# Patient Record
Sex: MALE | Race: WHITE | Employment: FULL TIME | URBAN - METROPOLITAN AREA
[De-identification: names, ages, dates, MRNs, and addresses within clinical notes are randomized per-mention and may not be internally consistent; named-entity substitution may affect disease eponyms.]

---

## 2017-05-26 PROCEDURE — 87205 SMEAR GRAM STAIN: CPT

## 2017-05-26 PROCEDURE — 87070 CULTURE OTHR SPECIMN AEROBIC: CPT

## 2017-05-27 ENCOUNTER — HOSPITAL ENCOUNTER (OUTPATIENT)
Age: 57
Setting detail: SPECIMEN
Discharge: HOME | End: 2017-05-27
Payer: COMMERCIAL

## 2017-05-27 LAB
DIRECT EXAM: NORMAL
DIRECT EXAM: NORMAL
Lab: NORMAL
SPECIMEN DESCRIPTION: NORMAL
SPECIMEN DESCRIPTION: NORMAL
STATUS: NORMAL

## 2017-05-27 PROCEDURE — 87070 CULTURE OTHR SPECIMN AEROBIC: CPT

## 2017-05-27 PROCEDURE — 87205 SMEAR GRAM STAIN: CPT

## 2017-05-29 LAB
CULTURE: ABNORMAL
CULTURE: ABNORMAL
DIRECT EXAM: ABNORMAL
DIRECT EXAM: ABNORMAL
Lab: ABNORMAL
SPECIMEN DESCRIPTION: ABNORMAL
STATUS: ABNORMAL

## 2017-10-03 ENCOUNTER — HOSPITAL ENCOUNTER (INPATIENT)
Age: 57
LOS: 3 days | Discharge: HOME | DRG: 603 | End: 2017-10-06
Attending: EMERGENCY MEDICINE | Admitting: INTERNAL MEDICINE
Payer: COMMERCIAL

## 2017-10-03 DIAGNOSIS — L03.115 CELLULITIS OF RIGHT LOWER EXTREMITY: Primary | ICD-10-CM

## 2017-10-03 LAB
ABSOLUTE EOS #: 0.1 K/UL (ref 0–0.4)
ABSOLUTE LYMPH #: 1.4 K/UL (ref 1–4.8)
ABSOLUTE MONO #: 1.4 K/UL (ref 0.1–1.2)
ANION GAP SERPL CALCULATED.3IONS-SCNC: 16 MMOL/L (ref 9–17)
BASOPHILS # BLD: 0 %
BASOPHILS ABSOLUTE: 0 K/UL (ref 0–0.2)
BUN BLDV-MCNC: 38 MG/DL (ref 6–20)
BUN/CREAT BLD: ABNORMAL (ref 9–20)
CALCIUM SERPL-MCNC: 9.4 MG/DL (ref 8.6–10.4)
CHLORIDE BLD-SCNC: 96 MMOL/L (ref 98–107)
CO2: 21 MMOL/L (ref 20–31)
CREAT SERPL-MCNC: 2.06 MG/DL (ref 0.7–1.2)
DIFFERENTIAL TYPE: ABNORMAL
EOSINOPHILS RELATIVE PERCENT: 1 %
GFR AFRICAN AMERICAN: 40 ML/MIN
GFR NON-AFRICAN AMERICAN: 33 ML/MIN
GFR SERPL CREATININE-BSD FRML MDRD: ABNORMAL ML/MIN/{1.73_M2}
GFR SERPL CREATININE-BSD FRML MDRD: ABNORMAL ML/MIN/{1.73_M2}
GLUCOSE BLD-MCNC: 194 MG/DL (ref 70–99)
HCT VFR BLD CALC: 41.4 % (ref 41–53)
HEMOGLOBIN: 13.8 G/DL (ref 13.5–17.5)
LACTIC ACID, WHOLE BLOOD: 1.7 MMOL/L (ref 0.7–2.1)
LACTIC ACID: NORMAL MMOL/L
LYMPHOCYTES # BLD: 9 %
MCH RBC QN AUTO: 29.4 PG (ref 26–34)
MCHC RBC AUTO-ENTMCNC: 33.2 G/DL (ref 31–37)
MCV RBC AUTO: 88.6 FL (ref 80–100)
MONOCYTES # BLD: 9 %
PDW BLD-RTO: 14.1 % (ref 12.5–15.4)
PLATELET # BLD: 198 K/UL (ref 140–450)
PLATELET ESTIMATE: ABNORMAL
PMV BLD AUTO: 8.9 FL (ref 6–12)
POTASSIUM SERPL-SCNC: 4.6 MMOL/L (ref 3.7–5.3)
RBC # BLD: 4.68 M/UL (ref 4.5–5.9)
RBC # BLD: ABNORMAL 10*6/UL
SEG NEUTROPHILS: 81 %
SEGMENTED NEUTROPHILS ABSOLUTE COUNT: 12 K/UL (ref 1.8–7.7)
SODIUM BLD-SCNC: 133 MMOL/L (ref 135–144)
WBC # BLD: 15 K/UL (ref 3.5–11)
WBC # BLD: ABNORMAL 10*3/UL

## 2017-10-03 PROCEDURE — 6360000002 HC RX W HCPCS: Performed by: EMERGENCY MEDICINE

## 2017-10-03 PROCEDURE — 96375 TX/PRO/DX INJ NEW DRUG ADDON: CPT

## 2017-10-03 PROCEDURE — 2580000003 HC RX 258: Performed by: PHYSICIAN ASSISTANT

## 2017-10-03 PROCEDURE — 85025 COMPLETE CBC W/AUTO DIFF WBC: CPT

## 2017-10-03 PROCEDURE — 6370000000 HC RX 637 (ALT 250 FOR IP): Performed by: NURSE PRACTITIONER

## 2017-10-03 PROCEDURE — 99284 EMERGENCY DEPT VISIT MOD MDM: CPT

## 2017-10-03 PROCEDURE — 1200000000 HC SEMI PRIVATE

## 2017-10-03 PROCEDURE — 96374 THER/PROPH/DIAG INJ IV PUSH: CPT

## 2017-10-03 PROCEDURE — 96376 TX/PRO/DX INJ SAME DRUG ADON: CPT

## 2017-10-03 PROCEDURE — 80048 BASIC METABOLIC PNL TOTAL CA: CPT

## 2017-10-03 PROCEDURE — 6360000002 HC RX W HCPCS: Performed by: PHYSICIAN ASSISTANT

## 2017-10-03 PROCEDURE — G0378 HOSPITAL OBSERVATION PER HR: HCPCS

## 2017-10-03 PROCEDURE — 93970 EXTREMITY STUDY: CPT

## 2017-10-03 PROCEDURE — 83605 ASSAY OF LACTIC ACID: CPT

## 2017-10-03 RX ORDER — FENTANYL CITRATE 50 UG/ML
50 INJECTION, SOLUTION INTRAMUSCULAR; INTRAVENOUS ONCE
Status: COMPLETED | OUTPATIENT
Start: 2017-10-03 | End: 2017-10-03

## 2017-10-03 RX ORDER — SODIUM CHLORIDE 0.9 % (FLUSH) 0.9 %
10 SYRINGE (ML) INJECTION PRN
Status: DISCONTINUED | OUTPATIENT
Start: 2017-10-03 | End: 2017-10-06 | Stop reason: HOSPADM

## 2017-10-03 RX ORDER — ONDANSETRON 2 MG/ML
4 INJECTION INTRAMUSCULAR; INTRAVENOUS EVERY 8 HOURS PRN
Status: DISCONTINUED | OUTPATIENT
Start: 2017-10-03 | End: 2017-10-06 | Stop reason: HOSPADM

## 2017-10-03 RX ORDER — FENTANYL CITRATE 50 UG/ML
50 INJECTION, SOLUTION INTRAMUSCULAR; INTRAVENOUS
Status: DISCONTINUED | OUTPATIENT
Start: 2017-10-03 | End: 2017-10-06 | Stop reason: HOSPADM

## 2017-10-03 RX ORDER — METOPROLOL TARTRATE 50 MG/1
50 TABLET, FILM COATED ORAL 2 TIMES DAILY
Status: DISCONTINUED | OUTPATIENT
Start: 2017-10-03 | End: 2017-10-06 | Stop reason: HOSPADM

## 2017-10-03 RX ORDER — ACETAMINOPHEN 325 MG/1
650 TABLET ORAL EVERY 4 HOURS PRN
Status: DISCONTINUED | OUTPATIENT
Start: 2017-10-03 | End: 2017-10-04

## 2017-10-03 RX ORDER — SODIUM CHLORIDE 0.9 % (FLUSH) 0.9 %
10 SYRINGE (ML) INJECTION EVERY 12 HOURS SCHEDULED
Status: DISCONTINUED | OUTPATIENT
Start: 2017-10-03 | End: 2017-10-06 | Stop reason: HOSPADM

## 2017-10-03 RX ORDER — ZOLPIDEM TARTRATE 5 MG/1
10 TABLET ORAL NIGHTLY PRN
Status: DISCONTINUED | OUTPATIENT
Start: 2017-10-03 | End: 2017-10-06 | Stop reason: HOSPADM

## 2017-10-03 RX ORDER — 0.9 % SODIUM CHLORIDE 0.9 %
1000 INTRAVENOUS SOLUTION INTRAVENOUS ONCE
Status: COMPLETED | OUTPATIENT
Start: 2017-10-03 | End: 2017-10-03

## 2017-10-03 RX ADMIN — METOPROLOL TARTRATE 50 MG: 50 TABLET, FILM COATED ORAL at 23:50

## 2017-10-03 RX ADMIN — FENTANYL CITRATE 50 MCG: 50 INJECTION INTRAMUSCULAR; INTRAVENOUS at 17:56

## 2017-10-03 RX ADMIN — VANCOMYCIN HYDROCHLORIDE 2000 MG: 1 INJECTION, POWDER, LYOPHILIZED, FOR SOLUTION INTRAVENOUS at 21:20

## 2017-10-03 RX ADMIN — FENTANYL CITRATE 50 MCG: 50 INJECTION INTRAMUSCULAR; INTRAVENOUS at 21:19

## 2017-10-03 RX ADMIN — ZOLPIDEM TARTRATE 10 MG: 5 TABLET ORAL at 23:50

## 2017-10-03 RX ADMIN — SODIUM CHLORIDE 1000 ML: 9 INJECTION, SOLUTION INTRAVENOUS at 19:20

## 2017-10-03 ASSESSMENT — PAIN DESCRIPTION - ORIENTATION
ORIENTATION: RIGHT;LOWER
ORIENTATION: RIGHT;LOWER

## 2017-10-03 ASSESSMENT — ENCOUNTER SYMPTOMS
COUGH: 0
COLOR CHANGE: 0
VOMITING: 0
NAUSEA: 0
DIARRHEA: 0
BACK PAIN: 0
SHORTNESS OF BREATH: 0
ABDOMINAL PAIN: 0

## 2017-10-03 ASSESSMENT — PAIN SCALES - GENERAL
PAINLEVEL_OUTOF10: 8
PAINLEVEL_OUTOF10: 8
PAINLEVEL_OUTOF10: 7

## 2017-10-03 ASSESSMENT — PAIN DESCRIPTION - PAIN TYPE
TYPE: ACUTE PAIN
TYPE: ACUTE PAIN

## 2017-10-03 ASSESSMENT — PAIN DESCRIPTION - PROGRESSION: CLINICAL_PROGRESSION: GRADUALLY WORSENING

## 2017-10-03 ASSESSMENT — PAIN DESCRIPTION - DESCRIPTORS
DESCRIPTORS: ACHING
DESCRIPTORS: ACHING

## 2017-10-03 ASSESSMENT — PAIN DESCRIPTION - LOCATION
LOCATION: LEG
LOCATION: LEG

## 2017-10-03 ASSESSMENT — PAIN DESCRIPTION - ONSET: ONSET: PROGRESSIVE

## 2017-10-03 NOTE — IP AVS SNAPSHOT
After Visit Summary  (Discharge Instructions)    Medication List for Home    Based on the information you provided to us as well as any changes during this visit, the following is your updated medication list.  Compare this with your prescription bottles at home. If you have any questions or concerns, contact your primary care physician's office. Daily Medication List (This medication list can be shared with any healthcare provider who is helping you manage your medications)      There are NEW medications for you. START taking them after you leave the hospital        Last Dose    Next Dose Due AM NOON PM NIGHT    cephALEXin 500 MG capsule   Commonly known as:  KEFLEX   Take 1 capsule by mouth 4 times daily for 7 days                500 mg on 10/6/2017 12:20 PM     tonight                                terbinafine 1 % cream   Commonly known as:  ATHLETES FOOT   Apply topically 2 times daily.  Ankles to toes, thin layer and let dry, both feet                  tonight                               These are medications you told us you were taking at home, CONTINUE taking them after you leave the hospital        Last Dose    Next Dose Due AM NOON PM NIGHT    ALLEGRA ALLERGY 180 MG tablet   Generic drug:  fexofenadine   Take 180 mg by mouth daily as needed                                         allopurinol 100 MG tablet   Commonly known as:  ZYLOPRIM   Take 100 mg by mouth daily                100 mg on 10/6/2017  8:44 AM     tomorrow                          aspirin 325 MG EC tablet   Take 325 mg by mouth daily                325 mg on 10/6/2017  8:44 AM     tomorrow                          cyclobenzaprine 5 MG tablet   Commonly known as:  FLEXERIL   Take 10 mg by mouth 2 times daily as needed for Muscle spasms                                               furosemide 40 MG tablet   Commonly known as:  LASIX   Take 40 mg by mouth daily                40 mg on 10/5/2017  9:10 AM     tomorrow hydrALAZINE 25 MG tablet   Commonly known as:  APRESOLINE   Take 25 mg by mouth 2 times daily                25 mg on 10/6/2017  8:44 AM     tonight                             ibuprofen 800 MG tablet   Commonly known as:  ADVIL;MOTRIN   Take 800 mg by mouth every 6 hours as needed for Pain                                         lisinopril 20 MG tablet   Commonly known as:  PRINIVIL;ZESTRIL   Take 20 mg by mouth daily                20 mg on 10/5/2017  9:11 AM     tomorrow                          metoprolol tartrate 50 MG tablet   Commonly known as:  LOPRESSOR   Take 50 mg by mouth 2 times daily                50 mg on 10/6/2017  8:44 AM     tonight                             pravastatin 20 MG tablet   Commonly known as:  PRAVACHOL   Take 20 mg by mouth daily                20 mg on 10/5/2017  5:33 PM     tomorrow                          SITagliptin 50 MG tablet   Commonly known as:  JANUVIA   Take 50 mg by mouth daily                  tomorrow                          traMADol 50 MG tablet   Commonly known as:  ULTRAM   Take 100 mg by mouth 4 times daily as needed for Pain Indications: Takes 2 tabs (=100mg) QID prn                100 mg on 10/6/2017 12:20 PM     620 pm                       zolpidem 10 MG tablet   Commonly known as:  AMBIEN   Take by mouth nightly as needed for Sleep                10 mg on 10/6/2017 12:04 AM                              These are the medications you have told us you were taking at home, STOP taking them after you leave the hospital     metFORMIN 500 MG tablet   Commonly known as:  GLUCOPHAGE            Where to Get Your Medications      You can get these medications from any pharmacy     Bring a paper prescription for each of these medications     cephALEXin 500 MG capsule    terbinafine 1 % cream               Allergies as of 10/6/2017        Reactions    Ampicillin Swelling    Swelling of throat. Pcn [Penicillins] Swelling    Throat swelling. Immunizations as of 10/6/2017     Name Date Dose VIS Date Route    Pneumococcal Polysaccharide (Ecupkddbo92) 2016 0.5 mL 2015 Intramuscular      Last Vitals          Most Recent Value    Temp  97.6 °F (36.4 °C)    Pulse  78    Resp  16    BP  110/76         After Visit Summary    This summary was created for you. Thank you for entrusting your care to us. The following information includes details about your hospital/visit stay along with steps you should take to help with your recovery once you leave the hospital.  In this packet, you will find information about the topics listed below:    · Instructions about your medications including a list of your home medications  · A summary of your hospital visit  · Follow-up appointments once you have left the hospital  · Your care plan at home      You may receive a survey regarding the care you received during your stay. Your input is valuable to us. We encourage you to complete and return your survey in the envelope provided. We hope you will choose us in the future for your healthcare needs. Patient Information     Patient Name BRAD Mckeon 1960      Care Provided at:     Name Address Phone       [address] [address] [phone]            Your Visit    Here you will find information about your visit, including the reason for your visit. Please take this sheet with you when you visit your doctor or other health care provider in the future. It will help determine the best possible medical care for you at that time. If you have any questions once you leave the hospital, please call the department phone number listed below.         Why you were here     Your primary diagnosis was:  Not on File    Your diagnoses also included:  Cellulitis, Type 2 Diabetes Mellitus Without Complication (Hcc), Essential Hypertension, Chronic Kidney Disease (Ckd), Laz (Acute Kidney Injury) ([facility]) Visit Information     Date & Time Provider Department Dept. Phone    10/3/2017 Christoper Scheuermann, MD [facility] Ortho/Med Surg [phone]       Follow-up Appointments    Below is a list of your follow-up and future appointments. This may not be a complete list as you may have made appointments directly with providers that we are not aware of or your providers may have made some for you. Please call your providers to confirm appointments. It is important to keep your appointments. Please bring your current insurance card, photo ID, co-pay, and all medication bottles to your appointment. If self-pay, payment is expected at the time of service. Follow-up Information     Follow up with Viki Simon MD In 1 week. Specialty:  Family Medicine    Contact information:    [address] [facility]        Preventive Care        Date Due    Hepatitis C screening is recommended for all adults regardless of risk factors born between [facility] at least once (lifetime) who have never been tested. 1960    Diabetic Foot Exam 1/13/1968    Eye Exam By An Eye Doctor 1/13/1968    Cholesterol Screening 1/13/1968    HIV screening is recommended for all people regardless of risk factors  aged 15-65 years at least once (lifetime) who have never been HIV tested. 1/13/1973    Tetanus Combination Vaccine (1 - Tdap) 1/13/1977    Colonoscopy 1/13/2008    Yearly Flu Vaccine (1) 9/1/2017    Urine Check For Kidney Problems 12/11/2017    Hemoglobin A1C (Test For Long-Term Glucose Control) 1/5/2018                 Care Plan Once You Return Home    This section includes instructions you will need to follow once you leave the hospital.  Your care team will discuss these with you, so you and those caring for you know how to best care for your health needs at home. This section may also include educational information about certain health topics that may be of help to you. results, renew your prescriptions, schedule appointments, view visit notes, and more. How Do I Sign Up? 1. In your Internet browser, go to [email or web address]  2. Click on the Sign Up Now link in the Sign In box. You will see the New Member Sign Up page. 3. Enter your [employer] Access Code exactly as it appears below. You will not need to use this code after youve completed the sign-up process. If you do not sign up before the expiration date, you must request a new code. [employer] Access Code: [email or web address]  Expires: 12/3/2017  9:09 AM    4. Enter your Social Security Number ([id number]) and Date of Birth (mm/dd/yyyy) as indicated and click Submit. You will be taken to the next sign-up page. 5. Create a [employer] ID. This will be your [employer] login ID and cannot be changed, so think of one that is secure and easy to remember. 6. Create a [employer] password. You can change your password at any time. 7. Enter your Password Reset Question and Answer. This can be used at a later time if you forget your password. 8. Enter your e-mail address. You will receive e-mail notification when new information is available in [address] 9. Click Sign Up. You can now view your medical record. Additional Information  If you have questions, please contact the physician practice where you receive care. Remember, [employer] is NOT to be used for urgent needs. For medical emergencies, dial 911. For questions regarding your [employer] account call [phone]. If you have a clinical question, please call your doctor's office. View your information online  ? Review your current list of  medications, immunization, and allergies. ? Review your future test results online . ?  Review your discharge instructions provided by your caregivers at discharge    Certain functionality such as prescription refills, scheduling appointments or sending messages to your provider are not activated if your doctor for medical advice about side effects. You may report side effects to FDA at 1-451-FDA-5090. What other drugs will affect cephalexin? Other drugs may interact with cephalexin, including prescription and over-the-counter medicines, vitamins, and herbal products. Tell each of your health care providers about all medicines you use now and any medicine you start or stop using. Where can I get more information? Your pharmacist can provide more information about cephalexin. Remember, keep this and all other medicines out of the reach of children, never share your medicines with others, and use this medication only for the indication prescribed. Every effort has been made to ensure that the information provided by [address] [place], [place] [address] is accurate, up-to-date, and complete, but no guarantee is made to that effect. Drug information contained herein may be time sensitive. [employer] information has been compiled for use by healthcare practitioners and consumers in the [place] and therefore [employer] does not warrant that uses outside of the [place] are appropriate, unless specifically indicated otherwise. [facility]'s drug information does not endorse drugs, diagnose patients or recommend therapy. [employer] drug information is an informational resource designed to assist licensed healthcare practitioners in caring for their patients and/or to serve consumers viewing this service as a supplement to, and not a substitute for, the expertise, skill, knowledge and judgment of healthcare practitioners. The absence of a warning for a given drug or drug combination in no way should be construed to indicate that the drug or drug combination is safe, effective or appropriate for any given patient. [facility] does not assume any responsibility for any aspect of healthcare administered with the aid of information [employer] [facility] provides.  The information contained herein is not intended to cover all possible uses, directions, precautions, warnings, drug interactions, allergic reactions, or adverse effects. If you have questions about the drugs you are taking, check with your doctor, nurse or pharmacist.  Copyright 0413-1638 [address] Version: 8.01. Revision date: 2/4/2016. Care instructions adapted under license by [facility] ([facility]). If you have questions about a medical condition or this instruction, always ask your healthcare professional. [place] [address] any warranty or liability for your use of this information. terbinafine topical  Pronunciation:  ter BIN a feen TOP i leti  Brand:  Athlete's Foot Cream, LamISIL AT  What is the most important information I should know about terbinafine topical?  Follow all directions on your medicine label and package. Tell each of your healthcare providers about all your medical conditions, allergies, and all medicines you use. What is terbinafine topical?  Terbinafine is an antifungal medication that fights infections caused by fungus. Terbinafine topical (for the skin) is used to treat skin infections such as athlete's foot, jock itch, and ringworm infections. Terbinafine topical may also be used for purposes not listed in this medication guide. What should I discuss with my healthcare provider before using terbinafine topical?  You should not use terbinafine topical if you are allergic to it. This medicine is not expected to harm an unborn baby. Tell your doctor if you are pregnant or plan to become pregnant. It is not known whether terbinafine passes into breast milk or if it could harm a nursing baby. Tell your doctor if you are breast-feeding a baby. Terbinafine is not approved for use by anyone younger than 15years old. How should I use terbinafine topical?  Use exactly as directed on the label, or as prescribed by your doctor. Do not use in larger or smaller amounts or for longer than recommended. Do not take by mouth. Terbinafine topical is for use only on the skin. Do not use this medicine to treat fungal infections of your fingernails, toenails, or scalp. Read all patient information, medication guides, and instruction sheets provided to you. Ask your doctor or pharmacist if you have any questions. Wash your hands before and after using this medicine. Clean and dry the affected area. Apply the medicine as directed. Use this medicine for the full prescribed length of time. Your symptoms may improve before the infection is completely cleared. Call your doctor if your symptoms do not improve, or if they get worse. Do not cover the treated skin area unless your doctor tells you to. Store at room temperature away from moisture and heat. Keep the medicine container tightly closed when not in use. What happens if I miss a dose? Apply the missed dose as soon as you remember. Skip the missed dose if it is almost time for your next dose. Do not use extra medicine to make up the missed dose. What happens if I overdose? An overdose of terbinafine is not expected to be dangerous. Seek emergency medical attention or call the Poison Help line at 1-897.560.8770 if anyone has accidentally swallowed the medication. What should I avoid while using terbinafine topical?  If this medicine gets in your eyes, nose, mouth, rectum, or vagina, rinse with water. Avoid using other medications on the areas you treat with terbinafine unless your doctor tells you to. Avoid wearing tight-fitting, synthetic clothing that doesn't allow air circulation. Wear loose-fitting clothing made of cotton and other natural fibers until your infection is healed. What are the possible side effects of terbinafine topical?  Get emergency medical help if you have signs of an allergic reaction:  hives; difficult breathing; swelling of your face, lips, tongue, or throat.   Stop using terbinafine and call your doctor at once if you have: drug combination in no way should be construed to indicate that the drug or drug combination is safe, effective or appropriate for any given patient. [facility] does not assume any responsibility for any aspect of healthcare administered with the aid of information [facility] provides. The information contained herein is not intended to cover all possible uses, directions, precautions, warnings, drug interactions, allergic reactions, or adverse effects. If you have questions about the drugs you are taking, check with your doctor, nurse or pharmacist.  Copyright 4049-3220 [address]: 7.01. Revision date: 2/20/2015. Care instructions adapted under license by [facility] ([facility]). If you have questions about a medical condition or this instruction, always ask your healthcare professional. [place] [address] any warranty or liability for your use of this information.

## 2017-10-03 NOTE — PROGRESS NOTES
Pharmacy Note  Vancomycin Consult    Guerrero Cortez is a 59 y.o. male started on Vancomycin for cellulitis; consult received from [facility], [place] to manage therapy. Patient Active Problem List   Diagnosis    Cellulitis    Type 2 diabetes mellitus without complication (HCC)       Allergies:  Ampicillin and Pcn [penicillins]     Temp max: 99.1    Recent Labs      10/03/17   1759   BUN  38*       Recent Labs      10/03/17   1759   CREATININE  2.06*       Recent Labs      10/03/17   1759   WBC  15.0*       No intake or output data in the 24 hours ending 10/03/17 1937    Culture Date      Source                       Results  None    Ht Readings from Last 1 Encounters:   10/03/17 5' 10.87\" (1.8 m)        Wt Readings from Last 1 Encounters:   10/03/17 292 lb (132.5 kg)         Body mass index is 40.88 kg/(m^2). Estimated Creatinine Clearance: 54 mL/min (based on Cr of 2.06). Assessment/Plan:  Will initiate vancomycin 2000 mg IV once. Will obtain random vancomycin level with AM labs tomorrow. Further dosing to be determined based on level, renal function, and clinical response. Thank you for the consult. Will continue to follow.   Maria M Valenzuela, PharmD, BCPS  10/3/2017  7:42 PM

## 2017-10-03 NOTE — IP AVS SNAPSHOT
Patient Information     Patient Name BRAD Villeda 1960         This is your updated medication list to keep with you all times      TAKE these medications     ALLEGRA ALLERGY 180 MG tablet   Generic drug:  fexofenadine       allopurinol 100 MG tablet   Commonly known as:  ZYLOPRIM       aspirin 325 MG EC tablet       cephALEXin 500 MG capsule   Commonly known as:  KEFLEX   Take 1 capsule by mouth 4 times daily for 7 days       cyclobenzaprine 5 MG tablet   Commonly known as:  FLEXERIL       furosemide 40 MG tablet   Commonly known as:  LASIX       hydrALAZINE 25 MG tablet   Commonly known as:  APRESOLINE       ibuprofen 800 MG tablet   Commonly known as:  ADVIL;MOTRIN       lisinopril 20 MG tablet   Commonly known as:  PRINIVIL;ZESTRIL       metoprolol tartrate 50 MG tablet   Commonly known as:  LOPRESSOR       pravastatin 20 MG tablet   Commonly known as:  PRAVACHOL       SITagliptin 50 MG tablet   Commonly known as:  JANUVIA       terbinafine 1 % cream   Commonly known as:  ATHLETES FOOT   Apply topically 2 times daily.  Ankles to toes, thin layer and let dry, both feet       traMADol 50 MG tablet   Commonly known as:  ULTRAM       zolpidem 10 MG tablet   Commonly known as:  AMBIEN

## 2017-10-03 NOTE — ED PROVIDER NOTES
[facility]  ED  eMERGENCY dEPARTMENT eNCOUnter      Pt Name: Girma Duron  MRN: [id number]  [place] 1960  Date of evaluation: 10/3/2017  Provider: Tavo Farley Dr       Chief Complaint   Patient presents with    Leg Pain     pt with cellulitis to lower right extremity. redness noted from below the knee to his foot. c/o pain. ongoing this week. worsening. pt is a diabetic. HISTORY OF PRESENT ILLNESS  (Location/Symptom, Timing/Onset, Context/Setting, Quality, Duration, Modifying Factors, Severity.)   Girma Duron is a 59 y.o. male who presents to the emergency department Complaining of right lower leg swelling and pain and redness. Patient states he has had cellulitis in this extremity before and states that it feels like that. Patient's right lower leg is swollen and his foot is red and swollen as well. Patient has no history of blood clotting and states that he has 0 calf pain. Patient states he has had this before and was admitted to the hospital for intravenous antibiotics. Patient denies chest pain, shortness of breath, abdominal pain, headache, fever, chills, nausea, vomiting, diarrhea, numbness and tingling anywhere in his body. Patient is diabetic. Patient states this started Sunday morning. He also states he is taking oral antibiotics for his nose cut her heel. Patient is resting comfortably on the gurney and appears in no acute distress. Patient appears nontoxic with no meningeal signs. Patient is neurovascular intact. Patient's light sensation is intact. Cap refills less than. Proprioception is within normal limits. Distal pulses and deep tendon reflexes are within normal limits. Motor functions 5 out of 5 bilaterally in the lower extremity. All compartments are soft and compressible. absolutely no other symptoms at this time.           REVIEW OF SYSTEMS    (2-9 systems for level 4, 10 or more for level 5)     Review of Systems Constitutional: Negative for chills, fatigue and fever. Respiratory: Negative for cough and shortness of breath. Cardiovascular: Negative for chest pain, palpitations and leg swelling. Gastrointestinal: Negative for abdominal pain, diarrhea, nausea and vomiting. Musculoskeletal: Negative for back pain, neck pain and neck stiffness. Skin: Negative for color change. Right lower extremity cellulitis. Neurological: Negative for dizziness, weakness, numbness and headaches.          PAST MEDICAL HISTORY         Diagnosis Date    Back pain     Cellulitis     Diabetes mellitus ([facility]     Gout     Hypertension        SURGICAL HISTORY           Procedure Laterality Date    ANKLE SURGERY      CARPAL TUNNEL RELEASE Bilateral     KNEE SURGERY Bilateral        CURRENT MEDICATIONS       Previous Medications    ALLOPURINOL (ZYLOPRIM) 100 MG TABLET    Take 100 mg by mouth daily    ASPIRIN 325 MG EC TABLET    Take 325 mg by mouth daily    CYCLOBENZAPRINE (FLEXERIL) 5 MG TABLET    Take 10 mg by mouth 2 times daily as needed for Muscle spasms     FEXOFENADINE (ALLEGRA ALLERGY) 180 MG TABLET    Take 180 mg by mouth daily as needed    FUROSEMIDE (LASIX) 40 MG TABLET    Take 40 mg by mouth daily     HYDRALAZINE (APRESOLINE) 25 MG TABLET    Take 25 mg by mouth 2 times daily     IBUPROFEN (ADVIL;MOTRIN) 800 MG TABLET    Take 800 mg by mouth every 6 hours as needed for Pain    LISINOPRIL (PRINIVIL;ZESTRIL) 20 MG TABLET    Take 20 mg by mouth daily    METOPROLOL (LOPRESSOR) 50 MG TABLET    Take 50 mg by mouth 2 times daily    PRAVASTATIN (PRAVACHOL) 20 MG TABLET    Take 20 mg by mouth daily    SITAGLIPTIN (JANUVIA) 50 MG TABLET    Take 50 mg by mouth daily    TRAMADOL (ULTRAM) 50 MG TABLET    Take 100 mg by mouth 4 times daily as needed for Pain Indications: Takes 2 tabs (=100mg) QID prn    ZOLPIDEM (AMBIEN) 10 MG TABLET    Take by mouth nightly as needed for Sleep       ALLERGIES     Ampicillin and Pcn [penicillins]  Reviewed  FAMILY HISTORY           Problem Relation Age of Onset    Stroke Maternal Grandmother     Stroke Maternal Grandfather      Family Status   Relation Status    Maternal Grandmother     Maternal Grandfather         SOCIAL HISTORY      reports that he has never smoked. He does not have any smokeless tobacco history on file. He reports that he drinks alcohol. He reports that he does not use illicit drugs. PHYSICAL EXAM    (up to 7 for level 4, 8 or more for level 5)     Vitals:    10/03/17 1723 10/03/17 1929   BP: 122/86    Pulse: 89    Resp: 16    Temp: 99.1 °F (37.3 °C)    TempSrc: Oral    SpO2: 99%    Weight: 292 lb (132.5 kg)    Height:  5' 10.87\" (1.8 m)       Physical Exam   Constitutional: He is oriented to person, place, and time. He appears well-developed and well-nourished. No distress. HENT:   Head: Normocephalic and atraumatic. Eyes: Conjunctivae are normal. Pupils are equal, round, and reactive to light. Neck: Normal range of motion. Neck supple. Cardiovascular: Normal rate, regular rhythm, normal heart sounds and intact distal pulses. Exam reveals no gallop and no friction rub. No murmur heard. Pulmonary/Chest: Effort normal and breath sounds normal. No respiratory distress. He has no wheezes. He has no rales. Abdominal: Soft. Bowel sounds are normal. He exhibits no distension and no mass. There is no tenderness. There is no rebound and no guarding. Musculoskeletal: Normal range of motion. Legs:  Neurological: He is alert and oriented to person, place, and time. He has normal reflexes. Skin: Skin is dry. He is not diaphoretic. Psychiatric: He has a normal mood and affect.  His behavior is normal. Judgment and thought content normal.         DIAGNOSTIC RESULTS       RADIOLOGY:   Non-plain film images such as CT, Ultrasound and MRI are read by the radiologist. Plain radiographic images are visualized and preliminarily interpreted by Juliane Jeter PA-C with the below findings:    See below. Negative for DVT. Interpretation per the Radiologist below, if available at the time of this note:    VL DUP LOWER EXTREMITY VENOUS BILATERAL    (Results Pending)         LABS:  Labs Reviewed   CBC WITH AUTO DIFFERENTIAL - Abnormal; Notable for the following:        Result Value    WBC 15.0 (*)     Segs Absolute 12.00 (*)     Absolute Mono # 1.40 (*)     All other components within normal limits   BASIC METABOLIC PANEL - Abnormal; Notable for the following:     Glucose 194 (*)     BUN 38 (*)     CREATININE 2.06 (*)     Sodium 133 (*)     Chloride 96 (*)     GFR Non- 33 (*)     GFR  40 (*)     All other components within normal limits   LACTIC ACID, PLASMA   BUN & CREATININE   VANCOMYCIN, RANDOM           EMERGENCY DEPARTMENT COURSE and DIFFERENTIAL DIAGNOSIS/MDM:   Vitals:    Vitals:    10/03/17 1723 10/03/17 1929   BP: 122/86    Pulse: 89    Resp: 16    Temp: 99.1 °F (37.3 °C)    TempSrc: Oral    SpO2: 99%    Weight: 292 lb (132.5 kg)    Height:  5' 10.87\" (1.8 m)       Cellulitis. CBC, BMP, venous Doppler studies, vancomycin, fentanyl. Patient will be admitted to the hospital at this time. 8:02 PM-Macey from Dr. Lilyan Cockayne office was spoken with and she stated that they will accept the admission and she will put in bridging orders and get the patient admitted. This patient was seen by the attending physician and they agreed with the assessment and plan. CONSULTS:  PHARMACY TO DOSE VANCOMYCIN  IP CONSULT TO PRIMARY CARE PROVIDER    PROCEDURES:  Procedures    FINAL IMPRESSION      1. Cellulitis of right lower extremity          DISPOSITION/PLAN   DISPOSITION     PATIENT REFERRED TO:  No follow-up provider specified.     DISCHARGE MEDICATIONS:  New Prescriptions    No medications on file       (Please note that portions of this note were completed with a voice recognition program.  Efforts were made to edit the dictations but occasionally words are mis-transcribed.)    Steve Bermeo [id number], ANANT Oden PA-C  10/03/17 2003

## 2017-10-03 NOTE — ED PROVIDER NOTES
[facility] ED     Emergency Department     Faculty Attestation    I performed a history and physical examination of the patient and discussed management with the resident. I reviewed the residents note and agree with the documented findings and plan of care. Any areas of disagreement are noted on the chart. I was personally present for the key portions of any procedures. I have documented in the chart those procedures where I was not present during the key portions. I have reviewed the emergency nurses triage note. I agree with the chief complaint, past medical history, past surgical history, allergies, medications, social and family history as documented unless otherwise noted below. For Physician Assistant/ Nurse Practitioner cases/documentation I have personally evaluated this patient and have completed at least one if not all key elements of the E/M (history, physical exam, and MDM). Additional findings are as noted. Right lower extremity edema, prior cellulitis, requiring admission with IV antibiotics. Has never had swelling this bad. No history of DVT, no risk factors. Is a diabetic, has not been checking her sugars. No fevers just does not feel well. On exam marked asymmetry of the rightlower extremity versus the left. edema 4+ pitting with posterior calf pain. Distally pink and warm well perfused.   Will order DVT study, labs, lactate, probable admission      Critical Care     None    Stephen Paniagua MD, Mireille Ott  Attending Emergency  Physician             Stephen Paniagua MD  10/03/17 1219

## 2017-10-04 PROBLEM — E66.01 MORBID OBESITY DUE TO EXCESS CALORIES (HCC): Status: ACTIVE | Noted: 2017-10-04

## 2017-10-04 PROBLEM — N18.9 CHRONIC KIDNEY DISEASE (CKD): Status: ACTIVE | Noted: 2017-10-04

## 2017-10-04 PROBLEM — I10 ESSENTIAL HYPERTENSION: Status: ACTIVE | Noted: 2017-10-04

## 2017-10-04 LAB
GLUCOSE BLD-MCNC: 164 MG/DL (ref 75–110)
GLUCOSE BLD-MCNC: 171 MG/DL (ref 75–110)
GLUCOSE BLD-MCNC: 229 MG/DL (ref 75–110)
GLUCOSE BLD-MCNC: 240 MG/DL (ref 75–110)

## 2017-10-04 PROCEDURE — 1200000000 HC SEMI PRIVATE

## 2017-10-04 PROCEDURE — 82947 ASSAY GLUCOSE BLOOD QUANT: CPT

## 2017-10-04 PROCEDURE — 6370000000 HC RX 637 (ALT 250 FOR IP): Performed by: NURSE PRACTITIONER

## 2017-10-04 PROCEDURE — G0378 HOSPITAL OBSERVATION PER HR: HCPCS

## 2017-10-04 PROCEDURE — 96366 THER/PROPH/DIAG IV INF ADDON: CPT

## 2017-10-04 PROCEDURE — 2580000003 HC RX 258: Performed by: PHYSICIAN ASSISTANT

## 2017-10-04 PROCEDURE — 96365 THER/PROPH/DIAG IV INF INIT: CPT

## 2017-10-04 PROCEDURE — 6360000002 HC RX W HCPCS: Performed by: PHYSICIAN ASSISTANT

## 2017-10-04 RX ORDER — ALLOPURINOL 100 MG/1
100 TABLET ORAL DAILY
Status: DISCONTINUED | OUTPATIENT
Start: 2017-10-04 | End: 2017-10-06 | Stop reason: HOSPADM

## 2017-10-04 RX ORDER — DEXTROSE MONOHYDRATE 25 G/50ML
12.5 INJECTION, SOLUTION INTRAVENOUS PRN
Status: DISCONTINUED | OUTPATIENT
Start: 2017-10-04 | End: 2017-10-06 | Stop reason: HOSPADM

## 2017-10-04 RX ORDER — CETIRIZINE HYDROCHLORIDE 10 MG/1
10 TABLET ORAL DAILY
Status: DISCONTINUED | OUTPATIENT
Start: 2017-10-04 | End: 2017-10-06 | Stop reason: HOSPADM

## 2017-10-04 RX ORDER — TRAMADOL HYDROCHLORIDE 50 MG/1
100 TABLET ORAL 4 TIMES DAILY PRN
Status: DISCONTINUED | OUTPATIENT
Start: 2017-10-04 | End: 2017-10-06 | Stop reason: HOSPADM

## 2017-10-04 RX ORDER — DEXTROSE MONOHYDRATE 50 MG/ML
100 INJECTION, SOLUTION INTRAVENOUS PRN
Status: DISCONTINUED | OUTPATIENT
Start: 2017-10-04 | End: 2017-10-06 | Stop reason: HOSPADM

## 2017-10-04 RX ORDER — METOPROLOL TARTRATE 50 MG/1
50 TABLET, FILM COATED ORAL 2 TIMES DAILY
Status: DISCONTINUED | OUTPATIENT
Start: 2017-10-04 | End: 2017-10-04 | Stop reason: SDUPTHER

## 2017-10-04 RX ORDER — NICOTINE POLACRILEX 4 MG
15 LOZENGE BUCCAL PRN
Status: DISCONTINUED | OUTPATIENT
Start: 2017-10-04 | End: 2017-10-06 | Stop reason: HOSPADM

## 2017-10-04 RX ORDER — PRAVASTATIN SODIUM 20 MG
20 TABLET ORAL
Status: DISCONTINUED | OUTPATIENT
Start: 2017-10-04 | End: 2017-10-06 | Stop reason: HOSPADM

## 2017-10-04 RX ORDER — ACETAMINOPHEN 325 MG/1
650 TABLET ORAL EVERY 6 HOURS SCHEDULED
Status: DISCONTINUED | OUTPATIENT
Start: 2017-10-04 | End: 2017-10-06 | Stop reason: HOSPADM

## 2017-10-04 RX ORDER — CYCLOBENZAPRINE HCL 10 MG
10 TABLET ORAL 2 TIMES DAILY PRN
Status: DISCONTINUED | OUTPATIENT
Start: 2017-10-04 | End: 2017-10-06 | Stop reason: HOSPADM

## 2017-10-04 RX ORDER — HYDRALAZINE HYDROCHLORIDE 25 MG/1
25 TABLET, FILM COATED ORAL 2 TIMES DAILY
Status: DISCONTINUED | OUTPATIENT
Start: 2017-10-04 | End: 2017-10-06 | Stop reason: HOSPADM

## 2017-10-04 RX ORDER — LISINOPRIL 20 MG/1
20 TABLET ORAL DAILY
Status: DISCONTINUED | OUTPATIENT
Start: 2017-10-04 | End: 2017-10-05

## 2017-10-04 RX ORDER — ZOLPIDEM TARTRATE 5 MG/1
5 TABLET ORAL NIGHTLY PRN
Status: DISCONTINUED | OUTPATIENT
Start: 2017-10-04 | End: 2017-10-04 | Stop reason: SDUPTHER

## 2017-10-04 RX ORDER — FUROSEMIDE 40 MG/1
40 TABLET ORAL DAILY
Status: DISCONTINUED | OUTPATIENT
Start: 2017-10-04 | End: 2017-10-05

## 2017-10-04 RX ADMIN — TRAMADOL HYDROCHLORIDE 100 MG: 50 TABLET, FILM COATED ORAL at 11:54

## 2017-10-04 RX ADMIN — HYDRALAZINE HYDROCHLORIDE 25 MG: 25 TABLET, FILM COATED ORAL at 22:03

## 2017-10-04 RX ADMIN — METOPROLOL TARTRATE 50 MG: 50 TABLET, FILM COATED ORAL at 12:48

## 2017-10-04 RX ADMIN — METOPROLOL TARTRATE 50 MG: 50 TABLET, FILM COATED ORAL at 22:03

## 2017-10-04 RX ADMIN — VANCOMYCIN HYDROCHLORIDE 1250 MG: 10 INJECTION, POWDER, LYOPHILIZED, FOR SOLUTION INTRAVENOUS at 11:04

## 2017-10-04 RX ADMIN — ALLOPURINOL 100 MG: 100 TABLET ORAL at 12:48

## 2017-10-04 RX ADMIN — HYDRALAZINE HYDROCHLORIDE 25 MG: 25 TABLET, FILM COATED ORAL at 12:48

## 2017-10-04 RX ADMIN — ASPIRIN 325 MG: 325 TABLET, COATED ORAL at 12:48

## 2017-10-04 RX ADMIN — CETIRIZINE HYDROCHLORIDE 10 MG: 10 TABLET ORAL at 12:48

## 2017-10-04 RX ADMIN — TRAMADOL HYDROCHLORIDE 100 MG: 50 TABLET, FILM COATED ORAL at 18:04

## 2017-10-04 RX ADMIN — LISINOPRIL 20 MG: 20 TABLET ORAL at 12:48

## 2017-10-04 RX ADMIN — LINAGLIPTIN 5 MG: 5 TABLET, FILM COATED ORAL at 18:04

## 2017-10-04 RX ADMIN — PRAVASTATIN SODIUM 20 MG: 20 TABLET ORAL at 18:04

## 2017-10-04 RX ADMIN — VANCOMYCIN HYDROCHLORIDE 1250 MG: 10 INJECTION, POWDER, LYOPHILIZED, FOR SOLUTION INTRAVENOUS at 22:11

## 2017-10-04 RX ADMIN — FUROSEMIDE 40 MG: 40 TABLET ORAL at 12:48

## 2017-10-04 RX ADMIN — INSULIN LISPRO 2 UNITS: 100 INJECTION, SOLUTION INTRAVENOUS; SUBCUTANEOUS at 11:54

## 2017-10-04 RX ADMIN — ACETAMINOPHEN 650 MG: 325 TABLET ORAL at 18:04

## 2017-10-04 RX ADMIN — INSULIN LISPRO 1 UNITS: 100 INJECTION, SOLUTION INTRAVENOUS; SUBCUTANEOUS at 22:10

## 2017-10-04 ASSESSMENT — PAIN DESCRIPTION - PAIN TYPE
TYPE: ACUTE PAIN
TYPE: ACUTE PAIN

## 2017-10-04 ASSESSMENT — PAIN DESCRIPTION - ONSET: ONSET: ON-GOING

## 2017-10-04 ASSESSMENT — PAIN DESCRIPTION - PROGRESSION: CLINICAL_PROGRESSION: NOT CHANGED

## 2017-10-04 ASSESSMENT — PAIN DESCRIPTION - DESCRIPTORS: DESCRIPTORS: ACHING

## 2017-10-04 ASSESSMENT — PAIN SCALES - GENERAL
PAINLEVEL_OUTOF10: 6
PAINLEVEL_OUTOF10: 6
PAINLEVEL_OUTOF10: 7
PAINLEVEL_OUTOF10: 7
PAINLEVEL_OUTOF10: 5
PAINLEVEL_OUTOF10: 7

## 2017-10-04 ASSESSMENT — PAIN DESCRIPTION - FREQUENCY: FREQUENCY: CONTINUOUS

## 2017-10-04 ASSESSMENT — PAIN DESCRIPTION - LOCATION
LOCATION: LEG
LOCATION: LEG

## 2017-10-04 ASSESSMENT — PAIN DESCRIPTION - ORIENTATION
ORIENTATION: RIGHT
ORIENTATION: RIGHT

## 2017-10-04 NOTE — PROGRESS NOTES
Spoke with Stephanie Dias NP with Dr. Nannette Boyd, re: patient's request for pain medication. Writer advised that she Delbert Coronado) will review the patient's medications and make a decision @ that time.

## 2017-10-04 NOTE — H&P
(PRAVACHOL) 20 MG tablet, Take 20 mg by mouth daily  metoprolol (LOPRESSOR) 50 MG tablet, Take 50 mg by mouth 2 times daily  lisinopril (PRINIVIL;ZESTRIL) 20 MG tablet, Take 20 mg by mouth daily  allopurinol (ZYLOPRIM) 100 MG tablet, Take 100 mg by mouth daily  zolpidem (AMBIEN) 10 MG tablet, Take by mouth nightly as needed for Sleep  cyclobenzaprine (FLEXERIL) 5 MG tablet, Take 10 mg by mouth 2 times daily as needed for Muscle spasms   furosemide (LASIX) 40 MG tablet, Take 40 mg by mouth daily   aspirin 325 MG EC tablet, Take 325 mg by mouth daily  ibuprofen (ADVIL;MOTRIN) 800 MG tablet, Take 800 mg by mouth every 6 hours as needed for Pain  hydrALAZINE (APRESOLINE) 25 MG tablet, Take 25 mg by mouth 2 times daily     Allergies:  Ampicillin and Pcn [penicillins]    Social History:   TOBACCO:  Never used tobacco  ETOH:   reports that he drinks alcohol. DRUGS:   reports that he does not use illicit drugs.   ACTIVITIES OF DAILY LIVING:    MARITAL STATUS:    Travel History:  Pt travels occasionally and was on his boat this last weekend    Family History:       Problem Relation Age of Onset    Stroke Maternal Grandmother     Stroke Maternal Grandfather      REVIEW OF SYSTEMS:  CONSTITUTIONAL:  positive for  fevers, chills, fatigue, malaise and anorexia  EYES:  negative  HEENT:  negative  RESPIRATORY:  negative for  dry cough, cough with sputum, dyspnea, wheezing and chest pain  CARDIOVASCULAR:  negative for  chest pain, dyspnea, palpitations, edema  GASTROINTESTINAL:  negative for nausea, vomiting, diarrhea, constipation and abdominal pain  GENITOURINARY:  negative  INTEGUMENT/BREAST:  positive for redness, pain, swelling in right lower leg  HEMATOLOGIC/LYMPHATIC:  negative for easy bruising and bleeding  MUSCULOSKELETAL:  negative for  joint swelling, stiff joints, decreased range of motion and muscle weakness  NEUROLOGICAL:  negative for headaches, dizziness and weakness  BEHAVIOR/PSYCH:  negative for possibly related to venous stasis, no pain in left leg.      DATA:  CBC with Differential:    Lab Results   Component Value Date    WBC 15.0 10/03/2017    RBC 4.68 10/03/2017    HGB 13.8 10/03/2017    HCT 41.4 10/03/2017     10/03/2017    MCV 88.6 10/03/2017    MCH 29.4 10/03/2017    MCHC 33.2 10/03/2017    RDW 14.1 10/03/2017    LYMPHOPCT 9 10/03/2017    MONOPCT 9 10/03/2017    BASOPCT 0 10/03/2017    MONOSABS 1.40 10/03/2017    LYMPHSABS 1.40 10/03/2017    EOSABS 0.10 10/03/2017    BASOSABS 0.00 10/03/2017    DIFFTYPE NOT REPORTED 10/03/2017     CMP:    Lab Results   Component Value Date     10/03/2017    K 4.6 10/03/2017    CL 96 10/03/2017    CO2 21 10/03/2017    BUN 38 10/03/2017    CREATININE 2.06 10/03/2017    GFRAA 40 10/03/2017    LABGLOM 33 10/03/2017    GLUCOSE 194 10/03/2017    LABALBU 3.4 12/10/2016    CALCIUM 9.4 10/03/2017     BMP:    Lab Results   Component Value Date     10/03/2017    K 4.6 10/03/2017    CL 96 10/03/2017    CO2 21 10/03/2017    BUN 38 10/03/2017    LABALBU 3.4 12/10/2016    CREATININE 2.06 10/03/2017    CALCIUM 9.4 10/03/2017    GFRAA 40 10/03/2017    LABGLOM 33 10/03/2017    GLUCOSE 194 10/03/2017     Hepatic Function Panel:    Lab Results   Component Value Date    LABALBU 3.4 12/10/2016     Magnesium:    Lab Results   Component Value Date    MG 1.9 12/14/2016     Phosphorus:    Lab Results   Component Value Date    PHOS 3.5 12/14/2016     HgBA1c:  No components found for: HGBA1C  Urine Culture:  No components found for: CURINE  Blood Culture:  No components found for: CBLOOD, CFUNGUSBL  ASSESSMENT AND PLAN:      Patient Active Hospital Problem List:   Cellulitis (7/14/2015)    Assessment: Right leg redness, pain, and swelling    Plan: [address] to dose related to CKD, Infectious Disease consult, elevate leg, acetaminophen, Tramadol, and Fentanyl as needed for pain control   Type 2 diabetes mellitus without complication ([facility] [address] (51/7/6813)    Assessment:

## 2017-10-04 NOTE — CARE COORDINATION
Case Management Initial Discharge Plan  Brianna Jaramillo,         Readmission Risk              Readmission Risk:        9.75       Age 72 or Greater:  0    Admitted from SNF or Requires Paid or Family Care:  0    Currently has CHF,COPD,ARF,CRI,or is on dialysis:  0    Takes more than 5 Prescription Medications:  4    Takes Digoxin,Insulin,Anticoagulants,Narcotics or ASA/Plavix:  [address] in Past 12 Months:  0    On Disability:  0    Patient Considers own Health:  3.75            Met with:patient to discuss discharge plans. Information verified: address, contacts, phone number, , insurance Yes  PCP: Meghan Elias MD  Date of last visit:     Insurance Provider: [facility] [place]    Discharge Planning  Current Residence:  Private Residence  Living Arrangements:  Spouse/Significant Other, Children, Family Members   Home has 1 stories/2 stairs to climb  Support Systems:  Spouse/Significant Other, Children  Current Services PTA:  None Supplier:   Patient able to perform ADL's:Independent  DME used to aid ambulation prior to admission: N/A/during admission    Potential Assistance Needed:  N/A    Pharmacy: [facility] on [address]   Potential Assistance Purchasing Medications:  No  Does patient want to participate in local refill/ meds to beds program?  N/A    Patient agreeable to home care: Yes  Freedom of choice provided:  n/a      Type of Home Care Services:  None  Patient expects to be discharged to:  home    Prior SNF/Rehab Placement and Facility: N/A  Agreeable to SNF/Rehab: No  [place] of choice provided: n/a   Evaluation: yes    Expected Discharge date:  10/06/17  Follow Up Appointment: Best Day/ Time: Thursday AM    Transportation provider: family  Transportation arrangements needed for discharge: No    Discharge Plan: Pt will discharge home independently but is agreeable to home care if he would need IV antibiotic therapy.         Electronically signed by REY Morales on 10/4/17 at 4:26 PM

## 2017-10-05 PROBLEM — N17.9 AKI (ACUTE KIDNEY INJURY) (HCC): Status: ACTIVE | Noted: 2017-10-05

## 2017-10-05 LAB
ABSOLUTE EOS #: 0.3 K/UL (ref 0–0.4)
ABSOLUTE LYMPH #: 2.1 K/UL (ref 1–4.8)
ABSOLUTE MONO #: 1.1 K/UL (ref 0.1–1.2)
ANION GAP SERPL CALCULATED.3IONS-SCNC: 14 MMOL/L (ref 9–17)
BASOPHILS # BLD: 0 %
BASOPHILS ABSOLUTE: 0 K/UL (ref 0–0.2)
BUN BLDV-MCNC: 31 MG/DL (ref 6–20)
BUN/CREAT BLD: ABNORMAL (ref 9–20)
CALCIUM SERPL-MCNC: 8.8 MG/DL (ref 8.6–10.4)
CHLORIDE BLD-SCNC: 102 MMOL/L (ref 98–107)
CO2: 21 MMOL/L (ref 20–31)
CREAT SERPL-MCNC: 1.32 MG/DL (ref 0.7–1.2)
DIFFERENTIAL TYPE: ABNORMAL
EOSINOPHILS RELATIVE PERCENT: 3 %
ESTIMATED AVERAGE GLUCOSE: 309 MG/DL
GFR AFRICAN AMERICAN: >60 ML/MIN
GFR NON-AFRICAN AMERICAN: 56 ML/MIN
GFR SERPL CREATININE-BSD FRML MDRD: ABNORMAL ML/MIN/{1.73_M2}
GFR SERPL CREATININE-BSD FRML MDRD: ABNORMAL ML/MIN/{1.73_M2}
GLUCOSE BLD-MCNC: 161 MG/DL (ref 75–110)
GLUCOSE BLD-MCNC: 165 MG/DL (ref 75–110)
GLUCOSE BLD-MCNC: 170 MG/DL (ref 70–99)
GLUCOSE BLD-MCNC: 184 MG/DL (ref 75–110)
GLUCOSE BLD-MCNC: 210 MG/DL (ref 75–110)
HBA1C MFR BLD: 12.4 % (ref 4–6)
HCT VFR BLD CALC: 40.5 % (ref 41–53)
HEMOGLOBIN: 13.4 G/DL (ref 13.5–17.5)
LYMPHOCYTES # BLD: 21 %
MCH RBC QN AUTO: 29.2 PG (ref 26–34)
MCHC RBC AUTO-ENTMCNC: 33 G/DL (ref 31–37)
MCV RBC AUTO: 88.5 FL (ref 80–100)
MONOCYTES # BLD: 11 %
PDW BLD-RTO: 13.8 % (ref 12.5–15.4)
PLATELET # BLD: 205 K/UL (ref 140–450)
PLATELET ESTIMATE: ABNORMAL
PMV BLD AUTO: 8.6 FL (ref 6–12)
POTASSIUM SERPL-SCNC: 4 MMOL/L (ref 3.7–5.3)
RBC # BLD: 4.57 M/UL (ref 4.5–5.9)
RBC # BLD: ABNORMAL 10*6/UL
SEG NEUTROPHILS: 65 %
SEGMENTED NEUTROPHILS ABSOLUTE COUNT: 6.4 K/UL (ref 1.8–7.7)
SODIUM BLD-SCNC: 137 MMOL/L (ref 135–144)
VANCOMYCIN TROUGH DATE LAST DOSE: NORMAL
VANCOMYCIN TROUGH DOSE AMOUNT: NORMAL
VANCOMYCIN TROUGH TIME LAST DOSE: NORMAL
VANCOMYCIN TROUGH: 15.3 UG/ML (ref 10–20)
WBC # BLD: 9.9 K/UL (ref 3.5–11)
WBC # BLD: ABNORMAL 10*3/UL

## 2017-10-05 PROCEDURE — 36415 COLL VENOUS BLD VENIPUNCTURE: CPT

## 2017-10-05 PROCEDURE — 76937 US GUIDE VASCULAR ACCESS: CPT

## 2017-10-05 PROCEDURE — 6370000000 HC RX 637 (ALT 250 FOR IP): Performed by: NURSE PRACTITIONER

## 2017-10-05 PROCEDURE — 83036 HEMOGLOBIN GLYCOSYLATED A1C: CPT

## 2017-10-05 PROCEDURE — 1200000000 HC SEMI PRIVATE

## 2017-10-05 PROCEDURE — 80202 ASSAY OF VANCOMYCIN: CPT

## 2017-10-05 PROCEDURE — 2580000003 HC RX 258: Performed by: PHYSICIAN ASSISTANT

## 2017-10-05 PROCEDURE — 6360000002 HC RX W HCPCS: Performed by: PHYSICIAN ASSISTANT

## 2017-10-05 PROCEDURE — G0378 HOSPITAL OBSERVATION PER HR: HCPCS

## 2017-10-05 PROCEDURE — 85025 COMPLETE CBC W/AUTO DIFF WBC: CPT

## 2017-10-05 PROCEDURE — 82947 ASSAY GLUCOSE BLOOD QUANT: CPT

## 2017-10-05 PROCEDURE — 80048 BASIC METABOLIC PNL TOTAL CA: CPT

## 2017-10-05 RX ORDER — SODIUM CHLORIDE 9 MG/ML
INJECTION, SOLUTION INTRAVENOUS CONTINUOUS
Status: DISCONTINUED | OUTPATIENT
Start: 2017-10-05 | End: 2017-10-06 | Stop reason: HOSPADM

## 2017-10-05 RX ADMIN — VANCOMYCIN HYDROCHLORIDE 1250 MG: 10 INJECTION, POWDER, LYOPHILIZED, FOR SOLUTION INTRAVENOUS at 09:39

## 2017-10-05 RX ADMIN — METOPROLOL TARTRATE 50 MG: 50 TABLET, FILM COATED ORAL at 21:47

## 2017-10-05 RX ADMIN — Medication 10 ML: at 21:43

## 2017-10-05 RX ADMIN — INSULIN LISPRO 1 UNITS: 100 INJECTION, SOLUTION INTRAVENOUS; SUBCUTANEOUS at 08:23

## 2017-10-05 RX ADMIN — INSULIN LISPRO 1 UNITS: 100 INJECTION, SOLUTION INTRAVENOUS; SUBCUTANEOUS at 17:35

## 2017-10-05 RX ADMIN — ASPIRIN 325 MG: 325 TABLET, COATED ORAL at 09:09

## 2017-10-05 RX ADMIN — INSULIN LISPRO 2 UNITS: 100 INJECTION, SOLUTION INTRAVENOUS; SUBCUTANEOUS at 12:08

## 2017-10-05 RX ADMIN — INSULIN LISPRO 1 UNITS: 100 INJECTION, SOLUTION INTRAVENOUS; SUBCUTANEOUS at 21:46

## 2017-10-05 RX ADMIN — ZOLPIDEM TARTRATE 10 MG: 5 TABLET ORAL at 00:12

## 2017-10-05 RX ADMIN — METOPROLOL TARTRATE 50 MG: 50 TABLET, FILM COATED ORAL at 09:11

## 2017-10-05 RX ADMIN — FUROSEMIDE 40 MG: 40 TABLET ORAL at 09:10

## 2017-10-05 RX ADMIN — ACETAMINOPHEN 650 MG: 325 TABLET ORAL at 12:08

## 2017-10-05 RX ADMIN — TRAMADOL HYDROCHLORIDE 100 MG: 50 TABLET, FILM COATED ORAL at 05:50

## 2017-10-05 RX ADMIN — TRAMADOL HYDROCHLORIDE 100 MG: 50 TABLET, FILM COATED ORAL at 12:09

## 2017-10-05 RX ADMIN — TRAMADOL HYDROCHLORIDE 100 MG: 50 TABLET, FILM COATED ORAL at 00:11

## 2017-10-05 RX ADMIN — TRAMADOL HYDROCHLORIDE 100 MG: 50 TABLET, FILM COATED ORAL at 17:33

## 2017-10-05 RX ADMIN — ACETAMINOPHEN 650 MG: 325 TABLET ORAL at 17:33

## 2017-10-05 RX ADMIN — PRAVASTATIN SODIUM 20 MG: 20 TABLET ORAL at 17:33

## 2017-10-05 RX ADMIN — CETIRIZINE HYDROCHLORIDE 10 MG: 10 TABLET ORAL at 09:09

## 2017-10-05 RX ADMIN — ACETAMINOPHEN 650 MG: 325 TABLET ORAL at 05:51

## 2017-10-05 RX ADMIN — LISINOPRIL 20 MG: 20 TABLET ORAL at 09:11

## 2017-10-05 RX ADMIN — HYDRALAZINE HYDROCHLORIDE 25 MG: 25 TABLET, FILM COATED ORAL at 09:10

## 2017-10-05 RX ADMIN — ACETAMINOPHEN 650 MG: 325 TABLET ORAL at 00:12

## 2017-10-05 RX ADMIN — Medication 10 ML: at 08:23

## 2017-10-05 RX ADMIN — LINAGLIPTIN 5 MG: 5 TABLET, FILM COATED ORAL at 09:10

## 2017-10-05 RX ADMIN — ALLOPURINOL 100 MG: 100 TABLET ORAL at 09:09

## 2017-10-05 RX ADMIN — VANCOMYCIN HYDROCHLORIDE 1250 MG: 10 INJECTION, POWDER, LYOPHILIZED, FOR SOLUTION INTRAVENOUS at 21:42

## 2017-10-05 ASSESSMENT — PAIN DESCRIPTION - ONSET
ONSET: PROGRESSIVE
ONSET: ON-GOING

## 2017-10-05 ASSESSMENT — ENCOUNTER SYMPTOMS
DIARRHEA: 0
DOUBLE VISION: 0
EYE PAIN: 0
ABDOMINAL PAIN: 0
NAUSEA: 0
ORTHOPNEA: 0
HEARTBURN: 0
CONSTIPATION: 0
BLURRED VISION: 0
BACK PAIN: 0
HEMOPTYSIS: 0
SPUTUM PRODUCTION: 0
COUGH: 0
VOMITING: 0
SHORTNESS OF BREATH: 0
PHOTOPHOBIA: 0
WHEEZING: 0
BLOOD IN STOOL: 0

## 2017-10-05 ASSESSMENT — PAIN DESCRIPTION - PROGRESSION

## 2017-10-05 ASSESSMENT — PAIN DESCRIPTION - PAIN TYPE
TYPE: ACUTE PAIN

## 2017-10-05 ASSESSMENT — PAIN DESCRIPTION - FREQUENCY
FREQUENCY: CONTINUOUS
FREQUENCY: CONTINUOUS

## 2017-10-05 ASSESSMENT — PAIN SCALES - GENERAL
PAINLEVEL_OUTOF10: 5
PAINLEVEL_OUTOF10: 7
PAINLEVEL_OUTOF10: 7
PAINLEVEL_OUTOF10: 5
PAINLEVEL_OUTOF10: 7
PAINLEVEL_OUTOF10: 5
PAINLEVEL_OUTOF10: 7
PAINLEVEL_OUTOF10: 6

## 2017-10-05 ASSESSMENT — PAIN DESCRIPTION - LOCATION
LOCATION: LEG

## 2017-10-05 ASSESSMENT — PAIN DESCRIPTION - ORIENTATION
ORIENTATION: RIGHT;LOWER
ORIENTATION: RIGHT
ORIENTATION: RIGHT

## 2017-10-05 ASSESSMENT — PAIN DESCRIPTION - DESCRIPTORS
DESCRIPTORS: ACHING
DESCRIPTORS: DULL

## 2017-10-05 NOTE — PROGRESS NOTES
Maria Del Rosario served Dr Annmarie Mccloud since consult for ID was put in yesterday and no one seen pt

## 2017-10-05 NOTE — CONSULTS
[facility] - Initial Consult Note  Today's Date and Time: 10/5/2017, 6:37 PM    Impression :   · DM  · Right lower leg cellulitis - better  · bilat feet fungal infection, athlete's feet, likely the cayuse of the above   · Leukocytosis - improved   · ARF - better    Recommendations   · Better on vanco - no organism though  · ACE wrap the right foot and if better in am anticipate disch on po doxy  · lamisil creams to feet to treat the port of entry; athlete's feet  · Long term prevention of such a relapse disc in length  With the patient     Chief complaint/reason for consultation:    right lower leg infection    History of Present Illness:   Dorys Ayala is a 59y.o.-year-old  male who was initially admitted on 10/3/2017. Patient seen at the request of  for right leg infection. noticed a swelling and reddness right lower leg x 3 days PTA and a progressive nausea, and fever - BS was elevated and he was admitted with right lower leg cellulitis and lack of appetite that improved inn the hospital.bry dopplers neg right lower leg and fever resolved, redness better, has been maintained on vanco  Iv. Has had few similar events in past with right leg cellulitis and some increased welling right leg following the cellultiis. Wears a compressive stocking at all time on the right leg. BS at this time is improved. On my exam I noticed fungal toes infection both feet. He was on his boat before this occurred and has had no injury or open wound from the trip. I have personally reviewed the past medical history, past surgical history, medications, social history, and family history, and I have updated the database accordingly.   Past Medical History:     Past Medical History:   Diagnosis Date    Back pain     Cellulitis     Diabetes mellitus ([address]     Gout     Hypertension        Past Surgical  History:     Past Surgical History:   Procedure Laterality Date  ANKLE SURGERY      CARPAL TUNNEL RELEASE Bilateral     KNEE SURGERY Bilateral        Medications:      vancomycin  1,250 mg Intravenous Q12H    allopurinol  100 mg Oral Daily    aspirin  325 mg Oral Daily    cetirizine  10 mg Oral Daily    furosemide  40 mg Oral Daily    hydrALAZINE  25 mg Oral BID    lisinopril  20 mg Oral Daily    pravastatin  20 mg Oral Dinner    insulin lispro  0-6 Units Subcutaneous TID WC    insulin lispro  0-3 Units Subcutaneous Nightly    acetaminophen  650 mg Oral 4 times per day    linagliptin  5 mg Oral Daily    vancomycin (VANCOCIN) intermittent dosing (placeholder)   Other RX Placeholder    sodium chloride flush  10 mL Intravenous 2 times per day    metoprolol tartrate  50 mg Oral BID       Social History:     Social History     Social History    Marital status:      Spouse name: N/A    Number of children: N/A    Years of education: N/A     Occupational History    Not on file. Social History Main Topics    Smoking status: Never Smoker    Smokeless tobacco: Not on file    Alcohol use 0.0 oz/week     0 Standard drinks or equivalent per week      Comment: 3 drinks / week.  Drug use: No    Sexual activity: Not on file     Other Topics Concern    Not on file     Social History Narrative       Family History:     Family History   Problem Relation Age of Onset    Stroke Maternal Grandmother     Stroke Maternal Grandfather         Allergies:   Ampicillin and Pcn [penicillins]     Review of Systems:   Constitutional: No fevers or chills. No systemic complaints  Head: No headaches  Eyes: No double vision or blurry vision. ENT: No sore throat or runny nose. .   Cardiovascular: No chest pain or palpitations. Lung: No shortness of breath or cough. Abdomen: nauseated and no diarrhea  Genitourinary: No increased urinary frequency,   Musculoskeletal:Right leg redness and pain  Neurologic: No headache, weakness, numbness, or tingling.     Physical

## 2017-10-06 VITALS
WEIGHT: 292.8 LBS | HEIGHT: 71 IN | SYSTOLIC BLOOD PRESSURE: 110 MMHG | TEMPERATURE: 97.6 F | DIASTOLIC BLOOD PRESSURE: 76 MMHG | RESPIRATION RATE: 16 BRPM | HEART RATE: 78 BPM | OXYGEN SATURATION: 98 % | BODY MASS INDEX: 40.99 KG/M2

## 2017-10-06 LAB
ANION GAP SERPL CALCULATED.3IONS-SCNC: 13 MMOL/L (ref 9–17)
BUN BLDV-MCNC: 24 MG/DL (ref 6–20)
BUN/CREAT BLD: ABNORMAL (ref 9–20)
CALCIUM SERPL-MCNC: 8.7 MG/DL (ref 8.6–10.4)
CHLORIDE BLD-SCNC: 102 MMOL/L (ref 98–107)
CO2: 20 MMOL/L (ref 20–31)
CREAT SERPL-MCNC: 1.17 MG/DL (ref 0.7–1.2)
GFR AFRICAN AMERICAN: >60 ML/MIN
GFR NON-AFRICAN AMERICAN: >60 ML/MIN
GFR SERPL CREATININE-BSD FRML MDRD: ABNORMAL ML/MIN/{1.73_M2}
GFR SERPL CREATININE-BSD FRML MDRD: ABNORMAL ML/MIN/{1.73_M2}
GLUCOSE BLD-MCNC: 148 MG/DL (ref 75–110)
GLUCOSE BLD-MCNC: 162 MG/DL (ref 70–99)
GLUCOSE BLD-MCNC: 259 MG/DL (ref 75–110)
POTASSIUM SERPL-SCNC: 4.2 MMOL/L (ref 3.7–5.3)
SODIUM BLD-SCNC: 135 MMOL/L (ref 135–144)

## 2017-10-06 PROCEDURE — 82947 ASSAY GLUCOSE BLOOD QUANT: CPT

## 2017-10-06 PROCEDURE — 6370000000 HC RX 637 (ALT 250 FOR IP): Performed by: INTERNAL MEDICINE

## 2017-10-06 PROCEDURE — 36415 COLL VENOUS BLD VENIPUNCTURE: CPT

## 2017-10-06 PROCEDURE — 2580000003 HC RX 258: Performed by: PHYSICIAN ASSISTANT

## 2017-10-06 PROCEDURE — 6360000002 HC RX W HCPCS: Performed by: PHYSICIAN ASSISTANT

## 2017-10-06 PROCEDURE — 80048 BASIC METABOLIC PNL TOTAL CA: CPT

## 2017-10-06 PROCEDURE — G0378 HOSPITAL OBSERVATION PER HR: HCPCS

## 2017-10-06 PROCEDURE — 6370000000 HC RX 637 (ALT 250 FOR IP): Performed by: NURSE PRACTITIONER

## 2017-10-06 PROCEDURE — 2580000003 HC RX 258: Performed by: INTERNAL MEDICINE

## 2017-10-06 PROCEDURE — 96366 THER/PROPH/DIAG IV INF ADDON: CPT

## 2017-10-06 RX ORDER — PRENATAL VIT 91/IRON/FOLIC/DHA 28-975-200
COMBINATION PACKAGE (EA) ORAL
Qty: 1 TUBE | Refills: 1 | Status: SHIPPED | OUTPATIENT
Start: 2017-10-06 | End: 2017-10-13

## 2017-10-06 RX ORDER — CEPHALEXIN 500 MG/1
500 CAPSULE ORAL EVERY 6 HOURS SCHEDULED
Status: DISCONTINUED | OUTPATIENT
Start: 2017-10-06 | End: 2017-10-06 | Stop reason: HOSPADM

## 2017-10-06 RX ORDER — CEPHALEXIN 500 MG/1
500 CAPSULE ORAL 4 TIMES DAILY
Qty: 28 CAPSULE | Refills: 0 | Status: SHIPPED | OUTPATIENT
Start: 2017-10-06 | End: 2017-10-13

## 2017-10-06 RX ADMIN — INSULIN LISPRO 3 UNITS: 100 INJECTION, SOLUTION INTRAVENOUS; SUBCUTANEOUS at 12:20

## 2017-10-06 RX ADMIN — TRAMADOL HYDROCHLORIDE 100 MG: 50 TABLET, FILM COATED ORAL at 12:20

## 2017-10-06 RX ADMIN — METOPROLOL TARTRATE 50 MG: 50 TABLET, FILM COATED ORAL at 08:44

## 2017-10-06 RX ADMIN — ACETAMINOPHEN 650 MG: 325 TABLET ORAL at 12:20

## 2017-10-06 RX ADMIN — HYDRALAZINE HYDROCHLORIDE 25 MG: 25 TABLET, FILM COATED ORAL at 08:44

## 2017-10-06 RX ADMIN — TRAMADOL HYDROCHLORIDE 100 MG: 50 TABLET, FILM COATED ORAL at 00:04

## 2017-10-06 RX ADMIN — ACETAMINOPHEN 650 MG: 325 TABLET ORAL at 06:11

## 2017-10-06 RX ADMIN — LINAGLIPTIN 5 MG: 5 TABLET, FILM COATED ORAL at 08:44

## 2017-10-06 RX ADMIN — CETIRIZINE HYDROCHLORIDE 10 MG: 10 TABLET ORAL at 08:44

## 2017-10-06 RX ADMIN — ALLOPURINOL 100 MG: 100 TABLET ORAL at 08:44

## 2017-10-06 RX ADMIN — ASPIRIN 325 MG: 325 TABLET, COATED ORAL at 08:44

## 2017-10-06 RX ADMIN — CEPHALEXIN 500 MG: 500 CAPSULE ORAL at 12:20

## 2017-10-06 RX ADMIN — SODIUM CHLORIDE: 9 INJECTION, SOLUTION INTRAVENOUS at 00:06

## 2017-10-06 RX ADMIN — ZOLPIDEM TARTRATE 10 MG: 5 TABLET ORAL at 00:04

## 2017-10-06 RX ADMIN — CEPHALEXIN 500 MG: 500 CAPSULE ORAL at 16:58

## 2017-10-06 RX ADMIN — INSULIN LISPRO 1 UNITS: 100 INJECTION, SOLUTION INTRAVENOUS; SUBCUTANEOUS at 08:44

## 2017-10-06 RX ADMIN — ACETAMINOPHEN 650 MG: 325 TABLET ORAL at 00:05

## 2017-10-06 RX ADMIN — TRAMADOL HYDROCHLORIDE 100 MG: 50 TABLET, FILM COATED ORAL at 06:12

## 2017-10-06 RX ADMIN — VANCOMYCIN HYDROCHLORIDE 1250 MG: 10 INJECTION, POWDER, LYOPHILIZED, FOR SOLUTION INTRAVENOUS at 08:44

## 2017-10-06 ASSESSMENT — PAIN DESCRIPTION - LOCATION: LOCATION: LEG

## 2017-10-06 ASSESSMENT — PAIN DESCRIPTION - DESCRIPTORS: DESCRIPTORS: DULL

## 2017-10-06 ASSESSMENT — PAIN SCALES - GENERAL
PAINLEVEL_OUTOF10: 7
PAINLEVEL_OUTOF10: 4
PAINLEVEL_OUTOF10: 5
PAINLEVEL_OUTOF10: 4
PAINLEVEL_OUTOF10: 6

## 2017-10-06 ASSESSMENT — PAIN DESCRIPTION - ONSET: ONSET: ON-GOING

## 2017-10-06 ASSESSMENT — PAIN DESCRIPTION - FREQUENCY: FREQUENCY: CONTINUOUS

## 2017-10-06 ASSESSMENT — PAIN DESCRIPTION - PROGRESSION: CLINICAL_PROGRESSION: GRADUALLY IMPROVING

## 2017-10-06 ASSESSMENT — PAIN DESCRIPTION - PAIN TYPE: TYPE: ACUTE PAIN

## 2017-10-06 ASSESSMENT — PAIN DESCRIPTION - ORIENTATION: ORIENTATION: RIGHT

## 2017-10-06 NOTE — PROGRESS NOTES
ear pain and tinnitus. Eyes: Negative for blurred vision, double vision, photophobia and pain. Respiratory: Negative for cough, hemoptysis, sputum production, shortness of breath and wheezing. Cardiovascular: Positive for leg swelling. Negative for chest pain, palpitations, orthopnea, claudication and PND. Gastrointestinal: Negative for abdominal pain, blood in stool, constipation, diarrhea, heartburn, nausea and vomiting. Genitourinary: Negative. Musculoskeletal: Positive for myalgias. Negative for back pain, joint pain and neck pain. Skin: Negative for itching and rash. Neurological: Negative for dizziness, tingling, tremors, seizures, weakness and headaches. Endo/Heme/Allergies: Negative for environmental allergies and polydipsia. Does not bruise/bleed easily. Psychiatric/Behavioral: Negative for depression, hallucinations, memory loss, substance abuse and suicidal ideas. The patient does not have insomnia.         Physical Examination:  /79  Pulse 81  Temp 98.7 °F (37.1 °C) (Oral)   Resp 16  Ht 5' 11\" (1.803 m)  Wt 292 lb 12.8 oz (132.8 kg)  SpO2 98%  BMI 40.84 kg/m2    General appearance - alert, well appearing, and in no distress  Mental status - alert, oriented to person, place, and time  Eyes - pupils equal and reactive, extraocular eye movements intact  Nose - normal and patent, no erythema, discharge or polyps  Mouth - mucous membranes moist, pharynx normal without lesions  Neck - supple, no significant adenopathy  Lymphatics - no palpable lymphadenopathy, no hepatosplenomegaly  Chest - clear to auscultation, no wheezes, rales or rhonchi, symmetric air entry  Heart - normal rate, regular rhythm, normal S1, S2, no murmurs, rubs, clicks or gallops  Abdomen - soft, nontender, nondistended, no masses or organomegaly  Back exam - full range of motion, no tenderness, palpable spasm or pain on motion  Neurological - alert, oriented, normal speech, no focal findings or movement disorder noted  Musculoskeletal - RLE with 2+ edema distally, currently bandaged. Extremities - intact peripheral pulses  Skin - erythema in distal RLE     Labs:  Hematology:  Recent Labs      10/03/17   1759  10/05/17   0533   WBC  15.0*  9.9   HGB  13.8  13.4*   HCT  41.4  40.5*   PLT  198  205     Chemistry:  Recent Labs      10/03/17   1759  10/05/17   0533   NA  133*  137   K  4.6  4.0   CL  96*  102   CO2  21  21   GLUCOSE  194*  170*   BUN  38*  31*   CREATININE  2.06*  1.32*   CALCIUM  9.4  8.8     Glucose:  Recent Labs      10/03/17   1759  10/05/17   0533   GLUCOSE  194*  170*     HgBA1c:  Lab Results   Component Value Date    LABA1C 12.4 10/05/2017     Patient Active Problem List   Diagnosis    Cellulitis    Type 2 diabetes mellitus without complication ([facility] [address]    Essential hypertension    Morbid obesity due to excess calories (HCC)    Chronic kidney disease (CKD)    DEBBI (acute kidney injury) ([facility]         Plan:No acute changes. ID input noted. Creatinine is above the baseline of 1.3. ACEI, lasix, stopped and IVF started. Medications and order reviewed. Continue POC. Agree with ID input.     Electronically signed by Noelle Begum MD on 10/5/2017 at 10:52 PM

## 2017-10-06 NOTE — PROGRESS NOTES
[facility] - Initial Consult Note  Today's Date and Time: 10/6/2017, 11:39 AM    Impression :   · DM  · Right lower leg cellulitis - better  · bilat feet fungal infection, athlete's feet, likely the cayuse of the above   · Leukocytosis - improved   · ARF - better    Recommendations   · Keep ACE wrap and stop vanco  · Start keflex x 7days  · lamisil bid both feet x 7 days     Chief complaint/reason for consultation:    right lower leg infection    History of Present Illness:   Aster Ellsworth is a 59y.o.-year-old  male who was initially admitted on 10/3/2017. Patient seen at the request of  for right leg infection. noticed a swelling and reddness right lower leg x 3 days PTA and a progressive nausea, and fever - BS was elevated and he was admitted with right lower leg cellulitis and lack of appetite that improved inn the hospital.bry dopplers neg right lower leg and fever resolved, redness better, has been maintained on vanco  Iv. Has had few similar events in past with right leg cellulitis and some increased welling right leg following the cellultiis. Wears a compressive stocking at all time on the right leg. BS at this time is improved. On my exam I noticed fungal toes infection both feet. He was on his boat before this occurred and has had no injury or open wound from the trip.     Interval changes 10/06/17   No fever and no nausea, vomiting, diarrhea and right leg pain and swelling down  Right leg looks like erysipelas today  Severe allergy to [facility]  Seems to have tolerated keflex in past    Physical Examination :     Patient Vitals for the past 24 hrs:   BP Temp Temp src Pulse Resp SpO2   10/06/17 0744 110/76 97.6 °F (36.4 °C) Oral 78 16 98 %   10/05/17 2015 120/79 98.7 °F (37.1 °C) Oral 81 16 98 %       General Appearance: Awake, alert, and in no apparent distress, obese  Head:  Normocephalic, no trauma  Eyes: Pupils equal, round, reactive,   ENT: Oropharynx clear,    Neck:Supple, without lymphadenopathy. Pulmonary/Chest: Clear to auscultation, . Cardiovascular: Regular rate and normal S1S2  Abdomen: Soft, non tender. Bowel sounds normal. No organomegaly  All four Extremities: No cyanosis, right lower leg redness and swelling are both better - today some signs of erysipelas and redness is much better  Neurologic: No gross sensory or motor deficits. Skin: Warm and dry with good turgor. No signs of peripheral arterial or venous insufficiency. Medical Decision Making:   I have independently reviewed/ordered the following labs:    CBC with Differential:   Recent Labs      10/03/17   1759  10/05/17   0533   WBC  15.0*  9.9   HGB  13.8  13.4*   HCT  41.4  40.5*   PLT  198  205   LYMPHOPCT  9  21   MONOPCT  9  11     BMP:   Recent Labs      10/05/17   0533  10/06/17   0508   NA  137  135   K  4.0  4.2   CL  102  102   CO2  21  20   BUN  31*  24*   CREATININE  1.32*  1.17     Hepatic Function Panel: No results for input(s): PROT, LABALBU, BILIDIR, IBILI, BILITOT, ALKPHOS, ALT, AST in the last 72 hours. No results for input(s): RPR in the last 72 hours. No results for input(s): HIV in the last 72 hours. No results for input(s): BC in the last 72 hours. Lab Results   Component Value Date    MUCUS NOT REPORTED 12/11/2016    RBC 4.57 10/05/2017    TRICHOMONAS NOT REPORTED 12/11/2016    WBC 9.9 10/05/2017    YEAST NOT REPORTED 12/11/2016    TURBIDITY CLEAR 12/11/2016     Lab Results   Component Value Date    CREATININE 1.17 10/06/2017    GLUCOSE 162 10/06/2017     Thank you for allowing us to participate in the care of this patient. Please call with questions.     Roger Mcconnell MD  - Office: [phone]

## 2017-11-09 NOTE — DISCHARGE SUMMARY
Internal Medicine Discharge Summary    Patient ID: Jay Lozano  MRN: [id number]     Acct:  [de-identified]       Patient's PCP: Marsha Wang MD    Admit Date: 10/3/2017     Discharge Date: 10/6/2017      Admitting Physician: Blane Lovell MD    Discharge Physician: Dilan Claros CNP     Active Discharge Diagnoses:    Primary Problem  <principal problem not specified>  [place] Problems    Diagnosis Date Noted    [facility] [N17.9] 10/05/2017    Essential hypertension [I10] 10/04/2017    Chronic kidney disease (CKD) [N18.9] 10/04/2017    Type 2 diabetes mellitus without complication ([facility] [B94.6] 12/09/2016    Cellulitis [L03.90] 07/14/2015       The patient was seen and examined on day of discharge and this discharge summary is in conjunction with any daily progress note from day of discharge. Code Status:  Prior    Hospital Course:    Jay Lozano is a 59y.o.-year-old  male who was initially admitted on 10/3/2017. Patient seen at the request of  for right leg infection. noticed a swelling and reddness right lower leg x 3 days PTA and a progressive nausea, and fever - BS was elevated and he was admitted with right lower leg cellulitis and lack of appetite that improved inn the hospital.bry dopplers neg right lower leg and fever resolved, redness better, has been maintained on vanco  Iv. Has had few similar events in past with right leg cellulitis and some increased welling right leg following the cellultiis. Wears a compressive stocking at all time on the right leg. BS at this time is improved. On my exam I noticed fungal toes infection both feet. He was on his boat before this occurred and has had no injury or open wound from the trip.             Consults:  PHARMACY TO DOSE VANCOMYCIN  IP CONSULT TO PRIMARY CARE PROVIDER  IP CONSULT TO INFECTIOUS DISEASES  IP CONSULT TO IV TEAM    Disposition: stable    Discharged

## 2020-03-16 ENCOUNTER — OFFICE VISIT (OUTPATIENT)
Dept: ORTHOPEDIC SURGERY | Age: 60
End: 2020-03-16
Payer: COMMERCIAL

## 2020-03-16 VITALS
SYSTOLIC BLOOD PRESSURE: 112 MMHG | DIASTOLIC BLOOD PRESSURE: 79 MMHG | WEIGHT: 294 LBS | HEIGHT: 71 IN | HEART RATE: 106 BPM | BODY MASS INDEX: 41.16 KG/M2

## 2020-03-16 PROCEDURE — 3017F COLORECTAL CA SCREEN DOC REV: CPT | Performed by: ORTHOPAEDIC SURGERY

## 2020-03-16 PROCEDURE — 1036F TOBACCO NON-USER: CPT | Performed by: ORTHOPAEDIC SURGERY

## 2020-03-16 PROCEDURE — G8427 DOCREV CUR MEDS BY ELIG CLIN: HCPCS | Performed by: ORTHOPAEDIC SURGERY

## 2020-03-16 PROCEDURE — G8484 FLU IMMUNIZE NO ADMIN: HCPCS | Performed by: ORTHOPAEDIC SURGERY

## 2020-03-16 PROCEDURE — 99203 OFFICE O/P NEW LOW 30 MIN: CPT | Performed by: ORTHOPAEDIC SURGERY

## 2020-03-16 PROCEDURE — G8419 CALC BMI OUT NRM PARAM NOF/U: HCPCS | Performed by: ORTHOPAEDIC SURGERY

## 2020-03-16 ASSESSMENT — ENCOUNTER SYMPTOMS
COUGH: 0
COLOR CHANGE: 0
SHORTNESS OF BREATH: 0
CHEST TIGHTNESS: 0
NAUSEA: 0
DIARRHEA: 0
ABDOMINAL PAIN: 0
APNEA: 0
VOMITING: 0
ABDOMINAL DISTENTION: 0
CONSTIPATION: 0

## 2020-03-16 NOTE — PROGRESS NOTES
[address] [facility]  [address] [place] [id number]  Dept: [phone]  Dept Fax: [phone]                                                             Right Knee - New Patient    Subjective:     Chief Complaint   Patient presents with    Knee Pain     Right Feb 19 Fell on the knee. Progressive pain      HPI:     Padmini Delgadillo presents today for Right knee pain. The pain has been present since February of 2019. The patient recalls a specific injury where he slid on ice and fell directly on his right knee. The patient cannot take NSAIDs because of his kidney function. The patient has tried Tylenol and home exercises with no improvement. The pain is now described as Achy. There is no pain on weight bearing. The knee has not swelled. There is not painful popping and clicking. The knee has not caught or locked up. The knee has not given out. It is stiff upon arising from sitting. It is painful to go up and down stairs and sit for a prolonged time. The patient has not had a cortisone injection. The patient has not tried a lubrication injection. The patient has not tried physical therapy. The patient has not had surgery. The opposite knee is okay. The patient reports that he is not ready for a total knee replacement. He has lost 100 pounds. ROS:   Review of Systems   Constitutional: Positive for activity change. Negative for appetite change, fatigue and fever. Respiratory: Negative for apnea, cough, chest tightness and shortness of breath. Cardiovascular: Negative for chest pain, palpitations and leg swelling. Gastrointestinal: Negative for abdominal distention, abdominal pain, constipation, diarrhea, nausea and vomiting. Genitourinary: Negative for difficulty urinating, dysuria and hematuria. Musculoskeletal: Positive for arthralgias. Negative for gait problem, joint swelling and myalgias.    Skin: Negative for color insecurity     Worry: None     Inability: None    Transportation needs     Medical: None     Non-medical: None   Tobacco Use    Smoking status: Never Smoker    Smokeless tobacco: Never Used   Substance and Sexual Activity    Alcohol use: Yes     Alcohol/week: 0.0 standard drinks     Comment: 3 drinks / week.  Drug use: No    Sexual activity: None   Lifestyle    Physical activity     Days per week: None     Minutes per session: None    Stress: None   Relationships    Social connections     Talks on phone: None     Gets together: None     Attends Buddhist service: None     Active member of club or organization: None     Attends meetings of clubs or organizations: None     Relationship status: None    Intimate partner violence     Fear of current or ex partner: None     Emotionally abused: None     Physically abused: None     Forced sexual activity: None   Other Topics Concern    None   Social History Narrative    None       Family History:  Family History   Problem Relation Age of Onset    Stroke Maternal Grandmother     Stroke Maternal Grandfather        Vitals:   /79   Pulse 106   Ht 5' 11\" (1.803 m)   Wt 294 lb (133.4 kg)   BMI 41.00 kg/m²  Body mass index is 41 kg/m². Physical Examination:     Orthopedics:    GENERAL: Alert and oriented X3 in no acute distress. SKIN: Intact without lesions or ulcerations. NEURO: Musculoskeletal and axillary nerves intact to sensory and motor testing. VASC: Capillary refill is less than 3 seconds. KNEE EXAM    LOCATION: Right Knee  GEN: Alert and oriented X 3, in no acute distress. GAIT: The patient's gait was observed while entering the exam room and was noted to be antalgic. The extremity is in valgus alignment. SKIN: Intact without rashes, lesions, or ulcerations. No obvious deformity or swelling. NEURO: The patient responds to light touch throughout right LE. Patellar and Achilles reflexes are 2/4.   VASC: The right LE is neurovascularly intact with 2/4 DP and 2/4 PT pulses. Brisk capillary refill. ROM: 10/116 degrees. There is no effusion. Valgus deformity is not passively correctable. MUSC: Decreased quad tone. LIGAMENT: Lachman's test is Negative with Good endpoint. Anterior drawer Negative. Posterior drawer Negative. There is No varus instability at 0 degrees and No varus instability at 30 degrees. There is No valgus instability at 0 degrees and No valgus instability at 30 degrees. SPECIAL: Pamela test is negative with no clunks, (Laterally) crepitation, and (Laterally) pain. There is a negative pivot shift. PALP: There is medial and lateral joint line pain. Assessment:     1. Primary osteoarthritis of right knee      Procedures:    Procedure: no    Radiology:   X-rays taken today were reviewed with the patient. KNEE X-RAY    4 views of the right knee including AP, bilateral tunnel, and lateral in the upright position, and skyline views reveal valgus alignment with no fracture or dislocation. Kellgren grade IV changes of osteoarthritis (joint space narrowing, osteophyte, subchondral sclerosis, bony deformity/cyst) of the medial and lateral compartment(s). No osseous loose bodies. No bony erosion or periosteal reaction. No soft tissue masses. Impression: Severe Osteoarthritis of the right knee. Plan:   Treatment : I reviewed the X-ray with the patient. We discussed the etiologies and natural histories of primary osteoarthritis of the right knee. We discussed the various treatment alternatives including anti-inflammatory medications, physical therapy, injections, further imaging studies and as a last result surgery. During today's visit, I explained to the patient that he has bone on bone arthritis with his right knee. I explained to him that he should go to physical therapy for an osteoarthritis protocol.  We can also give him a script for a medial  brace to help offset some of the load while he is up and walking around. If he wants a quick fix then we can do a cortisone injection to help decrease the inflammation. However, the cortisone injection may raise blood sugars and he is a borderline diabetic. I told the patient that he should routinely check his blood sugars because that is the best thing he can do for himself at this moment. Eventually, the only thing that is going to fix his pre-existing arthritis is with a total knee replacement, but I understand that he is not ready for that right now. The patient has opted for going to physical therapy for an OA protocol, getting a medial  brace, and living with how his right knee is. A physical therapy prescription was given. Patient should return to the clinic PRN to follow up with Dhruv Marks D.O. . The patient will call the office immediately with any problems. No orders of the defined types were placed in this encounter. Orders Placed This Encounter   Procedures   [facility]     Referral Priority:   Routine     Referral Type:   Eval and Treat     Referral Reason:   Specialty Services Required     Requested Specialty:   Physical Therapy     Number of Visits Requested:   1    DME Order for (Specify) as OP     - DME device ordered - lateral  brace, right knee  - Diagnosis:osteoarthritis right knee  - Length of Need:3 years       IPurvi MS, AT, ATC, am scribing for and in the presence of Dhruv Marks D.O.. 3/17/2020  12:32 PM          Electronically signed by Mindy Rosado DO, on 3/17/2020 at 12:32 PM           IDhruv DO, have personally seen this patient and I have reviewed the CC, PMH, FHX and Social History as provided by other clinical staff. I reassessed the HPI and ROS as scribed by Purvi Balderrama ATC in my presence and it is both accurate and complete. Thereafter, I personally performed the PE, reviewed the imaging and established the DX and POC.  I agree with the documentation provided by the . I have reviewed all documentation in its entirety prior to providing my signature indicating agreement. Any areas of disagreement are noted on the chart.     Electronically signed by Wilma Savage DO on 3/17/2020 at 12:33 PM

## 2020-06-09 ENCOUNTER — OFFICE VISIT (OUTPATIENT)
Dept: FAMILY MEDICINE CLINIC | Age: 60
End: 2020-06-09
Payer: COMMERCIAL

## 2020-06-09 ENCOUNTER — HOSPITAL ENCOUNTER (OUTPATIENT)
Age: 60
Setting detail: SPECIMEN
Discharge: HOME | End: 2020-06-09
Payer: COMMERCIAL

## 2020-06-09 PROCEDURE — G8417 CALC BMI ABV UP PARAM F/U: HCPCS | Performed by: NURSE PRACTITIONER

## 2020-06-09 PROCEDURE — G8427 DOCREV CUR MEDS BY ELIG CLIN: HCPCS | Performed by: NURSE PRACTITIONER

## 2020-06-09 PROCEDURE — 99202 OFFICE O/P NEW SF 15 MIN: CPT | Performed by: NURSE PRACTITIONER

## 2020-06-09 PROCEDURE — 1036F TOBACCO NON-USER: CPT | Performed by: NURSE PRACTITIONER

## 2020-06-09 PROCEDURE — 3017F COLORECTAL CA SCREEN DOC REV: CPT | Performed by: NURSE PRACTITIONER

## 2020-06-09 RX ORDER — AZITHROMYCIN 250 MG/1
250 TABLET, FILM COATED ORAL DAILY
COMMUNITY
End: 2020-08-31 | Stop reason: ALTCHOICE

## 2020-06-09 ASSESSMENT — PATIENT HEALTH QUESTIONNAIRE - PHQ9
2. FEELING DOWN, DEPRESSED OR HOPELESS: 0
1. LITTLE INTEREST OR PLEASURE IN DOING THINGS: 0
SUM OF ALL RESPONSES TO PHQ QUESTIONS 1-9: 0
SUM OF ALL RESPONSES TO PHQ QUESTIONS 1-9: 0
SUM OF ALL RESPONSES TO PHQ9 QUESTIONS 1 & 2: 0

## 2020-06-09 NOTE — PATIENT INSTRUCTIONS
Patient Education        Coronavirus ([id number]): Care Instructions  Overview  The coronavirus disease (COVID-19) is caused by a virus. Symptoms may include a fever, a cough, and shortness of breath. It mainly spreads person-to-person through droplets from coughing and sneezing. The virus also can spread when people are in close contact with someone who is infected. Most people have mild symptoms and can take care of themselves at home. If their symptoms get worse, they may need care in a hospital. There is no medicine to fight the virus. It's important to not spread the virus to others. If you have COVID-19, wear a face cover anytime you are around other people. You need to isolate yourself while you are sick. Your doctor or local public health official will tell you when you no longer need to be isolated. Leave your home only if you need to get medical care. Follow-up care is a key part of your treatment and safety. Be sure to make and go to all appointments, and call your doctor if you are having problems. It's also a good idea to know your test results and keep a list of the medicines you take. How can you care for yourself at home? · Get extra rest. It can help you feel better. · Drink plenty of fluids. This helps replace fluids lost from fever. Fluids also help ease a scratchy throat. Water, soup, fruit juice, and hot tea with lemon are good choices. · Take acetaminophen (such as Tylenol) to reduce a fever. It may also help with muscle aches. Read and follow all instructions on the label. · Sponge your body with lukewarm water to help with fever. Don't use cold water or ice. · Use petroleum jelly on sore skin. This can help if the skin around your nose and lips becomes sore from rubbing a lot with tissues. Tips for isolation  · Wear a cloth face cover when you are around other people. It can help stop the spread of the virus when you cough or sneeze. · Limit contact with people in your home.  If contact your doctor if:  · Your symptoms get worse. · You are not getting better as expected. Call before you go to the doctor's office. Follow their instructions. And wear a cloth face cover. Current as of: May 8, 2020               Content Version: 12.5  © 2006-2020 [employer]. Care instructions adapted under license by [facility] ([facility]). If you have questions about a medical condition or this instruction, always ask your healthcare professional. [address] any warranty or liability for your use of this information.

## 2020-06-10 VITALS
OXYGEN SATURATION: 99 % | TEMPERATURE: 99.7 F | HEIGHT: 71 IN | RESPIRATION RATE: 16 BRPM | BODY MASS INDEX: 37.24 KG/M2 | HEART RATE: 100 BPM | WEIGHT: 266 LBS

## 2020-06-10 ASSESSMENT — ENCOUNTER SYMPTOMS
VOMITING: 0
NAUSEA: 0
DIARRHEA: 0
BACK PAIN: 0
SORE THROAT: 0
SINUS PAIN: 0
RHINORRHEA: 1
SHORTNESS OF BREATH: 0
EYE PAIN: 0
COUGH: 1
ABDOMINAL PAIN: 0

## 2020-06-10 NOTE — PROGRESS NOTES
(LASIX) 40 MG tablet Take 40 mg by mouth daily        No current facility-administered medications for this visit. Allergies   Allergen Reactions    Ampicillin Swelling     Swelling of throat.  Pcn [Penicillins] Swelling     Throat swelling. Subjective:      Review of Systems   Constitutional: Positive for fatigue and fever. Negative for chills. HENT: Positive for congestion and rhinorrhea. Negative for ear pain, sinus pain and sore throat. Eyes: Negative for pain and visual disturbance. Respiratory: Positive for cough. Negative for shortness of breath. Cardiovascular: Negative for chest pain and palpitations. Gastrointestinal: Negative for abdominal pain, diarrhea, nausea and vomiting. Genitourinary: Negative for penile pain and testicular pain. Musculoskeletal: Negative for back pain, joint swelling and neck pain. Skin: Negative for rash. Neurological: Negative for dizziness and light-headedness. Hematological: Does not bruise/bleed easily. All other systems reviewed and are negative. Objective:     Physical Exam  Vitals signs and nursing note reviewed. Constitutional:       Appearance: Normal appearance. HENT:      Right Ear: Tympanic membrane normal.      Left Ear: Tympanic membrane normal.      Nose: Nose normal.      Mouth/Throat:      Mouth: Mucous membranes are moist.      Pharynx: Oropharynx is clear. Cardiovascular:      Rate and Rhythm: Normal rate. Pulmonary:      Effort: Pulmonary effort is normal.      Breath sounds: Wheezing present. Skin:     General: Skin is warm and dry. Neurological:      General: No focal deficit present. Mental Status: He is alert and oriented to person, place, and time. Pulse 100   Temp 99.7 °F (37.6 °C)   Resp 16   Ht 5' 11\" (1.803 m)   Wt 266 lb (120.7 kg)   SpO2 99%   BMI 37.10 kg/m²   Lab Review   No visits with results within 2 Month(s) from this visit.    Latest known visit with results is:  Seg Neutrophils 10/05/2017 65     Lymphocytes 10/05/2017 21     Monocytes 10/05/2017 11     Eosinophils % 10/05/2017 3     Basophils 10/05/2017 0     Segs Absolute 10/05/2017 6.40     Absolute Lymph # 10/05/2017 2.10     Absolute Mono # 10/05/2017 1.10     Absolute Eos # 10/05/2017 0.30     Basophils Absolute 10/05/2017 0.00     Glucose 10/05/2017 170*    BUN 10/05/2017 31*    CREATININE 10/05/2017 1.32*    Bun/Cre Ratio 10/05/2017 NOT REPORTED     Calcium 10/05/2017 8.8     Sodium 10/05/2017 137     Potassium 10/05/2017 4.0     Chloride 10/05/2017 102     CO2 10/05/2017 21     Anion Gap 10/05/2017 14     GFR Non- 10/05/2017 56*    GFR  10/05/2017 >60     GFR Comment 10/05/2017          GFR Staging 10/05/2017 NOT REPORTED     Hemoglobin A1C 10/05/2017 12.4*    Estimated Avg Glucose 10/05/2017 309     Vancomycin Tr 10/05/2017 15.3     Vancomycin Trough Dose a* 10/05/2017 NOT REPORTED     Vancomycin Trough Date l* 10/05/2017 NOT REPORTED     Vancomycin Trough Time l* 10/05/2017 NOT REPORTED     POC Glucose 10/04/2017 229*    POC Glucose 10/05/2017 165*    POC Glucose 10/05/2017 210*    POC Glucose 10/05/2017 161*    Glucose 10/06/2017 162*    BUN 10/06/2017 24*    CREATININE 10/06/2017 1.17     Bun/Cre Ratio 10/06/2017 NOT REPORTED     Calcium 10/06/2017 8.7     Sodium 10/06/2017 135     Potassium 10/06/2017 4.2     Chloride 10/06/2017 102     CO2 10/06/2017 20     Anion Gap 10/06/2017 13     GFR Non- 10/06/2017 >60     GFR  10/06/2017 >60     GFR Comment 10/06/2017          GFR Staging 10/06/2017 NOT REPORTED     POC Glucose 10/05/2017 184*    POC Glucose 10/06/2017 148*    POC Glucose 10/06/2017 259*       Assessment:       Diagnosis Orders   1. Suspected COVID-19 virus infection  COVID-19 Ambulatory       Plan:      Return if symptoms worsen or fail to improve.     No orders of the defined types were

## 2020-06-12 LAB — SARS-COV-2, NAA: NOT DETECTED

## 2020-06-22 ENCOUNTER — HOSPITAL ENCOUNTER (OUTPATIENT)
Age: 60
Setting detail: SPECIMEN
Discharge: HOME | End: 2020-06-22
Payer: COMMERCIAL

## 2020-06-22 LAB
-: ABNORMAL
ABSOLUTE EOS #: 0.21 K/UL (ref 0–0.44)
ABSOLUTE IMMATURE GRANULOCYTE: 0.11 K/UL (ref 0–0.3)
ABSOLUTE LYMPH #: 2.3 K/UL (ref 1.1–3.7)
ABSOLUTE MONO #: 0.92 K/UL (ref 0.1–1.2)
ALT SERPL-CCNC: 30 U/L (ref 5–41)
AMORPHOUS: ABNORMAL
ANION GAP SERPL CALCULATED.3IONS-SCNC: 16 MMOL/L (ref 9–17)
AST SERPL-CCNC: 23 U/L
BACTERIA: ABNORMAL
BASOPHILS # BLD: 0 % (ref 0–2)
BASOPHILS ABSOLUTE: 0.05 K/UL (ref 0–0.2)
BILIRUBIN URINE: NEGATIVE
BUN BLDV-MCNC: 21 MG/DL (ref 8–23)
BUN/CREAT BLD: ABNORMAL (ref 9–20)
CALCIUM SERPL-MCNC: 9.3 MG/DL (ref 8.6–10.4)
CASTS UA: ABNORMAL /LPF (ref 0–8)
CHLORIDE BLD-SCNC: 97 MMOL/L (ref 98–107)
CHOLESTEROL/HDL RATIO: 4.2
CHOLESTEROL: 131 MG/DL
CO2: 21 MMOL/L (ref 20–31)
COLOR: YELLOW
COMMENT UA: ABNORMAL
CREAT SERPL-MCNC: 1.28 MG/DL (ref 0.7–1.2)
CREATININE URINE: 121.6 MG/DL (ref 39–259)
CRYSTALS, UA: ABNORMAL /HPF
DIFFERENTIAL TYPE: ABNORMAL
EOSINOPHILS RELATIVE PERCENT: 2 % (ref 1–4)
EPITHELIAL CELLS UA: ABNORMAL /HPF
GFR AFRICAN AMERICAN: >60 ML/MIN
GFR NON-AFRICAN AMERICAN: 57 ML/MIN
GFR SERPL CREATININE-BSD FRML MDRD: ABNORMAL ML/MIN/{1.73_M2}
GFR SERPL CREATININE-BSD FRML MDRD: ABNORMAL ML/MIN/{1.73_M2}
GGT: 64 U/L (ref 8–61)
GLUCOSE BLD-MCNC: 289 MG/DL (ref 70–99)
GLUCOSE URINE: ABNORMAL
HCT VFR BLD CALC: 44 % (ref 40.7–50.3)
HDLC SERPL-MCNC: 31 MG/DL
HEMOGLOBIN: 13.4 G/DL (ref 13–17)
IMMATURE GRANULOCYTES: 1 %
KETONES, URINE: NEGATIVE
LDL CHOLESTEROL: 63 MG/DL (ref 0–130)
LEUKOCYTE ESTERASE, URINE: ABNORMAL
LYMPHOCYTES # BLD: 17 % (ref 24–43)
MCH RBC QN AUTO: 29.1 PG (ref 25.2–33.5)
MCHC RBC AUTO-ENTMCNC: 30.5 G/DL (ref 28.4–34.8)
MCV RBC AUTO: 95.4 FL (ref 82.6–102.9)
MICROALBUMIN/CREAT 24H UR: 52 MG/L
MICROALBUMIN/CREAT UR-RTO: 43 MCG/MG CREAT
MONOCYTES # BLD: 7 % (ref 3–12)
MUCUS: ABNORMAL
NITRITE, URINE: NEGATIVE
NRBC AUTOMATED: 0 PER 100 WBC
OTHER OBSERVATIONS UA: ABNORMAL
PDW BLD-RTO: 12.5 % (ref 11.8–14.4)
PH UA: 5 (ref 5–8)
PLATELET # BLD: 247 K/UL (ref 138–453)
PLATELET ESTIMATE: ABNORMAL
PMV BLD AUTO: 11.6 FL (ref 8.1–13.5)
POTASSIUM SERPL-SCNC: 5.4 MMOL/L (ref 3.7–5.3)
PROTEIN UA: ABNORMAL
RBC # BLD: 4.61 M/UL (ref 4.21–5.77)
RBC # BLD: ABNORMAL 10*6/UL
RBC UA: ABNORMAL /HPF (ref 0–4)
RENAL EPITHELIAL, UA: ABNORMAL /HPF
SEG NEUTROPHILS: 73 % (ref 36–65)
SEGMENTED NEUTROPHILS ABSOLUTE COUNT: 9.89 K/UL (ref 1.5–8.1)
SODIUM BLD-SCNC: 134 MMOL/L (ref 135–144)
SPECIFIC GRAVITY UA: 1.03 (ref 1–1.03)
TRICHOMONAS: ABNORMAL
TRIGL SERPL-MCNC: 186 MG/DL
TURBIDITY: ABNORMAL
URIC ACID: 6.9 MG/DL (ref 3.4–7)
URINE HGB: NEGATIVE
UROBILINOGEN, URINE: NORMAL
VLDLC SERPL CALC-MCNC: ABNORMAL MG/DL (ref 1–30)
WBC # BLD: 13.5 K/UL (ref 3.5–11.3)
WBC # BLD: ABNORMAL 10*3/UL
WBC UA: ABNORMAL /HPF (ref 0–5)
YEAST: ABNORMAL

## 2020-06-23 LAB
ESTIMATED AVERAGE GLUCOSE: 424 MG/DL
HBA1C MFR BLD: 16.4 % (ref 4–6)

## 2020-08-10 ENCOUNTER — HOSPITAL ENCOUNTER (EMERGENCY)
Age: 60
Discharge: HOME | End: 2020-08-11
Attending: EMERGENCY MEDICINE
Payer: COMMERCIAL

## 2020-08-10 ENCOUNTER — APPOINTMENT (OUTPATIENT)
Dept: GENERAL RADIOLOGY | Age: 60
End: 2020-08-10
Payer: COMMERCIAL

## 2020-08-10 VITALS
RESPIRATION RATE: 18 BRPM | HEART RATE: 107 BPM | TEMPERATURE: 97.2 F | BODY MASS INDEX: 36.82 KG/M2 | DIASTOLIC BLOOD PRESSURE: 83 MMHG | WEIGHT: 263 LBS | HEIGHT: 71 IN | OXYGEN SATURATION: 98 % | SYSTOLIC BLOOD PRESSURE: 129 MMHG

## 2020-08-10 LAB
-: NORMAL
AMORPHOUS: NORMAL
BACTERIA: NORMAL
BILIRUBIN URINE: NEGATIVE
CASTS UA: NORMAL /LPF (ref 0–8)
COLOR: YELLOW
COMMENT UA: ABNORMAL
CRYSTALS, UA: NORMAL /HPF
EPITHELIAL CELLS UA: NORMAL /HPF (ref 0–5)
GLUCOSE URINE: ABNORMAL
KETONES, URINE: NEGATIVE
LEUKOCYTE ESTERASE, URINE: ABNORMAL
MUCUS: NORMAL
NITRITE, URINE: NEGATIVE
OTHER OBSERVATIONS UA: NORMAL
PH UA: 5 (ref 5–8)
PROTEIN UA: NEGATIVE
RBC UA: NORMAL /HPF (ref 0–4)
RENAL EPITHELIAL, UA: NORMAL /HPF
SPECIFIC GRAVITY UA: 1.01 (ref 1–1.03)
TRICHOMONAS: NORMAL
TURBIDITY: ABNORMAL
URINE HGB: NEGATIVE
UROBILINOGEN, URINE: NORMAL
WBC UA: NORMAL /HPF (ref 0–5)
YEAST: NORMAL

## 2020-08-10 PROCEDURE — 81001 URINALYSIS AUTO W/SCOPE: CPT

## 2020-08-10 PROCEDURE — 73502 X-RAY EXAM HIP UNI 2-3 VIEWS: CPT

## 2020-08-10 PROCEDURE — 99283 EMERGENCY DEPT VISIT LOW MDM: CPT

## 2020-08-10 PROCEDURE — 6370000000 HC RX 637 (ALT 250 FOR IP): Performed by: STUDENT IN AN ORGANIZED HEALTH CARE EDUCATION/TRAINING PROGRAM

## 2020-08-10 PROCEDURE — 87086 URINE CULTURE/COLONY COUNT: CPT

## 2020-08-10 RX ORDER — ACETAMINOPHEN 500 MG
1000 TABLET ORAL ONCE
Status: COMPLETED | OUTPATIENT
Start: 2020-08-10 | End: 2020-08-10

## 2020-08-10 RX ORDER — OXYCODONE HYDROCHLORIDE 5 MG/1
5 TABLET ORAL ONCE
Status: COMPLETED | OUTPATIENT
Start: 2020-08-10 | End: 2020-08-10

## 2020-08-10 RX ADMIN — ACETAMINOPHEN 1000 MG: 500 TABLET ORAL at 21:24

## 2020-08-10 RX ADMIN — OXYCODONE HYDROCHLORIDE 5 MG: 5 TABLET ORAL at 21:24

## 2020-08-10 ASSESSMENT — PAIN DESCRIPTION - FREQUENCY: FREQUENCY: CONTINUOUS

## 2020-08-10 ASSESSMENT — PAIN SCALES - GENERAL
PAINLEVEL_OUTOF10: 9
PAINLEVEL_OUTOF10: 8

## 2020-08-10 ASSESSMENT — PAIN DESCRIPTION - LOCATION: LOCATION: HIP

## 2020-08-10 ASSESSMENT — PAIN DESCRIPTION - DESCRIPTORS: DESCRIPTORS: SHARP

## 2020-08-10 ASSESSMENT — PAIN DESCRIPTION - ORIENTATION: ORIENTATION: RIGHT

## 2020-08-11 LAB
CULTURE: NORMAL
Lab: NORMAL
SPECIMEN DESCRIPTION: NORMAL

## 2020-08-11 PROCEDURE — 6370000000 HC RX 637 (ALT 250 FOR IP): Performed by: EMERGENCY MEDICINE

## 2020-08-11 RX ORDER — ACETAMINOPHEN 500 MG
1000 TABLET ORAL EVERY 6 HOURS PRN
Qty: 40 TABLET | Refills: 0 | Status: SHIPPED | OUTPATIENT
Start: 2020-08-11 | End: 2021-12-21

## 2020-08-11 RX ORDER — LIDOCAINE 4 G/G
1 PATCH TOPICAL DAILY
Qty: 30 PATCH | Refills: 0 | Status: ON HOLD | OUTPATIENT
Start: 2020-08-11 | End: 2020-09-11 | Stop reason: HOSPADM

## 2020-08-11 RX ORDER — LIDOCAINE 4 G/G
1 PATCH TOPICAL DAILY
Status: DISCONTINUED | OUTPATIENT
Start: 2020-08-11 | End: 2020-08-11

## 2020-08-11 RX ORDER — OXYCODONE HYDROCHLORIDE 5 MG/1
5 TABLET ORAL EVERY 6 HOURS PRN
Qty: 10 TABLET | Refills: 0 | Status: SHIPPED | OUTPATIENT
Start: 2020-08-11 | End: 2020-08-14

## 2020-08-11 RX ORDER — CYCLOBENZAPRINE HCL 10 MG
10 TABLET ORAL 3 TIMES DAILY PRN
Qty: 30 TABLET | Refills: 0 | Status: SHIPPED | OUTPATIENT
Start: 2020-08-11 | End: 2020-12-28 | Stop reason: SDUPTHER

## 2020-08-11 RX ORDER — LIDOCAINE 4 G/G
1 PATCH TOPICAL ONCE
Status: DISCONTINUED | OUTPATIENT
Start: 2020-08-11 | End: 2020-08-11 | Stop reason: HOSPADM

## 2020-08-11 NOTE — ED NOTES
Pt ambulatory with slow gait, c/o right hip pain x 1 week, denies injury. Pt states he is prescribed tramadol, takes that and tylenol but has been progressively getting worse. Pt denies injury, states it just started out of nowhere. Pt c/o sharp pain to area.      Shikha Loving RN  08/10/20 2022

## 2020-08-11 NOTE — ED PROVIDER NOTES
[facility] [facility]     Emergency Department     Faculty Attestation    I performed a history and physical examination of the patient and discussed management with the resident. I reviewed the residents note and agree with the documented findings including all diagnostic interpretations and plan of care. Any areas of disagreement are noted on the chart. I was personally present for the key portions of any procedures. I have documented in the chart those procedures where I was not present during the key portions. I have reviewed the emergency nurses triage note. I agree with the chief complaint, past medical history, past surgical history, allergies, medications, social and family history as documented unless otherwise noted below. Documentation of the HPI, Physical Exam and Medical Decision Making performed by scribes is based on my personal performance of the HPI, PE and MDM. For Physician Assistant/ Nurse Practitioner cases/documentation I have personally evaluated this patient and have completed at least one if not all key elements of the E/M (history, physical exam, and MDM). Additional findings are as noted. This patient was evaluated in the Emergency Department for symptoms described in the history of present illness. He/she was evaluated in the context of the global COVID-19 pandemic, which necessitated consideration that the patient might be at risk for infection with the SARS-CoV-2 virus that causes COVID-19. Institutional protocols and algorithms that pertain to the evaluation of patients at risk for COVID-19 are in a state of rapid change based on information released by regulatory bodies including the CDC and federal and state organizations. These policies and algorithms were followed during the patient's care in the ED. Primary Care Physician: Genia Willis MD    History:  This is a 56 y.o. male who presents to the Emergency Department with complaint of hip pain. Right hip. Of note he was recently diagnosed with left renal mass. Denies any hematuria no dysuria no frequency. No numbness or weakness. He reports the pain started in the right buttock and radiates down to the mid thigh. Physical:     height is 5' 11\" (1.803 m) and weight is 263 lb (119.3 kg). His oral temperature is 97.2 °F (36.2 °C). His blood pressure is 129/83 and his pulse is 107. His respiration is 18 and oxygen saturation is 98%.    56 y.o. male uncomfortable but no acute distress, the right hip over the iliac crest shows some mild tenderness to palpation. He does have full range of motion passively but some pain with passive range of motion. Strength is 5 out of 5 in all 4 extremities. Sensation intact compared left to right. Impression: hip pain    Plan: analgesia, XR hip.  Low risk for AAA given recent negative CT w/ contrast.      Clementina Morrow MD, [facility]  Attending Emergency Physician        Odell Brooks MD  08/10/20 [id number]

## 2020-08-11 NOTE — ED NOTES
Pt provided urine cup, ambulated to restroom to try and provide sample.       Shikha Loving RN  08/10/20 0098

## 2020-08-11 NOTE — ED PROVIDER NOTES
Faculty Sign-Out Attestation  Handoff taken on the following patient from prior Attending Physician: Janise Romberg    I was available and discussed any additional care issues that arose and coordinated the management plans with the resident(s) caring for the patient during my duty period. Any areas of disagreement with residents documentation of care or procedures are noted on the chart. I was personally present for the key portions of any/all procedures during my duty period. I have documented in the chart those procedures where I was not present during the keller portions.     Dr Janise Romberg sign out, hip pain, >>> dc    Haylee Reed,   Attending Physician     Haylee Reed,   08/11/20 0107

## 2020-08-13 ASSESSMENT — ENCOUNTER SYMPTOMS
COLOR CHANGE: 0
EYE REDNESS: 0
ABDOMINAL PAIN: 0
SHORTNESS OF BREATH: 0
BACK PAIN: 1
EYE DISCHARGE: 0

## 2020-08-13 NOTE — ED PROVIDER NOTES
on file     Minutes per session: Not on file    Stress: Not on file   Relationships    Social connections     Talks on phone: Not on file     Gets together: Not on file     Attends Jain service: Not on file     Active member of club or organization: Not on file     Attends meetings of clubs or organizations: Not on file     Relationship status: Not on file    Intimate partner violence     Fear of current or ex partner: Not on file     Emotionally abused: Not on file     Physically abused: Not on file     Forced sexual activity: Not on file   Other Topics Concern    Not on file   Social History Narrative    Not on file       Family History   Problem Relation Age of Onset    Stroke Maternal Grandmother     Stroke Maternal Grandfather        Allergies:  Ampicillin and Pcn [penicillins]    Home Medications:  Prior to Admission medications    Medication Sig Start Date End Date Taking? Authorizing Provider   lidocaine 4 % external patch Place 1 patch onto the skin daily 8/11/20 9/10/20 Yes Opal Villagran MD   oxyCODONE (ROXICODONE) 5 MG immediate release tablet Take 1 tablet by mouth every 6 hours as needed for Pain for up to 3 days. Intended supply: 3 days.  Take lowest dose possible to manage pain 8/11/20 8/14/20 Yes Opal Villagran MD   acetaminophen (TYLENOL) 500 MG tablet Take 2 tablets by mouth every 6 hours as needed for Pain 8/11/20 8/21/20 Yes Opal Villagran MD   cyclobenzaprine (FLEXERIL) 10 MG tablet Take 1 tablet by mouth 3 times daily as needed for Muscle spasms 8/11/20  Yes Opal Villagran MD   azithromycin (ZITHROMAX) 250 MG tablet Take 250 mg by mouth daily    Historical Provider, MD   aspirin 81 MG tablet Take 81 mg by mouth daily    Historical Provider, MD   SITagliptin (JANUVIA) 50 MG tablet Take 25 mg by mouth daily     Historical Provider, MD   pravastatin (PRAVACHOL) 20 MG tablet Take 20 mg by mouth daily    Historical Provider, MD   metoprolol (LOPRESSOR) 50 MG tablet Take 50 mg by mouth 2 times daily    Historical Provider, MD   lisinopril (PRINIVIL;ZESTRIL) 20 MG tablet Take 20 mg by mouth daily    Historical Provider, MD   allopurinol (ZYLOPRIM) 100 MG tablet Take 100 mg by mouth daily    Historical Provider, MD   zolpidem (AMBIEN) 10 MG tablet Take by mouth nightly as needed for Sleep    Historical Provider, MD   hydrALAZINE (APRESOLINE) 25 MG tablet Take 25 mg by mouth 2 times daily     Historical Provider, MD   furosemide (LASIX) 40 MG tablet Take 40 mg by mouth daily     Historical Provider, MD       REVIEW OF SYSTEMS    (2-9 systems for level 4, 10 or more for level 5)      Review of Systems   Constitutional: Negative for chills and fever. Eyes: Negative for discharge and redness. Respiratory: Negative for shortness of breath. Cardiovascular: Negative for chest pain. Gastrointestinal: Negative for abdominal pain. Genitourinary: Negative for dysuria. Musculoskeletal: Positive for back pain. Negative for arthralgias. Skin: Negative for color change and rash. Neurological: Negative for headaches. Psychiatric/Behavioral: Negative for agitation and confusion. PHYSICAL EXAM   (up to 7 for level 4, 8 or more for level 5)     INITIAL VITALS:    height is 5' 11\" (1.803 m) and weight is 263 lb (119.3 kg). His oral temperature is 97.2 °F (36.2 °C). His blood pressure is 129/83 and his pulse is 107. His respiration is 18 and oxygen saturation is 98%. Physical Exam  Vitals signs and nursing note reviewed. Constitutional:       Appearance: He is well-developed. HENT:      Head: Normocephalic and atraumatic. Nose: Nose normal.      Mouth/Throat:      Mouth: Mucous membranes are moist.   Eyes:      General: No scleral icterus. Conjunctiva/sclera: Conjunctivae normal.      Pupils: Pupils are equal, round, and reactive to light. Cardiovascular:      Rate and Rhythm: Normal rate and regular rhythm. Heart sounds: Normal heart sounds.  No murmur. No friction rub. No gallop. Pulmonary:      Effort: Pulmonary effort is normal. No respiratory distress. Breath sounds: Normal breath sounds. No wheezing or rales. Abdominal:      Palpations: Abdomen is soft. Tenderness: There is no abdominal tenderness. Musculoskeletal: Normal range of motion. Comments: Tender to paraspinal palpation. No rashes visualized. Pulses 2/4 distally. PMS intact distally. Skin:     General: Skin is warm and dry. Findings: No erythema or rash. Neurological:      Mental Status: He is alert and oriented to person, place, and time. Comments: no aphasia, no dysarthria, EOMI, PERRLA; 5/5 strength in upper and lower extremities b/l   Psychiatric:         Behavior: Behavior normal.         DIFFERENTIAL  DIAGNOSIS     PLAN (LABS / IMAGING / EKG):  Orders Placed This Encounter   Procedures    Culture, Urine    XR HIP 2-3 VW W PELVIS RIGHT    Urinalysis Reflex to Culture    Microscopic Urinalysis       MEDICATIONS ORDERED:  Orders Placed This Encounter   Medications    oxyCODONE (ROXICODONE) immediate release tablet 5 mg    acetaminophen (TYLENOL) tablet 1,000 mg    DISCONTD: lidocaine 4 % external patch 1 patch    DISCONTD: lidocaine 4 % external patch 1 patch    lidocaine 4 % external patch     Sig: Place 1 patch onto the skin daily     Dispense:  30 patch     Refill:  0    oxyCODONE (ROXICODONE) 5 MG immediate release tablet     Sig: Take 1 tablet by mouth every 6 hours as needed for Pain for up to 3 days. Intended supply: 3 days.  Take lowest dose possible to manage pain     Dispense:  10 tablet     Refill:  0    acetaminophen (TYLENOL) 500 MG tablet     Sig: Take 2 tablets by mouth every 6 hours as needed for Pain     Dispense:  40 tablet     Refill:  0    cyclobenzaprine (FLEXERIL) 10 MG tablet     Sig: Take 1 tablet by mouth 3 times daily as needed for Muscle spasms     Dispense:  30 tablet     Refill:  0       DDX: Musculoskeletal back addressed. · Suspicion for any acute life-threatening processes is low. Patient voices understanding of plan. PROCEDURES:  None    CONSULTS:  None    CRITICAL CARE:  Please see attending note    FINAL IMPRESSION      1. Right hip pain          DISPOSITION / PLAN     DISPOSITION Decision To Discharge 08/11/2020 01:07:55 AM        PATIENTREFERRED TO:  Alice Louis MD  [address] [place] [address]  [phone]    Schedule an appointment as soon as possible for a visit in 3 days      [facility]  [address]    If symptoms worsen      DISCHARGE MEDICATIONS:  Discharge Medication List as of 8/11/2020  1:11 AM      START taking these medications    Details   lidocaine 4 % external patch Place 1 patch onto the skin daily, Transdermal, DAILY Starting Tue 8/11/2020, Until Thu 9/10/2020, For 30 days, Disp-30 patch,R-0, Print      oxyCODONE (ROXICODONE) 5 MG immediate release tablet Take 1 tablet by mouth every 6 hours as needed for Pain for up to 3 days. Intended supply: 3 days.  Take lowest dose possible to manage pain, Disp-10 tablet,R-0Print      acetaminophen (TYLENOL) 500 MG tablet Take 2 tablets by mouth every 6 hours as needed for Pain, Disp-40 tablet,R-0Print             Agustin Chicas DO  EmergencyMedicine Resident    (Please note that portions of this note were completed with a voice recognition program.  Efforts were made to edit the dictations but occasionally words are mis-transcribed.)       Agustin Chicas DO  Resident  08/13/20 0063

## 2020-08-21 ENCOUNTER — HOSPITAL ENCOUNTER (OUTPATIENT)
Dept: CT IMAGING | Age: 60
Discharge: HOME | End: 2020-08-23
Payer: COMMERCIAL

## 2020-08-21 PROCEDURE — 73700 CT LOWER EXTREMITY W/O DYE: CPT

## 2020-08-31 ENCOUNTER — HOSPITAL ENCOUNTER (INPATIENT)
Age: 60
LOS: 1 days | Discharge: ANOTHER HOSPITAL | DRG: 657 | End: 2020-09-01
Attending: EMERGENCY MEDICINE | Admitting: FAMILY MEDICINE
Payer: COMMERCIAL

## 2020-08-31 ENCOUNTER — APPOINTMENT (OUTPATIENT)
Dept: GENERAL RADIOLOGY | Age: 60
DRG: 657 | End: 2020-08-31
Payer: COMMERCIAL

## 2020-08-31 ENCOUNTER — APPOINTMENT (OUTPATIENT)
Dept: CT IMAGING | Age: 60
DRG: 657 | End: 2020-08-31
Payer: COMMERCIAL

## 2020-08-31 PROBLEM — K56.609 BOWEL OBSTRUCTION (HCC): Status: ACTIVE | Noted: 2020-08-31

## 2020-08-31 LAB
ABSOLUTE EOS #: <0.03 K/UL (ref 0–0.44)
ABSOLUTE EOS #: <0.03 K/UL (ref 0–0.44)
ABSOLUTE IMMATURE GRANULOCYTE: 0.17 K/UL (ref 0–0.3)
ABSOLUTE IMMATURE GRANULOCYTE: 0.2 K/UL (ref 0–0.3)
ABSOLUTE LYMPH #: 1.57 K/UL (ref 1.1–3.7)
ABSOLUTE LYMPH #: 1.71 K/UL (ref 1.1–3.7)
ABSOLUTE MONO #: 0.95 K/UL (ref 0.1–1.2)
ABSOLUTE MONO #: 1.11 K/UL (ref 0.1–1.2)
ALBUMIN SERPL-MCNC: 3.6 G/DL (ref 3.5–5.2)
ALBUMIN/GLOBULIN RATIO: ABNORMAL (ref 1–2.5)
ALP BLD-CCNC: 104 U/L (ref 40–129)
ALT SERPL-CCNC: 23 U/L (ref 5–41)
AMYLASE: 64 U/L (ref 28–100)
ANION GAP SERPL CALCULATED.3IONS-SCNC: 18 MMOL/L (ref 9–17)
ANION GAP SERPL CALCULATED.3IONS-SCNC: 25 MMOL/L (ref 9–17)
AST SERPL-CCNC: 21 U/L
BASOPHILS # BLD: 0 % (ref 0–2)
BASOPHILS # BLD: 0 % (ref 0–2)
BASOPHILS ABSOLUTE: 0.05 K/UL (ref 0–0.2)
BASOPHILS ABSOLUTE: 0.06 K/UL (ref 0–0.2)
BETA-HYDROXYBUTYRATE: 2.18 MMOL/L (ref 0.02–0.27)
BILIRUB SERPL-MCNC: 0.5 MG/DL (ref 0.3–1.2)
BILIRUBIN DIRECT: 0.14 MG/DL
BILIRUBIN, INDIRECT: 0.36 MG/DL (ref 0–1)
BUN BLDV-MCNC: 34 MG/DL (ref 8–23)
BUN BLDV-MCNC: 35 MG/DL (ref 8–23)
BUN/CREAT BLD: 19 (ref 9–20)
BUN/CREAT BLD: 22 (ref 9–20)
CALCIUM SERPL-MCNC: 10 MG/DL (ref 8.6–10.4)
CALCIUM SERPL-MCNC: 10.6 MG/DL (ref 8.6–10.4)
CHLORIDE BLD-SCNC: 85 MMOL/L (ref 98–107)
CHLORIDE BLD-SCNC: 94 MMOL/L (ref 98–107)
CO2: 21 MMOL/L (ref 20–31)
CO2: 24 MMOL/L (ref 20–31)
CREAT SERPL-MCNC: 1.59 MG/DL (ref 0.7–1.2)
CREAT SERPL-MCNC: 1.76 MG/DL (ref 0.7–1.2)
DIFFERENTIAL TYPE: ABNORMAL
DIFFERENTIAL TYPE: ABNORMAL
EOSINOPHILS RELATIVE PERCENT: 0 % (ref 1–4)
EOSINOPHILS RELATIVE PERCENT: 0 % (ref 1–4)
FIO2: ABNORMAL
GFR AFRICAN AMERICAN: 48 ML/MIN
GFR AFRICAN AMERICAN: 54 ML/MIN
GFR NON-AFRICAN AMERICAN: 39 ML/MIN
GFR NON-AFRICAN AMERICAN: 44 ML/MIN
GFR SERPL CREATININE-BSD FRML MDRD: ABNORMAL ML/MIN/{1.73_M2}
GLOBULIN: ABNORMAL G/DL (ref 1.5–3.8)
GLUCOSE BLD-MCNC: 304 MG/DL (ref 70–99)
GLUCOSE BLD-MCNC: 355 MG/DL (ref 75–110)
GLUCOSE BLD-MCNC: 416 MG/DL (ref 70–99)
HCO3 VENOUS: 23.4 MMOL/L (ref 24–30)
HCT VFR BLD CALC: 35.2 % (ref 40.7–50.3)
HCT VFR BLD CALC: 39.3 % (ref 40.7–50.3)
HEMOGLOBIN: 10.9 G/DL (ref 13–17)
HEMOGLOBIN: 12 G/DL (ref 13–17)
IMMATURE GRANULOCYTES: 1 %
IMMATURE GRANULOCYTES: 1 %
LACTIC ACID, SEPSIS WHOLE BLOOD: ABNORMAL MMOL/L (ref 0.5–1.9)
LACTIC ACID, SEPSIS WHOLE BLOOD: NORMAL MMOL/L (ref 0.5–1.9)
LACTIC ACID, SEPSIS: 1.9 MMOL/L (ref 0.5–1.9)
LACTIC ACID, SEPSIS: 3.2 MMOL/L (ref 0.5–1.9)
LIPASE: 96 U/L (ref 13–60)
LYMPHOCYTES # BLD: 10 % (ref 24–43)
LYMPHOCYTES # BLD: 8 % (ref 24–43)
MAGNESIUM: 1.8 MG/DL (ref 1.6–2.6)
MCH RBC QN AUTO: 26.7 PG (ref 25.2–33.5)
MCH RBC QN AUTO: 26.8 PG (ref 25.2–33.5)
MCHC RBC AUTO-ENTMCNC: 30.5 G/DL (ref 28.4–34.8)
MCHC RBC AUTO-ENTMCNC: 31 G/DL (ref 28.4–34.8)
MCV RBC AUTO: 86.1 FL (ref 82.6–102.9)
MCV RBC AUTO: 87.9 FL (ref 82.6–102.9)
MONOCYTES # BLD: 5 % (ref 3–12)
MONOCYTES # BLD: 7 % (ref 3–12)
NEGATIVE BASE EXCESS, VEN: ABNORMAL (ref 0–2)
NRBC AUTOMATED: 0 PER 100 WBC
NRBC AUTOMATED: 0 PER 100 WBC
O2 DEVICE/FLOW/%: ABNORMAL
O2 SAT, VEN: 53 %
PATIENT TEMP: ABNORMAL
PCO2, VEN: 32 MM HG (ref 39–55)
PDW BLD-RTO: 12.6 % (ref 11.8–14.4)
PDW BLD-RTO: 12.6 % (ref 11.8–14.4)
PH VENOUS: 7.47 (ref 7.32–7.42)
PHOSPHORUS: 1.5 MG/DL (ref 2.5–4.5)
PLATELET # BLD: 427 K/UL (ref 138–453)
PLATELET # BLD: 470 K/UL (ref 138–453)
PLATELET ESTIMATE: ABNORMAL
PLATELET ESTIMATE: ABNORMAL
PMV BLD AUTO: 10.3 FL (ref 8.1–13.5)
PMV BLD AUTO: 11.5 FL (ref 8.1–13.5)
PO2, VEN: 26 MM HG (ref 30–50)
POC PCO2 TEMP: ABNORMAL MM HG
POC PH TEMP: ABNORMAL
POC PO2 TEMP: ABNORMAL MM HG
POSITIVE BASE EXCESS, VEN: 0 (ref 0–2)
POTASSIUM SERPL-SCNC: 4.6 MMOL/L (ref 3.7–5.3)
POTASSIUM SERPL-SCNC: 5.2 MMOL/L (ref 3.7–5.3)
RBC # BLD: 4.09 M/UL (ref 4.21–5.77)
RBC # BLD: 4.47 M/UL (ref 4.21–5.77)
RBC # BLD: ABNORMAL 10*6/UL
RBC # BLD: ABNORMAL 10*6/UL
SEG NEUTROPHILS: 82 % (ref 36–65)
SEG NEUTROPHILS: 86 % (ref 36–65)
SEGMENTED NEUTROPHILS ABSOLUTE COUNT: 13.56 K/UL (ref 1.5–8.1)
SEGMENTED NEUTROPHILS ABSOLUTE COUNT: 17.16 K/UL (ref 1.5–8.1)
SODIUM BLD-SCNC: 131 MMOL/L (ref 135–144)
SODIUM BLD-SCNC: 136 MMOL/L (ref 135–144)
TOTAL CO2, VENOUS: 24 MMOL/L (ref 25–31)
TOTAL PROTEIN: 9.3 G/DL (ref 6.4–8.3)
WBC # BLD: 16.6 K/UL (ref 3.5–11.3)
WBC # BLD: 20 K/UL (ref 3.5–11.3)
WBC # BLD: ABNORMAL 10*3/UL
WBC # BLD: ABNORMAL 10*3/UL

## 2020-08-31 PROCEDURE — 2500000003 HC RX 250 WO HCPCS: Performed by: NURSE PRACTITIONER

## 2020-08-31 PROCEDURE — 74176 CT ABD & PELVIS W/O CONTRAST: CPT

## 2020-08-31 PROCEDURE — 2580000003 HC RX 258: Performed by: EMERGENCY MEDICINE

## 2020-08-31 PROCEDURE — 82947 ASSAY GLUCOSE BLOOD QUANT: CPT

## 2020-08-31 PROCEDURE — 96365 THER/PROPH/DIAG IV INF INIT: CPT

## 2020-08-31 PROCEDURE — 82010 KETONE BODYS QUAN: CPT

## 2020-08-31 PROCEDURE — 83735 ASSAY OF MAGNESIUM: CPT

## 2020-08-31 PROCEDURE — 83605 ASSAY OF LACTIC ACID: CPT

## 2020-08-31 PROCEDURE — 82150 ASSAY OF AMYLASE: CPT

## 2020-08-31 PROCEDURE — 2500000003 HC RX 250 WO HCPCS: Performed by: FAMILY MEDICINE

## 2020-08-31 PROCEDURE — 82805 BLOOD GASES W/O2 SATURATION: CPT

## 2020-08-31 PROCEDURE — 6360000002 HC RX W HCPCS: Performed by: NURSE PRACTITIONER

## 2020-08-31 PROCEDURE — 36415 COLL VENOUS BLD VENIPUNCTURE: CPT

## 2020-08-31 PROCEDURE — 87086 URINE CULTURE/COLONY COUNT: CPT

## 2020-08-31 PROCEDURE — 84100 ASSAY OF PHOSPHORUS: CPT

## 2020-08-31 PROCEDURE — 6370000000 HC RX 637 (ALT 250 FOR IP): Performed by: NURSE PRACTITIONER

## 2020-08-31 PROCEDURE — 82803 BLOOD GASES ANY COMBINATION: CPT

## 2020-08-31 PROCEDURE — 99291 CRITICAL CARE FIRST HOUR: CPT

## 2020-08-31 PROCEDURE — 2580000003 HC RX 258: Performed by: NURSE PRACTITIONER

## 2020-08-31 PROCEDURE — 87040 BLOOD CULTURE FOR BACTERIA: CPT

## 2020-08-31 PROCEDURE — 6370000000 HC RX 637 (ALT 250 FOR IP): Performed by: EMERGENCY MEDICINE

## 2020-08-31 PROCEDURE — 80048 BASIC METABOLIC PNL TOTAL CA: CPT

## 2020-08-31 PROCEDURE — 2580000003 HC RX 258: Performed by: FAMILY MEDICINE

## 2020-08-31 PROCEDURE — 96361 HYDRATE IV INFUSION ADD-ON: CPT

## 2020-08-31 PROCEDURE — 96375 TX/PRO/DX INJ NEW DRUG ADDON: CPT

## 2020-08-31 PROCEDURE — 85025 COMPLETE CBC W/AUTO DIFF WBC: CPT

## 2020-08-31 PROCEDURE — 83690 ASSAY OF LIPASE: CPT

## 2020-08-31 PROCEDURE — 81001 URINALYSIS AUTO W/SCOPE: CPT

## 2020-08-31 PROCEDURE — 80076 HEPATIC FUNCTION PANEL: CPT

## 2020-08-31 PROCEDURE — 6360000002 HC RX W HCPCS: Performed by: EMERGENCY MEDICINE

## 2020-08-31 PROCEDURE — 2000000000 HC ICU R&B

## 2020-08-31 PROCEDURE — 93005 ELECTROCARDIOGRAM TRACING: CPT | Performed by: NURSE PRACTITIONER

## 2020-08-31 PROCEDURE — 74022 RADEX COMPL AQT ABD SERIES: CPT

## 2020-08-31 PROCEDURE — 6360000002 HC RX W HCPCS: Performed by: FAMILY MEDICINE

## 2020-08-31 RX ORDER — MAGNESIUM SULFATE 1 G/100ML
1 INJECTION INTRAVENOUS PRN
Status: DISCONTINUED | OUTPATIENT
Start: 2020-08-31 | End: 2020-09-01 | Stop reason: HOSPADM

## 2020-08-31 RX ORDER — 0.9 % SODIUM CHLORIDE 0.9 %
1000 INTRAVENOUS SOLUTION INTRAVENOUS ONCE
Status: COMPLETED | OUTPATIENT
Start: 2020-08-31 | End: 2020-08-31

## 2020-08-31 RX ORDER — ACETAMINOPHEN 325 MG/1
650 TABLET ORAL EVERY 6 HOURS PRN
Status: DISCONTINUED | OUTPATIENT
Start: 2020-08-31 | End: 2020-09-01 | Stop reason: HOSPADM

## 2020-08-31 RX ORDER — LIDOCAINE 4 G/G
1 PATCH TOPICAL DAILY PRN
Status: DISCONTINUED | OUTPATIENT
Start: 2020-08-31 | End: 2020-09-01 | Stop reason: HOSPADM

## 2020-08-31 RX ORDER — ONDANSETRON 2 MG/ML
4 INJECTION INTRAMUSCULAR; INTRAVENOUS EVERY 6 HOURS PRN
Status: DISCONTINUED | OUTPATIENT
Start: 2020-08-31 | End: 2020-09-01 | Stop reason: HOSPADM

## 2020-08-31 RX ORDER — ACETAMINOPHEN 650 MG/1
650 SUPPOSITORY RECTAL EVERY 6 HOURS PRN
Status: DISCONTINUED | OUTPATIENT
Start: 2020-08-31 | End: 2020-09-01 | Stop reason: HOSPADM

## 2020-08-31 RX ORDER — SODIUM CHLORIDE 0.9 % (FLUSH) 0.9 %
10 SYRINGE (ML) INJECTION PRN
Status: DISCONTINUED | OUTPATIENT
Start: 2020-08-31 | End: 2020-09-01 | Stop reason: HOSPADM

## 2020-08-31 RX ORDER — ASPIRIN 81 MG/1
81 TABLET ORAL NIGHTLY
Status: DISCONTINUED | OUTPATIENT
Start: 2020-08-31 | End: 2020-09-01 | Stop reason: HOSPADM

## 2020-08-31 RX ORDER — SODIUM CHLORIDE 9 MG/ML
INJECTION, SOLUTION INTRAVENOUS CONTINUOUS
Status: DISCONTINUED | OUTPATIENT
Start: 2020-08-31 | End: 2020-08-31

## 2020-08-31 RX ORDER — SODIUM CHLORIDE 0.9 % (FLUSH) 0.9 %
10 SYRINGE (ML) INJECTION EVERY 12 HOURS SCHEDULED
Status: DISCONTINUED | OUTPATIENT
Start: 2020-08-31 | End: 2020-09-01 | Stop reason: HOSPADM

## 2020-08-31 RX ORDER — DEXTROSE AND SODIUM CHLORIDE 5; .45 G/100ML; G/100ML
INJECTION, SOLUTION INTRAVENOUS CONTINUOUS PRN
Status: DISCONTINUED | OUTPATIENT
Start: 2020-08-31 | End: 2020-09-01

## 2020-08-31 RX ORDER — ASCORBIC ACID 500 MG
500 TABLET ORAL DAILY
Status: DISCONTINUED | OUTPATIENT
Start: 2020-09-01 | End: 2020-09-01 | Stop reason: HOSPADM

## 2020-08-31 RX ORDER — PROMETHAZINE HYDROCHLORIDE 25 MG/ML
12.5 INJECTION, SOLUTION INTRAMUSCULAR; INTRAVENOUS ONCE
Status: COMPLETED | OUTPATIENT
Start: 2020-08-31 | End: 2020-08-31

## 2020-08-31 RX ORDER — DEXTROSE MONOHYDRATE 25 G/50ML
12.5 INJECTION, SOLUTION INTRAVENOUS PRN
Status: DISCONTINUED | OUTPATIENT
Start: 2020-08-31 | End: 2020-09-01 | Stop reason: HOSPADM

## 2020-08-31 RX ORDER — ONDANSETRON 2 MG/ML
4 INJECTION INTRAMUSCULAR; INTRAVENOUS EVERY 8 HOURS PRN
Status: DISCONTINUED | OUTPATIENT
Start: 2020-08-31 | End: 2020-08-31

## 2020-08-31 RX ORDER — POTASSIUM CHLORIDE 7.45 MG/ML
10 INJECTION INTRAVENOUS PRN
Status: DISCONTINUED | OUTPATIENT
Start: 2020-08-31 | End: 2020-09-01 | Stop reason: HOSPADM

## 2020-08-31 RX ORDER — TRAMADOL HYDROCHLORIDE 50 MG/1
100 TABLET ORAL EVERY 6 HOURS PRN
COMMUNITY
End: 2020-11-02 | Stop reason: SDUPTHER

## 2020-08-31 RX ORDER — ONDANSETRON 2 MG/ML
4 INJECTION INTRAMUSCULAR; INTRAVENOUS ONCE
Status: COMPLETED | OUTPATIENT
Start: 2020-08-31 | End: 2020-08-31

## 2020-08-31 RX ORDER — PHENOL 1.4 %
10 AEROSOL, SPRAY (ML) MUCOUS MEMBRANE NIGHTLY PRN
Status: ON HOLD | COMMUNITY
End: 2020-10-02 | Stop reason: HOSPADM

## 2020-08-31 RX ORDER — ACETAMINOPHEN 325 MG/1
650 TABLET ORAL EVERY 4 HOURS PRN
Status: DISCONTINUED | OUTPATIENT
Start: 2020-08-31 | End: 2020-08-31

## 2020-08-31 RX ORDER — FUROSEMIDE 40 MG/1
40 TABLET ORAL DAILY PRN
Status: DISCONTINUED | OUTPATIENT
Start: 2020-08-31 | End: 2020-09-01 | Stop reason: HOSPADM

## 2020-08-31 RX ORDER — LANOLIN ALCOHOL/MO/W.PET/CERES
9 CREAM (GRAM) TOPICAL NIGHTLY PRN
Status: DISCONTINUED | OUTPATIENT
Start: 2020-08-31 | End: 2020-09-01 | Stop reason: HOSPADM

## 2020-08-31 RX ORDER — PRAVASTATIN SODIUM 20 MG
20 TABLET ORAL NIGHTLY
Status: DISCONTINUED | OUTPATIENT
Start: 2020-08-31 | End: 2020-09-01 | Stop reason: HOSPADM

## 2020-08-31 RX ORDER — POLYETHYLENE GLYCOL 3350 17 G/17G
17 POWDER, FOR SOLUTION ORAL DAILY PRN
Status: DISCONTINUED | OUTPATIENT
Start: 2020-08-31 | End: 2020-09-01 | Stop reason: HOSPADM

## 2020-08-31 RX ORDER — POTASSIUM CHLORIDE 7.45 MG/ML
10 INJECTION INTRAVENOUS PRN
Status: DISCONTINUED | OUTPATIENT
Start: 2020-08-31 | End: 2020-08-31

## 2020-08-31 RX ORDER — POTASSIUM CHLORIDE 7.45 MG/ML
10 INJECTION INTRAVENOUS
Status: DISPENSED | OUTPATIENT
Start: 2020-08-31 | End: 2020-08-31

## 2020-08-31 RX ORDER — ASCORBIC ACID 500 MG
500 TABLET ORAL DAILY
COMMUNITY

## 2020-08-31 RX ORDER — METOPROLOL TARTRATE 5 MG/5ML
5 INJECTION INTRAVENOUS EVERY 6 HOURS PRN
Status: DISCONTINUED | OUTPATIENT
Start: 2020-08-31 | End: 2020-09-01

## 2020-08-31 RX ORDER — ALLOPURINOL 100 MG/1
100 TABLET ORAL DAILY
Status: DISCONTINUED | OUTPATIENT
Start: 2020-09-01 | End: 2020-09-01 | Stop reason: HOSPADM

## 2020-08-31 RX ORDER — METOPROLOL TARTRATE 50 MG/1
50 TABLET, FILM COATED ORAL 2 TIMES DAILY
Status: DISCONTINUED | OUTPATIENT
Start: 2020-08-31 | End: 2020-09-01 | Stop reason: HOSPADM

## 2020-08-31 RX ORDER — HYDROMORPHONE HYDROCHLORIDE 1 MG/ML
1 INJECTION, SOLUTION INTRAMUSCULAR; INTRAVENOUS; SUBCUTANEOUS ONCE
Status: COMPLETED | OUTPATIENT
Start: 2020-08-31 | End: 2020-08-31

## 2020-08-31 RX ORDER — 0.9 % SODIUM CHLORIDE 0.9 %
1260 INTRAVENOUS SOLUTION INTRAVENOUS ONCE
Status: COMPLETED | OUTPATIENT
Start: 2020-08-31 | End: 2020-08-31

## 2020-08-31 RX ORDER — LIDOCAINE HYDROCHLORIDE 20 MG/ML
5 JELLY TOPICAL PRN
Status: DISCONTINUED | OUTPATIENT
Start: 2020-08-31 | End: 2020-09-01 | Stop reason: HOSPADM

## 2020-08-31 RX ORDER — LISINOPRIL 20 MG/1
20 TABLET ORAL DAILY
Status: DISCONTINUED | OUTPATIENT
Start: 2020-09-01 | End: 2020-09-01 | Stop reason: HOSPADM

## 2020-08-31 RX ORDER — PROMETHAZINE HYDROCHLORIDE 12.5 MG/1
12.5 TABLET ORAL EVERY 6 HOURS PRN
Status: DISCONTINUED | OUTPATIENT
Start: 2020-08-31 | End: 2020-09-01 | Stop reason: HOSPADM

## 2020-08-31 RX ORDER — CYCLOBENZAPRINE HCL 10 MG
10 TABLET ORAL 2 TIMES DAILY PRN
Status: DISCONTINUED | OUTPATIENT
Start: 2020-08-31 | End: 2020-09-01 | Stop reason: HOSPADM

## 2020-08-31 RX ORDER — ALOGLIPTIN 6.25 MG/1
6.25 TABLET, FILM COATED ORAL DAILY
Status: DISCONTINUED | OUTPATIENT
Start: 2020-09-01 | End: 2020-09-01 | Stop reason: HOSPADM

## 2020-08-31 RX ORDER — ZOLPIDEM TARTRATE 5 MG/1
5 TABLET ORAL NIGHTLY PRN
Status: DISCONTINUED | OUTPATIENT
Start: 2020-08-31 | End: 2020-09-01 | Stop reason: HOSPADM

## 2020-08-31 RX ORDER — HYDRALAZINE HYDROCHLORIDE 25 MG/1
25 TABLET, FILM COATED ORAL 3 TIMES DAILY
Status: DISCONTINUED | OUTPATIENT
Start: 2020-08-31 | End: 2020-09-01 | Stop reason: HOSPADM

## 2020-08-31 RX ADMIN — SODIUM CHLORIDE 1260 ML: 9 INJECTION, SOLUTION INTRAVENOUS at 14:56

## 2020-08-31 RX ADMIN — SODIUM CHLORIDE 1 UNITS/HR: 9 INJECTION, SOLUTION INTRAVENOUS at 16:06

## 2020-08-31 RX ADMIN — POTASSIUM CHLORIDE 10 MEQ: 7.46 INJECTION, SOLUTION INTRAVENOUS at 16:09

## 2020-08-31 RX ADMIN — METRONIDAZOLE 500 MG: 500 INJECTION, SOLUTION INTRAVENOUS at 17:12

## 2020-08-31 RX ADMIN — POTASSIUM CHLORIDE 10 MEQ: 7.46 INJECTION, SOLUTION INTRAVENOUS at 23:29

## 2020-08-31 RX ADMIN — ONDANSETRON 4 MG: 2 INJECTION INTRAMUSCULAR; INTRAVENOUS at 13:59

## 2020-08-31 RX ADMIN — POTASSIUM CHLORIDE 10 MEQ: 7.46 INJECTION, SOLUTION INTRAVENOUS at 18:34

## 2020-08-31 RX ADMIN — HYDROMORPHONE HYDROCHLORIDE 1 MG: 1 INJECTION, SOLUTION INTRAMUSCULAR; INTRAVENOUS; SUBCUTANEOUS at 15:00

## 2020-08-31 RX ADMIN — SODIUM PHOSPHATE, MONOBASIC, MONOHYDRATE 15 MMOL: 276; 142 INJECTION, SOLUTION INTRAVENOUS at 22:19

## 2020-08-31 RX ADMIN — DEXTROSE AND SODIUM CHLORIDE: 5; 450 INJECTION, SOLUTION INTRAVENOUS at 22:20

## 2020-08-31 RX ADMIN — CEFEPIME HYDROCHLORIDE 2 G: 2 INJECTION, POWDER, FOR SOLUTION INTRAVENOUS at 15:48

## 2020-08-31 RX ADMIN — SODIUM CHLORIDE 1000 ML: 9 INJECTION, SOLUTION INTRAVENOUS at 13:59

## 2020-08-31 RX ADMIN — PROMETHAZINE HYDROCHLORIDE 12.5 MG: 25 INJECTION INTRAMUSCULAR; INTRAVENOUS at 14:59

## 2020-08-31 RX ADMIN — POTASSIUM CHLORIDE 10 MEQ: 7.46 INJECTION, SOLUTION INTRAVENOUS at 22:12

## 2020-08-31 RX ADMIN — METOPROLOL TARTRATE 5 MG: 5 INJECTION INTRAVENOUS at 22:19

## 2020-08-31 RX ADMIN — SODIUM CHLORIDE: 9 INJECTION, SOLUTION INTRAVENOUS at 20:56

## 2020-08-31 RX ADMIN — LIDOCAINE HYDROCHLORIDE 5 ML: 20 JELLY TOPICAL at 17:20

## 2020-08-31 ASSESSMENT — PAIN DESCRIPTION - LOCATION: LOCATION: ABDOMEN

## 2020-08-31 ASSESSMENT — ENCOUNTER SYMPTOMS
VOMITING: 1
ABDOMINAL PAIN: 1
SINUS PRESSURE: 0
COLOR CHANGE: 0
SHORTNESS OF BREATH: 0
DIARRHEA: 0
RHINORRHEA: 0
CONSTIPATION: 0
SORE THROAT: 0
NAUSEA: 1
WHEEZING: 0
COUGH: 0

## 2020-08-31 ASSESSMENT — PAIN SCALES - GENERAL
PAINLEVEL_OUTOF10: 7
PAINLEVEL_OUTOF10: 6
PAINLEVEL_OUTOF10: 0

## 2020-08-31 ASSESSMENT — PAIN DESCRIPTION - PAIN TYPE: TYPE: ACUTE PAIN

## 2020-08-31 NOTE — PROGRESS NOTES
Patient weight is between 101-149kg. For prophylaxis with Enoxaparin, Pharmacy adjusted the dose to account for the patient's increased body weight in accordance with hospital approved protocol. The dose has been changed to 30mg BID. Please contact pharmacy with any concerns @ [phone]. Thank you.    Julia Records  8/31/2020 7:27 PM

## 2020-08-31 NOTE — H&P
Family Medicine History and Physical      Name: Tilton Libman  MRN: [id number]     Acct: [de-identified]  Room: [facility]/    Admit Date: 8/31/2020  PCP: Jocelyn Kapoor MD    Chief Complaint:     Chief Complaint   Patient presents with    Emesis    Nausea    Abdominal Pain       History Obtained From:     patient    History of Present Illness:      Tilton Libman is a  56 y.o.  male who presents with Emesis; Nausea; and Abdominal Pain    This is a pleasant 5th decade gentleman admitted via er with intractable vomiting  He has had this for one day  And presented to the er for treatment   he was found to have bowel obstruction on ct abdomen with  The previously known renal mass obstructing the small bowel   Post ng tube the vomiting has stopped and the abdominal pain relieved   he had previously been scheduled for surgery to remove the renal tumor suspected to be malignant on sept 18    Past Medical History:     Past Medical History:   Diagnosis Date    Back pain     Cellulitis     Diabetes mellitus ([facility] [address]     Gout     History of kidney cancer 08/10/2020    Recent diagnosis    Hypertension         Past Surgical History:     Past Surgical History:   Procedure Laterality Date    ANKLE SURGERY      CARPAL TUNNEL RELEASE Bilateral     KNEE SURGERY Bilateral         Medications Prior to Admission:       Prior to Admission medications    Medication Sig Start Date End Date Taking? Authorizing Provider   traMADol (ULTRAM) 50 MG tablet Take 100 mg by mouth every 6 hours as needed for Pain.    Yes Historical Provider, MD   Melatonin 10 MG TABS Take 10 mg by mouth nightly as needed (SLEEP)   Yes Historical Provider, MD   vitamin C (ASCORBIC ACID) 500 MG tablet Take 500 mg by mouth daily   Yes Historical Provider, MD   lidocaine 4 % external patch Place 1 patch onto the skin daily  Patient taking differently: Place 1 patch onto the skin daily as needed (TO HIP)  8/11/20 9/10/20  Norm Wolf MD acetaminophen (TYLENOL) 500 MG tablet Take 2 tablets by mouth every 6 hours as needed for Pain 8/11/20 8/21/20  Justin Randle MD   cyclobenzaprine (FLEXERIL) 10 MG tablet Take 1 tablet by mouth 3 times daily as needed for Muscle spasms  Patient taking differently: Take 10 mg by mouth 2 times daily as needed for Muscle spasms  8/11/20   Justin Randle MD   aspirin 81 MG tablet Take 81 mg by mouth nightly     Historical Provider, MD   SITagliptin (JANUVIA) 50 MG tablet Take 25 mg by mouth daily     Historical Provider, MD   pravastatin (PRAVACHOL) 20 MG tablet Take 20 mg by mouth daily    Historical Provider, MD   metoprolol (LOPRESSOR) 50 MG tablet Take 50 mg by mouth 2 times daily    Historical Provider, MD   lisinopril (PRINIVIL;ZESTRIL) 20 MG tablet Take 20 mg by mouth daily    Historical Provider, MD   allopurinol (ZYLOPRIM) 100 MG tablet Take 100 mg by mouth daily    Historical Provider, MD   zolpidem (AMBIEN) 10 MG tablet Take by mouth nightly as needed for Sleep    Historical Provider, MD   hydrALAZINE (APRESOLINE) 25 MG tablet Take 25 mg by mouth 3 times daily     Historical Provider, MD   furosemide (LASIX) 40 MG tablet Take 40 mg by mouth daily as needed (LEG SWELLING)     Historical Provider, MD        Allergies:       Ampicillin; Pcn [penicillins]; and Tape [adhesive tape]    Social History:     Tobacco:    reports that he has never smoked. He has never used smokeless tobacco.  Alcohol:      reports current alcohol use. Drug Use:  reports no history of drug use.     Family History:     Family History   Problem Relation Age of Onset    Stroke Maternal Grandmother     Stroke Maternal Grandfather        Review of Systems:     Positive and Negative as described in HPI    Constitutional:  negative for  fevers, chills, sweats, fatigue, and weight loss  HEENT:  negative for vision or hearing changes,   Respiratory:  negative for shortness of breath, cough, or congestion  Cardiovascular:  negative for  chest pain, palpitations  Gastrointestinal: see [place]  Genitourinary: see [place]  Integument/Breast:  negative for rash, skin lesions  Musculoskeletal:  negative for muscle aches or joint pain  Neurological:  negative for headaches, dizziness, lightheadedness, numbness, pain and tingling extrimities  Behavior/Psych:  negative for depression and anxiety    Code Status:  Prior    Physical Exam:     Vitals:  /83   Pulse 120   Temp 98.2 °F (36.8 °C) (Oral)   Resp 16   Ht 5' 11\" (1.803 m)   Wt 255 lb (115.7 kg)   SpO2 100%   BMI 35.57 kg/m²   Temp (24hrs), Av.2 °F (36.8 °C), Min:98.2 °F (36.8 °C), Max:98.2 °F (36.8 °C)      General appearance - alert, well appearing, and in no acute distress  Mental status - oriented to person, place, and time with normal affect  Head - normocephalic and atraumatic  Eyes - pupils equal and reactive, extraocular eye movements intact, conjunctiva clear  Ears - hearing appears to be intact  Nose - no drainage noted  Mouth - mucous membranes moist  Neck - supple, no carotid bruits, thyroid not palpable  Chest - clear to auscultation and percussion  normal effort  Heart - normal rate, regular rhythm, no murmurs  Abdomen - soft,tender, distended, bowel sounds present all four quadrants, no masses, hepatomegaly or splenomegaly  Neurological - normal speech, no focal findings or movement disorder noted, cranial nerves II through XII grossly intact  Extremities - peripheral pulses palpable, no pedal edema or calf pain with palpation  Skin - no gross lesions, rashes, or induration noted        Data:     Daily Labs for 3 Days:    Hematology:  Recent Labs     20  1354   WBC 20.0*   HGB 12.0*   HCT 39.3*        Chemistry:  Recent Labs     20  1354   *   K 5.2   CL 85*   CO2 21   GLUCOSE 416*   BUN 34*   CREATININE 1.76*   CALCIUM 10.6*     Recent Labs     20  1354   PROT 9.3*   LABALBU 3.6   AST 21   ALT 23   ALKPHOS 104   BILITOT 0.50   BILIDIR 0.14 AMYLASE 64   LIPASE 96*         Assesment:     Primary Problem  <principal problem not specified>    Active Hospital Problems    Diagnosis Date Noted    Bowel obstruction ([facility] [address] [S76.727] 08/31/2020   dehydration   juliana on cki  dm2  Renal mass      Plan:     1. Admit  2. iv fluids  3. Ng tube  4.  consult surgery   5. Consult nephrology  6. Npo  7. He will be transferred to [facility] when bed available  8.        Electronically signed by Preeti Hayes MD on 8/31/2020 at 6:28 PM     Copy sent to Dr. Preeti Hayes MD

## 2020-08-31 NOTE — PROGRESS NOTES
Transitions of Care Pharmacy Service   Medication Review    The patient's list of current home medications has been reviewed and updated. Source(s) of information: Patient/ [facility] ([phone])    Please feel free to call with any questions about this encounter. Thank you. Jacqui Israel, [facility]  Transitions of Care Pharmacy Service  Phone:  [phone]  Fax: [phone]             Prior to Admission medications    Medication Sig Start Date End Date Taking? Authorizing Provider   traMADol (ULTRAM) 50 MG tablet Take 100 mg by mouth every 6 hours as needed for Pain.    Yes Historical Provider, MD   Melatonin 10 MG TABS Take 10 mg by mouth nightly as needed (SLEEP)   Yes Historical Provider, MD   vitamin C (ASCORBIC ACID) 500 MG tablet Take 500 mg by mouth daily   Yes Historical Provider, MD   lidocaine 4 % external patch Place 1 patch onto the skin daily  Patient taking differently: Place 1 patch onto the skin daily as needed (TO HIP)  8/11/20 9/10/20  Garett Che MD   acetaminophen (TYLENOL) 500 MG tablet Take 2 tablets by mouth every 6 hours as needed for Pain 8/11/20 8/21/20  Garett Che MD   cyclobenzaprine (FLEXERIL) 10 MG tablet Take 1 tablet by mouth 3 times daily as needed for Muscle spasms  Patient taking differently: Take 10 mg by mouth 2 times daily as needed for Muscle spasms  8/11/20   Garett Che MD   azithromycin (ZITHROMAX) 250 MG tablet Take 250 mg by mouth daily  8/31/20  Historical Provider, MD   aspirin 81 MG tablet Take 81 mg by mouth nightly     Historical Provider, MD   SITagliptin (JANUVIA) 50 MG tablet Take 25 mg by mouth daily     Historical Provider, MD   pravastatin (PRAVACHOL) 20 MG tablet Take 20 mg by mouth daily    Historical Provider, MD   metoprolol (LOPRESSOR) 50 MG tablet Take 50 mg by mouth 2 times daily    Historical Provider, MD   lisinopril (PRINIVIL;ZESTRIL) 20 MG tablet Take 20 mg by mouth daily    Historical Provider, MD   allopurinol (ZYLOPRIM) 100 MG tablet Take 100 mg by mouth daily    Historical Provider, MD   zolpidem (AMBIEN) 10 MG tablet Take by mouth nightly as needed for Sleep    Historical Provider, MD   hydrALAZINE (APRESOLINE) 25 MG tablet Take 25 mg by mouth 3 times daily     Historical Provider, MD   furosemide (LASIX) 40 MG tablet Take 40 mg by mouth daily as needed (LEG SWELLING)     Historical Provider, MD

## 2020-08-31 NOTE — ED PROVIDER NOTES
[address] [place] [address] ED  eMERGENCY dEPARTMENT eNCOUnter      Pt Name: Efrain Hobbs  MRN: [id number]  [place] 1960  Date of evaluation: 8/31/2020  Provider: Catracho Hurtado NP, TORREY - [address]       Chief Complaint   Patient presents with    Emesis    Nausea    Abdominal Pain         HISTORY OF PRESENT ILLNESS  (Location/Symptom, Timing/Onset, Context/Setting, Quality, Duration, Modifying Factors, Severity.)   Efrain Hobbs is a 56 y.o. male who presents to the emergency department by private vehicle for evaluation of abdominal pain with nausea and vomiting. Patient states that last night she developed he developed a generalized abdominal pain with nausea and vomiting. He states that he is vomited multiple times. He states he cannot keep anything down. He has a generalized abdominal pain. He had some looser stool today. He does report that he has a history of a mass on his left kidney that he is going to have removed by Dr. Angel Langston at [facility] on 18 September. He denies any other history of any GI or  problems in the past.      Nursing Notes were reviewed.     ALLERGIES     Ampicillin; Pcn [penicillins]; and Tape [adhesive tape]    CURRENT MEDICATIONS       Previous Medications    ACETAMINOPHEN (TYLENOL) 500 MG TABLET    Take 2 tablets by mouth every 6 hours as needed for Pain    ALLOPURINOL (ZYLOPRIM) 100 MG TABLET    Take 100 mg by mouth daily    ASPIRIN 81 MG TABLET    Take 81 mg by mouth nightly     CYCLOBENZAPRINE (FLEXERIL) 10 MG TABLET    Take 1 tablet by mouth 3 times daily as needed for Muscle spasms    FUROSEMIDE (LASIX) 40 MG TABLET    Take 40 mg by mouth daily as needed (LEG SWELLING)     HYDRALAZINE (APRESOLINE) 25 MG TABLET    Take 25 mg by mouth 3 times daily     LIDOCAINE 4 % EXTERNAL PATCH    Place 1 patch onto the skin daily    LISINOPRIL (PRINIVIL;ZESTRIL) 20 MG TABLET    Take 20 mg by mouth daily    MELATONIN 10 MG TABS    Take 10 mg by mouth nightly as needed (SLEEP)    METOPROLOL (LOPRESSOR) 50 MG TABLET    Take 50 mg by mouth 2 times daily    PRAVASTATIN (PRAVACHOL) 20 MG TABLET    Take 20 mg by mouth daily    SITAGLIPTIN (JANUVIA) 50 MG TABLET    Take 25 mg by mouth daily     TRAMADOL (ULTRAM) 50 MG TABLET    Take 100 mg by mouth every 6 hours as needed for Pain. VITAMIN C (ASCORBIC ACID) 500 MG TABLET    Take 500 mg by mouth daily    ZOLPIDEM (AMBIEN) 10 MG TABLET    Take by mouth nightly as needed for Sleep       PAST MEDICAL HISTORY         Diagnosis Date    Back pain     Cellulitis     Diabetes mellitus ([address]     Gout     History of kidney cancer 08/10/2020    Recent diagnosis    Hypertension        SURGICAL HISTORY           Procedure Laterality Date    ANKLE SURGERY      CARPAL TUNNEL RELEASE Bilateral     KNEE SURGERY Bilateral          FAMILY HISTORY           Problem Relation Age of Onset    Stroke Maternal Grandmother     Stroke Maternal Grandfather      Family Status   Relation Name Status    MGM  (Not Specified)    MGF  (Not Specified)        SOCIAL HISTORY      reports that he has never smoked. He has never used smokeless tobacco. He reports current alcohol use. He reports that he does not use drugs. REVIEW OF SYSTEMS    (2-9 systems for level 4, 10 or more for level 5)     Review of Systems   Constitutional: Negative for chills, fever and unexpected weight change. HENT: Negative for congestion, rhinorrhea, sinus pressure and sore throat. Respiratory: Negative for cough, shortness of breath and wheezing. Cardiovascular: Negative for chest pain and palpitations. Gastrointestinal: Positive for abdominal pain, nausea and vomiting. Negative for constipation and diarrhea. Genitourinary: Negative for dysuria and hematuria. Musculoskeletal: Negative for arthralgias and myalgias. Skin: Negative for color change and rash. Neurological: Negative for dizziness, weakness and headaches.    Hematological: Negative for adenopathy. Except as noted above the remainder of the review of systems was reviewed and negative. PHYSICAL EXAM    (up to 7 for level 4, 8 or more for level 5)     ED Triage Vitals [08/31/20 1349]   BP Temp Temp Source Pulse Resp SpO2 Height Weight   132/83 98.2 °F (36.8 °C) Oral 144 24 100 % 5' 11\" (1.803 m) 255 lb (115.7 kg)       Physical Exam  Vitals signs reviewed. Constitutional:       Appearance: He is well-developed. HENT:      Head: Normocephalic and atraumatic. Eyes:      Conjunctiva/sclera: Conjunctivae normal.      Pupils: Pupils are equal, round, and reactive to light. Neck:      Musculoskeletal: Normal range of motion and neck supple. Cardiovascular:      Rate and Rhythm: Normal rate and regular rhythm. Pulmonary:      Effort: Pulmonary effort is normal. No respiratory distress. Breath sounds: Normal breath sounds. No stridor. Abdominal:      General: Bowel sounds are normal.      Palpations: Abdomen is soft. Tenderness: There is generalized abdominal tenderness. Musculoskeletal: Normal range of motion. Lymphadenopathy:      Cervical: No cervical adenopathy. Skin:     General: Skin is warm and dry. Findings: No rash. Neurological:      Mental Status: He is alert and oriented to person, place, and time. DIAGNOSTIC RESULTS     RADIOLOGY:   Non-plain film images such as CT, Ultrasound and MRI are read by the radiologist. Matt Mckeon radiographic images are visualized and preliminarily interpreted by the emergency physician with the below findings:    Ct Abdomen Pelvis Wo Contrast    Result Date: 8/31/2020  EXAMINATION: CT OF THE ABDOMEN AND PELVIS WITHOUT CONTRAST 8/31/2020 3:20 pm TECHNIQUE: CT of the abdomen and pelvis was performed without the administration of intravenous contrast. Multiplanar reformatted images are provided for review.  Dose modulation, iterative reconstruction, and/or weight based adjustment of the mA/kV was utilized to reduce the radiation dose to as low as reasonably achievable. COMPARISON: 8/21/2020 HISTORY: ORDERING SYSTEM PROVIDED HISTORY: Abdominal Pain TECHNOLOGIST PROVIDED HISTORY: Abdominal Pain Reason for Exam: Pt states vomiting since last night. Pt states known left renal mass to be removed in 2 days. Acuity: Acute Type of Exam: Initial FINDINGS: Lower Chest: No acute infiltrate at the lung bases. Organs: The unenhanced liver, spleen, pancreas and adrenal glands are unremarkable. No acute biliary findings. The right kidney is unremarkable. There is an infiltrative mass in the lower pole of the left kidney, previously partially visualized on CT of the right hip. This measures approximately 4.8 x 6.9 x 9.9 cm. The mass infiltrates the mesentery and results in proximal small bowel obstruction. There is a small middle pole left renal cyst.  Nonobstructive lower pole calculus measuring 6 x 12 mm. GI/Bowel: Marked gastric and duodenal distention. There is proximal small-bowel obstruction due to the infiltrative lower pole left renal mass. The distal small bowel is nondistended. No pericolonic inflammatory changes. Pelvis: Trace free pelvic fluid. No pelvic mass is identified. The prostate is not enlarged. Partial distention of the urinary bladder. Peritoneum/Retroperitoneum: The abdominal aorta is normal in caliber. 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 No pathologic retroperitoneal adenopathy. Bones/Soft Tissues: No acute osseous or soft tissue abnormality. No lytic or blastic osseous lesions. Multilevel degenerative changes in the lower thoracic and lumbar spine. 1. Proximal small-bowel obstruction due to extension of an infiltrative lower pole left renal mass to the mesentery and adjacent small bowel. There is marked gastric distension. 2. Infiltrative lower pole left renal mass, presumed malignant measuring 4.8 x 6.9 x 9.9 cm. Nonobstructive left nephrolithiasis.  3. Trace free pelvic fluid. Findings were discussed with Modesta Fontana at 3:37 pm on 8/31/2020. Xr Acute Abd Series Chest 1 Vw    Result Date: 8/31/2020  EXAMINATION: TWO XRAY VIEWS OF THE ABDOMEN AND SINGLE  XRAY VIEW OF THE CHEST 8/31/2020 2:25 pm COMPARISON: None. HISTORY: ORDERING SYSTEM PROVIDED HISTORY: Pain TECHNOLOGIST PROVIDED HISTORY: Pain Reason for Exam: pt states vomiting and abdomen pain x 2 days Acuity: Acute Type of Exam: Initial FINDINGS: The cardiomediastinal silhouette is unremarkable. The lungs are clear. No infiltrate, pleural fluid or focal process is seen. Moderate aortic tortuosity, particularly involving the ascending aorta. 5 x 11 mm calcification on the left at the L3 vertebral level, possibly within the left renal pelvis or proximal left ureter. No other plain film evidence of renal or ureteral calculi. Bowel gas pattern is nonspecific. No focally distended loops, air-fluid levels or free air. Mild gastric distension. Multilevel osteoarthritic spurring in the lumbar spine. No acute cardiopulmonary disease. 5 x 11 mm calcification, possibly a renal or proximal ureteral calculus. Nonspecific bowel-gas pattern. Ct Hip Right Wo Contrast    Result Date: 8/21/2020  EXAMINATION: CT OF THE RIGHT HIP WITHOUT CONTRAST 8/21/2020 2:03 pm TECHNIQUE: CT of the right hip was performed without the administration of intravenous contrast.  Multiplanar reformatted images are provided for review. Dose modulation, iterative reconstruction, and/or weight based adjustment of the mA/kV was utilized to reduce the radiation dose to as low as reasonably achievable. COMPARISON: Radiographs dated 08/10/2020 HISTORY ORDERING SYSTEM PROVIDED HISTORY: Pain of right hip joint FINDINGS: Bones: There is no acute fracture or dislocation. No suspicious osteolytic lesions. Soft Tissue:  Muscle attenuation is within normal limits. No evidence of soft tissue fluid collection or gas.  On the superior most images acquired through the pelvis, there is a soft tissue density in the mid left abdomen, which is indeterminate. This could simply be volume averaging at the inferior tip of the left kidney, but other mass lesions are not excluded. There is a 0.3 x 0.5 cm calculus in the distal right ureter. Joint:  No evidence of joint effusion at the hips. There is mild joint space narrowing and marginal spurring at the right hip. Scattered mild subchondral cystic changes noted in the femoral head. No articular surface collapse. Small ossification is seen adjacent to the superior acetabulum, likely a labral ossification. There are moderate to severe degenerative changes at the sacroiliac joints, worse on the right. Severe facet arthropathy is seen in the visualized lower lumbar spine. 1.  No acute osseous abnormality in the right hip. 2.  Mild degenerative changes at the right hip. Prominent degenerative changes also noted in the sacroiliac joints and visualized lower lumbar spine. 3.  A 0.3 x 0.5 cm calculus is seen at the distal right ureter. Right kidney is not included in the field of view to assess for hydronephrosis. CT imaging of the abdomen and pelvis is recommended. 4.  Indeterminate soft tissue density in the mid left abdomen, which is incompletely visualized. This could simply be volume averaging at the inferior aspect of the left kidney, but other mass lesions are not excluded. This can also be evaluated with CT imaging of the abdomen and pelvis. Xr Hip 2-3 Vw W Pelvis Right    Result Date: 8/10/2020  EXAMINATION: ONE XRAY VIEW OF THE PELVIS AND TWO XRAY VIEWS RIGHT HIP 8/10/2020 9:30 pm COMPARISON: None.  HISTORY: ORDERING SYSTEM PROVIDED HISTORY: Continued right hip pain, knee osteoarthritis, kidney cancer history, rule out bony mets TECHNOLOGIST PROVIDED HISTORY: Continued right hip pain, knee osteoarthritis, kidney cancer history, rule out bony mets Reason for Exam: rt hip pain for 1 week progressively getting worse no known injury FINDINGS: There is no fracture, effusion or dislocation. The femoral head is well seated within the acetabulum. No bony lesions identified. Soft tissues are normal and the visualized bony ring of the pelvis is normal.     Normal x-ray of the hip     Interpretation per the Radiologist below, if available at the time of this note:    CT ABDOMEN PELVIS WO CONTRAST   Preliminary Result   1. Proximal small-bowel obstruction due to extension of an infiltrative lower   pole left renal mass to the mesentery and adjacent small bowel. There is   marked gastric distension. 2. Infiltrative lower pole left renal mass, presumed malignant measuring 4.8   x 6.9 x 9.9 cm. Nonobstructive left nephrolithiasis. 3. Trace free pelvic fluid. Findings were discussed with Shaylee Adam at 3:37 pm on 8/31/2020. XR ACUTE ABD SERIES CHEST 1 VW   Final Result   No acute cardiopulmonary disease. 5 x 11 mm calcification, possibly a renal or proximal ureteral calculus. Nonspecific bowel-gas pattern. LABS:  Labs Reviewed   CBC WITH AUTO DIFFERENTIAL - Abnormal; Notable for the following components:       Result Value    WBC 20.0 (*)     Hemoglobin 12.0 (*)     Hematocrit 39.3 (*)     Seg Neutrophils 86 (*)     Lymphocytes 8 (*)     Eosinophils % 0 (*)     Immature Granulocytes 1 (*)     Segs Absolute 17.16 (*)     All other components within normal limits   BASIC METABOLIC PANEL - Abnormal; Notable for the following components:    Glucose 416 (*)     BUN 34 (*)     CREATININE 1.76 (*)     Calcium 10.6 (*)     Sodium 131 (*)     Chloride 85 (*)     Anion Gap 25 (*)     GFR Non- 39 (*)     GFR  48 (*)     All other components within normal limits   LIPASE - Abnormal; Notable for the following components:    Lipase 96 (*)     All other components within normal limits   HEPATIC FUNCTION PANEL - Abnormal; Notable for the following components:     Total Protein 9.3 (*)     All other components within normal limits   LACTATE, SEPSIS - Abnormal; Notable for the following components:    Lactic Acid, Sepsis 3.2 (*)     All other components within normal limits   BETA-HYDROXYBUTYRATE - Abnormal; Notable for the following components:    Beta-Hydroxybutyrate 2.18 (*)     All other components within normal limits   POC PANEL (G3)-TORRIE - Abnormal; Notable for the following components:    pH, Torrie 7.47 (*)     pCO2, Torrie 32 (*)     pO2, Torrie 26 (*)     Total CO2, Venous 24 (*)     HCO3, Venous 23.4 (*)     All other components within normal limits   POC GLUCOSE FINGERSTICK - Abnormal; Notable for the following components:    POC Glucose 355 (*)     All other components within normal limits   CULTURE, BLOOD 1   CULTURE, BLOOD 1   AMYLASE   LACTATE, SEPSIS   URINE RT REFLEX TO CULTURE   BLOOD GAS, VENOUS       All other labs were within normal range or not returned as of this dictation. EMERGENCY DEPARTMENT COURSE and DIFFERENTIAL DIAGNOSIS/MDM:   Vitals:    Vitals:    08/31/20 1349 08/31/20 1411 08/31/20 1503 08/31/20 1824   BP: 132/83   109/83   Pulse: 144 123 126 120   Resp: 24 11 19 16   Temp: 98.2 °F (36.8 °C)      TempSrc: Oral      SpO2: 100%      Weight: 255 lb (115.7 kg)      Height: 5' 11\" (1.803 m)          Medical Decision Making: A code sepsis was called at 1538pm. The patient had blood cultures drawn and placed on IV cefepime and flagyl. I spoke with Dr Salvatore Barreto regarding this patient. I also spoke with Dr Gagandeep Ballard who is on call for Dr Kellie Ramirez and He would like the patient transferred to [facility] but there are no beds available for transfers so the patient will stay here at [facility] overnight. We did make sure the wife and patient were updated on plan of care. NG tube placed and the patient had over 2.4Liters out. We did discuss this case  With Dr Jacinta Vela per the request of Dr Gagandeep Ballard. Dr Salvatore Barreto was at bedside to see the patient.        A repeat sepsis focused exam has been completed 6:28 PM EDT. NG    CONSULTS:  IP CONSULT TO UROLOGY  IP CONSULT TO NEPHROLOGY    CRITICAL CARE TIME     Due to the high probability of sudden and clinically significant deterioration in the patient's condition he required highest level of my preparedness to intervene urgently. I provided critical care time including documentation time, medication orders and management, reevaluation, vital sign assessment, ordering and reviewing of of lab tests ordering and reviewing of x-ray studies, and admission orders. Aggregate critical care time is 42 minutes including only time during which I was engaged in work directly related to his care and did not include time spent treating other patients simultaneously.     FINAL IMPRESSION    Bowel obstruction    DISPOSITION/PLAN   DISPOSITION Admitted 08/31/2020 04:20:17 PM      PATIENT REFERRED TO:   Shola Hudson, [address] [address] [phone]            DISCHARGE MEDICATIONS:     New Prescriptions    No medications on file           (Please note that portions of this note were completed with a voice recognition program.  Efforts were made to edit the dictations but occasionally words are mis-transcribed.)    [id number] NP, APRN - CNP  Certified Nurse Practitioner          TORREY Hernandez - CNP  08/31/20 Jessica Garcia 59, TORREY - CNP  08/31/20 Jessica Garcia 59, TORREY - CNP  08/31/20 [id number]

## 2020-08-31 NOTE — ED PROVIDER NOTES
EMERGENCY DEPARTMENT ENCOUNTER   ATTENDING ATTESTATION     Pt Name: Soledad Alejandra  MRN: [id number]  [place] 1960  Date of evaluation: 8/31/20   Mady Pantoja II is a 56 y.o. male with CC: Emesis; Nausea; and Abdominal Pain    MDM:     56-year-old male coming into the emergency room for nausea vomiting and abdominal pain. This case was seen by the advanced nurse practitioner and myself. Please see her notes for further detail and history and physical exam.  Patient noted to have a small bowel obstruction due to extension of a renal mass to the mesentery. Dr. Hortencia Sharma with urology and Dr. Bunny Libman with general surgery were consulted for further management on this case. Plan is for patient to go to the OR to address obstruction related to renal mass. Patient suspected to have possible sepsis in addition to the above due to elevated white count and lactic acid although some of this may be reactive in nature. Patient started on broad-spectrum antibiotics here in the emergency room and blood cultures were drawn. CRITICAL CARE:       EKG: All EKG's are interpreted by the Emergency Department Physician who either signs or Co-signs this chart in the absence of a cardiologist.   sinus tachycardia     RADIOLOGY:All plain film, CT, MRI, and formal ultrasound images (except ED bedside ultrasound) are read by the radiologist, see reports below, unless otherwise noted in MDM or here. CT ABDOMEN PELVIS WO CONTRAST   Preliminary Result   1. Proximal small-bowel obstruction due to extension of an infiltrative lower   pole left renal mass to the mesentery and adjacent small bowel. There is   marked gastric distension. 2. Infiltrative lower pole left renal mass, presumed malignant measuring 4.8   x 6.9 x 9.9 cm. Nonobstructive left nephrolithiasis. 3. Trace free pelvic fluid. Findings were discussed with Danuta Robins at 3:37 pm on 8/31/2020.          XR ACUTE ABD SERIES CHEST 1 VW   Final Result   No acute cardiopulmonary disease. 5 x 11 mm calcification, possibly a renal or proximal ureteral calculus. Nonspecific bowel-gas pattern. LABS: All lab results were reviewed by myself, and all abnormals are listed below. Labs Reviewed   CBC WITH AUTO DIFFERENTIAL - Abnormal; Notable for the following components:       Result Value    WBC 20.0 (*)     Hemoglobin 12.0 (*)     Hematocrit 39.3 (*)     Seg Neutrophils 86 (*)     Lymphocytes 8 (*)     Eosinophils % 0 (*)     Immature Granulocytes 1 (*)     Segs Absolute 17.16 (*)     All other components within normal limits   BASIC METABOLIC PANEL - Abnormal; Notable for the following components:    Glucose 416 (*)     BUN 34 (*)     CREATININE 1.76 (*)     Calcium 10.6 (*)     Sodium 131 (*)     Chloride 85 (*)     Anion Gap 25 (*)     GFR Non- 39 (*)     GFR  48 (*)     All other components within normal limits   LIPASE - Abnormal; Notable for the following components:    Lipase 96 (*)     All other components within normal limits   HEPATIC FUNCTION PANEL - Abnormal; Notable for the following components:     Total Protein 9.3 (*)     All other components within normal limits   LACTATE, SEPSIS - Abnormal; Notable for the following components:    Lactic Acid, Sepsis 3.2 (*)     All other components within normal limits   BETA-HYDROXYBUTYRATE - Abnormal; Notable for the following components:    Beta-Hydroxybutyrate 2.18 (*)     All other components within normal limits   POC PANEL (G3)-TORRIE - Abnormal; Notable for the following components:    pH, Torrie 7.47 (*)     pCO2, Torrie 32 (*)     pO2, Torrie 26 (*)     Total CO2, Venous 24 (*)     HCO3, Venous 23.4 (*)     All other components within normal limits   POC GLUCOSE FINGERSTICK - Abnormal; Notable for the following components:    POC Glucose 355 (*)     All other components within normal limits   CULTURE, BLOOD 1   CULTURE, BLOOD 1   AMYLASE   LACTATE, SEPSIS   URINE RT REFLEX TO CULTURE BLOOD GAS, VENOUS     CONSULTS:  IP CONSULT TO UROLOGY  IP CONSULT TO NEPHROLOGY  FINAL IMPRESSION      1. Intestinal obstruction, unspecified cause, unspecified whether partial or complete ([facility]    2. Renal mass    3. Hyperglycemia    4. DEBBI (acute kidney injury) ([facility] [address]            PASTMEDICAL HISTORY     Past Medical History:   Diagnosis Date    Back pain     Cellulitis     Diabetes mellitus ([facility] [facility]     Gout     History of kidney cancer 08/10/2020    Recent diagnosis    Hypertension      SURGICAL HISTORY       Past Surgical History:   Procedure Laterality Date    ANKLE SURGERY      CARPAL TUNNEL RELEASE Bilateral     KNEE SURGERY Bilateral      CURRENT MEDICATIONS       Previous Medications    ACETAMINOPHEN (TYLENOL) 500 MG TABLET    Take 2 tablets by mouth every 6 hours as needed for Pain    ALLOPURINOL (ZYLOPRIM) 100 MG TABLET    Take 100 mg by mouth daily    ASPIRIN 81 MG TABLET    Take 81 mg by mouth nightly     CYCLOBENZAPRINE (FLEXERIL) 10 MG TABLET    Take 1 tablet by mouth 3 times daily as needed for Muscle spasms    FUROSEMIDE (LASIX) 40 MG TABLET    Take 40 mg by mouth daily as needed (LEG SWELLING)     HYDRALAZINE (APRESOLINE) 25 MG TABLET    Take 25 mg by mouth 3 times daily     LIDOCAINE 4 % EXTERNAL PATCH    Place 1 patch onto the skin daily    LISINOPRIL (PRINIVIL;ZESTRIL) 20 MG TABLET    Take 20 mg by mouth daily    MELATONIN 10 MG TABS    Take 10 mg by mouth nightly as needed (SLEEP)    METOPROLOL (LOPRESSOR) 50 MG TABLET    Take 50 mg by mouth 2 times daily    PRAVASTATIN (PRAVACHOL) 20 MG TABLET    Take 20 mg by mouth daily    SITAGLIPTIN (JANUVIA) 50 MG TABLET    Take 25 mg by mouth daily     TRAMADOL (ULTRAM) 50 MG TABLET    Take 100 mg by mouth every 6 hours as needed for Pain.     VITAMIN C (ASCORBIC ACID) 500 MG TABLET    Take 500 mg by mouth daily    ZOLPIDEM (AMBIEN) 10 MG TABLET    Take by mouth nightly as needed for Sleep     ALLERGIES     is allergic to ampicillin; pcn [penicillins]; and tape [adhesive tape]. FAMILY HISTORY     He indicated that the status of his maternal grandmother is unknown. He indicated that the status of his maternal grandfather is unknown. SOCIAL HISTORY       Social History     Tobacco Use    Smoking status: Never Smoker    Smokeless tobacco: Never Used   Substance Use Topics    Alcohol use: Yes     Alcohol/week: 0.0 standard drinks     Comment: once a week    Drug use: No       I personally evaluated and examined the patient in conjunction with the APC and agree with the assessment, treatment plan, and disposition of the patient as recorded by the APC.    Yolanda Huddleston MD  Attending Emergency Physician       Sunitha Strong MD  09/10/20 2480

## 2020-09-01 ENCOUNTER — APPOINTMENT (OUTPATIENT)
Dept: GENERAL RADIOLOGY | Age: 60
DRG: 657 | End: 2020-09-01
Payer: COMMERCIAL

## 2020-09-01 ENCOUNTER — ANESTHESIA EVENT (OUTPATIENT)
Dept: OPERATING ROOM | Age: 60
DRG: 657 | End: 2020-09-01
Payer: COMMERCIAL

## 2020-09-01 ENCOUNTER — HOSPITAL ENCOUNTER (INPATIENT)
Age: 60
LOS: 10 days | Discharge: HOME HEALTH | DRG: 853 | End: 2020-09-11
Attending: INTERNAL MEDICINE
Payer: COMMERCIAL

## 2020-09-01 ENCOUNTER — ANESTHESIA (OUTPATIENT)
Dept: OPERATING ROOM | Age: 60
DRG: 657 | End: 2020-09-01
Payer: COMMERCIAL

## 2020-09-01 VITALS
BODY MASS INDEX: 35.7 KG/M2 | HEART RATE: 123 BPM | HEIGHT: 71 IN | TEMPERATURE: 98.1 F | DIASTOLIC BLOOD PRESSURE: 87 MMHG | RESPIRATION RATE: 17 BRPM | SYSTOLIC BLOOD PRESSURE: 112 MMHG | OXYGEN SATURATION: 95 % | WEIGHT: 255 LBS

## 2020-09-01 VITALS — OXYGEN SATURATION: 95 % | DIASTOLIC BLOOD PRESSURE: 81 MMHG | SYSTOLIC BLOOD PRESSURE: 144 MMHG

## 2020-09-01 PROBLEM — K56.609 SMALL BOWEL OBSTRUCTION (HCC): Status: ACTIVE | Noted: 2020-09-01

## 2020-09-01 LAB
-: ABNORMAL
ABSOLUTE EOS #: <0.03 K/UL (ref 0–0.44)
ABSOLUTE IMMATURE GRANULOCYTE: 0.24 K/UL (ref 0–0.3)
ABSOLUTE LYMPH #: 1.83 K/UL (ref 1.1–3.7)
ABSOLUTE MONO #: 1.54 K/UL (ref 0.1–1.2)
AMORPHOUS: ABNORMAL
ANION GAP SERPL CALCULATED.3IONS-SCNC: 14 MMOL/L (ref 9–17)
ANION GAP SERPL CALCULATED.3IONS-SCNC: 16 MMOL/L (ref 9–17)
ANION GAP SERPL CALCULATED.3IONS-SCNC: 17 MMOL/L (ref 9–17)
ANION GAP SERPL CALCULATED.3IONS-SCNC: 20 MMOL/L (ref 9–17)
BACTERIA: ABNORMAL
BASOPHILS # BLD: 0 % (ref 0–2)
BASOPHILS ABSOLUTE: 0.07 K/UL (ref 0–0.2)
BILIRUBIN URINE: NEGATIVE
BUN BLDV-MCNC: 34 MG/DL (ref 8–23)
BUN BLDV-MCNC: 34 MG/DL (ref 8–23)
BUN BLDV-MCNC: 36 MG/DL (ref 8–23)
BUN BLDV-MCNC: 37 MG/DL (ref 8–23)
BUN/CREAT BLD: 20 (ref 9–20)
BUN/CREAT BLD: 21 (ref 9–20)
CALCIUM SERPL-MCNC: 10.2 MG/DL (ref 8.6–10.4)
CALCIUM SERPL-MCNC: 9.2 MG/DL (ref 8.6–10.4)
CALCIUM SERPL-MCNC: 9.5 MG/DL (ref 8.6–10.4)
CALCIUM SERPL-MCNC: 9.7 MG/DL (ref 8.6–10.4)
CALCIUM SERPL-MCNC: 9.9 MG/DL (ref 8.6–10.4)
CALCIUM SERPL-MCNC: 9.9 MG/DL (ref 8.6–10.4)
CASTS UA: ABNORMAL /LPF
CHLORIDE BLD-SCNC: 93 MMOL/L (ref 98–107)
CHLORIDE BLD-SCNC: 94 MMOL/L (ref 98–107)
CHLORIDE BLD-SCNC: 94 MMOL/L (ref 98–107)
CHLORIDE BLD-SCNC: 95 MMOL/L (ref 98–107)
CHLORIDE BLD-SCNC: 95 MMOL/L (ref 98–107)
CHLORIDE BLD-SCNC: 96 MMOL/L (ref 98–107)
CO2: 26 MMOL/L (ref 20–31)
CO2: 27 MMOL/L (ref 20–31)
CO2: 27 MMOL/L (ref 20–31)
CO2: 28 MMOL/L (ref 20–31)
CO2: 30 MMOL/L (ref 20–31)
CO2: 31 MMOL/L (ref 20–31)
COLOR: YELLOW
COMMENT UA: ABNORMAL
CREAT SERPL-MCNC: 1.66 MG/DL (ref 0.7–1.2)
CREAT SERPL-MCNC: 1.72 MG/DL (ref 0.7–1.2)
CREAT SERPL-MCNC: 1.74 MG/DL (ref 0.7–1.2)
CREAT SERPL-MCNC: 1.76 MG/DL (ref 0.7–1.2)
CREAT SERPL-MCNC: 1.82 MG/DL (ref 0.7–1.2)
CREAT SERPL-MCNC: 1.83 MG/DL (ref 0.7–1.2)
CRYSTALS, UA: ABNORMAL /HPF
DIFFERENTIAL TYPE: ABNORMAL
EKG ATRIAL RATE: 126 BPM
EKG ATRIAL RATE: 132 BPM
EKG P AXIS: 16 DEGREES
EKG P AXIS: 50 DEGREES
EKG P-R INTERVAL: 112 MS
EKG P-R INTERVAL: 154 MS
EKG Q-T INTERVAL: 300 MS
EKG Q-T INTERVAL: 332 MS
EKG QRS DURATION: 78 MS
EKG QRS DURATION: 82 MS
EKG QTC CALCULATION (BAZETT): 444 MS
EKG QTC CALCULATION (BAZETT): 480 MS
EKG R AXIS: -5 DEGREES
EKG R AXIS: 26 DEGREES
EKG T AXIS: -78 DEGREES
EKG T AXIS: 63 DEGREES
EKG VENTRICULAR RATE: 126 BPM
EKG VENTRICULAR RATE: 132 BPM
EOSINOPHILS RELATIVE PERCENT: 0 % (ref 1–4)
EPITHELIAL CELLS UA: ABNORMAL /HPF (ref 0–5)
GFR AFRICAN AMERICAN: 46 ML/MIN
GFR AFRICAN AMERICAN: 46 ML/MIN
GFR AFRICAN AMERICAN: 48 ML/MIN
GFR AFRICAN AMERICAN: 48 ML/MIN
GFR AFRICAN AMERICAN: 49 ML/MIN
GFR AFRICAN AMERICAN: 51 ML/MIN
GFR NON-AFRICAN AMERICAN: 38 ML/MIN
GFR NON-AFRICAN AMERICAN: 38 ML/MIN
GFR NON-AFRICAN AMERICAN: 39 ML/MIN
GFR NON-AFRICAN AMERICAN: 40 ML/MIN
GFR NON-AFRICAN AMERICAN: 40 ML/MIN
GFR NON-AFRICAN AMERICAN: 42 ML/MIN
GFR SERPL CREATININE-BSD FRML MDRD: ABNORMAL ML/MIN/{1.73_M2}
GLUCOSE BLD-MCNC: 106 MG/DL (ref 75–110)
GLUCOSE BLD-MCNC: 120 MG/DL (ref 75–110)
GLUCOSE BLD-MCNC: 122 MG/DL (ref 75–110)
GLUCOSE BLD-MCNC: 131 MG/DL (ref 75–110)
GLUCOSE BLD-MCNC: 136 MG/DL (ref 70–99)
GLUCOSE BLD-MCNC: 163 MG/DL (ref 70–99)
GLUCOSE BLD-MCNC: 163 MG/DL (ref 75–110)
GLUCOSE BLD-MCNC: 164 MG/DL (ref 75–110)
GLUCOSE BLD-MCNC: 165 MG/DL (ref 70–99)
GLUCOSE BLD-MCNC: 165 MG/DL (ref 75–110)
GLUCOSE BLD-MCNC: 165 MG/DL (ref 75–110)
GLUCOSE BLD-MCNC: 170 MG/DL (ref 75–110)
GLUCOSE BLD-MCNC: 170 MG/DL (ref 75–110)
GLUCOSE BLD-MCNC: 174 MG/DL (ref 75–110)
GLUCOSE BLD-MCNC: 174 MG/DL (ref 75–110)
GLUCOSE BLD-MCNC: 175 MG/DL (ref 75–110)
GLUCOSE BLD-MCNC: 179 MG/DL (ref 75–110)
GLUCOSE BLD-MCNC: 186 MG/DL (ref 70–99)
GLUCOSE BLD-MCNC: 191 MG/DL (ref 75–110)
GLUCOSE BLD-MCNC: 196 MG/DL (ref 75–110)
GLUCOSE BLD-MCNC: 198 MG/DL (ref 70–99)
GLUCOSE BLD-MCNC: 198 MG/DL (ref 75–110)
GLUCOSE BLD-MCNC: 201 MG/DL (ref 70–99)
GLUCOSE BLD-MCNC: 210 MG/DL (ref 75–110)
GLUCOSE BLD-MCNC: 217 MG/DL (ref 75–110)
GLUCOSE BLD-MCNC: 242 MG/DL (ref 75–110)
GLUCOSE BLD-MCNC: 265 MG/DL (ref 75–110)
GLUCOSE URINE: ABNORMAL
HCT VFR BLD CALC: 35.7 % (ref 40.7–50.3)
HEMOGLOBIN: 11.3 G/DL (ref 13–17)
IMMATURE GRANULOCYTES: 1 %
KETONES, URINE: ABNORMAL
LEUKOCYTE ESTERASE, URINE: ABNORMAL
LV EF: 60 %
LVEF MODALITY: NORMAL
LYMPHOCYTES # BLD: 8 % (ref 24–43)
MAGNESIUM: 1.6 MG/DL (ref 1.6–2.6)
MAGNESIUM: 1.8 MG/DL (ref 1.6–2.6)
MAGNESIUM: 1.9 MG/DL (ref 1.6–2.6)
MCH RBC QN AUTO: 27.2 PG (ref 25.2–33.5)
MCHC RBC AUTO-ENTMCNC: 31.7 G/DL (ref 28.4–34.8)
MCV RBC AUTO: 86 FL (ref 82.6–102.9)
MONOCYTES # BLD: 7 % (ref 3–12)
MUCUS: ABNORMAL
MYOGLOBIN: 291 NG/ML (ref 28–72)
MYOGLOBIN: 439 NG/ML (ref 28–72)
MYOGLOBIN: 694 NG/ML (ref 28–72)
NITRITE, URINE: NEGATIVE
NRBC AUTOMATED: 0 PER 100 WBC
OTHER OBSERVATIONS UA: ABNORMAL
PDW BLD-RTO: 12.7 % (ref 11.8–14.4)
PH UA: 8.5 (ref 5–8)
PHOSPHORUS: 3.1 MG/DL (ref 2.5–4.5)
PHOSPHORUS: 3.1 MG/DL (ref 2.5–4.5)
PHOSPHORUS: 3.6 MG/DL (ref 2.5–4.5)
PHOSPHORUS: 3.7 MG/DL (ref 2.5–4.5)
PHOSPHORUS: 4.1 MG/DL (ref 2.5–4.5)
PHOSPHORUS: 4.2 MG/DL (ref 2.5–4.5)
PLATELET # BLD: 514 K/UL (ref 138–453)
PLATELET ESTIMATE: ABNORMAL
PMV BLD AUTO: 10.1 FL (ref 8.1–13.5)
POTASSIUM SERPL-SCNC: 3.7 MMOL/L (ref 3.7–5.3)
POTASSIUM SERPL-SCNC: 3.9 MMOL/L (ref 3.7–5.3)
POTASSIUM SERPL-SCNC: 4 MMOL/L (ref 3.7–5.3)
POTASSIUM SERPL-SCNC: 4 MMOL/L (ref 3.7–5.3)
POTASSIUM SERPL-SCNC: 4.2 MMOL/L (ref 3.7–5.3)
POTASSIUM SERPL-SCNC: 4.4 MMOL/L (ref 3.7–5.3)
PROCALCITONIN: 0.2 NG/ML
PROCALCITONIN: 0.21 NG/ML
PROTEIN UA: ABNORMAL
RBC # BLD: 4.15 M/UL (ref 4.21–5.77)
RBC # BLD: ABNORMAL 10*6/UL
RBC UA: ABNORMAL /HPF (ref 0–2)
RENAL EPITHELIAL, UA: ABNORMAL /HPF
SARS-COV-2, PCR: NORMAL
SARS-COV-2, RAPID: NOT DETECTED
SARS-COV-2: NORMAL
SEG NEUTROPHILS: 83 % (ref 36–65)
SEGMENTED NEUTROPHILS ABSOLUTE COUNT: 18.26 K/UL (ref 1.5–8.1)
SODIUM BLD-SCNC: 137 MMOL/L (ref 135–144)
SODIUM BLD-SCNC: 137 MMOL/L (ref 135–144)
SODIUM BLD-SCNC: 138 MMOL/L (ref 135–144)
SODIUM BLD-SCNC: 139 MMOL/L (ref 135–144)
SODIUM BLD-SCNC: 140 MMOL/L (ref 135–144)
SODIUM BLD-SCNC: 140 MMOL/L (ref 135–144)
SOURCE: NORMAL
SPECIFIC GRAVITY UA: 1.01 (ref 1–1.03)
TRICHOMONAS: ABNORMAL
TROPONIN INTERP: ABNORMAL
TROPONIN T: ABNORMAL NG/ML
TROPONIN, HIGH SENSITIVITY: 14 NG/L (ref 0–22)
TROPONIN, HIGH SENSITIVITY: 15 NG/L (ref 0–22)
TROPONIN, HIGH SENSITIVITY: 17 NG/L (ref 0–22)
TURBIDITY: ABNORMAL
URINE HGB: NEGATIVE
UROBILINOGEN, URINE: NORMAL
WBC # BLD: 22 K/UL (ref 3.5–11.3)
WBC # BLD: ABNORMAL 10*3/UL
WBC UA: ABNORMAL /HPF (ref 0–5)
YEAST: ABNORMAL

## 2020-09-01 PROCEDURE — 2500000003 HC RX 250 WO HCPCS: Performed by: INTERNAL MEDICINE

## 2020-09-01 PROCEDURE — 3700000001 HC ADD 15 MINUTES (ANESTHESIA): Performed by: UROLOGY

## 2020-09-01 PROCEDURE — 3700000000 HC ANESTHESIA ATTENDED CARE: Performed by: UROLOGY

## 2020-09-01 PROCEDURE — 2580000003 HC RX 258: Performed by: EMERGENCY MEDICINE

## 2020-09-01 PROCEDURE — 3600000012 HC SURGERY LEVEL 2 ADDTL 15MIN: Performed by: UROLOGY

## 2020-09-01 PROCEDURE — C1769 GUIDE WIRE: HCPCS | Performed by: UROLOGY

## 2020-09-01 PROCEDURE — 6360000002 HC RX W HCPCS: Performed by: INTERNAL MEDICINE

## 2020-09-01 PROCEDURE — 80048 BASIC METABOLIC PNL TOTAL CA: CPT

## 2020-09-01 PROCEDURE — 74018 RADEX ABDOMEN 1 VIEW: CPT

## 2020-09-01 PROCEDURE — 2580000003 HC RX 258: Performed by: INTERNAL MEDICINE

## 2020-09-01 PROCEDURE — 83735 ASSAY OF MAGNESIUM: CPT

## 2020-09-01 PROCEDURE — 36415 COLL VENOUS BLD VENIPUNCTURE: CPT

## 2020-09-01 PROCEDURE — 87040 BLOOD CULTURE FOR BACTERIA: CPT

## 2020-09-01 PROCEDURE — 85025 COMPLETE CBC W/AUTO DIFF WBC: CPT

## 2020-09-01 PROCEDURE — 2580000003 HC RX 258: Performed by: SURGERY

## 2020-09-01 PROCEDURE — C2617 STENT, NON-COR, TEM W/O DEL: HCPCS | Performed by: UROLOGY

## 2020-09-01 PROCEDURE — 2500000003 HC RX 250 WO HCPCS: Performed by: FAMILY MEDICINE

## 2020-09-01 PROCEDURE — 2580000003 HC RX 258: Performed by: FAMILY MEDICINE

## 2020-09-01 PROCEDURE — 84145 PROCALCITONIN (PCT): CPT

## 2020-09-01 PROCEDURE — 83605 ASSAY OF LACTIC ACID: CPT

## 2020-09-01 PROCEDURE — 3600000002 HC SURGERY LEVEL 2 BASE: Performed by: UROLOGY

## 2020-09-01 PROCEDURE — 87641 MR-STAPH DNA AMP PROBE: CPT

## 2020-09-01 PROCEDURE — 2500000003 HC RX 250 WO HCPCS: Performed by: ANESTHESIOLOGY

## 2020-09-01 PROCEDURE — 80076 HEPATIC FUNCTION PANEL: CPT

## 2020-09-01 PROCEDURE — 2000000000 HC ICU R&B

## 2020-09-01 PROCEDURE — 6370000000 HC RX 637 (ALT 250 FOR IP): Performed by: EMERGENCY MEDICINE

## 2020-09-01 PROCEDURE — 0T768DZ DILATION OF RIGHT URETER WITH INTRALUMINAL DEVICE, VIA NATURAL OR ARTIFICIAL OPENING ENDOSCOPIC: ICD-10-PCS | Performed by: UROLOGY

## 2020-09-01 PROCEDURE — 93306 TTE W/DOPPLER COMPLETE: CPT

## 2020-09-01 PROCEDURE — U0002 COVID-19 LAB TEST NON-CDC: HCPCS

## 2020-09-01 PROCEDURE — 84484 ASSAY OF TROPONIN QUANT: CPT

## 2020-09-01 PROCEDURE — 71045 X-RAY EXAM CHEST 1 VIEW: CPT

## 2020-09-01 PROCEDURE — 83874 ASSAY OF MYOGLOBIN: CPT

## 2020-09-01 PROCEDURE — 2580000003 HC RX 258: Performed by: NURSE PRACTITIONER

## 2020-09-01 PROCEDURE — 6360000002 HC RX W HCPCS: Performed by: NURSE PRACTITIONER

## 2020-09-01 PROCEDURE — 82330 ASSAY OF CALCIUM: CPT

## 2020-09-01 PROCEDURE — 84100 ASSAY OF PHOSPHORUS: CPT

## 2020-09-01 PROCEDURE — 7100000001 HC PACU RECOVERY - ADDTL 15 MIN: Performed by: UROLOGY

## 2020-09-01 PROCEDURE — 3209999900 FLUORO FOR SURGICAL PROCEDURES

## 2020-09-01 PROCEDURE — 7100000000 HC PACU RECOVERY - FIRST 15 MIN: Performed by: UROLOGY

## 2020-09-01 PROCEDURE — 6360000002 HC RX W HCPCS: Performed by: ANESTHESIOLOGY

## 2020-09-01 PROCEDURE — 2580000003 HC RX 258: Performed by: UROLOGY

## 2020-09-01 PROCEDURE — 6360000002 HC RX W HCPCS: Performed by: FAMILY MEDICINE

## 2020-09-01 PROCEDURE — 2709999900 HC NON-CHARGEABLE SUPPLY: Performed by: UROLOGY

## 2020-09-01 DEVICE — STENT URET 6FR L28CM PERCFLX HYDR+ DBL PGTL THRD 2: Type: IMPLANTABLE DEVICE | Site: URETER | Status: FUNCTIONAL

## 2020-09-01 RX ORDER — SODIUM CHLORIDE 0.9 % (FLUSH) 0.9 %
10 SYRINGE (ML) INJECTION EVERY 12 HOURS SCHEDULED
Status: CANCELLED | OUTPATIENT
Start: 2020-09-01

## 2020-09-01 RX ORDER — METOPROLOL TARTRATE 5 MG/5ML
5 INJECTION INTRAVENOUS EVERY 6 HOURS
Status: DISCONTINUED | OUTPATIENT
Start: 2020-09-01 | End: 2020-09-01 | Stop reason: HOSPADM

## 2020-09-01 RX ORDER — PROMETHAZINE HYDROCHLORIDE 25 MG/1
12.5 TABLET ORAL EVERY 6 HOURS PRN
Status: DISCONTINUED | OUTPATIENT
Start: 2020-09-01 | End: 2020-09-08

## 2020-09-01 RX ORDER — 0.9 % SODIUM CHLORIDE 0.9 %
500 INTRAVENOUS SOLUTION INTRAVENOUS ONCE
Status: COMPLETED | OUTPATIENT
Start: 2020-09-01 | End: 2020-09-01

## 2020-09-01 RX ORDER — SODIUM CHLORIDE 0.9 % (FLUSH) 0.9 %
10 SYRINGE (ML) INJECTION PRN
Status: CANCELLED | OUTPATIENT
Start: 2020-09-01

## 2020-09-01 RX ORDER — SUCCINYLCHOLINE/SOD CL,ISO/PF 100 MG/5ML
SYRINGE (ML) INTRAVENOUS PRN
Status: DISCONTINUED | OUTPATIENT
Start: 2020-09-01 | End: 2020-09-01 | Stop reason: SDUPTHER

## 2020-09-01 RX ORDER — MORPHINE SULFATE 2 MG/ML
1 INJECTION, SOLUTION INTRAMUSCULAR; INTRAVENOUS EVERY 4 HOURS PRN
Status: DISCONTINUED | OUTPATIENT
Start: 2020-09-01 | End: 2020-09-01 | Stop reason: HOSPADM

## 2020-09-01 RX ORDER — SODIUM CHLORIDE 0.9 % (FLUSH) 0.9 %
10 SYRINGE (ML) INJECTION PRN
Status: DISCONTINUED | OUTPATIENT
Start: 2020-09-01 | End: 2020-09-11 | Stop reason: HOSPADM

## 2020-09-01 RX ORDER — SODIUM CHLORIDE 9 MG/ML
INJECTION, SOLUTION INTRAVENOUS CONTINUOUS
Status: DISCONTINUED | OUTPATIENT
Start: 2020-09-01 | End: 2020-09-09

## 2020-09-01 RX ORDER — ONDANSETRON 2 MG/ML
4 INJECTION INTRAMUSCULAR; INTRAVENOUS EVERY 6 HOURS PRN
Status: DISCONTINUED | OUTPATIENT
Start: 2020-09-01 | End: 2020-09-11 | Stop reason: HOSPADM

## 2020-09-01 RX ORDER — SODIUM CHLORIDE, SODIUM LACTATE, POTASSIUM CHLORIDE, CALCIUM CHLORIDE 600; 310; 30; 20 MG/100ML; MG/100ML; MG/100ML; MG/100ML
INJECTION, SOLUTION INTRAVENOUS ONCE
Status: COMPLETED | OUTPATIENT
Start: 2020-09-01 | End: 2020-09-01

## 2020-09-01 RX ORDER — MORPHINE SULFATE 2 MG/ML
1 INJECTION, SOLUTION INTRAMUSCULAR; INTRAVENOUS ONCE
Status: COMPLETED | OUTPATIENT
Start: 2020-09-01 | End: 2020-09-01

## 2020-09-01 RX ORDER — FENTANYL CITRATE 50 UG/ML
25 INJECTION, SOLUTION INTRAMUSCULAR; INTRAVENOUS EVERY 5 MIN PRN
Status: DISCONTINUED | OUTPATIENT
Start: 2020-09-01 | End: 2020-09-01 | Stop reason: HOSPADM

## 2020-09-01 RX ORDER — ONDANSETRON 2 MG/ML
INJECTION INTRAMUSCULAR; INTRAVENOUS PRN
Status: DISCONTINUED | OUTPATIENT
Start: 2020-09-01 | End: 2020-09-01 | Stop reason: SDUPTHER

## 2020-09-01 RX ORDER — FENTANYL CITRATE 50 UG/ML
INJECTION, SOLUTION INTRAMUSCULAR; INTRAVENOUS PRN
Status: DISCONTINUED | OUTPATIENT
Start: 2020-09-01 | End: 2020-09-01 | Stop reason: SDUPTHER

## 2020-09-01 RX ORDER — ONDANSETRON 2 MG/ML
4 INJECTION INTRAMUSCULAR; INTRAVENOUS
Status: DISCONTINUED | OUTPATIENT
Start: 2020-09-01 | End: 2020-09-01 | Stop reason: HOSPADM

## 2020-09-01 RX ORDER — SODIUM CHLORIDE 0.9 % (FLUSH) 0.9 %
10 SYRINGE (ML) INJECTION EVERY 12 HOURS SCHEDULED
Status: DISCONTINUED | OUTPATIENT
Start: 2020-09-01 | End: 2020-09-11 | Stop reason: HOSPADM

## 2020-09-01 RX ORDER — HYDROMORPHONE HCL 110MG/55ML
0.25 PATIENT CONTROLLED ANALGESIA SYRINGE INTRAVENOUS EVERY 5 MIN PRN
Status: DISCONTINUED | OUTPATIENT
Start: 2020-09-01 | End: 2020-09-01 | Stop reason: HOSPADM

## 2020-09-01 RX ORDER — LIDOCAINE HYDROCHLORIDE 20 MG/ML
INJECTION, SOLUTION EPIDURAL; INFILTRATION; INTRACAUDAL; PERINEURAL PRN
Status: DISCONTINUED | OUTPATIENT
Start: 2020-09-01 | End: 2020-09-01 | Stop reason: SDUPTHER

## 2020-09-01 RX ORDER — SODIUM CHLORIDE 9 MG/ML
INJECTION, SOLUTION INTRAVENOUS CONTINUOUS
Status: DISCONTINUED | OUTPATIENT
Start: 2020-09-01 | End: 2020-09-01 | Stop reason: HOSPADM

## 2020-09-01 RX ORDER — PROPOFOL 10 MG/ML
INJECTION, EMULSION INTRAVENOUS PRN
Status: DISCONTINUED | OUTPATIENT
Start: 2020-09-01 | End: 2020-09-01 | Stop reason: SDUPTHER

## 2020-09-01 RX ADMIN — ONDANSETRON 4 MG: 2 INJECTION INTRAMUSCULAR; INTRAVENOUS at 00:35

## 2020-09-01 RX ADMIN — CEFEPIME HYDROCHLORIDE 2 G: 2 INJECTION, POWDER, FOR SOLUTION INTRAVENOUS at 03:40

## 2020-09-01 RX ADMIN — ONDANSETRON 4 MG: 2 INJECTION, SOLUTION INTRAMUSCULAR; INTRAVENOUS at 21:27

## 2020-09-01 RX ADMIN — MORPHINE SULFATE 1 MG: 2 INJECTION, SOLUTION INTRAMUSCULAR; INTRAVENOUS at 10:09

## 2020-09-01 RX ADMIN — SODIUM CHLORIDE 7.98 UNITS/HR: 9 INJECTION, SOLUTION INTRAVENOUS at 05:22

## 2020-09-01 RX ADMIN — VANCOMYCIN HYDROCHLORIDE 1 G: 1 INJECTION, POWDER, LYOPHILIZED, FOR SOLUTION INTRAVENOUS at 20:31

## 2020-09-01 RX ADMIN — METOPROLOL TARTRATE 5 MG: 5 INJECTION INTRAVENOUS at 12:09

## 2020-09-01 RX ADMIN — SODIUM CHLORIDE, POTASSIUM CHLORIDE, SODIUM LACTATE AND CALCIUM CHLORIDE: 600; 310; 30; 20 INJECTION, SOLUTION INTRAVENOUS at 01:15

## 2020-09-01 RX ADMIN — METOPROLOL TARTRATE 5 MG: 5 INJECTION INTRAVENOUS at 03:45

## 2020-09-01 RX ADMIN — METRONIDAZOLE 500 MG: 500 INJECTION, SOLUTION INTRAVENOUS at 19:11

## 2020-09-01 RX ADMIN — SODIUM CHLORIDE 500 ML: 9 INJECTION, SOLUTION INTRAVENOUS at 17:33

## 2020-09-01 RX ADMIN — Medication 50 MCG: at 21:18

## 2020-09-01 RX ADMIN — DEXTROSE AND SODIUM CHLORIDE: 5; 450 INJECTION, SOLUTION INTRAVENOUS at 11:37

## 2020-09-01 RX ADMIN — POTASSIUM CHLORIDE 10 MEQ: 7.46 INJECTION, SOLUTION INTRAVENOUS at 06:13

## 2020-09-01 RX ADMIN — SODIUM CHLORIDE 500 ML: 9 INJECTION, SOLUTION INTRAVENOUS at 15:05

## 2020-09-01 RX ADMIN — POTASSIUM CHLORIDE 10 MEQ: 7.46 INJECTION, SOLUTION INTRAVENOUS at 07:03

## 2020-09-01 RX ADMIN — POTASSIUM CHLORIDE 10 MEQ: 7.46 INJECTION, SOLUTION INTRAVENOUS at 03:42

## 2020-09-01 RX ADMIN — CEFEPIME HYDROCHLORIDE 2 G: 2 INJECTION, POWDER, FOR SOLUTION INTRAVENOUS at 14:59

## 2020-09-01 RX ADMIN — POTASSIUM CHLORIDE 10 MEQ: 7.46 INJECTION, SOLUTION INTRAVENOUS at 08:08

## 2020-09-01 RX ADMIN — METOPROLOL TARTRATE 5 MG: 5 INJECTION INTRAVENOUS at 17:46

## 2020-09-01 RX ADMIN — POTASSIUM CHLORIDE 10 MEQ: 7.46 INJECTION, SOLUTION INTRAVENOUS at 04:49

## 2020-09-01 RX ADMIN — POTASSIUM CHLORIDE 10 MEQ: 7.46 INJECTION, SOLUTION INTRAVENOUS at 02:36

## 2020-09-01 RX ADMIN — SODIUM CHLORIDE: 9 INJECTION, SOLUTION INTRAVENOUS at 20:33

## 2020-09-01 RX ADMIN — Medication 100 MG: at 21:19

## 2020-09-01 RX ADMIN — PROPOFOL 120 MG: 10 INJECTION, EMULSION INTRAVENOUS at 21:19

## 2020-09-01 RX ADMIN — SODIUM CHLORIDE 500 ML: 9 INJECTION, SOLUTION INTRAVENOUS at 19:28

## 2020-09-01 RX ADMIN — LIDOCAINE HYDROCHLORIDE 5 ML: 20 INJECTION, SOLUTION EPIDURAL; INFILTRATION; INTRACAUDAL; PERINEURAL at 21:19

## 2020-09-01 RX ADMIN — SODIUM CHLORIDE: 9 INJECTION, SOLUTION INTRAVENOUS at 14:59

## 2020-09-01 RX ADMIN — MORPHINE SULFATE 1 MG: 2 INJECTION, SOLUTION INTRAMUSCULAR; INTRAVENOUS at 02:36

## 2020-09-01 ASSESSMENT — PAIN SCALES - GENERAL
PAINLEVEL_OUTOF10: 0
PAINLEVEL_OUTOF10: 6
PAINLEVEL_OUTOF10: 0
PAINLEVEL_OUTOF10: 6
PAINLEVEL_OUTOF10: 5

## 2020-09-01 ASSESSMENT — PULMONARY FUNCTION TESTS
PIF_VALUE: 16
PIF_VALUE: 0
PIF_VALUE: 1
PIF_VALUE: 1
PIF_VALUE: 2
PIF_VALUE: 1
PIF_VALUE: 17
PIF_VALUE: 0
PIF_VALUE: 3
PIF_VALUE: 1
PIF_VALUE: 16
PIF_VALUE: 14
PIF_VALUE: 18
PIF_VALUE: 1
PIF_VALUE: 1
PIF_VALUE: 21
PIF_VALUE: 1
PIF_VALUE: 15
PIF_VALUE: 1
PIF_VALUE: 2
PIF_VALUE: 2
PIF_VALUE: 1
PIF_VALUE: 16
PIF_VALUE: 2
PIF_VALUE: 3
PIF_VALUE: 28
PIF_VALUE: 17
PIF_VALUE: 16

## 2020-09-01 ASSESSMENT — PAIN DESCRIPTION - PAIN TYPE
TYPE: ACUTE PAIN
TYPE: ACUTE PAIN

## 2020-09-01 ASSESSMENT — PAIN DESCRIPTION - LOCATION
LOCATION: ABDOMEN
LOCATION: ABDOMEN

## 2020-09-01 ASSESSMENT — PAIN DESCRIPTION - DESCRIPTORS
DESCRIPTORS: PRESSURE
DESCRIPTORS: PRESSURE

## 2020-09-01 NOTE — FLOWSHEET NOTE
Patient states well. No major needs, worries, fears. Patient shared about his medical condition. States good family support.  shared in presence, support. Follow up as needed. 09/01/20 1008   Encounter Summary   Services provided to: Patient   Referral/Consult From: [facility]   Support System Spouse   Continue Visiting   (9-1-20)   Complexity of Encounter Low   Length of Encounter 15 minutes   Spiritual Assessment Completed Yes   Routine   Type Initial   Assessment Passive   Intervention Explored feelings, thoughts, concerns; Active listening;Discussed illness/injury and it's impact; Discussed belief system/Mu-ism practices/jose armando   Outcome Expressed gratitude;Receptive;Engaged in conversation;Expressed feelings/needs/concerns

## 2020-09-01 NOTE — PROGRESS NOTES
Diagnosis Date Noted    Bowel obstruction [facility]) [E13.648] 08/31/2020       56 y.o. male with bowel obstruction secondary to renal mass      Plan:  1. Diet: N.p.o.  2. NG tube to low intermittent wall suction  3. Pain KUB for adequate placement  4. Continue IV fluid for resuscitation  5. Medical management per primary team.  Will follow along.     Electronically signed by Alison Frank DO  on 9/1/2020 at 4:52 AM

## 2020-09-01 NOTE — CONSULTS
Cardiovascular Consult Note     TODAY'S DATE: 9/1/2020    Patient name: Coretta Medina II   YOB: 1960  Date of admission:  8/31/2020       Patient seen, examined. Previous clinical entries reviewed. All available laboratory, imaging and ancillary data reviewed. Reason for Consult: Tachycardia  Referring Physician: Dr. Ayesha Novak MD     History of present Illness:     Coretta Medina II is a 56 y.o. male with past medical history significant for obstructive sleep apnea, hypertension, diabetes mellitus, renal cancer diagnosed a month ago who presented to [facility] with complaints of abdominal pain. On routine work-up the renal mass was found to cause some small bowel obstruction. He had an NG tube placed in his small bowel obstruction has improved. He has been started on multiple antibiotics for potential sepsis. He has been evaluated and being transferred to [address] [place] [address] for further care. Here in the ICU at [facility], he was noted to be tachycardic and therefore cardiology was consulted. He is running sinus tachycardia. He has been given multiple doses of IV fluids. He has been started on IV metoprolol for better heart rate control. Past Medical History:    has a past medical history of Back pain, Cellulitis, Diabetes mellitus ([address] Gout, History of kidney cancer, and Hypertension.     Surgical History:     Past Surgical History:   Procedure Laterality Date    ANKLE SURGERY      CARPAL TUNNEL RELEASE Bilateral     KNEE SURGERY Bilateral        Medications:   Scheduled Meds:   metoprolol  5 mg Intravenous Q6H    insulin lispro  0-12 Units Subcutaneous TID WC    insulin lispro  0-6 Units Subcutaneous Nightly    sodium chloride  500 mL Intravenous Once    vancomycin  1 g Intravenous Once    metroNIDAZOLE  500 mg Intravenous Q8H    sodium chloride  500 mL Intravenous Once    cefepime  2 g Intravenous Q12H    sodium chloride flush  10 mL

## 2020-09-01 NOTE — CONSULTS
Pulmonary Medicine and Critical Care Consult    Patient - Paramjit Gaston   MRN -  [id number]   Acct # - [de-identified]   - 1960      Date of Admission -  2020  1:49 PM  Date of evaluation -  2020  Room - [address] [place] [address]   Lesvia Rivera MD Primary Care Physician - Pascual Barroso MD     Reason for Consult      ICU management   Assessment     · Small bowel obstruction likely due to renal involvement renal cell carcinoma/left renal mass  · dehydration / acute renal insufficiency   · Sepsis? Leukocytosis  · Obstructive sleep apnea/Obesity    Recommendations   · O2 NC  · Keep off home CPAP for now  · Incentive spirometry every hour WA  · NG to suction / NPO  · Blood culture / urine culture   · Cefepime   · Sepsis protocol; Check lactate and procalcitonin  · 500 ml NS bolus  · 30 ml/ kg fluid ;  3 liters given so far   ·  /hr continue D5 half-normal saline  · Surgery on consult   · Wean off insulin  Drip  · Monitor blood sugars  · Insert Bryant/monitor urine output and creatinine  · DVT prophylaxis    Problem List      Patient Active Problem List   Diagnosis    Cellulitis    Type 2 diabetes mellitus without complication ([address]    Essential hypertension    Morbid obesity due to excess calories ([address]    Chronic kidney disease (CKD)    DEBBI (acute kidney injury) ([address]    Bowel obstruction (HCC)       HPI     Ancel Riff II is 56 y.o.,  male, PMH of obstructive sleep apnea on nocturnal CPAP HTN, DM, back pain and renal cancer diagnosed 1 month ago. He presents emergency room for nausea vomiting abdominal pain that worsened over the last 2 days. He had no fever or chills. He had no shortness of breath. No chest pain. He had CT abdomen done emergency room that showed small bowel obstruction. He had NG  in place with around 6 L out. He was tachycardic on presentation. Received IV fluids and heart rate improved. Manfred Parker   He was started on cefepime given leukocytosis for possible sepsis. He has improved abdominal pain at this point. Blood pressure remained stable. He had hyperglycemia and has been on insulin drip at 10 units an hour. He has been on D5 half-normal saline at home and 50 an hour. PMHx   Past Medical History      Diagnosis Date    Back pain     Cellulitis     [facility]     Gout     History of kidney cancer 08/10/2020    Recent diagnosis    Hypertension       Past Surgical History        Procedure Laterality Date    ANKLE SURGERY      CARPAL TUNNEL RELEASE Bilateral     KNEE SURGERY Bilateral        Meds    Current Medications    metoprolol  5 mg Intravenous Q6H    cefepime  2 g Intravenous Q12H    sodium chloride flush  10 mL Intravenous 2 times per day    allopurinol  100 mg Oral Daily    aspirin  81 mg Oral Nightly    hydrALAZINE  25 mg Oral TID    lisinopril  20 mg Oral Daily    metoprolol tartrate  50 mg Oral BID    pravastatin  20 mg Oral Nightly    alogliptin  6.25 mg Oral Daily    vitamin C  500 mg Oral Daily    sodium chloride flush  10 mL Intravenous 2 times per day    enoxaparin  30 mg Subcutaneous BID     morphine, sodium chloride flush, lidocaine, cyclobenzaprine, furosemide, lidocaine, melatonin, zolpidem, sodium chloride flush, acetaminophen **OR** acetaminophen, polyethylene glycol, promethazine **OR** ondansetron, dextrose, potassium chloride, magnesium sulfate, sodium phosphate IVPB **OR** sodium phosphate IVPB **OR** sodium phosphate IVPB  IV Drips/Infusions   sodium chloride      insulin 10.48 Units/hr (09/01/20 1220)     Home Medications  Medications Prior to Admission: traMADol (ULTRAM) 50 MG tablet, Take 100 mg by mouth every 6 hours as needed for Pain.   Melatonin 10 MG TABS, Take 10 mg by mouth nightly as needed (SLEEP)  vitamin C (ASCORBIC ACID) 500 MG tablet, Take 500 mg by mouth daily  lidocaine 4 % external patch, Place 1 patch onto the skin daily (Patient taking differently: Place 1 patch onto the skin daily as needed (TO HIP) )  acetaminophen (TYLENOL) 500 MG tablet, Take 2 tablets by mouth every 6 hours as needed for Pain  cyclobenzaprine (FLEXERIL) 10 MG tablet, Take 1 tablet by mouth 3 times daily as needed for Muscle spasms (Patient taking differently: Take 10 mg by mouth 2 times daily as needed for Muscle spasms )  aspirin 81 MG tablet, Take 81 mg by mouth nightly   SITagliptin (JANUVIA) 50 MG tablet, Take 25 mg by mouth daily   pravastatin (PRAVACHOL) 20 MG tablet, Take 20 mg by mouth daily  metoprolol (LOPRESSOR) 50 MG tablet, Take 50 mg by mouth 2 times daily  lisinopril (PRINIVIL;ZESTRIL) 20 MG tablet, Take 20 mg by mouth daily  allopurinol (ZYLOPRIM) 100 MG tablet, Take 100 mg by mouth daily  zolpidem (AMBIEN) 10 MG tablet, Take by mouth nightly as needed for Sleep  hydrALAZINE (APRESOLINE) 25 MG tablet, Take 25 mg by mouth 3 times daily   furosemide (LASIX) 40 MG tablet, Take 40 mg by mouth daily as needed (LEG SWELLING)     Allergies    Ampicillin; Pcn [penicillins]; and Tape [place] tape]  Social History     Social History     Socioeconomic History    Marital status:      Spouse name: Not on file    Number of children: Not on file    Years of education: Not on file    Highest education level: Not on file   Occupational History    Not on file   Social Needs    Financial resource strain: Not on file    Food insecurity     Worry: Not on file     Inability: Not on file    Transportation needs     Medical: Not on file     Non-medical: Not on file   Tobacco Use    Smoking status: Never Smoker    Smokeless tobacco: Never Used   Substance and Sexual Activity    Alcohol use:  Yes     Alcohol/week: 0.0 standard drinks     Comment: once a week    Drug use: No    Sexual activity: Not on file   Lifestyle    Physical activity     Days per week: Not on file     Minutes per session: Not on file    Stress: Not on file   Relationships    Social connections     Talks on phone: Not on file     Gets together: Not on file     Attends Episcopal service: Not on file     Active member of club or organization: Not on file     Attends meetings of clubs or organizations: Not on file     Relationship status: Not on file    Intimate partner violence     Fear of current or ex partner: Not on file     Emotionally abused: Not on file     Physically abused: Not on file     Forced sexual activity: Not on file   Other Topics Concern    Not on file   Social History Narrative    Not on file     Family History          Problem Relation Age of Onset    Stroke Maternal Grandmother     Stroke Maternal Grandfather      ROS - 11 systems   General Denies any fever or chills  HEENT Denies any diplopia, tinnitus or vertigo  Resp    As above  Cardiac Denies any chest pain, palpitations, claudication or edema  GI Denies any melena, hematochezia, hematemesis or pyrosis   Denies any frequency, urgency, hesitancy or incontinence  Heme Denies bruising or bleeding easily  Endocrine Denies any history of diabetes or thyroid disease  Neuro Denies any focal motor  deficits  Psychiatric Denies anxiety, depression, suicidal ideation  Skin Denies rashes, itching, open sores    Vitals     height is 5' 11\" (1.803 m) and weight is 255 lb (115.7 kg). His oral temperature is 99 °F (37.2 °C). His blood pressure is 158/98 (abnormal) and his pulse is 108. His respiration is 13 and oxygen saturation is 99%. Body mass index is 35.57 kg/m². I/O        Intake/Output Summary (Last 24 hours) at 9/1/2020 1356  Last data filed at 9/1/2020 0800  Gross per 24 hour   Intake 5176 ml   Output 1251 ml   Net 3925 ml     I/O last 3 completed shifts: In: 5176 [I.V.:5176]  Out: 1250 [Urine:500; Emesis/NG output:750]   Patient Vitals for the past 96 hrs (Last 3 readings):   Weight   08/31/20 1349 255 lb (115.7 kg)     Exam   General Appearance  Awake, alert, oriented, in no acute distress  HEENT - Head is normocephalic, atraumatic.  Pupil renal mass, presumed malignant measuring 4.8    x 6.9 x 9.9 cm.  Nonobstructive left nephrolithiasis. 3. Trace free pelvic fluid. Findings were discussed with Danuta Robins at 3:37 pm on 8/31/2020. (See actual reports for details)    \"Thank you for asking us to see this patient\"    Case discussed with nurse and patient/family. Questions and concerns addressed.     Electronically signed by     Renetta Brunson MD on 9/1/2020 at 1:56 PM   cc35 min

## 2020-09-01 NOTE — PROGRESS NOTES
Progress Note    9/1/2020   11:46 AM    Name:  Marie Flower  MRN:    [id number]     Acct:     [de-identified]   Room:  [facility]  IP Day:   Progress Note    1     Admit Date: 8/31/2020  1:49 PM  PCP: Shola Hudson MD    Subjective:     C/C:   Chief Complaint   Patient presents with   Limon Emesis    Nausea    Abdominal Pain       Interval History: Status: not changed overall but more comfortable with ng ,iv, and insulin drip   Pt examined chart reviewed as above  Admitted yesterday with bowel obstruction that seems to be secondary to renal tumor presumed malignant. Also had insulin drip to Cover his elevated blood sugar. Overall improved however still the basic issues to give the bowel obstruction removed. The plan is to send him to [facility] which is his preference for hospital to have the surgery done; unfortunately,  [facility] on bypass due to capacity issues  Also involved his critical care surgery and cardiology for preop clearance  Ros  General no weight loss or fever  ent no trouble hearing tasting talking or swallowing  Cardiac [place]  Respiratory no cough or dyspnea  [place]  Ortho no complaints of synovitis or decreased range of motion  Neuro no complaints of gait station or speech  Psych no complaints or nerves or memory  Vascular no claudication or stroke  Endo [place]  Heme no complaints of anemia or blood dyscrasias      Medications: Allergies: Allergies   Allergen Reactions    Ampicillin Swelling     Swelling of throat.  Pcn [Penicillins] Swelling     Throat swelling.     Tape [place]acy [place] Tape]        Current Meds: morphine (PF) injection 1 mg, Q4H PRN  insulin regular (HUMULIN R;NOVOLIN R) 100 Units in sodium chloride 0.9 % 100 mL infusion, Continuous  cefepime (MAXIPIME) 2 g IVPB minibag, Q12H  sodium chloride flush 0.9 % injection 10 mL, 2 times per day  sodium chloride flush 0.9 % injection 10 mL, PRN  lidocaine (XYLOCAINE) 2 % uro-jet 5 mL, PRN  allopurinol (ZYLOPRIM) tablet 100 mg, Daily  aspirin EC tablet 81 mg, Nightly  cyclobenzaprine (FLEXERIL) tablet 10 mg, BID PRN  furosemide (LASIX) tablet 40 mg, Daily PRN  hydrALAZINE (APRESOLINE) tablet 25 mg, TID  lidocaine 4 % external patch 1 patch, Daily PRN  lisinopril (PRINIVIL;ZESTRIL) tablet 20 mg, Daily  melatonin tablet 9 mg, Nightly PRN  metoprolol tartrate (LOPRESSOR) tablet 50 mg, BID  pravastatin (PRAVACHOL) tablet 20 mg, Nightly  alogliptin (NESINA) tablet 6.25 mg, Daily  vitamin C (ASCORBIC ACID) tablet 500 mg, Daily  zolpidem (AMBIEN) tablet 5 mg, Nightly PRN  sodium chloride flush 0.9 % injection 10 mL, 2 times per day  sodium chloride flush 0.9 % injection 10 mL, PRN  acetaminophen (TYLENOL) tablet 650 mg, Q6H PRN    Or  acetaminophen (TYLENOL) suppository 650 mg, Q6H PRN  polyethylene glycol (GLYCOLAX) packet 17 g, Daily PRN  promethazine (PHENERGAN) tablet 12.5 mg, Q6H PRN    Or  ondansetron (ZOFRAN) injection 4 mg, Q6H PRN  enoxaparin (LOVENOX) injection 30 mg, BID  metoprolol (LOPRESSOR) injection 5 mg, Q6H PRN  dextrose 50 % IV solution, PRN  potassium chloride 10 mEq/100 mL IVPB (Peripheral Line), PRN  magnesium sulfate 1 g in dextrose 5% 100 mL IVPB, PRN  sodium phosphate 10 mmol in dextrose 5 % 250 mL IVPB, PRN    Or  sodium phosphate 15 mmol in dextrose 5 % 250 mL IVPB, PRN    Or  sodium phosphate 20 mmol in dextrose 5 % 500 mL IVPB, PRN  dextrose 5 % and 0.45 % sodium chloride infusion, Continuous PRN        Data:     Code Status:  Full Code    Family History   Problem Relation Age of Onset    Stroke Maternal Grandmother     Stroke Maternal Grandfather        Social History     Socioeconomic History    Marital status:      Spouse name: Not on file    Number of children: Not on file    Years of education: Not on file    Highest education level: Not on file   Occupational History    Not on file   Social Needs    Financial resource strain: Not on file    Food insecurity     Worry: Not on file     Inability: Not on file    Transportation needs     Medical: Not on file     Non-medical: Not on file   Tobacco Use    Smoking status: Never Smoker    Smokeless tobacco: Never Used   Substance and Sexual Activity    Alcohol use: Yes     Alcohol/week: 0.0 standard drinks     Comment: once a week    Drug use: No    Sexual activity: Not on file   Lifestyle    Physical activity     Days per week: Not on file     Minutes per session: Not on file    Stress: Not on file   Relationships    Social connections     Talks on phone: Not on file     Gets together: Not on file     Attends Sabianist service: Not on file     Active member of club or organization: Not on file     Attends meetings of clubs or organizations: Not on file     Relationship status: Not on file    Intimate partner violence     Fear of current or ex partner: Not on file     Emotionally abused: Not on file     Physically abused: Not on file     Forced sexual activity: Not on file   Other Topics Concern    Not on file   Social History Narrative    Not on file       Vitals:  BP (!) 148/87   Pulse 103   Temp 98 °F (36.7 °C) (Oral)   Resp 12   Ht 5' 11\" (1.803 m)   Wt 255 lb (115.7 kg)   SpO2 100%   BMI 35.57 kg/m²   Temp (24hrs), Av.3 °F (36.8 °C), Min:98 °F (36.7 °C), Max:98.4 °F (36.9 °C)    Recent Labs     20  0806 20  0906 20  1003 20  1102   POCGLU 165* 170* 165* 175*       I/O (24Hr):     Intake/Output Summary (Last 24 hours) at 2020 1146  Last data filed at 2020 0800  Gross per 24 hour   Intake 5176 ml   Output 1251 ml   Net 3925 ml       Labs:  Daily Labs for 3 Days:    Hematology:  Recent Labs     20  1354 20  0830   WBC 20.0* 16.6* 22.0*   HGB 12.0* 10.9* 11.3*   HCT 39.3* 35.2* 35.7*    470* 514*     Chemistry:  Recent Labs     20  0109 20  0455 20  0830    137 137   K 3.7 4.0 4.2   CL 93* 95* 94*   CO2 26 28 27   GLUCOSE 198* 186* 165*   BUN 36* 36* 37*   CREATININE 1.74* 1.76* 1.82*   MG 1.8 1.8 1.8   CALCIUM 10.2 9.9 9.7   PHOS 3.1 3.7 3.1     Recent Labs     08/31/20  1354   PROT 9.3*   LABALBU 3.6   AST 21   ALT 23   ALKPHOS 104   BILITOT 0.50   BILIDIR 0.14   AMYLASE 64   LIPASE 96*       paraclinics reviewed as posted  Physical Examination:      General appearance: alert, cooperative and mild distress  Mental Status: oriented to person, place and time and normal affect  Lungs: clear to auscultation bilaterally, normal effort  Heart: regular rate and rhythm, no murmur,  Abdomen: soft, minimally tender, nondistended, bowel sounds present all four quadrants, no masses, hepatomegaly or splenomegaly  Extremities: no edema, redness or tenderness in the calves  Skin: no gross lesions, rashes, or induration  ent perrla with eomi conjunctivae pink sclerae clear  Neuro-oriented and alert cn2-12 intact cerebral and cerebellar intact reflexes +2/4 bilaterally  Vascular  Good dp tp carotids  Ortho- no masses or synovitis  Good rom  Lymphatics none palpated in cervical supraclavicular axillary  Or inguinal area    Assessment:        Primary Problem  <principal problem not specified>     Active Hospital Problems    Diagnosis Date Noted    Bowel obstruction ([facility] [K56.609] 08/31/2020   renal mass   hyperglycemia  juliana on cki  Past Medical History:   Diagnosis Date    Back pain     Cellulitis     Diabetes mellitus ([facility]     Gout     History of kidney cancer 08/10/2020    Recent diagnosis    Hypertension         Plan:        Cont same plan of care   iv and ng  Cardiology consult re clearance  Crit care consult  For transfer to [facility] when this can be arranged      Electronically signed by Heidy Tidwell MD on 9/1/2020 at 11:46 AM

## 2020-09-01 NOTE — CONSULTS
General Surgery   Consultation        PATIENT NAME: Lois Mejia   YOB: 1960  MRN: [id number]    ADMISSION DATE: 8/31/2020  1:49 PM     Consulting Physician: Dr. Marci Blanchard Physician: Dr. Man Macedo: 8/31/2020    Consult regarding: SBO, locally advanced renal cancer with invasion into the small bowel    Cc: abdominal distention / nausea      HPI:    The patient is a 56 y.o. male with a known left renal mass who presents to [facility] with complaints of abdominal distention, abdominal pain, nausea and emesis that began 24 hours ago. The patient reports that he felt fine yesterday morning, ate breakfast and mowed the lawn without any issues. He went about his day without any significant abdominal pain but he noted that at around 9:00 PM he began to get increasingly distended and nauseated and then began having episodes of emesis approximately every 30 minutes throughout the night. Ultimately, this brought him to the emergency department for evaluation today at which time he was found to be significantly dehydrated, with acute kidney injury, hyperglycemic with blood glucose in the 400s, and a leukocytosis of 20k. The patient had a CT of the abdomen pelvis which demonstrates the large left renal mass now invading and causing mass-effect on the proximal jejunum which is in turn caused a significant proximal bowel obstruction with distended stomach, duodenum and proximal jejunum. All the bowel distal to this is significantly decompressed. The patient had an NG tube placed with apparently several liters of output initially, and since that time the patient's abdominal pain, distention and nausea has resolved. He states that he has not passed any gas in 24 hours and his last bowel movement was yesterday. Up until yesterday he had been having bowel movements fairly regularly although he reports that they had changed in volume.   At the time of our evaluation he has improved significantly since NG decompression, he is currently awaiting transfer to [facility] and is admitted in the ICU at [facility].  He is on IV fluids, insulin drip, and critical care managing him medically. The patient denies any previous abdominal surgical history. He denies any personal known history of cancer prior to this left renal mass. Urology has been aware of this mass and was planning to do a resection/nephrectomy in several weeks but obviously he has developed acute symptoms in the interim. Consultation requested for small bowel obstruction due to locally advanced renal cell carcinoma with erosion into and obstruction of the small bowel. Past Medical History:        Diagnosis Date    Back pain     Cellulitis     Diabetes mellitus ([facility] [address]     Gout     History of kidney cancer 08/10/2020    Recent diagnosis    Hypertension        Past Surgical History:        Procedure Laterality Date    ANKLE SURGERY      CARPAL TUNNEL RELEASE Bilateral     KNEE SURGERY Bilateral        Medications Prior to Admission:   Medications Prior to Admission: traMADol (ULTRAM) 50 MG tablet, Take 100 mg by mouth every 6 hours as needed for Pain.   Melatonin 10 MG TABS, Take 10 mg by mouth nightly as needed (SLEEP)  vitamin C (ASCORBIC ACID) 500 MG tablet, Take 500 mg by mouth daily  lidocaine 4 % external patch, Place 1 patch onto the skin daily (Patient taking differently: Place 1 patch onto the skin daily as needed (TO HIP) )  acetaminophen (TYLENOL) 500 MG tablet, Take 2 tablets by mouth every 6 hours as needed for Pain  cyclobenzaprine (FLEXERIL) 10 MG tablet, Take 1 tablet by mouth 3 times daily as needed for Muscle spasms (Patient taking differently: Take 10 mg by mouth 2 times daily as needed for Muscle spasms )  aspirin 81 MG tablet, Take 81 mg by mouth nightly   SITagliptin (JANUVIA) 50 MG tablet, Take 25 mg by mouth daily   pravastatin (PRAVACHOL) 20 MG tablet, Take 20 mg by mouth Family History:       Problem Relation Age of Onset    Stroke Maternal Grandmother     Stroke Maternal Grandfather        Review of Systems:    Gen: No fever or chills  Eyes: No blurry vision or conjunctivitis   ENT: No sinus congestion or sore throat  CV: No chest pain or dyspnea on exertion  Pulm: No cough or shortness of breath  GI: Positive for abdominal pain, nausea, emesis  Renal: Positive for known renal mass, no dysuria or hematuria  Endo: No excessive sweating or cold intolerance  Heme/Onc: No excessive bruising or swollen lymph nodes  Neuro: No difficulty with speech or acute extremity weakness  Skin: No rashes or ulcers  Musculoskeletal: Denies muscle, joint, back pain      PHYSICAL EXAM:    VITALS:  /89   Pulse 129   Temp 98.4 °F (36.9 °C) (Oral)   Resp 20   Ht 5' 11\" (1.803 m)   Wt 255 lb (115.7 kg)   SpO2 99%   BMI 35.57 kg/m²      CONSTITUTIONAL: awake, alert, cooperative, no apparent distress  HEAD: atraumatic, normocephalic  EYES: sclera clear, pupils equal and reactive to light  ENT: ears are symmetric, nares patent, dry mucous membranes, NG tube in place  NECK: Supple, symmetrical, trachea midline  LUNGS: no respiratory distress, no audible wheezing  CARDIOVASCULAR: +S1/S2  ABDOMEN: Soft, nondistended during my exam, very mild focal tenderness to the left upper quadrant, non-peritoneal on exam, no abdominal scars noted, no guarding  MUSCULOSKELETAL: full range of motion noted, no gross abnormalities  NEUROLOGIC: No facial droop, no gross motor deficits noted  EXTREMITIES: no significant edema, cyanosis or clubbing  SKIN: normal coloration and turgor      CBC with Differential:    Lab Results   Component Value Date    WBC 16.6 08/31/2020    RBC 4.09 08/31/2020    HGB 10.9 08/31/2020    HCT 35.2 08/31/2020     08/31/2020    MCV 86.1 08/31/2020    MCH 26.7 08/31/2020    MCHC 31.0 08/31/2020    RDW 12.6 08/31/2020    LYMPHOPCT 10 08/31/2020    MONOPCT 7 08/31/2020 BASOPCT 0 08/31/2020    MONOSABS 1.11 08/31/2020    LYMPHSABS 1.71 08/31/2020    EOSABS <0.03 08/31/2020    BASOSABS 0.05 08/31/2020    DIFFTYPE NOT REPORTED 08/31/2020     BMP:    Lab Results   Component Value Date     08/31/2020    K 4.6 08/31/2020    CL 94 08/31/2020    CO2 24 08/31/2020    BUN 35 08/31/2020    LABALBU 3.6 08/31/2020    CREATININE 1.59 08/31/2020    CALCIUM 10.0 08/31/2020    GFRAA 54 08/31/2020    LABGLOM 44 08/31/2020    GLUCOSE 304 08/31/2020       Pertinent Radiology:     Ct Abdomen Pelvis Wo Contrast  Result Date: 8/31/2020  EXAMINATION: CT OF THE ABDOMEN AND PELVIS WITHOUT CONTRAST 8/31/2020 3:20 pm   1. Proximal small-bowel obstruction due to extension of an infiltrative lower pole left renal mass to the mesentery and adjacent small bowel. There is marked gastric distension. 2. Infiltrative lower pole left renal mass, presumed malignant measuring 4.8 x 6.9 x 9.9 cm. Nonobstructive left nephrolithiasis. 3. Trace free pelvic fluid. Findings were discussed with Imani Andrew at 3:37 pm on 8/31/2020. Xr Acute Abd Series Chest 1 Vw  Result Date: 8/31/2020  No acute cardiopulmonary disease. 5 x 11 mm calcification, possibly a renal or proximal ureteral calculus. Nonspecific bowel-gas pattern. ASSESSMENT     55 y/o male presents with a high-grade, proximal jejunal obstruction secondary to a large left renal mass invading/compressing. Patient known to Dr. Pedro Robertson and surgical resection was planned for mid September. CT of the abdomen pelvis was reviewed as a part of his surgical evaluation and there is no evidence of free fluid or pneumoperitoneum. Significant improvement in exam after gastric decompression. PLAN    · Keep n.p.o.  · IV fluid resuscitation -significant dehydration upon admission  · NG to low intermittent wall suction.   Please do not clamp or take off of suction as patient has fairly quickly reaccumulated gastric content after being taken off suction when leaving the ER and being admitted to the ICU. · Monitor output from NG  · Strict intake and output  · Medical management per medicine. Will defer insulin infusion/glucose management to their service. · Urology following. Planning for transfer to [facility] tomorrow when bed available.   We will continue to follow along closely and will coordinate timing for any operative exploration with Dr. Moe Haji at [facility].     -----------------------------------------------  Cooper Shock, DO  General Surgery Resident, PGY-5  8/31/2020 at 10:00 PM

## 2020-09-02 ENCOUNTER — ANESTHESIA EVENT (OUTPATIENT)
Dept: OPERATING ROOM | Age: 60
DRG: 853 | End: 2020-09-02
Payer: COMMERCIAL

## 2020-09-02 ENCOUNTER — ANESTHESIA (OUTPATIENT)
Dept: OPERATING ROOM | Age: 60
DRG: 853 | End: 2020-09-02
Payer: COMMERCIAL

## 2020-09-02 ENCOUNTER — APPOINTMENT (OUTPATIENT)
Dept: GENERAL RADIOLOGY | Age: 60
DRG: 853 | End: 2020-09-02
Attending: INTERNAL MEDICINE
Payer: COMMERCIAL

## 2020-09-02 VITALS
RESPIRATION RATE: 16 BRPM | DIASTOLIC BLOOD PRESSURE: 90 MMHG | OXYGEN SATURATION: 100 % | TEMPERATURE: 98.7 F | SYSTOLIC BLOOD PRESSURE: 134 MMHG

## 2020-09-02 LAB
-: ABNORMAL
ABSOLUTE EOS #: 0.03 K/UL (ref 0–0.44)
ABSOLUTE IMMATURE GRANULOCYTE: 0.1 K/UL (ref 0–0.3)
ABSOLUTE LYMPH #: 2.07 K/UL (ref 1.1–3.7)
ABSOLUTE MONO #: 1.4 K/UL (ref 0.1–1.2)
ALBUMIN SERPL-MCNC: 3.1 G/DL (ref 3.5–5.2)
ALBUMIN/GLOBULIN RATIO: 0.6 (ref 1–2.5)
ALLEN TEST: ABNORMAL
ALP BLD-CCNC: 92 U/L (ref 40–129)
ALT SERPL-CCNC: 24 U/L (ref 5–41)
AMORPHOUS: ABNORMAL
ANION GAP SERPL CALCULATED.3IONS-SCNC: 16 MMOL/L (ref 9–17)
AST SERPL-CCNC: 47 U/L
BACTERIA: ABNORMAL
BASOPHILS # BLD: 0 % (ref 0–2)
BASOPHILS ABSOLUTE: 0.05 K/UL (ref 0–0.2)
BILIRUB SERPL-MCNC: 0.43 MG/DL (ref 0.3–1.2)
BILIRUBIN DIRECT: 0.24 MG/DL
BILIRUBIN URINE: NEGATIVE
BILIRUBIN, INDIRECT: 0.19 MG/DL (ref 0–1)
BLOOD BANK SPECIMEN: NORMAL
BUN BLDV-MCNC: 33 MG/DL (ref 8–23)
BUN/CREAT BLD: ABNORMAL (ref 9–20)
CALCIUM IONIZED: 1.11 MMOL/L (ref 1.13–1.33)
CALCIUM SERPL-MCNC: 8.7 MG/DL (ref 8.6–10.4)
CASTS UA: ABNORMAL /LPF (ref 0–8)
CHLORIDE BLD-SCNC: 100 MMOL/L (ref 98–107)
CO2: 24 MMOL/L (ref 20–31)
COLOR: ABNORMAL
CREAT SERPL-MCNC: 1.58 MG/DL (ref 0.7–1.2)
CRYSTALS, UA: ABNORMAL /HPF
CULTURE: NO GROWTH
CULTURE: NORMAL
CULTURE: NORMAL
DIFFERENTIAL TYPE: ABNORMAL
EOSINOPHILS RELATIVE PERCENT: 0 % (ref 1–4)
EPITHELIAL CELLS UA: ABNORMAL /HPF (ref 0–5)
FIO2: 50
GFR AFRICAN AMERICAN: 54 ML/MIN
GFR NON-AFRICAN AMERICAN: 45 ML/MIN
GFR SERPL CREATININE-BSD FRML MDRD: ABNORMAL ML/MIN/{1.73_M2}
GFR SERPL CREATININE-BSD FRML MDRD: ABNORMAL ML/MIN/{1.73_M2}
GLOBULIN: ABNORMAL G/DL (ref 1.5–3.8)
GLUCOSE BLD-MCNC: 205 MG/DL (ref 75–110)
GLUCOSE BLD-MCNC: 209 MG/DL (ref 75–110)
GLUCOSE BLD-MCNC: 230 MG/DL (ref 75–110)
GLUCOSE BLD-MCNC: 256 MG/DL (ref 70–99)
GLUCOSE BLD-MCNC: 271 MG/DL (ref 75–110)
GLUCOSE URINE: NEGATIVE
HCT VFR BLD CALC: 33.6 % (ref 40.7–50.3)
HEMOGLOBIN: 10.4 G/DL (ref 13–17)
IMMATURE GRANULOCYTES: 1 %
KETONES, URINE: NEGATIVE
LACTIC ACID, SEPSIS WHOLE BLOOD: 3.2 MMOL/L (ref 0.5–1.9)
LACTIC ACID, SEPSIS: ABNORMAL MMOL/L (ref 0.5–1.9)
LEUKOCYTE ESTERASE, URINE: ABNORMAL
LYMPHOCYTES # BLD: 12 % (ref 24–43)
Lab: NORMAL
MAGNESIUM: 1.8 MG/DL (ref 1.6–2.6)
MCH RBC QN AUTO: 27 PG (ref 25.2–33.5)
MCHC RBC AUTO-ENTMCNC: 31 G/DL (ref 28.4–34.8)
MCV RBC AUTO: 87.3 FL (ref 82.6–102.9)
MODE: ABNORMAL
MONOCYTES # BLD: 8 % (ref 3–12)
MRSA, DNA, NASAL: NORMAL
MUCUS: ABNORMAL
NEGATIVE BASE EXCESS, ART: ABNORMAL (ref 0–2)
NITRITE, URINE: NEGATIVE
NRBC AUTOMATED: 0 PER 100 WBC
O2 DEVICE/FLOW/%: ABNORMAL
OTHER OBSERVATIONS UA: ABNORMAL
PATIENT TEMP: ABNORMAL
PDW BLD-RTO: 13 % (ref 11.8–14.4)
PH UA: 8.5 (ref 5–8)
PLATELET # BLD: 440 K/UL (ref 138–453)
PLATELET ESTIMATE: ABNORMAL
PMV BLD AUTO: 10.3 FL (ref 8.1–13.5)
POC HCO3: 23.7 MMOL/L (ref 21–28)
POC O2 SATURATION: 100 % (ref 94–98)
POC PCO2 TEMP: ABNORMAL MM HG
POC PCO2: 33.9 MM HG (ref 35–48)
POC PH TEMP: ABNORMAL
POC PH: 7.45 (ref 7.35–7.45)
POC PO2 TEMP: ABNORMAL MM HG
POC PO2: 224.7 MM HG (ref 83–108)
POSITIVE BASE EXCESS, ART: 0 (ref 0–3)
POTASSIUM SERPL-SCNC: 4.2 MMOL/L (ref 3.7–5.3)
PROTEIN UA: ABNORMAL
RBC # BLD: 3.85 M/UL (ref 4.21–5.77)
RBC # BLD: ABNORMAL 10*6/UL
RBC UA: ABNORMAL /HPF (ref 0–4)
RENAL EPITHELIAL, UA: ABNORMAL /HPF
SAMPLE SITE: ABNORMAL
SEG NEUTROPHILS: 79 % (ref 36–65)
SEGMENTED NEUTROPHILS ABSOLUTE COUNT: 13.16 K/UL (ref 1.5–8.1)
SODIUM BLD-SCNC: 140 MMOL/L (ref 135–144)
SPECIFIC GRAVITY UA: 1.02 (ref 1–1.03)
SPECIMEN DESCRIPTION: NORMAL
TCO2 (CALC), ART: 25 MMOL/L (ref 22–29)
TOTAL PROTEIN: 7.9 G/DL (ref 6.4–8.3)
TRICHOMONAS: ABNORMAL
TURBIDITY: ABNORMAL
URINE HGB: ABNORMAL
UROBILINOGEN, URINE: NORMAL
VANCOMYCIN TROUGH DATE LAST DOSE: NORMAL
VANCOMYCIN TROUGH DOSE AMOUNT: NORMAL
VANCOMYCIN TROUGH TIME LAST DOSE: NORMAL
VANCOMYCIN TROUGH: 14.3 UG/ML (ref 10–20)
WBC # BLD: 16.8 K/UL (ref 3.5–11.3)
WBC # BLD: ABNORMAL 10*3/UL
WBC UA: ABNORMAL /HPF (ref 0–5)
YEAST: ABNORMAL

## 2020-09-02 PROCEDURE — 2500000003 HC RX 250 WO HCPCS: Performed by: NURSE ANESTHETIST, CERTIFIED REGISTERED

## 2020-09-02 PROCEDURE — 86850 RBC ANTIBODY SCREEN: CPT

## 2020-09-02 PROCEDURE — 3600000009 HC SURGERY ROBOT BASE: Performed by: UROLOGY

## 2020-09-02 PROCEDURE — 8E0W4CZ ROBOTIC ASSISTED PROCEDURE OF TRUNK REGION, PERCUTANEOUS ENDOSCOPIC APPROACH: ICD-10-PCS | Performed by: UROLOGY

## 2020-09-02 PROCEDURE — 82803 BLOOD GASES ANY COMBINATION: CPT

## 2020-09-02 PROCEDURE — 2720000010 HC SURG SUPPLY STERILE: Performed by: UROLOGY

## 2020-09-02 PROCEDURE — 3700000000 HC ANESTHESIA ATTENDED CARE: Performed by: UROLOGY

## 2020-09-02 PROCEDURE — 2580000003 HC RX 258: Performed by: NURSE ANESTHETIST, CERTIFIED REGISTERED

## 2020-09-02 PROCEDURE — 81001 URINALYSIS AUTO W/SCOPE: CPT

## 2020-09-02 PROCEDURE — 0DH64UZ INSERTION OF FEEDING DEVICE INTO STOMACH, PERCUTANEOUS ENDOSCOPIC APPROACH: ICD-10-PCS | Performed by: UROLOGY

## 2020-09-02 PROCEDURE — 87040 BLOOD CULTURE FOR BACTERIA: CPT

## 2020-09-02 PROCEDURE — 6370000000 HC RX 637 (ALT 250 FOR IP): Performed by: STUDENT IN AN ORGANIZED HEALTH CARE EDUCATION/TRAINING PROGRAM

## 2020-09-02 PROCEDURE — 2709999900 HC NON-CHARGEABLE SUPPLY: Performed by: UROLOGY

## 2020-09-02 PROCEDURE — 2580000003 HC RX 258: Performed by: STUDENT IN AN ORGANIZED HEALTH CARE EDUCATION/TRAINING PROGRAM

## 2020-09-02 PROCEDURE — 2500000003 HC RX 250 WO HCPCS: Performed by: STUDENT IN AN ORGANIZED HEALTH CARE EDUCATION/TRAINING PROGRAM

## 2020-09-02 PROCEDURE — 74018 RADEX ABDOMEN 1 VIEW: CPT

## 2020-09-02 PROCEDURE — 37799 UNLISTED PX VASCULAR SURGERY: CPT

## 2020-09-02 PROCEDURE — 6360000002 HC RX W HCPCS: Performed by: NURSE ANESTHETIST, CERTIFIED REGISTERED

## 2020-09-02 PROCEDURE — 83605 ASSAY OF LACTIC ACID: CPT

## 2020-09-02 PROCEDURE — 5A1935Z RESPIRATORY VENTILATION, LESS THAN 24 CONSECUTIVE HOURS: ICD-10-PCS | Performed by: INTERNAL MEDICINE

## 2020-09-02 PROCEDURE — 85025 COMPLETE CBC W/AUTO DIFF WBC: CPT

## 2020-09-02 PROCEDURE — 0TT10ZZ RESECTION OF LEFT KIDNEY, OPEN APPROACH: ICD-10-PCS | Performed by: UROLOGY

## 2020-09-02 PROCEDURE — 80202 ASSAY OF VANCOMYCIN: CPT

## 2020-09-02 PROCEDURE — 2700000000 HC OXYGEN THERAPY PER DAY

## 2020-09-02 PROCEDURE — 0DBA0ZZ EXCISION OF JEJUNUM, OPEN APPROACH: ICD-10-PCS | Performed by: UROLOGY

## 2020-09-02 PROCEDURE — 6360000002 HC RX W HCPCS: Performed by: STUDENT IN AN ORGANIZED HEALTH CARE EDUCATION/TRAINING PROGRAM

## 2020-09-02 PROCEDURE — 2000000000 HC ICU R&B

## 2020-09-02 PROCEDURE — 94770 HC ETCO2 MONITOR DAILY: CPT

## 2020-09-02 PROCEDURE — 82947 ASSAY GLUCOSE BLOOD QUANT: CPT

## 2020-09-02 PROCEDURE — 3700000001 HC ADD 15 MINUTES (ANESTHESIA): Performed by: UROLOGY

## 2020-09-02 PROCEDURE — 86920 COMPATIBILITY TEST SPIN: CPT

## 2020-09-02 PROCEDURE — 80048 BASIC METABOLIC PNL TOTAL CA: CPT

## 2020-09-02 PROCEDURE — 94002 VENT MGMT INPAT INIT DAY: CPT

## 2020-09-02 PROCEDURE — 36415 COLL VENOUS BLD VENIPUNCTURE: CPT

## 2020-09-02 PROCEDURE — 86901 BLOOD TYPING SEROLOGIC RH(D): CPT

## 2020-09-02 PROCEDURE — 83735 ASSAY OF MAGNESIUM: CPT

## 2020-09-02 PROCEDURE — 86900 BLOOD TYPING SEROLOGIC ABO: CPT

## 2020-09-02 PROCEDURE — 99291 CRITICAL CARE FIRST HOUR: CPT | Performed by: INTERNAL MEDICINE

## 2020-09-02 PROCEDURE — 88307 TISSUE EXAM BY PATHOLOGIST: CPT

## 2020-09-02 PROCEDURE — 94761 N-INVAS EAR/PLS OXIMETRY MLT: CPT

## 2020-09-02 PROCEDURE — 2580000003 HC RX 258: Performed by: SURGERY

## 2020-09-02 PROCEDURE — S2900 ROBOTIC SURGICAL SYSTEM: HCPCS | Performed by: UROLOGY

## 2020-09-02 PROCEDURE — 6360000002 HC RX W HCPCS

## 2020-09-02 PROCEDURE — 2580000003 HC RX 258: Performed by: UROLOGY

## 2020-09-02 PROCEDURE — 3600000019 HC SURGERY ROBOT ADDTL 15MIN: Performed by: UROLOGY

## 2020-09-02 PROCEDURE — 87086 URINE CULTURE/COLONY COUNT: CPT

## 2020-09-02 RX ORDER — ROCURONIUM BROMIDE 10 MG/ML
INJECTION, SOLUTION INTRAVENOUS PRN
Status: DISCONTINUED | OUTPATIENT
Start: 2020-09-02 | End: 2020-09-02 | Stop reason: SDUPTHER

## 2020-09-02 RX ORDER — PROPOFOL 10 MG/ML
10 INJECTION, EMULSION INTRAVENOUS
Status: DISCONTINUED | OUTPATIENT
Start: 2020-09-02 | End: 2020-09-04

## 2020-09-02 RX ORDER — SODIUM CHLORIDE 0.9 % (FLUSH) 0.9 %
10 SYRINGE (ML) INJECTION EVERY 12 HOURS SCHEDULED
Status: DISCONTINUED | OUTPATIENT
Start: 2020-09-02 | End: 2020-09-11 | Stop reason: HOSPADM

## 2020-09-02 RX ORDER — METOPROLOL TARTRATE 5 MG/5ML
INJECTION INTRAVENOUS PRN
Status: DISCONTINUED | OUTPATIENT
Start: 2020-09-02 | End: 2020-09-02 | Stop reason: SDUPTHER

## 2020-09-02 RX ORDER — METOPROLOL TARTRATE 5 MG/5ML
5 INJECTION INTRAVENOUS EVERY 6 HOURS PRN
Status: DISCONTINUED | OUTPATIENT
Start: 2020-09-02 | End: 2020-09-02

## 2020-09-02 RX ORDER — DEXTROSE MONOHYDRATE 25 G/50ML
12.5 INJECTION, SOLUTION INTRAVENOUS PRN
Status: DISCONTINUED | OUTPATIENT
Start: 2020-09-02 | End: 2020-09-08 | Stop reason: SDUPTHER

## 2020-09-02 RX ORDER — FENTANYL CITRATE 50 UG/ML
75 INJECTION, SOLUTION INTRAMUSCULAR; INTRAVENOUS
Status: DISCONTINUED | OUTPATIENT
Start: 2020-09-02 | End: 2020-09-02

## 2020-09-02 RX ORDER — 0.9 % SODIUM CHLORIDE 0.9 %
500 INTRAVENOUS SOLUTION INTRAVENOUS ONCE
Status: COMPLETED | OUTPATIENT
Start: 2020-09-02 | End: 2020-09-02

## 2020-09-02 RX ORDER — HEPARIN SODIUM 5000 [USP'U]/ML
5000 INJECTION, SOLUTION INTRAVENOUS; SUBCUTANEOUS EVERY 8 HOURS SCHEDULED
Status: DISCONTINUED | OUTPATIENT
Start: 2020-09-03 | End: 2020-09-11 | Stop reason: HOSPADM

## 2020-09-02 RX ORDER — LIDOCAINE HYDROCHLORIDE 10 MG/ML
5 INJECTION, SOLUTION INFILTRATION; PERINEURAL ONCE
Status: DISCONTINUED | OUTPATIENT
Start: 2020-09-02 | End: 2020-09-03

## 2020-09-02 RX ORDER — PROPOFOL 10 MG/ML
INJECTION, EMULSION INTRAVENOUS PRN
Status: DISCONTINUED | OUTPATIENT
Start: 2020-09-02 | End: 2020-09-02 | Stop reason: SDUPTHER

## 2020-09-02 RX ORDER — INSULIN GLARGINE 100 [IU]/ML
20 INJECTION, SOLUTION SUBCUTANEOUS ONCE
Status: COMPLETED | OUTPATIENT
Start: 2020-09-02 | End: 2020-09-02

## 2020-09-02 RX ORDER — SODIUM CHLORIDE, SODIUM LACTATE, POTASSIUM CHLORIDE, CALCIUM CHLORIDE 600; 310; 30; 20 MG/100ML; MG/100ML; MG/100ML; MG/100ML
INJECTION, SOLUTION INTRAVENOUS CONTINUOUS PRN
Status: DISCONTINUED | OUTPATIENT
Start: 2020-09-02 | End: 2020-09-02 | Stop reason: SDUPTHER

## 2020-09-02 RX ORDER — SUCCINYLCHOLINE/SOD CL,ISO/PF 100 MG/5ML
SYRINGE (ML) INTRAVENOUS PRN
Status: DISCONTINUED | OUTPATIENT
Start: 2020-09-02 | End: 2020-09-02 | Stop reason: SDUPTHER

## 2020-09-02 RX ORDER — MORPHINE SULFATE 2 MG/ML
2 INJECTION, SOLUTION INTRAMUSCULAR; INTRAVENOUS EVERY 4 HOURS PRN
Status: DISCONTINUED | OUTPATIENT
Start: 2020-09-02 | End: 2020-09-04

## 2020-09-02 RX ORDER — ONDANSETRON 2 MG/ML
INJECTION INTRAMUSCULAR; INTRAVENOUS PRN
Status: DISCONTINUED | OUTPATIENT
Start: 2020-09-02 | End: 2020-09-02 | Stop reason: SDUPTHER

## 2020-09-02 RX ORDER — DIPHENHYDRAMINE HYDROCHLORIDE 50 MG/ML
INJECTION INTRAMUSCULAR; INTRAVENOUS PRN
Status: DISCONTINUED | OUTPATIENT
Start: 2020-09-02 | End: 2020-09-02 | Stop reason: SDUPTHER

## 2020-09-02 RX ORDER — 0.9 % SODIUM CHLORIDE 0.9 %
1000 INTRAVENOUS SOLUTION INTRAVENOUS ONCE
Status: DISCONTINUED | OUTPATIENT
Start: 2020-09-02 | End: 2020-09-02

## 2020-09-02 RX ORDER — HEPARIN SODIUM 5000 [USP'U]/ML
5000 INJECTION, SOLUTION INTRAVENOUS; SUBCUTANEOUS EVERY 8 HOURS SCHEDULED
Status: DISCONTINUED | OUTPATIENT
Start: 2020-09-02 | End: 2020-09-02

## 2020-09-02 RX ORDER — MAGNESIUM HYDROXIDE 1200 MG/15ML
LIQUID ORAL CONTINUOUS PRN
Status: COMPLETED | OUTPATIENT
Start: 2020-09-02 | End: 2020-09-02

## 2020-09-02 RX ORDER — DEXAMETHASONE SODIUM PHOSPHATE 4 MG/ML
INJECTION, SOLUTION INTRA-ARTICULAR; INTRALESIONAL; INTRAMUSCULAR; INTRAVENOUS; SOFT TISSUE PRN
Status: DISCONTINUED | OUTPATIENT
Start: 2020-09-02 | End: 2020-09-02 | Stop reason: SDUPTHER

## 2020-09-02 RX ORDER — VANCOMYCIN HYDROCHLORIDE 1 G/200ML
1000 INJECTION, SOLUTION INTRAVENOUS ONCE
Status: COMPLETED | OUTPATIENT
Start: 2020-09-02 | End: 2020-09-02

## 2020-09-02 RX ORDER — MAGNESIUM HYDROXIDE 1200 MG/15ML
LIQUID ORAL PRN
Status: DISCONTINUED | OUTPATIENT
Start: 2020-09-02 | End: 2020-09-02 | Stop reason: HOSPADM

## 2020-09-02 RX ORDER — NICOTINE POLACRILEX 4 MG
15 LOZENGE BUCCAL PRN
Status: DISCONTINUED | OUTPATIENT
Start: 2020-09-02 | End: 2020-09-08 | Stop reason: SDUPTHER

## 2020-09-02 RX ORDER — ESMOLOL HYDROCHLORIDE 10 MG/ML
INJECTION INTRAVENOUS PRN
Status: DISCONTINUED | OUTPATIENT
Start: 2020-09-02 | End: 2020-09-02 | Stop reason: SDUPTHER

## 2020-09-02 RX ORDER — 0.9 % SODIUM CHLORIDE 0.9 %
500 INTRAVENOUS SOLUTION INTRAVENOUS ONCE
Status: DISCONTINUED | OUTPATIENT
Start: 2020-09-02 | End: 2020-09-02

## 2020-09-02 RX ORDER — MORPHINE SULFATE 4 MG/ML
4 INJECTION, SOLUTION INTRAMUSCULAR; INTRAVENOUS EVERY 4 HOURS PRN
Status: DISCONTINUED | OUTPATIENT
Start: 2020-09-02 | End: 2020-09-04

## 2020-09-02 RX ORDER — DEXTROSE MONOHYDRATE 50 MG/ML
100 INJECTION, SOLUTION INTRAVENOUS PRN
Status: DISCONTINUED | OUTPATIENT
Start: 2020-09-02 | End: 2020-09-08 | Stop reason: SDUPTHER

## 2020-09-02 RX ORDER — SODIUM CHLORIDE 0.9 % (FLUSH) 0.9 %
10 SYRINGE (ML) INJECTION PRN
Status: DISCONTINUED | OUTPATIENT
Start: 2020-09-02 | End: 2020-09-11 | Stop reason: HOSPADM

## 2020-09-02 RX ORDER — FENTANYL CITRATE 50 UG/ML
INJECTION, SOLUTION INTRAMUSCULAR; INTRAVENOUS
Status: COMPLETED
Start: 2020-09-02 | End: 2020-09-02

## 2020-09-02 RX ORDER — LIDOCAINE HYDROCHLORIDE 10 MG/ML
INJECTION, SOLUTION EPIDURAL; INFILTRATION; INTRACAUDAL; PERINEURAL PRN
Status: DISCONTINUED | OUTPATIENT
Start: 2020-09-02 | End: 2020-09-02 | Stop reason: SDUPTHER

## 2020-09-02 RX ORDER — PROPOFOL 10 MG/ML
INJECTION, EMULSION INTRAVENOUS
Status: COMPLETED
Start: 2020-09-02 | End: 2020-09-02

## 2020-09-02 RX ORDER — METOPROLOL TARTRATE 5 MG/5ML
2.5 INJECTION INTRAVENOUS EVERY 8 HOURS
Status: DISCONTINUED | OUTPATIENT
Start: 2020-09-02 | End: 2020-09-03

## 2020-09-02 RX ORDER — FENTANYL CITRATE 50 UG/ML
100 INJECTION, SOLUTION INTRAMUSCULAR; INTRAVENOUS
Status: DISCONTINUED | OUTPATIENT
Start: 2020-09-02 | End: 2020-09-05

## 2020-09-02 RX ORDER — 0.9 % SODIUM CHLORIDE 0.9 %
1000 INTRAVENOUS SOLUTION INTRAVENOUS ONCE
Status: COMPLETED | OUTPATIENT
Start: 2020-09-02 | End: 2020-09-02

## 2020-09-02 RX ORDER — FENTANYL CITRATE 50 UG/ML
INJECTION, SOLUTION INTRAMUSCULAR; INTRAVENOUS PRN
Status: DISCONTINUED | OUTPATIENT
Start: 2020-09-02 | End: 2020-09-02 | Stop reason: SDUPTHER

## 2020-09-02 RX ADMIN — METRONIDAZOLE 500 MG: 500 INJECTION, SOLUTION INTRAVENOUS at 14:49

## 2020-09-02 RX ADMIN — FENTANYL CITRATE 50 MCG: 50 INJECTION, SOLUTION INTRAMUSCULAR; INTRAVENOUS at 15:18

## 2020-09-02 RX ADMIN — PHENYLEPHRINE HYDROCHLORIDE 50 MCG: 10 INJECTION INTRAVENOUS at 16:16

## 2020-09-02 RX ADMIN — PROPOFOL 10 MCG/KG/MIN: 10 INJECTION, EMULSION INTRAVENOUS at 18:25

## 2020-09-02 RX ADMIN — VANCOMYCIN HYDROCHLORIDE 1000 MG: 1 INJECTION, SOLUTION INTRAVENOUS at 02:22

## 2020-09-02 RX ADMIN — CEFEPIME 2 G: 2 INJECTION, POWDER, FOR SOLUTION INTRAVENOUS at 02:25

## 2020-09-02 RX ADMIN — ROCURONIUM BROMIDE 10 MG: 10 INJECTION INTRAVENOUS at 13:28

## 2020-09-02 RX ADMIN — ESMOLOL HYDROCHLORIDE 5 MG: 10 INJECTION, SOLUTION INTRAVENOUS at 14:25

## 2020-09-02 RX ADMIN — ROCURONIUM BROMIDE 20 MG: 10 INJECTION INTRAVENOUS at 16:41

## 2020-09-02 RX ADMIN — INSULIN LISPRO 5 UNITS: 100 INJECTION, SOLUTION INTRAVENOUS; SUBCUTANEOUS at 20:19

## 2020-09-02 RX ADMIN — SODIUM CHLORIDE 500 ML: 9 INJECTION, SOLUTION INTRAVENOUS at 05:40

## 2020-09-02 RX ADMIN — ONDANSETRON 4 MG: 2 INJECTION, SOLUTION INTRAMUSCULAR; INTRAVENOUS at 17:20

## 2020-09-02 RX ADMIN — FAMOTIDINE 20 MG: 10 INJECTION INTRAVENOUS at 08:33

## 2020-09-02 RX ADMIN — INSULIN LISPRO 2 UNITS: 100 INJECTION, SOLUTION INTRAVENOUS; SUBCUTANEOUS at 00:38

## 2020-09-02 RX ADMIN — FAMOTIDINE 20 MG: 10 INJECTION INTRAVENOUS at 20:19

## 2020-09-02 RX ADMIN — PROPOFOL 50 MG: 10 INJECTION, EMULSION INTRAVENOUS at 15:18

## 2020-09-02 RX ADMIN — CEFEPIME 2 G: 2 INJECTION, POWDER, FOR SOLUTION INTRAVENOUS at 13:42

## 2020-09-02 RX ADMIN — LIDOCAINE HYDROCHLORIDE 50 MG: 10 INJECTION, SOLUTION EPIDURAL; INFILTRATION; INTRACAUDAL; PERINEURAL at 13:28

## 2020-09-02 RX ADMIN — ROCURONIUM BROMIDE 30 MG: 10 INJECTION INTRAVENOUS at 17:14

## 2020-09-02 RX ADMIN — SODIUM CHLORIDE, PRESERVATIVE FREE 10 ML: 5 INJECTION INTRAVENOUS at 00:05

## 2020-09-02 RX ADMIN — SODIUM CHLORIDE, POTASSIUM CHLORIDE, SODIUM LACTATE AND CALCIUM CHLORIDE: 600; 310; 30; 20 INJECTION, SOLUTION INTRAVENOUS at 15:55

## 2020-09-02 RX ADMIN — METOPROLOL TARTRATE 1 MG: 1 INJECTION, SOLUTION INTRAVENOUS at 14:57

## 2020-09-02 RX ADMIN — FENTANYL CITRATE 50 MCG: 50 INJECTION, SOLUTION INTRAMUSCULAR; INTRAVENOUS at 13:54

## 2020-09-02 RX ADMIN — FENTANYL CITRATE 75 MCG: 50 INJECTION, SOLUTION INTRAMUSCULAR; INTRAVENOUS at 19:09

## 2020-09-02 RX ADMIN — METOPROLOL TARTRATE 2.5 MG: 1 INJECTION, SOLUTION INTRAVENOUS at 22:32

## 2020-09-02 RX ADMIN — METOPROLOL TARTRATE 1 MG: 1 INJECTION, SOLUTION INTRAVENOUS at 16:29

## 2020-09-02 RX ADMIN — ESMOLOL HYDROCHLORIDE 10 MG: 10 INJECTION, SOLUTION INTRAVENOUS at 14:36

## 2020-09-02 RX ADMIN — PROPOFOL 200 MG: 10 INJECTION, EMULSION INTRAVENOUS at 13:28

## 2020-09-02 RX ADMIN — Medication 100 MG: at 13:28

## 2020-09-02 RX ADMIN — PROPOFOL 40 MCG/KG/MIN: 10 INJECTION, EMULSION INTRAVENOUS at 20:55

## 2020-09-02 RX ADMIN — FAMOTIDINE 20 MG: 10 INJECTION INTRAVENOUS at 00:05

## 2020-09-02 RX ADMIN — HYDROMORPHONE HYDROCHLORIDE 0.5 MG: 1 INJECTION, SOLUTION INTRAMUSCULAR; INTRAVENOUS; SUBCUTANEOUS at 16:04

## 2020-09-02 RX ADMIN — METRONIDAZOLE 500 MG: 500 INJECTION, SOLUTION INTRAVENOUS at 19:57

## 2020-09-02 RX ADMIN — ROCURONIUM BROMIDE 40 MG: 10 INJECTION INTRAVENOUS at 13:38

## 2020-09-02 RX ADMIN — ROCURONIUM BROMIDE 10 MG: 10 INJECTION INTRAVENOUS at 15:22

## 2020-09-02 RX ADMIN — SODIUM CHLORIDE, POTASSIUM CHLORIDE, SODIUM LACTATE AND CALCIUM CHLORIDE: 600; 310; 30; 20 INJECTION, SOLUTION INTRAVENOUS at 14:23

## 2020-09-02 RX ADMIN — FENTANYL CITRATE 100 MCG: 50 INJECTION, SOLUTION INTRAMUSCULAR; INTRAVENOUS at 21:38

## 2020-09-02 RX ADMIN — ESMOLOL HYDROCHLORIDE 5 MG: 10 INJECTION, SOLUTION INTRAVENOUS at 14:31

## 2020-09-02 RX ADMIN — PHENYLEPHRINE HYDROCHLORIDE 50 MCG: 10 INJECTION INTRAVENOUS at 16:17

## 2020-09-02 RX ADMIN — FENTANYL CITRATE 100 MCG: 50 INJECTION, SOLUTION INTRAMUSCULAR; INTRAVENOUS at 13:39

## 2020-09-02 RX ADMIN — ROCURONIUM BROMIDE 10 MG: 10 INJECTION INTRAVENOUS at 14:02

## 2020-09-02 RX ADMIN — HYDROMORPHONE HYDROCHLORIDE 0.5 MG: 1 INJECTION, SOLUTION INTRAMUSCULAR; INTRAVENOUS; SUBCUTANEOUS at 16:00

## 2020-09-02 RX ADMIN — SODIUM CHLORIDE: 9 INJECTION, SOLUTION INTRAVENOUS at 10:50

## 2020-09-02 RX ADMIN — FENTANYL CITRATE 50 MCG: 50 INJECTION, SOLUTION INTRAMUSCULAR; INTRAVENOUS at 14:35

## 2020-09-02 RX ADMIN — ROCURONIUM BROMIDE 10 MG: 10 INJECTION INTRAVENOUS at 15:57

## 2020-09-02 RX ADMIN — INSULIN LISPRO 3 UNITS: 100 INJECTION, SOLUTION INTRAVENOUS; SUBCUTANEOUS at 08:42

## 2020-09-02 RX ADMIN — SODIUM CHLORIDE, POTASSIUM CHLORIDE, SODIUM LACTATE AND CALCIUM CHLORIDE: 600; 310; 30; 20 INJECTION, SOLUTION INTRAVENOUS at 13:16

## 2020-09-02 RX ADMIN — SODIUM CHLORIDE 500 ML: 9 INJECTION, SOLUTION INTRAVENOUS at 22:41

## 2020-09-02 RX ADMIN — INSULIN GLARGINE 20 UNITS: 100 INJECTION, SOLUTION SUBCUTANEOUS at 08:39

## 2020-09-02 RX ADMIN — Medication 12.5 MG: at 14:08

## 2020-09-02 RX ADMIN — MORPHINE SULFATE 4 MG: 4 INJECTION, SOLUTION INTRAMUSCULAR; INTRAVENOUS at 18:08

## 2020-09-02 RX ADMIN — Medication 1750 MG: at 22:33

## 2020-09-02 RX ADMIN — FENTANYL CITRATE 50 MCG: 50 INJECTION, SOLUTION INTRAMUSCULAR; INTRAVENOUS at 13:57

## 2020-09-02 RX ADMIN — PROPOFOL 40 MG: 10 INJECTION, EMULSION INTRAVENOUS at 13:56

## 2020-09-02 RX ADMIN — SODIUM CHLORIDE 1000 ML: 9 INJECTION, SOLUTION INTRAVENOUS at 00:27

## 2020-09-02 RX ADMIN — SODIUM CHLORIDE: 9 INJECTION, SOLUTION INTRAVENOUS at 00:04

## 2020-09-02 RX ADMIN — METOPROLOL TARTRATE 1 MG: 1 INJECTION, SOLUTION INTRAVENOUS at 14:41

## 2020-09-02 RX ADMIN — HEPARIN SODIUM 5000 UNITS: 5000 INJECTION, SOLUTION INTRAVENOUS; SUBCUTANEOUS at 05:46

## 2020-09-02 RX ADMIN — ROCURONIUM BROMIDE 20 MG: 10 INJECTION INTRAVENOUS at 14:41

## 2020-09-02 RX ADMIN — FENTANYL CITRATE 100 MCG: 50 INJECTION, SOLUTION INTRAMUSCULAR; INTRAVENOUS at 23:20

## 2020-09-02 RX ADMIN — FENTANYL CITRATE 100 MCG: 50 INJECTION, SOLUTION INTRAMUSCULAR; INTRAVENOUS at 20:17

## 2020-09-02 RX ADMIN — DEXAMETHASONE SODIUM PHOSPHATE 4 MG: 4 INJECTION, SOLUTION INTRAMUSCULAR; INTRAVENOUS at 14:03

## 2020-09-02 RX ADMIN — FENTANYL CITRATE 50 MCG: 50 INJECTION, SOLUTION INTRAMUSCULAR; INTRAVENOUS at 14:01

## 2020-09-02 RX ADMIN — PROPOFOL 60 MG: 10 INJECTION, EMULSION INTRAVENOUS at 13:57

## 2020-09-02 ASSESSMENT — PULMONARY FUNCTION TESTS
PIF_VALUE: 19
PIF_VALUE: 17
PIF_VALUE: 20
PIF_VALUE: 0
PIF_VALUE: 19
PIF_VALUE: 21
PIF_VALUE: 19
PIF_VALUE: 18
PIF_VALUE: 24
PIF_VALUE: 25
PIF_VALUE: 26
PIF_VALUE: 19
PIF_VALUE: 27
PIF_VALUE: 18
PIF_VALUE: 24
PIF_VALUE: 18
PIF_VALUE: 19
PIF_VALUE: 26
PIF_VALUE: 19
PIF_VALUE: 19
PIF_VALUE: 27
PIF_VALUE: 18
PIF_VALUE: 24
PIF_VALUE: 25
PIF_VALUE: 20
PIF_VALUE: 17
PIF_VALUE: 25
PIF_VALUE: 0
PIF_VALUE: 20
PIF_VALUE: 20
PIF_VALUE: 26
PIF_VALUE: 1
PIF_VALUE: 20
PIF_VALUE: 24
PIF_VALUE: 17
PIF_VALUE: 17
PIF_VALUE: 20
PIF_VALUE: 20
PIF_VALUE: 27
PIF_VALUE: 26
PIF_VALUE: 26
PIF_VALUE: 19
PIF_VALUE: 20
PIF_VALUE: 19
PIF_VALUE: 19
PIF_VALUE: 25
PIF_VALUE: 18
PIF_VALUE: 20
PIF_VALUE: 19
PIF_VALUE: 25
PIF_VALUE: 27
PIF_VALUE: 20
PIF_VALUE: 25
PIF_VALUE: 20
PIF_VALUE: 18
PIF_VALUE: 25
PIF_VALUE: 19
PIF_VALUE: 15
PIF_VALUE: 26
PIF_VALUE: 26
PIF_VALUE: 19
PIF_VALUE: 18
PIF_VALUE: 0
PIF_VALUE: 26
PIF_VALUE: 25
PIF_VALUE: 18
PIF_VALUE: 25
PIF_VALUE: 19
PIF_VALUE: 26
PIF_VALUE: 18
PIF_VALUE: 19
PIF_VALUE: 26
PIF_VALUE: 20
PIF_VALUE: 19
PIF_VALUE: 19
PIF_VALUE: 25
PIF_VALUE: 18
PIF_VALUE: 25
PIF_VALUE: 25
PIF_VALUE: 20
PIF_VALUE: 20
PIF_VALUE: 18
PIF_VALUE: 18
PIF_VALUE: 19
PIF_VALUE: 26
PIF_VALUE: 17
PIF_VALUE: 18
PIF_VALUE: 20
PIF_VALUE: 25
PIF_VALUE: 19
PIF_VALUE: 26
PIF_VALUE: 25
PIF_VALUE: 17
PIF_VALUE: 19
PIF_VALUE: 19
PIF_VALUE: 18
PIF_VALUE: 26
PIF_VALUE: 25
PIF_VALUE: 1
PIF_VALUE: 26
PIF_VALUE: 26
PIF_VALUE: 17
PIF_VALUE: 19
PIF_VALUE: 25
PIF_VALUE: 20
PIF_VALUE: 26
PIF_VALUE: 25
PIF_VALUE: 20
PIF_VALUE: 19
PIF_VALUE: 19
PIF_VALUE: 25
PIF_VALUE: 18
PIF_VALUE: 25
PIF_VALUE: 27
PIF_VALUE: 20
PIF_VALUE: 25
PIF_VALUE: 26
PIF_VALUE: 20
PIF_VALUE: 25
PIF_VALUE: 20
PIF_VALUE: 19
PIF_VALUE: 20
PIF_VALUE: 20
PIF_VALUE: 25
PIF_VALUE: 19
PIF_VALUE: 19
PIF_VALUE: 0
PIF_VALUE: 21
PIF_VALUE: 19
PIF_VALUE: 25
PIF_VALUE: 1
PIF_VALUE: 20
PIF_VALUE: 19
PIF_VALUE: 16
PIF_VALUE: 17
PIF_VALUE: 28
PIF_VALUE: 26
PIF_VALUE: 19
PIF_VALUE: 17
PIF_VALUE: 0
PIF_VALUE: 25
PIF_VALUE: 26
PIF_VALUE: 19
PIF_VALUE: 24
PIF_VALUE: 19
PIF_VALUE: 26
PIF_VALUE: 17
PIF_VALUE: 24
PIF_VALUE: 19
PIF_VALUE: 19
PIF_VALUE: 22
PIF_VALUE: 19
PIF_VALUE: 18
PIF_VALUE: 19
PIF_VALUE: 16
PIF_VALUE: 25
PIF_VALUE: 18
PIF_VALUE: 20
PIF_VALUE: 25
PIF_VALUE: 20
PIF_VALUE: 26
PIF_VALUE: 19
PIF_VALUE: 26
PIF_VALUE: 26
PIF_VALUE: 19
PIF_VALUE: 19
PIF_VALUE: 20
PIF_VALUE: 23
PIF_VALUE: 25
PIF_VALUE: 17
PIF_VALUE: 20
PIF_VALUE: 25
PIF_VALUE: 19
PIF_VALUE: 1
PIF_VALUE: 19
PIF_VALUE: 18
PIF_VALUE: 25
PIF_VALUE: 20
PIF_VALUE: 19
PIF_VALUE: 25
PIF_VALUE: 25
PIF_VALUE: 19
PIF_VALUE: 25
PIF_VALUE: 16
PIF_VALUE: 0
PIF_VALUE: 26
PIF_VALUE: 25
PIF_VALUE: 25
PIF_VALUE: 20
PIF_VALUE: 25
PIF_VALUE: 19
PIF_VALUE: 25
PIF_VALUE: 19
PIF_VALUE: 20
PIF_VALUE: 25
PIF_VALUE: 25
PIF_VALUE: 18
PIF_VALUE: 20
PIF_VALUE: 26
PIF_VALUE: 17
PIF_VALUE: 26
PIF_VALUE: 25
PIF_VALUE: 20
PIF_VALUE: 20
PIF_VALUE: 19
PIF_VALUE: 25
PIF_VALUE: 1
PIF_VALUE: 25
PIF_VALUE: 19
PIF_VALUE: 19
PIF_VALUE: 26
PIF_VALUE: 25
PIF_VALUE: 19
PIF_VALUE: 25
PIF_VALUE: 24
PIF_VALUE: 19
PIF_VALUE: 19
PIF_VALUE: 25
PIF_VALUE: 26
PIF_VALUE: 19
PIF_VALUE: 19
PIF_VALUE: 25
PIF_VALUE: 19
PIF_VALUE: 18
PIF_VALUE: 26
PIF_VALUE: 17
PIF_VALUE: 19
PIF_VALUE: 24
PIF_VALUE: 27
PIF_VALUE: 18
PIF_VALUE: 20
PIF_VALUE: 25
PIF_VALUE: 19
PIF_VALUE: 25
PIF_VALUE: 19
PIF_VALUE: 17
PIF_VALUE: 19
PIF_VALUE: 1
PIF_VALUE: 26
PIF_VALUE: 19
PIF_VALUE: 26
PIF_VALUE: 25
PIF_VALUE: 19
PIF_VALUE: 27
PIF_VALUE: 20

## 2020-09-02 ASSESSMENT — PAIN SCALES - GENERAL
PAINLEVEL_OUTOF10: 0
PAINLEVEL_OUTOF10: 0
PAINLEVEL_OUTOF10: 3
PAINLEVEL_OUTOF10: 10

## 2020-09-02 ASSESSMENT — ENCOUNTER SYMPTOMS
COUGH: 0
SINUS PAIN: 0
ABDOMINAL PAIN: 1
SHORTNESS OF BREATH: 0
CONSTIPATION: 0
WHEEZING: 0
RECTAL PAIN: 0
SHORTNESS OF BREATH: 0
VOICE CHANGE: 0
VOMITING: 0
NAUSEA: 0
SINUS PRESSURE: 0
BLOOD IN STOOL: 0
ABDOMINAL DISTENTION: 0
COLOR CHANGE: 0

## 2020-09-02 ASSESSMENT — PAIN - FUNCTIONAL ASSESSMENT: PAIN_FUNCTIONAL_ASSESSMENT: 0-10

## 2020-09-02 NOTE — PROGRESS NOTES
[facility]  Occupational Therapy Not Seen Note    DATE: 2020  Name: Mark Ballard II  : 1960  MRN: [id number]    Patient not available for Occupational Therapy due to:    [] Testing:    [] Hemodialysis    [] Blood Transfusion in Progress    []Refusal by Patient:    [x] Surgery/Procedure: Pt going to OR today for nephrectomy and exploratory laparoscopy, per RN.     [] Strict Bedrest    [] Sedation    [] Spine Precautions     [] Pt being transferred to palliative care at this time. Spoke with pt/family and OT services to be defered. [] Pt independent with functional mobility and functional tasks. Pt with no OT acute care needs at this time, will defer OT eval.    [] Other    Next Scheduled Treatment: Ck 9/3, as appropriate.      Fahad Fraser, OT/S

## 2020-09-02 NOTE — OP NOTE
Operative Note      Patient: Jan Casiano II  YOB: 1960  MRN: [id number]    Date of Procedure: 9/2/2020    Pre-Op Diagnosis: LEFT RENAL MASS    Post-Op Diagnosis: Same with left perirenal mass extending into sigmoid colon and proximal jejunal mesentery causing obstruction       Procedure(s):  LAPAROSCOPIC XI ROBOTIC NEPHRECTOMY  EXPLORATORY LAPAROTOMY BOWEL RESECTION of proximal jejunum and descending colon with mobilization of splenic flexure and primary anastomosis of small bowel and colon, placement of gastrostomy tube    Surgeon: Jacinta Vela  Co-surgeon: Radha    Assistant:    Assistant: Tara Duty  Resident: Corrine Boeck, MD    Anesthesia: General    Estimated Blood Loss (mL): 482OO    Complications: None    Specimens:   ID Type Source Tests Collected by Time Destination   A : LEFT KIDNEY AND MASS Tissue Kidney SURGICAL PATHOLOGY Lisa Laureano MD 9/2/2020 1620    B : SMALL BOWEL, COLON AND NECROTIC ABCESS CAVITY Tissue Small Intestine SURGICAL PATHOLOGY Jillian Chávez DO 9/2/2020 1700        Implants:  none      Drains:   Gastrostomy/Enterostomy/Jejunostomy Gastrostomy LUQ 20 fr (Active)       Urethral Catheter Double-lumen 16 fr (Active)   Catheter Indications Urology/Urologist seeing this patient or inserted indwelling catheter 09/02/20 0800   Site Assessment No urethral drainage 09/02/20 0800   Urine Color Pink 09/02/20 0800   Urine Appearance Sediment 09/02/20 0800   Output (mL) 200 mL 09/02/20 1000   Provider Notified to Remove No 09/01/20 2208       [REMOVED] NG/OG/NJ/NE Tube Nasogastric 18 fr Left nostril (Removed)   Surrounding Skin Dry; Intact 09/01/20 2000   Securement device Yes 09/02/20 0800   Status Suction-low intermittent 09/02/20 0800   Placement Verified by Respiratory Status;by External Catheter Length;by Gastric Contents 09/02/20 0800   NG/OG/NJ/NE External Measurement (cm) 67 cm 09/02/20 0800   Drainage Appearance Brown;Green 09/02/20 0800   Output (mL) 50 ml 09/02/20 0800       [REMOVED] Urethral Catheter 16 fr (Removed)   Catheter Indications Need for fluid management in critically ill patients in a critical care setting not able to be managed by other means such as BSC with hat, bedpan, urinal, condom catheter, or short term intermittent urethral catherization 09/01/20 2000   Site Assessment No urethral drainage 09/01/20 2000   Urine Color Yellow 09/01/20 2000   Urine Appearance Clear 09/01/20 2000   Output (mL) 400 mL 09/01/20 1755   Provider Notified to Remove No 09/01/20 2000       Findings: Left renal mass with left perirenal mass adherent to and causing obstruction and inflammation in mesentery of descending colon and proximal jejunum 8 cm from the ligament of Treitz    Detailed Description of Procedure:   After Dr. Rajwinder Nelson mobilized the left kidney and secured the vascular pedicle, the [employer] robot was undocked and patient was placed supine on the operating table. Abdomen was prepped and draped in usual sterile fashion and a midline incision extending from 6 cm below the xiphoid to 3 centered below the umbilicus was made in the skin is subtenons tissue were sharply divided down to the level of the fascia. Hemostasis was accomplished using electrocautery and the abdominal cavity was entered and the abdomen was thoroughly explored. The left kidney was then delivered into the operative field and detached from its remaining retroperitoneal attachments using LigaSure for hemostasis. This was handed off for pathologic examination and the mesentery of the descending colon was found to contain a inflammatory and/or potentially neoplastic mass causing high-grade obstruction of the descending colon and proximal jejunum on the serosal side just distal to the ligament of Treitz.   It was felt in light of the potential neoplastic diagnosis that this required an en bloc resection and the white line of Toldt and splenic flexure were widely mobilized to the mid transverse intensive care unit having tolerated procedure well and all sponge needle instrument counts were reported correct at the end of the case.     Electronically signed by Rodrick James DO on 9/2/2020 at 5:37 PM

## 2020-09-02 NOTE — ANESTHESIA PRE PROCEDURE
Department of Anesthesiology  Preprocedure Note       Name:  Maia Min II   Age:  56 y.o.  :  1960                                          MRN:  [id number]         Date:  2020      Surgeon: Dior Degree):  Lula Suarez MD    Procedure: Procedure(s):  CYSTOSCOPY URETERAL STENT INSERTION    Medications prior to admission:   Prior to Admission medications    Medication Sig Start Date End Date Taking? Authorizing Provider   traMADol (ULTRAM) 50 MG tablet Take 100 mg by mouth every 6 hours as needed for Pain.    Yes Historical Provider, MD   Melatonin 10 MG TABS Take 10 mg by mouth nightly as needed (SLEEP)   Yes Historical Provider, MD   vitamin C (ASCORBIC ACID) 500 MG tablet Take 500 mg by mouth daily   Yes Historical Provider, MD   lidocaine 4 % external patch Place 1 patch onto the skin daily  Patient taking differently: Place 1 patch onto the skin daily as needed (TO HIP)  8/11/20 9/10/20  Malissa Crabtree MD   acetaminophen (TYLENOL) 500 MG tablet Take 2 tablets by mouth every 6 hours as needed for Pain 20  Malissa Crabtree MD   cyclobenzaprine (FLEXERIL) 10 MG tablet Take 1 tablet by mouth 3 times daily as needed for Muscle spasms  Patient taking differently: Take 10 mg by mouth 2 times daily as needed for Muscle spasms  20   Malissa Crabtree MD   aspirin 81 MG tablet Take 81 mg by mouth nightly     Historical Provider, MD   SITagliptin (JANUVIA) 50 MG tablet Take 25 mg by mouth daily     Historical Provider, MD   pravastatin (PRAVACHOL) 20 MG tablet Take 20 mg by mouth daily    Historical Provider, MD   metoprolol (LOPRESSOR) 50 MG tablet Take 50 mg by mouth 2 times daily    Historical Provider, MD   lisinopril (PRINIVIL;ZESTRIL) 20 MG tablet Take 20 mg by mouth daily    Historical Provider, MD   allopurinol (ZYLOPRIM) 100 MG tablet Take 100 mg by mouth daily    Historical Provider, MD   zolpidem (AMBIEN) 10 MG tablet Take by mouth nightly as needed for Sleep Historical Provider, MD   hydrALAZINE (APRESOLINE) 25 MG tablet Take 25 mg by mouth 3 times daily     Historical Provider, MD   furosemide (LASIX) 40 MG tablet Take 40 mg by mouth daily as needed (LEG SWELLING)     Historical Provider, MD       Current medications:    Current Facility-Administered Medications   Medication Dose Route Frequency Provider Last Rate Last Dose    morphine (PF) injection 1 mg  1 mg Intravenous Q4H PRN Sayra Hilton MD   1 mg at 09/01/20 1009    metoprolol (LOPRESSOR) injection 5 mg  5 mg Intravenous Q6H Debby Severance, MD   5 mg at 09/01/20 1746    0.9 % sodium chloride infusion   Intravenous Continuous Kishor Flores  mL/hr at 09/01/20 1459      insulin lispro (HUMALOG) injection vial 0-12 Units  0-12 Units Subcutaneous TID WC Kishor Flores MD        insulin lispro (HUMALOG) injection vial 0-6 Units  0-6 Units Subcutaneous Nightly Kishor Flores MD        vancomycin 1000 mg IVPB in 250 mL D5W addavial  1 g Intravenous Once Kishor Flores MD        metronidazole (FLAGYL) 500 mg in NaCl 100 mL IVPB premix  500 mg Intravenous Kriss Carmichael MD   Stopped at 09/01/20 2011    0.9 % sodium chloride bolus  500 mL Intravenous Once Lashaun Sanchez  mL/hr at 09/01/20 1928 500 mL at 09/01/20 1928    cefepime (MAXIPIME) 2 g IVPB minibag  2 g Intravenous Q12H TORREY Jacob CNP   Stopped at 09/01/20 1529    sodium chloride flush 0.9 % injection 10 mL  10 mL Intravenous 2 times per day TORREY Jacob CNP        sodium chloride flush 0.9 % injection 10 mL  10 mL Intravenous PRN TORREY Jacob CNP        lidocaine (XYLOCAINE) 2 % uro-jet 5 mL  5 mL Topical PRN TORREY Jacob CNP   5 mL at 08/31/20 1720    allopurinol (ZYLOPRIM) tablet 100 mg  100 mg Oral Daily Sayra Hilton MD        aspirin EC tablet 81 mg  81 mg Oral Nightly Sayra Hilton MD        cyclobenzaprine (FLEXERIL) tablet 10 mg  10 mg Oral BID PRN Sayra Hilton MD  furosemide (LASIX) tablet 40 mg  40 mg Oral Daily PRN Alice Louis MD        hydrALAZINE (APRESOLINE) tablet 25 mg  25 mg Oral TID Alice Louis MD        lidocaine 4 % external patch 1 patch  1 patch Transdermal Daily PRN Alice Louis MD        lisinopril (PRINIVIL;ZESTRIL) tablet 20 mg  20 mg Oral Daily Alice Louis MD        melatonin tablet 9 mg  9 mg Oral Nightly PRN Alice Louis MD        metoprolol tartrate (LOPRESSOR) tablet 50 mg  50 mg Oral BID Alice Louis MD        pravastatin (PRAVACHOL) tablet 20 mg  20 mg Oral Nightly Ailce Louis MD        alogliptin (NESINA) tablet 6.25 mg  6.25 mg Oral Daily Alice Louis MD        vitamin C (ASCORBIC ACID) tablet 500 mg  500 mg Oral Daily Alice Louis MD        zolpidem (AMBIEN) tablet 5 mg  5 mg Oral Nightly PRN Alice Louis MD        sodium chloride flush 0.9 % injection 10 mL  10 mL Intravenous 2 times per day Alice Louis MD        sodium chloride flush 0.9 % injection 10 mL  10 mL Intravenous PRN Alice Louis MD        acetaminophen (TYLENOL) tablet 650 mg  650 mg Oral Q6H PRN Alice Louis MD        Or    acetaminophen (TYLENOL) suppository 650 mg  650 mg Rectal Q6H PRN Alice Louis MD        polyethylene glycol (GLYCOLAX) packet 17 g  17 g Oral Daily PRN Alice Louis MD        promethazine (PHENERGAN) tablet 12.5 mg  12.5 mg Oral Q6H PRN Alice Louis MD        Or    ondansetron [facility]) injection 4 mg  4 mg Intravenous Q6H PRN Alice Louis MD   4 mg at 09/01/20 0035    enoxaparin (LOVENOX) injection 30 mg  30 mg Subcutaneous BID Alice Louis MD   Stopped at 08/31/20 2305    dextrose 50 % IV solution  12.5 g Intravenous PRN Alice Louis MD        potassium chloride 10 mEq/100 mL IVPB (Peripheral Line)  10 mEq Intravenous PRN Alice Louis  mL/hr at 09/01/20 0808 10 mEq at 09/01/20 0808    magnesium sulfate 1 g in dextrose 5% 100 mL IVPB  1 g Intravenous PRN Alice Louis MD        sodium phosphate 10 mmol in dextrose 5 % 250 mL IVPB  10 mmol Intravenous PRN Summer Bernal MD        Or    sodium phosphate 15 mmol in dextrose 5 % 250 mL IVPB  15 mmol Intravenous PRN Summer Bernal MD   Stopped at 09/01/20 0219    Or    sodium phosphate 20 mmol in dextrose 5 % 500 mL IVPB  20 mmol Intravenous PRN Summer Bernal MD           Allergies: Allergies   Allergen Reactions    Ampicillin Swelling     Swelling of throat.  Pcn [Penicillins] Swelling     Throat swelling. Tolerated cefepime during 8/31/20 admission.  Tape Anthonette David Tape]        Problem List:    Patient Active Problem List   Diagnosis Code    Cellulitis L03.90    Type 2 diabetes mellitus without complication ([facility] [address] K74.1    Essential hypertension I10    Morbid obesity due to excess calories (HCC) E66.01    Chronic kidney disease (CKD) N18.9    DEBBI (acute kidney injury) ([facility] [address] N17.9    Bowel obstruction ([facility] [address] K56.609    Small bowel obstruction ([facility] [address] K56.609       Past Medical History:        Diagnosis Date    Back pain     Cellulitis     Diabetes mellitus ([address]     Gout     History of kidney cancer 08/10/2020    Recent diagnosis    Hypertension        Past Surgical History:        Procedure Laterality Date    ANKLE SURGERY      CARPAL TUNNEL RELEASE Bilateral     KNEE SURGERY Bilateral        Social History:    Social History     Tobacco Use    Smoking status: Never Smoker    Smokeless tobacco: Never Used   Substance Use Topics    Alcohol use:  Yes     Alcohol/week: 0.0 standard drinks     Comment: once a week                                Counseling given: Not Answered      Vital Signs (Current):   Vitals:    09/01/20 1700 09/01/20 1800 09/01/20 1900 09/01/20 2000   BP:  117/88 115/87 (!) 128/101   Pulse: 124 106 115 118   Resp: 24 15 11 19   Temp:       TempSrc:       SpO2: (!) 87% 99% 98% 92%   Weight:       Height:                                                  BP Readings from Last 3 Encounters:   09/01/20 (!) 128/101   08/10/20 129/83   03/16/20 112/79       NPO Status:                                                                                 BMI:   Wt Readings from Last 3 Encounters:   08/31/20 255 lb (115.7 kg)   08/10/20 263 lb (119.3 kg)   06/09/20 266 lb (120.7 kg)     Body mass index is 35.57 kg/m².     CBC:   Lab Results   Component Value Date    WBC 22.0 09/01/2020    RBC 4.15 09/01/2020    HGB 11.3 09/01/2020    HCT 35.7 09/01/2020    MCV 86.0 09/01/2020    RDW 12.7 09/01/2020     09/01/2020       CMP:   Lab Results   Component Value Date     09/01/2020    K 3.9 09/01/2020    CL 95 09/01/2020    CO2 31 09/01/2020    BUN 34 09/01/2020    CREATININE 1.66 09/01/2020    GFRAA 51 09/01/2020    LABGLOM 42 09/01/2020    GLUCOSE 136 09/01/2020    PROT 9.3 08/31/2020    CALCIUM 9.5 09/01/2020    BILITOT 0.50 08/31/2020    ALKPHOS 104 08/31/2020    AST 21 08/31/2020    ALT 23 08/31/2020       POC Tests:   Recent Labs     09/01/20  1658   POCGLU 131*       Coags: No results found for: PROTIME, INR, APTT    HCG (If Applicable): No results found for: PREGTESTUR, PREGSERUM, HCG, HCGQUANT     ABGs: No results found for: PHART, PO2ART, WVN4AKH, CKJ0OZC, BEART, O4ZSBNQF     Type & Screen (If Applicable):  No results found for: LABABO, LABRH    Drug/Infectious Status (If Applicable):  No results found for: HIV, HEPCAB    COVID-19 Screening (If Applicable):   Lab Results   Component Value Date    COVID19 Not Detected 06/09/2020         Anesthesia Evaluation   no history of anesthetic complications:   Airway: Mallampati: III  TM distance: >3 FB   Neck ROM: full  Mouth opening: > = 3 FB Dental:          Pulmonary:normal exam                               Cardiovascular:  Exercise tolerance: good (>4 METS),   (+) hypertension:,     (-)  angina        Rate: abnormal           Beta Blocker:  Not on Beta Blocker         Neuro/Psych:   Negative Neuro/Psych ROS              GI/Hepatic/Renal:        (-) GERD Endo/Other:    (+) Diabetes, . Abdominal:           Vascular:                                        Anesthesia Plan      general     ASA 5 - emergent       Induction: intravenous and rapid sequence. MIPS: Postoperative opioids intended and Prophylactic antiemetics administered. Anesthetic plan and risks discussed with patient. Plan discussed with CRNA.     Attending anesthesiologist reviewed and agrees with Heidy Freeman MD   9/1/2020

## 2020-09-02 NOTE — OP NOTE
[address] [address]                                OPERATIVE REPORT    PATIENT NAME: Ephraim Baltazar                   :        1960  MED REC NO:   [id number]                             ROOM:       1110  ACCOUNT NO:   [de-identified]                           ADMIT DATE: 2020  PROVIDER:     Jack Garcia    DATE OF PROCEDURE:  2020    SURGEON:  Dr. Jack Garcia. PREOPERATIVE DIAGNOSES:  Distal right ureteral stone and systemic  inflammatory response syndrome. POSTOPERATIVE DIAGNOSES:  Distal right ureteral stone and systemic  inflammatory response syndrome. PROCEDURE PERFORMED:  Cystoscopy with right ureteral stent placement. ANESTHESIA:  General.    COMPLICATIONS:  None. BLEEDING:  None. DRAINS:  6 x 28 stent on right and 16-Tamazight Bryant catheter. SPECIMENS:  None. HISTORY OF PRESENT ILLNESS:  The patient is a 25-year-old male who was  admitted with small bowel obstruction secondary to a left-sided renal  mass, possible involvement of the mesentery of the small bowel. Clinically, his picture worsened including elevated white blood cell  count, worsening creatinine, and hemodynamic instability with atrial  fibrillation and hypotension. He was to be transferred to [facility]. _____ but  on review of the CAT scan, it was revealed that he had distal right  ureteral stone given the clinical picture and the obstructing stone. He  is here today for stent placement. PROCEDURE IN DETAIL:  The patient was brought back to the operating room  and laid on the operating table in supine position. Once general  anesthesia was obtained, he was placed in the dorsal lithotomy position,  prepped and draped in usual sterile fashion. Time-out was performed. He was properly identified and antibiotics were administered. The  cystoscope was inserted through the urethra into the bladder.   The  bladder was unremarkable other than some _____ catheter, cystitis. The  right ureteral orifice was identified. A Glidewire was advanced up to  the right ureteral orifice with no difficulty into the collecting  system. The position of the wire was confirmed with fluoroscopy. A 6 x  28 stent was advanced over the wire. Good proximal curl was seen at the  renal pelvis and good distal curl was seen in the bladder. Some  hydronephrotic urine drained from the kidney but no negra pus was seen. Bryant catheter was placed. After the scope was removed, the procedure  was completed. He awoke from anesthesia without any complications. He  was taken back to postoperative anesthesia care in stable condition. He  will then be transferred to ICU and ultimately will be transferred to  [facility] for possible left nephrectomy and management of his bowel  obstruction.         Fidelina Hall    D: 09/01/2020 21:33:50       T: 09/02/2020 0:22:06     [email or web address]  Job#: [id number]     Doc#: [id number]    CC:

## 2020-09-02 NOTE — CONSULTS
Urology Consultation    Patient:  Екатерина Begum  MRN: [id number]  YOB: 1960    CHIEF COMPLAINT:  Left renal mass, right ureteral stone. HISTORY OF PRESENT ILLNESS:   The patient is a 56 y.o. male who presents with SBO. He was admitted with abdominal pain. He had a CT, which showed a left renal mass with invasion into the mesentary. He had an NG tube placed and had a large output. He denies any right flank pain. I was called this evening to arrange transfer to [facility].  On evaluation of the CT, he was found to have a distal right ureteral stone. Clinically, he is doing worse today. His HR has increased and his BP has dropped. There is concern for sepsis. He does report a h/o stones. Patient's old records, notes and chart reviewed and summarized above.     Past Medical History:    Past Medical History:   Diagnosis Date    Back pain     Cellulitis     Diabetes mellitus ([facility] [address]     Gout     History of kidney cancer 08/10/2020    Recent diagnosis    Hypertension        Past Surgical History:    Past Surgical History:   Procedure Laterality Date    ANKLE SURGERY      CARPAL TUNNEL RELEASE Bilateral     KNEE SURGERY Bilateral      Previous  surgery: lithotripsy/stent    Medications:    Scheduled Meds:   metoprolol  5 mg Intravenous Q6H    insulin lispro  0-12 Units Subcutaneous TID WC    insulin lispro  0-6 Units Subcutaneous Nightly    vancomycin  1 g Intravenous Once    metroNIDAZOLE  500 mg Intravenous Q8H    sodium chloride  500 mL Intravenous Once    cefepime  2 g Intravenous Q12H    sodium chloride flush  10 mL Intravenous 2 times per day    allopurinol  100 mg Oral Daily    aspirin  81 mg Oral Nightly    hydrALAZINE  25 mg Oral TID    lisinopril  20 mg Oral Daily    metoprolol tartrate  50 mg Oral BID    pravastatin  20 mg Oral Nightly    alogliptin  6.25 mg Oral Daily    vitamin C  500 mg Oral Daily    sodium chloride flush  10 mL Intravenous 2 times per day    enoxaparin  30 mg Subcutaneous BID     Continuous Infusions:   sodium chloride 150 mL/hr at 09/01/20 1459     PRN Meds:.morphine, sodium chloride flush, lidocaine, cyclobenzaprine, furosemide, lidocaine, melatonin, zolpidem, sodium chloride flush, acetaminophen **OR** acetaminophen, polyethylene glycol, promethazine **OR** ondansetron, dextrose, potassium chloride, magnesium sulfate, sodium phosphate IVPB **OR** sodium phosphate IVPB **OR** sodium phosphate IVPB    Allergies:  Ampicillin; Pcn [penicillins]; and Tape Valerie Aliment tape]    Social History:    Social History     Socioeconomic History    Marital status:      Spouse name: Not on file    Number of children: Not on file    Years of education: Not on file    Highest education level: Not on file   Occupational History    Not on file   Social Needs    Financial resource strain: Not on file    Food insecurity     Worry: Not on file     Inability: Not on file    Transportation needs     Medical: Not on file     Non-medical: Not on file   Tobacco Use    Smoking status: Never Smoker    Smokeless tobacco: Never Used   Substance and Sexual Activity    Alcohol use:  Yes     Alcohol/week: 0.0 standard drinks     Comment: once a week    Drug use: No    Sexual activity: Not on file   Lifestyle    Physical activity     Days per week: Not on file     Minutes per session: Not on file    Stress: Not on file   Relationships    Social connections     Talks on phone: Not on file     Gets together: Not on file     Attends Sikh service: Not on file     Active member of club or organization: Not on file     Attends meetings of clubs or organizations: Not on file     Relationship status: Not on file    Intimate partner violence     Fear of current or ex partner: Not on file     Emotionally abused: Not on file     Physically abused: Not on file     Forced sexual activity: Not on file   Other Topics Concern    Not on file   Social History Narrative    Not on file       Family History:    Family History   Problem Relation Age of Onset    Stroke Maternal Grandmother     Stroke Maternal Grandfather      Previous Urologic Family history: none    REVIEW OF SYSTEMS:  Constitutional: negative  Eyes: negative  Respiratory: negative  Cardiovascular: negative  Gastrointestinal: abdominal pain  Genitourinary: see HPI  Musculoskeletal: negative  Skin: negative   Neurological: negative  Hematological/Lymphatic: negative  Psychological: negative    Physical Exam:    This a 56 y.o. male   Patient Vitals for the past 24 hrs:   BP Temp Temp src Pulse Resp SpO2   09/01/20 2000 (!) 128/101 -- -- 118 19 92 %   09/01/20 1900 115/87 -- -- 115 11 98 %   09/01/20 1800 117/88 -- -- 106 15 99 %   09/01/20 1700 -- -- -- 124 24 (!) 87 %   09/01/20 1600 129/86 98.5 °F (36.9 °C) Oral 108 17 100 %   09/01/20 1500 125/80 -- -- 113 14 100 %   09/01/20 1400 (!) 127/96 -- -- 116 21 93 %   09/01/20 1300 (!) 141/93 -- -- 110 15 100 %   09/01/20 1200 (!) 158/98 99 °F (37.2 °C) Oral 108 13 99 %   09/01/20 1100 (!) 148/87 -- -- 103 12 100 %   09/01/20 1000 (!) 140/101 -- -- 111 19 98 %   09/01/20 0900 (!) 139/105 -- -- 109 16 98 %   09/01/20 0800 (!) 132/101 98 °F (36.7 °C) Oral 120 16 98 %   09/01/20 0700 (!) 133/100 -- -- 108 9 97 %   09/01/20 0600 (!) 131/106 -- -- 100 12 99 %   09/01/20 0500 (!) 126/97 -- -- 106 13 99 %   09/01/20 0400 (!) 132/102 -- -- 101 15 98 %   09/01/20 0300 (!) 132/93 -- -- 129 -- 98 %   09/01/20 0200 (!) 125/93 -- -- 121 17 (!) 86 %   09/01/20 0100 (!) 139/93 -- -- 128 16 97 %   09/01/20 0000 (!) 142/93 -- -- 118 16 97 %   08/31/20 2300 113/85 -- -- 118 16 98 %   08/31/20 2200 (!) 127/92 -- -- 124 19 100 %   08/31/20 2100 121/89 -- -- 129 20 99 %     Constitutional: Patient in no acute distress; Neuro: alert and oriented to person place and time.     Psych: Mood and affect normal.  Skin: Normal  Lungs: Respiratory effort normal  Cardiovascular:  Normal peripheral pulses  Abdomen: Soft, non-tender, non-distended with no CVA, flank pain, hepatosplenomegaly or hernia. Kidneys normal.  Bladder non-tender and not distended. Lymphatics: no palpable lymphadenopathy  Penis normal and circumcised  Urethral meatus normal  Scrotal exam normal  Testicles normal bilaterally  Epididymis normal bilaterally  No evidence of inguinal hernia  Anus and perineum normal  Normal rectal tone with no masses  Prostate soft, non-tender to palpation. No palpable nodules. Estimated 40 grams. LABS:  Recent Labs     08/31/20  1354 08/31/20 2008 09/01/20  0830   WBC 20.0* 16.6* 22.0*   HGB 12.0* 10.9* 11.3*   HCT 39.3* 35.2* 35.7*   MCV 87.9 86.1 86.0    470* 514*     Recent Labs     09/01/20  0830 09/01/20  1223 09/01/20  1659    138 140   K 4.2 4.4 3.9   CL 94* 94* 95*   CO2 27 30 31   PHOS 3.1 4.1 4.2   BUN 37* 36* 34*   CREATININE 1.82* 1.83* 1.66*     No results found for: PSA    Additional Lab/culture results:    Urinalysis:   Recent Labs     08/31/20  2330   COLORU YELLOW   PHUR 8.5*   WBCUA 50    RBCUA 0 TO 2   MUCUS NOT REPORTED   TRICHOMONAS NOT REPORTED   YEAST NOT REPORTED   BACTERIA RARE*   SPECGRAV 1.015   LEUKOCYTESUR SMALL*   UROBILINOGEN Normal   BILIRUBINUR NEGATIVE        -----------------------------------------------------------------  Imaging Results:  CT images reviewed. Distal right ureteral stone noted. Left renal mass   Dilated loops of small bowel noted as well. Assessment and Plan   Impression:    Patient Active Problem List   Diagnosis    Cellulitis    Type 2 diabetes mellitus without complication ([facility] [address]    Essential hypertension    Morbid obesity due to excess calories (HCC)    Chronic kidney disease (CKD)    DEBBI (acute kidney injury) ([facility] [address]    Bowel obstruction (HCC)    Small bowel obstruction ([facility] [address]       Plan:     Has left renal mas with possible invasion into Small bowel mesentery with SBO.    Clinically has worsened today  CT shows distal right ureteral stone. Will move forward with cysto and right ureteral stent placement. Will transfer to [facility] this evening for mgmt of renal mass and SBO.      Jack Garcia  8:18 PM 9/1/2020

## 2020-09-02 NOTE — PROGRESS NOTES
Physical Therapy  DATE: 2020    NAME: Sharyn Vega II  MRN: [id number]   : 1960    Patient not seen this date for Physical Therapy due to:  [] Blood transfusion in progress  [] Hemodialysis  []  Patient Declined  [] Spine Precautions   [] Strict Bedrest  [x] Surgery/ Procedure- OR today per RN. PT will check back 9/3/20. [] Testing      [] Other        [] PT being discontinued at this time. Patient independent. No further needs. [] PT being discontinued at this time as the patient has been transferred to palliative care. No further needs.     Yvonne Henry, PT

## 2020-09-02 NOTE — H&P
Critical Care - History and Physical Examination    Patient's name:  Julia Zacarias II  Medical Record Number: [id number]  Patient's account/billing number: [de-identified]  Patient's YOB: 1960  Age: 56 y.o. Date of Admission: 9/1/2020 11:04 PM  Date of History and Physical Examination: 9/2/2020      Primary Care Physician: Ari Nicholson MD  Attending Physician: Dr. Potter Moment    Code Status: Full Code    Chief complaint: SBO 2/2 mass effect from malignant left renal mass. HISTORY OF PRESENT ILLNESS:      History was obtained from the patient, wife and EMR. Julia Zacarias II is a 56 y.o. with PMH of T2DM, hypertension, morbid obesity, chronic kidney disease (baseline 1.3-1.7). Patient originally presented to [facility] on 08/31 with symptoms of abdominal distention, abdominal pain, nausea emesis and inability to tolerate p.o. diet. At [facility] emergency department patient was found to be severely dehydrated with acute kidney injury on CKD, elevated blood glucose in 400, leukocytosis 20,000 and tachycardic. CT abdomen pelvis was completed which demonstrated left renal mass 4.8 x 6.9 x 9.9 cm eroding into and causing small bowel obstruction. On the CT abdomen patient was also noted to have a right ureteral stone 5 x 11 mm. patient was admitted to the ICU and general surgery was consulted along with urology at [facility]. Patient was decompressed with NG tube, placed on IV fluids and insulin drip. Pt was also started on cefepime and flagyl for empiric sepsis coverage. Prior to transfer to [facility] this morning patient received cystoscopy and right ureteral stent    Pt came to the ICU with his wife. History was obtained from wife and patient. Pt states that his nausea has improved significantly since he got the NG tube. He was also complaining of right lower quadrant and epigastric pain rated 6/10.  Upon admission to [facility] ICU, pt was A&O/ AMS, normotensive, tachycardic at 120 and a/ febrile. White count today elevated at 22,000. Patient appears to be stable with NG tube with current output of. Patient is passing gas however has had no bowel movements. Nausea and vomiting controlled, patient denies any new episodes of vomiting. Labs from 8/31 - Procal  Elevated at 0.21, WBC elevated at 22k, creatinine elevated at 1.83. RCRI risk class II, 6% risk of death from MI or cardiac arrest. He is cleared for surgery from critical care stand point. PAST MEDICAL HISTORY:         Diagnosis Date    Back pain     Cellulitis     Diabetes mellitus ([facility] [address]     Gout     History of kidney cancer 08/10/2020    Recent diagnosis    Hypertension          PAST SURGICAL HISTORY:         Procedure Laterality Date    ANKLE SURGERY      CARPAL TUNNEL RELEASE Bilateral     KNEE SURGERY Bilateral        ALLERGIES:      Allergies   Allergen Reactions    Ampicillin Swelling     Swelling of throat.  Pcn [Penicillins] Swelling     Throat swelling. Tolerated cefepime during 8/31/20 admission.  Tape Natasha Gallus Tape]          HOME MEDS: :      Prior to Admission medications    Medication Sig Start Date End Date Taking? Authorizing Provider   traMADol (ULTRAM) 50 MG tablet Take 100 mg by mouth every 6 hours as needed for Pain.     Historical Provider, MD   Melatonin 10 MG TABS Take 10 mg by mouth nightly as needed (SLEEP)    Historical Provider, MD   vitamin C (ASCORBIC ACID) 500 MG tablet Take 500 mg by mouth daily    Historical Provider, MD   lidocaine 4 % external patch Place 1 patch onto the skin daily  Patient taking differently: Place 1 patch onto the skin daily as needed (TO HIP)  8/11/20 9/10/20  Curtis Gauthier MD   acetaminophen (TYLENOL) 500 MG tablet Take 2 tablets by mouth every 6 hours as needed for Pain 8/11/20 8/21/20  Curtis Gauthier MD   cyclobenzaprine (FLEXERIL) 10 MG tablet Take 1 tablet by mouth 3 times daily as needed for Muscle spasms  Patient taking differently: Take 10 mg by mouth 2 times daily as needed for Muscle spasms  8/11/20   Jeremy Mortensen MD   aspirin 81 MG tablet Take 81 mg by mouth nightly     Historical Provider, MD   SITagliptin (JANUVIA) 50 MG tablet Take 25 mg by mouth daily     Historical Provider, MD   pravastatin (PRAVACHOL) 20 MG tablet Take 20 mg by mouth daily    Historical Provider, MD   metoprolol (LOPRESSOR) 50 MG tablet Take 50 mg by mouth 2 times daily    Historical Provider, MD   lisinopril (PRINIVIL;ZESTRIL) 20 MG tablet Take 20 mg by mouth daily    Historical Provider, MD   allopurinol (ZYLOPRIM) 100 MG tablet Take 100 mg by mouth daily    Historical Provider, MD   zolpidem (AMBIEN) 10 MG tablet Take by mouth nightly as needed for Sleep    Historical Provider, MD   hydrALAZINE (APRESOLINE) 25 MG tablet Take 25 mg by mouth 3 times daily     Historical Provider, MD   furosemide (LASIX) 40 MG tablet Take 40 mg by mouth daily as needed (LEG SWELLING)     Historical Provider, MD       SOCIAL HISTORY:       TOBACCO:   reports that he has never smoked. He has never used smokeless tobacco.  ETOH:   reports current alcohol use. DRUGS:  reports no history of drug use. FAMILY HISTORY:          Problem Relation Age of Onset    Stroke Maternal Grandmother     Stroke Maternal Grandfather        REVIEW OF SYSTEMS (ROS):       Review of Systems   Constitutional: Positive for appetite change. Negative for fatigue, fever and unexpected weight change. HENT: Negative for sinus pressure, sinus pain and voice change. Eyes: Negative for visual disturbance. Respiratory: Negative for cough, shortness of breath and wheezing. Cardiovascular: Negative for chest pain, palpitations and leg swelling. Gastrointestinal: Positive for abdominal pain (Right lower quadrant, epigastric). Negative for abdominal distention, blood in stool, constipation, nausea, rectal pain and vomiting.    Endocrine: Negative for polydipsia, polyphagia and polyuria. Genitourinary: Negative for difficulty urinating, flank pain and frequency. Skin: Negative for color change, rash and wound. Neurological: Negative for dizziness, light-headedness, numbness and headaches. Psychiatric/Behavioral: Negative for confusion and decreased concentration. The patient is not nervous/anxious. OBJECTIVE:     VITAL SIGNS:  /82   Pulse 126   Temp 98.2 °F (36.8 °C) (Oral)   Resp 16   Ht 5' 11\" (1.803 m)   Wt 257 lb 0.9 oz (116.6 kg)   SpO2 97%   BMI 35.85 kg/m²   Tmax over 24 hours:  Temp (24hrs), Av.4 °F (36.9 °C), Min:98 °F (36.7 °C), Max:99 °F (37.2 °C)      Patient Vitals for the past 8 hrs:   BP Temp Temp src Pulse Resp SpO2 Height Weight   20 0500 127/82 -- -- 126 -- 97 % -- --   20 0400 117/74 98.2 °F (36.8 °C) Oral 124 16 96 % -- --   20 0300 122/75 -- -- 119 15 97 % -- --   20 0200 120/82 -- -- 109 11 98 % -- --   20 0151 -- -- -- 109 -- -- -- --   20 0100 111/87 -- -- 101 13 98 % -- --   20 0000 (!) 125/95 -- -- 126 14 97 % -- --   20 2305 124/79 98.4 °F (36.9 °C) Oral 120 14 98 % 5' 11\" (1.803 m) 257 lb 0.9 oz (116.6 kg)         Intake/Output Summary (Last 24 hours) at 2020 0619  Last data filed at 2020 0400  Gross per 24 hour   Intake 1369.39 ml   Output 815 ml   Net 554.39 ml     Date 20 0000 - 20 2359   Shift 4099-7145 1530-9427 9389-3567 24 Hour Total   INTAKE   I.V.(mL/kg) 1369.4(11.7)   1369.4(11.7)   Shift Total(mL/kg) 1369.4(11.7)   1369.4(11.7)   OUTPUT   Urine(mL/kg/hr) 220   220   Emesis/NG output(mL/kg) 250(2.1)   250(2.1)   Shift Total(mL/kg) 470(4)   470(4)   Weight (kg) 116.6 116.6 116.6 116.6     Wt Readings from Last 3 Encounters:   20 257 lb 0.9 oz (116.6 kg)   20 255 lb (115.7 kg)   08/10/20 263 lb (119.3 kg)     Body mass index is 35.85 kg/m².         PHYSICAL EXAM:  Constitutional: Appears well, no distress, AAOx3  EENT: neck supple with 09/01/20  1223 09/01/20  1659 09/01/20 2039 09/02/20  0447   GLUCOSE 304* 198* 186* 165* 201* 136* 163* 256*        PT/INR:  No results found for: PROTIME, INR  PTT:  No results found for: APTT, PTT    Comprehensive Metabolic Profile:   Recent Labs     08/31/20  1354  09/01/20  1659 09/01/20 2039 09/02/20  0020 09/02/20  0447   *   < > 140 140  --  140   K 5.2   < > 3.9 4.0  --  4.2   CL 85*   < > 95* 96*  --  100   CO2 21   < > 31 27  --  24   BUN 34*   < > 34* 34*  --  33*   CREATININE 1.76*   < > 1.66* 1.72*  --  1.58*   GLUCOSE 416*   < > 136* 163*  --  256*   CALCIUM 10.6*   < > 9.5 9.2  --  8.7   PROT 9.3*  --   --   --  7.9  --    LABALBU 3.6  --   --   --  3.1*  --    BILITOT 0.50  --   --   --  0.43  --    ALKPHOS 104  --   --   --  92  --    AST 21  --   --   --  47*  --    ALT 23  --   --   --  24  --     < > = values in this interval not displayed.       Magnesium:   Lab Results   Component Value Date    MG 1.8 09/02/2020    MG 1.6 09/01/2020    MG 1.8 09/01/2020     Phosphorus:   Lab Results   Component Value Date    PHOS 3.6 09/01/2020    PHOS 4.2 09/01/2020    PHOS 4.1 09/01/2020     Ionized Calcium:   Lab Results   Component Value Date    CAION 1.11 09/02/2020        Urinalysis:   Lab Results   Component Value Date    NITRU NEGATIVE 09/02/2020    COLORU RED 09/02/2020    PHUR 8.5 09/02/2020    WBCUA TOO NUMEROUS TO COUNT 09/02/2020    RBCUA TOO NUMEROUS TO COUNT 09/02/2020    MUCUS NOT REPORTED 09/02/2020    TRICHOMONAS NOT REPORTED 09/02/2020    YEAST NOT REPORTED 09/02/2020    BACTERIA NOT REPORTED 09/02/2020    SPECGRAV 1.019 09/02/2020    LEUKOCYTESUR LARGE 09/02/2020    UROBILINOGEN Normal 09/02/2020    BILIRUBINUR NEGATIVE 09/02/2020    GLUCOSEU NEGATIVE 09/02/2020    KETUA NEGATIVE 09/02/2020    AMORPHOUS NOT REPORTED 09/02/2020       HgBA1c:    Lab Results   Component Value Date    LABA1C 16.4 06/22/2020     TSH:    Lab Results   Component Value Date    TSH 0.68 12/12/2016       Lactic Acid:   Lab Results   Component Value Date    LACTA NOT REPORTED 10/03/2017      Troponin: No results for input(s): TROPONINI in the last 72 hours. Electrocardiogram:   Pending    Last Echocardiogram findings:   (See actual reports for details)  Echo (08/31/2020): Mild left ventricular hypertrophy, EF of 60%    Radiological imaging  Ct Abdomen Pelvis Wo Contrast    Result Date: 9/1/2020  EXAMINATION: CT OF THE ABDOMEN AND PELVIS WITHOUT CONTRAST 8/31/2020 3:20 pm TECHNIQUE: CT of the abdomen and pelvis was performed without the administration of intravenous contrast. Multiplanar reformatted images are provided for review. Dose modulation, iterative reconstruction, and/or weight based adjustment of the mA/kV was utilized to reduce the radiation dose to as low as reasonably achievable. COMPARISON: 8/21/2020 HISTORY: ORDERING SYSTEM PROVIDED HISTORY: Abdominal Pain TECHNOLOGIST PROVIDED HISTORY: Abdominal Pain Reason for Exam: Pt states vomiting since last night. Pt states known left renal mass to be removed in 2 days. Acuity: Acute Type of Exam: Initial FINDINGS: Lower Chest: No acute infiltrate at the lung bases. Organs: The unenhanced liver, spleen, pancreas and adrenal glands are unremarkable. No acute biliary findings. The right kidney is unremarkable. There is an infiltrative mass in the lower pole of the left kidney, previously partially visualized on CT of the right hip. This measures approximately 4.8 x 6.9 x 9.9 cm. The mass infiltrates the mesentery and results in proximal small bowel obstruction. There is a small middle pole left renal cyst.  Nonobstructive lower pole calculus measuring 6 x 12 mm. GI/Bowel: Marked gastric and duodenal distention. There is proximal small-bowel obstruction due to the infiltrative lower pole left renal mass. The distal small bowel is nondistended. No pericolonic inflammatory changes. Pelvis: Trace free pelvic fluid. No pelvic mass is identified.   The prostate is not enlarged. Partial distention of the urinary bladder. Peritoneum/Retroperitoneum: The abdominal aorta is normal in caliber. 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 No pathologic retroperitoneal adenopathy. Bones/Soft Tissues: No acute osseous or soft tissue abnormality. No lytic or blastic osseous lesions. Multilevel degenerative changes in the lower thoracic and lumbar spine. 1. Proximal small-bowel obstruction due to extension of an infiltrative lower pole left renal mass to the mesentery and adjacent small bowel. There is marked gastric distension. 2. Infiltrative lower pole left renal mass, presumed malignant measuring 4.8 x 6.9 x 9.9 cm. Nonobstructive left nephrolithiasis. 3. Trace free pelvic fluid. Findings were discussed with Ridge Cameron at 3:37 pm on 8/31/2020. Xr Abdomen (kub) (single Ap View)    Addendum Date: 9/1/2020    ADDENDUM: Dose area product 1037.1 mGy per cm squared and fluoro time 5.6 seconds     Result Date: 9/1/2020  EXAMINATION: ONE SUPINE XRAY VIEW(S) OF THE ABDOMEN 9/1/2020 9:32 pm COMPARISON: 16 hours ago HISTORY: ORDERING SYSTEM PROVIDED HISTORY: cysto with right stent TECHNOLOGIST PROVIDED HISTORY: cysto with right stent Reason for Exam: cysto Acuity: Acute Type of Exam: Initial FINDINGS: Double-J ureteral stent present. On the contralateral side there appears to be a nephrolith. Please correlate with clinical service     Xr Acute Abd Series Chest 1 Vw    Result Date: 8/31/2020  EXAMINATION: TWO XRAY VIEWS OF THE ABDOMEN AND SINGLE  XRAY VIEW OF THE CHEST 8/31/2020 2:25 pm COMPARISON: None. HISTORY: ORDERING SYSTEM PROVIDED HISTORY: Pain TECHNOLOGIST PROVIDED HISTORY: Pain Reason for Exam: pt states vomiting and abdomen pain x 2 days Acuity: Acute Type of Exam: Initial FINDINGS: The cardiomediastinal silhouette is unremarkable. The lungs are clear. No infiltrate, pleural fluid or focal process is seen.   Moderate aortic tortuosity, particularly involving the ascending aorta. 5 x 11 mm calcification on the left at the L3 vertebral level, possibly within the left renal pelvis or proximal left ureter. No other plain film evidence of renal or ureteral calculi. Bowel gas pattern is nonspecific. No focally distended loops, air-fluid levels or free air. Mild gastric distension. Multilevel osteoarthritic spurring in the lumbar spine. No acute cardiopulmonary disease. 5 x 11 mm calcification, possibly a renal or proximal ureteral calculus. Nonspecific bowel-gas pattern. Xr Chest Portable    Result Date: 9/1/2020  EXAMINATION: ONE XRAY VIEW OF THE CHEST 9/1/2020 8:31 pm COMPARISON: 05/14/2010 HISTORY: ORDERING SYSTEM PROVIDED HISTORY: evaluation TECHNOLOGIST PROVIDED HISTORY: evaluation Reason for Exam: evaluation Acuity: Acute Type of Exam: Initial FINDINGS: Cardiomediastinal silhouette is unchanged in size. No pulmonary consolidation, pleural effusion, or pneumothorax. No acute osseous abnormality. Enteric tube is below the diaphragm with tip outside the field of view. No acute cardiopulmonary abnormality. Ct Hip Right Wo Contrast    Result Date: 8/21/2020  EXAMINATION: CT OF THE RIGHT HIP WITHOUT CONTRAST 8/21/2020 2:03 pm TECHNIQUE: CT of the right hip was performed without the administration of intravenous contrast.  Multiplanar reformatted images are provided for review. Dose modulation, iterative reconstruction, and/or weight based adjustment of the mA/kV was utilized to reduce the radiation dose to as low as reasonably achievable. COMPARISON: Radiographs dated 08/10/2020 HISTORY ORDERING SYSTEM PROVIDED HISTORY: Pain of right hip joint FINDINGS: Bones: There is no acute fracture or dislocation. No suspicious osteolytic lesions. Soft Tissue:  Muscle attenuation is within normal limits. No evidence of soft tissue fluid collection or gas.  On the superior most images acquired through the pelvis, there is a soft tissue density in the mid left abdomen, which is indeterminate. This could simply be volume averaging at the inferior tip of the left kidney, but other mass lesions are not excluded. There is a 0.3 x 0.5 cm calculus in the distal right ureter. Joint:  No evidence of joint effusion at the hips. There is mild joint space narrowing and marginal spurring at the right hip. Scattered mild subchondral cystic changes noted in the femoral head. No articular surface collapse. Small ossification is seen adjacent to the superior acetabulum, likely a labral ossification. There are moderate to severe degenerative changes at the sacroiliac joints, worse on the right. Severe facet arthropathy is seen in the visualized lower lumbar spine. 1.  No acute osseous abnormality in the right hip. 2.  Mild degenerative changes at the right hip. Prominent degenerative changes also noted in the sacroiliac joints and visualized lower lumbar spine. 3.  A 0.3 x 0.5 cm calculus is seen at the distal right ureter. Right kidney is not included in the field of view to assess for hydronephrosis. CT imaging of the abdomen and pelvis is recommended. 4.  Indeterminate soft tissue density in the mid left abdomen, which is incompletely visualized. This could simply be volume averaging at the inferior aspect of the left kidney, but other mass lesions are not excluded. This can also be evaluated with CT imaging of the abdomen and pelvis. Xr Abdomen For Ng/og/ne Tube Placement    Result Date: 9/1/2020  EXAMINATION: ONE SUPINE XRAY VIEW(S) OF THE ABDOMEN 9/1/2020 6:26 am COMPARISON: 08/31/2020 HISTORY: ORDERING SYSTEM PROVIDED HISTORY: Confirmation of course of NG/OG/NE tube and location of tip of tube TECHNOLOGIST PROVIDED HISTORY: Confirmation of course of NG/OG/NE tube and location of tip of tube Portable? ->Yes Reason for Exam: ng placement Acuity: Unknown Type of Exam: Unknown FINDINGS: The nasogastric tube proximal side port is in the proximal Flagyl and cefepime at [facility]  - afebrile, tachycardic WBC trending up to 22  - We will start on cefepime and vancomycin  - s/p one fluid bolus  - IV Lopressor q6 PRN for tachycardia  - procal elevated at 0.20  - UA large LE, and large hemoglobin and TNTC WBC. Will await cultures. - f/u urine culture and blood culture  -Follow-up lactic acid, on 8/31 3.2->1.9  - CXR 9/2 am     3. Right renal calculi s/p right ureteral stent  - Ct hip right (08/21) - 0.3 x 0.5 cm calculus seen at the distal right ureter. - 5 x 11 cm right ureteral stone noted on CT  -Status post cystoscopy and right ureteral stent placement 09/01 at [facility]  - urology on board  - strict Is & Os    4. type 2 diabetes mellitus, CKD stage II (baseline Cr 1.3->1.7)  -Patient is on Januvia at home, held  -POCT glucose checks  -Hypoglycemia protocol  -high dose sliding scale  - one time lantus 20 units    5. Essential hypertension  -Patient is on Lopressor 50 mg twice daily, lisinopril 20 mg daily, hydralazine 25 mg 3 times daily at home  -All p.o. meds held currently      Feeding Diet: Diet NPO Effective Now  Fluids normal saline @ 150ml.hr  Family communicated with wife regarding treatment plan. Analgesic none   Sedation none  Thrombo-prophylaxis heparin   Mobility fair   Heads up 30 Degrees  Ulcer prophylaxis [x] PPI Agent  [] [id number] [] Sucralfate  [] Other:  Glycemic control medium dose sliding scale  Spontaneous breathing trial - none  Bowel regimen/urine output last BM 08/30 passing gas  Indwelling catheter/lines hunter catheter, right forearm peripheral line. De-escalation none            Cierra Hutchison MD  ICU resident   [facility]  9/2/2020 6:19 AM    The critical care team assigned to the patient will be following up the patient in the intensive care unit. I have discussed the current plan with the critical care attending. The above mentioned assessment and plan will be reviewed again in detail by the

## 2020-09-02 NOTE — H&P
Matilde Pringle, Fabiola Olvera, Frank Martinez  Urology H&P      Patient:  Jc Shahid  MRN: [id number]  YOB: 1960    CHIEF COMPLAINT:  Left renal mass. HISTORY OF PRESENT ILLNESS:   The patient is a 56 y.o. male who presents with left renal mass seen previously by Dr Gwendolyn King and scheduled for surgery for removal of tumor on 9/18/20. However he developed nausea and emesis several days ago reports since 8/30. CT showed enlargement of the mass in the left kidney as well as right distal ureteral stone. He was managed for SBO with an NG tube and Dr Shima Vega was following at [facility]. He was not improving with tachycardia up to 120s. He underwent right ureteral stent placement with Dr Martinez on 9/1/20 and was transferred to [facility] for further management. He reports nausea had around 600 cc of bilious output from NG overnight. Rare flatus and no bowel movements. Had dull left flank pain that radiates to mid back 6/10 on pain scale. He denies hx of gross hematuria prior to having cysto and stent. Has a Bryant in place and denies dysuria. Patient's old records, notes and chart reviewed and summarized above.     Past Medical History:    Past Medical History:   Diagnosis Date    Back pain     Cellulitis     Diabetes mellitus ([address]     Gout     History of kidney cancer 08/10/2020    Recent diagnosis    Hypertension        Past Surgical History:    Past Surgical History:   Procedure Laterality Date    ANKLE SURGERY      CARPAL TUNNEL RELEASE Bilateral     KNEE SURGERY Bilateral        Medications:      Current Facility-Administered Medications:     vancomycin (VANCOCIN) intermittent dosing (placeholder), , Other, RX Placeholder, Delmer Collazo MD    metoprolol (LOPRESSOR) injection 5 mg, 5 mg, Intravenous, Q6H PRN, Delmer Collazo MD    vancomycin (VANCOCIN) 1750 mg in dextrose 5% 500 mL IVPB, 1,750 mg, Intravenous, Q24H, Delmer Collazo MD    cefepime (MAXIPIME) children: Not on file    Years of education: Not on file    Highest education level: Not on file   Occupational History    Not on file   Social Needs    Financial resource strain: Not on file    Food insecurity     Worry: Not on file     Inability: Not on file    Transportation needs     Medical: Not on file     Non-medical: Not on file   Tobacco Use    Smoking status: Never Smoker    Smokeless tobacco: Never Used   Substance and Sexual Activity    Alcohol use: Yes     Alcohol/week: 0.0 standard drinks     Comment: once a week    Drug use: No    Sexual activity: Not on file   Lifestyle    Physical activity     Days per week: Not on file     Minutes per session: Not on file    Stress: Not on file   Relationships    Social connections     Talks on phone: Not on file     Gets together: Not on file     Attends Nondenominational service: Not on file     Active member of club or organization: Not on file     Attends meetings of clubs or organizations: Not on file     Relationship status: Not on file    Intimate partner violence     Fear of current or ex partner: Not on file     Emotionally abused: Not on file     Physically abused: Not on file     Forced sexual activity: Not on file   Other Topics Concern    Not on file   Social History Narrative    Not on file       Family History:    Family History   Problem Relation Age of Onset    Stroke Maternal Grandmother     Stroke Maternal Grandfather        REVIEW OF SYSTEMS:  A comprehensive 14 point review of systems was obtained. Constitutional: + fatigue    Eyes: No blurry vision  Ears, nose, mouth, throat, face: No ringing in the ears; no facial droop. Respiratory: No cough or cold.   Cardiovascular: No palpitations  Gastrointestinal: + nausea and emesis  Genitourinary: No burning with urination, currently with hematuria  Integument/Skin: No rashes  Hematologic/Lymphatic: No easy bruising  Musculoskeletal: No muscle pains  Neurologic: No weakness in the extremities. Psychiatric: No depression or suicidal thoughts. Endocrine: No heat or cold intolerances. Allergic/Immunologic: No current seasonal allergies; no skin hives. Physical Exam:    This a 56 y.o. male   Vitals:    09/02/20 0500   BP: 127/82   Pulse: 126   Resp:    Temp:    SpO2: 97%     Constitutional: Patient in no acute distress. Neuro: alert and oriented to person place and time. Head: Atraumatic and normocephalic. Neck: Trachea midline   Ext: 2+ radial pulses bilaterally. Psych: Mood and affect normal.  Skin: No rashes or bruising present. Lungs: Respiratory effort normal.  Cardiovascular:  Regular rhythm. Abdomen: Soft, non-tender, minimally distended. Left side has CVA tenderness on exam.  Bladder non-tender and not distended. Lymphatics: no palpable lymphadenopathy.   Penis normal and circumcised  Urethral meatus normal  Scrotal exam normal  Testicles normal bilaterally  Epididymis normal bilaterally  No evidence of inguinal hernia   Bryant with light pink urine    Labs:  Recent Labs     08/31/20 2008 09/01/20  0830 09/02/20  0447   WBC 16.6* 22.0* 16.8*   HGB 10.9* 11.3* 10.4*   HCT 35.2* 35.7* 33.6*   MCV 86.1 86.0 87.3   * 514* 440     Recent Labs     09/01/20  1223 09/01/20  1659 09/01/20  2039 09/02/20  0447    140 140 140   K 4.4 3.9 4.0 4.2   CL 94* 95* 96* 100   CO2 30 31 27 24   PHOS 4.1 4.2 3.6  --    BUN 36* 34* 34* 33*   CREATININE 1.83* 1.66* 1.72* 1.58*       Recent Labs     09/02/20  0207   COLORU RED*   PHUR 8.5*   WBCUA TOO NUMEROUS TO COUNT   RBCUA TOO NUMEROUS TO COUNT   MUCUS NOT REPORTED   TRICHOMONAS NOT REPORTED   YEAST NOT REPORTED   BACTERIA NOT REPORTED   SPECGRAV 1.019   LEUKOCYTESUR LARGE*   UROBILINOGEN Normal   BILIRUBINUR NEGATIVE         No results found for: PSA       Culture Results:  UCx and blood culture x2 from 9/2/20 pending  UCx 8/10/20 neg growth      -----------------------------------------------------------------  Imaging Results:  Ct Abdomen Pelvis Wo Contrast    Result Date: 9/1/2020  EXAMINATION: CT OF THE ABDOMEN AND PELVIS WITHOUT CONTRAST 8/31/2020 3:20 pm TECHNIQUE: CT of the abdomen and pelvis was performed without the administration of intravenous contrast. Multiplanar reformatted images are provided for review. Dose modulation, iterative reconstruction, and/or weight based adjustment of the mA/kV was utilized to reduce the radiation dose to as low as reasonably achievable. COMPARISON: 8/21/2020 HISTORY: ORDERING SYSTEM PROVIDED HISTORY: Abdominal Pain TECHNOLOGIST PROVIDED HISTORY: Abdominal Pain Reason for Exam: Pt states vomiting since last night. Pt states known left renal mass to be removed in 2 days. Acuity: Acute Type of Exam: Initial FINDINGS: Lower Chest: No acute infiltrate at the lung bases. Organs: The unenhanced liver, spleen, pancreas and adrenal glands are unremarkable. No acute biliary findings. The right kidney is unremarkable. There is an infiltrative mass in the lower pole of the left kidney, previously partially visualized on CT of the right hip. This measures approximately 4.8 x 6.9 x 9.9 cm. The mass infiltrates the mesentery and results in proximal small bowel obstruction. There is a small middle pole left renal cyst.  Nonobstructive lower pole calculus measuring 6 x 12 mm. GI/Bowel: Marked gastric and duodenal distention. There is proximal small-bowel obstruction due to the infiltrative lower pole left renal mass. The distal small bowel is nondistended. No pericolonic inflammatory changes. Pelvis: Trace free pelvic fluid. No pelvic mass is identified. The prostate is not enlarged. Partial distention of the urinary bladder. Peritoneum/Retroperitoneum: The abdominal aorta is normal in caliber. 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 No pathologic retroperitoneal adenopathy. Bones/Soft Tissues: No acute osseous or soft tissue abnormality.   No lytic or blastic osseous lesions. Multilevel degenerative changes in the lower thoracic and lumbar spine. 1. Proximal small-bowel obstruction due to extension of an infiltrative lower pole left renal mass to the mesentery and adjacent small bowel. There is marked gastric distension. 2. Infiltrative lower pole left renal mass, presumed malignant measuring 4.8 x 6.9 x 9.9 cm. Nonobstructive left nephrolithiasis. 3. Trace free pelvic fluid. Findings were discussed with Traci Cabrera at 3:37 pm on 8/31/2020. Xr Acute Abd Series Chest 1 Vw    Result Date: 8/31/2020  EXAMINATION: TWO XRAY VIEWS OF THE ABDOMEN AND SINGLE  XRAY VIEW OF THE CHEST 8/31/2020 2:25 pm COMPARISON: None. HISTORY: ORDERING SYSTEM PROVIDED HISTORY: Pain TECHNOLOGIST PROVIDED HISTORY: Pain Reason for Exam: pt states vomiting and abdomen pain x 2 days Acuity: Acute Type of Exam: Initial FINDINGS: The cardiomediastinal silhouette is unremarkable. The lungs are clear. No infiltrate, pleural fluid or focal process is seen. Moderate aortic tortuosity, particularly involving the ascending aorta. 5 x 11 mm calcification on the left at the L3 vertebral level, possibly within the left renal pelvis or proximal left ureter. No other plain film evidence of renal or ureteral calculi. Bowel gas pattern is nonspecific. No focally distended loops, air-fluid levels or free air. Mild gastric distension. Multilevel osteoarthritic spurring in the lumbar spine. No acute cardiopulmonary disease. 5 x 11 mm calcification, possibly a renal or proximal ureteral calculus. Nonspecific bowel-gas pattern.      Xr Abdomen For Ng/og/ne Tube Placement    Result Date: 9/1/2020  EXAMINATION: ONE SUPINE XRAY VIEW(S) OF THE ABDOMEN 9/1/2020 6:26 am COMPARISON: 08/31/2020 HISTORY: ORDERING SYSTEM PROVIDED HISTORY: Confirmation of course of NG/OG/NE tube and location of tip of tube TECHNOLOGIST PROVIDED HISTORY: Confirmation of course of NG/OG/NE tube and location of tip of tube Portable? ->Yes Reason for Exam: ng placement Acuity: Unknown Type of Exam: Unknown FINDINGS: The nasogastric tube proximal side port is in the proximal stomach. The distal tip is in the mid gastric body. There is no evidence of bowel obstruction. There is a stable left renal calculus. No acute osseous abnormality is seen. The distal end of the nasogastric tube is in the mid gastric body. CT abd/pel 8/31/20   - dilated small bowel  - 6.6 x 9.9 cm left lower pole renal mass, radiologist raises concern for infiltration of jejunal mesentery  - right distal ureteral stone      The images were reviewed personally by me as well as the radiology report. Assessment and Plan   Impression:    Ignacio Mojica II is a 56 y.o. male with   Problem(s):  - 9.9 cm left lower pole renal mass  - small bowel obstruction likely secondary to mesenteric invasion of tumor  - acute kidney injury on CKD stage IIIA (baseline Cr 1.4)  - right distal ureteral stone s/p cystoscopy and right ureteral stent placement by Dr Lashaun Almanzar  - platelet dysfunction on aspirin 81 mg last dose 8/28/20  - comorbid conditions: diabetes mellitus type 2, HTN, obesity and chronic kidney disease. Plan:   - COVID 19 test neg 9/1.  -NPO and NG tube in place, appreciate general surgery recommendations  -Follow up  UCx and blood culture x2 from 9/2/20 pending,   blood culture x2 from 8/31/20 neg growth at 22 hr  -Antibiotics Empiric Cefepime and Vancomycin  -Appreciate ICU/medicine management and clearance for general anesthesia. Plan for Left partial vs radical nephrectomy tentatively on 9/4/20, possibly more urgently if he becomes unstable or peritonitic. Will consider robotic approproach if ammenable otherwise will need open surgery   - maintain Bryant  - Liver panel 8/31 with normal albumin 3.6 and normal alk phos 104.       Chapin Jones  5:53 AM 9/2/2020

## 2020-09-02 NOTE — PROGRESS NOTES
Pharmacy Note  Vancomycin Consult    Salome Monroe II is a 56 y.o. male started on Vancomycin for potential sepsis, empiric coverage; consult received from Dr. Melinda Quinn to manage therapy. Also receiving cefepime. Patient Active Problem List   Diagnosis    Cellulitis    Type 2 diabetes mellitus without complication ([facility] [address]    Essential hypertension    Morbid obesity due to excess calories ([facility] [address]    Chronic kidney disease (CKD)    DEB[facility] (acute [facility] injury) ([facility]    Bowel obstruction (HCC)    Small bowel obstruction (HCC)       Allergies:  Ampicillin; Pcn [penicillins]; and Tape [adhesive tape]     Temp max: afebrile    Recent Labs     09/01/20 1659 09/01/20 2039   BUN 34* 34*       Recent Labs     09/01/20 1659 09/01/20 2039   CREATININE 1.66* 1.72*       Recent Labs     08/31/20 2008 09/01/20  0830   WBC 16.6* 22.0*       No intake or output data in the 24 hours ending 09/02/20 0013    Culture Date      Source                       Results   Pending    Ht Readings from Last 1 Encounters:   09/01/20 5' 11\" (1.803 m)        Wt Readings from Last 1 Encounters:   09/01/20 257 lb 0.9 oz (116.6 kg)         Body mass index is 35.85 kg/m². Estimated Creatinine Clearance: 58 mL/min (A) (based on SCr of 1.72 mg/dL (H)). Goal Trough Level: 15-20 mcg/mL    Assessment/Plan:  Will initiate Vancomycin with a one time loading dose of 1000 mg x1 in addition to 1000 mg dose he received prior to consult for a total of 2000 mg tonight, followed by 1750 mg IV every 24 hours going forward. Timing of trough level will be determined based on culture results, renal function, and clinical response. Thank you for the consult. Will continue to follow. TADEO Camarillo, PharmD  9/2/2020 12:14 AM

## 2020-09-02 NOTE — ANESTHESIA PROCEDURE NOTES
Arterial Line:    An arterial line was placed using surface landmarks, in the OR for the following indication(s): continuous blood pressure monitoring and blood sampling needed. A 20 gauge (size), 1 and 3/4 inch (length), Arrow (type) catheter was placed, Seldinger technique used, into the left radial artery, secured by tape and Tegaderm. Anesthesia type: General    Events:  patient tolerated procedure well with no complications.   9/2/2020 1:30 PM9/2/2020 1:33 PM  Anesthesiologist: Darcie Yanez MD  Resident/CRNA: TORREY Gomez CRNA  Performed: Resident/CRNA   Preanesthetic Checklist  Completed: patient identified, site marked, surgical consent, pre-op evaluation, timeout performed, IV checked, risks and benefits discussed, monitors and equipment checked, anesthesia consent given, oxygen available and patient being monitored

## 2020-09-02 NOTE — ANESTHESIA POSTPROCEDURE EVALUATION
Department of Anesthesiology  Postprocedure Note    Patient: Paramjit Gaston  MRN: [id number]  YOB: 1960  Date of evaluation: 9/1/2020  Time:  9:43 PM     Procedure Summary     Date:  09/01/20 Room / Location:  [facility] - INPATIENT    Anesthesia Start:  2104 Anesthesia Stop:  2142    Procedure:  CYSTOSCOPY RIGHT URETERAL STENT INSERTION (Right Ureter) Diagnosis:  (RIGHT KIDNEY STONE)    Surgeon:  Coleman Martell MD Responsible Provider:      Anesthesia Type:  general ASA Status:  5 - Emergent          Anesthesia Type: general    Destiny Phase I:      Destiny Phase II:      Last vitals: Reviewed and per EMR flowsheets.        Anesthesia Post Evaluation    Patient location during evaluation: PACU  Patient participation: complete - patient participated  Level of consciousness: awake  Airway patency: patent  Nausea & Vomiting: no nausea  Complications: no  Cardiovascular status: blood pressure returned to baseline and tachycardic  Respiratory status: acceptable  Hydration status: euvolemic

## 2020-09-02 NOTE — PLAN OF CARE
Problem: OXYGENATION/RESPIRATORY FUNCTION  Goal: Patient will achieve/maintain normal respiratory rate/effort  Description: Respiratory rate and effort will be within normal limits for the patient  Outcome: Ongoing     Problem: MECHANICAL VENTILATION  Goal: Patient will maintain patent airway  Outcome: Ongoing  Goal: Oral health is maintained or improved  Outcome: Ongoing  Goal: ET tube will be managed safely  Outcome: Ongoing  Goal: Ability to express needs and understand communication  Outcome: Ongoing  Goal: Mobility/activity is maintained at optimum level for patient  Outcome: Ongoing

## 2020-09-02 NOTE — PROGRESS NOTES
General Surgery:  Daily Progress Note          PATIENT NAME: Krunal Nj II     TODAY'S DATE: 9/2/2020, 12:17 AM    SUBJECTIVE:     Pt seen and examined at bedside. Was transferred to [facility]. [facility] from [phone]. He states that he is not having much abdominal pain that this time. He is not having any nausea or emesis. He states that he passed some flatus during the night. Has not had any BMs. Patient is complaining of some tingling and burning in his arms. OBJECTIVE:   VITALS:  /79   Pulse 120   Temp 98.4 °F (36.9 °C) (Oral)   Resp 14   Ht 5' 11\" (1.803 m)   Wt 257 lb 0.9 oz (116.6 kg)   SpO2 98%   BMI 35.85 kg/m²      INTAKE/OUTPUT:    No intake or output data in the 24 hours ending 09/02/20 0017    PHYSICAL EXAM:  General Appearance: Awake, alert, oriented, mild distress  HEENT:  Normocephalic, atraumatic, mucus membranes moist. NGT in place  Heart: Tachycardic, regular rhythm  Lungs: No respiratory distress  Abdomen: Soft, distended, minimal tenderness to palpation generally  Extremities: No cyanosis, pitting edema, rashes noted. Skin: Skin color, texture, turgor normal. No rashes or lesions.       Data:  CBC:   Recent Labs     08/31/20  1354 08/31/20 2008 09/01/20  0830   WBC 20.0* 16.6* 22.0*   HGB 12.0* 10.9* 11.3*    470* 514*     Chemistry:   Recent Labs     09/01/20  0455  09/01/20  1223 09/01/20  1440 09/01/20  1659 09/01/20  2039      < > 138  --  140 140   K 4.0   < > 4.4  --  3.9 4.0   CL 95*   < > 94*  --  95* 96*   CO2 28   < > 30  --  31 27   GLUCOSE 186*   < > 201*  --  136* 163*   BUN 36*   < > 36*  --  34* 34*   CREATININE 1.76*   < > 1.83*  --  1.66* 1.72*   MG 1.8   < > 1.9  --  1.8 1.6   ANIONGAP 14   < > 14  --  14 17   LABGLOM 39*   < > 38*  --  42* 40*   GFRAA 48*   < > 46*  --  51* 49*   CALCIUM 9.9   < > 9.9  --  9.5 9.2   PHOS 3.7   < > 4.1  --  4.2 3.6   TROPHS 17  --   --  14  --  15   MYOGLOBIN 694*  --   --  439*  --  291*    < > = values in this interval not displayed. Hepatic:   Recent Labs     08/31/20  1354   AST 21   ALT 23   ALKPHOS 104   BILITOT 0.50   BILIDIR 0.14     Coagulation:   Recent Labs     08/31/20  1354   PROT 9.3*         Radiology Review:    Xr Abdomen (kub) (single Ap View)    Addendum Date: 9/1/2020    ADDENDUM: Dose area product 1037.1 mGy per cm squared and fluoro time 5.6 seconds     Result Date: 9/1/2020  EXAMINATION: ONE SUPINE XRAY VIEW(S) OF THE ABDOMEN 9/1/2020 9:32 pm COMPARISON: 16 hours ago HISTORY: ORDERING SYSTEM PROVIDED HISTORY: cysto with right stent TECHNOLOGIST PROVIDED HISTORY: cysto with right stent Reason for Exam: cysto Acuity: Acute Type of Exam: Initial FINDINGS: Double-J ureteral stent present. On the contralateral side there appears to be a nephrolith. Please correlate with clinical service     Xr Chest Portable    Result Date: 9/1/2020  EXAMINATION: ONE XRAY VIEW OF THE CHEST 9/1/2020 8:31 pm COMPARISON: 05/14/2010 HISTORY: ORDERING SYSTEM PROVIDED HISTORY: evaluation TECHNOLOGIST PROVIDED HISTORY: evaluation Reason for Exam: evaluation Acuity: Acute Type of Exam: Initial FINDINGS: Cardiomediastinal silhouette is unchanged in size. No pulmonary consolidation, pleural effusion, or pneumothorax. No acute osseous abnormality. Enteric tube is below the diaphragm with tip outside the field of view. No acute cardiopulmonary abnormality. Xr Abdomen For Ng/og/ne Tube Placement    Result Date: 9/1/2020  EXAMINATION: ONE SUPINE XRAY VIEW(S) OF THE ABDOMEN 9/1/2020 6:26 am COMPARISON: 08/31/2020 HISTORY: ORDERING SYSTEM PROVIDED HISTORY: Confirmation of course of NG/OG/NE tube and location of tip of tube TECHNOLOGIST PROVIDED HISTORY: Confirmation of course of NG/OG/NE tube and location of tip of tube Portable? ->Yes Reason for Exam: ng placement Acuity: Unknown Type of Exam: Unknown FINDINGS: The nasogastric tube proximal side port is in the proximal stomach.   The distal tip is in the mid gastric body.  There is no evidence of bowel obstruction. There is a stable left renal calculus. No acute osseous abnormality is seen. The distal end of the nasogastric tube is in the mid gastric body. Fluoro For Surgical Procedures    Result Date: 9/1/2020  Radiology exam is complete. No Radiologist dictation. Please follow up with ordering provider. ASSESSMENT:  Active Hospital Problems    Diagnosis Date Noted    Small bowel obstruction ([facility] [address] [X26.989] 09/01/2020       1. 56 y.o. male with bowel obstruction secondary to renal mass      Plan:  1. Continue medical management and supportive care per primary team  2. Continue NG to LIWS  3. Would suggest PICC and TPN  4. ABx per primary  5.  F/u Urology recs    Electronically signed by Vernelle Gaucher, DO  on 9/2/2020 at 12:17 AM

## 2020-09-02 NOTE — PLAN OF CARE
Problem: Skin Integrity:  Goal: Will show no infection signs and symptoms  Description: Will show no infection signs and symptoms  Outcome: Ongoing     Problem: Skin Integrity:  Goal: Absence of new skin breakdown  Description: Absence of new skin breakdown  Outcome: Ongoing     Problem: Falls - Risk of:  Goal: Will remain free from falls  Description: Will remain free from falls  Outcome: Ongoing  Goal: Absence of physical injury  Description: Absence of physical injury  Outcome: Ongoing     Problem: Discharge Planning:  Goal: Discharged to appropriate level of care  Description: Discharged to appropriate level of care  Outcome: Not Met This Shift     Problem: Infection, Septic Shock:  Goal: Will show no infection signs and symptoms  Description: Will show no infection signs and symptoms  Outcome: Ongoing     Problem: Serum Glucose Level - Abnormal:  Goal: Ability to maintain appropriate glucose levels will improve  Description: Ability to maintain appropriate glucose levels will improve  Outcome: Ongoing

## 2020-09-02 NOTE — ANESTHESIA PRE PROCEDURE
Department of Anesthesiology  Preprocedure Note       Name:  Mallory Menendez II   Age:  56 y.o.  :  1960                                          MRN:  [id number]         Date:  2020      Surgeon: Juli Hassan):  MD Rodrick Pillai DO    Procedure: Procedure(s):  LAPAROSCOPIC XI ROBOTIC NEPHRECTOMY, POSSIBLE OPEN  BOWEL RESECTION    Medications prior to admission:   Prior to Admission medications    Medication Sig Start Date End Date Taking? Authorizing Provider   traMADol (ULTRAM) 50 MG tablet Take 100 mg by mouth every 6 hours as needed for Pain.     Historical Provider, MD   Melatonin 10 MG TABS Take 10 mg by mouth nightly as needed (SLEEP)    Historical Provider, MD   vitamin C (ASCORBIC ACID) 500 MG tablet Take 500 mg by mouth daily    Historical Provider, MD   lidocaine 4 % external patch Place 1 patch onto the skin daily  Patient taking differently: Place 1 patch onto the skin daily as needed (TO HIP)  8/11/20 9/10/20  Dorothy Hightower MD   acetaminophen (TYLENOL) 500 MG tablet Take 2 tablets by mouth every 6 hours as needed for Pain 20  Dorothy Hightower MD   cyclobenzaprine (FLEXERIL) 10 MG tablet Take 1 tablet by mouth 3 times daily as needed for Muscle spasms  Patient taking differently: Take 10 mg by mouth 2 times daily as needed for Muscle spasms  20   Dorothy Hightower MD   aspirin 81 MG tablet Take 81 mg by mouth nightly     Historical Provider, MD   SITagliptin (JANUVIA) 50 MG tablet Take 25 mg by mouth daily     Historical Provider, MD   pravastatin (PRAVACHOL) 20 MG tablet Take 20 mg by mouth daily    Historical Provider, MD   metoprolol (LOPRESSOR) 50 MG tablet Take 50 mg by mouth 2 times daily    Historical Provider, MD   lisinopril (PRINIVIL;ZESTRIL) 20 MG tablet Take 20 mg by mouth daily    Historical Provider, MD   allopurinol (ZYLOPRIM) 100 MG tablet Take 100 mg by mouth daily    Historical Provider, MD   zolpidem (AMBIEN) 10 MG tablet Take by 10 mL  10 mL Intravenous 2 times per day Rose Puri MD        sodium chloride flush 0.9 % injection 10 mL  10 mL Intravenous PRN Rose Puri MD        0.9 % sodium chloride infusion   Intravenous Continuous Rose Puri  mL/hr at 09/02/20 0017      sodium chloride flush 0.9 % injection 10 mL  10 mL Intravenous 2 times per day Guillermo Begum MD   10 mL at 09/02/20 0005    sodium chloride flush 0.9 % injection 10 mL  10 mL Intravenous PRN Matthew Prescott MD        promethazine (PHENERGAN) tablet 12.5 mg  12.5 mg Oral Q6H PRN Matthew Hyatt MD        Or    ondansetron (ZOFRAN) injection 4 mg  4 mg Intravenous Q6H PRN Matthew Hyatt MD        famotidine (PEPCID) injection 20 mg  20 mg Intravenous BID Matthew Hyatt MD   20 mg at 09/02/20 6710       Allergies: Allergies   Allergen Reactions    Ampicillin Swelling     Swelling of throat.  Pcn [Penicillins] Swelling     Throat swelling. Tolerated cefepime during 8/31/20 admission.  Tape Alvena Copier Tape]        Problem List:    Patient Active Problem List   Diagnosis Code    Cellulitis L03.90    Type 2 diabetes mellitus without complication ([address] W04.7    Essential hypertension I10    Morbid obesity due to excess calories (HCC) E66.01    Chronic kidney disease (CKD) N18.9    DEBBI (acute kidney injury) ([address] N17.9    Bowel obstruction ([address] K56.609    Small bowel obstruction ([address] K56.609       Past Medical History:        Diagnosis Date    Back pain     Cellulitis     Diabetes mellitus ([address]     Gout     History of kidney cancer 08/10/2020    Recent diagnosis    Hypertension        Past Surgical History:        Procedure Laterality Date    ANKLE SURGERY      CARPAL TUNNEL RELEASE Bilateral     KNEE SURGERY Bilateral        Social History:    Social History     Tobacco Use    Smoking status: Never Smoker    Smokeless tobacco: Never Used   Substance Use Topics    Alcohol use:  Yes     Alcohol/week: 0.0 standard drinks Comment: once a week                                Counseling given: Not Answered      Vital Signs (Current):   Vitals:    09/02/20 0700 09/02/20 0730 09/02/20 0800 09/02/20 0900   BP:   124/77 135/72   Pulse: 107  107 107   Resp: 13 16 18   Temp:  97.6 °F (36.4 °C)     TempSrc:  Oral     SpO2: 96%      Weight:       Height:                                                  BP Readings from Last 3 Encounters:   09/02/20 135/72   09/01/20 112/87   09/01/20 (!) 144/81       NPO Status:                                                                                 BMI:   Wt Readings from Last 3 Encounters:   09/01/20 257 lb 0.9 oz (116.6 kg)   08/31/20 255 lb (115.7 kg)   08/10/20 263 lb (119.3 kg)     Body mass index is 35.85 kg/m².     CBC:   Lab Results   Component Value Date    WBC 16.8 09/02/2020    RBC 3.85 09/02/2020    HGB 10.4 09/02/2020    HCT 33.6 09/02/2020    MCV 87.3 09/02/2020    RDW 13.0 09/02/2020     09/02/2020       CMP:   Lab Results   Component Value Date     09/02/2020    K 4.2 09/02/2020     09/02/2020    CO2 24 09/02/2020    BUN 33 09/02/2020    CREATININE 1.58 09/02/2020    GFRAA 54 09/02/2020    LABGLOM 45 09/02/2020    GLUCOSE 256 09/02/2020    PROT 7.9 09/02/2020    CALCIUM 8.7 09/02/2020    BILITOT 0.43 09/02/2020    ALKPHOS 92 09/02/2020    AST 47 09/02/2020    ALT 24 09/02/2020       POC Tests:   Recent Labs     09/02/20  0837   POCGLU 230*       Coags: No results found for: PROTIME, INR, APTT    HCG (If Applicable): No results found for: PREGTESTUR, PREGSERUM, HCG, HCGQUANT     ABGs: No results found for: PHART, PO2ART, BUW0WBW, QPY4BDI, BEART, E3BIXEDE     Type & Screen (If Applicable):  No results found for: LABABO, LABRH    Drug/Infectious Status (If Applicable):  No results found for: HIV, HEPCAB    COVID-19 Screening (If Applicable):   Lab Results   Component Value Date    COVID19 Not Detected 09/01/2020    COVID19 Not Detected 06/09/2020         Anesthesia Evaluation  Patient summary reviewed and Nursing notes reviewed no history of anesthetic complications:   Airway: Mallampati: II  TM distance: >3 FB   Neck ROM: full  Mouth opening: > = 3 FB Dental: normal exam         Pulmonary:normal exam  breath sounds clear to auscultation      (-) COPD, asthma, shortness of breath, recent URI and sleep apnea                           Cardiovascular:  Exercise tolerance: good (>4 METS),   (+) hypertension:, hyperlipidemia    (-) past MI, CAD, CABG/stent,  angina,  CHF, orthopnea and  HUGHES      Rhythm: regular  Rate: normal  Echocardiogram reviewed                  Neuro/Psych:   Negative Neuro/Psych ROS     (-) seizures, TIA and CVA           GI/Hepatic/Renal:   (+) renal disease: CRI and ARF, morbid obesity     (-) GERD      ROS comment: L renal mass, SBO  . Endo/Other:    (+) DiabetesType II DM, , : arthritis:., malignancy/cancer (L renal mass). Abdominal:           Vascular: negative vascular ROS. TTE 8/31/2020  Left ventricle is normal in size. Mild left ventricular hypertrophy. Global left ventricular systolic function is normal with an estimated ejection fraction of 60 % . Due to the technical limitations of this study not all wall segments were visualized. No significant valvular regurgitation or stenosis seen. Large pleural effusion. Anesthesia Plan      general     ASA 3     (GA ET  PIV x 2  )  Induction: intravenous and rapid sequence. arterial line  MIPS: Postoperative opioids intended and Prophylactic antiemetics administered. Anesthetic plan and risks discussed with patient.       Plan discussed with CRNA and surgical team.                  Chadd Allred MD   9/2/2020

## 2020-09-02 NOTE — CONSULTS
2 g IVPB minibag, 2 g, Intravenous, Q12H, Juan Yousif MD, Last Rate: 12.5 mL/hr at 09/02/20 0225, 2 g at 09/02/20 0225    glucose (GLUTOSE) 40 % oral gel 15 g, 15 g, Oral, PRN, Juan Yousif MD    dextrose 50 % IV solution, 12.5 g, Intravenous, PRN, Juan Yousif MD    glucagon (rDNA) injection 1 mg, 1 mg, Intramuscular, PRN, Juan Yousif MD    dextrose 5 % solution, 100 mL/hr, Intravenous, PRN, Juan Yousif MD    heparin (porcine) injection 5,000 Units, 5,000 Units, Subcutaneous, 3 times per day, Juan Yousif MD, 5,000 Units at 09/02/20 0546    0.9 % sodium chloride bolus, 500 mL, Intravenous, Once, Juan Yousif MD, Last Rate: 250 mL/hr at 09/02/20 0540, 500 mL at 09/02/20 0540    insulin glargine (LANTUS) injection vial 20 Units, 20 Units, Subcutaneous, Once, Juna Yousif MD    insulin lispro (HUMALOG) injection vial 0-18 Units, 0-18 Units, Subcutaneous, TID WC, Juan Yousif MD    insulin lispro (HUMALOG) injection vial 0-9 Units, 0-9 Units, Subcutaneous, Nightly, Juan Yousif MD    0.9 % sodium chloride infusion, , Intravenous, Continuous, Juan Yousif MD, Last Rate: 150 mL/hr at 09/02/20 0017    sodium chloride flush 0.9 % injection 10 mL, 10 mL, Intravenous, 2 times per day, Loren Thompson MD, 10 mL at 09/02/20 0005    sodium chloride flush 0.9 % injection 10 mL, 10 mL, Intravenous, PRN, Matthew Falk MD    promethazine (PHENERGAN) tablet 12.5 mg, 12.5 mg, Oral, Q6H PRN **OR** ondansetron (ZOFRAN) injection 4 mg, 4 mg, Intravenous, Q6H PRN, Matthew Hyatt MD    famotidine (PEPCID) injection 20 mg, 20 mg, Intravenous, BID, Matthew Hyatt MD, 20 mg at 09/02/20 0005    Allergies: Allergies   Allergen Reactions    Ampicillin Swelling     Swelling of throat.  Pcn [Penicillins] Swelling     Throat swelling. Tolerated cefepime during 8/31/20 admission.     Tape Leahre Carolyn Tape]        Social History:   Social History     Socioeconomic History  Marital status:      Spouse name: Not on file    Number of children: Not on file    Years of education: Not on file    Highest education level: Not on file   Occupational History    Not on file   Social Needs    Financial resource strain: Not on file    Food insecurity     Worry: Not on file     Inability: Not on file    Transportation needs     Medical: Not on file     Non-medical: Not on file   Tobacco Use    Smoking status: Never Smoker    Smokeless tobacco: Never Used   Substance and Sexual Activity    Alcohol use: Yes     Alcohol/week: 0.0 standard drinks     Comment: once a week    Drug use: No    Sexual activity: Not on file   Lifestyle    Physical activity     Days per week: Not on file     Minutes per session: Not on file    Stress: Not on file   Relationships    Social connections     Talks on phone: Not on file     Gets together: Not on file     Attends Oriental orthodox service: Not on file     Active member of club or organization: Not on file     Attends meetings of clubs or organizations: Not on file     Relationship status: Not on file    Intimate partner violence     Fear of current or ex partner: Not on file     Emotionally abused: Not on file     Physically abused: Not on file     Forced sexual activity: Not on file   Other Topics Concern    Not on file   Social History Narrative    Not on file       Family History:    Family History   Problem Relation Age of Onset    Stroke Maternal Grandmother     Stroke Maternal Grandfather        REVIEW OF SYSTEMS:  A comprehensive 14 point review of systems was obtained. Constitutional: + fatigue    Eyes: No blurry vision  Ears, nose, mouth, throat, face: No ringing in the ears; no facial droop. Respiratory: No cough or cold.   Cardiovascular: No palpitations  Gastrointestinal: + nausea and emesis  Genitourinary: No burning with urination, currently with hematuria  Integument/Skin: No rashes  Hematologic/Lymphatic: No easy bruising  Musculoskeletal: No muscle pains  Neurologic: No weakness in the extremities. Psychiatric: No depression or suicidal thoughts. Endocrine: No heat or cold intolerances. Allergic/Immunologic: No current seasonal allergies; no skin hives. Physical Exam:    This a 56 y.o. male   Vitals:    09/02/20 0500   BP: 127/82   Pulse: 126   Resp:    Temp:    SpO2: 97%     Constitutional: Patient in no acute distress. Neuro: alert and oriented to person place and time. Head: Atraumatic and normocephalic. NG tube in place with bilious output  Neck: Trachea midline   Ext: 2+ radial pulses bilaterally. Psych: Mood and affect normal.  Skin: No rashes or bruising present. Lungs: Respiratory effort normal.  Cardiovascular:  Tachycardic, palpable pulse  Abdomen: Soft, non-tender, minimally distended. Left side has CVA tenderness on exam.  Bladder non-tender and not distended. Lymphatics: no palpable lymphadenopathy.   Penis normal and circumcised  Urethral meatus normal  Scrotal exam normal  Testicles normal bilaterally  Epididymis normal bilaterally  No evidence of inguinal hernia   Bryant with light pink urine    Labs:  Recent Labs     08/31/20 2008 09/01/20  0830 09/02/20  0447   WBC 16.6* 22.0* 16.8*   HGB 10.9* 11.3* 10.4*   HCT 35.2* 35.7* 33.6*   MCV 86.1 86.0 87.3   * 514* 440     Recent Labs     09/01/20  1223 09/01/20  1659 09/01/20  2039 09/02/20  0447    140 140 140   K 4.4 3.9 4.0 4.2   CL 94* 95* 96* 100   CO2 30 31 27 24   PHOS 4.1 4.2 3.6  --    BUN 36* 34* 34* 33*   CREATININE 1.83* 1.66* 1.72* 1.58*       Recent Labs     09/02/20  0207   COLORU RED*   PHUR 8.5*   WBCUA TOO NUMEROUS TO COUNT   RBCUA TOO NUMEROUS TO COUNT   MUCUS NOT REPORTED   TRICHOMONAS NOT REPORTED   YEAST NOT REPORTED   BACTERIA NOT REPORTED   SPECGRAV 1.019   LEUKOCYTESUR LARGE*   UROBILINOGEN Normal   BILIRUBINUR NEGATIVE         No results found for: PSA       Culture Results:  UCx and blood culture x2 from 9/2/20 pending  UCx 8/10/20 neg growth      -----------------------------------------------------------------  Imaging Results:  Ct Abdomen Pelvis Wo Contrast    Result Date: 9/1/2020  EXAMINATION: CT OF THE ABDOMEN AND PELVIS WITHOUT CONTRAST 8/31/2020 3:20 pm TECHNIQUE: CT of the abdomen and pelvis was performed without the administration of intravenous contrast. Multiplanar reformatted images are provided for review. Dose modulation, iterative reconstruction, and/or weight based adjustment of the mA/kV was utilized to reduce the radiation dose to as low as reasonably achievable. COMPARISON: 8/21/2020 HISTORY: ORDERING SYSTEM PROVIDED HISTORY: Abdominal Pain TECHNOLOGIST PROVIDED HISTORY: Abdominal Pain Reason for Exam: Pt states vomiting since last night. Pt states known left renal mass to be removed in 2 days. Acuity: Acute Type of Exam: Initial FINDINGS: Lower Chest: No acute infiltrate at the lung bases. Organs: The unenhanced liver, spleen, pancreas and adrenal glands are unremarkable. No acute biliary findings. The right kidney is unremarkable. There is an infiltrative mass in the lower pole of the left kidney, previously partially visualized on CT of the right hip. This measures approximately 4.8 x 6.9 x 9.9 cm. The mass infiltrates the mesentery and results in proximal small bowel obstruction. There is a small middle pole left renal cyst.  Nonobstructive lower pole calculus measuring 6 x 12 mm. GI/Bowel: Marked gastric and duodenal distention. There is proximal small-bowel obstruction due to the infiltrative lower pole left renal mass. The distal small bowel is nondistended. No pericolonic inflammatory changes. Pelvis: Trace free pelvic fluid. No pelvic mass is identified. The prostate is not enlarged. Partial distention of the urinary bladder. Peritoneum/Retroperitoneum: The abdominal aorta is normal in caliber. 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   No pathologic tip of tube TECHNOLOGIST PROVIDED HISTORY: Confirmation of course of NG/OG/NE tube and location of tip of tube Portable? ->Yes Reason for Exam: ng placement Acuity: Unknown Type of Exam: Unknown FINDINGS: The nasogastric tube proximal side port is in the proximal stomach. The distal tip is in the mid gastric body. There is no evidence of bowel obstruction. There is a stable left renal calculus. No acute osseous abnormality is seen. The distal end of the nasogastric tube is in the mid gastric body. CT abd/pel 8/31/20   - dilated small bowel  - 6.6 x 9.9 cm left lower pole renal mass, radiologist raises concern for infiltration of jejunal mesentery  - right distal ureteral stone      The images were reviewed personally by me as well as the radiology report. Assessment and Plan   Impression:    Ciara Louie II is a 56 y.o. male with   Problem(s):  - 9.9 cm left lower pole renal mass  - small bowel obstruction likely secondary to mesenteric invasion of tumor  - acute kidney injury on CKD stage IIIA (baseline Cr 1.4)  - right distal ureteral stone s/p cystoscopy and right ureteral stent placement by Dr Petar Hill  - platelet dysfunction on aspirin 81 mg last dose 8/28/20  - comorbid conditions: diabetes mellitus type 2, HTN, obesity and chronic kidney disease. Plan:   - COVID 19 test neg 9/1.  -NPO and NG tube in place, appreciate general surgery recommendations and agree with TPN given their recommendation.  -Follow up  UCx and blood culture x2 from 9/2/20 pending,   blood culture x2 from 8/31/20 neg growth at 22 hr  -Antibiotics Empiric Cefepime and Vancomycin  -Appreciate ICU/medicine management and clearance for general anesthesia. Plan for Left partial vs radical nephrectomy tentatively on 9/4/20, possibly more urgently if he becomes unstable or peritonitic.  Will consider robotic approproach if ammenable otherwise will need open surgery   - maintain Bryant  - Liver panel 8/31 with normal albumin 3.6 and normal alk phos 104.       Cuate Exon  6:22 AM 9/2/2020

## 2020-09-02 NOTE — PLAN OF CARE
Nutrition Problem #1: Inadequate oral intake  Intervention: Food and/or Nutrient Delivery: Continue NPO(Start nutrition as able.  Monitor plans re:PICC placement/ initiation of parenteral nutrition support.)  Nutritional Goals: meet % of estimated nutrition needs

## 2020-09-02 NOTE — PROGRESS NOTES
Dr. Girma Neely notified that patient's blood sugar is 205, and that patient received Lantus 20 units this AM at [phone], no new orders received

## 2020-09-02 NOTE — ANESTHESIA POSTPROCEDURE EVALUATION
Department of Anesthesiology  Postprocedure Note    Patient: Merline Card  MRN: [id number]  YOB: 1960  Date of evaluation: 9/2/2020  Time:  6:00 PM     Procedure Summary     Date:  09/02/20 Room / Location:  [address]    Anesthesia Start:  1316 Anesthesia Stop:  1758    Procedures:       LAPAROSCOPIC XI ROBOTIC NEPHRECTOMY (Left )      EXPLORATORY LAPAROTOMY BOWEL RESECTION (N/A ) Diagnosis:  (LEFT RENAL MASS)    Surgeon:  Lashaun Sanchez MD; Barrera Holcomb DO Responsible Provider:  Alan Bear MD    Anesthesia Type:  general ASA Status:  3          Anesthesia Type: general    Destiny Phase I:      Destiny Phase II:      Last vitals: Reviewed and per EMR flowsheets.        Anesthesia Post Evaluation    Patient location during evaluation: ICU  Patient participation: complete - patient cannot participate  Level of consciousness: sedated and ventilated  Pain score: 0  Airway patency: patent  Nausea & Vomiting: no nausea and no vomiting  Complications: no  Cardiovascular status: blood pressure returned to baseline  Respiratory status: intubated  Hydration status: euvolemic  Comments:   Mechanical Ventilation Data  SETTINGS (Comprehensive)  Vent Information  SpO2: 96 %  Additional Respiratory  Assessments  Pulse: 108  Resp: 13  SpO2: 96 %    ABG   No results found for: PH, PCO2, PO2, HCO3, O2SAT  No results found for: IFIO2, MODE, SETTIDVOL, SETPEEP

## 2020-09-02 NOTE — H&P
Attending Physician Statement  I have discussed the case of Sunita Ricci II, including pertinent history and exam findings with the resident/fellow/NP/PA. I have seen and examined the patient and the key elements of the encounter have been performed by me. I agree with the assessment, plan and orders as documented by the resident/fellow/NP/PA  With changes made to the note as needed. Pt was seen during rounds. Review of Systems:   In addition to the pertinent positives and negatives as stated within HPI and the review of systems as documented in their notes, all other systems were reviewed when able to and are reported negative. Patient admitted with a chief complaint cough  Small bowel obstruction related to a malignant left renal mass. -General surgery and urology consultations to be obtained and followed. Patient had an NG tube in place    Dehydration secondary to upper GI losses-hydrate with normal saline and monitor urine output and kidney function    Leukocytosis. Could be sepsis due to UTI or due to bowel obstruction-monitor the situation as the patient's right ureteral obstruction is relieved. Continue the antibiotics and follow-up on the cultures and adjust them accordingly    Sinus tachycardia secondary to dehydration-continue to monitor the heart rate as patient is rehydrated    Right ureteral calculus s/p stenting at [facility].  Patient also with left renal calculi-continue to monitor the situation. Patient to go to operating room today    Type 2 diabetes mellitus-monitor blood sugars and cover with insulin    Essential hypertension-monitor blood pressure and cover with medications as appropriate.   For now we will hold off on antihypertensives    Obesity-weight loss would be recommended    Acute kidney injury on chronic kidney disease stage III secondary to upper gastrointestinal fluid losses with dehydration-hydrate patient and monitor urine output and kidney function    History

## 2020-09-02 NOTE — H&P
I have discussed the care of this patient including pertinent history and exam findings, with the resident. I have seen and examined the patient and the key elements of all parts of the encounter have been performed by me. I agree with the assessment, plan and orders as documented by the resident. Lisa Gallegos, [address] [place] [address], Trip Wolff, Toby Cano  Urology H&P      Patient:  Boris Zavala  MRN: [id number]  YOB: 1960    CHIEF COMPLAINT:  Left renal mass. HISTORY OF PRESENT ILLNESS:   The patient is a 56 y.o. male who presents with left renal mass seen previously by Dr Kellie Ramirez and scheduled for surgery for removal of tumor on 9/18/20. However he developed nausea and emesis several days ago reports since 8/30. CT showed enlargement of the mass in the left kidney as well as right distal ureteral stone. He was managed for SBO with an NG tube and Dr Jacinta Vela was following at [facility]. He was not improving with tachycardia up to 120s. He underwent right ureteral stent placement with Dr Trip Wolff on 9/1/20 and was transferred to [facility] for further management. He reports nausea had around 600 cc of bilious output from NG overnight. Rare flatus and no bowel movements. Had dull left flank pain that radiates to mid back 6/10 on pain scale. He denies hx of gross hematuria prior to having cysto and stent. Has a Bryant in place and denies dysuria. Patient's old records, notes and chart reviewed and summarized above.     Past Medical History:    Past Medical History:   Diagnosis Date    Back pain     Cellulitis     Diabetes mellitus ([address]     Gout     History of kidney cancer 08/10/2020    Recent diagnosis    Hypertension        Past Surgical History:    Past Surgical History:   Procedure Laterality Date    ANKLE SURGERY      CARPAL TUNNEL RELEASE Bilateral     CYSTOSCOPY Right 9/1/2020    CYSTOSCOPY RIGHT URETERAL STENT INSERTION performed by Flash Reyna MD at [facility]    KNEE SURGERY Bilateral        Medications:      Current Facility-Administered Medications:     [MAR Hold] vancomycin (VANCOCIN) intermittent dosing (placeholder), , Other, RX Placeholder, Ari Garrett MD    [MAR Hold] metoprolol (LOPRESSOR) injection 5 mg, 5 mg, Intravenous, Q6H PRN, Ari Garrett MD    [MAR Hold] vancomycin (VANCOCIN) 1750 mg in dextrose 5% 500 mL IVPB, 1,750 mg, Intravenous, Q24H, Ari Garrett MD    [facility] Hold] cefepime (MAXIPIME) 2 g IVPB minibag, 2 g, Intravenous, Q12H, Ari Garrett MD, Stopped at 09/02/20 0636    [MAR Hold] glucose (GLUTOSE) 40 % oral gel 15 g, 15 g, Oral, PRN, Ari Garrett MD    [facility] Hold] dextrose 50 % IV solution, 12.5 g, Intravenous, PRN, Ari Garrett MD    [MAR Hold] glucagon (rDNA) injection 1 mg, 1 mg, Intramuscular, PRN, Ari Garrett MD    [MAR Hold] dextrose 5 % solution, 100 mL/hr, Intravenous, PRN, Ari Garrett MD    [facility] Hold] heparin (porcine) injection 5,000 Units, 5,000 Units, Subcutaneous, 3 times per day, Ari Garrett MD, 5,000 Units at 09/02/20 0546    [MAR Hold] insulin lispro (HUMALOG) injection vial 0-18 Units, 0-18 Units, Subcutaneous, TID WC, Ari Garrett MD, 3 Units at 09/02/20 0842    [MAR Hold] insulin lispro (HUMALOG) injection vial 0-9 Units, 0-9 Units, Subcutaneous, Nightly, Ari Garrett MD    [facility] Hold] lidocaine 1 % injection 5 mL, 5 mL, Intradermal, Once, Ari Garrett MD    [facility] Hold] sodium chloride flush 0.9 % injection 10 mL, 10 mL, Intravenous, 2 times per day, Ari Garrett MD    [facility] Hold] sodium chloride flush 0.9 % injection 10 mL, 10 mL, Intravenous, PRN, Ari Garrett MD    [facility] Hold] lidocaine 1 % injection 5 mL, 5 mL, Intradermal, Once, Ari Garrett MD    [facility] Hold] sodium chloride flush 0.9 % injection 10 mL, 10 mL, Intravenous, 2 times per day, Ari Garrett MD    [facility] Hold] sodium chloride flush 0.9 % injection 10 mL, 10 mL, Intravenous, PRN, Jose Carlos Bradford MD    [facility] Hold] 0.9 % sodium chloride infusion, , Intravenous, Continuous, Jose Carlos Bradford MD, Last Rate: 150 mL/hr at 09/02/20 1050    [MAR Hold] sodium chloride flush 0.9 % injection 10 mL, 10 mL, Intravenous, 2 times per day, Lois Jacinto MD, 10 mL at 09/02/20 0005    [MAR Hold] sodium chloride flush 0.9 % injection 10 mL, 10 mL, Intravenous, PRN, Lois Jacinto MD    [MAR Hold] promethazine (PHENERGAN) tablet 12.5 mg, 12.5 mg, Oral, Q6H PRN **OR** [MAR Hold] ondansetron (ZOFRAN) injection 4 mg, 4 mg, Intravenous, Q6H PRN, Lois Jacinto MD    [MAR Hold] famotidine (PEPCID) injection 20 mg, 20 mg, Intravenous, BID, Matthew Hyatt MD, 20 mg at 09/02/20 6875    Allergies: Allergies   Allergen Reactions    Ampicillin Swelling     Swelling of throat.  Pcn [Penicillins] Swelling     Throat swelling. Tolerated cefepime during 8/31/20 admission.  Tape Joycie Flax Tape]        Social History:   Social History     Socioeconomic History    Marital status:      Spouse name: Not on file    Number of children: Not on file    Years of education: Not on file    Highest education level: Not on file   Occupational History    Not on file   Social Needs    Financial resource strain: Not on file    Food insecurity     Worry: Not on file     Inability: Not on file    Transportation needs     Medical: Not on file     Non-medical: Not on file   Tobacco Use    Smoking status: Never Smoker    Smokeless tobacco: Never Used   Substance and Sexual Activity    Alcohol use:  Yes     Alcohol/week: 0.0 standard drinks     Comment: once a week    Drug use: No    Sexual activity: Not on file   Lifestyle    Physical activity     Days per week: Not on file     Minutes per session: Not on file    Stress: Not on file   Relationships    Social connections     Talks on phone: Not on file     Gets together: Not on file     Attends [employer] service: Not on file     Active member of club or organization: Not on file     Attends meetings of clubs or organizations: Not on file     Relationship status: Not on file    Intimate partner violence     Fear of current or ex partner: Not on file     Emotionally abused: Not on file     Physically abused: Not on file     Forced sexual activity: Not on file   Other Topics Concern    Not on file   Social History Narrative    Not on file       Family History:    Family History   Problem Relation Age of Onset    Stroke Maternal Grandmother     Stroke Maternal Grandfather        REVIEW OF SYSTEMS:  A comprehensive 14 point review of systems was obtained. Constitutional: + fatigue    Eyes: No blurry vision  Ears, nose, mouth, throat, face: No ringing in the ears; no facial droop. Respiratory: No cough or cold. Cardiovascular: No palpitations  Gastrointestinal: + nausea and emesis  Genitourinary: No burning with urination, currently with hematuria  Integument/Skin: No rashes  Hematologic/Lymphatic: No easy bruising  Musculoskeletal: No muscle pains  Neurologic: No weakness in the extremities. Psychiatric: No depression or suicidal thoughts. Endocrine: No heat or cold intolerances. Allergic/Immunologic: No current seasonal allergies; no skin hives. Physical Exam:    This a 56 y.o. male   Vitals:    09/02/20 1120   BP: (!) 119/100   Pulse: 108   Resp: 13   Temp: 98.4 °F (36.9 °C)   SpO2: 96%     Constitutional: Patient in no acute distress. Neuro: alert and oriented to person place and time. Head: Atraumatic and normocephalic. Neck: Trachea midline   Ext: 2+ radial pulses bilaterally. Psych: Mood and affect normal.  Skin: No rashes or bruising present. Lungs: Respiratory effort normal.  Cardiovascular:  Regular rhythm. Abdomen: Soft, non-tender, minimally distended. Left side has CVA tenderness on exam.  Bladder non-tender and not distended. Lymphatics: no palpable lymphadenopathy.   Penis normal and circumcised  Urethral meatus normal  Scrotal exam normal  Testicles normal bilaterally  Epididymis normal bilaterally  No evidence of inguinal hernia   Bryant with light pink urine    Labs:  Recent Labs     08/31/20 2008 09/01/20  0830 09/02/20  0447   WBC 16.6* 22.0* 16.8*   HGB 10.9* 11.3* 10.4*   HCT 35.2* 35.7* 33.6*   MCV 86.1 86.0 87.3   * 514* 440     Recent Labs     09/01/20  1223 09/01/20  1659 09/01/20 2039 09/02/20  0447    140 140 140   K 4.4 3.9 4.0 4.2   CL 94* 95* 96* 100   CO2 30 31 27 24   PHOS 4.1 4.2 3.6  --    BUN 36* 34* 34* 33*   CREATININE 1.83* 1.66* 1.72* 1.58*       Recent Labs     09/02/20  0207   COLORU RED*   PHUR 8.5*   WBCUA TOO NUMEROUS TO COUNT   RBCUA TOO NUMEROUS TO COUNT   MUCUS NOT REPORTED   TRICHOMONAS NOT REPORTED   YEAST NOT REPORTED   BACTERIA NOT REPORTED   SPECGRAV 1.019   LEUKOCYTESUR LARGE*   UROBILINOGEN Normal   BILIRUBINUR NEGATIVE         No results found for: PSA       Culture Results:  UCx and blood culture x2 from 9/2/20 pending  UCx 8/10/20 neg growth      -----------------------------------------------------------------  Imaging Results:  Ct Abdomen Pelvis Wo Contrast    Result Date: 9/1/2020  EXAMINATION: CT OF THE ABDOMEN AND PELVIS WITHOUT CONTRAST 8/31/2020 3:20 pm TECHNIQUE: CT of the abdomen and pelvis was performed without the administration of intravenous contrast. Multiplanar reformatted images are provided for review. Dose modulation, iterative reconstruction, and/or weight based adjustment of the mA/kV was utilized to reduce the radiation dose to as low as reasonably achievable. COMPARISON: 8/21/2020 HISTORY: ORDERING SYSTEM PROVIDED HISTORY: Abdominal Pain TECHNOLOGIST PROVIDED HISTORY: Abdominal Pain Reason for Exam: Pt states vomiting since last night. Pt states known left renal mass to be removed in 2 days. Acuity: Acute Type of Exam: Initial FINDINGS: Lower Chest: No acute infiltrate at the lung bases. Organs:  The unenhanced liver, spleen, pancreas and adrenal glands are unremarkable. No acute biliary findings. The right kidney is unremarkable. There is an infiltrative mass in the lower pole of the left kidney, previously partially visualized on CT of the right hip. This measures approximately 4.8 x 6.9 x 9.9 cm. The mass infiltrates the mesentery and results in proximal small bowel obstruction. There is a small middle pole left renal cyst.  Nonobstructive lower pole calculus measuring 6 x 12 mm. GI/Bowel: Marked gastric and duodenal distention. There is proximal small-bowel obstruction due to the infiltrative lower pole left renal mass. The distal small bowel is nondistended. No pericolonic inflammatory changes. Pelvis: Trace free pelvic fluid. No pelvic mass is identified. The prostate is not enlarged. Partial distention of the urinary bladder. Peritoneum/Retroperitoneum: The abdominal aorta is normal in caliber. 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 No pathologic retroperitoneal adenopathy. Bones/Soft Tissues: No acute osseous or soft tissue abnormality. No lytic or blastic osseous lesions. Multilevel degenerative changes in the lower thoracic and lumbar spine. 1. Proximal small-bowel obstruction due to extension of an infiltrative lower pole left renal mass to the mesentery and adjacent small bowel. There is marked gastric distension. 2. Infiltrative lower pole left renal mass, presumed malignant measuring 4.8 x 6.9 x 9.9 cm. Nonobstructive left nephrolithiasis. 3. Trace free pelvic fluid. Findings were discussed with Trip Posada at 3:37 pm on 8/31/2020. Xr Acute Abd Series Chest 1 Vw    Result Date: 8/31/2020  EXAMINATION: TWO XRAY VIEWS OF THE ABDOMEN AND SINGLE  XRAY VIEW OF THE CHEST 8/31/2020 2:25 pm COMPARISON: None.  HISTORY: ORDERING SYSTEM PROVIDED HISTORY: Pain TECHNOLOGIST PROVIDED HISTORY: Pain Reason for Exam: pt states vomiting and abdomen pain x 2 days Acuity: Acute Type of Exam: Initial FINDINGS: The cardiomediastinal silhouette is unremarkable. The lungs are clear. No infiltrate, pleural fluid or focal process is seen. Moderate aortic tortuosity, particularly involving the ascending aorta. 5 x 11 mm calcification on the left at the L3 vertebral level, possibly within the left renal pelvis or proximal left ureter. No other plain film evidence of renal or ureteral calculi. Bowel gas pattern is nonspecific. No focally distended loops, air-fluid levels or free air. Mild gastric distension. Multilevel osteoarthritic spurring in the lumbar spine. No acute cardiopulmonary disease. 5 x 11 mm calcification, possibly a renal or proximal ureteral calculus. Nonspecific bowel-gas pattern. Xr Abdomen For Ng/og/ne Tube Placement    Result Date: 9/1/2020  EXAMINATION: ONE SUPINE XRAY VIEW(S) OF THE ABDOMEN 9/1/2020 6:26 am COMPARISON: 08/31/2020 HISTORY: ORDERING SYSTEM PROVIDED HISTORY: Confirmation of course of NG/OG/NE tube and location of tip of tube TECHNOLOGIST PROVIDED HISTORY: Confirmation of course of NG/OG/NE tube and location of tip of tube Portable? ->Yes Reason for Exam: ng placement Acuity: Unknown Type of Exam: Unknown FINDINGS: The nasogastric tube proximal side port is in the proximal stomach. The distal tip is in the mid gastric body. There is no evidence of bowel obstruction. There is a stable left renal calculus. No acute osseous abnormality is seen. The distal end of the nasogastric tube is in the mid gastric body. CT abd/pel 8/31/20   - dilated small bowel  - 6.6 x 9.9 cm left lower pole renal mass, radiologist raises concern for infiltration of jejunal mesentery  - right distal ureteral stone      The images were reviewed personally by me as well as the radiology report.      Assessment and Plan   Impression:    Jackelyn Barnes II is a 56 y.o. male with   Problem(s):  - 9.9 cm left lower pole renal mass  - small bowel obstruction likely secondary to mesenteric invasion of tumor  - acute kidney injury on CKD stage IIIA (baseline Cr 1.4)  - right distal ureteral stone s/p cystoscopy and right ureteral stent placement by Dr Vivienne Pardo  - platelet dysfunction on aspirin 81 mg last dose 8/28/20  - comorbid conditions: diabetes mellitus type 2, HTN, obesity and chronic kidney disease. Plan:   - COVID 19 test neg 9/1.  -NPO and NG tube in place, appreciate general surgery recommendations  -Follow up  UCx and blood culture x2 from 9/2/20 pending,   blood culture x2 from 8/31/20 neg growth at 22 hr  -Antibiotics Empiric Cefepime and Vancomycin  -Appreciate ICU/medicine management and clearance for general anesthesia. Plan for Left partial vs radical nephrectomy tentatively on 9/4/20, possibly more urgently if he becomes unstable or peritonitic. Will consider robotic approproach if ammenable otherwise will need open surgery   - maintain Bryant  - Liver panel 8/31 with normal albumin 3.6 and normal alk phos 104.       MehrdadModustri Solders  1:11 PM 9/2/2020

## 2020-09-02 NOTE — PROGRESS NOTES
Pt left with [facility] at [address]. Wife has pt's belongings and will be following transport over to [facility]. Keyanna Writer called and gave report to Crystal Isbell RN in ICU.

## 2020-09-03 ENCOUNTER — APPOINTMENT (OUTPATIENT)
Dept: GENERAL RADIOLOGY | Age: 60
DRG: 853 | End: 2020-09-03
Attending: INTERNAL MEDICINE
Payer: COMMERCIAL

## 2020-09-03 LAB
ABSOLUTE EOS #: <0.03 K/UL (ref 0–0.44)
ABSOLUTE IMMATURE GRANULOCYTE: 0.14 K/UL (ref 0–0.3)
ABSOLUTE LYMPH #: 1.75 K/UL (ref 1.1–3.7)
ABSOLUTE MONO #: 1.4 K/UL (ref 0.1–1.2)
ALBUMIN SERPL-MCNC: 2.3 G/DL (ref 3.5–5.2)
ALBUMIN/GLOBULIN RATIO: 0.6 (ref 1–2.5)
ALLEN TEST: NORMAL
ALP BLD-CCNC: 58 U/L (ref 40–129)
ALT SERPL-CCNC: 21 U/L (ref 5–41)
ANION GAP SERPL CALCULATED.3IONS-SCNC: 10 MMOL/L (ref 9–17)
ANION GAP SERPL CALCULATED.3IONS-SCNC: 11 MMOL/L (ref 9–17)
AST SERPL-CCNC: 20 U/L
BASOPHILS # BLD: 0 % (ref 0–2)
BASOPHILS ABSOLUTE: 0.06 K/UL (ref 0–0.2)
BILIRUB SERPL-MCNC: 0.89 MG/DL (ref 0.3–1.2)
BUN BLDV-MCNC: 30 MG/DL (ref 8–23)
BUN BLDV-MCNC: 32 MG/DL (ref 8–23)
BUN/CREAT BLD: ABNORMAL (ref 9–20)
BUN/CREAT BLD: ABNORMAL (ref 9–20)
CALCIUM SERPL-MCNC: 7.8 MG/DL (ref 8.6–10.4)
CALCIUM SERPL-MCNC: 8 MG/DL (ref 8.6–10.4)
CHLORIDE BLD-SCNC: 104 MMOL/L (ref 98–107)
CHLORIDE BLD-SCNC: 107 MMOL/L (ref 98–107)
CO2: 22 MMOL/L (ref 20–31)
CO2: 23 MMOL/L (ref 20–31)
CREAT SERPL-MCNC: 1.83 MG/DL (ref 0.7–1.2)
CREAT SERPL-MCNC: 1.84 MG/DL (ref 0.7–1.2)
DIFFERENTIAL TYPE: ABNORMAL
EOSINOPHILS RELATIVE PERCENT: 0 % (ref 1–4)
FIO2: 30
GFR AFRICAN AMERICAN: 45 ML/MIN
GFR AFRICAN AMERICAN: 46 ML/MIN
GFR NON-AFRICAN AMERICAN: 37 ML/MIN
GFR NON-AFRICAN AMERICAN: 38 ML/MIN
GFR SERPL CREATININE-BSD FRML MDRD: ABNORMAL ML/MIN/{1.73_M2}
GLUCOSE BLD-MCNC: 121 MG/DL (ref 75–110)
GLUCOSE BLD-MCNC: 124 MG/DL (ref 75–110)
GLUCOSE BLD-MCNC: 131 MG/DL (ref 75–110)
GLUCOSE BLD-MCNC: 137 MG/DL (ref 75–110)
GLUCOSE BLD-MCNC: 148 MG/DL (ref 75–110)
GLUCOSE BLD-MCNC: 168 MG/DL (ref 75–110)
GLUCOSE BLD-MCNC: 187 MG/DL (ref 75–110)
GLUCOSE BLD-MCNC: 217 MG/DL (ref 70–99)
GLUCOSE BLD-MCNC: 220 MG/DL (ref 75–110)
GLUCOSE BLD-MCNC: 251 MG/DL (ref 75–110)
GLUCOSE BLD-MCNC: 265 MG/DL (ref 75–110)
GLUCOSE BLD-MCNC: 313 MG/DL (ref 70–99)
GLUCOSE BLD-MCNC: 345 MG/DL (ref 74–100)
HCT VFR BLD CALC: 26.7 % (ref 40.7–50.3)
HCT VFR BLD CALC: 28.1 % (ref 40.7–50.3)
HEMOGLOBIN: 8.1 G/DL (ref 13–17)
HEMOGLOBIN: 8.5 G/DL (ref 13–17)
IMMATURE GRANULOCYTES: 1 %
LYMPHOCYTES # BLD: 10 % (ref 24–43)
MCH RBC QN AUTO: 27.2 PG (ref 25.2–33.5)
MCH RBC QN AUTO: 27.4 PG (ref 25.2–33.5)
MCHC RBC AUTO-ENTMCNC: 30.2 G/DL (ref 28.4–34.8)
MCHC RBC AUTO-ENTMCNC: 30.3 G/DL (ref 28.4–34.8)
MCV RBC AUTO: 90.1 FL (ref 82.6–102.9)
MCV RBC AUTO: 90.2 FL (ref 82.6–102.9)
MODE: NORMAL
MONOCYTES # BLD: 8 % (ref 3–12)
NEGATIVE BASE EXCESS, ART: NORMAL (ref 0–2)
NRBC AUTOMATED: 0 PER 100 WBC
NRBC AUTOMATED: 0 PER 100 WBC
O2 DEVICE/FLOW/%: NORMAL
PATIENT TEMP: NORMAL
PDW BLD-RTO: 13 % (ref 11.8–14.4)
PDW BLD-RTO: 13 % (ref 11.8–14.4)
PLATELET # BLD: 329 K/UL (ref 138–453)
PLATELET # BLD: 376 K/UL (ref 138–453)
PLATELET ESTIMATE: ABNORMAL
PMV BLD AUTO: 10.1 FL (ref 8.1–13.5)
PMV BLD AUTO: 10.4 FL (ref 8.1–13.5)
POC HCO3: 24.4 MMOL/L (ref 21–28)
POC O2 SATURATION: 98 % (ref 94–98)
POC PCO2 TEMP: NORMAL MM HG
POC PCO2: 37.1 MM HG (ref 35–48)
POC PH TEMP: NORMAL
POC PH: 7.43 (ref 7.35–7.45)
POC PO2 TEMP: NORMAL MM HG
POC PO2: 103.4 MM HG (ref 83–108)
POSITIVE BASE EXCESS, ART: 0 (ref 0–3)
POTASSIUM SERPL-SCNC: 4.1 MMOL/L (ref 3.7–5.3)
POTASSIUM SERPL-SCNC: 4.5 MMOL/L (ref 3.7–5.3)
RBC # BLD: 2.96 M/UL (ref 4.21–5.77)
RBC # BLD: 3.12 M/UL (ref 4.21–5.77)
RBC # BLD: ABNORMAL 10*6/UL
SAMPLE SITE: NORMAL
SEG NEUTROPHILS: 81 % (ref 36–65)
SEGMENTED NEUTROPHILS ABSOLUTE COUNT: 14.56 K/UL (ref 1.5–8.1)
SODIUM BLD-SCNC: 138 MMOL/L (ref 135–144)
SODIUM BLD-SCNC: 139 MMOL/L (ref 135–144)
TCO2 (CALC), ART: 26 MMOL/L (ref 22–29)
TOTAL PROTEIN: 6 G/DL (ref 6.4–8.3)
WBC # BLD: 15.7 K/UL (ref 3.5–11.3)
WBC # BLD: 17.9 K/UL (ref 3.5–11.3)
WBC # BLD: ABNORMAL 10*3/UL

## 2020-09-03 PROCEDURE — 71045 X-RAY EXAM CHEST 1 VIEW: CPT

## 2020-09-03 PROCEDURE — 6360000002 HC RX W HCPCS: Performed by: SURGERY

## 2020-09-03 PROCEDURE — 6360000002 HC RX W HCPCS: Performed by: STUDENT IN AN ORGANIZED HEALTH CARE EDUCATION/TRAINING PROGRAM

## 2020-09-03 PROCEDURE — 6370000000 HC RX 637 (ALT 250 FOR IP): Performed by: STUDENT IN AN ORGANIZED HEALTH CARE EDUCATION/TRAINING PROGRAM

## 2020-09-03 PROCEDURE — 2000000000 HC ICU R&B

## 2020-09-03 PROCEDURE — 85025 COMPLETE CBC W/AUTO DIFF WBC: CPT

## 2020-09-03 PROCEDURE — 82803 BLOOD GASES ANY COMBINATION: CPT

## 2020-09-03 PROCEDURE — 2580000003 HC RX 258: Performed by: STUDENT IN AN ORGANIZED HEALTH CARE EDUCATION/TRAINING PROGRAM

## 2020-09-03 PROCEDURE — 2500000003 HC RX 250 WO HCPCS: Performed by: STUDENT IN AN ORGANIZED HEALTH CARE EDUCATION/TRAINING PROGRAM

## 2020-09-03 PROCEDURE — 94761 N-INVAS EAR/PLS OXIMETRY MLT: CPT

## 2020-09-03 PROCEDURE — 2580000003 HC RX 258: Performed by: SURGERY

## 2020-09-03 PROCEDURE — 99291 CRITICAL CARE FIRST HOUR: CPT | Performed by: INTERNAL MEDICINE

## 2020-09-03 PROCEDURE — 82947 ASSAY GLUCOSE BLOOD QUANT: CPT

## 2020-09-03 PROCEDURE — 6370000000 HC RX 637 (ALT 250 FOR IP): Performed by: SURGERY

## 2020-09-03 PROCEDURE — 36569 INSJ PICC 5 YR+ W/O IMAGING: CPT

## 2020-09-03 PROCEDURE — 76937 US GUIDE VASCULAR ACCESS: CPT

## 2020-09-03 PROCEDURE — 94770 HC ETCO2 MONITOR DAILY: CPT

## 2020-09-03 PROCEDURE — C1751 CATH, INF, PER/CENT/MIDLINE: HCPCS

## 2020-09-03 PROCEDURE — 02HV33Z INSERTION OF INFUSION DEVICE INTO SUPERIOR VENA CAVA, PERCUTANEOUS APPROACH: ICD-10-PCS | Performed by: STUDENT IN AN ORGANIZED HEALTH CARE EDUCATION/TRAINING PROGRAM

## 2020-09-03 PROCEDURE — 80053 COMPREHEN METABOLIC PANEL: CPT

## 2020-09-03 PROCEDURE — 94003 VENT MGMT INPAT SUBQ DAY: CPT

## 2020-09-03 PROCEDURE — 37799 UNLISTED PX VASCULAR SURGERY: CPT

## 2020-09-03 PROCEDURE — 85027 COMPLETE CBC AUTOMATED: CPT

## 2020-09-03 PROCEDURE — 2700000000 HC OXYGEN THERAPY PER DAY

## 2020-09-03 PROCEDURE — 80048 BASIC METABOLIC PNL TOTAL CA: CPT

## 2020-09-03 RX ORDER — METOPROLOL TARTRATE 5 MG/5ML
5 INJECTION INTRAVENOUS EVERY 6 HOURS
Status: DISCONTINUED | OUTPATIENT
Start: 2020-09-03 | End: 2020-09-04

## 2020-09-03 RX ORDER — METOPROLOL TARTRATE 5 MG/5ML
5 INJECTION INTRAVENOUS EVERY 6 HOURS PRN
Status: DISCONTINUED | OUTPATIENT
Start: 2020-09-03 | End: 2020-09-11 | Stop reason: HOSPADM

## 2020-09-03 RX ORDER — METOCLOPRAMIDE HYDROCHLORIDE 5 MG/ML
10 INJECTION INTRAMUSCULAR; INTRAVENOUS EVERY 6 HOURS
Status: COMPLETED | OUTPATIENT
Start: 2020-09-03 | End: 2020-09-06

## 2020-09-03 RX ORDER — BISACODYL 10 MG
10 SUPPOSITORY, RECTAL RECTAL DAILY
Status: COMPLETED | OUTPATIENT
Start: 2020-09-03 | End: 2020-09-06

## 2020-09-03 RX ORDER — METOPROLOL TARTRATE 5 MG/5ML
5 INJECTION INTRAVENOUS EVERY 6 HOURS
Status: DISCONTINUED | OUTPATIENT
Start: 2020-09-03 | End: 2020-09-03

## 2020-09-03 RX ADMIN — FENTANYL CITRATE 100 MCG: 50 INJECTION, SOLUTION INTRAMUSCULAR; INTRAVENOUS at 02:30

## 2020-09-03 RX ADMIN — MORPHINE SULFATE 4 MG: 4 INJECTION, SOLUTION INTRAMUSCULAR; INTRAVENOUS at 23:28

## 2020-09-03 RX ADMIN — METRONIDAZOLE 500 MG: 500 INJECTION, SOLUTION INTRAVENOUS at 18:05

## 2020-09-03 RX ADMIN — HEPARIN SODIUM 5000 UNITS: 5000 INJECTION INTRAVENOUS; SUBCUTANEOUS at 21:49

## 2020-09-03 RX ADMIN — METOPROLOL TARTRATE 5 MG: 1 INJECTION, SOLUTION INTRAVENOUS at 13:30

## 2020-09-03 RX ADMIN — METRONIDAZOLE 500 MG: 500 INJECTION, SOLUTION INTRAVENOUS at 10:08

## 2020-09-03 RX ADMIN — METOPROLOL TARTRATE 5 MG: 1 INJECTION, SOLUTION INTRAVENOUS at 18:05

## 2020-09-03 RX ADMIN — METOPROLOL TARTRATE 5 MG: 1 INJECTION, SOLUTION INTRAVENOUS at 21:54

## 2020-09-03 RX ADMIN — FENTANYL CITRATE 100 MCG: 50 INJECTION, SOLUTION INTRAMUSCULAR; INTRAVENOUS at 04:38

## 2020-09-03 RX ADMIN — FENTANYL CITRATE 100 MCG: 50 INJECTION, SOLUTION INTRAMUSCULAR; INTRAVENOUS at 06:49

## 2020-09-03 RX ADMIN — FAMOTIDINE 20 MG: 10 INJECTION INTRAVENOUS at 08:35

## 2020-09-03 RX ADMIN — METRONIDAZOLE 500 MG: 500 INJECTION, SOLUTION INTRAVENOUS at 02:21

## 2020-09-03 RX ADMIN — PROPOFOL 40 MCG/KG/MIN: 10 INJECTION, EMULSION INTRAVENOUS at 01:24

## 2020-09-03 RX ADMIN — METOCLOPRAMIDE 10 MG: 5 INJECTION, SOLUTION INTRAMUSCULAR; INTRAVENOUS at 13:30

## 2020-09-03 RX ADMIN — BISACODYL 10 MG: 10 SUPPOSITORY RECTAL at 13:35

## 2020-09-03 RX ADMIN — SODIUM CHLORIDE 200 MG: 9 INJECTION, SOLUTION INTRAVENOUS at 07:33

## 2020-09-03 RX ADMIN — Medication 10 ML: at 20:32

## 2020-09-03 RX ADMIN — SODIUM CHLORIDE: 9 INJECTION, SOLUTION INTRAVENOUS at 03:05

## 2020-09-03 RX ADMIN — METOCLOPRAMIDE 10 MG: 5 INJECTION, SOLUTION INTRAMUSCULAR; INTRAVENOUS at 08:35

## 2020-09-03 RX ADMIN — SODIUM CHLORIDE: 9 INJECTION, SOLUTION INTRAVENOUS at 20:36

## 2020-09-03 RX ADMIN — CEFEPIME HYDROCHLORIDE 1 G: 1 INJECTION, POWDER, FOR SOLUTION INTRAMUSCULAR; INTRAVENOUS at 15:46

## 2020-09-03 RX ADMIN — HEPARIN SODIUM 5000 UNITS: 5000 INJECTION INTRAVENOUS; SUBCUTANEOUS at 08:36

## 2020-09-03 RX ADMIN — CEFEPIME 2 G: 2 INJECTION, POWDER, FOR SOLUTION INTRAVENOUS at 03:27

## 2020-09-03 RX ADMIN — FENTANYL CITRATE 100 MCG: 50 INJECTION, SOLUTION INTRAMUSCULAR; INTRAVENOUS at 00:22

## 2020-09-03 RX ADMIN — FENTANYL CITRATE 100 MCG: 50 INJECTION, SOLUTION INTRAMUSCULAR; INTRAVENOUS at 12:38

## 2020-09-03 RX ADMIN — METOPROLOL TARTRATE 2.5 MG: 1 INJECTION, SOLUTION INTRAVENOUS at 05:33

## 2020-09-03 RX ADMIN — FENTANYL CITRATE 100 MCG: 50 INJECTION, SOLUTION INTRAMUSCULAR; INTRAVENOUS at 10:08

## 2020-09-03 RX ADMIN — FENTANYL CITRATE 100 MCG: 50 INJECTION, SOLUTION INTRAMUSCULAR; INTRAVENOUS at 20:28

## 2020-09-03 RX ADMIN — FENTANYL CITRATE 100 MCG: 50 INJECTION, SOLUTION INTRAMUSCULAR; INTRAVENOUS at 13:30

## 2020-09-03 RX ADMIN — SODIUM CHLORIDE 5.73 UNITS/HR: 9 INJECTION, SOLUTION INTRAVENOUS at 10:59

## 2020-09-03 RX ADMIN — FENTANYL CITRATE 100 MCG: 50 INJECTION, SOLUTION INTRAMUSCULAR; INTRAVENOUS at 08:01

## 2020-09-03 RX ADMIN — FENTANYL CITRATE 100 MCG: 50 INJECTION, SOLUTION INTRAMUSCULAR; INTRAVENOUS at 14:38

## 2020-09-03 RX ADMIN — MORPHINE SULFATE 4 MG: 4 INJECTION, SOLUTION INTRAMUSCULAR; INTRAVENOUS at 17:58

## 2020-09-03 RX ADMIN — PROPOFOL 40 MCG/KG/MIN: 10 INJECTION, EMULSION INTRAVENOUS at 04:17

## 2020-09-03 RX ADMIN — FENTANYL CITRATE 100 MCG: 50 INJECTION, SOLUTION INTRAMUSCULAR; INTRAVENOUS at 15:38

## 2020-09-03 RX ADMIN — METOCLOPRAMIDE 10 MG: 5 INJECTION, SOLUTION INTRAMUSCULAR; INTRAVENOUS at 18:05

## 2020-09-03 RX ADMIN — FENTANYL CITRATE 100 MCG: 50 INJECTION, SOLUTION INTRAMUSCULAR; INTRAVENOUS at 11:33

## 2020-09-03 RX ADMIN — FENTANYL CITRATE 100 MCG: 50 INJECTION, SOLUTION INTRAMUSCULAR; INTRAVENOUS at 09:03

## 2020-09-03 RX ADMIN — FENTANYL CITRATE 100 MCG: 50 INJECTION, SOLUTION INTRAMUSCULAR; INTRAVENOUS at 16:55

## 2020-09-03 RX ADMIN — SODIUM CHLORIDE, PRESERVATIVE FREE 10 ML: 5 INJECTION INTRAVENOUS at 20:32

## 2020-09-03 RX ADMIN — FENTANYL CITRATE 100 MCG: 50 INJECTION, SOLUTION INTRAMUSCULAR; INTRAVENOUS at 18:05

## 2020-09-03 RX ADMIN — FENTANYL CITRATE 100 MCG: 50 INJECTION, SOLUTION INTRAMUSCULAR; INTRAVENOUS at 01:35

## 2020-09-03 RX ADMIN — FENTANYL CITRATE 100 MCG: 50 INJECTION, SOLUTION INTRAMUSCULAR; INTRAVENOUS at 23:48

## 2020-09-03 RX ADMIN — FENTANYL CITRATE 100 MCG: 50 INJECTION, SOLUTION INTRAMUSCULAR; INTRAVENOUS at 21:47

## 2020-09-03 RX ADMIN — INSULIN LISPRO 9 UNITS: 100 INJECTION, SOLUTION INTRAVENOUS; SUBCUTANEOUS at 08:30

## 2020-09-03 RX ADMIN — FENTANYL CITRATE 100 MCG: 50 INJECTION, SOLUTION INTRAMUSCULAR; INTRAVENOUS at 03:30

## 2020-09-03 RX ADMIN — FENTANYL CITRATE 100 MCG: 50 INJECTION, SOLUTION INTRAMUSCULAR; INTRAVENOUS at 19:16

## 2020-09-03 ASSESSMENT — PAIN SCALES - GENERAL
PAINLEVEL_OUTOF10: 8
PAINLEVEL_OUTOF10: 7
PAINLEVEL_OUTOF10: 10
PAINLEVEL_OUTOF10: 9
PAINLEVEL_OUTOF10: 9
PAINLEVEL_OUTOF10: 8
PAINLEVEL_OUTOF10: 8
PAINLEVEL_OUTOF10: 9
PAINLEVEL_OUTOF10: 10
PAINLEVEL_OUTOF10: 9
PAINLEVEL_OUTOF10: 7
PAINLEVEL_OUTOF10: 10
PAINLEVEL_OUTOF10: 8
PAINLEVEL_OUTOF10: 9
PAINLEVEL_OUTOF10: 4
PAINLEVEL_OUTOF10: 5

## 2020-09-03 ASSESSMENT — PULMONARY FUNCTION TESTS
PIF_VALUE: 20
PIF_VALUE: 20
PIF_VALUE: 18
PIF_VALUE: 15
PIF_VALUE: 21
PIF_VALUE: 13

## 2020-09-03 ASSESSMENT — PAIN DESCRIPTION - LOCATION
LOCATION: ABDOMEN

## 2020-09-03 ASSESSMENT — PAIN DESCRIPTION - PAIN TYPE
TYPE: ACUTE PAIN;SURGICAL PAIN

## 2020-09-03 NOTE — CARE COORDINATION
Case Management Initial Discharge Plan  Poonam Denton II,             Met with:patient and SO to discuss discharge plans. Information verified: address, contacts, phone number, , insurance Yes    Emergency Contact/Next of Kin name & number: Madi Zamarripa ([facility]) [phone]    PCP: Katerine Castañeda MD  Date of last visit: past year    Insurance Provider: [employer]    Discharge Planning    Living Arrangements:      Support Systems:       Home has 1 stories  2 stairs to climb to get into front door,   Location of bedroom/bathroom in home main    Patient able to perform ADL's:Independent    Current Services (outpatient & in home)   DME equipment: CPAP  DME provider: [facility]    Receiving oral anticoagulation therapy? No    If indicated:   Physician managing anticoagulation treatment: n/a   Where does patient obtain lab work for ATC treatment? N/a       Potential Assistance Needed:       Patient agreeable to home care: Yes  Freedom of choice provided:  yes  Agreeable to [facility] if needed. List provided to wife. Prior SNF/Rehab Placement and Facility: no  Agreeable to SNF/Rehab: Yes  [place] of choice provided: yes  Agreeable to rehab not to SNF   Evaluation: n/a    Expected Discharge date:       Patient expects to be discharged to: Follow Up Appointment: Best Day/ Time:      Transportation provider: Wife   Transportation arrangements needed for discharge: Yes    Readmission Risk              Risk of Unplanned Readmission:        25             Does patient have a readmission risk score greater than 14?: Yes  If yes, follow-up appointment must be made within 7 days of discharge. Goals of Care:       [facility]. Lives with wife. Follow for needs.            Electronically signed by Husam Davison RN on 9/3/20 at 6:25 PM EDT

## 2020-09-03 NOTE — PROGRESS NOTES
Comprehensive Nutrition Assessment    Type and Reason for Visit:  Reassess    Nutrition Recommendations/Plan: Start nutrition as able. If TPN needed, suggest starting with 200 g Dex, 50 g AA at 41.7 mL/hr with 100 mL 20% lipids. Will continue to follow/monitor plans. Nutrition Assessment:  S/p right nephrectomy and SBR with primary anastomosis, left partial colectomy with primary anastomosis, open gastrectomy tube 9/3/20. Pt extubated today. Plan to hold off on intiation of oral nutrition/nutrition support at this time per CC resident/note. Labs reviewed: hyperglycemia noted. Meds reviewed/include: insulin drip. Malnutrition Assessment:  Malnutrition Status:  Insufficient data      Estimated Daily Nutrient Needs:  Energy (kcal):  7614-2539 kcal/day; Weight Used for Energy Requirements:  Ideal     Protein (g):  95 g pro/day; Weight Used for Protein Requirements:  Ideal(1.2)          Nutrition Related Findings:  PICC placed today      Wounds:  Surgical Wound       Current Nutrition Therapies:    Diet NPO Effective Now    Anthropometric Measures:  · Height: 5' 11\" (180.3 cm)  · Current Body Weight: 257 lb 0.9 oz (116.6 kg)   · Usual Body Weight: 294 lb (133.4 kg)(3/16/20 per EHR)     · Ideal Body Weight: 172 lbs; % Ideal Body Weight  149%   · BMI: 35.9  · BMI Categories: Obese Class 2 (BMI 35.0 -39.9)       Nutrition Diagnosis:   · Inadequate oral intake related to altered GI function as evidenced by NPO or clear liquid status due to medical condition    Nutrition Interventions:   Food and/or Nutrient Delivery:  Start nutrition as able. If TPN needed, suggest starting with 200 g Dex, 50 g AA at 41.7 mL/hr with 100 mL 20% lipids.   Nutrition Education/Counseling:  Education not indicated   Coordination of Nutrition Care:  Continued Inpatient Monitoring    Goals:  meet % of estimated nutrition needs       Nutrition Monitoring and Evaluation:   Food/Nutrient Intake Outcomes:  Diet Advancement/Tolerance  Physical Signs/Symptoms Outcomes:  Biochemical Data, GI Status, Nutrition Focused Physical Findings, Skin, Weight     Electronically signed by Christiano Barrera RD, LD on 9/3/20 at 2:25 PM EDT    Contact: [phone]

## 2020-09-03 NOTE — PROGRESS NOTES
Critical Care Team - Daily Progress Note      Date and time: 9/3/2020 11:28 AM  Patient's name:  Kevin Curtis II  Medical Record Number: [id number]  Patient's account/billing number: [de-identified]  Patient's YOB: 1960  Age: 56 y.o. Date of Admission: 9/1/2020 11:04 PM  Length of stay during current admission: 2      Primary Care Physician: Jerad Castro MD  ICU Attending Physician: Dr. Parvin Amador Status: Full Code    Reason for ICU admission: Small bowel obstruction secondary to infiltrating left renal mass      SUBJECTIVE:      Overnight no significant events. Yesterday patient underwent laparoscopic robotic nephrectomy and   Exploratory laparotomy bowel resection of proximal jejunum and descending colon with mobilization of splenic flexure and primary anastomosis of small bowel and colon along with placement of gastrostomy tube. Postoperatively patient was stable overnight. Current evaluation:   Patient continues to be intubated, sedated on 40 of propofol. Continued on mechanical ventilation PRVC mode FiO2 30%. Will wean this morning. ABG unremarkable for acid-base disorder. Moving extremities, following commands. VS stable except tachycardia with -130. Sinus tachycardia    Labs- Hb-8.5,   WBC-17.9 creatinine increased to 1.83, blood glucose-345  U/O- 1.6 L/24hrs, NG output 145(as charted). Perioperative blood loss 250ml    NG to LIWS  G-tube in place. N.p.o. for now. Cast in place.     FOLLOWING COMMANDS:  [] No   [x] Yes  CURRENT VENTILATION STATUS:   [x] Ventilator  [] BIPAP  [] Nasal Cannula [] Room Air      SECRETIONS Amount:  [] Small [x] Moderate    SEDATION: Fentanyl 175 mcg  PARALYZED:  [x] No    [] Yes  DIARRHEA:                [x] No                                                                                                               CAST'S CATHETER:   [] No   [x] Yes  (Date of Insertion:   )   URINE OUTPUT:            [x] Good   [] Low [] Anuric      OBJECTIVE:     VITAL SIGNS:  /79   Pulse 134   Temp 99.3 °F (37.4 °C) (Oral)   Resp 15   Ht 5' 11\" (1.803 m)   Wt 257 lb 8 oz (116.8 kg)   SpO2 99%   BMI 35.91 kg/m²   Tmax over 24 hours:  Temp (24hrs), Av.1 °F (37.3 °C), Min:97.9 °F (36.6 °C), Max:99.7 °F (37.6 °C)      Patient Vitals for the past 8 hrs:   BP Temp Temp src Pulse Resp SpO2 Weight   20 1114 -- -- -- -- 15 99 % --   20 0817 -- -- -- 134 20 100 % --   20 0804 -- -- -- 127 (!) 7 100 % --   20 0800 121/79 99.3 °F (37.4 °C) Oral 127 (!) 6 100 % --   20 0700 112/75 -- -- 124 16 100 % --   20 0600 107/78 -- -- 118 16 100 % --   20 0500 108/69 -- -- 127 16 100 % --   20 0400 110/70 97.9 °F (36.6 °C) Oral 125 16 100 % 257 lb 8 oz (116.8 kg)         Intake/Output Summary (Last 24 hours) at 9/3/2020 1128  Last data filed at 9/3/2020 0804  Gross per 24 hour   Intake 5705.8 ml   Output 1750 ml   Net 3955.8 ml     Date 20 0000 - 20 2359   Shift 5378-6657 3722-1784 2258-2612 24 Hour Total   INTAKE   I.V.(mL/kg) 1420(12.2) 272.8(2.3)  1692. 8(14.5)   IV Piggyback(mL/kg) 500(4.3)   500(4.3)   Shift Total(mL/kg) 5498(19.1) 272.8(2.3)  2192. 8(18.8)   OUTPUT   Urine(mL/kg/hr) 590(0.6) 400  990   Emesis/NG output(mL/kg) 75(0.6) 15(0.1)  90(0.8)   Shift Total(mL/kg) 665(5.7) 415(3.6)  1080(9.2)   Weight (kg) 116.8 116.8 116.8 116.8     Wt Readings from Last 3 Encounters:   20 257 lb 8 oz (116.8 kg)   20 255 lb (115.7 kg)   08/10/20 263 lb (119.3 kg)     Body mass index is 35.91 kg/m². PHYSICAL EXAM:  Constitutional: Intubated sedated continued on supportive care. Neck: Supple, symmetrical, trachea midline  Respiratory: clear to auscultation, no wheezes or rales  Cardiovascular: Tachycardia, sinus rhythm  Abdomen: soft,  dressing in place covering G-tube site.   NEUROLOGIC-following commands intermittently, moving extremities  Extremities:peripheral pulses normal,1+ pedal edema  SKIN: normal coloration and turgor    Any additional physical findings:      MEDICATIONS:  Scheduled Meds:   bisacodyl  10 mg Rectal Daily    metoclopramide  10 mg Intravenous Q6H    [START ON 9/4/2020] famotidine (PEPCID) injection  20 mg Intravenous Daily    cefepime  1 g Intravenous Q12H    sodium chloride flush  10 mL Intravenous 2 times per day    sodium chloride flush  10 mL Intravenous 2 times per day    heparin (porcine)  5,000 Units Subcutaneous 3 times per day    metroNIDAZOLE  500 mg Intravenous Q8H    sodium chloride flush  10 mL Intravenous 2 times per day     Continuous Infusions:   insulin (HUMAN R) non-weight based infusion 5.73 Units/hr (09/03/20 1059)    dextrose      propofol Stopped (09/03/20 0804)    sodium chloride 150 mL/hr at 09/03/20 0305     PRN Meds:   metoprolol, 5 mg, Q6H PRN  glucose, 15 g, PRN  dextrose, 12.5 g, PRN  glucagon (rDNA), 1 mg, PRN  dextrose, 100 mL/hr, PRN  sodium chloride flush, 10 mL, PRN  sodium chloride flush, 10 mL, PRN  morphine, 2 mg, Q4H PRN    Or  morphine, 4 mg, Q4H PRN  fentanNYL, 100 mcg, Q1H PRN  sodium chloride flush, 10 mL, PRN  promethazine, 12.5 mg, Q6H PRN    Or  ondansetron, 4 mg, Q6H PRN        SUPPORT DEVICES: [x] Ventilator [] BIPAP  [] Nasal Cannula  VENT SETTINGS (Comprehensive) (if applicable):  Vent Information  $Ventilation: Off Vent  Skin Assessment: Clean, dry, & intact  Suction Catheter Diameter: 14  Equipment ID: [id number]  Equipment Changed: Expiratory Filter  Vent Type: Servo i  Vent Mode: (S) CPAP  Vt Ordered: 600 mL  Pressure Ordered: 8  Rate Set: 16 bmp  Pressure Support: 8 cmH20  FiO2 : 30 %  SpO2: 99 %  SpO2/FiO2 ratio: 333.33  Sensitivity: 3  PEEP/CPAP: 5  I Time/ I Time %: 0.85 s  Humidification Source: E  Nitric Oxide/Epoprostenol In Use?: No  Additional Respiratory  Assessments  Pulse: 134  Resp: 15  SpO2: 99 %  $End Tidal CO2: 38  Position: Semi-Weiss's  Humidification Source: HME  Oral Care Completed?: Yes  Oral Care: Mouth swabbed, Mouth moisturizer, Mouth suctioned  Subglottic Suction Done?: Yes  Skin barrier applied: No    Lactic Acid:   Lab Results   Component Value Date    LACTA NOT REPORTED 10/03/2017     DATA:  Complete Blood Count:   Recent Labs     09/01/20 0830 09/02/20 0447 09/03/20  0552   WBC 22.0* 16.8* 17.9*   HGB 11.3* 10.4* 8.5*   MCV 86.0 87.3 90.1   * 440 376   RBC 4.15* 3.85* 3.12*   HCT 35.7* 33.6* 28.1*   MCH 27.2 27.0 27.2   MCHC 31.7 31.0 30.2   RDW 12.7 13.0 13.0   MPV 10.1 10.3 10.4        PT/INR:  No results found for: PROTIME, INR  PTT:  No results found for: APTT, PTT    Basal Metabolic Profile:   Recent Labs     09/01/20 2039 09/02/20  0447 09/03/20  0552    140 138   K 4.0 4.2 4.5   BUN 34* 33* 32*   CREATININE 1.72* 1.58* 1.83*   CL 96* 100 104   CO2 27 24 23      Magnesium:   Lab Results   Component Value Date    MG 1.8 09/02/2020    MG 1.6 09/01/2020    MG 1.8 09/01/2020     Phosphorus:   Lab Results   Component Value Date    PHOS 3.6 09/01/2020    PHOS 4.2 09/01/2020    PHOS 4.1 09/01/2020     S. Calcium:  Recent Labs     09/03/20  0552   CALCIUM 7.8*     S. Ionized Calcium:No results for input(s): IONCA in the last 72 hours. Urinalysis:   Lab Results   Component Value Date    NITRU NEGATIVE 09/02/2020    COLORU RED 09/02/2020    PHUR 8.5 09/02/2020    WBCUA TOO NUMEROUS TO COUNT 09/02/2020    RBCUA TOO NUMEROUS TO COUNT 09/02/2020    MUCUS NOT REPORTED 09/02/2020    TRICHOMONAS NOT REPORTED 09/02/2020    YEAST NOT REPORTED 09/02/2020    BACTERIA NOT REPORTED 09/02/2020    SPECGRAV 1.019 09/02/2020    LEUKOCYTESUR LARGE 09/02/2020    UROBILINOGEN Normal 09/02/2020    BILIRUBINUR NEGATIVE 09/02/2020    GLUCOSEU NEGATIVE 09/02/2020    KETUA NEGATIVE 09/02/2020    AMORPHOUS NOT REPORTED 09/02/2020       CARDIAC ENZYMES: No results for input(s): CKMB, CKMBINDEX, TROPONINI in the last 72 hours.     Invalid input(s): CKTOTAL;3  BNP: No results for input(s): BNP in the last 72 hours. LFTS  Recent Labs     08/31/20  1354 09/02/20  0020   ALKPHOS 104 92   ALT 23 24   AST 21 47*   BILITOT 0.50 0.43   BILIDIR 0.14 0.24   LABALBU 3.6 3.1*       AMYLASE/LIPASE/AMMONIA  Recent Labs     08/31/20  1354   AMYLASE 64   LIPASE 96*       Last 3 Blood Glucose:   Recent Labs     09/01/20  0109 09/01/20  0455 09/01/20  0830 09/01/20  1223 09/01/20  1659 09/01/20  2039 09/02/20  0447 09/03/20  0552   GLUCOSE 198* 186* 165* 201* 136* 163* 256* 313*      HgBA1c:    Lab Results   Component Value Date    LABA1C 16.4 06/22/2020         TSH:    Lab Results   Component Value Date    TSH 0.68 12/12/2016     ANEMIA STUDIES  No results for input(s): LABIRON, TIBC, FERRITIN, [id number], FOLATE, OCCULTBLD in the last 72 hours. Cultures during this admission:     Blood cultures:                 [] None drawn      [x] Negative             []  Positive (Details:  )  Urine Culture:                   [] None drawn      [x] Negative             []  Positive (      Chest Xray (9/3/2020): Impression    Line placement as above         Expiratory exam with interval increase in now mild streaky bibasilar    atelectasis         ASSESSMENT:   Active Problems:    Small bowel obstruction (HCC)  Resolved Problems:    * No resolved hospital problems. *      PLAN:       -Proximal small bowel obstruction secondary infiltrative lower pole left renal mass s/p laparoscopic robotic nephrectomy and Exploratory laparotomy bowel resection of proximal jejunum and descending colon with mobilization of splenic flexure and primary anastomosis of small bowel and colon along with placement of gastrostomy tube. POD-1- intubated, sedated on 40 of propofol. Continued on mechanical ventilation PRVC mode FiO2 30%. Weaning this morning. Likely extubation as patient tolerates weaning. Postoperatively hemodynamically stable except sinus tachycardia. Hb stable at 8.5 . Receiving IV Venofer.   U/O- 1.6 L/24hrs, NG output 145(as charted). Perioperative blood loss 250ml  Pain control-I.V fentanyl 100 q. 1, morphine 2-4 mg every 4  NG to LIWS  G-tube in place. N.p.o. for now. Bryant in place. General surgery and urology recommendations noted. -Sinus tachycardia likely related to dehydration and blood loss. IV Lopressor as PRN. Continued on IV hydration at 150 ml/hr. Receiving IV Venofer.    -Hyperglycemia-blood glucose 345 this morning. Started on insulin drip per hyperglycemia protocol for goal blood glucose between 140-1 80. POCT glucose checks every 4 hours. Titrate insulin drip per blood glucose reading. De-escalate as needed. -Diabetes mellitus type 2 HbA1c 16.4(6/22)-follow-up repeat HbA1c. Recommend Lantus at time of discharge. Follow-up with PCP for antidiabetic medication management. -Sepsis secondary to UTI   Blood cultures and urine culture showed no growth till date. Continued on IV cefepime, vancomycin and Flagyl postoperatively. Discontinue vancomycin for now. De-escalate antibiotics as needed.       -Right renal calculi s/p  cystoscopy and right ureteral stent placement 09/01 at [facility]. [facility]. Bryant is in place. Urology recommendations noted.     -DEBBI on CKD stage II (baseline Cr 1.3-1.7)  DEBBI likely prerenal nonoliguric type secondary to hypovolemia from perioperative blood loss from dehydration. Postoperatively creatinine 1.83. Continued on IV hydration. Daily BMP. Monitor intake output. - Essential hypertension  -Patient is on Lopressor 50 mg twice daily, lisinopril 20 mg daily, hydralazine 25 mg 3 times daily at home.  Resume as needed.          Feeding Diet: Diet NPO Effective Now  Fluids-iv fluids 150  Family updated   Analgesic- fentanyl 100 q. 1, morphine 2-4 milligrams every 4  Sedation 40 mics of propofol   Thrombo-prophylaxis s/c heparin   Mobility fair   Heads up 30 Degrees  Ulcer prophylaxis I.v pepcid 20 bid  Glycemic control insulin gtt  Spontaneous breathing trial Pressure Support 8 cm water and PEEP 5 cm water  Bowel regimen/urine output WNL   Indwelling catheter/lines Bryant is in place, gastrostomy tube. Vj-egsafetgec-lbwtic extubation per weaning trial.          Rocco Cantrell M.D.              Department of Internal Medicine/ Critical care  [address] [facility] ([place])             9/3/2020, 11:28 AM

## 2020-09-03 NOTE — PLAN OF CARE
Problem: Skin Integrity:  Goal: Will show no infection signs and symptoms  Description: Will show no infection signs and symptoms  9/2/2020 2158 by Mono Watson RN  Outcome: Ongoing  9/2/2020 1146 by John Hayes RN  Outcome: Ongoing  Goal: Absence of new skin breakdown  Description: Absence of new skin breakdown  9/2/2020 2158 by Mono Watson RN  Outcome: Ongoing  9/2/2020 1146 by John Hayes RN  Outcome: Ongoing     Problem: Falls - Risk of:  Goal: Will remain free from falls  Description: Will remain free from falls  9/2/2020 2158 by Mono Watson RN  Outcome: Ongoing  9/2/2020 1146 by John Hayes RN  Outcome: Ongoing  Goal: Absence of physical injury  Description: Absence of physical injury  9/2/2020 2158 by Mono Watson RN  Outcome: Ongoing  9/2/2020 1146 by John Hayes RN  Outcome: Ongoing     Problem: Discharge Planning:  Goal: Discharged to appropriate level of care  Description: Discharged to appropriate level of care  9/2/2020 2158 by Mono Watson RN  Outcome: Ongoing  9/2/2020 1146 by John Hayes RN  Outcome: Not Met This Shift     Problem: Infection, Septic Shock:  Goal: Will show no infection signs and symptoms  Description: Will show no infection signs and symptoms  9/2/2020 2158 by Mono Watson RN  Outcome: Ongoing  9/2/2020 1146 by John Hayes RN  Outcome: Ongoing     Problem: Serum Glucose Level - Abnormal:  Goal: Ability to maintain appropriate glucose levels will improve  Description: Ability to maintain appropriate glucose levels will improve  9/2/2020 2158 by Mono Watson RN  Outcome: Ongoing  9/2/2020 1146 by John Hayes RN  Outcome: Ongoing     Problem: Nutrition  Goal: Optimal nutrition therapy  Description: Nutrition Problem #1: Inadequate oral intake  Intervention: Food and/or Nutrient Delivery: Continue NPO. Start nutrition as able; Monitor plans re:PICC placement/ initiation of parenteral nutrition support.   Nutritional Goals: meet % of estimated nutrition needs     Outcome: Ongoing     Problem: OXYGENATION/RESPIRATORY FUNCTION  Goal: Patient will maintain patent airway  9/2/2020 2158 by Misael Gardner RN  Outcome: Ongoing  9/2/2020 2024 by Nataliia August RCP  Outcome: Ongoing  9/2/2020 1801 by Carleen Simpson RCP  Outcome: Ongoing  Goal: Patient will achieve/maintain normal respiratory rate/effort  Description: Respiratory rate and effort will be within normal limits for the patient  9/2/2020 2158 by Misael Gardner RN  Outcome: Ongoing  9/2/2020 2024 by Nataliia August RCP  Outcome: Ongoing  9/2/2020 1801 by Carleen Simpson RCP  Outcome: Ongoing     Problem: MECHANICAL VENTILATION  Goal: Patient will maintain patent airway  9/2/2020 2158 by Misael Gardner RN  Outcome: Ongoing  9/2/2020 2024 by Nataliia August RCP  Outcome: Ongoing  9/2/2020 1801 by Carleen Simpson RCP  Outcome: Ongoing  Goal: Oral health is maintained or improved  9/2/2020 2158 by Misael Gardner RN  Outcome: Ongoing  9/2/2020 2024 by Nataliia August RCP  Outcome: Ongoing  9/2/2020 1801 by Carleen Simpson RCP  Outcome: Ongoing  Goal: ET tube will be managed safely  9/2/2020 2158 by Misael Gardner RN  Outcome: Ongoing  9/2/2020 2024 by Nataliia August RCP  Outcome: Ongoing  9/2/2020 1801 by Carleen Simpson RCP  Outcome: Ongoing  Goal: Ability to express needs and understand communication  9/2/2020 2158 by Misael Gardner RN  Outcome: Ongoing  9/2/2020 2024 by Nataliia August RCP  Outcome: Ongoing  9/2/2020 1801 by Carleen Simpson RCP  Outcome: Ongoing  Goal: Mobility/activity is maintained at optimum level for patient  9/2/2020 2158 by Misael Gardner RN  Outcome: Ongoing  9/2/2020 2024 by Nataliia August RCP  Outcome: Ongoing  9/2/2020 1801 by Carleen Simpson RCP  Outcome: Ongoing     Problem: Restraint Use - Nonviolent/Non-Self-Destructive Behavior:  Goal: Absence of restraint indications  Description: Absence of restraint indications  Outcome: Ongoing  Goal: Absence of restraint-related injury  Description: Absence of restraint-related injury  Outcome: Ongoing

## 2020-09-03 NOTE — PLAN OF CARE
Problem: OXYGENATION/RESPIRATORY FUNCTION  Goal: Patient will maintain patent airway  9/2/2020 2024 by Paulett Collet, RCP  Outcome: Ongoing     Problem: OXYGENATION/RESPIRATORY FUNCTION  Goal: Patient will achieve/maintain normal respiratory rate/effort  Description: Respiratory rate and effort will be within normal limits for the patient  9/2/2020 2024 by Paulett Collet, RCP  Outcome: Ongoing     Problem: MECHANICAL VENTILATION  Goal: Patient will maintain patent airway  9/2/2020 2024 by Paulett Collet, RCP  Outcome: Ongoing     Problem: MECHANICAL VENTILATION  Goal: Oral health is maintained or improved  9/2/2020 2024 by Paulett Collet, RCP  Outcome: Ongoing     Problem: MECHANICAL VENTILATION  Goal: ET tube will be managed safely  9/2/2020 2024 by Paulett Collet, RCP  Outcome: Ongoing     Problem: MECHANICAL VENTILATION  Goal: Ability to express needs and understand communication  9/2/2020 2024 by Paulett Collet, RCP  Outcome: Ongoing     Problem: MECHANICAL VENTILATION  Goal: Mobility/activity is maintained at optimum level for patient  9/2/2020 2024 by Paulett Collet, RCP  Outcome: Ongoing

## 2020-09-03 NOTE — OP NOTE
[address] [facility]                                OPERATIVE REPORT    PATIENT NAME: Esha Flores                   :        1960  MED REC NO:   [id number]                             ROOM:       0107  ACCOUNT NO:   [de-identified]                           ADMIT DATE: 2020  PROVIDER:     Mike Arredondo    DATE OF PROCEDURE:  2020    SURGEON:  Mike Arredondo MD    CO-SURGEON:  Monda Olszewski. Bernadette Morris MD    ASSISTANT:  Verner Fuse, MD    PREOPERATIVE DIAGNOSES:  Left renal mass with local advancement with  small-bowel obstruction. POSTOPERATIVE DIAGNOSES:  Left renal mass with local advancement with  small-bowel obstruction. PROCEDURES:  Robotic left radical nephrectomy (difficult), lysis of  adhesions, bowel resection. ANESTHESIA:  General.    COMPLICATIONS:  None. ESTIMATED BLOOD LOSS:  250 mL. NARRATIVE PROCEDURE:  This is a 68-year-old white male who has history  of a large 8-cm left lower pole renal mass. He was scheduled to have  nephrectomy; although, he presented earlier than his surgery to the  hospital with signs of small-bowel obstruction. CT scan showed local  advancement of his tumor causing involvement of the small-bowel, causing  small-bowel obstruction. Given this, he was admitted. NG tube was  placed; although, he was not improving, and given the signs from the CT  scan, the above procedure was scheduled. All the risks and benefits  were explained to the patient. Informed consent was obtained. This 68-year-old white male was brought back to the operating room and  positioned in the supine position. A Bryant catheter was placed in the  sterile technique, and after general anesthesia was started, he was  positioned in the left flank position. All the pressure points were  padded. An axillary roll was placed.   He was secured to the bed, and he  was sterilely prepped and draped in standard fashion. A Veress needle  was placed to the left side of the abdomen into the peritoneum. Pneumoperitoneum was obtained. I then placed an 8-mm port through the  midline through a small incision and into the peritoneum under direct  vision. No injuries were noted. The rest of the ports were placed in  the usual fashion. Two 8-mm robotic ports were placed in a triangular  fashion, and a 12-mm assistant port was placed in the midline along with  a 5-mm assistant port in the midline. I examined the peritoneum. The  large-bowel and the small-bowel were both adhesed onto this tumor. There was a phlegmon of the lower pole of the kidney, plus large-bowel  and small-bowel, with a lot of surrounding inflammation; clear signs of  local advancement of disease. There were also some surrounding  adhesions. I performed robotic lysis of adhesions for 10 minutes and  then I began mobilizing the superior and inferior portions of the  large-bowel in this area, which were not as involved. This was done by  incising on the white line of Toldt. I identified the plane between  posterior peritoneum and Gerota's fascia. I carefully dissected this  plane where I could; although, as I approached the phlegmon, there was a  pus pocket and pus draining, which was sucked out and irrigated, again  confirming local advancement of this disease process. I left this  phlegmon in place and I worked around this to perform nephrectomy. I  carefully continued dissecting Gerota's fascia. I mobilized the spleen  to gain extra exposure. I dissected the superior part of the kidney and  the medial portion. I identified the renal vein. I identified the  gonadal.  I ligated both of these with Hem-o-taylor clips and I divided  both of these structures. I then continued to dissect the renal vein. I lifted the kidney carefully off the psoas muscle as much as I could  given that the lower pole was tethered.   I then identified the renal  artery. I dissected the hilum en bloc and I used an Endo LARISSA stapler to  staple the hilum. Once this was done, I continued using the Endo LARISSA  stapler and I dissected the superomedial attachment to the kidney. Then, I sharply dissected the lateral attachments. As I approached the  lower pole, the tumor was clearly advancing into the sidewall, past the  posterior peritoneum, into the body wall subcutaneous tissue and muscle. I excised this portion entirely. There was again pus noted in this  area. I then continued mobilizing the lateral lower pole away from the  phlegmon. I ligated and divided the gonadal in this region with Endo  LARISSA stapler. I then identified the ureter. I used Hem-o-taylor clips to  ligate the ureter and divide the ureter and then I continued  circumferentially mobilizing the kidney laterally and posteriorly around  the phlegmon. Once the kidney was mostly detached, except for the  phlegmon, I then irrigated this area. I made sure there was adequate  hemostasis. I made sure the spleen was intact and uninjured and again I  made sure there was adequate hemostasis. This was definitely a  difficult dissection given the phlegmon and the surrounding inflammation  and that the kidney could not be lifted off the psoas muscle  appropriately. It made it a very difficult dissection. Once this  portion was complete, the whole phlegmon with the small-bowel,  large-bowel, and the kidney were able to be flipped medially. I, again,  made sure there was adequate hemostasis. Then, we undocked the robot,  we removed the instruments, we closed the skin incisions with staples;  and then the patient was re-positioned in the supine position. He was  then again sterilely prepped and draped in standard fashion and the  general surgery team started their portion of the procedure for the  bowel resection. This ends my portion of the procedure.   I was present  through the general surgery portion of the procedure also. The patient  will be admitted after this for routine postoperative care. I was  present and scrubbed throughout the entire case.         Delores Valencia    D: 09/02/2020 16:31:50       T: 09/03/2020 0:10:07     [id number]  Job#: [id number]     Doc#: [id number]    CC:  Jeremy Hernandez

## 2020-09-03 NOTE — PROGRESS NOTES
Michelle Clement Fransisco Lipps, Eva Pierson  Urology Progress Note     Subjective:   Diet: N.p.o.   Continues to be intubated, sedated and mechanically ventilated   no acute events overnight  Blood pressure -mild hypotension  Received IV Lopressor as needed for tachycardia/heart rate in the 120s this a.m.   No documented fevers    Ambulation : None  Flatus/ BM : None    Patient Vitals for the past 24 hrs:   BP Temp Temp src Pulse Resp SpO2 Weight   09/03/20 0500 108/69 -- -- 127 16 100 % --   09/03/20 0400 110/70 97.9 °F (36.6 °C) Oral 125 16 100 % 257 lb 8 oz (116.8 kg)   09/03/20 0327 -- -- -- 131 17 100 % --   09/03/20 0300 106/71 -- -- 130 16 100 % --   09/03/20 0200 135/72 -- -- 129 16 100 % --   09/03/20 0100 120/69 -- -- 122 16 100 % --   09/03/20 0002 -- -- -- 120 16 100 % --   09/03/20 0000 112/72 98.4 °F (36.9 °C) Oral 120 16 100 % --   09/02/20 2300 113/73 -- -- 118 16 100 % --   09/02/20 2200 119/83 -- -- 133 16 100 % --   09/02/20 2100 121/84 97.9 °F (36.6 °C) Oral 133 17 100 % --   09/02/20 2000 121/84 -- -- 133 17 100 % --   09/02/20 1938 -- -- -- 127 16 100 % --   09/02/20 1900 123/78 -- -- 133 18 100 % --   09/02/20 1819 -- -- -- -- 23 100 % --   09/02/20 1800 127/88 -- -- 131 18 100 % --   09/02/20 1749 -- -- -- -- 18 -- --   09/02/20 1745 -- 97.9 °F (36.6 °C) Oral -- -- -- --   09/02/20 1120 (!) 119/100 98.4 °F (36.9 °C) Temporal 108 13 96 % --   09/02/20 1100 114/77 -- -- 112 16 99 % --   09/02/20 1045 -- -- -- 104 13 -- --   09/02/20 1030 -- -- -- 109 15 -- --   09/02/20 1015 -- -- -- 111 16 -- --   09/02/20 1000 124/79 -- -- 112 15 98 % --   09/02/20 0900 135/72 -- -- 107 18 98 % --   09/02/20 0800 124/77 -- -- 107 16 99 % --   09/02/20 0730 -- 97.6 °F (36.4 °C) Oral -- -- -- --   09/02/20 0700 -- -- -- 107 13 96 % --       Intake/Output Summary (Last 24 hours) at 9/3/2020 0643  Last data filed at 9/3/2020 0430  Gross per 24 hour   Intake 6303 ml   Output 1860 ml   Net 4443 ml

## 2020-09-03 NOTE — PROCEDURES
History/labs/allergies reviewed  Placed by Ebony Krause RN  Assisted by MERNA Alcazar RN  Consent signed and obtained by physician  Time out performed using two identifiers  Catheter type riple  lumen picc  Product type solo2 w 3cg  Lot # [id number]  Expiration date 12/31/2020  Catheter size 5  [place]  Trimmed at 45 cm  Total length 46 cm  External catheter length 1 cm  Location R BV  Number of attempts 1  Estimated blood loss 1 ml  Pre procedure cardiac Rhythm ST per bedside telemetry and/or 3CG Tracing. Placement verified by- CXR and/or 3cg Max P wave noted by amplitude changes of the P wave, positive blood return, flushes easily  Special equipment used- ultrasound, micro-introducer technique and 3cg technology if indicated  Catheter secured with statlock  Dressing applied- Tegaderm CHG  Lidocaine administered intradermally conc. 1% 1 mL  PICC line education:   [ X ] Discussed with patient/Family or POA prior to signing Informed Procedural Consent. Risks and Benefits along with reason for procedure were discussed and teaching was reinforced with an education handout on PICC insertion. CDC FAQ Catheter Associated Blood Stream Infections and [email or web address] REV. 7/13 Nursing and Bard Booklet left at bedside or in chart. Patient (Family or POA) acknowledged understanding of information taught and agreed to procedure. [  ] Was not discussed with patient/family or POA due to pts medical status at time of procedure. pts family or POA not available to discuss PICC education.  CDC FAQ Catheter Associated Blood Stream Infections and [email or web address] REV. 7/13 Nursing and Bard Booklet left at bedside or in chart

## 2020-09-03 NOTE — PROGRESS NOTES
Pharmacy Note     Renal Dose Adjustment    Ignacio Mojica II is a 56 y.o. male. Pharmacist assessment of renally cleared medications. Recent Labs     09/02/20  0447 09/03/20  0552   BUN 33* 32*       Recent Labs     09/02/20 0447 09/03/20  0552   CREATININE 1.58* 1.83*       Estimated Creatinine Clearance: 54 mL/min (A) (based on SCr of 1.83 mg/dL (H)).   Estimated CrCl using Ideal Body Weight: 44 mL/min (based on IBW 75.3 kg)    Height:   Ht Readings from Last 1 Encounters:   09/01/20 5' 11\" (1.803 m)     Weight:  Wt Readings from Last 1 Encounters:   09/03/20 257 lb 8 oz (116.8 kg)       The following medication dose has been adjusted based upon renal function per P&T Guidelines:             Pepcid BID to Daily  Cefepime 2gm Q12H to 1gm Q12H    Delfin Chacon PharmD BCPS [facility]  9/3/2020 9:50 AM

## 2020-09-03 NOTE — PROGRESS NOTES
.pro  General Surgery:  Daily Progress Note  POD#1 s/p  R nephrectomy, SB resection  PATIENT NAME: Mady Pantoja II     TODAY'S DATE: 9/3/2020, 5:08 AM    SUBJECTIVE:     Pt seen and examined at bedside. No acute events overnight. On exam patient is intubated and sedated, opens eyes with stimulus. Postop day 1 right nephrectomy with small bowel resection. Gastrostomy tube with 50 mL output green bile. Good urine output with clear juancarlos fluid in Bryant this morning. Afebrile, T-max 99.1. OBJECTIVE:   VITALS:  /69   Pulse 131   Temp 98.4 °F (36.9 °C) (Oral)   Resp 17   Ht 5' 11\" (1.803 m)   Wt 257 lb 0.9 oz (116.6 kg)   SpO2 100%   BMI 35.85 kg/m²      INTAKE/OUTPUT:      Intake/Output Summary (Last 24 hours) at 9/3/2020 5736  Last data filed at 9/3/2020 0030  Gross per 24 hour   Intake 5883 ml   Output 1600 ml   Net 4283 ml       PHYSICAL EXAM:  General Appearance: Debated and sedated  HEENT:  Normocephalic, atraumatic, mucus membranes moist. NGT in place  Heart: Tachycardic, regular rhythm  Lungs: intubated  Abdomen: Soft, distended, winces with palpation of abdomen. Gastrostomy tube in place with green liquid in collection. Dressings pain, dry, intact  Extremities: No cyanosis, pitting edema, rashes noted. Skin: Skin color, texture, turgor normal. No rashes or lesions.       Data:  CBC:   Recent Labs     08/31/20 2008 09/01/20  0830 09/02/20  0447   WBC 16.6* 22.0* 16.8*   HGB 10.9* 11.3* 10.4*   * 514* 440     Chemistry:   Recent Labs     09/01/20  0455  09/01/20  1223 09/01/20  1440 09/01/20  1659 09/01/20  2039 09/02/20  0020 09/02/20  0447      < > 138  --  140 140  --  140   K 4.0   < > 4.4  --  3.9 4.0  --  4.2   CL 95*   < > 94*  --  95* 96*  --  100   CO2 28   < > 30  --  31 27  --  24   GLUCOSE 186*   < > 201*  --  136* 163*  --  256*   BUN 36*   < > 36*  --  34* 34*  --  33*   CREATININE 1.76*   < > 1.83*  --  1.66* 1.72*  --  1.58*   MG 1.8   < > 1.9  --  1.8 1.6 1.8  --    ANIONGAP 14   < > 14  --  14 17  --  16   LABGLOM 39*   < > 38*  --  42* 40*  --  45*   GFRAA 48*   < > 46*  --  51* 49*  --  54*   CALCIUM 9.9   < > 9.9  --  9.5 9.2  --  8.7   CAION  --   --   --   --   --   --  1.11*  --    PHOS 3.7   < > 4.1  --  4.2 3.6  --   --    TROPHS 17  --   --  14  --  15  --   --    MYOGLOBIN 694*  --   --  439*  --  291*  --   --     < > = values in this interval not displayed. Hepatic:   Recent Labs     08/31/20  1354 09/02/20  0020   AST 21 47*   ALT 23 24   ALKPHOS 104 92   BILITOT 0.50 0.43   BILIDIR 0.14 0.24     Coagulation:   Recent Labs     08/31/20  1354 09/02/20  0020   PROT 9.3* 7.9         Radiology Review:    Xr Abdomen (kub) (single Ap View)    Addendum Date: 9/1/2020    ADDENDUM: Dose area product 1037.1 mGy per cm squared and fluoro time 5.6 seconds     Result Date: 9/1/2020  EXAMINATION: ONE SUPINE XRAY VIEW(S) OF THE ABDOMEN 9/1/2020 9:32 pm COMPARISON: 16 hours ago HISTORY: ORDERING SYSTEM PROVIDED HISTORY: cysto with right stent TECHNOLOGIST PROVIDED HISTORY: cysto with right stent Reason for Exam: cysto Acuity: Acute Type of Exam: Initial FINDINGS: Double-J ureteral stent present. On the contralateral side there appears to be a nephrolith. Please correlate with clinical service     Xr Chest Portable    Result Date: 9/1/2020  EXAMINATION: ONE XRAY VIEW OF THE CHEST 9/1/2020 8:31 pm COMPARISON: 05/14/2010 HISTORY: ORDERING SYSTEM PROVIDED HISTORY: evaluation TECHNOLOGIST PROVIDED HISTORY: evaluation Reason for Exam: evaluation Acuity: Acute Type of Exam: Initial FINDINGS: Cardiomediastinal silhouette is unchanged in size. No pulmonary consolidation, pleural effusion, or pneumothorax. No acute osseous abnormality. Enteric tube is below the diaphragm with tip outside the field of view. No acute cardiopulmonary abnormality.      Xr Abdomen For Ng/og/ne Tube Placement    Result Date: 9/1/2020  EXAMINATION: ONE SUPINE XRAY VIEW(S) OF THE ABDOMEN

## 2020-09-03 NOTE — PROGRESS NOTES
Physical Therapy  DATE: 9/3/2020    NAME: Mark Ballard II  MRN: [id number]   : 1960    Patient not seen this date for Physical Therapy due to:  [] Blood transfusion in progress  [] Hemodialysis  []  Patient Declined  [] Spine Precautions   [x] Strict Bedrest  [] Surgery/ Procedure  [] Testing      [x] Other: Wean trial per chart. PT will check back as time allows or 20. [] PT being discontinued at this time. Patient independent. No further needs. [] PT being discontinued at this time as the patient has been transferred to palliative care. No further needs.     Jane Bryant, PT

## 2020-09-03 NOTE — PROGRESS NOTES
Discussed with general surgery resident regarding feeding. Patient to stay n.p.o for now. No tube feeds or TPN to be initiated per general surgery recommendations.       Maggie Wright MD      Department of Internal Medicine  [facility]         9/3/2020, 12:03 PM

## 2020-09-03 NOTE — PROGRESS NOTES
POST-OPERATIVE PROGRESS NOTE    Patient: Kimberly Plunkett II    DATE: 9/3/2020    Surgery: laparoscopic robotic right nephrectomy and small bowel resection with primary anastomosis, left partial colectomy with anastomosis, open gastrostomy tube     Subjective:   Pt remains intubated and sedated. Appears comfortable in bed. Patient is tachycardic, but otherwise hemodynamically stable and tolerating vent settings. Abdominal dressing is clean, dry and intact. Objective:  Vital signs and Nurse's note reviewed  Post-op vital signs: stable    /69   Pulse 131   Temp 98.4 °F (36.9 °C) (Oral)   Resp 17   Ht 5' 11\" (1.803 m)   Wt 257 lb 0.9 oz (116.6 kg)   SpO2 100%   BMI 35.85 kg/m²    Gen:   Intubated and sedated   CV: ST on monitor   Resp: symmetrical chest expansion  Abd:  Soft, dressing clean, dry and intact   Ext:  Warm, no cyanosis or edema    Assessment:   POD # 0 S/P  laparoscopic robotic right nephrectomy and small bowel resection with primary anastomosis, left partial colectomy with anastomosis, open gastrostomy tube     Recovering well post-op     Plan:    Continue current care  Continue current pain regimen   Diet: NPO   Gastrostomy tube to gravity     Electronically signed by Kari Prasad DO  on 9/3/2020 at 5:31 AM

## 2020-09-04 LAB
ABSOLUTE EOS #: 0.35 K/UL (ref 0–0.44)
ABSOLUTE IMMATURE GRANULOCYTE: 0.16 K/UL (ref 0–0.3)
ABSOLUTE LYMPH #: 1.38 K/UL (ref 1.1–3.7)
ABSOLUTE MONO #: 1.28 K/UL (ref 0.1–1.2)
ANION GAP SERPL CALCULATED.3IONS-SCNC: 11 MMOL/L (ref 9–17)
BASOPHILS # BLD: 0 % (ref 0–2)
BASOPHILS ABSOLUTE: 0.06 K/UL (ref 0–0.2)
BUN BLDV-MCNC: 28 MG/DL (ref 8–23)
BUN/CREAT BLD: ABNORMAL (ref 9–20)
CALCIUM SERPL-MCNC: 8.3 MG/DL (ref 8.6–10.4)
CHLORIDE BLD-SCNC: 110 MMOL/L (ref 98–107)
CO2: 21 MMOL/L (ref 20–31)
CREAT SERPL-MCNC: 1.8 MG/DL (ref 0.7–1.2)
DIFFERENTIAL TYPE: ABNORMAL
EOSINOPHILS RELATIVE PERCENT: 2 % (ref 1–4)
GFR AFRICAN AMERICAN: 47 ML/MIN
GFR NON-AFRICAN AMERICAN: 38 ML/MIN
GFR SERPL CREATININE-BSD FRML MDRD: ABNORMAL ML/MIN/{1.73_M2}
GFR SERPL CREATININE-BSD FRML MDRD: ABNORMAL ML/MIN/{1.73_M2}
GLUCOSE BLD-MCNC: 125 MG/DL (ref 75–110)
GLUCOSE BLD-MCNC: 133 MG/DL (ref 75–110)
GLUCOSE BLD-MCNC: 150 MG/DL (ref 75–110)
GLUCOSE BLD-MCNC: 167 MG/DL (ref 70–99)
GLUCOSE BLD-MCNC: 169 MG/DL (ref 75–110)
GLUCOSE BLD-MCNC: 194 MG/DL (ref 75–110)
GLUCOSE BLD-MCNC: 232 MG/DL (ref 75–110)
HCT VFR BLD CALC: 25.7 % (ref 40.7–50.3)
HCT VFR BLD CALC: 32.2 % (ref 40.7–50.3)
HEMOGLOBIN: 7.6 G/DL (ref 13–17)
HEMOGLOBIN: 9.4 G/DL (ref 13–17)
IMMATURE GRANULOCYTES: 1 %
LYMPHOCYTES # BLD: 9 % (ref 24–43)
MAGNESIUM: 1.9 MG/DL (ref 1.6–2.6)
MCH RBC QN AUTO: 26.9 PG (ref 25.2–33.5)
MCHC RBC AUTO-ENTMCNC: 29.6 G/DL (ref 28.4–34.8)
MCV RBC AUTO: 90.8 FL (ref 82.6–102.9)
MONOCYTES # BLD: 8 % (ref 3–12)
NRBC AUTOMATED: 0 PER 100 WBC
PDW BLD-RTO: 12.8 % (ref 11.8–14.4)
PHOSPHORUS: 2.3 MG/DL (ref 2.5–4.5)
PLATELET # BLD: 316 K/UL (ref 138–453)
PLATELET ESTIMATE: ABNORMAL
PMV BLD AUTO: 10.5 FL (ref 8.1–13.5)
POTASSIUM SERPL-SCNC: 4.2 MMOL/L (ref 3.7–5.3)
RBC # BLD: 2.83 M/UL (ref 4.21–5.77)
RBC # BLD: ABNORMAL 10*6/UL
SEG NEUTROPHILS: 80 % (ref 36–65)
SEGMENTED NEUTROPHILS ABSOLUTE COUNT: 12.91 K/UL (ref 1.5–8.1)
SODIUM BLD-SCNC: 142 MMOL/L (ref 135–144)
WBC # BLD: 16.1 K/UL (ref 3.5–11.3)
WBC # BLD: ABNORMAL 10*3/UL

## 2020-09-04 PROCEDURE — 80048 BASIC METABOLIC PNL TOTAL CA: CPT

## 2020-09-04 PROCEDURE — 6360000002 HC RX W HCPCS: Performed by: STUDENT IN AN ORGANIZED HEALTH CARE EDUCATION/TRAINING PROGRAM

## 2020-09-04 PROCEDURE — 2580000003 HC RX 258: Performed by: STUDENT IN AN ORGANIZED HEALTH CARE EDUCATION/TRAINING PROGRAM

## 2020-09-04 PROCEDURE — 97535 SELF CARE MNGMENT TRAINING: CPT

## 2020-09-04 PROCEDURE — P9016 RBC LEUKOCYTES REDUCED: HCPCS

## 2020-09-04 PROCEDURE — 36430 TRANSFUSION BLD/BLD COMPNT: CPT

## 2020-09-04 PROCEDURE — 6360000002 HC RX W HCPCS: Performed by: SURGERY

## 2020-09-04 PROCEDURE — 85018 HEMOGLOBIN: CPT

## 2020-09-04 PROCEDURE — 2500000003 HC RX 250 WO HCPCS: Performed by: STUDENT IN AN ORGANIZED HEALTH CARE EDUCATION/TRAINING PROGRAM

## 2020-09-04 PROCEDURE — 6370000000 HC RX 637 (ALT 250 FOR IP): Performed by: STUDENT IN AN ORGANIZED HEALTH CARE EDUCATION/TRAINING PROGRAM

## 2020-09-04 PROCEDURE — 82947 ASSAY GLUCOSE BLOOD QUANT: CPT

## 2020-09-04 PROCEDURE — 2060000000 HC ICU INTERMEDIATE R&B

## 2020-09-04 PROCEDURE — 84100 ASSAY OF PHOSPHORUS: CPT

## 2020-09-04 PROCEDURE — 99223 1ST HOSP IP/OBS HIGH 75: CPT | Performed by: INTERNAL MEDICINE

## 2020-09-04 PROCEDURE — 97530 THERAPEUTIC ACTIVITIES: CPT

## 2020-09-04 PROCEDURE — 6370000000 HC RX 637 (ALT 250 FOR IP): Performed by: SURGERY

## 2020-09-04 PROCEDURE — 83735 ASSAY OF MAGNESIUM: CPT

## 2020-09-04 PROCEDURE — 99291 CRITICAL CARE FIRST HOUR: CPT | Performed by: INTERNAL MEDICINE

## 2020-09-04 PROCEDURE — 85025 COMPLETE CBC W/AUTO DIFF WBC: CPT

## 2020-09-04 PROCEDURE — 85014 HEMATOCRIT: CPT

## 2020-09-04 PROCEDURE — 86900 BLOOD TYPING SEROLOGIC ABO: CPT

## 2020-09-04 PROCEDURE — 97166 OT EVAL MOD COMPLEX 45 MIN: CPT

## 2020-09-04 PROCEDURE — 97162 PT EVAL MOD COMPLEX 30 MIN: CPT

## 2020-09-04 PROCEDURE — 36415 COLL VENOUS BLD VENIPUNCTURE: CPT

## 2020-09-04 RX ORDER — GABAPENTIN 300 MG/1
300 CAPSULE ORAL EVERY 8 HOURS
Status: DISCONTINUED | OUTPATIENT
Start: 2020-09-04 | End: 2020-09-11 | Stop reason: HOSPADM

## 2020-09-04 RX ORDER — INSULIN GLARGINE 100 [IU]/ML
10 INJECTION, SOLUTION SUBCUTANEOUS NIGHTLY
Status: DISCONTINUED | OUTPATIENT
Start: 2020-09-04 | End: 2020-09-05

## 2020-09-04 RX ORDER — OXYCODONE HYDROCHLORIDE 5 MG/1
5 TABLET ORAL EVERY 4 HOURS PRN
Status: DISCONTINUED | OUTPATIENT
Start: 2020-09-04 | End: 2020-09-11

## 2020-09-04 RX ORDER — METHOCARBAMOL 750 MG/1
750 TABLET, FILM COATED ORAL EVERY 6 HOURS
Status: DISCONTINUED | OUTPATIENT
Start: 2020-09-04 | End: 2020-09-11 | Stop reason: HOSPADM

## 2020-09-04 RX ORDER — LIDOCAINE 4 G/G
2 PATCH TOPICAL DAILY
Status: DISCONTINUED | OUTPATIENT
Start: 2020-09-04 | End: 2020-09-06 | Stop reason: CLARIF

## 2020-09-04 RX ORDER — METOPROLOL TARTRATE 5 MG/5ML
5 INJECTION INTRAVENOUS EVERY 4 HOURS
Status: DISCONTINUED | OUTPATIENT
Start: 2020-09-04 | End: 2020-09-11

## 2020-09-04 RX ORDER — ACETAMINOPHEN 500 MG
1000 TABLET ORAL EVERY 8 HOURS
Status: DISCONTINUED | OUTPATIENT
Start: 2020-09-04 | End: 2020-09-11

## 2020-09-04 RX ORDER — FUROSEMIDE 10 MG/ML
20 INJECTION INTRAMUSCULAR; INTRAVENOUS ONCE
Status: COMPLETED | OUTPATIENT
Start: 2020-09-04 | End: 2020-09-04

## 2020-09-04 RX ORDER — 0.9 % SODIUM CHLORIDE 0.9 %
20 INTRAVENOUS SOLUTION INTRAVENOUS ONCE
Status: DISCONTINUED | OUTPATIENT
Start: 2020-09-04 | End: 2020-09-11 | Stop reason: HOSPADM

## 2020-09-04 RX ADMIN — METOCLOPRAMIDE 10 MG: 5 INJECTION, SOLUTION INTRAMUSCULAR; INTRAVENOUS at 00:32

## 2020-09-04 RX ADMIN — I.V. FAT EMULSION 250 ML: 20 EMULSION INTRAVENOUS at 17:48

## 2020-09-04 RX ADMIN — METOPROLOL TARTRATE 5 MG: 1 INJECTION, SOLUTION INTRAVENOUS at 18:11

## 2020-09-04 RX ADMIN — FENTANYL CITRATE 100 MCG: 50 INJECTION, SOLUTION INTRAMUSCULAR; INTRAVENOUS at 08:17

## 2020-09-04 RX ADMIN — GABAPENTIN 300 MG: 300 CAPSULE ORAL at 22:45

## 2020-09-04 RX ADMIN — Medication 10 ML: at 08:13

## 2020-09-04 RX ADMIN — FENTANYL CITRATE 100 MCG: 50 INJECTION, SOLUTION INTRAMUSCULAR; INTRAVENOUS at 03:22

## 2020-09-04 RX ADMIN — ACETAMINOPHEN 1000 MG: 500 TABLET ORAL at 08:13

## 2020-09-04 RX ADMIN — INSULIN LISPRO 2 UNITS: 100 INJECTION, SOLUTION INTRAVENOUS; SUBCUTANEOUS at 18:17

## 2020-09-04 RX ADMIN — METHOCARBAMOL TABLETS 750 MG: 750 TABLET, COATED ORAL at 18:11

## 2020-09-04 RX ADMIN — METRONIDAZOLE 500 MG: 500 INJECTION, SOLUTION INTRAVENOUS at 02:20

## 2020-09-04 RX ADMIN — ACETAMINOPHEN 1000 MG: 500 TABLET ORAL at 13:56

## 2020-09-04 RX ADMIN — Medication 10 ML: at 22:46

## 2020-09-04 RX ADMIN — HYDROMORPHONE HYDROCHLORIDE 0.5 MG: 1 INJECTION, SOLUTION INTRAMUSCULAR; INTRAVENOUS; SUBCUTANEOUS at 22:46

## 2020-09-04 RX ADMIN — METHOCARBAMOL TABLETS 750 MG: 750 TABLET, COATED ORAL at 09:47

## 2020-09-04 RX ADMIN — METRONIDAZOLE 500 MG: 500 INJECTION, SOLUTION INTRAVENOUS at 18:11

## 2020-09-04 RX ADMIN — METOPROLOL TARTRATE 5 MG: 1 INJECTION, SOLUTION INTRAVENOUS at 15:26

## 2020-09-04 RX ADMIN — METHOCARBAMOL TABLETS 750 MG: 750 TABLET, COATED ORAL at 13:56

## 2020-09-04 RX ADMIN — METRONIDAZOLE 500 MG: 500 INJECTION, SOLUTION INTRAVENOUS at 09:47

## 2020-09-04 RX ADMIN — FENTANYL CITRATE 100 MCG: 50 INJECTION, SOLUTION INTRAMUSCULAR; INTRAVENOUS at 01:51

## 2020-09-04 RX ADMIN — GABAPENTIN 300 MG: 300 CAPSULE ORAL at 09:46

## 2020-09-04 RX ADMIN — CEFEPIME HYDROCHLORIDE 1 G: 1 INJECTION, POWDER, FOR SOLUTION INTRAMUSCULAR; INTRAVENOUS at 15:23

## 2020-09-04 RX ADMIN — INSULIN GLARGINE 10 UNITS: 100 INJECTION, SOLUTION SUBCUTANEOUS at 21:50

## 2020-09-04 RX ADMIN — CEFEPIME HYDROCHLORIDE 1 G: 1 INJECTION, POWDER, FOR SOLUTION INTRAMUSCULAR; INTRAVENOUS at 03:25

## 2020-09-04 RX ADMIN — METOPROLOL TARTRATE 5 MG: 1 INJECTION, SOLUTION INTRAVENOUS at 11:56

## 2020-09-04 RX ADMIN — GABAPENTIN 300 MG: 300 CAPSULE ORAL at 15:23

## 2020-09-04 RX ADMIN — INSULIN LISPRO 2 UNITS: 100 INJECTION, SOLUTION INTRAVENOUS; SUBCUTANEOUS at 11:55

## 2020-09-04 RX ADMIN — METOPROLOL TARTRATE 5 MG: 1 INJECTION, SOLUTION INTRAVENOUS at 09:47

## 2020-09-04 RX ADMIN — FUROSEMIDE 20 MG: 10 INJECTION, SOLUTION INTRAMUSCULAR; INTRAVENOUS at 09:46

## 2020-09-04 RX ADMIN — METOPROLOL TARTRATE 5 MG: 1 INJECTION, SOLUTION INTRAVENOUS at 06:25

## 2020-09-04 RX ADMIN — METOPROLOL TARTRATE 5 MG: 1 INJECTION, SOLUTION INTRAVENOUS at 00:32

## 2020-09-04 RX ADMIN — HYDROMORPHONE HYDROCHLORIDE 0.5 MG: 1 INJECTION, SOLUTION INTRAMUSCULAR; INTRAVENOUS; SUBCUTANEOUS at 13:33

## 2020-09-04 RX ADMIN — INSULIN LISPRO 2 UNITS: 100 INJECTION, SOLUTION INTRAVENOUS; SUBCUTANEOUS at 21:50

## 2020-09-04 RX ADMIN — OXYCODONE HYDROCHLORIDE 5 MG: 5 TABLET ORAL at 21:50

## 2020-09-04 RX ADMIN — SODIUM CHLORIDE: 9 INJECTION, SOLUTION INTRAVENOUS at 06:33

## 2020-09-04 RX ADMIN — FAMOTIDINE 20 MG: 10 INJECTION INTRAVENOUS at 08:13

## 2020-09-04 RX ADMIN — HYDROMORPHONE HYDROCHLORIDE 0.5 MG: 1 INJECTION, SOLUTION INTRAMUSCULAR; INTRAVENOUS; SUBCUTANEOUS at 19:43

## 2020-09-04 RX ADMIN — FENTANYL CITRATE 100 MCG: 50 INJECTION, SOLUTION INTRAMUSCULAR; INTRAVENOUS at 05:20

## 2020-09-04 RX ADMIN — METOCLOPRAMIDE 10 MG: 5 INJECTION, SOLUTION INTRAMUSCULAR; INTRAVENOUS at 18:10

## 2020-09-04 RX ADMIN — HEPARIN SODIUM 5000 UNITS: 5000 INJECTION INTRAVENOUS; SUBCUTANEOUS at 15:23

## 2020-09-04 RX ADMIN — HEPARIN SODIUM 5000 UNITS: 5000 INJECTION INTRAVENOUS; SUBCUTANEOUS at 06:32

## 2020-09-04 RX ADMIN — METOPROLOL TARTRATE 5 MG: 1 INJECTION, SOLUTION INTRAVENOUS at 22:46

## 2020-09-04 RX ADMIN — METOCLOPRAMIDE 10 MG: 5 INJECTION, SOLUTION INTRAMUSCULAR; INTRAVENOUS at 06:25

## 2020-09-04 RX ADMIN — METOCLOPRAMIDE 10 MG: 5 INJECTION, SOLUTION INTRAMUSCULAR; INTRAVENOUS at 11:56

## 2020-09-04 RX ADMIN — HEPARIN SODIUM 5000 UNITS: 5000 INJECTION INTRAVENOUS; SUBCUTANEOUS at 22:46

## 2020-09-04 RX ADMIN — BISACODYL 10 MG: 10 SUPPOSITORY RECTAL at 08:17

## 2020-09-04 RX ADMIN — ACETAMINOPHEN 1000 MG: 500 TABLET ORAL at 22:45

## 2020-09-04 RX ADMIN — HYDROMORPHONE HYDROCHLORIDE 0.5 MG: 1 INJECTION, SOLUTION INTRAMUSCULAR; INTRAVENOUS; SUBCUTANEOUS at 06:23

## 2020-09-04 RX ADMIN — CALCIUM GLUCONATE: 98 INJECTION, SOLUTION INTRAVENOUS at 17:48

## 2020-09-04 RX ADMIN — HYDROMORPHONE HYDROCHLORIDE 0.5 MG: 1 INJECTION, SOLUTION INTRAMUSCULAR; INTRAVENOUS; SUBCUTANEOUS at 08:55

## 2020-09-04 ASSESSMENT — PAIN DESCRIPTION - LOCATION
LOCATION: ABDOMEN

## 2020-09-04 ASSESSMENT — PAIN SCALES - GENERAL
PAINLEVEL_OUTOF10: 8
PAINLEVEL_OUTOF10: 9
PAINLEVEL_OUTOF10: 8
PAINLEVEL_OUTOF10: 10
PAINLEVEL_OUTOF10: 8
PAINLEVEL_OUTOF10: 9
PAINLEVEL_OUTOF10: 8
PAINLEVEL_OUTOF10: 7
PAINLEVEL_OUTOF10: 6
PAINLEVEL_OUTOF10: 9
PAINLEVEL_OUTOF10: 8

## 2020-09-04 ASSESSMENT — PAIN DESCRIPTION - FREQUENCY
FREQUENCY: CONTINUOUS
FREQUENCY: CONTINUOUS

## 2020-09-04 ASSESSMENT — PAIN DESCRIPTION - PAIN TYPE
TYPE: ACUTE PAIN;SURGICAL PAIN
TYPE: ACUTE PAIN;SURGICAL PAIN
TYPE: ACUTE PAIN
TYPE: ACUTE PAIN;SURGICAL PAIN

## 2020-09-04 ASSESSMENT — PAIN DESCRIPTION - ORIENTATION
ORIENTATION: RIGHT;LEFT;MID
ORIENTATION: RIGHT;LEFT
ORIENTATION: RIGHT;LEFT

## 2020-09-04 ASSESSMENT — PAIN DESCRIPTION - DESCRIPTORS
DESCRIPTORS: DISCOMFORT
DESCRIPTORS: DISCOMFORT

## 2020-09-04 NOTE — PROGRESS NOTES
sitting up EOB, progressed to CGA. ~5 minutes.)  Standing Balance: Moderate assistance(Mod A upon standing up, intermittently requiring Min A.)  Standing Balance  Time: ~3 minutes  Activity: Standing EOB, stand pivot transfer to recliner  Comment: Slightly unsteady, no LOB. Functional Mobility  Functional - Mobility Device: No device  Activity: (stand pivot to chair)  Assist Level: Moderate assistance  Functional Mobility Comments: Mod A for small steps during stand pivot transfer from bed to chair. Pt required increased time/effort d/t increased pain. ADL  Feeding: Independent  Grooming: Minimal assistance; Increased time to complete;Setup  UE Bathing: Moderate assistance; Increased time to complete;Setup  LE Bathing: Maximum assistance; Increased time to complete;Setup  UE Dressing: Minimal assistance; Increased time to complete  LE Dressing: Maximum assistance; Increased time to complete  Toileting: Moderate assistance; Increased time to complete  Additional Comments: Pt did not complete ADL tasks this date d/t significant significant pain and decreased endurance post sx. Pt is currently expected to require significant assistance for ADL tasks. Tone RUE  RUE Tone: Normotonic  Tone LUE  LUE Tone: Normotonic  Coordination  Movements Are Fluid And Coordinated: Yes     Bed mobility  Rolling to Left: Moderate assistance  Supine to Sit: Moderate assistance(Pt required Mod A for BLE progression and UE support.)  Sit to Supine: (Pt retired to chair upon exit.)  Scooting: Moderate assistance(Pt attempted to scoot EOB, requiring Mod A.)  Comment: HOB elevated and use of bed rail. Pt required increased time/effort for all aspects of bed mobility this date. Transfers  Stand Pivot Transfers: Moderate assistance  Sit to stand: Maximum assistance  Transfer Comments: Increased time/effort for all functional transfers this date d/t pt experiencing significant pain. No assistive device used.      Cognition  Overall Cognitive Status: WFL        Sensation  Overall Sensation Status: WNL        LUE AROM (degrees)  LUE AROM : WFL  Left Hand AROM (degrees)  Left Hand AROM: WFL  RUE AROM (degrees)  RUE AROM : WFL  Right Hand AROM (degrees)  Right Hand AROM: WFL  LUE Strength  L Hand General: 5/5  LUE Strength Comment: Grossly 5/5  RUE Strength  R Hand General: 5/5  RUE Strength Comment: Grossly 5/5        Plan   Plan  Times per week: 4-5x/week  Current Treatment Recommendations: Safety Education & Training, Patient/Caregiver Education & Training, Self-Care / ADL, Functional Mobility Training, Equipment Evaluation, Education, & procurement, Endurance Training      AM-PAC Score        AM-PAC Inpatient Daily Activity Raw Score: 14 (09/04/20 1038)  AM-PAC Inpatient ADL T-Scale Score : 33.39 (09/04/20 1038)  ADL Inpatient CMS 0-100% Score: 59.67 (09/04/20 1038)  ADL Inpatient CMS G-Code Modifier : CK (09/04/20 1038)    Goals  Short term goals  Time Frame for Short term goals: By discharge:  Short term goal 1: Pt will complete bed mobility Independently. Short term goal 2: Pt will complete functional trasnfers/functional mobility Independently. Short term goal 3: Pt will demo UB ADLs with Mod I, use of AD/AE PRN. Short term goal 4: Pt will demo LB ADLs with Min A, use of AD/AE PRN.   Short term goal 5: Pt will demo ~15 minutes of standing tolerance during functional tasks to increase participation and independence in ADL/IADL task s       Therapy Time   Individual Concurrent Group Co-treatment   Time In 0824         Time Out 0848         Minutes 24         Timed Code Treatment Minutes: 9 Minutes       Juan Schaefer, OTR/L

## 2020-09-04 NOTE — PROGRESS NOTES
Spoke with Dr Jennyfer Cramer on the phone and gave him an update. Inquired about giving another unit of blood after the first and he stated that he wanted it given unless the pt's Hgb came back at 9.0.

## 2020-09-04 NOTE — FLOWSHEET NOTE
09/04/20 1422   Provider Notification   Reason for Communication Review case   Provider Name Reina Franco   Provider Notification Resident   Method of Communication Secure Message  (Perfect serve)   Response Other (Comment)  (satified with explaination)   Notification Time 1430     Perfect served urology to discuss phone call with Andres Leal RN about Hgb. Conveyed the discussion with critical care (/Primary). Rubia Sampson didn't see a need for another transfusion if the Hgb came back below 9. Protocol is to transfuse if Hgb comes back below 7.     Electronically signed by Brittany Contreras RN on 9/4/2020 at 2:35 PM

## 2020-09-04 NOTE — PROGRESS NOTES
Overnight patient remained tachycardic in 120-130. Complained of uncontrolled pain postoperatively. Surgery evaluation this morning-heart rate still in 120s. Change IV Lopressor 5 to every 4. Pain control-Tylenol every 8, Neurontin 300 every 8, Robaxin 750 every 6. Dilaudid 0.5 every 3 as needed Androxy 5 every 4 as needed  NG to LIS WS  N.p.o. for now. General surgery okay to start TPN. Ordered dietary consult. PICC line in place. DC insulin drip.  med-dose correction scale and Lantus 10 nightly. Adjust as needed. 2 unit PRBC ordered by urology resident  Minimal bowel sounds audible. Moving air per patient. No bowel movement yet. Maintain Bryant's catheter.   Urine output good      Janette Rebolledo MD      Department of Internal Medicine  [facility]         9/4/2020, 7:40 AM

## 2020-09-04 NOTE — PROGRESS NOTES
Physical Therapy    Facility/Department: [address] SICU  Initial Assessment    NAME: Kalman Osgood II  : 1960  MRN: [id number]    Date of Service: 2020    Discharge Recommendations:    Further therapy recommended at discharge. PT Equipment Recommendations  Equipment Needed: (Continue to assess pending progression of mobility- pt would be unsafe to perform functional mobility unassisted at this time.)    Assessment   Body structures, Functions, Activity limitations: Decreased functional mobility ; Decreased posture;Decreased endurance;Decreased strength;Decreased balance; Increased pain  Assessment: Pt required mod-maxA to perform all mobility, modA to perform stand pivot to bedside chair. Pt is highly limited due to pain and is a high fall risk at this time. Recommending continued skilled physical therapy to address balance and functional mobility deficits and return pt to prior level of independence. Prognosis: Fair  Decision Making: Medium Complexity  PT Education: Goals;PT Role;Plan of Care  REQUIRES PT FOLLOW UP: Yes  Activity Tolerance  Activity Tolerance: Patient limited by pain       Patient Diagnosis(es): There were no encounter diagnoses. has a past medical history of Back pain, Cellulitis, Diabetes mellitus ([address] Gout, History of kidney cancer, and Hypertension. has a past surgical history that includes Ankle surgery; knee surgery (Bilateral); Carpal tunnel release (Bilateral); Cystoscopy (Right, 2020); total nephrectomy (Left, 2020); Abdominal exploration surgery (2020); hc cath power picc triple (9/3/2020); Kidney surgery (Left, 2020); and Small intestine surgery (N/A, 2020).     Restrictions  Restrictions/Precautions  Restrictions/Precautions: General Precautions(Up with assist)  Required Braces or Orthoses?: No  Position Activity Restriction  Other position/activity restrictions: up with assistance; G tube to gravity  Vision/Hearing  Vision: Impaired  Vision WFL  ROM RUE: WFL  ROM LUE: WFL  Strength RLE  Strength RLE: Exception  Comment: Grossly 4/5 at hip, knee, and ankle  Strength LLE  Strength LLE: Exception  Comment: Grossly 4/5 at hip, knee, and ankle  Strength RUE  Strength RUE: WFL  Strength LUE  Strength LUE: WFL     Sensation  Overall Sensation Status: WNL(pt denies numbness/tingling)  Bed mobility  Rolling to Left: Moderate assistance  Supine to Sit: Moderate assistance(Pt required Mod A for BLE progression and UE support.)  Sit to Supine: (Pt retired to chair upon exit.)  Scooting: Moderate assistance(Pt attempted to scoot EOB, requiring Mod A.)  Comment: HOB elevated with use of bed rails  Transfers  Sit to Stand: Maximum Assistance  Stand to sit: 2 Person Assistance  Stand Pivot Transfers: Moderate Assistance  Comment: Pt demonstrates significant difficulty obtaining full standing position due to abdominal pain. Stand pivot performed to chair with upper extremity support on writer due to pain. Pt tachycardia at 138bpm following mobility. RN present and aware.   Ambulation  Ambulation?: No  Stairs/Curb  Stairs?: No     Balance  Posture: Fair  Sitting - Static: Fair;+  Sitting - Dynamic: Fair;+  Standing - Static: Fair;-  Standing - Dynamic: Poor;+  Comments: Standing balance assessed w/ bilateral HHA; Pt able to sit EOB Terrance        Plan   Plan  Times per week: 5-6x/week  Current Treatment Recommendations: Strengthening, Transfer Training, Endurance Training, Patient/Caregiver Education & Training, ROM, Balance Training, Gait Training, Home Exercise Program, Functional Mobility Training, Stair training, Safety Education & Training  Safety Devices  Type of devices: Call light within reach, Gait belt, Nurse notified, Chair alarm in place, Patient at risk for falls, Left in chair  Restraints  Initially in place: No    AM-PAC Score     AM-PAC Inpatient Mobility without Stair Climbing Raw Score : 10 (09/04/20 8949)  AM-PAC Inpatient without Stair Climbing T-Scale

## 2020-09-04 NOTE — PROGRESS NOTES
Physician Progress Note      PATIENT:               Michele Wagner  CSN #:                  [id number]  :                       1960  ADMIT DATE:       2020 11:04 PM  [address] [place] DATE:  RESPONDING  PROVIDER #:        Ember Joshua MD          QUERY TEXT:    Pt admitted with SBO secondary to renal mass & sepsis secondary to UTI, and   has anemia documented. If possible, please document in progress notes and   discharge summary further specificity regarding the acuity and type of anemia:    The medical record reflects the following:  Risk Factors: CKD, s/p  nephrectomy and exploratory laparotomy with bowel   resection of proximal jejunum and descending colon  Clinical Indicators: Tachycardia, Hgb 11.3 on admission down to 7.6  Treatment: Labs/monitoring, ICU, transfuse PRBCs for Hgb <7.0    Thank-you,  Hu Maza RN, CDS  Please Epic Inbox/Perfect Serve for questions. Options provided:  -- Anemia due to acute blood loss and CKD  -- Anemia due to acute blood loss  -- Anemia due to postoperative blood loss  -- Anemia due to chronic disease  -- Dilutional anemia  -- Other - I will add my own diagnosis  -- Disagree - Not applicable / Not valid  -- Disagree - Clinically unable to determine / Unknown  -- Refer to Clinical Documentation Reviewer    PROVIDER RESPONSE TEXT:    This patient has anemia due to postoperative blood loss.     Query created by: Mireille Rodriguez on 2020 7:14 AM      Electronically signed by:  Ember Joshua MD 2020 8:17 AM

## 2020-09-04 NOTE — PROGRESS NOTES
Comprehensive Nutrition Assessment    Type and Reason for Visit:  Reassess    Nutrition Recommendations/Plan: Recommend 200g Dextrose, 50 g AA at 42 ml per hour + 250 ml 20% IL to provide 1380 kcal 50 g protein for day 1 of PN. Nutrition Assessment:  TPN to start today via PICC. Pt is presently up in chair.     Malnutrition Assessment:  Malnutrition Status:  Severe malnutrition    Context:  Chronic Illness     Findings of the 6 clinical characteristics of malnutrition:  Energy Intake:  7 - 75% or less estimated energy requirements for 1 month or longer  Weight Loss:  7 - Greater than 10% over 6 months     Body Fat Loss:  No significant body fat loss     Muscle Mass Loss:  No significant muscle mass loss    Fluid Accumulation:  No significant fluid accumulation     Strength:  Not Performed    Estimated Daily Nutrient Needs:  Energy (kcal):  8306-1166 kcal/day; Weight Used for Energy Requirements:  Ideal     Protein (g):  95 g pro/day; Weight Used for Protein Requirements:  Ideal(1.2)          Nutrition Related Findings:  Soft round abd, hypo BS, Glu 121-169- medium dose sliding scale, lantus, no BM yet      Wounds:  Surgical Wound       Current Nutrition Therapies:    Diet NPO Effective Now Exceptions are: Ice Chips, Sips with Meds  PN-Adult 2-in-1 Central Line (Standard)    Anthropometric Measures:  · Height: 5' 11\" (180.3 cm)  · Current Body Weight: 257 lb 0.9 oz (116.6 kg)   · Usual Body Weight: 294 lb (133.4 kg)(3/16/20 per EHR)     · Ideal Body Weight: 172 lbs; BMI: 35.9  · BMI Categories: Obese Class 2 (BMI 35.0 -39.9)       Nutrition Diagnosis:   · Inadequate oral intake related to altered GI function as evidenced by nutrition support - parenteral nutrition    Nutrition Interventions:   Food and/or Nutrient Delivery:  Start Parenteral Nutrititon  Nutrition Education/Counseling:  Education completed(discussed PN with pt's wife)   Coordination of Nutrition Care:  Continued Inpatient Monitoring    Goals:  meet % of estimated nutrition needs       Nutrition Monitoring and Evaluation:   Behavioral-Environmental Outcomes:      Food/Nutrient Intake Outcomes:  Parenteral Nutrition Intake/Tolerance  Physical Signs/Symptoms Outcomes:  Biochemical Data, Skin, GI Status     Discharge Planning:     Too soon to determine     Electronically signed by Marii Gonzalez RD, LD on 9/4/20 at 1:07 PM EDT    Contact: [phone]

## 2020-09-04 NOTE — PLAN OF CARE
Problem: Skin Integrity:  Goal: Will show no infection signs and symptoms  Description: Will show no infection signs and symptoms  Outcome: Ongoing  Goal: Absence of new skin breakdown  Description: Absence of new skin breakdown  Outcome: Ongoing     Problem: Falls - Risk of:  Goal: Will remain free from falls  Description: Will remain free from falls  Outcome: Ongoing  Goal: Absence of physical injury  Description: Absence of physical injury  Outcome: Ongoing     Problem: Discharge Planning:  Goal: Discharged to appropriate level of care  Description: Discharged to appropriate level of care  Outcome: Ongoing     Problem: Infection, Septic Shock:  Goal: Will show no infection signs and symptoms  Description: Will show no infection signs and symptoms  Outcome: Ongoing     Problem: Serum Glucose Level - Abnormal:  Goal: Ability to maintain appropriate glucose levels will improve  Description: Ability to maintain appropriate glucose levels will improve  Outcome: Ongoing     Problem: Nutrition  Goal: Optimal nutrition therapy  Description: Nutrition Problem #1: Inadequate oral intake  Intervention: Food and/or Nutrient Delivery: Continue NPO. Start nutrition as able; Monitor plans re:PICC placement/ initiation of parenteral nutrition support.   Nutritional Goals: meet % of estimated nutrition needs     Outcome: Ongoing     Problem: Pain:  Goal: Pain level will decrease  Description: Pain level will decrease  Outcome: Ongoing  Goal: Control of acute pain  Description: Control of acute pain  Outcome: Ongoing  Goal: Control of chronic pain  Description: Control of chronic pain  Outcome: Ongoing

## 2020-09-04 NOTE — PROGRESS NOTES
Urology Progress Note  CC:  Left renal mass  Subjective:   Miladys Soria is a 56 y.o. male. His/Her current Diet is: Diet NPO Effective Now Exceptions are: Ice Chips, Sips with Meds. The patient is 2 Days Post-Op from Procedure(s):  LAPAROSCOPIC XI ROBOTIC NEPHRECTOMY  EXPLORATORY LAPAROTOMY, RESECTION OF PROXIMAL JEJUNUM AND DESCENDING COLON WITH MOBILIZATION OF SPLENIC FLEXURE AND PRIMARY ANASTAMOSISOF SMALL BOWEL AND COLON, PLACEMENT OF GASTROSTOMY TUBE  No acute urologic events overnight.  Extubated yesterday, off pressors  No chest pain, shortness of breath, nausea, vomiting, fevers, chills  is not ambulating  is not passing flatus  is not having bowel movements  Pain intermittently controlled with fentanyl  On insulin drip  UOP 1850 cc in 24 hr  Gastrostomy tube with 235 cc brown fluid in 24 hr    Patient Vitals for the past 24 hrs:   BP Temp Temp src Pulse Resp SpO2   09/04/20 0500 -- -- -- 125 16 99 %   09/04/20 0400 -- 98.4 °F (36.9 °C) Oral 115 17 100 %   09/04/20 0300 -- -- -- 118 19 100 %   09/04/20 0200 -- -- -- 114 13 100 %   09/04/20 0100 -- -- -- 116 18 100 %   09/04/20 0000 -- 98.6 °F (37 °C) Oral 119 15 100 %   09/03/20 2300 -- -- -- 116 16 100 %   09/03/20 2200 -- -- -- 107 14 100 %   09/03/20 2100 -- -- -- 120 16 100 %   09/03/20 2006 -- -- -- -- 16 100 %   09/03/20 2000 -- 99 °F (37.2 °C) Oral 118 16 100 %   09/03/20 1900 -- -- -- 114 15 100 %   09/03/20 1815 -- -- -- 104 13 100 %   09/03/20 1800 -- -- -- 123 20 100 %   09/03/20 1745 -- -- -- 121 21 100 %   09/03/20 1730 -- -- -- 121 17 100 %   09/03/20 1715 -- -- -- 123 16 100 %   09/03/20 1700 -- -- -- 121 12 100 %   09/03/20 1645 -- -- -- 124 17 100 %   09/03/20 1630 -- -- -- 121 10 100 %   09/03/20 1615 -- -- -- 121 13 99 %   09/03/20 1600 -- -- -- 123 (!) 5 100 %   09/03/20 1545 -- -- -- 120 12 100 %   09/03/20 1530 -- -- -- 121 16 100 %   09/03/20 1515 -- -- -- 123 13 100 %   09/03/20 1500 -- -- -- 122 17 99 %   09/03/20 1445 -- -- -- 121 10 99 %   09/03/20 1430 -- -- -- 120 20 100 %   09/03/20 1415 -- -- -- 119 (!) 5 99 %   09/03/20 1345 -- -- -- 111 15 100 %   09/03/20 1315 -- -- -- 134 12 99 %   09/03/20 1245 -- -- -- 129 8 99 %   09/03/20 1230 -- -- -- 128 18 100 %   09/03/20 1216 131/69 98.1 °F (36.7 °C) Oral 128 15 100 %   09/03/20 1200 -- 98.1 °F (36.7 °C) -- 130 (!) 0 99 %   09/03/20 1145 -- -- -- 126 (!) 0 99 %   09/03/20 1130 -- -- -- 124 11 100 %   09/03/20 1115 -- -- -- 122 16 98 %   09/03/20 1114 -- -- -- -- 15 99 %   09/03/20 1100 -- -- -- 120 11 100 %   09/03/20 1045 -- -- -- 123 (!) 0 99 %   09/03/20 1030 -- -- -- 124 (!) 0 99 %   09/03/20 1015 -- -- -- 120 (!) 0 99 %   09/03/20 1000 -- -- -- 118 12 100 %   09/03/20 0945 -- -- -- 115 8 100 %   09/03/20 0930 -- -- -- 115 (!) 6 100 %   09/03/20 0915 -- -- -- 110 11 100 %   09/03/20 0900 (!) 117/100 -- -- 129 (!) 7 100 %   09/03/20 0845 -- -- -- 129 10 100 %   09/03/20 0830 -- -- -- 133 9 99 %   09/03/20 0817 -- -- -- 134 20 100 %   09/03/20 0815 -- -- -- 134 21 100 %   09/03/20 0804 -- -- -- 127 (!) 7 100 %   09/03/20 0800 121/79 99.3 °F (37.4 °C) Oral 127 (!) 6 100 %   09/03/20 0700 112/75 -- -- 124 16 100 %       Intake/Output Summary (Last 24 hours) at 9/4/2020 0628  Last data filed at 9/4/2020 0400  Gross per 24 hour   Intake 1900.47 ml   Output 2085 ml   Net -184.53 ml       Recent Labs     09/03/20  0552 09/03/20  1523 09/04/20  0527   WBC 17.9* 15.7* 16.1*   HGB 8.5* 8.1* 7.6*   HCT 28.1* 26.7* 25.7*   MCV 90.1 90.2 90.8    329 316     Recent Labs     09/01/20  1659 09/01/20  2039  09/03/20  0552 09/03/20  1324 09/04/20  0527    140   < > 138 139 142   K 3.9 4.0   < > 4.5 4.1 4.2   CL 95* 96*   < > 104 107 110*   CO2 31 27   < > 23 22 21   PHOS 4.2 3.6  --   --   --  2.3*   BUN 34* 34*   < > 32* 30* 28*   CREATININE 1.66* 1.72*   < > 1.83* 1.84* 1.80*    < > = values in this interval not displayed.        Recent Labs     09/02/20  0207   COLORU RED*   PHUR 8.5* [facility] TO COUNT   RBCUA TOO NUMEROUS TO COUNT   MUCUS NOT REPORTED   TRICHOMONAS NOT REPORTED   YEAST NOT REPORTED   BACTERIA NOT REPORTED   SPECGRAV 1.019   LEUKOCYTESUR LARGE*   UROBILINOGEN Normal   BILIRUBINUR NEGATIVE       Current Facility-Administered Medications   Medication Dose Route Frequency Provider Last Rate Last Dose    HYDROmorphone (DILAUDID) injection 0.5 mg  0.5 mg Intravenous Q3H PRN Ivory Area, DO   0.5 mg at 09/04/20 5841    acetaminophen (TYLENOL) tablet 1,000 mg  1,000 mg Oral Q8H Ivory Area, DO        methocarbamol (ROBAXIN) tablet 750 mg  750 mg Oral Q6H Ivory Area, DO        lidocaine 4 % external patch 2 patch  2 patch Transdermal Daily Ivory Area, DO        gabapentin (NEURONTIN) capsule 300 mg  300 mg Oral Q8H Ivory Area, DO        oxyCODONE (ROXICODONE) immediate release tablet 5 mg  5 mg Oral Q4H PRN Ivory Area, DO        bisacodyl (DULCOLAX) suppository 10 mg  10 mg Rectal Daily [place] Mila Doty, DO   10 mg at 09/03/20 1335    metoclopramide (REGLAN) injection 10 mg  10 mg Intravenous Q6H Jagjit Doty, DO   10 mg at 09/04/20 3451    insulin regular (HUMULIN R;NOVOLIN R) 100 Units in sodium chloride 0.9 % 100 mL infusion  0.5 Units/hr Intravenous Continuous Matthew Hyatt MD 0.9 mL/hr at 09/04/20 0514 0.9 Units/hr at 09/04/20 0514    metoprolol (LOPRESSOR) injection 5 mg  5 mg Intravenous Q6H PRN Matthew Hyatt MD   5 mg at 09/03/20 2154    famotidine (PEPCID) injection 20 mg  20 mg Intravenous Daily Matthew Hyatt MD        cefepime (MAXIPIME) 1 g IVPB minibag  1 g Intravenous Q12H Bernadette Gavin MD   Stopped at 09/04/20 0355    metoprolol (LOPRESSOR) injection 5 mg  5 mg Intravenous Q6H Matthew Hyatt MD   5 mg at 09/04/20 0625    glucose (GLUTOSE) 40 % oral gel 15 g  15 g Oral PRN Evelyne Crystal MD        dextrose 50 % IV solution  12.5 g Intravenous PRN Evelyne Crystal MD        glucagon (rDNA) injection 1 mg  1 mg Intramuscular PRN Ria Hinton MD        dextrose 5 % solution  100 mL/hr Intravenous PRN Ria Hinton MD        sodium chloride flush 0.9 % injection 10 mL  10 mL Intravenous 2 times per day Ria Hinton MD   10 mL at 09/03/20 2032    sodium chloride flush 0.9 % injection 10 mL  10 mL Intravenous PRN Ria Hinton MD        sodium chloride flush 0.9 % injection 10 mL  10 mL Intravenous 2 times per day Ria Hinton MD   10 mL at 09/03/20 2032    sodium chloride flush 0.9 % injection 10 mL  10 mL Intravenous PRN Ria Hitnon MD        heparin (porcine) injection 5,000 Units  5,000 Units Subcutaneous 3 times per day Ria Hinton MD   5,000 Units at 09/03/20 2149    metronidazole (FLAGYL) 500 mg in NaCl 100 mL IVPB premix  500 mg Intravenous Marshall Rojas MD   Stopped at 09/04/20 0321    fentaNYL (SUBLIMAZE) injection 100 mcg  100 mcg Intravenous Q1H PRN Delgado Johnston MD   100 mcg at 09/04/20 0520    0.9 % sodium chloride infusion   Intravenous Continuous Dilestradaor MD Olena 125 mL/hr at 09/03/20 2036      sodium chloride flush 0.9 % injection 10 mL  10 mL Intravenous 2 times per day Ria Hinton MD   10 mL at 09/03/20 2032    sodium chloride flush 0.9 % injection 10 mL  10 mL Intravenous PRN Ria Hinton MD        promethazine (PHENERGAN) tablet 12.5 mg  12.5 mg Oral Q6H PRN Ria Hinton MD        Or    ondansetron (ZOFRAN) injection 4 mg  4 mg Intravenous Q6H PRN iRa Hinton MD                Additional Lab/culture results:  BCx ng at 2 days,x2  UCx no growth from 9/2  UCx no growth from 9/1    Physical Exam:   NAD  AOx3  Peripheral pulses palpable  Tachycardic  Respirations nonlabored, symmetric chest rise bilaterally  Soft, appropriately tender, midline incisions with gaps between staples, no expressible fluid.  Other incisions c/d/i  G tube with bilious fluid from stomach  No calf ttp bilaterally  EPC cuffs on and functioning billaterally      Interval Imaging Findings:    Impression:    POD 2 from  Laparoscopic robotic nephrectomy and exploratory laparotomy with bowel resection of proximal jejunum and descending colon  - Acute blood loss anemia   - small bowel obstruction likely secondary to mesenteric invasion of tumor  - acute kidney injury on CKD stage IIIA (baseline Cr 1.4)  - right distal ureteral stone s/p cystoscopy and right ureteral stent placement by  [facility] [address]  - platelet dysfunction on aspirin 81 mg last dose 8/28/20  - comorbid conditions: diabetes mellitus type 2, HTN, obesity and chronic kidney disease.     Plan:   Diet: N.p.o. Madyson Sanabria for starting G-tube feeding per general surgery, possible clamp and ice chips today  IV fluids : at 150 ml/  Hour/per primary team  Antibiotics: On cefepime  and Flagyl  Hep 5000 U q8 hr per primary  Pain control: IV morphine for pain control  Ambulation / IS 10 times per hour   AM labs: Hemoglobin 7.6 from 8.1, will discuss with Dr Kenzie Alberto, transfuse 2 U pRBC with lasix 20 mg in between.   Ambulate and incentive spirometer  Maintain indwelling Bryant catheter for now, possible removal when ambulating and when out of ICU      Diego Mcpherson MD  Urology Resident, PGY-4  6:28 AM 9/4/2020

## 2020-09-04 NOTE — PROGRESS NOTES
Critical care team - Resident sign-out to medicine service      Date and time: 9/4/2020 3:50 PM  Patient's name:  Omid Aldridge Record Number: [id number]  Patient's account/billing number: [de-identified]  Patient's YOB: 1960  Age: 56 y.o. Date of Admission: 9/1/2020 11:04 PM  Length of stay during current admission: 3    Primary Care Physician: Jerad Castro MD    Code Status: Full Code    Mode of physician to physician communication:        [x] Via telephone   [] In person     Date and time of sign-out: 9/4/2020 3:50 PM    Accepting Internal Medicine resident: Dr. Kayce Hendrickson     Accepting Medicine team: IM Team 3    Accepting team's attending: Dr. Delilah Alicea    Patient's current ICU Bed: 107    Patient's assigned bed on floor:  401        [] Med-Surg Monitored [x] Step-down       [] Psychiatry ICU       [] Psych floor     Reason for ICU admission:      SBO 2/2 mass effect from malignant left renal mass    ICU course summary:   Kevin Curtis II is a 56 y.o. with PMH of T2DM, hypertension, morbid obesity, chronic kidney disease (baseline 1.3-1.7). Patient originally [facility] on 08/[facility] with symptoms of abdominal distention, abdominal pain, nausea emesis and inability to tolerate p.o. diet found to be severely dehydrated with acute kidney injury on CKD, elevated blood glucose in 400, leukocytosis 20,000 and tachycardic. CT abdomen pelvis was completed which demonstrated left renal mass 4.8 x 6.9 x 9.9 cm eroding into and causing small bowel obstruction. On the CT abdomen patient was also noted to have a right ureteral stone 5 x 11 mm. Patient was admitted to the ICU and general surgery was consulted along with [facility] at [facility]. Patient was decompressed with NG tube, placed on IV fluids and insulin drip. Pt was also started on cefepime and flagyl for empiric sepsis coverage.  [facility] patient received cystoscopy and right ureteral stent     During ICU stay patient underwent laparoscopic robotic nephrectomy and   Exploratory laparotomy bowel resection of proximal jejunum and descending colon with mobilization of splenic flexure and primary anastomosis of small bowel and colon along with placement of gastrostomy tube. Postoperatively patient remained stable except sinus tachycardia and uncontrolled pain.       Current evaluation:  VS-stable except tachycardia heart rate in 110s, normal sinus rhythm. Afebrile. Minimal bowel sounds heard on evaluation. Per patient he is moving air. No bowel movement yet. Urine output good 2l L in 24 hours. NG tube output-310 brown fluid. general surgery and urology following the patient.    NG to LIWS  G-tube in place. General surgery okay to start TPN today. Bryant in place.   Encouraged incentive spirometry and ambulation    Procedures during patient's ICU stay:     Current Vitals:     BP (!) 111/97   Pulse 159   Temp 98.4 °F (36.9 °C) (Oral)   Resp 19   Ht 5' 11\" (1.803 m)   Wt 257 lb 8 oz (116.8 kg)   SpO2 100%   BMI 35.91 kg/m²       Cultures:     Blood cultures:                 [] None drawn      [x] Negative             []  Positive (Details:  )  Urine Culture:                   [] None drawn      [x] Negative             []  Positive (    Consults:     gen surg , urology    Assessment:     Patient Active Problem List    Diagnosis Date Noted    Small bowel obstruction ([address] 09/01/2020    Bowel obstruction ([address] 08/31/2020    DEBBI (acute kidney injury) ([address] 10/05/2017    Essential hypertension 10/04/2017    Morbid obesity due to excess calories ([address] 10/04/2017    Chronic kidney disease (CKD) 10/04/2017    Type 2 diabetes mellitus without complication ([address] 49/67/1152    Cellulitis 07/14/2015       Additional assessment:    Recommended Follow-up:     Proximal small bowel obstruction secondary infiltrative lower pole left renal mass s/p laparoscopic robotic nephrectomy and Exploratory laparotomy bowel resection of proximal jejunum and descending colon with mobilization of splenic flexure and primary anastomosis of small bowel and colon along with placement of gastrostomy tube. POD-2- extubated yest(9/3)  Postoperatively hemodynamically stable except sinus tachycardia. Inadequate pain control-pain regimen enhanced per general surgery. General surgery and urology recommendations noted. Pain control-Tylenol every 8, Neurontin 300 every 8, Robaxin 750 every 6.  Dilaudid 0.5 every 3 as needed And norm 5 every 4 as needed.     NG to JOAN  N.p.o. for now. General surgery okay to start TPN.  Ordered dietary consult. PICC line in place. Minimal bowel sounds audible.  Moving air per patient.    No bowel movement yet. Maintain Bryant's catheter.  Urine output 2l/24hrs     -Sinus tachycardia likely related to dehydration and blood loss. IV Lopressor 5 every 4. F/u EKG. IV hydration. Maintain electrolytes potassium more than 4 and mag more than 2. [id number] for 1 unit PRBC ordered per urology.       -Acute anemia blood loss due to perioperative blood loss. 250 ml documented blood loss. Daily CBC. Follow-up iron studies. Peripheral blood smear. 2 unit PRBC ordered per urology. Okay to administer 1 unit PRBC.     -Hyperglycemia-blood glucose in 130s. DC insulin drip. Med-dose correction scale and Lantus 10 nightly. Hypoglycemia protocol. Monitor blood glucose after starting of TPN and adjust as needed. POCT glucose checks every 4 hours.     -Diabetes mellitus type 2 HbA1c 16.4(6/22)-follow-up repeat HbA1c. Recommend Lantus at time of discharge. Follow-up with PCP for antidiabetic medication management.       -Sepsis secondary to UTI  Blood cultures and urine culture showed no growth till date. Descalate IV cefepime and Flagyl. Temperature check and follow-up CBC daily.     -Right renal calculi s/p  cystoscopy and right ureteral stent placement 09/01 at [facility]. Bryant is in place. Urology recommendations noted.     -DEBBI on CKD stage II (baseline Cr 1.3-1.7)  DEBBI likely prerenal nonoliguric type secondary to hypovolemia from perioperative blood loss from dehydration. Postoperatively creatinine at 1.8. Continued on IV hydration. Daily BMP. Monitor intake output.     - Essential hypertension  -Patient is on Lopressor 50 mg twice daily, lisinopril 20 mg daily, hydralazine 25 mg 3 times daily at home. Resume as needed.               Above mentioned assessment and plan was discussed by me with the admitting medicine resident. The medicine team assigned to the patient by medicine admitting resident will be following up the patient from now onwards on the floor. Thi Lugo M.D.   Internal Medicine PGY3  9/4/2020, 3:50 PM

## 2020-09-04 NOTE — PROGRESS NOTES
Critical Care Team - Daily Progress Note      Date and time: 2020 8:59 AM  Patient's name:  Letty Brewer II  Medical Record Number: [id number]  Patient's account/billing number: [de-identified]  Patient's YOB: 1960  Age: 56 y.o. Date of Admission: 2020 11:04 PM  Length of stay during current admission: 3      Primary Care Physician: Maddy Her MD  ICU Attending Physician: Dr. Ayo Goodson Status: Full Code    Reason for ICU admission: Small bowel obstruction secondary to infiltrating left renal mass      SUBJECTIVE:      Patient got extubated yesterday afternoon. Post extubation no significant events. Overnight patient remained tachycardic in 120s. Complained of uncontrolled pain postoperatively. No other significant events overnight. Current evaluation:  VS-stable except tachycardia heart rate in 110s, normal sinus rhythm. Afebrile. Patient comfortably lying in bed and denies any nausea vomiting. Complains of abdominal pain 4/10 and intensity. Minimal bowel sounds heard on evaluation. Per patient he is moving air. No bowel movement yet. Urine output good 2l L in 24 hours. NG tube output-310 brown fluid    Other pertinent labs showed creatinine stable at 1.80, blood sugar in 150s  WBC 16.1, hemoglobin dropped to 7.6     Evaluated by general surgery and urology this morning. Enhanced pain control per general surgery. 2 unit PRBC ordered by urology for hemoglobin drop. NG to LIWS  G-tube in place. General surgery okay to start TPN. General surgery placed orders. Bryant in place. Encouraged incentive spirometry and ambulation.     OBJECTIVE:     VITAL SIGNS:  /85   Pulse 141   Temp 100.4 °F (38 °C) (Oral)   Resp 18   Ht 5' 11\" (1.803 m)   Wt 257 lb 8 oz (116.8 kg)   SpO2 100%   BMI 35.91 kg/m²   Tmax over 24 hours:  Temp (24hrs), Av.7 °F (37.1 °C), Min:98.1 °F (36.7 °C), Max:100.4 °F (38 °C)      Patient Vitals for the past 8 hrs:   BP Temp Temp src Pulse Resp SpO2   09/04/20 0855 130/85 -- -- 141 18 100 %   09/04/20 0851 (!) 125/91 100.4 °F (38 °C) Oral 140 18 100 %   09/04/20 0847 119/84 98.4 °F (36.9 °C) Oral 138 18 100 %   09/04/20 0842 (!) 139/94 98.4 °F (36.9 °C) Oral 138 18 100 %   09/04/20 0700 122/84 -- -- 114 14 100 %   09/04/20 0600 -- -- -- 117 15 100 %   09/04/20 0500 -- -- -- 125 16 99 %   09/04/20 0400 -- 98.4 °F (36.9 °C) Oral 115 17 100 %   09/04/20 0300 -- -- -- 118 19 100 %   09/04/20 0200 -- -- -- 114 13 100 %   09/04/20 0100 -- -- -- 116 18 100 %         Intake/Output Summary (Last 24 hours) at 9/4/2020 0859  Last data filed at 9/4/2020 0600  Gross per 24 hour   Intake 1627.67 ml   Output 1920 ml   Net -292.33 ml     Date 09/04/20 0000 - 09/04/20 2359   Shift 8050-2750 2980-1907 3285-6191 24 Hour Total   INTAKE   I.V.(mL/kg) 937. 3(8)   937. 3(8)   Shift Total(mL/kg) 937. 3(8)   937. 3(8)   OUTPUT   Urine(mL/kg/hr) 500(0.5)   500   Emesis/NG output(mL/kg) 295(2.5)   295(2.5)   Shift Total(mL/kg) 795(6.8)   795(6.8)   Weight (kg) 116.8 116.8 116.8 116.8     Wt Readings from Last 3 Encounters:   09/03/20 257 lb 8 oz (116.8 kg)   08/31/20 255 lb (115.7 kg)   08/10/20 263 lb (119.3 kg)     Body mass index is 35.91 kg/m². PHYSICAL EXAM:  Constitutional: Looks tired otherwise comfortably lying in bed. Neck: Supple, trachea midline  Respiratory: Minimal bilateral rales(atelectasis)  Cardiovascular: Tachycardia, sinus rhythm  Abdomen: soft, minimal audible bowel sounds,   dressing in place covering G-tube site. NEUROLOGIC-AAOx3, strength and sensations grossly normal.  Encourage ambulation.   Extremities:peripheral pulses normal,1+ pedal edema  SKIN: normal coloration and turgor    Any additional physical findings:      MEDICATIONS:  Scheduled Meds:   acetaminophen  1,000 mg Oral Q8H    methocarbamol  750 mg Oral Q6H    lidocaine  2 patch Transdermal Daily    gabapentin  300 mg Oral Q8H    sodium chloride  20 mL Intravenous Once    furosemide [] None drawn      [x] Negative             []  Positive (      Chest Xray (9/4/2020): Impression    Line placement as above         Expiratory exam with interval increase in now mild streaky bibasilar    atelectasis         ASSESSMENT:   Active Problems:    Small bowel obstruction (HCC)  Resolved Problems:    * No resolved hospital problems. *      PLAN:     -Proximal small bowel obstruction secondary infiltrative lower pole left renal mass s/p laparoscopic robotic nephrectomy and Exploratory laparotomy bowel resection of proximal jejunum and descending colon with mobilization of splenic flexure and primary anastomosis of small bowel and colon along with placement of gastrostomy tube. POD-2- extubated yest(9/3)  Postoperatively hemodynamically stable except sinus tachycardia. Inadequate pain control-pain regimen enhanced per general surgery. General surgery and urology recommendations noted. Pain control-Tylenol every 8, Neurontin 300 every 8, Robaxin 750 every 6. Dilaudid 0.5 every 3 as needed And norm 5 every 4 as needed. NG to LIWS  N.p.o. for now. General surgery okay to start TPN. Ordered dietary consult. PICC line in place. Minimal bowel sounds audible. Moving air per patient. No bowel movement yet. Maintain Bryant's catheter. Urine output 2l/24hrs    -Sinus tachycardia likely related to dehydration and blood loss. IV Lopressor 5 every 4. F/u EKG. IV hydration. Maintain electrolytes potassium more than 4 and mag more than 2. Ok for 1 unit PRBC ordered per urology.      -Acute anemia blood loss due to perioperative blood loss. 250 ml documented blood loss. Daily CBC. Follow-up iron studies. Peripheral blood smear. 2 unit PRBC ordered per urology. Okay to administer 1 unit PRBC. -Hyperglycemia-blood glucose in 130s. DC insulin drip. Med-dose correction scale and Lantus 10 nightly. Hypoglycemia protocol. Monitor blood glucose after starting of TPN and adjust as needed.  POCT glucose checks every 4 hours. -Diabetes mellitus type 2 HbA1c 16.4(6/22)-follow-up repeat HbA1c. Recommend Lantus at time of discharge. Follow-up with PCP for antidiabetic medication management. -Sepsis secondary to UTI  Blood cultures and urine culture showed no growth till date. Descalate IV cefepime and Flagyl. Temperature check and follow-up CBC daily.    -Right renal calculi s/p  cystoscopy and right ureteral stent placement 09/01 at [facility]. [facility]. Bryant is in place. Urology recommendations noted.     -DEBBI on CKD stage II (baseline Cr 1.3-1.7)  DEBBI likely prerenal nonoliguric type secondary to hypovolemia from perioperative blood loss from dehydration. Postoperatively creatinine at 1.8. Continued on IV hydration. Daily BMP. Monitor intake output. - Essential hypertension  -Patient is on Lopressor 50 mg twice daily, lisinopril 20 mg daily, hydralazine 25 mg 3 times daily at home. Resume as needed.          Feeding Diet: Diet NPO Effective Now Exceptions are: Ice Chips, Sips with Meds  Fluids-iv fluids 100  Family updated   Analgesic- Tylenol every 8, Neurontin 300 every 8, Robaxin 750 every 6. Dilaudid 0.5 every 3 as needed And norm 5 every 4 as needed. Sedation-none  Thrombo-prophylaxis s/c heparin   Mobility fair   Heads up 30 Degrees  Ulcer prophylaxis I.v pepcid 20 daily  Glycemic control- med dose correction scale and lantus 10 HS  Spontaneous breathing trial-N/A extubated yesterday. Bowel regimen/urine output-no bowel movement yet/U/o-2l/24hrs. Indwelling catheter/lines Bryant is in place, gastrostomy tube. Dp-ojtmlvsusw-cp-escalation of antibiotics. Quiana Espinoza M.D.              Department of Internal Medicine/ Critical care  [facility])             9/4/2020, 8:59 AM

## 2020-09-04 NOTE — PROGRESS NOTES
General Surgery:  Daily Progress Note  POD#2 s/p  R nephrectomy, SB resection    PATIENT NAME: Olga Lidia Bautista II     TODAY'S DATE: 9/4/2020, 4:52 AM    SUBJECTIVE:     Pt seen and examined at bedside. Patient experiencing increased pain 9/10 overnight managed with every hour fentanyl and other MMT. Tachycardia improved from yesterday but still elevated. Pt extubated yesterday, doing well since. Good urine output. NPO, on IVF NS 125mL/hr. Afebrile, T-max 99.3. OBJECTIVE:   VITALS:  /69   Pulse 115   Temp 98.4 °F (36.9 °C) (Oral)   Resp 17   Ht 5' 11\" (1.803 m)   Wt 257 lb 8 oz (116.8 kg)   SpO2 100%   BMI 35.91 kg/m²      INTAKE/OUTPUT:      Intake/Output Summary (Last 24 hours) at 9/4/2020 0452  Last data filed at 9/4/2020 0400  Gross per 24 hour   Intake 1900.47 ml   Output 1830 ml   Net 70.47 ml       PHYSICAL EXAM:  General Appearance: Alert, oriented, fatigued  HEENT:  Normocephalic, atraumatic, mucus membranes moist. NGT in place  Heart: Tachycardic, regular rhythm  Lungs: intubated  Abdomen: Soft, distended, winces with palpation of abdomen. Gastrostomy tube in place with green liquid in collection. Dressings pain, dry, intact  Extremities: No cyanosis, pitting edema, rashes noted. Skin: Skin color, texture, turgor normal. No rashes or lesions.       Data:  CBC:   Recent Labs     09/02/20 0447 09/03/20  0552 09/03/20  1523   WBC 16.8* 17.9* 15.7*   HGB 10.4* 8.5* 8.1*    376 329     Chemistry:   Recent Labs     09/01/20  0455  09/01/20  1223 09/01/20  1440 09/01/20  1659 09/01/20  2039 09/02/20  0020 09/02/20  0447 09/03/20  0552 09/03/20  1324      < > 138  --  140 140  --  140 138 139   K 4.0   < > 4.4  --  3.9 4.0  --  4.2 4.5 4.1   CL 95*   < > 94*  --  95* 96*  --  100 104 107   CO2 28   < > 30  --  31 27  --  24 23 22   GLUCOSE 186*   < > 201*  --  136* 163*  --  256* 313* 217*   BUN 36*   < > 36*  --  34* 34*  --  33* 32* 30*   CREATININE 1.76*   < > 1.83*  -- 1.66* 1.72*  --  1.58* 1.83* 1.84*   MG 1.8   < > 1.9  --  1.8 1.6 1.8  --   --   --    ANIONGAP 14   < > 14  --  14 17  --  16 11 10   LABGLOM 39*   < > 38*  --  42* 40*  --  45* 38* 37*   GFRAA 48*   < > 46*  --  51* 49*  --  54* 46* 45*   CALCIUM 9.9   < > 9.9  --  9.5 9.2  --  8.7 7.8* 8.0*   CAION  --   --   --   --   --   --  1.11*  --   --   --    PHOS 3.7   < > 4.1  --  4.2 3.6  --   --   --   --    TROPHS 17  --   --  14  --  15  --   --   --   --    MYOGLOBIN 694*  --   --  439*  --  291*  --   --   --   --     < > = values in this interval not displayed. Hepatic:   Recent Labs     09/02/20  0020 09/03/20  1324   AST 47* 20   ALT 24 21   ALKPHOS 92 58   BILITOT 0.43 0.89   BILIDIR 0.24  --      Coagulation:   Recent Labs     09/02/20  0020 09/03/20  1324   PROT 7.9 6.0*         Radiology Review:    Xr Abdomen (kub) (single Ap View)    Addendum Date: 9/1/2020    ADDENDUM: Dose area product 1037.1 mGy per cm squared and fluoro time 5.6 seconds     Result Date: 9/1/2020  EXAMINATION: ONE SUPINE XRAY VIEW(S) OF THE ABDOMEN 9/1/2020 9:32 pm COMPARISON: 16 hours ago HISTORY: ORDERING SYSTEM PROVIDED HISTORY: cysto with right stent TECHNOLOGIST PROVIDED HISTORY: cysto with right stent Reason for Exam: cysto Acuity: Acute Type of Exam: Initial FINDINGS: Double-J ureteral stent present. On the contralateral side there appears to be a nephrolith. Please correlate with clinical service     Xr Chest Portable    Result Date: 9/1/2020  EXAMINATION: ONE XRAY VIEW OF THE CHEST 9/1/2020 8:31 pm COMPARISON: 05/14/2010 HISTORY: ORDERING SYSTEM PROVIDED HISTORY: evaluation TECHNOLOGIST PROVIDED HISTORY: evaluation Reason for Exam: evaluation Acuity: Acute Type of Exam: Initial FINDINGS: Cardiomediastinal silhouette is unchanged in size. No pulmonary consolidation, pleural effusion, or pneumothorax. No acute osseous abnormality. Enteric tube is below the diaphragm with tip outside the field of view.      No acute cardiopulmonary abnormality. Xr Abdomen For Ng/og/ne Tube Placement    Result Date: 9/1/2020  EXAMINATION: ONE SUPINE XRAY VIEW(S) OF THE ABDOMEN 9/1/2020 6:26 am COMPARISON: 08/31/2020 HISTORY: ORDERING SYSTEM PROVIDED HISTORY: Confirmation of course of NG/OG/NE tube and location of tip of tube TECHNOLOGIST PROVIDED HISTORY: Confirmation of course of NG/OG/NE tube and location of tip of tube Portable? ->Yes Reason for Exam: ng placement Acuity: Unknown Type of Exam: Unknown FINDINGS: The nasogastric tube proximal side port is in the proximal stomach. The distal tip is in the mid gastric body. There is no evidence of bowel obstruction. There is a stable left renal calculus. No acute osseous abnormality is seen. The distal end of the nasogastric tube is in the mid gastric body. Fluoro For Surgical Procedures    Result Date: 9/1/2020  Radiology exam is complete. No Radiologist dictation. Please follow up with ordering provider. ASSESSMENT:  Active Hospital Problems    Diagnosis Date Noted    Small bowel obstruction ([facility] [address] [M10.404] 09/01/2020       1. 56 y.o. male with bowel obstruction secondary to renal mass  2. POD#2 R nephrectomy  3. POD#2 SB resection with primary anastomosis, left partial colectomy with primary anastomosis, open gastrectomy tube    Plan:  1. Continue medical management and supportive care per primary team  2. Monitor for return of bowel function  3. Continue NG to LIWS  4. ABx per primary: Cefepime, Vanc  5. F/u Urology recs   -UTI, Cx pending  7. Extubated yesterday, doing well  8.   Continue NPO until return of bowel function        Electronically signed by Lilia Arredondo DO  on 9/4/2020 at 4:52 AM

## 2020-09-05 LAB
ABSOLUTE EOS #: 0.5 K/UL (ref 0–0.44)
ABSOLUTE IMMATURE GRANULOCYTE: 0.24 K/UL (ref 0–0.3)
ABSOLUTE LYMPH #: 1.15 K/UL (ref 1.1–3.7)
ABSOLUTE MONO #: 1.22 K/UL (ref 0.1–1.2)
ALBUMIN SERPL-MCNC: 2 G/DL (ref 3.5–5.2)
ALBUMIN/GLOBULIN RATIO: 0.5 (ref 1–2.5)
ALP BLD-CCNC: 62 U/L (ref 40–129)
ALT SERPL-CCNC: 12 U/L (ref 5–41)
ANION GAP SERPL CALCULATED.3IONS-SCNC: 8 MMOL/L (ref 9–17)
AST SERPL-CCNC: 14 U/L
BASOPHILS # BLD: 0 % (ref 0–2)
BASOPHILS ABSOLUTE: 0.05 K/UL (ref 0–0.2)
BILIRUB SERPL-MCNC: 0.55 MG/DL (ref 0.3–1.2)
BUN BLDV-MCNC: 30 MG/DL (ref 8–23)
BUN/CREAT BLD: ABNORMAL (ref 9–20)
CALCIUM SERPL-MCNC: 8.1 MG/DL (ref 8.6–10.4)
CHLORIDE BLD-SCNC: 107 MMOL/L (ref 98–107)
CO2: 22 MMOL/L (ref 20–31)
CREAT SERPL-MCNC: 1.81 MG/DL (ref 0.7–1.2)
DIFFERENTIAL TYPE: ABNORMAL
EOSINOPHILS RELATIVE PERCENT: 3 % (ref 1–4)
FERRITIN: 1431 UG/L (ref 30–400)
GFR AFRICAN AMERICAN: 46 ML/MIN
GFR NON-AFRICAN AMERICAN: 38 ML/MIN
GFR SERPL CREATININE-BSD FRML MDRD: ABNORMAL ML/MIN/{1.73_M2}
GFR SERPL CREATININE-BSD FRML MDRD: ABNORMAL ML/MIN/{1.73_M2}
GLUCOSE BLD-MCNC: 236 MG/DL (ref 75–110)
GLUCOSE BLD-MCNC: 283 MG/DL (ref 75–110)
GLUCOSE BLD-MCNC: 284 MG/DL (ref 70–99)
GLUCOSE BLD-MCNC: 292 MG/DL (ref 75–110)
HCT VFR BLD CALC: 26.2 % (ref 40.7–50.3)
HCT VFR BLD CALC: 26.9 % (ref 40.7–50.3)
HEMOGLOBIN: 7.9 G/DL (ref 13–17)
HEMOGLOBIN: 8.2 G/DL (ref 13–17)
IMMATURE GRANULOCYTES: 2 %
IRON SATURATION: 14 % (ref 20–55)
IRON: 20 UG/DL (ref 59–158)
LYMPHOCYTES # BLD: 7 % (ref 24–43)
MAGNESIUM: 2.1 MG/DL (ref 1.6–2.6)
MCH RBC QN AUTO: 27.4 PG (ref 25.2–33.5)
MCH RBC QN AUTO: 27.7 PG (ref 25.2–33.5)
MCHC RBC AUTO-ENTMCNC: 30.2 G/DL (ref 28.4–34.8)
MCHC RBC AUTO-ENTMCNC: 30.5 G/DL (ref 28.4–34.8)
MCV RBC AUTO: 90 FL (ref 82.6–102.9)
MCV RBC AUTO: 91.9 FL (ref 82.6–102.9)
MONOCYTES # BLD: 8 % (ref 3–12)
NRBC AUTOMATED: 0 PER 100 WBC
NRBC AUTOMATED: 0 PER 100 WBC
PDW BLD-RTO: 13.2 % (ref 11.8–14.4)
PDW BLD-RTO: 13.8 % (ref 11.8–14.4)
PHOSPHORUS: 1.5 MG/DL (ref 2.5–4.5)
PLATELET # BLD: 301 K/UL (ref 138–453)
PLATELET # BLD: 318 K/UL (ref 138–453)
PLATELET ESTIMATE: ABNORMAL
PMV BLD AUTO: 10.7 FL (ref 8.1–13.5)
PMV BLD AUTO: 10.7 FL (ref 8.1–13.5)
POTASSIUM SERPL-SCNC: 4 MMOL/L (ref 3.7–5.3)
RBC # BLD: 2.85 M/UL (ref 4.21–5.77)
RBC # BLD: 2.99 M/UL (ref 4.21–5.77)
RBC # BLD: ABNORMAL 10*6/UL
SEG NEUTROPHILS: 80 % (ref 36–65)
SEGMENTED NEUTROPHILS ABSOLUTE COUNT: 12.65 K/UL (ref 1.5–8.1)
SODIUM BLD-SCNC: 137 MMOL/L (ref 135–144)
TOTAL IRON BINDING CAPACITY: 139 UG/DL (ref 250–450)
TOTAL PROTEIN: 5.9 G/DL (ref 6.4–8.3)
TRIGL SERPL-MCNC: 115 MG/DL
UNSATURATED IRON BINDING CAPACITY: 119 UG/DL (ref 112–347)
WBC # BLD: 15.1 K/UL (ref 3.5–11.3)
WBC # BLD: 15.8 K/UL (ref 3.5–11.3)
WBC # BLD: ABNORMAL 10*3/UL

## 2020-09-05 PROCEDURE — 2500000003 HC RX 250 WO HCPCS: Performed by: STUDENT IN AN ORGANIZED HEALTH CARE EDUCATION/TRAINING PROGRAM

## 2020-09-05 PROCEDURE — 84478 ASSAY OF TRIGLYCERIDES: CPT

## 2020-09-05 PROCEDURE — 6360000002 HC RX W HCPCS: Performed by: STUDENT IN AN ORGANIZED HEALTH CARE EDUCATION/TRAINING PROGRAM

## 2020-09-05 PROCEDURE — 6370000000 HC RX 637 (ALT 250 FOR IP): Performed by: STUDENT IN AN ORGANIZED HEALTH CARE EDUCATION/TRAINING PROGRAM

## 2020-09-05 PROCEDURE — 36415 COLL VENOUS BLD VENIPUNCTURE: CPT

## 2020-09-05 PROCEDURE — P9016 RBC LEUKOCYTES REDUCED: HCPCS

## 2020-09-05 PROCEDURE — 2580000003 HC RX 258: Performed by: STUDENT IN AN ORGANIZED HEALTH CARE EDUCATION/TRAINING PROGRAM

## 2020-09-05 PROCEDURE — 83540 ASSAY OF IRON: CPT

## 2020-09-05 PROCEDURE — 85027 COMPLETE CBC AUTOMATED: CPT

## 2020-09-05 PROCEDURE — 82947 ASSAY GLUCOSE BLOOD QUANT: CPT

## 2020-09-05 PROCEDURE — 85025 COMPLETE CBC W/AUTO DIFF WBC: CPT

## 2020-09-05 PROCEDURE — 6370000000 HC RX 637 (ALT 250 FOR IP): Performed by: SURGERY

## 2020-09-05 PROCEDURE — 2060000000 HC ICU INTERMEDIATE R&B

## 2020-09-05 PROCEDURE — 83550 IRON BINDING TEST: CPT

## 2020-09-05 PROCEDURE — 36430 TRANSFUSION BLD/BLD COMPNT: CPT

## 2020-09-05 PROCEDURE — 84100 ASSAY OF PHOSPHORUS: CPT

## 2020-09-05 PROCEDURE — 82728 ASSAY OF FERRITIN: CPT

## 2020-09-05 PROCEDURE — 80053 COMPREHEN METABOLIC PANEL: CPT

## 2020-09-05 PROCEDURE — 6360000002 HC RX W HCPCS: Performed by: SURGERY

## 2020-09-05 PROCEDURE — 83735 ASSAY OF MAGNESIUM: CPT

## 2020-09-05 PROCEDURE — 86900 BLOOD TYPING SEROLOGIC ABO: CPT

## 2020-09-05 RX ORDER — 0.9 % SODIUM CHLORIDE 0.9 %
20 INTRAVENOUS SOLUTION INTRAVENOUS ONCE
Status: COMPLETED | OUTPATIENT
Start: 2020-09-05 | End: 2020-09-05

## 2020-09-05 RX ORDER — FUROSEMIDE 10 MG/ML
20 INJECTION INTRAMUSCULAR; INTRAVENOUS ONCE
Status: COMPLETED | OUTPATIENT
Start: 2020-09-05 | End: 2020-09-05

## 2020-09-05 RX ORDER — INSULIN GLARGINE 100 [IU]/ML
15 INJECTION, SOLUTION SUBCUTANEOUS NIGHTLY
Status: DISCONTINUED | OUTPATIENT
Start: 2020-09-05 | End: 2020-09-06

## 2020-09-05 RX ORDER — FENTANYL CITRATE 50 UG/ML
25 INJECTION, SOLUTION INTRAMUSCULAR; INTRAVENOUS
Status: DISCONTINUED | OUTPATIENT
Start: 2020-09-05 | End: 2020-09-08

## 2020-09-05 RX ORDER — FENTANYL CITRATE 50 UG/ML
100 INJECTION, SOLUTION INTRAMUSCULAR; INTRAVENOUS
Status: DISCONTINUED | OUTPATIENT
Start: 2020-09-05 | End: 2020-09-05

## 2020-09-05 RX ADMIN — SODIUM CHLORIDE, PRESERVATIVE FREE 10 ML: 5 INJECTION INTRAVENOUS at 08:44

## 2020-09-05 RX ADMIN — BISACODYL 10 MG: 10 SUPPOSITORY RECTAL at 08:41

## 2020-09-05 RX ADMIN — METOCLOPRAMIDE 10 MG: 5 INJECTION, SOLUTION INTRAMUSCULAR; INTRAVENOUS at 00:33

## 2020-09-05 RX ADMIN — FUROSEMIDE 20 MG: 10 INJECTION, SOLUTION INTRAMUSCULAR; INTRAVENOUS at 20:32

## 2020-09-05 RX ADMIN — CEFEPIME HYDROCHLORIDE 1 G: 1 INJECTION, POWDER, FOR SOLUTION INTRAMUSCULAR; INTRAVENOUS at 15:15

## 2020-09-05 RX ADMIN — SODIUM CHLORIDE 20 ML: 9 INJECTION, SOLUTION INTRAVENOUS at 20:33

## 2020-09-05 RX ADMIN — ACETAMINOPHEN 1000 MG: 500 TABLET ORAL at 06:22

## 2020-09-05 RX ADMIN — INSULIN LISPRO 6 UNITS: 100 INJECTION, SOLUTION INTRAVENOUS; SUBCUTANEOUS at 12:47

## 2020-09-05 RX ADMIN — FENTANYL CITRATE 25 MCG: 50 INJECTION, SOLUTION INTRAMUSCULAR; INTRAVENOUS at 04:08

## 2020-09-05 RX ADMIN — METOPROLOL TARTRATE 5 MG: 1 INJECTION, SOLUTION INTRAVENOUS at 03:14

## 2020-09-05 RX ADMIN — HYDROMORPHONE HYDROCHLORIDE 0.5 MG: 1 INJECTION, SOLUTION INTRAMUSCULAR; INTRAVENOUS; SUBCUTANEOUS at 20:40

## 2020-09-05 RX ADMIN — CEFEPIME HYDROCHLORIDE 1 G: 1 INJECTION, POWDER, FOR SOLUTION INTRAMUSCULAR; INTRAVENOUS at 04:08

## 2020-09-05 RX ADMIN — OXYCODONE HYDROCHLORIDE 5 MG: 5 TABLET ORAL at 16:20

## 2020-09-05 RX ADMIN — METRONIDAZOLE 500 MG: 500 INJECTION, SOLUTION INTRAVENOUS at 18:02

## 2020-09-05 RX ADMIN — OXYCODONE HYDROCHLORIDE 5 MG: 5 TABLET ORAL at 08:47

## 2020-09-05 RX ADMIN — GABAPENTIN 300 MG: 300 CAPSULE ORAL at 22:32

## 2020-09-05 RX ADMIN — GABAPENTIN 300 MG: 300 CAPSULE ORAL at 13:04

## 2020-09-05 RX ADMIN — OXYCODONE HYDROCHLORIDE 5 MG: 5 TABLET ORAL at 03:10

## 2020-09-05 RX ADMIN — INSULIN GLARGINE 15 UNITS: 100 INJECTION, SOLUTION SUBCUTANEOUS at 20:40

## 2020-09-05 RX ADMIN — POTASSIUM CHLORIDE: 2 INJECTION, SOLUTION, CONCENTRATE INTRAVENOUS at 18:49

## 2020-09-05 RX ADMIN — INSULIN LISPRO 2 UNITS: 100 INJECTION, SOLUTION INTRAVENOUS; SUBCUTANEOUS at 20:40

## 2020-09-05 RX ADMIN — SODIUM CHLORIDE, PRESERVATIVE FREE 10 ML: 5 INJECTION INTRAVENOUS at 20:34

## 2020-09-05 RX ADMIN — SODIUM PHOSPHATE, MONOBASIC, MONOHYDRATE 30 MMOL: 276; 142 INJECTION, SOLUTION INTRAVENOUS at 10:54

## 2020-09-05 RX ADMIN — GABAPENTIN 300 MG: 300 CAPSULE ORAL at 06:22

## 2020-09-05 RX ADMIN — METOCLOPRAMIDE 10 MG: 5 INJECTION, SOLUTION INTRAMUSCULAR; INTRAVENOUS at 17:54

## 2020-09-05 RX ADMIN — METHOCARBAMOL TABLETS 750 MG: 750 TABLET, COATED ORAL at 13:04

## 2020-09-05 RX ADMIN — METOPROLOL TARTRATE 5 MG: 1 INJECTION, SOLUTION INTRAVENOUS at 22:32

## 2020-09-05 RX ADMIN — HEPARIN SODIUM 5000 UNITS: 5000 INJECTION INTRAVENOUS; SUBCUTANEOUS at 15:15

## 2020-09-05 RX ADMIN — Medication 10 ML: at 08:42

## 2020-09-05 RX ADMIN — SODIUM CHLORIDE, PRESERVATIVE FREE 10 ML: 5 INJECTION INTRAVENOUS at 20:35

## 2020-09-05 RX ADMIN — METHOCARBAMOL TABLETS 750 MG: 750 TABLET, COATED ORAL at 00:33

## 2020-09-05 RX ADMIN — METOCLOPRAMIDE 10 MG: 5 INJECTION, SOLUTION INTRAMUSCULAR; INTRAVENOUS at 13:04

## 2020-09-05 RX ADMIN — SODIUM CHLORIDE 200 MG: 9 INJECTION, SOLUTION INTRAVENOUS at 10:58

## 2020-09-05 RX ADMIN — HEPARIN SODIUM 5000 UNITS: 5000 INJECTION INTRAVENOUS; SUBCUTANEOUS at 22:30

## 2020-09-05 RX ADMIN — METOPROLOL TARTRATE 5 MG: 1 INJECTION, SOLUTION INTRAVENOUS at 11:20

## 2020-09-05 RX ADMIN — FAMOTIDINE 20 MG: 10 INJECTION INTRAVENOUS at 11:27

## 2020-09-05 RX ADMIN — ACETAMINOPHEN 1000 MG: 500 TABLET ORAL at 13:06

## 2020-09-05 RX ADMIN — INSULIN LISPRO 6 UNITS: 100 INJECTION, SOLUTION INTRAVENOUS; SUBCUTANEOUS at 18:14

## 2020-09-05 RX ADMIN — METRONIDAZOLE 500 MG: 500 INJECTION, SOLUTION INTRAVENOUS at 03:24

## 2020-09-05 RX ADMIN — METHOCARBAMOL TABLETS 750 MG: 750 TABLET, COATED ORAL at 06:47

## 2020-09-05 RX ADMIN — HEPARIN SODIUM 5000 UNITS: 5000 INJECTION INTRAVENOUS; SUBCUTANEOUS at 06:22

## 2020-09-05 RX ADMIN — FENTANYL CITRATE 25 MCG: 50 INJECTION, SOLUTION INTRAMUSCULAR; INTRAVENOUS at 19:03

## 2020-09-05 RX ADMIN — METHOCARBAMOL TABLETS 750 MG: 750 TABLET, COATED ORAL at 17:54

## 2020-09-05 RX ADMIN — ACETAMINOPHEN 1000 MG: 500 TABLET ORAL at 22:31

## 2020-09-05 RX ADMIN — METOPROLOL TARTRATE 5 MG: 1 INJECTION, SOLUTION INTRAVENOUS at 06:23

## 2020-09-05 RX ADMIN — Medication 10 ML: at 20:32

## 2020-09-05 RX ADMIN — METOPROLOL TARTRATE 5 MG: 1 INJECTION, SOLUTION INTRAVENOUS at 15:15

## 2020-09-05 RX ADMIN — METOPROLOL TARTRATE 5 MG: 1 INJECTION, SOLUTION INTRAVENOUS at 19:04

## 2020-09-05 RX ADMIN — METOCLOPRAMIDE 10 MG: 5 INJECTION, SOLUTION INTRAMUSCULAR; INTRAVENOUS at 06:22

## 2020-09-05 RX ADMIN — FENTANYL CITRATE 25 MCG: 50 INJECTION, SOLUTION INTRAMUSCULAR; INTRAVENOUS at 01:12

## 2020-09-05 RX ADMIN — FENTANYL CITRATE 25 MCG: 50 INJECTION, SOLUTION INTRAMUSCULAR; INTRAVENOUS at 06:28

## 2020-09-05 RX ADMIN — I.V. FAT EMULSION 250 ML: 20 EMULSION INTRAVENOUS at 18:49

## 2020-09-05 RX ADMIN — METRONIDAZOLE 500 MG: 500 INJECTION, SOLUTION INTRAVENOUS at 10:59

## 2020-09-05 ASSESSMENT — ENCOUNTER SYMPTOMS
EYE REDNESS: 0
SHORTNESS OF BREATH: 0
APNEA: 0
CONSTIPATION: 1
SINUS PAIN: 0
TROUBLE SWALLOWING: 0
CHEST TIGHTNESS: 0
WHEEZING: 0
ABDOMINAL PAIN: 1
EYE DISCHARGE: 0

## 2020-09-05 ASSESSMENT — PAIN SCALES - GENERAL
PAINLEVEL_OUTOF10: 7
PAINLEVEL_OUTOF10: 3
PAINLEVEL_OUTOF10: 8
PAINLEVEL_OUTOF10: 7
PAINLEVEL_OUTOF10: 8
PAINLEVEL_OUTOF10: 5
PAINLEVEL_OUTOF10: 6
PAINLEVEL_OUTOF10: 6
PAINLEVEL_OUTOF10: 7
PAINLEVEL_OUTOF10: 7

## 2020-09-05 NOTE — PROGRESS NOTES
[facility]  Internal Medicine Teaching Residency Program  Inpatient Daily Progress Note  ______________________________________________________________________________    Patient: Ashley Miramontes II  YOB: 1960   YTJ:[id number]    Acct: [de-identified]     Room: [address] [place] [address]  Admit date: 9/1/2020  Today's date: 09/05/20  Number of days in the hospital: 4    SUBJECTIVE   Admitting Diagnosis: <principal problem not specified>  CC: Abdominal Pain  Pt examined at bedside. Chart & results reviewed. Patient had severe abdominal pain overnight. Passing flatus and urine. No pain or cramps in the legs. Vitally stable. Taking sips of water and ice chips. Afebrile    ROS:  Constitutional:  negative for chills, fevers, sweats  Respiratory:  negative for cough, dyspnea on exertion, hemoptysis, shortness of breath, wheezing  Cardiovascular:  negative for chest pain, chest pressure/discomfort, lower extremity edema, palpitations  Gastrointestinal:  positive for abdominal pain, constipation. Neurological:  negative for dizziness, headache  BRIEF HISTORY   Patient originally presented to [facility] on 08/31 with symptoms of abdominal distention, abdominal pain, nausea emesis and inability to tolerate p.o. diet found to be severely dehydrated with acute kidney injury on CKD, elevated blood glucose in 400, leukocytosis 20,000 and tachycardic. CT abdomen pelvis was completed which demonstrated left renal mass 4.8 x 6.9 x 9.9 cm eroding into and causing small bowel obstruction.    On the CT abdomen patient was also noted to have a right ureteral stone 5 x 11 mm.   Patient was admitted to the ICU and general surgery was consulted along with urology at [address] was decompressed with NG tube, placed on IV fluids and insulin drip.  Pt was also started on cefepime and flagyl for empiric sepsis coverage. Prior to transfer to [facility] this morning patient received cystoscopy and right ureteral stent     During ICU stay patient underwent laparoscopic robotic nephrectomy and   Exploratory laparotomy bowel resection of proximal jejunum and descending colon with mobilization of splenic flexure and primary anastomosis of small bowel and colon along with placement of gastrostomy tube. OBJECTIVE     Vital Signs:  /88   Pulse 115   Temp 97.9 °F (36.6 °C) (Oral)   Resp 21   Ht 5' 11\" (1.803 m)   Wt 257 lb 8 oz (116.8 kg)   SpO2 95%   BMI 35.91 kg/m²     Temp (24hrs), Av.7 °F (37.1 °C), Min:97.9 °F (36.6 °C), Max:100.4 °F (38 °C)    In: 1474.7   Out: 2195 [Urine:1200]    Physical Exam:  Constitutional: This is a well developed, well nourished, 18.5-24.9 - Normal 56y.o. year old male who is alert, oriented, cooperative and in mild distress due to abdominal pain. Head:normocephalic and atraumatic. EENT:  PERRLA. No conjunctival injections. Septum was midline, mucosa was without erythema, exudates or cobblestoning. No thrush was noted. Neck: Supple without thyromegaly. No elevated JVP. Trachea was midline. Respiratory: Chest was symmetrical without dullness to percussion. Breath sounds bilaterally were clear to auscultation. There were no wheezes, rhonchi or rales. There is no intercostal retraction or use of accessory muscles. No egophony noted. Cardiovascular: Regular without murmur, clicks, gallops or rubs. Abdomen: Abdomen is soft tender around the surgical site and right side with rebound tenderness but no guarding or rigidity bowel sounds are positive but sluggish wound is clear  Lymphatic: No lymphadenopathy. Musculoskeletal: Normal curvature of the spine. No gross muscle weakness. Extremities:  No lower extremity edema, ulcerations, tenderness, varicosities or erythema. Muscle size, tone and strength are normal.  No involuntary movements are noted. Skin:  Warm and dry. Good color, turgor and pigmentation.  No lesions or scars.   No cyanosis or clubbing  Neurological/Psychiatric: The patient's general behavior, level of consciousness, thought content and emotional status is normal.        Medications:  Scheduled Medications:    insulin glargine  15 Units Subcutaneous Nightly    acetaminophen  1,000 mg Oral Q8H    methocarbamol  750 mg Oral Q6H    lidocaine  2 patch Transdermal Daily    gabapentin  300 mg Oral Q8H    sodium chloride  20 mL Intravenous Once    metoprolol  5 mg Intravenous Q4H    insulin lispro  0-12 Units Subcutaneous TID WC    insulin lispro  0-6 Units Subcutaneous Nightly    fat emulsion  250 mL Intravenous Daily    bisacodyl  10 mg Rectal Daily    metoclopramide  10 mg Intravenous Q6H    famotidine (PEPCID) injection  20 mg Intravenous Daily    cefepime  1 g Intravenous Q12H    sodium chloride flush  10 mL Intravenous 2 times per day    sodium chloride flush  10 mL Intravenous 2 times per day    heparin (porcine)  5,000 Units Subcutaneous 3 times per day    metroNIDAZOLE  500 mg Intravenous Q8H    sodium chloride flush  10 mL Intravenous 2 times per day     Continuous Infusions:    PN-Adult 2-in-1 Central Line (Standard) 41.7 mL/hr at 09/04/20 1748    dextrose      sodium chloride 100 mL/hr at 09/04/20 0955     PRN MedicationsfentanNYL, 25 mcg, Q2H PRN  HYDROmorphone, 0.5 mg, Q3H PRN  oxyCODONE, 5 mg, Q4H PRN  metoprolol, 5 mg, Q6H PRN  glucose, 15 g, PRN  dextrose, 12.5 g, PRN  glucagon (rDNA), 1 mg, PRN  dextrose, 100 mL/hr, PRN  sodium chloride flush, 10 mL, PRN  sodium chloride flush, 10 mL, PRN  sodium chloride flush, 10 mL, PRN  promethazine, 12.5 mg, Q6H PRN    Or  ondansetron, 4 mg, Q6H PRN        Diagnostic Labs:  CBC:   Recent Labs     09/03/20  0552 09/03/20  1523 09/04/20  0527 09/04/20  1418   WBC 17.9* 15.7* 16.1*  --    RBC 3.12* 2.96* 2.83*  --    HGB 8.5* 8.1* 7.6* 9.4*   HCT 28.1* 26.7* 25.7* 32.2*   MCV 90.1 90.2 90.8  --    RDW 13.0 13.0 12.8  --     329 316  --      BMP:   Recent Labs     09/03/20  0552 09/03/20  1324 09/04/20  0527    139 142   K 4.5 4.1 4.2    107 110*   CO2 23 22 21   PHOS  --   --  2.3*   BUN 32* 30* 28*   CREATININE 1.83* 1.84* 1.80*     BNP: No results for input(s): BNP in the last 72 hours. PT/INR: No results for input(s): PROTIME, INR in the last 72 hours. APTT: No results for input(s): APTT in the last 72 hours. CARDIAC ENZYMES: No results for input(s): CKMB, CKMBINDEX, TROPONINI in the last 72 hours. Invalid input(s): CKTOTAL;3  FASTING LIPID PANEL:  Lab Results   Component Value Date    CHOL 131 06/22/2020    HDL 31 (L) 06/22/2020    TRIG 186 (H) 06/22/2020     LIVER PROFILE:   Recent Labs     09/03/20  1324   AST 20   ALT 21   BILITOT 0.89   ALKPHOS 58      MICROBIOLOGY:   Lab Results   Component Value Date/Time    CULTURE NO GROWTH 2 DAYS 09/02/2020 04:49 AM       Imaging:    Ct Abdomen Pelvis Wo Contrast    Result Date: 9/1/2020  1. Proximal small-bowel obstruction due to extension of an infiltrative lower pole left renal mass to the mesentery and adjacent small bowel. There is marked gastric distension. 2. Infiltrative lower pole left renal mass, presumed malignant measuring 4.8 x 6.9 x 9.9 cm. Nonobstructive left nephrolithiasis. 3. Trace free pelvic fluid. Findings were discussed with Kay Chan at 3:37 pm on 8/31/2020. Xr Abdomen (kub) (single Ap View)    Addendum Date: 9/1/2020    ADDENDUM: Dose area product 1037.1 mGy per cm squared and fluoro time 5.6 seconds     Result Date: 9/1/2020  Please correlate with clinical service     Xr Acute Abd Series Chest 1 Vw    Result Date: 8/31/2020  No acute cardiopulmonary disease. 5 x 11 mm calcification, possibly a renal or proximal ureteral calculus. Nonspecific bowel-gas pattern.      Xr Chest Portable    Result Date: 9/3/2020  Line placement as above Expiratory exam with interval increase in now mild streaky bibasilar atelectasis     Xr Chest Portable    Result Date: 9/1/2020  No acute cardiopulmonary abnormality. Xr Abdomen For Ng/og/ne Tube Placement    Result Date: 9/2/2020  Enteric tube terminates at the GE junction. Recommend advancement prior to use. Xr Abdomen For Ng/og/ne Tube Placement    Result Date: 9/1/2020  The distal end of the nasogastric tube is in the mid gastric body. ASSESSMENT & PLAN     ASSESSMENT / PLAN:   IMPRESSION  This is a 56 y.o. male who presented with abdominal pain, vomiting and found to have small intestine obstruction along with left renal mass and right ureteric stone. Patient admitted to ICU where he underwent left nephrectomy through robotic surgery, exploratory laparatomy with bowel resection and ureteric stent placement.     Active Problems:    Small bowel obstruction ([address]  Plan: Patient had exploratory laparotomy bowel resection of proximal jejunum and descending colon with mobilization of splenic flexure and primary anastomosis of small bowel and colon along with placement of gastrostomy tube. Patient is currently allowed ice chips along with sips of water with medications. General surgery are okay to start TPN. Dietary consulted. Have pain at the surgery site. Tachycardia related to the pain.     Ureteric stone:  Patient had small right ureteric stone. Urology has seen the patient. Ureteric stent was with along with cystoscopy.     Renal cell carcinoma:  Patient had left renal cell carcinoma causing obstruction of the small intestine. Robotic left nephrectomy done. Type 2 diabetes mellitus with uncontrolled blood sugars:  Patient had high blood sugars of 400s on the day of admission. He was on Lantus 10 units at nighttime blood sugars around 200s. We will increase the Lantus to 15 units at nighttime. He is also on medium dose sliding scale.     Acute on chronic CKD:  Patient had acute kidney injury due to dehydration. Patient got IV fluids.   The creatinine is now around the baseline.     Sepsis secondary to probably UTI:  Blood cultures and urine cultures showed no growth to date. Still have leukocytosis. Patient is currently on cefepime.     Essential hypertension:  Patient is on Lopressor 50 mg twice daily, lisinopril 20 mg daily, hydralazine 25 mg 3 times at home. Resume as needed.     Acute blood loss anemia  Patient had low hemoglobin after surgery. 3 units of PRBC ordered per urology. Hemoglobin now is 9.4      DVT ppx : Heparin  GI ppx: Pepsid    PT/OT:   Discharge Planning / SW:     Yayo Vasques MD  Internal Medicine Resident, PGY-1  [facility];  [place], [place]  9/5/2020, 5:21 AM

## 2020-09-05 NOTE — PROGRESS NOTES
Comprehensive Nutrition Assessment    Type and Reason for Visit:  Reassess    Nutrition Recommendations/Plan:   - Continue NPO. Monitor for bowel function and plan of care. - Continue TPN - Suggest in next bag increase rate to 60 mL/hr, increase AA to 80 gm, and increase Phos and Ca. Continue current amount of dextrose and monitor blood sugars. TPN will provide 200 gm Dextrose, 80 gm AA, and 250 mL 20% IV lipids = 1500 kcals, 80 gm pro/day. - Monitor labs and modify TPN as needed. Nutrition Assessment:  TPN to continue. Pt remains NPO. Having flatus but no BM. RN reports pt with active/hypoactive bowel sounds and given suppository today. Labs reviewed: Phos 1.5 mmol/L, Glucose 284 m/dL noted - 30 mmol NaPhos replacement to be given per RN. Noted 1,295 mL G-tube output. Estimated Daily Nutrient Needs:  Energy (kcal):  9178-3464 kcal/day; Weight Used for Energy Requirements:  Ideal     Protein (g):  95 g pro/day; Weight Used for Protein Requirements:  Ideal(1.2)          Nutrition Related Findings:  Active/hypoactive BS. Labs reviewed: Phos 1.5 mmol/L, Glucose 284 mg/dL - 30 mmol NaPhos replacement. Meds reviewed: Dulcolax, Lantus, Humalog. No BM. G-tube output 1,295 mL.       Wounds:  Surgical Wound       Current Nutrition Therapies:    Diet NPO Effective Now Exceptions are: Ice Chips, Sips with Meds  PN-Adult 2-in-1 Central Line (Standard)  Current Parenteral Nutrition Orders:  · Type and Formula: 2-in-1 Standard(200 gm Dextrose, 50 gm AA)   · Lipids: 250ml, Daily  · Duration: Continuous  · Rate/Volume: 41.7 mL/hr (1000 mL/day)  · Current PN Order Provides: 200 gm dextrose, 50 gm AA at 41.7 mL/hr with 250 mL 20% IV lipids = 1380 kcals, 50 gm pro/day    Anthropometric Measures:  · Height: 5' 11\" (180.3 cm)  · Current Body Weight: 270 lb 4.5 oz (122.6 kg) - wt fluctuations since admission noted  · Usual Body Weight: 294 lb (133.4 kg)(3/16/20 per EHR)     · Ideal Body Weight: 172 lbs; % Ideal Body Weight 157%  · BMI: 37.7  · BMI Categories: Obese Class 2 (BMI 35.0 -39.9)       Nutrition Diagnosis:   · Inadequate oral intake related to altered GI function as evidenced by NPO or clear liquid status due to medical condition, nutrition support - parenteral nutrition    Nutrition Interventions:   Food and/or Nutrient Delivery:  Modify Parenteral Nutrition(Suggest in next bag increase rate to 60 mL/hr, increase AA to 80 gm, and increase Phos and Ca. Continue current amount of dextrose and monitor blood sugars.)  Nutrition Education/Counseling:  Education completed(discussed PN with pt's wife)   Coordination of Nutrition Care:  Continued Inpatient Monitoring    Goals: Progressing  meet % of estimated nutrition needs       Nutrition Monitoring and Evaluation:   Behavioral-Environmental Outcomes:  (N/A)   Food/Nutrient Intake Outcomes:  Parenteral Nutrition Intake/Tolerance, Diet Advancement/Tolerance  Physical Signs/Symptoms Outcomes:  Biochemical Data, Skin, Weight, GI Status, Hemodynamic Status     Discharge Planning:     Too soon to determine     Electronically signed by Salma Jackson RD, LD on 9/5/20 at 10:37 AM EDT    Contact: [phone]

## 2020-09-05 NOTE — H&P
ROBOTIC NEPHRECTOMY        ALLERGIES     Ampicillin; Pcn [penicillins]; and Tape [adhesive tape]    MEDICATIONS PRIOR TO ADMISSION     Prior to Admission medications    Medication Sig Start Date End Date Taking? Authorizing Provider   traMADol (ULTRAM) 50 MG tablet Take 100 mg by mouth every 6 hours as needed for Pain.     Historical Provider, MD   Melatonin 10 MG TABS Take 10 mg by mouth nightly as needed (SLEEP)    Historical Provider, MD   vitamin C (ASCORBIC ACID) 500 MG tablet Take 500 mg by mouth daily    Historical Provider, MD   lidocaine 4 % external patch Place 1 patch onto the skin daily  Patient taking differently: Place 1 patch onto the skin daily as needed (TO HIP)  8/11/20 9/10/20  Buster Dai MD   acetaminophen (TYLENOL) 500 MG tablet Take 2 tablets by mouth every 6 hours as needed for Pain 8/11/20 8/21/20  Buster Dai MD   cyclobenzaprine (FLEXERIL) 10 MG tablet Take 1 tablet by mouth 3 times daily as needed for Muscle spasms  Patient taking differently: Take 10 mg by mouth 2 times daily as needed for Muscle spasms  8/11/20   Buster Dai MD   aspirin 81 MG tablet Take 81 mg by mouth nightly     Historical Provider, MD   SITagliptin (JANUVIA) 50 MG tablet Take 25 mg by mouth daily     Historical Provider, MD   pravastatin (PRAVACHOL) 20 MG tablet Take 20 mg by mouth daily    Historical Provider, MD   metoprolol (LOPRESSOR) 50 MG tablet Take 50 mg by mouth 2 times daily    Historical Provider, MD   lisinopril (PRINIVIL;ZESTRIL) 20 MG tablet Take 20 mg by mouth daily    Historical Provider, MD   allopurinol (ZYLOPRIM) 100 MG tablet Take 100 mg by mouth daily    Historical Provider, MD   zolpidem (AMBIEN) 10 MG tablet Take by mouth nightly as needed for Sleep    Historical Provider, MD   hydrALAZINE (APRESOLINE) 25 MG tablet Take 25 mg by mouth 3 times daily     Historical Provider, MD   furosemide (LASIX) 40 MG tablet Take 40 mg by mouth daily as needed (LEG SWELLING) Historical Provider, MD       SOCIAL HISTORY     Tobacco:  Never smoked  Alcohol: 1-2 drinks over the weekend  Illicits: No  Occupation:     FAMILY HISTORY     Family History   Problem Relation Age of Onset    Stroke Maternal Grandmother     Stroke Maternal Grandfather        PHYSICAL EXAM     Vitals: /83   Pulse 112   Temp 98.2 °F (36.8 °C) (Oral)   Resp 17   Ht 5' 11\" (1.803 m)   Wt 257 lb 8 oz (116.8 kg)   SpO2 100%   BMI 35.91 kg/m²   Tmax: Temp (24hrs), Av.8 °F (37.1 °C), Min:98.2 °F (36.8 °C), Max:100.4 °F (38 °C)    Last Body weight:   Wt Readings from Last 3 Encounters:   20 257 lb 8 oz (116.8 kg)   20 255 lb (115.7 kg)   08/10/20 263 lb (119.3 kg)     Body Mass Index : Body mass index is 35.91 kg/m². PHYSICAL EXAMINATION:  Constitutional: This is a well developed, well nourished, 18.5-24.9 - Normal 56y.o. year old male who is alert, oriented, cooperative and in mild distress due to the pain. Head:normocephalic and atraumatic. EENT:  PERRLA. No conjunctival injections. Septum was midline, mucosa was without erythema, exudates or cobblestoning. No thrush was noted. Neck: Supple without thyromegaly. No elevated JVP. Trachea was midline. Respiratory: Chest was symmetrical without dullness to percussion. Breath sounds bilaterally were clear to auscultation. There were no wheezes, rhonchi or rales. There is no intercostal retraction or use of accessory muscles. No egophony noted. Cardiovascular: Regular without murmur, clicks, gallops or rubs. Abdomen: Abdomen is soft tender to palpation around the surgical site and right side. With rebound tenderness but no guarding or rigidity bowel sounds are positive but sluggish. Lymphatic: No lymphadenopathy. Musculoskeletal: Normal curvature of the spine. No gross muscle weakness. Extremities:  No lower extremity edema, ulcerations, tenderness, varicosities or erythema.   Muscle size, tone and strength are Segs Absolute 12.91 (H) 1.50 - 8.10 k/uL    Absolute Lymph # 1.38 1.10 - 3.70 k/uL    Absolute Mono # 1.28 (H) 0.10 - 1.20 k/uL    Absolute Eos # 0.35 0.00 - 0.44 k/uL    Basophils Absolute 0.06 0.00 - 0.20 k/uL    Absolute Immature Granulocyte 0.16 0.00 - 0.30 k/uL   PHOSPHORUS    Collection Time: 09/04/20  5:27 AM   Result Value Ref Range    Phosphorus 2.3 (L) 2.5 - 4.5 mg/dL   MAGNESIUM    Collection Time: 09/04/20  5:27 AM   Result Value Ref Range    Magnesium 1.9 1.6 - 2.6 mg/dL   POC Glucose Fingerstick    Collection Time: 09/04/20  8:22 AM   Result Value Ref Range    POC Glucose 133 (H) 75 - 110 mg/dL   POC Glucose Fingerstick    Collection Time: 09/04/20 11:45 AM   Result Value Ref Range    POC Glucose 169 (H) 75 - 110 mg/dL   Hemoglobin and Hematocrit, Blood, Post Transfusion    Collection Time: 09/04/20  2:18 PM   Result Value Ref Range    Hemoglobin 9.4 (L) 13.0 - 17.0 g/dL    Hematocrit 32.2 (L) 40.7 - 50.3 %   POC Glucose Fingerstick    Collection Time: 09/04/20  6:15 PM   Result Value Ref Range    POC Glucose 194 (H) 75 - 110 mg/dL   POC Glucose Fingerstick    Collection Time: 09/04/20  9:45 PM   Result Value Ref Range    POC Glucose 232 (H) 75 - 110 mg/dL       Imaging:   Ct Abdomen Pelvis Wo Contrast    Result Date: 9/1/2020  1. Proximal small-bowel obstruction due to extension of an infiltrative lower pole left renal mass to the mesentery and adjacent small bowel. There is marked gastric distension. 2. Infiltrative lower pole left renal mass, presumed malignant measuring 4.8 x 6.9 x 9.9 cm. Nonobstructive left nephrolithiasis. 3. Trace free pelvic fluid. Findings were discussed with Luis Rojas at 3:37 pm on 8/31/2020.      Xr Abdomen (kub) (single Ap View)    Addendum Date: 9/1/2020    ADDENDUM: Dose area product 1037.1 mGy per cm squared and fluoro time 5.6 seconds     Result Date: 9/1/2020  Please correlate with clinical service     Xr Acute Abd Series Chest 1 Vw    Result Date: 8/31/2020  No acute cardiopulmonary disease. 5 x 11 mm calcification, possibly a renal or proximal ureteral calculus. Nonspecific bowel-gas pattern. Xr Chest Portable    Result Date: 9/3/2020  Line placement as above Expiratory exam with interval increase in now mild streaky bibasilar atelectasis     Xr Chest Portable    Result Date: 9/1/2020  No acute cardiopulmonary abnormality. Xr Abdomen For Ng/og/ne Tube Placement    Result Date: 9/2/2020  Enteric tube terminates at the GE junction. Recommend advancement prior to use. Xr Abdomen For Ng/og/ne Tube Placement    Result Date: 9/1/2020  The distal end of the nasogastric tube is in the mid gastric body. ASSESSMENT & PLAN     ASSESSMENT / PLAN:     IMPRESSION  This is a 56 y.o. male who presented with abdominal pain, vomiting and found to have small intestine obstruction along with left renal mass and right ureteric stone. Patient admitted to ICU where he underwent left nephrectomy through robotic surgery, exploratory laparatomy with bowel resection and ureteric stent placement. Active Problems:    Small bowel obstruction (HCC)  Plan: Patient had exploratory laparotomy bowel resection of proximal jejunum and descending colon with mobilization of splenic flexure and primary anastomosis of small bowel and colon along with placement of gastrostomy tube. Patient is currently allowed ice chips along with sips of water with medications. General surgery are okay to start TPN. Dietary consulted. Have pain at the surgery site. Tachycardia related to the pain. Ureteric stone:  Patient had small right ureteric stone. Urology has seen the patient. Ureteric stent was with along with cystoscopy. Renal cell carcinoma:  Patient had left renal cell carcinoma causing obstruction of the small intestine. Robotic left nephrectomy done.      Type 2 diabetes mellitus with uncontrolled blood sugars:  Patient had high blood sugars of 400s on the day of admission. He was on Lantus 10 units at nighttime blood sugars around 200s. We will increase the Lantus to 15 units at nighttime. He is also on medium dose sliding scale. Acute on chronic CKD:  Patient had acute kidney injury due to dehydration. Patient got IV fluids. The creatinine is now around the baseline. Sepsis secondary to probably UTI:  Blood cultures and urine cultures showed no growth to date. Still have leukocytosis. Patient is currently on cefepime. Essential hypertension:  Patient is on Lopressor 50 mg twice daily, lisinopril 20 mg daily, hydralazine 25 mg 3 times at home. Resume as needed. Acute blood loss anemia  Patient had low hemoglobin after surgery. 3 units of PRBC ordered per urology. Globin now is 9.4          DVT ppx: Heparin  GI ppx: Pepsid    PT/OT/SW  Discharge Planning:     Zoë Shine MD  Internal Medicine Resident, PGY-1  [facility]; [place], [place]  9/4/2020, 10:40 PM    Attestation and add on       I have discussed the care of Mark Ballard II , including pertinent history and exam findings,      9/5/20    with the resident. I have seen and examined the patient and the key elements of all parts of the encounter have been performed by me . I agree with the assessment, plan and orders as documented by the resident. Active Problems:    Small bowel obstruction (HCC)  Resolved Problems:    * No resolved hospital problems. *            ---- ;        Medications: Allergies: Allergies   Allergen Reactions    Ampicillin Swelling     Swelling of throat.  Pcn [Penicillins] Swelling     Throat swelling. Tolerated cefepime during 8/31/20 admission.     Tape Chalmer Bookbinder Tape]        Current Meds:   Scheduled Meds:    insulin glargine  15 Units Subcutaneous Nightly    sodium phosphate IVPB  30 mmol Intravenous Once    iron sucrose  200 mg Intravenous Q24H    acetaminophen  1,000 mg Oral Q8H    methocarbamol  750 mg Oral Q6H    lidocaine  2 patch Transdermal Daily    gabapentin  300 mg Oral Q8H    sodium chloride  20 mL Intravenous Once    metoprolol  5 mg Intravenous Q4H    insulin lispro  0-12 Units Subcutaneous TID WC    insulin lispro  0-6 Units Subcutaneous Nightly    fat emulsion  250 mL Intravenous Daily    bisacodyl  10 mg Rectal Daily    metoclopramide  10 mg Intravenous Q6H    famotidine (PEPCID) injection  20 mg Intravenous Daily    cefepime  1 g Intravenous Q12H    sodium chloride flush  10 mL Intravenous 2 times per day    sodium chloride flush  10 mL Intravenous 2 times per day    heparin (porcine)  5,000 Units Subcutaneous 3 times per day    metroNIDAZOLE  500 mg Intravenous Q8H    sodium chloride flush  10 mL Intravenous 2 times per day     Continuous Infusions:    PN-Adult 2-in-1 Central Line (Standard) 41.7 mL/hr at 09/04/20 1748    dextrose      sodium chloride 100 mL/hr at 09/04/20 0955     PRN Meds: fentanNYL, HYDROmorphone, oxyCODONE, metoprolol, glucose, dextrose, glucagon (rDNA), dextrose, sodium chloride flush, sodium chloride flush, sodium chloride flush, promethazine **OR** ondansetron        [facility]  [address] [place] [address].    Phone [phone]   Fax: [phone]  Answering Service: [phone]

## 2020-09-05 NOTE — PROGRESS NOTES
General Surgery:  Daily Progress Note  POD#3 s/p  R nephrectomy, SB resection    PATIENT NAME: Michelle Stern II     TODAY'S DATE: 9/5/2020, 5:43 AM    SUBJECTIVE:     Pt seen and examined at bedside. No overnight events. No BM or flatus, Good urine output. On TPN, NPO. Moderate NGT output  Afebrile, T-max 99.3. OBJECTIVE:   VITALS:  /88   Pulse 115   Temp 97.9 °F (36.6 °C) (Oral)   Resp 21   Ht 5' 11\" (1.803 m)   Wt 270 lb 4.5 oz (122.6 kg)   SpO2 95%   BMI 37.70 kg/m²      INTAKE/OUTPUT:      Intake/Output Summary (Last 24 hours) at 9/5/2020 0543  Last data filed at 9/5/2020 0540  Gross per 24 hour   Intake 2740.95 ml   Output 3845 ml   Net -1104.05 ml       PHYSICAL EXAM:  General Appearance: Alert, oriented, fatigued  HEENT:  Normocephalic, atraumatic, mucus membranes moist. NGT in place  Heart: Tachycardic, regular rhythm  Lungs: intubated  Abdomen: Soft, distended, winces with palpation of abdomen. Gastrostomy tube in place with green liquid in collection. Dressings pain, dry, intact  Extremities: No cyanosis, pitting edema, rashes noted. Skin: Skin color, texture, turgor normal. No rashes or lesions.       Data:  CBC:   Recent Labs     09/03/20  0552 09/03/20  1523 09/04/20  0527 09/04/20  1418   WBC 17.9* 15.7* 16.1*  --    HGB 8.5* 8.1* 7.6* 9.4*    329 316  --      Chemistry:   Recent Labs     09/03/20  0552 09/03/20  1324 09/04/20  0527    139 142   K 4.5 4.1 4.2    107 110*   CO2 23 22 21   GLUCOSE 313* 217* 167*   BUN 32* 30* 28*   CREATININE 1.83* 1.84* 1.80*   MG  --   --  1.9   ANIONGAP 11 10 11   LABGLOM 38* 37* 38*   GFRAA 46* 45* 47*   CALCIUM 7.8* 8.0* 8.3*   PHOS  --   --  2.3*     Hepatic:   Recent Labs     09/03/20  1324   AST 20   ALT 21   ALKPHOS 58   BILITOT 0.89     Coagulation:   Recent Labs     09/03/20  1324   PROT 6.0*         Radiology Review:    Xr Abdomen (kub) (single Ap View)    Addendum Date: 9/1/2020    ADDENDUM: Dose area product 1037.1 mGy per cm squared and fluoro time 5.6 seconds     Result Date: 9/1/2020  EXAMINATION: ONE SUPINE XRAY VIEW(S) OF THE ABDOMEN 9/1/2020 9:32 pm COMPARISON: 16 hours ago HISTORY: ORDERING SYSTEM PROVIDED HISTORY: cysto with right stent TECHNOLOGIST PROVIDED HISTORY: cysto with right stent Reason for Exam: cysto Acuity: Acute Type of Exam: Initial FINDINGS: Double-J ureteral stent present. On the contralateral side there appears to be a nephrolith. Please correlate with clinical service     Xr Chest Portable    Result Date: 9/1/2020  EXAMINATION: ONE XRAY VIEW OF THE CHEST 9/1/2020 8:31 pm COMPARISON: 05/14/2010 HISTORY: ORDERING SYSTEM PROVIDED HISTORY: evaluation TECHNOLOGIST PROVIDED HISTORY: evaluation Reason for Exam: evaluation Acuity: Acute Type of Exam: Initial FINDINGS: Cardiomediastinal silhouette is unchanged in size. No pulmonary consolidation, pleural effusion, or pneumothorax. No acute osseous abnormality. Enteric tube is below the diaphragm with tip outside the field of view. No acute cardiopulmonary abnormality. Xr Abdomen For Ng/og/ne Tube Placement    Result Date: 9/1/2020  EXAMINATION: ONE SUPINE XRAY VIEW(S) OF THE ABDOMEN 9/1/2020 6:26 am COMPARISON: 08/31/2020 HISTORY: ORDERING SYSTEM PROVIDED HISTORY: Confirmation of course of NG/OG/NE tube and location of tip of tube TECHNOLOGIST PROVIDED HISTORY: Confirmation of course of NG/OG/NE tube and location of tip of tube Portable? ->Yes Reason for Exam: ng placement Acuity: Unknown Type of Exam: Unknown FINDINGS: The nasogastric tube proximal side port is in the proximal stomach. The distal tip is in the mid gastric body. There is no evidence of bowel obstruction. There is a stable left renal calculus. No acute osseous abnormality is seen. The distal end of the nasogastric tube is in the mid gastric body. Fluoro For Surgical Procedures    Result Date: 9/1/2020  Radiology exam is complete. No Radiologist dictation. Please follow up with ordering provider. ASSESSMENT:  Active Hospital Problems    Diagnosis Date Noted    Small bowel obstruction ([facility] [address] [P18.177] 09/01/2020       1. 56 y.o. male with bowel obstruction secondary to renal mass  2. POD#3 R nephrectomy  3. POD#3 SB resection with primary anastomosis, left partial colectomy with primary anastomosis, open gastrectomy tube    Plan:  1. Continue medical management and supportive care per primary team  2. Monitor for return of bowel function  3. Continue NG to LIWS  4. ABx per primary: Cefepime, Flagyl  5. F/u Urology recs  6. TPN, NPO.   PO pending return of bowel function        Electronically signed by Karin Collins DO  on 9/5/2020 at 5:43 AM

## 2020-09-05 NOTE — PROGRESS NOTES
Pt ambulated 30 feet with assistance and walker. Tolerated well. Pt has not passed gas yet. Bowel sounds active. No nausea.

## 2020-09-05 NOTE — PROGRESS NOTES
I have discussed the care of this patient including pertinent history and exam findings, with the resident. I have seen and examined the patient and the key elements of all parts of the encounter have been performed by me. I agree with the assessment, plan and orders as documented by the resident. Yvon Fowler    Urology Progress Note  CC:  Left renal mass  Subjective:   Lois Mejia is a 56 y.o. male. His/Her current Diet is: Diet NPO Effective Now Exceptions are: Ice Chips, Sips with Meds  PN-Adult 2-in-1 [facility] (Standard). The patient is 2 Days Post-Op from Procedure(s):  LAPAROSCOPIC XI ROBOTIC NEPHRECTOMY  EXPLORATORY LAPAROTOMY, RESECTION OF PROXIMAL JEJUNUM AND DESCENDING COLON WITH MOBILIZATION OF SPLENIC FLEXURE AND PRIMARY ANASTAMOSISOF SMALL BOWEL AND COLON, PLACEMENT OF GASTROSTOMY TUBE  No acute urologic events overnight.    No chest pain, shortness of breath, vomiting, fevers, chills  Does complain of some nausea  Ambulating up to chair  On nasal cannula oxygen at 2 L/min  is not passing flatus  Pain intermittently controlled with Roxicodone and Dilaudid  On insulin drip  UOP 2300 cc in 24 hours  Gastrostomy tube with 1200 cc    Patient Vitals for the past 24 hrs:   BP Temp Temp src Pulse Resp SpO2 Weight   09/05/20 0823 (!) 137/94 98.4 °F (36.9 °C) Oral 117 17 97 % --   09/05/20 0426 139/88 97.9 °F (36.6 °C) Oral 115 21 97 % 270 lb 4.5 oz (122.6 kg)   09/05/20 0026 (!) 132/95 98.6 °F (37 °C) Oral 119 20 95 % --   09/04/20 1945 131/83 98.2 °F (36.8 °C) Oral 112 17 97 % --   09/04/20 1730 125/87 -- -- 117 19 -- --   09/04/20 1715 -- -- -- 128 18 100 % --   09/04/20 1700 (!) 129/91 -- -- 125 18 100 % --   09/04/20 1645 -- -- -- 120 19 100 % --   09/04/20 1630 (!) 128/90 -- -- 123 19 100 % --   09/04/20 1615 -- -- -- 124 17 100 % --   09/04/20 1600 123/84 98.4 °F (36.9 °C) Oral 120 18 99 % --   09/04/20 1545 -- -- -- 112 16 100 % --   09/04/20 1530 119/83 -- -- 126 18 100 % --   09/04/20 1515 -- -- -- 123 17 100 % --   09/04/20 1500 133/82 -- -- 121 18 100 % --   09/04/20 1445 -- -- -- 126 24 100 % --   09/04/20 1430 119/81 -- -- 124 18 100 % --   09/04/20 1415 -- -- -- 120 18 -- --   09/04/20 1400 124/89 -- -- 125 17 -- --   09/04/20 1345 -- -- -- 117 14 100 % --   09/04/20 1330 (!) 111/97 -- -- 159 19 -- --   09/04/20 1315 -- -- -- 116 16 100 % --   09/04/20 1300 117/77 -- -- 114 15 100 % --   09/04/20 1245 -- -- -- 118 18 100 % --   09/04/20 1230 117/79 -- -- 119 17 100 % --   09/04/20 1215 -- -- -- 112 17 100 % --   09/04/20 1200 130/76 98.4 °F (36.9 °C) Oral 121 16 100 % --   09/04/20 1145 -- -- -- 120 14 100 % --   09/04/20 1130 127/66 -- -- 116 14 100 % --   09/04/20 1115 -- -- -- 115 16 100 % --   09/04/20 1100 112/76 -- -- 115 14 100 % --   09/04/20 1045 -- -- -- 122 18 99 % --   09/04/20 1030 122/73 -- -- 116 15 99 % --   09/04/20 1015 -- -- -- 115 17 99 % --   09/04/20 1000 120/81 -- -- 110 18 99 % --   09/04/20 0949 129/80 99.3 °F (37.4 °C) Oral 137 17 99 % --   09/04/20 0945 -- -- -- 139 18 99 % --   09/04/20 0930 129/80 -- -- 137 15 99 % --   09/04/20 0915 107/84 99.3 °F (37.4 °C) Oral 143 18 99 % --   09/04/20 0914 -- -- -- 140 16 99 % --       Intake/Output Summary (Last 24 hours) at 9/5/2020 0903  Last data filed at 9/5/2020 0540  Gross per 24 hour   Intake 2240.98 ml   Output 3245 ml   Net -1004.02 ml       Recent Labs     09/03/20  1523 09/04/20  0527 09/04/20  1418 09/05/20  0553   WBC 15.7* 16.1*  --  15.8*   HGB 8.1* 7.6* 9.4* 7.9*   HCT 26.7* 25.7* 32.2* 26.2*   MCV 90.2 90.8  --  91.9    316  --  318     Recent Labs     09/03/20  1324 09/04/20  0527 09/05/20  0553    142 137   K 4.1 4.2 4.0    110* 107   CO2 22 21 22   PHOS  --  2.3* 1.5*   BUN 30* 28* 30*   CREATININE 1.84* 1.80* 1.81*       No results for input(s): COLORU, PHUR, LABCAST, WBCUA, RBCUA, MUCUS, TRICHOMONAS, YEAST, BACTERIA, CLARITYU, SPECGRAV, LEUKOCYTESUR, UROBILINOGEN, BILIRUBINUR, BLOODU in the last 72 hours.     Invalid input(s): NITRATE, GLUCOSEUKETONESUAMORPHOUS    Current Facility-Administered Medications   Medication Dose Route Frequency Provider Last Rate Last Dose    fentaNYL (SUBLIMAZE) injection 25 mcg  25 mcg Intravenous Q2H PRN Raphael Brunson MD   25 mcg at 09/05/20 0628    insulin glargine (LANTUS) injection vial 15 Units  15 Units Subcutaneous Nightly Belkys Vang MD        sodium phosphate 30 mmol in dextrose 5 % 250 mL IVPB  30 mmol Intravenous Once Belkys Vang MD        iron sucrose (VENOFER) 200 mg in sodium chloride 0.9 % 100 mL IVPB  200 mg Intravenous Q24H Belkys Vang MD        HYDROmorphone (DILAUDID) injection 0.5 mg  0.5 mg Intravenous Q3H PRN Raphael Brunson MD   0.5 mg at 09/04/20 2246    acetaminophen (TYLENOL) tablet 1,000 mg  1,000 mg Oral Q8H Mandy Steve, DO   1,000 mg at 09/05/20 2220    methocarbamol (ROBAXIN) tablet 750 mg  750 mg Oral Q6H Mandy Steve, DO   750 mg at 09/05/20 1163    lidocaine 4 % external patch 2 patch  2 patch Transdermal Daily Mandy Steve, DO   2 patch at 09/05/20 4424    gabapentin (NEURONTIN) capsule 300 mg  300 mg Oral Q8H Mandy Steve, DO   300 mg at 09/05/20 7661    oxyCODONE (ROXICODONE) immediate release tablet 5 mg  5 mg Oral Q4H PRN Mandy Steve, DO   5 mg at 09/05/20 0847    0.9 % sodium chloride bolus  20 mL Intravenous Once Donte Fine MD   Stopped at 09/04/20 0836    metoprolol (LOPRESSOR) injection 5 mg  5 mg Intravenous Q4H Matthew Hyatt MD   5 mg at 09/05/20 6924    insulin lispro (HUMALOG) injection vial 0-12 Units  0-12 Units Subcutaneous TID WC Matthew Hyatt MD   2 Units at 09/04/20 1817    insulin lispro (HUMALOG) injection vial 0-6 Units  0-6 Units Subcutaneous Nightly Matthew Hytat MD   2 Units at 09/04/20 2150    PN-Adult 2-in-1 Central [facility] (Standard)   Intravenous Continuous TPN Mandy Wilson DO 41.7 mL/hr at 09/04/20 1747      fat emulsion 20 % infusion 250 mL  250 mL Intravenous Daily Orbie Lanes,    Stopped at 09/05/20 0548    bisacodyl (DULCOLAX) suppository 10 mg  10 mg Rectal Daily Reenatine Oneida Doty, DO   10 mg at 09/05/20 0841    metoclopramide (REGLAN) injection 10 mg  10 mg Intravenous Q6H Chrystine Oneida Doty, DO   10 mg at 09/05/20 2609    metoprolol (LOPRESSOR) injection 5 mg  5 mg Intravenous Q6H PRN Matthew Hyatt MD   5 mg at 09/04/20 0947    famotidine (PEPCID) injection 20 mg  20 mg Intravenous Daily Matthew Hyatt MD   20 mg at 09/04/20 0813    cefepime (MAXIPIME) 1 g IVPB minibag  1 g Intravenous Q12H Pernell Snyder MD   Stopped at 09/05/20 0438    glucose (GLUTOSE) 40 % oral gel 15 g  15 g Oral PRN Sheri Armendariz MD        dextrose 50 % IV solution  12.5 g Intravenous PRN Sheri Armendariz MD        glucagon (rDNA) injection 1 mg  1 mg Intramuscular PRN Sheri Armendariz MD        dextrose 5 % solution  100 mL/hr Intravenous PRN Sheri Armendariz MD        sodium chloride flush 0.9 % injection 10 mL  10 mL Intravenous 2 times per day Sheri Armendariz MD   10 mL at 09/05/20 0844    sodium chloride flush 0.9 % injection 10 mL  10 mL Intravenous PRN Sheri Armendariz MD        sodium chloride flush 0.9 % injection 10 mL  10 mL Intravenous 2 times per day Sheri Armendariz MD   10 mL at 09/05/20 0842    sodium chloride flush 0.9 % injection 10 mL  10 mL Intravenous PRN Sheri Armendariz MD        heparin (porcine) injection 5,000 Units  5,000 Units Subcutaneous 3 times per day Sheri Armendariz MD   5,000 Units at 09/05/20 0622    metronidazole (FLAGYL) 500 mg in NaCl 100 mL IVPB premix  500 mg Intravenous Vandana Guerrero MD   Stopped at 09/05/20 0424    0.9 % sodium chloride infusion   Intravenous Continuous Thi Lugo  mL/hr at 09/04/20 0955      sodium chloride flush 0.9 % injection 10 mL  10 mL Intravenous 2 times per day Sheri Armendariz MD   10 mL at 09/05/20 0891    sodium chloride flush 0.9 % injection 10 mL  10 mL Intravenous

## 2020-09-06 LAB
ABO/RH: NORMAL
ABSOLUTE EOS #: 0.67 K/UL (ref 0–0.44)
ABSOLUTE IMMATURE GRANULOCYTE: 0.38 K/UL (ref 0–0.3)
ABSOLUTE LYMPH #: 1.31 K/UL (ref 1.1–3.7)
ABSOLUTE MONO #: 1.46 K/UL (ref 0.1–1.2)
ANION GAP SERPL CALCULATED.3IONS-SCNC: 12 MMOL/L (ref 9–17)
ANTIBODY SCREEN: NEGATIVE
ARM BAND NUMBER: NORMAL
BASOPHILS # BLD: 1 % (ref 0–2)
BASOPHILS ABSOLUTE: 0.07 K/UL (ref 0–0.2)
BLD PROD TYP BPU: NORMAL
BUN BLDV-MCNC: 28 MG/DL (ref 8–23)
BUN/CREAT BLD: ABNORMAL (ref 9–20)
CALCIUM SERPL-MCNC: 8.4 MG/DL (ref 8.6–10.4)
CHLORIDE BLD-SCNC: 104 MMOL/L (ref 98–107)
CO2: 23 MMOL/L (ref 20–31)
CREAT SERPL-MCNC: 1.74 MG/DL (ref 0.7–1.2)
CROSSMATCH RESULT: NORMAL
CULTURE: NORMAL
CULTURE: NORMAL
DIFFERENTIAL TYPE: ABNORMAL
DISPENSE STATUS BLOOD BANK: NORMAL
EOSINOPHILS RELATIVE PERCENT: 5 % (ref 1–4)
EXPIRATION DATE: NORMAL
GFR AFRICAN AMERICAN: 48 ML/MIN
GFR NON-AFRICAN AMERICAN: 40 ML/MIN
GFR SERPL CREATININE-BSD FRML MDRD: ABNORMAL ML/MIN/{1.73_M2}
GFR SERPL CREATININE-BSD FRML MDRD: ABNORMAL ML/MIN/{1.73_M2}
GLUCOSE BLD-MCNC: 260 MG/DL (ref 75–110)
GLUCOSE BLD-MCNC: 274 MG/DL (ref 75–110)
GLUCOSE BLD-MCNC: 293 MG/DL (ref 70–99)
GLUCOSE BLD-MCNC: 309 MG/DL (ref 75–110)
GLUCOSE BLD-MCNC: 321 MG/DL (ref 75–110)
HCT VFR BLD CALC: 29.2 % (ref 40.7–50.3)
HEMOGLOBIN: 9.3 G/DL (ref 13–17)
IMMATURE GRANULOCYTES: 3 %
LYMPHOCYTES # BLD: 9 % (ref 24–43)
Lab: NORMAL
Lab: NORMAL
MAGNESIUM: 2 MG/DL (ref 1.6–2.6)
MCH RBC QN AUTO: 28.1 PG (ref 25.2–33.5)
MCHC RBC AUTO-ENTMCNC: 31.8 G/DL (ref 28.4–34.8)
MCV RBC AUTO: 88.2 FL (ref 82.6–102.9)
MONOCYTES # BLD: 10 % (ref 3–12)
NRBC AUTOMATED: 0 PER 100 WBC
PDW BLD-RTO: 14.2 % (ref 11.8–14.4)
PHOSPHORUS: 2.1 MG/DL (ref 2.5–4.5)
PLATELET # BLD: 297 K/UL (ref 138–453)
PLATELET ESTIMATE: ABNORMAL
PMV BLD AUTO: 11.1 FL (ref 8.1–13.5)
POTASSIUM SERPL-SCNC: 3.8 MMOL/L (ref 3.7–5.3)
RBC # BLD: 3.31 M/UL (ref 4.21–5.77)
RBC # BLD: ABNORMAL 10*6/UL
SEG NEUTROPHILS: 72 % (ref 36–65)
SEGMENTED NEUTROPHILS ABSOLUTE COUNT: 10.83 K/UL (ref 1.5–8.1)
SODIUM BLD-SCNC: 139 MMOL/L (ref 135–144)
SPECIMEN DESCRIPTION: NORMAL
SPECIMEN DESCRIPTION: NORMAL
TRANSFUSION STATUS: NORMAL
UNIT DIVISION: 0
UNIT NUMBER: NORMAL
WBC # BLD: 14.7 K/UL (ref 3.5–11.3)
WBC # BLD: ABNORMAL 10*3/UL

## 2020-09-06 PROCEDURE — 6360000002 HC RX W HCPCS: Performed by: STUDENT IN AN ORGANIZED HEALTH CARE EDUCATION/TRAINING PROGRAM

## 2020-09-06 PROCEDURE — 97116 GAIT TRAINING THERAPY: CPT

## 2020-09-06 PROCEDURE — 99232 SBSQ HOSP IP/OBS MODERATE 35: CPT | Performed by: INTERNAL MEDICINE

## 2020-09-06 PROCEDURE — 6370000000 HC RX 637 (ALT 250 FOR IP): Performed by: STUDENT IN AN ORGANIZED HEALTH CARE EDUCATION/TRAINING PROGRAM

## 2020-09-06 PROCEDURE — 97110 THERAPEUTIC EXERCISES: CPT

## 2020-09-06 PROCEDURE — 2500000003 HC RX 250 WO HCPCS: Performed by: STUDENT IN AN ORGANIZED HEALTH CARE EDUCATION/TRAINING PROGRAM

## 2020-09-06 PROCEDURE — 84100 ASSAY OF PHOSPHORUS: CPT

## 2020-09-06 PROCEDURE — 2580000003 HC RX 258: Performed by: STUDENT IN AN ORGANIZED HEALTH CARE EDUCATION/TRAINING PROGRAM

## 2020-09-06 PROCEDURE — 6370000000 HC RX 637 (ALT 250 FOR IP): Performed by: SURGERY

## 2020-09-06 PROCEDURE — 97530 THERAPEUTIC ACTIVITIES: CPT

## 2020-09-06 PROCEDURE — 97535 SELF CARE MNGMENT TRAINING: CPT

## 2020-09-06 PROCEDURE — 85025 COMPLETE CBC W/AUTO DIFF WBC: CPT

## 2020-09-06 PROCEDURE — 83735 ASSAY OF MAGNESIUM: CPT

## 2020-09-06 PROCEDURE — 82947 ASSAY GLUCOSE BLOOD QUANT: CPT

## 2020-09-06 PROCEDURE — 83036 HEMOGLOBIN GLYCOSYLATED A1C: CPT

## 2020-09-06 PROCEDURE — 6360000002 HC RX W HCPCS: Performed by: SURGERY

## 2020-09-06 PROCEDURE — 36415 COLL VENOUS BLD VENIPUNCTURE: CPT

## 2020-09-06 PROCEDURE — 94761 N-INVAS EAR/PLS OXIMETRY MLT: CPT

## 2020-09-06 PROCEDURE — 2060000000 HC ICU INTERMEDIATE R&B

## 2020-09-06 PROCEDURE — 80048 BASIC METABOLIC PNL TOTAL CA: CPT

## 2020-09-06 RX ORDER — DEXTROSE MONOHYDRATE 25 G/50ML
12.5 INJECTION, SOLUTION INTRAVENOUS PRN
Status: DISCONTINUED | OUTPATIENT
Start: 2020-09-06 | End: 2020-09-08

## 2020-09-06 RX ORDER — NICOTINE POLACRILEX 4 MG
15 LOZENGE BUCCAL PRN
Status: DISCONTINUED | OUTPATIENT
Start: 2020-09-06 | End: 2020-09-08

## 2020-09-06 RX ORDER — DEXTROSE MONOHYDRATE 50 MG/ML
100 INJECTION, SOLUTION INTRAVENOUS PRN
Status: DISCONTINUED | OUTPATIENT
Start: 2020-09-06 | End: 2020-09-08

## 2020-09-06 RX ORDER — INSULIN GLARGINE 100 [IU]/ML
22 INJECTION, SOLUTION SUBCUTANEOUS NIGHTLY
Status: DISCONTINUED | OUTPATIENT
Start: 2020-09-06 | End: 2020-09-07

## 2020-09-06 RX ORDER — LIDOCAINE 4 G/G
1 PATCH TOPICAL DAILY
Status: COMPLETED | OUTPATIENT
Start: 2020-09-06 | End: 2020-09-07

## 2020-09-06 RX ADMIN — METOPROLOL TARTRATE 5 MG: 1 INJECTION, SOLUTION INTRAVENOUS at 22:02

## 2020-09-06 RX ADMIN — METOPROLOL TARTRATE 5 MG: 1 INJECTION, SOLUTION INTRAVENOUS at 01:09

## 2020-09-06 RX ADMIN — METRONIDAZOLE 500 MG: 500 INJECTION, SOLUTION INTRAVENOUS at 09:41

## 2020-09-06 RX ADMIN — ACETAMINOPHEN 1000 MG: 500 TABLET ORAL at 14:48

## 2020-09-06 RX ADMIN — METRONIDAZOLE 500 MG: 500 INJECTION, SOLUTION INTRAVENOUS at 02:00

## 2020-09-06 RX ADMIN — HYDROMORPHONE HYDROCHLORIDE 0.5 MG: 1 INJECTION, SOLUTION INTRAMUSCULAR; INTRAVENOUS; SUBCUTANEOUS at 03:13

## 2020-09-06 RX ADMIN — OXYCODONE HYDROCHLORIDE 5 MG: 5 TABLET ORAL at 07:40

## 2020-09-06 RX ADMIN — FAMOTIDINE 20 MG: 10 INJECTION INTRAVENOUS at 09:52

## 2020-09-06 RX ADMIN — ACETAMINOPHEN 1000 MG: 500 TABLET ORAL at 22:02

## 2020-09-06 RX ADMIN — BISACODYL 10 MG: 10 SUPPOSITORY RECTAL at 10:38

## 2020-09-06 RX ADMIN — METHOCARBAMOL TABLETS 750 MG: 750 TABLET, COATED ORAL at 00:44

## 2020-09-06 RX ADMIN — METOPROLOL TARTRATE 5 MG: 1 INJECTION, SOLUTION INTRAVENOUS at 06:16

## 2020-09-06 RX ADMIN — METOCLOPRAMIDE 10 MG: 5 INJECTION, SOLUTION INTRAMUSCULAR; INTRAVENOUS at 00:44

## 2020-09-06 RX ADMIN — METOPROLOL TARTRATE 5 MG: 1 INJECTION, SOLUTION INTRAVENOUS at 14:44

## 2020-09-06 RX ADMIN — I.V. FAT EMULSION 250 ML: 20 EMULSION INTRAVENOUS at 17:40

## 2020-09-06 RX ADMIN — SODIUM PHOSPHATE, MONOBASIC, MONOHYDRATE 30 MMOL: 276; 142 INJECTION, SOLUTION INTRAVENOUS at 11:10

## 2020-09-06 RX ADMIN — SODIUM CHLORIDE, PRESERVATIVE FREE 10 ML: 5 INJECTION INTRAVENOUS at 20:49

## 2020-09-06 RX ADMIN — METOPROLOL TARTRATE 5 MG: 1 INJECTION, SOLUTION INTRAVENOUS at 17:30

## 2020-09-06 RX ADMIN — INSULIN LISPRO 8 UNITS: 100 INJECTION, SOLUTION INTRAVENOUS; SUBCUTANEOUS at 23:34

## 2020-09-06 RX ADMIN — GABAPENTIN 300 MG: 300 CAPSULE ORAL at 22:02

## 2020-09-06 RX ADMIN — Medication 10 ML: at 09:51

## 2020-09-06 RX ADMIN — INSULIN LISPRO 6 UNITS: 100 INJECTION, SOLUTION INTRAVENOUS; SUBCUTANEOUS at 14:39

## 2020-09-06 RX ADMIN — METOPROLOL TARTRATE 5 MG: 1 INJECTION, SOLUTION INTRAVENOUS at 02:54

## 2020-09-06 RX ADMIN — FENTANYL CITRATE 25 MCG: 50 INJECTION, SOLUTION INTRAMUSCULAR; INTRAVENOUS at 10:36

## 2020-09-06 RX ADMIN — CEFEPIME HYDROCHLORIDE 1 G: 1 INJECTION, POWDER, FOR SOLUTION INTRAMUSCULAR; INTRAVENOUS at 02:54

## 2020-09-06 RX ADMIN — METHOCARBAMOL TABLETS 750 MG: 750 TABLET, COATED ORAL at 06:15

## 2020-09-06 RX ADMIN — METHOCARBAMOL TABLETS 750 MG: 750 TABLET, COATED ORAL at 11:47

## 2020-09-06 RX ADMIN — CEFEPIME HYDROCHLORIDE 1 G: 1 INJECTION, POWDER, FOR SOLUTION INTRAMUSCULAR; INTRAVENOUS at 15:52

## 2020-09-06 RX ADMIN — HYDROMORPHONE HYDROCHLORIDE 0.5 MG: 1 INJECTION, SOLUTION INTRAMUSCULAR; INTRAVENOUS; SUBCUTANEOUS at 23:32

## 2020-09-06 RX ADMIN — Medication 10 ML: at 20:47

## 2020-09-06 RX ADMIN — HEPARIN SODIUM 5000 UNITS: 5000 INJECTION INTRAVENOUS; SUBCUTANEOUS at 06:15

## 2020-09-06 RX ADMIN — GABAPENTIN 300 MG: 300 CAPSULE ORAL at 06:15

## 2020-09-06 RX ADMIN — METHOCARBAMOL TABLETS 750 MG: 750 TABLET, COATED ORAL at 17:31

## 2020-09-06 RX ADMIN — METOPROLOL TARTRATE 5 MG: 1 INJECTION, SOLUTION INTRAVENOUS at 09:51

## 2020-09-06 RX ADMIN — INSULIN LISPRO 8 UNITS: 100 INJECTION, SOLUTION INTRAVENOUS; SUBCUTANEOUS at 17:30

## 2020-09-06 RX ADMIN — HEPARIN SODIUM 5000 UNITS: 5000 INJECTION INTRAVENOUS; SUBCUTANEOUS at 14:39

## 2020-09-06 RX ADMIN — GABAPENTIN 300 MG: 300 CAPSULE ORAL at 14:39

## 2020-09-06 RX ADMIN — POTASSIUM CHLORIDE: 2 INJECTION, SOLUTION, CONCENTRATE INTRAVENOUS at 17:40

## 2020-09-06 RX ADMIN — METHOCARBAMOL TABLETS 750 MG: 750 TABLET, COATED ORAL at 23:32

## 2020-09-06 RX ADMIN — SODIUM CHLORIDE 200 MG: 9 INJECTION, SOLUTION INTRAVENOUS at 10:18

## 2020-09-06 RX ADMIN — INSULIN GLARGINE 22 UNITS: 100 INJECTION, SOLUTION SUBCUTANEOUS at 20:56

## 2020-09-06 RX ADMIN — HEPARIN SODIUM 5000 UNITS: 5000 INJECTION INTRAVENOUS; SUBCUTANEOUS at 22:05

## 2020-09-06 RX ADMIN — SODIUM CHLORIDE, PRESERVATIVE FREE 10 ML: 5 INJECTION INTRAVENOUS at 09:55

## 2020-09-06 RX ADMIN — METRONIDAZOLE 500 MG: 500 INJECTION, SOLUTION INTRAVENOUS at 17:28

## 2020-09-06 RX ADMIN — ACETAMINOPHEN 1000 MG: 500 TABLET ORAL at 06:15

## 2020-09-06 ASSESSMENT — PAIN DESCRIPTION - PAIN TYPE
TYPE: SURGICAL PAIN
TYPE: ACUTE PAIN;SURGICAL PAIN

## 2020-09-06 ASSESSMENT — PAIN DESCRIPTION - LOCATION
LOCATION: ABDOMEN

## 2020-09-06 ASSESSMENT — PAIN SCALES - GENERAL
PAINLEVEL_OUTOF10: 4
PAINLEVEL_OUTOF10: 7
PAINLEVEL_OUTOF10: 7
PAINLEVEL_OUTOF10: 4
PAINLEVEL_OUTOF10: 6
PAINLEVEL_OUTOF10: 7
PAINLEVEL_OUTOF10: 8
PAINLEVEL_OUTOF10: 7
PAINLEVEL_OUTOF10: 7

## 2020-09-06 ASSESSMENT — PAIN DESCRIPTION - FREQUENCY
FREQUENCY: CONTINUOUS
FREQUENCY: CONTINUOUS

## 2020-09-06 ASSESSMENT — PAIN DESCRIPTION - ORIENTATION
ORIENTATION: MID
ORIENTATION: RIGHT;LEFT

## 2020-09-06 ASSESSMENT — PAIN DESCRIPTION - DESCRIPTORS
DESCRIPTORS: DISCOMFORT
DESCRIPTORS: DISCOMFORT

## 2020-09-06 ASSESSMENT — PAIN - FUNCTIONAL ASSESSMENT: PAIN_FUNCTIONAL_ASSESSMENT: PREVENTS OR INTERFERES SOME ACTIVE ACTIVITIES AND ADLS

## 2020-09-06 NOTE — PROGRESS NOTES
General Surgery:  Daily Progress Note  POD #5 s/p  R nephrectomy, SB resection    PATIENT NAME: Michelle Stern II     TODAY'S DATE: 9/6/2020, 4:24 AM    SUBJECTIVE:     Pt seen and examined at bedside. No overnight events. Patient reports small amount of flatus yesterday after walking with PT, still no bowel movement. Good urine output. On TPN, NPO. Moderate NGT output  Afebrile, T-max 98.6. OBJECTIVE:   VITALS:  BP (!) 142/86   Pulse 93   Temp 97.9 °F (36.6 °C) (Axillary)   Resp 19   Ht 5' 11\" (1.803 m)   Wt 270 lb 4.5 oz (122.6 kg)   SpO2 94%   BMI 37.70 kg/m²      INTAKE/OUTPUT:      Intake/Output Summary (Last 24 hours) at 9/6/2020 0424  Last data filed at 9/5/2020 1920  Gross per 24 hour   Intake 1505.88 ml   Output 2075 ml   Net -569.12 ml       PHYSICAL EXAM:  General Appearance: Alert, oriented. HEENT:  Normocephalic, atraumatic, mucus membranes moist.   Heart:  Mild tachycardia, regular rhythm  Lungs: Unlabored breathing  Abdomen: Soft, distended, winces with palpation of abdomen. Gastrostomy tube in place with green liquid in collection. Dressings pain, dry, intact  Extremities: No cyanosis, pitting edema, rashes noted. Skin: Skin color, texture, turgor normal. No rashes or lesions.       Data:  CBC:   Recent Labs     09/04/20  0527 09/04/20  1418 09/05/20  0553 09/05/20  1843   WBC 16.1*  --  15.8* 15.1*   HGB 7.6* 9.4* 7.9* 8.2*     --  318 301     Chemistry:   Recent Labs     09/03/20  1324 09/04/20  0527 09/05/20  0553    142 137   K 4.1 4.2 4.0    110* 107   CO2 22 21 22   GLUCOSE 217* 167* 284*   BUN 30* 28* 30*   CREATININE 1.84* 1.80* 1.81*   MG  --  1.9 2.1   ANIONGAP 10 11 8*   LABGLOM 37* 38* 38*   GFRAA 45* 47* 46*   CALCIUM 8.0* 8.3* 8.1*   PHOS  --  2.3* 1.5*     Hepatic:   Recent Labs     09/03/20  1324 09/05/20  0553   AST 20 14   ALT 21 12   ALKPHOS 58 62   BILITOT 0.89 0.55     Coagulation:   Recent Labs     09/03/20  1324 09/05/20  0553   PROT 6. 0* 5.9*         Radiology Review:    Xr Abdomen (kub) (single Ap View)    Addendum Date: 9/1/2020    ADDENDUM: Dose area product 1037.1 mGy per cm squared and fluoro time 5.6 seconds     Result Date: 9/1/2020  EXAMINATION: ONE SUPINE XRAY VIEW(S) OF THE ABDOMEN 9/1/2020 9:32 pm COMPARISON: 16 hours ago HISTORY: ORDERING SYSTEM PROVIDED HISTORY: cysto with right stent TECHNOLOGIST PROVIDED HISTORY: cysto with right stent Reason for Exam: cysto Acuity: Acute Type of Exam: Initial FINDINGS: Double-J ureteral stent present. On the contralateral side there appears to be a nephrolith. Please correlate with clinical service     Xr Chest Portable    Result Date: 9/1/2020  EXAMINATION: ONE XRAY VIEW OF THE CHEST 9/1/2020 8:31 pm COMPARISON: 05/14/2010 HISTORY: ORDERING SYSTEM PROVIDED HISTORY: evaluation TECHNOLOGIST PROVIDED HISTORY: evaluation Reason for Exam: evaluation Acuity: Acute Type of Exam: Initial FINDINGS: Cardiomediastinal silhouette is unchanged in size. No pulmonary consolidation, pleural effusion, or pneumothorax. No acute osseous abnormality. Enteric tube is below the diaphragm with tip outside the field of view. No acute cardiopulmonary abnormality. Xr Abdomen For Ng/og/ne Tube Placement    Result Date: 9/1/2020  EXAMINATION: ONE SUPINE XRAY VIEW(S) OF THE ABDOMEN 9/1/2020 6:26 am COMPARISON: 08/31/2020 HISTORY: ORDERING SYSTEM PROVIDED HISTORY: Confirmation of course of NG/OG/NE tube and location of tip of tube TECHNOLOGIST PROVIDED HISTORY: Confirmation of course of NG/OG/NE tube and location of tip of tube Portable? ->Yes Reason for Exam: ng placement Acuity: Unknown Type of Exam: Unknown FINDINGS: The nasogastric tube proximal side port is in the proximal stomach. The distal tip is in the mid gastric body. There is no evidence of bowel obstruction. There is a stable left renal calculus. No acute osseous abnormality is seen.      The distal end of the nasogastric tube is in the mid

## 2020-09-06 NOTE — PROGRESS NOTES
Urology Progress Note  CC:  Left renal mass  Subjective:   Nu Kathleen is a 56 y.o. male. His/Her current Diet is: Diet NPO Effective Now Exceptions are: Ice Chips, Sips with Meds  PN-Adult 2-in-1 [facility] (Standard). The patient is 4 Days Post-Op from Procedure(s):  LAPAROSCOPIC XI ROBOTIC NEPHRECTOMY  EXPLORATORY LAPAROTOMY, RESECTION OF PROXIMAL JEJUNUM AND DESCENDING COLON WITH MOBILIZATION OF SPLENIC FLEXURE AND PRIMARY ANASTAMOSISOF SMALL BOWEL AND COLON, PLACEMENT OF GASTROSTOMY TUBE  No acute urologic events overnight.    No chest pain, shortness of breath, vomiting, fevers, chills  Nausea improving  Ambulating up to chair  On nasal cannula oxygen at 2 L/min  Pain intermittently controlled with Roxicodone and Dilaudid  UOP 2075 cc in 24 hours  Gastrostomy tube with 700 cc    Patient Vitals for the past 24 hrs:   BP Temp Temp src Pulse Resp SpO2 Weight   09/06/20 0717 (!) 144/88 99.7 °F (37.6 °C) Axillary 105 15 96 % --   09/06/20 0515 -- -- -- -- -- -- 270 lb 4.5 oz (122.6 kg)   09/06/20 0315 (!) 142/86 -- -- 93 19 94 % --   09/06/20 0245 138/86 97.9 °F (36.6 °C) Axillary 103 18 95 % --   09/06/20 0230 128/83 98.6 °F (37 °C) Axillary 100 19 94 % --   09/06/20 0202 132/87 99.1 °F (37.3 °C) Axillary 104 17 95 % --   09/06/20 0130 137/87 99 °F (37.2 °C) Axillary 104 22 94 % --   09/06/20 0100 (!) 172/99 97.9 °F (36.6 °C) Oral 116 22 93 % --   09/06/20 0030 (!) 159/91 97.8 °F (36.6 °C) Oral 113 21 93 % --   09/06/20 0015 (!) 142/90 97.9 °F (36.6 °C) Oral 108 22 94 % --   09/05/20 2359 (!) 143/86 99.4 °F (37.4 °C) Oral 112 20 94 % --   09/05/20 2355 (!) 141/87 99.4 °F (37.4 °C) Oral 110 20 94 % --   09/05/20 2349 (!) 154/87 99.8 °F (37.7 °C) Oral 111 20 93 % --   09/05/20 2340 (!) 149/83 99.9 °F (37.7 °C) -- 108 21 -- --   09/05/20 2254 (!) 169/98 98.6 °F (37 °C) Oral 104 22 93 % --   09/05/20 1926 (!) 148/91 98.6 °F (37 °C) Oral 96 20 95 % --   09/05/20 1915 (!) 141/88 -- -- 91 19 95 % -- 09/05/20 1750 (!) 149/94 -- Oral 107 18 95 % --   09/05/20 1745 (!) 145/90 98.6 °F (37 °C) Oral 106 16 94 % --   09/05/20 1740 (!) 142/85 98.6 °F (37 °C) Oral 101 18 94 % --   09/05/20 1725 (!) 140/91 98.2 °F (36.8 °C) -- 105 20 -- --   09/05/20 1554 (!) 152/93 98.2 °F (36.8 °C) -- 101 19 -- --   09/05/20 1201 (!) 150/92 97.5 °F (36.4 °C) Oral 97 17 95 % --       Intake/Output Summary (Last 24 hours) at 9/6/2020 0944  Last data filed at 9/6/2020 0500  Gross per 24 hour   Intake 3359.04 ml   Output 2775 ml   Net 584.04 ml       Recent Labs     09/05/20  0553 09/05/20  1843 09/06/20  0603   WBC 15.8* 15.1* 14.7*   HGB 7.9* 8.2* 9.3*   HCT 26.2* 26.9* 29.2*   MCV 91.9 90.0 88.2    301 297     Recent Labs     09/04/20  0527 09/05/20  0553 09/06/20  0603    137 139   K 4.2 4.0 3.8   * 107 104   CO2 21 22 23   PHOS 2.3* 1.5* 2.1*   BUN 28* 30* 28*   CREATININE 1.80* 1.81* 1.74*       No results for input(s): COLORU, PHUR, LABCAST, WBCUA, RBCUA, MUCUS, TRICHOMONAS, YEAST, BACTERIA, CLARITYU, SPECGRAV, LEUKOCYTESUR, UROBILINOGEN, BILIRUBINUR, BLOODU in the last 72 hours.     Invalid input(s): NITRATE, GLUCOSEUKETONESUAMORPHOUS    Current Facility-Administered Medications   Medication Dose Route Frequency Provider Last Rate Last Dose    sodium phosphate 30 mmol in dextrose 5 % 250 mL IVPB  30 mmol Intravenous Once Javed Hagen MD        insulin glargine (LANTUS) injection vial 22 Units  22 Units Subcutaneous Nightly Javed Hagen MD        fentaNYL (SUBLIMAZE) injection 25 mcg  25 mcg Intravenous Q2H PRN Gabby Peñaloza MD   25 mcg at 09/05/20 1903    iron sucrose (VENOFER) 200 mg in sodium chloride 0.9 % 100 mL IVPB  200 mg Intravenous Q24H Javed Hagen MD   Stopped at 09/05/20 1158    PN-Adult 2-in-1 Central Line (Standard)   Intravenous Continuous TPN Holger Mcmahan DO 60 mL/hr at 09/05/20 1849      HYDROmorphone (DILAUDID) injection 0.5 mg  0.5 mg Intravenous Q3H PRN Gabby Peñaloza MD   0.5 mg injection 10 mL  10 mL Intravenous 2 times per day Patricia Alejandre MD   10 mL at 09/05/20 2034    sodium chloride flush 0.9 % injection 10 mL  10 mL Intravenous PRN Patricia Alejandre MD        sodium chloride flush 0.9 % injection 10 mL  10 mL Intravenous 2 times per day Patricia Alejandre MD   10 mL at 09/05/20 2032    sodium chloride flush 0.9 % injection 10 mL  10 mL Intravenous PRN Patricia Alejandre MD        heparin (porcine) injection 5,000 Units  5,000 Units Subcutaneous 3 times per day Patricia Alejandre MD   5,000 Units at 09/06/20 0615    metronidazole (FLAGYL) 500 mg in NaCl 100 mL IVPB premix  500 mg Intravenous Belkis Paniagua MD   Stopped at 09/06/20 0345    0.9 % sodium chloride infusion   Intravenous Continuous Matthew Hyatt  mL/hr at 09/04/20 0955      sodium chloride flush 0.9 % injection 10 mL  10 mL Intravenous 2 times per day Patricia Alejandre MD   10 mL at 09/05/20 2035    sodium chloride flush 0.9 % injection 10 mL  10 mL Intravenous PRN Patricia Alejandre MD        promethazine (PHENERGAN) tablet 12.5 mg  12.5 mg Oral Q6H PRN Patricia Alejandre MD        Or    ondansetron (ZOFRAN) injection 4 mg  4 mg Intravenous Q6H PRN Patircia Alejandre MD                Additional Lab/culture results:  BCx ng x 4 days   UCx no growth from 9/2  UCx no growth from 9/1    Physical Exam:   NAD  AOx3  Peripheral pulses palpable  Respirations nonlabored, symmetric chest rise bilaterally, on nasal cannula oxygen  Soft, appropriately tender, midline incisions with gaps between staples, no expressible fluid.  Other incisions c/d/i  G tube with bilious fluid from stomach/1200 cc for 24 hours  No calf ttp bilaterally  EPC cuffs on and functioning billaterally      Interval Imaging Findings:    Impression:    POD 4 from  Laparoscopic robotic nephrectomy and exploratory laparotomy with bowel resection of proximal jejunum and descending colon  - Acute blood loss anemia   - small bowel obstruction likely

## 2020-09-06 NOTE — PROGRESS NOTES
Activity Restriction  Other position/activity restrictions: up with assistance; G tube to gravity  Subjective   General  Chart Reviewed: Orders, Progress Notes, History and Physical, Imaging, Labs, Operative Notes  Patient assessed for rehabilitation services?: Yes  Family / Caregiver Present: Yes (wife)  General Comment  Comments: RN ok'd for therapy visit this date. Pt agreeable to session, pleasent/cooperative throughout. Pain Assessment  Pain Assessment: 0-10  Pain Level: 7  Patient's Stated Pain Goal: No pain  Pain Type: Surgical pain  Pain Location: Abdomen  Pain Orientation: Mid  Pain Descriptors: Discomfort  Pain Frequency: Continuous  Response to Pain Intervention: Patient Satisfied    Objective    ADL  Feeding: Independent  Grooming: Stand by assistance;Setup; Increased time to complete(washed face/head, brushed teeth seated in recliner)  UE Bathing: Minimal assistance;Setup; Increased time to complete(assist w/back)  LE Bathing: Maximum assistance;Setup; Increased time to complete(assist w/lower legs, feet and bottom)  UE Dressing: Minimal assistance;Setup; Increased time to complete(w/gown)  LE Dressing: Maximum assistance;Setup; Increased time to complete(w/footies)  Toileting: Moderate assistance;Setup; Increased time to complete(able to use urinal, needs assist w/james care after BM)      Pt in bed upon arrival. Pt and wife educ on AE/DME, EC/WS tech, Fall Prev, Safety with func mob and adls. Pt and wife verbalized understanding. Pt wanting to use bathroom. Bed mob and func mob w/SW to bathroom for toileting with increased time to complete. Pt transferred to recliner and setup at tray table for self care using sx soap(see above for LOF). Pt needed increased time to complete all tasks d/t pain, fatigue and needed rest breaks. Pt remained up in recliner, call light and phone in reach. RN notified. Telesitter in room.      Balance  Sitting Balance: Stand by assistance(sitting at eob, on toilet and in recliner)  Standing Balance: Contact guard assistance(w/SW, no LOB)  Standing Balance  Time: pt stood for approx 10 min total for transfers from eob to bathroom, james care and then to recliner  Comment: w/SW and no LOB  Functional Mobility  Functional - Mobility Device: Standard Walker  Assist Level: Minimal assistance  Functional Mobility Comments: assist w/SW and IV pole  Toilet Transfers  Toilet - Technique: Ambulating  Equipment Used: Standard toilet  Toilet Transfer: Moderate assistance  Toilet Transfers Comments: low toilet in room w/toilet rails to assist  Bed mobility  Rolling to Right: Modified independent  Supine to Sit: Moderate assistance(w/HOB elevated and using bed rail)  Sit to Supine: Unable to assess(left up in chair)  Scooting: Stand by assistance  Transfers  Stand Step Transfers: Minimal assistance  Sit to stand: Minimal-Moderate assistance (depends on surface level)  Stand to sit: Contact guard assistance  Transfer Comments: w/SW and vc's for hand placement      Plan   Plan  Times per week: 4-5x/week  Current Treatment Recommendations: Safety Education & Training, Patient/Caregiver Education & Training, Self-Care / ADL, Functional Mobility Training, Equipment Evaluation, Education, & procurement, Endurance Training    Goals  Short term goals  Time Frame for Short term goals: By discharge:  Short term goal 1: Pt will complete bed mobility Independently. Short term goal 2: Pt will complete functional trasnfers/functional mobility Independently. Short term goal 3: Pt will demo UB ADLs with Mod I, use of AD/AE PRN. Short term goal 4: Pt will demo LB ADLs with Min A, use of AD/AE PRN.   Short term goal 5: Pt will demo ~15 minutes of standing tolerance during functional tasks to increase participation and independence in ADL/IADL task s     Therapy Time   Individual Concurrent Group Co-treatment   Time In  1315         Time Out  1445         Minutes   90 total tx time                 [address] [facility]/L

## 2020-09-06 NOTE — PROGRESS NOTES
Physical Therapy  Facility/Department: [address] STEPDOWN  Daily Treatment Note  NAME: Sunita Ricci II  : 1960  MRN: [id number]    Date of Service: 2020    Discharge Recommendations:  Patient would benefit from continued therapy after discharge   PT Equipment Recommendations  Equipment Needed: No    Assessment   Body structures, Functions, Activity limitations: Decreased functional mobility ; Decreased posture;Decreased endurance;Decreased strength;Decreased balance; Increased pain  Assessment: Pt amb 80 ft with RW and CGA. Pleasant and cooperative to work with, motivated to improve. WOUld benefit from continued PT after d/c. Prognosis: Good  REQUIRES PT FOLLOW UP: Yes  Activity Tolerance  Activity Tolerance: Patient limited by pain; Patient Tolerated treatment well     Patient Diagnosis(es): There were no encounter diagnoses. has a past medical history of Back pain, Cellulitis, Diabetes mellitus ([address] Gout, History of kidney cancer, and Hypertension. has a past surgical history that includes Ankle surgery; knee surgery (Bilateral); Carpal tunnel release (Bilateral); Cystoscopy (Right, 2020); total nephrectomy (Left, 2020); Abdominal exploration surgery (2020); hc cath power picc triple (9/3/2020); Kidney surgery (Left, 2020); and Small intestine surgery (N/A, 2020). Restrictions  Restrictions/Precautions  Restrictions/Precautions: General Precautions, Surgical Protocols, Fall Risk, Up as Tolerated  Required Braces or Orthoses?: No  Position Activity Restriction  Other position/activity restrictions: up with assistance; G tube to gravity  Subjective   General  Response To Previous Treatment: Patient with no complaints from previous session.   Family / Caregiver Present: No  Subjective  Subjective: Pt resting in bed upon arrival, agreeable to PT  Pain Screening  Patient Currently in Pain: Yes  Pain Assessment  Pain Assessment: 0-10  Pain Level: 7  Pain Type: Surgical pain  Pain Location: Abdomen  Functional Pain Assessment: Prevents or interferes some active activities and ADLs  Response to Pain Intervention: Patient Satisfied  Vital Signs  Patient Currently in Pain: Yes       Orientation  Orientation  Overall Orientation Status: Within Normal Limits  Cognition      Objective   Bed mobility  Rolling to Right: Minimal assistance  Supine to Sit: Minimal assistance  Scooting: Minimal assistance  Comment: Cues for log rolling, MIN trunk support wt HOB raised  Transfers  Sit to Stand: Minimal Assistance  Stand to sit: Minimal Assistance  Ambulation  Ambulation?: Yes  Ambulation 1  Surface: level tile  Device: Rolling Walker  Assistance: Contact guard assistance  Quality of Gait: flexed posture, extremely slow zafar, wide NEEL, steady, no LOB  Distance: 80 ft  Stairs/Curb  Stairs?: No     Balance  Posture: Good  Sitting - Static: Good  Sitting - Dynamic: Fair;+  Standing - Static: Fair  Standing - Dynamic: Fair;-  Comments: wt RW for support    Exercise  Reclined sitting ankle pumps, quad set, glut set, heel slides x 10  Goals  Short term goals  Time Frame for Short term goals: 14  Short term goal 1: Pt to perform bed mobility CGA  Short term goal 2: Pt to demonstrate functional transfers Terrance  Short term goal 3: Ambulate 100ft w/ RW CGA  Short term goal 4: Tolerate 30 minutes of therapy to demo increased endurance  Patient Goals   Patient goals : Did not state    Plan    Plan  Times per week: 5-6x/week  Current Treatment Recommendations: Strengthening, Transfer Training, Endurance Training, Patient/Caregiver Education & Training, ROM, Balance Training, Gait Training, Home Exercise Program, Functional Mobility Training, Stair training, Safety Education & Training  Safety Devices  Type of devices: Call light within reach, Gait belt, Nurse notified, Patient at risk for falls, Left in chair  Restraints  Initially in place: No     Therapy Time   Individual Concurrent Group Co-treatment   Time In 8219         Time Out 1112         Minutes 33         Timed Code Treatment Minutes: [address] [place], [place]

## 2020-09-06 NOTE — PLAN OF CARE
Problem: Pain:  Goal: Pain level will decrease  Description: Pain level will decrease  Outcome: Ongoing     Problem: Pain:  Goal: Control of acute pain  Description: Control of acute pain  Outcome: Ongoing     Problem: Pain:  Goal: Control of chronic pain  Description: Control of chronic pain  Outcome: Ongoing   Pt able to communicate pain as needed, uses call light appropriately. Pt satisfied with pain interventions.    Electronically signed by Cliff Charles RN on 9/6/2020 at 5:32 AM

## 2020-09-06 NOTE — PROGRESS NOTES
Comprehensive Nutrition Assessment    Type and Reason for Visit:  Reassess    Nutrition Recommendations/Plan:   - Continue NPO. Monitor for bowel function, start of oral diet, and plan of care. - Continue TPN - Suggest in next bag increase rate to 65 mL/hr, increase AA to 95 gm, and increase Phos, Mg, K, and Ca. Add 15 units of insulin to next bag. TPN will provide 200 gm Dextrose, 95 gm AA, and 250 mL 20% IV lipids = 1560 kcals, 95 gm pro/day. - Monitor labs and modify TPN as needed. Nutrition Assessment:  TPN to continue. Pt remains NPO but may be allowed some clears later today per RN. Having flatus but no BM. Tolerating G-tube clamping - noted output of 700 mL yesterday. Labs reviewed: Phos 2.1 mg/dL, Glucose 260-293 mg/dL - recieved 30 mmol NaPhos replacement today. Estimated Daily Nutrient Needs:  Energy (kcal):  6346-8309 kcal/day; Weight Used for Energy Requirements:  Ideal     Protein (g):  95 g pro/day; Weight Used for Protein Requirements:  Ideal(1.2)          Nutrition Related Findings:  Active/hypoactive BS. Flatus but no BM. Labs reviewed: Phos 2.1 mg/dL, Glucose 260-293 mg/dL - 30 mmol NaPhos replacement given. Meds reviewed: Lantus, Humalog. G-tube clamped.       Wounds:  Surgical Wound       Current Nutrition Therapies:    Diet NPO Effective Now Exceptions are: Ice Chips, Sips with Meds  PN-Adult 2-in-1 Central Line (Standard)  Current Parenteral Nutrition Orders:  · Type and Formula: 2-in-1 Custom(200 gm Dextrose, 80 gm AA)   · Lipids: 250ml, Daily  · Duration: Continuous  · Rate/Volume: 60 mL/hr (1440 mL/day)  · Current PN Order Provides: 200 gm dextrose, 80 gm AA at 60 mL/hr with 250 mL 20% IL = 1500 kcals, 80 gm pro/day    Anthropometric Measures:  · Height: 5' 11\" (180.3 cm)  · Current Body Weight: 270 lb 4.5 oz (122.6 kg)   · Usual Body Weight: 294 lb (133.4 kg)(3/16/20 per EHR)     · Ideal Body Weight: 172 lbs; % Ideal Body Weight 157%  · BMI: 37.7  · BMI Categories: Obese Class 2 (BMI 35.0 -39.9)       Nutrition Diagnosis:   · Inadequate oral intake related to altered GI function as evidenced by NPO or clear liquid status due to medical condition, nutrition support - parenteral nutrition    Nutrition Interventions:   Food and/or Nutrient Delivery:  Modify Parenteral Nutrition, Continue NPO  Nutrition Education/Counseling:  Education completed(discussed PN with pt's wife)   Coordination of Nutrition Care:  Continued Inpatient Monitoring    Goals: Progressing Towards  meet % of estimated nutrition needs       Nutrition Monitoring and Evaluation:   Behavioral-Environmental Outcomes:  (N/A)   Food/Nutrient Intake Outcomes:  Parenteral Nutrition Intake/Tolerance  Physical Signs/Symptoms Outcomes:  Biochemical Data, Skin, Weight, GI Status, Hemodynamic Status     Discharge Planning:     Too soon to determine     Electronically signed by Kenyon Cotter RD, LD on 9/6/20 at 2:21 PM EDT    Contact: [phone]

## 2020-09-06 NOTE — PROGRESS NOTES
[facility]  Internal Medicine Teaching Residency Program  Inpatient Daily Progress Note  ______________________________________________________________________________    Patient: Olga Lidia Bautista II  YOB: 1960   WXR:[id number]    Acct: [de-identified]     Room: [address] [place] [address]  Admit date: 9/1/2020  Today's date: 09/06/20  Number of days in the hospital: 5    SUBJECTIVE   Admitting Diagnosis: <principal problem not specified>  CC: abdominal pain  Pt examined at bedside. Chart & results reviewed. Patient had no acute events overnight. Abdominal pain has improved. Afebrile. Surgical team want to start the patient on clear fluid diet. ROS:  Constitutional:  negative for chills, fevers, sweats  Respiratory:  negative for cough, dyspnea on exertion, hemoptysis, shortness of breath, wheezing  Cardiovascular:  negative for chest pain, chest pressure/discomfort, lower extremity edema, palpitations  Gastrointestinal: Positive  for abdominal pain and negative for constipation, diarrhea, nausea, vomiting  Neurological:  negative for dizziness, headache  BRIEF HISTORY     Patient originally presented to [facility] on 08/31 with symptoms of abdominal distention, abdominal pain, nausea emesis and inability to tolerate p.o. diet found to be severely dehydrated with acute kidney injury on CKD, elevated blood glucose in 400, leukocytosis 20,000 and tachycardic. CT abdomen pelvis was completed which demonstrated left renal mass 4.8 x 6.9 x 9.9 cm eroding into and causing small bowel obstruction.    On the CT abdomen patient was also noted to have a right ureteral stone 5 x 11 mm.   Patient was admitted to the ICU and general surgery was consulted along with urology at [address] was decompressed with NG tube, placed on IV fluids and insulin drip.  Pt was also started on cefepime and flagyl for empiric sepsis coverage. [facility] patient received cystoscopy and right ureteral stent     During ICU stay patient underwent laparoscopic robotic nephrectomy and   Exploratory laparotomy bowel resection of proximal jejunum and descending colon with mobilization of splenic flexure and primary anastomosis of small bowel and colon along with placement of gastrostomy tube. OBJECTIVE     Vital Signs:  BP (!) 144/96   Pulse 106   Temp 98.2 °F (36.8 °C) (Oral)   Resp 21   Ht 5' 11\" (1.803 m)   Wt 270 lb 4.5 oz (122.6 kg)   SpO2 97%   BMI 37.70 kg/m²     Temp (24hrs), Av.7 °F (37.1 °C), Min:97.8 °F (36.6 °C), Max:99.9 °F (37.7 °C)    In: 3359   Out: 2775 [Urine:]    Physical Exam:  Constitutional: This is a well developed, well nourished, 18.5-24.9 - Normal 56y.o. year old male who is alert, oriented, cooperative and in no apparent distress. Head:normocephalic and atraumatic. EENT:  PERRLA. No conjunctival injections. Septum was midline, mucosa was without erythema, exudates or cobblestoning. No thrush was noted. Neck: Supple without thyromegaly. No elevated JVP. Trachea was midline. Respiratory: Chest was symmetrical without dullness to percussion. Breath sounds bilaterally were clear to auscultation. There were no wheezes, rhonchi or rales. There is no intercostal retraction or use of accessory muscles. No egophony noted. Cardiovascular: Regular without murmur, clicks, gallops or rubs. Abdomen: Slightly rounded and soft without organomegaly. No rebound, rigidity or guarding was appreciated. Lymphatic: No lymphadenopathy. Musculoskeletal: Normal curvature of the spine. No gross muscle weakness. Extremities:  No lower extremity edema, ulcerations, tenderness, varicosities or erythema. Muscle size, tone and strength are normal.  No involuntary movements are noted. Skin:  Warm and dry. Good color, turgor and pigmentation. No lesions or scars.   No cyanosis or clubbing  Neurological/Psychiatric: The patient's general behavior, level of consciousness, thought content and emotional status is normal.        Medications:  Scheduled Medications:    sodium phosphate IVPB  30 mmol Intravenous Once    insulin glargine  22 Units Subcutaneous Nightly    lidocaine  1 patch Transdermal Daily    iron sucrose  200 mg Intravenous Q24H    acetaminophen  1,000 mg Oral Q8H    methocarbamol  750 mg Oral Q6H    lidocaine  2 patch Transdermal Daily    gabapentin  300 mg Oral Q8H    sodium chloride  20 mL Intravenous Once    metoprolol  5 mg Intravenous Q4H    insulin lispro  0-12 Units Subcutaneous TID WC    insulin lispro  0-6 Units Subcutaneous Nightly    fat emulsion  250 mL Intravenous Daily    famotidine (PEPCID) injection  20 mg Intravenous Daily    cefepime  1 g Intravenous Q12H    sodium chloride flush  10 mL Intravenous 2 times per day    sodium chloride flush  10 mL Intravenous 2 times per day    heparin (porcine)  5,000 Units Subcutaneous 3 times per day    metroNIDAZOLE  500 mg Intravenous Q8H    sodium chloride flush  10 mL Intravenous 2 times per day     Continuous Infusions:    PN-Adult 2-in-1 Central Line (Standard) 60 mL/hr at 09/05/20 1849    dextrose      sodium chloride 100 mL/hr at 09/04/20 0955     PRN MedicationsfentanNYL, 25 mcg, Q2H PRN  HYDROmorphone, 0.5 mg, Q3H PRN  oxyCODONE, 5 mg, Q4H PRN  metoprolol, 5 mg, Q6H PRN  glucose, 15 g, PRN  dextrose, 12.5 g, PRN  glucagon (rDNA), 1 mg, PRN  dextrose, 100 mL/hr, PRN  sodium chloride flush, 10 mL, PRN  sodium chloride flush, 10 mL, PRN  sodium chloride flush, 10 mL, PRN  promethazine, 12.5 mg, Q6H PRN    Or  ondansetron, 4 mg, Q6H PRN        Diagnostic Labs:  CBC:   Recent Labs     09/05/20  0553 09/05/20  1843 09/06/20  0603   WBC 15.8* 15.1* 14.7*   RBC 2.85* 2.99* 3.31*   HGB 7.9* 8.2* 9.3*   HCT 26.2* 26.9* 29.2*   MCV 91.9 90.0 88.2   RDW 13.2 13.8 14.2    301 297     BMP:   Recent Labs     09/04/20  0527 09/05/20  0553 09/06/20  0603    137 139   K 4.2 4.0 3.8   * 107 104   CO2 21 22 23   PHOS 2.3* 1.5* 2.1*   BUN 28* 30* 28*   CREATININE 1.80* 1.81* 1.74*     BNP: No results for input(s): BNP in the last 72 hours. PT/INR: No results for input(s): PROTIME, INR in the last 72 hours. APTT: No results for input(s): APTT in the last 72 hours. CARDIAC ENZYMES: No results for input(s): CKMB, CKMBINDEX, TROPONINI in the last 72 hours. Invalid input(s): CKTOTAL;3  FASTING LIPID PANEL:  Lab Results   Component Value Date    CHOL 131 06/22/2020    HDL 31 (L) 06/22/2020    TRIG 115 09/05/2020     LIVER PROFILE:   Recent Labs     09/03/20  1324 09/05/20  0553   AST 20 14   ALT 21 12   BILITOT 0.89 0.55   ALKPHOS 58 62      MICROBIOLOGY:   Lab Results   Component Value Date/Time    CULTURE NO GROWTH 4 DAYS 09/02/2020 04:49 AM       Imaging:    Ct Abdomen Pelvis Wo Contrast    Result Date: 9/1/2020  1. Proximal small-bowel obstruction due to extension of an infiltrative lower pole left renal mass to the mesentery and adjacent small bowel. There is marked gastric distension. 2. Infiltrative lower pole left renal mass, presumed malignant measuring 4.8 x 6.9 x 9.9 cm. Nonobstructive left nephrolithiasis. 3. Trace free pelvic fluid. Findings were discussed with Rock Dey at 3:37 pm on 8/31/2020. Xr Abdomen (kub) (single Ap View)    Addendum Date: 9/1/2020    ADDENDUM: Dose area product 1037.1 mGy per cm squared and fluoro time 5.6 seconds     Result Date: 9/1/2020  Please correlate with clinical service     Xr Acute Abd Series Chest 1 Vw    Result Date: 8/31/2020  No acute cardiopulmonary disease. 5 x 11 mm calcification, possibly a renal or proximal ureteral calculus. Nonspecific bowel-gas pattern.      Xr Chest Portable    Result Date: 9/3/2020  Line placement as above Expiratory exam with interval increase in now mild streaky bibasilar atelectasis     Xr Chest Portable    Result Date: 9/1/2020  No acute cardiopulmonary abnormality. Xr Abdomen For Ng/og/ne Tube Placement    Result Date: 9/2/2020  Enteric tube terminates at the GE junction. Recommend advancement prior to use. Xr Abdomen For Ng/og/ne Tube Placement    Result Date: 9/1/2020  The distal end of the nasogastric tube is in the mid gastric body. ASSESSMENT & PLAN     ASSESSMENT / PLAN:   IMPRESSION  This is a 57 y. o. male who presented with abdominal pain, vomiting and found to have small intestine obstruction along with left renal mass and right ureteric stone. Patient admitted to ICU where he underwent left nephrectomy through robotic surgery, exploratory laparatomy with bowel resection and ureteric stent placement.     Active Problems:    Small bowel obstruction ([address]  Plan: Patient had exploratory laparotomy bowel resection of proximal jejunum and descending colon with mobilization of splenic flexure and primary anastomosis of small bowel and colon along with placement of gastrostomy tube. Patient is currently allowed ice chips along with sips of water with medications.  General surgery are okay to start TPN.   Dietary consulted. Have pain at the surgery site. Tachycardia related to the pain.     Ureteric stone:  Patient had small right ureteric stone. Urology has seen the patient. Ureteric stent was with along with cystoscopy.     Renal cell carcinoma:  Patient had left renal cell carcinoma causing obstruction of the small intestine. Robotic left nephrectomy done.     Type 2 diabetes mellitus with uncontrolled blood sugars:  Patient had high blood sugars of 400s on the day of admission. He was on Lantus 10 units at nighttime blood sugars around 200s. We will increase the Lantus to 15 units at nighttime. He is also on medium dose sliding scale.     Acute on chronic CKD:  Patient had acute kidney injury due to dehydration. Patient got IV fluids.   The creatinine is now around the baseline.     Sepsis secondary to probably UTI:  Blood cultures and urine cultures showed no growth to date. Still have leukocytosis. Patient is currently on cefepime.     Essential hypertension:  Patient is on Lopressor 50 mg twice daily, lisinopril 20 mg daily, hydralazine 25 mg 3 times at home.  Resume as needed.     Acute blood loss anemia  Patient had low hemoglobin after surgery. 3 units of PRBC ordered per urology. Hemoglobin now is 9.4        DVT ppx : Heparin  GI ppx: Pepsid      PT/OT:   Discharge Planning / SW:     Gabby Sanchez MD  Internal Medicine Resident, PGY-1  [facility]; [address], [place]  9/6/2020, 12:53 PM    Attestation and add on       I have discussed the care of Kalman Osgood II , including pertinent history and exam findings,      9/6/20    with the resident. I have seen and examined the patient and the key elements of all parts of the encounter have been performed by me . I agree with the assessment, plan and orders as documented by the resident. Active Problems:    Small bowel obstruction (HCC)  Resolved Problems:    * No resolved hospital problems. *            ---- ;        Medications: Allergies: Allergies   Allergen Reactions    Ampicillin Swelling     Swelling of throat.  Pcn [Penicillins] Swelling     Throat swelling. Tolerated cefepime during 8/31/20 admission.     Tape Gae Chalet Tape]        Current Meds:   Scheduled Meds:    sodium phosphate IVPB  30 mmol Intravenous Once    insulin glargine  22 Units Subcutaneous Nightly    lidocaine  1 patch Transdermal Daily    iron sucrose  200 mg Intravenous Q24H    acetaminophen  1,000 mg Oral Q8H    methocarbamol  750 mg Oral Q6H    lidocaine  2 patch Transdermal Daily    gabapentin  300 mg Oral Q8H    sodium chloride  20 mL Intravenous Once    metoprolol  5 mg Intravenous Q4H    insulin lispro  0-12 Units Subcutaneous TID WC    insulin lispro  0-6 Units Subcutaneous Nightly    fat emulsion  250 mL Intravenous Daily    famotidine (PEPCID) injection  20 mg Intravenous Daily    cefepime  1 g Intravenous Q12H    sodium chloride flush  10 mL Intravenous 2 times per day    sodium chloride flush  10 mL Intravenous 2 times per day    heparin (porcine)  5,000 Units Subcutaneous 3 times per day    metroNIDAZOLE  500 mg Intravenous Q8H    sodium chloride flush  10 mL Intravenous 2 times per day     Continuous Infusions:    PN-Adult 2-in-1 Central Line (Standard) 60 mL/hr at 09/05/20 1849    dextrose      sodium chloride 100 mL/hr at 09/04/20 0955     PRN Meds: fentanNYL, HYDROmorphone, oxyCODONE, metoprolol, glucose, dextrose, glucagon (rDNA), dextrose, sodium chloride flush, sodium chloride flush, sodium chloride flush, promethazine **OR** ondansetron        [facility] [address], [address] [place] [address].    Phone [phone]   Fax: [phone]  Answering Service: [phone]

## 2020-09-07 ENCOUNTER — APPOINTMENT (OUTPATIENT)
Dept: GENERAL RADIOLOGY | Age: 60
DRG: 853 | End: 2020-09-07
Attending: INTERNAL MEDICINE
Payer: COMMERCIAL

## 2020-09-07 ENCOUNTER — APPOINTMENT (OUTPATIENT)
Dept: CT IMAGING | Age: 60
DRG: 853 | End: 2020-09-07
Attending: INTERNAL MEDICINE
Payer: COMMERCIAL

## 2020-09-07 PROBLEM — E43 SEVERE MALNUTRITION (HCC): Status: ACTIVE | Noted: 2020-09-07

## 2020-09-07 LAB
ABSOLUTE EOS #: 0.59 K/UL (ref 0–0.44)
ABSOLUTE IMMATURE GRANULOCYTE: 0.79 K/UL (ref 0–0.3)
ABSOLUTE LYMPH #: 1.19 K/UL (ref 1.1–3.7)
ABSOLUTE MONO #: 1.39 K/UL (ref 0.1–1.2)
ANION GAP SERPL CALCULATED.3IONS-SCNC: 11 MMOL/L (ref 9–17)
BASOPHILS # BLD: 1 % (ref 0–2)
BASOPHILS ABSOLUTE: 0.2 K/UL (ref 0–0.2)
BUN BLDV-MCNC: 31 MG/DL (ref 8–23)
BUN/CREAT BLD: ABNORMAL (ref 9–20)
CALCIUM SERPL-MCNC: 8.3 MG/DL (ref 8.6–10.4)
CHLORIDE BLD-SCNC: 105 MMOL/L (ref 98–107)
CO2: 19 MMOL/L (ref 20–31)
CREAT SERPL-MCNC: 1.55 MG/DL (ref 0.7–1.2)
DIFFERENTIAL TYPE: ABNORMAL
EOSINOPHILS RELATIVE PERCENT: 3 % (ref 1–4)
ESTIMATED AVERAGE GLUCOSE: 280 MG/DL
GFR AFRICAN AMERICAN: 55 ML/MIN
GFR NON-AFRICAN AMERICAN: 46 ML/MIN
GFR SERPL CREATININE-BSD FRML MDRD: ABNORMAL ML/MIN/{1.73_M2}
GFR SERPL CREATININE-BSD FRML MDRD: ABNORMAL ML/MIN/{1.73_M2}
GLUCOSE BLD-MCNC: 232 MG/DL (ref 75–110)
GLUCOSE BLD-MCNC: 270 MG/DL (ref 75–110)
GLUCOSE BLD-MCNC: 292 MG/DL (ref 75–110)
GLUCOSE BLD-MCNC: 297 MG/DL (ref 70–99)
HBA1C MFR BLD: 11.4 % (ref 4–6)
HCT VFR BLD CALC: 34.9 % (ref 40.7–50.3)
HEMOGLOBIN: 10.4 G/DL (ref 13–17)
IMMATURE GRANULOCYTES: 4 %
LYMPHOCYTES # BLD: 6 % (ref 24–43)
MAGNESIUM: 2 MG/DL (ref 1.6–2.6)
MCH RBC QN AUTO: 26.9 PG (ref 25.2–33.5)
MCHC RBC AUTO-ENTMCNC: 29.8 G/DL (ref 28.4–34.8)
MCV RBC AUTO: 90.2 FL (ref 82.6–102.9)
MONOCYTES # BLD: 7 % (ref 3–12)
MORPHOLOGY: NORMAL
NRBC AUTOMATED: 0.1 PER 100 WBC
PDW BLD-RTO: 14.2 % (ref 11.8–14.4)
PHOSPHORUS: 2.1 MG/DL (ref 2.5–4.5)
PLATELET # BLD: 337 K/UL (ref 138–453)
PLATELET ESTIMATE: ABNORMAL
PMV BLD AUTO: 10.6 FL (ref 8.1–13.5)
POTASSIUM SERPL-SCNC: 4.2 MMOL/L (ref 3.7–5.3)
RBC # BLD: 3.87 M/UL (ref 4.21–5.77)
RBC # BLD: ABNORMAL 10*6/UL
SEG NEUTROPHILS: 79 % (ref 36–65)
SEGMENTED NEUTROPHILS ABSOLUTE COUNT: 15.64 K/UL (ref 1.5–8.1)
SODIUM BLD-SCNC: 135 MMOL/L (ref 135–144)
SURGICAL PATHOLOGY REPORT: NORMAL
WBC # BLD: 19.8 K/UL (ref 3.5–11.3)
WBC # BLD: ABNORMAL 10*3/UL

## 2020-09-07 PROCEDURE — 74176 CT ABD & PELVIS W/O CONTRAST: CPT

## 2020-09-07 PROCEDURE — 6360000004 HC RX CONTRAST MEDICATION: Performed by: SURGERY

## 2020-09-07 PROCEDURE — 85025 COMPLETE CBC W/AUTO DIFF WBC: CPT

## 2020-09-07 PROCEDURE — 2580000003 HC RX 258: Performed by: STUDENT IN AN ORGANIZED HEALTH CARE EDUCATION/TRAINING PROGRAM

## 2020-09-07 PROCEDURE — 2500000003 HC RX 250 WO HCPCS: Performed by: STUDENT IN AN ORGANIZED HEALTH CARE EDUCATION/TRAINING PROGRAM

## 2020-09-07 PROCEDURE — 6360000002 HC RX W HCPCS: Performed by: STUDENT IN AN ORGANIZED HEALTH CARE EDUCATION/TRAINING PROGRAM

## 2020-09-07 PROCEDURE — 84100 ASSAY OF PHOSPHORUS: CPT

## 2020-09-07 PROCEDURE — 83735 ASSAY OF MAGNESIUM: CPT

## 2020-09-07 PROCEDURE — 36415 COLL VENOUS BLD VENIPUNCTURE: CPT

## 2020-09-07 PROCEDURE — 6370000000 HC RX 637 (ALT 250 FOR IP): Performed by: SURGERY

## 2020-09-07 PROCEDURE — 6370000000 HC RX 637 (ALT 250 FOR IP): Performed by: STUDENT IN AN ORGANIZED HEALTH CARE EDUCATION/TRAINING PROGRAM

## 2020-09-07 PROCEDURE — 82947 ASSAY GLUCOSE BLOOD QUANT: CPT

## 2020-09-07 PROCEDURE — 2060000000 HC ICU INTERMEDIATE R&B

## 2020-09-07 PROCEDURE — 74019 RADEX ABDOMEN 2 VIEWS: CPT

## 2020-09-07 PROCEDURE — 99232 SBSQ HOSP IP/OBS MODERATE 35: CPT | Performed by: INTERNAL MEDICINE

## 2020-09-07 PROCEDURE — 80048 BASIC METABOLIC PNL TOTAL CA: CPT

## 2020-09-07 RX ORDER — INSULIN GLARGINE 100 [IU]/ML
35 INJECTION, SOLUTION SUBCUTANEOUS NIGHTLY
Status: DISCONTINUED | OUTPATIENT
Start: 2020-09-07 | End: 2020-09-07

## 2020-09-07 RX ORDER — INSULIN GLARGINE 100 [IU]/ML
30 INJECTION, SOLUTION SUBCUTANEOUS NIGHTLY
Status: DISCONTINUED | OUTPATIENT
Start: 2020-09-07 | End: 2020-09-08

## 2020-09-07 RX ORDER — INSULIN GLARGINE 100 [IU]/ML
30 INJECTION, SOLUTION SUBCUTANEOUS NIGHTLY
Status: DISCONTINUED | OUTPATIENT
Start: 2020-09-07 | End: 2020-09-07

## 2020-09-07 RX ADMIN — METOPROLOL TARTRATE 5 MG: 1 INJECTION, SOLUTION INTRAVENOUS at 06:03

## 2020-09-07 RX ADMIN — SODIUM CHLORIDE 200 MG: 9 INJECTION, SOLUTION INTRAVENOUS at 08:44

## 2020-09-07 RX ADMIN — HEPARIN SODIUM 5000 UNITS: 5000 INJECTION INTRAVENOUS; SUBCUTANEOUS at 06:06

## 2020-09-07 RX ADMIN — SODIUM CHLORIDE: 9 INJECTION, SOLUTION INTRAVENOUS at 12:51

## 2020-09-07 RX ADMIN — METRONIDAZOLE 500 MG: 500 INJECTION, SOLUTION INTRAVENOUS at 10:15

## 2020-09-07 RX ADMIN — HEPARIN SODIUM 5000 UNITS: 5000 INJECTION INTRAVENOUS; SUBCUTANEOUS at 22:08

## 2020-09-07 RX ADMIN — METOPROLOL TARTRATE 5 MG: 1 INJECTION, SOLUTION INTRAVENOUS at 22:08

## 2020-09-07 RX ADMIN — INSULIN LISPRO 6 UNITS: 100 INJECTION, SOLUTION INTRAVENOUS; SUBCUTANEOUS at 13:34

## 2020-09-07 RX ADMIN — FAMOTIDINE 20 MG: 10 INJECTION INTRAVENOUS at 08:55

## 2020-09-07 RX ADMIN — METOPROLOL TARTRATE 5 MG: 1 INJECTION, SOLUTION INTRAVENOUS at 00:18

## 2020-09-07 RX ADMIN — Medication 10 ML: at 08:44

## 2020-09-07 RX ADMIN — METOPROLOL TARTRATE 5 MG: 1 INJECTION, SOLUTION INTRAVENOUS at 02:30

## 2020-09-07 RX ADMIN — ACETAMINOPHEN 1000 MG: 500 TABLET ORAL at 06:02

## 2020-09-07 RX ADMIN — FENTANYL CITRATE 25 MCG: 50 INJECTION, SOLUTION INTRAMUSCULAR; INTRAVENOUS at 12:51

## 2020-09-07 RX ADMIN — I.V. FAT EMULSION 250 ML: 20 EMULSION INTRAVENOUS at 18:13

## 2020-09-07 RX ADMIN — HYDROMORPHONE HYDROCHLORIDE 0.5 MG: 1 INJECTION, SOLUTION INTRAMUSCULAR; INTRAVENOUS; SUBCUTANEOUS at 03:22

## 2020-09-07 RX ADMIN — POTASSIUM CHLORIDE: 2 INJECTION, SOLUTION, CONCENTRATE INTRAVENOUS at 18:11

## 2020-09-07 RX ADMIN — SODIUM PHOSPHATE, MONOBASIC, MONOHYDRATE 30 MMOL: 276; 142 INJECTION, SOLUTION INTRAVENOUS at 15:55

## 2020-09-07 RX ADMIN — METOPROLOL TARTRATE 5 MG: 1 INJECTION, SOLUTION INTRAVENOUS at 13:32

## 2020-09-07 RX ADMIN — METOPROLOL TARTRATE 5 MG: 1 INJECTION, SOLUTION INTRAVENOUS at 18:13

## 2020-09-07 RX ADMIN — SODIUM CHLORIDE, PRESERVATIVE FREE 10 ML: 5 INJECTION INTRAVENOUS at 08:45

## 2020-09-07 RX ADMIN — SODIUM CHLORIDE: 9 INJECTION, SOLUTION INTRAVENOUS at 22:09

## 2020-09-07 RX ADMIN — FENTANYL CITRATE 25 MCG: 50 INJECTION, SOLUTION INTRAMUSCULAR; INTRAVENOUS at 20:55

## 2020-09-07 RX ADMIN — HYDROMORPHONE HYDROCHLORIDE 0.5 MG: 1 INJECTION, SOLUTION INTRAMUSCULAR; INTRAVENOUS; SUBCUTANEOUS at 22:08

## 2020-09-07 RX ADMIN — HYDROMORPHONE HYDROCHLORIDE 0.5 MG: 1 INJECTION, SOLUTION INTRAMUSCULAR; INTRAVENOUS; SUBCUTANEOUS at 15:11

## 2020-09-07 RX ADMIN — METOPROLOL TARTRATE 5 MG: 1 INJECTION, SOLUTION INTRAVENOUS at 10:15

## 2020-09-07 RX ADMIN — METHOCARBAMOL TABLETS 750 MG: 750 TABLET, COATED ORAL at 06:02

## 2020-09-07 RX ADMIN — METRONIDAZOLE 500 MG: 500 INJECTION, SOLUTION INTRAVENOUS at 02:30

## 2020-09-07 RX ADMIN — METRONIDAZOLE 500 MG: 500 INJECTION, SOLUTION INTRAVENOUS at 18:13

## 2020-09-07 RX ADMIN — HYDROMORPHONE HYDROCHLORIDE 0.5 MG: 1 INJECTION, SOLUTION INTRAMUSCULAR; INTRAVENOUS; SUBCUTANEOUS at 08:43

## 2020-09-07 RX ADMIN — INSULIN LISPRO 9 UNITS: 100 INJECTION, SOLUTION INTRAVENOUS; SUBCUTANEOUS at 20:57

## 2020-09-07 RX ADMIN — SODIUM CHLORIDE, PRESERVATIVE FREE 10 ML: 5 INJECTION INTRAVENOUS at 08:44

## 2020-09-07 RX ADMIN — FENTANYL CITRATE 25 MCG: 50 INJECTION, SOLUTION INTRAMUSCULAR; INTRAVENOUS at 18:31

## 2020-09-07 RX ADMIN — CEFEPIME HYDROCHLORIDE 1 G: 1 INJECTION, POWDER, FOR SOLUTION INTRAMUSCULAR; INTRAVENOUS at 03:22

## 2020-09-07 RX ADMIN — CEFEPIME HYDROCHLORIDE 1 G: 1 INJECTION, POWDER, FOR SOLUTION INTRAMUSCULAR; INTRAVENOUS at 15:11

## 2020-09-07 RX ADMIN — IOHEXOL 50 ML: 240 INJECTION, SOLUTION INTRATHECAL; INTRAVASCULAR; INTRAVENOUS; ORAL at 08:44

## 2020-09-07 RX ADMIN — GABAPENTIN 300 MG: 300 CAPSULE ORAL at 06:02

## 2020-09-07 RX ADMIN — IOHEXOL 50 ML: 350 INJECTION, SOLUTION INTRAVENOUS at 12:00

## 2020-09-07 RX ADMIN — INSULIN GLARGINE 30 UNITS: 100 INJECTION, SOLUTION SUBCUTANEOUS at 20:57

## 2020-09-07 RX ADMIN — INSULIN LISPRO 6 UNITS: 100 INJECTION, SOLUTION INTRAVENOUS; SUBCUTANEOUS at 06:06

## 2020-09-07 RX ADMIN — INSULIN LISPRO 9 UNITS: 100 INJECTION, SOLUTION INTRAVENOUS; SUBCUTANEOUS at 08:50

## 2020-09-07 RX ADMIN — HEPARIN SODIUM 5000 UNITS: 5000 INJECTION INTRAVENOUS; SUBCUTANEOUS at 13:34

## 2020-09-07 ASSESSMENT — PAIN SCALES - GENERAL
PAINLEVEL_OUTOF10: 7
PAINLEVEL_OUTOF10: 3
PAINLEVEL_OUTOF10: 4
PAINLEVEL_OUTOF10: 8
PAINLEVEL_OUTOF10: 4
PAINLEVEL_OUTOF10: 7
PAINLEVEL_OUTOF10: 4
PAINLEVEL_OUTOF10: 8
PAINLEVEL_OUTOF10: 3
PAINLEVEL_OUTOF10: 8

## 2020-09-07 ASSESSMENT — PAIN DESCRIPTION - ORIENTATION
ORIENTATION: MID
ORIENTATION: MID

## 2020-09-07 ASSESSMENT — PAIN DESCRIPTION - PAIN TYPE
TYPE: SURGICAL PAIN
TYPE: SURGICAL PAIN

## 2020-09-07 ASSESSMENT — PAIN DESCRIPTION - LOCATION
LOCATION: ABDOMEN
LOCATION: ABDOMEN

## 2020-09-07 NOTE — PLAN OF CARE
Problem: Skin Integrity:  Goal: Will show no infection signs and symptoms  Description: Will show no infection signs and symptoms  Outcome: Ongoing  Goal: Absence of new skin breakdown  Description: Absence of new skin breakdown  Outcome: Ongoing     Problem: Falls - Risk of:  Goal: Will remain free from falls  Description: Will remain free from falls  Outcome: Met This Shift  Goal: Absence of physical injury  Description: Absence of physical injury  Outcome: Met This Shift     Problem: Discharge Planning:  Goal: Discharged to appropriate level of care  Description: Discharged to appropriate level of care  Outcome: Ongoing     Problem: Infection, Septic Shock:  Goal: Will show no infection signs and symptoms  Description: Will show no infection signs and symptoms  Outcome: Ongoing     Problem: Serum Glucose Level - Abnormal:  Goal: Ability to maintain appropriate glucose levels will improve  Description: Ability to maintain appropriate glucose levels will improve  9/7/2020 1400 by Cali Rao RN  Outcome: Ongoing     Problem: Pain:  Goal: Pain level will decrease  Description: Pain level will decrease  9/7/2020 1400 by Cali Rao RN  Outcome: Ongoing    Goal: Control of acute pain  Description: Control of acute pain  9/7/2020 1400 by Cali Rao RN  Outcome: Ongoing    Goal: Control of chronic pain  Description: Control of chronic pain  9/7/2020 1400 by Cali Rao RN  Outcome: Ongoing

## 2020-09-07 NOTE — CARE COORDINATION
Met with patient and wife to discuss transitional planning. Plan is home with HC vs. SNF.   Given SNF and [facility]. lists for review and freedom of choice

## 2020-09-07 NOTE — PLAN OF CARE
Problem: Serum Glucose Level - Abnormal:  Goal: Ability to maintain appropriate glucose levels will improve  Description: Ability to maintain appropriate glucose levels will improve  Outcome: Ongoing     Problem: Nutrition  Goal: Optimal nutrition therapy  Description: Nutrition Problem #1: Inadequate oral intake  Intervention: Food and/or Nutrient Delivery: Continue NPO. Start nutrition as able; Monitor plans re:PICC placement/ initiation of parenteral nutrition support.   Nutritional Goals: meet % of estimated nutrition needs     Outcome: Ongoing     Problem: Pain:  Goal: Pain level will decrease  Description: Pain level will decrease  Outcome: Ongoing     Problem: Pain:  Goal: Control of acute pain  Description: Control of acute pain  Outcome: Ongoing     Problem: Pain:  Goal: Control of chronic pain  Description: Control of chronic pain  Outcome: Ongoing   Electronically signed by Wendy Galindo RN on 9/7/2020 at 2:04 AM

## 2020-09-07 NOTE — PROGRESS NOTES
Medications   Medication Dose Route Frequency Provider Last Rate Last Dose    HYDROmorphone (DILAUDID) injection 0.5 mg  0.5 mg Intravenous Q6H PRN Alphua Benitezing, DO        insulin glargine (LANTUS) injection vial 30 Units  30 Units Subcutaneous Nightly Corona Santana, DO        insulin lispro (HUMALOG) injection vial 0-18 Units  0-18 Units Subcutaneous Q6H Corona Santana, DO        insulin lispro (HUMALOG) injection vial 0-9 Units  0-9 Units Subcutaneous Nightly Corona Santana, DO        PN-Adult 2-in-1 Central Line (Standard)   Intravenous Continuous TPN Alphua Vicky, DO 65 mL/hr at 09/06/20 1740      glucose (GLUTOSE) 40 % oral gel 15 g  15 g Oral PRN Emmanuel Curet Soren, DO        dextrose 50 % IV solution  12.5 g Intravenous PRN Emmanuel Curet Soren, DO        glucagon (rDNA) injection 1 mg  1 mg Intramuscular PRN Jagjit SAHRA Soren, DO        dextrose 5 % solution  100 mL/hr Intravenous PRN Emmanuel Curet Soren, DO        fentaNYL (SUBLIMAZE) injection 25 mcg  25 mcg Intravenous Q2H PRN Ismael Carvajal MD   25 mcg at 09/06/20 1036    iron sucrose (VENOFER) 200 mg in sodium chloride 0.9 % 100 mL IVPB  200 mg Intravenous Q24H Sara Holley MD   Stopped at 09/06/20 1110    acetaminophen (TYLENOL) tablet 1,000 mg  1,000 mg Oral Q8H Alphua Benitezing, DO   1,000 mg at 09/07/20 0602    methocarbamol (ROBAXIN) tablet 750 mg  750 mg Oral Q6H Alphua Benitezing, DO   750 mg at 09/07/20 0602    gabapentin (NEURONTIN) capsule 300 mg  300 mg Oral Q8H Alonna Messing, DO   300 mg at 09/07/20 0602    oxyCODONE (ROXICODONE) immediate release tablet 5 mg  5 mg Oral Q4H PRN Celi Perry, DO   5 mg at 09/06/20 0740    0.9 % sodium chloride bolus  20 mL Intravenous Once Deangelo Sharif MD   Stopped at 09/04/20 0836    metoprolol (LOPRESSOR) injection 5 mg  5 mg Intravenous Q4H Matthew Hyatt MD   5 mg at 09/07/20 0603    fat emulsion 20 % infusion 250 mL  250 mL Intravenous Daily Celi Perry DO   Stopped at 09/07/20 3309 growth from 9/2  UCx no growth from 9/1    Physical Exam:   NAD  AOx3  Peripheral pulses palpable  Respirations nonlabored, symmetric chest rise bilaterally, on nasal cannula oxygen  Soft, appropriately tender, tympanic to percussion /midline incisions with gaps between staples, no expressible fluid.  Other incisions c/d/i  No calf ttp bilaterally  EPC cuffs on and functioning billaterally      Interval Imaging Findings:    Impression:    POD 6 from  Laparoscopic robotic nephrectomy and exploratory laparotomy with bowel resection of proximal jejunum and descending colon  - Acute blood loss anemia   - small bowel obstruction likely secondary to mesenteric invasion of tumor  - acute kidney injury on CKD stage IIIA (baseline Cr 1.4)  - right distal ureteral stone s/p cystoscopy and right ureteral stent placement by Dr Concepcion Jamison  - platelet dysfunction on aspirin 81 mg last dose 8/28/20  - comorbid conditions: diabetes mellitus type 2, HTN, obesity and chronic kidney disease.     Plan:   Diet:  Patient did not tolerate G-tube clamping and it was connected to gravity today a.m. / await return of bowel function   IV fluids : at 100 ml/  Hour  Antibiotics: On cefepime  and Flagyl  Leukocytosis worsening, 19.8 from 14.7 yesterday -will discuss with attending about CT abdomen and pelvis  Hemoglobin from today morning 10.4  DEBBI resolving, creatinine 1.5 from 1.7 yesterday  Hep 5000 U q8 hrs   Pain control:  As needed Roxicodone and Dilaudid  Ambulation / IS 10 times per hour       Ronal Duckworth MD  Urology Resident, PGY-3  8:08 AM 9/7/2020

## 2020-09-07 NOTE — PROGRESS NOTES
XRAY VIEW(S) OF THE ABDOMEN 9/1/2020 9:32 pm COMPARISON: 16 hours ago HISTORY: ORDERING SYSTEM PROVIDED HISTORY: cysto with right stent TECHNOLOGIST PROVIDED HISTORY: cysto with right stent Reason for Exam: cysto Acuity: Acute Type of Exam: Initial FINDINGS: Double-J ureteral stent present. On the contralateral side there appears to be a nephrolith. Please correlate with clinical service     Xr Chest Portable    Result Date: 9/1/2020  EXAMINATION: ONE XRAY VIEW OF THE CHEST 9/1/2020 8:31 pm COMPARISON: 05/14/2010 HISTORY: ORDERING SYSTEM PROVIDED HISTORY: evaluation TECHNOLOGIST PROVIDED HISTORY: evaluation Reason for Exam: evaluation Acuity: Acute Type of Exam: Initial FINDINGS: Cardiomediastinal silhouette is unchanged in size. No pulmonary consolidation, pleural effusion, or pneumothorax. No acute osseous abnormality. Enteric tube is below the diaphragm with tip outside the field of view. No acute cardiopulmonary abnormality. Xr Abdomen For Ng/og/ne Tube Placement    Result Date: 9/1/2020  EXAMINATION: ONE SUPINE XRAY VIEW(S) OF THE ABDOMEN 9/1/2020 6:26 am COMPARISON: 08/31/2020 HISTORY: ORDERING SYSTEM PROVIDED HISTORY: Confirmation of course of NG/OG/NE tube and location of tip of tube TECHNOLOGIST PROVIDED HISTORY: Confirmation of course of NG/OG/NE tube and location of tip of tube Portable? ->Yes Reason for Exam: ng placement Acuity: Unknown Type of Exam: Unknown FINDINGS: The nasogastric tube proximal side port is in the proximal stomach. The distal tip is in the mid gastric body. There is no evidence of bowel obstruction. There is a stable left renal calculus. No acute osseous abnormality is seen. The distal end of the nasogastric tube is in the mid gastric body. Fluoro For Surgical Procedures    Result Date: 9/1/2020  Radiology exam is complete. No Radiologist dictation. Please follow up with ordering provider.        ASSESSMENT:  Active Hospital Problems    Diagnosis Date Noted  Small bowel obstruction ([facility] [D77.221] 09/01/2020       1. 56 y.o. male with bowel obstruction secondary to renal mass  2. POD#6 R nephrectomy, SB resection with primary anastomosis, left partial colectomy with primary anastomosis, open gastrectomy tube    Plan:  1. Continue medical management and supportive care per primary team  2. \Unclamp G-tube and leave to gravity  3. Continue tpn  4. NPO, until return of bowel function. 5. ABx per primary: Cefepime, Flagyl  6. Repeat cbc   7. [id number] Silva Kessler for meds via g-tube and clamp for 30 min after. Pt abdomen appears slightly more distended today, he is belching more and does not have return of bowel function. At this time pt most likely with ileus. Will continue conservative management with g tube to gravity, NPO, get KUB, and continue TPN for nutrition. Patients blood glucose > 200 for last 48 hours, recommend insulin gtt at this time to maintain BG < 180. WBC returned increased now 19K. Will discuss possible imaging CT abd/pelv with PO contrast to examine for leak.        Electronically signed by Geovani Leavitt DO  on 9/7/2020 at 6:29 AM

## 2020-09-07 NOTE — PROGRESS NOTES
Attestation and add on       I have discussed the care of Florentino Cheng II , including pertinent history and exam findings,    9/7/20   with the resident. I have seen and examined the patient and the key elements of all parts of the encounter have been performed by me . I agree with the assessment, plan and orders as documented by the resident. Active Problems:    Small bowel obstruction (HCC)  Resolved Problems:    * No resolved hospital problems. *        -   CLINICAL COURSE ---          clinical course has improved,    -     uroloogy input; Impression:    POD 6 from  Laparoscopic robotic nephrectomy and exploratory laparotomy with bowel resection of proximal jejunum and descending colon  - Acute blood loss anemia   - small bowel obstruction likely secondary to mesenteric invasion of tumor  - acute kidney injury on CKD stage IIIA (baseline Cr 1.4)  - right distal ureteral stone s/p cystoscopy and right ureteral stent placement by Dr Felton Stubbs  - platelet dysfunction on aspirin 81 mg last dose 8/28/20  - comorbid conditions: diabetes mellitus type 2, HTN, obesity and chronic kidney disease.     Plan:   Diet:  Patient did not tolerate G-tube clamping and it was connected to gravity today a.m. / await return of bowel function   IV fluids : at 100 ml/  Hour  Antibiotics: On cefepime  and Flagyl                  Medications: Allergies: Allergies   Allergen Reactions    Ampicillin Swelling     Swelling of throat.  Pcn [Penicillins] Swelling     Throat swelling. Tolerated cefepime during 8/31/20 admission.     Tape Angelita Ends Tape]        Current Meds:   Scheduled Meds:    insulin lispro  0-18 Units Subcutaneous Q6H    insulin lispro  0-9 Units Subcutaneous Nightly    insulin glargine  30 Units Subcutaneous Nightly    acetaminophen  1,000 mg Oral Q8H    methocarbamol  750 mg Oral Q6H    gabapentin  300 mg Oral Q8H    sodium chloride  20 mL Intravenous Once    metoprolol  5 mg Intravenous Q4H    fat emulsion  250 mL Intravenous Daily    famotidine (PEPCID) injection  20 mg Intravenous Daily    cefepime  1 g Intravenous Q12H    sodium chloride flush  10 mL Intravenous 2 times per day    sodium chloride flush  10 mL Intravenous 2 times per day    heparin (porcine)  5,000 Units Subcutaneous 3 times per day    metroNIDAZOLE  500 mg Intravenous Q8H    sodium chloride flush  10 mL Intravenous 2 times per day     Continuous Infusions:    PN-Adult 2-in-1 Central Line (Standard) 65 mL/hr at 09/06/20 1740    dextrose      dextrose      sodium chloride 100 mL/hr at 09/04/20 0955     PRN Meds: HYDROmorphone, glucose, dextrose, glucagon (rDNA), dextrose, fentanNYL, oxyCODONE, metoprolol, glucose, dextrose, glucagon (rDNA), dextrose, sodium chloride flush, sodium chloride flush, sodium chloride flush, promethazine **OR** ondansetron      ---- ;     [address], [address] [place] [address].    Phone [phone]   Fax: [phone]  Answering Service: [phone]

## 2020-09-07 NOTE — PROGRESS NOTES
Comprehensive Nutrition Assessment    Type and Reason for Visit:  Reassess    Nutrition Recommendations/Plan:   -Continue NPO status   -Recommend increasing PN 2-in-1 central custom @ 65 mL/hr + 220 gms dextrose + 95 gms AA + 20% of 250 mL IV lipids ~> 1628 kcals, 95 gms protein   -Recommend increasing NaAcetate and KPhos in next bag   -Continue 15 units of insulin in TPN bag   -Will continue to monitor TPN, labs and weights     Nutrition Assessment:  TPN to continue. Pt remains NPO. G-tube to gravity/awiting return of bowel fx. Glucose remains elevated 292-321 mg/dL. Will continue to monitor.      Malnutrition Assessment:  Malnutrition Status:  Severe malnutrition    Context:  Chronic Illness     Findings of the 6 clinical characteristics of malnutrition:  Energy Intake:  7 - 75% or less estimated energy requirements for 1 month or longer  Weight Loss:  7 - Greater than 10% over 6 months     Body Fat Loss:  No significant body fat loss     Muscle Mass Loss:  No significant muscle mass loss    Fluid Accumulation:  No significant fluid accumulation     Strength:  Not Performed    Estimated Daily Nutrient Needs:  Energy (kcal):  3634-2095 kcal/day; Weight Used for Energy Requirements:  Ideal     Protein (g):  95 g pro/day; Weight Used for Protein Requirements:  Ideal(1.2)          Nutrition Related Findings:  BM 9/1; glucose 297, Phos 2.1      Wounds:  Surgical Wound       Current Nutrition Therapies:    PN-Adult 2-in-1 Central Line (Standard)  Diet NPO Effective Now  Current Parenteral Nutrition Orders:  · Type and Formula: 2-in-1 Custom   · Lipids: 250ml, Daily   · Rate: 65 mL/hr   · Duration: Continuous  · Rate/Volume: 65 mL/hr; 1560 mLs/d  · Current PN Order Provides: 200 gms dextrose + 95 gms AA ~> 1560 kcals, 95 gms protein  · Goal PN Orders Provides: 220 gms dextrose + 95 gms AA ~> 1628 kcals, 95 gms protein      Anthropometric Measures:  · Height: 5' 11\" (180.3 cm)  · Current Body Weight: 275 lb (124.7 kg)   · Admission Body Weight: 257 lb (116.6 kg)    · Usual Body Weight: 294 lb (133.4 kg)(3/16/20 per EHR)     · Ideal Body Weight: 172 lbs; % Ideal Body Weight 159.9 %   · BMI: 38.4  · BMI Categories: Obese Class 2 (BMI 35.0 -39.9)       Nutrition Diagnosis:   · Inadequate oral intake related to altered GI function as evidenced by NPO or clear liquid status due to medical condition, nutrition support - parenteral nutrition      Nutrition Interventions:   Food and/or Nutrient Delivery:  Modify Parenteral Nutrition, Continue NPO  Nutrition Education/Counseling:  Education completed(discussed PN with pt's wife)   Coordination of Nutrition Care:  Continued Inpatient Monitoring    Goals:  meet % of estimated nutrition needs     Progressing towards goals     Nutrition Monitoring and Evaluation:   Food/Nutrient Intake Outcomes:  Parenteral Nutrition Intake/Tolerance  Physical Signs/Symptoms Outcomes:  Biochemical Data, Skin, Weight, GI Status, Hemodynamic Status     Discharge Planning:     Too soon to determine     Electronically signed by Yany Cerda RD, LD on 9/7/20 at 11:13 AM EDT    Contact: [phone]

## 2020-09-07 NOTE — PROGRESS NOTES
[facility]  Internal Medicine Teaching Residency Program  Inpatient Daily Progress Note  ______________________________________________________________________________    Patient: Sharyn Vega II  YOB: 1960   TSU:[id number]    Acct: [de-identified]     Room: [address] [place] [address]  Admit date: 9/1/2020  Today's date: 09/07/20  Number of days in the hospital: 6    SUBJECTIVE   Admitting Diagnosis: Renal cell carcinoma, small intestine obstruction  CC: abdominal pain  Nausea and vomiting   Pt examined at bedside. Chart & results reviewed. Patient had no acute events overnight. Blood pressure is 159/103. Patient abdomen appears slightly more distended today and he had more belching and he still does not have return of the bowel function. Patient seen by the surgery resident and advised NPO, get KUB and continue TPN for nutrition. ROS:  Constitutional:  negative for chills, fevers, sweats  Respiratory:  negative for cough, dyspnea on exertion, hemoptysis, shortness of breath, wheezing  Cardiovascular:  negative for chest pain, chest pressure/discomfort, lower extremity edema, palpitations  Gastrointestinal: Positive for abdominal pain at the surgical site and abdominal distention. No nausea vomiting or diarrhea. Neurological:  negative for dizziness, headache  BRIEF HISTORY   Patient originally presented to [facility] on 08/31 with symptoms of abdominal distention, abdominal pain, nausea emesis and inability to tolerate p.o. diet found to be severely dehydrated with acute kidney injury on CKD, elevated blood glucose in 400, leukocytosis 20,000 and tachycardic.  CT abdomen pelvis was completed which demonstrated left renal mass 4.8 x 6.9 x 9.9 cm eroding into and causing small bowel obstruction.    On the CT abdomen patient was also noted to have a right ureteral stone 5 x 11 mm.   Patient was admitted to the ICU and general surgery was consulted along with urology at [address] was decompressed with NG tube, placed on IV fluids and insulin drip. Pt was also started on cefepime and flagyl for empiric sepsis coverage. Prior to transfer to [facility] this morning patient received cystoscopy and right ureteral stent.   During ICU stay patient underwent laparoscopic robotic nephrectomy and   Exploratory laparotomy bowel resection of proximal jejunum and descending colon with mobilization of splenic flexure and primary anastomosis of small bowel and colon along with placement of gastrostomy tub    OBJECTIVE     Vital Signs:  BP (!) 159/103   Pulse 111   Temp 98.6 °F (37 °C) (Oral)   Resp 22   Ht 5' 11\" (1.803 m)   Wt 275 lb 2.2 oz (124.8 kg)   SpO2 94%   BMI 38.37 kg/m²     Temp (24hrs), Av.4 °F (36.9 °C), Min:98.2 °F (36.8 °C), Max:98.6 °F (37 °C)    In: 3102   Out: 825 [Urine:800]    Physical Exam:  Constitutional: This is a well developed, well nourished, 35-39.9 - Obesity Grade II 56y.o. year old male who is alert, oriented, cooperative and in no apparent distress. Head:normocephalic and atraumatic. EENT:  PERRLA. No conjunctival injections. Septum was midline, mucosa was without erythema, exudates or cobblestoning. No thrush was noted. Neck: Supple without thyromegaly. No elevated JVP. Trachea was midline. Respiratory: Chest was symmetrical without dullness to percussion. Breath sounds bilaterally were clear to auscultation. There were no wheezes, rhonchi or rales. There is no intercostal retraction or use of accessory muscles. No egophony noted. Cardiovascular: Regular without murmur, clicks, gallops or rubs. Abdomen: soft, distended, attp. Gastrostomy tube intact, unclamped. Incision c/d/i w/ staples. Lymphatic: No lymphadenopathy. Musculoskeletal: Normal curvature of the spine. No gross muscle weakness. Extremities:  No lower extremity edema, ulcerations, tenderness, varicosities or erythema.   Muscle size, tone and strength are normal.  No involuntary movements are noted. Skin:  Warm and dry. Good color, turgor and pigmentation. No lesions or scars.   No cyanosis or clubbing  Neurological/Psychiatric: The patient's general behavior, level of consciousness, thought content and emotional status is normal.        Medications:  Scheduled Medications:    insulin glargine  30 Units Subcutaneous Nightly    insulin lispro  0-18 Units Subcutaneous Q6H    insulin lispro  0-9 Units Subcutaneous Nightly    iron sucrose  200 mg Intravenous Q24H    acetaminophen  1,000 mg Oral Q8H    methocarbamol  750 mg Oral Q6H    gabapentin  300 mg Oral Q8H    sodium chloride  20 mL Intravenous Once    metoprolol  5 mg Intravenous Q4H    fat emulsion  250 mL Intravenous Daily    famotidine (PEPCID) injection  20 mg Intravenous Daily    cefepime  1 g Intravenous Q12H    sodium chloride flush  10 mL Intravenous 2 times per day    sodium chloride flush  10 mL Intravenous 2 times per day    heparin (porcine)  5,000 Units Subcutaneous 3 times per day    metroNIDAZOLE  500 mg Intravenous Q8H    sodium chloride flush  10 mL Intravenous 2 times per day     Continuous Infusions:    PN-Adult 2-in-1 Central Line (Standard) 65 mL/hr at 09/06/20 1740    dextrose      dextrose      sodium chloride 100 mL/hr at 09/04/20 0955     PRN MedicationsHYDROmorphone, 0.5 mg, Q6H PRN  glucose, 15 g, PRN  dextrose, 12.5 g, PRN  glucagon (rDNA), 1 mg, PRN  dextrose, 100 mL/hr, PRN  fentanNYL, 25 mcg, Q2H PRN  oxyCODONE, 5 mg, Q4H PRN  metoprolol, 5 mg, Q6H PRN  glucose, 15 g, PRN  dextrose, 12.5 g, PRN  glucagon (rDNA), 1 mg, PRN  dextrose, 100 mL/hr, PRN  sodium chloride flush, 10 mL, PRN  sodium chloride flush, 10 mL, PRN  sodium chloride flush, 10 mL, PRN  promethazine, 12.5 mg, Q6H PRN    Or  ondansetron, 4 mg, Q6H PRN        Diagnostic Labs:  CBC:   Recent Labs     09/05/20  1843 09/06/20  0603 09/07/20  0650   WBC 15.1* 14.7* 19.8*   RBC 2.99* 3.31* 3.87*   HGB 8.2* 9.3* 10.4*   HCT 26.9* 29.2* 34.9*   MCV 90.0 88.2 90.2   RDW 13.8 14.2 14.2    297 337     BMP:   Recent Labs     09/05/20  0553 09/06/20  0603 09/07/20  0650    139 135   K 4.0 3.8 4.2    104 105   CO2 22 23 19*   PHOS 1.5* 2.1* 2.1*   BUN 30* 28* 31*   CREATININE 1.81* 1.74* 1.55*     BNP: No results for input(s): BNP in the last 72 hours. PT/INR: No results for input(s): PROTIME, INR in the last 72 hours. APTT: No results for input(s): APTT in the last 72 hours. CARDIAC ENZYMES: No results for input(s): CKMB, CKMBINDEX, TROPONINI in the last 72 hours. Invalid input(s): CKTOTAL;3  FASTING LIPID PANEL:  Lab Results   Component Value Date    CHOL 131 06/22/2020    HDL 31 (L) 06/22/2020    TRIG 115 09/05/2020     LIVER PROFILE:   Recent Labs     09/05/20  0553   AST 14   ALT 12   BILITOT 0.55   ALKPHOS 62      MICROBIOLOGY:   Lab Results   Component Value Date/Time    CULTURE NO GROWTH 4 DAYS 09/02/2020 04:49 AM       Imaging:    Ct Abdomen Pelvis Wo Contrast    Result Date: 9/1/2020  1. Proximal small-bowel obstruction due to extension of an infiltrative lower pole left renal mass to the mesentery and adjacent small bowel. There is marked gastric distension. 2. Infiltrative lower pole left renal mass, presumed malignant measuring 4.8 x 6.9 x 9.9 cm. Nonobstructive left nephrolithiasis. 3. Trace free pelvic fluid. Findings were discussed with Vale Petersen at 3:37 pm on 8/31/2020. Xr Abdomen (kub) (single Ap View)    Addendum Date: 9/1/2020    ADDENDUM: Dose area product 1037.1 mGy per cm squared and fluoro time 5.6 seconds     Result Date: 9/1/2020  Please correlate with clinical service     Xr Acute Abd Series Chest 1 Vw    Result Date: 8/31/2020  No acute cardiopulmonary disease. 5 x 11 mm calcification, possibly a renal or proximal ureteral calculus. Nonspecific bowel-gas pattern.      Xr Chest Portable    Result Date: 9/3/2020  Line placement as above and urine cultures showed no growth to date. Still have leukocytosis. Patient is currently on cefepime and metronidazole.     Essential hypertension:  Patient is on Lopressor 5 mg intravenously every 4 hours.       Acute blood loss anemia  Patient had low hemoglobin after surgery. 3 units of PRBC ordered per urology. Hemoglobin now is 9.4        DVT ppx : Heparin  GI ppx: Pepsid     PT/OT: ongoing  Discharge Planning / SW: ongoing  rGegory Gross MD  Internal Medicine Resident, PGY-1  [id number];  [place], [place]  9/7/2020, 8:27 AM

## 2020-09-08 LAB
ABSOLUTE EOS #: 0 K/UL (ref 0–0.4)
ABSOLUTE IMMATURE GRANULOCYTE: 1.45 K/UL (ref 0–0.3)
ABSOLUTE LYMPH #: 1.81 K/UL (ref 1–4.8)
ABSOLUTE MONO #: 0.91 K/UL (ref 0.1–0.8)
ANION GAP SERPL CALCULATED.3IONS-SCNC: 11 MMOL/L (ref 9–17)
ANION GAP SERPL CALCULATED.3IONS-SCNC: 8 MMOL/L (ref 9–17)
BASOPHILS # BLD: 0 % (ref 0–2)
BASOPHILS ABSOLUTE: 0 K/UL (ref 0–0.2)
BUN BLDV-MCNC: 29 MG/DL (ref 8–23)
BUN BLDV-MCNC: 30 MG/DL (ref 8–23)
BUN/CREAT BLD: ABNORMAL (ref 9–20)
BUN/CREAT BLD: ABNORMAL (ref 9–20)
CALCIUM SERPL-MCNC: 8.2 MG/DL (ref 8.6–10.4)
CALCIUM SERPL-MCNC: 8.2 MG/DL (ref 8.6–10.4)
CHLORIDE BLD-SCNC: 106 MMOL/L (ref 98–107)
CHLORIDE BLD-SCNC: 107 MMOL/L (ref 98–107)
CO2: 20 MMOL/L (ref 20–31)
CO2: 21 MMOL/L (ref 20–31)
CREAT SERPL-MCNC: 1.39 MG/DL (ref 0.7–1.2)
CREAT SERPL-MCNC: 1.41 MG/DL (ref 0.7–1.2)
CULTURE: NORMAL
CULTURE: NORMAL
DIFFERENTIAL TYPE: ABNORMAL
EOSINOPHILS RELATIVE PERCENT: 0 % (ref 1–4)
ESTIMATED AVERAGE GLUCOSE: 272 MG/DL
ESTIMATED AVERAGE GLUCOSE: 275 MG/DL
GFR AFRICAN AMERICAN: >60 ML/MIN
GFR AFRICAN AMERICAN: >60 ML/MIN
GFR NON-AFRICAN AMERICAN: 51 ML/MIN
GFR NON-AFRICAN AMERICAN: 52 ML/MIN
GFR SERPL CREATININE-BSD FRML MDRD: ABNORMAL ML/MIN/{1.73_M2}
GLUCOSE BLD-MCNC: 131 MG/DL (ref 75–110)
GLUCOSE BLD-MCNC: 131 MG/DL (ref 75–110)
GLUCOSE BLD-MCNC: 133 MG/DL (ref 70–99)
GLUCOSE BLD-MCNC: 135 MG/DL (ref 75–110)
GLUCOSE BLD-MCNC: 142 MG/DL (ref 75–110)
GLUCOSE BLD-MCNC: 147 MG/DL (ref 75–110)
GLUCOSE BLD-MCNC: 191 MG/DL (ref 75–110)
GLUCOSE BLD-MCNC: 227 MG/DL (ref 75–110)
GLUCOSE BLD-MCNC: 228 MG/DL (ref 75–110)
GLUCOSE BLD-MCNC: 232 MG/DL (ref 75–110)
GLUCOSE BLD-MCNC: 236 MG/DL (ref 75–110)
GLUCOSE BLD-MCNC: 243 MG/DL (ref 75–110)
GLUCOSE BLD-MCNC: 244 MG/DL (ref 70–99)
GLUCOSE BLD-MCNC: 245 MG/DL (ref 75–110)
GLUCOSE BLD-MCNC: 270 MG/DL (ref 75–110)
HBA1C MFR BLD: 11.1 % (ref 4–6)
HBA1C MFR BLD: 11.2 % (ref 4–6)
HCT VFR BLD CALC: 32 % (ref 40.7–50.3)
HEMOGLOBIN: 9.6 G/DL (ref 13–17)
IMMATURE GRANULOCYTES: 8 %
LYMPHOCYTES # BLD: 10 % (ref 24–44)
Lab: NORMAL
Lab: NORMAL
MAGNESIUM: 2 MG/DL (ref 1.6–2.6)
MAGNESIUM: 2.1 MG/DL (ref 1.6–2.6)
MCH RBC QN AUTO: 27.1 PG (ref 25.2–33.5)
MCHC RBC AUTO-ENTMCNC: 30 G/DL (ref 28.4–34.8)
MCV RBC AUTO: 90.4 FL (ref 82.6–102.9)
MONOCYTES # BLD: 5 % (ref 1–7)
MORPHOLOGY: NORMAL
NRBC AUTOMATED: 0 PER 100 WBC
PDW BLD-RTO: 13.8 % (ref 11.8–14.4)
PHOSPHORUS: 2.4 MG/DL (ref 2.5–4.5)
PHOSPHORUS: 2.8 MG/DL (ref 2.5–4.5)
PLATELET # BLD: 322 K/UL (ref 138–453)
PLATELET ESTIMATE: ABNORMAL
PMV BLD AUTO: 11.1 FL (ref 8.1–13.5)
POTASSIUM SERPL-SCNC: 4 MMOL/L (ref 3.7–5.3)
POTASSIUM SERPL-SCNC: 4.1 MMOL/L (ref 3.7–5.3)
RBC # BLD: 3.54 M/UL (ref 4.21–5.77)
RBC # BLD: ABNORMAL 10*6/UL
SEG NEUTROPHILS: 77 % (ref 36–66)
SEGMENTED NEUTROPHILS ABSOLUTE COUNT: 13.93 K/UL (ref 1.8–7.7)
SODIUM BLD-SCNC: 135 MMOL/L (ref 135–144)
SODIUM BLD-SCNC: 138 MMOL/L (ref 135–144)
SPECIMEN DESCRIPTION: NORMAL
SPECIMEN DESCRIPTION: NORMAL
WBC # BLD: 18.1 K/UL (ref 3.5–11.3)
WBC # BLD: ABNORMAL 10*3/UL

## 2020-09-08 PROCEDURE — 6360000002 HC RX W HCPCS: Performed by: STUDENT IN AN ORGANIZED HEALTH CARE EDUCATION/TRAINING PROGRAM

## 2020-09-08 PROCEDURE — 85025 COMPLETE CBC W/AUTO DIFF WBC: CPT

## 2020-09-08 PROCEDURE — 6360000002 HC RX W HCPCS

## 2020-09-08 PROCEDURE — 2580000003 HC RX 258: Performed by: STUDENT IN AN ORGANIZED HEALTH CARE EDUCATION/TRAINING PROGRAM

## 2020-09-08 PROCEDURE — 36415 COLL VENOUS BLD VENIPUNCTURE: CPT

## 2020-09-08 PROCEDURE — 2500000003 HC RX 250 WO HCPCS: Performed by: STUDENT IN AN ORGANIZED HEALTH CARE EDUCATION/TRAINING PROGRAM

## 2020-09-08 PROCEDURE — 83735 ASSAY OF MAGNESIUM: CPT

## 2020-09-08 PROCEDURE — 83036 HEMOGLOBIN GLYCOSYLATED A1C: CPT

## 2020-09-08 PROCEDURE — 97110 THERAPEUTIC EXERCISES: CPT

## 2020-09-08 PROCEDURE — 6370000000 HC RX 637 (ALT 250 FOR IP): Performed by: STUDENT IN AN ORGANIZED HEALTH CARE EDUCATION/TRAINING PROGRAM

## 2020-09-08 PROCEDURE — 84100 ASSAY OF PHOSPHORUS: CPT

## 2020-09-08 PROCEDURE — 97535 SELF CARE MNGMENT TRAINING: CPT

## 2020-09-08 PROCEDURE — 99233 SBSQ HOSP IP/OBS HIGH 50: CPT | Performed by: INTERNAL MEDICINE

## 2020-09-08 PROCEDURE — 82947 ASSAY GLUCOSE BLOOD QUANT: CPT

## 2020-09-08 PROCEDURE — 97530 THERAPEUTIC ACTIVITIES: CPT

## 2020-09-08 PROCEDURE — 2060000000 HC ICU INTERMEDIATE R&B

## 2020-09-08 PROCEDURE — 80048 BASIC METABOLIC PNL TOTAL CA: CPT

## 2020-09-08 PROCEDURE — 97116 GAIT TRAINING THERAPY: CPT

## 2020-09-08 RX ORDER — DEXTROSE MONOHYDRATE 25 G/50ML
12.5 INJECTION, SOLUTION INTRAVENOUS PRN
Status: DISCONTINUED | OUTPATIENT
Start: 2020-09-08 | End: 2020-09-11 | Stop reason: HOSPADM

## 2020-09-08 RX ORDER — DEXTROSE MONOHYDRATE 25 G/50ML
12.5 INJECTION, SOLUTION INTRAVENOUS PRN
Status: DISCONTINUED | OUTPATIENT
Start: 2020-09-08 | End: 2020-09-08 | Stop reason: SDUPTHER

## 2020-09-08 RX ORDER — ALBUTEROL SULFATE 2.5 MG/3ML
2.5 SOLUTION RESPIRATORY (INHALATION) EVERY 6 HOURS PRN
Status: DISCONTINUED | OUTPATIENT
Start: 2020-09-08 | End: 2020-09-11 | Stop reason: HOSPADM

## 2020-09-08 RX ORDER — FENTANYL CITRATE 50 UG/ML
25 INJECTION, SOLUTION INTRAMUSCULAR; INTRAVENOUS ONCE
Status: COMPLETED | OUTPATIENT
Start: 2020-09-08 | End: 2020-09-08

## 2020-09-08 RX ORDER — NICOTINE POLACRILEX 4 MG
15 LOZENGE BUCCAL PRN
Status: DISCONTINUED | OUTPATIENT
Start: 2020-09-08 | End: 2020-09-08 | Stop reason: SDUPTHER

## 2020-09-08 RX ORDER — DEXTROSE MONOHYDRATE 50 MG/ML
100 INJECTION, SOLUTION INTRAVENOUS PRN
Status: DISCONTINUED | OUTPATIENT
Start: 2020-09-08 | End: 2020-09-11 | Stop reason: HOSPADM

## 2020-09-08 RX ORDER — METOCLOPRAMIDE HYDROCHLORIDE 5 MG/ML
10 INJECTION INTRAMUSCULAR; INTRAVENOUS EVERY 6 HOURS
Status: DISCONTINUED | OUTPATIENT
Start: 2020-09-08 | End: 2020-09-11

## 2020-09-08 RX ORDER — NICOTINE POLACRILEX 4 MG
15 LOZENGE BUCCAL PRN
Status: DISCONTINUED | OUTPATIENT
Start: 2020-09-08 | End: 2020-09-11 | Stop reason: HOSPADM

## 2020-09-08 RX ORDER — FENTANYL CITRATE 50 UG/ML
INJECTION, SOLUTION INTRAMUSCULAR; INTRAVENOUS
Status: COMPLETED
Start: 2020-09-08 | End: 2020-09-08

## 2020-09-08 RX ORDER — DEXTROSE MONOHYDRATE 50 MG/ML
100 INJECTION, SOLUTION INTRAVENOUS PRN
Status: DISCONTINUED | OUTPATIENT
Start: 2020-09-08 | End: 2020-09-08 | Stop reason: SDUPTHER

## 2020-09-08 RX ADMIN — HEPARIN SODIUM 5000 UNITS: 5000 INJECTION INTRAVENOUS; SUBCUTANEOUS at 06:03

## 2020-09-08 RX ADMIN — METOPROLOL TARTRATE 5 MG: 1 INJECTION, SOLUTION INTRAVENOUS at 18:32

## 2020-09-08 RX ADMIN — CEFEPIME HYDROCHLORIDE 1 G: 1 INJECTION, POWDER, FOR SOLUTION INTRAMUSCULAR; INTRAVENOUS at 03:04

## 2020-09-08 RX ADMIN — METOPROLOL TARTRATE 5 MG: 1 INJECTION, SOLUTION INTRAVENOUS at 10:13

## 2020-09-08 RX ADMIN — FENTANYL CITRATE 25 MCG: 50 INJECTION, SOLUTION INTRAMUSCULAR; INTRAVENOUS at 08:50

## 2020-09-08 RX ADMIN — METOPROLOL TARTRATE 5 MG: 1 INJECTION, SOLUTION INTRAVENOUS at 02:23

## 2020-09-08 RX ADMIN — CEFEPIME HYDROCHLORIDE 1 G: 1 INJECTION, POWDER, FOR SOLUTION INTRAMUSCULAR; INTRAVENOUS at 15:51

## 2020-09-08 RX ADMIN — HYDROMORPHONE HYDROCHLORIDE 0.5 MG: 1 INJECTION, SOLUTION INTRAMUSCULAR; INTRAVENOUS; SUBCUTANEOUS at 18:23

## 2020-09-08 RX ADMIN — POTASSIUM CHLORIDE: 2 INJECTION, SOLUTION, CONCENTRATE INTRAVENOUS at 18:17

## 2020-09-08 RX ADMIN — ACETAMINOPHEN 1000 MG: 500 TABLET ORAL at 14:43

## 2020-09-08 RX ADMIN — METOCLOPRAMIDE 10 MG: 5 INJECTION, SOLUTION INTRAMUSCULAR; INTRAVENOUS at 12:58

## 2020-09-08 RX ADMIN — ACETAMINOPHEN 1000 MG: 500 TABLET ORAL at 22:34

## 2020-09-08 RX ADMIN — METRONIDAZOLE 500 MG: 500 INJECTION, SOLUTION INTRAVENOUS at 09:44

## 2020-09-08 RX ADMIN — GABAPENTIN 300 MG: 300 CAPSULE ORAL at 14:42

## 2020-09-08 RX ADMIN — OXYCODONE HYDROCHLORIDE 5 MG: 5 TABLET ORAL at 08:53

## 2020-09-08 RX ADMIN — METHOCARBAMOL TABLETS 750 MG: 750 TABLET, COATED ORAL at 18:25

## 2020-09-08 RX ADMIN — FAMOTIDINE 20 MG: 10 INJECTION INTRAVENOUS at 08:53

## 2020-09-08 RX ADMIN — GABAPENTIN 300 MG: 300 CAPSULE ORAL at 22:34

## 2020-09-08 RX ADMIN — OXYCODONE HYDROCHLORIDE 5 MG: 5 TABLET ORAL at 18:25

## 2020-09-08 RX ADMIN — SODIUM CHLORIDE 5.55 UNITS/HR: 9 INJECTION, SOLUTION INTRAVENOUS at 13:42

## 2020-09-08 RX ADMIN — SODIUM CHLORIDE: 9 INJECTION, SOLUTION INTRAVENOUS at 21:36

## 2020-09-08 RX ADMIN — HEPARIN SODIUM 5000 UNITS: 5000 INJECTION INTRAVENOUS; SUBCUTANEOUS at 14:42

## 2020-09-08 RX ADMIN — Medication 10 ML: at 09:05

## 2020-09-08 RX ADMIN — SODIUM CHLORIDE, PRESERVATIVE FREE 10 ML: 5 INJECTION INTRAVENOUS at 09:05

## 2020-09-08 RX ADMIN — METRONIDAZOLE 500 MG: 500 INJECTION, SOLUTION INTRAVENOUS at 02:23

## 2020-09-08 RX ADMIN — METOPROLOL TARTRATE 5 MG: 1 INJECTION, SOLUTION INTRAVENOUS at 22:34

## 2020-09-08 RX ADMIN — METOCLOPRAMIDE 10 MG: 5 INJECTION, SOLUTION INTRAMUSCULAR; INTRAVENOUS at 08:53

## 2020-09-08 RX ADMIN — ACETAMINOPHEN 1000 MG: 500 TABLET ORAL at 06:23

## 2020-09-08 RX ADMIN — METOPROLOL TARTRATE 5 MG: 1 INJECTION, SOLUTION INTRAVENOUS at 06:03

## 2020-09-08 RX ADMIN — FENTANYL CITRATE 25 MCG: 50 INJECTION, SOLUTION INTRAMUSCULAR; INTRAVENOUS at 00:49

## 2020-09-08 RX ADMIN — METHOCARBAMOL TABLETS 750 MG: 750 TABLET, COATED ORAL at 06:23

## 2020-09-08 RX ADMIN — METOCLOPRAMIDE 10 MG: 5 INJECTION, SOLUTION INTRAMUSCULAR; INTRAVENOUS at 18:23

## 2020-09-08 RX ADMIN — GABAPENTIN 300 MG: 300 CAPSULE ORAL at 06:23

## 2020-09-08 RX ADMIN — FENTANYL CITRATE 25 MCG: 50 INJECTION, SOLUTION INTRAMUSCULAR; INTRAVENOUS at 05:38

## 2020-09-08 RX ADMIN — HYDROMORPHONE HYDROCHLORIDE 0.5 MG: 1 INJECTION, SOLUTION INTRAMUSCULAR; INTRAVENOUS; SUBCUTANEOUS at 04:05

## 2020-09-08 RX ADMIN — HEPARIN SODIUM 5000 UNITS: 5000 INJECTION INTRAVENOUS; SUBCUTANEOUS at 22:34

## 2020-09-08 RX ADMIN — METRONIDAZOLE 500 MG: 500 INJECTION, SOLUTION INTRAVENOUS at 19:00

## 2020-09-08 RX ADMIN — INSULIN LISPRO 6 UNITS: 100 INJECTION, SOLUTION INTRAVENOUS; SUBCUTANEOUS at 02:30

## 2020-09-08 RX ADMIN — SODIUM PHOSPHATE, MONOBASIC, MONOHYDRATE 20 MMOL: 276; 142 INJECTION, SOLUTION INTRAVENOUS at 11:26

## 2020-09-08 RX ADMIN — SODIUM CHLORIDE 300 MG: 9 INJECTION, SOLUTION INTRAVENOUS at 11:28

## 2020-09-08 RX ADMIN — METOPROLOL TARTRATE 5 MG: 1 INJECTION, SOLUTION INTRAVENOUS at 14:42

## 2020-09-08 RX ADMIN — I.V. FAT EMULSION 250 ML: 20 EMULSION INTRAVENOUS at 18:22

## 2020-09-08 RX ADMIN — HYDROMORPHONE HYDROCHLORIDE 0.5 MG: 1 INJECTION, SOLUTION INTRAMUSCULAR; INTRAVENOUS; SUBCUTANEOUS at 12:02

## 2020-09-08 RX ADMIN — OXYCODONE HYDROCHLORIDE 5 MG: 5 TABLET ORAL at 14:42

## 2020-09-08 RX ADMIN — METHOCARBAMOL TABLETS 750 MG: 750 TABLET, COATED ORAL at 12:58

## 2020-09-08 RX ADMIN — SODIUM CHLORIDE: 9 INJECTION, SOLUTION INTRAVENOUS at 09:03

## 2020-09-08 RX ADMIN — OXYCODONE HYDROCHLORIDE 5 MG: 5 TABLET ORAL at 22:34

## 2020-09-08 ASSESSMENT — PAIN DESCRIPTION - PAIN TYPE
TYPE: SURGICAL PAIN
TYPE: ACUTE PAIN;SURGICAL PAIN

## 2020-09-08 ASSESSMENT — PAIN SCALES - GENERAL
PAINLEVEL_OUTOF10: 7
PAINLEVEL_OUTOF10: 8
PAINLEVEL_OUTOF10: 4
PAINLEVEL_OUTOF10: 7
PAINLEVEL_OUTOF10: 7
PAINLEVEL_OUTOF10: 8
PAINLEVEL_OUTOF10: 7
PAINLEVEL_OUTOF10: 8
PAINLEVEL_OUTOF10: 7
PAINLEVEL_OUTOF10: 7
PAINLEVEL_OUTOF10: 8
PAINLEVEL_OUTOF10: 7
PAINLEVEL_OUTOF10: 5
PAINLEVEL_OUTOF10: 7
PAINLEVEL_OUTOF10: 7
PAINLEVEL_OUTOF10: 4

## 2020-09-08 ASSESSMENT — PAIN DESCRIPTION - FREQUENCY
FREQUENCY: CONTINUOUS
FREQUENCY: CONTINUOUS

## 2020-09-08 ASSESSMENT — PAIN DESCRIPTION - ONSET: ONSET: ON-GOING

## 2020-09-08 ASSESSMENT — PAIN DESCRIPTION - DESCRIPTORS
DESCRIPTORS: ACHING;CRAMPING;DISCOMFORT
DESCRIPTORS: ACHING;DISCOMFORT;SORE;SHARP
DESCRIPTORS: ACHING;CRAMPING;DISCOMFORT
DESCRIPTORS: ACHING;BURNING;CRAMPING
DESCRIPTORS: ACHING;CRAMPING;DISCOMFORT

## 2020-09-08 ASSESSMENT — PAIN DESCRIPTION - LOCATION
LOCATION: ABDOMEN;BACK
LOCATION: ABDOMEN
LOCATION: ABDOMEN
LOCATION: ABDOMEN;BACK;BUTTOCKS
LOCATION: ABDOMEN;BACK
LOCATION: ABDOMEN;BACK

## 2020-09-08 ASSESSMENT — PAIN DESCRIPTION - ORIENTATION: ORIENTATION: MID

## 2020-09-08 NOTE — PROGRESS NOTES
SBA  Supine to Sit: SBA  Scooting: SBA  Comment: Cues for log rolling, MIN trunk support with HOB raised  Transfers  Sit to Stand: CGA  Stand to sit: CGA  Ambulation  Ambulation?: Yes  Ambulation 1  Surface: level tile  Device: Rolling Walker  Assistance: Contact guard assistance  Quality of Gait: flexed posture, extremely slow zafar, wide NEEL, steady, no LOB  Distance: 25' x 2 ft  Stairs/Curb  Stairs?: No   Balance  Posture: Good  Sitting - Static: Good  Sitting - Dynamic: Fair;+  Standing - Static: Fair  Standing - Dynamic: Fair;-  Comments: wtih RW for support  Pt declined ex's and further ambulation.      Goals  Short term goals  Time Frame for Short term goals: 15  Short term goal 1: Pt to perform bed mobility CGA  Short term goal 2: Pt to demonstrate functional transfers Terrance  Short term goal 3: Ambulate 100ft w/ RW CGA  Short term goal 4: Tolerate 30 minutes of therapy to demo increased endurance  Patient Goals   Patient goals : Did not state    Plan    Plan  Times per week: 5-6x/week  Current Treatment Recommendations: Strengthening, Transfer Training, Endurance Training, Patient/Caregiver Education & Training, ROM, Balance Training, Gait Training, Home Exercise Program, Functional Mobility Training, Stair training, Safety Education & Training  Safety Devices  Type of devices: Call light within reach, Gait belt, Nurse notified, Patient at risk for falls, Left in chair  Restraints  Initially in place: No     Therapy Time   Individual Concurrent Group Co-treatment   Time In  100         Time Out  130         Minutes  [id number] [facility] [address], PTA

## 2020-09-08 NOTE — PROGRESS NOTES
Comprehensive Nutrition Assessment    Type and Reason for Visit:  Reassess    Nutrition Recommendations/Plan: Continue TPN. Add sodium phos to next TPN. Increase insulin to 25 units. Monitor blood glucose with TPN + insulin drip and adjust as appropriate. Nutrition Assessment:  Pt to be started on insulin drip today d/t hyperglycemia. 15 units of insulin currently in TPN and noted 30 additional units of insulin given outside of TPN x 24 hours. Noted sodium phos replaced. Pt still c/o abd pain, but asking when he can eat. RN reports pt still has absent/hypoactive bowel sounds, but is passing flatus and had a BM. Noted PEG tube output of 2575 mL x 24 hours.       Estimated Daily Nutrient Needs:  Energy (kcal):  4644-7942 kcal/day; Weight Used for Energy Requirements:  Ideal     Protein (g):  95 g pro/day; Weight Used for Protein Requirements:  Ideal(1.2)          Nutrition Related Findings:  Glucose 227-244 mg/dL      Wounds:  Surgical Wound       Current Nutrition Therapies:    Current Parenteral Nutrition Orders:  · Type and Formula: 2-in-1 Custom(220 gm dextrose, 95 gm AA)   · Lipids: 250ml, Daily  · Duration: Continuous  · Rate/Volume: 65 mL/hr; 1560 mLs/d  · Current PN Order Provides: 220 gm dextrose, 95 gm AA + 250 mL 20% IL = 1560 kcals, 95 gm protein    Anthropometric Measures:  · Height: 5' 11\" (180.3 cm)  · Current Body Weight: 275 lb (124.7 kg)   · Admission Body Weight: 257 lb (116.6 kg)    · Usual Body Weight: 294 lb (133.4 kg)(3/16/20 per EHR)     · Ideal Body Weight: 172 lbs; % Ideal Body Weight 159.9 %   · BMI: 38.4  · BMI Categories: Obese Class 2 (BMI 35.0 -39.9)       Nutrition Diagnosis:   · Inadequate oral intake related to altered GI function as evidenced by NPO or clear liquid status due to medical condition, nutrition support - parenteral nutrition      Nutrition Interventions:   Food and/or Nutrient Delivery:  Continue NPO, Modify Parenteral Nutrition  Nutrition Education/Counseling: No recommendation at this time   Coordination of Nutrition Care:  Continued Inpatient Monitoring    Goals:  meet % of estimated nutrition needs       Nutrition Monitoring and Evaluation:   Behavioral-Environmental Outcomes:  (N/A)   Food/Nutrient Intake Outcomes:  Diet Advancement/Tolerance, Parenteral Nutrition Intake/Tolerance  Physical Signs/Symptoms Outcomes:  GI Status, Biochemical Data, Nutrition Focused Physical Findings, Weight     Discharge Planning:     Too soon to determine     Electronically signed by Tavares Valdivia MS, RD, LD on 9/8/20 at 2:41 PM EDT    Contact: [phone]

## 2020-09-08 NOTE — PROGRESS NOTES
[facility], [facility]. Small bowel obstruction ([address] [K56.609]  Small bowel obstruction ([address] [K56.609] . Vitals:    09/08/20 1300   BP: (!) 151/98   Pulse: 101   Resp: 18   Temp: 99.6 °F (37.6 °C)   SpO2: 93%   . Patients home meds are   Prior to Admission medications    Medication Sig Start Date End Date Taking? Authorizing Provider   traMADol (ULTRAM) 50 MG tablet Take 100 mg by mouth every 6 hours as needed for Pain.     Historical Provider, MD   Melatonin 10 MG TABS Take 10 mg by mouth nightly as needed (SLEEP)    Historical Provider, MD   vitamin C (ASCORBIC ACID) 500 MG tablet Take 500 mg by mouth daily    Historical Provider, MD   lidocaine 4 % external patch Place 1 patch onto the skin daily  Patient taking differently: Place 1 patch onto the skin daily as needed (TO HIP)  8/11/20 9/10/20  Margo Messer MD   acetaminophen (TYLENOL) 500 MG tablet Take 2 tablets by mouth every 6 hours as needed for Pain 8/11/20 8/21/20  Margo Messer MD   cyclobenzaprine (FLEXERIL) 10 MG tablet Take 1 tablet by mouth 3 times daily as needed for Muscle spasms  Patient taking differently: Take 10 mg by mouth 2 times daily as needed for Muscle spasms  8/11/20   Margo Messer MD   aspirin 81 MG tablet Take 81 mg by mouth nightly     Historical Provider, MD   SITagliptin (JANUVIA) 50 MG tablet Take 25 mg by mouth daily     Historical Provider, MD   pravastatin (PRAVACHOL) 20 MG tablet Take 20 mg by mouth daily    Historical Provider, MD   metoprolol (LOPRESSOR) 50 MG tablet Take 50 mg by mouth 2 times daily    Historical Provider, MD   lisinopril (PRINIVIL;ZESTRIL) 20 MG tablet Take 20 mg by mouth daily    Historical Provider, MD   allopurinol (ZYLOPRIM) 100 MG tablet Take 100 mg by mouth daily    Historical Provider, MD   zolpidem (AMBIEN) 10 MG tablet Take by mouth nightly as needed for Sleep    Historical Provider, MD   hydrALAZINE (APRESOLINE) 25 MG tablet Take 25 mg by mouth 3 times daily     Historical Provider, MD   furosemide (LASIX) 40 MG tablet Take 40 mg by mouth daily as needed (LEG SWELLING)     Historical Provider, MD   .  Recent Surgical History: None = 0     Assessment       IS volume 1500  IBW na    RR 18  Breath Sounds: clear but diminished throughout      · Bronchodilator assessment at level  1  · Hyperinflation assessment at level   · Secretion Management assessment at level    ·   · [x]    Bronchodilator Assessment  BRONCHODILATOR ASSESSMENT SCORE  Score 0 1 2 3 4 5   Breath Sounds   []  Patient Baseline [x]  No Wheeze good aeration []  Faint, scattered wheezing, good aeration []  Expiratory Wheezing and or moderately diminished []  Insp/Exp wheeze and/or very diminished []  Insp/Exp and/ or marked distress   Respiratory Rate   []  Patient Baseline [x]  Less than 20 []  Less than 20 []  20-25 []  Greater than 25 []  Greater than 25   Peak flow % of Pred or PB [x]  NA   []  Greater than 90%  []  81-90% []  71-80% []  Less than or equal to 70%  or unable to perform []  Unable due to Respiratory Distress   Dyspnea re [x]  Patient Baseline []  No SOB []  No SOB []  SOB on exertion []  SOB min activity []  At rest/acute   e FEV% Predicted       [x]  NA []  Above 69%  []  Unable []  Above 60-69%  []  Unable []  Above 50-59%  []  Unable []  Above 35-49%  []  Unable []  Less than 35%  []  Unable                 []  Hyperinflation Assessment  Score 1 2 3   CXR and Breath Sounds   []  Clear []  No atelectasis  Basilar aeration []  Atelectasis or absent basilar breath sounds   Incentive Spirometry Volume  (Per IBW)   []  Greater than or equal to 15ml/Kg []  less than 15ml/Kg []  less than 15ml/Kg   Surgery within last 2 weeks []  None or general   []  Abdominal or thoracic surgery  []  Abdominal or thoracic   Chronic Pulmonary Historyre []  No []  Yes []  Yes     []  Secretion Management Assessment  Score 1 2 3   Bilateral Breath Sounds   []  Occasional Rhonchi []  Scattered Rhonchi []  Course Rhonchi and/or poor aeration   Sputum    []  Small amount of thin secretions []  Moderate amount of viscous secretions []  Copius, Viscious Yellow/ Secretions   CXR as reported by physician []  clear  []  Unavailable []  Infiltrates and/or consolidation  []  Unavailable []  Mucus Plugging and or lobar consolidation  []  Unavailable   Cough []  Strong, productive cough []  Weak productive cough []  No cough or weak non-productive cough   Chelsea Gardner Black  2:40 PM                            FEMALE                                  MALE                            FEV1 Predicted Normal Values                        FEV1 Predicted Normal Values          Age                                     Height in Feet and Inches       Age                                     Height in Feet and Inches       4' 11\" 5' 1\" 5' 3\" 5' 5\" 5' 7\" 5' 9\" 5' 11\" 6' 1\"  4' 11\" 5' 1\" 5' 3\" 5' 5\" 5' 7\" 5' 9\" 5' 11\" 6' 1\"   42 - 45 2.49 2.66 2.84 3.03 3.22 3.42 3.62 3.83 42 - 45 2.82 3.03 3.26 3.49 3.72 3.96 4.22 4.47   46 - 49 2.40 2.57 2.76 2.94 3.14 3.33 3.54 3.75 46 - 49 2.70 2.92 3.14 3.37 3.61 3.85 4.10 4.36   50 - 53 2.31 2.48 2.66 2.85 3.04 3.24 3.45 3.66 50 - 53 2.58 2.80 3.02 3.25 3.49 3.73 3.98 4.24   54 - 57 2.21 2.38 2.57 2.75 2.95 3.14 3.35 3.56 54 - 57 2.46 2.67 2.89 3.12 3.36 3.60 3.85 4.11   58 - 61 2.10 2.28 2.46 2.65 2.84 3.04 3.24 3.45 58 - 61 2.32 2.54 2.76 2.99 3.23 3.47 3.72 3.98   62 - 65 1.99 2.17 2.35 2.54 2.73 2.93 3.13 3.34 62 - 65 2.19 2.40 2.62 2.85 3.09 3.33 3.58 3.84   66 - 69 1.88 2.05 2.23 2.42 2.61 2.81 3.02 3.23 66 - 69 2.04 2.26 2.48 2.71 2.95 3.19 3.44 3.70   70+ 1.82 1.99 2.17 2.36 2.55 2.75 2.95 3.16 70+ 1.97 2.19 2.41 2.64 2.87 3.12 3.37 3.62             Predicted Peak Expiratory Flow Rate                                       Height (in)  Female       Height (in) Male           Age 64 62 64 63 57 71 78 74 Age            21 344 357 372 387 402 417 432 446  60 62 64 66 68 70 72 74 76   25 337 352 032 091 673 13 99   70 269 284 299 314 329 344 359 374 70 353 382 410 439 468 [phone] 583   75 261 274 289 305 319 334 348 364 75 344 372 400 429 458 487 [phone]   80 253 266 282 296 312 327 342 356 80 335 362 390 419 448 [phone]

## 2020-09-08 NOTE — PROGRESS NOTES
[facility]  Internal Medicine Teaching Residency Program  Inpatient Daily Progress Note  ______________________________________________________________________________    Patient: Salome Monroe II  YOB: 1960   [id number]    Acct: [de-identified]     Room: [address] [place] [address]  Admit date: 9/1/2020  Today's date: 09/08/20  Number of days in the hospital: 7    SUBJECTIVE   Admitting Diagnosis: Small Intestine Obstruction and Left Renal Cell Carcinoma  CC: abdominal pain          Nausea and vomiting  Pt examined at bedside. Chart & results reviewed. Patient is feeling better today. His abdominal  Pain has improved. Passing flatus and had a bowel movement today and passed a small amount stool. ROS:  Constitutional:  negative for chills, fevers, sweats  Respiratory:  negative for cough, dyspnea on exertion, hemoptysis, shortness of breath, wheezing  Cardiovascular:  negative for chest pain, chest pressure/discomfort, lower extremity edema, palpitations  Gastrointestinal:  Positive  for abdominal pain, constipation and negative for diarrhea, nausea, vomiting  Neurological:  negative for dizziness, headache  BRIEF HISTORY   Patient originally [facility] to [facility] with symptoms of abdominal distention, abdominal pain, nausea emesis and inability to tolerate p.o. diet found to be severely dehydrated with acute kidney injury on CKD, elevated blood glucose in 400, leukocytosis 20,000 and tachycardic. CT abdomen pelvis was completed which demonstrated left renal mass 4.8 x 6.9 x 9.9 cm eroding into and causing small bowel obstruction.    On the CT abdomen patient was also noted to have a right ureteral stone 5 x 11 mm.   Patient was admitted to the ICU and general surgery was consulted along with urology at [address] was decompressed with NG tube, placed on IV fluids and insulin drip.  Pt was also started on cefepime and flagyl for empiric sepsis coverage. [facility] received cystoscopy and right ureteral stent.   During ICU stay patient underwent laparoscopic robotic nephrectomy and   Exploratory laparotomy bowel resection of proximal jejunum and descending colon with mobilization of splenic flexure and primary anastomosis of small bowel and colon along with placement of gastrostomy tub    OBJECTIVE     Vital Signs:  BP (!) 151/91   Pulse 106   Temp 98.3 °F (36.8 °C) (Oral)   Resp 20   Ht 5' 11\" (1.803 m)   Wt 275 lb 2.2 oz (124.8 kg)   SpO2 94%   BMI 38.37 kg/m²     Temp (24hrs), Av.2 °F (36.8 °C), Min:97.5 °F (36.4 °C), Max:99 °F (37.2 °C)    In: 4323.8   Out: 3375 [Urine:850]    Physical Exam:  Constitutional: This is a well developed, well nourished, 18.5-24.9 - Normal 56y.o. year old male who is alert, oriented, cooperative and in no apparent distress. Head:normocephalic and atraumatic. EENT:  PERRLA. No conjunctival injections. Septum was midline, mucosa was without erythema, exudates or cobblestoning. No thrush was noted. Neck: Supple without thyromegaly. No elevated JVP. Trachea was midline. Respiratory: Chest was symmetrical without dullness to percussion. Breath sounds bilaterally were clear to auscultation. There were no wheezes, rhonchi or rales. There is no intercostal retraction or use of accessory muscles. No egophony noted. Cardiovascular: Regular without murmur, clicks, gallops or rubs. Abdomen: Patient abdomen is soft, tender to palpation in the right quadrant, no rebound , no gaurding no rigidity. I could not appreciate the bowel sounds. Lymphatic: No lymphadenopathy. Musculoskeletal: Normal curvature of the spine. No gross muscle weakness. Extremities:  No lower extremity edema, ulcerations, tenderness, varicosities or erythema. Muscle size, tone and strength are normal.  No involuntary movements are noted. Skin:  Warm and dry. 09/07/20  0650 09/08/20  0512    135 135   K 3.8 4.2 4.1    105 106   CO2 23 19* 21   PHOS 2.1* 2.1* 2.4*   BUN 28* 31* 30*   CREATININE 1.74* 1.55* 1.41*     BNP: No results for input(s): BNP in the last 72 hours. PT/INR: No results for input(s): PROTIME, INR in the last 72 hours. APTT: No results for input(s): APTT in the last 72 hours. CARDIAC ENZYMES: No results for input(s): CKMB, CKMBINDEX, TROPONINI in the last 72 hours. Invalid input(s): CKTOTAL;3  FASTING LIPID PANEL:  Lab Results   Component Value Date    CHOL 131 06/22/2020    HDL 31 (L) 06/22/2020    TRIG 115 09/05/2020     LIVER PROFILE: No results for input(s): AST, ALT, ALB, BILIDIR, BILITOT, ALKPHOS in the last 72 hours. MICROBIOLOGY:   Lab Results   Component Value Date/Time    CULTURE NO GROWTH 6 DAYS 09/02/2020 04:49 AM       Imaging:    Ct Abdomen Pelvis Wo Contrast Additional Contrast? Oral And Rectal    Result Date: 9/7/2020  1. Interval left nephrectomy and small bowel and descending colon resection with primary anastomosis. No extravasation of administered oral contrast from the bowel to indicate bowel anastomotic leakage. 2. Small volume free intraperitoneal fluid, extensive left peritoneal reticulations and stranding and scattered pockets of free intraperitoneal air consistent with recent laparotomy. 3. Large filling defects in the sigmoid colon downstream from the colonic anastomosis probably combination of stool and blood products 4. Percutaneous gastrostomy tube in place. 5. Small left pleural effusion and subsegmental atelectasis. Trace right pleural effusion and subsegmental atelectasis.      Xr Abdomen (kub) (single Ap View)    Addendum Date: 9/1/2020    ADDENDUM: Dose area product 1037.1 mGy per cm squared and fluoro time 5.6 seconds     Result Date: 9/1/2020  Please correlate with clinical service     Xr Chest Portable    Result Date: 9/3/2020  Line placement as above Expiratory exam with interval increase in now mild streaky bibasilar atelectasis     Xr Chest Portable    Result Date: 9/1/2020  No acute cardiopulmonary abnormality. Xr Abdomen For Ng/og/ne Tube Placement    Result Date: 9/2/2020  Enteric tube terminates at the GE junction. Recommend advancement prior to use. Xr Abdomen (2 Views)    Result Date: 9/7/2020  Normal bowel gas pattern. ASSESSMENT & PLAN     ASSESSMENT / PLAN:     Active Problems:    Small bowel obstruction (HCC)  Plan: SB resection with primary anastomosis, left partial colectomy with primary anastomosis, open gastrectomy tube. Drain is 250 ml in the morning and greenish in color. It was 500 ml at night. Patient is still NPO and allowed to have ice chips. Continue antibiotics cefepime and flagyl. Renal Cell Carcinoma:  Had robotic nephrectomy. Urology on board. Septicemia:  Continue cefepime and metronidazole. Negative blood cultures and urine cultures but urine have leukocytes. Diabetes Mellitus On Insulin Infusion Now:  Patient blood sugars are running high now. Target blood sugar is <180. Keep monitoring blood glucose. Hypertension:  Patient currently on metoprolol injections. Was on Metoprolol 50 mg  and lisinopril 20 mg. DVT ppx : heparin   GI ppx:pepsid     PT/OT: ongoing  Discharge Planning / SW: ongoing    Nancy Hillman MD  Internal Medicine Resident, PGY-1  [facility];  [place], [place]  9/8/2020, 9:33 AM

## 2020-09-08 NOTE — PROGRESS NOTES
General Surgery:  Daily Progress Note    PATIENT NAME: Cali Srivastava II     TODAY'S DATE: 9/8/2020, 5:49 AM    SUBJECTIVE:     Pt seen and examined at bedside. No acute events overnight. Had ct abd/pelv for increasing leukocytosis. No abscess or evidence of leak. Pain controlled. Up oob. Had small bm, minimal flatus. Feels slightly bloated. Continues with belching. Afebrile. Blood glucose continue to be > 200    OBJECTIVE:   VITALS:  BP (!) 151/91   Pulse 106   Temp 98.3 °F (36.8 °C) (Oral)   Resp 20   Ht 5' 11\" (1.803 m)   Wt 275 lb 2.2 oz (124.8 kg)   SpO2 94%   BMI 38.37 kg/m²      INTAKE/OUTPUT:      Intake/Output Summary (Last 24 hours) at 9/8/2020 0549  Last data filed at 9/8/2020 0500  Gross per 24 hour   Intake 4323.77 ml   Output 3375 ml   Net 948.77 ml       PHYSICAL EXAM:  General Appearance: Alert, oriented. HEENT:  Normocephalic, atraumatic, mucus membranes moist.   Heart: s1+s2  Lungs: equal and symmetric chest rise/fall non-labored   Abdomen:soft, mild distended, attp. Gastrostomy tube intact, unclamped. Incision c/d/i w/ staples. Extremities: No cyanosis, pitting edema, rashes noted. Skin: Skin color, texture, turgor normal. No rashes or lesions.       Data:  CBC:   Recent Labs     09/05/20  1843 09/06/20  0603 09/07/20  0650   WBC 15.1* 14.7* 19.8*   HGB 8.2* 9.3* 10.4*    297 337     Chemistry:   Recent Labs     09/05/20  0553 09/06/20  0603 09/07/20  0650    139 135   K 4.0 3.8 4.2    104 105   CO2 22 23 19*   GLUCOSE 284* 293* 297*   BUN 30* 28* 31*   CREATININE 1.81* 1.74* 1.55*   MG 2.1 2.0 2.0   ANIONGAP 8* 12 11   LABGLOM 38* 40* 46*   GFRAA 46* 48* 55*   CALCIUM 8.1* 8.4* 8.3*   PHOS 1.5* 2.1* 2.1*     Hepatic:   Recent Labs     09/05/20  0553   AST 14   ALT 12   ALKPHOS 62   BILITOT 0.55     Coagulation:   Recent Labs     09/05/20  0553   PROT 5.9*         Radiology Review:    Xr Abdomen (kub) (single Ap View)    Addendum Date: 9/1/2020    ADDENDUM:

## 2020-09-08 NOTE — PROGRESS NOTES
Occupational Therapy  Facility/Department: [facility] 4A STEPDOWN  Daily Treatment Note  NAME: Joenathan Schilder II  : 1960  MRN: [id number]    Date of Service: 2020    Discharge Recommendations:  Patient would benefit from continued therapy after discharge       Assessment   Performance deficits / Impairments: Decreased functional mobility ; Decreased safe awareness;Decreased ADL status; Decreased endurance;Decreased high-level IADLs;Decreased strength  Treatment Diagnosis: SBO and Nephrectomy  Prognosis: Good  Patient Education: OT POC, importance of participation in therapy, pursed lip breathing for pain mgnt, use of incentive spirometer - pt verbalized/demo understanding. Activity Tolerance  Activity Tolerance: Patient limited by pain  Safety Devices  Safety Devices in place: Yes  Type of devices: Call light within reach; Patient at risk for falls; Left in chair;Nurse notified         Patient Diagnosis(es): There were no encounter diagnoses. has a past medical history of Back pain, Cellulitis, Diabetes mellitus ([address] Gout, History of kidney cancer, and Hypertension. has a past surgical history that includes Ankle surgery; knee surgery (Bilateral); Carpal tunnel release (Bilateral); Cystoscopy (Right, 2020); total nephrectomy (Left, 2020); Abdominal exploration surgery (2020); hc cath power picc triple (9/3/2020); Kidney surgery (Left, 2020); and Small intestine surgery (N/A, 2020).     Restrictions  Restrictions/Precautions  Restrictions/Precautions: General Precautions, Surgical Protocols, Fall Risk, Up as Tolerated  Required Braces or Orthoses?: No  Position Activity Restriction  Other position/activity restrictions: up with assistance; G tube to gravity  Subjective   General  Chart Reviewed: Orders, Progress Notes, History and Physical, Imaging, Labs, Operative Notes  Patient assessed for rehabilitation services?: Yes  Family / Caregiver Present: Yes(wife)  General Comment  Pain Assessment  Pain Assessment: 0-10  Pain Level: 7  Pain Type: Acute pain;Surgical pain  Pain Location: Abdomen  Pain Descriptors: Aching;Discomfort;Sore;Sharp  Pain Frequency: Continuous  Non-Pharmaceutical Pain Intervention(s): Repositioned;Rest;Relaxation techniques; Therapeutic presence  Vital Signs  Patient Currently in Pain: Yes   Orientation  Orientation  Overall Orientation Status: Within Functional Limits  Objective    ADL  Grooming: Setup;Supervision(Oral care seated in chair.)  UE Bathing: Setup;Stand by assistance(wash face/hands seated in chair.)  Additional Comments: Pt seated in chair upon therapist arrival. Pt experiencing high pain level declining to stand from chair. Pt completed simple grooming seated in chair. Pt motivated to work with therapy but would like to get pain more under control. Balance  Sitting Balance: Supervision(Seated in chair.)  Cognition  Overall Cognitive Status: [facility]     Plan   Plan  Times per week: 4-5x/week  Current Treatment Recommendations: Safety Education & Training, Patient/Caregiver Education & Training, Self-Care / ADL, Functional Mobility Training, Equipment Evaluation, Education, & procurement, Endurance Training  Goals  Short term goals  Time Frame for Short term goals: By discharge:  Short term goal 1: Pt will complete bed mobility Independently. Short term goal 2: Pt will complete functional trasnfers/functional mobility Independently. Short term goal 3: Pt will demo UB ADLs with Mod I, use of AD/AE PRN. Short term goal 4: Pt will demo LB ADLs with Min A, use of AD/AE PRN.   Short term goal 5: Pt will demo ~15 minutes of standing tolerance during functional tasks to increase participation and independence in ADL/IADL task s       Therapy Time   Individual Concurrent Group Co-treatment   Time In 1448         Time Out 1516         Minutes 28         Timed Code Treatment Minutes: 24 Minutes     Pt seated in chair upon therapist arrival. Pleasant and agreeable to therapy. See above for LOF for all tasks. Pt retired to seated in chair at end of session with call light within reach.     JAYDEN Johnson/CEDRIC

## 2020-09-08 NOTE — CARE COORDINATION
Transitional planning. Spoke with pt and wife. [facility] chose [facility] for now. Referral made and spoke to Nieves in admissions    [address] calls from [facility]. They cannot take with TPN. Watch for needs. Will pass on to CM.

## 2020-09-08 NOTE — PROGRESS NOTES
Urology Progress Note  CC:  Left renal mass  Subjective:   Matt Pretty is a 56 y.o. male. His/Her current Diet is: PN-Adult 2-in-1 Central Line (Standard)  Diet NPO Effective Now Exceptions are: Sips with Meds, Ice Chips. The patient is 7 Days Post-Op from Procedure(s):  LAPAROSCOPIC XI ROBOTIC NEPHRECTOMY  EXPLORATORY LAPAROTOMY, RESECTION OF PROXIMAL JEJUNUM AND DESCENDING COLON WITH MOBILIZATION OF SPLENIC FLEXURE AND PRIMARY ANASTAMOSISOF SMALL BOWEL AND COLON, PLACEMENT OF GASTROSTOMY TUBE  No acute urologic events overnight.    No chest pain, shortness of breath, vomiting, fevers, chills  Had a small bowel movement  Ambulating up to chair   cc in 24 hours  Gastrostomy tube with 2500 cc/ connected to gravity    Patient Vitals for the past 24 hrs:   BP Temp Temp src Pulse Resp SpO2 Height Weight   09/08/20 0543 -- -- -- -- -- -- -- 275 lb 2.2 oz (124.8 kg)   09/08/20 0356 (!) 151/91 98.3 °F (36.8 °C) Oral 106 20 94 % -- --   09/07/20 2317 (!) 132/93 98.6 °F (37 °C) Oral 103 19 94 % -- --   09/07/20 2013 (!) 128/98 99 °F (37.2 °C) Oral 105 20 93 % -- --   09/07/20 1500 (!) 124/98 97.5 °F (36.4 °C) Oral 101 17 95 % -- --   09/07/20 1230 (!) 144/108 97.7 °F (36.5 °C) Oral 102 20 95 % -- --   09/07/20 1104 -- -- -- -- -- -- 5' 11\" (1.803 m) --   09/07/20 0830 -- -- -- 113 -- -- -- --   09/07/20 0800 -- 97.9 °F (36.6 °C) Oral 110 16 94 % -- --       Intake/Output Summary (Last 24 hours) at 9/8/2020 0647  Last data filed at 9/8/2020 0500  Gross per 24 hour   Intake 4323.77 ml   Output 3375 ml   Net 948.77 ml       Recent Labs     09/06/20  0603 09/07/20  0650 09/08/20  0512   WBC 14.7* 19.8* 18.1*   HGB 9.3* 10.4* 9.6*   HCT 29.2* 34.9* 32.0*   MCV 88.2 90.2 90.4    337 322     Recent Labs     09/06/20  0603 09/07/20  0650 09/08/20  0512    135 135   K 3.8 4.2 4.1    105 106   CO2 23 19* 21   PHOS 2.1* 2.1* 2.4*   BUN 28* 31* 30*   CREATININE 1.74* 1.55* 1.41*       No results for at 09/06/20 0740    0.9 % sodium chloride bolus  20 mL Intravenous Once Lisa Esqueda MD   Stopped at 09/04/20 0836    metoprolol (LOPRESSOR) injection 5 mg  5 mg Intravenous Q4H Matthew Hyatt MD   5 mg at 09/08/20 0603    fat emulsion 20 % infusion 250 mL  250 mL Intravenous Daily Faviola HidalgoDO leslye   Stopped at 09/08/20 6313    metoprolol (LOPRESSOR) injection 5 mg  5 mg Intravenous Q6H PRN Matthew Hyatt MD   5 mg at 09/07/20 1332    famotidine (PEPCID) injection 20 mg  20 mg Intravenous Daily Alpesh Montaño MD   20 mg at 09/07/20 0855    cefepime (MAXIPIME) 1 g IVPB minibag  1 g Intravenous Q12H Avni Velasco MD   Stopped at 09/08/20 0349    sodium chloride flush 0.9 % injection 10 mL  10 mL Intravenous 2 times per day Peggy Contreras MD   10 mL at 09/07/20 0844    sodium chloride flush 0.9 % injection 10 mL  10 mL Intravenous PRN Peggy Contreras MD        sodium chloride flush 0.9 % injection 10 mL  10 mL Intravenous 2 times per day Peggy Contreras MD   10 mL at 09/07/20 0844    sodium chloride flush 0.9 % injection 10 mL  10 mL Intravenous PRN Peggy Contreras MD        heparin (porcine) injection 5,000 Units  5,000 Units Subcutaneous 3 times per day Peggy Contreras MD   5,000 Units at 09/08/20 0603    metronidazole (FLAGYL) 500 mg in NaCl 100 mL IVPB premix  500 mg Intravenous Luisa Clark MD   Stopped at 09/08/20 0349    0.9 % sodium chloride infusion   Intravenous Continuous Matthew Hyatt  mL/hr at 09/07/20 2209      sodium chloride flush 0.9 % injection 10 mL  10 mL Intravenous 2 times per day Peggy Contreras MD   10 mL at 09/07/20 0845    sodium chloride flush 0.9 % injection 10 mL  10 mL Intravenous PRN Peggy Contreras MD        ondansetron (ZOFRAN) injection 4 mg  4 mg Intravenous Q6H PRN Peggy Contreras MD                Additional Lab/culture results:  BCx ng x 4 days   UCx no growth from 9/2  UCx no growth from 9/1    Physical Exam:

## 2020-09-08 NOTE — PLAN OF CARE
Problem: Falls - Risk of:  Goal: Will remain free from falls  Description: Will remain free from falls  9/8/2020 0234 by Carol Sultana RN  Outcome: Ongoing     Problem: Falls - Risk of:  Goal: Absence of physical injury  Description: Absence of physical injury  9/8/2020 0234 by Carol Sultana RN  Outcome: Ongoing     Problem: Serum Glucose Level - Abnormal:  Goal: Ability to maintain appropriate glucose levels will improve  Description: Ability to maintain appropriate glucose levels will improve  9/8/2020 0234 by Carol Sultana RN  Outcome: Ongoing     Problem: Bowel/Gastric:  Goal: Control of bowel function will improve  Description: Control of bowel function will improve  Outcome: Ongoing     Problem:  Bowel/Gastric:  Goal: Ability to achieve a regular elimination pattern will improve  Description: Ability to achieve a regular elimination pattern will improve  Outcome: Ongoing   Pt was stated passing lots of gas and had a small BM that was formed this am.  Electronically signed by Carol Sultana RN on 9/8/2020 at 2:36 AM

## 2020-09-09 LAB
ABSOLUTE EOS #: 0.34 K/UL (ref 0–0.4)
ABSOLUTE IMMATURE GRANULOCYTE: 1.03 K/UL (ref 0–0.3)
ABSOLUTE LYMPH #: 2.05 K/UL (ref 1–4.8)
ABSOLUTE MONO #: 1.71 K/UL (ref 0.1–0.8)
ANION GAP SERPL CALCULATED.3IONS-SCNC: 10 MMOL/L (ref 9–17)
BASOPHILS # BLD: 0 % (ref 0–2)
BASOPHILS ABSOLUTE: 0 K/UL (ref 0–0.2)
BUN BLDV-MCNC: 26 MG/DL (ref 8–23)
BUN/CREAT BLD: ABNORMAL (ref 9–20)
CALCIUM SERPL-MCNC: 8.3 MG/DL (ref 8.6–10.4)
CHLORIDE BLD-SCNC: 106 MMOL/L (ref 98–107)
CO2: 24 MMOL/L (ref 20–31)
CREAT SERPL-MCNC: 1.44 MG/DL (ref 0.7–1.2)
DIFFERENTIAL TYPE: ABNORMAL
EOSINOPHILS RELATIVE PERCENT: 2 % (ref 1–4)
GFR AFRICAN AMERICAN: >60 ML/MIN
GFR NON-AFRICAN AMERICAN: 50 ML/MIN
GFR SERPL CREATININE-BSD FRML MDRD: ABNORMAL ML/MIN/{1.73_M2}
GFR SERPL CREATININE-BSD FRML MDRD: ABNORMAL ML/MIN/{1.73_M2}
GLUCOSE BLD-MCNC: 145 MG/DL (ref 75–110)
GLUCOSE BLD-MCNC: 148 MG/DL (ref 75–110)
GLUCOSE BLD-MCNC: 150 MG/DL (ref 75–110)
GLUCOSE BLD-MCNC: 160 MG/DL (ref 75–110)
GLUCOSE BLD-MCNC: 162 MG/DL (ref 75–110)
GLUCOSE BLD-MCNC: 163 MG/DL (ref 75–110)
GLUCOSE BLD-MCNC: 163 MG/DL (ref 75–110)
GLUCOSE BLD-MCNC: 171 MG/DL (ref 70–99)
GLUCOSE BLD-MCNC: 238 MG/DL (ref 75–110)
HCT VFR BLD CALC: 30.9 % (ref 40.7–50.3)
HEMOGLOBIN: 9.6 G/DL (ref 13–17)
IMMATURE GRANULOCYTES: 6 %
LYMPHOCYTES # BLD: 12 % (ref 24–44)
MAGNESIUM: 2 MG/DL (ref 1.6–2.6)
MCH RBC QN AUTO: 27.1 PG (ref 25.2–33.5)
MCHC RBC AUTO-ENTMCNC: 31.1 G/DL (ref 28.4–34.8)
MCV RBC AUTO: 87.3 FL (ref 82.6–102.9)
MONOCYTES # BLD: 10 % (ref 1–7)
MORPHOLOGY: NORMAL
NRBC AUTOMATED: 0 PER 100 WBC
PDW BLD-RTO: 14.1 % (ref 11.8–14.4)
PHOSPHORUS: 2.9 MG/DL (ref 2.5–4.5)
PLATELET # BLD: 386 K/UL (ref 138–453)
PLATELET ESTIMATE: ABNORMAL
PMV BLD AUTO: 11.6 FL (ref 8.1–13.5)
POTASSIUM SERPL-SCNC: 4.3 MMOL/L (ref 3.7–5.3)
RBC # BLD: 3.54 M/UL (ref 4.21–5.77)
RBC # BLD: ABNORMAL 10*6/UL
SEG NEUTROPHILS: 70 % (ref 36–66)
SEGMENTED NEUTROPHILS ABSOLUTE COUNT: 11.97 K/UL (ref 1.8–7.7)
SODIUM BLD-SCNC: 140 MMOL/L (ref 135–144)
WBC # BLD: 17.1 K/UL (ref 3.5–11.3)
WBC # BLD: ABNORMAL 10*3/UL

## 2020-09-09 PROCEDURE — G0108 DIAB MANAGE TRN  PER INDIV: HCPCS

## 2020-09-09 PROCEDURE — 84100 ASSAY OF PHOSPHORUS: CPT

## 2020-09-09 PROCEDURE — 85025 COMPLETE CBC W/AUTO DIFF WBC: CPT

## 2020-09-09 PROCEDURE — 6370000000 HC RX 637 (ALT 250 FOR IP): Performed by: STUDENT IN AN ORGANIZED HEALTH CARE EDUCATION/TRAINING PROGRAM

## 2020-09-09 PROCEDURE — 2580000003 HC RX 258: Performed by: STUDENT IN AN ORGANIZED HEALTH CARE EDUCATION/TRAINING PROGRAM

## 2020-09-09 PROCEDURE — 2500000003 HC RX 250 WO HCPCS: Performed by: STUDENT IN AN ORGANIZED HEALTH CARE EDUCATION/TRAINING PROGRAM

## 2020-09-09 PROCEDURE — 6360000002 HC RX W HCPCS: Performed by: STUDENT IN AN ORGANIZED HEALTH CARE EDUCATION/TRAINING PROGRAM

## 2020-09-09 PROCEDURE — 97116 GAIT TRAINING THERAPY: CPT

## 2020-09-09 PROCEDURE — 36415 COLL VENOUS BLD VENIPUNCTURE: CPT

## 2020-09-09 PROCEDURE — 97110 THERAPEUTIC EXERCISES: CPT

## 2020-09-09 PROCEDURE — 83735 ASSAY OF MAGNESIUM: CPT

## 2020-09-09 PROCEDURE — 80048 BASIC METABOLIC PNL TOTAL CA: CPT

## 2020-09-09 PROCEDURE — 2060000000 HC ICU INTERMEDIATE R&B

## 2020-09-09 PROCEDURE — 99233 SBSQ HOSP IP/OBS HIGH 50: CPT | Performed by: INTERNAL MEDICINE

## 2020-09-09 RX ORDER — INSULIN GLARGINE 100 [IU]/ML
75 INJECTION, SOLUTION SUBCUTANEOUS 2 TIMES DAILY
Status: DISCONTINUED | OUTPATIENT
Start: 2020-09-09 | End: 2020-09-09

## 2020-09-09 RX ORDER — LABETALOL HYDROCHLORIDE 5 MG/ML
5 INJECTION, SOLUTION INTRAVENOUS EVERY 6 HOURS PRN
Status: DISCONTINUED | OUTPATIENT
Start: 2020-09-09 | End: 2020-09-09

## 2020-09-09 RX ORDER — INSULIN GLARGINE 100 [IU]/ML
50 INJECTION, SOLUTION SUBCUTANEOUS NIGHTLY
Status: DISCONTINUED | OUTPATIENT
Start: 2020-09-09 | End: 2020-09-10

## 2020-09-09 RX ADMIN — INSULIN GLARGINE 50 UNITS: 100 INJECTION, SOLUTION SUBCUTANEOUS at 20:25

## 2020-09-09 RX ADMIN — METRONIDAZOLE 500 MG: 500 INJECTION, SOLUTION INTRAVENOUS at 03:34

## 2020-09-09 RX ADMIN — SODIUM CHLORIDE, PRESERVATIVE FREE 10 ML: 5 INJECTION INTRAVENOUS at 09:26

## 2020-09-09 RX ADMIN — METOPROLOL TARTRATE 5 MG: 1 INJECTION, SOLUTION INTRAVENOUS at 15:58

## 2020-09-09 RX ADMIN — GABAPENTIN 300 MG: 300 CAPSULE ORAL at 22:52

## 2020-09-09 RX ADMIN — ACETAMINOPHEN 1000 MG: 500 TABLET ORAL at 06:37

## 2020-09-09 RX ADMIN — HYDROMORPHONE HYDROCHLORIDE 0.5 MG: 1 INJECTION, SOLUTION INTRAMUSCULAR; INTRAVENOUS; SUBCUTANEOUS at 20:35

## 2020-09-09 RX ADMIN — INSULIN LISPRO 3 UNITS: 100 INJECTION, SOLUTION INTRAVENOUS; SUBCUTANEOUS at 14:16

## 2020-09-09 RX ADMIN — METOCLOPRAMIDE 10 MG: 5 INJECTION, SOLUTION INTRAMUSCULAR; INTRAVENOUS at 18:25

## 2020-09-09 RX ADMIN — METRONIDAZOLE 500 MG: 500 INJECTION, SOLUTION INTRAVENOUS at 12:31

## 2020-09-09 RX ADMIN — HYDROMORPHONE HYDROCHLORIDE 0.5 MG: 1 INJECTION, SOLUTION INTRAMUSCULAR; INTRAVENOUS; SUBCUTANEOUS at 00:30

## 2020-09-09 RX ADMIN — SODIUM CHLORIDE 300 MG: 9 INJECTION, SOLUTION INTRAVENOUS at 09:25

## 2020-09-09 RX ADMIN — METOPROLOL TARTRATE 5 MG: 1 INJECTION, SOLUTION INTRAVENOUS at 09:25

## 2020-09-09 RX ADMIN — ACETAMINOPHEN 1000 MG: 500 TABLET ORAL at 23:02

## 2020-09-09 RX ADMIN — SODIUM CHLORIDE: 9 INJECTION, SOLUTION INTRAVENOUS at 06:35

## 2020-09-09 RX ADMIN — SODIUM CHLORIDE 3.09 UNITS/HR: 9 INJECTION, SOLUTION INTRAVENOUS at 09:48

## 2020-09-09 RX ADMIN — GABAPENTIN 300 MG: 300 CAPSULE ORAL at 14:14

## 2020-09-09 RX ADMIN — METOCLOPRAMIDE 10 MG: 5 INJECTION, SOLUTION INTRAMUSCULAR; INTRAVENOUS at 01:34

## 2020-09-09 RX ADMIN — METOCLOPRAMIDE 10 MG: 5 INJECTION, SOLUTION INTRAMUSCULAR; INTRAVENOUS at 06:37

## 2020-09-09 RX ADMIN — HYDROMORPHONE HYDROCHLORIDE 0.5 MG: 1 INJECTION, SOLUTION INTRAMUSCULAR; INTRAVENOUS; SUBCUTANEOUS at 06:37

## 2020-09-09 RX ADMIN — METHOCARBAMOL TABLETS 750 MG: 750 TABLET, COATED ORAL at 22:52

## 2020-09-09 RX ADMIN — METHOCARBAMOL TABLETS 750 MG: 750 TABLET, COATED ORAL at 12:32

## 2020-09-09 RX ADMIN — HEPARIN SODIUM 5000 UNITS: 5000 INJECTION INTRAVENOUS; SUBCUTANEOUS at 14:14

## 2020-09-09 RX ADMIN — CALCIUM GLUCONATE: 98 INJECTION, SOLUTION INTRAVENOUS at 17:45

## 2020-09-09 RX ADMIN — I.V. FAT EMULSION 250 ML: 20 EMULSION INTRAVENOUS at 17:44

## 2020-09-09 RX ADMIN — Medication 10 ML: at 09:26

## 2020-09-09 RX ADMIN — Medication 10 ML: at 20:29

## 2020-09-09 RX ADMIN — ACETAMINOPHEN 1000 MG: 500 TABLET ORAL at 14:14

## 2020-09-09 RX ADMIN — METOPROLOL TARTRATE 5 MG: 1 INJECTION, SOLUTION INTRAVENOUS at 22:52

## 2020-09-09 RX ADMIN — METOCLOPRAMIDE 10 MG: 5 INJECTION, SOLUTION INTRAMUSCULAR; INTRAVENOUS at 12:37

## 2020-09-09 RX ADMIN — OXYCODONE HYDROCHLORIDE 5 MG: 5 TABLET ORAL at 03:34

## 2020-09-09 RX ADMIN — METHOCARBAMOL TABLETS 750 MG: 750 TABLET, COATED ORAL at 06:35

## 2020-09-09 RX ADMIN — FAMOTIDINE 20 MG: 10 INJECTION INTRAVENOUS at 09:25

## 2020-09-09 RX ADMIN — METHOCARBAMOL TABLETS 750 MG: 750 TABLET, COATED ORAL at 01:34

## 2020-09-09 RX ADMIN — CEFEPIME HYDROCHLORIDE 1 G: 1 INJECTION, POWDER, FOR SOLUTION INTRAMUSCULAR; INTRAVENOUS at 02:34

## 2020-09-09 RX ADMIN — OXYCODONE HYDROCHLORIDE 5 MG: 5 TABLET ORAL at 18:24

## 2020-09-09 RX ADMIN — METRONIDAZOLE 500 MG: 500 INJECTION, SOLUTION INTRAVENOUS at 18:25

## 2020-09-09 RX ADMIN — CEFEPIME HYDROCHLORIDE 1 G: 1 INJECTION, POWDER, FOR SOLUTION INTRAMUSCULAR; INTRAVENOUS at 15:56

## 2020-09-09 RX ADMIN — METOPROLOL TARTRATE 5 MG: 1 INJECTION, SOLUTION INTRAVENOUS at 02:34

## 2020-09-09 RX ADMIN — OXYCODONE HYDROCHLORIDE 5 MG: 5 TABLET ORAL at 10:53

## 2020-09-09 RX ADMIN — METOPROLOL TARTRATE 5 MG: 1 INJECTION, SOLUTION INTRAVENOUS at 06:37

## 2020-09-09 RX ADMIN — HYDROMORPHONE HYDROCHLORIDE 0.5 MG: 1 INJECTION, SOLUTION INTRAMUSCULAR; INTRAVENOUS; SUBCUTANEOUS at 12:32

## 2020-09-09 RX ADMIN — HEPARIN SODIUM 5000 UNITS: 5000 INJECTION INTRAVENOUS; SUBCUTANEOUS at 06:36

## 2020-09-09 RX ADMIN — HEPARIN SODIUM 5000 UNITS: 5000 INJECTION INTRAVENOUS; SUBCUTANEOUS at 22:52

## 2020-09-09 RX ADMIN — INSULIN LISPRO 6 UNITS: 100 INJECTION, SOLUTION INTRAVENOUS; SUBCUTANEOUS at 20:26

## 2020-09-09 RX ADMIN — GABAPENTIN 300 MG: 300 CAPSULE ORAL at 06:35

## 2020-09-09 RX ADMIN — SODIUM CHLORIDE: 9 INJECTION, SOLUTION INTRAVENOUS at 15:56

## 2020-09-09 RX ADMIN — METHOCARBAMOL TABLETS 750 MG: 750 TABLET, COATED ORAL at 18:24

## 2020-09-09 ASSESSMENT — PAIN DESCRIPTION - FREQUENCY
FREQUENCY: CONTINUOUS
FREQUENCY: CONTINUOUS

## 2020-09-09 ASSESSMENT — PAIN SCALES - GENERAL
PAINLEVEL_OUTOF10: 4
PAINLEVEL_OUTOF10: 2
PAINLEVEL_OUTOF10: 7
PAINLEVEL_OUTOF10: 0
PAINLEVEL_OUTOF10: 6
PAINLEVEL_OUTOF10: 7
PAINLEVEL_OUTOF10: 6
PAINLEVEL_OUTOF10: 5
PAINLEVEL_OUTOF10: 7
PAINLEVEL_OUTOF10: 6
PAINLEVEL_OUTOF10: 5
PAINLEVEL_OUTOF10: 7
PAINLEVEL_OUTOF10: 7
PAINLEVEL_OUTOF10: 5

## 2020-09-09 ASSESSMENT — PAIN DESCRIPTION - ONSET
ONSET: ON-GOING
ONSET: ON-GOING

## 2020-09-09 ASSESSMENT — PAIN DESCRIPTION - LOCATION
LOCATION: ABDOMEN

## 2020-09-09 ASSESSMENT — PAIN DESCRIPTION - PAIN TYPE
TYPE: ACUTE PAIN;SURGICAL PAIN
TYPE: ACUTE PAIN
TYPE: ACUTE PAIN

## 2020-09-09 ASSESSMENT — PAIN DESCRIPTION - ORIENTATION
ORIENTATION: MID
ORIENTATION: MID

## 2020-09-09 ASSESSMENT — PAIN DESCRIPTION - DESCRIPTORS
DESCRIPTORS: CRAMPING
DESCRIPTORS: CRAMPING

## 2020-09-09 NOTE — PROGRESS NOTES
Writer did not see orders for q4h BMP, Mg, and Phos. The last BMP that was drawn was with AM labs. Writer contacted On Call Internal Med Intern via perfect serve to ask for orders per DKA protocol, though the patient is not in DKA, he is on an insulin gtt. : One time orders were put in for a BMP, Mg, and Phos. Writer contacted on call internal med intern to see if they should be changed to q4h, writer was told not at this time. : K: 4.0 M.1  Phos: 2.8. Writer contacted on call internal intern because there are no PRN orders for replacements. The on call intern med intern told the writer there will be no replacements at this time. Writer verified that even per DKA protocol there will be no replacements at this time and that the next BMP, Mg, and Phos will be drawn with AM labs.

## 2020-09-09 NOTE — PROGRESS NOTES
Per. Dr. Anabelle Davila d/c insulin drip do not give. Ordered 10 a.m. dose of Lantus. Wait and give the 50 units to night. Do high dose sliding scale for now.

## 2020-09-09 NOTE — PROGRESS NOTES
General Surgery:  Daily Progress Note    PATIENT NAME: Charo Bustos II     TODAY'S DATE: 9/9/2020, 6:04 AM    SUBJECTIVE:     Pt seen and examined at bedside. No acute events overnight. Pt is improving overall. States feeling better this am. Passing flatus and having multiple bm. Started on insulin gtt yesterday, bg now < 180. He is afebrile. Up oob, ambulating. Pain controlled. OBJECTIVE:   VITALS:  BP (!) 139/95   Pulse 98   Temp 99.7 °F (37.6 °C) (Oral)   Resp 17   Ht 5' 11\" (1.803 m)   Wt 279 lb 5.2 oz (126.7 kg)   SpO2 94%   BMI 38.96 kg/m²      INTAKE/OUTPUT:      Intake/Output Summary (Last 24 hours) at 9/9/2020 0604  Last data filed at 9/9/2020 8164  Gross per 24 hour   Intake 8296 ml   Output 4310 ml   Net 3986 ml       PHYSICAL EXAM:  General Appearance: Alert, oriented. HEENT:  Normocephalic, atraumatic, mucus membranes moist.   Heart: s1+s2  Lungs: equal and symmetric chest rise/fall non-labored   Abdomen:soft, nd, attp. Gastrostomy tube clamped. Incision c/d/i w/ staples. Extremities: No cyanosis, pitting edema, rashes noted. Skin: Skin color, texture, turgor normal. No rashes or lesions. Data:  CBC:   Recent Labs     09/07/20  0650 09/08/20  0512 09/09/20  0510   WBC 19.8* 18.1* 17.1*   HGB 10.4* 9.6* 9.6*    322 386     Chemistry:   Recent Labs     09/08/20  0512 09/08/20 2013 09/09/20  0510    138 140   K 4.1 4.0 4.3    107 106   CO2 21 20 24   GLUCOSE 244* 133* 171*   BUN 30* 29* 26*   CREATININE 1.41* 1.39* 1.44*   MG 2.0 2.1 2.0   ANIONGAP 8* 11 10   LABGLOM 51* 52* 50*   GFRAA >60 >60 >60   CALCIUM 8.2* 8.2* 8.3*   PHOS 2.4* 2.8 2.9     Hepatic:   No results for input(s): AST, ALT, ALB, ALKPHOS, BILITOT, BILIDIR in the last 72 hours. Coagulation:   No results for input(s): APTT, PROT, INR in the last 72 hours.       Radiology Review:    Xr Abdomen (kub) (single Ap View)    Addendum Date: 9/1/2020    ADDENDUM: Dose area product 1037.1 mGy per cm squared and fluoro time 5.6 seconds     Result Date: 9/1/2020  EXAMINATION: ONE SUPINE XRAY VIEW(S) OF THE ABDOMEN 9/1/2020 9:32 pm COMPARISON: 16 hours ago HISTORY: ORDERING SYSTEM PROVIDED HISTORY: cysto with right stent TECHNOLOGIST PROVIDED HISTORY: cysto with right stent Reason for Exam: cysto Acuity: Acute Type of Exam: Initial FINDINGS: Double-J ureteral stent present. On the contralateral side there appears to be a nephrolith. Please correlate with clinical service     Xr Chest Portable    Result Date: 9/1/2020  EXAMINATION: ONE XRAY VIEW OF THE CHEST 9/1/2020 8:31 pm COMPARISON: 05/14/2010 HISTORY: ORDERING SYSTEM PROVIDED HISTORY: evaluation TECHNOLOGIST PROVIDED HISTORY: evaluation Reason for Exam: evaluation Acuity: Acute Type of Exam: Initial FINDINGS: Cardiomediastinal silhouette is unchanged in size. No pulmonary consolidation, pleural effusion, or pneumothorax. No acute osseous abnormality. Enteric tube is below the diaphragm with tip outside the field of view. No acute cardiopulmonary abnormality. Xr Abdomen For Ng/og/ne Tube Placement    Result Date: 9/1/2020  EXAMINATION: ONE SUPINE XRAY VIEW(S) OF THE ABDOMEN 9/1/2020 6:26 am COMPARISON: 08/31/2020 HISTORY: ORDERING SYSTEM PROVIDED HISTORY: Confirmation of course of NG/OG/NE tube and location of tip of tube TECHNOLOGIST PROVIDED HISTORY: Confirmation of course of NG/OG/NE tube and location of tip of tube Portable? ->Yes Reason for Exam: ng placement Acuity: Unknown Type of Exam: Unknown FINDINGS: The nasogastric tube proximal side port is in the proximal stomach. The distal tip is in the mid gastric body. There is no evidence of bowel obstruction. There is a stable left renal calculus. No acute osseous abnormality is seen. The distal end of the nasogastric tube is in the mid gastric body. Fluoro For Surgical Procedures    Result Date: 9/1/2020  Radiology exam is complete. No Radiologist dictation.  Please follow up with ordering provider. ASSESSMENT:  Active Hospital Problems    Diagnosis Date Noted    Severe malnutrition ([facility] [address] [E43] 09/07/2020    Small bowel obstruction ([facility] [K56.609] 09/01/2020       1. 56 y.o. male with bowel obstruction secondary to renal mass  2. POD#8 R nephrectomy, SB resection with primary anastomosis, left partial colectomy with primary anastomosis, open gastrectomy tube  - ileus     Plan:  1. Pt is starting to improve overall. He now has return of bowel function. Today will clamp his G tube if tolerates will start on cld w/ supplements and advance as tolerated. Continue the regalan for now. Minimize narcotics.  Keep blood glucose < 180.   2. Medical and supportive care per primary           Electronically signed by Lui Ramires DO  on 9/9/2020 at 6:04 AM

## 2020-09-09 NOTE — PROGRESS NOTES
1.44*       No results for input(s): COLORU, PHUR, LABCAST, WBCUA, RBCUA, MUCUS, TRICHOMONAS, YEAST, BACTERIA, CLARITYU, SPECGRAV, LEUKOCYTESUR, UROBILINOGEN, [place] in the last 72 hours.     Invalid input(s): NITRATE, GLUCOSEUKETONESUAMORPHOUS    Current Facility-Administered Medications   Medication Dose Route Frequency Provider Last Rate Last Dose    metoclopramide (REGLAN) injection 10 mg  10 mg Intravenous Q6H Annalisa Marte, DO   10 mg at 09/09/20 2569    insulin regular (HUMULIN R;NOVOLIN R) 100 Units in sodium chloride 0.9 % 100 mL infusion  0.5 Units/hr Intravenous Continuous Annalisa Marte DO 3.09 mL/hr at 09/09/20 0627 3.09 Units/hr at 09/09/20 6181    iron sucrose (VENOFER) 300 mg in sodium chloride 0.9 % 250 mL IVPB  300 mg Intravenous Q24H Davida Caraballo MD   Stopped at 09/08/20 1305    PN-Adult 2-in-1 Central Line (Standard)   Intravenous Continuous TPN Annalisa Marte DO 65 mL/hr at 09/08/20 1817      albuterol (PROVENTIL) nebulizer solution 2.5 mg  2.5 mg Nebulization Q6H PRN Stephan Cline MD        glucose (GLUTOSE) 40 % oral gel 15 g  15 g Oral PRN Orlando Dobson MD        dextrose 50 % IV solution  12.5 g Intravenous PRN Orlando Dobson MD        glucagon (rDNA) injection 1 mg  1 mg Intramuscular PRN Orlando Dobson MD        dextrose 5 % solution  100 mL/hr Intravenous PRN Orlando Dobson MD        HYDROmorphone (DILAUDID) injection 0.5 mg  0.5 mg Intravenous Q6H PRN Annalisa Marte DO   0.5 mg at 09/09/20 7127    [Held by provider] insulin lispro (HUMALOG) injection vial 0-18 Units  0-18 Units Subcutaneous Q6H Jerad Robins DO   6 Units at 09/08/20 0230    [Held by provider] insulin lispro (HUMALOG) injection vial 0-9 Units  0-9 Units Subcutaneous Nightly Jerad Robins DO        acetaminophen (TYLENOL) tablet 1,000 mg  1,000 mg Oral Q8H Annalisa Marte, DO   1,000 mg at 09/09/20 0951    methocarbamol (ROBAXIN) tablet 750 mg  750 mg Oral Q6H Annalisa Marte, DO   750 mg at 09/09/20 0635    gabapentin (NEURONTIN) capsule 300 mg  300 mg Oral Q8H Da Silva Dull, DO   300 mg at 09/09/20 0712    oxyCODONE (ROXICODONE) immediate release tablet 5 mg  5 mg Oral Q4H PRN Da Silva Dull, DO   5 mg at 09/09/20 0334    0.9 % sodium chloride bolus  20 mL Intravenous Once Leidy Newmna MD   Stopped at 09/04/20 0836    metoprolol (LOPRESSOR) injection 5 mg  5 mg Intravenous Q4H Matthew Hyatt MD   5 mg at 09/09/20 2468    fat emulsion 20 % infusion 250 mL  250 mL Intravenous Daily Da Silva Dull, DO 21 mL/hr at 09/08/20 1822 250 mL at 09/08/20 1822    metoprolol (LOPRESSOR) injection 5 mg  5 mg Intravenous Q6H PRN Matthew Hyatt MD   5 mg at 09/07/20 1332    famotidine (PEPCID) injection 20 mg  20 mg Intravenous Daily Matthew Hyatt MD   20 mg at 09/08/20 0853    cefepime (MAXIPIME) 1 g IVPB minibag  1 g Intravenous Q12H Rhett Nayak MD   Stopped at 09/09/20 0304    sodium chloride flush 0.9 % injection 10 mL  10 mL Intravenous 2 times per day Teo Hudson MD   10 mL at 09/08/20 0905    sodium chloride flush 0.9 % injection 10 mL  10 mL Intravenous PRN Teo Hudson MD        sodium chloride flush 0.9 % injection 10 mL  10 mL Intravenous 2 times per day Teo Hudson MD   10 mL at 09/08/20 0905    sodium chloride flush 0.9 % injection 10 mL  10 mL Intravenous PRN Teo Hudson MD        heparin (porcine) injection 5,000 Units  5,000 Units Subcutaneous 3 times per day Teo Hudson MD   5,000 Units at 09/09/20 0636    metronidazole (FLAGYL) 500 mg in NaCl 100 mL IVPB premix  500 mg Intravenous Temo George MD   Stopped at 09/09/20 0434    0.9 % sodium chloride infusion   Intravenous Continuous Viktoriya Molina  mL/hr at 09/09/20 0635      sodium chloride flush 0.9 % injection 10 mL  10 mL Intravenous 2 times per day Teo Hudson MD   10 mL at 09/08/20 0905    sodium chloride flush 0.9 % injection 10 mL  10 mL Intravenous PRN Teo Hudson, MD        ondansetron (ZOFRAN) injection 4 mg  4 mg Intravenous Q6H PRN Corrine Boeck, MD                Additional Lab/culture results:  BCx 8/31 ng x 6 days   BCx 9/2 ng x 6 days   UCx no growth from 9/2  UCx no growth from 9/1  UCx no growth from 8/31    Physical Exam:   NAD  AOx3  Peripheral pulses palpable  Respirations nonlabored, symmetric chest rise bilaterally, on nasal cannula oxygen  Soft, appropriately tender, tympanic to percussion /midline incisions with gaps between staples, mild erythema, no expressible fluid. Other incisions c/d/i  G tube with bilious drainage and currently clamped. No calf ttp bilaterally  EPC cuffs on and functioning billaterally      Interval Imaging Findings:    Impression:   Jan Casiano II is a 56 y.o. male with   Problem(s):  POD 7 from  Laparoscopic robotic nephrectomy and exploratory laparotomy with bowel resection of proximal jejunum and descending colon  - Acute blood loss anemia   - small bowel obstruction likely secondary to mesenteric invasion of tumor  - acute kidney injury on CKD stage IIIA (baseline Cr 1.4)  - right distal ureteral stone s/p cystoscopy and right ureteral stent placement by Dr Martinez  - platelet dysfunction on aspirin 81 mg last dose 8/28/20  - comorbid conditions: diabetes mellitus type 2, HTN, obesity and chronic kidney disease.  - Pathology: Negative for malignancy, Left Kidney with xanthogranulomatous pyelonephritis     Plan:   Diet:  NPO w/ sips, having G tube clamped this AM. Diet per general surgery. Treatment of right ureteral calculi in a few weeks once recovered on an outpatient basis, maintain stent. I spoke with patient and wife and discussed pathology as well as answered all questions. They did ask appropriate questions. IV fluids : at 100 ml/  Hour and TPN for nutrition for healing. Antibiotics: On cefepime  and Flagyl  Leukocytosis slightly improving 17 from 18. CT abd/pel form 9/7 without abscess.   Hemoglobin trend is stable at 9.6 from 9.6  DEBBI resolving, creatinine 1.4 which may be his new baseline given loss of nephrons and apparent CKD at baseline  Hep 5000 U q8 hrs   Pain control:  As needed Roxicodone and Dilaudid  Ambulation / IS 10 times per hour , physical therapy      Deangelo Sharif MD  Urology Resident, PGY-4  6:55 AM 9/9/2020

## 2020-09-09 NOTE — CARE COORDINATION
TRANSITIONAL CARE PLANNING/ 2 [facility] [address] Day: 8    Reason for Admission: Small bowel obstruction ([facility] [address] [K56.609]  Small bowel obstruction ([facility] [address] [T73.323]     Treatment Plan of Care: clamp G tube, start CLD w/ supplements, wean insulin gtt    Tests/Procedures still needed: n/a    Barriers to Discharge: TPN    Readmission Risk              Risk of Unplanned Readmission:        29            Patient goals/Treatment Preferences/Transitional Plan: Referral to [address] [place] [address] - unable to accept TPN. Patient ambulated 120' today. Potential candidate for home with HC and TPN. Referral sent to [facility] for potential discharge home. Will discuss with patient and wife.     Referrals Made: [facility], [place]     Follow Up needed: [place]

## 2020-09-09 NOTE — PROGRESS NOTES
Comprehensive Nutrition Assessment    Type and Reason for Visit:  Reassess    Nutrition Recommendations/Plan:   - Continue TPN. Increase AA to 110 g in next bag. Will continue 25 units insulin in TPN. Will provide 1688 kcals and 110 g protein/day. - Will continue to monitor labs and modify TPN as needed. - Monitor for start of liquid diet. Nutrition Assessment:  Pt's insulin drip d/c this afternoon, RN to give the 50 units tonight. TPN running at 65 mL/hr, RN states plan to progress to clear liquids today. Pt tolerating g-tube clamping. Labs reviewed. Last BM: 9/9. Will continue to follow. Malnutrition Assessment:  Malnutrition Status:  Severe malnutrition    Context:  Chronic Illness     Findings of the 6 clinical characteristics of malnutrition:  Energy Intake:  7 - 75% or less estimated energy requirements for 1 month or longer  Weight Loss:  7 - Greater than 10% over 6 months     Body Fat Loss:  No significant body fat loss     Muscle Mass Loss:  No significant muscle mass loss    Fluid Accumulation:  No significant fluid accumulation     Strength:  Not Performed    Estimated Daily Nutrient Needs:  Energy (kcal):  8496-3032 kcal/day; Weight Used for Energy Requirements:  Ideal     Protein (g):  1.3 - 1.5 = 100-120 g/day; Weight Used for Protein Requirements:  Ideal(1.2)      Nutrition Related Findings:  Glucose 133-171, BUN 26, & creatinine 1.44. Meds reviewed. Last BM: 9/9.       Wounds:  Surgical Wound       Current Nutrition Therapies:    Diet NPO Effective Now Exceptions are: Sips with Meds, Ice Chips  PN-Adult 2-in-1 Central Line (Standard)  Current Parenteral Nutrition Orders:  · Type and Formula: 2-in-1 Custom(220 gm dextrose, 95 gm AA)   · Lipids: 250ml, Daily  · Duration: Continuous  · Rate/Volume: 65 mL/hr; 1560 mLs/d  · Current PN Order Provides: 220 gm dextrose, 95 gm AA + 250 mL 20% IL = 1560 kcals, 95 gm protein   ·   Anthropometric Measures:  · Height: 5' 11\" (180.3 cm)  · Current Body Weight: 279 lb 5.2 oz (126.7 kg)   · Admission Body Weight: 257 lb (116.6 kg)    · Usual Body Weight: 294 lb (133.4 kg)(3/16/20 per EHR)     · Ideal Body Weight: 172 lbs; % Ideal Body Weight 162.4 %   · BMI: 39  · BMI Categories: Obese Class 2 (BMI 35.0 -39.9)       Nutrition Diagnosis:   · Inadequate oral intake related to altered GI function as evidenced by NPO or clear liquid status due to medical condition, nutrition support - parenteral nutrition    Nutrition Interventions:   Food and/or Nutrient Delivery:  Modify Parenteral Nutrition, Start Oral Diet  Nutrition Education/Counseling:  No recommendation at this time   Coordination of Nutrition Care:  Continued Inpatient Monitoring    Goals: Goal Met  meet % of estimated nutrition needs       Nutrition Monitoring and Evaluation:   Behavioral-Environmental Outcomes:  (N/A)   Food/Nutrient Intake Outcomes:  Diet Advancement/Tolerance, Parenteral Nutrition Intake/Tolerance  Physical Signs/Symptoms Outcomes:  GI Status, Biochemical Data, Nutrition Focused Physical Findings, Weight     Discharge Planning:     Too soon to determine     Electronically signed by BLANK Agee on 9/9/20 at 2:18 PM EDT    Contact: [phone]

## 2020-09-09 NOTE — PROGRESS NOTES
Paged  Attending regarding insulin drip oreder to wean from Dr. Khris Hughes. I have page 2 times and o response or order clarification. [phone] From: Priscila Maguire 81 4A RE: Shari Duval I have an order to wean Insulin drip but no parameter for doing so. I have paged Dr. Khris Hughes 2 times regarding her order with no response.  Drip is still going

## 2020-09-09 NOTE — CARE COORDINATION
Transitional Planning  Spoke to patient and wife Carol Hennessy ([phone]) about discharge. Notified that [facility] would be unable to accept with TPN. They are open to discharge home with home health. Discussed home health options. Given choice, she would like agency in-network that has flexibility as she works evenings. Discussed ambulation with rolling walker. They will obtain walker from family. Received call from [facility]/ Jodee Marks  ([phone]) offering assistance with discharge planning. Covered home care agencies are Pending sale to [facility] and [address] [place] [address]. Referral sent to [facility]. Referral sent to [facility] for TPN coverage review. Return call from [address] [facility] ([phone]), patient is covered 100% for home TPN. Will need to confirm following physician for post-discharge.

## 2020-09-09 NOTE — PLAN OF CARE
Problem: Skin Integrity:  Goal: Will show no infection signs and symptoms  Description: Will show no infection signs and symptoms  Outcome: Met This Shift     Problem: Falls - Risk of:  Goal: Will remain free from falls  Description: Will remain free from falls  Outcome: Met This Shift  Note: Patient's bed remained locked and in lowest position throughout shift. Patient belonings and call light remain within reach. 2/4 side rails are up, and non-skid footwear is on. Problem: Serum Glucose Level - Abnormal:  Goal: Ability to maintain appropriate glucose levels will improve  Description: Ability to maintain appropriate glucose levels will improve  Outcome: Ongoing     Problem: Nutrition  Goal: Optimal nutrition therapy  Description: Nutrition Problem #1: Inadequate oral intake  Intervention: Food and/or Nutrient Delivery: Continue NPO. Start nutrition as able; Monitor plans re:PICC placement/ initiation of parenteral nutrition support. Nutritional Goals: meet % of estimated nutrition needs     Outcome: Ongoing     Problem: Pain:  Description: Pain management should include both nonpharmacologic and pharmacologic interventions. Goal: Pain level will decrease  Description: Pain level will decrease  Outcome: Ongoing  Note: Patient rated pain a  5 on a scale from 0-10. Writer administered scheduled PRN pain medications to patient. Problem:  Bowel/Gastric:  Goal: Control of bowel function will improve  Description: Control of bowel function will improve  Outcome: Ongoing   Electronically signed by Preeti Chapman RN on 9/9/2020 at 3:24 AM

## 2020-09-09 NOTE — PROGRESS NOTES
Domiinck Mady  Internal Medicine Teaching Residency Program  Inpatient Daily Progress Note  ______________________________________________________________________________    Patient: Mady Pantoja II  YOB: 1960   SPS:[id number]    Acct: [de-identified]     Room: [address] [place] [address]  Admit date: 9/1/2020  Today's date: 09/09/20  Number of days in the hospital: 8    SUBJECTIVE   Admitting Diagnosis: <principal problem not specified>  CC: abdominal pain  Nausea   vomiting  Pt examined at bedside. Chart & results reviewed. Patient had no acute events overnight. Patient is on insulin infusion for tight blood sugar control. Patient had potassium of 4.3 phosphorus of 2.9 and magnesium of 2. Repeat BMP  and phosphorus today again. Patient had a bowel movement today in the morning. ROS:  Constitutional:  negative for chills, fevers, sweats  Respiratory:  negative for cough, dyspnea on exertion, hemoptysis, shortness of breath, wheezing  Cardiovascular:  negative for chest pain, chest pressure/discomfort, lower extremity edema, palpitations  Gastrointestinal: Positive for abdominal pain, constipation and negative for diarrhea, nausea, vomiting  Neurological:  negative for dizziness, headache  BRIEF HISTORY   Patient originally presented to [facility] on 08/31 with symptoms of abdominal distention, abdominal pain, nausea emesis and inability to tolerate p.o. diet found to be severely dehydrated with acute kidney injury on CKD, elevated blood glucose in 400, leukocytosis 20,000 and tachycardic.  CT abdomen pelvis was completed which demonstrated left renal mass 4.8 x 6.9 x 9.9 cm eroding into and causing small bowel obstruction.    On the CT abdomen patient was also noted to have a right ureteral stone 5 x 11 mm.   Patient was admitted to the ICU and general surgery was consulted along with urology at [address] was decompressed with NG tube, placed on IV fluids and insulin drip. Pt was also started on cefepime and flagyl for empiric sepsis coverage. Prior to transfer to [facility] this morning patient received cystoscopy and right ureteral stent.   During ICU stay patient underwent laparoscopic robotic nephrectomy and   Exploratory laparotomy bowel resection of proximal jejunum and descending colon with mobilization of splenic flexure and primary anastomosis of small bowel and colon along with placement of gastrostomy tube. OBJECTIVE     Vital Signs:  BP (!) 139/95   Pulse 98   Temp 99.7 °F (37.6 °C) (Oral)   Resp 17   Ht 5' 11\" (1.803 m)   Wt 279 lb 5.2 oz (126.7 kg)   SpO2 94%   BMI 38.96 kg/m²     Temp (24hrs), Av.2 °F (37.3 °C), Min:98.7 °F (37.1 °C), Max:99.7 °F (37.6 °C)    In: 8296   Out: 4310 [Urine:1850]    Physical Exam:  Constitutional: This is a well developed, well nourished, 18.5-24.9 - Normal 56y.o. year old male who is alert, oriented, cooperative and in no apparent distress. Head:normocephalic and atraumatic. EENT:  PERRLA. No conjunctival injections. Septum was midline, mucosa was without erythema, exudates or cobblestoning. No thrush was noted.      Neck: Supple without thyromegaly. No elevated JVP. Trachea was midline.     Respiratory: Chest was symmetrical without dullness to percussion. Breath sounds bilaterally were clear to auscultation. There were no wheezes, rhonchi or rales. There is no intercostal retraction or use of accessory muscles. No egophony noted.      Cardiovascular: Regular without murmur, clicks, gallops or rubs.      Abdomen: Patient abdomen is soft, tender to palpation in the right quadrant, no rebound , no gaurding no rigidity. I could not appreciate the bowel sounds. Lymphatic: No lymphadenopathy. Musculoskeletal: Normal curvature of the spine. No gross muscle weakness. Extremities:  No lower extremity edema, ulcerations, tenderness, varicosities or erythema.   Muscle size, tone and strength are normal.  No involuntary movements are noted. Skin:  Warm and dry. Good color, turgor and pigmentation. No lesions or scars.   No cyanosis or clubbing  Neurological/Psychiatric: The patient's general behavior, level of consciousness, thought content and emotional status is normal.             Medications:  Scheduled Medications:    metoclopramide  10 mg Intravenous Q6H    iron sucrose  300 mg Intravenous Q24H    [Held by provider] insulin lispro  0-18 Units Subcutaneous Q6H    [Held by provider] insulin lispro  0-9 Units Subcutaneous Nightly    acetaminophen  1,000 mg Oral Q8H    methocarbamol  750 mg Oral Q6H    gabapentin  300 mg Oral Q8H    sodium chloride  20 mL Intravenous Once    metoprolol  5 mg Intravenous Q4H    fat emulsion  250 mL Intravenous Daily    famotidine (PEPCID) injection  20 mg Intravenous Daily    cefepime  1 g Intravenous Q12H    sodium chloride flush  10 mL Intravenous 2 times per day    sodium chloride flush  10 mL Intravenous 2 times per day    heparin (porcine)  5,000 Units Subcutaneous 3 times per day    metroNIDAZOLE  500 mg Intravenous Q8H    sodium chloride flush  10 mL Intravenous 2 times per day     Continuous Infusions:    insulin 2.64 Units/hr (09/09/20 0527)    PN-Adult 2-in-1 Central Line (Standard) 65 mL/hr at 09/08/20 1817    dextrose      sodium chloride 100 mL/hr at 09/08/20 2136     PRN Medicationsalbuterol, 2.5 mg, Q6H PRN  glucose, 15 g, PRN  dextrose, 12.5 g, PRN  glucagon (rDNA), 1 mg, PRN  dextrose, 100 mL/hr, PRN  HYDROmorphone, 0.5 mg, Q6H PRN  oxyCODONE, 5 mg, Q4H PRN  metoprolol, 5 mg, Q6H PRN  sodium chloride flush, 10 mL, PRN  sodium chloride flush, 10 mL, PRN  sodium chloride flush, 10 mL, PRN  ondansetron, 4 mg, Q6H PRN        Diagnostic Labs:  CBC:   Recent Labs     09/07/20  0650 09/08/20  0512 09/09/20  0510   WBC 19.8* 18.1* 17.1*   RBC 3.87* 3.54* 3.54*   HGB 10.4* 9.6* 9.6*   HCT 34.9* 32.0* 30.9*   MCV 90.2 90.4 87.3 RDW 14.2 13.8 14.1    322 386     BMP:   Recent Labs     09/08/20  0512 09/08/20 2013 09/09/20  0510    138 140   K 4.1 4.0 4.3    107 106   CO2 21 20 24   PHOS 2.4* 2.8 2.9   BUN 30* 29* 26*   CREATININE 1.41* 1.39* 1.44*     BNP: No results for input(s): BNP in the last 72 hours. PT/INR: No results for input(s): PROTIME, INR in the last 72 hours. APTT: No results for input(s): APTT in the last 72 hours. CARDIAC ENZYMES: No results for input(s): CKMB, CKMBINDEX, TROPONINI in the last 72 hours. Invalid input(s): CKTOTAL;3  FASTING LIPID PANEL:  Lab Results   Component Value Date    CHOL 131 06/22/2020    HDL 31 (L) 06/22/2020    TRIG 115 09/05/2020     LIVER PROFILE: No results for input(s): AST, ALT, ALB, BILIDIR, BILITOT, ALKPHOS in the last 72 hours. MICROBIOLOGY:   Lab Results   Component Value Date/Time    CULTURE NO GROWTH 6 DAYS 09/02/2020 04:49 AM       Imaging:    Ct Abdomen Pelvis Wo Contrast Additional Contrast? Oral And Rectal    Result Date: 9/7/2020  1. Interval left nephrectomy and small bowel and descending colon resection with primary anastomosis. No extravasation of administered oral contrast from the bowel to indicate bowel anastomotic leakage. 2. Small volume free intraperitoneal fluid, extensive left peritoneal reticulations and stranding and scattered pockets of free intraperitoneal air consistent with recent laparotomy. 3. Large filling defects in the sigmoid colon downstream from the colonic anastomosis probably combination of stool and blood products 4. Percutaneous gastrostomy tube in place. 5. Small left pleural effusion and subsegmental atelectasis. Trace right pleural effusion and subsegmental atelectasis.      Xr Chest Portable    Result Date: 9/3/2020  Line placement as above Expiratory exam with interval increase in now mild streaky bibasilar atelectasis     Xr Abdomen For Ng/og/ne Tube Placement    Result Date: 9/2/2020  Enteric tube terminates at the

## 2020-09-09 NOTE — PROGRESS NOTES
Paged Dr. Anna Tsai to ask for specific instructions for weaning Insulin drip. It was put in a nursing communication to wean with no instructions.

## 2020-09-09 NOTE — PLAN OF CARE
Pain level will be obtained to a 4/10 as stated by pt. As suitable level by end of hospital stay. . Both pharmacological and no-pharmacological way to control pain have been taught and used by pt.

## 2020-09-09 NOTE — PROGRESS NOTES
Jackelyn Molly BRISENO,  Seen for evaluation and education on Type 2 uncontrolled    Lab Results   Component Value Date    LABA1C 11.2 (H) 09/08/2020     Lab Results   Component Value Date     09/08/2020       Discussed with Jackelyn Barnes II,his current diabetes self-care routines prior to this admission. Patient has Hx of DM and was on Janumet at home but did not test BG, however did have meter at one time. He has not followed up with a PCP in awhile. He and wife work full time and have been doing a lot of fast food. He does have family Hx of DM with Dad who \"took insulin using pen but only took it once a week\". Diiscussed with patient that his dad was probably on a GLP-1 , not insulin. Patient states he had surgery last week and ended up having 1 kidney removed as they thought he had cancer. Following Survival Skills education topics were covered as appropriate to patient plan of for care:  _x__ Type 2 Diabetes diagnosis as chronic and progressive       _x__ Healthy eating - CHO food groups and need for CHO controlled diet. Elimination of sugary beverages, avoid skipping meal  Patient states he and wife have been doing a lot of fast food due to work schedules but both share in cooking, although he does most of grocery shopping. _x__ Role of physical activity - bouts of walking, swimming or low impact aerobics as recommended by care providers- aim for 30 minutes per day      _x__ Blood Glucose Self-Monitoring ( BGSM)  /how to use a BG meter - patient did have meter but not sure where it is as he has not tested in a long time. Patient will need new meter and testing supplies to test 4x/d at discharge.      _x__ Blood sugar targets Pre meal 80 - 130 and 2 hours post meal < 180    _x__ Hyperglycemia  ( BG over 250) signs and symptoms and treatment    __ Sick Day Care    ___ Hypoglycemia( BG < than 70) signs and symptoms and treatment \"Rule of 15\"  Did not address at this time as patient has not started any oral feedings or subq insulin. _x__ Keep BG log and take log and meter to follow up HCP appointments    _x__ Medications -  Insulin Lantus - Basal ( long acting) / Humalog-  Bolus (pre meal)  regime - insulin action times. _x__ Importance of dosing  pre meal rapid insulin from BG result at time of start of  meal & administering  within 15 minutes of eating meals   _x__ Importance of taking long acting insulin at same time each day and not to skip doses   ___ Demonstration of self-administering insulin via vial and syringe   _x__ Demonstration of self-administering insulin via Pen and pen needle tips   _x__ Reinforce safe needle / sharps disposal    _x__ Insulin injection site rotation patient was aware of site to use for injections. __x_ Medications - Orals patient was taking Janumet at home    Following Support materials were provided for patient to take home:  _x__ [employer] as available \"Diabetes and you\"  _x__ Be safe with Needles teaching sheet /  [employer]  _x__\"Type 2 Diabetes and Adding Insulin\" fold out with survival skills   _x__ [facility] Diabetes Education brochure/ contact card      Patient needs reinforcement. understanding  of survival skills education. Patient was tired and would fall asleep easily. Patient was receptive but will need reinforcement and practice.     RECOMMENDATIONS INPATIENT PLAN:  _x__ Dietician Consult this admission for education on CHO diet and diet plan for home    RN Bedside Support Plan:  _x__ Bedside RN to support patient with administration of insulin at bedside  _x__ Bedside RN to support patient with SMBG - - Reinforce target BG ranges, Hyper and Hypo signs and symptoms and treatment and how to use a home BG meter  _x_ Bedside RN to coordinate BG Check with mealtime and insulin  _x_ Bedside RN to ensure snack at HS for patient on HS correction scale insulin  _x_ Bedside RN to reinforce survival skills education and diabetes self care    _x__ set up patient to watch Education TV Channels [facility]'s Channels 75 and 76 at 9am, 3pm,  7pm, 9pm)   patient was aware of education channels     RECOMMENDATIONS FOR OUTPATIENT PLAN:  Diabetes Self-Monitoring Supplies:  _x__ Preferred / formulary blood glucose meter for BGSM at home use    x___ Strips and lancets for 4  frequency of home BGSM    Diabetes Medications:  _x__ Insulin Pen  Basal / long acting - dose per MD   _x__ Insulin Pen   Bolus pre meal set dose  or correction scale - dose per MD  _x__ Insulin Delivery method - Pens -   order Insulin Pen Needle 31G X 4 mm MISC  ___ Insulin Delivery method - Syringe - order  Insulin syringe Needle U 100 31G X 15/64\" 0.5ml    Diabetes Education / HCP follow -up :   _x_ Would recommend follow -up education at outpatient diabetes education at [place] [address]. An ordered is needed for this service and can be placed via EHR. Discharge Navigator --- Med Reconciliation -- New orders for discharge tab -- search diabetic ed - REF20 -  [facility]  - review and sign     _x__ Follow -up with HCP / PCP within one week.     Justen Giron RN

## 2020-09-09 NOTE — PROGRESS NOTES
Physical Therapy  Facility/Department: [address] STE[address]  Daily Treatment Note  NAME: Ciara Louie II  : 1960  MRN: [id number]    Date of Service: 2020    Discharge Recommendations:  Patient would benefit from continued therapy after discharge   PT Equipment Recommendations  Equipment Needed: Rolling walker    Assessment   Body structures, Functions, Activity limitations: Decreased functional mobility ; Decreased posture;Decreased endurance;Decreased strength;Decreased balance; Increased pain  Assessment: Pt ambulated 120 ft wit RW requiring CGA. Pt demos increased fatigue and lack of endurance during ambulation. would benefit from continued therapy after d/c  Prognosis: Good  REQUIRES PT FOLLOW UP: Yes  Activity Tolerance  Activity Tolerance: Patient limited by fatigue;Patient Tolerated treatment well     Patient Diagnosis(es): There were no encounter diagnoses. has a past medical history of Back pain, Cellulitis, Diabetes mellitus ([address] Gout, History of kidney cancer, and Hypertension. has a past surgical history that includes Ankle surgery; knee surgery (Bilateral); Carpal tunnel release (Bilateral); Cystoscopy (Right, 2020); total nephrectomy (Left, 2020); Abdominal exploration surgery (2020); hc cath power picc triple (9/3/2020); Kidney surgery (Left, 2020); and Small intestine surgery (N/A, 2020). Restrictions  Restrictions/Precautions  Restrictions/Precautions: General Precautions, Surgical Protocols, Fall Risk, Up as Tolerated  Required Braces or Orthoses?: No  Position Activity Restriction  Other position/activity restrictions: up with assistance; G tube to gravity  Subjective   General  Response To Previous Treatment: Patient with no complaints from previous session. Family / Caregiver Present: No  Subjective  Subjective: Pt resting in bed upon arrival, agreeable to PT  General Comment  Comments: Pt retired in recliner after session with call light in reach.  RN present at this time. Pain Screening  Patient Currently in Pain: Denies  Vital Signs  Patient Currently in Pain: Denies       Orientation  Orientation  Overall Orientation Status: Within Normal Limits  Cognition      Objective   Bed mobility  Supine to Sit: Contact guard assistance  Comment: HOB elevated, use of rails  Transfers  Sit to Stand: Stand by assistance  Stand to sit: Stand by assistance  Ambulation  Ambulation?: Yes  Ambulation 1  Surface: level tile  Device: Rolling Walker  Assistance: Contact guard assistance  Quality of Gait: flexed posture, slow zafar, steady, no LOB  Distance: 120 ft  Comments: c/o of fatigue  Stairs/Curb  Stairs?: No     Balance  Posture: Good  Sitting - Static: Good  Sitting - Dynamic: Good;-  Standing - Static: Fair;+  Standing - Dynamic: Fair  Comments: standing balance assessed with RW  Exercises  Seated LE exercise program: Long Arc Quads, hip abduction/adduction, heel/toe raises, and marches.  Reps: x10  Goals  Short term goals  Time Frame for Short term goals: 15  Short term goal 1: Pt to perform bed mobility CGA  Short term goal 2: Pt to demonstrate functional transfers Terrance  Short term goal 3: Ambulate 100ft w/ RW CGA  Short term goal 4: Tolerate 30 minutes of therapy to demo increased endurance  Patient Goals   Patient goals : Did not state    Plan    Plan  Times per week: 5-6x/week  Current Treatment Recommendations: Strengthening, Transfer Training, Endurance Training, Patient/Caregiver Education & Training, ROM, Balance Training, Gait Training, Home Exercise Program, Functional Mobility Training, Stair training, Safety Education & Training  Safety Devices  Type of devices: Call light within reach, Gait belt, Nurse notified, Patient at risk for falls, Left in chair  Restraints  Initially in place: No     Therapy Time   Individual Concurrent Group Co-treatment   Time In 0859         Time Out 0928         Minutes 29         Timed Code Treatment Minutes: 29 Minutes

## 2020-09-10 PROBLEM — R73.9 HYPERGLYCEMIA: Status: ACTIVE | Noted: 2020-09-10

## 2020-09-10 PROBLEM — C64.9 RENAL CELL CANCER (HCC): Status: ACTIVE | Noted: 2020-09-10

## 2020-09-10 LAB
ABSOLUTE EOS #: 0.53 K/UL (ref 0–0.44)
ABSOLUTE IMMATURE GRANULOCYTE: 0.89 K/UL (ref 0–0.3)
ABSOLUTE LYMPH #: 2.31 K/UL (ref 1.1–3.7)
ABSOLUTE MONO #: 1.6 K/UL (ref 0.1–1.2)
ANION GAP SERPL CALCULATED.3IONS-SCNC: 10 MMOL/L (ref 9–17)
BASOPHILS # BLD: 0 % (ref 0–2)
BASOPHILS ABSOLUTE: 0 K/UL (ref 0–0.2)
BUN BLDV-MCNC: 24 MG/DL (ref 8–23)
BUN/CREAT BLD: ABNORMAL (ref 9–20)
CALCIUM SERPL-MCNC: 8.4 MG/DL (ref 8.6–10.4)
CHLORIDE BLD-SCNC: 104 MMOL/L (ref 98–107)
CO2: 22 MMOL/L (ref 20–31)
CREAT SERPL-MCNC: 1.41 MG/DL (ref 0.7–1.2)
DIFFERENTIAL TYPE: ABNORMAL
EOSINOPHILS RELATIVE PERCENT: 3 % (ref 1–4)
GFR AFRICAN AMERICAN: >60 ML/MIN
GFR NON-AFRICAN AMERICAN: 51 ML/MIN
GFR SERPL CREATININE-BSD FRML MDRD: ABNORMAL ML/MIN/{1.73_M2}
GFR SERPL CREATININE-BSD FRML MDRD: ABNORMAL ML/MIN/{1.73_M2}
GLUCOSE BLD-MCNC: 136 MG/DL (ref 75–110)
GLUCOSE BLD-MCNC: 170 MG/DL (ref 75–110)
GLUCOSE BLD-MCNC: 170 MG/DL (ref 75–110)
GLUCOSE BLD-MCNC: 194 MG/DL (ref 70–99)
GLUCOSE BLD-MCNC: 225 MG/DL (ref 75–110)
GLUCOSE BLD-MCNC: 252 MG/DL (ref 75–110)
HCT VFR BLD CALC: 34.7 % (ref 40.7–50.3)
HEMOGLOBIN: 10.4 G/DL (ref 13–17)
IMMATURE GRANULOCYTES: 5 %
LYMPHOCYTES # BLD: 13 % (ref 24–43)
MAGNESIUM: 2 MG/DL (ref 1.6–2.6)
MCH RBC QN AUTO: 27.3 PG (ref 25.2–33.5)
MCHC RBC AUTO-ENTMCNC: 30 G/DL (ref 28.4–34.8)
MCV RBC AUTO: 91.1 FL (ref 82.6–102.9)
MONOCYTES # BLD: 9 % (ref 3–12)
MORPHOLOGY: NORMAL
NRBC AUTOMATED: 0 PER 100 WBC
PDW BLD-RTO: 14.3 % (ref 11.8–14.4)
PHOSPHORUS: 3.2 MG/DL (ref 2.5–4.5)
PLATELET # BLD: 443 K/UL (ref 138–453)
PLATELET ESTIMATE: ABNORMAL
PMV BLD AUTO: 11.1 FL (ref 8.1–13.5)
POTASSIUM SERPL-SCNC: 4.8 MMOL/L (ref 3.7–5.3)
RBC # BLD: 3.81 M/UL (ref 4.21–5.77)
RBC # BLD: ABNORMAL 10*6/UL
SEG NEUTROPHILS: 70 % (ref 36–65)
SEGMENTED NEUTROPHILS ABSOLUTE COUNT: 12.47 K/UL (ref 1.5–8.1)
SODIUM BLD-SCNC: 136 MMOL/L (ref 135–144)
WBC # BLD: 17.8 K/UL (ref 3.5–11.3)
WBC # BLD: ABNORMAL 10*3/UL

## 2020-09-10 PROCEDURE — 6360000002 HC RX W HCPCS: Performed by: STUDENT IN AN ORGANIZED HEALTH CARE EDUCATION/TRAINING PROGRAM

## 2020-09-10 PROCEDURE — 2580000003 HC RX 258: Performed by: STUDENT IN AN ORGANIZED HEALTH CARE EDUCATION/TRAINING PROGRAM

## 2020-09-10 PROCEDURE — 2500000003 HC RX 250 WO HCPCS: Performed by: STUDENT IN AN ORGANIZED HEALTH CARE EDUCATION/TRAINING PROGRAM

## 2020-09-10 PROCEDURE — 97535 SELF CARE MNGMENT TRAINING: CPT

## 2020-09-10 PROCEDURE — 6370000000 HC RX 637 (ALT 250 FOR IP): Performed by: STUDENT IN AN ORGANIZED HEALTH CARE EDUCATION/TRAINING PROGRAM

## 2020-09-10 PROCEDURE — 97110 THERAPEUTIC EXERCISES: CPT

## 2020-09-10 PROCEDURE — 82947 ASSAY GLUCOSE BLOOD QUANT: CPT

## 2020-09-10 PROCEDURE — 36415 COLL VENOUS BLD VENIPUNCTURE: CPT

## 2020-09-10 PROCEDURE — 2060000000 HC ICU INTERMEDIATE R&B

## 2020-09-10 PROCEDURE — 94761 N-INVAS EAR/PLS OXIMETRY MLT: CPT

## 2020-09-10 PROCEDURE — 97116 GAIT TRAINING THERAPY: CPT

## 2020-09-10 PROCEDURE — 80048 BASIC METABOLIC PNL TOTAL CA: CPT

## 2020-09-10 PROCEDURE — 83735 ASSAY OF MAGNESIUM: CPT

## 2020-09-10 PROCEDURE — 84100 ASSAY OF PHOSPHORUS: CPT

## 2020-09-10 PROCEDURE — 85025 COMPLETE CBC W/AUTO DIFF WBC: CPT

## 2020-09-10 PROCEDURE — G0108 DIAB MANAGE TRN  PER INDIV: HCPCS

## 2020-09-10 PROCEDURE — 99233 SBSQ HOSP IP/OBS HIGH 50: CPT | Performed by: INTERNAL MEDICINE

## 2020-09-10 RX ORDER — INSULIN GLARGINE 100 [IU]/ML
10 INJECTION, SOLUTION SUBCUTANEOUS ONCE
Status: DISCONTINUED | OUTPATIENT
Start: 2020-09-10 | End: 2020-09-10

## 2020-09-10 RX ORDER — METOPROLOL TARTRATE 50 MG/1
50 TABLET, FILM COATED ORAL 2 TIMES DAILY
Status: DISCONTINUED | OUTPATIENT
Start: 2020-09-10 | End: 2020-09-11 | Stop reason: HOSPADM

## 2020-09-10 RX ORDER — INSULIN GLARGINE 100 [IU]/ML
60 INJECTION, SOLUTION SUBCUTANEOUS NIGHTLY
Status: DISCONTINUED | OUTPATIENT
Start: 2020-09-10 | End: 2020-09-11 | Stop reason: HOSPADM

## 2020-09-10 RX ORDER — INSULIN GLARGINE 100 [IU]/ML
55 INJECTION, SOLUTION SUBCUTANEOUS NIGHTLY
Status: DISCONTINUED | OUTPATIENT
Start: 2020-09-10 | End: 2020-09-10

## 2020-09-10 RX ORDER — LISINOPRIL 20 MG/1
20 TABLET ORAL DAILY
Status: DISCONTINUED | OUTPATIENT
Start: 2020-09-10 | End: 2020-09-10

## 2020-09-10 RX ADMIN — HYDROMORPHONE HYDROCHLORIDE 0.5 MG: 1 INJECTION, SOLUTION INTRAMUSCULAR; INTRAVENOUS; SUBCUTANEOUS at 18:00

## 2020-09-10 RX ADMIN — METOPROLOL TARTRATE 5 MG: 1 INJECTION, SOLUTION INTRAVENOUS at 03:05

## 2020-09-10 RX ADMIN — HYDROMORPHONE HYDROCHLORIDE 0.5 MG: 1 INJECTION, SOLUTION INTRAMUSCULAR; INTRAVENOUS; SUBCUTANEOUS at 03:05

## 2020-09-10 RX ADMIN — HYDROMORPHONE HYDROCHLORIDE 0.5 MG: 1 INJECTION, SOLUTION INTRAMUSCULAR; INTRAVENOUS; SUBCUTANEOUS at 23:38

## 2020-09-10 RX ADMIN — HYDROMORPHONE HYDROCHLORIDE 0.5 MG: 1 INJECTION, SOLUTION INTRAMUSCULAR; INTRAVENOUS; SUBCUTANEOUS at 12:07

## 2020-09-10 RX ADMIN — CEFEPIME HYDROCHLORIDE 1 G: 1 INJECTION, POWDER, FOR SOLUTION INTRAMUSCULAR; INTRAVENOUS at 16:47

## 2020-09-10 RX ADMIN — METHOCARBAMOL TABLETS 750 MG: 750 TABLET, COATED ORAL at 23:11

## 2020-09-10 RX ADMIN — FAMOTIDINE 20 MG: 10 INJECTION INTRAVENOUS at 08:20

## 2020-09-10 RX ADMIN — METHOCARBAMOL TABLETS 750 MG: 750 TABLET, COATED ORAL at 05:47

## 2020-09-10 RX ADMIN — METOCLOPRAMIDE 10 MG: 5 INJECTION, SOLUTION INTRAMUSCULAR; INTRAVENOUS at 20:39

## 2020-09-10 RX ADMIN — METOPROLOL TARTRATE 5 MG: 1 INJECTION, SOLUTION INTRAVENOUS at 20:49

## 2020-09-10 RX ADMIN — GABAPENTIN 300 MG: 300 CAPSULE ORAL at 23:12

## 2020-09-10 RX ADMIN — INSULIN LISPRO 3 UNITS: 100 INJECTION, SOLUTION INTRAVENOUS; SUBCUTANEOUS at 08:28

## 2020-09-10 RX ADMIN — GABAPENTIN 300 MG: 300 CAPSULE ORAL at 05:47

## 2020-09-10 RX ADMIN — SODIUM CHLORIDE 300 MG: 9 INJECTION, SOLUTION INTRAVENOUS at 08:21

## 2020-09-10 RX ADMIN — HEPARIN SODIUM 5000 UNITS: 5000 INJECTION INTRAVENOUS; SUBCUTANEOUS at 13:49

## 2020-09-10 RX ADMIN — ACETAMINOPHEN 1000 MG: 500 TABLET ORAL at 13:54

## 2020-09-10 RX ADMIN — OXYCODONE HYDROCHLORIDE 5 MG: 5 TABLET ORAL at 09:13

## 2020-09-10 RX ADMIN — Medication 10 ML: at 08:22

## 2020-09-10 RX ADMIN — METOPROLOL TARTRATE 5 MG: 1 INJECTION, SOLUTION INTRAVENOUS at 11:16

## 2020-09-10 RX ADMIN — ACETAMINOPHEN 1000 MG: 500 TABLET ORAL at 05:47

## 2020-09-10 RX ADMIN — METOPROLOL TARTRATE 5 MG: 1 INJECTION, SOLUTION INTRAVENOUS at 06:30

## 2020-09-10 RX ADMIN — INSULIN GLARGINE 60 UNITS: 100 INJECTION, SOLUTION SUBCUTANEOUS at 20:38

## 2020-09-10 RX ADMIN — INSULIN LISPRO 9 UNITS: 100 INJECTION, SOLUTION INTRAVENOUS; SUBCUTANEOUS at 13:53

## 2020-09-10 RX ADMIN — SODIUM CHLORIDE, PRESERVATIVE FREE 10 ML: 5 INJECTION INTRAVENOUS at 20:48

## 2020-09-10 RX ADMIN — HEPARIN SODIUM 5000 UNITS: 5000 INJECTION INTRAVENOUS; SUBCUTANEOUS at 23:12

## 2020-09-10 RX ADMIN — INSULIN LISPRO 6 UNITS: 100 INJECTION, SOLUTION INTRAVENOUS; SUBCUTANEOUS at 03:06

## 2020-09-10 RX ADMIN — METHOCARBAMOL TABLETS 750 MG: 750 TABLET, COATED ORAL at 17:56

## 2020-09-10 RX ADMIN — HEPARIN SODIUM 5000 UNITS: 5000 INJECTION INTRAVENOUS; SUBCUTANEOUS at 05:47

## 2020-09-10 RX ADMIN — METRONIDAZOLE 500 MG: 500 INJECTION, SOLUTION INTRAVENOUS at 12:38

## 2020-09-10 RX ADMIN — OXYCODONE HYDROCHLORIDE 5 MG: 5 TABLET ORAL at 16:46

## 2020-09-10 RX ADMIN — OXYCODONE HYDROCHLORIDE 5 MG: 5 TABLET ORAL at 00:34

## 2020-09-10 RX ADMIN — METOCLOPRAMIDE 10 MG: 5 INJECTION, SOLUTION INTRAMUSCULAR; INTRAVENOUS at 00:34

## 2020-09-10 RX ADMIN — METOPROLOL TARTRATE 50 MG: 50 TABLET, FILM COATED ORAL at 23:11

## 2020-09-10 RX ADMIN — METOCLOPRAMIDE 10 MG: 5 INJECTION, SOLUTION INTRAMUSCULAR; INTRAVENOUS at 12:37

## 2020-09-10 RX ADMIN — METOCLOPRAMIDE 10 MG: 5 INJECTION, SOLUTION INTRAMUSCULAR; INTRAVENOUS at 05:46

## 2020-09-10 RX ADMIN — METRONIDAZOLE 500 MG: 500 INJECTION, SOLUTION INTRAVENOUS at 17:55

## 2020-09-10 RX ADMIN — METHOCARBAMOL TABLETS 750 MG: 750 TABLET, COATED ORAL at 12:07

## 2020-09-10 RX ADMIN — ACETAMINOPHEN 1000 MG: 500 TABLET ORAL at 23:11

## 2020-09-10 RX ADMIN — METRONIDAZOLE 500 MG: 500 INJECTION, SOLUTION INTRAVENOUS at 03:43

## 2020-09-10 RX ADMIN — SODIUM CHLORIDE, PRESERVATIVE FREE 10 ML: 5 INJECTION INTRAVENOUS at 20:49

## 2020-09-10 RX ADMIN — GABAPENTIN 300 MG: 300 CAPSULE ORAL at 13:49

## 2020-09-10 RX ADMIN — Medication 10 ML: at 20:39

## 2020-09-10 RX ADMIN — CEFEPIME HYDROCHLORIDE 1 G: 1 INJECTION, POWDER, FOR SOLUTION INTRAMUSCULAR; INTRAVENOUS at 03:05

## 2020-09-10 ASSESSMENT — PAIN DESCRIPTION - FREQUENCY
FREQUENCY: CONTINUOUS

## 2020-09-10 ASSESSMENT — PAIN DESCRIPTION - ORIENTATION
ORIENTATION: MID
ORIENTATION: MID
ORIENTATION: LOWER;MID
ORIENTATION: MID

## 2020-09-10 ASSESSMENT — PAIN SCALES - GENERAL
PAINLEVEL_OUTOF10: 4
PAINLEVEL_OUTOF10: 7
PAINLEVEL_OUTOF10: 7
PAINLEVEL_OUTOF10: 6
PAINLEVEL_OUTOF10: 5
PAINLEVEL_OUTOF10: 9
PAINLEVEL_OUTOF10: 7
PAINLEVEL_OUTOF10: 6
PAINLEVEL_OUTOF10: 7
PAINLEVEL_OUTOF10: 5
PAINLEVEL_OUTOF10: 8

## 2020-09-10 ASSESSMENT — PAIN DESCRIPTION - LOCATION
LOCATION: ABDOMEN

## 2020-09-10 ASSESSMENT — PAIN DESCRIPTION - PROGRESSION
CLINICAL_PROGRESSION: NOT CHANGED
CLINICAL_PROGRESSION: NOT CHANGED

## 2020-09-10 ASSESSMENT — PAIN DESCRIPTION - ONSET
ONSET: ON-GOING

## 2020-09-10 ASSESSMENT — PAIN DESCRIPTION - DESCRIPTORS
DESCRIPTORS: ACHING
DESCRIPTORS: CRAMPING
DESCRIPTORS: ACHING

## 2020-09-10 ASSESSMENT — PAIN DESCRIPTION - PAIN TYPE
TYPE: ACUTE PAIN

## 2020-09-10 ASSESSMENT — PAIN - FUNCTIONAL ASSESSMENT
PAIN_FUNCTIONAL_ASSESSMENT: ACTIVITIES ARE NOT PREVENTED
PAIN_FUNCTIONAL_ASSESSMENT: ACTIVITIES ARE NOT PREVENTED

## 2020-09-10 NOTE — DISCHARGE INSTR - COC
Date(s) Administered    Pneumococcal Polysaccharide ([id number]) 12/12/2016       Active Problems:  Patient Active Problem List   Diagnosis Code    Cellulitis L03.90    Type 2 diabetes mellitus without complication (HCC) M47.9    Essential hypertension I10    Morbid obesity due to excess calories ([facility]) E66.01    Chronic kidney disease (CKD) N18.9    DEBBI (acute kidney injury) ([facility] N17.9    Bowel obstruction ([facility] K56.609    Small bowel obstruction ([facility] K56.609    Severe malnutrition ([facility] E43    Hyperglycemia R73.9    Renal cell cancer ([facility] C64.9       Isolation/Infection:   Isolation            No Isolation          Patient Infection Status       Infection Onset Added Last Indicated Last Indicated By Review Planned Expiration Resolved Resolved By    None active    Resolved    COVID-19 Rule Out 09/01/20 09/01/20 09/01/20 COVID-19 (Ordered)   09/01/20 Rule-Out Test Resulted    COVID-19 Rule Out 06/10/20 06/10/20 06/10/20 COVID-19 Ambulatory (Ordered)   06/12/20 Rule-Out Test Resulted            Nurse Assessment:  Last Vital Signs: BP (!) 151/94   Pulse 115   Temp 98.5 °F (36.9 °C) (Oral)   Resp 18   Ht 5' 11\" (1.803 m)   Wt 275 lb 9.2 oz (125 kg)   SpO2 97%   BMI 38.43 kg/m²     Last documented pain score (0-10 scale): Pain Level: 5  Last Weight:   Wt Readings from Last 1 Encounters:   09/10/20 275 lb 9.2 oz (125 kg)     Mental Status:  oriented, alert, coherent, logical, thought processes intact and able to concentrate and follow conversation    IV Access:  - None    Nursing Mobility/ADLs:  Walking   Assisted  Transfer  Assisted  Bathing  Assisted  Dressing  Assisted  Toileting  Independent  Feeding  Independent  Med Admin  Independent  Med Delivery   whole    Wound Care Documentation and Therapy:  Pressure Ulcer 10/04/17 Nose Mid scabbed (Active)   Number of days: 1072        Elimination:  Continence:   · Bowel:  Yes  · Bladder: Yes  Urinary Catheter: None   Colostomy/Ileostomy/Ileal Conduit: No       Date of Last BM: 9/11/20    Intake/Output Summary (Last 24 hours) at 9/10/2020 1629  Last data filed at 9/10/2020 1227  Gross per 24 hour   Intake 2045 ml   Output 1750 ml   Net 295 ml     I/O last 3 completed shifts: In: 2045 [P.O.:850; IV Piggyback:150]  Out: 6849 [Urine:1750]    Safety Concerns: At Risk for Falls    Impairments/Disabilities:      None    Nutrition Therapy:  Current Nutrition Therapy:   - Oral Diet:  Carb Control 4 carbs/meal (1800kcals/day) and Low Fiber    Routes of Feeding: Oral  Liquids: Thin Liquids  Daily Fluid Restriction: no  Last Modified Barium Swallow with Video (Video Swallowing Test): not done    Treatments at the Time of Hospital Discharge:   Respiratory Treatments: n/a  Oxygen Therapy:  is not on home oxygen therapy. Ventilator:    - No ventilator support    Rehab Therapies: nursing  Weight Bearing Status/Restrictions: No weight bearing restirctions  Other Medical Equipment (for information only, NOT a DME order):  walker  Other Treatments: none    Patient's personal belongings (please select all that are sent with patient):  None    RN SIGNATURE:  Electronically signed by Alexandra Simmons RN on 9/11/20 at 4:59 PM EDT    CASE MANAGEMENT/SOCIAL WORK SECTION    Inpatient Status Date: 9/1/2020    Readmission Risk Assessment Score:  Readmission Risk              Risk of Unplanned Readmission:        34           Discharging to Facility/ Agency   · Name:    [facility]  · Address:  · Phone:   [phone]  · Fax:    [phone]    Dialysis Facility (if applicable)   · Name:  · Address:  · Dialysis Schedule:  · Phone:  · Fax:    / signature: Electronically signed by Olya Marquis RN on 9/11/20 at 12:08 PM EDT    PHYSICIAN SECTION    Prognosis: Fair    Condition at Discharge: Stable    Rehab Potential (if transferring to Rehab):  Fair    Recommended Labs or Other Treatments After Discharge: Please start taking lantus 60 units nightly along with sliding scale insulin, follow up with PCP for adjustment of the same as per your blood sugar levels. Stop taking oral hypoglycemic agents. Please take levaquin and flagyl for 4 more days, then stop. Get a repeat BMP and CBC in 2-3 days. Please take metoprolol and hydralazine for BP, follow up with PCP for hypertension and resume taking lisinopril as tolerated by BP and review of BMP. Please follow up with General Surgery and Urology for post op follow up and removal of G tube. Return to ER in case of worsening of symptoms or new symptoms. Start taking oral iron supplementation along with Vitamin C, follow up with PCP for repeat CBC and follow up with GI for OP EGD and Colonoscopy. Physician Certification: I certify the above information and transfer of Claudell Parr  is necessary for the continuing treatment of the diagnosis listed and that he requires [address] for greater 30 days.      Update Admission H&P: No change in H&P    PHYSICIAN SIGNATURE:  Electronically signed by Lita Sprague MD on 9/11/20 at 9:59 AM EDT

## 2020-09-10 NOTE — PROGRESS NOTES
Physical Therapy  Facility/Department: [facility] 4A STEPDOWN  Daily Treatment Note  NAME: Jan Casiano II  : 1960  MRN: [id number]    Date of Service: 9/10/2020    Discharge Recommendations:  Patient would benefit from continued therapy after discharge   PT Equipment Recommendations  Equipment Needed: Yes  Mobility Devices: Clementina Simmer: Rolling    Assessment   Body structures, Functions, Activity limitations: Decreased functional mobility ; Decreased posture;Decreased endurance;Decreased strength;Decreased balance; Increased pain  Assessment: Pt ambulated 140 ft with RW requiring CGA. Pt demos increased fatigue and lack of endurance during ambulation. would benfit from continued therapy after d/c  Prognosis: Good  PT Education: Goals;PT Role;Plan of Care;Transfer Training;Functional Mobility Training;General Safety;Gait Training  REQUIRES PT FOLLOW UP: Yes  Activity Tolerance  Activity Tolerance: Patient Tolerated treatment well;Patient limited by endurance     Patient Diagnosis(es): There were no encounter diagnoses. has a past medical history of Back pain, Cellulitis, Diabetes mellitus ([address] Gout, History of kidney cancer, and Hypertension. has a past surgical history that includes Ankle surgery; knee surgery (Bilateral); Carpal tunnel release (Bilateral); Cystoscopy (Right, 2020); total nephrectomy (Left, 2020); Abdominal exploration surgery (2020); hc cath power picc triple (9/3/2020); Kidney surgery (Left, 2020); and Small intestine surgery (N/A, 2020). Restrictions  Restrictions/Precautions  Restrictions/Precautions: General Precautions, Surgical Protocols, Fall Risk, Up as Tolerated  Required Braces or Orthoses?: No  Position Activity Restriction  Other position/activity restrictions: up with assistance; G tube to gravity  Subjective   General  Response To Previous Treatment: Patient with no complaints from previous session.   Family / Caregiver Present: No  Subjective  Subjective: Pt and RN agreeable  to PT. Pt seated in recliner upon arrival, very pleasant and cooperative with treatment. C/o abdominal pain from incision  General Comment  Comments: Pt retired in recliner after session with call light in reach.   Pain Screening  Patient Currently in Pain: Yes  Pain Assessment  Pain Assessment: 0-10  Pain Level: 9  Pain Type: Acute pain  Pain Location: Abdomen  Pain Orientation: Lower;Mid  Pain Descriptors: Aching  Pain Frequency: Continuous  Pain Onset: On-going  Clinical Progression: Not changed  Functional Pain Assessment: Activities are not prevented  Non-Pharmaceutical Pain Intervention(s): Ambulation/Increased Activity;Repositioned  Vital Signs  Patient Currently in Pain: Yes       Orientation  Orientation  Overall Orientation Status: Within Normal Limits  Cognition      Objective   Bed mobility  Scooting: Stand by assistance  Comment: pt seated in recliner upon arrival and returned to recliner after amb  Transfers  Sit to Stand: Stand by assistance  Stand to sit: Stand by assistance  Comment: vc's for UE placement with good return demo  Ambulation  Ambulation?: Yes  Ambulation 1  Surface: level tile  Device: Rolling Walker  Assistance: Contact guard assistance  Quality of Gait: flexed posture, slow zafar, steady, no LOB, high reliance on RW  Gait Deviations: Slow Zafar;Decreased step length  Distance: 140ft  Comments: slight SOB noted after amb, SpO2 96% on RA  Stairs/Curb  Stairs?: No     Balance  Posture: Good  Sitting - Static: Good  Sitting - Dynamic: Good  Standing - Static: Fair;+  Standing - Dynamic: Fair  Comments: standing balance assessed with RW  Exercises  Quad Sets: 10x  Gluteal Sets: 10x  Knee Long Arc Quad: 10x  Ankle Pumps: 20x     Goals  Short term goals  Time Frame for Short term goals: 14  Short term goal 1: Pt to perform bed mobility CGA  Short term goal 2: Pt to demonstrate functional transfers Terrance  Short term goal 3: Ambulate 100ft w/ RW CGA  Short term goal 4: Tolerate 30 minutes of therapy to demo increased endurance  Patient Goals   Patient goals : Did not state    Plan    Plan  Times per week: 5-6x/week  Current Treatment Recommendations: Strengthening, Transfer Training, Endurance Training, Patient/Caregiver Education & Training, ROM, Balance Training, Gait Training, Home Exercise Program, Functional Mobility Training, Stair training, Safety Education & Training  Safety Devices  Type of devices: Call light within reach, Gait belt, Nurse notified, Patient at risk for falls, Left in chair, All fall risk precautions in place  Restraints  Initially in place: No     Therapy Time   Individual Concurrent Group Co-treatment   Time In 1120         Time Out 1146         Minutes 26         Timed Code Treatment Minutes: [place] [address], [place]

## 2020-09-10 NOTE — PROGRESS NOTES
Occupational Therapy  Facility/Department: [facility] 4A STEPDOWN  Daily Treatment Note  NAME: Olga Lidia Bautista II  : 1960  MRN: [id number]    Date of Service: 9/10/2020    Discharge Recommendations:  Patient would benefit from continued therapy after discharge     Assessment   Performance deficits / Impairments: Decreased functional mobility ; Decreased safe awareness;Decreased ADL status; Decreased endurance;Decreased high-level IADLs;Decreased strength  Comments: Pt currently requires SBA-MOD A to complete functional mobility/transfers and ADL tasks. Pt is currently limited by deficits noted as well as moderate pain and decreased endurance. Pt and family given lots of education this date about adaptive strategies to complete ADL tasks at home, energy conservation, and breathing techniques with good carry-over. Pt would benefit from continued inpatient acute occupational therapy during hospitalization as well as continued therapy services to increase safety and endurance in daily activities. Prognosis: Good  Decision Making: Medium Complexity  OT Education: OT Role;Equipment; Family Education; ADL Adaptive Strategies;Transfer Training;Energy Conservation;IADL Safety  Patient Education: Pt and family educated on use of surgical soap, energy conservation, breathing techniques, adaptive strategies, and IADL safety while at home. Pt and family with lots of questions and verbalized understanding and appreciative of information  REQUIRES OT FOLLOW UP: Yes  Activity Tolerance  Activity Tolerance: Patient limited by pain; Patient limited by fatigue  Safety Devices  Safety Devices in place: Yes  Type of devices: Call light within reach; Patient at risk for falls; Left in chair;Left in bed  Restraints  Initially in place: No       Patient Diagnosis(es): There were no encounter diagnoses. has a past medical history of Back pain, Cellulitis, Diabetes mellitus ([address] Gout, History of kidney cancer, and Hypertension.    has a past surgical history that includes Ankle surgery; knee surgery (Bilateral); Carpal tunnel release (Bilateral); Cystoscopy (Right, 9/1/2020); total nephrectomy (Left, 09/02/2020); Abdominal exploration surgery (09/02/2020); hc cath power picc triple (9/3/2020); Kidney surgery (Left, 9/2/2020); and Small intestine surgery (N/A, 9/2/2020). Restrictions  Restrictions/Precautions  Restrictions/Precautions: General Precautions, Surgical Protocols, Fall Risk  Required Braces or Orthoses?: No  Position Activity Restriction  Other position/activity restrictions: up with assistance     Subjective   General  Chart Reviewed: Orders, Progress Notes, History and Physical, Imaging, Labs, Operative Notes  Patient assessed for rehabilitation services?: Yes  Response to previous treatment: Patient with no complaints from previous session  Family / Caregiver Present: Yes(Wife)  General Comment  Comments: Rn ok'd for therapy visit this date. Pt agreeable to session, pleasent/cooperative throughout. Pain Assessment  Pain Assessment: 0-10  Pain Level: 5  Patient's Stated Pain Goal: No pain  Pain Type: Acute pain  Pain Location: Abdomen  Pain Descriptors: Aching  Pain Frequency: Continuous  Pain Onset: On-going  Clinical Progression: Not changed  Functional Pain Assessment: Activities are not prevented  Non-Pharmaceutical Pain Intervention(s): Repositioned;Distraction; Ambulation/Increased Activity  Response to Pain Intervention: Patient Satisfied  Vital Signs  Patient Currently in Pain: Yes     Orientation  Orientation  Overall Orientation Status: Within Functional Limits     Objective    ADL  UE Bathing: Stand by assistance(completed hand hyegine standing at sink)  LE Bathing: Contact guard assistance; Increased time to complete(to complete bottom hygeine)  LE Dressing: Moderate assistance; Increased time to complete(Attempted to don hospital socks seated EOB.  Pt was able to thread first sock however unable to complete d/t pain/fatigue. Pt also able to don undergarments standing from EOB CGA)  Toileting: Contact guard assistance; Increased time to complete(standard toilet with B hand rails)     Balance  Sitting Balance: Supervision(Seated EOB ~20 mins)  Standing Balance: Stand by assistance  Standing Balance  Time: ~10 mins  Activity: Standing from EOB, completed mobility to bathroom, stood to complete LB management, returned to bed  Comment: Pt mildly unsteady throughout with no LOB    Functional Mobility  Functional - Mobility Device: Rolling Walker  Activity: To/from bathroom  Assist Level: Stand by assistance  Functional Mobility Comments: Pt mildly unsteady throughout with no LOB    Toilet Transfers  Toilet - Technique: Ambulating  Equipment Used: Standard toilet  Toilet Transfer: Contact guard assistance  Toilet Transfers Comments: With use of B grab bars    Bed mobility  Rolling to Right: Minimal assistance(For trunk progression)  Supine to Sit: Contact guard assistance  Sit to Supine: Contact guard assistance  Scooting: Stand by assistance  Comment: Pt reports having adjustable bed at home- attempted to mimic home set up with [facility] elevated 70*    Transfers  Sit to stand: Stand by assistance  Stand to sit: Stand by assistance  Transfer Comments: Increased time/effort to complete     Cognition  Overall Cognitive Status: [employer]     Plan   Plan  Times per week: 4-5x/week  Current Treatment Recommendations: Safety Education & Training, Patient/Caregiver Education & Training, Self-Care / ADL, Functional Mobility Training, Equipment Evaluation, Education, & procurement, Endurance Training    Goals  Short term goals  Time Frame for Short term goals: By discharge:  Short term goal 1: Pt will complete bed mobility Independently. Short term goal 2: Pt will complete functional trasnfers/functional mobility Independently. Short term goal 3: Pt will demo UB ADLs with Mod I, use of AD/AE PRN.   Short term goal 4: Pt will demo LB ADLs with Min A,

## 2020-09-10 NOTE — PROGRESS NOTES
General Surgery:  Daily Progress Note            PATIENT NAME: Olga Lidia Bautista II     TODAY'S DATE: 9/10/2020, 5:59 AM      SUBJECTIVE:     Pt seen and examined at bedside. No acute events overnight. Pt continues to improve overall. States feeling better. Passing flatus and having multiple bm. Pt tolerated full liquid diet. Pt is off insulin gtt, blood glucose 238,225. He is afebrile. Up oob, ambulating. Pain controlled. OBJECTIVE:   VITALS:  BP (!) 139/94   Pulse 88   Temp 98.2 °F (36.8 °C) (Oral)   Resp 12   Ht 5' 11\" (1.803 m)   Wt 279 lb 5.2 oz (126.7 kg)   SpO2 98%   BMI 38.96 kg/m²      INTAKE/OUTPUT:      Intake/Output Summary (Last 24 hours) at 9/10/2020 0559  Last data filed at 9/9/2020 1340  Gross per 24 hour   Intake --   Output 800 ml   Net -800 ml       PHYSICAL EXAM:  General Appearance: awake, alert, oriented, in no acute distress  HEENT:  Normocephalic, atraumatic, mucus membranes moist   Heart: Heart sounds are normal.  Regular rate and rhythm without murmur, gallop or rub. Lungs: Normal expansion. Clear to auscultation. No rales, rhonchi, or wheezing. Abdomen: soft, nd, attp. Incision c/d/i w/ staples. Extremities: No cyanosis, pitting edema, rashes noted. Skin: Skin color, texture, turgor normal. No rashes or lesions.     Data:  CBC with Differential:    Lab Results   Component Value Date    WBC 17.1 09/09/2020    RBC 3.54 09/09/2020    HGB 9.6 09/09/2020    HCT 30.9 09/09/2020     09/09/2020    MCV 87.3 09/09/2020    MCH 27.1 09/09/2020    MCHC 31.1 09/09/2020    RDW 14.1 09/09/2020    LYMPHOPCT 12 09/09/2020    MONOPCT 10 09/09/2020    BASOPCT 0 09/09/2020    MONOSABS 1.71 09/09/2020    LYMPHSABS 2.05 09/09/2020    EOSABS 0.34 09/09/2020    BASOSABS 0.00 09/09/2020    DIFFTYPE NOT REPORTED 09/09/2020     CMP:    Lab Results   Component Value Date     09/09/2020    K 4.3 09/09/2020     09/09/2020    CO2 24 09/09/2020    BUN 26 09/09/2020 CREATININE 1.44 09/09/2020    GFRAA >60 09/09/2020    LABGLOM 50 09/09/2020    GLUCOSE 171 09/09/2020    PROT 5.9 09/05/2020    LABALBU 2.0 09/05/2020    CALCIUM 8.3 09/09/2020    BILITOT 0.55 09/05/2020    ALKPHOS 62 09/05/2020    AST 14 09/05/2020    ALT 12 09/05/2020     Hepatic Function Panel:    Lab Results   Component Value Date    ALKPHOS 62 09/05/2020    ALT 12 09/05/2020    AST 14 09/05/2020    PROT 5.9 09/05/2020    BILITOT 0.55 09/05/2020    BILIDIR 0.24 09/02/2020    IBILI 0.19 09/02/2020    LABALBU 2.0 09/05/2020     PT/INR:  No results found for: PROTIME, INR  PTT:  No results found for: APTT, PTT[APTT}    Radiology Review:      Xr Abdomen (kub) (single Ap View)     Addendum Date: 9/1/2020    ADDENDUM: Dose area product 1037.1 mGy per cm squared and fluoro time 5.6 seconds      Result Date: 9/1/2020  EXAMINATION: ONE SUPINE XRAY VIEW(S) OF THE ABDOMEN 9/1/2020 9:32 pm COMPARISON: 16 hours ago HISTORY: ORDERING SYSTEM PROVIDED HISTORY: cysto with right stent TECHNOLOGIST PROVIDED HISTORY: cysto with right stent Reason for Exam: cysto Acuity: Acute Type of Exam: Initial FINDINGS: Double-J ureteral stent present. On the contralateral side there appears to be a nephrolith.      Please correlate with clinical service      Xr Chest Portable     Result Date: 9/1/2020  EXAMINATION: ONE XRAY VIEW OF THE CHEST 9/1/2020 8:31 pm COMPARISON: 05/14/2010 HISTORY: ORDERING SYSTEM PROVIDED HISTORY: evaluation TECHNOLOGIST PROVIDED HISTORY: evaluation Reason for Exam: evaluation Acuity: Acute Type of Exam: Initial FINDINGS: Cardiomediastinal silhouette is unchanged in size. No pulmonary consolidation, pleural effusion, or pneumothorax. No acute osseous abnormality.   Enteric tube is below the diaphragm with tip outside the field of view.      No acute cardiopulmonary abnormality.      Xr Abdomen For Ng/og/ne Tube Placement     Result Date: 9/1/2020  EXAMINATION: ONE SUPINE XRAY VIEW(S) OF THE ABDOMEN 9/1/2020 6:26 am

## 2020-09-10 NOTE — PROGRESS NOTES
[facility] TAO[facility]. Small bowel obstruction ([address] [K56.609]  Small bowel obstruction ([address] [K56.609] . Vitals:    09/10/20 0314   BP: (!) 139/94   Pulse: 88   Resp: 12   Temp: 98.2 °F (36.8 °C)   SpO2: 98%   . Patients home meds are   Prior to Admission medications    Medication Sig Start Date End Date Taking? Authorizing Provider   traMADol (ULTRAM) 50 MG tablet Take 100 mg by mouth every 6 hours as needed for Pain.     Historical Provider, MD   Melatonin 10 MG TABS Take 10 mg by mouth nightly as needed (SLEEP)    Historical Provider, MD   vitamin C (ASCORBIC ACID) 500 MG tablet Take 500 mg by mouth daily    Historical Provider, MD   lidocaine 4 % external patch Place 1 patch onto the skin daily  Patient taking differently: Place 1 patch onto the skin daily as needed (TO HIP)  8/11/20 9/10/20  Daisha Dee MD   acetaminophen (TYLENOL) 500 MG tablet Take 2 tablets by mouth every 6 hours as needed for Pain 8/11/20 8/21/20  Daisha Dee MD   cyclobenzaprine (FLEXERIL) 10 MG tablet Take 1 tablet by mouth 3 times daily as needed for Muscle spasms  Patient taking differently: Take 10 mg by mouth 2 times daily as needed for Muscle spasms  8/11/20   Daisha Dee MD   aspirin 81 MG tablet Take 81 mg by mouth nightly     Historical Provider, MD   SITagliptin (JANUVIA) 50 MG tablet Take 25 mg by mouth daily     Historical Provider, MD   pravastatin (PRAVACHOL) 20 MG tablet Take 20 mg by mouth daily    Historical Provider, MD   metoprolol (LOPRESSOR) 50 MG tablet Take 50 mg by mouth 2 times daily    Historical Provider, MD   lisinopril (PRINIVIL;ZESTRIL) 20 MG tablet Take 20 mg by mouth daily    Historical Provider, MD   allopurinol (ZYLOPRIM) 100 MG tablet Take 100 mg by mouth daily    Historical Provider, MD   zolpidem (AMBIEN) 10 MG tablet Take by mouth nightly as needed for Sleep    Historical Provider, MD   hydrALAZINE (APRESOLINE) 25 MG tablet Take 25 mg by mouth 3 times daily     Historical Provider, MD   furosemide (LASIX) 40 MG tablet Take 40 mg by mouth daily as needed (LEG SWELLING)     Historical Provider, MD   .  Recent Surgical History: None = 0     Assessment     Peak Flow (asthma only)    Predicted: na  Personal Best: na  PEF na  % Predicted na  Peak Flow : not applicable = 0    YHV2/OMN    FEV1 Predicted na      FEV1 na    FEV1 % Predicted na  FVC na  IS volume na  IBW na    RR 16  Breath Sounds: clear      · Bronchodilator assessment at level  1  · Hyperinflation assessment at level   · Secretion Management assessment at level    ·   · []    Bronchodilator Assessment  BRONCHODILATOR ASSESSMENT SCORE  Score 0 1 2 3 4 5   Breath Sounds   []  Patient Baseline [x]  No Wheeze good aeration []  Faint, scattered wheezing, good aeration []  Expiratory Wheezing and or moderately diminished []  Insp/Exp wheeze and/or very diminished []  Insp/Exp and/ or marked distress   Respiratory Rate   []  Patient Baseline [x]  Less than 20 []  Less than 20 []  20-25 []  Greater than 25 []  Greater than 25   Peak flow % of Pred or PB []  NA   []  Greater than 90%  []  81-90% []  71-80% []  Less than or equal to 70%  or unable to perform []  Unable due to Respiratory Distress   Dyspnea re []  Patient Baseline [x]  No SOB []  No SOB []  SOB on exertion []  SOB min activity []  At rest/acute   e FEV% Predicted       []  NA []  Above 69%  []  Unable []  Above 60-69%  []  Unable []  Above 50-59%  []  Unable []  Above 35-49%  []  Unable []  Less than 35%  []  Unable                 []  Hyperinflation Assessment  Score 1 2 3   CXR and Breath Sounds   []  Clear []  No atelectasis  Basilar aeration []  Atelectasis or absent basilar breath sounds   Incentive Spirometry Volume  (Per IBW)   []  Greater than or equal to 15ml/Kg []  less than 15ml/Kg []  less than 15ml/Kg   Surgery within last 2 weeks []  None or general   []  Abdominal or thoracic surgery  []  Abdominal or thoracic   Chronic Pulmonary

## 2020-09-10 NOTE — PLAN OF CARE
Patient and wife present and will continue to provided adequate education on discharge to home safely. Pt. Will require in home PT/OT, This will all assure d/c to home is the proper plan of care for him .

## 2020-09-10 NOTE — PROGRESS NOTES
Pagejulissa Merchant with surgery. Okay to stop TPN and switch IV antibiotics to PO. Alert Dr. Tabitha Graves with internal med.

## 2020-09-10 NOTE — PLAN OF CARE
Problem: Skin Integrity:  Goal: Will show no infection signs and symptoms  Description: Will show no infection signs and symptoms  Outcome: Met This Shift     Problem: Falls - Risk of:  Goal: Will remain free from falls  Description: Will remain free from falls  Outcome: Met This Shift  Note: Patient's bed remained locked and in lowest position throughout shift. Patient belonings and call light remain within reach. 2/4 side rails are up, and non-skid footwear is on. Problem: Serum Glucose Level - Abnormal:  Goal: Ability to maintain appropriate glucose levels will improve  Description: Ability to maintain appropriate glucose levels will improve  9/10/2020 0349 by Tae Nichols RN  Outcome: Ongoing    Problem: Nutrition  Goal: Optimal nutrition therapy  Description: Nutrition Problem #1: Inadequate oral intake  Intervention: Food and/or Nutrient Delivery: Continue NPO. Start nutrition as able; Monitor plans re:PICC placement/ initiation of parenteral nutrition support. Nutritional Goals: meet % of estimated nutrition needs     Outcome: Ongoing     Problem: Pain:  Description: Pain management should include both nonpharmacologic and pharmacologic interventions. Goal: Pain level will decrease  Description: Pain level will decrease  9/10/2020 0349 by Tae Nichols RN  Outcome: Ongoing  Note: Patient rated pain a 5  on a scale from 0-10. Writer administered scheduled and PRN pain medications to patient.     Electronically signed by Tae Nichols RN on 9/10/2020 at 3:50 AM

## 2020-09-10 NOTE — PROGRESS NOTES
Diabetes Education rounded on patient who is more alert today and sitting up in chair. Patient is now eating soft foods and tolerating and states he is on \"Low CHO\"  plan. He is also getting insulin per ISS at meals and Lantus which the patient understands it to be his long lasting insulin. Pt given sample copy of high ISS that is being used for correction at meals and reviewed. Discussed with patient once he is tolerating solid foods better to decrease sugary beverages such as juice, Reg pop. Patient is aware those contain a lot of sugar which elelvate BG. Patient is willing to give self injection during hospital stay. Patient states his wife is working on trying to get him in an Endocrinologist. Patient was encouraged to get a PCP first as an Endocrinologist requires a referral. Patient thinks he will be going home and will be getting visiting nurse. Discussed with patient that the visiting nurse can also help with making sure he understands his insulin regimen for home and doing injections correctly. Patient state he has handouts and did review.

## 2020-09-10 NOTE — PROGRESS NOTES
Urology Progress Note  CC:  Left renal mass  Subjective:   Patricia Davison is a 56 y.o. male. His/Her current Diet is: PN-Adult 2-in-1 Central Line (Standard)  Dietary Nutrition Supplements:  DIET LOW FIBER;. The patient is 8 Days Post-Op from Procedure(s):  LAPAROSCOPIC XI ROBOTIC NEPHRECTOMY  EXPLORATORY LAPAROTOMY, RESECTION OF PROXIMAL JEJUNUM AND DESCENDING COLON WITH MOBILIZATION OF SPLENIC FLEXURE AND PRIMARY ANASTAMOSISOF SMALL BOWEL AND COLON, PLACEMENT OF GASTROSTOMY TUBE  No acute urologic events overnight. No chest pain, shortness of breath, vomiting, or  Chills. No fevers His nausea improved/resolved. Tolerated clears. G tube clamped yesterday and general surgery following/advancing diet today. Ambulating in hallway. Had BM and passing flatus.      Gastrostomy tube with  was clamped yesterday  UOP 2550 cc in 24 hr    Patient Vitals for the past 24 hrs:   BP Temp Temp src Pulse Resp SpO2 Height Weight   09/10/20 0608 -- -- -- -- -- -- -- 275 lb 9.2 oz (125 kg)   09/10/20 0314 (!) 139/94 98.2 °F (36.8 °C) Oral 88 12 98 % -- --   09/09/20 2028 (!) 158/92 98.9 °F (37.2 °C) Oral 101 19 95 % -- --   09/09/20 1550 (!) 150/97 98.5 °F (36.9 °C) Oral 118 18 97 % -- --   09/09/20 1353 -- -- -- -- -- -- 5' 11\" (1.803 m) --   09/09/20 1147 (!) 140/102 98.3 °F (36.8 °C) Oral 92 16 96 % -- --   09/09/20 0745 (!) 130/90 98.4 °F (36.9 °C) Oral 93 15 95 % -- --       Intake/Output Summary (Last 24 hours) at 9/10/2020 0643  Last data filed at 9/10/2020 0606  Gross per 24 hour   Intake 1345 ml   Output 2550 ml   Net -1205 ml       Recent Labs     09/07/20  0650 09/08/20  0512 09/09/20  0510   WBC 19.8* 18.1* 17.1*   HGB 10.4* 9.6* 9.6*   HCT 34.9* 32.0* 30.9*   MCV 90.2 90.4 87.3    322 386     Recent Labs     09/08/20  0512 09/08/20 2013 09/09/20  0510    138 140   K 4.1 4.0 4.3    107 106   CO2 21 20 24   PHOS 2.4* 2.8 2.9   BUN 30* 29* 26*   CREATININE 1.41* 1.39* 1.44*       No results for input(s): COLORU, PHUR, LABCAST, WBCUA, RBCUA, MUCUS, TRICHOMONAS, YEAST, BACTERIA, CLARITYU, SPECGRAV, LEUKOCYTESUR, UROBILINOGEN, Gopi Gauze in the last 72 hours.     Invalid input(s): NITRATE, GLUCOSEUKETONESUAMORPHOUS    Current Facility-Administered Medications   Medication Dose Route Frequency Provider Last Rate Last Dose    insulin glargine (LANTUS) injection vial 50 Units  50 Units Subcutaneous Nightly Cynthia Zepeda MD   50 Units at 09/09/20 2025    PN-Adult 2-in-1 Central Line (Standard)   Intravenous Continuous TPN Jason Enriquez, DO 65 mL/hr at 09/09/20 1745      metoclopramide (REGLAN) injection 10 mg  10 mg Intravenous Q6H Jason Enriquez, DO   10 mg at 09/10/20 0034    iron sucrose (VENOFER) 300 mg in sodium chloride 0.9 % 250 mL IVPB  300 mg Intravenous Q24H Cynthia Zepeda MD   Stopped at 09/09/20 1232    albuterol (PROVENTIL) nebulizer solution 2.5 mg  2.5 mg Nebulization Q6H PRN Paloma Serrano MD        glucose (GLUTOSE) 40 % oral gel 15 g  15 g Oral PRN Fatimah Hernandez MD        dextrose 50 % IV solution  12.5 g Intravenous PRN Fatimah Hernandez MD        glucagon (rDNA) injection 1 mg  1 mg Intramuscular PRN Fatimah Hernandez MD        dextrose 5 % solution  100 mL/hr Intravenous PRN Fatimah Hernandez MD        HYDROmorphone (DILAUDID) injection 0.5 mg  0.5 mg Intravenous Q6H PRN Marion Enriquez, DO   0.5 mg at 09/10/20 0305    insulin lispro (HUMALOG) injection vial 0-18 Units  0-18 Units Subcutaneous Q6H Marinus Berliner, DO   6 Units at 09/10/20 0306    [Held by provider] insulin lispro (HUMALOG) injection vial 0-9 Units  0-9 Units Subcutaneous Nightly Marinus Berliner, DO        acetaminophen (TYLENOL) tablet 1,000 mg  1,000 mg Oral Q8H Jason Enriquez, DO   1,000 mg at 09/09/20 2302    methocarbamol (ROBAXIN) tablet 750 mg  750 mg Oral Q6H Jason Enriquez, DO   750 mg at 09/09/20 2252    gabapentin (NEURONTIN) capsule 300 mg  300 mg Oral Q8H Marion Enriquez, DO   300 mg at 09/09/20 2252    oxyCODONE (ROXICODONE) immediate release tablet 5 mg  5 mg Oral Q4H PRN Melinda Adamson DO   5 mg at 09/10/20 0034    0.9 % sodium chloride bolus  20 mL Intravenous Once Chapin Jones MD   Stopped at 09/04/20 0836    metoprolol (LOPRESSOR) injection 5 mg  5 mg Intravenous Q4H Matthew Hyatt MD   5 mg at 09/10/20 0305    fat emulsion 20 % infusion 250 mL  250 mL Intravenous Daily Melinda Adamson DO   Stopped at 09/10/20 0544    metoprolol (LOPRESSOR) injection 5 mg  5 mg Intravenous Q6H PRN Matthew Hyatt MD   5 mg at 09/09/20 1558    famotidine (PEPCID) injection 20 mg  20 mg Intravenous Daily Sanchez Shea MD   20 mg at 09/09/20 0925    cefepime (MAXIPIME) 1 g IVPB minibag  1 g Intravenous Q12H Hermilo Hanna MD   Stopped at 09/10/20 0335    sodium chloride flush 0.9 % injection 10 mL  10 mL Intravenous 2 times per day Reba Ruiz MD   10 mL at 09/08/20 0905    sodium chloride flush 0.9 % injection 10 mL  10 mL Intravenous PRN Reba Ruiz MD        sodium chloride flush 0.9 % injection 10 mL  10 mL Intravenous 2 times per day Reba Ruiz MD   10 mL at 09/09/20 2029    sodium chloride flush 0.9 % injection 10 mL  10 mL Intravenous PRN Reba Ruiz MD        heparin (porcine) injection 5,000 Units  5,000 Units Subcutaneous 3 times per day Reba Ruiz MD   5,000 Units at 09/09/20 2252    metronidazole (FLAGYL) 500 mg in NaCl 100 mL IVPB premix  500 mg Intravenous Liseth Durant MD   Stopped at 09/10/20 0443    sodium chloride flush 0.9 % injection 10 mL  10 mL Intravenous 2 times per day Reba Ruiz MD   10 mL at 09/09/20 0926    sodium chloride flush 0.9 % injection 10 mL  10 mL Intravenous PRN Reba Ruiz MD        ondansetron (ZOFRAN) injection 4 mg  4 mg Intravenous Q6H PRN Reba Ruiz MD                Additional Lab/culture results:  BCx 8/31 ng x 6 days   BCx 9/2 ng x 6 days   UCx no growth from 9/2  UCx no growth from 9/1  UCx no growth from 8/31    Physical Exam:   NAD  AOx3  Peripheral pulses palpable  Respirations nonlabored, symmetric chest rise bilaterally, on nasal cannula oxygen  Soft, appropriately tender, tympanic to percussion /midline incisions with gaps between staples, mild erythema, no expressible fluid. Other incisions c/d/i  G tube with bilious drainage and currently clamped. No calf ttp bilaterally  EPC cuffs on and functioning billaterally      Interval Imaging Findings:    Impression:   Gabrielle Che II is a 56 y.o. male with   Problem(s):  POD 8 from  Laparoscopic robotic nephrectomy and exploratory laparotomy with bowel resection of proximal jejunum and descending colon  - Acute blood loss anemia   - small bowel obstruction likely secondary to mesenteric invasion of tumor  - acute kidney injury on CKD stage IIIA (baseline Cr 1.4)  - right distal ureteral stone s/p cystoscopy and right ureteral stent placement by Dr Manuelito Mcqueen  - platelet dysfunction on aspirin 81 mg last dose 8/28/20  - comorbid conditions: diabetes mellitus type 2, HTN, obesity and chronic kidney disease.  - Pathology: Negative for malignancy, Left Kidney with xanthogranulomatous pyelonephritis     Plan:   Diet:  Full liquid, having G tube clamped since yeterday. Diet per general surgery. Treatment of right ureteral calculi in a few weeks once recovered on an outpatient basis, maintain stent. I spoke with patient and wife and discussed pathology as well as answered all questions. They did ask appropriate questions. IV fluids : at 100 ml/  Hour and TPN for nutrition for healing. Antibiotics: On cefepime  and Flagyl  Leukocytosis slightly improving 17 yesterday from 18. CT abd/pel form 9/7 without abscess.   Hemoglobin trend is stable at 9.6 from 9.6  DEBBI resolving, creatinine 1.4 yesterday, will recheck this AM. Has CKD at baseline  Hep 5000 U q8 hrs   Pain control:  As needed Roxicodone and Dilaudid  Ambulation / IS 10 times per hour , physical therapy      Jennifer Tsang MD  Urology Resident, PGY-4  6:43 AM 9/10/2020

## 2020-09-10 NOTE — PROGRESS NOTES
Extremities:  No lower extremity edema, ulcerations, tenderness, varicosities or erythema. Muscle size, tone and strength are normal.  No involuntary movements are noted. Skin:  Warm and dry. Good color, turgor and pigmentation. No lesions or scars.   No cyanosis or clubbing  Neurological/Psychiatric: The patient's general behavior, level of consciousness, thought content and emotional status is normal.        Medications:  Scheduled Medications:    insulin glargine  60 Units Subcutaneous Nightly    metoclopramide  10 mg Intravenous Q6H    iron sucrose  300 mg Intravenous Q24H    insulin lispro  0-18 Units Subcutaneous Q6H    insulin lispro  0-9 Units Subcutaneous Nightly    acetaminophen  1,000 mg Oral Q8H    methocarbamol  750 mg Oral Q6H    gabapentin  300 mg Oral Q8H    sodium chloride  20 mL Intravenous Once    metoprolol  5 mg Intravenous Q4H    fat emulsion  250 mL Intravenous Daily    famotidine (PEPCID) injection  20 mg Intravenous Daily    cefepime  1 g Intravenous Q12H    sodium chloride flush  10 mL Intravenous 2 times per day    sodium chloride flush  10 mL Intravenous 2 times per day    heparin (porcine)  5,000 Units Subcutaneous 3 times per day    metroNIDAZOLE  500 mg Intravenous Q8H    sodium chloride flush  10 mL Intravenous 2 times per day     Continuous Infusions:    PN-Adult 2-in-1 Central Line (Standard) 65 mL/hr at 09/09/20 1745    dextrose       PRN Medicationsalbuterol, 2.5 mg, Q6H PRN  glucose, 15 g, PRN  dextrose, 12.5 g, PRN  glucagon (rDNA), 1 mg, PRN  dextrose, 100 mL/hr, PRN  HYDROmorphone, 0.5 mg, Q6H PRN  oxyCODONE, 5 mg, Q4H PRN  metoprolol, 5 mg, Q6H PRN  sodium chloride flush, 10 mL, PRN  sodium chloride flush, 10 mL, PRN  sodium chloride flush, 10 mL, PRN  ondansetron, 4 mg, Q6H PRN      Diagnostic Labs:  CBC:   Recent Labs     09/08/20  0512 09/09/20  0510   WBC 18.1* 17.1*   RBC 3.54* 3.54*   HGB 9.6* 9.6*   HCT 32.0* 30.9*   MCV 90.4 87.3   RDW 13.8 14.1    386     BMP:   Recent Labs     09/08/20  0512 09/08/20 2013 09/09/20  0510    138 140   K 4.1 4.0 4.3    107 106   CO2 21 20 24   PHOS 2.4* 2.8 2.9   BUN 30* 29* 26*   CREATININE 1.41* 1.39* 1.44*     BNP: No results for input(s): BNP in the last 72 hours. PT/INR: No results for input(s): PROTIME, INR in the last 72 hours. APTT: No results for input(s): APTT in the last 72 hours. CARDIAC ENZYMES: No results for input(s): CKMB, CKMBINDEX, TROPONINI in the last 72 hours. Invalid input(s): CKTOTAL;3  FASTING LIPID PANEL:  Lab Results   Component Value Date    CHOL 131 06/22/2020    HDL 31 (L) 06/22/2020    TRIG 115 09/05/2020     LIVER PROFILE: No results for input(s): AST, ALT, ALB, BILIDIR, BILITOT, ALKPHOS in the last 72 hours. MICROBIOLOGY:   Lab Results   Component Value Date/Time    CULTURE NO GROWTH 6 DAYS 09/02/2020 04:49 AM       Imaging:    Ct Abdomen Pelvis Wo Contrast Additional Contrast? Oral And Rectal    Result Date: 9/7/2020  1. Interval left nephrectomy and small bowel and descending colon resection with primary anastomosis. No extravasation of administered oral contrast from the bowel to indicate bowel anastomotic leakage. 2. Small volume free intraperitoneal fluid, extensive left peritoneal reticulations and stranding and scattered pockets of free intraperitoneal air consistent with recent laparotomy. 3. Large filling defects in the sigmoid colon downstream from the colonic anastomosis probably combination of stool and blood products 4. Percutaneous gastrostomy tube in place. 5. Small left pleural effusion and subsegmental atelectasis. Trace right pleural effusion and subsegmental atelectasis. Xr Chest Portable    Result Date: 9/3/2020  Line placement as above Expiratory exam with interval increase in now mild streaky bibasilar atelectasis     Xr Abdomen For Ng/og/ne Tube Placement    Result Date: 9/2/2020  Enteric tube terminates at the GE junction. Recommend advancement prior to use. Xr Abdomen (2 Views)    Result Date: 9/7/2020  Normal bowel gas pattern. ASSESSMENT & PLAN     ASSESSMENT / PLAN:      Active Problems:    1. Small bowel obstruction (HCC)  - S/P SB resection with primary anastomosis, left partial colectomy with primary anastomosis, open gastrectomy tube 09/02  - Diet advance to low fiber, will continue to monitor if the patient is able to tolerate.   - Continue antibiotics cefepime and flagyl, day 9 today, will d/c after completing 10 days if okay per General Surgery.     2. Renal Cell Carcinoma:  - S/P robotic nephrectomy 09/03  - Urology on board - Follow up OP for right renal nephrolithiasis.      3. Sepsis secondary to UTI   - Continue cefepime and metronidazole, day 9 today, will d/c tomorrow. - Negative blood cultures and urine cultures, urine positive for leukocytes. - Leukocytosis improving.      4. Type II Diabetes Mellitus, HbA1c 11  - Continue Lantus 60 U nightly, low dose SSI for now.  this am.   - Diabetes education completed, plan to discharge on the same. 5. Benign essential Hypertension:  - Start metoprolol due to tachycardia, home dose hydralazine, will add lisinopril as tolerated. 6. Blood loss anemia  - She had acute blood loss anemia after the surgery. - Hemoglobin stable, on I.v. venofer for now, will discharge on oral iron and follow up OP. 7. Chronic kidney disease:  - Currently Cr at baseline. 8. Superficial Thrombophlebitis:  Cephalic vein thrombophlebitis. Warm compresses. Elevate the arm    DVT ppx : heparin   GI ppx: pepcid      PT/OT: ongoing  Discharge Planning / SW: Plan is currently home, will follow up case management plans. Fabricio Akhtar MD  Internal Medicine Resident, PGY-2  [facility];  [place], [place]  9/10/2020, 8:49 AM

## 2020-09-10 NOTE — PROGRESS NOTES
Physician Progress Note      PATIENT:               Reji Romero  CSN #:                  [id number]  :                       1960  ADMIT DATE:       2020 11:04 PM  [address] [place] DATE:  RESPONDING  PROVIDER #:        Kevin Romero MD          QUERY TEXT:    Patient admitted with SBO/renal mass. Documentation reflects Sepsis in   progress notes If possible, please document in the progress notes and   discharge summary if Sepsis was: The medical record reflects the following:  Risk Factors: Mesenteric invasion of renal mass  Clinical Indicators: H&P \"Sepsis secondary to SBO\",  procalcitonin 0. .21 WBC   22 , SBO, lactic acid  of 3.2 and 1.9  Treatment: IV Maxipime, labs, monitoring    Thank-you,  Raya Machado RN, CDS  Please Epic inbox/Perfect serve for questions. Options provided:  -- Sepsis treated and resolved  -- Sepsis ruled out after study  -- Sepsis confirmed after study  -- Other - I will add my own diagnosis  -- Disagree - Not applicable / Not valid  -- Disagree - Clinically unable to determine / Unknown  -- Refer to Clinical Documentation Reviewer    PROVIDER RESPONSE TEXT:    Sepsis treated and resolved.     Query created by: Tania Keller on 9/10/2020 7:20 AM      Electronically signed by:  Kevin Romero MD 9/10/2020 9:24 AM

## 2020-09-11 ENCOUNTER — APPOINTMENT (OUTPATIENT)
Dept: GENERAL RADIOLOGY | Age: 60
DRG: 853 | End: 2020-09-11
Attending: INTERNAL MEDICINE
Payer: COMMERCIAL

## 2020-09-11 VITALS
DIASTOLIC BLOOD PRESSURE: 100 MMHG | WEIGHT: 275.57 LBS | TEMPERATURE: 98.6 F | OXYGEN SATURATION: 96 % | HEIGHT: 71 IN | RESPIRATION RATE: 19 BRPM | BODY MASS INDEX: 38.58 KG/M2 | SYSTOLIC BLOOD PRESSURE: 141 MMHG | HEART RATE: 108 BPM

## 2020-09-11 LAB
ABSOLUTE EOS #: 1.12 K/UL (ref 0–0.4)
ABSOLUTE IMMATURE GRANULOCYTE: 0.75 K/UL (ref 0–0.3)
ABSOLUTE LYMPH #: 1.87 K/UL (ref 1–4.8)
ABSOLUTE MONO #: 1.87 K/UL (ref 0.1–0.8)
ANION GAP SERPL CALCULATED.3IONS-SCNC: 10 MMOL/L (ref 9–17)
ANION GAP SERPL CALCULATED.3IONS-SCNC: 15 MMOL/L (ref 9–17)
BASOPHILS # BLD: 0 % (ref 0–2)
BASOPHILS ABSOLUTE: 0 K/UL (ref 0–0.2)
BUN BLDV-MCNC: 23 MG/DL (ref 8–23)
BUN BLDV-MCNC: 25 MG/DL (ref 8–23)
BUN/CREAT BLD: ABNORMAL (ref 9–20)
BUN/CREAT BLD: ABNORMAL (ref 9–20)
CALCIUM SERPL-MCNC: 8.5 MG/DL (ref 8.6–10.4)
CALCIUM SERPL-MCNC: 8.6 MG/DL (ref 8.6–10.4)
CHLORIDE BLD-SCNC: 102 MMOL/L (ref 98–107)
CHLORIDE BLD-SCNC: 103 MMOL/L (ref 98–107)
CO2: 17 MMOL/L (ref 20–31)
CO2: 22 MMOL/L (ref 20–31)
CREAT SERPL-MCNC: 1.48 MG/DL (ref 0.7–1.2)
CREAT SERPL-MCNC: 1.64 MG/DL (ref 0.7–1.2)
DIFFERENTIAL TYPE: ABNORMAL
EOSINOPHILS RELATIVE PERCENT: 6 % (ref 1–4)
GFR AFRICAN AMERICAN: 52 ML/MIN
GFR AFRICAN AMERICAN: 58 ML/MIN
GFR NON-AFRICAN AMERICAN: 43 ML/MIN
GFR NON-AFRICAN AMERICAN: 48 ML/MIN
GFR SERPL CREATININE-BSD FRML MDRD: ABNORMAL ML/MIN/{1.73_M2}
GLUCOSE BLD-MCNC: 129 MG/DL (ref 75–110)
GLUCOSE BLD-MCNC: 133 MG/DL (ref 70–99)
GLUCOSE BLD-MCNC: 170 MG/DL (ref 70–99)
GLUCOSE BLD-MCNC: 192 MG/DL (ref 75–110)
HCT VFR BLD CALC: 32.3 % (ref 40.7–50.3)
HEMOGLOBIN: 10 G/DL (ref 13–17)
IMMATURE GRANULOCYTES: 4 %
LYMPHOCYTES # BLD: 10 % (ref 24–44)
MAGNESIUM: 1.9 MG/DL (ref 1.6–2.6)
MAGNESIUM: 2.2 MG/DL (ref 1.6–2.6)
MCH RBC QN AUTO: 27.4 PG (ref 25.2–33.5)
MCHC RBC AUTO-ENTMCNC: 31 G/DL (ref 28.4–34.8)
MCV RBC AUTO: 88.5 FL (ref 82.6–102.9)
MONOCYTES # BLD: 10 % (ref 1–7)
MORPHOLOGY: NORMAL
NRBC AUTOMATED: 0 PER 100 WBC
PDW BLD-RTO: 14.3 % (ref 11.8–14.4)
PHOSPHORUS: 3.1 MG/DL (ref 2.5–4.5)
PLATELET # BLD: 447 K/UL (ref 138–453)
PLATELET ESTIMATE: ABNORMAL
PMV BLD AUTO: 12.6 FL (ref 8.1–13.5)
POTASSIUM SERPL-SCNC: 4.6 MMOL/L (ref 3.7–5.3)
POTASSIUM SERPL-SCNC: 5.5 MMOL/L (ref 3.7–5.3)
RBC # BLD: 3.65 M/UL (ref 4.21–5.77)
RBC # BLD: ABNORMAL 10*6/UL
SEG NEUTROPHILS: 70 % (ref 36–66)
SEGMENTED NEUTROPHILS ABSOLUTE COUNT: 13.09 K/UL (ref 1.8–7.7)
SODIUM BLD-SCNC: 134 MMOL/L (ref 135–144)
SODIUM BLD-SCNC: 135 MMOL/L (ref 135–144)
WBC # BLD: 18.7 K/UL (ref 3.5–11.3)
WBC # BLD: ABNORMAL 10*3/UL

## 2020-09-11 PROCEDURE — 85025 COMPLETE CBC W/AUTO DIFF WBC: CPT

## 2020-09-11 PROCEDURE — 6360000002 HC RX W HCPCS: Performed by: STUDENT IN AN ORGANIZED HEALTH CARE EDUCATION/TRAINING PROGRAM

## 2020-09-11 PROCEDURE — 2500000003 HC RX 250 WO HCPCS: Performed by: STUDENT IN AN ORGANIZED HEALTH CARE EDUCATION/TRAINING PROGRAM

## 2020-09-11 PROCEDURE — 71045 X-RAY EXAM CHEST 1 VIEW: CPT

## 2020-09-11 PROCEDURE — 94640 AIRWAY INHALATION TREATMENT: CPT

## 2020-09-11 PROCEDURE — 6370000000 HC RX 637 (ALT 250 FOR IP): Performed by: STUDENT IN AN ORGANIZED HEALTH CARE EDUCATION/TRAINING PROGRAM

## 2020-09-11 PROCEDURE — 2580000003 HC RX 258: Performed by: STUDENT IN AN ORGANIZED HEALTH CARE EDUCATION/TRAINING PROGRAM

## 2020-09-11 PROCEDURE — 82947 ASSAY GLUCOSE BLOOD QUANT: CPT

## 2020-09-11 PROCEDURE — 94761 N-INVAS EAR/PLS OXIMETRY MLT: CPT

## 2020-09-11 PROCEDURE — 80048 BASIC METABOLIC PNL TOTAL CA: CPT

## 2020-09-11 PROCEDURE — 84100 ASSAY OF PHOSPHORUS: CPT

## 2020-09-11 PROCEDURE — 36415 COLL VENOUS BLD VENIPUNCTURE: CPT

## 2020-09-11 PROCEDURE — 99239 HOSP IP/OBS DSCHRG MGMT >30: CPT | Performed by: INTERNAL MEDICINE

## 2020-09-11 PROCEDURE — 93005 ELECTROCARDIOGRAM TRACING: CPT | Performed by: STUDENT IN AN ORGANIZED HEALTH CARE EDUCATION/TRAINING PROGRAM

## 2020-09-11 PROCEDURE — 51798 US URINE CAPACITY MEASURE: CPT

## 2020-09-11 PROCEDURE — G0108 DIAB MANAGE TRN  PER INDIV: HCPCS

## 2020-09-11 PROCEDURE — 83735 ASSAY OF MAGNESIUM: CPT

## 2020-09-11 RX ORDER — BLOOD PRESSURE TEST KIT
1 KIT MISCELLANEOUS 4 TIMES DAILY
Qty: 120 EACH | Refills: 0 | Status: ON HOLD | OUTPATIENT
Start: 2020-09-11 | End: 2020-10-02 | Stop reason: HOSPADM

## 2020-09-11 RX ORDER — OXYCODONE HYDROCHLORIDE 5 MG/1
5 TABLET ORAL EVERY 4 HOURS PRN
Status: DISCONTINUED | OUTPATIENT
Start: 2020-09-11 | End: 2020-09-11

## 2020-09-11 RX ORDER — FERROUS SULFATE 325(65) MG
325 TABLET ORAL
Qty: 60 TABLET | Refills: 0 | Status: SHIPPED | OUTPATIENT
Start: 2020-09-11 | End: 2020-09-11 | Stop reason: SDUPTHER

## 2020-09-11 RX ORDER — LEVOFLOXACIN 750 MG/1
750 TABLET ORAL DAILY
Qty: 4 TABLET | Refills: 0 | Status: SHIPPED | OUTPATIENT
Start: 2020-09-11 | End: 2020-09-15

## 2020-09-11 RX ORDER — LANCETS 30 GAUGE
1 EACH MISCELLANEOUS 3 TIMES DAILY
Qty: 200 EACH | Refills: 0 | Status: ON HOLD | OUTPATIENT
Start: 2020-09-11 | End: 2020-10-02 | Stop reason: HOSPADM

## 2020-09-11 RX ORDER — PANTOPRAZOLE SODIUM 40 MG/1
40 TABLET, DELAYED RELEASE ORAL DAILY
Qty: 60 TABLET | Refills: 0 | Status: SHIPPED | OUTPATIENT
Start: 2020-09-11 | End: 2020-12-16

## 2020-09-11 RX ORDER — INSULIN LISPRO 100 [IU]/ML
0-6 INJECTION, SOLUTION INTRAVENOUS; SUBCUTANEOUS
Qty: 5 PEN | Refills: 0 | Status: SHIPPED | OUTPATIENT
Start: 2020-09-11 | End: 2020-10-28

## 2020-09-11 RX ORDER — FERROUS SULFATE 325(65) MG
325 TABLET ORAL
Qty: 60 TABLET | Refills: 0 | Status: SHIPPED | OUTPATIENT
Start: 2020-09-11 | End: 2020-12-28 | Stop reason: SDUPTHER

## 2020-09-11 RX ORDER — PEN NEEDLE, DIABETIC 31 G X1/4"
1 NEEDLE, DISPOSABLE MISCELLANEOUS DAILY
Qty: 100 EACH | Refills: 3 | Status: ON HOLD | OUTPATIENT
Start: 2020-09-11 | End: 2020-10-02 | Stop reason: HOSPADM

## 2020-09-11 RX ORDER — METRONIDAZOLE 500 MG/1
500 TABLET ORAL 3 TIMES DAILY
Qty: 12 TABLET | Refills: 0 | Status: SHIPPED | OUTPATIENT
Start: 2020-09-11 | End: 2020-09-11 | Stop reason: SDUPTHER

## 2020-09-11 RX ORDER — LEVOFLOXACIN 750 MG/1
750 TABLET ORAL DAILY
Qty: 4 TABLET | Refills: 0 | Status: SHIPPED | OUTPATIENT
Start: 2020-09-11 | End: 2020-09-11 | Stop reason: SDUPTHER

## 2020-09-11 RX ORDER — LANCETS 30 GAUGE
1 EACH MISCELLANEOUS 3 TIMES DAILY
Qty: 200 EACH | Refills: 0 | Status: SHIPPED | OUTPATIENT
Start: 2020-09-11 | End: 2020-09-11 | Stop reason: SDUPTHER

## 2020-09-11 RX ORDER — METRONIDAZOLE 500 MG/1
500 TABLET ORAL 3 TIMES DAILY
Qty: 12 TABLET | Refills: 0 | Status: SHIPPED | OUTPATIENT
Start: 2020-09-11 | End: 2020-09-15

## 2020-09-11 RX ORDER — OXYCODONE HYDROCHLORIDE 5 MG/1
10 TABLET ORAL EVERY 4 HOURS PRN
Status: DISCONTINUED | OUTPATIENT
Start: 2020-09-11 | End: 2020-09-11

## 2020-09-11 RX ORDER — GLUCOSAMINE HCL/CHONDROITIN SU 500-400 MG
CAPSULE ORAL
Qty: 120 STRIP | Refills: 0 | Status: SHIPPED | OUTPATIENT
Start: 2020-09-11 | End: 2020-09-11 | Stop reason: SDUPTHER

## 2020-09-11 RX ORDER — GLUCOSAMINE HCL/CHONDROITIN SU 500-400 MG
CAPSULE ORAL
Qty: 120 STRIP | Refills: 0 | Status: ON HOLD | OUTPATIENT
Start: 2020-09-11 | End: 2020-12-21 | Stop reason: SDUPTHER

## 2020-09-11 RX ORDER — BLOOD PRESSURE TEST KIT
1 KIT MISCELLANEOUS 4 TIMES DAILY
Qty: 120 EACH | Refills: 0 | Status: SHIPPED | OUTPATIENT
Start: 2020-09-11 | End: 2020-09-11 | Stop reason: SDUPTHER

## 2020-09-11 RX ORDER — MAGNESIUM SULFATE 1 G/100ML
1 INJECTION INTRAVENOUS ONCE
Status: COMPLETED | OUTPATIENT
Start: 2020-09-11 | End: 2020-09-11

## 2020-09-11 RX ORDER — PANTOPRAZOLE SODIUM 40 MG/1
40 TABLET, DELAYED RELEASE ORAL DAILY
Qty: 60 TABLET | Refills: 0 | Status: SHIPPED | OUTPATIENT
Start: 2020-09-11 | End: 2020-09-11 | Stop reason: SDUPTHER

## 2020-09-11 RX ORDER — INSULIN GLARGINE 100 [IU]/ML
60 INJECTION, SOLUTION SUBCUTANEOUS NIGHTLY
Qty: 5 PEN | Refills: 1 | Status: ON HOLD | OUTPATIENT
Start: 2020-09-11 | End: 2020-10-02 | Stop reason: HOSPADM

## 2020-09-11 RX ORDER — BLOOD-GLUCOSE METER
1 KIT MISCELLANEOUS DAILY
Qty: 1 KIT | Refills: 0 | Status: ON HOLD | OUTPATIENT
Start: 2020-09-11 | End: 2020-10-02 | Stop reason: HOSPADM

## 2020-09-11 RX ORDER — ALBUTEROL SULFATE 2.5 MG/3ML
2.5 SOLUTION RESPIRATORY (INHALATION)
Status: COMPLETED | OUTPATIENT
Start: 2020-09-11 | End: 2020-09-11

## 2020-09-11 RX ORDER — GABAPENTIN 300 MG/1
300 CAPSULE ORAL 3 TIMES DAILY
Qty: 21 CAPSULE | Refills: 0 | Status: SHIPPED | OUTPATIENT
Start: 2020-09-11 | End: 2020-10-28

## 2020-09-11 RX ORDER — PEN NEEDLE, DIABETIC 31 G X1/4"
1 NEEDLE, DISPOSABLE MISCELLANEOUS DAILY
Qty: 100 EACH | Refills: 3 | Status: SHIPPED | OUTPATIENT
Start: 2020-09-11 | End: 2020-09-11 | Stop reason: SDUPTHER

## 2020-09-11 RX ADMIN — METOPROLOL TARTRATE 5 MG: 1 INJECTION, SOLUTION INTRAVENOUS at 06:48

## 2020-09-11 RX ADMIN — OXYCODONE HYDROCHLORIDE 5 MG: 5 TABLET ORAL at 02:33

## 2020-09-11 RX ADMIN — INSULIN LISPRO 3 UNITS: 100 INJECTION, SOLUTION INTRAVENOUS; SUBCUTANEOUS at 15:10

## 2020-09-11 RX ADMIN — CEFEPIME HYDROCHLORIDE 1 G: 1 INJECTION, POWDER, FOR SOLUTION INTRAMUSCULAR; INTRAVENOUS at 03:14

## 2020-09-11 RX ADMIN — SODIUM CHLORIDE, PRESERVATIVE FREE 10 ML: 5 INJECTION INTRAVENOUS at 09:39

## 2020-09-11 RX ADMIN — HEPARIN SODIUM 5000 UNITS: 5000 INJECTION INTRAVENOUS; SUBCUTANEOUS at 06:48

## 2020-09-11 RX ADMIN — METHOCARBAMOL TABLETS 750 MG: 750 TABLET, COATED ORAL at 06:56

## 2020-09-11 RX ADMIN — METRONIDAZOLE 500 MG: 500 INJECTION, SOLUTION INTRAVENOUS at 03:14

## 2020-09-11 RX ADMIN — ALBUTEROL SULFATE 2.5 MG: 2.5 SOLUTION RESPIRATORY (INHALATION) at 10:33

## 2020-09-11 RX ADMIN — MAGNESIUM SULFATE HEPTAHYDRATE 1 G: 1 INJECTION, SOLUTION INTRAVENOUS at 13:39

## 2020-09-11 RX ADMIN — METRONIDAZOLE 500 MG: 500 INJECTION, SOLUTION INTRAVENOUS at 14:09

## 2020-09-11 RX ADMIN — METOPROLOL TARTRATE 50 MG: 50 TABLET, FILM COATED ORAL at 09:37

## 2020-09-11 RX ADMIN — GABAPENTIN 300 MG: 300 CAPSULE ORAL at 13:36

## 2020-09-11 RX ADMIN — HEPARIN SODIUM 5000 UNITS: 5000 INJECTION INTRAVENOUS; SUBCUTANEOUS at 13:37

## 2020-09-11 RX ADMIN — ACETAMINOPHEN 1000 MG: 500 TABLET ORAL at 06:51

## 2020-09-11 RX ADMIN — METHOCARBAMOL TABLETS 750 MG: 750 TABLET, COATED ORAL at 13:36

## 2020-09-11 RX ADMIN — METHOCARBAMOL TABLETS 750 MG: 750 TABLET, COATED ORAL at 18:44

## 2020-09-11 RX ADMIN — GABAPENTIN 300 MG: 300 CAPSULE ORAL at 06:48

## 2020-09-11 RX ADMIN — METOPROLOL TARTRATE 5 MG: 1 INJECTION, SOLUTION INTRAVENOUS at 03:26

## 2020-09-11 RX ADMIN — FAMOTIDINE 20 MG: 10 INJECTION INTRAVENOUS at 09:36

## 2020-09-11 RX ADMIN — SODIUM CHLORIDE 300 MG: 9 INJECTION, SOLUTION INTRAVENOUS at 09:36

## 2020-09-11 RX ADMIN — CEFEPIME HYDROCHLORIDE 1 G: 1 INJECTION, POWDER, FOR SOLUTION INTRAMUSCULAR; INTRAVENOUS at 15:30

## 2020-09-11 RX ADMIN — METOCLOPRAMIDE 10 MG: 5 INJECTION, SOLUTION INTRAMUSCULAR; INTRAVENOUS at 03:14

## 2020-09-11 RX ADMIN — Medication 10 ML: at 09:36

## 2020-09-11 ASSESSMENT — PAIN SCALES - GENERAL
PAINLEVEL_OUTOF10: 5
PAINLEVEL_OUTOF10: 6

## 2020-09-11 NOTE — PROGRESS NOTES
F/u with pt and wife. Wife overwhelmed. Reviewed humalog ss, smbg, Lantus and use of pens. Meds to beds ordered vs using pt's [facility] pharmacy. Pt gave premeal insulin shot while writer in room. Reminder tips and meal planning info given with writer # to call for further questions.

## 2020-09-11 NOTE — PLAN OF CARE
Problem: Skin Integrity:  Goal: Will show no infection signs and symptoms  Description: Will show no infection signs and symptoms  Outcome: Met This Shift     Problem: Falls - Risk of:  Goal: Will remain free from falls  Description: Will remain free from falls  Outcome: Met This Shift  Note: Patient's bed remained locked and in lowest position throughout shift. Patient belonings and call light remain within reach. 2/4 side rails are up, and non-skid footwear is on. Problem: Serum Glucose Level - Abnormal:  Goal: Ability to maintain appropriate glucose levels will improve  Description: Ability to maintain appropriate glucose levels will improve  Outcome: Met This Shift     Problem: Nutrition  Goal: Optimal nutrition therapy  Description: Nutrition Problem #1: Inadequate oral intake  Intervention: Food and/or Nutrient Delivery: Continue NPO. Start nutrition as able; Monitor plans re:PICC placement/ initiation of parenteral nutrition support. Nutritional Goals: meet % of estimated nutrition needs     Outcome: Met This Shift     Problem: Pain:  Description: Pain management should include both nonpharmacologic and pharmacologic interventions. Goal: Pain level will decrease  Description: Pain level will decrease  Outcome: Ongoing  Note: Patient rated pain a  5 on a scale from 0-10. Writer administered scheduled and PRN pain medications to patient.        Electronically signed by Davis Florez RN on 9/11/2020 at 4:56 AM

## 2020-09-11 NOTE — PROGRESS NOTES
Comprehensive Nutrition Assessment    Type and Reason for Visit:  Reassess    Nutrition Recommendations/Plan:   -Recommend continuation of current diet with Glucerna ONS 3x/day  -Will continue to monitor labs, meds, weights, and I/O's. Nutrition Assessment:  Pt reports eating 75% of meal this morning and 100% of Glucerna ONS yesterday & this morning. Will continue to monitor. Estimated Daily Nutrient Needs:  Energy (kcal):  4176-8922 kcal/day; Weight Used for Energy Requirements:  Ideal     Protein (g):  1.3 - 1.5 = 100-120 g/day; Weight Used for Protein Requirements:  Ideal(1.2)          Nutrition Related Findings:  K 5.5, creatinine 1.48, glucose 133. Meds reviewed. Last BM:9/10. Meds reviewed. Wounds:  Surgical Wound       Current Nutrition Therapies:    Dietary Nutrition Supplements:  DIET LOW FIBER; Carb Control: 4 carb choices (60 gms)/meal    Anthropometric Measures:  · Height: 5' 11\" (180.3 cm)  · Current Body Weight: 275 lb 9.2 oz (125 kg)   · Admission Body Weight: 257 lb (116.6 kg)    · Usual Body Weight: 294 lb (133.4 kg)(3/16/20 per EHR)     · Ideal Body Weight: 172 lbs; % Ideal Body Weight 160.2 %   · BMI: 38.5  · BMI Categories: Obese Class 2 (BMI 35.0 -39.9)       Nutrition Diagnosis:   · Inadequate oral intake related to altered GI function as evidenced by intake 51-75%    Nutrition Interventions:   Food and/or Nutrient Delivery:  Continue Oral Nutrition Supplement, Continue Current Diet  Nutrition Education/Counseling:  No recommendation at this time   Coordination of Nutrition Care:  Continued Inpatient Monitoring    Goals: Goal Achieved  meet % of estimated nutrition needs       Nutrition Monitoring and Evaluation:   Behavioral-Environmental Outcomes:  (N/A)   Food/Nutrient Intake Outcomes:  Supplement Intake, Food and Nutrient Intake  Physical Signs/Symptoms Outcomes:  GI Status, Nutrition Focused Physical Findings, Biochemical Data     Discharge Planning:     Too soon to determine     Electronically signed by BLANK Agee on 9/11/20 at 11:11 AM EDT    Contact: [phone]

## 2020-09-11 NOTE — PROGRESS NOTES
CLINICAL PHARMACY NOTE: MEDS TO [address] Select Patient?: No  Total # of Prescriptions Filled: 9   The following medications were delivered to the patient:  · Glucometer  · Lancets  · Test stripes  · Pen needles  · novolog  · basaglar  · Pantoprazole  · Gabapentin  · Levofloxacin   Total # of Interventions Completed: 0  Time Spent (min): 5    Additional Documentation: meds delivered to patient 9/11 at 6:34pm

## 2020-09-11 NOTE — CARE COORDINATION
Discharge order noted. Call to [facility], spoke to Katharine Hays, notified of discharge home today. 1710 - Patient needing glucometer, test strips, lancets for discharge home. PS to on-call resident requesting orders be faxed to home pharmacy.      Discharge 1 [facility] Case Management Department  Written by: Cindy Hughes RN    Patient Name: Natalie German II  Attending Provider: Pasquale Cleary MD  Admit Date: 2020 11:04 PM  MRN: [id number]  Account: [de-identified]                     : 1960  Discharge Date:  20       Disposition: home with Lana Fontanez RN

## 2020-09-11 NOTE — FLOWSHEET NOTE
DATE: 2020    NAME: Maia Min II  MRN: [id number]   : 1960    Patient not seen this date for Physical Therapy due to:  [] Blood transfusion in progress  [] Hemodialysis  []  Patient Declined  [] Spine Precautions   [] Strict Bedrest  [] Surgery/ Procedure  [] Testing      [x] Other: per RN pt discharging home soon and is Independent        [] PT being discontinued at this time. Patient independent. No further needs. [] PT being discontinued at this time as the patient has been transferred to palliative care. No further needs.     Vidya Louie, PTA

## 2020-09-11 NOTE — PROGRESS NOTES
Urology Progress Note  CC:  Left renal mass  Subjective:   Cierra Santos is a 56 y.o. male. His/Her current Diet is: Dietary Nutrition Supplements:  DIET LOW FIBER; Carb Control: 4 carb choices (60 gms)/meal.  The patient is 9 Days Post-Op from Procedure(s):  LAPAROSCOPIC XI ROBOTIC NEPHRECTOMY  EXPLORATORY LAPAROTOMY, RESECTION OF PROXIMAL JEJUNUM AND DESCENDING COLON WITH MOBILIZATION OF SPLENIC FLEXURE AND PRIMARY ANASTAMOSISOF SMALL BOWEL AND COLON, PLACEMENT OF GASTROSTOMY TUBE  No acute urologic events overnight. No chest pain, shortness of breath, nausea or vomiting, or Chills. No fevers. Tolerating diet. G tube clamped 9/9 and general surgery advance his diet to low fiber yesterday. Ambulating in hallway. Had BM and passing flatus.   Pain well controlled  Gastrostomy tube is clamped   cc in 24 hr plus unrecorded    Patient Vitals for the past 24 hrs:   BP Temp Temp src Pulse Resp SpO2 Height   09/11/20 0311 (!) 141/100 98.6 °F (37 °C) Oral 108 19 95 % --   09/10/20 2311 (!) 152/97 98.5 °F (36.9 °C) Oral 110 20 -- --   09/10/20 2045 (!) 158/98 98.6 °F (37 °C) Oral 111 12 97 % --   09/10/20 1645 (!) 138/98 98.6 °F (37 °C) Oral 109 17 95 % --   09/10/20 1600 (!) 139/98 98.6 °F (37 °C) Oral 109 17 96 % --   09/10/20 1100 (!) 151/94 98.5 °F (36.9 °C) Oral 115 18 97 % --   09/10/20 1050 -- -- -- -- -- -- 5' 11\" (1.803 m)   09/10/20 0814 (!) 137/92 98.4 °F (36.9 °C) Oral 85 14 -- --       Intake/Output Summary (Last 24 hours) at 9/11/2020 0620  Last data filed at 9/10/2020 1810  Gross per 24 hour   Intake 950 ml   Output 650 ml   Net 300 ml       Recent Labs     09/09/20  0510 09/10/20  0838 09/11/20  0414   WBC 17.1* 17.8* 18.7*   HGB 9.6* 10.4* 10.0*   HCT 30.9* 34.7* 32.3*   MCV 87.3 91.1 88.5    443 447     Recent Labs     09/09/20  0510 09/10/20  0838 09/11/20  0414    136 135   K 4.3 4.8 5.5*    104 103   CO2 24 22 22   PHOS 2.9 3.2 3.1   BUN 26* 24* 23   CREATININE 1.44* 1.41* 1.48*       No results for input(s): COLORU, PHUR, LABCAST, WBCUA, RBCUA, MUCUS, TRICHOMONAS, YEAST, BACTERIA, CLARITYU, SPECGRAV, LEUKOCYTESUR, UROBILINOGEN, Manuelita Griggs in the last 72 hours.     Invalid input(s): NITRATE, GLUCOSEUKETONESUAMORPHOUS    Current Facility-Administered Medications   Medication Dose Route Frequency Provider Last Rate Last Dose    insulin glargine (LANTUS) injection vial 60 Units  60 Units Subcutaneous Nightly Fabiola Chu MD   60 Units at 09/10/20 2038    metoprolol tartrate (LOPRESSOR) tablet 50 mg  50 mg Oral BID Fabiola Chu MD   50 mg at 09/10/20 2311    metoclopramide (REGLAN) injection 10 mg  10 mg Intravenous Q6H Tino Roche, DO   10 mg at 09/11/20 0314    iron sucrose (VENOFER) 300 mg in sodium chloride 0.9 % 250 mL IVPB  300 mg Intravenous Q24H Fabiola Chu MD   Stopped at 09/10/20 1117    albuterol (PROVENTIL) nebulizer solution 2.5 mg  2.5 mg Nebulization Q6H PRN Juan Pablo Chino MD        glucose (GLUTOSE) 40 % oral gel 15 g  15 g Oral PRN Fátima Berman MD        dextrose 50 % IV solution  12.5 g Intravenous PRN Fátima Berman MD        glucagon (rDNA) injection 1 mg  1 mg Intramuscular PRN Fátima Berman MD        dextrose 5 % solution  100 mL/hr Intravenous PRN Fátima Berman MD        HYDROmorphone (DILAUDID) injection 0.5 mg  0.5 mg Intravenous Q6H PRN Tino Roche, DO   0.5 mg at 09/10/20 2338    insulin lispro (HUMALOG) injection vial 0-18 Units  0-18 Units Subcutaneous Q6H Tomie Booze, DO   9 Units at 09/10/20 1353    insulin lispro (HUMALOG) injection vial 0-9 Units  0-9 Units Subcutaneous Nightly Tomie Booze, DO        acetaminophen (TYLENOL) tablet 1,000 mg  1,000 mg Oral Q8H Tino Roche, DO   1,000 mg at 09/10/20 2311    methocarbamol (ROBAXIN) tablet 750 mg  750 mg Oral Q6H Tino Roche, DO   750 mg at 09/10/20 2311    gabapentin (NEURONTIN) capsule 300 mg  300 mg Oral Q8H Tino Roche, DO   300 mg at 09/10/20 2312  oxyCODONE (ROXICODONE) immediate release tablet 5 mg  5 mg Oral Q4H PRN Holger Mcmahan DO   5 mg at 09/11/20 0233    0.9 % sodium chloride bolus  20 mL Intravenous Once Derik Santos MD   Stopped at 09/04/20 0836    metoprolol (LOPRESSOR) injection 5 mg  5 mg Intravenous Q4H Matthew Hyatt MD   5 mg at 09/11/20 0326    metoprolol (LOPRESSOR) injection 5 mg  5 mg Intravenous Q6H PRN Matthew Hyatt MD   5 mg at 09/09/20 1558    famotidine (PEPCID) injection 20 mg  20 mg Intravenous Daily Matthew Hyatt MD   20 mg at 09/10/20 0820    cefepime (MAXIPIME) 1 g IVPB minibag  1 g Intravenous Q12H Lynn Enriquez MD   Stopped at 09/11/20 0344    sodium chloride flush 0.9 % injection 10 mL  10 mL Intravenous 2 times per day Carmela Webber MD   10 mL at 09/10/20 2048    sodium chloride flush 0.9 % injection 10 mL  10 mL Intravenous PRN Carmela Webber MD        sodium chloride flush 0.9 % injection 10 mL  10 mL Intravenous 2 times per day Carmela Webber MD   10 mL at 09/10/20 2039    sodium chloride flush 0.9 % injection 10 mL  10 mL Intravenous PRN Carmela Webber MD        heparin (porcine) injection 5,000 Units  5,000 Units Subcutaneous 3 times per day Carmela Webber MD   5,000 Units at 09/10/20 2312    metronidazole (FLAGYL) 500 mg in NaCl 100 mL IVPB premix  500 mg Intravenous Rogelio Pugh MD   Stopped at 09/11/20 0414    sodium chloride flush 0.9 % injection 10 mL  10 mL Intravenous 2 times per day Carmela Webber MD   10 mL at 09/10/20 2049    sodium chloride flush 0.9 % injection 10 mL  10 mL Intravenous PRN Carmela Webber MD        ondansetron (ZOFRAN) injection 4 mg  4 mg Intravenous Q6H PRN Carmela Webber MD                Additional Lab/culture results:  BCx 8/31 ng x 6 days   BCx 9/2 ng x 6 days   UCx no growth from 9/2  UCx no growth from 9/1  UCx no growth from 8/31    Physical Exam:   NAD  AOx3  Peripheral pulses palpable  Respirations nonlabored, symmetric chest

## 2020-09-11 NOTE — PROGRESS NOTES
General Surgery:  Daily Progress Note          POD 10  s/p SB resection with primary anastomosis, left partial colectomy with primary anastomosis          PATIENT NAME: Jackelyn Barnes II     TODAY'S DATE: 9/11/2020, 6:03 AM  CC:  SBO     SUBJECTIVE:     Pt seen and examined at bedside. Nol events over night. Pt slept well with CPAP. Pt tolerated diet of low fiber and carb control. Pt is off insulin gtt BG has is in range <180. Pt has BM and passing gas. He is afebrile, Up oob, ambulating. Pain controlled. OBJECTIVE:   VITALS:  BP (!) 141/100   Pulse 108   Temp 98.6 °F (37 °C) (Oral)   Resp 19   Ht 5' 11\" (1.803 m)   Wt 275 lb 9.2 oz (125 kg)   SpO2 95%   BMI 38.43 kg/m²      INTAKE/OUTPUT:      Intake/Output Summary (Last 24 hours) at 9/11/2020 0603  Last data filed at 9/10/2020 1810  Gross per 24 hour   Intake 2295 ml   Output 2400 ml   Net -105 ml       PHYSICAL EXAM:  General Appearance: awake, alert, oriented, in no acute distress  HEENT:  Normocephalic, atraumatic, mucus membranes moist   Heart: Heart sounds are normal.  Regular rate and rhythm without murmur, gallop or rub. Lungs: Normal expansion. Clear to auscultation. No rales, rhonchi, or wheezing. Abdomen: soft, nd, attp  Incision: Staples intact and no sign of infection   Extremities: No cyanosis, pitting edema, rashes noted. Skin: Skin color, texture, turgor normal. No rashes or lesions.       Data:  CBC with Differential:    Lab Results   Component Value Date    WBC 18.7 09/11/2020    RBC 3.65 09/11/2020    HGB 10.0 09/11/2020    HCT 32.3 09/11/2020     09/11/2020    MCV 88.5 09/11/2020    MCH 27.4 09/11/2020    MCHC 31.0 09/11/2020    RDW 14.3 09/11/2020    LYMPHOPCT 10 09/11/2020    MONOPCT 10 09/11/2020    BASOPCT 0 09/11/2020    MONOSABS 1.87 09/11/2020    LYMPHSABS 1.87 09/11/2020    EOSABS 1.12 09/11/2020    BASOSABS 0.00 09/11/2020    DIFFTYPE NOT REPORTED 09/11/2020     CMP:    Lab Results   Component Value Date     09/11/2020    K 5.5 09/11/2020     09/11/2020    CO2 22 09/11/2020    BUN 23 09/11/2020    CREATININE 1.48 09/11/2020    GFRAA 58 09/11/2020    LABGLOM 48 09/11/2020    GLUCOSE 133 09/11/2020    PROT 5.9 09/05/2020    LABALBU 2.0 09/05/2020    CALCIUM 8.5 09/11/2020    BILITOT 0.55 09/05/2020    ALKPHOS 62 09/05/2020    AST 14 09/05/2020    ALT 12 09/05/2020       Radiology Review:     Xr Abdomen (kub) (single Ap View)     Addendum Date: 9/1/2020    ADDENDUM: Dose area product 1037.1 mGy per cm squared and fluoro time 5.6 seconds      Result Date: 9/1/2020  EXAMINATION: ONE SUPINE XRAY VIEW(S) OF THE ABDOMEN 9/1/2020 9:32 pm COMPARISON: 16 hours ago HISTORY: ORDERING SYSTEM PROVIDED HISTORY: cysto with right stent TECHNOLOGIST PROVIDED HISTORY: cysto with right stent Reason for Exam: cysto Acuity: Acute Type of Exam: Initial FINDINGS: Double-J ureteral stent present.  On the contralateral side there appears to be a nephrolith.      Please correlate with clinical service      Xr Chest Portable     Result Date: 9/1/2020  EXAMINATION: ONE XRAY VIEW OF THE CHEST 9/1/2020 8:31 pm COMPARISON: 05/14/2010 HISTORY: ORDERING SYSTEM PROVIDED HISTORY: evaluation TECHNOLOGIST PROVIDED HISTORY: evaluation Reason for Exam: evaluation Acuity: Acute Type of Exam: Initial FINDINGS: Cardiomediastinal silhouette is unchanged in size.  No pulmonary consolidation, pleural effusion, or pneumothorax.  No acute osseous abnormality.  Enteric tube is below the diaphragm with tip outside the field of view.      No acute cardiopulmonary abnormality.      Xr Abdomen For Ng/og/ne Tube Placement     Result Date: 9/1/2020  EXAMINATION: ONE SUPINE XRAY VIEW(S) OF THE ABDOMEN 9/1/2020 6:26 am COMPARISON: 08/31/2020 HISTORY: ORDERING SYSTEM PROVIDED HISTORY: Confirmation of course of NG/OG/NE tube and location of tip of tube TECHNOLOGIST PROVIDED HISTORY: Confirmation of course of NG/OG/NE tube and location of tip of tube

## 2020-09-11 NOTE — PROGRESS NOTES
lesions or scars.   No cyanosis or clubbing  Neurological/Psychiatric: The patient's general behavior, level of consciousness, thought content and emotional status is normal.        Medications:  Scheduled Medications:    insulin glargine  60 Units Subcutaneous Nightly    metoprolol tartrate  50 mg Oral BID    metoclopramide  10 mg Intravenous Q6H    iron sucrose  300 mg Intravenous Q24H    insulin lispro  0-18 Units Subcutaneous Q6H    insulin lispro  0-9 Units Subcutaneous Nightly    acetaminophen  1,000 mg Oral Q8H    methocarbamol  750 mg Oral Q6H    gabapentin  300 mg Oral Q8H    sodium chloride  20 mL Intravenous Once    metoprolol  5 mg Intravenous Q4H    famotidine (PEPCID) injection  20 mg Intravenous Daily    cefepime  1 g Intravenous Q12H    sodium chloride flush  10 mL Intravenous 2 times per day    sodium chloride flush  10 mL Intravenous 2 times per day    heparin (porcine)  5,000 Units Subcutaneous 3 times per day    metroNIDAZOLE  500 mg Intravenous Q8H    sodium chloride flush  10 mL Intravenous 2 times per day     Continuous Infusions:    dextrose       PRN MedicationsoxyCODONE, 5 mg, Q4H PRN    Or  oxyCODONE, 10 mg, Q4H PRN  albuterol, 2.5 mg, Q6H PRN  glucose, 15 g, PRN  dextrose, 12.5 g, PRN  glucagon (rDNA), 1 mg, PRN  dextrose, 100 mL/hr, PRN  metoprolol, 5 mg, Q6H PRN  sodium chloride flush, 10 mL, PRN  sodium chloride flush, 10 mL, PRN  sodium chloride flush, 10 mL, PRN  ondansetron, 4 mg, Q6H PRN        Diagnostic Labs:  CBC:   Recent Labs     09/09/20  0510 09/10/20  0838 09/11/20 0414   WBC 17.1* 17.8* 18.7*   RBC 3.54* 3.81* 3.65*   HGB 9.6* 10.4* 10.0*   HCT 30.9* 34.7* 32.3*   MCV 87.3 91.1 88.5   RDW 14.1 14.3 14.3    443 447     BMP:   Recent Labs     09/09/20  0510 09/10/20  0838 09/11/20 0414    136 135   K 4.3 4.8 5.5*    104 103   CO2 24 22 22   PHOS 2.9 3.2 3.1   BUN 26* 24* 23   CREATININE 1.44* 1.41* 1.48*     BNP: No results for input(s): BNP in the last 72 hours. PT/INR: No results for input(s): PROTIME, INR in the last 72 hours. APTT: No results for input(s): APTT in the last 72 hours. CARDIAC ENZYMES: No results for input(s): CKMB, CKMBINDEX, TROPONINI in the last 72 hours. Invalid input(s): CKTOTAL;3  FASTING LIPID PANEL:  Lab Results   Component Value Date    CHOL 131 06/22/2020    HDL 31 (L) 06/22/2020    TRIG 115 09/05/2020     LIVER PROFILE: No results for input(s): AST, ALT, ALB, BILIDIR, BILITOT, ALKPHOS in the last 72 hours. MICROBIOLOGY:   Lab Results   Component Value Date/Time    CULTURE NO GROWTH 6 DAYS 09/02/2020 04:49 AM       Imaging:    Ct Abdomen Pelvis Wo Contrast Additional Contrast? Oral And Rectal    Result Date: 9/7/2020  1. Interval left nephrectomy and small bowel and descending colon resection with primary anastomosis. No extravasation of administered oral contrast from the bowel to indicate bowel anastomotic leakage. 2. Small volume free intraperitoneal fluid, extensive left peritoneal reticulations and stranding and scattered pockets of free intraperitoneal air consistent with recent laparotomy. 3. Large filling defects in the sigmoid colon downstream from the colonic anastomosis probably combination of stool and blood products 4. Percutaneous gastrostomy tube in place. 5. Small left pleural effusion and subsegmental atelectasis. Trace right pleural effusion and subsegmental atelectasis. Xr Abdomen (2 Views)    Result Date: 9/7/2020  Normal bowel gas pattern. ASSESSMENT & PLAN     ASSESSMENT / PLAN:     Principal Problem:   Small bowel obstruction (HCC)  Plan: SB resection with primary anastomosis, left partial colectomy with primary anastomosis, open gastrectomy tube. Patient is eating orally and have normal bowel movements.   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 Urology on board.      Septicemia:  Continue cefepime and metronidazole.   Negative blood cultures and urine cultures but urine have leukocytes. WBCs today are 18.7. Discharge patient home on oral antibiotics.      Diabetes Mellitus on Insulin:  On lantus 60 units and high dose sliding scale. Blood glucose is 133.     Hypertension:  Patient currently on metoprolol injections. Was on Metoprolol 50 mg and lisinopril 20 mg(on held). 151/99     Blood loss anemia  She had acute blood loss anemia after the surgery. Hemoglobin stable around 10.0 and today hemoglobin is 10.0     Acute kidney injury:  Patient had acute kidney injury with creatinine was 1.41 and today it is 1.48. Monitor BUN and creatinine daily.     Superficial Thrombophlebitis:  Cephalic vein thrombophlebitis. Warm compresses. Elevate the arm. Hyperkalemia:  K is 5.5 today. Seems to be hemolysis. Needs repeat BMP and nebs done.     DVT ppx : heparin   GI ppx:pepsid      PT/OT: ongoing  Discharge Planning / Kandace Alford MD  Internal Medicine Resident, PGY-1  [facility];  [place], [place]  9/11/2020, 6:45 AM

## 2020-09-13 LAB
EKG ATRIAL RATE: 101 BPM
EKG P AXIS: 33 DEGREES
EKG P-R INTERVAL: 148 MS
EKG Q-T INTERVAL: 358 MS
EKG QRS DURATION: 78 MS
EKG QTC CALCULATION (BAZETT): 464 MS
EKG R AXIS: 2 DEGREES
EKG T AXIS: 10 DEGREES
EKG VENTRICULAR RATE: 101 BPM

## 2020-09-13 PROCEDURE — 93010 ELECTROCARDIOGRAM REPORT: CPT | Performed by: INTERNAL MEDICINE

## 2020-09-13 NOTE — DISCHARGE SUMMARY
[address]     Department of Internal Medicine - Staff Internal Medicine Teaching Service    INPATIENT DISCHARGE SUMMARY      Patient Identification:  Marlena Shi II is a 56 y.o. male. :  1960  MRN: [id number]     Acct: [de-identified]   PCP: Meche Cnonors MD  Admit Date:  2020  Discharge date and time: 2020  7:01 PM   Attending Provider: No att. providers found                                     [address] Problem Lists:  Principal Problem:    Small bowel obstruction ([address]  Active Problems:    Type 2 diabetes mellitus without complication ([address]    Severe malnutrition ([facility]    Hyperglycemia    Renal cell cancer ([facility]  Resolved Problems:    * No resolved hospital problems. *      HOSPITAL STAY     Brief Inpatient course:   Marlena Shi II is a 56 y.o. male who was admitted for the management of Small bowel obstruction [facility]), Patient originally presented to [facility] on  with symptoms of abdominal distention, abdominal pain, nausea emesis and inability to tolerate p.o. diet found to be severely dehydrated with acute kidney injury on CKD, elevated blood glucose in 400, leukocytosis 20,000 and tachycardic. CT abdomen pelvis was completed which demonstrated left renal mass 4.8 x 6.9 x 9.9 cm eroding into and causing small bowel obstruction.    On the CT abdomen patient was also noted to have a right ureteral stone 5 x 11 mm.   Patient was admitted to the ICU and general surgery was consulted along with urology at [address] was decompressed with NG tube, placed on IV fluids and insulin drip.  Pt was also started on cefepime and flagyl for empiric sepsis coverage. Prior to transfer to [facility] this morning patient received cystoscopy and right ureteral stent.   During ICU stay patient underwent laparoscopic robotic nephrectomy and   Exploratory laparotomy bowel resection of proximal jejunum and descending colon with mobilization of splenic flexure and primary anastomosis of small bowel and colon along with placement of gastrostomy tube. Patient started eating orally and his blood sugars were controlled. Patient given further 4 days of levofloxacin. Procedures/ Significant Interventions:    Robotic left nephrectomy  laparotomy with bowel resection    Consults:     Consults:     Final Specialist Recommendations/Findings:   IP CONSULT TO CASE MANAGEMENT  PHARMACY TO DOSE VANCOMYCIN  IP CONSULT TO UROLOGY  IP CONSULT TO DIETITIAN  IP CONSULT TO DIETITIAN  IP CONSULT TO DIETITIAN  IP CONSULT TO DIABETES EDUCATOR  IP CONSULT TO HOME CARE NEEDS      Any Hospital Acquired Infections: none    Discharge Functional Status:  stable    DISCHARGE PLAN     Disposition: home    Patient Instructions:   Discharge Medication List as of 9/11/2020  5:00 PM      START taking these medications    Details   gabapentin (NEURONTIN) 300 MG capsule Take 1 capsule by mouth 3 times daily for 7 days. , Disp-21 capsule,R-0Print      insulin glargine (LANTUS SOLOSTAR) 100 UNIT/ML injection pen Inject 60 Units into the skin nightly, Disp-5 pen,R-1Print      insulin lispro, 1 Unit Dial, (HUMALOG KWIKPEN) 100 UNIT/ML SOPN Inject 0-6 Units into the skin 3 times daily (before meals) **Corrective Low Dose Algorithm**  Glucose: Dose:               No Insulin  140-199 1 Unit  200-249 2 Units  250-299 3 Units  300-349 4 Units  350-399 5 Units  Over 399 6 Units, Disp-5 pen ,R-0Print      Insulin Pen Needle ([employer] PEN NEEDLES) 31G X 6 MM MISC DAILY Starting Fri 9/11/2020, Disp-100 each,R-3, Normal      Lancets MISC 3 TIMES DAILY Starting Fri 9/11/2020, Disp-200 each,R-0, Normal      blood glucose monitor strips Test 4 times a day & as needed for symptoms of irregular blood glucose. Dispense sufficient amount for indicated testing frequency plus additional to accommodate PRN testing needs. , Disp-120 strip,R-0, Print      Alcohol Swabs PADS 4 TIMES DAILY Starting Fri 9/11/2020, Disp-120 each,R-0, Normal         CONTINUE these medications which have CHANGED    Details   levoFLOXacin (LEVAQUIN) 750 MG tablet Take 1 tablet by mouth daily for 4 days, Disp-4 tablet,R-0Normal      ferrous sulfate (IRON 325) 325 (65 Fe) MG tablet Take 1 tablet by mouth daily (with breakfast), Disp-60 tablet,R-0Normal      metroNIDAZOLE (FLAGYL) 500 MG tablet Take 1 tablet by mouth 3 times daily for 4 days, Disp-12 tablet,R-0Normal      pantoprazole (PROTONIX) 40 MG tablet Take 1 tablet by mouth daily, Disp-60 tablet,R-0Normal         CONTINUE these medications which have NOT CHANGED    Details   traMADol (ULTRAM) 50 MG tablet Take 100 mg by mouth every 6 hours as needed for Pain. Historical Med      Melatonin 10 MG TABS Take 10 mg by mouth nightly as needed (SLEEP)Historical Med      vitamin C (ASCORBIC ACID) 500 MG tablet Take 500 mg by mouth dailyHistorical Med      acetaminophen (TYLENOL) 500 MG tablet Take 2 tablets by mouth every 6 hours as needed for Pain, Disp-40 tablet,R-0Print      cyclobenzaprine (FLEXERIL) 10 MG tablet Take 1 tablet by mouth 3 times daily as needed for Muscle spasms, Disp-30 tablet,R-0Print      aspirin 81 MG tablet Take 81 mg by mouth nightly Historical Med      pravastatin (PRAVACHOL) 20 MG tablet Take 20 mg by mouth daily      metoprolol (LOPRESSOR) 50 MG tablet Take 50 mg by mouth 2 times daily      allopurinol (ZYLOPRIM) 100 MG tablet Take 100 mg by mouth daily      zolpidem (AMBIEN) 10 MG tablet Take by mouth nightly as needed for Sleep      hydrALAZINE (APRESOLINE) 25 MG tablet Take 25 mg by mouth 3 times daily Historical Med         STOP taking these medications       lidocaine 4 % external patch Comments:   Reason for Stopping:         SITagliptin (JANUVIA) 50 MG tablet Comments:   Reason for Stopping:         lisinopril (PRINIVIL;ZESTRIL) 20 MG tablet Comments:   Reason for Stopping:         furosemide (LASIX) 40 MG tablet Comments:   Reason for Stopping:               Activity: activity as tolerated    Diet: cardiac diet and diabetic diet    Follow-up:    Steve Barnes [address]  [address] [id number] [phone]    Schedule an appointment as soon as possible for a visit in 2 weeks  post hospitalization follow up for robotic nephrectomy and bowel resection, uncontrolled type 2 DM, Hypertension. Iliana Dawson MD  [address], [place] [address]  [address]  [phone]    In 2 weeks  Acute blood anemia. ? EGD/Colonoscopy    Logan Alvarado MD  [address]  [address]  [phone]    Schedule an appointment as soon as possible for a visit in 2 weeks  post hospitalization follow up for robotic nephrectomy and bowel resection. Ang Longoria DO  [address] [facility] [address] [phone]    Schedule an appointment as soon as possible for a visit in 2 weeks  post hospitalization follow up for robotic nephrectomy and bowel resection, uncontrolled type 2 DM, Hypertension    [address]  [phone]          Patient Instructions: lease start taking lantus 60 units nightly along with sliding scale insulin, follow up with PCP for adjustment of the same as per your blood sugar levels. Stop taking oral hypoglycemic agents. Please take levaquin and flagyl for 4 more days, then stop. Get a repeat BMP and CBC in 2-3 days. Please take metoprolol and hydralazine for BP, follow up with PCP for hypertension and resume taking lisinopril as tolerated by BP and review of BMP. Please follow up with General Surgery and Urology for post op follow up and removal of G tube. Return to ER in case of worsening of symptoms or new symptoms. Start taking oral iron supplementation along with Vitamin C, follow up with PCP for repeat CBC and follow up with GI for OP EGD and Colonoscopy. Follow up labs:  Get a repeat BMP and CBC in 2-3 days.   Follow up imaging:     Note that over 30 minutes was spent in preparing discharge papers, discussing discharge with patient, medication review, etc.      Johanny Wilks MD, MD  Internal Medicine Resident, PGY-1  [address] [place], [place]  9/13/2020, 4:22 PM

## 2020-09-16 ENCOUNTER — TELEPHONE (OUTPATIENT)
Dept: UROLOGY | Age: 60
End: 2020-09-16

## 2020-09-16 ENCOUNTER — HOSPITAL ENCOUNTER (OUTPATIENT)
Age: 60
Setting detail: OBSERVATION
Discharge: HOME | End: 2020-09-17
Attending: EMERGENCY MEDICINE | Admitting: FAMILY MEDICINE
Payer: COMMERCIAL

## 2020-09-16 ENCOUNTER — APPOINTMENT (OUTPATIENT)
Dept: ULTRASOUND IMAGING | Age: 60
End: 2020-09-16
Payer: COMMERCIAL

## 2020-09-16 LAB
ABSOLUTE EOS #: 0.55 K/UL (ref 0–0.44)
ABSOLUTE IMMATURE GRANULOCYTE: 0.5 K/UL (ref 0–0.3)
ABSOLUTE LYMPH #: 1.76 K/UL (ref 1.1–3.7)
ABSOLUTE MONO #: 1.3 K/UL (ref 0.1–1.2)
ANION GAP SERPL CALCULATED.3IONS-SCNC: 12 MMOL/L (ref 9–17)
BASOPHILS # BLD: 1 % (ref 0–2)
BASOPHILS ABSOLUTE: 0.12 K/UL (ref 0–0.2)
BUN BLDV-MCNC: 39 MG/DL (ref 8–23)
BUN/CREAT BLD: ABNORMAL (ref 9–20)
CALCIUM SERPL-MCNC: 9.2 MG/DL (ref 8.6–10.4)
CHLORIDE BLD-SCNC: 103 MMOL/L (ref 98–107)
CO2: 18 MMOL/L (ref 20–31)
CREAT SERPL-MCNC: 2.2 MG/DL (ref 0.7–1.2)
DIFFERENTIAL TYPE: ABNORMAL
EOSINOPHILS RELATIVE PERCENT: 4 % (ref 1–4)
GFR AFRICAN AMERICAN: 37 ML/MIN
GFR NON-AFRICAN AMERICAN: 30 ML/MIN
GFR SERPL CREATININE-BSD FRML MDRD: ABNORMAL ML/MIN/{1.73_M2}
GFR SERPL CREATININE-BSD FRML MDRD: ABNORMAL ML/MIN/{1.73_M2}
GLUCOSE BLD-MCNC: 70 MG/DL (ref 75–110)
GLUCOSE BLD-MCNC: 82 MG/DL (ref 70–99)
HCT VFR BLD CALC: 37.8 % (ref 40.7–50.3)
HEMOGLOBIN: 11.5 G/DL (ref 13–17)
IMMATURE GRANULOCYTES: 3 %
LYMPHOCYTES # BLD: 11 % (ref 24–43)
MAGNESIUM: 2 MG/DL (ref 1.6–2.6)
MCH RBC QN AUTO: 27.3 PG (ref 25.2–33.5)
MCHC RBC AUTO-ENTMCNC: 30.4 G/DL (ref 28.4–34.8)
MCV RBC AUTO: 89.8 FL (ref 82.6–102.9)
MONOCYTES # BLD: 8 % (ref 3–12)
NRBC AUTOMATED: 0 PER 100 WBC
PDW BLD-RTO: 14.9 % (ref 11.8–14.4)
PLATELET # BLD: 622 K/UL (ref 138–453)
PLATELET ESTIMATE: ABNORMAL
PMV BLD AUTO: 10.7 FL (ref 8.1–13.5)
POTASSIUM SERPL-SCNC: 5.3 MMOL/L (ref 3.7–5.3)
RBC # BLD: 4.21 M/UL (ref 4.21–5.77)
RBC # BLD: ABNORMAL 10*6/UL
SEG NEUTROPHILS: 73 % (ref 36–65)
SEGMENTED NEUTROPHILS ABSOLUTE COUNT: 11.68 K/UL (ref 1.5–8.1)
SODIUM BLD-SCNC: 133 MMOL/L (ref 135–144)
WBC # BLD: 15.9 K/UL (ref 3.5–11.3)
WBC # BLD: ABNORMAL 10*3/UL

## 2020-09-16 PROCEDURE — 80048 BASIC METABOLIC PNL TOTAL CA: CPT

## 2020-09-16 PROCEDURE — 6370000000 HC RX 637 (ALT 250 FOR IP): Performed by: STUDENT IN AN ORGANIZED HEALTH CARE EDUCATION/TRAINING PROGRAM

## 2020-09-16 PROCEDURE — 2580000003 HC RX 258: Performed by: STUDENT IN AN ORGANIZED HEALTH CARE EDUCATION/TRAINING PROGRAM

## 2020-09-16 PROCEDURE — 76770 US EXAM ABDO BACK WALL COMP: CPT

## 2020-09-16 PROCEDURE — 83735 ASSAY OF MAGNESIUM: CPT

## 2020-09-16 PROCEDURE — G0378 HOSPITAL OBSERVATION PER HR: HCPCS

## 2020-09-16 PROCEDURE — 6360000002 HC RX W HCPCS: Performed by: STUDENT IN AN ORGANIZED HEALTH CARE EDUCATION/TRAINING PROGRAM

## 2020-09-16 PROCEDURE — 96372 THER/PROPH/DIAG INJ SC/IM: CPT

## 2020-09-16 PROCEDURE — 85025 COMPLETE CBC W/AUTO DIFF WBC: CPT

## 2020-09-16 PROCEDURE — 82947 ASSAY GLUCOSE BLOOD QUANT: CPT

## 2020-09-16 PROCEDURE — 99284 EMERGENCY DEPT VISIT MOD MDM: CPT

## 2020-09-16 RX ORDER — ACETAMINOPHEN 325 MG/1
650 TABLET ORAL EVERY 6 HOURS PRN
Status: DISCONTINUED | OUTPATIENT
Start: 2020-09-16 | End: 2020-09-18 | Stop reason: HOSPADM

## 2020-09-16 RX ORDER — DEXTROSE MONOHYDRATE 50 MG/ML
100 INJECTION, SOLUTION INTRAVENOUS PRN
Status: DISCONTINUED | OUTPATIENT
Start: 2020-09-16 | End: 2020-09-18 | Stop reason: HOSPADM

## 2020-09-16 RX ORDER — ONDANSETRON 2 MG/ML
4 INJECTION INTRAMUSCULAR; INTRAVENOUS EVERY 8 HOURS PRN
Status: DISCONTINUED | OUTPATIENT
Start: 2020-09-16 | End: 2020-09-18 | Stop reason: HOSPADM

## 2020-09-16 RX ORDER — PROMETHAZINE HYDROCHLORIDE 12.5 MG/1
12.5 TABLET ORAL EVERY 6 HOURS PRN
Status: DISCONTINUED | OUTPATIENT
Start: 2020-09-16 | End: 2020-09-18 | Stop reason: HOSPADM

## 2020-09-16 RX ORDER — POLYETHYLENE GLYCOL 3350 17 G/17G
17 POWDER, FOR SOLUTION ORAL DAILY PRN
Status: DISCONTINUED | OUTPATIENT
Start: 2020-09-16 | End: 2020-09-18 | Stop reason: HOSPADM

## 2020-09-16 RX ORDER — ONDANSETRON 2 MG/ML
4 INJECTION INTRAMUSCULAR; INTRAVENOUS EVERY 6 HOURS PRN
Status: DISCONTINUED | OUTPATIENT
Start: 2020-09-16 | End: 2020-09-18 | Stop reason: HOSPADM

## 2020-09-16 RX ORDER — HYDRALAZINE HYDROCHLORIDE 50 MG/1
25 TABLET, FILM COATED ORAL 3 TIMES DAILY
Status: DISCONTINUED | OUTPATIENT
Start: 2020-09-16 | End: 2020-09-18 | Stop reason: HOSPADM

## 2020-09-16 RX ORDER — NICOTINE POLACRILEX 4 MG
15 LOZENGE BUCCAL PRN
Status: DISCONTINUED | OUTPATIENT
Start: 2020-09-16 | End: 2020-09-18 | Stop reason: HOSPADM

## 2020-09-16 RX ORDER — 0.9 % SODIUM CHLORIDE 0.9 %
1000 INTRAVENOUS SOLUTION INTRAVENOUS ONCE
Status: DISCONTINUED | OUTPATIENT
Start: 2020-09-16 | End: 2020-09-16

## 2020-09-16 RX ORDER — PRAVASTATIN SODIUM 20 MG
20 TABLET ORAL DAILY
Status: DISCONTINUED | OUTPATIENT
Start: 2020-09-16 | End: 2020-09-18 | Stop reason: HOSPADM

## 2020-09-16 RX ORDER — UREA 10 %
10 LOTION (ML) TOPICAL NIGHTLY PRN
Status: DISCONTINUED | OUTPATIENT
Start: 2020-09-16 | End: 2020-09-18 | Stop reason: HOSPADM

## 2020-09-16 RX ORDER — SODIUM CHLORIDE 0.9 % (FLUSH) 0.9 %
10 SYRINGE (ML) INJECTION EVERY 12 HOURS SCHEDULED
Status: CANCELLED | OUTPATIENT
Start: 2020-09-16

## 2020-09-16 RX ORDER — PANTOPRAZOLE SODIUM 40 MG/1
40 TABLET, DELAYED RELEASE ORAL DAILY
Status: DISCONTINUED | OUTPATIENT
Start: 2020-09-16 | End: 2020-09-18 | Stop reason: HOSPADM

## 2020-09-16 RX ORDER — SODIUM CHLORIDE 0.9 % (FLUSH) 0.9 %
10 SYRINGE (ML) INJECTION PRN
Status: CANCELLED | OUTPATIENT
Start: 2020-09-16

## 2020-09-16 RX ORDER — ACETAMINOPHEN 500 MG
1000 TABLET ORAL EVERY 6 HOURS PRN
Status: DISCONTINUED | OUTPATIENT
Start: 2020-09-16 | End: 2020-09-18 | Stop reason: HOSPADM

## 2020-09-16 RX ORDER — LANOLIN ALCOHOL/MO/W.PET/CERES
325 CREAM (GRAM) TOPICAL
Status: DISCONTINUED | OUTPATIENT
Start: 2020-09-17 | End: 2020-09-18 | Stop reason: HOSPADM

## 2020-09-16 RX ORDER — ASPIRIN 81 MG/1
81 TABLET, CHEWABLE ORAL NIGHTLY
Status: DISCONTINUED | OUTPATIENT
Start: 2020-09-16 | End: 2020-09-18 | Stop reason: HOSPADM

## 2020-09-16 RX ORDER — METOPROLOL TARTRATE 50 MG/1
50 TABLET, FILM COATED ORAL 2 TIMES DAILY
Status: DISCONTINUED | OUTPATIENT
Start: 2020-09-16 | End: 2020-09-18 | Stop reason: HOSPADM

## 2020-09-16 RX ORDER — DEXTROSE MONOHYDRATE 25 G/50ML
12.5 INJECTION, SOLUTION INTRAVENOUS PRN
Status: DISCONTINUED | OUTPATIENT
Start: 2020-09-16 | End: 2020-09-18 | Stop reason: HOSPADM

## 2020-09-16 RX ORDER — HEPARIN SODIUM 5000 [USP'U]/ML
5000 INJECTION, SOLUTION INTRAVENOUS; SUBCUTANEOUS EVERY 8 HOURS SCHEDULED
Status: DISCONTINUED | OUTPATIENT
Start: 2020-09-16 | End: 2020-09-18 | Stop reason: HOSPADM

## 2020-09-16 RX ORDER — GABAPENTIN 300 MG/1
300 CAPSULE ORAL 3 TIMES DAILY
Status: DISCONTINUED | OUTPATIENT
Start: 2020-09-16 | End: 2020-09-18 | Stop reason: HOSPADM

## 2020-09-16 RX ORDER — 0.9 % SODIUM CHLORIDE 0.9 %
1000 INTRAVENOUS SOLUTION INTRAVENOUS ONCE
Status: COMPLETED | OUTPATIENT
Start: 2020-09-16 | End: 2020-09-16

## 2020-09-16 RX ORDER — ZOLPIDEM TARTRATE 5 MG/1
5 TABLET ORAL NIGHTLY PRN
Status: DISCONTINUED | OUTPATIENT
Start: 2020-09-16 | End: 2020-09-18 | Stop reason: HOSPADM

## 2020-09-16 RX ORDER — ACETAMINOPHEN 650 MG/1
650 SUPPOSITORY RECTAL EVERY 6 HOURS PRN
Status: DISCONTINUED | OUTPATIENT
Start: 2020-09-16 | End: 2020-09-18 | Stop reason: HOSPADM

## 2020-09-16 RX ORDER — CYCLOBENZAPRINE HCL 10 MG
10 TABLET ORAL 2 TIMES DAILY PRN
Status: DISCONTINUED | OUTPATIENT
Start: 2020-09-16 | End: 2020-09-18 | Stop reason: HOSPADM

## 2020-09-16 RX ADMIN — HEPARIN SODIUM 5000 UNITS: 5000 INJECTION INTRAVENOUS; SUBCUTANEOUS at 21:46

## 2020-09-16 RX ADMIN — GABAPENTIN 300 MG: 300 CAPSULE ORAL at 17:38

## 2020-09-16 RX ADMIN — GABAPENTIN 300 MG: 300 CAPSULE ORAL at 21:46

## 2020-09-16 RX ADMIN — SODIUM CHLORIDE: 9 INJECTION, SOLUTION INTRAVENOUS at 17:38

## 2020-09-16 RX ADMIN — ASPIRIN 81 MG: 81 TABLET, CHEWABLE ORAL at 21:47

## 2020-09-16 RX ADMIN — HEPARIN SODIUM 5000 UNITS: 5000 INJECTION INTRAVENOUS; SUBCUTANEOUS at 17:38

## 2020-09-16 RX ADMIN — SODIUM CHLORIDE 1000 ML: 9 INJECTION, SOLUTION INTRAVENOUS at 13:17

## 2020-09-16 RX ADMIN — ACETAMINOPHEN 1000 MG: 500 TABLET ORAL at 20:26

## 2020-09-16 RX ADMIN — PRAVASTATIN SODIUM 20 MG: 20 TABLET ORAL at 20:27

## 2020-09-16 ASSESSMENT — PAIN DESCRIPTION - ORIENTATION: ORIENTATION: RIGHT;LEFT

## 2020-09-16 ASSESSMENT — PAIN DESCRIPTION - PAIN TYPE: TYPE: ACUTE PAIN

## 2020-09-16 ASSESSMENT — ENCOUNTER SYMPTOMS
BACK PAIN: 0
VOMITING: 0
ABDOMINAL PAIN: 0
NAUSEA: 0
SORE THROAT: 0
SHORTNESS OF BREATH: 0

## 2020-09-16 ASSESSMENT — PAIN DESCRIPTION - DESCRIPTORS: DESCRIPTORS: CONSTANT

## 2020-09-16 ASSESSMENT — PAIN DESCRIPTION - LOCATION: LOCATION: ABDOMEN

## 2020-09-16 ASSESSMENT — PAIN SCALES - GENERAL
PAINLEVEL_OUTOF10: 7
PAINLEVEL_OUTOF10: 7

## 2020-09-16 NOTE — ED NOTES
Pt to ultrasound via stretcher. Pt's wife in room provided with boxed lunch.      Anna Marie Esteves RN  09/16/20 [id number]

## 2020-09-16 NOTE — CARE COORDINATION
Case Management Initial Discharge Plan  Jackelyn Barnes II,             Met with:patient or spouse/SO to discuss discharge plans. Information verified: address, contacts, phone number, , insurance Yes    Emergency Contact/Next of Kin name & number: Fanta Brink [phone]    PCP: Pedro Kolb MD  Date of last visit: about 10 days ago     Insurance Provider: [facility] [place]     Discharge Planning    Living Arrangements:  Spouse/Significant Other   Support Systems:  Spouse/Significant Other    Home has 1 stories  2 stairs to climb to get into front door, na stairs to climb to reach second floor  Location of bedroom/bathroom in home first floor     Patient able to perform ADL's:Independent    Current Services (outpatient & in home) [facility]   DME equipment: ORDISSIMO   DME provider: na     Receiving oral anticoagulation therapy? No    If indicated:   Physician managing anticoagulation treatment: na  Where does patient obtain lab work for ATC treatment? na      Potential Assistance Needed:       Patient agreeable to home care: Yes  Freedom of choice provided:  yes, current with [facility] and wants to resume at discharge     Prior SNF/Rehab Placement and Facility: no  Agreeable to SNF/Rehab: No  [place] of choice provided: n/a     Evaluation: no    Expected Discharge date:       Patient expects to be discharged to: Follow Up Appointment: Best Day/ Time:      Transportation provider: family   Transportation arrangements needed for discharge: No    Readmission Risk              Risk of Unplanned Readmission:        0             Does patient have a readmission risk score greater than 14?: No  If yes, follow-up appointment must be made within 7 days of discharge.      Goals of Care: Pain control and increased kidney function     Discharge Plan: Resume [facility]           Electronically signed by Dorie Mercado RN on 20 at 2:56 PM EDT

## 2020-09-16 NOTE — H&P
Family Medicine History and Physical      Name: Merline Card  MRN: [id number]     Acct: [de-identified]  Room: 13/16    Admit Date: 9/16/2020  PCP: Sayra Hilton MD    Chief Complaint:     Chief Complaint   Patient presents with    Abnormal Lab     sent to ED for abnormal renal labs       History Obtained From:     Patient and spouse    History of Present Illness:      Merline Card is a  56 y.o.  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 juliana on cki  He is a long standing diabetic and hypertense  He recently underwent a nephrectomy and partial colectomy for renal infection with subsequent bowel obstruction  He has had some chronic kidney injury subsequently   He presents with elevated k and bun and creatinine and is admitted for same with consult to nephrology   He had a fluid bolus in er and will be on iv fluids    Past Medical History:     Past Medical History:   Diagnosis Date    Back pain     Cellulitis     Diabetes mellitus ([address]     Gout     History of kidney cancer 08/10/2020    Recent diagnosis    Hypertension         Past Surgical History:     Past Surgical History:   Procedure Laterality Date    ABDOMINAL EXPLORATION SURGERY  09/02/2020    EXPLORATORY LAPAROTOMY BOWEL RESECTION     ANKLE SURGERY      CARPAL TUNNEL RELEASE Bilateral     CYSTOSCOPY Right 9/1/2020    CYSTOSCOPY RIGHT URETERAL STENT INSERTION performed by Joshua Flores MD at [address] [place] [address] CATH POWER PICC TRIPLE  9/3/2020         KIDNEY SURGERY Left 9/2/2020    LAPAROSCOPIC XI ROBOTIC NEPHRECTOMY performed [facility] Lashaun Sanchez MD at [facility] Bilateral     SMALL INTESTINE SURGERY N/A 9/2/2020    EXPLORATORY LAPAROTOMY, RESECTION OF PROXIMAL JEJUNUM AND DESCENDING COLON WITH MOBILIZATION OF SPLENIC FLEXURE AND PRIMARY ANASTAMOSISOF SMALL BOWEL AND COLON, PLACEMENT OF GASTROSTOMY TUBE performed by Barrera Holcomb DO at [address] Left 09/02/2020    LAPAROSCOPIC XI ROBOTIC NEPHRECTOMY         Medications Prior to Admission:       Prior to Admission medications    Medication Sig Start Date End Date Taking? Authorizing Provider   gabapentin (NEURONTIN) 300 MG capsule Take 1 capsule by mouth 3 times daily for 7 days. 9/11/20 9/18/20  Fátima Berman MD   insulin glargine (LANTUS SOLOSTAR) 100 UNIT/ML injection pen Inject 60 Units into the skin nightly 9/11/20   Fátima Berman MD   insulin lispro, 1 Unit Dial, (HUMALOG KWIKPEN) 100 UNIT/ML SOPN Inject 0-6 Units into the skin 3 times daily (before meals) **Corrective Low Dose Algorithm**  Glucose: Dose:               No Insulin  140-199 1 Unit  200-249 2 Units  250-299 3 Units  300-349 4 Units  350-399 5 Units  Over 399 6 Units 9/11/20   Fátima Berman MD   ferrous sulfate (IRON 325) 325 (65 Fe) MG tablet Take 1 tablet by mouth daily (with breakfast) 9/11/20   Fátima Berman MD   pantoprazole (PROTONIX) 40 MG tablet Take 1 tablet by mouth daily 9/11/20   Fátima Berman MD   blood glucose monitor strips Test 4 times a day & as needed for symptoms of irregular blood glucose. Dispense sufficient amount for indicated testing frequency plus additional to accommodate PRN testing needs. 9/11/20   Fátima Berman MD   Alcohol Swabs PADS 1 each by Does not apply route 4 times daily 9/11/20   Fátima Berman MD   Insulin Pen Needle ([employer] PEN NEEDLES) 31G X 6 MM MISC 1 each by Does not apply route daily 9/11/20   Fátima Berman MD   Lancets MISC 1 each by Does not apply route 3 times daily 9/11/20   Fátima Berman MD   glucose monitoring kit (FREESTYLE) monitoring kit 1 kit by Does not apply route daily 9/11/20   Fátima Berman MD   traMADol (ULTRAM) 50 MG tablet Take 100 mg by mouth every 6 hours as needed for Pain.     Historical Provider, MD   Melatonin 10 MG TABS Take 10 mg by mouth nightly as needed (SLEEP)    Historical Provider, MD   vitamin C (ASCORBIC ACID) 500 MG tablet Take 500 mg by mouth daily    Historical Provider, MD   acetaminophen (TYLENOL) 500 MG tablet Take 2 tablets by mouth every 6 hours as needed for Pain 8/11/20 8/21/20  Koffi Torrez MD   cyclobenzaprine (FLEXERIL) 10 MG tablet Take 1 tablet by mouth 3 times daily as needed for Muscle spasms  Patient taking differently: Take 10 mg by mouth 2 times daily as needed for Muscle spasms  8/11/20   Koffi Torrez MD   aspirin 81 MG tablet Take 81 mg by mouth nightly     Historical Provider, MD   pravastatin (PRAVACHOL) 20 MG tablet Take 20 mg by mouth daily    Historical Provider, MD   metoprolol (LOPRESSOR) 50 MG tablet Take 50 mg by mouth 2 times daily    Historical Provider, MD   allopurinol (ZYLOPRIM) 100 MG tablet Take 100 mg by mouth daily    Historical Provider, MD   zolpidem (AMBIEN) 10 MG tablet Take by mouth nightly as needed for Sleep    Historical Provider, MD   hydrALAZINE (APRESOLINE) 25 MG tablet Take 25 mg by mouth 3 times daily     Historical Provider, MD        Allergies:       Ampicillin; Pcn [penicillins]; and Tape [adhesive tape]    Social History:     Tobacco:    reports that he has never smoked. He has never used smokeless tobacco.  Alcohol:      reports current alcohol use. Drug Use:  reports no history of drug use.     Family History:     Family History   Problem Relation Age of Onset    Stroke Maternal Grandmother     Stroke Maternal Grandfather        Review of Systems:     Positive and Negative as described in HPI    Constitutional:  negative for  fevers, chills, sweats, fatigue, and weight loss  HEENT:  negative for vision or hearing changes,   Respiratory:  negative for shortness of breath, cough, or congestion  Cardiovascular:  negative for  chest pain, palpitations history of htn  Gastrointestinal:  [place]  Genitourinary:  [place]  Integument/Breast:  negative for rash, skin lesions  Musculoskeletal:  negative for muscle aches or joint pain  Neurological:  negative for headaches, dizziness, lightheadedness, numbness, pain and tingling extrimities  Behavior/Psych:  negative for depression and anxiety    Code Status:  Full Code    Physical Exam:     Vitals:  /80   Pulse 99   Temp 97.9 °F (36.6 °C) (Oral)   Resp 13   Ht 5' 11\" (1.803 m)   Wt 255 lb (115.7 kg)   SpO2 98%   BMI 35.57 kg/m²   Temp (24hrs), Av.9 °F (36.6 °C), Min:97.9 °F (36.6 °C), Max:97.9 °F (36.6 °C)      General appearance - alert, ill appearing, and in no acute distress  Mental status - oriented to person, place, and time with normal affect  Head - normocephalic and atraumatic  Eyes - pupils equal and reactive, extraocular eye movements intact, conjunctiva clear  Ears - hearing appears to be intact  Nose - no drainage noted  Mouth - mucous membranes moist  Neck - supple, no carotid bruits, thyroid not palpable  Chest - clear to auscultation and percussion  normal effort  Heart - normal rate, regular rhythm, no murmurs  Abdomen - soft, nontender, nondistended, bowel sounds present all four quadrants, no masses, hepatomegaly or splenomegaly drainage tube present  Neurological - normal speech, no focal findings or movement disorder noted, cranial nerves II through XII grossly intact  Extremities - peripheral pulses palpable, no pedal edema or calf pain with palpation  Skin - no gross lesions, rashes, or induration noted        Data:     Daily Labs for 3 Days:    Hematology:  Recent Labs     20  1258   WBC 15.9*   HGB 11.5*   HCT 37.8*   *     Chemistry:  Recent Labs     20  1258   *   K 5.3      CO2 18*   GLUCOSE 82   BUN 39*   CREATININE 2.20*   MG 2.0   CALCIUM 9.2     No results for input(s): PROT, LABALBU, LABA1C, W7HOXYX, B9FWQPR, FT4, TSH, AST, ALT, LDH, GGT, ALKPHOS, BILITOT, BILIDIR, AMMONIA, AMYLASE, LIPASE, LACTATE, CHOL, HDL, LDLCHOLESTEROL, CHOLHDLRATIO, TRIG, VLDL, BNP, TROPONINI, CKTOTAL, CKMB, CKMBINDEX in the last 72 hours.       Assesment:     Primary Problem  <principal problem not specified>  debbi on cki  dm2  htn  Status post nephrectomy  Active Hospital Problems    Diagnosis Date Noted    DEBBI (acute kidney injury) ([facility] [N17.9] 10/05/2017       Plan:     1. Admit  2. Iv fluids  3. Nephrology  4. Monitor bmp  5.  Agree with consultants      Electronically signed by Jerad Castro MD on 9/16/2020 at 5:35 PM     Copy sent to Dr. Jerad Castro MD

## 2020-09-16 NOTE — DISCHARGE INSTR - COC
Impairments/Disabilities:178215858}    Nutrition Therapy:  Current Nutrition Therapy:   [address] CRISTOFER Diet List:805514762}    Routes of Feeding: {CHP DME Other Feedings:570083009}  Liquids: {Slp liquid thickness:86414}  Daily Fluid Restriction: {CHP DME Yes amt example:323870461}  Last Modified Barium Swallow with Video (Video Swallowing Test): {Done Not Done AJLK:583456932}    Treatments at the Time of Hospital Discharge:   Respiratory Treatments: ***  Oxygen Therapy:  {Therapy; copd oxygen:71482}  Ventilator:    {MH CC Vent ZCJL:302176905}    Rehab Therapies: {THERAPEUTIC INTERVENTION:6093261637}  Weight Bearing Status/Restrictions: [address]  Weight Bearin}  Other Medical Equipment (for information only, NOT a DME order):  {EQUIPMENT:964605515}  Other Treatments: ***    Patient's personal belongings (please select all that are sent with patient):  {P DME Belongings:949802475}    RN SIGNATURE:  {Esignature:421274234}    CASE MANAGEMENT/SOCIAL WORK SECTION    Inpatient Status Date: ***    Readmission Risk Assessment Score:  Readmission Risk              Risk of Unplanned Readmission:        0           Discharging to Facility/ Agency   · Name: [place] [address]         Phone: [phone]       Fax: [phone]            Dialysis Facility (if applicable)   · Name:  · Address:  · Dialysis Schedule:  · Phone:  · Fax:    / signature: {Esignature:053627877}    PHYSICIAN SECTION    Prognosis: {Prognosis:1891267354}    Condition at Discharge: [address] Patient Condition:708218375}    Rehab Potential (if transferring to Rehab): {Prognosis:0485800031}    Recommended Labs or Other Treatments After Discharge: ***    Physician Certification: I certify the above information and transfer of Ignacio Mojica FINN  is necessary for the continuing treatment of the diagnosis listed and that he requires {Admit to Appropriate Level of Care:34447} for {GREATER/LESS:117734167} 30 days.     Update Admission H&P: {SYED DME Changes in [facility]:246912966}    PHYSICIAN SIGNATURE:  {Esignature:592684248}

## 2020-09-16 NOTE — ED PROVIDER NOTES
[facility]  Emergency Department Encounter  Emergency Medicine Resident     Pt Name: Tavares Valdivia  [facility]:[id number]  Manasgfelizabeth 1960  Date of evaluation: 9/16/20  PCP:  Alice Lousi MD    CHIEF COMPLAINT       Chief Complaint   Patient presents with    Abnormal Lab     sent to ED for abnormal renal labs       HISTORY OF PRESENT ILLNESS  (Location/Symptom, Timing/Onset, Context/Setting, Quality, Duration, ModifyingFactors, Severity.)      Joenathan Schilder II is a 56 y.o. male history of diabetes presents with concern for abnormal labs drawn yesterday, patient was sent in by his urologist Dr Roosevelt Pollard. Patient had a recent right-sided nephrectomy for infection and growth that eroded into the bowel requiring a 2 inch bowel resection. Since then patient has had decreased p.o. intake due to decreased appetite and fear of overloading his kidney. He has had dark-colored urine but denies any dysuria or actual hematuria. Patient has been improving overall, pain is been decreasing, he denies any fevers or chills, chest pain or shortness of breath, dizziness or lightheadedness, muscle spasms or myalgias, fatigue, leg swelling, diarrhea or hematochezia or constipation. PAST MEDICAL / SURGICAL / SOCIAL /FAMILY HISTORY      has a past medical history of Back pain, Cellulitis, Diabetes mellitus ([address] Gout, History of kidney cancer, and Hypertension. No other pertinent PMH on review with patient/guardian. has a past surgical history that includes Ankle surgery; knee surgery (Bilateral); Carpal tunnel release (Bilateral); Cystoscopy (Right, 9/1/2020); total nephrectomy (Left, 09/02/2020); Abdominal exploration surgery (09/02/2020); hc cath power picc triple (9/3/2020); Kidney surgery (Left, 9/2/2020); and Small intestine surgery (N/A, 9/2/2020). No other pertinent PSH on review with patient/guardian.   Social History     Socioeconomic History    Marital status:      Spouse name: Not on file    Number of children: Not on file    Years of education: Not on file    Highest education level: Not on file   Occupational History    Not on file   Social Needs    Financial resource strain: Not on file    Food insecurity     Worry: Not on file     Inability: Not on file    Transportation needs     Medical: Not on file     Non-medical: Not on file   Tobacco Use    Smoking status: Never Smoker    Smokeless tobacco: Never Used   Substance and Sexual Activity    Alcohol use: Yes     Alcohol/week: 0.0 standard drinks     Comment: once a week    Drug use: No    Sexual activity: Not on file   Lifestyle    Physical activity     Days per week: Not on file     Minutes per session: Not on file    Stress: Not on file   Relationships    Social connections     Talks on phone: Not on file     Gets together: Not on file     Attends Confucianism service: Not on file     Active member of club or organization: Not on file     Attends meetings of clubs or organizations: Not on file     Relationship status: Not on file    Intimate partner violence     Fear of current or ex partner: Not on file     Emotionally abused: Not on file     Physically abused: Not on file     Forced sexual activity: Not on file   Other Topics Concern    Not on file   Social History Narrative    Not on file       I counseled the patient against using tobacco products. Family History   Problem Relation Age of Onset    Stroke Maternal Grandmother     Stroke Maternal Grandfather      No other pertinent FamHx on review with patient/guardian. Allergies:  Ampicillin; Pcn [penicillins]; and Tape [adhesive tape]    Home Medications:  Prior to Admission medications    Medication Sig Start Date End Date Taking? Authorizing Provider   gabapentin (NEURONTIN) 300 MG capsule Take 1 capsule by mouth 3 times daily for 7 days.  9/11/20 9/18/20  Era Mike MD   insulin glargine (LANTUS SOLOSTAR) 100 UNIT/ML injection pen Inject 60 Units into the skin nightly 9/11/20   Claudette Sutherland MD   insulin lispro, 1 Unit Dial, (HUMALOG KWIKPEN) 100 UNIT/ML SOPN Inject 0-6 Units into the skin 3 times daily (before meals) **Corrective Low Dose Algorithm**  Glucose: Dose:               No Insulin  140-199 1 Unit  200-249 2 Units  250-299 3 Units  300-349 4 Units  350-399 5 Units  Over 399 6 Units 9/11/20   Claudette Sutherland MD   ferrous sulfate (IRON 325) 325 (65 Fe) MG tablet Take 1 tablet by mouth daily (with breakfast) 9/11/20   Claudette Sutherland MD   pantoprazole (PROTONIX) 40 MG tablet Take 1 tablet by mouth daily 9/11/20   Claudette Sutherland MD   blood glucose monitor strips Test 4 times a day & as needed for symptoms of irregular blood glucose. Dispense sufficient amount for indicated testing frequency plus additional to accommodate PRN testing needs. 9/11/20   Claudette Sutherland MD   Alcohol Swabs PADS 1 each by Does not apply route 4 times daily 9/11/20   Claudette Sutherland MD   Insulin Pen Needle ([employer] PEN NEEDLES) 31G X 6 MM MISC 1 each by Does not apply route daily 9/11/20   Claudette Sutherland MD   Lancets MISC 1 each by Does not apply route 3 times daily 9/11/20   Claudette Sutherland MD   glucose monitoring kit (FREESTYLE) monitoring kit 1 kit by Does not apply route daily 9/11/20   Claudette Sutherland MD   traMADol (ULTRAM) 50 MG tablet Take 100 mg by mouth every 6 hours as needed for Pain.     Historical Provider, MD   Melatonin 10 MG TABS Take 10 mg by mouth nightly as needed (SLEEP)    Historical Provider, MD   vitamin C (ASCORBIC ACID) 500 MG tablet Take 500 mg by mouth daily    Historical Provider, MD   acetaminophen (TYLENOL) 500 MG tablet Take 2 tablets by mouth every 6 hours as needed for Pain 8/11/20 8/21/20  Terrance Yates MD   cyclobenzaprine (FLEXERIL) 10 MG tablet Take 1 tablet by mouth 3 times daily as needed for Muscle spasms  Patient taking differently: Take 10 mg by mouth 2 times daily as needed for Muscle spasms  8/11/20   Terrance Yates MD   aspirin 81 MG tablet Take 81 mg by mouth nightly     Historical Provider, MD   pravastatin (PRAVACHOL) 20 MG tablet Take 20 mg by mouth daily    Historical Provider, MD   metoprolol (LOPRESSOR) 50 MG tablet Take 50 mg by mouth 2 times daily    Historical Provider, MD   allopurinol (ZYLOPRIM) 100 MG tablet Take 100 mg by mouth daily    Historical Provider, MD   zolpidem (AMBIEN) 10 MG tablet Take by mouth nightly as needed for Sleep    Historical Provider, MD   hydrALAZINE (APRESOLINE) 25 MG tablet Take 25 mg by mouth 3 times daily     Historical Provider, MD       REVIEW OF SYSTEMS    (2-9 systems for level 4, 10 ormore for level 5)      Review of Systems   Constitutional: Negative for chills, fatigue and fever. HENT: Negative for sore throat. Eyes: Negative for visual disturbance. Respiratory: Negative for shortness of breath. Cardiovascular: Negative for chest pain. Gastrointestinal: Negative for abdominal pain, nausea and vomiting. Genitourinary: Negative for dysuria and hematuria. Musculoskeletal: Negative for back pain and neck pain. Skin: Negative for rash. Neurological: Negative for syncope, light-headedness and headaches. PHYSICAL EXAM   (up to 7 for level 4, 8 or more for level 5)      INITIAL VITALS:   /80   Pulse 99   Temp 97.9 °F (36.6 °C) (Oral)   Resp 13   Ht 5' 11\" (1.803 m)   Wt 255 lb (115.7 kg)   SpO2 98%   BMI 35.57 kg/m²     Physical Exam  Constitutional:       General: He is not in acute distress. Appearance: He is well-developed. He is not ill-appearing, toxic-appearing or diaphoretic. HENT:      Head: Normocephalic and atraumatic. Right Ear: External ear normal.      Left Ear: External ear normal.      Nose: Nose normal. No rhinorrhea. Eyes:      General:         Right eye: No discharge. Left eye: No discharge. Conjunctiva/sclera: Conjunctivae normal.   Neck:      Musculoskeletal: Normal range of motion and neck supple.    Cardiovascular:      Rate and Rhythm: Normal rate and regular rhythm. Heart sounds: Normal heart sounds. No murmur. No friction rub. No gallop. Pulmonary:      Effort: Pulmonary effort is normal.      Breath sounds: Normal breath sounds. No wheezing or rales. Abdominal:      General: Bowel sounds are normal. There is no distension. Palpations: Abdomen is soft. Tenderness: There is no abdominal tenderness. There is no guarding. Comments: Large abdominal incision with staples in place, healing well, no overlying erythema, fluctuance, discharge. Musculoskeletal: Normal range of motion. Skin:     General: Skin is warm and dry. Neurological:      Mental Status: He is alert and oriented to person, place, and time.          DIFFERENTIAL  DIAGNOSIS     PLAN (LABS / IMAGING / EKG):  Orders Placed This Encounter   Procedures    US RENAL COMPLETE    CBC WITH AUTO DIFFERENTIAL    BASIC METABOLIC PANEL    MAGNESIUM    Urinalysis with microscopic    Basic Metabolic Panel    Telemetry Monitoring    Telemetry Monitoring    Full Code    Inpatient consult to Urology    Inpatient consult to Nephrology    Inpatient consult to [facility]    PATIENT STATUS (FROM ED OR OR/PROCEDURAL) Observation    PATIENT STATUS (FROM ED OR OR/PROCEDURAL) Observation       MEDICATIONS ORDERED:  Orders Placed This Encounter   Medications    0.9 % sodium chloride bolus    DISCONTD: 0.9 % sodium chloride bolus    acetaminophen (TYLENOL) tablet 1,000 mg    aspirin chewable tablet 81 mg    cyclobenzaprine (FLEXERIL) tablet 10 mg    gabapentin (NEURONTIN) capsule 300 mg    hydrALAZINE (APRESOLINE) tablet 25 mg    melatonin tablet 10 mg    metoprolol tartrate (LOPRESSOR) tablet 50 mg    pantoprazole (PROTONIX) tablet 40 mg    pravastatin (PRAVACHOL) tablet 20 mg    zolpidem (AMBIEN) tablet 5 mg    sodium chloride 0.9 % 1,000 mL infusion    ondansetron (ZOFRAN) injection 4 mg    heparin (porcine) injection 5,000 Units           DIAGNOSTIC RESULTS / EMERGENCY DEPARTMENT COURSE / MDM     LABS:  Results for orders placed or performed during the hospital encounter of 09/16/20   CBC WITH AUTO DIFFERENTIAL   Result Value Ref Range    WBC 15.9 (H) 3.5 - 11.3 k/uL    RBC 4.21 4.21 - 5.77 m/uL    Hemoglobin 11.5 (L) 13.0 - 17.0 g/dL    Hematocrit 37.8 (L) 40.7 - 50.3 %    MCV 89.8 82.6 - 102.9 fL    MCH 27.3 25.2 - 33.5 pg    MCHC 30.4 28.4 - 34.8 g/dL    RDW 14.9 (H) 11.8 - 14.4 %    Platelets 390 (H) 757 - 453 k/uL    MPV 10.7 8.1 - 13.5 fL    NRBC Automated 0.0 0.0 per 100 WBC    Differential Type NOT REPORTED     Seg Neutrophils 73 (H) 36 - 65 %    Lymphocytes 11 (L) 24 - 43 %    Monocytes 8 3 - 12 %    Eosinophils % 4 1 - 4 %    Basophils 1 0 - 2 %    Immature Granulocytes 3 (H) 0 %    Segs Absolute 11.68 (H) 1.50 - 8.10 k/uL    Absolute Lymph # 1.76 1.10 - 3.70 k/uL    Absolute Mono # 1.30 (H) 0.10 - 1.20 k/uL    Absolute Eos # 0.55 (H) 0.00 - 0.44 k/uL    Basophils Absolute 0.12 0.00 - 0.20 k/uL    Absolute Immature Granulocyte 0.50 (H) 0.00 - 0.30 k/uL    WBC Morphology NOT REPORTED     RBC Morphology ANISOCYTOSIS PRESENT     Platelet Estimate NOT REPORTED    BASIC METABOLIC PANEL   Result Value Ref Range    Glucose 82 70 - 99 mg/dL    BUN 39 (H) 8 - 23 mg/dL    CREATININE 2.20 (H) 0.70 - 1.20 mg/dL    Bun/Cre Ratio NOT REPORTED 9 - 20    Calcium 9.2 8.6 - 10.4 mg/dL    Sodium 133 (L) 135 - 144 mmol/L    Potassium 5.3 3.7 - 5.3 mmol/L    Chloride 103 98 - 107 mmol/L    CO2 18 (L) 20 - 31 mmol/L    Anion Gap 12 9 - 17 mmol/L    GFR Non-African American 30 (L) >60 mL/min    GFR  37 (L) >60 mL/min    GFR Comment          GFR Staging NOT REPORTED    MAGNESIUM   Result Value Ref Range    Magnesium 2.0 1.6 - 2.6 mg/dL         IMPRESSION/MDM/ED COURSE:  56 y.o. male with recent nephrectomy and partial bowel resection due to infection and renal growth presents with abnormal labs drawn yesterday.   Patient had elevated creatinine and potassium at that time. He has had decreased p.o. intake for fear of overloading his single kidney. He denies any fevers or chills, chest pain or shortness of breath, abdominal pain, nausea or vomiting, dysuria hematuria. On exam patient appears well, slightly tachycardic in the 110s initially, appeared slightly dehydrated, vital signs otherwise stable. Abdominal wounds healing well with staples in place. Will repeat labs, give fluids, and speak with urology. Likely admission for serial labs and fluid rehydration. No signs to suggest infection, nephrolithiasis, pyelonephritis, UTI, intra-abdominal infection. ED Course as of Sep 16 1758   Wed Sep 16, 2020   1508 Previous WBC was 18 when patient had infection, has been improving, afebrile, no other signs to suggest infection at this time. Will run UA. Spoke with nephrology who advised normal saline and will see tomorrow, urology agrees with plan and will see tomorrow. WBC(!): 15.9 [SF]      ED Course User Index  [SF] Fonnie Najjar, DO         RADIOLOGY:  US RENAL COMPLETE   Final Result   Left kidney not visualized. Stent noted in the bladder. EKG  None    All EKG's are interpreted by the Emergency Department Physician who either signs or Co-signs this chart in the absence of a cardiologist.      PROCEDURES:  None    CONSULTS:  IP CONSULT TO UROLOGY  IP CONSULT TO NEPHROLOGY  IP CONSULT TO FAMILY MEDICINE        FINAL IMPRESSION      1.  DEBBI (acute kidney injury) [facility])          DISPOSITION / PLAN     DISPOSITION Admitted 09/16/2020 04:55:14 PM      PATIENT REFERREDTO:  Sallytoni El, [facility] [address] [place] [address]  [phone]            DISCHARGE MEDICATIONS:  New Prescriptions    No medications on file       Fonnie Najjar, DO  PGY 3  Resident Physician Emergency Medicine  09/16/20 5:58 PM        (Please note that portions of this note were completed with a voice recognition program.Efforts were made to edit the dictations but occasionally words are mis-transcribed.)        Monae Flores DO  Resident  09/16/20 [id number]

## 2020-09-16 NOTE — CONSULTS
Matilde Douglass, Yisel Ip, [address] [facility], [facility], & [place]  Urology Consultation      Patient:  Ricardo Robles II  MRN: [id number]  YOB: 1960    CHIEF COMPLAINT:  DEBBI, recent left nephrectomy     HISTORY OF PRESENT ILLNESS:   The patient is a 56 y.o. male who was sent to ED due to elevated creatinine and potassium found on labs drawn by home health. He is known to Dr Pedro Robertson. Was discharged from the hospital on 9/11/20 after undergoing a left robotic radical nephrectomy for renal mass invading small bowel causing SBO, lysis of adhesions, and bowel resection with primary anastomosis by Dr Ovi Haynes. He also had a stent placed on the right 9/1/20 by Dr Wei [facility], [address] for distal ureteral stone. 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 He was discharged home after return of bowel function and tolerating diet and all involved teams ok with discharge. Went home with G tube clamped, staples to abdominal incisions. At time of discharge his creatinine was 1.6. In June creatinine was 1.28, 2017 was 1.17 BUN 24 GFR > 60. No prior history of CKD per patient but notes mention CKD stage 3. Nephrology not consulted during hospital admission. Since returning home pt has had decreased oral intake and appetite. He feels tired and drained but states past couple days has started to feel better, more energy. He doesn't feel like eating too much at this point, trying to take in fluids. Only requiring his basal insulin at night and not sliding scale. Denies nausea or vomiting. Having flatus and BMs. Only came to ED due to lab work. He is voiding on is own. Has not yet been seen for follow up by surgery or urology outpatient. Does still have right ureteral stent for stone that will need treated at some point that was placed 9/1/20 by Dr Wei [facility], [address] at [facility]. Patient's old records, notes and chart reviewed and summarized above.     Past Medical History:    Past Medical History:   Diagnosis Date    Back pain     Cellulitis     Diabetes mellitus ([facility] [address]     Gout     History of kidney cancer 08/10/2020    Recent diagnosis    Hypertension        Past Surgical History:    Past Surgical History:   Procedure Laterality Date    ABDOMINAL EXPLORATION SURGERY  09/02/2020    EXPLORATORY LAPAROTOMY BOWEL RESECTION     ANKLE SURGERY      CARPAL TUNNEL RELEASE Bilateral     CYSTOSCOPY Right 9/1/2020    CYSTOSCOPY RIGHT URETERAL STENT INSERTION performed by Sara Hdz MD at [address] [facility] CATH POWER PICC TRIPLE  9/3/2020         KIDNEY SURGERY Left 9/2/2020    LAPAROSCOPIC XI ROBOTIC NEPHRECTOMY performed by Sandra Nance MD at [address] [facility] Bilateral    [address] N/A 9/2/2020    EXPLORATORY LAPAROTOMY, RESECTION OF PROXIMAL JEJUNUM AND DESCENDING COLON WITH MOBILIZATION OF SPLENIC FLEXURE AND PRIMARY ANASTAMOSISOF SMALL BOWEL AND COLON, PLACEMENT OF GASTROSTOMY TUBE performed by Vazquez Gifford DO at [address] [facility] [address] Left 09/02/2020    LAPAROSCOPIC XI ROBOTIC NEPHRECTOMY        Medications:      Current Facility-Administered Medications:     acetaminophen (TYLENOL) tablet 1,000 mg, 1,000 mg, Oral, Q6H PRN, Ankita Stair, DO    aspirin chewable tablet 81 mg, 81 mg, Oral, Nightly, Jeramy Cat, DO    cyclobenzaprine (FLEXERIL) tablet 10 mg, 10 mg, Oral, BID PRN, Ankita Stair, DO    gabapentin (NEURONTIN) capsule 300 mg, 300 mg, Oral, TID, Jeramy Cat, DO    hydrALAZINE (APRESOLINE) tablet 25 mg, 25 mg, Oral, TID, Jeramy Cat, DO    melatonin tablet 10 mg, 10 mg, Oral, Nightly PRN, Ankita Stair, DO    metoprolol tartrate (LOPRESSOR) tablet 50 mg, 50 mg, Oral, BID, Jeramy Cat, DO    pantoprazole (PROTONIX) tablet 40 mg, 40 mg, Oral, Daily, Jeramy Cat, DO    pravastatin (PRAVACHOL) tablet 20 mg, 20 mg, Oral, Daily, Jeramy Cat, DO    zolpidem (AMBIEN) tablet 5 mg, 5 mg, Oral, Nightly PRN, Jeramy Cat,     sodium chloride 0.9 % 1,000 mL infusion, , Intravenous, Continuous, Jeramy Cat,     ondansetron [facility]) injection 4 mg, 4 mg, Intravenous, Q8H PRN, Jeramy Cat DO    heparin (porcine) injection 5,000 Units, 5,000 Units, Subcutaneous, 3 times per day, Rickey Buck DO    Current Outpatient Medications:     gabapentin (NEURONTIN) 300 MG capsule, Take 1 capsule by mouth 3 times daily for 7 days. , Disp: 21 capsule, Rfl: 0    insulin glargine (LANTUS SOLOSTAR) 100 UNIT/ML injection pen, Inject 60 Units into the skin nightly, Disp: 5 pen, Rfl: 1    insulin lispro, 1 Unit Dial, (HUMALOG KWIKPEN) 100 UNIT/ML SOPN, Inject 0-6 Units into the skin 3 times daily (before meals) **Corrective Low Dose Algorithm** Glucose: Dose:              No Insulin 140-199 1 Unit 200-249 2 Units 250-299 3 Units 300-349 4 Units 350-399 5 Units Over 399 6 Units, Disp: 5 pen, Rfl: 0    ferrous sulfate (IRON 325) 325 (65 Fe) MG tablet, Take 1 tablet by mouth daily (with breakfast), Disp: 60 tablet, Rfl: 0    pantoprazole (PROTONIX) 40 MG tablet, Take 1 tablet by mouth daily, Disp: 60 tablet, Rfl: 0    blood glucose monitor strips, Test 4 times a day & as needed for symptoms of irregular blood glucose. Dispense sufficient amount for indicated testing frequency plus additional to accommodate PRN testing needs. , Disp: 120 strip, Rfl: 0    Alcohol Swabs PADS, 1 each by Does not apply route 4 times daily, Disp: 120 each, Rfl: 0    Insulin Pen Needle ([employer] PEN NEEDLES) 31G X 6 MM MISC, 1 each by Does not apply route daily, Disp: 100 each, Rfl: 3    Lancets MISC, 1 each by Does not apply route 3 times daily, Disp: 200 each, Rfl: 0    glucose monitoring kit (FREESTYLE) monitoring kit, 1 kit by Does not apply route daily, Disp: 1 kit, Rfl: 0    traMADol (ULTRAM) 50 MG tablet, Take 100 mg by mouth every 6 hours as needed for Pain., Disp: , Rfl:     Melatonin 10 MG TABS, Take 10 mg by mouth nightly as needed (SLEEP), Disp: , Rfl:     vitamin C (ASCORBIC ACID) 500 MG tablet, Take 500 mg by mouth daily, Disp: , Rfl:     acetaminophen (TYLENOL) 500 MG tablet, Take 2 tablets by mouth every 6 hours as needed for Pain, Disp: 40 tablet, Rfl: 0    cyclobenzaprine (FLEXERIL) 10 MG tablet, Take 1 tablet by mouth 3 times daily as needed for Muscle spasms (Patient taking differently: Take 10 mg by mouth 2 times daily as needed for Muscle spasms ), Disp: 30 tablet, Rfl: 0    aspirin 81 MG tablet, Take 81 mg by mouth nightly , Disp: , Rfl:     pravastatin (PRAVACHOL) 20 MG tablet, Take 20 mg by mouth daily, Disp: , Rfl:     metoprolol (LOPRESSOR) 50 MG tablet, Take 50 mg by mouth 2 times daily, Disp: , Rfl:     allopurinol (ZYLOPRIM) 100 MG tablet, Take 100 mg by mouth daily, Disp: , Rfl:     zolpidem (AMBIEN) 10 MG tablet, Take by mouth nightly as needed for Sleep, Disp: , Rfl:     hydrALAZINE (APRESOLINE) 25 MG tablet, Take 25 mg by mouth 3 times daily , Disp: , Rfl:     Allergies: Allergies   Allergen Reactions    Ampicillin Swelling     Swelling of throat.  Pcn [Penicillins] Swelling     Throat swelling. Tolerated cefepime during 8/31/20 admission.  Tape Hazle Blayne Tape]        Social History:   Social History     Socioeconomic History    Marital status:      Spouse name: Not on file    Number of children: Not on file    Years of education: Not on file    Highest education level: Not on file   Occupational History    Not on file   Social Needs    Financial resource strain: Not on file    Food insecurity     Worry: Not on file     Inability: Not on file    Transportation needs     Medical: Not on file     Non-medical: Not on file   Tobacco Use    Smoking status: Never Smoker    Smokeless tobacco: Never Used   Substance and Sexual Activity    Alcohol use:  Yes     Alcohol/week: 0.0 standard drinks     Comment: once a week    Drug use: No    Sexual activity: Not on file   Lifestyle    Physical activity     Days per week: Not on file     Minutes per session: Not on file    Stress: Not on file   Relationships    Social connections     Talks on phone: Not on file     Gets together: Not on file     Attends Congregation service: Not on file     Active member of club or organization: Not on file     Attends meetings of clubs or organizations: Not on file     Relationship status: Not on file    Intimate partner violence     Fear of current or ex partner: Not on file     Emotionally abused: Not on file     Physically abused: Not on file     Forced sexual activity: Not on file   Other Topics Concern    Not on file   Social History Narrative    Not on file       Family History:    Family History   Problem Relation Age of Onset    Stroke Maternal Grandmother     Stroke Maternal Grandfather        REVIEW OF SYSTEMS:  A comprehensive 14 point review of systems was obtained. Constitutional: + for fatigue, tired  Eyes: No blurry vision  Ears, nose, mouth, throat, face: No ringing in the ears; no facial droop  Respiratory: No cough or cold  Cardiovascular: No palpitations  Gastrointestinal: No diarrhea or constipation, + BM  Genitourinary: See HPI  Integument/Skin: No rashes, no edema in legs   Hematologic/Lymphatic: No easy bruising  Musculoskeletal: No muscle pains  Neurologic: No weakness in the extremities  Psychiatric: No depression or suicidal thoughts  Endocrine: No heat or cold intolerances  Allergic/Immunologic: No current seasonal allergies; no skin hives      Physical Exam:    This a 56 y.o. male   Vitals:    09/16/20 1249   BP: 102/72   Pulse: 110   Resp: 17   Temp: 97.9 °F (36.6 °C)   SpO2: 100%       Constitutional: Patient in no acute distress  Neuro: Alert and oriented to person place and time  Psych: Mood and affect normal  Head: Atraumatic and normocephalic  Neck: Trachea midline  Lungs: Respiratory effort normal  Cardiovascular:  Regular rhythm  Abdomen: Soft, non-tender, non-distended. CVA tenderness none. Incisions both midline and robotic ports c/d/i with staples.  Look healed and staples could potentially come out. G tube in place to left abdomen  Ext: 2+ DP pulses bilaterally, no edema   Skin: No rashes or bruising present  Bladder: Non-tender and not distended  Lymphatics: No palpable lymphadenopathy    :  Penis normal and circumcised  Urethral meatus normal  Scrotal exam normal  Testicles normal bilaterally  Epididymis normal bilaterally  No evidence of inguinal hernia  Rectal exam deferred     Labs:  Recent Labs     09/16/20  1258   WBC 15.9*   HGB 11.5*   HCT 37.8*   MCV 89.8   *     Recent Labs     09/16/20  1258   *   K 5.3      CO2 18*   BUN 39*   CREATININE 2.20*       No results for input(s): COLORU, PHUR, LABCAST, WBCUA, RBCUA, MUCUS, TRICHOMONAS, YEAST, BACTERIA, CLARITYU, SPECGRAV, LEUKOCYTESUR, UROBILINOGEN, BILIRUBINUR, BLOODU in the last 72 hours. Invalid input(s): Doylene Sergeant    Culture Results:  8/31/20 and 9/2/20 urine and blood cultures no growth    -----------------------------------------------------------------  Imaging Results: Will get renal US  No results found.     Assessment and Plan   Impression:  57 yo male with DEBBI, hyperkalemia, dehydration      problem list -   - S/P Laparoscopic robotic left nephrectomy and exploratory laparotomy with bowel resection of proximal jejunum and descending colon 9/2/20  - Acute blood loss anemia - hgb 11.5 today  - acute kidney injury on CKD stage IIIA (baseline Cr 1.4)  - right distal ureteral stone s/p cystoscopy and right ureteral stent placement by  [address] 9/1/20  - platelet dysfunction on aspirin 81 mg  - comorbid conditions: diabetes mellitus type 2, HTN, obesity and chronic kidney disease.  - Pathology: Negative for malignancy, Left Kidney with xanthogranulomatous pyelonephritis    Plan:   Pt being admitted to observation  Appreciate nephrology recs for DEBBI and now solitary kidney  Will order renal US  Will message gen surg about potential G tube removal while inpatient and staples  No acute urology intervention  Agree with IV fluids for hydration  Diet as tolerated and PO fluid intake  Needs right ureteral stent and stone management on outpatient basis with Dr Alize Collins in next 1-2 months      Thank you for involving us in the care of [place] II. Should you have any questions, please do not hesitate to contact us at any time.       Ty Green PA-C  Urology Service   2:58 PM 9/16/2020

## 2020-09-16 NOTE — ED PROVIDER NOTES
[address] HANDOFF       Handoff taken on the following patient from prior Attending Physician:  Pt Name: Jan Casiano II  PCP:  Genia Willis MD    Attestation  I was available and discussed any additional care issues that arose and coordinated the management plans with the resident(s) caring for the patient during my duty period. Any areas of disagreement with resident's documentation of care or procedures are noted on the chart. I was personally present for the key portions of any/all procedures during my duty period. I have documented in the chart those procedures where I was not present during the key portions. CHIEF COMPLAINT       Chief Complaint   Patient presents with    Abnormal Lab     sent to ED for abnormal renal labs         CURRENT MEDICATIONS     Previous Medications  Previous Medications    ACETAMINOPHEN (TYLENOL) 500 MG TABLET    Take 2 tablets by mouth every 6 hours as needed for Pain    ALCOHOL SWABS PADS    1 each by Does not apply route 4 times daily    ALLOPURINOL (ZYLOPRIM) 100 MG TABLET    Take 100 mg by mouth daily    ASPIRIN 81 MG TABLET    Take 81 mg by mouth nightly     BLOOD GLUCOSE MONITOR STRIPS    Test 4 times a day & as needed for symptoms of irregular blood glucose. Dispense sufficient amount for indicated testing frequency plus additional to accommodate PRN testing needs. CYCLOBENZAPRINE (FLEXERIL) 10 MG TABLET    Take 1 tablet by mouth 3 times daily as needed for Muscle spasms    FERROUS SULFATE (IRON 325) 325 (65 FE) MG TABLET    Take 1 tablet by mouth daily (with breakfast)    GABAPENTIN (NEURONTIN) 300 MG CAPSULE    Take 1 capsule by mouth 3 times daily for 7 days.     GLUCOSE MONITORING KIT (FREESTYLE) MONITORING KIT    1 kit by Does not apply route daily    HYDRALAZINE (APRESOLINE) 25 MG TABLET    Take 25 mg by mouth 3 times daily     INSULIN GLARGINE (LANTUS SOLOSTAR) 100 UNIT/ML INJECTION PEN    Inject 60 Units into the CBC WITH AUTO DIFFERENTIAL - Abnormal; Notable for the following components:       Result Value    WBC 15.9 (*)     Hemoglobin 11.5 (*)     Hematocrit 37.8 (*)     RDW 14.9 (*)     Platelets 556 (*)     Seg Neutrophils 73 (*)     Lymphocytes 11 (*)     Immature Granulocytes 3 (*)     Segs Absolute 11.68 (*)     Absolute Mono # 1.30 (*)     Absolute Eos # 0.55 (*)     Absolute Immature Granulocyte 0.50 (*)     All other components within normal limits   BASIC METABOLIC PANEL - Abnormal; Notable for the following components:    BUN 39 (*)     CREATININE 2.20 (*)     Sodium 133 (*)     CO2 18 (*)     GFR Non- 30 (*)     GFR  37 (*)     All other components within normal limits   MAGNESIUM   URINALYSIS WITH MICROSCOPIC           PLAN/ TASKS OUTSTANDING     This patient is a 56 y.o. Male. 56year-old status post radical nephrectomy, elevated creatinine potassium 5 6, decreased p.o. intake with dark urine. Plan for [facility] for urology and nephrology.     Patient is under the care of the admitting service and I am available for any acute decompensation    (Please note that portions of this note were completed with a voice recognition program.  Efforts were made to edit the dictations but occasionally words are mis-transcribed.)    Bey MD  Attending Emergency Physician       Yanira Mcneill MD  09/16/20 4406

## 2020-09-16 NOTE — ED NOTES
Dr. Trae Lee at bedside re-evaluating patient and updating on test results and poc.      Julieta Shell RN  09/16/20 2723

## 2020-09-16 NOTE — ED TRIAGE NOTES
Pt presents to ED accompanied by wife for abnormal labs. Pt states received call from Dr. Jillian Hanna office to come to ER for nephrology consult d/t elevated creatinine. Pt had left nephrectomy w/ small bowel resection on 9/2 w/ gastrostomy tube to LUQ. Pt does c/o mild abdominal pain. Pt denies any chest pain, sob, fevers, chills or cough. Pt placed on full cardiac monitor, IV established. Call light within reach. Awaiting physician evaluation and further orders. Will continue to monitor.

## 2020-09-16 NOTE — ED PROVIDER NOTES
[facility]     Emergency Department     Faculty Attestation    I performed a history and physical examination of the patient and discussed management with the resident. I have reviewed and agree with the residents findings including all diagnostic interpretations, and treatment plans as written. Any areas of disagreement are noted on the chart. I was personally present for the key portions of any procedures. I have documented in the chart those procedures where I was not present during the key portions. I have reviewed the emergency nurses triage note. I agree with the chief complaint, past medical history, past surgical history, allergies, medications, social and family history as documented unless otherwise noted below. Documentation of the HPI, Physical Exam and Medical Decision Making performed by pam is based on my personal performance of the HPI, PE and MDM. For Physician Assistant/ Nurse Practitioner cases/documentation I have personally evaluated this patient and have completed at least one if not all key elements of the E/M (history, physical exam, and MDM). Additional findings are as noted. Sent to ER due to elevated creatinine. Patient with recent nephrectomy, by urology. Had outpatient lab test that showed an elevated creatinine and a slightly elevated potassium. Patient reports some pain from his incision. Decreased oral intake. And dark-colored urine which is been consistent since his surgery. He is passing gas and having loose stools. No nausea or vomiting. No fevers or chills    On exam patient well-appearing nontoxic speaking in full sentences in no distress. He does have a surgical scar from recent nephrectomy with staples in place. Incision is well approximated no drainage or discharge noted no erythema or edema noted. Will plan on labs, UA.  Nephrology consult  IVF likely admission    Leonid Padgett D.O, TADEOP.H  Attending Emergency Medicine Physician         Britney Shah,   09/16/20 5745

## 2020-09-16 NOTE — ED NOTES
Pt resting on stretcher, NAD noted, respirations even and non labored. Family member at bedside. Call light within reach, will continue to monitor. Renal meal order faxed for patient.       Caro Cole RN  09/16/20 8510

## 2020-09-17 VITALS
SYSTOLIC BLOOD PRESSURE: 137 MMHG | DIASTOLIC BLOOD PRESSURE: 88 MMHG | BODY MASS INDEX: 35.7 KG/M2 | WEIGHT: 255 LBS | HEART RATE: 111 BPM | TEMPERATURE: 97.9 F | HEIGHT: 71 IN | OXYGEN SATURATION: 98 % | RESPIRATION RATE: 12 BRPM

## 2020-09-17 LAB
-: ABNORMAL
-: NORMAL
ABSOLUTE EOS #: 0.61 K/UL (ref 0–0.44)
ABSOLUTE IMMATURE GRANULOCYTE: 0.24 K/UL (ref 0–0.3)
ABSOLUTE LYMPH #: 1.73 K/UL (ref 1.1–3.7)
ABSOLUTE MONO #: 0.98 K/UL (ref 0.1–1.2)
AMORPHOUS: ABNORMAL
AMORPHOUS: NORMAL
ANION GAP SERPL CALCULATED.3IONS-SCNC: 10 MMOL/L (ref 9–17)
ANION GAP SERPL CALCULATED.3IONS-SCNC: 11 MMOL/L (ref 9–17)
ANION GAP SERPL CALCULATED.3IONS-SCNC: 8 MMOL/L (ref 9–17)
BACTERIA: ABNORMAL
BACTERIA: NORMAL
BASOPHILS # BLD: 1 % (ref 0–2)
BASOPHILS ABSOLUTE: 0.11 K/UL (ref 0–0.2)
BILIRUBIN URINE: NEGATIVE
BILIRUBIN URINE: NEGATIVE
BUN BLDV-MCNC: 25 MG/DL (ref 8–23)
BUN BLDV-MCNC: 26 MG/DL (ref 8–23)
BUN BLDV-MCNC: 33 MG/DL (ref 8–23)
BUN/CREAT BLD: ABNORMAL (ref 9–20)
CALCIUM SERPL-MCNC: 7.7 MG/DL (ref 8.6–10.4)
CALCIUM SERPL-MCNC: 8.7 MG/DL (ref 8.6–10.4)
CALCIUM SERPL-MCNC: 9.1 MG/DL (ref 8.6–10.4)
CASTS UA: ABNORMAL /LPF (ref 0–8)
CASTS UA: NORMAL /LPF (ref 0–8)
CHLORIDE BLD-SCNC: 108 MMOL/L (ref 98–107)
CHLORIDE BLD-SCNC: 109 MMOL/L (ref 98–107)
CHLORIDE BLD-SCNC: 111 MMOL/L (ref 98–107)
CO2: 12 MMOL/L (ref 20–31)
CO2: 16 MMOL/L (ref 20–31)
CO2: 18 MMOL/L (ref 20–31)
COLOR: YELLOW
COLOR: YELLOW
COMMENT UA: ABNORMAL
CREAT SERPL-MCNC: 1.49 MG/DL (ref 0.7–1.2)
CREAT SERPL-MCNC: 1.81 MG/DL (ref 0.7–1.2)
CREAT SERPL-MCNC: 2.01 MG/DL (ref 0.7–1.2)
CRYSTALS, UA: ABNORMAL /HPF
CRYSTALS, UA: NORMAL /HPF
DIFFERENTIAL TYPE: ABNORMAL
EOSINOPHILS RELATIVE PERCENT: 6 % (ref 1–4)
EPITHELIAL CELLS UA: ABNORMAL /HPF (ref 0–5)
EPITHELIAL CELLS UA: NORMAL /HPF (ref 0–5)
GFR AFRICAN AMERICAN: 41 ML/MIN
GFR AFRICAN AMERICAN: 46 ML/MIN
GFR AFRICAN AMERICAN: 58 ML/MIN
GFR NON-AFRICAN AMERICAN: 34 ML/MIN
GFR NON-AFRICAN AMERICAN: 38 ML/MIN
GFR NON-AFRICAN AMERICAN: 48 ML/MIN
GFR SERPL CREATININE-BSD FRML MDRD: ABNORMAL ML/MIN/{1.73_M2}
GLUCOSE BLD-MCNC: 63 MG/DL (ref 75–110)
GLUCOSE BLD-MCNC: 69 MG/DL (ref 70–99)
GLUCOSE BLD-MCNC: 85 MG/DL (ref 70–99)
GLUCOSE BLD-MCNC: 89 MG/DL (ref 70–99)
GLUCOSE BLD-MCNC: 92 MG/DL (ref 75–110)
GLUCOSE BLD-MCNC: 93 MG/DL (ref 75–110)
GLUCOSE BLD-MCNC: 94 MG/DL (ref 75–110)
GLUCOSE URINE: NEGATIVE
GLUCOSE URINE: NEGATIVE
HCT VFR BLD CALC: 37.9 % (ref 40.7–50.3)
HEMOGLOBIN: 10.8 G/DL (ref 13–17)
IMMATURE GRANULOCYTES: 3 %
KETONES, URINE: NEGATIVE
KETONES, URINE: NEGATIVE
LEUKOCYTE ESTERASE, URINE: ABNORMAL
LEUKOCYTE ESTERASE, URINE: ABNORMAL
LYMPHOCYTES # BLD: 18 % (ref 24–43)
MCH RBC QN AUTO: 26.5 PG (ref 25.2–33.5)
MCHC RBC AUTO-ENTMCNC: 28.5 G/DL (ref 28.4–34.8)
MCV RBC AUTO: 93.1 FL (ref 82.6–102.9)
MONOCYTES # BLD: 10 % (ref 3–12)
MUCUS: ABNORMAL
MUCUS: NORMAL
NITRITE, URINE: NEGATIVE
NITRITE, URINE: NEGATIVE
NRBC AUTOMATED: 0 PER 100 WBC
OTHER OBSERVATIONS UA: ABNORMAL
OTHER OBSERVATIONS UA: NORMAL
PDW BLD-RTO: 14.8 % (ref 11.8–14.4)
PH UA: 5 (ref 5–8)
PH UA: 5 (ref 5–8)
PLATELET # BLD: 443 K/UL (ref 138–453)
PLATELET ESTIMATE: ABNORMAL
PMV BLD AUTO: 11.5 FL (ref 8.1–13.5)
POTASSIUM SERPL-SCNC: 4.6 MMOL/L (ref 3.7–5.3)
POTASSIUM SERPL-SCNC: 5.1 MMOL/L (ref 3.7–5.3)
POTASSIUM SERPL-SCNC: 5.6 MMOL/L (ref 3.7–5.3)
PROTEIN UA: ABNORMAL
PROTEIN UA: ABNORMAL
RBC # BLD: 4.07 M/UL (ref 4.21–5.77)
RBC # BLD: ABNORMAL 10*6/UL
RBC UA: ABNORMAL /HPF (ref 0–4)
RBC UA: NORMAL /HPF (ref 0–4)
RENAL EPITHELIAL, UA: ABNORMAL /HPF
RENAL EPITHELIAL, UA: NORMAL /HPF
SEG NEUTROPHILS: 62 % (ref 36–65)
SEGMENTED NEUTROPHILS ABSOLUTE COUNT: 5.87 K/UL (ref 1.5–8.1)
SODIUM BLD-SCNC: 131 MMOL/L (ref 135–144)
SODIUM BLD-SCNC: 136 MMOL/L (ref 135–144)
SODIUM BLD-SCNC: 136 MMOL/L (ref 135–144)
SPECIFIC GRAVITY UA: 1.01 (ref 1–1.03)
SPECIFIC GRAVITY UA: 1.01 (ref 1–1.03)
TRICHOMONAS: ABNORMAL
TRICHOMONAS: NORMAL
TURBIDITY: CLEAR
TURBIDITY: CLEAR
URINE HGB: ABNORMAL
URINE HGB: NEGATIVE
UROBILINOGEN, URINE: NORMAL
UROBILINOGEN, URINE: NORMAL
WBC # BLD: 9.5 K/UL (ref 3.5–11.3)
WBC # BLD: ABNORMAL 10*3/UL
WBC UA: ABNORMAL /HPF (ref 0–5)
WBC UA: NORMAL /HPF (ref 0–5)
YEAST: ABNORMAL
YEAST: NORMAL

## 2020-09-17 PROCEDURE — 85025 COMPLETE CBC W/AUTO DIFF WBC: CPT

## 2020-09-17 PROCEDURE — 2580000003 HC RX 258: Performed by: STUDENT IN AN ORGANIZED HEALTH CARE EDUCATION/TRAINING PROGRAM

## 2020-09-17 PROCEDURE — 96372 THER/PROPH/DIAG INJ SC/IM: CPT

## 2020-09-17 PROCEDURE — G0378 HOSPITAL OBSERVATION PER HR: HCPCS

## 2020-09-17 PROCEDURE — 6360000002 HC RX W HCPCS: Performed by: STUDENT IN AN ORGANIZED HEALTH CARE EDUCATION/TRAINING PROGRAM

## 2020-09-17 PROCEDURE — 82947 ASSAY GLUCOSE BLOOD QUANT: CPT

## 2020-09-17 PROCEDURE — 6370000000 HC RX 637 (ALT 250 FOR IP): Performed by: STUDENT IN AN ORGANIZED HEALTH CARE EDUCATION/TRAINING PROGRAM

## 2020-09-17 PROCEDURE — 80048 BASIC METABOLIC PNL TOTAL CA: CPT

## 2020-09-17 PROCEDURE — 81001 URINALYSIS AUTO W/SCOPE: CPT

## 2020-09-17 PROCEDURE — 6370000000 HC RX 637 (ALT 250 FOR IP): Performed by: FAMILY MEDICINE

## 2020-09-17 RX ORDER — SODIUM BICARBONATE 650 MG/1
650 TABLET ORAL 2 TIMES DAILY
Qty: 100 TABLET | Refills: 0 | Status: SHIPPED | OUTPATIENT
Start: 2020-09-17 | End: 2020-10-28

## 2020-09-17 RX ORDER — TRAMADOL HYDROCHLORIDE 50 MG/1
50 TABLET ORAL ONCE
Status: COMPLETED | OUTPATIENT
Start: 2020-09-17 | End: 2020-09-17

## 2020-09-17 RX ADMIN — HEPARIN SODIUM 5000 UNITS: 5000 INJECTION INTRAVENOUS; SUBCUTANEOUS at 05:56

## 2020-09-17 RX ADMIN — GABAPENTIN 300 MG: 300 CAPSULE ORAL at 08:02

## 2020-09-17 RX ADMIN — METOPROLOL TARTRATE 50 MG: 50 TABLET, FILM COATED ORAL at 21:59

## 2020-09-17 RX ADMIN — TRAMADOL HYDROCHLORIDE 50 MG: 50 TABLET, FILM COATED ORAL at 16:40

## 2020-09-17 RX ADMIN — METOPROLOL TARTRATE 50 MG: 50 TABLET, FILM COATED ORAL at 08:02

## 2020-09-17 RX ADMIN — PANTOPRAZOLE SODIUM 40 MG: 40 TABLET, DELAYED RELEASE ORAL at 08:02

## 2020-09-17 RX ADMIN — HYDRALAZINE HYDROCHLORIDE 25 MG: 50 TABLET, FILM COATED ORAL at 08:02

## 2020-09-17 RX ADMIN — GABAPENTIN 300 MG: 300 CAPSULE ORAL at 14:42

## 2020-09-17 RX ADMIN — SODIUM CHLORIDE: 9 INJECTION, SOLUTION INTRAVENOUS at 03:15

## 2020-09-17 RX ADMIN — SODIUM CHLORIDE: 9 INJECTION, SOLUTION INTRAVENOUS at 12:58

## 2020-09-17 RX ADMIN — HYDRALAZINE HYDROCHLORIDE 25 MG: 50 TABLET, FILM COATED ORAL at 14:42

## 2020-09-17 RX ADMIN — HYDRALAZINE HYDROCHLORIDE 25 MG: 50 TABLET, FILM COATED ORAL at 22:01

## 2020-09-17 RX ADMIN — FERROUS SULFATE TAB EC 325 MG (65 MG FE EQUIVALENT) 325 MG: 325 (65 FE) TABLET DELAYED RESPONSE at 08:02

## 2020-09-17 RX ADMIN — GABAPENTIN 300 MG: 300 CAPSULE ORAL at 22:02

## 2020-09-17 RX ADMIN — HEPARIN SODIUM 5000 UNITS: 5000 INJECTION INTRAVENOUS; SUBCUTANEOUS at 14:41

## 2020-09-17 RX ADMIN — ASPIRIN 81 MG: 81 TABLET, CHEWABLE ORAL at 22:02

## 2020-09-17 ASSESSMENT — PAIN SCALES - GENERAL: PAINLEVEL_OUTOF10: 7

## 2020-09-17 NOTE — ED NOTES
Writer spoke w/ Dr. Roge Law about tramadol order for back pain      Murali Escobar RN  09/17/20 9380

## 2020-09-17 NOTE — ED NOTES
Pt resting in bed comfortably, CPAP machine in place from home, NAD, no needs at this time, will continue to monitor     Juli Stanley RN  09/17/20 1445

## 2020-09-17 NOTE — PROGRESS NOTES
Progress Note    9/17/2020   11:07 AM    Name:  Debra Palacios  MRN:    [id number]     Acct:     [de-identified]   Room:  15/15  IP Day:   Progress Note    0     Admit Date: 9/16/2020 12:39 PM  PCP: Ari Nicholson MD    Subjective:     C/C:   Chief Complaint   Patient presents with    Abnormal Lab     sent to ED for abnormal renal labs       Interval History: Status: improved. Pt examined chart reviewed as above    admitted with juliana on cki post nephrectomy and bowel resection  Cr improved overnight but is not back to baseline  He denied new issues pending nephrology note  Agree with consultants  glucose is acceptable    Ros  General no weight loss or fever  ent no trouble hearing tasting talking or swallowing  Cardiac no chest pain or dyspnea  Respiratory no cough or dyspnea  Gi [place]  gu [place]  Ortho no complaints of synovitis or decreased range of motion  Neuro no complaints of gait station or speech  Psych no complaints or nerves or memory  Vascular no claudication or stroke  Endo no  Complaints of dm or thyroid  Heme no complaints of anemia or blood dyscrasias      Medications: Allergies: Allergies   Allergen Reactions    Ampicillin Swelling     Swelling of throat.  Pcn [Penicillins] Swelling     Throat swelling. Tolerated cefepime during 8/31/20 admission.     Tape Mardel Gutting Tape]        Current Meds: acetaminophen (TYLENOL) tablet 1,000 mg, Q6H PRN  aspirin chewable tablet 81 mg, Nightly  cyclobenzaprine (FLEXERIL) tablet 10 mg, BID PRN  ferrous sulfate (FE TABS 325) EC tablet 325 mg, Daily with breakfast  gabapentin (NEURONTIN) capsule 300 mg, TID  hydrALAZINE (APRESOLINE) tablet 25 mg, TID  melatonin tablet 10 mg, Nightly PRN  metoprolol tartrate (LOPRESSOR) tablet 50 mg, BID  pantoprazole (PROTONIX) tablet 40 mg, Daily  pravastatin (PRAVACHOL) tablet 20 mg, Daily  zolpidem (AMBIEN) tablet 5 mg, Nightly PRN  sodium chloride 0.9 % 1,000 mL infusion, Continuous  ondansetron (ZOFRAN) injection 4 mg, Q8H PRN  heparin (porcine) injection 5,000 Units, 3 times per day  acetaminophen (TYLENOL) tablet 650 mg, Q6H PRN    Or  acetaminophen (TYLENOL) suppository 650 mg, Q6H PRN  polyethylene glycol (GLYCOLAX) packet 17 g, Daily PRN  promethazine (PHENERGAN) tablet 12.5 mg, Q6H PRN    Or  ondansetron (ZOFRAN) injection 4 mg, Q6H PRN  glucose (GLUTOSE) 40 % oral gel 15 g, PRN  dextrose 50 % IV solution, PRN  glucagon (rDNA) injection 1 mg, PRN  dextrose 5 % solution, PRN  insulin lispro (HUMALOG) injection vial 0-12 Units, TID WC  insulin lispro (HUMALOG) injection vial 0-6 Units, Nightly        Data:     Code Status:  Full Code    Family History   Problem Relation Age of Onset    Stroke Maternal Grandmother     Stroke Maternal Grandfather        Social History     Socioeconomic History    Marital status:      Spouse name: Not on file    Number of children: Not on file    Years of education: Not on file    Highest education level: Not on file   Occupational History    Not on file   Social Needs    Financial resource strain: Not on file    Food insecurity     Worry: Not on file     Inability: Not on file    Transportation needs     Medical: Not on file     Non-medical: Not on file   Tobacco Use    Smoking status: Never Smoker    Smokeless tobacco: Never Used   Substance and Sexual Activity    Alcohol use:  Yes     Alcohol/week: 0.0 standard drinks     Comment: once a week    Drug use: No    Sexual activity: Not on file   Lifestyle    Physical activity     Days per week: Not on file     Minutes per session: Not on file    Stress: Not on file   Relationships    Social connections     Talks on phone: Not on file     Gets together: Not on file     Attends [employer] service: Not on file     Active member of club or organization: Not on file     Attends meetings of clubs or organizations: Not on file     Relationship status: Not on file    Intimate partner violence     Fear of current or ex partner: Not carotids  Ortho- no masses or synovitis  Good rom  Lymphatics none palpated in cervical supraclavicular axillary  Or inguinal area    Assessment:        Primary Problem  <principal problem not specified>     Active Hospital Problems    Diagnosis Date Noted    DEBBI (acute kidney injury) ([facility] [address] [N17.9] 10/05/2017     Past Medical History:   Diagnosis Date    Back pain     Cellulitis     Diabetes mellitus ([facility] [address]     Gout     History of kidney cancer 08/10/2020    Recent diagnosis    Hypertension         Plan:        Continue same plan of care   iv and oral hydration  Monitor renal function   nephrology note pending   Agree with consultants    Electronically signed by Becki Delacruz MD on 9/17/2020 at 11:07 AM

## 2020-09-17 NOTE — ED NOTES
Pt medicated with all evening meds  No other needs at this time  NAD  Resting comfortably in [facility]     Price Jaimes, CARMELO  09/16/20 4936

## 2020-09-17 NOTE — ED PROVIDER NOTES
[address] HANDOFF       Handoff taken on the following patient from prior Attending Physician:  Pt Name: Maia Min FINN  PCP:  Lizette Galindo MD    Attestation  I was available and discussed any additional care issues that arose and coordinated the management plans with the resident(s) caring for the patient during my duty period. Any areas of disagreement with resident's documentation of care or procedures are noted on the chart. I was personally present for the key portions of any/all procedures during my duty period. I have documented in the chart those procedures where I was not present during the key portions. CHIEF COMPLAINT       Chief Complaint   Patient presents with    Abnormal Lab     sent to ED for abnormal renal labs         CURRENT MEDICATIONS     Previous Medications  Previous Medications    ACETAMINOPHEN (TYLENOL) 500 MG TABLET    Take 2 tablets by mouth every 6 hours as needed for Pain    ALCOHOL SWABS PADS    1 each by Does not apply route 4 times daily    ALLOPURINOL (ZYLOPRIM) 100 MG TABLET    Take 100 mg by mouth daily    ASPIRIN 81 MG TABLET    Take 81 mg by mouth nightly     BLOOD GLUCOSE MONITOR STRIPS    Test 4 times a day & as needed for symptoms of irregular blood glucose. Dispense sufficient amount for indicated testing frequency plus additional to accommodate PRN testing needs. CYCLOBENZAPRINE (FLEXERIL) 10 MG TABLET    Take 1 tablet by mouth 3 times daily as needed for Muscle spasms    FERROUS SULFATE (IRON 325) 325 (65 FE) MG TABLET    Take 1 tablet by mouth daily (with breakfast)    GABAPENTIN (NEURONTIN) 300 MG CAPSULE    Take 1 capsule by mouth 3 times daily for 7 days.     GLUCOSE MONITORING KIT (FREESTYLE) MONITORING KIT    1 kit by Does not apply route daily    HYDRALAZINE (APRESOLINE) 25 MG TABLET    Take 25 mg by mouth 3 times daily     INSULIN GLARGINE (LANTUS SOLOSTAR) 100 UNIT/ML INJECTION PEN    Inject 60 Units into the skin nightly    INSULIN LISPRO, 1 UNIT DIAL, (HUMALOG KWIKPEN) 100 UNIT/ML SOPN    Inject 0-6 Units into the skin 3 times daily (before meals) **Corrective Low Dose Algorithm**  Glucose: Dose:               No Insulin  140-199 1 Unit  200-249 2 Units  250-299 3 Units  300-349 4 Units  350-399 5 Units  Over 399 6 Units    INSULIN PEN NEEDLE ([employer] PEN NEEDLES) 31G X 6 MM MISC    1 each by Does not apply route daily    LANCETS MISC    1 each by Does not apply route 3 times daily    MELATONIN 10 MG TABS    Take 10 mg by mouth nightly as needed (SLEEP)    METOPROLOL (LOPRESSOR) 50 MG TABLET    Take 50 mg by mouth 2 times daily    PANTOPRAZOLE (PROTONIX) 40 MG TABLET    Take 1 tablet by mouth daily    PRAVASTATIN (PRAVACHOL) 20 MG TABLET    Take 20 mg by mouth daily    TRAMADOL (ULTRAM) 50 MG TABLET    Take 100 mg by mouth every 6 hours as needed for Pain.     VITAMIN C (ASCORBIC ACID) 500 MG TABLET    Take 500 mg by mouth daily    ZOLPIDEM (AMBIEN) 10 MG TABLET    Take by mouth nightly as needed for Sleep       Encounter Medications  Orders Placed This Encounter   Medications    0.9 % sodium chloride bolus    DISCONTD: 0.9 % sodium chloride bolus    acetaminophen (TYLENOL) tablet 1,000 mg    aspirin chewable tablet 81 mg    cyclobenzaprine (FLEXERIL) tablet 10 mg    ferrous sulfate (FE TABS 325) EC tablet 325 mg    gabapentin (NEURONTIN) capsule 300 mg    hydrALAZINE (APRESOLINE) tablet 25 mg    melatonin tablet 10 mg    metoprolol tartrate (LOPRESSOR) tablet 50 mg    pantoprazole (PROTONIX) tablet 40 mg    pravastatin (PRAVACHOL) tablet 20 mg    zolpidem (AMBIEN) tablet 5 mg    sodium chloride 0.9 % 1,000 mL infusion    ondansetron (ZOFRAN) injection 4 mg    heparin (porcine) injection 5,000 Units    OR Linked Order Group     acetaminophen (TYLENOL) tablet 650 mg     acetaminophen (TYLENOL) suppository 650 mg    polyethylene glycol (GLYCOLAX) packet 17 g    OR Linked Order Group     promethazine 10.8 (*)     Hematocrit 37.9 (*)     RDW 14.8 (*)     Lymphocytes 18 (*)     Eosinophils % 6 (*)     Immature Granulocytes 3 (*)     Absolute Eos # 0.61 (*)     All other components within normal limits   POC GLUCOSE FINGERSTICK - Abnormal; Notable for the following components:    POC Glucose 70 (*)     All other components within normal limits   POC GLUCOSE FINGERSTICK - Abnormal; Notable for the following components:    POC Glucose 63 (*)     All other components within normal limits   MAGNESIUM   POC GLUCOSE FINGERSTICK   POC GLUCOSE FINGERSTICK           PLAN/ TASKS OUTSTANDING       Boarding, awaiting bed for admission    (Please note that portions of this note were completed with a voice recognition program.  Efforts were made to edit the dictations but occasionally words are mis-transcribed. )    Castillo MD, F.A.C.E.P. Attending Emergency Physician        Rochelle Harding MD  09/17/20 9064    , from nephrology initially recommended IV bicarbonate for bicarbonate of 12. However on repeat his level increased to 18. He feels comfortable with this patient being discharged on oral bicarbonate, repeat labs with BMP in 1 week, follow-up in his office in 2 weeks, follow-up to PCP .            Rochelle Harding MD  09/17/20 0062

## 2020-09-17 NOTE — ED PROVIDER NOTES
[address] [facility]  Emergency Department  Emergency Medicine Resident Sign-out     Care of Florentino Cheng II was assumed from Dr. Lott and is being seen for Abnormal Lab (sent to ED for abnormal renal labs)  . The patient's initial evaluation and plan have been discussed with the prior provider who initially evaluated the patient.      EMERGENCY DEPARTMENT COURSE / MEDICAL DECISION MAKING:       MEDICATIONS GIVEN:  Orders Placed This Encounter   Medications    0.9 % sodium chloride bolus    DISCONTD: 0.9 % sodium chloride bolus    acetaminophen (TYLENOL) tablet 1,000 mg    aspirin chewable tablet 81 mg    cyclobenzaprine (FLEXERIL) tablet 10 mg    ferrous sulfate (FE TABS 325) EC tablet 325 mg    gabapentin (NEURONTIN) capsule 300 mg    hydrALAZINE (APRESOLINE) tablet 25 mg    melatonin tablet 10 mg    metoprolol tartrate (LOPRESSOR) tablet 50 mg    pantoprazole (PROTONIX) tablet 40 mg    pravastatin (PRAVACHOL) tablet 20 mg    zolpidem (AMBIEN) tablet 5 mg    sodium chloride 0.9 % 1,000 mL infusion    ondansetron (ZOFRAN) injection 4 mg    heparin (porcine) injection 5,000 Units    OR Linked Order Group     acetaminophen (TYLENOL) tablet 650 mg     acetaminophen (TYLENOL) suppository 650 mg    polyethylene glycol (GLYCOLAX) packet 17 g    OR Linked Order Group     promethazine (PHENERGAN) tablet 12.5 mg     ondansetron (ZOFRAN) injection 4 mg    glucose (GLUTOSE) 40 % oral gel 15 g    dextrose 50 % IV solution    glucagon (rDNA) injection 1 mg    dextrose 5 % solution    insulin lispro (HUMALOG) injection vial 0-12 Units    insulin lispro (HUMALOG) injection vial 0-6 Units       LABS / RADIOLOGY:     Labs Reviewed   CBC WITH AUTO DIFFERENTIAL - Abnormal; Notable for the following components:       Result Value    WBC 15.9 (*)     Hemoglobin 11.5 (*)     Hematocrit 37.8 (*)     RDW 14.9 (*)     Platelets 134 (*)     Seg Neutrophils 73 (*)     Lymphocytes 11 (*) Immature Granulocytes 3 (*)     Segs Absolute 11.68 (*)     Absolute Mono # 1.30 (*)     Absolute Eos # 0.55 (*)     Absolute Immature Granulocyte 0.50 (*)     All other components within normal limits   BASIC METABOLIC PANEL - Abnormal; Notable for the following components:    BUN 39 (*)     CREATININE 2.20 (*)     Sodium 133 (*)     CO2 18 (*)     GFR Non- 30 (*)     GFR  37 (*)     All other components within normal limits   URINALYSIS WITH MICROSCOPIC - Abnormal; Notable for the following components:    Urine Hgb TRACE (*)     Protein, UA TRACE (*)     Leukocyte Esterase, Urine TRACE (*)     All other components within normal limits   BASIC METABOLIC PANEL - Abnormal; Notable for the following components:    Glucose 69 (*)     BUN 33 (*)     CREATININE 2.01 (*)     Chloride 109 (*)     CO2 16 (*)     GFR Non- 34 (*)     GFR  41 (*)     All other components within normal limits   CBC WITH AUTO DIFFERENTIAL - Abnormal; Notable for the following components:    RBC 4.07 (*)     Hemoglobin 10.8 (*)     Hematocrit 37.9 (*)     RDW 14.8 (*)     Lymphocytes 18 (*)     Eosinophils % 6 (*)     Immature Granulocytes 3 (*)     Absolute Eos # 0.61 (*)     All other components within normal limits   POC GLUCOSE FINGERSTICK - Abnormal; Notable for the following components:    POC Glucose 70 (*)     All other components within normal limits   MAGNESIUM       Ct Abdomen Pelvis Wo Contrast Additional Contrast? Oral And Rectal    Result Date: 9/7/2020  EXAMINATION: CT OF THE ABDOMEN AND PELVIS WITHOUT CONTRAST 9/7/2020 11:39 am TECHNIQUE: CT of the abdomen and pelvis was performed without the administration of intravenous contrast. Multiplanar reformatted images are provided for review. Dose modulation, iterative reconstruction, and/or weight based adjustment of the mA/kV was utilized to reduce the radiation dose to as low as reasonably achievable.  COMPARISON: 08/31/2020 HISTORY: ORDERING SYSTEM PROVIDED HISTORY: s/p sbr and partial colectomy. Increased wbc, and tachycardia. R/o Leak vs abscess. TECHNOLOGIST PROVIDED HISTORY: s/p sbr and partial colectomy. Increased wbc, and tachycardia. R/o Leak vs abscess. FINDINGS: LIMITED EVALUATION IN THE ABSENCE OF IV CONTRAST. Lower Chest: Small left pleural effusion and subsegmental atelectasis. Trace right pleural effusion and a smaller subsegmental atelectasis. Inferior lingular subsegmental atelectasis. Organs: The liver, spleen, pancreas and gallbladder show no significant abnormalities. There is some fullness of the left adrenal gland. Double pigtail right ureteric stent has been placed. No right hydronephrosis. No right nephrolithiasis or focal lesion although evaluation for soft tissue masses is limited in the absence of IV contrast. Interval left nephrectomy. GI/Bowel: Interval postsurgical changes of resection of small-bowel including small-bowel mass along with resection of portion of the descending colon. Primary anastomotic suture lines noted in the descending colon in the left lower quadrant and the small bowel in the left mid abdomen. There is contrast coursing through the areas of anastomosis. There is no subluxation of contrast to indicate anastomotic leakage. There some large amorphous filling defects in the sigmoid colon distal to the anastomotic site which could be due to combination of stool and blood products given recent surgery. Pelvis: Air in urinary bladder from recent instrumentation. No suspicious pelvic mass. Peritoneum/Retroperitoneum: Scattered areas of free intraperitoneal air consistent with the recent laparotomy. There is small volume free fluid and extensive peritoneal fat stranding left side of the abdomen attributed to the recent surgery. Some of this could be blood products. There is some fluid with air pockets at the left renal bed.   No discrete localized rim enhancing collection to approximately 4.8 x 6.9 x 9.9 cm. The mass infiltrates the mesentery and results in proximal small bowel obstruction. There is a small middle pole left renal cyst.  Nonobstructive lower pole calculus measuring 6 x 12 mm. GI/Bowel: Marked gastric and duodenal distention. There is proximal small-bowel obstruction due to the infiltrative lower pole left renal mass. The distal small bowel is nondistended. No pericolonic inflammatory changes. Pelvis: Trace free pelvic fluid. No pelvic mass is identified. The prostate is not enlarged. Partial distention of the urinary bladder. Peritoneum/Retroperitoneum: The abdominal aorta is normal in caliber. 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 No pathologic retroperitoneal adenopathy. Bones/Soft Tissues: No acute osseous or soft tissue abnormality. No lytic or blastic osseous lesions. Multilevel degenerative changes in the lower thoracic and lumbar spine. 1. Proximal small-bowel obstruction due to extension of an infiltrative lower pole left renal mass to the mesentery and adjacent small bowel. There is marked gastric distension. 2. Infiltrative lower pole left renal mass, presumed malignant measuring 4.8 x 6.9 x 9.9 cm. Nonobstructive left nephrolithiasis. 3. Trace free pelvic fluid. Findings were discussed with Tiffany Esqueda at 3:37 pm on 8/31/2020. Xr Abdomen (kub) (single Ap View)    Addendum Date: 9/1/2020    ADDENDUM: Dose area product 1037.1 mGy per cm squared and fluoro time 5.6 seconds     Result Date: 9/1/2020  EXAMINATION: ONE SUPINE XRAY VIEW(S) OF THE ABDOMEN 9/1/2020 9:32 pm COMPARISON: 16 hours ago HISTORY: ORDERING SYSTEM PROVIDED HISTORY: cysto with right stent TECHNOLOGIST PROVIDED HISTORY: cysto with right stent Reason for Exam: cysto Acuity: Acute Type of Exam: Initial FINDINGS: Double-J ureteral stent present. On the contralateral side there appears to be a nephrolith.      Please correlate with clinical service Xr Acute Abd Series Chest 1 Vw    Result Date: 8/31/2020  EXAMINATION: TWO XRAY VIEWS OF THE ABDOMEN AND SINGLE  XRAY VIEW OF THE CHEST 8/31/2020 2:25 pm COMPARISON: None. HISTORY: ORDERING SYSTEM PROVIDED HISTORY: Pain TECHNOLOGIST PROVIDED HISTORY: Pain Reason for Exam: pt states vomiting and abdomen pain x 2 days Acuity: Acute Type of Exam: Initial FINDINGS: The cardiomediastinal silhouette is unremarkable. The lungs are clear. No infiltrate, pleural fluid or focal process is seen. Moderate aortic tortuosity, particularly involving the ascending aorta. 5 x 11 mm calcification on the left at the L3 vertebral level, possibly within the left renal pelvis or proximal left ureter. No other plain film evidence of renal or ureteral calculi. Bowel gas pattern is nonspecific. No focally distended loops, air-fluid levels or free air. Mild gastric distension. Multilevel osteoarthritic spurring in the lumbar spine. No acute cardiopulmonary disease. 5 x 11 mm calcification, possibly a renal or proximal ureteral calculus. Nonspecific bowel-gas pattern. Us Renal Complete    Result Date: 9/16/2020  EXAMINATION: RETROPERITONEAL ULTRASOUND OF THE KIDNEYS AND URINARY BLADDER 9/16/2020 COMPARISON: CT abdomen and pelvis September 7, 2020 HISTORY: ORDERING SYSTEM PROVIDED HISTORY: DEBBI, solitary right kidney, known right ureteral stent TECHNOLOGIST PROVIDED HISTORY: DEBBI, solitary right kidney, known right ureteral stent FINDINGS: Kidneys: The right kidney measures 11.7 cm in length and the left kidney is not visualized. Kidney demonstrate normal cortical echogenicity. No evidence of hydronephrosis or intrarenal stones. Right upper quadrant ascites. Bladder: Unremarkable appearance of the bladder. No significant post void residual. Stent noted in the bladder. Left kidney not visualized. Stent noted in the bladder.      Xr Chest Portable    Result Date: 9/11/2020  EXAMINATION: ONE XRAY VIEW OF THE CHEST 9/11/2020 9:54 am COMPARISON: Chest radiograph performed 09/03/2020. HISTORY: ORDERING SYSTEM PROVIDED HISTORY: Elevated WBCs. ? aspiration TECHNOLOGIST PROVIDED HISTORY: Elevated WBCs. ? aspiration FINDINGS: There is a left basilar effusion with adjacent infiltrate. There is no pneumothorax. The mediastinal structures are unremarkable. The upper abdomen unremarkable. The extrathoracic soft tissues are unremarkable. There is a right-sided PICC line with the tip in the mid SVC. Left basilar effusion with adjacent infiltrate representing atelectasis versus pneumonia. Right-sided PICC line with the tip in the mid SVC. Xr Chest Portable    Result Date: 9/3/2020  EXAMINATION: ONE XRAY VIEW OF THE CHEST 9/3/2020 6:17 am COMPARISON: September 1, 2020, chest exam HISTORY: ORDERING SYSTEM PROVIDED HISTORY: sepsis TECHNOLOGIST PROVIDED HISTORY: sepsis Reason for Exam: supine port Acuity: Unknown Type of Exam: Unknown FINDINGS: Interval insertion of ETT, tip is projected 3.6 cm cranial to the cecily Stable normal cardiopericardial silhouette Expiratory exam with interval increase in now mild streaky bibasilar atelectasis Degenerative changes of the thoracic spine/left shoulder     Line placement as above Expiratory exam with interval increase in now mild streaky bibasilar atelectasis     Xr Chest Portable    Result Date: 9/1/2020  EXAMINATION: ONE XRAY VIEW OF THE CHEST 9/1/2020 8:31 pm COMPARISON: 05/14/2010 HISTORY: ORDERING SYSTEM PROVIDED HISTORY: evaluation TECHNOLOGIST PROVIDED HISTORY: evaluation Reason for Exam: evaluation Acuity: Acute Type of Exam: Initial FINDINGS: Cardiomediastinal silhouette is unchanged in size. No pulmonary consolidation, pleural effusion, or pneumothorax. No acute osseous abnormality. Enteric tube is below the diaphragm with tip outside the field of view. No acute cardiopulmonary abnormality.      Ct Hip Right Wo Contrast    Result Date: 8/21/2020  EXAMINATION: CT OF THE RIGHT HIP which is incompletely visualized. This could simply be volume averaging at the inferior aspect of the left kidney, but other mass lesions are not excluded. This can also be evaluated with CT imaging of the abdomen and pelvis. Xr Abdomen For Ng/og/ne Tube Placement    Result Date: 9/2/2020  EXAMINATION: ONE SUPINE XRAY VIEW(S) OF THE ABDOMEN 9/2/2020 9:16 pm COMPARISON: 09/01/2020 HISTORY: ORDERING SYSTEM PROVIDED HISTORY: Confirmation of course of NG/OG/NE tube and location of tip of tube TECHNOLOGIST PROVIDED HISTORY: Confirmation of course of NG/OG/NE tube and location of tip of tube Portable? ->Yes Reason for Exam: ng placement FINDINGS: Enteric tube terminates at the GE junction. Side port is in the distal thoracic esophagus. Postoperative changes in the left upper quadrant. Nonobstructive bowel gas pattern. Right ureteral stent is partially visualized. Enteric tube terminates at the GE junction. Recommend advancement prior to use. Xr Abdomen For Ng/og/ne Tube Placement    Result Date: 9/1/2020  EXAMINATION: ONE SUPINE XRAY VIEW(S) OF THE ABDOMEN 9/1/2020 6:26 am COMPARISON: 08/31/2020 HISTORY: ORDERING SYSTEM PROVIDED HISTORY: Confirmation of course of NG/OG/NE tube and location of tip of tube TECHNOLOGIST PROVIDED HISTORY: Confirmation of course of NG/OG/NE tube and location of tip of tube Portable? ->Yes Reason for Exam: ng placement Acuity: Unknown Type of Exam: Unknown FINDINGS: The nasogastric tube proximal side port is in the proximal stomach. The distal tip is in the mid gastric body. There is no evidence of bowel obstruction. There is a stable left renal calculus. No acute osseous abnormality is seen. The distal end of the nasogastric tube is in the mid gastric body. Fluoro For Surgical Procedures    Result Date: 9/1/2020  Radiology exam is complete. No Radiologist dictation. Please follow up with ordering provider.      Xr Abdomen (2 Views)    Result Date: 9/7/2020  EXAMINATION: TWO XRAY VIEWS OF THE ABDOMEN 9/7/2020 9:02 am COMPARISON: 09/02/2020 HISTORY: ORDERING SYSTEM PROVIDED HISTORY: abd distention TECHNOLOGIST PROVIDED HISTORY: abd distention Reason for Exam: port supine and uprt abd Type of Exam: Subsequent/Follow-up FINDINGS: Paucity of small bowel gas. No small bowel distension. Gas is noted throughout the colon and also in the rectum. No suspicious masses. Right ureteric stent on prior has been removed and skin staples also no longer present. Bones grossly intact. Normal bowel gas pattern. RECENT VITALS:     Temp: 97.9 °F (36.6 °C),  Pulse: 120, Resp: 14, BP: 120/82, SpO2: 100 %      This patient is a 56 y.o. Male with DEBBI on CKD. ED Course as of Sep 17 0736   Wed Sep 16, 2020   1508 Previous WBC was 18 when patient had infection, has been improving, afebrile, no other signs to suggest infection at this time. Will run UA. Spoke with nephrology who advised normal saline and will see tomorrow, urology agrees with plan and will see tomorrow. WBC(!): 15.9 [SF]   Thu Sep 17, 2020   0728 FM admit, multiple signout patient, admitted for DEBBI on his CKD. Nephro following.     [RB]      ED Course User Index  [RB] Chayito Valentine DO  [SF] Jeramy West DO       OUTSTANDING TASKS / RECOMMENDATIONS:    1. boaridng      FINAL IMPRESSION:     1.  DEBBI (acute kidney injury) ([facility] [address]        DISPOSITION:         DISPOSITION:  []  Discharge   []  Transfer -    []  Admission -     []  Against Medical Advice   []  Eloped   FOLLOW-UP: Steve Pabon [address] [address] [phone]           DISCHARGE MEDICATIONS: New Prescriptions    No medications on file          DO Dr. Renard Hoff, [facility] Resident PGY - [id number] [facility] [address]        Chayito Valentine, [address] [place] [address]  Resident  09/17/20 [id number]

## 2020-09-17 NOTE — ED NOTES
Report received, pt ambulating to RR with steady gait, room cleaned and urinal emptied.       [address] [facility]  09/17/20 [id number]

## 2020-09-17 NOTE — ED NOTES
Pt. Sleeping in cot, resp. Even and non labored, NAD noted. Will continue to monitor.       Lennox Rosser, RN  09/17/20 4001

## 2020-09-17 NOTE — ED NOTES
Writer spoke w/ nephrology wants repeat labs and to be called w/ results. Writer to perfect serve Dr. Ezra Higuera for orders.       Joseph Shipley RN  09/17/20 6855

## 2020-09-17 NOTE — ED NOTES
Pt. Resting in cot, NAD noted, resp. Even and non labored. Wife at bedside. Pt. Asking for update.  From nephrology      Darryl Greer RN  09/17/20 0812

## 2020-09-17 NOTE — ED PROVIDER NOTES
Faculty Sign-Out Attestation  Handoff taken on the following patient from prior Attending Physician: Lucero Villalta was available and discussed any additional care issues that arose and coordinated the management plans with the resident(s) caring for the patient during my duty period. Any areas of disagreement with residents documentation of care or procedures are noted on the chart. I was personally present for the key portions of any/all procedures during my duty period. I have documented in the chart those procedures where I was not present during the keller portions.     Laz, radical nephrectomy, dark urine, incision stable, admitted    Gallo Chance DO  Attending Physician     Gallo Chance,   09/16/20 628 Lalito Cormier DO  09/16/20 0963

## 2020-09-17 NOTE — ED NOTES
Pt updated on plan of care, pt requesting to go home and rest in his own bed if he is to continue to be boarding in the ER.       [address] [facility], [address] [place] [address]  09/17/20 9286

## 2020-09-17 NOTE — PROGRESS NOTES
Eliane Burks, Christal [place], Mitchell Denise, Tino Usama  Urology Progress Note     Subjective:   No acute events overnight  Denies fevers, chills, chest pain, shortness of breath  Voiding well   Staples removed by general surgery yesterday    Patient Vitals for the past 24 hrs:   BP Temp Temp src Pulse Resp SpO2 Height Weight   09/17/20 0601 120/82 -- -- 120 14 100 % -- --   09/17/20 0530 125/81 -- -- 131 15 -- -- --   09/17/20 0330 113/80 -- -- 116 16 97 % -- --   09/17/20 0301 116/75 -- -- 116 13 97 % -- --   09/17/20 0154 -- -- -- 109 10 99 % -- --   09/17/20 0008 -- -- -- 114 10 100 % -- --   09/16/20 2201 103/74 -- -- 117 14 99 % -- --   09/16/20 2150 110/80 -- -- 117 14 99 % -- --   09/16/20 2149 (!) 86/58 -- -- 117 16 98 % -- --   09/16/20 2146 -- -- -- 117 -- -- -- --   09/16/20 1807 110/77 -- -- 105 18 100 % -- --   09/16/20 1745 106/77 -- -- 103 13 100 % -- --   09/16/20 1601 115/80 -- -- 99 13 98 % -- --   09/16/20 1501 110/77 -- -- 101 11 100 % -- --   09/16/20 1401 107/73 -- -- 106 16 100 % -- --   09/16/20 1300 -- -- -- 106 -- -- -- --   09/16/20 1249 102/72 97.9 °F (36.6 °C) Oral 110 17 100 % 5' 11\" (1.803 m) 255 lb (115.7 kg)     No intake or output data in the 24 hours ending 09/17/20 0631    Recent Labs     09/16/20  1258 09/17/20  0600   WBC 15.9* 9.5   HGB 11.5* 10.8*   HCT 37.8* 37.9*   MCV 89.8 93.1   * 443     Recent Labs     09/16/20  1258 09/17/20  0600   * 136   K 5.3 5.1    109*   CO2 18* 16*   BUN 39* 33*   CREATININE 2.20* 2.01*       Recent Labs     09/17/20  0608   COLORU YELLOW   PHUR 5.0   WBCUA 5 TO 10   RBCUA 2 TO 5   MUCUS NOT REPORTED   TRICHOMONAS NOT REPORTED   YEAST NOT REPORTED   BACTERIA NOT REPORTED   SPECGRAV 1.009   LEUKOCYTESUR TRACE*   UROBILINOGEN Normal   BILIRUBINUR NEGATIVE       Additional Lab/culture results:    Physical Exam:   AAOx3  NAD  Unlabored breathing  Tachycardia  Abdomen soft, nontender, nondistended  Midline incision healing with gaps between staples granulated in  G tube in place - clamped, clean drain sponge  No calf tenderness to palpation      Interval Imaging Findings:  Us Renal Complete    Result Date: 9/16/2020  EXAMINATION: RETROPERITONEAL ULTRASOUND OF THE KIDNEYS AND URINARY BLADDER 9/16/2020 COMPARISON: CT abdomen and pelvis September 7, 2020 HISTORY: ORDERING SYSTEM PROVIDED HISTORY: DEBBI, solitary right kidney, known right ureteral stent TECHNOLOGIST PROVIDED HISTORY: DEBBI, solitary right kidney, known right ureteral stent FINDINGS: Kidneys: The right kidney measures 11.7 cm in length and the left kidney is not visualized. Kidney demonstrate normal cortical echogenicity. No evidence of hydronephrosis or intrarenal stones. Right upper quadrant ascites. Bladder: Unremarkable appearance of the bladder. No significant post void residual. Stent noted in the bladder. Left kidney not visualized. Stent noted in the bladder.      Impression:    - S/P Laparoscopic robotic left nephrectomy and exploratory laparotomy with bowel resection of proximal jejunum and descending colon 9/2/20  - Acute blood loss anemia - hgb 11.5 today  - acute kidney injury on CKD stage IIIA (baseline Cr 1.4)  - right distal ureteral stone s/p cystoscopy and right ureteral stent placement by Dr Anival Cedillo 9/1/20  - platelet dysfunction on aspirin 81 mg  - comorbid conditions: diabetes mellitus type 2, HTN, obesity and chronic kidney disease.  - Pathology: Negative for malignancy, Left Kidney with xanthogranulomatous pyelonephritis  Leukocytosis - resolving    Plan:   Appreciate nephrology recs for DEBBI and now solitary kidney  Cr 2.0 from 2.2  Renal US - right kidney WNL  Appreciate surgery input  Stone management on outpatient basis with Dr Moe Haji in next 4-6 weeks  Please call with any questions    Tayo Palma  6:31 AM 9/17/2020

## 2020-09-17 NOTE — ED NOTES
Nephrology paged to discuss lab results. Awaiting call back.       Elizabeth Johnson RN  09/17/20 7439

## 2020-09-17 NOTE — ED NOTES
Pt. Still awaiting tramadol order and to be seen by nephrology.       Del Newman, CARMELO  09/17/20 2424

## 2020-09-17 NOTE — PROGRESS NOTES
My name was accidentally assigned to this chart. I did not provide any care for this patient or see them during their hospitalization.     Ruy Carter, DO  PGY 1

## 2020-09-17 NOTE — ED NOTES
Pt given hospital bed and pillow for comfort, NAD, no other needs at this time  Awaiting medication from pharmacy to finish evening meds     Rosy Mistry RN  09/16/20 2057

## 2020-09-17 NOTE — ED NOTES
Received report from Julio Cesar [facility] [address] , [facility]  09/17/20 9826

## 2020-09-17 NOTE — ED NOTES
Morning labs sent  6am meds given  Pt breakfast tray ordered  No other needs at this time  Pt resting comfortably, ALENA Nelson, CARMELO  09/17/20 6146

## 2020-09-18 NOTE — ED NOTES
Writer spoke with [employer] to complete pt discharge per Dr. Dina Aguilera. [address], states she has never discharged a pt per verbal orders from Physician and had was not sure how to go about getting the pt discharge. Writer informed House Supervisor that she will speak to ED attending and figure out how to get the pt home.       [address] [place] [address]  09/18/20 8114

## 2020-09-18 NOTE — ED NOTES
Pt stable upon discharge and taken to lobby, assisted to vehicle with family member to take pt home.       [address] [place] [address]  09/18/20 0076

## 2020-09-18 NOTE — ED NOTES
Writer spoke with Dr. Esteban Lin on updated BMP, Dr. Esteban Lin gave writer verbal orders to discharge pt from a nephrology standpoint with orders to follow up in two weeks at his office, repeat BMP in one week and pt to be discharge with sodium bicarb P.O, B.I.D. Dr. Esteban Lin unable to change pt status to discharge, writer to contact admitting team for discharge paperwork with follow up orders. Pt update on plan of care.       [address] [place] [address], [address] [place] [address]  09/18/20 9194

## 2020-09-18 NOTE — ED PROVIDER NOTES
[facility] [address] [facility]  Emergency Department  Emergency Medicine Resident Sign-out     Care of Isreal Fofana II was assumed from Dr. Gisselle Casper and is being seen for Abnormal Lab (sent to ED for abnormal renal labs)  . The patient's initial evaluation and plan have been discussed with the prior provider who initially evaluated the patient.      EMERGENCY DEPARTMENT COURSE / MEDICAL DECISION MAKING:       MEDICATIONS GIVEN:  Orders Placed This Encounter   Medications    0.9 % sodium chloride bolus    DISCONTD: 0.9 % sodium chloride bolus    DISCONTD: acetaminophen (TYLENOL) tablet 1,000 mg    DISCONTD: aspirin chewable tablet 81 mg    DISCONTD: cyclobenzaprine (FLEXERIL) tablet 10 mg    DISCONTD: ferrous sulfate (FE TABS 325) EC tablet 325 mg    DISCONTD: gabapentin (NEURONTIN) capsule 300 mg    DISCONTD: hydrALAZINE (APRESOLINE) tablet 25 mg    DISCONTD: melatonin tablet 10 mg    DISCONTD: metoprolol tartrate (LOPRESSOR) tablet 50 mg    DISCONTD: pantoprazole (PROTONIX) tablet 40 mg    DISCONTD: pravastatin (PRAVACHOL) tablet 20 mg    DISCONTD: zolpidem (AMBIEN) tablet 5 mg    DISCONTD: sodium chloride 0.9 % 1,000 mL infusion    DISCONTD: ondansetron (ZOFRAN) injection 4 mg    DISCONTD: heparin (porcine) injection 5,000 Units    DISCONTD: acetaminophen (TYLENOL) tablet 650 mg    DISCONTD: acetaminophen (TYLENOL) suppository 650 mg    DISCONTD: polyethylene glycol (GLYCOLAX) packet 17 g    DISCONTD: promethazine (PHENERGAN) tablet 12.5 mg    DISCONTD: ondansetron (ZOFRAN) injection 4 mg    DISCONTD: glucose (GLUTOSE) 40 % oral gel 15 g    DISCONTD: dextrose 50 % IV solution    DISCONTD: glucagon (rDNA) injection 1 mg    DISCONTD: dextrose 5 % solution    DISCONTD: insulin lispro (HUMALOG) injection vial 0-12 Units    DISCONTD: insulin lispro (HUMALOG) injection vial 0-6 Units    traMADol (ULTRAM) tablet 50 mg    DISCONTD: sodium bicarbonate 150 mEq in dextrose 5 % 1,000 mL infusion    DISCONTD: sodium bicarbonate 150 mEq in dextrose 5 % 1,000 mL infusion    sodium bicarbonate 650 MG tablet     Sig: Take 1 tablet by mouth 2 times daily     Dispense:  100 tablet     Refill:  0       LABS / RADIOLOGY:     Labs Reviewed   CBC WITH AUTO DIFFERENTIAL - Abnormal; Notable for the following components:       Result Value    WBC 15.9 (*)     Hemoglobin 11.5 (*)     Hematocrit 37.8 (*)     RDW 14.9 (*)     Platelets 935 (*)     Seg Neutrophils 73 (*)     Lymphocytes 11 (*)     Immature Granulocytes 3 (*)     Segs Absolute 11.68 (*)     Absolute Mono # 1.30 (*)     Absolute Eos # 0.55 (*)     Absolute Immature Granulocyte 0.50 (*)     All other components within normal limits   BASIC METABOLIC PANEL - Abnormal; Notable for the following components:    BUN 39 (*)     CREATININE 2.20 (*)     Sodium 133 (*)     CO2 18 (*)     GFR Non- 30 (*)     GFR  37 (*)     All other components within normal limits   URINALYSIS WITH MICROSCOPIC - Abnormal; Notable for the following components:    Urine Hgb TRACE (*)     Protein, UA TRACE (*)     Leukocyte Esterase, Urine TRACE (*)     All other components within normal limits   BASIC METABOLIC PANEL - Abnormal; Notable for the following components:    Glucose 69 (*)     BUN 33 (*)     CREATININE 2.01 (*)     Chloride 109 (*)     CO2 16 (*)     GFR Non- 34 (*)     GFR  41 (*)     All other components within normal limits   CBC WITH AUTO DIFFERENTIAL - Abnormal; Notable for the following components:    RBC 4.07 (*)     Hemoglobin 10.8 (*)     Hematocrit 37.9 (*)     RDW 14.8 (*)     Lymphocytes 18 (*)     Eosinophils % 6 (*)     Immature Granulocytes 3 (*)     Absolute Eos # 0.61 (*)     All other components within normal limits   BASIC METABOLIC PANEL - Abnormal; Notable for the following components:    BUN 26 (*)     CREATININE 1.49 (*)     Calcium 7.7 (*)     Sodium 131 (*)     Potassium 5.6 (*) Chloride 111 (*)     CO2 12 (*)     Anion Gap 8 (*)     GFR Non- 48 (*)     GFR  58 (*)     All other components within normal limits   URINALYSIS - Abnormal; Notable for the following components:    Protein, UA TRACE (*)     Leukocyte Esterase, Urine TRACE (*)     All other components within normal limits   BASIC METABOLIC PANEL W/ REFLEX TO MG FOR LOW K - Abnormal; Notable for the following components:    BUN 25 (*)     CREATININE 1.81 (*)     Chloride 108 (*)     CO2 18 (*)     GFR Non- 38 (*)     GFR  46 (*)     All other components within normal limits   POC GLUCOSE FINGERSTICK - Abnormal; Notable for the following components:    POC Glucose 70 (*)     All other components within normal limits   POC GLUCOSE FINGERSTICK - Abnormal; Notable for the following components:    POC Glucose 63 (*)     All other components within normal limits   MAGNESIUM   MICROSCOPIC URINALYSIS   POC GLUCOSE FINGERSTICK   POC GLUCOSE FINGERSTICK   POC GLUCOSE FINGERSTICK       Ct Abdomen Pelvis Wo Contrast Additional Contrast? Oral And Rectal    Result Date: 9/7/2020  EXAMINATION: CT OF THE ABDOMEN AND PELVIS WITHOUT CONTRAST 9/7/2020 11:39 am TECHNIQUE: CT of the abdomen and pelvis was performed without the administration of intravenous contrast. Multiplanar reformatted images are provided for review. Dose modulation, iterative reconstruction, and/or weight based adjustment of the mA/kV was utilized to reduce the radiation dose to as low as reasonably achievable. COMPARISON: 08/31/2020 HISTORY: ORDERING SYSTEM PROVIDED HISTORY: s/p sbr and partial colectomy. Increased wbc, and tachycardia. R/o Leak vs abscess. TECHNOLOGIST PROVIDED HISTORY: s/p sbr and partial colectomy. Increased wbc, and tachycardia. R/o Leak vs abscess. FINDINGS: LIMITED EVALUATION IN THE ABSENCE OF IV CONTRAST. Lower Chest: Small left pleural effusion and subsegmental atelectasis.   Trace right pleural effusion and a smaller subsegmental atelectasis. Inferior lingular subsegmental atelectasis. Organs: The liver, spleen, pancreas and gallbladder show no significant abnormalities. There is some fullness of the left adrenal gland. Double pigtail right ureteric stent has been placed. No right hydronephrosis. No right nephrolithiasis or focal lesion although evaluation for soft tissue masses is limited in the absence of IV contrast. Interval left nephrectomy. GI/Bowel: Interval postsurgical changes of resection of small-bowel including small-bowel mass along with resection of portion of the descending colon. Primary anastomotic suture lines noted in the descending colon in the left lower quadrant and the small bowel in the left mid abdomen. There is contrast coursing through the areas of anastomosis. There is no subluxation of contrast to indicate anastomotic leakage. There some large amorphous filling defects in the sigmoid colon distal to the anastomotic site which could be due to combination of stool and blood products given recent surgery. Pelvis: Air in urinary bladder from recent instrumentation. No suspicious pelvic mass. Peritoneum/Retroperitoneum: Scattered areas of free intraperitoneal air consistent with the recent laparotomy. There is small volume free fluid and extensive peritoneal fat stranding left side of the abdomen attributed to the recent surgery. Some of this could be blood products. There is some fluid with air pockets at the left renal bed. No discrete localized rim enhancing collection to suggest abscess formation. Bones/Soft Tissues: Percutaneous gastrostomy tube in place. Laparotomy incision with overlying skin staples noted. Bones grossly intact. 1. Interval left nephrectomy and small bowel and descending colon resection with primary anastomosis. No extravasation of administered oral contrast from the bowel to indicate bowel anastomotic leakage.  2. Small volume free intraperitoneal fluid, extensive left peritoneal reticulations and stranding and scattered pockets of free intraperitoneal air consistent with recent laparotomy. 3. Large filling defects in the sigmoid colon downstream from the colonic anastomosis probably combination of stool and blood products 4. Percutaneous gastrostomy tube in place. 5. Small left pleural effusion and subsegmental atelectasis. Trace right pleural effusion and subsegmental atelectasis. Ct Abdomen Pelvis Wo Contrast    Result Date: 9/1/2020  EXAMINATION: CT OF THE ABDOMEN AND PELVIS WITHOUT CONTRAST 8/31/2020 3:20 pm TECHNIQUE: CT of the abdomen and pelvis was performed without the administration of intravenous contrast. Multiplanar reformatted images are provided for review. Dose modulation, iterative reconstruction, and/or weight based adjustment of the mA/kV was utilized to reduce the radiation dose to as low as reasonably achievable. COMPARISON: 8/21/2020 HISTORY: ORDERING SYSTEM PROVIDED HISTORY: Abdominal Pain TECHNOLOGIST PROVIDED HISTORY: Abdominal Pain Reason for Exam: Pt states vomiting since last night. Pt states known left renal mass to be removed in 2 days. Acuity: Acute Type of Exam: Initial FINDINGS: Lower Chest: No acute infiltrate at the lung bases. Organs: The unenhanced liver, spleen, pancreas and adrenal glands are unremarkable. No acute biliary findings. The right kidney is unremarkable. There is an infiltrative mass in the lower pole of the left kidney, previously partially visualized on CT of the right hip. This measures approximately 4.8 x 6.9 x 9.9 cm. The mass infiltrates the mesentery and results in proximal small bowel obstruction. There is a small middle pole left renal cyst.  Nonobstructive lower pole calculus measuring 6 x 12 mm. GI/Bowel: Marked gastric and duodenal distention. There is proximal small-bowel obstruction due to the infiltrative lower pole left renal mass.  The distal small bowel is nondistended. No pericolonic inflammatory changes. Pelvis: Trace free pelvic fluid. No pelvic mass is identified. The prostate is not enlarged. Partial distention of the urinary bladder. Peritoneum/Retroperitoneum: The abdominal aorta is normal in caliber. 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 No pathologic retroperitoneal adenopathy. Bones/Soft Tissues: No acute osseous or soft tissue abnormality. No lytic or blastic osseous lesions. Multilevel degenerative changes in the lower thoracic and lumbar spine. 1. Proximal small-bowel obstruction due to extension of an infiltrative lower pole left renal mass to the mesentery and adjacent small bowel. There is marked gastric distension. 2. Infiltrative lower pole left renal mass, presumed malignant measuring 4.8 x 6.9 x 9.9 cm. Nonobstructive left nephrolithiasis. 3. Trace free pelvic fluid. Findings were discussed with Emelia Paige at 3:37 pm on 8/31/2020. Xr Abdomen (kub) (single Ap View)    Addendum Date: 9/1/2020    ADDENDUM: Dose area product 1037.1 mGy per cm squared and fluoro time 5.6 seconds     Result Date: 9/1/2020  EXAMINATION: ONE SUPINE XRAY VIEW(S) OF THE ABDOMEN 9/1/2020 9:32 pm COMPARISON: 16 hours ago HISTORY: ORDERING SYSTEM PROVIDED HISTORY: cysto with right stent TECHNOLOGIST PROVIDED HISTORY: cysto with right stent Reason for Exam: cysto Acuity: Acute Type of Exam: Initial FINDINGS: Double-J ureteral stent present. On the contralateral side there appears to be a nephrolith. Please correlate with clinical service     Xr Acute Abd Series Chest 1 Vw    Result Date: 8/31/2020  EXAMINATION: TWO XRAY VIEWS OF THE ABDOMEN AND SINGLE  XRAY VIEW OF THE CHEST 8/31/2020 2:25 pm COMPARISON: None.  HISTORY: ORDERING SYSTEM PROVIDED HISTORY: Pain TECHNOLOGIST PROVIDED HISTORY: Pain Reason for Exam: pt states vomiting and abdomen pain x 2 days Acuity: Acute Type of Exam: Initial FINDINGS: The cardiomediastinal tissues are unremarkable. There is a right-sided PICC line with the tip in the mid SVC. Left basilar effusion with adjacent infiltrate representing atelectasis versus pneumonia. Right-sided PICC line with the tip in the mid SVC. Xr Chest Portable    Result Date: 9/3/2020  EXAMINATION: ONE XRAY VIEW OF THE CHEST 9/3/2020 6:17 am COMPARISON: September 1, 2020, chest exam HISTORY: ORDERING SYSTEM PROVIDED HISTORY: sepsis TECHNOLOGIST PROVIDED HISTORY: sepsis Reason for Exam: supine port Acuity: Unknown Type of Exam: Unknown FINDINGS: Interval insertion of ETT, tip is projected 3.6 cm cranial to the cecily Stable normal cardiopericardial silhouette Expiratory exam with interval increase in now mild streaky bibasilar atelectasis Degenerative changes of the thoracic spine/left shoulder     Line placement as above Expiratory exam with interval increase in now mild streaky bibasilar atelectasis     Xr Chest Portable    Result Date: 9/1/2020  EXAMINATION: ONE XRAY VIEW OF THE CHEST 9/1/2020 8:31 pm COMPARISON: 05/14/2010 HISTORY: ORDERING SYSTEM PROVIDED HISTORY: evaluation TECHNOLOGIST PROVIDED HISTORY: evaluation Reason for Exam: evaluation Acuity: Acute Type of Exam: Initial FINDINGS: Cardiomediastinal silhouette is unchanged in size. No pulmonary consolidation, pleural effusion, or pneumothorax. No acute osseous abnormality. Enteric tube is below the diaphragm with tip outside the field of view. No acute cardiopulmonary abnormality. Ct Hip Right Wo Contrast    Result Date: 8/21/2020  EXAMINATION: CT OF THE RIGHT HIP WITHOUT CONTRAST 8/21/2020 2:03 pm TECHNIQUE: CT of the right hip was performed without the administration of intravenous contrast.  Multiplanar reformatted images are provided for review. Dose modulation, iterative reconstruction, and/or weight based adjustment of the mA/kV was utilized to reduce the radiation dose to as low as reasonably achievable.  COMPARISON: Radiographs dated 08/10/2020 HISTORY ORDERING SYSTEM PROVIDED HISTORY: Pain of right hip joint FINDINGS: Bones: There is no acute fracture or dislocation. No suspicious osteolytic lesions. Soft Tissue:  Muscle attenuation is within normal limits. No evidence of soft tissue fluid collection or gas. On the superior most images acquired through the pelvis, there is a soft tissue density in the mid left abdomen, which is indeterminate. This could simply be volume averaging at the inferior tip of the left kidney, but other mass lesions are not excluded. There is a 0.3 x 0.5 cm calculus in the distal right ureter. Joint:  No evidence of joint effusion at the hips. There is mild joint space narrowing and marginal spurring at the right hip. Scattered mild subchondral cystic changes noted in the femoral head. No articular surface collapse. Small ossification is seen adjacent to the superior acetabulum, likely a labral ossification. There are moderate to severe degenerative changes at the sacroiliac joints, worse on the right. Severe facet arthropathy is seen in the visualized lower lumbar spine. 1.  No acute osseous abnormality in the right hip. 2.  Mild degenerative changes at the right hip. Prominent degenerative changes also noted in the sacroiliac joints and visualized lower lumbar spine. 3.  A 0.3 x 0.5 cm calculus is seen at the distal right ureter. Right kidney is not included in the field of view to assess for hydronephrosis. CT imaging of the abdomen and pelvis is recommended. 4.  Indeterminate soft tissue density in the mid left abdomen, which is incompletely visualized. This could simply be volume averaging at the inferior aspect of the left kidney, but other mass lesions are not excluded. This can also be evaluated with CT imaging of the abdomen and pelvis.      Xr Abdomen For Ng/og/ne Tube Placement    Result Date: 9/2/2020  EXAMINATION: ONE SUPINE XRAY VIEW(S) OF THE ABDOMEN 9/2/2020 9:16 pm COMPARISON: 09/01/2020 HISTORY: ORDERING SYSTEM PROVIDED HISTORY: Confirmation of course of NG/OG/NE tube and location of tip of tube TECHNOLOGIST PROVIDED HISTORY: Confirmation of course of NG/OG/NE tube and location of tip of tube Portable? ->Yes Reason for Exam: ng placement FINDINGS: Enteric tube terminates at the GE junction. Side port is in the distal thoracic esophagus. Postoperative changes in the left upper quadrant. Nonobstructive bowel gas pattern. Right ureteral stent is partially visualized. Enteric tube terminates at the GE junction. Recommend advancement prior to use. Xr Abdomen For Ng/og/ne Tube Placement    Result Date: 9/1/2020  EXAMINATION: ONE SUPINE XRAY VIEW(S) OF THE ABDOMEN 9/1/2020 6:26 am COMPARISON: 08/31/2020 HISTORY: ORDERING SYSTEM PROVIDED HISTORY: Confirmation of course of NG/OG/NE tube and location of tip of tube TECHNOLOGIST PROVIDED HISTORY: Confirmation of course of NG/OG/NE tube and location of tip of tube Portable? ->Yes Reason for Exam: ng placement Acuity: Unknown Type of Exam: Unknown FINDINGS: The nasogastric tube proximal side port is in the proximal stomach. The distal tip is in the mid gastric body. There is no evidence of bowel obstruction. There is a stable left renal calculus. No acute osseous abnormality is seen. The distal end of the nasogastric tube is in the mid gastric body. Fluoro For Surgical Procedures    Result Date: 9/1/2020  Radiology exam is complete. No Radiologist dictation. Please follow up with ordering provider. Xr Abdomen (2 Views)    Result Date: 9/7/2020  EXAMINATION: TWO XRAY VIEWS OF THE ABDOMEN 9/7/2020 9:02 am COMPARISON: 09/02/2020 HISTORY: ORDERING SYSTEM PROVIDED HISTORY: abd distention TECHNOLOGIST PROVIDED HISTORY: abd distention Reason for Exam: port supine and uprt abd Type of Exam: Subsequent/Follow-up FINDINGS: Paucity of small bowel gas. No small bowel distension. Gas is noted throughout the colon and also in the rectum.   No suspicious masses. Right ureteric stent on prior has been removed and skin staples also no longer present. Bones grossly intact. Normal bowel gas pattern. RECENT VITALS:     Temp: 97.9 °F (36.6 °C),  Pulse: 111, Resp: 12, BP: 137/88, SpO2: 98 %    ED Course as of Sep 18 1450   Wed Sep 16, 2020   1508 Previous WBC was 18 when patient had infection, has been improving, afebrile, no other signs to suggest infection at this time. Will run UA. Spoke with nephrology who advised normal saline and will see tomorrow, urology agrees with plan and will see tomorrow. WBC(!): 15.9 [SF]   Thu Sep 17, 2020   0728 FM admit, multiple signout patient, admitted for DEBBI on his CKD. Nephro following.     [RB]      ED Course User Index  [RB] Michelle Griffith DO  [SF] Joel Orozco DO       This patient is a 56 y.o. Male with DEBBI. Patient was admitted to Dr. Marcella Echeverria service. Nephrology is comfortable with oral bicarb and repeat labs in 1 week. Follow-up with Dr. Marcella Echeverria in office. Attending physician did discuss this with Dr. Marcella Echeverria who is agreeable to patient being discharged however Dr. Marcella Echeverria is unable to place discharge orders. Attending did place discharge orders and follow-up information. OUTSTANDING TASKS / RECOMMENDATIONS:    1. Discharged     FINAL IMPRESSION:     1. DEBBI (acute kidney injury) ([facility] [address]    2.  Metabolic acidosis        DISPOSITION:         DISPOSITION:  [x]  Discharge   []  Transfer -    []  Admission -     []  Against Medical Advice   []  Eloped   FOLLOW-UP: Moe Mccarthy [address] [address] [phone]    Schedule an appointment as soon as possible for a visit in 1 week      Moe Mccarthy [address] [address] [phone]          Evelyn Pulliam MD  [address] [place] [address] [phone]    Schedule an appointment as soon as possible for a visit in 1 week       DISCHARGE MEDICATIONS: Discharge Medication List as of 9/17/2020 10:53 PM START taking these medications    Details   sodium bicarbonate 650 MG tablet Take 1 tablet by mouth 2 times daily, Disp-100 tablet,R-0Print                Mili Griffin DO  Emergency Medicine Resident  [facility]       Mili Griffin [place]  Resident  09/18/20 [id number]

## 2020-09-18 NOTE — ED NOTES
Writer spoke with Dr. Loraine Peacock about orders for bicarb drip, writer given orders to repeated BMP and start bicarb drip with Dr. Loraine Peacock to put in orders. Writer was also given instructions to page Dr. Loraine Peacock with BMP results. Pt updated on plan of care. Liv Mckeon, [address] [place] [address]  09/18/20 [id number]

## 2020-09-22 ENCOUNTER — APPOINTMENT (OUTPATIENT)
Dept: CT IMAGING | Age: 60
DRG: 856 | End: 2020-09-22
Payer: COMMERCIAL

## 2020-09-22 ENCOUNTER — HOSPITAL ENCOUNTER (INPATIENT)
Age: 60
LOS: 10 days | Discharge: HOME HEALTH | DRG: 856 | End: 2020-10-02
Attending: EMERGENCY MEDICINE | Admitting: FAMILY MEDICINE
Payer: COMMERCIAL

## 2020-09-22 LAB
-: NORMAL
ABSOLUTE EOS #: 0.06 K/UL (ref 0–0.44)
ABSOLUTE IMMATURE GRANULOCYTE: 0.1 K/UL (ref 0–0.3)
ABSOLUTE LYMPH #: 0.94 K/UL (ref 1.1–3.7)
ABSOLUTE MONO #: 0.89 K/UL (ref 0.1–1.2)
ALBUMIN SERPL-MCNC: 3.1 G/DL (ref 3.5–5.2)
ALBUMIN/GLOBULIN RATIO: 0.6 (ref 1–2.5)
ALLEN TEST: ABNORMAL
ALP BLD-CCNC: 112 U/L (ref 40–129)
ALT SERPL-CCNC: 21 U/L (ref 5–41)
AMORPHOUS: NORMAL
ANION GAP SERPL CALCULATED.3IONS-SCNC: 14 MMOL/L (ref 9–17)
ANION GAP: 13 MMOL/L (ref 7–16)
AST SERPL-CCNC: 32 U/L
BACTERIA: NORMAL
BASOPHILS # BLD: 1 % (ref 0–2)
BASOPHILS ABSOLUTE: 0.09 K/UL (ref 0–0.2)
BILIRUB SERPL-MCNC: 0.47 MG/DL (ref 0.3–1.2)
BILIRUBIN URINE: NEGATIVE
BUN BLDV-MCNC: 16 MG/DL (ref 8–23)
BUN/CREAT BLD: ABNORMAL (ref 9–20)
CALCIUM SERPL-MCNC: 9 MG/DL (ref 8.6–10.4)
CASTS UA: NORMAL /LPF (ref 0–8)
CHLORIDE BLD-SCNC: 100 MMOL/L (ref 98–107)
CO2: 17 MMOL/L (ref 20–31)
COLOR: YELLOW
COMMENT UA: ABNORMAL
CREAT SERPL-MCNC: 1.63 MG/DL (ref 0.7–1.2)
CRYSTALS, UA: NORMAL /HPF
DIFFERENTIAL TYPE: ABNORMAL
EOSINOPHILS RELATIVE PERCENT: 0 % (ref 1–4)
EPITHELIAL CELLS UA: NORMAL /HPF (ref 0–5)
FIO2: ABNORMAL
GFR AFRICAN AMERICAN: 52 ML/MIN
GFR NON-AFRICAN AMERICAN: 43 ML/MIN
GFR NON-AFRICAN AMERICAN: 45 ML/MIN
GFR SERPL CREATININE-BSD FRML MDRD: 55 ML/MIN
GFR SERPL CREATININE-BSD FRML MDRD: ABNORMAL ML/MIN/{1.73_M2}
GLUCOSE BLD-MCNC: 201 MG/DL (ref 70–99)
GLUCOSE BLD-MCNC: 222 MG/DL (ref 74–100)
GLUCOSE URINE: NEGATIVE
HCO3 VENOUS: 20.2 MMOL/L (ref 22–29)
HCT VFR BLD CALC: 39.7 % (ref 40.7–50.3)
HEMOGLOBIN: 12.2 G/DL (ref 13–17)
IMMATURE GRANULOCYTES: 1 %
KETONES, URINE: NEGATIVE
LACTIC ACID, WHOLE BLOOD: 1.9 MMOL/L (ref 0.7–2.1)
LEUKOCYTE ESTERASE, URINE: ABNORMAL
LIPASE: 253 U/L (ref 13–60)
LYMPHOCYTES # BLD: 7 % (ref 24–43)
MCH RBC QN AUTO: 27.1 PG (ref 25.2–33.5)
MCHC RBC AUTO-ENTMCNC: 30.7 G/DL (ref 28.4–34.8)
MCV RBC AUTO: 88 FL (ref 82.6–102.9)
MODE: ABNORMAL
MONOCYTES # BLD: 7 % (ref 3–12)
MUCUS: NORMAL
NEGATIVE BASE EXCESS, VEN: 3 (ref 0–2)
NITRITE, URINE: NEGATIVE
NRBC AUTOMATED: 0 PER 100 WBC
O2 DEVICE/FLOW/%: ABNORMAL
O2 SAT, VEN: 77 % (ref 60–85)
OTHER OBSERVATIONS UA: NORMAL
PATIENT TEMP: ABNORMAL
PCO2, VEN: 29.4 MM HG (ref 41–51)
PDW BLD-RTO: 15 % (ref 11.8–14.4)
PH UA: 5 (ref 5–8)
PH VENOUS: 7.45 (ref 7.32–7.43)
PLATELET # BLD: 491 K/UL (ref 138–453)
PLATELET ESTIMATE: ABNORMAL
PMV BLD AUTO: 10.7 FL (ref 8.1–13.5)
PO2, VEN: 39.2 MM HG (ref 30–50)
POC CHLORIDE: 105 MMOL/L (ref 98–107)
POC CREATININE: 1.56 MG/DL (ref 0.51–1.19)
POC HEMATOCRIT: 41 % (ref 41–53)
POC HEMOGLOBIN: 14 G/DL (ref 13.5–17.5)
POC IONIZED CALCIUM: 1.25 MMOL/L (ref 1.15–1.33)
POC LACTIC ACID: 1.24 MMOL/L (ref 0.56–1.39)
POC PCO2 TEMP: ABNORMAL MM HG
POC PH TEMP: ABNORMAL
POC PO2 TEMP: ABNORMAL MM HG
POC POTASSIUM: 5.2 MMOL/L (ref 3.5–4.5)
POC SODIUM: 138 MMOL/L (ref 138–146)
POSITIVE BASE EXCESS, VEN: ABNORMAL (ref 0–3)
POTASSIUM SERPL-SCNC: 5.1 MMOL/L (ref 3.7–5.3)
PROTEIN UA: ABNORMAL
RBC # BLD: 4.51 M/UL (ref 4.21–5.77)
RBC # BLD: ABNORMAL 10*6/UL
RBC UA: NORMAL /HPF (ref 0–4)
RENAL EPITHELIAL, UA: NORMAL /HPF
SAMPLE SITE: ABNORMAL
SARS-COV-2, RAPID: NOT DETECTED
SARS-COV-2: NORMAL
SARS-COV-2: NORMAL
SEG NEUTROPHILS: 85 % (ref 36–65)
SEGMENTED NEUTROPHILS ABSOLUTE COUNT: 11.48 K/UL (ref 1.5–8.1)
SODIUM BLD-SCNC: 131 MMOL/L (ref 135–144)
SOURCE: NORMAL
SPECIFIC GRAVITY UA: 1.01 (ref 1–1.03)
TOTAL CO2, VENOUS: 21 MMOL/L (ref 23–30)
TOTAL PROTEIN: 8.4 G/DL (ref 6.4–8.3)
TRICHOMONAS: NORMAL
TURBIDITY: CLEAR
URINE HGB: NEGATIVE
UROBILINOGEN, URINE: NORMAL
WBC # BLD: 13.6 K/UL (ref 3.5–11.3)
WBC # BLD: ABNORMAL 10*3/UL
WBC UA: NORMAL /HPF (ref 0–5)
YEAST: NORMAL

## 2020-09-22 PROCEDURE — 83690 ASSAY OF LIPASE: CPT

## 2020-09-22 PROCEDURE — 82565 ASSAY OF CREATININE: CPT

## 2020-09-22 PROCEDURE — 99285 EMERGENCY DEPT VISIT HI MDM: CPT

## 2020-09-22 PROCEDURE — 84145 PROCALCITONIN (PCT): CPT

## 2020-09-22 PROCEDURE — 84295 ASSAY OF SERUM SODIUM: CPT

## 2020-09-22 PROCEDURE — 83605 ASSAY OF LACTIC ACID: CPT

## 2020-09-22 PROCEDURE — 6360000002 HC RX W HCPCS: Performed by: EMERGENCY MEDICINE

## 2020-09-22 PROCEDURE — 85014 HEMATOCRIT: CPT

## 2020-09-22 PROCEDURE — 2500000003 HC RX 250 WO HCPCS: Performed by: STUDENT IN AN ORGANIZED HEALTH CARE EDUCATION/TRAINING PROGRAM

## 2020-09-22 PROCEDURE — 82330 ASSAY OF CALCIUM: CPT

## 2020-09-22 PROCEDURE — 82947 ASSAY GLUCOSE BLOOD QUANT: CPT

## 2020-09-22 PROCEDURE — 82435 ASSAY OF BLOOD CHLORIDE: CPT

## 2020-09-22 PROCEDURE — 84132 ASSAY OF SERUM POTASSIUM: CPT

## 2020-09-22 PROCEDURE — 85025 COMPLETE CBC W/AUTO DIFF WBC: CPT

## 2020-09-22 PROCEDURE — U0002 COVID-19 LAB TEST NON-CDC: HCPCS

## 2020-09-22 PROCEDURE — 2580000003 HC RX 258: Performed by: EMERGENCY MEDICINE

## 2020-09-22 PROCEDURE — 2580000003 HC RX 258: Performed by: STUDENT IN AN ORGANIZED HEALTH CARE EDUCATION/TRAINING PROGRAM

## 2020-09-22 PROCEDURE — 81001 URINALYSIS AUTO W/SCOPE: CPT

## 2020-09-22 PROCEDURE — 96365 THER/PROPH/DIAG IV INF INIT: CPT

## 2020-09-22 PROCEDURE — 2000000000 HC ICU R&B

## 2020-09-22 PROCEDURE — 87086 URINE CULTURE/COLONY COUNT: CPT

## 2020-09-22 PROCEDURE — 96376 TX/PRO/DX INJ SAME DRUG ADON: CPT

## 2020-09-22 PROCEDURE — 82803 BLOOD GASES ANY COMBINATION: CPT

## 2020-09-22 PROCEDURE — 96375 TX/PRO/DX INJ NEW DRUG ADDON: CPT

## 2020-09-22 PROCEDURE — 93005 ELECTROCARDIOGRAM TRACING: CPT | Performed by: STUDENT IN AN ORGANIZED HEALTH CARE EDUCATION/TRAINING PROGRAM

## 2020-09-22 PROCEDURE — 6360000002 HC RX W HCPCS: Performed by: STUDENT IN AN ORGANIZED HEALTH CARE EDUCATION/TRAINING PROGRAM

## 2020-09-22 PROCEDURE — 74176 CT ABD & PELVIS W/O CONTRAST: CPT

## 2020-09-22 PROCEDURE — 80053 COMPREHEN METABOLIC PANEL: CPT

## 2020-09-22 RX ORDER — MORPHINE SULFATE 4 MG/ML
4 INJECTION, SOLUTION INTRAMUSCULAR; INTRAVENOUS ONCE
Status: COMPLETED | OUTPATIENT
Start: 2020-09-22 | End: 2020-09-22

## 2020-09-22 RX ORDER — ONDANSETRON 2 MG/ML
4 INJECTION INTRAMUSCULAR; INTRAVENOUS ONCE
Status: COMPLETED | OUTPATIENT
Start: 2020-09-22 | End: 2020-09-22

## 2020-09-22 RX ORDER — 0.9 % SODIUM CHLORIDE 0.9 %
1000 INTRAVENOUS SOLUTION INTRAVENOUS ONCE
Status: COMPLETED | OUTPATIENT
Start: 2020-09-22 | End: 2020-09-22

## 2020-09-22 RX ORDER — SODIUM CHLORIDE, SODIUM LACTATE, POTASSIUM CHLORIDE, AND CALCIUM CHLORIDE .6; .31; .03; .02 G/100ML; G/100ML; G/100ML; G/100ML
1000 INJECTION, SOLUTION INTRAVENOUS ONCE
Status: COMPLETED | OUTPATIENT
Start: 2020-09-22 | End: 2020-09-23

## 2020-09-22 RX ORDER — SODIUM CHLORIDE, SODIUM LACTATE, POTASSIUM CHLORIDE, AND CALCIUM CHLORIDE .6; .31; .03; .02 G/100ML; G/100ML; G/100ML; G/100ML
1000 INJECTION, SOLUTION INTRAVENOUS ONCE
Status: COMPLETED | OUTPATIENT
Start: 2020-09-22 | End: 2020-09-22

## 2020-09-22 RX ADMIN — METRONIDAZOLE 500 MG: 500 INJECTION, SOLUTION INTRAVENOUS at 22:04

## 2020-09-22 RX ADMIN — SODIUM CHLORIDE 1000 ML: 9 INJECTION, SOLUTION INTRAVENOUS at 16:54

## 2020-09-22 RX ADMIN — SODIUM CHLORIDE, POTASSIUM CHLORIDE, SODIUM LACTATE AND CALCIUM CHLORIDE 1000 ML: 600; 310; 30; 20 INJECTION, SOLUTION INTRAVENOUS at 17:51

## 2020-09-22 RX ADMIN — HYDROMORPHONE HYDROCHLORIDE 1 MG: 1 INJECTION, SOLUTION INTRAMUSCULAR; INTRAVENOUS; SUBCUTANEOUS at 22:37

## 2020-09-22 RX ADMIN — SODIUM CHLORIDE, POTASSIUM CHLORIDE, SODIUM LACTATE AND CALCIUM CHLORIDE 1000 ML: 600; 310; 30; 20 INJECTION, SOLUTION INTRAVENOUS at 23:12

## 2020-09-22 RX ADMIN — MORPHINE SULFATE 4 MG: 4 INJECTION INTRAVENOUS at 17:51

## 2020-09-22 RX ADMIN — CEFEPIME HYDROCHLORIDE 1 G: 1 INJECTION, POWDER, FOR SOLUTION INTRAMUSCULAR; INTRAVENOUS at 21:15

## 2020-09-22 RX ADMIN — HYDROMORPHONE HYDROCHLORIDE 0.5 MG: 1 INJECTION, SOLUTION INTRAMUSCULAR; INTRAVENOUS; SUBCUTANEOUS at 21:12

## 2020-09-22 RX ADMIN — MORPHINE SULFATE 4 MG: 4 INJECTION INTRAVENOUS at 17:03

## 2020-09-22 RX ADMIN — ONDANSETRON 4 MG: 2 INJECTION INTRAMUSCULAR; INTRAVENOUS at 16:59

## 2020-09-22 ASSESSMENT — ENCOUNTER SYMPTOMS
NAUSEA: 1
EYE DISCHARGE: 0
COLOR CHANGE: 0
SHORTNESS OF BREATH: 0
VOMITING: 1
EYE REDNESS: 0
ABDOMINAL PAIN: 1

## 2020-09-22 ASSESSMENT — PAIN SCALES - GENERAL
PAINLEVEL_OUTOF10: 7
PAINLEVEL_OUTOF10: 10
PAINLEVEL_OUTOF10: 10
PAINLEVEL_OUTOF10: 7
PAINLEVEL_OUTOF10: 9

## 2020-09-22 NOTE — ED PROVIDER NOTES
[facility] ED  Emergency Department Encounter  Emergency Medicine Resident     Pt Name: Kareem Mane  MRN: [id number]  [place] 1960  Date of evaluation: 9/22/20  PCP:  Blanca Duggan MD    CHIEF COMPLAINT       Chief Complaint   Patient presents with    Abdominal Pain    Nausea       HISTORY OFPRESENT ILLNESS  (Location/Symptom, Timing/Onset, Context/Setting, Quality, Duration, Modifying Factors,Severity.)      Farida Grandchild II is a 56 y.o. male who presents with 1 day history of nausea, vomiting, and left flank/abdominal pain. Patient has a history of nephrectomy 20 days ago. Today, he began feeling nauseas and had two episodes of emesis. He has left flank pain that radiates to his anterior abdomen, rated 8/10 aching, sharp pain. Patient states he is able to make urine and had a bowel movement this morning. He is not taking any pain medication besides tylenol which he took prior to throwing up. Patient denies headache, changes in vision, cough, SOB, chest pain, numbness/tingling. PAST MEDICAL / SURGICAL / SOCIAL / FAMILY HISTORY      has a past medical history of Back pain, Cellulitis, Diabetes mellitus ([facility] [address] Gout, History of kidney cancer, and Hypertension. has a past surgical history that includes Ankle surgery; knee surgery (Bilateral); Carpal tunnel release (Bilateral); Cystoscopy (Right, 9/1/2020); total nephrectomy (Left, 09/02/2020); Abdominal exploration surgery (09/02/2020); hc cath power picc triple (9/3/2020); Kidney surgery (Left, 9/2/2020); and Small intestine surgery (N/A, 9/2/2020).     Social History     Socioeconomic History    Marital status:      Spouse name: Not on file    Number of children: Not on file    Years of education: Not on file    Highest education level: Not on file   Occupational History    Not on file   Social Needs    Financial resource strain: Not on file    Food insecurity     Worry: Not on file     Inability: Not on Units  300-349 4 Units  350-399 5 Units  Over 399 6 Units 9/11/20   Jessica Cruz MD   ferrous sulfate (IRON 325) 325 (65 Fe) MG tablet Take 1 tablet by mouth daily (with breakfast) 9/11/20   Jessica Cruz MD   pantoprazole (PROTONIX) 40 MG tablet Take 1 tablet by mouth daily 9/11/20   Jessica Cruz MD   blood glucose monitor strips Test 4 times a day & as needed for symptoms of irregular blood glucose. Dispense sufficient amount for indicated testing frequency plus additional to accommodate PRN testing needs. 9/11/20   Jessica Cruz MD   Alcohol Swabs PADS 1 each by Does not apply route 4 times daily 9/11/20   Jessica Cruz MD   Insulin Pen Needle ([employer] PEN NEEDLES) 31G X 6 MM MISC 1 each by Does not apply route daily 9/11/20   Jessica Cruz MD   Lancets MISC 1 each by Does not apply route 3 times daily 9/11/20   Jessica Cruz MD   glucose monitoring kit (FREESTYLE) monitoring kit 1 kit by Does not apply route daily 9/11/20   Jessica Cruz MD   traMADol (ULTRAM) 50 MG tablet Take 100 mg by mouth every 6 hours as needed for Pain.     Historical Provider, MD   Melatonin 10 MG TABS Take 10 mg by mouth nightly as needed (SLEEP)    Historical Provider, MD   vitamin C (ASCORBIC ACID) 500 MG tablet Take 500 mg by mouth daily    Historical Provider, MD   acetaminophen (TYLENOL) 500 MG tablet Take 2 tablets by mouth every 6 hours as needed for Pain 8/11/20 8/21/20  Elfego Lott MD   cyclobenzaprine (FLEXERIL) 10 MG tablet Take 1 tablet by mouth 3 times daily as needed for Muscle spasms  Patient taking differently: Take 10 mg by mouth 2 times daily as needed for Muscle spasms  8/11/20   Elfego Lott MD   aspirin 81 MG tablet Take 81 mg by mouth nightly     Historical Provider, MD   pravastatin (PRAVACHOL) 20 MG tablet Take 20 mg by mouth daily    Historical Provider, MD   metoprolol (LOPRESSOR) 50 MG tablet Take 50 mg by mouth 2 times daily    Historical Provider, MD   allopurinol (ZYLOPRIM) 100 MG tablet Take 100 mg by mouth No wheezing or rales. Abdominal:      Tenderness: There is abdominal tenderness. There is guarding. Comments: Tender to palpation to left lower abdomen and left flank. Voluntary guarding. Rebound tenderness. Midline incision well healing. Musculoskeletal: Normal range of motion. Skin:     General: Skin is warm and dry. Findings: No erythema or rash. Neurological:      Mental Status: He is alert and oriented to person, place, and time.    Psychiatric:         Behavior: Behavior normal.         DIFFERENTIAL  DIAGNOSIS     PLAN (LABS / IMAGING / EKG):  Orders Placed This Encounter   Procedures    Culture, Urine    Culture, Urine    CT ABDOMEN PELVIS WO CONTRAST Additional Contrast? None    CT ABDOMEN PELVIS WO CONTRAST Additional Contrast? Oral and Rectal (Water soluble contrast through G tube and rectal)    CBC Auto Differential    Comprehensive Metabolic Panel    Lactic Acid, Whole Blood    Lipase    Urinalysis Reflex to Culture    Hemoglobin and hematocrit, blood    SODIUM (POC)    POTASSIUM (POC)    CHLORIDE (POC)    CALCIUM, IONIC (POC)    COVID-19    Microscopic Urinalysis    Procalcitonin    Calcium, Ionized    OPEN HEART PANEL    Diet NPO Effective Now    Vital signs per unit routine    Notify physician     Phase I - bedrest    Phase II - up with assistance    Phase I - warming device    Phase I - cardiac monitor    Phase I & II - check level of sensory block    Phase I & II - femoral nerve block    Phase I & II - neurological checks    Phase I & II - IV infusion rate    Nursing communication - Discharge from PACU    Encourage deep breathing and coughing    Continuous Pulse Oximetry    Inpatient consult to General Surgery    Inpatient consult to Primary Care Provider    Initiate Oxygen Therapy Protocol    Venous Blood Gas, POC    Creatinine W/GFR Point of Care    Lactic Acid, POC    POCT Glucose    Anion Gap (Calc) POC    Phase I & II - metered glucose    EKG 12 Lead    Discontinue IV    PATIENT STATUS (FROM ED OR OR/PROCEDURAL)       MEDICATIONS ORDERED:  Orders Placed This Encounter   Medications    ondansetron (ZOFRAN) injection 4 mg    0.9 % sodium chloride bolus    morphine injection 4 mg    morphine injection 4 mg    lactated ringers bolus    HYDROmorphone (DILAUDID) injection 0.5 mg    cefepime (MAXIPIME) 1 g IVPB minibag    metronidazole (FLAGYL) 500 mg in NaCl 100 mL IVPB premix    HYDROmorphone (DILAUDID) injection 1 mg    lactated ringers bolus    iohexol (OMNIPAQUE 240) injection 50 mL    HYDROmorphone (DILAUDID) injection 1 mg    HYDROmorphone (DILAUDID) 1 MG/ML injection     Cassi Riddle: cabinet override    meperidine (DEMEROL) injection SOLN 12.5 mg    HYDROmorphone (DILAUDID) injection 0.25 mg    HYDROmorphone (DILAUDID) injection 0.5 mg    sodium chloride 0.9 % irrigation       DDX: Anastomotic leak versus pancreatitis versus bowel obstruction    Initial MDM/Plan: 56 y.o. male who presents with abdominal pain, nausea, vomiting after approximately 3 weeks postop from radical nephrectomy with partial colon resection for renal cell carcinoma. Will discuss with surgery. Patient is tachycardic with mildly elevated temperature. Will get septic work-up. Imaging.     DIAGNOSTIC RESULTS / EMERGENCY DEPARTMENT COURSE / MDM     LABS:  Labs Reviewed   CBC WITH AUTO DIFFERENTIAL - Abnormal; Notable for the following components:       Result Value    WBC 13.6 (*)     Hemoglobin 12.2 (*)     Hematocrit 39.7 (*)     RDW 15.0 (*)     Platelets 851 (*)     Seg Neutrophils 85 (*)     Lymphocytes 7 (*)     Eosinophils % 0 (*)     Immature Granulocytes 1 (*)     Segs Absolute 11.48 (*)     Absolute Lymph # 0.94 (*)     All other components within normal limits   COMPREHENSIVE METABOLIC PANEL - Abnormal; Notable for the following components:    Glucose 201 (*)     CREATININE 1.63 (*)     Sodium 131 (*)     CO2 17 (*)     Total Protein 8.4 (*)     Alb 3.1 (*)     Albumin/Globulin Ratio 0.6 (*)     GFR Non- 43 (*)     GFR  52 (*)     All other components within normal limits   LIPASE - Abnormal; Notable for the following components:    Lipase 253 (*)     All other components within normal limits   URINE RT REFLEX TO CULTURE - Abnormal; Notable for the following components:    Protein, UA 1+ (*)     Leukocyte Esterase, Urine MODERATE (*)     All other components within normal limits   POTASSIUM (POC) - Abnormal; Notable for the following components:    POC Potassium 5.2 (*)     All other components within normal limits   PROCALCITONIN - Abnormal; Notable for the following components:    Procalcitonin 0.18 (*)     All other components within normal limits   OPEN HEART PANEL - Abnormal; Notable for the following components:    pO2, Arterial 187.0 (*)     HCO3, Arterial 19.0 (*)     Negative Base Excess, Art 5.2 (*)     POC Glucose 152 (*)     All other components within normal limits   VENOUS BLOOD GAS, POINT OF CARE - Abnormal; Notable for the following components:    pH, Yamil 7.445 (*)     pCO2, Yamil 29.4 (*)     HCO3, Venous 20.2 (*)     Total CO2, Venous 21 (*)     Negative Base Excess, Yamil 3 (*)     All other components within normal limits   CREATININE W/GFR POINT OF CARE - Abnormal; Notable for the following components:    POC Creatinine 1.56 (*)     GFR Comment 55 (*)     GFR Non- 45 (*)     All other components within normal limits   POCT GLUCOSE - Abnormal; Notable for the following components:    POC Glucose 222 (*)     All other components within normal limits   CULTURE, URINE   CULTURE, URINE   LACTIC ACID, WHOLE BLOOD   HGB/HCT   SODIUM (POC)   CHLORIDE (POC)   CALCIUM, IONIC (POC)   COVID-19   MICROSCOPIC URINALYSIS   CALCIUM, IONIZED   LACTIC ACID,POINT OF CARE   ANION GAP (CALC) POC         RADIOLOGY:  Ct Abdomen Pelvis Wo Contrast Additional Contrast? Oral And Rectal (water Soluble Contrast Through G Tube And Rectal)    Result Date: 9/23/2020  1. Leakage of contrast at the colocolonic anastomosis with an a large adjacent collection of fluid and gas. 2. Reactive infiltration of the omentum and left retroperitoneum. Ct Abdomen Pelvis Wo Contrast Additional Contrast? None    Result Date: 9/22/2020  EXAMINATION: CT OF THE ABDOMEN AND PELVIS WITHOUT CONTRAST 9/22/2020 7:39 pm TECHNIQUE: CT of the abdomen and pelvis was performed without the administration of intravenous contrast. Multiplanar reformatted images are provided for review. Dose modulation, iterative reconstruction, and/or weight based adjustment of the mA/kV was utilized to reduce the radiation dose to as low as reasonably achievable. COMPARISON: 09/07/2020 HISTORY: ORDERING SYSTEM PROVIDED HISTORY: Recent nephrectomy and bowel anastomoses, left flank and abdominal pain, nausea vomiting TECHNOLOGIST PROVIDED HISTORY: Recent nephrectomy and bowel anastomoses, left flank and abdominal pain, nausea vomiting Reason for Exam: recent nephrectomy and bowel anastomoses, left flank and abdominal pain, nausea/vomiting Acuity: Unknown Type of Exam: Unknown FINDINGS: Lower Chest: Small left pleural effusion adjacent left lower lobe airspace disease, likely atelectasis. Subtle right basilar atelectasis/scarring. Base the heart is normal in size without pericardial fluid collection. Organs: Perihepatic fluid noted. No focal hepatic lesions. The gallbladder, pancreas, and spleen are grossly stable. There are new foci of intraperitoneal air surrounding liver and spleen. GI/Bowel: Status post partial colectomy and left lower quadrant and anastomosis. No obvious bowel obstruction identified, however there is increasing free intraperitoneal air as compared to prior study of 09/07/2020. Correlation for interval surgical instrumentation recommended. Small amount of pelvic free fluid and fluid within the right pericolic gutter.   Persistent ill-defined patchy mesenteric edema identified primarily in the left lower quadrant. Pelvis: Small amount of pelvic free fluid. Peritoneum/Retroperitoneum: Status post left nephrectomy. Adrenal glands are normal in size and configuration. Right-sided ureteral stent noted in place. Urinary bladder is otherwise grossly unremarkable. Bones/Soft Tissues: Advanced multilevel degenerative disc disease essentially throughout the visualized thoracolumbar spine. Subcortical endplate sclerosis and irregularity within multiple lower lumbar spine vertebral bodies. Advanced facet arthropathy within the mid to lower lumbar spine. Bilateral severe neural foraminal stenosis at L4-L5 and probably also at L3 through S1. Increasing free intraperitoneal air and fluid as compared to previous study of 09/07/2020. No obvious bowel obstruction or inflammation identified. Correlate for interval surgical instrumentation to the abdomen. Multiple additional chronic findings as described above. EMERGENCY DEPARTMENT COURSE:  ED Course as of Sep 23 0354   Tue Sep 22, 2020   1632 Nephrectomy by Sandra Fritz with ex lap and colon resection with G-tube placement [facility] 9/2    [MS]   1753 Lipase(!): 253 [MS]      ED Course User Index  [MS] Diana Render, DO     Patient does have elevated lipase of 253. Multiple IV pain medications given. CT scan concerning for increasing free intraperitoneal air as compared to previous study of 9/7. No recent surgery since then. Case discussed at length with general surgery resident. Will get COVID swab. Plan for OR either tonight or tomorrow. Concern for anastomotic leak however will get CT scan with oral and rectal contrast to better evaluate this. Case discussed with Dr. Kelly Donald who is patient's PCP. Will admit to PCP. Started on cefepime, Flagyl as patient has a penicillin allergy. Repeat CT scan with rectal and oral contrast coming concerning for anastomotic leak. Patient to go to the OR immediately. Discussed with surgery residents. PROCEDURES:  None    CONSULTS:  IP CONSULT TO GENERAL SURGERY  IP CONSULT TO PRIMARY CARE PROVIDER    CRITICAL CARE:  Please see attending note    FINAL IMPRESSION      1. Free intraperitoneal air          DISPOSITION / PLAN     DISPOSITION Admitted 09/22/2020 09:49:41 PM        PATIENTREFERRED TO:  No follow-up provider specified.     DISCHARGE MEDICATIONS:  New Prescriptions    No medications on file       Faraz Delgado DO  EmergencyMedicine Resident    (Please note that portions of this note were completed with a voice recognition program.  Efforts were made to edit the dictations but occasionally words are mis-transcribed.)       Faraz Delgado DO  Resident  09/23/20 2065

## 2020-09-22 NOTE — ED TRIAGE NOTES
Pt. To room 44 via triage. Pt. Ill appearing, hot, moist, pale, with dry MM. S/P left nephrectomy 9/1/2020 due to a mass. Midline abd incision noted. Well approximated and scabbed. LUQ drainage tube/G-tube also noted. Pt. States he was doing well until today when he began to have abdominal pain, left side pain and nausea. Pt. denies dysuria and hematuria.

## 2020-09-22 NOTE — ED PROVIDER NOTES
[facility]  Emergency Department  Faculty Attestation     I performed a history and physical examination of the patient and discussed management with the resident. I reviewed the residents note and agree with the documented findings and plan of care. Any areas of disagreement are noted on the chart. I was personally present for the key portions of any procedures. I have documented in the chart those procedures where I was not present during the key portions. I have reviewed the emergency nurses triage note. I agree with the chief complaint, past medical history, past surgical history, allergies, medications, social and family history as documented unless otherwise noted below. For Physician Assistant/ Nurse Practitioner cases/documentation I have personally evaluated this patient and have completed at least one if not all key elements of the E/M (history, physical exam, and MDM). Additional findings are as noted. Primary Care Physician:  Amanda Monsivais MD    Screenings:  [unfilled]    CHIEF COMPLAINT     No chief complaint on file. RECENT VITALS:   Temp: 98.6 °F (37 °C),   ,  ,      LABS:  Labs Reviewed   CBC WITH AUTO DIFFERENTIAL   COMPREHENSIVE METABOLIC PANEL   LACTIC ACID, WHOLE BLOOD   LIPASE   URINE RT REFLEX TO CULTURE       Radiology  No orders to display         EKG:   EKG Interpretation    Interpreted by me    Rhythm: normal sinus   Rate: Tachycardia  Axis: Left  Ectopy: none  Conduction: normal  ST Segments: no acute change  T Waves: Diffuse flattening  Q Waves: none    Clinical Impression: Tachycardia, nonspecific    Attending Physician Additional  Notes    Patient has 1 day of illness. He is 20 days postop for left nephrectomy and bowel surgery. He has a G-tube. He has been up ambulating and taking p.o. and urinating. Today he had sudden onset of severe left flank pain into his abdomen, both are similar intensity, nausea, vomiting, chills.   No sweats no documented fevers. No shortness of breath cough sputum or hemoptysis. No leg pain calf swelling mobility. No blood in his emesis. No drainage from his G-tube site. On exam he is ill-appearing, tachycardic, normotensive, afebrile. Abdomen is distended with diffuse tenderness and guarding. No evidence of cellulitis at the wound site. No drainage. Lungs are clear. No edema cords Homans or calf tenderness. Concern is for bowel obstruction, consider infection, volume depletion, other cause. Plan is IV access, fluids, analgesics, antiemetics, chest x-ray, CT abdomen, urine testing, consultation with his surgeons, admission. Stu Pedroza.  Van Hernandez MD, [address] [facility]  Attending Emergency  Physician                Radha Tatum MD  09/22/20 [id number]

## 2020-09-23 ENCOUNTER — ANESTHESIA (OUTPATIENT)
Dept: OPERATING ROOM | Age: 60
DRG: 856 | End: 2020-09-23
Payer: COMMERCIAL

## 2020-09-23 ENCOUNTER — APPOINTMENT (OUTPATIENT)
Dept: GENERAL RADIOLOGY | Age: 60
DRG: 856 | End: 2020-09-23
Payer: COMMERCIAL

## 2020-09-23 ENCOUNTER — ANESTHESIA EVENT (OUTPATIENT)
Dept: OPERATING ROOM | Age: 60
DRG: 856 | End: 2020-09-23
Payer: COMMERCIAL

## 2020-09-23 ENCOUNTER — APPOINTMENT (OUTPATIENT)
Dept: CT IMAGING | Age: 60
DRG: 856 | End: 2020-09-23
Payer: COMMERCIAL

## 2020-09-23 VITALS — OXYGEN SATURATION: 100 % | SYSTOLIC BLOOD PRESSURE: 105 MMHG | TEMPERATURE: 98.8 F | DIASTOLIC BLOOD PRESSURE: 85 MMHG

## 2020-09-23 LAB
ABSOLUTE EOS #: 0 K/UL (ref 0–0.4)
ABSOLUTE EOS #: 0 K/UL (ref 0–0.44)
ABSOLUTE IMMATURE GRANULOCYTE: 0 K/UL (ref 0–0.3)
ABSOLUTE IMMATURE GRANULOCYTE: 0.2 K/UL (ref 0–0.3)
ABSOLUTE LYMPH #: 0.63 K/UL (ref 1–4.8)
ABSOLUTE LYMPH #: 1.4 K/UL (ref 1.1–3.7)
ABSOLUTE MONO #: 1.47 K/UL (ref 0.1–0.8)
ABSOLUTE MONO #: 1.8 K/UL (ref 0.1–1.2)
ALLEN TEST: ABNORMAL
ANION GAP SERPL CALCULATED.3IONS-SCNC: 11 MMOL/L (ref 9–17)
ANION GAP SERPL CALCULATED.3IONS-SCNC: 11 MMOL/L (ref 9–17)
ANION GAP SERPL CALCULATED.3IONS-SCNC: 7 MMOL/L (ref 9–17)
BASOPHILS # BLD: 0 % (ref 0–2)
BASOPHILS # BLD: 1 % (ref 0–2)
BASOPHILS ABSOLUTE: 0 K/UL (ref 0–0.2)
BASOPHILS ABSOLUTE: 0.2 K/UL (ref 0–0.2)
BUN BLDV-MCNC: 14 MG/DL (ref 8–23)
BUN BLDV-MCNC: 16 MG/DL (ref 8–23)
BUN BLDV-MCNC: 16 MG/DL (ref 8–23)
BUN/CREAT BLD: ABNORMAL (ref 9–20)
CALCIUM IONIZED: 1.22 MMOL/L (ref 1.13–1.33)
CALCIUM SERPL-MCNC: 7.7 MG/DL (ref 8.6–10.4)
CALCIUM SERPL-MCNC: 8 MG/DL (ref 8.6–10.4)
CALCIUM SERPL-MCNC: 8 MG/DL (ref 8.6–10.4)
CARBOXYHEMOGLOBIN: 1.3 % (ref 0–5)
CHLORIDE BLD-SCNC: 106 MMOL/L (ref 98–107)
CHLORIDE BLD-SCNC: 106 MMOL/L (ref 98–107)
CHLORIDE BLD-SCNC: 107 MMOL/L (ref 98–107)
CHLORIDE, WHOLE BLOOD: 107 MMOL/L (ref 98–110)
CO2: 17 MMOL/L (ref 20–31)
CO2: 19 MMOL/L (ref 20–31)
CO2: 20 MMOL/L (ref 20–31)
CREAT SERPL-MCNC: 1.23 MG/DL (ref 0.7–1.2)
CREAT SERPL-MCNC: 1.57 MG/DL (ref 0.7–1.2)
CREAT SERPL-MCNC: 1.6 MG/DL (ref 0.7–1.2)
CULTURE: NO GROWTH
DIFFERENTIAL TYPE: ABNORMAL
DIFFERENTIAL TYPE: ABNORMAL
EKG ATRIAL RATE: 139 BPM
EKG P AXIS: 14 DEGREES
EKG P-R INTERVAL: 114 MS
EKG Q-T INTERVAL: 290 MS
EKG QRS DURATION: 74 MS
EKG QTC CALCULATION (BAZETT): 441 MS
EKG R AXIS: 0 DEGREES
EKG T AXIS: 43 DEGREES
EKG VENTRICULAR RATE: 139 BPM
EOSINOPHILS RELATIVE PERCENT: 0 % (ref 1–4)
EOSINOPHILS RELATIVE PERCENT: 0 % (ref 1–4)
FIO2: ABNORMAL
GFR AFRICAN AMERICAN: 53 ML/MIN
GFR AFRICAN AMERICAN: 55 ML/MIN
GFR AFRICAN AMERICAN: >60 ML/MIN
GFR NON-AFRICAN AMERICAN: 44 ML/MIN
GFR NON-AFRICAN AMERICAN: 45 ML/MIN
GFR NON-AFRICAN AMERICAN: 60 ML/MIN
GFR SERPL CREATININE-BSD FRML MDRD: ABNORMAL ML/MIN/{1.73_M2}
GLUCOSE BLD-MCNC: 139 MG/DL (ref 75–110)
GLUCOSE BLD-MCNC: 152 MG/DL (ref 75–110)
GLUCOSE BLD-MCNC: 156 MG/DL (ref 75–110)
GLUCOSE BLD-MCNC: 162 MG/DL (ref 75–110)
GLUCOSE BLD-MCNC: 174 MG/DL (ref 70–99)
GLUCOSE BLD-MCNC: 179 MG/DL (ref 70–99)
GLUCOSE BLD-MCNC: 189 MG/DL (ref 70–99)
HCO3 ARTERIAL: 19 MMOL/L (ref 22–27)
HCT VFR BLD CALC: 31.1 %
HCT VFR BLD CALC: 36.7 % (ref 40.7–50.3)
HCT VFR BLD CALC: 39.2 % (ref 40.7–50.3)
HEMOGLOBIN: 10.1 GM/DL
HEMOGLOBIN: 11.3 G/DL (ref 13–17)
HEMOGLOBIN: 12 G/DL (ref 13–17)
IMMATURE GRANULOCYTES: 0 %
IMMATURE GRANULOCYTES: 1 %
LACTIC ACID, WHOLE BLOOD: 2 MMOL/L (ref 0.7–2.1)
LIPASE: 198 U/L (ref 13–60)
LYMPHOCYTES # BLD: 3 % (ref 24–44)
LYMPHOCYTES # BLD: 7 % (ref 24–43)
Lab: NORMAL
MCH RBC QN AUTO: 27 PG (ref 25.2–33.5)
MCH RBC QN AUTO: 27.5 PG (ref 25.2–33.5)
MCHC RBC AUTO-ENTMCNC: 30.6 G/DL (ref 28.4–34.8)
MCHC RBC AUTO-ENTMCNC: 30.8 G/DL (ref 28.4–34.8)
MCV RBC AUTO: 87.8 FL (ref 82.6–102.9)
MCV RBC AUTO: 89.7 FL (ref 82.6–102.9)
METHEMOGLOBIN: ABNORMAL % (ref 0–1.5)
MODE: ABNORMAL
MONOCYTES # BLD: 7 % (ref 1–7)
MONOCYTES # BLD: 9 % (ref 3–12)
MORPHOLOGY: ABNORMAL
MORPHOLOGY: ABNORMAL
MRSA, DNA, NASAL: NORMAL
NEGATIVE BASE EXCESS, ART: 5.2 MMOL/L (ref 0–2)
NOTIFICATION TIME: ABNORMAL
NOTIFICATION: ABNORMAL
NRBC AUTOMATED: 0 PER 100 WBC
NRBC AUTOMATED: 0 PER 100 WBC
O2 DEVICE/FLOW/%: ABNORMAL
O2 SAT, ARTERIAL: 99.3 % (ref 94–100)
OXYHEMOGLOBIN: ABNORMAL % (ref 95–98)
PATIENT TEMP: 37
PCO2 ARTERIAL: 34.3 MMHG (ref 32–45)
PCO2, ART, TEMP ADJ: ABNORMAL (ref 32–45)
PDW BLD-RTO: 15 % (ref 11.8–14.4)
PDW BLD-RTO: 15.3 % (ref 11.8–14.4)
PEEP/CPAP: ABNORMAL
PH ARTERIAL: 7.36 (ref 7.35–7.45)
PH, ART, TEMP ADJ: ABNORMAL (ref 7.35–7.45)
PLATELET # BLD: 385 K/UL (ref 138–453)
PLATELET # BLD: 428 K/UL (ref 138–453)
PLATELET ESTIMATE: ABNORMAL
PLATELET ESTIMATE: ABNORMAL
PMV BLD AUTO: 10.1 FL (ref 8.1–13.5)
PMV BLD AUTO: 10.2 FL (ref 8.1–13.5)
PO2 ARTERIAL: 187 MMHG (ref 75–95)
PO2, ART, TEMP ADJ: ABNORMAL MMHG (ref 75–95)
POSITIVE BASE EXCESS, ART: ABNORMAL MMOL/L (ref 0–2)
POTASSIUM SERPL-SCNC: 4.7 MMOL/L (ref 3.7–5.3)
POTASSIUM SERPL-SCNC: 5.2 MMOL/L (ref 3.7–5.3)
POTASSIUM SERPL-SCNC: 5.7 MMOL/L (ref 3.7–5.3)
POTASSIUM, WHOLE BLOOD: 4.2 MMOL/L (ref 3.6–5)
PROCALCITONIN: 0.18 NG/ML
PSV: ABNORMAL
PT. POSITION: ABNORMAL
RBC # BLD: 4.18 M/UL (ref 4.21–5.77)
RBC # BLD: 4.37 M/UL (ref 4.21–5.77)
RBC # BLD: ABNORMAL 10*6/UL
RBC # BLD: ABNORMAL 10*6/UL
RESPIRATORY RATE: ABNORMAL
SAMPLE SITE: ABNORMAL
SEG NEUTROPHILS: 82 % (ref 36–65)
SEG NEUTROPHILS: 90 % (ref 36–66)
SEGMENTED NEUTROPHILS ABSOLUTE COUNT: 16.4 K/UL (ref 1.5–8.1)
SEGMENTED NEUTROPHILS ABSOLUTE COUNT: 18.9 K/UL (ref 1.8–7.7)
SET RATE: ABNORMAL
SODIUM BLD-SCNC: 133 MMOL/L (ref 135–144)
SODIUM BLD-SCNC: 134 MMOL/L (ref 135–144)
SODIUM BLD-SCNC: 137 MMOL/L (ref 135–144)
SODIUM, WHOLE BLOOD: 136 MMOL/L (ref 136–145)
SPECIMEN DESCRIPTION: NORMAL
SPECIMEN DESCRIPTION: NORMAL
TEXT FOR RESPIRATORY: ABNORMAL
TOTAL HB: ABNORMAL G/DL (ref 12–16)
TOTAL RATE: ABNORMAL
VT: ABNORMAL
WBC # BLD: 20 K/UL (ref 3.5–11.3)
WBC # BLD: 21 K/UL (ref 3.5–11.3)
WBC # BLD: ABNORMAL 10*3/UL
WBC # BLD: ABNORMAL 10*3/UL

## 2020-09-23 PROCEDURE — 6370000000 HC RX 637 (ALT 250 FOR IP): Performed by: STUDENT IN AN ORGANIZED HEALTH CARE EDUCATION/TRAINING PROGRAM

## 2020-09-23 PROCEDURE — 83690 ASSAY OF LIPASE: CPT

## 2020-09-23 PROCEDURE — 0D1L0Z4 BYPASS TRANSVERSE COLON TO CUTANEOUS, OPEN APPROACH: ICD-10-PCS | Performed by: SURGERY

## 2020-09-23 PROCEDURE — 94770 HC ETCO2 MONITOR DAILY: CPT

## 2020-09-23 PROCEDURE — 2500000003 HC RX 250 WO HCPCS

## 2020-09-23 PROCEDURE — 2580000003 HC RX 258: Performed by: STUDENT IN AN ORGANIZED HEALTH CARE EDUCATION/TRAINING PROGRAM

## 2020-09-23 PROCEDURE — P9041 ALBUMIN (HUMAN),5%, 50ML: HCPCS | Performed by: NURSE ANESTHETIST, CERTIFIED REGISTERED

## 2020-09-23 PROCEDURE — 3700000001 HC ADD 15 MINUTES (ANESTHESIA): Performed by: SURGERY

## 2020-09-23 PROCEDURE — 7100000000 HC PACU RECOVERY - FIRST 15 MIN: Performed by: SURGERY

## 2020-09-23 PROCEDURE — 3700000000 HC ANESTHESIA ATTENDED CARE: Performed by: SURGERY

## 2020-09-23 PROCEDURE — 2500000003 HC RX 250 WO HCPCS: Performed by: NURSE ANESTHETIST, CERTIFIED REGISTERED

## 2020-09-23 PROCEDURE — 83605 ASSAY OF LACTIC ACID: CPT

## 2020-09-23 PROCEDURE — 2580000003 HC RX 258: Performed by: SURGERY

## 2020-09-23 PROCEDURE — 2500000003 HC RX 250 WO HCPCS: Performed by: SURGERY

## 2020-09-23 PROCEDURE — 2500000003 HC RX 250 WO HCPCS: Performed by: ANESTHESIOLOGY

## 2020-09-23 PROCEDURE — 2700000000 HC OXYGEN THERAPY PER DAY

## 2020-09-23 PROCEDURE — 2580000003 HC RX 258: Performed by: NURSE ANESTHETIST, CERTIFIED REGISTERED

## 2020-09-23 PROCEDURE — 6360000002 HC RX W HCPCS: Performed by: STUDENT IN AN ORGANIZED HEALTH CARE EDUCATION/TRAINING PROGRAM

## 2020-09-23 PROCEDURE — 3600000004 HC SURGERY LEVEL 4 BASE: Performed by: SURGERY

## 2020-09-23 PROCEDURE — 6360000002 HC RX W HCPCS: Performed by: NURSE ANESTHETIST, CERTIFIED REGISTERED

## 2020-09-23 PROCEDURE — 7100000001 HC PACU RECOVERY - ADDTL 15 MIN: Performed by: SURGERY

## 2020-09-23 PROCEDURE — 2500000003 HC RX 250 WO HCPCS: Performed by: STUDENT IN AN ORGANIZED HEALTH CARE EDUCATION/TRAINING PROGRAM

## 2020-09-23 PROCEDURE — 82805 BLOOD GASES W/O2 SATURATION: CPT

## 2020-09-23 PROCEDURE — 74176 CT ABD & PELVIS W/O CONTRAST: CPT

## 2020-09-23 PROCEDURE — 93005 ELECTROCARDIOGRAM TRACING: CPT | Performed by: STUDENT IN AN ORGANIZED HEALTH CARE EDUCATION/TRAINING PROGRAM

## 2020-09-23 PROCEDURE — 51798 US URINE CAPACITY MEASURE: CPT

## 2020-09-23 PROCEDURE — 2720000010 HC SURG SUPPLY STERILE: Performed by: SURGERY

## 2020-09-23 PROCEDURE — 3E1M38Z IRRIGATION OF PERITONEAL CAVITY USING IRRIGATING SUBSTANCE, PERCUTANEOUS APPROACH: ICD-10-PCS | Performed by: SURGERY

## 2020-09-23 PROCEDURE — 2000000000 HC ICU R&B

## 2020-09-23 PROCEDURE — 71045 X-RAY EXAM CHEST 1 VIEW: CPT

## 2020-09-23 PROCEDURE — 6360000004 HC RX CONTRAST MEDICATION: Performed by: STUDENT IN AN ORGANIZED HEALTH CARE EDUCATION/TRAINING PROGRAM

## 2020-09-23 PROCEDURE — 82330 ASSAY OF CALCIUM: CPT

## 2020-09-23 PROCEDURE — P9047 ALBUMIN (HUMAN), 25%, 50ML: HCPCS | Performed by: STUDENT IN AN ORGANIZED HEALTH CARE EDUCATION/TRAINING PROGRAM

## 2020-09-23 PROCEDURE — 82947 ASSAY GLUCOSE BLOOD QUANT: CPT

## 2020-09-23 PROCEDURE — 3600000014 HC SURGERY LEVEL 4 ADDTL 15MIN: Performed by: SURGERY

## 2020-09-23 PROCEDURE — 80048 BASIC METABOLIC PNL TOTAL CA: CPT

## 2020-09-23 PROCEDURE — 94761 N-INVAS EAR/PLS OXIMETRY MLT: CPT

## 2020-09-23 PROCEDURE — 85014 HEMATOCRIT: CPT

## 2020-09-23 PROCEDURE — 85018 HEMOGLOBIN: CPT

## 2020-09-23 PROCEDURE — 2709999900 HC NON-CHARGEABLE SUPPLY: Performed by: SURGERY

## 2020-09-23 PROCEDURE — 6360000002 HC RX W HCPCS: Performed by: ANESTHESIOLOGY

## 2020-09-23 PROCEDURE — 85025 COMPLETE CBC W/AUTO DIFF WBC: CPT

## 2020-09-23 PROCEDURE — 36415 COLL VENOUS BLD VENIPUNCTURE: CPT

## 2020-09-23 PROCEDURE — 87641 MR-STAPH DNA AMP PROBE: CPT

## 2020-09-23 PROCEDURE — 87086 URINE CULTURE/COLONY COUNT: CPT

## 2020-09-23 PROCEDURE — 84132 ASSAY OF SERUM POTASSIUM: CPT

## 2020-09-23 RX ORDER — LIDOCAINE HYDROCHLORIDE 10 MG/ML
INJECTION, SOLUTION EPIDURAL; INFILTRATION; INTRACAUDAL; PERINEURAL PRN
Status: DISCONTINUED | OUTPATIENT
Start: 2020-09-23 | End: 2020-09-23 | Stop reason: SDUPTHER

## 2020-09-23 RX ORDER — ALBUMIN, HUMAN INJ 5% 5 %
SOLUTION INTRAVENOUS PRN
Status: DISCONTINUED | OUTPATIENT
Start: 2020-09-23 | End: 2020-09-23 | Stop reason: SDUPTHER

## 2020-09-23 RX ORDER — 0.9 % SODIUM CHLORIDE 0.9 %
1000 INTRAVENOUS SOLUTION INTRAVENOUS ONCE
Status: COMPLETED | OUTPATIENT
Start: 2020-09-23 | End: 2020-09-23

## 2020-09-23 RX ORDER — PRAVASTATIN SODIUM 20 MG
20 TABLET ORAL DAILY
Status: DISCONTINUED | OUTPATIENT
Start: 2020-09-23 | End: 2020-09-26

## 2020-09-23 RX ORDER — CIPROFLOXACIN 2 MG/ML
400 INJECTION, SOLUTION INTRAVENOUS EVERY 12 HOURS
Status: DISCONTINUED | OUTPATIENT
Start: 2020-09-23 | End: 2020-09-24

## 2020-09-23 RX ORDER — MAGNESIUM HYDROXIDE 1200 MG/15ML
LIQUID ORAL CONTINUOUS PRN
Status: COMPLETED | OUTPATIENT
Start: 2020-09-23 | End: 2020-09-23

## 2020-09-23 RX ORDER — FENTANYL CITRATE 50 UG/ML
25 INJECTION, SOLUTION INTRAMUSCULAR; INTRAVENOUS
Status: DISCONTINUED | OUTPATIENT
Start: 2020-09-23 | End: 2020-09-23 | Stop reason: ALTCHOICE

## 2020-09-23 RX ORDER — NALOXONE HYDROCHLORIDE 0.4 MG/ML
0.4 INJECTION, SOLUTION INTRAMUSCULAR; INTRAVENOUS; SUBCUTANEOUS PRN
Status: DISCONTINUED | OUTPATIENT
Start: 2020-09-23 | End: 2020-09-25

## 2020-09-23 RX ORDER — SODIUM CHLORIDE, SODIUM LACTATE, POTASSIUM CHLORIDE, AND CALCIUM CHLORIDE .6; .31; .03; .02 G/100ML; G/100ML; G/100ML; G/100ML
1000 INJECTION, SOLUTION INTRAVENOUS ONCE
Status: COMPLETED | OUTPATIENT
Start: 2020-09-23 | End: 2020-09-23

## 2020-09-23 RX ORDER — METOPROLOL TARTRATE 5 MG/5ML
INJECTION INTRAVENOUS
Status: COMPLETED
Start: 2020-09-23 | End: 2020-09-23

## 2020-09-23 RX ORDER — ACETAMINOPHEN 500 MG
1000 TABLET ORAL EVERY 8 HOURS SCHEDULED
Status: DISCONTINUED | OUTPATIENT
Start: 2020-09-23 | End: 2020-10-02 | Stop reason: HOSPADM

## 2020-09-23 RX ORDER — NEOSTIGMINE METHYLSULFATE 5 MG/5 ML
SYRINGE (ML) INTRAVENOUS PRN
Status: DISCONTINUED | OUTPATIENT
Start: 2020-09-23 | End: 2020-09-23 | Stop reason: SDUPTHER

## 2020-09-23 RX ORDER — SODIUM CHLORIDE, SODIUM LACTATE, POTASSIUM CHLORIDE, CALCIUM CHLORIDE 600; 310; 30; 20 MG/100ML; MG/100ML; MG/100ML; MG/100ML
INJECTION, SOLUTION INTRAVENOUS CONTINUOUS PRN
Status: DISCONTINUED | OUTPATIENT
Start: 2020-09-23 | End: 2020-09-23 | Stop reason: SDUPTHER

## 2020-09-23 RX ORDER — SODIUM CHLORIDE 9 MG/ML
INJECTION, SOLUTION INTRAVENOUS CONTINUOUS PRN
Status: DISCONTINUED | OUTPATIENT
Start: 2020-09-23 | End: 2020-09-23 | Stop reason: SDUPTHER

## 2020-09-23 RX ORDER — METOPROLOL TARTRATE 50 MG/1
50 TABLET, FILM COATED ORAL 2 TIMES DAILY
Status: DISCONTINUED | OUTPATIENT
Start: 2020-09-23 | End: 2020-09-23

## 2020-09-23 RX ORDER — ACETAMINOPHEN 325 MG/1
650 TABLET ORAL EVERY 4 HOURS PRN
Status: DISCONTINUED | OUTPATIENT
Start: 2020-09-23 | End: 2020-09-23

## 2020-09-23 RX ORDER — DEXTROSE MONOHYDRATE 50 MG/ML
100 INJECTION, SOLUTION INTRAVENOUS PRN
Status: DISCONTINUED | OUTPATIENT
Start: 2020-09-23 | End: 2020-10-02 | Stop reason: HOSPADM

## 2020-09-23 RX ORDER — ROCURONIUM BROMIDE 10 MG/ML
INJECTION, SOLUTION INTRAVENOUS PRN
Status: DISCONTINUED | OUTPATIENT
Start: 2020-09-23 | End: 2020-09-23 | Stop reason: SDUPTHER

## 2020-09-23 RX ORDER — METOPROLOL TARTRATE 5 MG/5ML
5 INJECTION INTRAVENOUS ONCE
Status: DISCONTINUED | OUTPATIENT
Start: 2020-09-23 | End: 2020-09-23

## 2020-09-23 RX ORDER — MORPHINE SULFATE 2 MG/ML
2 INJECTION, SOLUTION INTRAMUSCULAR; INTRAVENOUS
Status: DISCONTINUED | OUTPATIENT
Start: 2020-09-23 | End: 2020-09-23

## 2020-09-23 RX ORDER — OXYCODONE HYDROCHLORIDE 5 MG/1
5 TABLET ORAL EVERY 4 HOURS PRN
Status: DISCONTINUED | OUTPATIENT
Start: 2020-09-23 | End: 2020-09-23

## 2020-09-23 RX ORDER — SODIUM CHLORIDE, SODIUM LACTATE, POTASSIUM CHLORIDE, CALCIUM CHLORIDE 600; 310; 30; 20 MG/100ML; MG/100ML; MG/100ML; MG/100ML
INJECTION, SOLUTION INTRAVENOUS CONTINUOUS
Status: DISCONTINUED | OUTPATIENT
Start: 2020-09-23 | End: 2020-09-23

## 2020-09-23 RX ORDER — ONDANSETRON 2 MG/ML
INJECTION INTRAMUSCULAR; INTRAVENOUS PRN
Status: DISCONTINUED | OUTPATIENT
Start: 2020-09-23 | End: 2020-09-23 | Stop reason: SDUPTHER

## 2020-09-23 RX ORDER — LABETALOL HYDROCHLORIDE 5 MG/ML
10 INJECTION, SOLUTION INTRAVENOUS ONCE
Status: COMPLETED | OUTPATIENT
Start: 2020-09-23 | End: 2020-09-23

## 2020-09-23 RX ORDER — FENTANYL CITRATE 50 UG/ML
100 INJECTION, SOLUTION INTRAMUSCULAR; INTRAVENOUS ONCE
Status: COMPLETED | OUTPATIENT
Start: 2020-09-23 | End: 2020-09-23

## 2020-09-23 RX ORDER — LABETALOL HYDROCHLORIDE 5 MG/ML
INJECTION, SOLUTION INTRAVENOUS
Status: DISPENSED
Start: 2020-09-23 | End: 2020-09-23

## 2020-09-23 RX ORDER — ALBUMIN, HUMAN INJ 5% 5 %
25 SOLUTION INTRAVENOUS ONCE
Status: CANCELLED | OUTPATIENT
Start: 2020-09-23 | End: 2020-09-23

## 2020-09-23 RX ORDER — DIAZEPAM 5 MG/1
5 TABLET ORAL ONCE
Status: COMPLETED | OUTPATIENT
Start: 2020-09-23 | End: 2020-09-23

## 2020-09-23 RX ORDER — METOPROLOL TARTRATE 5 MG/5ML
5 INJECTION INTRAVENOUS ONCE
Status: COMPLETED | OUTPATIENT
Start: 2020-09-23 | End: 2020-09-23

## 2020-09-23 RX ORDER — SODIUM CHLORIDE 0.9 % (FLUSH) 0.9 %
10 SYRINGE (ML) INJECTION PRN
Status: DISCONTINUED | OUTPATIENT
Start: 2020-09-23 | End: 2020-10-02 | Stop reason: HOSPADM

## 2020-09-23 RX ORDER — GABAPENTIN 300 MG/1
300 CAPSULE ORAL 3 TIMES DAILY
Status: DISCONTINUED | OUTPATIENT
Start: 2020-09-23 | End: 2020-09-24

## 2020-09-23 RX ORDER — SODIUM CHLORIDE 0.9 % (FLUSH) 0.9 %
10 SYRINGE (ML) INJECTION EVERY 12 HOURS SCHEDULED
Status: DISCONTINUED | OUTPATIENT
Start: 2020-09-23 | End: 2020-10-02 | Stop reason: HOSPADM

## 2020-09-23 RX ORDER — FENTANYL CITRATE 50 UG/ML
INJECTION, SOLUTION INTRAMUSCULAR; INTRAVENOUS PRN
Status: DISCONTINUED | OUTPATIENT
Start: 2020-09-23 | End: 2020-09-23 | Stop reason: SDUPTHER

## 2020-09-23 RX ORDER — METOPROLOL TARTRATE 5 MG/5ML
10 INJECTION INTRAVENOUS 4 TIMES DAILY
Status: DISCONTINUED | OUTPATIENT
Start: 2020-09-23 | End: 2020-09-23

## 2020-09-23 RX ORDER — METHOCARBAMOL 750 MG/1
750 TABLET, FILM COATED ORAL 3 TIMES DAILY
Status: DISCONTINUED | OUTPATIENT
Start: 2020-09-23 | End: 2020-09-24

## 2020-09-23 RX ORDER — NICOTINE POLACRILEX 4 MG
15 LOZENGE BUCCAL PRN
Status: DISCONTINUED | OUTPATIENT
Start: 2020-09-23 | End: 2020-10-02 | Stop reason: HOSPADM

## 2020-09-23 RX ORDER — GLYCOPYRROLATE 1 MG/5 ML
SYRINGE (ML) INTRAVENOUS PRN
Status: DISCONTINUED | OUTPATIENT
Start: 2020-09-23 | End: 2020-09-23 | Stop reason: SDUPTHER

## 2020-09-23 RX ORDER — METOPROLOL TARTRATE 5 MG/5ML
INJECTION INTRAVENOUS
Status: COMPLETED
Start: 2020-09-23 | End: 2020-09-24

## 2020-09-23 RX ORDER — LIDOCAINE 4 G/G
2 PATCH TOPICAL DAILY
Status: DISCONTINUED | OUTPATIENT
Start: 2020-09-23 | End: 2020-10-02 | Stop reason: HOSPADM

## 2020-09-23 RX ORDER — METOPROLOL TARTRATE 5 MG/5ML
5 INJECTION INTRAVENOUS ONCE
Status: CANCELLED | OUTPATIENT
Start: 2020-09-23

## 2020-09-23 RX ORDER — PROPOFOL 10 MG/ML
INJECTION, EMULSION INTRAVENOUS PRN
Status: DISCONTINUED | OUTPATIENT
Start: 2020-09-23 | End: 2020-09-23 | Stop reason: SDUPTHER

## 2020-09-23 RX ORDER — DEXTROSE MONOHYDRATE 25 G/50ML
12.5 INJECTION, SOLUTION INTRAVENOUS PRN
Status: DISCONTINUED | OUTPATIENT
Start: 2020-09-23 | End: 2020-10-02 | Stop reason: HOSPADM

## 2020-09-23 RX ORDER — METOPROLOL TARTRATE 5 MG/5ML
5 INJECTION INTRAVENOUS 4 TIMES DAILY
Status: DISCONTINUED | OUTPATIENT
Start: 2020-09-23 | End: 2020-09-23

## 2020-09-23 RX ORDER — SODIUM CHLORIDE 9 MG/ML
INJECTION, SOLUTION INTRAVENOUS CONTINUOUS
Status: DISCONTINUED | OUTPATIENT
Start: 2020-09-23 | End: 2020-09-24

## 2020-09-23 RX ORDER — HEPARIN SODIUM 5000 [USP'U]/ML
5000 INJECTION, SOLUTION INTRAVENOUS; SUBCUTANEOUS EVERY 8 HOURS SCHEDULED
Status: DISCONTINUED | OUTPATIENT
Start: 2020-09-23 | End: 2020-09-25

## 2020-09-23 RX ORDER — METOPROLOL TARTRATE 5 MG/5ML
5 INJECTION INTRAVENOUS EVERY 4 HOURS
Status: DISCONTINUED | OUTPATIENT
Start: 2020-09-23 | End: 2020-09-24

## 2020-09-23 RX ORDER — METOPROLOL TARTRATE 5 MG/5ML
5 INJECTION INTRAVENOUS EVERY 6 HOURS
Status: DISCONTINUED | OUTPATIENT
Start: 2020-09-23 | End: 2020-09-23

## 2020-09-23 RX ORDER — ALBUMIN (HUMAN) 12.5 G/50ML
50 SOLUTION INTRAVENOUS EVERY 6 HOURS
Status: DISCONTINUED | OUTPATIENT
Start: 2020-09-23 | End: 2020-09-24

## 2020-09-23 RX ADMIN — HYDROMORPHONE HYDROCHLORIDE 1 MG: 1 INJECTION, SOLUTION INTRAMUSCULAR; INTRAVENOUS; SUBCUTANEOUS at 08:56

## 2020-09-23 RX ADMIN — METOPROLOL TARTRATE 5 MG: 1 INJECTION, SOLUTION INTRAVENOUS at 21:26

## 2020-09-23 RX ADMIN — ROCURONIUM BROMIDE 20 MG: 10 INJECTION INTRAVENOUS at 03:20

## 2020-09-23 RX ADMIN — FENTANYL CITRATE 50 MCG: 50 INJECTION INTRAMUSCULAR; INTRAVENOUS at 04:34

## 2020-09-23 RX ADMIN — SODIUM CHLORIDE, PRESERVATIVE FREE 10 ML: 5 INJECTION INTRAVENOUS at 20:13

## 2020-09-23 RX ADMIN — METRONIDAZOLE 500 MG: 500 INJECTION, SOLUTION INTRAVENOUS at 20:12

## 2020-09-23 RX ADMIN — HYDROMORPHONE HYDROCHLORIDE 0.25 MG: 1 INJECTION, SOLUTION INTRAMUSCULAR; INTRAVENOUS; SUBCUTANEOUS at 05:30

## 2020-09-23 RX ADMIN — MORPHINE SULFATE 2 MG: 2 INJECTION, SOLUTION INTRAMUSCULAR; INTRAVENOUS at 13:00

## 2020-09-23 RX ADMIN — HYDROMORPHONE HYDROCHLORIDE 1 MG: 1 INJECTION, SOLUTION INTRAMUSCULAR; INTRAVENOUS; SUBCUTANEOUS at 01:48

## 2020-09-23 RX ADMIN — HYDROMORPHONE HYDROCHLORIDE 0.5 MG: 1 INJECTION, SOLUTION INTRAMUSCULAR; INTRAVENOUS; SUBCUTANEOUS at 11:01

## 2020-09-23 RX ADMIN — PHENYLEPHRINE HYDROCHLORIDE 100 MCG: 10 INJECTION INTRAVENOUS at 03:38

## 2020-09-23 RX ADMIN — SODIUM CHLORIDE: 9 INJECTION, SOLUTION INTRAVENOUS at 02:28

## 2020-09-23 RX ADMIN — ALBUMIN (HUMAN) 50 G: 0.25 INJECTION, SOLUTION INTRAVENOUS at 20:30

## 2020-09-23 RX ADMIN — Medication 10 MG: at 04:42

## 2020-09-23 RX ADMIN — SODIUM CHLORIDE, POTASSIUM CHLORIDE, SODIUM LACTATE AND CALCIUM CHLORIDE: 600; 310; 30; 20 INJECTION, SOLUTION INTRAVENOUS at 02:57

## 2020-09-23 RX ADMIN — Medication 25 MCG: at 15:08

## 2020-09-23 RX ADMIN — Medication 3 MG: at 04:06

## 2020-09-23 RX ADMIN — FENTANYL CITRATE 50 MCG: 50 INJECTION INTRAMUSCULAR; INTRAVENOUS at 03:10

## 2020-09-23 RX ADMIN — Medication 1 MG: at 23:35

## 2020-09-23 RX ADMIN — HYDROMORPHONE HYDROCHLORIDE 0.25 MG: 1 INJECTION, SOLUTION INTRAMUSCULAR; INTRAVENOUS; SUBCUTANEOUS at 05:35

## 2020-09-23 RX ADMIN — SODIUM BICARBONATE 50 MEQ: 84 INJECTION, SOLUTION INTRAVENOUS at 03:32

## 2020-09-23 RX ADMIN — SODIUM CHLORIDE: 9 INJECTION, SOLUTION INTRAVENOUS at 22:50

## 2020-09-23 RX ADMIN — ROCURONIUM BROMIDE 50 MG: 10 INJECTION INTRAVENOUS at 02:42

## 2020-09-23 RX ADMIN — FENTANYL CITRATE 25 MCG: 50 INJECTION, SOLUTION INTRAMUSCULAR; INTRAVENOUS at 09:53

## 2020-09-23 RX ADMIN — SODIUM CHLORIDE 150 ML/HR: 9 INJECTION, SOLUTION INTRAVENOUS at 15:41

## 2020-09-23 RX ADMIN — SODIUM CHLORIDE 1000 ML: 9 INJECTION, SOLUTION INTRAVENOUS at 15:41

## 2020-09-23 RX ADMIN — ALBUMIN (HUMAN) 50 G: 0.25 INJECTION, SOLUTION INTRAVENOUS at 16:45

## 2020-09-23 RX ADMIN — FENTANYL CITRATE 50 MCG: 50 INJECTION INTRAMUSCULAR; INTRAVENOUS at 04:32

## 2020-09-23 RX ADMIN — PROPOFOL 180 MG: 10 INJECTION, EMULSION INTRAVENOUS at 02:42

## 2020-09-23 RX ADMIN — LIDOCAINE HYDROCHLORIDE 50 MG: 10 INJECTION, SOLUTION EPIDURAL; INFILTRATION; INTRACAUDAL; PERINEURAL at 02:42

## 2020-09-23 RX ADMIN — INSULIN LISPRO 3 UNITS: 100 INJECTION, SOLUTION INTRAVENOUS; SUBCUTANEOUS at 18:11

## 2020-09-23 RX ADMIN — HYDROMORPHONE HYDROCHLORIDE 0.5 MG: 1 INJECTION, SOLUTION INTRAMUSCULAR; INTRAVENOUS; SUBCUTANEOUS at 05:10

## 2020-09-23 RX ADMIN — METOPROLOL TARTRATE 5 MG: 1 INJECTION, SOLUTION INTRAVENOUS at 06:59

## 2020-09-23 RX ADMIN — PHENYLEPHRINE HYDROCHLORIDE 100 MCG: 10 INJECTION INTRAVENOUS at 03:29

## 2020-09-23 RX ADMIN — HYDROMORPHONE HYDROCHLORIDE 0.25 MG: 1 INJECTION, SOLUTION INTRAMUSCULAR; INTRAVENOUS; SUBCUTANEOUS at 05:45

## 2020-09-23 RX ADMIN — HYDROMORPHONE HYDROCHLORIDE 0.5 MG: 1 INJECTION, SOLUTION INTRAMUSCULAR; INTRAVENOUS; SUBCUTANEOUS at 04:55

## 2020-09-23 RX ADMIN — FENTANYL CITRATE 100 MCG: 50 INJECTION INTRAMUSCULAR; INTRAVENOUS at 17:27

## 2020-09-23 RX ADMIN — CIPROFLOXACIN 400 MG: 2 INJECTION, SOLUTION INTRAVENOUS at 20:12

## 2020-09-23 RX ADMIN — HYDROMORPHONE HYDROCHLORIDE 0.5 MG: 1 INJECTION, SOLUTION INTRAMUSCULAR; INTRAVENOUS; SUBCUTANEOUS at 05:15

## 2020-09-23 RX ADMIN — ALBUMIN (HUMAN) 500 ML: 12.5 INJECTION, SOLUTION INTRAVENOUS at 02:55

## 2020-09-23 RX ADMIN — PHENYLEPHRINE HYDROCHLORIDE 100 MCG: 10 INJECTION INTRAVENOUS at 02:49

## 2020-09-23 RX ADMIN — SODIUM CHLORIDE, POTASSIUM CHLORIDE, SODIUM LACTATE AND CALCIUM CHLORIDE: 600; 310; 30; 20 INJECTION, SOLUTION INTRAVENOUS at 06:56

## 2020-09-23 RX ADMIN — FENTANYL CITRATE 100 MCG: 50 INJECTION INTRAMUSCULAR; INTRAVENOUS at 03:02

## 2020-09-23 RX ADMIN — DIAZEPAM 5 MG: 5 TABLET ORAL at 13:47

## 2020-09-23 RX ADMIN — METOPROLOL TARTRATE 5 MG: 1 INJECTION, SOLUTION INTRAVENOUS at 13:10

## 2020-09-23 RX ADMIN — SODIUM CHLORIDE 1000 ML: 9 INJECTION, SOLUTION INTRAVENOUS at 05:55

## 2020-09-23 RX ADMIN — SODIUM CHLORIDE, PRESERVATIVE FREE 10 ML: 5 INJECTION INTRAVENOUS at 09:54

## 2020-09-23 RX ADMIN — FAMOTIDINE 20 MG: 10 INJECTION INTRAVENOUS at 20:12

## 2020-09-23 RX ADMIN — HEPARIN SODIUM 5000 UNITS: 5000 INJECTION INTRAVENOUS; SUBCUTANEOUS at 21:30

## 2020-09-23 RX ADMIN — HYDROMORPHONE HYDROCHLORIDE 0.5 MG: 1 INJECTION, SOLUTION INTRAMUSCULAR; INTRAVENOUS; SUBCUTANEOUS at 05:00

## 2020-09-23 RX ADMIN — METOPROLOL TARTRATE 5 MG: 1 INJECTION, SOLUTION INTRAVENOUS at 05:45

## 2020-09-23 RX ADMIN — HYDROMORPHONE HYDROCHLORIDE 0.25 MG: 1 INJECTION, SOLUTION INTRAMUSCULAR; INTRAVENOUS; SUBCUTANEOUS at 05:40

## 2020-09-23 RX ADMIN — FENTANYL CITRATE 50 MCG: 50 INJECTION INTRAMUSCULAR; INTRAVENOUS at 04:11

## 2020-09-23 RX ADMIN — HYDROMORPHONE HYDROCHLORIDE 0.5 MG: 1 INJECTION, SOLUTION INTRAMUSCULAR; INTRAVENOUS; SUBCUTANEOUS at 22:08

## 2020-09-23 RX ADMIN — SODIUM CHLORIDE, POTASSIUM CHLORIDE, SODIUM LACTATE AND CALCIUM CHLORIDE 1000 ML: 600; 310; 30; 20 INJECTION, SOLUTION INTRAVENOUS at 10:55

## 2020-09-23 RX ADMIN — FENTANYL CITRATE 50 MCG: 50 INJECTION INTRAMUSCULAR; INTRAVENOUS at 04:10

## 2020-09-23 RX ADMIN — SODIUM CHLORIDE, POTASSIUM CHLORIDE, SODIUM LACTATE AND CALCIUM CHLORIDE: 600; 310; 30; 20 INJECTION, SOLUTION INTRAVENOUS at 12:00

## 2020-09-23 RX ADMIN — FENTANYL CITRATE 100 MCG: 50 INJECTION INTRAMUSCULAR; INTRAVENOUS at 02:42

## 2020-09-23 RX ADMIN — Medication 0.2 MG: at 04:06

## 2020-09-23 RX ADMIN — FENTANYL CITRATE 50 MCG: 50 INJECTION INTRAMUSCULAR; INTRAVENOUS at 04:26

## 2020-09-23 RX ADMIN — SODIUM CHLORIDE: 9 INJECTION, SOLUTION INTRAVENOUS at 03:57

## 2020-09-23 RX ADMIN — IOHEXOL 50 ML: 240 INJECTION, SOLUTION INTRATHECAL; INTRAVASCULAR; INTRAVENOUS; ORAL at 00:59

## 2020-09-23 RX ADMIN — ONDANSETRON 4 MG: 2 INJECTION INTRAMUSCULAR; INTRAVENOUS at 04:05

## 2020-09-23 RX ADMIN — METOPROLOL TARTRATE 10 MG: 1 INJECTION, SOLUTION INTRAVENOUS at 16:43

## 2020-09-23 RX ADMIN — OXYCODONE HYDROCHLORIDE 5 MG: 5 TABLET ORAL at 13:47

## 2020-09-23 ASSESSMENT — PAIN DESCRIPTION - FREQUENCY
FREQUENCY: CONTINUOUS

## 2020-09-23 ASSESSMENT — PULMONARY FUNCTION TESTS
PIF_VALUE: 19
PIF_VALUE: 20
PIF_VALUE: 12
PIF_VALUE: 19
PIF_VALUE: 19
PIF_VALUE: 1
PIF_VALUE: 18
PIF_VALUE: 20
PIF_VALUE: 18
PIF_VALUE: 19
PIF_VALUE: 19
PIF_VALUE: 24
PIF_VALUE: 12
PIF_VALUE: 19
PIF_VALUE: 20
PIF_VALUE: 1
PIF_VALUE: 18
PIF_VALUE: 0
PIF_VALUE: 18
PIF_VALUE: 19
PIF_VALUE: 12
PIF_VALUE: 20
PIF_VALUE: 18
PIF_VALUE: 19
PIF_VALUE: 2
PIF_VALUE: 19
PIF_VALUE: 18
PIF_VALUE: 18
PIF_VALUE: 19
PIF_VALUE: 9
PIF_VALUE: 19
PIF_VALUE: 13
PIF_VALUE: 18
PIF_VALUE: 19
PIF_VALUE: 13
PIF_VALUE: 2
PIF_VALUE: 19
PIF_VALUE: 20
PIF_VALUE: 19
PIF_VALUE: 19
PIF_VALUE: 17
PIF_VALUE: 1
PIF_VALUE: 18
PIF_VALUE: 19
PIF_VALUE: 9
PIF_VALUE: 19
PIF_VALUE: 1
PIF_VALUE: 19
PIF_VALUE: 18
PIF_VALUE: 19
PIF_VALUE: 20
PIF_VALUE: 15
PIF_VALUE: 20
PIF_VALUE: 16
PIF_VALUE: 1
PIF_VALUE: 19
PIF_VALUE: 18
PIF_VALUE: 2
PIF_VALUE: 17
PIF_VALUE: 2
PIF_VALUE: 12
PIF_VALUE: 19
PIF_VALUE: 0
PIF_VALUE: 27
PIF_VALUE: 19
PIF_VALUE: 20
PIF_VALUE: 19
PIF_VALUE: 18
PIF_VALUE: 24
PIF_VALUE: 18
PIF_VALUE: 20
PIF_VALUE: 19
PIF_VALUE: 18
PIF_VALUE: 19
PIF_VALUE: 0
PIF_VALUE: 12
PIF_VALUE: 20
PIF_VALUE: 13
PIF_VALUE: 6
PIF_VALUE: 19
PIF_VALUE: 16
PIF_VALUE: 12
PIF_VALUE: 18
PIF_VALUE: 19
PIF_VALUE: 19
PIF_VALUE: 12
PIF_VALUE: 18
PIF_VALUE: 18
PIF_VALUE: 19
PIF_VALUE: 18
PIF_VALUE: 2
PIF_VALUE: 1

## 2020-09-23 ASSESSMENT — PAIN DESCRIPTION - PROGRESSION
CLINICAL_PROGRESSION: NOT CHANGED

## 2020-09-23 ASSESSMENT — PAIN SCALES - GENERAL
PAINLEVEL_OUTOF10: 7
PAINLEVEL_OUTOF10: 8
PAINLEVEL_OUTOF10: 9
PAINLEVEL_OUTOF10: 8
PAINLEVEL_OUTOF10: 6
PAINLEVEL_OUTOF10: 7
PAINLEVEL_OUTOF10: 9
PAINLEVEL_OUTOF10: 9
PAINLEVEL_OUTOF10: 8
PAINLEVEL_OUTOF10: 9
PAINLEVEL_OUTOF10: 8
PAINLEVEL_OUTOF10: 6
PAINLEVEL_OUTOF10: 8
PAINLEVEL_OUTOF10: 8
PAINLEVEL_OUTOF10: 6
PAINLEVEL_OUTOF10: 6
PAINLEVEL_OUTOF10: 8
PAINLEVEL_OUTOF10: 8
PAINLEVEL_OUTOF10: 9
PAINLEVEL_OUTOF10: 8
PAINLEVEL_OUTOF10: 9
PAINLEVEL_OUTOF10: 8

## 2020-09-23 ASSESSMENT — PAIN DESCRIPTION - LOCATION
LOCATION: ABDOMEN

## 2020-09-23 ASSESSMENT — PAIN DESCRIPTION - ONSET
ONSET: ON-GOING

## 2020-09-23 ASSESSMENT — PAIN DESCRIPTION - PAIN TYPE
TYPE: SURGICAL PAIN
TYPE: ACUTE PAIN
TYPE: SURGICAL PAIN

## 2020-09-23 ASSESSMENT — PAIN DESCRIPTION - ORIENTATION
ORIENTATION: MID

## 2020-09-23 ASSESSMENT — PAIN DESCRIPTION - DESCRIPTORS
DESCRIPTORS: CONSTANT;ACHING
DESCRIPTORS: CONSTANT;ACHING
DESCRIPTORS: CONSTANT
DESCRIPTORS: CONSTANT;ACHING
DESCRIPTORS: CONSTANT;ACHING
DESCRIPTORS: ACHING;CONSTANT
DESCRIPTORS: CONSTANT;ACHING
DESCRIPTORS: CONSTANT

## 2020-09-23 ASSESSMENT — PAIN - FUNCTIONAL ASSESSMENT: PAIN_FUNCTIONAL_ASSESSMENT: PREVENTS OR INTERFERES SOME ACTIVE ACTIVITIES AND ADLS

## 2020-09-23 NOTE — PROGRESS NOTES
DR Johnathon Tidwell AT BEDSIDE NOTIFIED OF B/P AND HEART RATE AND URINE OUTPUT-ORDERS TO GIVE LABETALOL

## 2020-09-23 NOTE — ANESTHESIA PROCEDURE NOTES
Arterial Line:    An arterial line was placed using ultrasound guidance and surface landmarks, in the OR for the following indication(s): continuous blood pressure monitoring and blood sampling needed. A 20 gauge (size), (length), Arrow (type) catheter was placed, Seldinger technique used, into the left radial artery, secured by Tegaderm and tape. Anesthesia type: General    Events:  patient tolerated procedure well with no complications.   Anesthesiologist: Tu Richards MD  Preanesthetic Checklist  Completed: patient identified, site marked, surgical consent, pre-op evaluation, timeout performed, IV checked, risks and benefits discussed, monitors and equipment checked, anesthesia consent given, oxygen available and patient being monitored

## 2020-09-23 NOTE — ED NOTES
Report to [facility], [facility]. No further questions at this time.      Luz Neal RN  09/23/20 [id number]

## 2020-09-23 NOTE — CONSULTS
Consult - General Surgery    PATIENT NAME: Delfin Denton II  AGE: 56 y.o. MEDICAL RECORD NO. [id number]  DATE: 9/23/2020  SURGEON: Dr. Lucian Simms: Unique Lee MD    Patient evaluated at the request of  Dr. Beulah Watson  Reason for evaluation: critical care management    Patient information was obtained from patient, past medical records and surgery resident. History/Exam limitations: none    IMPRESSION:   1. 56 y.o. male who is s/p exploratory laparotomy, transverse loop colostomy and LIOR drain placement    MEDICAL DECISION MAKING AND PLAN:   1. Neuro:  1. Pain control - fentanyl PCA until tolerating PO  2. CV:  1. Monitor HR - tachycardic 130s  1. EKG - sinus tachycardia  2. Lopressor 5q6hr (home dose 25 BID)  2. Monitor BP - hx HTN; continue to monitor  3. Pulm:  1. Maintain O2 sat >88%  2. Aggressive IS use - at least 10 times per hour  3. Acapella  4. GI:  1. Strict NPO per GS  2. Keep gastrostomy tube to gravity  3. Monitor ostomy output  4. Pepcid BID (on home protonix)  5. Renal:  1. Strict I/Os  2. Monitor UO - may need fluids if UO not adequate  3. Maintain hunter   4. IVF - Ns @ 150cc/hr; will decrease if UO increases   5. Replace electrolytes PRN  6. Heme/ID:  1. Afebrile  2. WBC 21  3. Cipro/flagyl (penicillin allergy)  4. Hgb 12  7. Endocrine:  1. DMII  2. Monitor BS q4hr - high dose sliding scale insulin  8. MSK:  1. PT/OT tomorrow  2. OOB and ambulate  9. Lines:  1. Art line  2. Hunter  3. PIV  10. Dispo:  1. Continue ICU care      HISTORY:   History of Chief Complaint:    Yael Ritter II is a 56 y.o. male who is POD0 s/p exploratory laparotomy, transverse loop colostomy and LIOR drain placement. Patient has a history of HTN, DM, hepatocellular CA. Patient on 9/2/20 underwent L nephrectomy with exploratory laparotomy and resection of the descending colon with primary anastomosis and additionally a resection of the proximal jejunum with primary anastomosis.  Patient was discharged on 9/11/20. He returned to the ED last night due to left flank pain that radiated towards his abdomen. Patient had minimal p.o. intake as well as nausea and vomiting. CT abdomen pelvis showed contrast extravasation the colonic anastomosis and therefore the patient was taken to the operating room. Patient was extubated and was brought to the ICU postoperatively due to persistent tachycardic. Given a liter bolus of NS this AM due to tachycardia - up to 150s. Additionally given lopressor with minimal improvement with HR 130s. Surgical critical care was therefore consulted for management while in the ICU. Patient at bedside states that he does have some abdominal pain. Ostomy in the right abdomen is pink with serosanguineous fluid in the ostomy bag.  LIOR drain with serosanguineous fluid. Past Medical History   has a past medical history of Back pain, Cellulitis, Diabetes mellitus ([address] Gout, History of kidney cancer, and Hypertension. Past Surgical History   has a past surgical history that includes Ankle surgery; knee surgery (Bilateral); Carpal tunnel release (Bilateral); Cystoscopy (Right, 9/1/2020); total nephrectomy (Left, 09/02/2020); Abdominal exploration surgery (09/02/2020); hc cath power picc triple (9/3/2020); Kidney surgery (Left, 9/2/2020); Small intestine surgery (N/A, 9/2/2020); and Abdomen surgery (09/23/2020). Medications  Prior to Admission medications    Medication Sig Start Date End Date Taking? Authorizing Provider   sodium bicarbonate 650 MG tablet Take 1 tablet by mouth 2 times daily 9/17/20   Jos Meredith MD   gabapentin (NEURONTIN) 300 MG capsule Take 1 capsule by mouth 3 times daily for 7 days.  9/11/20 9/18/20  Manuel Smith MD   insulin glargine (LANTUS SOLOSTAR) 100 UNIT/ML injection pen Inject 60 Units into the skin nightly 9/11/20   Manuel Smith MD   insulin lispro, 1 Unit Dial, (HUMALOG KWIKPEN) 100 UNIT/ML SOPN Inject 0-6 Units into the skin 3 times daily (before meals) **Corrective Low Dose Algorithm**  Glucose: Dose:               No Insulin  140-199 1 Unit  200-249 2 Units  250-299 3 Units  300-349 4 Units  350-399 5 Units  Over 399 6 Units 9/11/20   Lizzeth Jack MD   ferrous sulfate (IRON 325) 325 (65 Fe) MG tablet Take 1 tablet by mouth daily (with breakfast) 9/11/20   Lizzeth Jack MD   pantoprazole (PROTONIX) 40 MG tablet Take 1 tablet by mouth daily 9/11/20   Lizzeth Jack MD   blood glucose monitor strips Test 4 times a day & as needed for symptoms of irregular blood glucose. Dispense sufficient amount for indicated testing frequency plus additional to accommodate PRN testing needs. 9/11/20   Lizzeth Jack MD   Alcohol Swabs PADS 1 each by Does not apply route 4 times daily 9/11/20   Lizzeth Jack MD   Insulin Pen Needle ([employer] PEN NEEDLES) 31G X 6 MM MISC 1 each by Does not apply route daily 9/11/20   Lizzeth Jack MD   Lancets MISC 1 each by Does not apply route 3 times daily 9/11/20   Lizzeth Jack MD   glucose monitoring kit (FREESTYLE) monitoring kit 1 kit by Does not apply route daily 9/11/20   Lizzeth Jack MD   traMADol (ULTRAM) 50 MG tablet Take 100 mg by mouth every 6 hours as needed for Pain.     Historical Provider, MD   Melatonin 10 MG TABS Take 10 mg by mouth nightly as needed (SLEEP)    Historical Provider, MD   vitamin C (ASCORBIC ACID) 500 MG tablet Take 500 mg by mouth daily    Historical Provider, MD   acetaminophen (TYLENOL) 500 MG tablet Take 2 tablets by mouth every 6 hours as needed for Pain 8/11/20 8/21/20  Cem Orellana MD   cyclobenzaprine (FLEXERIL) 10 MG tablet Take 1 tablet by mouth 3 times daily as needed for Muscle spasms  Patient taking differently: Take 10 mg by mouth 2 times daily as needed for Muscle spasms  8/11/20   Cem Orellana MD   aspirin 81 MG tablet Take 81 mg by mouth nightly     Historical Provider, MD   pravastatin (PRAVACHOL) 20 MG tablet Take 20 mg by mouth daily    Historical Provider, MD   metoprolol (LOPRESSOR) (119.7 kg). His oral temperature is 98.3 °F (36.8 °C). His blood pressure is 125/89 and his pulse is 135. His respiration is 9 and oxygen saturation is 95%. CONSTITUTIONAL: Alert and oriented times 3, no acute distress and cooperative to examination. HEENT: Head is normocephalic, atraumatic. EOMI, PERRLA  NECK: Soft, trachea midline and straight  LUNGS: Chest expands equally bilaterally upon respiration, no accessory muscle used. CARDIOVASCULAR: Tachycardic with regular rhythm  ABDOMEN: soft, appropriately tender to palpation, distended. Midline incisional dressing is intact and dry. LIOR drain with serosanguineous fluid. Ostomy appears pink, healthy  NEUROLOGIC: CN II-XII are grossly intact.  There are no focalizing motor or sensory deficits  EXTREMITIES: no cyanosis, clubbing or edema    LABS:     Recent Labs     09/22/20 1720 09/22/20  2140 09/23/20  0306 09/23/20  0503 09/23/20  1432   WBC 13.6*  --   --  20.0* 21.0*   HGB 12.2*  --  10.1 11.3* 12.0*   HCT 39.7*  --  31.1 36.7* 39.2*   *  --   --  428 385   *  --  136 134* 137   K 5.1  --  4.2 4.7 5.7*     --   --  106 106   CO2 17*  --   --  17* 20   BUN 16  --   --  14 16   CREATININE 1.63*  --   --  1.23* 1.57*   CALCIUM 9.0  --   --  8.0* 8.0*   AST 32  --   --   --   --    ALT 21  --   --   --   --    BILITOT 0.47  --   --   --   --    NITRU  --  NEGATIVE  --   --   --    COLORU  --  YELLOW  --   --   --    BACTERIA  --  NOT REPORTED  --   --   --      Recent Labs     09/22/20 1720 09/23/20  1432   ALKPHOS 112  --    ALT 21  --    AST 32  --    BILITOT 0.47  --    LABALBU 3.1*  --    LIPASE 253* 198*     CBC with Differential:    Lab Results   Component Value Date    WBC 21.0 09/23/2020    RBC 4.37 09/23/2020    HGB 12.0 09/23/2020    HCT 39.2 09/23/2020     09/23/2020    MCV 89.7 09/23/2020    MCH 27.5 09/23/2020    MCHC 30.6 09/23/2020    RDW 15.3 09/23/2020    LYMPHOPCT 3 09/23/2020    MONOPCT 7 09/23/2020    BASOPCT 0 09/23/2020    MONOSABS 1.47 09/23/2020    LYMPHSABS 0.63 09/23/2020    EOSABS 0.00 09/23/2020    BASOSABS 0.00 09/23/2020    DIFFTYPE NOT REPORTED 09/23/2020     BMP:    Lab Results   Component Value Date     09/23/2020    K 5.7 09/23/2020     09/23/2020    CO2 20 09/23/2020    BUN 16 09/23/2020    LABALBU 3.1 09/22/2020    CREATININE 1.57 09/23/2020    CALCIUM 8.0 09/23/2020    GFRAA 55 09/23/2020    LABGLOM 45 09/23/2020    GLUCOSE 189 09/23/2020     Hepatic Function Panel:    Lab Results   Component Value Date    ALKPHOS 112 09/22/2020    ALT 21 09/22/2020    AST 32 09/22/2020    PROT 8.4 09/22/2020    BILITOT 0.47 09/22/2020    BILIDIR 0.24 09/02/2020    IBILI 0.19 09/02/2020    LABALBU 3.1 09/22/2020     Ionized Calcium:  No results found for: IONCA  Magnesium:    Lab Results   Component Value Date    MG 2.0 09/16/2020     Phosphorus:    Lab Results   Component Value Date    PHOS 3.1 09/11/2020         RADIOLOGY:   CT scan abd/pelvis:     Ct Abdomen Pelvis Wo Contrast Additional Contrast? Oral And Rectal (water Soluble Contrast Through G Tube And Rectal)    Result Date: 9/23/2020  EXAMINATION: CT OF THE ABDOMEN AND PELVIS WITHOUT CONTRAST 9/23/2020 12:29 am TECHNIQUE: CT of the abdomen and pelvis was performed without the administration of intravenous contrast. Multiplanar reformatted images are provided for review. Dose modulation, iterative reconstruction, and/or weight based adjustment of the mA/kV was utilized to reduce the radiation dose to as low as reasonably achievable. COMPARISON: 09/22/2020 from 4 hours prior and 09/07/2020 HISTORY: ORDERING SYSTEM PROVIDED HISTORY: rule out leak from small bowel anastomosis vs colocolonic anastomosis. TECHNOLOGIST PROVIDED HISTORY: Gastrografin through G-tube and rectally. rule out leak from small bowel anastomosis vs colocolonic anastomosis.  Reason for Exam: recent colon surgery Patient is 3 weeks postop status post left nephrectomy and partial colon resection for renal cell carcinoma FINDINGS: Lower Chest: Again seen is a small left effusion and left basilar atelectasis. Organs: The solid organs are incompletely evaluated without intravenous contrast.  Status post left nephrectomy. A right nephroureteral stent is present in appropriate position. The liver, spleen, pancreas, and adrenal glands are without acute findings. GI/Bowel: Status post administration of oral and rectal contrast.  A percutaneous gastrostomy tube is seen within the stomach. There appears to be leakage of contrast from the colonic anastomosis (series 2, image 144). An adjacent collection of fluid and gas appears to be extraluminal and intraperitoneal, measuring approximately 11 x 11 x 4.1 cm, but likely at least in part free-flowing. Pelvis: The urinary bladder is partially distended without contour abnormality. No acute abnormality of the prostate or seminal vesicles. Peritoneum/Retroperitoneum: As before, there is extensive intraperitoneal free fluid and moderate pneumoperitoneum. Infiltration of the omentum and left retroperitoneum is likely reactive. Bones/Soft Tissues: Heterogeneous appearance of the osseous structures slightly limits evaluation for aggressive osseous lesions. No obvious osseous metastases are identified. No acute bony abnormality. There is a healing midline surgical wound without evidence of complication. 1. Leakage of contrast at the colocolonic anastomosis with a large adjacent collection of fluid and gas. 2. Reactive infiltration of the omentum and left retroperitoneum. Ct Abdomen Pelvis Wo Contrast Additional Contrast? None    Result Date: 9/22/2020  EXAMINATION: CT OF THE ABDOMEN AND PELVIS WITHOUT CONTRAST 9/22/2020 7:39 pm TECHNIQUE: CT of the abdomen and pelvis was performed without the administration of intravenous contrast. Multiplanar reformatted images are provided for review.  Dose modulation, iterative reconstruction, and/or weight based adjustment of the mA/kV was utilized to reduce the radiation dose to as low as reasonably achievable. COMPARISON: 09/07/2020 HISTORY: ORDERING SYSTEM PROVIDED HISTORY: Recent nephrectomy and bowel anastomoses, left flank and abdominal pain, nausea vomiting TECHNOLOGIST PROVIDED HISTORY: Recent nephrectomy and bowel anastomoses, left flank and abdominal pain, nausea vomiting Reason for Exam: recent nephrectomy and bowel anastomoses, left flank and abdominal pain, nausea/vomiting Acuity: Unknown Type of Exam: Unknown FINDINGS: Lower Chest: Small left pleural effusion adjacent left lower lobe airspace disease, likely atelectasis. Subtle right basilar atelectasis/scarring. Base the heart is normal in size without pericardial fluid collection. Organs: Perihepatic fluid noted. No focal hepatic lesions. The gallbladder, pancreas, and spleen are grossly stable. There are new foci of intraperitoneal air surrounding liver and spleen. GI/Bowel: Status post partial colectomy and left lower quadrant and anastomosis. No obvious bowel obstruction identified, however there is increasing free intraperitoneal air as compared to prior study of 09/07/2020. Correlation for interval surgical instrumentation recommended. Small amount of pelvic free fluid and fluid within the right pericolic gutter. Persistent ill-defined patchy mesenteric edema identified primarily in the left lower quadrant. Pelvis: Small amount of pelvic free fluid. Peritoneum/Retroperitoneum: Status post left nephrectomy. Adrenal glands are normal in size and configuration. Right-sided ureteral stent noted in place. Urinary bladder is otherwise grossly unremarkable. Bones/Soft Tissues: Advanced multilevel degenerative disc disease essentially throughout the visualized thoracolumbar spine. Subcortical endplate sclerosis and irregularity within multiple lower lumbar spine vertebral bodies. Advanced facet arthropathy within the mid to lower lumbar spine.

## 2020-09-23 NOTE — H&P
LAPAROSCOPIC XI ROBOTIC NEPHRECTOMY performed by Satya Funes MD at [facility] Bilateral    [address] N/A 9/2/2020    EXPLORATORY LAPAROTOMY, RESECTION OF PROXIMAL JEJUNUM AND DESCENDING COLON WITH MOBILIZATION OF SPLENIC FLEXURE AND PRIMARY ANASTAMOSISOF SMALL BOWEL AND COLON, PLACEMENT OF GASTROSTOMY TUBE performed by Santos Jacob DO at [address] Left 09/02/2020    LAPAROSCOPIC XI ROBOTIC NEPHRECTOMY         Medications Prior to Admission:       Prior to Admission medications    Medication Sig Start Date End Date Taking? Authorizing Provider   sodium bicarbonate 650 MG tablet Take 1 tablet by mouth 2 times daily 9/17/20   Efe Guerrero MD   gabapentin (NEURONTIN) 300 MG capsule Take 1 capsule by mouth 3 times daily for 7 days. 9/11/20 9/18/20  Bijan Otero MD   insulin glargine (LANTUS SOLOSTAR) 100 UNIT/ML injection pen Inject 60 Units into the skin nightly 9/11/20   Bijan Otero MD   insulin lispro, 1 Unit Dial, (HUMALOG KWIKPEN) 100 UNIT/ML SOPN Inject 0-6 Units into the skin 3 times daily (before meals) **Corrective Low Dose Algorithm**  Glucose: Dose:               No Insulin  140-199 1 Unit  200-249 2 Units  250-299 3 Units  300-349 4 Units  350-399 5 Units  Over 399 6 Units 9/11/20   Bijan Otero MD   ferrous sulfate (IRON 325) 325 (65 Fe) MG tablet Take 1 tablet by mouth daily (with breakfast) 9/11/20   Bijan Otero MD   pantoprazole (PROTONIX) 40 MG tablet Take 1 tablet by mouth daily 9/11/20   Bijan Otero MD   blood glucose monitor strips Test 4 times a day & as needed for symptoms of irregular blood glucose. Dispense sufficient amount for indicated testing frequency plus additional to accommodate PRN testing needs.  9/11/20   Bijan Otero MD   Alcohol Swabs PADS 1 each by Does not apply route 4 times daily 9/11/20   Bijan Otero MD   Insulin Pen Needle ([employer] PEN NEEDLES) 31G X 6 MM MISC 1 each by Does not apply route daily 9/11/20   Bijan Otero MD   Lancets MISC 1 each by Does not apply route 3 times daily 9/11/20   Esther Downs MD   glucose monitoring kit (FREESTYLE) monitoring kit 1 kit by Does not apply route daily 9/11/20   Esther Downs MD   traMADol (ULTRAM) 50 MG tablet Take 100 mg by mouth every 6 hours as needed for Pain. Historical Provider, MD   Melatonin 10 MG TABS Take 10 mg by mouth nightly as needed (SLEEP)    Historical Provider, MD   vitamin C (ASCORBIC ACID) 500 MG tablet Take 500 mg by mouth daily    Historical Provider, MD   acetaminophen (TYLENOL) 500 MG tablet Take 2 tablets by mouth every 6 hours as needed for Pain 8/11/20 8/21/20  Eze Slaughter MD   cyclobenzaprine (FLEXERIL) 10 MG tablet Take 1 tablet by mouth 3 times daily as needed for Muscle spasms  Patient taking differently: Take 10 mg by mouth 2 times daily as needed for Muscle spasms  8/11/20   Eze Slaughter MD   aspirin 81 MG tablet Take 81 mg by mouth nightly     Historical Provider, MD   pravastatin (PRAVACHOL) 20 MG tablet Take 20 mg by mouth daily    Historical Provider, MD   metoprolol (LOPRESSOR) 50 MG tablet Take 50 mg by mouth 2 times daily    Historical Provider, MD   allopurinol (ZYLOPRIM) 100 MG tablet Take 100 mg by mouth daily    Historical Provider, MD   zolpidem (AMBIEN) 10 MG tablet Take by mouth nightly as needed for Sleep    Historical Provider, MD   hydrALAZINE (APRESOLINE) 25 MG tablet Take 25 mg by mouth 3 times daily     Historical Provider, MD        Allergies:       Ampicillin; Pcn [penicillins]; and Tape [adhesive tape]    Social History:     Tobacco:    reports that he has never smoked. He has never used smokeless tobacco.  Alcohol:      reports current alcohol use. Drug Use:  reports no history of drug use.     Family History:     Family History   Problem Relation Age of Onset    Stroke Maternal Grandmother     Stroke Maternal Grandfather        Review of Systems:     Positive and Negative as described in HPI    Constitutional: Positive for  fevers, chills, sweats, fatigue, and weight loss  HEENT:  negative for vision or hearing changes,   Respiratory:  negative for shortness of breath, cough, or congestion  Cardiovascular:  negative for  chest pain, palpitations  Gastrointestinal: See history of present illness  Genitourinary: See history of present illness  Integument/Breast:  negative for rash, skin lesions  Musculoskeletal:  negative for muscle aches or joint pain  Neurological:  negative for headaches, dizziness, lightheadedness, numbness, pain and tingling extrimities  Behavior/Psych:  negative for depression and anxiety    Code Status:  Full Code    Physical Exam:     Vitals:  /89   Pulse 133   Temp 98.3 °F (36.8 °C) (Oral)   Resp 9   Ht 5' 11\" (1.803 m)   Wt 263 lb 14.3 oz (119.7 kg)   SpO2 94%   BMI 36.81 kg/m²   Temp (24hrs), Av.8 °F (37.1 °C), Min:97.1 °F (36.2 °C), Max:99.9 °F (37.7 °C)      General appearance - alert, ill appearing, and in some acute distress acute distress chiefly with postop pain  Mental status - oriented to person, place, and time with normal affect  Head - normocephalic and atraumatic  Eyes - pupils equal and reactive, extraocular eye movements intact, conjunctiva clear  Ears - hearing appears to be intact  Nose - no drainage noted  Mouth - mucous membranes moist  Neck - supple, no carotid bruits, thyroid not palpable  Chest - clear to auscultation and percussion  normal effort  Heart - normal rate, regular rhythm, no murmurs  Abdomen - soft, tender, nondistended, bowel sounds present all four quadrants, no masses, hepatomegaly or splenomegaly  Neurological - normal speech, no focal findings or movement disorder noted, cranial nerves II through XII grossly intact  Extremities - peripheral pulses palpable, no pedal edema or calf pain with palpation  Skin - no gross lesions, rashes, or induration noted        Data:     Daily Labs for 3 Days:    Hematology:  Recent Labs     20  1720 09/23/20  0306 09/23/20  0503 09/23/20  1432   WBC 13.6*  --  20.0* 21.0*   HGB 12.2* 10.1 11.3* 12.0*   HCT 39.7* 31.1 36.7* 39.2*   *  --  428 385     Chemistry:  Recent Labs     09/22/20  1720 09/23/20  0306 09/23/20  0503 09/23/20  1432   * 136 134* 137   K 5.1 4.2 4.7 5.7*     --  106 106   CO2 17*  --  17* 20   GLUCOSE 201*  --  174* 189*   BUN 16  --  14 16   CREATININE 1.63*  --  1.23* 1.57*   CALCIUM 9.0  --  8.0* 8.0*   CAION  --  1.22  --   --      Recent Labs     09/22/20  1720 09/23/20  1432   PROT 8.4*  --    LABALBU 3.1*  --    AST 32  --    ALT 21  --    ALKPHOS 112  --    BILITOT 0.47  --    LIPASE 253* 198*         Assesment:     Primary Problem  <principal problem not specified>  Abdominal pain-free air in the peritoneum  Type 2 diabetes mellitus  DEBBI on CKI  Hyperkalemia     There are no active hospital problems to display for this patient. Plan:     1. Admitted in the ICU with critical care and surgery consults  2. See orders  3. Repeat electrolytes  4.  Nephrology consult      Electronically signed by Meaghan Burger MD on 9/23/2020 at 6:22 PM     Copy sent to Dr. Meaghan Burger MD

## 2020-09-23 NOTE — ED PROVIDER NOTES
[address] [facility]  Emergency Department  Emergency Medicine Resident Sign-out     Care of Chase Alvarado II was assumed from Dr. Mikael Gomez and is being seen for Abdominal Pain and Nausea  . The patient's initial evaluation and plan have been discussed with the prior provider who initially evaluated the patient.      EMERGENCY DEPARTMENT COURSE / MEDICAL DECISION MAKING:       MEDICATIONS GIVEN:  Orders Placed This Encounter   Medications    ondansetron (ZOFRAN) injection 4 mg    0.9 % sodium chloride bolus    morphine injection 4 mg    morphine injection 4 mg    lactated ringers bolus    HYDROmorphone (DILAUDID) injection 0.5 mg    cefepime (MAXIPIME) 1 g IVPB minibag    metronidazole (FLAGYL) 500 mg in NaCl 100 mL IVPB premix    HYDROmorphone (DILAUDID) injection 1 mg    lactated ringers bolus    iohexol (OMNIPAQUE 240) injection 50 mL    HYDROmorphone (DILAUDID) injection 1 mg    HYDROmorphone (DILAUDID) 1 MG/ML injection     Cassi Riddle: cabinet override       LABS / RADIOLOGY:     Results for orders placed or performed during the hospital encounter of 09/22/20   CBC Auto Differential   Result Value Ref Range    WBC 13.6 (H) 3.5 - 11.3 k/uL    RBC 4.51 4.21 - 5.77 m/uL    Hemoglobin 12.2 (L) 13.0 - 17.0 g/dL    Hematocrit 39.7 (L) 40.7 - 50.3 %    MCV 88.0 82.6 - 102.9 fL    MCH 27.1 25.2 - 33.5 pg    MCHC 30.7 28.4 - 34.8 g/dL    RDW 15.0 (H) 11.8 - 14.4 %    Platelets 361 (H) 769 - 453 k/uL    MPV 10.7 8.1 - 13.5 fL    NRBC Automated 0.0 0.0 per 100 WBC    Differential Type NOT REPORTED     WBC Morphology NOT REPORTED     RBC Morphology ANISOCYTOSIS PRESENT     Platelet Estimate NOT REPORTED     Seg Neutrophils 85 (H) 36 - 65 %    Lymphocytes 7 (L) 24 - 43 %    Monocytes 7 3 - 12 %    Eosinophils % 0 (L) 1 - 4 %    Basophils 1 0 - 2 %    Immature Granulocytes 1 (H) 0 %    Segs Absolute 11.48 (H) 1.50 - 8.10 k/uL    Absolute Lymph # 0.94 (L) 1.10 - 3.70 k/uL    Absolute Mono # Range    POC Chloride 105 98 - 107 mmol/L   CALCIUM, IONIC (POC)   Result Value Ref Range    POC Ionized Calcium 1.25 1.15 - 1.33 mmol/L   COVID-19    Specimen: Other   Result Value Ref Range    SARS-CoV-2          SARS-CoV-2, Rapid Not Detected Not Detected    Source . NASOPHARYNGEAL SWAB     SARS-CoV-2         Microscopic Urinalysis   Result Value Ref Range    -          WBC, UA 10 TO 20 0 - 5 /HPF    RBC, UA 2 TO 5 0 - 4 /HPF    Casts UA  0 - 8 /LPF     0 TO 2 HYALINE Reference range defined for non-centrifuged specimen. Crystals, UA NOT REPORTED None /HPF    Epithelial Cells UA 0 TO 2 0 - 5 /HPF    Renal Epithelial, UA NOT REPORTED 0 /HPF    Bacteria, UA NOT REPORTED None    Mucus, UA NOT REPORTED None    Trichomonas, UA NOT REPORTED None    Amorphous, UA NOT REPORTED None    Other Observations UA NOT REPORTED NOT REQ.     Yeast, UA NOT REPORTED None   Procalcitonin   Result Value Ref Range    Procalcitonin 0.18 (H) <0.09 ng/mL   Venous Blood Gas, POC   Result Value Ref Range    pH, Yamil 7.445 (H) 7.320 - 7.430    pCO2, Yamil 29.4 (L) 41.0 - 51.0 mm Hg    pO2, Yamil 39.2 30.0 - 50.0 mm Hg    HCO3, Venous 20.2 (L) 22.0 - 29.0 mmol/L    Total CO2, Venous 21 (L) 23.0 - 30.0 mmol/L    Negative Base Excess, Yamil 3 (H) 0.0 - 2.0    Positive Base Excess, Yamil NOT REPORTED 0.0 - 3.0    O2 Sat, Yamil 77 60.0 - 85.0 %    O2 Device/Flow/% NOT REPORTED     Terrell Test NOT REPORTED     Sample Site NOT REPORTED     Mode NOT REPORTED     FIO2 NOT REPORTED     Pt Temp NOT REPORTED     POC pH Temp NOT REPORTED     POC pCO2 Temp NOT REPORTED mm Hg    POC pO2 Temp NOT REPORTED mm Hg   Creatinine W/GFR Point of Care   Result Value Ref Range    POC Creatinine 1.56 (H) 0.51 - 1.19 mg/dL    GFR Comment 55 (L) >60 mL/min    GFR Non- 45 (L) >60 mL/min    GFR Comment         Lactic Acid, POC   Result Value Ref Range    POC Lactic Acid 1.24 0.56 - 1.39 mmol/L   POCT Glucose   Result Value Ref Range    POC Glucose 222 (H) 74 - 100 mg/dL   Anion Gap (Calc) POC   Result Value Ref Range    Anion Gap 13 7 - 16 mmol/L       Ct Abdomen Pelvis Wo Contrast Additional Contrast? None    Result Date: 9/22/2020  EXAMINATION: CT OF THE ABDOMEN AND PELVIS WITHOUT CONTRAST 9/22/2020 7:39 pm TECHNIQUE: CT of the abdomen and pelvis was performed without the administration of intravenous contrast. Multiplanar reformatted images are provided for review. Dose modulation, iterative reconstruction, and/or weight based adjustment of the mA/kV was utilized to reduce the radiation dose to as low as reasonably achievable. COMPARISON: 09/07/2020 HISTORY: ORDERING SYSTEM PROVIDED HISTORY: Recent nephrectomy and bowel anastomoses, left flank and abdominal pain, nausea vomiting TECHNOLOGIST PROVIDED HISTORY: Recent nephrectomy and bowel anastomoses, left flank and abdominal pain, nausea vomiting Reason for Exam: recent nephrectomy and bowel anastomoses, left flank and abdominal pain, nausea/vomiting Acuity: Unknown Type of Exam: Unknown FINDINGS: Lower Chest: Small left pleural effusion adjacent left lower lobe airspace disease, likely atelectasis. Subtle right basilar atelectasis/scarring. Base the heart is normal in size without pericardial fluid collection. Organs: Perihepatic fluid noted. No focal hepatic lesions. The gallbladder, pancreas, and spleen are grossly stable. There are new foci of intraperitoneal air surrounding liver and spleen. GI/Bowel: Status post partial colectomy and left lower quadrant and anastomosis. No obvious bowel obstruction identified, however there is increasing free intraperitoneal air as compared to prior study of 09/07/2020. Correlation for interval surgical instrumentation recommended. Small amount of pelvic free fluid and fluid within the right pericolic gutter. Persistent ill-defined patchy mesenteric edema identified primarily in the left lower quadrant. Pelvis: Small amount of pelvic free fluid.  Peritoneum/Retroperitoneum: Status post left nephrectomy. Adrenal glands are normal in size and configuration. Right-sided ureteral stent noted in place. Urinary bladder is otherwise grossly unremarkable. Bones/Soft Tissues: Advanced multilevel degenerative disc disease essentially throughout the visualized thoracolumbar spine. Subcortical endplate sclerosis and irregularity within multiple lower lumbar spine vertebral bodies. Advanced facet arthropathy within the mid to lower lumbar spine. Bilateral severe neural foraminal stenosis at L4-L5 and probably also at L3 through S1. Increasing free intraperitoneal air and fluid as compared to previous study of 09/07/2020. No obvious bowel obstruction or inflammation identified. Correlate for interval surgical instrumentation to the abdomen. Multiple additional chronic findings as described above. Ct Abdomen Pelvis Wo Contrast Additional Contrast? Oral And Rectal    Result Date: 9/7/2020  EXAMINATION: CT OF THE ABDOMEN AND PELVIS WITHOUT CONTRAST 9/7/2020 11:39 am TECHNIQUE: CT of the abdomen and pelvis was performed without the administration of intravenous contrast. Multiplanar reformatted images are provided for review. Dose modulation, iterative reconstruction, and/or weight based adjustment of the mA/kV was utilized to reduce the radiation dose to as low as reasonably achievable. COMPARISON: 08/31/2020 HISTORY: ORDERING SYSTEM PROVIDED HISTORY: s/p sbr and partial colectomy. Increased wbc, and tachycardia. R/o Leak vs abscess. TECHNOLOGIST PROVIDED HISTORY: s/p sbr and partial colectomy. Increased wbc, and tachycardia. R/o Leak vs abscess. FINDINGS: LIMITED EVALUATION IN THE ABSENCE OF IV CONTRAST. Lower Chest: Small left pleural effusion and subsegmental atelectasis. Trace right pleural effusion and a smaller subsegmental atelectasis. Inferior lingular subsegmental atelectasis. Organs: The liver, spleen, pancreas and gallbladder show no significant abnormalities.   There is some colon downstream from the colonic anastomosis probably combination of stool and blood products 4. Percutaneous gastrostomy tube in place. 5. Small left pleural effusion and subsegmental atelectasis. Trace right pleural effusion and subsegmental atelectasis. Ct Abdomen Pelvis Wo Contrast    Result Date: 9/1/2020  EXAMINATION: CT OF THE ABDOMEN AND PELVIS WITHOUT CONTRAST 8/31/2020 3:20 pm TECHNIQUE: CT of the abdomen and pelvis was performed without the administration of intravenous contrast. Multiplanar reformatted images are provided for review. Dose modulation, iterative reconstruction, and/or weight based adjustment of the mA/kV was utilized to reduce the radiation dose to as low as reasonably achievable. COMPARISON: 8/21/2020 HISTORY: ORDERING SYSTEM PROVIDED HISTORY: Abdominal Pain TECHNOLOGIST PROVIDED HISTORY: Abdominal Pain Reason for Exam: Pt states vomiting since last night. Pt states known left renal mass to be removed in 2 days. Acuity: Acute Type of Exam: Initial FINDINGS: Lower Chest: No acute infiltrate at the lung bases. Organs: The unenhanced liver, spleen, pancreas and adrenal glands are unremarkable. No acute biliary findings. The right kidney is unremarkable. There is an infiltrative mass in the lower pole of the left kidney, previously partially visualized on CT of the right hip. This measures approximately 4.8 x 6.9 x 9.9 cm. The mass infiltrates the mesentery and results in proximal small bowel obstruction. There is a small middle pole left renal cyst.  Nonobstructive lower pole calculus measuring 6 x 12 mm. GI/Bowel: Marked gastric and duodenal distention. There is proximal small-bowel obstruction due to the infiltrative lower pole left renal mass. The distal small bowel is nondistended. No pericolonic inflammatory changes. Pelvis: Trace free pelvic fluid. No pelvic mass is identified. The prostate is not enlarged. Partial distention of the urinary bladder. Peritoneum/Retroperitoneum: The abdominal aorta is normal in caliber. 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 No pathologic retroperitoneal adenopathy. Bones/Soft Tissues: No acute osseous or soft tissue abnormality. No lytic or blastic osseous lesions. Multilevel degenerative changes in the lower thoracic and lumbar spine. 1. Proximal small-bowel obstruction due to extension of an infiltrative lower pole left renal mass to the mesentery and adjacent small bowel. There is marked gastric distension. 2. Infiltrative lower pole left renal mass, presumed malignant measuring 4.8 x 6.9 x 9.9 cm. Nonobstructive left nephrolithiasis. 3. Trace free pelvic fluid. Findings were discussed with Valentin Will at 3:37 pm on 8/31/2020. Xr Abdomen (kub) (single Ap View)    Addendum Date: 9/1/2020    ADDENDUM: Dose area product 1037.1 mGy per cm squared and fluoro time 5.6 seconds     Result Date: 9/1/2020  EXAMINATION: ONE SUPINE XRAY VIEW(S) OF THE ABDOMEN 9/1/2020 9:32 pm COMPARISON: 16 hours ago HISTORY: ORDERING SYSTEM PROVIDED HISTORY: cysto with right stent TECHNOLOGIST PROVIDED HISTORY: cysto with right stent Reason for Exam: cysto Acuity: Acute Type of Exam: Initial FINDINGS: Double-J ureteral stent present. On the contralateral side there appears to be a nephrolith. Please correlate with clinical service     Xr Acute Abd Series Chest 1 Vw    Result Date: 8/31/2020  EXAMINATION: TWO XRAY VIEWS OF THE ABDOMEN AND SINGLE  XRAY VIEW OF THE CHEST 8/31/2020 2:25 pm COMPARISON: None. HISTORY: ORDERING SYSTEM PROVIDED HISTORY: Pain TECHNOLOGIST PROVIDED HISTORY: Pain Reason for Exam: pt states vomiting and abdomen pain x 2 days Acuity: Acute Type of Exam: Initial FINDINGS: The cardiomediastinal silhouette is unremarkable. The lungs are clear. No infiltrate, pleural fluid or focal process is seen. Moderate aortic tortuosity, particularly involving the ascending aorta.  5 x 11 mm calcification on the left at the L3 vertebral level, possibly within the left renal pelvis or proximal left ureter. No other plain film evidence of renal or ureteral calculi. Bowel gas pattern is nonspecific. No focally distended loops, air-fluid levels or free air. Mild gastric distension. Multilevel osteoarthritic spurring in the lumbar spine. No acute cardiopulmonary disease. 5 x 11 mm calcification, possibly a renal or proximal ureteral calculus. Nonspecific bowel-gas pattern. Us Renal Complete    Result Date: 9/16/2020  EXAMINATION: RETROPERITONEAL ULTRASOUND OF THE KIDNEYS AND URINARY BLADDER 9/16/2020 COMPARISON: CT abdomen and pelvis September 7, 2020 HISTORY: ORDERING SYSTEM PROVIDED HISTORY: DEBBI, solitary right kidney, known right ureteral stent TECHNOLOGIST PROVIDED HISTORY: DEBBI, solitary right kidney, known right ureteral stent FINDINGS: Kidneys: The right kidney measures 11.7 cm in length and the left kidney is not visualized. Kidney demonstrate normal cortical echogenicity. No evidence of hydronephrosis or intrarenal stones. Right upper quadrant ascites. Bladder: Unremarkable appearance of the bladder. No significant post void residual. Stent noted in the bladder. Left kidney not visualized. Stent noted in the bladder. Xr Chest Portable    Result Date: 9/11/2020  EXAMINATION: ONE XRAY VIEW OF THE CHEST 9/11/2020 9:54 am COMPARISON: Chest radiograph performed 09/03/2020. HISTORY: ORDERING SYSTEM PROVIDED HISTORY: Elevated WBCs. ? aspiration TECHNOLOGIST PROVIDED HISTORY: Elevated WBCs. ? aspiration FINDINGS: There is a left basilar effusion with adjacent infiltrate. There is no pneumothorax. The mediastinal structures are unremarkable. The upper abdomen unremarkable. The extrathoracic soft tissues are unremarkable. There is a right-sided PICC line with the tip in the mid SVC. Left basilar effusion with adjacent infiltrate representing atelectasis versus pneumonia.  Right-sided PICC line with the tip in the mid SVC. Xr Chest Portable    Result Date: 9/3/2020  EXAMINATION: ONE XRAY VIEW OF THE CHEST 9/3/2020 6:17 am COMPARISON: September 1, 2020, chest exam HISTORY: ORDERING SYSTEM PROVIDED HISTORY: sepsis TECHNOLOGIST PROVIDED HISTORY: sepsis Reason for Exam: supine port Acuity: Unknown Type of Exam: Unknown FINDINGS: Interval insertion of ETT, tip is projected 3.6 cm cranial to the cecily Stable normal cardiopericardial silhouette Expiratory exam with interval increase in now mild streaky bibasilar atelectasis Degenerative changes of the thoracic spine/left shoulder     Line placement as above Expiratory exam with interval increase in now mild streaky bibasilar atelectasis     Xr Chest Portable    Result Date: 9/1/2020  EXAMINATION: ONE XRAY VIEW OF THE CHEST 9/1/2020 8:31 pm COMPARISON: 05/14/2010 HISTORY: ORDERING SYSTEM PROVIDED HISTORY: evaluation TECHNOLOGIST PROVIDED HISTORY: evaluation Reason for Exam: evaluation Acuity: Acute Type of Exam: Initial FINDINGS: Cardiomediastinal silhouette is unchanged in size. No pulmonary consolidation, pleural effusion, or pneumothorax. No acute osseous abnormality. Enteric tube is below the diaphragm with tip outside the field of view. No acute cardiopulmonary abnormality. Xr Abdomen For Ng/og/ne Tube Placement    Result Date: 9/2/2020  EXAMINATION: ONE SUPINE XRAY VIEW(S) OF THE ABDOMEN 9/2/2020 9:16 pm COMPARISON: 09/01/2020 HISTORY: ORDERING SYSTEM PROVIDED HISTORY: Confirmation of course of NG/OG/NE tube and location of tip of tube TECHNOLOGIST PROVIDED HISTORY: Confirmation of course of NG/OG/NE tube and location of tip of tube Portable? ->Yes Reason for Exam: ng placement FINDINGS: Enteric tube terminates at the GE junction. Side port is in the distal thoracic esophagus. Postoperative changes in the left upper quadrant. Nonobstructive bowel gas pattern. Right ureteral stent is partially visualized.      Enteric tube terminates at the GE junction. Recommend advancement prior to use. Xr Abdomen For Ng/og/ne Tube Placement    Result Date: 9/1/2020  EXAMINATION: ONE SUPINE XRAY VIEW(S) OF THE ABDOMEN 9/1/2020 6:26 am COMPARISON: 08/31/2020 HISTORY: ORDERING SYSTEM PROVIDED HISTORY: Confirmation of course of NG/OG/NE tube and location of tip of tube TECHNOLOGIST PROVIDED HISTORY: Confirmation of course of NG/OG/NE tube and location of tip of tube Portable? ->Yes Reason for Exam: ng placement Acuity: Unknown Type of Exam: Unknown FINDINGS: The nasogastric tube proximal side port is in the proximal stomach. The distal tip is in the mid gastric body. There is no evidence of bowel obstruction. There is a stable left renal calculus. No acute osseous abnormality is seen. The distal end of the nasogastric tube is in the mid gastric body. Fluoro For Surgical Procedures    Result Date: 9/1/2020  Radiology exam is complete. No Radiologist dictation. Please follow up with ordering provider. Xr Abdomen (2 Views)    Result Date: 9/7/2020  EXAMINATION: TWO XRAY VIEWS OF THE ABDOMEN 9/7/2020 9:02 am COMPARISON: 09/02/2020 HISTORY: ORDERING SYSTEM PROVIDED HISTORY: abd distention TECHNOLOGIST PROVIDED HISTORY: abd distention Reason for Exam: port supine and uprt abd Type of Exam: Subsequent/Follow-up FINDINGS: Paucity of small bowel gas. No small bowel distension. Gas is noted throughout the colon and also in the rectum. No suspicious masses. Right ureteric stent on prior has been removed and skin staples also no longer present. Bones grossly intact. Normal bowel gas pattern. RECENT VITALS:     Temp: 99.9 °F (37.7 °C),  Pulse: 132, Resp: 30, BP: (!) 131/102, SpO2: 94 %    This patient is a 56 y.o. Male postop day 21 from a radical left nephrectomy as well as a partial large bowel resection. Today with abdominal pain and to be tachycardic with leukocytosis and normal lactic.   Imaging showing increased peritoneal

## 2020-09-23 NOTE — PROGRESS NOTES
Arterial Line Placement Procedure Note          Performed by: Tim Foster MD               Indication: cardiovascular instability    Consent: The patient provided verbal consent for this procedure. Terrell's Test: Normal    Time out performed: Immediately prior to the procedure a \"time out\" was called to verify the correct patient, the correct procedure, equipment, support staff and site/side marked as required. All elements of maximal sterile barrier techniques were followed. Procedure: The skin over the right radial artery was prepped with betadine and draped in a sterile fashion. Local anesthesia was not performed. A 20 gauge angiocath was then inserted, over a needle, into the vessel. The transducer set was then attached and securely fastened to the skin with an adhesive dressing. Waveforms on the monitor were observed and found to be adequate. The patient had good distal perfusion after the procedure. The site was then dressed in a sterile fashion. The patient tolerated the procedure well.      Complications: bleeding

## 2020-09-23 NOTE — ANESTHESIA POSTPROCEDURE EVALUATION
Department of Anesthesiology  Postprocedure Note    Patient: Claudell Parr  MRN: [id number]  YOB: 1960  Date of evaluation: 9/23/2020  Time:  4:48 AM     Procedure Summary     Date:  09/23/20 Room / Location:  [address]    Anesthesia Start:  0283 Anesthesia Stop:  9452    Procedure:  LAPAROTOMY EXPLORATORY (N/A ) Diagnosis:  (PNEUMOPERITONEUM)    Surgeon:  Syeda Wilkins DO Responsible Provider:  Elijah Potter MD    Anesthesia Type:  general ASA Status:  3 - Emergent          Anesthesia Type: general    Destiny Phase I:      Destiny Phase II:      Last vitals: Reviewed and per EMR flowsheets.        Anesthesia Post Evaluation    Patient location during evaluation: PACU  Patient participation: complete - patient participated  Level of consciousness: awake and alert  Pain score: 3  Airway patency: patent  Nausea & Vomiting: no vomiting and no nausea  Complications: no  Cardiovascular status: hemodynamically stable  Respiratory status: acceptable  Hydration status: stable

## 2020-09-23 NOTE — PROGRESS NOTES
Dr Carson Bautista notified of heart rate/b/p/u/o and no stepdown beds available within the hour  -orders given

## 2020-09-23 NOTE — ED PROVIDER NOTES
[facility]  Emergency Department  Faculty Sign-Out Addendum     Care of Talon Mccauley II was assumed from previous attending and is being seen for Abdominal Pain and Nausea  . The patient's initial evaluation and plan have been discussed with the prior provider who initially evaluated the patient. Handoff taken on the following patient from prior Attending Physician:    Bernardo Thayer    I was available and discussed any additional care issues that arose and coordinated the management plans with the resident(s) caring for the patient during my duty period. Any areas of disagreement with residents documentation of care or procedures are noted on the chart. I was personally present for the key portions of any/all procedures during my duty period. I have documented in the chart those procedures where I was not present during the key portions.       EMERGENCY DEPARTMENT COURSE / MEDICAL DECISION MAKING:       MEDICATIONS GIVEN:  Orders Placed This Encounter   Medications    ondansetron (ZOFRAN) injection 4 mg    0.9 % sodium chloride bolus    morphine injection 4 mg    morphine injection 4 mg    lactated ringers bolus    HYDROmorphone (DILAUDID) injection 0.5 mg    cefepime (MAXIPIME) 1 g IVPB minibag    metronidazole (FLAGYL) 500 mg in NaCl 100 mL IVPB premix       LABS / RADIOLOGY:     Labs Reviewed   CBC WITH AUTO DIFFERENTIAL - Abnormal; Notable for the following components:       Result Value    WBC 13.6 (*)     Hemoglobin 12.2 (*)     Hematocrit 39.7 (*)     RDW 15.0 (*)     Platelets 610 (*)     Seg Neutrophils 85 (*)     Lymphocytes 7 (*)     Eosinophils % 0 (*)     Immature Granulocytes 1 (*)     Segs Absolute 11.48 (*)     Absolute Lymph # 0.94 (*)     All other components within normal limits   COMPREHENSIVE METABOLIC PANEL - Abnormal; Notable for the following components:    Glucose 201 (*)     CREATININE 1.63 (*)     Sodium 131 (*)     CO2 17 (*)     Total Protein 8.4

## 2020-09-23 NOTE — CONSULTS
General Surgery:  Consult Note        PATIENT NAME: Eva Woodward II   YOB: 1960    ADMISSION DATE: 9/22/2020  4:21 PM     Admitting Provider: Christine Ornelas Physician: Madelin Meraz DATE: 9/22/2020    Chief Complaint:  Abdominal pain  Consult Regarding:  Abdominal pain, tachycardia, emesis    HISTORY OF PRESENT ILLNESS:  The patient is a 56 y.o. male with history of hypertension, diabetes, gout, hepatocellular carcinoma s/p left nephrectomy, exploratory laparotomy, resection of descending colon with primary anastomosis, resection of portion of jejunum with primary anastomosis, G-tube placement on 9/2/20; presents to the ED with increasing abdominal pain starting yesterday. Notes that the pain started yesterday evening, pain seemed to start on his left flank and then slowly came across the center of his abdomen. Says that the pain was unbearable could not find a comfortable position in bed. This morning he was only able to tolerate minimal amount of p.o. intake today and had 2 episodes of emesis. Prior to this a.m. he has not had any problems with p.o. intake and has not had any nausea. States that he has had diarrhea yesterday and this morning and he continues to pass flatus. States he has been urinating adequately without any dysuria. Denies fever or chills. General surgery was consulted due to concern for increasing abdominal pain.        Past Medical History:        Diagnosis Date    Back pain     Cellulitis     Diabetes mellitus ([address]     Gout     History of kidney cancer 08/10/2020    Recent diagnosis    Hypertension        Past Surgical History:        Procedure Laterality Date    ABDOMINAL EXPLORATION SURGERY  09/02/2020    EXPLORATORY LAPAROTOMY BOWEL RESECTION     ANKLE SURGERY      CARPAL TUNNEL RELEASE Bilateral     CYSTOSCOPY Right 9/1/2020    CYSTOSCOPY RIGHT URETERAL STENT INSERTION performed by July Orozco MD at [address] TRIPLE  9/3/2020         KIDNEY SURGERY Left 9/2/2020    LAPAROSCOPIC XI ROBOTIC NEPHRECTOMY performed by Seth Encarnacion MD at [facility] Bilateral    [address] N/A 9/2/2020    EXPLORATORY LAPAROTOMY, RESECTION OF PROXIMAL JEJUNUM AND DESCENDING COLON WITH MOBILIZATION OF SPLENIC FLEXURE AND PRIMARY ANASTAMOSISOF SMALL BOWEL AND COLON, PLACEMENT OF GASTROSTOMY TUBE performed by Rodrigue Wing DO at [address] [facility] Left 09/02/2020    LAPAROSCOPIC XI ROBOTIC NEPHRECTOMY        Medications Prior to Admission:   Not in a hospital admission. Allergies:  Ampicillin; Pcn [penicillins]; and Tape [place] [facility] tape]    Social History:   Social History     Socioeconomic History    Marital status:      Spouse name: Not on file    Number of children: Not on file    Years of education: Not on file    Highest education level: Not on file   Occupational History    Not on file   Social Needs    Financial resource strain: Not on file    Food insecurity     Worry: Not on file     Inability: Not on file   [employer] needs     Medical: Not on file     Non-medical: Not on file   Tobacco Use    Smoking status: Never Smoker    Smokeless tobacco: Never Used   Substance and Sexual Activity    Alcohol use:  Yes     Alcohol/week: 0.0 standard drinks     Comment: once a week    Drug use: No    Sexual activity: Not on file   Lifestyle    Physical activity     Days per week: Not on file     Minutes per session: Not on file    Stress: Not on file   Relationships    Social connections     Talks on phone: Not on file     Gets together: Not on file     Attends [employer] service: Not on file     Active member of club or organization: Not on file     Attends meetings of clubs or organizations: Not on file     Relationship status: Not on file    Intimate partner violence     Fear of current or ex partner: Not on file     Emotionally abused: Not on file     Physically abused: Not on file     Forced sexual activity: Not on file   Other Topics Concern    Not on file   Social History Narrative    Not on file       Family History:       Problem Relation Age of Onset    Stroke Maternal Grandmother     Stroke Maternal Grandfather        REVIEW OF SYSTEMS:    CONSTITUTIONAL: Denies recent weight loss, fatigue, fevers, chills. HEENT: Denies rhinorrhea, dysphagia, odynphagia. CARDIOVASCULAR: Denies history of MI, recent chest pain. RESPIRATORY: Denies recent history of shortness of breath or history of PE. GASTROINTESTINAL: Having abdominal pain, had 2 episodes of emesis, decrease ability to tolerate p.o. intake  GENITOURINARY: Denies increased frequency or dysuria. HEMATOLOGIC/LYMPHATIC: Denies history of anemia or DVTs. ENDOCRINE: Has history of diabetes  NEURO: Denies history of CVA, TIA. Review of systems negative unless listed above. PHYSICAL EXAM:    VITALS:  BP (!) 131/102   Pulse 132   Temp 99.9 °F (37.7 °C) (Oral)   Resp 30   Ht 5' 11\" (1.803 m)   Wt 243 lb (110.2 kg)   SpO2 94%   BMI 33.89 kg/m²   INTAKE/OUTPUT:   No intake or output data in the 24 hours ending 09/22/20 2316    CONSTITUTIONAL:  awake, alert, mild distress, and moderately obese  HEENT: Normocephalic/atraumatic, without obvious abnormality. NECK:  Supple, symmetrical, trachea midline   CARDIOVASCULAR: Tachycardic with a regular rhythm  LUNGS: Equal chest rise bilaterally, good air movement, not in respiratory distress  ABDOMEN: Tender to palpation greatest in left lower quadrant although is also tender in the right lower quadrant, abdomen is mildly distended and tympanic, G-tube in place, securing G-tube in place seem to be slipping, midline incision healing well  MUSCULOSKELETAL: Muscle strength intact in all extremities bilaterally. NEUROLOGIC: CN II- XII intact. Gross motor intact without focal weakness. SKIN: No cyanosis, rashes, or edema noted.   Orientation:   oriented to person, place, and time      CBC with Differential:    Lab Results   Component Value Date    WBC 13.6 09/22/2020    RBC 4.51 09/22/2020    HGB 12.2 09/22/2020    HCT 39.7 09/22/2020     09/22/2020    MCV 88.0 09/22/2020    MCH 27.1 09/22/2020    MCHC 30.7 09/22/2020    RDW 15.0 09/22/2020    LYMPHOPCT 7 09/22/2020    MONOPCT 7 09/22/2020    BASOPCT 1 09/22/2020    MONOSABS 0.89 09/22/2020    LYMPHSABS 0.94 09/22/2020    EOSABS 0.06 09/22/2020    BASOSABS 0.09 09/22/2020    DIFFTYPE NOT REPORTED 09/22/2020     BMP:    Lab Results   Component Value Date     09/22/2020    K 5.1 09/22/2020     09/22/2020    CO2 17 09/22/2020    BUN 16 09/22/2020    LABALBU 3.1 09/22/2020    CREATININE 1.63 09/22/2020    CALCIUM 9.0 09/22/2020    GFRAA 52 09/22/2020    LABGLOM 43 09/22/2020    GLUCOSE 201 09/22/2020     Hepatic Function Panel:    Lab Results   Component Value Date    ALKPHOS 112 09/22/2020    ALT 21 09/22/2020    AST 32 09/22/2020    PROT 8.4 09/22/2020    BILITOT 0.47 09/22/2020    BILIDIR 0.24 09/02/2020    IBILI 0.19 09/02/2020    LABALBU 3.1 09/22/2020       Pertinent Radiology:   Ct Abdomen Pelvis Wo Contrast Additional Contrast? None    Result Date: 9/22/2020  EXAMINATION: CT OF THE ABDOMEN AND PELVIS WITHOUT CONTRAST 9/22/2020 7:39 pm TECHNIQUE: CT of the abdomen and pelvis was performed without the administration of intravenous contrast. Multiplanar reformatted images are provided for review. Dose modulation, iterative reconstruction, and/or weight based adjustment of the mA/kV was utilized to reduce the radiation dose to as low as reasonably achievable.  COMPARISON: 09/07/2020 HISTORY: ORDERING SYSTEM PROVIDED HISTORY: Recent nephrectomy and bowel anastomoses, left flank and abdominal pain, nausea vomiting TECHNOLOGIST PROVIDED HISTORY: Recent nephrectomy and bowel anastomoses, left flank and abdominal pain, nausea vomiting Reason for Exam: recent nephrectomy and bowel anastomoses, left flank and abdominal pain, intraperitoneal air seen on CT scan. CT scan was done without IV contrast due to concern for kidney injury this making it difficult to delineate any bowel pathology. Plan:  1. Continue medical mgmt and supportive care per primary  2. Diet: NPO  3. Plan to repeat CT abdomen pelvis with oral and rectal contrast, this will determine further recommendations  4. Discussed with patient possible need for surgical intervention given results of CT scans, patient understands and is willing to proceed with surgical intervention if needed  5. Patient will need IV antibiotics, Zosyn  6. Patient continue asked who have persistent tachycardia despite 2 L of bolus fluid  7.  Patient would likely benefit from a nephrology consult    Discussed with Dr. Beni Jaramillo      Electronically signed by Jennifer Lassiter DO  on 9/22/2020 at 11:16 PM

## 2020-09-23 NOTE — ANESTHESIA PRE PROCEDURE
Department of Anesthesiology  Preprocedure Note       Name:  Isreal Fofana II   Age:  56 y.o.  :  1960                                          MRN:  [id number]         Date:  2020      Surgeon: Eleno Pierce):  Purnima Carrillo DO    Procedure: Procedure(s):  LAPAROTOMY EXPLORATORY    Medications prior to admission:   Prior to Admission medications    Medication Sig Start Date End Date Taking? Authorizing Provider   sodium bicarbonate 650 MG tablet Take 1 tablet by mouth 2 times daily 20   Luis Goncalves MD   gabapentin (NEURONTIN) 300 MG capsule Take 1 capsule by mouth 3 times daily for 7 days. 20  Suleiman Lopez MD   insulin glargine (LANTUS SOLOSTAR) 100 UNIT/ML injection pen Inject 60 Units into the skin nightly 20   Suleiman Lopez MD   insulin lispro, 1 Unit Dial, (HUMALOG KWIKPEN) 100 UNIT/ML SOPN Inject 0-6 Units into the skin 3 times daily (before meals) **Corrective Low Dose Algorithm**  Glucose: Dose:               No Insulin  140-199 1 Unit  200-249 2 Units  250-299 3 Units  300-349 4 Units  350-399 5 Units  Over 399 6 Units 20   Suleiman Lopez MD   ferrous sulfate (IRON 325) 325 (65 Fe) MG tablet Take 1 tablet by mouth daily (with breakfast) 20   Suleiman Lopez MD   pantoprazole (PROTONIX) 40 MG tablet Take 1 tablet by mouth daily 20   Suleiman Lopez MD   blood glucose monitor strips Test 4 times a day & as needed for symptoms of irregular blood glucose. Dispense sufficient amount for indicated testing frequency plus additional to accommodate PRN testing needs.  20   Suleiman Lopez MD   Alcohol Swabs PADS 1 each by Does not apply route 4 times daily 20   Suleiman Lopez MD   Insulin Pen Needle ([employer] PEN NEEDLES) 31G X 6 MM MISC 1 each by Does not apply route daily 20   Suleiman Lopez MD   Lancets MISC 1 each by Does not apply route 3 times daily 20   Suleiman Lopez MD   glucose monitoring kit (FREESTYLE) monitoring kit 1 kit by Does not apply route daily Insulin  140-199 1 Unit  200-249 2 Units  250-299 3 Units  300-349 4 Units  350-399 5 Units  Over 399 6 Units 5 pen 0    ferrous sulfate (IRON 325) 325 (65 Fe) MG tablet Take 1 tablet by mouth daily (with breakfast) 60 tablet 0    pantoprazole (PROTONIX) 40 MG tablet Take 1 tablet by mouth daily 60 tablet 0    blood glucose monitor strips Test 4 times a day & as needed for symptoms of irregular blood glucose. Dispense sufficient amount for indicated testing frequency plus additional to accommodate PRN testing needs. 120 strip 0    Alcohol Swabs PADS 1 each by Does not apply route 4 times daily 120 each 0    Insulin Pen Needle ([employer] PEN NEEDLES) 31G X 6 MM MISC 1 each by Does not apply route daily 100 each 3    Lancets MISC 1 each by Does not apply route 3 times daily 200 each 0    glucose monitoring kit (FREESTYLE) monitoring kit 1 kit by Does not apply route daily 1 kit 0    traMADol (ULTRAM) 50 MG tablet Take 100 mg by mouth every 6 hours as needed for Pain.  Melatonin 10 MG TABS Take 10 mg by mouth nightly as needed (SLEEP)      vitamin C (ASCORBIC ACID) 500 MG tablet Take 500 mg by mouth daily      acetaminophen (TYLENOL) 500 MG tablet Take 2 tablets by mouth every 6 hours as needed for Pain 40 tablet 0    cyclobenzaprine (FLEXERIL) 10 MG tablet Take 1 tablet by mouth 3 times daily as needed for Muscle spasms (Patient taking differently: Take 10 mg by mouth 2 times daily as needed for Muscle spasms ) 30 tablet 0    aspirin 81 MG tablet Take 81 mg by mouth nightly       pravastatin (PRAVACHOL) 20 MG tablet Take 20 mg by mouth daily      metoprolol (LOPRESSOR) 50 MG tablet Take 50 mg by mouth 2 times daily      allopurinol (ZYLOPRIM) 100 MG tablet Take 100 mg by mouth daily      zolpidem (AMBIEN) 10 MG tablet Take by mouth nightly as needed for Sleep      hydrALAZINE (APRESOLINE) 25 MG tablet Take 25 mg by mouth 3 times daily          Allergies:     Allergies   Allergen Reactions    Ampicillin Swelling     Swelling of throat.  Pcn [Penicillins] Swelling     Throat swelling. Tolerated cefepime during 8/31/20 admission.  Tape [facility] Tape]        Problem List:    Patient Active Problem List   Diagnosis Code    Cellulitis L03.90    Type 2 diabetes mellitus without complication (HCC) K31.1    Essential hypertension I10    Morbid obesity due to excess calories ([facility]) E66.01    Chronic kidney disease (CKD) N18.9    DEBBI (acute kidney injury) ([address] N17.9    Bowel obstruction ([address] K56.609    Small bowel obstruction ([address] K56.609    Severe malnutrition ([address] E43    Hyperglycemia R73.9    Renal cell cancer ([address] C64.9       Past Medical History:        Diagnosis Date    Back pain     Cellulitis     Diabetes mellitus ([address]     Gout     History of kidney cancer 08/10/2020    Recent diagnosis    Hypertension        Past Surgical History:        Procedure Laterality Date    ABDOMINAL EXPLORATION SURGERY  09/02/2020    EXPLORATORY LAPAROTOMY BOWEL RESECTION     ANKLE SURGERY      CARPAL TUNNEL RELEASE Bilateral     CYSTOSCOPY Right 9/1/2020    CYSTOSCOPY RIGHT URETERAL STENT INSERTION performed by Diamond Banerjee MD at [address] CATH POWER PICC TRIPLE  9/3/2020         KIDNEY SURGERY Left 9/2/2020    LAPAROSCOPIC XI ROBOTIC NEPHRECTOMY performed by Charmaine Koch MD at [facility] Bilateral    [address] N/A 9/2/2020    EXPLORATORY LAPAROTOMY, RESECTION OF PROXIMAL JEJUNUM AND DESCENDING COLON WITH MOBILIZATION OF SPLENIC FLEXURE AND PRIMARY ANASTAMOSISOF SMALL BOWEL AND COLON, PLACEMENT OF GASTROSTOMY TUBE performed by Bhupendra Rodrigues DO at [address] Left 09/02/2020    LAPAROSCOPIC XI ROBOTIC NEPHRECTOMY        Social History:    Social History     Tobacco Use    Smoking status: Never Smoker    Smokeless tobacco: Never Used   Substance Use Topics    Alcohol use:  Yes     Alcohol/week: 0.0 standard drinks     Comment: once Detected 09/22/2020    COVID19 Not Detected 06/09/2020         Anesthesia Evaluation  Patient summary reviewed no history of anesthetic complications:   Airway: Mallampati: II  TM distance: >3 FB   Neck ROM: full  Mouth opening: > = 3 FB Dental:          Pulmonary:Negative Pulmonary ROS and normal exam                               Cardiovascular:    (+) hypertension: no interval change,       ECG reviewed  Rhythm: regular  Rate: normal                    Neuro/Psych:   Negative Neuro/Psych ROS              GI/Hepatic/Renal: Neg GI/Hepatic/Renal ROS            Endo/Other:    (+) DiabetesType II DM, no interval change, using insulin, . Abdominal:   (+) obese,         Vascular: negative vascular ROS. Anesthesia Plan      general     ASA 3 - emergent       Induction: intravenous and rapid sequence. Anesthetic plan and risks discussed with patient. Plan discussed with CRNA.                   Tonya Morillo MD   9/23/2020

## 2020-09-23 NOTE — ED NOTES
Oral/NG contrast into NGT per resident and student. OK to scan at 0013.      Filomena Palumbo RN  09/22/20 5718

## 2020-09-23 NOTE — PROGRESS NOTES
Dr Faiza morris served with u/o/heart rate in 120's /bp and pain improved -orders given may send to stepdown

## 2020-09-23 NOTE — PROGRESS NOTES
Dr Chadwick Krueger notified of H&H and heart rate and 12 lead ekg reading and pain level -orders given

## 2020-09-24 LAB
ABSOLUTE EOS #: 0.07 K/UL (ref 0–0.44)
ABSOLUTE IMMATURE GRANULOCYTE: 0.07 K/UL (ref 0–0.3)
ABSOLUTE LYMPH #: 0.83 K/UL (ref 1.1–3.7)
ABSOLUTE MONO #: 1.19 K/UL (ref 0.1–1.2)
ANION GAP SERPL CALCULATED.3IONS-SCNC: 10 MMOL/L (ref 9–17)
BASOPHILS # BLD: 0 % (ref 0–2)
BASOPHILS ABSOLUTE: 0.04 K/UL (ref 0–0.2)
BUN BLDV-MCNC: 16 MG/DL (ref 8–23)
BUN/CREAT BLD: ABNORMAL (ref 9–20)
CALCIUM IONIZED: 1.12 MMOL/L (ref 1.13–1.33)
CALCIUM SERPL-MCNC: 8 MG/DL (ref 8.6–10.4)
CHLORIDE BLD-SCNC: 108 MMOL/L (ref 98–107)
CO2: 19 MMOL/L (ref 20–31)
CREAT SERPL-MCNC: 1.37 MG/DL (ref 0.7–1.2)
DIFFERENTIAL TYPE: ABNORMAL
EKG ATRIAL RATE: 131 BPM
EKG ATRIAL RATE: 134 BPM
EKG P AXIS: 28 DEGREES
EKG P AXIS: 35 DEGREES
EKG P-R INTERVAL: 152 MS
EKG P-R INTERVAL: 152 MS
EKG Q-T INTERVAL: 286 MS
EKG Q-T INTERVAL: 292 MS
EKG QRS DURATION: 80 MS
EKG QRS DURATION: 80 MS
EKG QTC CALCULATION (BAZETT): 422 MS
EKG QTC CALCULATION (BAZETT): 436 MS
EKG R AXIS: -2 DEGREES
EKG R AXIS: -5 DEGREES
EKG T AXIS: 15 DEGREES
EKG T AXIS: 9 DEGREES
EKG VENTRICULAR RATE: 131 BPM
EKG VENTRICULAR RATE: 134 BPM
EOSINOPHILS RELATIVE PERCENT: 1 % (ref 1–4)
GFR AFRICAN AMERICAN: >60 ML/MIN
GFR NON-AFRICAN AMERICAN: 53 ML/MIN
GFR SERPL CREATININE-BSD FRML MDRD: ABNORMAL ML/MIN/{1.73_M2}
GFR SERPL CREATININE-BSD FRML MDRD: ABNORMAL ML/MIN/{1.73_M2}
GLUCOSE BLD-MCNC: 125 MG/DL (ref 75–110)
GLUCOSE BLD-MCNC: 151 MG/DL (ref 75–110)
GLUCOSE BLD-MCNC: 154 MG/DL (ref 70–99)
GLUCOSE BLD-MCNC: 158 MG/DL (ref 75–110)
GLUCOSE BLD-MCNC: 174 MG/DL (ref 75–110)
HCT VFR BLD CALC: 25.5 % (ref 40.7–50.3)
HEMOGLOBIN: 7.9 G/DL (ref 13–17)
IMMATURE GRANULOCYTES: 1 %
LYMPHOCYTES # BLD: 7 % (ref 24–43)
MAGNESIUM: 1.3 MG/DL (ref 1.6–2.6)
MCH RBC QN AUTO: 27.3 PG (ref 25.2–33.5)
MCHC RBC AUTO-ENTMCNC: 31 G/DL (ref 28.4–34.8)
MCV RBC AUTO: 88.2 FL (ref 82.6–102.9)
MONOCYTES # BLD: 10 % (ref 3–12)
NRBC AUTOMATED: 0 PER 100 WBC
PDW BLD-RTO: 15.4 % (ref 11.8–14.4)
PHOSPHORUS: 2.7 MG/DL (ref 2.5–4.5)
PLATELET # BLD: 268 K/UL (ref 138–453)
PLATELET ESTIMATE: ABNORMAL
PMV BLD AUTO: 10.1 FL (ref 8.1–13.5)
POTASSIUM SERPL-SCNC: 4.9 MMOL/L (ref 3.7–5.3)
RBC # BLD: 2.89 M/UL (ref 4.21–5.77)
RBC # BLD: ABNORMAL 10*6/UL
SEG NEUTROPHILS: 82 % (ref 36–65)
SEGMENTED NEUTROPHILS ABSOLUTE COUNT: 10.26 K/UL (ref 1.5–8.1)
SODIUM BLD-SCNC: 137 MMOL/L (ref 135–144)
TROPONIN INTERP: ABNORMAL
TROPONIN T: ABNORMAL NG/ML
TROPONIN, HIGH SENSITIVITY: 24 NG/L (ref 0–22)
TROPONIN, HIGH SENSITIVITY: 28 NG/L (ref 0–22)
TROPONIN, HIGH SENSITIVITY: 30 NG/L (ref 0–22)
WBC # BLD: 12.5 K/UL (ref 3.5–11.3)
WBC # BLD: ABNORMAL 10*3/UL

## 2020-09-24 PROCEDURE — 84484 ASSAY OF TROPONIN QUANT: CPT

## 2020-09-24 PROCEDURE — 93005 ELECTROCARDIOGRAM TRACING: CPT | Performed by: STUDENT IN AN ORGANIZED HEALTH CARE EDUCATION/TRAINING PROGRAM

## 2020-09-24 PROCEDURE — 2580000003 HC RX 258: Performed by: STUDENT IN AN ORGANIZED HEALTH CARE EDUCATION/TRAINING PROGRAM

## 2020-09-24 PROCEDURE — 2580000003 HC RX 258: Performed by: NURSE PRACTITIONER

## 2020-09-24 PROCEDURE — P9047 ALBUMIN (HUMAN), 25%, 50ML: HCPCS | Performed by: STUDENT IN AN ORGANIZED HEALTH CARE EDUCATION/TRAINING PROGRAM

## 2020-09-24 PROCEDURE — 2500000003 HC RX 250 WO HCPCS: Performed by: NURSE PRACTITIONER

## 2020-09-24 PROCEDURE — 2500000003 HC RX 250 WO HCPCS: Performed by: STUDENT IN AN ORGANIZED HEALTH CARE EDUCATION/TRAINING PROGRAM

## 2020-09-24 PROCEDURE — 80048 BASIC METABOLIC PNL TOTAL CA: CPT

## 2020-09-24 PROCEDURE — 93010 ELECTROCARDIOGRAM REPORT: CPT | Performed by: INTERNAL MEDICINE

## 2020-09-24 PROCEDURE — 84100 ASSAY OF PHOSPHORUS: CPT

## 2020-09-24 PROCEDURE — 6360000002 HC RX W HCPCS: Performed by: STUDENT IN AN ORGANIZED HEALTH CARE EDUCATION/TRAINING PROGRAM

## 2020-09-24 PROCEDURE — 99213 OFFICE O/P EST LOW 20 MIN: CPT

## 2020-09-24 PROCEDURE — 82330 ASSAY OF CALCIUM: CPT

## 2020-09-24 PROCEDURE — 6370000000 HC RX 637 (ALT 250 FOR IP): Performed by: STUDENT IN AN ORGANIZED HEALTH CARE EDUCATION/TRAINING PROGRAM

## 2020-09-24 PROCEDURE — 2500000003 HC RX 250 WO HCPCS: Performed by: SURGERY

## 2020-09-24 PROCEDURE — 83735 ASSAY OF MAGNESIUM: CPT

## 2020-09-24 PROCEDURE — 85025 COMPLETE CBC W/AUTO DIFF WBC: CPT

## 2020-09-24 PROCEDURE — 6370000000 HC RX 637 (ALT 250 FOR IP): Performed by: NURSE PRACTITIONER

## 2020-09-24 PROCEDURE — 94770 HC ETCO2 MONITOR DAILY: CPT

## 2020-09-24 PROCEDURE — 6360000002 HC RX W HCPCS: Performed by: NURSE PRACTITIONER

## 2020-09-24 PROCEDURE — 82947 ASSAY GLUCOSE BLOOD QUANT: CPT

## 2020-09-24 PROCEDURE — 2500000003 HC RX 250 WO HCPCS

## 2020-09-24 PROCEDURE — 2000000000 HC ICU R&B

## 2020-09-24 RX ORDER — DEXTROSE AND SODIUM CHLORIDE 5; .45 G/100ML; G/100ML
INJECTION, SOLUTION INTRAVENOUS CONTINUOUS
Status: DISCONTINUED | OUTPATIENT
Start: 2020-09-24 | End: 2020-10-01

## 2020-09-24 RX ORDER — PANTOPRAZOLE SODIUM 40 MG/1
40 TABLET, DELAYED RELEASE ORAL
Status: DISCONTINUED | OUTPATIENT
Start: 2020-09-25 | End: 2020-10-02 | Stop reason: HOSPADM

## 2020-09-24 RX ORDER — BISACODYL 10 MG
10 SUPPOSITORY, RECTAL RECTAL DAILY
Status: DISPENSED | OUTPATIENT
Start: 2020-09-24 | End: 2020-09-28

## 2020-09-24 RX ORDER — CIPROFLOXACIN 2 MG/ML
400 INJECTION, SOLUTION INTRAVENOUS EVERY 12 HOURS
Status: COMPLETED | OUTPATIENT
Start: 2020-09-24 | End: 2020-09-27

## 2020-09-24 RX ORDER — OXYCODONE HYDROCHLORIDE 5 MG/1
10 TABLET ORAL EVERY 4 HOURS PRN
Status: DISCONTINUED | OUTPATIENT
Start: 2020-09-24 | End: 2020-10-02 | Stop reason: HOSPADM

## 2020-09-24 RX ORDER — METOPROLOL TARTRATE 50 MG/1
50 TABLET, FILM COATED ORAL 2 TIMES DAILY
Status: DISCONTINUED | OUTPATIENT
Start: 2020-09-24 | End: 2020-09-25

## 2020-09-24 RX ORDER — MAGNESIUM SULFATE 1 G/100ML
1 INJECTION INTRAVENOUS
Status: COMPLETED | OUTPATIENT
Start: 2020-09-24 | End: 2020-09-24

## 2020-09-24 RX ORDER — OXYCODONE HYDROCHLORIDE 5 MG/1
5 TABLET ORAL EVERY 4 HOURS PRN
Status: DISCONTINUED | OUTPATIENT
Start: 2020-09-24 | End: 2020-09-24

## 2020-09-24 RX ORDER — METHOCARBAMOL 750 MG/1
750 TABLET, FILM COATED ORAL EVERY 6 HOURS
Status: DISCONTINUED | OUTPATIENT
Start: 2020-09-24 | End: 2020-10-02 | Stop reason: HOSPADM

## 2020-09-24 RX ADMIN — ACETAMINOPHEN 1000 MG: 500 TABLET ORAL at 14:11

## 2020-09-24 RX ADMIN — HYDROMORPHONE HYDROCHLORIDE 0.5 MG: 1 INJECTION, SOLUTION INTRAMUSCULAR; INTRAVENOUS; SUBCUTANEOUS at 04:49

## 2020-09-24 RX ADMIN — SODIUM CHLORIDE: 9 INJECTION, SOLUTION INTRAVENOUS at 08:54

## 2020-09-24 RX ADMIN — OXYCODONE HYDROCHLORIDE 5 MG: 5 TABLET ORAL at 08:57

## 2020-09-24 RX ADMIN — METHOCARBAMOL TABLETS 750 MG: 750 TABLET, COATED ORAL at 19:37

## 2020-09-24 RX ADMIN — METRONIDAZOLE 500 MG: 500 INJECTION, SOLUTION INTRAVENOUS at 02:51

## 2020-09-24 RX ADMIN — INSULIN LISPRO 3 UNITS: 100 INJECTION, SOLUTION INTRAVENOUS; SUBCUTANEOUS at 14:09

## 2020-09-24 RX ADMIN — METHOCARBAMOL TABLETS 750 MG: 750 TABLET, COATED ORAL at 12:13

## 2020-09-24 RX ADMIN — MAGNESIUM SULFATE HEPTAHYDRATE 1 G: 1 INJECTION, SOLUTION INTRAVENOUS at 12:24

## 2020-09-24 RX ADMIN — METHOCARBAMOL TABLETS 750 MG: 750 TABLET, COATED ORAL at 23:56

## 2020-09-24 RX ADMIN — METRONIDAZOLE 500 MG: 500 INJECTION, SOLUTION INTRAVENOUS at 19:37

## 2020-09-24 RX ADMIN — METOPROLOL TARTRATE 5 MG: 1 INJECTION, SOLUTION INTRAVENOUS at 01:22

## 2020-09-24 RX ADMIN — CIPROFLOXACIN 400 MG: 2 INJECTION, SOLUTION INTRAVENOUS at 19:37

## 2020-09-24 RX ADMIN — OXYCODONE HYDROCHLORIDE 10 MG: 5 TABLET ORAL at 23:58

## 2020-09-24 RX ADMIN — Medication 35 MG: at 10:19

## 2020-09-24 RX ADMIN — OXYCODONE HYDROCHLORIDE 10 MG: 5 TABLET ORAL at 12:21

## 2020-09-24 RX ADMIN — MAGNESIUM SULFATE HEPTAHYDRATE 1 G: 1 INJECTION, SOLUTION INTRAVENOUS at 13:36

## 2020-09-24 RX ADMIN — OXYCODONE HYDROCHLORIDE 10 MG: 5 TABLET ORAL at 19:37

## 2020-09-24 RX ADMIN — METOPROLOL TARTRATE 5 MG: 1 INJECTION, SOLUTION INTRAVENOUS at 04:49

## 2020-09-24 RX ADMIN — HEPARIN SODIUM 5000 UNITS: 5000 INJECTION INTRAVENOUS; SUBCUTANEOUS at 14:11

## 2020-09-24 RX ADMIN — HEPARIN SODIUM 5000 UNITS: 5000 INJECTION INTRAVENOUS; SUBCUTANEOUS at 06:44

## 2020-09-24 RX ADMIN — ALBUMIN (HUMAN) 50 G: 0.25 INJECTION, SOLUTION INTRAVENOUS at 08:55

## 2020-09-24 RX ADMIN — HEPARIN SODIUM 5000 UNITS: 5000 INJECTION INTRAVENOUS; SUBCUTANEOUS at 20:05

## 2020-09-24 RX ADMIN — MAGNESIUM SULFATE HEPTAHYDRATE 1 G: 1 INJECTION, SOLUTION INTRAVENOUS at 10:18

## 2020-09-24 RX ADMIN — METRONIDAZOLE 500 MG: 500 INJECTION, SOLUTION INTRAVENOUS at 11:33

## 2020-09-24 RX ADMIN — DEXTROSE AND SODIUM CHLORIDE: 5; 450 INJECTION, SOLUTION INTRAVENOUS at 10:29

## 2020-09-24 RX ADMIN — CIPROFLOXACIN 400 MG: 2 INJECTION, SOLUTION INTRAVENOUS at 08:45

## 2020-09-24 RX ADMIN — FAMOTIDINE 20 MG: 10 INJECTION INTRAVENOUS at 08:56

## 2020-09-24 RX ADMIN — METOPROLOL TARTRATE 5 MG: 1 INJECTION, SOLUTION INTRAVENOUS at 08:55

## 2020-09-24 RX ADMIN — SODIUM CHLORIDE, PRESERVATIVE FREE 10 ML: 5 INJECTION INTRAVENOUS at 08:56

## 2020-09-24 RX ADMIN — METOPROLOL TARTRATE 50 MG: 50 TABLET, FILM COATED ORAL at 12:11

## 2020-09-24 RX ADMIN — MAGNESIUM SULFATE HEPTAHYDRATE 1 G: 1 INJECTION, SOLUTION INTRAVENOUS at 11:33

## 2020-09-24 RX ADMIN — INSULIN LISPRO 3 UNITS: 100 INJECTION, SOLUTION INTRAVENOUS; SUBCUTANEOUS at 20:05

## 2020-09-24 RX ADMIN — ACETAMINOPHEN 1000 MG: 500 TABLET ORAL at 20:05

## 2020-09-24 RX ADMIN — METHOCARBAMOL TABLETS 750 MG: 750 TABLET, COATED ORAL at 08:55

## 2020-09-24 RX ADMIN — ALBUMIN (HUMAN) 50 G: 0.25 INJECTION, SOLUTION INTRAVENOUS at 02:45

## 2020-09-24 RX ADMIN — METOPROLOL TARTRATE 50 MG: 50 TABLET, FILM COATED ORAL at 19:37

## 2020-09-24 ASSESSMENT — PAIN DESCRIPTION - DESCRIPTORS
DESCRIPTORS: ACHING;CONSTANT
DESCRIPTORS: ACHING;CONSTANT;DISCOMFORT

## 2020-09-24 ASSESSMENT — PAIN DESCRIPTION - ORIENTATION
ORIENTATION: MID

## 2020-09-24 ASSESSMENT — PAIN SCALES - GENERAL
PAINLEVEL_OUTOF10: 7
PAINLEVEL_OUTOF10: 8
PAINLEVEL_OUTOF10: 5
PAINLEVEL_OUTOF10: 7
PAINLEVEL_OUTOF10: 6
PAINLEVEL_OUTOF10: 8
PAINLEVEL_OUTOF10: 9
PAINLEVEL_OUTOF10: 7
PAINLEVEL_OUTOF10: 8
PAINLEVEL_OUTOF10: 7
PAINLEVEL_OUTOF10: 6

## 2020-09-24 ASSESSMENT — PAIN DESCRIPTION - ONSET
ONSET: ON-GOING

## 2020-09-24 ASSESSMENT — PAIN DESCRIPTION - LOCATION
LOCATION: ABDOMEN

## 2020-09-24 ASSESSMENT — PAIN DESCRIPTION - FREQUENCY
FREQUENCY: CONTINUOUS

## 2020-09-24 ASSESSMENT — PAIN DESCRIPTION - PAIN TYPE
TYPE: SURGICAL PAIN

## 2020-09-24 ASSESSMENT — PAIN - FUNCTIONAL ASSESSMENT

## 2020-09-24 ASSESSMENT — PAIN DESCRIPTION - PROGRESSION
CLINICAL_PROGRESSION: NOT CHANGED

## 2020-09-24 NOTE — CARE COORDINATION
Case Management Initial Discharge Plan  Dutch Cadena II,             Met with:patient to discuss discharge plans. Information verified: address, contacts, phone number, , insurance Yes    Emergency Contact/Next of Kin name & number:   Tremaine Witt (Wife) [phone]    PCP: Amanda Monsivais MD  Date of last visit: past year    Insurance Provider: [facility] [place]    Discharge Planning    Living Arrangements:      Support Systems:       Home has 1 stories  2 stairs to climb to get into front door,   Location of bedroom/bathroom in home main     Patient able to perform ADL's:Independent    Current Services (outpatient & in home)   DME equipment: Glucometer   DME provider: [facility]    Receiving oral anticoagulation therapy? No    If indicated:   Physician managing anticoagulation treatment: N/A  Where does patient obtain lab work for ATC treatment? n/a      Potential Assistance Needed:       Patient agreeable to home care: Yes  Freedom of choice provided:  yes    Prior SNF/Rehab Placement and Facility: n/a  Agreeable to SNF/Rehab: No  [place] of choice provided: n/a     Evaluation: n/a    Expected Discharge date:       Patient expects to be discharged to: Follow Up Appointment: Best Day/ Time:      Transportation provider: wife  Transportation arrangements needed for discharge: Yes      Readmission Risk              Risk of Unplanned Readmission:        32             Does patient have a readmission risk score greater than 14?: Yes  If yes, follow-up appointment must be made within 7 days of discharge. Goals of Care:       Discharge Plan: Home with wife; patient current and wants to resume [facility].  Per previous notes, patient was on TPN through [facility]. Referral sent to [address] [place] [address]          Electronically signed by Mary Carmen Garcia RN on 20 at 8:37 AM EDT

## 2020-09-24 NOTE — PROGRESS NOTES
Physician Progress Note      PATIENT:               Cara Fallon  CSN #:                  [id number]  :                       1960  ADMIT DATE:       2020 4:21 PM  [facility] DATE:  RESPONDING  PROVIDER #:        Francis COELHO DO          QUERY TEXT:    Pt admitted with hemoperitoneum. Pt noted to have drop in Hgb. If possible,   please document in the progress notes and discharge summary if you are   evaluating and/or treating any of the following: The medical record reflects the following:  Risk Factors: s/p exploratory laparotomy, transverse loop colostomy  Clinical Indicators: Hgb  12.2, post-op Hgb  7.9, pending op note for   EBL during surgery  Treatment: IV fluids at 100/hr, labs, monitoring    Call if any questions. Thank you, Taylor Goodman, [facility] [phone]  Options provided:  -- Postoperative acute blood loss anemia  -- Acute blood loss anemia  -- Dilutional anemia  -- Other - I will add my own diagnosis  -- Disagree - Not applicable / Not valid  -- Disagree - Clinically unable to determine / Unknown  -- Refer to Clinical Documentation Reviewer    PROVIDER RESPONSE TEXT:    This patient?s anemia is due to dilution.     Query created by: Carlyn Rowley on 2020 7:48 AM      Electronically signed by:  Briseyda Langston DO 2020 7:51 AM

## 2020-09-24 NOTE — PROGRESS NOTES
General Surgery:  Daily Progress Note          POD # 1 s/p exploratory laparotomy, transverse loop colostomy and LIOR drain placement          PATIENT NAME: Meaghan Ames II     TODAY'S DATE: 9/24/2020, 5:29 AM  CC: Abdominal pain    SUBJECTIVE:     Pt seen and examined at bedside. No overnight events, patient continued to be tachycardia throughout the night and pain management continued to be challenging. Tmax 99.6. Pt states overall pain is still significant, on MMT through IV since n.p.o., got PCA yesterday but reports little relief with that. Denies fever, chills, nausea, vomiting, chest pain, and SOB. Good UO P at 0.5 mL/kg/hr. Bowel sweat out through his ostomy. 10 mL in LIOR. Activity: As tolerated      OBJECTIVE:   VITALS:  BP (!) 162/75   Pulse 141   Temp 99.5 °F (37.5 °C) (Oral)   Resp 20   Ht 5' 11\" (1.803 m)   Wt 263 lb 14.3 oz (119.7 kg)   SpO2 96%   BMI 36.81 kg/m²      INTAKE/OUTPUT:      Intake/Output Summary (Last 24 hours) at 9/24/2020 0529  Last data filed at 9/24/2020 0258  Gross per 24 hour   Intake 5321.7 ml   Output 1862 ml   Net 3459.7 ml       PHYSICAL EXAM:  General Appearance: Awake, alert, mild distress due to pain  HEENT:  Normocephalic, atraumatic, mucus membranes moist   Heart: Tachycardia, well-perfused  Lungs: Unlabored breathing  Abdomen: Obese, soft, moderate distention, diffuse TTP throughout. LIOR drain right lower quadrant with minimal serosanguineous fluid. Gastrostomy tube in place infra umbilicus with bilious drainage. Incision:   Midline surgical incision covered by c/d/i dressings without neuro. covered with abdominal binder  Extremities: No cyanosis, pitting edema, rashes noted. Skin: Skin color, texture, turgor normal. No rashes or lesions.     IV Access: PIV   [place]: Yes    Data:  CBC with Differential:    Lab Results   Component Value Date    WBC 12.5 09/24/2020    RBC 2.89 09/24/2020    HGB 7.9 09/24/2020    HCT 25.5 09/24/2020     09/24/2020    MCV 88.2 09/24/2020    MCH 27.3 09/24/2020    MCHC 31.0 09/24/2020    RDW 15.4 09/24/2020    LYMPHOPCT 7 09/24/2020    MONOPCT 10 09/24/2020    BASOPCT 0 09/24/2020    MONOSABS 1.19 09/24/2020    LYMPHSABS 0.83 09/24/2020    EOSABS 0.07 09/24/2020    BASOSABS 0.04 09/24/2020    DIFFTYPE NOT REPORTED 09/24/2020     BMP:    Lab Results   Component Value Date     09/23/2020    K 5.2 09/23/2020     09/23/2020    CO2 19 09/23/2020    BUN 16 09/23/2020    LABALBU 3.1 09/22/2020    CREATININE 1.60 09/23/2020    CALCIUM 7.7 09/23/2020    GFRAA 53 09/23/2020    LABGLOM 44 09/23/2020    GLUCOSE 179 09/23/2020       Radiology Review:    Ct Abdomen Pelvis Wo Contrast Additional Contrast? Oral And Rectal (water Soluble Contrast Through G Tube And Rectal)    Result Date: 9/23/2020  1. Leakage of contrast at the colocolonic anastomosis with a large adjacent collection of fluid and gas. 2. Reactive infiltration of the omentum and left retroperitoneum. Ct Abdomen Pelvis Wo Contrast Additional Contrast? None    Result Date: 9/22/2020  Increasing free intraperitoneal air and fluid as compared to previous study of 09/07/2020. No obvious bowel obstruction or inflammation identified. Correlate for interval surgical instrumentation to the abdomen. Multiple additional chronic findings as described above. Xr Chest Portable    Result Date: 9/23/2020  Hypoinflated lungs. Left basilar atelectasis or infiltrate. Pulmonary vascular congestion versus vascular crowding. ASSESSMENT:      56 y.o. male is POD# 1 s/p exploratory laparotomy, transverse loop colostomy and LIOR drain placement      Plan:  1. Diet: NPO  2. Analgesia: MMT, further optimization needed  3. GI: Gastrostomy tube to gravity, monitor ostomy output  4. Tachycardia: Sinus tachycardia, on Lopressor 5 mg Q4H with HR in 120s to 130s. Recommend increase to 10 mg  5. DEBBI: Creatinine downtrending 1.37 from 1.60. UOP proved to 0.5 mL/kg/hr. Continue to monitor  6. Activity:  Continue to encourage ambulation/activity w/ PT/OT  7. Continue to encourage incentive spirometry  8. Wound care: will change dressing POD #2 (9/25)  9. Continue medical management per primary  10.      Electronically signed by Jacky Lott DO  on 9/24/2020 at 5:29 AM

## 2020-09-24 NOTE — PROGRESS NOTES
and continues to be tachycardic - give additional lopressor overnight and dilaudid for pain control.  UO adequate      OBJECTIVE  VITALS: Temp: Temp: 99.8 °F (37.7 °C)Temp  Av.9 °F (37.2 °C)  Min: 97.9 °F (36.6 °C)  Max: 99.8 °F (93.2 °C) BP Systolic (03NVT), LFL:549 , Min:102 , HQI:266   Diastolic (64QCP), RADHA:97, Min:73, Max:89   Pulse Pulse  Av.3  Min: 124  Max: 154 Resp Resp  Av.3  Min: 9  Max: 29 Pulse ox SpO2  Av.2 %  Min: 93 %  Max: 97 %    GENERAL: alert, no distress  NEURO: CNII-XII grossly intact; moving extremities equally  HEENT: EOMI  LUNGS: symmetric rise and fall of chest wall; normal effort  HEART: regular rhythm and tachycardic  ABDOMEN: softly distended, tender to palpation diffusely; LIOR drain with SS fluid, ostomy pink and healthy  EXTREMITY: no cyanosis, clubbing or edema    Drain/tube output:  20cc SS from LIOR drain    LAB:  CBC:   Recent Labs     20  0503 20  14320  0503   WBC 20.0* 21.0* 12.5*   HGB 11.3* 12.0* 7.9*   HCT 36.7* 39.2* 25.5*   MCV 87.8 89.7 88.2    385 268     BMP:   Recent Labs     20  14320  0503    133* 137   K 5.7* 5.2 4.9    107 108*   CO2 20 19* 19*   BUN 16 16 16   CREATININE 1.57* 1.60* 1.37*   GLUCOSE 189* 179* 154*         RADIOLOGY:  No new imaging to review    Spencer Lisa DO  Trauma Resident  2020 10:12 AM

## 2020-09-24 NOTE — PROGRESS NOTES
Progress Note    9/24/2020   11:58 AM    Name:  Silvano Javier  MRN:    [id number]     Acct:     [de-identified]   Room:  [id number]  IP Day:   Progress Note    2     Admit Date: 9/22/2020  4:21 PM  PCP: Hina Pineda MD    Subjective:     C/C:   Chief Complaint   Patient presents with    Abdominal Pain    Nausea       Interval History: Status: improved. Pt examined chart reviewed as above  This unfortunate gentleman was admitted with a free air in the abdomen and taken to surgery and has had a colostomy placed. He is now the second day postop improving still has some pain but overall improving. Urine output is good  He had an elevated potassium and nephrology consult. His creatinine is dropping at this point   Ros  General no weight loss or fever  ent no trouble hearing tasting talking or swallowing  Cardiac no chest pain or dyspnea  Respiratory no cough or dyspnea  Gi see history of present illness  gu history of present illness  Ortho no complaints of synovitis or decreased range of motion  Neuro no complaints of gait station or speech  Psych no complaints or nerves or memory  Vascular no claudication or stroke  Endo type 2 diabetes in reasonable control  Heme no complaints of anemia or blood dyscrasias      Medications: Allergies: Allergies   Allergen Reactions    Ampicillin Swelling     Swelling of throat.  Pcn [Penicillins] Swelling     Throat swelling. Tolerated cefepime during 8/31/20 admission.     Tape Beth Schilder Tape]        Current Meds: methocarbamol (ROBAXIN) tablet 750 mg, Q6H  bisacodyl (DULCOLAX) suppository 10 mg, Daily  magnesium sulfate 1 g in dextrose 5% 100 mL IVPB, Q1H  insulin lispro (HUMALOG) injection vial 0-18 Units, Q6H  oxyCODONE (ROXICODONE) immediate release tablet 10 mg, Q4H PRN  metoprolol tartrate (LOPRESSOR) tablet 50 mg, BID  dextrose 5 % and 0.45 % sodium chloride infusion, Continuous  ciprofloxacin (CIPRO) IVPB 400 mg, Q12H  metronidazole (FLAGYL) 500 mg in NaCl 100 mL IVPB premix, Q8H  [START ON 9/25/2020] pantoprazole (PROTONIX) tablet 40 mg, QAM AC  sodium chloride flush 0.9 % injection 10 mL, 2 times per day  sodium chloride flush 0.9 % injection 10 mL, PRN  acetaminophen (TYLENOL) tablet 1,000 mg, 3 times per day  [Held by provider] pravastatin (PRAVACHOL) tablet 20 mg, Daily  naloxone (NARCAN) injection 0.4 mg, PRN  fentaNYL 20 mcg/mL PCA, Continuous  lidocaine 4 % external patch 2 patch, Daily  glucose (GLUTOSE) 40 % oral gel 15 g, PRN  dextrose 50 % IV solution, PRN  glucagon (rDNA) injection 1 mg, PRN  dextrose 5 % solution, PRN  heparin (porcine) injection 5,000 Units, 3 times per day        Data:     Code Status:  Full Code    Family History   Problem Relation Age of Onset    Stroke Maternal Grandmother     Stroke Maternal Grandfather        Social History     Socioeconomic History    Marital status:      Spouse name: Not on file    Number of children: Not on file    Years of education: Not on file    Highest education level: Not on file   Occupational History    Not on file   Social Needs    Financial resource strain: Not on file    Food insecurity     Worry: Not on file     Inability: Not on file    Transportation needs     Medical: Not on file     Non-medical: Not on file   Tobacco Use    Smoking status: Never Smoker    Smokeless tobacco: Never Used   Substance and Sexual Activity    Alcohol use:  Yes     Alcohol/week: 0.0 standard drinks     Comment: once a week    Drug use: No    Sexual activity: Not on file   Lifestyle    Physical activity     Days per week: Not on file     Minutes per session: Not on file    Stress: Not on file   Relationships    Social connections     Talks on phone: Not on file     Gets together: Not on file     Attends Protestant service: Not on file     Active member of club or organization: Not on file     Attends meetings of clubs or organizations: Not on file     Relationship status: Not on file   Lj Archule[place] Intimate partner violence     Fear of current or ex partner: Not on file     Emotionally abused: Not on file     Physically abused: Not on file     Forced sexual activity: Not on file   Other Topics Concern    Not on file   Social History Narrative    Not on file       Vitals:  BP (!) 162/75   Pulse 133   Temp 99.8 °F (37.7 °C) (Oral)   Resp 20   Ht 5' 11\" (1.803 m)   Wt 269 lb 10 oz (122.3 kg)   SpO2 95%   BMI 37.60 kg/m²   Temp (24hrs), Av.9 °F (37.2 °C), Min:97.9 °F (36.6 °C), Max:99.8 °F (37.7 °C)    Recent Labs     20  1600 20  1803 20  2131 20  0803   POCGLU 156* 162* 139* 125*       I/O (24Hr): Intake/Output Summary (Last 24 hours) at 2020 1158  Last data filed at 2020 1100  Gross per 24 hour   Intake 5185.26 ml   Output 3175 ml   Net 2010.26 ml       Labs:  Daily Labs for 3 Days:    Hematology:  Recent Labs     20  0503 20  14320  0503   WBC 20.0* 21.0* 12.5*   HGB 11.3* 12.0* 7.9*   HCT 36.7* 39.2* 25.5*    385 268     Chemistry:  Recent Labs     20  0306  20  1432 20  0503 20  1122      < > 137 133* 137  --    K 4.2   < > 5.7* 5.2 4.9  --    CL  --    < > 106 107 108*  --    CO2  --    < > 20 19* 19*  --    GLUCOSE  --    < > 189* 179* 154*  --    BUN  --    < > 16 16 16  --    CREATININE  --    < > 1.57* 1.60* 1.37*  --    MG  --   --   --   --  1.3*  --    CALCIUM  --    < > 8.0* 7.7* 8.0*  --    CAION 1.22  --   --   --   --  1.12*   PHOS  --   --   --   --  2.7  --     < > = values in this interval not displayed.      Recent Labs     20  1720 20  1432   PROT 8.4*  --    LABALBU 3.1*  --    AST 32  --    ALT 21  --    ALKPHOS 112  --    BILITOT 0.47  --    LIPASE 253* 198*       paraclinics reviewed as posted  Physical Examination:      General appearance: alert, cooperative and no distress  Mental Status: oriented to person, place and time and normal affect  Lungs: clear to auscultation bilaterally, normal effort  Heart: regular rate and rhythm, no murmur,  Abdomen: soft, tender, nondistended, bowel sounds present all four quadrants, no masses, hepatomegaly or splenomegaly  Extremities: no edema, redness or tenderness in the calves  Skin: no gross lesions, rashes, or induration  ent perrla with eomi conjunctivae pink sclerae clear  Neuro-oriented and alert cn2-12 intact cerebral and cerebellar intact reflexes +2/4 bilaterally  Vascular  Good dp tp carotids  Ortho- no masses or synovitis  Good rom  Lymphatics none palpated in cervical supraclavicular axillary  Or inguinal area    Assessment:        Primary Problem  <principal problem not specified>  Abdominal pain status post colostomy  Dehydration  Acute kidney injury  Type 2 diabetes  Status post nephrectomy  There are no active hospital problems to display for this patient. Past Medical History:   Diagnosis Date    Back pain     Cellulitis     Diabetes mellitus ([facility] [address]     Gout     History of kidney cancer 08/10/2020    Recent diagnosis    Hypertension         Plan:        1. Continue same plan of care  2. IV fluids  3. Pain control  4. Sliding scale insulin  5.  Remains in ICU      Electronically signed by Theo Toribio MD on 9/24/2020 at 11:58 AM

## 2020-09-24 NOTE — PROGRESS NOTES
Consulted for Loop Colostomy Care and Teaching          History: exploratory laparotomy, bowel resection, transverse loop colostomy, and LIOR drain placement on 9/23/2020. Also, s/p exploratory laparotomy bowel resection of proximal jejunum and descending colon with mobilization of splenic flexure and primary anastomosis of small bowel and colon along with placement of gastrostomy tube on 9/2/2020. OSTOMY ASSESSMENT:       09/24/20 1150   Colostomy RUQ Transverse   Placement Date/Time: 09/23/20 2548   Pre-existing: No  Timeout: Patient;Procedure;Site/Side;Appropriate Equipment; Consent Confirmed;Sterile Technique using full body drape;Site prepped with chlorhexidine;Correct Position  Inserted by: DR. Waldo Schlatter Loc. .. Stomal Appliance 2 piece;Clean;Dry; Intact; Changed   Flange Size (inches) 2.75 Inches   Stoma  Assessment Moist;Pink;Protrudes  (1 3/4 circular. bridge ends at edge of stoma, soft, sutured.)   Mucocutaneous Junction Intact   Peristomal Assessment Clean; Intact   Treatment Bag change;Site care   Stool Color   (serosanguinous effluent)       Equipment used  Coloplast two piece 2 3/4\" flange drainable system. Discussed with patient components of stoma care including:  Frequency of emptying appliance, frequency of changing appliance, change in size of stoma over 6-8 weeks, frequency of measuring stoma with sizing guide, assessment and care of peristomal skin, consistency of stomal tissue, how to obtain supplies, diet,  and consistency of stool. Patient observed technique of emptying appliance and observed an appliance change. Printed material given and reviewed with patient. Questions/concerns discussed with patient. Contact information given to patient to arrange a lesson with his wife present.          Plan of care:     Ostomy care:  Cut barrier to fit stoma with no more than 1/8\" of exposed skin  Apply an Adapt Barrier Ring to the cut edge of the pouching system prn  Empty the pouch when 1/3 to 1/2 full. Change the pouching system twice weekly and prn  Our goal is to keep the skin around the stoma intact and to have a dependable pouching system without leaks. The skin around the stoma should be intact and appear just like the skin on the opposite side of the abdomen. Call the [address] [place] [address] at [phone] with questions or concerns. Staff may message via [employer] by searching \"wound\".

## 2020-09-24 NOTE — OP NOTE
Operative Note      Patient: Tawanna Cheung  YOB: 1960  MRN: [id number]    Date of Procedure: 9/23/2020    Pre-Op Diagnosis:   1. Pneumoperitoneum  2. [place]-colonic anastomotic leak    Post-Op Diagnosis: Same       Procedure:  1. Exploratory laparotomy   2. Lysis of adhesions  3. Abdominal lavage  4. Diverting proximal transverse loop colostomy     Surgeon(s):  Shonda Bowie DO    Assistant:   Rikki Sotelo DO, PGY-5  Merlinda Lover, DO, PGY-2    Anesthesia: General    Estimated Blood Loss (mL): 70MS    Complications: None    Specimens: none      Drains: 19Fr mckay drain to pelvis    Findings: Wound class IV      Indications: This is a 68-year-old male well-known to Dr. Juan Gonzalez, as he recently underwent exploratory laparotomy in combination with urology due to a large left renal mass obstructing the small bowel and invading the colon. The patient was being followed by urology in preparation for a left nephrectomy due to this large quickly growing renal mass but prior to his planned surgery ultimately showed up to the emergency department with a significant proximal small bowel obstruction due to the mass compressing his proximal jejunum. After resuscitation the patient was ultimately taken to the operating room by urology with Dr. Juan Gonzalez and underwent a left radical nephrectomy, jejunal small bowel resection with jejunojejunal anastomosis, segmental colectomy of splenic flexure with colocolonic anastomosis and gastrostomy tube placement. The patient did ultimately recover well and was at home tolerating a diet and having regular bowel movements until 24 hours prior to this admission he began having acute onset left sided abdominal pain and flank pain.   The patient came to the emergency department for an evaluation and was tachycardic in the 130s with a CT scan of the abdomen pelvis with oral contrast demonstrating what appeared to be a small leak at the colocolonic anastomosis as well as a significant amount of pneumoperitoneum. At this point we recommended proceeding to the operating room for exploration and possible resection of the anastomosis with diverting colostomy depending upon operative findings. The risks, benefits, alternative discussed with the patient and all questions were answered. Informed consent was obtained and witnessed. Detailed Description of Procedure: The patient was taken to the operating room placed in the supine position. General endotracheal anesthesia was induced. A timeout was performed confirming all personnel present, the patient ID, and the procedure to be performed. Preoperative antibiotics were given in the form of cefepime and Flagyl and EPC cuffs were placed for perioperative DVT prophylaxis. A Jill hugger was placed to keep the patient euthermic during the procedure and the patient's abdomen was then prepped and draped in the usual sterile fashion for an exploratory laparotomy. Using a #10 blade a midline laparotomy incision was made through the previous healing laparotomy incision. We then extended this incision through the subdermal tissues to the fascia using electrocautery. The fascia was exposed and the previous 0-looped PDS sutures were cut and removed. The fascia was divided with electrocautery and a finger was placed in the abdomen and swept around to ensure that there was no small bowel adhered to the anterior abdominal wall. At this point we were able to open the fascia along the length of the incision with electrocautery with ease. We did go ahead and extend the length of the incision for better exposure both proximally and distally. Upon entering into the abdomen we did not encounter any stool or enteric output floating throughout the abdomen.   Upon examination of the left side of the abdomen it was noted that the omentum was adhered to the small bowel and colon in this region and appeared to have walled off the colocolonic anastomosis itself. The omentum was mobilized and retracted cephalad and the underlying proximal small bowel was significantly adhered to itself in the left upper quadrant and left mid abdomen overlying the region of concern on the CT scan. The interloop small bowel adhesions were carefully lysed and we were able to run the small bowel from the ligament of Treitz to the terminal ileum. The jejuno-jejunal anastomosis appeared to be intact without evidence of leaking. We then turned our attention to evaluating the colon which is a little more difficult due to phlegmon and adhesions overlying the colocolonic anastomosis. Once the small bowel was mobilized and retracted laterally towards the midline, we were able to locate the transverse colon and traced this towards the anastomosis. The anastomosis was found to have a significant amount of phlegmon; however, there was no grossly perforated anastomosis, no obvious leaking stool, no evidence of ischemia or bowel necrosis, so we ultimately decided to leave the anastomosis intact and divert the patient's proximal colon to allow it to heal.    Multiple warm liters of saline were used to wash the peritoneal cavity and at this point we then turned our attention towards the creation of a proximal diverting loop colostomy. The patient's right colon was mobilized along the white line of Toldt. The hepatocolic ligament was divided using the LigaSure, effectively mobilizing the entire hepatic flexure. Throughout the process of mobilization of the right colon the duodenum was preserved as was the right ureter. At this point we had plenty of mobilization to bring out a loop colostomy in the right upper quadrant. A circular skin incision was made in the right upper quadrant with a #15 blade and then this was extended through the subcutaneous tissue down to the fascia with electrocautery.   The circular skin disc and the underlying core of subcutaneous tissue was fully excised and passed off the field. We then made an incision in the anterior fascia with electrocautery and the underlying muscle was split and retracted to allow division of the posterior fascia and peritoneum. Once this was performed 3 fingers were able to be placed through the stoma site and a Gara Do was placed on the loop of proximal transverse colon and this loop was carefully brought out through the stoma site without twisting the colonic mesentery or putting tension on the colon/proposed colostomy. Content with the positioning of our colostomy we washed out the abdomen with warm saline and then elected to place a 19 [place] round Maximiliano drain tunneled to the pelvis. The drain was secured to the skin of the abdominal wall using 3-0 nylon suture and we then proceeded with closure. The fascia was approximated using two, 0-looped PDS sutures in a running continuous fashion. Once close the skin was then approximated loosely with staples and half-inch iodoform gauze was packed between the staples. The incision was then protected and we turned our attention to maturing the ostomy. A mesenteric window was made beneath the loop colostomy and 1/2 inch Penrose drain was tunneled through the mesenteric window and secured to the skin on each side of the ostomy with interrupted 3-0 nylon sutures. The Penrose bridge was trimmed to size and we then proceeded to make a colotomy using electrocautery on the antimesenteric aspect of the loop colostomy. The colotomy was inverted, and then matured with interrupted 3-0 Vicryl sutures in a Yane fashion. The colonic mucosa appeared to be well perfused and it should be noted that the proximal end of the colostomy is on the patient's right and the distal end of the loop colostomy is on the patient's left. After this was performed the abdomen was then washed, dried and a temporary sterile dressing was placed over the midline incision.   We then fitted the loop colostomy with a stoma appliance and the patient was then awoken from anesthesia, extubated, and taken to the PACU in stable condition. All sponge and instrument counts were correct at the end the procedure. Dr. Sully Uriostegui was present for all portions of the procedure.        Electronically signed by Delmer North DO on 9/24/2020 at 6:16 PM

## 2020-09-24 NOTE — PLAN OF CARE
Problem: Pain:  Goal: Pain level will decrease  Description: Pain level will decrease  Outcome: Met This Shift     Problem: Falls - Risk of:  Goal: Will remain free from falls  Description: Will remain free from falls  Outcome: Met This Shift  Goal: Absence of physical injury  Description: Absence of physical injury  Outcome: Met This Shift     Problem: Skin Integrity:  Goal: Will show no infection signs and symptoms  Description: Will show no infection signs and symptoms  Outcome: Met This Shift  Goal: Absence of new skin breakdown  Description: Absence of new skin breakdown  Outcome: Met This Shift     Problem: Discharge Planning:  Goal: Participates in care planning  Description: Participates in care planning  Outcome: Met This Shift  Goal: Discharged to appropriate level of care  Description: Discharged to appropriate level of care  Outcome: Met This Shift     Problem: Anxiety/Stress:  Goal: Level of anxiety will decrease  Description: Level of anxiety will decrease  Outcome: Met This Shift     Problem: Aspiration:  Goal: Absence of aspiration  Description: Absence of aspiration  Outcome: Met This Shift     Problem: Pain:  Goal: Control of acute pain  Description: Control of acute pain  Outcome: Ongoing  Goal: Control of chronic pain  Description: Control of chronic pain  Outcome: Ongoing     Problem: Fluid Volume - Imbalance:  Goal: Absence of imbalanced fluid volume signs and symptoms  Description: Absence of imbalanced fluid volume signs and symptoms  Outcome: Ongoing

## 2020-09-25 LAB
ABSOLUTE EOS #: 0.86 K/UL (ref 0–0.44)
ABSOLUTE IMMATURE GRANULOCYTE: 0.07 K/UL (ref 0–0.3)
ABSOLUTE LYMPH #: 0.95 K/UL (ref 1.1–3.7)
ABSOLUTE MONO #: 1.03 K/UL (ref 0.1–1.2)
ANION GAP SERPL CALCULATED.3IONS-SCNC: 9 MMOL/L (ref 9–17)
BASOPHILS # BLD: 0 % (ref 0–2)
BASOPHILS ABSOLUTE: 0.03 K/UL (ref 0–0.2)
BUN BLDV-MCNC: 14 MG/DL (ref 8–23)
BUN/CREAT BLD: ABNORMAL (ref 9–20)
CALCIUM SERPL-MCNC: 8.3 MG/DL (ref 8.6–10.4)
CHLORIDE BLD-SCNC: 106 MMOL/L (ref 98–107)
CO2: 21 MMOL/L (ref 20–31)
CREAT SERPL-MCNC: 1.46 MG/DL (ref 0.7–1.2)
CULTURE: NORMAL
DIFFERENTIAL TYPE: ABNORMAL
EKG ATRIAL RATE: 138 BPM
EKG P AXIS: 32 DEGREES
EKG P-R INTERVAL: 144 MS
EKG Q-T INTERVAL: 354 MS
EKG QRS DURATION: 76 MS
EKG QTC CALCULATION (BAZETT): 536 MS
EKG R AXIS: 0 DEGREES
EKG T AXIS: 48 DEGREES
EKG VENTRICULAR RATE: 138 BPM
EOSINOPHILS RELATIVE PERCENT: 7 % (ref 1–4)
GFR AFRICAN AMERICAN: 59 ML/MIN
GFR NON-AFRICAN AMERICAN: 49 ML/MIN
GFR SERPL CREATININE-BSD FRML MDRD: ABNORMAL ML/MIN/{1.73_M2}
GFR SERPL CREATININE-BSD FRML MDRD: ABNORMAL ML/MIN/{1.73_M2}
GLUCOSE BLD-MCNC: 127 MG/DL (ref 75–110)
GLUCOSE BLD-MCNC: 150 MG/DL (ref 75–110)
GLUCOSE BLD-MCNC: 155 MG/DL (ref 75–110)
GLUCOSE BLD-MCNC: 161 MG/DL (ref 75–110)
GLUCOSE BLD-MCNC: 165 MG/DL (ref 70–99)
HCT VFR BLD CALC: 26.3 % (ref 40.7–50.3)
HCT VFR BLD CALC: 31.1 % (ref 40.7–50.3)
HEMOGLOBIN: 8.1 G/DL (ref 13–17)
HEMOGLOBIN: 9.1 G/DL (ref 13–17)
IMMATURE GRANULOCYTES: 1 %
LYMPHOCYTES # BLD: 8 % (ref 24–43)
Lab: NORMAL
MAGNESIUM: 1.9 MG/DL (ref 1.6–2.6)
MCH RBC QN AUTO: 27.2 PG (ref 25.2–33.5)
MCH RBC QN AUTO: 27.4 PG (ref 25.2–33.5)
MCHC RBC AUTO-ENTMCNC: 29.3 G/DL (ref 28.4–34.8)
MCHC RBC AUTO-ENTMCNC: 30.8 G/DL (ref 28.4–34.8)
MCV RBC AUTO: 88.3 FL (ref 82.6–102.9)
MCV RBC AUTO: 93.7 FL (ref 82.6–102.9)
MONOCYTES # BLD: 8 % (ref 3–12)
NRBC AUTOMATED: 0 PER 100 WBC
NRBC AUTOMATED: 0 PER 100 WBC
PARTIAL THROMBOPLASTIN TIME: 31.6 SEC (ref 20.5–30.5)
PARTIAL THROMBOPLASTIN TIME: 35.7 SEC (ref 20.5–30.5)
PDW BLD-RTO: 15.5 % (ref 11.8–14.4)
PDW BLD-RTO: 15.6 % (ref 11.8–14.4)
PLATELET # BLD: 278 K/UL (ref 138–453)
PLATELET # BLD: 317 K/UL (ref 138–453)
PLATELET ESTIMATE: ABNORMAL
PMV BLD AUTO: 10.4 FL (ref 8.1–13.5)
PMV BLD AUTO: 10.5 FL (ref 8.1–13.5)
POTASSIUM SERPL-SCNC: 4.1 MMOL/L (ref 3.7–5.3)
RBC # BLD: 2.98 M/UL (ref 4.21–5.77)
RBC # BLD: 3.32 M/UL (ref 4.21–5.77)
RBC # BLD: ABNORMAL 10*6/UL
SEG NEUTROPHILS: 76 % (ref 36–65)
SEGMENTED NEUTROPHILS ABSOLUTE COUNT: 9.39 K/UL (ref 1.5–8.1)
SODIUM BLD-SCNC: 136 MMOL/L (ref 135–144)
SPECIMEN DESCRIPTION: NORMAL
TSH SERPL DL<=0.05 MIU/L-ACNC: 1.6 MIU/L (ref 0.3–5)
WBC # BLD: 12.3 K/UL (ref 3.5–11.3)
WBC # BLD: 12.8 K/UL (ref 3.5–11.3)
WBC # BLD: ABNORMAL 10*3/UL

## 2020-09-25 PROCEDURE — 85730 THROMBOPLASTIN TIME PARTIAL: CPT

## 2020-09-25 PROCEDURE — 6370000000 HC RX 637 (ALT 250 FOR IP): Performed by: NURSE PRACTITIONER

## 2020-09-25 PROCEDURE — 2500000003 HC RX 250 WO HCPCS: Performed by: NURSE PRACTITIONER

## 2020-09-25 PROCEDURE — 6370000000 HC RX 637 (ALT 250 FOR IP): Performed by: STUDENT IN AN ORGANIZED HEALTH CARE EDUCATION/TRAINING PROGRAM

## 2020-09-25 PROCEDURE — 6360000002 HC RX W HCPCS: Performed by: INTERNAL MEDICINE

## 2020-09-25 PROCEDURE — 2580000003 HC RX 258: Performed by: STUDENT IN AN ORGANIZED HEALTH CARE EDUCATION/TRAINING PROGRAM

## 2020-09-25 PROCEDURE — 2000000000 HC ICU R&B

## 2020-09-25 PROCEDURE — 6360000002 HC RX W HCPCS: Performed by: NURSE PRACTITIONER

## 2020-09-25 PROCEDURE — 2500000003 HC RX 250 WO HCPCS: Performed by: STUDENT IN AN ORGANIZED HEALTH CARE EDUCATION/TRAINING PROGRAM

## 2020-09-25 PROCEDURE — 99211 OFF/OP EST MAY X REQ PHY/QHP: CPT

## 2020-09-25 PROCEDURE — 6370000000 HC RX 637 (ALT 250 FOR IP): Performed by: INTERNAL MEDICINE

## 2020-09-25 PROCEDURE — 36415 COLL VENOUS BLD VENIPUNCTURE: CPT

## 2020-09-25 PROCEDURE — 82947 ASSAY GLUCOSE BLOOD QUANT: CPT

## 2020-09-25 PROCEDURE — 97162 PT EVAL MOD COMPLEX 30 MIN: CPT

## 2020-09-25 PROCEDURE — 83735 ASSAY OF MAGNESIUM: CPT

## 2020-09-25 PROCEDURE — 97530 THERAPEUTIC ACTIVITIES: CPT

## 2020-09-25 PROCEDURE — 6360000002 HC RX W HCPCS: Performed by: STUDENT IN AN ORGANIZED HEALTH CARE EDUCATION/TRAINING PROGRAM

## 2020-09-25 PROCEDURE — 85027 COMPLETE CBC AUTOMATED: CPT

## 2020-09-25 PROCEDURE — 93005 ELECTROCARDIOGRAM TRACING: CPT | Performed by: NURSE PRACTITIONER

## 2020-09-25 PROCEDURE — 80048 BASIC METABOLIC PNL TOTAL CA: CPT

## 2020-09-25 PROCEDURE — 94770 HC ETCO2 MONITOR DAILY: CPT

## 2020-09-25 PROCEDURE — 85025 COMPLETE CBC W/AUTO DIFF WBC: CPT

## 2020-09-25 PROCEDURE — 2580000003 HC RX 258: Performed by: NURSE PRACTITIONER

## 2020-09-25 PROCEDURE — 93005 ELECTROCARDIOGRAM TRACING: CPT | Performed by: STUDENT IN AN ORGANIZED HEALTH CARE EDUCATION/TRAINING PROGRAM

## 2020-09-25 PROCEDURE — 84443 ASSAY THYROID STIM HORMONE: CPT

## 2020-09-25 RX ORDER — METOPROLOL TARTRATE 5 MG/5ML
5 INJECTION INTRAVENOUS EVERY 6 HOURS PRN
Status: DISCONTINUED | OUTPATIENT
Start: 2020-09-25 | End: 2020-10-02 | Stop reason: HOSPADM

## 2020-09-25 RX ORDER — METOPROLOL TARTRATE 5 MG/5ML
5 INJECTION INTRAVENOUS ONCE
Status: COMPLETED | OUTPATIENT
Start: 2020-09-25 | End: 2020-09-25

## 2020-09-25 RX ORDER — METOPROLOL TARTRATE 50 MG/1
50 TABLET, FILM COATED ORAL EVERY 6 HOURS
Status: DISCONTINUED | OUTPATIENT
Start: 2020-09-25 | End: 2020-09-27

## 2020-09-25 RX ORDER — HEPARIN SODIUM 1000 [USP'U]/ML
4000 INJECTION, SOLUTION INTRAVENOUS; SUBCUTANEOUS PRN
Status: DISCONTINUED | OUTPATIENT
Start: 2020-09-25 | End: 2020-10-01

## 2020-09-25 RX ORDER — BISACODYL 10 MG
10 SUPPOSITORY, RECTAL RECTAL DAILY PRN
Status: DISCONTINUED | OUTPATIENT
Start: 2020-09-25 | End: 2020-09-27

## 2020-09-25 RX ORDER — MAGNESIUM SULFATE 1 G/100ML
1 INJECTION INTRAVENOUS ONCE
Status: COMPLETED | OUTPATIENT
Start: 2020-09-25 | End: 2020-09-25

## 2020-09-25 RX ORDER — METOPROLOL TARTRATE 5 MG/5ML
5 INJECTION INTRAVENOUS EVERY 6 HOURS
Status: DISCONTINUED | OUTPATIENT
Start: 2020-09-25 | End: 2020-09-25

## 2020-09-25 RX ORDER — HEPARIN SODIUM 1000 [USP'U]/ML
4000 INJECTION, SOLUTION INTRAVENOUS; SUBCUTANEOUS ONCE
Status: COMPLETED | OUTPATIENT
Start: 2020-09-25 | End: 2020-09-25

## 2020-09-25 RX ORDER — HEPARIN SODIUM 10000 [USP'U]/100ML
8.88 INJECTION, SOLUTION INTRAVENOUS CONTINUOUS
Status: DISCONTINUED | OUTPATIENT
Start: 2020-09-25 | End: 2020-10-01

## 2020-09-25 RX ORDER — HEPARIN SODIUM 1000 [USP'U]/ML
2000 INJECTION, SOLUTION INTRAVENOUS; SUBCUTANEOUS PRN
Status: DISCONTINUED | OUTPATIENT
Start: 2020-09-25 | End: 2020-10-01

## 2020-09-25 RX ADMIN — CIPROFLOXACIN 400 MG: 2 INJECTION, SOLUTION INTRAVENOUS at 09:45

## 2020-09-25 RX ADMIN — HEPARIN SODIUM 4000 UNITS: 1000 INJECTION INTRAVENOUS; SUBCUTANEOUS at 14:09

## 2020-09-25 RX ADMIN — OXYCODONE HYDROCHLORIDE 10 MG: 5 TABLET ORAL at 11:10

## 2020-09-25 RX ADMIN — METHOCARBAMOL TABLETS 750 MG: 750 TABLET, COATED ORAL at 05:43

## 2020-09-25 RX ADMIN — SODIUM CHLORIDE, PRESERVATIVE FREE 10 ML: 5 INJECTION INTRAVENOUS at 20:07

## 2020-09-25 RX ADMIN — HEPARIN SODIUM 2000 UNITS: 1000 INJECTION INTRAVENOUS; SUBCUTANEOUS at 20:01

## 2020-09-25 RX ADMIN — METHOCARBAMOL TABLETS 750 MG: 750 TABLET, COATED ORAL at 19:54

## 2020-09-25 RX ADMIN — PANTOPRAZOLE SODIUM 40 MG: 40 TABLET, DELAYED RELEASE ORAL at 05:43

## 2020-09-25 RX ADMIN — DEXTROSE AND SODIUM CHLORIDE: 5; 450 INJECTION, SOLUTION INTRAVENOUS at 12:22

## 2020-09-25 RX ADMIN — OXYCODONE HYDROCHLORIDE 10 MG: 5 TABLET ORAL at 08:05

## 2020-09-25 RX ADMIN — OXYCODONE HYDROCHLORIDE 10 MG: 5 TABLET ORAL at 19:54

## 2020-09-25 RX ADMIN — METRONIDAZOLE 500 MG: 500 INJECTION, SOLUTION INTRAVENOUS at 10:53

## 2020-09-25 RX ADMIN — METRONIDAZOLE 500 MG: 500 INJECTION, SOLUTION INTRAVENOUS at 04:08

## 2020-09-25 RX ADMIN — INSULIN LISPRO 3 UNITS: 100 INJECTION, SOLUTION INTRAVENOUS; SUBCUTANEOUS at 08:06

## 2020-09-25 RX ADMIN — HEPARIN SODIUM 5000 UNITS: 5000 INJECTION INTRAVENOUS; SUBCUTANEOUS at 04:08

## 2020-09-25 RX ADMIN — METOPROLOL TARTRATE 50 MG: 50 TABLET, FILM COATED ORAL at 14:23

## 2020-09-25 RX ADMIN — ACETAMINOPHEN 1000 MG: 500 TABLET ORAL at 22:10

## 2020-09-25 RX ADMIN — CIPROFLOXACIN 400 MG: 2 INJECTION, SOLUTION INTRAVENOUS at 21:22

## 2020-09-25 RX ADMIN — MAGNESIUM SULFATE HEPTAHYDRATE 1 G: 1 INJECTION, SOLUTION INTRAVENOUS at 08:43

## 2020-09-25 RX ADMIN — OXYCODONE HYDROCHLORIDE 10 MG: 5 TABLET ORAL at 15:11

## 2020-09-25 RX ADMIN — HEPARIN SODIUM AND DEXTROSE 8.88 UNITS/KG/HR: 10000; 5 INJECTION INTRAVENOUS at 14:10

## 2020-09-25 RX ADMIN — METOPROLOL TARTRATE 5 MG: 1 INJECTION, SOLUTION INTRAVENOUS at 08:52

## 2020-09-25 RX ADMIN — METOPROLOL TARTRATE 50 MG: 50 TABLET, FILM COATED ORAL at 08:01

## 2020-09-25 RX ADMIN — METHOCARBAMOL TABLETS 750 MG: 750 TABLET, COATED ORAL at 12:48

## 2020-09-25 RX ADMIN — ACETAMINOPHEN 1000 MG: 500 TABLET ORAL at 12:51

## 2020-09-25 RX ADMIN — HYDROMORPHONE HYDROCHLORIDE 0.5 MG: 1 INJECTION, SOLUTION INTRAMUSCULAR; INTRAVENOUS; SUBCUTANEOUS at 12:49

## 2020-09-25 RX ADMIN — ACETAMINOPHEN 1000 MG: 500 TABLET ORAL at 05:43

## 2020-09-25 RX ADMIN — METOPROLOL TARTRATE 5 MG: 5 INJECTION, SOLUTION INTRAVENOUS at 08:36

## 2020-09-25 RX ADMIN — HYDROMORPHONE HYDROCHLORIDE 0.5 MG: 1 INJECTION, SOLUTION INTRAMUSCULAR; INTRAVENOUS; SUBCUTANEOUS at 17:42

## 2020-09-25 RX ADMIN — INSULIN LISPRO 3 UNITS: 100 INJECTION, SOLUTION INTRAVENOUS; SUBCUTANEOUS at 04:08

## 2020-09-25 RX ADMIN — METOPROLOL TARTRATE 50 MG: 50 TABLET, FILM COATED ORAL at 19:54

## 2020-09-25 RX ADMIN — HYDROMORPHONE HYDROCHLORIDE 0.5 MG: 1 INJECTION, SOLUTION INTRAMUSCULAR; INTRAVENOUS; SUBCUTANEOUS at 22:10

## 2020-09-25 RX ADMIN — DEXTROSE AND SODIUM CHLORIDE: 5; 450 INJECTION, SOLUTION INTRAVENOUS at 00:02

## 2020-09-25 RX ADMIN — METRONIDAZOLE 500 MG: 500 INJECTION, SOLUTION INTRAVENOUS at 19:25

## 2020-09-25 ASSESSMENT — PAIN DESCRIPTION - PAIN TYPE
TYPE: SURGICAL PAIN
TYPE: SURGICAL PAIN

## 2020-09-25 ASSESSMENT — PAIN SCALES - GENERAL
PAINLEVEL_OUTOF10: 6
PAINLEVEL_OUTOF10: 10
PAINLEVEL_OUTOF10: 7
PAINLEVEL_OUTOF10: 8
PAINLEVEL_OUTOF10: 4
PAINLEVEL_OUTOF10: 7
PAINLEVEL_OUTOF10: 8
PAINLEVEL_OUTOF10: 9
PAINLEVEL_OUTOF10: 8
PAINLEVEL_OUTOF10: 8

## 2020-09-25 ASSESSMENT — PAIN DESCRIPTION - DESCRIPTORS
DESCRIPTORS: SHARP
DESCRIPTORS: PRESSURE

## 2020-09-25 ASSESSMENT — PAIN DESCRIPTION - FREQUENCY
FREQUENCY: CONTINUOUS
FREQUENCY: CONTINUOUS

## 2020-09-25 ASSESSMENT — PAIN DESCRIPTION - ONSET
ONSET: ON-GOING
ONSET: ON-GOING

## 2020-09-25 ASSESSMENT — PAIN DESCRIPTION - PROGRESSION
CLINICAL_PROGRESSION: NOT CHANGED
CLINICAL_PROGRESSION: NOT CHANGED

## 2020-09-25 ASSESSMENT — PAIN DESCRIPTION - LOCATION
LOCATION: ABDOMEN
LOCATION: ABDOMEN

## 2020-09-25 ASSESSMENT — PAIN DESCRIPTION - ORIENTATION
ORIENTATION: LOWER
ORIENTATION: LOWER

## 2020-09-25 NOTE — PROGRESS NOTES
Physical Therapy    [address]  Initial Assessment    NAME: Daniel Painting II  : 1960  MRN: [id number]    Date of Service: 2020  Date of Procedure: 2020   Pre-Op Diagnosis:   1. Pneumoperitoneum  2. Pemberton-colonic anastomotic leak   Post-Op Diagnosis: Same      Procedure:  1. Exploratory laparotomy   2. Lysis of adhesions  3. Abdominal lavage  4. Diverting proximal transverse loop colostomy      Discharge Recommendations:  Patient would benefit from continued therapy after discharge   PT Equipment Recommendations  Equipment Needed: No    Assessment   Body structures, Functions, Activity limitations: Decreased functional mobility ; Decreased endurance;Decreased balance  Assessment: Pt required Mod A for mobility. Pain greatly limiting mobility. Prognosis: Good  Decision Making: Medium Complexity  PT Education: Plan of Care;General Safety  REQUIRES PT FOLLOW UP: Yes  Activity Tolerance  Activity Tolerance: Patient limited by fatigue;Patient limited by endurance; Patient limited by pain       Patient Diagnosis(es): The encounter diagnosis was Free intraperitoneal air. has a past medical history of Back pain, Cellulitis, Colostomy RUQ, Gout, H/O left nephrectomy, HTN, and T2DM. has a past surgical history that includes Ankle surgery; knee surgery (Bilateral); Carpal tunnel release (Bilateral); Cystoscopy (Right, 2020); total nephrectomy (Left, 2020); Abdominal exploration surgery (2020); hc cath power picc triple (9/3/2020); Kidney surgery (Left, 2020); Small intestine surgery (N/A, 2020); Abdomen surgery (2020); and laparotomy (N/A, 2020).     Restrictions  Restrictions/Precautions  Restrictions/Precautions: Up as Tolerated, Surgical Protocols, Fall Risk  Required Braces or Orthoses?: Yes  Required Braces or Orthoses  Other: Abdominal Binder  Vision/Hearing        Subjective  General  Patient assessed for rehabilitation services?: Yes  Family / Caregiver Present: No  Follows Commands: Within Functional Limits  General Comment  Comments: Pt pleasant and cooperative. Subjective  Subjective: Pt wanting to get up to chair. Pain Screening  Patient Currently in Pain: Yes          Orientation  Orientation  Overall Orientation Status: Within Normal Limits  Social/Functional History  Social/Functional History  Lives With: Spouse  Type of Home: House  Home Layout: One level  Home Access: Stairs to enter without rails  Entrance Stairs - Number of Steps: 2  Bathroom Shower/Tub: Tub/Shower unit  Home Equipment: 4 wheeled walker  ADL Assistance: Independent  Homemaking Assistance: Independent  Ambulation Assistance: Independent  Transfer Assistance: Independent  Active : Yes  Occupation: Full time employment  Type of occupation: [employer]  Cognition   Cognition  Overall Cognitive Status: WFL    Objective    AROM RLE (degrees)  RLE AROM: WFL  AROM LLE (degrees)  LLE AROM : WFL  AROM RUE (degrees)  RUE AROM : WFL  AROM LUE (degrees)  LUE AROM : WFL  Strength RLE  Strength RLE: WFL  Strength LLE  Strength LLE: WFL  Strength RUE  Strength RUE: WFL  Strength LUE  Strength LUE: WFL  Motor Control  Gross Motor?: WFL  Sensation  Overall Sensation Status: WFL  Bed mobility  Rolling to Left: Moderate assistance  Supine to Sit: Moderate assistance  Sit to Supine: Moderate assistance  Scooting: Moderate assistance  Pt sat EOB requiring CGA. Transfers  Sit to Stand: Moderate Assistance  Stand to sit: Moderate Assistance  Bed to Chair: Moderate assistance  Ambulation  Ambulation?: Yes  Ambulation 1  Surface: level tile  Device: Rolling Walker  Assistance: Minimal assistance  Gait Deviations: Slow Susu;Decreased step height;Decreased step length  Distance: Pt ambulated 5 ft to chair. Pt with Art line.      Balance  Sitting - Static: Good;-  Sitting - Dynamic: Fair;+  Standing - Static: Good;-  Standing - Dynamic: Fair;+  Comments: Standing balance with rolling walker Plan   Plan  Times per week: 5-6x/week  Current Treatment Recommendations: Balance Training, Endurance Training, Safety Education & Training, Functional Mobility Training, Transfer Training, Gait Training, Stair training  Safety Devices  Type of devices: Left in chair, Call light within reach, Nurse notified    AM-PAC Score  AM-PAC Inpatient Mobility Raw Score : 11 (09/25/20 1456)  AM-PAC Inpatient T-Scale Score : 33.86 (09/25/20 1456)  Mobility Inpatient CMS 0-100% Score: 72.57 (09/25/20 1456)  Mobility Inpatient CMS G-Code Modifier : CL (09/25/20 1456)    Goals  Short term goals  Time Frame for Short term goals: 14 visits  Short term goal 1: Indpendent bed mobility  Short term goal 2: Independent transfers  Short term goal 3: Independent ambulation with least restrictive device 150 ft  Short term goal 4: Pt to navigate 2 stairs with SBA  Short term goal 5: Pt to tolerate 30 minutes of activity to improve endurance. Patient Goals   Patient goals : Get stronger. Move without pain.        Therapy Time   Individual Concurrent Group Co-treatment   Time In 1110         Time Out 1133         Minutes 23         Timed Code Treatment Minutes: [address] [facility],[address]  [place],

## 2020-09-25 NOTE — PROGRESS NOTES
ICU PROGRESS NOTE      PATIENT NAME: Farida Hays II  MEDICAL RECORD NO. [id number]  DATE: 9/25/2020    PRIMARY CARE PHYSICIAN: Blanca Duggan MD    HD: # 3    ASSESSMENT    Patient Active Problem List   Diagnosis    Cellulitis    Type [facility] diabetes mellitus without complication [facility]    Essential hypertension    Morbid obesity due to excess calories ([facility]    Chronic kidney disease (CKD)    DEBBI (acute kidney injury) ([facility]    Bowel obstruction ([facility]    Small bowel obstruction ([facility]    Severe malnutrition ([facility]    Hyperglycemia    Renal cell cancer ([facility]       Chief Complaint: Lam forbes\"    MEDICAL DECISION MAKING AND PLAN    1. Neurological  1. Pain/Sedation/Agitation  1. Fentanyl PCA - d/c per GS and start dilaudid  2. Tylenol, motrin, robaxin, neurontin; starla 10 q4hr  2. Cardiovascular  1. HR - monitor tachycardia  1. Home med lopressor 50 BID  2. BP  1. Hyertension - may restart home hydralazine  3. Respiratory  1. Maintain O2 sat >90  2. Incentive spirometer use - at least 10 times per hour while awake  4. Gastrointestinal  1. S/p exploratory laparotomy, transverse loop colostomy and LIOR drain placement  2. Diet - NPO, OK for sips with meds and ice chips  3. Clamp gastrostomy tube per GS  4. GI Prophylaxis - hoem protonix 40 daily  5. FEN/Renal  1. Urine Output 2685cc in last 24 - 1 cc/kg/hr  1. Strict I/Os  2. BUN 14 Cr 1.46 (1.37) - increasing  3. D51/2NS at total 125cc/hr  6. Heme/ID  1. Afebrile  2. WBC 12.3 - continue cipro/flagyl x4 days from source control; will discuss with GS  3. Hgb 8.1 and stable - continue to monitor - hgb in AM  4. DVT ppx -heparin TID  7. Endocrine  1. DMII - high dose sliding scale insulin  8. MSK  1. PT/OT - OOB and ambulate today  9.  Dispo - continue ICU care until HR better under control      CHECKLIST    RASS: 0  RESTRAINTS: no  IVF: D51/2NS at 125cc/hr total fluids  NUTRITION: NPO - OK for ice chips  ANTIBIOTICS: cipro/flagyl  GI: pepcid  DVT: heparin TID  GLYCEMIC CONTROL: good - HDSSI  HOB >45: yes    SUBJECTIVE    Álvaro Denton II was seen and examined this AM. Continues to be tachycardic - down to 100s overnight while sleeping but 120 this AM while awake. States he still has abdominal pain diffusely. No output from ostomy. States he is belching but no nausea or vomiting.  UO adequate      OBJECTIVE  VITALS: Temp: Temp: 98.5 °F (36.9 °C)Temp  Av.8 °F (37.1 °C)  Min: 98.2 °F (36.8 °C)  Max: 99.8 °F (07.4 °C) BP Systolic (96ZMY), WCC:657 , Min:138 , GDI:815   Diastolic (35GEN), LINDA:70, Min:71, Max:76   Pulse Pulse  Av.9  Min: 98  Max: 135 Resp Resp  Avg: 15  Min: 10  Max: 26 Pulse ox SpO2  Av.1 %  Min: 92 %  Max: 100 %    GENERAL: alert, no distress  NEURO: CNII-XII grossly intact; moving extremities equally  HEENT: EOMI  LUNGS: symmetric rise and fall of chest wall; normal effort  HEART: regular rhythm and tachycardic  ABDOMEN: softly distended, tender to palpation diffusely; LIOR drain with SS fluid, ostomy pink and healthy - without stool  EXTREMITY: no cyanosis, clubbing or edema    Drain/tube output:  20cc SS from LIOR drain    LAB:  CBC:   Recent Labs     20  1432 20  0503 20  0424   WBC 21.0* 12.5* 12.3*   HGB 12.0* 7.9* 8.1*   HCT 39.2* 25.5* 26.3*   MCV 89.7 88.2 88.3    268 278     BMP:   Recent Labs     20  2005 20  0503 20  0424   * 137 136   K 5.2 4.9 4.1    108* 106   CO2 19* 19* 21   BUN 16 16 14   CREATININE 1.60* 1.37* 1.46*   GLUCOSE 179* 154* 165*         RADIOLOGY:  No new imaging to review    Subhash Cabrera DO  Trauma Resident  2020 7:02 AM

## 2020-09-25 NOTE — CONSULTS
[facility]  In Patient Cardiology Consult             Date:   9/25/2020  Patient name: Lang Hopkins II  Date of admission:  9/22/2020  4:21 PM  MRN:   [id number]  YOB: 1960    Reason for Admission: Intraperitoneal free air    CHIEF COMPLAINT:    Intraperitoneal free air    History Obtained From:  Pt and chart    HISTORY OF PRESENT ILLNESS:    This is a 56year old male who presents with abdominal pain. Found on CT to have intraperitoneal free air. Was taken to OR for exp lap. Now POD #2 exploratory laparotomy, transverse loop colostomy and LIOR drain placement. Has recent L Nephrectomy and Exp lap / resection of descending colon on 9/2/20. We were consulted today for tachycardia. HR in 150-180s. One ECG concerning for AF. Was given IV lopressors x 2- then returned to Sinus Tachy. Past Medical History:   has a past medical history of Back pain, Cellulitis, Colostomy RUQ, Gout, H/O left nephrectomy, HTN, and T2DM. Past Surgical History:   has a past surgical history that includes Ankle surgery; knee surgery (Bilateral); Carpal tunnel release (Bilateral); Cystoscopy (Right, 9/1/2020); total nephrectomy (Left, 09/02/2020); Abdominal exploration surgery (09/02/2020); hc cath power picc triple (9/3/2020); Kidney surgery (Left, 9/2/2020); Small intestine surgery (N/A, 9/2/2020); Abdomen surgery (09/23/2020); and laparotomy (N/A, 9/23/2020). Home Medications:    Prior to Admission medications    Medication Sig Start Date End Date Taking? Authorizing Provider   sodium bicarbonate 650 MG tablet Take 1 tablet by mouth 2 times daily 9/17/20   Lucy Almeida MD   gabapentin (NEURONTIN) 300 MG capsule Take 1 capsule by mouth 3 times daily for 7 days.  9/11/20 9/18/20  Adwoa Goldsmith MD   insulin glargine (LANTUS SOLOSTAR) 100 UNIT/ML injection pen Inject 60 Units into the skin nightly 9/11/20   Adwoa Goldsmith MD   insulin lispro, 1 Unit Dial, (HUMALOG KWIKPEN) 100 UNIT/ML SOPN Inject 0-6 Units into the skin 3 times daily (before meals) **Corrective Low Dose Algorithm**  Glucose: Dose:               No Insulin  140-199 1 Unit  200-249 2 Units  250-299 3 Units  300-349 4 Units  350-399 5 Units  Over 399 6 Units 9/11/20   Steve Bautista MD   ferrous sulfate (IRON 325) 325 (65 Fe) MG tablet Take 1 tablet by mouth daily (with breakfast) 9/11/20   Steve Bautista MD   pantoprazole (PROTONIX) 40 MG tablet Take 1 tablet by mouth daily 9/11/20   Steve Bautista MD   blood glucose monitor strips Test 4 times a day & as needed for symptoms of irregular blood glucose. Dispense sufficient amount for indicated testing frequency plus additional to accommodate PRN testing needs. 9/11/20   Steve Bautista MD   Alcohol Swabs PADS 1 each by Does not apply route 4 times daily 9/11/20   Steve Bautista MD   Insulin Pen Needle ([employer] PEN NEEDLES) 31G X 6 MM MISC 1 each by Does not apply route daily 9/11/20   Steve Bautista MD   Lancets MISC 1 each by Does not apply route 3 times daily 9/11/20   Steve Bautista MD   glucose monitoring kit (FREESTYLE) monitoring kit 1 kit by Does not apply route daily 9/11/20   Steve Bautista MD   traMADol (ULTRAM) 50 MG tablet Take 100 mg by mouth every 6 hours as needed for Pain.     Historical Provider, MD   Melatonin 10 MG TABS Take 10 mg by mouth nightly as needed (SLEEP)    Historical Provider, MD   vitamin C (ASCORBIC ACID) 500 MG tablet Take 500 mg by mouth daily    Historical Provider, MD   acetaminophen (TYLENOL) 500 MG tablet Take 2 tablets by mouth every 6 hours as needed for Pain 8/11/20 8/21/20  Ha Monsivais MD   cyclobenzaprine (FLEXERIL) 10 MG tablet Take 1 tablet by mouth 3 times daily as needed for Muscle spasms  Patient taking differently: Take 10 mg by mouth 2 times daily as needed for Muscle spasms  8/11/20   Ha Monsivais MD   aspirin 81 MG tablet Take 81 mg by mouth nightly     Historical Provider, MD   pravastatin (PRAVACHOL) 20 MG tablet Take 20 mg by mouth daily Historical Provider, MD   metoprolol (LOPRESSOR) 50 MG tablet Take 50 mg by mouth 2 times daily    Historical Provider, MD   allopurinol (ZYLOPRIM) 100 MG tablet Take 100 mg by mouth daily    Historical Provider, MD   zolpidem (AMBIEN) 10 MG tablet Take by mouth nightly as needed for Sleep    Historical Provider, MD   hydrALAZINE (APRESOLINE) 25 MG tablet Take 25 mg by mouth 3 times daily     Historical Provider, MD       Allergies:  Ampicillin; Pcn [penicillins]; and Tape [adhesive tape]    Social History:   reports that he has never smoked. He has never used smokeless tobacco. He reports current alcohol use. He reports that he does not use drugs. Family History:   Neg for early CAD    REVIEW OF SYSTEMS:    · Constitutional: there has been no unanticipated weight loss. There's been No change in energy level, No change in activity level. · Eyes: No visual changes or diplopia. No scleral icterus. · ENT: No Headaches, hearing loss or vertigo. No mouth sores or sore throat. · Cardiovascular: As HPI  · Respiratory: As HPI  · Gastrointestinal: No abdominal pain, appetite loss, blood in stools. No change in bowel or bladder habits. · Genitourinary: No dysuria, trouble voiding, or hematuria. · Musculoskeletal:  No gait disturbance, No weakness or joint complaints. · Integumentary: No rash or pruritis. · Neurological: No headache, diplopia, change in muscle strength, numbness or tingling. No change in gait, balance, coordination, mood, affect, memory, mentation, behavior. · Psychiatric: No anxiety, or depression. · Endocrine: No temperature intolerance. No excessive thirst, fluid intake, or urination. No tremor. · Hematologic/Lymphatic: No abnormal bruising or bleeding, blood clots or swollen lymph nodes. · Allergic/Immunologic: No nasal congestion or hives.     PHYSICAL EXAM:    Physical Examination:    /74   Pulse 108   Temp 98.1 °F (36.7 °C) (Oral)   Resp 13   Ht 5' 11\" (1.803 m)   Wt 248 lb 3.8 oz (112.6 kg)   SpO2 95%   BMI 34.62 kg/m²    Constitutional and General Appearance: alert, cooperative, no distress and appears stated age  [de-identified]: PERRL, no cervical lymphadenopathy. No masses palpable. Normal oral mucosa  Respiratory:  · Normal excursion and expansion without use of accessory muscles  · Resp Auscultation: Good respiratory effort. No for increased work of breathing. On auscultation: Clear  Cardiovascular:  · The apical impulse is not displaced  · Heart tones are crisp and normal. tachy regular S1 and S2. Murmurs: none  · Jugular venous pulsation Normal  · The carotid upstroke is normal in amplitude and contour without delay or bruit  · Peripheral pulses are symmetrical and full   Abdomen:  · No masses or tenderness  · Bowel sounds present  Extremities:  ·  No Cyanosis or Clubbing  ·  Lower extremity edema: none  ·  Skin: Warm and dry  Neurological:  · Alert and oriented.   · Moves all extremities well  · No abnormalities of mood, affect, memory, mentation, or behavior are noted    DATA:    Diagnostics:      EKG:   Results for orders placed or performed during the hospital encounter of 09/22/20   EKG 12 Lead   Result Value Ref Range    Ventricular Rate 105 BPM    Atrial Rate 105 BPM    P-R Interval 154 ms    QRS Duration 84 ms    Q-T Interval 348 ms    QTc Calculation (Bazett) 459 ms    P Axis 32 degrees    R Axis -13 degrees    T Axis 10 degrees    Narrative    Sinus tachycardia with Premature supraventricular complexes  Minimal voltage criteria for LVH, may be normal variant  Borderline ECG       Echo:  Results for orders placed or performed during the hospital encounter of 08/31/20   Echocardiogram 2D W M-Mode   Result Value Ref Range    Left Ventricular Ejection Fraction 60     LVEF MODALITY ECHO     Narrative    [facility]    Transthoracic Echocardiography Report (TTE)     Patient Name Marco Antonio Luke     Date of Study               09/01/2020                [facility]      Date of PM  ----------------------------------------------------------------------------    ----------------------------------------------------------------------------   Electronically signed by [facility] [place]) on   2020 02:42 PM  ----------------------------------------------------------------------------  FINDINGS  Left Atrium  Left atrium is normal in size. Left Ventricle  Left ventricle is normal in size. Mild left ventricular hypertrophy. Global left ventricular systolic function is normal with an estimated  ejection fraction of 60 % . Due to the technical limitations of this study not all wall segments were  visualized. Right Atrium  Right atrium is normal in size. Right Ventricle  Normal right ventricular size and function. Mitral Valve  Normal mitral valve structure. Trivial mitral regurgitation. Aortic Valve  Normal aortic valve structure and function without stenosis or  regurgitation. Tricuspid Valve  Normal tricuspid valve leaflets. Trivial tricuspid regurgitation. Pulmonic Valve  The pulmonic valve is normal in structure. Pericardial Effusion  No significant pericardial effusion is seen. Pleural Effusion  Large pleural effusion. Miscellaneous  Normal aortic root dimension.     M-mode / 2D Measurements & Calculations:      LVIDd:4.27 cm(3.7 - 5.6 cm)      Diastolic XCNJDM:69.4 ml   LVIDs:2.59 cm(2.2 - 4.0 cm)      Systolic DSLPDH:01.0 ml   IVSd:1.16 cm(0.6 - 1.1 cm)       Aortic Root:3.7 cm(2.0 - 3.7 cm)   LVPWd:1.14 cm(0.6 - 1.1 cm)      LA Dimension: 3.7 cm(1.9 - 4.0 cm)   Fractional Shortenin.34 %   Calculated LVEF (%): 77.66 %      Mitral:                                Aortic      Peak E-Wave: 0.70 m/s                  Peak Velocity: 1.15 m/s   Peak A-Wave: 0.87 m/s                  Mean Velocity: 0.79 m/s   E/A Ratio: 0.81                        Peak Gradient: 5.29 mmHg   Peak Gradient: 1.98 mmHg               Mean Gradient: 3 mmHg   Deceleration Time: 204 msec                                             AV VTI: 20.4 cm      Labs:     CBC:   Recent Labs     09/24/20  0503 09/25/20  0424   WBC 12.5* 12.3*   HGB 7.9* 8.1*   HCT 25.5* 26.3*    278     BMP:   Recent Labs     09/24/20  0503 09/25/20  0424    136   K 4.9 4.1   CO2 19* 21   BUN 16 14   CREATININE 1.37* 1.46*   LABGLOM 53* 49*   GLUCOSE 154* 165*     BNP: No results for input(s): BNP in the last 72 hours. PT/INR: No results for input(s): PROTIME, INR in the last 72 hours. APTT:No results for input(s): APTT in the last 72 hours. CARDIAC ENZYMES:  Recent Labs     09/24/20  0503 09/24/20  1400 09/24/20  2009   TROPHS 30* 28* 24*     FASTING LIPID PANEL:  Lab Results   Component Value Date    HDL 31 06/22/2020    TRIG 115 09/05/2020     LIVER PROFILE:  Recent Labs     09/22/20  1720   AST 32   ALT 21   LABALBU 3.1*     IMPRESSION:    Patient Active Problem List   Diagnosis    Cellulitis    Type [facility] diabetes mellitus [facility] complication [facility]    Essential hypertension    Morbid obesity due to excess calories (HCC)    Chronic kidney disease (CKD)    DEBBI (acute kidney injury) ([facility]    Bowel obstruction (HCC)    Small bowel obstruction (HCC)    Severe malnutrition (HCC)    Hyperglycemia    Renal cell cancer ([facility]     - New onset Parox AFib- with RVR- exacerbated by abdominal issues- now Sinus Tachycardia  - Sinus Tachy- improving  - Abdominal Free Air- s/p POD #2 exploratory laparotomy, transverse loop colostomy and LIOR drain placement.  - Recent L Nephrectomy and Exp lap / resection of descending colon on 9/2/20.  - Preserved LVEF Recent Echo  - LISA    RECOMMENDATIONS:  - increase lopressor 50 mg po q6  - IV lopressor PRN  - check TSH  - had recent Echo  - if again goes in to AF- plan for amio bolus/dip    Discussed with patient and nursing. Thank you for allowing me to participate in the care of this patient, please do not hesitate to call if you have any questions.     Jarad Moreno, , [facility], [address] [facility]  [email or web address]  [phone]

## 2020-09-25 NOTE — PROGRESS NOTES
Consulted for Loop Colostomy Care and Teaching              History: exploratory laparotomy, bowel resection, transverse loop colostomy, and LIOR drain placement on 9/23/2020. Also, s/p exploratory laparotomy bowel resection of proximal jejunum and descending colon with mobilization of splenic flexure and primary anastomosis of small bowel and colon along with placement of gastrostomy tube on 9/2/2020. Patient able to accurately recall steps of ostomy care, dietary considerations, and changes in stoma over next 6-8 weeks. His wife visited last evening and took the written material home for review. OSTOMY ASSESSMENT:     09/25/20 1032   Colostomy RUQ Transverse   Placement Date/Time: 09/23/20 6300   Pre-existing: No  Timeout: Patient;Procedure;Site/Side;Appropriate Equipment; Consent Confirmed;Sterile Technique using full body drape;Site prepped with chlorhexidine;Correct Position  Inserted by: DR. Corrinne Gamma Loca. .. Stomal [employer]liance 2 piece;Clean;Dry; Intact   Flange Size (inches) 2.75 Inches   Stoma  Assessment Moist;Pink  (1 3/4\" circular. soft bridge sutured at j. )   Stool Appearance Watery   Stool Color   (serous)     Serous effluent in pouch. No flatus. Equipment used  [employer] two piece 2 3/4\" flange drainable system. Plan of care:   Reinforce steps of ostomy care. Encourage patient to empty own pouch. Ostomy care:  Cut barrier to fit stoma with no more than 1/8\" of exposed skin. Currently 1 3/4\" circular. Apply an [employer] Barrier Ring to the cut edge of the pouching system, prn  Empty the pouch when 1/3 to 1/2 full. Change the pouching system twice weekly and prn hygiene, leakage. Our goal is to keep the skin around the stoma intact and to have a dependable pouching system without leaks. The skin around the stoma should be intact and appear just like the skin on the opposite side of the abdomen. Call the [address] [place] [address] at [phone] with questions or concerns.     Also, encourage patient to reposition self every 1 hour for 15 minutes off each side to offload buttocks while seated in chair. Static air cushion in use.

## 2020-09-25 NOTE — PLAN OF CARE
Problem: Pain:  Goal: Pain level will decrease  Description: Pain level will decrease  9/25/2020 0850 by Karli Stevenson RN  Outcome: Ongoing  9/25/2020 0054 by Edna Esparza RN  Outcome: Ongoing  Goal: Control of acute pain  Description: Control of acute pain  9/25/2020 0850 by Karli Stevenson RN  Outcome: Ongoing  9/25/2020 0054 by Edna Esparza RN  Outcome: Ongoing  Goal: Control of chronic pain  Description: Control of chronic pain  9/25/2020 0850 by Karli Stevenson RN  Outcome: Ongoing  9/25/2020 0054 by Edna Esparza RN  Outcome: Ongoing     Problem: Falls - Risk of:  Goal: Will remain free from falls  Description: Will remain free from falls  9/25/2020 0850 by Karli Stevenson RN  Outcome: Ongoing  9/25/2020 0054 by Edna Esparza RN  Outcome: Ongoing  Goal: Absence of physical injury  Description: Absence of physical injury  9/25/2020 0850 by Karli Stevenson RN  Outcome: Ongoing  9/25/2020 0054 by Edna Esparza RN  Outcome: Ongoing     Problem: Skin Integrity:  Goal: Will show no infection signs and symptoms  Description: Will show no infection signs and symptoms  9/25/2020 0850 by Karli Stevenson RN  Outcome: Ongoing  9/25/2020 0054 by Edna Esparza RN  Outcome: Ongoing  Goal: Absence of new skin breakdown  Description: Absence of new skin breakdown  9/25/2020 0850 by Karli Stevenson RN  Outcome: Ongoing  9/25/2020 0054 by Edna Esparza RN  Outcome: Ongoing     Problem: Discharge Planning:  Goal: Participates in care planning  Description: Participates in care planning  9/25/2020 0850 by Karli Stevenson RN  Outcome: Ongoing  9/25/2020 0054 by Edna Esparza RN  Outcome: Ongoing  Goal: Discharged to appropriate level of care  Description: Discharged to appropriate level of care  9/25/2020 0850 by Karli Stevenson RN  Outcome: Ongoing  9/25/2020 0054 by Edna Esparza RN  Outcome: Ongoing     Problem: Anxiety/Stress:  Goal: Level of anxiety will decrease  Description: Level of anxiety will decrease  9/25/2020 0850 by Karli Stevenson RN  Outcome: Ongoing  9/25/2020 0054 by Lynn Frost RN  Outcome: Ongoing     Problem: Aspiration:  Goal: Absence of aspiration  Description: Absence of aspiration  9/25/2020 0850 by Sebastien Tinajero RN  Outcome: Ongoing  9/25/2020 0054 by yLnn Frost RN  Outcome: Ongoing     Problem: Fluid Volume - Imbalance:  Goal: Absence of imbalanced fluid volume signs and symptoms  Description: Absence of imbalanced fluid volume signs and symptoms  9/25/2020 0850 by Sebastien Tinajero RN  Outcome: Ongoing  9/25/2020 0054 by Lynn Frost RN  Outcome: Ongoing     Problem: Sleep Pattern Disturbance:  Goal: Appears well-rested  Description: Appears well-rested  9/25/2020 0850 by Sebastien Tinajero RN  Outcome: Ongoing  9/25/2020 0054 by Lynn Frost RN  Outcome: Ongoing

## 2020-09-25 NOTE — PROGRESS NOTES
Progress Note    9/25/2020   10:50 AM    Name:  Silvano Javier  MRN:    [id number]     Acct:     [de-identified]   Room:  [address] [place], [place] [address]  IP Day:   Progress Note    3     Admit Date: 9/22/2020  4:21 PM  PCP: Hina Pineda MD    Subjective:     C/C:   Chief Complaint   Patient presents with    Abdominal Pain    Nausea       Interval History: Status: improved. Patient examined chart reviewed as above gentleman was admitted with free air in his abdomen has had a colostomy and exploratory laparotomy. He is approximately 3 days postop and incrementally improving  Pain is improved. Still has issues with tachycardia seems to have responded to beta-blocker overall improved  Ros  General no weight loss or fever  ent no trouble hearing tasting talking or swallowing  Cardiac history of present illness   respiratory no cough or dyspnea  Gi history of present illness   gu no difficulties urgency hesitancy or dysuria  Ortho no complaints of synovitis or decreased range of motion  Neuro no complaints of gait station or speech  Psych no complaints or nerves or memory  Vascular no claudication or stroke  Endo no  Complaints of dm or thyroid  Heme no complaints of anemia or blood dyscrasias      Medications: Allergies: Allergies   Allergen Reactions    Ampicillin Swelling     Swelling of throat.  Pcn [Penicillins] Swelling     Throat swelling. Tolerated cefepime during 8/31/20 admission.     Tape Aga Mcnally Tape]        Current Meds: bisacodyl (DULCOLAX) suppository 10 mg, Daily PRN  methocarbamol (ROBAXIN) tablet 750 mg, Q6H  bisacodyl (DULCOLAX) suppository 10 mg, Daily  insulin lispro (HUMALOG) injection vial 0-18 Units, Q6H  oxyCODONE (ROXICODONE) immediate release tablet 10 mg, Q4H PRN  metoprolol tartrate (LOPRESSOR) tablet 50 mg, BID  dextrose 5 % and 0.45 % sodium chloride infusion, Continuous  ciprofloxacin (CIPRO) IVPB 400 mg, Q12H  metronidazole (FLAGYL) 500 mg in NaCl 100 mL IVPB premix, Q8H  pantoprazole (PROTONIX) tablet 40 mg, QAM AC  sodium chloride flush 0.9 % injection 10 mL, 2 times per day  sodium chloride flush 0.9 % injection 10 mL, PRN  acetaminophen (TYLENOL) tablet 1,000 mg, 3 times per day  [Held by provider] pravastatin (PRAVACHOL) tablet 20 mg, Daily  naloxone (NARCAN) injection 0.4 mg, PRN  fentaNYL 20 mcg/mL PCA, Continuous  lidocaine 4 % external patch 2 patch, Daily  glucose (GLUTOSE) 40 % oral gel 15 g, PRN  dextrose 50 % IV solution, PRN  glucagon (rDNA) injection 1 mg, PRN  dextrose 5 % solution, PRN  heparin (porcine) injection 5,000 Units, 3 times per day        Data:     Code Status:  Full Code    Family History   Problem Relation Age of Onset    Stroke Maternal Grandmother     Stroke Maternal Grandfather        Social History     Socioeconomic History    Marital status:      Spouse name: Not on file    Number of children: Not on file    Years of education: Not on file    Highest education level: Not on file   Occupational History    Not on file   Social Needs    Financial resource strain: Not on file    Food insecurity     Worry: Not on file     Inability: Not on file    Transportation needs     Medical: Not on file     Non-medical: Not on file   Tobacco Use    Smoking status: Never Smoker    Smokeless tobacco: Never Used   Substance and Sexual Activity    Alcohol use:  Yes     Alcohol/week: 0.0 standard drinks     Comment: once a week    Drug use: No    Sexual activity: Not on file   Lifestyle    Physical activity     Days per week: Not on file     Minutes per session: Not on file    Stress: Not on file   Relationships    Social connections     Talks on phone: Not on file     Gets together: Not on file     Attends [employer] service: Not on file     Active member of club or organization: Not on file     Attends meetings of clubs or organizations: Not on file     Relationship status: Not on file    Intimate partner violence     Fear of current or ex partner: Not on file     Emotionally abused: Not on file     Physically abused: Not on file     Forced sexual activity: Not on file   Other Topics Concern    Not on file   Social History Narrative    Not on file       Vitals:  BP (!) 151/75   Pulse 156   Temp 98.3 °F (36.8 °C) (Oral)   Resp 19   Ht 5' 11\" (1.803 m)   Wt 248 lb 3.8 oz (112.6 kg)   SpO2 96%   BMI 34.62 kg/m²   Temp (24hrs), Av.5 °F (36.9 °C), Min:98.2 °F (36.8 °C), Max:98.7 °F (37.1 °C)    Recent Labs     20  1357 20  1950 20  0406 20  0839   POCGLU 174* 158* 150* 155*       I/O (24Hr): Intake/Output Summary (Last 24 hours) at 2020 1050  Last data filed at 2020 0900  Gross per 24 hour   Intake 3274.31 ml   Output 3205 ml   Net 69.31 ml       Labs:  Daily Labs for 3 Days:    Hematology:  Recent Labs     20  1432 20  0503 20  0424   WBC 21.0* 12.5* 12.3*   HGB 12.0* 7.9* 8.1*   HCT 39.2* 25.5* 26.3*    268 278     Chemistry:  Recent Labs     20  0306  20  0503 20  1122 20  0424      < > 133* 137  --  136   K 4.2   < > 5.2 4.9  --  4.1   CL  --    < > 107 108*  --  106   CO2  --    < > 19* 19*  --  21   GLUCOSE  --    < > 179* 154*  --  165*   BUN  --    < > 16 16  --  14   CREATININE  --    < > 1.60* 1.37*  --  1.46*   MG  --   --   --  1.3*  --  1.9   CALCIUM  --    < > 7.7* 8.0*  --  8.3*   CAION 1.22  --   --   --  1.12*  --    PHOS  --   --   --  2.7  --   --     < > = values in this interval not displayed.      Recent Labs     20  1720 20  1432   PROT 8.4*  --    LABALBU 3.1*  --    AST 32  --    ALT 21  --    ALKPHOS 112  --    BILITOT 0.47  --    LIPASE 253* 198*       paraclinics reviewed as posted  Physical Examination:      General appearance: alert, cooperative and no distress  Mental Status: oriented to person, place and time and normal affect  Lungs: clear to auscultation bilaterally, normal effort  Heart: regular rate and rhythm, no murmur,  Abdomen: soft, tender, nondistended, bowel sounds present all four quadrants, no masses, hepatomegaly or splenomegaly  Extremities: no edema, redness or tenderness in the calves  Skin: no gross lesions, rashes, or induration  ent perrla with eomi conjunctivae pink sclerae clear  Neuro-oriented and alert cn2-12 intact cerebral and cerebellar intact reflexes +2/4 bilaterally  Vascular  Good dp tp carotids  Ortho- no masses or synovitis  Good rom  Lymphatics none palpated in cervical supraclavicular axillary  Or inguinal area    Assessment:        Primary Problem  <principal problem not specified>  Abdominal pain  Status post colostomy  Status post exploratory laparotomy  Diabetes mellitus type 2  There are no active hospital problems to display for this patient. Past Medical History:   Diagnosis Date    Back pain     Cellulitis     Colostomy RUQ 09/23/2020    Gout     H/O left nephrectomy 09/02/2020    HTN     T2DM         Plan:        1. Continue same plan of care  2. Remains in ICU  3. Agree with consultants  4. On beta-blocker for tachycardia  5.  Slowly improving      Electronically signed by Irish Bernabe MD on 9/25/2020 at 10:50 AM

## 2020-09-25 NOTE — PLAN OF CARE
Level of anxiety will decrease  9/25/2020 0054 by Mehreen Lezama RN  Outcome: Ongoing  9/24/2020 1544 by Terrie Ruffin RN  Outcome: Met This Shift     Problem: Aspiration:  Goal: Absence of aspiration  Description: Absence of aspiration  9/25/2020 0054 by Mehreen Lezama RN  Outcome: Ongoing  9/24/2020 1544 by Terrie Ruffin RN  Outcome: Met This Shift     Problem: Fluid Volume - Imbalance:  Goal: Absence of imbalanced fluid volume signs and symptoms  Description: Absence of imbalanced fluid volume signs and symptoms  9/25/2020 0054 by Mehreen Lezama RN  Outcome: Ongoing  9/24/2020 1544 by Terrie Ruffin RN  Outcome: Ongoing     Problem: Sleep Pattern Disturbance:  Goal: Appears well-rested  Description: Appears well-rested  Outcome: Ongoing

## 2020-09-26 LAB
ABSOLUTE EOS #: 0.76 K/UL (ref 0–0.44)
ABSOLUTE IMMATURE GRANULOCYTE: 0.06 K/UL (ref 0–0.3)
ABSOLUTE LYMPH #: 0.97 K/UL (ref 1.1–3.7)
ABSOLUTE MONO #: 0.95 K/UL (ref 0.1–1.2)
ANION GAP SERPL CALCULATED.3IONS-SCNC: 11 MMOL/L (ref 9–17)
BASOPHILS # BLD: 0 % (ref 0–2)
BASOPHILS ABSOLUTE: 0.03 K/UL (ref 0–0.2)
BUN BLDV-MCNC: 13 MG/DL (ref 8–23)
BUN/CREAT BLD: ABNORMAL (ref 9–20)
CALCIUM SERPL-MCNC: 8.3 MG/DL (ref 8.6–10.4)
CHLORIDE BLD-SCNC: 105 MMOL/L (ref 98–107)
CO2: 19 MMOL/L (ref 20–31)
CREAT SERPL-MCNC: 1.36 MG/DL (ref 0.7–1.2)
DIFFERENTIAL TYPE: ABNORMAL
EKG ATRIAL RATE: 105 BPM
EKG ATRIAL RATE: 159 BPM
EKG P AXIS: 32 DEGREES
EKG P-R INTERVAL: 154 MS
EKG Q-T INTERVAL: 308 MS
EKG Q-T INTERVAL: 348 MS
EKG QRS DURATION: 84 MS
EKG QRS DURATION: 84 MS
EKG QTC CALCULATION (BAZETT): 459 MS
EKG QTC CALCULATION (BAZETT): 496 MS
EKG R AXIS: -13 DEGREES
EKG R AXIS: 14 DEGREES
EKG T AXIS: 10 DEGREES
EKG T AXIS: 106 DEGREES
EKG VENTRICULAR RATE: 105 BPM
EKG VENTRICULAR RATE: 156 BPM
EOSINOPHILS RELATIVE PERCENT: 8 % (ref 1–4)
GFR AFRICAN AMERICAN: >60 ML/MIN
GFR NON-AFRICAN AMERICAN: 53 ML/MIN
GFR SERPL CREATININE-BSD FRML MDRD: ABNORMAL ML/MIN/{1.73_M2}
GFR SERPL CREATININE-BSD FRML MDRD: ABNORMAL ML/MIN/{1.73_M2}
GLUCOSE BLD-MCNC: 158 MG/DL (ref 75–110)
GLUCOSE BLD-MCNC: 161 MG/DL (ref 75–110)
GLUCOSE BLD-MCNC: 172 MG/DL (ref 70–99)
GLUCOSE BLD-MCNC: 180 MG/DL (ref 75–110)
HCT VFR BLD CALC: 29.9 % (ref 40.7–50.3)
HEMOGLOBIN: 8.9 G/DL (ref 13–17)
IMMATURE GRANULOCYTES: 1 %
LYMPHOCYTES # BLD: 10 % (ref 24–43)
MCH RBC QN AUTO: 26.6 PG (ref 25.2–33.5)
MCHC RBC AUTO-ENTMCNC: 29.8 G/DL (ref 28.4–34.8)
MCV RBC AUTO: 89.5 FL (ref 82.6–102.9)
MONOCYTES # BLD: 9 % (ref 3–12)
NRBC AUTOMATED: 0 PER 100 WBC
PARTIAL THROMBOPLASTIN TIME: 34.7 SEC (ref 20.5–30.5)
PARTIAL THROMBOPLASTIN TIME: 41.9 SEC (ref 20.5–30.5)
PARTIAL THROMBOPLASTIN TIME: 43.2 SEC (ref 20.5–30.5)
PDW BLD-RTO: 15.4 % (ref 11.8–14.4)
PLATELET # BLD: 310 K/UL (ref 138–453)
PLATELET ESTIMATE: ABNORMAL
PMV BLD AUTO: 10.4 FL (ref 8.1–13.5)
POTASSIUM SERPL-SCNC: 3.8 MMOL/L (ref 3.7–5.3)
RBC # BLD: 3.34 M/UL (ref 4.21–5.77)
RBC # BLD: ABNORMAL 10*6/UL
SEG NEUTROPHILS: 72 % (ref 36–65)
SEGMENTED NEUTROPHILS ABSOLUTE COUNT: 7.29 K/UL (ref 1.5–8.1)
SODIUM BLD-SCNC: 135 MMOL/L (ref 135–144)
WBC # BLD: 10.1 K/UL (ref 3.5–11.3)
WBC # BLD: ABNORMAL 10*3/UL

## 2020-09-26 PROCEDURE — 6360000002 HC RX W HCPCS: Performed by: INTERNAL MEDICINE

## 2020-09-26 PROCEDURE — 6370000000 HC RX 637 (ALT 250 FOR IP): Performed by: INTERNAL MEDICINE

## 2020-09-26 PROCEDURE — 2580000003 HC RX 258: Performed by: STUDENT IN AN ORGANIZED HEALTH CARE EDUCATION/TRAINING PROGRAM

## 2020-09-26 PROCEDURE — 6370000000 HC RX 637 (ALT 250 FOR IP): Performed by: STUDENT IN AN ORGANIZED HEALTH CARE EDUCATION/TRAINING PROGRAM

## 2020-09-26 PROCEDURE — 82947 ASSAY GLUCOSE BLOOD QUANT: CPT

## 2020-09-26 PROCEDURE — 2500000003 HC RX 250 WO HCPCS: Performed by: NURSE PRACTITIONER

## 2020-09-26 PROCEDURE — 6370000000 HC RX 637 (ALT 250 FOR IP): Performed by: NURSE PRACTITIONER

## 2020-09-26 PROCEDURE — 6360000002 HC RX W HCPCS: Performed by: NURSE PRACTITIONER

## 2020-09-26 PROCEDURE — 6370000000 HC RX 637 (ALT 250 FOR IP): Performed by: SURGERY

## 2020-09-26 PROCEDURE — 6360000002 HC RX W HCPCS: Performed by: SURGERY

## 2020-09-26 PROCEDURE — 6360000002 HC RX W HCPCS: Performed by: STUDENT IN AN ORGANIZED HEALTH CARE EDUCATION/TRAINING PROGRAM

## 2020-09-26 PROCEDURE — 85730 THROMBOPLASTIN TIME PARTIAL: CPT

## 2020-09-26 PROCEDURE — 85025 COMPLETE CBC W/AUTO DIFF WBC: CPT

## 2020-09-26 PROCEDURE — 93010 ELECTROCARDIOGRAM REPORT: CPT | Performed by: INTERNAL MEDICINE

## 2020-09-26 PROCEDURE — 2060000000 HC ICU INTERMEDIATE R&B

## 2020-09-26 PROCEDURE — 80048 BASIC METABOLIC PNL TOTAL CA: CPT

## 2020-09-26 PROCEDURE — 2580000003 HC RX 258: Performed by: NURSE PRACTITIONER

## 2020-09-26 PROCEDURE — 2580000003 HC RX 258: Performed by: SURGERY

## 2020-09-26 PROCEDURE — 36415 COLL VENOUS BLD VENIPUNCTURE: CPT

## 2020-09-26 RX ORDER — ONDANSETRON 2 MG/ML
4 INJECTION INTRAMUSCULAR; INTRAVENOUS EVERY 6 HOURS PRN
Status: DISCONTINUED | OUTPATIENT
Start: 2020-09-26 | End: 2020-10-02 | Stop reason: HOSPADM

## 2020-09-26 RX ADMIN — ACETAMINOPHEN 1000 MG: 500 TABLET ORAL at 05:18

## 2020-09-26 RX ADMIN — DEXTROSE AND SODIUM CHLORIDE 100 ML/HR: 5; 450 INJECTION, SOLUTION INTRAVENOUS at 19:56

## 2020-09-26 RX ADMIN — HEPARIN SODIUM 2000 UNITS: 1000 INJECTION INTRAVENOUS; SUBCUTANEOUS at 03:29

## 2020-09-26 RX ADMIN — SODIUM CHLORIDE, PRESERVATIVE FREE 10 ML: 5 INJECTION INTRAVENOUS at 20:01

## 2020-09-26 RX ADMIN — ONDANSETRON 4 MG: 2 INJECTION INTRAMUSCULAR; INTRAVENOUS at 11:43

## 2020-09-26 RX ADMIN — CIPROFLOXACIN 400 MG: 2 INJECTION, SOLUTION INTRAVENOUS at 07:31

## 2020-09-26 RX ADMIN — HYDROMORPHONE HYDROCHLORIDE 0.5 MG: 1 INJECTION, SOLUTION INTRAMUSCULAR; INTRAVENOUS; SUBCUTANEOUS at 07:28

## 2020-09-26 RX ADMIN — METOPROLOL TARTRATE 50 MG: 50 TABLET, FILM COATED ORAL at 07:31

## 2020-09-26 RX ADMIN — BISACODYL 10 MG: 10 SUPPOSITORY RECTAL at 07:31

## 2020-09-26 RX ADMIN — METRONIDAZOLE 500 MG: 500 INJECTION, SOLUTION INTRAVENOUS at 11:30

## 2020-09-26 RX ADMIN — SODIUM CHLORIDE 200 MG: 9 INJECTION, SOLUTION INTRAVENOUS at 08:39

## 2020-09-26 RX ADMIN — DILTIAZEM HYDROCHLORIDE 30 MG: 30 TABLET, FILM COATED ORAL at 10:18

## 2020-09-26 RX ADMIN — HYDROMORPHONE HYDROCHLORIDE 0.5 MG: 1 INJECTION, SOLUTION INTRAMUSCULAR; INTRAVENOUS; SUBCUTANEOUS at 11:43

## 2020-09-26 RX ADMIN — METOPROLOL TARTRATE 50 MG: 50 TABLET, FILM COATED ORAL at 01:15

## 2020-09-26 RX ADMIN — METHOCARBAMOL TABLETS 750 MG: 750 TABLET, COATED ORAL at 06:57

## 2020-09-26 RX ADMIN — HYDROMORPHONE HYDROCHLORIDE 0.5 MG: 1 INJECTION, SOLUTION INTRAMUSCULAR; INTRAVENOUS; SUBCUTANEOUS at 06:17

## 2020-09-26 RX ADMIN — HEPARIN SODIUM AND DEXTROSE 16.9 UNITS/KG/HR: 10000; 5 INJECTION INTRAVENOUS at 23:25

## 2020-09-26 RX ADMIN — HYDROMORPHONE HYDROCHLORIDE 0.5 MG: 1 INJECTION, SOLUTION INTRAMUSCULAR; INTRAVENOUS; SUBCUTANEOUS at 10:18

## 2020-09-26 RX ADMIN — METHOCARBAMOL TABLETS 750 MG: 750 TABLET, COATED ORAL at 01:15

## 2020-09-26 RX ADMIN — METHOCARBAMOL TABLETS 750 MG: 750 TABLET, COATED ORAL at 13:56

## 2020-09-26 RX ADMIN — METHOCARBAMOL TABLETS 750 MG: 750 TABLET, COATED ORAL at 18:52

## 2020-09-26 RX ADMIN — HYDROMORPHONE HYDROCHLORIDE 0.5 MG: 1 INJECTION, SOLUTION INTRAMUSCULAR; INTRAVENOUS; SUBCUTANEOUS at 13:51

## 2020-09-26 RX ADMIN — ONDANSETRON 4 MG: 2 INJECTION INTRAMUSCULAR; INTRAVENOUS at 18:15

## 2020-09-26 RX ADMIN — METRONIDAZOLE 500 MG: 500 INJECTION, SOLUTION INTRAVENOUS at 03:06

## 2020-09-26 RX ADMIN — OXYCODONE HYDROCHLORIDE 10 MG: 5 TABLET ORAL at 05:18

## 2020-09-26 RX ADMIN — OXYCODONE HYDROCHLORIDE 10 MG: 5 TABLET ORAL at 18:15

## 2020-09-26 RX ADMIN — BISACODYL 10 MG: 10 SUPPOSITORY RECTAL at 05:58

## 2020-09-26 RX ADMIN — HYDROMORPHONE HYDROCHLORIDE 0.5 MG: 1 INJECTION, SOLUTION INTRAMUSCULAR; INTRAVENOUS; SUBCUTANEOUS at 08:39

## 2020-09-26 RX ADMIN — OXYCODONE HYDROCHLORIDE 10 MG: 5 TABLET ORAL at 11:43

## 2020-09-26 RX ADMIN — HYDROMORPHONE HYDROCHLORIDE 0.5 MG: 1 INJECTION, SOLUTION INTRAMUSCULAR; INTRAVENOUS; SUBCUTANEOUS at 15:44

## 2020-09-26 RX ADMIN — HYDROMORPHONE HYDROCHLORIDE 0.5 MG: 1 INJECTION, SOLUTION INTRAMUSCULAR; INTRAVENOUS; SUBCUTANEOUS at 02:17

## 2020-09-26 RX ADMIN — DILTIAZEM HYDROCHLORIDE 30 MG: 30 TABLET, FILM COATED ORAL at 20:01

## 2020-09-26 RX ADMIN — METRONIDAZOLE 500 MG: 500 INJECTION, SOLUTION INTRAVENOUS at 18:52

## 2020-09-26 RX ADMIN — OXYCODONE HYDROCHLORIDE 10 MG: 5 TABLET ORAL at 01:15

## 2020-09-26 RX ADMIN — CIPROFLOXACIN 400 MG: 2 INJECTION, SOLUTION INTRAVENOUS at 20:00

## 2020-09-26 RX ADMIN — PANTOPRAZOLE SODIUM 40 MG: 40 TABLET, DELAYED RELEASE ORAL at 05:18

## 2020-09-26 RX ADMIN — METOPROLOL TARTRATE 50 MG: 50 TABLET, FILM COATED ORAL at 20:00

## 2020-09-26 RX ADMIN — METOPROLOL TARTRATE 50 MG: 50 TABLET, FILM COATED ORAL at 13:56

## 2020-09-26 RX ADMIN — HEPARIN SODIUM AND DEXTROSE 12.9 UNITS/KG/HR: 10000; 5 INJECTION INTRAVENOUS at 08:41

## 2020-09-26 RX ADMIN — INSULIN LISPRO 3 UNITS: 100 INJECTION, SOLUTION INTRAVENOUS; SUBCUTANEOUS at 16:52

## 2020-09-26 RX ADMIN — ACETAMINOPHEN 1000 MG: 500 TABLET ORAL at 13:55

## 2020-09-26 RX ADMIN — INSULIN LISPRO 3 UNITS: 100 INJECTION, SOLUTION INTRAVENOUS; SUBCUTANEOUS at 20:04

## 2020-09-26 RX ADMIN — DEXTROSE AND SODIUM CHLORIDE: 5; 450 INJECTION, SOLUTION INTRAVENOUS at 03:06

## 2020-09-26 RX ADMIN — HEPARIN SODIUM 2000 UNITS: 1000 INJECTION INTRAVENOUS; SUBCUTANEOUS at 20:04

## 2020-09-26 RX ADMIN — ACETAMINOPHEN 1000 MG: 500 TABLET ORAL at 22:05

## 2020-09-26 ASSESSMENT — PAIN DESCRIPTION - FREQUENCY
FREQUENCY: CONTINUOUS

## 2020-09-26 ASSESSMENT — PAIN SCALES - GENERAL
PAINLEVEL_OUTOF10: 9
PAINLEVEL_OUTOF10: 8
PAINLEVEL_OUTOF10: 10
PAINLEVEL_OUTOF10: 8
PAINLEVEL_OUTOF10: 10
PAINLEVEL_OUTOF10: 8
PAINLEVEL_OUTOF10: 7
PAINLEVEL_OUTOF10: 8
PAINLEVEL_OUTOF10: 9
PAINLEVEL_OUTOF10: 5
PAINLEVEL_OUTOF10: 6
PAINLEVEL_OUTOF10: 9
PAINLEVEL_OUTOF10: 8
PAINLEVEL_OUTOF10: 10
PAINLEVEL_OUTOF10: 9

## 2020-09-26 ASSESSMENT — PAIN DESCRIPTION - LOCATION
LOCATION: ABDOMEN

## 2020-09-26 ASSESSMENT — PAIN DESCRIPTION - ONSET
ONSET: ON-GOING

## 2020-09-26 ASSESSMENT — PAIN DESCRIPTION - PROGRESSION
CLINICAL_PROGRESSION: NOT CHANGED

## 2020-09-26 ASSESSMENT — PAIN DESCRIPTION - PAIN TYPE
TYPE: SURGICAL PAIN

## 2020-09-26 ASSESSMENT — PAIN DESCRIPTION - DESCRIPTORS
DESCRIPTORS: CRAMPING
DESCRIPTORS: ACHING
DESCRIPTORS: SHARP
DESCRIPTORS: ACHING
DESCRIPTORS: PRESSURE

## 2020-09-26 ASSESSMENT — PAIN DESCRIPTION - ORIENTATION
ORIENTATION: LOWER

## 2020-09-26 NOTE — PROGRESS NOTES
[facility]   Progress Note                   Date:   9/26/2020  Patient name: Regino Jaffe II  Date of admission:  9/22/2020  4:21 PM  MRN:   [id number]  YOB: 1960  PCP: Mimi Thompson MD    Reason for Admission:      Subjective: There were no acute events overnight, remained hemodynamically stable, denies chest pain, dyspnea, orthopnea or palpitations. Continued to be tachycardic, Hr <110  TSH normal  Some nausea and diffuse abd tenderness    Brief history  Patient 75-year-old male presented with abdominal pain, found to have intraperitoneal free air on CT. Taken for ex lap, transverse loop colostomy and LIOR drain placement done. Postop patient had significant tachycardia, -180s. EKG showed concern for atrial fibrillation, cardiology consulted. 2 doses Lopressor 5 IV given, patient converted, appeared sinus tachycardia, HR in 120s.     Medications:   Scheduled Meds:   iron sucrose  200 mg Intravenous Once    metoprolol tartrate  50 mg Oral Q6H    methocarbamol  750 mg Oral Q6H    bisacodyl  10 mg Rectal Daily    insulin lispro  0-18 Units Subcutaneous Q6H    ciprofloxacin  400 mg Intravenous Q12H    metroNIDAZOLE  500 mg Intravenous Q8H    pantoprazole  40 mg Oral QAM AC    sodium chloride flush  10 mL Intravenous 2 times per day    acetaminophen  1,000 mg Oral 3 times per day    lidocaine  2 patch Transdermal Daily       Continuous Infusions:   heparin (PORCINE) Infusion 12.9 Units/kg/hr (09/26/20 0329)    dextrose 5 % and 0.45 % NaCl 100 mL/hr at 09/26/20 0306    dextrose         CBC:   Recent Labs     09/25/20  0424 09/25/20  1339 09/26/20  0446   WBC 12.3* 12.8* 10.1   HGB 8.1* 9.1* 8.9*    317 310     BMP:    Recent Labs     09/24/20  0503 09/25/20  0424 09/26/20  0446    136 135   K 4.9 4.1 3.8   * 106 105   CO2 19* 21 19*   BUN 16 14 13   CREATININE 1.37* 1.46* 1.36*   GLUCOSE 154* 165* 172*     Hepatic: No results for input(s): AST, ALT, ALB, BILITOT, ALKPHOS in the last 72 hours. Troponin: No results for input(s): TROPONINI in the last 72 hours. BNP: No results for input(s): BNP in the last 72 hours. Lipids: No results for input(s): CHOL, HDL in the last 72 hours. Invalid input(s): LDLCALCU  INR: No results for input(s): INR in the last 72 hours. Objective:   Vitals: BP (!) 149/85   Pulse 109   Temp 98.2 °F (36.8 °C) (Oral)   Resp 17   Ht 5' 11\" (1.803 m)   Wt 257 lb 0.9 oz (116.6 kg)   SpO2 96%   BMI 35.85 kg/m²     General appearance: awake, alert, in no apparent respiratory distress   HEENT: Head: Normocephalic, no lesions, without obvious abnormality  Neck: no JVD  Lungs: clear to auscultation bilaterally, no basilar rales, no wheezing   Heart: regular rate and rhythm, S1, S2 normal, no murmur, click, rub or gallop  Abdomen: soft, non-tender; bowel sounds normal  Extremities: No LE edema  Neurologic: Mental status: Alert, oriented. Motor and sensory not done. EKG:   Sinus tachycardia with Premature supraventricular complexes  Minimal voltage criteria for LVH, may be normal variant  Borderline ECG    Echocardiogram:  Left ventricle is normal in size. Mild left ventricular hypertrophy. Global left ventricular systolic function is normal with an estimated  ejection fraction of 60 % . Due to the technical limitations of this study not all wall segments were  visualized. No significant valvular regurgitation or stenosis seen. Large pleural effusion. Coronary Angiography:         Assessment / Acute Cardiac Problems:   1.      Patient Active Problem List:     Cellulitis     Type 2 diabetes mellitus without complication ([address]     Essential hypertension     Morbid obesity due to excess calories (HCC)     Chronic kidney disease (CKD)     DEBBI (acute kidney injury) ([address]     Bowel obstruction (HCC)     Small bowel obstruction (HCC)     Severe malnutrition (HCC)     Hyperglycemia     Renal cell cancer [facility])    New onset paroxysmal atrial fibrillation with RVR: Currently controlled, sinus tach, likely from other causes  Abdominal free air: Status post ex lap, transverse loop colostomy, LIOR drain  Recent L nephrectomy and ex lap with resection of descending colon  DEBBI  DM2    Plan of Treatment:   1. Continue Lopressor 50 mg p.o. every 6, IV Lopressor as needed  2. If again goes into atrial fibrillation, start amiodarone bolus and drip  3. Heparin drip for now, then start eliquis 5 mg po bid when okay with surgery team  4. Reviewed recent Echo    Discussed with patient and nursing. Jaswinder Meraz MD  Resident, [address]    Pager - [phone]      Attending Cardiologist Addendum: I have reviewed and performed the history, physical, subjective, objective, assessment, and plan with the resident/fellow and agree with the note. I performed the history and physical personally. I have made changes to the note above as needed. Still has some sinus tachy  - add cardizem 30 mg po bid  - continue BB  Parox AF- now in NSR  - heparin drip  - plan for eliquis 5 mg po bid when okay with surgery    Dispo: will follow peripherally, please call with questions, follow up in 2 weeks post discharge. Thank you for allowing me to participate in the care of this patient, please do not hesitate to call if you have any questions. Tod Davila DO, [facility], [address] [facility]  [facility] [employer]  [phone]

## 2020-09-26 NOTE — PROGRESS NOTES
Progress Note    9/26/2020   4:07 PM    Name:  Anamaria Dong  MRN:    [id number]     Acct:     [de-identified]   Room:  [address] [place] [address] Day:   Progress Note    4     Admit Date: 9/22/2020  4:21 PM  PCP: Matt Price MD    Subjective:     C/C:   Chief Complaint   Patient presents with    Abdominal Pain    Nausea       Interval History: Status: improved. Pt examined chart reviewed as above    admitted with bowel leak and is now status post colostomy   no bm yet but some gas  He has no appetite  Slowly improving  Ros  General no weight loss or fever  ent no trouble hearing tasting talking or swallowing  Cardiac no chest pain or dyspnea  Respiratory no cough or dyspnea  Gi [place]  gu naki on cki  Ortho no complaints of synovitis or decreased range of motion  Neuro no complaints of gait station or speech  Psych no complaints or nerves or memory  Vascular no claudication or stroke  Endo no  Complaints of dm or thyroid  Heme no complaints of anemia or blood dyscrasias      Medications: Allergies: Allergies   Allergen Reactions    Ampicillin Swelling     Swelling of throat.  Pcn [Penicillins] Swelling     Throat swelling. Tolerated cefepime during 8/31/20 admission.     Tape Ali Stu Tape]        Current Meds: ondansetron (ZOFRAN) injection 4 mg, Q6H PRN  HYDROmorphone (DILAUDID) injection 0.5 mg, Q1H PRN  dilTIAZem (CARDIZEM) tablet 30 mg, 2 times per day  bisacodyl (DULCOLAX) suppository 10 mg, Daily PRN  metoprolol tartrate (LOPRESSOR) tablet 50 mg, Q6H  heparin (porcine) injection 4,000 Units, PRN  heparin (porcine) injection 2,000 Units, PRN  heparin 25,000 units in dextrose 5% 250 mL infusion, Continuous  metoprolol (LOPRESSOR) injection 5 mg, Q6H PRN  methocarbamol (ROBAXIN) tablet 750 mg, Q6H  bisacodyl (DULCOLAX) suppository 10 mg, Daily  insulin lispro (HUMALOG) injection vial 0-18 Units, Q6H  oxyCODONE (ROXICODONE) immediate release tablet 10 mg, Q4H PRN  dextrose 5 % and 0.45 % sodium chloride infusion, Continuous  ciprofloxacin (CIPRO) IVPB 400 mg, Q12H  metronidazole (FLAGYL) 500 mg in NaCl 100 mL IVPB premix, Q8H  pantoprazole (PROTONIX) tablet 40 mg, QAM AC  sodium chloride flush 0.9 % injection 10 mL, 2 times per day  sodium chloride flush 0.9 % injection 10 mL, PRN  acetaminophen (TYLENOL) tablet 1,000 mg, 3 times per day  lidocaine 4 % external patch 2 patch, Daily  glucose (GLUTOSE) 40 % oral gel 15 g, PRN  dextrose 50 % IV solution, PRN  glucagon (rDNA) injection 1 mg, PRN  dextrose 5 % solution, PRN        Data:     Code Status:  Full Code    Family History   Problem Relation Age of Onset    Stroke Maternal Grandmother     Stroke Maternal Grandfather        Social History     Socioeconomic History    Marital status:      Spouse name: Not on file    Number of children: Not on file    Years of education: Not on file    Highest education level: Not on file   Occupational History    Not on file   Social Needs    Financial resource strain: Not on file    Food insecurity     Worry: Not on file     Inability: Not on file    Transportation needs     Medical: Not on file     Non-medical: Not on file   Tobacco Use    Smoking status: Never Smoker    Smokeless tobacco: Never Used   Substance and Sexual Activity    Alcohol use:  Yes     Alcohol/week: 0.0 standard drinks     Comment: once a week    Drug use: No    Sexual activity: Not on file   Lifestyle    Physical activity     Days per week: Not on file     Minutes per session: Not on file    Stress: Not on file   Relationships    Social connections     Talks on phone: Not on file     Gets together: Not on file     Attends [employer] service: Not on file     Active member of club or organization: Not on file     Attends meetings of clubs or organizations: Not on file     Relationship status: Not on file    Intimate partner violence     Fear of current or ex partner: Not on file     Emotionally abused: Not on file     Physically abused: Not on file     Forced sexual activity: Not on file   Other Topics Concern    Not on file   Social History Narrative    Not on file       Vitals:  BP (!) 138/94   Pulse 101   Temp 98.6 °F (37 °C) (Oral)   Resp 14   Ht 5' 11\" (1.803 m)   Wt 257 lb 0.9 oz (116.6 kg)   SpO2 94%   BMI 35.85 kg/m²   Temp (24hrs), Av.4 °F (36.9 °C), Min:98.1 °F (36.7 °C), Max:98.9 °F (37.2 °C)    Recent Labs     20  0839 20  1217 20  1957 20  0125   POCGLU 155* 127* 161* 158*       I/O (24Hr):     Intake/Output Summary (Last 24 hours) at 2020 1607  Last data filed at 2020 0737  Gross per 24 hour   Intake 2601.09 ml   Output 2070 ml   Net 531.09 ml       Labs:  Daily Labs for 3 Days:    Hematology:  Recent Labs     20  0424 20  1339 20  0446   WBC 12.3* 12.8* 10.1   HGB 8.1* 9.1* 8.9*   HCT 26.3* 31.1* 29.9*    317 310     Chemistry:  Recent Labs     20  0503 20  1122 20  0424 20  0446     --  136 135   K 4.9  --  4.1 3.8   *  --  106 105   CO2 19*  --  21 19*   GLUCOSE 154*  --  165* 172*   BUN 16  --  14 13   CREATININE 1.37*  --  1.46* 1.36*   MG 1.3*  --  1.9  --    CALCIUM 8.0*  --  8.3* 8.3*   CAION  --  1.12*  --   --    PHOS 2.7  --   --   --      Recent Labs     20  1339   TSH 1.60       paraclinics reviewed as posted  Physical Examination:      General appearance: alert, cooperative and no distress weak bt interactive  Mental Status: oriented to person, place and time and normal affect  Lungs: clear to auscultation bilaterally, normal effort  Heart: regular rate and rhythm, no murmur,  Abdomen: soft, tender, nondistended, bowel sounds present all four quadrants, no masses, hepatomegaly or splenomegaly  Extremities: no edema, redness or tenderness in the calves  Skin: no gross lesions, rashes, or induration  ent perrla with eomi conjunctivae pink sclerae clear  Neuro-oriented and alert cn2-12 intact cerebral and cerebellar intact reflexes +2/4 bilaterally  Vascular  Good dp tp carotids  Ortho- no masses or synovitis  Good rom  Lymphatics none palpated in cervical supraclavicular axillary  Or inguinal area    Assessment:        Primary Problem  <principal problem not specified>   pneumoperitoneum  dm2  Status post nephrectomy  There are no active hospital problems to display for this patient. Past Medical History:   Diagnosis Date    Back pain     Cellulitis     Colostomy RUQ 09/23/2020    Gout     H/O left nephrectomy 09/02/2020    HTN     T2DM         Plan:        1. Cont same plan of care  2. iv fluids heparin antibiotics  3.  Remains npo       Electronically signed by Leo Burns MD on 9/26/2020 at 4:07 PM

## 2020-09-26 NOTE — PROGRESS NOTES
General Surgery:  Daily Progress Note          POD # 3 s/p exploratory laparotomy, transverse loop colostomy and LIOR drain placement          PATIENT NAME: Franklin Barroso II     TODAY'S DATE: 9/26/2020, 6:19 AM  CC: Abdominal pain    SUBJECTIVE:     Pt seen and examined at bedside. No acute events overnight. Patient continued to be tachycardic low teens continued on Lopressor. Patient's pain is about the same. Colostomy little/no output. States he was up in chair for 6hrs yesterday and used his IS. Pt admits to some nausea this am, no emesis, denies f/c.     LIOR 10ml serous  Tmax 37.2    OBJECTIVE:   VITALS:  /79   Pulse 113   Temp 98.2 °F (36.8 °C) (Oral)   Resp 16   Ht 5' 11\" (1.803 m)   Wt 248 lb 3.8 oz (112.6 kg)   SpO2 96%   BMI 34.62 kg/m²      INTAKE/OUTPUT:      Intake/Output Summary (Last 24 hours) at 9/26/2020 6723  Last data filed at 9/26/2020 0400  Gross per 24 hour   Intake 2601.09 ml   Output 2495 ml   Net 106.09 ml       PHYSICAL EXAM:  General Appearance: awake, alert, oriented, in no acute distress  HEENT:  Normocephalic, atraumatic, mucus membranes moist   Heart: Heart sounds are normal.  Regular rate and rhythm without murmur, gallop or rub. Lungs: Normal expansion. Clear to auscultation. No rales, rhonchi, or wheezing. Abdomen: Obese, soft, moderate distention, diffuse TTP throughout. LIOR drain right lower quadrant with minimal serosanguineous fluid. Gastrostomy tube in place, Midline surgical incision with packing in place removed this am, no gross drainage   Extremities: No cyanosis, pitting edema, rashes noted. Skin: Skin color, texture, turgor normal. No rashes or lesions.     Data:  CBC with Differential:    Lab Results   Component Value Date    WBC 10.1 09/26/2020    RBC 3.34 09/26/2020    HGB 8.9 09/26/2020    HCT 29.9 09/26/2020     09/26/2020    MCV 89.5 09/26/2020    MCH 26.6 09/26/2020    MCHC 29.8 09/26/2020    RDW 15.4 09/26/2020    LYMPHOPCT 10 09/26/2020    MONOPCT 9 09/26/2020    BASOPCT 0 09/26/2020    MONOSABS 0.95 09/26/2020    LYMPHSABS 0.97 09/26/2020    EOSABS 0.76 09/26/2020    BASOSABS 0.03 09/26/2020    DIFFTYPE NOT REPORTED 09/26/2020     BMP:    Lab Results   Component Value Date     09/26/2020    K 3.8 09/26/2020     09/26/2020    CO2 19 09/26/2020    BUN 13 09/26/2020    LABALBU 3.1 09/22/2020    CREATININE 1.36 09/26/2020    CALCIUM 8.3 09/26/2020    GFRAA >60 09/26/2020    LABGLOM 53 09/26/2020    GLUCOSE 172 09/26/2020     ASSESSMENT:    56 y.o. male is POD# 3 s/p exploratory laparotomy, transverse loop colostomy and LIOR drain placement      Plan:  1. Neuro- Analgesia: prn dilaudid and Roxicodone, wean from IV as able  2. GI- rec to cont Gastrostomy tube to gravity, monitor ostomy output, Diet: NPO, sips with meds, abd binder when up out of bed  3. Cardiac- Tachycardia: Sinus tachycardia, on Lopressor 50 mg Q4H with HR in 110s to 120s. 4. Renal- DEBBI: Creatinine down trending,  UOP adequate, cont strict I/Os  5. Activity:  Continue to encourage ambulation/activity w/ PT/OT; Continue to encourage incentive spirometry and deep breathing   6. Wound care: daily dressing change with midline  dry fluffs/ABD overtop as needed  7.  Continue/appreaciate medical management per primary/CC    Thank you,    Electronically signed by Elroy Short DO  on 9/26/2020 at 6:19 AM

## 2020-09-26 NOTE — PROGRESS NOTES
Pt refused turns throughout shift d/t pain. Writer explained importance of turning to prevent skin breakdown. Pt wants to wait until pain is more controlled.

## 2020-09-26 NOTE — PROGRESS NOTES
fluids  NUTRITION: NPO - OK for ice chips  ANTIBIOTICS: cipro/flagyl  GI: pepcid  DVT: heparin TID  GLYCEMIC CONTROL: good - HDSSI  HOB >45: yes    SUBJECTIVE    Digna Gleason II was seen and examined this AM. States he had a lot of abdominal pain since yesterday afternoon. Felt better after he voided. Pain still in the left flank going across his abdomen. Tachycardic but in 100s when sleeping.      OBJECTIVE  VITALS: Temp: Temp: 98.2 °F (36.8 °C)Temp  Av.3 °F (36.8 °C)  Min: 98.1 °F (36.7 °C)  Max: 98.9 °F (99.8 °C) BP Systolic (46TWU), INV:193 , Min:104 , HLK:527   Diastolic (69QEL), NEX:47, Min:74, Max:92   Pulse Pulse  Av.5  Min: 103  Max: 156 Resp Resp  Av.9  Min: 6  Max: 25 Pulse ox SpO2  Av.5 %  Min: 95 %  Max: 98 %    GENERAL: alert, no distress  NEURO: CNII-XII grossly intact; moving extremities equally  HEENT: EOMI  LUNGS: symmetric rise and fall of chest wall; normal effort  HEART: regular rhythm and tachycardic  ABDOMEN: softly distended, tender to palpation diffusely; LIOR drain with SS fluid, ostomy pink and healthy - without stool  EXTREMITY: no cyanosis, clubbing or edema    Drain/tube output:  20cc SS from LIOR drain    LAB:  CBC:   Recent Labs     20  0424 20  1339 20  0446   WBC 12.3* 12.8* 10.1   HGB 8.1* 9.1* 8.9*   HCT 26.3* 31.1* 29.9*   MCV 88.3 93.7 89.5    317 310     BMP:   Recent Labs     20  0503 20  0424 20  0446    136 135   K 4.9 4.1 3.8   * 106 105   CO2 19* 21 19*   BUN 16 14 13   CREATININE 1.37* 1.46* 1.36*   GLUCOSE 154* 165* 172*         RADIOLOGY:  No new imaging to review    Nabila Claudio DO  Trauma Resident  2020 7:04 AM

## 2020-09-26 NOTE — PLAN OF CARE
Problem: Pain:  Goal: Pain level will decrease  Description: Pain level will decrease  9/26/2020 1033 by Lia Jeong RN  Outcome: Ongoing  9/26/2020 0354 by Guillermo Garrido RN  Outcome: Ongoing  Goal: Control of acute pain  Description: Control of acute pain  Outcome: Ongoing  Goal: Control of chronic pain  Description: Control of chronic pain  Outcome: Ongoing     Problem: Falls - Risk of:  Goal: Will remain free from falls  Description: Will remain free from falls  Outcome: Ongoing  Goal: Absence of physical injury  Description: Absence of physical injury  Outcome: Ongoing     Problem: Skin Integrity:  Goal: Will show no infection signs and symptoms  Description: Will show no infection signs and symptoms  Outcome: Ongoing  Goal: Absence of new skin breakdown  Description: Absence of new skin breakdown  Outcome: Ongoing     Problem: Discharge Planning:  Goal: Participates in care planning  Description: Participates in care planning  Outcome: Ongoing  Goal: Discharged to appropriate level of care  Description: Discharged to appropriate level of care  Outcome: Ongoing     Problem: Anxiety/Stress:  Goal: Level of anxiety will decrease  Description: Level of anxiety will decrease  Outcome: Ongoing     Problem: Aspiration:  Goal: Absence of aspiration  Description: Absence of aspiration  Outcome: Ongoing     Problem: Fluid Volume - Imbalance:  Goal: Absence of imbalanced fluid volume signs and symptoms  Description: Absence of imbalanced fluid volume signs and symptoms  9/26/2020 1033 by Lia Jeong RN  Outcome: Ongoing  9/26/2020 0354 by Guillermo Garrido RN  Outcome: Ongoing     Problem: Sleep Pattern Disturbance:  Goal: Appears well-rested  Description: Appears well-rested  9/26/2020 1033 by Lia Jenog RN  Outcome: Ongoing  9/26/2020 0354 by Guillermo Garrido RN  Outcome: Ongoing

## 2020-09-26 NOTE — PLAN OF CARE
Problem: Pain:  Goal: Pain level will decrease  Description: Pain level will decrease  Outcome: Ongoing     Problem: Fluid Volume - Imbalance:  Goal: Absence of imbalanced fluid volume signs and symptoms  Description: Absence of imbalanced fluid volume signs and symptoms  Outcome: Ongoing     Problem: Sleep Pattern Disturbance:  Goal: Appears well-rested  Description: Appears well-rested  Outcome: Ongoing

## 2020-09-26 NOTE — PLAN OF CARE
Problem: Pain:  Goal: Pain level will decrease  Description: Pain level will decrease  9/26/2020 1833 by Clemente Hodges RN  Outcome: Ongoing  9/26/2020 1033 by Jamshid Shetty RN  Outcome: Ongoing  Goal: Control of acute pain  Description: Control of acute pain  9/26/2020 1833 by Clemente Hodges RN  Outcome: Ongoing  9/26/2020 1033 by Jamshid Shetty RN  Outcome: Ongoing  Goal: Control of chronic pain  Description: Control of chronic pain  9/26/2020 1833 by Clemente Hodges RN  Outcome: Ongoing  9/26/2020 1033 by Jamshid Shetty RN  Outcome: Ongoing

## 2020-09-27 LAB
-: NORMAL
ANION GAP SERPL CALCULATED.3IONS-SCNC: 10 MMOL/L (ref 9–17)
BUN BLDV-MCNC: 11 MG/DL (ref 8–23)
BUN/CREAT BLD: ABNORMAL (ref 9–20)
CALCIUM SERPL-MCNC: 8.3 MG/DL (ref 8.6–10.4)
CHLORIDE BLD-SCNC: 108 MMOL/L (ref 98–107)
CO2: 19 MMOL/L (ref 20–31)
CREAT SERPL-MCNC: 1.29 MG/DL (ref 0.7–1.2)
GFR AFRICAN AMERICAN: >60 ML/MIN
GFR NON-AFRICAN AMERICAN: 56 ML/MIN
GFR SERPL CREATININE-BSD FRML MDRD: ABNORMAL ML/MIN/{1.73_M2}
GFR SERPL CREATININE-BSD FRML MDRD: ABNORMAL ML/MIN/{1.73_M2}
GLUCOSE BLD-MCNC: 156 MG/DL (ref 75–110)
GLUCOSE BLD-MCNC: 161 MG/DL (ref 75–110)
GLUCOSE BLD-MCNC: 174 MG/DL (ref 75–110)
GLUCOSE BLD-MCNC: 176 MG/DL (ref 75–110)
GLUCOSE BLD-MCNC: 179 MG/DL (ref 70–99)
HCT VFR BLD CALC: 29.3 % (ref 40.7–50.3)
HEMOGLOBIN: 9.1 G/DL (ref 13–17)
MCH RBC QN AUTO: 28.9 PG (ref 25.2–33.5)
MCHC RBC AUTO-ENTMCNC: 31.1 G/DL (ref 28.4–34.8)
MCV RBC AUTO: 93 FL (ref 82.6–102.9)
NRBC AUTOMATED: 0 PER 100 WBC
PARTIAL THROMBOPLASTIN TIME: 50.5 SEC (ref 20.5–30.5)
PARTIAL THROMBOPLASTIN TIME: 54.1 SEC (ref 20.5–30.5)
PDW BLD-RTO: 15.9 % (ref 11.8–14.4)
PLATELET # BLD: 492 K/UL (ref 138–453)
PMV BLD AUTO: 10.9 FL (ref 8.1–13.5)
POTASSIUM SERPL-SCNC: 3.4 MMOL/L (ref 3.7–5.3)
RBC # BLD: 3.15 M/UL (ref 4.21–5.77)
REASON FOR REJECTION: NORMAL
SODIUM BLD-SCNC: 137 MMOL/L (ref 135–144)
WBC # BLD: 7.6 K/UL (ref 3.5–11.3)
ZZ NTE CLEAN UP: ORDERED TEST: NORMAL
ZZ NTE WITH NAME CLEAN UP: SPECIMEN SOURCE: NORMAL

## 2020-09-27 PROCEDURE — 85730 THROMBOPLASTIN TIME PARTIAL: CPT

## 2020-09-27 PROCEDURE — 6370000000 HC RX 637 (ALT 250 FOR IP): Performed by: STUDENT IN AN ORGANIZED HEALTH CARE EDUCATION/TRAINING PROGRAM

## 2020-09-27 PROCEDURE — 2580000003 HC RX 258: Performed by: NURSE PRACTITIONER

## 2020-09-27 PROCEDURE — 6370000000 HC RX 637 (ALT 250 FOR IP): Performed by: SURGERY

## 2020-09-27 PROCEDURE — 6370000000 HC RX 637 (ALT 250 FOR IP): Performed by: NURSE PRACTITIONER

## 2020-09-27 PROCEDURE — 82947 ASSAY GLUCOSE BLOOD QUANT: CPT

## 2020-09-27 PROCEDURE — 2500000003 HC RX 250 WO HCPCS: Performed by: NURSE PRACTITIONER

## 2020-09-27 PROCEDURE — 6360000002 HC RX W HCPCS: Performed by: STUDENT IN AN ORGANIZED HEALTH CARE EDUCATION/TRAINING PROGRAM

## 2020-09-27 PROCEDURE — 2060000000 HC ICU INTERMEDIATE R&B

## 2020-09-27 PROCEDURE — 6360000002 HC RX W HCPCS: Performed by: NURSE PRACTITIONER

## 2020-09-27 PROCEDURE — 6370000000 HC RX 637 (ALT 250 FOR IP): Performed by: INTERNAL MEDICINE

## 2020-09-27 PROCEDURE — 6360000002 HC RX W HCPCS: Performed by: INTERNAL MEDICINE

## 2020-09-27 PROCEDURE — 85027 COMPLETE CBC AUTOMATED: CPT

## 2020-09-27 PROCEDURE — 2580000003 HC RX 258: Performed by: STUDENT IN AN ORGANIZED HEALTH CARE EDUCATION/TRAINING PROGRAM

## 2020-09-27 PROCEDURE — 80048 BASIC METABOLIC PNL TOTAL CA: CPT

## 2020-09-27 PROCEDURE — 36415 COLL VENOUS BLD VENIPUNCTURE: CPT

## 2020-09-27 PROCEDURE — 99254 IP/OBS CNSLTJ NEW/EST MOD 60: CPT | Performed by: INTERNAL MEDICINE

## 2020-09-27 RX ORDER — BISACODYL 10 MG
10 SUPPOSITORY, RECTAL RECTAL DAILY
Status: DISCONTINUED | OUTPATIENT
Start: 2020-09-27 | End: 2020-09-28

## 2020-09-27 RX ORDER — DILTIAZEM HYDROCHLORIDE 60 MG/1
60 TABLET, FILM COATED ORAL EVERY 12 HOURS SCHEDULED
Status: DISCONTINUED | OUTPATIENT
Start: 2020-09-27 | End: 2020-10-02 | Stop reason: HOSPADM

## 2020-09-27 RX ORDER — METOPROLOL TARTRATE 50 MG/1
100 TABLET, FILM COATED ORAL 2 TIMES DAILY
Status: DISCONTINUED | OUTPATIENT
Start: 2020-09-27 | End: 2020-10-02 | Stop reason: HOSPADM

## 2020-09-27 RX ADMIN — BISACODYL 10 MG: 10 SUPPOSITORY RECTAL at 09:08

## 2020-09-27 RX ADMIN — HYDROMORPHONE HYDROCHLORIDE 0.5 MG: 1 INJECTION, SOLUTION INTRAMUSCULAR; INTRAVENOUS; SUBCUTANEOUS at 14:21

## 2020-09-27 RX ADMIN — DEXTROSE AND SODIUM CHLORIDE 1000 ML: 5; 450 INJECTION, SOLUTION INTRAVENOUS at 12:43

## 2020-09-27 RX ADMIN — DILTIAZEM HYDROCHLORIDE 60 MG: 60 TABLET, FILM COATED ORAL at 09:08

## 2020-09-27 RX ADMIN — METOPROLOL TARTRATE 100 MG: 50 TABLET, FILM COATED ORAL at 20:05

## 2020-09-27 RX ADMIN — ACETAMINOPHEN 1000 MG: 500 TABLET ORAL at 13:35

## 2020-09-27 RX ADMIN — METHOCARBAMOL TABLETS 750 MG: 750 TABLET, COATED ORAL at 13:35

## 2020-09-27 RX ADMIN — SODIUM CHLORIDE, PRESERVATIVE FREE 10 ML: 5 INJECTION INTRAVENOUS at 09:09

## 2020-09-27 RX ADMIN — HYDROMORPHONE HYDROCHLORIDE 0.5 MG: 1 INJECTION, SOLUTION INTRAMUSCULAR; INTRAVENOUS; SUBCUTANEOUS at 16:25

## 2020-09-27 RX ADMIN — CIPROFLOXACIN 400 MG: 2 INJECTION, SOLUTION INTRAVENOUS at 09:04

## 2020-09-27 RX ADMIN — PANTOPRAZOLE SODIUM 40 MG: 40 TABLET, DELAYED RELEASE ORAL at 05:07

## 2020-09-27 RX ADMIN — OXYCODONE HYDROCHLORIDE 10 MG: 5 TABLET ORAL at 10:21

## 2020-09-27 RX ADMIN — ACETAMINOPHEN 1000 MG: 500 TABLET ORAL at 05:07

## 2020-09-27 RX ADMIN — METHOCARBAMOL TABLETS 750 MG: 750 TABLET, COATED ORAL at 06:31

## 2020-09-27 RX ADMIN — INSULIN LISPRO 3 UNITS: 100 INJECTION, SOLUTION INTRAVENOUS; SUBCUTANEOUS at 16:17

## 2020-09-27 RX ADMIN — ONDANSETRON 4 MG: 2 INJECTION INTRAMUSCULAR; INTRAVENOUS at 10:21

## 2020-09-27 RX ADMIN — METHOCARBAMOL TABLETS 750 MG: 750 TABLET, COATED ORAL at 20:06

## 2020-09-27 RX ADMIN — HYDROMORPHONE HYDROCHLORIDE 0.5 MG: 1 INJECTION, SOLUTION INTRAMUSCULAR; INTRAVENOUS; SUBCUTANEOUS at 05:07

## 2020-09-27 RX ADMIN — INSULIN LISPRO 3 UNITS: 100 INJECTION, SOLUTION INTRAVENOUS; SUBCUTANEOUS at 20:37

## 2020-09-27 RX ADMIN — HEPARIN SODIUM AND DEXTROSE 16.9 UNITS/KG/HR: 10000; 5 INJECTION INTRAVENOUS at 13:37

## 2020-09-27 RX ADMIN — HYDROMORPHONE HYDROCHLORIDE 0.5 MG: 1 INJECTION, SOLUTION INTRAMUSCULAR; INTRAVENOUS; SUBCUTANEOUS at 12:38

## 2020-09-27 RX ADMIN — ACETAMINOPHEN 1000 MG: 500 TABLET ORAL at 20:36

## 2020-09-27 RX ADMIN — METRONIDAZOLE 500 MG: 500 INJECTION, SOLUTION INTRAVENOUS at 02:18

## 2020-09-27 RX ADMIN — DILTIAZEM HYDROCHLORIDE 60 MG: 60 TABLET, FILM COATED ORAL at 20:36

## 2020-09-27 RX ADMIN — INSULIN LISPRO 3 UNITS: 100 INJECTION, SOLUTION INTRAVENOUS; SUBCUTANEOUS at 02:21

## 2020-09-27 RX ADMIN — INSULIN LISPRO 3 UNITS: 100 INJECTION, SOLUTION INTRAVENOUS; SUBCUTANEOUS at 09:14

## 2020-09-27 RX ADMIN — METHOCARBAMOL TABLETS 750 MG: 750 TABLET, COATED ORAL at 02:16

## 2020-09-27 RX ADMIN — HYDROMORPHONE HYDROCHLORIDE 0.5 MG: 1 INJECTION, SOLUTION INTRAMUSCULAR; INTRAVENOUS; SUBCUTANEOUS at 01:36

## 2020-09-27 RX ADMIN — OXYCODONE HYDROCHLORIDE 10 MG: 5 TABLET ORAL at 17:28

## 2020-09-27 RX ADMIN — METOPROLOL TARTRATE 100 MG: 50 TABLET, FILM COATED ORAL at 09:08

## 2020-09-27 RX ADMIN — METOPROLOL TARTRATE 50 MG: 50 TABLET, FILM COATED ORAL at 02:17

## 2020-09-27 RX ADMIN — HYDROMORPHONE HYDROCHLORIDE 0.5 MG: 1 INJECTION, SOLUTION INTRAMUSCULAR; INTRAVENOUS; SUBCUTANEOUS at 09:08

## 2020-09-27 RX ADMIN — ONDANSETRON 4 MG: 2 INJECTION INTRAMUSCULAR; INTRAVENOUS at 17:28

## 2020-09-27 RX ADMIN — SODIUM CHLORIDE, PRESERVATIVE FREE 10 ML: 5 INJECTION INTRAVENOUS at 20:05

## 2020-09-27 RX ADMIN — HYDROMORPHONE HYDROCHLORIDE 0.5 MG: 1 INJECTION, SOLUTION INTRAMUSCULAR; INTRAVENOUS; SUBCUTANEOUS at 07:37

## 2020-09-27 ASSESSMENT — PAIN SCALES - GENERAL
PAINLEVEL_OUTOF10: 7
PAINLEVEL_OUTOF10: 7
PAINLEVEL_OUTOF10: 8
PAINLEVEL_OUTOF10: 7
PAINLEVEL_OUTOF10: 0
PAINLEVEL_OUTOF10: 7
PAINLEVEL_OUTOF10: 8
PAINLEVEL_OUTOF10: 7
PAINLEVEL_OUTOF10: 7
PAINLEVEL_OUTOF10: 8

## 2020-09-27 ASSESSMENT — PAIN DESCRIPTION - LOCATION
LOCATION: ABDOMEN

## 2020-09-27 ASSESSMENT — PAIN DESCRIPTION - PAIN TYPE
TYPE: SURGICAL PAIN

## 2020-09-27 NOTE — PROGRESS NOTES
General Surgery:  Daily Progress Note          POD # 4 s/p exploratory laparotomy, transverse loop colostomy and LIOR drain placement           PATIENT NAME: Meaghan Ames II     TODAY'S DATE: 9/27/2020, 6:42 AM  CC: Abdominal pain    SUBJECTIVE:     Pt seen and examined at bedside. No acute events overnight. Patient continues to be tachycardic in the low teens continue Lopressor. Patient reports pain levels about the same well-controlled with current pain management. Colostomy level to no output but passing gas. LIOR drain about 10 mL serous fluid. patient continues to sit in the chair for 45 hours daily. Denies fever, chills, nausea, vomiting. Tmax 37. OBJECTIVE:   VITALS:  /84   Pulse 97   Temp 97.9 °F (36.6 °C) (Tympanic)   Resp 15   Ht 5' 11\" (1.803 m)   Wt 299 lb 9.7 oz (135.9 kg)   SpO2 94%   BMI 41.79 kg/m²      INTAKE/OUTPUT:      Intake/Output Summary (Last 24 hours) at 9/27/2020 3555  Last data filed at 9/27/2020 0450  Gross per 24 hour   Intake 2160.84 ml   Output 1625 ml   Net 535.84 ml       PHYSICAL EXAM:  General Appearance: awake, alert, oriented, in no acute distress  HEENT:  Normocephalic, atraumatic, mucus membranes moist   Heart: Heart sounds are normal.  Regular rate and rhythm without murmur, gallop or rub. Lungs: Normal expansion. Clear to auscultation. No rales, rhonchi, or wheezing. Abdomen: Obese, soft, moderate distention, diffuse TTP throughout.  LIOR drain right lower quadrant with minimal serosanguineous fluid. Gastrostomy tube in place, Midline surgical incision, no gross drainage   Extremities: No cyanosis, pitting edema, rashes noted. Skin: Skin color, texture, turgor normal. No rashes or lesions.     Data:  CBC with Differential:    Lab Results   Component Value Date    WBC 10.1 09/26/2020    RBC 3.34 09/26/2020    HGB 8.9 09/26/2020    HCT 29.9 09/26/2020     09/26/2020    MCV 89.5 09/26/2020    MCH 26.6 09/26/2020    MCHC 29.8 09/26/2020    RDW 15.4 09/26/2020    LYMPHOPCT 10 09/26/2020    MONOPCT 9 09/26/2020    BASOPCT 0 09/26/2020    MONOSABS 0.95 09/26/2020    LYMPHSABS 0.97 09/26/2020    EOSABS 0.76 09/26/2020    BASOSABS 0.03 09/26/2020    DIFFTYPE NOT REPORTED 09/26/2020     BMP:    Lab Results   Component Value Date     09/26/2020    K 3.8 09/26/2020     09/26/2020    CO2 19 09/26/2020    BUN 13 09/26/2020    LABALBU 3.1 09/22/2020    CREATININE 1.36 09/26/2020    CALCIUM 8.3 09/26/2020    GFRAA >60 09/26/2020    LABGLOM 53 09/26/2020    GLUCOSE 172 09/26/2020     PT/INR:  No results found for: PROTIME, INR  PTT:    Lab Results   Component Value Date    APTT 54.1 09/27/2020   [APTT}      ASSESSMENT:      56 y.o. male with is POD# 4 s/p exploratory laparotomy, transverse loop colostomy and LIOR drain placement    Plan:  1. Neuro- Analgesia: prn dilaudid and Roxicodone, wean from IV as able  2. GI- rec to cont Gastrostomy tube to gravity, monitor ostomy output, Diet: NPO, sips with meds, abd binder when up out of bed  3. Cardiac- Tachycardia: Sinus tachycardia, on Lopressor 50 mg Q4H with HR in 110s to 120s.     4. Renal- DEBBI: Creatinine down trending,  UOP adequate, cont strict I/Os  5. Activity:  Continue to encourage ambulation/activity w/ PT/OT; Continue to encourage incentive spirometry and deep breathing   6. Wound care: daily dressing change with midline  dry fluffs/ABD overtop as needed  7.  Continue/appreaciate medical management per primary/CC      Electronically signed by Elvis Michelle MD  on 9/27/2020 at 6:42 AM

## 2020-09-27 NOTE — CONSULTS
Nephrology Consult Note    Reason for Consult:  DEBBI on CKD  Requesting Physician:  Dr. Sena Lockett    Chief Complaint:  Admitted 9/22 with abdominal pain, tachycardia, n/v    History Obtained From:  Patient, EMR, RN    History of Present Illness: This is a 56 y.o. male who presented to the hospital for evaluation of significant abdominal pain, tachycardia and n/v on 9/22/20. His recent PMH has been complex, he has a history of HTN, DM, LISA, . Patient on 9/2/20 underwent L nephrectomy, (left kidney with xanthogranulomatous pyelonephritis), with exploratory laparotomy and resection of the descending colon with primary anastomosis and additionally a resection of the proximal jejunum with primary anastomosis. Prior to the above he underwent right ureteral stent placement with Dr Ruiz Laureano on 9/1/20 and was transferred to [facility] for further management. Patient was discharged on 9/11/20. He returned to the ED 9/22 due to left flank pain that radiated towards his abdomen. Patient had minimal p.o. intake as well as nausea and vomiting. CT abdomen pelvis showed contrast extravasation the colonic anastomosis and therefore the patient was taken to the operating room.     Patient was extubated and was brought to the ICU postoperatively due to persistent tachycardic. Given a liter bolus of NS this AM due to tachycardia - up to 150s. Additionally given lopressor with minimal improvement with HR 130s. Surgical critical care was therefore consulted for management while in the ICU on 9/23/20. He was also followed by cardiology for new onset a-fib with RVR-exacerbated by abdominal issues, which improved to sinus tachycardia. Recent Echo showed a preserved LVEF. Lab review suggests his baseline Cr likely at 1.28-1.4. June 2020. Upon review of Cr from 8/31/20 till now has increased up to a high of 2.20 on 9/16, up from 9/11 at time of that d/c at 1.64. Cr has trended down this admission to 1.29. He has D 5 1/2 NS at 100cc/hr infusing and is on a heparin gtt as well. U/O has been good. He is + 6.7 liters from this admission. He has never seen a nephrologist.     Patient admits to hx of daily Motrin use for 5-6 years, but quit taking it about 2 years ago. Admits to hx of relative frequent kidney stones. There is no hx of jaundice or hepatitis or sexually transmitted disease. Pt has no hx of collagen vascular disease or vasculitis. At home for the past 8-10 years he would take lasix as needed for swelling in his legs/feet. He states on average he would use it 5 times per month. EKG:   Sinus tachycardia with Premature supraventricular complexes  Minimal voltage criteria for LVH, may be normal variant  Borderline ECG     Echocardiogram:  Left ventricle is normal in size. Mild left ventricular hypertrophy. Global left ventricular systolic function is normal with an estimated  ejection fraction of 60 % . Due to the technical limitations of this study not all wall segments were  visualized. No significant valvular regurgitation or stenosis seen. Large pleural effusion.             Past Medical History:        Diagnosis Date    Back pain     Cellulitis     Colostomy RUQ 09/23/2020    Gout     H/O left nephrectomy 09/02/2020    HTN     T2DM        Past Surgical History:        Procedure Laterality Date    ABDOMEN SURGERY  09/23/2020    exploratory laparotomy/colostomy creation    ABDOMINAL EXPLORATION SURGERY  09/02/2020    EXPLORATORY LAPAROTOMY BOWEL RESECTION     ANKLE SURGERY      CARPAL TUNNEL RELEASE Bilateral     CYSTOSCOPY Right 9/1/2020    CYSTOSCOPY RIGHT URETERAL STENT INSERTION performed by Corwin Kent MD at [facility] CATH POWER PICC TRIPLE  9/3/2020         KIDNEY SURGERY Left 9/2/2020    LAPAROSCOPIC XI ROBOTIC NEPHRECTOMY performed by Satya Funes MD at [facility] Bilateral     LAPAROTOMY N/A 9/23/2020    LAPAROTOMY EXPLORATORY performed by Yves Skiff, DO at [address] N/A 9/2/2020    EXPLORATORY LAPAROTOMY, RESECTION OF PROXIMAL JEJUNUM AND DESCENDING COLON WITH MOBILIZATION OF SPLENIC FLEXURE AND PRIMARY ANASTAMOSISOF SMALL BOWEL AND COLON, PLACEMENT OF GASTROSTOMY TUBE performed by Yves Skiff, DO at [address] 09/02/2020    LAPAROSCOPIC XI ROBOTIC NEPHRECTOMY        Current Medications:    bisacodyl (DULCOLAX) suppository 10 mg, Daily  dilTIAZem (CARDIZEM) tablet 60 mg, 2 times per day  metoprolol tartrate (LOPRESSOR) tablet 100 mg, BID  ondansetron (ZOFRAN) injection 4 mg, Q6H PRN  HYDROmorphone (DILAUDID) injection 0.5 mg, Q1H PRN  heparin (porcine) injection 4,000 Units, PRN  heparin (porcine) injection 2,000 Units, PRN  heparin 25,000 units in dextrose 5% 250 mL infusion, Continuous  metoprolol (LOPRESSOR) injection 5 mg, Q6H PRN  methocarbamol (ROBAXIN) tablet 750 mg, Q6H  bisacodyl (DULCOLAX) suppository 10 mg, Daily  insulin lispro (HUMALOG) injection vial 0-18 Units, Q6H  oxyCODONE (ROXICODONE) immediate release tablet 10 mg, Q4H PRN  dextrose 5 % and 0.45 % sodium chloride infusion, Continuous  pantoprazole (PROTONIX) tablet 40 mg, QAM AC  sodium chloride flush 0.9 % injection 10 mL, 2 times per day  sodium chloride flush 0.9 % injection 10 mL, PRN  acetaminophen (TYLENOL) tablet 1,000 mg, 3 times per day  lidocaine 4 % external patch 2 patch, Daily  glucose (GLUTOSE) 40 % oral gel 15 g, PRN  dextrose 50 % IV solution, PRN  glucagon (rDNA) injection 1 mg, PRN  dextrose 5 % solution, PRN        Allergies:  Ampicillin; Pcn [penicillins]; and Tape Bhavik Bach tape]    Social History:   Social History     Socioeconomic History    Marital status:      Spouse name: Not on file    Number of children: Not on file    Years of education: Not on file    Highest education level: Not on file   Occupational History    Not on file   Social Needs    Financial resource strain: Not on file    Food insecurity     Worry: Not on file     Inability: Not on file    Transportation needs     Medical: Not on file     Non-medical: Not on file   Tobacco Use    Smoking status: Never Smoker    Smokeless tobacco: Never Used   Substance and Sexual Activity    Alcohol use: Yes     Alcohol/week: 0.0 standard drinks     Comment: once a week    Drug use: No    Sexual activity: Not on file   Lifestyle    Physical activity     Days per week: Not on file     Minutes per session: Not on file    Stress: Not on file   Relationships    Social connections     Talks on phone: Not on file     Gets together: Not on file     Attends Spiritism service: Not on file     Active member of club or organization: Not on file     Attends meetings of clubs or organizations: Not on file     Relationship status: Not on file    Intimate partner violence     Fear of current or ex partner: Not on file     Emotionally abused: Not on file     Physically abused: Not on file     Forced sexual activity: Not on file   Other Topics Concern    Not on file   Social History Narrative    Not on file       Family History:   Family History   Problem Relation Age of Onset    Stroke Maternal Grandmother     Stroke Maternal Grandfather        Review of Systems:    Constitutional: No fever, no chills, no lethargy, no weakness. HEENT:  No headache, otalgia, itchy eyes, nasal discharge or sore throat. Cardiac:  No chest pain, dyspnea, orthopnea or PND. Chest:              No cough, phlegm or wheezing. Abdomen:  + abdominal pain, +nausea +vomiting (at admission)  Neuro:  No focal weakness, abnormal movements orseizure like activity. Skin:   No rashes, no itching. :   No hematuria, no pyuria, no dysuria, no flank pain. Extremities:  No swelling or joint pains.  Admits to intermittent swelling of legs at home - chronically      Objective:  CURRENT TEMPERATURE:  Temp: 98.3 °F (36.8 °C)  MAXIMUM TEMPERATURE OVER 24HRS:  Temp (24hrs), Av.2 °F (36.8 °C), Min:97.7 °F (36.5 °C), Max:98.6 °F (37 °C)    CURRENT RESPIRATORY RATE:  Resp: 14  CURRENT PULSE:  Pulse: 90  CURRENT BLOOD PRESSURE:  BP: 125/88  24HR BLOOD PRESSURE RANGE:  Systolic (19LSR), EHW:303 , Min:117 , OHV:522   ; Diastolic (40JIO), JSF:75, Min:83, Max:104    24HR INTAKE/OUTPUT:      Intake/Output Summary (Last 24 hours) at 2020 1337  Last data filed at 2020 1247  Gross per 24 hour   Intake 2937.84 ml   Output 1825 ml   Net 1112.84 ml     Patient Vitals for the past 96 hrs (Last 3 readings):   Weight   20 0445 299 lb 9.7 oz (135.9 kg)   20 0430 299 lb 9.7 oz (135.9 kg)   20 0600 257 lb 0.9 oz (116.6 kg)     Physical Exam:  General appearance:Awake, alert, in no acute distress  Skin: warm and dry, no rash or erythema  Eyes: conjunctivae normal and sclera anicteric  ENT: :no thrush no pharyngeal congestion    Neck: no carotid bruit ,no  JVD,no carotid Lymphadenopathy, noThyromegaly Pulmonary: no wheezing or rhonchi. No rales heard. Cardiovascular: Normal S1 & S2,  No S3 or  S4, no Pericardial Rub no Murmur   Abdomen: softly distended, obese, diffusely ttp, LIOR drain RLQ, ML surgical incision covered CDI, gastrostomy tube in place ostomy to RLQ  Extremities:no cyanosis, clubbing, no pitting edema    Labs:   CBC:  Recent Labs     20  1339 20  0446 20  0645   WBC 12.8* 10.1 7.6   RBC 3.32* 3.34* 3.15*   HGB 9.1* 8.9* 9.1*   HCT 31.1* 29.9* 29.3*   MCV 93.7 89.5 93.0   MCH 27.4 26.6 28.9   MCHC 29.3 29.8 31.1   RDW 15.6* 15.4* 15.9*    310 492*   MPV 10.4 10.4 10.9      BMP:   Recent Labs     20  1034    135 137   K 4.1 3.8 3.4*    105 108*   CO2 21 19* 19*   BUN 14 13 11   CREATININE 1.46* 1.36* 1.29*   GLUCOSE 165* 172* 179*   CALCIUM 8.3* 8.3* 8.3*        Phosphorus:  No results for input(s): PHOS in the last 72 hours.   Magnesium:   Recent Labs     20   MG 1.9 Albumin: No results for input(s): LABALBU in the last 72 hours. IRON:    Lab Results   Component Value Date    IRON 20 09/05/2020     Iron Saturation:  No components found for: PERCENTFE  TIBC:    Lab Results   Component Value Date    TIBC 139 09/05/2020     FERRITIN:    Lab Results   Component Value Date    FERRITIN 1,431 09/05/2020     SPEP:   Lab Results   Component Value Date    PROT 8.4 09/22/2020     UPEP: No results found for: TPU     C3: No results found for: C3  C4: No results found for: C4  MPO ANCA:  No results found for: MPO .   PR3 ANCA:  No results found for: PR3  Urine Sodium:    Lab Results   Component Value Date    TRISH 64 12/11/2016      Urine Creatinine:    Lab Results   Component Value Date    LABCREA 121.6 06/22/2020     Urine Eosinophils: No results found for: UREO  Urine Protein:  No results found for: TPU  Urinalysis:  U/A:   Lab Results   Component Value Date    NITRU NEGATIVE 09/22/2020    COLORU YELLOW 09/22/2020    PHUR 5.0 09/22/2020    WBCUA 10 TO 20 09/22/2020    RBCUA 2 TO 5 09/22/2020    MUCUS NOT REPORTED 09/22/2020    TRICHOMONAS NOT REPORTED 09/22/2020    YEAST NOT REPORTED 09/22/2020    BACTERIA NOT REPORTED 09/22/2020    SPECGRAV 1.009 09/22/2020    LEUKOCYTESUR MODERATE 09/22/2020    UROBILINOGEN Normal 09/22/2020    BILIRUBINUR NEGATIVE 09/22/2020    GLUCOSEU NEGATIVE 09/22/2020    KETUA NEGATIVE 09/22/2020    AMORPHOUS NOT REPORTED 09/22/2020         Radiology: 9/16/20  EXAMINATION:    RETROPERITONEAL ULTRASOUND OF THE KIDNEYS AND URINARY BLADDER         9/16/2020         COMPARISON:    CT abdomen and pelvis September 7, 2020         HISTORY:    ORDERING SYSTEM PROVIDED HISTORY: DEBBI, solitary right kidney, known right    ureteral stent    TECHNOLOGIST PROVIDED HISTORY:    DEBBI, solitary right kidney, known right ureteral stent         FINDINGS:         Kidneys:         The right kidney measures 11.7 cm in length and the left kidney is not    visualized.         Kidney demonstrate normal cortical echogenicity.  No evidence of    hydronephrosis or intrarenal stones.         Right upper quadrant ascites.              Bladder:         Unremarkable appearance of the bladder.  No significant post void residual.    Stent noted in the bladder.              Impression    Left kidney not visualized.  Stent noted in the bladder. Assessment:  1. Suspect he has CKD stage 3 BL 1.3-1.4 from longstanding DM2, HTN, acute insults from ATN over past few weeks secondary to hemodynamic instability, surgery, left nephrectomy, GI losses, right distal ureteral stone s/p cystoscopy and right ureteral stent placement. 2. Longstanding DM2  3. Longstanding HTN  4. Recent right distal ureteral stone s/p cysto and right ureteral stent placement by Dr. Leeann Soria 9/1/20.  5. Recent a-fib with RVR now sinus tachycardia, preserved LVEF on recent echo. 6.  POD4 s/p exploratory laparotomy, transverse loop colostomy and LIOR drain placement. Plan:  1. Continue D5 1/2 NS at 100 cc/hr. 2. Urine protein to creatinine ratio  3. Will Order serum free light chain ratio  to evaluate for occult paraprotein disease. 4. Will order FARIBA and complement levels as urine sediment does not show significant RBCs. 5. Avoid nephrotoxins, continue strict I/O, daily weights. 6. Daily BMP  7. Following     Thank you for the consultation. Please do not hesitate to call with questions. Will discuss with Dr. Bella Prieto. Electronically signed by MIGUELANGEL Mejía on 9/27/2020 at 1:37 PM     Attending Physician Statement  I have discussed the care of Regina Mahoney II, including pertinent history and exam findings with the APRN. I have reviewed the key elements of all parts of the encounter with the APRN. I have seen and examined the patient with the APRN. I agree with the assessment and plan and status of the problem list as documented.      Wilber Hernandez MD  Nephrology Attending Physician  [facility]

## 2020-09-27 NOTE — PLAN OF CARE
Problem: Pain:  Goal: Pain level will decrease  Description: Pain level will decrease  9/27/2020 0223 by Hu Abarca RN  Outcome: Ongoing     Problem: Pain:  Goal: Control of acute pain  Description: Control of acute pain  9/27/2020 0223 by Hu Abarca RN  Outcome: Ongoing     Problem: Pain:  Goal: Control of chronic pain  Description: Control of chronic pain  9/27/2020 0223 by Hu Abarca RN  Outcome: Ongoing     Problem: Bowel/Gastric:  Goal: Control of bowel function will improve  Description: Control of bowel function will improve  Outcome: Ongoing     Problem:  Bowel/Gastric:  Goal: Ability to achieve a regular elimination pattern will improve  Description: Ability to achieve a regular elimination pattern will improve  Outcome: Ongoing   Electronically signed by Hu Abarca RN on 9/27/2020 at 2:24 AM

## 2020-09-27 NOTE — PROGRESS NOTES
[facility]   Progress Note                   Date:   9/27/2020  Patient name: Chacorta Ragland II  Date of admission:  9/22/2020  4:21 PM  MRN:   [id number]  YOB: 1960  PCP: Matt Price MD    Reason for Admission:      Subjective:       HR . No cp, sob, orthopnea, pnd, le edema    Brief history  Patient 26-year-old male presented with abdominal pain, found to have intraperitoneal free air on CT. Taken for ex lap, transverse loop colostomy and LIOR drain placement done. Postop patient had significant tachycardia, -180s. EKG showed concern for atrial fibrillation, cardiology consulted. 2 doses Lopressor 5 IV given, patient converted, appeared sinus tachycardia, HR in 120s. Medications:   Scheduled Meds:   bisacodyl  10 mg Rectal Daily    dilTIAZem  30 mg Oral 2 times per day    metoprolol tartrate  50 mg Oral Q6H    methocarbamol  750 mg Oral Q6H    bisacodyl  10 mg Rectal Daily    insulin lispro  0-18 Units Subcutaneous Q6H    ciprofloxacin  400 mg Intravenous Q12H    pantoprazole  40 mg Oral QAM AC    sodium chloride flush  10 mL Intravenous 2 times per day    acetaminophen  1,000 mg Oral 3 times per day    lidocaine  2 patch Transdermal Daily       Continuous Infusions:   heparin (PORCINE) Infusion 16.9 Units/kg/hr (09/27/20 0347)    dextrose 5 % and 0.45 % NaCl 100 mL/hr (09/26/20 1956)    dextrose         CBC:   Recent Labs     09/25/20  1339 09/26/20  0446 09/27/20  0645   WBC 12.8* 10.1 7.6   HGB 9.1* 8.9* 9.1*    310 492*     BMP:    Recent Labs     09/25/20  0424 09/26/20  0446    135   K 4.1 3.8    105   CO2 21 19*   BUN 14 13   CREATININE 1.46* 1.36*   GLUCOSE 165* 172*     Hepatic: No results for input(s): AST, ALT, ALB, BILITOT, ALKPHOS in the last 72 hours. Troponin: No results for input(s): TROPONINI in the last 72 hours. BNP: No results for input(s): BNP in the last 72 hours.   Lipids: No results for input(s): CHOL, HDL in the last 72 hours. Invalid input(s): LDLCALCU  INR: No results for input(s): INR in the last 72 hours. Objective:   Vitals: /84   Pulse 97   Temp 97.9 °F (36.6 °C) (Tympanic)   Resp 15   Ht 5' 11\" (1.803 m)   Wt 299 lb 9.7 oz (135.9 kg)   SpO2 94%   BMI 41.79 kg/m²     General appearance: awake, alert, in no apparent respiratory distress   HEENT: Head: Normocephalic, no lesions, without obvious abnormality  Neck: no JVD  Lungs: clear to auscultation bilaterally, no basilar rales, no wheezing   Heart: regular rate and rhythm, S1, S2 normal, no murmur, click, rub or gallop  Abdomen: soft, non-tender; bowel sounds normal  Extremities: No LE edema  Neurologic: Mental status: Alert, oriented. Motor and sensory not done. EKG:   Sinus tachycardia with Premature supraventricular complexes  Minimal voltage criteria for LVH, may be normal variant  Borderline ECG    Echocardiogram:  Left ventricle is normal in size. Mild left ventricular hypertrophy. Global left ventricular systolic function is normal with an estimated  ejection fraction of 60 % . Due to the technical limitations of this study not all wall segments were  visualized. No significant valvular regurgitation or stenosis seen. Large pleural effusion. Coronary Angiography:         Assessment / Acute Cardiac Problems:     New onset paroxysmal atrial fibrillation with RVR: Currently in sinus tach, likely from other causes  Abdominal free air: Status post ex lap, transverse loop colostomy, LIOR drain  Recent L nephrectomy and ex lap with resection of descending colon  DEBBI  DM2    Plan of Treatment:   1. Change to lopressor 100 bid  2. Increase cardizem 60 bid  3. Heparin drip for now, then start eliquis 5 mg po bid when okay with surgery team  4. Reviewed recent Echo    Will follow from a far, please start eliquis prior to d/c. Follow up with cardiology in 2 weeks. Discussed with patient and nursing.      Thank you for allowing me to participate in the care of this patient, please do not hesitate to call if you have any questions. Dorie Contreras DO, [facility]  [facility]. [facility]  [phone]

## 2020-09-27 NOTE — PROGRESS NOTES
Progress Note    9/27/2020   1:04 PM    Name:  Shweta Pate  MRN:    [id number]     Acct:     [de-identified]   Room:  [address] [place] [address] Day:   Progress Note    5     Admit Date: 9/22/2020  4:21 PM  PCP: Vlad Love MD    Subjective:     C/C:   Chief Complaint   Patient presents with    Abdominal Pain    Nausea       Interval History: Status: improved. Pt examined chart reviewed as above   Admitted with anastamotic leak leading to pneumoperitoneum and subsequent laparotomy and colostomy  Out of icu for several days    colostomy starting to function  He remains npo  Cr is up a bit and will request nephrology consult   Discussed with nephrology  Ros  General no weight loss or fever  ent no trouble hearing tasting talking or swallowing  Cardiac [place]  Respiratory no cough or dyspnea  Gihopi  guhopi  Ortho no complaints of synovitis or decreased range of motion  Neuro no complaints of gait station or speech  Psych no complaints or nerves or memory  Vascular no claudication or stroke  Endo dm 2  Heme no complaints of anemia or blood dyscrasias      Medications: Allergies: Allergies   Allergen Reactions    Ampicillin Swelling     Swelling of throat.  Pcn [Penicillins] Swelling     Throat swelling. Tolerated cefepime during 8/31/20 admission.     Tape Bimal Ny Tape]        Current Meds: bisacodyl (DULCOLAX) suppository 10 mg, Daily  dilTIAZem (CARDIZEM) tablet 60 mg, 2 times per day  metoprolol tartrate (LOPRESSOR) tablet 100 mg, BID  ondansetron (ZOFRAN) injection 4 mg, Q6H PRN  HYDROmorphone (DILAUDID) injection 0.5 mg, Q1H PRN  heparin (porcine) injection 4,000 Units, PRN  heparin (porcine) injection 2,000 Units, PRN  heparin 25,000 units in dextrose 5% 250 mL infusion, Continuous  metoprolol (LOPRESSOR) injection 5 mg, Q6H PRN  methocarbamol (ROBAXIN) tablet 750 mg, Q6H  bisacodyl (DULCOLAX) suppository 10 mg, Daily  insulin lispro (HUMALOG) injection vial 0-18 Units, Q6H  oxyCODONE (ROXICODONE) immediate release tablet 10 mg, Q4H PRN  dextrose 5 % and 0.45 % sodium chloride infusion, Continuous  pantoprazole (PROTONIX) tablet 40 mg, QAM AC  sodium chloride flush 0.9 % injection 10 mL, 2 times per day  sodium chloride flush 0.9 % injection 10 mL, PRN  acetaminophen (TYLENOL) tablet 1,000 mg, 3 times per day  lidocaine 4 % external patch 2 patch, Daily  glucose (GLUTOSE) 40 % oral gel 15 g, PRN  dextrose 50 % IV solution, PRN  glucagon (rDNA) injection 1 mg, PRN  dextrose 5 % solution, PRN        Data:     Code Status:  Full Code    Family History   Problem Relation Age of Onset    Stroke Maternal Grandmother     Stroke Maternal Grandfather        Social History     Socioeconomic History    Marital status:      Spouse name: Not on file    Number of children: Not on file    Years of education: Not on file    Highest education level: Not on file   Occupational History    Not on file   Social Needs    Financial resource strain: Not on file    Food insecurity     Worry: Not on file     Inability: Not on file    Transportation needs     Medical: Not on file     Non-medical: Not on file   Tobacco Use    Smoking status: Never Smoker    Smokeless tobacco: Never Used   Substance and Sexual Activity    Alcohol use:  Yes     Alcohol/week: 0.0 standard drinks     Comment: once a week    Drug use: No    Sexual activity: Not on file   Lifestyle    Physical activity     Days per week: Not on file     Minutes per session: Not on file    Stress: Not on file   Relationships    Social connections     Talks on phone: Not on file     Gets together: Not on file     Attends Christian service: Not on file     Active member of club or organization: Not on file     Attends meetings of clubs or organizations: Not on file     Relationship status: Not on file    Intimate partner violence     Fear of current or ex partner: Not on file     Emotionally abused: Not on file     Physically abused: Not on file Forced sexual activity: Not on file   Other Topics Concern    Not on file   Social History Narrative    Not on file       Vitals:  /88   Pulse 90   Temp 98.3 °F (36.8 °C) (Oral)   Resp 14   Ht 5' 11\" (1.803 m)   Wt 299 lb 9.7 oz (135.9 kg)   SpO2 96%   BMI 41.79 kg/m²   Temp (24hrs), Av.2 °F (36.8 °C), Min:97.7 °F (36.5 °C), Max:98.6 °F (37 °C)    Recent Labs     20  1635 20  2031 20  0632 20  1213   POCGLU 161* 180* 161* 156*       I/O (24Hr):     Intake/Output Summary (Last 24 hours) at 2020 1304  Last data filed at 2020 1247  Gross per 24 hour   Intake 2937.84 ml   Output 1825 ml   Net 1112.84 ml       Labs:  Daily Labs for 3 Days:    Hematology:  Recent Labs     20  1339 20  0446 20  0645   WBC 12.8* 10.1 7.6   HGB 9.1* 8.9* 9.1*   HCT 31.1* 29.9* 29.3*    310 492*     Chemistry:  Recent Labs     20  0424 20  0446 20  1034    135 137   K 4.1 3.8 3.4*    105 108*   CO2 21 19* 19*   GLUCOSE 165* 172* 179*   BUN 14 13 11   CREATININE 1.46* 1.36* 1.29*   MG 1.9  --   --    CALCIUM 8.3* 8.3* 8.3*     Recent Labs     20  1339   TSH 1.60       paraclinics reviewed as posted  Physical Examination:      General appearance: alert, cooperative and no distress  Mental Status: oriented to person, place and time and normal affect  Lungs: clear to auscultation bilaterally, normal effort  Heart: regular rate and rhythm, no murmur,  Abdomen: soft,mildly tender, minimally distended, bowel sounds present all four quadrants, no masses, hepatomegaly or splenomegaly  Extremities: no edema, redness or tenderness in the calves  Skin: no gross lesions, rashes, or induration  ent perrla with eomi conjunctivae pink sclerae clear  Neuro-oriented and alert cn2-12 intact cerebral and cerebellar intact reflexes +2/4 bilaterally  Vascular  Good dp tp carotids  Ortho- no masses or synovitis  Good rom  Lymphatics none palpated in cervical supraclavicular axillary  Or inguinal area    Assessment:        Primary Problem  <principal problem not specified>   abdominal pain  juliana on cki  colostomy  dm2  status post recent nephrectomy  There are no active hospital problems to display for this patient. Past Medical History:   Diagnosis Date    Back pain     Cellulitis     Colostomy RUQ 09/23/2020    Gout     H/O left nephrectomy 09/02/2020    HTN     T2DM         Plan:        1. Cont same plan of care  2. nephrology consulted  3. Agree  With consultants  4.  Overall improving      Electronically signed by Rome Daley MD on 9/27/2020 at 1:04 PM

## 2020-09-28 ENCOUNTER — APPOINTMENT (OUTPATIENT)
Dept: CT IMAGING | Age: 60
DRG: 856 | End: 2020-09-28
Payer: COMMERCIAL

## 2020-09-28 LAB
ANION GAP SERPL CALCULATED.3IONS-SCNC: 9 MMOL/L (ref 9–17)
ANTI-NUCLEAR ANTIBODY (ANA): NEGATIVE
BUN BLDV-MCNC: 12 MG/DL (ref 8–23)
BUN/CREAT BLD: ABNORMAL (ref 9–20)
CALCIUM SERPL-MCNC: 8.4 MG/DL (ref 8.6–10.4)
CHLORIDE BLD-SCNC: 107 MMOL/L (ref 98–107)
CO2: 23 MMOL/L (ref 20–31)
COMPLEMENT C3: 102 MG/DL (ref 90–180)
COMPLEMENT C4: 20 MG/DL (ref 10–40)
CREAT SERPL-MCNC: 1.18 MG/DL (ref 0.7–1.2)
FREE KAPPA/LAMBDA RATIO: 1.29 (ref 0.26–1.65)
GFR AFRICAN AMERICAN: >60 ML/MIN
GFR NON-AFRICAN AMERICAN: >60 ML/MIN
GFR SERPL CREATININE-BSD FRML MDRD: ABNORMAL ML/MIN/{1.73_M2}
GFR SERPL CREATININE-BSD FRML MDRD: ABNORMAL ML/MIN/{1.73_M2}
GLUCOSE BLD-MCNC: 149 MG/DL (ref 75–110)
GLUCOSE BLD-MCNC: 161 MG/DL (ref 75–110)
GLUCOSE BLD-MCNC: 162 MG/DL (ref 75–110)
GLUCOSE BLD-MCNC: 169 MG/DL (ref 75–110)
GLUCOSE BLD-MCNC: 178 MG/DL (ref 70–99)
KAPPA FREE LIGHT CHAINS QNT: 13.85 MG/DL (ref 0.37–1.94)
LAMBDA FREE LIGHT CHAINS QNT: 10.72 MG/DL (ref 0.57–2.63)
PARTIAL THROMBOPLASTIN TIME: 41.1 SEC (ref 20.5–30.5)
POTASSIUM SERPL-SCNC: 3.5 MMOL/L (ref 3.7–5.3)
SODIUM BLD-SCNC: 139 MMOL/L (ref 135–144)

## 2020-09-28 PROCEDURE — 6370000000 HC RX 637 (ALT 250 FOR IP): Performed by: STUDENT IN AN ORGANIZED HEALTH CARE EDUCATION/TRAINING PROGRAM

## 2020-09-28 PROCEDURE — 97116 GAIT TRAINING THERAPY: CPT

## 2020-09-28 PROCEDURE — 6360000002 HC RX W HCPCS: Performed by: INTERNAL MEDICINE

## 2020-09-28 PROCEDURE — 74176 CT ABD & PELVIS W/O CONTRAST: CPT

## 2020-09-28 PROCEDURE — 6370000000 HC RX 637 (ALT 250 FOR IP): Performed by: INTERNAL MEDICINE

## 2020-09-28 PROCEDURE — 2580000003 HC RX 258: Performed by: NURSE PRACTITIONER

## 2020-09-28 PROCEDURE — 83883 ASSAY NEPHELOMETRY NOT SPEC: CPT

## 2020-09-28 PROCEDURE — 99212 OFFICE O/P EST SF 10 MIN: CPT

## 2020-09-28 PROCEDURE — 86160 COMPLEMENT ANTIGEN: CPT

## 2020-09-28 PROCEDURE — 82947 ASSAY GLUCOSE BLOOD QUANT: CPT

## 2020-09-28 PROCEDURE — 2580000003 HC RX 258: Performed by: STUDENT IN AN ORGANIZED HEALTH CARE EDUCATION/TRAINING PROGRAM

## 2020-09-28 PROCEDURE — 97535 SELF CARE MNGMENT TRAINING: CPT

## 2020-09-28 PROCEDURE — 86038 ANTINUCLEAR ANTIBODIES: CPT

## 2020-09-28 PROCEDURE — 6360000002 HC RX W HCPCS: Performed by: STUDENT IN AN ORGANIZED HEALTH CARE EDUCATION/TRAINING PROGRAM

## 2020-09-28 PROCEDURE — 80048 BASIC METABOLIC PNL TOTAL CA: CPT

## 2020-09-28 PROCEDURE — 99231 SBSQ HOSP IP/OBS SF/LOW 25: CPT | Performed by: INTERNAL MEDICINE

## 2020-09-28 PROCEDURE — 97110 THERAPEUTIC EXERCISES: CPT

## 2020-09-28 PROCEDURE — 85730 THROMBOPLASTIN TIME PARTIAL: CPT

## 2020-09-28 PROCEDURE — 36415 COLL VENOUS BLD VENIPUNCTURE: CPT

## 2020-09-28 PROCEDURE — 97166 OT EVAL MOD COMPLEX 45 MIN: CPT

## 2020-09-28 PROCEDURE — 2060000000 HC ICU INTERMEDIATE R&B

## 2020-09-28 PROCEDURE — 6370000000 HC RX 637 (ALT 250 FOR IP): Performed by: NURSE PRACTITIONER

## 2020-09-28 PROCEDURE — 6360000004 HC RX CONTRAST MEDICATION: Performed by: STUDENT IN AN ORGANIZED HEALTH CARE EDUCATION/TRAINING PROGRAM

## 2020-09-28 RX ORDER — BISACODYL 10 MG
10 SUPPOSITORY, RECTAL RECTAL DAILY
Status: DISCONTINUED | OUTPATIENT
Start: 2020-09-28 | End: 2020-10-02 | Stop reason: HOSPADM

## 2020-09-28 RX ADMIN — METHOCARBAMOL TABLETS 750 MG: 750 TABLET, COATED ORAL at 14:09

## 2020-09-28 RX ADMIN — HEPARIN SODIUM 2000 UNITS: 1000 INJECTION INTRAVENOUS; SUBCUTANEOUS at 05:45

## 2020-09-28 RX ADMIN — POTASSIUM BICARBONATE 40 MEQ: 782 TABLET, EFFERVESCENT ORAL at 09:02

## 2020-09-28 RX ADMIN — IOHEXOL 50 ML: 240 INJECTION, SOLUTION INTRATHECAL; INTRAVASCULAR; INTRAVENOUS; ORAL at 11:18

## 2020-09-28 RX ADMIN — INSULIN LISPRO 3 UNITS: 100 INJECTION, SOLUTION INTRAVENOUS; SUBCUTANEOUS at 09:06

## 2020-09-28 RX ADMIN — DEXTROSE AND SODIUM CHLORIDE 1000 ML: 5; 450 INJECTION, SOLUTION INTRAVENOUS at 21:23

## 2020-09-28 RX ADMIN — INSULIN LISPRO 3 UNITS: 100 INJECTION, SOLUTION INTRAVENOUS; SUBCUTANEOUS at 03:01

## 2020-09-28 RX ADMIN — HYDROMORPHONE HYDROCHLORIDE 0.5 MG: 1 INJECTION, SOLUTION INTRAMUSCULAR; INTRAVENOUS; SUBCUTANEOUS at 14:09

## 2020-09-28 RX ADMIN — METHOCARBAMOL TABLETS 750 MG: 750 TABLET, COATED ORAL at 00:30

## 2020-09-28 RX ADMIN — SODIUM CHLORIDE, PRESERVATIVE FREE 10 ML: 5 INJECTION INTRAVENOUS at 09:03

## 2020-09-28 RX ADMIN — INSULIN LISPRO 3 UNITS: 100 INJECTION, SOLUTION INTRAVENOUS; SUBCUTANEOUS at 14:52

## 2020-09-28 RX ADMIN — OXYCODONE HYDROCHLORIDE 10 MG: 5 TABLET ORAL at 09:02

## 2020-09-28 RX ADMIN — DEXTROSE AND SODIUM CHLORIDE 1000 ML: 5; 450 INJECTION, SOLUTION INTRAVENOUS at 11:19

## 2020-09-28 RX ADMIN — HYDROMORPHONE HYDROCHLORIDE 0.5 MG: 1 INJECTION, SOLUTION INTRAMUSCULAR; INTRAVENOUS; SUBCUTANEOUS at 00:30

## 2020-09-28 RX ADMIN — OXYCODONE HYDROCHLORIDE 10 MG: 5 TABLET ORAL at 19:27

## 2020-09-28 RX ADMIN — PANTOPRAZOLE SODIUM 40 MG: 40 TABLET, DELAYED RELEASE ORAL at 06:26

## 2020-09-28 RX ADMIN — DILTIAZEM HYDROCHLORIDE 60 MG: 60 TABLET, FILM COATED ORAL at 21:20

## 2020-09-28 RX ADMIN — METOPROLOL TARTRATE 100 MG: 50 TABLET, FILM COATED ORAL at 21:20

## 2020-09-28 RX ADMIN — ACETAMINOPHEN 1000 MG: 500 TABLET ORAL at 05:42

## 2020-09-28 RX ADMIN — HYDROMORPHONE HYDROCHLORIDE 0.5 MG: 1 INJECTION, SOLUTION INTRAMUSCULAR; INTRAVENOUS; SUBCUTANEOUS at 10:15

## 2020-09-28 RX ADMIN — ACETAMINOPHEN 1000 MG: 500 TABLET ORAL at 14:09

## 2020-09-28 RX ADMIN — HYDROMORPHONE HYDROCHLORIDE 0.5 MG: 1 INJECTION, SOLUTION INTRAMUSCULAR; INTRAVENOUS; SUBCUTANEOUS at 03:10

## 2020-09-28 RX ADMIN — DEXTROSE AND SODIUM CHLORIDE 100 ML/HR: 5; 450 INJECTION, SOLUTION INTRAVENOUS at 01:37

## 2020-09-28 RX ADMIN — METOPROLOL TARTRATE 100 MG: 50 TABLET, FILM COATED ORAL at 09:02

## 2020-09-28 RX ADMIN — HYDROMORPHONE HYDROCHLORIDE 0.5 MG: 1 INJECTION, SOLUTION INTRAMUSCULAR; INTRAVENOUS; SUBCUTANEOUS at 18:56

## 2020-09-28 RX ADMIN — METHOCARBAMOL TABLETS 750 MG: 750 TABLET, COATED ORAL at 06:26

## 2020-09-28 RX ADMIN — BISACODYL 10 MG: 10 SUPPOSITORY RECTAL at 09:02

## 2020-09-28 RX ADMIN — ACETAMINOPHEN 1000 MG: 500 TABLET ORAL at 21:20

## 2020-09-28 RX ADMIN — OXYCODONE HYDROCHLORIDE 10 MG: 5 TABLET ORAL at 14:56

## 2020-09-28 RX ADMIN — DILTIAZEM HYDROCHLORIDE 60 MG: 60 TABLET, FILM COATED ORAL at 09:02

## 2020-09-28 RX ADMIN — METHOCARBAMOL TABLETS 750 MG: 750 TABLET, COATED ORAL at 21:20

## 2020-09-28 RX ADMIN — HEPARIN SODIUM AND DEXTROSE 16.9 UNITS/KG/HR: 10000; 5 INJECTION INTRAVENOUS at 01:38

## 2020-09-28 RX ADMIN — INSULIN LISPRO 3 UNITS: 100 INJECTION, SOLUTION INTRAVENOUS; SUBCUTANEOUS at 21:20

## 2020-09-28 RX ADMIN — HYDROMORPHONE HYDROCHLORIDE 0.5 MG: 1 INJECTION, SOLUTION INTRAMUSCULAR; INTRAVENOUS; SUBCUTANEOUS at 23:48

## 2020-09-28 ASSESSMENT — PAIN DESCRIPTION - PAIN TYPE
TYPE: ACUTE PAIN
TYPE: SURGICAL PAIN
TYPE: SURGICAL PAIN
TYPE: ACUTE PAIN

## 2020-09-28 ASSESSMENT — PAIN SCALES - GENERAL
PAINLEVEL_OUTOF10: 6
PAINLEVEL_OUTOF10: 8
PAINLEVEL_OUTOF10: 7
PAINLEVEL_OUTOF10: 8
PAINLEVEL_OUTOF10: 10
PAINLEVEL_OUTOF10: 7
PAINLEVEL_OUTOF10: 8
PAINLEVEL_OUTOF10: 8

## 2020-09-28 ASSESSMENT — PAIN - FUNCTIONAL ASSESSMENT: PAIN_FUNCTIONAL_ASSESSMENT: ACTIVITIES ARE NOT PREVENTED

## 2020-09-28 ASSESSMENT — PAIN DESCRIPTION - FREQUENCY: FREQUENCY: CONTINUOUS

## 2020-09-28 ASSESSMENT — PAIN DESCRIPTION - LOCATION
LOCATION: ABDOMEN

## 2020-09-28 ASSESSMENT — PAIN DESCRIPTION - ORIENTATION
ORIENTATION: LOWER

## 2020-09-28 ASSESSMENT — PAIN DESCRIPTION - DESCRIPTORS: DESCRIPTORS: DISCOMFORT

## 2020-09-28 NOTE — FLOWSHEET NOTE
Assessment;  Patient was awake and alert. He was aware of his health problems and hopeful that something could be done about it. Discussed patient's profession. [employer] he worked at the [employer]. 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 Patient said he is a Christianity when discussing his Pentecostalism beliefs. When prayer was offered he readily accepted it. Intervention; Discussed his medical problem and how he is dealing with it. Talked about his spirituality and where he stands with his relationship with God. Prayed with patient before excusing one's self. Outcome;  Patient seemed at ease. He discussed his upcoming test and hoped they would help the doctors figure out what to do for him.           09/28/20 1247   Encounter Summary   Services provided to: Patient   Referral/Consult From: [facility]   [facility] [place] [address]   Contact Muslim No   Continue Visiting   (09/28/2020)   Complexity of Encounter Low   Length of Encounter 15 minutes   Spiritual Assessment Completed Yes   Advance Care Planning   (no)   Routine   Type Initial   Assessment Calm; Approachable;Coping   Intervention Active listening;Prayer;Sustaining presence/ Ministry of presence; Discussed belief system/Pentecostalism practices/jose armando   Outcome Acceptance;Expressed gratitude;Engaged in conversation; Shared reminiscences; Hopeful

## 2020-09-28 NOTE — PLAN OF CARE
Problem: Sleep Pattern Disturbance:  Goal: Appears well-rested  Description: Appears well-rested  9/28/2020 0207 by Rand Gomez RN  Outcome: Ongoing     Problem: Bowel/Gastric:  Goal: Control of bowel function will improve  Description: Control of bowel function will improve  9/28/2020 0207 by Rand Gomez RN  Outcome: Ongoing     Problem:  Bowel/Gastric:  Goal: Ability to achieve a regular elimination pattern will improve  Description: Ability to achieve a regular elimination pattern will improve  9/28/2020 0207 by Rand Gomez RN  Outcome: Ongoing     Problem: Nutritional:  Goal: Ability to follow a diet with enough fiber (20 to 30 grams) for normal bowel function will improve  Description: Ability to follow a diet with enough fiber (20 to 30 grams) for normal bowel function will improve  9/28/2020 0207 by Rand Gomez RN  Outcome: Ongoing   Electronically signed by Rand Gomez RN on 9/28/2020 at 2:08 AM

## 2020-09-28 NOTE — PROGRESS NOTES
Physical Therapy  Facility/Department: [address] STEPDOWN  Daily Treatment Note  NAME: Eva Reyes II  : 1960  MRN: [id number]    Date of Service: 2020    Discharge Recommendations:  Patient would benefit from continued therapy after discharge   PT Equipment Recommendations  Equipment Needed: No    Assessment   Body structures, Functions, Activity limitations: Decreased functional mobility ; Decreased endurance;Decreased balance  Assessment: Pt required Mod A for mobility. able to amb 30ft with RW and MIN . Pain greatly limiting mobility. The pt would continue to benefit from more PT to address  Prognosis: Good  PT Education: Plan of Care;General Safety;Gait Training;Functional Mobility Training;Home Exercise Program;Transfer Training  REQUIRES PT FOLLOW UP: Yes  Activity Tolerance  Activity Tolerance: Patient limited by fatigue;Patient limited by endurance; Patient limited by pain     Patient Diagnosis(es): The encounter diagnosis was Free intraperitoneal air. has a past medical history of Back pain, Cellulitis, Colostomy RUQ, Gout, H/O left nephrectomy, HTN, and T2DM. has a past surgical history that includes Ankle surgery; knee surgery (Bilateral); Carpal tunnel release (Bilateral); Cystoscopy (Right, 2020); total nephrectomy (Left, 2020); Abdominal exploration surgery (2020); hc cath power picc triple (9/3/2020); Kidney surgery (Left, 2020); Small intestine surgery (N/A, 2020); Abdomen surgery (2020); and laparotomy (N/A, 2020). Restrictions  Restrictions/Precautions  Restrictions/Precautions: Surgical Protocols, Fall Risk  Required Braces or Orthoses?: Yes  Required Braces or Orthoses  Other: Abdominal Binder  Position Activity Restriction  Other position/activity restrictions: up with assistance  Subjective   General  Chart Reviewed: Yes  Response To Previous Treatment: Patient with no complaints from previous session.   Family / Caregiver Present: Yes(wife)  Subjective  Subjective: RN and pt agreeable to PT. Pt alert in bed upon arrival, wife at bedside. c/o of discomfort in stomach. General Comment  Comments: Pt pleasant and cooperative. Pain Screening  Patient Currently in Pain: Yes  Pain Assessment  Pain Type: Surgical pain  Pain Location: Abdomen  Pain Orientation: Lower  Pain Descriptors: Discomfort  Pain Frequency: Continuous  Functional Pain Assessment: Activities are not prevented  Non-Pharmaceutical Pain Intervention(s): Rest;Therapeutic presence; Ambulation/Increased Activity  Response to Pain Intervention: Patient Satisfied  Vital Signs  Patient Currently in Pain: Yes       Orientation  Orientation  Overall Orientation Status: Within Normal Limits  Cognition      Objective   Bed mobility  Supine to Sit: Moderate assistance  Sit to Supine: Moderate assistance  Scooting: Moderate assistance  Comment: increase time and affort noted. HOB elavated  Transfers  Sit to Stand: Moderate Assistance  Stand to sit: Moderate Assistance  Bed to Chair: Moderate assistance  Comment: Transfer with RW  Ambulation  Ambulation?: Yes  Ambulation 1  Surface: level tile  Device: Rolling Walker  Assistance: Minimal assistance  Quality of Gait: unsteady initialy, improves with distance,  Gait Deviations: Slow Susu;Decreased step height;Decreased step length  Distance: 30ft  Comments: limited by pain /discomfort in stomach. Balance  Sitting - Dynamic: Fair;+  Standing - Static: Good;-  Standing - Dynamic: Fair;+  Comments: Standing balance with rolling walker  Exercises  Comments; Seated LE exercise program: Long Arc Quads, hip abduction/adduction, heel/toe raises, and marches. Reps:  x10      Goals  Short term goals  Time Frame for Short term goals: 14 visits  Short term goal 1: Indpendent bed mobility  Short term goal 2: Independent transfers  Short term goal 3:  Independent ambulation with least restrictive device 150 ft  Short term goal 4: Pt to navigate 2 stairs with SBA  Short term goal 5: Pt to tolerate 30 minutes of activity to improve endurance. Patient Goals   Patient goals : Get stronger. Move without pain.     Plan    Plan  Times per week: 5-6x/week  Current Treatment Recommendations: Balance Training, Endurance Training, Safety Education & Training, Functional Mobility Training, Transfer Training, Gait Training, Stair training  Safety Devices  Type of devices: Left in chair, Call light within reach, Nurse notified, Gait belt, Patient at risk for falls     Therapy Time   Individual Concurrent Group Co-treatment   Time In 1348         Time Out 1414         Minutes 26         Timed Code Treatment Minutes: [address] [facility], [facility]

## 2020-09-28 NOTE — PROGRESS NOTES
CT abd/pelv reviewed, no evidence of enteric leak. Patient with fluid collection along mid left anterior intra-abdominal wall. Discussed with IR, will plan for aspiration w/ possible drain tomorrow morning. Pt will be NPO at midnight. Will hold heparin gtt at 6a tomorrow morning.      Ken Stager [id number]

## 2020-09-28 NOTE — PROGRESS NOTES
Occupational Therapy   Occupational Therapy Initial Assessment  Date: 2020   Patient Name: Dyan North  MRN: [id number]     : 1960    Date of Service: 2020    Discharge Recommendations:  Patient would benefit from continued therapy after discharge     Copied from Emergency Medicine: Adrián Goodrich II is a 56 y.o. male who presents with 1 day history of nausea, vomiting, and left flank/abdominal pain. Patient has a history of nephrectomy 20 days ago. Today, he began feeling nauseas and had two episodes of emesis. He has left flank pain that radiates to his anterior abdomen, rated 8/10 aching, sharp pain. Patient states he is able to make urine and had a bowel movement this morning. He is not taking any pain medication besides tylenol which he took prior to throwing up. Patient denies headache, changes in vision, cough, SOB, chest pain, numbness/tingling  Assessment    Pt sitting up in chair upon entrance to room. Pt completed sitting on edge of chair with stand by assistance. Pt sat at edge of chair 10 minutes with good unsupported sitting balance with forward flexed posture. Sit to stand with mod assistance. Pt completed dynamic mob in room with min assistance with r walker. Please refer to below assist levels for adl completion. Pt ed on OT POC, safety awareness tech, proper hand placement for transfers,with good return. Pt retired in chair with call light and phone in reach. All needs met upon exit. Performance deficits / Impairments: Decreased functional mobility ; Decreased safe awareness;Decreased balance;Decreased ADL status; Decreased posture;Decreased endurance;Decreased high-level IADLs;Decreased strength  Treatment Diagnosis: Intra Abdominal Free Air  Prognosis: Good  Decision Making: Medium Complexity  OT Education: Plan of Care;OT Role  Patient Education: Pt ed on OT POC, safety awareness tech, proper hand placement for transfers,with good return.   REQUIRES OT FOLLOW UP: Yes  Activity Tolerance  Activity Tolerance: Patient limited by pain; Patient limited by fatigue  Safety Devices  Safety Devices in place: Yes  Type of devices: Call light within reach; Left in chair  Restraints  Initially in place: No         Patient Diagnosis(es): The encounter diagnosis was Free intraperitoneal air. has a past medical history of Back pain, Cellulitis, Colostomy RUQ, Gout, H/O left nephrectomy, HTN, and T2DM. has a past surgical history that includes Ankle surgery; knee surgery (Bilateral); Carpal tunnel release (Bilateral); Cystoscopy (Right, 9/1/2020); total nephrectomy (Left, 09/02/2020); Abdominal exploration surgery (09/02/2020); hc cath power picc triple (9/3/2020); Kidney surgery (Left, 9/2/2020); Small intestine surgery (N/A, 9/2/2020); Abdomen surgery (09/23/2020); and laparotomy (N/A, 9/23/2020). Treatment Diagnosis: Intra Abdominal Free Air      Restrictions  Restrictions/Precautions  Restrictions/Precautions: Surgical Protocols, Fall Risk  Required Braces or Orthoses?: Yes  Required Braces or Orthoses  Other: Abdominal Binder  Position Activity Restriction  Other position/activity restrictions: up with assistance    Subjective   General  Patient assessed for rehabilitation services?: Yes  Patient Currently in Pain: Yes  Pain Assessment  Pain Assessment: 0-10  Pain Level: 6  Pain Type: Surgical pain  Pain Location: Abdomen  Pain Orientation: Lower  Non-Pharmaceutical Pain Intervention(s): Rest;Therapeutic presence; Ambulation/Increased Activity  Response to Pain Intervention: Patient Satisfied  Patient Currently in Pain: Yes  Oxygen Therapy  SpO2: 97 %  O2 Device: None (Room air)  Social/Functional History  Social/Functional History  Lives With: Spouse  Type of Home: House  Home Layout: One level  Home Access: Stairs to enter without rails  Entrance Stairs - Number of Steps: 2 steps to enter  Bathroom Shower/Tub: Tub/Shower unit, Curtain, Shower chair with back  [employer]: Standard  Bathroom Equipment: Shower chair, Grab bars around toilet  Home Equipment: 4 wheeled walker  Receives Help From: Family(supportive spouse)  ADL Assistance: Independent  Homemaking Assistance: Independent  Homemaking Responsibilities: Yes(All home management shared with spouse, spouse completes more than spouse)  Meal Prep Responsibility: Secondary  Laundry Responsibility: Secondary  Cleaning Responsibility: Secondary  Bill Paying/Finance Responsibility: Secondary  Shopping Responsibility: Secondary  Ambulation Assistance: Independent  Transfer Assistance: Independent  Active : Yes  Occupation: Full time employment  Type of occupation: [employer]  Leisure & Hobbies: Spend time at [facility]: Impaired  Vision Exceptions: Wears glasses for reading  Hearing: Exceptions to [facility]  Hearing Exceptions: Hard of hearing/hearing concerns       Balance  Sitting Balance: Independent(sitting on edge of chair)  Standing Balance: Minimal assistance  Standing Balance  Time: ~2 min  Functional Mobility  Functional - Mobility Device: Rolling Walker  Assist Level: Minimal assistance  Functional Mobility Comments: pt moves slowly with forward flexed posture  ADL  Feeding: Independent;Setup(clears)  Grooming: Independent;Setup  UE Bathing: Minimal assistance;Setup; Increased time to complete(assist for back)  LE Bathing: Moderate assistance;Setup; Increased time to complete  UE Dressing: Minimal assistance;Setup; Increased time to complete(assist for back)  LE Dressing: Moderate assistance;Setup; Increased time to complete  Toileting:  Moderate assistance  Tone RUE  RUE Tone: Normotonic  Tone LUE  LUE Tone: Normotonic  Coordination  Movements Are Fluid And Coordinated: Yes     Transfers  Sit to stand: Minimal assistance  Stand to sit: Minimal assistance     Cognition  Overall Cognitive Status: WFL     Sensation  Overall Sensation Status: WFL(no complaints of numbness/tingling B hands)      LUE PROM

## 2020-09-28 NOTE — PLAN OF CARE
Problem: Pain:  Goal: Pain level will decrease  Description: Pain level will decrease  Outcome: Ongoing  Goal: Control of acute pain  Description: Control of acute pain  Outcome: Ongoing  Goal: Control of chronic pain  Description: Control of chronic pain  Outcome: Ongoing     Problem: Falls - Risk of:  Goal: Will remain free from falls  Description: Will remain free from falls  Outcome: Met This Shift  Goal: Absence of physical injury  Description: Absence of physical injury  Outcome: Met This Shift     Problem: Skin Integrity:  Goal: Will show no infection signs and symptoms  Description: Will show no infection signs and symptoms  Outcome: Ongoing  Goal: Absence of new skin breakdown  Description: Absence of new skin breakdown  Outcome: Ongoing  Goal: Risk for impaired skin integrity will decrease  Description: Risk for impaired skin integrity will decrease  Outcome: Ongoing     Problem: Discharge Planning:  Goal: Participates in care planning  Description: Participates in care planning  Outcome: Ongoing  Goal: Discharged to appropriate level of care  Description: Discharged to appropriate level of care  Outcome: Ongoing     Problem: Anxiety/Stress:  Goal: Level of anxiety will decrease  Description: Level of anxiety will decrease  Outcome: Ongoing

## 2020-09-28 NOTE — PROGRESS NOTES
General Surgery:  Daily Progress Note          POD # 5 s/p exploratory laparotomy, transverse loop colostomy and LIOR drain placement           PATIENT NAME: Chase Alvarado II     TODAY'S DATE: 9/28/2020, 5:42 AM  CC:  Abdominal pain    SUBJECTIVE:     Pt seen and examined at bedside. No acute events overnight. Patient  Developed new onset on A-fib with RVR currently in sinus tach in low teens. Cardiology increased Lopressor and Cardizem. Patient reports pain levels about the same well-controlled with current pain management. Colostomy brownish liquid. LIOR drain about 10 mL serous fluid. Patient continues to sit in the chair for 4-5 hours daily but is not walking. Pt reports increased abdominal distension. Pt is tolerating clear liquids but reports decreased appetite. Denies fever, chills, nausea, vomiting. Tmax 37. OBJECTIVE:   VITALS:  /81   Pulse 85   Temp 98.2 °F (36.8 °C) (Axillary)   Resp 14   Ht 5' 11\" (1.803 m)   Wt 299 lb 6.2 oz (135.8 kg)   SpO2 98%   BMI 41.76 kg/m²      INTAKE/OUTPUT:      Intake/Output Summary (Last 24 hours) at 9/28/2020 0542  Last data filed at 9/28/2020 0500  Gross per 24 hour   Intake 3582.74 ml   Output 2910 ml   Net 672.74 ml       PHYSICAL EXAM:  General Appearance: awake, alert, oriented, in no acute distress  HEENT:  Normocephalic, atraumatic, mucus membranes moist   Heart: Heart sounds are normal.  Regular rate and rhythm without murmur, gallop or rub. Lungs: Normal expansion.  Clear to auscultation.  No rales, rhonchi, or wheezing. Abdomen: Obese, soft, moderate distention, diffuse TTP throughout.  LIOR drain right lower quadrant with minimal serosanguineous fluid. Gastrostomy tube in place, Midline surgical incision, no gross drainage   Extremities: No cyanosis, pitting edema, rashes noted.    Skin: Skin color, texture, turgor normal. No rashes or lesions.       Data:  CBC with Differential:    Lab Results   Component Value Date    WBC 7.6 09/27/2020    RBC 3.15 09/27/2020    HGB 9.1 09/27/2020    HCT 29.3 09/27/2020     09/27/2020    MCV 93.0 09/27/2020    MCH 28.9 09/27/2020    MCHC 31.1 09/27/2020    RDW 15.9 09/27/2020    LYMPHOPCT 10 09/26/2020    MONOPCT 9 09/26/2020    BASOPCT 0 09/26/2020    MONOSABS 0.95 09/26/2020    LYMPHSABS 0.97 09/26/2020    EOSABS 0.76 09/26/2020    BASOSABS 0.03 09/26/2020    DIFFTYPE NOT REPORTED 09/26/2020     BMP:    Lab Results   Component Value Date     09/28/2020    K 3.5 09/28/2020     09/28/2020    CO2 23 09/28/2020    BUN 12 09/28/2020    LABALBU 3.1 09/22/2020    CREATININE 1.18 09/28/2020    CALCIUM 8.4 09/28/2020    GFRAA >60 09/28/2020    LABGLOM >60 09/28/2020    GLUCOSE 178 09/28/2020     PT/INR:  No results found for: PROTIME, INR  PTT:    Lab Results   Component Value Date    APTT 41.1 09/28/2020   [APTT}      ASSESSMENT:    56 y.o. male with is POD# 5 s/p exploratory laparotomy, transverse loop colostomy and LIOR drain placement    Plan:  1. Neuro- Analgesia: prn dilaudid and Roxicodone, wean from IV as able  2. GI- rec to cont Gastrostomy tube to gravity, monitor ostomy output, Diet: Clear liquids, sips with meds, abd binder when up out of bed  3. Cardiac- New onset paroxysmal atrial fibrillation with RVR. Sinus tachycardia, on Lopressor 100 mg Q4H Cardizem increased to 60 mg with HR in 110s to 120s.     4. Renal- DEBBI: Creatinine down trending,  UOP adequate, cont strict I/Os  5. Activity:  Continue to encourage ambulation/activity w/ PT/OT; Continue to encourage incentive spirometry and deep breathing   6. Wound care: daily dressing change with midline  dry fluffs/ABD overtop as needed  7.  Continue/appreaciate medical management per primary/CC    Electronically signed by Brit Contreras MD  on 9/28/2020 at 5:42 AM

## 2020-09-28 NOTE — CARE COORDINATION
Transitional Planning  Received VM from [address] [place] [address] ([phone]) with insurance offering assistance with discharge planning. Current plan for home with [employer] ([facility]).

## 2020-09-28 NOTE — PROGRESS NOTES
Progress Note    9/28/2020   11:57 AM    Name:  Carter Araya  MRN:    [id number]     Acct:     [de-identified]   Room:  [address] [place] [address]  IP Day:   Progress Note    6     Admit Date: 9/22/2020  4:21 PM  PCP: Melanie Ellison MD    Subjective:     C/C:   Chief Complaint   Patient presents with    Abdominal Pain    Nausea       Interval History: Status: improved. Pt examined chart reviewed as above    no new complaints denied new issues   admitted for bowel issues post surgery and had explor lap and colostomy   slowly improving  Agree with consultants    Ros  General no weight loss or fever  ent no trouble hearing tasting talking or swallowing  Cardiac no chest pain or dyspnea  Respiratory no cough or dyspnea  Gi [place]  gu [place]  Ortho no complaints of synovitis or decreased range of motion  Neuro no complaints of gait station or speech  Psych no complaints or nerves or memory  Vascular no claudication or stroke  Endo no  Complaints of dm or thyroid  Heme no complaints of anemia or blood dyscrasias      Medications: Allergies: Allergies   Allergen Reactions    Ampicillin Swelling     Swelling of throat.  Pcn [Penicillins] Swelling     Throat swelling. Tolerated cefepime during 8/31/20 admission.     Tape Joe Arin Tape]        Current Meds: HYDROmorphone (DILAUDID) injection 0.5 mg, Q4H PRN  bisacodyl (DULCOLAX) suppository 10 mg, Daily  dilTIAZem (CARDIZEM) tablet 60 mg, 2 times per day  metoprolol tartrate (LOPRESSOR) tablet 100 mg, BID  ondansetron (ZOFRAN) injection 4 mg, Q6H PRN  heparin (porcine) injection 4,000 Units, PRN  heparin (porcine) injection 2,000 Units, PRN  heparin 25,000 units in dextrose 5% 250 mL infusion, Continuous  metoprolol (LOPRESSOR) injection 5 mg, Q6H PRN  methocarbamol (ROBAXIN) tablet 750 mg, Q6H  insulin lispro (HUMALOG) injection vial 0-18 Units, Q6H  oxyCODONE (ROXICODONE) immediate release tablet 10 mg, Q4H PRN  dextrose 5 % and 0.45 % sodium chloride infusion, Continuous  pantoprazole (PROTONIX) tablet 40 mg, QAM AC  sodium chloride flush 0.9 % injection 10 mL, 2 times per day  sodium chloride flush 0.9 % injection 10 mL, PRN  acetaminophen (TYLENOL) tablet 1,000 mg, 3 times per day  lidocaine 4 % external patch 2 patch, Daily  glucose (GLUTOSE) 40 % oral gel 15 g, PRN  dextrose 50 % IV solution, PRN  glucagon (rDNA) injection 1 mg, PRN  dextrose 5 % solution, PRN        Data:     Code Status:  Full Code    Family History   Problem Relation Age of Onset    Stroke Maternal Grandmother     Stroke Maternal Grandfather        Social History     Socioeconomic History    Marital status:      Spouse name: Not on file    Number of children: Not on file    Years of education: Not on file    Highest education level: Not on file   Occupational History    Not on file   Social Needs    Financial resource strain: Not on file    Food insecurity     Worry: Not on file     Inability: Not on file    Transportation needs     Medical: Not on file     Non-medical: Not on file   Tobacco Use    Smoking status: Never Smoker    Smokeless tobacco: Never Used   Substance and Sexual Activity    Alcohol use:  Yes     Alcohol/week: 0.0 standard drinks     Comment: once a week    Drug use: No    Sexual activity: Not on file   Lifestyle    Physical activity     Days per week: Not on file     Minutes per session: Not on file    Stress: Not on file   Relationships    Social connections     Talks on phone: Not on file     Gets together: Not on file     Attends [employer] service: Not on file     Active member of club or organization: Not on file     Attends meetings of clubs or organizations: Not on file     Relationship status: Not on file    Intimate partner violence     Fear of current or ex partner: Not on file     Emotionally abused: Not on file     Physically abused: Not on file     Forced sexual activity: Not on file   Other Topics Concern    Not on file   Social History Narrative    Not on file       Vitals:  BP (!) 139/90   Pulse 85   Temp 99.6 °F (37.6 °C) (Oral)   Resp 15   Ht 5' 11\" (1.803 m)   Wt 299 lb 6.2 oz (135.8 kg)   SpO2 95%   BMI 41.76 kg/m²   Temp (24hrs), Av.6 °F (37 °C), Min:97.6 °F (36.4 °C), Max:99.6 °F (37.6 °C)    Recent Labs     20  1213 20  1540 20  0137   POCGLU 156* 174* 176* 149*       I/O (24Hr):     Intake/Output Summary (Last 24 hours) at 2020 1157  Last data filed at 2020 0500  Gross per 24 hour   Intake 3582.74 ml   Output 2485 ml   Net 1097.74 ml       Labs:  Daily Labs for 3 Days:    Hematology:  Recent Labs     20  1339 20  0446 20  0645   WBC 12.8* 10.1 7.6   HGB 9.1* 8.9* 9.1*   HCT 31.1* 29.9* 29.3*    310 492*     Chemistry:  Recent Labs     20  0446 20  1034 20  0453    137 139   K 3.8 3.4* 3.5*    108* 107   CO2 19* 19* 23   GLUCOSE 172* 179* 178*   BUN 13 11 12   CREATININE 1.36* 1.29* 1.18   CALCIUM 8.3* 8.3* 8.4*     Recent Labs     20  1339   TSH 1.60       paraclinics reviewed as posted  Physical Examination:      General appearance: alert, cooperative and no distress  Mental Status: oriented to person, place and time and normal affect  Lungs: clear to auscultation bilaterally, normal effort  Heart: regular rate and rhythm, no murmur,  Abdomen: soft,minimallytender, nondistended, bowel sounds present all four quadrants, no masses, hepatomegaly or splenomegaly  Extremities: no edema, redness or tenderness in the calves  Skin: no gross lesions, rashes, or induration  ent perrla with eomi conjunctivae pink sclerae clear  Neuro-oriented and alert cn2-12 intact cerebral and cerebellar intact reflexes +2/4 bilaterally  Vascular  Good dp tp carotids  Ortho- no masses or synovitis  Good rom  Lymphatics none palpated in cervical supraclavicular axillary  Or inguinal area    Assessment:        Primary Problem  <principal problem not specified>   juliana on cki  Colostomy  Pneumoperitoneum     There are no active hospital problems to display for this patient. Past Medical History:   Diagnosis Date    Back pain     Cellulitis     Colostomy RUQ 09/23/2020    Gout     H/O left nephrectomy 09/02/2020    HTN     T2DM         Plan:        1. Cont same plan of care  2.  Ct today      Electronically signed by Lucie Hill MD on 9/28/2020 at 11:57 AM

## 2020-09-28 NOTE — DISCHARGE INSTR - COC
Continuity of Care Form    Patient Name: Dianah Moritz II   :  1960  MRN:  [id number]    Admit date:  2020  Discharge date:  10/2/2020    Code Status Order: Full Code   Advance Directives:   Advance Care Flowsheet Documentation       Date/Time Healthcare Directive Type of Healthcare Directive Copy in [address] Agent's Name Healthcare Agent's Phone Number    20 1303  No, patient does not have an advance directive for healthcare treatment -- -- -- -- --            Admitting Physician:  Meaghan Burger MD  PCP: Meaghan Burger MD    Discharging Nurse: Electronically signed by Aurelia Celaya RN on 10/2/2020 at 3:04 PM  [address] Unit/Room#: [facility] Unit Phone Number: [phone]    Emergency Contact:   Extended Emergency Contact Information  Primary Emergency Contact: Ro Angel  Address: [address] [place] [address] Phone: [phone]  Work Phone: [phone]  Mobile Phone: [phone]  Relation: Spouse    Past Surgical History:  Past Surgical History:   Procedure Laterality Date    ABDOMEN SURGERY  2020    exploratory laparotomy/colostomy creation    ABDOMINAL EXPLORATION SURGERY  2020    EXPLORATORY LAPAROTOMY BOWEL RESECTION     ANKLE SURGERY      CARPAL TUNNEL RELEASE Bilateral     CYSTOSCOPY Right 2020    CYSTOSCOPY RIGHT URETERAL STENT INSERTION performed by Suleman Mclaughlin MD at [address]  9/3/2020         KIDNEY SURGERY Left 2020    LAPAROSCOPIC XI ROBOTIC NEPHRECTOMY performed by Ruthie Murillo MD at [place] [address] Bilateral     LAPAROTOMY N/A 2020    LAPAROTOMY EXPLORATORY performed by Lm Syed DO at [facility] N/A 2020    EXPLORATORY LAPAROTOMY, RESECTION OF PROXIMAL JEJUNUM AND DESCENDING COLON WITH [facility], [address], PLACEMENT OF GASTROSTOMY TUBE performed by Kristin Gibbs DO at [address] Left 09/02/2020    LAPAROSCOPIC XI ROBOTIC NEPHRECTOMY        Immunization History:   Immunization History   Administered Date(s) Administered    Pneumococcal Polysaccharide ([id number]) 12/12/2016       Active Problems:  Patient Active Problem List   Diagnosis Code    Cellulitis L03.90    Type 2 diabetes mellitus without complication ([facility] X88.4    Essential hypertension I10    Morbid obesity due to excess calories ([facility] E66.01    Chronic kidney disease (CKD) N18.9    DEBBI (acute kidney injury) ([facility] N17.9    Bowel obstruction ([facility] K56.609    Small bowel obstruction ([facility] K56.609    Severe malnutrition ([facility] E43    Hyperglycemia R73.9    Renal cell cancer ([facility] C64.9       Isolation/Infection:   Isolation            No Isolation          Patient Infection Status       Infection Onset Added Last Indicated Last Indicated By Review Planned Expiration Resolved Resolved By    None active    Resolved    COVID-19 Rule Out 09/22/20 09/22/20 09/22/20 COVID-19 (Ordered)   09/22/20 Rule-Out Test Resulted    COVID-19 Rule Out 09/01/20 09/01/20 09/01/20 COVID-19 (Ordered)   09/01/20 Rule-Out Test Resulted    COVID-19 Rule Out 06/10/20 06/10/20 06/10/20 COVID-19 Ambulatory (Ordered)   06/12/20 Rule-Out Test Resulted            Nurse Assessment:  Last Vital Signs: BP (!) 138/93   Pulse 83   Temp 99.2 °F (37.3 °C) (Oral)   Resp 20   Ht 5' 11\" (1.803 m)   Wt 299 lb 6.2 oz (135.8 kg)   SpO2 97%   BMI 41.76 kg/m²     Last documented pain score (0-10 scale): Pain Level: 6  Last Weight:   Wt Readings from Last 1 Encounters:   09/28/20 299 lb 6.2 oz (135.8 kg)     Mental Status:  oriented, alert, coherent, logical, thought processes intact and able to concentrate and follow conversation    IV Access:  - None    Nursing Mobility/ADLs:  Walking   Assisted  Transfer  Assisted  Bathing  Assisted  Dressing  Assisted  Toileting [address]  Independent  Med Delivery   none    Wound Care Documentation and Therapy:        Elimination:  Continence:   · Bowel: Yes  · Bladder: Yes  Urinary Catheter: None   Colostomy/Ileostomy/Ileal Conduit: Yes  Colostomy RUQ Transverse-Stomal Appliance: 2 piece, Clean, Dry, Intact, Changed  Colostomy RUQ Transverse-Flange Size (inches): 2.25 Inches  Colostomy RUQ Transverse-Stoma  Assessment: Red, Moist  Colostomy RUQ Transverse-Mucocutaneous Junction: Intact  Colostomy RUQ Transverse-Peristomal Assessment: Clean, Intact  Colostomy RUQ Transverse-Treatment: Liquid skin barrier, Bag change(barrier seal to cut edge of skin barrier)  Colostomy RUQ Transverse-Stool Appearance: Watery  Colostomy RUQ Transverse-Stool Color: Brown  Colostomy RUQ Transverse-Stool Amount: Medium  Colostomy RUQ Transverse-Output (mL): 100 ml    Date of Last BM: 10/2/2020    Intake/Output Summary (Last 24 hours) at 10/2/2020 1506  Last data filed at 10/2/2020 1454  Gross per 24 hour   Intake --   Output 1900 ml   Net -1900 ml     I/O last 3 completed shifts: In: 1990.6 [P.O.:270; I.V.:1720.6]  Out: 2310 [Urine:1450; Emesis/NG output:10; Stool:850]    Safety Concerns:     None    Impairments/Disabilities:      None    Nutrition Therapy:  Current Nutrition Therapy:   - Oral Diet:  Carb Control  - Oral Nutrition Supplement:  Diabetic  three times a day    Routes of Feeding: None  Liquids: No Restrictions  Daily Fluid Restriction: no  Last Modified Barium Swallow with Video (Video Swallowing Test): not done    Treatments at the Time of Hospital Discharge:   Respiratory Treatments: N/A  Oxygen Therapy:  is not on home oxygen therapy.   Ventilator:    - No ventilator support    Rehab Therapies: Physical Therapy and Occupational Therapy  Weight Bearing Status/Restrictions: No weight bearing restirctions  Other Medical Equipment (for information only, NOT a DME order):  wheelchair and walker  Other Treatments: Wound Care    Patient's personal belongings (please select all that are sent with patient):  None    RN SIGNATURE:  Electronically signed by Pippa Rodriguez RN on 10/2/20 at 3:09 PM EDT    CASE MANAGEMENT/SOCIAL WORK SECTION    Inpatient Status Date: 9/22/2020    Readmission Risk Assessment Score:  Readmission Risk              Risk of Unplanned Readmission:        31           Discharging to Facility/ Agency   · Name:    [facility]  · Address:     · Phone:   [phone]  · Fax:    [phone]    Dialysis Facility (if applicable)   · Name:  · Address:  · Dialysis Schedule:  · Phone:  · Fax:    / signature: Electronically signed by Alpesh Montaño RN on 10/2/20 at 2:20 PM EDT    PHYSICIAN SECTION    Prognosis: Good    Condition at Discharge: Stable    Rehab Potential (if transferring to Rehab): Good    Recommended Labs or Other Treatments After Discharge:see orders    Physician Certification: I certify the above information and transfer of Crise  II  is necessary for the continuing treatment of the diagnosis listed and that he requires Home Care for greater 30 days.      Update Admission H&P: No change in H&P    PHYSICIAN SIGNATURE:  Electronically signed by Ermalene Boeck, MD on 10/1/20 at 12:14 PM EDT

## 2020-09-28 NOTE — PROGRESS NOTES
Consulted for Loop Colostomy Care and Teaching              History: exploratory laparotomy, bowel resection, transverse loop colostomy, and LIOR drain placement on 9/23/2020.  Also, s/p exploratory laparotomy bowel resection of proximal jejunum and descending colon with mobilization of splenic flexure and primary anastomosis of small bowel and colon along with placement of gastrostomy tube on 9/2/2020.         09/28/20 1130   Colostomy RUQ Transverse   Placement Date/Time: 09/23/20 1872   Pre-existing: No  Timeout: Patient;Procedure;Site/Side;Appropriate Equipment; Consent Confirmed;Sterile Technique using full body drape;Site prepped with chlorhexidine;Correct Position  Inserted by: DR. Yo Goldberg  [facility]. .. Stomal Appliance 2 piece;Clean;Dry; Intact; Changed   Flange Size (inches) 2.25 Inches   Stoma  Assessment Moist;Red;Protrudes  (1 3/4\" circular) bridge sutures intact   Mucocutaneous Junction Intact   Peristomal Assessment Clean; Intact   Treatment Liquid skin barrier;Bag change  (barrier seal to cut edge of skin barrier)   Stool Appearance Watery   Stool Color Brown   Output (mL) 100 ml     Patient demonstrated emptying, pouch removal, cutting to fit. He requires assistance with pouch measuring and application due to habitus and debility. Plans C after discharge. Plan of care:   Reinforce steps of ostomy care. Encourage patient to empty own pouch. Ostomy care:  Cut barrier to fit stoma with no more than 1/8\" of exposed skin. Currently 1 3/4\" circular. Apply an [employer] Barrier Ring to the cut edge of the pouching system, prn  Empty the pouch when 1/3 to 1/2 full. Change the pouching system twice weekly and prn hygiene, leakage. Our goal is to keep the skin around the stoma intact and to have a dependable pouching system without leaks. The skin around the stoma should be intact and appear just like the skin on the opposite side of the abdomen.    Call the [address] [place] [address] at [phone] with questions or concerns.

## 2020-09-28 NOTE — PROGRESS NOTES
Renal Progress Note    Patient :  Jon Quiroz II; 56 y.o. MRN# [id number]  Location:  2168/0025-13  Attending:  Ermalene Boeck, MD  Admit Date:  9/22/2020   Hospital Day: 6      Subjective:     Pt seen and examined at bedside. Mild distress with pain, abdominal distention, tender to touch. Has colostomy bag in place with 875 ml output over 24 hrs.  UO 2 L over 24 hrs. Cr back to baseline, DEBBI resolved. K 3.5, replaced. Hb stable. Free k/lamda ratio normal. Normal complement levels. Sinus tachycardia resolved due to medications. Outpatient Medications:     Medications Prior to Admission: sodium bicarbonate 650 MG tablet, Take 1 tablet by mouth 2 times daily  gabapentin (NEURONTIN) 300 MG capsule, Take 1 capsule by mouth 3 times daily for 7 days. insulin glargine (LANTUS SOLOSTAR) 100 UNIT/ML injection pen, Inject 60 Units into the skin nightly  insulin lispro, 1 Unit Dial, (HUMALOG KWIKPEN) 100 UNIT/ML SOPN, Inject 0-6 Units into the skin 3 times daily (before meals) **Corrective Low Dose Algorithm** Glucose: Dose:              No Insulin 140-199 1 Unit 200-249 2 Units 250-299 3 Units 300-349 4 Units 350-399 5 Units Over 399 6 Units  ferrous sulfate (IRON 325) 325 (65 Fe) MG tablet, Take 1 tablet by mouth daily (with breakfast)  pantoprazole (PROTONIX) 40 MG tablet, Take 1 tablet by mouth daily  blood glucose monitor strips, Test 4 times a day & as needed for symptoms of irregular blood glucose. Dispense sufficient amount for indicated testing frequency plus additional to accommodate PRN testing needs.   Alcohol Swabs PADS, 1 each by Does not apply route 4 times daily  Insulin Pen Needle ([employer] PEN NEEDLES) 31G X 6 MM MISC, 1 each by Does not apply route daily  Lancets MISC, 1 each by Does not apply route 3 times daily  glucose monitoring kit (FREESTYLE) monitoring kit, 1 kit by Does not apply route daily  traMADol (ULTRAM) 50 MG tablet, Take 100 mg by mouth every 6 hours as needed for Pain.  Melatonin 10 MG TABS, Take 10 mg by mouth nightly as needed (SLEEP)  vitamin C (ASCORBIC ACID) 500 MG tablet, Take 500 mg by mouth daily  acetaminophen (TYLENOL) 500 MG tablet, Take 2 tablets by mouth every 6 hours as needed for Pain  cyclobenzaprine (FLEXERIL) 10 MG tablet, Take 1 tablet by mouth 3 times daily as needed for Muscle spasms (Patient taking differently: Take 10 mg by mouth 2 times daily as needed for Muscle spasms )  aspirin 81 MG tablet, Take 81 mg by mouth nightly   pravastatin (PRAVACHOL) 20 MG tablet, Take 20 mg by mouth daily  metoprolol (LOPRESSOR) 50 MG tablet, Take 50 mg by mouth 2 times daily  allopurinol (ZYLOPRIM) 100 MG tablet, Take 100 mg by mouth daily  zolpidem (AMBIEN) 10 MG tablet, Take by mouth nightly as needed for Sleep  hydrALAZINE (APRESOLINE) 25 MG tablet, Take 25 mg by mouth 3 times daily     Current Medications:     Scheduled Meds:    bisacodyl  10 mg Rectal Daily    dilTIAZem  60 mg Oral 2 times per day    metoprolol tartrate  100 mg Oral BID    methocarbamol  750 mg Oral Q6H    insulin lispro  0-18 Units Subcutaneous Q6H    pantoprazole  40 mg Oral QAM AC    sodium chloride flush  10 mL Intravenous 2 times per day    acetaminophen  1,000 mg Oral 3 times per day    lidocaine  2 patch Transdermal Daily     Continuous Infusions:    heparin (PORCINE) Infusion 18.9 Units/kg/hr (09/28/20 0544)    dextrose 5 % and 0.45 % NaCl 100 mL/hr (09/28/20 0137)    dextrose       PRN Meds:  HYDROmorphone, iohexol, ondansetron, heparin (porcine), heparin (porcine), metoprolol, oxyCODONE, sodium chloride flush, glucose, dextrose, glucagon (rDNA), dextrose    Input/Output:       I/O last 3 completed shifts: In: 3582.7 [P.O.:270; I.V.:3112.7; IV Piggyback:200]  Out: 2910 [Urine:2025; Emesis/NG output:10; Stool:875].       Patient Vitals for the past 96 hrs (Last 3 readings):   Weight   09/28/20 0508 299 lb 6.2 oz (135.8 kg)   09/27/20 0445 299 lb 9.7 oz (135.9 kg)   09/27/20 0430 299 lb 9.7 oz (135.9 kg)       Vital Signs:   Temperature:  Temp: 99.5 °F (37.5 °C)  TMax:   Temp (24hrs), Av.4 °F (36.9 °C), Min:97.6 °F (36.4 °C), Max:99.5 °F (37.5 °C)    Respirations:  Resp: 20  Pulse:   Pulse: 90  BP:    BP: (!) 141/86  BP Range: Systolic (35IOQ), JOT:608 , Min:125 , NYW:755       Diastolic (19ZUM), ZJO:92, Min:81, Max:94      Physical Examination:     General:  AAO x 3, speaking in full sentences, no accessory muscle use. Neck:   Supple  Chest:   Bilateral vesicular breath sounds diminished at bases, no rales or wheezes. Cardiac:  S1 S2 RR, no murmurs  Abdomen: Distended, tender to light palpation, unable to examine deep palpation, warm to touch, colostomy site tender, normal greenish stool seen, skin stretched over abdomen, hypoactive bowel sounds. :   No suprapubic or flank tenderness. Neuro:  AAO x 3, No FND. SKIN:  No rashes, good skin turgor. Extremities:  No edema. Labs:       Recent Labs     20  1339 20  0446 20  0645   WBC 12.8* 10.1 7.6   RBC 3.32* 3.34* 3.15*   HGB 9.1* 8.9* 9.1*   HCT 31.1* 29.9* 29.3*   MCV 93.7 89.5 93.0   MCH 27.4 26.6 28.9   MCHC 29.3 29.8 31.1   RDW 15.6* 15.4* 15.9*    310 492*   MPV 10.4 10.4 10.9      BMP:   Recent Labs     20  0446 20  1034 20  0453    137 139   K 3.8 3.4* 3.5*    108* 107   CO2 19* 19* 23   BUN 13 11 12   CREATININE 1.36* 1.29* 1.18   GLUCOSE 172* 179* 178*   CALCIUM 8.3* 8.3* 8.4*      Phosphorus:   No results for input(s): PHOS in the last 72 hours. Magnesium:  No results for input(s): MG in the last 72 hours. Albumin:  No results for input(s): LABALBU in the last 72 hours.   BNP:      Lab Results   Component Value Date    BNP 90 2012     FARIBA:    No results found for: FARIBA  SPEP:  Lab Results   Component Value Date    PROT 8.4 2020     UPEP:   No results found for: LABPE  C3:     Lab Results   Component Value Date    C3 102 2020     C4:     Lab Results   Component Value Date    C4 20 09/28/2020     MPO ANCA:   No results found for: MPO  PR3 ANCA:   No results found for: PR3  Anti-GBM:   No results found for: GBMABIGG  Hep BsAg:       No results found for: HEPBSAG  Hep C AB:        No results found for: HEPCAB    Urinalysis/Chemistries:      Lab Results   Component Value Date    NITRU NEGATIVE 09/22/2020    COLORU YELLOW 09/22/2020    PHUR 5.0 09/22/2020    WBCUA 10 TO 20 09/22/2020    RBCUA 2 TO 5 09/22/2020    MUCUS NOT REPORTED 09/22/2020    TRICHOMONAS NOT REPORTED 09/22/2020    YEAST NOT REPORTED 09/22/2020    BACTERIA NOT REPORTED 09/22/2020    SPECGRAV 1.009 09/22/2020    LEUKOCYTESUR MODERATE 09/22/2020    UROBILINOGEN Normal 09/22/2020    BILIRUBINUR NEGATIVE 09/22/2020    GLUCOSEU NEGATIVE 09/22/2020    KETUA NEGATIVE 09/22/2020    AMORPHOUS NOT REPORTED 09/22/2020     Urine Sodium:     Lab Results   Component Value Date    TRISH 64 12/11/2016     Urine Potassium:  No results found for: KUR  Urine Chloride:  No results found for: CLUR  Urine Osmolarity: No results found for: OSMOU  Urine Protein:   No components found for: TOTALPROTEIN, URINE   Urine Creatinine:     Lab Results   Component Value Date    LABCREA 121.6 06/22/2020     Urine Eosinophils:  No components found for: UEOS    Radiology:     Reviewed. Assessment:     1. DEBBI on CKD stage 3 with baseline 1.3-1.4 from longstanding DM2, HTN, acute insults from ATN over past few weeks secondary to hemodynamic instability, surgery, left nephrectomy, GI losses, right distal ureteral stone s/p cystoscopy and right ureteral stent placement. DEBBI resolved to baseline. 2. Longstanding DM2  3. Longstanding HTN  4. Recent right distal ureteral stone s/p cysto and right ureteral stent placement by Dr. uMmtaz Chavarria 9/1/20.  5. Recent a-fib with RVR now sinus tachycardia, preserved LVEF on recent echo. On heparin drip as per Cardio. 6.   POD5 s/p exploratory laparotomy, transverse loop colostomy and LIOR drain placement: Acute abdomen, planning CT abdomen with oral contrast.    Plan:   1. Decrease maintenance IV fluid rate to 50 ml/hr and would discontinue when taking good oral.  2. Optimize oral intake once okay with other services. 3. Agree with CT abdomen with oral contrast.  Please continue to try to avoid IV contrast.  4. Strict intake/output, document in the chart. 5. BMP in the am    Nutrition   Please ensure that patient is on a renal diet/TF. Avoid nephrotoxic drugs/contrast exposure. We will continue to follow along with you. Tammi Devi MD  Internal Medicine Residency, PGY-2  Nephrology Rotating Resident  9/28/2020 9:27 AM     Attending Physician Statement  I have discussed the care of Heena Pena II, including pertinent history and exam findings with the resident/fellow. I have reviewed the key elements of all parts of the encounter with the resident/fellow and have edited the documentation as appropriate. I have seen and examined the patient with the resident/fellow. I agree with the assessment and plan and status of the problem list as documented. Addiitionally I recommend as nothing further to add then Nephrology will sign off. Patient can call the office for a follow up appointment with me in 6-8 weeks.     Aniya Perez MD  Nephrology Attending Physician  [facility]

## 2020-09-29 ENCOUNTER — APPOINTMENT (OUTPATIENT)
Dept: INTERVENTIONAL RADIOLOGY/VASCULAR | Age: 60
DRG: 856 | End: 2020-09-29
Payer: COMMERCIAL

## 2020-09-29 PROBLEM — E44.0 MODERATE MALNUTRITION (HCC): Status: ACTIVE | Noted: 2020-09-07

## 2020-09-29 LAB
ANION GAP SERPL CALCULATED.3IONS-SCNC: 12 MMOL/L (ref 9–17)
BUN BLDV-MCNC: 10 MG/DL (ref 8–23)
BUN/CREAT BLD: ABNORMAL (ref 9–20)
CALCIUM SERPL-MCNC: 8.4 MG/DL (ref 8.6–10.4)
CHLORIDE BLD-SCNC: 106 MMOL/L (ref 98–107)
CO2: 20 MMOL/L (ref 20–31)
CREAT SERPL-MCNC: 1.09 MG/DL (ref 0.7–1.2)
GFR AFRICAN AMERICAN: >60 ML/MIN
GFR NON-AFRICAN AMERICAN: >60 ML/MIN
GFR SERPL CREATININE-BSD FRML MDRD: ABNORMAL ML/MIN/{1.73_M2}
GFR SERPL CREATININE-BSD FRML MDRD: ABNORMAL ML/MIN/{1.73_M2}
GLUCOSE BLD-MCNC: 156 MG/DL (ref 75–110)
GLUCOSE BLD-MCNC: 160 MG/DL (ref 75–110)
GLUCOSE BLD-MCNC: 164 MG/DL (ref 70–99)
GLUCOSE BLD-MCNC: 172 MG/DL (ref 75–110)
GLUCOSE BLD-MCNC: 178 MG/DL (ref 75–110)
GLUCOSE BLD-MCNC: 181 MG/DL (ref 75–110)
HCT VFR BLD CALC: 30.7 % (ref 40.7–50.3)
HEMOGLOBIN: 9.1 G/DL (ref 13–17)
INR BLD: 1.2
MCH RBC QN AUTO: 26.8 PG (ref 25.2–33.5)
MCHC RBC AUTO-ENTMCNC: 29.6 G/DL (ref 28.4–34.8)
MCV RBC AUTO: 90.3 FL (ref 82.6–102.9)
NRBC AUTOMATED: 0 PER 100 WBC
PARTIAL THROMBOPLASTIN TIME: 24 SEC (ref 20.5–30.5)
PARTIAL THROMBOPLASTIN TIME: 29.6 SEC (ref 20.5–30.5)
PARTIAL THROMBOPLASTIN TIME: 61.5 SEC (ref 20.5–30.5)
PDW BLD-RTO: 15.4 % (ref 11.8–14.4)
PLATELET # BLD: 317 K/UL (ref 138–453)
PMV BLD AUTO: 10.8 FL (ref 8.1–13.5)
POTASSIUM SERPL-SCNC: 3.7 MMOL/L (ref 3.7–5.3)
PROTHROMBIN TIME: 12.2 SEC (ref 9–12)
RBC # BLD: 3.4 M/UL (ref 4.21–5.77)
SODIUM BLD-SCNC: 138 MMOL/L (ref 135–144)
WBC # BLD: 8.7 K/UL (ref 3.5–11.3)

## 2020-09-29 PROCEDURE — 2580000003 HC RX 258: Performed by: STUDENT IN AN ORGANIZED HEALTH CARE EDUCATION/TRAINING PROGRAM

## 2020-09-29 PROCEDURE — 10030 IMG GID FLU COLL DRG SFT TIS: CPT

## 2020-09-29 PROCEDURE — 6370000000 HC RX 637 (ALT 250 FOR IP): Performed by: NURSE PRACTITIONER

## 2020-09-29 PROCEDURE — 36415 COLL VENOUS BLD VENIPUNCTURE: CPT

## 2020-09-29 PROCEDURE — 0W9G30Z DRAINAGE OF PERITONEAL CAVITY WITH DRAINAGE DEVICE, PERCUTANEOUS APPROACH: ICD-10-PCS | Performed by: RADIOLOGY

## 2020-09-29 PROCEDURE — 85610 PROTHROMBIN TIME: CPT

## 2020-09-29 PROCEDURE — 2709999900 HC NON-CHARGEABLE SUPPLY

## 2020-09-29 PROCEDURE — 6360000002 HC RX W HCPCS: Performed by: RADIOLOGY

## 2020-09-29 PROCEDURE — 82947 ASSAY GLUCOSE BLOOD QUANT: CPT

## 2020-09-29 PROCEDURE — 2060000000 HC ICU INTERMEDIATE R&B

## 2020-09-29 PROCEDURE — 85027 COMPLETE CBC AUTOMATED: CPT

## 2020-09-29 PROCEDURE — C1729 CATH, DRAINAGE: HCPCS

## 2020-09-29 PROCEDURE — C1769 GUIDE WIRE: HCPCS

## 2020-09-29 PROCEDURE — 6370000000 HC RX 637 (ALT 250 FOR IP): Performed by: STUDENT IN AN ORGANIZED HEALTH CARE EDUCATION/TRAINING PROGRAM

## 2020-09-29 PROCEDURE — 97535 SELF CARE MNGMENT TRAINING: CPT

## 2020-09-29 PROCEDURE — 80048 BASIC METABOLIC PNL TOTAL CA: CPT

## 2020-09-29 PROCEDURE — 6360000002 HC RX W HCPCS: Performed by: STUDENT IN AN ORGANIZED HEALTH CARE EDUCATION/TRAINING PROGRAM

## 2020-09-29 PROCEDURE — 85730 THROMBOPLASTIN TIME PARTIAL: CPT

## 2020-09-29 PROCEDURE — 6370000000 HC RX 637 (ALT 250 FOR IP): Performed by: INTERNAL MEDICINE

## 2020-09-29 PROCEDURE — 6360000002 HC RX W HCPCS: Performed by: INTERNAL MEDICINE

## 2020-09-29 PROCEDURE — 2580000003 HC RX 258: Performed by: NURSE PRACTITIONER

## 2020-09-29 RX ORDER — FENTANYL CITRATE 50 UG/ML
INJECTION, SOLUTION INTRAMUSCULAR; INTRAVENOUS
Status: COMPLETED | OUTPATIENT
Start: 2020-09-29 | End: 2020-09-29

## 2020-09-29 RX ADMIN — INSULIN LISPRO 3 UNITS: 100 INJECTION, SOLUTION INTRAVENOUS; SUBCUTANEOUS at 19:59

## 2020-09-29 RX ADMIN — HYDROMORPHONE HYDROCHLORIDE 0.5 MG: 1 INJECTION, SOLUTION INTRAMUSCULAR; INTRAVENOUS; SUBCUTANEOUS at 12:10

## 2020-09-29 RX ADMIN — HEPARIN SODIUM AND DEXTROSE 18.9 UNITS/KG/HR: 10000; 5 INJECTION INTRAVENOUS at 00:21

## 2020-09-29 RX ADMIN — HEPARIN SODIUM AND DEXTROSE 8.88 UNITS/KG/HR: 10000; 5 INJECTION INTRAVENOUS at 16:24

## 2020-09-29 RX ADMIN — ACETAMINOPHEN 1000 MG: 500 TABLET ORAL at 13:53

## 2020-09-29 RX ADMIN — OXYCODONE HYDROCHLORIDE 10 MG: 5 TABLET ORAL at 16:33

## 2020-09-29 RX ADMIN — METOPROLOL TARTRATE 100 MG: 50 TABLET, FILM COATED ORAL at 19:58

## 2020-09-29 RX ADMIN — ACETAMINOPHEN 1000 MG: 500 TABLET ORAL at 05:44

## 2020-09-29 RX ADMIN — SODIUM CHLORIDE, PRESERVATIVE FREE 10 ML: 5 INJECTION INTRAVENOUS at 20:16

## 2020-09-29 RX ADMIN — ACETAMINOPHEN 1000 MG: 500 TABLET ORAL at 22:19

## 2020-09-29 RX ADMIN — METHOCARBAMOL TABLETS 750 MG: 750 TABLET, COATED ORAL at 19:58

## 2020-09-29 RX ADMIN — METHOCARBAMOL TABLETS 750 MG: 750 TABLET, COATED ORAL at 14:34

## 2020-09-29 RX ADMIN — SODIUM CHLORIDE, PRESERVATIVE FREE 10 ML: 5 INJECTION INTRAVENOUS at 08:59

## 2020-09-29 RX ADMIN — DILTIAZEM HYDROCHLORIDE 60 MG: 60 TABLET, FILM COATED ORAL at 19:59

## 2020-09-29 RX ADMIN — OXYCODONE HYDROCHLORIDE 10 MG: 5 TABLET ORAL at 22:19

## 2020-09-29 RX ADMIN — DILTIAZEM HYDROCHLORIDE 60 MG: 60 TABLET, FILM COATED ORAL at 08:58

## 2020-09-29 RX ADMIN — PANTOPRAZOLE SODIUM 40 MG: 40 TABLET, DELAYED RELEASE ORAL at 05:44

## 2020-09-29 RX ADMIN — FENTANYL CITRATE 50 MCG: 50 INJECTION INTRAMUSCULAR; INTRAVENOUS at 10:22

## 2020-09-29 RX ADMIN — DEXTROSE AND SODIUM CHLORIDE 1000 ML: 5; 450 INJECTION, SOLUTION INTRAVENOUS at 17:01

## 2020-09-29 RX ADMIN — INSULIN LISPRO 3 UNITS: 100 INJECTION, SOLUTION INTRAVENOUS; SUBCUTANEOUS at 14:34

## 2020-09-29 RX ADMIN — BISACODYL 10 MG: 10 SUPPOSITORY RECTAL at 12:04

## 2020-09-29 RX ADMIN — METHOCARBAMOL TABLETS 750 MG: 750 TABLET, COATED ORAL at 08:58

## 2020-09-29 RX ADMIN — HYDROMORPHONE HYDROCHLORIDE 0.5 MG: 1 INJECTION, SOLUTION INTRAMUSCULAR; INTRAVENOUS; SUBCUTANEOUS at 20:01

## 2020-09-29 RX ADMIN — METHOCARBAMOL TABLETS 750 MG: 750 TABLET, COATED ORAL at 04:32

## 2020-09-29 RX ADMIN — HYDROMORPHONE HYDROCHLORIDE 0.5 MG: 1 INJECTION, SOLUTION INTRAMUSCULAR; INTRAVENOUS; SUBCUTANEOUS at 04:32

## 2020-09-29 RX ADMIN — HEPARIN SODIUM 4000 UNITS: 1000 INJECTION INTRAVENOUS; SUBCUTANEOUS at 23:45

## 2020-09-29 RX ADMIN — METOPROLOL TARTRATE 100 MG: 50 TABLET, FILM COATED ORAL at 08:58

## 2020-09-29 ASSESSMENT — PAIN DESCRIPTION - LOCATION
LOCATION: ABDOMEN

## 2020-09-29 ASSESSMENT — PAIN DESCRIPTION - DESCRIPTORS
DESCRIPTORS: ACHING;DISCOMFORT
DESCRIPTORS: ACHING
DESCRIPTORS: ACHING
DESCRIPTORS: DISCOMFORT

## 2020-09-29 ASSESSMENT — PAIN DESCRIPTION - ORIENTATION
ORIENTATION: LOWER

## 2020-09-29 ASSESSMENT — PAIN DESCRIPTION - PROGRESSION
CLINICAL_PROGRESSION: GRADUALLY WORSENING
CLINICAL_PROGRESSION: NOT CHANGED
CLINICAL_PROGRESSION: GRADUALLY WORSENING
CLINICAL_PROGRESSION: NOT CHANGED

## 2020-09-29 ASSESSMENT — PAIN SCALES - GENERAL
PAINLEVEL_OUTOF10: 8
PAINLEVEL_OUTOF10: 7
PAINLEVEL_OUTOF10: 7
PAINLEVEL_OUTOF10: 8
PAINLEVEL_OUTOF10: 7
PAINLEVEL_OUTOF10: 7
PAINLEVEL_OUTOF10: 8
PAINLEVEL_OUTOF10: 7
PAINLEVEL_OUTOF10: 4
PAINLEVEL_OUTOF10: 6
PAINLEVEL_OUTOF10: 5
PAINLEVEL_OUTOF10: 5
PAINLEVEL_OUTOF10: 0
PAINLEVEL_OUTOF10: 4
PAINLEVEL_OUTOF10: 5
PAINLEVEL_OUTOF10: 8
PAINLEVEL_OUTOF10: 7

## 2020-09-29 ASSESSMENT — PAIN DESCRIPTION - ONSET
ONSET: ON-GOING

## 2020-09-29 ASSESSMENT — PAIN - FUNCTIONAL ASSESSMENT
PAIN_FUNCTIONAL_ASSESSMENT: ACTIVITIES ARE NOT PREVENTED

## 2020-09-29 ASSESSMENT — PAIN DESCRIPTION - PAIN TYPE
TYPE: ACUTE PAIN

## 2020-09-29 ASSESSMENT — PAIN DESCRIPTION - FREQUENCY
FREQUENCY: CONTINUOUS

## 2020-09-29 NOTE — PLAN OF CARE
Nutrition Problem #1: Inadequate oral intake  Intervention: Food and/or Nutrient Delivery: Continue NPO(Monitor for restart of liquid diet. Provide ONS as able. If pt remains NPO/Clears, consider nutrition support.)  Nutritional Goals: Meet % of estimated nutrition needs once nutrition initiated.

## 2020-09-29 NOTE — PROGRESS NOTES
Occupational Therapy  Facility/Department: [facility] 4B STEPDOWN  Daily Treatment Note  NAME: Meaghan Ames II  : 1960  MRN: [id number]    Date of Service: 2020    Discharge Recommendations:  Patient would benefit from continued therapy after discharge       Assessment   Performance deficits / Impairments: Decreased functional mobility ; Decreased safe awareness;Decreased balance;Decreased ADL status; Decreased posture;Decreased endurance;Decreased high-level IADLs;Decreased strength  Treatment Diagnosis: Intra Abdominal Free Air  Prognosis: Good  REQUIRES OT FOLLOW UP: Yes  Activity Tolerance  Activity Tolerance: Patient limited by pain; Patient limited by fatigue  Safety Devices  Safety Devices in place: Yes  Type of devices: Call light within reach; Left in bed;Nurse notified         Patient Diagnosis(es): The encounter diagnosis was Free intraperitoneal air. has a past medical history of Back pain, Cellulitis, Colostomy RUQ, Gout, H/O left nephrectomy, HTN, and T2DM. has a past surgical history that includes Ankle surgery; knee surgery (Bilateral); Carpal tunnel release (Bilateral); Cystoscopy (Right, 2020); total nephrectomy (Left, 2020); Abdominal exploration surgery (2020); hc cath power picc triple (9/3/2020); Kidney surgery (Left, 2020); Small intestine surgery (N/A, 2020); Abdomen surgery (2020); and laparotomy (N/A, 2020).     Restrictions  Restrictions/Precautions  Restrictions/Precautions: Surgical Protocols, Fall Risk, Up as Tolerated  Required Braces or Orthoses?: Yes(abdominal binder)  Required Braces or Orthoses  Other: Abdominal Binder  Position Activity Restriction  Other position/activity restrictions: up with assistance  Subjective   General  Patient assessed for rehabilitation services?: Yes  Pain Assessment  Pain Assessment: 0-10  Pain Level: 7  Patient's Stated Pain Goal: No pain  Pain Type: Acute pain  Pain Location: Abdomen  Pain Orientation: Lower  Pain Descriptors: Aching  Pain Frequency: Continuous  Clinical Progression: Not changed  Response to Pain Intervention: Patient Satisfied  Vital Signs  Patient Currently in Pain: Yes      Objective    ADL  Feeding: Independent;Setup(NPO d/t recent procedure)  Grooming: Stand by assistance;Setup; Increased time to complete(washed face/hair, brushed teeth/hair)  UE Bathing: Minimal assistance;Setup; Increased time to complete(w/back)  LE Bathing: Moderate assistance;Setup; Increased time to complete(w/lower legs and feet)  UE Dressing: Minimal assistance;Setup; Increased time to complete(w/gown)  LE Dressing: Maximum assistance;Setup; Increased time to complete(w/footies)  Toileting: Stand by assistance(using urinal, has colostomy)      Pt in bed upon arrival. Pt states had procedure recently and having a lot of pain. Agreeable to do self care in bed. Setup at tray table for self care (see above for LOF). Pt remained in bed, call light and phone in reach. RN in room at end of tx.        Balance  Sitting Balance: Unable to assess(sitting in bed w/HOB elevated)  Standing Balance: Unable to assess(pt having too much pain to sit eob at this time)     Plan   Plan  Times per week: 3-4 x week    Goals  Short term goals  Time Frame for Short term goals: Pt will, by discharge:  Short term goal 1: Dem I with bed mobility  Short term goal 2: Dem cga for functional transfers  Short term goal 3: Dem cga for dynamic mobility for adl completion  Short term goal 4: dem good safety awareness tech for all transfers/mobility  Short term goal 5: Dem min a for adl performance  Short term goal 6: Dem a 10 min dynamic task with cga to increase activity tolerance     Therapy Time   Individual Concurrent Group Co-treatment   Time In  1545         Time Out  1627         Minutes  43 total tx time                 JAYDEN HUGGINS/CEDRIC

## 2020-09-29 NOTE — PLAN OF CARE
Problem: Pain:  Goal: Pain level will decrease  Description: Pain level will decrease  Outcome: Ongoing  Goal: Control of acute pain  Description: Control of acute pain  Outcome: Ongoing  Goal: Control of chronic pain  Description: Control of chronic pain  Outcome: Ongoing     Problem: Falls - Risk of:  Goal: Will remain free from falls  Description: Will remain free from falls  Outcome: Ongoing  Goal: Absence of physical injury  Description: Absence of physical injury  Outcome: Ongoing     Problem: Skin Integrity:  Goal: Will show no infection signs and symptoms  Description: Will show no infection signs and symptoms  Outcome: Ongoing  Goal: Absence of new skin breakdown  Description: Absence of new skin breakdown  Outcome: Ongoing  Goal: Risk for impaired skin integrity will decrease  Description: Risk for impaired skin integrity will decrease  Outcome: Ongoing     Problem: Discharge Planning:  Goal: Participates in care planning  Description: Participates in care planning  Outcome: Ongoing  Goal: Discharged to appropriate level of care  Description: Discharged to appropriate level of care  Outcome: Ongoing     Problem: Anxiety/Stress:  Goal: Level of anxiety will decrease  Description: Level of anxiety will decrease  Outcome: Ongoing     Problem: Aspiration:  Goal: Absence of aspiration  Description: Absence of aspiration  Outcome: Ongoing     Problem: Fluid Volume - Imbalance:  Goal: Absence of imbalanced fluid volume signs and symptoms  Description: Absence of imbalanced fluid volume signs and symptoms  Outcome: Ongoing     Problem: Sleep Pattern Disturbance:  Goal: Appears well-rested  Description: Appears well-rested  Outcome: Ongoing     Problem:  Bowel/Gastric:  Goal: Control of bowel function will improve  Description: Control of bowel function will improve  Outcome: Ongoing  Goal: Ability to achieve a regular elimination pattern will improve  Description: Ability to achieve a regular elimination pattern will improve  Outcome: Ongoing     Problem: Nutritional:  Goal: Ability to follow a diet with enough fiber (20 to 30 grams) for normal bowel function will improve  Description: Ability to follow a diet with enough fiber (20 to 30 grams) for normal bowel function will improve  Outcome: Ongoing

## 2020-09-29 NOTE — PROGRESS NOTES
Progress Note    9/29/2020   12:05 PM    Name:  Jorge Starkey  MRN:    [id number]     Acct:     [de-identified]   Room:  [address] [place] [address]  IP Day:   Progress Note    7     Admit Date: 9/22/2020  4:21 PM  PCP: Mimi Thompson MD    Subjective:     C/C:   Chief Complaint   Patient presents with    Abdominal Pain    Nausea       Interval History: Status: improved. Pt examined chart reviewed as above    had 5ml fluid drained in ir    He had no new complaints    overall slowly improving   denied fever chills etc  Ros  General no weight loss or fever  ent no trouble hearing tasting talking or swallowing  Cardiac no chest pain or dyspnea  Respiratory no cough or dyspnea  Gi [place]  gu [place]  Ortho no complaints of synovitis or decreased range of motion  Neuro no complaints of gait station or speech  Psych no complaints or nerves or memory  Vascular no claudication or stroke  Endo [place]  Heme no complaints of anemia or blood dyscrasias      Medications: Allergies: Allergies   Allergen Reactions    Ampicillin Swelling     Swelling of throat.  Pcn [Penicillins] Swelling     Throat swelling. Tolerated cefepime during 8/31/20 admission.     Tape [facility]        Current Meds: HYDROmorphone (DILAUDID) injection 0.5 mg, Q4H PRN  bisacodyl (DULCOLAX) suppository 10 mg, Daily  dilTIAZem (CARDIZEM) tablet 60 mg, 2 times per day  metoprolol tartrate (LOPRESSOR) tablet 100 mg, BID  ondansetron (ZOFRAN) injection 4 mg, Q6H PRN  [Held by provider] heparin (porcine) injection 4,000 Units, PRN  [Held by provider] heparin (porcine) injection 2,000 Units, PRN  [Held by provider] heparin 25,000 units in dextrose 5% 250 mL infusion, Continuous  metoprolol (LOPRESSOR) injection 5 mg, Q6H PRN  methocarbamol (ROBAXIN) tablet 750 mg, Q6H  insulin lispro (HUMALOG) injection vial 0-18 Units, Q6H  oxyCODONE (ROXICODONE) immediate release tablet 10 mg, Q4H PRN  dextrose 5 % and 0.45 % sodium chloride infusion, Continuous  pantoprazole (PROTONIX) tablet 40 mg, QAM AC  sodium chloride flush 0.9 % injection 10 mL, 2 times per day  sodium chloride flush 0.9 % injection 10 mL, PRN  acetaminophen (TYLENOL) tablet 1,000 mg, 3 times per day  lidocaine 4 % external patch 2 patch, Daily  glucose (GLUTOSE) 40 % oral gel 15 g, PRN  dextrose 50 % IV solution, PRN  glucagon (rDNA) injection 1 mg, PRN  dextrose 5 % solution, PRN        Data:     Code Status:  Full Code    Family History   Problem Relation Age of Onset    Stroke Maternal Grandmother     Stroke Maternal Grandfather        Social History     Socioeconomic History    Marital status:      Spouse name: Not on file    Number of children: Not on file    Years of education: Not on file    Highest education level: Not on file   Occupational History    Not on file   Social Needs    Financial resource strain: Not on file    Food insecurity     Worry: Not on file     Inability: Not on file    Transportation needs     Medical: Not on file     Non-medical: Not on file   Tobacco Use    Smoking status: Never Smoker    Smokeless tobacco: Never Used   Substance and Sexual Activity    Alcohol use:  Yes     Alcohol/week: 0.0 standard drinks     Comment: once a week    Drug use: No    Sexual activity: Not on file   Lifestyle    Physical activity     Days per week: Not on file     Minutes per session: Not on file    Stress: Not on file   Relationships    Social connections     Talks on phone: Not on file     Gets together: Not on file     Attends Judaism service: Not on file     Active member of club or organization: Not on file     Attends meetings of clubs or organizations: Not on file     Relationship status: Not on file    Intimate partner violence     Fear of current or ex partner: Not on file     Emotionally abused: Not on file     Physically abused: Not on file     Forced sexual activity: Not on file   Other Topics Concern    Not on file   Social History Narrative    Not on file Vitals:  BP (!) 101/52 Comment: resting comfortable  Pulse 79 Comment: resting comfortable  Temp 100 °F (37.8 °C) (Oral)   Resp 15 Comment: resting comfortable  Ht 5' 11\" (1.803 m)   Wt 254 lb 6.6 oz (115.4 kg)   SpO2 97% Comment: resting comfortable  BMI 35.48 kg/m²   Temp (24hrs), Av.2 °F (37.3 °C), Min:98.7 °F (37.1 °C), Max:100 °F (37.8 °C)    Recent Labs     20  1450 20  2119 20  0428 20  1154   POCGLU 169* 162* 160* 172*       I/O (24Hr): Intake/Output Summary (Last 24 hours) at 2020 1205  Last data filed at 2020 0438  Gross per 24 hour   Intake 2964.66 ml   Output 2840 ml   Net 124.66 ml       Labs:  Daily Labs for 3 Days:    Hematology:  Recent Labs     20  0645 20  0542   WBC 7.6 8.7   HGB 9.1* 9.1*   HCT 29.3* 30.7*   * 317   INR  --  1.2     Chemistry:  Recent Labs     20  1034 20  0453 20  0542    139 138   K 3.4* 3.5* 3.7   * 107 106   CO2 19* 23 20   GLUCOSE 179* 178* 164*   BUN 11 12 10   CREATININE 1.29* 1.18 1.09   CALCIUM 8.3* 8.4* 8.4*     No results for input(s): PROT, LABALBU, LABA1C, W2TQBWB, S3MECGY, FT4, TSH, AST, ALT, LDH, GGT, ALKPHOS, BILITOT, BILIDIR, AMMONIA, AMYLASE, LIPASE, LACTATE, CHOL, HDL, LDLCHOLESTEROL, CHOLHDLRATIO, TRIG, VLDL, BNP, TROPONINI, CKTOTAL, CKMB, CKMBINDEX in the last 72 hours.     paraclinics reviewed as posted  Physical Examination:      General appearance: alert, cooperative and no distress  Mental Status: oriented to person, place and time and normal affect  Lungs: clear to auscultation bilaterally, normal effort  Heart: regular rate and rhythm, no murmur,  Abdomen: soft, nontender, nondistended, bowel sounds present all four quadrants, no masses, hepatomegaly or splenomegaly  abdominal exam  less tense and less tender  Extremities: no edema, redness or tenderness in the calves  Skin: no gross lesions, rashes, or induration  ent perrla with eomi conjunctivae pink sclerae clear  Neuro-oriented and alert cn2-12 intact cerebral and cerebellar intact reflexes +2/4 bilaterally  Vascular  Good dp tp carotids  Ortho- no masses or synovitis  Good rom  Lymphatics none palpated in cervical supraclavicular axillary  Or inguinal area    Assessment:        Primary Problem  <principal problem not specified>   pneumoperitoneum  dm2  Abscess drainage  htn   juliaan on cki    There are no active hospital problems to display for this patient. Past Medical History:   Diagnosis Date    Back pain     Cellulitis     Colostomy RUQ 09/23/2020    Gout     H/O left nephrectomy 09/02/2020    HTN     T2DM         Plan:      1.  Cont same plan of care  2.  agree with consultants      Electronically signed by Quan Anderson MD on 9/29/2020 at 12:05 PM

## 2020-09-29 NOTE — PROGRESS NOTES
General Surgery:  Daily Progress Note          POD # 6 s/p exploratory laparotomy, transverse loop colostomy and LIOR drain placement           PATIENT NAME: Rm Hammer II     TODAY'S DATE: 9/29/2020, 5:07 AM  CC:  Abdominal pain    SUBJECTIVE:     Pt seen and examined at bedside. No acute events overnight. Patient reports continued diffuse abdominal discomfort. Good ostomy output, good urine output. N.p.o. since midnight for planned IR procedure. Denies N/V, F/C, SOB, CP. I-S to 2000    Tmax 99.6      OBJECTIVE:   VITALS:  BP (!) 145/95   Pulse 93   Temp 98.7 °F (37.1 °C) (Oral)   Resp 23   Ht 5' 11\" (1.803 m)   Wt 254 lb 6.6 oz (115.4 kg)   SpO2 97%   BMI 35.48 kg/m²      INTAKE/OUTPUT:      Intake/Output Summary (Last 24 hours) at 9/29/2020 0507  Last data filed at 9/29/2020 0438  Gross per 24 hour   Intake 2964.66 ml   Output 2940 ml   Net 24.66 ml       PHYSICAL EXAM:  General Appearance: awake, alert, oriented, in no acute distress  HEENT:  Normocephalic, atraumatic, mucus membranes moist   Heart: RRR, well-perfused   Lungs: Normal expansion.  Unlabored breathing  Abdomen: Obese, soft, moderate distention, diffuse TTP throughout.  LIOR drain right lower quadrant with minimal serous fluid. Gastrostomy tube in place, Midline surgical incision approximated with staples, covered in C/D/I dressings with mild amount of serous drainage    Extremities: No cyanosis, pitting edema, rashes noted.    Skin: Skin color, texture, turgor normal. No rashes or lesions.       Data:  CBC with Differential:    Lab Results   Component Value Date    WBC 7.6 09/27/2020    RBC 3.15 09/27/2020    HGB 9.1 09/27/2020    HCT 29.3 09/27/2020     09/27/2020    MCV 93.0 09/27/2020    MCH 28.9 09/27/2020    MCHC 31.1 09/27/2020    RDW 15.9 09/27/2020    LYMPHOPCT 10 09/26/2020    MONOPCT 9 09/26/2020    BASOPCT 0 09/26/2020    MONOSABS 0.95 09/26/2020    LYMPHSABS 0.97 09/26/2020    EOSABS 0.76 09/26/2020    BASOSABS 0.03 09/26/2020    DIFFTYPE NOT REPORTED 09/26/2020     BMP:    Lab Results   Component Value Date     09/28/2020    K 3.5 09/28/2020     09/28/2020    CO2 23 09/28/2020    BUN 12 09/28/2020    LABALBU 3.1 09/22/2020    CREATININE 1.18 09/28/2020    CALCIUM 8.4 09/28/2020    GFRAA >60 09/28/2020    LABGLOM >60 09/28/2020    GLUCOSE 178 09/28/2020     PT/INR:  No results found for: PROTIME, INR  PTT:    Lab Results   Component Value Date    APTT 41.1 09/28/2020   [APTT}      ASSESSMENT:    56 y.o. male with is POD# 6 s/p exploratory laparotomy, transverse loop colostomy and LIOR drain placement    Plan:  1. IR to place drain in fluid collection today  2. Heparin held at 6 AM; plan to restart Eliquis tomorrow 9/30  3. N.p.o. at midnight  4. Pain: MMT  5. GI- rec to cont Gastrostomy tube to gravity, monitor ostomy output,   6.  Activity:  Continue to encourage ambulation/activity w/ PT/OT; Continue to encourage incentive spirometry and deep breathing, I-S to 2000  7. Wound care: daily dressing change with midline  dry fluffs/ABD overtop as needed  8.  Continue/appreaciate medical management per primary/CC    Electronically signed by Brandon Simeon DO  on 9/29/2020 at 5:07 AM

## 2020-09-29 NOTE — PROGRESS NOTES
Comprehensive Nutrition Assessment    Type and Reason for Visit:  Initial(LOS)    Nutrition Recommendations/Plan:   - Continue NPO - monitor for restart of liquid diet and for diet advancement. Will provide ONS with meals as able. - If pt remains NPO/on Clears, consider nutrition support to meet estimated nutrition needs. - Will monitor labs, colostomy output, and plan of care. Nutrition Assessment:  Pt seen based on length of stay. Pt NPO currently for drain placement in IR today d/t fluid collection. Pt known to writer from previous admission - recent left nephrectomy and bowel surgery for renal mass and SBO. Medical hx of DM, CKD, and HTN. Admitted with c/o abd pain, nausea, and episode of vomiting. Pt with pneumoperitoneum and colo-colonic anastomic leak present - s/p ex lap, JOSE, abd lavage, and diverting proximal transverse loop colostomy. Pt has been on NPO/Clears since admission - planned restart of liquids later today. Pt reports tolerating liquids but having a decreased appetite. Noted pt states appetite has been decreased since previous admission. Will provide ONS as able. Malnutrition Assessment:  Malnutrition Status: Moderate malnutrition    Context:  Acute Illness     Findings of the 6 clinical characteristics of malnutrition:  Energy Intake:  7 - 50% or less of estimated energy requirements for 5 or more days  Weight Loss:  7 - Greater than 5% over 1 month(7.6% x 1 month per EHR review)     Body Fat Loss:  Unable to assess     Muscle Mass Loss:  Unable to assess    Fluid Accumulation:  No significant fluid accumulation     Strength:  Not Performed    Estimated Daily Nutrient Needs:  Energy (kcal):  25-28 kcal/kg = 0480-7992 kcals/day; Weight Used for Energy Requirements:  Ideal     Protein (g):  1.2-1.5 gm/kg =  gm pro/day; Weight Used for Protein Requirements:  Ideal         Nutrition Related Findings:  G-tube. Colostomy with output. Labs/Meds reviewed.       Wounds: Surgical Wound(Incision to abdomen.)       Current Nutrition Therapies:    Diet NPO, After Midnight Exceptions are: Sips with Meds  Additional Calorie Sources:   D5%0.45%NaCl at 100 mL/hr = 408 kcals/day    Anthropometric Measures:  · Height: 5' 11\" (180.3 cm)  · Current Body Weight: 254 lb 6.6 oz (115.4 kg)   · Admission Body Weight: 263 lb 14.3 oz (119.7 kg)    · Usual Body Weight: 275 lb (124.7 kg)(9/1/20)     · Ideal Body Weight: 172 lbs; % Ideal Body Weight 147.9 %   · BMI: 35.5  · BMI Categories: Obese Class 2 (BMI 35.0 -39.9)       Nutrition Diagnosis:   · Inadequate oral intake related to altered GI function(recent GI surgery) as evidenced by NPO or clear liquid status due to medical condition(x 6 days)    Nutrition Interventions:   Food and/or Nutrient Delivery:  Continue NPO(Monitor for restart of liquid diet. Provide ONS as able. If pt remains NPO/Clears, consider nutrition support.)  Nutrition Education/Counseling:  No recommendation at this time   Coordination of Nutrition Care:  Continued Inpatient Monitoring    Goals: Goal Set  Meet % of estimated nutrition needs once nutrition initiated. Nutrition Monitoring and Evaluation:   Behavioral-Environmental Outcomes:  (N/A)   Food/Nutrient Intake Outcomes:  Diet Advancement/Tolerance, IVF Intake  Physical Signs/Symptoms Outcomes:  Biochemical Data, GI Status, Nausea or Vomiting, Hemodynamic Status, Skin, Weight, Nutrition Focused Physical Findings     Discharge Planning:     Too soon to determine     Electronically signed by Fadia Torres RD, LD on 9/29/20 at 11:43 AM EDT    Contact: [phone]

## 2020-09-29 NOTE — PLAN OF CARE
Problem: Pain:  Goal: Pain level will decrease  Description: Pain level will decrease  9/29/2020 1030 by Solomon Ng RN  Outcome: Ongoing  9/29/2020 0409 by Rocco Krishna RN  Outcome: Ongoing  Goal: Control of acute pain  Description: Control of acute pain  9/29/2020 1030 by Solomon Ng RN  Outcome: Ongoing  9/29/2020 0409 by Rocco Krishna RN  Outcome: Ongoing  Goal: Control of chronic pain  Description: Control of chronic pain  9/29/2020 1030 by Solomon Ng RN  Outcome: Ongoing  9/29/2020 0409 by Rocco Krishna RN  Outcome: Ongoing     Problem: Falls - Risk of:  Goal: Will remain free from falls  Description: Will remain free from falls  9/29/2020 1030 by Solomon gN RN  Outcome: Ongoing  9/29/2020 0409 by Rocco Krishna RN  Outcome: Ongoing  Goal: Absence of physical injury  Description: Absence of physical injury  9/29/2020 1030 by Solomon Ng RN  Outcome: Ongoing  9/29/2020 0409 by Rocco Krishna RN  Outcome: Ongoing     Problem: Skin Integrity:  Goal: Will show no infection signs and symptoms  Description: Will show no infection signs and symptoms  9/29/2020 1030 by Solomon Ng RN  Outcome: Ongoing  9/29/2020 0409 by Rocco Krishna RN  Outcome: Ongoing  Goal: Absence of new skin breakdown  Description: Absence of new skin breakdown  9/29/2020 1030 by Solomon Ng RN  Outcome: Ongoing  9/29/2020 0409 by Rocco Krishna RN  Outcome: Ongoing  Goal: Risk for impaired skin integrity will decrease  Description: Risk for impaired skin integrity will decrease  9/29/2020 1030 by Solomon Ng RN  Outcome: Ongoing  9/29/2020 0409 by Rocco Krishna RN  Outcome: Ongoing     Problem: Discharge Planning:  Goal: Participates in care planning  Description: Participates in care planning  9/29/2020 1030 by Solomon Ng RN  Outcome: Ongoing  9/29/2020 0409 by Rocco Krishna RN  Outcome: Ongoing  Goal: Discharged to appropriate level of care  Description: Discharged to appropriate level of care  9/29/2020 1030 by Jovani Tariq RN  Outcome: Ongoing  9/29/2020 0409 by Rebekah Greer RN  Outcome: Ongoing     Problem: Anxiety/Stress:  Goal: Level of anxiety will decrease  Description: Level of anxiety will decrease  9/29/2020 1030 by Jovani Tariq RN  Outcome: Ongoing  9/29/2020 0409 by Rebekah Greer RN  Outcome: Ongoing     Problem: Aspiration:  Goal: Absence of aspiration  Description: Absence of aspiration  9/29/2020 1030 by Jovani Tariq RN  Outcome: Ongoing  9/29/2020 0409 by Rebekah Greer RN  Outcome: Ongoing     Problem: Fluid Volume - Imbalance:  Goal: Absence of imbalanced fluid volume signs and symptoms  Description: Absence of imbalanced fluid volume signs and symptoms  9/29/2020 1030 by Jovani Tariq RN  Outcome: Ongoing  9/29/2020 0409 by Rebekah Greer RN  Outcome: Ongoing     Problem: Sleep Pattern Disturbance:  Goal: Appears well-rested  Description: Appears well-rested  9/29/2020 1030 by Jovani Tariq RN  Outcome: Ongoing  9/29/2020 0409 by Rebekah Greer RN  Outcome: Ongoing     Problem:  Bowel/Gastric:  Goal: Control of bowel function will improve  Description: Control of bowel function will improve  9/29/2020 1030 by Jovani Tariq RN  Outcome: Ongoing  9/29/2020 0409 by Rebekah Greer RN  Outcome: Ongoing  Goal: Ability to achieve a regular elimination pattern will improve  Description: Ability to achieve a regular elimination pattern will improve  9/29/2020 1030 by Jovani Tariq RN  Outcome: Ongoing  9/29/2020 0409 by Rebekah Greer RN  Outcome: Ongoing     Problem: Nutritional:  Goal: Ability to follow a diet with enough fiber (20 to 30 grams) for normal bowel function will improve  Description: Ability to follow a diet with enough fiber (20 to 30 grams) for normal bowel function will improve  9/29/2020 1030 by Jovani Tariq RN  Outcome: Ongoing  9/29/2020 0409 by Rebekah Greer RN  Outcome: Ongoing

## 2020-09-29 NOTE — BRIEF OP NOTE
Brief Postoperative Note    Dutch Cadena II  YOB: 1960  [id number]    Pre-operative Diagnosis: Abdominal abscess    Post-operative Diagnosis: Same    Procedure: Abscess drain    Anesthesia: Local    Surgeons/Assistants: Amparo Lester MD    Estimated Blood Loss: less than 50     Complications: None    Specimens: Was Obtained:     Findings: Successful placement of 6 fr pigtail catheter. Approximately 5 mL of yellow fluid was aspirated. Drain sutured in place and stay fixed applied.      Electronically signed by JARAD Mendosa on 9/29/2020 at 10:39 AM

## 2020-09-30 LAB
ABSOLUTE EOS #: 0.45 K/UL (ref 0–0.44)
ABSOLUTE IMMATURE GRANULOCYTE: 0.12 K/UL (ref 0–0.3)
ABSOLUTE LYMPH #: 1.84 K/UL (ref 1.1–3.7)
ABSOLUTE MONO #: 0.83 K/UL (ref 0.1–1.2)
ANION GAP SERPL CALCULATED.3IONS-SCNC: 10 MMOL/L (ref 9–17)
BASOPHILS # BLD: 0 % (ref 0–2)
BASOPHILS ABSOLUTE: 0.03 K/UL (ref 0–0.2)
BUN BLDV-MCNC: 9 MG/DL (ref 8–23)
BUN/CREAT BLD: ABNORMAL (ref 9–20)
CALCIUM SERPL-MCNC: 8.3 MG/DL (ref 8.6–10.4)
CHLORIDE BLD-SCNC: 106 MMOL/L (ref 98–107)
CO2: 22 MMOL/L (ref 20–31)
CREAT SERPL-MCNC: 1.13 MG/DL (ref 0.7–1.2)
DIFFERENTIAL TYPE: ABNORMAL
EOSINOPHILS RELATIVE PERCENT: 6 % (ref 1–4)
GFR AFRICAN AMERICAN: >60 ML/MIN
GFR NON-AFRICAN AMERICAN: >60 ML/MIN
GFR SERPL CREATININE-BSD FRML MDRD: ABNORMAL ML/MIN/{1.73_M2}
GFR SERPL CREATININE-BSD FRML MDRD: ABNORMAL ML/MIN/{1.73_M2}
GLUCOSE BLD-MCNC: 140 MG/DL (ref 70–99)
GLUCOSE BLD-MCNC: 145 MG/DL (ref 75–110)
GLUCOSE BLD-MCNC: 146 MG/DL (ref 75–110)
GLUCOSE BLD-MCNC: 151 MG/DL (ref 75–110)
GLUCOSE BLD-MCNC: 152 MG/DL (ref 75–110)
GLUCOSE BLD-MCNC: 156 MG/DL (ref 75–110)
HCT VFR BLD CALC: 29.7 % (ref 40.7–50.3)
HEMOGLOBIN: 9 G/DL (ref 13–17)
IMMATURE GRANULOCYTES: 2 %
LYMPHOCYTES # BLD: 23 % (ref 24–43)
MCH RBC QN AUTO: 27.1 PG (ref 25.2–33.5)
MCHC RBC AUTO-ENTMCNC: 30.3 G/DL (ref 28.4–34.8)
MCV RBC AUTO: 89.5 FL (ref 82.6–102.9)
MONOCYTES # BLD: 11 % (ref 3–12)
NRBC AUTOMATED: 0 PER 100 WBC
PARTIAL THROMBOPLASTIN TIME: 31.1 SEC (ref 20.5–30.5)
PARTIAL THROMBOPLASTIN TIME: 38.3 SEC (ref 20.5–30.5)
PARTIAL THROMBOPLASTIN TIME: 41.9 SEC (ref 20.5–30.5)
PDW BLD-RTO: 15.4 % (ref 11.8–14.4)
PLATELET # BLD: 290 K/UL (ref 138–453)
PLATELET ESTIMATE: ABNORMAL
PMV BLD AUTO: 10.7 FL (ref 8.1–13.5)
POTASSIUM SERPL-SCNC: 3.4 MMOL/L (ref 3.7–5.3)
RBC # BLD: 3.32 M/UL (ref 4.21–5.77)
RBC # BLD: ABNORMAL 10*6/UL
SEG NEUTROPHILS: 58 % (ref 36–65)
SEGMENTED NEUTROPHILS ABSOLUTE COUNT: 4.58 K/UL (ref 1.5–8.1)
SODIUM BLD-SCNC: 138 MMOL/L (ref 135–144)
WBC # BLD: 7.9 K/UL (ref 3.5–11.3)
WBC # BLD: ABNORMAL 10*3/UL

## 2020-09-30 PROCEDURE — 85730 THROMBOPLASTIN TIME PARTIAL: CPT

## 2020-09-30 PROCEDURE — 6370000000 HC RX 637 (ALT 250 FOR IP): Performed by: INTERNAL MEDICINE

## 2020-09-30 PROCEDURE — 6360000002 HC RX W HCPCS: Performed by: INTERNAL MEDICINE

## 2020-09-30 PROCEDURE — 2580000003 HC RX 258: Performed by: NURSE PRACTITIONER

## 2020-09-30 PROCEDURE — 97116 GAIT TRAINING THERAPY: CPT

## 2020-09-30 PROCEDURE — 2060000000 HC ICU INTERMEDIATE R&B

## 2020-09-30 PROCEDURE — 6370000000 HC RX 637 (ALT 250 FOR IP): Performed by: STUDENT IN AN ORGANIZED HEALTH CARE EDUCATION/TRAINING PROGRAM

## 2020-09-30 PROCEDURE — 97110 THERAPEUTIC EXERCISES: CPT

## 2020-09-30 PROCEDURE — 6370000000 HC RX 637 (ALT 250 FOR IP): Performed by: NURSE PRACTITIONER

## 2020-09-30 PROCEDURE — 80048 BASIC METABOLIC PNL TOTAL CA: CPT

## 2020-09-30 PROCEDURE — 82947 ASSAY GLUCOSE BLOOD QUANT: CPT

## 2020-09-30 PROCEDURE — 85025 COMPLETE CBC W/AUTO DIFF WBC: CPT

## 2020-09-30 PROCEDURE — 36415 COLL VENOUS BLD VENIPUNCTURE: CPT

## 2020-09-30 PROCEDURE — 2580000003 HC RX 258: Performed by: STUDENT IN AN ORGANIZED HEALTH CARE EDUCATION/TRAINING PROGRAM

## 2020-09-30 PROCEDURE — 99211 OFF/OP EST MAY X REQ PHY/QHP: CPT

## 2020-09-30 PROCEDURE — 6360000002 HC RX W HCPCS: Performed by: STUDENT IN AN ORGANIZED HEALTH CARE EDUCATION/TRAINING PROGRAM

## 2020-09-30 RX ORDER — POTASSIUM CHLORIDE 20 MEQ/1
40 TABLET, EXTENDED RELEASE ORAL ONCE
Status: COMPLETED | OUTPATIENT
Start: 2020-10-01 | End: 2020-09-30

## 2020-09-30 RX ADMIN — BISACODYL 10 MG: 10 SUPPOSITORY RECTAL at 09:51

## 2020-09-30 RX ADMIN — ACETAMINOPHEN 1000 MG: 500 TABLET ORAL at 22:58

## 2020-09-30 RX ADMIN — OXYCODONE HYDROCHLORIDE 10 MG: 5 TABLET ORAL at 22:59

## 2020-09-30 RX ADMIN — METHOCARBAMOL TABLETS 750 MG: 750 TABLET, COATED ORAL at 20:44

## 2020-09-30 RX ADMIN — OXYCODONE HYDROCHLORIDE 10 MG: 5 TABLET ORAL at 14:32

## 2020-09-30 RX ADMIN — DEXTROSE AND SODIUM CHLORIDE 1000 ML: 5; 450 INJECTION, SOLUTION INTRAVENOUS at 13:29

## 2020-09-30 RX ADMIN — ACETAMINOPHEN 1000 MG: 500 TABLET ORAL at 13:29

## 2020-09-30 RX ADMIN — INSULIN LISPRO 3 UNITS: 100 INJECTION, SOLUTION INTRAVENOUS; SUBCUTANEOUS at 10:07

## 2020-09-30 RX ADMIN — SODIUM CHLORIDE, PRESERVATIVE FREE 10 ML: 5 INJECTION INTRAVENOUS at 09:52

## 2020-09-30 RX ADMIN — OXYCODONE HYDROCHLORIDE 10 MG: 5 TABLET ORAL at 05:27

## 2020-09-30 RX ADMIN — DILTIAZEM HYDROCHLORIDE 60 MG: 60 TABLET, FILM COATED ORAL at 09:50

## 2020-09-30 RX ADMIN — HEPARIN SODIUM 2000 UNITS: 1000 INJECTION INTRAVENOUS; SUBCUTANEOUS at 23:19

## 2020-09-30 RX ADMIN — INSULIN LISPRO 3 UNITS: 100 INJECTION, SOLUTION INTRAVENOUS; SUBCUTANEOUS at 14:52

## 2020-09-30 RX ADMIN — HYDROMORPHONE HYDROCHLORIDE 0.5 MG: 1 INJECTION, SOLUTION INTRAMUSCULAR; INTRAVENOUS; SUBCUTANEOUS at 02:40

## 2020-09-30 RX ADMIN — HEPARIN SODIUM 2000 UNITS: 1000 INJECTION INTRAVENOUS; SUBCUTANEOUS at 06:38

## 2020-09-30 RX ADMIN — METHOCARBAMOL TABLETS 750 MG: 750 TABLET, COATED ORAL at 09:51

## 2020-09-30 RX ADMIN — INSULIN LISPRO 3 UNITS: 100 INJECTION, SOLUTION INTRAVENOUS; SUBCUTANEOUS at 02:42

## 2020-09-30 RX ADMIN — METOPROLOL TARTRATE 100 MG: 50 TABLET, FILM COATED ORAL at 20:43

## 2020-09-30 RX ADMIN — ACETAMINOPHEN 1000 MG: 500 TABLET ORAL at 05:27

## 2020-09-30 RX ADMIN — HEPARIN SODIUM AND DEXTROSE 12.88 UNITS/KG/HR: 10000; 5 INJECTION INTRAVENOUS at 02:42

## 2020-09-30 RX ADMIN — DEXTROSE AND SODIUM CHLORIDE 1000 ML: 5; 450 INJECTION, SOLUTION INTRAVENOUS at 02:49

## 2020-09-30 RX ADMIN — HYDROMORPHONE HYDROCHLORIDE 0.5 MG: 1 INJECTION, SOLUTION INTRAMUSCULAR; INTRAVENOUS; SUBCUTANEOUS at 09:50

## 2020-09-30 RX ADMIN — METHOCARBAMOL TABLETS 750 MG: 750 TABLET, COATED ORAL at 02:40

## 2020-09-30 RX ADMIN — PANTOPRAZOLE SODIUM 40 MG: 40 TABLET, DELAYED RELEASE ORAL at 05:27

## 2020-09-30 RX ADMIN — INSULIN LISPRO 3 UNITS: 100 INJECTION, SOLUTION INTRAVENOUS; SUBCUTANEOUS at 20:44

## 2020-09-30 RX ADMIN — DEXTROSE AND SODIUM CHLORIDE 1000 ML: 5; 450 INJECTION, SOLUTION INTRAVENOUS at 23:04

## 2020-09-30 RX ADMIN — METHOCARBAMOL TABLETS 750 MG: 750 TABLET, COATED ORAL at 14:45

## 2020-09-30 RX ADMIN — DILTIAZEM HYDROCHLORIDE 60 MG: 60 TABLET, FILM COATED ORAL at 20:44

## 2020-09-30 RX ADMIN — POTASSIUM CHLORIDE 40 MEQ: 1500 TABLET, EXTENDED RELEASE ORAL at 23:52

## 2020-09-30 RX ADMIN — HYDROMORPHONE HYDROCHLORIDE 0.5 MG: 1 INJECTION, SOLUTION INTRAMUSCULAR; INTRAVENOUS; SUBCUTANEOUS at 20:44

## 2020-09-30 RX ADMIN — HEPARIN SODIUM AND DEXTROSE 16.87 UNITS/KG/HR: 10000; 5 INJECTION INTRAVENOUS at 19:09

## 2020-09-30 RX ADMIN — HEPARIN SODIUM 2000 UNITS: 1000 INJECTION INTRAVENOUS; SUBCUTANEOUS at 13:22

## 2020-09-30 RX ADMIN — METOPROLOL TARTRATE 100 MG: 50 TABLET, FILM COATED ORAL at 09:50

## 2020-09-30 ASSESSMENT — PAIN SCALES - GENERAL
PAINLEVEL_OUTOF10: 8
PAINLEVEL_OUTOF10: 7
PAINLEVEL_OUTOF10: 5
PAINLEVEL_OUTOF10: 8
PAINLEVEL_OUTOF10: 0
PAINLEVEL_OUTOF10: 7
PAINLEVEL_OUTOF10: 8
PAINLEVEL_OUTOF10: 5
PAINLEVEL_OUTOF10: 6
PAINLEVEL_OUTOF10: 8
PAINLEVEL_OUTOF10: 0
PAINLEVEL_OUTOF10: 7
PAINLEVEL_OUTOF10: 8
PAINLEVEL_OUTOF10: 7

## 2020-09-30 ASSESSMENT — PAIN DESCRIPTION - PAIN TYPE
TYPE: ACUTE PAIN

## 2020-09-30 ASSESSMENT — PAIN DESCRIPTION - LOCATION
LOCATION: ABDOMEN

## 2020-09-30 ASSESSMENT — PAIN DESCRIPTION - FREQUENCY
FREQUENCY: CONTINUOUS
FREQUENCY: CONTINUOUS

## 2020-09-30 ASSESSMENT — PAIN DESCRIPTION - PROGRESSION
CLINICAL_PROGRESSION: NOT CHANGED
CLINICAL_PROGRESSION: NOT CHANGED
CLINICAL_PROGRESSION: GRADUALLY IMPROVING
CLINICAL_PROGRESSION: NOT CHANGED

## 2020-09-30 ASSESSMENT — PAIN DESCRIPTION - DESCRIPTORS
DESCRIPTORS: ACHING
DESCRIPTORS: ACHING

## 2020-09-30 ASSESSMENT — PAIN DESCRIPTION - ORIENTATION
ORIENTATION: LOWER
ORIENTATION: LOWER
ORIENTATION: MID
ORIENTATION: LOWER

## 2020-09-30 ASSESSMENT — PAIN DESCRIPTION - ONSET
ONSET: ON-GOING
ONSET: ON-GOING

## 2020-09-30 NOTE — PROGRESS NOTES
Progress Note    9/30/2020   1:25 PM    Name:  Suzanna Amaya  MRN:    [id number]     Acct:     [de-identified]   Room:  [address] [place] [address] Day:   Progress Note    8     Admit Date: 9/22/2020  4:21 PM  PCP: [place] Millard MD    Subjective:     C/C:   Chief Complaint   Patient presents with    Abdominal Pain    Nausea       Interval History: Status: improved. Pt examined chart reviewed as above   status post colostomy and subsequent ir drainage of intraabdominal fluid collection abscess  Overall improved  Ros  General no weight loss or fever  ent no trouble hearing tasting talking or swallowing  Cardiac no chest pain or dyspnea  Respiratory no cough or dyspnea  [place]  Ortho no complaints of synovitis or decreased range of motion  Neuro no complaints of gait station or speech  Psych no complaints or nerves or memory  Vascular no claudication or stroke  Endo type 2 dm  Heme no complaints of anemia or blood dyscrasias      Medications: Allergies: Allergies   Allergen Reactions    Ampicillin Swelling     Swelling of throat.  Pcn [Penicillins] Swelling     Throat swelling. Tolerated cefepime during 8/31/20 admission.     Tape Opal Blonder Tape]        Current Meds: HYDROmorphone (DILAUDID) injection 0.5 mg, Q6H PRN  bisacodyl (DULCOLAX) suppository 10 mg, Daily  dilTIAZem (CARDIZEM) tablet 60 mg, 2 times per day  metoprolol tartrate (LOPRESSOR) tablet 100 mg, BID  ondansetron (ZOFRAN) injection 4 mg, Q6H PRN  heparin (porcine) injection 4,000 Units, PRN  heparin (porcine) injection 2,000 Units, PRN  heparin 25,000 units in dextrose 5% 250 mL infusion, Continuous  metoprolol (LOPRESSOR) injection 5 mg, Q6H PRN  methocarbamol (ROBAXIN) tablet 750 mg, Q6H  insulin lispro (HUMALOG) injection vial 0-18 Units, Q6H  oxyCODONE (ROXICODONE) immediate release tablet 10 mg, Q4H PRN  dextrose 5 % and 0.45 % sodium chloride infusion, Continuous  pantoprazole (PROTONIX) tablet 40 mg, QAM AC  sodium chloride flush 0.9 % injection 10 mL, 2 times per day  sodium chloride flush 0.9 % injection 10 mL, PRN  acetaminophen (TYLENOL) tablet 1,000 mg, 3 times per day  lidocaine 4 % external patch 2 patch, Daily  glucose (GLUTOSE) 40 % oral gel 15 g, PRN  dextrose 50 % IV solution, PRN  glucagon (rDNA) injection 1 mg, PRN  dextrose 5 % solution, PRN        Data:     Code Status:  Full Code    Family History   Problem Relation Age of Onset    Stroke Maternal Grandmother     Stroke Maternal Grandfather        Social History     Socioeconomic History    Marital status:      Spouse name: Not on file    Number of children: Not on file    Years of education: Not on file    Highest education level: Not on file   Occupational History    Not on file   Social Needs    Financial resource strain: Not on file    Food insecurity     Worry: Not on file     Inability: Not on file    Transportation needs     Medical: Not on file     Non-medical: Not on file   Tobacco Use    Smoking status: Never Smoker    Smokeless tobacco: Never Used   Substance and Sexual Activity    Alcohol use:  Yes     Alcohol/week: 0.0 standard drinks     Comment: once a week    Drug use: No    Sexual activity: Not on file   Lifestyle    Physical activity     Days per week: Not on file     Minutes per session: Not on file    Stress: Not on file   Relationships    Social connections     Talks on phone: Not on file     Gets together: Not on file     Attends [employer] service: Not on file     Active member of club or organization: Not on file     Attends meetings of clubs or organizations: Not on file     Relationship status: Not on file    Intimate partner violence     Fear of current or ex partner: Not on file     Emotionally abused: Not on file     Physically abused: Not on file     Forced sexual activity: Not on file   Other Topics Concern    Not on file   Social History Narrative    Not on file       Vitals:  BP (!) 136/94   Pulse 77   Temp 98.1 °F (36.7 °C) (Oral)   Resp 16   Ht 5' 11\" (1.803 m)   Wt 254 lb 6.6 oz (115.4 kg)   SpO2 96%   BMI 35.48 kg/m²   Temp (24hrs), Av.4 °F (36.9 °C), Min:98 °F (36.7 °C), Max:99.4 °F (37.4 °C)    Recent Labs     20  1628 20  1955 20  0241 20  1003   POCGLU 156* 178* 146* 152*       I/O (24Hr): Intake/Output Summary (Last 24 hours) at 2020 1325  Last data filed at 2020 0730  Gross per 24 hour   Intake 2243 ml   Output 1415 ml   Net 828 ml       Labs:  Daily Labs for 3 Days:    Hematology:  Recent Labs     20  0542 20  0548   WBC 8.7 7.9   HGB 9.1* 9.0*   HCT 30.7* 29.7*    290   INR 1.2  --      Chemistry:  Recent Labs     20  0453 20  0542 20  0548    138 138   K 3.5* 3.7 3.4*    106 106   CO2 23 20 22   GLUCOSE 178* 164* 140*   BUN 12 10 9   CREATININE 1.18 1.09 1.13   CALCIUM 8.4* 8.4* 8.3*     No results for input(s): PROT, LABALBU, LABA1C, O7FZNOY, E1AJMPR, FT4, TSH, AST, ALT, LDH, GGT, ALKPHOS, BILITOT, BILIDIR, AMMONIA, AMYLASE, LIPASE, LACTATE, CHOL, HDL, LDLCHOLESTEROL, CHOLHDLRATIO, TRIG, VLDL, BNP, TROPONINI, CKTOTAL, CKMB, CKMBINDEX in the last 72 hours.     paraclinics reviewed as posted  Physical Examination:      General appearance: alert, cooperative and no distress  Mental Status: oriented to person, place and time and normal affect  Lungs: clear to auscultation bilaterally, normal effort  Heart: regular rate and rhythm, no murmur,  Abdomen: soft, nontender, nondistended, bowel sounds present all four quadrants, no masses, hepatomegaly or splenomegaly  Extremities: no edema, redness or tenderness in the calves  Skin: no gross lesions, rashes, or induration  ent perrla with eomi conjunctivae pink sclerae clear  Neuro-oriented and alert cn2-12 intact cerebral and cerebellar intact reflexes +2/4 bilaterally  Vascular  Good dp tp carotids  Ortho- no masses or synovitis  Good rom  Lymphatics none palpated in cervical supraclavicular axillary  Or inguinal area    Assessment:        Primary Problem  <principal problem not specified>   dm2  colostomy placement  Status post nephrectomy  There are no active hospital problems to display for this patient. Past Medical History:   Diagnosis Date    Back pain     Cellulitis     Colostomy RUQ 09/23/2020    Gout     H/O left nephrectomy 09/02/2020    HTN     T2DM         Plan:        1. Cont same plan of care  2.  agree with consultants  3.  Slow steady progression      Electronically signed by Radha Dorman MD on 9/30/2020 at 1:25 PM

## 2020-09-30 NOTE — PLAN OF CARE
Problem: Pain:  Goal: Pain level will decrease  Description: Pain level will decrease  9/30/2020 1211 by Yennifer Montes RN  Outcome: Ongoing  9/30/2020 0437 by Yobani Ruiz RN  Outcome: Ongoing  Goal: Control of acute pain  Description: Control of acute pain  9/30/2020 1211 by Yennifer Montes RN  Outcome: Ongoing  9/30/2020 0437 by Yobani Ruiz RN  Outcome: Ongoing  Goal: Control of chronic pain  Description: Control of chronic pain  9/30/2020 1211 by Yennifer Montes RN  Outcome: Ongoing  9/30/2020 0437 by Yobani Ruiz RN  Outcome: Ongoing     Problem: Falls - Risk of:  Goal: Will remain free from falls  Description: Will remain free from falls  9/30/2020 1211 by Yennifer Montes RN  Outcome: Ongoing  9/30/2020 0437 by Yobani Ruiz RN  Outcome: Ongoing  Goal: Absence of physical injury  Description: Absence of physical injury  9/30/2020 1211 by Yennifer Montes RN  Outcome: Ongoing  9/30/2020 0437 by Yobani Ruiz RN  Outcome: Ongoing     Problem: Skin Integrity:  Goal: Will show no infection signs and symptoms  Description: Will show no infection signs and symptoms  9/30/2020 1211 by Yennifer Montes RN  Outcome: Ongoing  9/30/2020 0437 by Yobani Ruiz RN  Outcome: Ongoing  Goal: Absence of new skin breakdown  Description: Absence of new skin breakdown  9/30/2020 1211 by Yennifer Montes RN  Outcome: Ongoing  9/30/2020 0437 by Yobani Ruiz RN  Outcome: Ongoing  Goal: Risk for impaired skin integrity will decrease  Description: Risk for impaired skin integrity will decrease  9/30/2020 1211 by Yennifer Montes RN  Outcome: Ongoing  9/30/2020 0437 by Yobani Ruiz RN  Outcome: Ongoing     Problem: Discharge Planning:  Goal: Participates in care planning  Description: Participates in care planning  9/30/2020 1211 by Yennifer Montes RN  Outcome: Ongoing  9/30/2020 0437 by Yobani Ruiz RN  Outcome: Ongoing  Goal: Discharged to appropriate level of care  Description: Discharged to appropriate level of care  9/30/2020 1211 by Rosalio Zamudio RN  Outcome: Ongoing  9/30/2020 0437 by Avila Ruiz RN  Outcome: Ongoing     Problem: Anxiety/Stress:  Goal: Level of anxiety will decrease  Description: Level of anxiety will decrease  9/30/2020 1211 by Rosalio Zamudio RN  Outcome: Ongoing  9/30/2020 0437 by Avila Ruiz RN  Outcome: Ongoing     Problem: Aspiration:  Goal: Absence of aspiration  Description: Absence of aspiration  9/30/2020 1211 by Rosalio Zamudio RN  Outcome: Ongoing  9/30/2020 0437 by Avila Ruiz RN  Outcome: Ongoing     Problem: Fluid Volume - Imbalance:  Goal: Absence of imbalanced fluid volume signs and symptoms  Description: Absence of imbalanced fluid volume signs and symptoms  9/30/2020 1211 by Rosalio Zamudio RN  Outcome: Ongoing  9/30/2020 0437 by Avila Ruiz RN  Outcome: Ongoing     Problem: Sleep Pattern Disturbance:  Goal: Appears well-rested  Description: Appears well-rested  9/30/2020 1211 by Rosalio Zamudio RN  Outcome: Ongoing  9/30/2020 0437 by Avila Ruiz RN  Outcome: Ongoing     Problem:  Bowel/Gastric:  Goal: Control of bowel function will improve  Description: Control of bowel function will improve  9/30/2020 1211 by Rosalio Zamudio RN  Outcome: Ongoing  9/30/2020 0437 by Avila Ruiz RN  Outcome: Ongoing  Goal: Ability to achieve a regular elimination pattern will improve  Description: Ability to achieve a regular elimination pattern will improve  9/30/2020 1211 by Rosalio Zamudio RN  Outcome: Ongoing  9/30/2020 0437 by Avila Ruiz RN  Outcome: Ongoing     Problem: Nutritional:  Goal: Ability to follow a diet with enough fiber (20 to 30 grams) for normal bowel function will improve  Description: Ability to follow a diet with enough fiber (20 to 30 grams) for normal bowel function will improve  9/30/2020 1211 by Rosalio Zamudio RN  Outcome: Ongoing  9/30/2020 0437 by Avila Ruiz RN  Outcome: Ongoing     Problem: Nutrition  Goal: Optimal nutrition therapy  9/30/2020 1211 by Rosalio Zamudio RN  Outcome: Ongoing  9/30/2020 0437 by Patricia Ruano RN  Outcome: Ongoing

## 2020-09-30 NOTE — PROGRESS NOTES
General Surgery:  Daily Progress Note          POD # 7 s/p exploratory laparotomy, transverse loop colostomy and LIOR drain placement           PATIENT NAME: Meaghan Ames II     TODAY'S DATE: 9/30/2020, 5:30 AM  CC:  Abdominal pain    SUBJECTIVE:     Pt seen and examined at bedside. No acute events overnight. Patient reports continued but improved diffuse abdominal discomfort. Reports he feels less bloated since IR placed a drain yesterday. Good ostomy output, good urine output. Tolerating FLD, but reports was not sure if he should eat certain items because they did not seem to be consistent with a diabetic diet. Denies N/V, F/C, SOB, CP. I-S to 2000. Tmax 100.0      OBJECTIVE:   VITALS:  BP (!) 144/87   Pulse 87   Temp 98 °F (36.7 °C) (Oral)   Resp 18   Ht 5' 11\" (1.803 m)   Wt 254 lb 6.6 oz (115.4 kg)   SpO2 99%   BMI 35.48 kg/m²      INTAKE/OUTPUT:      Intake/Output Summary (Last 24 hours) at 9/30/2020 0530  Last data filed at 9/29/2020 1505  Gross per 24 hour   Intake 1763 ml   Output 1720 ml   Net 43 ml       PHYSICAL EXAM:  General Appearance: awake, alert, oriented, in no acute distress  HEENT:  Normocephalic, atraumatic, mucus membranes moist   Heart: RRR, well-perfused   Lungs: Normal expansion.  Unlabored breathing  Abdomen: Obese, soft, moderate distention, diffuse TTP throughout.  LIOR drain right lower quadrant with minimal serous fluid. LIOR drain to left lower quadrant with minimal sanguinous fluid. Gastrostomy tube in place, Midline surgical incision approximated with staples, covered in C/D/I dressings with mild amount of serous drainage.    Extremities: No cyanosis, pitting edema, rashes noted.    Skin: Skin color, texture, turgor normal. No rashes or lesions.       Data:  CBC with Differential:    Lab Results   Component Value Date    WBC 8.7 09/29/2020    RBC 3.40 09/29/2020    HGB 9.1 09/29/2020    HCT 30.7 09/29/2020     09/29/2020    MCV 90.3 09/29/2020    MCH 26.8 09/29/2020    MCHC 29.6 09/29/2020    RDW 15.4 09/29/2020    LYMPHOPCT 10 09/26/2020    MONOPCT 9 09/26/2020    BASOPCT 0 09/26/2020    MONOSABS 0.95 09/26/2020    LYMPHSABS 0.97 09/26/2020    EOSABS 0.76 09/26/2020    BASOSABS 0.03 09/26/2020    DIFFTYPE NOT REPORTED 09/26/2020     BMP:    Lab Results   Component Value Date     09/29/2020    K 3.7 09/29/2020     09/29/2020    CO2 20 09/29/2020    BUN 10 09/29/2020    LABALBU 3.1 09/22/2020    CREATININE 1.09 09/29/2020    CALCIUM 8.4 09/29/2020    GFRAA >60 09/29/2020    LABGLOM >60 09/29/2020    GLUCOSE 164 09/29/2020     PT/INR:    Lab Results   Component Value Date    PROTIME 12.2 09/29/2020    INR 1.2 09/29/2020     PTT:    Lab Results   Component Value Date    APTT 29.6 09/29/2020   [APTT}      ASSESSMENT:    56 y.o. male with is POD# 7 s/p exploratory laparotomy, transverse loop colostomy and LIOR drain placement    Plan:  1. IR placed LIOR drain with 25 mL sanguinous fluid out since placement  2. Okay to restart Eliquis from a surgical perspective  3. Advance diet to carb control  4. Pain: MMT  5. GI- rec to cont Gastrostomy tube to gravity, monitor ostomy output,   6.  Activity:  Continue to encourage ambulation/activity w/ PT/OT; Continue to encourage incentive spirometry and deep breathing, I-S to 2000  7. Wound care: daily dressing change with midline  dry fluffs/ABD overtop as needed  8. Continue/appreaciate medical management per primary/CC  9.      Electronically signed by Aileen Enrique DO  on 9/30/2020 at 5:30 AM

## 2020-09-30 NOTE — PROGRESS NOTES
Pouching system is intact. Patient demonstrated pouch emptying. Sample supplies left at bedside. Patient agreeable to demonstrating complete pouch change tomorrow.

## 2020-09-30 NOTE — PROGRESS NOTES
Physical Therapy  Facility/Department: [address] STEPDOWN  Daily Treatment Note  NAME: Eva Reyes II  : 1960  MRN: [id number]    Date of Service: 2020    Discharge Recommendations:  Patient would benefit from continued therapy after discharge   PT Equipment Recommendations  Equipment Needed: No    Assessment   Body structures, Functions, Activity limitations: Decreased functional mobility ; Decreased endurance;Decreased balance  Assessment: Pt required Kaila/CGA for mobility. able to amb 150ft with RW and CGA . Pain improving but still limiting mobility. The pt would continue to benefit from more PT to address  Prognosis: Good  PT Education: Plan of Care;General Safety;Gait Training;Functional Mobility Training;Home Exercise Program;Transfer Training  REQUIRES PT FOLLOW UP: Yes  Activity Tolerance  Activity Tolerance: Patient limited by endurance; Patient Tolerated treatment well     Patient Diagnosis(es): The encounter diagnosis was Free intraperitoneal air. has a past medical history of Back pain, Cellulitis, Colostomy RUQ, Gout, H/O left nephrectomy, HTN, and T2DM. has a past surgical history that includes Ankle surgery; knee surgery (Bilateral); Carpal tunnel release (Bilateral); Cystoscopy (Right, 2020); total nephrectomy (Left, 2020); Abdominal exploration surgery (2020); hc cath power picc triple (9/3/2020); Kidney surgery (Left, 2020); Small intestine surgery (N/A, 2020); Abdomen surgery (2020); and laparotomy (N/A, 2020). Restrictions  Restrictions/Precautions  Restrictions/Precautions: Surgical Protocols, Fall Risk, Up as Tolerated  Required Braces or Orthoses?: Yes(abdominal binder)  Required Braces or Orthoses  Other: Abdominal Binder  Position Activity Restriction  Other position/activity restrictions: up with assistance  Subjective   General  Response To Previous Treatment: Patient with no complaints from previous session.   Family / Caregiver 1: Indpendent bed mobility  Short term goal 2: Independent transfers  Short term goal 3: Independent ambulation with least restrictive device 150 ft  Short term goal 4: Pt to navigate 2 stairs with SBA  Short term goal 5: Pt to tolerate 30 minutes of activity to improve endurance. Patient Goals   Patient goals : Get stronger. Move without pain.     Plan    Plan  Times per week: 5-6x/week  Current Treatment Recommendations: Balance Training, Endurance Training, Safety Education & Training, Functional Mobility Training, Transfer Training, Gait Training, Stair training  Safety Devices  Type of devices: Left in chair, Call light within reach, Nurse notified, Gait belt, Patient at risk for falls, All fall risk precautions in place  Restraints  Initially in place: No     Therapy Time   Individual Concurrent Group Co-treatment   Time In 1355         Time Out 1420         Minutes 25         Timed Code Treatment Minutes: Ctrkathy Pollock 80, PTA

## 2020-09-30 NOTE — PLAN OF CARE
Problem: Pain:  Goal: Pain level will decrease  Description: Pain level will decrease  Outcome: Ongoing  Goal: Control of acute pain  Description: Control of acute pain  Outcome: Ongoing  Goal: Control of chronic pain  Description: Control of chronic pain  Outcome: Ongoing     Problem: Falls - Risk of:  Goal: Will remain free from falls  Description: Will remain free from falls  Outcome: Ongoing  Goal: Absence of physical injury  Description: Absence of physical injury  Outcome: Ongoing     Problem: Skin Integrity:  Goal: Will show no infection signs and symptoms  Description: Will show no infection signs and symptoms  Outcome: Ongoing  Goal: Absence of new skin breakdown  Description: Absence of new skin breakdown  Outcome: Ongoing  Goal: Risk for impaired skin integrity will decrease  Description: Risk for impaired skin integrity will decrease  Outcome: Ongoing     Problem: Discharge Planning:  Goal: Participates in care planning  Description: Participates in care planning  Outcome: Ongoing  Goal: Discharged to appropriate level of care  Description: Discharged to appropriate level of care  Outcome: Ongoing     Problem: Anxiety/Stress:  Goal: Level of anxiety will decrease  Description: Level of anxiety will decrease  Outcome: Ongoing     Problem: Aspiration:  Goal: Absence of aspiration  Description: Absence of aspiration  Outcome: Ongoing     Problem: Fluid Volume - Imbalance:  Goal: Absence of imbalanced fluid volume signs and symptoms  Description: Absence of imbalanced fluid volume signs and symptoms  Outcome: Ongoing     Problem: Sleep Pattern Disturbance:  Goal: Appears well-rested  Description: Appears well-rested  Outcome: Ongoing     Problem:  Bowel/Gastric:  Goal: Control of bowel function will improve  Description: Control of bowel function will improve  Outcome: Ongoing  Goal: Ability to achieve a regular elimination pattern will improve  Description: Ability to achieve a regular elimination pattern will improve  Outcome: Ongoing     Problem: Nutritional:  Goal: Ability to follow a diet with enough fiber (20 to 30 grams) for normal bowel function will improve  Description: Ability to follow a diet with enough fiber (20 to 30 grams) for normal bowel function will improve  Outcome: Ongoing     Problem: Nutrition  Goal: Optimal nutrition therapy  Outcome: Ongoing

## 2020-10-01 LAB
ANION GAP SERPL CALCULATED.3IONS-SCNC: 12 MMOL/L (ref 9–17)
BUN BLDV-MCNC: 9 MG/DL (ref 8–23)
BUN/CREAT BLD: ABNORMAL (ref 9–20)
CALCIUM SERPL-MCNC: 8.5 MG/DL (ref 8.6–10.4)
CHLORIDE BLD-SCNC: 105 MMOL/L (ref 98–107)
CO2: 22 MMOL/L (ref 20–31)
CREAT SERPL-MCNC: 1.11 MG/DL (ref 0.7–1.2)
GFR AFRICAN AMERICAN: >60 ML/MIN
GFR NON-AFRICAN AMERICAN: >60 ML/MIN
GFR SERPL CREATININE-BSD FRML MDRD: ABNORMAL ML/MIN/{1.73_M2}
GFR SERPL CREATININE-BSD FRML MDRD: ABNORMAL ML/MIN/{1.73_M2}
GLUCOSE BLD-MCNC: 114 MG/DL (ref 75–110)
GLUCOSE BLD-MCNC: 123 MG/DL (ref 75–110)
GLUCOSE BLD-MCNC: 124 MG/DL (ref 75–110)
GLUCOSE BLD-MCNC: 139 MG/DL (ref 70–99)
GLUCOSE BLD-MCNC: 146 MG/DL (ref 75–110)
HCT VFR BLD CALC: 30.9 % (ref 40.7–50.3)
HEMOGLOBIN: 9.4 G/DL (ref 13–17)
MCH RBC QN AUTO: 26.6 PG (ref 25.2–33.5)
MCHC RBC AUTO-ENTMCNC: 30.4 G/DL (ref 28.4–34.8)
MCV RBC AUTO: 87.3 FL (ref 82.6–102.9)
NRBC AUTOMATED: 0 PER 100 WBC
PARTIAL THROMBOPLASTIN TIME: 72.6 SEC (ref 20.5–30.5)
PDW BLD-RTO: 15.2 % (ref 11.8–14.4)
PLATELET # BLD: 312 K/UL (ref 138–453)
PMV BLD AUTO: 10.3 FL (ref 8.1–13.5)
POTASSIUM SERPL-SCNC: 3.4 MMOL/L (ref 3.7–5.3)
RBC # BLD: 3.54 M/UL (ref 4.21–5.77)
SODIUM BLD-SCNC: 139 MMOL/L (ref 135–144)
WBC # BLD: 8.8 K/UL (ref 3.5–11.3)

## 2020-10-01 PROCEDURE — 6370000000 HC RX 637 (ALT 250 FOR IP): Performed by: STUDENT IN AN ORGANIZED HEALTH CARE EDUCATION/TRAINING PROGRAM

## 2020-10-01 PROCEDURE — 36415 COLL VENOUS BLD VENIPUNCTURE: CPT

## 2020-10-01 PROCEDURE — 6360000002 HC RX W HCPCS: Performed by: INTERNAL MEDICINE

## 2020-10-01 PROCEDURE — 97535 SELF CARE MNGMENT TRAINING: CPT

## 2020-10-01 PROCEDURE — 6370000000 HC RX 637 (ALT 250 FOR IP): Performed by: NURSE PRACTITIONER

## 2020-10-01 PROCEDURE — 97116 GAIT TRAINING THERAPY: CPT

## 2020-10-01 PROCEDURE — 80048 BASIC METABOLIC PNL TOTAL CA: CPT

## 2020-10-01 PROCEDURE — 99212 OFFICE O/P EST SF 10 MIN: CPT

## 2020-10-01 PROCEDURE — 6370000000 HC RX 637 (ALT 250 FOR IP): Performed by: INTERNAL MEDICINE

## 2020-10-01 PROCEDURE — 85027 COMPLETE CBC AUTOMATED: CPT

## 2020-10-01 PROCEDURE — 82947 ASSAY GLUCOSE BLOOD QUANT: CPT

## 2020-10-01 PROCEDURE — 85730 THROMBOPLASTIN TIME PARTIAL: CPT

## 2020-10-01 PROCEDURE — 2580000003 HC RX 258: Performed by: STUDENT IN AN ORGANIZED HEALTH CARE EDUCATION/TRAINING PROGRAM

## 2020-10-01 PROCEDURE — 2060000000 HC ICU INTERMEDIATE R&B

## 2020-10-01 PROCEDURE — 6370000000 HC RX 637 (ALT 250 FOR IP): Performed by: SURGERY

## 2020-10-01 PROCEDURE — 97110 THERAPEUTIC EXERCISES: CPT

## 2020-10-01 PROCEDURE — 6360000002 HC RX W HCPCS: Performed by: STUDENT IN AN ORGANIZED HEALTH CARE EDUCATION/TRAINING PROGRAM

## 2020-10-01 RX ORDER — M-VIT,TX,IRON,MINS/CALC/FOLIC 27MG-0.4MG
1 TABLET ORAL DAILY
Status: DISCONTINUED | OUTPATIENT
Start: 2020-10-01 | End: 2020-10-02 | Stop reason: HOSPADM

## 2020-10-01 RX ADMIN — HYDROMORPHONE HYDROCHLORIDE 0.5 MG: 1 INJECTION, SOLUTION INTRAMUSCULAR; INTRAVENOUS; SUBCUTANEOUS at 11:30

## 2020-10-01 RX ADMIN — APIXABAN 5 MG: 5 TABLET, FILM COATED ORAL at 21:23

## 2020-10-01 RX ADMIN — METOPROLOL TARTRATE 100 MG: 50 TABLET, FILM COATED ORAL at 21:23

## 2020-10-01 RX ADMIN — METHOCARBAMOL TABLETS 750 MG: 750 TABLET, COATED ORAL at 14:52

## 2020-10-01 RX ADMIN — MULTIPLE VITAMINS W/ MINERALS TAB 1 TABLET: TAB at 08:14

## 2020-10-01 RX ADMIN — SODIUM CHLORIDE, PRESERVATIVE FREE 10 ML: 5 INJECTION INTRAVENOUS at 21:23

## 2020-10-01 RX ADMIN — HYDROMORPHONE HYDROCHLORIDE 0.5 MG: 1 INJECTION, SOLUTION INTRAMUSCULAR; INTRAVENOUS; SUBCUTANEOUS at 03:21

## 2020-10-01 RX ADMIN — DILTIAZEM HYDROCHLORIDE 60 MG: 60 TABLET, FILM COATED ORAL at 08:14

## 2020-10-01 RX ADMIN — ACETAMINOPHEN 1000 MG: 500 TABLET ORAL at 05:26

## 2020-10-01 RX ADMIN — HEPARIN SODIUM AND DEXTROSE 16.87 UNITS/KG/HR: 10000; 5 INJECTION INTRAVENOUS at 06:29

## 2020-10-01 RX ADMIN — PANTOPRAZOLE SODIUM 40 MG: 40 TABLET, DELAYED RELEASE ORAL at 05:26

## 2020-10-01 RX ADMIN — SODIUM CHLORIDE, PRESERVATIVE FREE 10 ML: 5 INJECTION INTRAVENOUS at 08:15

## 2020-10-01 RX ADMIN — DILTIAZEM HYDROCHLORIDE 60 MG: 60 TABLET, FILM COATED ORAL at 21:23

## 2020-10-01 RX ADMIN — INSULIN LISPRO 3 UNITS: 100 INJECTION, SOLUTION INTRAVENOUS; SUBCUTANEOUS at 03:22

## 2020-10-01 RX ADMIN — ACETAMINOPHEN 1000 MG: 500 TABLET ORAL at 14:51

## 2020-10-01 RX ADMIN — ACETAMINOPHEN 1000 MG: 500 TABLET ORAL at 21:22

## 2020-10-01 RX ADMIN — METHOCARBAMOL TABLETS 750 MG: 750 TABLET, COATED ORAL at 03:21

## 2020-10-01 RX ADMIN — METHOCARBAMOL TABLETS 750 MG: 750 TABLET, COATED ORAL at 21:23

## 2020-10-01 RX ADMIN — METHOCARBAMOL TABLETS 750 MG: 750 TABLET, COATED ORAL at 08:14

## 2020-10-01 RX ADMIN — OXYCODONE HYDROCHLORIDE 10 MG: 5 TABLET ORAL at 08:15

## 2020-10-01 RX ADMIN — OXYCODONE HYDROCHLORIDE 10 MG: 5 TABLET ORAL at 18:43

## 2020-10-01 RX ADMIN — APIXABAN 5 MG: 5 TABLET, FILM COATED ORAL at 08:14

## 2020-10-01 RX ADMIN — OXYCODONE HYDROCHLORIDE 10 MG: 5 TABLET ORAL at 14:53

## 2020-10-01 RX ADMIN — METOPROLOL TARTRATE 100 MG: 50 TABLET, FILM COATED ORAL at 08:14

## 2020-10-01 ASSESSMENT — PAIN DESCRIPTION - FREQUENCY
FREQUENCY: CONTINUOUS
FREQUENCY: INTERMITTENT
FREQUENCY: CONTINUOUS
FREQUENCY: CONTINUOUS

## 2020-10-01 ASSESSMENT — PAIN SCALES - GENERAL
PAINLEVEL_OUTOF10: 7
PAINLEVEL_OUTOF10: 8
PAINLEVEL_OUTOF10: 7
PAINLEVEL_OUTOF10: 4
PAINLEVEL_OUTOF10: 8
PAINLEVEL_OUTOF10: 7
PAINLEVEL_OUTOF10: 4
PAINLEVEL_OUTOF10: 4
PAINLEVEL_OUTOF10: 8
PAINLEVEL_OUTOF10: 6
PAINLEVEL_OUTOF10: 7

## 2020-10-01 ASSESSMENT — PAIN DESCRIPTION - PAIN TYPE
TYPE: ACUTE PAIN;SURGICAL PAIN
TYPE: ACUTE PAIN;SURGICAL PAIN
TYPE: SURGICAL PAIN
TYPE: ACUTE PAIN;SURGICAL PAIN
TYPE: SURGICAL PAIN

## 2020-10-01 ASSESSMENT — PAIN DESCRIPTION - ONSET
ONSET: ON-GOING
ONSET: ON-GOING

## 2020-10-01 ASSESSMENT — PAIN DESCRIPTION - ORIENTATION
ORIENTATION: MID
ORIENTATION: LOWER;MID
ORIENTATION: LOWER;MID

## 2020-10-01 ASSESSMENT — PAIN DESCRIPTION - PROGRESSION
CLINICAL_PROGRESSION: GRADUALLY IMPROVING
CLINICAL_PROGRESSION: NOT CHANGED
CLINICAL_PROGRESSION: GRADUALLY IMPROVING

## 2020-10-01 ASSESSMENT — PAIN DESCRIPTION - LOCATION
LOCATION: ABDOMEN

## 2020-10-01 ASSESSMENT — PAIN DESCRIPTION - DESCRIPTORS
DESCRIPTORS: SORE
DESCRIPTORS: ACHING

## 2020-10-01 NOTE — PROGRESS NOTES
session. Family / Caregiver Present: No  Subjective  Subjective: RN and pt agreeable to PT. Pt alert in bed upon arrival, very pleasant and cooperative. C/o of discomfort in stomach. General Comment  Comments: Pt left in recliner with call light within reach  Pain Screening  Patient Currently in Pain: Yes  Pain Assessment  Pain Assessment: 0-10  Pain Level: 7  Pain Type: Acute pain;Surgical pain  Pain Location: Abdomen  Pain Descriptors: Sore  Pain Frequency: Continuous  Pain Onset: On-going  Clinical Progression: Gradually improving  Functional Pain Assessment: Activities are not prevented  Non-Pharmaceutical Pain Intervention(s): Ambulation/Increased Activity;Repositioned  Vital Signs  Patient Currently in Pain: Yes       Orientation  Orientation  Overall Orientation Status: Within Normal Limits  Cognition      Objective   Bed mobility  Rolling to Left: Contact guard assistance  Rolling to Right: Contact guard assistance  Supine to Sit: Minimal assistance  Scooting: Contact guard assistance  Comment: increased time to complete, HOB elevated  Transfers  Sit to Stand: Stand by assistance  Stand to sit: Stand by assistance  Comment: Transfer with RW  Ambulation  Ambulation?: Yes  Ambulation 1  Surface: level tile  Device: Rolling Walker  Assistance: Contact guard assistance  Quality of Gait: flexed posture, grossly steady  Gait Deviations: Slow Susu;Decreased step height;Decreased step length  Distance: 125ft x2  Comments: 1 brief standing  rest break required  Stairs/Curb  Stairs?: No(pt declined)     Balance  Posture: Good  Sitting - Static: Good  Sitting - Dynamic: Good  Standing - Static: Good;-  Standing - Dynamic: Good;-  Comments: Standing balance with rolling walker  Exercises  Seated LE exercise program: Long Arc Quads, hip abduction/adduction, heel/toe raises, and marches.  Reps: 2-x   Comments: HEP issued for seated and standing B LE exs, all questions answered at this time     Goals  Short term

## 2020-10-01 NOTE — PROGRESS NOTES
Consulted for Loop Colostomy Care and Teaching              History: exploratory laparotomy, bowel resection, transverse loop colostomy, and LIOR drain placement on 9/23/2020.  Also, s/p exploratory laparotomy bowel resection of proximal jejunum and descending colon with mobilization of splenic flexure and primary anastomosis of small bowel and colon along with placement of gastrostomy tube on 9/2/2020.           10/01/20 1531   Colostomy RUQ Transverse   Placement Date/Time: 09/23/20 0413   Pre-existing: No  Timeout: Patient;Procedure;Site/Side;Appropriate Equipment; Consent Confirmed;Sterile Technique using full body drape;Site prepped with chlorhexidine;Correct Position  Inserted by: DR. Kosta Jones  Loca. .. Stomal Appliance 2 piece; Changed   Flange Size (inches) 2.25 Inches   Stoma  Assessment Moist;Red;Protrudes  (1 1/2\" x 1 3/4\" oval)   Mucocutaneous Junction Intact  (red at 9 o'clock. bridge sutures intact)   Peristomal Assessment Clean; Intact   Treatment Bag change;Site care   Stool Appearance Watery   Stool Color Brown       Patient demonstrated emptying, pouch removal, cutting to fit. He requires assistance with pouch measuring and application due to habitus and debility. Plans C after discharge. Wife present. Did not remain in room for care. Discussed [facility] with patient and wife who agree to enrollment. Sample supplies provided for home use. Contact information programmed into wife's cell phone for use after discharge prn.      Plan of care:   Reinforce steps of ostomy care. Encourage patient to empty own pouch.   Ostomy care:  Cut barrier to fit stoma with no more than 1/8\" of exposed skin. Currently 1 1/2\" x 1 3/4\".    Apply an [employer] Barrier Ring to the cut edge of the pouching system, prn  Empty the pouch when 1/3 to 1/2 full. Change the pouching system twice weekly and prn hygiene, leakage.   Our goal is to keep the skin around the stoma intact and to have a dependable pouching

## 2020-10-01 NOTE — PROGRESS NOTES
advancing oral diet)    Nutrition Interventions:   Food and/or Nutrient Delivery:  Continue Current Diet, Start Oral Nutrition Supplement  Nutrition Education/Counseling:  No recommendation at this time   Coordination of Nutrition Care:  Continued Inpatient Monitoring    Goals: Progressing  Meet % of estimated nutrition needs once nutrition initiated. Nutrition Monitoring and Evaluation:   Behavioral-Environmental Outcomes:  (N/A)   Food/Nutrient Intake Outcomes:  Food and Nutrient Intake, Supplement Intake  Physical Signs/Symptoms Outcomes:  Biochemical Data, GI Status, Nausea or Vomiting, Hemodynamic Status, Nutrition Focused Physical Findings, Skin, Weight     Discharge Planning:     Too soon to determine     Electronically signed by Taylor Ladd RD, LD on 10/1/20 at 2:55 PM EDT    Contact: [phone]

## 2020-10-01 NOTE — PROGRESS NOTES
General Surgery:  Daily Progress Note          POD # 8 s/p exploratory laparotomy transverse loop colostomy and GIUSEPPE drain placement           PATIENT NAME: Yael Ritter II     TODAY'S DATE: 10/1/2020, 5:09 AM  CC:  Abdominal pain     SUBJECTIVE:     Pt seen and examined at bedside. No acute events overnight. Hemodynamically stable and afebrile. He is currently tolerating his diet. 750 out of ostomy overnight. Minimal output of GIUSEPPE drains. Denies nausea, vomiting, fever or chills. Reports compliance with using IS. OBJECTIVE:   VITALS:  BP (!) 141/92   Pulse 81   Temp 98.8 °F (37.1 °C) (Oral)   Resp 12   Ht 5' 11\" (1.803 m)   Wt 254 lb 6.6 oz (115.4 kg)   SpO2 96%   BMI 35.48 kg/m²      INTAKE/OUTPUT:      Intake/Output Summary (Last 24 hours) at 10/1/2020 0509  Last data filed at 9/30/2020 1419  Gross per 24 hour   Intake 1892.78 ml   Output 1335 ml   Net 557.78 ml       PHYSICAL EXAM:  General Appearance: awake, alert, oriented, in no acute distress  HEENT:  Normocephalic, atraumatic, mucus membranes moist   Heart: regular rate   Lungs: no acute distress or accessory muscle use   Abdomen:sof, non-tender to palpation, mild distention, ostomy intact with good output. Right Giuseppe drain with minimal serous output in bulb, left giuseppe with minimal ss output. Gastrostomy tube intact. Incision:Midline incision intact with scant serous drainage noted on dressing during dressing change. Incision is approximated with staples intact. No active bleeding or signs of infection. Extremities: No cyanosis, pitting edema, rashes noted. Skin: Skin color, texture, turgor normal. No rashes or lesions.     IV Access:  PIV     Data:   AM labs pending     Radiology Review:  Ir Abscess Drainage Perc    Result Date: 9/29/2020  PROCEDURE: IR ABSCESS DRAIN PERC MODERATE CONSCIOUS SEDATION 9/29/2020 HISTORY: ORDERING SYSTEM PROVIDED HISTORY: left anterior abdominal wall fluid collection TECHNOLOGIST PROVIDED HISTORY: left anterior abdominal wall fluid collection CONTRAST: None SEDATION: 0versed and 50 end fentanyl were titrated intravenously for moderate sedation monitored under my direction. Total intraservice time of sedation was 15 minutes. The patient's vital signs were monitored throughout the procedure and recorded in the patient's medical record by the nurse. FLUOROSCOPY DOSE AND TYPE OR TIME AND EXPOSURES: None DESCRIPTION OF PROCEDURE: Informed consent was obtained after a detailed explanation of the procedure including risks, benefits, and alternatives. Universal protocol was observed. After informed consent patient is placed supine on the table. Left abdomen is prepped and draped sterile fashion. Ultrasound identified the subcutaneous fluid collection in the anterior abdomen. 1% lidocaine was infiltrated for local anesthesia. A small stab incision was made. Using direct trocar technique a 6 Jordanian pigtail drain was advanced into the fluid collection. Was secured using 2 0 Ethilon. Tea-colored fluid was aspirated and sent for culture and sensitivity. Drain was placed to LIOR bulb suction. Dressing was applied. Patient was returned to the floor in stable condition. Estimated blood loss: Minimal. FINDINGS: Subcutaneous fluid collection identified anterior abdominal wall. Successful placement 6 Jordanian drain in left anterior abdominal fluid collection. Sample sent for culture and sensitivity. ASSESSMENT:  There are no active hospital problems to display for this patient. 56 y.o. male with POD 8 s/p exploratory laparotomy, transverse loop colostomy and LIOR drain placement     Plan:  1. Monitor LIOR drain output, minimal overnight  2. Continue carb control diet   3. GI - rec to cont gastrostomy tube to gravity and monitor ostomy output   4. Monitor I+Os   5. Encourage ambulation with PT/OT  6. Encourage IS use. Pulling [phone] today  7.  Daily dressing changes to midline incision with fluffs and abd pads 8. Medical management per primary     Electronically signed by Bg Molina DO  on 10/1/2020 at 5:09 AM

## 2020-10-01 NOTE — CARE COORDINATION
Transitional Planning  Patient on carb control diet, still with LIOR x2. Worked with PT this morning - ambulated 125 ft x2 with RW. Plan for home with spouse and [facility].

## 2020-10-01 NOTE — PROGRESS NOTES
Physician Progress Note      PATIENT:               Michele Gonzalez  CSN #:                  [id number]  :                       1960  ADMIT DATE:       2020 4:21 PM  [address] [place] DATE:  RESPONDING  PROVIDER #:        Pierce COELHO DO          QUERY TEXT:    Patient admitted with pneumoperitoneum. If possible, please document in   progress notes and discharge summary if you are evaluating and /or treating   any of the following: The medical record reflects the following:  Risk Factors: pneumoperitoneum s/p exp lap and colostomy, other recent   surgeries  Clinical Indicators: per dietician eval \"meets criteria for moderate   malnutrition\", has been NPO without nutrition d/t abd surgeries, abd pain with   N/V, decreased appetitie, 50% or less of estimated energy requirements for 5   or more days, weight loss greater than 5% over 1 month  Treatment: advance diet as allowed and add ONS when able, monitor weight, I/O,   dietician consult    Call if any questions. Thank you, Winsome Green, [facility] [phone]  Options provided:  -- Protein calorie malnutrition moderate  -- Other - I will add my own diagnosis  -- Disagree - Not applicable / Not valid  -- Disagree - Clinically unable to determine / Unknown  -- Refer to Clinical Documentation Reviewer    PROVIDER RESPONSE TEXT:    This patient has moderate protein calorie malnutrition.     Query created by: Claudette Ellsworth on 2020 11:42 AM      Electronically signed by:  Cesar De Paz DO 10/1/2020 7:52 AM

## 2020-10-01 NOTE — PROGRESS NOTES
Occupational Therapy  Facility/Department: [address] STEPDOWN  Daily Treatment Note  NAME: Isaura Crespo II  : 1960  MRN: [id number]    Date of Service: 10/1/2020    Discharge Recommendations:  Patient would benefit from continued therapy after discharge       Assessment   Performance deficits / Impairments: Decreased functional mobility ; Decreased safe awareness;Decreased balance;Decreased ADL status; Decreased posture;Decreased endurance;Decreased high-level IADLs;Decreased strength  Treatment Diagnosis: Intra Abdominal Free Air  Prognosis: Good  REQUIRES OT FOLLOW UP: Yes  Activity Tolerance  Activity Tolerance: Patient limited by pain; Patient limited by fatigue  Safety Devices  Safety Devices in place: Yes  Type of devices: All fall risk precautions in place;Call light within reach; Left in chair;Nurse notified         Patient Diagnosis(es): The encounter diagnosis was Free intraperitoneal air. has a past medical history of Back pain, Cellulitis, Colostomy RUQ, Gout, H/O left nephrectomy, HTN, and T2DM. has a past surgical history that includes Ankle surgery; knee surgery (Bilateral); Carpal tunnel release (Bilateral); Cystoscopy (Right, 2020); total nephrectomy (Left, 2020); Abdominal exploration surgery (2020); hc cath power picc triple (9/3/2020); Kidney surgery (Left, 2020); Small intestine surgery (N/A, 2020); Abdomen surgery (2020); and laparotomy (N/A, 2020).     Restrictions  Restrictions/Precautions  Restrictions/Precautions: Surgical Protocols, Fall Risk, Up as Tolerated  Required Braces or Orthoses?: Yes(abdominal binder-not on upon arrival)  Required Braces or Orthoses  Other: Abdominal Binder  Position Activity Restriction  Other position/activity restrictions: up with assistance  Subjective   General  Patient assessed for rehabilitation services?: Yes  Pain Assessment  Pain Assessment: 0-10  Pain Level: 8  Patient's Stated Pain Goal: No pain  Pain Type: Surgical pain  Pain Location: Abdomen  Pain Orientation: Mid  Pain Frequency: Continuous  Clinical Progression: Gradually improving  Response to Pain Intervention: Patient Satisfied  Vital Signs  Patient Currently in Pain: Yes      Objective    ADL  Feeding: Independent;Setup  Grooming: Stand by assistance;Setup; Increased time to complete(SBA-wash face, brush teeth/hair, shaved, Mod-wash/dry hair)  UE Bathing: Minimal assistance;Setup; Increased time to complete(w/back)  LE Bathing: Moderate assistance;Setup; Increased time to complete(w/lower legs and feet)  UE Dressing: Minimal assistance;Setup; Increased time to complete(w/gown)  LE Dressing: Maximum assistance;Setup; Increased time to complete(w/footies)  Toileting: Stand by assistance(using urinal, has colostomy)      Pt up in chair upon arrival. Setup at tray table for self care (see above for LOF). Pt on RA throughout tx. No SOB with increased activity. Pt remained in recliner, BLE elevated, call light and phone in reach. RN notified.        Balance  Sitting Balance: Stand by assistance(seated in recliner)  Standing Balance: Contact guard assistance  Standing Balance  Time: Pt stood for approx 2-3 min for james care  Comment: No LOB     Transfers  Sit to stand: Minimal assistance  Stand to sit: Minimal assistance  Transfer Comments: No LOB    Plan   Plan  Times per week: 3-4 x week    Goals  Short term goals  Time Frame for Short term goals: Pt will, by discharge:  Short term goal 1: Dem I with bed mobility  Short term goal 2: Dem cga for functional transfers  Short term goal 3: Dem cga for dynamic mobility for adl completion  Short term goal 4: dem good safety awareness tech for all transfers/mobility  Short term goal 5: Dem min a for adl performance  Short term goal 6: Dem a 10 min dynamic task with cga to increase activity tolerance     Therapy Time   Individual Concurrent Group Co-treatment   Time In  1530         Time Out  1625         Minutes  55 total tx time                 [address]

## 2020-10-01 NOTE — PROGRESS NOTES
Progress Note    10/1/2020   12:04 PM    Name:  Joelle Pham  MRN:    [id number]     Acct:     [de-identified]   Room:  [address] [place] [address]  IP Day:   Progress Note    9     Admit Date: 9/22/2020  4:21 PM  PCP: Rome Daley MD    Subjective:     C/C:   Chief Complaint   Patient presents with    Abdominal Pain    Nausea       Interval History: Status: improved. Pt examined chart reviewed as above   Admitted with pneumoperitoneum post nepherctomy and bowel obstruction   he has had colostomy this admission and is slowly reuperating  Agree with consultants  Ros  General no weight loss or fever  ent no trouble hearing tasting talking or swallowing  Cardiac no chest pain or dyspnea  Respiratory no cough or dyspnea  Gi [place]  gu [place]  Ortho no complaints of synovitis or decreased range of motion  Neuro no complaints of gait station or speech  Psych no complaints or nerves or memory  Vascular no claudication or stroke  Endo dm2  Heme no complaints of anemia or blood dyscrasias      Medications: Allergies: Allergies   Allergen Reactions    Ampicillin Swelling     Swelling of throat.  Pcn [Penicillins] Swelling     Throat swelling. Tolerated cefepime during 8/31/20 admission.     Tape Loyce Concepcion Tape]        Current Meds: therapeutic multivitamin-minerals 1 tablet, Daily  apixaban (ELIQUIS) tablet 5 mg, BID  HYDROmorphone (DILAUDID) injection 0.5 mg, Q6H PRN  bisacodyl (DULCOLAX) suppository 10 mg, Daily  dilTIAZem (CARDIZEM) tablet 60 mg, 2 times per day  metoprolol tartrate (LOPRESSOR) tablet 100 mg, BID  ondansetron (ZOFRAN) injection 4 mg, Q6H PRN  metoprolol (LOPRESSOR) injection 5 mg, Q6H PRN  methocarbamol (ROBAXIN) tablet 750 mg, Q6H  insulin lispro (HUMALOG) injection vial 0-18 Units, Q6H  oxyCODONE (ROXICODONE) immediate release tablet 10 mg, Q4H PRN  pantoprazole (PROTONIX) tablet 40 mg, QAM AC  sodium chloride flush 0.9 % injection 10 mL, 2 times per day  sodium chloride flush 0.9 % injection 10 mL, PRN  acetaminophen (TYLENOL) tablet 1,000 mg, 3 times per day  lidocaine 4 % external patch 2 patch, Daily  glucose (GLUTOSE) 40 % oral gel 15 g, PRN  dextrose 50 % IV solution, PRN  glucagon (rDNA) injection 1 mg, PRN  dextrose 5 % solution, PRN        Data:     Code Status:  Full Code    Family History   Problem Relation Age of Onset    Stroke Maternal Grandmother     Stroke Maternal Grandfather        Social History     Socioeconomic History    Marital status:      Spouse name: Not on file    Number of children: Not on file    Years of education: Not on file    Highest education level: Not on file   Occupational History    Not on file   Social Needs    Financial resource strain: Not on file    Food insecurity     Worry: Not on file     Inability: Not on file    Transportation needs     Medical: Not on file     Non-medical: Not on file   Tobacco Use    Smoking status: Never Smoker    Smokeless tobacco: Never Used   Substance and Sexual Activity    Alcohol use:  Yes     Alcohol/week: 0.0 standard drinks     Comment: once a week    Drug use: No    Sexual activity: Not on file   Lifestyle    Physical activity     Days per week: Not on file     Minutes per session: Not on file    Stress: Not on file   Relationships    Social connections     Talks on phone: Not on file     Gets together: Not on file     Attends Sikhism service: Not on file     Active member of club or organization: Not on file     Attends meetings of clubs or organizations: Not on file     Relationship status: Not on file    Intimate partner violence     Fear of current or ex partner: Not on file     Emotionally abused: Not on file     Physically abused: Not on file     Forced sexual activity: Not on file   Other Topics Concern    Not on file   Social History Narrative    Not on file       Vitals:  /89   Pulse 76   Temp 98.4 °F (36.9 °C) (Oral)   Resp (!) 6   Ht 5' 11\" (1.803 m)   Wt 254 lb 6.6 oz (115.4 kg) SpO2 97%   BMI 35.48 kg/m²   Temp (24hrs), Av.5 °F (36.9 °C), Min:98 °F (36.7 °C), Max:99 °F (37.2 °C)    Recent Labs     20  1543 20  1945 10/01/20  0321 10/01/20  0948   POCGLU 145* 156* 146* 124*       I/O (24Hr): Intake/Output Summary (Last 24 hours) at 10/1/2020 1204  Last data filed at 10/1/2020 0528  Gross per 24 hour   Intake 3237.78 ml   Output  ml   Net 1227.78 ml       Labs:  Daily Labs for 3 Days:    Hematology:  Recent Labs     20  0542 20  0548 10/01/20  0558   WBC 8.7 7.9 8.8   HGB 9.1* 9.0* 9.4*   HCT 30.7* 29.7* 30.9*    290 312   INR 1.2  --   --      Chemistry:  Recent Labs     2042 2048 10/01/20  0558    138 139   K 3.7 3.4* 3.4*    106 105   CO2 20 22 22   GLUCOSE 164* 140* 139*   BUN 10 9 9   CREATININE 1.09 1.13 1.11   CALCIUM 8.4* 8.3* 8.5*     No results for input(s): PROT, LABALBU, LABA1C, G4FXYQB, H0SMWYM, FT4, TSH, AST, ALT, LDH, GGT, ALKPHOS, BILITOT, BILIDIR, AMMONIA, AMYLASE, LIPASE, LACTATE, CHOL, HDL, LDLCHOLESTEROL, CHOLHDLRATIO, TRIG, VLDL, BNP, TROPONINI, CKTOTAL, CKMB, CKMBINDEX in the last 72 hours.     paraclinics reviewed as posted  Physical Examination:      General appearance: alert, cooperative and no distress  Mental Status: oriented to person, place and time and normal affect  Lungs: clear to auscultation bilaterally, normal effort  Heart: regular rate and rhythm, no murmur,  Abdomen: soft,  Minimally tender with no rebound guarding or rigidity, nondistended, bowel sounds present all four quadrants, no masses, hepatomegaly or splenomegaly  Extremities: no edema, redness or tenderness in the calves  Skin: no gross lesions, rashes, or induration  ent perrla with eomi conjunctivae pink sclerae clear  Neuro-oriented and alert cn2-12 intact cerebral and cerebellar intact reflexes +2/4 bilaterally  Vascular  Good dp tp carotids  Ortho- no masses or synovitis  Good rom  Lymphatics none palpated in cervical supraclavicular axillary  Or inguinal area    Assessment:        Primary Problem  <principal problem not specified>   pneuopritoneum   colostomy  moderate protein calorie malnutrition  There are no active hospital problems to display for this patient. Past Medical History:   Diagnosis Date    Back pain     Cellulitis     Colostomy RUQ 09/23/2020    Gout     H/O left nephrectomy 09/02/2020    HTN     T2DM         Plan:        1. Cont same plan of care  2.  Bmp in am      Electronically signed by Ermalene Boeck, MD on 10/1/2020 at 12:04 PM

## 2020-10-02 VITALS
BODY MASS INDEX: 33.06 KG/M2 | TEMPERATURE: 98.6 F | HEIGHT: 71 IN | WEIGHT: 236.11 LBS | RESPIRATION RATE: 14 BRPM | SYSTOLIC BLOOD PRESSURE: 122 MMHG | DIASTOLIC BLOOD PRESSURE: 89 MMHG | OXYGEN SATURATION: 97 % | HEART RATE: 103 BPM

## 2020-10-02 LAB
GLUCOSE BLD-MCNC: 113 MG/DL (ref 75–110)
GLUCOSE BLD-MCNC: 136 MG/DL (ref 75–110)

## 2020-10-02 PROCEDURE — 6370000000 HC RX 637 (ALT 250 FOR IP): Performed by: SURGERY

## 2020-10-02 PROCEDURE — 94761 N-INVAS EAR/PLS OXIMETRY MLT: CPT

## 2020-10-02 PROCEDURE — 6370000000 HC RX 637 (ALT 250 FOR IP): Performed by: STUDENT IN AN ORGANIZED HEALTH CARE EDUCATION/TRAINING PROGRAM

## 2020-10-02 PROCEDURE — 2580000003 HC RX 258: Performed by: STUDENT IN AN ORGANIZED HEALTH CARE EDUCATION/TRAINING PROGRAM

## 2020-10-02 PROCEDURE — 6360000002 HC RX W HCPCS

## 2020-10-02 PROCEDURE — 6370000000 HC RX 637 (ALT 250 FOR IP): Performed by: INTERNAL MEDICINE

## 2020-10-02 PROCEDURE — 6370000000 HC RX 637 (ALT 250 FOR IP): Performed by: NURSE PRACTITIONER

## 2020-10-02 PROCEDURE — 6360000002 HC RX W HCPCS: Performed by: STUDENT IN AN ORGANIZED HEALTH CARE EDUCATION/TRAINING PROGRAM

## 2020-10-02 PROCEDURE — 82947 ASSAY GLUCOSE BLOOD QUANT: CPT

## 2020-10-02 PROCEDURE — 99211 OFF/OP EST MAY X REQ PHY/QHP: CPT

## 2020-10-02 RX ORDER — M-VIT,TX,IRON,MINS/CALC/FOLIC 27MG-0.4MG
1 TABLET ORAL DAILY
Qty: 90 TABLET | Refills: 1 | Status: SHIPPED | OUTPATIENT
Start: 2020-10-03

## 2020-10-02 RX ORDER — FENTANYL CITRATE 50 UG/ML
100 INJECTION, SOLUTION INTRAMUSCULAR; INTRAVENOUS ONCE
Status: COMPLETED | OUTPATIENT
Start: 2020-10-02 | End: 2020-10-02

## 2020-10-02 RX ORDER — METHOCARBAMOL 750 MG/1
750 TABLET, FILM COATED ORAL EVERY 6 HOURS
Qty: 28 TABLET | Refills: 0 | Status: SHIPPED | OUTPATIENT
Start: 2020-10-02 | End: 2020-10-09

## 2020-10-02 RX ORDER — OXYCODONE HYDROCHLORIDE 10 MG/1
10 TABLET ORAL EVERY 6 HOURS PRN
Qty: 12 TABLET | Refills: 0 | Status: CANCELLED | OUTPATIENT
Start: 2020-10-02 | End: 2020-10-05

## 2020-10-02 RX ORDER — DILTIAZEM HYDROCHLORIDE 60 MG/1
60 TABLET, FILM COATED ORAL EVERY 12 HOURS SCHEDULED
Qty: 120 TABLET | Refills: 3 | Status: SHIPPED | OUTPATIENT
Start: 2020-10-02 | End: 2020-12-16

## 2020-10-02 RX ORDER — OXYCODONE HYDROCHLORIDE 5 MG/1
5 TABLET ORAL EVERY 6 HOURS PRN
Qty: 12 TABLET | Refills: 0 | Status: SHIPPED | OUTPATIENT
Start: 2020-10-02 | End: 2020-10-05

## 2020-10-02 RX ORDER — BISACODYL 10 MG
10 SUPPOSITORY, RECTAL RECTAL DAILY
Qty: 30 SUPPOSITORY | Refills: 0 | Status: SHIPPED | OUTPATIENT
Start: 2020-10-03 | End: 2020-10-28

## 2020-10-02 RX ORDER — LIDOCAINE 4 G/G
2 PATCH TOPICAL DAILY
Qty: 60 PATCH | Refills: 1 | Status: SHIPPED | OUTPATIENT
Start: 2020-10-03 | End: 2020-11-02

## 2020-10-02 RX ORDER — FENTANYL CITRATE 50 UG/ML
INJECTION, SOLUTION INTRAMUSCULAR; INTRAVENOUS
Status: COMPLETED
Start: 2020-10-02 | End: 2020-10-02

## 2020-10-02 RX ADMIN — HYDROMORPHONE HYDROCHLORIDE 0.5 MG: 1 INJECTION, SOLUTION INTRAMUSCULAR; INTRAVENOUS; SUBCUTANEOUS at 04:59

## 2020-10-02 RX ADMIN — OXYCODONE HYDROCHLORIDE 10 MG: 5 TABLET ORAL at 06:18

## 2020-10-02 RX ADMIN — OXYCODONE HYDROCHLORIDE 10 MG: 5 TABLET ORAL at 16:25

## 2020-10-02 RX ADMIN — METOPROLOL TARTRATE 100 MG: 50 TABLET, FILM COATED ORAL at 07:59

## 2020-10-02 RX ADMIN — FENTANYL CITRATE 100 MCG: 50 INJECTION, SOLUTION INTRAMUSCULAR; INTRAVENOUS at 11:56

## 2020-10-02 RX ADMIN — APIXABAN 5 MG: 5 TABLET, FILM COATED ORAL at 07:59

## 2020-10-02 RX ADMIN — OXYCODONE HYDROCHLORIDE 10 MG: 5 TABLET ORAL at 09:52

## 2020-10-02 RX ADMIN — PANTOPRAZOLE SODIUM 40 MG: 40 TABLET, DELAYED RELEASE ORAL at 06:16

## 2020-10-02 RX ADMIN — ACETAMINOPHEN 1000 MG: 500 TABLET ORAL at 06:16

## 2020-10-02 RX ADMIN — METHOCARBAMOL TABLETS 750 MG: 750 TABLET, COATED ORAL at 16:25

## 2020-10-02 RX ADMIN — DILTIAZEM HYDROCHLORIDE 60 MG: 60 TABLET, FILM COATED ORAL at 07:59

## 2020-10-02 RX ADMIN — MULTIPLE VITAMINS W/ MINERALS TAB 1 TABLET: TAB at 07:59

## 2020-10-02 RX ADMIN — Medication 10 ML: at 05:00

## 2020-10-02 RX ADMIN — METHOCARBAMOL TABLETS 750 MG: 750 TABLET, COATED ORAL at 07:59

## 2020-10-02 RX ADMIN — ACETAMINOPHEN 1000 MG: 500 TABLET ORAL at 13:36

## 2020-10-02 RX ADMIN — BISACODYL 10 MG: 10 SUPPOSITORY RECTAL at 07:59

## 2020-10-02 RX ADMIN — METHOCARBAMOL TABLETS 750 MG: 750 TABLET, COATED ORAL at 03:34

## 2020-10-02 ASSESSMENT — PAIN SCALES - GENERAL
PAINLEVEL_OUTOF10: 8
PAINLEVEL_OUTOF10: 8
PAINLEVEL_OUTOF10: 4
PAINLEVEL_OUTOF10: 10
PAINLEVEL_OUTOF10: 10
PAINLEVEL_OUTOF10: 7
PAINLEVEL_OUTOF10: 5
PAINLEVEL_OUTOF10: 8

## 2020-10-02 ASSESSMENT — PAIN DESCRIPTION - FREQUENCY: FREQUENCY: INTERMITTENT

## 2020-10-02 ASSESSMENT — PAIN DESCRIPTION - LOCATION: LOCATION: ABDOMEN

## 2020-10-02 ASSESSMENT — PAIN DESCRIPTION - PAIN TYPE: TYPE: SURGICAL PAIN

## 2020-10-02 NOTE — PROGRESS NOTES
need for ONS)    Nutrition Interventions:   Food and/or Nutrient Delivery:  Continue Current Diet, Continue Oral Nutrition Supplement  Nutrition Education/Counseling:  No recommendation at this time   Coordination of Nutrition Care:  Continued Inpatient Monitoring    Goals: Progressing  Meet % of estimated nutrition needs once nutrition initiated. Nutrition Monitoring and Evaluation:   Behavioral-Environmental Outcomes:  (N/A)   Food/Nutrient Intake Outcomes:  Food and Nutrient Intake, Supplement Intake  Physical Signs/Symptoms Outcomes:  Biochemical Data, GI Status, Hemodynamic Status, Skin, Weight, Nutrition Focused Physical Findings     Discharge Planning:     Too soon to determine     Electronically signed by Danay Ferreira RD, LD on 10/2/20 at 1:43 PM EDT    Contact: [phone]

## 2020-10-02 NOTE — PLAN OF CARE
Problem: Pain:  Goal: Pain level will decrease  Description: Pain level will decrease  Outcome: Ongoing  Note: Pain level assessment complete. Pt rated abdominal pain on 0-10 scale. . Pt educated on pain scale and control interventions. PRN pain medication given per pt request. Pt verbalizes understanding to call out with new onset of pain or unrelieved pain. Will continue to monitor. Problem: Falls - Risk of:  Goal: Will remain free from falls  Description: Will remain free from falls  Outcome: Ongoing  Note: No falls to date. Bed in lowest position with call light within reach. Floor free from obstacles and pt verbalizes understanding to call out with needs or assistance with ambulation. Falls risk score evaluated- medium risk. Bed alarm activated and falling star posted. Will continue to monitor additional needs.             Goal: Absence of physical injury  Description: Absence of physical injury  Outcome: Ongoing     Problem: Skin Integrity:  Goal: Will show no infection signs and symptoms  Description: Will show no infection signs and symptoms  Outcome: Ongoing  Goal: Absence of new skin breakdown  Description: Absence of new skin breakdown  Outcome: Ongoing     Problem: Anxiety/Stress:  Goal: Level of anxiety will decrease  Description: Level of anxiety will decrease  Outcome: Ongoing

## 2020-10-02 NOTE — PROGRESS NOTES
Discharge teaching completed. Copy of AVS reviewed with and given to patient. All questions and concerns answered to patient's satisfaction. List of PCP providers provided to patient, seeing as Dr. Shirley Leo is imminently retiring. Dressing supplies provided as well. VS stable. IV access discontinued. Patient transported downstairs to the main lobby by house staff via wheelchair.

## 2020-10-02 NOTE — PROGRESS NOTES
CLINICAL PHARMACY NOTE: MEDS TO [address] Select Patient?: No  Total # of Prescriptions Filled: 2   The following medications were delivered to the patient:  · Oxycodone  · Methocarbamol   Total # of Interventions Completed: 0  Time Spent (min): 5    Additional Documentation: meds delivered to patient 10/2 at 4:15pm. Patient in room 419. Paid with cash.

## 2020-10-02 NOTE — PROGRESS NOTES
perfectserved gen surge resident about patients pain after getting LIOR puller he said he would come evaluate.     Annika Bucio, RN    Doctor at bedside to evaluate patient no new orders

## 2020-10-02 NOTE — PROGRESS NOTES
CLINICAL PHARMACY NOTE: MEDS TO [address] Select Patient?: No  Total # of Prescriptions Filled: 2   The following medications were delivered to the patient:  · Diltiazem  · Eliquis  Total # of Interventions Completed: 0  Time Spent (min): 5    Additional Documentation: meds delivered to patient 10/2 at 3:45pm. Patient in room 419. Paid with cash.

## 2020-10-02 NOTE — PROGRESS NOTES
General Surgery:  Daily Progress Note          POD # 9 s/p ex-lap transverse loop colostomy with LIOR drain placement           PATIENT NAME: Eva Reyes II     TODAY'S DATE: 10/2/2020, 5:05 AM  CC:  Abdominal pain     SUBJECTIVE:     Pt seen and examined at bedside. No acute events overnight. Hemodynamically stable and afebrile. Tolerating carb control diet without any nausea or vomiting. Ostomy functioning well. Midline incision dressing clean, dry and intact. Denies any abdominal pain today. Tmax 98.5 (36.9). OBJECTIVE:   VITALS:  BP (!) 134/96   Pulse 88   Temp 98.5 °F (36.9 °C) (Oral)   Resp 14   Ht 5' 11\" (1.803 m)   Wt 254 lb 6.6 oz (115.4 kg)   SpO2 97%   BMI 35.48 kg/m²      INTAKE/OUTPUT:      Intake/Output Summary (Last 24 hours) at 10/2/2020 0505  Last data filed at 10/1/2020 2350  Gross per 24 hour   Intake 2963.68 ml   Output 3540 ml   Net -576.32 ml       PHYSICAL EXAM:  General Appearance: awake, alert, oriented, in no acute distress  HEENT:  Normocephalic, atraumatic, mucus membranes moist   Heart: regular rate   Lungs: no acute respiratory distress or accessory muscle use   Abdomen: soft, nttp, LIOR drains right: minimal serous output, left: minimal serosanguineous output, gastrostomy tube intact   Incision: midline incision clean, no active bleeding or drainage noted   Extremities: No cyanosis, pitting edema, rashes noted. Skin: Skin color, texture, turgor normal. No rashes or lesions. Data:  POC Glucose Fingerstick: 113        ASSESSMENT:  There are no active hospital problems to display for this patient. 56 y.o. male POD# 9  S/p exploratory laparotomy, transverse loop colostomy and LIOR drain placement       Plan:  1. Medical management per primary   2. Continue carb control diet, patient tolerating well   3. GI - rec to cont gastrostomy tube to gravity and monitor ostomy output  4.  Continue to monitor LIOR output, may consider removal when no output 5. I+O's  6. Encourage ambulation with PT/OT and IS use   7.  Dressing changes daily to midline incision with fluffs, abd pads, and paper tape     Electronically signed by Gloria Bosworth, DO  on 10/2/2020 at 5:05 AM

## 2020-10-02 NOTE — PROGRESS NOTES
Progress Note    10/2/2020   11:06 AM    Name:  Preethi Jackson  MRN:    [id number]     Acct:     [de-identified]   Room:  [address] [place] [address] Day:   Progress Note    10     Admit Date: 9/22/2020  4:21 PM  PCP: Sadie Holland MD    Subjective:     C/C:   Chief Complaint   Patient presents with    Abdominal Pain    Nausea       Interval History: Status: improved  . Pt examined chart reviewed as above    doing better tolerating orals and much less pain   agree with consultants   admitted with pneumoperitoneum and has had colostomy this hospitalization  recuperating nicely at this point    Ros  General no weight loss or fever  ent no trouble hearing tasting talking or swallowing  Cardiac no chest pain or dyspnea  Respiratory no cough or dyspnea  Gi hop   gu [place]  Ortho no complaints of synovitis or decreased range of motion  Neuro no complaints of gait station or speech  Psych no complaints or nerves or memory  Vascular no claudication or stroke  Endo dm2  Heme no complaints of anemia or blood dyscrasias      Medications: Allergies: Allergies   Allergen Reactions    Ampicillin Swelling     Swelling of throat.  Pcn [Penicillins] Swelling     Throat swelling. Tolerated cefepime during 8/31/20 admission.     Tape Jimbo Fried Tape]        Current Meds: therapeutic multivitamin-minerals 1 tablet, Daily  apixaban (ELIQUIS) tablet 5 mg, BID  HYDROmorphone (DILAUDID) injection 0.5 mg, Q6H PRN  bisacodyl (DULCOLAX) suppository 10 mg, Daily  dilTIAZem (CARDIZEM) tablet 60 mg, 2 times per day  metoprolol tartrate (LOPRESSOR) tablet 100 mg, BID  ondansetron (ZOFRAN) injection 4 mg, Q6H PRN  metoprolol (LOPRESSOR) injection 5 mg, Q6H PRN  methocarbamol (ROBAXIN) tablet 750 mg, Q6H  insulin lispro (HUMALOG) injection vial 0-18 Units, Q6H  oxyCODONE (ROXICODONE) immediate release tablet 10 mg, Q4H PRN  pantoprazole (PROTONIX) tablet 40 mg, QAM AC  sodium chloride flush 0.9 % injection 10 mL, 2 times per day  sodium chloride Wt 236 lb 1.8 oz (107.1 kg)   SpO2 96%   BMI 32.93 kg/m²   Temp (24hrs), Av.4 °F (36.9 °C), Min:97.4 °F (36.3 °C), Max:99.7 °F (37.6 °C)    Recent Labs     10/01/20  0948 10/01/20  1518 10/01/20  2104 10/02/20  0334   POCGLU 124* 123* 114* 113*       I/O (24Hr): Intake/Output Summary (Last 24 hours) at 10/2/2020 1106  Last data filed at 10/2/2020 0630  Gross per 24 hour   Intake 738.68 ml   Output 2380 ml   Net -1641.32 ml       Labs:  Daily Labs for 3 Days:    Hematology:  Recent Labs     20  0548 10/01/20  0558   WBC 7.9 8.8   HGB 9.0* 9.4*   HCT 29.7* 30.9*    312     Chemistry:  Recent Labs     20  0548 10/01/20  0558    139   K 3.4* 3.4*    105   CO2 22 22   GLUCOSE 140* 139*   BUN 9 9   CREATININE 1.13 1.11   CALCIUM 8.3* 8.5*     No results for input(s): PROT, LABALBU, LABA1C, K8WHUPP, P4PDZOF, FT4, TSH, AST, ALT, LDH, GGT, ALKPHOS, BILITOT, BILIDIR, AMMONIA, AMYLASE, LIPASE, LACTATE, CHOL, HDL, LDLCHOLESTEROL, CHOLHDLRATIO, TRIG, VLDL, BNP, TROPONINI, CKTOTAL, CKMB, CKMBINDEX in the last 72 hours.     paraclinics reviewed as posted  Physical Examination:      General appearance: alert, cooperative and no distress  Mental Status: oriented to person, place and time and normal affect  Lungs: clear to auscultation bilaterally, normal effort  Heart: regular rate and rhythm, no murmur,  Abdomen: soft, nontender, nondistended, bowel sounds present all four quadrants, no masses, hepatomegaly or splenomegaly  Still with drain in left lower quadrant  Extremities: no edema, redness or tenderness in the calves  Skin: no gross lesions, rashes, or induration  ent perrla with eomi conjunctivae pink sclerae clear  Neuro-oriented and alert cn2-12 intact cerebral and cerebellar intact reflexes +2/4 bilaterally  Vascular  Good dp tp carotids  Ortho- no masses or synovitis  Good rom  Lymphatics none palpated in cervical supraclavicular axillary  Or inguinal area    Assessment:

## 2020-10-10 NOTE — DISCHARGE SUMMARY
Physician Discharge Summary    Patient ID:  Angela Barrera  [id number]  56 y.o.  1960    Admit date: 9/22/2020    Discharge date and time: 10/2/2020  6:46 PM     Admitting Physician: Hugo Malik MD     Discharge Diagnoses:   Patient Active Problem List   Diagnosis    Cellulitis    Type 2 diabetes mellitus without complication ([facility]    Essential hypertension    Morbid obesity due to excess calories ([address]    Chronic kidney disease (CKD)    DEBBI (acute kidney injury) ([address]    Bowel obstruction ([address]    Small bowel obstruction ([address]    Moderate malnutrition ([address]    Hyperglycemia    Renal cell cancer ([facility]       Discharged Condition: good    Hospital Course: admitted with pneumoperitoneum  Sent to surgery for exploratory laparotomy and diverting colostomy  He did well and was eventually transferred to his home [facility] follow up with pcp and consultants  Nephrology involved with debbi on cki that essentially resolved with fluid treatment  See chart for details     Consults: IP CONSULT TO GENERAL SURGERY  IP CONSULT TO PRIMARY CARE PROVIDER  IP CONSULT TO SURGICAL CRITICAL CARE TEAM  IP CONSULT TO CARDIOLOGY  IP CONSULT TO NEPHROLOGY    Significant Diagnostic Studies: Daily Labs for 3 Days:    Hematology:No results for input(s): WBC, HGB, HCT, PLT, SEDRATE, INR in the last 72 hours. Invalid input(s): PT  Chemistry:No results for input(s): NA, K, CL, CO2, GLUCOSE, BUN, CREATININE, MG, CALCIUM, CAION, PHOS in the last 72 hours. No results for input(s): PROT, LABALBU, LABA1C, S3ARDAH, U8EOFHQ, FT4, TSH, AST, ALT, LDH, GGT, ALKPHOS, BILITOT, BILIDIR, AMMONIA, AMYLASE, LIPASE, LACTATE, CHOL, HDL, LDLCHOLESTEROL, CHOLHDLRATIO, TRIG, VLDL, BNP, TROPONINI, CKTOTAL, CKMB, CKMBINDEX in the last 72 hours.     Ct Abdomen Pelvis Wo Contrast Additional Contrast? Oral And Rectal (water Soluble Contrast Through G Tube And Rectal)    Result Date: 9/25/2020  EXAMINATION: CT OF THE ABDOMEN AND PELVIS WITHOUT CONTRAST 9/23/2020 12:29 am TECHNIQUE: CT of the abdomen and pelvis was performed without the administration of intravenous contrast. Multiplanar reformatted images are provided for review. Dose modulation, iterative reconstruction, and/or weight based adjustment of the mA/kV was utilized to reduce the radiation dose to as low as reasonably achievable. COMPARISON: 09/22/2020 from 4 hours prior and 09/07/2020 HISTORY: ORDERING SYSTEM PROVIDED HISTORY: rule out leak from small bowel anastomosis vs colocolonic anastomosis. TECHNOLOGIST PROVIDED HISTORY: Gastrografin through G-tube and rectally. rule out leak from small bowel anastomosis vs colocolonic anastomosis. Reason for Exam: recent colon surgery Patient is 3 weeks postop status post left nephrectomy and partial colon resection for renal cell carcinoma FINDINGS: Lower Chest: Again seen is a small left effusion and left basilar atelectasis. Organs: The solid organs are incompletely evaluated without intravenous contrast.  Status post left nephrectomy. A right nephroureteral stent is present in appropriate position. The liver, spleen, pancreas, and adrenal glands are without acute findings. GI/Bowel: Status post administration of oral and rectal contrast.  A percutaneous gastrostomy tube is seen within the stomach. There appears to be leakage of contrast from the colonic anastomosis (series 2, image 144). An adjacent collection of fluid and gas appears to be extraluminal and intraperitoneal, measuring approximately 11 x 11 x 4.1 cm, but likely at least in part free-flowing. Pelvis: The urinary bladder is partially distended without contour abnormality. No acute abnormality of the prostate or seminal vesicles. Peritoneum/Retroperitoneum: As before, there is extensive intraperitoneal free fluid and moderate pneumoperitoneum. Infiltration of the omentum and left retroperitoneum is likely reactive.  Bones/Soft Tissues: Heterogeneous appearance of the osseous structures slightly limits evaluation for aggressive osseous lesions. No obvious osseous metastases are identified. No acute bony abnormality. There is a healing midline surgical wound without evidence of complication. 1. Leakage of contrast at the colocolonic anastomosis with a large adjacent collection of fluid and gas. 2. Reactive infiltration of the omentum and left retroperitoneum. Ct Abdomen Pelvis Wo Contrast Additional Contrast? None    Result Date: 9/22/2020  EXAMINATION: CT OF THE ABDOMEN AND PELVIS WITHOUT CONTRAST 9/22/2020 7:39 pm TECHNIQUE: CT of the abdomen and pelvis was performed without the administration of intravenous contrast. Multiplanar reformatted images are provided for review. Dose modulation, iterative reconstruction, and/or weight based adjustment of the mA/kV was utilized to reduce the radiation dose to as low as reasonably achievable. COMPARISON: 09/07/2020 HISTORY: ORDERING SYSTEM PROVIDED HISTORY: Recent nephrectomy and bowel anastomoses, left flank and abdominal pain, nausea vomiting TECHNOLOGIST PROVIDED HISTORY: Recent nephrectomy and bowel anastomoses, left flank and abdominal pain, nausea vomiting Reason for Exam: recent nephrectomy and bowel anastomoses, left flank and abdominal pain, nausea/vomiting Acuity: Unknown Type of Exam: Unknown FINDINGS: Lower Chest: Small left pleural effusion adjacent left lower lobe airspace disease, likely atelectasis. Subtle right basilar atelectasis/scarring. Base the heart is normal in size without pericardial fluid collection. Organs: Perihepatic fluid noted. No focal hepatic lesions. The gallbladder, pancreas, and spleen are grossly stable. There are new foci of intraperitoneal air surrounding liver and spleen. GI/Bowel: Status post partial colectomy and left lower quadrant and anastomosis. No obvious bowel obstruction identified, however there is increasing free intraperitoneal air as compared to prior study of 09/07/2020.  Correlation for interval surgical instrumentation recommended. Small amount of pelvic free fluid and fluid within the right pericolic gutter. Persistent ill-defined patchy mesenteric edema identified primarily in the left lower quadrant. Pelvis: Small amount of pelvic free fluid. Peritoneum/Retroperitoneum: Status post left nephrectomy. Adrenal glands are normal in size and configuration. Right-sided ureteral stent noted in place. Urinary bladder is otherwise grossly unremarkable. Bones/Soft Tissues: Advanced multilevel degenerative disc disease essentially throughout the visualized thoracolumbar spine. Subcortical endplate sclerosis and irregularity within multiple lower lumbar spine vertebral bodies. Advanced facet arthropathy within the mid to lower lumbar spine. Bilateral severe neural foraminal stenosis at L4-L5 and probably also at L3 through S1. Increasing free intraperitoneal air and fluid as compared to previous study of 09/07/2020. No obvious bowel obstruction or inflammation identified. Correlate for interval surgical instrumentation to the abdomen. Multiple additional chronic findings as described above. Xr Chest Portable    Result Date: 9/23/2020  EXAMINATION: ONE XRAY VIEW OF THE CHEST 9/23/2020 11:21 am COMPARISON: 09/11/2020 HISTORY: ORDERING SYSTEM PROVIDED HISTORY: Tachycardia TECHNOLOGIST PROVIDED HISTORY: Tachycardia Reason for Exam: tachycardia FINDINGS: Monitor wires project over the thorax. Hypoinflated lungs. Pulmonary vascular congestion versus vascular crowding. Left basilar patchy opacity. No pneumothorax. Heart size and mediastinal contours are stable. Hypoinflated lungs. Left basilar atelectasis or infiltrate. Pulmonary vascular congestion versus vascular crowding.         Treatments: see chart for full details      Disposition: Home or Self Care    Patient Instructions:      Medication List      START taking these medications    apixaban 5 MG Tabs tablet  Commonly known as: ELIQUIS  Take 1 tablet by mouth 2 times daily     bisacodyl 10 MG suppository  Commonly known as:  DULCOLAX  Place 1 suppository rectally daily     dilTIAZem 60 MG tablet  Commonly known as:  CARDIZEM  Take 1 tablet by mouth every 12 hours     lidocaine 4 % external patch  Place 2 patches onto the skin daily     methocarbamol 750 MG tablet  Commonly known as:  Robaxin-750  Take 1 tablet by mouth every 6 hours for 7 days     therapeutic multivitamin-minerals tablet  Take 1 tablet by mouth daily        CHANGE how you take these medications    cyclobenzaprine 10 MG tablet  Commonly known as:  FLEXERIL  Take 1 tablet by mouth 3 times daily as needed for Muscle spasms  What changed:  when to take this        CONTINUE taking these medications    acetaminophen 500 MG tablet  Commonly known as:  Tylenol  Take 2 tablets by mouth every 6 hours as needed for Pain     allopurinol 100 MG tablet  Commonly known as:  ZYLOPRIM     aspirin 81 MG tablet     blood glucose test strips  Test 4 times a day & as needed for symptoms of irregular blood glucose. Dispense sufficient amount for indicated testing frequency plus additional to accommodate PRN testing needs. ferrous sulfate 325 (65 Fe) MG tablet  Commonly known as:  IRON 325  Take 1 tablet by mouth daily (with breakfast)     gabapentin 300 MG capsule  Commonly known as:  NEURONTIN  Take 1 capsule by mouth 3 times daily for 7 days.      hydrALAZINE 25 MG tablet  Commonly known as:  APRESOLINE     insulin lispro (1 Unit Dial) 100 UNIT/ML Sopn  Commonly known as:  HumaLOG KwikPen  Inject 0-6 Units into the skin 3 times daily (before meals) **Corrective Low Dose Algorithm**  Glucose: Dose:               No Insulin  140-199 1 Unit  200-249 2 Units  250-299 3 Units  300-349 4 Units  350-399 5 Units  Over 399 6 Units     metoprolol tartrate 50 MG tablet  Commonly known as:  LOPRESSOR     pantoprazole 40 MG tablet  Commonly known as:  PROTONIX  Take 1 tablet by mouth daily pravastatin 20 MG tablet  Commonly known as:  PRAVACHOL     sodium bicarbonate 650 MG tablet  Take 1 tablet by mouth 2 times daily     traMADol 50 MG tablet  Commonly known as:  ULTRAM     vitamin C 500 MG tablet  Commonly known as:  ASCORBIC ACID     zolpidem 10 MG tablet  Commonly known as:  AMBIEN        STOP taking these medications    Alcohol Swabs Pads     glucose monitoring kit monitoring kit     [employer] Pen Needles 31G X 6 MM Misc  Generic drug:  Insulin Pen Needle     Lancets Misc     Lantus SoloStar 100 UNIT/ML injection pen  Generic drug:  insulin glargine     Melatonin 10 MG Tabs        ASK your doctor about these medications    oxyCODONE 5 MG immediate release tablet  Commonly known as:  Roxicodone  Take 1 tablet by mouth every 6 hours as needed for Pain for up to 3 days. Intended supply: 3 days. Take lowest dose possible to manage pain  Ask about: Should I take this medication?            Where to Get Your Medications      These medications were sent to [facility] [address] [address]    Phone:  [phone]   · apixaban 5 MG Tabs tablet  · bisacodyl 10 MG suppository  · dilTIAZem 60 MG tablet  · lidocaine 4 % external patch  · therapeutic multivitamin-minerals tablet     You can get these medications from any pharmacy    Bring a paper prescription for each of these medications  · methocarbamol 750 MG tablet  · oxyCODONE 5 MG immediate release tablet        Activity: activity as tolerated  Diet: regular    Kristin Gibbs DO  [address] [facility] [address] [phone]    In 10 days  For wound re-check    [address] [facility] [address] [address] [phone]           Signed:  Vlad Love  10/9/2020  9:40 PM

## 2020-10-26 ENCOUNTER — HOSPITAL ENCOUNTER (OUTPATIENT)
Dept: PREADMISSION TESTING | Age: 60
Setting detail: SPECIMEN
Discharge: HOME | End: 2020-10-30
Payer: COMMERCIAL

## 2020-10-26 PROCEDURE — U0003 INFECTIOUS AGENT DETECTION BY NUCLEIC ACID (DNA OR RNA); SEVERE ACUTE RESPIRATORY SYNDROME CORONAVIRUS 2 (SARS-COV-2) (CORONAVIRUS DISEASE [COVID-19]), AMPLIFIED PROBE TECHNIQUE, MAKING USE OF HIGH THROUGHPUT TECHNOLOGIES AS DESCRIBED BY CMS-2020-01-R: HCPCS

## 2020-10-27 LAB — SARS-COV-2, NAA: NOT DETECTED

## 2020-10-28 ENCOUNTER — TELEPHONE (OUTPATIENT)
Dept: PRIMARY CARE CLINIC | Age: 60
End: 2020-10-28

## 2020-10-28 RX ORDER — METHOCARBAMOL 750 MG/1
1 TABLET, FILM COATED ORAL 4 TIMES DAILY
Status: ON HOLD | COMMUNITY
End: 2020-10-30 | Stop reason: ALTCHOICE

## 2020-10-28 RX ORDER — PEN NEEDLE, DIABETIC 31 GX5/16"
NEEDLE, DISPOSABLE MISCELLANEOUS
COMMUNITY
Start: 2020-09-11 | End: 2021-09-10

## 2020-10-28 RX ORDER — BENZALKONIUM CHLORIDE 1.3 MG/ML
CLOTH TOPICAL
COMMUNITY
Start: 2020-01-28

## 2020-10-28 RX ORDER — INSULIN ASPART 100 [IU]/ML
INJECTION, SOLUTION INTRAVENOUS; SUBCUTANEOUS
COMMUNITY
Start: 2020-09-11 | End: 2021-09-10

## 2020-10-28 RX ORDER — CEPHALEXIN 500 MG/1
1 TABLET ORAL 2 TIMES DAILY
Status: ON HOLD | COMMUNITY
Start: 2020-10-27 | End: 2020-10-30 | Stop reason: HOSPADM

## 2020-10-28 RX ORDER — PHENOL 1.4 %
1 AEROSOL, SPRAY (ML) MUCOUS MEMBRANE NIGHTLY PRN
COMMUNITY

## 2020-10-30 ENCOUNTER — ANESTHESIA EVENT (OUTPATIENT)
Dept: OPERATING ROOM | Age: 60
End: 2020-10-30
Payer: COMMERCIAL

## 2020-10-30 ENCOUNTER — HOSPITAL ENCOUNTER (OUTPATIENT)
Age: 60
Setting detail: OUTPATIENT SURGERY
Discharge: HOME | End: 2020-10-30
Attending: UROLOGY | Admitting: UROLOGY
Payer: COMMERCIAL

## 2020-10-30 ENCOUNTER — APPOINTMENT (OUTPATIENT)
Dept: GENERAL RADIOLOGY | Age: 60
End: 2020-10-30
Attending: UROLOGY
Payer: COMMERCIAL

## 2020-10-30 ENCOUNTER — ANESTHESIA (OUTPATIENT)
Dept: OPERATING ROOM | Age: 60
End: 2020-10-30
Payer: COMMERCIAL

## 2020-10-30 VITALS — OXYGEN SATURATION: 100 % | TEMPERATURE: 98.2 F | SYSTOLIC BLOOD PRESSURE: 131 MMHG | DIASTOLIC BLOOD PRESSURE: 93 MMHG

## 2020-10-30 VITALS
SYSTOLIC BLOOD PRESSURE: 126 MMHG | OXYGEN SATURATION: 100 % | HEIGHT: 71 IN | DIASTOLIC BLOOD PRESSURE: 81 MMHG | HEART RATE: 89 BPM | RESPIRATION RATE: 16 BRPM | BODY MASS INDEX: 30.19 KG/M2 | TEMPERATURE: 97.7 F | WEIGHT: 215.61 LBS

## 2020-10-30 LAB
GFR NON-AFRICAN AMERICAN: 42 ML/MIN
GFR SERPL CREATININE-BSD FRML MDRD: 51 ML/MIN
GFR SERPL CREATININE-BSD FRML MDRD: ABNORMAL ML/MIN/{1.73_M2}
GLUCOSE BLD-MCNC: 182 MG/DL (ref 75–110)
GLUCOSE BLD-MCNC: 205 MG/DL (ref 74–100)
POC CREATININE: 1.67 MG/DL (ref 0.51–1.19)

## 2020-10-30 PROCEDURE — 2580000003 HC RX 258: Performed by: UROLOGY

## 2020-10-30 PROCEDURE — 3600000013 HC SURGERY LEVEL 3 ADDTL 15MIN: Performed by: UROLOGY

## 2020-10-30 PROCEDURE — 2709999900 HC NON-CHARGEABLE SUPPLY: Performed by: UROLOGY

## 2020-10-30 PROCEDURE — C1758 CATHETER, URETERAL: HCPCS | Performed by: UROLOGY

## 2020-10-30 PROCEDURE — 82947 ASSAY GLUCOSE BLOOD QUANT: CPT

## 2020-10-30 PROCEDURE — 3700000000 HC ANESTHESIA ATTENDED CARE: Performed by: UROLOGY

## 2020-10-30 PROCEDURE — C1769 GUIDE WIRE: HCPCS | Performed by: UROLOGY

## 2020-10-30 PROCEDURE — 6360000002 HC RX W HCPCS: Performed by: NURSE ANESTHETIST, CERTIFIED REGISTERED

## 2020-10-30 PROCEDURE — 3600000003 HC SURGERY LEVEL 3 BASE: Performed by: UROLOGY

## 2020-10-30 PROCEDURE — 87086 URINE CULTURE/COLONY COUNT: CPT

## 2020-10-30 PROCEDURE — 2500000003 HC RX 250 WO HCPCS: Performed by: NURSE ANESTHETIST, CERTIFIED REGISTERED

## 2020-10-30 PROCEDURE — 7100000001 HC PACU RECOVERY - ADDTL 15 MIN: Performed by: UROLOGY

## 2020-10-30 PROCEDURE — 3700000001 HC ADD 15 MINUTES (ANESTHESIA): Performed by: UROLOGY

## 2020-10-30 PROCEDURE — 82565 ASSAY OF CREATININE: CPT

## 2020-10-30 PROCEDURE — 2720000010 HC SURG SUPPLY STERILE: Performed by: UROLOGY

## 2020-10-30 PROCEDURE — 7100000040 HC SPAR PHASE II RECOVERY - FIRST 15 MIN: Performed by: UROLOGY

## 2020-10-30 PROCEDURE — 2580000003 HC RX 258: Performed by: ANESTHESIOLOGY

## 2020-10-30 PROCEDURE — 7100000000 HC PACU RECOVERY - FIRST 15 MIN: Performed by: UROLOGY

## 2020-10-30 PROCEDURE — 6360000002 HC RX W HCPCS: Performed by: PHYSICIAN ASSISTANT

## 2020-10-30 PROCEDURE — C2617 STENT, NON-COR, TEM W/O DEL: HCPCS | Performed by: UROLOGY

## 2020-10-30 PROCEDURE — 3209999900 FLUORO FOR SURGICAL PROCEDURES

## 2020-10-30 PROCEDURE — 7100000041 HC SPAR PHASE II RECOVERY - ADDTL 15 MIN: Performed by: UROLOGY

## 2020-10-30 DEVICE — URETERAL STENT
Type: IMPLANTABLE DEVICE | Status: FUNCTIONAL
Brand: POLARIS™ ULTRA

## 2020-10-30 RX ORDER — CIPROFLOXACIN 2 MG/ML
400 INJECTION, SOLUTION INTRAVENOUS
Status: COMPLETED | OUTPATIENT
Start: 2020-10-30 | End: 2020-10-30

## 2020-10-30 RX ORDER — ONDANSETRON 2 MG/ML
4 INJECTION INTRAMUSCULAR; INTRAVENOUS
Status: DISCONTINUED | OUTPATIENT
Start: 2020-10-30 | End: 2020-10-30 | Stop reason: HOSPADM

## 2020-10-30 RX ORDER — PROPOFOL 10 MG/ML
INJECTION, EMULSION INTRAVENOUS PRN
Status: DISCONTINUED | OUTPATIENT
Start: 2020-10-30 | End: 2020-10-30 | Stop reason: SDUPTHER

## 2020-10-30 RX ORDER — MAGNESIUM HYDROXIDE 1200 MG/15ML
LIQUID ORAL CONTINUOUS PRN
Status: COMPLETED | OUTPATIENT
Start: 2020-10-30 | End: 2020-10-30

## 2020-10-30 RX ORDER — SODIUM CHLORIDE 0.9 % (FLUSH) 0.9 %
10 SYRINGE (ML) INJECTION PRN
Status: DISCONTINUED | OUTPATIENT
Start: 2020-10-30 | End: 2020-10-30 | Stop reason: HOSPADM

## 2020-10-30 RX ORDER — SODIUM CHLORIDE, SODIUM LACTATE, POTASSIUM CHLORIDE, CALCIUM CHLORIDE 600; 310; 30; 20 MG/100ML; MG/100ML; MG/100ML; MG/100ML
INJECTION, SOLUTION INTRAVENOUS CONTINUOUS
Status: DISCONTINUED | OUTPATIENT
Start: 2020-10-30 | End: 2020-10-30 | Stop reason: HOSPADM

## 2020-10-30 RX ORDER — ONDANSETRON 2 MG/ML
4 INJECTION INTRAMUSCULAR; INTRAVENOUS ONCE
Status: DISCONTINUED | OUTPATIENT
Start: 2020-10-30 | End: 2020-10-30 | Stop reason: HOSPADM

## 2020-10-30 RX ORDER — FENTANYL CITRATE 50 UG/ML
25 INJECTION, SOLUTION INTRAMUSCULAR; INTRAVENOUS ONCE
Status: DISCONTINUED | OUTPATIENT
Start: 2020-10-30 | End: 2020-10-30 | Stop reason: HOSPADM

## 2020-10-30 RX ORDER — MIDAZOLAM HYDROCHLORIDE 1 MG/ML
2 INJECTION INTRAMUSCULAR; INTRAVENOUS
Status: DISCONTINUED | OUTPATIENT
Start: 2020-10-30 | End: 2020-10-30 | Stop reason: HOSPADM

## 2020-10-30 RX ORDER — TAMSULOSIN HYDROCHLORIDE 0.4 MG/1
0.4 CAPSULE ORAL DAILY
Qty: 30 CAPSULE | Refills: 1 | Status: SHIPPED | OUTPATIENT
Start: 2020-10-30 | End: 2020-12-16

## 2020-10-30 RX ORDER — FENTANYL CITRATE 50 UG/ML
50 INJECTION, SOLUTION INTRAMUSCULAR; INTRAVENOUS EVERY 5 MIN PRN
Status: DISCONTINUED | OUTPATIENT
Start: 2020-10-30 | End: 2020-10-30 | Stop reason: HOSPADM

## 2020-10-30 RX ORDER — FENTANYL CITRATE 50 UG/ML
INJECTION, SOLUTION INTRAMUSCULAR; INTRAVENOUS PRN
Status: DISCONTINUED | OUTPATIENT
Start: 2020-10-30 | End: 2020-10-30 | Stop reason: SDUPTHER

## 2020-10-30 RX ORDER — HYDROCODONE BITARTRATE AND ACETAMINOPHEN 5; 325 MG/1; MG/1
1 TABLET ORAL EVERY 6 HOURS PRN
Qty: 20 TABLET | Refills: 0 | Status: SHIPPED | OUTPATIENT
Start: 2020-10-30 | End: 2020-11-06

## 2020-10-30 RX ORDER — LIDOCAINE HYDROCHLORIDE 10 MG/ML
INJECTION, SOLUTION EPIDURAL; INFILTRATION; INTRACAUDAL; PERINEURAL PRN
Status: DISCONTINUED | OUTPATIENT
Start: 2020-10-30 | End: 2020-10-30 | Stop reason: SDUPTHER

## 2020-10-30 RX ORDER — SODIUM CHLORIDE 0.9 % (FLUSH) 0.9 %
10 SYRINGE (ML) INJECTION EVERY 12 HOURS SCHEDULED
Status: DISCONTINUED | OUTPATIENT
Start: 2020-10-30 | End: 2020-10-30 | Stop reason: HOSPADM

## 2020-10-30 RX ORDER — CIPROFLOXACIN 500 MG/1
500 TABLET, FILM COATED ORAL 2 TIMES DAILY
Qty: 6 TABLET | Refills: 0 | Status: SHIPPED | OUTPATIENT
Start: 2020-10-30 | End: 2020-11-02

## 2020-10-30 RX ADMIN — CIPROFLOXACIN 400 MG: 2 INJECTION, SOLUTION INTRAVENOUS at 13:05

## 2020-10-30 RX ADMIN — FENTANYL CITRATE 50 MCG: 50 INJECTION INTRAMUSCULAR; INTRAVENOUS at 12:55

## 2020-10-30 RX ADMIN — SODIUM CHLORIDE, POTASSIUM CHLORIDE, SODIUM LACTATE AND CALCIUM CHLORIDE: 600; 310; 30; 20 INJECTION, SOLUTION INTRAVENOUS at 12:09

## 2020-10-30 RX ADMIN — PROPOFOL 200 MG: 10 INJECTION, EMULSION INTRAVENOUS at 12:55

## 2020-10-30 RX ADMIN — LIDOCAINE HYDROCHLORIDE 50 MG: 10 INJECTION, SOLUTION EPIDURAL; INFILTRATION; INTRACAUDAL; PERINEURAL at 12:55

## 2020-10-30 ASSESSMENT — PULMONARY FUNCTION TESTS
PIF_VALUE: 10
PIF_VALUE: 2
PIF_VALUE: 0
PIF_VALUE: 1
PIF_VALUE: 3
PIF_VALUE: 2
PIF_VALUE: 7
PIF_VALUE: 2
PIF_VALUE: 8
PIF_VALUE: 9
PIF_VALUE: 9
PIF_VALUE: 2
PIF_VALUE: 8
PIF_VALUE: 2
PIF_VALUE: 1
PIF_VALUE: 2
PIF_VALUE: 8
PIF_VALUE: 0
PIF_VALUE: 8
PIF_VALUE: 0
PIF_VALUE: 8
PIF_VALUE: 5
PIF_VALUE: 10
PIF_VALUE: 3
PIF_VALUE: 8
PIF_VALUE: 8
PIF_VALUE: 0
PIF_VALUE: 2
PIF_VALUE: 2
PIF_VALUE: 0
PIF_VALUE: 5
PIF_VALUE: 10
PIF_VALUE: 4
PIF_VALUE: 4
PIF_VALUE: 9

## 2020-10-30 ASSESSMENT — PAIN SCALES - GENERAL
PAINLEVEL_OUTOF10: 3
PAINLEVEL_OUTOF10: 2
PAINLEVEL_OUTOF10: 3
PAINLEVEL_OUTOF10: 3

## 2020-10-30 ASSESSMENT — PAIN - FUNCTIONAL ASSESSMENT: PAIN_FUNCTIONAL_ASSESSMENT: 0-10

## 2020-10-30 ASSESSMENT — PAIN DESCRIPTION - DESCRIPTORS: DESCRIPTORS: ACHING;CONSTANT

## 2020-10-30 NOTE — ANESTHESIA POSTPROCEDURE EVALUATION
Department of Anesthesiology  Postprocedure Note    Patient: Stevenson Farias  MRN: [id number]  YOB: 1960  Date of evaluation: 10/30/2020  Time:  2:18 PM     Procedure Summary     Date:  10/30/20 Room / Location:  [address]    Anesthesia Start:  1267 Anesthesia Stop:  0616    Procedure:  HOLMIUM-STANDBY, CYSTOSCOPY, URETEROSCOPY, LASER LITHO, STENT EXCHANGE (Right ) Diagnosis:  (RIGHT KIDNEY STONE)    Surgeon:  Suzanne Reza MD Responsible Provider:  Frieda Guallpa MD    Anesthesia Type:  general ASA Status:  3          Anesthesia Type: general    Destiny Phase I: Destiny Score: 9    Destiny Phase II:      Last vitals: Reviewed and per EMR flowsheets.        Anesthesia Post Evaluation    Patient location during evaluation: PACU  Patient participation: complete - patient participated  Level of consciousness: awake  Pain score: 3  Airway patency: patent  Nausea & Vomiting: no vomiting and no nausea  Complications: no  Cardiovascular status: blood pressure returned to baseline  Respiratory status: acceptable  Hydration status: euvolemic

## 2020-10-30 NOTE — H&P
Pre-op History and Physical  [address] [employer] [address]rez    Patient:  Sendy Hernandez  MRN: [id number]  YOB: 1960    HISTORY OF PRESENT ILLNESS:     The patient is a 56 y.o. male who presents with right ureteral stent placed for stone by dr Anca Link on 9/1/20. Next day 9/2/20 he did have left nephrectomy with Dr Lazarus Brown. Here for cystoscopy, ureteroscopy, holmium laser lithotripsy with stent exchange, RIGHT. Patient's old records, notes and chart reviewed and summarized above. [address] [employer] [facility] Manjarrez independently reviewed the images and verified the radiology reports from:    No results found. Past Medical History:    Past Medical History:   Diagnosis Date    Arthritis     BILATERAL KNEES    Back pain     Cellulitis     Colostomy RUQ 09/23/2020    Frequent headaches     10/28/2020 PATIENT STATES EVERY OTHER DAY.  Gout     H/O left nephrectomy 09/02/2020    History of atrial fibrillation     AFTER SURGERY ON 09/23/2020 WENT INTO A FIB. CONVERTED BACK TO NORMAL RHYTHM ON HIS OWN. CARDIOLOGIST DR Evelio Lim History of blood transfusion     NO REACTION    [place] (hard of hearing)     HTN     Hyperlipidemia     Sleep apnea     USES CPAP MACHINE    T2DM     Tooth missing     RIGHT UPPER 2ND MOLAR    Uses walker     FOR LONG DISTANCES    Wears glasses     READING       Past Surgical History:    Past Surgical History:   Procedure Laterality Date    ABDOMEN SURGERY  09/02/2020    EXPLORATORY LAPAROTOMY. RESECTION PROXIMAL JEJUNUM AND DESCENDING COLON. LEFT NEPHRECTOMY G TUBE PLACEMENT     ABDOMEN SURGERY  09/23/2020    EXPLORATORY LAPAROTOMY/COLOSTOMY CREATION.     ABDOMINAL EXPLORATION SURGERY  09/02/2020    EXPLORATORY LAPAROTOMY BOWEL RESECTION     ADENOIDECTOMY      TWICE AS A CHILD    ANKLE SURGERY Right 2010    RUPTURED ACHILES    CARPAL TUNNEL RELEASE Bilateral     CYSTOSCOPY Right 9/1/2020    CYSTOSCOPY RIGHT URETERAL STENT INSERTION performed by Alban Valero MD Dee Romberg, MD   aspirin 81 MG tablet Take 81 mg by mouth nightly STOPPED TAKING 10/28/2020    Historical Provider, MD       Allergies:  Ampicillin; Pcn [penicillins]; Sulfa antibiotics; and Tape Jagjit Jairo tape]    Social History:    Social History     Socioeconomic History    Marital status:      Spouse name: Not on file    Number of children: Not on file    Years of education: Not on file    Highest education level: Not on file   Occupational History    Not on file   Social Needs    Financial resource strain: Not on file    Food insecurity     Worry: Not on file     Inability: Not on file    Transportation needs     Medical: Not on file     Non-medical: Not on file   Tobacco Use    Smoking status: Never Smoker    Smokeless tobacco: Former User     Types: Chew   Substance and Sexual Activity    Alcohol use: Not Currently     Alcohol/week: 0.0 standard drinks    Drug use: No    Sexual activity: Not on file   Lifestyle    Physical activity     Days per week: Not on file     Minutes per session: Not on file    Stress: Not on file   Relationships    Social connections     Talks on phone: Not on file     Gets together: Not on file     Attends Taoism service: Not on file     Active member of club or organization: Not on file     Attends meetings of clubs or organizations: Not on file     Relationship status: Not on file    Intimate partner violence     Fear of current or ex partner: Not on file     Emotionally abused: Not on file     Physically abused: Not on file     Forced sexual activity: Not on file   Other Topics Concern    Not on file   Social History Narrative    Not on file       Family History:    Family History   Problem Relation Age of Onset    Stroke Maternal Grandmother     Stroke Maternal Grandfather     Other Mother         AORTIC BYPASS LED TO KIDNEY FAILURE    Kidney Disease Mother     Diabetes Father        REVIEW OF SYSTEMS:  Constitutional: negative  Eyes: negative  Respiratory: negative  Cardiovascular: negative  Gastrointestinal: negative  Genitourinary: no acute issues  Musculoskeletal: negative  Skin: negative   Neurological: negative  Hematological/Lymphatic: negative  Psychological: negative    PHYSICAL EXAM:    No data found. Constitutional: Patient in NAD  Neuro: Alert and oriented to person, place, and time  Psych: Mood and affect normal  Skin: Clean, dry, intact   Lungs: Respiratory effort normal, CTA  Cardiovascular:  Normal peripheral pulses; no murmur. Normal rhythm  Abdomen: Soft, non-tender, non-distended, no hepatosplenomegaly or hernia, CVA tenderness right mild  Bladder: Non-tender and non-disdended   : Non-tender, skin intact, no lesions       LABS:   No results for input(s): WBC, HGB, HCT, MCV, PLT in the last 72 hours. No results for input(s): NA, K, CL, CO2, PHOS, BUN, CREATININE in the last 72 hours. Invalid input(s): CA  No results found for: PSA      Urinalysis: No results for input(s): COLORU, PHUR, LABCAST, WBCUA, RBCUA, MUCUS, TRICHOMONAS, YEAST, BACTERIA, CLARITYU, SPECGRAV, LEUKOCYTESUR, UROBILINOGEN, Zollie Batres in the last 72 hours. Invalid input(s): NITRATE, GLUCOSEUKETONESUAMORPHOUS     -----------------------------------------------------------------    ASSESSMENT AND PLAN:    Impression:    Right ureteral stent and stone    Plan: cystoscopy, ureteroscopy, holmium laser lithotripsy with stent placement, RIGHT in OR today. Consent obtained    The patient was counseled at length about the risks of mary Covid-19 during their perioperative period and any recovery window from their procedure. The patient was made aware that mary Covid-19  may worsen their prognosis for recovering from their procedure  and lend to a higher morbidity and/or mortality risk. All material risks, benefits, and reasonable alternatives including postponing the procedure were discussed.  The patient does wish to proceed with the procedure at this time.     Jonh Prince PA-C  11:22 AM 10/30/2020

## 2020-10-30 NOTE — ANESTHESIA PRE PROCEDURE
Department of Anesthesiology  Preprocedure Note       Name:  Dori Gutierrez II   Age:  56 y.o.  :  1960                                          MRN:  [id number]         Date:  10/30/2020      Surgeon: Ang Srinivasan):  Thomas Garner MD    Procedure: Procedure(s):  HOLMIUM- CYSTOSCOPY, URETEROSCOPY, LASER LITHO, STENT EXCHANGE    Medications prior to admission:   Prior to Admission medications    Medication Sig Start Date End Date Taking?  Authorizing Provider   Cephalexin 500 MG TABS Take 1 tablet by mouth 2 times daily 10/27/20   Historical Provider, MD   CPAP Machine MISC use as directed    Historical Provider, MD   NOVOLOG FLEXPEN 100 UNIT/ML injection pen Inject into the skin 3 times daily (before meals) SLIDING SCALE: BLOOD SUGAR =NO INSULIN.   140-199=1 UNIT,  200-249=2 UNITS,  250-299=3 UNITS,  300-349= 4 UNITS,  350-399=5 UNITS,  >399= 6 UNITS 20   Historical Provider, MD NG UF III MINI PEN NEEDLES 31G X 5 MM MISC  20   Historical Provider, MD   Respiratory Therapy Supplies (CARETOUCH CPAP & BIPAP HOSE) MISC Mask and Tubing 20   Historical Provider, MD   Melatonin 10 MG TABS Take 1 tablet by mouth nightly as needed    Historical Provider, MD   apixaban (ELIQUIS) 5 MG TABS tablet Take 1 tablet by mouth 2 times daily  Patient taking differently: Take 5 mg by mouth 2 times daily STOPPED TAKING 10- 10/2/20   Irish Bernabe MD   dilTIAZem (CARDIZEM) 60 MG tablet Take 1 tablet by mouth every 12 hours 10/2/20   Irish Bernabe MD   lidocaine 4 % external patch Place 2 patches onto the skin daily  Patient taking differently: Place 2 patches onto the skin daily NOT TAKING 10/3/20   Irish Bernabe MD   Multiple Vitamins-Minerals (THERAPEUTIC MULTIVITAMIN-MINERALS) tablet Take 1 tablet by mouth daily 10/3/20   Irish Bernabe MD   ferrous sulfate (IRON 325) 325 (65 Fe) MG tablet Take 1 tablet by mouth daily (with breakfast) 20   Henry Gregory MD   pantoprazole (PROTONIX) 40 MG tablet injection 4 mg  4 mg Intravenous Once PRN Skye Vera MD           Allergies: Allergies   Allergen Reactions    Ampicillin Swelling     Swelling of throat.  Pcn [Penicillins] Swelling     Throat swelling. Tolerated cefepime during 8/31/20 admission.  Sulfa Antibiotics Other (See Comments)     Other reaction(s): Unknown    Tape [Adhesive Tape] Other (See Comments)     CAUSES REDNESS. PAPER TAPE OK. Problem List:    Patient Active Problem List   Diagnosis Code    Cellulitis L03.90    Type 2 diabetes mellitus without complication ([facility] [address] A98.9    Essential hypertension I10    Morbid obesity due to excess calories ([facility]) E66.01    Chronic kidney disease (CKD) N18.9    DEBBI (acute kidney injury) ([facility] [address] N17.9    Bowel obstruction ([facility]) K56.609    Small bowel obstruction ([facility]) K56.609    Moderate malnutrition ([facility] [address] E44.0    Hyperglycemia R73.9    Renal cell cancer ([facility] [address] C64.9       Past Medical History:        Diagnosis Date    Arthritis     BILATERAL KNEES    Back pain     Cellulitis     Colostomy RUQ 09/23/2020    Frequent headaches     10/28/2020 PATIENT STATES EVERY OTHER DAY.  Gout     H/O left nephrectomy 09/02/2020    History of atrial fibrillation     AFTER SURGERY ON 09/23/2020 WENT INTO A FIB. CONVERTED BACK TO NORMAL RHYTHM ON HIS OWN. CARDIOLOGIST DR Susan Brown History of blood transfusion     NO REACTION    [place] (hard of hearing)     HTN     Hyperlipidemia     Sleep apnea     USES CPAP MACHINE    T2DM     Tooth missing     RIGHT UPPER 2ND MOLAR    Uses walker     FOR LONG DISTANCES    Wears glasses     READING       Past Surgical History:        Procedure Laterality Date    ABDOMEN SURGERY  09/02/2020    EXPLORATORY LAPAROTOMY. RESECTION PROXIMAL JEJUNUM AND DESCENDING COLON. LEFT NEPHRECTOMY G TUBE PLACEMENT     ABDOMEN SURGERY  09/23/2020    EXPLORATORY LAPAROTOMY/COLOSTOMY CREATION.     ABDOMINAL EXPLORATION SURGERY  09/02/2020    EXPLORATORY LAPAROTOMY BOWEL RESECTION     ADENOIDECTOMY      TWICE AS A CHILD    ANKLE SURGERY Right 2010    RUPTURED ACHILES    CARPAL TUNNEL RELEASE Bilateral     CYSTOSCOPY Right 9/1/2020    CYSTOSCOPY RIGHT URETERAL STENT INSERTION performed by Woodrow Cortez MD at [address] [facility] CATH POWER PICC TRIPLE  09/03/2020    REMOVED AROUND 10/07/2020    KIDNEY SURGERY Left 9/2/2020    LAPAROSCOPIC XI ROBOTIC NEPHRECTOMY performed by Bhargav Gutierrez MD at [address] [facility] ARTHROSCOPY Bilateral     KNEE SURGERY Bilateral     LAPAROTOMY N/A 9/23/2020    LAPAROTOMY EXPLORATORY performed by Isaura Velazquez DO at [address] [address] N/A 9/2/2020    EXPLORATORY LAPAROTOMY, RESECTION OF PROXIMAL JEJUNUM AND DESCENDING COLON WITH MOBILIZATION OF SPLENIC FLEXURE AND PRIMARY ANASTAMOSISOF SMALL BOWEL AND COLON, PLACEMENT OF GASTROSTOMY TUBE performed by Isaura Velazquez DO at [address] [place] [address] 3 TIMES  AS A CHILD    TOTAL NEPHRECTOMY Left 09/02/2020    LAPAROSCOPIC XI ROBOTIC NEPHRECTOMY        Social History:    Social History     Tobacco Use    Smoking status: Never Smoker    Smokeless tobacco: Former User     Types: Chew   Substance Use Topics    Alcohol use: Not Currently     Alcohol/week: 0.0 standard drinks                                Counseling given: Not Answered      Vital Signs (Current): There were no vitals filed for this visit.                                            BP Readings from Last 3 Encounters:   10/02/20 122/89   09/23/20 105/85   09/17/20 137/88       NPO Status:                                                                                 BMI:   Wt Readings from Last 3 Encounters:   10/30/20 215 lb 9.8 oz (97.8 kg)   10/02/20 236 lb 1.8 oz (107.1 kg)   09/16/20 255 lb (115.7 kg)     There is no height or weight on file to calculate BMI.    CBC:   Lab Results   Component Value Date    WBC 8.8 10/01/2020    RBC 3.54 10/01/2020    HGB 9.4 10/01/2020    HCT 30.9 10/01/2020    MCV 87.3 10/01/2020    RDW 15.2 10/01/2020     10/01/2020       CMP:   Lab Results   Component Value Date     10/01/2020    K 3.4 10/01/2020     10/01/2020    CO2 22 10/01/2020    BUN 9 10/01/2020    CREATININE 1.11 10/01/2020    GFRAA >60 10/01/2020    LABGLOM >60 10/01/2020    GLUCOSE 139 10/01/2020    PROT 8.4 09/22/2020    CALCIUM 8.5 10/01/2020    BILITOT 0.47 09/22/2020    ALKPHOS 112 09/22/2020    AST 32 09/22/2020    ALT 21 09/22/2020       POC Tests:   No results for input(s): POCGLU, POCNA, POCK, POCCL, POCBUN, POCHEMO, POCHCT in the last 72 hours. Coags:   Lab Results   Component Value Date    PROTIME 12.2 09/29/2020    INR 1.2 09/29/2020    APTT 72.6 10/01/2020       HCG (If Applicable): No results found for: PREGTESTUR, PREGSERUM, HCG, HCGQUANT     ABGs:   Lab Results   Component Value Date    PHART 7.363 09/23/2020    PO2ART 187.0 09/23/2020    XSE5RUR 34.3 09/23/2020    KRL0DVJ 19.0 09/23/2020    K2JEKMCR 99.3 09/23/2020        Type & Screen (If Applicable):  No results found for: LABABO, LABRH    Drug/Infectious Status (If Applicable):  No results found for: HIV, HEPCAB    COVID-19 Screening (If Applicable):   Lab Results   Component Value Date    COVID19 Not Detected 10/26/2020         Anesthesia Evaluation  Patient summary reviewed no history of anesthetic complications:   Airway: Mallampati: II  TM distance: >3 FB   Neck ROM: full  Mouth opening: > = 3 FB Dental:          Pulmonary:Negative Pulmonary ROS and normal exam                               Cardiovascular:    (+) hypertension: no interval change,       ECG reviewed  Rhythm: regular  Rate: normal                    Neuro/Psych:   Negative Neuro/Psych ROS              GI/Hepatic/Renal: Neg GI/Hepatic/Renal ROS            Endo/Other:    (+) DiabetesType II DM, no interval change, using insulin, . Abdominal:   (+) obese,         Vascular: negative vascular ROS. Anesthesia Plan      general     ASA 3     (LMA)  Induction: intravenous and rapid sequence. MIPS: Postoperative opioids intended and Prophylactic antiemetics administered. Anesthetic plan and risks discussed with patient. Plan discussed with CRNA.     Attending anesthesiologist reviewed and agrees with James S Zhang Montoya MD   10/30/2020

## 2020-10-31 LAB
CULTURE: NO GROWTH
CULTURE: NORMAL
Lab: NORMAL
Lab: NORMAL
SPECIMEN DESCRIPTION: NORMAL
SPECIMEN DESCRIPTION: NORMAL

## 2020-11-02 ENCOUNTER — OFFICE VISIT (OUTPATIENT)
Dept: FAMILY MEDICINE CLINIC | Age: 60
End: 2020-11-02
Payer: COMMERCIAL

## 2020-11-02 VITALS
DIASTOLIC BLOOD PRESSURE: 62 MMHG | HEIGHT: 71 IN | HEART RATE: 121 BPM | BODY MASS INDEX: 30.15 KG/M2 | SYSTOLIC BLOOD PRESSURE: 112 MMHG | WEIGHT: 215.4 LBS | TEMPERATURE: 97.9 F | OXYGEN SATURATION: 98 %

## 2020-11-02 PROBLEM — G47.30 SLEEP APNEA: Status: ACTIVE | Noted: 2017-11-17

## 2020-11-02 LAB — HBA1C MFR BLD: 6.4 %

## 2020-11-02 PROCEDURE — 99215 OFFICE O/P EST HI 40 MIN: CPT | Performed by: NURSE PRACTITIONER

## 2020-11-02 PROCEDURE — 83036 HEMOGLOBIN GLYCOSYLATED A1C: CPT | Performed by: NURSE PRACTITIONER

## 2020-11-02 RX ORDER — DULOXETIN HYDROCHLORIDE 20 MG/1
20 CAPSULE, DELAYED RELEASE ORAL DAILY
Qty: 30 CAPSULE | Refills: 0 | Status: SHIPPED | OUTPATIENT
Start: 2020-11-02 | End: 2020-11-27

## 2020-11-02 RX ORDER — TRAMADOL HYDROCHLORIDE 50 MG/1
100 TABLET ORAL EVERY EVENING
Qty: 60 TABLET | Refills: 0 | Status: SHIPPED | OUTPATIENT
Start: 2020-11-02 | End: 2020-12-11 | Stop reason: SDUPTHER

## 2020-11-02 ASSESSMENT — ENCOUNTER SYMPTOMS
VOMITING: 0
DIARRHEA: 0
CHEST TIGHTNESS: 0
BLOOD IN STOOL: 0
COUGH: 0
SHORTNESS OF BREATH: 0
ABDOMINAL PAIN: 0
CONSTIPATION: 0
NAUSEA: 0
APNEA: 1
BACK PAIN: 1

## 2020-11-02 NOTE — PROGRESS NOTES
[address]  [place], [address]  [phone]      Sasha Mo II is a 56 y.o. male who presents today for his  medicalconditions/complaints as noted below. Bunny Evelyn is c/o of New Patient (est,dr Bhargavi Espinosa steroids almost 2 wks ago due to inflammation in left elbow,causing BSs to rise. on cipro for stent placement/urology) and Diabetes  . HPI:    HPI  Pt. Here to est. Care. He works at [employer], but has been off work since end of august this year d/t major surgery, bowel obstruction and recovery. He had large kidney infection earlier this summer which resulted in a nephrectomy and then had subsequent bowel obstruction about 1 month later causing him to need a colostomy and feeding tube. He is not needing the feeding tube and plans to have this removed next week. The colostomy is set to be removed hopefully in December, then he would like to go back to work. He has lost about 100 lbs since all of this and feels weak and deconditioned. He does have chronic back pain for which he takes 2 tramadol at night but weight loss has not helped this. He does have a physical job and worried about returning. He has completed PT in the past but no other meds besides tramadol for the past 10 years. He does also have recent kidney stone for which he has had a few norco for and has plans to have stent removed next week. He has been stable on all other meds for years with no SE. He has a lot of trouble sleeping and ambien sometimes works along with tramadol. He states his mind does not shut off. He has been diabetic for awhile and had been on [facility] with last pcp. He did not monitor his BG until recently when out of hospital. He has also been on steroids recently and readings have been high but done now with those. He is only on meal time insulin currently but maybe only checking BG once daily.  He has been drinking diabetic protein shakes and had no low BG. He would like to eventually get off insulin. He has sleep apnea and wears cpap nightly. He feels rested when wearing this and tolerates settings well. Past Medical History:   Diagnosis Date    Arthritis     BILATERAL KNEES    Back pain     Cellulitis     Colostomy RUQ 09/23/2020    Frequent headaches     10/28/2020 PATIENT STATES EVERY OTHER DAY.  Gout     H/O left nephrectomy 09/02/2020    History of atrial fibrillation     AFTER SURGERY ON 09/23/2020 WENT INTO A FIB. CONVERTED BACK TO NORMAL RHYTHM ON HIS OWN.  CARDIOLOGIST DR Estefanía Sweeney History of blood transfusion     NO REACTION    [place] (hard of hearing)     HTN     Hyperlipidemia     Sleep apnea     USES CPAP MACHINE    T2DM     Tooth missing     RIGHT UPPER 2ND MOLAR    Uses walker     FOR LONG DISTANCES    Wears glasses     READING      Past Surgical History:   Procedure Laterality Date    ADENOIDECTOMY      TWICE AS A CHILD    ANKLE SURGERY Right 2010    RUPTURED ACHILES    CARPAL TUNNEL RELEASE Bilateral     COLOSTOMY      temporary    CYSTO/URETERO/PYELOSCOPY, CALCULUS TX Right 10/30/2020    HOLMIUM-STANDBY, CYSTOSCOPY, URETEROSCOPY, STENT EXCHANGE performed by Shaina Barroso MD at [address] [place] [address] Right 9/1/2020    CYSTOSCOPY RIGHT URETERAL STENT INSERTION performed by Gabriel Parekh MD at [address] [place] [address] Right 10/30/2020     HOLMIUM- CYSTOSCOPY, URETEROSCOPY, LASER LITHO, STENT EXCHANGE     HC CATH POWER PICC TRIPLE  09/03/2020    REMOVED AROUND 10/07/2020    KIDNEY SURGERY Left 9/2/2020    LAPAROSCOPIC XI ROBOTIC NEPHRECTOMY performed by Charlie Desouza MD at [address] [place] [address] ARTHROSCOPY Bilateral     KNEE SURGERY Bilateral     LAPAROTOMY N/A 9/23/2020    LAPAROTOMY EXPLORATORY performed by Brian Leach DO at [address] [place] [address] N/A 9/2/2020    EXPLORATORY LAPAROTOMY, RESECTION OF PROXIMAL JEJUNUM AND DESCENDING COLON WITH MOBILIZATION OF SPLENIC FLEXURE AND PRIMARY ANASTAMOSISOF SMALL BOWEL AND COLON, PLACEMENT OF GASTROSTOMY TUBE performed by Teresa Belle DO at [address] [place] [address] 3 TIMES  AS A CHILD     Family History   Problem Relation Age of Onset    Stroke Maternal Grandmother     Stroke Maternal Grandfather     Other Mother         AORTIC BYPASS LED TO KIDNEY FAILURE    Kidney Disease Mother     Diabetes Father      Social History     Tobacco Use    Smoking status: Never Smoker    Smokeless tobacco: Former User     Types: Chew   Substance Use Topics    Alcohol use: Not Currently     Alcohol/week: 0.0 standard drinks      Current Outpatient Medications   Medication Sig Dispense Refill    DULoxetine (CYMBALTA) 20 MG extended release capsule Take 1 capsule by mouth daily 30 capsule 0    traMADol (ULTRAM) 50 MG tablet Take 2 tablets by mouth every evening for 30 days. 60 tablet 0    HYDROcodone-acetaminophen (NORCO) 5-325 MG per tablet Take 1 tablet by mouth every 6 hours as needed for Pain for up to 7 days.  20 tablet 0    tamsulosin (FLOMAX) 0.4 MG capsule Take 1 capsule by mouth daily 30 capsule 1    ciprofloxacin (CIPRO) 500 MG tablet Take 1 tablet by mouth 2 times daily for 3 days 6 tablet 0    NOVOLOG FLEXPEN 100 UNIT/ML injection pen Inject into the skin 3 times daily (before meals) SLIDING SCALE: BLOOD SUGAR =NO INSULIN.   140-199=1 UNIT,  200-249=2 UNITS,  250-299=3 UNITS,  300-349= 4 UNITS,  350-399=5 UNITS,  >399= 6 UNITS      dilTIAZem (CARDIZEM) 60 MG tablet Take 1 tablet by mouth every 12 hours 120 tablet 3    Multiple Vitamins-Minerals (THERAPEUTIC MULTIVITAMIN-MINERALS) tablet Take 1 tablet by mouth daily 90 tablet 1    ferrous sulfate (IRON 325) 325 (65 Fe) MG tablet Take 1 tablet by mouth daily (with breakfast) 60 tablet 0    vitamin C (ASCORBIC ACID) 500 MG tablet Take 500 mg by mouth daily      cyclobenzaprine (FLEXERIL) 10 MG tablet Take 1 tablet by mouth 3 times daily as needed for Muscle spasms (Patient taking differently: Take 10 mg by mouth 2 times daily as needed for Muscle spasms ) 30 tablet 0    aspirin 81 MG tablet Take 81 mg by mouth nightly STOPPED TAKING 10/28/2020      pravastatin (PRAVACHOL) 20 MG tablet Take 20 mg by mouth daily      metoprolol (LOPRESSOR) 50 MG tablet Take 50 mg by mouth 2 times daily      allopurinol (ZYLOPRIM) 100 MG tablet Take 100 mg by mouth daily      zolpidem (AMBIEN) 10 MG tablet Take by mouth nightly as needed for Sleep      hydrALAZINE (APRESOLINE) 25 MG tablet Take 25 mg by mouth 2 times daily       CPAP Machine MISC use as directed      B-D UF III MINI PEN NEEDLES 31G X 5 MM MISC       Respiratory Therapy Supplies (CARETOUCH CPAP & BIPAP HOSE) MISC Mask and Tubing      Melatonin 10 MG TABS Take 1 tablet by mouth nightly as needed      pantoprazole (PROTONIX) 40 MG tablet Take 1 tablet by mouth daily (Patient not taking: Reported on 11/2/2020) 60 tablet 0    blood glucose monitor strips Test 4 times a day & as needed for symptoms of irregular blood glucose. Dispense sufficient amount for indicated testing frequency plus additional to accommodate PRN testing needs. 120 strip 0    acetaminophen (TYLENOL) 500 MG tablet Take 2 tablets by mouth every 6 hours as needed for Pain 40 tablet 0     No current facility-administered medications for this visit. Allergies   Allergen Reactions    Ampicillin Swelling     Swelling of throat.  Pcn [Penicillins] Swelling     Throat swelling. Tolerated cefepime during 8/31/20 admission.  Sulfa Antibiotics Other (See Comments)     Other reaction(s): Unknown    Tape [Adhesive Tape] Other (See Comments)     CAUSES REDNESS. PAPER TAPE OK.        Health Maintenance   Topic Date Due    Hepatitis C screen  1960    Diabetic foot exam  01/13/1968    Diabetic retinal exam  01/13/1968    HIV screen  01/13/1973    DTaP/Tdap/Td vaccine (1 - Tdap) 01/13/1977    Shingles Vaccine (1 of 2) 01/13/2008    Colon cancer screen colonoscopy  01/13/2008    Flu vaccine (1) 09/01/2020    Diabetic microalbuminuria test  06/22/2021    Lipid screen  06/22/2021    A1C test (Diabetic or Prediabetic)  09/08/2021    Potassium monitoring  10/01/2021    Creatinine monitoring  10/30/2021    Pneumococcal 0-64 years Vaccine  Completed    Hepatitis A vaccine  Aged Out    Hib vaccine  Aged Out    Meningococcal (ACWY) vaccine  Aged Out       Subjective:      Review of Systems   Constitutional: Positive for activity change and fatigue. Negative for appetite change, chills, diaphoresis and fever. Eyes: Negative for visual disturbance. Respiratory: Positive for apnea. Negative for cough, chest tightness and shortness of breath. Cardiovascular: Negative for chest pain, palpitations and leg swelling. Gastrointestinal: Negative for abdominal pain, blood in stool, constipation, diarrhea, nausea and vomiting. Endocrine: Negative for polydipsia, polyphagia and polyuria. Genitourinary: Negative for decreased urine volume, hematuria and testicular pain. Musculoskeletal: Positive for back pain. Negative for gait problem. Skin: Negative for rash. Allergic/Immunologic: Negative for immunocompromised state. Neurological: Positive for weakness. Negative for dizziness, light-headedness, numbness and headaches. Hematological: Negative for adenopathy. Psychiatric/Behavioral: Positive for sleep disturbance. Negative for dysphoric mood. The patient is nervous/anxious. Objective:      Physical Exam  Vitals signs and nursing note reviewed. Exam conducted with a chaperone present. Constitutional:       General: He is not in acute distress. Appearance: Normal appearance. He is well-developed. He is obese. He is not ill-appearing or diaphoretic.       Comments: /62 (Site: Right Upper Arm, Position: Sitting, Cuff Size: Medium Adult)   Pulse 121   Temp 97.9 °F (36.6 °C) (Tympanic)   Ht 5' 11\" (1.803 m)   Wt 215 lb 6.4 oz (97.7 Obstructive sleep apnea syndrome     5. Chronic midline low back pain without sciatica  traMADol (ULTRAM) 50 MG tablet   6. Physical deconditioning  Amb External Referral To Physical Therapy   7. Encounter for hepatitis C screening test for low risk patient  Hepatitis C Antibody   8. Screening for HIV (human immunodeficiency virus)  HIV Screen   9. Essential hypertension  Comprehensive Metabolic Panel    CBC Auto Differential   10. DEBBI (acute kidney injury) ([facility] [address]  Comprehensive Metabolic Panel   11.  Insomnia, unspecified type       Wt Readings from Last 3 Encounters:   11/02/20 215 lb 6.4 oz (97.7 kg)   10/30/20 215 lb 9.8 oz (97.8 kg)   10/02/20 236 lb 1.8 oz (107.1 kg)     BP Readings from Last 3 Encounters:   11/02/20 112/62   10/30/20 126/81   10/30/20 (!) 131/93     Lab Results   Component Value Date    WBC 8.8 10/01/2020    HGB 9.4 (L) 10/01/2020    HCT 30.9 (L) 10/01/2020    MCV 87.3 10/01/2020     10/01/2020       Chemistry        Component Value Date/Time     10/01/2020 0558    K 3.4 (L) 10/01/2020 0558     10/01/2020 0558    CO2 22 10/01/2020 0558    BUN 9 10/01/2020 0558    CREATININE 1.67 (H) 10/30/2020 1204    CREATININE 1.11 10/01/2020 0558        Component Value Date/Time    CALCIUM 8.5 (L) 10/01/2020 0558    ALKPHOS 112 09/22/2020 1720    AST 32 09/22/2020 1720    ALT 21 09/22/2020 1720    BILITOT 0.47 09/22/2020 1720        Lab Results   Component Value Date    CHOL 131 06/22/2020     Lab Results   Component Value Date    TRIG 115 09/05/2020    TRIG 186 (H) 06/22/2020     Lab Results   Component Value Date    HDL 31 (L) 06/22/2020     Lab Results   Component Value Date    LDLCHOLESTEROL 63 06/22/2020     Lab Results   Component Value Date    VLDL NOT REPORTED (H) 06/22/2020     Lab Results   Component Value Date    CHOLHDLRATIO 4.2 06/22/2020     Results for orders placed or performed in visit on 11/02/20   POCT glycosylated hemoglobin (Hb A1C)   Result Value Ref Range Hemoglobin A1C 6.4 %     Narrative    EXAMINATION:    CT OF THE ABDOMEN AND PELVIS WITHOUT CONTRAST 9/28/2020 2:30 pm         TECHNIQUE:    CT of the abdomen and pelvis was performed without the administration of    intravenous contrast. Multiplanar reformatted images are provided for review. Dose modulation, iterative reconstruction, and/or weight based adjustment of    the mA/kV was utilized to reduce the radiation dose to as low as reasonably    achievable.         COMPARISON:    09/23/2020         HISTORY:    ORDERING SYSTEM PROVIDED HISTORY: POD#5 s/p diverting colostomy. Cont abd    distention and pain. Examine for abscess vs leak. H/o CKD    TECHNOLOGIST PROVIDED HISTORY:    POD#5 s/p diverting colostomy. Cont abd distention and pain. Examine for    abscess vs leak. H/o CKD         Reason for Exam: POD#5 s/p diverting colostomy. Cont abd distention and pain. Examine for abscess vs leak. H/o CKD    Acuity: Unknown    Type of Exam: Unknown         FINDINGS:    Lower Chest: Moderate bilateral pleural effusion left greater than right with    adjacent compressive subsegmental atelectasis also more prominent on the left    side.  The right effusion is new on the left as increased.         Organs: Limited evaluation the absence of IV contrast.  The liver, spleen,    pancreas, adrenal glands and gallbladder show no significant abnormalities. Left nephrectomy noted.  Ureteric stent in place with the proximal end in the    right upper pole calyx and the distal end in the urinary bladder         GI/Bowel: Postsurgical changes in the GI tract.  There is some bowel    anastomosis in the mid abdomen.  Contrast is in the loops around the    anastomosis.  No extravasation of contrast.  Right mid abdomen colostomy    noted.  2nd anastomotic suture line noted in the mid descending colon.     Contrast has not reached this area at time of scanning.  No abnormal    extraluminal gas pockets localizing around this area.  No findings to suggest    bowel obstruction.         Pelvis: Urinary bladder grossly normal.  No suspicious pelvic mass.         Peritoneum/Retroperitoneum: Peritoneal infiltration and free fluid as well as    a few gas pockets consistent with recent surgery.  Findings are resolving.  A    3.0 x 6.8 x 6.7 cm collection along the left mid abdomen anterior abdominal    wall is partly loculated.  Attention on follow-up.         Bones/Soft Tissues: Laparotomy noted.  Peg tube in place.  Right mid abdomen    approach postsurgical drainage catheter terminates in the right pelvis. Diffuse degenerative changes.              Impression    1. No CT evidence for abscess or anastomotic leakage. 2. No bowel obstruction. 3. The fluid collection along the anterior left mid abdomen is becoming more    localized measuring about 3 x 6.8 x 6.7 cm.  This could potentially    developing to a loculated collection.  Attention on follow-up.     4. Free intraperitoneal fluid and air is resolving however right pleural    effusion is new and left pleural effusion has increased.              Narrative & Impression      [facility]     Transthoracic Echocardiography Report (TTE)      Patient Name Sharon Fu     Date of Study               09/01/2020                Jesus Alberto Crespo II      Date of      1960  Gender                      Male   Birth      Age          56 year(s)  Race                              Room Number  1110        Height:                     71 inch, 180.34 cm      Corporate ID [id number]    Weight:                     255 pounds, 115.7 kg   #      Patient Acct [de-identified]   BSA:          2.34 m^2      BMI:      35.57   #                                                              kg/m^2      MR #         [id number]     Sonographer                 Sandrita Hernández      Accession #  [id number]  Interpreting Physician      Ji Beverly      Fellow                   Referring Nurse Practitioner      Interpreting             Referring Physician         [address]   Fellow     Type of Study      TTE procedure:2D Echocardiogram, M-Mode, Doppler, Color Doppler. Procedure Date  Date: 09/01/2020 Start: 09:44 AM     Study Location: [address] [facility]  Technical Quality: Poor visualization due to lung interference.     Indications:Tachycardia and Preop cardiac evaluation.     History / Tech. Comments:  technically difficult study.     Patient Status: Inpatient     Height: 71 inches Weight: 255.01 pounds BSA: 2.34 m^2 BMI: 35.57 kg/m^2     Rhythm: Sinus tachycardia HR: 107 bpm     CONCLUSIONS     Summary  Left ventricle is normal in size. Mild left ventricular hypertrophy. Global left ventricular systolic function is normal with an estimated  ejection fraction of 60 % . Due to the technical limitations of this study not all wall segments were  visualized. No significant valvular regurgitation or stenosis seen. Large pleural effusion. Plan:      Return in about 3 months (around 2/2/2021) for return for diabetes management.   Orders Placed This Encounter   Procedures    HIV Screen     Standing Status:   Future     Standing Expiration Date:   11/2/2021    Comprehensive Metabolic Panel     Standing Status:   Future     Standing Expiration Date:   11/2/2021    CBC Auto Differential     Standing Status:   Future     Standing Expiration Date:   11/2/2021    Hepatitis C Antibody     Standing Status:   Future     Standing Expiration Date:   11/3/2021    Amb External Referral To Physical Therapy     Referral Priority:   Routine     Referral Type:   Consult for Advice and Opinion     Referral Reason:   Specialty Services Required     Requested Specialty:   Physical Therapy     Number of Visits Requested:   1    POCT glycosylated hemoglobin (Hb A1C)     Orders Placed This Encounter   Medications    DULoxetine (CYMBALTA) 20 MG extended release capsule     Sig: Take 1 capsule by mouth daily     Dispense:  30 capsule     Refill:  0    traMADol (ULTRAM) 50 MG tablet     Sig: Take 2 tablets by mouth every evening for 30 days. Dispense:  60 tablet     Refill:  0     Reduce doses taken as pain becomes manageable, dr Bibiana Millard [id number]     DM:  Controlled  Pt to continue diabetic diet and diabetic protein shakes  Continue meal time insulin for now and checking BG with all meals  Will hopefully wean off insulin with next check and transition to oral pill    Back pain:  Advised of tramadol being CS  Will try to wean down on this and trial Cymbalta for now and increase dose as tolerated. Continue tramadol for now and avoid with norco ( he is almost done with this)  Controlled Substance Monitoring:    Acute and Chronic Pain Monitoring:   RX Monitoring 10/2/2020   Acute Pain Prescriptions Severe pain not adequately treated with lower dose. Periodic Controlled Substance Monitoring Obtaining appropriate analgesic effect of treatment. Jean:  Stable  Continue cpap nightly    Weakness:  Start PT for strength ing and continue protein shakes    GI:  Continue with surgeon for removal of all drains/colostomy and stents  Continue other meds and high protein diet    He has appt with cardio tomorrow  HTN controlled today and also lipidemia     Sleep\"  Ongoing issue  Continue ambien for now and will consider other options once recovered from current situation and other opiates  Avoid tobacco, drugs and alcohol    *45 mins spent with pt discussing multiple health problems and counseling on all treatment  options    Reviewed all testing and hospital records    Patient given educational materials - see patient instructions. Discussed use,benefit, and side effects of prescribed medications. All patient questions answered. Pt voiced understanding. Reviewed health maintenance. Instructed to continue currentmedications, diet and exercise.     Electronically signed by Cynthia Slaughter CNP on 11/2/2020 at 7:08 PM

## 2020-11-02 NOTE — PROGRESS NOTES
Visit Information    Have you changed or started any medications since your last visit including any over-the-counter medicines, vitamins, or herbal medicines? no   Have you stopped taking any of your medications? Is so, why? -  no  Are you having any side effects from any of your medications? - no    Have you seen any other physician or provider since your last visit?  no   Have you had any other diagnostic tests since your last visit?  no   Have you been seen in the emergency room and/or had an admission in a hospital since we last saw you?  no   Have you had your routine dental cleaning in the past 6 months?  no     Do you have an active MyChart account? If no, what is the barrier?   Yes    Patient Care Team:  Melanie Ellison MD as PCP - General    Medical History Review  Past Medical, Family, and Social History reviewed and  contribute to the patient presenting condition    Health Maintenance   Topic Date Due    Hepatitis C screen  1960    Diabetic foot exam  01/13/1968    Diabetic retinal exam  01/13/1968    HIV screen  01/13/1973    DTaP/Tdap/Td vaccine (1 - Tdap) 01/13/1977    Shingles Vaccine (1 of 2) 01/13/2008    Colon cancer screen colonoscopy  01/13/2008    Flu vaccine (1) 09/01/2020    A1C test (Diabetic or Prediabetic)  12/08/2020    Diabetic microalbuminuria test  06/22/2021    Lipid screen  06/22/2021    Potassium monitoring  10/01/2021    Creatinine monitoring  10/30/2021    Pneumococcal 0-64 years Vaccine  Completed    Hepatitis A vaccine  Aged Out    Hib vaccine  Aged Out    Meningococcal (ACWY) vaccine  Aged Out

## 2020-11-10 ENCOUNTER — HOSPITAL ENCOUNTER (OUTPATIENT)
Age: 60
Setting detail: SPECIMEN
Discharge: HOME | End: 2020-11-10
Payer: COMMERCIAL

## 2020-11-10 LAB
ABSOLUTE EOS #: 0.27 K/UL (ref 0–0.44)
ABSOLUTE IMMATURE GRANULOCYTE: 0.05 K/UL (ref 0–0.3)
ABSOLUTE LYMPH #: 2.85 K/UL (ref 1.1–3.7)
ABSOLUTE MONO #: 0.68 K/UL (ref 0.1–1.2)
ALBUMIN SERPL-MCNC: 4.1 G/DL (ref 3.5–5.2)
ALBUMIN/GLOBULIN RATIO: 1.1 (ref 1–2.5)
ALP BLD-CCNC: 94 U/L (ref 40–129)
ALT SERPL-CCNC: 38 U/L (ref 5–41)
ANION GAP SERPL CALCULATED.3IONS-SCNC: 14 MMOL/L (ref 9–17)
AST SERPL-CCNC: 25 U/L
BASOPHILS # BLD: 1 % (ref 0–2)
BASOPHILS ABSOLUTE: 0.07 K/UL (ref 0–0.2)
BILIRUB SERPL-MCNC: 0.33 MG/DL (ref 0.3–1.2)
BUN BLDV-MCNC: 40 MG/DL (ref 8–23)
BUN/CREAT BLD: ABNORMAL (ref 9–20)
CALCIUM SERPL-MCNC: 10.4 MG/DL (ref 8.6–10.4)
CHLORIDE BLD-SCNC: 104 MMOL/L (ref 98–107)
CO2: 19 MMOL/L (ref 20–31)
CREAT SERPL-MCNC: 1.45 MG/DL (ref 0.7–1.2)
DIFFERENTIAL TYPE: ABNORMAL
EOSINOPHILS RELATIVE PERCENT: 3 % (ref 1–4)
GFR AFRICAN AMERICAN: 60 ML/MIN
GFR NON-AFRICAN AMERICAN: 49 ML/MIN
GFR SERPL CREATININE-BSD FRML MDRD: ABNORMAL ML/MIN/{1.73_M2}
GFR SERPL CREATININE-BSD FRML MDRD: ABNORMAL ML/MIN/{1.73_M2}
GLUCOSE BLD-MCNC: 118 MG/DL (ref 70–99)
HCT VFR BLD CALC: 43.1 % (ref 40.7–50.3)
HEMOGLOBIN: 13.3 G/DL (ref 13–17)
HEPATITIS C ANTIBODY: NONREACTIVE
HIV AG/AB: NONREACTIVE
IMMATURE GRANULOCYTES: 1 %
LYMPHOCYTES # BLD: 34 % (ref 24–43)
MCH RBC QN AUTO: 27 PG (ref 25.2–33.5)
MCHC RBC AUTO-ENTMCNC: 30.9 G/DL (ref 28.4–34.8)
MCV RBC AUTO: 87.6 FL (ref 82.6–102.9)
MONOCYTES # BLD: 8 % (ref 3–12)
NRBC AUTOMATED: 0 PER 100 WBC
PDW BLD-RTO: 15.5 % (ref 11.8–14.4)
PLATELET # BLD: 282 K/UL (ref 138–453)
PLATELET ESTIMATE: ABNORMAL
PMV BLD AUTO: 11.4 FL (ref 8.1–13.5)
POTASSIUM SERPL-SCNC: 4.6 MMOL/L (ref 3.7–5.3)
RBC # BLD: 4.92 M/UL (ref 4.21–5.77)
RBC # BLD: ABNORMAL 10*6/UL
SEG NEUTROPHILS: 53 % (ref 36–65)
SEGMENTED NEUTROPHILS ABSOLUTE COUNT: 4.45 K/UL (ref 1.5–8.1)
SODIUM BLD-SCNC: 137 MMOL/L (ref 135–144)
TOTAL PROTEIN: 7.9 G/DL (ref 6.4–8.3)
WBC # BLD: 8.4 K/UL (ref 3.5–11.3)
WBC # BLD: ABNORMAL 10*3/UL

## 2020-11-10 PROCEDURE — 87389 HIV-1 AG W/HIV-1&-2 AB AG IA: CPT

## 2020-11-10 PROCEDURE — 80053 COMPREHEN METABOLIC PANEL: CPT

## 2020-11-10 PROCEDURE — 85025 COMPLETE CBC W/AUTO DIFF WBC: CPT

## 2020-11-10 PROCEDURE — 86803 HEPATITIS C AB TEST: CPT

## 2020-11-16 ENCOUNTER — PATIENT MESSAGE (OUTPATIENT)
Dept: FAMILY MEDICINE CLINIC | Age: 60
End: 2020-11-16

## 2020-11-16 NOTE — TELEPHONE ENCOUNTER
From: Franklin Barroso II  To: TORREY Izaguirre CNP  Sent: 11/16/2020 4:12 PM EST  Subject: Non-Urgent Medical Question    What kidney function were you concerned about

## 2020-12-02 ENCOUNTER — TELEPHONE (OUTPATIENT)
Dept: FAMILY MEDICINE CLINIC | Age: 60
End: 2020-12-02

## 2020-12-02 ENCOUNTER — PATIENT MESSAGE (OUTPATIENT)
Dept: FAMILY MEDICINE CLINIC | Age: 60
End: 2020-12-02

## 2020-12-02 NOTE — TELEPHONE ENCOUNTER
From: Heena Pena II  To: Dudley Delacruz, APRN - CNP  Sent: 12/2/2020 2:26 PM EST  Subject: Prescription Question    Tereso Cuenca supposed to be on eliquis 5 mg twice a day I have 1 refill left but [facility] says I don't did [facility] get you to fill

## 2020-12-02 NOTE — TELEPHONE ENCOUNTER
Patient wanted to let you know that his cardiologist is going to be filling his eliquis from now on so you wont have to do it.

## 2020-12-08 ENCOUNTER — HOSPITAL ENCOUNTER (OUTPATIENT)
Dept: GENERAL RADIOLOGY | Age: 60
Discharge: HOME | End: 2020-12-10
Payer: COMMERCIAL

## 2020-12-08 PROCEDURE — 6360000004 HC RX CONTRAST MEDICATION: Performed by: SURGERY

## 2020-12-08 PROCEDURE — 6360000004 HC RX CONTRAST MEDICATION

## 2020-12-08 PROCEDURE — 74270 X-RAY XM COLON 1CNTRST STD: CPT

## 2020-12-08 RX ADMIN — DIATRIZOATE MEGLUMINE AND DIATRIZOATE SODIUM 480 ML: 660; 100 LIQUID ORAL; RECTAL at 10:06

## 2020-12-11 ENCOUNTER — PATIENT MESSAGE (OUTPATIENT)
Dept: FAMILY MEDICINE CLINIC | Age: 60
End: 2020-12-11

## 2020-12-11 RX ORDER — TRAMADOL HYDROCHLORIDE 50 MG/1
100 TABLET ORAL EVERY EVENING
Qty: 60 TABLET | Refills: 0 | Status: ON HOLD | OUTPATIENT
Start: 2020-12-11 | End: 2020-12-21 | Stop reason: HOSPADM

## 2020-12-11 NOTE — TELEPHONE ENCOUNTER
From: Franklin Barroso II  To: TORREY Izaguirre CNP  Sent: 12/11/2020 10:44 AM EST  Subject: Prescription Question    Chanducole Laird took last tramadol last night need a refill 50 mg two in evening.  [facility] phone # [phone] thanks ran

## 2020-12-14 ENCOUNTER — HOSPITAL ENCOUNTER (OUTPATIENT)
Dept: LAB | Age: 60
Setting detail: SPECIMEN
Discharge: HOME | End: 2020-12-14
Payer: COMMERCIAL

## 2020-12-14 PROCEDURE — U0003 INFECTIOUS AGENT DETECTION BY NUCLEIC ACID (DNA OR RNA); SEVERE ACUTE RESPIRATORY SYNDROME CORONAVIRUS 2 (SARS-COV-2) (CORONAVIRUS DISEASE [COVID-19]), AMPLIFIED PROBE TECHNIQUE, MAKING USE OF HIGH THROUGHPUT TECHNOLOGIES AS DESCRIBED BY CMS-2020-01-R: HCPCS

## 2020-12-15 LAB
SARS-COV-2, RAPID: NORMAL
SARS-COV-2: NORMAL
SARS-COV-2: NOT DETECTED
SOURCE: NORMAL

## 2020-12-16 ENCOUNTER — TELEPHONE (OUTPATIENT)
Dept: FAMILY MEDICINE CLINIC | Age: 60
End: 2020-12-16

## 2020-12-16 NOTE — TELEPHONE ENCOUNTER
Called and left message stating Covid test results from 12/14/2020 were negative and to call back with any questions.

## 2020-12-18 ENCOUNTER — ANESTHESIA (OUTPATIENT)
Dept: OPERATING ROOM | Age: 60
DRG: 331 | End: 2020-12-18
Payer: COMMERCIAL

## 2020-12-18 ENCOUNTER — PATIENT MESSAGE (OUTPATIENT)
Dept: FAMILY MEDICINE CLINIC | Age: 60
End: 2020-12-18

## 2020-12-18 ENCOUNTER — HOSPITAL ENCOUNTER (INPATIENT)
Age: 60
LOS: 3 days | Discharge: HOME HEALTH | DRG: 331 | End: 2020-12-21
Attending: SURGERY | Admitting: SURGERY
Payer: COMMERCIAL

## 2020-12-18 ENCOUNTER — ANESTHESIA EVENT (OUTPATIENT)
Dept: OPERATING ROOM | Age: 60
DRG: 331 | End: 2020-12-18
Payer: COMMERCIAL

## 2020-12-18 VITALS — TEMPERATURE: 97 F | SYSTOLIC BLOOD PRESSURE: 101 MMHG | OXYGEN SATURATION: 99 % | DIASTOLIC BLOOD PRESSURE: 86 MMHG

## 2020-12-18 PROBLEM — Z93.3 COLOSTOMY IN PLACE (HCC): Status: ACTIVE | Noted: 2020-12-18

## 2020-12-18 LAB
ESTIMATED AVERAGE GLUCOSE: 126 MG/DL
GFR NON-AFRICAN AMERICAN: 47 ML/MIN
GFR SERPL CREATININE-BSD FRML MDRD: 58 ML/MIN
GFR SERPL CREATININE-BSD FRML MDRD: ABNORMAL ML/MIN/{1.73_M2}
GLUCOSE BLD-MCNC: 117 MG/DL (ref 74–100)
GLUCOSE BLD-MCNC: 124 MG/DL (ref 75–110)
GLUCOSE BLD-MCNC: 132 MG/DL (ref 75–110)
GLUCOSE BLD-MCNC: 136 MG/DL (ref 75–110)
GLUCOSE BLD-MCNC: 147 MG/DL (ref 75–110)
GLUCOSE BLD-MCNC: 163 MG/DL (ref 75–110)
HBA1C MFR BLD: 6 % (ref 4–6)
POC CREATININE: 1.5 MG/DL (ref 0.51–1.19)

## 2020-12-18 PROCEDURE — 7100000001 HC PACU RECOVERY - ADDTL 15 MIN: Performed by: SURGERY

## 2020-12-18 PROCEDURE — 6360000002 HC RX W HCPCS: Performed by: NURSE ANESTHETIST, CERTIFIED REGISTERED

## 2020-12-18 PROCEDURE — 2580000003 HC RX 258: Performed by: NURSE ANESTHETIST, CERTIFIED REGISTERED

## 2020-12-18 PROCEDURE — 2500000003 HC RX 250 WO HCPCS: Performed by: STUDENT IN AN ORGANIZED HEALTH CARE EDUCATION/TRAINING PROGRAM

## 2020-12-18 PROCEDURE — 51798 US URINE CAPACITY MEASURE: CPT

## 2020-12-18 PROCEDURE — 2580000003 HC RX 258: Performed by: STUDENT IN AN ORGANIZED HEALTH CARE EDUCATION/TRAINING PROGRAM

## 2020-12-18 PROCEDURE — 6370000000 HC RX 637 (ALT 250 FOR IP): Performed by: SURGERY

## 2020-12-18 PROCEDURE — 2500000003 HC RX 250 WO HCPCS: Performed by: NURSE ANESTHETIST, CERTIFIED REGISTERED

## 2020-12-18 PROCEDURE — 3700000001 HC ADD 15 MINUTES (ANESTHESIA): Performed by: SURGERY

## 2020-12-18 PROCEDURE — 2580000003 HC RX 258: Performed by: SURGERY

## 2020-12-18 PROCEDURE — 82947 ASSAY GLUCOSE BLOOD QUANT: CPT

## 2020-12-18 PROCEDURE — 3700000000 HC ANESTHESIA ATTENDED CARE: Performed by: SURGERY

## 2020-12-18 PROCEDURE — 2720000010 HC SURG SUPPLY STERILE: Performed by: SURGERY

## 2020-12-18 PROCEDURE — 6360000002 HC RX W HCPCS: Performed by: ANESTHESIOLOGY

## 2020-12-18 PROCEDURE — 7100000000 HC PACU RECOVERY - FIRST 15 MIN: Performed by: SURGERY

## 2020-12-18 PROCEDURE — 36415 COLL VENOUS BLD VENIPUNCTURE: CPT

## 2020-12-18 PROCEDURE — 6360000002 HC RX W HCPCS: Performed by: STUDENT IN AN ORGANIZED HEALTH CARE EDUCATION/TRAINING PROGRAM

## 2020-12-18 PROCEDURE — 6370000000 HC RX 637 (ALT 250 FOR IP): Performed by: STUDENT IN AN ORGANIZED HEALTH CARE EDUCATION/TRAINING PROGRAM

## 2020-12-18 PROCEDURE — 88304 TISSUE EXAM BY PATHOLOGIST: CPT

## 2020-12-18 PROCEDURE — 2500000003 HC RX 250 WO HCPCS: Performed by: SURGERY

## 2020-12-18 PROCEDURE — 6360000002 HC RX W HCPCS

## 2020-12-18 PROCEDURE — 3600000004 HC SURGERY LEVEL 4 BASE: Performed by: SURGERY

## 2020-12-18 PROCEDURE — 83036 HEMOGLOBIN GLYCOSYLATED A1C: CPT

## 2020-12-18 PROCEDURE — 64488 TAP BLOCK BI INJECTION: CPT | Performed by: ANESTHESIOLOGY

## 2020-12-18 PROCEDURE — 6360000002 HC RX W HCPCS: Performed by: SURGERY

## 2020-12-18 PROCEDURE — 2709999900 HC NON-CHARGEABLE SUPPLY: Performed by: SURGERY

## 2020-12-18 PROCEDURE — 82565 ASSAY OF CREATININE: CPT

## 2020-12-18 PROCEDURE — 1200000000 HC SEMI PRIVATE

## 2020-12-18 PROCEDURE — 0DBL0ZZ EXCISION OF TRANSVERSE COLON, OPEN APPROACH: ICD-10-PCS | Performed by: SURGERY

## 2020-12-18 PROCEDURE — 3600000014 HC SURGERY LEVEL 4 ADDTL 15MIN: Performed by: SURGERY

## 2020-12-18 RX ORDER — PHENYLEPHRINE HYDROCHLORIDE 10 MG/ML
INJECTION INTRAVENOUS PRN
Status: DISCONTINUED | OUTPATIENT
Start: 2020-12-18 | End: 2020-12-18 | Stop reason: SDUPTHER

## 2020-12-18 RX ORDER — DEXTROSE MONOHYDRATE 25 G/50ML
12.5 INJECTION, SOLUTION INTRAVENOUS PRN
Status: DISCONTINUED | OUTPATIENT
Start: 2020-12-18 | End: 2020-12-21 | Stop reason: HOSPADM

## 2020-12-18 RX ORDER — NEOSTIGMINE METHYLSULFATE 5 MG/5 ML
SYRINGE (ML) INTRAVENOUS PRN
Status: DISCONTINUED | OUTPATIENT
Start: 2020-12-18 | End: 2020-12-18 | Stop reason: SDUPTHER

## 2020-12-18 RX ORDER — SODIUM CHLORIDE 0.9 % (FLUSH) 0.9 %
10 SYRINGE (ML) INJECTION PRN
Status: DISCONTINUED | OUTPATIENT
Start: 2020-12-18 | End: 2020-12-21 | Stop reason: HOSPADM

## 2020-12-18 RX ORDER — MIDAZOLAM HYDROCHLORIDE 2 MG/2ML
2 INJECTION, SOLUTION INTRAMUSCULAR; INTRAVENOUS ONCE
Status: DISCONTINUED | OUTPATIENT
Start: 2020-12-18 | End: 2020-12-18

## 2020-12-18 RX ORDER — LEVOFLOXACIN 5 MG/ML
500 INJECTION, SOLUTION INTRAVENOUS EVERY 24 HOURS
Status: COMPLETED | OUTPATIENT
Start: 2020-12-19 | End: 2020-12-19

## 2020-12-18 RX ORDER — ONDANSETRON 2 MG/ML
4 INJECTION INTRAMUSCULAR; INTRAVENOUS EVERY 6 HOURS PRN
Status: DISCONTINUED | OUTPATIENT
Start: 2020-12-18 | End: 2020-12-21 | Stop reason: HOSPADM

## 2020-12-18 RX ORDER — PROMETHAZINE HYDROCHLORIDE 25 MG/ML
6.25 INJECTION, SOLUTION INTRAMUSCULAR; INTRAVENOUS ONCE
Status: COMPLETED | OUTPATIENT
Start: 2020-12-18 | End: 2020-12-18

## 2020-12-18 RX ORDER — GLYCOPYRROLATE 1 MG/5 ML
SYRINGE (ML) INTRAVENOUS PRN
Status: DISCONTINUED | OUTPATIENT
Start: 2020-12-18 | End: 2020-12-18 | Stop reason: SDUPTHER

## 2020-12-18 RX ORDER — ONDANSETRON 2 MG/ML
INJECTION INTRAMUSCULAR; INTRAVENOUS
Status: COMPLETED
Start: 2020-12-18 | End: 2020-12-18

## 2020-12-18 RX ORDER — LANOLIN ALCOHOL/MO/W.PET/CERES
6 CREAM (GRAM) TOPICAL NIGHTLY PRN
Status: DISCONTINUED | OUTPATIENT
Start: 2020-12-18 | End: 2020-12-21 | Stop reason: HOSPADM

## 2020-12-18 RX ORDER — ONDANSETRON 2 MG/ML
4 INJECTION INTRAMUSCULAR; INTRAVENOUS ONCE
Status: COMPLETED | OUTPATIENT
Start: 2020-12-18 | End: 2020-12-18

## 2020-12-18 RX ORDER — LIDOCAINE HYDROCHLORIDE 10 MG/ML
INJECTION, SOLUTION EPIDURAL; INFILTRATION; INTRACAUDAL; PERINEURAL PRN
Status: DISCONTINUED | OUTPATIENT
Start: 2020-12-18 | End: 2020-12-18 | Stop reason: SDUPTHER

## 2020-12-18 RX ORDER — FENTANYL CITRATE 50 UG/ML
25 INJECTION, SOLUTION INTRAMUSCULAR; INTRAVENOUS EVERY 5 MIN PRN
Status: DISCONTINUED | OUTPATIENT
Start: 2020-12-18 | End: 2020-12-18 | Stop reason: HOSPADM

## 2020-12-18 RX ORDER — PRAVASTATIN SODIUM 20 MG
20 TABLET ORAL DAILY
Status: DISCONTINUED | OUTPATIENT
Start: 2020-12-18 | End: 2020-12-21 | Stop reason: HOSPADM

## 2020-12-18 RX ORDER — LEVOFLOXACIN 5 MG/ML
500 INJECTION, SOLUTION INTRAVENOUS ONCE
Status: COMPLETED | OUTPATIENT
Start: 2020-12-18 | End: 2020-12-18

## 2020-12-18 RX ORDER — SODIUM CHLORIDE 0.9 % (FLUSH) 0.9 %
10 SYRINGE (ML) INJECTION EVERY 12 HOURS SCHEDULED
Status: DISCONTINUED | OUTPATIENT
Start: 2020-12-18 | End: 2020-12-21 | Stop reason: HOSPADM

## 2020-12-18 RX ORDER — FENTANYL CITRATE 50 UG/ML
INJECTION, SOLUTION INTRAMUSCULAR; INTRAVENOUS PRN
Status: DISCONTINUED | OUTPATIENT
Start: 2020-12-18 | End: 2020-12-18 | Stop reason: SDUPTHER

## 2020-12-18 RX ORDER — DEXTROSE MONOHYDRATE 50 MG/ML
100 INJECTION, SOLUTION INTRAVENOUS PRN
Status: DISCONTINUED | OUTPATIENT
Start: 2020-12-18 | End: 2020-12-21 | Stop reason: HOSPADM

## 2020-12-18 RX ORDER — MAGNESIUM HYDROXIDE 1200 MG/15ML
LIQUID ORAL CONTINUOUS PRN
Status: COMPLETED | OUTPATIENT
Start: 2020-12-18 | End: 2020-12-18

## 2020-12-18 RX ORDER — ZOLPIDEM TARTRATE 5 MG/1
5 TABLET ORAL NIGHTLY PRN
Status: DISCONTINUED | OUTPATIENT
Start: 2020-12-18 | End: 2020-12-21 | Stop reason: HOSPADM

## 2020-12-18 RX ORDER — ACETAMINOPHEN 500 MG
1000 TABLET ORAL EVERY 8 HOURS SCHEDULED
Status: DISCONTINUED | OUTPATIENT
Start: 2020-12-18 | End: 2020-12-21 | Stop reason: HOSPADM

## 2020-12-18 RX ORDER — SODIUM CHLORIDE, SODIUM LACTATE, POTASSIUM CHLORIDE, CALCIUM CHLORIDE 600; 310; 30; 20 MG/100ML; MG/100ML; MG/100ML; MG/100ML
INJECTION, SOLUTION INTRAVENOUS CONTINUOUS PRN
Status: DISCONTINUED | OUTPATIENT
Start: 2020-12-18 | End: 2020-12-18 | Stop reason: SDUPTHER

## 2020-12-18 RX ORDER — MORPHINE SULFATE 4 MG/ML
4 INJECTION, SOLUTION INTRAMUSCULAR; INTRAVENOUS
Status: DISCONTINUED | OUTPATIENT
Start: 2020-12-18 | End: 2020-12-20

## 2020-12-18 RX ORDER — SODIUM CHLORIDE, SODIUM LACTATE, POTASSIUM CHLORIDE, CALCIUM CHLORIDE 600; 310; 30; 20 MG/100ML; MG/100ML; MG/100ML; MG/100ML
INJECTION, SOLUTION INTRAVENOUS CONTINUOUS
Status: DISCONTINUED | OUTPATIENT
Start: 2020-12-18 | End: 2020-12-19

## 2020-12-18 RX ORDER — NICOTINE POLACRILEX 4 MG
15 LOZENGE BUCCAL PRN
Status: DISCONTINUED | OUTPATIENT
Start: 2020-12-18 | End: 2020-12-21 | Stop reason: HOSPADM

## 2020-12-18 RX ORDER — ONDANSETRON 4 MG/1
4 TABLET, ORALLY DISINTEGRATING ORAL EVERY 8 HOURS PRN
Status: DISCONTINUED | OUTPATIENT
Start: 2020-12-18 | End: 2020-12-21 | Stop reason: HOSPADM

## 2020-12-18 RX ORDER — DULOXETIN HYDROCHLORIDE 20 MG/1
20 CAPSULE, DELAYED RELEASE ORAL DAILY
Status: DISCONTINUED | OUTPATIENT
Start: 2020-12-18 | End: 2020-12-21 | Stop reason: HOSPADM

## 2020-12-18 RX ORDER — PROPOFOL 10 MG/ML
INJECTION, EMULSION INTRAVENOUS PRN
Status: DISCONTINUED | OUTPATIENT
Start: 2020-12-18 | End: 2020-12-18 | Stop reason: SDUPTHER

## 2020-12-18 RX ORDER — MORPHINE SULFATE 2 MG/ML
2 INJECTION, SOLUTION INTRAMUSCULAR; INTRAVENOUS
Status: DISCONTINUED | OUTPATIENT
Start: 2020-12-18 | End: 2020-12-20

## 2020-12-18 RX ORDER — BUPIVACAINE HYDROCHLORIDE 5 MG/ML
40 INJECTION, SOLUTION EPIDURAL; INTRACAUDAL ONCE
Status: DISCONTINUED | OUTPATIENT
Start: 2020-12-18 | End: 2020-12-18 | Stop reason: HOSPADM

## 2020-12-18 RX ORDER — OXYCODONE HYDROCHLORIDE 5 MG/1
5 TABLET ORAL EVERY 4 HOURS PRN
Status: DISCONTINUED | OUTPATIENT
Start: 2020-12-18 | End: 2020-12-20

## 2020-12-18 RX ORDER — ALLOPURINOL 100 MG/1
100 TABLET ORAL DAILY
Status: DISCONTINUED | OUTPATIENT
Start: 2020-12-18 | End: 2020-12-21 | Stop reason: HOSPADM

## 2020-12-18 RX ORDER — HEPARIN SODIUM 5000 [USP'U]/ML
5000 INJECTION, SOLUTION INTRAVENOUS; SUBCUTANEOUS EVERY 8 HOURS SCHEDULED
Status: DISCONTINUED | OUTPATIENT
Start: 2020-12-18 | End: 2020-12-20

## 2020-12-18 RX ORDER — FENTANYL CITRATE 50 UG/ML
100 INJECTION, SOLUTION INTRAMUSCULAR; INTRAVENOUS ONCE
Status: DISCONTINUED | OUTPATIENT
Start: 2020-12-18 | End: 2020-12-18

## 2020-12-18 RX ORDER — FENTANYL CITRATE 50 UG/ML
50 INJECTION, SOLUTION INTRAMUSCULAR; INTRAVENOUS EVERY 5 MIN PRN
Status: DISCONTINUED | OUTPATIENT
Start: 2020-12-18 | End: 2020-12-18 | Stop reason: HOSPADM

## 2020-12-18 RX ORDER — ONDANSETRON 2 MG/ML
INJECTION INTRAMUSCULAR; INTRAVENOUS PRN
Status: DISCONTINUED | OUTPATIENT
Start: 2020-12-18 | End: 2020-12-18 | Stop reason: SDUPTHER

## 2020-12-18 RX ORDER — ROCURONIUM BROMIDE 10 MG/ML
INJECTION, SOLUTION INTRAVENOUS PRN
Status: DISCONTINUED | OUTPATIENT
Start: 2020-12-18 | End: 2020-12-18 | Stop reason: SDUPTHER

## 2020-12-18 RX ADMIN — LEVOFLOXACIN 500 MG: 5 INJECTION, SOLUTION INTRAVENOUS at 08:08

## 2020-12-18 RX ADMIN — FENTANYL CITRATE 100 MCG: 50 INJECTION, SOLUTION INTRAMUSCULAR; INTRAVENOUS at 07:48

## 2020-12-18 RX ADMIN — FAMOTIDINE 20 MG: 10 INJECTION INTRAVENOUS at 21:27

## 2020-12-18 RX ADMIN — MORPHINE SULFATE 4 MG: 4 INJECTION INTRAVENOUS at 14:39

## 2020-12-18 RX ADMIN — SODIUM CHLORIDE, POTASSIUM CHLORIDE, SODIUM LACTATE AND CALCIUM CHLORIDE: 600; 310; 30; 20 INJECTION, SOLUTION INTRAVENOUS at 09:42

## 2020-12-18 RX ADMIN — ROCURONIUM BROMIDE 10 MG: 10 INJECTION, SOLUTION INTRAVENOUS at 08:31

## 2020-12-18 RX ADMIN — SODIUM CHLORIDE, POTASSIUM CHLORIDE, SODIUM LACTATE AND CALCIUM CHLORIDE: 600; 310; 30; 20 INJECTION, SOLUTION INTRAVENOUS at 07:15

## 2020-12-18 RX ADMIN — LIDOCAINE HYDROCHLORIDE 50 MG: 10 INJECTION, SOLUTION EPIDURAL; INFILTRATION; INTRACAUDAL; PERINEURAL at 07:52

## 2020-12-18 RX ADMIN — HEPARIN SODIUM 5000 UNITS: 5000 INJECTION INTRAVENOUS; SUBCUTANEOUS at 13:20

## 2020-12-18 RX ADMIN — METRONIDAZOLE 500 MG: 500 INJECTION, SOLUTION INTRAVENOUS at 07:57

## 2020-12-18 RX ADMIN — NALOXEGOL OXALATE 25 MG: 12.5 TABLET, FILM COATED ORAL at 07:42

## 2020-12-18 RX ADMIN — PROPOFOL 200 MG: 10 INJECTION, EMULSION INTRAVENOUS at 07:52

## 2020-12-18 RX ADMIN — ACETAMINOPHEN 1000 MG: 500 TABLET ORAL at 21:26

## 2020-12-18 RX ADMIN — METRONIDAZOLE 500 MG: 500 INJECTION, SOLUTION INTRAVENOUS at 16:07

## 2020-12-18 RX ADMIN — ONDANSETRON 4 MG: 2 INJECTION INTRAMUSCULAR; INTRAVENOUS at 08:51

## 2020-12-18 RX ADMIN — Medication 2.5 MG: at 09:35

## 2020-12-18 RX ADMIN — MORPHINE SULFATE 4 MG: 4 INJECTION INTRAVENOUS at 21:27

## 2020-12-18 RX ADMIN — FENTANYL CITRATE 50 MCG: 50 INJECTION, SOLUTION INTRAMUSCULAR; INTRAVENOUS at 09:38

## 2020-12-18 RX ADMIN — MORPHINE SULFATE 4 MG: 4 INJECTION INTRAVENOUS at 18:40

## 2020-12-18 RX ADMIN — SODIUM CHLORIDE, PRESERVATIVE FREE 10 ML: 5 INJECTION INTRAVENOUS at 10:55

## 2020-12-18 RX ADMIN — PHENYLEPHRINE HYDROCHLORIDE 150 MCG: 10 INJECTION INTRAVENOUS at 08:00

## 2020-12-18 RX ADMIN — SODIUM CHLORIDE, POTASSIUM CHLORIDE, SODIUM LACTATE AND CALCIUM CHLORIDE: 600; 310; 30; 20 INJECTION, SOLUTION INTRAVENOUS at 10:54

## 2020-12-18 RX ADMIN — ROCURONIUM BROMIDE 50 MG: 10 INJECTION, SOLUTION INTRAVENOUS at 07:52

## 2020-12-18 RX ADMIN — SODIUM CHLORIDE, POTASSIUM CHLORIDE, SODIUM LACTATE AND CALCIUM CHLORIDE: 600; 310; 30; 20 INJECTION, SOLUTION INTRAVENOUS at 08:33

## 2020-12-18 RX ADMIN — FENTANYL CITRATE 50 MCG: 50 INJECTION, SOLUTION INTRAMUSCULAR; INTRAVENOUS at 09:41

## 2020-12-18 RX ADMIN — PROMETHAZINE HYDROCHLORIDE 6.25 MG: 25 INJECTION INTRAMUSCULAR; INTRAVENOUS at 10:25

## 2020-12-18 RX ADMIN — Medication 0.2 MG: at 09:30

## 2020-12-18 RX ADMIN — MORPHINE SULFATE 4 MG: 4 INJECTION INTRAVENOUS at 11:30

## 2020-12-18 RX ADMIN — INSULIN LISPRO 3 UNITS: 100 INJECTION, SOLUTION INTRAVENOUS; SUBCUTANEOUS at 11:30

## 2020-12-18 RX ADMIN — ACETAMINOPHEN 1000 MG: 500 TABLET ORAL at 13:20

## 2020-12-18 RX ADMIN — PHENYLEPHRINE HYDROCHLORIDE 200 MCG: 10 INJECTION INTRAVENOUS at 08:42

## 2020-12-18 RX ADMIN — ONDANSETRON 4 MG: 2 INJECTION INTRAMUSCULAR; INTRAVENOUS at 10:27

## 2020-12-18 RX ADMIN — PHENYLEPHRINE HYDROCHLORIDE 100 MCG: 10 INJECTION INTRAVENOUS at 08:33

## 2020-12-18 RX ADMIN — SODIUM CHLORIDE, PRESERVATIVE FREE 10 ML: 5 INJECTION INTRAVENOUS at 20:02

## 2020-12-18 RX ADMIN — OXYCODONE HYDROCHLORIDE 5 MG: 5 TABLET ORAL at 13:22

## 2020-12-18 ASSESSMENT — PULMONARY FUNCTION TESTS
PIF_VALUE: 10
PIF_VALUE: 11
PIF_VALUE: 9
PIF_VALUE: 12
PIF_VALUE: 9
PIF_VALUE: 9
PIF_VALUE: 11
PIF_VALUE: 9
PIF_VALUE: 11
PIF_VALUE: 10
PIF_VALUE: 9
PIF_VALUE: 24
PIF_VALUE: 6
PIF_VALUE: 9
PIF_VALUE: 1
PIF_VALUE: 9
PIF_VALUE: 11
PIF_VALUE: 8
PIF_VALUE: 9
PIF_VALUE: 9
PIF_VALUE: 10
PIF_VALUE: 12
PIF_VALUE: 9
PIF_VALUE: 11
PIF_VALUE: 12
PIF_VALUE: 10
PIF_VALUE: 11
PIF_VALUE: 11
PIF_VALUE: 10
PIF_VALUE: 11
PIF_VALUE: 11
PIF_VALUE: 9
PIF_VALUE: 10
PIF_VALUE: 1
PIF_VALUE: 11
PIF_VALUE: 9
PIF_VALUE: 9
PIF_VALUE: 10
PIF_VALUE: 10
PIF_VALUE: 11
PIF_VALUE: 11
PIF_VALUE: 9
PIF_VALUE: 10
PIF_VALUE: 10
PIF_VALUE: 0
PIF_VALUE: 10
PIF_VALUE: 9
PIF_VALUE: 12
PIF_VALUE: 11
PIF_VALUE: 11
PIF_VALUE: 10
PIF_VALUE: 2
PIF_VALUE: 9
PIF_VALUE: 13
PIF_VALUE: 13
PIF_VALUE: 3
PIF_VALUE: 1
PIF_VALUE: 11
PIF_VALUE: 10
PIF_VALUE: 11
PIF_VALUE: 11
PIF_VALUE: 9
PIF_VALUE: 9
PIF_VALUE: 10
PIF_VALUE: 11
PIF_VALUE: 0
PIF_VALUE: 9
PIF_VALUE: 10
PIF_VALUE: 11
PIF_VALUE: 12
PIF_VALUE: 10
PIF_VALUE: 11
PIF_VALUE: 12
PIF_VALUE: 11
PIF_VALUE: 11
PIF_VALUE: 9
PIF_VALUE: 9
PIF_VALUE: 2
PIF_VALUE: 11
PIF_VALUE: 9
PIF_VALUE: 11
PIF_VALUE: 11
PIF_VALUE: 10
PIF_VALUE: 11
PIF_VALUE: 9
PIF_VALUE: 24
PIF_VALUE: 3
PIF_VALUE: 9
PIF_VALUE: 11
PIF_VALUE: 9
PIF_VALUE: 27
PIF_VALUE: 8
PIF_VALUE: 11
PIF_VALUE: 0
PIF_VALUE: 11
PIF_VALUE: 1
PIF_VALUE: 9
PIF_VALUE: 4
PIF_VALUE: 11
PIF_VALUE: 9
PIF_VALUE: 11

## 2020-12-18 ASSESSMENT — PAIN SCALES - GENERAL
PAINLEVEL_OUTOF10: 7
PAINLEVEL_OUTOF10: 7
PAINLEVEL_OUTOF10: 8
PAINLEVEL_OUTOF10: 6
PAINLEVEL_OUTOF10: 5
PAINLEVEL_OUTOF10: 7
PAINLEVEL_OUTOF10: 8
PAINLEVEL_OUTOF10: 7

## 2020-12-18 ASSESSMENT — ENCOUNTER SYMPTOMS
EYE PAIN: 0
BLOOD IN STOOL: 0
ABDOMINAL PAIN: 0
ABDOMINAL DISTENTION: 0
FACIAL SWELLING: 0
CHOKING: 0
EYE DISCHARGE: 0
PHOTOPHOBIA: 0
APNEA: 0
SHORTNESS OF BREATH: 0

## 2020-12-18 ASSESSMENT — PAIN - FUNCTIONAL ASSESSMENT: PAIN_FUNCTIONAL_ASSESSMENT: 0-10

## 2020-12-18 ASSESSMENT — PAIN DESCRIPTION - PAIN TYPE: TYPE: SURGICAL PAIN

## 2020-12-18 ASSESSMENT — PAIN DESCRIPTION - LOCATION: LOCATION: ABDOMEN

## 2020-12-18 NOTE — H&P
General Surgery          PATIENT NAME: Verónica Parker II   YOB: 1960    ADMISSION DATE: 12/18/2020  5:53 AM     Admitting Physician: Dr Scott Son: 12/18/2020    Reason for admission: Loop colostomy right upper quadrant in place    Chief complaint: Same    HISTORY OF PRESENT ILLNESS:  The patient is a 56 y.o. male  who presents for reversal of/takedown of loop colostomy and right upper quadrant. Patient originally had left nephrectomy with densely adherent small bowel and colon requiring en bloc resection of both and postoperatively tolerated diet and moved bowels without difficulty. 2 weeks postoperatively patient represented to the emergency department with diffuse peritonitis and CAT scan demonstrating a leak from his colonic anastomosis. Patient underwent reexploration and found to have no obvious feculent peritonitis and had diverting proximal loop colostomy and right upper quadrant. Patient has since regained his strength and had recent contrast study demonstrating no evidence of leak at colonic anastomosis. Presents today for exploratory laparotomy and takedown of loop colostomy in right upper quadrant. Past Medical History:        Diagnosis Date    Arthritis     BILATERAL KNEES    Back pain     Colostomy RUQ 09/23/2020    Frequent headaches     10/28/2020 PATIENT STATES EVERY OTHER DAY.  Gout     History of atrial fibrillation     AFTER SURGERY ON 09/23/2020 WENT INTO A FIB. CONVERTED BACK TO NORMAL RHYTHM ON HIS OWN. CARDIOLOGIST DR Evelio Lim History of blood transfusion     NO REACTION    [place] (hard of hearing)     HTN     Maryfrances Drummer, CNP    Hyperlipidemia     Sleep apnea     USES CPAP MACHINE    T2DM     K.  Juana Amador CNP    Tooth missing     RIGHT UPPER 2ND MOLAR    Wears glasses     READING       Past Surgical History:        Procedure Laterality Date    ADENOIDECTOMY      TWICE AS A CHILD    ANKLE SURGERY Right 2010    RUPTURED ACHILES  CARPAL TUNNEL RELEASE Bilateral     COLOSTOMY      temporary    CYSTO/URETERO/PYELOSCOPY, CALCULUS TX Right 10/30/2020    HOLMIUM-STANDBY, CYSTOSCOPY, URETEROSCOPY, STENT EXCHANGE performed by Shaina Barroso MD at [address] [place] [address] Right 9/1/2020    CYSTOSCOPY RIGHT URETERAL STENT INSERTION performed by Gabriel Parekh MD at [address] [place] [address] Right 10/30/2020     HOLMIUM- CYSTOSCOPY, URETEROSCOPY, LASER LITHO, STENT EXCHANGE     HC CATH POWER PICC TRIPLE  09/03/2020    REMOVED AROUND 10/07/2020    KIDNEY SURGERY Left 9/2/2020    LAPAROSCOPIC XI ROBOTIC NEPHRECTOMY performed by Charlie Desouza MD at [address] [place] [address] ARTHROSCOPY Bilateral     KNEE SURGERY Bilateral     LAPAROTOMY N/A 9/23/2020    LAPAROTOMY EXPLORATORY performed by Brian Leach DO at [address] [place] [address] N/A 9/2/2020    EXPLORATORY LAPAROTOMY, RESECTION OF PROXIMAL JEJUNUM AND DESCENDING COLON WITH MOBILIZATION OF SPLENIC FLEXURE AND PRIMARY ANASTAMOSISOF SMALL BOWEL AND COLON, PLACEMENT OF GASTROSTOMY TUBE performed by Brian Leach DO at [address] [place] [address] 3 TIMES  AS A CHILD       Medications Prior to Admission:   Medications Prior to Admission: traMADol (ULTRAM) 50 MG tablet, Take 2 tablets by mouth every evening for 30 days.   DULoxetine (CYMBALTA) 20 MG extended release capsule, TAKE 1 CAPSULE BY MOUTH EVERY DAY  NOVOLOG FLEXPEN 100 UNIT/ML injection pen, Inject into the skin 3 times daily (before meals) SLIDING SCALE: BLOOD SUGAR =NO INSULIN.  140-199=1 UNIT, 200-249=2 UNITS, 250-299=3 UNITS, 300-349= 4 UNITS, 350-399=5 UNITS, >399= 6 UNITS  Melatonin 10 MG TABS, Take 1 tablet by mouth nightly as needed  Multiple Vitamins-Minerals (THERAPEUTIC MULTIVITAMIN-MINERALS) tablet, Take 1 tablet by mouth daily  ferrous sulfate (IRON 325) 325 (65 Fe) MG tablet, Take 1 tablet by mouth daily (with breakfast)  vitamin C (ASCORBIC ACID) 500 MG tablet, Take 500 mg by mouth daily cyclobenzaprine (FLEXERIL) 10 MG tablet, Take 1 tablet by mouth 3 times daily as needed for Muscle spasms (Patient taking differently: Take 10 mg by mouth 2 times daily as needed for Muscle spasms )  pravastatin (PRAVACHOL) 20 MG tablet, Take 20 mg by mouth daily  metoprolol (LOPRESSOR) 50 MG tablet, Take 50 mg by mouth 2 times daily  allopurinol (ZYLOPRIM) 100 MG tablet, Take 100 mg by mouth daily  zolpidem (AMBIEN) 10 MG tablet, Take by mouth nightly as needed for Sleep  hydrALAZINE (APRESOLINE) 25 MG tablet, Take 25 mg by mouth 2 times daily   apixaban (ELIQUIS) 5 MG TABS tablet, Take 1 tablet by mouth 2 times daily (Patient taking differently: Take 5 mg by mouth 2 times daily Stopped 12/16/2020)  CPAP Machine MISC, use as directed  B-D UF III MINI PEN NEEDLES 31G X 5 MM MISC,   Respiratory Therapy Supplies (CARETOUCH CPAP & BIPAP HOSE) MISC, Mask and Tubing  blood glucose monitor strips, Test 4 times a day & as needed for symptoms of irregular blood glucose. Dispense sufficient amount for indicated testing frequency plus additional to accommodate PRN testing needs.   acetaminophen (TYLENOL) 500 MG tablet, Take 2 tablets by mouth every 6 hours as needed for Pain  aspirin 81 MG tablet, Take 81 mg by mouth nightly Stopped 12/14/2020    Allergies:  Ampicillin, Pcn [penicillins], Sulfa antibiotics, and Tape Virgene Mings tape]    Social History:   Social History     Socioeconomic History    Marital status:      Spouse name: Not on file    Number of children: Not on file    Years of education: Not on file    Highest education level: Not on file   Occupational History    Not on file   Social Needs    Financial resource strain: Not on file    Food insecurity     Worry: Not on file     Inability: Not on file    Transportation needs     Medical: Not on file     Non-medical: Not on file   Tobacco Use    Smoking status: Never Smoker    Smokeless tobacco: Former User     Types: [address] [place] [address] Substance and Sexual Activity    Alcohol use: Not Currently     Alcohol/week: 0.0 standard drinks    Drug use: No    Sexual activity: Not Currently   Lifestyle    Physical activity     Days per week: Not on file     Minutes per session: Not on file    Stress: Not on file   Relationships    Social connections     Talks on phone: Not on file     Gets together: Not on file     Attends Hinduism service: Not on file     Active member of club or organization: Not on file     Attends meetings of clubs or organizations: Not on file     Relationship status: Not on file    Intimate partner violence     Fear of current or ex partner: Not on file     Emotionally abused: Not on file     Physically abused: Not on file     Forced sexual activity: Not on file   Other Topics Concern    Not on file   Social History Narrative    Not on file       Family History:       Problem Relation Age of Onset    Stroke Maternal Grandmother     Stroke Maternal Grandfather     Other Mother         AORTIC BYPASS LED TO KIDNEY FAILURE    Kidney Disease Mother     Diabetes Father        REVIEW OF SYSTEMS:    Review of Systems   Constitutional: Negative for activity change, chills and fatigue. HENT: Negative for congestion, drooling and facial swelling. Eyes: Negative for photophobia, pain and discharge. Respiratory: Negative for apnea, choking and shortness of breath. Cardiovascular: Negative for chest pain (Has treated hypertension and atrial fibrillation), palpitations and leg swelling. Gastrointestinal: Negative for abdominal distention, abdominal pain and blood in stool. Endocrine: Negative for cold intolerance, polydipsia and polyphagia (hAs treated insulin-dependent diabetes mellitus). Genitourinary: Negative for difficulty urinating, flank pain and genital sores. Musculoskeletal: Negative for arthralgias, gait problem and myalgias. Capillary Refill: Capillary refill takes less than 2 seconds. Coloration: Skin is not jaundiced. Findings: No bruising. Neurological:      General: No focal deficit present. Mental Status: He is alert and oriented to person, place, and time. Cranial Nerves: No cranial nerve deficit. Motor: No weakness. Gait: Gait normal.   Psychiatric:         Mood and Affect: Mood normal.         Behavior: Behavior normal.         Thought Content: Thought content normal.           CBC:   Lab Results   Component Value Date    WBC 8.4 11/10/2020    RBC 4.92 11/10/2020    HGB 13.3 11/10/2020    HCT 43.1 11/10/2020    MCV 87.6 11/10/2020    MCH 27.0 11/10/2020    MCHC 30.9 11/10/2020    RDW 15.5 11/10/2020     11/10/2020    MPV 11.4 11/10/2020     BMP:    Lab Results   Component Value Date     11/10/2020    K 4.6 11/10/2020     11/10/2020    CO2 19 11/10/2020    BUN 40 11/10/2020    LABALBU 4.1 11/10/2020    CREATININE 1.45 11/10/2020    CALCIUM 10.4 11/10/2020    GFRAA 60 11/10/2020    LABGLOM 49 11/10/2020    GLUCOSE 118 11/10/2020       Pertinent Radiology:    No orders to display     None    ASSESSMENT   Colostomy in place status post small bowel and colonic resection for near obstructing left renal mass with postoperative colonic delayed leak and proximal diversion. PLAN    1. Reversal of colostomy/ exploratory laparotomy. 2. Consent is signed and witnessed and questions are answered.         Electronically signed by Yves Skiff, DO  on 12/18/2020 at 6:44 AM

## 2020-12-18 NOTE — OP NOTE
Operative Note      Patient: Breanna Blandon  YOB: 1960  MRN: [id number]    Date of Procedure: 12/18/2020    Pre-Op Diagnosis: STATUS POST COLOSTOMY FROM DIVERTICULITIS loop colostomy at hepatic flexure    Post-Op Diagnosis: Same       Procedure(s):  EXPLORATORY LAPAROTOMY,COLOSTOMY REVERSAL, TAP BLOCK DONE IN OR BY DR Nir Anthony    Surgeon(s):  Tata Kilgore DO    Assistant:   Resident: uZrdo Crouch DO    Anesthesia: General    Estimated Blood Loss (mL): 30 mL    Complications: None    Specimens:   ID Type Source Tests Collected by Time Destination   A : COLOSTOMY SITE Tissue Colon SURGICAL PATHOLOGY Tata Kilgore DO 12/18/2020 3122          Drains:   Urethral Catheter Non-latex 16 fr (Active)       [REMOVED] Gastrostomy/Enterostomy/Jejunostomy Gastrostomy LUQ 20 fr (Removed)       [REMOVED] Colostomy RUQ Transverse (Removed)       Findings: Wound class 2    Indications: This is a 47-year-old male status post proximal diverting loop colostomy creation for diverticulitis 3 months prior after developing anastomotic leak 2 weeks postoperatively. Patient was medically optimized and the decision was made to take the patient back to the operating room for takedown of loop colostomy. Consent: Procedure explained in detail along with risk versus benefits and alternatives. All questions and concerns were addressed and informed written consent was obtained and please make sure. Detailed Description of Procedure: The patient was brought to the operating room placed on the OR table in supine position. General anesthesia was induced by the anesthesia team.  A formal timeout identifying the patient, procedure, allergies, and antibiotics was performed. Bilateral EPC cuffs and Bryant catheter were placed. The abdomen was clipped, prepped with ChloraPrep, and draped in usual sterile fashion. Midline laparotomy incision was made along the length of previous scar which was excised and discarded. The incision was carried down through the subcutaneous tissue to the anterior abdominal wall fascia which was scored and previous PDS suture removed. Minimal lysis of adhesions were performed to the small intestine. The colostomy from the intraperitoneum was identified and adhesions circumferentially were taken down to free the loop colostomy from the surrounding abdominal wall. Attention was turned towards the abdominal wall. A circumferential skin incision was made 1 mm away from the colostomy. Incision was carried down through the dermis and subcutaneous tissue using electrocautery. At th the colostomy was completely freed from the abdominal wall circumferentially and dunked into the peritoneum. A LARISSA 75 blue load stapler was inserted into either end of the loop colostomy and a common channel was created. A TA stapler was stapled across the distal portion of the common channel however there was extensive scar tissue from the previous colostomy. This left is short common channel and there was concerns for future stenosis. The transverse colon was mobilized from surrounding adhesions to obtain further length in the right colon was completely freed. 2 enterotomies were made at the antimesenteric border of the colon just adjacent to where the common channel of the previous staple line ended. An additional LARISSA blue load 75 stapler was inserted into the enterotomies and a common channel was created between the 2 limbs of colon with adequate length. LARISSA stapler x2 were used to transect the previously created anastomosis. The common channel enterotomy was sutured closed with a 4-0 PDS in a double layer fashion and hydraulically tested and found to be secure. This completed the creation of the anastomosis. A crotch stitch of 4-0 Prolene was also placed for decreasing tension on the staple line. The anastomosis was placed within the abdomen and covered by omentum.   The abdominal wall of the old ostomy incision was closed with 2 separate 2-0 vicryl sutures starting at each end of the incision carried towards midline and tied down. The abdominal wall was closed using 2 separate 0 looped PDS sutures starting at each end of the incision and carried towards midline. The skin was loosely approximated with skin staples and iodoform packing within the incision. Fluffs, ABD pads and tapes were placed over the midline and colostomy incisions. This concluded the procedure. The patient tolerated well without immediate complications. All sponge, needles, instrument counts were correct at the end of the case. The patient was awoken in the operating room, extubated, and transferred to PACU in stable condition. Dr. Channing Campbell MD, was present throughout the entirety of the procedure. Electronically signed by Rao Canela DO on 12/18/2020 at 9:47 AM   I attest that I was present throughout the operation and agree with the description of the procedure as dictated above.   Yadira Greer MD

## 2020-12-18 NOTE — PROGRESS NOTES
[phone] Dr Isabel Prieto to the bedside in OR 7,  time out performed, Pt monitored and continue intubated,George Tap  nerve block completed using Bupivacaine, 0.5% 20 ml to each side,  pt tolerated procedure well,  Site CDI, (see charting) vss  Versed Given: 0 mg  Fentanyl  0 mcg.

## 2020-12-18 NOTE — PROGRESS NOTES
POST-OPERATIVE PROGRESS NOTE    Patient: Rachelle Zuniga II    DATE: 12/18/2020    Surgery: Exploratory laparotomy, colostomy reversal     Subjective:   Patient seen and examined. He reports that his pain is currently controlled. He denies any nausea or vomiting. He has not passed flatus or had a bowel movement. Denies fever or chills. Has not urinated since surgery. Objective:  Vital signs and Nurse's note reviewed  Post-op vital signs: stable    /78   Pulse 92   Temp 98.8 °F (37.1 °C) (Oral)   Resp 16   Ht 5' 11\" (1.803 m)   Wt 214 lb 11.7 oz (97.4 kg)   SpO2 98%   BMI 29.95 kg/m²    Gen:  A&Ox3, NAD  CV: Regular rate   Resp: No acute respiratory distress or accessory muscle use  Abd:  Soft, nondistended, appropriately tender to palpation. Midline dressing is clean, dry, and intact  Ext:  Warm, no cyanosis or edema    Assessment:   POD # 0 S/P exploratory laparotomy, colostomy reversal  Recovering well post-op     Plan:    Continue current care  Pain control: Tylenol, morphine and Roxicodone as needed  Diet: Clear liquid diet  Monitor urine output. Bladder scan and straight cath as needed.     Electronically signed by Usha Godoy DO  on 12/18/2020 at 6:42 PM

## 2020-12-18 NOTE — ANESTHESIA PRE PROCEDURE
Department of Anesthesiology  Preprocedure Note       Name:  Pakistani Eye II   Age:  56 y.o.  :  1960                                          MRN:  [id number]         Date:  2020      Surgeon: Oneal Pradhan):  Brian Leach DO    Procedure: Procedure(s):  COLOSTOMY REVERSAL    Medications prior to admission:   Prior to Admission medications    Medication Sig Start Date End Date Taking? Authorizing Provider   traMADol (ULTRAM) 50 MG tablet Take 2 tablets by mouth every evening for 30 days. 12/11/20 1/10/21  Noreen Gottron, APRN - CNP   apixaban (ELIQUIS) 5 MG TABS tablet Take 1 tablet by mouth 2 times daily  Patient taking differently: Take 5 mg by mouth 2 times daily Stopped 2020   Noreen Gottron, APRN - CNP   DULoxetine (CYMBALTA) 20 MG extended release capsule TAKE 1 CAPSULE BY MOUTH EVERY DAY 20   Noreen Gottron, APRN - CNP   CPAP Machine MISC use as directed    Historical Provider, MD   NOVOLOG FLEXPEN 100 UNIT/ML injection pen Inject into the skin 3 times daily (before meals) SLIDING SCALE: BLOOD SUGAR =NO INSULIN.   140-199=1 UNIT,  200-249=2 UNITS,  250-299=3 UNITS,  300-349= 4 UNITS,  350-399=5 UNITS,  >399= 6 UNITS 20   Historical Provider, MD NG UF III MINI PEN NEEDLES 31G X 5 MM MISC  20   Historical Provider, MD   Respiratory Therapy Supplies (CARETOUCH CPAP & BIPAP HOSE) MISC Mask and Tubing 20   Historical Provider, MD   Melatonin 10 MG TABS Take 1 tablet by mouth nightly as needed    Historical Provider, MD   Multiple Vitamins-Minerals (THERAPEUTIC MULTIVITAMIN-MINERALS) tablet Take 1 tablet by mouth daily 10/3/20   Joni Travis MD   ferrous sulfate (IRON 325) 325 (65 Fe) MG tablet Take 1 tablet by mouth daily (with breakfast) 20   Quan Willett MD   blood glucose monitor strips Test 4 times a day & as needed for symptoms of irregular blood glucose.  Dispense sufficient amount for indicated testing frequency plus additional to accommodate PRN testing needs. 9/11/20   Teodora Moses MD   vitamin C (ASCORBIC ACID) 500 MG tablet Take 500 mg by mouth daily    Historical Provider, MD   acetaminophen (TYLENOL) 500 MG tablet Take 2 tablets by mouth every 6 hours as needed for Pain 8/11/20 12/16/20  Kj Leach MD   cyclobenzaprine (FLEXERIL) 10 MG tablet Take 1 tablet by mouth 3 times daily as needed for Muscle spasms  Patient taking differently: Take 10 mg by mouth 2 times daily as needed for Muscle spasms  8/11/20   Kj Leach MD   aspirin 81 MG tablet Take 81 mg by mouth nightly Stopped 12/14/2020    Historical Provider, MD   pravastatin (PRAVACHOL) 20 MG tablet Take 20 mg by mouth daily    Historical Provider, MD   metoprolol (LOPRESSOR) 50 MG tablet Take 50 mg by mouth 2 times daily    Historical Provider, MD   allopurinol (ZYLOPRIM) 100 MG tablet Take 100 mg by mouth daily    Historical Provider, MD   zolpidem (AMBIEN) 10 MG tablet Take by mouth nightly as needed for Sleep    Historical Provider, MD   hydrALAZINE (APRESOLINE) 25 MG tablet Take 25 mg by mouth 2 times daily     Historical Provider, MD       Current medications:    No current facility-administered medications for this visit. No current outpatient medications on file. Facility-Administered Medications Ordered in Other Visits   Medication Dose Route Frequency Provider Last Rate Last Admin    levoFLOXacin (LEVAQUIN) 500 MG/100ML infusion 500 mg  500 mg Intravenous Once Jagjit M Soren, DO        metronidazole (FLAGYL) 500 mg in NaCl 100 mL IVPB premix  500 mg Intravenous Once Jagjit M Soren, DO        naloxegol (MOVANTIK) tablet 25 mg  25 mg Oral 60 Min Pre-Op Lilia Gomested, DO           Allergies: Allergies   Allergen Reactions    Ampicillin Swelling     Swelling of throat.  Pcn [Penicillins] Swelling     Throat swelling. Tolerated cefepime during 8/31/20 admission.     Sulfa Antibiotics      Other reaction(s): Unknown    Tape Suzzane Belmond Tape] Other NEPHRECTOMY performed by Deepti Christensen MD at [facility] ARTHROSCOPY Bilateral     KNEE SURGERY Bilateral     LAPAROTOMY N/A 9/23/2020    LAPAROTOMY EXPLORATORY performed by Khris Dela Cruz DO at [facility] N/A 9/2/2020    EXPLORATORY LAPAROTOMY, RESECTION OF PROXIMAL JEJUNUM AND DESCENDING COLON WITH MOBILIZATION OF SPLENIC FLEXURE AND PRIMARY ANASTAMOSISOF SMALL BOWEL AND COLON, PLACEMENT OF GASTROSTOMY TUBE performed by Khris Dela Cruz DO at [address] 3 TIMES  AS A CHILD       Social History:    Social History     Tobacco Use    Smoking status: Never Smoker    Smokeless tobacco: Former User     Types: Chew   Substance Use Topics    Alcohol use: Not Currently     Alcohol/week: 0.0 standard drinks                                Counseling given: Not Answered      Vital Signs (Current): There were no vitals filed for this visit.                                            BP Readings from Last 3 Encounters:   11/02/20 112/62   10/30/20 126/81   10/30/20 (!) 131/93       NPO Status:                                                                                 BMI:   Wt Readings from Last 3 Encounters:   12/18/20 214 lb 11.7 oz (97.4 kg)   11/02/20 215 lb 6.4 oz (97.7 kg)   10/30/20 215 lb 9.8 oz (97.8 kg)     There is no height or weight on file to calculate BMI.    CBC:   Lab Results   Component Value Date    WBC 8.4 11/10/2020    RBC 4.92 11/10/2020    HGB 13.3 11/10/2020    HCT 43.1 11/10/2020    MCV 87.6 11/10/2020    RDW 15.5 11/10/2020     11/10/2020       CMP:   Lab Results   Component Value Date     11/10/2020    K 4.6 11/10/2020     11/10/2020    CO2 19 11/10/2020    BUN 40 11/10/2020    CREATININE 1.45 11/10/2020    GFRAA 60 11/10/2020    LABGLOM 49 11/10/2020    GLUCOSE 118 11/10/2020    PROT 7.9 11/10/2020    CALCIUM 10.4 11/10/2020    BILITOT 0.33 11/10/2020    ALKPHOS 94 11/10/2020    AST 25 11/10/2020    ALT 38 11/10/2020       POC Tests:   No results for input(s): POCGLU, POCNA, POCK, POCCL, POCBUN, POCHEMO, POCHCT in the last 72 hours. Coags:   Lab Results   Component Value Date    PROTIME 12.2 09/29/2020    INR 1.2 09/29/2020    APTT 72.6 10/01/2020       HCG (If Applicable): No results found for: PREGTESTUR, PREGSERUM, HCG, HCGQUANT     ABGs:   Lab Results   Component Value Date    PHART 7.363 09/23/2020    PO2ART 187.0 09/23/2020    GBY9OBA 34.3 09/23/2020    CFO1WPH 19.0 09/23/2020    F5JNGWBU 99.3 09/23/2020        Type & Screen (If Applicable):  No results found for: LABABO, [address]    Drug/Infectious Status (If Applicable):  Lab Results   Component Value Date    HEPCAB NONREACTIVE 11/10/2020       COVID-19 Screening (If Applicable):   Lab Results   Component Value Date    COVID19 Not Detected 12/14/2020    COVID19 Not Detected 10/26/2020         Anesthesia Evaluation  Patient summary reviewed no history of anesthetic complications:   Airway: Mallampati: II  TM distance: >3 FB   Neck ROM: full  Mouth opening: > = 3 FB Dental:          Pulmonary:Negative Pulmonary ROS and normal exam    (+) sleep apnea:                             Cardiovascular:    (+) hypertension: no interval change,       ECG reviewed  Rhythm: regular  Rate: normal                    Neuro/Psych:   Negative Neuro/Psych ROS  (+) headaches:,             GI/Hepatic/Renal: Neg GI/Hepatic/Renal ROS            Endo/Other:    (+) DiabetesType II DM, no interval change, using insulin, malignancy/cancer. Abdominal:   (+) obese,         Vascular: negative vascular ROS. Anesthesia Plan      general and regional     ASA 3     (Tap block)  Induction: intravenous. MIPS: Postoperative opioids intended and Prophylactic antiemetics administered. Anesthetic plan and risks discussed with patient. Plan discussed with CRNA.     Attending anesthesiologist reviewed and agrees with Pre Eval content              Randal Gilford, MD   12/18/2020

## 2020-12-19 LAB
ABSOLUTE EOS #: 0.15 K/UL (ref 0–0.44)
ABSOLUTE IMMATURE GRANULOCYTE: 0.04 K/UL (ref 0–0.3)
ABSOLUTE LYMPH #: 1.66 K/UL (ref 1.1–3.7)
ABSOLUTE MONO #: 1.22 K/UL (ref 0.1–1.2)
ANION GAP SERPL CALCULATED.3IONS-SCNC: 11 MMOL/L (ref 9–17)
BASOPHILS # BLD: 0 % (ref 0–2)
BASOPHILS ABSOLUTE: 0.04 K/UL (ref 0–0.2)
BUN BLDV-MCNC: 32 MG/DL (ref 8–23)
BUN/CREAT BLD: ABNORMAL (ref 9–20)
CALCIUM SERPL-MCNC: 9.2 MG/DL (ref 8.6–10.4)
CHLORIDE BLD-SCNC: 106 MMOL/L (ref 98–107)
CO2: 17 MMOL/L (ref 20–31)
CREAT SERPL-MCNC: 1.29 MG/DL (ref 0.7–1.2)
DIFFERENTIAL TYPE: ABNORMAL
EKG ATRIAL RATE: 128 BPM
EKG P-R INTERVAL: 158 MS
EKG Q-T INTERVAL: 302 MS
EKG QRS DURATION: 80 MS
EKG QTC CALCULATION (BAZETT): 440 MS
EKG R AXIS: -176 DEGREES
EKG T AXIS: 152 DEGREES
EKG VENTRICULAR RATE: 128 BPM
EOSINOPHILS RELATIVE PERCENT: 1 % (ref 1–4)
GFR AFRICAN AMERICAN: >60 ML/MIN
GFR NON-AFRICAN AMERICAN: 56 ML/MIN
GFR SERPL CREATININE-BSD FRML MDRD: ABNORMAL ML/MIN/{1.73_M2}
GFR SERPL CREATININE-BSD FRML MDRD: ABNORMAL ML/MIN/{1.73_M2}
GLUCOSE BLD-MCNC: 103 MG/DL (ref 70–99)
GLUCOSE BLD-MCNC: 107 MG/DL (ref 75–110)
GLUCOSE BLD-MCNC: 108 MG/DL (ref 75–110)
GLUCOSE BLD-MCNC: 119 MG/DL (ref 75–110)
GLUCOSE BLD-MCNC: 122 MG/DL (ref 75–110)
GLUCOSE BLD-MCNC: 122 MG/DL (ref 75–110)
GLUCOSE BLD-MCNC: 129 MG/DL (ref 75–110)
HCT VFR BLD CALC: 41.7 % (ref 40.7–50.3)
HEMOGLOBIN: 13.2 G/DL (ref 13–17)
IMMATURE GRANULOCYTES: 0 %
LYMPHOCYTES # BLD: 15 % (ref 24–43)
MCH RBC QN AUTO: 29.1 PG (ref 25.2–33.5)
MCHC RBC AUTO-ENTMCNC: 31.7 G/DL (ref 28.4–34.8)
MCV RBC AUTO: 92.1 FL (ref 82.6–102.9)
MONOCYTES # BLD: 11 % (ref 3–12)
NRBC AUTOMATED: 0 PER 100 WBC
PDW BLD-RTO: 15.6 % (ref 11.8–14.4)
PLATELET # BLD: 212 K/UL (ref 138–453)
PLATELET ESTIMATE: ABNORMAL
PMV BLD AUTO: 10.9 FL (ref 8.1–13.5)
POTASSIUM SERPL-SCNC: 4.9 MMOL/L (ref 3.7–5.3)
RBC # BLD: 4.53 M/UL (ref 4.21–5.77)
RBC # BLD: ABNORMAL 10*6/UL
SEG NEUTROPHILS: 73 % (ref 36–65)
SEGMENTED NEUTROPHILS ABSOLUTE COUNT: 8.21 K/UL (ref 1.5–8.1)
SODIUM BLD-SCNC: 134 MMOL/L (ref 135–144)
WBC # BLD: 11.3 K/UL (ref 3.5–11.3)
WBC # BLD: ABNORMAL 10*3/UL

## 2020-12-19 PROCEDURE — 6360000002 HC RX W HCPCS: Performed by: STUDENT IN AN ORGANIZED HEALTH CARE EDUCATION/TRAINING PROGRAM

## 2020-12-19 PROCEDURE — 85025 COMPLETE CBC W/AUTO DIFF WBC: CPT

## 2020-12-19 PROCEDURE — 2580000003 HC RX 258: Performed by: STUDENT IN AN ORGANIZED HEALTH CARE EDUCATION/TRAINING PROGRAM

## 2020-12-19 PROCEDURE — 51798 US URINE CAPACITY MEASURE: CPT

## 2020-12-19 PROCEDURE — 80048 BASIC METABOLIC PNL TOTAL CA: CPT

## 2020-12-19 PROCEDURE — 6370000000 HC RX 637 (ALT 250 FOR IP): Performed by: STUDENT IN AN ORGANIZED HEALTH CARE EDUCATION/TRAINING PROGRAM

## 2020-12-19 PROCEDURE — 93005 ELECTROCARDIOGRAM TRACING: CPT | Performed by: STUDENT IN AN ORGANIZED HEALTH CARE EDUCATION/TRAINING PROGRAM

## 2020-12-19 PROCEDURE — 93010 ELECTROCARDIOGRAM REPORT: CPT | Performed by: INTERNAL MEDICINE

## 2020-12-19 PROCEDURE — 2500000003 HC RX 250 WO HCPCS: Performed by: STUDENT IN AN ORGANIZED HEALTH CARE EDUCATION/TRAINING PROGRAM

## 2020-12-19 PROCEDURE — 36415 COLL VENOUS BLD VENIPUNCTURE: CPT

## 2020-12-19 PROCEDURE — 82947 ASSAY GLUCOSE BLOOD QUANT: CPT

## 2020-12-19 PROCEDURE — 1200000000 HC SEMI PRIVATE

## 2020-12-19 RX ORDER — METOPROLOL TARTRATE 5 MG/5ML
5 INJECTION INTRAVENOUS EVERY 6 HOURS PRN
Status: DISCONTINUED | OUTPATIENT
Start: 2020-12-19 | End: 2020-12-21 | Stop reason: HOSPADM

## 2020-12-19 RX ORDER — METOPROLOL TARTRATE 50 MG/1
50 TABLET, FILM COATED ORAL 2 TIMES DAILY
Status: DISCONTINUED | OUTPATIENT
Start: 2020-12-19 | End: 2020-12-21 | Stop reason: HOSPADM

## 2020-12-19 RX ORDER — SODIUM CHLORIDE, SODIUM LACTATE, POTASSIUM CHLORIDE, AND CALCIUM CHLORIDE .6; .31; .03; .02 G/100ML; G/100ML; G/100ML; G/100ML
500 INJECTION, SOLUTION INTRAVENOUS ONCE
Status: COMPLETED | OUTPATIENT
Start: 2020-12-19 | End: 2020-12-19

## 2020-12-19 RX ORDER — DEXTROSE, SODIUM CHLORIDE, AND POTASSIUM CHLORIDE 5; .45; .15 G/100ML; G/100ML; G/100ML
INJECTION INTRAVENOUS CONTINUOUS
Status: DISCONTINUED | OUTPATIENT
Start: 2020-12-19 | End: 2020-12-20

## 2020-12-19 RX ADMIN — FAMOTIDINE 20 MG: 10 INJECTION INTRAVENOUS at 21:51

## 2020-12-19 RX ADMIN — HEPARIN SODIUM 5000 UNITS: 5000 INJECTION INTRAVENOUS; SUBCUTANEOUS at 13:48

## 2020-12-19 RX ADMIN — METOPROLOL TARTRATE 50 MG: 50 TABLET, FILM COATED ORAL at 08:33

## 2020-12-19 RX ADMIN — ACETAMINOPHEN 1000 MG: 500 TABLET ORAL at 06:11

## 2020-12-19 RX ADMIN — OXYCODONE HYDROCHLORIDE 5 MG: 5 TABLET ORAL at 13:48

## 2020-12-19 RX ADMIN — LEVOFLOXACIN 500 MG: 5 INJECTION, SOLUTION INTRAVENOUS at 08:33

## 2020-12-19 RX ADMIN — HEPARIN SODIUM 5000 UNITS: 5000 INJECTION INTRAVENOUS; SUBCUTANEOUS at 21:51

## 2020-12-19 RX ADMIN — METOPROLOL TARTRATE 50 MG: 50 TABLET, FILM COATED ORAL at 00:38

## 2020-12-19 RX ADMIN — MORPHINE SULFATE 4 MG: 4 INJECTION INTRAVENOUS at 06:38

## 2020-12-19 RX ADMIN — MORPHINE SULFATE 4 MG: 4 INJECTION INTRAVENOUS at 10:32

## 2020-12-19 RX ADMIN — NALOXEGOL OXALATE 25 MG: 12.5 TABLET, FILM COATED ORAL at 08:33

## 2020-12-19 RX ADMIN — ACETAMINOPHEN 1000 MG: 500 TABLET ORAL at 21:52

## 2020-12-19 RX ADMIN — MORPHINE SULFATE 4 MG: 4 INJECTION INTRAVENOUS at 02:04

## 2020-12-19 RX ADMIN — DULOXETINE 20 MG: 20 CAPSULE, DELAYED RELEASE ORAL at 09:50

## 2020-12-19 RX ADMIN — FAMOTIDINE 20 MG: 10 INJECTION INTRAVENOUS at 08:33

## 2020-12-19 RX ADMIN — METOPROLOL TARTRATE 50 MG: 50 TABLET, FILM COATED ORAL at 21:52

## 2020-12-19 RX ADMIN — METRONIDAZOLE 500 MG: 500 INJECTION, SOLUTION INTRAVENOUS at 04:00

## 2020-12-19 RX ADMIN — ALLOPURINOL 100 MG: 100 TABLET ORAL at 08:33

## 2020-12-19 RX ADMIN — MORPHINE SULFATE 4 MG: 4 INJECTION INTRAVENOUS at 14:54

## 2020-12-19 RX ADMIN — MORPHINE SULFATE 4 MG: 4 INJECTION INTRAVENOUS at 19:28

## 2020-12-19 RX ADMIN — METRONIDAZOLE 500 MG: 500 INJECTION, SOLUTION INTRAVENOUS at 12:48

## 2020-12-19 RX ADMIN — OXYCODONE HYDROCHLORIDE 5 MG: 5 TABLET ORAL at 00:29

## 2020-12-19 RX ADMIN — HEPARIN SODIUM 5000 UNITS: 5000 INJECTION INTRAVENOUS; SUBCUTANEOUS at 06:59

## 2020-12-19 RX ADMIN — MORPHINE SULFATE 4 MG: 4 INJECTION INTRAVENOUS at 21:52

## 2020-12-19 RX ADMIN — MORPHINE SULFATE 4 MG: 4 INJECTION INTRAVENOUS at 04:05

## 2020-12-19 RX ADMIN — PRAVASTATIN SODIUM 20 MG: 20 TABLET ORAL at 08:33

## 2020-12-19 RX ADMIN — POTASSIUM CHLORIDE, DEXTROSE MONOHYDRATE AND SODIUM CHLORIDE: 150; 5; 450 INJECTION, SOLUTION INTRAVENOUS at 18:23

## 2020-12-19 RX ADMIN — OXYCODONE HYDROCHLORIDE 5 MG: 5 TABLET ORAL at 05:30

## 2020-12-19 RX ADMIN — ACETAMINOPHEN 1000 MG: 500 TABLET ORAL at 13:48

## 2020-12-19 RX ADMIN — OXYCODONE HYDROCHLORIDE 5 MG: 5 TABLET ORAL at 09:38

## 2020-12-19 RX ADMIN — SODIUM CHLORIDE, POTASSIUM CHLORIDE, SODIUM LACTATE AND CALCIUM CHLORIDE: 600; 310; 30; 20 INJECTION, SOLUTION INTRAVENOUS at 07:28

## 2020-12-19 RX ADMIN — MORPHINE SULFATE 4 MG: 4 INJECTION INTRAVENOUS at 12:48

## 2020-12-19 RX ADMIN — SODIUM CHLORIDE, POTASSIUM CHLORIDE, SODIUM LACTATE AND CALCIUM CHLORIDE 500 ML: 600; 310; 30; 20 INJECTION, SOLUTION INTRAVENOUS at 00:29

## 2020-12-19 RX ADMIN — POTASSIUM CHLORIDE, DEXTROSE MONOHYDRATE AND SODIUM CHLORIDE: 150; 5; 450 INJECTION, SOLUTION INTRAVENOUS at 09:38

## 2020-12-19 RX ADMIN — HEPARIN SODIUM 5000 UNITS: 5000 INJECTION INTRAVENOUS; SUBCUTANEOUS at 00:06

## 2020-12-19 RX ADMIN — MORPHINE SULFATE 4 MG: 4 INJECTION INTRAVENOUS at 08:35

## 2020-12-19 RX ADMIN — OXYCODONE HYDROCHLORIDE 5 MG: 5 TABLET ORAL at 22:59

## 2020-12-19 RX ADMIN — OXYCODONE HYDROCHLORIDE 5 MG: 5 TABLET ORAL at 18:19

## 2020-12-19 ASSESSMENT — PAIN SCALES - GENERAL
PAINLEVEL_OUTOF10: 7
PAINLEVEL_OUTOF10: 7
PAINLEVEL_OUTOF10: 8
PAINLEVEL_OUTOF10: 7
PAINLEVEL_OUTOF10: 7
PAINLEVEL_OUTOF10: 5
PAINLEVEL_OUTOF10: 8
PAINLEVEL_OUTOF10: 7
PAINLEVEL_OUTOF10: 7
PAINLEVEL_OUTOF10: 4
PAINLEVEL_OUTOF10: 7
PAINLEVEL_OUTOF10: 7
PAINLEVEL_OUTOF10: 8
PAINLEVEL_OUTOF10: 7
PAINLEVEL_OUTOF10: 5
PAINLEVEL_OUTOF10: 5
PAINLEVEL_OUTOF10: 8
PAINLEVEL_OUTOF10: 7
PAINLEVEL_OUTOF10: 8
PAINLEVEL_OUTOF10: 7
PAINLEVEL_OUTOF10: 7
PAINLEVEL_OUTOF10: 5
PAINLEVEL_OUTOF10: 7

## 2020-12-19 ASSESSMENT — PAIN DESCRIPTION - LOCATION
LOCATION: ABDOMEN

## 2020-12-19 ASSESSMENT — PAIN DESCRIPTION - PAIN TYPE
TYPE: SURGICAL PAIN

## 2020-12-19 ASSESSMENT — PAIN DESCRIPTION - FREQUENCY
FREQUENCY: CONTINUOUS

## 2020-12-19 NOTE — PROGRESS NOTES
Went up to to re-assess patient and check progress on urine output. Patient's heart rate 130's. Reporting some surgical site pain. Given PRN pain medication. Denies chest pain, shortness of breath, palpitations, dizziness or lightheadedness. He had voided 200 cc of concentrated urine. Bladder scan for 0 cc post void. Will order EKG and 500 cc LR bolus. Patient's home metoprolol dose confirmed and re-started. Will continue to monitor VS and urine output.      Jesika Ramirez,  PGY-1

## 2020-12-19 NOTE — PROGRESS NOTES
General Surgery:  Daily Progress Note                  PATIENT NAME: Dutch Cadena II     TODAY'S DATE: 12/19/2020, 7:02 AM  CC:  Elevated heart rate    SUBJECTIVE:     Pt seen and examined at bedside. Doing well. UO marginal overnight. Pain relatively controlled. HR to 130 overnight with hx of atrial fibrillation. Denies current nausea or vomiting. Tolerated clears. OBJECTIVE:   VITALS:  /82   Pulse 117   Temp 96.7 °F (35.9 °C) (Oral)   Resp 16   Ht 5' 11\" (1.803 m)   Wt 214 lb 11.7 oz (97.4 kg)   SpO2 98%   BMI 29.95 kg/m²      INTAKE/OUTPUT:      Intake/Output Summary (Last 24 hours) at 12/19/2020 8911  Last data filed at 12/19/2020 0304  Gross per 24 hour   Intake 1800 ml   Output 230 ml   Net 1570 ml       PHYSICAL EXAM:  General Appearance: awake, alert, oriented, in no acute distress  HEENT:  Normocephalic, atraumatic, mucus membranes moist   Heart: tachycardic, regular rhythm  Lungs: no respiratory distress  Abdomen: Soft, non distended, tender to palpation around incision sites -- appropriate post operative pain. Surgical dressing in place with minimal strike through   Extremities: No cyanosis, pitting edema, rashes noted. Skin: Skin color, texture, turgor normal. No rashes or lesions.       Data:  CBC with Differential:    Lab Results   Component Value Date    WBC 11.3 12/19/2020    RBC 4.53 12/19/2020    HGB 13.2 12/19/2020    HCT 41.7 12/19/2020     12/19/2020    MCV 92.1 12/19/2020    MCH 29.1 12/19/2020    MCHC 31.7 12/19/2020    RDW 15.6 12/19/2020    LYMPHOPCT 15 12/19/2020    MONOPCT 11 12/19/2020    BASOPCT 0 12/19/2020    MONOSABS 1.22 12/19/2020    LYMPHSABS 1.66 12/19/2020    EOSABS 0.15 12/19/2020    BASOSABS 0.04 12/19/2020    DIFFTYPE NOT REPORTED 12/19/2020     BMP:    Lab Results   Component Value Date     11/10/2020    K 4.6 11/10/2020     11/10/2020    CO2 19 11/10/2020    BUN 40 11/10/2020    KAELA 4.1 11/10/2020 Patient informed of message below and will call to schedule CT ASAP. Message also forwarded to PA team to start authorization.

## 2020-12-20 LAB
ABSOLUTE EOS #: 0.24 K/UL (ref 0–0.44)
ABSOLUTE IMMATURE GRANULOCYTE: 0.04 K/UL (ref 0–0.3)
ABSOLUTE LYMPH #: 0.96 K/UL (ref 1.1–3.7)
ABSOLUTE MONO #: 0.86 K/UL (ref 0.1–1.2)
ANION GAP SERPL CALCULATED.3IONS-SCNC: 10 MMOL/L (ref 9–17)
BASOPHILS # BLD: 0 % (ref 0–2)
BASOPHILS ABSOLUTE: <0.03 K/UL (ref 0–0.2)
BUN BLDV-MCNC: 25 MG/DL (ref 8–23)
BUN/CREAT BLD: ABNORMAL (ref 9–20)
CALCIUM SERPL-MCNC: 9.4 MG/DL (ref 8.6–10.4)
CHLORIDE BLD-SCNC: 106 MMOL/L (ref 98–107)
CO2: 19 MMOL/L (ref 20–31)
CREAT SERPL-MCNC: 1.28 MG/DL (ref 0.7–1.2)
DIFFERENTIAL TYPE: ABNORMAL
EOSINOPHILS RELATIVE PERCENT: 2 % (ref 1–4)
GFR AFRICAN AMERICAN: >60 ML/MIN
GFR NON-AFRICAN AMERICAN: 57 ML/MIN
GFR SERPL CREATININE-BSD FRML MDRD: ABNORMAL ML/MIN/{1.73_M2}
GFR SERPL CREATININE-BSD FRML MDRD: ABNORMAL ML/MIN/{1.73_M2}
GLUCOSE BLD-MCNC: 103 MG/DL (ref 75–110)
GLUCOSE BLD-MCNC: 107 MG/DL (ref 75–110)
GLUCOSE BLD-MCNC: 116 MG/DL (ref 75–110)
GLUCOSE BLD-MCNC: 117 MG/DL (ref 75–110)
GLUCOSE BLD-MCNC: 119 MG/DL (ref 75–110)
GLUCOSE BLD-MCNC: 122 MG/DL (ref 70–99)
HCT VFR BLD CALC: 40.5 % (ref 40.7–50.3)
HEMOGLOBIN: 12.3 G/DL (ref 13–17)
IMMATURE GRANULOCYTES: 0 %
LYMPHOCYTES # BLD: 9 % (ref 24–43)
MCH RBC QN AUTO: 28.6 PG (ref 25.2–33.5)
MCHC RBC AUTO-ENTMCNC: 30.4 G/DL (ref 28.4–34.8)
MCV RBC AUTO: 94.2 FL (ref 82.6–102.9)
MONOCYTES # BLD: 8 % (ref 3–12)
NRBC AUTOMATED: 0 PER 100 WBC
PDW BLD-RTO: 15.6 % (ref 11.8–14.4)
PLATELET # BLD: 189 K/UL (ref 138–453)
PLATELET ESTIMATE: ABNORMAL
PMV BLD AUTO: 10.8 FL (ref 8.1–13.5)
POTASSIUM SERPL-SCNC: 4.7 MMOL/L (ref 3.7–5.3)
RBC # BLD: 4.3 M/UL (ref 4.21–5.77)
RBC # BLD: ABNORMAL 10*6/UL
SEG NEUTROPHILS: 81 % (ref 36–65)
SEGMENTED NEUTROPHILS ABSOLUTE COUNT: 8.08 K/UL (ref 1.5–8.1)
SODIUM BLD-SCNC: 135 MMOL/L (ref 135–144)
WBC # BLD: 10.2 K/UL (ref 3.5–11.3)
WBC # BLD: ABNORMAL 10*3/UL

## 2020-12-20 PROCEDURE — 82947 ASSAY GLUCOSE BLOOD QUANT: CPT

## 2020-12-20 PROCEDURE — 1200000000 HC SEMI PRIVATE

## 2020-12-20 PROCEDURE — 36415 COLL VENOUS BLD VENIPUNCTURE: CPT

## 2020-12-20 PROCEDURE — 2580000003 HC RX 258: Performed by: STUDENT IN AN ORGANIZED HEALTH CARE EDUCATION/TRAINING PROGRAM

## 2020-12-20 PROCEDURE — 6370000000 HC RX 637 (ALT 250 FOR IP): Performed by: STUDENT IN AN ORGANIZED HEALTH CARE EDUCATION/TRAINING PROGRAM

## 2020-12-20 PROCEDURE — 85025 COMPLETE CBC W/AUTO DIFF WBC: CPT

## 2020-12-20 PROCEDURE — 6360000002 HC RX W HCPCS: Performed by: STUDENT IN AN ORGANIZED HEALTH CARE EDUCATION/TRAINING PROGRAM

## 2020-12-20 PROCEDURE — 80048 BASIC METABOLIC PNL TOTAL CA: CPT

## 2020-12-20 PROCEDURE — 2500000003 HC RX 250 WO HCPCS: Performed by: STUDENT IN AN ORGANIZED HEALTH CARE EDUCATION/TRAINING PROGRAM

## 2020-12-20 RX ORDER — OXYCODONE HYDROCHLORIDE 5 MG/1
5 TABLET ORAL EVERY 6 HOURS PRN
Status: DISCONTINUED | OUTPATIENT
Start: 2020-12-20 | End: 2020-12-21 | Stop reason: HOSPADM

## 2020-12-20 RX ORDER — MORPHINE SULFATE 2 MG/ML
2 INJECTION, SOLUTION INTRAMUSCULAR; INTRAVENOUS EVERY 4 HOURS PRN
Status: DISCONTINUED | OUTPATIENT
Start: 2020-12-20 | End: 2020-12-21

## 2020-12-20 RX ORDER — CYCLOBENZAPRINE HCL 10 MG
10 TABLET ORAL 2 TIMES DAILY PRN
Status: DISCONTINUED | OUTPATIENT
Start: 2020-12-20 | End: 2020-12-21 | Stop reason: HOSPADM

## 2020-12-20 RX ADMIN — ACETAMINOPHEN 1000 MG: 500 TABLET ORAL at 22:58

## 2020-12-20 RX ADMIN — SODIUM CHLORIDE, PRESERVATIVE FREE 10 ML: 5 INJECTION INTRAVENOUS at 20:52

## 2020-12-20 RX ADMIN — DULOXETINE 20 MG: 20 CAPSULE, DELAYED RELEASE ORAL at 08:18

## 2020-12-20 RX ADMIN — MORPHINE SULFATE 2 MG: 2 INJECTION, SOLUTION INTRAMUSCULAR; INTRAVENOUS at 16:47

## 2020-12-20 RX ADMIN — NALOXEGOL OXALATE 25 MG: 12.5 TABLET, FILM COATED ORAL at 08:18

## 2020-12-20 RX ADMIN — OXYCODONE HYDROCHLORIDE 5 MG: 5 TABLET ORAL at 09:30

## 2020-12-20 RX ADMIN — MORPHINE SULFATE 4 MG: 4 INJECTION INTRAVENOUS at 06:19

## 2020-12-20 RX ADMIN — PRAVASTATIN SODIUM 20 MG: 20 TABLET ORAL at 08:18

## 2020-12-20 RX ADMIN — FAMOTIDINE 20 MG: 10 INJECTION INTRAVENOUS at 09:30

## 2020-12-20 RX ADMIN — METOPROLOL TARTRATE 50 MG: 50 TABLET, FILM COATED ORAL at 20:58

## 2020-12-20 RX ADMIN — ACETAMINOPHEN 1000 MG: 500 TABLET ORAL at 13:37

## 2020-12-20 RX ADMIN — APIXABAN 5 MG: 5 TABLET, FILM COATED ORAL at 09:30

## 2020-12-20 RX ADMIN — ONDANSETRON 4 MG: 2 INJECTION INTRAMUSCULAR; INTRAVENOUS at 09:36

## 2020-12-20 RX ADMIN — METOPROLOL TARTRATE 50 MG: 50 TABLET, FILM COATED ORAL at 08:18

## 2020-12-20 RX ADMIN — POTASSIUM CHLORIDE, DEXTROSE MONOHYDRATE AND SODIUM CHLORIDE: 150; 5; 450 INJECTION, SOLUTION INTRAVENOUS at 00:57

## 2020-12-20 RX ADMIN — OXYCODONE HYDROCHLORIDE 5 MG: 5 TABLET ORAL at 23:00

## 2020-12-20 RX ADMIN — OXYCODONE HYDROCHLORIDE 5 MG: 5 TABLET ORAL at 03:10

## 2020-12-20 RX ADMIN — MORPHINE SULFATE 4 MG: 4 INJECTION INTRAVENOUS at 00:57

## 2020-12-20 RX ADMIN — SODIUM CHLORIDE, PRESERVATIVE FREE 10 ML: 5 INJECTION INTRAVENOUS at 10:21

## 2020-12-20 RX ADMIN — APIXABAN 5 MG: 5 TABLET, FILM COATED ORAL at 20:58

## 2020-12-20 RX ADMIN — MORPHINE SULFATE 2 MG: 2 INJECTION, SOLUTION INTRAMUSCULAR; INTRAVENOUS at 20:51

## 2020-12-20 RX ADMIN — FAMOTIDINE 20 MG: 10 INJECTION INTRAVENOUS at 20:51

## 2020-12-20 RX ADMIN — ACETAMINOPHEN 1000 MG: 500 TABLET ORAL at 06:19

## 2020-12-20 RX ADMIN — MORPHINE SULFATE 2 MG: 2 INJECTION, SOLUTION INTRAMUSCULAR; INTRAVENOUS at 11:33

## 2020-12-20 RX ADMIN — HEPARIN SODIUM 5000 UNITS: 5000 INJECTION INTRAVENOUS; SUBCUTANEOUS at 06:19

## 2020-12-20 RX ADMIN — OXYCODONE HYDROCHLORIDE 5 MG: 5 TABLET ORAL at 15:18

## 2020-12-20 RX ADMIN — MORPHINE SULFATE 4 MG: 4 INJECTION INTRAVENOUS at 04:16

## 2020-12-20 RX ADMIN — ALLOPURINOL 100 MG: 100 TABLET ORAL at 08:18

## 2020-12-20 ASSESSMENT — PAIN DESCRIPTION - LOCATION
LOCATION: ABDOMEN

## 2020-12-20 ASSESSMENT — PAIN SCALES - GENERAL
PAINLEVEL_OUTOF10: 7
PAINLEVEL_OUTOF10: 7
PAINLEVEL_OUTOF10: 8
PAINLEVEL_OUTOF10: 7
PAINLEVEL_OUTOF10: 8
PAINLEVEL_OUTOF10: 7
PAINLEVEL_OUTOF10: 7
PAINLEVEL_OUTOF10: 8
PAINLEVEL_OUTOF10: 7
PAINLEVEL_OUTOF10: 8
PAINLEVEL_OUTOF10: 7
PAINLEVEL_OUTOF10: 7
PAINLEVEL_OUTOF10: 8

## 2020-12-20 ASSESSMENT — PAIN DESCRIPTION - ORIENTATION: ORIENTATION: RIGHT;LOWER;MID

## 2020-12-20 ASSESSMENT — PAIN DESCRIPTION - PAIN TYPE
TYPE: SURGICAL PAIN

## 2020-12-20 ASSESSMENT — PAIN DESCRIPTION - PROGRESSION: CLINICAL_PROGRESSION: NOT CHANGED

## 2020-12-20 ASSESSMENT — PAIN DESCRIPTION - DESCRIPTORS: DESCRIPTORS: ACHING;CONSTANT;DISCOMFORT

## 2020-12-20 ASSESSMENT — PAIN DESCRIPTION - FREQUENCY
FREQUENCY: CONTINUOUS
FREQUENCY: CONTINUOUS

## 2020-12-20 ASSESSMENT — PAIN - FUNCTIONAL ASSESSMENT: PAIN_FUNCTIONAL_ASSESSMENT: ACTIVITIES ARE NOT PREVENTED

## 2020-12-20 ASSESSMENT — PAIN DESCRIPTION - ONSET: ONSET: ON-GOING

## 2020-12-20 NOTE — TELEPHONE ENCOUNTER
From: Daniel Painting II  To: Lior Albert APRN - CNP  Sent: 12/18/2020 9:56 PM EST  Subject: Prescription Question    Hello, I am in the hospital today and have had the colostomy reversed. I have not had my maintenance meds. Can you please order them so the hospital will give them to me.  Thank you

## 2020-12-20 NOTE — PLAN OF CARE
Problem: Pain:  Goal: Pain level will decrease  Description: Pain level will decrease  12/20/2020 1417 by Dinah Ahn RN  Outcome: Ongoing  12/20/2020 0414 by Letha Olmstead RN  Outcome: Ongoing  Goal: Control of acute pain  Description: Control of acute pain  12/20/2020 1417 by Dinah Ahn RN  Outcome: Ongoing  12/20/2020 0414 by Letha Olmstead RN  Outcome: Ongoing  Goal: Control of chronic pain  Description: Control of chronic pain  Outcome: Ongoing     Problem: Falls - Risk of:  Goal: Will remain free from falls  Description: Will remain free from falls  12/20/2020 1417 by Dinah Ahn RN  Outcome: Ongoing  12/20/2020 0414 by Letha Olmstead RN  Outcome: Ongoing  Goal: Absence of physical injury  Description: Absence of physical injury  12/20/2020 1417 by Dinah Ahn RN  Outcome: Ongoing  12/20/2020 0414 by Letha Olmstead RN  Outcome: Ongoing

## 2020-12-20 NOTE — PROGRESS NOTES
General Surgery:  Daily Progress Note                  PATIENT NAME: Renee Worthy II     TODAY'S DATE: 12/20/2020, 7:02 AM  CC:  Elevated heart rate    SUBJECTIVE:     Pt seen and examined at bedside. Ambulating, + flatus. No acute issues overnight. HR better controlled. Denies nausea, vomiting. Requesting to go home. OBJECTIVE:   VITALS:  /82   Pulse 102   Temp 98.1 °F (36.7 °C)   Resp 18   Ht 5' 11\" (1.803 m)   Wt 214 lb 11.7 oz (97.4 kg)   SpO2 99%   BMI 29.95 kg/m²      INTAKE/OUTPUT:      Intake/Output Summary (Last 24 hours) at 12/20/2020 4046  Last data filed at 12/20/2020 6066  Gross per 24 hour   Intake 2625 ml   Output 1850 ml   Net 775 ml       PHYSICAL EXAM:  General Appearance: awake, alert, oriented, in no acute distress  HEENT:  Normocephalic, atraumatic, mucus membranes moist   Heart: regular rate, regular rhythm  Lungs: no respiratory distress  Abdomen: Soft, non distended, tender to palpation around incision sites -- appropriate post operative pain. Incisions clean, dry and intact  Extremities: No cyanosis, pitting edema, rashes noted. Skin: Skin color, texture, turgor normal. No rashes or lesions.       Data:  CBC with Differential:    Lab Results   Component Value Date    WBC 10.2 12/20/2020    RBC 4.30 12/20/2020    HGB 12.3 12/20/2020    HCT 40.5 12/20/2020     12/20/2020    MCV 94.2 12/20/2020    MCH 28.6 12/20/2020    MCHC 30.4 12/20/2020    RDW 15.6 12/20/2020    LYMPHOPCT 9 12/20/2020    MONOPCT 8 12/20/2020    BASOPCT 0 12/20/2020    MONOSABS 0.86 12/20/2020    LYMPHSABS 0.96 12/20/2020    EOSABS 0.24 12/20/2020    BASOSABS <0.03 12/20/2020    DIFFTYPE NOT REPORTED 12/20/2020     BMP:    Lab Results   Component Value Date     12/20/2020    K 4.7 12/20/2020     12/20/2020    CO2 19 12/20/2020    BUN 25 12/20/2020    LABALBU 4.1 11/10/2020    CREATININE 1.28 12/20/2020    CALCIUM 9.4 12/20/2020    GFRAA >60 12/20/2020    LABGLOM 57 12/20/2020 GLUCOSE 122 12/20/2020       Radiology Review:      ASSESSMENT:  Active Hospital Problems    Diagnosis Date Noted    Colostomy in place [facility]) [Z93.3] 12/18/2020       56 y.o. male colostomy reversal    12/18: Ex lap with loop colostomy reversal    Plan:  1. Diet: advance to FLD  2. Pain: Morphine, roxicodone, tylenol  3. D/c IVF  4. Encourage ambulation, deep breathing, coughing  5. Heparin TID  6. Cards: no further work up or changes to medications  7.  Discharge planning next 24-48 hours      Electronically signed by David Ziegler DO  on 12/20/2020 at 7:02 AM

## 2020-12-20 NOTE — PLAN OF CARE
Problem: Pain:  Goal: Pain level will decrease  Description: Pain level will decrease  12/20/2020 0414 by Cayetano Meek RN  Outcome: Ongoing  12/19/2020 1603 by Charlott Sicard, RN  Outcome: Ongoing  Goal: Control of acute pain  Description: Control of acute pain  12/20/2020 0414 by Cayetano Meek RN  Outcome: Ongoing  12/19/2020 1603 by Charlott Sicard, RN  Outcome: Ongoing  Goal: Control of chronic pain  Description: Control of chronic pain  12/19/2020 1603 by Charlott Sicard, RN  Outcome: Ongoing     Problem: Falls - Risk of:  Goal: Will remain free from falls  Description: Will remain free from falls  12/20/2020 0414 by Cayetano Meek RN  Outcome: Ongoing  12/19/2020 1603 by Charlott Sicard, RN  Outcome: Ongoing  Goal: Absence of physical injury  Description: Absence of physical injury  12/20/2020 0414 by Cayetano Meek RN  Outcome: Ongoing  12/19/2020 1603 by Charlott Sicard, RN  Outcome: Ongoing

## 2020-12-21 VITALS
HEIGHT: 71 IN | DIASTOLIC BLOOD PRESSURE: 83 MMHG | WEIGHT: 214.73 LBS | HEART RATE: 108 BPM | TEMPERATURE: 98.4 F | SYSTOLIC BLOOD PRESSURE: 108 MMHG | BODY MASS INDEX: 30.06 KG/M2 | OXYGEN SATURATION: 97 % | RESPIRATION RATE: 17 BRPM

## 2020-12-21 LAB
ABSOLUTE EOS #: 0.49 K/UL (ref 0–0.44)
ABSOLUTE IMMATURE GRANULOCYTE: 0.08 K/UL (ref 0–0.3)
ABSOLUTE LYMPH #: 1.65 K/UL (ref 1.1–3.7)
ABSOLUTE MONO #: 1.06 K/UL (ref 0.1–1.2)
ANION GAP SERPL CALCULATED.3IONS-SCNC: 9 MMOL/L (ref 9–17)
BASOPHILS # BLD: 0 % (ref 0–2)
BASOPHILS ABSOLUTE: 0.04 K/UL (ref 0–0.2)
BUN BLDV-MCNC: 21 MG/DL (ref 8–23)
BUN/CREAT BLD: ABNORMAL (ref 9–20)
CALCIUM SERPL-MCNC: 9.9 MG/DL (ref 8.6–10.4)
CHLORIDE BLD-SCNC: 107 MMOL/L (ref 98–107)
CO2: 19 MMOL/L (ref 20–31)
CREAT SERPL-MCNC: 1.29 MG/DL (ref 0.7–1.2)
DIFFERENTIAL TYPE: ABNORMAL
EOSINOPHILS RELATIVE PERCENT: 4 % (ref 1–4)
GFR AFRICAN AMERICAN: >60 ML/MIN
GFR NON-AFRICAN AMERICAN: 56 ML/MIN
GFR SERPL CREATININE-BSD FRML MDRD: ABNORMAL ML/MIN/{1.73_M2}
GFR SERPL CREATININE-BSD FRML MDRD: ABNORMAL ML/MIN/{1.73_M2}
GLUCOSE BLD-MCNC: 86 MG/DL (ref 75–110)
GLUCOSE BLD-MCNC: 96 MG/DL (ref 70–99)
GLUCOSE BLD-MCNC: 96 MG/DL (ref 75–110)
HCT VFR BLD CALC: 41.6 % (ref 40.7–50.3)
HEMOGLOBIN: 12.9 G/DL (ref 13–17)
IMMATURE GRANULOCYTES: 1 %
LYMPHOCYTES # BLD: 13 % (ref 24–43)
MCH RBC QN AUTO: 29.1 PG (ref 25.2–33.5)
MCHC RBC AUTO-ENTMCNC: 31 G/DL (ref 28.4–34.8)
MCV RBC AUTO: 93.7 FL (ref 82.6–102.9)
MONOCYTES # BLD: 9 % (ref 3–12)
NRBC AUTOMATED: 0 PER 100 WBC
PDW BLD-RTO: 15.6 % (ref 11.8–14.4)
PLATELET # BLD: 224 K/UL (ref 138–453)
PLATELET ESTIMATE: ABNORMAL
PMV BLD AUTO: 10.8 FL (ref 8.1–13.5)
POTASSIUM SERPL-SCNC: 4.2 MMOL/L (ref 3.7–5.3)
RBC # BLD: 4.44 M/UL (ref 4.21–5.77)
RBC # BLD: ABNORMAL 10*6/UL
SEG NEUTROPHILS: 73 % (ref 36–65)
SEGMENTED NEUTROPHILS ABSOLUTE COUNT: 9.08 K/UL (ref 1.5–8.1)
SODIUM BLD-SCNC: 135 MMOL/L (ref 135–144)
SURGICAL PATHOLOGY REPORT: NORMAL
WBC # BLD: 12.4 K/UL (ref 3.5–11.3)
WBC # BLD: ABNORMAL 10*3/UL

## 2020-12-21 PROCEDURE — 2580000003 HC RX 258: Performed by: STUDENT IN AN ORGANIZED HEALTH CARE EDUCATION/TRAINING PROGRAM

## 2020-12-21 PROCEDURE — 6370000000 HC RX 637 (ALT 250 FOR IP): Performed by: STUDENT IN AN ORGANIZED HEALTH CARE EDUCATION/TRAINING PROGRAM

## 2020-12-21 PROCEDURE — 82947 ASSAY GLUCOSE BLOOD QUANT: CPT

## 2020-12-21 PROCEDURE — 36415 COLL VENOUS BLD VENIPUNCTURE: CPT

## 2020-12-21 PROCEDURE — 80048 BASIC METABOLIC PNL TOTAL CA: CPT

## 2020-12-21 PROCEDURE — 6360000002 HC RX W HCPCS: Performed by: STUDENT IN AN ORGANIZED HEALTH CARE EDUCATION/TRAINING PROGRAM

## 2020-12-21 PROCEDURE — 85025 COMPLETE CBC W/AUTO DIFF WBC: CPT

## 2020-12-21 RX ORDER — GLUCOSAMINE HCL/CHONDROITIN SU 500-400 MG
CAPSULE ORAL
Qty: 120 STRIP | Refills: 0 | Status: SHIPPED | OUTPATIENT
Start: 2020-12-21 | End: 2021-01-25

## 2020-12-21 RX ORDER — OXYCODONE HYDROCHLORIDE 5 MG/1
5 TABLET ORAL EVERY 6 HOURS PRN
Qty: 28 TABLET | Refills: 0 | Status: SHIPPED | OUTPATIENT
Start: 2020-12-21 | End: 2020-12-28

## 2020-12-21 RX ADMIN — APIXABAN 5 MG: 5 TABLET, FILM COATED ORAL at 09:42

## 2020-12-21 RX ADMIN — OXYCODONE HYDROCHLORIDE 5 MG: 5 TABLET ORAL at 11:57

## 2020-12-21 RX ADMIN — Medication 10 ML: at 04:11

## 2020-12-21 RX ADMIN — ALLOPURINOL 100 MG: 100 TABLET ORAL at 09:42

## 2020-12-21 RX ADMIN — PRAVASTATIN SODIUM 20 MG: 20 TABLET ORAL at 09:42

## 2020-12-21 RX ADMIN — OXYCODONE HYDROCHLORIDE 5 MG: 5 TABLET ORAL at 05:46

## 2020-12-21 RX ADMIN — ACETAMINOPHEN 1000 MG: 500 TABLET ORAL at 15:43

## 2020-12-21 RX ADMIN — ACETAMINOPHEN 1000 MG: 500 TABLET ORAL at 05:45

## 2020-12-21 RX ADMIN — MORPHINE SULFATE 2 MG: 2 INJECTION, SOLUTION INTRAMUSCULAR; INTRAVENOUS at 04:11

## 2020-12-21 RX ADMIN — DULOXETINE 20 MG: 20 CAPSULE, DELAYED RELEASE ORAL at 09:42

## 2020-12-21 RX ADMIN — METOPROLOL TARTRATE 50 MG: 50 TABLET, FILM COATED ORAL at 09:42

## 2020-12-21 RX ADMIN — SODIUM CHLORIDE, PRESERVATIVE FREE 10 ML: 5 INJECTION INTRAVENOUS at 09:42

## 2020-12-21 ASSESSMENT — PAIN SCALES - GENERAL
PAINLEVEL_OUTOF10: 8
PAINLEVEL_OUTOF10: 7

## 2020-12-21 NOTE — PROGRESS NOTES
Diagnosis Date Noted    Colostomy in place [facility]) [Z93.3] 12/18/2020       56 y.o. male colostomy reversal    12/18: Ex lap with loop colostomy reversal    Plan:  1. Diet: advance to low fiber diet  2. Pain:  roxicodone, tylenol  3. Encourage ambulation, deep breathing, coughing  4. Restarted Eliquis yesterday  5. Cards: no further work up or changes to medications  6.  Discharge planning next 24 hours      Electronically signed by Bre Sharma DO  on 12/21/2020 at 6:44 AM

## 2020-12-21 NOTE — DISCHARGE INSTR - COC
Continuity of Care Form    Patient Name: Rwandan Eye II   :  1960  MRN:  [id number]    Admit date:  2020  Discharge date:  2020    Code Status Order: Full Code   Advance Directives:   Advance Care Flowsheet Documentation       Date/Time Healthcare Directive Type of Healthcare Directive Copy in [address] Agent's Name Healthcare Agent's Phone Number    20 1621  No, patient does not have an advance directive for healthcare treatment -- -- -- -- --    20 1444  No, patient does not have an advance directive for healthcare treatment -- -- -- -- --            Admitting Physician:  Brian Leach DO  PCP: Noreen Gottron, APRN - CNP    Discharging Nurse: [facility] Unit/Room#: 5635/5503-46  Discharging Unit Phone Number: [phone]      Emergency Contact:   Extended Emergency Contact Information  Primary Emergency Contact: Roseline Baker  Address: [address] [place] [address]  Home Phone: [phone]  Work Phone: [phone]  Mobile Phone: [phone]  Relation: Spouse    Past Surgical History:  Past Surgical History:   Procedure Laterality Date    ADENOIDECTOMY      TWICE AS A CHILD    ANKLE SURGERY Right     RUPTURED ACHILES    CARPAL TUNNEL RELEASE Bilateral     COLOSTOMY      temporary    CYSTO/URETERO/PYELOSCOPY, CALCULUS TX Right 10/30/2020    HOLMIUM-STANDBY, CYSTOSCOPY, URETEROSCOPY, STENT EXCHANGE performed by Shaina Barroso MD at [facility] Right 2020    CYSTOSCOPY RIGHT URETERAL STENT INSERTION performed by Gabriel Parekh MD at [facility] CATH POWER PICC TRIPLE  2020    REMOVED AROUND 10/07/2020    KIDNEY SURGERY Left 2020    LAPAROSCOPIC XI ROBOTIC NEPHRECTOMY performed by Charlie Desouza MD at [address] [place] [address] ARTHROSCOPY Bilateral     KNEE SURGERY Bilateral     LAPAROSCOPY  2020    EXPLORATORY LAPAROTOMY,COLOSTOMY REVERSAL    LAPAROTOMY N/A 2020 LAPAROTOMY EXPLORATORY performed by Gabriella Koehler DO at [address] [place] [address] N/A 09/02/2020    EXPLORATORY LAPAROTOMY, RESECTION OF PROXIMAL JEJUNUM AND DESCENDING COLON WITH MOBILIZATION OF SPLENIC FLEXURE AND PRIMARY ANASTAMOSISOF SMALL BOWEL AND COLON, PLACEMENT OF GASTROSTOMY TUBE performed by Gabriella Koehler DO at [address] [place] [address] N/A 12/18/2020    EXPLORATORY LAPAROTOMY,COLOSTOMY REVERSAL, TAP BLOCK DONE IN OR BY DR Mary Jo Lewis performed by Gabriella Koehler DO at [address] [place] [address] 3 TIMES  AS A CHILD       Immunization History:   Immunization History   Administered Date(s) Administered    Pneumococcal Polysaccharide ([id number]) 12/12/2016       Active Problems:  Patient Active Problem List   Diagnosis Code    Cellulitis L03.90    Type 2 diabetes mellitus without complication ([address] J89.0    Essential hypertension I10    Morbid obesity due to excess calories ([address] E66.01    Chronic kidney disease (CKD) N18.9    DEBBI (acute kidney injury) ([address] N17.9    Bowel obstruction ([address] K56.609    Small bowel obstruction ([address] K56.609    Moderate malnutrition ([address] E44.0    Hyperglycemia R73.9    Renal cell cancer ([address] C64.9    Sleep apnea G47.30    Colostomy in place [facility]) Z93.3       Isolation/Infection:   Isolation            No Isolation          Patient Infection Status       Infection Onset Added Last Indicated Last Indicated By Review Planned Expiration Resolved Resolved By    None active    Resolved    COVID-19 Rule Out 12/14/20 12/14/20 12/14/20 COVID-19 (Ordered)   12/15/20 Rule-Out Test Resulted    COVID-19 Rule Out 10/26/20 10/26/20 10/26/20 Covid-19 Ambulatory (Ordered)   10/27/20 Rule-Out Test Resulted    COVID-19 Rule Out 09/22/20 09/22/20 09/22/20 COVID-19 (Ordered)   09/22/20 Rule-Out Test Resulted    COVID-19 Rule Out 09/01/20 09/01/20 09/01/20 COVID-19 (Ordered)   09/01/20 Rule-Out Test Resulted COVID-19 Rule Out 06/10/20 06/10/20 06/10/20 COVID-19 Ambulatory (Ordered)   06/12/20 Rule-Out Test Resulted            Nurse Assessment:  Last Vital Signs: /83   Pulse 108   Temp 98.4 °F (36.9 °C) (Oral)   Resp 17   Ht 5' 11\" (1.803 m)   Wt 214 lb 11.7 oz (97.4 kg)   SpO2 97%   BMI 29.95 kg/m²     Last documented pain score (0-10 scale): Pain Level: 8  Last Weight:   Wt Readings from Last 1 Encounters:   12/18/20 214 lb 11.7 oz (97.4 kg)     Mental Status:  oriented and alert    IV Access:  - None    Nursing Mobility/ADLs:  Walking   Independent  Transfer  Independent  Bathing  Independent  Dressing  Independent  Toileting  Independent  Feeding  Independent  Med Admin  Independent  Med Delivery   whole    Wound Care Documentation and Therapy:        Elimination:  Continence:   · Bowel: Yes  · Bladder: Yes  Urinary Catheter: None   Colostomy/Ileostomy/Ileal Conduit: No       Date of Last BM: 12/20/2020  No intake or output data in the 24 hours ending 12/21/20 1133  No intake/output data recorded. Safety Concerns:     None    Impairments/Disabilities:      None    Nutrition Therapy:  Current Nutrition Therapy:   - Oral Diet:  Low Fiber    Routes of Feeding: Oral  Liquids: No Restrictions  Daily Fluid Restriction: no  Last Modified Barium Swallow with Video (Video Swallowing Test): not done    Treatments at the Time of Hospital Discharge:   Respiratory Treatments: ***  Oxygen Therapy:  is not on home oxygen therapy.   Ventilator:    - No ventilator support    Rehab Therapies: {THERAPEUTIC INTERVENTION:[id number]}  Weight Bearing Status/Restrictions: No weight bearing restirctions  Other Medical Equipment (for information only, NOT a DME order):  {EQUIPMENT:[id number]}  Other Treatments: ***    Patient's personal belongings (please select all that are sent with patient):  patient belongings bag    RN SIGNATURE:  Electronically signed by Sunita Pradhan RN on 12/21/20 at 1:46 PM EST CASE MANAGEMENT/SOCIAL WORK SECTION    Inpatient Status Date: 12/18/2020    Readmission Risk Assessment Score:  Readmission Risk              Risk of Unplanned Readmission:        31           Discharging to Facility/ Agency   Name: [facility]  [facility] Details  FAX            [address] [facility],  [place]  [address]       Phone: [phone]        ·   · Address:  · Phone:  · Fax:    Dialysis Facility (if applicable)   · Name:  · Address:  · Dialysis Schedule:  · Phone:  · Fax:    / signature: Electronically signed by Manju Guerrero RN on 12/21/20 at 12:27 PM EST    PHYSICIAN SECTION    Prognosis: Good    Condition at Discharge: Stable    Rehab Potential (if transferring to Rehab): Good    Recommended Labs or Other Treatments After Discharge: none    Physician Certification: I certify the above information and transfer of Verónica Parker II  is necessary for the continuing treatment of the diagnosis listed and that he requires [address] for less 30 days.      Update Admission H&P: No change in H&P    PHYSICIAN SIGNATURE:  Electronically signed by Blayne Lilly DO on 12/21/2020 at 11:37 AM

## 2020-12-21 NOTE — CARE COORDINATION
Lukas Rojas needs to be seen for an appointment before further refills are given.  
Transitional Planning    Called [facility] [phone] and spoke with Rika Allen. Patient is current with them. They are following him and will see him after discharge. 1130 PS to primary requesting home care order and glucometer strip script. Home care order completed and CRISTOFER signed by doctor. Mitchell Melo called and notified of discharge. Spoke with Ganesh Bran and she will pull discharge information from [address] [place] [address] Rd.     Discharge [facility] Case Management Department  Written by: Shruthi Buck RN    Patient Name: Rm Hammer II  Attending Provider: Katie Sage DO  Admit Date: 2020  5:53 AM  MRN: [id number]  Account: [de-identified]                     : 1960  Discharge Date:       Disposition: home, with Mary Quiroz RN
Discharge Plan: Patient had reversal of colostomy, current with [facility], Referral sent, will need order for glucometer strips at discharge, Goal is home with [facility], will need 1 week f/u          Electronically signed by Milton Mcconnell RN on 12/20/20 at 12:46 PM EST

## 2020-12-21 NOTE — PROGRESS NOTES
Patient discharged with spouse and all belongings. Discharge paperwork provided and discussed. All questions answered. One IV removed with no complications and catheter intact. One medication provided to patient via meds to beds. Patient left unit via wheelchair.

## 2020-12-21 NOTE — PROGRESS NOTES
CLINICAL PHARMACY NOTE: MEDS TO [address] Select Patient?: No  Total # of Prescriptions Filled: 1   The following medications were delivered to the patient:  · Oxycodone   Total # of Interventions Completed: 0  Time Spent (min): 0    Additional Documentation:

## 2020-12-24 NOTE — DISCHARGE SUMMARY
Surgery Discharge Summary     Patient Identification  Nishant Stein II is a 56 y.o. male. :  1960  Admit Date:  2020    Discharge date:   2020  4:07 PM                                   Disposition: home    Discharge Diagnoses:   History of diverticulitis    Condition on discharge: Good    Consults: Cardiology    Surgery: Exploratory laparotomy, loop colostomy reversal.    Patient Instructions: Activity: no heavy lifting, pushing, pulling for 6 weeks, no driving for 2 weeks or while on analgesics  Diet: As tolerated  Follow-up with Dr. Angelina Caraballo in 2 weeks. See pre-printed instructions in chart and given to patient upon discharge. Discharge Medications:      Vickie Zapata \"Andrew\"   Home Medication Instructions OMD:[id number]    Printed on:20 1002   Medication Information                      acetaminophen (TYLENOL) 500 MG tablet  Take 2 tablets by mouth every 6 hours as needed for Pain             allopurinol (ZYLOPRIM) 100 MG tablet  Take 100 mg by mouth daily             apixaban (ELIQUIS) 5 MG TABS tablet  Take 1 tablet by mouth 2 times daily             aspirin 81 MG tablet  Take 81 mg by mouth nightly Stopped 2020             B-D UF III MINI PEN NEEDLES 31G X 5 MM MISC               blood glucose monitor strips  Test 4 times a day & as needed for symptoms of irregular blood glucose. Dispense sufficient amount for indicated testing frequency plus additional to accommodate PRN testing needs.              CPAP Machine MISC  use as directed             cyclobenzaprine (FLEXERIL) 10 MG tablet  Take 1 tablet by mouth 3 times daily as needed for Muscle spasms             DULoxetine (CYMBALTA) 20 MG extended release capsule  TAKE 1 CAPSULE BY MOUTH EVERY DAY             ferrous sulfate (IRON 325) 325 (65 Fe) MG tablet  Take 1 tablet by mouth daily (with breakfast)             hydrALAZINE (APRESOLINE) 25 MG tablet  Take 25 mg by mouth 2 times daily Melatonin 10 MG TABS  Take 1 tablet by mouth nightly as needed             metoprolol (LOPRESSOR) 50 MG tablet  Take 50 mg by mouth 2 times daily             Multiple Vitamins-Minerals (THERAPEUTIC MULTIVITAMIN-MINERALS) tablet  Take 1 tablet by mouth daily             NOVOLOG FLEXPEN 100 UNIT/ML injection pen  Inject into the skin 3 times daily (before meals) SLIDING SCALE: BLOOD SUGAR =NO INSULIN.   140-199=1 UNIT,  200-249=2 UNITS,  250-299=3 UNITS,  300-349= 4 UNITS,  350-399=5 UNITS,  >399= 6 UNITS             oxyCODONE (ROXICODONE) 5 MG immediate release tablet  Take 1 tablet by mouth every 6 hours as needed for Pain for up to 7 days. pravastatin (PRAVACHOL) 20 MG tablet  Take 20 mg by mouth daily             Respiratory Therapy Supplies (CARETOUCH CPAP & BIPAP HOSE) MISC  Mask and Tubing             vitamin C (ASCORBIC ACID) 500 MG tablet  Take 500 mg by mouth daily             zolpidem (AMBIEN) 10 MG tablet  Take by mouth nightly as needed for Sleep                  HPI and Hospital Course:   56 y.o. male presented on 12/18/2020 for exploratory laparotomy, loop colostomy reversal.  Patient was admitted postoperatively. Overnight, patient had an episode of tachycardia and cardiology was consulted and recommended restarting and continuing home Lopressor. Otherwise, Bryant was removed, patient was ambulating. He was passing flatus on postop day 2 and started on a regular diet. Patient was ready for discharge on postop day 4. Hospital course was unremarkable. On day of discharge pt was tolerating regular diet, pain controlled with oral medications and ambulating without difficulty.     He is to follow-up with Dr. Jovana Juarez, in 2 weeks      Electronically signed by Rebecca Novak DO on 12/24/2020 at 10:02 AM

## 2020-12-28 ENCOUNTER — OFFICE VISIT (OUTPATIENT)
Dept: FAMILY MEDICINE CLINIC | Age: 60
End: 2020-12-28
Payer: COMMERCIAL

## 2020-12-28 VITALS
BODY MASS INDEX: 30.13 KG/M2 | OXYGEN SATURATION: 96 % | HEART RATE: 118 BPM | HEIGHT: 71 IN | TEMPERATURE: 97.9 F | DIASTOLIC BLOOD PRESSURE: 67 MMHG | WEIGHT: 215.2 LBS | SYSTOLIC BLOOD PRESSURE: 108 MMHG

## 2020-12-28 PROBLEM — C64.9 RENAL CELL CANCER (HCC): Status: RESOLVED | Noted: 2020-09-10 | Resolved: 2020-12-28

## 2020-12-28 PROCEDURE — 1111F DSCHRG MED/CURRENT MED MERGE: CPT | Performed by: NURSE PRACTITIONER

## 2020-12-28 PROCEDURE — 99213 OFFICE O/P EST LOW 20 MIN: CPT | Performed by: NURSE PRACTITIONER

## 2020-12-28 RX ORDER — ZOLPIDEM TARTRATE 10 MG/1
10 TABLET ORAL NIGHTLY PRN
Qty: 90 TABLET | Refills: 0 | Status: SHIPPED | OUTPATIENT
Start: 2020-12-28 | End: 2021-03-28

## 2020-12-28 RX ORDER — FERROUS SULFATE 325(65) MG
325 TABLET ORAL
Qty: 180 TABLET | Refills: 1 | Status: SHIPPED | OUTPATIENT
Start: 2020-12-28

## 2020-12-28 RX ORDER — TRAMADOL HYDROCHLORIDE 50 MG/1
50 TABLET ORAL EVERY 6 HOURS PRN
COMMUNITY
End: 2021-01-22 | Stop reason: SDUPTHER

## 2020-12-28 RX ORDER — ALLOPURINOL 100 MG/1
100 TABLET ORAL DAILY
Qty: 90 TABLET | Refills: 1 | Status: SHIPPED | OUTPATIENT
Start: 2020-12-28 | End: 2021-06-25

## 2020-12-28 RX ORDER — CYCLOBENZAPRINE HCL 10 MG
10 TABLET ORAL 2 TIMES DAILY PRN
Qty: 180 TABLET | Refills: 1 | Status: SHIPPED | OUTPATIENT
Start: 2020-12-28 | End: 2021-06-25

## 2020-12-28 RX ORDER — DULOXETIN HYDROCHLORIDE 20 MG/1
CAPSULE, DELAYED RELEASE ORAL
Qty: 90 CAPSULE | Refills: 3 | Status: SHIPPED | OUTPATIENT
Start: 2020-12-28 | End: 2021-12-03

## 2020-12-28 RX ORDER — DILTIAZEM HYDROCHLORIDE 60 MG/1
60 TABLET, FILM COATED ORAL EVERY 12 HOURS
COMMUNITY
End: 2021-05-12 | Stop reason: SDUPTHER

## 2020-12-28 RX ORDER — HYDRALAZINE HYDROCHLORIDE 25 MG/1
25 TABLET, FILM COATED ORAL 3 TIMES DAILY
Qty: 270 TABLET | Refills: 1 | Status: ON HOLD | OUTPATIENT
Start: 2020-12-28 | End: 2022-01-05

## 2020-12-28 ASSESSMENT — ENCOUNTER SYMPTOMS
APNEA: 1
BLOOD IN STOOL: 0
CONSTIPATION: 0
DIARRHEA: 1
ABDOMINAL PAIN: 0
ANAL BLEEDING: 0
NAUSEA: 0
COUGH: 0
CHEST TIGHTNESS: 0
SHORTNESS OF BREATH: 0
VOMITING: 0

## 2020-12-28 NOTE — PROGRESS NOTES
Visit Information    Have you changed or started any medications since your last visit including any over-the-counter medicines, vitamins, or herbal medicines? no   Have you stopped taking any of your medications? Is so, why? -  no  Are you having any side effects from any of your medications? - no    Have you seen any other physician or provider since your last visit?  no   Have you had any other diagnostic tests since your last visit?  no   Have you been seen in the emergency room and/or had an admission in a hospital since we last saw you?  no   Have you had your routine dental cleaning in the past 6 months?  no     Do you have an active MyChart account? If no, what is the barrier?   Yes    Patient Care Team:  TORREY Bartlett CNP as PCP - General (Family Nurse Practitioner)  TORREY Bartlett CNP as PCP - [facility] Provider    Medical History Review  Past Medical, Family, and Social History reviewed and  contribute to the patient presenting condition    Health Maintenance   Topic Date Due    Diabetic foot exam  01/13/1968    Diabetic retinal exam  01/13/1968    DTaP/Tdap/Td vaccine (1 - Tdap) 01/13/1977    Shingles Vaccine (1 of 2) 01/13/2008    Colon cancer screen colonoscopy  01/13/2008    Flu vaccine (1) 09/01/2020    Diabetic microalbuminuria test  06/22/2021    Lipid screen  06/22/2021    A1C test (Diabetic or Prediabetic)  12/18/2021    Potassium monitoring  12/21/2021    Creatinine monitoring  12/21/2021    Pneumococcal 0-64 years Vaccine  Completed    Hepatitis C screen  Completed    HIV screen  Completed    Hepatitis A vaccine  Aged Out    Hib vaccine  Aged Out    Meningococcal (ACWY) vaccine  Aged Out

## 2020-12-28 NOTE — PROGRESS NOTES
[address]  [place], [address]  [phone]      Rm Hammer II is a 56 y.o. male who presents today for his  medicalconditions/complaints as noted below. Rm Hammer II is c/o of Follow-Up from Hospital (STV, s/p colostomy reversal,getting better)  . HPI:    HPI  Pt. Here for hospital f/u. He was admitted for 4 days for colostomy reversal. He did well for this procedure and also has been doing well since coming home. He states his heart rate was high during hospitalization and remains high now. He has no fever or chills. He does not have appt with cardio until February. He denies chest pain, sob or weakness. He is having bM rectally now, but loose and very gassy. No visible blood. He is eating and drinking normally and tolerating all meds well with no SE. He needs refills. Past Medical History:   Diagnosis Date    Arthritis     BILATERAL KNEES    Back pain     Colostomy RUQ 09/23/2020    Frequent headaches     10/28/2020 PATIENT STATES EVERY OTHER DAY.  Gout     History of atrial fibrillation     AFTER SURGERY ON 09/23/2020 WENT INTO A FIB. CONVERTED BACK TO NORMAL RHYTHM ON HIS OWN. CARDIOLOGIST DR Kalani Roberson History of blood transfusion     NO REACTION    Cahto (hard of hearing)     HTN     Dudley Rotunda, CNP    Hyperlipidemia     Sleep apnea     USES CPAP MACHINE    T2DM     K.  Neita Scale, CNP    Tooth missing     RIGHT UPPER 2ND MOLAR    Wears glasses     READING      Past Surgical History:   Procedure Laterality Date    ADENOIDECTOMY      TWICE AS A CHILD    ANKLE SURGERY Right 2010    RUPTURED ACHILES    CARPAL TUNNEL RELEASE Bilateral     COLOSTOMY      temporary    CYSTO/URETERO/PYELOSCOPY, CALCULUS TX Right 10/30/2020    HOLMIUM-STANDBY, CYSTOSCOPY, URETEROSCOPY, STENT EXCHANGE performed by Natalie Pinon MD at [facility] Right 09/01/2020 CYSTOSCOPY RIGHT URETERAL STENT INSERTION performed by Katherine Vázquez MD at [address] [place] [address] PICC TRIPLE  09/03/2020    REMOVED AROUND 10/07/2020    KIDNEY SURGERY Left 09/02/2020    LAPAROSCOPIC XI ROBOTIC NEPHRECTOMY performed by Gabriella Lundberg MD at [address] [facility] ARTHROSCOPY Bilateral     KNEE SURGERY Bilateral     LAPAROSCOPY  12/18/2020    EXPLORATORY LAPAROTOMY,COLOSTOMY REVERSAL    LAPAROTOMY N/A 09/23/2020    LAPAROTOMY EXPLORATORY performed by Milly Win DO at [facility] N/A 09/02/2020    EXPLORATORY LAPAROTOMY, RESECTION OF PROXIMAL JEJUNUM AND DESCENDING COLON WITH MOBILIZATION OF SPLENIC FLEXURE AND PRIMARY ANASTAMOSISOF SMALL BOWEL AND COLON, PLACEMENT OF GASTROSTOMY TUBE performed by Milly Win DO at [facility] N/A 12/18/2020    EXPLORATORY LAPAROTOMY,COLOSTOMY REVERSAL, TAP BLOCK DONE IN OR BY DR Caesar Good performed by Milly Win DO at [address] [place] [address] 3 TIMES  AS A CHILD     Family History   Problem Relation Age of Onset    Stroke Maternal Grandmother     Stroke Maternal Grandfather     Other Mother         AORTIC BYPASS LED TO KIDNEY FAILURE    Kidney Disease Mother     Diabetes Father      Social History     Tobacco Use    Smoking status: Never Smoker    Smokeless tobacco: Former User     Types: Chew   Substance Use Topics    Alcohol use: Not Currently     Alcohol/week: 0.0 standard drinks      Current Outpatient Medications   Medication Sig Dispense Refill    traMADol (ULTRAM) 50 MG tablet Take 50 mg by mouth every 6 hours as needed for Pain.       dilTIAZem (CARDIZEM) 60 MG tablet Take 60 mg by mouth every 12 hours      DULoxetine (CYMBALTA) 20 MG extended release capsule TAKE 1 CAPSULE BY MOUTH EVERY DAY 90 capsule 3    ferrous sulfate (IRON 325) 325 (65 Fe) MG tablet Take 1 tablet by mouth daily (with breakfast) 180 tablet 1  cyclobenzaprine (FLEXERIL) 10 MG tablet Take 1 tablet by mouth 2 times daily as needed for Muscle spasms 180 tablet 1    allopurinol (ZYLOPRIM) 100 MG tablet Take 1 tablet by mouth daily 90 tablet 1    hydrALAZINE (APRESOLINE) 25 MG tablet Take 1 tablet by mouth 3 times daily 270 tablet 1    zolpidem (AMBIEN) 10 MG tablet Take 1 tablet by mouth nightly as needed for Sleep for up to 90 days. 90 tablet 0    oxyCODONE (ROXICODONE) 5 MG immediate release tablet Take 1 tablet by mouth every 6 hours as needed for Pain for up to 7 days. 28 tablet 0    blood glucose monitor strips Test 4 times a day & as needed for symptoms of irregular blood glucose. Dispense sufficient amount for indicated testing frequency plus additional to accommodate PRN testing needs.  120 strip 0    apixaban (ELIQUIS) 5 MG TABS tablet Take 1 tablet by mouth 2 times daily (Patient taking differently: Take 5 mg by mouth 2 times daily Stopped 12/16/2020) 180 tablet 1    CPAP Machine MISC use as directed      B-D UF III MINI PEN NEEDLES 31G X 5 MM MISC       Respiratory Therapy Supplies (CARETOUCH CPAP & BIPAP HOSE) MISC Mask and Tubing      Multiple Vitamins-Minerals (THERAPEUTIC MULTIVITAMIN-MINERALS) tablet Take 1 tablet by mouth daily 90 tablet 1    vitamin C (ASCORBIC ACID) 500 MG tablet Take 500 mg by mouth daily      aspirin 81 MG tablet Take 81 mg by mouth nightly Stopped 12/14/2020      pravastatin (PRAVACHOL) 20 MG tablet Take 20 mg by mouth daily      metoprolol (LOPRESSOR) 50 MG tablet Take 50 mg by mouth 2 times daily      NOVOLOG FLEXPEN 100 UNIT/ML injection pen Inject into the skin 3 times daily (before meals) SLIDING SCALE: BLOOD SUGAR =NO INSULIN.   140-199=1 UNIT,  200-249=2 UNITS,  250-299=3 UNITS,  300-349= 4 UNITS,  350-399=5 UNITS,  >399= 6 UNITS      Melatonin 10 MG TABS Take 1 tablet by mouth nightly as needed  acetaminophen (TYLENOL) 500 MG tablet Take 2 tablets by mouth every 6 hours as needed for Pain 40 tablet 0     No current facility-administered medications for this visit. Allergies   Allergen Reactions    Ampicillin Swelling     Swelling of throat.  Pcn [Penicillins] Swelling     Throat swelling. Tolerated cefepime during 8/31/20 admission.  Sulfa Antibiotics      Other reaction(s): Unknown    Tape [Adhesive Tape] Other (See Comments)     CAUSES REDNESS. PAPER TAPE OK. Health Maintenance   Topic Date Due    Diabetic foot exam  01/13/1968    Diabetic retinal exam  01/13/1968    DTaP/Tdap/Td vaccine (1 - Tdap) 01/13/1977    Shingles Vaccine (1 of 2) 01/13/2008    Colon cancer screen colonoscopy  01/13/2008    Flu vaccine (1) 12/28/2021 (Originally 9/1/2020)    Diabetic microalbuminuria test  06/22/2021    Lipid screen  06/22/2021    A1C test (Diabetic or Prediabetic)  12/18/2021    Potassium monitoring  12/21/2021    Creatinine monitoring  12/21/2021    Pneumococcal 0-64 years Vaccine  Completed    Hepatitis C screen  Completed    HIV screen  Completed    Hepatitis A vaccine  Aged Out    Hib vaccine  Aged Out    Meningococcal (ACWY) vaccine  Aged Out       Subjective:      Review of Systems   Constitutional: Negative for appetite change, chills, diaphoresis, fatigue and fever. Eyes: Negative for visual disturbance. Respiratory: Positive for apnea (has not been using machine since weight loss this past year and settings too high, sees sleep medicine next  month). Negative for cough, chest tightness and shortness of breath. Cardiovascular: Negative for chest pain, palpitations and leg swelling. Gastrointestinal: Positive for diarrhea. Negative for abdominal pain, anal bleeding, blood in stool, constipation, nausea and vomiting. Skin: Positive for wound (has appt with surgeon in 2 days). Negative for rash. Neurological: Negative for dizziness and headaches. Plan:      Return if symptoms worsen or fail to improve. Orders Placed This Encounter   Procedures    MA DISCHARGE MEDS RECONCILED W/ CURRENT OUTPATIENT MED LIST     Orders Placed This Encounter   Medications    DULoxetine (CYMBALTA) 20 MG extended release capsule     Sig: TAKE 1 CAPSULE BY MOUTH EVERY DAY     Dispense:  90 capsule     Refill:  3    ferrous sulfate (IRON 325) 325 (65 Fe) MG tablet     Sig: Take 1 tablet by mouth daily (with breakfast)     Dispense:  180 tablet     Refill:  1    cyclobenzaprine (FLEXERIL) 10 MG tablet     Sig: Take 1 tablet by mouth 2 times daily as needed for Muscle spasms     Dispense:  180 tablet     Refill:  1    allopurinol (ZYLOPRIM) 100 MG tablet     Sig: Take 1 tablet by mouth daily     Dispense:  90 tablet     Refill:  1    hydrALAZINE (APRESOLINE) 25 MG tablet     Sig: Take 1 tablet by mouth 3 times daily     Dispense:  270 tablet     Refill:  1    zolpidem (AMBIEN) 10 MG tablet     Sig: Take 1 tablet by mouth nightly as needed for Sleep for up to 90 days. Dispense:  90 tablet     Refill:  0   refills given  Doing well overall  Advised to call cardio today for discussion about tachycardia and or need for sooner appt, no sign of infection today  Continue with plan to see surgeon this week for f/u      Patient given educational materials - see patient instructions. Discussed use,benefit, and side effects of prescribed medications. All patient questions answered. Pt voiced understanding. Reviewed health maintenance. Instructed to continue currentmedications, diet and exercise.     Electronically signed by Shante Slaughter CNP on 12/28/2020 at 1:48 PM

## 2021-01-07 ENCOUNTER — PATIENT MESSAGE (OUTPATIENT)
Dept: FAMILY MEDICINE CLINIC | Age: 61
End: 2021-01-07

## 2021-01-07 NOTE — TELEPHONE ENCOUNTER
From: Zacarias Castelan II  To: Cristopher Schlatter, APRN - CNP  Sent: 1/7/2021 9:32 AM EST  Subject: Prescription Question    Monalisa Grijalva can you call in eliquis 5mg 2 times a day? That would be 90 days worth. Thankyou ran ps [facility].

## 2021-01-09 ENCOUNTER — PATIENT MESSAGE (OUTPATIENT)
Dept: FAMILY MEDICINE CLINIC | Age: 61
End: 2021-01-09

## 2021-01-09 NOTE — TELEPHONE ENCOUNTER
From: Rehana Ojeda II  To: TORREY Tucker CNP  Sent: 1/9/2021 3:15 PM EST  Subject: Prescription Question    Should I be taking lisinopril 20mg once a day haven't been taking thanks

## 2021-01-22 ENCOUNTER — PATIENT MESSAGE (OUTPATIENT)
Dept: FAMILY MEDICINE CLINIC | Age: 61
End: 2021-01-22

## 2021-01-22 DIAGNOSIS — G89.29 CHRONIC MIDLINE LOW BACK PAIN WITHOUT SCIATICA: Primary | ICD-10-CM

## 2021-01-22 DIAGNOSIS — M54.50 CHRONIC MIDLINE LOW BACK PAIN WITHOUT SCIATICA: Primary | ICD-10-CM

## 2021-01-22 RX ORDER — TRAMADOL HYDROCHLORIDE 50 MG/1
100 TABLET ORAL EVERY EVENING
Qty: 60 TABLET | Refills: 0 | Status: SHIPPED | OUTPATIENT
Start: 2021-01-22 | End: 2021-02-18 | Stop reason: SDUPTHER

## 2021-01-22 NOTE — TELEPHONE ENCOUNTER
From: Yanira Angulo II  To: TORREY Noble - BRIT  Sent: 1/22/2021 11:23 AM EST  Subject: Prescription Question    Lianet Rivera going to be out of tramadol 50mg.  2 at [facility] [phone] thanks starting therapy soon thanks

## 2021-01-25 RX ORDER — CALCIUM CITRATE/VITAMIN D3 200MG-6.25
TABLET ORAL
Qty: 150 STRIP | Refills: 0 | Status: SHIPPED | OUTPATIENT
Start: 2021-01-25

## 2021-01-25 NOTE — TELEPHONE ENCOUNTER
Last visit: 12/28/20  Last Med refill: 12/21/20  Does patient have enough medication for 72 hours: No:      Next Visit Date:02/01/21  Future Appointments   Date Time Provider Hieu Jorgensen   2/1/2021  1:30 PM TORREY Seaman CNP 93 Maintenance   Topic Date Due    Diabetic foot exam  01/13/1968    Diabetic retinal exam  01/13/1968    DTaP/Tdap/Td vaccine (1 - Tdap) 01/13/1977    Shingles Vaccine (1 of 2) 01/13/2008    Colon cancer screen colonoscopy  01/13/2008    Flu vaccine (1) 12/28/2021 (Originally 9/1/2020)    Diabetic microalbuminuria test  06/22/2021    Lipid screen  06/22/2021    A1C test (Diabetic or Prediabetic)  12/18/2021    Potassium monitoring  12/21/2021    Creatinine monitoring  12/21/2021    Pneumococcal 0-64 years Vaccine  Completed    Hepatitis C screen  Completed    HIV screen  Completed    Hepatitis A vaccine  Aged Out    Hib vaccine  Aged Out    Meningococcal (ACWY) vaccine  Aged Out       Hemoglobin A1C (%)   Date Value   12/18/2020 6.0   11/02/2020 6.4   09/08/2020 11.2 (H)             ( goal A1C is < 7)   Microalb/Crt.  Ratio (mcg/mg creat)   Date Value   06/22/2020 43 (H)     LDL Cholesterol (mg/dL)   Date Value   06/22/2020 63       (goal LDL is <100)   AST (U/L)   Date Value   11/10/2020 25     ALT (U/L)   Date Value   11/10/2020 38     BUN (mg/dL)   Date Value   12/21/2020 21     BP Readings from Last 3 Encounters:   12/28/20 108/67   12/21/20 108/83   12/18/20 101/86          (goal 120/80)    All Future Testing planned in [facility]  Lab Frequency Next Occurrence   CBC With Auto Differential Once 40/32/7845   Basic Metabolic Panel Once 01/80/1183   Basic Metabolic Panel Once 53/43/6410   Basic Metabolic Panel Once 02/73/0807   COVID-19 Once 12/14/2020   Nasal cannula oxygen PRN    Hemoglobin and Hematocrit, Blood, Post Transfusion POST TRANSFUSION                Patient Active Problem List:     Cellulitis     Type 2 diabetes mellitus without complication ([address]     Essential hypertension     Morbid obesity due to excess calories (HCC)     Chronic kidney disease (CKD)     DEBBI (acute kidney injury) ([address]     Bowel obstruction (HCC)     Small bowel obstruction (HCC)     Moderate malnutrition ([address]     Hyperglycemia     Sleep apnea     Colostomy in place [facility])

## 2021-01-29 ENCOUNTER — TELEPHONE (OUTPATIENT)
Dept: FAMILY MEDICINE CLINIC | Age: 61
End: 2021-01-29

## 2021-01-29 NOTE — TELEPHONE ENCOUNTER
[facility] calling they are discharging from homecare today they state he is doing well,.  He has appt with you Monday

## 2021-02-01 ENCOUNTER — OFFICE VISIT (OUTPATIENT)
Dept: FAMILY MEDICINE CLINIC | Age: 61
End: 2021-02-01
Payer: COMMERCIAL

## 2021-02-01 VITALS
HEIGHT: 71 IN | SYSTOLIC BLOOD PRESSURE: 132 MMHG | BODY MASS INDEX: 31.98 KG/M2 | DIASTOLIC BLOOD PRESSURE: 74 MMHG | HEART RATE: 84 BPM | TEMPERATURE: 97 F | OXYGEN SATURATION: 98 % | WEIGHT: 228.4 LBS

## 2021-02-01 DIAGNOSIS — E11.9 TYPE 2 DIABETES MELLITUS WITHOUT COMPLICATION, WITHOUT LONG-TERM CURRENT USE OF INSULIN (HCC): Primary | ICD-10-CM

## 2021-02-01 DIAGNOSIS — Z12.11 SCREEN FOR COLON CANCER: ICD-10-CM

## 2021-02-01 LAB — HBA1C MFR BLD: 5.3 %

## 2021-02-01 PROCEDURE — 99213 OFFICE O/P EST LOW 20 MIN: CPT | Performed by: NURSE PRACTITIONER

## 2021-02-01 PROCEDURE — 83036 HEMOGLOBIN GLYCOSYLATED A1C: CPT | Performed by: NURSE PRACTITIONER

## 2021-02-01 ASSESSMENT — ENCOUNTER SYMPTOMS
CONSTIPATION: 0
NAUSEA: 0
VOMITING: 0
APNEA: 1
COUGH: 0
SHORTNESS OF BREATH: 0
CHEST TIGHTNESS: 0
DIARRHEA: 0
ABDOMINAL PAIN: 0

## 2021-02-01 ASSESSMENT — PATIENT HEALTH QUESTIONNAIRE - PHQ9
SUM OF ALL RESPONSES TO PHQ QUESTIONS 1-9: 0
2. FEELING DOWN, DEPRESSED OR HOPELESS: 0
SUM OF ALL RESPONSES TO PHQ9 QUESTIONS 1 & 2: 0
SUM OF ALL RESPONSES TO PHQ QUESTIONS 1-9: 0
1. LITTLE INTEREST OR PLEASURE IN DOING THINGS: 0

## 2021-02-01 NOTE — PROGRESS NOTES
Visit Information    Have you changed or started any medications since your last visit including any over-the-counter medicines, vitamins, or herbal medicines? no   Have you stopped taking any of your medications? Is so, why? -  no  Are you having any side effects from any of your medications? - no    Have you seen any other physician or provider since your last visit?  no   Have you had any other diagnostic tests since your last visit?  no   Have you been seen in the emergency room and/or had an admission in a hospital since we last saw you?  no   Have you had your routine dental cleaning in the past 6 months?  no     Do you have an active MyChart account? If no, what is the barrier?   Yes    Patient Care Team:  TORREY Berrios CNP as PCP - General (Family Nurse Practitioner)  TORREY Berrios CNP as PCP - [facility] Provider    Medical History Review  Past Medical, Family, and Social History reviewed and  contribute to the patient presenting condition    Health Maintenance   Topic Date Due    Diabetic foot exam  01/13/1968    Diabetic retinal exam  01/13/1968    DTaP/Tdap/Td vaccine (1 - Tdap) 01/13/1977    Shingles Vaccine (1 of 2) 01/13/2008    Colon cancer screen colonoscopy  01/13/2008    Flu vaccine (1) 12/28/2021 (Originally 9/1/2020)    Diabetic microalbuminuria test  06/22/2021    Lipid screen  06/22/2021    A1C test (Diabetic or Prediabetic)  12/18/2021    Potassium monitoring  12/21/2021    Creatinine monitoring  12/21/2021    Pneumococcal 0-64 years Vaccine  Completed    Hepatitis C screen  Completed    HIV screen  Completed    Hepatitis A vaccine  Aged Out    Hib vaccine  Aged Out    Meningococcal (ACWY) vaccine  Aged Out

## 2021-02-01 NOTE — PROGRESS NOTES
[address]  [place], [address]  [phone]      Kenyatta Encarnacion II is a 59 y.o. male who presents today for his  medicalconditions/complaints as noted below. Kenyatta Encarnacion II is c/o of Diabetes (blood sugar this morning was 99 before eating)  . HPI:    Cleared from surgeon to go back to work this month. He is doing well overall and slowly increasing his activity. Continues with new cpap machine and mask. Diabetes  He presents for his follow-up diabetic visit. He has type 2 diabetes mellitus. His disease course has been improving. Pertinent negatives for hypoglycemia include no dizziness, headaches, nervousness/anxiousness or tremors. Associated symptoms include foot paresthesias. Pertinent negatives for diabetes include no chest pain, no fatigue, no polydipsia, no polyphagia, no polyuria and no weakness. There are no hypoglycemic complications. Symptoms are stable. There are no diabetic complications. Risk factors for coronary artery disease include diabetes mellitus, dyslipidemia, male sex, hypertension, obesity, sedentary lifestyle and stress. Current diabetic treatment includes diet. He is compliant with treatment all of the time. His weight is stable. He is following a diabetic diet. Meal planning includes avoidance of concentrated sweets. He participates in exercise intermittently. An ACE inhibitor/angiotensin II receptor blocker is not being taken. He sees a podiatrist.Eye exam is not current. Past Medical History:   Diagnosis Date    Arthritis     BILATERAL KNEES    Back pain     Colostomy RUQ 09/23/2020    Frequent headaches     10/28/2020 PATIENT STATES EVERY OTHER DAY.  Gout     History of atrial fibrillation     AFTER SURGERY ON 09/23/2020 WENT INTO A FIB. CONVERTED BACK TO NORMAL RHYTHM ON HIS OWN.  CARDIOLOGIST DR Ozzy Spencer History of blood transfusion     NO REACTION    [place] (hard of hearing)     HTN     Lisa Deepali, CNP  Hyperlipidemia     Sleep apnea     USES CPAP MACHINE    T2DM     K.  Mellisa Zeyad, CNP    Tooth missing     RIGHT UPPER 2ND MOLAR    Wears glasses     READING      Past Surgical History:   Procedure Laterality Date    ADENOIDECTOMY      TWICE AS A CHILD    ANKLE SURGERY Right 2010    RUPTURED ACHILES    CARPAL TUNNEL RELEASE Bilateral     COLOSTOMY      temporary    CYSTO/URETERO/PYELOSCOPY, CALCULUS TX Right 10/30/2020    HOLMIUM-STANDBY, CYSTOSCOPY, URETEROSCOPY, STENT EXCHANGE performed by Dionicio Zhao MD at [facility] Right 09/01/2020    CYSTOSCOPY RIGHT URETERAL STENT INSERTION performed by Pablo Thomson MD at [address] [facility] CATH POWER PICC TRIPLE  09/03/2020    REMOVED AROUND 10/07/2020    KIDNEY SURGERY Left 09/02/2020    LAPAROSCOPIC XI ROBOTIC NEPHRECTOMY performed by Steve Zee MD at [address] [facility] ARTHROSCOPY Bilateral     KNEE SURGERY Bilateral     LAPAROSCOPY  12/18/2020    EXPLORATORY LAPAROTOMY,COLOSTOMY REVERSAL    LAPAROTOMY N/A 09/23/2020    LAPAROTOMY EXPLORATORY performed by Rosa Hernandez DO at [address] [place] [address] N/A 09/02/2020    EXPLORATORY LAPAROTOMY, RESECTION OF PROXIMAL JEJUNUM AND DESCENDING COLON WITH MOBILIZATION OF SPLENIC FLEXURE AND PRIMARY ANASTAMOSISOF SMALL BOWEL AND COLON, PLACEMENT OF GASTROSTOMY TUBE performed by Rosa Hernandez DO at [address] [place] [address] N/A 12/18/2020    EXPLORATORY LAPAROTOMY,COLOSTOMY REVERSAL, TAP BLOCK DONE IN OR BY DR Kate Groves performed by Rosa Hernandez DO at [address] [place] [address] 3 TIMES  AS A CHILD     Family History   Problem Relation Age of Onset    Stroke Maternal Grandmother     Stroke Maternal Grandfather     Other Mother         AORTIC BYPASS LED TO KIDNEY FAILURE    Kidney Disease Mother     Diabetes Father      Social History     Tobacco Use    Smoking status: Never Smoker    Smokeless tobacco: Former User     Types: Chew   Substance Use Topics  Alcohol use: Not Currently     Alcohol/week: 0.0 standard drinks      Current Outpatient Medications   Medication Sig Dispense Refill    blood glucose test strips (TRUE METRIX BLOOD GLUCOSE TEST) strip PLEASE SEE ATTACHED FOR DETAILED DIRECTIONS 150 strip 0    traMADol (ULTRAM) 50 MG tablet Take 2 tablets by mouth every evening for 30 days. 60 tablet 0    dilTIAZem (CARDIZEM) 60 MG tablet Take 60 mg by mouth every 12 hours      DULoxetine (CYMBALTA) 20 MG extended release capsule TAKE 1 CAPSULE BY MOUTH EVERY DAY 90 capsule 3    ferrous sulfate (IRON 325) 325 (65 Fe) MG tablet Take 1 tablet by mouth daily (with breakfast) 180 tablet 1    cyclobenzaprine (FLEXERIL) 10 MG tablet Take 1 tablet by mouth 2 times daily as needed for Muscle spasms 180 tablet 1    allopurinol (ZYLOPRIM) 100 MG tablet Take 1 tablet by mouth daily 90 tablet 1    hydrALAZINE (APRESOLINE) 25 MG tablet Take 1 tablet by mouth 3 times daily 270 tablet 1    zolpidem (AMBIEN) 10 MG tablet Take 1 tablet by mouth nightly as needed for Sleep for up to 90 days.  90 tablet 0    apixaban (ELIQUIS) 5 MG TABS tablet Take 1 tablet by mouth 2 times daily (Patient taking differently: Take 5 mg by mouth 2 times daily Stopped 12/16/2020) 180 tablet 1    CPAP Machine MISC use as directed      NOVOLOG FLEXPEN 100 UNIT/ML injection pen Inject into the skin 3 times daily (before meals) SLIDING SCALE: BLOOD SUGAR =NO INSULIN.   140-199=1 UNIT,  200-249=2 UNITS,  250-299=3 UNITS,  300-349= 4 UNITS,  350-399=5 UNITS,  >399= 6 UNITS      B-D UF III MINI PEN NEEDLES 31G X 5 MM MISC       Respiratory Therapy Supplies (CARETOUCH CPAP & BIPAP HOSE) MISC Mask and Tubing      Melatonin 10 MG TABS Take 1 tablet by mouth nightly as needed      Multiple Vitamins-Minerals (THERAPEUTIC MULTIVITAMIN-MINERALS) tablet Take 1 tablet by mouth daily 90 tablet 1    vitamin C (ASCORBIC ACID) 500 MG tablet Take 500 mg by mouth daily  aspirin 81 MG tablet Take 81 mg by mouth nightly Stopped 12/14/2020      pravastatin (PRAVACHOL) 20 MG tablet Take 20 mg by mouth daily      metoprolol (LOPRESSOR) 50 MG tablet Take 50 mg by mouth 2 times daily      acetaminophen (TYLENOL) 500 MG tablet Take 2 tablets by mouth every 6 hours as needed for Pain 40 tablet 0     No current facility-administered medications for this visit. Allergies   Allergen Reactions    Ampicillin Swelling     Swelling of throat.  Pcn [Penicillins] Swelling     Throat swelling. Tolerated cefepime during 8/31/20 admission.  Sulfa Antibiotics      Other reaction(s): Unknown    Tape [Adhesive Tape] Other (See Comments)     CAUSES REDNESS. PAPER TAPE OK. Health Maintenance   Topic Date Due    Diabetic retinal exam  01/13/1968    Shingles Vaccine (1 of 2) 01/13/2008    Colon cancer screen colonoscopy  01/13/2008    Flu vaccine (1) 12/28/2021 (Originally 9/1/2020)    DTaP/Tdap/Td vaccine (1 - Tdap) 02/01/2022 (Originally 1/13/1977)    Diabetic microalbuminuria test  06/22/2021    Lipid screen  06/22/2021    Potassium monitoring  12/21/2021    Creatinine monitoring  12/21/2021    Diabetic foot exam  02/01/2022    A1C test (Diabetic or Prediabetic)  02/01/2022    Pneumococcal 0-64 years Vaccine  Completed    Hepatitis C screen  Completed    HIV screen  Completed    Hepatitis A vaccine  Aged Out    Hib vaccine  Aged Out    Meningococcal (ACWY) vaccine  Aged Out       Subjective:      Review of Systems   Constitutional: Negative for activity change, appetite change, chills, diaphoresis, fatigue, fever and unexpected weight change. Eyes: Negative for visual disturbance. Respiratory: Positive for apnea. Negative for cough, chest tightness and shortness of breath. Cardiovascular: Negative for chest pain, palpitations and leg swelling. Gastrointestinal: Negative for abdominal pain, constipation, diarrhea, nausea and vomiting. Endocrine: Negative for polydipsia, polyphagia and polyuria. Genitourinary: Negative for difficulty urinating. Skin: Positive for wound (healing well, just saw surgeon today). Negative for rash. Neurological: Positive for numbness (feet). Negative for dizziness, tremors, weakness and headaches. Hematological: Negative for adenopathy. Psychiatric/Behavioral: Positive for sleep disturbance. The patient is not nervous/anxious. Objective:      Physical Exam  Vitals signs and nursing note reviewed. Constitutional:       General: He is not in acute distress. Appearance: Normal appearance. He is well-developed. He is obese. He is not ill-appearing or diaphoretic. HENT:      Head: Normocephalic and atraumatic. Eyes:      Conjunctiva/sclera: Conjunctivae normal.   Neck:      Musculoskeletal: Neck supple. Cardiovascular:      Rate and Rhythm: Normal rate and regular rhythm. Pulses:           Dorsalis pedis pulses are 2+ on the right side and 2+ on the left side. Posterior tibial pulses are 2+ on the right side and 2+ on the left side. Heart sounds: Normal heart sounds. Pulmonary:      Effort: Pulmonary effort is normal.      Breath sounds: Normal breath sounds. Musculoskeletal:      Right foot: Normal range of motion. No deformity or Charcot foot. Left foot: Normal range of motion. No deformity or Charcot foot. Feet:      Right foot:      Protective Sensation: 10 sites tested. 3 sites sensed. Skin integrity: Dry skin present. No ulcer, blister, skin breakdown, erythema or warmth. Toenail Condition: Right toenails are abnormally thick. Fungal disease present. Left foot:      Protective Sensation: 10 sites tested. 3 sites sensed. Skin integrity: Dry skin present. No ulcer, blister, skin breakdown, erythema or warmth. Toenail Condition: Left toenails are abnormally thick. Fungal disease present. Skin:     General: Skin is warm and dry. Findings: No erythema or rash. Neurological:      General: No focal deficit present. Mental Status: He is alert and oriented to person, place, and time. Mental status is at baseline. Psychiatric:         Mood and Affect: Mood normal.         Behavior: Behavior normal.         Thought Content: Thought content normal.         Judgment: Judgment normal.         Assessment:       Diagnosis Orders   1. Type 2 diabetes mellitus without complication, without long-term current use of insulin ([facility])  POCT glycosylated hemoglobin (Hb A1C)     DIABETES FOOT EXAM   2. Establishing care with new doctor, encounter for     3. Screen for colon cancer  Cologuard (For External Results Only)     Results for orders placed or performed in visit on 02/01/21   POCT glycosylated hemoglobin (Hb A1C)   Result Value Ref Range    Hemoglobin A1C 5.3 %     Wt Readings from Last 3 Encounters:   02/01/21 228 lb 6.4 oz (103.6 kg)   12/28/20 215 lb 3.2 oz (97.6 kg)   12/18/20 214 lb 11.7 oz (97.4 kg)     BP Readings from Last 3 Encounters:   02/01/21 132/74   12/28/20 108/67   12/21/20 108/83       Plan:      Return in about 6 months (around 8/1/2021) for return for diabetes management. Orders Placed This Encounter   Procedures    Cologuard (For External Results Only)     This test is performed by an external laboratory and is used for result attachment only. It is required that this order requisition be faxed to: [facility] @ [phone]. See [email or web address] for further information. Standing Status:   Future     Standing Expiration Date:   2/1/2022    POCT glycosylated hemoglobin (Hb A1C)     DIABETES FOOT EXAM     DM:   Well controlled with diet  Increase exercise as able and keep weight under control  Check feet daily and f/u with podiatry  Health Maintenance reviewed - patient asked to schedule diabetic eye exam.  Declines tdap    Send back cologuard kit asap  Continue other same meds Patient given educational materials - see patient instructions. Discussed use,benefit, and side effects of prescribed medications. All patient questions answered. Pt voiced understanding. Reviewed health maintenance. Instructed to continue currentmedications, diet and exercise.     Electronically signed by Salome Slaughter CNP on 2/1/2021 at 1:53 PM

## 2021-02-18 ENCOUNTER — PATIENT MESSAGE (OUTPATIENT)
Dept: FAMILY MEDICINE CLINIC | Age: 61
End: 2021-02-18

## 2021-02-18 DIAGNOSIS — M54.50 CHRONIC MIDLINE LOW BACK PAIN WITHOUT SCIATICA: ICD-10-CM

## 2021-02-18 DIAGNOSIS — G89.29 CHRONIC MIDLINE LOW BACK PAIN WITHOUT SCIATICA: ICD-10-CM

## 2021-02-18 RX ORDER — TRAMADOL HYDROCHLORIDE 50 MG/1
100 TABLET ORAL 2 TIMES DAILY
Qty: 120 TABLET | Refills: 0 | Status: SHIPPED | OUTPATIENT
Start: 2021-02-18 | End: 2021-03-29 | Stop reason: SDUPTHER

## 2021-02-18 NOTE — TELEPHONE ENCOUNTER
From: Alejandra Fuentes II  To: Dwayne Henao, APRN - CNP  Sent: 2/18/2021 3:14 AM EST  Subject: Prescription Question    Gonzalez Berger getting ready to go back to work I need a refill.  Of tramadol 50mg 2 tablets every evening going back I'm sure my back will be hurting I was wondering if I could get 4 tablets a day

## 2021-03-29 ENCOUNTER — PATIENT MESSAGE (OUTPATIENT)
Dept: FAMILY MEDICINE CLINIC | Age: 61
End: 2021-03-29

## 2021-03-29 DIAGNOSIS — G89.29 CHRONIC MIDLINE LOW BACK PAIN WITHOUT SCIATICA: ICD-10-CM

## 2021-03-29 DIAGNOSIS — M54.50 CHRONIC MIDLINE LOW BACK PAIN WITHOUT SCIATICA: ICD-10-CM

## 2021-03-29 RX ORDER — TRAMADOL HYDROCHLORIDE 50 MG/1
100 TABLET ORAL EVERY EVENING
Qty: 60 TABLET | Refills: 0 | Status: SHIPPED | OUTPATIENT
Start: 2021-03-29 | End: 2021-04-28

## 2021-03-29 NOTE — TELEPHONE ENCOUNTER
From: Harmony Arndt II  To: TORREY Evans - CNP  Sent: 3/29/2021 2:17 PM EDT  Subject: Prescription Question    I'm out of tramadol 50 mg 2 at night if you can call a refill that's half of last thanks ran

## 2021-04-08 NOTE — ED NOTES
Writer spoke with Dr. Antonia Unger with family medicine as the admitting team for pt. Dr. Antonia Unger stated he did not have access to a computer and requested for writer to contact the house Supervisor for help to discharge pt with verbal orders from Dr. Antonia Unger over the phone.          [address] [place] [address]  09/18/20 1097 Complex Repair And W Plasty Text: The defect edges were debeveled with a #15 scalpel blade.  The primary defect was closed partially with a complex linear closure.  Given the location of the remaining defect, shape of the defect and the proximity to free margins a W plasty was deemed most appropriate for complete closure of the defect.  Using a sterile surgical marker, an appropriate advancement flap was drawn incorporating the defect and placing the expected incisions within the relaxed skin tension lines where possible.    The area thus outlined was incised deep to adipose tissue with a #15 scalpel blade.  The skin margins were undermined to an appropriate distance in all directions utilizing iris scissors.

## 2021-05-12 ENCOUNTER — PATIENT MESSAGE (OUTPATIENT)
Dept: FAMILY MEDICINE CLINIC | Age: 61
End: 2021-05-12

## 2021-05-12 DIAGNOSIS — G47.00 INSOMNIA, UNSPECIFIED TYPE: Primary | ICD-10-CM

## 2021-05-12 RX ORDER — DILTIAZEM HYDROCHLORIDE 60 MG/1
60 TABLET, FILM COATED ORAL EVERY 12 HOURS
Qty: 180 TABLET | Refills: 3 | Status: SHIPPED | OUTPATIENT
Start: 2021-05-12 | End: 2022-02-10

## 2021-05-12 NOTE — TELEPHONE ENCOUNTER
From: Leena Alberts II  To: TORREY Mcclain - CNP  Sent: 5/12/2021 3:16 PM EDT  Subject: Prescription Question    Shorty Tran I need zolpidem tartrate I was wondering if I could go up in dose I'm at 10 mg it's hard to fall asleep with that also all my scripts need to be 90 days I also need ic diltiazem 60 mg2 times a day stopped tramadol thanks

## 2021-05-13 RX ORDER — ZOLPIDEM TARTRATE 12.5 MG/1
12.5 TABLET, FILM COATED, EXTENDED RELEASE ORAL NIGHTLY PRN
Qty: 14 TABLET | Refills: 0 | Status: SHIPPED | OUTPATIENT
Start: 2021-05-13 | End: 2021-05-27 | Stop reason: SDUPTHER

## 2021-05-13 NOTE — TELEPHONE ENCOUNTER
From: Tammie Tran II  To: TORREY Schafer - CNP  Sent: 5/12/2021 6:12 PM EDT  Subject: Prescription Question    Yea I'll try it or do you think something else would work better

## 2021-05-27 ENCOUNTER — PATIENT MESSAGE (OUTPATIENT)
Dept: FAMILY MEDICINE CLINIC | Age: 61
End: 2021-05-27

## 2021-05-27 DIAGNOSIS — G47.00 INSOMNIA, UNSPECIFIED TYPE: ICD-10-CM

## 2021-05-27 RX ORDER — ZOLPIDEM TARTRATE 12.5 MG/1
12.5 TABLET, FILM COATED, EXTENDED RELEASE ORAL NIGHTLY PRN
Qty: 90 TABLET | Refills: 0 | Status: SHIPPED | OUTPATIENT
Start: 2021-05-27 | End: 2021-08-26 | Stop reason: SDUPTHER

## 2021-05-27 NOTE — TELEPHONE ENCOUNTER
From: Farzaneh Oscar II  To: Guillermina Mike, APRN - CNP  Sent: 5/27/2021 4:45 AM EDT  Subject: Prescription Question    Sorry that would be zolpidem 12.5 MG

## 2021-06-25 RX ORDER — CYCLOBENZAPRINE HCL 10 MG
10 TABLET ORAL 2 TIMES DAILY PRN
Qty: 180 TABLET | Refills: 1 | Status: SHIPPED | OUTPATIENT
Start: 2021-06-25 | End: 2021-12-03

## 2021-06-25 RX ORDER — ALLOPURINOL 100 MG/1
TABLET ORAL
Qty: 90 TABLET | Refills: 1 | Status: SHIPPED | OUTPATIENT
Start: 2021-06-25 | End: 2021-12-15

## 2021-07-14 ENCOUNTER — OFFICE VISIT (OUTPATIENT)
Dept: FAMILY MEDICINE CLINIC | Age: 61
End: 2021-07-14
Payer: COMMERCIAL

## 2021-07-14 VITALS
SYSTOLIC BLOOD PRESSURE: 138 MMHG | OXYGEN SATURATION: 96 % | BODY MASS INDEX: 38.56 KG/M2 | DIASTOLIC BLOOD PRESSURE: 82 MMHG | HEART RATE: 79 BPM | HEIGHT: 71 IN | WEIGHT: 275.4 LBS | TEMPERATURE: 97.5 F

## 2021-07-14 DIAGNOSIS — Z13.220 SCREENING FOR LIPOID DISORDERS: ICD-10-CM

## 2021-07-14 DIAGNOSIS — E11.9 TYPE 2 DIABETES MELLITUS WITHOUT COMPLICATION, UNSPECIFIED WHETHER LONG TERM INSULIN USE (HCC): ICD-10-CM

## 2021-07-14 DIAGNOSIS — K43.2 INCISIONAL HERNIA, WITHOUT OBSTRUCTION OR GANGRENE: Primary | ICD-10-CM

## 2021-07-14 DIAGNOSIS — Z12.5 SPECIAL SCREENING FOR MALIGNANT NEOPLASM OF PROSTATE: ICD-10-CM

## 2021-07-14 DIAGNOSIS — G47.00 INSOMNIA, UNSPECIFIED TYPE: ICD-10-CM

## 2021-07-14 PROCEDURE — 99214 OFFICE O/P EST MOD 30 MIN: CPT | Performed by: NURSE PRACTITIONER

## 2021-07-14 RX ORDER — HYDROXYZINE 50 MG/1
50 TABLET, FILM COATED ORAL NIGHTLY
Qty: 30 TABLET | Refills: 0 | Status: SHIPPED | OUTPATIENT
Start: 2021-07-14 | End: 2021-08-06

## 2021-07-14 RX ORDER — TAMSULOSIN HYDROCHLORIDE 0.4 MG/1
CAPSULE ORAL
COMMUNITY
Start: 2021-06-21 | End: 2021-09-10

## 2021-07-14 ASSESSMENT — ENCOUNTER SYMPTOMS
DIARRHEA: 0
COUGH: 0
NAUSEA: 0
BLOOD IN STOOL: 0
CHEST TIGHTNESS: 0
SHORTNESS OF BREATH: 0
VOMITING: 0
CONSTIPATION: 0
ABDOMINAL PAIN: 0

## 2021-07-14 NOTE — PROGRESS NOTES
Visit Information    Have you changed or started any medications since your last visit including any over-the-counter medicines, vitamins, or herbal medicines? no   Have you stopped taking any of your medications? Is so, why? -  no  Are you having any side effects from any of your medications? - no    Have you seen any other physician or provider since your last visit?  no   Have you had any other diagnostic tests since your last visit?  no   Have you been seen in the emergency room and/or had an admission in a hospital since we last saw you?  no   Have you had your routine dental cleaning in the past 6 months?  no     Do you have an active MyChart account? If no, what is the barrier?   Yes    Patient Care Team:  TORREY Macias CNP as PCP - General (Family Nurse Practitioner)  TORREY Macias CNP as PCP - [facility]    Medical History Review  Past Medical, Family, and Social History reviewed and  contribute to the patient presenting condition    Health Maintenance   Topic Date Due    Diabetic retinal exam  Never done    COVID-19 Vaccine (1) Never done    Colon cancer screen colonoscopy  Never done    Shingles Vaccine (1 of 2) Never done    Diabetic microalbuminuria test  06/22/2021    Lipid screen  06/22/2021    DTaP/Tdap/Td vaccine (1 - Tdap) 02/01/2022 (Originally 1/13/1979)    Flu vaccine (1) 09/01/2021    Potassium monitoring  12/21/2021    Creatinine monitoring  12/21/2021    Diabetic foot exam  02/01/2022    A1C test (Diabetic or Prediabetic)  02/01/2022    Pneumococcal 0-64 years Vaccine (2 of 2 - PPSV23) 01/13/2023    Hepatitis C screen  Completed    HIV screen  Completed    Hepatitis A vaccine  Aged Out    Hib vaccine  Aged Out    Meningococcal (ACWY) vaccine  Aged Out

## 2021-07-14 NOTE — PROGRESS NOTES
[address]  [place], [address]  [phone]      Talon Mccauley II is a 59 y.o. male who presents today for his  medicalconditions/complaints as noted below. Talon Mccauley II is c/o of Abdominal Pain (poss hernia umbilcal,huge bulge when he tries to  something,having good BM movements)  . HPI:    HPI    Pt here for concerns about potential hernia. He has had mx surgeries to his abdomen in the past and is noticing a bulge with position changes and sometimes pain in this area. Denies redness, or warmth. He was unsure if he should contact his surgeon? Also will send back cologuard kit asap. Needs something else for sleep. Has had to take 2 [employer] recently to get to sleep and feels like he can't shut his brain off. Has home life issues. Past Medical History:   Diagnosis Date    Arthritis     BILATERAL KNEES    Back pain     Colostomy RUQ 09/23/2020    Frequent headaches     10/28/2020 PATIENT STATES EVERY OTHER DAY.  Gout     History of atrial fibrillation     AFTER SURGERY ON 09/23/2020 WENT INTO A FIB. CONVERTED BACK TO NORMAL RHYTHM ON HIS OWN. CARDIOLOGIST DR Mariel Yip History of blood transfusion     NO REACTION    [place] (hard of hearing)     HTN     Geovanna Shore CNP    Hyperlipidemia     Sleep apnea     USES CPAP MACHINE    T2DM     K.  Reyestree Solano CNP    Tooth missing     RIGHT UPPER 2ND MOLAR    Wears glasses     READING      Past Surgical History:   Procedure Laterality Date    ADENOIDECTOMY      TWICE AS A CHILD    ANKLE SURGERY Right 2010    RUPTURED ACHILES    CARPAL TUNNEL RELEASE Bilateral     COLOSTOMY      temporary    CYSTO/URETERO/PYELOSCOPY, CALCULUS TX Right 10/30/2020    HOLMIUM-STANDBY, CYSTOSCOPY, URETEROSCOPY, STENT EXCHANGE performed by Suzanne Reza MD at [facility] Right 09/01/2020    CYSTOSCOPY RIGHT URETERAL STENT INSERTION performed by Mitul Matthews MD at [address] TRIPLE  09/03/2020    REMOVED AROUND 10/07/2020    KIDNEY SURGERY Left 09/02/2020    LAPAROSCOPIC XI ROBOTIC NEPHRECTOMY performed by Ly Berumen MD at [address] [place] [address] ARTHROSCOPY Bilateral     KNEE SURGERY Bilateral     LAPAROSCOPY  12/18/2020    EXPLORATORY LAPAROTOMY,COLOSTOMY REVERSAL    LAPAROTOMY N/A 09/23/2020    LAPAROTOMY EXPLORATORY performed by Jluián Mariano DO at [address] [place] [address] N/A 09/02/2020    EXPLORATORY LAPAROTOMY, RESECTION OF PROXIMAL JEJUNUM AND DESCENDING COLON WITH MOBILIZATION OF SPLENIC FLEXURE AND PRIMARY ANASTAMOSISOF SMALL BOWEL AND COLON, PLACEMENT OF GASTROSTOMY TUBE performed by Julián Mariano DO at [address] [place] [address] N/A 12/18/2020    EXPLORATORY LAPAROTOMY,COLOSTOMY REVERSAL, TAP BLOCK DONE IN OR BY DR Luis Ya performed by Julián Mariano DO at [address] [place] [address] 3 TIMES  AS A CHILD     Family History   Problem Relation Age of Onset    Stroke Maternal Grandmother     Stroke Maternal Grandfather     Other Mother         AORTIC BYPASS LED TO KIDNEY FAILURE    Kidney Disease Mother     Diabetes Father      Social History     Tobacco Use    Smoking status: Never Smoker    Smokeless tobacco: Former User     Types: Chew   Substance Use Topics    Alcohol use: Not Currently     Alcohol/week: 0.0 standard drinks      Current Outpatient Medications   Medication Sig Dispense Refill    hydrOXYzine (ATARAX) 50 MG tablet Take 1 tablet by mouth nightly 30 tablet 0    tamsulosin (FLOMAX) 0.4 MG capsule TAKE 1 CAPSULE BY MOUTH EVERY DAY      allopurinol (ZYLOPRIM) 100 MG tablet TAKE 1 TABLET BY MOUTH EVERY DAY 90 tablet 1    cyclobenzaprine (FLEXERIL) 10 MG tablet TAKE 1 TABLET BY MOUTH 2 TIMES DAILY AS NEEDED FOR MUSCLE SPASMS 180 tablet 1    zolpidem (AMBIEN CR) 12.5 MG extended release tablet Take 1 tablet by mouth nightly as needed for Sleep for up to 90 days.  90 tablet 0    dilTIAZem (CARDIZEM) 60 MG tablet Take 1 tablet by mouth every 12 hours 180 tablet 3    blood glucose test strips (TRUE METRIX BLOOD GLUCOSE TEST) strip PLEASE SEE ATTACHED FOR DETAILED DIRECTIONS 150 strip 0    DULoxetine (CYMBALTA) 20 MG extended release capsule TAKE 1 CAPSULE BY MOUTH EVERY DAY 90 capsule 3    ferrous sulfate (IRON 325) 325 (65 Fe) MG tablet Take 1 tablet by mouth daily (with breakfast) 180 tablet 1    hydrALAZINE (APRESOLINE) 25 MG tablet Take 1 tablet by mouth 3 times daily 270 tablet 1    apixaban (ELIQUIS) 5 MG TABS tablet Take 1 tablet by mouth 2 times daily (Patient taking differently: Take 5 mg by mouth 2 times daily Stopped 12/16/2020) 180 tablet 1    CPAP Machine MISC use as directed      NOVOLOG FLEXPEN 100 UNIT/ML injection pen Inject into the skin 3 times daily (before meals) SLIDING SCALE: BLOOD SUGAR =NO INSULIN.   140-199=1 UNIT,  200-249=2 UNITS,  250-299=3 UNITS,  300-349= 4 UNITS,  350-399=5 UNITS,  >399= 6 UNITS      B-D UF III MINI PEN NEEDLES 31G X 5 MM MISC       Respiratory Therapy Supplies (CARETOUCH CPAP & BIPAP HOSE) MISC Mask and Tubing      Melatonin 10 MG TABS Take 1 tablet by mouth nightly as needed      Multiple Vitamins-Minerals (THERAPEUTIC MULTIVITAMIN-MINERALS) tablet Take 1 tablet by mouth daily 90 tablet 1    vitamin C (ASCORBIC ACID) 500 MG tablet Take 500 mg by mouth daily      acetaminophen (TYLENOL) 500 MG tablet Take 2 tablets by mouth every 6 hours as needed for Pain 40 tablet 0    aspirin 81 MG tablet Take 81 mg by mouth nightly Stopped 12/14/2020      pravastatin (PRAVACHOL) 20 MG tablet Take 20 mg by mouth daily      metoprolol (LOPRESSOR) 50 MG tablet Take 50 mg by mouth 2 times daily       No current facility-administered medications for this visit. Allergies   Allergen Reactions    Ampicillin Swelling     Swelling of throat.  Pcn [Penicillins] Swelling     Throat swelling. Tolerated cefepime during 8/31/20 admission.     Sulfa Antibiotics      Other reaction(s): Unknown    Tape Warner Just Tape] Other (See Comments)     CAUSES REDNESS. PAPER TAPE OK. Health Maintenance   Topic Date Due    Diabetic retinal exam  Never done    COVID-19 Vaccine (1) Never done    Colon cancer screen colonoscopy  Never done    Shingles Vaccine (1 of 2) Never done    Diabetic microalbuminuria test  06/22/2021    Lipid screen  06/22/2021    DTaP/Tdap/Td vaccine (1 - Tdap) 02/01/2022 (Originally 1/13/1979)    Flu vaccine (1) 09/01/2021    Potassium monitoring  12/21/2021    Creatinine monitoring  12/21/2021    Diabetic foot exam  02/01/2022    A1C test (Diabetic or Prediabetic)  02/01/2022    Pneumococcal 0-64 years Vaccine (2 of 2 - PPSV23) 01/13/2023    Hepatitis C screen  Completed    HIV screen  Completed    Hepatitis A vaccine  Aged Out    Hib vaccine  Aged Out    Meningococcal (ACWY) vaccine  Aged Out       Subjective:      Review of Systems   Constitutional: Negative for appetite change, chills, diaphoresis, fatigue and fever. Eyes: Negative for visual disturbance. Respiratory: Negative for cough, chest tightness and shortness of breath. Cardiovascular: Negative for chest pain, palpitations and leg swelling. Gastrointestinal: Negative for abdominal pain, blood in stool, constipation, diarrhea, nausea and vomiting. Hernia   Skin: Negative for rash. Neurological: Negative for dizziness, weakness and headaches. Hematological: Negative for adenopathy. Psychiatric/Behavioral: Positive for sleep disturbance. Negative for dysphoric mood. The patient is nervous/anxious. Objective:      Physical Exam  Vitals and nursing note reviewed. Constitutional:       Appearance: Normal appearance. HENT:      Head: Normocephalic and atraumatic. Abdominal:      Comments: mx incisions to abdomen with new hernia in right upper abdomen, no erythema and mild tenderness present   Skin:     General: Skin is warm and dry.    Neurological: General: No focal deficit present. Mental Status: He is alert and oriented to person, place, and time. Mental status is at baseline. Psychiatric:         Mood and Affect: Mood normal.         Behavior: Behavior normal.         Thought Content: Thought content normal.         Judgment: Judgment normal.         Assessment:       Diagnosis Orders   1. Incisional hernia, without obstruction or gangrene     2. Special screening for malignant neoplasm of prostate  PSA screening   3. Screening for lipoid disorders  Lipid Panel   4. Type 2 diabetes mellitus without complication, unspecified whether long term insulin use (HCC)  Microalbumin, Ur   5. Insomnia, unspecified type  hydrOXYzine (ATARAX) 50 MG tablet     Wt Readings from Last 3 Encounters:   07/14/21 275 lb 6.4 oz (124.9 kg)   02/01/21 228 lb 6.4 oz (103.6 kg)   12/28/20 215 lb 3.2 oz (97.6 kg)       Plan:      Return if symptoms worsen or fail to improve. Orders Placed This Encounter   Procedures    Lipid Panel     PT FASTING 8-12 HOURS     Standing Status:   Future     Standing Expiration Date:   7/14/2022     Order Specific Question:   Is Patient Fasting?/# of Hours     Answer:   8-12 HOURS    PSA screening     Standing Status:   Future     Standing Expiration Date:   7/14/2022    Microalbumin, Ur     Standing Status:   Future     Standing Expiration Date:   7/14/2022     Orders Placed This Encounter   Medications    hydrOXYzine (ATARAX) 50 MG tablet     Sig: Take 1 tablet by mouth nightly     Dispense:  30 tablet     Refill:  0   sleep:  Trial hydroxyzine for sleep, ok to trial with ambien if needed but space out dose by 2 hours  Call INB or worsening    Update other labs  Will call with test results    Hernia:  Call dr Beni Jaramillo for repair options  Avoid heavy lifting    Send back cologuard kit asap  Patient given educational materials - see patient instructions. Discussed use,benefit, and side effects of prescribed medications.   All patient questions answered. Pt voiced understanding. Reviewed health maintenance. Instructed to continue currentmedications, diet and exercise.     Electronically signed by TORREY Biggs CNP, CNP on 7/14/2021 at 3:03 PM

## 2021-08-06 ENCOUNTER — HOSPITAL ENCOUNTER (OUTPATIENT)
Age: 61
Setting detail: SPECIMEN
Discharge: HOME | End: 2021-08-06
Payer: COMMERCIAL

## 2021-08-06 DIAGNOSIS — Z12.5 SPECIAL SCREENING FOR MALIGNANT NEOPLASM OF PROSTATE: ICD-10-CM

## 2021-08-06 DIAGNOSIS — E11.9 TYPE 2 DIABETES MELLITUS WITHOUT COMPLICATION, UNSPECIFIED WHETHER LONG TERM INSULIN USE (HCC): ICD-10-CM

## 2021-08-06 DIAGNOSIS — G47.00 INSOMNIA, UNSPECIFIED TYPE: ICD-10-CM

## 2021-08-06 DIAGNOSIS — Z13.220 SCREENING FOR LIPOID DISORDERS: ICD-10-CM

## 2021-08-06 LAB
CHOLESTEROL/HDL RATIO: 4.9
CHOLESTEROL: 136 MG/DL
CREATININE URINE: 109.9 MG/DL (ref 39–259)
HDLC SERPL-MCNC: 28 MG/DL
LDL CHOLESTEROL DIRECT: 53 MG/DL
LDL CHOLESTEROL: ABNORMAL MG/DL (ref 0–130)
MICROALBUMIN/CREAT 24H UR: 81 MG/L
MICROALBUMIN/CREAT UR-RTO: 74 MCG/MG CREAT
PROSTATE SPECIFIC ANTIGEN: 0.47 UG/L
TRIGL SERPL-MCNC: 456 MG/DL
VLDLC SERPL CALC-MCNC: ABNORMAL MG/DL (ref 1–30)

## 2021-08-06 RX ORDER — HYDROXYZINE 50 MG/1
TABLET, FILM COATED ORAL
Qty: 30 TABLET | Refills: 0 | Status: SHIPPED | OUTPATIENT
Start: 2021-08-06 | End: 2021-09-06

## 2021-08-25 ENCOUNTER — PATIENT MESSAGE (OUTPATIENT)
Dept: FAMILY MEDICINE CLINIC | Age: 61
End: 2021-08-25

## 2021-08-25 DIAGNOSIS — G47.00 INSOMNIA, UNSPECIFIED TYPE: ICD-10-CM

## 2021-08-26 RX ORDER — ZOLPIDEM TARTRATE 12.5 MG/1
12.5 TABLET, FILM COATED, EXTENDED RELEASE ORAL NIGHTLY PRN
Qty: 90 TABLET | Refills: 0 | Status: ON HOLD | OUTPATIENT
Start: 2021-08-26 | End: 2021-10-11 | Stop reason: SDUPTHER

## 2021-08-26 NOTE — TELEPHONE ENCOUNTER
From: Regina Mahoney II  To: TORREY Parikh CNP  Sent: 8/25/2021 10:48 PM EDT  Subject: Prescription Question    [place] I need a 90 day refill of zolpidiem 12.5 thanks ran liz called in to [facility] [place]

## 2021-09-09 RX ORDER — SODIUM CHLORIDE, SODIUM LACTATE, POTASSIUM CHLORIDE, CALCIUM CHLORIDE 600; 310; 30; 20 MG/100ML; MG/100ML; MG/100ML; MG/100ML
1000 INJECTION, SOLUTION INTRAVENOUS CONTINUOUS
Status: CANCELLED | OUTPATIENT
Start: 2021-09-09

## 2021-09-10 ENCOUNTER — HOSPITAL ENCOUNTER (OUTPATIENT)
Dept: PREADMISSION TESTING | Age: 61
Discharge: HOME | End: 2021-09-14
Payer: COMMERCIAL

## 2021-09-10 VITALS
SYSTOLIC BLOOD PRESSURE: 112 MMHG | HEART RATE: 75 BPM | BODY MASS INDEX: 39.48 KG/M2 | DIASTOLIC BLOOD PRESSURE: 76 MMHG | OXYGEN SATURATION: 98 % | TEMPERATURE: 97.5 F | RESPIRATION RATE: 18 BRPM | WEIGHT: 282 LBS | HEIGHT: 71 IN

## 2021-09-10 LAB
ANION GAP SERPL CALCULATED.3IONS-SCNC: 13 MMOL/L (ref 9–17)
BUN BLDV-MCNC: 36 MG/DL (ref 8–23)
CHLORIDE BLD-SCNC: 108 MMOL/L (ref 98–107)
CO2: 16 MMOL/L (ref 20–31)
CREAT SERPL-MCNC: 1.85 MG/DL (ref 0.7–1.2)
GFR AFRICAN AMERICAN: 45 ML/MIN
GFR NON-AFRICAN AMERICAN: 37 ML/MIN
GFR SERPL CREATININE-BSD FRML MDRD: ABNORMAL ML/MIN/{1.73_M2}
GFR SERPL CREATININE-BSD FRML MDRD: ABNORMAL ML/MIN/{1.73_M2}
GLUCOSE BLD-MCNC: 223 MG/DL (ref 70–99)
HCT VFR BLD CALC: 41.3 % (ref 40.7–50.3)
HEMOGLOBIN: 13.5 G/DL (ref 13–17)
POTASSIUM SERPL-SCNC: 5.4 MMOL/L (ref 3.7–5.3)
SODIUM BLD-SCNC: 137 MMOL/L (ref 135–144)

## 2021-09-10 PROCEDURE — 85018 HEMOGLOBIN: CPT

## 2021-09-10 PROCEDURE — 80051 ELECTROLYTE PANEL: CPT

## 2021-09-10 PROCEDURE — 82565 ASSAY OF CREATININE: CPT

## 2021-09-10 PROCEDURE — 82947 ASSAY GLUCOSE BLOOD QUANT: CPT

## 2021-09-10 PROCEDURE — 36415 COLL VENOUS BLD VENIPUNCTURE: CPT

## 2021-09-10 PROCEDURE — 84520 ASSAY OF UREA NITROGEN: CPT

## 2021-09-10 PROCEDURE — 85014 HEMATOCRIT: CPT

## 2021-09-10 ASSESSMENT — PAIN SCALES - GENERAL: PAINLEVEL_OUTOF10: 3

## 2021-09-10 ASSESSMENT — PAIN DESCRIPTION - PAIN TYPE: TYPE: CHRONIC PAIN

## 2021-09-10 ASSESSMENT — PAIN DESCRIPTION - LOCATION: LOCATION: ABDOMEN

## 2021-09-10 ASSESSMENT — PAIN DESCRIPTION - ORIENTATION: ORIENTATION: MID

## 2021-09-10 ASSESSMENT — PAIN DESCRIPTION - FREQUENCY: FREQUENCY: INTERMITTENT

## 2021-09-10 NOTE — PROGRESS NOTES
Anesthesia Focused Assessment    Hx of anesthesia complications:  no  Family hx of anesthesia complications:  no      Prior + Covid-19 test? no      STOP-BANG Sleep Apnea Questionnaire    SNORE loudly (heard through closed doors)? Yes  TIRED, fatigued, sleepy during daytime? Yes  OBSERVED stopping breathing during sleep? Yes  High blood PRESSURE or being treated? Yes    BMI over 35? Yes  AGE over 48? Yes  NECK circumference over 16\"? Yes  GENDER (male)? Yes             Total 8  High risk 5-8  Intermediate risk 3-4  Low risk 0-2    ----------------------------------------------------------------------------------------------------------------------  LISA                              Yes  If yes, machine? Yes    DM1                                            No  DM2                   Yes    Coronary Artery Disease      No  HTN         Yes  Defib/AICD/Pacemaker               No             Renal Failure                   No  If yes, on dialysis           Active smoker? No  Drinks alcohol? Yes occasionally  Illicit drugs? Yes, occasionally THC gummies  Dentition?        benign      Past Medical History:   Diagnosis Date    Arthritis     BILATERAL KNEES    Back pain     Colostomy RUQ 09/23/2020    Frequent headaches     10/28/2020 PATIENT STATES EVERY OTHER DAY.  Gout     History of atrial fibrillation     AFTER SURGERY ON 09/23/2020 WENT INTO A FIB. CONVERTED BACK TO NORMAL RHYTHM ON HIS OWN. CARDIOLOGIST DR Cornelio Diaz History of blood transfusion     NO REACTION    [place] (hard of hearing)     HTN     Wilber Roa, CNP    Hyperlipidemia     Sleep apnea     USES CPAP MACHINE    T2DM     KSudhir Stanley, CNP    Tooth missing     RIGHT UPPER 2ND MOLAR    Wears glasses     READING         Patient was evaluated in PAT & anesthesia guidelines were applied. NPO guidelines, medication instructions and scheduled arrival time were reviewed with patient.

## 2021-09-10 NOTE — H&P (VIEW-ONLY)
History and Physical    Pt Name: Zak Cummings  MRN: [id number]  YOB: 1960  Date of evaluation: 9/10/2021  Primary Care Physician: TORREY Hansen CNP    SUBJECTIVE:   History of Chief Complaint:    Essie Kent II is a 59 y.o. male who presents for PAT appointment. Patient states he noticed a hernia to his epigastric region about two months ago. He states it will protrude when he is carrying items or straining. He states he does have mild epigastric pain, which he rates a 3/10 but is tolerable; he does not take any pain medication for the discomfort. Patient has been scheduled for [address] [place]  Allergies  is allergic to ampicillin, pcn [penicillins], sulfa antibiotics, and tape [adhesive tape]. Medications  Prior to Admission medications    Medication Sig Start Date End Date Taking? Authorizing Provider   hydrOXYzine (ATARAX) 50 MG tablet TAKE 1 TABLET BY MOUTH EVERY DAY AT NIGHT 9/6/21  Yes TORREY Hansen CNP   zolpidem (AMBIEN CR) 12.5 MG extended release tablet Take 1 tablet by mouth nightly as needed for Sleep for up to 90 days.  8/26/21 11/24/21 Yes TORREY Hansen CNP   allopurinol (ZYLOPRIM) 100 MG tablet TAKE 1 TABLET BY MOUTH EVERY DAY 6/25/21  Yes TORREY Hansen CNP   cyclobenzaprine (FLEXERIL) 10 MG tablet TAKE 1 TABLET BY MOUTH 2 TIMES DAILY AS NEEDED FOR MUSCLE SPASMS 6/25/21  Yes TORREY Hansen CNP   dilTIAZem (CARDIZEM) 60 MG tablet Take 1 tablet by mouth every 12 hours 5/12/21  Yes TORREY Hansen CNP   DULoxetine (CYMBALTA) 20 MG extended release capsule TAKE 1 CAPSULE BY MOUTH EVERY DAY 12/28/20  Yes TORREY Hansen CNP   ferrous sulfate (IRON 325) 325 (65 Fe) MG tablet Take 1 tablet by mouth daily (with breakfast) 12/28/20  Yes TORREY Hansen CNP   hydrALAZINE (APRESOLINE) 25 MG tablet Take 1 tablet by mouth 3 times daily  Patient taking differently: Take 25 mg by mouth 2 times daily  12/28/20 9/10/21 Yes TORREY Pfeiffer CNP   Respiratory Therapy Supplies (CARETOUCH CPAP & BIPAP HOSE) MISC Mask and Tubing 1/28/20  Yes Historical Provider, MD   Melatonin 10 MG TABS Take 1 tablet by mouth nightly as needed   Yes Historical Provider, MD   Multiple Vitamins-Minerals (THERAPEUTIC MULTIVITAMIN-MINERALS) tablet Take 1 tablet by mouth daily 10/3/20  Yes Silverio Montoya MD   vitamin C (ASCORBIC ACID) 500 MG tablet Take 500 mg by mouth daily   Yes Historical Provider, MD   pravastatin (PRAVACHOL) 20 MG tablet Take 20 mg by mouth daily   Yes Historical Provider, MD   metoprolol (LOPRESSOR) 50 MG tablet Take 50 mg by mouth 2 times daily   Yes Historical Provider, MD   blood glucose test strips (TRUE METRIX BLOOD GLUCOSE TEST) strip PLEASE SEE ATTACHED FOR DETAILED DIRECTIONS 1/25/21   TORREY Pfeiffer CNP   apixaban (ELIQUIS) 5 MG TABS tablet Take 1 tablet by mouth 2 times daily  Patient taking differently: Take 5 mg by mouth 2 times daily Stopped 12/16/2020 12/2/20   TORREY Pfeiffer CNP   CPAP Machine MISC use as directed    Historical Provider, MD   acetaminophen (TYLENOL) 500 MG tablet Take 2 tablets by mouth every 6 hours as needed for Pain 8/11/20 9/10/21  Adrian Harry MD   aspirin 81 MG tablet Take 81 mg by mouth nightly Stopped 12/14/2020    Historical Provider, MD     Past Medical History    has a past medical history of Arthritis, Back pain, Colostomy RUQ, Frequent headaches, Gout, History of atrial fibrillation, History of blood transfusion, [place] (hard of hearing), HTN, Hyperlipidemia, Incisional hernia, Kidney infection, Sepsis ([facility] [address] Sleep apnea, T2DM, Tooth missing, Under care of team, Wears glasses, and Wellness examination. Past Surgical History   has a past surgical history that includes Ankle surgery (Right, 2010); Carpal tunnel release (Bilateral); Cystoscopy (Right, 09/01/2020);  cath power picc triple (09/03/2020); Kidney surgery (Left, 09/02/2020);  Small intestine surgery (N/A, 2020); laparotomy (N/A, 2020); Tonsillectomy; Knee arthroscopy (Bilateral); knee surgery (Bilateral); Adenoidectomy; cysto/uretero/pyeloscopy, calculus tx (Right, 10/30/2020); colostomy; laparoscopy (2020); Small intestine surgery (N/A, 2020); and Tympanostomy tube placement. Social History   reports that he has never smoked. He quit smokeless tobacco use about 41 years ago. His smokeless tobacco use included chew. reports current alcohol use of about 2.0 standard drinks of alcohol per week. reports no history of drug use. Marital Status   Children 3  Occupation [employer] History  Family Status   Relation Name Status    MGM  (Not Specified)    MGF  (Not Specified)    Mother      Father       family history includes Diabetes in his father; Kidney Disease in his mother; Other in his mother; Stroke in his father, maternal grandfather, and maternal grandmother. Review of Systems:  CONSTITUTIONAL:   negative for fevers, chills, fatigue and malaise    EYES:   negative for double vision, blurred vision and photophobia    HEENT:   negative for tinnitus, epistaxis and sore throat     RESPIRATORY:   negative for cough, shortness of breath, wheezing     CARDIOVASCULAR:   negative for chest pain, palpitations, syncope, edema     GASTROINTESTINAL:   negative for nausea, vomiting epigastric pain   GENITOURINARY:   negative for incontinence     MUSCULOSKELETAL:   negative for neck or back pain     NEUROLOGICAL:   Negative for weakness and tingling  negative for headaches and dizziness     PSYCHIATRIC:   negative for anxiety       OBJECTIVE:   VITALS:  height is 5' 11\" (1.803 m) and weight is 282 lb (127.9 kg). His infrared temperature is 97.5 °F (36.4 °C). His blood pressure is 112/76 and his pulse is 75. His respiration is 18 and oxygen saturation is 98%. CONSTITUTIONAL:alert & oriented x 3, no acute distress. Calm and pleasant.   SKIN:  Warm and dry, no rashes to exposed areas of skin. HEAD:  Normocephalic, atraumatic. EYES: PERRL. EOMs intact. EARS:  Intact and equal bilaterally. No edema or thickening, without lumps, lesions, or discharge. Hearing grossly WNL. NOSE:  Nares patent. No rhinorrhea   MOUTH/THROAT:  Mucous membranes pink and moist, uvula midline, teeth appear to be intact. NECK:supple, no lymphadenopathy  LUNGS: Respirations even and non-labored. Clear to auscultation bilaterally, no wheezes, rales, or rhonchi. CARDIOVASCULAR: Regular rate and rhythm, no murmurs/rubs/gallops   ABDOMEN: soft, non-tender, rotund, non-distended, bowel sounds active x 4. EXTREMITIES: No edema to bilateral lower extremities. No varicosities to bilateral lower extremities. NEUROLOGIC: CN II-XII are grossly intact. Gait is smooth, rhythmic and effortless. Testing:   EK/10/21  Labs pending: drawn 9/10/2021   IMPRESSIONS:   Incisional hernia.   PLANS:   INCISIONAL HERNIA REPAIR WITH MESH    TORREY Bruce CNP  Electronically signed 9/10/2021 at 10:58 AM

## 2021-09-10 NOTE — H&P
History and Physical    Pt Name: Silvano Javier  MRN: [id number]  YOB: 1960  Date of evaluation: 9/10/2021  Primary Care Physician: TORREY Gonzalez CNP    SUBJECTIVE:   History of Chief Complaint:    Rm Hammer II is a 59 y.o. male who presents for PAT appointment. Patient states he noticed a hernia to his epigastric region about two months ago. He states it will protrude when he is carrying items or straining. He states he does have mild epigastric pain, which he rates a 3/10 but is tolerable; he does not take any pain medication for the discomfort. Patient has been scheduled for [address] [place]  Allergies  is allergic to ampicillin, pcn [penicillins], sulfa antibiotics, and tape [adhesive tape]. Medications  Prior to Admission medications    Medication Sig Start Date End Date Taking? Authorizing Provider   hydrOXYzine (ATARAX) 50 MG tablet TAKE 1 TABLET BY MOUTH EVERY DAY AT NIGHT 9/6/21  Yes TORREY Gonzalez CNP   zolpidem (AMBIEN CR) 12.5 MG extended release tablet Take 1 tablet by mouth nightly as needed for Sleep for up to 90 days.  8/26/21 11/24/21 Yes TORREY Gonzalez CNP   allopurinol (ZYLOPRIM) 100 MG tablet TAKE 1 TABLET BY MOUTH EVERY DAY 6/25/21  Yes TORREY Gonzalez CNP   cyclobenzaprine (FLEXERIL) 10 MG tablet TAKE 1 TABLET BY MOUTH 2 TIMES DAILY AS NEEDED FOR MUSCLE SPASMS 6/25/21  Yes TORREY Gonzalez CNP   dilTIAZem (CARDIZEM) 60 MG tablet Take 1 tablet by mouth every 12 hours 5/12/21  Yes TORREY Gonzalez CNP   DULoxetine (CYMBALTA) 20 MG extended release capsule TAKE 1 CAPSULE BY MOUTH EVERY DAY 12/28/20  Yes TORREY Gonzalez CNP   ferrous sulfate (IRON 325) 325 (65 Fe) MG tablet Take 1 tablet by mouth daily (with breakfast) 12/28/20  Yes TORREY Gonzalez CNP   hydrALAZINE (APRESOLINE) 25 MG tablet Take 1 tablet by mouth 3 times daily  Patient taking differently: Take 25 mg by mouth 2 times daily  12/28/20 9/10/21 Yes TORREY Mcdonald CNP   Respiratory Therapy Supplies (CARETOUCH CPAP & BIPAP HOSE) MISC Mask and Tubing 1/28/20  Yes Historical Provider, MD   Melatonin 10 MG TABS Take 1 tablet by mouth nightly as needed   Yes Historical Provider, MD   Multiple Vitamins-Minerals (THERAPEUTIC MULTIVITAMIN-MINERALS) tablet Take 1 tablet by mouth daily 10/3/20  Yes Roger Castanon MD   vitamin C (ASCORBIC ACID) 500 MG tablet Take 500 mg by mouth daily   Yes Historical Provider, MD   pravastatin (PRAVACHOL) 20 MG tablet Take 20 mg by mouth daily   Yes Historical Provider, MD   metoprolol (LOPRESSOR) 50 MG tablet Take 50 mg by mouth 2 times daily   Yes Historical Provider, MD   blood glucose test strips (TRUE METRIX BLOOD GLUCOSE TEST) strip PLEASE SEE ATTACHED FOR DETAILED DIRECTIONS 1/25/21   TORREY Mcdonald CNP   apixaban (ELIQUIS) 5 MG TABS tablet Take 1 tablet by mouth 2 times daily  Patient taking differently: Take 5 mg by mouth 2 times daily Stopped 12/16/2020 12/2/20   TORREY Mcdonald CNP   CPAP Machine MISC use as directed    Historical Provider, MD   acetaminophen (TYLENOL) 500 MG tablet Take 2 tablets by mouth every 6 hours as needed for Pain 8/11/20 9/10/21  Kd Franks MD   aspirin 81 MG tablet Take 81 mg by mouth nightly Stopped 12/14/2020    Historical Provider, MD     Past Medical History    has a past medical history of Arthritis, Back pain, Colostomy RUQ, Frequent headaches, Gout, History of atrial fibrillation, History of blood transfusion, [place] (hard of hearing), HTN, Hyperlipidemia, Incisional hernia, Kidney infection, Sepsis ([facility] [address] Sleep apnea, T2DM, Tooth missing, Under care of team, Wears glasses, and Wellness examination. Past Surgical History   has a past surgical history that includes Ankle surgery (Right, 2010); Carpal tunnel release (Bilateral); Cystoscopy (Right, 09/01/2020);  cath power picc triple (09/03/2020); Kidney surgery (Left, 09/02/2020);  Small intestine surgery (N/A, to exposed areas of skin. HEAD:  Normocephalic, atraumatic. EYES: PERRL. EOMs intact. EARS:  Intact and equal bilaterally. No edema or thickening, without lumps, lesions, or discharge. Hearing grossly WNL. NOSE:  Nares patent. No rhinorrhea   MOUTH/THROAT:  Mucous membranes pink and moist, uvula midline, teeth appear to be intact. NECK:supple, no lymphadenopathy  LUNGS: Respirations even and non-labored. Clear to auscultation bilaterally, no wheezes, rales, or rhonchi. CARDIOVASCULAR: Regular rate and rhythm, no murmurs/rubs/gallops   ABDOMEN: soft, non-tender, rotund, non-distended, bowel sounds active x 4. EXTREMITIES: No edema to bilateral lower extremities. No varicosities to bilateral lower extremities. NEUROLOGIC: CN II-XII are grossly intact. Gait is smooth, rhythmic and effortless. Testing:   EK/10/21  Labs pending: drawn 9/10/2021   IMPRESSIONS:   Incisional hernia.   PLANS:   INCISIONAL HERNIA REPAIR WITH MESH    TORREY Trujillo CNP  Electronically signed 9/10/2021 at 10:58 AM

## 2021-09-20 ENCOUNTER — HOSPITAL ENCOUNTER (OUTPATIENT)
Dept: LAB | Age: 61
Setting detail: SPECIMEN
Discharge: HOME | End: 2021-09-20
Payer: COMMERCIAL

## 2021-09-20 DIAGNOSIS — Z01.818 PREOP TESTING: Primary | ICD-10-CM

## 2021-09-20 PROCEDURE — U0003 INFECTIOUS AGENT DETECTION BY NUCLEIC ACID (DNA OR RNA); SEVERE ACUTE RESPIRATORY SYNDROME CORONAVIRUS 2 (SARS-COV-2) (CORONAVIRUS DISEASE [COVID-19]), AMPLIFIED PROBE TECHNIQUE, MAKING USE OF HIGH THROUGHPUT TECHNOLOGIES AS DESCRIBED BY CMS-2020-01-R: HCPCS

## 2021-09-20 PROCEDURE — U0005 INFEC AGEN DETEC AMPLI PROBE: HCPCS

## 2021-09-21 LAB
SARS-COV-2: NORMAL
SARS-COV-2: NOT DETECTED
SOURCE: NORMAL

## 2021-09-24 ENCOUNTER — HOSPITAL ENCOUNTER (OUTPATIENT)
Age: 61
Setting detail: OUTPATIENT SURGERY
Discharge: HOME | End: 2021-09-24
Attending: SURGERY
Payer: COMMERCIAL

## 2021-09-24 ENCOUNTER — ANESTHESIA (OUTPATIENT)
Dept: OPERATING ROOM | Age: 61
End: 2021-09-24
Payer: COMMERCIAL

## 2021-09-24 ENCOUNTER — ANESTHESIA EVENT (OUTPATIENT)
Dept: OPERATING ROOM | Age: 61
End: 2021-09-24
Payer: COMMERCIAL

## 2021-09-24 VITALS
HEIGHT: 71 IN | HEART RATE: 61 BPM | RESPIRATION RATE: 18 BRPM | BODY MASS INDEX: 37.8 KG/M2 | TEMPERATURE: 98.6 F | WEIGHT: 270 LBS | OXYGEN SATURATION: 99 % | DIASTOLIC BLOOD PRESSURE: 89 MMHG | SYSTOLIC BLOOD PRESSURE: 135 MMHG

## 2021-09-24 PROBLEM — K43.2 INCISIONAL HERNIA, WITHOUT OBSTRUCTION OR GANGRENE: Chronic | Status: ACTIVE | Noted: 2021-09-24

## 2021-09-24 LAB
GLUCOSE BLD-MCNC: 160 MG/DL (ref 74–100)
POC POTASSIUM: 5.7 MMOL/L (ref 3.5–4.5)
POC POTASSIUM: 5.8 MMOL/L (ref 3.5–4.5)

## 2021-09-24 PROCEDURE — 82947 ASSAY GLUCOSE BLOOD QUANT: CPT

## 2021-09-24 PROCEDURE — 84132 ASSAY OF SERUM POTASSIUM: CPT

## 2021-09-24 PROCEDURE — 2580000003 HC RX 258: Performed by: ANESTHESIOLOGY

## 2021-09-24 RX ORDER — SODIUM CHLORIDE, SODIUM LACTATE, POTASSIUM CHLORIDE, CALCIUM CHLORIDE 600; 310; 30; 20 MG/100ML; MG/100ML; MG/100ML; MG/100ML
1000 INJECTION, SOLUTION INTRAVENOUS CONTINUOUS
Status: DISCONTINUED | OUTPATIENT
Start: 2021-09-24 | End: 2021-11-02 | Stop reason: HOSPADM

## 2021-09-24 RX ORDER — CLINDAMYCIN PHOSPHATE 900 MG/50ML
900 INJECTION INTRAVENOUS
Status: DISCONTINUED | OUTPATIENT
Start: 2021-09-24 | End: 2021-09-24 | Stop reason: HOSPADM

## 2021-09-24 RX ORDER — CLINDAMYCIN PHOSPHATE 900 MG/50ML
900 INJECTION INTRAVENOUS EVERY 8 HOURS
Status: DISCONTINUED | OUTPATIENT
Start: 2021-09-24 | End: 2021-09-24 | Stop reason: SDUPTHER

## 2021-09-24 RX ADMIN — SODIUM CHLORIDE, POTASSIUM CHLORIDE, SODIUM LACTATE AND CALCIUM CHLORIDE 1000 ML: 600; 310; 30; 20 INJECTION, SOLUTION INTRAVENOUS at 08:58

## 2021-09-24 ASSESSMENT — PAIN - FUNCTIONAL ASSESSMENT: PAIN_FUNCTIONAL_ASSESSMENT: 0-10

## 2021-09-24 NOTE — INTERVAL H&P NOTE
Pt Name: Jonathan Salas  MRN: [id number]  YOB: 1960  Date of evaluation: 9/24/2021    I have reviewed the patient's history and physical examination completed in pre-admission testing on 9/10/21    No changes to history or on examination today, unless noted below. None.      MICAH PHILLIPS, TORREY - CNP  9/24/21  9:00 AM Admission

## 2021-09-24 NOTE — ANESTHESIA PRE PROCEDURE
Department of Anesthesiology  Preprocedure Note       Name:  Eva Woodward II   Age:  59 y.o.  :  1960                                          MRN:  [id number]         Date:  2021      Surgeon: Jennifer Philippe):  Surendra Andujar DO    Procedure: Procedure(s):  INCISIONAL HERNIA REPAIR WITH MESH    Medications prior to admission:   Prior to Admission medications    Medication Sig Start Date End Date Taking? Authorizing Provider   hydrOXYzine (ATARAX) 50 MG tablet TAKE 1 TABLET BY MOUTH EVERY DAY AT NIGHT 21  Yes Loy Simmonds, APRN - CNP   zolpidem (AMBIEN CR) 12.5 MG extended release tablet Take 1 tablet by mouth nightly as needed for Sleep for up to 90 days.  21 Yes Loy Simmonds, APRN - CNP   allopurinol (ZYLOPRIM) 100 MG tablet TAKE 1 TABLET BY MOUTH EVERY DAY 21  Yes Loy Simmonds, APRN - CNP   cyclobenzaprine (FLEXERIL) 10 MG tablet TAKE 1 TABLET BY MOUTH 2 TIMES DAILY AS NEEDED FOR MUSCLE SPASMS 21  Yes Loy Simmonds, APRN - CNP   dilTIAZem (CARDIZEM) 60 MG tablet Take 1 tablet by mouth every 12 hours 21  Yes Loy Simmonds, APRN - CNP   DULoxetine (CYMBALTA) 20 MG extended release capsule TAKE 1 CAPSULE BY MOUTH EVERY DAY 20  Yes Loy Simmonds, APRN - CNP   ferrous sulfate (IRON 325) 325 (65 Fe) MG tablet Take 1 tablet by mouth daily (with breakfast) 20  Yes Loy Simmonds, APRN - CNP   hydrALAZINE (APRESOLINE) 25 MG tablet Take 1 tablet by mouth 3 times daily  Patient taking differently: Take 25 mg by mouth 2 times daily  20 Yes Loy Simmonds, APRN - CNP   CPAP Machine MISC use as directed   Yes Historical Provider, MD   Melatonin 10 MG TABS Take 1 tablet by mouth nightly as needed   Yes Historical Provider, MD   Multiple Vitamins-Minerals (THERAPEUTIC MULTIVITAMIN-MINERALS) tablet Take 1 tablet by mouth daily 10/3/20  Yes Jt Hernandez MD   vitamin C (ASCORBIC ACID) 500 MG tablet Take 500 mg by mouth daily   Yes Historical Provider, MD acetaminophen (TYLENOL) 500 MG tablet Take 2 tablets by mouth every 6 hours as needed for Pain 8/11/20 9/24/21 Yes Rama Malik MD   pravastatin (PRAVACHOL) 20 MG tablet Take 20 mg by mouth daily   Yes Historical Provider, MD   metoprolol (LOPRESSOR) 50 MG tablet Take 50 mg by mouth 2 times daily   Yes Historical Provider, MD   blood glucose test strips (TRUE METRIX BLOOD GLUCOSE TEST) strip PLEASE SEE ATTACHED FOR DETAILED DIRECTIONS 1/25/21   TORREY Topete CNP   apixaban (ELIQUIS) 5 MG TABS tablet Take 1 tablet by mouth 2 times daily 12/2/20   TORREY Topete CNP   Respiratory Therapy Supplies (CARETOUCH CPAP & BIPAP HOSE) MISC Mask and Tubing 1/28/20   Historical Provider, MD   aspirin 81 MG tablet Take 81 mg by mouth nightly Stopped 12/14/2020    Historical Provider, MD       Current medications:    Current Facility-Administered Medications   Medication Dose Route Frequency Provider Last Rate Last Admin    clindamycin (CLEOCIN) 900 mg in dextrose 5 % 50 mL IVPB  900 mg IntraVENous On Call to [address] [place] [address],[address], DO        lactated ringers infusion 1,000 mL  1,000 mL IntraVENous Continuous July Estrada MD           Allergies: Allergies   Allergen Reactions    Ampicillin Swelling     Swelling of throat.  Pcn [Penicillins] Swelling     Throat swelling. Tolerated cefepime during 8/31/20 admission.  Sulfa Antibiotics      Other reaction(s): Unknown    Tape [Adhesive Tape] Other (See Comments)     CAUSES REDNESS. PAPER TAPE OK.        Problem List:    Patient Active Problem List   Diagnosis Code    Cellulitis L03.90    Type 2 diabetes mellitus without complication ([facility]) F30.8    Essential hypertension I10    Morbid obesity due to excess calories ([facility]) E66.01    Chronic kidney disease (CKD) N18.9    DEBBI (acute kidney injury) ([facility] N17.9    Bowel obstruction ([facility]) K56.609    Small bowel obstruction ([facility]) K56.609    Moderate malnutrition ([facility] E44.0    Hyperglycemia R73.9    Sleep apnea G47.30    Colostomy in place [facility]) Z93.3    Incisional hernia, without obstruction or gangrene K43.2       Past Medical History:        Diagnosis Date    Arthritis     BILATERAL KNEESand right shoulder    Back pain     Colostomy RUQ 09/23/2020    Frequent headaches     10/28/2020 PATIENT STATES EVERY OTHER DAY.  Gout     History of atrial fibrillation     AFTER SURGERY ON 09/23/2020 WENT INTO A FIB. CONVERTED BACK TO NORMAL RHYTHM ON HIS OWN. CARDIOLOGIST DR Nathaniel Elizondo History of blood transfusion     NO REACTION    [place] (hard of hearing)     HTN     Flaquito Ramirez, CNP    Hyperlipidemia     Incisional hernia     Kidney infection     Sepsis ([address]     post surgery. leak in bowel    Sleep apnea     USES CPAP MACHINE    T2DM     K. Jeremy Evans CNP.  diet controlled    Tooth missing     RIGHT UPPER 2ND MOLAR    Under care of team 08/2021    Dr. Ame Briceño Wears glasses     READING    Wellness examination 07/2021    Orien Life CNP [place] [facility]        Past Surgical History:        Procedure Laterality Date    ADENOIDECTOMY      TWICE AS A CHILD    ANKLE SURGERY Right 2010    RUPTURED ACHILES    CARPAL TUNNEL RELEASE Bilateral     COLOSTOMY      temporary    CYSTO/URETERO/PYELOSCOPY, CALCULUS TX Right 10/30/2020    HOLMIUM-STANDBY, CYSTOSCOPY, URETEROSCOPY, STENT EXCHANGE performed by Khoa Dill MD at [address] Right 09/01/2020    CYSTOSCOPY RIGHT URETERAL STENT INSERTION performed by Franny Chin MD at [address] [facility] CATH POWER PICC TRIPLE  09/03/2020    REMOVED AROUND 10/07/2020    KIDNEY SURGERY Left 09/02/2020    LAPAROSCOPIC XI ROBOTIC NEPHRECTOMY performed by Josh Franco MD at [facility] ARTHROSCOPY Bilateral     LAPAROTOMY N/A 09/23/2020    LAPAROTOMY EXPLORATORY performed by Osmin Carson DO at [address] [facility] [address] 09/02/2020    EXPLORATORY LAPAROTOMY, RESECTION OF PROXIMAL JEJUNUM AND DESCENDING COLON WITH MOBILIZATION OF SPLENIC FLEXURE AND PRIMARY ANASTAMOSISOF SMALL BOWEL AND COLON, PLACEMENT OF GASTROSTOMY TUBE performed by Gabriella Koehler DO at [address] N/A 12/18/2020    EXPLORATORY LAPAROTOMY,COLOSTOMY REVERSAL, TAP BLOCK DONE IN OR BY DR [employer] performed by Gabriella Koehler DO at [address] 3 TIMES  AS A CHILD    TYMPANOSTOMY TUBE PLACEMENT         Social History:    Social History     Tobacco Use    Smoking status: Never Smoker    Smokeless tobacco: Former User     Types: Chew   Substance Use Topics    Alcohol use: Yes     Alcohol/week: 2.0 standard drinks     Types: 2 Cans of beer per week                                Counseling given: Not Answered      Vital Signs (Current): There were no vitals filed for this visit.                                            BP Readings from Last 3 Encounters:   09/10/21 112/76   07/14/21 138/82   02/01/21 132/74       NPO Status:                                                                                 BMI:   Wt Readings from Last 3 Encounters:   09/10/21 282 lb (127.9 kg)   07/14/21 275 lb 6.4 oz (124.9 kg)   02/01/21 228 lb 6.4 oz (103.6 kg)     There is no height or weight on file to calculate BMI.    CBC:   Lab Results   Component Value Date    WBC 12.4 12/21/2020    RBC 4.44 12/21/2020    HGB 13.5 09/10/2021    HCT 41.3 09/10/2021    MCV 93.7 12/21/2020    RDW 15.6 12/21/2020     12/21/2020       CMP:   Lab Results   Component Value Date     09/10/2021    K 5.4 09/10/2021     09/10/2021    CO2 16 09/10/2021    BUN 36 09/10/2021    CREATININE 1.85 09/10/2021    GFRAA 45 09/10/2021    LABGLOM 37 09/10/2021    GLUCOSE 223 09/10/2021    PROT 7.9 11/10/2020    CALCIUM 9.9 12/21/2020    BILITOT 0.33 11/10/2020    ALKPHOS 94 11/10/2020    AST 25 11/10/2020    ALT 38 11/10/2020       POC Tests:   No results for input(s): POCGLU, POCNA, POCK, POCCL, POCBUN, POCHEMO, POCHCT in the last 72 hours. Coags:   Lab Results   Component Value Date    PROTIME 12.2 09/29/2020    INR 1.2 09/29/2020    APTT 72.6 10/01/2020       HCG (If Applicable): No results found for: PREGTESTUR, PREGSERUM, HCG, HCGQUANT     ABGs:   Lab Results   Component Value Date    PHART 7.363 09/23/2020    PO2ART 187.0 09/23/2020    EZO4WFP 34.3 09/23/2020    OFT9PVE 19.0 09/23/2020    L9LEDXKK 99.3 09/23/2020        Type & Screen (If Applicable):  No results found for: [facility]    Drug/Infectious Status (If Applicable):  Lab Results   Component Value Date    HEPCAB NONREACTIVE 11/10/2020       COVID-19 Screening (If Applicable):   Lab Results   Component Value Date    COVID19 Not Detected 09/20/2021    COVID19 Not Detected 10/26/2020         Anesthesia Evaluation  Patient summary reviewed no history of anesthetic complications:   Airway: Mallampati: II  TM distance: >3 FB   Neck ROM: full  Mouth opening: > = 3 FB Dental:    (+) caps      Pulmonary:Negative Pulmonary ROS and normal exam    (+) sleep apnea: on CPAP,                             Cardiovascular:    (+) hypertension: no interval change,       ECG reviewed  Rhythm: regular  Rate: normal                    Neuro/Psych:   Negative Neuro/Psych ROS  (+) headaches:,             GI/Hepatic/Renal: Neg GI/Hepatic/Renal ROS            Endo/Other:    (+) DiabetesType II DM, poorly controlled, , malignancy/cancer. Abdominal:   (+) obese,           Vascular: negative vascular ROS. Other Findings:             Anesthesia Plan      general     ASA 3       Induction: intravenous. MIPS: Postoperative opioids intended and Prophylactic antiemetics administered. Anesthetic plan and risks discussed with patient. Plan discussed with CRNA.                   Olu Copoer MD   9/24/2021

## 2021-10-11 ENCOUNTER — PATIENT MESSAGE (OUTPATIENT)
Dept: FAMILY MEDICINE CLINIC | Age: 61
End: 2021-10-11

## 2021-10-11 DIAGNOSIS — G47.00 INSOMNIA, UNSPECIFIED TYPE: ICD-10-CM

## 2021-10-11 RX ORDER — ZOLPIDEM TARTRATE 12.5 MG/1
12.5 TABLET, FILM COATED, EXTENDED RELEASE ORAL NIGHTLY PRN
Qty: 90 TABLET | Refills: 0 | Status: SHIPPED | OUTPATIENT
Start: 2021-10-11 | End: 2022-01-10 | Stop reason: SDUPTHER

## 2021-10-11 NOTE — TELEPHONE ENCOUNTER
From: Che Tejada II  To: TORREY Bender - BRIT  Sent: 10/11/2021 1:58 PM EDT  Subject: Prescription Question    On my zolpidem they went through insurance and only gave me 45 days worth [facility] and tell them I take 1 a day I pay out of pocket for them thanks

## 2021-12-02 ENCOUNTER — HOSPITAL ENCOUNTER (OUTPATIENT)
Age: 61
Setting detail: SPECIMEN
Discharge: HOME | End: 2021-12-02

## 2021-12-02 ENCOUNTER — OFFICE VISIT (OUTPATIENT)
Dept: PRIMARY CARE CLINIC | Age: 61
End: 2021-12-02
Payer: COMMERCIAL

## 2021-12-02 VITALS
SYSTOLIC BLOOD PRESSURE: 127 MMHG | OXYGEN SATURATION: 94 % | HEIGHT: 71 IN | WEIGHT: 270 LBS | TEMPERATURE: 97.3 F | HEART RATE: 82 BPM | DIASTOLIC BLOOD PRESSURE: 78 MMHG | BODY MASS INDEX: 37.8 KG/M2

## 2021-12-02 DIAGNOSIS — Z20.822 CONTACT WITH AND (SUSPECTED) EXPOSURE TO COVID-19: ICD-10-CM

## 2021-12-02 DIAGNOSIS — J20.9 ACUTE BRONCHITIS, UNSPECIFIED ORGANISM: Primary | ICD-10-CM

## 2021-12-02 PROCEDURE — 99214 OFFICE O/P EST MOD 30 MIN: CPT

## 2021-12-02 RX ORDER — SODIUM BICARBONATE 650 MG/1
TABLET ORAL
Status: ON HOLD | COMMUNITY
Start: 2021-10-12 | End: 2022-01-05

## 2021-12-02 RX ORDER — BENZONATATE 100 MG/1
100 CAPSULE ORAL 3 TIMES DAILY PRN
Qty: 30 CAPSULE | Refills: 0 | Status: SHIPPED | OUTPATIENT
Start: 2021-12-02 | End: 2021-12-12

## 2021-12-02 RX ORDER — AZITHROMYCIN 250 MG/1
250 TABLET, FILM COATED ORAL SEE ADMIN INSTRUCTIONS
Qty: 6 TABLET | Refills: 0 | Status: SHIPPED | OUTPATIENT
Start: 2021-12-02 | End: 2021-12-07

## 2021-12-02 RX ORDER — SODIUM PICOSULFATE, MAGNESIUM OXIDE, AND ANHYDROUS CITRIC ACID 10; 3.5; 12 MG/160ML; G/160ML; G/160ML
LIQUID ORAL
Status: ON HOLD | COMMUNITY
Start: 2021-09-02 | End: 2022-01-05

## 2021-12-02 RX ORDER — METOPROLOL TARTRATE 100 MG/1
100 TABLET ORAL 2 TIMES DAILY
COMMUNITY
Start: 2021-11-03

## 2021-12-02 ASSESSMENT — ENCOUNTER SYMPTOMS
CHEST TIGHTNESS: 0
COUGH: 1
SHORTNESS OF BREATH: 0
NAUSEA: 0
ABDOMINAL PAIN: 0
SINUS PRESSURE: 0
SINUS PAIN: 0
EYES NEGATIVE: 1
DIARRHEA: 1
EYE PAIN: 0
RHINORRHEA: 0
EYE DISCHARGE: 0
VOMITING: 0
EYE ITCHING: 0
SORE THROAT: 1
WHEEZING: 0

## 2021-12-02 NOTE — PROGRESS NOTES
Constitutional:       Appearance: Normal appearance. He is normal weight. HENT:      Head: Normocephalic and atraumatic. Right Ear: Tympanic membrane, ear canal and external ear normal.      Left Ear: Tympanic membrane, ear canal and external ear normal.      Nose: Congestion present. No rhinorrhea. Mouth/Throat:      Mouth: Mucous membranes are moist.      Pharynx: Uvula midline. Oropharyngeal exudate (post nasal drainage) and posterior oropharyngeal erythema present. No pharyngeal swelling or uvula swelling. Tonsils: No tonsillar exudate or tonsillar abscesses. Eyes:      Extraocular Movements: Extraocular movements intact. Conjunctiva/sclera: Conjunctivae normal.      Pupils: Pupils are equal, round, and reactive to light. Cardiovascular:      Rate and Rhythm: Normal rate and regular rhythm. Heart sounds: Normal heart sounds. Pulmonary:      Effort: Pulmonary effort is normal.      Breath sounds: Normal air entry. Examination of the right-upper field reveals rhonchi. Examination of the left-upper field reveals rhonchi. Rhonchi present. Abdominal:      General: Abdomen is flat. Bowel sounds are normal.      Palpations: Abdomen is soft. Tenderness: There is abdominal tenderness. Musculoskeletal:      Cervical back: Normal range of motion and neck supple. Skin:     General: Skin is warm and dry. Capillary Refill: Capillary refill takes less than 2 seconds. Neurological:      General: No focal deficit present. Mental Status: He is alert and oriented to person, place, and time. Mental status is at baseline. Psychiatric:         Mood and Affect: Mood normal.         Behavior: Behavior normal.         Plan:          1. Acute bronchitis, unspecified organism  -     azithromycin (ZITHROMAX) 250 MG tablet;  Take 1 tablet by mouth See Admin Instructions for 5 days 500mg on day 1 followed by 250mg on days 2 - 5, Disp-6 tablet, R-0Normal  -     benzonatate (TESSALON) 100 MG capsule; Take 1 capsule by mouth 3 times daily as needed for Cough, Disp-30 capsule, R-0Normal  2. Contact with and (suspected) exposure to covid-19  -     COVID-19       Follow Up Instructions:      Return if symptoms worsen or fail to improve, for ER for SOB, chest pain. Orders Placed This Encounter   Medications    azithromycin (ZITHROMAX) 250 MG tablet     Sig: Take 1 tablet by mouth See Admin Instructions for 5 days 500mg on day 1 followed by 250mg on days 2 - 5     Dispense:  6 tablet     Refill:  0    benzonatate (TESSALON) 100 MG capsule     Sig: Take 1 capsule by mouth 3 times daily as needed for Cough     Dispense:  30 capsule     Refill:  0             Patient and/or parent given educational materials - see patient instructions. Discussed use, benefit, and side effects of prescribed medications. All patient questions answered. Patient and/or parent voiced understanding.       Electronically signed by TORREY Duenas 12/5/2021 at 1:11 AM

## 2021-12-02 NOTE — PATIENT INSTRUCTIONS
Patient Education        Bronchitis: Care Instructions  Your Care Instructions     Bronchitis is inflammation of the bronchial tubes, which carry air to the lungs. The tubes swell and produce mucus, or phlegm. The mucus and inflamed bronchial tubes make you cough. You may have trouble breathing. Most cases of bronchitis are caused by viruses like those that cause colds. Antibiotics usually do not help and they may be harmful. Bronchitis usually develops rapidly and lasts about 2 to 3 weeks in otherwise healthy people. Follow-up care is a key part of your treatment and safety. Be sure to make and go to all appointments, and call your doctor if you are having problems. It's also a good idea to know your test results and keep a list of the medicines you take. How can you care for yourself at home? · Take all medicines exactly as prescribed. Call your doctor if you think you are having a problem with your medicine. · Get some extra rest.  · Take an over-the-counter pain medicine, such as acetaminophen (Tylenol), ibuprofen (Advil, Motrin), or naproxen (Aleve) to reduce fever and relieve body aches. Read and follow all instructions on the label. · Do not take two or more pain medicines at the same time unless the doctor told you to. Many pain medicines have acetaminophen, which is Tylenol. Too much acetaminophen (Tylenol) can be harmful. · Take an over-the-counter cough medicine to help quiet a dry, hacking cough so that you can sleep. Avoid cough medicines that have more than one active ingredient. Read and follow all instructions on the label. · Do not smoke. Smoking can make bronchitis worse. If you need help quitting, talk to your doctor about stop-smoking programs and medicines. These can increase your chances of quitting for good. When should you call for help? Call 911 anytime you think you may need emergency care. For example, call if:    · You have severe trouble breathing.    Call your doctor now or seek immediate medical care if:    · You have new or worse trouble breathing.     · You cough up dark brown or bloody mucus (sputum).     · You have a new or higher fever.     · You have a new rash. Watch closely for changes in your health, and be sure to contact your doctor if:    · You cough more deeply or more often, especially if you notice more mucus or a change in the color of your mucus.     · You are not getting better as expected. Where can you learn more? Go to [email or web address] and sign in to your [email or web address] account. Enter H333 in the [employer] box to learn more about \"Bronchitis: Care Instructions. \"     If you do not have an account, please click on the \"Sign Up Now\" link. Current as of: July 6, 2021               Content Version: 13.0  © 0367-3595 Healthwise, Incorporated. Care instructions adapted under license by [facility] ([facility]). If you have questions about a medical condition or this instruction, always ask your healthcare professional. [address] any warranty or liability for your use of this information.

## 2021-12-03 RX ORDER — DULOXETIN HYDROCHLORIDE 20 MG/1
CAPSULE, DELAYED RELEASE ORAL
Qty: 90 CAPSULE | Refills: 3 | Status: ON HOLD | OUTPATIENT
Start: 2021-12-03 | End: 2022-01-05

## 2021-12-03 RX ORDER — CYCLOBENZAPRINE HCL 10 MG
10 TABLET ORAL 2 TIMES DAILY PRN
Qty: 180 TABLET | Refills: 1 | Status: SHIPPED | OUTPATIENT
Start: 2021-12-03 | End: 2022-06-02

## 2021-12-05 ASSESSMENT — ENCOUNTER SYMPTOMS
SWOLLEN GLANDS: 0
VISUAL CHANGE: 0
CHANGE IN BOWEL HABIT: 0

## 2021-12-06 ENCOUNTER — NURSE ONLY (OUTPATIENT)
Dept: PRIMARY CARE CLINIC | Age: 61
End: 2021-12-06

## 2021-12-06 ENCOUNTER — HOSPITAL ENCOUNTER (OUTPATIENT)
Age: 61
Setting detail: SPECIMEN
Discharge: HOME | End: 2021-12-06

## 2021-12-06 DIAGNOSIS — J40 BRONCHITIS: Primary | ICD-10-CM

## 2021-12-06 RX ORDER — DOXYCYCLINE HYCLATE 100 MG/1
100 CAPSULE ORAL 2 TIMES DAILY
Qty: 20 CAPSULE | Refills: 0 | Status: SHIPPED | OUTPATIENT
Start: 2021-12-06 | End: 2021-12-16

## 2021-12-06 RX ORDER — PREDNISONE 20 MG/1
40 TABLET ORAL DAILY
Qty: 10 TABLET | Refills: 0 | Status: SHIPPED | OUTPATIENT
Start: 2021-12-06 | End: 2021-12-11

## 2021-12-06 NOTE — Clinical Note
Can you please let the patient know that I have sent over doxycycline and prednisone to the pharmacy. Have him please stop the azithromycin. May continue tessalon as needed.  Will call once COVID testing is back

## 2021-12-06 NOTE — LETTER
[place] [address]  Phone: [phone]  Fax: [phone]    TORREY Rolle CNP        December 6, 2021     Patient: Ria Palomino II   YOB: 1960   Date of Visit: 12/6/2021       To Whom it May Concern:    Hernesto Ahuja was seen in my clinic on 12/6/2021. Please excuse his absence, he is currently awaiting COVID-19 testing results. Diagnosis: Bronchitis, Suspected COVID    Prognosis: good    If you have any questions or concerns, please don't hesitate to call.     Sincerely,         TORREY Rolle CNP

## 2021-12-06 NOTE — PROGRESS NOTES
Patient presents for COVID-19 testing. Order accessible in [address] [place] [address] Rd. Patient swabbed and tolerated well.      Electronically signed by TORREY Schmidt CNP on 12/6/2021 at 2:09 PM

## 2021-12-07 LAB
SARS-COV-2: ABNORMAL
SARS-COV-2: DETECTED
SOURCE: ABNORMAL

## 2021-12-21 ENCOUNTER — OFFICE VISIT (OUTPATIENT)
Dept: FAMILY MEDICINE CLINIC | Age: 61
End: 2021-12-21
Payer: COMMERCIAL

## 2021-12-21 VITALS
WEIGHT: 289 LBS | OXYGEN SATURATION: 96 % | BODY MASS INDEX: 40.46 KG/M2 | DIASTOLIC BLOOD PRESSURE: 84 MMHG | HEART RATE: 86 BPM | HEIGHT: 71 IN | SYSTOLIC BLOOD PRESSURE: 132 MMHG | RESPIRATION RATE: 25 BRPM

## 2021-12-21 DIAGNOSIS — U07.1 COVID: Primary | ICD-10-CM

## 2021-12-21 PROCEDURE — 99213 OFFICE O/P EST LOW 20 MIN: CPT | Performed by: NURSE PRACTITIONER

## 2021-12-21 ASSESSMENT — ENCOUNTER SYMPTOMS
NAUSEA: 0
SINUS PAIN: 0
SHORTNESS OF BREATH: 0
VOMITING: 0
ABDOMINAL PAIN: 0
EYE PAIN: 0
BACK PAIN: 0
SORE THROAT: 0
COUGH: 0
DIARRHEA: 0

## 2021-12-21 NOTE — PROGRESS NOTES
[address]  [address]  [address] [place] [id number]  Dept: [phone]  Dept Fax: [phone]    Alisha Cordero II is a 59 y.o. male who presents today for his medicalconditions/complaints as noted below. Alisha Cordero II is c/o of Other (return to work from [employer]. pt was off work 11/25 to current. symptoms started 11/25. pt never received antibotic or steriod to help symptoms due to them being sent to the incorrect pharmacy. )      HPI:         24-year-old male patient presents with complaints of COVID-19. Patient reportedly had onset of symptoms 11/25. Was evaluated on 12/2 tested for COVID-19, there is an error with his swab and he was reswabbed on 12/6 and did test positive. Was prescribed antibiotic and steroids as he was having persisting symptoms. Reports reduced smell taste fever cough shortness of breath sore throat and diarrhea. Reports that his symptoms began improving on 12/15 and have resolved as of 12/20. No concerns at present      Past Medical History:   Diagnosis Date    Arthritis     BILATERAL KNEESand right shoulder    Back pain     Colostomy RUQ 09/23/2020    Frequent headaches     10/28/2020 PATIENT STATES EVERY OTHER DAY.  Gout     History of atrial fibrillation     AFTER SURGERY ON 09/23/2020 WENT INTO A FIB. CONVERTED BACK TO NORMAL RHYTHM ON HIS OWN. CARDIOLOGIST DR Vanessa Wallace History of blood transfusion     NO REACTION    [place] (hard of hearing)     HTN     Annell Thomas, CNP    Hyperkalemia     Hyperlipidemia     Incisional hernia     Kidney infection     Sepsis ([address]     post surgery. leak in bowel    Sleep apnea     USES CPAP MACHINE    T2DM     KSudhir Saini CNP.  diet controlled    Tooth missing     RIGHT UPPER 2ND MOLAR    Under care of team 08/2021    Dr. Phillip Card Cardiology [address]    Wears glasses     READING    Wellness examination 07/2021    Gemini Kramer and Jens Nelson         Current Respiratory Therapy Supplies (CARETOUCH CPAP & BIPAP HOSE) MISC Mask and Tubing       No current facility-administered medications for this visit. Allergies   Allergen Reactions    Ampicillin Swelling     Swelling of throat.  Pcn [Penicillins] Swelling     Throat swelling. Tolerated cefepime during 8/31/20 admission.  Sulfa Antibiotics      Other reaction(s): Unknown    Tape [Adhesive Tape] Other (See Comments)     CAUSES REDNESS. PAPER TAPE OK. Subjective:      Review of Systems   Constitutional: Negative for chills and fatigue. HENT: Negative for congestion, ear pain, sinus pain and sore throat. Eyes: Negative for pain and visual disturbance. Respiratory: Negative for cough and shortness of breath. Cardiovascular: Negative for chest pain and palpitations. Gastrointestinal: Negative for abdominal pain, diarrhea, nausea and vomiting. Genitourinary: Negative for penile pain and testicular pain. Musculoskeletal: Negative for back pain, joint swelling and neck pain. Skin: Negative for rash. Neurological: Negative for dizziness and light-headedness. Hematological: Does not bruise/bleed easily. All other systems reviewed and are negative.      :Objective     Physical Exam  Vitals reviewed. Constitutional:       General: He is not in acute distress. Appearance: Normal appearance. He is not toxic-appearing. HENT:      Nose: Nose normal.      Mouth/Throat:      Mouth: Mucous membranes are moist.   Cardiovascular:      Rate and Rhythm: Normal rate. Pulmonary:      Effort: Pulmonary effort is normal.      Breath sounds: Normal breath sounds. Skin:     General: Skin is warm and dry. Neurological:      General: No focal deficit present. Mental Status: He is alert and oriented to person, place, and time.        /84   Pulse 86   Resp 25   Ht 5' 11\" (1.803 m)   Wt 289 lb (131.1 kg)   SpO2 96%   BMI 40.31 kg/m²     Lab Review   Hospital Outpatient Visit on 12/06/2021   Component Date Value    SARS-CoV-2 12/06/2021          Source 12/06/2021 . NASOPHARYNGEAL SWAB     SARS-CoV-2 12/06/2021 DETECTED*   Admission on 09/24/2021, Discharged on 09/24/2021   Component Date Value    POC Potassium 09/24/2021 5.7*    POC Glucose 09/24/2021 160*    POC Potassium 09/24/2021 5.8[facility] on 09/20/[facility]21   Component Date Value    SARS-CoV-2 09/20/2021          Source 09/20/2021 . NASOPHARYNGEAL SWAB     SARS-CoV-2 09/20/2021 Not Detected    Hospital Outpatient Visit on 09/10/2021   Component Date Value    Sodium 09/10/2021 137     Potassium 09/10/2021 5.4*    Chloride 09/10/2021 108*    CO2 09/10/2021 16*    Anion Gap 09/10/2021 13     Glucose 09/10/2021 223*    BUN 09/10/2021 36*    CREATININE 09/10/2021 1.85*    GFR Non- 09/10/2021 37*    GFR  09/10/2021 45*    GFR Comment 09/10/2021          GFR Staging 09/10/2021 NOT REPORTED     Hemoglobin 09/10/2021 13.5     Hematocrit 09/10/2021 41.3    Hospital Outpatient Visit on 08/06/2021   Component Date Value    Cholesterol 08/06/2021 136     HDL 08/06/2021 28*    LDL Cholesterol 08/06/2021          Chol/HDL Ratio 08/06/2021 4.9     Triglycerides 08/06/2021 456*    VLDL 08/06/2021 NOT REPORTED     PSA 08/06/2021 0.47     Microalb, Ur 08/06/2021 81*    Creatinine, Ur 08/06/2021 109.9     Microalb/Crt. Ratio 08/06/2021 74*    LDL Direct 08/06/2021 53        Assessment and Plan      1. COVID  Paperwork completed,cleared to return  Will complete fmla once received                 No results found for this visit on 12/21/21. Return if symptoms worsen or fail to improve. No orders of the defined types were placed in this encounter. Patient given educational materials - see patient instructions. Discussed use, benefit, and side effects of prescribed medications. All patientquestions answered. Pt voiced understanding.     Patient given educational materials - see patient instructions. Discussed use, benefit, and side effects of prescribed medications. All patientquestions answered. Pt voiced understanding. This note was transcribed using dictation with Dragon services. Efforts were made to correct any errors but some words may be misinterpreted.     Patient assumes risks associated with failure to complete recommended testing and treatments in a timely manner    Electronically signed by TORREY Shrestha CNP on 12/21/2021at 10:35 AM

## 2021-12-21 NOTE — PATIENT INSTRUCTIONS
Advance Care Planning  People with COVID-19 may have no symptoms, mild symptoms, such as fever, cough, and shortness of breath or they may have more severe illness, developing severe and fatal pneumonia. As a result, Advance Care Planning with attention to naming a health care decision maker (someone you trust to make healthcare decisions for you if you could not speak for yourself) and sharing other health care preferences is important BEFORE a possible health crisis. Please contact your Primary Care Provider to discuss Advance Care Planning. Preventing the Spread of Coronavirus Disease 2019 in Homes and Residential Communities  For the most recent information go to [email or web address]    Prevention steps for People with confirmed or suspected COVID-19 (including persons under investigation) who do not need to be hospitalized  and   People with confirmed COVID-19 who were hospitalized and determined to be medically stable to go home    Your healthcare provider and public health staff will evaluate whether you can be cared for at home. If it is determined that you do not need to be hospitalized and can be isolated at home, you will be monitored by staff from your local or state health department. You should follow the prevention steps below until a healthcare provider or local or state health department says you can return to your normal activities. Stay home except to get medical care  People who are mildly ill with COVID-19 are able to isolate at home during their illness. You should restrict activities outside your home, except for getting medical care. Do not go to work, school, or public areas. Avoid using public transportation, ride-sharing, or taxis. Separate yourself from other people and animals in your home  People: As much as possible, you should stay in a specific room and away from other people in your home.  Also, you should use a separate before eating or preparing food. If soap and water are not readily available, use an alcohol-based hand  with at least 60% alcohol, covering all surfaces of your hands and rubbing them together until they feel dry. Soap and water are the best option if hands are visibly dirty. Avoid touching your eyes, nose, and mouth with unwashed hands. Avoid sharing personal household items  You should not share dishes, drinking glasses, cups, eating utensils, towels, or bedding with other people or pets in your home. After using these items, they should be washed thoroughly with soap and water. Clean all high-touch surfaces everyday  High touch surfaces include counters, tabletops, doorknobs, bathroom fixtures, toilets, phones, keyboards, tablets, and bedside tables. Also, clean any surfaces that may have blood, stool, or body fluids on them. Use a household cleaning spray or wipe, according to the label instructions. Labels contain instructions for safe and effective use of the cleaning product including precautions you should take when applying the product, such as wearing gloves and making sure you have good ventilation during use of the product. Monitor your symptoms  Seek prompt medical attention if your illness is worsening (e.g., difficulty breathing). Before seeking care, call your healthcare provider and tell them that you have, or are being evaluated for, COVID-19. Put on a facemask before you enter the facility. These steps will help the healthcare providers office to keep other people in the office or waiting room from getting infected or exposed. Ask your healthcare provider to call the local or state health department. Persons who are placed under active monitoring or facilitated self-monitoring should follow instructions provided by their local health department or occupational health professionals, as appropriate. When working with your local health department check their available hours.   If you have a medical emergency and need to call 911, notify the dispatch personnel that you have, or are being evaluated for COVID-19. If possible, put on a facemask before emergency medical services arrive. Discontinuing home isolation  Patients with confirmed COVID-19 should remain under home isolation precautions until the risk of secondary transmission to others is thought to be low. The decision to discontinue home isolation precautions should be made on a case-by-case basis, in consultation with healthcare providers and state and local health departments.

## 2021-12-31 ENCOUNTER — PATIENT MESSAGE (OUTPATIENT)
Dept: FAMILY MEDICINE CLINIC | Age: 61
End: 2021-12-31

## 2022-01-01 NOTE — PLAN OF CARE
9/11/2020 1153 by Emily Calderon RD, LD  Outcome: Ongoing  Note: Nutrition Problem #1: Inadequate oral intake  Intervention: Food and/or Nutrient Delivery: Continue Oral Nutrition Supplement, Continue Current Diet  Nutritional Goals: meet % of estimated nutrition needs 0

## 2022-01-02 NOTE — TELEPHONE ENCOUNTER
From: Adrien Cardona II  To: Genny Alanis  Sent: 12/31/2021 2:07 PM EST  Subject: Non-Urgent Medical Question    Charlene Mendoza had bloody diarrhea today is 3 in a row the color of cranberry juice.  Can I set up an appointment for Monday

## 2022-01-03 ENCOUNTER — HOSPITAL ENCOUNTER (OUTPATIENT)
Age: 62
Setting detail: SPECIMEN
Discharge: HOME | End: 2022-01-03

## 2022-01-03 ENCOUNTER — OFFICE VISIT (OUTPATIENT)
Dept: FAMILY MEDICINE CLINIC | Age: 62
End: 2022-01-03
Payer: COMMERCIAL

## 2022-01-03 VITALS
OXYGEN SATURATION: 97 % | HEART RATE: 90 BPM | BODY MASS INDEX: 39.34 KG/M2 | HEIGHT: 71 IN | WEIGHT: 281 LBS | SYSTOLIC BLOOD PRESSURE: 115 MMHG | TEMPERATURE: 98.2 F | DIASTOLIC BLOOD PRESSURE: 74 MMHG

## 2022-01-03 DIAGNOSIS — Z12.11 SCREEN FOR COLON CANCER: ICD-10-CM

## 2022-01-03 DIAGNOSIS — R19.5 DARK STOOLS: Primary | ICD-10-CM

## 2022-01-03 DIAGNOSIS — R06.02 SHORTNESS OF BREATH: ICD-10-CM

## 2022-01-03 DIAGNOSIS — K62.5 RECTAL BLEEDING: ICD-10-CM

## 2022-01-03 DIAGNOSIS — R19.5 DARK STOOLS: ICD-10-CM

## 2022-01-03 LAB
ABSOLUTE EOS #: 0.41 K/UL (ref 0–0.44)
ABSOLUTE IMMATURE GRANULOCYTE: 0.18 K/UL (ref 0–0.3)
ABSOLUTE LYMPH #: 3.5 K/UL (ref 1.1–3.7)
ABSOLUTE MONO #: 1.04 K/UL (ref 0.1–1.2)
ANION GAP SERPL CALCULATED.3IONS-SCNC: 15 MMOL/L (ref 9–17)
BASOPHILS # BLD: 1 % (ref 0–2)
BASOPHILS ABSOLUTE: 0.07 K/UL (ref 0–0.2)
BUN BLDV-MCNC: 47 MG/DL (ref 8–23)
BUN/CREAT BLD: ABNORMAL (ref 9–20)
CALCIUM SERPL-MCNC: 9.9 MG/DL (ref 8.6–10.4)
CHLORIDE BLD-SCNC: 105 MMOL/L (ref 98–107)
CO2: 15 MMOL/L (ref 20–31)
CREAT SERPL-MCNC: 2.31 MG/DL (ref 0.7–1.2)
D-DIMER QUANTITATIVE: 0.4 MG/L FEU
DIFFERENTIAL TYPE: ABNORMAL
EOSINOPHILS RELATIVE PERCENT: 3 % (ref 1–4)
GFR AFRICAN AMERICAN: 35 ML/MIN
GFR NON-AFRICAN AMERICAN: 29 ML/MIN
GFR SERPL CREATININE-BSD FRML MDRD: ABNORMAL ML/MIN/{1.73_M2}
GFR SERPL CREATININE-BSD FRML MDRD: ABNORMAL ML/MIN/{1.73_M2}
GLUCOSE BLD-MCNC: 216 MG/DL (ref 70–99)
HCT VFR BLD CALC: 35.5 % (ref 40.7–50.3)
HEMOCCULT STL QL: NORMAL
HEMOCCULT STL QL: NORMAL
HEMOCCULT STL QL: POSITIVE
HEMOGLOBIN: 11.6 G/DL (ref 13–17)
IMMATURE GRANULOCYTES: 1 %
LYMPHOCYTES # BLD: 28 % (ref 24–43)
MCH RBC QN AUTO: 30.1 PG (ref 25.2–33.5)
MCHC RBC AUTO-ENTMCNC: 32.7 G/DL (ref 28.4–34.8)
MCV RBC AUTO: 92.2 FL (ref 82.6–102.9)
MONOCYTES # BLD: 8 % (ref 3–12)
NRBC AUTOMATED: 0 PER 100 WBC
PDW BLD-RTO: 14.2 % (ref 11.8–14.4)
PLATELET # BLD: 208 K/UL (ref 138–453)
PLATELET ESTIMATE: ABNORMAL
PMV BLD AUTO: 11.7 FL (ref 8.1–13.5)
POTASSIUM SERPL-SCNC: 6 MMOL/L (ref 3.7–5.3)
RBC # BLD: 3.85 M/UL (ref 4.21–5.77)
RBC # BLD: ABNORMAL 10*6/UL
SEG NEUTROPHILS: 59 % (ref 36–65)
SEGMENTED NEUTROPHILS ABSOLUTE COUNT: 7.25 K/UL (ref 1.5–8.1)
SODIUM BLD-SCNC: 135 MMOL/L (ref 135–144)
TROPONIN INTERP: ABNORMAL
TROPONIN T: ABNORMAL NG/ML
TROPONIN, HIGH SENSITIVITY: 58 NG/L (ref 0–22)
WBC # BLD: 12.5 K/UL (ref 3.5–11.3)
WBC # BLD: ABNORMAL 10*3/UL

## 2022-01-03 PROCEDURE — 82270 OCCULT BLOOD FECES: CPT | Performed by: NURSE PRACTITIONER

## 2022-01-03 PROCEDURE — 99214 OFFICE O/P EST MOD 30 MIN: CPT | Performed by: NURSE PRACTITIONER

## 2022-01-03 ASSESSMENT — ENCOUNTER SYMPTOMS
ABDOMINAL DISTENTION: 0
CONSTIPATION: 0
CHEST TIGHTNESS: 0
ABDOMINAL PAIN: 0
COUGH: 0
DIARRHEA: 0
NAUSEA: 0
SHORTNESS OF BREATH: 1
BLOOD IN STOOL: 1
VOMITING: 0
ANAL BLEEDING: 1
WHEEZING: 0

## 2022-01-03 NOTE — PROGRESS NOTES
[address]  [place], [address]  [phone]      Minna Cerda II is a 59 y.o. male who presents today for his  medicalconditions/complaints as noted below. Minna Cerda II is c/o of Diarrhea (started last Wed,noticed red on Wed, [address] slowed Fri but still red. today was first day with solid BM but still red. no pain. no otc tx. stopped eliquis on Wed due to seeing blood in toilet)  . HPI:    HPI  Pt. Here today for evaluation of new onset rectal bleeding and dark stools. This started 5 days ago with no known trigger. He did cut his eliquis down to once daily, and has been on this med for past 1.25 years for 1 time episode of afib. He sees cardio, dr Elisa Denson. Denies abd. Pain, n/v/d/c, but admits his stool was hard to flush last week. Denies weight loss or loss of appetite. Started with SOB yesterday and has had a lot of blood in toilet. Cranberry in color. States he sent back colo guard kit in oct. 2021 with no return on results. Past Medical History:   Diagnosis Date    Arthritis     BILATERAL KNEESand right shoulder    Back pain     Colostomy RUQ 09/23/2020    Frequent headaches     10/28/2020 PATIENT STATES EVERY OTHER DAY.  Gout     History of atrial fibrillation     AFTER SURGERY ON 09/23/2020 WENT INTO A FIB. CONVERTED BACK TO NORMAL RHYTHM ON HIS OWN. CARDIOLOGIST DR Brigido Villanueva History of blood transfusion     NO REACTION    [place] (hard of hearing)     HTN     Arianne Melendez CNP    Hyperkalemia     Hyperlipidemia     Incisional hernia     Kidney infection     Sepsis ([facility] [address]     post surgery. leak in bowel    Sleep apnea     USES CPAP MACHINE    T2DM     K. Alex Us CNP.  diet controlled    Tooth missing     RIGHT UPPER 2ND MOLAR    Under care of team 08/2021    Dr. Guillermina Smith Wears glasses     READING    Wellness examination 07/2021    Estuardo Kramer and Stacey Croft       Past Surgical History:   Procedure Laterality Date    ADENOIDECTOMY      TWICE AS A CHILD    ANKLE SURGERY Right 2010    RUPTURED ACHILES    CARPAL TUNNEL RELEASE Bilateral     COLOSTOMY      temporary    CYSTO/URETERO/PYELOSCOPY, CALCULUS TX Right 10/30/2020    HOLMIUM-STANDBY, CYSTOSCOPY, URETEROSCOPY, STENT EXCHANGE performed by Sonia Nettles MD at [facility] Right 09/01/2020    CYSTOSCOPY RIGHT URETERAL STENT INSERTION performed by Trip Powell MD at [address] [facility] CATH POWER PICC TRIPLE  09/03/2020    REMOVED AROUND 10/07/2020    KIDNEY SURGERY Left 09/02/2020    LAPAROSCOPIC XI ROBOTIC NEPHRECTOMY performed by Claire Yo MD at [facility] ARTHROSCOPY Bilateral     LAPAROTOMY N/A 09/23/2020    LAPAROTOMY EXPLORATORY performed by Lin Andrew DO at [address] [place] [address] N/A 09/02/2020    EXPLORATORY LAPAROTOMY, RESECTION OF PROXIMAL JEJUNUM AND DESCENDING COLON WITH MOBILIZATION OF SPLENIC FLEXURE AND PRIMARY ANASTAMOSISOF SMALL BOWEL AND COLON, PLACEMENT OF GASTROSTOMY TUBE performed by Lin Andrew DO at [address] [place] [address] N/A 12/18/2020    EXPLORATORY LAPAROTOMY,COLOSTOMY REVERSAL, TAP BLOCK DONE IN OR BY DR Garcia Mention performed by Lin Andrew DO at [address] [place] [address] 3 TIMES  AS A CHILD    TYMPANOSTOMY TUBE PLACEMENT       Family History   Problem Relation Age of Onset    Stroke Maternal Grandmother     Stroke Maternal Grandfather     Other Mother         AORTIC BYPASS LED TO KIDNEY FAILURE    Kidney Disease Mother     Diabetes Father     Stroke Father      Social History     Tobacco Use    Smoking status: Never Smoker    Smokeless tobacco: Former User     Types: Chew   Substance Use Topics    Alcohol use:  Yes     Alcohol/week: 2.0 standard drinks     Types: 2 Cans of beer per week      Current Outpatient Medications   Medication Sig Dispense Refill    allopurinol (ZYLOPRIM) 100 MG tablet TAKE 1 TABLET BY MOUTH EVERY DAY 90 tablet 0    DULoxetine (CYMBALTA) 20 MG extended release capsule TAKE 1 CAPSULE BY MOUTH EVERY DAY 90 capsule 3    cyclobenzaprine (FLEXERIL) 10 MG tablet TAKE 1 TABLET BY MOUTH 2 TIMES DAILY AS NEEDED FOR MUSCLE SPASMS 180 tablet 1    sodium bicarbonate 650 MG tablet       CLENPIQ 10-3.5-12 MG-GM -GM/160ML SOLN AS DIRECTED      metoprolol (LOPRESSOR) 100 MG tablet Take 100 mg by mouth 2 times daily       zolpidem (AMBIEN CR) 12.5 MG extended release tablet Take 1 tablet by mouth nightly as needed for Sleep for up to 90 days. 90 tablet 0    hydrOXYzine (ATARAX) 50 MG tablet TAKE 1 TABLET BY MOUTH EVERY DAY AT NIGHT 30 tablet 5    dilTIAZem (CARDIZEM) 60 MG tablet Take 1 tablet by mouth every 12 hours 180 tablet 3    blood glucose test strips (TRUE METRIX BLOOD GLUCOSE TEST) strip PLEASE SEE ATTACHED FOR DETAILED DIRECTIONS 150 strip 0    ferrous sulfate (IRON 325) 325 (65 Fe) MG tablet Take 1 tablet by mouth daily (with breakfast) 180 tablet 1    CPAP Machine MISC use as directed      Respiratory Therapy Supplies (CARETOUCH CPAP & BIPAP HOSE) MISC Mask and Tubing      Melatonin 10 MG TABS Take 1 tablet by mouth nightly as needed      Multiple Vitamins-Minerals (THERAPEUTIC MULTIVITAMIN-MINERALS) tablet Take 1 tablet by mouth daily 90 tablet 1    vitamin C (ASCORBIC ACID) 500 MG tablet Take 500 mg by mouth daily      aspirin 81 MG tablet Take 81 mg by mouth nightly Stopped 12/14/2020      pravastatin (PRAVACHOL) 20 MG tablet Take 20 mg by mouth daily      hydrALAZINE (APRESOLINE) 25 MG tablet Take 1 tablet by mouth 3 times daily (Patient taking differently: Take 25 mg by mouth 2 times daily ) 270 tablet 1    apixaban (ELIQUIS) 5 MG TABS tablet Take 1 tablet by mouth 2 times daily (Patient not taking: Reported on 1/3/2022) 180 tablet 1    acetaminophen (TYLENOL) 500 MG tablet Take 2 tablets by mouth every 6 hours as needed for Pain 40 tablet 0     No current facility-administered medications for this visit.      Allergies Allergen Reactions    Ampicillin Swelling     Swelling of throat.  Pcn [Penicillins] Swelling     Throat swelling. Tolerated cefepime during 8/31/20 admission.  Sulfa Antibiotics      Other reaction(s): Unknown    Tape [Adhesive Tape] Other (See Comments)     CAUSES REDNESS. PAPER TAPE OK. Health Maintenance   Topic Date Due    COVID-19 Vaccine (1) Never done    Diabetic retinal exam  Never done    Colon cancer screen colonoscopy  Never done    Shingles Vaccine (1 of 2) Never done    Flu vaccine (1) Never done    DTaP/Tdap/Td vaccine (1 - Tdap) 02/01/2022 (Originally 1/13/1979)    Diabetic foot exam  02/01/2022    A1C test (Diabetic or Prediabetic)  02/01/2022    Depression Screen  02/01/2022    Diabetic microalbuminuria test  08/06/2022    Lipid screen  08/06/2022    Creatinine monitoring  09/10/2022    Potassium monitoring  09/24/2022    Pneumococcal 0-64 years Vaccine (2 of 2 - PPSV23) 01/13/2023    Hepatitis C screen  Completed    HIV screen  Completed    Hepatitis A vaccine  Aged Out    Hib vaccine  Aged Out    Meningococcal (ACWY) vaccine  Aged Out       Subjective:      Review of Systems   Constitutional: Negative for activity change, appetite change, chills, diaphoresis, fatigue, fever and unexpected weight change. Eyes: Negative for visual disturbance. Respiratory: Positive for shortness of breath. Negative for cough, chest tightness and wheezing. Cardiovascular: Negative for chest pain, palpitations and leg swelling. Gastrointestinal: Positive for anal bleeding and blood in stool. Negative for abdominal distention, abdominal pain, constipation, diarrhea, nausea and vomiting. Genitourinary: Negative for difficulty urinating and hematuria. Skin: Negative for rash. Neurological: Negative for dizziness, weakness, light-headedness and headaches. Hematological: Negative for adenopathy. Psychiatric/Behavioral: Negative for sleep disturbance.  The patient is not nervous/anxious. Objective:      Physical Exam  Vitals and nursing note reviewed. Constitutional:       General: He is not in acute distress. Appearance: Normal appearance. He is well-developed. He is obese. He is not ill-appearing or diaphoretic. HENT:      Head: Normocephalic and atraumatic. Eyes:      Conjunctiva/sclera: Conjunctivae normal.   Cardiovascular:      Rate and Rhythm: Normal rate and regular rhythm. Pulses: Normal pulses. Heart sounds: Normal heart sounds. Comments: No LE edema  Pulmonary:      Effort: Pulmonary effort is normal.      Breath sounds: Normal breath sounds. Abdominal:      General: Bowel sounds are normal.      Palpations: Abdomen is soft. Hernia: A hernia (ventral) is present. Genitourinary:     Prostate: Normal.      Rectum: Normal. Guaiac result positive. Comments: Skin tag noted externally to side of rectum, non tender  Musculoskeletal:      Cervical back: Neck supple. Skin:     General: Skin is warm and dry. Neurological:      General: No focal deficit present. Mental Status: He is alert and oriented to person, place, and time. Mental status is at baseline. Psychiatric:         Mood and Affect: Mood normal.         Behavior: Behavior normal.         Thought Content: Thought content normal.         Judgment: Judgment normal.         Assessment:       Diagnosis Orders   1. Dark stools  COLONOSCOPY (Diagnostic)    CBC Auto Differential    Basic Metabolic Panel    POCT occult blood stool   2. Rectal bleeding  COLONOSCOPY (Diagnostic)    CBC Auto Differential    Basic Metabolic Panel    POCT occult blood stool   3. Screen for colon cancer     4.  Shortness of breath  CBC Auto Differential    Basic Metabolic Panel    Troponin I    D-Dimer, Quantitative     Results for orders placed or performed in visit on 01/03/22   POCT occult blood stool   Result Value Ref Range    OCCULT BLOOD FECAL Positive     OCCULT BLOOD FECAL OCCULT BLOOD FECAL       Wt Readings from Last 3 Encounters:   01/03/22 281 lb (127.5 kg)   12/21/21 289 lb (131.1 kg)   12/02/21 270 lb (122.5 kg)     Has been working on weight loss  Plan:      Return if symptoms worsen or fail to improve. Orders Placed This Encounter   Procedures    COLONOSCOPY (Diagnostic)     Standing Status:   Future     Standing Expiration Date:   4/3/2022     Order Specific Question:   Screening or Diagnostic? Answer:   Diagnostic    CBC Auto Differential     Standing Status:   Future     Number of Occurrences:   1     Standing Expiration Date:   1/3/2023    Basic Metabolic Panel     Standing Status:   Future     Number of Occurrences:   1     Standing Expiration Date:   1/3/2023    Troponin I     Standing Status:   Future     Number of Occurrences:   1     Standing Expiration Date:   1/3/2023    D-Dimer, Quantitative     Standing Status:   Future     Number of Occurrences:   1     Standing Expiration Date:   1/3/2023    POCT occult blood stool     Check colonoscopy with new symptoms  Never received colguard kit or to company  Check labs for new SOB/bleeding  Will call with test results  Go to ER if suddenly worsening      Patient given educational materials - see patient instructions. Discussed use,benefit, and side effects of prescribed medications. All patient questions answered. Pt voiced understanding. Reviewed health maintenance. Instructed to continue currentmedications, diet and exercise.     Electronically signed by TORREY Saxena CNP,CNP on 1/3/2022 at 3:43 PM

## 2022-01-04 ENCOUNTER — APPOINTMENT (OUTPATIENT)
Dept: GENERAL RADIOLOGY | Age: 62
DRG: 381 | End: 2022-01-04
Payer: COMMERCIAL

## 2022-01-04 ENCOUNTER — HOSPITAL ENCOUNTER (INPATIENT)
Age: 62
LOS: 2 days | Discharge: HOME | DRG: 381 | End: 2022-01-06
Attending: EMERGENCY MEDICINE | Admitting: FAMILY MEDICINE
Payer: COMMERCIAL

## 2022-01-04 ENCOUNTER — APPOINTMENT (OUTPATIENT)
Dept: ULTRASOUND IMAGING | Age: 62
DRG: 381 | End: 2022-01-04
Payer: COMMERCIAL

## 2022-01-04 DIAGNOSIS — N17.9 AKI (ACUTE KIDNEY INJURY) (HCC): ICD-10-CM

## 2022-01-04 DIAGNOSIS — E87.5 HYPERKALEMIA: Primary | ICD-10-CM

## 2022-01-04 DIAGNOSIS — K92.2 GASTROINTESTINAL HEMORRHAGE, UNSPECIFIED GASTROINTESTINAL HEMORRHAGE TYPE: ICD-10-CM

## 2022-01-04 PROBLEM — N18.9 ACUTE KIDNEY INJURY SUPERIMPOSED ON CKD (HCC): Status: ACTIVE | Noted: 2022-01-04

## 2022-01-04 LAB
-: ABNORMAL
ABSOLUTE EOS #: 0.32 K/UL (ref 0–0.44)
ABSOLUTE IMMATURE GRANULOCYTE: 0.16 K/UL (ref 0–0.3)
ABSOLUTE LYMPH #: 2.56 K/UL (ref 1.1–3.7)
ABSOLUTE MONO #: 0.79 K/UL (ref 0.1–1.2)
ALBUMIN SERPL-MCNC: 4.2 G/DL (ref 3.5–5.2)
ALBUMIN/GLOBULIN RATIO: 1.4 (ref 1–2.5)
ALP BLD-CCNC: 83 U/L (ref 40–129)
ALT SERPL-CCNC: 38 U/L (ref 5–41)
AMORPHOUS: ABNORMAL
ANION GAP SERPL CALCULATED.3IONS-SCNC: 11 MMOL/L (ref 9–17)
AST SERPL-CCNC: 31 U/L
BACTERIA: ABNORMAL
BASOPHILS # BLD: 1 % (ref 0–2)
BASOPHILS ABSOLUTE: 0.06 K/UL (ref 0–0.2)
BILIRUB SERPL-MCNC: 0.32 MG/DL (ref 0.3–1.2)
BILIRUBIN URINE: NEGATIVE
BNP INTERPRETATION: NORMAL
BUN BLDV-MCNC: 54 MG/DL (ref 8–23)
BUN/CREAT BLD: ABNORMAL (ref 9–20)
CALCIUM SERPL-MCNC: 8.9 MG/DL (ref 8.6–10.4)
CASTS UA: ABNORMAL /LPF (ref 0–8)
CHLORIDE BLD-SCNC: 102 MMOL/L (ref 98–107)
CHLORIDE, UR: 82 MMOL/L
CO2: 14 MMOL/L (ref 20–31)
COLOR: YELLOW
CREAT SERPL-MCNC: 2.32 MG/DL (ref 0.7–1.2)
CRYSTALS, UA: ABNORMAL /HPF
DIFFERENTIAL TYPE: ABNORMAL
EOSINOPHILS RELATIVE PERCENT: 3 % (ref 1–4)
EPITHELIAL CELLS UA: ABNORMAL /HPF (ref 0–5)
GFR AFRICAN AMERICAN: 35 ML/MIN
GFR NON-AFRICAN AMERICAN: 29 ML/MIN
GFR SERPL CREATININE-BSD FRML MDRD: ABNORMAL ML/MIN/{1.73_M2}
GFR SERPL CREATININE-BSD FRML MDRD: ABNORMAL ML/MIN/{1.73_M2}
GLUCOSE BLD-MCNC: 217 MG/DL (ref 70–99)
GLUCOSE URINE: ABNORMAL
HCT VFR BLD CALC: 32.4 % (ref 40.7–50.3)
HEMOGLOBIN: 10.6 G/DL (ref 13–17)
IMMATURE GRANULOCYTES: 2 %
KETONES, URINE: NEGATIVE
LEUKOCYTE ESTERASE, URINE: NEGATIVE
LYMPHOCYTES # BLD: 27 % (ref 24–43)
MCH RBC QN AUTO: 30.1 PG (ref 25.2–33.5)
MCHC RBC AUTO-ENTMCNC: 32.7 G/DL (ref 28.4–34.8)
MCV RBC AUTO: 92 FL (ref 82.6–102.9)
MONOCYTES # BLD: 8 % (ref 3–12)
MUCUS: ABNORMAL
NITRITE, URINE: NEGATIVE
NRBC AUTOMATED: 0 PER 100 WBC
OTHER OBSERVATIONS UA: ABNORMAL
PDW BLD-RTO: 14.4 % (ref 11.8–14.4)
PH UA: 5 (ref 5–8)
PLATELET # BLD: 204 K/UL (ref 138–453)
PLATELET ESTIMATE: ABNORMAL
PMV BLD AUTO: 10.5 FL (ref 8.1–13.5)
POTASSIUM SERPL-SCNC: 6.4 MMOL/L (ref 3.7–5.3)
PRO-BNP: 88 PG/ML
PROTEIN UA: ABNORMAL
RBC # BLD: 3.52 M/UL (ref 4.21–5.77)
RBC # BLD: ABNORMAL 10*6/UL
RBC UA: ABNORMAL /HPF (ref 0–4)
RENAL EPITHELIAL, UA: ABNORMAL /HPF
SEG NEUTROPHILS: 59 % (ref 36–65)
SEGMENTED NEUTROPHILS ABSOLUTE COUNT: 5.59 K/UL (ref 1.5–8.1)
SODIUM BLD-SCNC: 127 MMOL/L (ref 135–144)
SODIUM,UR: 70 MMOL/L
SPECIFIC GRAVITY UA: 1.02 (ref 1–1.03)
TOTAL PROTEIN: 7.2 G/DL (ref 6.4–8.3)
TRICHOMONAS: ABNORMAL
TROPONIN INTERP: ABNORMAL
TROPONIN T: ABNORMAL NG/ML
TROPONIN, HIGH SENSITIVITY: 59 NG/L (ref 0–22)
TURBIDITY: CLEAR
URINE HGB: NEGATIVE
UROBILINOGEN, URINE: NORMAL
WBC # BLD: 9.5 K/UL (ref 3.5–11.3)
WBC # BLD: ABNORMAL 10*3/UL
WBC UA: ABNORMAL /HPF (ref 0–5)
YEAST: ABNORMAL

## 2022-01-04 PROCEDURE — 71046 X-RAY EXAM CHEST 2 VIEWS: CPT

## 2022-01-04 PROCEDURE — 96374 THER/PROPH/DIAG INJ IV PUSH: CPT

## 2022-01-04 PROCEDURE — 76770 US EXAM ABDO BACK WALL COMP: CPT

## 2022-01-04 PROCEDURE — 93005 ELECTROCARDIOGRAM TRACING: CPT

## 2022-01-04 PROCEDURE — 87086 URINE CULTURE/COLONY COUNT: CPT

## 2022-01-04 PROCEDURE — 6370000000 HC RX 637 (ALT 250 FOR IP): Performed by: STUDENT IN AN ORGANIZED HEALTH CARE EDUCATION/TRAINING PROGRAM

## 2022-01-04 PROCEDURE — 81001 URINALYSIS AUTO W/SCOPE: CPT

## 2022-01-04 PROCEDURE — 80053 COMPREHEN METABOLIC PANEL: CPT

## 2022-01-04 PROCEDURE — 85025 COMPLETE CBC W/AUTO DIFF WBC: CPT

## 2022-01-04 PROCEDURE — 99284 EMERGENCY DEPT VISIT MOD MDM: CPT

## 2022-01-04 PROCEDURE — 1200000000 HC SEMI PRIVATE

## 2022-01-04 PROCEDURE — 2500000003 HC RX 250 WO HCPCS: Performed by: STUDENT IN AN ORGANIZED HEALTH CARE EDUCATION/TRAINING PROGRAM

## 2022-01-04 PROCEDURE — 2580000003 HC RX 258: Performed by: STUDENT IN AN ORGANIZED HEALTH CARE EDUCATION/TRAINING PROGRAM

## 2022-01-04 PROCEDURE — 82436 ASSAY OF URINE CHLORIDE: CPT

## 2022-01-04 PROCEDURE — 84484 ASSAY OF TROPONIN QUANT: CPT

## 2022-01-04 PROCEDURE — 83880 ASSAY OF NATRIURETIC PEPTIDE: CPT

## 2022-01-04 PROCEDURE — 96375 TX/PRO/DX INJ NEW DRUG ADDON: CPT

## 2022-01-04 PROCEDURE — 84300 ASSAY OF URINE SODIUM: CPT

## 2022-01-04 RX ORDER — FLUDROCORTISONE ACETATE 0.1 MG/1
0.1 TABLET ORAL ONCE
Status: COMPLETED | OUTPATIENT
Start: 2022-01-04 | End: 2022-01-04

## 2022-01-04 RX ORDER — DEXTROSE MONOHYDRATE 25 G/50ML
25 INJECTION, SOLUTION INTRAVENOUS ONCE
Status: COMPLETED | OUTPATIENT
Start: 2022-01-04 | End: 2022-01-04

## 2022-01-04 RX ADMIN — Medication 10 UNITS: at 19:29

## 2022-01-04 RX ADMIN — DEXTROSE MONOHYDRATE 25 G: 500 INJECTION PARENTERAL at 20:17

## 2022-01-04 RX ADMIN — FLUDROCORTISONE ACETATE 0.1 MG: 0.1 TABLET ORAL at 19:52

## 2022-01-04 RX ADMIN — SODIUM ZIRCONIUM CYCLOSILICATE 10 G: 5 POWDER, FOR SUSPENSION ORAL at 19:50

## 2022-01-04 RX ADMIN — SODIUM BICARBONATE: 84 INJECTION, SOLUTION INTRAVENOUS at 20:17

## 2022-01-04 NOTE — ED TRIAGE NOTES
Pt presents per PCP recommendation, had labs done at outside facility.  Concerns for hyperkalemia and elevated tropoonin

## 2022-01-04 NOTE — ED PROVIDER NOTES
[facility] HANDOFF       Handoff taken on the following patient from prior Attending Physician:  Pt Name: Dennie Shad FINN  PCP:  TORREY Navarro - CNP    Attestation  I was available and discussed any additional care issues that arose and coordinated the management plans with the resident(s) caring for the patient during my duty period. Any areas of disagreement with resident's documentation of care or procedures are noted on the chart. I was personally present for the key portions of any/all procedures during my duty period. I have documented in the chart those procedures where I was not present during the key portions.          [facility]       Chief Complaint   Patient presents with    Rectal Bleeding    Discuss Labs         CURRENT MEDICATIONS     Previous Medications  Previous Medications    ACETAMINOPHEN (TYLENOL) 500 MG TABLET    Take 2 tablets by mouth every 6 hours as needed for Pain    ALLOPURINOL (ZYLOPRIM) 100 MG TABLET    TAKE 1 TABLET BY MOUTH EVERY DAY    APIXABAN (ELIQUIS) 5 MG TABS TABLET    Take 1 tablet by mouth 2 times daily    ASPIRIN 81 MG TABLET    Take 81 mg by mouth nightly Stopped 12/14/2020    BLOOD GLUCOSE TEST STRIPS (TRUE METRIX BLOOD GLUCOSE TEST) STRIP    PLEASE SEE ATTACHED FOR DETAILED DIRECTIONS    CLENPIQ 10-3.5-12 MG-GM -GM/160ML SOLN    AS DIRECTED    CPAP MACHINE MISC    use as directed    CYCLOBENZAPRINE (FLEXERIL) 10 MG TABLET    TAKE 1 TABLET BY MOUTH 2 TIMES DAILY AS NEEDED FOR MUSCLE SPASMS    DILTIAZEM (CARDIZEM) 60 MG TABLET    Take 1 tablet by mouth every 12 hours    DULOXETINE (CYMBALTA) 20 MG EXTENDED RELEASE CAPSULE    TAKE 1 CAPSULE BY MOUTH EVERY DAY    FERROUS SULFATE (IRON 325) 325 (65 FE) MG TABLET    Take 1 tablet by mouth daily (with breakfast)    HYDRALAZINE (APRESOLINE) 25 MG TABLET    Take 1 tablet by mouth 3 times daily    HYDROXYZINE (ATARAX) 50 MG TABLET    TAKE 1 TABLET BY MOUTH EVERY DAY AT NIGHT MELATONIN 10 MG TABS    Take 1 tablet by mouth nightly as needed    METOPROLOL (LOPRESSOR) 100 MG TABLET    Take 100 mg by mouth 2 times daily     MULTIPLE VITAMINS-MINERALS (THERAPEUTIC MULTIVITAMIN-MINERALS) TABLET    Take 1 tablet by mouth daily    PRAVASTATIN (PRAVACHOL) 20 MG TABLET    Take 20 mg by mouth daily    RESPIRATORY THERAPY SUPPLIES (CARETOUCH CPAP & BIPAP HOSE) MISC    Mask and Tubing    SODIUM BICARBONATE 650 MG TABLET        VITAMIN C (ASCORBIC ACID) 500 MG TABLET    Take 500 mg by mouth daily    ZOLPIDEM (AMBIEN CR) 12.5 MG EXTENDED RELEASE TABLET    Take 1 tablet by mouth nightly as needed for Sleep for up to 90 days. Encounter Medications  No orders of the defined types were placed in this encounter. ALLERGIES     is allergic to ampicillin, pcn [penicillins], sulfa antibiotics, and tape [adhesive tape]. RECENT VITALS:   Temp: 97 °F (36.1 °C),  Pulse: 62, Resp: 18, BP: 108/67    RADIOLOGY:   XR CHEST (2 VW)   Final Result   No acute airspace disease identified. LABS:  Labs Reviewed   CBC WITH AUTO DIFFERENTIAL   COMPREHENSIVE METABOLIC PANEL W/ REFLEX TO MG FOR LOW K   TROPONIN   BRAIN NATRIURETIC PEPTIDE     Worsening renal function, hyperkalemia, on Lokelma, has had recent bloody diarrhea for the past 3 days now melena, on Eliquis, prior colostomy with reanastomosis, hemodynamically stable, repeating laboratory studies, recommend admission for cardiac monitoring and GI consultation. PLAN/ TASKS OUTSTANDING     Labs, cardiac monitor, anticipate admission      (Please note that portions of this note were completed with a voice recognition program.  Efforts were made to edit the dictations but occasionally words are mis-transcribed. )    Crissy Rose MD,, MD, F.A.C.E.P.   Attending Emergency Physician       Crissy Rose MD  01/04/22 [id number]       Crissy Rose MD  01/04/22 1808

## 2022-01-04 NOTE — LETTER
[facility]Med [facility]  [address] [facility] [address]  Phone: [phone]             January 6, 2022    Patient: Germaine Arambula   YOB: 1960   Date of Visit: 1/4/2022       To Whom It May Concern:    Cipriano Lyonsric was seen and treated in our facility  beginning 1/4/2022 until 1/6/2022. Please call with questions.       Sincerely,       Ml Pradhan RN         Signature:__________________________________

## 2022-01-04 NOTE — ED PROVIDER NOTES
[facility]     Emergency Department     Faculty Attestation    I performed a history and physical examination of the patient and discussed management with the resident. I have reviewed and agree with the residents findings including all diagnostic interpretations, and treatment plans as written at the time of my review. Any areas of disagreement are noted on the chart. I was personally present for the key portions of any procedures. I have documented in the chart those procedures where I was not present during the key portions. For Physician Assistant/ Nurse Practitioner cases/documentation I have personally evaluated this patient and have completed at least one if not all key elements of the E/M (history, physical exam, and MDM). Additional findings are as noted. This patient was evaluated in the Emergency Department for symptoms described in the history of present illness. The patient was evaluated in the context of the global COVID-19 pandemic, which necessitated consideration that the patient might be at risk for infection with the SARS-CoV-2 virus that causes COVID-19. Institutional protocols and algorithms that pertain to the evaluation of patients at risk for COVID-19 are in a state of rapid change based on information released by regulatory bodies including the CDC and federal and state organizations. These policies and algorithms were followed during the patient's care in the ED. Primary Care Physician: TORREY Michaels - CNP    History: This is a 59 y.o. male who presents to the Emergency Department with complaint of normal labs. The patient was seen by primary care physician had blood work drawn yesterday. Were called and told to come the emergency department as the potassium was elevated at 6. Patient also had been complaining of bloody stools for last 4 days. He denies any lightheadedness or dizziness.  He does complain of dyspnea on exertion. Physical:   height is 5' 11\" (1.803 m) and weight is 278 lb (126.1 kg). His oral temperature is 97 °F (36.1 °C). His blood pressure is 108/67 and his pulse is 62. His respiration is 18 and oxygen saturation is 99%. Lungs are clear auscultation bilateral, heart regular rate and rhythm, abdomen soft he does have a large ventral hernia that is easily reducible, rectal exam per the resident is Hemoccult positive. Impression: Hyperkalemia, DEBBI, rectal bleeding    Plan: Repeat CBC, BMP, EKG,    Care of the patient turned over to Dr. Kareem Zapata    (Please note that portions of this note were completed with a voice recognition program.  Efforts were made to edit the dictations but occasionally words are mis-transcribed.)    Luis Anaya.  Sathya Shi MD, [facility]  Attending Emergency Medicine Physician        Beatriz Ayala MD  01/04/22 1144

## 2022-01-05 ENCOUNTER — ANESTHESIA EVENT (OUTPATIENT)
Dept: ENDOSCOPY | Age: 62
DRG: 381 | End: 2022-01-05
Payer: COMMERCIAL

## 2022-01-05 ENCOUNTER — ANESTHESIA (OUTPATIENT)
Dept: ENDOSCOPY | Age: 62
DRG: 381 | End: 2022-01-05
Payer: COMMERCIAL

## 2022-01-05 VITALS
OXYGEN SATURATION: 96 % | SYSTOLIC BLOOD PRESSURE: 119 MMHG | DIASTOLIC BLOOD PRESSURE: 89 MMHG | RESPIRATION RATE: 14 BRPM

## 2022-01-05 PROBLEM — K92.1 GASTROINTESTINAL HEMORRHAGE WITH MELENA: Status: ACTIVE | Noted: 2022-01-05

## 2022-01-05 PROBLEM — E87.5 HYPERKALEMIA: Status: ACTIVE | Noted: 2022-01-05

## 2022-01-05 PROBLEM — D62 ACUTE BLOOD LOSS ANEMIA: Status: ACTIVE | Noted: 2022-01-05

## 2022-01-05 LAB
-: NORMAL
ALBUMIN SERPL-MCNC: 3.8 G/DL (ref 3.5–5.2)
ALBUMIN/GLOBULIN RATIO: 1.2 (ref 1–2.5)
ALP BLD-CCNC: 74 U/L (ref 40–129)
ALT SERPL-CCNC: 36 U/L (ref 5–41)
ANION GAP SERPL CALCULATED.3IONS-SCNC: 11 MMOL/L (ref 9–17)
ANION GAP SERPL CALCULATED.3IONS-SCNC: 13 MMOL/L (ref 9–17)
AST SERPL-CCNC: 28 U/L
BILIRUB SERPL-MCNC: 0.34 MG/DL (ref 0.3–1.2)
BUN BLDV-MCNC: 42 MG/DL (ref 8–23)
BUN BLDV-MCNC: 45 MG/DL (ref 8–23)
BUN/CREAT BLD: ABNORMAL (ref 9–20)
BUN/CREAT BLD: ABNORMAL (ref 9–20)
CALCIUM SERPL-MCNC: 8.3 MG/DL (ref 8.6–10.4)
CALCIUM SERPL-MCNC: 8.9 MG/DL (ref 8.6–10.4)
CHLORIDE BLD-SCNC: 101 MMOL/L (ref 98–107)
CHLORIDE BLD-SCNC: 105 MMOL/L (ref 98–107)
CO2: 15 MMOL/L (ref 20–31)
CO2: 20 MMOL/L (ref 20–31)
CREAT SERPL-MCNC: 2.07 MG/DL (ref 0.7–1.2)
CREAT SERPL-MCNC: 2.13 MG/DL (ref 0.7–1.2)
CULTURE: NO GROWTH
GFR AFRICAN AMERICAN: 38 ML/MIN
GFR AFRICAN AMERICAN: 40 ML/MIN
GFR NON-AFRICAN AMERICAN: 32 ML/MIN
GFR NON-AFRICAN AMERICAN: 33 ML/MIN
GFR SERPL CREATININE-BSD FRML MDRD: ABNORMAL ML/MIN/{1.73_M2}
GLUCOSE BLD-MCNC: 169 MG/DL (ref 70–99)
GLUCOSE BLD-MCNC: 170 MG/DL (ref 75–110)
GLUCOSE BLD-MCNC: 192 MG/DL (ref 75–110)
GLUCOSE BLD-MCNC: 366 MG/DL (ref 70–99)
HCT VFR BLD CALC: 31.3 % (ref 40.7–50.3)
HCT VFR BLD CALC: 32.1 % (ref 40.7–50.3)
HCT VFR BLD CALC: 33.2 % (ref 40.7–50.3)
HCT VFR BLD CALC: 33.4 % (ref 40.7–50.3)
HEMOGLOBIN: 10.5 G/DL (ref 13–17)
HEMOGLOBIN: 10.6 G/DL (ref 13–17)
HEMOGLOBIN: 10.7 G/DL (ref 13–17)
HEMOGLOBIN: 11.1 G/DL (ref 13–17)
INR BLD: 1.1
Lab: NORMAL
MAGNESIUM: 1.9 MG/DL (ref 1.6–2.6)
MCH RBC QN AUTO: 30.4 PG (ref 25.2–33.5)
MCHC RBC AUTO-ENTMCNC: 32.2 G/DL (ref 28.4–34.8)
MCV RBC AUTO: 94.3 FL (ref 82.6–102.9)
NRBC AUTOMATED: 0 PER 100 WBC
PDW BLD-RTO: 14.5 % (ref 11.8–14.4)
PLATELET # BLD: 187 K/UL (ref 138–453)
PMV BLD AUTO: 10.2 FL (ref 8.1–13.5)
POTASSIUM SERPL-SCNC: 4.8 MMOL/L (ref 3.7–5.3)
POTASSIUM SERPL-SCNC: 5.5 MMOL/L (ref 3.7–5.3)
PROTHROMBIN TIME: 11.2 SEC (ref 9.1–12.3)
RBC # BLD: 3.52 M/UL (ref 4.21–5.77)
REASON FOR REJECTION: NORMAL
SODIUM BLD-SCNC: 132 MMOL/L (ref 135–144)
SODIUM BLD-SCNC: 133 MMOL/L (ref 135–144)
SPECIMEN DESCRIPTION: NORMAL
TOTAL PROTEIN: 7 G/DL (ref 6.4–8.3)
WBC # BLD: 8.9 K/UL (ref 3.5–11.3)
ZZ NTE CLEAN UP: ORDERED TEST: NORMAL
ZZ NTE WITH NAME CLEAN UP: SPECIMEN SOURCE: NORMAL

## 2022-01-05 PROCEDURE — 2709999900 HC NON-CHARGEABLE SUPPLY: Performed by: INTERNAL MEDICINE

## 2022-01-05 PROCEDURE — 6370000000 HC RX 637 (ALT 250 FOR IP): Performed by: NURSE PRACTITIONER

## 2022-01-05 PROCEDURE — 85018 HEMOGLOBIN: CPT

## 2022-01-05 PROCEDURE — 43239 EGD BIOPSY SINGLE/MULTIPLE: CPT | Performed by: INTERNAL MEDICINE

## 2022-01-05 PROCEDURE — 83735 ASSAY OF MAGNESIUM: CPT

## 2022-01-05 PROCEDURE — 2580000003 HC RX 258: Performed by: INTERNAL MEDICINE

## 2022-01-05 PROCEDURE — 80053 COMPREHEN METABOLIC PANEL: CPT

## 2022-01-05 PROCEDURE — 2580000003 HC RX 258: Performed by: NURSE ANESTHETIST, CERTIFIED REGISTERED

## 2022-01-05 PROCEDURE — 88342 IMHCHEM/IMCYTCHM 1ST ANTB: CPT

## 2022-01-05 PROCEDURE — 2500000003 HC RX 250 WO HCPCS: Performed by: NURSE ANESTHETIST, CERTIFIED REGISTERED

## 2022-01-05 PROCEDURE — 99223 1ST HOSP IP/OBS HIGH 75: CPT | Performed by: HOSPITALIST

## 2022-01-05 PROCEDURE — 1200000000 HC SEMI PRIVATE

## 2022-01-05 PROCEDURE — 6370000000 HC RX 637 (ALT 250 FOR IP): Performed by: INTERNAL MEDICINE

## 2022-01-05 PROCEDURE — 7100000010 HC PHASE II RECOVERY - FIRST 15 MIN: Performed by: INTERNAL MEDICINE

## 2022-01-05 PROCEDURE — 85027 COMPLETE CBC AUTOMATED: CPT

## 2022-01-05 PROCEDURE — 82947 ASSAY GLUCOSE BLOOD QUANT: CPT

## 2022-01-05 PROCEDURE — 0DB68ZX EXCISION OF STOMACH, VIA NATURAL OR ARTIFICIAL OPENING ENDOSCOPIC, DIAGNOSTIC: ICD-10-PCS | Performed by: INTERNAL MEDICINE

## 2022-01-05 PROCEDURE — 3609012400 HC EGD TRANSORAL BIOPSY SINGLE/MULTIPLE: Performed by: INTERNAL MEDICINE

## 2022-01-05 PROCEDURE — 36415 COLL VENOUS BLD VENIPUNCTURE: CPT

## 2022-01-05 PROCEDURE — 3700000000 HC ANESTHESIA ATTENDED CARE: Performed by: INTERNAL MEDICINE

## 2022-01-05 PROCEDURE — 7100000011 HC PHASE II RECOVERY - ADDTL 15 MIN: Performed by: INTERNAL MEDICINE

## 2022-01-05 PROCEDURE — 51798 US URINE CAPACITY MEASURE: CPT

## 2022-01-05 PROCEDURE — 2580000003 HC RX 258: Performed by: NURSE PRACTITIONER

## 2022-01-05 PROCEDURE — 85014 HEMATOCRIT: CPT

## 2022-01-05 PROCEDURE — 80048 BASIC METABOLIC PNL TOTAL CA: CPT

## 2022-01-05 PROCEDURE — 6360000002 HC RX W HCPCS: Performed by: NURSE ANESTHETIST, CERTIFIED REGISTERED

## 2022-01-05 PROCEDURE — 99222 1ST HOSP IP/OBS MODERATE 55: CPT | Performed by: INTERNAL MEDICINE

## 2022-01-05 PROCEDURE — 2500000003 HC RX 250 WO HCPCS: Performed by: INTERNAL MEDICINE

## 2022-01-05 PROCEDURE — 88305 TISSUE EXAM BY PATHOLOGIST: CPT

## 2022-01-05 PROCEDURE — 85610 PROTHROMBIN TIME: CPT

## 2022-01-05 RX ORDER — SODIUM CHLORIDE 0.9 % (FLUSH) 0.9 %
5-40 SYRINGE (ML) INJECTION PRN
Status: DISCONTINUED | OUTPATIENT
Start: 2022-01-05 | End: 2022-01-06 | Stop reason: HOSPADM

## 2022-01-05 RX ORDER — SODIUM CHLORIDE 0.9 % (FLUSH) 0.9 %
5-40 SYRINGE (ML) INJECTION EVERY 12 HOURS SCHEDULED
Status: DISCONTINUED | OUTPATIENT
Start: 2022-01-05 | End: 2022-01-06 | Stop reason: HOSPADM

## 2022-01-05 RX ORDER — HYDROXYZINE 50 MG/1
50 TABLET, FILM COATED ORAL NIGHTLY PRN
Status: DISCONTINUED | OUTPATIENT
Start: 2022-01-05 | End: 2022-01-06 | Stop reason: HOSPADM

## 2022-01-05 RX ORDER — PANTOPRAZOLE SODIUM 40 MG/1
40 TABLET, DELAYED RELEASE ORAL
Status: DISCONTINUED | OUTPATIENT
Start: 2022-01-05 | End: 2022-01-06 | Stop reason: HOSPADM

## 2022-01-05 RX ORDER — SODIUM CHLORIDE 9 MG/ML
25 INJECTION, SOLUTION INTRAVENOUS PRN
Status: DISCONTINUED | OUTPATIENT
Start: 2022-01-05 | End: 2022-01-06 | Stop reason: HOSPADM

## 2022-01-05 RX ORDER — LIDOCAINE HYDROCHLORIDE 10 MG/ML
INJECTION, SOLUTION EPIDURAL; INFILTRATION; INTRACAUDAL; PERINEURAL PRN
Status: DISCONTINUED | OUTPATIENT
Start: 2022-01-05 | End: 2022-01-05 | Stop reason: SDUPTHER

## 2022-01-05 RX ORDER — DILTIAZEM HYDROCHLORIDE 60 MG/1
60 TABLET, FILM COATED ORAL EVERY 12 HOURS
Status: DISCONTINUED | OUTPATIENT
Start: 2022-01-05 | End: 2022-01-06 | Stop reason: HOSPADM

## 2022-01-05 RX ORDER — ACETAMINOPHEN 325 MG/1
650 TABLET ORAL EVERY 6 HOURS PRN
Status: DISCONTINUED | OUTPATIENT
Start: 2022-01-05 | End: 2022-01-06 | Stop reason: HOSPADM

## 2022-01-05 RX ORDER — ONDANSETRON 4 MG/1
4 TABLET, ORALLY DISINTEGRATING ORAL EVERY 8 HOURS PRN
Status: DISCONTINUED | OUTPATIENT
Start: 2022-01-05 | End: 2022-01-06 | Stop reason: HOSPADM

## 2022-01-05 RX ORDER — SODIUM CHLORIDE 9 MG/ML
INJECTION, SOLUTION INTRAVENOUS CONTINUOUS PRN
Status: DISCONTINUED | OUTPATIENT
Start: 2022-01-05 | End: 2022-01-05 | Stop reason: SDUPTHER

## 2022-01-05 RX ORDER — PROPOFOL 10 MG/ML
INJECTION, EMULSION INTRAVENOUS PRN
Status: DISCONTINUED | OUTPATIENT
Start: 2022-01-05 | End: 2022-01-05 | Stop reason: SDUPTHER

## 2022-01-05 RX ORDER — ACETAMINOPHEN 650 MG/1
650 SUPPOSITORY RECTAL EVERY 6 HOURS PRN
Status: DISCONTINUED | OUTPATIENT
Start: 2022-01-05 | End: 2022-01-06 | Stop reason: HOSPADM

## 2022-01-05 RX ORDER — ONDANSETRON 2 MG/ML
4 INJECTION INTRAMUSCULAR; INTRAVENOUS EVERY 6 HOURS PRN
Status: DISCONTINUED | OUTPATIENT
Start: 2022-01-05 | End: 2022-01-06 | Stop reason: HOSPADM

## 2022-01-05 RX ORDER — LANOLIN ALCOHOL/MO/W.PET/CERES
325 CREAM (GRAM) TOPICAL
Status: DISCONTINUED | OUTPATIENT
Start: 2022-01-05 | End: 2022-01-06 | Stop reason: HOSPADM

## 2022-01-05 RX ORDER — SODIUM CHLORIDE 9 MG/ML
INJECTION, SOLUTION INTRAVENOUS ONCE
Status: COMPLETED | OUTPATIENT
Start: 2022-01-05 | End: 2022-01-05

## 2022-01-05 RX ORDER — ZOLPIDEM TARTRATE 5 MG/1
10 TABLET ORAL NIGHTLY PRN
Status: DISCONTINUED | OUTPATIENT
Start: 2022-01-05 | End: 2022-01-06 | Stop reason: HOSPADM

## 2022-01-05 RX ADMIN — SODIUM ZIRCONIUM CYCLOSILICATE 10 G: 10 POWDER, FOR SUSPENSION ORAL at 21:45

## 2022-01-05 RX ADMIN — ZOLPIDEM TARTRATE 10 MG: 5 TABLET ORAL at 21:45

## 2022-01-05 RX ADMIN — DILTIAZEM HYDROCHLORIDE 60 MG: 60 TABLET, FILM COATED ORAL at 11:25

## 2022-01-05 RX ADMIN — DILTIAZEM HYDROCHLORIDE 60 MG: 60 TABLET, FILM COATED ORAL at 21:45

## 2022-01-05 RX ADMIN — ACETAMINOPHEN 650 MG: 325 TABLET ORAL at 01:23

## 2022-01-05 RX ADMIN — LIDOCAINE HYDROCHLORIDE 50 MG: 10 INJECTION, SOLUTION EPIDURAL; INFILTRATION; INTRACAUDAL; PERINEURAL at 14:16

## 2022-01-05 RX ADMIN — SODIUM CHLORIDE: 9 INJECTION, SOLUTION INTRAVENOUS at 13:30

## 2022-01-05 RX ADMIN — SODIUM ZIRCONIUM CYCLOSILICATE 10 G: 10 POWDER, FOR SUSPENSION ORAL at 16:04

## 2022-01-05 RX ADMIN — SODIUM CHLORIDE, PRESERVATIVE FREE 10 ML: 5 INJECTION INTRAVENOUS at 12:15

## 2022-01-05 RX ADMIN — SODIUM BICARBONATE: 84 INJECTION, SOLUTION INTRAVENOUS at 22:49

## 2022-01-05 RX ADMIN — PROPOFOL 200 MG: 10 INJECTION, EMULSION INTRAVENOUS at 14:16

## 2022-01-05 RX ADMIN — SODIUM ZIRCONIUM CYCLOSILICATE 10 G: 10 POWDER, FOR SUSPENSION ORAL at 11:25

## 2022-01-05 RX ADMIN — SODIUM CHLORIDE: 9 INJECTION, SOLUTION INTRAVENOUS at 12:16

## 2022-01-05 RX ADMIN — FERROUS SULFATE TAB EC 325 MG (65 MG FE EQUIVALENT) 325 MG: 325 (65 FE) TABLET DELAYED RESPONSE at 11:25

## 2022-01-05 RX ADMIN — PANTOPRAZOLE SODIUM 40 MG: 40 TABLET, DELAYED RELEASE ORAL at 16:14

## 2022-01-05 ASSESSMENT — PAIN SCALES - GENERAL
PAINLEVEL_OUTOF10: 0
PAINLEVEL_OUTOF10: 6
PAINLEVEL_OUTOF10: 0
PAINLEVEL_OUTOF10: 0

## 2022-01-05 ASSESSMENT — ENCOUNTER SYMPTOMS
NAUSEA: 0
SINUS PAIN: 0
EYE ITCHING: 0
BACK PAIN: 1
BLOOD IN STOOL: 1
DIARRHEA: 1
VOMITING: 0
CONSTIPATION: 0
COUGH: 0
SHORTNESS OF BREATH: 1
SINUS PRESSURE: 0
ABDOMINAL PAIN: 0
WHEEZING: 0
RHINORRHEA: 0
SORE THROAT: 1

## 2022-01-05 NOTE — CONSULTS
Nephrology Consult Note    Reason for Consult: Hyperkalemia and elevated creatinine  Requesting Physician: Dr. Semaj Justice    Chief Complaint: Melanotic stool  History Obtained From: Patient    History of Present Illness: This is a 59 y.o. male who has a past medical history significant for longstanding hypertension, diabetes, obstructive sleep apnea and he underwent exploratory laparotomy on 9/2/2020 and had left nephrectomy for xanthogranulomatous pyelonephritis. Due to addition there was a resection of: 2.- 2.0 mg/dL. Patient presented to hospital with melanotic stool and found to have a potassium of 6.4. Patient was treated with [facility] and managed conservatively. His most recent potassium is 4.8. Patient underwent EGD. EGD shows erosive gastritis at GE junction. There was a congestion of the stomach mucosa H. pylori testing was sent. Patient also admits to taking Aleve 2 tablets at bedtime. At present patient is sitting comfortably. He is alert and awake. He denies any chest pain he denies any nausea vomiting or diarrhea      Past Medical History:        Diagnosis Date    Arthritis     BILATERAL KNEESand right shoulder    Back pain     Colostomy RUQ 09/23/2020    Frequent headaches     10/28/2020 PATIENT STATES EVERY OTHER DAY.  Gout     History of atrial fibrillation     AFTER SURGERY ON 09/23/2020 WENT INTO A FIB. CONVERTED BACK TO NORMAL RHYTHM ON HIS OWN. CARDIOLOGIST DR Riya Parker History of blood transfusion     NO REACTION    [place] (hard of hearing)     HTN     Gretchen Sy CNP    Hyperkalemia     Hyperlipidemia     Incisional hernia     Kidney infection     Sepsis ([address]     post surgery. leak in bowel    Sleep apnea     USES CPAP MACHINE    T2DM     K. Morteza Briggs CNP.  diet controlled    Tooth missing     RIGHT UPPER 2ND MOLAR    Under care of team 08/2021    Dr. Rick Toledo Wears glasses     READING    Wellness examination 07/2021    Beulah Friedman Sukhjinder Malone        Past Surgical History:        Procedure Laterality Date    ADENOIDECTOMY      TWICE AS A CHILD    ANKLE SURGERY Right 2010    RUPTURED ACHILES    CARPAL TUNNEL RELEASE Bilateral     COLOSTOMY      temporary    CYSTO/URETERO/PYELOSCOPY, CALCULUS TX Right 10/30/2020    HOLMIUM-STANDBY, CYSTOSCOPY, URETEROSCOPY, STENT EXCHANGE performed by Alvarez Oreilly MD at [facility] 9 Right 09/01/2020    CYSTOSCOPY RIGHT URETERAL STENT INSERTION performed by Khoi Grande MD at [facility] CATH POWER PICC TRIPLE  09/03/2020    REMOVED AROUND 10/07/2020    KIDNEY SURGERY Left 09/02/2020    LAPAROSCOPIC XI ROBOTIC NEPHRECTOMY performed by Adilene Robbins MD at [address] [place] [address] ARTHROSCOPY Bilateral     LAPAROTOMY N/A 09/23/2020    LAPAROTOMY EXPLORATORY performed by Garrett Becerra DO at [address] [place] [address] N/A 09/02/2020    EXPLORATORY LAPAROTOMY, RESECTION OF PROXIMAL JEJUNUM AND DESCENDING COLON WITH MOBILIZATION OF SPLENIC FLEXURE AND PRIMARY ANASTAMOSISOF SMALL BOWEL AND COLON, PLACEMENT OF GASTROSTOMY TUBE performed by Garrett Becerra DO at [address] [place] [address] N/A 12/18/2020    EXPLORATORY LAPAROTOMY,COLOSTOMY REVERSAL, TAP BLOCK DONE IN OR BY DR Brooke Neumann performed by Garrett Becerra DO at [address] [place] [address] 3 TIMES  AS A CHILD    TYMPANOSTOMY TUBE PLACEMENT         Current Medications:    dilTIAZem (CARDIZEM) tablet 60 mg, Q12H  ferrous sulfate (FE TABS 325) EC tablet 325 mg, Daily with breakfast  hydrOXYzine (ATARAX) tablet 50 mg, Nightly PRN  sodium chloride flush 0.9 % injection 5-40 mL, 2 times per day  sodium chloride flush 0.9 % injection 5-40 mL, PRN  0.9 % sodium chloride infusion, PRN  ondansetron (ZOFRAN-ODT) disintegrating tablet 4 mg, Q8H PRN   Or  ondansetron (ZOFRAN) injection 4 mg, Q6H PRN  acetaminophen (TYLENOL) tablet 650 mg, Q6H PRN   Or  acetaminophen (TYLENOL) suppository 650 mg, Q6H PRN  sodium zirconium cyclosilicate (LOKELMA) oral suspension 10 g, TID  sodium bicarbonate 150 mEq in dextrose 5 % 1,000 mL infusion, Continuous        Allergies:  Ampicillin, Pcn [penicillins], Sulfa antibiotics, and Tape Inell Borjas tape]    Social History:   Social History     Socioeconomic History    Marital status:      Spouse name: Not on file    Number of children: Not on file    Years of education: Not on file    Highest education level: Not on file   Occupational History    Not on file   Tobacco Use    Smoking status: Never Smoker    Smokeless tobacco: Former User     Types: Chew   Vaping Use    Vaping Use: Never used   Substance and Sexual Activity    Alcohol use: Yes     Alcohol/week: 2.0 standard drinks     Types: 2 Cans of beer per week    Drug use: No    Sexual activity: Not Currently   Other Topics Concern    Not on file   Social History Narrative    Not on file     Social Determinants of Health     Financial Resource Strain:     Difficulty of Paying Living Expenses: Not on file   Food Insecurity:     Worried About Running Out of Food in the Last Year: Not on file    [place] of Food in the Last Year: Not on file   Transportation Needs:     Lack of Transportation (Medical): Not on file    Lack of Transportation (Non-Medical):  Not on file   Physical Activity:     Days of Exercise per Week: Not on file    Minutes of Exercise per Session: Not on file   Stress:     Feeling of Stress : Not on file   Social Connections:     Frequency of Communication with Friends and Family: Not on file    Frequency of Social Gatherings with Friends and Family: Not on file    Attends Nondenominational Services: Not on file    Active Member of Clubs or Organizations: Not on file    Attends Club or Organization Meetings: Not on file    Marital Status: Not on file   Intimate Partner Violence:     Fear of Current or Ex-Partner: Not on file    Emotionally Abused: Not on file    Physically Abused: Not on file    Sexually Abused: Not on file   Housing Stability:     Unable to Pay for Housing in the Last Year: Not on file    Number of Places Lived in the Last Year: Not on file    Unstable Housing in the Last Year: Not on file       Family History:   Family History   Problem Relation Age of Onset    Stroke Maternal Grandmother     Stroke Maternal Grandfather     Other Mother         AORTIC BYPASS LED TO KIDNEY FAILURE    Kidney Disease Mother     Diabetes Father     Stroke Father        Review of Systems:    Constitutional: No fever or chills  HEENT:  No headache or blurring of vision   cardiac:  No chest pain or orthopnea. Chest:   No cough or wheezing  Abdomen:  No abdominal pain  Neuro:  No focal neurological deficit   skin:   No rashes, no itching. :   No hematuria, no pyuria, no dysuria, no flank pain. Extremities:  No leg swelling.       Objective:  CURRENT TEMPERATURE:  Temp: 98.8 °F (37.1 °C)  MAXIMUM TEMPERATURE OVER 24HRS:  Temp (24hrs), Av.8 °F (36.6 °C), Min:97 °F (36.1 °C), Max:98.8 °F (37.1 °C)    CURRENT RESPIRATORY RATE:  Resp: 21  CURRENT PULSE:  Pulse: 110  CURRENT BLOOD PRESSURE:  BP: (!) 153/97  24HR BLOOD PRESSURE RANGE:  Systolic (79YTT), US , Min:106 , TRW:120   ; Diastolic (90BQW), NMZ:13, Min:67, Max:97    24HR INTAKE/OUTPUT:      Intake/Output Summary (Last 24 hours) at 2022 1320  Last data filed at 2022 7049  Gross per 24 hour   Intake 25 ml   Output 3300 ml   Net -3275 ml     Patient Vitals for the past 96 hrs (Last 3 readings):   Weight   22 1508 278 lb (126.1 kg)     Physical Exam:  General appearance: Awake and alert x3  Skin: Warm to touch  Eyes: Conjunctiva was pink  Neck: No carotid bruit   pulmonary: Bilateral air entry and clear to auscultation  Cardiovascular: S1 and S2 audible no S3   abdomen: Soft and nontender   extremities: No edema    Labs:   CBC:  Recent Labs     22  1500 22  1500 22  1612 22  0655 22  0861 WBC 12.5*  --  9.5 8.9  --    RBC 3.85*  --  3.52* 3.52*  --    HGB 11.6*   < > 10.6* 10.7* 10.5*   HCT 35.5*   < > 32.4* 33.2* 31.3*   MCV 92.2  --  92.0 94.3  --    MCH 30.1  --  30.1 30.4  --    MCHC 32.7  --  32.7 32.2  --    RDW 14.2  --  14.4 14.5*  --      --  204 187  --    MPV 11.7  --  10.5 10.2  --     < > = values in this interval not displayed. BMP:   Recent Labs     01/04/22  1612 01/05/22  0134 01/05/22  0853   * 132* 133*   K 6.4* 5.5* 4.8    101 105   CO2 14* 20 15*   BUN 54* 45* 42*   CREATININE 2.32* 2.13* 2.07*   GLUCOSE 217* 366* 169*   CALCIUM 8.9 8.3* 8.9        Phosphorus:  No results for input(s): PHOS in the last 72 hours.   Magnesium:   Recent Labs     01/05/22  0853   MG 1.9     Albumin:   Recent Labs     01/04/22  1612 01/05/22  0853   LABALBU 4.2 3.8       IRON:    Lab Results   Component Value Date    IRON 20 09/05/2020     Iron Saturation:  No components found for: PERCENTFE  TIBC:    Lab Results   Component Value Date    TIBC 139 09/05/2020     FERRITIN:    Lab Results   Component Value Date    FERRITIN 1,431 09/05/2020     SPEP:   Lab Results   Component Value Date    PROT 7.0 01/05/2022     UPEP: No results found for: TPU     C3:   Lab Results   Component Value Date    C3 102 09/28/2020     C4:   Lab Results   Component Value Date    C4 20 09/28/2020   Urine Sodium:    Lab Results   Component Value Date    TRISH 70 01/04/2022    Urine Creatinine:    Lab Results   Component Value Date    LABCREA 109.9 08/06/2021   Urinalysis:  U/A:   Lab Results   Component Value Date    NITRU NEGATIVE 01/04/2022    COLORU Yellow 01/04/2022    PHUR 5.0 01/04/2022    WBCUA None 01/04/2022    RBCUA None 01/04/2022    MUCUS NOT REPORTED 01/04/2022    TRICHOMONAS NOT REPORTED 01/04/2022    YEAST NOT REPORTED 01/04/2022    BACTERIA NOT REPORTED 01/04/2022    SPECGRAV 1.020 01/04/2022    LEUKOCYTESUR NEGATIVE 01/04/2022    UROBILINOGEN Normal 01/04/2022    BILIRUBINUR NEGATIVE 01/04/2022    GLUCOSEU TRACE 01/04/2022    [address] NEGATIVE 01/04/2022    AMORPHOUS NOT REPORTED 01/04/2022         Radiology:  Reviewed as available. Assessment:  1. Hyperkalemia most likely due to increased absorption of potassium from the gut in the form of blood further complicated by decrease in renal function. His potassium is normal now. Other possibility include obstructive uropathy  2. 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 Baseline creatinine is 2-2 2.0 mg/dL. 3.  Hypertension  4. Status post nephrectomy and solitary functioning kidney  Plan:  1. Continue normal saline at 50 mL/h  2. BMP in a.m. 3.  If potassium remains stable patient can be potentially discharged  Check post void residual volume next  Thank you for the consultation. Please do not hesitate to call with questions.     Electronically signed by Sni Contreras MD on 1/5/2022 at 1:20 PM

## 2022-01-05 NOTE — OP NOTE
PCP in 1 to 2 weeks after discharge. Detailed Description of Procedure:   Informed consent was obtained from the patient after explanation of the procedure including indications, description of the procedure,  benefits and possible risks and complications of the procedure, and alternatives. Questions were answered. The patient's history was reviewed and a directed physical examination was performed prior to the procedure. Patient was monitored throughout the procedure with pulse oximetry and periodic assessment of vital signs. Patient was sedated as noted above. With the patient in the left lateral decubitus position, the Olympus videoendoscope was placed in the patient's mouth and under direct visualization passed into the esophagus. Visualization of the esophagus, stomach, and duodenum was performed during both introduction and withdrawal of the endoscope and retroflexed view of the proximal stomach was obtained. The scope was passed to the 2nd portion of the duodenum. The patient tolerated the procedure well and was taken to the recovery area in good condition. The patient  was taken to the recovery area in good condition.     Electronically signed by Michelle Stockton MD on 1/5/2022 at 2:27 PM

## 2022-01-05 NOTE — PLAN OF CARE
Problem: Falls - Risk of:  Goal: Will remain free from falls  Description: Will remain free from falls  Outcome: Ongoing  Goal: Absence of physical injury  Description: Absence of physical injury  Outcome: Ongoing     Problem: Sensory:  Goal: Ability to identify factors that increase the pain will improve  Description: Ability to identify factors that increase the pain will improve  Outcome: Ongoing  Goal: Ability to demonstrate ways to enhance comfort will improve  Description: Ability to demonstrate ways to enhance comfort will improve  Outcome: Ongoing  Goal: General experience of comfort will improve  Description: General experience of comfort will improve  Outcome: Ongoing     Problem:  Activity:  Goal: Risk for activity intolerance will decrease  Description: Risk for activity intolerance will decrease  Outcome: Ongoing     Problem: Coping:  Goal: Ability to adjust to condition or change in health will improve  Description: Ability to adjust to condition or change in health will improve  Outcome: Ongoing     Problem: Fluid Volume:  Goal: Ability to maintain a balanced intake and output will improve  Description: Ability to maintain a balanced intake and output will improve  Outcome: Ongoing     Problem: Health Behavior:  Goal: Ability to identify and utilize available resources and services will improve  Description: Ability to identify and utilize available resources and services will improve  Outcome: Ongoing  Goal: Ability to manage health-related needs will improve  Description: Ability to manage health-related needs will improve  Outcome: Ongoing     Problem: Health Behavior:  Goal: Ability to identify and utilize available resources and services will improve  Description: Ability to identify and utilize available resources and services will improve  Outcome: Ongoing  Goal: Ability to manage health-related needs will improve  Description: Ability to manage health-related needs will improve  Outcome: of nausea/vomiting  Outcome: Ongoing  Goal: Able to drink  Description: Able to drink  Outcome: Ongoing  Goal: Able to eat  Description: Able to eat  Outcome: Ongoing  Goal: Ability to achieve adequate nutritional intake will improve  Description: Ability to achieve adequate nutritional intake will improve  Outcome: Ongoing

## 2022-01-05 NOTE — H&P
@[facility]@    [address] [place] [address]    HISTORY AND PHYSICAL EXAMINATION            Date:   1/5/2022  Patient name:  Amalia Fields II  Date of admission:  1/4/2022  3:21 PM  MRN:   [id number]  Account:  [de-identified]  YOB: 1960  PCP:    TORREY Vazquez CNP  Room:   08/08  Code Status:    Prior    Chief Complaint:     Chief Complaint   Patient presents with    Rectal Bleeding    Discuss Labs       History Obtained From:     patient    History of Present Illness:     Amalia Fields II is a 59 y.o. Non- / non  male who presents with Rectal Bleeding and Discuss Labs   and is admitted to the hospital for the management of Gastrointestinal hemorrhage with melena. Patient has medical history significant for hypertension, hyperlipidemia, type 2 diabetes, history of colon and small bowel resection; presents with melena x1 day. Patient states that 5 days prior to admission he had noted some bloody stools that were cranberry color and appearance. Patient states he had several bouts of watery stools for several days. 1 day prior to admission, patient noted having a formed stool that appeared black in color. Patient states that he could see what look like blood coming off of the black stools while in the toilet. Patient reports that he became concerned and went to his primary doctor and blood work was done. Patient states that after blood work came back, he was instructed by his primary doctor to go into the hospital.  Patient presented to our ER and was found to have potassium of 6.4. Patient states he has been having issues with his potassium levels. Patient notes that he was supposed to have a recent hernia repair, but it was canceled secondary to his potassium being around 5.5. Patient states that anesthesiologist felt that patient would need to get his potassium levels down first before surgery.   Patient notes that he went to see a nephrologist and was placed on sodium bicarb along with Kimberly Janus. Patient states he was placed on a limited amount these medications and completed them recently. Patient states he was supposed to follow-up with nephrology in order to assess further treatment. Patient notes that he has had some shortness of breath when moving items which is not typical for him. Patient is also noted a decrease in his appetite lately. No fever, no chills, no URI or UTI type symptoms, no chest pain, no abdominal pain, no headaches. Patient reports that 1 year ago, he underwent exploratory laparotomy. Patient states that it was thought that patient had kidney cancer and on exploration, patient was found to have ischemia of a portion of his colon and small intestines. Patient states that his kidney was removed along with the ischemic part of his bowels. Patient notes that he had a colostomy placed and later had it reversed. Patient reports he ended up with a ventral hernia after surgical intervention. Past Medical History:     Past Medical History:   Diagnosis Date    Arthritis     BILATERAL KNEESand right shoulder    Back pain     Colostomy RUQ 09/23/2020    Frequent headaches     10/28/2020 PATIENT STATES EVERY OTHER DAY.  Gout     History of atrial fibrillation     AFTER SURGERY ON 09/23/2020 WENT INTO A FIB. CONVERTED BACK TO NORMAL RHYTHM ON HIS OWN. CARDIOLOGIST DR Perlita Duran History of blood transfusion     NO REACTION    [place] (hard of hearing)     HTN     Brittny Drake CNP    Hyperkalemia     Hyperlipidemia     Incisional hernia     Kidney infection     Sepsis ([address]     post surgery. leak in bowel    Sleep apnea     USES CPAP MACHINE    T2DM     K. Mary Walton CNP.  diet controlled    Tooth missing     RIGHT UPPER 2ND MOLAR    Under care of team 08/2021    Dr. Herminia Chawla Wears glasses     READING    Wellness examination 07/2021    Faviola Zavala [address] Past Surgical History:     Past Surgical History:   Procedure Laterality Date    ADENOIDECTOMY      TWICE AS A CHILD    ANKLE SURGERY Right 2010    RUPTURED ACHILES    CARPAL TUNNEL RELEASE Bilateral     COLOSTOMY      temporary    CYSTO/URETERO/PYELOSCOPY, CALCULUS TX Right 10/30/2020    HOLMIUM-STANDBY, CYSTOSCOPY, URETEROSCOPY, STENT EXCHANGE performed by Marianne Post MD at [address] Right 09/01/2020    CYSTOSCOPY RIGHT URETERAL STENT INSERTION performed by Eva Girard MD at [address] CATH POWER PICC TRIPLE  09/03/2020    REMOVED AROUND 10/07/2020    KIDNEY SURGERY Left 09/02/2020    LAPAROSCOPIC XI ROBOTIC NEPHRECTOMY performed by Tony Telles MD at [facility] ARTHROSCOPY Bilateral     LAPAROTOMY N/A 09/23/2020    LAPAROTOMY EXPLORATORY performed by Ana Maria Ayala DO at [address] [place] [address] N/A 09/02/2020    EXPLORATORY LAPAROTOMY, RESECTION OF PROXIMAL JEJUNUM AND DESCENDING COLON WITH MOBILIZATION OF SPLENIC FLEXURE AND PRIMARY ANASTAMOSISOF SMALL BOWEL AND COLON, PLACEMENT OF GASTROSTOMY TUBE performed by Ana Maria Ayala DO at [address] 12/18/2020    EXPLORATORY LAPAROTOMY,COLOSTOMY REVERSAL, TAP BLOCK DONE IN OR BY DR Marcial Ruiz performed by Ana Maria Ayala DO at [address] [place] [address] 3 TIMES  AS A CHILD    TYMPANOSTOMY TUBE PLACEMENT          Medications Prior to Admission:     Prior to Admission medications    Medication Sig Start Date End Date Taking?  Authorizing Provider   allopurinol (ZYLOPRIM) 100 MG tablet TAKE 1 TABLET BY MOUTH EVERY DAY 12/15/21   TORREY Cobb CNP   DULoxetine (CYMBALTA) 20 MG extended release capsule TAKE 1 CAPSULE BY MOUTH EVERY DAY 12/3/21   TORREY Cobb CNP   cyclobenzaprine (FLEXERIL) 10 MG tablet TAKE 1 TABLET BY MOUTH 2 TIMES DAILY AS NEEDED FOR MUSCLE SPASMS 12/3/21   TORREY Cobb CNP   sodium bicarbonate 650 MG tablet  10/12/21 Historical Provider, MD   CLENPIQ 10-3.5-12 MG-GM -GM/160ML SOLN AS DIRECTED 9/2/21   Historical Provider, MD   metoprolol (LOPRESSOR) 100 MG tablet Take 100 mg by mouth 2 times daily  11/3/21   Historical Provider, MD   zolpidem (AMBIEN CR) 12.5 MG extended release tablet Take 1 tablet by mouth nightly as needed for Sleep for up to 90 days.  10/11/21 1/9/22  TORREY Lopez CNP   hydrOXYzine (ATARAX) 50 MG tablet TAKE 1 TABLET BY MOUTH EVERY DAY AT NIGHT 9/6/21   TORREY Lopez CNP   dilTIAZem (CARDIZEM) 60 MG tablet Take 1 tablet by mouth every 12 hours 5/12/21   TORREY Lopez CNP   blood glucose test strips (TRUE METRIX BLOOD GLUCOSE TEST) strip PLEASE SEE ATTACHED FOR DETAILED DIRECTIONS 1/25/21   TORREY Lopez CNP   ferrous sulfate (IRON 325) 325 (65 Fe) MG tablet Take 1 tablet by mouth daily (with breakfast) 12/28/20   TORREY Lopez CNP   hydrALAZINE (APRESOLINE) 25 MG tablet Take 1 tablet by mouth 3 times daily  Patient taking differently: Take 25 mg by mouth 2 times daily  12/28/20 12/21/21  TORREY Lopez CNP   apixaban (ELIQUIS) 5 MG TABS tablet Take 1 tablet by mouth 2 times daily  Patient not taking: Reported on 1/3/2022 12/2/20   TORREY Lopez CNP   CPAP Machine MISC use as directed    Historical Provider, MD   Respiratory Therapy Supplies (CARETOUCH CPAP & BIPAP HOSE) MISC Mask and Tubing 1/28/20   Historical Provider, MD   Melatonin 10 MG TABS Take 1 tablet by mouth nightly as needed    Historical Provider, MD   Multiple Vitamins-Minerals (THERAPEUTIC MULTIVITAMIN-MINERALS) tablet Take 1 tablet by mouth daily 10/3/20   Kaykay Charles MD   vitamin C (ASCORBIC ACID) 500 MG tablet Take 500 mg by mouth daily    Historical Provider, MD   acetaminophen (TYLENOL) 500 MG tablet Take 2 tablets by mouth every 6 hours as needed for Pain 8/11/20 12/21/21  Michael King MD   aspirin 81 MG tablet Take 81 mg by mouth nightly Stopped 12/14/2020    Historical Provider, MD   pravastatin (PRAVACHOL) 20 MG tablet Take 20 mg by mouth daily    Historical Provider, MD        Allergies:     Ampicillin, Pcn [penicillins], Sulfa antibiotics, and Tape [adhesive tape]    Social History:     Tobacco:    reports that he has never smoked. He quit smokeless tobacco use about 42 years ago. His smokeless tobacco use included chew. Alcohol:      reports current alcohol use of about 2.0 standard drinks of alcohol per week. Drug Use:  reports no history of drug use. Family History:     Family History   Problem Relation Age of Onset    Stroke Maternal Grandmother     Stroke Maternal Grandfather     Other Mother         AORTIC BYPASS LED TO KIDNEY FAILURE    Kidney Disease Mother     Diabetes Father     Stroke Father        Review of Systems:     Review of Systems   Constitutional: Positive for activity change and appetite change. Negative for chills, fatigue and fever. HENT: Positive for sore throat. Negative for congestion, ear pain, postnasal drip, rhinorrhea, sinus pressure, sinus pain and sneezing. Eyes: Negative for itching. Respiratory: Positive for shortness of breath. Negative for cough and wheezing. Cardiovascular: Positive for chest pain. Negative for leg swelling. Gastrointestinal: Positive for blood in stool and diarrhea. Negative for abdominal pain, constipation, nausea and vomiting. Genitourinary: Negative for decreased urine volume, difficulty urinating, dysuria, flank pain, frequency, hematuria and urgency. Musculoskeletal: Positive for arthralgias, back pain and myalgias. Neurological: Negative for dizziness and headaches. Physical Exam:   /76   Pulse 63   Temp 97 °F (36.1 °C) (Oral)   Resp 16   Ht 5' 11\" (1.803 m)   Wt 278 lb (126.1 kg)   SpO2 99%   BMI 38.77 kg/m²   Temp (24hrs), Av °F (36.1 °C), Min:97 °F (36.1 °C), Max:97 °F (36.1 °C)    No results for input(s): POCGLU in the last 72 hours.     Intake/Output Summary (Last 24 hours) at 1/5/2022 0132  Last data filed at 1/5/2022 0039  Gross per 24 hour   Intake --   Output 1000 ml   Net -1000 ml     Physical Exam  Constitutional:       General: He is not in acute distress. Appearance: Normal appearance. He is obese. HENT:      Head: Normocephalic and atraumatic. Right Ear: External ear normal.      Nose: Nose normal.      Mouth/Throat:      Pharynx: Oropharynx is clear. Eyes:      Extraocular Movements: Extraocular movements intact. Pupils: Pupils are equal, round, and reactive to light. Cardiovascular:      Rate and Rhythm: Normal rate and regular rhythm. Pulses: Normal pulses. Heart sounds: Normal heart sounds. No murmur heard. No gallop. Pulmonary:      Effort: Pulmonary effort is normal. No respiratory distress. Breath sounds: Normal breath sounds. No wheezing, rhonchi or rales. Abdominal:      General: Bowel sounds are normal. There is no distension. Tenderness: There is no abdominal tenderness. Hernia: A hernia is present. Comments: Large ventral hernia   Musculoskeletal:         General: No swelling. Normal range of motion. Cervical back: Normal range of motion and neck supple. Neurological:      General: No focal deficit present. Mental Status: He is alert.          Investigations:      Laboratory Testing:  Recent Results (from the past 24 hour(s))   CBC Auto Differential    Collection Time: 01/04/22  4:12 PM   Result Value Ref Range    WBC 9.5 3.5 - 11.3 k/uL    RBC 3.52 (L) 4.21 - 5.77 m/uL    Hemoglobin 10.6 (L) 13.0 - 17.0 g/dL    Hematocrit 32.4 (L) 40.7 - 50.3 %    MCV 92.0 82.6 - 102.9 fL    MCH 30.1 25.2 - 33.5 pg    MCHC 32.7 28.4 - 34.8 g/dL    RDW 14.4 11.8 - 14.4 %    Platelets 166 440 - 797 k/uL    MPV 10.5 8.1 - 13.5 fL    NRBC Automated 0.0 0.0 per 100 WBC    Differential Type NOT REPORTED     Seg Neutrophils 59 36 - 65 %    Lymphocytes 27 24 - 43 %    Monocytes 8 3 - 12 %    Eosinophils % 3 1 - 4 %    Basophils 1 0 - 2 %    Immature Granulocytes 2 (H) 0 %    Segs Absolute 5.59 1.50 - 8.10 k/uL    Absolute Lymph # 2.56 1.10 - 3.70 k/uL    Absolute Mono # 0.79 0.10 - 1.20 k/uL    Absolute Eos # 0.32 0.00 - 0.44 k/uL    Basophils Absolute 0.06 0.00 - 0.20 k/uL    Absolute Immature Granulocyte 0.16 0.00 - 0.30 k/uL    WBC Morphology NOT REPORTED     RBC Morphology NOT REPORTED     Platelet Estimate NOT REPORTED    Comprehensive Metabolic Panel w/ Reflex to MG    Collection Time: 01/04/22  4:12 PM   Result Value Ref Range    Glucose 217 (H) 70 - 99 mg/dL    BUN 54 (H) 8 - 23 mg/dL    CREATININE 2.32 (H) 0.70 - 1.20 mg/dL    Bun/Cre Ratio NOT REPORTED 9 - 20    Calcium 8.9 8.6 - 10.4 mg/dL    Sodium 127 (L) 135 - 144 mmol/L    Potassium 6.4 (HH) 3.7 - 5.3 mmol/L    Chloride 102 98 - 107 mmol/L    CO2 14 (L) 20 - 31 mmol/L    Anion Gap 11 9 - 17 mmol/L    Alkaline Phosphatase 83 40 - 129 U/L    ALT 38 5 - 41 U/L    AST 31 <40 U/L    Total Bilirubin 0.32 0.3 - 1.2 mg/dL    Total Protein 7.2 6.4 - 8.3 g/dL    Albumin 4.2 3.5 - 5.2 g/dL    Albumin/Globulin Ratio 1.4 1.0 - 2.5    GFR Non-African American 29 (L) >60 mL/min    GFR  35 (L) >60 mL/min    GFR Comment          GFR Staging NOT REPORTED    Troponin    Collection Time: 01/04/22  4:12 PM   Result Value Ref Range    Troponin, High Sensitivity 59 (HH) 0 - 22 ng/L    Troponin T NOT REPORTED <0.03 ng/mL    Troponin Interp NOT REPORTED    Brain Natriuretic Peptide    Collection Time: 01/04/22  4:12 PM   Result Value Ref Range    Pro-BNP 88 <300 pg/mL    BNP Interpretation NOT REPORTED    SODIUM, URINE, RANDOM    Collection Time: 01/04/22  7:57 PM   Result Value Ref Range    Sodium,Ur 70 mmol/L   CHLORIDE, URINE, RANDOM    Collection Time: 01/04/22  7:57 PM   Result Value Ref Range    Chloride, Ur 82 mmol/L   Urinalysis with Microscopic    Collection Time: 01/04/22  7:57 PM   Result Value Ref Range    Color, UA Yellow Yellow    Turbidity UA Clear Clear    Glucose, Ur TRACE (A) NEGATIVE    Bilirubin Urine NEGATIVE NEGATIVE    Ketones, Urine NEGATIVE NEGATIVE    Specific Gravity, UA 1.020 1.005 - 1.030    Urine Hgb NEGATIVE NEGATIVE    pH, UA 5.0 5.0 - 8.0    Protein, UA 2+ (A) NEGATIVE    Urobilinogen, Urine Normal Normal    Nitrite, Urine NEGATIVE NEGATIVE    Leukocyte Esterase, Urine NEGATIVE NEGATIVE    -          WBC, UA None 0 - 5 /HPF    RBC, UA None 0 - 4 /HPF    Casts UA NOT REPORTED 0 - 8 /LPF    Crystals, UA NOT REPORTED None /HPF    Epithelial Cells UA 0 TO 2 0 - 5 /HPF    Renal Epithelial, UA NOT REPORTED 0 /HPF    Bacteria, UA NOT REPORTED None    Mucus, UA NOT REPORTED None    Trichomonas, UA NOT REPORTED None    Amorphous, UA NOT REPORTED None    Other Observations UA NOT REPORTED NOT REQ. Yeast, UA NOT REPORTED None       Imaging/Diagnostics:  XR CHEST (2 VW)    Result Date: 1/4/2022  No acute airspace disease identified. US RENAL COMPLETE    Result Date: 1/4/2022  Unremarkable appearing right kidney and bladder RECOMMENDATIONS: Unavailable       Assessment :      Hospital Problems           Last Modified POA    * (Principal) Gastrointestinal hemorrhage with melena 1/5/2022 Yes    Acute kidney injury superimposed on CKD ([facility] 1/5/2022 Yes    Acute blood loss anemia 1/5/2022 Yes    Hyperkalemia 1/5/2022 Yes    Type 2 diabetes mellitus without complication ([address] 7/6/4061 Yes    Essential hypertension 1/5/2022 Yes    Morbid obesity due to excess calories ([facility] 1/5/2022 Yes          Plan:     Patient status inpatient in the Med/Surge    1. GI bleed with melena-patient reports having black stools x1 day. Patient states that he has noted blood in his stool for the past 5 days. Patient does have a history of having a portion of his intestines removed. Patient had undergone a colostomy at the time and eventually a reversal.  Patient currently on Eliquis secondary to atrial fibrillation. Patient does have a hemoglobin that has dropped.   We will do every 6 hemoglobins. GI will be consulted. Patient Eliquis will be held for now. 2. Hyperkalemia-patient noted to have a potassium of 6.4 in the ER. Patient has been having issues with his potassium in the past and states that he has been seeing a nephrologist to treat his high potassium. Patient's high potassium is most likely secondary to patient's renal failure. Patient given Mika Polka in the ED. We will follow patient's potassium during hospitalization. 3. Acute on chronic kidney disease-patient noted to have chronic kidney disease secondary to undergoing nephrectomy 1 year ago. Patient currently with one functional kidney. Patient's kidney function has worsened recently. This may be secondary to patient's current GI bleed. Patient will be given gentle IV fluid hydration. Nephrology has been consulted. 4. Type 2 diabetes-patient with history of type 2 diabetes. Patient will be placed on insulin sliding scale. Accu-Cheks as scheduled. 5. Atrial fibrillation-patient with history of atrial fibrillation. Patient will be continued on his home Cardizem for rate control. Patient metoprolol currently on hold secondary to low blood pressure. Patient's Eliquis will also be held secondary to GI bleed. 6. Essential hypertension-patient with history of hypertension. Patient's blood pressure currently on the softer side. This may be secondary to underlying GI bleed. Patient's blood pressure medications will be held for now. Blood pressure will be monitored during hospitalization. 7. Morbid obesity-patient with BMI of 38.77 with comorbidities of hypertension and diabetes.     Consultations:   IP CONSULT TO NEPHROLOGY  IP CONSULT TO HOSPITALIST  IP CONSULT TO NEPHROLOGY    Patient is admitted as inpatient status because of co-morbidities listed above, severity of signs and symptoms as outlined, requirement for current medical therapies and most importantly because of direct risk to patient if care not provided in a hospital setting. Expected length of stay > 48 hours.     Jose Hoyt MD  1/5/2022  1:32 AM    Copy sent to TORREY Pratt - CNP

## 2022-01-05 NOTE — CARE COORDINATION
Case Management Initial Discharge Plan  Kirti Manriquez II,             Met with:patient to discuss discharge plans. Information verified: address, contacts, phone number, , insurance Yes  Insurance Provider: [employer]     Emergency Contact/Next of Kin name & number: Loc Joel, SPouse [phone]  Who are involved in patient's support system? Spouse and family members     PCP: TORREY Ayoub CNP  Date of last visit: last Monday       Discharge Planning    Living Arrangements:  Spouse/Significant Other     Home has 1 stories  2 stairs to climb to get into front door, na stairs to climb to reach second floor  Location of bedroom/bathroom in home main level     Patient able to perform ADL's:Independent    Current Services (outpatient & in home) none   DME equipment: na   DME provider: na    Is patient receiving oral anticoagulation therapy? Yes - not currently due to condition     If indicated:   Physician managing anticoagulation treatment: cardiology   Where does patient obtain lab work for ATC treatment? Na       Potential Assistance Needed:       Patient agreeable to home care: No  [place] of choice provided:  n/a    Prior SNF/Rehab Placement and Facility: none   Agreeable to SNF/Rehab: No  [place] of choice provided: n/a     Evaluation: no    Expected Discharge date:       Patient expects to be discharged to: If home: is the family and/or caregiver wiling & able to provide support at home? Yes   Who will be providing this support? Spouse     Follow Up Appointment: Best Day/ Time:      Transportation provider: spouse   Transportation arrangements needed for discharge: No    Readmission Risk              Risk of Unplanned Readmission:  16             Does patient have a readmission risk score greater than 14?: No  If yes, follow-up appointment must be made within 7 days of discharge.      Goals of Care: stop bleeding and decrease K      Educated patient  on transitional options, provided freedom of choice and are agreeable with plan      Discharge Plan: Home independently           Electronically signed by Laina King RN on 1/5/22 at 9:41 AM EST

## 2022-01-05 NOTE — ANESTHESIA PRE PROCEDURE
Department of Anesthesiology  Preprocedure Note       Name:  Aimee Hayden II   Age:  59 y.o.  :  1960                                          MRN:  [id number]         Date:  2022      Surgeon: Rose Mosqueda):  Rosamaria Hutson MD    Procedure: Procedure(s):  EGD DIAGNOSTIC ONLY    Medications prior to admission:   Prior to Admission medications    Medication Sig Start Date End Date Taking? Authorizing Provider   sodium zirconium cyclosilicate (LOKELMA) 10 g PACK oral suspension Take 10 g by mouth daily    Historical Provider, MD   allopurinol (ZYLOPRIM) 100 MG tablet TAKE 1 TABLET BY MOUTH EVERY DAY 12/15/21   TORREY Bal CNP   cyclobenzaprine (FLEXERIL) 10 MG tablet TAKE 1 TABLET BY MOUTH 2 TIMES DAILY AS NEEDED FOR MUSCLE SPASMS 12/3/21   TORREY Bal CNP   metoprolol (LOPRESSOR) 100 MG tablet Take 100 mg by mouth 2 times daily  11/3/21   Historical Provider, MD   zolpidem (AMBIEN CR) 12.5 MG extended release tablet Take 1 tablet by mouth nightly as needed for Sleep for up to 90 days.  10/11/21 1/9/22  TORREY Bal CNP   hydrOXYzine (ATARAX) 50 MG tablet TAKE 1 TABLET BY MOUTH EVERY DAY AT NIGHT  Patient taking differently: 50 mg  21   TORREY Bal CNP   dilTIAZem (CARDIZEM) 60 MG tablet Take 1 tablet by mouth every 12 hours 21   TORREY Bal CNP   blood glucose test strips (TRUE METRIX BLOOD GLUCOSE TEST) strip PLEASE SEE ATTACHED FOR DETAILED DIRECTIONS 21   TORREY Bal CNP   ferrous sulfate (IRON 325) 325 (65 Fe) MG tablet Take 1 tablet by mouth daily (with breakfast) 20   TORREY Bal CNP   apixaban (ELIQUIS) 5 MG TABS tablet Take 1 tablet by mouth 2 times daily 20   TORREY Bal CNP   CPAP Machine MISC use as directed    Historical Provider, MD   Respiratory Therapy Supplies (CARETOUCH CPAP & BIPAP HOSE) MISC Mask and Tubing 20   Historical Provider, MD   Melatonin 10 MG TABS Take 1 tablet by mouth nightly as needed    Historical Provider, MD   Multiple Vitamins-Minerals (THERAPEUTIC MULTIVITAMIN-MINERALS) tablet Take 1 tablet by mouth daily 10/3/20   Ayesha Mejias MD   vitamin C (ASCORBIC ACID) 500 MG tablet Take 500 mg by mouth daily    Historical Provider, MD   acetaminophen (TYLENOL) 500 MG tablet Take 2 tablets by mouth every 6 hours as needed for Pain 8/11/20 12/21/21  Benji Worthy MD   aspirin 81 MG tablet Take 81 mg by mouth nightly Stopped 12/14/2020    Historical Provider, MD   pravastatin (PRAVACHOL) 20 MG tablet Take 20 mg by mouth daily    Historical Provider, MD       Current medications:    No current facility-administered medications for this visit. No current outpatient medications on file.      Facility-Administered Medications Ordered in Other Visits   Medication Dose Route Frequency Provider Last Rate Last Admin    dilTIAZem (CARDIZEM) tablet 60 mg  60 mg Oral Q12H Silvina Cat Spring, APRN - CNP   60 mg at 01/05/22 1125    ferrous sulfate (FE TABS 325) EC tablet 325 mg  325 mg Oral Daily with breakfast Silvina Cat Spring, APRN - CNP   325 mg at 01/05/22 1125    hydrOXYzine (ATARAX) tablet 50 mg  50 mg Oral Nightly PRN Silvina Cat Spring, APRN - CNP        sodium chloride flush 0.9 % injection 5-40 mL  5-40 mL IntraVENous 2 times per day Silvina Cat Spring, APRN - CNP        sodium chloride flush 0.9 % injection 5-40 mL  5-40 mL IntraVENous PRN Silvina Cat Spring, APRN - CNP   10 mL at 01/05/22 1215    0.9 % sodium chloride infusion  25 mL IntraVENous PRN Silvina Cat Spring, APRN - CNP        ondansetron (ZOFRAN-ODT) disintegrating tablet 4 mg  4 mg Oral Q8H PRN Silvina Cat Spring, APRN - CNP        Or    ondansetron [facility]) injection 4 mg  4 mg IntraVENous Q6H PRN Silvina Cat Spring, APRN - CNP        acetaminophen (TYLENOL) tablet 650 mg  650 mg Oral Q6H PRN Silvina Cat Spring, APRN - CNP   650 mg at 01/05/22 0123    Or    acetaminophen (TYLENOL) suppository 650 mg  650 mg Rectal Q6H PRN Silvina Cat Spring, APRN - CNP        sodium zirconium cyclosilicate (LOKELMA) oral suspension 10 g  10 g Oral TID Lavauruslann Black, APRN - CNP   10 g at 01/05/22 1125    sodium bicarbonate 150 mEq in dextrose 5 % 1,000 mL infusion   IntraVENous Continuous Lavaughn Black, APRN - CNP 50 mL/hr at 01/05/22 0715 Rate Verify at 01/05/22 0715       Allergies: Allergies   Allergen Reactions    Ampicillin Swelling     Swelling of throat.  Pcn [Penicillins] Swelling     Throat swelling. Tolerated cefepime during 8/31/20 admission.  Sulfa Antibiotics      Other reaction(s): Unknown    Tape [Adhesive Tape] Other (See Comments)     CAUSES REDNESS. PAPER TAPE OK. Problem List:    Patient Active Problem List   Diagnosis Code    Cellulitis L03.90    Type 2 diabetes mellitus without complication ([facility]) L98.7    Essential hypertension I10    Morbid obesity due to excess calories ([facility]) E66.01    Chronic kidney disease (CKD) N18.9    DEBBI (acute kidney injury) ([facility] N17.9    Bowel obstruction ([facility]) K56.609    Small bowel obstruction (HCC) K56.609    Moderate malnutrition ([address] E44.0    Hyperglycemia R73.9    Sleep apnea G47.30    Colostomy in place [facility]) Z93.3    Incisional hernia, without obstruction or gangrene K43.2    Acute kidney injury superimposed on CKD ([facility] N17.9, N18.9    Gastrointestinal hemorrhage with melena K92.1    Acute blood loss anemia D62    Hyperkalemia E87.5    Gastrointestinal hemorrhage K92.2       Past Medical History:        Diagnosis Date    Arthritis     BILATERAL KNEESand right shoulder    Back pain     Colostomy RUQ 09/23/2020    Frequent headaches     10/28/2020 PATIENT STATES EVERY OTHER DAY.  Gout     History of atrial fibrillation     AFTER SURGERY ON 09/23/2020 WENT INTO A FIB. CONVERTED BACK TO NORMAL RHYTHM ON HIS OWN.  CARDIOLOGIST DR Sarai Hutchison History of blood transfusion     NO REACTION    [place] (hard of hearing)     HTN     Victory Distad, CNP    Hyperkalemia     Hyperlipidemia     Incisional hernia     Kidney infection     Sepsis ([facility]     post surgery. leak in bowel    Sleep apnea     USES CPAP MACHINE    T2DM     K. Casandra Rodriguez CNP. diet controlled    Tooth missing     RIGHT UPPER 2ND MOLAR    Under care of team 08/2021    Dr. Steve Willson Wears glasses     READING    Wellness examination 07/2021    Yu Olvera CNP [place] [facility]        Past Surgical History:        Procedure Laterality Date    ADENOIDECTOMY      TWICE AS A CHILD    ANKLE SURGERY Right 2010    RUPTURED ACHILES    CARPAL TUNNEL RELEASE Bilateral     COLOSTOMY      temporary    CYSTO/URETERO/PYELOSCOPY, CALCULUS TX Right 10/30/2020    HOLMIUM-STANDBY, CYSTOSCOPY, URETEROSCOPY, STENT EXCHANGE performed by Elsie Ruelas MD at [address] [place] Right 09/01/2020    CYSTOSCOPY RIGHT URETERAL STENT INSERTION performed by Haile Villeda MD at [address] [facility] CATH POWER PICC TRIPLE  09/03/2020    REMOVED AROUND 10/07/2020    KIDNEY SURGERY Left 09/02/2020    LAPAROSCOPIC XI ROBOTIC NEPHRECTOMY performed by Rosa Greer MD at [facility] ARTHROSCOPY Bilateral     LAPAROTOMY N/A 09/23/2020    LAPAROTOMY EXPLORATORY performed by Gregory Hanson DO at [address] [place] [address] N/A 09/02/2020    EXPLORATORY LAPAROTOMY, RESECTION OF PROXIMAL JEJUNUM AND DESCENDING COLON WITH MOBILIZATION OF SPLENIC FLEXURE AND PRIMARY ANASTAMOSISOF SMALL BOWEL AND COLON, PLACEMENT OF GASTROSTOMY TUBE performed by Gregory Hanson DO at [address] [place] [address] N/A 12/18/2020    EXPLORATORY LAPAROTOMY,COLOSTOMY REVERSAL, TAP BLOCK DONE IN OR BY DR Tea Pollard performed by Gregory Hanson DO at [address] [place] [address] 3 TIMES  AS A CHILD    TYMPANOSTOMY TUBE PLACEMENT         Social History:    Social History     Tobacco Use    Smoking status: Never Smoker    Smokeless tobacco: Former User     Types: Chew   Substance Use Topics    Alcohol use:  Yes     Alcohol/week: 2.0 standard drinks Types: 2 Cans of beer per week                                Counseling given: Not Answered      Vital Signs (Current): There were no vitals filed for this visit.                                            BP Readings from Last 3 Encounters:   01/05/22 132/87   01/03/22 115/74   12/21/21 132/84       NPO Status:                                                                                 BMI:   Wt Readings from Last 3 Encounters:   01/04/22 278 lb (126.1 kg)   01/03/22 281 lb (127.5 kg)   12/21/21 289 lb (131.1 kg)     There is no height or weight on file to calculate BMI.    CBC:   Lab Results   Component Value Date    WBC 8.9 01/05/2022    RBC 3.52 01/05/2022    HGB 10.5 01/05/2022    HCT 31.3 01/05/2022    MCV 94.3 01/05/2022    RDW 14.5 01/05/2022     01/05/2022       CMP:   Lab Results   Component Value Date     01/05/2022    K 4.8 01/05/2022     01/05/2022    CO2 15 01/05/2022    BUN 42 01/05/2022    CREATININE 2.07 01/05/2022    GFRAA 40 01/05/2022    LABGLOM 33 01/05/2022    GLUCOSE 169 01/05/2022    PROT 7.0 01/05/2022    CALCIUM 8.9 01/05/2022    BILITOT 0.34 01/05/2022    ALKPHOS 74 01/05/2022    AST 28 01/05/2022    ALT 36 01/05/2022       POC Tests:   Recent Labs     01/05/22  0859   POCGLU 170*       Coags:   Lab Results   Component Value Date    PROTIME 11.2 01/05/2022    INR 1.1 01/05/2022    APTT 72.6 10/01/2020       HCG (If Applicable): No results found for: PREGTESTUR, PREGSERUM, HCG, HCGQUANT     ABGs:   Lab Results   Component Value Date    PHART 7.363 09/23/2020    PO2ART 187.0 09/23/2020    RCZ1NLZ 34.3 09/23/2020    NAU0JON 19.0 09/23/2020    U9ZTTMGV 99.3 09/23/2020        Type & Screen (If Applicable):  No results found for: LABABO, [address]    Drug/Infectious Status (If Applicable):  Lab Results   Component Value Date    HEPCAB NONREACTIVE 11/10/2020       COVID-19 Screening (If Applicable):   Lab Results   Component Value Date    COVID19 DETECTED 12/06/2021 COVID19 Not Detected 10/26/2020       TTE 9/2020  Left ventricle is normal in size. Mild left ventricular hypertrophy. Global left ventricular systolic function is normal with an estimated  ejection fraction of 60 % . Due to the technical limitations of this study not all wall segments were  visualized. No significant valvular regurgitation or stenosis seen. Large pleural effusion. Anesthesia Evaluation  Patient summary reviewed no history of anesthetic complications:   Airway: Mallampati: II  TM distance: >3 FB   Neck ROM: full  Mouth opening: > = 3 FB Dental:    (+) caps      Pulmonary:Negative Pulmonary ROS and normal exam    (+) sleep apnea: on CPAP,                             Cardiovascular:    (+) hypertension: no interval change,       ECG reviewed  Rhythm: regular  Rate: normal  Echocardiogram reviewed                  Neuro/Psych:   Negative Neuro/Psych ROS  (+) headaches:,             GI/Hepatic/Renal: Neg GI/Hepatic/Renal ROS            Endo/Other:    (+) DiabetesType II DM, poorly controlled, , malignancy/cancer. Abdominal:   (+) obese,           Vascular: negative vascular ROS. Other Findings:               Anesthesia Plan      MAC     ASA 3       Induction: intravenous. MIPS: Prophylactic antiemetics administered. Anesthetic plan and risks discussed with patient. Plan discussed with CRNA.                   Blanca Smith MD   1/5/2022

## 2022-01-05 NOTE — CONSULTS
[facility]  Consultation Note     . REASON FOR CONSULTATION:    Melena    HISTORY OF PRESENT ILLNESS:     This is a 59 y.o. male with PMH including HTN, HLD, DM type 2, colonic and small bowel resection due to ischemia and nephrectomy who presents with complaints of hematochezia. Pt states that Weds he had diarrhea and hematochezia and has repeated episodes daily since. No abdominal pain, nausea, vomiting, abdominal cramping, fevers or chills. Patient states he drinks beer 3 times weekly-no hard liquor, no smoking and no drug use. Patient states that he takes 2 Aleve tablets every night at bedtime to help him sleep  Of note patient also had Covid in December    Summary of imaging completed at this time:  No abdominal imaging completed at this time      Summary of abnormal labs completed at this time:    Metabolic: Sodium 666, potassium 4.8, BUN 42, creatinine 2.07  Hematology: WBCs 8.9 hemoglobin 10.7, platelets 566,   Coags: INR 1.1  Liver profile: WNL    Previous GI history:   No previous EGD or colonoscopy   no personal or family history of GI malignancy      Past Medical/Social/Family History:  Past Medical History:   Diagnosis Date    Arthritis     BILATERAL KNEESand right shoulder    Back pain     Colostomy RUQ 09/23/2020    Frequent headaches     10/28/2020 PATIENT STATES EVERY OTHER DAY.  Gout     History of atrial fibrillation     AFTER SURGERY ON 09/23/2020 WENT INTO A FIB. CONVERTED BACK TO NORMAL RHYTHM ON HIS OWN. CARDIOLOGIST DR Ruy Greer History of blood transfusion     NO REACTION    [place] (hard of hearing)     HTN     Charity Hickey CNP    Hyperkalemia     Hyperlipidemia     Incisional hernia     Kidney infection     Sepsis ([address]     post surgery. leak in bowel    Sleep apnea     USES CPAP MACHINE    T2DM     K. Raphael Bernabe CNP.  diet controlled    Tooth missing     RIGHT UPPER 2ND MOLAR    Under care of team 08/2021    Dr. Jonnathan Temples glasses     READING    Wellness examination 07/2021    Olinda Orf CNP [place] [facility]      Past Surgical History:   Procedure Laterality Date    ADENOIDECTOMY      TWICE AS A CHILD    ANKLE SURGERY Right 2010    RUPTURED ACHILES    CARPAL TUNNEL RELEASE Bilateral     COLOSTOMY      temporary    CYSTO/URETERO/PYELOSCOPY, CALCULUS TX Right 10/30/2020    HOLMIUM-STANDBY, CYSTOSCOPY, URETEROSCOPY, STENT EXCHANGE performed by Almita Corado MD at [place] [address] Right 09/01/2020    CYSTOSCOPY RIGHT URETERAL STENT INSERTION performed by Kirstin Harden MD at [address] [place] [address] CATH POWER PICC TRIPLE  09/03/2020    REMOVED AROUND 10/07/2020    KIDNEY SURGERY Left 09/02/2020    LAPAROSCOPIC XI ROBOTIC NEPHRECTOMY performed by Jose A Cotton MD at [address] [place] [address] ARTHROSCOPY Bilateral     LAPAROTOMY N/A 09/23/2020    LAPAROTOMY EXPLORATORY performed by Jacquelin Blair DO at [facility] N/A 09/02/2020    EXPLORATORY LAPAROTOMY, RESECTION OF PROXIMAL JEJUNUM AND DESCENDING COLON WITH MOBILIZATION OF SPLENIC FLEXURE AND PRIMARY ANASTAMOSISOF SMALL BOWEL AND COLON, PLACEMENT OF GASTROSTOMY TUBE performed by Jacquelin Blair DO at [facility] N/A 12/18/2020    EXPLORATORY LAPAROTOMY,COLOSTOMY REVERSAL, TAP BLOCK DONE IN OR BY DR Angel Mijares performed by Jacquelin Blair DO at [address] [place] [address] 3 TIMES  AS A CHILD    TYMPANOSTOMY TUBE PLACEMENT       Family History   Problem Relation Age of Onset    Stroke Maternal Grandmother     Stroke Maternal Grandfather     Other Mother         AORTIC BYPASS LED TO KIDNEY FAILURE    Kidney Disease Mother     Diabetes Father     Stroke Father      Previous records/history/ and notes were reviewed    Allergies: Allergies   Allergen Reactions    Ampicillin Swelling     Swelling of throat.  Pcn [Penicillins] Swelling     Throat swelling. Tolerated cefepime during 8/31/20 admission.     Sulfa Antibiotics      Other reaction(s): Unknown    Tape Munoz Shires Tape] Other (See Comments)     CAUSES REDNESS. PAPER TAPE OK. Home medications:  Prior to Admission medications    Medication Sig Start Date End Date Taking? Authorizing Provider   sodium zirconium cyclosilicate (LOKELMA) 10 g PACK oral suspension Take 10 g by mouth daily   Yes Historical Provider, MD   allopurinol (ZYLOPRIM) 100 MG tablet TAKE 1 TABLET BY MOUTH EVERY DAY 12/15/21  Yes TORREY Arthur CNP   metoprolol (LOPRESSOR) 100 MG tablet Take 100 mg by mouth 2 times daily  11/3/21  Yes Historical Provider, MD   zolpidem (AMBIEN CR) 12.5 MG extended release tablet Take 1 tablet by mouth nightly as needed for Sleep for up to 90 days.  10/11/21 1/9/22 Yes TORREY Arthur CNP   hydrOXYzine (ATARAX) 50 MG tablet TAKE 1 TABLET BY MOUTH EVERY DAY AT NIGHT  Patient taking differently: 50 mg  9/6/21  Yes TORREY Arthur CNP   dilTIAZem (CARDIZEM) 60 MG tablet Take 1 tablet by mouth every 12 hours 5/12/21  Yes TORREY Arthur CNP   ferrous sulfate (IRON 325) 325 (65 Fe) MG tablet Take 1 tablet by mouth daily (with breakfast) 12/28/20  Yes TORREY Arthur CNP   apixaban (ELIQUIS) 5 MG TABS tablet Take 1 tablet by mouth 2 times daily 12/2/20  Yes TORREY Arthur CNP   Melatonin 10 MG TABS Take 1 tablet by mouth nightly as needed   Yes Historical Provider, MD   Multiple Vitamins-Minerals (THERAPEUTIC MULTIVITAMIN-MINERALS) tablet Take 1 tablet by mouth daily 10/3/20  Yes Jez Siddiqi MD   vitamin C (ASCORBIC ACID) 500 MG tablet Take 500 mg by mouth daily   Yes Historical Provider, MD   aspirin 81 MG tablet Take 81 mg by mouth nightly Stopped 12/14/2020   Yes Historical Provider, MD   pravastatin (PRAVACHOL) 20 MG tablet Take 20 mg by mouth daily   Yes Historical Provider, MD   cyclobenzaprine (FLEXERIL) 10 MG tablet TAKE 1 TABLET BY MOUTH 2 TIMES DAILY AS NEEDED FOR MUSCLE SPASMS 12/3/21   TORREY Arthur CNP Abdomen:Soft, Non tender, Not distended, BS WNL,  No masses. No hepatomegalia   Ext:No clubbing. No cyanosis. No edema. Skin: No rashes. No jaundice. No stigmata of liver disease. Neuro:  A&O x Three, No focal neurological deficits    Imaging:       Hemotological labs: Anemia studies:  No results for input(s): LABIRON, TIBC, FERRITIN, OMTNNEVD07, FOLATE, OCCULTBLD in the last 72 hours. CBC:  Recent Labs     01/03/22  1500 01/04/22  1612 01/05/22  0655 01/05/22  0853   WBC 12.5* 9.5 8.9  --    HGB 11.6* 10.6* 10.7* 10.5*   MCV 92.2 92.0 94.3  --    RDW 14.2 14.4 14.5*  --     204 187  --        PT/INR:  Recent Labs     01/05/22  0655   PROTIME 11.2   INR 1.1       BMP:  Recent Labs     01/04/22  1612 01/05/22  0134 01/05/22  0853   * 132* 133*   K 6.4* 5.5* 4.8    101 105   CO2 14* 20 15*   BUN 54* 45* 42*   CREATININE 2.32* 2.13* 2.07*   GLUCOSE 217* 366* 169*   CALCIUM 8.9 8.3* 8.9       Liver work up:  Hepatitis Functional Panel:  Recent Labs     01/05/22  0853   ALKPHOS 74   ALT 36   AST 28   PROT 7.0   BILITOT 0.34   LABALBU 3.8       Amylase/Lipase/Ammonia:  No results for input(s): AMYLASE, LIPASE, AMMONIA in the last 72 hours. Acute Hepatitis Panel:  Lab Results   Component Value Date    HEPCAB NONREACTIVE 11/10/2020            Principal Problem:    Gastrointestinal hemorrhage with melena  Active Problems:    Type 2 diabetes mellitus without complication ([address]    Essential hypertension    Morbid obesity due to excess calories ([address]    Acute kidney injury superimposed on CKD ([address]    Acute blood loss anemia    Hyperkalemia  Resolved Problems:    * No resolved hospital problems. *       GI Impression and recommendations and plan:    59-year-old male presented with hematochezia and diarrhea. Patient reports large doses of NSAIDs.   Normocytic anemia with hemoglobin 10.5 g/dL  -Most likely patient has NSAID induced peptic ulcer disease versus esophagitis versus gastritis    1. We will plan for endoscopy today   2. Keep patient n.p.o. monitor  3. Monitor for active GI bleeding  4. Daily CBC BMP  5. We will start patient on Protonix 40 mg p.o. twice daily  6. Patient will need outpatient screening colonoscopy complete plan of care to follow  7. Complete plan of care to follow endoscopy    This plan was formulated in collaboration with Dr. Yareli Piedra MD      Electronically signed by:  Tiffani Cancino  Gastroenterology  [phone]  1/5/2022    12:59 PM     This note was created with the assistance of a speech-recognition program.  Although the intention is to generate a document that actually reflects the content of the visit, no guarantees can be provided that every mistake has been identified and corrected by editing.

## 2022-01-05 NOTE — ED NOTES
Report given to [facility] on 3B.  All questions answered      Chidi Bautista RN  01/05/22 0615

## 2022-01-06 VITALS
WEIGHT: 280.43 LBS | OXYGEN SATURATION: 96 % | HEART RATE: 106 BPM | DIASTOLIC BLOOD PRESSURE: 96 MMHG | SYSTOLIC BLOOD PRESSURE: 127 MMHG | BODY MASS INDEX: 39.26 KG/M2 | TEMPERATURE: 97.7 F | HEIGHT: 71 IN | RESPIRATION RATE: 20 BRPM

## 2022-01-06 LAB
HCT VFR BLD CALC: 33.7 % (ref 40.7–50.3)
HEMOGLOBIN: 11.1 G/DL (ref 13–17)
SURGICAL PATHOLOGY REPORT: NORMAL

## 2022-01-06 PROCEDURE — 6370000000 HC RX 637 (ALT 250 FOR IP): Performed by: INTERNAL MEDICINE

## 2022-01-06 PROCEDURE — 36415 COLL VENOUS BLD VENIPUNCTURE: CPT

## 2022-01-06 PROCEDURE — 85014 HEMATOCRIT: CPT

## 2022-01-06 PROCEDURE — 6370000000 HC RX 637 (ALT 250 FOR IP): Performed by: NURSE PRACTITIONER

## 2022-01-06 PROCEDURE — 85018 HEMOGLOBIN: CPT

## 2022-01-06 PROCEDURE — 99239 HOSP IP/OBS DSCHRG MGMT >30: CPT | Performed by: INTERNAL MEDICINE

## 2022-01-06 RX ORDER — CYCLOBENZAPRINE HCL 10 MG
10 TABLET ORAL 2 TIMES DAILY PRN
Status: DISCONTINUED | OUTPATIENT
Start: 2022-01-06 | End: 2022-01-06 | Stop reason: HOSPADM

## 2022-01-06 RX ORDER — PRAVASTATIN SODIUM 20 MG
20 TABLET ORAL DAILY
Status: DISCONTINUED | OUTPATIENT
Start: 2022-01-06 | End: 2022-01-06 | Stop reason: HOSPADM

## 2022-01-06 RX ORDER — PANTOPRAZOLE SODIUM 40 MG/1
40 TABLET, DELAYED RELEASE ORAL
Qty: 60 TABLET | Refills: 1 | Status: SHIPPED | OUTPATIENT
Start: 2022-01-06

## 2022-01-06 RX ORDER — ASCORBIC ACID 500 MG
500 TABLET ORAL DAILY
Status: DISCONTINUED | OUTPATIENT
Start: 2022-01-06 | End: 2022-01-06 | Stop reason: HOSPADM

## 2022-01-06 RX ORDER — ALLOPURINOL 100 MG/1
100 TABLET ORAL DAILY
Status: DISCONTINUED | OUTPATIENT
Start: 2022-01-06 | End: 2022-01-06 | Stop reason: HOSPADM

## 2022-01-06 RX ADMIN — SODIUM ZIRCONIUM CYCLOSILICATE 10 G: 10 POWDER, FOR SUSPENSION ORAL at 08:58

## 2022-01-06 RX ADMIN — OXYCODONE HYDROCHLORIDE AND ACETAMINOPHEN 500 MG: 500 TABLET ORAL at 08:58

## 2022-01-06 RX ADMIN — FERROUS SULFATE TAB EC 325 MG (65 MG FE EQUIVALENT) 325 MG: 325 (65 FE) TABLET DELAYED RESPONSE at 08:58

## 2022-01-06 RX ADMIN — PRAVASTATIN SODIUM 20 MG: 20 TABLET ORAL at 08:58

## 2022-01-06 RX ADMIN — PANTOPRAZOLE SODIUM 40 MG: 40 TABLET, DELAYED RELEASE ORAL at 06:34

## 2022-01-06 RX ADMIN — ALLOPURINOL 100 MG: 100 TABLET ORAL at 08:58

## 2022-01-06 RX ADMIN — DILTIAZEM HYDROCHLORIDE 60 MG: 60 TABLET, FILM COATED ORAL at 09:03

## 2022-01-06 NOTE — PLAN OF CARE
Problem: Falls - Risk of:  Goal: Will remain free from falls  Description: Will remain free from falls  Outcome: Ongoing  Goal: Absence of physical injury  Description: Absence of physical injury  Outcome: Ongoing     Problem: Sensory:  Goal: Ability to identify factors that increase the pain will improve  Description: Ability to identify factors that increase the pain will improve  Outcome: Ongoing  Goal: Ability to demonstrate ways to enhance comfort will improve  Description: Ability to demonstrate ways to enhance comfort will improve  Outcome: Ongoing  Goal: General experience of comfort will improve  Description: General experience of comfort will improve  Outcome: Ongoing     Problem:  Activity:  Goal: Risk for activity intolerance will decrease  Description: Risk for activity intolerance will decrease  Outcome: Ongoing     Problem: Coping:  Goal: Ability to adjust to condition or change in health will improve  Description: Ability to adjust to condition or change in health will improve  Outcome: Ongoing     Problem: Fluid Volume:  Goal: Ability to maintain a balanced intake and output will improve  Description: Ability to maintain a balanced intake and output will improve  Outcome: Ongoing     Problem: Health Behavior:  Goal: Ability to identify and utilize available resources and services will improve  Description: Ability to identify and utilize available resources and services will improve  Outcome: Ongoing  Goal: Ability to manage health-related needs will improve  Description: Ability to manage health-related needs will improve  Outcome: Ongoing     Problem: Metabolic:  Goal: Ability to maintain appropriate glucose levels will improve  Description: Ability to maintain appropriate glucose levels will improve  Outcome: Ongoing     Problem: Nutritional:  Goal: Maintenance of adequate nutrition will improve  Description: Maintenance of adequate nutrition will improve  Outcome: Ongoing  Goal: Progress toward

## 2022-01-06 NOTE — PROGRESS NOTES
[facility]  Office: [address], DO, Love Favre, DO, Balta Santosromel, DO, Alona Eye Blood, DO, Neil Norris MD, Gali Mejia MD, Dylan Cleveland MD, Bridget Duckworth MD, Jennifer Mckeon MD, Bev Sage MD, Pilar Vera MD, Bianca Dong, DO, Telly Hernández, DO, Murlean Gowers, MD,  Sunitha Ross DO, Stephon Hicks MD, Manasa Lozano MD, Bernardo Mcclain MD, Pa Lambert MD, Stas Rodas MD, Erika Perry MD, Ruben Gil MD, Carlos [facility], [facility] Yvonne, CNP, Demetrius Gonzalez CNP, Sheri Johnson, CNS, Garrick Pedersen, CNP, Destiny Contreras, CNP, Antonio Badillo CNP, Katherine Hastings, CNP, Taylor Barber CNP, Alfonso Gomez PA-C, Susana Green DNP, Jason Alfaro DNP, Liberty Henley, CNP, Ryley Mcgee, CNP, Dusty Banerjee CNP, Trent Alcala CNP, Ty Goodman CNP, Ronny Rodriguez, [address] [place] [address]    Progress Note    1/6/2022    8:17 AM    Name:   Armin Mccullough  MRN:     [id number]     Acct:      [de-identified]   Room:   [address] [place] [address] Day:  2  Admit Date:  1/4/2022  3:21 PM    PCP:   TORREY Babcock CNP  Code Status:  Full Code    Subjective:     C/C:   Chief Complaint   Patient presents with    Rectal Bleeding    Discuss Labs     Interval History Status: improved. Feels good  No further bleeding  No cp/sob/n/v    Brief History:     Per my partner:  \"Prateek Thayer is a 59 y.o. Non- / non  male who presents with Rectal Bleeding and Discuss Labs   and is admitted to the hospital for the management of Gastrointestinal hemorrhage with melena.     Patient has medical history significant for hypertension, hyperlipidemia, type 2 diabetes, history of colon and small bowel resection; presents with melena x1 day. Patient states that 5 days prior to admission he had noted some bloody stools that were cranberry color and appearance.   Patient states he had several bouts of watery stools for several days. 1 day prior to admission, patient noted having a formed stool that appeared black in color. Patient states that he could see what look like blood coming off of the black stools while in the toilet. Patient reports that he became concerned and went to his primary doctor and blood work was done. Patient states that after blood work came back, he was instructed by his primary doctor to go into the hospital.  Patient presented to our ER and was found to have potassium of 6.4. Patient states he has been having issues with his potassium levels. Patient notes that he was supposed to have a recent hernia repair, but it was canceled secondary to his potassium being around 5.5. Patient states that anesthesiologist felt that patient would need to get his potassium levels down first before surgery. Patient notes that he went to see a nephrologist and was placed on sodium bicarb along with Nikki Lambert. Patient states he was placed on a limited amount these medications and completed them recently. Patient states he was supposed to follow-up with nephrology in order to assess further treatment. Patient notes that he has had some shortness of breath when moving items which is not typical for him. Patient is also noted a decrease in his appetite lately. No fever, no chills, no URI or UTI type symptoms, no chest pain, no abdominal pain, no headaches.     Patient reports that 1 year ago, he underwent exploratory laparotomy. Patient states that it was thought that patient had kidney cancer and on exploration, patient was found to have ischemia of a portion of his colon and small intestines. Patient states that his kidney was removed along with the ischemic part of his bowels. Patient notes that he had a colostomy placed and later had it reversed. Patient reports he ended up with a ventral hernia after surgical intervention.  \"    Review of Systems:     Constitutional:  negative for chills, fevers, sweats  Respiratory:  negative for cough, dyspnea on exertion, shortness of breath, wheezing  Cardiovascular:  negative for chest pain, chest pressure/discomfort, lower extremity edema, palpitations  Gastrointestinal:  negative for abdominal pain, constipation, diarrhea, nausea, vomiting  Neurological:  negative for dizziness, headache    Medications: Allergies: Allergies   Allergen Reactions    Ampicillin Swelling     Swelling of throat.  Pcn [Penicillins] Swelling     Throat swelling. Tolerated cefepime during 8/31/20 admission.  Sulfa Antibiotics      Other reaction(s): Unknown    Tape [Adhesive Tape] Other (See Comments)     CAUSES REDNESS. PAPER TAPE OK. Current Meds:   Scheduled Meds:    dilTIAZem  60 mg Oral Q12H    ferrous sulfate  325 mg Oral Daily with breakfast    sodium chloride flush  5-40 mL IntraVENous 2 times per day    sodium zirconium cyclosilicate  10 g Oral TID    pantoprazole  40 mg Oral BID AC     Continuous Infusions:    sodium chloride      sodium bicarbonate infusion 50 mL/hr at 01/05/22 1219     PRN Meds: hydrOXYzine, sodium chloride flush, sodium chloride, ondansetron **OR** ondansetron, acetaminophen **OR** acetaminophen, zolpidem    Data:     Past Medical History:   has a past medical history of Arthritis, Back pain, Colostomy RUQ, Frequent headaches, Gout, History of atrial fibrillation, History of blood transfusion, [place] (hard of hearing), HTN, Hyperkalemia, Hyperlipidemia, Incisional hernia, Kidney infection, Sepsis ([address] Sleep apnea, T2DM, Tooth missing, Under care of team, Wears glasses, and Wellness examination. Social History:   reports that he has never smoked. He quit smokeless tobacco use about 42 years ago. His smokeless tobacco use included chew. He reports current alcohol use of about 2.0 standard drinks of alcohol per week. He reports that he does not use drugs.      Family History:   Family History   Problem Relation Age of Onset    Stroke Maternal Grandmother     Stroke Maternal Grandfather     Other Mother         AORTIC BYPASS LED TO KIDNEY FAILURE    Kidney Disease Mother     Diabetes Father     Stroke Father        Vitals:  BP (!) 127/96   Pulse 106   Temp 97.7 °F (36.5 °C) (Oral)   Resp 20   Ht 5' 11\" (1.803 m)   Wt 280 lb 6.8 oz (127.2 kg)   SpO2 96%   BMI 39.11 kg/m²   Temp (24hrs), Av.1 °F (36.7 °C), Min:97.7 °F (36.5 °C), Max:98.8 °F (37.1 °C)    Recent Labs     22  0859 22  1650   POCGLU 170* 192*       I/O (24Hr): Intake/Output Summary (Last 24 hours) at 2022 0817  Last data filed at 2022 1725  Gross per 24 hour   Intake 1731 ml   Output 2100 ml   Net -369 ml       Labs:  Hematology:  Recent Labs     22  1500 22  1500 22  1612 22  1612 22  0655 22  0853 22  1627 22  2005 22  0610   WBC 12.5*  --  9.5  --  8.9  --   --   --   --    RBC 3.85*  --  3.52*  --  3.52*  --   --   --   --    HGB 11.6*   < > 10.6*   < > 10.7*   < > 11.1* 10.6* 11.1*   HCT 35.5*   < > 32.4*   < > 33.2*   < > 33.4* 32.1* 33.7*   MCV 92.2  --  92.0  --  94.3  --   --   --   --    MCH 30.1  --  30.1  --  30.4  --   --   --   --    MCHC 32.7  --  32.7  --  32.2  --   --   --   --    RDW 14.2  --  14.4  --  14.5*  --   --   --   --      --  204  --  187  --   --   --   --    MPV 11.7  --  10.5  --  10.2  --   --   --   --    INR  --   --   --   --  1.1  --   --   --   --    DDIMER 0.40  --   --   --   --   --   --   --   --     < > = values in this interval not displayed.      Chemistry:  Recent Labs     22  1500 22  1500 22  1612 22  0134 22  0853      < > 127* 132* 133*   K 6.0*   < > 6.4* 5.5* 4.8      < > 102 101 105   CO2 15*   < > 14* 20 15*   GLUCOSE 216*   < > 217* 366* 169*   BUN 47*   < > 54* 45* 42*   CREATININE 2.31*   < > 2.32* 2.13* 2.07*   MG  --   --   --   --  1.9   ANIONGAP 15   < > 11 11 13   LABGLOM 29*   < > 29* 32* 33*   GFRAA 35*   < > 35* 38* 40*   CALCIUM 9.9   < > 8.9 8.3* 8.9   PROBNP  --   --  88  --   --    TROPHS 58*  --  59*  --   --     < > = values in this interval not displayed. Recent Labs     01/04/22  1612 01/05/22  0853 01/05/22  0859 01/05/22  1650   PROT 7.2 7.0  --   --    LABALBU 4.2 3.8  --   --    AST 31 28  --   --    ALT 38 36  --   --    ALKPHOS 83 74  --   --    BILITOT 0.32 0.34  --   --    POCGLU  --   --  170* 192*     ABG:  Lab Results   Component Value Date    POCPH 7.427 09/03/2020    PHART 7.363 09/23/2020    POCPCO2 37.1 09/03/2020    ICT9HVA 34.3 09/23/2020    POCPO2 103.4 09/03/2020    PO2ART 187.0 09/23/2020    POCHCO3 24.4 09/03/2020    AUH2QRZ 19.0 09/23/2020    NBEA 5.2 09/23/2020    PBEA NOT REPORTED 09/23/2020    SHL1LCG 26 09/03/2020    XHRR8QMU 98 09/03/2020    V1ICDVDO 99.3 09/23/2020    FIO2 60% 09/23/2020     Lab Results   Component Value Date/Time    SPECIAL NOT REPORTED 01/04/2022 08:19 PM     Lab Results   Component Value Date/Time    CULTURE NO GROWTH 01/04/2022 08:19 PM       Radiology:  XR CHEST (2 VW)    Result Date: 1/4/2022  No acute airspace disease identified.      US RENAL COMPLETE    Result Date: 1/4/2022  Unremarkable appearing right kidney and bladder RECOMMENDATIONS: Unavailable       Physical Examination:        General appearance:  alert, cooperative and no distress  Mental Status:  oriented to person, place and time and normal affect  Lungs:  clear to auscultation bilaterally, normal effort  Heart:  regular rate and rhythm, no murmur  Abdomen:  soft, nontender, nondistended, normal bowel sounds, no masses, hepatomegaly, splenomegaly; obese  Extremities:  no edema, redness, tenderness in the calves  Skin:  no gross lesions, rashes, induration    Assessment:        Hospital Problems           Last Modified POA    * (Principal) Gastrointestinal hemorrhage with melena 1/5/2022 Yes    Type 2 diabetes mellitus without complication ([facility] 7/9/1994 Yes Essential hypertension 1/5/2022 Yes    Morbid obesity due to excess calories ([address] 1/5/2022 Yes    Acute kidney injury superimposed on CKD ([facility] 1/5/2022 Yes    Acute blood loss anemia 1/5/2022 Yes    Hyperkalemia 1/5/2022 Yes          Plan:        1. reduce lokelma to home dose  2.  Dc home    Diane Faith Blood, DO  1/6/2022  8:17 AM

## 2022-01-06 NOTE — PROGRESS NOTES
CLINICAL PHARMACY NOTE: MEDS TO BEDS    Total # of Prescriptions Filled: 1   The following medications were delivered to the patient:  · Pantoprazole 40mg tablet    Additional Documentation: medications delivered to the pt in room 315 on 01.06.22 at 11:54, 1 visitor in the room at the time of delivery.  Co pay $6, paid cash

## 2022-01-06 NOTE — DISCHARGE SUMMARY
[facility]  Office: [address], , Terrell Swift, DO, Isabell Rahman, DO, Fahad Clayton Blood, DO, Sánchez Cheatham MD, Simone Villalobos MD, Moustapha Jamison MD, Elsi Dumont MD, Edson Oconnor MD, Jhoana Olivo MD, Saida Cabrera MD, Ceil Buenrostro, DO, Florentino Norris, DO, Jorge Fonseca MD,  Deepa Cruz DO, Roslyn Garcia MD, Toshia Cm MD, Shahnaz Loaiza MD, Keith Dalton MD, Frederic Dumont MD, Enzo Villanueva MD, Lalo Olivo MD, Shabnam Martinez [employer], [facility], CNP, Rhianna Baires, CNP, Phu Ortega, CNS, Luis Eduardo Razo, [employer], Beryle Fix, CNP, Barak Sy, CNP, David Griffin, CNP, Malachi Greco, CNP, Raina Jacinto PA-C, [place], [facility], Gavin Rm, [facility], Sylvia Gottlieb, CNP, Lm Mcintosh, CNP, Mariaa Hillman, CNP, Wicho Jones, CNP, Benton Mcgraw, CNP, Mekhi Rice, [facility]    Discharge Summary     Patient ID: Ashley Hutson II  :  1960   MRN: [id number]     ACCOUNT:  [de-identified]   Patient's PCP: TORREY Aleman CNP  Admit Date: 2022   Discharge Date: 2022     Length of Stay: 2  Code Status:  Full Code  Admitting Physician: Elsi Dumont MD  Discharge Physician: Berenice Archibald DO     Active Discharge Diagnoses:     Hospital Problem Lists:  Principal Problem:    Gastrointestinal hemorrhage with melena  Active Problems:    Type 2 diabetes mellitus without complication ([facility]    Essential hypertension    Morbid obesity due to excess calories ([facility]    Acute kidney injury superimposed on CKD ([facility]    Acute blood loss anemia    Hyperkalemia  Resolved Problems:    * No resolved hospital problems.  *      Admission Condition:  fair     Discharged Condition: stable    Hospital Stay:     Hospital Course:  Juan Short is a 59 y.o. male who was admitted for the management of   Gastrointestinal hemorrhage with melena , presented to ER with Rectal Bleeding and Discuss Labs    Admitted with melena and blood loss anemia. Had serial h/h done and egd which showed :  · LA grade B erosive esophagitis at the GE junction   · There are diffuse gastritis  · Multiple clean-based ulcerations in the stomach antrum and duodenal bulb  Plan for bid protonix. Antiplatelets and anticoagulation approved to be resumed by GI      Also had high k upon arrival and was placed on tid lokelma and seen by renal, then reduced to home dose of daily      Significant therapeutic interventions: see above    Significant Diagnostic Studies:   Labs / Micro:  CBC:   Lab Results   Component Value Date    WBC 8.9 01/05/2022    RBC 3.52 01/05/2022    HGB 11.1 01/06/2022    HCT 33.7 01/06/2022    MCV 94.3 01/05/2022    MCH 30.4 01/05/2022    MCHC 32.2 01/05/2022    RDW 14.5 01/05/2022     01/05/2022     CMP:    Lab Results   Component Value Date    GLUCOSE 169 01/05/2022     01/05/2022    K 4.8 01/05/2022     01/05/2022    CO2 15 01/05/2022    BUN 42 01/05/2022    CREATININE 2.07 01/05/2022    ANIONGAP 13 01/05/2022    ALKPHOS 74 01/05/2022    ALT 36 01/05/2022    AST 28 01/05/2022    BILITOT 0.34 01/05/2022    LABALBU 3.8 01/05/2022    ALBUMIN 1.2 01/05/2022    LABGLOM 33 01/05/2022    GFRAA 40 01/05/2022    GFR      01/05/2022    GFR NOT REPORTED 01/05/2022    PROT 7.0 01/05/2022    CALCIUM 8.9 01/05/2022        Radiology:  XR CHEST (2 VW)    Result Date: 1/4/2022  No acute airspace disease identified. US RENAL COMPLETE    Result Date: 1/4/2022  Unremarkable appearing right kidney and bladder RECOMMENDATIONS: Unavailable       Consultations:    Consults:     Final Specialist Recommendations/Findings:   IP CONSULT TO NEPHROLOGY  IP CONSULT TO HOSPITALIST  IP CONSULT TO NEPHROLOGY  IP CONSULT TO GI      The patient was seen and examined on day of discharge and this discharge summary is in conjunction with any daily progress note from day of discharge.     Discharge plan:     Disposition: Home    Physician Follow Up:     Ced Correa MD  [address] [place] [address] [address] [place] [address]  [phone]    Schedule an appointment as soon as possible for a visit  Please call to make a follow upa ppt in 3-4 weeks to discuss biopsy results    Tesha Villarreal, APRNIKITA - CNP  [address] [facility] [place] [address] [phone]             Requiring Further Evaluation/Follow Up POST HOSPITALIZATION/Incidental Findings: follow up with GI    Diet: regular diet    Activity: As tolerated    Instructions to Patient: take medications as prescribed      Discharge Medications:      Medication List      START taking these medications    pantoprazole 40 MG tablet  Commonly known as: PROTONIX  Take 1 tablet by mouth 2 times daily (before meals)        CHANGE how you take these medications    hydrOXYzine 50 MG tablet  Commonly known as: ATARAX  TAKE 1 TABLET BY MOUTH EVERY DAY AT NIGHT  What changed: See the new instructions.         CONTINUE taking these medications    acetaminophen 500 MG tablet  Commonly known as: Tylenol  Take 2 tablets by mouth every 6 hours as needed for Pain     allopurinol 100 MG tablet  Commonly known as: ZYLOPRIM  TAKE 1 TABLET BY MOUTH EVERY DAY     apixaban 5 MG Tabs tablet  Commonly known as: Eliquis  Take 1 tablet by mouth 2 times daily     aspirin 81 MG tablet     CareTouch CPAP & BIPAP Hose Misc     CPAP Machine Misc     cyclobenzaprine 10 MG tablet  Commonly known as: FLEXERIL  TAKE 1 TABLET BY MOUTH 2 TIMES DAILY AS NEEDED FOR MUSCLE SPASMS     dilTIAZem 60 MG tablet  Commonly known as: CARDIZEM  Take 1 tablet by mouth every 12 hours     ferrous sulfate 325 (65 Fe) MG tablet  Commonly known as: IRON 325  Take 1 tablet by mouth daily (with breakfast)     Lokelma 10 g Pack oral suspension  Generic drug: sodium zirconium cyclosilicate     Melatonin 10 MG Tabs     metoprolol 100 MG tablet  Commonly known as: LOPRESSOR     pravastatin 20 MG tablet  Commonly known as: PRAVACHOL     therapeutic

## 2022-01-07 ENCOUNTER — PATIENT MESSAGE (OUTPATIENT)
Dept: FAMILY MEDICINE CLINIC | Age: 62
End: 2022-01-07

## 2022-01-07 NOTE — TELEPHONE ENCOUNTER
From: Estelle Jauregui II  To: Mahesh Pale  Sent: 1/7/2022 6:32 PM EST  Subject: Prescription Question    Windham Dural I need my zolpiedium 10.5 mg refilled I pay out of pocket so to get 90 they need the pass from you thanks arn call Monday for appt.

## 2022-01-07 NOTE — ED PROVIDER NOTES
[facility]//MED SURG  Emergency Department Encounter  EmergencyMedicine Resident     Pt Name:Prateek Smallwood  MRN: [id number]  [place] 1960  Date of evaluation: 1/7/22  PCP:  TORREY Navarro CNP    CHIEF COMPLAINT       Chief Complaint   Patient presents with    Rectal Bleeding    Discuss Labs       HISTORY OF PRESENT ILLNESS  (Location/Symptom, Timing/Onset, Context/Setting, Quality, Duration, Modifying Factors, Severity.)      Luc Huertas is a 59 y.o. male who presents with chief complaint laboratory abnormality. Patient had outpatient testing with PCP, potassium 6. Patient states he is not symptomatic currently no chest pain shortness of breath. Patient does report large bloody stool starting about 3 days ago has progressively improved, formed stool today however it is black. Denying chest pain shortness of breath nausea vomiting fevers abdominal pain. Patient has persistent and chronic hyperkalemia, takes Lokelma. Has intermittent follow-up with nephrology    PAST MEDICAL / SURGICAL / SOCIAL / FAMILY HISTORY      has a past medical history of Arthritis, Back pain, Colostomy RUQ, Frequent headaches, Gout, History of atrial fibrillation, History of blood transfusion, [place] (hard of hearing), HTN, Hyperkalemia, Hyperlipidemia, Incisional hernia, Kidney infection, Sepsis ([facility] [address] Sleep apnea, T2DM, Tooth missing, Under care of team, Wears glasses, and Wellness examination. has a past surgical history that includes Ankle surgery (Right, 2010); Carpal tunnel release (Bilateral); Cystoscopy (Right, 09/01/2020);  cath power picc triple (09/03/2020); Kidney surgery (Left, 09/02/2020); Small intestine surgery (N/A, 09/02/2020); laparotomy (N/A, 09/23/2020); Tonsillectomy; Knee arthroscopy (Bilateral); Adenoidectomy; cysto/uretero/pyeloscopy, calculus tx (Right, 10/30/2020); colostomy; Small intestine surgery (N/A, 12/18/2020);  Tympanostomy tube placement; and Upper gastrointestinal endoscopy (N/A, 1/5/2022). Social History     Socioeconomic History    Marital status:      Spouse name: Not on file    Number of children: Not on file    Years of education: Not on file    Highest education level: Not on file   Occupational History    Not on file   Tobacco Use    Smoking status: Never Smoker    Smokeless tobacco: Former User     Types: Chew   Vaping Use    Vaping Use: Never used   Substance and Sexual Activity    Alcohol use: Yes     Alcohol/week: 2.0 standard drinks     Types: 2 Cans of beer per week    Drug use: No    Sexual activity: Not Currently   Other Topics Concern    Not on file   Social History Narrative    Not on file     Social Determinants of Health     Financial Resource Strain:     Difficulty of Paying Living Expenses: Not on file   Food Insecurity:     Worried About Running Out of Food in the Last Year: Not on file    Oseas of Food in the Last Year: Not on file   Transportation Needs:     Lack of Transportation (Medical): Not on file    Lack of Transportation (Non-Medical):  Not on file   Physical Activity:     Days of Exercise per Week: Not on file    Minutes of Exercise per Session: Not on file   Stress:     Feeling of Stress : Not on file   Social Connections:     Frequency of Communication with Friends and Family: Not on file    Frequency of Social Gatherings with Friends and Family: Not on file    Attends Yazidi Services: Not on file    Active Member of Clubs or Organizations: Not on file    Attends Club or Organization Meetings: Not on file    Marital Status: Not on file   Intimate Partner Violence:     Fear of Current or Ex-Partner: Not on file    Emotionally Abused: Not on file    Physically Abused: Not on file    Sexually Abused: Not on file   Housing Stability:     Unable to Pay for Housing in the Last Year: Not on file    Number of [place] in the Last Year: Not on file    Unstable Housing in the Last Year: Not on file       Family History   Problem Relation Age of Onset    Stroke Maternal Grandmother     Stroke Maternal Grandfather     Other Mother         AORTIC BYPASS LED TO KIDNEY FAILURE    Kidney Disease Mother     Diabetes Father     Stroke Father        Allergies:  Ampicillin, Pcn [penicillins], Sulfa antibiotics, and Tape [adhesive tape]    Home Medications:  Prior to Admission medications    Medication Sig Start Date End Date Taking? Authorizing Provider   pantoprazole (PROTONIX) 40 MG tablet Take 1 tablet by mouth 2 times daily (before meals) 1/6/22  Yes Valdemar DELEON Blood, DO   sodium zirconium cyclosilicate (LOKELMA) 10 g PACK oral suspension Take 10 g by mouth daily   Yes Historical Provider, MD   allopurinol (ZYLOPRIM) 100 MG tablet TAKE 1 TABLET BY MOUTH EVERY DAY 12/15/21  Yes TORREY Cobb CNP   metoprolol (LOPRESSOR) 100 MG tablet Take 100 mg by mouth 2 times daily  11/3/21  Yes Historical Provider, MD   zolpidem (AMBIEN CR) 12.5 MG extended release tablet Take 1 tablet by mouth nightly as needed for Sleep for up to 90 days.  10/11/21 1/9/22 Yes TORREY Cobb CNP   hydrOXYzine (ATARAX) 50 MG tablet TAKE 1 TABLET BY MOUTH EVERY DAY AT NIGHT  Patient taking differently: 50 mg  9/6/21  Yes TORREY Cobb CNP   dilTIAZem (CARDIZEM) 60 MG tablet Take 1 tablet by mouth every 12 hours 5/12/21  Yes TORREY Cobb CNP   ferrous sulfate (IRON 325) 325 (65 Fe) MG tablet Take 1 tablet by mouth daily (with breakfast) 12/28/20  Yes TORREY Cobb CNP   apixaban (ELIQUIS) 5 MG TABS tablet Take 1 tablet by mouth 2 times daily 12/2/20  Yes TORREY Cobb CNP   Melatonin 10 MG TABS Take 1 tablet by mouth nightly as needed   Yes Historical Provider, MD   Multiple Vitamins-Minerals (THERAPEUTIC MULTIVITAMIN-MINERALS) tablet Take 1 tablet by mouth daily 10/3/20  Yes Nilda Cintron MD   vitamin C (ASCORBIC ACID) 500 MG tablet Take 500 mg by mouth daily   Yes Historical Provider, MD   aspirin 81 MG tablet Take 81 mg by mouth nightly Stopped 12/14/2020   Yes Historical Provider, MD   pravastatin (PRAVACHOL) 20 MG tablet Take 20 mg by mouth daily   Yes Historical Provider, MD   cyclobenzaprine (FLEXERIL) 10 MG tablet TAKE 1 TABLET BY MOUTH 2 TIMES DAILY AS NEEDED FOR MUSCLE SPASMS 12/3/21   TORREY Newell CNP   blood glucose test strips (TRUE METRIX BLOOD GLUCOSE TEST) strip PLEASE SEE ATTACHED FOR DETAILED DIRECTIONS 1/25/21   TORREY Newell CNP   CPAP Machine MISC use as directed    Historical Provider, MD   Respiratory Therapy Supplies (CARETOUCH CPAP & BIPAP HOSE) MISC Mask and Tubing 1/28/20   Historical Provider, MD   acetaminophen (TYLENOL) 500 MG tablet Take 2 tablets by mouth every 6 hours as needed for Pain 8/11/20 12/21/21  Mk Yousif MD       REVIEW OF SYSTEMS    (2-9 systems for level 4, 10 or more for level 5)      Review of Systems   Constitutional: Negative for fever. HENT: Negative for congestion. Eyes: Negative for photophobia. Respiratory: Negative for shortness of breath. Cardiovascular: Negative for chest pain. Gastrointestinal: Positive bloody stools  Endocrine: Negative for polyuria. Genitourinary: Negative for dysuria. Musculoskeletal: Negative for arthralgias. Skin: Negative for color change. Allergic/Immunologic: Negative for immunocompromised state. Neurological: Negative for dizziness. Hematological: Does not bruise/bleed easily. Psychiatric/Behavioral: Negative for agitation. PHYSICAL EXAM   (up to 7 for level 4, 8 or more for level 5)      INITIAL VITALS:   BP (!) 127/96   Pulse 106   Temp 97.7 °F (36.5 °C) (Oral)   Resp 20   Ht 5' 11\" (1.803 m)   Wt 280 lb 6.8 oz (127.2 kg)   SpO2 96%   BMI 39.11 kg/m²     Physical Exam  Constitutional:       General: Not in acute distress. Appearance: Normal appearance. Normal weight. Not toxic-appearing. HENT:      Head: Normocephalic and atraumatic. Nose: Nose normal.      Mouth/Throat: Mucous membranes are moist.  Uvula midline no oropharyngeal edema. Pharynx: Oropharynx is clear. Eyes:      Extraocular Movements: Extraocular movements intact. Conjunctiva/sclera: Conjunctivae normal.      Pupils: Pupils are equal, round, and reactive to light. Neck:      Musculoskeletal: Normal range of motion and neck supple. No neck rigidity. Cardiovascular:      Rate and Rhythm: Normal rate and regular rhythm. Pulses: Normal pulses. Heart sounds: Normal heart sounds. No murmur. Pulmonary:      Effort: Pulmonary effort is normal.      Breath sounds: Normal breath sounds. No wheezing. Abdominal:      General: Abdomen is flat. Bowel sounds are normal.      Tenderness: There is no abdominal tenderness. Musculoskeletal:     Normal range of motion. General: No swelling or tenderness. No LE edema    Skin:     General: Skin is warm. Capillary Refill: Capillary refill takes less than 2 seconds. Coloration: Skin is not jaundiced. Neurological:      General: No focal deficit present. Mental Status: Alert and oriented to person, place, and time. Mental status is at baseline. Motor: No weakness.        DIFFERENTIAL  DIAGNOSIS     PLAN (LABS / IMAGING / EKG):  Orders Placed This Encounter   Procedures    Culture, Urine    XR CHEST (2 VW)    US RENAL COMPLETE    CBC Auto Differential    Comprehensive Metabolic Panel w/ Reflex to MG    Troponin    Brain Natriuretic Peptide    SODIUM, URINE, RANDOM    CHLORIDE, URINE, RANDOM    Urinalysis with Microscopic    BASIC METABOLIC PANEL    CBC    Protime-INR    SPECIMEN REJECTION    Comprehensive Metabolic Panel w/ Reflex to MG    Magnesium    PREVIOUS SPECIMEN    Surgical Pathology    SURGICAL PATHOLOGY REPORT    Inpatient consult to Nephrology    Inpatient consult to Hospitalist    Consult to Nephrology    Inpatient consult to GI    POC Glucose Fingerstick    POC Glucose Fingerstick    EKG 12 Lead    Insert peripheral IV    PATIENT STATUS (FROM ED OR OR/PROCEDURAL) Inpatient    Transfer Patient    Discharge patient       MEDICATIONS ORDERED:  Orders Placed This Encounter   Medications    DISCONTD: sodium bicarbonate 150 mEq in dextrose 5 % 1,000 mL infusion    insulin regular (HUMULIN R;NOVOLIN R) injection 10 Units    dextrose 50 % IV solution    fludrocortisone (FLORINEF) tablet 0.1 mg    DISCONTD: sodium zirconium cyclosilicate (LOKELMA) oral suspension 10 g    DISCONTD: dilTIAZem (CARDIZEM) tablet 60 mg    DISCONTD: ferrous sulfate (FE TABS 325) EC tablet 325 mg    DISCONTD: hydrOXYzine (ATARAX) tablet 50 mg    DISCONTD: sodium chloride flush 0.9 % injection 5-40 mL    DISCONTD: sodium chloride flush 0.9 % injection 5-40 mL    DISCONTD: 0.9 % sodium chloride infusion    DISCONTD: ondansetron (ZOFRAN-ODT) disintegrating tablet 4 mg    DISCONTD: ondansetron (ZOFRAN) injection 4 mg    DISCONTD: acetaminophen (TYLENOL) tablet 650 mg    DISCONTD: acetaminophen (TYLENOL) suppository 650 mg    DISCONTD: sodium zirconium cyclosilicate (LOKELMA) oral suspension 10 g    0.9 % sodium chloride infusion    DISCONTD: pantoprazole (PROTONIX) tablet 40 mg     Please give BID x 8 weeks then daily    DISCONTD: zolpidem (AMBIEN) tablet 10 mg     Order Specific Question:   Please select a reason the therapeutic interchange was not accepted:      Answer:   [place] Needs for Pharmacy to [address] [place] [address]: allopurinol (ZYLOPRIM) tablet 100 mg    DISCONTD: cyclobenzaprine (FLEXERIL) tablet 10 mg    DISCONTD: pravastatin (PRAVACHOL) tablet 20 mg    DISCONTD: ascorbic acid (VITAMIN C) tablet 500 mg    pantoprazole (PROTONIX) 40 MG tablet     Sig: Take 1 tablet by mouth 2 times daily (before meals)     Dispense:  60 tablet     Refill:  1    DISCONTD: sodium zirconium cyclosilicate (LOKELMA) oral suspension 10 g           DIAGNOSTIC RESULTS / EMERGENCY DEPARTMENT COURSE / MDM     LABS:  Results for orders placed or performed during the hospital encounter of 01/04/22   Culture, Urine    Specimen: Urine, clean catch   Result Value Ref Range    Specimen Description . CLEAN CATCH URINE     Special Requests NOT REPORTED     Culture NO GROWTH    CBC Auto Differential   Result Value Ref Range    WBC 9.5 3.5 - 11.3 k/uL    RBC 3.52 (L) 4.21 - 5.77 m/uL    Hemoglobin 10.6 (L) 13.0 - 17.0 g/dL    Hematocrit 32.4 (L) 40.7 - 50.3 %    MCV 92.0 82.6 - 102.9 fL    MCH 30.1 25.2 - 33.5 pg    MCHC 32.7 28.4 - 34.8 g/dL    RDW 14.4 11.8 - 14.4 %    Platelets 760 203 - 426 k/uL    MPV 10.5 8.1 - 13.5 fL    NRBC Automated 0.0 0.0 per 100 WBC    Differential Type NOT REPORTED     Seg Neutrophils 59 36 - 65 %    Lymphocytes 27 24 - 43 %    Monocytes 8 3 - 12 %    Eosinophils % 3 1 - 4 %    Basophils 1 0 - 2 %    Immature Granulocytes 2 (H) 0 %    Segs Absolute 5.59 1.50 - 8.10 k/uL    Absolute Lymph # 2.56 1.10 - 3.70 k/uL    Absolute Mono # 0.79 0.10 - 1.20 k/uL    Absolute Eos # 0.32 0.00 - 0.44 k/uL    Basophils Absolute 0.06 0.00 - 0.20 k/uL    Absolute Immature Granulocyte 0.16 0.00 - 0.30 k/uL    WBC Morphology NOT REPORTED     RBC Morphology NOT REPORTED     Platelet Estimate NOT REPORTED    Comprehensive Metabolic Panel w/ Reflex to MG   Result Value Ref Range    Glucose 217 (H) 70 - 99 mg/dL    BUN 54 (H) 8 - 23 mg/dL    CREATININE 2.32 (H) 0.70 - 1.20 mg/dL    Bun/Cre Ratio NOT REPORTED 9 - 20    Calcium 8.9 8.6 - 10.4 mg/dL    Sodium 127 (L) 135 - 144 mmol/L    Potassium 6.4 (HH) 3.7 - 5.3 mmol/L    Chloride 102 98 - 107 mmol/L    CO2 14 (L) 20 - 31 mmol/L    Anion Gap 11 9 - 17 mmol/L    Alkaline Phosphatase 83 40 - 129 U/L    ALT 38 5 - 41 U/L    AST 31 <40 U/L    Total Bilirubin 0.32 0.3 - 1.2 mg/dL    Total Protein 7.2 6.4 - 8.3 g/dL    Albumin 4.2 3.5 - 5.2 g/dL    Albumin/Globulin Ratio 1.4 1.0 - 2.5    GFR Non-African American 29 (L) >60 mL/min    GFR  35 (L) >60 mL/min    GFR Comment          GFR Staging NOT REPORTED    Troponin   Result Value Ref Range    Troponin, High Sensitivity 59 (HH) 0 - 22 ng/L    Troponin T NOT REPORTED <0.03 ng/mL    Troponin Interp NOT REPORTED    Brain Natriuretic Peptide   Result Value Ref Range    Pro-BNP 88 <300 pg/mL    BNP Interpretation NOT REPORTED    SODIUM, URINE, RANDOM   Result Value Ref Range    Sodium,Ur 70 mmol/L   CHLORIDE, URINE, RANDOM   Result Value Ref Range    Chloride, Ur 82 mmol/L   Urinalysis with Microscopic   Result Value Ref Range    Color, UA Yellow Yellow    Turbidity UA Clear Clear    Glucose, Ur TRACE (A) NEGATIVE    Bilirubin Urine NEGATIVE NEGATIVE    Ketones, Urine NEGATIVE NEGATIVE    Specific Gravity, UA 1.020 1.005 - 1.030    Urine Hgb NEGATIVE NEGATIVE    pH, UA 5.0 5.0 - 8.0    Protein, UA 2+ (A) NEGATIVE    Urobilinogen, Urine Normal Normal    Nitrite, Urine NEGATIVE NEGATIVE    Leukocyte Esterase, Urine NEGATIVE NEGATIVE    -          WBC, UA None 0 - 5 /HPF    RBC, UA None 0 - 4 /HPF    Casts UA NOT REPORTED 0 - 8 /LPF    Crystals, UA NOT REPORTED None /HPF    Epithelial Cells UA 0 TO 2 0 - 5 /HPF    Renal Epithelial, UA NOT REPORTED 0 /HPF    Bacteria, UA NOT REPORTED None    Mucus, UA NOT REPORTED None    Trichomonas, UA NOT REPORTED None    Amorphous, UA NOT REPORTED None    Other Observations UA NOT REPORTED NOT REQ.     Yeast, UA NOT REPORTED None   BASIC METABOLIC PANEL   Result Value Ref Range    Glucose 366 (H) 70 - 99 mg/dL    BUN 45 (H) 8 - 23 mg/dL    CREATININE 2.13 (H) 0.70 - 1.20 mg/dL    Bun/Cre Ratio NOT REPORTED 9 - 20    Calcium 8.3 (L) 8.6 - 10.4 mg/dL    Sodium 132 (L) 135 - 144 mmol/L    Potassium 5.5 (H) 3.7 - 5.3 mmol/L    Chloride 101 98 - 107 mmol/L    CO2 20 20 - 31 mmol/L    Anion Gap 11 9 - 17 mmol/L    GFR Non-African American 32 (L) >60 mL/min    GFR  38 (L) >60 mL/min    GFR Comment          GFR Staging NOT REPORTED    CBC   Result Value Ref Range WBC 8.9 3.5 - 11.3 k/uL    RBC 3.52 (L) 4.21 - 5.77 m/uL    Hemoglobin 10.7 (L) 13.0 - 17.0 g/dL    Hematocrit 33.2 (L) 40.7 - 50.3 %    MCV 94.3 82.6 - 102.9 fL    MCH 30.4 25.2 - 33.5 pg    MCHC 32.2 28.4 - 34.8 g/dL    RDW 14.5 (H) 11.8 - 14.4 %    Platelets 447 081 - 120 k/uL    MPV 10.2 8.1 - 13.5 fL    NRBC Automated 0.0 0.0 per 100 WBC   Protime-INR   Result Value Ref Range    Protime 11.2 9.1 - 12.3 sec    INR 1.1    SPECIMEN REJECTION   Result Value Ref Range    Specimen Source . BLOOD     Ordered Test CMPX, MG     Reason for Rejection Unable to perform testing: Specimen hemolyzed.      - NOT REPORTED    Comprehensive Metabolic Panel w/ Reflex to MG   Result Value Ref Range    Glucose 169 (H) 70 - 99 mg/dL    BUN 42 (H) 8 - 23 mg/dL    CREATININE 2.07 (H) 0.70 - 1.20 mg/dL    Bun/Cre Ratio NOT REPORTED 9 - 20    Calcium 8.9 8.6 - 10.4 mg/dL    Sodium 133 (L) 135 - 144 mmol/L    Potassium 4.8 3.7 - 5.3 mmol/L    Chloride 105 98 - 107 mmol/L    CO2 15 (L) 20 - 31 mmol/L    Anion Gap 13 9 - 17 mmol/L    Alkaline Phosphatase 74 40 - 129 U/L    ALT 36 5 - 41 U/L    AST 28 <40 U/L    Total Bilirubin 0.34 0.3 - 1.2 mg/dL    Total Protein 7.0 6.4 - 8.3 g/dL    Albumin 3.8 3.5 - 5.2 g/dL    Albumin/Globulin Ratio 1.2 1.0 - 2.5    GFR Non- 33 (L) >60 mL/min    GFR African American 40 (L) >60 mL/min    GFR Comment          GFR Staging NOT REPORTED    Magnesium   Result Value Ref Range    Magnesium 1.9 1.6 - 2.6 mg/dL   HEMOGLOBIN AND HEMATOCRIT, BLOOD   Result Value Ref Range    Hemoglobin 10.5 (L) 13.0 - 17.0 g/dL    Hematocrit 31.3 (L) 40.7 - 50.3 %   HEMOGLOBIN AND HEMATOCRIT, BLOOD   Result Value Ref Range    Hemoglobin 11.1 (L) 13.0 - 17.0 g/dL    Hematocrit 33.4 (L) 40.7 - 50.3 %   HEMOGLOBIN AND HEMATOCRIT, BLOOD   Result Value Ref Range    Hemoglobin 10.6 (L) 13.0 - 17.0 g/dL    Hematocrit 32.1 (L) 40.7 - 50.3 %   HEMOGLOBIN AND HEMATOCRIT, BLOOD   Result Value Ref Range    Hemoglobin 11.1 (L) without requiring to pause to take a breath. Vital signs stable afebrile on chart review patient has Hemoccult blood from stool yesterday, creatinine function slightly worsening potassium 6. Will get repeat metabolic panel. No EKG changes    Potassium 6.4 we will consult with nephrology, recommends Lokelma, urine studies, insulin dextrose, fludrocortisone, 150 mill equivalent sodium bicarbonate infusion at 50 mL/h. We will admit to [facility]    PROCEDURES:  None    CONSULTS:  IP CONSULT TO NEPHROLOGY  IP CONSULT TO HOSPITALIST  IP CONSULT TO NEPHROLOGY  IP CONSULT TO GI    CRITICAL CARE:  None    FINAL IMPRESSION      1. Hyperkalemia    2. Gastrointestinal hemorrhage, unspecified gastrointestinal hemorrhage type    3.  [facility]          DISPOSITION / PLAN     DISPOSITION Admitted 01/04/2022 09:20:51 PM      PATIENT REFERRED TO:  Sharon Moser MD  [address] [place] [address]    Schedule an appointment as soon as possible for a visit  Please call to make a follow upa ppt in 3-4 weeks to discuss biopsy results    Juan Mchugh, APRN - CNP  [address] [facility] [place] [address]  [phone]            DISCHARGE MEDICATIONS:  Discharge Medication List as of 1/6/2022  9:22 AM      START taking these medications    Details   pantoprazole (PROTONIX) 40 MG tablet Take 1 tablet by mouth 2 times daily (before meals), Disp-60 tablet, R-1Normal             Brianda Foster MD  Emergency Medicine Resident    (Please note that portions of thisnote were completed with a voice recognition program.  Efforts were made to edit the dictations but occasionally words are mis-transcribed.)       Brianda Foster MD  Resident  01/08/22 [id number]

## 2022-01-08 ENCOUNTER — PATIENT MESSAGE (OUTPATIENT)
Dept: FAMILY MEDICINE CLINIC | Age: 62
End: 2022-01-08

## 2022-01-08 DIAGNOSIS — G47.00 INSOMNIA, UNSPECIFIED TYPE: ICD-10-CM

## 2022-01-10 ENCOUNTER — TELEPHONE (OUTPATIENT)
Dept: GASTROENTEROLOGY | Age: 62
End: 2022-01-10

## 2022-01-10 LAB
EKG ATRIAL RATE: 67 BPM
EKG P AXIS: 17 DEGREES
EKG P-R INTERVAL: 196 MS
EKG Q-T INTERVAL: 406 MS
EKG QRS DURATION: 88 MS
EKG QTC CALCULATION (BAZETT): 429 MS
EKG R AXIS: 9 DEGREES
EKG T AXIS: 33 DEGREES
EKG VENTRICULAR RATE: 67 BPM

## 2022-01-10 RX ORDER — ZOLPIDEM TARTRATE 12.5 MG/1
12.5 TABLET, FILM COATED, EXTENDED RELEASE ORAL NIGHTLY PRN
Qty: 90 TABLET | Refills: 0 | Status: SHIPPED | OUTPATIENT
Start: 2022-01-10 | End: 2022-04-22 | Stop reason: SDUPTHER

## 2022-01-10 NOTE — TELEPHONE ENCOUNTER
Writer called patient to f/u with Dr Mariama aC to discuss further evaluation since patient is taking Eliquis. Writer called patient to schedule f/u helene but no answer. Left message to call us back to schedule     Ok to double per Dr Mariama Ca, next avail I see in [email or web address] at [email or web address]. Please try to reach out to patient.

## 2022-01-10 NOTE — TELEPHONE ENCOUNTER
From: Phoenix Huang II  To: Maia Barragan  Sent: 1/8/2022 10:05 PM EST  Subject: Prescription Question    [facility] [address]

## 2022-01-11 NOTE — PROGRESS NOTES
Physician Progress Note      PATIENT:               Den Ford  CSN #:                  [id number]  :                       1960  ADMIT DATE:       2022 3:21 PM  [address] DATE:        2022 12:00 PM  RESPONDING  PROVIDER #:        Jeremy DELEON BLOOD DO          QUERY TEXT:    Patient admitted with GI BLEED, . Noted documentation of Acute Kidney Injury   on CKD in H&P and nephrology consult documenting CKD due to nephron loss with   baseline creat of 2-2.2 . on admission creat 2.32 down to 2.07. and GFR of 33. In order to support the diagnosis of DEBBI, please include additional   clinical indicators in your documentation. Or please document if the   diagnosis of DEBBI has been ruled out after further study    The medical record reflects the following:  Risk Factors: history of nephrectomy, age, GI bleed  Clinical Indicators: admitted with GI BLEED, . Noted documentation of Acute   Kidney Injury on CKD in H&P and nephrology consult documenting CKD due to   nephron loss with baseline creat of 2-2.2 . on admission creat 2.32 down to   2.07. and GFR of 33.   Treatment: lab monitoring iv fluids with sodium bicarbonate    Defined by Kidney Disease Improving Global Outcomes (KDIGO) clinical practice   guideline for acute kidney injury:  -Increase in SCr by greater than or equal to 0.3 mg/dl within 48 hours; or  -Increase or decrease in SCr to greater than or equal to 1.5 times baseline,   which is known or presumed to have occurred within the prior 7 days; or  -Urine volume < 0.5ml/kg/h for 6 hours    Thank you Call if questions Justus BENITEZ [facility]                                                                                                                                                                                                                                                                                                                                 [phone]  Options provided:  -- Acute kidney injury on CKD stage 3  evidenced by, Please document evidence   as well as baseline creatinine, if known. -- Currently resolved acute kidney injury on CKD stage 3  evidenced by, Please   document evidence as well as baseline creatinine, if known. -- CKD stage 3, Acute kidney injury ruled out after study  -- Other - I will add my own diagnosis  -- Disagree - Not applicable / Not valid  -- Disagree - Clinically unable to determine / Unknown  -- Refer to Clinical Documentation Reviewer    PROVIDER RESPONSE TEXT:    CKD stage 3, Acute kidney injury was ruled out after study. Query created by:  Keny Desouza on 1/6/2022 10:10 AM      Electronically signed by:  iRc WOOD DO 1/11/2022 4:31 PM

## 2022-01-13 NOTE — TELEPHONE ENCOUNTER
Attempted to call pt but no answer. Voicemail box is full and writer unable to leave message. Will attempt to call again later today. Pt needs scheduled for hospital follow up.

## 2022-01-14 NOTE — TELEPHONE ENCOUNTER
Called pt; no answer. LVM informing he is being scheduled for OV with Dr Tone Graham on 1/21/22 at [facility] at 145pm.    Also sending [facility] informing him of appt.

## 2022-01-21 ENCOUNTER — OFFICE VISIT (OUTPATIENT)
Dept: GASTROENTEROLOGY | Age: 62
End: 2022-01-21
Payer: COMMERCIAL

## 2022-01-21 ENCOUNTER — TELEPHONE (OUTPATIENT)
Dept: GASTROENTEROLOGY | Age: 62
End: 2022-01-21

## 2022-01-21 VITALS — DIASTOLIC BLOOD PRESSURE: 89 MMHG | BODY MASS INDEX: 39.05 KG/M2 | SYSTOLIC BLOOD PRESSURE: 139 MMHG | WEIGHT: 280 LBS

## 2022-01-21 DIAGNOSIS — K92.1 GASTROINTESTINAL HEMORRHAGE WITH MELENA: Primary | ICD-10-CM

## 2022-01-21 PROCEDURE — 1036F TOBACCO NON-USER: CPT | Performed by: INTERNAL MEDICINE

## 2022-01-21 PROCEDURE — 3017F COLORECTAL CA SCREEN DOC REV: CPT | Performed by: INTERNAL MEDICINE

## 2022-01-21 PROCEDURE — 99213 OFFICE O/P EST LOW 20 MIN: CPT | Performed by: INTERNAL MEDICINE

## 2022-01-21 PROCEDURE — G8427 DOCREV CUR MEDS BY ELIG CLIN: HCPCS | Performed by: INTERNAL MEDICINE

## 2022-01-21 PROCEDURE — G8484 FLU IMMUNIZE NO ADMIN: HCPCS | Performed by: INTERNAL MEDICINE

## 2022-01-21 PROCEDURE — G8417 CALC BMI ABV UP PARAM F/U: HCPCS | Performed by: INTERNAL MEDICINE

## 2022-01-21 PROCEDURE — 1111F DSCHRG MED/CURRENT MED MERGE: CPT | Performed by: INTERNAL MEDICINE

## 2022-01-21 RX ORDER — SIMETHICONE 80 MG
80 TABLET,CHEWABLE ORAL 4 TIMES DAILY PRN
Qty: 180 TABLET | Refills: 3 | Status: SHIPPED | OUTPATIENT
Start: 2022-01-21

## 2022-01-21 ASSESSMENT — ENCOUNTER SYMPTOMS
RESPIRATORY NEGATIVE: 1
GASTROINTESTINAL NEGATIVE: 1
EYES NEGATIVE: 1

## 2022-01-21 NOTE — TELEPHONE ENCOUNTER
Dr Allison Lassiter at [facility] on 1/21/22 ordered EGD/colon to be scheduled w/ José Miguel. Pt is on Eliquis & aspirin. Dr Tone Graham asks to hold aspirin 24 hrs prior to proc. Clearance sent to Dr Lucie Funk. Proc needs to be scheduled. Pt looking to schedule on a Monday. Pt has one kidney and kidney disease. Pharmacy is [facility], [address] [place] [address]. Pt is NOT covid-19 vaccinated and will need covid test scheduled.

## 2022-01-21 NOTE — PROGRESS NOTES
GI FOLLOW UP    INTERVAL HISTORY:     Recent hospitalization for GI bleed, identified to have LA grade B esophagitis  Needs outpatient colonoscopy  Denies any further bleeding  On Eliquis    Chief Complaint   Patient presents with    Follow-up     EGD follow up- LA grade B esophagitis - needs outpatient colonoscopy    Other     Patient is taking protonix 40mg BID for esophagitis. Denies any heartburn or reflux. Denies abdominal pain. States he has not had any rectal bleeding since being in ED. Denies melena. 1. Gastrointestinal hemorrhage with melena          HISTORY OF PRESENT ILLNESS: Mr.Donald GONZÁLEZ Denton II is a 57 y.o. male with a past history remarkable for HTN, HL,  A-fib, prior history of right-sided nephrectomy with benign lesion, complicated by right-sided peritoneal infection status post IR guided percutaneous drain in 2020, no transient formation of ileostomy with bowel resection with primary anastomosis after reversal (?  Ileocolonic), abdominal wall hernia, gout, referred for evaluation of outpatient EGD and colonoscopy           Past Medical,Family, and Social History reviewed and does contribute to the patient presenting condition. Patient's PMH/PSH,SH,PSYCH Hx, MEDs, ALLERGIES, and ROS were all reviewed and updated in the appropriate sections. PAST MEDICAL HISTORY:  Past Medical History:   Diagnosis Date    Arthritis     BILATERAL Liv Galas right shoulder    Back pain     Colostomy RUQ 09/23/2020    Frequent headaches     10/28/2020 PATIENT STATES EVERY OTHER DAY.  Gout     History of atrial fibrillation     AFTER SURGERY ON 09/23/2020 WENT INTO A FIB. CONVERTED BACK TO NORMAL RHYTHM ON HIS OWN.  CARDIOLOGIST DR Wilner Augustin History of blood transfusion     NO REACTION    [place] (hard of hearing)     HTN     [employer], CNP    Hyperkalemia     Hyperlipidemia     Incisional hernia     Kidney infection     Sepsis ([facility] [address]     post surgery. leak in bowel    Sleep apnea     USES CPAP MACHINE    T2DM     K. Patricia Israel CNP.  diet controlled    Tooth missing     RIGHT UPPER 2ND MOLAR    Under care of team 08/2021    Dr. Ping Garduno Wears glasses     READING    Wellness examination 07/2021    Manas Tucker CNP [facility]        Past Surgical History:   Procedure Laterality Date    ADENOIDECTOMY      TWICE AS A CHILD    ANKLE SURGERY Right 2010    RUPTURED ACHILES    CARPAL TUNNEL RELEASE Bilateral     COLOSTOMY      temporary    CYSTO/URETERO/PYELOSCOPY, CALCULUS TX Right 10/30/2020    HOLMIUM-STANDBY, CYSTOSCOPY, URETEROSCOPY, STENT EXCHANGE performed by Haylee Ortez MD at [address] [place] Right 09/01/2020    CYSTOSCOPY RIGHT URETERAL STENT INSERTION performed by Ana Raman MD at [address] [facility] CATH POWER PICC TRIPLE  09/03/2020    REMOVED AROUND 10/07/2020    KIDNEY SURGERY Left 09/02/2020    LAPAROSCOPIC XI ROBOTIC NEPHRECTOMY performed by Salty Rivera MD at [address] [facility] ARTHROSCOPY Bilateral     LAPAROTOMY N/A 09/23/2020    LAPAROTOMY EXPLORATORY performed by Raya Diaz DO at [address] [place] [address] N/A 09/02/2020    EXPLORATORY LAPAROTOMY, RESECTION OF PROXIMAL JEJUNUM AND DESCENDING COLON WITH MOBILIZATION OF SPLENIC FLEXURE AND PRIMARY ANASTAMOSISOF SMALL BOWEL AND COLON, PLACEMENT OF GASTROSTOMY TUBE performed by Raya Diaz DO at [address] [place] [address] 12/18/2020    EXPLORATORY LAPAROTOMY,COLOSTOMY REVERSAL, TAP BLOCK DONE IN OR BY DR Sandra Caldwell performed by Raya Diaz DO at [address] [place] [address] 3 TIMES  AS A CHILD    TYMPANOSTOMY TUBE PLACEMENT      UPPER GASTROINTESTINAL ENDOSCOPY N/A 1/5/2022    EGD BIOPSY performed by Yasmine Bourgeois MD at [facility]       CURRENT MEDICATIONS:    Current Outpatient Medications:     hydrOXYzine (ATARAX) 50 MG tablet, TAKE 1 TABLET BY MOUTH EVERY DAY AT NIGHT, Disp: 90 tablet, Rfl: 3    zolpidem (AMBIEN CR) 12.5 MG extended release tablet, Take 1 tablet by mouth nightly as needed for Sleep for up to 90 days. , Disp: 90 tablet, Rfl: 0    pantoprazole (PROTONIX) 40 MG tablet, Take 1 tablet by mouth 2 times daily (before meals), Disp: 60 tablet, Rfl: 1    sodium zirconium cyclosilicate (LOKELMA) 10 g PACK oral suspension, Take 10 g by mouth daily, Disp: , Rfl:     allopurinol (ZYLOPRIM) 100 MG tablet, TAKE 1 TABLET BY MOUTH EVERY DAY, Disp: 90 tablet, Rfl: 0    cyclobenzaprine (FLEXERIL) 10 MG tablet, TAKE 1 TABLET BY MOUTH 2 TIMES DAILY AS NEEDED FOR MUSCLE SPASMS, Disp: 180 tablet, Rfl: 1    metoprolol (LOPRESSOR) 100 MG tablet, Take 100 mg by mouth 2 times daily , Disp: , Rfl:     dilTIAZem (CARDIZEM) 60 MG tablet, Take 1 tablet by mouth every 12 hours, Disp: 180 tablet, Rfl: 3    blood glucose test strips (TRUE METRIX BLOOD GLUCOSE TEST) strip, PLEASE SEE ATTACHED FOR DETAILED DIRECTIONS, Disp: 150 strip, Rfl: 0    ferrous sulfate (IRON 325) 325 (65 Fe) MG tablet, Take 1 tablet by mouth daily (with breakfast), Disp: 180 tablet, Rfl: 1    apixaban (ELIQUIS) 5 MG TABS tablet, Take 1 tablet by mouth 2 times daily, Disp: 180 tablet, Rfl: 1    CPAP Machine MISC, use as directed, Disp: , Rfl:     Respiratory Therapy Supplies (CARETOUCH CPAP & BIPAP HOSE) MISC, Mask and Tubing, Disp: , Rfl:     Melatonin 10 MG TABS, Take 1 tablet by mouth nightly as needed, Disp: , Rfl:     Multiple Vitamins-Minerals (THERAPEUTIC MULTIVITAMIN-MINERALS) tablet, Take 1 tablet by mouth daily, Disp: 90 tablet, Rfl: 1    vitamin C (ASCORBIC ACID) 500 MG tablet, Take 500 mg by mouth daily, Disp: , Rfl:     aspirin 81 MG tablet, Take 81 mg by mouth nightly Stopped 12/14/2020, Disp: , Rfl:     pravastatin (PRAVACHOL) 20 MG tablet, Take 20 mg by mouth daily, Disp: , Rfl:     acetaminophen (TYLENOL) 500 MG tablet, Take 2 tablets by mouth every 6 hours as needed for Pain, Disp: 40 tablet, Rfl: 0    ALLERGIES:   Allergies   Allergen Reactions    Ampicillin Swelling     Swelling of throat.  Pcn [Penicillins] Swelling     Throat swelling. Tolerated cefepime during 8/31/20 admission.  Sulfa Antibiotics      Other reaction(s): Unknown    Tape [Adhesive Tape] Other (See Comments)     CAUSES REDNESS. PAPER TAPE OK. FAMILY HISTORY:       Problem Relation Age of Onset    Stroke Maternal Grandmother     Stroke Maternal Grandfather     Other Mother         AORTIC BYPASS LED TO KIDNEY FAILURE    Kidney Disease Mother     Diabetes Father     Stroke Father          SOCIAL HISTORY:   Social History     Socioeconomic History    Marital status:      Spouse name: Not on file    Number of children: Not on file    Years of education: Not on file    Highest education level: Not on file   Occupational History    Not on file   Tobacco Use    Smoking status: Never Smoker    Smokeless tobacco: Former User     Types: Chew   Vaping Use    Vaping Use: Never used   Substance and Sexual Activity    Alcohol use: Yes     Alcohol/week: 2.0 standard drinks     Types: 2 Cans of beer per week     Comment: socially     Drug use: Yes     Types: Marijuana [employer])     Comment: edibles    Sexual activity: Not Currently   Other Topics Concern    Not on file   Social History Narrative    Not on file     Social Determinants of Health     Financial Resource Strain:     Difficulty of Paying Living Expenses: Not on file   Food Insecurity:     Worried About Running Out of Food in the Last Year: Not on file    [place] of Food in the Last Year: Not on file   Transportation Needs:     Lack of Transportation (Medical): Not on file    Lack of Transportation (Non-Medical):  Not on file   Physical Activity:     Days of Exercise per Week: Not on file    Minutes of Exercise per Session: Not on file   Stress:     Feeling of Stress : Not on file   Social Connections:     Frequency of Communication with Friends and Family: Not on file    Frequency of Social Gatherings with Friends and Family: Not on file    Attends Protestant Services: Not on file    Active Member of Clubs or Organizations: Not on file    Attends Club or Organization Meetings: Not on file    Marital Status: Not on file   Intimate Partner Violence:     Fear of Current or Ex-Partner: Not on file    Emotionally Abused: Not on file    Physically Abused: Not on file    Sexually Abused: Not on file   Housing Stability:     Unable to Pay for Housing in the Last Year: Not on file    Number of [place] in the Last Year: Not on file    Unstable Housing in the Last Year: Not on file       REVIEW OF SYSTEMS: A 12-point review of systems was obtained and pertinent positives and negatives were listed below. REVIEW OF SYSTEMS:     Constitutional: No fever, no chills, no lethargy, no weakness. HEENT:  No headache, otalgia, itchy eyes, nasal discharge or sore throat. Cardiac:  No chest pain, dyspnea, orthopnea or PND. Chest:   No cough, phlegm or wheezing. Abdomen:      Detailed by MA   Neuro:  No focal weakness, abnormal movements or seizure like activity. Skin:   No rashes, no itching. :   No hematuria, no pyuria, no dysuria, no flank pain. Extremities:  No swelling or joint pains. ROS was otherwise negative    Review of Systems   Constitutional: Positive for appetite change. HENT: Negative. Eyes: Negative. Respiratory: Negative. Cardiovascular: Negative. Gastrointestinal: Negative. Endocrine: Negative. Genitourinary: Negative. Musculoskeletal: Negative. Skin: Negative. Allergic/Immunologic: Positive for food allergies. Neurological: Negative. Hematological: Bruises/bleeds easily. Psychiatric/Behavioral: Negative. All other systems reviewed and are negative. PHYSICAL EXAMINATION: Vital signs reviewed per the nursing documentation.      /89   Wt 280 lb (127 kg)   BMI 39.05 kg/m²   Body mass index is 39.05 kg/m². Physical Exam    Physical Exam   Constitutional: Patient is oriented to person, place, and time. Patient appears well-developed and well-nourished. HENT:   Head: Normocephalic and atraumatic. Eyes: Pupils are equal, round, and reactive to light. EOM are normal.   Neck: Normal range of motion. Neck supple. No JVD present. No tracheal deviation present. No thyromegaly present. Cardiovascular: Normal rate, regular rhythm, normal heart sounds and intact distal pulses. Pulmonary/Chest: Effort normal and breath sounds normal. No stridor. No respiratory distress. He has no wheezes. He has no rales. He exhibits no tenderness. Abdominal: Soft. Bowel sounds are normal. He exhibits no distension and no mass. There is no tenderness. There is no rebound and no guarding. No hernia. Musculoskeletal: Normal range of motion. Lymphadenopathy:    Patient has no cervical adenopathy. Neurological: Patient is alert and oriented to person, place, and time. Psychiatric: Patient has a normal mood and affect.  Patient behavior is normal.       LABORATORY DATA: Reviewed  Lab Results   Component Value Date    WBC 8.9 01/05/2022    HGB 11.1 (L) 01/06/2022    HCT 33.7 (L) 01/06/2022    MCV 94.3 01/05/2022     01/05/2022     (L) 01/05/2022    K 4.8 01/05/2022     01/05/2022    CO2 15 (L) 01/05/2022    BUN 42 (H) 01/05/2022    CREATININE 2.07 (H) 01/05/2022    LABALBU 3.8 01/05/2022    BILITOT 0.34 01/05/2022    ALKPHOS 74 01/05/2022    AST 28 01/05/2022    ALT 36 01/05/2022    INR 1.1 01/05/2022         Lab Results   Component Value Date    RBC 3.52 (L) 01/05/2022    HGB 11.1 (L) 01/06/2022    MCV 94.3 01/05/2022    MCH 30.4 01/05/2022    MCHC 32.2 01/05/2022    RDW 14.5 (H) 01/05/2022    MPV 10.2 01/05/2022    BASOPCT 1 01/04/2022    LYMPHSABS 2.56 01/04/2022    MONOSABS 0.79 01/04/2022    NEUTROABS 5.59 01/04/2022    EOSABS 0.32 01/04/2022 patient. He agrees to proceed. Continue with PPI twice daily until after endoscopic evaluation. 2) need clearance to hold eiquis for 3 days before procedure     3) Follow up in 2-3 months. Thank you for allowing me to participate in the care of Mr. Louie Holder. For any further questions please do not hesitate to contact me. I have reviewed and agree with the ROS entered by the MA/LPN from today's encounter documented in a separate note. Diego Ayala MD, MPH   [facility]astroenter[facility]  Office #: [phone]    this note is created with the assistance of a speech recognition program.  While intending to generate a document that actually reflects the content of the visit, the document can still have some errors including those of syntax and sound a like substitutions which may escape proof reading. It such instances, actual meaning can be extrapolated by contextual diversion.

## 2022-02-01 NOTE — TELEPHONE ENCOUNTER
Writer called Dr Luba Sewell office to f/u with patients clearance. Tatyana from Dr Luba Sewell stated patient need to f/u for clearance since he hasn't be seen Aug 2021. Writer tried to call patient to notify him to f/u with Dr DAVID [facility] office but mail box full.

## 2022-02-08 NOTE — TELEPHONE ENCOUNTER
Writer called Dr Bettye Du office to inquired about clearance. Spoke with Candance who stated patient never called to schedule helene to get clearance. Writer called patient no answer left detail message stating he needs to schedule helene with Cardiology Dr Tino Yoon to get clearance for Eliquis/ASA in order to have procedure.

## 2022-02-10 RX ORDER — DILTIAZEM HYDROCHLORIDE 60 MG/1
60 TABLET, FILM COATED ORAL EVERY 12 HOURS
Qty: 180 TABLET | Refills: 3 | Status: SHIPPED | OUTPATIENT
Start: 2022-02-10 | End: 2022-09-13 | Stop reason: SDUPTHER

## 2022-02-17 NOTE — TELEPHONE ENCOUNTER
Writer called Dr Gen Frye office to inquired about clearance, spoke  With rios who said they have attempted several time to schedule patient for clearance and can't reach him. Writer attempt to call patient to make him aware of clearance needed but no answer. Left detail message to please call Cardiology office to schedule for Eliquis clearance. Non-compliance  With Cardiology appt for Eliquis clearance   Writer will let Dr Albin Santillan know. Patient has f/u on [email or web address]. Should I cancel f/u ?

## 2022-02-17 NOTE — TELEPHONE ENCOUNTER
F/u helene cancelled and voice mail left informing patient once he schedules with Cardiology for clearance we will proceed with procedure and f/u helene with GI. All medical clearance are faxed over to the provider we are requesting the clearance from. I keep all medical clearances in a blue folder until I get the clearance back or I f/u with those provider if we dont get them with-in 1-2 wks.

## 2022-03-16 RX ORDER — ALLOPURINOL 100 MG/1
TABLET ORAL
Qty: 90 TABLET | Refills: 3 | Status: SHIPPED | OUTPATIENT
Start: 2022-03-16

## 2022-04-21 ENCOUNTER — PATIENT MESSAGE (OUTPATIENT)
Dept: FAMILY MEDICINE CLINIC | Age: 62
End: 2022-04-21

## 2022-04-21 DIAGNOSIS — G47.00 INSOMNIA, UNSPECIFIED TYPE: ICD-10-CM

## 2022-04-21 NOTE — TELEPHONE ENCOUNTER
From: Judy Sousa II  To: Marilee Render  Sent: 4/21/2022 3:37 PM EDT  Subject: Prescription Question    Irish Wright this is [place] I need a refill for zolpidem pj 12.5 mg tablet qty 90 days worth also pay out of pocket so no insurance

## 2022-04-22 RX ORDER — ZOLPIDEM TARTRATE 12.5 MG/1
12.5 TABLET, FILM COATED, EXTENDED RELEASE ORAL NIGHTLY PRN
Qty: 90 TABLET | Refills: 1 | Status: SHIPPED | OUTPATIENT
Start: 2022-04-22 | End: 2022-10-24 | Stop reason: SDUPTHER

## 2022-05-03 ENCOUNTER — OFFICE VISIT (OUTPATIENT)
Dept: FAMILY MEDICINE CLINIC | Age: 62
End: 2022-05-03
Payer: COMMERCIAL

## 2022-05-03 ENCOUNTER — TELEPHONE (OUTPATIENT)
Dept: PHARMACY | Age: 62
End: 2022-05-03

## 2022-05-03 VITALS
OXYGEN SATURATION: 97 % | WEIGHT: 280.2 LBS | BODY MASS INDEX: 39.23 KG/M2 | HEART RATE: 92 BPM | DIASTOLIC BLOOD PRESSURE: 86 MMHG | SYSTOLIC BLOOD PRESSURE: 132 MMHG | TEMPERATURE: 98.5 F | HEIGHT: 71 IN

## 2022-05-03 DIAGNOSIS — E78.1 HYPERTRIGLYCERIDEMIA: ICD-10-CM

## 2022-05-03 DIAGNOSIS — I48.0 PAROXYSMAL ATRIAL FIBRILLATION (HCC): ICD-10-CM

## 2022-05-03 DIAGNOSIS — K43.2 INCISIONAL HERNIA, WITHOUT OBSTRUCTION OR GANGRENE: Primary | ICD-10-CM

## 2022-05-03 PROCEDURE — 3017F COLORECTAL CA SCREEN DOC REV: CPT | Performed by: NURSE PRACTITIONER

## 2022-05-03 PROCEDURE — G8417 CALC BMI ABV UP PARAM F/U: HCPCS | Performed by: NURSE PRACTITIONER

## 2022-05-03 PROCEDURE — 99213 OFFICE O/P EST LOW 20 MIN: CPT | Performed by: NURSE PRACTITIONER

## 2022-05-03 PROCEDURE — 1036F TOBACCO NON-USER: CPT | Performed by: NURSE PRACTITIONER

## 2022-05-03 PROCEDURE — G8427 DOCREV CUR MEDS BY ELIG CLIN: HCPCS | Performed by: NURSE PRACTITIONER

## 2022-05-03 RX ORDER — PRAVASTATIN SODIUM 20 MG
20 TABLET ORAL DAILY
Qty: 30 TABLET | Refills: 5 | Status: SHIPPED | OUTPATIENT
Start: 2022-05-03 | End: 2022-05-28

## 2022-05-03 RX ORDER — DULOXETIN HYDROCHLORIDE 20 MG/1
20 CAPSULE, DELAYED RELEASE ORAL DAILY
COMMUNITY

## 2022-05-03 RX ORDER — SODIUM BICARBONATE 650 MG/1
650 TABLET ORAL 4 TIMES DAILY
COMMUNITY

## 2022-05-03 SDOH — ECONOMIC STABILITY: FOOD INSECURITY: WITHIN THE PAST 12 MONTHS, THE FOOD YOU BOUGHT JUST DIDN'T LAST AND YOU DIDN'T HAVE MONEY TO GET MORE.: NEVER TRUE

## 2022-05-03 SDOH — ECONOMIC STABILITY: FOOD INSECURITY: WITHIN THE PAST 12 MONTHS, YOU WORRIED THAT YOUR FOOD WOULD RUN OUT BEFORE YOU GOT MONEY TO BUY MORE.: NEVER TRUE

## 2022-05-03 ASSESSMENT — ENCOUNTER SYMPTOMS
SINUS PAIN: 0
ABDOMINAL PAIN: 1
DIARRHEA: 0
BACK PAIN: 0
NAUSEA: 0
COUGH: 0
SHORTNESS OF BREATH: 0
SORE THROAT: 0
VOMITING: 0
EYE PAIN: 0

## 2022-05-03 ASSESSMENT — PATIENT HEALTH QUESTIONNAIRE - PHQ9
SUM OF ALL RESPONSES TO PHQ QUESTIONS 1-9: 0
1. LITTLE INTEREST OR PLEASURE IN DOING THINGS: 0
SUM OF ALL RESPONSES TO PHQ QUESTIONS 1-9: 0
2. FEELING DOWN, DEPRESSED OR HOPELESS: 0
SUM OF ALL RESPONSES TO PHQ QUESTIONS 1-9: 0
SUM OF ALL RESPONSES TO PHQ9 QUESTIONS 1 & 2: 0
SUM OF ALL RESPONSES TO PHQ QUESTIONS 1-9: 0

## 2022-05-03 ASSESSMENT — SOCIAL DETERMINANTS OF HEALTH (SDOH): HOW HARD IS IT FOR YOU TO PAY FOR THE VERY BASICS LIKE FOOD, HOUSING, MEDICAL CARE, AND HEATING?: NOT HARD AT ALL

## 2022-05-03 NOTE — PATIENT INSTRUCTIONS
Patient Education        Hernia: Care Instructions  Your Care Instructions     A hernia develops when tissue bulges through a weak spot in the wall of your belly. The groin area and the navel are common areas for a hernia. A hernia canalso develop near the area of a surgery you had before. Pressure from lifting, straining, or coughing can tear the weak area, causingthe hernia to bulge and be painful. If you cannot push a hernia back into place, the tissue may become trapped outside the belly wall. If the hernia gets twisted and loses its blood supply, it will swell and die. This is called a strangulated hernia. It usually causesa lot of pain. It needs treatment right away. Some hernias need to be repaired to prevent a strangulated hernia. If yourhernia causes symptoms or is large, you may need surgery. Follow-up care is a key part of your treatment and safety. Be sure to make and go to all appointments, and call your doctor if you are having problems. It's also a good idea to know your test results and keep alist of the medicines you take. How can you care for yourself at home?  Take care when lifting heavy objects.  Stay at a healthy weight.  Do not smoke. Smoking can cause coughing, which can cause your hernia to bulge. If you need help quitting, talk to your doctor about stop-smoking programs and medicines. These can increase your chances of quitting for good.  Talk with your doctor before wearing a corset or truss for a hernia. These devices are not recommended for treating hernias and sometimes can do more harm than good. There may be certain situations when your doctor thinks a truss would work, but these are rare. When should you call for help? Call your doctor now or seek immediate medical care if:     You have new or worse belly pain.      You are vomiting.      You cannot pass stools or gas.      You cannot push the hernia back into place with gentle pressure when you are lying down.    The area over the hernia turns red or becomes tender. Watch closely for changes in your health, and be sure to contact your doctor ifyou have any problems. Where can you learn more? Go to https://[employer]eZWay. org and sign in to your [employer] account. Enter C129 in the Transmit box to learn more about \"Hernia: Care Instructions. \"     If you do not have an account, please click on the \"Sign Up Now\" link. Current as of: September 8, 2021               Content Version: 13.2  © 6347-0623 [employer], [employer]. Care instructions adapted under license by [facility] [place] ([facility]). If you have questions about a medical condition or this instruction, always ask your healthcare professional. [address] any warranty or liability for your use of this information.

## 2022-05-03 NOTE — PROGRESS NOTES
[address] [facility] [facility]  [address] [facility] [address] [address]  Dept: [phone]  Dept Fax: [phone]    Eva Woodward II is a 57 y.o. male who presents today for his medicalconditions/complaints as noted below. Eva Woodward II is c/o of Hernia (pt was having pain at his hernia site )      HPI:         57-year-old male patient presents with complaints of abdominal pain. Patient has a known history of an incisional hernia. Significant abdominal history including colostomy, colostomy reversal, several bowel obstructions, laparotomy. Patient follows with general surgeon Dr. Bryanna Kraus. Has a known incisional site hernia to the anterior abdomen. Reports that yesterday he had discomfort which has improved today. Denies fevers or chills. Denies nausea or vomiting. There is no significant redness warmth or severe tenderness to the hernia. Reports that he is to undergo a hernia repair next month per his general surgeon after following up with his nephrologist regarding his elevated blood potassium levels which she is treating with Vesta Gaucher. Past Medical History:   Diagnosis Date    Arthritis     BILATERAL KNEESand right shoulder    Back pain     Colostomy RUQ 09/23/2020    Frequent headaches     10/28/2020 PATIENT STATES EVERY OTHER DAY.  Gout     History of atrial fibrillation     AFTER SURGERY ON 09/23/2020 WENT INTO A FIB. CONVERTED BACK TO NORMAL RHYTHM ON HIS OWN. CARDIOLOGIST DR Davis Simple History of blood transfusion     NO REACTION    [place] (hard of hearing)     HTN     Jens Domínguez CNP    Hyperkalemia     Hyperlipidemia     Incisional hernia     Kidney infection     Sepsis ([address]     post surgery. leak in bowel    Sleep apnea     USES CPAP MACHINE    T2DM     K. Morris Good CNP.  diet controlled    Tooth missing     RIGHT UPPER 2ND MOLAR    Under care of team 08/2021    Dr. Maxwell Rosales [facility]    Wears glasses     READING  Wellness examination 07/2021    Soha Smith CNP [facility]         Current Outpatient Medications   Medication Sig Dispense Refill    DULoxetine (CYMBALTA) 20 MG extended release capsule Take 20 mg by mouth daily      sodium bicarbonate 650 MG tablet Take 650 mg by mouth 4 times daily      pravastatin (PRAVACHOL) 20 MG tablet Take 1 tablet by mouth daily 30 tablet 5    zolpidem (AMBIEN CR) 12.5 MG extended release tablet Take 1 tablet by mouth nightly as needed for Sleep for up to 90 days.  90 tablet 1    allopurinol (ZYLOPRIM) 100 MG tablet TAKE 1 TABLET BY MOUTH EVERY DAY 90 tablet 3    dilTIAZem (CARDIZEM) 60 MG tablet TAKE 1 TABLET BY MOUTH EVERY 12 HOURS 180 tablet 3    simethicone (MYLICON) 80 MG chewable tablet Take 1 tablet by mouth 4 times daily as needed for Flatulence 180 tablet 3    hydrOXYzine (ATARAX) 50 MG tablet TAKE 1 TABLET BY MOUTH EVERY DAY AT NIGHT 90 tablet 3    pantoprazole (PROTONIX) 40 MG tablet Take 1 tablet by mouth 2 times daily (before meals) 60 tablet 1    sodium zirconium cyclosilicate (LOKELMA) 10 g PACK oral suspension Take 10 g by mouth daily      cyclobenzaprine (FLEXERIL) 10 MG tablet TAKE 1 TABLET BY MOUTH 2 TIMES DAILY AS NEEDED FOR MUSCLE SPASMS 180 tablet 1    metoprolol (LOPRESSOR) 100 MG tablet Take 100 mg by mouth 2 times daily       blood glucose test strips (TRUE METRIX BLOOD GLUCOSE TEST) strip PLEASE SEE ATTACHED FOR DETAILED DIRECTIONS 150 strip 0    ferrous sulfate (IRON 325) 325 (65 Fe) MG tablet Take 1 tablet by mouth daily (with breakfast) 180 tablet 1    CPAP Machine MISC use as directed      Respiratory Therapy Supplies (CARETOUCH CPAP & BIPAP HOSE) MISC Mask and Tubing      Melatonin 10 MG TABS Take 1 tablet by mouth nightly as needed      Multiple Vitamins-Minerals (THERAPEUTIC MULTIVITAMIN-MINERALS) tablet Take 1 tablet by mouth daily 90 tablet 1    vitamin C (ASCORBIC ACID) 500 MG tablet Take 500 mg by mouth daily      aspirin 81 MG tablet Take 81 mg by mouth nightly Stopped 12/14/2020      apixaban (ELIQUIS) 5 MG TABS tablet Take 1 tablet by mouth 2 times daily (Patient not taking: Reported on 5/3/2022) 180 tablet 1    acetaminophen (TYLENOL) 500 MG tablet Take 2 tablets by mouth every 6 hours as needed for Pain 40 tablet 0     No current facility-administered medications for this visit. Allergies   Allergen Reactions    Ampicillin Swelling     Swelling of throat.  Pcn [Penicillins] Swelling     Throat swelling. Tolerated cefepime during 8/31/20 admission.  Sulfa Antibiotics      Other reaction(s): Unknown    Tape [Adhesive Tape] Other (See Comments)     CAUSES REDNESS. PAPER TAPE OK. Subjective:      Review of Systems   Constitutional: Negative for chills and fatigue. HENT: Negative for congestion, ear pain, sinus pain and sore throat. Eyes: Negative for pain and visual disturbance. Respiratory: Negative for cough and shortness of breath. Cardiovascular: Negative for chest pain and palpitations. Gastrointestinal: Positive for abdominal pain. Negative for diarrhea, nausea and vomiting. Genitourinary: Negative for penile pain and testicular pain. Musculoskeletal: Negative for back pain, joint swelling and neck pain. Skin: Negative for rash. Neurological: Negative for dizziness and light-headedness. Hematological: Does not bruise/bleed easily. All other systems reviewed and are negative.      :Objective     Physical Exam  Vitals and nursing note reviewed. Constitutional:       General: He is not in acute distress. Appearance: Normal appearance. He is not toxic-appearing. Cardiovascular:      Rate and Rhythm: Normal rate. Pulmonary:      Effort: Pulmonary effort is normal.      Breath sounds: Normal breath sounds. Abdominal:      Palpations: Abdomen is soft. Hernia: A hernia is present. Skin:     General: Skin is warm and dry.    Neurological:      General: No focal deficit present. Mental Status: He is alert and oriented to person, place, and time.        /86 (Site: Right Upper Arm, Position: Sitting, Cuff Size: Medium Adult)   Pulse 92   Temp 98.5 °F (36.9 °C) (Tympanic)   Ht 5' 11\" (1.803 m)   Wt 280 lb 3.2 oz (127.1 kg)   SpO2 97%   BMI 39.08 kg/m²     Lab Review   Admission on 01/04/2022, Discharged on 01/06/2022   Component Date Value    Ventricular Rate 01/04/2022 67     Atrial Rate 01/04/2022 67     P-R Interval 01/04/2022 196     QRS Duration 01/04/2022 88     Q-T Interval 01/04/2022 406     QTc Calculation (Bazett) 01/04/2022 429     P Axis 01/04/2022 17     R Axis 01/04/2022 9     T Axis 01/04/2022 33     WBC 01/04/2022 9.5     RBC 01/04/2022 3.52*    Hemoglobin 01/04/2022 10.6*    Hematocrit 01/04/2022 32.4*    MCV 01/04/2022 92.0     MCH 01/04/2022 30.1     MCHC 01/04/2022 32.7     RDW 01/04/2022 14.4     Platelets 21/80/6943 204     MPV 01/04/2022 10.5     NRBC Automated 01/04/2022 0.0     Differential Type 01/04/2022 NOT REPORTED     Seg Neutrophils 01/04/2022 59     Lymphocytes 01/04/2022 27     Monocytes 01/04/2022 8     Eosinophils % 01/04/2022 3     Basophils 01/04/2022 1     Immature Granulocytes 01/04/2022 2*    Segs Absolute 01/04/2022 5.59     Absolute Lymph # 01/04/2022 2.56     Absolute Mono # 01/04/2022 0.79     Absolute Eos # 01/04/2022 0.32     Basophils Absolute 01/04/2022 0.06     Absolute Immature Granul* 01/04/2022 0.16     WBC Morphology 01/04/2022 NOT REPORTED     RBC Morphology 01/04/2022 NOT REPORTED     Platelet Estimate 18/22/2562 NOT REPORTED     Glucose 01/04/2022 217*    BUN 01/04/2022 54*    CREATININE 01/04/2022 2.32*    Bun/Cre Ratio 01/04/2022 NOT REPORTED     Calcium 01/04/2022 8.9     Sodium 01/04/2022 127*    Potassium 01/04/2022 6.4*    Chloride 01/04/2022 102     CO2 01/04/2022 14*    Anion Gap 01/04/2022 11     Alkaline Phosphatase 01/04/2022 83     ALT 01/04/2022 38     AST 01/04/2022 31     Total Bilirubin 01/04/2022 0.32     Total Protein 01/04/2022 7.2     Albumin 01/04/2022 4.2     Albumin/Globulin Ratio 01/04/2022 1.4     GFR Non- 01/04/2022 29*    GFR  01/04/2022 35*    GFR Comment 01/04/2022          GFR Staging 01/04/2022 NOT REPORTED     Troponin, High Sensitivi* 01/04/2022 59*    Troponin T 01/04/2022 NOT REPORTED     Troponin Interp 01/04/2022 NOT REPORTED     Pro-BNP 01/04/2022 88     BNP Interpretation 01/04/2022 NOT REPORTED     Sodium,Ur 01/04/2022 70     Chloride, Ur 01/04/2022 82     Color, UA 01/04/2022 Yellow     Turbidity UA 01/04/2022 Clear     Glucose, Ur 01/04/2022 TRACE*    Bilirubin Urine 01/04/2022 NEGATIVE     Ketones, Urine 01/04/2022 NEGATIVE     Specific [place], UA 01/04/2022 1.020     Urine Hgb 01/04/2022 NEGATIVE     pH, UA 01/04/2022 5.0     Protein, UA 01/04/2022 2+*    Urobilinogen, Urine 01/04/2022 Normal     Nitrite, Urine 01/04/2022 NEGATIVE     Leukocyte Esterase, Urine 01/04/2022 NEGATIVE     - 01/04/2022          WBC, UA 01/04/2022 None     RBC, UA 01/04/2022 None     Casts UA 01/04/2022 NOT REPORTED     Crystals, UA 01/04/2022 NOT REPORTED     Epithelial Cells UA 01/04/2022 0 TO 2     Renal Epithelial, UA 01/04/2022 NOT REPORTED     Bacteria, UA 01/04/2022 NOT REPORTED     Mucus, UA 01/04/2022 NOT REPORTED     Trichomonas, UA 01/04/2022 NOT REPORTED     Amorphous, UA 01/04/2022 NOT REPORTED     Other Observations UA 01/04/2022 NOT REPORTED     Yeast, UA 01/04/2022 NOT REPORTED     Specimen Description 01/04/2022 . CLEAN CATCH URINE     Special Requests 01/04/2022 NOT REPORTED     Culture 01/04/2022 NO GROWTH     Glucose 01/05/2022 366*    BUN 01/05/2022 45*    CREATININE 01/05/2022 2.13*    Bun/Cre Ratio 01/05/2022 NOT REPORTED     Calcium 01/05/2022 8.3*    Sodium 01/05/2022 132*    Potassium 01/05/2022 5.5*    Chloride 01/05/2022 101     CO2 01/05/2022 20  Anion Gap 01/05/2022 11     GFR Non- 01/05/2022 32*    GFR  01/05/2022 38*    GFR Comment 01/05/2022          GFR Staging 01/05/2022 NOT REPORTED     WBC 01/05/2022 8.9     RBC 01/05/2022 3.52*    Hemoglobin 01/05/2022 10.7*    Hematocrit 01/05/2022 33.2*    MCV 01/05/2022 94.3     MCH 01/05/2022 30.4     MCHC 01/05/2022 32.2     RDW 01/05/2022 14.5*    Platelets 94/85/0376 187     MPV 01/05/2022 10.2     NRBC Automated 01/05/2022 0.0     Protime 01/05/2022 11.2     INR 01/05/2022 1.1     Specimen Source 01/05/2022 . BLOOD     Ordered Test 01/05/2022 CMPX, MG     Reason for Rejection 01/05/2022 Unable to perform testing: Specimen hemolyzed.  - 01/05/2022 NOT REPORTED     Glucose 01/05/2022 169*    BUN 01/05/2022 42*    CREATININE 01/05/2022 2.07*    Bun/Cre Ratio 01/05/2022 NOT REPORTED     Calcium 01/05/2022 8.9     Sodium 01/05/2022 133*    Potassium 01/05/2022 4.8     Chloride 01/05/2022 105     CO2 01/05/2022 15*    Anion Gap 01/05/2022 13     Alkaline Phosphatase 01/05/2022 74     ALT 01/05/2022 36     AST 01/05/2022 28     Total Bilirubin 01/05/2022 0.34     Total Protein 01/05/2022 7.0     Albumin 01/05/2022 3.8     Albumin/Globulin Ratio 01/05/2022 1.2     GFR Non- 01/05/2022 33*    GFR  01/05/2022 40*    GFR Comment 01/05/2022          GFR Staging 01/05/2022 NOT REPORTED     Magnesium 01/05/2022 1.9     Hemoglobin 01/05/2022 10.5*    Hematocrit 01/05/2022 31.3*    Hemoglobin 01/05/2022 11.1*    Hematocrit 01/05/2022 33.4*    Hemoglobin 01/05/2022 10.6*    Hematocrit 01/05/2022 32.1*    POC Glucose 01/05/2022 170*    Hemoglobin 01/06/2022 11.1*    Hematocrit 01/06/2022 33.7*    Surgical Pathology Report 01/05/2022                      Value:-- Diagnosis --  STOMACH, BIOPSIES:  - MODERATE CHRONIC GASTRITIS.  - NEGATIVE FOR HELICOBACTER PYLORI INFECTION AND INTESTINAL  METAPLASIA.     -- Diagnosis Comment --  HELICOBACTER PYLORI IMMUNOSTAIN (CONTROL APPROPRIATE) IS NEGATIVE FOR  INFECTIOUS BACTERIA. Rodrigue Guajardo. Jack Weiss M.D.  **Electronically Signed Out**         sls/1/6/2022       Clinical Information  Pre-op Diagnosis:  MELENA   Operative Findings:  GASTRIC BX LOOK FOR H. PYLORI  Operation Performed:  EGD DIAGNOSTIC ONLY    Source of Specimen  1: GASTRIC BX LOOK FOR H. PYLORI (A)    Gross Description  \"LAW REBOLLEDO, GASTRIC BX\" Four tan-white tissue fragments from 0.3  to 0.5 cm and are 1.0 x 0.4 x 0.1 cm in aggregate. Entirely 1cs. yr  tm      Microscopic Description  Microscopic examination performed. SURGICAL PATHOLOGY CONSULTATION       Patient Name: Clide Glass Med Rec: [id number]  Path Number: [id number]    [address] [place] [address].                             [place], [address]  [phone]  Fax: [phone] POC Glucose 01/05/2022 1315 [facility] Outpatient Visit on 01/03/2022   Component Date Value    D-Dimer, Quant 01/03/2022 0.40     Troponin, High Sensitivi* 01/03/2022 58*    Troponin T 01/03/2022 NOT REPORTED     Troponin Interp 01/03/2022 NOT REPORTED     Glucose 01/03/2022 216*    BUN 01/03/2022 47*    CREATININE 01/03/2022 2.31*    Bun/Cre Ratio 01/03/2022 NOT REPORTED     Calcium 01/03/2022 9.9     Sodium 01/03/2022 135     Potassium 01/03/2022 6.0*    Chloride 01/03/2022 105     CO2 01/03/2022 15*    Anion Gap 01/03/2022 15     GFR Non- 01/03/2022 29*    GFR  01/03/2022 35*    GFR Comment 01/03/2022          GFR Staging 01/03/2022 NOT REPORTED     WBC 01/03/2022 12.5*    RBC 01/03/2022 3.85*    Hemoglobin 01/03/2022 11.6*    Hematocrit 01/03/2022 35.5*    MCV 01/03/2022 92.2     MCH 01/03/2022 30.1     MCHC 01/03/2022 32.7     RDW 01/03/2022 14.2     Platelets 06/90/8866 208     MPV 01/03/2022 11.7     NRBC Automated 01/03/2022 0.0     Differential Type 01/03/2022 NOT REPORTED     Seg Neutrophils 01/03/2022 59     Lymphocytes 01/03/2022 28     Monocytes 01/03/2022 8     Eosinophils % 01/03/2022 3     Basophils 01/03/2022 1     Immature Granulocytes 01/03/2022 1*    Segs Absolute 01/03/2022 7.25     Absolute Lymph # 01/03/2022 3.50     Absolute Mono # 01/03/2022 1.04     Absolute Eos # 01/03/2022 0.41     Basophils Absolute 01/03/2022 0.07     Absolute Immature Granul* 01/03/2022 0.18     WBC Morphology 01/03/2022 NOT REPORTED     RBC Morphology 01/03/2022 NOT REPORTED     Platelet Estimate 74/04/0843 NOT REPORTED    Office Visit on 01/03/2022   Component Date Value    OCCULT BLOOD FECAL 01/03/2022 Positive    Hospital Outpatient Visit on 12/06/2021   Component Date Value    SARS-CoV-2 12/06/2021          Source 12/06/2021 . NASOPHARYNGEAL SWAB     SARS-CoV-2 12/06/2021 DETECTED*       Assessment and Plan      1. Incisional hernia, without obstruction or gangrene  -     XR ABDOMEN (KUB) (SINGLE AP VIEW); Future  2. Paroxysmal atrial fibrillation ([facility] [address]  -     [facility]) Medication Mgmt (Anticoagulation) - [address]  3. Hypertriglyceridemia  -     pravastatin (PRAVACHOL) 20 MG tablet; Take 1 tablet by mouth daily, Disp-30 tablet, R-5Normal       Cannto afford, eliquis, will refer to coumadin clinic  Discussed given only one episode of paroxysmal a fib to verify with cardiologyDr. Ab Flores continued need for anticoagulation  Refill pravastatin  Xray abdomen to r/o obstruction acutely. No results found for this visit on 05/03/22. Return if symptoms worsen or fail to improve. Orders Placed This Encounter   Medications    pravastatin (PRAVACHOL) 20 MG tablet     Sig: Take 1 tablet by mouth daily     Dispense:  30 tablet     Refill:  5        Patient given educational materials - see patient instructions. Discussed use, benefit, and side effects of prescribed medications.   All patientquestions answered. Pt voiced understanding. Patient given educational materials - see patient instructions. Discussed use, benefit, and side effects of prescribed medications. All patientquestions answered. Pt voiced understanding. This note was transcribed using dictation with Dragon services. Efforts were made to correct any errors but some words may be misinterpreted.     Patient assumes risks associated with failure to complete recommended testing and treatments in a timely manner    Electronically signed by TORREY Randolph CNP on 5/3/2022at 4:06 PM

## 2022-05-03 NOTE — TELEPHONE ENCOUNTER
Received new referral for Anticoagulation Service - warfarin from Mirna Moser NP (per notes Cannto afford, eliquis, will refer to coumadin clinic  Discussed given only one episode of paroxysmal a fib to verify with cardiologyDr. Tamia Mckeon continued need for anticoagulation). Called patient and left voicemail to schedule initial appointment.

## 2022-05-28 DIAGNOSIS — E78.1 HYPERTRIGLYCERIDEMIA: ICD-10-CM

## 2022-05-28 RX ORDER — PRAVASTATIN SODIUM 20 MG
TABLET ORAL
Qty: 30 TABLET | Refills: 5 | Status: SHIPPED | OUTPATIENT
Start: 2022-05-28

## 2022-06-02 RX ORDER — CYCLOBENZAPRINE HCL 10 MG
10 TABLET ORAL 2 TIMES DAILY PRN
Qty: 180 TABLET | Refills: 1 | Status: SHIPPED | OUTPATIENT
Start: 2022-06-02 | End: 2022-10-24

## 2022-08-10 NOTE — TELEPHONE ENCOUNTER
Called patient again to follow up, went straight to voicemail.  I left a message asking patient to call back to discuss anticoagulation

## 2022-08-17 NOTE — TELEPHONE ENCOUNTER
LVM with emergency contact - wife Angela Anaya - asking her to have patient call back regarding new referral for warfarin management.

## 2022-09-14 RX ORDER — DILTIAZEM HYDROCHLORIDE 60 MG/1
60 TABLET, FILM COATED ORAL EVERY 12 HOURS
Qty: 180 TABLET | Refills: 3 | Status: SHIPPED | OUTPATIENT
Start: 2022-09-14

## 2022-10-23 DIAGNOSIS — G47.00 INSOMNIA, UNSPECIFIED TYPE: ICD-10-CM

## 2022-10-24 DIAGNOSIS — G47.00 INSOMNIA, UNSPECIFIED TYPE: ICD-10-CM

## 2022-10-24 RX ORDER — CYCLOBENZAPRINE HCL 10 MG
10 TABLET ORAL 2 TIMES DAILY PRN
Qty: 180 TABLET | Refills: 1 | Status: SHIPPED | OUTPATIENT
Start: 2022-10-24

## 2022-10-24 RX ORDER — ZOLPIDEM TARTRATE 12.5 MG/1
12.5 TABLET, FILM COATED, EXTENDED RELEASE ORAL NIGHTLY PRN
Qty: 87 TABLET | OUTPATIENT
Start: 2022-10-24 | End: 2023-01-22

## 2022-10-24 RX ORDER — ZOLPIDEM TARTRATE 12.5 MG/1
12.5 TABLET, FILM COATED, EXTENDED RELEASE ORAL NIGHTLY PRN
Qty: 90 TABLET | Refills: 1 | Status: SHIPPED | OUTPATIENT
Start: 2022-10-24 | End: 2023-01-22

## 2022-11-13 ENCOUNTER — HOSPITAL ENCOUNTER (EMERGENCY)
Age: 62
Discharge: HOME | End: 2022-11-13
Attending: EMERGENCY MEDICINE
Payer: COMMERCIAL

## 2022-11-13 ENCOUNTER — APPOINTMENT (OUTPATIENT)
Dept: GENERAL RADIOLOGY | Age: 62
End: 2022-11-13
Payer: COMMERCIAL

## 2022-11-13 VITALS
SYSTOLIC BLOOD PRESSURE: 139 MMHG | OXYGEN SATURATION: 99 % | DIASTOLIC BLOOD PRESSURE: 102 MMHG | HEART RATE: 85 BPM | TEMPERATURE: 98.1 F | RESPIRATION RATE: 20 BRPM

## 2022-11-13 DIAGNOSIS — S80.01XA HEMATOMA OF RIGHT KNEE REGION: Primary | ICD-10-CM

## 2022-11-13 PROCEDURE — 6370000000 HC RX 637 (ALT 250 FOR IP): Performed by: EMERGENCY MEDICINE

## 2022-11-13 PROCEDURE — 73564 X-RAY EXAM KNEE 4 OR MORE: CPT

## 2022-11-13 PROCEDURE — 99283 EMERGENCY DEPT VISIT LOW MDM: CPT

## 2022-11-13 RX ORDER — RIVAROXABAN 20 MG/1
20 TABLET, FILM COATED ORAL DAILY
COMMUNITY
Start: 2022-08-20

## 2022-11-13 RX ORDER — ACETAMINOPHEN 500 MG
1000 TABLET ORAL ONCE
Status: COMPLETED | OUTPATIENT
Start: 2022-11-13 | End: 2022-11-13

## 2022-11-13 RX ADMIN — ACETAMINOPHEN 1000 MG: 500 TABLET ORAL at 20:01

## 2022-11-13 ASSESSMENT — PAIN DESCRIPTION - ORIENTATION
ORIENTATION: RIGHT
ORIENTATION: RIGHT

## 2022-11-13 ASSESSMENT — PAIN DESCRIPTION - LOCATION
LOCATION: KNEE
LOCATION: KNEE

## 2022-11-13 ASSESSMENT — PAIN SCALES - GENERAL: PAINLEVEL_OUTOF10: 6

## 2022-11-13 ASSESSMENT — ENCOUNTER SYMPTOMS: COLOR CHANGE: 1

## 2022-11-13 ASSESSMENT — PAIN - FUNCTIONAL ASSESSMENT: PAIN_FUNCTIONAL_ASSESSMENT: 0-10

## 2022-11-13 NOTE — Clinical Note
Gregory Kaur was seen and treated in our emergency department on 11/13/2022. He may return to work on 11/16/2022. If you have any questions or concerns, please don't hesitate to call.       Trever Evans MD

## 2022-11-14 NOTE — ED PROVIDER NOTES
[facility]  ED  Emergency Department Encounter  EmergencyMedicineResident       Pt Name: Donis Holstein  MRN: [id number]  Manasgfelizabeth 1960  Date of evaluation: 11/13/22  PCP: TORREY Sanchez CNP    CHIEF COMPLAINT       Chief Complaint   Patient presents with    Knee Injury     Mechanical fall x2 onto right knee last week while on vacation. Tylenol at 1030 AM        HISTORY OF PRESENT ILLNESS  (Location/Symptom, Timing/Onset, Context/Setting, Quality, Duration, Modifying Factors, Severity.)      Donis Holstein is a 57 y.o. male who presents evaluation today of right knee pain. He was in [facility]. He fell and on his right knee twice in the matter of 2 minutes. He has had pain and swelling since that time for just over a week. He is on Xarelto. He denies any head trauma or neck trauma. Patient is on Xarelto. He has been taking Tylenol for relief with minimal.  He is concerned whether not he can go back to work tomorrow. PAST MEDICAL / SURGICAL /SOCIAL / FAMILY HISTORY      has a past medical history of Arthritis, Back pain, Colostomy RUQ, Frequent headaches, Gout, History of atrial fibrillation, History of blood transfusion, [place] HTN, Hyperkalemia, Hyperlipidemia, Incisional hernia, Kidney infection, Sepsis ([address] Sleep apnea, T2DM, Tooth missing, Under care of team, Wears glasses, and Wellness examination. has a past surgical history that includes Ankle surgery (Right, 2010); Carpal tunnel release (Bilateral); Cystoscopy (Right, 09/01/2020);  cath power picc triple (09/03/2020); Kidney surgery (Left, 09/02/2020); Small intestine surgery (N/A, 09/02/2020); laparotomy (N/A, 09/23/2020); Tonsillectomy; Knee arthroscopy (Bilateral); Adenoidectomy; cysto/uretero/pyeloscopy, calculus tx (Right, 10/30/2020); colostomy; Small intestine surgery (N/A, 12/18/2020);  Tympanostomy tube placement; and Upper gastrointestinal endoscopy (N/A, 1/5/2022). Social History     Socioeconomic History    Marital status:      Spouse name: Not on file    Number of children: Not on file    Years of education: Not on file    Highest education level: Not on file   Occupational History    Not on file   Tobacco Use    Smoking status: Never    Smokeless tobacco: Former     Types: Chew     Quit date: 1980   Vaping Use    Vaping Use: Never used   Substance and Sexual Activity    Alcohol use: Yes     Alcohol/week: 2.0 standard drinks     Types: 2 Cans of beer per week     Comment: socially     Drug use: Yes     Types: Marijuana [place])     Comment: edibles    Sexual activity: Not Currently   Other Topics Concern    Not on file   Social History Narrative    Not on file     Social Determinants of Health     Financial Resource Strain: Low Risk     Difficulty of Paying Living Expenses: Not hard at all   Food Insecurity: No Food Insecurity    Worried About Running Out of Food in the Last Year: Never true    Ran Out of Food in the Last Year: Never true   Transportation Needs: Not on file   Physical Activity: Not on file   Stress: Not on file   Social Connections: Not on file   Intimate Partner Violence: Not on file   Housing Stability: Not on file       Family History   Problem Relation Age of Onset    Stroke Maternal Grandmother     Stroke Maternal Grandfather     Other Mother         AORTIC BYPASS LED TO KIDNEY FAILURE    Kidney Disease Mother     Diabetes Father     Stroke Father        Allergies:  Ampicillin, Pcn [penicillins], Sulfa antibiotics, and Tape [adhesive tape]    Home Medications:  Prior to Admission medications    Medication Sig Start Date End Date Taking? Authorizing Provider   XARELTO 20 MG TABS tablet Take 20 mg by mouth daily 8/20/22   Historical Provider, MD   zolpidem (AMBIEN CR) 12.5 MG extended release tablet Take 1 tablet by mouth nightly as needed for Sleep for up to 90 days.  10/24/22 1/22/23  Lon Castleman, APRN - CNP   cyclobenzaprine (FLEXERIL) 10 MG tablet TAKE 1 TABLET BY MOUTH 2 TIMES DAILY AS NEEDED FOR MUSCLE SPASMS. 10/24/22   Ewelinakaylin HintonTORREY CNP   dilTIAZem (CARDIZEM) 60 MG tablet Take 1 tablet by mouth in the morning and 1 tablet in the evening.  9/14/22   Ewelinakaylin HintonTORREY CNP   pravastatin (PRAVACHOL) 20 MG tablet TAKE 1 TABLET BY MOUTH EVERY DAY 5/28/22   TORREY Hylton CNP   DULoxetine (CYMBALTA) 20 MG extended release capsule Take 20 mg by mouth daily    Historical Provider, MD   sodium bicarbonate 650 MG tablet Take 650 mg by mouth 4 times daily    Historical Provider, MD   allopurinol (ZYLOPRIM) 100 MG tablet TAKE 1 TABLET BY MOUTH EVERY DAY 3/16/22   TORREY Espinal CNP   simethicone (MYLICON) 80 MG chewable tablet Take 1 tablet by mouth 4 times daily as needed for Flatulence 1/21/22   Clemencia Arias MD   hydrOXYzine (ATARAX) 50 MG tablet TAKE 1 TABLET BY MOUTH EVERY DAY AT NIGHT 1/11/22   TORREY Espinal CNP   pantoprazole (PROTONIX) 40 MG tablet Take 1 tablet by mouth 2 times daily (before meals) 1/6/22   Hever DELEON Blood, DO   sodium zirconium cyclosilicate (LOKELMA) 10 g PACK oral suspension Take 10 g by mouth daily    Historical Provider, MD   metoprolol (LOPRESSOR) 100 MG tablet Take 100 mg by mouth 2 times daily  11/3/21   Historical Provider, MD   blood glucose test strips (TRUE METRIX BLOOD GLUCOSE TEST) strip PLEASE SEE ATTACHED FOR DETAILED DIRECTIONS 1/25/21   Ewelinakaylin HintonTORREY CNP   ferrous sulfate (IRON 325) 325 (65 Fe) MG tablet Take 1 tablet by mouth daily (with breakfast) 12/28/20   TORREY Espinal CNP   apixaban (ELIQUIS) 5 MG TABS tablet Take 1 tablet by mouth 2 times daily  Patient not taking: No sig reported 12/2/20   TORREY Espinal CNP   CPAP Machine MISC use as directed    Historical Provider, MD   Respiratory Therapy Supplies (CARETOUCH CPAP & BIPAP HOSE) MISC Mask and Tubing 1/28/20   Historical Provider, MD   Melatonin 10 MG TABS Take 1 tablet by mouth nightly as needed    Historical Provider, MD   Multiple Vitamins-Minerals (THERAPEUTIC MULTIVITAMIN-MINERALS) tablet Take 1 tablet by mouth daily 10/3/20   Lorrie Tariq MD   vitamin C (ASCORBIC ACID) 500 MG tablet Take 500 mg by mouth daily    Historical Provider, MD   acetaminophen (TYLENOL) 500 MG tablet Take 2 tablets by mouth every 6 hours as needed for Pain 8/11/20 12/21/21  Trey Jaimes MD   aspirin 81 MG tablet Take 81 mg by mouth nightly Stopped 12/14/2020    Historical Provider, MD       REVIEW OF SYSTEMS    (2-9 systems for level 4, 10 or more forlevel 5)      Review of Systems   Constitutional:  Negative for chills and fever. Musculoskeletal:         Injury, fall, right knee injury, right knee pain, right knee swelling   Skin:  Positive for color change. Negative for rash and wound. Neurological:  Negative for weakness and numbness. PHYSICAL EXAM   (up to 7 for level 4, 8 or more forlevel 5)      ED TRIAGE VITALS BP: (!) 139/102, Temp: 98.1 °F (36.7 °C), Heart Rate: 85, Resp: 20, SpO2: 99 %    Vitals:    11/13/22 1929 11/13/22 1934   BP: (!) 139/102    Pulse: 85    Resp: 20    Temp:  98.1 °F (36.7 °C)   TempSrc:  Oral   SpO2: 99%          Physical Exam  Vitals and nursing note reviewed. Constitutional:       General: He is not in acute distress. Appearance: Normal appearance. He is obese. He is not ill-appearing, toxic-appearing or diaphoretic. HENT:      Head: Normocephalic and atraumatic. Nose: Nose normal.      Mouth/Throat:      Mouth: Mucous membranes are moist.   Eyes:      Extraocular Movements: Extraocular movements intact. Pupils: Pupils are equal, round, and reactive to light. Cardiovascular:      Rate and Rhythm: Normal rate and regular rhythm. Pulses: Normal pulses. Heart sounds: Normal heart sounds. Pulmonary:      Effort: Pulmonary effort is normal.      Breath sounds: Normal breath sounds.    Abdominal:      General: Abdomen is flat.      Palpations: Abdomen is soft. Musculoskeletal:         General: Swelling, tenderness and signs of injury present. No deformity. Cervical back: Normal range of motion. Comments: Distal pulses intact, right knee exam did reveal an effusion, there is no joint laxity, tenderness primarily with medial knee stress   Skin:     General: Skin is warm and dry. Capillary Refill: Capillary refill takes less than 2 seconds. Neurological:      General: No focal deficit present. Mental Status: He is alert and oriented to person, place, and time. Psychiatric:         Mood and Affect: Mood normal.         Behavior: Behavior normal.         DIFFERENTIAL  DIAGNOSIS     PLAN (LABS / IMAGING / EKG):  Orders Placed This Encounter   Procedures    XR KNEE RIGHT (MIN 4 VIEWS)       MEDICATIONS ORDERED:  ED Medication Orders (From admission, onward)      Start Ordered     Status Ordering Provider    11/13/22 1945 11/13/22 1940  acetaminophen (TYLENOL) tablet 1,000 mg  ONCE         Last MAR action: Given - by Rolando White on 11/13/22 at [address] [place] [address], [address] [place] [address] / [address] [place] [address] / [facility]     IMPRESSION & INITIAL PLAN:  This is a 26-year-old male presenting today for evaluation of a right knee injury. He fell approximately 1 week ago on vacation. Landed on his right knee. He then fell just minutes later landing on the same knee. He is on Xarelto. Clinically the patient presents today with right knee swelling. He is able to walk. X-rays did not reveal fracture. Given his history and the use of Xarelto there is a concern for hemarthrosis. Patient pain level is a 6 out of 10. Currently being controlled with Tylenol. Will discharge patient home with rice therapy and Tylenol. I did give him a orthopedic surgery consult in case his right knee pain and swelling did not resolve.   He does have baseline degenerative changes in his right knee and would benefit from future orthopedic surgery consultation. LABS:  No results found for this visit on 11/13/22. RADIOLOGY:  XR KNEE RIGHT (MIN 4 VIEWS)   Final Result   1. No fracture demonstrated. 2. Mild-to-moderate severity tricompartment osteoarthritis. 3. Probable joint effusion. Follow-up or additional imaging should be considered if pain persists or   worsens. CONSULTS:  None    CRITICAL CARE:  See attending physician note    FINAL IMPRESSION      1.  Hematoma of right knee region          DISPOSITION / PLAN     DISPOSITION Decision To Discharge 11/13/2022 09:28:57 PM      PATIENT REFERRED TO:  Susana Claros DO  [address]    Schedule an appointment as soon as possible for a visit         DISCHARGE MEDICATIONS:  New Prescriptions    No medications on file     Modified Medications    No medications on file        Shonda Ponce MD  Emergency Medicine Resident    (Please note that portions of this note were completed with a voice recognition program.  Efforts were made to edit the dictations but occasionally words are mis-transcribed.)       Shonda Ponce MD  Resident  11/13/22 [id number]

## 2022-11-14 NOTE — ED PROVIDER NOTES
[facility] ED     Emergency Department     Faculty Attestation    I performed a history and physical examination of the patient and discussed management with the resident. I reviewed the residents note and agree with the documented findings and plan of care. Any areas of disagreement are noted on the chart. I was personally present for the key portions of any procedures. I have documented in the chart those procedures where I was not present during the key portions. I have reviewed the emergency nurses triage note. I agree with the chief complaint, past medical history, past surgical history, allergies, medications, social and family history as documented unless otherwise noted below. For Physician Assistant/ Nurse Practitioner cases/documentation I have personally evaluated this patient and have completed at least one if not all key elements of the E/M (history, physical exam, and MDM). Additional findings are as noted. Patient with right knee pain following vacation last week twice slipping on a wet water on the tile. Landing on his knee both times. He is on Xarelto. No head injury no loss of consciousness. Was able to finish for vacation got back last night and thus presents today. Still ambulatory despite pain. On exam mild effusion of the right knee no deformity does have patellar tenderness. Distally intact strong pulses. Extensor mechanism intact 5-5 strength knee flexion extension against resistance.   Will image, plan discharge      Critical Care     none    May Calles MD, Isabell Estimgunjan  Attending Emergency  Physician           May Calles MD  11/13/22 2019

## 2022-11-14 NOTE — ED NOTES
Patient walk in c/o right knee pain after two mechanical falls last week. Patient states he was on vacation and walking on a slippery pool deck when he slipped and fell x2 directly onto right knee. Patient denies head injury or LOC. States he also hit his right elbow when he fell but that he is not experiencing pain in elbow. Has not taken anything for pain since 1030 AM this morning. Patient is on daily xarelto. On arrival, patient ambulatory with slight limp, A+Ox4, respirations even and unlabored, speaking in complete sentences.       Jocy Prieto RN  11/13/22 1940

## 2022-11-14 NOTE — ED NOTES
Patient's right knee wrapped in ace wrap. Demonstrated correct way to wrap knee and provided patient with extra ace wrap for home. Patient verbalized understanding.       Tim Ruiz RN  11/13/22 0314

## 2022-11-15 ENCOUNTER — OFFICE VISIT (OUTPATIENT)
Dept: FAMILY MEDICINE CLINIC | Age: 62
End: 2022-11-15
Payer: COMMERCIAL

## 2022-11-15 VITALS
DIASTOLIC BLOOD PRESSURE: 66 MMHG | TEMPERATURE: 98 F | OXYGEN SATURATION: 98 % | SYSTOLIC BLOOD PRESSURE: 128 MMHG | WEIGHT: 279 LBS | BODY MASS INDEX: 39.06 KG/M2 | HEART RATE: 86 BPM | HEIGHT: 71 IN

## 2022-11-15 DIAGNOSIS — E78.1 HYPERTRIGLYCERIDEMIA: ICD-10-CM

## 2022-11-15 DIAGNOSIS — M25.561 ACUTE PAIN OF RIGHT KNEE: Primary | ICD-10-CM

## 2022-11-15 DIAGNOSIS — Z00.00 ROUTINE GENERAL MEDICAL EXAMINATION AT A HEALTH CARE FACILITY: ICD-10-CM

## 2022-11-15 DIAGNOSIS — I10 ESSENTIAL HYPERTENSION: ICD-10-CM

## 2022-11-15 DIAGNOSIS — E11.9 TYPE 2 DIABETES MELLITUS WITHOUT COMPLICATION, UNSPECIFIED WHETHER LONG TERM INSULIN USE (HCC): ICD-10-CM

## 2022-11-15 DIAGNOSIS — K43.2 INCISIONAL HERNIA, WITHOUT OBSTRUCTION OR GANGRENE: Chronic | ICD-10-CM

## 2022-11-15 DIAGNOSIS — Z12.11 SCREEN FOR COLON CANCER: ICD-10-CM

## 2022-11-15 LAB — HBA1C MFR BLD: 10.5 %

## 2022-11-15 PROCEDURE — 83036 HEMOGLOBIN GLYCOSYLATED A1C: CPT | Performed by: NURSE PRACTITIONER

## 2022-11-15 PROCEDURE — 3078F DIAST BP <80 MM HG: CPT | Performed by: NURSE PRACTITIONER

## 2022-11-15 PROCEDURE — 2022F DILAT RTA XM EVC RTNOPTHY: CPT | Performed by: NURSE PRACTITIONER

## 2022-11-15 PROCEDURE — 3046F HEMOGLOBIN A1C LEVEL >9.0%: CPT | Performed by: NURSE PRACTITIONER

## 2022-11-15 PROCEDURE — 1036F TOBACCO NON-USER: CPT | Performed by: NURSE PRACTITIONER

## 2022-11-15 PROCEDURE — 3017F COLORECTAL CA SCREEN DOC REV: CPT | Performed by: NURSE PRACTITIONER

## 2022-11-15 PROCEDURE — 99214 OFFICE O/P EST MOD 30 MIN: CPT | Performed by: NURSE PRACTITIONER

## 2022-11-15 PROCEDURE — 3074F SYST BP LT 130 MM HG: CPT | Performed by: NURSE PRACTITIONER

## 2022-11-15 PROCEDURE — G8484 FLU IMMUNIZE NO ADMIN: HCPCS | Performed by: NURSE PRACTITIONER

## 2022-11-15 PROCEDURE — G8417 CALC BMI ABV UP PARAM F/U: HCPCS | Performed by: NURSE PRACTITIONER

## 2022-11-15 PROCEDURE — G8427 DOCREV CUR MEDS BY ELIG CLIN: HCPCS | Performed by: NURSE PRACTITIONER

## 2022-11-15 RX ORDER — MULTIVIT-MIN/IRON/FOLIC ACID/K 18-600-40
CAPSULE ORAL
COMMUNITY

## 2022-11-15 RX ORDER — BLOOD-GLUCOSE METER
1 EACH MISCELLANEOUS 2 TIMES DAILY
Qty: 1 KIT | Refills: 0 | Status: SHIPPED | OUTPATIENT
Start: 2022-11-15

## 2022-11-15 RX ORDER — BLOOD SUGAR DIAGNOSTIC
1 STRIP MISCELLANEOUS 2 TIMES DAILY
Qty: 300 EACH | Refills: 3 | Status: SHIPPED | OUTPATIENT
Start: 2022-11-15

## 2022-11-15 RX ORDER — DEXAMETHASONE 0.5 MG/1
1 TABLET ORAL ONCE
Qty: 2 TABLET | Refills: 0 | Status: SHIPPED | OUTPATIENT
Start: 2022-11-15 | End: 2022-11-15

## 2022-11-15 ASSESSMENT — ENCOUNTER SYMPTOMS
RECTAL PAIN: 0
COLOR CHANGE: 0
SORE THROAT: 0
EYE PAIN: 0
DIARRHEA: 0
RHINORRHEA: 0
CONSTIPATION: 0
SHORTNESS OF BREATH: 0
COUGH: 0
ABDOMINAL PAIN: 1
CHEST TIGHTNESS: 0
NAUSEA: 0

## 2022-11-15 NOTE — PROGRESS NOTES
Visit Information    Have you changed or started any medications since your last visit including any over-the-counter medicines, vitamins, or herbal medicines? no   Have you stopped taking any of your medications? Is so, why? -  no  Are you having any side effects from any of your medications? - no    Have you seen any other physician or provider since your last visit?  no   Have you had any other diagnostic tests since your last visit?  no   Have you been seen in the emergency room and/or had an admission in a hospital since we last saw you?  no   Have you had your routine dental cleaning in the past 6 months?  no     Do you have an active MyChart account? If no, what is the barrier?   Yes    Patient Care Team:  TORREY Case CNP as PCP - General (Family Nurse Practitioner)  KolbyTORREY Jon CNP as PCP - [facility] Provider  Roshan Lee MD as Consulting Physician (Gastroenterology)    Medical History Review  Past Medical, Family, and Social History reviewed and  contribute to the patient presenting condition    Health Maintenance   Topic Date Due    COVID-19 Vaccine (1) Never done    Diabetic retinal exam  Never done    DTaP/Tdap/Td vaccine (1 - Tdap) Never done    Colorectal Cancer Screen  Never done    Shingles vaccine (1 of 2) Never done    Diabetic foot exam  02/01/2022    A1C test (Diabetic or Prediabetic)  02/01/2022    Flu vaccine (1) Never done    Diabetic microalbuminuria test  08/06/2022    Lipids  08/06/2022    Depression Screen  05/03/2023    Pneumococcal 0-64 years Vaccine  Completed    Hepatitis C screen  Completed    HIV screen  Completed    Hepatitis A vaccine  Aged Out    Hib vaccine  Aged Out    Meningococcal (ACWY) vaccine  Aged Out

## 2022-11-15 NOTE — PROGRESS NOTES
[address]  [place], [address]  [phone]      Mulu Lindo II is a 57 y.o. male who presents today for his  medicalconditions/complaints as noted below. Mulu Lindo II is c/o of Diabetes and Knee Injury (Last Wed, on vacation, stepped out of shower and fell from water on floor, second time was right after that because air conditioner was leaking water. Went to ER 09/24, knee becoming more sore)  . HPI:    HPI  Patient fell on right knee 6 days ago, slipped on water on bathroom floor while in [facility]. He denies medical treatment while there, went to [facility] ER on 11/13/22. X ray of knee negative for fracture; osteoarthritis & effusions present. HE has been taking Tylenol, using a compression wrap, and ice. Trying to decrease activity due to pain. He required 1 dose of Flexeril this morning due to increase walking yesterday. Has appt with ortho next week for f/u. Unable to take nsaids d/t having 1 kidney,. He reports not taking any diabetic medications, mild modifications to diet & exercise, feels he has lost some weight recently. not eating sweets. He has had an elevated potassium that has postponed his hernia surgery. Once normal, surgery will we scheduled. Past Medical History:   Diagnosis Date    Arthritis     BILATERAL KNEESand right shoulder    Back pain     Colostomy RUQ 09/23/2020    Frequent headaches     10/28/2020 PATIENT STATES EVERY OTHER DAY. Gout     History of atrial fibrillation     AFTER SURGERY ON 09/23/2020 WENT INTO A FIB. CONVERTED BACK TO NORMAL RHYTHM ON HIS OWN. CARDIOLOGIST DR Jenny West    History of blood transfusion     NO REACTION    [place] (hard of hearing)     HTN     Moni Goel CNP    Hyperkalemia     Hyperlipidemia     Incisional hernia     Kidney infection     Sepsis ([address]     post surgery. leak in bowel    Sleep apnea     USES CPAP MACHINE    T2DM     K. Sherryle Greenland, CNP.  diet controlled    Tooth missing     RIGHT UPPER 2ND MOLAR    Under care of team 08/2021    Dr. Stephon Motley    Wears glasses     READING    Wellness examination 07/2021    Vikas Oakley CNP [facility]       Past Surgical History:   Procedure Laterality Date    ADENOIDECTOMY      TWICE AS A CHILD    ANKLE SURGERY Right 2010    RUPTURED ACHILES    CARPAL TUNNEL RELEASE Bilateral     COLOSTOMY      temporary    CYSTO/URETERO/PYELOSCOPY, CALCULUS TX Right 10/30/2020    HOLMIUM-STANDBY, CYSTOSCOPY, URETEROSCOPY, STENT EXCHANGE performed by Kelsey Salgado MD at [address] Right 09/01/2020    CYSTOSCOPY RIGHT URETERAL STENT INSERTION performed by Hi Solano MD at [address] SHEN POWER PICC TRIPLE  09/03/2020    REMOVED AROUND 10/07/2020    KIDNEY SURGERY Left 09/02/2020    LAPAROSCOPIC XI ROBOTIC NEPHRECTOMY performed by Mehreen Jean-Baptiste MD at [address] ARTHROSCOPY Bilateral     LAPAROTOMY N/A 09/23/2020    LAPAROTOMY EXPLORATORY performed by Patrice Gaffney DO at [address] 09/02/2020    EXPLORATORY LAPAROTOMY, RESECTION OF PROXIMAL JEJUNUM AND DESCENDING COLON WITH MOBILIZATION OF SPLENIC FLEXURE AND PRIMARY ANASTAMOSISOF SMALL BOWEL AND COLON, PLACEMENT OF GASTROSTOMY TUBE performed by Patrice Gaffney DO at [address] 12/18/2020    EXPLORATORY LAPAROTOMY,COLOSTOMY REVERSAL, TAP BLOCK DONE IN OR BY DR Chapis Cristobal performed by Patrice Gaffney DO at [address] 3 TIMES  AS A CHILD    TYMPANOSTOMY TUBE PLACEMENT      UPPER GASTROINTESTINAL ENDOSCOPY N/A 1/5/2022    EGD BIOPSY performed by Donna Fernando MD at [facility]     Family History   Problem Relation Age of Onset    Stroke Maternal Grandmother     Stroke Maternal Grandfather     Other Mother         AORTIC BYPASS LED TO KIDNEY FAILURE    Kidney Disease Mother     Diabetes Father     Stroke Father      Social History     Tobacco Use    Smoking status: Never    Smokeless tobacco: Former Types: [employer]     Quit date: 1980   Substance Use Topics    Alcohol use: Yes     Alcohol/week: 2.0 standard drinks     Types: 2 Cans of beer per week     Comment: socially       Current Outpatient Medications   Medication Sig Dispense Refill    Cholecalciferol (VITAMIN D) 50 MCG (2000 UT) CAPS capsule Take by mouth 25,000 monthly      blood glucose test strips (GLUCOSE METER TEST) strip 1 each by In Vitro route 2 times daily As needed. 300 each 3    Blood Glucose Monitoring Suppl ([employer] BLOOD GLUCOSE METER) w/Device KIT 1 each by Does not apply route in the morning and at bedtime 1 kit 0    dexamethasone (DECADRON) 0.5 MG tablet Take 2 tablets by mouth once for 1 dose At 11 pm night before blood draw 2 tablet 0    XARELTO 20 MG TABS tablet Take 20 mg by mouth daily      zolpidem (AMBIEN CR) 12.5 MG extended release tablet Take 1 tablet by mouth nightly as needed for Sleep for up to 90 days. 90 tablet 1    cyclobenzaprine (FLEXERIL) 10 MG tablet TAKE 1 TABLET BY MOUTH 2 TIMES DAILY AS NEEDED FOR MUSCLE SPASMS. 180 tablet 1    dilTIAZem (CARDIZEM) 60 MG tablet Take 1 tablet by mouth in the morning and 1 tablet in the evening.  180 tablet 3    pravastatin (PRAVACHOL) 20 MG tablet TAKE 1 TABLET BY MOUTH EVERY DAY 30 tablet 5    DULoxetine (CYMBALTA) 20 MG extended release capsule Take 20 mg by mouth daily      allopurinol (ZYLOPRIM) 100 MG tablet TAKE 1 TABLET BY MOUTH EVERY DAY 90 tablet 3    hydrOXYzine (ATARAX) 50 MG tablet TAKE 1 TABLET BY MOUTH EVERY DAY AT NIGHT 90 tablet 3    pantoprazole (PROTONIX) 40 MG tablet Take 1 tablet by mouth 2 times daily (before meals) 60 tablet 1    metoprolol (LOPRESSOR) 100 MG tablet Take 100 mg by mouth 2 times daily       ferrous sulfate (IRON 325) 325 (65 Fe) MG tablet Take 1 tablet by mouth daily (with breakfast) 180 tablet 1    Melatonin 10 MG TABS Take 1 tablet by mouth nightly as needed      Multiple Vitamins-Minerals (THERAPEUTIC MULTIVITAMIN-MINERALS) tablet Take 1 tablet by mouth daily 90 tablet 1    aspirin 81 MG tablet Take 81 mg by mouth nightly Stopped 12/14/2020      sodium bicarbonate 650 MG tablet Take 650 mg by mouth 4 times daily      simethicone (MYLICON) 80 MG chewable tablet Take 1 tablet by mouth 4 times daily as needed for Flatulence 180 tablet 3    sodium zirconium cyclosilicate (LOKELMA) 10 g PACK oral suspension Take 10 g by mouth daily      CPAP Machine MISC use as directed      Respiratory Therapy Supplies (CARETOUCH CPAP & BIPAP HOSE) MISC Mask and Tubing      vitamin C (ASCORBIC ACID) 500 MG tablet Take 500 mg by mouth daily      acetaminophen (TYLENOL) 500 MG tablet Take 2 tablets by mouth every 6 hours as needed for Pain 40 tablet 0     No current facility-administered medications for this visit. Allergies   Allergen Reactions    Ampicillin Swelling     Swelling of throat. Pcn [Penicillins] Swelling     Throat swelling. Tolerated cefepime during 8/31/20 admission. Sulfa Antibiotics      Other reaction(s): Unknown    Tape [Adhesive Tape] Other (See Comments)     CAUSES REDNESS. PAPER TAPE OK. Health Maintenance   Topic Date Due    COVID-19 Vaccine (1) Never done    Diabetic retinal exam  Never done    DTaP/Tdap/Td vaccine (1 - Tdap) Never done    Colorectal Cancer Screen  Never done    Shingles vaccine (1 of 2) Never done    Diabetic foot exam  02/01/2022    Flu vaccine (1) Never done    Diabetic microalbuminuria test  08/06/2022    Lipids  08/06/2022    A1C test (Diabetic or Prediabetic)  02/15/2023    Depression Screen  05/03/2023    Pneumococcal 0-64 years Vaccine  Completed    Hepatitis C screen  Completed    HIV screen  Completed    Hepatitis A vaccine  Aged Out    Hib vaccine  Aged Out    Meningococcal (ACWY) vaccine  Aged Out       Subjective:      Review of Systems   Constitutional:  Negative for activity change, chills, fatigue, fever and unexpected weight change.    HENT:  Negative for congestion, ear pain, postnasal drip, rhinorrhea and sore throat. Eyes:  Negative for pain and visual disturbance. Respiratory:  Negative for cough, chest tightness and shortness of breath. Cardiovascular:  Negative for chest pain, palpitations and leg swelling. Gastrointestinal:  Positive for abdominal pain. Negative for constipation, diarrhea, nausea and rectal pain. Endocrine: Negative for polydipsia, polyphagia and polyuria. Genitourinary:  Negative for difficulty urinating, dysuria, hematuria, penile pain, penile swelling, scrotal swelling and testicular pain. Musculoskeletal:  Positive for arthralgias and joint swelling. Right knee   Skin:  Negative for color change, rash and wound. Neurological:  Negative for weakness, numbness and headaches. Hematological:  Bruises/bleeds easily. Psychiatric/Behavioral:  Negative for dysphoric mood. The patient is not nervous/anxious. Objective:      Physical Exam  Vitals and nursing note reviewed. Constitutional:       General: He is not in acute distress. Appearance: Normal appearance. He is well-developed. He is not diaphoretic. Comments: /66 (Site: Right Upper Arm, Position: Sitting, Cuff Size: Large Adult)   Pulse 86   Temp 98 °F (36.7 °C) (Tympanic)   Ht 5' 11\" (1.803 m)   Wt 279 lb (126.6 kg)   SpO2 98%   BMI 38.91 kg/m²      HENT:      Head: Normocephalic and atraumatic. Right Ear: Hearing, tympanic membrane, ear canal and external ear normal.      Left Ear: Hearing, tympanic membrane, ear canal and external ear normal.      Nose: Nose normal.      Mouth/Throat:      Pharynx: No oropharyngeal exudate. Eyes:      Conjunctiva/sclera: Conjunctivae normal.      Pupils: Pupils are equal, round, and reactive to light. Neck:      Thyroid: No thyromegaly. Vascular: No JVD. Cardiovascular:      Rate and Rhythm: Normal rate and regular rhythm. Heart sounds: Normal heart sounds. No murmur heard.      Comments: NO LE edema  Pulmonary:      Effort: Pulmonary effort is normal. No respiratory distress. Breath sounds: Normal breath sounds. No wheezing. Abdominal:      General: Abdomen is protuberant. Bowel sounds are normal.      Palpations: Abdomen is soft. There is no mass (Hernia lower right abdomen to umbilicus) or pulsatile mass. Tenderness: There is abdominal tenderness in the right lower quadrant. Hernia: A hernia is present. Musculoskeletal:      Cervical back: Normal range of motion and neck supple. Right knee: Swelling present. No erythema, ecchymosis, lacerations or crepitus. Decreased range of motion. Tenderness present. Normal alignment. Normal pulse. Left knee: Normal.   Lymphadenopathy:      Cervical: No cervical adenopathy. Skin:     General: Skin is warm and dry. Capillary Refill: Capillary refill takes less than 2 seconds. Findings: No rash. Neurological:      General: No focal deficit present. Mental Status: He is alert and oriented to person, place, and time. Mental status is at baseline. Motor: No abnormal muscle tone. Coordination: Coordination normal.   Psychiatric:         Mood and Affect: Mood normal.         Behavior: Behavior normal.         Thought Content: Thought content normal.         Judgment: Judgment normal.       Assessment:       Diagnosis Orders   1. Acute pain of right knee        2. Hypertriglyceridemia  Lipid Panel      3. Type 2 diabetes mellitus without complication, unspecified whether long term insulin use (HCC)  Microalbumin, Ur    POCT glycosylated hemoglobin (Hb A1C)    blood glucose test strips (GLUCOSE METER TEST) strip    Blood Glucose Monitoring Suppl ([employer] BLOOD GLUCOSE METER) w/Device KIT    Cortisol Total    Dexamethasone    DHEA-Sulfate      4. Incisional hernia, without obstruction or gangrene        5. Essential hypertension        6. Screen for colon cancer  Fecal DNA Colorectal cancer screening (Cologuard)      7.  Routine general medical examination at a health care facility  Lipid Panel    CBC with Auto Differential    Comprehensive Metabolic Panel        Narrative   EXAMINATION:   FOUR XRAY VIEWS OF THE RIGHT KNEE       11/13/2022 8:26 pm       COMPARISON:   None. HISTORY:   ORDERING SYSTEM PROVIDED HISTORY: fall, landed on knee       FINDINGS:   Osseous structures of the knee are intact and align normally. Mild-to-moderate severity tricompartment osteoarthritis, lateral and   patellofemoral compartments appearing more severely affected. No   osteochondral defect is evident. No retained radiopaque foreign body. Increased density suprapatellar region is evidence of joint effusion. Impression   1. No fracture demonstrated. 2. Mild-to-moderate severity tricompartment osteoarthritis. 3. Probable joint effusion. Follow-up or additional imaging should be considered if pain persists or   worsens. Results for orders placed or performed in visit on 11/15/22   POCT glycosylated hemoglobin (Hb A1C)   Result Value Ref Range    Hemoglobin A1C 10.5 %       Chemistry        Component Value Date/Time     (L) 01/05/2022 0853    K 4.8 01/05/2022 0853     01/05/2022 0853    CO2 15 (L) 01/05/2022 0853    BUN 42 (H) 01/05/2022 0853    CREATININE 2.07 (H) 01/05/2022 0853        Component Value Date/Time    CALCIUM 8.9 01/05/2022 0853    ALKPHOS 74 01/05/2022 0853    AST 28 01/05/2022 0853    ALT 36 01/05/2022 0853    BILITOT 0.34 01/05/2022 0853          Plan:   Knee Pain: Management per ortho. Appt 11/22. Continue RICE & Tylenol. Ok for voltaren gel QID    Hernia: Management per GI. Awaiting Potassium to decrease to proceed with repair. Type 2 DM: Unstable again. A1C 10.5, increased from 5.3 in 02/2021. Will check Dexamethasone, DHEA, Cortisol, microalbumin. Reviewed diabetic diet & exercise. Declines meds for now and f/u 1 month with BG meter to review results.  HO given for meal planning/carb counting    Hyperlipidemia: Check lipid panel. Instructed on low fat, low sodium, high protein diet. HTN: Continue present meds. Healthy healthy. Low fat diet. Order Cologuard for Colon CA screening. Check Labs today. Return in about 1 month (around 12/15/2022) for return for diabetes management. Orders Placed This Encounter   Procedures    Fecal DNA Colorectal cancer screening (Cologuard)    Lipid Panel     Standing Status:   Future     Standing Expiration Date:   11/15/2023     Order Specific Question:   Is Patient Fasting?/# of Hours     Answer:   8     Order Specific Question:   Has the patient fasted? Answer:   Yes    CBC with Auto Differential     Standing Status:   Future     Standing Expiration Date:   11/15/2023    Comprehensive Metabolic Panel     Standing Status:   Future     Standing Expiration Date:   11/15/2023    Microalbumin, Ur     Standing Status:   Future     Standing Expiration Date:   11/15/2023    Cortisol Total     Standing Status:   Future     Standing Expiration Date:   2023     Order Specific Question:   8AM or 4PM?     Answer:   8 am    Dexamethasone     Standing Status:   Future     Standing Expiration Date:   11/15/2023    DHEA-Sulfate     Standing Status:   Future     Standing Expiration Date:   11/15/2023    POCT glycosylated hemoglobin (Hb A1C)     Orders Placed This Encounter   Medications    blood glucose test strips (GLUCOSE METER TEST) strip     Si each by In Vitro route 2 times daily As needed. Dispense:  300 each     Refill:  3    Blood Glucose Monitoring Suppl ([employer]S BLOOD GLUCOSE METER) w/Device KIT     Si each by Does not apply route in the morning and at bedtime     Dispense:  1 kit     Refill:  0    dexamethasone (DECADRON) 0.5 MG tablet     Sig: Take 2 tablets by mouth once for 1 dose At 11 pm night before blood draw     Dispense:  2 tablet     Refill:  0       Patient given educational materials - see patient instructions.   Discussed use,benefit, and side effects of prescribed medications. All patient questions answered. Pt voiced understanding. Reviewed health maintenance. Instructed to continue currentmedications, diet and exercise.     Electronically signed by TORREY Alford CNP, CNP on 11/15/2022 at 2:27 PM

## 2022-11-17 DIAGNOSIS — M25.561 ACUTE PAIN OF RIGHT KNEE: Primary | ICD-10-CM

## 2022-11-17 RX ORDER — HYDROCODONE BITARTRATE AND ACETAMINOPHEN 5; 325 MG/1; MG/1
1 TABLET ORAL EVERY 6 HOURS PRN
Qty: 28 TABLET | Refills: 0 | Status: SHIPPED | OUTPATIENT
Start: 2022-11-17 | End: 2022-11-24

## 2022-11-18 DIAGNOSIS — R52 PAIN: Primary | ICD-10-CM

## 2022-11-22 ENCOUNTER — OFFICE VISIT (OUTPATIENT)
Dept: ORTHOPEDIC SURGERY | Age: 62
End: 2022-11-22
Payer: COMMERCIAL

## 2022-11-22 DIAGNOSIS — M17.11 PRIMARY OSTEOARTHRITIS OF RIGHT KNEE: Primary | ICD-10-CM

## 2022-11-22 DIAGNOSIS — M25.561 ACUTE PAIN OF RIGHT KNEE: ICD-10-CM

## 2022-11-22 PROCEDURE — 99204 OFFICE O/P NEW MOD 45 MIN: CPT | Performed by: ORTHOPAEDIC SURGERY

## 2022-11-22 PROCEDURE — G8484 FLU IMMUNIZE NO ADMIN: HCPCS | Performed by: ORTHOPAEDIC SURGERY

## 2022-11-22 PROCEDURE — 1036F TOBACCO NON-USER: CPT | Performed by: ORTHOPAEDIC SURGERY

## 2022-11-22 PROCEDURE — 3017F COLORECTAL CA SCREEN DOC REV: CPT | Performed by: ORTHOPAEDIC SURGERY

## 2022-11-22 PROCEDURE — G8427 DOCREV CUR MEDS BY ELIG CLIN: HCPCS | Performed by: ORTHOPAEDIC SURGERY

## 2022-11-22 PROCEDURE — G8417 CALC BMI ABV UP PARAM F/U: HCPCS | Performed by: ORTHOPAEDIC SURGERY

## 2022-11-22 NOTE — PROGRESS NOTES
[address] [facility]  [address]  Dept: [phone]    Orthopaedic Trauma New Patient      CHIEF COMPLAINT:    Chief Complaint   Patient presents with    Follow-Up from [facility]     [facility] fall [facility] [address]:    The patient is a 57 y.o. male who is being seen as a new patient for right knee hematoma after sustaining a fall while on vacation 11/9/22. Patient reports that he slipped on some wet tile. Patient presented to the ED on 11/13/2022 due to persistent knee pain and swelling. X-rays were negative for any fracture, though significant hematoma was appreciated. Patient is currently on Eliquis for extensive cardiac history. 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 Patient was formally uncontrolled diabetic that was most recent A1c was 3.5%. Patient has a history of right Achilles tendon avulsion that was treated surgically with wound issues chronic wound care centimeters ago. He is not able to take NSAIDs due to kidney history. Patient has been icing his right knee regularly. Patient reports no numbness, tingling, weakness. Patient is wearing an Ace wrap for compression and accompanied by his wife today. Patient has no other orthopedic complaints this time. Past Medical History:    Past Medical History:   Diagnosis Date    Arthritis     BILATERAL KNEESand right shoulder    Back pain     Colostomy RUQ 09/23/2020    Frequent headaches     10/28/2020 PATIENT STATES EVERY OTHER DAY. Gout     History of atrial fibrillation     AFTER SURGERY ON 09/23/2020 WENT INTO A FIB. CONVERTED BACK TO NORMAL RHYTHM ON HIS OWN.  CARDIOLOGIST DR Fairbanks Cons    History of blood transfusion     NO REACTION    [place] (hard of hearing)     HTN     Dyke Belch, CNP    Hyperkalemia     Hyperlipidemia     Incisional hernia Kidney infection     Sepsis ([facility] [address]     post surgery. leak in bowel    Sleep apnea     USES CPAP MACHINE    T2DM     K. Álvaro Arthur, CNP.  diet controlled    Tooth missing     RIGHT UPPER 2ND MOLAR    Under care of team 08/2021    Dr. Jaclyn Shore [facility]    Wears glasses     READING    Wellness examination 07/2021    Sharyn Rose CNP [place] [facility] An[facility] Sanchez        Past Surgical History:    Past Surgical History:   Procedure Laterality Date    ADENOIDECTOMY      TWICE AS A CHILD    ANKLE SURGERY Right 2010    RUPTURED ACHILES    CARPAL TUNNEL RELEASE Bilateral     COLOSTOMY      temporary    CYSTO/URETERO/PYELOSCOPY, CALCULUS TX Right 10/30/2020    HOLMIUM-STANDBY, CYSTOSCOPY, URETEROSCOPY, STENT EXCHANGE performed by Katey Coe MD at [address] Right 09/01/2020    CYSTOSCOPY RIGHT URETERAL STENT INSERTION performed by Parish Fajardo MD at [address] SHEN POWER PICC TRIPLE  09/03/2020    REMOVED AROUND 10/07/2020    KIDNEY SURGERY Left 09/02/2020    LAPAROSCOPIC XI ROBOTIC NEPHRECTOMY performed by Abebe Canada MD at [address] [facility] ARTHROSCOPY Bilateral     LAPAROTOMY N/A 09/23/2020    LAPAROTOMY EXPLORATORY performed by Marijean Mohs, DO at [address] 09/02/2020    EXPLORATORY LAPAROTOMY, RESECTION OF PROXIMAL JEJUNUM AND DESCENDING COLON WITH MOBILIZATION OF SPLENIC FLEXURE AND PRIMARY ANASTAMOSISOF SMALL BOWEL AND COLON, PLACEMENT OF GASTROSTOMY TUBE performed by Marijean Mohs, DO at [address] 12/18/2020    EXPLORATORY LAPAROTOMY,COLOSTOMY REVERSAL, TAP BLOCK DONE IN OR BY DR Candice Field performed by Marijean Mohs, DO at [address] 3 TIMES  AS A CHILD    TYMPANOSTOMY TUBE PLACEMENT      UPPER GASTROINTESTINAL ENDOSCOPY N/A 1/5/2022    EGD BIOPSY performed by Caprice Alvarez MD at [facility]       Current Medications:   Current Outpatient Medications   Medication Sig Dispense Refill HYDROcodone-acetaminophen (NORCO) 5-325 MG per tablet Take 1 tablet by mouth every 6 hours as needed for Pain for up to 7 days. Intended supply: 7 days. Take lowest dose possible to manage pain 28 tablet 0    Cholecalciferol (VITAMIN D) 50 MCG (2000 UT) CAPS capsule Take by mouth 25,000 monthly      blood glucose test strips (GLUCOSE METER TEST) strip 1 each by In Vitro route 2 times daily As needed. 300 each 3    Blood Glucose Monitoring Suppl ([employer] BLOOD GLUCOSE METER) w/Device KIT 1 each by Does not apply route in the morning and at bedtime 1 kit 0    XARELTO 20 MG TABS tablet Take 20 mg by mouth daily      zolpidem (AMBIEN CR) 12.5 MG extended release tablet Take 1 tablet by mouth nightly as needed for Sleep for up to 90 days. 90 tablet 1    cyclobenzaprine (FLEXERIL) 10 MG tablet TAKE 1 TABLET BY MOUTH 2 TIMES DAILY AS NEEDED FOR MUSCLE SPASMS. 180 tablet 1    dilTIAZem (CARDIZEM) 60 MG tablet Take 1 tablet by mouth in the morning and 1 tablet in the evening.  180 tablet 3    pravastatin (PRAVACHOL) 20 MG tablet TAKE 1 TABLET BY MOUTH EVERY DAY 30 tablet 5    DULoxetine (CYMBALTA) 20 MG extended release capsule Take 20 mg by mouth daily      sodium bicarbonate 650 MG tablet Take 650 mg by mouth 4 times daily      allopurinol (ZYLOPRIM) 100 MG tablet TAKE 1 TABLET BY MOUTH EVERY DAY 90 tablet 3    simethicone (MYLICON) 80 MG chewable tablet Take 1 tablet by mouth 4 times daily as needed for Flatulence 180 tablet 3    hydrOXYzine (ATARAX) 50 MG tablet TAKE 1 TABLET BY MOUTH EVERY DAY AT NIGHT 90 tablet 3    pantoprazole (PROTONIX) 40 MG tablet Take 1 tablet by mouth 2 times daily (before meals) 60 tablet 1    sodium zirconium cyclosilicate (LOKELMA) 10 g PACK oral suspension Take 10 g by mouth daily      metoprolol (LOPRESSOR) 100 MG tablet Take 100 mg by mouth 2 times daily       ferrous sulfate (IRON 325) 325 (65 Fe) MG tablet Take 1 tablet by mouth daily (with breakfast) 180 tablet 1    CPAP Machine MISC use as directed      Respiratory Therapy Supplies (CARETOUCH CPAP & BIPAP HOSE) MISC Mask and Tubing      Melatonin 10 MG TABS Take 1 tablet by mouth nightly as needed      Multiple Vitamins-Minerals (THERAPEUTIC MULTIVITAMIN-MINERALS) tablet Take 1 tablet by mouth daily 90 tablet 1    vitamin C (ASCORBIC ACID) 500 MG tablet Take 500 mg by mouth daily      acetaminophen (TYLENOL) 500 MG tablet Take 2 tablets by mouth every 6 hours as needed for Pain 40 tablet 0    aspirin 81 MG tablet Take 81 mg by mouth nightly Stopped 12/14/2020       No current facility-administered medications for this visit. Allergies:    Ampicillin, Pcn [penicillins], Sulfa antibiotics, and Tape Elta Sid tape]    Social History:   Social History     Socioeconomic History    Marital status:      Spouse name: Not on file    Number of children: Not on file    Years of education: Not on file    Highest education level: Not on file   Occupational History    Not on file   Tobacco Use    Smoking status: Never    Smokeless tobacco: Former     Types: Chew     Quit date: 1980   Vaping Use    Vaping Use: Never used   Substance and Sexual Activity    Alcohol use:  Yes     Alcohol/week: 2.0 standard drinks     Types: 2 Cans of beer per week     Comment: socially     Drug use: Yes     Types: Marijuana Aloma Izzy)     Comment: edibles    Sexual activity: Not Currently   Other Topics Concern    Not on file   Social History Narrative    Not on file     Social Determinants of Health     Financial Resource Strain: Low Risk     Difficulty of Paying Living Expenses: Not hard at all   Food Insecurity: No Food Insecurity    Worried About Running Out of Food in the Last Year: Never true    Ran Out of Food in the Last Year: Never true   Transportation Needs: Not on file   Physical Activity: Not on file   Stress: Not on file   Social Connections: Not on file   Intimate Partner Violence: Not on file   Housing Stability: Not on file       Family History:  Family History Problem Relation Age of Onset    Stroke Maternal Grandmother     Stroke Maternal Grandfather     Other Mother         AORTIC BYPASS LED TO KIDNEY FAILURE    Kidney Disease Mother     Diabetes Father     Stroke Father          REVIEW OF SYSTEMS:  Review of Systems    Gen: no fever, chills, malaise  CV: no chest pain or palpitations  Resp: no cough or shortness of breath  GI: no nausea, vomiting, diarrhea, or constipation  Neuro: no numbness, tingling, or weakness  Msk: Right knee pain and swelling  10 remaining systems reviewed and negative      PHYSICAL EXAM:  There were no vitals taken for this visit. There is no height or weight on file to calculate BMI. Physical Exam  Gen: alert and oriented, no acute distress  Psych: Appropriate affect; Appropriate knowledge base; Appropriate mood; No hallucinations  Head: normocephalic atraumatic   Chest: symmetric chest excursion  Ortho Exam  MSK:   RLE: Skin intact with ecchymoses in various stages of healing over the knee. Tender palpation of the lateral joint line. Knee is stable to varus and valgus stress. Unable to fully extend with 5 degree contracture and flexion to 110 degrees. Patient has a negative Lachman's and posterior drawer. Patient has a palpable effusion. Negative Pamela's. Compartments soft. 2+ DP pulse. TA/EHL/FHL/GS motor intact. Deep and Superficial Peroneal/Saphenous/Sural/Plantar SILT. Radiology:   History: Right knee hematoma 11/9/2022    Comparison: 11/13/2022    Findings: 4 views of the right knee(AP/oblique/lateral/sunrise) showing no acute or chronic fractures. Moderate sized hematoma patient lateral unchanged previous exam.  Severe tricompartmental degenerative arthritis with subchondral sclerosis, osteophytes, and loss of joint space. Impression: Stable right knee with effusion and severe DJD. ASSESSMENT:  57 y.o. male with right knee hematoma, 11/9/2022    PLAN:     Patient seen and evaluated in clinic today.   Based on physical exam at this time it was recommended that the patient pursue a trial of physical therapy to be done 2-3 times a week for the next 8 weeks. Patient instructed to follow-up in clinic around the first of the new year, roughly 7 weeks to follow-up. Patient encouraged to continue to utilize compressive wraps as well as icing. Patient also encouraged to do home exercises to work on range of motion and prevent stiffness. Patient is already utilizing Voltaren gel and encouraged to continue doing so. Follow-up evaluation was discussed to consider a corticosteroid injection then the patient is still having persistent symptoms. Due to the severity of the patient's right knee DJD at this time I do not recommend evaluation for meniscus pathology or arthroscopic treatment. Patient was educated that he may eventually need a total knee replacement based on the degree of arthritis. Patient asked about a work note and is going to send in the appropriate paperwork for his job [employer] to be filled out for medical leave. Patient understood and agreed with the plan with all questions being answered to the patient's satisfaction at the time of visit. Return in about 7 weeks (around 1/10/2023). No orders of the defined types were placed in this encounter.       Orders Placed This Encounter   Procedures    External Referral To Physical Therapy     Referral Priority:   Routine     Referral Type:   Eval and Treat     Referral Reason:   Specialty Services Required     Requested Specialty:   Physical Therapist     Number of Visits Requested:   1          Electronically signed by Thelma Rossi DO on11/22/2022 at 5:50 PM

## 2022-12-01 DIAGNOSIS — E78.1 HYPERTRIGLYCERIDEMIA: ICD-10-CM

## 2022-12-01 RX ORDER — PRAVASTATIN SODIUM 20 MG
TABLET ORAL
Qty: 90 TABLET | Refills: 1 | Status: SHIPPED | OUTPATIENT
Start: 2022-12-01

## 2022-12-02 DIAGNOSIS — E11.9 TYPE 2 DIABETES MELLITUS WITHOUT COMPLICATION, UNSPECIFIED WHETHER LONG TERM INSULIN USE (HCC): ICD-10-CM

## 2022-12-02 RX ORDER — DEXAMETHASONE 0.5 MG/1
1 TABLET ORAL ONCE
Qty: 2 TABLET | Refills: 0 | Status: SHIPPED | OUTPATIENT
Start: 2022-12-02 | End: 2022-12-02

## 2022-12-15 ENCOUNTER — OFFICE VISIT (OUTPATIENT)
Dept: ORTHOPEDIC SURGERY | Age: 62
End: 2022-12-15

## 2022-12-15 VITALS — WEIGHT: 279 LBS | BODY MASS INDEX: 39.06 KG/M2 | HEIGHT: 71 IN

## 2022-12-15 DIAGNOSIS — M17.11 PRIMARY OSTEOARTHRITIS OF RIGHT KNEE: Primary | ICD-10-CM

## 2022-12-15 DIAGNOSIS — S80.01XD TRAUMATIC HEMATOMA OF RIGHT KNEE, SUBSEQUENT ENCOUNTER: ICD-10-CM

## 2022-12-15 NOTE — PROGRESS NOTES
[facility]  [id number] [facility]  Dept Phone: [phone]  Dept Fax: [phone]      Orthopaedic Trauma Clinic Follow Up      Subjective:   Date of injury: 11/9/2022    Mp Hughes II is a 57y.o. year old male who presents to the clinic today for routine follow up 6 weeks after sustaining a fall onto his right knee while on vacation. Patient presents today for a return to work release note. Patient states since he was last seen in the clinic, he did not attend formal physical therapy but states he is very familiar with previous leg/knee exercises from working out in the past. States since doing his own therapy, his knee pain has significantly improved. He continues to have baseline knee pain from his arthritis, worse with weather changes, but no longer has pain from his fall and the hematoma has resolved. Denies any numbness or tingling. Denies any new injuries or falls. Patient works at [employer] and feels he is ready to return back to work. Review of Systems  Gen: no fever, chills, malaise  CV: no chest pain or palpitations  Resp: no cough or shortness of breath  GI: no nausea, vomiting, diarrhea, or constipation  Neuro: no seizures, vertigo, or headache  Msk:  right knee arthritis pain   10 remaining systems reviewed and negative    Objective : There were no vitals filed for this visit. Body mass index is 38.91 kg/m². General: No acute distress, resting comfortably in the clinic  Neuro: alert. oriented  Eyes: Extra-ocular muscles intact  Pulm: Respirations unlabored and regular. Skin: warm, well perfused  Psych:   Patient has good fund of knowledge and displays understanding of exam, diagnosis, and plan. RLE:  Skin intact. No swelling, erythema, ecchymosis, wounds. No palpable effusion. No TTP about the knee. 0-120 degrees of range of motion of the knee. Knee ligaments stable to varus and valgus stress with knee flexed at 30 degrees.  Able to straight leg raise. Compartments soft. 2+ DP pulse. TA/EHL/FHL/GS motor intact. Deep and Superficial Peroneal/Saphenous/Sural SILT. Radiology:  No radiographs obtained today. Assessment:   57y.o. year old male with a right knee hematoma s/p fall on 11/13/22. Plan:   - Patient's symptoms and range of motion have improved from his previous visit. - Discussed the options for a steroid or gel injection for his knee arthritis if he would ever like one of these in the future. Provided information for Dr. Darius Bran if he ever wants to pursue any kind treatment for his arthritis. Patient verbalized understanding and wishes to proceed with follow up in the near future before he retires. - Paperwork filled out and faxed for return to work on 12/20/22 with no restrictions. - He may follow up on an as needed basis. Follow up:Return if symptoms worsen or fail to improve. No orders of the defined types were placed in this encounter. No orders of the defined types were placed in this encounter. Electronically signed by TORREY Ng CNP on 12/15/2022 at 9:41 AM    This note is created with the assistance of a speech recognition program.  While intending to generate a document that actually reflects the content of the visit, the document can still have some errors including those of syntax and sound a like substitutions which may escape proof reading.   In such instances, actual meaning can be extrapolated by contextual diversion

## 2022-12-19 ENCOUNTER — HOSPITAL ENCOUNTER (OUTPATIENT)
Age: 62
Setting detail: SPECIMEN
Discharge: HOME | End: 2022-12-19

## 2022-12-19 DIAGNOSIS — E78.1 HYPERTRIGLYCERIDEMIA: ICD-10-CM

## 2022-12-19 DIAGNOSIS — Z00.00 ROUTINE GENERAL MEDICAL EXAMINATION AT A HEALTH CARE FACILITY: ICD-10-CM

## 2022-12-19 DIAGNOSIS — E11.9 TYPE 2 DIABETES MELLITUS WITHOUT COMPLICATION, UNSPECIFIED WHETHER LONG TERM INSULIN USE (HCC): ICD-10-CM

## 2022-12-19 LAB
ABSOLUTE EOS #: 0.15 K/UL (ref 0–0.44)
ABSOLUTE IMMATURE GRANULOCYTE: 0.13 K/UL (ref 0–0.3)
ABSOLUTE LYMPH #: 1.97 K/UL (ref 1.1–3.7)
ABSOLUTE MONO #: 0.54 K/UL (ref 0.1–1.2)
ALBUMIN SERPL-MCNC: 3.8 G/DL (ref 3.5–5.2)
ALBUMIN/GLOBULIN RATIO: 1 (ref 1–2.5)
ALP BLD-CCNC: 107 U/L (ref 40–129)
ALT SERPL-CCNC: 41 U/L (ref 5–41)
ANION GAP SERPL CALCULATED.3IONS-SCNC: 16 MMOL/L (ref 9–17)
AST SERPL-CCNC: 47 U/L
BASOPHILS # BLD: 1 % (ref 0–2)
BASOPHILS ABSOLUTE: 0.06 K/UL (ref 0–0.2)
BILIRUB SERPL-MCNC: 0.4 MG/DL (ref 0.3–1.2)
BUN BLDV-MCNC: 47 MG/DL (ref 8–23)
CALCIUM SERPL-MCNC: 9.3 MG/DL (ref 8.6–10.4)
CHLORIDE BLD-SCNC: 94 MMOL/L (ref 98–107)
CHOLESTEROL/HDL RATIO: 6.4
CHOLESTEROL: 197 MG/DL
CO2: 17 MMOL/L (ref 20–31)
CORTISOL COLLECTION INFO: ABNORMAL
CORTISOL: 1.6 UG/DL (ref 2.7–18.4)
CREAT SERPL-MCNC: 2.68 MG/DL (ref 0.7–1.2)
CREATININE URINE: 54.4 MG/DL (ref 39–259)
EOSINOPHILS RELATIVE PERCENT: 1 % (ref 1–4)
GFR SERPL CREATININE-BSD FRML MDRD: 26 ML/MIN/1.73M2
GLUCOSE BLD-MCNC: 436 MG/DL (ref 70–99)
HCT VFR BLD CALC: 47.4 % (ref 40.7–50.3)
HDLC SERPL-MCNC: 31 MG/DL
HEMOGLOBIN: 15.8 G/DL (ref 13–17)
IMMATURE GRANULOCYTES: 1 %
LDL CHOLESTEROL DIRECT: 89 MG/DL
LDL CHOLESTEROL: ABNORMAL MG/DL (ref 0–130)
LYMPHOCYTES # BLD: 16 % (ref 24–43)
MCH RBC QN AUTO: 30.3 PG (ref 25.2–33.5)
MCHC RBC AUTO-ENTMCNC: 33.3 G/DL (ref 28.4–34.8)
MCV RBC AUTO: 91 FL (ref 82.6–102.9)
MICROALBUMIN/CREAT 24H UR: 1750 MG/L
MICROALBUMIN/CREAT UR-RTO: 3217 MCG/MG CREAT
MONOCYTES # BLD: 4 % (ref 3–12)
NRBC AUTOMATED: 0 PER 100 WBC
PDW BLD-RTO: 12.9 % (ref 11.8–14.4)
PLATELET # BLD: 226 K/UL (ref 138–453)
PMV BLD AUTO: 11.8 FL (ref 8.1–13.5)
POTASSIUM SERPL-SCNC: 5.9 MMOL/L (ref 3.7–5.3)
RBC # BLD: 5.21 M/UL (ref 4.21–5.77)
SEG NEUTROPHILS: 77 % (ref 36–65)
SEGMENTED NEUTROPHILS ABSOLUTE COUNT: 9.56 K/UL (ref 1.5–8.1)
SODIUM BLD-SCNC: 127 MMOL/L (ref 135–144)
TOTAL PROTEIN: 7.7 G/DL (ref 6.4–8.3)
TRIGL SERPL-MCNC: 495 MG/DL
WBC # BLD: 12.4 K/UL (ref 3.5–11.3)

## 2022-12-20 ENCOUNTER — OFFICE VISIT (OUTPATIENT)
Dept: FAMILY MEDICINE CLINIC | Age: 62
End: 2022-12-20
Payer: COMMERCIAL

## 2022-12-20 VITALS
TEMPERATURE: 98 F | HEART RATE: 103 BPM | HEIGHT: 71 IN | DIASTOLIC BLOOD PRESSURE: 86 MMHG | BODY MASS INDEX: 37.74 KG/M2 | OXYGEN SATURATION: 94 % | WEIGHT: 269.6 LBS | SYSTOLIC BLOOD PRESSURE: 136 MMHG

## 2022-12-20 DIAGNOSIS — N18.32 STAGE 3B CHRONIC KIDNEY DISEASE (HCC): ICD-10-CM

## 2022-12-20 DIAGNOSIS — E87.5 HYPERKALEMIA: ICD-10-CM

## 2022-12-20 DIAGNOSIS — F43.9 STRESS AT HOME: ICD-10-CM

## 2022-12-20 DIAGNOSIS — E11.65 UNCONTROLLED TYPE 2 DIABETES MELLITUS WITH HYPERGLYCEMIA (HCC): ICD-10-CM

## 2022-12-20 DIAGNOSIS — E87.1 HYPONATREMIA: ICD-10-CM

## 2022-12-20 DIAGNOSIS — E11.9 TYPE 2 DIABETES MELLITUS WITHOUT COMPLICATION, UNSPECIFIED WHETHER LONG TERM INSULIN USE (HCC): Primary | ICD-10-CM

## 2022-12-20 DIAGNOSIS — F51.02 ADJUSTMENT INSOMNIA: ICD-10-CM

## 2022-12-20 LAB — DHEAS (DHEA SULFATE): 73 UG/DL (ref 42–290)

## 2022-12-20 PROCEDURE — 3074F SYST BP LT 130 MM HG: CPT | Performed by: NURSE PRACTITIONER

## 2022-12-20 PROCEDURE — 3078F DIAST BP <80 MM HG: CPT | Performed by: NURSE PRACTITIONER

## 2022-12-20 PROCEDURE — 3046F HEMOGLOBIN A1C LEVEL >9.0%: CPT | Performed by: NURSE PRACTITIONER

## 2022-12-20 PROCEDURE — G8484 FLU IMMUNIZE NO ADMIN: HCPCS | Performed by: NURSE PRACTITIONER

## 2022-12-20 PROCEDURE — 1036F TOBACCO NON-USER: CPT | Performed by: NURSE PRACTITIONER

## 2022-12-20 PROCEDURE — 2022F DILAT RTA XM EVC RTNOPTHY: CPT | Performed by: NURSE PRACTITIONER

## 2022-12-20 PROCEDURE — G8417 CALC BMI ABV UP PARAM F/U: HCPCS | Performed by: NURSE PRACTITIONER

## 2022-12-20 PROCEDURE — 3017F COLORECTAL CA SCREEN DOC REV: CPT | Performed by: NURSE PRACTITIONER

## 2022-12-20 PROCEDURE — G8427 DOCREV CUR MEDS BY ELIG CLIN: HCPCS | Performed by: NURSE PRACTITIONER

## 2022-12-20 PROCEDURE — 99215 OFFICE O/P EST HI 40 MIN: CPT | Performed by: NURSE PRACTITIONER

## 2022-12-20 RX ORDER — SEMAGLUTIDE 1.34 MG/ML
0.25 INJECTION, SOLUTION SUBCUTANEOUS WEEKLY
Qty: 3 ML | Refills: 0 | Status: SHIPPED | OUTPATIENT
Start: 2022-12-20

## 2022-12-20 RX ORDER — FLASH GLUCOSE SENSOR
1 KIT MISCELLANEOUS CONTINUOUS
Qty: 2 EACH | Refills: 5 | Status: SHIPPED | OUTPATIENT
Start: 2022-12-20

## 2022-12-20 RX ORDER — ZALEPLON 10 MG/1
10 CAPSULE ORAL NIGHTLY
Qty: 14 CAPSULE | Refills: 0 | Status: SHIPPED | OUTPATIENT
Start: 2022-12-20 | End: 2023-01-03

## 2022-12-20 ASSESSMENT — ENCOUNTER SYMPTOMS
WHEEZING: 0
TROUBLE SWALLOWING: 0
CHOKING: 0
CONSTIPATION: 0
ABDOMINAL PAIN: 0
CHEST TIGHTNESS: 0
SHORTNESS OF BREATH: 0
DIARRHEA: 0
COUGH: 1

## 2022-12-20 NOTE — PROGRESS NOTES
[address] [facility] access hospital, [address]  [phone]      Lior Genao II is a 57 y.o. male who presents today for his  medicalconditions/complaints as noted below. Lior Genao II is c/o of Diabetes (A1c 10.5 on 11/05/22. Hasnt been taking meds as directed, cutting them in half, has been watching diet. Only meds he has been taking consistently are BP and blood thinner) and Cough (Deep in chest, colored phlegm, started a week ago)  . HPI:    Diabetes  Hypoglycemia symptoms include nervousness/anxiousness. Pertinent negatives for hypoglycemia include no confusion, dizziness, headaches or speech difficulty. Pertinent negatives for diabetes include no chest pain, no fatigue, no polydipsia, no polyphagia, no polyuria and no weakness. Cough  Pertinent negatives include no chest pain, chills, fever, headaches, shortness of breath or wheezing. Diabetes:  States he has been off medications for several months. Has been working on high-protein low-carb diet and has lost about 20 pounds. States he has not been checking blood sugars as he is out of lancets but questions if he can have a freestyle continuous glucose monitor order. States he has not seen nephrology doctor since August 2022. He has had no other changes to his medication except that he has been doubling up on his Ambien as he is having a lot of trouble sleeping recently. States he has been out of work since he fell and hurt his knee and also found out his son's boyfriend has moved in with them. States he would like to try something else for sleep if possible. Denies dizziness, chest pain, shortness of breath, weakness, numbness or tingling. Admits he is drinking 5-6 bottles of water a day plus protein shakes. Has a history of high potassium levels. Feels like he could benefit from some diabetes education for diet.   Past Medical History:   Diagnosis Date    Arthritis     BILATERAL Lebron Battles right shoulder    Back pain     Colostomy RUQ 09/23/2020    Frequent headaches     10/28/2020 PATIENT STATES EVERY OTHER DAY. Gout     History of atrial fibrillation     AFTER SURGERY ON 09/23/2020 WENT INTO A FIB. CONVERTED BACK TO NORMAL RHYTHM ON HIS OWN. CARDIOLOGIST DR Jenny West    History of blood transfusion     NO REACTION    [place]     HTN     Moni Goel, BRIT    Hyperkalemia     Hyperlipidemia     Incisional hernia     Kidney infection     Sepsis ([address]     post surgery. leak in bowel    Sleep apnea     USES CPAP MACHINE    T2DM     [facility] [place], CNP.  diet controlled    Tooth missing     RIGHT UPPER 2ND MOLAR    Under care of team 08/2021    Dr. Becca Tafoya    Wears glasses     READING    Wellness examination 07/2021    Juan Barnes CNP [place] [facility]       Past Surgical History:   Procedure Laterality Date    ADENOIDECTOMY      TWICE AS A CHILD    ANKLE SURGERY Right 2010    RUPTURED ACHILES    CARPAL TUNNEL RELEASE Bilateral     COLOSTOMY      temporary    CYSTO/URETERO/PYELOSCOPY, CALCULUS TX Right 10/30/2020    HOLMIUM-STANDBY, CYSTOSCOPY, URETEROSCOPY, STENT EXCHANGE performed by Sariah Jesus MD at [address] Right 09/01/2020    CYSTOSCOPY RIGHT URETERAL STENT INSERTION performed by Vinicio Murdock MD at [address] [facility] [address] SHEN POWER PICC TRIPLE  09/03/2020    REMOVED AROUND 10/07/2020    KIDNEY SURGERY Left 09/02/2020    LAPAROSCOPIC XI ROBOTIC NEPHRECTOMY performed by Martha Hickman MD at [address] [facility] ARTHROSCOPY Bilateral     LAPAROTOMY N/A 09/23/2020    LAPAROTOMY EXPLORATORY performed by Gloria Mckeon DO at [address] N/A 09/02/2020    EXPLORATORY LAPAROTOMY, RESECTION OF PROXIMAL JEJUNUM AND DESCENDING COLON WITH MOBILIZATION OF SPLENIC FLEXURE AND PRIMARY ANASTAMOSISOF SMALL BOWEL AND COLON, PLACEMENT OF GASTROSTOMY TUBE performed by Gloria Mckeon DO at [address] 12/18/2020    EXPLORATORY LAPAROTOMY,COLOSTOMY REVERSAL, TAP BLOCK DONE IN OR BY DR Cricket Marshall performed by Nery Ness DO at [address] 3 TIMES  AS A CHILD    TYMPANOSTOMY TUBE PLACEMENT      UPPER GASTROINTESTINAL ENDOSCOPY N/A 1/5/2022    EGD BIOPSY performed by Randi Lara MD at [facility]     Family History   Problem Relation Age of Onset    Stroke Maternal Grandmother     Stroke Maternal Grandfather     Other Mother         AORTIC BYPASS LED TO KIDNEY FAILURE    Kidney Disease Mother     Diabetes Father     Stroke Father      Social History     Tobacco Use    Smoking status: Never    Smokeless tobacco: Former     Types: Chew     Quit date: 1980   Substance Use Topics    Alcohol use: Yes     Alcohol/week: 2.0 standard drinks     Types: 2 Cans of beer per week     Comment: socially       Current Outpatient Medications   Medication Sig Dispense Refill    Continuous Blood Gluc Sensor (FREESTYLE RANJIT 14 DAY SENSOR) MISC 1 each by Does not apply route continuous 2 each 5    Semaglutide,0.25 or 0.5MG/DOS, (OZEMPIC, 0.25 OR 0.5 MG/DOSE,) 2 MG/1.5ML SOPN Inject 0.25 mg into the skin once a week 3 mL 0    pravastatin (PRAVACHOL) 20 MG tablet TAKE 1 TABLET BY MOUTH EVERY DAY 90 tablet 1    Cholecalciferol (VITAMIN D) 50 MCG (2000 UT) CAPS capsule Take by mouth 25,000 monthly      XARELTO 20 MG TABS tablet Take 20 mg by mouth daily      cyclobenzaprine (FLEXERIL) 10 MG tablet TAKE 1 TABLET BY MOUTH 2 TIMES DAILY AS NEEDED FOR MUSCLE SPASMS. 180 tablet 1    dilTIAZem (CARDIZEM) 60 MG tablet Take 1 tablet by mouth in the morning and 1 tablet in the evening.  180 tablet 3    DULoxetine (CYMBALTA) 20 MG extended release capsule Take 20 mg by mouth daily      sodium bicarbonate 650 MG tablet Take 650 mg by mouth 4 times daily      allopurinol (ZYLOPRIM) 100 MG tablet TAKE 1 TABLET BY MOUTH EVERY DAY 90 tablet 3    simethicone (MYLICON) 80 MG chewable tablet Take 1 tablet by mouth 4 times daily as needed for Flatulence 180 tablet 3    hydrOXYzine (ATARAX) 50 MG tablet TAKE 1 TABLET BY MOUTH EVERY DAY AT NIGHT 90 tablet 3    pantoprazole (PROTONIX) 40 MG tablet Take 1 tablet by mouth 2 times daily (before meals) 60 tablet 1    sodium zirconium cyclosilicate (LOKELMA) 10 g PACK oral suspension Take 10 g by mouth daily      metoprolol (LOPRESSOR) 100 MG tablet Take 100 mg by mouth 2 times daily       ferrous sulfate (IRON 325) 325 (65 Fe) MG tablet Take 1 tablet by mouth daily (with breakfast) 180 tablet 1    CPAP Machine MISC use as directed      Respiratory Therapy Supplies (CARETOUCH CPAP & BIPAP HOSE) MISC Mask and Tubing      Melatonin 10 MG TABS Take 1 tablet by mouth nightly as needed      Multiple Vitamins-Minerals (THERAPEUTIC MULTIVITAMIN-MINERALS) tablet Take 1 tablet by mouth daily 90 tablet 1    vitamin C (ASCORBIC ACID) 500 MG tablet Take 500 mg by mouth daily      aspirin 81 MG tablet Take 81 mg by mouth nightly Stopped 12/14/2020      blood glucose test strips (GLUCOSE METER TEST) strip 1 each by In Vitro route 2 times daily As needed. (Patient not taking: No sig reported) 300 each 3    Blood Glucose Monitoring Suppl ([employer] BLOOD GLUCOSE METER) w/Device KIT 1 each by Does not apply route in the morning and at bedtime (Patient not taking: No sig reported) 1 kit 0    acetaminophen (TYLENOL) 500 MG tablet Take 2 tablets by mouth every 6 hours as needed for Pain 40 tablet 0     No current facility-administered medications for this visit. Allergies   Allergen Reactions    Ampicillin Swelling     Swelling of throat. Pcn [Penicillins] Swelling     Throat swelling. Tolerated cefepime during 8/31/20 admission. Sulfa Antibiotics      Other reaction(s): Unknown    Tape [Adhesive Tape] Other (See Comments)     CAUSES REDNESS. PAPER TAPE OK.        Health Maintenance   Topic Date Due    COVID-19 Vaccine (1) Never done    Diabetic retinal exam  Never done    DTaP/Tdap/Td vaccine (1 - Tdap) Never done    Shingles vaccine (1 of 2) Never done    Diabetic foot exam  02/01/2022    Flu vaccine (1) Never done    A1C test (Diabetic or Prediabetic)  02/15/2023    Depression Screen  05/03/2023    Diabetic microalbuminuria test  12/19/2023    Lipids  12/19/2023    Colorectal Cancer Screen  12/07/2025    Pneumococcal 0-64 years Vaccine  Completed    Hepatitis C screen  Completed    HIV screen  Completed    Hepatitis A vaccine  Aged Out    Hib vaccine  Aged Out    Meningococcal (ACWY) vaccine  Aged Out       Subjective:      Review of Systems   Constitutional:  Positive for activity change. Negative for appetite change, chills, fatigue and fever. HENT:  Negative for trouble swallowing. Eyes:  Negative for visual disturbance. Respiratory:  Positive for cough. Negative for choking, chest tightness, shortness of breath and wheezing. Cardiovascular:  Negative for chest pain and leg swelling. Gastrointestinal:  Negative for abdominal pain, constipation and diarrhea. Endocrine: Negative for polydipsia, polyphagia and polyuria. Genitourinary:  Negative for difficulty urinating and frequency. Musculoskeletal:  Positive for arthralgias (knee). Neurological:  Negative for dizziness, speech difficulty, weakness and headaches. Psychiatric/Behavioral:  Positive for sleep disturbance. Negative for agitation, confusion and dysphoric mood. The patient is nervous/anxious. Objective:      Physical Exam  Vitals and nursing note reviewed. Constitutional:       General: He is not in acute distress. Appearance: Normal appearance. He is well-developed. He is obese. He is not ill-appearing or diaphoretic. HENT:      Head: Normocephalic and atraumatic. Eyes:      Conjunctiva/sclera: Conjunctivae normal.   Cardiovascular:      Rate and Rhythm: Normal rate and regular rhythm. Pulses: Normal pulses. Heart sounds: Normal heart sounds. No murmur heard.      Comments: No LE edema  Pulmonary:      Effort: Pulmonary effort is normal.      Breath sounds: Normal breath sounds. Musculoskeletal:      Cervical back: Neck supple. Skin:     General: Skin is warm and dry. Neurological:      General: No focal deficit present. Mental Status: He is alert and oriented to person, place, and time. Mental status is at baseline. Psychiatric:         Attention and Perception: Attention and perception normal.         Mood and Affect: Mood and affect normal.         Speech: Speech normal.         Behavior: Behavior normal. Behavior is cooperative. Thought Content: Thought content normal.         Cognition and Memory: Cognition normal.         Judgment: Judgment normal.       Assessment:       Diagnosis Orders   1. Type 2 diabetes mellitus without complication, unspecified whether long term insulin use ([place] [id number]  Continuous Blood Gluc Sensor (FREESTYLE RANJIT 14 DAY SENSOR) [facility] Medication Mgmt (Diabetes - Clinical Pharmacy) - [facility]    Semaglutide,0.25 or 0.5MG/DOS, (OZEMPIC, 0.25 OR 0.5 MG/DOSE,) 2 MG/1.5ML SOPN      2. Uncontrolled type 2 diabetes mellitus with hyperglycemia ([place] [address]  Continuous Blood Gluc Sensor (FREESTYLE RANJIT 14 DAY SENSOR) [facility] Medication Mgmt (Diabetes - Clinical Pharmacy) - [facility]    Semaglutide,0.25 or 0.5MG/DOS, (OZEMPIC, 0.25 OR 0.5 MG/DOSE,) 2 MG/1.5ML SOPN      3. Stress at home        4. Adjustment insomnia        5. Hyperkalemia  Basic Metabolic Panel      6. Stage 3b chronic kidney disease ([facility]        7.  Hyponatremia            Chemistry        Component Value Date/Time     (L) 12/19/2022 0745    K 5.9 (H) 12/19/2022 0745    CL 94 (L) 12/19/2022 0745    CO2 17 (L) 12/19/2022 0745    BUN 47 (H) 12/19/2022 0745    CREATININE 2.68 (H) 12/19/2022 0745        Component Value Date/Time    CALCIUM 9.3 12/19/2022 0745    ALKPHOS 107 12/19/2022 0745    AST 47 (H) 12/19/2022 0745    ALT 41 12/19/2022 0745    BILITOT 0.4 12/19/2022 0745        Lab Results   Component Value Date    WBC 12.4 (H) 12/19/2022    HGB 15.8 12/19/2022    HCT 47.4 12/19/2022    MCV 91.0 12/19/2022     12/19/2022     Wt Readings from Last 3 Encounters:   12/20/22 269 lb 9.6 oz (122.3 kg)   12/15/22 279 lb (126.6 kg)   11/15/22 279 lb (126.6 kg)     BP Readings from Last 3 Encounters:   12/20/22 136/86   11/15/22 128/66   11/13/22 (!) 139/102     Lab Results   Component Value Date    CHOL 197 12/19/2022    CHOL 136 08/06/2021    CHOL 131 06/22/2020     Lab Results   Component Value Date    TRIG 495 (H) 12/19/2022    TRIG 456 (H) 08/06/2021    TRIG 115 09/05/2020     Lab Results   Component Value Date    HDL 31 (L) 12/19/2022    HDL 28 (L) 08/06/2021    HDL 31 (L) 06/22/2020     Lab Results   Component Value Date    LDLCHOLESTEROL      12/19/2022    LDLCHOLESTEROL      08/06/2021    LDLCHOLESTEROL 63 06/22/2020     Lab Results   Component Value Date    VLDL NOT REPORTED 08/06/2021    VLDL NOT REPORTED (H) 06/22/2020     Lab Results   Component Value Date    CHOLHDLRATIO 6.4 (H) 12/19/2022    CHOLHDLRATIO 4.9 08/06/2021    CHOLHDLRATIO 4.2 06/22/2020     Lab Results   Component Value Date    LABMICR 3,217 (H) 12/19/2022     Neg. DST  Hemoglobin A1C   Date Value Ref Range Status   11/15/2022 10.5 % Final       Plan:      No follow-ups on file.   Orders Placed This Encounter   Procedures    Basic Metabolic Panel     Standing Status:   Future     Standing Expiration Date:   12/20/2023    [facility] Diabetes Education [facility]     Referral Priority:   Routine     Referral Type:   Eval and Treat     Referral Reason:   Specialty Services Required     Number of Visits Requested:   [address] Medication Mgmt (Diabetes - Clinical Pharmacy) - [facility]     Referral Priority:   Routine     Referral Type:   Consult for Advice and Opinion     Referral Reason:   Specialty Services Required     Requested Specialty:   Pharmacist Number of Visits Requested:   1     Expiration Date:   2024     Orders Placed This Encounter   Medications    Continuous Blood Gluc Sensor (FREESTYLE RANJIT 14 DAY SENSOR) MISC     Si each by Does not apply route continuous     Dispense:  2 each     Refill:  5    Semaglutide,0.25 or 0.5MG/DOS, (OZEMPIC, 0.25 OR 0.5 MG/DOSE,) 2 MG/1.5ML SOPN     Sig: Inject 0.25 mg into the skin once a week     Dispense:  3 mL     Refill:  0     Dm:  Labs showing glucose yesterday was 436  Pt advised of this and start with CGM asap if able  F/u with pharmacy med mgmt asap  Strict diabetic diet/HO given for now  Has poor kidney fnc and will let pharmacy manage meds and advise pt to call nephro for f/u appt asap with worsening kidney fnc  Start ozempic for now low dose weekly  Does not want metformin either  Monitor feet daily  Also f/u for diabetes ed. Classes when able    [place]:  Recheck labs in 2-3 days  Slightly reduce water intake and limit high K+ rich foods discussed  Will call with test results  Go to ED if any new dizziness, chest pain, weakness or new swelling    Sleep:  Stop [employer] and call with update    * 40 mins spent with pt discussing multiple health problems and counseling on all treatment  options    Patient given educational materials - see patient instructions. Discussed use,benefit, and side effects of prescribed medications. All patient questions answered. Pt voiced understanding. Reviewed health maintenance. Instructed to continue currentmedications, diet and exercise.     Electronically signed by TORREY Reina CNP, CNP on 2022 at 1:32 PM

## 2022-12-20 NOTE — PROGRESS NOTES
Visit Information    Have you changed or started any medications since your last visit including any over-the-counter medicines, vitamins, or herbal medicines? no   Have you stopped taking any of your medications? Is so, why? -  no  Are you having any side effects from any of your medications? - no    Have you seen any other physician or provider since your last visit?  no   Have you had any other diagnostic tests since your last visit?  no   Have you been seen in the emergency room and/or had an admission in a hospital since we last saw you?  no   Have you had your routine dental cleaning in the past 6 months?  no     Do you have an active MyChart account? If no, what is the barrier?   Yes    Patient Care Team:  TORREY Avalos CNP as PCP - General (Family Nurse Practitioner)  TORRE[facility]val[facility] CNP [facility]  Tim Stringer MD as Consulting Physician (Gastroenterology)    Medical History Review  Past Medical, Family, and Social History reviewed and  contribute to the patient presenting condition    Health Maintenance   Topic Date Due    COVID-19 Vaccine (1) Never done    Diabetic retinal exam  Never done    DTaP/Tdap/Td vaccine (1 - Tdap) Never done    Shingles vaccine (1 of 2) Never done    Diabetic foot exam  02/01/2022    Flu vaccine (1) Never done    A1C test (Diabetic or Prediabetic)  02/15/2023    Depression Screen  05/03/2023    Diabetic microalbuminuria test  12/19/2023    Lipids  12/19/2023    Colorectal Cancer Screen  12/07/2025    Pneumococcal 0-64 years Vaccine  Completed    Hepatitis C screen  Completed    HIV screen  Completed    Hepatitis A vaccine  Aged Out    Hib vaccine  Aged Out    Meningococcal (ACWY) vaccine  Aged Out

## 2022-12-23 LAB — DEXAMETHASONE: 634.8 NG/DL

## 2022-12-27 ENCOUNTER — TELEPHONE (OUTPATIENT)
Dept: FAMILY MEDICINE CLINIC | Age: 62
End: 2022-12-27

## 2023-01-03 ENCOUNTER — HOSPITAL ENCOUNTER (OUTPATIENT)
Dept: DIABETES SERVICES | Age: 63
Setting detail: THERAPIES SERIES
Discharge: HOME | End: 2023-01-03
Payer: COMMERCIAL

## 2023-01-03 DIAGNOSIS — E11.9 TYPE 2 DIABETES MELLITUS WITHOUT COMPLICATION, UNSPECIFIED WHETHER LONG TERM INSULIN USE (HCC): Primary | ICD-10-CM

## 2023-01-03 PROCEDURE — G0108 DIAB MANAGE TRN  PER INDIV: HCPCS

## 2023-01-03 RX ORDER — LANCETS 30 GAUGE
1 EACH MISCELLANEOUS 3 TIMES DAILY
Qty: 300 EACH | Refills: 5 | Status: SHIPPED | OUTPATIENT
Start: 2023-01-03

## 2023-01-03 SDOH — ECONOMIC STABILITY: FOOD INSECURITY: ADDITIONAL INFORMATION: NO

## 2023-01-03 NOTE — PROGRESS NOTES
Diabetes Self- Management Education Program Assessment -   Also see Diabetic Screening  Patient, Christy Blackman,  here for diabetes self-management education  visit/ assessment. Today's visit was in an individual setting. MEDICAL HISTORY:  Past Medical History:   Diagnosis Date    Arthritis     BILATERAL KNEESand right shoulder    Back pain     Colostomy RUQ 09/23/2020    Frequent headaches     10/28/2020 PATIENT STATES EVERY OTHER DAY. Gout     History of atrial fibrillation     AFTER SURGERY ON 09/23/2020 WENT INTO A FIB. CONVERTED BACK TO NORMAL RHYTHM ON HIS OWN. CARDIOLOGIST DR Trevino Query    History of blood transfusion     NO REACTION    [place] (hard of hearing)     HTN     Luis Janis, CNP    Hyperkalemia     Hyperlipidemia     Incisional hernia     Kidney infection     Sepsis ([address]     post surgery. leak in bowel    Sleep apnea     USES CPAP MACHINE    T2DM     K. Franchesca Olivas, CNP.  diet controlled    Tooth missing     RIGHT UPPER 2ND MOLAR    Under care of team 08/2021    Dr. Natalia Araiza    Wears glasses     READING    Wellness examination 07/2021    Adenike Lemons CNP [place]      Family History   Problem Relation Age of Onset    Stroke Maternal Grandmother     Stroke Maternal Grandfather     Other Mother         AORTIC BYPASS LED TO KIDNEY FAILURE    Kidney Disease Mother     Diabetes Father     Stroke Father      Ampicillin, Pcn [penicillins], Sulfa antibiotics, and Tape [adhesive tape]   Immunization History   Administered Date(s) Administered    Pneumococcal Polysaccharide ([id number]) 12/12/2016     Current Medications  Current Outpatient Medications   Medication Sig Dispense Refill    Continuous Blood Gluc Sensor (Data Stream CBOTSTYLE RANJIT 14 DAY SENSOR) MISC 1 each by Does not apply route continuous 2 each 5    Semaglutide,0.25 or 0.5MG/DOS, (OZEMPIC, 0.25 OR 0.5 MG/DOSE,) 2 MG/1.5ML SOPN Inject 0.25 mg into the skin once a week 3 mL 0    zaleplon (SONATA) 10 MG capsule Take 1 capsule by mouth nightly for 14 days. 14 capsule 0    pravastatin (PRAVACHOL) 20 MG tablet TAKE 1 TABLET BY MOUTH EVERY DAY 90 tablet 1    Cholecalciferol (VITAMIN D) 50 MCG (2000 UT) CAPS capsule Take by mouth 25,000 monthly      blood glucose test strips (GLUCOSE METER TEST) strip 1 each by In Vitro route 2 times daily As needed. (Patient not taking: No sig reported) 300 each 3    Blood Glucose Monitoring Suppl ([employer] BLOOD GLUCOSE METER) w/Device KIT 1 each by Does not apply route in the morning and at bedtime (Patient not taking: No sig reported) 1 kit 0    XARELTO 20 MG TABS tablet Take 20 mg by mouth daily      cyclobenzaprine (FLEXERIL) 10 MG tablet TAKE 1 TABLET BY MOUTH 2 TIMES DAILY AS NEEDED FOR MUSCLE SPASMS. 180 tablet 1    dilTIAZem (CARDIZEM) 60 MG tablet Take 1 tablet by mouth in the morning and 1 tablet in the evening.  180 tablet 3    DULoxetine (CYMBALTA) 20 MG extended release capsule Take 20 mg by mouth daily      sodium bicarbonate 650 MG tablet Take 650 mg by mouth 4 times daily      allopurinol (ZYLOPRIM) 100 MG tablet TAKE 1 TABLET BY MOUTH EVERY DAY 90 tablet 3    simethicone (MYLICON) 80 MG chewable tablet Take 1 tablet by mouth 4 times daily as needed for Flatulence 180 tablet 3    hydrOXYzine (ATARAX) 50 MG tablet TAKE 1 TABLET BY MOUTH EVERY DAY AT NIGHT 90 tablet 3    pantoprazole (PROTONIX) 40 MG tablet Take 1 tablet by mouth 2 times daily (before meals) 60 tablet 1    sodium zirconium cyclosilicate (LOKELMA) 10 g PACK oral suspension Take 10 g by mouth daily      metoprolol (LOPRESSOR) 100 MG tablet Take 100 mg by mouth 2 times daily       ferrous sulfate (IRON 325) 325 (65 Fe) MG tablet Take 1 tablet by mouth daily (with breakfast) 180 tablet 1    CPAP Machine MISC use as directed      Respiratory Therapy Supplies (CARETOUCH CPAP & BIPAP HOSE) MISC Mask and Tubing      Melatonin 10 MG TABS Take 1 tablet by mouth nightly as needed      Multiple Vitamins-Minerals (THERAPEUTIC MULTIVITAMIN-MINERALS) tablet Take 1 tablet by mouth daily 90 tablet 1    vitamin C (ASCORBIC ACID) 500 MG tablet Take 500 mg by mouth daily      acetaminophen (TYLENOL) 500 MG tablet Take 2 tablets by mouth every 6 hours as needed for Pain 40 tablet 0    aspirin 81 MG tablet Take 81 mg by mouth nightly Stopped 12/14/2020       No current facility-administered medications for this encounter.   :     Comments:  Allergies: Allergies   Allergen Reactions    Ampicillin Swelling     Swelling of throat. Pcn [Penicillins] Swelling     Throat swelling. Tolerated cefepime during 8/31/20 admission. Sulfa Antibiotics      Other reaction(s): Unknown    Tape [Adhesive Tape] Other (See Comments)     CAUSES REDNESS. PAPER TAPE OK. A1C blood level - at goal < 7%   Lab Results   Component Value Date    LABA1C 10.5 11/15/2022    LABA1C 5.3 02/01/2021    LABA1C 6.0 12/18/2020     Lab Results   Component Value Date    LABMICR 3,217 (H) 12/19/2022    CREATININE 2.68 (H) 12/19/2022       Blood pressure ( 130/ 80)  Or less  BP Readings from Last 3 Encounters:   12/20/22 136/86   11/15/22 128/66   11/13/22 (!) 139/102        Cholesterol ( LDL under  100)   Lab Results   Component Value Date    LDLCHOLESTEROL      12/19/2022    LDLDIRECT 89 12/19/2022       Diabetes Self- Management Education Record    Participant Name: Cindi Jurado II  Referring Provider: TORREY Mckeon CNP   Assessment/Evaluation Ratings:  1=Needs Instruction   4=Demonstrates Understanding/Competency  2=Needs Review   NC=Not Covered    3=Comprehends Key Points  N/A=Not Applicable  Topics/Learning Objectives Pre-session Assess Date:  1-3-23BB Instr. Date Reinforce Date Post- session Eval Comments   Diabetes disease process & Treatment process: Define diabetes & pre-diabetes; Identify own type of diabetes; role of the pancreas; signs/symptoms; diagnostic criteria; prevention & treatment options; contributing factors.  1    1-3-23 BB dx with T2 about 10 yrs ago, Father had diabetes. Ezio Mario has had a kidney removed   Incorporating nutritional management into lifestyle: Describe effect of type, amount & timing of food on blood glucose; Describe basic meal planning techniques & current nutrition guideline   1        What to eat - Food groups, When to eat - timing of meals and snacks, and How much to eat - portions control. calories/ day   CHO choices/ meal   CHO choices/  day   grams of protein /day   gram of fat /day     Correctly read food labels & demonstrate CHO counting & portion control with personalized meal plan. Identify dining out strategies, & dietary sick day guidelines. 1    1-3-23 BB did Atkins eating plan in the past and likes it. Weighs himself daily d/t kidney. Has never been told that he has any dietary restrictions d/t kidney. Incorporating physical activity into lifestyle:   Verbalize effect of exercise on blood glucose levels; benefits of regular exercise; safety considerations; contraindications; maintenance of activity. 1    1-3-23 ELMO works at [employer] from [employer] until 4 am, is a  - does some lifting and walking. Also like to play golf, play with [employer] and home projects. Using medications safely:  Identify effects of diabetes medicines on blood glucose levels; List diabetes medication taken, action & side effects;    1       Insulin / Injectable - Appropriate injection sites; proper storage; supplies needed; proper technique; safe needle disposal guidelines. 1    1-3-23 BB took first dose of Ozempic yesterday (Mondays)   Monitoring blood glucose, interpreting and using results:  Identify recommended & personal blood glucose targets; importance of testing; testing supplies; HgbA1C target levels; Factors affecting blood glucose; Importance of logging blood glucose levels for pattern recognition; ketone testing; safe lancet disposal.   1    1-3-23 BB has a glucometer at home but out of lancets (I sent a request to PCP).  Got Freestyle Onur CGM and brought it to the appt today. Inserted into left arm. He was unable to download the BRISA in the building here - he will try again at home - showed Jerzy Barrera video and website   Prevention, detection & treatment of acute complications:  Identify symptoms of hyper & hypoglycemia, and prevention & treatment strategies. 1       Describe sick day guidelines & indications for  physician notification. Identify short term consequences of poor control. Disaster preparedness strategies    1       Prevention, detection & treatment of chronic complications:  Define the natural course of diabetes & describe the relationship of blood glucose levels to long term complications of diabetes. Identify preventative measures & standards of care. 1       Developing strategies to address psychosocial issues:  Describe feelings about living with diabetes; Describe how stress, depression & anxiety affect blood glucose; Identify coping strategies; Identify support needed & support network available. 1       Developing strategies to promote health/change behavior: Identify 7 self-care behaviors; Personal health risk factors; Benefits, challenges & strategies for behavioral change;    1         Individualized goal selection. My goal , to help me improve my health, I will:   Monitoring - use CGM      2. Healthy Eating - increase non-starchy veggies         Plan  Follow-up Appointments planned on 1-17-23    Instruction Method: [x]Lecture/Discussion  []Power Point Presentation  [x]Handouts  []Return Demonstration    Education Materials/Equipment Provided (VIA Mail for phone visits)  :    [x]Self-Management - Initial assessment - Enrolment in to ADA  Where do I Begin, Living with Type 2 diabetes ADA home support program and  handout on diabetes education classes.      []Understanding Diabetes session       Book How to Thrive: A guide for Your Journey with Diabetes ADA booklet -pages 4, 14-19, 22-23        View: Understanding Type 2 Diabetes from Animated Diabetes Patient [email or web address]    [] Healthy Eating and Meal planning  How to Thrive: A guide for Your journey with Diabetes - ADA booklet - pages 20- 21 & 42-43  Handouts: Smarter snacking, Lowdown on low - carb diets, Supermarket savvy, Ready, set, start counting, Reading a nutrition fact label, 7 ways to size up your servings    []   Medications and Prevention of Complications      Book How to Thrive: A guide for Your Journey with Diabetes - ADA booklet -  pages 6-7, 12-13, 24-27 & 28 -35  Handouts: Know your numbers, Vaccinations, Type 2 diabetes and the role of GLP- 1, How to care for your teeth and gums, Caring for your feet, How to pick the right shoes  Individualized Diabetes report card     []  Diet Follow Up- CHO counting and  planning for sick days, holiday, vacations   How to Thrive: A guide for Your journey with Diabetes - ADA booklet  - pages 43- 37  Diabetes Food hub www. diabetesfoodMatchMineb.org   Handouts: Sick day rules, Dining out guide, Diabetes and alcohol, Traveling with diabetes, Diabetes disaster preparedness plan, Holidays and special occasions                                                            []  Self care and goal review -  3 month follow -    Handouts: AADE7 Self care behaviors work sheets and personal goal setting worksheet review, DSME support options on line     []Self-Management  Gestational - RN class -Resource materials sent out : care booklet - \" Gestational Diabetes Mellitus ( GDM) toolkit form [place] gestational diabetes postpartum care learning collaborative 2019. \"Simple Guidelines for meal planning with gestational diabetes. SMBG sheets to fax back to MFM weekly. BD  healthy injection site selection and rotation with 6 mm insulin syringe and 4 mm pen needle. Gestational diabetes handout from [facility] 2016. Did you have gestational diabetes when you were pregnant?  Handout from [facility]  April 2014    []Self-Management Gestational - RD class - My Food Plan for Gestational diabetes    []Glucose Meter     []Insulin Kit     []Other      Encounter Type Date Start Time End Time Comments No Show Dates   Assessment 1-3-23BB   8:40am 9:40am   Face to face    Class 1 - Understanding diabetes         Class 2- Nutrition and diabetes          Class 3 - Preventing Complications        Class 4 -  In depth Nutrition and sick day care        Class 5 - 3 month follow up / goal reassessment        Gestational - RN         Gestational - RD        Individual MNT         Shared Med Appt         Yearly Follow-up        Meter Instrx      How to Measure Your Blood Sugar - [facility] Patient Education  [email or web address]    Insulin Instrx      []Pen  []Vial & Syringe   BD Diabetes Care: How to Inject Insulin with a Pen Needle  [email or web address]    Diabetes Care: How to Inject Insulin with a Syringe  [email or web address]       DSMS Support :   [] MNT      [] Annual update     [] Starting Fresh  adults living with diabetes or pre diabetes. [address] [place] [address] [phone] call for dates    []  Diabetes Group at  [place] [address] of [place] - Free 6 week diabetes education support   classes - use web site interest form found at  GoBeMe.pt - to enroll       []ADA  Where do I Begin, Living with Type 2 diabetes ADA home support program  Web site: diabetes. org/living    Call: 1800 DIABETES  e-mail: Corinne@[email or web address]. org     []  Internet web sites - ADAWeb site: diabetes. org and diabetesfoodhub.org      Post Education Referrals:      [] [address] information sheet and [address] , [address] [phone]      [] Dental care - [facility] of [facility]     [] [facility]) link  phone number - for information and referral to [facility] [place]  [facility], CARDIAC, WOUND, WEIGHT MANAGEMENT        []Other  PRAVIN SANDERS RN

## 2023-01-03 NOTE — LETTER
[facility]  [facility] [address]  [facility] [address]  Phone: [phone]         January 3, 2023    To TORREY Singh CNP  From: Consuelo Daniel RN    Patient: Constance Moore II   YOB: 1960   Date of Visit: 1/3/23     An initial assessment to determine diabetes education needs was completed. Education included:interpretation of lab results, blood sugar goals, complications of diabetes mellitus, hypoglycemia prevention and treatment, exercise, illness management, self-monitoring of blood glucose skills, nutrition and carbohydrate counting. Stone [place] Knows mentioned that he is out of lancets for his glucometer. He is going to start to use his CGM but I did instruct him that he should have a glucometer as back-up. Please send a perscription for lancets to [facility].  Thank you, José Miguel Ac    An ongoing plan was created to include: individual follow up    Thank you for the opportunity to provide Diabetes Self Management Education to your patient.

## 2023-01-13 DIAGNOSIS — F51.02 ADJUSTMENT INSOMNIA: ICD-10-CM

## 2023-01-13 RX ORDER — ZALEPLON 10 MG/1
10 CAPSULE ORAL NIGHTLY
Qty: 30 CAPSULE | Refills: 1 | Status: SHIPPED | OUTPATIENT
Start: 2023-01-13 | End: 2023-02-12

## 2023-01-16 DIAGNOSIS — F51.02 ADJUSTMENT INSOMNIA: ICD-10-CM

## 2023-01-16 DIAGNOSIS — G47.00 INSOMNIA, UNSPECIFIED TYPE: ICD-10-CM

## 2023-01-16 RX ORDER — HYDROXYZINE 50 MG/1
TABLET, FILM COATED ORAL
Qty: 90 TABLET | Refills: 3 | Status: SHIPPED | OUTPATIENT
Start: 2023-01-16

## 2023-01-16 RX ORDER — ZALEPLON 10 MG/1
CAPSULE ORAL
Qty: 14 CAPSULE | Refills: 0 | OUTPATIENT
Start: 2023-01-16

## 2023-01-16 RX ORDER — DULOXETIN HYDROCHLORIDE 20 MG/1
CAPSULE, DELAYED RELEASE ORAL
Qty: 90 CAPSULE | Refills: 3 | Status: SHIPPED | OUTPATIENT
Start: 2023-01-16

## 2023-01-17 ENCOUNTER — HOSPITAL ENCOUNTER (OUTPATIENT)
Dept: DIABETES SERVICES | Age: 63
Setting detail: THERAPIES SERIES
Discharge: HOME | End: 2023-01-17
Payer: COMMERCIAL

## 2023-01-17 PROCEDURE — G0108 DIAB MANAGE TRN  PER INDIV: HCPCS

## 2023-01-17 ASSESSMENT — PROBLEM AREAS IN DIABETES QUESTIONNAIRE (PAID)
FEELING THAT DIABETES IS TAKING UP TOO MUCH OF YOUR MENTAL AND PHYSICAL ENERGY EVERY DAY: 2
WORRYING ABOUT THE FUTURE AND THE POSSIBILITY OF SERIOUS COMPLICATIONS: 1
PAID-5 TOTAL SCORE: 4
COPING WITH COMPLICATIONS OF DIABETES: 0
FEELING DEPRESSED WHEN YOU THINK ABOUT LIVING WITH DIABETES: 0
FEELING SCARED WHEN YOU THINK ABOUT LIVING WITH DIABETES: 1

## 2023-01-17 NOTE — PROGRESS NOTES
Diabetes Self- Management Education Program Assessment -   Also see Diabetic Screening  Patient, Sánchez Thrasher,  here for diabetes self-management education  visit/ assessment. Today's visit was in an individual setting. MEDICAL HISTORY:  Past Medical History:   Diagnosis Date    Arthritis     BILATERAL KNEESand right shoulder    Back pain     Colostomy RUQ 09/23/2020    Frequent headaches     10/28/2020 PATIENT STATES EVERY OTHER DAY. Gout     History of atrial fibrillation     AFTER SURGERY ON 09/23/2020 WENT INTO A FIB. CONVERTED BACK TO NORMAL RHYTHM ON HIS OWN. CARDIOLOGIST DR Santiago Espinoza    History of blood transfusion     NO REACTION    [place] (hard of hearing)     HTN     Larissa Delgadillo CNP    Hyperkalemia     Hyperlipidemia     Incisional hernia     Kidney infection     Sepsis ([address]     post surgery. leak in bowel    Sleep apnea     USES CPAP MACHINE    T2DM     K. Troy Salas CNP. diet controlled    Tooth missing     RIGHT UPPER 2ND MOLAR    Under care of team 08/2021    Dr. Reny Mckeon [facility] [address]    Wears glasses     READING    Wellness examination 07/2021    Myna Phoenix CNP [place] [facility] [place]      Family History   Problem Relation Age of Onset    Stroke Maternal Grandmother     Stroke Maternal Grandfather     Other Mother         AORTIC BYPASS LED TO KIDNEY FAILURE    Kidney Disease Mother     Diabetes Father     Stroke Father      Ampicillin, Pcn [penicillins], Sulfa antibiotics, and Tape [adhesive tape]   Immunization History   Administered Date(s) Administered    Pneumococcal Polysaccharide ([id number]) 12/12/2016     Current Medications  Current Outpatient Medications   Medication Sig Dispense Refill    DULoxetine (CYMBALTA) 20 MG extended release capsule TAKE 1 CAPSULE BY MOUTH EVERY DAY 90 capsule 3    hydrOXYzine HCl (ATARAX) 50 MG tablet TAKE 1 TABLET BY MOUTH EVERY DAY AT NIGHT 90 tablet 3    zaleplon (SONATA) 10 MG capsule Take 1 capsule by mouth nightly for 30 days.  Max Daily Amount: 10 mg 30 capsule 1    Lancets MISC 1 each by Does not apply route 3 times daily 300 each 5    Continuous Blood Gluc Sensor (FREESTYLE RANJIT 14 DAY SENSOR) MISC 1 each by Does not apply route continuous 2 each 5    Semaglutide,0.25 or 0.5MG/DOS, (OZEMPIC, 0.25 OR 0.5 MG/DOSE,) 2 MG/1.5ML SOPN Inject 0.25 mg into the skin once a week 3 mL 0    pravastatin (PRAVACHOL) 20 MG tablet TAKE 1 TABLET BY MOUTH EVERY DAY 90 tablet 1    Cholecalciferol (VITAMIN D) 50 MCG (2000 UT) CAPS capsule Take by mouth 25,000 monthly      blood glucose test strips (GLUCOSE METER TEST) strip 1 each by In Vitro route 2 times daily As needed. (Patient not taking: No sig reported) 300 each 3    Blood Glucose Monitoring Suppl ([employer] BLOOD GLUCOSE METER) w/Device KIT 1 each by Does not apply route in the morning and at bedtime (Patient not taking: No sig reported) 1 kit 0    XARELTO 20 MG TABS tablet Take 20 mg by mouth daily      cyclobenzaprine (FLEXERIL) 10 MG tablet TAKE 1 TABLET BY MOUTH 2 TIMES DAILY AS NEEDED FOR MUSCLE SPASMS. 180 tablet 1    dilTIAZem (CARDIZEM) 60 MG tablet Take 1 tablet by mouth in the morning and 1 tablet in the evening.  180 tablet 3    sodium bicarbonate 650 MG tablet Take 650 mg by mouth 4 times daily      allopurinol (ZYLOPRIM) 100 MG tablet TAKE 1 TABLET BY MOUTH EVERY DAY 90 tablet 3    simethicone (MYLICON) 80 MG chewable tablet Take 1 tablet by mouth 4 times daily as needed for Flatulence 180 tablet 3    pantoprazole (PROTONIX) 40 MG tablet Take 1 tablet by mouth 2 times daily (before meals) 60 tablet 1    sodium zirconium cyclosilicate (LOKELMA) 10 g PACK oral suspension Take 10 g by mouth daily      metoprolol (LOPRESSOR) 100 MG tablet Take 100 mg by mouth 2 times daily       ferrous sulfate (IRON 325) 325 (65 Fe) MG tablet Take 1 tablet by mouth daily (with breakfast) 180 tablet 1    CPAP Machine MISC use as directed      Respiratory Therapy Supplies (CARETOUCH CPAP & BIPAP HOSE) MISC Mask and Tubing      Melatonin 10 MG TABS Take 1 tablet by mouth nightly as needed      Multiple Vitamins-Minerals (THERAPEUTIC MULTIVITAMIN-MINERALS) tablet Take 1 tablet by mouth daily 90 tablet 1    vitamin C (ASCORBIC ACID) 500 MG tablet Take 500 mg by mouth daily      acetaminophen (TYLENOL) 500 MG tablet Take 2 tablets by mouth every 6 hours as needed for Pain 40 tablet 0    aspirin 81 MG tablet Take 81 mg by mouth nightly Stopped 12/14/2020       No current facility-administered medications for this encounter.   :     Comments:  Allergies: Allergies   Allergen Reactions    Ampicillin Swelling     Swelling of throat. Pcn [Penicillins] Swelling     Throat swelling. Tolerated cefepime during 8/31/20 admission. Sulfa Antibiotics      Other reaction(s): Unknown    Tape [Adhesive Tape] Other (See Comments)     CAUSES REDNESS. PAPER TAPE OK. A1C blood level - at goal < 7%   Lab Results   Component Value Date    LABA1C 10.5 11/15/2022    LABA1C 5.3 02/01/2021    LABA1C 6.0 12/18/2020     Lab Results   Component Value Date    LABMICR 3,217 (H) 12/19/2022    CREATININE 2.68 (H) 12/19/2022       Blood pressure ( 130/ 80)  Or less  BP Readings from Last 3 Encounters:   12/20/22 136/86   11/15/22 128/66   11/13/22 (!) 139/102        Cholesterol ( LDL under  100)   Lab Results   Component Value Date    LDLCHOLESTEROL      12/19/2022    LDLDIRECT 89 12/19/2022       Diabetes Self- Management Education Record    Participant Name: Shy Allison II  Referring Provider: TORREY Claudio CNP   Assessment/Evaluation Ratings:  1=Needs Instruction   4=Demonstrates Understanding/Competency  2=Needs Review   NC=Not Covered    3=Comprehends Key Points  N/A=Not Applicable  Topics/Learning Objectives Pre-session Assess Date:  1-3-23BB Instr. Date Reinforce Date Post- session Eval Comments   Diabetes disease process & Treatment process: Define diabetes & pre-diabetes;  Identify own type of diabetes; role of the pancreas; signs/symptoms; diagnostic criteria; prevention & treatment options; contributing factors. 1 1-17-23BB   1-3-23 BB dx with T2 about 10 yrs ago, Father had diabetes. Vickie Andino has had a kidney removed   Incorporating nutritional management into lifestyle: Describe effect of type, amount & timing of food on blood glucose; Describe basic meal planning techniques & current nutrition guideline   1        What to eat - Food groups, When to eat - timing of meals and snacks, and How much to eat - portions control. calories/ day   CHO choices/ meal   CHO choices/  day   grams of protein /day   gram of fat /day     Correctly read food labels & demonstrate CHO counting & portion control with personalized meal plan. Identify dining out strategies, & dietary sick day guidelines. 1    1-3-23 BB did Atkins eating plan in the past and likes it. Weighs himself daily d/t kidney. Has never been told that he has any dietary restrictions d/t kidney. Incorporating physical activity into lifestyle:   Verbalize effect of exercise on blood glucose levels; benefits of regular exercise; safety considerations; contraindications; maintenance of activity. 1    1-3-23 BB works at [employer] from [employer] until 4 am, is a  - does some lifting and walking. Also like to play golf, play with grandkids and home projects. Using medications safely:  Identify effects of diabetes medicines on blood glucose levels; List diabetes medication taken, action & side effects;    1       Insulin / Injectable - Appropriate injection sites; proper storage; supplies needed; proper technique; safe needle disposal guidelines. 1    1-3-23 BB took first dose of Ozempic yesterday (Mondays)   Monitoring blood glucose, interpreting and using results:  Identify recommended & personal blood glucose targets; importance of testing; testing supplies; HgbA1C target levels; Factors affecting blood glucose;  Importance of logging blood glucose levels for pattern recognition; ketone testing; safe lancet disposal.   1 1-17-23BB   1-3-23 ELMO has a glucometer at home but out of lancets (I sent a request to PCP). Got Freestyle Onur CGM and brought it to the appt today. Inserted into left arm. He was unable to download the HELENE in the building here - he will try again at home - showed [facility] video and website  1-17-23 ELMO was able to get the [facility] helene, reviewed data, had a high on 311 after a sandwich and juice- used that as a teachable moment. Getting some readings in the 100s now. Does not have his next PCP appt scheduled yet - I encouraged him to make an appt  I emailed him an invite to [facility]  Also, he was able to  his lancets today so now he has the ability to use his glucometer again    [place] (likes to go by his middle name) brought his new [employer] - he was able to change it independently. He removed his old sensor and placed the new one on his right arm   Prevention, detection & treatment of acute complications:  Identify symptoms of hyper & hypoglycemia, and prevention & treatment strategies. 1 1-17-23BB      Describe sick day guidelines & indications for  physician notification. Identify short term consequences of poor control. Disaster preparedness strategies    1       Prevention, detection & treatment of chronic complications:  Define the natural course of diabetes & describe the relationship of blood glucose levels to long term complications of diabetes. Identify preventative measures & standards of care. 1       Developing strategies to address psychosocial issues:  Describe feelings about living with diabetes; Describe how stress, depression & anxiety affect blood glucose; Identify coping strategies; Identify support needed & support network available. 1 1-17-23BB 1-17-23 ELMO may bring his wife along with him to his next appt   Developing strategies to promote health/change behavior: Identify 7 self-care behaviors; Personal health risk factors;  Benefits, challenges & strategies for behavioral change;    1 1-17-23BB        Individualized goal selection. My goal , to help me improve my health, I will:   Monitoring - use CGM      2. Healthy Eating - increase non-starchy veggies         Plan  Follow-up Appointments planned with dietitian     Instruction Method: [x]Lecture/Discussion  []Power Point Presentation  [x]Handouts  []Return Demonstration    Education Materials/Equipment Provided (VIA Mail for phone visits)  :    [x]Self-Management - Initial assessment - Enrolment in to ADA  Where do I Begin, Living with Type 2 diabetes ADA home support program and  handout on diabetes education classes. []Understanding Diabetes session       Book How to Thrive: A guide for Your Journey with Diabetes ADA booklet -pages 4, 14-19, 22-23        View: Understanding Type 2 Diabetes from Animated Diabetes Patient [email or web address]    [] Healthy Eating and Meal planning  How to Thrive: A guide for Your journey with Diabetes - ADA booklet - pages 20- 21 & 42-43  Handouts: Smarter snacking, Lowdown on low - carb diets, Supermarket savvy, Ready, set, start counting, Reading a nutrition fact label, 7 ways to size up your servings    []   Medications and Prevention of Complications      Book How to Thrive: A guide for Your Journey with Diabetes - ADA booklet -  pages 6-7, 12-13, 24-27 & 28 -35  Handouts: Know your numbers, Vaccinations, Type 2 diabetes and the role of GLP- 1, How to care for your teeth and gums, Caring for your feet, How to pick the right shoes  Individualized Diabetes report card     []  Diet Follow Up- CHO counting and  planning for sick days, holiday, vacations   How to Thrive: A guide for Your journey with Diabetes - ADA booklet  - pages 43- 37  Diabetes Food hub www. diabetesfoodhub.org   Handouts: Sick day rules, Dining out guide, Diabetes and alcohol, Traveling with diabetes, Diabetes disaster preparedness plan, Holidays and special occasions []  Self care and goal review -  3 month follow -    Handouts: AADE7 Self care behaviors work sheets and personal goal setting worksheet review, DSME support options on line     []Self-Management  Gestational - RN class -Resource materials sent out : care booklet - \" Gestational Diabetes Mellitus ( GDM) toolkit form [place] gestational diabetes postpartum care learning collaborative 2019. \"Simple Guidelines for meal planning with gestational diabetes. SMBG sheets to fax back to M weekly. BD  healthy injection site selection and rotation with 6 mm insulin syringe and 4 mm pen needle. Gestational diabetes handout from [facility] 2016. Did you have gestational diabetes when you were pregnant? Handout from [facility]  April 2014    []Self-Management Gestational - RD class - My Food Plan for Gestational diabetes    []Glucose Meter     []Insulin Kit     []Other      Encounter Type Date Start Time End Time Comments No Show Dates   Assessment 1-3-23BB   8:40am 9:40am   Face to face    Class 1 - Understanding diabetes 1-17-23BB 3pm 4pm  Face to face    Class 2- Nutrition and diabetes          Class 3 - Preventing Complications        Class 4 -  In depth Nutrition and sick day care        Class 5 - 3 month follow up / goal reassessment        Gestational - RN         Gestational - RD        Individual MNT         Shared Med Appt         Yearly Follow-up        Meter Instrx      How to Measure Your Blood Sugar - [facility] Patient Education  [email or web address]    Insulin Instrx      []Pen  []Vial & Syringe   BD Diabetes Care: How to Inject Insulin with a Pen Needle  [email or web address]    Diabetes Care: How to Inject Insulin with a Syringe  [email or web address]       DSMS Support :   [] MNT      [] Annual update     [] Starting Fresh  adults living with diabetes or pre diabetes.  [address] [place] [address] Free -  REGISTRATION IS REQUIRED -  [phone] call for dates    []  Diabetes Group at  [facility] 79 6 week diabetes education support   classes - use web site interest form found at  [email or web address].[email or web address] - to enroll       []ADA  Where do I Begin, Living with Type 2 diabetes ADA home support program  Web site: diabetes. org/living    Call: 1800 DIABETES  e-mail: Saldaña@[email or web address] org     []  Internet web sites - ADAWeb site: diabetes. org and diabetesfoodhub.org      Post Education Referrals:      [] [facility] information sheet and [address] [facility] , 21       [] Dental care - [facility]     [] [facility] ([facility]) link  phone number - for information and referral to [facility] [place]  [facility]  [address], FOOT, CARDIAC, WOUND, WEIGHT MANAGEMENT        []Other  PRAVIN SANDERS RN

## 2023-01-25 ENCOUNTER — TELEPHONE (OUTPATIENT)
Dept: PHARMACY | Age: 63
End: 2023-01-25

## 2023-01-25 NOTE — TELEPHONE ENCOUNTER
Called pt regarding new referral issued 12/20/22 for Diabetes Medication Management from PCP Leodan Hopkins. Called patient and left voicemail to schedule initial appointment.

## 2023-01-27 ENCOUNTER — PATIENT MESSAGE (OUTPATIENT)
Dept: FAMILY MEDICINE CLINIC | Age: 63
End: 2023-01-27

## 2023-01-29 NOTE — TELEPHONE ENCOUNTER
From: Adrienne Quintanilla II  To: Sarah Lewis  Sent: 1/27/2023 2:27 PM EST  Subject: Refill    Tamiko Sanchez need refill on Ic zaleplon thanks ran

## 2023-01-30 ENCOUNTER — HOSPITAL ENCOUNTER (OUTPATIENT)
Dept: DIABETES SERVICES | Age: 63
Setting detail: THERAPIES SERIES
Discharge: HOME | End: 2023-01-30
Payer: COMMERCIAL

## 2023-01-30 DIAGNOSIS — R73.9 HYPERGLYCEMIA: Primary | ICD-10-CM

## 2023-01-30 DIAGNOSIS — E11.9 TYPE 2 DIABETES MELLITUS WITHOUT COMPLICATION, UNSPECIFIED WHETHER LONG TERM INSULIN USE (HCC): ICD-10-CM

## 2023-01-30 PROCEDURE — G0108 DIAB MANAGE TRN  PER INDIV: HCPCS

## 2023-01-30 NOTE — PROGRESS NOTES
Diabetes Self- Management Education Program Assessment -   Also see Diabetic Screening  Patient, Eddie Oropeza,  here for diabetes self-management education  visit/ assessment. Today's visit was in an individual setting. MEDICAL HISTORY:  Past Medical History:   Diagnosis Date    Arthritis     BILATERAL KNEESand right shoulder    Back pain     Colostomy RUQ 09/23/2020    Frequent headaches     10/28/2020 PATIENT STATES EVERY OTHER DAY. Gout     History of atrial fibrillation     AFTER SURGERY ON 09/23/2020 WENT INTO A FIB. CONVERTED BACK TO NORMAL RHYTHM ON HIS OWN. CARDIOLOGIST DR Naif Rodriguez    History of blood transfusion     NO REACTION    [place] (hard of hearing)     HTN     Shanna Collins CNP    Hyperkalemia     Hyperlipidemia     Incisional hernia     Kidney infection     Sepsis ([address]     post surgery. leak in bowel    Sleep apnea     USES CPAP MACHINE    T2DM     K. Arben Gracia CNP. diet controlled    Tooth missing     RIGHT UPPER 2ND MOLAR    Under care of team 08/2021    Dr. Lana Fu [facility] [address]    Wears glasses     READING    Wellness examination 07/2021    Vasquez Been CNP [facility]      Family History   Problem Relation Age of Onset    Stroke Maternal Grandmother     Stroke Maternal Grandfather     Other Mother         AORTIC BYPASS LED TO KIDNEY FAILURE    Kidney Disease Mother     Diabetes Father     Stroke Father      Ampicillin, Pcn [penicillins], Sulfa antibiotics, and Tape [adhesive tape]   Immunization History   Administered Date(s) Administered    Pneumococcal Polysaccharide ([id number]) 12/12/2016     Current Medications  Current Outpatient Medications   Medication Sig Dispense Refill    zaleplon (SONATA) 10 MG capsule Take 1 capsule by mouth nightly for 30 days.  Max Daily Amount: 10 mg 90 capsule 0    DULoxetine (CYMBALTA) 20 MG extended release capsule TAKE 1 CAPSULE BY MOUTH EVERY DAY 90 capsule 3    hydrOXYzine HCl (ATARAX) 50 MG tablet TAKE 1 TABLET BY MOUTH EVERY DAY AT NIGHT 90 tablet 3    Lancets MISC 1 each by Does not apply route 3 times daily 300 each 5    Continuous Blood Gluc Sensor (FREESTYLE RANJIT 14 DAY SENSOR) MISC 1 each by Does not apply route continuous 2 each 5    Semaglutide,0.25 or 0.5MG/DOS, (OZEMPIC, 0.25 OR 0.5 MG/DOSE,) 2 MG/1.5ML SOPN Inject 0.25 mg into the skin once a week 3 mL 0    pravastatin (PRAVACHOL) 20 MG tablet TAKE 1 TABLET BY MOUTH EVERY DAY 90 tablet 1    Cholecalciferol (VITAMIN D) 50 MCG (2000 UT) CAPS capsule Take by mouth 25,000 monthly      blood glucose test strips (GLUCOSE METER TEST) strip 1 each by In Vitro route 2 times daily As needed. (Patient not taking: No sig reported) 300 each 3    Blood Glucose Monitoring Suppl ([employer] BLOOD GLUCOSE METER) w/Device KIT 1 each by Does not apply route in the morning and at bedtime (Patient not taking: No sig reported) 1 kit 0    XARELTO 20 MG TABS tablet Take 20 mg by mouth daily      cyclobenzaprine (FLEXERIL) 10 MG tablet TAKE 1 TABLET BY MOUTH 2 TIMES DAILY AS NEEDED FOR MUSCLE SPASMS. 180 tablet 1    dilTIAZem (CARDIZEM) 60 MG tablet Take 1 tablet by mouth in the morning and 1 tablet in the evening.  180 tablet 3    sodium bicarbonate 650 MG tablet Take 650 mg by mouth 4 times daily      allopurinol (ZYLOPRIM) 100 MG tablet TAKE 1 TABLET BY MOUTH EVERY DAY 90 tablet 3    simethicone (MYLICON) 80 MG chewable tablet Take 1 tablet by mouth 4 times daily as needed for Flatulence 180 tablet 3    pantoprazole (PROTONIX) 40 MG tablet Take 1 tablet by mouth 2 times daily (before meals) 60 tablet 1    sodium zirconium cyclosilicate (LOKELMA) 10 g PACK oral suspension Take 10 g by mouth daily      metoprolol (LOPRESSOR) 100 MG tablet Take 100 mg by mouth 2 times daily       ferrous sulfate (IRON 325) 325 (65 Fe) MG tablet Take 1 tablet by mouth daily (with breakfast) 180 tablet 1    CPAP Machine MISC use as directed      Respiratory Therapy Supplies (CARETOUCH CPAP & BIPAP HOSE) MISC Mask and Tubing      Melatonin 10 MG TABS Take 1 tablet by mouth nightly as needed      Multiple Vitamins-Minerals (THERAPEUTIC MULTIVITAMIN-MINERALS) tablet Take 1 tablet by mouth daily 90 tablet 1    vitamin C (ASCORBIC ACID) 500 MG tablet Take 500 mg by mouth daily      acetaminophen (TYLENOL) 500 MG tablet Take 2 tablets by mouth every 6 hours as needed for Pain 40 tablet 0    aspirin 81 MG tablet Take 81 mg by mouth nightly Stopped 12/14/2020       No current facility-administered medications for this encounter.   :     Comments:  Allergies: Allergies   Allergen Reactions    Ampicillin Swelling     Swelling of throat. Pcn [Penicillins] Swelling     Throat swelling. Tolerated cefepime during 8/31/20 admission. Sulfa Antibiotics      Other reaction(s): Unknown    Tape [Adhesive Tape] Other (See Comments)     CAUSES REDNESS. PAPER TAPE OK. A1C blood level - at goal < 7%   Lab Results   Component Value Date    LABA1C 10.5 11/15/2022    LABA1C 5.3 02/01/2021    LABA1C 6.0 12/18/2020     Lab Results   Component Value Date    LABMICR 3,217 (H) 12/19/2022    CREATININE 2.68 (H) 12/19/2022       Blood pressure ( 130/ 80)  Or less  BP Readings from Last 3 Encounters:   12/20/22 136/86   11/15/22 128/66   11/13/22 (!) 139/102        Cholesterol ( LDL under  100)   Lab Results   Component Value Date    LDLCHOLESTEROL      12/19/2022    LDLDIRECT 89 12/19/2022       Diabetes Self- Management Education Record    Participant Name: Fidelina Wayne II  Referring Provider: TORREY Harper CNP   Assessment/Evaluation Ratings:  1=Needs Instruction   4=Demonstrates Understanding/Competency  2=Needs Review   NC=Not Covered    3=Comprehends Key Points  N/A=Not Applicable  Topics/Learning Objectives Pre-session Assess Date:  1-3-23BB Instr. Date Reinforce Date Post- session Eval Comments   Diabetes disease process & Treatment process: Define diabetes & pre-diabetes;  Identify own type of diabetes; role of the pancreas; signs/symptoms; diagnostic criteria; prevention & treatment options; contributing factors. 1 1-17-23BB   1-3-23 BB dx with T2 about 10 yrs ago, Father had diabetes. Belton Closs has had a kidney removed   Incorporating nutritional management into lifestyle: Describe effect of type, amount & timing of food on blood glucose; Describe basic meal planning techniques & current nutrition guideline   1 1/30/23 JW pt's wife cooks. Pt likes most foods except bananas. Has been following whitaker diet and has done ww in the past. Pt works 4p-4a 6 days a week. Eats 1st around 2pm then again at work 4:40ish then main meal 10:00pm, may snack between  and/or have meal when gets home before bed. Diet lacks regular intake of fruits/veges/dairy. Tends to eat more protein and fat. Takes food to work. 1/30/23 JW showed healthy eating video and explained basics of healthy eating encouraging weight watcher approach vs whitaker. Pt receptive to suggestions given. Suggested start of adding at least 2 veges and fruits per day (fruit as snack). Emphasized not going longer than 5 hours without eating and ideally every 3 hours. What to eat - Food groups, When to eat - timing of meals and snacks, and How much to eat - portions control. calories/ day   CHO choices/ meal   CHO choices/  day   grams of protein /day   gram of fat /day     Correctly read food labels & demonstrate CHO counting & portion control with personalized meal plan. Identify dining out strategies, & dietary sick day guidelines. 1 1/30/23 JW total vs type cho   1-3-23 BB did Atkins eating plan in the past and likes it. Weighs himself daily d/t kidney. Has never been told that he has any dietary restrictions d/t kidney. Incorporating physical activity into lifestyle:   Verbalize effect of exercise on blood glucose levels; benefits of regular exercise; safety considerations; contraindications; maintenance of activity.    1    1-3-23 BB works at [employer] from [employer] until 4 am, is a  - does some lifting and walking. Also like to play golf, play with grandkids and home projects. Using medications safely:  Identify effects of diabetes medicines on blood glucose levels; List diabetes medication taken, action & side effects;    1 1/30/23 ED   At one point took metformin but MD CLAROS/makayla'ed it d/t concern of risks. Insulin / Injectable - Appropriate injection sites; proper storage; supplies needed; proper technique; safe needle disposal guidelines. 1 1/30/23 ED encouraged talking w Dr. Nieves Strauss: increasing ozempic to .5 then 1.0 then 2.0   1-3-23 BB took first dose of Ozempic yesterday (Mondays)   Monitoring blood glucose, interpreting and using results:  Identify recommended & personal blood glucose targets; importance of testing; testing supplies; HgbA1C target levels; Factors affecting blood glucose; Importance of logging blood glucose levels for pattern recognition; ketone testing; safe lancet disposal.   1 1-17-23BB 1/30/23 ED bs improving but still elevated above target discussed progressive nature of DM. Also, encouraged him and wife to work on accepting invite for [facility]. 1-3-23 ELMO has a glucometer at home but out of lancets (I sent a request to PCP). Got Freestyle [employer] CGM and brought it to the appt today. Inserted into left arm. He was unable to download the HELENE in the building here - he will try again at home - showed [employer] video and website  1-17-23 ELMO was able to get the [employer] helene, reviewed data, had a high on 311 after a sandwich and juice- used that as a teachable moment. Getting some readings in the 100s now. Does not have his next PCP appt scheduled yet - I encouraged him to make an appt  I emailed him an invite to [facility]  Also, he was able to  his lancets today so now he has the ability to use his glucometer again    Erica (likes to go by his middle name) brought his new [employer] - he was able to change it independently.  He removed his old sensor and placed the new one on his right arm   Prevention, detection & treatment of acute complications:  Identify symptoms of hyper & hypoglycemia, and prevention & treatment strategies. 1 1-17-23BB      Describe sick day guidelines & indications for  physician notification. Identify short term consequences of poor control. Disaster preparedness strategies    1       Prevention, detection & treatment of chronic complications:  Define the natural course of diabetes & describe the relationship of blood glucose levels to long term complications of diabetes. Identify preventative measures & standards of care. 1       Developing strategies to address psychosocial issues:  Describe feelings about living with diabetes; Describe how stress, depression & anxiety affect blood glucose; Identify coping strategies; Identify support needed & support network available. 1 1-17-23BB 1-17-23 BB may bring his wife along with him to his next appt  1/30/23 JW pt has positive attitude, wants to lose weight (<250#) and improve bs. Developing strategies to promote health/change behavior: Identify 7 self-care behaviors; Personal health risk factors; Benefits, challenges & strategies for behavioral change;    1 1-17-23BB        Individualized goal selection. My goal , to help me improve my health, I will:   Monitoring - use CGM      2. Healthy Eating - increase non-starchy veggies         Plan  Follow-up Appointments planned with dietitian     Instruction Method: [x]Lecture/Discussion  []Power Point Presentation  [x]Handouts  []Return Demonstration    Education Materials/Equipment Provided (VIA Mail for phone visits)  :    [x]Self-Management - Initial assessment - Enrolment in to ADA  Where do I Begin, Living with Type 2 diabetes ADA home support program and  handout on diabetes education classes.      []Understanding Diabetes session       Book How to Thrive: A guide for Your Journey with Diabetes ADA booklet -pages 4, 14-19, 22-23 View: Understanding Type 2 Diabetes from Animated Diabetes Patient [email or web address]    [x] Healthy Eating and Meal planning1/30/23 JW  How to Thrive: A guide for Your journey with Diabetes - ADA booklet - pages 20- 21 & 42-43  Handouts: Smarter snacking, Lowdown on low - carb diets, Supermarket savvy, Ready, set, start counting, Reading a nutrition fact label, 7 ways to size up your servings    []   Medications and Prevention of Complications      Book How to Thrive: A guide for Your Journey with Diabetes - ADA booklet -  pages 6-7, 12-13, 24-27 & 28 -35  Handouts: Know your numbers, Vaccinations, Type 2 diabetes and the role of GLP- 1, How to care for your teeth and gums, Caring for your feet, How to pick the right shoes  Individualized Diabetes report card     []  Diet Follow Up- CHO counting and  planning for sick days, holiday, vacations   How to Thrive: A guide for Your journey with Diabetes - ADA booklet  - pages 43- 37  Diabetes Food hub www. diabetesfoodhub.org   Handouts: Sick day rules, Dining out guide, Diabetes and alcohol, Traveling with diabetes, Diabetes disaster preparedness plan, Holidays and special occasions                                                            []  Self care and goal review -  3 month follow -    Handouts: AADE7 Self care behaviors work sheets and personal goal setting worksheet review, DSME support options on line     []Self-Management  Gestational - RN class -Resource materials sent out : care booklet - \" Gestational Diabetes Mellitus ( GDM) toolkit form [place] gestational diabetes postpartum care learning collaborative 2019. \"Simple Guidelines for meal planning with gestational diabetes. SMBG sheets to fax back to M weekly. BD  healthy injection site selection and rotation with 6 mm insulin syringe and 4 mm pen needle. Gestational diabetes handout from [facility] 2016. Did you have gestational diabetes when you were pregnant?  Handout from [facility] April 2014    []Self-Management Gestational - RD class - My Food Plan for Gestational diabetes    []Glucose Meter     []Insulin Kit     []Other      Encounter Type Date Start Time End Time Comments No Show Dates   Assessment 1-3-23BB   8:40am 9:40am   Face to face    Class 1 - Understanding diabetes 1-17-23BB 3pm 4pm  Face to face    Class 2- Nutrition and diabetes    1/30/23 JW 3pm 4pm Face to face    Class 3 - Preventing Complications        Class 4 -  In depth Nutrition and sick day care        Class 5 - 3 month follow up / goal reassessment        Gestational - RN         Gestational - RD        Individual MNT         Shared Med Appt         Yearly Follow-up        Meter Instrx      How to Measure Your Blood Sugar - [facility] Patient Education  [email or web address]    Insulin Instrx      []Pen  []Vial & Syringe   BD Diabetes Care: How to Inject Insulin with a Pen Needle  [email or web address]"[email or web address]"u. [email or web address]    Diabetes Care: How to Inject Insulin with a Syringe  [email or web address]"[employer]"u. [email or web address]       DSMS Support :   [] MNT      [] Annual update     [] Starting Fresh  adults living with diabetes or pre diabetes. [address] [place] [address] [phone] call for dates    []  Diabetes Group at  [address] [facility]illis - Free 6 week diabetes education support   classes - use web site interest form found at  Assurely.pt - to enroll       []ADA  Where do I Begin, Living with Type 2 diabetes ADA home support program  Web site: diabetes. org/living    Call: 1800 DIABETES  e-mail: Olivia@[email or web address]. org     []  Internet web sites - ADAWeb site: diabetes. org and diabetesfoodhub.org      Post Education Referrals:      [] [place] Tobacco Quit information sheet and [address]       [] Dental care - [facility]     [] [facility] ([facility]) link  phone number - for information and referral to [facility]  Clinically  [facility] - EYE, FOOT, CARDIAC, WOUND, WEIGHT MANAGEMENT        []Other  Chiki Olivo, FEMI, LD

## 2023-02-10 NOTE — TELEPHONE ENCOUNTER
LVM to schedule appt - 2nd attempt. Pt also sees [facility] diabetic ed. PCP intended for him to see us for med adjustment as well as continue classes at [facility].

## 2023-02-13 ENCOUNTER — HOSPITAL ENCOUNTER (OUTPATIENT)
Dept: DIABETES SERVICES | Age: 63
Setting detail: THERAPIES SERIES
Discharge: HOME | End: 2023-02-13
Payer: COMMERCIAL

## 2023-02-13 PROCEDURE — G0108 DIAB MANAGE TRN  PER INDIV: HCPCS

## 2023-02-13 NOTE — PROGRESS NOTES
Diabetes Self- Management Education Program Assessment -   Also see Diabetic Screening  Patient, Edwin Vázquez,  here for diabetes self-management education  visit/ assessment. Today's visit was in an individual setting. MEDICAL HISTORY:  Past Medical History:   Diagnosis Date    Arthritis     BILATERAL KNEESand right shoulder    Back pain     Colostomy RUQ 09/23/2020    Frequent headaches     10/28/2020 PATIENT STATES EVERY OTHER DAY. Gout     History of atrial fibrillation     AFTER SURGERY ON 09/23/2020 WENT INTO A FIB. CONVERTED BACK TO NORMAL RHYTHM ON HIS OWN. CARDIOLOGIST DR Peggy Hernandez    History of blood transfusion     NO REACTION    [place] (hard of hearing)     HTN     Arlyssheeba Henson CNP    Hyperkalemia     Hyperlipidemia     Incisional hernia     Kidney infection     Sepsis ([address]     post surgery. leak in bowel    Sleep apnea     USES CPAP MACHINE    T2DM     K. Mushtaq Vizcarra CNP. diet controlled    Tooth missing     RIGHT UPPER 2ND MOLAR    Under care of team 08/2021    Dr. Kandy Moore [facility] [address]    Wears glasses     READING    Wellness examination 07/2021    Josh Lis CNP [facility]      Family History   Problem Relation Age of Onset    Stroke Maternal Grandmother     Stroke Maternal Grandfather     Other Mother         AORTIC BYPASS LED TO KIDNEY FAILURE    Kidney Disease Mother     Diabetes Father     Stroke Father      Ampicillin, Pcn [penicillins], Sulfa antibiotics, and Tape [adhesive tape]   Immunization History   Administered Date(s) Administered    Pneumococcal Polysaccharide ([id number]) 12/12/2016     Current Medications  Current Outpatient Medications   Medication Sig Dispense Refill    Semaglutide,0.25 or 0.5MG/DOS, (OZEMPIC, 0.25 OR 0.5 MG/DOSE,) 2 MG/1.5ML SOPN Inject 0.5 mg into the skin once a week 1.5 mL 0    zaleplon (SONATA) 10 MG capsule Take 1 capsule by mouth nightly for 30 days.  Max Daily Amount: 10 mg 90 capsule 0    DULoxetine (CYMBALTA) 20 MG extended release capsule TAKE 1 CAPSULE BY MOUTH EVERY DAY 90 capsule 3    hydrOXYzine HCl (ATARAX) 50 MG tablet TAKE 1 TABLET BY MOUTH EVERY DAY AT NIGHT 90 tablet 3    Lancets MISC 1 each by Does not apply route 3 times daily 300 each 5    Continuous Blood Gluc Sensor (FREESTYLE RANJIT 14 DAY SENSOR) MISC 1 each by Does not apply route continuous 2 each 5    pravastatin (PRAVACHOL) 20 MG tablet TAKE 1 TABLET BY MOUTH EVERY DAY 90 tablet 1    Cholecalciferol (VITAMIN D) 50 MCG (2000 UT) CAPS capsule Take by mouth 25,000 monthly      blood glucose test strips (GLUCOSE METER TEST) strip 1 each by In Vitro route 2 times daily As needed. (Patient not taking: No sig reported) 300 each 3    Blood Glucose Monitoring Suppl ([employer] BLOOD GLUCOSE METER) w/Device KIT 1 each by Does not apply route in the morning and at bedtime (Patient not taking: No sig reported) 1 kit 0    XARELTO 20 MG TABS tablet Take 20 mg by mouth daily      cyclobenzaprine (FLEXERIL) 10 MG tablet TAKE 1 TABLET BY MOUTH 2 TIMES DAILY AS NEEDED FOR MUSCLE SPASMS. 180 tablet 1    dilTIAZem (CARDIZEM) 60 MG tablet Take 1 tablet by mouth in the morning and 1 tablet in the evening.  180 tablet 3    sodium bicarbonate 650 MG tablet Take 650 mg by mouth 4 times daily      allopurinol (ZYLOPRIM) 100 MG tablet TAKE 1 TABLET BY MOUTH EVERY DAY 90 tablet 3    simethicone (MYLICON) 80 MG chewable tablet Take 1 tablet by mouth 4 times daily as needed for Flatulence 180 tablet 3    pantoprazole (PROTONIX) 40 MG tablet Take 1 tablet by mouth 2 times daily (before meals) 60 tablet 1    sodium zirconium cyclosilicate (LOKELMA) 10 g PACK oral suspension Take 10 g by mouth daily      metoprolol (LOPRESSOR) 100 MG tablet Take 100 mg by mouth 2 times daily       ferrous sulfate (IRON 325) 325 (65 Fe) MG tablet Take 1 tablet by mouth daily (with breakfast) 180 tablet 1    CPAP Machine MISC use as directed      Respiratory Therapy Supplies (CARETOUCH CPAP & BIPAP HOSE) MISC Mask and Tubing      Melatonin 10 MG TABS Take 1 tablet by mouth nightly as needed      Multiple Vitamins-Minerals (THERAPEUTIC MULTIVITAMIN-MINERALS) tablet Take 1 tablet by mouth daily 90 tablet 1    vitamin C (ASCORBIC ACID) 500 MG tablet Take 500 mg by mouth daily      acetaminophen (TYLENOL) 500 MG tablet Take 2 tablets by mouth every 6 hours as needed for Pain 40 tablet 0    aspirin 81 MG tablet Take 81 mg by mouth nightly Stopped 12/14/2020       No current facility-administered medications for this encounter.   :     Comments:  Allergies: Allergies   Allergen Reactions    Ampicillin Swelling     Swelling of throat. Pcn [Penicillins] Swelling     Throat swelling. Tolerated cefepime during 8/31/20 admission. Sulfa Antibiotics      Other reaction(s): Unknown    Tape [Adhesive Tape] Other (See Comments)     CAUSES REDNESS. PAPER TAPE OK. A1C blood level - at goal < 7%   Lab Results   Component Value Date    LABA1C 10.5 11/15/2022    LABA1C 5.3 02/01/2021    LABA1C 6.0 12/18/2020     Lab Results   Component Value Date    LABMICR 3,217 (H) 12/19/2022    CREATININE 2.68 (H) 12/19/2022       Blood pressure ( 130/ 80)  Or less  BP Readings from Last 3 Encounters:   12/20/22 136/86   11/15/22 128/66   11/13/22 (!) 139/102        Cholesterol ( LDL under  100)   Lab Results   Component Value Date    LDLCHOLESTEROL      12/19/2022    LDLDIRECT 89 12/19/2022       Diabetes Self- Management Education Record    Participant Name: Astrid King II  Referring Provider: TORREY Connors CNP   Assessment/Evaluation Ratings:  1=Needs Instruction   4=Demonstrates Understanding/Competency  2=Needs Review   NC=Not Covered    3=Comprehends Key Points  N/A=Not Applicable  Topics/Learning Objectives Pre-session Assess Date:  1-3-23BB Instr. Date Reinforce Date Post- session Eval Comments   Diabetes disease process & Treatment process: Define diabetes & pre-diabetes;  Identify own type of diabetes; role of the pancreas; signs/symptoms; diagnostic criteria; prevention & treatment options; contributing factors. 1 1-17-23BB   1-3-23 ELMO dx with T2 about 10 yrs ago, Father had diabetes. Samantha Monroy has had a kidney removed   Incorporating nutritional management into lifestyle: Describe effect of type, amount & timing of food on blood glucose; Describe basic meal planning techniques & current nutrition guideline   1 1/30/23 JW pt's wife cooks. Pt likes most foods except bananas. Has been following whitaker diet and has done ww in the past. Pt works 4p-4a 6 days a week. Eats 1st around 2pm then again at work 4:40ish then main meal 10:00pm, may snack between  and/or have meal when gets home before bed. Diet lacks regular intake of fruits/veges/dairy. Tends to eat more protein and fat. Takes food to work. 1/30/23 JW showed healthy eating video and explained basics of healthy eating encouraging weight watcher approach vs whitaker. Pt receptive to suggestions given. Suggested start of adding at least 2 veges and fruits per day (fruit as snack). Emphasized not going longer than 5 hours without eating and ideally every 3 hours. What to eat - Food groups, When to eat - timing of meals and snacks, and How much to eat - portions control. calories/ day   CHO choices/ meal   CHO choices/  day   grams of protein /day   gram of fat /day     Correctly read food labels & demonstrate CHO counting & portion control with personalized meal plan. Identify dining out strategies, & dietary sick day guidelines. 1 1/30/23 ED total vs type cho   1-3-23 BB did Atkins eating plan in the past and likes it. Weighs himself daily d/t kidney. Has never been told that he has any dietary restrictions d/t kidney. Incorporating physical activity into lifestyle:   Verbalize effect of exercise on blood glucose levels; benefits of regular exercise; safety considerations; contraindications; maintenance of activity.    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 Also like to play golf, play with grandkids and home projects. Using medications safely:  Identify effects of diabetes medicines on blood glucose levels; List diabetes medication taken, action & side effects;    1 1/30/23 JW   At one point took metformin but MD CLAROS/makayla'ed it d/t concern of risks. Insulin / Injectable - Appropriate injection sites; proper storage; supplies needed; proper technique; safe needle disposal guidelines. 1 1/30/23 JW encouraged talking w Dr. Oj Purdy: increasing ozempic to .5 then 1.0 then 2.0   1-3-23 BB took first dose of Ozempic yesterday (Mondays)  2-13-23 BB Ozempic dose has been adjusted up. Has not been to med management yet but they have called him and attempted scheduling. We discussed role of pharmacist - he plans to call Radha duvall back tomorrow    Monitoring blood glucose, interpreting and using results:  Identify recommended & personal blood glucose targets; importance of testing; testing supplies; HgbA1C target levels; Factors affecting blood glucose; Importance of logging blood glucose levels for pattern recognition; ketone testing; safe lancet disposal.   1 1-17-23BB 1/30/23 JW bs improving but still elevated above target discussed progressive nature of DM. Also, encouraged him and wife to work on accepting invite for [employer]. 1-3-23 ELMO has a glucometer at home but out of lancets (I sent a request to PCP). Got Freestyle Onur CGM and brought it to the appt today. Inserted into left arm. He was unable to download the HELENE in the building here - he will try again at home - showed [employer]elli[employer] video and website  1-17-23 ELMO was able to get the Intelliworks helene, reviewed data, had a high on 311 after a sandwich and juice- used that as a teachable moment. Getting some readings in the 100s now.  Does not have his next PCP appt scheduled yet - I encouraged him to make an appt  I emailed him an invite to [facility]  Also, he was able to  his lancets today so now he has the ability to use his glucometer again    Erica (likes to go by his middle name) brought his new [employer] - he was able to change it independently. He removed his old sensor and placed the new one on his right arm    2-13-23 BB reviewed Dexcom data on his phone that acts as his reader. TIR = 4%, high 53%, very high = 43%. If not good improvement with pharmacist - he agrees to call PCP   Prevention, detection & treatment of acute complications:  Identify symptoms of hyper & hypoglycemia, and prevention & treatment strategies. 1 1-17-23BB      Describe sick day guidelines & indications for  physician notification. Identify short term consequences of poor control. Disaster preparedness strategies    1       Prevention, detection & treatment of chronic complications:  Define the natural course of diabetes & describe the relationship of blood glucose levels to long term complications of diabetes. Identify preventative measures & standards of care. 1 2-13-23BB 2-13-23 BB on BP and chol med   Developing strategies to address psychosocial issues:  Describe feelings about living with diabetes; Describe how stress, depression & anxiety affect blood glucose; Identify coping strategies; Identify support needed & support network available. 1 1-17-23BB 1-17-23 BB may bring his wife along with him to his next appt  1/30/23 JW pt has positive attitude, wants to lose weight (<250#) and improve bs. Developing strategies to promote health/change behavior: Identify 7 self-care behaviors; Personal health risk factors; Benefits, challenges & strategies for behavioral change;    1 1-17-23BB        Individualized goal selection. My goal , to help me improve my health, I will:   Monitoring - use CGM      2.  Healthy Eating - increase non-starchy veggies         Plan  Follow-up Appointments planned with dietitian for 2nd session with her    Instruction Method: [x]Lecture/Discussion []Power Point Presentation  [x]Handouts  []Return Demonstration    Education Materials/Equipment Provided (VIA Mail for phone visits)  :    [x]Self-Management - Initial assessment - Enrolment in to ADA  Where do I Begin, Living with Type 2 diabetes ADA home support program and  handout on diabetes education classes. []Understanding Diabetes session       Book How to Thrive: A guide for Your Journey with Diabetes ADA booklet -pages 4, 14-19, 22-23        View: Understanding Type 2 Diabetes from Animated Diabetes Patient [email or web address]    [x] Healthy Eating and Meal planning1/30/23 JW  How to Thrive: A guide for Your journey with Diabetes - [place] booklet - pages 20- 21 & 42-43  Handouts: Smarter snacking, Lowdown on low - carb diets, Supermarket savvy, Ready, set, start counting, Reading a nutrition fact label, 7 ways to size up your servings    [x]   Medications and Prevention of Complications      Book How to Thrive: A guide for Your Journey with Diabetes - [place] booklet -  pages 6-7, 12-13, 24-27 & 28 -35  Handouts: Know your numbers, Vaccinations, Type 2 diabetes and the role of GLP- 1, How to care for your teeth and gums, Caring for your feet, How to pick the right shoes  Individualized Diabetes report card     []  Diet Follow Up- CHO counting and  planning for sick days, holiday, vacations   How to Thrive: A guide for Your journey with Diabetes - [place] booklet  - pages 43- 37  Diabetes Food hub www. diabetesfoodhub.org   Handouts: Sick day rules, Dining out guide, Diabetes and alcohol, Traveling with diabetes, Diabetes disaster preparedness plan, Holidays and special occasions                                                            []  Self care and goal review -  3 month follow -    Handouts: AADE7 Self care behaviors work sheets and personal goal setting worksheet review, DSME support options on line     []Self-Management  Gestational - RN class -Resource materials sent out : care booklet - \" Gestational Diabetes Mellitus ( GDM) toolkit form [place] gestational diabetes postpartum care learning collaborative 2019. \"Simple Guidelines for meal planning with gestational diabetes. SMBG sheets to fax back to M weekly. BD  healthy injection site selection and rotation with 6 mm insulin syringe and 4 mm pen needle. Gestational diabetes handout from [facility] 2016. Did you have gestational diabetes when you were pregnant? Handout from [facility]  April 2014    []Self-Management Gestational - RD class - My Food Plan for Gestational diabetes    []Glucose Meter     []Insulin Kit     []Other      Encounter Type Date Start Time End Time Comments No Show Dates   Assessment 1-3-23BB   8:40am 9:40am   Face to face    Class 1 - Understanding diabetes 1-17-23BB 3pm 4pm  Face to face    Class 2- Nutrition and diabetes    1/30/23 JW 3pm 4pm Face to face    Class 3 - Preventing Complications 2-22-78LW 3pm 4pm Face to face    Class 4 -  In depth Nutrition and sick day care        Class 5 - 3 month follow up / goal reassessment        Gestational - RN         Gestational - RD        Individual MNT         Shared Med Appt         Yearly Follow-up        Meter Instrx      How to Measure Your Blood Sugar - [facility] Patient Education  [email or web address]    Insulin Instrx      []Pen  []Vial & Syringe   BD Diabetes Care: How to Inject Insulin with a Pen Needle  [email or web address]    Diabetes Care: How to Inject Insulin with a Syringe  [email or web address]       DSMS Support :   [] MNT      [] Annual update     [] Starting Fresh  adults living with diabetes or pre diabetes.  [address] [place] [address] [phone] call for dates    []  Diabetes Group at  [address] [facility] - Free 6 week diabetes education support   classes - use web site interest form found at  [email or web address].[email or web address] - to enroll []ADA  Where do I Begin, Living with Type 2 diabetes ADA home support program  Web site: diabetes. org/living    Call: 1800 DIABETES  e-mail: Telma@Appwiz. org     []  Internet web sites - ADAWeb site: diabetes. org and diabetesfoodhub.org      Post Education Referrals:      [] [place] Tobacco Quit information sheet and [address]       [] Dental care - [facility]     [] [facility] ([facility]) link  phone number - for information and referral to [address] [place], [place] [address] [facility]  [address] [facility], FOOT, CARDIAC, WOUND, WEIGHT MANAGEMENT        []Other  PRAVIN SANDERS RN           Diabetic Report Card for [place]    Hemoglobin A1C:   Your Hemoglobin A1C values should be less than 7. If these are greater than 7, you have a higher chance of having eye, heart, and kidney problems in the future. Your most recent Hemoglobin A1C values were:   Lab Results   Component Value Date    LABA1C 10.5 11/15/2022    LABA1C 5.3 02/01/2021    LABA1C 6.0 12/18/2020          Blood Pressure: Your blood pressure values should be less than 140/90. Your most recent blood pressure readings were:   BP Readings from Last 3 Encounters:   12/20/22 136/86   11/15/22 128/66   11/13/22 (!) 139/102         Cholesterol: Your LDL Cholesterol (bad cholesterol) values should be less than 100. Lab Results   Component Value Date    LDLCHOLESTEROL      12/19/2022    LDLDIRECT 89 12/19/2022         Urine Protein:  Your urine protein values should be less than  0- 19. If they are consistently above this value, your chance of kidney problems increases yearly. Lab Results   Component Value Date    LABMICR 3,217 (H) 12/19/2022       Weight History:   Maintaining a healthy weight helps control your diabetes. Talking with your health care provider to achieve and maintain a healthy weight.  IF you are having difficulty achieving your weight goals, [address] [place] [address] [facility] may be able to help, call [phone] for more information.   Wt Readings from Last 3 Encounters:   12/20/22 269 lb 9.6 oz (122.3 kg)   12/15/22 279 lb (126.6 kg)   11/15/22 279 lb (126.6 kg)

## 2023-02-17 ENCOUNTER — HOSPITAL ENCOUNTER (OUTPATIENT)
Dept: PHARMACY | Age: 63
Setting detail: THERAPIES SERIES
Discharge: HOME | End: 2023-02-17
Payer: COMMERCIAL

## 2023-02-17 VITALS
BODY MASS INDEX: 38.87 KG/M2 | WEIGHT: 278.7 LBS | SYSTOLIC BLOOD PRESSURE: 133 MMHG | OXYGEN SATURATION: 100 % | HEART RATE: 92 BPM | DIASTOLIC BLOOD PRESSURE: 90 MMHG | TEMPERATURE: 97.5 F

## 2023-02-17 LAB — HBA1C MFR BLD: 10 %

## 2023-02-17 PROCEDURE — 99212 OFFICE O/P EST SF 10 MIN: CPT

## 2023-02-17 RX ORDER — FUROSEMIDE 40 MG/1
40 TABLET ORAL 2 TIMES DAILY
COMMUNITY

## 2023-02-17 RX ORDER — MULTIVIT WITH MINERALS/LUTEIN
1000 TABLET ORAL DAILY
COMMUNITY

## 2023-02-17 NOTE — PROGRESS NOTES
Diabetic Medication Management Program  [facility] [place] - INPATIENT    [address]. [facility], [address] [facility]  Phone: [phone]  Fax: [phone]    NAME: Don Hay II  MEDICAL RECORD NUMBER:  [id number]  AGE: 70 y.o. GENDER: male  : 1960  EPISODE DATE:  2023       Mr. Denton was referred to [facility] by Yaquelin Gurrola CNP. Patient acknowledges working in consult agreement with clinical pharmacist and this provider. Goals per referral:   Fasting blood glucose: < 130  Peak postprandial glucose: < 180  A1C: < 7      SUBJECTIVE     Mr. Rachel Ferris is a 70 y.o. male here for the Diabetes Service for self-management education, medication review including over the counter medications and herbal products, overall wellbeing assessment, transition of care and any needed adjustments with updates and recommendations communicated to the referring physician. Patient Findings: Patient states he has been borderline diabetes and has been off medications for months now. Recently he was started on Ozempic 0.25 mg for 5 week and now he is on 0.5 mg as of this Monday ().    []  Missed doses   []  Emergency Room Visit or Hospitalization    [x]  Medication changes   [x]  Diet changes   []  Acute illness   []  Activity changes      Symptoms of hypoglycemia   [x]  None    []  Shakiness    []  Lightheadedness or dizziness   []  Confusion      []  Sweating   []  Other       Symptoms of hyperglycemia   [x]  None   []  Frequent urination    []  Increased thirst   []  Other    Medication adverse reactions   [x]  None   []  Diarrhea / Nausea / Vomiting / Constipation / flatulence   []  Hypertension   []  Peripheral edema     []  Signs of infection   []  Headache   []  Vision changes   []  Increased cholesterol    []  Weight gain   []  Change in renal function   []  Increased potassium  []  Other     OBJECTIVE     PMHx:    Past Medical History:   Diagnosis Date Arthritis     BILATERAL KNEESand right shoulder    Back pain     Colostomy RUQ 09/23/2020    Frequent headaches     10/28/2020 PATIENT STATES EVERY OTHER DAY. Gout     History of atrial fibrillation     AFTER SURGERY ON 09/23/2020 WENT INTO A FIB. CONVERTED BACK TO NORMAL RHYTHM ON HIS OWN. CARDIOLOGIST DR Guru Wilcox    History of blood transfusion     NO REACTION    [place] (hard of hearing)     HTN     Vianey Martell CNP    Hyperkalemia     Hyperlipidemia     Incisional hernia     Kidney infection     Sepsis ([address]     post surgery. leak in bowel    Sleep apnea     USES CPAP MACHINE    T2DM     K. Jes Guardado CNP. diet controlled    Tooth missing     RIGHT UPPER 2ND MOLAR    Under care of team 08/2021    Dr. Bradshaw [facility] [address]    Wears glasses     READING    Wellness examination 07/2021    Gold Fernandezor and Antonella Sotelo        Social History:    Social History     Tobacco Use    Smoking status: Never    Smokeless tobacco: Former     Types: Chew     Quit date: 1980   Substance Use Topics    Alcohol use: Yes     Alcohol/week: 2.0 standard drinks     Types: 2 Cans of beer per week     Comment: socially        Pertinent Labs:    Recent A1c: 10.0%    Lab Results   Component Value Date    LABA1C 10.0 02/17/2023    LABA1C 10.5 11/15/2022    LABA1C 5.3 02/01/2021     Lab Results   Component Value Date    CHOL 197 12/19/2022    TRIG 495 (H) 12/19/2022    HDL 31 (L) 12/19/2022     Lab Results   Component Value Date    CREATININE 2.68 (H) 12/19/2022    BUN 47 (H) 12/19/2022     (L) 12/19/2022    K 5.9 (H) 12/19/2022    CL 94 (L) 12/19/2022    CO2 17 (L) 12/19/2022     Lab Results   Component Value Date/Time    ALT 41 12/19/2022 07:45 AM       Weight:  Wt Readings from Last 3 Encounters:   02/17/23 278 lb 11.2 oz (126.4 kg)   12/20/22 269 lb 9.6 oz (122.3 kg)   12/15/22 279 lb (126.6 kg)       Current medications:  Prior to Admission medications    Medication Sig Start Date End Date Taking?  Authorizing Provider Semaglutide,0.25 or 0.5MG/DOS, (OZEMPIC, 0.25 OR 0.5 MG/DOSE,) 2 MG/1.5ML SOPN Inject 0.5 mg into the skin once a week 2/1/23   TORREY Gonzáles CNP   zaleplon (SONATA) 10 MG capsule Take 1 capsule by mouth nightly for 30 days. Max Daily Amount: 10 mg 1/19/23 2/18/23  TORREY Gonzáles CNP   DULoxetine (CYMBALTA) 20 MG extended release capsule TAKE 1 CAPSULE BY MOUTH EVERY DAY 1/16/23   TORREY Gonzáles CNP   hydrOXYzine HCl (ATARAX) 50 MG tablet TAKE 1 TABLET BY MOUTH EVERY DAY AT NIGHT 1/16/23   TORREY Gonzáles CNP   Lancets MISC 1 each by Does not apply route 3 times daily 1/3/23   TORREY Gonzáles CNP   Continuous Blood Gluc Sensor (FREESTYLE RANJIT 14 DAY SENSOR) MISC 1 each by Does not apply route continuous 12/20/22   TORREY Gonzáles CNP   pravastatin (PRAVACHOL) 20 MG tablet TAKE 1 TABLET BY MOUTH EVERY DAY 12/1/22   TORREY Gonzáles CNP   Cholecalciferol (VITAMIN D) 50 MCG (2000 UT) CAPS capsule Take by mouth 25,000 monthly    Historical Provider, MD   blood glucose test strips (GLUCOSE METER TEST) strip 1 each by In Vitro route 2 times daily As needed. Patient not taking: No sig reported 11/15/22   TORREY Gonzáles CNP   Blood Glucose Monitoring Suppl ([employer] BLOOD GLUCOSE METER) w/Device KIT 1 each by Does not apply route in the morning and at bedtime  Patient not taking: No sig reported 11/15/22   TORREY Gonzáles CNP   XARELTO 20 MG TABS tablet Take 20 mg by mouth daily 8/20/22   Historical Provider, MD   cyclobenzaprine (FLEXERIL) 10 MG tablet TAKE 1 TABLET BY MOUTH 2 TIMES DAILY AS NEEDED FOR MUSCLE SPASMS. 10/24/22   TORREY Gonzáles CNP   dilTIAZem (CARDIZEM) 60 MG tablet Take 1 tablet by mouth in the morning and 1 tablet in the evening.  9/14/22   TORREY Gonzáles CNP   sodium bicarbonate 650 MG tablet Take 650 mg by mouth 4 times daily    Historical Provider, MD   allopurinol (ZYLOPRIM) 100 MG tablet TAKE 1 TABLET BY MOUTH EVERY DAY 3/16/22   Leopoldo Seitz, APRN - CNP   simethicone (MYLICON) 80 MG chewable tablet Take 1 tablet by mouth 4 times daily as needed for Flatulence 1/21/22   Jr Heredia MD   pantoprazole (PROTONIX) 40 MG tablet Take 1 tablet by mouth 2 times daily (before meals) 1/6/22   Yamilet Guadeloupe P Blood, DO   sodium zirconium cyclosilicate (LOKELMA) 10 g PACK oral suspension Take 10 g by mouth daily    Historical Provider, MD   metoprolol (LOPRESSOR) 100 MG tablet Take 100 mg by mouth 2 times daily  11/3/21   Historical Provider, MD   ferrous sulfate (IRON 325) 325 (65 Fe) MG tablet Take 1 tablet by mouth daily (with breakfast) 12/28/20   Leopoldo Seitz, APRN - CNP   CPAP Machine MISC use as directed    Historical Provider, MD   Respiratory Therapy Supplies (CARETOUCH CPAP & BIPAP HOSE) MISC Mask and Tubing 1/28/20   Historical Provider, MD   Melatonin 10 MG TABS Take 1 tablet by mouth nightly as needed    Historical Provider, MD   Multiple Vitamins-Minerals (THERAPEUTIC MULTIVITAMIN-MINERALS) tablet Take 1 tablet by mouth daily 10/3/20   Ubaldo Brown MD   vitamin C (ASCORBIC ACID) 500 MG tablet Take 500 mg by mouth daily    Historical Provider, MD   acetaminophen (TYLENOL) 500 MG tablet Take 2 tablets by mouth every 6 hours as needed for Pain 8/11/20 12/21/21  Mamta Chapin MD   aspirin 81 MG tablet Take 81 mg by mouth nightly Stopped 12/14/2020    Historical Provider, MD       Immunizations:   Most Recent Immunizations   Administered Date(s) Administered    Pneumococcal Polysaccharide ([id number]) 12/12/2016       Home Blood Glucose Results: Unable to assess at this visit. Sent invitation to patient to connect with our practice. Will look at trends at next appointment. Blood glucose trends noted: Not applicable    ASSESSMENT     Diabetes Self Management Initial Assessment    DIABETES HISTORY  What type of diabetes do you have?   Type 2  Have you received any type of diabetes education in the past?  States he is currently getting diabetes education over at [address] you currently check your blood sugar levels at home? Yes - patient just recently started using CGM  What are your target blood glucose values? Fasting: <130  2 hours after eating: <180  What is your target A1c? 7  Do you currently take medications for your diabetes? Yes - Ozempic only          OTHER MEDICAL HISTORY    How would you rate your overall current health? Patient reveals his hx with kidney infection resulting in emergency surgery to remove. Patient also has hx of colon surgery as well with hx of colostomy bag but it has since been reversed. Do you perform regular foot care? Did not address     Date of last medical foot exam: Did not address    Date of last eye exam: Did not address   Date of last dental exam: Did not address     Immunizations:   Most Recent Immunizations   Administered Date(s) Administered    Pneumococcal Polysaccharide ([id number]) 12/12/2016       Please check all that apply:  Kidney problems - had emergency surgery to remove one kidney due to infection. LIFESTYLE   Have you made any changes in your lifestyle within the last year that you feel good about? Yes - patient states that ever since he received the jean paul CGM he has been able to better mange his diabetes        NUTRITION    Are you currently following any type of meal plan or diet (ex: high protein, high fat, low carb, vegan, etc)? Whitaker diet    What times do you typically eat your meals/snacks? Patient works 4pm to [employer].  Breakfast time:Unable to assess   Lunch time: 10:30pm - Wife packs whitaker meals such as roast  Dinner time: Unable to assess   Snack(s) time(s): Tuna crackers   Has your weight changed in the last year? He revealed he was down to 218lb due to two major surgeries/ kidney infection. EXERCISE  What type of exercise(s) do you do?    []  I do not exercise  []  Walking  []  Aerobics  [] Running  []  Swimming  []  Strength training  [x]  Other: Walks at work - [employer] is big enough for him to walk around on breaks    ______________________________________________________________________________    A1c at goal: No: 10.0% at today's visit   Blood Pressure at goal: No: Goal is 130/80. Statin: Yes    Appropriate?: Yes  Changes made:  None. Patient is not on high intensity statin. Will address at next visit. For reference, all diabetics should be on statin unless CI (active liver dx, ALT 3x normal, pregnant, breastfeeding, allergy to statin, age < 36 w/o ASCVD risk factors, LDL < 70    ACE/ARB: No: his fill history reveals that he was on lisinopril back in 2020  Appropriate?: No: patient had recent microalbuminuria ratio completed 12/19/22 which was >3,000   Changes made:  Recommended patient to follow up with endocrinologist to discuss the results of microalbuminuria lab result. Patient also revealed that he does not have protein restriction that he knows of. For reference all diabetics should be on ACE/ARB unless normal BP and normal urine albumin to creatinine ratio (< 30 mg/g) and normal GFR    Aspirin: Yes   Appropriate?: No: Potentially not appropriate. Patient is also on Xarelto due to hx of atrial fibrillation. Changes made:  Recommended to follow up with PCP to discuss     For reference:   -Primary prevention indications: DM + Age > 48 + 1 other risk factor (family hx of ASCVD, HTN, dyslipidemia, smoking, or albuminuria).  Not if at increased risk of bleeding.  -Secondary prevention indications: DM + hx of ASCVD (if allergy use ASA)    Eating patterns:    []  My plate    []  Mediterranean diet   []  Low sodium   []  DASH diet   []  Portion control   []  Reduced calorie    []  Fast food / Restaurant  []  Low carbohydrate   []  Sugary beverages   [x]  Other     Comment: Patient states he is on the Twist      Current Medications Affecting Diabetes:  None identified Compliant: yes  Barriers to medication therapy: no     Medications attempted in the past:  Patient was unable to identify medication history for diabetes According to fill history: In 2020 patient was on insulin aspart, insulin glargine, and sitagliptin. In 2016 metformin. PLAN     Provided initial education on diabetic disease state, medications, monitoring blood glucose, diet, and exercise. Initial diabetic education materials given to patient including the following: * Blood Glucose Log  *Living Well With Diabetes              * Using the Plate Method for a Balanced Meal Plan              * Things to Know About Hypoglycemia              *Smart Snacks              *A1c and Average Glucose Chart              *Alcohol and Diabetes              *Preventative Care Resources: Eye Care, Podiatrists, Dentists, [employer] material distributed. Education on Ozempic side effects and other potential benefits associated with medication. Physician Follow-up:  As scheduled    Medication Management Follow-up:   Diabetes Service in person February 28th @ 10am      Will consider additional therapy at next appointment after assessing glucose trends.     Electronically signed by Taylor Ashley on 2/17/2023 at 9:21 AM    For Pharmacy Admin Tracking Only    Program: Medication Management  CPA in place:  Yes  Recommendation Provided To: Patient/Caregiver: 1 via In person  Intervention Detail:   Intervention Accepted By: Patient/Caregiver: 1  Gap Closed?: No   Time Spent (min): 60

## 2023-02-27 NOTE — PROGRESS NOTES
Diabetic Medication Management Program  [address]. [address]  Phone: [phone]  Fax: [phone]    NAME: Ling Singh II  MEDICAL RECORD NUMBER:  [id number]  AGE: 70 y.o. GENDER: male  : 1960  EPISODE DATE:  2023     Mr. Denton was referred to [facility] Medication Management Services by Norma Castillo. Patient acknowledges working in consult agreement with clinical pharmacist and this provider. Goals per referral:   Fasting blood glucose: < 130  Peak postprandial glucose: < 180  A1C: < 7    SUBJECTIVE     Mr. Kesha Sherman is a 70 y.o. male here for the Diabetes Service for self-management education, medication review including over the counter medications and herbal products, overall wellbeing assessment, transition of care and any needed adjustments with updates and recommendations communicated to the referring physician. Patient Findings:     Medication changes  no  Diet changes  Yes: mindful of the carb content and the portion. Activity changes   Continues to walk at work with 8 loops per work shift  Emergency Room Visit or Hospitalization  no  Acute Illness/new problems  no  Symptoms of hypoglycemia  no - none  Symptoms of hyperglycemia  yes - fatigue  Medication adverse reactions  none    Comments: Patient is now on week two of the 0.5 mg dose of Ozempic and tolerating it well. He does not report any GI upset or nausea. He indicates his stomach feels tighter since increasing the dose. OBJECTIVE     PMHx:    Past Medical History:   Diagnosis Date    Arthritis     BILATERAL Fleeta Dole right shoulder    Back pain     Colostomy RUQ 2020    Frequent headaches     10/28/2020 PATIENT STATES EVERY OTHER DAY. Gout     History of atrial fibrillation     AFTER SURGERY ON 2020 WENT INTO A FIB. CONVERTED BACK TO NORMAL RHYTHM ON HIS OWN.  CARDIOLOGIST DR STEIN    History of blood transfusion     NO REACTION    [place] (hard of hearing)     HTN     Harsh Soria CNP    Hyperkalemia     Hyperlipidemia     Incisional hernia     Kidney infection     Sepsis ([address]     post surgery. leak in bowel    Sleep apnea     USES CPAP MACHINE    T2DM     K. Lui Hogan CNP. diet controlled    Tooth missing     RIGHT UPPER 2ND MOLAR    Under care of team 08/2021    Dr. Julia Tran [facility]    Wears glasses     READING    Wellness examination 07/2021    Cecilia Mccall CNP [place] [facility] [place]      Social History:    Social History     Tobacco Use    Smoking status: Never    Smokeless tobacco: Former     Types: Chew     Quit date: 1980   Substance Use Topics    Alcohol use: Yes     Alcohol/week: 2.0 standard drinks     Types: 2 Cans of beer per week     Comment: socially      Pertinent Labs:    Lab Results   Component Value Date    LABA1C 10.0 02/17/2023    LABA1C 10.5 11/15/2022    LABA1C 5.3 02/01/2021     Lab Results   Component Value Date    CHOL 197 12/19/2022    TRIG 495 (H) 12/19/2022    HDL 31 (L) 12/19/2022     Lab Results   Component Value Date    CREATININE 2.68 (H) 12/19/2022    BUN 47 (H) 12/19/2022     (L) 12/19/2022    K 5.9 (H) 12/19/2022    CL 94 (L) 12/19/2022    CO2 17 (L) 12/19/2022     Lab Results   Component Value Date/Time    ALT 41 12/19/2022 07:45 AM     Weight:  Wt Readings from Last 3 Encounters:   02/28/23 281 lb 6.4 oz (127.6 kg)   02/17/23 278 lb 11.2 oz (126.4 kg)   12/20/22 269 lb 9.6 oz (122.3 kg)     Blood Pressure:  BP Readings from Last 3 Encounters:   02/28/23 131/87   02/17/23 (!) 133/90   12/20/22 136/86     Current medications:    Current Outpatient Medications:     furosemide (LASIX) 40 MG tablet, Take 40 mg by mouth in the morning and 40 mg in the evening. As needed. , Disp: , Rfl:     vitamin E 1000 units capsule, Take 1,000 Units by mouth daily, Disp: , Rfl:     Semaglutide,0.25 or 0.5MG/DOS, (OZEMPIC, 0.25 OR 0.5 MG/DOSE,) 2 MG/1.5ML SOPN, Inject 0.5 mg into the skin once a week, Disp: 1.5 mL, Rfl: 0    DULoxetine (CYMBALTA) 20 MG extended release capsule, TAKE 1 CAPSULE BY MOUTH EVERY DAY, Disp: 90 capsule, Rfl: 3    hydrOXYzine HCl (ATARAX) 50 MG tablet, TAKE 1 TABLET BY MOUTH EVERY DAY AT NIGHT, Disp: 90 tablet, Rfl: 3    Lancets MISC, 1 each by Does not apply route 3 times daily, Disp: 300 each, Rfl: 5    Continuous Blood Gluc Sensor (FREESTYLE RANJIT 14 DAY SENSOR) MISC, 1 each by Does not apply route continuous, Disp: 2 each, Rfl: 5    pravastatin (PRAVACHOL) 20 MG tablet, TAKE 1 TABLET BY MOUTH EVERY DAY, Disp: 90 tablet, Rfl: 1    Cholecalciferol (VITAMIN D) 50 MCG (2000 UT) CAPS capsule, Take by mouth 25,000 monthly, Disp: , Rfl:     blood glucose test strips (GLUCOSE METER TEST) strip, 1 each by In Vitro route 2 times daily As needed. , Disp: 300 each, Rfl: 3    Blood Glucose Monitoring Suppl ([employer]S BLOOD GLUCOSE METER) w/Device KIT, 1 each by Does not apply route in the morning and at bedtime, Disp: 1 kit, Rfl: 0    XARELTO 20 MG TABS tablet, Take 20 mg by mouth daily, Disp: , Rfl:     cyclobenzaprine (FLEXERIL) 10 MG tablet, TAKE 1 TABLET BY MOUTH 2 TIMES DAILY AS NEEDED FOR MUSCLE SPASMS., Disp: 180 tablet, Rfl: 1    dilTIAZem (CARDIZEM) 60 MG tablet, Take 1 tablet by mouth in the morning and 1 tablet in the evening., Disp: 180 tablet, Rfl: 3    sodium bicarbonate 650 MG tablet, Take 650 mg by mouth 2 times daily 2 tablets twice daily, Disp: , Rfl:     allopurinol (ZYLOPRIM) 100 MG tablet, TAKE 1 TABLET BY MOUTH EVERY DAY, Disp: 90 tablet, Rfl: 3    simethicone (MYLICON) 80 MG chewable tablet, Take 1 tablet by mouth 4 times daily as needed for Flatulence, Disp: 180 tablet, Rfl: 3    pantoprazole (PROTONIX) 40 MG tablet, Take 1 tablet by mouth 2 times daily (before meals) (Patient not taking: Reported on 2/17/2023), Disp: 60 tablet, Rfl: 1    sodium zirconium cyclosilicate (LOKELMA) 10 g PACK oral suspension, Take 10 g by mouth daily, Disp: , Rfl:     metoprolol (LOPRESSOR) 100 MG tablet, Take 100 mg by mouth 2 times daily , Disp: , Rfl:     ferrous sulfate (IRON 325) 325 (65 Fe) MG tablet, Take 1 tablet by mouth daily (with breakfast), Disp: 180 tablet, Rfl: 1    CPAP Machine MISC, use as directed, Disp: , Rfl:     Respiratory Therapy Supplies (CARETOUCH CPAP & BIPAP HOSE) MISC, Mask and Tubing, Disp: , Rfl:     Melatonin 10 MG TABS, Take 1 tablet by mouth nightly as needed, Disp: , Rfl:     Multiple Vitamins-Minerals (THERAPEUTIC MULTIVITAMIN-MINERALS) tablet, Take 1 tablet by mouth daily, Disp: 90 tablet, Rfl: 1    vitamin C (ASCORBIC ACID) 500 MG tablet, Take 500 mg by mouth daily, Disp: , Rfl:     acetaminophen (TYLENOL) 500 MG tablet, Take 2 tablets by mouth every 6 hours as needed for Pain, Disp: 40 tablet, Rfl: 0    aspirin 81 MG tablet, Take 81 mg by mouth nightly Stopped 12/14/2020, Disp: , Rfl:     Immunizations:   Most Recent Immunizations   Administered Date(s) Administered    Pneumococcal Polysaccharide ([id number]) 12/12/2016       Home Blood Glucose Results: See below        Blood glucose trends noted:     ASSESSMENT     Recent A1c: 10.0% at last visit 2/17/23    Lab Results   Component Value Date    LABA1C 10.0 02/17/2023    LABA1C 10.5 11/15/2022    LABA1C 5.3 02/01/2021        Eating patterns: Continues to control portion and be mindful of carb content. Reviewed the amount of carbs patient should aim for with each meal (45 grams), which patient states he has been mindful of reading the nutritional label to achieve goal. Recommended to download helene \"CarbManager\" or \"MyFitnessPal\" to help know how much carbs are in each item he eats. Patient wanted to know if zero sugar red bull was a good option, discussed that there are other energy drinks with less calories and sodium like Celsius or Keenan for an alternative. Exercise patterns: At work patient states he walks about 8 loops per shift.      Blood Glucose Testing: Patient is currently using CGM - helped patient get linked to our practice to be able to see glucose trends. Current Diabetic Medications: Ozempic 0.5 mg weekly, initiated 20U Tresiba this visit. Diabetic Regimen/Compliance: Compliant     Other Medication Compliance: Compliant    OTHER: Began \"[facility]" Session 1 Section 1 with discussion on the definition of diabetes and the facts and myths. PLAN   Diabetic Action Plan is shown below. Patient and provider worked together to create goals which patient will work toward prior to the next appointment.         Physician Follow-up:  As scheduled    Medication Management Follow-up:   Telephone visit March 7th - to discuss blood sugars after starting long acting insulin     Diabetes Service  March 21st @ 10am     Electronically signed by Juan Carlos Luciano on 2/28/2023 at 11:38 AM    For Pharmacy Admin Tracking Only    Program: Medication Management  CPA in place:  Yes  Recommendation Provided To: Patient/Caregiver: 1 via In person  Intervention Detail: New Rx: 1, reason: Needs Additional Therapy  Intervention Accepted By: Patient/Caregiver: 1  Gap Closed?: No   Time Spent (min): 60

## 2023-02-28 ENCOUNTER — HOSPITAL ENCOUNTER (OUTPATIENT)
Dept: PHARMACY | Age: 63
Setting detail: THERAPIES SERIES
Discharge: HOME | End: 2023-02-28
Payer: COMMERCIAL

## 2023-02-28 VITALS
BODY MASS INDEX: 39.25 KG/M2 | TEMPERATURE: 98.2 F | HEART RATE: 83 BPM | WEIGHT: 281.4 LBS | OXYGEN SATURATION: 98 % | SYSTOLIC BLOOD PRESSURE: 131 MMHG | DIASTOLIC BLOOD PRESSURE: 87 MMHG

## 2023-02-28 PROCEDURE — 99213 OFFICE O/P EST LOW 20 MIN: CPT

## 2023-03-01 RX ORDER — INSULIN DEGLUDEC INJECTION 100 U/ML
20 INJECTION, SOLUTION SUBCUTANEOUS DAILY
Qty: 15 ML | Refills: 0 | Status: SHIPPED | OUTPATIENT
Start: 2023-03-01

## 2023-03-07 ENCOUNTER — HOSPITAL ENCOUNTER (OUTPATIENT)
Dept: PHARMACY | Age: 63
Setting detail: THERAPIES SERIES
Discharge: HOME | End: 2023-03-07

## 2023-03-07 DIAGNOSIS — E11.9 TYPE 2 DIABETES MELLITUS WITHOUT COMPLICATION, UNSPECIFIED WHETHER LONG TERM INSULIN USE (HCC): ICD-10-CM

## 2023-03-07 RX ORDER — INSULIN GLARGINE 100 [IU]/ML
20 INJECTION, SOLUTION SUBCUTANEOUS DAILY
Qty: 5 ADJUSTABLE DOSE PRE-FILLED PEN SYRINGE | Refills: 0 | Status: SHIPPED | OUTPATIENT
Start: 2023-03-07

## 2023-03-07 RX ORDER — SEMAGLUTIDE 1.34 MG/ML
0.5 INJECTION, SOLUTION SUBCUTANEOUS WEEKLY
Qty: 3 ML | Refills: 4 | Status: ON HOLD | OUTPATIENT
Start: 2023-03-07 | End: 2023-04-19 | Stop reason: HOSPADM

## 2023-03-07 RX ORDER — CYCLOBENZAPRINE HCL 10 MG
10 TABLET ORAL 2 TIMES DAILY PRN
Qty: 180 TABLET | Refills: 1 | Status: SHIPPED | OUTPATIENT
Start: 2023-03-07

## 2023-03-07 NOTE — PROGRESS NOTES
Diabetes Medication Management Telephone Follow-Up     Blood glucose readings and trends: Target range increased by 2% (24 to 26%)since last visit (-3/7)        Missed doses:  Patient has not started insulin yet stating [facility] has not called him. Medication changes  None yet as per above  Diet/exercise changes   no  Symptoms of hypoglycemia no - none  Symptoms of hyperglycemia no - none  Medication adverse reactions none    Plan: I called to speak with pharmacy staff at [facility] but was forced to leave voicemail. Requested call back to clarify if insulin was ready of if another agent was needed. Also, called patient back and asked him to follow up with the pharmacy and to let us know when he has obtained the insulin.     Next follow-up: 1 week via phone    For Pharmacy Admin Tracking Only    Intervention Detail: Adherence Monitorin  Total # of Interventions Recommended: 1  Total # of Interventions Accepted: 1  Time Spent (min): 15

## 2023-03-14 ENCOUNTER — HOSPITAL ENCOUNTER (OUTPATIENT)
Dept: PHARMACY | Age: 63
Setting detail: THERAPIES SERIES
Discharge: HOME | End: 2023-03-14

## 2023-03-14 NOTE — PROGRESS NOTES
Patient was able to start long acting insulin and has been compliant other than missing one dose yesterday. We are starting to see BG trends decrease gradually.

## 2023-03-20 ENCOUNTER — HOSPITAL ENCOUNTER (OUTPATIENT)
Dept: PHARMACY | Age: 63
Setting detail: THERAPIES SERIES
Discharge: HOME | End: 2023-03-20
Payer: COMMERCIAL

## 2023-03-20 ENCOUNTER — OFFICE VISIT (OUTPATIENT)
Dept: PRIMARY CARE CLINIC | Age: 63
End: 2023-03-20
Payer: COMMERCIAL

## 2023-03-20 VITALS
WEIGHT: 267 LBS | HEART RATE: 109 BPM | SYSTOLIC BLOOD PRESSURE: 158 MMHG | BODY MASS INDEX: 37.24 KG/M2 | OXYGEN SATURATION: 97 % | TEMPERATURE: 98.6 F | DIASTOLIC BLOOD PRESSURE: 107 MMHG

## 2023-03-20 VITALS
WEIGHT: 267 LBS | HEART RATE: 84 BPM | DIASTOLIC BLOOD PRESSURE: 92 MMHG | BODY MASS INDEX: 37.24 KG/M2 | SYSTOLIC BLOOD PRESSURE: 140 MMHG | OXYGEN SATURATION: 95 %

## 2023-03-20 DIAGNOSIS — J40 BRONCHITIS: Primary | ICD-10-CM

## 2023-03-20 DIAGNOSIS — B96.89 ACUTE BACTERIAL SINUSITIS: ICD-10-CM

## 2023-03-20 DIAGNOSIS — J01.90 ACUTE BACTERIAL SINUSITIS: ICD-10-CM

## 2023-03-20 PROCEDURE — 1036F TOBACCO NON-USER: CPT | Performed by: NURSE PRACTITIONER

## 2023-03-20 PROCEDURE — 99212 OFFICE O/P EST SF 10 MIN: CPT

## 2023-03-20 PROCEDURE — 3017F COLORECTAL CA SCREEN DOC REV: CPT | Performed by: NURSE PRACTITIONER

## 2023-03-20 PROCEDURE — 99213 OFFICE O/P EST LOW 20 MIN: CPT | Performed by: NURSE PRACTITIONER

## 2023-03-20 PROCEDURE — 3077F SYST BP >= 140 MM HG: CPT | Performed by: NURSE PRACTITIONER

## 2023-03-20 PROCEDURE — 3080F DIAST BP >= 90 MM HG: CPT | Performed by: NURSE PRACTITIONER

## 2023-03-20 PROCEDURE — G8417 CALC BMI ABV UP PARAM F/U: HCPCS | Performed by: NURSE PRACTITIONER

## 2023-03-20 PROCEDURE — G8484 FLU IMMUNIZE NO ADMIN: HCPCS | Performed by: NURSE PRACTITIONER

## 2023-03-20 PROCEDURE — G8427 DOCREV CUR MEDS BY ELIG CLIN: HCPCS | Performed by: NURSE PRACTITIONER

## 2023-03-20 PROCEDURE — 1123F ACP DISCUSS/DSCN MKR DOCD: CPT | Performed by: NURSE PRACTITIONER

## 2023-03-20 RX ORDER — PREDNISONE 20 MG/1
40 TABLET ORAL DAILY
Qty: 6 TABLET | Refills: 0 | Status: SHIPPED | OUTPATIENT
Start: 2023-03-20 | End: 2023-03-23

## 2023-03-20 RX ORDER — DOXYCYCLINE HYCLATE 100 MG/1
100 CAPSULE ORAL 2 TIMES DAILY
Qty: 20 CAPSULE | Refills: 0 | Status: SHIPPED | OUTPATIENT
Start: 2023-03-20 | End: 2023-03-30

## 2023-03-20 RX ORDER — SEMAGLUTIDE 1.34 MG/ML
1 INJECTION, SOLUTION SUBCUTANEOUS WEEKLY
Qty: 3 ML | Refills: 2 | Status: SHIPPED | OUTPATIENT
Start: 2023-03-20

## 2023-03-20 RX ORDER — PEN NEEDLE, DIABETIC 31 GX5/16"
NEEDLE, DISPOSABLE MISCELLANEOUS
COMMUNITY
Start: 2023-03-09

## 2023-03-20 RX ORDER — ERGOCALCIFEROL 1.25 MG/1
CAPSULE ORAL
COMMUNITY
Start: 2023-01-30

## 2023-03-20 ASSESSMENT — ENCOUNTER SYMPTOMS
SORE THROAT: 0
COUGH: 1
CHEST TIGHTNESS: 0
SHORTNESS OF BREATH: 1
EYE REDNESS: 0
VOICE CHANGE: 0
SINUS COMPLAINT: 1
WHEEZING: 1
SINUS PRESSURE: 0
EYE DISCHARGE: 0

## 2023-03-20 NOTE — PROGRESS NOTES
discharge and redness. Respiratory:  Positive for cough, shortness of breath (with exertion) and wheezing. Negative for chest tightness. Cardiovascular: Negative. Negative for chest pain. Musculoskeletal:  Negative for myalgias. Skin:  Negative for rash. Neurological:  Negative for dizziness, weakness, light-headedness and headaches. Hematological:  Negative for adenopathy. All other systems reviewed and are negative. Objective:      Physical Exam  Vitals and nursing note reviewed. Constitutional:       General: He is not in acute distress. Appearance: Normal appearance. He is well-developed. He is not ill-appearing, toxic-appearing or diaphoretic. HENT:      Head: Normocephalic and atraumatic. Right Ear: Tympanic membrane and external ear normal.      Left Ear: Tympanic membrane and external ear normal.      Nose: Nose normal.      Right Sinus: No maxillary sinus tenderness or frontal sinus tenderness. Left Sinus: No maxillary sinus tenderness or frontal sinus tenderness. Mouth/Throat:      Pharynx: Oropharyngeal exudate (PND) and posterior oropharyngeal erythema present. Eyes:      General:         Right eye: No discharge. Left eye: No discharge. Cardiovascular:      Rate and Rhythm: Normal rate and regular rhythm. Heart sounds: Normal heart sounds. No murmur heard. Pulmonary:      Effort: Pulmonary effort is normal. No respiratory distress. Breath sounds: Transmitted upper airway sounds present. Wheezing present. No rales. Lymphadenopathy:      Cervical: No cervical adenopathy. Skin:     General: Skin is warm and dry. Findings: No rash. Neurological:      Mental Status: He is alert. BP (!) 158/107   Pulse (!) 109   Temp 98.6 °F (37 °C)   Wt 267 lb (121.1 kg)   SpO2 97%   BMI 37.24 kg/m²     Assessment:       Diagnosis Orders   1.  Bronchitis  doxycycline hyclate (VIBRAMYCIN) 100 MG capsule    predniSONE (DELTASONE) 20 MG tablet

## 2023-03-20 NOTE — PROGRESS NOTES
Disp: 15 mL, Rfl: 0    furosemide (LASIX) 40 MG tablet, Take 40 mg by mouth in the morning and 40 mg in the evening. As needed. , Disp: , Rfl:     vitamin E 1000 units capsule, Take 1,000 Units by mouth daily, Disp: , Rfl:     DULoxetine (CYMBALTA) 20 MG extended release capsule, TAKE 1 CAPSULE BY MOUTH EVERY DAY, Disp: 90 capsule, Rfl: 3    hydrOXYzine HCl (ATARAX) 50 MG tablet, TAKE 1 TABLET BY MOUTH EVERY DAY AT NIGHT, Disp: 90 tablet, Rfl: 3    Lancets MISC, 1 each by Does not apply route 3 times daily, Disp: 300 each, Rfl: 5    Continuous Blood Gluc Sensor (FREESTYLE RANJIT 14 DAY SENSOR) MISC, 1 each by Does not apply route continuous, Disp: 2 each, Rfl: 5    pravastatin (PRAVACHOL) 20 MG tablet, TAKE 1 TABLET BY MOUTH EVERY DAY, Disp: 90 tablet, Rfl: 1    Cholecalciferol (VITAMIN D) 50 MCG (2000 UT) CAPS capsule, Take by mouth 25,000 monthly, Disp: , Rfl:     blood glucose test strips (GLUCOSE METER TEST) strip, 1 each by In Vitro route 2 times daily As needed. , Disp: 300 each, Rfl: 3    Blood Glucose Monitoring Suppl (CVS BLOOD GLUCOSE METER) w/Device KIT, 1 each by Does not apply route in the morning and at bedtime, Disp: 1 kit, Rfl: 0    XARELTO 20 MG TABS tablet, Take 20 mg by mouth daily, Disp: , Rfl:     dilTIAZem (CARDIZEM) 60 MG tablet, Take 1 tablet by mouth in the morning and 1 tablet in the evening., Disp: 180 tablet, Rfl: 3    sodium bicarbonate 650 MG tablet, Take 650 mg by mouth 2 times daily 2 tablets twice daily, Disp: , Rfl:     allopurinol (ZYLOPRIM) 100 MG tablet, TAKE 1 TABLET BY MOUTH EVERY DAY, Disp: 90 tablet, Rfl: 3    simethicone (MYLICON) 80 MG chewable tablet, Take 1 tablet by mouth 4 times daily as needed for Flatulence, Disp: 180 tablet, Rfl: 3    pantoprazole (PROTONIX) 40 MG tablet, Take 1 tablet by mouth 2 times daily (before meals) (Patient not taking: Reported on 2/17/2023), Disp: 60 tablet, Rfl: 1    sodium zirconium cyclosilicate (LOKELMA) 10 g PACK oral suspension, Take 10 g

## 2023-03-20 NOTE — LETTER
March 20, 2023       Nehemias Guillen II YOB: 1960   [address] [address] Date of Visit:  3/20/2023       To Whom It May Concern:    Jennifer David was seen in my clinic on 3/20/2023. Please excuse his absence on 3/20/2023, 3/21/2023, and 3/22/2023. If you have any questions or concerns, please don't hesitate to call.     Sincerely,        TORREY Ventura - CNP

## 2023-03-21 ENCOUNTER — APPOINTMENT (OUTPATIENT)
Dept: PHARMACY | Age: 63
End: 2023-03-21
Payer: COMMERCIAL

## 2023-03-28 ENCOUNTER — HOSPITAL ENCOUNTER (OUTPATIENT)
Dept: PHARMACY | Age: 63
Setting detail: THERAPIES SERIES
Discharge: HOME | End: 2023-03-28
Payer: COMMERCIAL

## 2023-03-28 VITALS
HEART RATE: 92 BPM | DIASTOLIC BLOOD PRESSURE: 91 MMHG | TEMPERATURE: 98.2 F | BODY MASS INDEX: 38.69 KG/M2 | WEIGHT: 277.4 LBS | SYSTOLIC BLOOD PRESSURE: 147 MMHG | OXYGEN SATURATION: 99 %

## 2023-03-28 PROCEDURE — 99212 OFFICE O/P EST SF 10 MIN: CPT

## 2023-03-28 NOTE — PROGRESS NOTES
Diabetic Medication Management Program  [address]. [address]  Phone: [phone]  Fax: [phone]    NAME: Bereket Vazquez II  MEDICAL RECORD NUMBER:  [id number]  AGE: 70 y.o. GENDER: male  : 1960  EPISODE DATE:  3/28/2023     Mr. Denton was referred to [facility] by Ethan Rm CNP. Patient acknowledges working in consult agreement with clinical pharmacist and this provider. Goals per referral:   Fasting blood glucose: < 130  Peak postprandial glucose: < 180  A1C: < 7    SUBJECTIVE     Mr. Eufemia Velasquez is a 70 y.o. male here for the Diabetes Service for self-management education, medication review including over the counter medications and herbal products, overall wellbeing assessment, transition of care and any needed adjustments with updates and recommendations communicated to the referring physician. Patient Findings:     Medication changes  no  Diet changes  no  Activity changes  no  Emergency Room Visit or Hospitalization  no  Acute Illness/new problems  no  Symptoms of hypoglycemia  no - none  Symptoms of hyperglycemia  no - none  Medication adverse reactions  none    Comments:Patient notices increase in energy since getting his blood glucose under control. He notes feeling less tired even after working a 12 hour shift. OBJECTIVE     PMHx:    Past Medical History:   Diagnosis Date    Arthritis     BILATERAL Sai Skates right shoulder    Back pain     Colostomy RUQ 2020    Frequent headaches     10/28/2020 PATIENT STATES EVERY OTHER DAY. Gout     History of atrial fibrillation     AFTER SURGERY ON 2020 WENT INTO A FIB. CONVERTED BACK TO NORMAL RHYTHM ON HIS OWN.  CARDIOLOGIST DR Renan Castro    History of blood transfusion     NO REACTION    [place] (hard of hearing)     HTN     Quinnfe Lemus CNP    Hyperkalemia     Hyperlipidemia     Incisional hernia     Kidney infection     Sepsis ([address]     post

## 2023-04-17 ENCOUNTER — APPOINTMENT (OUTPATIENT)
Dept: GENERAL RADIOLOGY | Age: 63
End: 2023-04-17
Payer: MEDICARE

## 2023-04-17 ENCOUNTER — HOSPITAL ENCOUNTER (INPATIENT)
Age: 63
LOS: 2 days | Discharge: HOME | End: 2023-04-19
Attending: EMERGENCY MEDICINE | Admitting: STUDENT IN AN ORGANIZED HEALTH CARE EDUCATION/TRAINING PROGRAM
Payer: MEDICARE

## 2023-04-17 ENCOUNTER — APPOINTMENT (OUTPATIENT)
Dept: CT IMAGING | Age: 63
End: 2023-04-17
Payer: MEDICARE

## 2023-04-17 ENCOUNTER — HOSPITAL ENCOUNTER (OUTPATIENT)
Dept: PHARMACY | Age: 63
Setting detail: THERAPIES SERIES
Discharge: HOME | End: 2023-04-17
Payer: MEDICARE

## 2023-04-17 VITALS
DIASTOLIC BLOOD PRESSURE: 77 MMHG | OXYGEN SATURATION: 98 % | SYSTOLIC BLOOD PRESSURE: 121 MMHG | BODY MASS INDEX: 37.8 KG/M2 | HEART RATE: 108 BPM | WEIGHT: 271 LBS

## 2023-04-17 DIAGNOSIS — R10.84 GENERALIZED ABDOMINAL PAIN: Primary | ICD-10-CM

## 2023-04-17 PROBLEM — K56.609 SBO (SMALL BOWEL OBSTRUCTION) (HCC): Status: ACTIVE | Noted: 2023-04-17

## 2023-04-17 PROBLEM — E11.21 TYPE 2 DIABETES MELLITUS WITH DIABETIC NEPHROPATHY, WITH LONG-TERM CURRENT USE OF INSULIN (HCC): Status: ACTIVE | Noted: 2023-04-17

## 2023-04-17 PROBLEM — Z79.4 TYPE 2 DIABETES MELLITUS WITH DIABETIC NEPHROPATHY, WITH LONG-TERM CURRENT USE OF INSULIN (HCC): Status: ACTIVE | Noted: 2023-04-17

## 2023-04-17 LAB
ABSOLUTE EOS #: 0.34 K/UL (ref 0–0.44)
ABSOLUTE IMMATURE GRANULOCYTE: 0.07 K/UL (ref 0–0.3)
ABSOLUTE LYMPH #: 1.96 K/UL (ref 1.1–3.7)
ABSOLUTE MONO #: 1.12 K/UL (ref 0.1–1.2)
ALBUMIN SERPL-MCNC: 3.7 G/DL (ref 3.5–5.2)
ALBUMIN/GLOBULIN RATIO: 1.2 (ref 1–2.5)
ALP SERPL-CCNC: 77 U/L (ref 40–129)
ALT SERPL-CCNC: 21 U/L (ref 5–41)
ANION GAP SERPL CALCULATED.3IONS-SCNC: 14 MMOL/L (ref 9–17)
AST SERPL-CCNC: 21 U/L
BASOPHILS # BLD: 0 % (ref 0–2)
BASOPHILS ABSOLUTE: 0.04 K/UL (ref 0–0.2)
BILIRUB SERPL-MCNC: 0.2 MG/DL (ref 0.3–1.2)
BUN SERPL-MCNC: 45 MG/DL (ref 8–23)
CALCIUM SERPL-MCNC: 8.9 MG/DL (ref 8.6–10.4)
CHLORIDE SERPL-SCNC: 108 MMOL/L (ref 98–107)
CO2 SERPL-SCNC: 20 MMOL/L (ref 20–31)
CREAT SERPL-MCNC: 2.65 MG/DL (ref 0.7–1.2)
EOSINOPHILS RELATIVE PERCENT: 3 % (ref 1–4)
GFR SERPL CREATININE-BSD FRML MDRD: 26 ML/MIN/1.73M2
GLUCOSE BLD-MCNC: 81 MG/DL (ref 75–110)
GLUCOSE SERPL-MCNC: 130 MG/DL (ref 70–99)
HCT VFR BLD AUTO: 41.5 % (ref 40.7–50.3)
HGB BLD-MCNC: 13.7 G/DL (ref 13–17)
IMMATURE GRANULOCYTES: 1 %
LYMPHOCYTES # BLD: 17 % (ref 24–43)
MCH RBC QN AUTO: 29.1 PG (ref 25.2–33.5)
MCHC RBC AUTO-ENTMCNC: 33 G/DL (ref 28.4–34.8)
MCV RBC AUTO: 88.1 FL (ref 82.6–102.9)
MONOCYTES # BLD: 10 % (ref 3–12)
NRBC AUTOMATED: 0 PER 100 WBC
PDW BLD-RTO: 14.1 % (ref 11.8–14.4)
PLATELET # BLD AUTO: 248 K/UL (ref 138–453)
PMV BLD AUTO: 10 FL (ref 8.1–13.5)
POTASSIUM SERPL-SCNC: 4.2 MMOL/L (ref 3.7–5.3)
PROT SERPL-MCNC: 6.8 G/DL (ref 6.4–8.3)
RBC # BLD: 4.71 M/UL (ref 4.21–5.77)
SEG NEUTROPHILS: 69 % (ref 36–65)
SEGMENTED NEUTROPHILS ABSOLUTE COUNT: 7.78 K/UL (ref 1.5–8.1)
SODIUM SERPL-SCNC: 142 MMOL/L (ref 135–144)
WBC # BLD AUTO: 11.3 K/UL (ref 3.5–11.3)

## 2023-04-17 PROCEDURE — 99212 OFFICE O/P EST SF 10 MIN: CPT

## 2023-04-17 PROCEDURE — 6370000000 HC RX 637 (ALT 250 FOR IP): Performed by: NURSE PRACTITIONER

## 2023-04-17 PROCEDURE — 2060000000 HC ICU INTERMEDIATE R&B

## 2023-04-17 PROCEDURE — 87449 NOS EACH ORGANISM AG IA: CPT

## 2023-04-17 PROCEDURE — 83630 LACTOFERRIN FECAL (QUAL): CPT

## 2023-04-17 PROCEDURE — 87324 CLOSTRIDIUM AG IA: CPT

## 2023-04-17 PROCEDURE — 85025 COMPLETE CBC W/AUTO DIFF WBC: CPT

## 2023-04-17 PROCEDURE — 80053 COMPREHEN METABOLIC PANEL: CPT

## 2023-04-17 PROCEDURE — 2580000003 HC RX 258

## 2023-04-17 PROCEDURE — 74176 CT ABD & PELVIS W/O CONTRAST: CPT

## 2023-04-17 PROCEDURE — 82272 OCCULT BLD FECES 1-3 TESTS: CPT

## 2023-04-17 PROCEDURE — 6360000002 HC RX W HCPCS

## 2023-04-17 PROCEDURE — 74022 RADEX COMPL AQT ABD SERIES: CPT

## 2023-04-17 PROCEDURE — 99223 1ST HOSP IP/OBS HIGH 75: CPT | Performed by: HOSPITALIST

## 2023-04-17 PROCEDURE — 82947 ASSAY GLUCOSE BLOOD QUANT: CPT

## 2023-04-17 PROCEDURE — 99285 EMERGENCY DEPT VISIT HI MDM: CPT

## 2023-04-17 PROCEDURE — 96374 THER/PROPH/DIAG INJ IV PUSH: CPT

## 2023-04-17 RX ORDER — INSULIN LISPRO 100 [IU]/ML
0-4 INJECTION, SOLUTION INTRAVENOUS; SUBCUTANEOUS EVERY 4 HOURS
Status: DISCONTINUED | OUTPATIENT
Start: 2023-04-17 | End: 2023-04-18

## 2023-04-17 RX ORDER — MORPHINE SULFATE 4 MG/ML
2 INJECTION, SOLUTION INTRAMUSCULAR; INTRAVENOUS
Status: DISCONTINUED | OUTPATIENT
Start: 2023-04-17 | End: 2023-04-19 | Stop reason: HOSPADM

## 2023-04-17 RX ORDER — MORPHINE SULFATE 4 MG/ML
2 INJECTION, SOLUTION INTRAMUSCULAR; INTRAVENOUS
Status: DISCONTINUED | OUTPATIENT
Start: 2023-04-17 | End: 2023-04-17

## 2023-04-17 RX ORDER — SODIUM CHLORIDE, SODIUM LACTATE, POTASSIUM CHLORIDE, AND CALCIUM CHLORIDE .6; .31; .03; .02 G/100ML; G/100ML; G/100ML; G/100ML
1000 INJECTION, SOLUTION INTRAVENOUS ONCE
Status: COMPLETED | OUTPATIENT
Start: 2023-04-17 | End: 2023-04-17

## 2023-04-17 RX ORDER — MORPHINE SULFATE 4 MG/ML
4 INJECTION, SOLUTION INTRAMUSCULAR; INTRAVENOUS
Status: DISCONTINUED | OUTPATIENT
Start: 2023-04-17 | End: 2023-04-17

## 2023-04-17 RX ORDER — ONDANSETRON 2 MG/ML
4 INJECTION INTRAMUSCULAR; INTRAVENOUS ONCE
Status: COMPLETED | OUTPATIENT
Start: 2023-04-17 | End: 2023-04-17

## 2023-04-17 RX ORDER — FUROSEMIDE 40 MG/1
40 TABLET ORAL 2 TIMES DAILY
Status: DISCONTINUED | OUTPATIENT
Start: 2023-04-18 | End: 2023-04-19 | Stop reason: HOSPADM

## 2023-04-17 RX ORDER — ALLOPURINOL 100 MG/1
100 TABLET ORAL DAILY
Status: DISCONTINUED | OUTPATIENT
Start: 2023-04-18 | End: 2023-04-19 | Stop reason: HOSPADM

## 2023-04-17 RX ORDER — ONDANSETRON 4 MG/1
4 TABLET, ORALLY DISINTEGRATING ORAL EVERY 8 HOURS PRN
Status: DISCONTINUED | OUTPATIENT
Start: 2023-04-17 | End: 2023-04-19 | Stop reason: HOSPADM

## 2023-04-17 RX ORDER — SODIUM CHLORIDE 9 MG/ML
INJECTION, SOLUTION INTRAVENOUS PRN
Status: DISCONTINUED | OUTPATIENT
Start: 2023-04-17 | End: 2023-04-18

## 2023-04-17 RX ORDER — SODIUM CHLORIDE 9 MG/ML
INJECTION, SOLUTION INTRAVENOUS CONTINUOUS
Status: DISCONTINUED | OUTPATIENT
Start: 2023-04-17 | End: 2023-04-17

## 2023-04-17 RX ORDER — SODIUM BICARBONATE 650 MG/1
650 TABLET ORAL 2 TIMES DAILY
Status: DISCONTINUED | OUTPATIENT
Start: 2023-04-17 | End: 2023-04-19 | Stop reason: HOSPADM

## 2023-04-17 RX ORDER — CHOLECALCIFEROL (VITAMIN D3) 125 MCG
10 CAPSULE ORAL NIGHTLY PRN
Status: DISCONTINUED | OUTPATIENT
Start: 2023-04-17 | End: 2023-04-19 | Stop reason: HOSPADM

## 2023-04-17 RX ORDER — METOPROLOL TARTRATE 50 MG/1
100 TABLET, FILM COATED ORAL 2 TIMES DAILY
Status: DISCONTINUED | OUTPATIENT
Start: 2023-04-17 | End: 2023-04-19 | Stop reason: HOSPADM

## 2023-04-17 RX ORDER — DULOXETIN HYDROCHLORIDE 20 MG/1
20 CAPSULE, DELAYED RELEASE ORAL DAILY
Status: DISCONTINUED | OUTPATIENT
Start: 2023-04-18 | End: 2023-04-19 | Stop reason: HOSPADM

## 2023-04-17 RX ORDER — LANOLIN ALCOHOL/MO/W.PET/CERES
325 CREAM (GRAM) TOPICAL
Status: DISCONTINUED | OUTPATIENT
Start: 2023-04-18 | End: 2023-04-19 | Stop reason: HOSPADM

## 2023-04-17 RX ORDER — ACETAMINOPHEN 650 MG/1
650 SUPPOSITORY RECTAL EVERY 6 HOURS PRN
Status: DISCONTINUED | OUTPATIENT
Start: 2023-04-17 | End: 2023-04-18

## 2023-04-17 RX ORDER — SODIUM CHLORIDE 0.9 % (FLUSH) 0.9 %
10 SYRINGE (ML) INJECTION PRN
Status: DISCONTINUED | OUTPATIENT
Start: 2023-04-17 | End: 2023-04-19 | Stop reason: HOSPADM

## 2023-04-17 RX ORDER — POTASSIUM CHLORIDE 20 MEQ/1
40 TABLET, EXTENDED RELEASE ORAL PRN
Status: DISCONTINUED | OUTPATIENT
Start: 2023-04-17 | End: 2023-04-19 | Stop reason: HOSPADM

## 2023-04-17 RX ORDER — ASPIRIN 81 MG/1
81 TABLET ORAL NIGHTLY
Status: DISCONTINUED | OUTPATIENT
Start: 2023-04-17 | End: 2023-04-18

## 2023-04-17 RX ORDER — HYDROXYZINE 50 MG/1
50 TABLET, FILM COATED ORAL NIGHTLY
Status: DISCONTINUED | OUTPATIENT
Start: 2023-04-17 | End: 2023-04-19 | Stop reason: HOSPADM

## 2023-04-17 RX ORDER — ONDANSETRON 2 MG/ML
4 INJECTION INTRAMUSCULAR; INTRAVENOUS EVERY 6 HOURS PRN
Status: DISCONTINUED | OUTPATIENT
Start: 2023-04-17 | End: 2023-04-19 | Stop reason: HOSPADM

## 2023-04-17 RX ORDER — ACETAMINOPHEN 325 MG/1
650 TABLET ORAL EVERY 6 HOURS PRN
Status: DISCONTINUED | OUTPATIENT
Start: 2023-04-17 | End: 2023-04-18

## 2023-04-17 RX ORDER — INSULIN GLARGINE 100 [IU]/ML
20 INJECTION, SOLUTION SUBCUTANEOUS NIGHTLY
Status: DISCONTINUED | OUTPATIENT
Start: 2023-04-17 | End: 2023-04-19 | Stop reason: HOSPADM

## 2023-04-17 RX ORDER — POTASSIUM CHLORIDE 7.45 MG/ML
10 INJECTION INTRAVENOUS PRN
Status: DISCONTINUED | OUTPATIENT
Start: 2023-04-17 | End: 2023-04-19 | Stop reason: HOSPADM

## 2023-04-17 RX ORDER — PRAVASTATIN SODIUM 20 MG
20 TABLET ORAL DAILY
Status: DISCONTINUED | OUTPATIENT
Start: 2023-04-18 | End: 2023-04-19 | Stop reason: HOSPADM

## 2023-04-17 RX ORDER — DILTIAZEM HYDROCHLORIDE 60 MG/1
60 TABLET, FILM COATED ORAL EVERY 12 HOURS
Status: DISCONTINUED | OUTPATIENT
Start: 2023-04-17 | End: 2023-04-19 | Stop reason: HOSPADM

## 2023-04-17 RX ORDER — MAGNESIUM SULFATE 1 G/100ML
1000 INJECTION INTRAVENOUS PRN
Status: DISCONTINUED | OUTPATIENT
Start: 2023-04-17 | End: 2023-04-19 | Stop reason: HOSPADM

## 2023-04-17 RX ORDER — DEXTROSE AND SODIUM CHLORIDE 5; .45 G/100ML; G/100ML
INJECTION, SOLUTION INTRAVENOUS CONTINUOUS
Status: DISCONTINUED | OUTPATIENT
Start: 2023-04-17 | End: 2023-04-18

## 2023-04-17 RX ORDER — POLYETHYLENE GLYCOL 3350 17 G/17G
17 POWDER, FOR SOLUTION ORAL DAILY PRN
Status: DISCONTINUED | OUTPATIENT
Start: 2023-04-17 | End: 2023-04-19 | Stop reason: HOSPADM

## 2023-04-17 RX ORDER — DEXTROSE MONOHYDRATE 100 MG/ML
INJECTION, SOLUTION INTRAVENOUS CONTINUOUS PRN
Status: DISCONTINUED | OUTPATIENT
Start: 2023-04-17 | End: 2023-04-19 | Stop reason: HOSPADM

## 2023-04-17 RX ORDER — SODIUM CHLORIDE 0.9 % (FLUSH) 0.9 %
5-40 SYRINGE (ML) INJECTION EVERY 12 HOURS SCHEDULED
Status: DISCONTINUED | OUTPATIENT
Start: 2023-04-17 | End: 2023-04-19 | Stop reason: HOSPADM

## 2023-04-17 RX ORDER — DEXTROSE AND SODIUM CHLORIDE 5; .45 G/100ML; G/100ML
INJECTION, SOLUTION INTRAVENOUS
Status: COMPLETED
Start: 2023-04-17 | End: 2023-04-17

## 2023-04-17 RX ORDER — MORPHINE SULFATE 4 MG/ML
4 INJECTION, SOLUTION INTRAMUSCULAR; INTRAVENOUS
Status: DISCONTINUED | OUTPATIENT
Start: 2023-04-17 | End: 2023-04-19 | Stop reason: HOSPADM

## 2023-04-17 RX ADMIN — ONDANSETRON 4 MG: 2 INJECTION INTRAMUSCULAR; INTRAVENOUS at 15:42

## 2023-04-17 RX ADMIN — Medication 10 MG: at 23:36

## 2023-04-17 RX ADMIN — DEXTROSE AND SODIUM CHLORIDE: 5; 450 INJECTION, SOLUTION INTRAVENOUS at 23:01

## 2023-04-17 RX ADMIN — METOPROLOL TARTRATE 100 MG: 50 TABLET, FILM COATED ORAL at 22:50

## 2023-04-17 RX ADMIN — SODIUM CHLORIDE, POTASSIUM CHLORIDE, SODIUM LACTATE AND CALCIUM CHLORIDE 1000 ML: 600; 310; 30; 20 INJECTION, SOLUTION INTRAVENOUS at 15:04

## 2023-04-17 RX ADMIN — SODIUM BICARBONATE 650 MG: 648 TABLET ORAL at 23:36

## 2023-04-17 RX ADMIN — DILTIAZEM HYDROCHLORIDE 60 MG: 60 TABLET, FILM COATED ORAL at 23:36

## 2023-04-17 RX ADMIN — HYDROXYZINE HYDROCHLORIDE 50 MG: 25 TABLET ORAL at 22:50

## 2023-04-17 ASSESSMENT — PAIN DESCRIPTION - LOCATION: LOCATION: ABDOMEN

## 2023-04-17 ASSESSMENT — ENCOUNTER SYMPTOMS
ABDOMINAL PAIN: 0
ABDOMINAL DISTENTION: 1
NAUSEA: 1
SHORTNESS OF BREATH: 0
BLOOD IN STOOL: 0
VOMITING: 1
DIARRHEA: 1

## 2023-04-17 ASSESSMENT — PAIN - FUNCTIONAL ASSESSMENT
PAIN_FUNCTIONAL_ASSESSMENT: 0-10
PAIN_FUNCTIONAL_ASSESSMENT: ACTIVITIES ARE NOT PREVENTED

## 2023-04-17 ASSESSMENT — PAIN SCALES - GENERAL: PAINLEVEL_OUTOF10: 3

## 2023-04-17 ASSESSMENT — PAIN DESCRIPTION - PAIN TYPE: TYPE: ACUTE PAIN

## 2023-04-17 ASSESSMENT — PAIN DESCRIPTION - DESCRIPTORS: DESCRIPTORS: TIGHTNESS

## 2023-04-17 NOTE — PROGRESS NOTES
Diabetic Medication Management Program  [facility] [place] [facility]  [address]. [facility], [address]  Phone: [phone]  Fax: [phone]    NAME: Beth Palencia II  MEDICAL RECORD NUMBER:  [id number]  AGE: 70 y.o. GENDER: male  : 1960  EPISODE DATE:  2023     Mr. Denton was referred to [facility] by Perfecto Stanley CNP. Patient acknowledges working in consult agreement with clinical pharmacist and this provider. Goals per referral:   Fasting blood glucose: < 130  Peak postprandial glucose: < 180  A1C: < 7    SUBJECTIVE     Mr. Nataliia Washington is a 70 y.o. male here for the Diabetes Service for self-management education, medication review including over the counter medications and herbal products, overall wellbeing assessment, transition of care and any needed adjustments with updates and recommendations communicated to the referring physician. Patient Findings:   Medication changes  Yes: Ozepic 1mg started 4/3  Diet changes  Yes: Eating less lately  Activity changes  no  Emergency Room Visit or Hospitalization  no  Acute Illness/new problems  no  Symptoms of hypoglycemia  no - none  Symptoms of hyperglycemia  no - none  Medication adverse reactions  diarrhea, GI upset, and vomiting - see notes below - not sure if these symptoms are from Ozempic or underlying GI issue. OBJECTIVE     PMHx:    Past Medical History:   Diagnosis Date    Arthritis     BILATERAL Brinda Hopes right shoulder    Back pain     Colostomy RUQ 2020    Frequent headaches     10/28/2020 PATIENT STATES EVERY OTHER DAY. Gout     History of atrial fibrillation     AFTER SURGERY ON 2020 WENT INTO A FIB. CONVERTED BACK TO NORMAL RHYTHM ON HIS OWN.  CARDIOLOGIST DR Yvonne Carrasco    History of blood transfusion     NO REACTION    [place] (hard of hearing)     HTN     Rick Sandoval CNP    Hyperkalemia     Hyperlipidemia     Incisional hernia     Kidney infection     Sepsis ([facility] [address]     post

## 2023-04-17 NOTE — ED TRIAGE NOTES
Vomiting since this past Thursday into th weekend    Nausea, loose stool and abdominal pain x 1 week     States his hemia is larger that normal would like it looked at; called his surgeon and was told to come to hospital

## 2023-04-17 NOTE — ED PROVIDER NOTES
[facility] ED  Emergency Department Encounter  Emergency Medicine Resident     Pt Name:Prateek Gomez II  MRN: [id number]  [place] 1960  Date of evaluation: 4/17/23  PCP:  TORREY Love CNP  Note Started: 2:05 PM EDT      CHIEF COMPLAINT       Chief Complaint   Patient presents with    Nausea    Emesis    Diarrhea    Hernia     Wants it looked at        HISTORY OF PRESENT ILLNESS  (Location/Symptom, Timing/Onset, Context/Setting, Quality, Duration, Modifying Factors, Severity.)      Raissa Shahid is a pleasant 70 y.o. male who presents with 1 week history of diarrhea and vomiting. Patient significant GI history including colostomy, status post reversal in 2020, L radical nephrectomy, diabetes mellitus, on insulin and Ozempic. Patient did also receive a 10-day course of doxycycline 3/20 -3/30. Patient also has history of paroxysmal A-fib, is on Xarelto and diltiazem, as well as hypertension controlled on Toprol. As per patient, today after doubling his Ozempic dose [as instructed by PCP] experiencing multiple loose stools per day as well as multiple other emesis. He states that this is onset better but is still ongoing. Patient denies hematochezia, hematemesis, abdominal pain, dyspnea, chest pain, syncope, rash, fever, or chills    PAST MEDICAL / SURGICAL / SOCIAL / FAMILY HISTORY      has a past medical history of Arthritis, Back pain, Colostomy RUQ, Frequent headaches, Gout, History of atrial fibrillation, History of blood transfusion, [place] (hard of hearing), HTN, Hyperkalemia, Hyperlipidemia, Incisional hernia, Kidney infection, Sepsis ([address] Sleep apnea, T2DM, Tooth missing, Under care of team, Wears glasses, and Wellness examination. has a past surgical history that includes Ankle surgery (Right, 2010); Carpal tunnel release (Bilateral); Cystoscopy (Right, 09/01/2020); hc cath power picc triple (09/03/2020); Kidney surgery (Left, 09/02/2020);  Small

## 2023-04-17 NOTE — ED PROVIDER NOTES
[address] [facility]     Emergency Department     Faculty Attestation    I performed a history and physical examination of the patient and discussed management with the resident. I reviewed the residents note and agree with the documented findings and plan of care. Any areas of disagreement are noted on the chart. I was personally present for the key portions of any procedures. I have documented in the chart those procedures where I was not present during the key portions. I have reviewed the emergency nurses triage note. I agree with the chief complaint, past medical history, past surgical history, allergies, medications, social and family history as documented unless otherwise noted below. For Physician Assistant/ Nurse Practitioner cases/documentation I have personally evaluated this patient and have completed at least one if not all key elements of the E/M (history, physical exam, and MDM). Additional findings are as noted. I have personally seen and evaluated the patient. I find the patient's history and physical exam are consistent with the NP/PA documentation. I agree with the care provided, treatment rendered, disposition and follow-up plan. Describes loose stools watery in nature as well as vomiting for approximately 1 week has noted this is hernia midline hernia to become enlarged as well as abdominal distention he denies any abdominal pain whatsoever complicated surgical history noted in the past currently his abdomen is soft and completely nontender examination but is noted to be to phonetic and along with a reducible midline ventral hernia. Critical Care     Siomara Andino M.D.   Attending Emergency  Physician            Rosalio Landaverde MD  04/17/23 9948

## 2023-04-18 ENCOUNTER — ANESTHESIA EVENT (OUTPATIENT)
Dept: OPERATING ROOM | Age: 63
End: 2023-04-18
Payer: MEDICARE

## 2023-04-18 ENCOUNTER — ANESTHESIA (OUTPATIENT)
Dept: OPERATING ROOM | Age: 63
End: 2023-04-18
Payer: MEDICARE

## 2023-04-18 LAB
ALBUMIN SERPL-MCNC: 3.2 G/DL (ref 3.5–5.2)
ALBUMIN/GLOBULIN RATIO: 1.1 (ref 1–2.5)
ALP SERPL-CCNC: 63 U/L (ref 40–129)
ALT SERPL-CCNC: 17 U/L (ref 5–41)
ANION GAP SERPL CALCULATED.3IONS-SCNC: 7 MMOL/L (ref 9–17)
AST SERPL-CCNC: 17 U/L
BILIRUB SERPL-MCNC: 0.3 MG/DL (ref 0.3–1.2)
BUN SERPL-MCNC: 36 MG/DL (ref 8–23)
C DIFF GDH + TOXINS A+B STL QL IA.RAPID: NEGATIVE
CALCIUM SERPL-MCNC: 8.4 MG/DL (ref 8.6–10.4)
CHLORIDE SERPL-SCNC: 113 MMOL/L (ref 98–107)
CO2 SERPL-SCNC: 19 MMOL/L (ref 20–31)
CREAT SERPL-MCNC: 2.2 MG/DL (ref 0.7–1.2)
DATE, STOOL #1: NORMAL
EST. AVERAGE GLUCOSE BLD GHB EST-MCNC: 148 MG/DL
GFR SERPL CREATININE-BSD FRML MDRD: 32 ML/MIN/1.73M2
GLUCOSE BLD-MCNC: 100 MG/DL (ref 75–110)
GLUCOSE BLD-MCNC: 112 MG/DL (ref 75–110)
GLUCOSE BLD-MCNC: 116 MG/DL (ref 75–110)
GLUCOSE BLD-MCNC: 117 MG/DL (ref 75–110)
GLUCOSE BLD-MCNC: 143 MG/DL (ref 75–110)
GLUCOSE BLD-MCNC: 83 MG/DL (ref 75–110)
GLUCOSE BLD-MCNC: 99 MG/DL (ref 75–110)
GLUCOSE SERPL-MCNC: 111 MG/DL (ref 70–99)
HBA1C MFR BLD: 6.8 % (ref 4–6)
HEMOCCULT SP1 STL QL: NEGATIVE
LACTOFERRIN, QUAL: ABNORMAL
LIPASE SERPL-CCNC: 113 U/L (ref 13–60)
MAGNESIUM SERPL-MCNC: 2.1 MG/DL (ref 1.6–2.6)
PHOSPHATE SERPL-MCNC: 3.3 MG/DL (ref 2.5–4.5)
POTASSIUM SERPL-SCNC: 4 MMOL/L (ref 3.7–5.3)
PROT SERPL-MCNC: 6.1 G/DL (ref 6.4–8.3)
SODIUM SERPL-SCNC: 139 MMOL/L (ref 135–144)
SPECIMEN DESCRIPTION: NORMAL
TIME, STOOL #1: NORMAL

## 2023-04-18 PROCEDURE — 45330 DIAGNOSTIC SIGMOIDOSCOPY: CPT | Performed by: INTERNAL MEDICINE

## 2023-04-18 PROCEDURE — 6360000002 HC RX W HCPCS: Performed by: INTERNAL MEDICINE

## 2023-04-18 PROCEDURE — 3700000000 HC ANESTHESIA ATTENDED CARE: Performed by: INTERNAL MEDICINE

## 2023-04-18 PROCEDURE — 6370000000 HC RX 637 (ALT 250 FOR IP): Performed by: INTERNAL MEDICINE

## 2023-04-18 PROCEDURE — 83735 ASSAY OF MAGNESIUM: CPT

## 2023-04-18 PROCEDURE — 7100000010 HC PHASE II RECOVERY - FIRST 15 MIN: Performed by: INTERNAL MEDICINE

## 2023-04-18 PROCEDURE — 83036 HEMOGLOBIN GLYCOSYLATED A1C: CPT

## 2023-04-18 PROCEDURE — 6360000002 HC RX W HCPCS: Performed by: NURSE ANESTHETIST, CERTIFIED REGISTERED

## 2023-04-18 PROCEDURE — 2500000003 HC RX 250 WO HCPCS: Performed by: NURSE ANESTHETIST, CERTIFIED REGISTERED

## 2023-04-18 PROCEDURE — 36415 COLL VENOUS BLD VENIPUNCTURE: CPT

## 2023-04-18 PROCEDURE — 1200000000 HC SEMI PRIVATE

## 2023-04-18 PROCEDURE — 99254 IP/OBS CNSLTJ NEW/EST MOD 60: CPT | Performed by: INTERNAL MEDICINE

## 2023-04-18 PROCEDURE — 80053 COMPREHEN METABOLIC PANEL: CPT

## 2023-04-18 PROCEDURE — 7100000011 HC PHASE II RECOVERY - ADDTL 15 MIN: Performed by: INTERNAL MEDICINE

## 2023-04-18 PROCEDURE — 3700000001 HC ADD 15 MINUTES (ANESTHESIA): Performed by: INTERNAL MEDICINE

## 2023-04-18 PROCEDURE — 2580000003 HC RX 258: Performed by: HOSPITALIST

## 2023-04-18 PROCEDURE — 2580000003 HC RX 258: Performed by: NURSE ANESTHETIST, CERTIFIED REGISTERED

## 2023-04-18 PROCEDURE — 6360000002 HC RX W HCPCS: Performed by: HOSPITALIST

## 2023-04-18 PROCEDURE — 83690 ASSAY OF LIPASE: CPT

## 2023-04-18 PROCEDURE — 87506 IADNA-DNA/RNA PROBE TQ 6-11: CPT

## 2023-04-18 PROCEDURE — 3609027000 HC COLONOSCOPY: Performed by: INTERNAL MEDICINE

## 2023-04-18 PROCEDURE — 0DJD8ZZ INSPECTION OF LOWER INTESTINAL TRACT, VIA NATURAL OR ARTIFICIAL OPENING ENDOSCOPIC: ICD-10-PCS | Performed by: INTERNAL MEDICINE

## 2023-04-18 PROCEDURE — 84100 ASSAY OF PHOSPHORUS: CPT

## 2023-04-18 PROCEDURE — 82947 ASSAY GLUCOSE BLOOD QUANT: CPT

## 2023-04-18 PROCEDURE — 6370000000 HC RX 637 (ALT 250 FOR IP): Performed by: NURSE PRACTITIONER

## 2023-04-18 RX ORDER — SODIUM CHLORIDE 0.9 % (FLUSH) 0.9 %
5-40 SYRINGE (ML) INJECTION PRN
Status: CANCELLED | OUTPATIENT
Start: 2023-04-18

## 2023-04-18 RX ORDER — ASPIRIN 81 MG/1
81 TABLET ORAL NIGHTLY
Status: DISCONTINUED | OUTPATIENT
Start: 2023-04-18 | End: 2023-04-19 | Stop reason: HOSPADM

## 2023-04-18 RX ORDER — SODIUM CHLORIDE, SODIUM LACTATE, POTASSIUM CHLORIDE, CALCIUM CHLORIDE 600; 310; 30; 20 MG/100ML; MG/100ML; MG/100ML; MG/100ML
INJECTION, SOLUTION INTRAVENOUS CONTINUOUS PRN
Status: DISCONTINUED | OUTPATIENT
Start: 2023-04-18 | End: 2023-04-18 | Stop reason: SDUPTHER

## 2023-04-18 RX ORDER — SODIUM CHLORIDE 0.9 % (FLUSH) 0.9 %
5-40 SYRINGE (ML) INJECTION EVERY 12 HOURS SCHEDULED
Status: CANCELLED | OUTPATIENT
Start: 2023-04-18

## 2023-04-18 RX ORDER — SODIUM CHLORIDE 9 MG/ML
INJECTION, SOLUTION INTRAVENOUS PRN
Status: CANCELLED | OUTPATIENT
Start: 2023-04-18

## 2023-04-18 RX ORDER — INSULIN LISPRO 100 [IU]/ML
0-4 INJECTION, SOLUTION INTRAVENOUS; SUBCUTANEOUS
Status: DISCONTINUED | OUTPATIENT
Start: 2023-04-19 | End: 2023-04-19 | Stop reason: HOSPADM

## 2023-04-18 RX ORDER — LIDOCAINE HYDROCHLORIDE 10 MG/ML
INJECTION, SOLUTION EPIDURAL; INFILTRATION; INTRACAUDAL; PERINEURAL PRN
Status: DISCONTINUED | OUTPATIENT
Start: 2023-04-18 | End: 2023-04-18 | Stop reason: SDUPTHER

## 2023-04-18 RX ORDER — POLYETHYLENE GLYCOL 3350 17 G/17G
17 POWDER, FOR SOLUTION ORAL 2 TIMES DAILY
Status: DISCONTINUED | OUTPATIENT
Start: 2023-04-18 | End: 2023-04-19 | Stop reason: HOSPADM

## 2023-04-18 RX ORDER — PROPOFOL 10 MG/ML
INJECTION, EMULSION INTRAVENOUS PRN
Status: DISCONTINUED | OUTPATIENT
Start: 2023-04-18 | End: 2023-04-18 | Stop reason: SDUPTHER

## 2023-04-18 RX ADMIN — PROPOFOL 100 MG: 10 INJECTION, EMULSION INTRAVENOUS at 11:57

## 2023-04-18 RX ADMIN — FERROUS SULFATE TAB EC 325 MG (65 MG FE EQUIVALENT) 325 MG: 325 (65 FE) TABLET DELAYED RESPONSE at 09:12

## 2023-04-18 RX ADMIN — SODIUM CHLORIDE, POTASSIUM CHLORIDE, SODIUM LACTATE AND CALCIUM CHLORIDE: 600; 310; 30; 20 INJECTION, SOLUTION INTRAVENOUS at 11:55

## 2023-04-18 RX ADMIN — PROPOFOL 100 MG: 10 INJECTION, EMULSION INTRAVENOUS at 12:01

## 2023-04-18 RX ADMIN — Medication 10 MG: at 23:03

## 2023-04-18 RX ADMIN — DEXTROSE AND SODIUM CHLORIDE: 5; 450 INJECTION, SOLUTION INTRAVENOUS at 08:15

## 2023-04-18 RX ADMIN — MORPHINE SULFATE 2 MG: 4 INJECTION INTRAVENOUS at 23:03

## 2023-04-18 RX ADMIN — DILTIAZEM HYDROCHLORIDE 60 MG: 60 TABLET, FILM COATED ORAL at 09:13

## 2023-04-18 RX ADMIN — Medication 1000 UNITS: at 15:14

## 2023-04-18 RX ADMIN — PROPOFOL 175 MCG/KG/MIN: 10 INJECTION, EMULSION INTRAVENOUS at 12:05

## 2023-04-18 RX ADMIN — METOPROLOL TARTRATE 100 MG: 50 TABLET, FILM COATED ORAL at 20:11

## 2023-04-18 RX ADMIN — ALLOPURINOL 100 MG: 100 TABLET ORAL at 09:15

## 2023-04-18 RX ADMIN — POLYETHYLENE GLYCOL 3350 17 G: 17 POWDER, FOR SOLUTION ORAL at 17:13

## 2023-04-18 RX ADMIN — SODIUM BICARBONATE 650 MG: 648 TABLET ORAL at 20:11

## 2023-04-18 RX ADMIN — DILTIAZEM HYDROCHLORIDE 60 MG: 60 TABLET, FILM COATED ORAL at 20:11

## 2023-04-18 RX ADMIN — DULOXETINE 20 MG: 20 CAPSULE, DELAYED RELEASE ORAL at 15:21

## 2023-04-18 RX ADMIN — LIDOCAINE HYDROCHLORIDE 50 MG: 10 INJECTION, SOLUTION EPIDURAL; INFILTRATION; INTRACAUDAL; PERINEURAL at 11:59

## 2023-04-18 RX ADMIN — HYDROXYZINE HYDROCHLORIDE 50 MG: 25 TABLET ORAL at 20:11

## 2023-04-18 RX ADMIN — Medication 81 MG: at 20:11

## 2023-04-18 RX ADMIN — MORPHINE SULFATE 4 MG: 4 INJECTION INTRAVENOUS at 01:46

## 2023-04-18 RX ADMIN — METOPROLOL TARTRATE 100 MG: 50 TABLET, FILM COATED ORAL at 09:13

## 2023-04-18 RX ADMIN — INSULIN GLARGINE 20 UNITS: 100 INJECTION, SOLUTION SUBCUTANEOUS at 20:19

## 2023-04-18 RX ADMIN — SODIUM BICARBONATE 650 MG: 648 TABLET ORAL at 09:12

## 2023-04-18 RX ADMIN — PROPOFOL 100 MG: 10 INJECTION, EMULSION INTRAVENOUS at 12:02

## 2023-04-18 RX ADMIN — PRAVASTATIN SODIUM 20 MG: 20 TABLET ORAL at 09:12

## 2023-04-18 RX ADMIN — PROPOFOL 100 MG: 10 INJECTION, EMULSION INTRAVENOUS at 11:58

## 2023-04-18 ASSESSMENT — PAIN DESCRIPTION - ONSET: ONSET: ON-GOING

## 2023-04-18 ASSESSMENT — PAIN DESCRIPTION - DESCRIPTORS
DESCRIPTORS: ACHING
DESCRIPTORS: ACHING;SHARP

## 2023-04-18 ASSESSMENT — PAIN DESCRIPTION - ORIENTATION
ORIENTATION: MID;RIGHT
ORIENTATION: RIGHT

## 2023-04-18 ASSESSMENT — PAIN - FUNCTIONAL ASSESSMENT
PAIN_FUNCTIONAL_ASSESSMENT: 0-10
PAIN_FUNCTIONAL_ASSESSMENT: ACTIVITIES ARE NOT PREVENTED

## 2023-04-18 ASSESSMENT — PAIN SCALES - GENERAL
PAINLEVEL_OUTOF10: 0
PAINLEVEL_OUTOF10: 0
PAINLEVEL_OUTOF10: 7
PAINLEVEL_OUTOF10: 0
PAINLEVEL_OUTOF10: 2

## 2023-04-18 ASSESSMENT — PAIN DESCRIPTION - FREQUENCY: FREQUENCY: CONTINUOUS

## 2023-04-18 ASSESSMENT — PAIN DESCRIPTION - PAIN TYPE: TYPE: ACUTE PAIN

## 2023-04-18 ASSESSMENT — PAIN DESCRIPTION - LOCATION
LOCATION: ABDOMEN
LOCATION: ABDOMEN

## 2023-04-18 NOTE — ANESTHESIA PRE PROCEDURE
Department of Anesthesiology  Preprocedure Note       Name:  Mark Cheung II   Age:  70 y.o.  :  1960                                          MRN:  [id number]         Date:  2023      Surgeon: Renan Arteaga):  Fortino Musa MD    Procedure: Procedure(s):  ANAL PROCTO SIGMOIDOSCOPY FLEXIBLE    Medications prior to admission:   Prior to Admission medications    Medication Sig Start Date End Date Taking? Authorizing Provider   vitamin D (ERGOCALCIFEROL) 1.25 MG (95135 UT) CAPS capsule TAKE 1 CAPSULE BY MOUTH MONTHLY FOR 6 MONTHS 90 23   Historical Provider, MD NG ULTRAFINE III SHORT PEN 31G X 8 MM MISC  3/9/23   Historical Provider, MD   Semaglutide, 1 MG/DOSE, (OZEMPIC, 1 MG/DOSE,) 4 MG/3ML SOPN Inject 1 mg into the skin once a week 3/20/23   TORREY Colvin CNP   OZEMPIC, 0.25 OR 0.5 MG/DOSE, 2 MG/1.5ML SOPN INJECT 0.5 MG INTO THE SKIN ONCE A WEEK 3/7/23   TORREY Colvin CNP   cyclobenzaprine (FLEXERIL) 10 MG tablet TAKE 1 TABLET BY MOUTH 2 TIMES DAILY AS NEEDED FOR MUSCLE SPASMS. 3/7/23   TORREY Colvin CNP   insulin glargine (LANTUS SOLOSTAR) 100 UNIT/ML injection pen Inject 20 Units into the skin daily  Patient taking differently: Inject 20 Units into the skin nightly 3/7/23   TORREY Colvin CNP   furosemide (LASIX) 40 MG tablet Take 40 mg by mouth in the morning and 40 mg in the evening. As needed.     Historical Provider, MD   vitamin E 1000 units capsule Take 1,000 Units by mouth daily    Historical Provider, MD   DULoxetine (CYMBALTA) 20 MG extended release capsule TAKE 1 CAPSULE BY MOUTH EVERY DAY 23   TORREY Colvin CNP   hydrOXYzine HCl (ATARAX) 50 MG tablet TAKE 1 TABLET BY MOUTH EVERY DAY AT NIGHT 23   TORREY Colvin CNP   Lancets MISC 1 each by Does not apply route 3 times daily 1/3/23   TORREY Colvin CNP   Continuous Blood Gluc Sensor (FREESTYLE RANJIT 14 DAY SENSOR) MISC 1 each by Does not apply route

## 2023-04-18 NOTE — ED NOTES
Writer at bedside. Pt placed back on full cardiac monitor. Pt o2 98% on RA. Pt resp even and non labored. Pt has no requests at this time. Will continue with plan of care.      Karissa Hale RN  04/18/23 0005

## 2023-04-18 NOTE — ED NOTES
ED to inpatient nurses report      Chief Complaint:  Chief Complaint   Patient presents with    Nausea    Emesis    Diarrhea    Hernia     Wants it looked at      Present to ED from: home     MOA:     LOC: alert and orientated to name, place, date  Mobility: Independent  Oxygen Baseline: room air      Current needs required: GI work up, possible colonoscopy    Pending ED orders: no  Present condition: stable     Pertinent event(s): none       Mental Status:  Level of Consciousness: Alert (0)    Psych Assessment:   Psychosocial  Psychosocial (WDL): Within Defined Limits  Vital signs   Vitals:    04/17/23 1745 04/17/23 1800 04/17/23 1815 04/17/23 1830   BP: 122/84 126/86 (!) 122/90 (!) 139/93   Pulse:       Resp:       Temp:       TempSrc:       SpO2: 98% 98% 98% 99%   Weight:       Height:            Vitals:  Patient Vitals for the past 24 hrs:   BP Temp Temp src Pulse Resp SpO2 Height Weight   04/17/23 1830 (!) 139/93 -- -- -- -- 99 % -- --   04/17/23 1815 (!) 122/90 -- -- -- -- 98 % -- --   04/17/23 1800 126/86 -- -- -- -- 98 % -- --   04/17/23 1745 122/84 -- -- -- -- 98 % -- --   04/17/23 1743 122/84 -- -- -- -- -- -- --   04/17/23 1450 115/85 98.6 °F (37 °C) Oral 94 15 95 % 5' 11\" (1.803 m) 271 lb (122.9 kg)   04/17/23 1449 115/85 -- -- -- -- -- -- --      Visit Vitals  BP (!) 139/93   Pulse 94   Temp 98.6 °F (37 °C) (Oral)   Resp 15   Ht 5' 11\" (1.803 m)   Wt 271 lb (122.9 kg)   SpO2 99%   BMI 37.80 kg/m²        LDAs:   Peripheral IV 04/17/23 Distal;Left;Ventral Forearm (Active)       Ambulatory Status:  No data recorded    Diagnosis:  DISPOSITION Admitted 04/17/2023 08:31:37 PM   Final diagnoses:   Generalized abdominal pain        Code Status: [unfilled]     Consults:  [x]  Hospitalist  Completed  [x] yes [] no  []  Medicine  Completed  [] yes [] No  []  Cardiology  Completed  [] yes [] No  [x]  GI   Completed  [x] yes [] No  []  Neurology  Completed  [] yes [] No  []  Nephrology Completed  [] yes [] No  []

## 2023-04-18 NOTE — H&P
and 40 mg in the evening. As needed. Historical Provider, MD   vitamin E 1000 units capsule Take 1,000 Units by mouth daily    Historical Provider, MD   DULoxetine (CYMBALTA) 20 MG extended release capsule TAKE 1 CAPSULE BY MOUTH EVERY DAY 1/16/23   TORREY Resendiz CNP   hydrOXYzine HCl (ATARAX) 50 MG tablet TAKE 1 TABLET BY MOUTH EVERY DAY AT NIGHT 1/16/23   TORREY Resendiz CNP   Lancets MISC 1 each by Does not apply route 3 times daily 1/3/23   TORREY Resendiz CNP   Continuous Blood Gluc Sensor (FREESTYLE RANJIT 14 DAY SENSOR) MISC 1 each by Does not apply route continuous 12/20/22   TORREY Resendiz CNP   pravastatin (PRAVACHOL) 20 MG tablet TAKE 1 TABLET BY MOUTH EVERY DAY 12/1/22   TORREY Resendiz CNP   Cholecalciferol (VITAMIN D) 50 MCG (2000 UT) CAPS capsule Take by mouth 25,000 monthly    Historical Provider, MD   blood glucose test strips (GLUCOSE METER TEST) strip 1 each by In Vitro route 2 times daily As needed. 11/15/22   TORREY Resendiz CNP   Blood Glucose Monitoring Suppl ([employer]S BLOOD GLUCOSE METER) w/Device KIT 1 each by Does not apply route in the morning and at bedtime 11/15/22   TORREY Resendiz CNP   XARELTO 20 MG TABS tablet Take 1 tablet by mouth Daily with supper 8/20/22   Historical Provider, MD   dilTIAZem (CARDIZEM) 60 MG tablet Take 1 tablet by mouth in the morning and 1 tablet in the evening.  9/14/22   TORREY Resendiz CNP   sodium bicarbonate 650 MG tablet Take 650 mg by mouth 2 times daily 2 tablets twice daily    Historical Provider, MD   allopurinol (ZYLOPRIM) 100 MG tablet TAKE 1 TABLET BY MOUTH EVERY DAY 3/16/22   TORREY Resendiz CNP   simethicone (MYLICON) 80 MG chewable tablet Take 1 tablet by mouth 4 times daily as needed for Flatulence  Patient not taking: Reported on 3/20/2023 1/21/22   Mavis Spears MD   pantoprazole (PROTONIX) 40 MG tablet Take 1 tablet by mouth 2 times daily (before meals)  Patient

## 2023-04-18 NOTE — ED NOTES
Pt. Resting in bed, eyes closed, NAD. Pt resp even and non labored. Pt updated on bed status. Will continue with plan of care.      Eugenia Johnston RN  04/18/23 0025

## 2023-04-18 NOTE — CONSULTS
prostate are unremarkable. No lymphadenopathy or free fluid. Peritoneum/Retroperitoneum: No ascites or significant lymphadenopathy. Mild atherosclerotic disease without abdominal aortic aneurysm. Bones/Soft Tissues: Degenerative changes throughout the visualized spine. Extensive colonic distension with multiple air-fluid levels to the level of the sigmoid colon where there is marked luminal narrowing. Findings are suspicious for obstruction either due to a mass or a stricture and can be further evaluated with colonoscopy. XR ACUTE ABD SERIES CHEST 1 VW    Result Date: 4/17/2023  EXAMINATION: TWO XRAY VIEWS OF THE ABDOMEN AND SINGLE  XRAY VIEW OF THE CHEST 4/17/2023 3:24 pm COMPARISON: Chest radiograph on 01/04/2022 HISTORY: Abdominal distension. FINDINGS: Patient is slightly rotated. Cardiomediastinal silhouette and pulmonary vasculature are normal.  No consolidation, pleural effusion, or pneumothorax. No pneumoperitoneum. Distended bowel loops with differential air-fluid levels throughout the abdomen. Bowel gas is seen to the rectum. Degenerative changes throughout the visualized spine. CT     Distended bowel loops with differential air-fluid level throughout the abdomen. Recommend contrast-enhanced CT of the abdomen/pelvis for further evaluation. Thank you for the consultation. Will follow closely. Leeann Sweeney MD  4/17/2023, 8:07 PM  Trauma surgery resident PGY 2  I attest that I was present in the emergency department at [facility] with the resident and agree with the description of findings and plan as dictated above.   Shanthi Soria MD
LIVER-KIDNEYMICROSOMALAB    FARIBA  Lab Results   Component Value Date/Time    FARIBA NEGATIVE 09/28/2020 04:53 AM       AMA  No results found for: Charlynn Bolds    ASMA  No results found for: SMOOTHMUSCAB    Ceruloplasmin  No results found for: CERULOPLSM    Celiac panel  No results found for: TISSTRNTIIGG, TTGIGA, IGA    IgG  No results found for: IGG    IgM  No results found for: IGM    GGT   Lab Results   Component Value Date/Time    LABGGT 64 06/22/2020 01:53 PM       PT/INR  No results for input(s): PROTIME, INR in the last 72 hours. Cancer Markers:  CEA:  No results found for: CEA  Ca 125:  No results found for:   Ca 19-9:   No results found for:   AFP: No results found for: AFP    Lactic acid:No results for input(s): LACTACIDWB in the last 72 hours. IMAGING  CT ABDOMEN PELVIS WO CONTRAST Additional Contrast? None    Result Date: 4/17/2023  EXAMINATION: CT OF THE ABDOMEN AND PELVIS WITHOUT CONTRAST 4/17/2023 4:32 pm TECHNIQUE: CT of the abdomen and pelvis was performed without the administration of intravenous contrast. Multiplanar reformatted images are provided for review. Automated exposure control, iterative reconstruction, and/or weight based adjustment of the mA/kV was utilized to reduce the radiation dose to as low as reasonably achievable. COMPARISON: Correlation with earlier radiographs, CT on 09/28/2020 HISTORY: Abdominal distension. Suspected bowel obstruction. FINDINGS: Lower Chest: Dependent atelectasis at the lung bases. Organs: Left nephrectomy. Remaining solid abdominal organs and the gallbladder are unremarkable. GI/Bowel: Stomach is distended. Dilatation at a small bowel anastomosis in the mid right abdomen has increased from 2020. Distal small bowel appears normal.  Extensive colonic distension with multiple air-fluid levels to the level of the sigmoid colon where there is marked luminal narrowing. Pelvis: Bladder and prostate are unremarkable. No lymphadenopathy or free fluid.

## 2023-04-18 NOTE — ANESTHESIA POSTPROCEDURE EVALUATION
Department of Anesthesiology  Postprocedure Note    Patient: Reji Starr  MRN: [id number]  YOB: 1960  Date of evaluation: 4/18/2023      Procedure Summary     Date: 04/18/23 Room / Location: [address]    Anesthesia Start: 1155 Anesthesia Stop: 1233    Procedure: COLONOSCOPY DIAGNOSTIC Diagnosis:       Stricture of anal canal      Mass of anus      (STRICTURE VS MASS OF ANUS)    Surgeons: Calin Segura MD Responsible Provider: Heriberto Szymanski MD    Anesthesia Type: MAC ASA Status: 3          Anesthesia Type: MAC    Destiny Phase I:      Destiny Phase II:        Anesthesia Post Evaluation    Patient location during evaluation: bedside  Patient participation: complete - patient participated  Level of consciousness: awake  Airway patency: patent  Nausea & Vomiting: no nausea and no vomiting  Complications: no  Cardiovascular status: hemodynamically stable  Respiratory status: acceptable  Hydration status: stable  Comments: /89   Pulse 75   Temp 97.7 °F (36.5 °C) (Temporal)   Resp 18   Ht 5' 11\" (1.803 m)   Wt 271 lb (122.9 kg)   SpO2 100%   BMI 37.80 kg/m²

## 2023-04-19 VITALS
WEIGHT: 271 LBS | BODY MASS INDEX: 37.94 KG/M2 | SYSTOLIC BLOOD PRESSURE: 141 MMHG | TEMPERATURE: 98.8 F | DIASTOLIC BLOOD PRESSURE: 99 MMHG | RESPIRATION RATE: 15 BRPM | OXYGEN SATURATION: 94 % | HEIGHT: 71 IN | HEART RATE: 87 BPM

## 2023-04-19 LAB
ABSOLUTE EOS #: 0.46 K/UL (ref 0–0.44)
ABSOLUTE IMMATURE GRANULOCYTE: 0.08 K/UL (ref 0–0.3)
ABSOLUTE LYMPH #: 2.57 K/UL (ref 1.1–3.7)
ABSOLUTE MONO #: 0.93 K/UL (ref 0.1–1.2)
ANION GAP SERPL CALCULATED.3IONS-SCNC: 10 MMOL/L (ref 9–17)
BASOPHILS # BLD: 0 % (ref 0–2)
BASOPHILS ABSOLUTE: 0.03 K/UL (ref 0–0.2)
BUN SERPL-MCNC: 27 MG/DL (ref 8–23)
CALCIUM SERPL-MCNC: 8.6 MG/DL (ref 8.6–10.4)
CAMPYLOBACTER DNA SPEC NAA+PROBE: NORMAL
CHLORIDE SERPL-SCNC: 111 MMOL/L (ref 98–107)
CO2 SERPL-SCNC: 18 MMOL/L (ref 20–31)
CREAT SERPL-MCNC: 2.03 MG/DL (ref 0.7–1.2)
EOSINOPHILS RELATIVE PERCENT: 5 % (ref 1–4)
ETEC ELTA+ESTB GENES STL QL NAA+PROBE: NORMAL
GFR SERPL CREATININE-BSD FRML MDRD: 36 ML/MIN/1.73M2
GLUCOSE BLD-MCNC: 79 MG/DL (ref 75–110)
GLUCOSE SERPL-MCNC: 84 MG/DL (ref 70–99)
HCT VFR BLD AUTO: 44.7 % (ref 40.7–50.3)
HGB BLD-MCNC: 14 G/DL (ref 13–17)
IMMATURE GRANULOCYTES: 1 %
LYMPHOCYTES # BLD: 26 % (ref 24–43)
MCH RBC QN AUTO: 29.2 PG (ref 25.2–33.5)
MCHC RBC AUTO-ENTMCNC: 31.3 G/DL (ref 28.4–34.8)
MCV RBC AUTO: 93.1 FL (ref 82.6–102.9)
MONOCYTES # BLD: 10 % (ref 3–12)
NRBC AUTOMATED: 0 PER 100 WBC
P SHIGELLOIDES DNA STL QL NAA+PROBE: NORMAL
PDW BLD-RTO: 14.4 % (ref 11.8–14.4)
PLATELET # BLD AUTO: 212 K/UL (ref 138–453)
PMV BLD AUTO: 9.9 FL (ref 8.1–13.5)
POTASSIUM SERPL-SCNC: 4.6 MMOL/L (ref 3.7–5.3)
RBC # BLD: 4.8 M/UL (ref 4.21–5.77)
SALMONELLA DNA SPEC QL NAA+PROBE: NORMAL
SEG NEUTROPHILS: 58 % (ref 36–65)
SEGMENTED NEUTROPHILS ABSOLUTE COUNT: 5.73 K/UL (ref 1.5–8.1)
SHIGA TOXIN STX GENE SPEC NAA+PROBE: NORMAL
SHIGELLA DNA SPEC QL NAA+PROBE: NORMAL
SODIUM SERPL-SCNC: 139 MMOL/L (ref 135–144)
SPECIMEN DESCRIPTION: NORMAL
V CHOL+PARA RFBL+TRKH+TNAA STL QL NAA+PR: NORMAL
WBC # BLD AUTO: 9.8 K/UL (ref 3.5–11.3)
Y ENTERO RECN STL QL NAA+PROBE: NORMAL

## 2023-04-19 PROCEDURE — 6370000000 HC RX 637 (ALT 250 FOR IP): Performed by: INTERNAL MEDICINE

## 2023-04-19 PROCEDURE — 82947 ASSAY GLUCOSE BLOOD QUANT: CPT

## 2023-04-19 PROCEDURE — 80048 BASIC METABOLIC PNL TOTAL CA: CPT

## 2023-04-19 PROCEDURE — 99231 SBSQ HOSP IP/OBS SF/LOW 25: CPT | Performed by: NURSE PRACTITIONER

## 2023-04-19 PROCEDURE — 2580000003 HC RX 258: Performed by: INTERNAL MEDICINE

## 2023-04-19 PROCEDURE — 99231 SBSQ HOSP IP/OBS SF/LOW 25: CPT | Performed by: INTERNAL MEDICINE

## 2023-04-19 PROCEDURE — 85025 COMPLETE CBC W/AUTO DIFF WBC: CPT

## 2023-04-19 PROCEDURE — 36415 COLL VENOUS BLD VENIPUNCTURE: CPT

## 2023-04-19 RX ADMIN — FERROUS SULFATE TAB EC 325 MG (65 MG FE EQUIVALENT) 325 MG: 325 (65 FE) TABLET DELAYED RESPONSE at 08:38

## 2023-04-19 RX ADMIN — Medication 1000 UNITS: at 08:37

## 2023-04-19 RX ADMIN — SODIUM CHLORIDE, PRESERVATIVE FREE 10 ML: 5 INJECTION INTRAVENOUS at 08:43

## 2023-04-19 RX ADMIN — DULOXETINE 20 MG: 20 CAPSULE, DELAYED RELEASE ORAL at 08:38

## 2023-04-19 RX ADMIN — SODIUM BICARBONATE 650 MG: 648 TABLET ORAL at 08:37

## 2023-04-19 RX ADMIN — METOPROLOL TARTRATE 100 MG: 50 TABLET, FILM COATED ORAL at 08:42

## 2023-04-19 RX ADMIN — ALLOPURINOL 100 MG: 100 TABLET ORAL at 08:38

## 2023-04-19 RX ADMIN — DILTIAZEM HYDROCHLORIDE 60 MG: 60 TABLET, FILM COATED ORAL at 08:42

## 2023-04-19 RX ADMIN — PRAVASTATIN SODIUM 20 MG: 20 TABLET ORAL at 08:37

## 2023-04-19 NOTE — PROGRESS NOTES
Physician Progress Note      PATIENT:               Varghese Prieto  CSN #:                  [id number]  :                       1960  ADMIT DATE:       2023 2:03 PM  [address] [place] DATE:  RESPONDING  PROVIDER #:        Padma Andrews MD          QUERY TEXT:    Patient admitted with large bowel obstruction. Per H&P noted ckd with renal   function at baseline. creatinine 2.65>2.20 GFR of 32. last creatinine prior to   admission 2022 of 2.68. Surgery progress note  notes DEBBI. Please   clarify one of the following; The medical record reflects the following:  Risk Factors:age, history of nephrectomy, bowel obstruction, history of ckd  Clinical Indicators:admitted with large bowel obstruction. Per H&P noted ckd   with renal function at baseline. creatinine 2.65>2.20 GFR of 32. last   creatinine prior to admission 2022 of 2.68. Surgery progress note    notes AK  Treatment: lab monitoring, iv fluids    Thank Lo Abrams RN BSN  CCDS  Email [email or web address]  Cell [phone]  office hours M-F 6am to 2:30pm  Options provided:  -- CKD Stage 3a GFR 45-59, DEBBI ruled out after study  -- CKD Stage 3b GFR 30-44, DEBBI ruled out after study  -- CKD Stage 4 GFR 15-29, DEBBI ruled out after study  -- DEBBI on CKD stage 3a  -- DEBBI on CKD stage 3b  -- Other - I will add my own diagnosis  -- Disagree - Not applicable / Not valid  -- Disagree - Clinically unable to determine / Unknown  -- Refer to Clinical Documentation Reviewer    PROVIDER RESPONSE TEXT:    This patient has CKD Stage 3b. DEBBI ruled out after study. Query created by:  Becky Qureshi on 2023 9:18 AM      Electronically signed by:  Padma Andrews MD 2023 3:33 PM
[facility]  Office: [address], DO, Bruce Carrolls, DO, Fei Conrad, DO, Ivory Rubalcava Blood, DO, An Garsia MD, Zen Roque MD, Marcie Eaton MD, Rosario Alvarado MD,  Renetta Cortez MD, Eitan Flores MD, Viraj Mixon, DO, Rebecca Wagner MD,  Paz Quinn MD, Flavio Chin MD, Krystina Kendrick, DO, Gita Fernandez MD, Moe Walton MD, Sai Dean, DO, Jun Shah MD, Wilber Boyle MD, Bryanna Ruiz MD, Verónica Schmidt MD,  Nitish Valiente DO, Anny Ho MD,  Marylen Baumann, BRIT,  Alpesh Hess, CNP, Dejon Marshall, CNP, Ana Stearns, CNP,  Dayton Grey, [facility], Priya Parsons, CNP, Mary Vazquez, CNP, Evelyn Calderón, CNP, Esther Pizarro, CNP, Nancy Brown, CNP, Khalif Holley PA-C, Leeann Rodriguez, CNS, Abhay Foster, CNP, Esther Bentley, CNP         [facility] [place]    Progress Note    4/19/2023    8:15 AM    Name:   Arelia Lesch II  MRN:     [id number]     Acct:      [de-identified]   Room:   [address] [place] [address] Day:  2  Admit Date:  4/17/2023  2:03 PM    PCP:   TROREY Hammond CNP  Code Status:  Full Code    Subjective:     C/C:   Chief Complaint   Patient presents with    Nausea    Emesis    Diarrhea    Hernia     Wants it looked at      Interval History Status: improved. Seen and examined at bedside. No overnight events. Denies pain this morning. Had small BM yesterday post-colonoscopy. (+) flatus. Tolerating regular diet. Colonoscopy 4/18:   Normal sigmoid colon. No evidence of sigmoid mass or stricture. Dilation of the colon near the colocolonic anastomosis at the transverse colon. This was decompressed. Brief History:     Per HPI:     Patient symptoms started about 6 days ago with diarrhea and abdominal pain. Patient had no appetite and was unable to eat anything since Thursday to Sunday.   Patient then finally ate something today and patient was having
appear to be completely obstructed.  No acute surgical intervention is indicated at this point  GI following - tapwater enema and colonoscopy performed yesterday  Revealed normal sigmoid colon with no evidence of mass or stricture  Dilation of colon near colocolonic anastomosis at transverse colon  Ok for discharge from general surgery standpoint      Electronically signed by Jaycob Gatica on 4/19/2023 at 5:39 AM
primary team  Patient is clinically improved, tolerated regular and no abnormality finding per GI via colonoscopy.  [id number] for discharge from general surgery standpoint      Electronically signed by Cali Mccabe DO on 4/19/2023 at 6:37 AM
stool culture for infectious etiology  Remain NPO      Electronically signed by Sharron Barroso DO on 4/18/2023 at 6:35 AM
rate and rhythm, no murmur  Abdomen:  soft, nontender, nondistended, normal bowel sounds, no masses, hepatomegaly, splenomegaly  Extremities:  no edema, redness, tenderness in the calves  Skin:  no gross lesions, rashes, induration    Assessment:        Hospital Problems             Last Modified POA    * (Principal) Large bowel obstruction ([address] 4/17/2023 Yes    Essential hypertension 4/17/2023 Yes    Morbid obesity due to excess calories ([address] 4/17/2023 Yes    Chronic kidney disease (CKD) 4/17/2023 Yes    Sleep apnea 4/17/2023 Yes    Incisional hernia, without obstruction or gangrene (Chronic) 4/17/2023 Yes    Type 2 diabetes mellitus with diabetic nephropathy, with long-term current use of insulin ([address] 4/17/2023 Yes       Plan:        R/O bowel obstruction: General surgery following. Maintain NPO. Pending GI input for possible colonoscopy. Large incisional hernia: Follows with gen surgery outpatient. Unable to have surgery until hgb A1C improves. HTN: Stable. Will resume home medications when able. CKD: At baseline. Continue to trend kidney function. Avoid nephrotoxic agents. DM: Accu checks ac/hs with SSI coverage while NPO. Will resume home medications when tolerating diet. LISA: Home Cpap  Healthy weight loss and weight management--education and PCP to follow.      TORREY Lujan NP  4/18/2023  8:09 AM
you for allowing me to participate in the care of your patient. Wil Mitchell, APRN - CNP on 4/19/2023 at [address] [facility]    Please note that this note was generated using a voice recognition dictation software. Although every effort was made to ensure the accuracy of this automated transcription, some errors in transcription may have occurred.

## 2023-04-19 NOTE — PLAN OF CARE
Problem: Discharge Planning  Goal: Discharge to home or other facility with appropriate resources  Outcome: Adequate for Discharge     Problem: Pain  Goal: Verbalizes/displays adequate comfort level or baseline comfort level  Outcome: Adequate for Discharge     Problem: Safety - Adult  Goal: Free from fall injury  Outcome: Adequate for Discharge     Problem: Chronic Conditions and Co-morbidities  Goal: Patient's chronic conditions and co-morbidity symptoms are monitored and maintained or improved  Outcome: Adequate for Discharge     Problem: Gastrointestinal - Adult  Goal: Minimal or absence of nausea and vomiting  Outcome: Adequate for Discharge  Goal: Maintains or returns to baseline bowel function  Outcome: Adequate for Discharge     Problem: Metabolic/Fluid and Electrolytes - Adult  Goal: Electrolytes maintained within normal limits  Outcome: Adequate for Discharge

## 2023-05-03 DIAGNOSIS — F51.02 ADJUSTMENT INSOMNIA: ICD-10-CM

## 2023-05-04 RX ORDER — ZALEPLON 10 MG/1
CAPSULE ORAL
Qty: 90 CAPSULE | Refills: 0 | Status: SHIPPED | OUTPATIENT
Start: 2023-05-04 | End: 2023-08-02

## 2023-05-10 ENCOUNTER — OFFICE VISIT (OUTPATIENT)
Dept: FAMILY MEDICINE CLINIC | Age: 63
End: 2023-05-10
Payer: COMMERCIAL

## 2023-05-10 VITALS
TEMPERATURE: 97.4 F | DIASTOLIC BLOOD PRESSURE: 76 MMHG | BODY MASS INDEX: 38.47 KG/M2 | HEIGHT: 71 IN | WEIGHT: 274.8 LBS | OXYGEN SATURATION: 90 % | HEART RATE: 71 BPM | SYSTOLIC BLOOD PRESSURE: 118 MMHG

## 2023-05-10 DIAGNOSIS — Z00.00 INITIAL MEDICARE ANNUAL WELLNESS VISIT: Primary | ICD-10-CM

## 2023-05-10 DIAGNOSIS — K45.8 OTHER SPECIFIED ABDOMINAL HERNIA WITHOUT OBSTRUCTION OR GANGRENE: ICD-10-CM

## 2023-05-10 DIAGNOSIS — H93.91 EAR PROBLEM, RIGHT: ICD-10-CM

## 2023-05-10 DIAGNOSIS — R19.7 DIARRHEA, UNSPECIFIED TYPE: ICD-10-CM

## 2023-05-10 DIAGNOSIS — R93.5 ABNORMAL CT OF THE ABDOMEN: ICD-10-CM

## 2023-05-10 PROCEDURE — 3017F COLORECTAL CA SCREEN DOC REV: CPT | Performed by: NURSE PRACTITIONER

## 2023-05-10 PROCEDURE — 1111F DSCHRG MED/CURRENT MED MERGE: CPT | Performed by: NURSE PRACTITIONER

## 2023-05-10 PROCEDURE — G0438 PPPS, INITIAL VISIT: HCPCS | Performed by: NURSE PRACTITIONER

## 2023-05-10 PROCEDURE — G8427 DOCREV CUR MEDS BY ELIG CLIN: HCPCS | Performed by: NURSE PRACTITIONER

## 2023-05-10 PROCEDURE — G8417 CALC BMI ABV UP PARAM F/U: HCPCS | Performed by: NURSE PRACTITIONER

## 2023-05-10 PROCEDURE — 1123F ACP DISCUSS/DSCN MKR DOCD: CPT | Performed by: NURSE PRACTITIONER

## 2023-05-10 PROCEDURE — 1036F TOBACCO NON-USER: CPT | Performed by: NURSE PRACTITIONER

## 2023-05-10 PROCEDURE — 3074F SYST BP LT 130 MM HG: CPT | Performed by: NURSE PRACTITIONER

## 2023-05-10 PROCEDURE — 3078F DIAST BP <80 MM HG: CPT | Performed by: NURSE PRACTITIONER

## 2023-05-10 PROCEDURE — 99213 OFFICE O/P EST LOW 20 MIN: CPT | Performed by: NURSE PRACTITIONER

## 2023-05-10 SDOH — ECONOMIC STABILITY: FOOD INSECURITY: WITHIN THE PAST 12 MONTHS, THE FOOD YOU BOUGHT JUST DIDN'T LAST AND YOU DIDN'T HAVE MONEY TO GET MORE.: NEVER TRUE

## 2023-05-10 SDOH — ECONOMIC STABILITY: HOUSING INSECURITY
IN THE LAST 12 MONTHS, WAS THERE A TIME WHEN YOU DID NOT HAVE A STEADY PLACE TO SLEEP OR SLEPT IN A SHELTER (INCLUDING NOW)?: NO

## 2023-05-10 SDOH — ECONOMIC STABILITY: FOOD INSECURITY: WITHIN THE PAST 12 MONTHS, YOU WORRIED THAT YOUR FOOD WOULD RUN OUT BEFORE YOU GOT MONEY TO BUY MORE.: NEVER TRUE

## 2023-05-10 SDOH — ECONOMIC STABILITY: INCOME INSECURITY: HOW HARD IS IT FOR YOU TO PAY FOR THE VERY BASICS LIKE FOOD, HOUSING, MEDICAL CARE, AND HEATING?: NOT HARD AT ALL

## 2023-05-10 ASSESSMENT — LIFESTYLE VARIABLES
HOW OFTEN DURING THE LAST YEAR HAVE YOU NEEDED AN ALCOHOLIC DRINK FIRST THING IN THE MORNING TO GET YOURSELF GOING AFTER A NIGHT OF HEAVY DRINKING: 0
HOW OFTEN DURING THE LAST YEAR HAVE YOU FAILED TO DO WHAT WAS NORMALLY EXPECTED FROM YOU BECAUSE OF DRINKING: 0
HAVE YOU OR SOMEONE ELSE BEEN INJURED AS A RESULT OF YOUR DRINKING: 0
HOW OFTEN DURING THE LAST YEAR HAVE YOU FOUND THAT YOU WERE NOT ABLE TO STOP DRINKING ONCE YOU HAD STARTED: 0
HOW MANY STANDARD DRINKS CONTAINING ALCOHOL DO YOU HAVE ON A TYPICAL DAY: 1 OR 2
HAS A RELATIVE, FRIEND, DOCTOR, OR ANOTHER HEALTH PROFESSIONAL EXPRESSED CONCERN ABOUT YOUR DRINKING OR SUGGESTED YOU CUT DOWN: 0
HOW OFTEN DURING THE LAST YEAR HAVE YOU HAD A FEELING OF GUILT OR REMORSE AFTER DRINKING: 0
HOW OFTEN DURING THE LAST YEAR HAVE YOU BEEN UNABLE TO REMEMBER WHAT HAPPENED THE NIGHT BEFORE BECAUSE YOU HAD BEEN DRINKING: 0
HOW OFTEN DO YOU HAVE A DRINK CONTAINING ALCOHOL: MONTHLY OR LESS

## 2023-05-10 ASSESSMENT — ENCOUNTER SYMPTOMS
NAUSEA: 0
CHEST TIGHTNESS: 0
SHORTNESS OF BREATH: 0
VOMITING: 0
DIARRHEA: 1
CONSTIPATION: 0
ABDOMINAL PAIN: 1
BLOOD IN STOOL: 0
COLOR CHANGE: 0
COUGH: 1

## 2023-05-10 ASSESSMENT — PATIENT HEALTH QUESTIONNAIRE - PHQ9
2. FEELING DOWN, DEPRESSED OR HOPELESS: 0
SUM OF ALL RESPONSES TO PHQ QUESTIONS 1-9: 0
SUM OF ALL RESPONSES TO PHQ9 QUESTIONS 1 & 2: 0
SUM OF ALL RESPONSES TO PHQ QUESTIONS 1-9: 0
1. LITTLE INTEREST OR PLEASURE IN DOING THINGS: 0
SUM OF ALL RESPONSES TO PHQ QUESTIONS 1-9: 0
SUM OF ALL RESPONSES TO PHQ QUESTIONS 1-9: 0

## 2023-05-10 NOTE — PATIENT INSTRUCTIONS
Do not smoke. If you need help quitting, talk to your doctor about stop-smoking programs and medicines. These can increase your chances of quitting for good. Quitting smoking may be the most important step you can take to protect your heart. It is never too late to quit.     Limit alcohol to 2 drinks a day for men and 1 drink a day for women. Too much alcohol can cause health problems.     Manage other health problems such as diabetes, high blood pressure, and high cholesterol. If you think you may have a problem with alcohol or drug use, talk to your doctor. Medicines    Take your medicines exactly as prescribed. Call your doctor if you think you are having a problem with your medicine.     If your doctor recommends aspirin, take the amount directed each day. Make sure you take aspirin and not another kind of pain reliever, such as acetaminophen (Tylenol). When should you call for help? Call 911 if you have symptoms of a heart attack. These may include:    Chest pain or pressure, or a strange feeling in the chest.     Sweating.     Shortness of breath.     Pain, pressure, or a strange feeling in the back, neck, jaw, or upper belly or in one or both shoulders or arms.     Lightheadedness or sudden weakness.     A fast or irregular heartbeat. After you call 911, the  may tell you to chew 1 adult-strength or 2 to 4 low-dose aspirin. Wait for an ambulance. Do not try to drive yourself. Watch closely for changes in your health, and be sure to contact your doctor if you have any problems. Where can you learn more? Go to http://www.abebe.com/ and enter F075 to learn more about \"A Healthy Heart: Care Instructions. \"  Current as of: September 7, 2022               Content Version: 13.6  © 4899-4768 Healthwise, Incorporated. Care instructions adapted under license by [facility] ([facility]).  If you have questions about a medical condition or this instruction, always ask your healthcare professional.

## 2023-05-10 NOTE — PROGRESS NOTES
Visit Information    Have you changed or started any medications since your last visit including any over-the-counter medicines, vitamins, or herbal medicines? no   Have you stopped taking any of your medications? Is so, why? -  no  Are you having any side effects from any of your medications? - no    Have you seen any other physician or provider since your last visit?  no   Have you had any other diagnostic tests since your last visit?  no   Have you been seen in the emergency room and/or had an admission in a hospital since we last saw you?  no   Have you had your routine dental cleaning in the past 6 months?  no     Do you have an active MyChart account? If no, what is the barrier?   Yes    Patient Care Team:  TORREY Morgan CNP as PCP - General (Family Nurse Practitioner)  TORREY Morgan CNP as PCP - Empaneled Provider  Marcellus Green MD as Consulting Physician (Gastroenterology)    Medical History Review  Past Medical, Family, and Social History reviewed and  contribute to the patient presenting condition    Health Maintenance   Topic Date Due    COVID-19 Vaccine (1) Never done    Diabetic retinal exam  Never done    DTaP/Tdap/Td vaccine (1 - Tdap) Never done    Shingles vaccine (1 of 2) Never done    Diabetic foot exam  02/01/2022    Depression Screen  05/03/2023    Flu vaccine (Season Ended) 08/01/2023    Diabetic Alb to Cr ratio (uACR) test  12/19/2023    Lipids  12/19/2023    A1C test (Diabetic or Prediabetic)  04/18/2024    GFR test (Diabetes, CKD 3-4, OR last GFR 15-59)  04/19/2024    Colorectal Cancer Screen  04/18/2033    Pneumococcal 65+ years Vaccine  Completed    Hepatitis C screen  Completed    HIV screen  Completed    Hepatitis A vaccine  Aged Out    Hib vaccine  Aged Out    Meningococcal (ACWY) vaccine  Aged Out    Pneumococcal 0-64 years Vaccine  Discontinued    Prostate Specific Antigen (PSA) Screening or Monitoring  Discontinued
or treatment          Weight and Activity:  Physical Activity: Insufficiently Active    Days of Exercise per Week: 4 days    Minutes of Exercise per Session: 10 min     On average, how many days per week do you engage in moderate to strenuous exercise (like a brisk walk)?: 4 days (walks at work)  Have you lost any weight without trying in the past 3 months?: No  Body mass index is 38.33 kg/m². (!) Abnormal  Obesity Interventions:  Patient declines any further evaluation or treatment          Dentist Screen:  Have you seen the dentist within the past year?: (!) No    Intervention:  Advised to schedule with their dentist     Vision Screen:  Do you have difficulty driving, watching TV, or doing any of your daily activities because of your eyesight?: No  Have you had an eye exam within the past year?: (!) No  No results found. Interventions:   Patient encouraged to make appointment with their eye specialist      Advanced Directives:  Do you have a Living Will?: (!) No    Intervention:  has NO advanced directive - not interested in additional information                       Objective   Vitals:    05/10/23 1510   BP: 118/76   Site: Left Upper Arm   Position: Sitting   Cuff Size: Large Adult   Pulse: 71   Temp: 97.4 °F (36.3 °C)   TempSrc: Tympanic   SpO2: 90%   Weight: 274 lb 12.8 oz (124.6 kg)   Height: 5' 11\" (1.803 m)      Body mass index is 38.33 kg/m². Allergies   Allergen Reactions    Ampicillin Swelling     Swelling of throat. Pcn [Penicillins] Swelling     Throat swelling. Tolerated cefepime during 8/31/20 admission. Sulfa Antibiotics      Other reaction(s): Unknown    Tape [Adhesive Tape] Other (See Comments)     CAUSES REDNESS. PAPER TAPE OK. Prior to Visit Medications    Medication Sig Taking?  Authorizing Provider   zaleplon (SONATA) 10 MG capsule TAKE 1 CAPSULE BY MOUTH NIGHTLY  Maribel Chapin APRN - CNP   vitamin D (ERGOCALCIFEROL) 1.25 MG (78051 UT) CAPS capsule TAKE 1

## 2023-05-16 ENCOUNTER — HOSPITAL ENCOUNTER (OUTPATIENT)
Dept: PHARMACY | Age: 63
Setting detail: THERAPIES SERIES
Discharge: HOME | End: 2023-05-16
Payer: COMMERCIAL

## 2023-05-16 VITALS
DIASTOLIC BLOOD PRESSURE: 89 MMHG | BODY MASS INDEX: 37.53 KG/M2 | OXYGEN SATURATION: 96 % | SYSTOLIC BLOOD PRESSURE: 152 MMHG | HEART RATE: 83 BPM | WEIGHT: 269.1 LBS | TEMPERATURE: 98 F

## 2023-05-16 PROCEDURE — 99212 OFFICE O/P EST SF 10 MIN: CPT

## 2023-05-16 RX ORDER — INSULIN GLARGINE 100 [IU]/ML
INJECTION, SOLUTION SUBCUTANEOUS
Qty: 5 ADJUSTABLE DOSE PRE-FILLED PEN SYRINGE | Refills: 1 | Status: SHIPPED | OUTPATIENT
Start: 2023-05-16

## 2023-05-16 NOTE — PROGRESS NOTES
Diabetic Medication Management Program  [facility] - INPATIENT  [address] [place], [address]  Phone: [phone]  Fax: [phone]    NAME: Christina Mcdonald II  MEDICAL RECORD NUMBER:  [id number]  AGE: 70 y.o. GENDER: male  : 1960  EPISODE DATE:  2023     Mr. Denton was referred to [facility] by Sandra Mario CNP. Patient acknowledges working in consult agreement with clinical pharmacist and this provider. Goals per referral:   Fasting blood glucose: < 130  Peak postprandial glucose: < 180  A1C: < 7    SUBJECTIVE     Mr. Craig Loyola is a 70 y.o. male here for the Diabetes Service for self-management education, medication review including over the counter medications and herbal products, overall wellbeing assessment, transition of care and any needed adjustments with updates and recommendations communicated to the referring physician. Patient Findings:   Medication changes  Yes: holding Ozempic  Diet changes  Yes: different than usual work routine while off   Activity changes  Yes: Less walking while off work  Emergency Room Visit or Hospitalization  Yes: Bowel obstruction inpatient STA -  Acute Illness/new problems  Yes: As above - needs f/u for complete colonoscopy and then hernia repair surgery  Symptoms of hypoglycemia  no - none  Symptoms of hyperglycemia  no - none  Medication adverse reactions  diarrhea - possibly from Ozempic    Comments: Off Ozempic 2 weeks - less appetite. Continues to have diarrhea. OBJECTIVE     PMHx:    Past Medical History:   Diagnosis Date    Arthritis     BILATERAL Mayuri Fotnanez right shoulder    Back pain     Colostomy RUQ 2020    Frequent headaches     10/28/2020 PATIENT STATES EVERY OTHER DAY. Gout     History of atrial fibrillation     AFTER SURGERY ON 2020 WENT INTO A FIB. CONVERTED BACK TO NORMAL RHYTHM ON HIS OWN.  CARDIOLOGIST DR STEIN    History of blood transfusion     NO

## 2023-05-30 DIAGNOSIS — E78.1 HYPERTRIGLYCERIDEMIA: ICD-10-CM

## 2023-05-30 RX ORDER — PRAVASTATIN SODIUM 20 MG
TABLET ORAL
Qty: 90 TABLET | Refills: 1 | Status: SHIPPED | OUTPATIENT
Start: 2023-05-30

## 2023-05-31 ENCOUNTER — APPOINTMENT (OUTPATIENT)
Dept: PHARMACY | Age: 63
End: 2023-05-31
Payer: COMMERCIAL

## 2023-06-01 ENCOUNTER — HOSPITAL ENCOUNTER (OUTPATIENT)
Dept: PHARMACY | Age: 63
Discharge: HOME | End: 2023-06-01

## 2023-06-01 NOTE — PROGRESS NOTES
Diabetes Medication Management Telephone Follow-Up     Blood glucose readings and trends: Meeting CGM goals        Medication changes  Requested call back to confirm starting Ozempic and if patient found supplementing Metamucil helpful    Plan: Continue current regimen as BG levels are very stable    Next follow-up: As scheduled

## 2023-06-13 DIAGNOSIS — E11.9 TYPE 2 DIABETES MELLITUS WITHOUT COMPLICATION, UNSPECIFIED WHETHER LONG TERM INSULIN USE (HCC): ICD-10-CM

## 2023-06-13 DIAGNOSIS — E11.65 UNCONTROLLED TYPE 2 DIABETES MELLITUS WITH HYPERGLYCEMIA (HCC): ICD-10-CM

## 2023-06-13 RX ORDER — FLASH GLUCOSE SENSOR
1 KIT MISCELLANEOUS CONTINUOUS
Qty: 2 EACH | Refills: 5 | Status: SHIPPED | OUTPATIENT
Start: 2023-06-13

## 2023-06-21 ENCOUNTER — APPOINTMENT (OUTPATIENT)
Dept: CT IMAGING | Age: 63
DRG: 377 | End: 2023-06-21
Payer: COMMERCIAL

## 2023-06-21 ENCOUNTER — APPOINTMENT (OUTPATIENT)
Dept: GENERAL RADIOLOGY | Age: 63
DRG: 377 | End: 2023-06-21
Payer: COMMERCIAL

## 2023-06-21 ENCOUNTER — HOSPITAL ENCOUNTER (INPATIENT)
Age: 63
LOS: 5 days | Discharge: HOME | DRG: 377 | End: 2023-06-26
Attending: EMERGENCY MEDICINE | Admitting: INTERNAL MEDICINE
Payer: COMMERCIAL

## 2023-06-21 ENCOUNTER — ANESTHESIA (OUTPATIENT)
Dept: OPERATING ROOM | Age: 63
End: 2023-06-21
Payer: COMMERCIAL

## 2023-06-21 ENCOUNTER — ANESTHESIA EVENT (OUTPATIENT)
Dept: OPERATING ROOM | Age: 63
End: 2023-06-21
Payer: COMMERCIAL

## 2023-06-21 DIAGNOSIS — K92.2 UPPER GI BLEED: Primary | ICD-10-CM

## 2023-06-21 DIAGNOSIS — N18.9 ACUTE KIDNEY INJURY SUPERIMPOSED ON CKD (HCC): ICD-10-CM

## 2023-06-21 DIAGNOSIS — D50.0 BLOOD LOSS ANEMIA: ICD-10-CM

## 2023-06-21 DIAGNOSIS — E87.20 LACTIC ACID ACIDOSIS: ICD-10-CM

## 2023-06-21 DIAGNOSIS — N17.9 ACUTE KIDNEY INJURY SUPERIMPOSED ON CKD (HCC): ICD-10-CM

## 2023-06-21 DIAGNOSIS — R57.8 HEMORRHAGIC SHOCK (HCC): ICD-10-CM

## 2023-06-21 PROBLEM — Z93.3 COLOSTOMY IN PLACE (HCC): Status: RESOLVED | Noted: 2020-12-18 | Resolved: 2023-06-21

## 2023-06-21 PROBLEM — K92.0 COFFEE GROUND EMESIS: Status: ACTIVE | Noted: 2023-06-21

## 2023-06-21 PROBLEM — N18.4 CKD (CHRONIC KIDNEY DISEASE) STAGE 4, GFR 15-29 ML/MIN (HCC): Status: ACTIVE | Noted: 2017-10-04

## 2023-06-21 LAB
ALBUMIN SERPL-MCNC: 3.2 G/DL (ref 3.5–5.2)
ALP SERPL-CCNC: 63 U/L (ref 40–129)
ALT SERPL-CCNC: 16 U/L (ref 5–41)
ANION GAP SERPL CALCULATED.3IONS-SCNC: 14 MMOL/L (ref 9–17)
AST SERPL-CCNC: 11 U/L
BACTERIA URNS QL MICRO: ABNORMAL
BASOPHILS # BLD: 0.06 K/UL (ref 0–0.2)
BASOPHILS NFR BLD: 0 % (ref 0–2)
BILIRUB SERPL-MCNC: 0.2 MG/DL (ref 0.3–1.2)
BILIRUB UR QL STRIP: NEGATIVE
BUN SERPL-MCNC: 82 MG/DL (ref 8–23)
BUN/CREAT SERPL: 30 (ref 9–20)
CALCIUM SERPL-MCNC: 8.4 MG/DL (ref 8.6–10.4)
CHLORIDE SERPL-SCNC: 105 MMOL/L (ref 98–107)
CLARITY UR: CLEAR
CO2 SERPL-SCNC: 17 MMOL/L (ref 20–31)
COLOR UR: YELLOW
CREAT SERPL-MCNC: 2.72 MG/DL (ref 0.7–1.2)
EOSINOPHIL # BLD: 0.07 K/UL (ref 0–0.44)
EOSINOPHILS RELATIVE PERCENT: 1 % (ref 1–4)
EPI CELLS #/AREA URNS HPF: ABNORMAL /HPF (ref 0–5)
ERYTHROCYTE [DISTWIDTH] IN BLOOD BY AUTOMATED COUNT: 13.3 % (ref 11.8–14.4)
GFR SERPL CREATININE-BSD FRML MDRD: 25 ML/MIN/1.73M2
GLUCOSE BLD-MCNC: 133 MG/DL (ref 75–110)
GLUCOSE SERPL-MCNC: 298 MG/DL (ref 70–99)
GLUCOSE UR STRIP-MCNC: ABNORMAL MG/DL
HCT VFR BLD AUTO: 27.2 % (ref 40.7–50.3)
HCT VFR BLD AUTO: 29 % (ref 40.7–50.3)
HCT VFR BLD AUTO: 29.2 % (ref 40.7–50.3)
HCT VFR BLD AUTO: 32.2 % (ref 40.7–50.3)
HGB BLD-MCNC: 8.4 G/DL (ref 13–17)
HGB BLD-MCNC: 9.3 G/DL (ref 13–17)
HGB BLD-MCNC: 9.4 G/DL (ref 13–17)
HGB BLD-MCNC: 9.8 G/DL (ref 13–17)
HGB UR QL STRIP.AUTO: NEGATIVE
IMM GRANULOCYTES # BLD AUTO: 0.11 K/UL (ref 0–0.3)
IMM GRANULOCYTES NFR BLD: 1 %
INR PPP: 1.5
KETONES UR STRIP-MCNC: ABNORMAL MG/DL
LACTATE BLDV-SCNC: 1.9 MMOL/L (ref 0.5–2.2)
LACTATE BLDV-SCNC: 3.2 MMOL/L (ref 0.5–2.2)
LEUKOCYTE ESTERASE UR QL STRIP: NEGATIVE
LIPASE SERPL-CCNC: 100 U/L (ref 13–60)
LYMPHOCYTES # BLD: 13 % (ref 24–43)
LYMPHOCYTES NFR BLD: 1.81 K/UL (ref 1.1–3.7)
MCH RBC QN AUTO: 29.2 PG (ref 25.2–33.5)
MCHC RBC AUTO-ENTMCNC: 32.2 G/DL (ref 28.4–34.8)
MCV RBC AUTO: 90.7 FL (ref 82.6–102.9)
MONOCYTES NFR BLD: 0.42 K/UL (ref 0.1–1.2)
MONOCYTES NFR BLD: 3 % (ref 3–12)
NEUTROPHILS NFR BLD: 82 % (ref 36–65)
NEUTS SEG NFR BLD: 11.6 K/UL (ref 1.5–8.1)
NITRITE UR QL STRIP: NEGATIVE
NRBC AUTOMATED: 0 PER 100 WBC
PARTIAL THROMBOPLASTIN TIME: 30.2 SEC (ref 23.9–33.8)
PH UR STRIP: 6 [PH] (ref 5–8)
PLATELET # BLD AUTO: 290 K/UL (ref 138–453)
PMV BLD AUTO: 10.6 FL (ref 8.1–13.5)
POTASSIUM SERPL-SCNC: 5.1 MMOL/L (ref 3.7–5.3)
PROT SERPL-MCNC: 5.9 G/DL (ref 6.4–8.3)
PROT UR STRIP-MCNC: ABNORMAL MG/DL
PROTHROMBIN TIME: 17.6 SEC (ref 11.5–14.2)
RBC # BLD AUTO: 3.22 M/UL (ref 4.21–5.77)
RBC #/AREA URNS HPF: ABNORMAL /HPF (ref 0–2)
SARS-COV-2 RDRP RESP QL NAA+PROBE: NOT DETECTED
SODIUM SERPL-SCNC: 136 MMOL/L (ref 135–144)
SP GR UR STRIP: 1.02 (ref 1–1.03)
SPECIMEN DESCRIPTION: NORMAL
TROPONIN I SERPL HS-MCNC: 27 NG/L (ref 0–22)
UROBILINOGEN UR STRIP-ACNC: NORMAL
WBC #/AREA URNS HPF: ABNORMAL /HPF (ref 0–5)
WBC OTHER # BLD: 14.1 K/UL (ref 3.5–11.3)

## 2023-06-21 PROCEDURE — 85730 THROMBOPLASTIN TIME PARTIAL: CPT

## 2023-06-21 PROCEDURE — 3700000001 HC ADD 15 MINUTES (ANESTHESIA): Performed by: INTERNAL MEDICINE

## 2023-06-21 PROCEDURE — 86900 BLOOD TYPING SEROLOGIC ABO: CPT

## 2023-06-21 PROCEDURE — 30233N1 TRANSFUSION OF NONAUTOLOGOUS RED BLOOD CELLS INTO PERIPHERAL VEIN, PERCUTANEOUS APPROACH: ICD-10-PCS | Performed by: INTERNAL MEDICINE

## 2023-06-21 PROCEDURE — 99223 1ST HOSP IP/OBS HIGH 75: CPT | Performed by: INTERNAL MEDICINE

## 2023-06-21 PROCEDURE — 82947 ASSAY GLUCOSE BLOOD QUANT: CPT

## 2023-06-21 PROCEDURE — 2500000003 HC RX 250 WO HCPCS: Performed by: INTERNAL MEDICINE

## 2023-06-21 PROCEDURE — 86901 BLOOD TYPING SEROLOGIC RH(D): CPT

## 2023-06-21 PROCEDURE — 85027 COMPLETE CBC AUTOMATED: CPT

## 2023-06-21 PROCEDURE — 2580000003 HC RX 258: Performed by: EMERGENCY MEDICINE

## 2023-06-21 PROCEDURE — 6360000002 HC RX W HCPCS: Performed by: NURSE PRACTITIONER

## 2023-06-21 PROCEDURE — 2000000000 HC ICU R&B

## 2023-06-21 PROCEDURE — C9113 INJ PANTOPRAZOLE SODIUM, VIA: HCPCS | Performed by: INTERNAL MEDICINE

## 2023-06-21 PROCEDURE — 3609017100 HC EGD: Performed by: INTERNAL MEDICINE

## 2023-06-21 PROCEDURE — 81001 URINALYSIS AUTO W/SCOPE: CPT

## 2023-06-21 PROCEDURE — 83605 ASSAY OF LACTIC ACID: CPT

## 2023-06-21 PROCEDURE — P9016 RBC LEUKOCYTES REDUCED: HCPCS

## 2023-06-21 PROCEDURE — 7100000000 HC PACU RECOVERY - FIRST 15 MIN: Performed by: INTERNAL MEDICINE

## 2023-06-21 PROCEDURE — 87635 SARS-COV-2 COVID-19 AMP PRB: CPT

## 2023-06-21 PROCEDURE — 93005 ELECTROCARDIOGRAM TRACING: CPT | Performed by: EMERGENCY MEDICINE

## 2023-06-21 PROCEDURE — 2709999900 HC NON-CHARGEABLE SUPPLY: Performed by: INTERNAL MEDICINE

## 2023-06-21 PROCEDURE — 36556 INSERT NON-TUNNEL CV CATH: CPT

## 2023-06-21 PROCEDURE — 3700000000 HC ANESTHESIA ATTENDED CARE: Performed by: INTERNAL MEDICINE

## 2023-06-21 PROCEDURE — 80053 COMPREHEN METABOLIC PANEL: CPT

## 2023-06-21 PROCEDURE — 2580000003 HC RX 258: Performed by: NURSE ANESTHETIST, CERTIFIED REGISTERED

## 2023-06-21 PROCEDURE — 85018 HEMOGLOBIN: CPT

## 2023-06-21 PROCEDURE — 87040 BLOOD CULTURE FOR BACTERIA: CPT

## 2023-06-21 PROCEDURE — 99285 EMERGENCY DEPT VISIT HI MDM: CPT

## 2023-06-21 PROCEDURE — 36415 COLL VENOUS BLD VENIPUNCTURE: CPT

## 2023-06-21 PROCEDURE — 74176 CT ABD & PELVIS W/O CONTRAST: CPT

## 2023-06-21 PROCEDURE — C9113 INJ PANTOPRAZOLE SODIUM, VIA: HCPCS | Performed by: EMERGENCY MEDICINE

## 2023-06-21 PROCEDURE — 6360000002 HC RX W HCPCS: Performed by: EMERGENCY MEDICINE

## 2023-06-21 PROCEDURE — 6360000002 HC RX W HCPCS: Performed by: INTERNAL MEDICINE

## 2023-06-21 PROCEDURE — 86850 RBC ANTIBODY SCREEN: CPT

## 2023-06-21 PROCEDURE — 85014 HEMATOCRIT: CPT

## 2023-06-21 PROCEDURE — 6360000002 HC RX W HCPCS: Performed by: NURSE ANESTHETIST, CERTIFIED REGISTERED

## 2023-06-21 PROCEDURE — 7100000001 HC PACU RECOVERY - ADDTL 15 MIN: Performed by: INTERNAL MEDICINE

## 2023-06-21 PROCEDURE — 02HV33Z INSERTION OF INFUSION DEVICE INTO SUPERIOR VENA CAVA, PERCUTANEOUS APPROACH: ICD-10-PCS | Performed by: EMERGENCY MEDICINE

## 2023-06-21 PROCEDURE — 86920 COMPATIBILITY TEST SPIN: CPT

## 2023-06-21 PROCEDURE — 84484 ASSAY OF TROPONIN QUANT: CPT

## 2023-06-21 PROCEDURE — 71045 X-RAY EXAM CHEST 1 VIEW: CPT

## 2023-06-21 PROCEDURE — 2580000003 HC RX 258: Performed by: NURSE PRACTITIONER

## 2023-06-21 PROCEDURE — 85610 PROTHROMBIN TIME: CPT

## 2023-06-21 PROCEDURE — 83690 ASSAY OF LIPASE: CPT

## 2023-06-21 PROCEDURE — 0DJ08ZZ INSPECTION OF UPPER INTESTINAL TRACT, VIA NATURAL OR ARTIFICIAL OPENING ENDOSCOPIC: ICD-10-PCS | Performed by: INTERNAL MEDICINE

## 2023-06-21 PROCEDURE — 36430 TRANSFUSION BLD/BLD COMPNT: CPT

## 2023-06-21 PROCEDURE — P9041 ALBUMIN (HUMAN),5%, 50ML: HCPCS | Performed by: NURSE ANESTHETIST, CERTIFIED REGISTERED

## 2023-06-21 PROCEDURE — 96365 THER/PROPH/DIAG IV INF INIT: CPT

## 2023-06-21 PROCEDURE — 96375 TX/PRO/DX INJ NEW DRUG ADDON: CPT

## 2023-06-21 PROCEDURE — 2580000003 HC RX 258: Performed by: INTERNAL MEDICINE

## 2023-06-21 RX ORDER — ONDANSETRON 4 MG/1
4 TABLET, ORALLY DISINTEGRATING ORAL EVERY 8 HOURS PRN
Status: DISCONTINUED | OUTPATIENT
Start: 2023-06-21 | End: 2023-06-26 | Stop reason: HOSPADM

## 2023-06-21 RX ORDER — METRONIDAZOLE 500 MG/100ML
500 INJECTION, SOLUTION INTRAVENOUS ONCE
Status: COMPLETED | OUTPATIENT
Start: 2023-06-21 | End: 2023-06-21

## 2023-06-21 RX ORDER — SODIUM CHLORIDE 9 MG/ML
INJECTION, SOLUTION INTRAVENOUS PRN
Status: DISCONTINUED | OUTPATIENT
Start: 2023-06-21 | End: 2023-06-23 | Stop reason: SDUPTHER

## 2023-06-21 RX ORDER — ONDANSETRON 2 MG/ML
4 INJECTION INTRAMUSCULAR; INTRAVENOUS EVERY 6 HOURS PRN
Status: DISCONTINUED | OUTPATIENT
Start: 2023-06-21 | End: 2023-06-26 | Stop reason: HOSPADM

## 2023-06-21 RX ORDER — MORPHINE SULFATE 2 MG/ML
2 INJECTION, SOLUTION INTRAMUSCULAR; INTRAVENOUS ONCE
Status: COMPLETED | OUTPATIENT
Start: 2023-06-21 | End: 2023-06-21

## 2023-06-21 RX ORDER — METOCLOPRAMIDE HYDROCHLORIDE 5 MG/ML
10 INJECTION INTRAMUSCULAR; INTRAVENOUS ONCE
Status: COMPLETED | OUTPATIENT
Start: 2023-06-21 | End: 2023-06-21

## 2023-06-21 RX ORDER — DILTIAZEM HYDROCHLORIDE 5 MG/ML
10 INJECTION INTRAVENOUS EVERY 6 HOURS PRN
Status: DISCONTINUED | OUTPATIENT
Start: 2023-06-21 | End: 2023-06-26 | Stop reason: HOSPADM

## 2023-06-21 RX ORDER — FENTANYL CITRATE 0.05 MG/ML
50 INJECTION, SOLUTION INTRAMUSCULAR; INTRAVENOUS ONCE
Status: COMPLETED | OUTPATIENT
Start: 2023-06-21 | End: 2023-06-21

## 2023-06-21 RX ORDER — PROPOFOL 10 MG/ML
INJECTION, EMULSION INTRAVENOUS CONTINUOUS PRN
Status: DISCONTINUED | OUTPATIENT
Start: 2023-06-21 | End: 2023-06-21 | Stop reason: SDUPTHER

## 2023-06-21 RX ORDER — SODIUM CHLORIDE 9 MG/ML
INJECTION, SOLUTION INTRAVENOUS CONTINUOUS
Status: DISCONTINUED | OUTPATIENT
Start: 2023-06-21 | End: 2023-06-22

## 2023-06-21 RX ORDER — METOPROLOL TARTRATE 5 MG/5ML
5 INJECTION INTRAVENOUS EVERY 4 HOURS
Status: DISCONTINUED | OUTPATIENT
Start: 2023-06-21 | End: 2023-06-24

## 2023-06-21 RX ORDER — SODIUM CHLORIDE 9 MG/ML
INJECTION, SOLUTION INTRAVENOUS PRN
Status: DISCONTINUED | OUTPATIENT
Start: 2023-06-21 | End: 2023-06-23

## 2023-06-21 RX ORDER — INSULIN LISPRO 100 [IU]/ML
0-4 INJECTION, SOLUTION INTRAVENOUS; SUBCUTANEOUS EVERY 6 HOURS
Status: DISCONTINUED | OUTPATIENT
Start: 2023-06-21 | End: 2023-06-26 | Stop reason: HOSPADM

## 2023-06-21 RX ORDER — DIPHENHYDRAMINE HYDROCHLORIDE 50 MG/ML
25 INJECTION INTRAMUSCULAR; INTRAVENOUS ONCE
Status: COMPLETED | OUTPATIENT
Start: 2023-06-21 | End: 2023-06-21

## 2023-06-21 RX ORDER — ACETAMINOPHEN 325 MG/1
650 TABLET ORAL EVERY 6 HOURS PRN
Status: DISCONTINUED | OUTPATIENT
Start: 2023-06-21 | End: 2023-06-26 | Stop reason: HOSPADM

## 2023-06-21 RX ORDER — 0.9 % SODIUM CHLORIDE 0.9 %
1000 INTRAVENOUS SOLUTION INTRAVENOUS ONCE
Status: COMPLETED | OUTPATIENT
Start: 2023-06-21 | End: 2023-06-21

## 2023-06-21 RX ORDER — DEXTROSE MONOHYDRATE 100 MG/ML
INJECTION, SOLUTION INTRAVENOUS CONTINUOUS PRN
Status: DISCONTINUED | OUTPATIENT
Start: 2023-06-21 | End: 2023-06-26 | Stop reason: HOSPADM

## 2023-06-21 RX ORDER — ALBUMIN, HUMAN INJ 5% 5 %
SOLUTION INTRAVENOUS PRN
Status: DISCONTINUED | OUTPATIENT
Start: 2023-06-21 | End: 2023-06-21 | Stop reason: SDUPTHER

## 2023-06-21 RX ORDER — SODIUM CHLORIDE 0.9 % (FLUSH) 0.9 %
5-40 SYRINGE (ML) INJECTION EVERY 12 HOURS SCHEDULED
Status: DISCONTINUED | OUTPATIENT
Start: 2023-06-21 | End: 2023-06-25 | Stop reason: SDUPTHER

## 2023-06-21 RX ORDER — SODIUM CHLORIDE 9 MG/ML
50 INJECTION, SOLUTION INTRAVENOUS ONCE
Status: COMPLETED | OUTPATIENT
Start: 2023-06-21 | End: 2023-06-21

## 2023-06-21 RX ORDER — ACETAMINOPHEN 650 MG/1
650 SUPPOSITORY RECTAL EVERY 6 HOURS PRN
Status: DISCONTINUED | OUTPATIENT
Start: 2023-06-21 | End: 2023-06-26 | Stop reason: HOSPADM

## 2023-06-21 RX ORDER — ONDANSETRON 2 MG/ML
4 INJECTION INTRAMUSCULAR; INTRAVENOUS ONCE
Status: COMPLETED | OUTPATIENT
Start: 2023-06-21 | End: 2023-06-21

## 2023-06-21 RX ORDER — SODIUM CHLORIDE 9 MG/ML
INJECTION, SOLUTION INTRAVENOUS CONTINUOUS PRN
Status: DISCONTINUED | OUTPATIENT
Start: 2023-06-21 | End: 2023-06-21 | Stop reason: SDUPTHER

## 2023-06-21 RX ORDER — SODIUM CHLORIDE 0.9 % (FLUSH) 0.9 %
5-40 SYRINGE (ML) INJECTION PRN
Status: DISCONTINUED | OUTPATIENT
Start: 2023-06-21 | End: 2023-06-26 | Stop reason: HOSPADM

## 2023-06-21 RX ADMIN — DILTIAZEM HYDROCHLORIDE 10 MG: 5 INJECTION INTRAVENOUS at 18:36

## 2023-06-21 RX ADMIN — ONDANSETRON 4 MG: 2 INJECTION INTRAMUSCULAR; INTRAVENOUS at 11:37

## 2023-06-21 RX ADMIN — SODIUM CHLORIDE 1000 ML: 9 INJECTION, SOLUTION INTRAVENOUS at 11:37

## 2023-06-21 RX ADMIN — SODIUM CHLORIDE: 9 INJECTION, SOLUTION INTRAVENOUS at 13:53

## 2023-06-21 RX ADMIN — DIPHENHYDRAMINE HYDROCHLORIDE 25 MG: 50 INJECTION, SOLUTION INTRAMUSCULAR; INTRAVENOUS at 22:02

## 2023-06-21 RX ADMIN — Medication 5000 UNITS: at 12:45

## 2023-06-21 RX ADMIN — SODIUM CHLORIDE, PRESERVATIVE FREE 80 MG: 5 INJECTION INTRAVENOUS at 11:36

## 2023-06-21 RX ADMIN — PANTOPRAZOLE SODIUM 8 MG/HR: 40 INJECTION, POWDER, LYOPHILIZED, FOR SOLUTION INTRAVENOUS at 19:18

## 2023-06-21 RX ADMIN — METOPROLOL TARTRATE 5 MG: 5 INJECTION INTRAVENOUS at 21:44

## 2023-06-21 RX ADMIN — MORPHINE SULFATE 2 MG: 2 INJECTION, SOLUTION INTRAMUSCULAR; INTRAVENOUS at 22:01

## 2023-06-21 RX ADMIN — METRONIDAZOLE 500 MG: 500 INJECTION, SOLUTION INTRAVENOUS at 16:30

## 2023-06-21 RX ADMIN — SODIUM CHLORIDE: 9 INJECTION, SOLUTION INTRAVENOUS at 16:23

## 2023-06-21 RX ADMIN — METOCLOPRAMIDE HYDROCHLORIDE 10 MG: 5 INJECTION INTRAMUSCULAR; INTRAVENOUS at 16:30

## 2023-06-21 RX ADMIN — SODIUM CHLORIDE, PRESERVATIVE FREE 10 ML: 5 INJECTION INTRAVENOUS at 21:45

## 2023-06-21 RX ADMIN — SODIUM CHLORIDE 50 ML: 9 INJECTION, SOLUTION INTRAVENOUS at 13:18

## 2023-06-21 RX ADMIN — PROPOFOL 60 MCG/KG/MIN: 10 INJECTION, EMULSION INTRAVENOUS at 13:59

## 2023-06-21 RX ADMIN — METOPROLOL TARTRATE 5 MG: 5 INJECTION INTRAVENOUS at 17:13

## 2023-06-21 RX ADMIN — ALBUMIN (HUMAN) 500 ML: 12.5 INJECTION, SOLUTION INTRAVENOUS at 13:54

## 2023-06-21 RX ADMIN — FENTANYL CITRATE 50 MCG: 0.05 INJECTION, SOLUTION INTRAMUSCULAR; INTRAVENOUS at 11:37

## 2023-06-21 ASSESSMENT — ENCOUNTER SYMPTOMS
ABDOMINAL PAIN: 1
SHORTNESS OF BREATH: 0
VOMITING: 1
SHORTNESS OF BREATH: 0
BLOOD IN STOOL: 0
ANAL BLEEDING: 0
NAUSEA: 1
DIARRHEA: 1

## 2023-06-21 ASSESSMENT — PAIN DESCRIPTION - ORIENTATION: ORIENTATION: MID

## 2023-06-21 ASSESSMENT — PAIN SCALES - GENERAL
PAINLEVEL_OUTOF10: 5
PAINLEVEL_OUTOF10: 9
PAINLEVEL_OUTOF10: 1

## 2023-06-21 ASSESSMENT — PAIN DESCRIPTION - LOCATION: LOCATION: ABDOMEN

## 2023-06-21 ASSESSMENT — PAIN DESCRIPTION - DESCRIPTORS: DESCRIPTORS: ACHING;DULL

## 2023-06-21 NOTE — ANESTHESIA PRE PROCEDURE
(-) shortness of breath                           Cardiovascular:    (+) hypertension:, dysrhythmias: atrial fibrillation,         Rhythm: regular                      Neuro/Psych:               GI/Hepatic/Renal:             Endo/Other:    (+) Diabetes, . Abdominal:             Vascular:           Other Findings:           Anesthesia Plan      general     ASA 3 - emergent                                   Baldev Nation MD   6/21/2023

## 2023-06-21 NOTE — ANESTHESIA POSTPROCEDURE EVALUATION
Department of Anesthesiology  Postprocedure Note    Patient: Bo Hearn  MRN: [id number]  YOB: 1960  Date of evaluation: 6/21/2023      Procedure Summary     Date: 06/21/23 Room / Location: [place] [address] / [facility] - INPATIENT    Anesthesia Start: 8748 Anesthesia Stop: 6388    Procedure: EGD ESOPHAGOGASTRODUODENOSCOPY Diagnosis:       Gastrointestinal hemorrhage with hematemesis      (GI BLEED)    Surgeons: Charisma Hendrix MD Responsible Provider: Pascual Godinez MD    Anesthesia Type: general ASA Status: 3 - Emergent          Anesthesia Type: No value filed.     Destiny Phase I: Destiny Score: 8    Destiny Phase II:        Anesthesia Post Evaluation    Complications: no yes...

## 2023-06-22 ENCOUNTER — ANESTHESIA (OUTPATIENT)
Dept: OPERATING ROOM | Age: 63
End: 2023-06-22
Payer: COMMERCIAL

## 2023-06-22 ENCOUNTER — APPOINTMENT (OUTPATIENT)
Dept: GENERAL RADIOLOGY | Age: 63
DRG: 377 | End: 2023-06-22
Payer: COMMERCIAL

## 2023-06-22 ENCOUNTER — ANESTHESIA EVENT (OUTPATIENT)
Dept: OPERATING ROOM | Age: 63
End: 2023-06-22
Payer: COMMERCIAL

## 2023-06-22 LAB
ALLEN TEST: POSITIVE
ANION GAP SERPL CALCULATED.3IONS-SCNC: 12 MMOL/L (ref 9–17)
ANION GAP SERPL CALCULATED.3IONS-SCNC: 13 MMOL/L (ref 9–17)
BASOPHILS # BLD: 0 K/UL (ref 0–0.2)
BASOPHILS NFR BLD: 0 % (ref 0–2)
BUN SERPL-MCNC: 100 MG/DL (ref 8–23)
BUN SERPL-MCNC: 99 MG/DL (ref 8–23)
BUN/CREAT SERPL: 36 (ref 9–20)
BUN/CREAT SERPL: 38 (ref 9–20)
CALCIUM SERPL-MCNC: 7.6 MG/DL (ref 8.6–10.4)
CALCIUM SERPL-MCNC: 7.8 MG/DL (ref 8.6–10.4)
CHLORIDE SERPL-SCNC: 112 MMOL/L (ref 98–107)
CHLORIDE SERPL-SCNC: 113 MMOL/L (ref 98–107)
CO2 SERPL-SCNC: 15 MMOL/L (ref 20–31)
CO2 SERPL-SCNC: 17 MMOL/L (ref 20–31)
CREAT SERPL-MCNC: 2.62 MG/DL (ref 0.7–1.2)
CREAT SERPL-MCNC: 2.72 MG/DL (ref 0.7–1.2)
EKG ATRIAL RATE: 121 BPM
EKG P AXIS: 38 DEGREES
EKG P-R INTERVAL: 168 MS
EKG Q-T INTERVAL: 318 MS
EKG QRS DURATION: 84 MS
EKG QTC CALCULATION (BAZETT): 451 MS
EKG R AXIS: -2 DEGREES
EKG T AXIS: 33 DEGREES
EKG VENTRICULAR RATE: 121 BPM
EOSINOPHIL # BLD: 0.21 K/UL (ref 0–0.44)
EOSINOPHILS RELATIVE PERCENT: 1 % (ref 1–4)
ERYTHROCYTE [DISTWIDTH] IN BLOOD BY AUTOMATED COUNT: 14.6 % (ref 11.8–14.4)
FIO2: 3
GFR SERPL CREATININE-BSD FRML MDRD: 25 ML/MIN/1.73M2
GFR SERPL CREATININE-BSD FRML MDRD: 26 ML/MIN/1.73M2
GLUCOSE BLD-MCNC: 113 MG/DL (ref 75–110)
GLUCOSE BLD-MCNC: 183 MG/DL (ref 75–110)
GLUCOSE BLD-MCNC: 186 MG/DL (ref 75–110)
GLUCOSE BLD-MCNC: 236 MG/DL (ref 75–110)
GLUCOSE SERPL-MCNC: 189 MG/DL (ref 70–99)
GLUCOSE SERPL-MCNC: 205 MG/DL (ref 70–99)
HCT VFR BLD AUTO: 23.7 % (ref 40.7–50.3)
HCT VFR BLD AUTO: 24.3 % (ref 40.7–50.3)
HCT VFR BLD AUTO: 26.1 % (ref 40.7–50.3)
HCT VFR BLD AUTO: 27.3 % (ref 40.7–50.3)
HCT VFR BLD AUTO: 28.9 % (ref 40.7–50.3)
HCT VFR BLD AUTO: 28.9 % (ref 40.7–50.3)
HGB BLD-MCNC: 7.6 G/DL (ref 13–17)
HGB BLD-MCNC: 7.8 G/DL (ref 13–17)
HGB BLD-MCNC: 8.3 G/DL (ref 13–17)
HGB BLD-MCNC: 8.4 G/DL (ref 13–17)
HGB BLD-MCNC: 9.2 G/DL (ref 13–17)
HGB BLD-MCNC: 9.3 G/DL (ref 13–17)
IMM GRANULOCYTES # BLD AUTO: 0.21 K/UL (ref 0–0.3)
IMM GRANULOCYTES NFR BLD: 1 %
INR PPP: 1.4
IRON SATN MFR SERPL: ABNORMAL % (ref 20–55)
IRON SERPL-MCNC: 205 UG/DL (ref 59–158)
LACTATE BLDV-SCNC: 1.7 MMOL/L (ref 0.5–1.9)
LACTATE BLDV-SCNC: 2.7 MMOL/L (ref 0.5–1.9)
LYMPHOCYTES # BLD: 15 % (ref 24–43)
LYMPHOCYTES NFR BLD: 3.12 K/UL (ref 1.1–3.7)
MCH RBC QN AUTO: 29.3 PG (ref 25.2–33.5)
MCHC RBC AUTO-ENTMCNC: 31.3 G/DL (ref 28.4–34.8)
MCV RBC AUTO: 93.8 FL (ref 82.6–102.9)
MONOCYTES NFR BLD: 10 % (ref 3–12)
MONOCYTES NFR BLD: 2.08 K/UL (ref 0.1–1.2)
NEGATIVE BASE EXCESS, ART: 4 (ref 0–2)
NEUTROPHILS NFR BLD: 73 % (ref 36–65)
NEUTS SEG NFR BLD: 15.18 K/UL (ref 1.5–8.1)
NRBC AUTOMATED: 0 PER 100 WBC
O2 DEVICE/FLOW/%: ABNORMAL
PARTIAL THROMBOPLASTIN TIME: 21.6 SEC (ref 23.9–33.8)
PLATELET # BLD AUTO: 219 K/UL (ref 138–453)
PMV BLD AUTO: 10.8 FL (ref 8.1–13.5)
POC HCO3: 18.9 MMOL/L (ref 21–28)
POC O2 SATURATION: 99 % (ref 94–98)
POC PCO2: 27.2 MM HG (ref 35–48)
POC PH: 7.45 (ref 7.35–7.45)
POC PO2: 125.7 MM HG (ref 83–108)
POTASSIUM SERPL-SCNC: 4.6 MMOL/L (ref 3.7–5.3)
POTASSIUM SERPL-SCNC: 5.3 MMOL/L (ref 3.7–5.3)
PROTHROMBIN TIME: 16.6 SEC (ref 11.5–14.2)
RBC # BLD AUTO: 2.59 M/UL (ref 4.21–5.77)
SAMPLE SITE: ABNORMAL
SODIUM SERPL-SCNC: 139 MMOL/L (ref 135–144)
SODIUM SERPL-SCNC: 143 MMOL/L (ref 135–144)
TIBC SERPL-MCNC: ABNORMAL UG/DL (ref 250–450)
TROPONIN I SERPL HS-MCNC: 50 NG/L (ref 0–22)
UNSATURATED IRON BINDING CAPACITY: <17 UG/DL (ref 112–347)
WBC OTHER # BLD: 20.8 K/UL (ref 3.5–11.3)

## 2023-06-22 PROCEDURE — 3609013000 HC EGD TRANSORAL CONTROL BLEEDING ANY METHOD: Performed by: INTERNAL MEDICINE

## 2023-06-22 PROCEDURE — 6360000002 HC RX W HCPCS: Performed by: NURSE PRACTITIONER

## 2023-06-22 PROCEDURE — 71045 X-RAY EXAM CHEST 1 VIEW: CPT

## 2023-06-22 PROCEDURE — 2500000003 HC RX 250 WO HCPCS: Performed by: INTERNAL MEDICINE

## 2023-06-22 PROCEDURE — 94761 N-INVAS EAR/PLS OXIMETRY MLT: CPT

## 2023-06-22 PROCEDURE — 2500000003 HC RX 250 WO HCPCS: Performed by: NURSE PRACTITIONER

## 2023-06-22 PROCEDURE — 83605 ASSAY OF LACTIC ACID: CPT

## 2023-06-22 PROCEDURE — C9113 INJ PANTOPRAZOLE SODIUM, VIA: HCPCS | Performed by: INTERNAL MEDICINE

## 2023-06-22 PROCEDURE — 36600 WITHDRAWAL OF ARTERIAL BLOOD: CPT

## 2023-06-22 PROCEDURE — 6360000002 HC RX W HCPCS: Performed by: INTERNAL MEDICINE

## 2023-06-22 PROCEDURE — 7100000001 HC PACU RECOVERY - ADDTL 15 MIN: Performed by: INTERNAL MEDICINE

## 2023-06-22 PROCEDURE — 85730 THROMBOPLASTIN TIME PARTIAL: CPT

## 2023-06-22 PROCEDURE — 83550 IRON BINDING TEST: CPT

## 2023-06-22 PROCEDURE — 93010 ELECTROCARDIOGRAM REPORT: CPT | Performed by: INTERNAL MEDICINE

## 2023-06-22 PROCEDURE — 2720000010 HC SURG SUPPLY STERILE: Performed by: INTERNAL MEDICINE

## 2023-06-22 PROCEDURE — P9016 RBC LEUKOCYTES REDUCED: HCPCS

## 2023-06-22 PROCEDURE — 82947 ASSAY GLUCOSE BLOOD QUANT: CPT

## 2023-06-22 PROCEDURE — 2700000000 HC OXYGEN THERAPY PER DAY

## 2023-06-22 PROCEDURE — 2580000003 HC RX 258: Performed by: NURSE PRACTITIONER

## 2023-06-22 PROCEDURE — 85018 HEMOGLOBIN: CPT

## 2023-06-22 PROCEDURE — 84484 ASSAY OF TROPONIN QUANT: CPT

## 2023-06-22 PROCEDURE — 85610 PROTHROMBIN TIME: CPT

## 2023-06-22 PROCEDURE — 85014 HEMATOCRIT: CPT

## 2023-06-22 PROCEDURE — 2580000003 HC RX 258: Performed by: INTERNAL MEDICINE

## 2023-06-22 PROCEDURE — 2500000003 HC RX 250 WO HCPCS: Performed by: ANESTHESIOLOGY

## 2023-06-22 PROCEDURE — 2580000003 HC RX 258: Performed by: ANESTHESIOLOGY

## 2023-06-22 PROCEDURE — 3700000001 HC ADD 15 MINUTES (ANESTHESIA): Performed by: INTERNAL MEDICINE

## 2023-06-22 PROCEDURE — 3700000000 HC ANESTHESIA ATTENDED CARE: Performed by: INTERNAL MEDICINE

## 2023-06-22 PROCEDURE — 0W3P8ZZ CONTROL BLEEDING IN GASTROINTESTINAL TRACT, VIA NATURAL OR ARTIFICIAL OPENING ENDOSCOPIC: ICD-10-PCS | Performed by: INTERNAL MEDICINE

## 2023-06-22 PROCEDURE — 7100000000 HC PACU RECOVERY - FIRST 15 MIN: Performed by: INTERNAL MEDICINE

## 2023-06-22 PROCEDURE — 85027 COMPLETE CBC AUTOMATED: CPT

## 2023-06-22 PROCEDURE — 99233 SBSQ HOSP IP/OBS HIGH 50: CPT | Performed by: INTERNAL MEDICINE

## 2023-06-22 PROCEDURE — 36430 TRANSFUSION BLD/BLD COMPNT: CPT

## 2023-06-22 PROCEDURE — C1889 IMPLANT/INSERT DEVICE, NOC: HCPCS | Performed by: INTERNAL MEDICINE

## 2023-06-22 PROCEDURE — 83540 ASSAY OF IRON: CPT

## 2023-06-22 PROCEDURE — 74018 RADEX ABDOMEN 1 VIEW: CPT

## 2023-06-22 PROCEDURE — 86900 BLOOD TYPING SEROLOGIC ABO: CPT

## 2023-06-22 PROCEDURE — 2500000003 HC RX 250 WO HCPCS: Performed by: NURSE ANESTHETIST, CERTIFIED REGISTERED

## 2023-06-22 PROCEDURE — 2000000000 HC ICU R&B

## 2023-06-22 PROCEDURE — 6370000000 HC RX 637 (ALT 250 FOR IP): Performed by: INTERNAL MEDICINE

## 2023-06-22 PROCEDURE — 2580000003 HC RX 258: Performed by: NURSE ANESTHETIST, CERTIFIED REGISTERED

## 2023-06-22 PROCEDURE — 82803 BLOOD GASES ANY COMBINATION: CPT

## 2023-06-22 PROCEDURE — 36415 COLL VENOUS BLD VENIPUNCTURE: CPT

## 2023-06-22 PROCEDURE — 2709999900 HC NON-CHARGEABLE SUPPLY: Performed by: INTERNAL MEDICINE

## 2023-06-22 PROCEDURE — 80048 BASIC METABOLIC PNL TOTAL CA: CPT

## 2023-06-22 PROCEDURE — 6360000002 HC RX W HCPCS: Performed by: NURSE ANESTHETIST, CERTIFIED REGISTERED

## 2023-06-22 DEVICE — WORKING LENGTH 235CM, WORKING CHANNEL 2.8MM
Type: IMPLANTABLE DEVICE | Status: FUNCTIONAL
Brand: RESOLUTION 360 CLIP

## 2023-06-22 RX ORDER — VASOPRESSIN 20 [USP'U]/ML
INJECTION, SOLUTION INTRAMUSCULAR; SUBCUTANEOUS PRN
Status: DISCONTINUED | OUTPATIENT
Start: 2023-06-22 | End: 2023-06-22 | Stop reason: SDUPTHER

## 2023-06-22 RX ORDER — METOPROLOL TARTRATE 5 MG/5ML
INJECTION INTRAVENOUS PRN
Status: DISCONTINUED | OUTPATIENT
Start: 2023-06-22 | End: 2023-06-22 | Stop reason: SDUPTHER

## 2023-06-22 RX ORDER — PROPOFOL 10 MG/ML
INJECTION, EMULSION INTRAVENOUS PRN
Status: DISCONTINUED | OUTPATIENT
Start: 2023-06-22 | End: 2023-06-22 | Stop reason: SDUPTHER

## 2023-06-22 RX ORDER — LIDOCAINE HYDROCHLORIDE 20 MG/ML
INJECTION, SOLUTION INFILTRATION; PERINEURAL PRN
Status: DISCONTINUED | OUTPATIENT
Start: 2023-06-22 | End: 2023-06-22 | Stop reason: SDUPTHER

## 2023-06-22 RX ORDER — SODIUM CHLORIDE, SODIUM LACTATE, POTASSIUM CHLORIDE, CALCIUM CHLORIDE 600; 310; 30; 20 MG/100ML; MG/100ML; MG/100ML; MG/100ML
INJECTION, SOLUTION INTRAVENOUS CONTINUOUS PRN
Status: DISCONTINUED | OUTPATIENT
Start: 2023-06-22 | End: 2023-06-22 | Stop reason: SDUPTHER

## 2023-06-22 RX ORDER — FENTANYL CITRATE 50 UG/ML
INJECTION, SOLUTION INTRAMUSCULAR; INTRAVENOUS PRN
Status: DISCONTINUED | OUTPATIENT
Start: 2023-06-22 | End: 2023-06-22 | Stop reason: SDUPTHER

## 2023-06-22 RX ORDER — SIMETHICONE 80 MG
80 TABLET,CHEWABLE ORAL EVERY 6 HOURS PRN
COMMUNITY

## 2023-06-22 RX ORDER — EPHEDRINE SULFATE/0.9% NACL/PF 50 MG/5 ML
SYRINGE (ML) INTRAVENOUS PRN
Status: DISCONTINUED | OUTPATIENT
Start: 2023-06-22 | End: 2023-06-22 | Stop reason: SDUPTHER

## 2023-06-22 RX ORDER — MORPHINE SULFATE 2 MG/ML
2 INJECTION, SOLUTION INTRAMUSCULAR; INTRAVENOUS ONCE
Status: COMPLETED | OUTPATIENT
Start: 2023-06-22 | End: 2023-06-22

## 2023-06-22 RX ORDER — SODIUM CHLORIDE 9 MG/ML
INJECTION, SOLUTION INTRAVENOUS PRN
Status: DISCONTINUED | OUTPATIENT
Start: 2023-06-22 | End: 2023-06-23 | Stop reason: SDUPTHER

## 2023-06-22 RX ADMIN — METOPROLOL TARTRATE 5 MG: 5 INJECTION INTRAVENOUS at 05:52

## 2023-06-22 RX ADMIN — PROPOFOL 50 MG: 10 INJECTION, EMULSION INTRAVENOUS at 16:46

## 2023-06-22 RX ADMIN — PROPOFOL 50 MG: 10 INJECTION, EMULSION INTRAVENOUS at 16:24

## 2023-06-22 RX ADMIN — PROPOFOL 50 MG: 10 INJECTION, EMULSION INTRAVENOUS at 16:34

## 2023-06-22 RX ADMIN — VASOPRESSIN 1 UNITS: 20 INJECTION INTRAVENOUS at 15:44

## 2023-06-22 RX ADMIN — PROPOFOL 50 MG: 10 INJECTION, EMULSION INTRAVENOUS at 16:52

## 2023-06-22 RX ADMIN — PROPOFOL 50 MG: 10 INJECTION, EMULSION INTRAVENOUS at 17:04

## 2023-06-22 RX ADMIN — SODIUM CHLORIDE: 9 INJECTION, SOLUTION INTRAVENOUS at 05:25

## 2023-06-22 RX ADMIN — DILTIAZEM HYDROCHLORIDE 10 MG: 5 INJECTION INTRAVENOUS at 07:22

## 2023-06-22 RX ADMIN — MORPHINE SULFATE 2 MG: 2 INJECTION, SOLUTION INTRAMUSCULAR; INTRAVENOUS at 07:22

## 2023-06-22 RX ADMIN — VASOPRESSIN 2 UNITS: 20 INJECTION INTRAVENOUS at 15:58

## 2023-06-22 RX ADMIN — PROPOFOL 50 MG: 10 INJECTION, EMULSION INTRAVENOUS at 14:53

## 2023-06-22 RX ADMIN — LIDOCAINE HYDROCHLORIDE 50 MG: 20 INJECTION, SOLUTION INFILTRATION; PERINEURAL at 15:24

## 2023-06-22 RX ADMIN — SODIUM CHLORIDE, PRESERVATIVE FREE 10 ML: 5 INJECTION INTRAVENOUS at 08:57

## 2023-06-22 RX ADMIN — PROPOFOL 50 MG: 10 INJECTION, EMULSION INTRAVENOUS at 15:24

## 2023-06-22 RX ADMIN — VASOPRESSIN 2 UNITS: 20 INJECTION INTRAVENOUS at 16:14

## 2023-06-22 RX ADMIN — FENTANYL CITRATE 25 MCG: 50 INJECTION INTRAMUSCULAR; INTRAVENOUS at 15:24

## 2023-06-22 RX ADMIN — METOPROLOL TARTRATE 5 MG: 5 INJECTION, SOLUTION INTRAVENOUS at 15:12

## 2023-06-22 RX ADMIN — SODIUM CHLORIDE, POTASSIUM CHLORIDE, SODIUM LACTATE AND CALCIUM CHLORIDE: 600; 310; 30; 20 INJECTION, SOLUTION INTRAVENOUS at 16:13

## 2023-06-22 RX ADMIN — PROPOFOL 50 MG: 10 INJECTION, EMULSION INTRAVENOUS at 17:09

## 2023-06-22 RX ADMIN — SODIUM BICARBONATE: 84 INJECTION, SOLUTION INTRAVENOUS at 13:43

## 2023-06-22 RX ADMIN — SODIUM CHLORIDE, PRESERVATIVE FREE 10 ML: 5 INJECTION INTRAVENOUS at 21:05

## 2023-06-22 RX ADMIN — PHENYLEPHRINE HYDROCHLORIDE 200 MCG: 10 INJECTION INTRAVENOUS at 15:31

## 2023-06-22 RX ADMIN — Medication 10 MG: at 15:39

## 2023-06-22 RX ADMIN — PANTOPRAZOLE SODIUM 8 MG/HR: 40 INJECTION, POWDER, LYOPHILIZED, FOR SOLUTION INTRAVENOUS at 20:59

## 2023-06-22 RX ADMIN — FENTANYL CITRATE 50 MCG: 50 INJECTION INTRAMUSCULAR; INTRAVENOUS at 15:20

## 2023-06-22 RX ADMIN — DILTIAZEM HYDROCHLORIDE 10 MG: 5 INJECTION INTRAVENOUS at 14:43

## 2023-06-22 RX ADMIN — PROPOFOL 50 MG: 10 INJECTION, EMULSION INTRAVENOUS at 17:22

## 2023-06-22 RX ADMIN — ONDANSETRON 4 MG: 2 INJECTION INTRAMUSCULAR; INTRAVENOUS at 04:32

## 2023-06-22 RX ADMIN — METOPROLOL TARTRATE 5 MG: 5 INJECTION INTRAVENOUS at 10:13

## 2023-06-22 RX ADMIN — PROPOFOL 50 MG: 10 INJECTION, EMULSION INTRAVENOUS at 16:09

## 2023-06-22 RX ADMIN — VASOPRESSIN 2 UNITS: 20 INJECTION INTRAVENOUS at 15:46

## 2023-06-22 RX ADMIN — METOPROLOL TARTRATE 5 MG: 5 INJECTION INTRAVENOUS at 21:41

## 2023-06-22 RX ADMIN — PROPOFOL 50 MG: 10 INJECTION, EMULSION INTRAVENOUS at 16:59

## 2023-06-22 RX ADMIN — PROPOFOL 50 MG: 10 INJECTION, EMULSION INTRAVENOUS at 17:14

## 2023-06-22 RX ADMIN — METOPROLOL TARTRATE 5 MG: 5 INJECTION INTRAVENOUS at 01:53

## 2023-06-22 RX ADMIN — PROPOFOL 50 MG: 10 INJECTION, EMULSION INTRAVENOUS at 16:16

## 2023-06-22 RX ADMIN — VASOPRESSIN 2 UNITS: 20 INJECTION INTRAVENOUS at 15:49

## 2023-06-22 RX ADMIN — FENTANYL CITRATE 25 MCG: 50 INJECTION INTRAMUSCULAR; INTRAVENOUS at 15:32

## 2023-06-22 RX ADMIN — DILTIAZEM HYDROCHLORIDE 10 MG: 5 INJECTION INTRAVENOUS at 00:42

## 2023-06-22 RX ADMIN — PHENYLEPHRINE HYDROCHLORIDE 30 MCG/MIN: 10 INJECTION INTRAVENOUS at 15:48

## 2023-06-22 RX ADMIN — INSULIN LISPRO 1 UNITS: 100 INJECTION, SOLUTION INTRAVENOUS; SUBCUTANEOUS at 13:10

## 2023-06-22 RX ADMIN — Medication 10 MG: at 15:34

## 2023-06-22 RX ADMIN — VASOPRESSIN 1 UNITS: 20 INJECTION INTRAVENOUS at 15:42

## 2023-06-22 RX ADMIN — PROPOFOL 50 MG: 10 INJECTION, EMULSION INTRAVENOUS at 17:34

## 2023-06-22 RX ADMIN — PROPOFOL 50 MG: 10 INJECTION, EMULSION INTRAVENOUS at 16:40

## 2023-06-22 RX ADMIN — VASOPRESSIN 2 UNITS: 20 INJECTION INTRAVENOUS at 16:07

## 2023-06-22 RX ADMIN — PROPOFOL 50 MG: 10 INJECTION, EMULSION INTRAVENOUS at 15:43

## 2023-06-22 RX ADMIN — PROPOFOL 50 MG: 10 INJECTION, EMULSION INTRAVENOUS at 17:27

## 2023-06-22 RX ADMIN — Medication 10 MG: at 15:31

## 2023-06-22 RX ADMIN — PROPOFOL 50 MG: 10 INJECTION, EMULSION INTRAVENOUS at 15:34

## 2023-06-22 RX ADMIN — Medication 10 MG: at 15:37

## 2023-06-22 RX ADMIN — PROPOFOL 50 MG: 10 INJECTION, EMULSION INTRAVENOUS at 15:27

## 2023-06-22 RX ADMIN — PROPOFOL 50 MG: 10 INJECTION, EMULSION INTRAVENOUS at 16:00

## 2023-06-22 RX ADMIN — SODIUM BICARBONATE 150 MEQ: 84 INJECTION INTRAVENOUS at 13:01

## 2023-06-22 RX ADMIN — PROPOFOL 50 MG: 10 INJECTION, EMULSION INTRAVENOUS at 15:38

## 2023-06-22 RX ADMIN — PANTOPRAZOLE SODIUM 8 MG/HR: 40 INJECTION, POWDER, LYOPHILIZED, FOR SOLUTION INTRAVENOUS at 03:01

## 2023-06-22 RX ADMIN — PROPOFOL 50 MG: 10 INJECTION, EMULSION INTRAVENOUS at 15:48

## 2023-06-22 RX ADMIN — PHENYLEPHRINE HYDROCHLORIDE 100 MCG: 10 INJECTION INTRAVENOUS at 15:33

## 2023-06-22 RX ADMIN — PROPOFOL 50 MG: 10 INJECTION, EMULSION INTRAVENOUS at 16:28

## 2023-06-22 RX ADMIN — VASOPRESSIN 2 UNITS: 20 INJECTION INTRAVENOUS at 16:50

## 2023-06-22 RX ADMIN — PROPOFOL 50 MG: 10 INJECTION, EMULSION INTRAVENOUS at 15:52

## 2023-06-22 RX ADMIN — METOPROLOL TARTRATE 5 MG: 5 INJECTION INTRAVENOUS at 13:45

## 2023-06-22 RX ADMIN — Medication 10 MG: at 15:42

## 2023-06-22 RX ADMIN — PANTOPRAZOLE SODIUM 8 MG/HR: 40 INJECTION, POWDER, LYOPHILIZED, FOR SOLUTION INTRAVENOUS at 11:36

## 2023-06-22 ASSESSMENT — PAIN SCALES - GENERAL
PAINLEVEL_OUTOF10: 2
PAINLEVEL_OUTOF10: 5
PAINLEVEL_OUTOF10: 5

## 2023-06-22 ASSESSMENT — PAIN DESCRIPTION - ORIENTATION
ORIENTATION: MID
ORIENTATION: MID

## 2023-06-22 ASSESSMENT — PAIN DESCRIPTION - LOCATION
LOCATION: ABDOMEN

## 2023-06-22 ASSESSMENT — ENCOUNTER SYMPTOMS: SHORTNESS OF BREATH: 0

## 2023-06-22 ASSESSMENT — PAIN DESCRIPTION - DESCRIPTORS: DESCRIPTORS: ACHING

## 2023-06-22 NOTE — ANESTHESIA POSTPROCEDURE EVALUATION
Department of Anesthesiology  Postprocedure Note    Patient: Cydney Salinas  MRN: [id number]  YOB: 1960  Date of evaluation: 6/22/2023      Procedure Summary     Date: 06/22/23 Room / Location: [place] [address] / [facility] - INPATIENT    Anesthesia Start: 1519 Anesthesia Stop: 1813    Procedure: EGD CONTROL HEMORRHAGE WITH CLIP APPLICATION Diagnosis:       Hematemesis, unspecified whether nausea present      (Hematemesis, unspecified whether nausea present [K92.0])    Surgeons: Gabriel Whitehead MD Responsible Provider: Cathy Coleman MD    Anesthesia Type: MAC, general ASA Status: 3          Anesthesia Type: No value filed. Destiny Phase I: Destiny Score: 8    Destiny Phase II:        Anesthesia Post Evaluation    Patient location during evaluation: PACU  Patient participation: complete - patient participated  Level of consciousness: awake  Airway patency: patent  Nausea & Vomiting: no nausea and no vomiting  Complications: no  Cardiovascular status: hemodynamically stable  Respiratory status: acceptable  Hydration status: stable  There was medical reason for not using a multimodal analgesia pain management approach.

## 2023-06-23 ENCOUNTER — TELEPHONE (OUTPATIENT)
Dept: FAMILY MEDICINE CLINIC | Age: 63
End: 2023-06-23

## 2023-06-23 ENCOUNTER — ANESTHESIA (OUTPATIENT)
Dept: OPERATING ROOM | Age: 63
DRG: 377 | End: 2023-06-23
Payer: COMMERCIAL

## 2023-06-23 ENCOUNTER — ANESTHESIA EVENT (OUTPATIENT)
Dept: OPERATING ROOM | Age: 63
DRG: 377 | End: 2023-06-23
Payer: COMMERCIAL

## 2023-06-23 ENCOUNTER — APPOINTMENT (OUTPATIENT)
Dept: CT IMAGING | Age: 63
DRG: 377 | End: 2023-06-23
Payer: COMMERCIAL

## 2023-06-23 LAB
ANION GAP SERPL CALCULATED.3IONS-SCNC: 10 MMOL/L (ref 9–17)
ANION GAP SERPL CALCULATED.3IONS-SCNC: 9 MMOL/L (ref 9–17)
BASOPHILS # BLD: 0 K/UL (ref 0–0.2)
BASOPHILS NFR BLD: 0 %
BUN SERPL-MCNC: 60 MG/DL (ref 8–23)
BUN SERPL-MCNC: 89 MG/DL (ref 8–23)
BUN/CREAT SERPL: 25 (ref 9–20)
BUN/CREAT SERPL: 32 (ref 9–20)
CALCIUM SERPL-MCNC: 7.5 MG/DL (ref 8.6–10.4)
CALCIUM SERPL-MCNC: 7.6 MG/DL (ref 8.6–10.4)
CHLORIDE SERPL-SCNC: 110 MMOL/L (ref 98–107)
CHLORIDE SERPL-SCNC: 111 MMOL/L (ref 98–107)
CO2 SERPL-SCNC: 21 MMOL/L (ref 20–31)
CO2 SERPL-SCNC: 22 MMOL/L (ref 20–31)
CREAT SERPL-MCNC: 2.41 MG/DL (ref 0.7–1.2)
CREAT SERPL-MCNC: 2.78 MG/DL (ref 0.7–1.2)
EOSINOPHIL # BLD: 0.19 K/UL (ref 0–0.4)
EOSINOPHILS RELATIVE PERCENT: 1 % (ref 1–4)
ERYTHROCYTE [DISTWIDTH] IN BLOOD BY AUTOMATED COUNT: 15.9 % (ref 11.8–14.4)
GFR SERPL CREATININE-BSD FRML MDRD: 24 ML/MIN/1.73M2
GFR SERPL CREATININE-BSD FRML MDRD: 29 ML/MIN/1.73M2
GLUCOSE BLD-MCNC: 115 MG/DL (ref 75–110)
GLUCOSE BLD-MCNC: 121 MG/DL (ref 75–110)
GLUCOSE BLD-MCNC: 129 MG/DL (ref 75–110)
GLUCOSE BLD-MCNC: 132 MG/DL (ref 75–110)
GLUCOSE SERPL-MCNC: 145 MG/DL (ref 70–99)
GLUCOSE SERPL-MCNC: 146 MG/DL (ref 70–99)
HCT VFR BLD AUTO: 22.6 % (ref 40.7–50.3)
HCT VFR BLD AUTO: 23.1 % (ref 40.7–50.3)
HCT VFR BLD AUTO: 24.7 % (ref 40.7–50.3)
HCT VFR BLD AUTO: 24.9 % (ref 40.7–50.3)
HCT VFR BLD AUTO: 26.2 % (ref 40.7–50.3)
HGB BLD-MCNC: 7.4 G/DL (ref 13–17)
HGB BLD-MCNC: 7.5 G/DL (ref 13–17)
HGB BLD-MCNC: 8.1 G/DL (ref 13–17)
HGB BLD-MCNC: 8.2 G/DL (ref 13–17)
HGB BLD-MCNC: 8.7 G/DL (ref 13–17)
IMM GRANULOCYTES # BLD AUTO: 0.19 K/UL (ref 0–0.3)
IMM GRANULOCYTES NFR BLD: 1 %
LYMPHOCYTES # BLD: 18 % (ref 24–44)
LYMPHOCYTES NFR BLD: 3.42 K/UL (ref 1–4.8)
MCH RBC QN AUTO: 29.5 PG (ref 25.2–33.5)
MCHC RBC AUTO-ENTMCNC: 33.2 G/DL (ref 28.4–34.8)
MCV RBC AUTO: 88.8 FL (ref 82.6–102.9)
MONOCYTES NFR BLD: 1.52 K/UL (ref 0.2–0.8)
MONOCYTES NFR BLD: 8 % (ref 1–7)
NEUTROPHILS NFR BLD: 72 % (ref 36–66)
NEUTS SEG NFR BLD: 13.68 K/UL (ref 1.8–7.7)
NRBC AUTOMATED: 0 PER 100 WBC
PLATELET # BLD AUTO: 167 K/UL (ref 138–453)
PMV BLD AUTO: 10.7 FL (ref 8.1–13.5)
POTASSIUM SERPL-SCNC: 4.4 MMOL/L (ref 3.7–5.3)
POTASSIUM SERPL-SCNC: 5.3 MMOL/L (ref 3.7–5.3)
RBC # BLD AUTO: 2.78 M/UL (ref 4.21–5.77)
REASON FOR REJECTION: NORMAL
SODIUM SERPL-SCNC: 140 MMOL/L (ref 135–144)
SODIUM SERPL-SCNC: 143 MMOL/L (ref 135–144)
SPECIMEN SOURCE: NORMAL
WBC OTHER # BLD: 19 K/UL (ref 3.5–11.3)
ZZ NTE CLEAN UP: ORDERED TEST: NORMAL

## 2023-06-23 PROCEDURE — 2709999900 HC NON-CHARGEABLE SUPPLY: Performed by: INTERNAL MEDICINE

## 2023-06-23 PROCEDURE — 7100000001 HC PACU RECOVERY - ADDTL 15 MIN: Performed by: INTERNAL MEDICINE

## 2023-06-23 PROCEDURE — 2580000003 HC RX 258: Performed by: INTERNAL MEDICINE

## 2023-06-23 PROCEDURE — 2580000003 HC RX 258: Performed by: ANESTHESIOLOGY

## 2023-06-23 PROCEDURE — 74176 CT ABD & PELVIS W/O CONTRAST: CPT

## 2023-06-23 PROCEDURE — 2580000003 HC RX 258: Performed by: SPECIALIST

## 2023-06-23 PROCEDURE — 36430 TRANSFUSION BLD/BLD COMPNT: CPT

## 2023-06-23 PROCEDURE — 3700000001 HC ADD 15 MINUTES (ANESTHESIA): Performed by: INTERNAL MEDICINE

## 2023-06-23 PROCEDURE — 6360000002 HC RX W HCPCS: Performed by: NURSE PRACTITIONER

## 2023-06-23 PROCEDURE — 6360000002 HC RX W HCPCS: Performed by: INTERNAL MEDICINE

## 2023-06-23 PROCEDURE — 3609017100 HC EGD: Performed by: INTERNAL MEDICINE

## 2023-06-23 PROCEDURE — 3700000000 HC ANESTHESIA ATTENDED CARE: Performed by: INTERNAL MEDICINE

## 2023-06-23 PROCEDURE — C9113 INJ PANTOPRAZOLE SODIUM, VIA: HCPCS | Performed by: INTERNAL MEDICINE

## 2023-06-23 PROCEDURE — 85027 COMPLETE CBC AUTOMATED: CPT

## 2023-06-23 PROCEDURE — 82947 ASSAY GLUCOSE BLOOD QUANT: CPT

## 2023-06-23 PROCEDURE — 36415 COLL VENOUS BLD VENIPUNCTURE: CPT

## 2023-06-23 PROCEDURE — 2500000003 HC RX 250 WO HCPCS: Performed by: INTERNAL MEDICINE

## 2023-06-23 PROCEDURE — 6360000002 HC RX W HCPCS: Performed by: SPECIALIST

## 2023-06-23 PROCEDURE — 99233 SBSQ HOSP IP/OBS HIGH 50: CPT | Performed by: INTERNAL MEDICINE

## 2023-06-23 PROCEDURE — 2000000000 HC ICU R&B

## 2023-06-23 PROCEDURE — 6360000002 HC RX W HCPCS: Performed by: NURSE ANESTHETIST, CERTIFIED REGISTERED

## 2023-06-23 PROCEDURE — 85014 HEMATOCRIT: CPT

## 2023-06-23 PROCEDURE — 80048 BASIC METABOLIC PNL TOTAL CA: CPT

## 2023-06-23 PROCEDURE — P9016 RBC LEUKOCYTES REDUCED: HCPCS

## 2023-06-23 PROCEDURE — 0DJ08ZZ INSPECTION OF UPPER INTESTINAL TRACT, VIA NATURAL OR ARTIFICIAL OPENING ENDOSCOPIC: ICD-10-PCS | Performed by: INTERNAL MEDICINE

## 2023-06-23 PROCEDURE — 85018 HEMOGLOBIN: CPT

## 2023-06-23 PROCEDURE — 7100000000 HC PACU RECOVERY - FIRST 15 MIN: Performed by: INTERNAL MEDICINE

## 2023-06-23 RX ORDER — SODIUM CHLORIDE 9 MG/ML
INJECTION, SOLUTION INTRAVENOUS CONTINUOUS PRN
Status: DISCONTINUED | OUTPATIENT
Start: 2023-06-23 | End: 2023-06-23 | Stop reason: SDUPTHER

## 2023-06-23 RX ORDER — 0.9 % SODIUM CHLORIDE 0.9 %
500 INTRAVENOUS SOLUTION INTRAVENOUS ONCE
Status: COMPLETED | OUTPATIENT
Start: 2023-06-23 | End: 2023-06-23

## 2023-06-23 RX ORDER — SUCCINYLCHOLINE/SOD CL,ISO/PF 100 MG/5ML
SYRINGE (ML) INTRAVENOUS PRN
Status: DISCONTINUED | OUTPATIENT
Start: 2023-06-23 | End: 2023-06-23 | Stop reason: SDUPTHER

## 2023-06-23 RX ORDER — SODIUM CHLORIDE 0.9 % (FLUSH) 0.9 %
5-40 SYRINGE (ML) INJECTION PRN
Status: DISCONTINUED | OUTPATIENT
Start: 2023-06-23 | End: 2023-06-23

## 2023-06-23 RX ORDER — SODIUM CHLORIDE 0.9 % (FLUSH) 0.9 %
5-40 SYRINGE (ML) INJECTION EVERY 12 HOURS SCHEDULED
Status: DISCONTINUED | OUTPATIENT
Start: 2023-06-23 | End: 2023-06-26 | Stop reason: HOSPADM

## 2023-06-23 RX ORDER — MORPHINE SULFATE 2 MG/ML
2 INJECTION, SOLUTION INTRAMUSCULAR; INTRAVENOUS ONCE
Status: COMPLETED | OUTPATIENT
Start: 2023-06-23 | End: 2023-06-23

## 2023-06-23 RX ORDER — SODIUM CHLORIDE 9 MG/ML
INJECTION, SOLUTION INTRAVENOUS PRN
Status: DISCONTINUED | OUTPATIENT
Start: 2023-06-23 | End: 2023-06-26 | Stop reason: HOSPADM

## 2023-06-23 RX ORDER — PROPOFOL 10 MG/ML
INJECTION, EMULSION INTRAVENOUS PRN
Status: DISCONTINUED | OUTPATIENT
Start: 2023-06-23 | End: 2023-06-23 | Stop reason: SDUPTHER

## 2023-06-23 RX ORDER — MORPHINE SULFATE 2 MG/ML
2 INJECTION, SOLUTION INTRAMUSCULAR; INTRAVENOUS EVERY 4 HOURS PRN
Status: DISCONTINUED | OUTPATIENT
Start: 2023-06-23 | End: 2023-06-26 | Stop reason: HOSPADM

## 2023-06-23 RX ORDER — SODIUM CHLORIDE 9 MG/ML
INJECTION, SOLUTION INTRAVENOUS PRN
Status: DISCONTINUED | OUTPATIENT
Start: 2023-06-23 | End: 2023-06-23

## 2023-06-23 RX ADMIN — METOPROLOL TARTRATE 5 MG: 5 INJECTION INTRAVENOUS at 09:55

## 2023-06-23 RX ADMIN — DILTIAZEM HYDROCHLORIDE 10 MG: 5 INJECTION INTRAVENOUS at 09:55

## 2023-06-23 RX ADMIN — Medication 100 MG: at 15:21

## 2023-06-23 RX ADMIN — PROPOFOL 130 MG: 10 INJECTION, EMULSION INTRAVENOUS at 15:21

## 2023-06-23 RX ADMIN — METOPROLOL TARTRATE 5 MG: 5 INJECTION INTRAVENOUS at 23:22

## 2023-06-23 RX ADMIN — DILTIAZEM HYDROCHLORIDE 10 MG: 5 INJECTION INTRAVENOUS at 17:52

## 2023-06-23 RX ADMIN — PANTOPRAZOLE SODIUM 8 MG/HR: 40 INJECTION, POWDER, LYOPHILIZED, FOR SOLUTION INTRAVENOUS at 03:27

## 2023-06-23 RX ADMIN — SODIUM CHLORIDE, PRESERVATIVE FREE 10 ML: 5 INJECTION INTRAVENOUS at 23:23

## 2023-06-23 RX ADMIN — MORPHINE SULFATE 2 MG: 2 INJECTION, SOLUTION INTRAMUSCULAR; INTRAVENOUS at 00:47

## 2023-06-23 RX ADMIN — METOPROLOL TARTRATE 5 MG: 5 INJECTION INTRAVENOUS at 17:51

## 2023-06-23 RX ADMIN — Medication 2 MG: at 23:22

## 2023-06-23 RX ADMIN — SODIUM BICARBONATE: 84 INJECTION, SOLUTION INTRAVENOUS at 20:06

## 2023-06-23 RX ADMIN — SODIUM BICARBONATE: 84 INJECTION, SOLUTION INTRAVENOUS at 05:26

## 2023-06-23 RX ADMIN — Medication 2 MG: at 10:16

## 2023-06-23 RX ADMIN — PHENYLEPHRINE HYDROCHLORIDE 100 MCG: 10 INJECTION INTRAVENOUS at 15:21

## 2023-06-23 RX ADMIN — METOPROLOL TARTRATE 5 MG: 5 INJECTION INTRAVENOUS at 05:34

## 2023-06-23 RX ADMIN — SODIUM CHLORIDE: 9 INJECTION, SOLUTION INTRAVENOUS at 15:00

## 2023-06-23 RX ADMIN — PANTOPRAZOLE SODIUM 8 MG/HR: 40 INJECTION, POWDER, LYOPHILIZED, FOR SOLUTION INTRAVENOUS at 13:41

## 2023-06-23 RX ADMIN — METOPROLOL TARTRATE 5 MG: 5 INJECTION INTRAVENOUS at 01:32

## 2023-06-23 RX ADMIN — DILTIAZEM HYDROCHLORIDE 10 MG: 5 INJECTION INTRAVENOUS at 03:25

## 2023-06-23 RX ADMIN — SODIUM CHLORIDE, PRESERVATIVE FREE 10 ML: 5 INJECTION INTRAVENOUS at 09:56

## 2023-06-23 RX ADMIN — SODIUM CHLORIDE 500 ML: 9 INJECTION, SOLUTION INTRAVENOUS at 07:34

## 2023-06-23 ASSESSMENT — PAIN DESCRIPTION - LOCATION
LOCATION: BACK
LOCATION: BACK;ABDOMEN
LOCATION: BACK
LOCATION: ABDOMEN;BACK

## 2023-06-23 ASSESSMENT — PAIN SCALES - GENERAL
PAINLEVEL_OUTOF10: 6
PAINLEVEL_OUTOF10: 2
PAINLEVEL_OUTOF10: 1
PAINLEVEL_OUTOF10: 7
PAINLEVEL_OUTOF10: 4
PAINLEVEL_OUTOF10: 5
PAINLEVEL_OUTOF10: 2
PAINLEVEL_OUTOF10: 0

## 2023-06-23 ASSESSMENT — PAIN DESCRIPTION - ORIENTATION
ORIENTATION: MID

## 2023-06-23 ASSESSMENT — PAIN DESCRIPTION - DESCRIPTORS
DESCRIPTORS: DULL
DESCRIPTORS: ACHING;DULL
DESCRIPTORS: ACHING
DESCRIPTORS: ACHING;DISCOMFORT
DESCRIPTORS: DULL

## 2023-06-23 ASSESSMENT — PAIN - FUNCTIONAL ASSESSMENT
PAIN_FUNCTIONAL_ASSESSMENT: ACTIVITIES ARE NOT PREVENTED
PAIN_FUNCTIONAL_ASSESSMENT: PREVENTS OR INTERFERES SOME ACTIVE ACTIVITIES AND ADLS

## 2023-06-23 ASSESSMENT — ENCOUNTER SYMPTOMS: SHORTNESS OF BREATH: 0

## 2023-06-23 NOTE — ANESTHESIA PRE PROCEDURE
Department of Anesthesiology  Preprocedure Note       Name:  Vibha Bernabe II   Age:  70 y.o.  :  1960                                          MRN:  [id number]         Date:  2023      Surgeon: Bill Menendez):  Amparo Quiles MD    Procedure: Procedure(s):  EGD ESOPHAGOGASTRODUODENOSCOPY    Medications prior to admission:   Prior to Admission medications    Medication Sig Start Date End Date Taking?  Authorizing Provider   simethicone (MYLICON) 80 MG chewable tablet Take 1 tablet by mouth every 6 hours as needed for Flatulence    Historical Provider, MD   Continuous Blood Gluc Sensor (FREESTYLE RANJIT 14 DAY SENSOR) MISC 1 EACH BY DOES NOT APPLY ROUTE CONTINUOUS 23   TORREY Starr CNP   pravastatin (PRAVACHOL) 20 MG tablet TAKE 1 TABLET BY MOUTH EVERY DAY 23   TORREY Starr CNP   insulin glargine (LANTUS SOLOSTAR) 100 UNIT/ML injection pen Inject 20-30 units of insulin subcutaneously once daily as directed  Patient taking differently: 20 Units nightly Inject 20-30 units of insulin subcutaneously once daily as directed 23   TORREY Starr CNP   zaleplon (SONATA) 10 MG capsule TAKE 1 CAPSULE BY MOUTH NIGHTLY  Patient not taking: Reported on 2023  TORREY Starr CNP   vitamin D (ERGOCALCIFEROL) 1.25 MG (57937 UT) CAPS capsule TAKE 1 CAPSULE BY MOUTH MONTHLY FOR 6 MONTHS 90 23   Historical Provider, MD NG ULTRAFINE III SHORT PEN 31G X 8 MM MISC  3/9/23   Historical Provider, MD   Semaglutide, 1 MG/DOSE, (OZEMPIC, 1 MG/DOSE,) 4 MG/3ML SOPN Inject 1 mg into the skin once a week  Patient not taking: Reported on 2023 3/20/23   TORREY Starr CNP   cyclobenzaprine (FLEXERIL) 10 MG tablet TAKE 1 TABLET BY MOUTH 2 TIMES DAILY AS NEEDED FOR MUSCLE SPASMS. 3/7/23   TORREY Starr CNP   vitamin E 1000 units capsule Take 1 capsule by mouth daily    Historical Provider, MD   DULoxetine (CYMBALTA) 20 MG extended

## 2023-06-23 NOTE — ANESTHESIA POSTPROCEDURE EVALUATION
Department of Anesthesiology  Postprocedure Note    Patient: Asia Mann  MRN: [id number]  YOB: 1960  Date of evaluation: 6/23/2023      Procedure Summary     Date: 06/23/23 Room / Location: [place] [address] / [facility] - INPATIENT    Anesthesia Start: 9835 Anesthesia Stop: 9432    Procedure: EGD ESOPHAGOGASTRODUODENOSCOPY (Esophagus) Diagnosis:       Hematemesis, unspecified whether nausea present      (Hematemesis, unspecified whether nausea present [K92.0])    Surgeons: Tian Gray MD Responsible Provider: Monse Chappell MD    Anesthesia Type: general ASA Status: 4          Anesthesia Type: No value filed.     Destiny Phase I: Destiny Score: 10    Destiny Phase II:        Anesthesia Post Evaluation    Complications: no

## 2023-06-24 LAB
ANION GAP SERPL CALCULATED.3IONS-SCNC: 9 MMOL/L (ref 9–17)
BASOPHILS # BLD: 0.03 K/UL (ref 0–0.2)
BASOPHILS NFR BLD: 0 % (ref 0–2)
BUN SERPL-MCNC: 50 MG/DL (ref 8–23)
BUN/CREAT SERPL: 22 (ref 9–20)
CALCIUM SERPL-MCNC: 7.6 MG/DL (ref 8.6–10.4)
CHLORIDE SERPL-SCNC: 110 MMOL/L (ref 98–107)
CO2 SERPL-SCNC: 23 MMOL/L (ref 20–31)
CREAT SERPL-MCNC: 2.31 MG/DL (ref 0.7–1.2)
EOSINOPHIL # BLD: 0.27 K/UL (ref 0–0.44)
EOSINOPHILS RELATIVE PERCENT: 2 % (ref 1–4)
ERYTHROCYTE [DISTWIDTH] IN BLOOD BY AUTOMATED COUNT: 16 % (ref 11.8–14.4)
GFR SERPL CREATININE-BSD FRML MDRD: 31 ML/MIN/1.73M2
GLUCOSE BLD-MCNC: 144 MG/DL (ref 75–110)
GLUCOSE BLD-MCNC: 158 MG/DL (ref 75–110)
GLUCOSE BLD-MCNC: 99 MG/DL (ref 75–110)
GLUCOSE SERPL-MCNC: 124 MG/DL (ref 70–99)
HCT VFR BLD AUTO: 22 % (ref 40.7–50.3)
HCT VFR BLD AUTO: 22.1 % (ref 40.7–50.3)
HCT VFR BLD AUTO: 23 % (ref 40.7–50.3)
HCT VFR BLD AUTO: 23.4 % (ref 40.7–50.3)
HCT VFR BLD AUTO: 23.5 % (ref 40.7–50.3)
HGB BLD-MCNC: 7.1 G/DL (ref 13–17)
HGB BLD-MCNC: 7.2 G/DL (ref 13–17)
HGB BLD-MCNC: 7.4 G/DL (ref 13–17)
HGB BLD-MCNC: 7.4 G/DL (ref 13–17)
HGB BLD-MCNC: 7.5 G/DL (ref 13–17)
IMM GRANULOCYTES # BLD AUTO: 0.06 K/UL (ref 0–0.3)
IMM GRANULOCYTES NFR BLD: 1 %
LYMPHOCYTES # BLD: 14 % (ref 24–43)
LYMPHOCYTES NFR BLD: 1.6 K/UL (ref 1.1–3.7)
MCH RBC QN AUTO: 28.7 PG (ref 25.2–33.5)
MCHC RBC AUTO-ENTMCNC: 32.2 G/DL (ref 28.4–34.8)
MCV RBC AUTO: 89.1 FL (ref 82.6–102.9)
MONOCYTES NFR BLD: 0.87 K/UL (ref 0.1–1.2)
MONOCYTES NFR BLD: 8 % (ref 3–12)
NEUTROPHILS NFR BLD: 75 % (ref 36–65)
NEUTS SEG NFR BLD: 8.39 K/UL (ref 1.5–8.1)
NRBC BLD-RTO: 0 PER 100 WBC
PLATELET # BLD AUTO: 137 K/UL (ref 138–453)
PMV BLD AUTO: 10.6 FL (ref 8.1–13.5)
POTASSIUM SERPL-SCNC: 4.4 MMOL/L (ref 3.7–5.3)
RBC # BLD AUTO: 2.58 M/UL (ref 4.21–5.77)
RBC # BLD: ABNORMAL 10*6/UL
SODIUM SERPL-SCNC: 142 MMOL/L (ref 135–144)
WBC OTHER # BLD: 11.2 K/UL (ref 3.5–11.3)

## 2023-06-24 PROCEDURE — 6360000002 HC RX W HCPCS: Performed by: INTERNAL MEDICINE

## 2023-06-24 PROCEDURE — 85027 COMPLETE CBC AUTOMATED: CPT

## 2023-06-24 PROCEDURE — C9113 INJ PANTOPRAZOLE SODIUM, VIA: HCPCS | Performed by: INTERNAL MEDICINE

## 2023-06-24 PROCEDURE — 2580000003 HC RX 258: Performed by: ANESTHESIOLOGY

## 2023-06-24 PROCEDURE — 2060000000 HC ICU INTERMEDIATE R&B

## 2023-06-24 PROCEDURE — 85014 HEMATOCRIT: CPT

## 2023-06-24 PROCEDURE — 36415 COLL VENOUS BLD VENIPUNCTURE: CPT

## 2023-06-24 PROCEDURE — 82947 ASSAY GLUCOSE BLOOD QUANT: CPT

## 2023-06-24 PROCEDURE — 85018 HEMOGLOBIN: CPT

## 2023-06-24 PROCEDURE — 99232 SBSQ HOSP IP/OBS MODERATE 35: CPT | Performed by: INTERNAL MEDICINE

## 2023-06-24 PROCEDURE — 2580000003 HC RX 258: Performed by: INTERNAL MEDICINE

## 2023-06-24 PROCEDURE — 6370000000 HC RX 637 (ALT 250 FOR IP): Performed by: INTERNAL MEDICINE

## 2023-06-24 PROCEDURE — 80048 BASIC METABOLIC PNL TOTAL CA: CPT

## 2023-06-24 PROCEDURE — 2500000003 HC RX 250 WO HCPCS: Performed by: INTERNAL MEDICINE

## 2023-06-24 RX ORDER — DILTIAZEM HYDROCHLORIDE 60 MG/1
60 TABLET, FILM COATED ORAL EVERY 12 HOURS
Status: DISCONTINUED | OUTPATIENT
Start: 2023-06-24 | End: 2023-06-26 | Stop reason: HOSPADM

## 2023-06-24 RX ORDER — METOPROLOL TARTRATE 5 MG/5ML
5 INJECTION INTRAVENOUS EVERY 6 HOURS PRN
Status: DISCONTINUED | OUTPATIENT
Start: 2023-06-24 | End: 2023-06-26 | Stop reason: HOSPADM

## 2023-06-24 RX ORDER — SODIUM CHLORIDE 9 MG/ML
INJECTION, SOLUTION INTRAVENOUS CONTINUOUS
Status: DISCONTINUED | OUTPATIENT
Start: 2023-06-24 | End: 2023-06-26 | Stop reason: HOSPADM

## 2023-06-24 RX ORDER — LANOLIN ALCOHOL/MO/W.PET/CERES
3 CREAM (GRAM) TOPICAL NIGHTLY PRN
Status: DISCONTINUED | OUTPATIENT
Start: 2023-06-24 | End: 2023-06-26 | Stop reason: HOSPADM

## 2023-06-24 RX ORDER — DULOXETIN HYDROCHLORIDE 20 MG/1
20 CAPSULE, DELAYED RELEASE ORAL DAILY
Status: DISCONTINUED | OUTPATIENT
Start: 2023-06-24 | End: 2023-06-26 | Stop reason: HOSPADM

## 2023-06-24 RX ORDER — METOPROLOL TARTRATE 50 MG/1
100 TABLET, FILM COATED ORAL 2 TIMES DAILY
Status: DISCONTINUED | OUTPATIENT
Start: 2023-06-24 | End: 2023-06-26 | Stop reason: HOSPADM

## 2023-06-24 RX ORDER — CYCLOBENZAPRINE HCL 10 MG
10 TABLET ORAL 2 TIMES DAILY PRN
Status: DISCONTINUED | OUTPATIENT
Start: 2023-06-24 | End: 2023-06-26 | Stop reason: HOSPADM

## 2023-06-24 RX ADMIN — Medication 2 MG: at 05:58

## 2023-06-24 RX ADMIN — IRON SUCROSE 200 MG: 20 INJECTION, SOLUTION INTRAVENOUS at 16:19

## 2023-06-24 RX ADMIN — METOPROLOL TARTRATE 5 MG: 5 INJECTION INTRAVENOUS at 05:58

## 2023-06-24 RX ADMIN — CYCLOBENZAPRINE 10 MG: 10 TABLET, FILM COATED ORAL at 11:28

## 2023-06-24 RX ADMIN — SODIUM CHLORIDE, PRESERVATIVE FREE 10 ML: 5 INJECTION INTRAVENOUS at 19:45

## 2023-06-24 RX ADMIN — PANTOPRAZOLE SODIUM 8 MG/HR: 40 INJECTION, POWDER, LYOPHILIZED, FOR SOLUTION INTRAVENOUS at 00:41

## 2023-06-24 RX ADMIN — METOPROLOL TARTRATE 100 MG: 50 TABLET, FILM COATED ORAL at 08:06

## 2023-06-24 RX ADMIN — SODIUM CHLORIDE: 9 INJECTION, SOLUTION INTRAVENOUS at 07:16

## 2023-06-24 RX ADMIN — DILTIAZEM HYDROCHLORIDE 60 MG: 60 TABLET, FILM COATED ORAL at 08:06

## 2023-06-24 RX ADMIN — METOPROLOL TARTRATE 5 MG: 5 INJECTION INTRAVENOUS at 02:43

## 2023-06-24 RX ADMIN — DULOXETINE HYDROCHLORIDE 20 MG: 20 CAPSULE, DELAYED RELEASE ORAL at 08:06

## 2023-06-24 RX ADMIN — SODIUM CHLORIDE, PRESERVATIVE FREE 10 ML: 5 INJECTION INTRAVENOUS at 08:06

## 2023-06-24 RX ADMIN — PANTOPRAZOLE SODIUM 8 MG/HR: 40 INJECTION, POWDER, LYOPHILIZED, FOR SOLUTION INTRAVENOUS at 20:23

## 2023-06-24 RX ADMIN — METOPROLOL TARTRATE 100 MG: 50 TABLET, FILM COATED ORAL at 19:44

## 2023-06-24 RX ADMIN — PANTOPRAZOLE SODIUM 8 MG/HR: 40 INJECTION, POWDER, LYOPHILIZED, FOR SOLUTION INTRAVENOUS at 10:44

## 2023-06-24 RX ADMIN — DILTIAZEM HYDROCHLORIDE 60 MG: 60 TABLET, FILM COATED ORAL at 19:43

## 2023-06-24 ASSESSMENT — PAIN SCALES - GENERAL
PAINLEVEL_OUTOF10: 6
PAINLEVEL_OUTOF10: 5

## 2023-06-24 ASSESSMENT — PAIN DESCRIPTION - DESCRIPTORS: DESCRIPTORS: ACHING

## 2023-06-24 ASSESSMENT — PAIN DESCRIPTION - LOCATION
LOCATION: BACK
LOCATION: BACK

## 2023-06-24 ASSESSMENT — PAIN DESCRIPTION - ORIENTATION: ORIENTATION: MID

## 2023-06-25 LAB
ABO/RH: NORMAL
ANION GAP SERPL CALCULATED.3IONS-SCNC: 8 MMOL/L (ref 9–17)
ANTIBODY SCREEN: NEGATIVE
ARM BAND NUMBER: NORMAL
BLD PROD TYP BPU: NORMAL
BPU ID: NORMAL
BUN SERPL-MCNC: 32 MG/DL (ref 8–23)
BUN/CREAT SERPL: 17 (ref 9–20)
CALCIUM SERPL-MCNC: 7.6 MG/DL (ref 8.6–10.4)
CHLORIDE SERPL-SCNC: 111 MMOL/L (ref 98–107)
CO2 SERPL-SCNC: 21 MMOL/L (ref 20–31)
CREAT SERPL-MCNC: 1.93 MG/DL (ref 0.7–1.2)
CROSSMATCH RESULT: NORMAL
DISPENSE STATUS BLOOD BANK: NORMAL
EXPIRATION DATE: NORMAL
GFR SERPL CREATININE-BSD FRML MDRD: 38 ML/MIN/1.73M2
GLUCOSE BLD-MCNC: 120 MG/DL (ref 75–110)
GLUCOSE BLD-MCNC: 135 MG/DL (ref 75–110)
GLUCOSE BLD-MCNC: 95 MG/DL (ref 75–110)
GLUCOSE SERPL-MCNC: 107 MG/DL (ref 70–99)
HCT VFR BLD AUTO: 22.9 % (ref 40.7–50.3)
HCT VFR BLD AUTO: 23.4 % (ref 40.7–50.3)
HCT VFR BLD AUTO: 26.2 % (ref 40.7–50.3)
HGB BLD-MCNC: 7.4 G/DL (ref 13–17)
HGB BLD-MCNC: 7.4 G/DL (ref 13–17)
HGB BLD-MCNC: 8.2 G/DL (ref 13–17)
POTASSIUM SERPL-SCNC: 4.2 MMOL/L (ref 3.7–5.3)
SODIUM SERPL-SCNC: 140 MMOL/L (ref 135–144)
TRANSFUSION STATUS: NORMAL
UNIT DIVISION: 0

## 2023-06-25 PROCEDURE — 2580000003 HC RX 258: Performed by: ANESTHESIOLOGY

## 2023-06-25 PROCEDURE — 6360000002 HC RX W HCPCS: Performed by: INTERNAL MEDICINE

## 2023-06-25 PROCEDURE — 99232 SBSQ HOSP IP/OBS MODERATE 35: CPT | Performed by: INTERNAL MEDICINE

## 2023-06-25 PROCEDURE — 6370000000 HC RX 637 (ALT 250 FOR IP): Performed by: NURSE PRACTITIONER

## 2023-06-25 PROCEDURE — 2580000003 HC RX 258: Performed by: INTERNAL MEDICINE

## 2023-06-25 PROCEDURE — 80048 BASIC METABOLIC PNL TOTAL CA: CPT

## 2023-06-25 PROCEDURE — C9113 INJ PANTOPRAZOLE SODIUM, VIA: HCPCS | Performed by: INTERNAL MEDICINE

## 2023-06-25 PROCEDURE — 6370000000 HC RX 637 (ALT 250 FOR IP): Performed by: INTERNAL MEDICINE

## 2023-06-25 PROCEDURE — 36415 COLL VENOUS BLD VENIPUNCTURE: CPT

## 2023-06-25 PROCEDURE — 85014 HEMATOCRIT: CPT

## 2023-06-25 PROCEDURE — 6360000002 HC RX W HCPCS: Performed by: NURSE PRACTITIONER

## 2023-06-25 PROCEDURE — 85018 HEMOGLOBIN: CPT

## 2023-06-25 PROCEDURE — 82947 ASSAY GLUCOSE BLOOD QUANT: CPT

## 2023-06-25 PROCEDURE — 2060000000 HC ICU INTERMEDIATE R&B

## 2023-06-25 RX ORDER — DIPHENHYDRAMINE HYDROCHLORIDE 50 MG/ML
25 INJECTION INTRAMUSCULAR; INTRAVENOUS EVERY 6 HOURS PRN
Status: DISCONTINUED | OUTPATIENT
Start: 2023-06-25 | End: 2023-06-26 | Stop reason: HOSPADM

## 2023-06-25 RX ORDER — PANTOPRAZOLE SODIUM 40 MG/1
40 TABLET, DELAYED RELEASE ORAL
Status: DISCONTINUED | OUTPATIENT
Start: 2023-06-25 | End: 2023-06-26 | Stop reason: HOSPADM

## 2023-06-25 RX ADMIN — Medication 2 MG: at 06:13

## 2023-06-25 RX ADMIN — METOPROLOL TARTRATE 100 MG: 50 TABLET, FILM COATED ORAL at 20:36

## 2023-06-25 RX ADMIN — DIPHENHYDRAMINE HYDROCHLORIDE 25 MG: 50 INJECTION, SOLUTION INTRAMUSCULAR; INTRAVENOUS at 22:27

## 2023-06-25 RX ADMIN — SODIUM CHLORIDE, PRESERVATIVE FREE 10 ML: 5 INJECTION INTRAVENOUS at 08:14

## 2023-06-25 RX ADMIN — SODIUM CHLORIDE: 9 INJECTION, SOLUTION INTRAVENOUS at 16:15

## 2023-06-25 RX ADMIN — DILTIAZEM HYDROCHLORIDE 60 MG: 60 TABLET, FILM COATED ORAL at 08:13

## 2023-06-25 RX ADMIN — DULOXETINE HYDROCHLORIDE 20 MG: 20 CAPSULE, DELAYED RELEASE ORAL at 08:13

## 2023-06-25 RX ADMIN — PANTOPRAZOLE SODIUM 40 MG: 40 TABLET, DELAYED RELEASE ORAL at 17:19

## 2023-06-25 RX ADMIN — Medication 3 MG: at 00:13

## 2023-06-25 RX ADMIN — CYCLOBENZAPRINE 10 MG: 10 TABLET, FILM COATED ORAL at 00:13

## 2023-06-25 RX ADMIN — PANTOPRAZOLE SODIUM 8 MG/HR: 40 INJECTION, POWDER, LYOPHILIZED, FOR SOLUTION INTRAVENOUS at 05:49

## 2023-06-25 RX ADMIN — SODIUM CHLORIDE: 9 INJECTION, SOLUTION INTRAVENOUS at 00:16

## 2023-06-25 RX ADMIN — METOPROLOL TARTRATE 100 MG: 50 TABLET, FILM COATED ORAL at 08:13

## 2023-06-25 RX ADMIN — IRON SUCROSE 200 MG: 20 INJECTION, SOLUTION INTRAVENOUS at 16:43

## 2023-06-25 RX ADMIN — DILTIAZEM HYDROCHLORIDE 60 MG: 60 TABLET, FILM COATED ORAL at 20:36

## 2023-06-25 ASSESSMENT — PAIN DESCRIPTION - FREQUENCY: FREQUENCY: CONTINUOUS

## 2023-06-25 ASSESSMENT — PAIN DESCRIPTION - DESCRIPTORS
DESCRIPTORS: ACHING
DESCRIPTORS: DISCOMFORT

## 2023-06-25 ASSESSMENT — PAIN DESCRIPTION - ORIENTATION: ORIENTATION: LOWER

## 2023-06-25 ASSESSMENT — PAIN DESCRIPTION - LOCATION
LOCATION: BACK
LOCATION: BACK;ABDOMEN

## 2023-06-25 ASSESSMENT — PAIN DESCRIPTION - ONSET: ONSET: ON-GOING

## 2023-06-25 ASSESSMENT — PAIN DESCRIPTION - DIRECTION: RADIATING_TOWARDS: NONRADIATING PER PATIENT

## 2023-06-25 ASSESSMENT — PAIN DESCRIPTION - PAIN TYPE: TYPE: CHRONIC PAIN

## 2023-06-25 ASSESSMENT — PAIN SCALES - GENERAL
PAINLEVEL_OUTOF10: 7
PAINLEVEL_OUTOF10: 5
PAINLEVEL_OUTOF10: 4

## 2023-06-25 ASSESSMENT — PAIN - FUNCTIONAL ASSESSMENT: PAIN_FUNCTIONAL_ASSESSMENT: ACTIVITIES ARE NOT PREVENTED

## 2023-06-26 VITALS
HEIGHT: 71 IN | TEMPERATURE: 97.2 F | BODY MASS INDEX: 36.4 KG/M2 | HEART RATE: 86 BPM | WEIGHT: 260 LBS | SYSTOLIC BLOOD PRESSURE: 139 MMHG | OXYGEN SATURATION: 99 % | DIASTOLIC BLOOD PRESSURE: 77 MMHG | RESPIRATION RATE: 18 BRPM

## 2023-06-26 LAB
ANION GAP SERPL CALCULATED.3IONS-SCNC: 11 MMOL/L (ref 9–17)
BASOPHILS # BLD: 0.04 K/UL (ref 0–0.2)
BASOPHILS NFR BLD: 0 % (ref 0–2)
BUN SERPL-MCNC: 28 MG/DL (ref 8–23)
BUN/CREAT SERPL: 15 (ref 9–20)
CALCIUM SERPL-MCNC: 8.3 MG/DL (ref 8.6–10.4)
CHLORIDE SERPL-SCNC: 106 MMOL/L (ref 98–107)
CO2 SERPL-SCNC: 19 MMOL/L (ref 20–31)
CREAT SERPL-MCNC: 1.82 MG/DL (ref 0.7–1.2)
EOSINOPHIL # BLD: 0.47 K/UL (ref 0–0.44)
EOSINOPHILS RELATIVE PERCENT: 5 % (ref 1–4)
ERYTHROCYTE [DISTWIDTH] IN BLOOD BY AUTOMATED COUNT: 16.1 % (ref 11.8–14.4)
GFR SERPL CREATININE-BSD FRML MDRD: 41 ML/MIN/1.73M2
GLUCOSE BLD-MCNC: 101 MG/DL (ref 75–110)
GLUCOSE BLD-MCNC: 108 MG/DL (ref 75–110)
GLUCOSE BLD-MCNC: 111 MG/DL (ref 75–110)
GLUCOSE BLD-MCNC: 160 MG/DL (ref 75–110)
GLUCOSE SERPL-MCNC: 113 MG/DL (ref 70–99)
HCT VFR BLD AUTO: 26.4 % (ref 40.7–50.3)
HCT VFR BLD AUTO: 26.8 % (ref 40.7–50.3)
HGB BLD-MCNC: 8.3 G/DL (ref 13–17)
HGB BLD-MCNC: 8.5 G/DL (ref 13–17)
IMM GRANULOCYTES # BLD AUTO: 0.09 K/UL (ref 0–0.3)
IMM GRANULOCYTES NFR BLD: 1 %
LYMPHOCYTES # BLD: 11 % (ref 24–43)
LYMPHOCYTES NFR BLD: 1.18 K/UL (ref 1.1–3.7)
MCH RBC QN AUTO: 29.1 PG (ref 25.2–33.5)
MCHC RBC AUTO-ENTMCNC: 31.4 G/DL (ref 28.4–34.8)
MCV RBC AUTO: 92.6 FL (ref 82.6–102.9)
MICROORGANISM SPEC CULT: NORMAL
MICROORGANISM SPEC CULT: NORMAL
MONOCYTES NFR BLD: 0.96 K/UL (ref 0.1–1.2)
MONOCYTES NFR BLD: 9 % (ref 3–12)
NEUTROPHILS NFR BLD: 74 % (ref 36–65)
NEUTS SEG NFR BLD: 7.72 K/UL (ref 1.5–8.1)
NRBC BLD-RTO: 0 PER 100 WBC
PLATELET # BLD AUTO: 191 K/UL (ref 138–453)
PMV BLD AUTO: 10.7 FL (ref 8.1–13.5)
POTASSIUM SERPL-SCNC: 4.4 MMOL/L (ref 3.7–5.3)
RBC # BLD AUTO: 2.85 M/UL (ref 4.21–5.77)
RBC # BLD: ABNORMAL 10*6/UL
SERVICE CMNT-IMP: NORMAL
SERVICE CMNT-IMP: NORMAL
SODIUM SERPL-SCNC: 136 MMOL/L (ref 135–144)
SPECIMEN DESCRIPTION: NORMAL
SPECIMEN DESCRIPTION: NORMAL
WBC OTHER # BLD: 10.5 K/UL (ref 3.5–11.3)

## 2023-06-26 PROCEDURE — 2580000003 HC RX 258: Performed by: ANESTHESIOLOGY

## 2023-06-26 PROCEDURE — 80048 BASIC METABOLIC PNL TOTAL CA: CPT

## 2023-06-26 PROCEDURE — 85014 HEMATOCRIT: CPT

## 2023-06-26 PROCEDURE — 6370000000 HC RX 637 (ALT 250 FOR IP): Performed by: INTERNAL MEDICINE

## 2023-06-26 PROCEDURE — 82947 ASSAY GLUCOSE BLOOD QUANT: CPT

## 2023-06-26 PROCEDURE — 2580000003 HC RX 258: Performed by: INTERNAL MEDICINE

## 2023-06-26 PROCEDURE — 99238 HOSP IP/OBS DSCHRG MGMT 30/<: CPT | Performed by: FAMILY MEDICINE

## 2023-06-26 PROCEDURE — 36415 COLL VENOUS BLD VENIPUNCTURE: CPT

## 2023-06-26 PROCEDURE — 6360000002 HC RX W HCPCS: Performed by: INTERNAL MEDICINE

## 2023-06-26 PROCEDURE — 6370000000 HC RX 637 (ALT 250 FOR IP): Performed by: NURSE PRACTITIONER

## 2023-06-26 PROCEDURE — 85018 HEMOGLOBIN: CPT

## 2023-06-26 PROCEDURE — 85027 COMPLETE CBC AUTOMATED: CPT

## 2023-06-26 RX ORDER — PEN NEEDLE, DIABETIC 31 GX5/16"
NEEDLE, DISPOSABLE MISCELLANEOUS
Qty: 300 EACH | Refills: 1 | Status: SHIPPED | OUTPATIENT
Start: 2023-06-26

## 2023-06-26 RX ORDER — PANTOPRAZOLE SODIUM 40 MG/1
40 TABLET, DELAYED RELEASE ORAL
Qty: 30 TABLET | Refills: 3 | Status: SHIPPED | OUTPATIENT
Start: 2023-06-26

## 2023-06-26 RX ORDER — FERROUS SULFATE 325(65) MG
325 TABLET ORAL 2 TIMES DAILY
Qty: 180 TABLET | Refills: 1 | Status: SHIPPED | OUTPATIENT
Start: 2023-06-26

## 2023-06-26 RX ADMIN — METOPROLOL TARTRATE 100 MG: 50 TABLET, FILM COATED ORAL at 08:14

## 2023-06-26 RX ADMIN — Medication 2 MG: at 04:02

## 2023-06-26 RX ADMIN — PANTOPRAZOLE SODIUM 40 MG: 40 TABLET, DELAYED RELEASE ORAL at 17:02

## 2023-06-26 RX ADMIN — PANTOPRAZOLE SODIUM 40 MG: 40 TABLET, DELAYED RELEASE ORAL at 06:03

## 2023-06-26 RX ADMIN — IRON SUCROSE 200 MG: 20 INJECTION, SOLUTION INTRAVENOUS at 13:14

## 2023-06-26 RX ADMIN — DULOXETINE HYDROCHLORIDE 20 MG: 20 CAPSULE, DELAYED RELEASE ORAL at 08:14

## 2023-06-26 RX ADMIN — SODIUM CHLORIDE, PRESERVATIVE FREE 10 ML: 5 INJECTION INTRAVENOUS at 08:16

## 2023-06-26 RX ADMIN — DILTIAZEM HYDROCHLORIDE 60 MG: 60 TABLET, FILM COATED ORAL at 08:14

## 2023-06-26 ASSESSMENT — PAIN DESCRIPTION - DESCRIPTORS: DESCRIPTORS: CRAMPING

## 2023-06-26 ASSESSMENT — ENCOUNTER SYMPTOMS
CHOKING: 0
WHEEZING: 0
CHEST TIGHTNESS: 0
RHINORRHEA: 0
SHORTNESS OF BREATH: 0
BACK PAIN: 0
VOICE CHANGE: 0
NAUSEA: 0
CONSTIPATION: 0
DIARRHEA: 0
COUGH: 0
ABDOMINAL PAIN: 0
SINUS PRESSURE: 0
VOMITING: 0

## 2023-06-26 ASSESSMENT — PAIN DESCRIPTION - LOCATION: LOCATION: ABDOMEN

## 2023-06-26 ASSESSMENT — PAIN DESCRIPTION - ORIENTATION: ORIENTATION: MID

## 2023-06-26 ASSESSMENT — PAIN SCALES - GENERAL: PAINLEVEL_OUTOF10: 5

## 2023-06-29 ENCOUNTER — TELEPHONE (OUTPATIENT)
Dept: FAMILY MEDICINE CLINIC | Age: 63
End: 2023-06-29

## 2023-07-03 NOTE — TELEPHONE ENCOUNTER
MARIANELA.... Spoke with spouse today and she states one of his other doctors will be filling out [place] paper work for them.

## 2023-07-13 ENCOUNTER — OFFICE VISIT (OUTPATIENT)
Dept: FAMILY MEDICINE CLINIC | Age: 63
End: 2023-07-13
Payer: COMMERCIAL

## 2023-07-13 VITALS
BODY MASS INDEX: 37.02 KG/M2 | DIASTOLIC BLOOD PRESSURE: 62 MMHG | TEMPERATURE: 97.2 F | HEIGHT: 71 IN | SYSTOLIC BLOOD PRESSURE: 114 MMHG | HEART RATE: 71 BPM | OXYGEN SATURATION: 98 % | WEIGHT: 264.4 LBS

## 2023-07-13 DIAGNOSIS — K92.2 UPPER GI BLEED: Primary | ICD-10-CM

## 2023-07-13 DIAGNOSIS — A41.9 SEPTIC SHOCK (HCC): ICD-10-CM

## 2023-07-13 DIAGNOSIS — F51.02 ADJUSTMENT INSOMNIA: ICD-10-CM

## 2023-07-13 DIAGNOSIS — R65.21 SEPTIC SHOCK (HCC): ICD-10-CM

## 2023-07-13 PROCEDURE — 3078F DIAST BP <80 MM HG: CPT | Performed by: NURSE PRACTITIONER

## 2023-07-13 PROCEDURE — 1036F TOBACCO NON-USER: CPT | Performed by: NURSE PRACTITIONER

## 2023-07-13 PROCEDURE — G8427 DOCREV CUR MEDS BY ELIG CLIN: HCPCS | Performed by: NURSE PRACTITIONER

## 2023-07-13 PROCEDURE — G8417 CALC BMI ABV UP PARAM F/U: HCPCS | Performed by: NURSE PRACTITIONER

## 2023-07-13 PROCEDURE — 3074F SYST BP LT 130 MM HG: CPT | Performed by: NURSE PRACTITIONER

## 2023-07-13 PROCEDURE — 99213 OFFICE O/P EST LOW 20 MIN: CPT | Performed by: NURSE PRACTITIONER

## 2023-07-13 PROCEDURE — 1111F DSCHRG MED/CURRENT MED MERGE: CPT | Performed by: NURSE PRACTITIONER

## 2023-07-13 PROCEDURE — 1123F ACP DISCUSS/DSCN MKR DOCD: CPT | Performed by: NURSE PRACTITIONER

## 2023-07-13 PROCEDURE — 3017F COLORECTAL CA SCREEN DOC REV: CPT | Performed by: NURSE PRACTITIONER

## 2023-07-13 ASSESSMENT — ENCOUNTER SYMPTOMS
BLOOD IN STOOL: 0
SHORTNESS OF BREATH: 0
CONSTIPATION: 0
COUGH: 0
ABDOMINAL PAIN: 0
VOMITING: 0
CHEST TIGHTNESS: 0
DIARRHEA: 0
NAUSEA: 0

## 2023-07-13 NOTE — PROGRESS NOTES
Visit Information    Have you changed or started any medications since your last visit including any over-the-counter medicines, vitamins, or herbal medicines? no   Have you stopped taking any of your medications? Is so, why? -  no  Are you having any side effects from any of your medications? - no    Have you seen any other physician or provider since your last visit?  no   Have you had any other diagnostic tests since your last visit?  no   Have you been seen in the emergency room and/or had an admission in a hospital since we last saw you?  no   Have you had your routine dental cleaning in the past 6 months?  no     Do you have an active MyChart account? If no, what is the barrier?   Yes    Patient Care Team:  TORREY Allen CNP as PCP - General (Family Nurse Practitioner)  TORREY Allen CNP as PCP - Empaneled Provider  Edson Olvera MD as Consulting Physician (Gastroenterology)    Medical History Review  Past Medical, Family, and Social History reviewed and  contribute to the patient presenting condition    Health Maintenance   Topic Date Due    COVID-19 Vaccine (1) Never done    Diabetic retinal exam  Never done    DTaP/Tdap/Td vaccine (1 - Tdap) Never done    Diabetic foot exam  02/01/2022    Shingles vaccine (1 of 2) 05/10/2024 (Originally 1/13/1979)    Flu vaccine (1) 08/01/2023    Diabetic Alb to Cr ratio (uACR) test  12/19/2023    Lipids  12/19/2023    A1C test (Diabetic or Prediabetic)  04/18/2024    Depression Screen  05/10/2024    GFR test (Diabetes, CKD 3-4, OR last GFR 15-59)  06/26/2024    Colorectal Cancer Screen  04/18/2033    Pneumococcal 65+ years Vaccine  Completed    Hepatitis C screen  Completed    HIV screen  Completed    Hepatitis A vaccine  Aged Out    Hib vaccine  Aged Out    Meningococcal (ACWY) vaccine  Aged Out    Pneumococcal 0-64 years Vaccine  Discontinued    Prostate Specific Antigen (PSA) Screening or Monitoring  Discontinued
The patient is not nervous/anxious. Objective:      Physical Exam  Vitals and nursing note reviewed. Exam conducted with a chaperone present. Constitutional:       General: He is not in acute distress. Appearance: Normal appearance. He is well-developed. HENT:      Head: Normocephalic and atraumatic. Eyes:      Conjunctiva/sclera: Conjunctivae normal.   Cardiovascular:      Rate and Rhythm: Normal rate and regular rhythm. Pulses: Normal pulses. Heart sounds: Normal heart sounds. No murmur heard. Comments: No LE edema  Pulmonary:      Effort: Pulmonary effort is normal.      Breath sounds: Normal breath sounds. Abdominal:      Hernia: A hernia is present. Musculoskeletal:      Cervical back: Neck supple. Skin:     General: Skin is warm and dry. Neurological:      General: No focal deficit present. Mental Status: He is alert and oriented to person, place, and time. Mental status is at baseline. Motor: No weakness. Gait: Gait normal.   Psychiatric:         Mood and Affect: Mood normal.         Behavior: Behavior normal.         Thought Content: Thought content normal.         Judgment: Judgment normal.       Assessment:       Diagnosis Orders   1. Upper GI bleed  Basic Metabolic Panel    CBC      2.  Septic shock [facility])  Basic Metabolic Panel    CBC        Lab Results   Component Value Date    WBC 10.5 06/26/2023    HGB 8.5 (L) 06/26/2023    HCT 26.8 (L) 06/26/2023    MCV 92.6 06/26/2023     06/26/2023       Chemistry        Component Value Date/Time     06/26/2023 0421    K 4.4 06/26/2023 0421     06/26/2023 0421    CO2 19 (L) 06/26/2023 0421    BUN 28 (H) 06/26/2023 0421    CREATININE 1.82 (H) 06/26/2023 0421        Component Value Date/Time    CALCIUM 8.3 (L) 06/26/2023 0421    ALKPHOS 63 06/21/2023 1120    AST 11 06/21/2023 1120    ALT 16 06/21/2023 1120    BILITOT 0.2 (L) 06/21/2023 1120          Plan:      Return if symptoms worsen or fail to

## 2023-07-14 RX ORDER — ZALEPLON 10 MG/1
10 CAPSULE ORAL NIGHTLY
Qty: 15 CAPSULE | Refills: 0 | Status: SHIPPED | OUTPATIENT
Start: 2023-07-14 | End: 2023-08-13

## 2023-07-18 ENCOUNTER — HOSPITAL ENCOUNTER (OUTPATIENT)
Dept: PHARMACY | Age: 63
Setting detail: THERAPIES SERIES
Discharge: HOME | End: 2023-07-18
Payer: COMMERCIAL

## 2023-07-18 VITALS
OXYGEN SATURATION: 100 % | WEIGHT: 264.9 LBS | SYSTOLIC BLOOD PRESSURE: 130 MMHG | TEMPERATURE: 98.4 F | BODY MASS INDEX: 36.95 KG/M2 | DIASTOLIC BLOOD PRESSURE: 79 MMHG | HEART RATE: 67 BPM

## 2023-07-18 DIAGNOSIS — F51.02 ADJUSTMENT INSOMNIA: ICD-10-CM

## 2023-07-18 LAB — HBA1C MFR BLD: 5.2 %

## 2023-07-18 PROCEDURE — 99212 OFFICE O/P EST SF 10 MIN: CPT

## 2023-07-18 PROCEDURE — 83036 HEMOGLOBIN GLYCOSYLATED A1C: CPT

## 2023-07-18 RX ORDER — ZALEPLON 10 MG/1
10 CAPSULE ORAL NIGHTLY
Qty: 15 CAPSULE | OUTPATIENT
Start: 2023-07-18 | End: 2023-08-17

## 2023-07-18 NOTE — PROGRESS NOTES
of blood transfusion     NO REACTION    [place] (hard of hearing)     HTN     Jenny Jurado CNP    Hyperkalemia     Hyperlipidemia     Incisional hernia     Kidney infection     Sepsis ([address])     post surgery. leak in bowel    Sleep apnea     USES CPAP MACHINE    T2DM     K. Paula Reyes CNP. diet controlled    Tooth missing     RIGHT UPPER 2ND MOLAR    Under care of team 08/2021    Dr. Pauly Jaeger [facility] [address]    Wears glasses     READING    Wellness examination 07/2021    Martir Fernandezor and Lexi Barroso      Social History:    Social History     Tobacco Use    Smoking status: Never    Smokeless tobacco: Former     Types: Chew     Quit date: 1980   Substance Use Topics    Alcohol use: Yes     Alcohol/week: 2.0 standard drinks     Types: 2 Cans of beer per week     Comment: socially      Pertinent Labs:    Lab Results   Component Value Date    LABA1C 6.8 (H) 04/18/2023    LABA1C 10.0 02/17/2023    LABA1C 10.5 11/15/2022     Lab Results   Component Value Date    CHOL 197 12/19/2022    TRIG 495 (H) 12/19/2022    HDL 31 (L) 12/19/2022     Lab Results   Component Value Date    CREATININE 1.82 (H) 06/26/2023    BUN 28 (H) 06/26/2023     06/26/2023    K 4.4 06/26/2023     06/26/2023    CO2 19 (L) 06/26/2023     Lab Results   Component Value Date/Time    ALT 16 06/21/2023 11:20 AM     Weight:  Wt Readings from Last 3 Encounters:   07/18/23 264 lb 14.4 oz (120.2 kg)   07/13/23 264 lb 6.4 oz (119.9 kg)   06/25/23 260 lb (117.9 kg)     Blood Pressure:  BP Readings from Last 3 Encounters:   07/18/23 130/79   07/13/23 114/62   06/26/23 139/77     Current medications:    Current Outpatient Medications:     zaleplon (SONATA) 10 MG capsule, TAKE 1 CAPSULE BY MOUTH NIGHTLY FOR 30 DAYS.  MAX DAILY AMOUNT: 10 MG, Disp: 15 capsule, Rfl: 0    ferrous sulfate (IRON 325) 325 (65 Fe) MG tablet, Take 1 tablet by mouth in the morning and at bedtime, Disp: 180 tablet, Rfl: 1    pantoprazole (PROTONIX) 40 MG tablet, Take 1 tablet by mouth 2

## 2023-08-09 ENCOUNTER — ANESTHESIA (OUTPATIENT)
Dept: OPERATING ROOM | Age: 63
End: 2023-08-09
Payer: COMMERCIAL

## 2023-08-09 ENCOUNTER — ANESTHESIA EVENT (OUTPATIENT)
Dept: OPERATING ROOM | Age: 63
End: 2023-08-09
Payer: COMMERCIAL

## 2023-08-09 ENCOUNTER — HOSPITAL ENCOUNTER (OUTPATIENT)
Age: 63
Setting detail: OUTPATIENT SURGERY
Discharge: HOME | End: 2023-08-09
Attending: INTERNAL MEDICINE | Admitting: INTERNAL MEDICINE
Payer: COMMERCIAL

## 2023-08-09 VITALS
HEART RATE: 62 BPM | HEIGHT: 71 IN | BODY MASS INDEX: 36.34 KG/M2 | TEMPERATURE: 97.7 F | RESPIRATION RATE: 21 BRPM | WEIGHT: 259.6 LBS | OXYGEN SATURATION: 100 % | DIASTOLIC BLOOD PRESSURE: 79 MMHG | SYSTOLIC BLOOD PRESSURE: 135 MMHG

## 2023-08-09 DIAGNOSIS — K20.90 ESOPHAGITIS: ICD-10-CM

## 2023-08-09 LAB
METER GLUCOSE: 96 MG/DL (ref 75–110)
METER GLUCOSE: 99 MG/DL (ref 75–110)

## 2023-08-09 PROCEDURE — 88305 TISSUE EXAM BY PATHOLOGIST: CPT

## 2023-08-09 PROCEDURE — 82947 ASSAY GLUCOSE BLOOD QUANT: CPT

## 2023-08-09 PROCEDURE — 3700000000 HC ANESTHESIA ATTENDED CARE: Performed by: INTERNAL MEDICINE

## 2023-08-09 PROCEDURE — 7100000010 HC PHASE II RECOVERY - FIRST 15 MIN: Performed by: INTERNAL MEDICINE

## 2023-08-09 PROCEDURE — 6360000002 HC RX W HCPCS: Performed by: NURSE ANESTHETIST, CERTIFIED REGISTERED

## 2023-08-09 PROCEDURE — 3609012400 HC EGD TRANSORAL BIOPSY SINGLE/MULTIPLE: Performed by: INTERNAL MEDICINE

## 2023-08-09 PROCEDURE — 2580000003 HC RX 258: Performed by: ANESTHESIOLOGY

## 2023-08-09 PROCEDURE — 3700000001 HC ADD 15 MINUTES (ANESTHESIA): Performed by: INTERNAL MEDICINE

## 2023-08-09 PROCEDURE — 7100000011 HC PHASE II RECOVERY - ADDTL 15 MIN: Performed by: INTERNAL MEDICINE

## 2023-08-09 PROCEDURE — 2709999900 HC NON-CHARGEABLE SUPPLY: Performed by: INTERNAL MEDICINE

## 2023-08-09 PROCEDURE — 2500000003 HC RX 250 WO HCPCS: Performed by: NURSE ANESTHETIST, CERTIFIED REGISTERED

## 2023-08-09 RX ORDER — LIDOCAINE HYDROCHLORIDE 20 MG/ML
INJECTION, SOLUTION EPIDURAL; INFILTRATION; INTRACAUDAL; PERINEURAL PRN
Status: DISCONTINUED | OUTPATIENT
Start: 2023-08-09 | End: 2023-08-09 | Stop reason: SDUPTHER

## 2023-08-09 RX ORDER — LIDOCAINE HYDROCHLORIDE 10 MG/ML
5 INJECTION, SOLUTION INFILTRATION; PERINEURAL ONCE
Status: DISCONTINUED | OUTPATIENT
Start: 2023-08-09 | End: 2023-08-09 | Stop reason: HOSPADM

## 2023-08-09 RX ORDER — PROPOFOL 10 MG/ML
INJECTION, EMULSION INTRAVENOUS PRN
Status: DISCONTINUED | OUTPATIENT
Start: 2023-08-09 | End: 2023-08-09 | Stop reason: SDUPTHER

## 2023-08-09 RX ORDER — ONDANSETRON 2 MG/ML
4 INJECTION INTRAMUSCULAR; INTRAVENOUS
Status: DISCONTINUED | OUTPATIENT
Start: 2023-08-09 | End: 2023-08-09 | Stop reason: HOSPADM

## 2023-08-09 RX ORDER — SODIUM CHLORIDE, SODIUM LACTATE, POTASSIUM CHLORIDE, CALCIUM CHLORIDE 600; 310; 30; 20 MG/100ML; MG/100ML; MG/100ML; MG/100ML
INJECTION, SOLUTION INTRAVENOUS CONTINUOUS
Status: DISCONTINUED | OUTPATIENT
Start: 2023-08-09 | End: 2023-08-09 | Stop reason: HOSPADM

## 2023-08-09 RX ADMIN — LIDOCAINE HYDROCHLORIDE 100 MG: 20 INJECTION, SOLUTION EPIDURAL; INFILTRATION; INTRACAUDAL; PERINEURAL at 09:50

## 2023-08-09 RX ADMIN — PROPOFOL 150 MG: 10 INJECTION, EMULSION INTRAVENOUS at 09:52

## 2023-08-09 RX ADMIN — SODIUM CHLORIDE, POTASSIUM CHLORIDE, SODIUM LACTATE AND CALCIUM CHLORIDE: 600; 310; 30; 20 INJECTION, SOLUTION INTRAVENOUS at 08:31

## 2023-08-09 RX ADMIN — PROPOFOL 100 MG: 10 INJECTION, EMULSION INTRAVENOUS at 09:58

## 2023-08-09 ASSESSMENT — PAIN - FUNCTIONAL ASSESSMENT: PAIN_FUNCTIONAL_ASSESSMENT: 0-10

## 2023-08-09 ASSESSMENT — ENCOUNTER SYMPTOMS: SHORTNESS OF BREATH: 0

## 2023-08-09 NOTE — DISCHARGE INSTRUCTIONS
[facility]    POST-ENDOSCOPY INSTRUCTIONS    1. ACTIVITY     No driving, operating machinery, or making important decisions for 24 hours. Resume normal activity after 24 hours. You may return to work after 24 hours. 2. DIET      Resume your usual diet unless specified. Diet Modification: none    3. MEDICATIONS       (Do not consume alcohol, tranquilizers, or sleeping medications for 24 hours unless advised by your physician)                 Resume your usual medications. 4. PHYSICIAN FOLLOW-UP                 Please call the office, at [phone], for:  pathology results / appointment / instructions. See your primary care physician as planned. .      5. NORMAL CHANGES YOU MAY EXPERIENCE AFTER ENDOSCOPY:       UPPER ENDOSCOPY (EGD or ERCP): Sore throat. 6. CALL YOUR PHYSICIAN IF YOU EXPERIENCE ANY OF THE FOLLOWING      A. Passing blood rectally or vomiting blood (color may be red or black)      B. Severe abdominal pain or tenderness (that is not relieved by passing air)      C.   Fever, chills, or excessive sweating      D.  Persistent nausea or vomiting      E.  Redness or swelling at the IV site    If you have additional questions, PLEASE call your doctor or the [address] [facility] ([phone])

## 2023-08-09 NOTE — OP NOTE
[id number] [facility] Endoscopy  EGD    Patient: Nichole Ballard II            Date:   2023  : 1960       Med Rec#: [id number]       PROCEDURE:  EGD with biopsy    INDICATION: History of GI bleeding, follow-up of esophagitis    FINDINGS  Mild presbyesophagus. Irregular Z-line, biopsied. A very small hiatal hernia was noted on retroflexion. 2 recently placed endoclips in body of stomach. Stomach otherwise normal.  Duodenal stricture, moderate. Biopsied. PLAN  Await pathology results  Continue pantoprazole 40 mg once daily indefinitely     MEDICATIONS:  MAC   ESTIMATED BLOOD LOSS:  minimal  Specimen(s) Removed:  as above  COMPLICATIONS:   Prior to the procedure, the patient's history was reviewed and a directed physical examination was performed. Informed consent was obtained. After adequate sedation was achieved, the scope was inserted into the mouth and advanced to the second portion of the duodenum. As the scope was withdrawn, the anatomy and the appearance of the mucosa was noted.        Phil Ying M.D.

## 2023-08-09 NOTE — ANESTHESIA POSTPROCEDURE EVALUATION
Department of Anesthesiology  Postprocedure Note    Patient: Jocelyn Whitney  MRN: [id number]  YOB: 1960  Date of evaluation: 8/9/2023      Procedure Summary     Date: 08/09/23 Room / Location: [place] [address] / [facility] - INPATIENT    Anesthesia Start: 0945 Anesthesia Stop: 1010    Procedure: EGD BIOPSY (Mouth) Diagnosis:       Esophagitis      (Esophagitis [K20.90])    Surgeons: Farzana Manzano MD Responsible Provider: Kevin Shanks MD    Anesthesia Type: general ASA Status: 3          Anesthesia Type: No value filed.     Destiny Phase I:      Destiny Phase II: Destiny Score: 10      Anesthesia Post Evaluation    Complications: no negative

## 2023-08-09 NOTE — H&P
GASTROENTEROLOGY  CONSULTATION    Patient: Quinn Melarat II   : 1960  Med Rec#: [id number]          HISTORY OF PRESENT ILLNESS:     This is a 70 y.o. White male who presents with lower GI symptoms. He has a recent history of significant massive upper GI bleeding. The etiology is felt to be a gastric villiform. This was treated at the time. Also noted with severe ulcerative esophagitis. .  No anorexia or weight loss. No pyrosis or regurgitation. No early satiety or post prandial bloating. No dysphagia or odynophagia. No nausea or vomiting. No hematemesis or coffee ground emesis. No other abdominal pain. No diarrhea or constipation. No melena or hematochezia. No fecal incontinence. Diamond Hamman PAST MEDICAL HISTORY:  Past Medical History:   Diagnosis Date    Arthritis     BILATERAL Oletha Egan right shoulder    Back pain     Colostomy RUQ 2020    Frequent headaches     10/28/2020 PATIENT STATES EVERY OTHER DAY. Gout     Hernia of abdominal wall     History of atrial fibrillation     AFTER SURGERY ON 2020 WENT INTO A FIB. CONVERTED BACK TO NORMAL RHYTHM ON HIS OWN. CARDIOLOGIST DR Anshul Delgado    History of blood transfusion     NO REACTION    [place] (hard of hearing)     HTN     Dannielle Lombard, CNP    Hyperkalemia     Hyperlipidemia     Incisional hernia     Kidney infection     Sepsis ([address])     post surgery. leak in bowel    Sleep apnea     USES CPAP MACHINE    T2DM     K. Alejo Lovell CNP.  diet controlled    Tooth missing     RIGHT UPPER 2ND MOLAR    Under care of team 2021    Dr. Krishna Rivas [facility] [address]    Wears glasses     READING    Wellness examination 2021    Jaun Kramer and Kenneth Sin      Past Surgical History:   Procedure Laterality Date    ADENOIDECTOMY      TWICE AS A CHILD    ANKLE SURGERY Right     RUPTURED ACHILES    CARPAL TUNNEL RELEASE Bilateral     COLONOSCOPY  2023    diagnostic    COLONOSCOPY N/A 2023    COLONOSCOPY DIAGNOSTIC performed by Ralph Berrios

## 2023-08-09 NOTE — ANESTHESIA PRE PROCEDURE
Department of Anesthesiology  Preprocedure Note       Name:  Luther Goode II   Age:  70 y.o.  :  1960                                          MRN:  [id number]         Date:  2023      Surgeon: Bessie Duran):  Reena Harding MD    Procedure: Procedure(s):  EGD ESOPHAGOGASTRODUODENOSCOPY    Medications prior to admission:   Prior to Admission medications    Medication Sig Start Date End Date Taking? Authorizing Provider   zaleplon (SONATA) 10 MG capsule TAKE 1 CAPSULE BY MOUTH NIGHTLY FOR 30 DAYS.  MAX DAILY AMOUNT: 10 MG 23  TORREY Cristina CNP   ferrous sulfate (IRON 325) 325 (65 Fe) MG tablet Take 1 tablet by mouth in the morning and at bedtime 23   Pardeep Stern MD   pantoprazole (PROTONIX) 40 MG tablet Take 1 tablet by mouth 2 times daily (before meals) 23   Pardeep Stern MD   Insulin Pen Needle ([employer] PEN NEEDLES 31G) 31G X 8 MM MISC Use to inject insulin up to 4 times daily 23   TORREY Cristina CNP   simethicone (MYLICON) 80 MG chewable tablet Take 1 tablet by mouth every 6 hours as needed for Flatulence    Historical Provider, MD   Continuous Blood Gluc Sensor (FREESTYLE RANJIT 14 DAY SENSOR) MISC 1 EACH BY DOES NOT APPLY ROUTE CONTINUOUS 23   TORREY Cristina CNP   pravastatin (PRAVACHOL) 20 MG tablet TAKE 1 TABLET BY MOUTH EVERY DAY 23   TORREY Cristina CNP   insulin glargine (LANTUS SOLOSTAR) 100 UNIT/ML injection pen Inject 20-30 units of insulin subcutaneously once daily as directed  Patient taking differently: 20 Units nightly Inject 20-30 units of insulin subcutaneously once daily as directed 23   TORREY Cristina CNP   vitamin D (ERGOCALCIFEROL) 1.25 MG (55138 UT) CAPS capsule TAKE 1 CAPSULE BY MOUTH MONTHLY FOR 6 MONTHS 90 23   Historical Provider, MD   cyclobenzaprine (FLEXERIL) 10 MG tablet TAKE 1 TABLET BY MOUTH 2 TIMES DAILY AS NEEDED FOR MUSCLE SPASMS. 3/7/23   TORREY Cristina - CNP

## 2023-08-10 LAB — SURGICAL PATHOLOGY REPORT: NORMAL

## 2023-10-24 RX ORDER — INSULIN GLARGINE 100 [IU]/ML
INJECTION, SOLUTION SUBCUTANEOUS
Qty: 5 ADJUSTABLE DOSE PRE-FILLED PEN SYRINGE | Refills: 1 | Status: SHIPPED | OUTPATIENT
Start: 2023-10-24

## 2023-10-30 RX ORDER — CYCLOBENZAPRINE HCL 10 MG
10 TABLET ORAL 2 TIMES DAILY PRN
Qty: 180 TABLET | Refills: 1 | Status: SHIPPED | OUTPATIENT
Start: 2023-10-30

## 2023-11-01 ENCOUNTER — HOSPITAL ENCOUNTER (OUTPATIENT)
Dept: PHARMACY | Age: 63
Setting detail: THERAPIES SERIES
Discharge: HOME | End: 2023-11-01
Payer: COMMERCIAL

## 2023-11-01 VITALS
HEART RATE: 72 BPM | TEMPERATURE: 97.8 F | SYSTOLIC BLOOD PRESSURE: 107 MMHG | OXYGEN SATURATION: 99 % | DIASTOLIC BLOOD PRESSURE: 71 MMHG | WEIGHT: 272.6 LBS | BODY MASS INDEX: 38.02 KG/M2

## 2023-11-01 LAB — HBA1C MFR BLD: 7.4 %

## 2023-11-01 PROCEDURE — 99213 OFFICE O/P EST LOW 20 MIN: CPT

## 2023-11-01 PROCEDURE — 83036 HEMOGLOBIN GLYCOSYLATED A1C: CPT

## 2023-11-01 NOTE — PROGRESS NOTES
Diabetic Medication Management Program  [facility], [address]  Phone: [phone]  Fax: [phone]    NAME: Barbra Gar II  MEDICAL RECORD NUMBER:  [id number]  AGE: 70 y.o. GENDER: male  : 1960  EPISODE DATE:  2023     Mr. Denton was referred to [facility] Medication Management Services by Tonny Moreno CNP. Patient acknowledges working in consult agreement with clinical pharmacist and this provider. Goals per referral:   Fasting blood glucose: < 130   Peak postprandial glucose: < 180  A1C: < 7    SUBJECTIVE     Mr. Pernell Rowe is a 70 y.o. male here for the Diabetes Service for self-management education, medication review including over the counter medications and herbal products, overall wellbeing assessment, transition of care and any needed adjustments with updates and recommendations communicated to the referring physician. Patient Findings:   Medication changes  no  Diet changes  Yes: vacation to [facility] and work anniversday celebration  Activity changes  Yes: while off work for 44 days exercise was much less than usual  Emergency Room Visit or Hospitalization  no  Acute Illness/new problems  no  Symptoms of hypoglycemia  no - none  Symptoms of hyperglycemia  no - none  Medication adverse reactions  none    Comments: Patient had GI f/u since  hospitalized who instructed patient to discontinue Aspirin. Patient also notes that since he was off work ([employer] strike) he did not follow his typical routine for diet and exercise. In addition he had 2 celebrations that caused altered dietary intake. OBJECTIVE     PMHx:    Past Medical History:   Diagnosis Date    Arthritis     BILATERAL Arn Laming right shoulder    Back pain     Colostomy RUQ 2020    Frequent headaches     10/28/2020 PATIENT STATES EVERY OTHER DAY.     Gout     Hernia of abdominal wall     History of atrial fibrillation     AFTER SURGERY ON 2020 WENT

## 2023-11-30 ENCOUNTER — APPOINTMENT (OUTPATIENT)
Dept: GENERAL RADIOLOGY | Age: 63
End: 2023-11-30
Payer: COMMERCIAL

## 2023-11-30 ENCOUNTER — HOSPITAL ENCOUNTER (EMERGENCY)
Age: 63
Discharge: HOME | End: 2023-11-30
Attending: EMERGENCY MEDICINE
Payer: COMMERCIAL

## 2023-11-30 VITALS
SYSTOLIC BLOOD PRESSURE: 155 MMHG | DIASTOLIC BLOOD PRESSURE: 100 MMHG | HEIGHT: 71 IN | HEART RATE: 73 BPM | WEIGHT: 272 LBS | BODY MASS INDEX: 38.08 KG/M2 | TEMPERATURE: 97.5 F | RESPIRATION RATE: 16 BRPM | OXYGEN SATURATION: 99 %

## 2023-11-30 DIAGNOSIS — M54.31 SCIATICA OF RIGHT SIDE: Primary | ICD-10-CM

## 2023-11-30 PROCEDURE — 99283 EMERGENCY DEPT VISIT LOW MDM: CPT

## 2023-11-30 PROCEDURE — 73502 X-RAY EXAM HIP UNI 2-3 VIEWS: CPT

## 2023-11-30 RX ORDER — PREDNISONE 20 MG/1
40 TABLET ORAL DAILY
Qty: 6 TABLET | Refills: 0 | Status: SHIPPED | OUTPATIENT
Start: 2023-11-30 | End: 2023-12-03

## 2023-11-30 RX ORDER — HYDROCODONE BITARTRATE AND ACETAMINOPHEN 5; 325 MG/1; MG/1
1 TABLET ORAL EVERY 6 HOURS PRN
Qty: 10 TABLET | Refills: 0 | Status: SHIPPED | OUTPATIENT
Start: 2023-11-30 | End: 2023-12-03

## 2023-11-30 ASSESSMENT — PAIN DESCRIPTION - ORIENTATION: ORIENTATION: RIGHT;LEFT

## 2023-11-30 ASSESSMENT — PAIN SCALES - GENERAL: PAINLEVEL_OUTOF10: 5

## 2023-11-30 ASSESSMENT — PAIN DESCRIPTION - LOCATION: LOCATION: HIP

## 2023-11-30 ASSESSMENT — PAIN - FUNCTIONAL ASSESSMENT: PAIN_FUNCTIONAL_ASSESSMENT: 0-10

## 2023-11-30 ASSESSMENT — ENCOUNTER SYMPTOMS: BACK PAIN: 0

## 2023-11-30 ASSESSMENT — PAIN DESCRIPTION - DESCRIPTORS: DESCRIPTORS: ACHING

## 2023-11-30 NOTE — ED PROVIDER NOTES
[address] [facility] ED  eMERGENCY dEPARTMENT eNCOUnter      Pt Name: Jaimie Remy  MRN: [id number]  [address] [facility] [address] 1960  Date of evaluation: 11/30/2023  Provider: Usha Bhatia, APRN - [facility]       Chief Complaint   Patient presents with    Hip Pain     Pt states since Sunday he has been having Rt hip pain, 9/10         HISTORY OF PRESENT ILLNESS  (Location/Symptom, Timing/Onset, Context/Setting, Quality, Duration, Modifying Factors, Severity.)   Constantine Escobar II is a 70 y.o. male who presents to the emergency department for evaluation of right hip pain and buttock pain that started several days ago. Denies recent fall or injury pain is aggravated with certain movement. Pain 5 out of 10. Denies numbness tingling in his leg. No loss of bowel or bladder control. No abdominal pain, dysuria or hematuria. No fever. Nursing Notes were reviewed.     ALLERGIES     Ampicillin, Pcn [penicillins], Sulfa antibiotics, and Tape [adhesive tape]    CURRENT MEDICATIONS       Discharge Medication List as of 11/30/2023  5:34 PM        CONTINUE these medications which have NOT CHANGED    Details   cyclobenzaprine (FLEXERIL) 10 MG tablet TAKE 1 TABLET BY MOUTH 2 TIMES DAILY AS NEEDED FOR MUSCLE SPASMS., Disp-180 tablet, R-1Normal      insulin glargine (LANTUS SOLOSTAR) 100 UNIT/ML injection pen Inject 20-30 units of insulin subcutaneously once daily as directed, Disp-5 Adjustable Dose Pre-filled Pen Syringe, R-1Normal      ferrous sulfate (IRON 325) 325 (65 Fe) MG tablet Take 1 tablet by mouth in the morning and at bedtime, Disp-180 tablet, R-1Normal      pantoprazole (PROTONIX) 40 MG tablet Take 1 tablet by mouth 2 times daily (before meals), Disp-30 tablet, R-3Normal      Insulin Pen Needle ([employer] PEN NEEDLES 31G) 31G X 8 MM MISC Disp-300 each, R-1, NormalUse to inject insulin up to 4 times daily      simethicone (MYLICON) 80 MG chewable tablet Take 1 tablet by mouth every 6 hours as needed for

## 2023-11-30 NOTE — ED NOTES
Pt arrives to the ED for c/o bilateral hip pain  Pt denies any injury or trauma to the hips  Pt states that he worked and then the hip pain started  RR is even and non-labored      Sallee Leyden, RN  11/30/23 1900

## 2023-11-30 NOTE — DISCHARGE INSTRUCTIONS
Take medication as prescribed. Norco can cause drowsiness. Follow-up with your primary care provider. Return to emergency department for worsening or new symptoms.

## 2023-11-30 NOTE — ED PROVIDER NOTES
eMERGENCY dEPARTMENT eNCOUnter   Independent Attestation     Pt Name: Jocelyn Whitney  MRN: [id number]  [address] [facility] 1960  Date of evaluation: 11/30/23     Molly Watts II is [facility] 73 [facility] male with CC: Hip Pain (Pt states since Sunday he has been having Rt hip pain, 9/10)        This visit was performed by both a physician and an APC. I performed all aspects of the MDM as documented.       Kvng Berman MD  Attending Emergency Physician            Kvng Berman MD  08/11/23 5933

## 2023-12-02 DIAGNOSIS — E78.1 HYPERTRIGLYCERIDEMIA: ICD-10-CM

## 2023-12-03 RX ORDER — PRAVASTATIN SODIUM 20 MG
TABLET ORAL
Qty: 90 TABLET | Refills: 1 | Status: SHIPPED | OUTPATIENT
Start: 2023-12-03

## 2023-12-05 ENCOUNTER — HOSPITAL ENCOUNTER (OUTPATIENT)
Dept: PHARMACY | Age: 63
Setting detail: THERAPIES SERIES
Discharge: HOME | End: 2023-12-05
Payer: COMMERCIAL

## 2023-12-05 VITALS
WEIGHT: 274.2 LBS | BODY MASS INDEX: 38.24 KG/M2 | OXYGEN SATURATION: 100 % | DIASTOLIC BLOOD PRESSURE: 88 MMHG | SYSTOLIC BLOOD PRESSURE: 151 MMHG | HEART RATE: 71 BPM

## 2023-12-05 PROCEDURE — 99213 OFFICE O/P EST LOW 20 MIN: CPT

## 2023-12-05 RX ORDER — BLOOD-GLUCOSE SENSOR
EACH MISCELLANEOUS
Qty: 2 EACH | Refills: 5 | Status: SHIPPED | OUTPATIENT
Start: 2023-12-05

## 2023-12-05 RX ORDER — LISINOPRIL 5 MG/1
5 TABLET ORAL DAILY
Qty: 90 TABLET | Refills: 0 | Status: SHIPPED | OUTPATIENT
Start: 2023-12-05

## 2023-12-05 NOTE — PROGRESS NOTES
Diabetic Medication Management Program  [facility]. [address] [address]  Phone: [phone]  Fax: [phone]    NAME: Guanakito Ramirez II  MEDICAL RECORD NUMBER:  [id number]  AGE: 70 y.o. GENDER: male  : 1960  EPISODE DATE:  2023     Mr. Denton was referred to [facility] Medication Management Services by Lizbeth Serrano CNP. Patient acknowledges working in consult agreement with clinical pharmacist and this provider. Goals per referral:   Fasting blood glucose: < 130  Peak postprandial glucose: < 180  A1C: < 7    SUBJECTIVE     Mr. Petar Langston is a 70 y.o. male here for the Diabetes Service for self-management education, medication review including over the counter medications and herbal products, overall wellbeing assessment, transition of care and any needed adjustments with updates and recommendations communicated to the referring physician. Patient Findings:     Medication changes  no  Diet changes  Yes: didn't eat at all  when sick with vomiting  Activity changes  Yes: Hip pain has kept patient off work and without his usual walks  Emergency Room Visit or Hospitalization  Yes: for hip pain  Acute Illness/new problems  yes - patient was ill for a couple of days with vomiting every hour (possibly from something he ate but possibly viral). He was also treated with PREDNISONE for hip pain which elevated BG. Symptoms of hypoglycemia  no - none  Symptoms of hyperglycemia  no - none  Medication adverse reactions  none    OBJECTIVE     PMHx:    Past Medical History:   Diagnosis Date    Arthritis     BILATERAL KNEESand right shoulder    Back pain     Colostomy RUQ 2020    Frequent headaches     10/28/2020 PATIENT STATES EVERY OTHER DAY. Gout     Hernia of abdominal wall     History of atrial fibrillation     AFTER SURGERY ON 2020 WENT INTO A FIB. CONVERTED BACK TO NORMAL RHYTHM ON HIS OWN.  CARDIOLOGIST DR STEIN    History of blood

## 2023-12-11 DIAGNOSIS — E11.65 UNCONTROLLED TYPE 2 DIABETES MELLITUS WITH HYPERGLYCEMIA (HCC): ICD-10-CM

## 2023-12-11 DIAGNOSIS — E11.9 TYPE 2 DIABETES MELLITUS WITHOUT COMPLICATION, UNSPECIFIED WHETHER LONG TERM INSULIN USE (HCC): ICD-10-CM

## 2023-12-11 RX ORDER — FLASH GLUCOSE SENSOR
KIT MISCELLANEOUS
Refills: 5 | OUTPATIENT
Start: 2023-12-11

## 2023-12-26 RX ORDER — BLOOD-GLUCOSE SENSOR
EACH MISCELLANEOUS
Qty: 2 EACH | Refills: 5 | Status: SHIPPED | OUTPATIENT
Start: 2023-12-26

## 2024-02-07 ENCOUNTER — HOSPITAL ENCOUNTER (OUTPATIENT)
Dept: PHARMACY | Age: 64
Setting detail: THERAPIES SERIES
Discharge: HOME | End: 2024-02-07
Payer: COMMERCIAL

## 2024-02-07 VITALS
TEMPERATURE: 97.5 F | OXYGEN SATURATION: 98 % | WEIGHT: 280.3 LBS | SYSTOLIC BLOOD PRESSURE: 170 MMHG | BODY MASS INDEX: 39.09 KG/M2 | HEART RATE: 79 BPM | DIASTOLIC BLOOD PRESSURE: 92 MMHG

## 2024-02-07 LAB — HBA1C MFR BLD: 6.3 %

## 2024-02-07 PROCEDURE — 83036 HEMOGLOBIN GLYCOSYLATED A1C: CPT

## 2024-02-07 PROCEDURE — 99213 OFFICE O/P EST LOW 20 MIN: CPT

## 2024-02-07 RX ORDER — FLUTICASONE PROPIONATE 50 MCG
2 SPRAY, SUSPENSION (ML) NASAL 2 TIMES DAILY
COMMUNITY
Start: 2024-01-29

## 2024-02-07 RX ORDER — OFLOXACIN 3 MG/ML
4 SOLUTION AURICULAR (OTIC) 2 TIMES DAILY
COMMUNITY
Start: 2024-01-29

## 2024-02-07 RX ORDER — LISINOPRIL 5 MG/1
5 TABLET ORAL DAILY
Qty: 90 TABLET | Refills: 2 | Status: SHIPPED | OUTPATIENT
Start: 2024-02-07

## 2024-02-07 RX ORDER — LISINOPRIL 5 MG/1
5 TABLET ORAL DAILY
COMMUNITY

## 2024-02-07 RX ORDER — AZELASTINE HYDROCHLORIDE 137 UG/1
2 SPRAY, METERED NASAL 2 TIMES DAILY
COMMUNITY
Start: 2024-01-29

## 2024-02-07 NOTE — PROGRESS NOTES
MISC, use as directed, Disp: , Rfl:     Respiratory Therapy Supplies (CARETOUCH CPAP & BIPAP HOSE) MISC, Mask and Tubing, Disp: , Rfl:     Melatonin 10 MG TABS, Take 1 tablet by mouth nightly as needed, Disp: , Rfl:     Multiple Vitamins-Minerals (THERAPEUTIC MULTIVITAMIN-MINERALS) tablet, Take 1 tablet by mouth daily, Disp: 90 tablet, Rfl: 1    vitamin C (ASCORBIC ACID) 500 MG tablet, Take 2 tablets by mouth 3 times daily, Disp: , Rfl:     Immunizations:   Most Recent Immunizations   Administered Date(s) Administered    Pneumococcal, PPSV23, PNEUMOVAX 23, (age 2y+), SC/IM, 0.5mL 12/12/2016     Home Blood Glucose Results: See below        Blood glucose trends noted:  Meeting CGM goals for time in range. Elevations noted from taking excessive dose of Vitamin C two days in a row.    ASSESSMENT     Recent A1c:  6.3 TODAY!    Lab Results   Component Value Date    LABA1C 7.4 11/01/2023    LABA1C 5.2 07/18/2023    LABA1C 6.8 (H) 04/18/2023      Eating patterns: Packing for work - eats most meals there. Has been eating the same as usual. With monitoring CGM he is able to see what choices allow for therapeutic BG.    Exercise patterns: Still doing 5 to 6 times walking at work.     Blood Glucose Testing: Patient is currently using CGM    Current Diabetic Medications: Lantus 25 units daily - will reduce dose in effort to transition to oral agent Jardiance. Will start 10mg daily then increase to 25mg with continued reduction of insulin.      Diabetic Regimen/Compliance: Has missed an occasional Lantus dose. BG this morning was at goal despite missed dose.     Other Medication Compliance: Missed BP medication the last 2 days    Refills needed (diabetes medications/testing supplies). Yes: Jardiance 10mg (new) and Lisinopril 5mg (refill to [facility])    Labs needed (A1c, lipids, microalbumin, SCr etc) Yes: A1c    Up to date with eye/foot exams no: Needs to schedule eye appointment still.    Patient is on appropriate statin, ACE/ARB,

## 2024-02-27 ENCOUNTER — OFFICE VISIT (OUTPATIENT)
Dept: FAMILY MEDICINE CLINIC | Age: 64
End: 2024-02-27
Payer: COMMERCIAL

## 2024-02-27 VITALS
OXYGEN SATURATION: 95 % | DIASTOLIC BLOOD PRESSURE: 80 MMHG | SYSTOLIC BLOOD PRESSURE: 138 MMHG | RESPIRATION RATE: 20 BRPM | HEART RATE: 90 BPM | HEIGHT: 71 IN | WEIGHT: 264 LBS | BODY MASS INDEX: 36.96 KG/M2 | TEMPERATURE: 98.6 F

## 2024-02-27 DIAGNOSIS — J98.01 BRONCHOSPASM: ICD-10-CM

## 2024-02-27 DIAGNOSIS — E66.01 CLASS 2 SEVERE OBESITY DUE TO EXCESS CALORIES WITH SERIOUS COMORBIDITY AND BODY MASS INDEX (BMI) OF 36.0 TO 36.9 IN ADULT (HCC): ICD-10-CM

## 2024-02-27 DIAGNOSIS — R06.02 SOB (SHORTNESS OF BREATH): ICD-10-CM

## 2024-02-27 DIAGNOSIS — J40 BRONCHITIS: Primary | ICD-10-CM

## 2024-02-27 DIAGNOSIS — R05.1 ACUTE COUGH: ICD-10-CM

## 2024-02-27 DIAGNOSIS — R19.5 LOOSE STOOLS: ICD-10-CM

## 2024-02-27 PROBLEM — K92.1 GASTROINTESTINAL HEMORRHAGE WITH MELENA: Status: RESOLVED | Noted: 2022-01-05 | Resolved: 2024-02-27

## 2024-02-27 PROBLEM — K56.609 SBO (SMALL BOWEL OBSTRUCTION) (HCC): Status: RESOLVED | Noted: 2023-04-17 | Resolved: 2024-02-27

## 2024-02-27 PROBLEM — R57.8 HEMORRHAGIC SHOCK (HCC): Status: RESOLVED | Noted: 2023-06-21 | Resolved: 2024-02-27

## 2024-02-27 PROBLEM — K56.609 LARGE BOWEL OBSTRUCTION (HCC): Status: RESOLVED | Noted: 2020-08-31 | Resolved: 2024-02-27

## 2024-02-27 PROBLEM — D62 ACUTE BLOOD LOSS ANEMIA: Status: RESOLVED | Noted: 2022-01-05 | Resolved: 2024-02-27

## 2024-02-27 PROBLEM — K92.2 UPPER GI BLEED: Status: RESOLVED | Noted: 2023-06-21 | Resolved: 2024-02-27

## 2024-02-27 PROCEDURE — 1123F ACP DISCUSS/DSCN MKR DOCD: CPT | Performed by: FAMILY MEDICINE

## 2024-02-27 PROCEDURE — 3079F DIAST BP 80-89 MM HG: CPT | Performed by: FAMILY MEDICINE

## 2024-02-27 PROCEDURE — 99214 OFFICE O/P EST MOD 30 MIN: CPT | Performed by: FAMILY MEDICINE

## 2024-02-27 PROCEDURE — 3075F SYST BP GE 130 - 139MM HG: CPT | Performed by: FAMILY MEDICINE

## 2024-02-27 RX ORDER — BENZONATATE 200 MG/1
200 CAPSULE ORAL 3 TIMES DAILY PRN
Qty: 30 CAPSULE | Refills: 0 | Status: ON HOLD | OUTPATIENT
Start: 2024-02-27 | End: 2024-03-08

## 2024-02-27 RX ORDER — DOXYCYCLINE HYCLATE 100 MG
100 TABLET ORAL 2 TIMES DAILY
Qty: 20 TABLET | Refills: 0 | Status: ON HOLD | OUTPATIENT
Start: 2024-02-27 | End: 2024-03-01

## 2024-02-27 RX ORDER — METHYLPREDNISOLONE 4 MG/1
TABLET ORAL
Qty: 21 TABLET | Refills: 0 | Status: ON HOLD | OUTPATIENT
Start: 2024-02-27 | End: 2024-03-01

## 2024-02-27 RX ORDER — ALBUTEROL SULFATE 90 UG/1
2 AEROSOL, METERED RESPIRATORY (INHALATION) 4 TIMES DAILY PRN
Qty: 18 G | Refills: 0 | Status: ON HOLD | OUTPATIENT
Start: 2024-02-27

## 2024-02-27 ASSESSMENT — PATIENT HEALTH QUESTIONNAIRE - PHQ9
SUM OF ALL RESPONSES TO PHQ QUESTIONS 1-9: 0
2. FEELING DOWN, DEPRESSED OR HOPELESS: 0
SUM OF ALL RESPONSES TO PHQ QUESTIONS 1-9: 0
SUM OF ALL RESPONSES TO PHQ9 QUESTIONS 1 & 2: 0
SUM OF ALL RESPONSES TO PHQ QUESTIONS 1-9: 0
SUM OF ALL RESPONSES TO PHQ QUESTIONS 1-9: 0
1. LITTLE INTEREST OR PLEASURE IN DOING THINGS: 0

## 2024-02-27 ASSESSMENT — ENCOUNTER SYMPTOMS
SWOLLEN GLANDS: 1
ABDOMINAL PAIN: 0
VOMITING: 0
SHORTNESS OF BREATH: 1
COUGH: 1
ALLERGIC/IMMUNOLOGIC NEGATIVE: 1
RHINORRHEA: 1
DIARRHEA: 1
WHEEZING: 1
EYES NEGATIVE: 1
NAUSEA: 1

## 2024-02-27 NOTE — PROGRESS NOTES
Pneumococcal 0-64 years Vaccine  Discontinued    HIV screen  Discontinued    Prostate Specific Antigen (PSA) Screening or Monitoring  Discontinued     
General: Skin is warm and dry.      Capillary Refill: Capillary refill takes less than 2 seconds.      Findings: No rash.   Neurological:      General: No focal deficit present.      Mental Status: He is alert and oriented to person, place, and time.      Cranial Nerves: No cranial nerve deficit.      Motor: No weakness.      Coordination: Coordination normal.      Gait: Gait normal.   Psychiatric:         Mood and Affect: Mood normal.         Behavior: Behavior normal.       ASSESSMENT/PLAN:     1. Bronchitis  -     doxycycline hyclate (VIBRA-TABS) 100 MG tablet; Take 1 tablet by mouth 2 times daily for 10 days, Disp-20 tablet, R-0Normal  -     methylPREDNISolone (MEDROL DOSEPACK) 4 MG tablet; Take by mouth as directed on packaging., Disp-21 tablet, R-0Normal  -     albuterol sulfate HFA (VENTOLIN HFA) 108 (90 Base) MCG/ACT inhaler; Inhale 2 puffs into the lungs 4 times daily as needed for Wheezing or Shortness of Breath, Disp-18 g, R-0Normal  2. Acute cough  -     benzonatate (TESSALON) 200 MG capsule; Take 1 capsule by mouth 3 times daily as needed for Cough, Disp-30 capsule, R-0Normal  3. Bronchospasm  -     methylPREDNISolone (MEDROL DOSEPACK) 4 MG tablet; Take by mouth as directed on packaging., Disp-21 tablet, R-0Normal  -     albuterol sulfate HFA (VENTOLIN HFA) 108 (90 Base) MCG/ACT inhaler; Inhale 2 puffs into the lungs 4 times daily as needed for Wheezing or Shortness of Breath, Disp-18 g, R-0Normal  4. SOB (shortness of breath)  -     methylPREDNISolone (MEDROL DOSEPACK) 4 MG tablet; Take by mouth as directed on packaging., Disp-21 tablet, R-0Normal  -     albuterol sulfate HFA (VENTOLIN HFA) 108 (90 Base) MCG/ACT inhaler; Inhale 2 puffs into the lungs 4 times daily as needed for Wheezing or Shortness of Breath, Disp-18 g, R-0Normal  5. Loose stools  6. Class 2 severe obesity due to excess calories with serious comorbidity and body mass index (BMI) of 36.0 to 36.9 in adult ([facility])  7. BMI 36.0-36.9,adult

## 2024-02-29 ENCOUNTER — APPOINTMENT (OUTPATIENT)
Dept: ULTRASOUND IMAGING | Age: 64
DRG: 872 | End: 2024-02-29
Payer: COMMERCIAL

## 2024-02-29 ENCOUNTER — APPOINTMENT (OUTPATIENT)
Dept: GENERAL RADIOLOGY | Age: 64
DRG: 872 | End: 2024-02-29
Payer: COMMERCIAL

## 2024-02-29 ENCOUNTER — APPOINTMENT (OUTPATIENT)
Dept: NUCLEAR MEDICINE | Age: 64
DRG: 872 | End: 2024-02-29
Payer: COMMERCIAL

## 2024-02-29 ENCOUNTER — HOSPITAL ENCOUNTER (INPATIENT)
Age: 64
LOS: 5 days | Discharge: HOME | DRG: 872 | End: 2024-03-05
Attending: EMERGENCY MEDICINE | Admitting: INTERNAL MEDICINE
Payer: COMMERCIAL

## 2024-02-29 DIAGNOSIS — N18.4 CKD (CHRONIC KIDNEY DISEASE) STAGE 4, GFR 15-29 ML/MIN (HCC): ICD-10-CM

## 2024-02-29 DIAGNOSIS — N17.9 AKI (ACUTE KIDNEY INJURY) (HCC): ICD-10-CM

## 2024-02-29 DIAGNOSIS — R00.0 TACHYCARDIA: ICD-10-CM

## 2024-02-29 DIAGNOSIS — J11.1 INFLUENZA: Primary | ICD-10-CM

## 2024-02-29 DIAGNOSIS — E87.5 HYPERKALEMIA: ICD-10-CM

## 2024-02-29 PROBLEM — R79.89 ELEVATED D-DIMER: Status: ACTIVE | Noted: 2024-02-29

## 2024-02-29 PROBLEM — E66.01 MORBID OBESITY DUE TO EXCESS CALORIES (HCC): Status: RESOLVED | Noted: 2017-10-04 | Resolved: 2024-02-29

## 2024-02-29 PROBLEM — E66.812 CLASS 2 SEVERE OBESITY WITH SERIOUS COMORBIDITY AND BODY MASS INDEX (BMI) OF 36.0 TO 36.9 IN ADULT: Status: ACTIVE | Noted: 2017-10-04

## 2024-02-29 PROBLEM — J10.1 INFLUENZA A: Status: ACTIVE | Noted: 2024-02-29

## 2024-02-29 LAB
ALBUMIN SERPL-MCNC: 4 G/DL (ref 3.5–5.2)
ALP SERPL-CCNC: 91 U/L (ref 40–129)
ALT SERPL-CCNC: 35 U/L (ref 5–41)
ANION GAP SERPL CALCULATED.3IONS-SCNC: 17 MMOL/L (ref 9–17)
ANION GAP SERPL CALCULATED.3IONS-SCNC: 19 MMOL/L (ref 9–17)
ANION GAP SERPL CALCULATED.3IONS-SCNC: 20 MMOL/L (ref 9–17)
ANION GAP SERPL CALCULATED.3IONS-SCNC: 20 MMOL/L (ref 9–17)
ANTI-XA UNFRAC HEPARIN: <0.1 IU/L (ref 0.3–0.7)
ANTI-XA UNFRAC HEPARIN: >1.1 IU/L (ref 0.3–0.7)
AST SERPL-CCNC: 51 U/L
B PARAP IS1001 DNA NPH QL NAA+NON-PROBE: NOT DETECTED
B PERT DNA SPEC QL NAA+PROBE: NOT DETECTED
B-OH-BUTYR SERPL-MCNC: 0.7 MMOL/L (ref 0.02–0.27)
BASOPHILS # BLD: 0 K/UL (ref 0–0.2)
BASOPHILS NFR BLD: 0 %
BILIRUB SERPL-MCNC: 0.2 MG/DL (ref 0.3–1.2)
BILIRUB UR QL STRIP: NEGATIVE
BUN SERPL-MCNC: 96 MG/DL (ref 8–23)
BUN SERPL-MCNC: 97 MG/DL (ref 8–23)
BUN SERPL-MCNC: 97 MG/DL (ref 8–23)
BUN SERPL-MCNC: 99 MG/DL (ref 8–23)
BUN/CREAT SERPL: 23 (ref 9–20)
BUN/CREAT SERPL: 24 (ref 9–20)
BUN/CREAT SERPL: 26 (ref 9–20)
BUN/CREAT SERPL: 27 (ref 9–20)
C PNEUM DNA NPH QL NAA+NON-PROBE: NOT DETECTED
CALCIUM SERPL-MCNC: 8.5 MG/DL (ref 8.6–10.4)
CALCIUM SERPL-MCNC: 8.6 MG/DL (ref 8.6–10.4)
CALCIUM SERPL-MCNC: 8.7 MG/DL (ref 8.6–10.4)
CALCIUM SERPL-MCNC: 9.4 MG/DL (ref 8.6–10.4)
CASTS #/AREA URNS LPF: NORMAL /LPF
CASTS #/AREA URNS LPF: NORMAL /LPF
CHLORIDE SERPL-SCNC: 100 MMOL/L (ref 98–107)
CHLORIDE SERPL-SCNC: 102 MMOL/L (ref 98–107)
CHLORIDE SERPL-SCNC: 103 MMOL/L (ref 98–107)
CHLORIDE SERPL-SCNC: 99 MMOL/L (ref 98–107)
CLARITY UR: CLEAR
CO2 SERPL-SCNC: 10 MMOL/L (ref 20–31)
CO2 SERPL-SCNC: 10 MMOL/L (ref 20–31)
CO2 SERPL-SCNC: 11 MMOL/L (ref 20–31)
CO2 SERPL-SCNC: 8 MMOL/L (ref 20–31)
COLOR UR: YELLOW
CREAT SERPL-MCNC: 3.6 MG/DL (ref 0.7–1.2)
CREAT SERPL-MCNC: 3.7 MG/DL (ref 0.7–1.2)
CREAT SERPL-MCNC: 4 MG/DL (ref 0.7–1.2)
CREAT SERPL-MCNC: 4.3 MG/DL (ref 0.7–1.2)
CREAT UR-MCNC: 70 MG/DL (ref 39–259)
CRITICAL ACTION: NORMAL
CRITICAL NOTIFICATION DATE/TIME: NORMAL
CRITICAL NOTIFICATION: NORMAL
CRITICAL VALUE READ BACK: YES
D DIMER PPP FEU-MCNC: 2.07 UG/ML FEU (ref 0–0.59)
EOSINOPHIL # BLD: 0 K/UL (ref 0–0.4)
EOSINOPHILS RELATIVE PERCENT: 0 % (ref 1–4)
EPI CELLS #/AREA URNS HPF: NORMAL /HPF (ref 0–5)
ERYTHROCYTE [DISTWIDTH] IN BLOOD BY AUTOMATED COUNT: 13.4 % (ref 11.8–14.4)
FIO2: 28
FLUAV H3 RNA NPH QL NAA+NON-PROBE: DETECTED
FLUAV RNA NPH QL NAA+NON-PROBE: DETECTED
FLUAV RNA RESP QL NAA+PROBE: DETECTED
FLUBV RNA NPH QL NAA+NON-PROBE: NOT DETECTED
FLUBV RNA RESP QL NAA+PROBE: NOT DETECTED
GFR SERPL CREATININE-BSD FRML MDRD: 14 ML/MIN/1.73M2
GFR SERPL CREATININE-BSD FRML MDRD: 16 ML/MIN/1.73M2
GFR SERPL CREATININE-BSD FRML MDRD: 17 ML/MIN/1.73M2
GFR SERPL CREATININE-BSD FRML MDRD: 18 ML/MIN/1.73M2
GLUCOSE BLD-MCNC: 244 MG/DL (ref 75–110)
GLUCOSE BLD-MCNC: 247 MG/DL (ref 75–110)
GLUCOSE BLD-MCNC: 253 MG/DL (ref 75–110)
GLUCOSE SERPL-MCNC: 229 MG/DL (ref 70–99)
GLUCOSE SERPL-MCNC: 235 MG/DL (ref 70–99)
GLUCOSE SERPL-MCNC: 263 MG/DL (ref 70–99)
GLUCOSE SERPL-MCNC: 264 MG/DL (ref 70–99)
GLUCOSE UR STRIP-MCNC: ABNORMAL MG/DL
HADV DNA NPH QL NAA+NON-PROBE: NOT DETECTED
HCO3 VENOUS: 9.5 MMOL/L (ref 22–29)
HCOV 229E RNA NPH QL NAA+NON-PROBE: NOT DETECTED
HCOV HKU1 RNA NPH QL NAA+NON-PROBE: NOT DETECTED
HCOV NL63 RNA NPH QL NAA+NON-PROBE: NOT DETECTED
HCOV OC43 RNA NPH QL NAA+NON-PROBE: NOT DETECTED
HCT VFR BLD AUTO: 50.1 % (ref 40.7–50.3)
HGB BLD-MCNC: 16.5 G/DL (ref 13–17)
HGB UR QL STRIP.AUTO: ABNORMAL
HMPV RNA NPH QL NAA+NON-PROBE: NOT DETECTED
HPIV1 RNA NPH QL NAA+NON-PROBE: NOT DETECTED
HPIV2 RNA NPH QL NAA+NON-PROBE: NOT DETECTED
HPIV3 RNA NPH QL NAA+NON-PROBE: NOT DETECTED
HPIV4 RNA NPH QL NAA+NON-PROBE: NOT DETECTED
IMM GRANULOCYTES # BLD AUTO: 0.24 K/UL (ref 0–0.3)
IMM GRANULOCYTES NFR BLD: 1 %
INR PPP: 1.1
KETONES UR STRIP-MCNC: NEGATIVE MG/DL
LACTATE BLDV-SCNC: 1.9 MMOL/L (ref 0.5–1.9)
LACTATE BLDV-SCNC: 2.5 MMOL/L (ref 0.5–1.9)
LEUKOCYTE ESTERASE UR QL STRIP: NEGATIVE
LIPASE SERPL-CCNC: 166 U/L (ref 13–60)
LYMPHOCYTES NFR BLD: 2.42 K/UL (ref 1–4.8)
LYMPHOCYTES RELATIVE PERCENT: 10 % (ref 24–44)
M PNEUMO DNA NPH QL NAA+NON-PROBE: NOT DETECTED
MAGNESIUM SERPL-MCNC: 1.9 MG/DL (ref 1.6–2.6)
MCH RBC QN AUTO: 30.2 PG (ref 25.2–33.5)
MCHC RBC AUTO-ENTMCNC: 32.9 G/DL (ref 28.4–34.8)
MCV RBC AUTO: 91.6 FL (ref 82.6–102.9)
MICROALBUMIN UR-MCNC: 2283 MG/L
MICROALBUMIN/CREAT UR-RTO: 3261 MCG/MG CREAT
MONOCYTES NFR BLD: 1.21 K/UL (ref 0.2–0.8)
MONOCYTES NFR BLD: 5 % (ref 1–7)
NEGATIVE BASE EXCESS, ART: 17.4 MMOL/L (ref 0–2)
NEGATIVE BASE EXCESS, VEN: 20.8 MMOL/L (ref 0–2)
NEUTROPHILS NFR BLD: 84 % (ref 36–66)
NEUTS SEG NFR BLD: 20.33 K/UL (ref 1.8–7.7)
NITRITE UR QL STRIP: NEGATIVE
NRBC BLD-RTO: 0 PER 100 WBC
O2 DELIVERY DEVICE: ABNORMAL
O2 SAT, VEN: 54.8 % (ref 60–85)
OSMOLALITY SERPL: 322 MOSM/KG (ref 275–295)
OSMOLALITY UR: 403 MOSM/KG (ref 80–1300)
PARTIAL THROMBOPLASTIN TIME: 29.8 SEC (ref 23.9–33.8)
PCO2, VEN: 36.1 MM HG (ref 41–51)
PH UR STRIP: 5.5 [PH] (ref 5–8)
PH VENOUS: 7.03 (ref 7.32–7.43)
PLATELET # BLD AUTO: 266 K/UL (ref 138–453)
PMV BLD AUTO: 10.7 FL (ref 8.1–13.5)
PO2, VEN: 41.6 MM HG (ref 30–50)
POC HCO3: 9.1 MMOL/L (ref 21–28)
POC O2 SATURATION: 96.5 % (ref 94–98)
POC PCO2: 24.4 MM HG (ref 35–48)
POC PH: 7.18 (ref 7.35–7.45)
POC PO2: 104.5 MM HG (ref 83–108)
POTASSIUM SERPL-SCNC: 5 MMOL/L (ref 3.7–5.3)
POTASSIUM SERPL-SCNC: 5.3 MMOL/L (ref 3.7–5.3)
POTASSIUM SERPL-SCNC: 5.3 MMOL/L (ref 3.7–5.3)
POTASSIUM SERPL-SCNC: 5.4 MMOL/L (ref 3.7–5.3)
POTASSIUM SERPL-SCNC: 5.6 MMOL/L (ref 3.7–5.3)
PROT SERPL-MCNC: 8.4 G/DL (ref 6.4–8.3)
PROT UR STRIP-MCNC: ABNORMAL MG/DL
PROTHROMBIN TIME: 14.2 SEC (ref 11.5–14.2)
RBC # BLD AUTO: 5.47 M/UL (ref 4.21–5.77)
RBC #/AREA URNS HPF: NORMAL /HPF (ref 0–2)
RSV RNA NPH QL NAA+NON-PROBE: NOT DETECTED
RV+EV RNA NPH QL NAA+NON-PROBE: NOT DETECTED
SAMPLE SITE: ABNORMAL
SARS-COV-2 RNA NPH QL NAA+NON-PROBE: NOT DETECTED
SARS-COV-2 RNA RESP QL NAA+PROBE: NOT DETECTED
SODIUM SERPL-SCNC: 129 MMOL/L (ref 135–144)
SODIUM SERPL-SCNC: 129 MMOL/L (ref 135–144)
SODIUM SERPL-SCNC: 130 MMOL/L (ref 135–144)
SODIUM SERPL-SCNC: 131 MMOL/L (ref 135–144)
SODIUM UR-SCNC: 37 MMOL/L
SOURCE: ABNORMAL
SP GR UR STRIP: 1.02 (ref 1–1.03)
SPECIMEN DESCRIPTION: ABNORMAL
SPECIMEN DESCRIPTION: ABNORMAL
TROPONIN I SERPL HS-MCNC: 73 NG/L (ref 0–22)
TROPONIN I SERPL HS-MCNC: 74 NG/L (ref 0–22)
UROBILINOGEN UR STRIP-ACNC: NORMAL EU/DL (ref 0–1)
WBC #/AREA URNS HPF: NORMAL /HPF (ref 0–5)
WBC OTHER # BLD: 24.2 K/UL (ref 3.5–11.3)

## 2024-02-29 PROCEDURE — 83930 ASSAY OF BLOOD OSMOLALITY: CPT

## 2024-02-29 PROCEDURE — 94761 N-INVAS EAR/PLS OXIMETRY MLT: CPT

## 2024-02-29 PROCEDURE — 85379 FIBRIN DEGRADATION QUANT: CPT

## 2024-02-29 PROCEDURE — 2580000003 HC RX 258: Performed by: NURSE PRACTITIONER

## 2024-02-29 PROCEDURE — 80048 BASIC METABOLIC PNL TOTAL CA: CPT

## 2024-02-29 PROCEDURE — 93005 ELECTROCARDIOGRAM TRACING: CPT | Performed by: INTERNAL MEDICINE

## 2024-02-29 PROCEDURE — 6370000000 HC RX 637 (ALT 250 FOR IP): Performed by: EMERGENCY MEDICINE

## 2024-02-29 PROCEDURE — 2500000003 HC RX 250 WO HCPCS: Performed by: EMERGENCY MEDICINE

## 2024-02-29 PROCEDURE — 85610 PROTHROMBIN TIME: CPT

## 2024-02-29 PROCEDURE — 84484 ASSAY OF TROPONIN QUANT: CPT

## 2024-02-29 PROCEDURE — 84300 ASSAY OF URINE SODIUM: CPT

## 2024-02-29 PROCEDURE — A9539 TC99M PENTETATE: HCPCS | Performed by: EMERGENCY MEDICINE

## 2024-02-29 PROCEDURE — 81001 URINALYSIS AUTO W/SCOPE: CPT

## 2024-02-29 PROCEDURE — 99223 1ST HOSP IP/OBS HIGH 75: CPT | Performed by: NURSE PRACTITIONER

## 2024-02-29 PROCEDURE — 6360000002 HC RX W HCPCS: Performed by: NURSE PRACTITIONER

## 2024-02-29 PROCEDURE — 2700000000 HC OXYGEN THERAPY PER DAY

## 2024-02-29 PROCEDURE — 2580000003 HC RX 258: Performed by: EMERGENCY MEDICINE

## 2024-02-29 PROCEDURE — 71045 X-RAY EXAM CHEST 1 VIEW: CPT

## 2024-02-29 PROCEDURE — 82043 UR ALBUMIN QUANTITATIVE: CPT

## 2024-02-29 PROCEDURE — 2500000003 HC RX 250 WO HCPCS

## 2024-02-29 PROCEDURE — 93005 ELECTROCARDIOGRAM TRACING: CPT | Performed by: EMERGENCY MEDICINE

## 2024-02-29 PROCEDURE — 85520 HEPARIN ASSAY: CPT

## 2024-02-29 PROCEDURE — 83935 ASSAY OF URINE OSMOLALITY: CPT

## 2024-02-29 PROCEDURE — 82803 BLOOD GASES ANY COMBINATION: CPT

## 2024-02-29 PROCEDURE — 2580000003 HC RX 258: Performed by: INTERNAL MEDICINE

## 2024-02-29 PROCEDURE — 99285 EMERGENCY DEPT VISIT HI MDM: CPT

## 2024-02-29 PROCEDURE — 82570 ASSAY OF URINE CREATININE: CPT

## 2024-02-29 PROCEDURE — 3430000000 HC RX DIAGNOSTIC RADIOPHARMACEUTICAL: Performed by: EMERGENCY MEDICINE

## 2024-02-29 PROCEDURE — 83735 ASSAY OF MAGNESIUM: CPT

## 2024-02-29 PROCEDURE — 83690 ASSAY OF LIPASE: CPT

## 2024-02-29 PROCEDURE — 96375 TX/PRO/DX INJ NEW DRUG ADDON: CPT

## 2024-02-29 PROCEDURE — 82947 ASSAY GLUCOSE BLOOD QUANT: CPT

## 2024-02-29 PROCEDURE — 87636 SARSCOV2 & INF A&B AMP PRB: CPT

## 2024-02-29 PROCEDURE — 96361 HYDRATE IV INFUSION ADD-ON: CPT

## 2024-02-29 PROCEDURE — 0202U NFCT DS 22 TRGT SARS-COV-2: CPT

## 2024-02-29 PROCEDURE — 76770 US EXAM ABDO BACK WALL COMP: CPT

## 2024-02-29 PROCEDURE — 96374 THER/PROPH/DIAG INJ IV PUSH: CPT

## 2024-02-29 PROCEDURE — 78582 LUNG VENTILAT&PERFUS IMAGING: CPT

## 2024-02-29 PROCEDURE — 36600 WITHDRAWAL OF ARTERIAL BLOOD: CPT

## 2024-02-29 PROCEDURE — 87040 BLOOD CULTURE FOR BACTERIA: CPT

## 2024-02-29 PROCEDURE — 6370000000 HC RX 637 (ALT 250 FOR IP): Performed by: INTERNAL MEDICINE

## 2024-02-29 PROCEDURE — 2500000003 HC RX 250 WO HCPCS: Performed by: INTERNAL MEDICINE

## 2024-02-29 PROCEDURE — 6370000000 HC RX 637 (ALT 250 FOR IP): Performed by: NURSE PRACTITIONER

## 2024-02-29 PROCEDURE — 82010 KETONE BODYS QUAN: CPT

## 2024-02-29 PROCEDURE — 83605 ASSAY OF LACTIC ACID: CPT

## 2024-02-29 PROCEDURE — A9540 TC99M MAA: HCPCS | Performed by: EMERGENCY MEDICINE

## 2024-02-29 PROCEDURE — 2060000000 HC ICU INTERMEDIATE R&B

## 2024-02-29 PROCEDURE — 6360000002 HC RX W HCPCS: Performed by: EMERGENCY MEDICINE

## 2024-02-29 PROCEDURE — 85025 COMPLETE CBC W/AUTO DIFF WBC: CPT

## 2024-02-29 PROCEDURE — 85730 THROMBOPLASTIN TIME PARTIAL: CPT

## 2024-02-29 PROCEDURE — 87086 URINE CULTURE/COLONY COUNT: CPT

## 2024-02-29 PROCEDURE — 36415 COLL VENOUS BLD VENIPUNCTURE: CPT

## 2024-02-29 PROCEDURE — 80053 COMPREHEN METABOLIC PANEL: CPT

## 2024-02-29 RX ORDER — ADENOSINE 3 MG/ML
INJECTION INTRAVENOUS
Status: DISCONTINUED
Start: 2024-02-29 | End: 2024-02-29 | Stop reason: WASHOUT

## 2024-02-29 RX ORDER — ASCORBIC ACID 500 MG
1000 TABLET ORAL 3 TIMES DAILY
Status: DISCONTINUED | OUTPATIENT
Start: 2024-02-29 | End: 2024-03-05 | Stop reason: HOSPADM

## 2024-02-29 RX ORDER — ONDANSETRON 2 MG/ML
4 INJECTION INTRAMUSCULAR; INTRAVENOUS EVERY 6 HOURS PRN
Status: DISCONTINUED | OUTPATIENT
Start: 2024-02-29 | End: 2024-03-05 | Stop reason: HOSPADM

## 2024-02-29 RX ORDER — INSULIN LISPRO 100 [IU]/ML
0-4 INJECTION, SOLUTION INTRAVENOUS; SUBCUTANEOUS NIGHTLY
Status: DISCONTINUED | OUTPATIENT
Start: 2024-02-29 | End: 2024-03-05 | Stop reason: HOSPADM

## 2024-02-29 RX ORDER — HEPARIN SODIUM 1000 [USP'U]/ML
80 INJECTION, SOLUTION INTRAVENOUS; SUBCUTANEOUS ONCE
Status: COMPLETED | OUTPATIENT
Start: 2024-02-29 | End: 2024-02-29

## 2024-02-29 RX ORDER — ACETAMINOPHEN 325 MG/1
650 TABLET ORAL EVERY 6 HOURS PRN
Status: DISCONTINUED | OUTPATIENT
Start: 2024-02-29 | End: 2024-03-05 | Stop reason: HOSPADM

## 2024-02-29 RX ORDER — PRAVASTATIN SODIUM 20 MG
20 TABLET ORAL DAILY
Status: DISCONTINUED | OUTPATIENT
Start: 2024-02-29 | End: 2024-03-05 | Stop reason: HOSPADM

## 2024-02-29 RX ORDER — ONDANSETRON 4 MG/1
4 TABLET, ORALLY DISINTEGRATING ORAL EVERY 8 HOURS PRN
Status: DISCONTINUED | OUTPATIENT
Start: 2024-02-29 | End: 2024-03-05 | Stop reason: HOSPADM

## 2024-02-29 RX ORDER — FOLIC ACID/VIT B COMPLEX AND C 0.8 MG
1 TABLET ORAL DAILY
Status: DISCONTINUED | OUTPATIENT
Start: 2024-03-01 | End: 2024-03-05 | Stop reason: HOSPADM

## 2024-02-29 RX ORDER — 0.9 % SODIUM CHLORIDE 0.9 %
1000 INTRAVENOUS SOLUTION INTRAVENOUS ONCE
Status: COMPLETED | OUTPATIENT
Start: 2024-02-29 | End: 2024-02-29

## 2024-02-29 RX ORDER — MORPHINE SULFATE 4 MG/ML
4 INJECTION, SOLUTION INTRAMUSCULAR; INTRAVENOUS ONCE
Status: COMPLETED | OUTPATIENT
Start: 2024-02-29 | End: 2024-02-29

## 2024-02-29 RX ORDER — SODIUM CHLORIDE 0.9 % (FLUSH) 0.9 %
5-40 SYRINGE (ML) INJECTION PRN
Status: DISCONTINUED | OUTPATIENT
Start: 2024-02-29 | End: 2024-03-05 | Stop reason: HOSPADM

## 2024-02-29 RX ORDER — 0.9 % SODIUM CHLORIDE 0.9 %
500 INTRAVENOUS SOLUTION INTRAVENOUS ONCE
Status: COMPLETED | OUTPATIENT
Start: 2024-02-29 | End: 2024-02-29

## 2024-02-29 RX ORDER — MORPHINE SULFATE 2 MG/ML
2 INJECTION, SOLUTION INTRAMUSCULAR; INTRAVENOUS ONCE
Status: COMPLETED | OUTPATIENT
Start: 2024-02-29 | End: 2024-02-29

## 2024-02-29 RX ORDER — BENZONATATE 100 MG/1
200 CAPSULE ORAL 3 TIMES DAILY PRN
Status: DISCONTINUED | OUTPATIENT
Start: 2024-02-29 | End: 2024-03-05 | Stop reason: HOSPADM

## 2024-02-29 RX ORDER — DULOXETIN HYDROCHLORIDE 20 MG/1
20 CAPSULE, DELAYED RELEASE ORAL DAILY
Status: DISCONTINUED | OUTPATIENT
Start: 2024-02-29 | End: 2024-03-05 | Stop reason: HOSPADM

## 2024-02-29 RX ORDER — HEPARIN SODIUM 1000 [USP'U]/ML
40 INJECTION, SOLUTION INTRAVENOUS; SUBCUTANEOUS PRN
Status: DISCONTINUED | OUTPATIENT
Start: 2024-02-29 | End: 2024-03-01

## 2024-02-29 RX ORDER — METOPROLOL TARTRATE 1 MG/ML
INJECTION, SOLUTION INTRAVENOUS
Status: COMPLETED
Start: 2024-02-29 | End: 2024-02-29

## 2024-02-29 RX ORDER — GUAIFENESIN 200 MG/10ML
200 LIQUID ORAL ONCE
Status: COMPLETED | OUTPATIENT
Start: 2024-02-29 | End: 2024-02-29

## 2024-02-29 RX ORDER — METOPROLOL TARTRATE 1 MG/ML
10 INJECTION, SOLUTION INTRAVENOUS ONCE
Status: DISCONTINUED | OUTPATIENT
Start: 2024-02-29 | End: 2024-02-29

## 2024-02-29 RX ORDER — DILTIAZEM HYDROCHLORIDE 60 MG/1
60 TABLET, FILM COATED ORAL EVERY 12 HOURS
Status: DISCONTINUED | OUTPATIENT
Start: 2024-02-29 | End: 2024-03-05 | Stop reason: HOSPADM

## 2024-02-29 RX ORDER — HYDROXYZINE HYDROCHLORIDE 25 MG/1
50 TABLET, FILM COATED ORAL NIGHTLY
Status: DISCONTINUED | OUTPATIENT
Start: 2024-02-29 | End: 2024-03-05 | Stop reason: HOSPADM

## 2024-02-29 RX ORDER — LANOLIN ALCOHOL/MO/W.PET/CERES
325 CREAM (GRAM) TOPICAL 2 TIMES DAILY
Status: DISCONTINUED | OUTPATIENT
Start: 2024-02-29 | End: 2024-03-01

## 2024-02-29 RX ORDER — OSELTAMIVIR PHOSPHATE 75 MG/1
75 CAPSULE ORAL ONCE
Status: COMPLETED | OUTPATIENT
Start: 2024-02-29 | End: 2024-02-29

## 2024-02-29 RX ORDER — DEXTROSE MONOHYDRATE 100 MG/ML
INJECTION, SOLUTION INTRAVENOUS CONTINUOUS PRN
Status: DISCONTINUED | OUTPATIENT
Start: 2024-02-29 | End: 2024-02-29

## 2024-02-29 RX ORDER — VITAMIN B COMPLEX
2000 TABLET ORAL DAILY
Status: DISCONTINUED | OUTPATIENT
Start: 2024-02-29 | End: 2024-03-05 | Stop reason: HOSPADM

## 2024-02-29 RX ORDER — DEXTROSE MONOHYDRATE 100 MG/ML
INJECTION, SOLUTION INTRAVENOUS CONTINUOUS PRN
Status: DISCONTINUED | OUTPATIENT
Start: 2024-02-29 | End: 2024-03-05 | Stop reason: HOSPADM

## 2024-02-29 RX ORDER — SODIUM CHLORIDE FOR INHALATION 0.9 %
3 VIAL, NEBULIZER (ML) INHALATION EVERY 8 HOURS PRN
Status: DISCONTINUED | OUTPATIENT
Start: 2024-02-29 | End: 2024-03-05 | Stop reason: HOSPADM

## 2024-02-29 RX ORDER — METOPROLOL TARTRATE 1 MG/ML
5 INJECTION, SOLUTION INTRAVENOUS ONCE
Status: COMPLETED | OUTPATIENT
Start: 2024-02-29 | End: 2024-02-29

## 2024-02-29 RX ORDER — M-VIT,TX,IRON,MINS/CALC/FOLIC 27MG-0.4MG
1 TABLET ORAL DAILY
Status: DISCONTINUED | OUTPATIENT
Start: 2024-02-29 | End: 2024-02-29

## 2024-02-29 RX ORDER — FLUTICASONE PROPIONATE 50 MCG
2 SPRAY, SUSPENSION (ML) NASAL 2 TIMES DAILY
Status: DISCONTINUED | OUTPATIENT
Start: 2024-02-29 | End: 2024-03-05 | Stop reason: HOSPADM

## 2024-02-29 RX ORDER — HEPARIN SODIUM 1000 [USP'U]/ML
80 INJECTION, SOLUTION INTRAVENOUS; SUBCUTANEOUS PRN
Status: DISCONTINUED | OUTPATIENT
Start: 2024-02-29 | End: 2024-03-01

## 2024-02-29 RX ORDER — DILTIAZEM HYDROCHLORIDE 5 MG/ML
10 INJECTION INTRAVENOUS ONCE
Status: COMPLETED | OUTPATIENT
Start: 2024-02-29 | End: 2024-02-29

## 2024-02-29 RX ORDER — ALLOPURINOL 100 MG/1
100 TABLET ORAL DAILY
Status: DISCONTINUED | OUTPATIENT
Start: 2024-02-29 | End: 2024-03-01

## 2024-02-29 RX ORDER — SODIUM CHLORIDE 9 MG/ML
INJECTION, SOLUTION INTRAVENOUS PRN
Status: DISCONTINUED | OUTPATIENT
Start: 2024-02-29 | End: 2024-02-29

## 2024-02-29 RX ORDER — METOPROLOL TARTRATE 100 MG/1
100 TABLET ORAL 2 TIMES DAILY
Status: DISCONTINUED | OUTPATIENT
Start: 2024-02-29 | End: 2024-03-05 | Stop reason: HOSPADM

## 2024-02-29 RX ORDER — DILTIAZEM HCL IN NACL,ISO-OSM 125 MG/125
2.5-15 PLASTIC BAG, INJECTION (ML) INTRAVENOUS CONTINUOUS
Status: DISCONTINUED | OUTPATIENT
Start: 2024-02-29 | End: 2024-03-01

## 2024-02-29 RX ORDER — INSULIN LISPRO 100 [IU]/ML
0-4 INJECTION, SOLUTION INTRAVENOUS; SUBCUTANEOUS
Status: DISCONTINUED | OUTPATIENT
Start: 2024-02-29 | End: 2024-03-05 | Stop reason: HOSPADM

## 2024-02-29 RX ORDER — LISINOPRIL 5 MG/1
5 TABLET ORAL DAILY
Status: DISCONTINUED | OUTPATIENT
Start: 2024-02-29 | End: 2024-03-05 | Stop reason: HOSPADM

## 2024-02-29 RX ORDER — KIT FOR THE PREPARATION OF TECHNETIUM TC 99M PENTETATE 20 MG/1
44.5 INJECTION, POWDER, LYOPHILIZED, FOR SOLUTION INTRAVENOUS; RESPIRATORY (INHALATION)
Status: COMPLETED | OUTPATIENT
Start: 2024-02-29 | End: 2024-02-29

## 2024-02-29 RX ORDER — OSELTAMIVIR PHOSPHATE 30 MG/1
30 CAPSULE ORAL DAILY
Status: DISPENSED | OUTPATIENT
Start: 2024-02-29 | End: 2024-03-05

## 2024-02-29 RX ORDER — ONDANSETRON 2 MG/ML
4 INJECTION INTRAMUSCULAR; INTRAVENOUS ONCE
Status: COMPLETED | OUTPATIENT
Start: 2024-02-29 | End: 2024-02-29

## 2024-02-29 RX ORDER — HEPARIN SODIUM 10000 [USP'U]/100ML
5-30 INJECTION, SOLUTION INTRAVENOUS CONTINUOUS
Status: DISCONTINUED | OUTPATIENT
Start: 2024-02-29 | End: 2024-03-01

## 2024-02-29 RX ORDER — 0.9 % SODIUM CHLORIDE 0.9 %
6188 INTRAVENOUS SOLUTION INTRAVENOUS ONCE
Status: COMPLETED | OUTPATIENT
Start: 2024-02-29 | End: 2024-02-29

## 2024-02-29 RX ORDER — SODIUM CHLORIDE 0.9 % (FLUSH) 0.9 %
5-40 SYRINGE (ML) INJECTION EVERY 12 HOURS SCHEDULED
Status: DISCONTINUED | OUTPATIENT
Start: 2024-02-29 | End: 2024-03-05 | Stop reason: HOSPADM

## 2024-02-29 RX ORDER — SODIUM CHLORIDE 9 MG/ML
INJECTION, SOLUTION INTRAVENOUS PRN
Status: DISCONTINUED | OUTPATIENT
Start: 2024-02-29 | End: 2024-03-05 | Stop reason: HOSPADM

## 2024-02-29 RX ORDER — SODIUM BICARBONATE 650 MG/1
1300 TABLET ORAL 3 TIMES DAILY
Status: DISCONTINUED | OUTPATIENT
Start: 2024-02-29 | End: 2024-03-04

## 2024-02-29 RX ORDER — SODIUM CHLORIDE 0.9 % (FLUSH) 0.9 %
5-40 SYRINGE (ML) INJECTION EVERY 12 HOURS SCHEDULED
Status: DISCONTINUED | OUTPATIENT
Start: 2024-02-29 | End: 2024-02-29

## 2024-02-29 RX ORDER — ALBUTEROL SULFATE 90 UG/1
2 AEROSOL, METERED RESPIRATORY (INHALATION) 4 TIMES DAILY PRN
Status: DISCONTINUED | OUTPATIENT
Start: 2024-02-29 | End: 2024-03-05 | Stop reason: HOSPADM

## 2024-02-29 RX ORDER — SODIUM CHLORIDE 0.9 % (FLUSH) 0.9 %
5-40 SYRINGE (ML) INJECTION PRN
Status: DISCONTINUED | OUTPATIENT
Start: 2024-02-29 | End: 2024-02-29

## 2024-02-29 RX ORDER — AZELASTINE 1 MG/ML
2 SPRAY, METERED NASAL 2 TIMES DAILY
Status: DISCONTINUED | OUTPATIENT
Start: 2024-02-29 | End: 2024-03-05 | Stop reason: HOSPADM

## 2024-02-29 RX ORDER — ACETAMINOPHEN 650 MG/1
650 SUPPOSITORY RECTAL EVERY 6 HOURS PRN
Status: DISCONTINUED | OUTPATIENT
Start: 2024-02-29 | End: 2024-03-05 | Stop reason: HOSPADM

## 2024-02-29 RX ORDER — LEVALBUTEROL 1.25 MG/.5ML
1.25 SOLUTION, CONCENTRATE RESPIRATORY (INHALATION) EVERY 8 HOURS PRN
Status: DISCONTINUED | OUTPATIENT
Start: 2024-02-29 | End: 2024-03-05 | Stop reason: HOSPADM

## 2024-02-29 RX ADMIN — AZELASTINE HYDROCHLORIDE 2 SPRAY: 137 SPRAY, METERED NASAL at 21:40

## 2024-02-29 RX ADMIN — ONDANSETRON 4 MG: 2 INJECTION INTRAMUSCULAR; INTRAVENOUS at 14:07

## 2024-02-29 RX ADMIN — PRAVASTATIN SODIUM 20 MG: 20 TABLET ORAL at 16:48

## 2024-02-29 RX ADMIN — INSULIN HUMAN 10 UNITS: 100 INJECTION, SOLUTION PARENTERAL at 09:34

## 2024-02-29 RX ADMIN — SODIUM CHLORIDE 1000 ML: 9 INJECTION, SOLUTION INTRAVENOUS at 08:44

## 2024-02-29 RX ADMIN — SODIUM CHLORIDE 1000 ML: 9 INJECTION, SOLUTION INTRAVENOUS at 09:23

## 2024-02-29 RX ADMIN — SODIUM ZIRCONIUM CYCLOSILICATE 10 G: 10 POWDER, FOR SUSPENSION ORAL at 09:34

## 2024-02-29 RX ADMIN — HEPARIN SODIUM 9580 UNITS: 1000 INJECTION INTRAVENOUS; SUBCUTANEOUS at 13:14

## 2024-02-29 RX ADMIN — DULOXETINE HYDROCHLORIDE 20 MG: 20 CAPSULE, DELAYED RELEASE ORAL at 16:46

## 2024-02-29 RX ADMIN — DILTIAZEM HYDROCHLORIDE 10 MG: 5 INJECTION, SOLUTION INTRAVENOUS at 13:59

## 2024-02-29 RX ADMIN — DILTIAZEM HYDROCHLORIDE 10 MG: 5 INJECTION, SOLUTION INTRAVENOUS at 10:51

## 2024-02-29 RX ADMIN — METOPROLOL TARTRATE 5 MG: 1 INJECTION, SOLUTION INTRAVENOUS at 08:42

## 2024-02-29 RX ADMIN — DEXTROSE MONOHYDRATE 250 ML: 100 INJECTION, SOLUTION INTRAVENOUS at 09:34

## 2024-02-29 RX ADMIN — Medication 2000 UNITS: at 20:59

## 2024-02-29 RX ADMIN — OXYCODONE HYDROCHLORIDE AND ACETAMINOPHEN 1000 MG: 500 TABLET ORAL at 16:47

## 2024-02-29 RX ADMIN — SODIUM BICARBONATE: 84 INJECTION, SOLUTION INTRAVENOUS at 20:46

## 2024-02-29 RX ADMIN — SODIUM ZIRCONIUM CYCLOSILICATE 5 G: 5 POWDER, FOR SUSPENSION ORAL at 21:01

## 2024-02-29 RX ADMIN — FERROUS SULFATE TAB EC 325 MG (65 MG FE EQUIVALENT) 325 MG: 325 (65 FE) TABLET DELAYED RESPONSE at 20:59

## 2024-02-29 RX ADMIN — OXYCODONE HYDROCHLORIDE AND ACETAMINOPHEN 1000 MG: 500 TABLET ORAL at 20:59

## 2024-02-29 RX ADMIN — SODIUM BICARBONATE: 84 INJECTION, SOLUTION INTRAVENOUS at 22:59

## 2024-02-29 RX ADMIN — Medication 5 MG/HR: at 17:23

## 2024-02-29 RX ADMIN — FLUTICASONE PROPIONATE 2 SPRAY: 50 SPRAY, METERED NASAL at 21:12

## 2024-02-29 RX ADMIN — INSULIN LISPRO 1 UNITS: 100 INJECTION, SOLUTION INTRAVENOUS; SUBCUTANEOUS at 17:23

## 2024-02-29 RX ADMIN — MORPHINE SULFATE 2 MG: 2 INJECTION, SOLUTION INTRAMUSCULAR; INTRAVENOUS at 21:41

## 2024-02-29 RX ADMIN — WATER 125 MG: 1 INJECTION INTRAMUSCULAR; INTRAVENOUS; SUBCUTANEOUS at 09:04

## 2024-02-29 RX ADMIN — SODIUM BICARBONATE: 84 INJECTION, SOLUTION INTRAVENOUS at 10:21

## 2024-02-29 RX ADMIN — BENZONATATE 200 MG: 100 CAPSULE ORAL at 16:47

## 2024-02-29 RX ADMIN — Medication 10 MG/HR: at 17:29

## 2024-02-29 RX ADMIN — SODIUM BICARBONATE 1300 MG: 650 TABLET ORAL at 16:46

## 2024-02-29 RX ADMIN — Medication 1 TABLET: at 17:23

## 2024-02-29 RX ADMIN — KIT FOR THE PREPARATION OF TECHNETIUM TC 99M PENTETATE 44.5 MILLICURIE: 20 INJECTION, POWDER, LYOPHILIZED, FOR SOLUTION INTRAVENOUS; RESPIRATORY (INHALATION) at 12:29

## 2024-02-29 RX ADMIN — HYDROXYZINE HYDROCHLORIDE 50 MG: 25 TABLET, FILM COATED ORAL at 20:59

## 2024-02-29 RX ADMIN — SODIUM CHLORIDE 500 ML: 0.9 INJECTION, SOLUTION INTRAVENOUS at 19:27

## 2024-02-29 RX ADMIN — HEPARIN SODIUM 17 UNITS/KG/HR: 10000 INJECTION, SOLUTION INTRAVENOUS at 13:17

## 2024-02-29 RX ADMIN — METOPROLOL TARTRATE 5 MG: 5 INJECTION INTRAVENOUS at 08:44

## 2024-02-29 RX ADMIN — Medication 8 MILLICURIE: at 12:45

## 2024-02-29 RX ADMIN — Medication 1 MG: at 21:01

## 2024-02-29 RX ADMIN — METOPROLOL TARTRATE 5 MG: 5 INJECTION INTRAVENOUS at 08:42

## 2024-02-29 RX ADMIN — GUAIFENESIN 200 MG: 200 SOLUTION ORAL at 10:23

## 2024-02-29 RX ADMIN — SODIUM BICARBONATE 1300 MG: 650 TABLET ORAL at 20:59

## 2024-02-29 RX ADMIN — MORPHINE SULFATE 4 MG: 4 INJECTION, SOLUTION INTRAMUSCULAR; INTRAVENOUS at 14:07

## 2024-02-29 RX ADMIN — Medication 12.5 MG/HR: at 18:25

## 2024-02-29 RX ADMIN — OSELTAMIVIR PHOSPHATE 75 MG: 75 CAPSULE ORAL at 10:23

## 2024-02-29 RX ADMIN — SODIUM CHLORIDE 4000 ML: 9 INJECTION, SOLUTION INTRAVENOUS at 10:02

## 2024-02-29 RX ADMIN — METOPROLOL 100 MG: 100 TABLET ORAL at 20:59

## 2024-02-29 RX ADMIN — SODIUM ZIRCONIUM CYCLOSILICATE 5 G: 5 POWDER, FOR SUSPENSION ORAL at 13:13

## 2024-02-29 ASSESSMENT — PAIN DESCRIPTION - DESCRIPTORS: DESCRIPTORS: ACHING;NAGGING

## 2024-02-29 ASSESSMENT — PAIN SCALES - GENERAL
PAINLEVEL_OUTOF10: 7
PAINLEVEL_OUTOF10: 7
PAINLEVEL_OUTOF10: 3
PAINLEVEL_OUTOF10: 7

## 2024-02-29 ASSESSMENT — LIFESTYLE VARIABLES
HOW OFTEN DO YOU HAVE A DRINK CONTAINING ALCOHOL: MONTHLY OR LESS
HOW MANY STANDARD DRINKS CONTAINING ALCOHOL DO YOU HAVE ON A TYPICAL DAY: 1 OR 2

## 2024-02-29 ASSESSMENT — PAIN DESCRIPTION - ORIENTATION: ORIENTATION: MID

## 2024-02-29 ASSESSMENT — PAIN - FUNCTIONAL ASSESSMENT
PAIN_FUNCTIONAL_ASSESSMENT: 0-10
PAIN_FUNCTIONAL_ASSESSMENT: ACTIVITIES ARE NOT PREVENTED

## 2024-02-29 ASSESSMENT — PAIN DESCRIPTION - PAIN TYPE: TYPE: ACUTE PAIN

## 2024-02-29 ASSESSMENT — PAIN DESCRIPTION - LOCATION: LOCATION: ABDOMEN

## 2024-02-29 NOTE — CONSULTS
Reason for Consult:  Acute kidney injury.    Requesting Physician:  Josse Han DO    HISTORY OF PRESENT ILLNESS:    The patient is a 64 y.o. male who has history of CKD stage 3B presents with nausea, vomiting, diarrhea, poor appetite, cough and shortness of breath. On previous labs his serum creatinines have been between 1.8 to 1.9 with eGFR of 30-40s ml/min. On admission his creatinine was elevated at 4.3 that has improved to 3.7 today. His potassium was high at 5.3 with a CO2 level of 8. His ABG showed a pH of 7.026. Denies any difficulty with urination. Denies any recent use of NSAIDs or iv contrast.    Review Of Systems:   Constitutional: No fever, chills, lethargy, weakness or wt loss.  HEENT:  No headache, nasal discharge or sore throat.  Cardiac:  No chest pain, C/o dyspnea.  Chest:              No cough, phlegm or wheezing.  Abdomen:  C/o abdominal pain, nausea, vomiting or diarrhea.  Neuro:   No gross focal weakness, numbness, abnormal movements or seizure like activity.  Skin:   No rashes or itching.  :   No hematuria, pyuria, dysuria or flank pain.  Extremities:  No swelling or joint pains.  Endocrine: No polyuria, polydypsia, or thyroid problems.  Hematology:    No bleeding disorders, bruising or anemia.  All other ROS is negative.    Past Medical History:   Diagnosis Date    Acute blood loss anemia 01/05/2022    Arthritis     BILATERAL KNEESand right shoulder    Back pain     Cellulitis 07/14/2015    Colostomy RUQ 09/23/2020    Frequent headaches     10/28/2020 PATIENT STATES EVERY OTHER DAY.    Gastrointestinal hemorrhage with melena 01/05/2022    Gout     Hemorrhagic shock (HCC) 06/21/2023    Hernia of abdominal wall     History of atrial fibrillation     AFTER SURGERY ON 09/23/2020 WENT INTO A FIB. CONVERTED BACK TO NORMAL RHYTHM ON HIS OWN. CARDIOLOGIST DR STEIN    History of blood transfusion     NO REACTION    [place] (hard of hearing)     HTN     KSudhir Slaughter, CNP    Hyperkalemia

## 2024-02-29 NOTE — ED PROVIDER NOTES
making. The testing that was ordered was discussed with the patient. Any medications that may have been ordered were discussed with the patient    Code Status Discussion:  full code    \"ED Course\" Notes From Epic Narrator:  ED Course as of 02/29/24 1522   Thu Feb 29, 2024   1453 Patient discussed with cardiology in agreement to keep patient on heparin drip however no Cardizem drip at this time will monitor heart rate. [AS]      ED Course User Index  [AS] Kay Thapa MD         CRITICAL CARE:   CRITICAL CARE TIME     Due to the high probability of sudden and clinically significant deterioration in the patient's condition he required highest level of my preparedness to intervene urgently. I provided critical care time including documentation time, medication orders and management, reevaluation, vital sign assessment, ordering and reviewing of of lab tests ordering and reviewing of x-ray studies, and admission orders. Aggregate critical care time is  45 minutes including only time during which I was engaged in work directly related to his care and did not include time spent treating other patients simultaneously.        PROCEDURES:    Procedures      DATA FOR LAB AND RADIOLOGY TESTS ORDERED BELOW ARE REVIEWED BY THE ED CLINICIAN:    RADIOLOGY: All x-rays, CT, MRI, and formal ultrasound images (except ED bedside ultrasound) are read by the radiologist, see reports below, unless otherwise noted in MDM or here.  Reports below are reviewed by myself.  NM LUNG VENT/PERFUSION (VQ)   Final Result   Low probability for pulmonary embolism.         XR CHEST PORTABLE   Final Result   No acute process.             LABS: Lab orders shown below, the results are reviewed by myself, and all abnormals are listed below.  Labs Reviewed   COVID-19 & INFLUENZA COMBO - Abnormal; Notable for the following components:       Result Value    INFLUENZA A DETECTED (*)     All other components within normal limits   RESPIRATORY PANEL,

## 2024-02-29 NOTE — H&P
[facility]  Office: [phone]  Aung Luis DO, Mansoor Cline DO, Josse Han DO, Gregory Archibald DO, Andrew Roberts MD, Mayra Medel MD, Ray Johnson MD, Romy Amador MD,  Mic Meraz MD, Rom Hayes MD, Casandra Washington MD,  Ely Burks DO, Dorie Anderson MD, Marcos Travis MD, Trey Luis DO, Delia Clemens MD,  Jorge Major DO, Gisle Herbert MD, Rose Yi MD, Celia Guillen MD, Matthew Hyatt MD,  Sammy Galvez MD, Mary Kay Karimi MD, Edson Hay MD, Mateo Turner MD, Stan Cline MD, Radha Hoskins MD, Dick Lam DO, Peter Isbell DO, Giles Cat MD,  Eran Mckenzie MD, Shirley Waterhouse, CNP,  Missy Chowdhury CNP, Mikael Cai, CNP,  Shantelle Monson, DNP, Betty Boles, CNP, Yany Garcia, CNP, Zulay Ramsay CNP, Cathie Law CNP, Lynda Russell, CNP, An King, PA-C, Zoraida Sherman, PA-C, Gabby Vaughn, CNP, Floridalma Cook, CNP, Jaclyn Neff, CNP, Patsy Peterson, CNS, Yareli Montiel, CNP, Martha Prince, CNP, Tracy Schwab, CNP         [facility]   IN-PATIENT SERVICE   [facility]    HISTORY AND PHYSICAL EXAMINATION            Date:   2/29/2024  Patient name:  Prateek Denton II  Date of admission:  2/29/2024  8:24 AM  MRN:   [id number]  Account:  [id number]  YOB: 1960  PCP:    Lisseth Coello APRN - [facility]  Room:   [place] [address]  Code Status:    Prior    Chief Complaint:     Chief Complaint   Patient presents with    Shortness of Breath    Chest Pain       History Obtained From:     patient    History of Present Illness:     Prateek Denton II is a 64 y.o. Non- / non  male who presents with Shortness of Breath and Chest Pain   and is admitted to the hospital for the management of DEBBI (acute kidney injury) (HCC).    Patient says he started vomiting Sunday and has had chest pain, mid abdominal pain, and watery diarrhea, poor oral intake since Sunday. He has also had dizziness when

## 2024-02-29 NOTE — ACP (ADVANCE CARE PLANNING)
Advance Care Planning     Advance Care Planning Activator (Inpatient)  Conversation Note      Date of ACP Conversation: 2/29/2024     Conversation Conducted with: Patient with Decision Making Capacity    ACP Activator: Luisa Urbano [facility]        Health Care Decision Maker:     Current Designated Health Care Decision Maker:     Primary Decision Maker: Valencia Denton - [facility] - [phone]  Click here to complete Healthcare Decision Makers including section of the Healthcare Decision Maker Relationship (ie \"Primary\")  Today we documented Decision Maker(s) consistent with Legal Next of Kin hierarchy.    Care Preferences    Ventilation:  \"If you were in your present state of health and suddenly became very ill and were unable to breathe on your own, what would your preference be about the use of a ventilator (breathing machine) if it were available to you?\"      Would the patient desire the use of ventilator (breathing machine)?: yes    \"If your health worsens and it becomes clear that your chance of recovery is unlikely, what would your preference be about the use of a ventilator (breathing machine) if it were available to you?\"     Would the patient desire the use of ventilator (breathing machine)?: No      Resuscitation  \"CPR works best to restart the heart when there is a sudden event, like a heart attack, in someone who is otherwise healthy. Unfortunately, CPR does not typically restart the heart for people who have serious health conditions or who are very sick.\"    \"In the event your heart stopped as a result of an underlying serious health condition, would you want attempts to be made to restart your heart (answer \"yes\" for attempt to resuscitate) or would you prefer a natural death (answer \"no\" for do not attempt to resuscitate)?\" yes       [] Yes   [x] No   Educated Patient / Decision Maker regarding differences between Advance Directives and portable DNR orders.    Length of ACP Conversation in minutes:

## 2024-02-29 NOTE — CARE COORDINATION
Case Management Assessment  Initial Evaluation    Date/Time of Evaluation: 2/29/2024 1:37 PM  Assessment Completed by: REY Pascual    If patient is discharged prior to next notation, then this note serves as note for discharge by case management.    Patient Name: Prateek Denton II                   YOB: 1960  Diagnosis: DEBBI (acute kidney injury) (HCC) [N17.9]                   Date / Time: 2/29/2024  8:24 AM    Patient Admission Status: Inpatient   Readmission Risk (Low < 19, Mod (19-27), High > 27): Readmission Risk Score: 18.5    Current PCP: Lisseth Coello, APRN - CNP  PCP verified by CM? Yes    Chart Reviewed: Yes      History Provided by: Patient  Patient Orientation: Alert and Oriented    Patient Cognition: Alert    Hospitalization in the last 30 days (Readmission):  No    If yes, Readmission Assessment in CM Navigator will be completed.    Advance Directives:      Code Status: Prior   Patient's Primary Decision Maker is: Legal Next of Kin    Primary Decision Maker: Valencia Denton - Spouse - [phone]    Discharge Planning:    Patient lives with: Spouse/Significant Other Type of Home: House  Primary Care Giver: Self  Patient Support Systems include: Spouse/Significant Other, Children, Family Members   Current Financial resources: Medicare  Current community resources: None  Current services prior to admission: C-pap            Current DME:              Type of Home Care services:  None    ADLS  Prior functional level: Independent in ADLs/IADLs  Current functional level: Independent in ADLs/IADLs    PT AM-PAC:   /24  OT AM-PAC:   /24    Family can provide assistance at DC: Yes  Would you like Case Management to discuss the discharge plan with any other family members/significant others, and if so, who? No  Plans to Return to Present Housing: Yes  Other Identified Issues/Barriers to RETURNING to current housing: Kidney issues   Potential Assistance needed at discharge: N/A

## 2024-03-01 LAB
ALBUMIN SERPL-MCNC: 3.5 G/DL (ref 3.5–5.2)
ALP SERPL-CCNC: 84 U/L (ref 40–129)
ALT SERPL-CCNC: 33 U/L (ref 5–41)
ANION GAP SERPL CALCULATED.3IONS-SCNC: 15 MMOL/L (ref 9–17)
ANION GAP SERPL CALCULATED.3IONS-SCNC: 19 MMOL/L (ref 9–17)
ANTI-XA UNFRAC HEPARIN: 0.52 IU/L (ref 0.3–0.7)
ANTI-XA UNFRAC HEPARIN: 0.9 IU/L (ref 0.3–0.7)
ANTI-XA UNFRAC HEPARIN: >1.1 IU/L (ref 0.3–0.7)
AST SERPL-CCNC: 53 U/L
BILIRUB SERPL-MCNC: 0.2 MG/DL (ref 0.3–1.2)
BILIRUB UR QL STRIP: NEGATIVE
BUN SERPL-MCNC: 104 MG/DL (ref 8–23)
BUN SERPL-MCNC: 110 MG/DL (ref 8–23)
BUN/CREAT SERPL: 26 (ref 9–20)
BUN/CREAT SERPL: 27 (ref 9–20)
CALCIUM SERPL-MCNC: 8.4 MG/DL (ref 8.6–10.4)
CALCIUM SERPL-MCNC: 8.6 MG/DL (ref 8.6–10.4)
CASTS #/AREA URNS LPF: ABNORMAL /LPF
CHLORIDE SERPL-SCNC: 100 MMOL/L (ref 98–107)
CHLORIDE SERPL-SCNC: 106 MMOL/L (ref 98–107)
CLARITY UR: CLEAR
CO2 SERPL-SCNC: 12 MMOL/L (ref 20–31)
CO2 SERPL-SCNC: 13 MMOL/L (ref 20–31)
COLOR UR: YELLOW
CREAT SERPL-MCNC: 3.9 MG/DL (ref 0.7–1.2)
CREAT SERPL-MCNC: 4.2 MG/DL (ref 0.7–1.2)
DATE, STOOL #1: ABNORMAL
EKG ATRIAL RATE: 146 BPM
EKG P AXIS: 15 DEGREES
EKG P-R INTERVAL: 130 MS
EKG Q-T INTERVAL: 338 MS
EKG QRS DURATION: 72 MS
EKG QTC CALCULATION (BAZETT): 526 MS
EKG R AXIS: -9 DEGREES
EKG T AXIS: 63 DEGREES
EKG VENTRICULAR RATE: 146 BPM
EPI CELLS #/AREA URNS HPF: ABNORMAL /HPF (ref 0–5)
GFR SERPL CREATININE-BSD FRML MDRD: 15 ML/MIN/1.73M2
GFR SERPL CREATININE-BSD FRML MDRD: 16 ML/MIN/1.73M2
GLUCOSE BLD-MCNC: 228 MG/DL (ref 75–110)
GLUCOSE SERPL-MCNC: 197 MG/DL (ref 70–99)
GLUCOSE SERPL-MCNC: 245 MG/DL (ref 70–99)
GLUCOSE UR STRIP-MCNC: ABNORMAL MG/DL
HCT VFR BLD AUTO: 39.1 % (ref 40.7–50.3)
HEMOCCULT SP1 STL QL: POSITIVE
HGB BLD-MCNC: 13.3 G/DL (ref 13–17)
HGB UR QL STRIP.AUTO: ABNORMAL
KETONES UR STRIP-MCNC: NEGATIVE MG/DL
LEUKOCYTE ESTERASE UR QL STRIP: NEGATIVE
MAGNESIUM SERPL-MCNC: 1.9 MG/DL (ref 1.6–2.6)
MICROORGANISM SPEC CULT: NO GROWTH
NITRITE UR QL STRIP: NEGATIVE
PH UR STRIP: 5.5 [PH] (ref 5–8)
POTASSIUM SERPL-SCNC: 4.4 MMOL/L (ref 3.7–5.3)
POTASSIUM SERPL-SCNC: 5.6 MMOL/L (ref 3.7–5.3)
PROT SERPL-MCNC: 6.7 G/DL (ref 6.4–8.3)
PROT UR STRIP-MCNC: ABNORMAL MG/DL
RBC #/AREA URNS HPF: ABNORMAL /HPF (ref 0–2)
SODIUM SERPL-SCNC: 131 MMOL/L (ref 135–144)
SODIUM SERPL-SCNC: 134 MMOL/L (ref 135–144)
SP GR UR STRIP: 1.02 (ref 1–1.03)
SPECIMEN DESCRIPTION: NORMAL
TIME, STOOL #1: 1623
UROBILINOGEN UR STRIP-ACNC: NORMAL EU/DL (ref 0–1)
WBC #/AREA URNS HPF: ABNORMAL /HPF (ref 0–5)

## 2024-03-01 PROCEDURE — 94660 CPAP INITIATION&MGMT: CPT

## 2024-03-01 PROCEDURE — 6370000000 HC RX 637 (ALT 250 FOR IP): Performed by: INTERNAL MEDICINE

## 2024-03-01 PROCEDURE — 85520 HEPARIN ASSAY: CPT

## 2024-03-01 PROCEDURE — 6370000000 HC RX 637 (ALT 250 FOR IP): Performed by: NURSE PRACTITIONER

## 2024-03-01 PROCEDURE — 80048 BASIC METABOLIC PNL TOTAL CA: CPT

## 2024-03-01 PROCEDURE — 2580000003 HC RX 258: Performed by: INTERNAL MEDICINE

## 2024-03-01 PROCEDURE — 2500000003 HC RX 250 WO HCPCS: Performed by: INTERNAL MEDICINE

## 2024-03-01 PROCEDURE — 99232 SBSQ HOSP IP/OBS MODERATE 35: CPT | Performed by: INTERNAL MEDICINE

## 2024-03-01 PROCEDURE — 82270 OCCULT BLOOD FECES: CPT

## 2024-03-01 PROCEDURE — 85018 HEMOGLOBIN: CPT

## 2024-03-01 PROCEDURE — 36415 COLL VENOUS BLD VENIPUNCTURE: CPT

## 2024-03-01 PROCEDURE — 2580000003 HC RX 258: Performed by: NURSE PRACTITIONER

## 2024-03-01 PROCEDURE — 94761 N-INVAS EAR/PLS OXIMETRY MLT: CPT

## 2024-03-01 PROCEDURE — 5A09457 ASSISTANCE WITH RESPIRATORY VENTILATION, 24-96 CONSECUTIVE HOURS, CONTINUOUS POSITIVE AIRWAY PRESSURE: ICD-10-PCS | Performed by: FAMILY MEDICINE

## 2024-03-01 PROCEDURE — 6360000002 HC RX W HCPCS: Performed by: EMERGENCY MEDICINE

## 2024-03-01 PROCEDURE — 85014 HEMATOCRIT: CPT

## 2024-03-01 PROCEDURE — 82947 ASSAY GLUCOSE BLOOD QUANT: CPT

## 2024-03-01 PROCEDURE — 80053 COMPREHEN METABOLIC PANEL: CPT

## 2024-03-01 PROCEDURE — 2700000000 HC OXYGEN THERAPY PER DAY

## 2024-03-01 PROCEDURE — 83735 ASSAY OF MAGNESIUM: CPT

## 2024-03-01 PROCEDURE — 2060000000 HC ICU INTERMEDIATE R&B

## 2024-03-01 RX ORDER — LANOLIN ALCOHOL/MO/W.PET/CERES
325 CREAM (GRAM) TOPICAL DAILY
COMMUNITY

## 2024-03-01 RX ORDER — LANOLIN ALCOHOL/MO/W.PET/CERES
325 CREAM (GRAM) TOPICAL
Status: DISCONTINUED | OUTPATIENT
Start: 2024-03-02 | End: 2024-03-05 | Stop reason: HOSPADM

## 2024-03-01 RX ORDER — SODIUM POLYSTYRENE SULFONATE 15 G/60ML
45 SUSPENSION ORAL; RECTAL ONCE
Status: COMPLETED | OUTPATIENT
Start: 2024-03-01 | End: 2024-03-01

## 2024-03-01 RX ORDER — OXYCODONE HYDROCHLORIDE AND ACETAMINOPHEN 5; 325 MG/1; MG/1
1 TABLET ORAL ONCE
Status: COMPLETED | OUTPATIENT
Start: 2024-03-01 | End: 2024-03-01

## 2024-03-01 RX ADMIN — Medication 1 TABLET: at 08:10

## 2024-03-01 RX ADMIN — SODIUM BICARBONATE 1300 MG: 650 TABLET ORAL at 19:58

## 2024-03-01 RX ADMIN — OXYCODONE HYDROCHLORIDE AND ACETAMINOPHEN 1000 MG: 500 TABLET ORAL at 16:12

## 2024-03-01 RX ADMIN — FLUTICASONE PROPIONATE 2 SPRAY: 50 SPRAY, METERED NASAL at 08:12

## 2024-03-01 RX ADMIN — OXYCODONE HYDROCHLORIDE AND ACETAMINOPHEN 1000 MG: 500 TABLET ORAL at 19:58

## 2024-03-01 RX ADMIN — OXYCODONE HYDROCHLORIDE AND ACETAMINOPHEN 1000 MG: 500 TABLET ORAL at 08:10

## 2024-03-01 RX ADMIN — SODIUM POLYSTYRENE SULFONATE 45 G: 15 SUSPENSION ORAL; RECTAL at 08:10

## 2024-03-01 RX ADMIN — METOPROLOL 100 MG: 100 TABLET ORAL at 08:10

## 2024-03-01 RX ADMIN — HYDROXYZINE HYDROCHLORIDE 50 MG: 25 TABLET, FILM COATED ORAL at 19:58

## 2024-03-01 RX ADMIN — AZELASTINE HYDROCHLORIDE 2 SPRAY: 137 SPRAY, METERED NASAL at 08:12

## 2024-03-01 RX ADMIN — DULOXETINE HYDROCHLORIDE 20 MG: 20 CAPSULE, DELAYED RELEASE ORAL at 08:10

## 2024-03-01 RX ADMIN — SODIUM CHLORIDE, PRESERVATIVE FREE 10 ML: 5 INJECTION INTRAVENOUS at 20:02

## 2024-03-01 RX ADMIN — SODIUM BICARBONATE 1300 MG: 650 TABLET ORAL at 08:10

## 2024-03-01 RX ADMIN — OSELTAMIVIR PHOSPHATE 30 MG: 30 CAPSULE ORAL at 08:10

## 2024-03-01 RX ADMIN — AZELASTINE HYDROCHLORIDE 2 SPRAY: 137 SPRAY, METERED NASAL at 20:02

## 2024-03-01 RX ADMIN — DILTIAZEM HYDROCHLORIDE 60 MG: 60 TABLET, FILM COATED ORAL at 16:12

## 2024-03-01 RX ADMIN — FERROUS SULFATE TAB EC 325 MG (65 MG FE EQUIVALENT) 325 MG: 325 (65 FE) TABLET DELAYED RESPONSE at 08:10

## 2024-03-01 RX ADMIN — INSULIN LISPRO 1 UNITS: 100 INJECTION, SOLUTION INTRAVENOUS; SUBCUTANEOUS at 08:10

## 2024-03-01 RX ADMIN — FLUTICASONE PROPIONATE 2 SPRAY: 50 SPRAY, METERED NASAL at 19:57

## 2024-03-01 RX ADMIN — SODIUM BICARBONATE: 84 INJECTION, SOLUTION INTRAVENOUS at 21:26

## 2024-03-01 RX ADMIN — Medication 1 MG: at 21:15

## 2024-03-01 RX ADMIN — BENZONATATE 200 MG: 100 CAPSULE ORAL at 21:15

## 2024-03-01 RX ADMIN — HEPARIN SODIUM 14 UNITS/KG/HR: 10000 INJECTION, SOLUTION INTRAVENOUS at 02:02

## 2024-03-01 RX ADMIN — Medication 5 MG/HR: at 05:00

## 2024-03-01 RX ADMIN — METOPROLOL 100 MG: 100 TABLET ORAL at 19:58

## 2024-03-01 RX ADMIN — Medication 2000 UNITS: at 19:58

## 2024-03-01 RX ADMIN — OXYCODONE HYDROCHLORIDE AND ACETAMINOPHEN 1 TABLET: 5; 325 TABLET ORAL at 21:14

## 2024-03-01 RX ADMIN — SODIUM BICARBONATE 1300 MG: 650 TABLET ORAL at 16:12

## 2024-03-01 RX ADMIN — PRAVASTATIN SODIUM 20 MG: 20 TABLET ORAL at 08:10

## 2024-03-01 ASSESSMENT — PAIN DESCRIPTION - DESCRIPTORS: DESCRIPTORS: ACHING;SHARP

## 2024-03-01 ASSESSMENT — PAIN DESCRIPTION - ORIENTATION: ORIENTATION: MID;LOWER

## 2024-03-01 ASSESSMENT — PAIN SCALES - GENERAL
PAINLEVEL_OUTOF10: 3
PAINLEVEL_OUTOF10: 0
PAINLEVEL_OUTOF10: 8

## 2024-03-01 ASSESSMENT — PAIN - FUNCTIONAL ASSESSMENT: PAIN_FUNCTIONAL_ASSESSMENT: PREVENTS OR INTERFERES SOME ACTIVE ACTIVITIES AND ADLS

## 2024-03-01 ASSESSMENT — PAIN DESCRIPTION - LOCATION: LOCATION: BACK;ABDOMEN

## 2024-03-02 LAB
ANION GAP SERPL CALCULATED.3IONS-SCNC: 16 MMOL/L (ref 9–17)
BUN SERPL-MCNC: 120 MG/DL (ref 8–23)
BUN/CREAT SERPL: 29 (ref 9–20)
CALCIUM SERPL-MCNC: 8.5 MG/DL (ref 8.6–10.4)
CHLORIDE SERPL-SCNC: 104 MMOL/L (ref 98–107)
CO2 SERPL-SCNC: 14 MMOL/L (ref 20–31)
CREAT SERPL-MCNC: 4.1 MG/DL (ref 0.7–1.2)
ERYTHROCYTE [DISTWIDTH] IN BLOOD BY AUTOMATED COUNT: 13.8 % (ref 11.8–14.4)
GFR SERPL CREATININE-BSD FRML MDRD: 15 ML/MIN/1.73M2
GLUCOSE BLD-MCNC: 174 MG/DL (ref 75–110)
GLUCOSE BLD-MCNC: 207 MG/DL (ref 75–110)
GLUCOSE SERPL-MCNC: 168 MG/DL (ref 70–99)
HCT VFR BLD AUTO: 38.9 % (ref 40.7–50.3)
HGB BLD-MCNC: 12.4 G/DL (ref 13–17)
MAGNESIUM SERPL-MCNC: 1.9 MG/DL (ref 1.6–2.6)
MCH RBC QN AUTO: 29.7 PG (ref 25.2–33.5)
MCHC RBC AUTO-ENTMCNC: 31.9 G/DL (ref 28.4–34.8)
MCV RBC AUTO: 93.1 FL (ref 82.6–102.9)
NRBC BLD-RTO: 0 PER 100 WBC
PHOSPHATE SERPL-MCNC: 7.5 MG/DL (ref 2.5–4.5)
PLATELET # BLD AUTO: 190 K/UL (ref 138–453)
PMV BLD AUTO: 10.9 FL (ref 8.1–13.5)
POTASSIUM SERPL-SCNC: 4.4 MMOL/L (ref 3.7–5.3)
RBC # BLD AUTO: 4.18 M/UL (ref 4.21–5.77)
SODIUM SERPL-SCNC: 134 MMOL/L (ref 135–144)
WBC OTHER # BLD: 17 K/UL (ref 3.5–11.3)

## 2024-03-02 PROCEDURE — 36415 COLL VENOUS BLD VENIPUNCTURE: CPT

## 2024-03-02 PROCEDURE — 94660 CPAP INITIATION&MGMT: CPT

## 2024-03-02 PROCEDURE — 6370000000 HC RX 637 (ALT 250 FOR IP): Performed by: NURSE PRACTITIONER

## 2024-03-02 PROCEDURE — 83735 ASSAY OF MAGNESIUM: CPT

## 2024-03-02 PROCEDURE — 85027 COMPLETE CBC AUTOMATED: CPT

## 2024-03-02 PROCEDURE — 2500000003 HC RX 250 WO HCPCS: Performed by: INTERNAL MEDICINE

## 2024-03-02 PROCEDURE — 2700000000 HC OXYGEN THERAPY PER DAY

## 2024-03-02 PROCEDURE — 6370000000 HC RX 637 (ALT 250 FOR IP): Performed by: INTERNAL MEDICINE

## 2024-03-02 PROCEDURE — 84100 ASSAY OF PHOSPHORUS: CPT

## 2024-03-02 PROCEDURE — 2580000003 HC RX 258: Performed by: INTERNAL MEDICINE

## 2024-03-02 PROCEDURE — 94761 N-INVAS EAR/PLS OXIMETRY MLT: CPT

## 2024-03-02 PROCEDURE — 99232 SBSQ HOSP IP/OBS MODERATE 35: CPT | Performed by: INTERNAL MEDICINE

## 2024-03-02 PROCEDURE — 82947 ASSAY GLUCOSE BLOOD QUANT: CPT

## 2024-03-02 PROCEDURE — 2580000003 HC RX 258: Performed by: NURSE PRACTITIONER

## 2024-03-02 PROCEDURE — 80048 BASIC METABOLIC PNL TOTAL CA: CPT

## 2024-03-02 PROCEDURE — 87506 IADNA-DNA/RNA PROBE TQ 6-11: CPT

## 2024-03-02 PROCEDURE — 2060000000 HC ICU INTERMEDIATE R&B

## 2024-03-02 RX ORDER — HYDROCODONE BITARTRATE AND ACETAMINOPHEN 5; 325 MG/1; MG/1
1 TABLET ORAL EVERY 6 HOURS PRN
Status: DISCONTINUED | OUTPATIENT
Start: 2024-03-02 | End: 2024-03-05 | Stop reason: HOSPADM

## 2024-03-02 RX ADMIN — HYDROXYZINE HYDROCHLORIDE 50 MG: 25 TABLET, FILM COATED ORAL at 20:11

## 2024-03-02 RX ADMIN — Medication 2000 UNITS: at 20:11

## 2024-03-02 RX ADMIN — AZELASTINE HYDROCHLORIDE 2 SPRAY: 137 SPRAY, METERED NASAL at 20:13

## 2024-03-02 RX ADMIN — SODIUM CHLORIDE, PRESERVATIVE FREE 10 ML: 5 INJECTION INTRAVENOUS at 09:42

## 2024-03-02 RX ADMIN — Medication 1 TABLET: at 09:41

## 2024-03-02 RX ADMIN — ONDANSETRON 4 MG: 4 TABLET, ORALLY DISINTEGRATING ORAL at 17:30

## 2024-03-02 RX ADMIN — FLUTICASONE PROPIONATE 2 SPRAY: 50 SPRAY, METERED NASAL at 09:43

## 2024-03-02 RX ADMIN — PRAVASTATIN SODIUM 20 MG: 20 TABLET ORAL at 09:41

## 2024-03-02 RX ADMIN — OXYCODONE HYDROCHLORIDE AND ACETAMINOPHEN 1000 MG: 500 TABLET ORAL at 16:48

## 2024-03-02 RX ADMIN — METOPROLOL 100 MG: 100 TABLET ORAL at 09:41

## 2024-03-02 RX ADMIN — SODIUM BICARBONATE 1300 MG: 650 TABLET ORAL at 16:48

## 2024-03-02 RX ADMIN — SODIUM BICARBONATE 1300 MG: 650 TABLET ORAL at 20:11

## 2024-03-02 RX ADMIN — SODIUM BICARBONATE 1300 MG: 650 TABLET ORAL at 09:41

## 2024-03-02 RX ADMIN — FERROUS SULFATE TAB EC 325 MG (65 MG FE EQUIVALENT) 325 MG: 325 (65 FE) TABLET DELAYED RESPONSE at 09:41

## 2024-03-02 RX ADMIN — OSELTAMIVIR PHOSPHATE 30 MG: 30 CAPSULE ORAL at 09:41

## 2024-03-02 RX ADMIN — DULOXETINE HYDROCHLORIDE 20 MG: 20 CAPSULE, DELAYED RELEASE ORAL at 11:30

## 2024-03-02 RX ADMIN — DILTIAZEM HYDROCHLORIDE 60 MG: 60 TABLET, FILM COATED ORAL at 16:48

## 2024-03-02 RX ADMIN — DILTIAZEM HYDROCHLORIDE 60 MG: 60 TABLET, FILM COATED ORAL at 03:57

## 2024-03-02 RX ADMIN — BENZONATATE 200 MG: 100 CAPSULE ORAL at 10:09

## 2024-03-02 RX ADMIN — OXYCODONE HYDROCHLORIDE AND ACETAMINOPHEN 1000 MG: 500 TABLET ORAL at 20:11

## 2024-03-02 RX ADMIN — HYDROCODONE BITARTRATE AND ACETAMINOPHEN 1 TABLET: 5; 325 TABLET ORAL at 10:09

## 2024-03-02 RX ADMIN — AZELASTINE HYDROCHLORIDE 2 SPRAY: 137 SPRAY, METERED NASAL at 09:43

## 2024-03-02 RX ADMIN — HYDROCODONE BITARTRATE AND ACETAMINOPHEN 1 TABLET: 5; 325 TABLET ORAL at 20:10

## 2024-03-02 RX ADMIN — METOPROLOL 100 MG: 100 TABLET ORAL at 20:11

## 2024-03-02 RX ADMIN — FLUTICASONE PROPIONATE 2 SPRAY: 50 SPRAY, METERED NASAL at 20:13

## 2024-03-02 RX ADMIN — INSULIN LISPRO 1 UNITS: 100 INJECTION, SOLUTION INTRAVENOUS; SUBCUTANEOUS at 12:40

## 2024-03-02 RX ADMIN — SODIUM BICARBONATE: 84 INJECTION, SOLUTION INTRAVENOUS at 20:06

## 2024-03-02 RX ADMIN — OXYCODONE HYDROCHLORIDE AND ACETAMINOPHEN 1000 MG: 500 TABLET ORAL at 09:41

## 2024-03-02 ASSESSMENT — PAIN DESCRIPTION - ONSET
ONSET: ON-GOING
ONSET: ON-GOING

## 2024-03-02 ASSESSMENT — PAIN DESCRIPTION - PAIN TYPE
TYPE: ACUTE PAIN

## 2024-03-02 ASSESSMENT — PAIN DESCRIPTION - LOCATION
LOCATION: BACK;ABDOMEN
LOCATION: HIP

## 2024-03-02 ASSESSMENT — PAIN DESCRIPTION - DESCRIPTORS
DESCRIPTORS: DULL;SHARP
DESCRIPTORS: ACHING;SHARP

## 2024-03-02 ASSESSMENT — PAIN DESCRIPTION - ORIENTATION
ORIENTATION: RIGHT

## 2024-03-02 ASSESSMENT — PAIN SCALES - GENERAL
PAINLEVEL_OUTOF10: 5
PAINLEVEL_OUTOF10: 7
PAINLEVEL_OUTOF10: 8
PAINLEVEL_OUTOF10: 8

## 2024-03-02 ASSESSMENT — PAIN DESCRIPTION - FREQUENCY
FREQUENCY: CONTINUOUS
FREQUENCY: CONTINUOUS

## 2024-03-02 ASSESSMENT — PAIN - FUNCTIONAL ASSESSMENT
PAIN_FUNCTIONAL_ASSESSMENT: ACTIVITIES ARE NOT PREVENTED
PAIN_FUNCTIONAL_ASSESSMENT: PREVENTS OR INTERFERES SOME ACTIVE ACTIVITIES AND ADLS
PAIN_FUNCTIONAL_ASSESSMENT: ACTIVITIES ARE NOT PREVENTED

## 2024-03-02 NOTE — CARE COORDINATION
DC Planning    Spoke with pt at bedside, lives with wife, independent, drives. Declines dc needs. Was on 2l this  morning now on RA 95% resting. Watch for needs.

## 2024-03-03 LAB
ANION GAP SERPL CALCULATED.3IONS-SCNC: 12 MMOL/L (ref 9–17)
BUN SERPL-MCNC: 112 MG/DL (ref 8–23)
BUN/CREAT SERPL: 32 (ref 9–20)
CALCIUM SERPL-MCNC: 8.4 MG/DL (ref 8.6–10.4)
CAMPYLOBACTER DNA SPEC NAA+PROBE: NORMAL
CHLORIDE SERPL-SCNC: 102 MMOL/L (ref 98–107)
CO2 SERPL-SCNC: 23 MMOL/L (ref 20–31)
CREAT SERPL-MCNC: 3.5 MG/DL (ref 0.7–1.2)
ETEC ELTA+ESTB GENES STL QL NAA+PROBE: NORMAL
GFR SERPL CREATININE-BSD FRML MDRD: 18 ML/MIN/1.73M2
GLUCOSE BLD-MCNC: 159 MG/DL (ref 75–110)
GLUCOSE BLD-MCNC: 265 MG/DL (ref 75–110)
GLUCOSE SERPL-MCNC: 166 MG/DL (ref 70–99)
HCT VFR BLD AUTO: 33.6 % (ref 40.7–50.3)
HGB BLD-MCNC: 11.2 G/DL (ref 13–17)
MAGNESIUM SERPL-MCNC: 2.1 MG/DL (ref 1.6–2.6)
P SHIGELLOIDES DNA STL QL NAA+PROBE: NORMAL
PHOSPHATE SERPL-MCNC: 6.4 MG/DL (ref 2.5–4.5)
POTASSIUM SERPL-SCNC: 3.6 MMOL/L (ref 3.7–5.3)
SALMONELLA DNA SPEC QL NAA+PROBE: NORMAL
SHIGA TOXIN STX GENE SPEC NAA+PROBE: NORMAL
SHIGELLA DNA SPEC QL NAA+PROBE: NORMAL
SODIUM SERPL-SCNC: 137 MMOL/L (ref 135–144)
SPECIMEN DESCRIPTION: NORMAL
V CHOL+PARA RFBL+TRKH+TNAA STL QL NAA+PR: NORMAL
Y ENTERO RECN STL QL NAA+PROBE: NORMAL

## 2024-03-03 PROCEDURE — 2580000003 HC RX 258: Performed by: NURSE PRACTITIONER

## 2024-03-03 PROCEDURE — 82947 ASSAY GLUCOSE BLOOD QUANT: CPT

## 2024-03-03 PROCEDURE — 6370000000 HC RX 637 (ALT 250 FOR IP): Performed by: NURSE PRACTITIONER

## 2024-03-03 PROCEDURE — 99232 SBSQ HOSP IP/OBS MODERATE 35: CPT | Performed by: INTERNAL MEDICINE

## 2024-03-03 PROCEDURE — 94660 CPAP INITIATION&MGMT: CPT

## 2024-03-03 PROCEDURE — 83735 ASSAY OF MAGNESIUM: CPT

## 2024-03-03 PROCEDURE — 85018 HEMOGLOBIN: CPT

## 2024-03-03 PROCEDURE — 6370000000 HC RX 637 (ALT 250 FOR IP): Performed by: INTERNAL MEDICINE

## 2024-03-03 PROCEDURE — 84100 ASSAY OF PHOSPHORUS: CPT

## 2024-03-03 PROCEDURE — 85014 HEMATOCRIT: CPT

## 2024-03-03 PROCEDURE — 36415 COLL VENOUS BLD VENIPUNCTURE: CPT

## 2024-03-03 PROCEDURE — 2060000000 HC ICU INTERMEDIATE R&B

## 2024-03-03 PROCEDURE — 80048 BASIC METABOLIC PNL TOTAL CA: CPT

## 2024-03-03 PROCEDURE — 2580000003 HC RX 258: Performed by: INTERNAL MEDICINE

## 2024-03-03 PROCEDURE — 94761 N-INVAS EAR/PLS OXIMETRY MLT: CPT

## 2024-03-03 PROCEDURE — 2700000000 HC OXYGEN THERAPY PER DAY

## 2024-03-03 RX ORDER — SODIUM CHLORIDE 9 MG/ML
INJECTION, SOLUTION INTRAVENOUS CONTINUOUS
Status: DISCONTINUED | OUTPATIENT
Start: 2024-03-03 | End: 2024-03-05 | Stop reason: HOSPADM

## 2024-03-03 RX ORDER — POTASSIUM CHLORIDE 20 MEQ/1
20 TABLET, EXTENDED RELEASE ORAL ONCE
Status: COMPLETED | OUTPATIENT
Start: 2024-03-03 | End: 2024-03-03

## 2024-03-03 RX ADMIN — POTASSIUM CHLORIDE 20 MEQ: 1500 TABLET, EXTENDED RELEASE ORAL at 08:33

## 2024-03-03 RX ADMIN — AZELASTINE HYDROCHLORIDE 2 SPRAY: 137 SPRAY, METERED NASAL at 20:31

## 2024-03-03 RX ADMIN — METOPROLOL 100 MG: 100 TABLET ORAL at 20:21

## 2024-03-03 RX ADMIN — HYDROXYZINE HYDROCHLORIDE 50 MG: 25 TABLET, FILM COATED ORAL at 20:21

## 2024-03-03 RX ADMIN — OXYCODONE HYDROCHLORIDE AND ACETAMINOPHEN 1000 MG: 500 TABLET ORAL at 20:20

## 2024-03-03 RX ADMIN — SODIUM CHLORIDE, PRESERVATIVE FREE 10 ML: 5 INJECTION INTRAVENOUS at 08:46

## 2024-03-03 RX ADMIN — AZELASTINE HYDROCHLORIDE 2 SPRAY: 137 SPRAY, METERED NASAL at 08:20

## 2024-03-03 RX ADMIN — PRAVASTATIN SODIUM 20 MG: 20 TABLET ORAL at 08:15

## 2024-03-03 RX ADMIN — SODIUM CHLORIDE, PRESERVATIVE FREE 10 ML: 5 INJECTION INTRAVENOUS at 08:16

## 2024-03-03 RX ADMIN — OXYCODONE HYDROCHLORIDE AND ACETAMINOPHEN 1000 MG: 500 TABLET ORAL at 08:15

## 2024-03-03 RX ADMIN — DILTIAZEM HYDROCHLORIDE 60 MG: 60 TABLET, FILM COATED ORAL at 17:25

## 2024-03-03 RX ADMIN — METOPROLOL 100 MG: 100 TABLET ORAL at 08:15

## 2024-03-03 RX ADMIN — DULOXETINE HYDROCHLORIDE 20 MG: 20 CAPSULE, DELAYED RELEASE ORAL at 08:15

## 2024-03-03 RX ADMIN — HYDROCODONE BITARTRATE AND ACETAMINOPHEN 1 TABLET: 5; 325 TABLET ORAL at 10:56

## 2024-03-03 RX ADMIN — Medication 1 MG: at 20:48

## 2024-03-03 RX ADMIN — OXYCODONE HYDROCHLORIDE AND ACETAMINOPHEN 1000 MG: 500 TABLET ORAL at 14:33

## 2024-03-03 RX ADMIN — FLUTICASONE PROPIONATE 2 SPRAY: 50 SPRAY, METERED NASAL at 20:31

## 2024-03-03 RX ADMIN — FERROUS SULFATE TAB EC 325 MG (65 MG FE EQUIVALENT) 325 MG: 325 (65 FE) TABLET DELAYED RESPONSE at 08:16

## 2024-03-03 RX ADMIN — SODIUM BICARBONATE 1300 MG: 650 TABLET ORAL at 08:16

## 2024-03-03 RX ADMIN — SODIUM CHLORIDE: 9 INJECTION, SOLUTION INTRAVENOUS at 08:46

## 2024-03-03 RX ADMIN — INSULIN LISPRO 2 UNITS: 100 INJECTION, SOLUTION INTRAVENOUS; SUBCUTANEOUS at 17:25

## 2024-03-03 RX ADMIN — SODIUM BICARBONATE 1300 MG: 650 TABLET ORAL at 14:33

## 2024-03-03 RX ADMIN — DILTIAZEM HYDROCHLORIDE 60 MG: 60 TABLET, FILM COATED ORAL at 04:29

## 2024-03-03 RX ADMIN — FLUTICASONE PROPIONATE 2 SPRAY: 50 SPRAY, METERED NASAL at 08:20

## 2024-03-03 RX ADMIN — Medication 2000 UNITS: at 20:20

## 2024-03-03 RX ADMIN — SODIUM BICARBONATE 1300 MG: 650 TABLET ORAL at 20:20

## 2024-03-03 RX ADMIN — Medication 1 TABLET: at 08:15

## 2024-03-03 RX ADMIN — HYDROCODONE BITARTRATE AND ACETAMINOPHEN 1 TABLET: 5; 325 TABLET ORAL at 22:55

## 2024-03-03 RX ADMIN — OSELTAMIVIR PHOSPHATE 30 MG: 30 CAPSULE ORAL at 08:15

## 2024-03-03 ASSESSMENT — PAIN DESCRIPTION - LOCATION
LOCATION: HIP
LOCATION: HIP
LOCATION: BACK
LOCATION: HIP

## 2024-03-03 ASSESSMENT — PAIN SCALES - GENERAL
PAINLEVEL_OUTOF10: 8
PAINLEVEL_OUTOF10: 6
PAINLEVEL_OUTOF10: 6
PAINLEVEL_OUTOF10: 5

## 2024-03-03 ASSESSMENT — PAIN DESCRIPTION - DESCRIPTORS
DESCRIPTORS: DULL;ACHING;SHARP

## 2024-03-03 ASSESSMENT — PAIN DESCRIPTION - ORIENTATION
ORIENTATION: RIGHT

## 2024-03-03 ASSESSMENT — PAIN DESCRIPTION - PAIN TYPE
TYPE: ACUTE PAIN

## 2024-03-04 LAB
ANION GAP SERPL CALCULATED.3IONS-SCNC: 11 MMOL/L (ref 9–17)
BASOPHILS # BLD: <0.03 K/UL (ref 0–0.2)
BASOPHILS NFR BLD: 0 % (ref 0–2)
BUN SERPL-MCNC: 94 MG/DL (ref 8–23)
BUN/CREAT SERPL: 31 (ref 9–20)
CALCIUM SERPL-MCNC: 8.2 MG/DL (ref 8.6–10.4)
CHLORIDE SERPL-SCNC: 104 MMOL/L (ref 98–107)
CO2 SERPL-SCNC: 24 MMOL/L (ref 20–31)
CREAT SERPL-MCNC: 3 MG/DL (ref 0.7–1.2)
EKG ATRIAL RATE: 278 BPM
EKG P AXIS: -90 DEGREES
EKG Q-T INTERVAL: 352 MS
EKG QRS DURATION: 162 MS
EKG QTC CALCULATION (BAZETT): 535 MS
EKG R AXIS: -13 DEGREES
EKG T AXIS: -62 DEGREES
EKG VENTRICULAR RATE: 139 BPM
EOSINOPHIL # BLD: 0.13 K/UL (ref 0–0.44)
EOSINOPHILS RELATIVE PERCENT: 2 % (ref 1–4)
ERYTHROCYTE [DISTWIDTH] IN BLOOD BY AUTOMATED COUNT: 13 % (ref 11.8–14.4)
GFR SERPL CREATININE-BSD FRML MDRD: 22 ML/MIN/1.73M2
GLUCOSE BLD-MCNC: 138 MG/DL (ref 75–110)
GLUCOSE SERPL-MCNC: 138 MG/DL (ref 70–99)
HCT VFR BLD AUTO: 29.8 % (ref 40.7–50.3)
HGB BLD-MCNC: 10.1 G/DL (ref 13–17)
IMM GRANULOCYTES # BLD AUTO: 0.11 K/UL (ref 0–0.3)
IMM GRANULOCYTES NFR BLD: 1 %
LYMPHOCYTES NFR BLD: 1.34 K/UL (ref 1.1–3.7)
LYMPHOCYTES RELATIVE PERCENT: 16 % (ref 24–43)
MAGNESIUM SERPL-MCNC: 1.9 MG/DL (ref 1.6–2.6)
MCH RBC QN AUTO: 30.1 PG (ref 25.2–33.5)
MCHC RBC AUTO-ENTMCNC: 33.9 G/DL (ref 28.4–34.8)
MCV RBC AUTO: 88.7 FL (ref 82.6–102.9)
MONOCYTES NFR BLD: 0.9 K/UL (ref 0.1–1.2)
MONOCYTES NFR BLD: 10 % (ref 3–12)
NEUTROPHILS NFR BLD: 71 % (ref 36–65)
NEUTS SEG NFR BLD: 6.13 K/UL (ref 1.5–8.1)
NRBC BLD-RTO: 0 PER 100 WBC
PHOSPHATE SERPL-MCNC: 4.8 MG/DL (ref 2.5–4.5)
PLATELET # BLD AUTO: 145 K/UL (ref 138–453)
PMV BLD AUTO: 11 FL (ref 8.1–13.5)
POTASSIUM SERPL-SCNC: 3.8 MMOL/L (ref 3.7–5.3)
RBC # BLD AUTO: 3.36 M/UL (ref 4.21–5.77)
SODIUM SERPL-SCNC: 139 MMOL/L (ref 135–144)
WBC OTHER # BLD: 8.6 K/UL (ref 3.5–11.3)

## 2024-03-04 PROCEDURE — 84100 ASSAY OF PHOSPHORUS: CPT

## 2024-03-04 PROCEDURE — 6370000000 HC RX 637 (ALT 250 FOR IP): Performed by: NURSE PRACTITIONER

## 2024-03-04 PROCEDURE — 2060000000 HC ICU INTERMEDIATE R&B

## 2024-03-04 PROCEDURE — 97530 THERAPEUTIC ACTIVITIES: CPT

## 2024-03-04 PROCEDURE — 94761 N-INVAS EAR/PLS OXIMETRY MLT: CPT

## 2024-03-04 PROCEDURE — 80048 BASIC METABOLIC PNL TOTAL CA: CPT

## 2024-03-04 PROCEDURE — 6370000000 HC RX 637 (ALT 250 FOR IP): Performed by: INTERNAL MEDICINE

## 2024-03-04 PROCEDURE — 97161 PT EVAL LOW COMPLEX 20 MIN: CPT

## 2024-03-04 PROCEDURE — 94660 CPAP INITIATION&MGMT: CPT

## 2024-03-04 PROCEDURE — 36415 COLL VENOUS BLD VENIPUNCTURE: CPT

## 2024-03-04 PROCEDURE — 83735 ASSAY OF MAGNESIUM: CPT

## 2024-03-04 PROCEDURE — 99232 SBSQ HOSP IP/OBS MODERATE 35: CPT | Performed by: INTERNAL MEDICINE

## 2024-03-04 PROCEDURE — 97165 OT EVAL LOW COMPLEX 30 MIN: CPT

## 2024-03-04 PROCEDURE — 2700000000 HC OXYGEN THERAPY PER DAY

## 2024-03-04 PROCEDURE — 85025 COMPLETE CBC W/AUTO DIFF WBC: CPT

## 2024-03-04 PROCEDURE — 82947 ASSAY GLUCOSE BLOOD QUANT: CPT

## 2024-03-04 RX ORDER — SODIUM BICARBONATE 650 MG/1
1300 TABLET ORAL 2 TIMES DAILY
Status: DISCONTINUED | OUTPATIENT
Start: 2024-03-04 | End: 2024-03-05

## 2024-03-04 RX ADMIN — ONDANSETRON 4 MG: 4 TABLET, ORALLY DISINTEGRATING ORAL at 10:06

## 2024-03-04 RX ADMIN — OXYCODONE HYDROCHLORIDE AND ACETAMINOPHEN 1000 MG: 500 TABLET ORAL at 14:03

## 2024-03-04 RX ADMIN — FLUTICASONE PROPIONATE 2 SPRAY: 50 SPRAY, METERED NASAL at 08:09

## 2024-03-04 RX ADMIN — Medication 1 TABLET: at 08:07

## 2024-03-04 RX ADMIN — DILTIAZEM HYDROCHLORIDE 60 MG: 60 TABLET, FILM COATED ORAL at 04:08

## 2024-03-04 RX ADMIN — OXYCODONE HYDROCHLORIDE AND ACETAMINOPHEN 1000 MG: 500 TABLET ORAL at 08:08

## 2024-03-04 RX ADMIN — HYDROCODONE BITARTRATE AND ACETAMINOPHEN 1 TABLET: 5; 325 TABLET ORAL at 21:32

## 2024-03-04 RX ADMIN — SODIUM BICARBONATE 1300 MG: 650 TABLET ORAL at 21:33

## 2024-03-04 RX ADMIN — SODIUM BICARBONATE 1300 MG: 650 TABLET ORAL at 08:08

## 2024-03-04 RX ADMIN — OXYCODONE HYDROCHLORIDE AND ACETAMINOPHEN 1000 MG: 500 TABLET ORAL at 21:33

## 2024-03-04 RX ADMIN — Medication 1 MG: at 21:44

## 2024-03-04 RX ADMIN — Medication 2000 UNITS: at 21:33

## 2024-03-04 RX ADMIN — AZELASTINE HYDROCHLORIDE 2 SPRAY: 137 SPRAY, METERED NASAL at 08:11

## 2024-03-04 RX ADMIN — FLUTICASONE PROPIONATE 2 SPRAY: 50 SPRAY, METERED NASAL at 21:34

## 2024-03-04 RX ADMIN — METOPROLOL 100 MG: 100 TABLET ORAL at 21:33

## 2024-03-04 RX ADMIN — PRAVASTATIN SODIUM 20 MG: 20 TABLET ORAL at 08:08

## 2024-03-04 RX ADMIN — AZELASTINE HYDROCHLORIDE 2 SPRAY: 137 SPRAY, METERED NASAL at 21:33

## 2024-03-04 RX ADMIN — HYDROXYZINE HYDROCHLORIDE 50 MG: 25 TABLET, FILM COATED ORAL at 21:33

## 2024-03-04 RX ADMIN — FERROUS SULFATE TAB EC 325 MG (65 MG FE EQUIVALENT) 325 MG: 325 (65 FE) TABLET DELAYED RESPONSE at 08:08

## 2024-03-04 RX ADMIN — DILTIAZEM HYDROCHLORIDE 60 MG: 60 TABLET, FILM COATED ORAL at 15:41

## 2024-03-04 RX ADMIN — HYDROCODONE BITARTRATE AND ACETAMINOPHEN 1 TABLET: 5; 325 TABLET ORAL at 14:03

## 2024-03-04 RX ADMIN — METOPROLOL 100 MG: 100 TABLET ORAL at 08:07

## 2024-03-04 RX ADMIN — DULOXETINE HYDROCHLORIDE 20 MG: 20 CAPSULE, DELAYED RELEASE ORAL at 08:08

## 2024-03-04 RX ADMIN — OSELTAMIVIR PHOSPHATE 30 MG: 30 CAPSULE ORAL at 08:08

## 2024-03-04 ASSESSMENT — PAIN SCALES - GENERAL
PAINLEVEL_OUTOF10: 5
PAINLEVEL_OUTOF10: 7
PAINLEVEL_OUTOF10: 3
PAINLEVEL_OUTOF10: 7

## 2024-03-04 ASSESSMENT — PAIN DESCRIPTION - ORIENTATION
ORIENTATION: RIGHT

## 2024-03-04 ASSESSMENT — PAIN DESCRIPTION - LOCATION
LOCATION: HIP

## 2024-03-04 ASSESSMENT — PAIN DESCRIPTION - DESCRIPTORS
DESCRIPTORS: DISCOMFORT;SHARP

## 2024-03-04 ASSESSMENT — PAIN - FUNCTIONAL ASSESSMENT
PAIN_FUNCTIONAL_ASSESSMENT: ACTIVITIES ARE NOT PREVENTED
PAIN_FUNCTIONAL_ASSESSMENT: ACTIVITIES ARE NOT PREVENTED
PAIN_FUNCTIONAL_ASSESSMENT: PREVENTS OR INTERFERES SOME ACTIVE ACTIVITIES AND ADLS

## 2024-03-05 VITALS
RESPIRATION RATE: 18 BRPM | HEART RATE: 79 BPM | SYSTOLIC BLOOD PRESSURE: 142 MMHG | BODY MASS INDEX: 36.24 KG/M2 | OXYGEN SATURATION: 94 % | TEMPERATURE: 98.4 F | DIASTOLIC BLOOD PRESSURE: 83 MMHG | WEIGHT: 258.9 LBS | HEIGHT: 71 IN

## 2024-03-05 LAB
ANION GAP SERPL CALCULATED.3IONS-SCNC: 13 MMOL/L (ref 9–17)
BUN SERPL-MCNC: 74 MG/DL (ref 8–23)
BUN/CREAT SERPL: 28 (ref 9–20)
CALCIUM SERPL-MCNC: 8.1 MG/DL (ref 8.6–10.4)
CHLORIDE SERPL-SCNC: 102 MMOL/L (ref 98–107)
CO2 SERPL-SCNC: 23 MMOL/L (ref 20–31)
CREAT SERPL-MCNC: 2.6 MG/DL (ref 0.7–1.2)
GFR SERPL CREATININE-BSD FRML MDRD: 26 ML/MIN/1.73M2
GLUCOSE BLD-MCNC: 135 MG/DL (ref 75–110)
GLUCOSE BLD-MCNC: 155 MG/DL (ref 75–110)
GLUCOSE BLD-MCNC: 159 MG/DL (ref 75–110)
GLUCOSE BLD-MCNC: 163 MG/DL (ref 75–110)
GLUCOSE BLD-MCNC: 170 MG/DL (ref 75–110)
GLUCOSE BLD-MCNC: 179 MG/DL (ref 75–110)
GLUCOSE BLD-MCNC: 180 MG/DL (ref 75–110)
GLUCOSE BLD-MCNC: 189 MG/DL (ref 75–110)
GLUCOSE BLD-MCNC: 191 MG/DL (ref 75–110)
GLUCOSE BLD-MCNC: 197 MG/DL (ref 75–110)
GLUCOSE BLD-MCNC: 250 MG/DL (ref 75–110)
GLUCOSE SERPL-MCNC: 140 MG/DL (ref 70–99)
MAGNESIUM SERPL-MCNC: 1.8 MG/DL (ref 1.6–2.6)
MICROORGANISM SPEC CULT: NORMAL
MICROORGANISM SPEC CULT: NORMAL
PHOSPHATE SERPL-MCNC: 3.8 MG/DL (ref 2.5–4.5)
POTASSIUM SERPL-SCNC: 3.7 MMOL/L (ref 3.7–5.3)
SERVICE CMNT-IMP: NORMAL
SERVICE CMNT-IMP: NORMAL
SODIUM SERPL-SCNC: 138 MMOL/L (ref 135–144)
SPECIMEN DESCRIPTION: NORMAL
SPECIMEN DESCRIPTION: NORMAL

## 2024-03-05 PROCEDURE — 94660 CPAP INITIATION&MGMT: CPT

## 2024-03-05 PROCEDURE — 84100 ASSAY OF PHOSPHORUS: CPT

## 2024-03-05 PROCEDURE — 2700000000 HC OXYGEN THERAPY PER DAY

## 2024-03-05 PROCEDURE — 80048 BASIC METABOLIC PNL TOTAL CA: CPT

## 2024-03-05 PROCEDURE — 6370000000 HC RX 637 (ALT 250 FOR IP): Performed by: INTERNAL MEDICINE

## 2024-03-05 PROCEDURE — 6370000000 HC RX 637 (ALT 250 FOR IP): Performed by: NURSE PRACTITIONER

## 2024-03-05 PROCEDURE — 94761 N-INVAS EAR/PLS OXIMETRY MLT: CPT

## 2024-03-05 PROCEDURE — 99238 HOSP IP/OBS DSCHRG MGMT 30/<: CPT | Performed by: FAMILY MEDICINE

## 2024-03-05 PROCEDURE — 2580000003 HC RX 258: Performed by: NURSE PRACTITIONER

## 2024-03-05 PROCEDURE — 36415 COLL VENOUS BLD VENIPUNCTURE: CPT

## 2024-03-05 PROCEDURE — 83735 ASSAY OF MAGNESIUM: CPT

## 2024-03-05 RX ORDER — SODIUM BICARBONATE 650 MG/1
650 TABLET ORAL 2 TIMES DAILY
Qty: 60 TABLET | Refills: 1 | Status: SHIPPED | OUTPATIENT
Start: 2024-03-05

## 2024-03-05 RX ORDER — FOLIC ACID/VIT B COMPLEX AND C 0.8 MG
1 TABLET ORAL DAILY
Qty: 30 TABLET | Refills: 1 | Status: SHIPPED | OUTPATIENT
Start: 2024-03-06

## 2024-03-05 RX ORDER — SODIUM BICARBONATE 650 MG/1
650 TABLET ORAL 2 TIMES DAILY
Status: DISCONTINUED | OUTPATIENT
Start: 2024-03-05 | End: 2024-03-05 | Stop reason: HOSPADM

## 2024-03-05 RX ORDER — TRIAMCINOLONE ACETONIDE 1 MG/G
CREAM TOPICAL
Qty: 80 G | Refills: 1 | Status: SHIPPED | OUTPATIENT
Start: 2024-03-05

## 2024-03-05 RX ADMIN — OXYCODONE HYDROCHLORIDE AND ACETAMINOPHEN 1000 MG: 500 TABLET ORAL at 08:30

## 2024-03-05 RX ADMIN — SODIUM CHLORIDE, PRESERVATIVE FREE 10 ML: 5 INJECTION INTRAVENOUS at 08:27

## 2024-03-05 RX ADMIN — FERROUS SULFATE TAB EC 325 MG (65 MG FE EQUIVALENT) 325 MG: 325 (65 FE) TABLET DELAYED RESPONSE at 08:30

## 2024-03-05 RX ADMIN — DILTIAZEM HYDROCHLORIDE 60 MG: 60 TABLET, FILM COATED ORAL at 04:02

## 2024-03-05 RX ADMIN — METOPROLOL 100 MG: 100 TABLET ORAL at 08:29

## 2024-03-05 RX ADMIN — DULOXETINE HYDROCHLORIDE 20 MG: 20 CAPSULE, DELAYED RELEASE ORAL at 08:30

## 2024-03-05 RX ADMIN — SODIUM BICARBONATE 1300 MG: 650 TABLET ORAL at 08:30

## 2024-03-05 RX ADMIN — Medication 1 TABLET: at 08:30

## 2024-03-05 RX ADMIN — FLUTICASONE PROPIONATE 2 SPRAY: 50 SPRAY, METERED NASAL at 08:32

## 2024-03-05 RX ADMIN — AZELASTINE HYDROCHLORIDE 2 SPRAY: 137 SPRAY, METERED NASAL at 08:32

## 2024-03-05 RX ADMIN — PRAVASTATIN SODIUM 20 MG: 20 TABLET ORAL at 08:30

## 2024-03-05 ASSESSMENT — ENCOUNTER SYMPTOMS
RHINORRHEA: 0
CHEST TIGHTNESS: 0
VOMITING: 0
SINUS PRESSURE: 0
SHORTNESS OF BREATH: 0
DIARRHEA: 0
ABDOMINAL PAIN: 0
CONSTIPATION: 0
BACK PAIN: 0
COUGH: 0
VOICE CHANGE: 0
NAUSEA: 0
CHOKING: 0
WHEEZING: 0

## 2024-03-05 ASSESSMENT — PAIN SCALES - GENERAL
PAINLEVEL_OUTOF10: 6
PAINLEVEL_OUTOF10: 2
PAINLEVEL_OUTOF10: 3

## 2024-03-05 NOTE — CARE COORDINATION
Discharge planning    Chart reviewed. Patient lives with wife, independent and drives.     Admitted for DEBBI and FLU A. Nephrology following. On 2 L. At 99%. Watch home 02 needs.

## 2024-03-05 NOTE — PLAN OF CARE
Andrew resting fairly well this shift with report of abdominal and back pain at HS r/t cough. Gave one-time Percocet and heat to good effect. He is normal sinus on telemetry; gave scheduled Metoprolol and Cardizem. He is able to ambulate with SBA and is doing well. Expiratory wheeze noted in LLL. He is maintaining saturations >90% on RA. Pt is afebrile with influenza. Vitals WNL. Glucose levels WNL and did not require sliding scale coverage at HS. No s/s of hyper or hypoglycemia. Pt has call light in reach and is using appropriately; safety maintained.     Problem: Pain  Goal: Verbalizes/displays adequate comfort level or baseline comfort level  Outcome: Progressing  Flowsheets (Taken 3/1/2024 2114)  Verbalizes/displays adequate comfort level or baseline comfort level:   Encourage patient to monitor pain and request assistance   Assess pain using appropriate pain scale   Administer analgesics based on type and severity of pain and evaluate response   Notify Licensed Independent Practitioner if interventions unsuccessful or patient reports new pain     Problem: Safety - Adult  Goal: Free from fall injury  Outcome: Progressing     Problem: Respiratory - Adult  Goal: Achieves optimal ventilation and oxygenation  Outcome: Progressing  Flowsheets (Taken 3/1/2024 2007)  Achieves optimal ventilation and oxygenation:   Assess for changes in respiratory status   Assess for changes in mentation and behavior   Position to facilitate oxygenation and minimize respiratory effort   Respiratory therapy support as indicated     Problem: Cardiovascular - Adult  Goal: Maintains optimal cardiac output and hemodynamic stability  Outcome: Progressing  Flowsheets (Taken 3/1/2024 2007)  Maintains optimal cardiac output and hemodynamic stability:   Monitor blood pressure and heart rate   Assess for signs of decreased cardiac output  Goal: Absence of cardiac dysrhythmias or at baseline  Outcome: Progressing  Flowsheets (Taken 3/1/2024 
Andrew resting fairly well this shift with report of abdominal pain earlier. Gave one-time morphine to good effect. Notified Dr. Barnes of BMP results and sodium bicarb adjusted. Notified Dr. Moreno of EKG results and HR in 140s and reviewed medications and hx of current stay with him. He ordered increase to 15mg/hr on Cardizem drip and to change to Amiodarone if there was no change in an hour. Dr. Han ordered C-pap, and HR started to come down over the course of the hour. He is now at 5mg/hr and continues to be normal sinus in the 70s. Heparin drip running per order and has been reduced x2 this shift. He stood at bedside to use the urinal and was very unsteady on his feet. Have encouraged him to use urinal without standing at this time. Lung sounds diminished with expiratory wheeze noted. Pt is afebrile with influenza. Vitals WNL. Glucose levels WNL and did not require sliding scale coverage at HS. No s/s of hyper or hypoglycemia. Pt has call light in reach and is using appropriately; safety maintained.    Problem: Pain  Goal: Verbalizes/displays adequate comfort level or baseline comfort level  Outcome: Progressing  Flowsheets (Taken 2/29/2024 2141)  Verbalizes/displays adequate comfort level or baseline comfort level:   Encourage patient to monitor pain and request assistance   Assess pain using appropriate pain scale   Administer analgesics based on type and severity of pain and evaluate response     Problem: Safety - Adult  Goal: Free from fall injury  Outcome: Progressing     Problem: Respiratory - Adult  Goal: Achieves optimal ventilation and oxygenation  Outcome: Progressing  Flowsheets (Taken 2/29/2024 1930)  Achieves optimal ventilation and oxygenation:   Assess for changes in respiratory status   Assess for changes in mentation and behavior   Position to facilitate oxygenation and minimize respiratory effort   Oxygen supplementation based on oxygen saturation or arterial blood gases   Respiratory therapy 
D/c plan likely home, unsure of needs as of now. Pt remains A&O x 4, on 2L. Pt voided 920 this am. Dr. Barnes concerned for retention, requested pt to void and PVR bladder scan completed. No new s/s of skin breakdown or injury. Safety protocols in place and enforced. Pt ambulates SBA.Cardizem gtt d/c this am, pt in NSR in the 80s. Dr. Barnes aware of K of 5.4 this am, see orders for more details. Hep gtt continued, Anti Xa continue to be drawn q6 hrs until 2 therapeutic results. Writer offered to get pt up to chair today, pt refused.     Problem: Discharge Planning  Goal: Discharge to home or other facility with appropriate resources  Outcome: Progressing     Problem: Chronic Conditions and Co-morbidities  Goal: Patient's chronic conditions and co-morbidity symptoms are monitored and maintained or improved  Outcome: Progressing     Problem: Pain  Goal: Verbalizes/displays adequate comfort level or baseline comfort level  3/1/2024 1253 by Jaclyn Salmeron RN  Outcome: Progressing     Problem: Safety - Adult  Goal: Free from fall injury  3/1/2024 1253 by Jaclyn Salmeron RN  Outcome: Progressing     Problem: ABCDS Injury Assessment  Goal: Absence of physical injury  Outcome: Progressing     Problem: Respiratory - Adult  Goal: Achieves optimal ventilation and oxygenation  3/1/2024 1253 by Jaclyn Salmeron RN  Outcome: Progressing     Problem: Cardiovascular - Adult  Goal: Maintains optimal cardiac output and hemodynamic stability  3/1/2024 1253 by Jaclyn Salmeron RN  Outcome: Progressing     Problem: Cardiovascular - Adult  Goal: Absence of cardiac dysrhythmias or at baseline  3/1/2024 1253 by Jaclyn Salmeron RN  Outcome: Progressing     Problem: Musculoskeletal - Adult  Goal: Return ADL status to a safe level of function  3/1/2024 1253 by Jaclyn Salmeron RN  Outcome: Progressing     Problem: Infection - Adult  Goal: Absence of infection at discharge  3/1/2024 1253 by Jaclyn Salmeron RN  Outcome: Progressing     Problem: 
Patient of Dr. Cagle - please defer consult to him       Electronically signed by TORREY Mendoza NP on 3/1/24 at 7:29 AM EST   
Pt RA during the day, Bipap at night.   Droplet precautions remain in place for the flu. Pt on tamifllu.   Labs being monitored. Creatinine improving. Pt receiving 20ml/hr NS.   Pt sinus rhythm on monitor. VSS.   Problem: Safety - Adult  Goal: Free from fall injury  Outcome: Progressing     Problem: Respiratory - Adult  Goal: Achieves optimal ventilation and oxygenation  Outcome: Progressing  Flowsheets  Taken 3/5/2024 0240 by Ave Hoffman RCP  Achieves optimal ventilation and oxygenation:   Assess for changes in respiratory status   Oxygen supplementation based on oxygen saturation or arterial blood gases   Respiratory therapy support as indicated  Taken 3/4/2024 2245 by Ave Hoffman RCP  Achieves optimal ventilation and oxygenation:   Assess for changes in respiratory status   Oxygen supplementation based on oxygen saturation or arterial blood gases   Respiratory therapy support as indicated     Problem: Cardiovascular - Adult  Goal: Maintains optimal cardiac output and hemodynamic stability  Outcome: Progressing     Problem: Discharge Planning  Goal: Discharge to home or other facility with appropriate resources  Outcome: Progressing     
Pt admitted for DEBBI. 1400mL out overnight for urine output. NS running at 50. PRN Norco given once overnight. Wore bipap for most of night, RA otherwise with no issues desatting. Still trending creat and BUN at this time. Vitals stable and safety maintained.     Problem: Pain  Goal: Verbalizes/displays adequate comfort level or baseline comfort level  3/4/2024 0438 by Aiyana Velazquez, RN  Outcome: Progressing    Problem: Respiratory - Adult  Goal: Achieves optimal ventilation and oxygenation  3/4/2024 0438 by Aiyana Velazquez, RN  Outcome: Progressing  Achieves optimal ventilation and oxygenation:   Assess for changes in respiratory status   Assess for changes in mentation and behavior   Oxygen supplementation based on oxygen saturation or arterial blood gases    Problem: Metabolic/Fluid and Electrolytes - Adult  Goal: Electrolytes maintained within normal limits  Outcome: Progressing  Goal: Hemodynamic stability and optimal renal function maintained  Outcome: Progressing  Goal: Glucose maintained within prescribed range  3/4/2024 0438 by Aiyana Velazquez, RN  Outcome: Progressing  Glucose maintained within prescribed range:   Monitor blood glucose as ordered   Assess for signs and symptoms of hyperglycemia and hypoglycemia   Administer ordered medications to maintain glucose within target range     
Pt has been resting comfortably in bed. He did complain of pain to the right hip, PRN med meds given. Pt did experience nausea at the end of the shift, PRN zofran given. All questions and concerns have been addressed. Bed is locked and in the lowest position with the call light in reach. Safety maintained.     Problem: Discharge Planning  Goal: Discharge to home or other facility with appropriate resources  3/2/2024 2002 by Sarah Ellis RN  Outcome: Progressing     Problem: Chronic Conditions and Co-morbidities  Goal: Patient's chronic conditions and co-morbidity symptoms are monitored and maintained or improved  3/2/2024 2002 by Sarah Ellis RN  Outcome: Progressing     Problem: Pain  Goal: Verbalizes/displays adequate comfort level or baseline comfort level  3/2/2024 2002 by Sarah Ellis RN  Outcome: Progressing     Problem: Safety - Adult  Goal: Free from fall injury  3/2/2024 2002 by Sarah Ellis RN  Outcome: Progressing     Problem: Respiratory - Adult  Goal: Achieves optimal ventilation and oxygenation  Recent Flowsheet Documentation  Taken 3/2/2024 0800 by Sarah Ellis RN  Achieves optimal ventilation and oxygenation: Assess for changes in respiratory status     Problem: Cardiovascular - Adult  Goal: Maintains optimal cardiac output and hemodynamic stability  Recent Flowsheet Documentation  Taken 3/2/2024 0800 by Sarah Ellis RN  Maintains optimal cardiac output and hemodynamic stability: Monitor blood pressure and heart rate     Problem: Musculoskeletal - Adult  Goal: Return ADL status to a safe level of function  Recent Flowsheet Documentation  Taken 3/2/2024 0800 by Sarah Ellis RN  Return ADL Status to a Safe Level of Function: Administer medication as ordered     Problem: Cardiovascular - Adult  Goal: Absence of cardiac dysrhythmias or at baseline  Recent Flowsheet Documentation  Taken 3/2/2024 0800 by Sarah Ellis RN  Absence of cardiac dysrhythmias or at baseline: Monitor cardiac rate and rhythm     
Pt remains safe and free from falls thus far in shift  NS decreased from 50 ml/hr to 20 ml/hr  Kidney function is improving, possible discharge tomorrow   Norco given for hip pain, pt satisfied  Zofran given to pt d/t feeling nauseous   Call light in reach   Bed locked and lowest position  Pt calls out appropriately  Care ongoing  Problem: Discharge Planning  Goal: Discharge to home or other facility with appropriate resources  3/4/2024 1056 by Carol Garcia RN  Outcome: Progressing     Problem: Chronic Conditions and Co-morbidities  Goal: Patient's chronic conditions and co-morbidity symptoms are monitored and maintained or improved  Outcome: Progressing     Problem: Pain  Goal: Verbalizes/displays adequate comfort level or baseline comfort level  3/4/2024 1056 by Carol Garcia RN  Outcome: Progressing     Problem: Safety - Adult  Goal: Free from fall injury  3/4/2024 1056 by Carol Garcia RN  Outcome: Progressing     Problem: ABCDS Injury Assessment  Goal: Absence of physical injury  Outcome: Progressing     Problem: Cardiovascular - Adult  Goal: Maintains optimal cardiac output and hemodynamic stability  3/4/2024 1056 by Carol Garcia RN  Outcome: Progressing     Problem: Infection - Adult  Goal: Absence of infection at discharge  3/4/2024 1056 by Carol Garcia RN  Outcome: Progressing     Problem: Metabolic/Fluid and Electrolytes - Adult  Goal: Electrolytes maintained within normal limits  3/4/2024 1056 by Carol Garcia RN  Outcome: Progressing     
Pt resting comfortably in bed, pt is A&OX4. Pt gets up one assist to the bathroom. Pt's pain in right hip has been managed with Norco to a tolerable level. Sodium bicarb was DC'd, new orders of NS, running at 50ml/hr. Pt's blood sugar has been maintained with no coverage needed. Bed is locked, in lowest position with the call light in reach. Safety has been maintained. All questions and concerns have been addressed.    Problem: Discharge Planning  Goal: Discharge to home or other facility with appropriate resources  Outcome: Progressing  Flowsheets (Taken 3/3/2024 0800)  Discharge to home or other facility with appropriate resources: Identify barriers to discharge with patient and caregiver     Problem: Pain  Goal: Verbalizes/displays adequate comfort level or baseline comfort level  Outcome: Progressing     Problem: Safety - Adult  Goal: Free from fall injury  Outcome: Progressing     Problem: Respiratory - Adult  Goal: Achieves optimal ventilation and oxygenation  Outcome: Progressing  Flowsheets (Taken 3/3/2024 0800)  Achieves optimal ventilation and oxygenation:   Assess for changes in respiratory status   Assess for changes in mentation and behavior   Oxygen supplementation based on oxygen saturation or arterial blood gases     Problem: Cardiovascular - Adult  Goal: Maintains optimal cardiac output and hemodynamic stability  Outcome: Progressing  Flowsheets (Taken 3/3/2024 0800)  Maintains optimal cardiac output and hemodynamic stability:   Monitor blood pressure and heart rate   Administer fluid and/or volume expanders as ordered     Problem: Cardiovascular - Adult  Goal: Absence of cardiac dysrhythmias or at baseline  Outcome: Progressing  Flowsheets (Taken 3/3/2024 0800)  Absence of cardiac dysrhythmias or at baseline:   Monitor cardiac rate and rhythm   Assess for signs of decreased cardiac output     Problem: Metabolic/Fluid and Electrolytes - Adult  Goal: Glucose maintained within prescribed 
Metabolic/Fluid and Electrolytes - Adult  Goal: Hemodynamic stability and optimal renal function maintained  Outcome: Progressing     Problem: Metabolic/Fluid and Electrolytes - Adult  Goal: Glucose maintained within prescribed range  Outcome: Progressing

## 2024-03-05 NOTE — PROGRESS NOTES
Physician Progress Note      PATIENT:               LAW REBOLLEDO  CSN #:                  [id number]  :                       1960  ADMIT DATE:       2024 8:24 AM  DISCH DATE:  RESPONDING  PROVIDER #:        Josse Han DO          QUERY TEXT:    Pt admitted with SOB and Influenza A. Pt noted to have WBC 24.2, HR up to 165   and RR 18-24. If possible, please document in the progress notes and discharge   summary if you are evaluating and /or treating any of the following:    The medical record reflects the following:    Risk Factors: Influenza A  Clinical Indicators: WBC 24.2, no lactic, no PCT, no CRP, HR up to 165, no   fever, RR 18-24  Treatment: IVF boluses, Tamiflu    Thank you!    Jozef Anthony, BSN,RN, CRCR  RN Clinical   Options provided:  -- Sepsis, present on admission  -- Influenza A infection without Sepsis  -- Other - I will add my own diagnosis  -- Disagree - Not applicable / Not valid  -- Disagree - Clinically unable to determine / Unknown  -- Refer to Clinical Documentation Reviewer    PROVIDER RESPONSE TEXT:    This patient has sepsis which was present on admission.    Query created by: Jozef Anthony on 3/1/2024 12:44 PM      Electronically signed by:  Josse Han DO 3/1/2024 12:47 PM          
Admitted from ER /PCU to room 1002-2.   Pt alert and oriented. Patient placed in droplet plus isolation for flu+.  Pt oriented to call light system and agreeable to call for assistance.    Family at bed side  Vital signs recorded    Telemetry monitor applied.   Admission documentation completed  Home Meds reviewed and marked in progress. Med rec pharmacist consulted.    
[facility]  Office: [phone]  Aung Luis DO, Mansoor Cline DO, Josse Han DO, Gregory Archibald DO, Andrew Roberts MD, Mayra Medel MD, Ray Johnson MD, Romy Amador MD,  Mic Meraz MD, Rom Hayes MD, Casandra Washington MD,  Ely Burks DO, Dorie Anderson MD, Marcos Travis MD, Trey Luis DO, Delia Clemens MD,  Jorge Major DO, Gisel Herbert MD, Rose Yi MD, Celia Guillen MD, Matthew Hyatt MD,  Sammy Galvez MD, Mary Kay Karimi MD, Edson Hay MD, Mateo Turner MD, Stan Cline MD, Radha Hoskins MD, Dick Lam DO, Peter Isbell DO, Giles Cat MD,  Eran Mckenzie MD, Shirley Waterhouse, CNP,  Missy Chowdhury CNP, Mikael Cai, CNP,  Shantelle Monson, DNP, Betty Boles, CNP, Yany Garcia, CNP, Zulay Ramsay CNP, Cathie Law CNP, Lynda Russell, CNP, An King, PA-C, Zoraida Sherman, PA-C, Gabby Vaughn, CNP, Floridalma Cook, CNP, Jaclyn Neff, CNP, Patsy Peterson, CNS, Yareli Montiel, CNP, Martha Prince CNP, Tracy Schwab, CNP         [facility]   IN-PATIENT SERVICE   [facility]    Progress Note    3/3/2024    8:40 AM    Name:   Prateek Denton II  MRN:     [id number]     Acct:      [id number]   Room:   1002/1002-02   Day:  3  Admit Date:  2/29/2024  8:24 AM    PCP:   Lisseth Coello, TORREY - CNP  Code Status:  Full Code    Subjective:     C/C:   Chief Complaint   Patient presents with    Shortness of Breath    Chest Pain     Interval History Status: improved.     Patient reports decreasing hip pain.  Denies any complaints of chest pain, nausea or vomiting, fevers or chills.  Shortness of breath continues to improve.    Brief History:     This is a 64-year-old male that presents with a complaint of shortness of breath and chest pressure after having several days of diarrhea, abdominal pain and dry heaves at home.  Upon evaluation he is found to have DEBBI and be tachycardic.    Review of Systems: 
[facility]  Office: [phone]  Aung Luis DO, Mansoor Cline DO, Josse Han DO, Gregory Archibald DO, Andrew Roberts MD, Mayra Medel MD, Ray Johnson MD, Romy Amador MD,  Mic Meraz MD, Rom Hayes MD, Casandra Washington MD,  Ely Burks DO, Dorie Anderson MD, Marcos Travis MD, Trey Luis DO, Delia Clemens MD,  Jorge Major DO, Gisel Herbert MD, Rose Yi MD, Celia Guillen MD, Matthew Hyatt MD,  Sammy Galvez MD, Mary Kay Karimi MD, Edson Hay MD, Mateo Turner MD, Stan Cline MD, Radha Hoskins MD, Dick Lam DO, Peter Isbell DO, Giles Cat MD,  Eran Mckenzie MD, Shirley Waterhouse, CNP,  Missy Chowdhury CNP, Mikael Cai, CNP,  Shantelle Monson, KIMI, Betty Boles, CNP, Yany Garcia, CNP, Zulay Ramsay CNP, Cathie Law, CNP, Lynda Russell, CNP, An King, PA-C, Zoraida Sherman, PA-C, Gabby Vaughn, CNP, Floridalma Cook, CNP, Jaclyn Neff, CNP, Patsy Peterson, CNS, Yareli Montiel, CNP, Martha Prince, CNP, Tracy Schwab, CNP       [facility]      Daily Progress Note     Admit Date: 2/29/2024  Bed/Room No.  1002/1002-02  Admitting Physician : Ray Johnson MD  Code Status :Full Code  Hospital Day:  LOS: 5 days   Chief Complaint:     Chief Complaint   Patient presents with    Shortness of Breath    Chest Pain     Principal Problem:    DEBBI (acute kidney injury) (HCC)  Active Problems:    Essential hypertension    Class 2 severe obesity with serious comorbidity and body mass index (BMI) of 36.0 to 36.9 in adult (HCC)    CKD (chronic kidney disease) stage 4, GFR 15-29 ml/min (HCC)    Type 2 diabetes mellitus with diabetic nephropathy, with long-term current use of insulin (HCC)    Influenza A    Hyperkalemia    Tachycardia    Elevated d-dimer  Resolved Problems:    * No resolved hospital problems. *    Subjective :        Interval History/Significant events :  03/05/24    Patient is complaining of rash in left 
Critical labs called to Dr. Barnes/Clive. ([facility]). Orders placed to continue bicarb drip. BMP ordered for 2000. Heparin anti xa critical >1.10. Heparin gtt placed on 60 minute delay. To restart after sixty minutes and decrease by 3 u/k/h passed on to night shift RN.   
Dr. Barnes concerned for enlarged bladder on US, he verbally requested while rounding to do a bladder scan. Writer educated pt on bladder scan and requested to try to void. Pt voided 450, PVR was hard to obtain, only visualized 55mL PV, d/t very large hernia. Dr. Barnes at bedside during procedure and ordered to straight cath, if >200 place hunter. Writer educated pt on straight cath procedure pt tolerated well. Writer only obtained 55mL during straight cath procedure.   
Infusion verify completed at 0824 was not given by writer, it had not been completed by a prior RN. It was stated in report these had not been completed by an ED RN. Infusion verification must be completed to be counted in I&O during length of stay. this  
Occupational Therapy  Facility/Department: [facility] PROGRESSIVE CARE  Occupational Therapy Initial Assessment    Name: Prateek Denton II  : 1960  MRN: [id number]  Date of Service: 3/4/2024    RN reports patient is medically stable for therapy treatment this date.    Chart reviewed prior to treatment and patient is agreeable for therapy.  All lines intact and patient positioned comfortably at end of treatment.  All patient needs addressed prior to ending therapy session.      Discharge Recommendations:  Defer OT at this time  OT Equipment Recommendations  Equipment Needed: No    PER HPI: This is a 64-year-old male that presents with a complaint of shortness of breath and chest pressure after having several days of diarrhea, abdominal pain and dry heaves at home. Upon evaluation he is found to have DEBBI and be tachycardic.     Patient Diagnosis(es): The primary encounter diagnosis was Influenza. Diagnoses of DEBBI (acute kidney injury) (HCC), Hyperkalemia, and Tachycardia were also pertinent to this visit.  Past Medical History:  has a past medical history of Acute blood loss anemia, Arthritis, Back pain, Cellulitis, Colostomy RUQ, Frequent headaches, Gastrointestinal hemorrhage with melena, Gout, Hemorrhagic shock (HCC), Hernia of abdominal wall, History of atrial fibrillation, History of blood transfusion, [place] (hard of hearing), HTN, Hyperkalemia, Hyperlipidemia, Incisional hernia, Kidney infection, Large bowel obstruction (HCC), Morbid obesity due to excess calories (HCC), SBO (small bowel obstruction) (HCC), Sepsis (HCC), Single kidney, Sleep apnea, T2DM, Tooth missing, Under care of team, Upper GI bleed, Wears glasses, and Wellness examination.  Past Surgical History:  has a past surgical history that includes Ankle surgery (Right, ); Carpal tunnel release (Bilateral); Cystoscopy (Right, 2020); hc cath power picc triple (2020); Kidney surgery (Left, 2020); Small intestine surgery (N/A, 
[facility]  Office: [phone]  Aung Luis DO, Mansoor Cline, DO, Josse Han DO, Gregory Archibald, DO, Andrew Roberts MD, Mayra Medel MD, Ray Johnson MD, Romy Amador MD,  Mic Meraz MD, Rom Hayes MD, Casandra Washington MD,  Ely Burks DO, Dorie Anderson MD, Marcos Travis MD, Trey Luis DO, Delia Clemens MD,  Jorge Major DO, Gisel Herbert MD, Rose Yi MD, Celia Guillen MD, Matthew Hyatt MD,  Sammy Galvez MD, Mary Kay Karimi MD, Edson Hay MD, Mateo Turner MD, Stan Cline MD, Radha Hoskins MD, Dick Lam DO, Peter Isbell DO, Giles Cat MD,  Eran Mckenzie MD, Shirley Waterhouse, CNP,  Missy Chowdhury CNP, Mikael Cai, CNP,  Shantelle Monson, DNP, Betty Boles, CNP, Yany Garcia, CNP, Zulay Ramsay CNP, Cathie Law CNP, Lynda Russell, CNP, An King, PA-C, Zoraida Sherman, PA-C, Gabby Vaughn, CNP, Floridalma Cook, CNP, Jaclyn Neff, CNP, Patsy Peterson, CNS, Yareli Montiel, CNP, Martha Prince CNP, Tracy Schwab, CNP         [facility]   IN-PATIENT SERVICE   [facility]    Progress Note    3/1/2024    8:17 AM    Name:   Prateek Denton II  MRN:     [id number]     Acct:      [id number]   Room:   1002/1002-02   Day:  1  Admit Date:  2/29/2024  8:24 AM    PCP:   Lisseth Coello, TORREY - CNP  Code Status:  Full Code    Subjective:     C/C:   Chief Complaint   Patient presents with    Shortness of Breath    Chest Pain     Interval History Status: improved.     Patient is resting in bed and reports decreasing shortness of breath.  Denies any chest pain, nausea or vomiting, fevers or chills or other acute complaints.  Diarrhea and dry heaves resolved    Brief History:     This is a 64-year-old male that presents with a complaint of shortness of breath and chest pressure after having several days of diarrhea, abdominal pain and dry heaves at home.  Upon evaluation he is found to have DEBBI and be 
Physical Therapy  Facility/Department: [facility] CARE  Physical Therapy Initial Assessment    Name: Prateek Denton II  : 1960  MRN: [id number]  Date of Service: 3/4/2024  CARMELO Medina reports patient is medically stable for therapy treatment this date.    Chart reviewed prior to treatment and patient is agreeable for therapy.  All lines intact and patient positioned comfortably at end of treatment.  All patient needs addressed prior to ending therapy session.      Per H&P: \"Prateek Denton II is a 64 y.o. Non- / non  male who presents with Shortness of Breath and Chest Pain and is admitted to the hospital for the management of DEBBI (acute kidney injury) (HCC).  Patient says he started vomiting  and has had chest pain, mid abdominal pain, and watery diarrhea, poor oral intake since . He has also had dizziness when standing, cough. He saw a provider on Tuesday for these symptoms and he was given doxycycline and prednisone. He has a PMH of HTN, DM II, single kidney, abdominal surgeries, and LISA.\"      Patient Diagnosis(es): The primary encounter diagnosis was Influenza. Diagnoses of DEBBI (acute kidney injury) (HCC), Hyperkalemia, and Tachycardia were also pertinent to this visit.  Past Medical History:  has a past medical history of Acute blood loss anemia, Arthritis, Back pain, Cellulitis, Colostomy RUQ, Frequent headaches, Gastrointestinal hemorrhage with melena, Gout, Hemorrhagic shock (HCC), Hernia of abdominal wall, History of atrial fibrillation, History of blood transfusion, [place] (hard of hearing), HTN, Hyperkalemia, Hyperlipidemia, Incisional hernia, Kidney infection, Large bowel obstruction (HCC), Morbid obesity due to excess calories (HCC), SBO (small bowel obstruction) (HCC), Sepsis (HCC), Single kidney, Sleep apnea, T2DM, Tooth missing, Under care of team, Upper GI bleed, Wears glasses, and Wellness examination.  Past Surgical History:  has a past surgical history that 
Pt remained calm and cooperative throughout shift, wore bipap at night. Remained on RA when off bipap. Bicarb gtt continues running at 50 mL/hr. Pt received PRN norco at 2010, 1 tab. Pt used urinal at bedside. Will continue with care plan.  
Pt's wife arrived to  patient.  On dressing for discharge a large rash was noted under his upper left arm with matching rash under left armpit.  Pt had not noticed this previously.  He had no itching, no burning, or discomfort to this site.  Dr. IDRIS Johnson notified and asked to assess .  Wife is very impatient and angry at this development.  Pt was seen by Dr. Johnson, who states wife c/o neither bedding or gown changed during admission.  This was not brought to my attention previously by her or patient.  He was wheeled out by wife .   
Reason for Follow up: DEBBI.    HISTORY:    No new complaints. His diarrhea has resolved but is not hungry today.     Review Of Systems:   Constitutional: No fever, chills, lethargy, weakness or wt loss.  Cardiac:  No chest pain, dyspnea, orthopnea or PND.  Pulmonary:  No cough, phlegm or wheezing.  Abdomen:  As above.  :   No hematuria, pyuria, dysuria or flank pain.  Extremities:  No swelling or joint pains.    Scheduled Meds:   ferrous sulfate  325 mg Oral Daily with breakfast    azelastine  2 spray Each Nostril BID    Vitamin D  2,000 Units Oral Daily    dilTIAZem  60 mg Oral Q12H    DULoxetine  20 mg Oral Daily    fluticasone  2 spray Nasal BID    hydrOXYzine HCl  50 mg Oral Nightly    [Held by provider] lisinopril  5 mg Oral Daily    metoprolol  100 mg Oral BID    pravastatin  20 mg Oral Daily    vitamin C  1,000 mg Oral TID    sodium chloride flush  5-40 mL IntraVENous 2 times per day    oseltamivir  30 mg Oral Daily    insulin lispro  0-4 Units SubCUTAneous TID WC    insulin lispro  0-4 Units SubCUTAneous Nightly    sodium bicarbonate  1,300 mg Oral TID    deon-donn  1 tablet Oral Daily   Continuous Infusions:   sodium chloride 50 mL/hr at 03/03/24 0846    sodium chloride      dextrose Stopped (03/01/24 0930)     PRN Meds:HYDROcodone 5 mg - acetaminophen, albuterol sulfate HFA, benzonatate, melatonin ER, sodium chloride flush, sodium chloride, ondansetron **OR** ondansetron, acetaminophen **OR** acetaminophen, glucose, dextrose bolus **OR** dextrose bolus, glucagon (rDNA), dextrose, levalbuterol, sodium chloride nebulizer    Allergies   Allergen Reactions    Ampicillin Swelling     Swelling of throat.     Pcn [Penicillins] Swelling     Throat swelling.  Tolerated cefepime during 8/31/20 admission.    Sulfa Antibiotics      Other reaction(s): Unknown    Tape [Adhesive Tape] Other (See Comments)     CAUSES REDNESS. PAPER TAPE OK.       Physical Exam:  Blood pressure (!) 159/78, pulse 78, temperature 98.4 °F 
Reviewed all discharge instructions and f/u with patient.  He never gets a flu shot d/t concerns of side effects r/t one kidney.   
SPIRITUAL CARE DEPARTMENT [facility]  PROGRESS NOTE    Room # 1002/1002-02   Name: Prateek Denton II              Reason for visit: Routine    I visited the patient.    Admit Date & Time: 2/29/2024  8:24 AM    Assessment:  Prateek Denton II is a 64 y.o. male.  Due to isolation,  unable to visit.  leaves POA documents with PT: . For possible follow up.      Intervention:   provided a ministry presence and brief prayer.    Outcome:  Patient did not respond.    Plan:  Chaplains will remain available to offer spiritual and emotional support as needed.    Electronically signed by Chaplain Bambi, on 3/1/2024 at 10:49 AM.  Spiritual Care Department  [facility]      03/01/24 1047   Encounter Summary   Service Provided For: Patient not available   Referral/Consult From: Other    Support System Unknown   Last Encounter  03/01/24   Complexity of Encounter Low   Begin Time 0800   End Time  0802   Total Time Calculated 2 min   Advance Care Planning   Type ACP conversation   Assessment/Intervention/Outcome   Assessment Unable to assess   Intervention Prayer (assurance of)/[place];Sustaining Presence/Ministry of presence       
[facility]  Office: [phone]  Aung Luis DO, Mansoor Cline, DO, Jsose Han DO, Gregory Archibald, DO, Andrew Roberts MD, Mayra Medel MD, Ray Johnson MD, Romy Amador MD,  Mic Meraz MD, Rom Hayes MD, Casandra Washington MD,  Ely Burks DO, Dorie Anderson MD, Marcos Travis MD, Trey Luis DO, Delia Clemens MD,  Jorge Major DO, Gisel Herbert MD, Rose Yi MD, Celia Guillen MD, Matthew Hyatt MD,  Sammy Galvez MD, Mary Kay Karimi MD, Edson Hay MD, Mateo Turner MD, Stan Cline MD, Radha Hoskins MD, Dick Lam DO, Peter Isbell DO, Giles Cat MD,  Eran Mckenzie MD, Shirley Waterhouse, CNP,  Missy Chowdhury CNP, Mikael Cai, CNP,  Shantelle Monson, DNP, Betty Boles, CNP, Yany Garcia, CNP, Zulay Ramsay CNP, Cathie Law CNP, Lynda Russell, CNP, An King, PA-C, Zoraida Sherman, PA-C, Gabby Vaughn, CNP, Floridalma Cook, CNP, Jaclyn Neff, CNP, Patsy Peterson, CNS, Yareli Montiel, CNP, Martha Prince, CNP, Tracy Schwab, CNP         [facility]   IN-PATIENT SERVICE   [facility]    Progress Note    3/2/2024    9:46 AM    Name:   Prateek Denton II  MRN:     [id number]     Acct:      [id number]   Room:   1002/1002-02   Day:  2  Admit Date:  2/29/2024  8:24 AM    PCP:   Lisseth Coello APRN - CNP  Code Status:  Full Code    Subjective:     C/C:   Chief Complaint   Patient presents with    Shortness of Breath    Chest Pain     Interval History Status: improved.     Patient is resting in bed, complains of mild left hip pain, points to the sacroiliac area.  Denies any chest pain, shortness of breath, nausea or vomiting.  3 episodes of diarrhea yesterday after receiving Kayexalate, now resolved    Brief History:     This is a 64-year-old male that presents with a complaint of shortness of breath and chest pressure after having several days of diarrhea, abdominal pain and dry heaves at home.  Upon evaluation he 
Transitions of Care Pharmacy Service   Medication Review    The patient's list of current home medications has been reviewed.     Source(s) of information: Patient, [facility]     Based on information provided by the above source(s), I have updated the patient's home med list as described below.     Please review the ACTION REQUESTED section of this note below for any discrepancies on current hospital orders.      I changed or updated the following medications on the patient's home medication list:  Removed Allopurinol  Doxycycline  Methyprednisolone     Adjusted   Ferrous Sulfate  Vitamin E         PROVIDER ACTION REQUESTED  Medications that need to be addressed by a physician/nurse practitioner:    Action Requested   Pt does not take allopurinol at home, can this be d/c'd?  Pt only takes Ferrous sulfate once daily, can this be adjusted?         Please feel free to call me with any questions about this encounter. Thank you.    Tulio Flores RPH   Transitions of Care Pharmacy Service  Phone:  [phone]  Fax: [phone]      Electronically signed by Tulio Flores [facility] on 3/1/2024 at 7:16 PM         Medications Prior to Admission: ferrous sulfate (FE TABS 325) 325 (65 Fe) MG EC tablet, Take 1 tablet by mouth daily  albuterol sulfate HFA (VENTOLIN HFA) 108 (90 Base) MCG/ACT inhaler, Inhale 2 puffs into the lungs 4 times daily as needed for Wheezing or Shortness of Breath  benzonatate (TESSALON) 200 MG capsule, Take 1 capsule by mouth 3 times daily as needed for Cough  lisinopril (PRINIVIL;ZESTRIL) 5 MG tablet, Take 1 tablet by mouth daily  fluticasone (FLONASE) 50 MCG/ACT nasal spray, 2 sprays by Nasal route in the morning and at bedtime  Azelastine HCl 137 MCG/SPRAY SOLN, 2 sprays by Nasal route in the morning and at bedtime  empagliflozin (JARDIANCE) 10 MG tablet, Take 1 tablet by mouth daily  Continuous Blood Gluc Sensor ([employer]YLE RANJIT 3 SENSOR) [facility], Change sensor every 14 days for continuous glucose 
[] Medication Reconciliation was completed and the patient's home medication list was verified. The Med List Status is \"Complete\". The following sources were used to assist with Medication Reconciliation:    [] Patient had a list of medications which was transcribed into the EHR.    [] Patient provided bottles of their medications    [] Home medications reviewed and confirmed with     [x] Contacted patient's pharmacy to confirm home medications    [] Contacted patient's physician office to confirm home medications    [] Medical Records from another facility and/or Care Everywhere were reviewed      [x] There are one or more home medications that need clarification before Medication Reconciliation can be completed. The Med List Status has been marked as In Progress.     To assist with Home Medication Reconciliation the following actions have been taken:    [x] Pharmacy medication reconciliation service requested. (Note: This can be done by sending a Perfect Serve message to [facility] Rec Pharmacist or by phoning [phone].)  [] Family requested to bring medications into the hospital  [] Family requested to call hospital with medication list  [] Message left with physician office  [] Request for medical records made to    [] Other      
128/81, pulse 77, temperature 97.3 °F (36.3 °C), resp. rate 18, height 1.803 m (5' 11\"), weight 115.3 kg (254 lb 4.8 oz), SpO2 95 %.  In: 4478.9 [P.O.:760; I.V.:3718.9]  Out: 2370   In: 4478.9   Out: 2370 [Urine:2370]    General:  Awake, alert, not in distress. Appears to be stated age.  HEENT: Atraumatic, normocephalic. Anicteric sclera. Pink and moist oral mucosa. No carotid bruit. No JVD.  Chest: Bilateral air entry, clear to auscultation, no wheezing, rhonchi or rales.  Cardiovascular: RRR, S1S2, no murmur, rub or gallop. Has lower extremity edema.    Abdomen: Soft, non tender to palpation. Active bowel sounds x 4 quadrants.  Musculoskeletal: Active ROM x 4 extremities. No cyanosis or clubbing.  Integumentary: Pink, warm and dry. Free from rash or lesions. Skin turgor normal.  CNS: Oriented to person, place and time. Speech clear. Face symmetrical. No tremor.     Data:  CBC:   Lab Results   Component Value Date    WBC 17.0 (H) 03/02/2024    HGB 12.4 (L) 03/02/2024    HCT 38.9 (L) 03/02/2024    MCV 93.1 03/02/2024     03/02/2024     BMP:    Lab Results   Component Value Date     (L) 03/02/2024    K 4.4 03/02/2024     03/02/2024    CO2 14 (L) 03/02/2024     (HH) 03/02/2024    CREATININE 4.1 (H) 03/02/2024    GLUCOSE 168 (H) 03/02/2024     CMP:   Lab Results   Component Value Date     (L) 03/02/2024    K 4.4 03/02/2024     03/02/2024    CO2 14 (L) 03/02/2024     (HH) 03/02/2024    CREATININE 4.1 (H) 03/02/2024    GLUCOSE 168 (H) 03/02/2024    CALCIUM 8.5 (L) 03/02/2024    PROT 6.7 03/01/2024    LABALBU 3.5 03/01/2024    BILITOT 0.2 (L) 03/01/2024    ALKPHOS 84 03/01/2024    AST 53 (H) 03/01/2024    ALT 33 03/01/2024      Hepatic:   Lab Results   Component Value Date    AST 53 (H) 03/01/2024    ALT 33 03/01/2024    BILITOT 0.2 (L) 03/01/2024    ALKPHOS 84 03/01/2024     BNP:   Lab Results   Component Value Date    BNP 90 11/21/2012     Lipids:   Lab Results   Component 
Axillary, resp. rate 18, height 1.803 m (5' 11\"), weight 117.4 kg (258 lb 14.4 oz), SpO2 99 %.  In: 3087.9 [P.O.:1285; I.V.:1802.9]  Out: 3625   In: 3087.9   Out: 3625 [Urine:3625]    General:  Awake, alert, not in distress. Appears to be stated age.  HEENT: Atraumatic, normocephalic. Anicteric sclera. Pink and moist oral mucosa. No carotid bruit. No JVD.  Chest: Bilateral air entry, clear to auscultation, no wheezing, rhonchi or rales.  Cardiovascular: RRR, S1S2, no murmur, rub or gallop. Has lower extremity edema.    Abdomen: Soft, non tender to palpation. Active bowel sounds x 4 quadrants.  Musculoskeletal: Active ROM x 4 extremities. No cyanosis or clubbing.  Integumentary: Pink, warm and dry. Free from rash or lesions. Skin turgor normal.  CNS: Oriented to person, place and time. Speech clear. Face symmetrical. No tremor.     Data:  CBC:   Lab Results   Component Value Date    WBC 8.6 03/04/2024    HGB 10.1 (L) 03/04/2024    HCT 29.8 (L) 03/04/2024    MCV 88.7 03/04/2024     03/04/2024     BMP:    Lab Results   Component Value Date     03/05/2024    K 3.7 03/05/2024     03/05/2024    CO2 23 03/05/2024    BUN 74 (H) 03/05/2024    CREATININE 2.6 (H) 03/05/2024    GLUCOSE 140 (H) 03/05/2024     CMP:   Lab Results   Component Value Date     03/05/2024    K 3.7 03/05/2024     03/05/2024    CO2 23 03/05/2024    BUN 74 (H) 03/05/2024    CREATININE 2.6 (H) 03/05/2024    GLUCOSE 140 (H) 03/05/2024    CALCIUM 8.1 (L) 03/05/2024    PROT 6.7 03/01/2024    LABALBU 3.5 03/01/2024    BILITOT 0.2 (L) 03/01/2024    ALKPHOS 84 03/01/2024    AST 53 (H) 03/01/2024    ALT 33 03/01/2024      Hepatic:   Lab Results   Component Value Date    AST 53 (H) 03/01/2024    ALT 33 03/01/2024    BILITOT 0.2 (L) 03/01/2024    ALKPHOS 84 03/01/2024     BNP:   Lab Results   Component Value Date    BNP 90 11/21/2012     Lipids:   Lab Results   Component Value Date    CHOL 197 12/19/2022    HDL 31 (L) 12/19/2022 
    BNP:   Lab Results   Component Value Date    BNP 90 11/21/2012     Lipids:   Lab Results   Component Value Date    CHOL 197 12/19/2022    HDL 31 (L) 12/19/2022     INR:   Lab Results   Component Value Date    INR 1.1 02/29/2024     PTH: No results found for: \"PTH\"  Phosphorus:    Lab Results   Component Value Date    PHOS 3.3 04/18/2023     Ionized Calcium: No results found for: \"IONCA\"  Magnesium:   Lab Results   Component Value Date    MG 1.9 03/01/2024     Albumin:   Lab Results   Component Value Date    LABALBU 3.5 03/01/2024     Last 3 CK, CKMB, Troponin: @LABRCNT(CKTOTAL:3,CKMB:3,TROPONINI:3)       URINE:)No results found for: \"NAUR\", \"PROTUR\"    Radiology:   Reviewed.    Assessment:  Acute kidney injury, FeNa was 1.61%,appears to be hemodynamically related. Creatinine is higher today.Has a high bladder volume on US.  Underlying CKD stage 3B with baseline GFR of 30-40s ml/min.  Hyponatremia.  Hyperkalemia, secondary to DEBBI.  AG metabolic acidosis.  Proteinuria.     Plan:  Continue bicarbonate drip and oral sodium bicarbonate as prescribed.   Agree with holding Lisinopril and Jardiance.   Comprehensive urine testing including urinalysis, urine sodium, creatinine (FENa), and urine protein and creatinine to quantify any proteinuria ordered.  Will discontinue Lokelma and give him Kayexalate 45 gm x 1 today.  Low K diet without oral fluid restriction.   Will check Hemoccult stools.   Will straight cath to check post void residual.  Monitor BP.  Avoid hypotension, nephrotoxic drugs, Lovenox, Fleets enema and IV contrast exposure.  Follow up labs ordered for AM.     Please do not hesitate to call with questions. We will follow with you.    Electronically signed by Jerome Barnes MD  on 3/1/2024 at 12:43 PM  [facility].  Ph: [phone]   
197 12/19/2022    HDL 31 (L) 12/19/2022     INR:   Lab Results   Component Value Date    INR 1.1 02/29/2024     PTH: No results found for: \"PTH\"  Phosphorus:    Lab Results   Component Value Date    PHOS 6.4 (H) 03/03/2024     Ionized Calcium: No results found for: \"IONCA\"  Magnesium:   Lab Results   Component Value Date    MG 2.1 03/03/2024     Albumin:   Lab Results   Component Value Date    LABALBU 3.5 03/01/2024     Last 3 CK, CKMB, Troponin: @LABRCNT(CKTOTAL:3,CKMB:3,TROPONINI:3)       URINE:)No results found for: \"NAUR\", \"PROTUR\"    Radiology:   Reviewed.    Assessment:  Acute kidney injury, FeNa was 1.61%, appears to be hemodynamically related. Creatinine is ranging 3.5 to 4.2. He had a high bladder volume on US. However had no post void residual with bladder scan and straight cath.   Underlying CKD stage 3B with baseline GFR of 30-40s ml/min per labs from 2022.  Hyponatremia, improved.  Hypokalemia.  AG metabolic acidosis, improving.  Proteinuria.  Single R kidney since 2020.  Anemia with Hemoccult positive stools.      Plan:  Will discontinue bicarbonate drip.  Will start NS at 50 ml/hr.   Continue oral sodium bicarbonate as prescribed.   Agree with holding Lisinopril and Jardiance. Monitor BP.  Will replete K orally.  Low K diet without oral fluid restriction.   Avoid hypotension, nephrotoxic drugs, Lovenox, Fleets enema and IV contrast exposure.  Follow up labs ordered for AM.     Please do not hesitate to call with questions. We will follow with you.    Electronically signed by Jerome Barnes MD  on 3/3/2024 at 8:16 AM  [facility].  Ph: [phone]   
soft, nontender, nondistended, normal bowel sounds, no masses, hepatomegaly, splenomegaly  Extremities:  no edema, redness, tenderness in the calves  Skin:  no gross lesions, rashes, induration    Assessment:        Hospital Problems             Last Modified POA    * (Principal) DEBBI (acute kidney injury) ([facility]) 2/29/2024 Yes    Essential hypertension 2/29/2024 Yes    Class 2 severe obesity with serious comorbidity and body mass index (BMI) of 36.0 to 36.9 in adult ([facility]) 2/29/2024 Yes    CKD (chronic kidney disease) stage 4, GFR 15-29 ml/min ([facility]) 2/29/2024 Yes    Type 2 diabetes mellitus with diabetic nephropathy, with long-term current use of insulin ([facility]) 2/29/2024 Yes    Influenza A 2/29/2024 Yes    Hyperkalemia 2/29/2024 Yes    Tachycardia 2/29/2024 Yes    Elevated d-dimer 2/29/2024 Yes       Plan:        Continue IV hydration  Avoid nephrotoxic agents  Monitor and control blood pressure  Correct electrolytes as needed  Insulin scale  Discontinue oral fluid restriction  Likely discharge in the next 24 hours if kidney function continues to improve, baseline creatinine 1.8-2.2 will likely continue to recover.    Josse Han DO  3/4/2024  8:20 AM

## 2024-03-05 NOTE — DISCHARGE SUMMARY
[facility]  Office: [phone]  Aung Luis DO, Mansoor Cline DO, Josse Han DO, Gregory Archibald DO, Andrew Roberts MD, Mayra Medel MD, Ray Johnson MD, Romy Amador MD,  Mic Meraz MD, Rom Hayes MD, Peter Isbell DO, Casandra Washington MD,  Ely Burks DO, Dorie Anderson MD, Marcos Travis MD, Trey Luis DO, Delia Clemens MD,  Jorge Major DO, Gisel Herbert MD, Rose Yi MD, Celia Guillen MD, Matthew Hyatt MD,  Sammy Galvez MD, Mary Kay Karimi MD, Edson Hay MD, Mateo Turner MD, Dick Lam DO, Giles Cat MD,  Eran Mckenzie MD, Stan Cline MD, Shirley Waterhouse, BRIT,  Missy Chowdhury CNP,, Mikael Cai, CNP,  Shantelle Monson, DNP, Betty Boles, CNP, Yany Garcia, CNP, Zulay Ramsay CNP, Cathie Law, CNP, Lynda Russell, CNP, Jaclyn Neff, CNP, Patsy Peterson, CNS, Yareli Montiel, CNP, Martha Prince, CNP                  [facility]      Discharge Summary     Patient ID: Prateek Denton II  :  1960   MRN: [id number]     ACCOUNT:  [id number]   Patient Location : 1002/1002-02  Patient's PCP: Lisseth Coello, TORREY - CNP  Admit Date: 2024   Discharge Date: 3/5/2024     Length of Stay: 5  Code Status:  Prior  Admitting Physician: Josse Han DO  Discharge Physician: Ray Johnson MD     Active Discharge Diagnosis :     Primary Problem  DEBBI (acute kidney injury) (HCC)      Hospital Problems  Active Hospital Problems    Diagnosis Date Noted    DEBBI (acute kidney injury) (HCC) [N17.9] 2024    Influenza A [J10.1] 2024    Hyperkalemia [E87.5] 2024    Tachycardia [R00.0] 2024    Elevated d-dimer [R79.89] 2024    Type 2 diabetes mellitus with diabetic nephropathy, with long-term current use of insulin ([facility]) [E11.21, Z79.4] 2023    Essential hypertension [I10] 10/04/2017    CKD (chronic kidney disease) stage 4, GFR 15-29 ml/min ([facility]) [N18.4] 10/04/2017    Class 2

## 2024-03-06 ENCOUNTER — TELEPHONE (OUTPATIENT)
Dept: FAMILY MEDICINE CLINIC | Age: 64
End: 2024-03-06

## 2024-03-06 NOTE — TELEPHONE ENCOUNTER
Care Transitions Initial Follow Up Call    Outreach made within 2 business days of discharge: Yes    Patient: Prateek Denton II Patient : 1960   MRN: [id number]  Reason for Admission: There are no discharge diagnoses documented for the most recent discharge.  Discharge Date: 3/5/24       Spoke with: Andrew    Discharge department/facility: [facility] Interactive Patient Contact:  Was patient able to fill all prescriptions: Yes  Was patient instructed to bring all medications to the follow-up visit: Yes  Is patient taking all medications as directed in the discharge summary? Yes  Does patient understand their discharge instructions: Yes  Does patient have questions or concerns that need addressed prior to 7-14 day follow up office visit: no    Scheduled appointment with PCP within 7-14 days    Follow Up  Future Appointments   Date Time Provider Department Center   3/20/2024  1:00 PM  [facility] MEDICATION [facility]   6/3/2024  1:30 PM Lisseth Coello, APRN - CNP [facility]       Jane Chiu MA

## 2024-03-11 ENCOUNTER — APPOINTMENT (OUTPATIENT)
Dept: CT IMAGING | Age: 64
DRG: 871 | End: 2024-03-11
Payer: COMMERCIAL

## 2024-03-11 ENCOUNTER — APPOINTMENT (OUTPATIENT)
Dept: GENERAL RADIOLOGY | Age: 64
DRG: 871 | End: 2024-03-11
Payer: COMMERCIAL

## 2024-03-11 ENCOUNTER — HOSPITAL ENCOUNTER (INPATIENT)
Age: 64
LOS: 4 days | Discharge: HOME | DRG: 871 | End: 2024-03-16
Attending: EMERGENCY MEDICINE | Admitting: INTERNAL MEDICINE
Payer: COMMERCIAL

## 2024-03-11 DIAGNOSIS — R19.5 DARK STOOLS: ICD-10-CM

## 2024-03-11 DIAGNOSIS — E87.1 HYPONATREMIA: ICD-10-CM

## 2024-03-11 DIAGNOSIS — K20.90 ESOPHAGITIS DETERMINED BY ENDOSCOPY: ICD-10-CM

## 2024-03-11 DIAGNOSIS — J18.9 PNEUMONIA OF RIGHT LOWER LOBE DUE TO INFECTIOUS ORGANISM: Primary | ICD-10-CM

## 2024-03-11 DIAGNOSIS — K92.2 GASTROINTESTINAL HEMORRHAGE, UNSPECIFIED GASTROINTESTINAL HEMORRHAGE TYPE: ICD-10-CM

## 2024-03-11 DIAGNOSIS — N18.4 CKD (CHRONIC KIDNEY DISEASE) STAGE 4, GFR 15-29 ML/MIN (HCC): ICD-10-CM

## 2024-03-11 DIAGNOSIS — N04.9 NEPHROTIC SYNDROME: ICD-10-CM

## 2024-03-11 DIAGNOSIS — K52.9 CHRONIC DIARRHEA: ICD-10-CM

## 2024-03-11 DIAGNOSIS — A41.9 SEPSIS, DUE TO UNSPECIFIED ORGANISM, UNSPECIFIED WHETHER ACUTE ORGAN DYSFUNCTION PRESENT (HCC): ICD-10-CM

## 2024-03-11 LAB
ALBUMIN SERPL-MCNC: 2.9 G/DL (ref 3.5–5.2)
ALP SERPL-CCNC: 135 U/L (ref 40–129)
ALT SERPL-CCNC: 96 U/L (ref 5–41)
ANION GAP SERPL CALCULATED.3IONS-SCNC: 18 MMOL/L (ref 9–17)
AST SERPL-CCNC: 69 U/L
BILIRUB SERPL-MCNC: 0.8 MG/DL (ref 0.3–1.2)
BUN SERPL-MCNC: 56 MG/DL (ref 8–23)
BUN/CREAT SERPL: 17 (ref 9–20)
CALCIUM SERPL-MCNC: 9.1 MG/DL (ref 8.6–10.4)
CHLORIDE SERPL-SCNC: 96 MMOL/L (ref 98–107)
CO2 SERPL-SCNC: 17 MMOL/L (ref 20–31)
CREAT SERPL-MCNC: 3.3 MG/DL (ref 0.7–1.2)
FLUAV RNA RESP QL NAA+PROBE: DETECTED
FLUBV RNA RESP QL NAA+PROBE: NOT DETECTED
GFR SERPL CREATININE-BSD FRML MDRD: 20 ML/MIN/1.73M2
GLUCOSE SERPL-MCNC: 176 MG/DL (ref 70–99)
INR PPP: 1.2
LACTATE BLDV-SCNC: 1.6 MMOL/L (ref 0.5–2.2)
LIPASE SERPL-CCNC: 146 U/L (ref 13–60)
PARTIAL THROMBOPLASTIN TIME: 27.1 SEC (ref 23.9–33.8)
POTASSIUM SERPL-SCNC: 4.2 MMOL/L (ref 3.7–5.3)
PROT SERPL-MCNC: 7.7 G/DL (ref 6.4–8.3)
PROTHROMBIN TIME: 15.5 SEC (ref 11.5–14.2)
SARS-COV-2 RNA RESP QL NAA+PROBE: NOT DETECTED
SODIUM SERPL-SCNC: 131 MMOL/L (ref 135–144)
SOURCE: ABNORMAL
SPECIMEN DESCRIPTION: ABNORMAL
TROPONIN I SERPL HS-MCNC: 75 NG/L (ref 0–22)

## 2024-03-11 PROCEDURE — 99285 EMERGENCY DEPT VISIT HI MDM: CPT

## 2024-03-11 PROCEDURE — 85025 COMPLETE CBC W/AUTO DIFF WBC: CPT

## 2024-03-11 PROCEDURE — 71045 X-RAY EXAM CHEST 1 VIEW: CPT

## 2024-03-11 PROCEDURE — 85730 THROMBOPLASTIN TIME PARTIAL: CPT

## 2024-03-11 PROCEDURE — 84484 ASSAY OF TROPONIN QUANT: CPT

## 2024-03-11 PROCEDURE — 80053 COMPREHEN METABOLIC PANEL: CPT

## 2024-03-11 PROCEDURE — 2580000003 HC RX 258: Performed by: EMERGENCY MEDICINE

## 2024-03-11 PROCEDURE — 81001 URINALYSIS AUTO W/SCOPE: CPT

## 2024-03-11 PROCEDURE — 74176 CT ABD & PELVIS W/O CONTRAST: CPT

## 2024-03-11 PROCEDURE — 85610 PROTHROMBIN TIME: CPT

## 2024-03-11 PROCEDURE — 86900 BLOOD TYPING SEROLOGIC ABO: CPT

## 2024-03-11 PROCEDURE — 87636 SARSCOV2 & INF A&B AMP PRB: CPT

## 2024-03-11 PROCEDURE — 83605 ASSAY OF LACTIC ACID: CPT

## 2024-03-11 PROCEDURE — 87040 BLOOD CULTURE FOR BACTERIA: CPT

## 2024-03-11 PROCEDURE — 86901 BLOOD TYPING SEROLOGIC RH(D): CPT

## 2024-03-11 PROCEDURE — 83690 ASSAY OF LIPASE: CPT

## 2024-03-11 PROCEDURE — 93005 ELECTROCARDIOGRAM TRACING: CPT | Performed by: EMERGENCY MEDICINE

## 2024-03-11 PROCEDURE — 86850 RBC ANTIBODY SCREEN: CPT

## 2024-03-11 PROCEDURE — 36415 COLL VENOUS BLD VENIPUNCTURE: CPT

## 2024-03-11 RX ORDER — SODIUM CHLORIDE 9 MG/ML
INJECTION, SOLUTION INTRAVENOUS PRN
Status: DISCONTINUED | OUTPATIENT
Start: 2024-03-11 | End: 2024-03-12

## 2024-03-11 RX ORDER — 0.9 % SODIUM CHLORIDE 0.9 %
1000 INTRAVENOUS SOLUTION INTRAVENOUS ONCE
Status: COMPLETED | OUTPATIENT
Start: 2024-03-11 | End: 2024-03-11

## 2024-03-11 RX ORDER — SODIUM CHLORIDE 0.9 % (FLUSH) 0.9 %
5-40 SYRINGE (ML) INJECTION PRN
Status: DISCONTINUED | OUTPATIENT
Start: 2024-03-11 | End: 2024-03-12

## 2024-03-11 RX ORDER — SODIUM CHLORIDE, SODIUM LACTATE, POTASSIUM CHLORIDE, AND CALCIUM CHLORIDE .6; .31; .03; .02 G/100ML; G/100ML; G/100ML; G/100ML
30 INJECTION, SOLUTION INTRAVENOUS ONCE
Status: COMPLETED | OUTPATIENT
Start: 2024-03-11 | End: 2024-03-12

## 2024-03-11 RX ORDER — METRONIDAZOLE 500 MG/100ML
500 INJECTION, SOLUTION INTRAVENOUS ONCE
Status: COMPLETED | OUTPATIENT
Start: 2024-03-11 | End: 2024-03-12

## 2024-03-11 RX ORDER — SODIUM CHLORIDE 0.9 % (FLUSH) 0.9 %
5-40 SYRINGE (ML) INJECTION EVERY 12 HOURS SCHEDULED
Status: DISCONTINUED | OUTPATIENT
Start: 2024-03-11 | End: 2024-03-12

## 2024-03-11 RX ADMIN — SODIUM CHLORIDE 1000 ML: 9 INJECTION, SOLUTION INTRAVENOUS at 22:30

## 2024-03-11 ASSESSMENT — PAIN DESCRIPTION - LOCATION: LOCATION: ABDOMEN

## 2024-03-11 ASSESSMENT — PAIN SCALES - GENERAL: PAINLEVEL_OUTOF10: 7

## 2024-03-11 ASSESSMENT — PAIN - FUNCTIONAL ASSESSMENT: PAIN_FUNCTIONAL_ASSESSMENT: 0-10

## 2024-03-12 PROBLEM — E87.1 HYPONATREMIA: Status: ACTIVE | Noted: 2024-03-12

## 2024-03-12 PROBLEM — A41.9 SEPSIS (HCC): Status: ACTIVE | Noted: 2024-03-12

## 2024-03-12 PROBLEM — I48.0 PAROXYSMAL A-FIB (HCC): Status: ACTIVE | Noted: 2024-03-12

## 2024-03-12 PROBLEM — K56.7 ILEUS (HCC): Status: ACTIVE | Noted: 2024-03-12

## 2024-03-12 PROBLEM — K52.9 CHRONIC DIARRHEA: Status: ACTIVE | Noted: 2024-03-12

## 2024-03-12 PROBLEM — J18.9 PNEUMONIA OF RIGHT LOWER LOBE DUE TO INFECTIOUS ORGANISM: Status: ACTIVE | Noted: 2024-03-12

## 2024-03-12 PROBLEM — J11.00 PNEUMONIA OF RIGHT LOWER LOBE DUE TO INFLUENZA A VIRUS: Status: ACTIVE | Noted: 2024-03-12

## 2024-03-12 PROBLEM — K20.90 ESOPHAGITIS DETERMINED BY ENDOSCOPY: Status: ACTIVE | Noted: 2024-03-12

## 2024-03-12 PROBLEM — N18.9 ACUTE KIDNEY INJURY SUPERIMPOSED ON CKD (HCC): Status: ACTIVE | Noted: 2024-02-29

## 2024-03-12 PROBLEM — K31.82 DIEULAFOY LESION (HEMORRHAGIC) OF STOMACH AND DUODENUM: Status: ACTIVE | Noted: 2024-03-12

## 2024-03-12 PROBLEM — R19.5 DARK STOOLS: Status: ACTIVE | Noted: 2024-03-12

## 2024-03-12 LAB
ABO + RH BLD: NORMAL
ALBUMIN SERPL-MCNC: 2.5 G/DL (ref 3.5–5.2)
ALP SERPL-CCNC: 122 U/L (ref 40–129)
ALT SERPL-CCNC: 91 U/L (ref 5–41)
ANION GAP SERPL CALCULATED.3IONS-SCNC: 13 MMOL/L (ref 9–17)
ARM BAND NUMBER: NORMAL
AST SERPL-CCNC: 65 U/L
BASOPHILS # BLD: 0.27 K/UL (ref 0–0.2)
BASOPHILS NFR BLD: 1 %
BILIRUB SERPL-MCNC: 0.7 MG/DL (ref 0.3–1.2)
BILIRUB UR QL STRIP: NEGATIVE
BLOOD BANK SAMPLE EXPIRATION: NORMAL
BLOOD GROUP ANTIBODIES SERPL: NEGATIVE
BUN SERPL-MCNC: 54 MG/DL (ref 8–23)
BUN/CREAT SERPL: 19 (ref 9–20)
CALCIUM SERPL-MCNC: 8.6 MG/DL (ref 8.6–10.4)
CHLORIDE SERPL-SCNC: 103 MMOL/L (ref 98–107)
CLARITY UR: CLEAR
CO2 SERPL-SCNC: 18 MMOL/L (ref 20–31)
COLOR UR: YELLOW
CREAT SERPL-MCNC: 2.8 MG/DL (ref 0.7–1.2)
EKG ATRIAL RATE: 132 BPM
EKG P AXIS: 31 DEGREES
EKG P-R INTERVAL: 148 MS
EKG Q-T INTERVAL: 288 MS
EKG QRS DURATION: 76 MS
EKG QTC CALCULATION (BAZETT): 426 MS
EKG R AXIS: 1 DEGREES
EKG T AXIS: 19 DEGREES
EKG VENTRICULAR RATE: 132 BPM
EOSINOPHIL # BLD: 0.27 K/UL (ref 0–0.4)
EOSINOPHILS RELATIVE PERCENT: 1 % (ref 1–4)
EPI CELLS #/AREA URNS HPF: NORMAL /HPF (ref 0–5)
ERYTHROCYTE [DISTWIDTH] IN BLOOD BY AUTOMATED COUNT: 12.7 % (ref 11.8–14.4)
GFR SERPL CREATININE-BSD FRML MDRD: 24 ML/MIN/1.73M2
GLUCOSE BLD-MCNC: 139 MG/DL (ref 75–110)
GLUCOSE BLD-MCNC: 148 MG/DL (ref 75–110)
GLUCOSE BLD-MCNC: 170 MG/DL (ref 75–110)
GLUCOSE BLD-MCNC: 185 MG/DL (ref 75–110)
GLUCOSE SERPL-MCNC: 148 MG/DL (ref 70–99)
GLUCOSE UR STRIP-MCNC: ABNORMAL MG/DL
HCT VFR BLD AUTO: 27 % (ref 40.7–50.3)
HCT VFR BLD AUTO: 32.2 % (ref 40.7–50.3)
HEMOCCULT SP1 STL QL: NEGATIVE
HGB BLD-MCNC: 10.7 G/DL (ref 13–17)
HGB BLD-MCNC: 8.8 G/DL (ref 13–17)
HGB UR QL STRIP.AUTO: ABNORMAL
IMM GRANULOCYTES # BLD AUTO: 0 K/UL (ref 0–0.3)
IMM GRANULOCYTES NFR BLD: 0 %
KETONES UR STRIP-MCNC: NEGATIVE MG/DL
L PNEUMO1 AG UR QL IA.RAPID: NEGATIVE
LACTATE BLDV-SCNC: 1 MMOL/L (ref 0.5–1.9)
LACTATE BLDV-SCNC: 1.1 MMOL/L (ref 0.5–2.2)
LACTATE BLDV-SCNC: 1.9 MMOL/L (ref 0.5–1.9)
LEUKOCYTE ESTERASE UR QL STRIP: NEGATIVE
LYMPHOCYTES NFR BLD: 1.6 K/UL (ref 1–4.8)
LYMPHOCYTES RELATIVE PERCENT: 6 % (ref 24–44)
MCH RBC QN AUTO: 29.9 PG (ref 25.2–33.5)
MCHC RBC AUTO-ENTMCNC: 33.2 G/DL (ref 28.4–34.8)
MCV RBC AUTO: 89.9 FL (ref 82.6–102.9)
MONOCYTES NFR BLD: 2.4 K/UL (ref 0.2–0.8)
MONOCYTES NFR BLD: 9 % (ref 1–7)
NEUTROPHILS NFR BLD: 83 % (ref 36–66)
NEUTS SEG NFR BLD: 22.16 K/UL (ref 1.8–7.7)
NITRITE UR QL STRIP: NEGATIVE
NRBC BLD-RTO: 0 PER 100 WBC
PH UR STRIP: 6 [PH] (ref 5–8)
PLATELET # BLD AUTO: 320 K/UL (ref 138–453)
PMV BLD AUTO: 10.8 FL (ref 8.1–13.5)
POTASSIUM SERPL-SCNC: 4.3 MMOL/L (ref 3.7–5.3)
PROCALCITONIN SERPL-MCNC: 1.69 NG/ML (ref 0–0.09)
PROT SERPL-MCNC: 6.7 G/DL (ref 6.4–8.3)
PROT UR STRIP-MCNC: ABNORMAL MG/DL
RBC # BLD AUTO: 3.58 M/UL (ref 4.21–5.77)
RBC #/AREA URNS HPF: NORMAL /HPF (ref 0–2)
S PNEUM AG SPEC QL: NEGATIVE
SODIUM SERPL-SCNC: 134 MMOL/L (ref 135–144)
SP GR UR STRIP: 1.02 (ref 1–1.03)
SPECIMEN SOURCE: NORMAL
TROPONIN I SERPL HS-MCNC: 74 NG/L (ref 0–22)
UROBILINOGEN UR STRIP-ACNC: NORMAL EU/DL (ref 0–1)
WBC #/AREA URNS HPF: NORMAL /HPF (ref 0–5)
WBC OTHER # BLD: 26.7 K/UL (ref 3.5–11.3)

## 2024-03-12 PROCEDURE — 87324 CLOSTRIDIUM AG IA: CPT

## 2024-03-12 PROCEDURE — 96365 THER/PROPH/DIAG IV INF INIT: CPT

## 2024-03-12 PROCEDURE — 94761 N-INVAS EAR/PLS OXIMETRY MLT: CPT

## 2024-03-12 PROCEDURE — 97530 THERAPEUTIC ACTIVITIES: CPT

## 2024-03-12 PROCEDURE — 6360000002 HC RX W HCPCS: Performed by: NURSE PRACTITIONER

## 2024-03-12 PROCEDURE — 87641 MR-STAPH DNA AMP PROBE: CPT

## 2024-03-12 PROCEDURE — 87425 ROTAVIRUS AG IA: CPT

## 2024-03-12 PROCEDURE — 82272 OCCULT BLD FECES 1-3 TESTS: CPT

## 2024-03-12 PROCEDURE — 82947 ASSAY GLUCOSE BLOOD QUANT: CPT

## 2024-03-12 PROCEDURE — 6360000002 HC RX W HCPCS: Performed by: EMERGENCY MEDICINE

## 2024-03-12 PROCEDURE — 6370000000 HC RX 637 (ALT 250 FOR IP): Performed by: NURSE PRACTITIONER

## 2024-03-12 PROCEDURE — 2060000000 HC ICU INTERMEDIATE R&B

## 2024-03-12 PROCEDURE — A4216 STERILE WATER/SALINE, 10 ML: HCPCS | Performed by: INTERNAL MEDICINE

## 2024-03-12 PROCEDURE — 87070 CULTURE OTHR SPECIMN AEROBIC: CPT

## 2024-03-12 PROCEDURE — 87205 SMEAR GRAM STAIN: CPT

## 2024-03-12 PROCEDURE — 83993 ASSAY FOR CALPROTECTIN FECAL: CPT

## 2024-03-12 PROCEDURE — 97166 OT EVAL MOD COMPLEX 45 MIN: CPT

## 2024-03-12 PROCEDURE — 36415 COLL VENOUS BLD VENIPUNCTURE: CPT

## 2024-03-12 PROCEDURE — 87506 IADNA-DNA/RNA PROBE TQ 6-11: CPT

## 2024-03-12 PROCEDURE — 85018 HEMOGLOBIN: CPT

## 2024-03-12 PROCEDURE — 2580000003 HC RX 258: Performed by: INTERNAL MEDICINE

## 2024-03-12 PROCEDURE — 99223 1ST HOSP IP/OBS HIGH 75: CPT | Performed by: INTERNAL MEDICINE

## 2024-03-12 PROCEDURE — 87449 NOS EACH ORGANISM AG IA: CPT

## 2024-03-12 PROCEDURE — APPSS45 APP SPLIT SHARED TIME 31-45 MINUTES: Performed by: NURSE PRACTITIONER

## 2024-03-12 PROCEDURE — 97161 PT EVAL LOW COMPLEX 20 MIN: CPT

## 2024-03-12 PROCEDURE — 6360000002 HC RX W HCPCS: Performed by: INTERNAL MEDICINE

## 2024-03-12 PROCEDURE — 84484 ASSAY OF TROPONIN QUANT: CPT

## 2024-03-12 PROCEDURE — 87899 AGENT NOS ASSAY W/OPTIC: CPT

## 2024-03-12 PROCEDURE — 2580000003 HC RX 258: Performed by: NURSE PRACTITIONER

## 2024-03-12 PROCEDURE — 97110 THERAPEUTIC EXERCISES: CPT

## 2024-03-12 PROCEDURE — 2580000003 HC RX 258: Performed by: EMERGENCY MEDICINE

## 2024-03-12 PROCEDURE — 6370000000 HC RX 637 (ALT 250 FOR IP): Performed by: INTERNAL MEDICINE

## 2024-03-12 PROCEDURE — 80202 ASSAY OF VANCOMYCIN: CPT

## 2024-03-12 PROCEDURE — C9113 INJ PANTOPRAZOLE SODIUM, VIA: HCPCS | Performed by: INTERNAL MEDICINE

## 2024-03-12 PROCEDURE — 96374 THER/PROPH/DIAG INJ IV PUSH: CPT

## 2024-03-12 PROCEDURE — 97535 SELF CARE MNGMENT TRAINING: CPT

## 2024-03-12 PROCEDURE — 85014 HEMATOCRIT: CPT

## 2024-03-12 PROCEDURE — 84145 PROCALCITONIN (PCT): CPT

## 2024-03-12 PROCEDURE — 93010 ELECTROCARDIOGRAM REPORT: CPT | Performed by: INTERNAL MEDICINE

## 2024-03-12 PROCEDURE — 80053 COMPREHEN METABOLIC PANEL: CPT

## 2024-03-12 PROCEDURE — 2700000000 HC OXYGEN THERAPY PER DAY

## 2024-03-12 RX ORDER — SODIUM CHLORIDE 9 MG/ML
INJECTION, SOLUTION INTRAVENOUS CONTINUOUS
Status: DISCONTINUED | OUTPATIENT
Start: 2024-03-12 | End: 2024-03-14

## 2024-03-12 RX ORDER — POLYETHYLENE GLYCOL 3350 17 G/17G
17 POWDER, FOR SOLUTION ORAL 2 TIMES DAILY
Status: DISCONTINUED | OUTPATIENT
Start: 2024-03-12 | End: 2024-03-14

## 2024-03-12 RX ORDER — PRAVASTATIN SODIUM 40 MG
20 TABLET ORAL DAILY
Status: DISCONTINUED | OUTPATIENT
Start: 2024-03-12 | End: 2024-03-16 | Stop reason: HOSPADM

## 2024-03-12 RX ORDER — SODIUM CHLORIDE 0.9 % (FLUSH) 0.9 %
10 SYRINGE (ML) INJECTION PRN
Status: DISCONTINUED | OUTPATIENT
Start: 2024-03-12 | End: 2024-03-16 | Stop reason: HOSPADM

## 2024-03-12 RX ORDER — FENTANYL CITRATE 0.05 MG/ML
50 INJECTION, SOLUTION INTRAMUSCULAR; INTRAVENOUS ONCE
Status: COMPLETED | OUTPATIENT
Start: 2024-03-12 | End: 2024-03-12

## 2024-03-12 RX ORDER — FOLIC ACID/VIT B COMPLEX AND C 0.8 MG
1 TABLET ORAL DAILY
Status: DISCONTINUED | OUTPATIENT
Start: 2024-03-12 | End: 2024-03-16 | Stop reason: HOSPADM

## 2024-03-12 RX ORDER — ONDANSETRON 4 MG/1
4 TABLET, ORALLY DISINTEGRATING ORAL EVERY 8 HOURS PRN
Status: DISCONTINUED | OUTPATIENT
Start: 2024-03-12 | End: 2024-03-16 | Stop reason: HOSPADM

## 2024-03-12 RX ORDER — DILTIAZEM HYDROCHLORIDE 60 MG/1
60 TABLET, FILM COATED ORAL EVERY 12 HOURS SCHEDULED
Status: DISCONTINUED | OUTPATIENT
Start: 2024-03-12 | End: 2024-03-16 | Stop reason: HOSPADM

## 2024-03-12 RX ORDER — AZELASTINE 1 MG/ML
2 SPRAY, METERED NASAL 2 TIMES DAILY
Status: DISCONTINUED | OUTPATIENT
Start: 2024-03-12 | End: 2024-03-12

## 2024-03-12 RX ORDER — DOXYCYCLINE 100 MG/1
100 CAPSULE ORAL EVERY 12 HOURS SCHEDULED
Status: DISCONTINUED | OUTPATIENT
Start: 2024-03-12 | End: 2024-03-12

## 2024-03-12 RX ORDER — INSULIN LISPRO 100 [IU]/ML
0-4 INJECTION, SOLUTION INTRAVENOUS; SUBCUTANEOUS NIGHTLY
Status: DISCONTINUED | OUTPATIENT
Start: 2024-03-12 | End: 2024-03-16 | Stop reason: HOSPADM

## 2024-03-12 RX ORDER — FLUTICASONE PROPIONATE 50 MCG
2 SPRAY, SUSPENSION (ML) NASAL 2 TIMES DAILY
Status: DISCONTINUED | OUTPATIENT
Start: 2024-03-12 | End: 2024-03-16 | Stop reason: HOSPADM

## 2024-03-12 RX ORDER — GUAIFENESIN 600 MG/1
600 TABLET, EXTENDED RELEASE ORAL 2 TIMES DAILY
Status: DISCONTINUED | OUTPATIENT
Start: 2024-03-12 | End: 2024-03-16 | Stop reason: HOSPADM

## 2024-03-12 RX ORDER — SODIUM CHLORIDE 0.9 % (FLUSH) 0.9 %
5-40 SYRINGE (ML) INJECTION EVERY 12 HOURS SCHEDULED
Status: DISCONTINUED | OUTPATIENT
Start: 2024-03-12 | End: 2024-03-16 | Stop reason: HOSPADM

## 2024-03-12 RX ORDER — ONDANSETRON 2 MG/ML
4 INJECTION INTRAMUSCULAR; INTRAVENOUS EVERY 6 HOURS PRN
Status: DISCONTINUED | OUTPATIENT
Start: 2024-03-12 | End: 2024-03-16 | Stop reason: HOSPADM

## 2024-03-12 RX ORDER — HYDROXYZINE HYDROCHLORIDE 25 MG/1
50 TABLET, FILM COATED ORAL NIGHTLY
Status: DISCONTINUED | OUTPATIENT
Start: 2024-03-12 | End: 2024-03-16 | Stop reason: HOSPADM

## 2024-03-12 RX ORDER — ALBUTEROL SULFATE 90 UG/1
2 AEROSOL, METERED RESPIRATORY (INHALATION) 4 TIMES DAILY PRN
Status: DISCONTINUED | OUTPATIENT
Start: 2024-03-12 | End: 2024-03-16 | Stop reason: HOSPADM

## 2024-03-12 RX ORDER — METOPROLOL TARTRATE 50 MG/1
100 TABLET, FILM COATED ORAL 2 TIMES DAILY
Status: DISCONTINUED | OUTPATIENT
Start: 2024-03-12 | End: 2024-03-16 | Stop reason: HOSPADM

## 2024-03-12 RX ORDER — LANOLIN ALCOHOL/MO/W.PET/CERES
325 CREAM (GRAM) TOPICAL DAILY
Status: DISCONTINUED | OUTPATIENT
Start: 2024-03-12 | End: 2024-03-16 | Stop reason: HOSPADM

## 2024-03-12 RX ORDER — DULOXETIN HYDROCHLORIDE 20 MG/1
20 CAPSULE, DELAYED RELEASE ORAL DAILY
Status: DISCONTINUED | OUTPATIENT
Start: 2024-03-12 | End: 2024-03-16 | Stop reason: HOSPADM

## 2024-03-12 RX ORDER — SODIUM BICARBONATE 650 MG/1
1300 TABLET ORAL 2 TIMES DAILY
Status: DISCONTINUED | OUTPATIENT
Start: 2024-03-12 | End: 2024-03-14

## 2024-03-12 RX ORDER — INSULIN LISPRO 100 [IU]/ML
0-4 INJECTION, SOLUTION INTRAVENOUS; SUBCUTANEOUS
Status: DISCONTINUED | OUTPATIENT
Start: 2024-03-12 | End: 2024-03-16 | Stop reason: HOSPADM

## 2024-03-12 RX ORDER — ACETAMINOPHEN 325 MG/1
650 TABLET ORAL EVERY 6 HOURS PRN
Status: DISCONTINUED | OUTPATIENT
Start: 2024-03-12 | End: 2024-03-16 | Stop reason: HOSPADM

## 2024-03-12 RX ORDER — ACETAMINOPHEN 650 MG/1
650 SUPPOSITORY RECTAL EVERY 6 HOURS PRN
Status: DISCONTINUED | OUTPATIENT
Start: 2024-03-12 | End: 2024-03-16 | Stop reason: HOSPADM

## 2024-03-12 RX ORDER — DEXTROSE MONOHYDRATE 100 MG/ML
INJECTION, SOLUTION INTRAVENOUS CONTINUOUS PRN
Status: DISCONTINUED | OUTPATIENT
Start: 2024-03-12 | End: 2024-03-16 | Stop reason: HOSPADM

## 2024-03-12 RX ORDER — DILTIAZEM HYDROCHLORIDE 60 MG/1
60 TABLET, FILM COATED ORAL EVERY 12 HOURS
Status: DISCONTINUED | OUTPATIENT
Start: 2024-03-12 | End: 2024-03-12

## 2024-03-12 RX ORDER — METOPROLOL TARTRATE 50 MG/1
100 TABLET, FILM COATED ORAL 2 TIMES DAILY
Status: DISCONTINUED | OUTPATIENT
Start: 2024-03-12 | End: 2024-03-12

## 2024-03-12 RX ORDER — INSULIN GLARGINE 100 [IU]/ML
12 INJECTION, SOLUTION SUBCUTANEOUS NIGHTLY
Status: DISCONTINUED | OUTPATIENT
Start: 2024-03-12 | End: 2024-03-16 | Stop reason: HOSPADM

## 2024-03-12 RX ORDER — SODIUM BICARBONATE 650 MG/1
650 TABLET ORAL 2 TIMES DAILY
Status: DISCONTINUED | OUTPATIENT
Start: 2024-03-12 | End: 2024-03-12

## 2024-03-12 RX ORDER — DICYCLOMINE HYDROCHLORIDE 10 MG/1
20 CAPSULE ORAL
Status: DISCONTINUED | OUTPATIENT
Start: 2024-03-12 | End: 2024-03-12

## 2024-03-12 RX ORDER — SODIUM CHLORIDE 9 MG/ML
INJECTION, SOLUTION INTRAVENOUS PRN
Status: DISCONTINUED | OUTPATIENT
Start: 2024-03-12 | End: 2024-03-16 | Stop reason: HOSPADM

## 2024-03-12 RX ADMIN — SODIUM CHLORIDE: 9 INJECTION, SOLUTION INTRAVENOUS at 21:29

## 2024-03-12 RX ADMIN — METRONIDAZOLE 500 MG: 500 INJECTION, SOLUTION INTRAVENOUS at 01:04

## 2024-03-12 RX ADMIN — FERROUS SULFATE TAB EC 325 MG (65 MG FE EQUIVALENT) 325 MG: 325 (65 FE) TABLET DELAYED RESPONSE at 08:28

## 2024-03-12 RX ADMIN — SODIUM CHLORIDE, POTASSIUM CHLORIDE, SODIUM LACTATE AND CALCIUM CHLORIDE 2259 ML: 600; 310; 30; 20 INJECTION, SOLUTION INTRAVENOUS at 00:25

## 2024-03-12 RX ADMIN — FENTANYL CITRATE 50 MCG: 0.05 INJECTION, SOLUTION INTRAMUSCULAR; INTRAVENOUS at 00:54

## 2024-03-12 RX ADMIN — METOPROLOL TARTRATE 100 MG: 50 TABLET, FILM COATED ORAL at 03:25

## 2024-03-12 RX ADMIN — SODIUM CHLORIDE: 9 INJECTION, SOLUTION INTRAVENOUS at 03:30

## 2024-03-12 RX ADMIN — GUAIFENESIN 600 MG: 600 TABLET ORAL at 08:27

## 2024-03-12 RX ADMIN — AZELASTINE HYDROCHLORIDE 2 SPRAY: 137 SPRAY, METERED NASAL at 08:36

## 2024-03-12 RX ADMIN — DILTIAZEM HYDROCHLORIDE 60 MG: 60 TABLET, FILM COATED ORAL at 08:35

## 2024-03-12 RX ADMIN — ACETAMINOPHEN 650 MG: 325 TABLET ORAL at 10:00

## 2024-03-12 RX ADMIN — ACETAMINOPHEN 650 MG: 325 TABLET ORAL at 20:09

## 2024-03-12 RX ADMIN — GUAIFENESIN 600 MG: 600 TABLET ORAL at 21:37

## 2024-03-12 RX ADMIN — METOPROLOL TARTRATE 100 MG: 50 TABLET, FILM COATED ORAL at 21:36

## 2024-03-12 RX ADMIN — SODIUM CHLORIDE, PRESERVATIVE FREE 10 ML: 5 INJECTION INTRAVENOUS at 00:27

## 2024-03-12 RX ADMIN — VANCOMYCIN HYDROCHLORIDE 2500 MG: 5 INJECTION, POWDER, LYOPHILIZED, FOR SOLUTION INTRAVENOUS at 02:31

## 2024-03-12 RX ADMIN — FLUTICASONE PROPIONATE 2 SPRAY: 50 SPRAY, METERED NASAL at 21:42

## 2024-03-12 RX ADMIN — Medication 1 TABLET: at 08:27

## 2024-03-12 RX ADMIN — CEFEPIME 2000 MG: 2 INJECTION, POWDER, FOR SOLUTION INTRAVENOUS at 00:27

## 2024-03-12 RX ADMIN — DULOXETINE HYDROCHLORIDE 20 MG: 20 CAPSULE, DELAYED RELEASE ORAL at 08:28

## 2024-03-12 RX ADMIN — ONDANSETRON 4 MG: 2 INJECTION INTRAMUSCULAR; INTRAVENOUS at 03:25

## 2024-03-12 RX ADMIN — HYDROXYZINE HYDROCHLORIDE 50 MG: 25 TABLET, FILM COATED ORAL at 21:36

## 2024-03-12 RX ADMIN — DILTIAZEM HYDROCHLORIDE 60 MG: 60 TABLET, FILM COATED ORAL at 08:27

## 2024-03-12 RX ADMIN — SODIUM BICARBONATE 650 MG: 650 TABLET ORAL at 08:28

## 2024-03-12 RX ADMIN — PRAVASTATIN SODIUM 20 MG: 40 TABLET ORAL at 08:28

## 2024-03-12 RX ADMIN — FLUTICASONE PROPIONATE 2 SPRAY: 50 SPRAY, METERED NASAL at 08:36

## 2024-03-12 RX ADMIN — PANTOPRAZOLE SODIUM 40 MG: 40 INJECTION, POWDER, FOR SOLUTION INTRAVENOUS at 16:02

## 2024-03-12 RX ADMIN — DILTIAZEM HYDROCHLORIDE 60 MG: 60 TABLET, FILM COATED ORAL at 21:37

## 2024-03-12 RX ADMIN — CEFEPIME 2000 MG: 2 INJECTION, POWDER, FOR SOLUTION INTRAVENOUS at 11:48

## 2024-03-12 RX ADMIN — DICYCLOMINE HYDROCHLORIDE 20 MG: 10 CAPSULE ORAL at 08:27

## 2024-03-12 RX ADMIN — SODIUM BICARBONATE 1300 MG: 650 TABLET ORAL at 21:36

## 2024-03-12 ASSESSMENT — PAIN DESCRIPTION - FREQUENCY
FREQUENCY: CONTINUOUS

## 2024-03-12 ASSESSMENT — PAIN - FUNCTIONAL ASSESSMENT
PAIN_FUNCTIONAL_ASSESSMENT: PREVENTS OR INTERFERES SOME ACTIVE ACTIVITIES AND ADLS
PAIN_FUNCTIONAL_ASSESSMENT: ACTIVITIES ARE NOT PREVENTED
PAIN_FUNCTIONAL_ASSESSMENT: PREVENTS OR INTERFERES SOME ACTIVE ACTIVITIES AND ADLS
PAIN_FUNCTIONAL_ASSESSMENT: PREVENTS OR INTERFERES SOME ACTIVE ACTIVITIES AND ADLS
PAIN_FUNCTIONAL_ASSESSMENT: ACTIVITIES ARE NOT PREVENTED
PAIN_FUNCTIONAL_ASSESSMENT: PREVENTS OR INTERFERES SOME ACTIVE ACTIVITIES AND ADLS

## 2024-03-12 ASSESSMENT — PAIN DESCRIPTION - ONSET
ONSET: ON-GOING

## 2024-03-12 ASSESSMENT — PAIN DESCRIPTION - ORIENTATION
ORIENTATION: MID
ORIENTATION: LOWER
ORIENTATION: MID;LOWER
ORIENTATION: LOWER;OTHER (COMMENT)

## 2024-03-12 ASSESSMENT — PAIN DESCRIPTION - DESCRIPTORS
DESCRIPTORS: CRAMPING
DESCRIPTORS: ACHING;DULL;SHARP
DESCRIPTORS: CRAMPING
DESCRIPTORS: ACHING;DULL;SHARP
DESCRIPTORS: CRAMPING;SPASM
DESCRIPTORS: CRAMPING
DESCRIPTORS: ACHING;SHARP
DESCRIPTORS: CRAMPING

## 2024-03-12 ASSESSMENT — PAIN SCALES - GENERAL
PAINLEVEL_OUTOF10: 7

## 2024-03-12 ASSESSMENT — PAIN DESCRIPTION - LOCATION
LOCATION: ABDOMEN
LOCATION: ABDOMEN;OTHER (COMMENT)

## 2024-03-12 ASSESSMENT — PAIN DESCRIPTION - PAIN TYPE
TYPE: ACUTE PAIN
TYPE: ACUTE PAIN;CHRONIC PAIN
TYPE: ACUTE PAIN
TYPE: ACUTE PAIN

## 2024-03-12 ASSESSMENT — ENCOUNTER SYMPTOMS
BLOOD IN STOOL: 1
COUGH: 1
SHORTNESS OF BREATH: 1
VOMITING: 1
NAUSEA: 1
ABDOMINAL PAIN: 1

## 2024-03-12 NOTE — ED NOTES
ED to inpatient nurses report     Chief Complaint   Patient presents with    Shortness of Breath     X2 weeks, recent d/c from Located within [facility] for pneumonia. SOB w/ exertion.    Rectal Bleeding     Hx of GI bleed in June, noticing dark tarry stools since end of February.  Was taken off blood thinners in June.      Present to ED from home via EMS c/o SOB and rectal bleeding.  LOC: alert and orientated to name, place, date  Vital signs   Vitals:    03/11/24 2245 03/11/24 2300 03/11/24 2345 03/12/24 0000   BP: 130/79 (!) 143/83 (!) 148/82 (!) 140/88   Pulse: (!) 120 (!) 125 (!) 124 (!) 123   Resp: 15 16 22 24   Temp:       TempSrc:       SpO2: 96% 94% 94% 94%      Oxygen Baseline 94% on RA    Current needs required RA   SEPSIS:   [y] Lactate X 2 ordered (Yes or No)  [y] Antibiotics given (Yes or No)  [y] IV Fluids ordered (Yes or No)             [y] 2nd IV completed (Yes or No)  [y] Hourly Vital Signs (Validated)  [n] Outstanding Orders:     LDAs:   Peripheral IV 03/11/24 Left Antecubital (Active)   Site Assessment Clean, dry & intact 03/11/24 2208   Line Status Blood return noted;Specimen collected;Normal saline locked 03/11/24 2208   Phlebitis Assessment No symptoms 03/11/24 2208   Infiltration Assessment 0 03/11/24 2208   Dressing Status Clean, dry & intact 03/11/24 2208   Dressing Type Transparent 03/11/24 2208       Peripheral IV 03/11/24 Distal;Left Forearm (Active)   Site Assessment Clean, dry & intact 03/11/24 2359   Line Status Brisk blood return;Normal saline locked;Flushed;Specimen collected 03/11/24 2359     Mobility: Independent  Fall Risk:    Pending ED orders: vancocin 2500mg infusion  Present condition: stable  Code Status: full  Consults: PHARMACY TO DOSE VANCOMYCIN  IP CONSULT TO INTERNAL MEDICINE  []  Hospitalist  Completed  [] yes [] no Who:   [x]  Medicine  Completed  [x] yes [] No Who: Missy Chowdhury CNP  []  Cardiology  Completed  [] yes [] No Who:   []  GI   Completed  [] yes [] No Who:   []

## 2024-03-12 NOTE — RT PROTOCOL NOTE
RT Inhaler-Nebulizer Bronchodilator Protocol Note    There is a bronchodilator order in the chart from a provider indicating to follow the RT Bronchodilator Protocol and there is an “Initiate RT Inhaler-Nebulizer Bronchodilator Protocol” order as well (see protocol at bottom of note).    CXR Findings:  XR CHEST PORTABLE    Result Date: 3/11/2024  Right lower lobe pneumonia. Recommend follow-up to resolution.       The findings from the last RT Protocol Assessment were as follows:   History Pulmonary Disease: None or smoker <15 pack years  Respiratory Pattern: Dyspnea on exertion or RR 21-25 bpm  Breath Sounds: Clear breath sounds  Cough: Strong, spontaneous, non-productive  Indication for Bronchodilator Therapy: On home bronchodilators  Bronchodilator Assessment Score: 2    Aerosolized bronchodilator medication orders have been revised according to the RT Inhaler-Nebulizer Bronchodilator Protocol below.    Respiratory Therapist to perform RT Therapy Protocol Assessment initially then follow the protocol.  Repeat RT Therapy Protocol Assessment PRN for score 0-3 or on second treatment, BID, and PRN for scores above 3.    No Indications - adjust the frequency to every 6 hours PRN wheezing or bronchospasm, if no treatments needed after 48 hours then discontinue using Per Protocol order mode.     If indication present, adjust the RT bronchodilator orders based on the Bronchodilator Assessment Score as indicated below.  Use Inhaler orders unless patient has one or more of the following: on home nebulizer, not able to hold breath for 10 seconds, is not alert and oriented, cannot activate and use MDI correctly, or respiratory rate 25 breaths per minute or more, then use the equivalent nebulizer order(s) with same Frequency and PRN reasons based on the score.  If a patient is on this medication at home then do not decrease Frequency below that used at home.    0-3 - enter or revise RT bronchodilator order(s) to equivalent RT

## 2024-03-12 NOTE — H&P
[facility]  Office: [phone]  Aung Luis DO, Mansoor Cline DO, Josse Han DO, Gregory Archibald DO, Andrew Roberts MD, Mayra Medel MD, Ray Johnson MD, Romy Amador MD,  Mic Meraz MD, Rom Hayes MD, Casandra Washington MD,  Ely Burks DO, Dorie Anderson MD, Marcos Travis MD, Trey Luis DO, Delia Clemens MD,  Jorge Major DO, Gisel Herbert MD, Rose Yi MD, Celia Guillen MD, Matthew Hyatt MD,  Sammy Galvez MD, Mary Kay Karimi MD, Edson Hay MD, Mateo Turner MD, Stan Cline MD, Radha Hoskins MD, Dick Lam DO, Peter Isbell DO, Giles Cat MD,  Eran Mckenzie MD, Shirley Waterhouse, CNP,  Missy Chowdhury CNP, Mikael Cai, CNP,  Shantelle Monson, DNP, Betty Boles, CNP, Yany Garcia, CNP, Zulay Ramsay CNP, Cathie Law CNP, Lynda Russell, CNP, An King, PA-C, Zoraida Sherman, PA-C, Gabby Vaughn, CNP, Floridalma Cook, CNP, Jaclyn Neff, CNP, Patsy Peterson, CNS, Yareli Montiel, CNP, Martha Prince, CNP, Tracy Schwab, CNP         [facility]   IN-PATIENT SERVICE   [facility]    HISTORY AND PHYSICAL EXAMINATION            Date:   3/12/2024  Patient name:  Prateek Denton II  Date of admission:  3/11/2024 10:00 PM  MRN:   [id number]  Account:  [id number]  YOB: 1960  PCP:    Lisseth Coello APRN - CNP  Room:   [place] [address]  Code Status:    Prior    Chief Complaint:     Chief Complaint   Patient presents with   • Shortness of Breath     X2 weeks, recent d/c from [facility] for pneumonia. SOB w/ exertion.   • Rectal Bleeding     Hx of GI bleed in June, noticing dark tarry stools since end of February.  Was taken off blood thinners in June.       History Obtained From:     patient, electronic medical record    History of Present Illness:     Prateek Denton II is a 64 y.o. Non- / non  male who presents with Shortness of Breath (X2 weeks, recent d/c from [facility] for pneumonia. SOB

## 2024-03-12 NOTE — ED PROVIDER NOTES
effort is normal. No respiratory distress.      Breath sounds: Examination of the right-middle field reveals rhonchi. Examination of the right-lower field reveals rhonchi. Rhonchi present. No wheezing.   Abdominal:      Palpations: Abdomen is soft.      Tenderness: There is no right CVA tenderness, left CVA tenderness or rebound.      Comments: Multiple large ventral abdominal hernias, mild diffuse abdominal tenderness   Genitourinary:     Rectum: Guaiac result negative.      Comments: Normal rectal tone, no external hemorrhoids, brown stool  Musculoskeletal:         General: Normal range of motion.      Cervical back: No rigidity.   Skin:     General: Skin is warm and dry.      Capillary Refill: Capillary refill takes less than 2 seconds.   Neurological:      General: No focal deficit present.      Mental Status: He is alert and oriented to person, place, and time.   Psychiatric:         Mood and Affect: Mood normal.         Thought Content: Thought content normal.         MEDICAL DECISION MAKING/ED Course:     - EKG  - CXR  - CT a/p w/out  - CBC, CMP, lactic, lipase, trop, coags  - covid and flu swab  - sepsis protocol initiated  - IV vanc/cefepime/flagyl  - rectal exam w/hemoccult  - 1 L NS bolus followed by 30 cc/kg LR bolus  - IV fentanyl    64-year-old male presents to the ED for 2 weeks of cough, progressive shortness of breath, several days of generalized abdominal pain and dark stools.  On presentation patient was tachycardic, had rhonchi and his right lower lung fields, mild generalized abdominal tenderness.  Differential diagnosis includes pneumonia, sepsis, viral pneumonitis, ACS, gastrointestinal hemorrhage.  EKG is sinus tachycardia without ischemic changes, chest x-ray demonstrates a right lower lobe pneumonia.  COVID swab negative, patient remains positive for influenza.  Labs with significant leukocytosis of 26 K, 7 days ago white count was 8.6.  Patient's creatinine is 3.3 which is slightly above  mg    metoprolol tartrate (LOPRESSOR) tablet 100 mg    dilTIAZem (CARDIZEM) tablet 60 mg    ceFEPIme (MAXIPIME) 2,000 mg in sodium chloride 0.9 % 100 mL IVPB (mini-bag)     Order Specific Question:   Antimicrobial Indications     Answer:   Pneumonia (CAP)     Order Specific Question:   CAP duration of therapy     Answer:   7 days     CONSULTS:  PHARMACY TO DOSE VANCOMYCIN  IP CONSULT TO INTERNAL MEDICINE  IP CONSULT TO NEPHROLOGY    FINAL IMPRESSION      1. Pneumonia of right lower lobe due to infectious organism    2. Sepsis, due to unspecified organism, unspecified whether acute organ dysfunction present (HCC)          DISPOSITION/PLAN   DISPOSITION Admitted 03/12/2024 01:20:14 AM      PATIENT REFERRED TO:  No follow-up provider specified.  DISCHARGE MEDICATIONS:  Current Discharge Medication List        David Lutz MD  Attending Emergency Physician        CRITICAL CARE: There was a high probability of clinically significant/life threatening deterioration in this patient's condition which required my urgent intervention.  Total critical care time was 35 minutes.  This excludes any time for separately reportable procedures.       David Lutz MD  Emergency Medicine     David Lutz MD  03/12/24 0701

## 2024-03-12 NOTE — PROGRESS NOTES
[facility]   Pharmacy Pharmacokinetic Monitoring Service - Vancomycin     Prateek Denton II is a 64 y.o. male starting on vancomycin therapy for community acquired pneumonia . Pharmacy consulted by Dr. Ely Burks  for monitoring and adjustment.    Target Concentration: Goal AUC/DAILY 400-600 mg*hr/L    Additional Antimicrobials: cefepime 2 Gm    Pertinent Laboratory Values:   Wt Readings from Last 1 Encounters:   03/12/24 116.4 kg (256 lb 11.2 oz)     Temp Readings from Last 1 Encounters:   03/12/24 99.1 °F (37.3 °C) (Oral)     Estimated Creatinine Clearance: 34 mL/min (A) (based on SCr of 2.8 mg/dL (H)).  Recent Labs     03/11/24  2210 03/12/24  0558   CREATININE 3.3* 2.8*   BUN 56* 54*   WBC 26.7*  --      Procalcitonin: 1.69 (3/12)    Pertinent Cultures:  Culture Date Source Results   3/12 sputum pending   MRSA Nasal Swab: not ordered. Order placed by pharmacy.    Plan:  Concentration-guided dosing due to renal impairment/insufficiency  Start vancomycin 2500mg x 1, get a serum level approximate 24 hours after this dose to guide further dosing.   Renal labs as indicated   Vancomycin concentration ordered for 3/12 @ 2300   Pharmacy will continue to monitor patient and adjust therapy as indicated    Thank you for the consult,  Yareli Milligan RPH  3/12/2024 11:06 AM

## 2024-03-12 NOTE — CONSULTS
06/21/2023    Hernia of abdominal wall     History of atrial fibrillation     AFTER SURGERY ON 09/23/2020 WENT INTO A FIB. CONVERTED BACK TO NORMAL RHYTHM ON HIS OWN. CARDIOLOGIST DR STEIN    History of blood transfusion     NO REACTION    [place] (hard of hearing)     HTN     KARLO Slaughter CNP    Hyperkalemia     Hyperlipidemia     Incisional hernia     Kidney infection     Large bowel obstruction (HCC) 08/31/2020    Morbid obesity due to excess calories (HCC) 10/04/2017    Paroxysmal A-fib (HCC) 03/12/2024    SBO (small bowel obstruction) (HCC) 04/17/2023    Sepsis (HCC)     post surgery. leak in bowel    Single kidney     Sleep apnea     USES CPAP MACHINE    T2DM     KARLO Slaughter CNP. diet controlled    Tooth missing     RIGHT UPPER 2ND MOLAR    Under care of team 08/2021    Dr. Cagle [facility]    Upper GI bleed 06/21/2023    Wears glasses     READING    Wellness examination 07/2021    Lisseth Slaughter CNP [place] [facility] [place]        SURGICAL HISTORY:  Past Surgical History:   Procedure Laterality Date    ADENOIDECTOMY      TWICE AS A CHILD    ANKLE SURGERY Right 2010    RUPTURED ACHILES    CARPAL TUNNEL RELEASE Bilateral     COLONOSCOPY  04/18/2023    diagnostic    COLONOSCOPY N/A 4/18/2023    COLONOSCOPY DIAGNOSTIC performed by Cosme Hunter MD at [facility] OR    COLOSTOMY      temporary    CYSTO/URETERO/PYELOSCOPY, CALCULUS TX Right 10/30/2020    HOLMIUM-STANDBY, CYSTOSCOPY, URETEROSCOPY, STENT EXCHANGE performed by Donal Cano MD at [facility] OR    CYSTOSCOPY Right 09/01/2020    CYSTOSCOPY RIGHT URETERAL STENT INSERTION performed by Jeff Johnson MD at [facility] OR     CATH POWER PICC TRIPLE  09/03/2020    REMOVED AROUND 10/07/2020    KIDNEY SURGERY Left 09/02/2020    LAPAROSCOPIC XI ROBOTIC NEPHRECTOMY performed by Brandyn Garcia MD at [facility] OR    KNEE ARTHROSCOPY Bilateral     LAPAROTOMY N/A 09/23/2020    LAPAROTOMY EXPLORATORY performed by Jagjit Doty DO at [facility] OR    SMALL INTESTINE SURGERY N/A 09/02/2020

## 2024-03-12 NOTE — CONSULTS
[facility]  Initial Consult Note  Date: 3/12/2024    Hospital day :0     Impression:   Recent influenza A virus infection  Right lower lobe pneumonia   Acute kidney injury with underlying chronic kidney disease stage IIIb  Diarrhea-dark stools  Diabetes mellitus type 2  Obesity    Recommendations   The patient has been started on antimicrobial therapy with cefepime and vancomycin  The patient has a postviral pneumonia and will need to rule out MRSA as infection.  MRSA PCR has been ordered  I will follow his clinical progress and culture data and adjust therapy accordingly    Chief complaint/reason for consultation:   Pneumonia    History of Present Illness:   Prateek Denton II is a 64 y.o.-year-old male who was initially admitted on 3/11/2024.   Prateek has diabetes mellitus type 2, hypertension, paroxysmal atrial fibrillation, obstructive sleep apnea, obesity and was recently hospitalized with influenza A virus infection and had A-fib with RVR and acute kidney injury at that time.  He was hospitalized from 2/29/2024 through 3/5/2024 and was treated with antiviral therapy with Tamiflu during that hospital stay.    The patient reports that since his discharge he has continued to have shortness of breath, no stamina, generalized weakness, difficulty in breathing especially with minimal exertion.  The patient has been coughing with some mild while phlegm reduction and has not had any associated fevers or chills.  He does report some abdominal pain, nausea vomiting as well as diarrhea and reports that his appetite has been decreased.  Due to the worsening shortness of breath he came back into the emergency department.  The patient is also been having episodes of rectal bleeding.    Chest x-ray in the emergency room showed right lower lobe infiltrates and a CT scan of the abdomen pelvis showed distended bowel with possible ileus but no obstruction noted.  The patient was started on cefepime, Flagyl  messaging  OFFICE: [phone]    Thank you for allowing us to participate in the care of this patient. Please call with questions.     This note is created with the assistance of a speech recognition program.  While intending to generate a document that actually reflects the content of the visit, the document can still have some errors including those of syntax and sound a like substitutions which may escape proof reading.  In such instances, actual meaning can be extrapolated by contextual diversion.

## 2024-03-12 NOTE — PROGRESS NOTES
End Of Shift Note  [facility]  Summary of shift: On antibiotics for pneumonia. Diarrhea, stool sample sent, r/o cdiff, urine sent, cont abdmonial pain, bentyl d/cd, IV protonix, check H/H tonite at 9pm. Remains on clear liquid. Cont IVF 75/hr.    Vitals:    Vitals:    03/12/24 0905 03/12/24 1119 03/12/24 1200 03/12/24 1559   BP:   133/66 137/74   Pulse:  97 99 93   Resp:   20 20   Temp:  98.4 °F (36.9 °C) 97.8 °F (36.6 °C) 99 °F (37.2 °C)   TempSrc:  Temporal Oral Oral   SpO2: 94%  93% 96%   Weight:       Height:            I&O:   Intake/Output Summary (Last 24 hours) at 3/12/2024 1732  Last data filed at 3/12/2024 1323  Gross per 24 hour   Intake --   Output 1550 ml   Net -1550 ml       Resp Status: 2L NC, vipin cough    Ventilator Settings:     / / /     Critical Care IV infusions:   dextrose      sodium chloride 75 mL/hr at 03/12/24 0330    sodium chloride          LDA:   Peripheral IV 03/11/24 Left Antecubital (Active)   Number of days: 0

## 2024-03-12 NOTE — PROGRESS NOTES
End Of Shift Note  [facility]    Summary of shift: Patient had an uneventful shift after arrived to the unit early this AM. Nephrology needs to be consulted still. Per IM NP, avoid opioids as much as possible due to possible ileus; use PO bentyl instead. IM also wants patient to be up and walking at least Qshift. Sputum and urine analysis sent to lab for cultures. Blood cultures completed in ED.    Currently, he is in bed, call light in reach and bed in lowest position. He appears in NAD at this time.    Vitals:    Vitals:    03/12/24 0325 03/12/24 0329 03/12/24 0400 03/12/24 0500   BP: (!) 161/88  (!) 143/82 (!) 148/79   Pulse: (!) 132 (!) 137 (!) 135    Resp:   20    Temp:   97.9 °F (36.6 °C)    TempSrc:   Temporal    SpO2:  95% 94%    Weight:       Height:            I&O:   Intake/Output Summary (Last 24 hours) at 3/12/2024 0604  Last data filed at 3/12/2024 0400  Gross per 24 hour   Intake --   Output 675 ml   Net -675 ml       Resp Status: 2L NC    Critical Care IV infusions:   dextrose      sodium chloride 75 mL/hr at 03/12/24 0330    sodium chloride          LDA:   Peripheral IV 03/11/24 Left Antecubital (Active)   Number of days: 0       Peripheral IV 03/11/24 Distal;Left Forearm (Active)   Number of days: 0

## 2024-03-12 NOTE — PLAN OF CARE
Problem: Chronic Conditions and Co-morbidities  Goal: Patient's chronic conditions and co-morbidity symptoms are monitored and maintained or improved  3/12/2024 1026 by Jerri Miranda RN  Outcome: Progressing  Flowsheets (Taken 3/12/2024 0800)  Care Plan - Patient's Chronic Conditions and Co-Morbidity Symptoms are Monitored and Maintained or Improved:   Monitor and assess patient's chronic conditions and comorbid symptoms for stability, deterioration, or improvement   Collaborate with multidisciplinary team to address chronic and comorbid conditions and prevent exacerbation or deterioration  3/12/2024 0420 by Telma Oneal, RN  Outcome: Progressing  Flowsheets (Taken 3/12/2024 0330)  Care Plan - Patient's Chronic Conditions and Co-Morbidity Symptoms are Monitored and Maintained or Improved:   Monitor and assess patient's chronic conditions and comorbid symptoms for stability, deterioration, or improvement   Collaborate with multidisciplinary team to address chronic and comorbid conditions and prevent exacerbation or deterioration   Update acute care plan with appropriate goals if chronic or comorbid symptoms are exacerbated and prevent overall improvement and discharge     Problem: Discharge Planning  Goal: Discharge to home or other facility with appropriate resources  3/12/2024 1026 by Jerri Miranda RN  Outcome: Progressing  Flowsheets (Taken 3/12/2024 0800)  Discharge to home or other facility with appropriate resources:   Identify barriers to discharge with patient and caregiver   Arrange for needed discharge resources and transportation as appropriate  3/12/2024 0420 by Telma Oneal, RN  Outcome: Progressing  Flowsheets  Taken 3/12/2024 0330  Discharge to home or other facility with appropriate resources:   Identify barriers to discharge with patient and caregiver   Arrange for needed discharge resources and transportation as appropriate   Identify discharge learning needs (meds, wound care, etc)   Refer to

## 2024-03-12 NOTE — PROGRESS NOTES
Physical Therapy  Facility/Department: [facility]  Physical Therapy Initial Assessment    Name: Prateek Denton II  : 1960  MRN: [id number]  Date of Service: 3/12/2024  CARMELO Guthrie reports patient is medically stable for therapy treatment this date.    Chart reviewed prior to treatment and patient is agreeable for therapy.  All lines intact and patient positioned comfortably at end of treatment.  All patient needs addressed prior to ending therapy session.      Discharge Recommendations:  Patient would benefit from continued therapy after discharge   Pt presenting with new musculoskeletal dysfunction and would benefit from additional therapy at time of discharge.  Please refer to the AM-PAC score for current functional status.     Per H&P: \"Prateek Denton II is a 64 y.o. Non- / non  male who presents with Shortness of Breath (X2 weeks, recent d/c from [facility] for pneumonia. SOB w/ exertion.) and Rectal Bleeding (Hx of GI bleed in , noticing dark tarry stools since end of February./Was taken off blood thinners in .) and is admitted to the hospital for the management of Pneumonia of right lower lobe due to influenza A virus.\"      Patient Diagnosis(es): The primary encounter diagnosis was Pneumonia of right lower lobe due to infectious organism. A diagnosis of Sepsis, due to unspecified organism, unspecified whether acute organ dysfunction present (HCC) was also pertinent to this visit.  Past Medical History:  has a past medical history of Acute blood loss anemia, Arthritis, Back pain, Cellulitis, Colostomy RUQ, Frequent headaches, Gastrointestinal hemorrhage with melena, Gout, Hemorrhagic shock (HCC), Hernia of abdominal wall, History of atrial fibrillation, History of blood transfusion, [place] (hard of hearing), HTN, Hyperkalemia, Hyperlipidemia, Incisional hernia, Kidney infection, Large bowel obstruction (HCC), Morbid obesity due to excess calories (HCC), Paroxysmal A-fib (HCC), SBO (small  Inpatient CMS G-Code Modifier : CK    Functional Outcome Measure-   Single Leg Stance Test:  5 sec. (<5 sec.= fall risk)      Goals  Short Term Goals  Time Frame for Short Term Goals: 12 visits  Short Term Goal 1: Pt to demonstrate bed mobility independently  Short Term Goal 2: Pt to perform STS transfers independently  Short Term Goal 3: Pt to ambulate at least 100ft w/o AD independently  Short Term Goal 4: Pt to ascend/descend 2 stairs w/o handrails independently  Short Term Goal 5: Pt to actively participate in at least 30 minutes of physical therapy for ther act, ther ex, balance, gait, and endurance training while maintaining SpO2 > 92% throughout  Patient Goals   Patient Goals : To breathe better, to feel stronger, to go home       Education  Patient Education  Education Given To: Patient  Education Provided: Role of Therapy;Plan of Care;Energy Conservation;Fall Prevention Strategies  Education Provided Comments: Pt educated on: purpose of acute PT eval, importance of continued mobility throughout admission to prevent sedentary complications, pursed lip breathing, general safety awareness,  fall risk prevention, and PT POC.  Pt w/ fair return demo.  Education Method: Verbal  Education Outcome: Verbalized understanding      Therapy Time   Individual Concurrent Group Co-treatment   Time In 1248         Time Out 1329         Minutes 41         Treatment time: 25 minutes       Louise Flores, PT

## 2024-03-12 NOTE — CONSULTS
Pharmacy dosing of initial vancomycin ordered by ED provider.    Wt Readings from Last 1 Encounters:   03/05/24 117.4 kg (258 lb 14.4 oz)       2500 mg ordered x 1.    Pharmacy is waiting to see if vancomycin therapy is continued or stopped/changed by the admitting physician.

## 2024-03-12 NOTE — ED NOTES
Pt presents to ED via EMS c/o SOB and rectal bleeding x2 weeks. Pt was recently D/C'd from here approximately 2 weeks ago for pneumonia and flu, reports since his D/C he is SOB with exertion, and has noticed dark, tarry stools. Pt reports a Hx of a GI bleed in June, reports he was previously taking blood thinners but was taken off after the GI bleed. Reports SOB when moving around or standing. Reports he was trying to take a shower earlier today and became very worn out and SOB. EMS was called b/c the patient was sitting down on the ground, unable to stand or be helped up only by his wife.   Reports abdominal pain.   Pt wearing 4L O2 via NC per EMS, IV placed by EMS. SpO2 93% on room air upon arrival. EKG done.

## 2024-03-12 NOTE — PROGRESS NOTES
Pt admitted to room. Oriented to room, call light and bed mechanics. Side rails up x2. Call light within reach. Orders reviewed. Initial vitals obtained. Initial assessment completed. Admission database completed minus patient's medications d/t unfamiliarity with exactly what med and what dose is taken. Patient states he will inform wife to bring medication in when she visits later.

## 2024-03-12 NOTE — PROGRESS NOTES
Occupational Therapy  Facility/Department: [facility]  Occupational Therapy Initial Assessment    Name: Prateek Denton II  : 1960  MRN: [id number]  Date of Service: 3/12/2024    RN Jerri/Marissa reports patient is medically stable for therapy treatment this date.    Chart reviewed prior to treatment and patient is agreeable for therapy.  All lines intact and patient positioned comfortably at end of treatment.  All patient needs addressed prior to ending therapy session.      Discharge Recommendations:    OT Equipment Recommendations  Equipment Needed: Yes  Mobility Devices: ADL Assistive Devices  ADL Assistive Devices: Long-handled Shoe Horn;Long-handled Sponge;Emergency Alert System;Reacher;Sock-Aid Hard       Patient Diagnosis(es): The primary encounter diagnosis was Pneumonia of right lower lobe due to infectious organism. A diagnosis of Sepsis, due to unspecified organism, unspecified whether acute organ dysfunction present (HCC) was also pertinent to this visit.  Past Medical History:  has a past medical history of Acute blood loss anemia, Arthritis, Back pain, Cellulitis, Colostomy RUQ, Frequent headaches, Gastrointestinal hemorrhage with melena, Gout, Hemorrhagic shock (HCC), Hernia of abdominal wall, History of atrial fibrillation, History of blood transfusion, [place] (hard of hearing), HTN, Hyperkalemia, Hyperlipidemia, Incisional hernia, Kidney infection, Large bowel obstruction (HCC), Morbid obesity due to excess calories (HCC), Paroxysmal A-fib (HCC), SBO (small bowel obstruction) (HCC), Sepsis (HCC), Single kidney, Sleep apnea, T2DM, Tooth missing, Under care of team, Upper GI bleed, Wears glasses, and Wellness examination.  Past Surgical History:  has a past surgical history that includes Ankle surgery (Right, ); Carpal tunnel release (Bilateral); Cystoscopy (Right, 2020); hc cath power picc triple (2020); Kidney surgery (Left, 2020); Small intestine surgery (N/A, 2020);

## 2024-03-12 NOTE — PLAN OF CARE
Problem: Chronic Conditions and Co-morbidities  Goal: Patient's chronic conditions and co-morbidity symptoms are monitored and maintained or improved  Outcome: Progressing  Flowsheets (Taken 3/12/2024 0330)  Care Plan - Patient's Chronic Conditions and Co-Morbidity Symptoms are Monitored and Maintained or Improved:   Monitor and assess patient's chronic conditions and comorbid symptoms for stability, deterioration, or improvement   Collaborate with multidisciplinary team to address chronic and comorbid conditions and prevent exacerbation or deterioration   Update acute care plan with appropriate goals if chronic or comorbid symptoms are exacerbated and prevent overall improvement and discharge     Problem: Discharge Planning  Goal: Discharge to home or other facility with appropriate resources  Outcome: Progressing  Flowsheets  Taken 3/12/2024 0330  Discharge to home or other facility with appropriate resources:   Identify barriers to discharge with patient and caregiver   Arrange for needed discharge resources and transportation as appropriate   Identify discharge learning needs (meds, wound care, etc)   Refer to discharge planning if patient needs post-hospital services based on physician order or complex needs related to functional status, cognitive ability or social support system  Taken 3/12/2024 0250  Discharge to home or other facility with appropriate resources:   Identify barriers to discharge with patient and caregiver   Arrange for needed discharge resources and transportation as appropriate   Identify discharge learning needs (meds, wound care, etc)   Refer to discharge planning if patient needs post-hospital services based on physician order or complex needs related to functional status, cognitive ability or social support system     Problem: Pain  Goal: Verbalizes/displays adequate comfort level or baseline comfort level  Outcome: Progressing     Problem: Skin/Tissue Integrity  Goal: Absence of new

## 2024-03-13 ENCOUNTER — ANESTHESIA EVENT (OUTPATIENT)
Dept: OPERATING ROOM | Age: 64
End: 2024-03-13
Payer: COMMERCIAL

## 2024-03-13 LAB
ALBUMIN SERPL-MCNC: 2.5 G/DL (ref 3.5–5.2)
ALP SERPL-CCNC: 144 U/L (ref 40–129)
ALT SERPL-CCNC: 88 U/L (ref 5–41)
ANION GAP SERPL CALCULATED.3IONS-SCNC: 12 MMOL/L (ref 9–17)
AST SERPL-CCNC: 53 U/L
BASOPHILS # BLD: 0.04 K/UL (ref 0–0.2)
BASOPHILS NFR BLD: 0 % (ref 0–2)
BILIRUB SERPL-MCNC: 0.5 MG/DL (ref 0.3–1.2)
BUN SERPL-MCNC: 52 MG/DL (ref 8–23)
BUN/CREAT SERPL: 19 (ref 9–20)
C DIFF GDH + TOXINS A+B STL QL IA.RAPID: NEGATIVE
CALCIUM SERPL-MCNC: 8.5 MG/DL (ref 8.6–10.4)
CAMPYLOBACTER DNA SPEC NAA+PROBE: NORMAL
CHLORIDE SERPL-SCNC: 103 MMOL/L (ref 98–107)
CO2 SERPL-SCNC: 19 MMOL/L (ref 20–31)
CREAT SERPL-MCNC: 2.7 MG/DL (ref 0.7–1.2)
EOSINOPHIL # BLD: 0.12 K/UL (ref 0–0.44)
EOSINOPHILS RELATIVE PERCENT: 1 % (ref 1–4)
ERYTHROCYTE [DISTWIDTH] IN BLOOD BY AUTOMATED COUNT: 12.8 % (ref 11.8–14.4)
ETEC ELTA+ESTB GENES STL QL NAA+PROBE: NORMAL
FERRITIN SERPL-MCNC: 1539 NG/ML (ref 30–400)
GFR SERPL CREATININE-BSD FRML MDRD: 25 ML/MIN/1.73M2
GLUCOSE BLD-MCNC: 173 MG/DL (ref 75–110)
GLUCOSE BLD-MCNC: 177 MG/DL (ref 75–110)
GLUCOSE BLD-MCNC: 182 MG/DL (ref 75–110)
GLUCOSE BLD-MCNC: 95 MG/DL (ref 75–110)
GLUCOSE SERPL-MCNC: 133 MG/DL (ref 70–99)
HCT VFR BLD AUTO: 26.3 % (ref 40.7–50.3)
HCT VFR BLD AUTO: 26.4 % (ref 40.7–50.3)
HGB BLD-MCNC: 8.6 G/DL (ref 13–17)
HGB BLD-MCNC: 8.6 G/DL (ref 13–17)
IMM GRANULOCYTES # BLD AUTO: 0.21 K/UL (ref 0–0.3)
IMM GRANULOCYTES NFR BLD: 1 %
IRON SATN MFR SERPL: 12 % (ref 20–55)
IRON SERPL-MCNC: 14 UG/DL (ref 61–157)
LYMPHOCYTES NFR BLD: 1.26 K/UL (ref 1.1–3.7)
LYMPHOCYTES RELATIVE PERCENT: 8 % (ref 24–43)
MCH RBC QN AUTO: 29.9 PG (ref 25.2–33.5)
MCHC RBC AUTO-ENTMCNC: 32.6 G/DL (ref 28.4–34.8)
MCV RBC AUTO: 91.7 FL (ref 82.6–102.9)
MICROORGANISM SPEC CULT: NORMAL
MICROORGANISM/AGENT SPEC: NORMAL
MONOCYTES NFR BLD: 1.41 K/UL (ref 0.1–1.2)
MONOCYTES NFR BLD: 9 % (ref 3–12)
NEUTROPHILS NFR BLD: 82 % (ref 36–65)
NEUTS SEG NFR BLD: 13.49 K/UL (ref 1.5–8.1)
NRBC BLD-RTO: 0 PER 100 WBC
P SHIGELLOIDES DNA STL QL NAA+PROBE: NORMAL
PLATELET # BLD AUTO: 264 K/UL (ref 138–453)
PMV BLD AUTO: 10.3 FL (ref 8.1–13.5)
POTASSIUM SERPL-SCNC: 4.6 MMOL/L (ref 3.7–5.3)
PROT SERPL-MCNC: 6.4 G/DL (ref 6.4–8.3)
RBC # BLD AUTO: 2.88 M/UL (ref 4.21–5.77)
ROTAVIRUS ANTIGEN: NEGATIVE
SALMONELLA DNA SPEC QL NAA+PROBE: NORMAL
SHIGA TOXIN STX GENE SPEC NAA+PROBE: NORMAL
SHIGELLA DNA SPEC QL NAA+PROBE: NORMAL
SODIUM SERPL-SCNC: 134 MMOL/L (ref 135–144)
SOURCE, 60200063: NORMAL
SPECIMEN DESCRIPTION: NORMAL
TIBC SERPL-MCNC: 113 UG/DL (ref 250–450)
UNSATURATED IRON BINDING CAPACITY: 99 UG/DL (ref 112–347)
V CHOL+PARA RFBL+TRKH+TNAA STL QL NAA+PR: NORMAL
VANCOMYCIN SERPL-MCNC: 11.2 UG/ML
WBC OTHER # BLD: 16.5 K/UL (ref 3.5–11.3)
Y ENTERO RECN STL QL NAA+PROBE: NORMAL

## 2024-03-13 PROCEDURE — 6370000000 HC RX 637 (ALT 250 FOR IP): Performed by: INTERNAL MEDICINE

## 2024-03-13 PROCEDURE — 6360000002 HC RX W HCPCS: Performed by: NURSE PRACTITIONER

## 2024-03-13 PROCEDURE — 2700000000 HC OXYGEN THERAPY PER DAY

## 2024-03-13 PROCEDURE — 97110 THERAPEUTIC EXERCISES: CPT

## 2024-03-13 PROCEDURE — A4216 STERILE WATER/SALINE, 10 ML: HCPCS | Performed by: INTERNAL MEDICINE

## 2024-03-13 PROCEDURE — 82728 ASSAY OF FERRITIN: CPT

## 2024-03-13 PROCEDURE — 6360000002 HC RX W HCPCS: Performed by: INTERNAL MEDICINE

## 2024-03-13 PROCEDURE — 80053 COMPREHEN METABOLIC PANEL: CPT

## 2024-03-13 PROCEDURE — 6370000000 HC RX 637 (ALT 250 FOR IP): Performed by: NURSE PRACTITIONER

## 2024-03-13 PROCEDURE — 85018 HEMOGLOBIN: CPT

## 2024-03-13 PROCEDURE — 94761 N-INVAS EAR/PLS OXIMETRY MLT: CPT

## 2024-03-13 PROCEDURE — C9113 INJ PANTOPRAZOLE SODIUM, VIA: HCPCS | Performed by: INTERNAL MEDICINE

## 2024-03-13 PROCEDURE — 83550 IRON BINDING TEST: CPT

## 2024-03-13 PROCEDURE — 36415 COLL VENOUS BLD VENIPUNCTURE: CPT

## 2024-03-13 PROCEDURE — 2580000003 HC RX 258: Performed by: NURSE PRACTITIONER

## 2024-03-13 PROCEDURE — 85025 COMPLETE CBC W/AUTO DIFF WBC: CPT

## 2024-03-13 PROCEDURE — 97535 SELF CARE MNGMENT TRAINING: CPT

## 2024-03-13 PROCEDURE — 82947 ASSAY GLUCOSE BLOOD QUANT: CPT

## 2024-03-13 PROCEDURE — 2060000000 HC ICU INTERMEDIATE R&B

## 2024-03-13 PROCEDURE — 87641 MR-STAPH DNA AMP PROBE: CPT

## 2024-03-13 PROCEDURE — 99232 SBSQ HOSP IP/OBS MODERATE 35: CPT | Performed by: NURSE PRACTITIONER

## 2024-03-13 PROCEDURE — 2580000003 HC RX 258: Performed by: INTERNAL MEDICINE

## 2024-03-13 PROCEDURE — 83540 ASSAY OF IRON: CPT

## 2024-03-13 PROCEDURE — 85014 HEMATOCRIT: CPT

## 2024-03-13 PROCEDURE — 99232 SBSQ HOSP IP/OBS MODERATE 35: CPT | Performed by: INTERNAL MEDICINE

## 2024-03-13 RX ORDER — HYDROCODONE BITARTRATE AND ACETAMINOPHEN 5; 325 MG/1; MG/1
1 TABLET ORAL ONCE
Status: COMPLETED | OUTPATIENT
Start: 2024-03-13 | End: 2024-03-13

## 2024-03-13 RX ADMIN — DULOXETINE HYDROCHLORIDE 20 MG: 20 CAPSULE, DELAYED RELEASE ORAL at 08:43

## 2024-03-13 RX ADMIN — DILTIAZEM HYDROCHLORIDE 60 MG: 60 TABLET, FILM COATED ORAL at 08:44

## 2024-03-13 RX ADMIN — CEFEPIME 2000 MG: 2 INJECTION, POWDER, FOR SOLUTION INTRAVENOUS at 12:27

## 2024-03-13 RX ADMIN — HYDROCODONE BITARTRATE AND ACETAMINOPHEN 1 TABLET: 5; 325 TABLET ORAL at 00:34

## 2024-03-13 RX ADMIN — FLUTICASONE PROPIONATE 2 SPRAY: 50 SPRAY, METERED NASAL at 08:46

## 2024-03-13 RX ADMIN — POLYETHYLENE GLYCOL 3350 17 G: 17 POWDER, FOR SOLUTION ORAL at 20:22

## 2024-03-13 RX ADMIN — SODIUM BICARBONATE 1300 MG: 650 TABLET ORAL at 08:43

## 2024-03-13 RX ADMIN — SODIUM CHLORIDE, PRESERVATIVE FREE 10 ML: 5 INJECTION INTRAVENOUS at 20:22

## 2024-03-13 RX ADMIN — METOPROLOL TARTRATE 100 MG: 50 TABLET, FILM COATED ORAL at 20:21

## 2024-03-13 RX ADMIN — SODIUM BICARBONATE 1300 MG: 650 TABLET ORAL at 20:21

## 2024-03-13 RX ADMIN — Medication 1 TABLET: at 08:44

## 2024-03-13 RX ADMIN — CEFEPIME 2000 MG: 2 INJECTION, POWDER, FOR SOLUTION INTRAVENOUS at 00:23

## 2024-03-13 RX ADMIN — PANTOPRAZOLE SODIUM 40 MG: 40 INJECTION, POWDER, FOR SOLUTION INTRAVENOUS at 04:41

## 2024-03-13 RX ADMIN — HYDROXYZINE HYDROCHLORIDE 50 MG: 25 TABLET, FILM COATED ORAL at 20:21

## 2024-03-13 RX ADMIN — METOPROLOL TARTRATE 100 MG: 50 TABLET, FILM COATED ORAL at 08:44

## 2024-03-13 RX ADMIN — PANTOPRAZOLE SODIUM 40 MG: 40 INJECTION, POWDER, FOR SOLUTION INTRAVENOUS at 17:16

## 2024-03-13 RX ADMIN — FLUTICASONE PROPIONATE 2 SPRAY: 50 SPRAY, METERED NASAL at 20:22

## 2024-03-13 RX ADMIN — PRAVASTATIN SODIUM 20 MG: 40 TABLET ORAL at 08:45

## 2024-03-13 RX ADMIN — INSULIN GLARGINE 12 UNITS: 100 INJECTION, SOLUTION SUBCUTANEOUS at 20:21

## 2024-03-13 RX ADMIN — SODIUM CHLORIDE: 9 INJECTION, SOLUTION INTRAVENOUS at 23:52

## 2024-03-13 RX ADMIN — VANCOMYCIN HYDROCHLORIDE 1000 MG: 1 INJECTION, POWDER, LYOPHILIZED, FOR SOLUTION INTRAVENOUS at 04:49

## 2024-03-13 RX ADMIN — CEFEPIME 2000 MG: 2 INJECTION, POWDER, FOR SOLUTION INTRAVENOUS at 23:53

## 2024-03-13 RX ADMIN — GUAIFENESIN 600 MG: 600 TABLET ORAL at 20:21

## 2024-03-13 RX ADMIN — GUAIFENESIN 600 MG: 600 TABLET ORAL at 08:44

## 2024-03-13 RX ADMIN — DILTIAZEM HYDROCHLORIDE 60 MG: 60 TABLET, FILM COATED ORAL at 20:21

## 2024-03-13 ASSESSMENT — PAIN DESCRIPTION - LOCATION: LOCATION: ABDOMEN

## 2024-03-13 ASSESSMENT — PAIN SCALES - GENERAL
PAINLEVEL_OUTOF10: 0
PAINLEVEL_OUTOF10: 0
PAINLEVEL_OUTOF10: 7

## 2024-03-13 ASSESSMENT — ENCOUNTER SYMPTOMS
COUGH: 0
GASTROINTESTINAL NEGATIVE: 1
SHORTNESS OF BREATH: 1

## 2024-03-13 ASSESSMENT — PAIN DESCRIPTION - DESCRIPTORS: DESCRIPTORS: ACHING;SHARP

## 2024-03-13 NOTE — PROGRESS NOTES
NEGATIVE    Comment: Strep pneumoniae antigen not detected      Clostridium Difficile Toxin/Antigen [[id number]] Collected: 03/12/24 1323   Order Status: Sent Specimen: Stool Updated: 03/12/24 1325   Gastrointestinal Panel, Molecular [[id number]] Collected: 03/12/24 1323   Order Status: Sent Specimen: Stool Updated: 03/12/24 1325   MRSA DNA Probe, Nasal [[id number]] Collected: 03/12/24 1115   Order Status: Canceled Specimen: Nares    MRSA DNA Probe, Nasal [[id number]] Collected: 03/12/24 1045   Order Status: Sent Specimen: Nares Updated: 03/12/24 1057   Respiratory Culture [[id number]] Collected: 03/12/24 0419   Order Status: Completed Specimen: Sputum Expectorated Updated: 03/12/24 1019    Specimen Description .EXPECTORATED SPUTUM    Direct Exam < 10 EPITHELIAL CELLS/LPF     <10 NEUTROPHILS/LPF     MIXED BACTERIAL MORPHOTYPES SEEN ON GRAM STAIN.    Culture PENDING   Sputum gram stain [[id number]] Collected: 03/12/24 0245   Order Status: Canceled Specimen: Sputum Expectorated    COVID-19 & Influenza Combo [[id number]] (Abnormal) Collected: 03/11/24 2255   Order Status: Completed Specimen: Nasopharyngeal Swab Updated: 03/11/24 2347    Specimen Description .NASOPHARYNGEAL SWAB    Source .NASOPHARYNGEAL SWAB    SARS-CoV-2 RNA, RT PCR Not Detected    Comment:       Testing was performed using EVELIO Brionna SARS-CoV-2 and Influenza A/B nucleic acid assay.  This test is a multiplex Real-Time Reverse Transcriptase Polymerase Chain Reaction  (RT-PCR)-based in vitro diagnostic test intended for the qualitative detection of nucleic  acids from SARS-CoV-2,  influenza A, and influenza B in nasopharyngeal and nasal swab specimens for use under the  FDA's Emergency Use Authorization (EUA) only.       Not Detected results do not preclude SARS-CoV-2 infection and should not be used as the sole   basis for patient management decisions.  Negative results must be combined with clinical observations, patient history, and  epidemiological  information.       Fact sheet for Patients: https://www.fda.gov/media/042457/download  Fact sheet for Healthcare Providers: https://www.fda.gov/media/954765/download       Results reported to the appropriate Health Department       INFLUENZA A DETECTED Abnormal     INFLUENZA B Not Detected     MRSA Nasal Swab: was ordered by provider, awaiting results.    Recent vancomycin administrations                     vancomycin (VANCOCIN) 2,500 mg in sodium chloride 0.9 % 500 mL IVPB (mg) 2,500 mg New Bag 03/12/24 0231                    Assessment:  Date/Time Current Dose Concentration Timing of Concentration (h) AUC   3/12 @ 5158 TBD 11.2 20.75 N/a   Note: Serum concentrations collected for AUC dosing may appear elevated if collected in close proximity to the dose administered, this is not necessarily an indication of toxicity    Plan:  Random level was done due to renal insufficiency. However this is patient's baseline. Will start pt on a regular regimen.   Start 1000mg q24h.   Anticipated AUC = 507 and trough = 16.7  Repeat vancomycin concentration ordered for 3/14 @ 1200   Pharmacy will continue to monitor patient and adjust therapy as indicated    Thank you for the consult,  Tulio Flores [facility]  3/13/2024 3:20 AM

## 2024-03-13 NOTE — PLAN OF CARE
Problem: Chronic Conditions and Co-morbidities  Goal: Patient's chronic conditions and co-morbidity symptoms are monitored and maintained or improved  Outcome: Progressing     Problem: Discharge Planning  Goal: Discharge to home or other facility with appropriate resources  Outcome: Progressing     Problem: Pain  Goal: Verbalizes/displays adequate comfort level or baseline comfort level  Outcome: Progressing     Problem: Skin/Tissue Integrity  Goal: Absence of new skin breakdown  Description: 1.  Monitor for areas of redness and/or skin breakdown  2.  Assess vascular access sites hourly  3.  Every 4-6 hours minimum:  Change oxygen saturation probe site  4.  Every 4-6 hours:  If on nasal continuous positive airway pressure, respiratory therapy assess nares and determine need for appliance change or resting period.  Outcome: Progressing     Problem: Safety - Adult  Goal: Free from fall injury  Outcome: Progressing     Problem: ABCDS Injury Assessment  Goal: Absence of physical injury  Outcome: Progressing     Problem: Respiratory - Adult  Goal: Achieves optimal ventilation and oxygenation  Outcome: Progressing     Problem: Cardiovascular - Adult  Goal: Maintains optimal cardiac output and hemodynamic stability  Outcome: Progressing  Goal: Absence of cardiac dysrhythmias or at baseline  Outcome: Progressing     Problem: Infection - Adult  Goal: Absence of infection at discharge  Outcome: Progressing     Problem: Hematologic - Adult  Goal: Maintains hematologic stability  Outcome: Progressing     Problem: Gastrointestinal - Adult  Goal: Minimal or absence of nausea and vomiting  Outcome: Progressing  Goal: Maintains or returns to baseline bowel function  Outcome: Progressing  Goal: Maintains adequate nutritional intake  Outcome: Progressing

## 2024-03-13 NOTE — PROGRESS NOTES
patient is also been having episodes of rectal bleeding.     Chest x-ray in the emergency room showed right lower lobe infiltrates and a CT scan of the abdomen pelvis showed distended bowel with possible ileus but no obstruction noted.  The patient was started on cefepime, Flagyl and vancomycin in the emergency room for the pneumonia/sepsis and I was asked to evaluate and help with antibiotic choice.    Current evaluation:3/13/2024    BP (!) 147/67   Pulse (!) 107   Temp 98.6 °F (37 °C) (Temporal)   Resp 18   Ht 1.803 m (5' 11\")   Wt 116.4 kg (256 lb 11.2 oz)   SpO2 99%   BMI 35.80 kg/m²     Temperature Range: Temp: 98.6 °F (37 °C) Temp  Av.2 °F (36.8 °C)  Min: 97.8 °F (36.6 °C)  Max: 99 °F (37.2 °C)    The patient was seen and evaluated lying in bed  States he is exhausted but otherwise no complaints  Only gets short of breath with exertion  No fevers or chills    Review of Systems   Constitutional:  Positive for fatigue.   HENT: Negative.     Respiratory:  Positive for shortness of breath (Dyspnea on exertion). Negative for cough.    Cardiovascular: Negative.    Gastrointestinal: Negative.    Genitourinary: Negative.    Musculoskeletal: Negative.    Skin: Negative.    Neurological: Negative.    Psychiatric/Behavioral: Negative.         Physical Examination :     Physical Exam  Constitutional:       Appearance: Normal appearance. He is obese.   HENT:      Head: Normocephalic and atraumatic.   Cardiovascular:      Rate and Rhythm: Normal rate and regular rhythm.   Pulmonary:      Effort: Pulmonary effort is normal. No respiratory distress.      Breath sounds: Normal breath sounds.   Abdominal:      General: Abdomen is flat. Bowel sounds are normal.      Palpations: Abdomen is soft.   Musculoskeletal:         General: No swelling. Normal range of motion.   Skin:     General: Skin is warm and dry.   Neurological:      General: No focal deficit present.      Mental Status: He is alert and oriented to  call with questions.    This note is created with the assistance of a speech recognition program.  While intending to generate a document that actually reflects the content of the visit, the document can still have some errors including those of syntax and sound a like substitutions which may escape proof reading.  In such instances, actual meaning can be extrapolated by contextual diversion.

## 2024-03-13 NOTE — PROGRESS NOTES
End Of Shift Note  [facility]  Summary of shift: Patient scheduled for EGD tomorrow; NPO at 0000. Continues to receive PPI and Cefepime. Patient experiences dyspnea with exertion, desaturates to mid to high 80's and rebounds quickly. New MRSA swab sent this morning per request.     Vitals:    Vitals:    03/13/24 1500 03/13/24 1547 03/13/24 1600 03/13/24 1700   BP:  126/66     Pulse: 90 85 85 86   Resp:  18     Temp:  98.7 °F (37.1 °C)     TempSrc:  Oral     SpO2:  94%     Weight:       Height:            I&O:   Intake/Output Summary (Last 24 hours) at 3/13/2024 1727  Last data filed at 3/13/2024 1543  Gross per 24 hour   Intake 6134.75 ml   Output 2150 ml   Net 3984.75 ml       Resp Status: 2L NC     Ventilator Settings:     / / /     Critical Care IV infusions:   dextrose      sodium chloride Stopped (03/13/24 1227)    sodium chloride          LDA:   Peripheral IV 03/13/24 Left Cephalic (Active)   Number of days: 0

## 2024-03-13 NOTE — PROGRESS NOTES
Occupational Therapy  Facility/Department: [facility]  Rehabilitation Occupational Therapy Daily Treatment Note    Date: 3/13/24  Patient Name: Prateek Denton II       Room:   MRN: [id number]  Account: [id number]   : 1960  (64 y.o.) Gender: male     Past Medical History:  has a past medical history of Acute blood loss anemia, Arthritis, Back pain, Cellulitis, Colostomy RUQ, Frequent headaches, Gastrointestinal hemorrhage with melena, Gout, Hemorrhagic shock (HCC), Hernia of abdominal wall, History of atrial fibrillation, History of blood transfusion, [place] (hard of hearing), HTN, Hyperkalemia, Hyperlipidemia, Incisional hernia, Kidney infection, Large bowel obstruction (HCC), Morbid obesity due to excess calories (HCC), Paroxysmal A-fib (HCC), SBO (small bowel obstruction) (HCC), Sepsis (HCC), Single kidney, Sleep apnea, T2DM, Tooth missing, Under care of team, Upper GI bleed, Wears glasses, and Wellness examination.  Past Surgical History:   has a past surgical history that includes Ankle surgery (Right, ); Carpal tunnel release (Bilateral); Cystoscopy (Right, 2020); hc cath power picc triple (2020); Kidney surgery (Left, 2020); Small intestine surgery (N/A, 2020); laparotomy (N/A, 2020); Tonsillectomy; Knee arthroscopy (Bilateral); Adenoidectomy; cysto/uretero/pyeloscopy, calculus tx (Right, 10/30/2020); colostomy; Small intestine surgery (N/A, 2020); Tympanostomy tube placement; Upper gastrointestinal endoscopy (N/A, 2022); Colonoscopy (2023); Colonoscopy (N/A, 2023); Upper gastrointestinal endoscopy (N/A, 2023); Upper gastrointestinal endoscopy (N/A, 2023); Upper gastrointestinal endoscopy (N/A, 2023); and Upper gastrointestinal endoscopy (N/A, 2023).    Restrictions  Restrictions/Precautions: General Precautions, Fall Risk, Contact Precautions, Isolation  Other position/activity restrictions: Up w/ assist, O2 NC, LUE  Independent  Skilled Clinical Factors: Pt. able to scoot forward on EOB with use of bedrails with independence.   OT Exercises  Exercise Treatment: Pt. completed B UE exercises while sitting upright on EOB. Pt. completed 2 sets of 10 reps each of shoulder flexion/extension, elbow flexion/extension, and ab/adduction of both shoulders. Pt's O2 monitored decreasing to 86% with activity. O2 recovered to healthy levels with rest break to 93-94%. Pt. did display increased coughing with activity but did complete each set with success with extra time.     Assessment  Assessment  Assessment: Pt. toletated sitting EOB 20 min and completing functional B UE exercises. Pt. did display increased fatigue with coughing with mobility but recovered with short rest break. O2 monitored and when fatigued decreased to 86% but recovered to 93-94% with rest. Pt. attentive and receptive to education on AE. Skilled OT warranted to promote I/safety to return pt to prior living arrangement with assist as needed.  Activity Tolerance: Patient limited by endurance  Discharge Recommendations: Patient would benefit from continued therapy after discharge  OT Equipment Recommendations  Equipment Needed: Yes  ADL Assistive Devices: Long-handled Shoe Horn;Long-handled Sponge;Emergency Alert System;Reacher;Sock-Aid Hard  Safety Devices  Safety Devices in place: Yes  Type of devices: All fall risk precautions in place;Call light within reach;Nurse notified;Left in bed  Restraints  Initially in place: No    Patient Education  Education  Education Given To: Patient  Education Provided: Role of Therapy;Safety;Home Exercise Program;ADL Function;DME/Home Modifications;Precautions  Education Provided Comments: Pt. educated on the importance of mobility and changing positions to increase circulation and endurance. Pt. was attentive and receptive to information. Pt. educated on various AE to use to promote I/safety at home.  Education Method:

## 2024-03-13 NOTE — PROGRESS NOTES
Reason for Consult:  Acute kidney injury.    Interval Hx:    F/U for DEBBI on CKD 3b  Diarrhea is improving  Cr don to 2.7  Na 134, K 4.6, bicarb 19  UOP 1450  C/O mild dyspnea at rest  Hgb down to 8.6    HISTORY OF PRESENT ILLNESS:    The patient is a 64 y.o. male who has history of CKD stage 3B presents with nausea, vomiting, diarrhea, poor appetite. On previous labs his serum creatinines have been between 1.8 to 1.9 with eGFR of 30-40s ml/min. On admission his creatinine was elevated at 3.3 that has improved to 2.8 today with iv hydration. His potassium was 4.3 with a CO2 level of 18. Denies any difficulty with urination. Denies any recent use of NSAIDs or iv contrast.     Review Of Systems:   Constitutional: No fever, chills, lethargy, weakness or wt loss.  HEENT:  No headache, nasal discharge or sore throat.  Cardiac:  No chest pain, dyspnea, orthopnea or PND.  Chest:              No cough, phlegm or wheezing.  Abdomen:  As above.  Neuro:  No gross focal weakness, numbness, abnormal movements or seizure like activity.  Skin:   No rashes or itching.  :   No hematuria, pyuria, dysuria or flank pain.  Extremities:  No swelling or joint pains.  Endocrine: No polyuria, polydypsia, or thyroid problems.  Hematology:    No bleeding disorders, bruising or anemia.  All other ROS is negative.    Past Medical History:   Diagnosis Date    Acute blood loss anemia 01/05/2022    Arthritis     BILATERAL KNEESand right shoulder    Back pain     Cellulitis 07/14/2015    Colostomy RUQ 09/23/2020    Frequent headaches     10/28/2020 PATIENT STATES EVERY OTHER DAY.    Gastrointestinal hemorrhage with melena 01/05/2022    Gout     Hemorrhagic shock (HCC) 06/21/2023    Hernia of abdominal wall     History of atrial fibrillation     AFTER SURGERY ON 09/23/2020 WENT INTO A FIB. CONVERTED BACK TO NORMAL RHYTHM ON HIS OWN. CARDIOLOGIST DR STEIN    History of blood transfusion     NO REACTION    [place] (hard of hearing)     HTN     KARLO Slaughter,    DULoxetine (CYMBALTA) 20 MG extended release capsule TAKE 1 CAPSULE BY MOUTH EVERY DAY 12/18/23   Lisseth Coello APRN - CNP   hydrOXYzine HCl (ATARAX) 50 MG tablet TAKE 1 TABLET BY MOUTH EVERY DAY AT NIGHT 12/18/23   Lisseth Coello APRN - CNP   dilTIAZem (CARDIZEM) 60 MG tablet TAKE 1 TABLET BY MOUTH IN THE MORNING AND 1 TABLET IN THE EVENING. 12/18/23   Lisseth Coello APRN - CNP   pravastatin (PRAVACHOL) 20 MG tablet TAKE 1 TABLET BY MOUTH EVERY DAY 12/3/23   Lisseth Coello APRN - CNP   insulin glargine (LANTUS SOLOSTAR) 100 UNIT/ML injection pen Inject 20-30 units of insulin subcutaneously once daily as directed 10/24/23   Lisseth Coello APRN - CNP   Insulin Pen Needle ([employer] PEN NEEDLES 31G) 31G X 8 MM MISC Use to inject insulin up to 4 times daily 6/26/23   Lisseth Coello APRN - CNP   vitamin E 1000 units capsule Take 1 capsule by mouth in the morning and at bedtime    Souleymane Limon MD   Lancets MISC 1 each by Does not apply route 3 times daily 1/3/23   Lisseth Coello APRN - CNP   Cholecalciferol (VITAMIN D) 50 MCG (2000 UT) CAPS capsule Take 2,000 Units by mouth daily    Souleymane Limon MD   Blood Glucose Monitoring Suppl ([employer] BLOOD GLUCOSE METER) w/Device KIT 1 each by Does not apply route in the morning and at bedtime 11/15/22   Lisseth Coello APRN - CNP   metoprolol (LOPRESSOR) 100 MG tablet Take 1 tablet by mouth 2 times daily 11/3/21   Souleymane Limon MD   CPAP Machine MISC use as directed    Souleymane Limon MD   Respiratory Therapy Supplies (CARETOUCH CPAP & BIPAP HOSE) MISC Mask and Tubing 1/28/20   Souleymane Limon MD   Melatonin 10 MG TABS Take 1 tablet by mouth nightly as needed    Souleymane Limon MD   Multiple Vitamins-Minerals (THERAPEUTIC MULTIVITAMIN-MINERALS) tablet Take 1 tablet by mouth daily 10/3/20   Helio Uribe MD   vitamin C (ASCORBIC ACID) 500 MG tablet Take 2 tablets by mouth 3 times daily    Provider Historical,

## 2024-03-13 NOTE — PROGRESS NOTES
[facility]  Office: [phone]  Aung Luis DO, Mansoor Cline DO, Josse Han DO, Gregory Archibald DO, Andrew Roberts MD, Mayra Mdeel MD, Ray Johnson MD, Romy Amador MD,  Mic Meraz MD, Rom Hayes MD, Casandra Washington MD,  Ely Burks DO, Dorie Anderson MD, Marcos Travis MD, Trey Luis DO, Delia Clemens MD,  Jorge Major DO, Gisel Herbert MD, Rose Yi MD, Celia Guillen MD, Matthew Hyatt MD,  Sammy Galvez MD, Mary Kay Karimi MD, Edson Hay MD, Mateo Turner MD, Stan Cline MD, Radha Hoskins MD, Dick Lam DO, Peter Isbell DO, Giles Cat MD,  Eran Mckenzie MD, Shirley Waterhouse, CNP,  Missy Chowdhury, CNP, Mikael Cai, CNP,  Shantelle Monson, DNP, Betty Boles, CNP, Yany Garcia, CNP, Zulay Ramsay CNP, Cathie Law, CNP, Lynda Russell, CNP, An King, PA-C, Zoraida Sherman, PA-C, Gabby Vaughn, CNP, Floridalma Cook, CNP, Jaclyn Neff, CNP, Patsy Peterson, CNS, Yareli Montiel, CNP, Martha Prince, CNP, Tracy Schwab, CNP         [facility]   IN-PATIENT SERVICE   [facility]    Progress Note    3/13/2024    1:27 PM    Name:   Prateek Denton II  MRN:     [id number]     Acct:      [id number]   Room:     IP Day:  1  Admit Date:  3/11/2024 10:00 PM    PCP:   Lisseth Coello, TORREY - CNP  Code Status:  Full Code    Subjective:     Pt feels better today.   Diarrhea has resolved, oral intake has improved.  His creatinine is slightly better.  Hemoglobin good 8.6 from 10.7 yesterday but has not had a bowel movement.  No fevers overnight    MRSA nasal swab was  so had to be redone today       Brief History:     64-year-old male presents to the hospital for evaluation of shortness of breath, fatigue, nausea, abdominal pain and cough.  Was recently diagnosed with influenza A.  On admission x-ray showed right middle lobe/right lower lobe infiltrate.  There was concern for postviral pneumonia so

## 2024-03-13 NOTE — PROGRESS NOTES
End Of Shift Note  [place] [facility]  Summary of shift: VSS. Norco given to relieve abdominal pain x1 at 0030. MRSA swab sent to [facility] , need to order and send another sample. Hemoglobin stable overnight, now has Q12hr H+Hs. Still gets SOB when patient gets up to the bathroom.      Vitals:    Vitals:    24 2300 24 0032 24 0100 24 0353   BP:  130/67  139/68   Pulse: 64 75 65 79   Resp:  19  17   Temp:  98.2 °F (36.8 °C)  97.8 °F (36.6 °C)   TempSrc:  Oral  Oral   SpO2:  95% 95% 98%   Weight:       Height:            I&O:   Intake/Output Summary (Last 24 hours) at 3/13/2024 0630  Last data filed at 3/13/2024 0115  Gross per 24 hour   Intake 3646.47 ml   Output 2450 ml   Net 1196.47 ml       Resp Status: 2 L    Ventilator Settings:     / / /     Critical Care IV infusions:   dextrose      sodium chloride 75 mL/hr at 249    sodium chloride          LDA:   Peripheral IV 24 Left Antecubital (Active)   Number of days: 1

## 2024-03-13 NOTE — PROGRESS NOTES
Comprehensive Nutrition Assessment    Type and Reason for Visit:  Initial, Positive Nutrition Screen    Nutrition Recommendations/Plan:   Provide diet rx as ordered   Provide supplement as ordered   Monitor labs as they become available   Monitor skin integrity/weights     Malnutrition Assessment:  Malnutrition Status:  No malnutrition (03/13/24 1101)    Context:        Findings of the 6 clinical characteristics of malnutrition:  Energy Intake:     Weight Loss:        Body Fat Loss:        Muscle Mass Loss:       Fluid Accumulation:        Strength:       Nutrition Assessment:    64 y.o. male who has history of CKD stage 3B presents with nausea, vomiting, diarrhea, poor appetite. On previous labs his serum creatinines have been between 1.8 to 1.9 with eGFR of 30-40s ml/min. On admission his creatinine was elevated at 3.3 which improved with IV hydration. Seen by this writer for weight loss and poor appetite. 3/12 weight was 256#, weight history 3/1 was 254# had a 2# gain x 11 days, 2/27 was 264# had a 8#/3% loss x 3 weeks, 8/9 was 259# had a 3# loss x 7 months. weight loss is not significant. Patient is noted on a clear liquid diet at this time, will add ensure clear once daily to aid with weight maintenance. This proivdes 200 kcals and 9 grams of protien if 100% consumed.    Nutrition Related Findings:    BM 3/12 active bowel sounds. Wound Type: None       Current Nutrition Intake & Therapies:    Average Meal Intake: 26-50%     ADULT DIET; Clear Liquid; Low Potassium (Less than 3000 mg/day)    Anthropometric Measures:  Height: 180.3 cm (5' 11\")  Ideal Body Weight (IBW): 172 lbs (78 kg)       Current Body Weight: 116.1 kg (256 lb), 148.8 % IBW. Weight Source: Bed Scale  Current BMI (kg/m2): 35.7        Weight Adjustment For: No Adjustment                 BMI Categories: Obese Class 2 (BMI 35.0 -39.9)    Estimated Daily Nutrient Needs:  Energy Requirements Based On: Kcal/kg  Weight Used for Energy Requirements:

## 2024-03-13 NOTE — PROGRESS NOTES
Gastroenterology Progress Note      Patient:   Prateek Denton II   :    1960   Facility:   [facility]  Date:     3/13/2024  Consultant:   MIGUELANGEL CÁRDENAS      Subjective:     Patient seen and examined. Some sob with activity, denies CP, hgb stable.   Stool micro negative for infectious etiologies of diarrhea.   Intermittent Norco for belly pain    On clears.       Objective:   Vital Signs:  BP (!) 150/76   Pulse 88   Temp 99.3 °F (37.4 °C) (Temporal)   Resp 18   Ht 1.803 m (5' 11\")   Wt 116.4 kg (256 lb 11.2 oz)   SpO2 93%   BMI 35.80 kg/m²      Physical Exam:   General appearance: Alert, NAD  Lungs: diminished bases  Heart:S1S2 RRR  Abdomen: Soft, some ttp epigastrium, ND present bs  Skin:  No jaundice, no stigmata of chronic liver disease.    Lab and Imaging Review   CBC:   Lab Results   Component Value Date/Time    WBC 16.5 2024 03:35 AM    RBC 2.88 2024 03:35 AM    HGB 8.6 2024 03:35 AM    HCT 26.4 2024 03:35 AM    MCV 91.7 2024 03:35 AM    MCH 29.9 2024 03:35 AM    MCHC 32.6 2024 03:35 AM    RDW 12.8 2024 03:35 AM     2024 03:35 AM    MPV 10.3 2024 03:35 AM     CBC with Differential:    Lab Results   Component Value Date/Time    WBC 16.5 2024 03:35 AM    RBC 2.88 2024 03:35 AM    HGB 8.6 2024 03:35 AM    HCT 26.4 2024 03:35 AM     2024 03:35 AM    MCV 91.7 2024 03:35 AM    MCH 29.9 2024 03:35 AM    MCHC 32.6 2024 03:35 AM    RDW 12.8 2024 03:35 AM    LYMPHOPCT 8 2024 03:35 AM    MONOPCT 9 2024 03:35 AM    BASOPCT 0 2024 03:35 AM    MONOSABS 1.41 2024 03:35 AM    LYMPHSABS 1.26 2024 03:35 AM    EOSABS 0.12 2024 03:35 AM    BASOSABS 0.04 2024 03:35 AM    DIFFTYPE NOT REPORTED 2022 04:12 PM     Platelets:    Lab Results   Component Value Date/Time     2024 03:35 AM      Hemoglobin/Hematocrit:    Lab Results   Component Value Date/Time    HGB 8.6 03/13/2024 03:35 AM    HCT 26.4 03/13/2024 03:35 AM     CMP:    Lab Results   Component Value Date/Time     03/13/2024 03:35 AM    K 4.6 03/13/2024 03:35 AM     03/13/2024 03:35 AM    CO2 19 03/13/2024 03:35 AM    BUN 52 03/13/2024 03:35 AM    CREATININE 2.7 03/13/2024 03:35 AM    GFRAA 40 01/05/2022 08:53 AM    AGRATIO 1.1 04/18/2023 05:46 AM    LABGLOM 25 03/13/2024 03:35 AM    GLUCOSE 133 03/13/2024 03:35 AM    PROT 6.4 03/13/2024 03:35 AM    LABALBU 2.5 03/13/2024 03:35 AM    CALCIUM 8.5 03/13/2024 03:35 AM    BILITOT 0.5 03/13/2024 03:35 AM    ALKPHOS 144 03/13/2024 03:35 AM    AST 53 03/13/2024 03:35 AM    ALT 88 03/13/2024 03:35 AM     BMP:    Lab Results   Component Value Date/Time     03/13/2024 03:35 AM    K 4.6 03/13/2024 03:35 AM     03/13/2024 03:35 AM    CO2 19 03/13/2024 03:35 AM    BUN 52 03/13/2024 03:35 AM    LABALBU 2.5 03/13/2024 03:35 AM    CREATININE 2.7 03/13/2024 03:35 AM    CALCIUM 8.5 03/13/2024 03:35 AM    GFRAA 40 01/05/2022 08:53 AM    LABGLOM 25 03/13/2024 03:35 AM    GLUCOSE 133 03/13/2024 03:35 AM     Sodium:    Lab Results   Component Value Date/Time     03/13/2024 03:35 AM     Potassium:    Lab Results   Component Value Date/Time    K 4.6 03/13/2024 03:35 AM     BUN/Creatinine:    Lab Results   Component Value Date/Time    BUN 52 03/13/2024 03:35 AM    CREATININE 2.7 03/13/2024 03:35 AM     Hepatic Function Panel:    Lab Results   Component Value Date/Time    ALKPHOS 144 03/13/2024 03:35 AM    ALT 88 03/13/2024 03:35 AM    AST 53 03/13/2024 03:35 AM    PROT 6.4 03/13/2024 03:35 AM    BILITOT 0.5 03/13/2024 03:35 AM    BILIDIR 0.24 09/02/2020 12:20 AM    IBILI 0.19 09/02/2020 12:20 AM    LABALBU 2.5 03/13/2024 03:35 AM     PT/INR:    Lab Results   Component Value Date/Time    PROTIME 15.5 03/11/2024 10:10 PM    INR 1.2 03/11/2024 10:10 PM         Assessment:   63 yo male

## 2024-03-13 NOTE — PROGRESS NOTES
Physical Therapy  DATE: 3/13/2024    NAME: Prateek Denton II  MRN: [id number]   : 1960    Patient not seen this date for Physical Therapy due to:      [] Cancel by RN or physician due to:    [] Hemodialysis    [] Critical Lab Value Level     [] Blood transfusion in progress    [] Acute or unstable cardiovascular status   _MAP < 55 or more than >115  _HR < 40 or > 130    [] Acute or unstable pulmonary status   -FiO2 > 60%   _RR < 5 or >40    _O2 sats < 85%    [] Strict Bedrest    [] Off Unit for surgery or procedure    [] Off Unit for testing       [] Pending imaging to R/O fracture    [x] Refusal by Patient (Patient sleeping upon arrival but easily woken up. Not agreeable for any out of bed activity as he expresses increased fatigued from OT treatment and several trips to the bathroom with his O2 desat'ing. Patient declined bed exercises. Requested to be seen earlier in the day as he fatigues in the afternoon. PT will continued to follow)      [] Other      [] PT being discontinued at this time. Patient independent. No further needs.     [] PT being discontinued at this time as the patient has been transferred to hospice care. No further needs.      Laurence Walker, PTA

## 2024-03-13 NOTE — PLAN OF CARE
Problem: Chronic Conditions and Co-morbidities  Goal: Patient's chronic conditions and co-morbidity symptoms are monitored and maintained or improved  3/13/2024 1724 by Gertrude Hernandez RN  Outcome: Progressing  3/13/2024 0512 by Roc Vines RN  Outcome: Progressing     Problem: Discharge Planning  Goal: Discharge to home or other facility with appropriate resources  3/13/2024 1724 by Gertrude Hernandez RN  Outcome: Progressing  3/13/2024 0512 by Roc Vines RN  Outcome: Progressing     Problem: Pain  Goal: Verbalizes/displays adequate comfort level or baseline comfort level  3/13/2024 1724 by Gertrude Hernandez RN  Outcome: Progressing  3/13/2024 0512 by Roc Vines RN  Outcome: Progressing     Problem: Skin/Tissue Integrity  Goal: Absence of new skin breakdown  Description: 1.  Monitor for areas of redness and/or skin breakdown  2.  Assess vascular access sites hourly  3.  Every 4-6 hours minimum:  Change oxygen saturation probe site  4.  Every 4-6 hours:  If on nasal continuous positive airway pressure, respiratory therapy assess nares and determine need for appliance change or resting period.  3/13/2024 1724 by Gertrude Hernandez RN  Outcome: Progressing  3/13/2024 0512 by Roc Vines RN  Outcome: Progressing     Problem: Safety - Adult  Goal: Free from fall injury  3/13/2024 1724 by Gertrude Hernandez RN  Outcome: Progressing  3/13/2024 0512 by Roc Vines RN  Outcome: Progressing     Problem: ABCDS Injury Assessment  Goal: Absence of physical injury  3/13/2024 1724 by Gertrude Hernandez RN  Outcome: Progressing  3/13/2024 0512 by Roc Vines RN  Outcome: Progressing     Problem: Respiratory - Adult  Goal: Achieves optimal ventilation and oxygenation  3/13/2024 1724 by Gertrude Hernandez RN  Outcome: Progressing  3/13/2024 0512 by Roc Vines RN  Outcome: Progressing     Problem: Cardiovascular - Adult  Goal: Maintains optimal cardiac output and hemodynamic

## 2024-03-14 ENCOUNTER — ANESTHESIA (OUTPATIENT)
Dept: OPERATING ROOM | Age: 64
End: 2024-03-14
Payer: COMMERCIAL

## 2024-03-14 ENCOUNTER — APPOINTMENT (OUTPATIENT)
Dept: GENERAL RADIOLOGY | Age: 64
DRG: 871 | End: 2024-03-14
Payer: COMMERCIAL

## 2024-03-14 LAB
ALBUMIN SERPL-MCNC: 2.4 G/DL (ref 3.5–5.2)
ALP SERPL-CCNC: 126 U/L (ref 40–129)
ALT SERPL-CCNC: 82 U/L (ref 5–41)
ANION GAP SERPL CALCULATED.3IONS-SCNC: 9 MMOL/L (ref 9–17)
AST SERPL-CCNC: 48 U/L
BASOPHILS # BLD: 0.03 K/UL (ref 0–0.2)
BASOPHILS NFR BLD: 0 % (ref 0–2)
BILIRUB SERPL-MCNC: 0.5 MG/DL (ref 0.3–1.2)
BUN SERPL-MCNC: 47 MG/DL (ref 8–23)
BUN/CREAT SERPL: 17 (ref 9–20)
CALCIUM SERPL-MCNC: 8.5 MG/DL (ref 8.6–10.4)
CHLORIDE SERPL-SCNC: 107 MMOL/L (ref 98–107)
CO2 SERPL-SCNC: 19 MMOL/L (ref 20–31)
CREAT SERPL-MCNC: 2.7 MG/DL (ref 0.7–1.2)
EOSINOPHIL # BLD: 0.14 K/UL (ref 0–0.44)
EOSINOPHILS RELATIVE PERCENT: 1 % (ref 1–4)
ERYTHROCYTE [DISTWIDTH] IN BLOOD BY AUTOMATED COUNT: 12.7 % (ref 11.8–14.4)
GFR SERPL CREATININE-BSD FRML MDRD: 25 ML/MIN/1.73M2
GLUCOSE BLD-MCNC: 115 MG/DL (ref 75–110)
GLUCOSE BLD-MCNC: 117 MG/DL (ref 75–110)
GLUCOSE BLD-MCNC: 132 MG/DL (ref 75–110)
GLUCOSE BLD-MCNC: 140 MG/DL (ref 75–110)
GLUCOSE SERPL-MCNC: 132 MG/DL (ref 70–99)
HCT VFR BLD AUTO: 24.7 % (ref 40.7–50.3)
HCT VFR BLD AUTO: 26.9 % (ref 40.7–50.3)
HGB BLD-MCNC: 8.6 G/DL (ref 13–17)
HGB BLD-MCNC: 8.7 G/DL (ref 13–17)
IMM GRANULOCYTES # BLD AUTO: 0.23 K/UL (ref 0–0.3)
IMM GRANULOCYTES NFR BLD: 2 %
LYMPHOCYTES NFR BLD: 1 K/UL (ref 1.1–3.7)
LYMPHOCYTES RELATIVE PERCENT: 8 % (ref 24–43)
MCH RBC QN AUTO: 29.6 PG (ref 25.2–33.5)
MCHC RBC AUTO-ENTMCNC: 32.3 G/DL (ref 28.4–34.8)
MCV RBC AUTO: 91.5 FL (ref 82.6–102.9)
MONOCYTES NFR BLD: 1.07 K/UL (ref 0.1–1.2)
MONOCYTES NFR BLD: 9 % (ref 3–12)
MRSA, DNA, NASAL: NEGATIVE
NEUTROPHILS NFR BLD: 80 % (ref 36–65)
NEUTS SEG NFR BLD: 9.73 K/UL (ref 1.5–8.1)
NRBC BLD-RTO: 0 PER 100 WBC
PLATELET # BLD AUTO: 263 K/UL (ref 138–453)
PMV BLD AUTO: 10.5 FL (ref 8.1–13.5)
POTASSIUM SERPL-SCNC: 4.2 MMOL/L (ref 3.7–5.3)
PROT SERPL-MCNC: 6.8 G/DL (ref 6.4–8.3)
RBC # BLD AUTO: 2.94 M/UL (ref 4.21–5.77)
SODIUM SERPL-SCNC: 135 MMOL/L (ref 135–144)
SPECIMEN DESCRIPTION: NORMAL
VANCOMYCIN SERPL-MCNC: 18 UG/ML
WBC OTHER # BLD: 12.2 K/UL (ref 3.5–11.3)

## 2024-03-14 PROCEDURE — 85018 HEMOGLOBIN: CPT

## 2024-03-14 PROCEDURE — 99232 SBSQ HOSP IP/OBS MODERATE 35: CPT | Performed by: INTERNAL MEDICINE

## 2024-03-14 PROCEDURE — 6360000002 HC RX W HCPCS: Performed by: INTERNAL MEDICINE

## 2024-03-14 PROCEDURE — A4216 STERILE WATER/SALINE, 10 ML: HCPCS | Performed by: INTERNAL MEDICINE

## 2024-03-14 PROCEDURE — 80202 ASSAY OF VANCOMYCIN: CPT

## 2024-03-14 PROCEDURE — 2580000003 HC RX 258: Performed by: INTERNAL MEDICINE

## 2024-03-14 PROCEDURE — 7100000001 HC PACU RECOVERY - ADDTL 15 MIN: Performed by: INTERNAL MEDICINE

## 2024-03-14 PROCEDURE — 6360000002 HC RX W HCPCS: Performed by: NURSE ANESTHETIST, CERTIFIED REGISTERED

## 2024-03-14 PROCEDURE — 6370000000 HC RX 637 (ALT 250 FOR IP): Performed by: NURSE PRACTITIONER

## 2024-03-14 PROCEDURE — 80053 COMPREHEN METABOLIC PANEL: CPT

## 2024-03-14 PROCEDURE — 36415 COLL VENOUS BLD VENIPUNCTURE: CPT

## 2024-03-14 PROCEDURE — 7100000000 HC PACU RECOVERY - FIRST 15 MIN: Performed by: INTERNAL MEDICINE

## 2024-03-14 PROCEDURE — 3609012400 HC EGD TRANSORAL BIOPSY SINGLE/MULTIPLE: Performed by: INTERNAL MEDICINE

## 2024-03-14 PROCEDURE — 6370000000 HC RX 637 (ALT 250 FOR IP): Performed by: INTERNAL MEDICINE

## 2024-03-14 PROCEDURE — 2580000003 HC RX 258: Performed by: NURSE PRACTITIONER

## 2024-03-14 PROCEDURE — 2500000003 HC RX 250 WO HCPCS: Performed by: NURSE ANESTHETIST, CERTIFIED REGISTERED

## 2024-03-14 PROCEDURE — 6360000002 HC RX W HCPCS: Performed by: NURSE PRACTITIONER

## 2024-03-14 PROCEDURE — 88305 TISSUE EXAM BY PATHOLOGIST: CPT

## 2024-03-14 PROCEDURE — C9113 INJ PANTOPRAZOLE SODIUM, VIA: HCPCS | Performed by: INTERNAL MEDICINE

## 2024-03-14 PROCEDURE — 71046 X-RAY EXAM CHEST 2 VIEWS: CPT

## 2024-03-14 PROCEDURE — 3700000001 HC ADD 15 MINUTES (ANESTHESIA): Performed by: INTERNAL MEDICINE

## 2024-03-14 PROCEDURE — 85014 HEMATOCRIT: CPT

## 2024-03-14 PROCEDURE — 1200000000 HC SEMI PRIVATE

## 2024-03-14 PROCEDURE — 85025 COMPLETE CBC W/AUTO DIFF WBC: CPT

## 2024-03-14 PROCEDURE — 2709999900 HC NON-CHARGEABLE SUPPLY: Performed by: INTERNAL MEDICINE

## 2024-03-14 PROCEDURE — 0DB68ZX EXCISION OF STOMACH, VIA NATURAL OR ARTIFICIAL OPENING ENDOSCOPIC, DIAGNOSTIC: ICD-10-PCS | Performed by: INTERNAL MEDICINE

## 2024-03-14 PROCEDURE — 82947 ASSAY GLUCOSE BLOOD QUANT: CPT

## 2024-03-14 PROCEDURE — 0DB98ZX EXCISION OF DUODENUM, VIA NATURAL OR ARTIFICIAL OPENING ENDOSCOPIC, DIAGNOSTIC: ICD-10-PCS | Performed by: INTERNAL MEDICINE

## 2024-03-14 PROCEDURE — 3700000000 HC ANESTHESIA ATTENDED CARE: Performed by: INTERNAL MEDICINE

## 2024-03-14 RX ORDER — SODIUM BICARBONATE 650 MG/1
1300 TABLET ORAL 3 TIMES DAILY
Status: DISCONTINUED | OUTPATIENT
Start: 2024-03-14 | End: 2024-03-16 | Stop reason: HOSPADM

## 2024-03-14 RX ORDER — PROPOFOL 10 MG/ML
INJECTION, EMULSION INTRAVENOUS PRN
Status: DISCONTINUED | OUTPATIENT
Start: 2024-03-14 | End: 2024-03-14 | Stop reason: SDUPTHER

## 2024-03-14 RX ORDER — LIDOCAINE HYDROCHLORIDE 10 MG/ML
INJECTION, SOLUTION EPIDURAL; INFILTRATION; INTRACAUDAL; PERINEURAL PRN
Status: DISCONTINUED | OUTPATIENT
Start: 2024-03-14 | End: 2024-03-14 | Stop reason: SDUPTHER

## 2024-03-14 RX ADMIN — GUAIFENESIN 600 MG: 600 TABLET ORAL at 21:27

## 2024-03-14 RX ADMIN — FLUTICASONE PROPIONATE 2 SPRAY: 50 SPRAY, METERED NASAL at 21:27

## 2024-03-14 RX ADMIN — ACETAMINOPHEN 650 MG: 325 TABLET ORAL at 21:26

## 2024-03-14 RX ADMIN — PROPOFOL 50 MG: 10 INJECTION, EMULSION INTRAVENOUS at 14:11

## 2024-03-14 RX ADMIN — LIDOCAINE HYDROCHLORIDE 50 MG: 10 INJECTION, SOLUTION EPIDURAL; INFILTRATION; INTRACAUDAL; PERINEURAL at 14:06

## 2024-03-14 RX ADMIN — GUAIFENESIN 600 MG: 600 TABLET ORAL at 10:35

## 2024-03-14 RX ADMIN — CEFEPIME 2000 MG: 2 INJECTION, POWDER, FOR SOLUTION INTRAVENOUS at 12:17

## 2024-03-14 RX ADMIN — DULOXETINE HYDROCHLORIDE 20 MG: 20 CAPSULE, DELAYED RELEASE ORAL at 10:35

## 2024-03-14 RX ADMIN — PROPOFOL 50 MG: 10 INJECTION, EMULSION INTRAVENOUS at 14:06

## 2024-03-14 RX ADMIN — HYDROXYZINE HYDROCHLORIDE 50 MG: 25 TABLET, FILM COATED ORAL at 21:27

## 2024-03-14 RX ADMIN — SODIUM BICARBONATE 1300 MG: 650 TABLET ORAL at 21:27

## 2024-03-14 RX ADMIN — PRAVASTATIN SODIUM 20 MG: 40 TABLET ORAL at 10:35

## 2024-03-14 RX ADMIN — PANTOPRAZOLE SODIUM 40 MG: 40 INJECTION, POWDER, FOR SOLUTION INTRAVENOUS at 04:25

## 2024-03-14 RX ADMIN — PROPOFOL 50 MG: 10 INJECTION, EMULSION INTRAVENOUS at 14:09

## 2024-03-14 RX ADMIN — SODIUM CHLORIDE, PRESERVATIVE FREE 10 ML: 5 INJECTION INTRAVENOUS at 10:35

## 2024-03-14 RX ADMIN — DILTIAZEM HYDROCHLORIDE 60 MG: 60 TABLET, FILM COATED ORAL at 21:26

## 2024-03-14 RX ADMIN — INSULIN GLARGINE 12 UNITS: 100 INJECTION, SOLUTION SUBCUTANEOUS at 21:27

## 2024-03-14 RX ADMIN — DILTIAZEM HYDROCHLORIDE 60 MG: 60 TABLET, FILM COATED ORAL at 10:35

## 2024-03-14 RX ADMIN — FLUTICASONE PROPIONATE 2 SPRAY: 50 SPRAY, METERED NASAL at 10:54

## 2024-03-14 RX ADMIN — Medication 1 TABLET: at 10:35

## 2024-03-14 RX ADMIN — SODIUM BICARBONATE 1300 MG: 650 TABLET ORAL at 15:10

## 2024-03-14 RX ADMIN — METOPROLOL TARTRATE 100 MG: 50 TABLET, FILM COATED ORAL at 21:27

## 2024-03-14 RX ADMIN — VANCOMYCIN HYDROCHLORIDE 1000 MG: 1 INJECTION, POWDER, LYOPHILIZED, FOR SOLUTION INTRAVENOUS at 04:25

## 2024-03-14 RX ADMIN — PANTOPRAZOLE SODIUM 40 MG: 40 INJECTION, POWDER, FOR SOLUTION INTRAVENOUS at 15:54

## 2024-03-14 RX ADMIN — METOPROLOL TARTRATE 100 MG: 50 TABLET, FILM COATED ORAL at 10:35

## 2024-03-14 ASSESSMENT — PAIN DESCRIPTION - DESCRIPTORS: DESCRIPTORS: ACHING

## 2024-03-14 ASSESSMENT — PAIN SCALES - GENERAL
PAINLEVEL_OUTOF10: 0
PAINLEVEL_OUTOF10: 7
PAINLEVEL_OUTOF10: 0

## 2024-03-14 ASSESSMENT — ENCOUNTER SYMPTOMS
DIARRHEA: 1
COUGH: 1
SHORTNESS OF BREATH: 0

## 2024-03-14 ASSESSMENT — PAIN DESCRIPTION - LOCATION: LOCATION: ABDOMEN

## 2024-03-14 ASSESSMENT — PAIN DESCRIPTION - ORIENTATION: ORIENTATION: LOWER

## 2024-03-14 NOTE — PROGRESS NOTES
[facility]  Office: [phone]  Aung Luis DO, Mansoor Cline DO, Josse Han DO, Gregory Archibald DO, Andrew Roberts MD, Mayra Medel MD, Ray Johnson MD, Romy Amador MD,  Mic Meraz MD, Rom Hayes MD, Casandra Washington MD,  Ely Burks DO, Dorie Anderson MD, Marcos Travis MD, Trey Luis DO, Delia Clemens MD,  Jorge Major DO, Gisel Herbert MD, Rose Yi MD, Celia Guillen MD, Matthew Hyatt MD,  Sammy Galvez MD, Mary Kay Karimi MD, Edson Hay MD, Mateo Turner MD, Stna Cline MD, Radha Hoskins MD, Dick Lam DO, Peter Isbell DO, Giles Cat MD,  Eran Mckenzie MD, Shirley Waterhouse, CNP,  Missy Chowdhury CNP, Mikael Cai, CNP,  Shantelle Monson, DNP, Betty Boles, CNP, Yany Garcia, CNP, Zulay Ramsay CNP, Cathie Law CNP, Lynda Russell, CNP, An King, PA-C, Zoraida Sherman PA-C, Gabby Vaughn, CNP, Floridalma Cook, CNP, Jaclyn Neff, CNP, Patsy Peterson, CNS, Yareli Montiel, CNP, Martha Prince, CNP, Tracy Schwab, CNP         [facility]   IN-PATIENT SERVICE   [facility]    Progress Note    3/14/2024    1:58 PM    Name:   Prateek Denton II  MRN:     [id number]     Acct:      [id number]   Room:   2037/2037-01  IP Day:  2  Admit Date:  3/11/2024 10:00 PM    PCP:   Lisseth Coello, TORREY - CNP  Code Status:  Full Code    Subjective:     Pt feels worse today   Diarrhea has returned. oral intake is worse. His creatinine isnt changed.   Hemoglobin 8.7->8.6.  No fevers overnight    MRSA nasal swab negative    Scr unchanged 2.7      Brief History:     64-year-old male presents to the hospital for evaluation of shortness of breath, fatigue, nausea, abdominal pain and cough.  Was recently diagnosed with influenza A.  On admission x-ray showed right middle lobe/right lower lobe infiltrate.  There was concern for postviral pneumonia so started on broad-spectrum antibiotics.  He also had watery diarrhea and  >  --  95 182* 173* 177*  --  117* 115*    < > = values in this interval not displayed.       ABG:  Lab Results   Component Value Date/Time    POCPH 7.180 02/29/2024 09:48 AM    PHART 7.363 09/23/2020 03:06 AM    POCPCO2 24.4 02/29/2024 09:48 AM    LDQ9RNB 34.3 09/23/2020 03:06 AM    POCPO2 104.5 02/29/2024 09:48 AM    PO2ART 187.0 09/23/2020 03:06 AM    POCHCO3 9.1 02/29/2024 09:48 AM    JFE0JAD 19.0 09/23/2020 03:06 AM    NBEA 17.4 02/29/2024 09:48 AM    PBEA NOT REPORTED 09/23/2020 03:06 AM    YSM4GRQ 26 09/03/2020 04:26 AM    NIPN6FEF 96.5 02/29/2024 09:48 AM    D5OWRCOY 99.3 09/23/2020 03:06 AM    FIO2 28.0 02/29/2024 09:48 AM     Lab Results   Component Value Date/Time    SPECIAL 10ML RT AC 03/12/2024 12:08 AM     Lab Results   Component Value Date/Time    CULTURE NORMAL RESPIRATORY MIGUEL MODERATE GROWTH 03/12/2024 04:19 AM       Radiology:  CT ABDOMEN PELVIS WO CONTRAST Additional Contrast? None    Result Date: 3/12/2024  1. Mild distension of the large bowel loops with air-fluid levels may reflect ileus. No evidence of bowel obstruction. 2. Consolidation in the right lung middle lobe and lower lobe. 3. Status post left nephrectomy. 4. Degenerative changes in the lumbar spine.     XR CHEST PORTABLE    Result Date: 3/11/2024  Right lower lobe pneumonia. Recommend follow-up to resolution.       Physical Examination:     General appearance:  alert, cooperative and uncomfortable appearing male  Mental Status:  oriented to person, place and time and normal affect  Lungs: Rales noted in the right lower lobe, + crackles, no wheezing, good air movement, chest rise bilaterally  Heart:  regular rate and rhythm, no murmur  Abdomen:  soft, nontender, nondistended, normal bowel sounds, no masses, hepatomegaly, splenomegaly, previous incisions noted  Extremities:  no edema, redness, tenderness in the calves  Skin:  no gross lesions, rashes, induration    Assessment:     Hospital Problems             Last Modified POA    *

## 2024-03-14 NOTE — FLOWSHEET NOTE
03/14/24 1245   Treatment Team Notification   Reason for Communication Review case   Name of Team Member Notified Dr. Padron   Treatment Team Role Consulting Provider   Method of Communication Secure Message   Response See orders     Notified Dr. Padron of negative MRSA swab results and he determined there is no longer a need for vancomycin.

## 2024-03-14 NOTE — PROGRESS NOTES
[facility]  Progress Note    Prateek Denton II  MRN: [id number]  Date: 3/14/2024  LOS: 2     Reason for F/U :   Right lower lobe pneumonia    Impression :   Recent influenza A virus infection  Right lower lobe pneumonia     Acute kidney injury with underlying chronic kidney disease stage IIIb  Diarrhea-dark stools  Diabetes mellitus type 2  Obesity    Recommendations:   The patient continues on intravenous antimicrobial therapy with cefepime and vancomycin  The plan is for the patient to undergo endoscopic evaluation by the gastroenterology service today  The patient's initial diagnosis of influenza was about 2 weeks ago and the patient no longer requires isolation for this  I will continue to follow his progress and culture data and adjust therapy accordingly    Infection Control Recommendations:   Universal precautions    Discharge Planning:   Estimated Length of IV antimicrobials: While hospitalized  Patient will need Midline Catheter Insertion/ PICC line Insertion: No  Patient will need: Home IV , Infusion Center,  SNF,  LTAC: Undetermined  Patient willneed outpatient wound care: No    Medical Decision making / Summary of Stay:   Prateek Denton II is a 64 y.o.-year-old male who was initially admitted on 3/11/2024.   Prateek has diabetes mellitus type 2, hypertension, paroxysmal atrial fibrillation, obstructive sleep apnea, obesity and was recently hospitalized with influenza A virus infection and had A-fib with RVR and acute kidney injury at that time.  He was hospitalized from 2/29/2024 through 3/5/2024 and was treated with antiviral therapy with Tamiflu during that hospital stay.     The patient reports that since his discharge he has continued to have shortness of breath, no stamina, generalized weakness, difficulty in breathing especially with minimal exertion.  The patient has been coughing with some mild while phlegm reduction and has not had any associated fevers or chills.  He does

## 2024-03-14 NOTE — PROGRESS NOTES
Occupational Therapy  DATE: 3/14/2024    NAME: Prateek Denton II  MRN: [id number]   : 1960    Patient not seen this date for Occupational Therapy due to:      [] Cancel by RN or physician due to:    [] Hemodialysis    [] Critical Lab Value Level     [] Blood transfusion in progress    [] Acute or unstable cardiovascular status   _MAP < 55 or more than >115  _HR < 40 or > 130    [] Acute or unstable pulmonary status   -FiO2 > 60%   _RR < 5 or >40    _O2 sats < 85%    [] Strict Bedrest    [] Off Unit for surgery or procedure    [] Off Unit for testing       [] Pending imaging to R/O fracture    [x] Refusal by Patient : Pt. Declined treatment d/t just returning from x-ray and waiting for EGD. Nursing aware. OT will cont to follow.     [] Other      [] OT being discontinued at this time. Patient independent. No further needs.     [] OT being discontinued at this time as the patient has been transferred to hospice care. No further needs.      Farzaneh Sandra RANI

## 2024-03-14 NOTE — OP NOTE
EGD NOTE      Patient:   Prateek Denton II    :    1960    Facility:   [facility]  Referring/PCP: Lisseth Coello, TORREY - CNP    Procedure:   Esophagogastroduodenoscopy with biopsy  Date:     3/14/2024   Endoscopist:  Juan Francis D.O.  Assistant:                  None    Preoperative Diagnosis:     Melena  Anemia  Chronic diarrhea    Postoperative Diagnosis:    Antral polyp  Duodenal ulcer  Duodenal atrophy    Anesthesia:  MAC    Complications: None    Description of Procedure:  Informed consent was obtained after explanation of the procedure including indications, description of the procedure,  benefits and possible risks and complications of the procedure, and alternatives. Questions were answered.  The patient's history was reviewed and a directed physical examination was performed prior to the procedure.    Patient was monitored throughout the procedure with pulse oximetry and periodic assessment of vital signs. Patient was sedated as noted above. With the patient in the left lateral decubitus position, the Olympus videoendoscope was placed in the patient's mouth and under direct visualization passed into the esophagus.  Visualization of the esophagus, stomach, and duodenum was performed during both introduction and withdrawal of the endoscope and retroflexed view of the proximal stomach was obtained. The scope was passed to the 2nd portion of the duodenum.  The patient tolerated the procedure well and was taken to the recovery area in good condition.    EBL: none  Specimen:  duodenal and gastric  Implants: None      Findings::   Esophagus: normal.   Stomach: Medium sized sessile antral polyp, biopsied.  Gastric biopsies also obtained.  Duodenum: 7 mm cratered ulcer in proximal aspect of 2nd part.  Significantly atrophic and granular appearing mucosa in 1st and 2nd part.  Biopsied to check for Celiac Dz.    Recommendations:   -Await pathology.  -PPI BID for next 4 weeks, then once  daily.  -Resume diet and meds.  -Office f/u to manage his chronic diarrhea.  Will likely need a colonoscopy with aggressive prep.      Electronically signed by Juan Francis MD, D.O. on 3/14/2024 at 1:48 PM

## 2024-03-14 NOTE — FLOWSHEET NOTE
03/14/24 1452   Treatment Team Notification   Reason for Communication Review case   Name of Team Member Notified Dr. Duffy   Treatment Team Role Consulting Provider   Method of Communication Secure Message   Response No new orders     Notified Dr. Duffy of CXR results and updated orders to discontinue fluids by primary team.

## 2024-03-14 NOTE — ANESTHESIA PRE PROCEDURE
Department of Anesthesiology  Preprocedure Note       Name:  Prateek Denton II   Age:  64 y.o.  :  1960                                          MRN:  [id number]         Date:  3/14/2024      Surgeon: Surgeon(s):  Juan Francis MD    Procedure: Procedure(s):  ESOPHAGOGASTRODUODENOSCOPY    Medications prior to admission:   Prior to Admission medications    Medication Sig Start Date End Date Taking? Authorizing Provider   B Complex-C-Folic Acid (NICK-WATSON) TABS Take 1 tablet by mouth daily 3/6/24   Ray Johnson MD   sodium bicarbonate 650 MG tablet Take 1 tablet by mouth 2 times daily 3/5/24   Ray Johnson MD   triamcinolone (KENALOG) 0.1 % cream Apply twice daily for 2 weeks 3/5/24   Ray Johnson MD   ferrous sulfate (FE TABS 325) 325 (65 Fe) MG EC tablet Take 1 tablet by mouth daily    ProviderSouleymane MD   albuterol sulfate HFA (VENTOLIN HFA) 108 (90 Base) MCG/ACT inhaler Inhale 2 puffs into the lungs 4 times daily as needed for Wheezing or Shortness of Breath 24   Samuel Vega DO   fluticasone (FLONASE) 50 MCG/ACT nasal spray 2 sprays by Nasal route in the morning and at bedtime 24   ProviderSouleymane MD   Azelastine HCl 137 MCG/SPRAY SOLN 2 sprays by Nasal route in the morning and at bedtime 24   Souleymane Limon MD   Continuous Blood Gluc Sensor (FREESTYLE RANJIT 3 SENSOR) MISC Change sensor every 14 days for continuous glucose monitoring. 23   Lisseth Coello, APRN - CNP   DULoxetine (CYMBALTA) 20 MG extended release capsule TAKE 1 CAPSULE BY MOUTH EVERY DAY 23   Lisseth Coello, TORREY - CNP   hydrOXYzine HCl (ATARAX) 50 MG tablet TAKE 1 TABLET BY MOUTH EVERY DAY AT NIGHT 23   Lisseth Coello, APRN - BRIT   dilTIAZem (CARDIZEM) 60 MG tablet TAKE 1 TABLET BY MOUTH IN THE MORNING AND 1 TABLET IN THE EVENING. 23   Lisseth Coello APRN - CNP   pravastatin (PRAVACHOL) 20 MG tablet TAKE 1 TABLET BY MOUTH EVERY DAY 12/3/23   [place]

## 2024-03-14 NOTE — PROGRESS NOTES
Reason for Consult:  Acute kidney injury.    Interval Hx:  Follow up for DEBBI on CKD 3B.  Still has diarrhea.  Cr is stable at 2.7  Na 134-135, K 4.2 to 4.6, bicarbonate 19.  UOP 1825 ml.  C/O mild dyspnea at rest.  Hgb 8.6-8.7.    HISTORY OF PRESENT ILLNESS:    The patient is a 64 y.o. male who has history of CKD stage 3B presents with nausea, vomiting, diarrhea, poor appetite. On previous labs his serum creatinines have been between 1.8 to 1.9 with eGFR of 30-40s ml/min. On admission his creatinine was elevated at 3.3 that has improved to 2.8 today with iv hydration. His potassium was 4.3 with a CO2 level of 18. Denies any difficulty with urination. Denies any recent use of NSAIDs or iv contrast.     Review Of Systems:   Constitutional: No fever, chills, lethargy, weakness or wt loss.  Cardiac:  No chest pain, dyspnea, orthopnea or PND.  Chest:              No cough, phlegm or wheezing.  Abdomen:  As above.  Neuro:  No gross focal weakness, numbness, abnormal movements or seizure like activity.  :   No hematuria, pyuria, dysuria or flank pain.  Extremities:  No swelling or joint pains.  Hematology:    No bleeding disorders, bruising or anemia.      Past Medical History:   Diagnosis Date    Acute blood loss anemia 01/05/2022    Arthritis     BILATERAL KNEESand right shoulder    Back pain     Cellulitis 07/14/2015    Colostomy RUQ 09/23/2020    Frequent headaches     10/28/2020 PATIENT STATES EVERY OTHER DAY.    Gastrointestinal hemorrhage with melena 01/05/2022    Gout     Hemorrhagic shock (HCC) 06/21/2023    Hernia of abdominal wall     History of atrial fibrillation     AFTER SURGERY ON 09/23/2020 WENT INTO A FIB. CONVERTED BACK TO NORMAL RHYTHM ON HIS OWN. CARDIOLOGIST DR STEIN    History of blood transfusion     NO REACTION    [place] (hard of hearing)     HTN     K. Slaughter, CNP    Hyperkalemia     Hyperlipidemia     Incisional hernia     Kidney infection     Large bowel obstruction (HCC) 08/31/2020    Morbid

## 2024-03-14 NOTE — ANESTHESIA POSTPROCEDURE EVALUATION
Department of Anesthesiology  Postprocedure Note    Patient: Prateek Denton II  MRN: [id number]  YOB: 1960  Date of evaluation: 3/14/2024    Procedure Summary       Date: 03/14/24 Room / Location: [address]    Anesthesia Start: 1401 Anesthesia Stop: 1428    Procedure: ESOPHAGOGASTRODUODENOSCOPY BIOPSY (Esophagus) Diagnosis:       Gastrointestinal hemorrhage, unspecified gastrointestinal hemorrhage type      (Gastrointestinal hemorrhage, unspecified gastrointestinal hemorrhage type [K92.2])    Surgeons: Juan Francis MD Responsible Provider: Saida Jeffrey MD    Anesthesia Type: general ASA Status: 3            Anesthesia Type: No value filed.    Destiny Phase I: Destiny Score: 9    Destiny Phase II:      Anesthesia Post Evaluation    Cardiovascular status: hemodynamically stable    No notable events documented.

## 2024-03-14 NOTE — PROGRESS NOTES
Physical Therapy  DATE: 3/14/2024    NAME: Prateek Denton II  MRN: [id number]   : 1960    Patient not seen this date for Physical Therapy due to:      [] Cancel by RN or physician due to:    [] Hemodialysis    [] Critical Lab Value Level     [] Blood transfusion in progress    [] Acute or unstable cardiovascular status   _MAP < 55 or more than >115  _HR < 40 or > 130    [] Acute or unstable pulmonary status   -FiO2 > 60%   _RR < 5 or >40    _O2 sats < 85%    [] Strict Bedrest    [x] Off Unit for surgery or procedure pt out of room     [] Off Unit for testing       [] Pending imaging to R/O fracture    [] Refusal by Patient      [] Other      [] PT being discontinued at this time. Patient independent. No further needs.     [] PT being discontinued at this time as the patient has been transferred to hospice care. No further needs.      SHAR AMES, PTA

## 2024-03-15 LAB
ANION GAP SERPL CALCULATED.3IONS-SCNC: 14 MMOL/L (ref 9–17)
BASOPHILS # BLD: 0.05 K/UL (ref 0–0.2)
BASOPHILS # BLD: 0.05 K/UL (ref 0–0.2)
BASOPHILS NFR BLD: 0 % (ref 0–2)
BASOPHILS NFR BLD: 1 % (ref 0–2)
BUN SERPL-MCNC: 41 MG/DL (ref 8–23)
BUN/CREAT SERPL: 16 (ref 9–20)
CALCIUM SERPL-MCNC: 9 MG/DL (ref 8.6–10.4)
CALPROTECTIN, FECAL: 41 UG/G
CHLORIDE SERPL-SCNC: 103 MMOL/L (ref 98–107)
CO2 SERPL-SCNC: 19 MMOL/L (ref 20–31)
CREAT SERPL-MCNC: 2.5 MG/DL (ref 0.7–1.2)
EOSINOPHIL # BLD: 0.16 K/UL (ref 0–0.44)
EOSINOPHIL # BLD: 0.21 K/UL (ref 0–0.44)
EOSINOPHILS RELATIVE PERCENT: 2 % (ref 1–4)
EOSINOPHILS RELATIVE PERCENT: 2 % (ref 1–4)
ERYTHROCYTE [DISTWIDTH] IN BLOOD BY AUTOMATED COUNT: 12.9 % (ref 11.8–14.4)
ERYTHROCYTE [DISTWIDTH] IN BLOOD BY AUTOMATED COUNT: 13.1 % (ref 11.8–14.4)
GFR SERPL CREATININE-BSD FRML MDRD: 28 ML/MIN/1.73M2
GLUCOSE BLD-MCNC: 101 MG/DL (ref 75–110)
GLUCOSE BLD-MCNC: 141 MG/DL (ref 75–110)
GLUCOSE BLD-MCNC: 168 MG/DL (ref 75–110)
GLUCOSE BLD-MCNC: 210 MG/DL (ref 75–110)
GLUCOSE SERPL-MCNC: 137 MG/DL (ref 70–99)
HCT VFR BLD AUTO: 26.5 % (ref 40.7–50.3)
HCT VFR BLD AUTO: 26.6 % (ref 40.7–50.3)
HCT VFR BLD AUTO: 30.1 % (ref 40.7–50.3)
HGB BLD-MCNC: 8.5 G/DL (ref 13–17)
HGB BLD-MCNC: 8.6 G/DL (ref 13–17)
HGB BLD-MCNC: 9.5 G/DL (ref 13–17)
IMM GRANULOCYTES # BLD AUTO: 0.28 K/UL (ref 0–0.3)
IMM GRANULOCYTES # BLD AUTO: 0.32 K/UL (ref 0–0.3)
IMM GRANULOCYTES NFR BLD: 2 %
IMM GRANULOCYTES NFR BLD: 3 %
LYMPHOCYTES NFR BLD: 0.83 K/UL (ref 1.1–3.7)
LYMPHOCYTES NFR BLD: 0.93 K/UL (ref 1.1–3.7)
LYMPHOCYTES RELATIVE PERCENT: 7 % (ref 24–43)
LYMPHOCYTES RELATIVE PERCENT: 9 % (ref 24–43)
MCH RBC QN AUTO: 29 PG (ref 25.2–33.5)
MCH RBC QN AUTO: 29.1 PG (ref 25.2–33.5)
MCHC RBC AUTO-ENTMCNC: 31.6 G/DL (ref 28.4–34.8)
MCHC RBC AUTO-ENTMCNC: 32 G/DL (ref 28.4–34.8)
MCV RBC AUTO: 91.1 FL (ref 82.6–102.9)
MCV RBC AUTO: 91.8 FL (ref 82.6–102.9)
MONOCYTES NFR BLD: 0.93 K/UL (ref 0.1–1.2)
MONOCYTES NFR BLD: 1.02 K/UL (ref 0.1–1.2)
MONOCYTES NFR BLD: 8 % (ref 3–12)
MONOCYTES NFR BLD: 9 % (ref 3–12)
NEUTROPHILS NFR BLD: 76 % (ref 36–65)
NEUTROPHILS NFR BLD: 81 % (ref 36–65)
NEUTS SEG NFR BLD: 8.34 K/UL (ref 1.5–8.1)
NEUTS SEG NFR BLD: 9.73 K/UL (ref 1.5–8.1)
NRBC BLD-RTO: 0 PER 100 WBC
NRBC BLD-RTO: 0 PER 100 WBC
PLATELET # BLD AUTO: 298 K/UL (ref 138–453)
PLATELET # BLD AUTO: 338 K/UL (ref 138–453)
PMV BLD AUTO: 10 FL (ref 8.1–13.5)
PMV BLD AUTO: 10 FL (ref 8.1–13.5)
POTASSIUM SERPL-SCNC: 3.8 MMOL/L (ref 3.7–5.3)
RBC # BLD AUTO: 2.92 M/UL (ref 4.21–5.77)
RBC # BLD AUTO: 3.28 M/UL (ref 4.21–5.77)
SODIUM SERPL-SCNC: 136 MMOL/L (ref 135–144)
WBC OTHER # BLD: 10.8 K/UL (ref 3.5–11.3)
WBC OTHER # BLD: 12 K/UL (ref 3.5–11.3)

## 2024-03-15 PROCEDURE — 97110 THERAPEUTIC EXERCISES: CPT

## 2024-03-15 PROCEDURE — 6370000000 HC RX 637 (ALT 250 FOR IP): Performed by: INTERNAL MEDICINE

## 2024-03-15 PROCEDURE — 6360000002 HC RX W HCPCS: Performed by: INTERNAL MEDICINE

## 2024-03-15 PROCEDURE — 2580000003 HC RX 258: Performed by: INTERNAL MEDICINE

## 2024-03-15 PROCEDURE — 99233 SBSQ HOSP IP/OBS HIGH 50: CPT | Performed by: INTERNAL MEDICINE

## 2024-03-15 PROCEDURE — 85025 COMPLETE CBC W/AUTO DIFF WBC: CPT

## 2024-03-15 PROCEDURE — 1200000000 HC SEMI PRIVATE

## 2024-03-15 PROCEDURE — 82947 ASSAY GLUCOSE BLOOD QUANT: CPT

## 2024-03-15 PROCEDURE — 6370000000 HC RX 637 (ALT 250 FOR IP): Performed by: NURSE PRACTITIONER

## 2024-03-15 PROCEDURE — 99232 SBSQ HOSP IP/OBS MODERATE 35: CPT | Performed by: INTERNAL MEDICINE

## 2024-03-15 PROCEDURE — 80048 BASIC METABOLIC PNL TOTAL CA: CPT

## 2024-03-15 PROCEDURE — 97116 GAIT TRAINING THERAPY: CPT

## 2024-03-15 PROCEDURE — C9113 INJ PANTOPRAZOLE SODIUM, VIA: HCPCS | Performed by: INTERNAL MEDICINE

## 2024-03-15 PROCEDURE — 85018 HEMOGLOBIN: CPT

## 2024-03-15 PROCEDURE — A4216 STERILE WATER/SALINE, 10 ML: HCPCS | Performed by: INTERNAL MEDICINE

## 2024-03-15 PROCEDURE — 85014 HEMATOCRIT: CPT

## 2024-03-15 PROCEDURE — 36415 COLL VENOUS BLD VENIPUNCTURE: CPT

## 2024-03-15 RX ORDER — OXYCODONE HYDROCHLORIDE AND ACETAMINOPHEN 5; 325 MG/1; MG/1
1 TABLET ORAL ONCE
Status: COMPLETED | OUTPATIENT
Start: 2024-03-15 | End: 2024-03-15

## 2024-03-15 RX ORDER — PANTOPRAZOLE SODIUM 40 MG/1
40 TABLET, DELAYED RELEASE ORAL
Status: DISCONTINUED | OUTPATIENT
Start: 2024-03-15 | End: 2024-03-16 | Stop reason: HOSPADM

## 2024-03-15 RX ORDER — LOPERAMIDE HYDROCHLORIDE 2 MG/1
2 CAPSULE ORAL 3 TIMES DAILY PRN
Status: DISCONTINUED | OUTPATIENT
Start: 2024-03-15 | End: 2024-03-16 | Stop reason: HOSPADM

## 2024-03-15 RX ADMIN — INSULIN GLARGINE 12 UNITS: 100 INJECTION, SOLUTION SUBCUTANEOUS at 20:42

## 2024-03-15 RX ADMIN — CEFEPIME 2000 MG: 2 INJECTION, POWDER, FOR SOLUTION INTRAVENOUS at 14:11

## 2024-03-15 RX ADMIN — PRAVASTATIN SODIUM 20 MG: 40 TABLET ORAL at 09:12

## 2024-03-15 RX ADMIN — OXYCODONE AND ACETAMINOPHEN 1 TABLET: 5; 325 TABLET ORAL at 23:00

## 2024-03-15 RX ADMIN — SODIUM BICARBONATE 1300 MG: 650 TABLET ORAL at 09:12

## 2024-03-15 RX ADMIN — PANTOPRAZOLE SODIUM 40 MG: 40 TABLET, DELAYED RELEASE ORAL at 18:02

## 2024-03-15 RX ADMIN — DULOXETINE HYDROCHLORIDE 20 MG: 20 CAPSULE, DELAYED RELEASE ORAL at 09:12

## 2024-03-15 RX ADMIN — PANTOPRAZOLE SODIUM 40 MG: 40 INJECTION, POWDER, FOR SOLUTION INTRAVENOUS at 02:05

## 2024-03-15 RX ADMIN — METOPROLOL TARTRATE 100 MG: 50 TABLET, FILM COATED ORAL at 09:12

## 2024-03-15 RX ADMIN — GUAIFENESIN 600 MG: 600 TABLET ORAL at 09:12

## 2024-03-15 RX ADMIN — SODIUM CHLORIDE, PRESERVATIVE FREE 10 ML: 5 INJECTION INTRAVENOUS at 09:15

## 2024-03-15 RX ADMIN — CEFEPIME 2000 MG: 2 INJECTION, POWDER, FOR SOLUTION INTRAVENOUS at 02:23

## 2024-03-15 RX ADMIN — DILTIAZEM HYDROCHLORIDE 60 MG: 60 TABLET, FILM COATED ORAL at 20:43

## 2024-03-15 RX ADMIN — Medication 1 TABLET: at 09:12

## 2024-03-15 RX ADMIN — HYDROXYZINE HYDROCHLORIDE 50 MG: 25 TABLET, FILM COATED ORAL at 20:43

## 2024-03-15 RX ADMIN — SODIUM BICARBONATE 1300 MG: 650 TABLET ORAL at 14:11

## 2024-03-15 RX ADMIN — GUAIFENESIN 600 MG: 600 TABLET ORAL at 20:43

## 2024-03-15 RX ADMIN — FLUTICASONE PROPIONATE 2 SPRAY: 50 SPRAY, METERED NASAL at 09:15

## 2024-03-15 RX ADMIN — SODIUM BICARBONATE 1300 MG: 650 TABLET ORAL at 20:43

## 2024-03-15 RX ADMIN — OXYCODONE AND ACETAMINOPHEN 1 TABLET: 5; 325 TABLET ORAL at 01:12

## 2024-03-15 RX ADMIN — FLUTICASONE PROPIONATE 2 SPRAY: 50 SPRAY, METERED NASAL at 20:43

## 2024-03-15 RX ADMIN — DILTIAZEM HYDROCHLORIDE 60 MG: 60 TABLET, FILM COATED ORAL at 09:12

## 2024-03-15 RX ADMIN — SODIUM CHLORIDE, PRESERVATIVE FREE 10 ML: 5 INJECTION INTRAVENOUS at 21:00

## 2024-03-15 RX ADMIN — METOPROLOL TARTRATE 100 MG: 50 TABLET, FILM COATED ORAL at 20:43

## 2024-03-15 ASSESSMENT — PAIN SCALES - GENERAL
PAINLEVEL_OUTOF10: 7
PAINLEVEL_OUTOF10: 0

## 2024-03-15 ASSESSMENT — ENCOUNTER SYMPTOMS
COUGH: 1
DIARRHEA: 1

## 2024-03-15 ASSESSMENT — PAIN DESCRIPTION - ORIENTATION
ORIENTATION: LOWER

## 2024-03-15 ASSESSMENT — PAIN DESCRIPTION - ONSET: ONSET: ON-GOING

## 2024-03-15 ASSESSMENT — PAIN DESCRIPTION - DESCRIPTORS
DESCRIPTORS: ACHING;DULL
DESCRIPTORS: ACHING;SORE
DESCRIPTORS: ACHING;DULL

## 2024-03-15 ASSESSMENT — PAIN DESCRIPTION - LOCATION
LOCATION: ABDOMEN

## 2024-03-15 ASSESSMENT — PAIN - FUNCTIONAL ASSESSMENT: PAIN_FUNCTIONAL_ASSESSMENT: PREVENTS OR INTERFERES SOME ACTIVE ACTIVITIES AND ADLS

## 2024-03-15 ASSESSMENT — PAIN DESCRIPTION - FREQUENCY: FREQUENCY: CONTINUOUS

## 2024-03-15 ASSESSMENT — PAIN DESCRIPTION - PAIN TYPE: TYPE: ACUTE PAIN

## 2024-03-15 NOTE — PROGRESS NOTES
Function;Precautions;Mobility Training  Education Method: Verbal  Barriers to Learning: None  Education Outcome: Verbalized understanding    AM-PAC Score    AM-PAC Inpatient Mobility Raw Score : 20  AM-PAC Inpatient T-Scale Score : 47.67  Mobility Inpatient CMS 0-100% Score: 35.83  Mobility Inpatient CMS G-Code Modifier:CJ        Therapy Time   Individual Concurrent Group Co-treatment   Time In 1303         Time Out 1326         Minutes 23                   SHAR AMES PTA, 03/15/24 at 2:28 PM

## 2024-03-15 NOTE — PROGRESS NOTES
End Of Shift Note  [facility]  Summary of shift: Patient resting in bed. His plan is to continue to ween off oxygen and go home tomorrow with oral antibiotics per ID. Patient is currently down to 1 liter NC.    Vitals:    Vitals:    03/15/24 0400 03/15/24 0800 03/15/24 1200 03/15/24 1600   BP: 135/64 (!) 152/79 129/71 (!) 147/75   Pulse: 67 93 73 85   Resp: 18 18     Temp: 97.2 °F (36.2 °C) 98 °F (36.7 °C) 98.1 °F (36.7 °C) 98.2 °F (36.8 °C)   TempSrc: Temporal Temporal Temporal Temporal   SpO2: 99% 98% 96% 94%   Weight: 116.6 kg (257 lb)      Height:            I&O:   Intake/Output Summary (Last 24 hours) at 3/15/2024 1837  Last data filed at 3/15/2024 0238  Gross per 24 hour   Intake 334.26 ml   Output 1825 ml   Net -1490.74 ml       Resp Status: 1 liter NC     Ventilator Settings:     / / /     Critical Care IV infusions:   dextrose      sodium chloride          LDA:   Peripheral IV 03/13/24 Left Cephalic (Active)   Number of days: 2

## 2024-03-15 NOTE — PROGRESS NOTES
Occupational Therapy  Facility/Department: [facility]  Rehabilitation Occupational Therapy Daily Treatment Note    Date: 3/15/24  Patient Name: Prateek Denton II       Room:   MRN: [id number]  Account: [id number]   : 1960  (64 y.o.) Gender: male     Past Medical History:  has a past medical history of Acute blood loss anemia, Arthritis, Back pain, Cellulitis, Colostomy RUQ, Frequent headaches, Gastrointestinal hemorrhage with melena, Gout, Hemorrhagic shock (HCC), Hernia of abdominal wall, History of atrial fibrillation, History of blood transfusion, [place] (hard of hearing), HTN, Hyperkalemia, Hyperlipidemia, Incisional hernia, Kidney infection, Large bowel obstruction (HCC), Morbid obesity due to excess calories (HCC), Paroxysmal A-fib (HCC), SBO (small bowel obstruction) (HCC), Sepsis (HCC), Single kidney, Sleep apnea, T2DM, Tooth missing, Under care of team, Upper GI bleed, Wears glasses, and Wellness examination.  Past Surgical History:   has a past surgical history that includes Ankle surgery (Right, ); Carpal tunnel release (Bilateral); Cystoscopy (Right, 2020); hc cath power picc triple (2020); Kidney surgery (Left, 2020); Small intestine surgery (N/A, 2020); laparotomy (N/A, 2020); Tonsillectomy; Knee arthroscopy (Bilateral); Adenoidectomy; cysto/uretero/pyeloscopy, calculus tx (Right, 10/30/2020); colostomy; Small intestine surgery (N/A, 2020); Tympanostomy tube placement; Upper gastrointestinal endoscopy (N/A, 2022); Colonoscopy (2023); Colonoscopy (N/A, 2023); Upper gastrointestinal endoscopy (N/A, 2023); Upper gastrointestinal endoscopy (N/A, 2023); Upper gastrointestinal endoscopy (N/A, 2023); Upper gastrointestinal endoscopy (N/A, 2023); and Upper gastrointestinal endoscopy (N/A, 3/14/2024).    Restrictions  Restrictions/Precautions: General Precautions, Fall Risk, Contact Precautions, Isolation  Other  Inpatient ADL T-Scale Score : 40.22 (03/15/24 1458)  ADL Inpatient CMS 0-100% Score: 42.8 (03/15/24 1458)  ADL Inpatient CMS G-Code Modifier : CK (03/15/24 1458)      Therapy Time   Individual Concurrent Group Co-treatment   Time In 1415         Time Out 1439         Minutes 24          RN reports patient is medically stable for therapy treatment this date.    Chart reviewed prior to treatment and patient is agreeable for therapy.  All lines intact and patient positioned comfortably at end of treatment.  All patient needs addressed prior to ending therapy session.          Farzaneh Sandra RANI

## 2024-03-15 NOTE — PLAN OF CARE
Problem: Chronic Conditions and Co-morbidities  Goal: Patient's chronic conditions and co-morbidity symptoms are monitored and maintained or improved  Outcome: Progressing  Flowsheets (Taken 3/14/2024 2000)  Care Plan - Patient's Chronic Conditions and Co-Morbidity Symptoms are Monitored and Maintained or Improved:   Monitor and assess patient's chronic conditions and comorbid symptoms for stability, deterioration, or improvement   Collaborate with multidisciplinary team to address chronic and comorbid conditions and prevent exacerbation or deterioration     Problem: Discharge Planning  Goal: Discharge to home or other facility with appropriate resources  Outcome: Progressing  Flowsheets (Taken 3/14/2024 2000)  Discharge to home or other facility with appropriate resources:   Identify barriers to discharge with patient and caregiver   Arrange for needed discharge resources and transportation as appropriate   Identify discharge learning needs (meds, wound care, etc)     Problem: Pain  Goal: Verbalizes/displays adequate comfort level or baseline comfort level  Outcome: Progressing  Flowsheets (Taken 3/14/2024 2000)  Verbalizes/displays adequate comfort level or baseline comfort level:   Encourage patient to monitor pain and request assistance   Assess pain using appropriate pain scale   Administer analgesics based on type and severity of pain and evaluate response

## 2024-03-15 NOTE — PROGRESS NOTES
Reason for Consult:  Acute kidney injury.    Interval Hx:  Follow up for DEBBI on CKD 3B.  Still has diarrhea.  Cr is better at 2.5.  Na 134-136, K 3.8, bicarbonate stable at 19.  UOP 2975 ml.  C/O mild dyspnea at rest.  Hgb 9.5.    HISTORY OF PRESENT ILLNESS:    The patient is a 64 y.o. male who has history of CKD stage 3B presents with nausea, vomiting, diarrhea, poor appetite. On previous labs his serum creatinines have been between 1.8 to 1.9 with eGFR of 30-40s ml/min. On admission his creatinine was elevated at 3.3 that has improved to 2.8 today with iv hydration. His potassium was 4.3 with a CO2 level of 18. Denies any difficulty with urination. Denies any recent use of NSAIDs or iv contrast.     Review Of Systems:   Constitutional: No fever, chills, lethargy, weakness or wt loss.  Cardiac:  No chest pain, dyspnea, orthopnea or PND.  Chest:              No cough, phlegm or wheezing.  Abdomen:  As above.  Neuro:  No gross focal weakness, numbness, abnormal movements or seizure like activity.  :   No hematuria, pyuria, dysuria or flank pain.  Extremities:  No swelling or joint pains.  Hematology:    No bleeding disorders, bruising or anemia.      Past Medical History:   Diagnosis Date    Acute blood loss anemia 01/05/2022    Arthritis     BILATERAL KNEESand right shoulder    Back pain     Cellulitis 07/14/2015    Colostomy RUQ 09/23/2020    Frequent headaches     10/28/2020 PATIENT STATES EVERY OTHER DAY.    Gastrointestinal hemorrhage with melena 01/05/2022    Gout     Hemorrhagic shock (HCC) 06/21/2023    Hernia of abdominal wall     History of atrial fibrillation     AFTER SURGERY ON 09/23/2020 WENT INTO A FIB. CONVERTED BACK TO NORMAL RHYTHM ON HIS OWN. CARDIOLOGIST DR STEIN    History of blood transfusion     NO REACTION    [place] (hard of hearing)     HTN     K. Slaughter, CNP    Hyperkalemia     Hyperlipidemia     Incisional hernia     Kidney infection     Large bowel obstruction (HCC) 08/31/2020    Morbid

## 2024-03-15 NOTE — PROGRESS NOTES
[facility]  Progress Note    Prateek Denton II  MRN: [id number]  Date: 3/15/2024  LOS: 3     Reason for F/U :   Right lower lobe pneumonia    Impression :   Recent influenza A virus infection  Right lower lobe pneumonia     Acute kidney injury with underlying chronic kidney disease stage IIIb  Diarrhea-dark stools  Diabetes mellitus type 2  Obesity    Recommendations:   The patient continues on intravenous antimicrobial therapy with cefepime   Vancomycin was discontinued 3/14/2024 after MRSA PCR testing came back  The patient's initial diagnosis of influenza was about 2 weeks ago and the patient no longer requires isolation for this  Infectious disease wise the plan will be for discharge on oral antimicrobial therapy with levofloxacin at 750 mg every 48 hours to complete a 14-day course of therapy  The GI team plans to do a colonoscopy as an outpatient to manage the chronic diarrhea    Infection Control Recommendations:   Universal precautions    Discharge Planning:   Estimated Length of IV antimicrobials: While hospitalized  Patient will need Midline Catheter Insertion/ PICC line Insertion: No  Patient will need: Home IV , Infusion Center,  SNF,  LTAC: Undetermined  Patient willneed outpatient wound care: No    Medical Decision making / Summary of Stay:   Prateek Denton II is a 64 y.o.-year-old male who was initially admitted on 3/11/2024.   Prateek has diabetes mellitus type 2, hypertension, paroxysmal atrial fibrillation, obstructive sleep apnea, obesity and was recently hospitalized with influenza A virus infection and had A-fib with RVR and acute kidney injury at that time.  He was hospitalized from 2/29/2024 through 3/5/2024 and was treated with antiviral therapy with Tamiflu during that hospital stay.     The patient reports that since his discharge he has continued to have shortness of breath, no stamina, generalized weakness, difficulty in breathing especially with minimal exertion.

## 2024-03-15 NOTE — PROGRESS NOTES
End Of Shift Note  [facility]  Summary of shift: Pt had pain in lower abd. Pt was give one dose of percocet when tylenol wasn't working for the pain. Percocet did help with his pain. Pt did ambulate to the bathroom with a little SOB, but O2 sats stayed in the 90's.     Vitals:    Vitals:    03/15/24 0000 03/15/24 0112 03/15/24 0142 03/15/24 0400   BP: 133/71   135/64   Pulse: 73   67   Resp: 16 18 18 18   Temp: 97.8 °F (36.6 °C)   97.2 °F (36.2 °C)   TempSrc: Temporal   Temporal   SpO2: 99%   99%   Weight:    116.6 kg (257 lb)   Height:            I&O:   Intake/Output Summary (Last 24 hours) at 3/15/2024 0619  Last data filed at 3/15/2024 0238  Gross per 24 hour   Intake 334.26 ml   Output 2975 ml   Net -2640.74 ml       Resp Status: 2L NC    Ventilator Settings:     / / /     Critical Care IV infusions:   dextrose      sodium chloride          LDA:   Peripheral IV 03/13/24 Left Cephalic (Active)   Number of days: 1

## 2024-03-15 NOTE — PROGRESS NOTES
and peptic ulcers so GI was consulted and performed an EGD which showed: His DEBBI was thought to be due to sepsis/volume depletion improved with IV fluids    Medications:     Allergies:    Allergies   Allergen Reactions    Ampicillin Swelling     Swelling of throat.     Pcn [Penicillins] Swelling     Throat swelling.  Tolerated cefepime during 20 admission.    Sulfa Antibiotics      Other reaction(s): Unknown    Tape [Adhesive Tape] Other (See Comments)     CAUSES REDNESS. PAPER TAPE OK.       Current Meds:   Scheduled Meds:    sodium bicarbonate  1,300 mg Oral TID    pantoprazole (PROTONIX) 40 mg in sodium chloride (PF) 0.9 % 10 mL injection  40 mg IntraVENous Q12H    cefepime  2,000 mg IntraVENous Q12H    deon-donn  1 tablet Oral Daily    DULoxetine  20 mg Oral Daily    [Held by provider] ferrous sulfate  325 mg Oral Daily    fluticasone  2 spray Nasal BID    hydrOXYzine HCl  50 mg Oral Nightly    insulin glargine  12 Units SubCUTAneous Nightly    pravastatin  20 mg Oral Daily    sodium chloride flush  5-40 mL IntraVENous 2 times per day    guaiFENesin  600 mg Oral BID    insulin lispro  0-4 Units SubCUTAneous TID WC    insulin lispro  0-4 Units SubCUTAneous Nightly    metoprolol  100 mg Oral BID    dilTIAZem  60 mg Oral 2 times per day     Continuous Infusions:    dextrose      sodium chloride       PRN Meds: albuterol sulfate HFA, glucose, dextrose bolus **OR** dextrose bolus, glucagon (rDNA), dextrose, sodium chloride flush, sodium chloride, ondansetron **OR** ondansetron, magnesium hydroxide, acetaminophen **OR** acetaminophen    Data:     Vitals:  /71   Pulse 93   Temp 98.1 °F (36.7 °C) (Temporal)   Resp 18   Ht 1.803 m (5' 11\")   Wt 116.6 kg (257 lb)   SpO2 96%   BMI 35.84 kg/m²   Temp (24hrs), Av.1 °F (36.7 °C), Min:97.2 °F (36.2 °C), Max:99.7 °F (37.6 °C)    Recent Labs     24  1657 24  1933 03/15/24  0827 03/15/24  1155   POCGLU 132* 140* 101 168*         I/O  3/12/2024 Yes    Pneumonia of right lower lobe due to influenza A virus 3/12/2024 Yes    Chronic diarrhea 3/12/2024 Yes    Dieulafoy lesion (hemorrhagic) of stomach and duodenum 3/12/2024 Yes    Esophagitis determined by endoscopy 3/12/2024 Yes    Dark stools 3/12/2024 Yes    Hyponatremia 3/12/2024 Yes     Plan:     Acute hypoxic respiratory failure and Sepsis 2/2 postviral pneumonia-> worse   Hypoxic while ambulating to bathroom with SPO2 80's today. Repeat xray shows possible right effusion   MRSA nasal swab negative->  vancomycin discontinued remains on Cefepime   Negative legionella/strep  continue Flonase for post viral cough  Mucinex   Home O2 evaluation      Chronic diarrhea   start immodium if bothersome. Follow up outpatient   Qtc 426  Peptic ulcer disease  EGD showed 7 mm cratered ulcer in proximal aspect of second part with atrophic granular appearing mucosa   Biopsied for Celiac.   PPI BID for 4 weeks then once daily.   DEBBI on CKD 3b with history of nephrectomy with AG metabolic acidosis   Baseline appears to be around 2.5 per record review  Continue Sodium bicarb 1300 mg BID  Hold lisinopril and jardiance.   Obesity BMI 35 due to excess calories  Recommend weight loss   Hyponatremia  resolved  IDDM type II with hyperglycemia  Lantus 12 units QHS with correctional insulin   Paroxysmal atrial fibrillation  Not on oac due to history of GIB  Continue cardizem, metoprolol    Medical Decision Making: Medium    Disposition: hopeful d/c tomorrow if able to wean oxygen and pending final abx recommendations by ZAYDA Burks,   3/15/2024  1:18 PM   /

## 2024-03-16 VITALS
DIASTOLIC BLOOD PRESSURE: 64 MMHG | BODY MASS INDEX: 34.23 KG/M2 | HEART RATE: 79 BPM | WEIGHT: 244.5 LBS | SYSTOLIC BLOOD PRESSURE: 135 MMHG | HEIGHT: 71 IN | TEMPERATURE: 97.3 F | RESPIRATION RATE: 18 BRPM | OXYGEN SATURATION: 95 %

## 2024-03-16 LAB
ANION GAP SERPL CALCULATED.3IONS-SCNC: 15 MMOL/L (ref 9–17)
BASOPHILS # BLD: 0.04 K/UL (ref 0–0.2)
BASOPHILS NFR BLD: 0 % (ref 0–2)
BUN SERPL-MCNC: 38 MG/DL (ref 8–23)
BUN/CREAT SERPL: 15 (ref 9–20)
CALCIUM SERPL-MCNC: 8.4 MG/DL (ref 8.6–10.4)
CHLORIDE SERPL-SCNC: 98 MMOL/L (ref 98–107)
CO2 SERPL-SCNC: 17 MMOL/L (ref 20–31)
CREAT SERPL-MCNC: 2.5 MG/DL (ref 0.7–1.2)
EOSINOPHIL # BLD: 0.26 K/UL (ref 0–0.44)
EOSINOPHILS RELATIVE PERCENT: 2 % (ref 1–4)
ERYTHROCYTE [DISTWIDTH] IN BLOOD BY AUTOMATED COUNT: 13.1 % (ref 11.8–14.4)
GFR SERPL CREATININE-BSD FRML MDRD: 28 ML/MIN/1.73M2
GLUCOSE BLD-MCNC: 111 MG/DL (ref 75–110)
GLUCOSE BLD-MCNC: 157 MG/DL (ref 75–110)
GLUCOSE SERPL-MCNC: 102 MG/DL (ref 70–99)
HCT VFR BLD AUTO: 28.4 % (ref 40.7–50.3)
HGB BLD-MCNC: 8.8 G/DL (ref 13–17)
IMM GRANULOCYTES # BLD AUTO: 0.46 K/UL (ref 0–0.3)
IMM GRANULOCYTES NFR BLD: 4 %
LYMPHOCYTES NFR BLD: 1.02 K/UL (ref 1.1–3.7)
LYMPHOCYTES RELATIVE PERCENT: 10 % (ref 24–43)
MCH RBC QN AUTO: 28.9 PG (ref 25.2–33.5)
MCHC RBC AUTO-ENTMCNC: 31 G/DL (ref 28.4–34.8)
MCV RBC AUTO: 93.1 FL (ref 82.6–102.9)
MONOCYTES NFR BLD: 0.94 K/UL (ref 0.1–1.2)
MONOCYTES NFR BLD: 9 % (ref 3–12)
NEUTROPHILS NFR BLD: 75 % (ref 36–65)
NEUTS SEG NFR BLD: 7.91 K/UL (ref 1.5–8.1)
NRBC BLD-RTO: 0 PER 100 WBC
PLATELET # BLD AUTO: 286 K/UL (ref 138–453)
PMV BLD AUTO: 11 FL (ref 8.1–13.5)
POTASSIUM SERPL-SCNC: 4 MMOL/L (ref 3.7–5.3)
RBC # BLD AUTO: 3.05 M/UL (ref 4.21–5.77)
SODIUM SERPL-SCNC: 130 MMOL/L (ref 135–144)
WBC OTHER # BLD: 10.6 K/UL (ref 3.5–11.3)

## 2024-03-16 PROCEDURE — 6360000002 HC RX W HCPCS: Performed by: INTERNAL MEDICINE

## 2024-03-16 PROCEDURE — 97116 GAIT TRAINING THERAPY: CPT

## 2024-03-16 PROCEDURE — 99232 SBSQ HOSP IP/OBS MODERATE 35: CPT | Performed by: INTERNAL MEDICINE

## 2024-03-16 PROCEDURE — 2580000003 HC RX 258: Performed by: INTERNAL MEDICINE

## 2024-03-16 PROCEDURE — 85025 COMPLETE CBC W/AUTO DIFF WBC: CPT

## 2024-03-16 PROCEDURE — 82947 ASSAY GLUCOSE BLOOD QUANT: CPT

## 2024-03-16 PROCEDURE — 97110 THERAPEUTIC EXERCISES: CPT

## 2024-03-16 PROCEDURE — 6370000000 HC RX 637 (ALT 250 FOR IP): Performed by: INTERNAL MEDICINE

## 2024-03-16 PROCEDURE — 80048 BASIC METABOLIC PNL TOTAL CA: CPT

## 2024-03-16 PROCEDURE — 36415 COLL VENOUS BLD VENIPUNCTURE: CPT

## 2024-03-16 PROCEDURE — 99239 HOSP IP/OBS DSCHRG MGMT >30: CPT | Performed by: INTERNAL MEDICINE

## 2024-03-16 PROCEDURE — 97535 SELF CARE MNGMENT TRAINING: CPT | Performed by: NURSE PRACTITIONER

## 2024-03-16 PROCEDURE — 6370000000 HC RX 637 (ALT 250 FOR IP): Performed by: SPECIALIST

## 2024-03-16 PROCEDURE — 97530 THERAPEUTIC ACTIVITIES: CPT | Performed by: NURSE PRACTITIONER

## 2024-03-16 RX ORDER — LOSARTAN POTASSIUM 25 MG/1
25 TABLET ORAL DAILY
Status: DISCONTINUED | OUTPATIENT
Start: 2024-03-16 | End: 2024-03-16 | Stop reason: HOSPADM

## 2024-03-16 RX ORDER — GUAIFENESIN 600 MG/1
600 TABLET, EXTENDED RELEASE ORAL 2 TIMES DAILY
Qty: 14 TABLET | Refills: 0 | Status: SHIPPED | OUTPATIENT
Start: 2024-03-16 | End: 2024-03-23

## 2024-03-16 RX ORDER — BUMETANIDE 1 MG/1
1 TABLET ORAL DAILY
Qty: 30 TABLET | Refills: 0 | Status: ON HOLD | OUTPATIENT
Start: 2024-03-17 | End: 2024-03-23

## 2024-03-16 RX ORDER — BUMETANIDE 1 MG/1
1 TABLET ORAL DAILY
Status: DISCONTINUED | OUTPATIENT
Start: 2024-03-16 | End: 2024-03-16 | Stop reason: HOSPADM

## 2024-03-16 RX ORDER — PANTOPRAZOLE SODIUM 40 MG/1
40 TABLET, DELAYED RELEASE ORAL
Qty: 60 TABLET | Refills: 0 | Status: SHIPPED | OUTPATIENT
Start: 2024-03-16

## 2024-03-16 RX ORDER — SODIUM BICARBONATE 650 MG/1
1300 TABLET ORAL 3 TIMES DAILY
Qty: 30 TABLET | Refills: 0 | Status: ON HOLD | OUTPATIENT
Start: 2024-03-16 | End: 2024-03-23 | Stop reason: HOSPADM

## 2024-03-16 RX ORDER — LOSARTAN POTASSIUM 25 MG/1
25 TABLET ORAL DAILY
Qty: 30 TABLET | Refills: 0 | Status: SHIPPED | OUTPATIENT
Start: 2024-03-17

## 2024-03-16 RX ORDER — LEVOFLOXACIN 750 MG/1
750 TABLET, FILM COATED ORAL
Qty: 5 TABLET | Refills: 0 | Status: SHIPPED | OUTPATIENT
Start: 2024-03-16 | End: 2024-03-25

## 2024-03-16 RX ADMIN — METOPROLOL TARTRATE 100 MG: 50 TABLET, FILM COATED ORAL at 08:18

## 2024-03-16 RX ADMIN — LOSARTAN POTASSIUM 25 MG: 25 TABLET, FILM COATED ORAL at 13:48

## 2024-03-16 RX ADMIN — PANTOPRAZOLE SODIUM 40 MG: 40 TABLET, DELAYED RELEASE ORAL at 05:05

## 2024-03-16 RX ADMIN — PRAVASTATIN SODIUM 20 MG: 40 TABLET ORAL at 08:18

## 2024-03-16 RX ADMIN — CEFEPIME 2000 MG: 2 INJECTION, POWDER, FOR SOLUTION INTRAVENOUS at 02:12

## 2024-03-16 RX ADMIN — DILTIAZEM HYDROCHLORIDE 60 MG: 60 TABLET, FILM COATED ORAL at 08:18

## 2024-03-16 RX ADMIN — GUAIFENESIN 600 MG: 600 TABLET ORAL at 08:18

## 2024-03-16 RX ADMIN — DULOXETINE HYDROCHLORIDE 20 MG: 20 CAPSULE, DELAYED RELEASE ORAL at 08:17

## 2024-03-16 RX ADMIN — BUMETANIDE 1 MG: 1 TABLET ORAL at 13:47

## 2024-03-16 RX ADMIN — SODIUM BICARBONATE 1300 MG: 650 TABLET ORAL at 08:18

## 2024-03-16 RX ADMIN — SODIUM CHLORIDE, PRESERVATIVE FREE 10 ML: 5 INJECTION INTRAVENOUS at 08:18

## 2024-03-16 RX ADMIN — FLUTICASONE PROPIONATE 2 SPRAY: 50 SPRAY, METERED NASAL at 08:18

## 2024-03-16 RX ADMIN — SODIUM BICARBONATE 1300 MG: 650 TABLET ORAL at 13:48

## 2024-03-16 RX ADMIN — Medication 1 TABLET: at 08:18

## 2024-03-16 ASSESSMENT — ENCOUNTER SYMPTOMS
DIARRHEA: 1
COUGH: 1

## 2024-03-16 ASSESSMENT — PAIN SCALES - GENERAL: PAINLEVEL_OUTOF10: 4

## 2024-03-16 NOTE — PROGRESS NOTES
Discharge Note:      All discharge instructions given at this time as well as all patient belongings returned to patient. Pt denies any further questions regarding discharge at this time. Pt given also given discharge packet with unit letter, discharge instructions/restrictions and medication handouts regarding all discharge medications and side effects. Pt denies any further issues at this time.    Pt wheeled out to front discharge doors at this time.     Pt left premises without any issues in private vehicle at this time.

## 2024-03-16 NOTE — CARE COORDINATION
CM at bedside to give patient 2nd IMM. Patient already left hospital prior to CM arrival.  
Discharge planning    Egd completed yesterday. Spoke with patient and he is declining hc. Attempting to wean off O2. May need home O2 eval. Plan is home with wife. Uses CPAP.   
SPO2 94% with ambulation did not qualify for home O2    
the Discharge Plan? Yes    TRISTAN PALMA RN  Case Management Department  Ph: [phone] Fax: [phone]

## 2024-03-16 NOTE — PROGRESS NOTES
[facility]  Office: [phone]  Aung Luis DO, Mansoor Cline DO, Josse Han DO, Gregory Archibald, DO, Andrew Roberts MD, Mayra Medel MD, Ray Johnson MD, Romy Amador MD,  Mic Meraz MD, Rom Hayes MD, Casandra Washington MD,  Ely Burks DO, Dorie Anderson MD, Marcos Travis MD, Trey Luis DO, Delia Clemens MD,  Jorge Major DO, Gisel Herbert MD, Rose Yi MD, Celia Guillen MD, Matthew Hyatt MD,  Sammy Galvez MD, Mary Kay Karimi MD, Edson Hay MD, Mateo Turner MD, Stan Cline MD, Radha Hoskins MD, Dick Lam DO, Peter Isbell DO, Giles Cat MD,  Earn Mckenzie MD, Shirley Waterhouse, CNP,  Missy Chowdhury, CNP, Mikael Cai, CNP,  Shantelle Monson, DNP, Betty Boles, CNP, Yany Garcia, CNP, Zulay Ramsay CNP, Cathie Law CNP, Lynda Russell, CNP, An King, PA-C, Zoraida Sherman, PA-C, Gabby Vaughn, CNP, Floridalma Cook, CNP, Jaclyn Neff, CNP, Patsy Peterson, CNS, Yareli Montiel, CNP, Martha Prince, CNP, Tracy Schwab, CNP         [facility]   IN-PATIENT SERVICE   [facility]    Progress Note    3/16/2024    9:12 AM    Name:   Prateek Denton II  MRN:     [id number]     Acct:      [id number]   Room:   2037/2037-01  IP Day:  4  Admit Date:  3/11/2024 10:00 PM    PCP:   Lisseth Coello, TORREY - CNP  Code Status:  Full Code    Subjective:   Patient feeling better today.  Creatinine is unchanged.  He was able to ambulate with PT OT.  Home O2 eval was done today and qualify for home oxygen.  He still having some diarrhea but is otherwise feeling well.  He is afebrile no tachycardia or tachypnea    Brief History:     64-year-old male presents to the hospital for evaluation of shortness of breath, fatigue, nausea, abdominal pain and cough.  Was recently diagnosed with influenza A.  On admission x-ray showed right middle lobe/right lower lobe infiltrate.  There was concern for postviral pneumonia so started on  Paroxysmal A-fib (HCC) 3/12/2024 Yes    Pneumonia of right lower lobe due to influenza A virus 3/12/2024 Yes    Chronic diarrhea 3/12/2024 Yes    Dieulafoy lesion (hemorrhagic) of stomach and duodenum 3/12/2024 Yes    Esophagitis determined by endoscopy 3/12/2024 Yes    Dark stools 3/12/2024 Yes    Hyponatremia 3/12/2024 Yes   Plan:     Acute hypoxic respiratory failure and Sepsis 2/2 postviral pneumonia-> better   MRSA nasal swab negative  Negative legionella/strep  continue Flonase for post viral cough  Did not qualify for home oxygen  Plan to discharge home on p.o. Levaquin 7 or 50 mg every 48 hours until 3/25/2024     Chronic diarrhea   start immodium if bothersome. Follow up outpatient with gastroenterology  Qtc 426  Peptic ulcer disease  EGD showed 7 mm cratered ulcer in proximal aspect of second part with atrophic granular appearing mucosa   Biopsied for Celiac.   PPI BID for 4 weeks then once daily.   DEBBI on CKD 3b with history of nephrectomy with AG metabolic acidosis   Start low-dose ARB, and Bumex per nephrology.  Fluid restriction 1200 cc on discharge.  Needs follow-up with nephrology to discuss SGLT2 inhibitors and management of his nephrotic syndrome  Obesity BMI 35 due to excess calories  Recommend weight loss   Hyponatremia  resolved  IDDM type II with hyperglycemia  Lantus 12 units QHS with correctional insulin   Paroxysmal atrial fibrillation  Not on oac due to history of GIB  Continue cardizem, metoprolol    Medical Decision Making: Medium    Disposition: Discharge today    Ely Burks DO  3/16/2024  9:12 AM   /

## 2024-03-16 NOTE — PLAN OF CARE
Problem: Chronic Conditions and Co-morbidities  Goal: Patient's chronic conditions and co-morbidity symptoms are monitored and maintained or improved  Outcome: Completed     Problem: Discharge Planning  Goal: Discharge to home or other facility with appropriate resources  Outcome: Completed     Problem: Pain  Goal: Verbalizes/displays adequate comfort level or baseline comfort level  Outcome: Completed     Problem: Skin/Tissue Integrity  Goal: Absence of new skin breakdown  Description: 1.  Monitor for areas of redness and/or skin breakdown  2.  Assess vascular access sites hourly  3.  Every 4-6 hours minimum:  Change oxygen saturation probe site  4.  Every 4-6 hours:  If on nasal continuous positive airway pressure, respiratory therapy assess nares and determine need for appliance change or resting period.  Outcome: Completed     Problem: Safety - Adult  Goal: Free from fall injury  Outcome: Completed     Problem: ABCDS Injury Assessment  Goal: Absence of physical injury  Outcome: Completed     Problem: Respiratory - Adult  Goal: Achieves optimal ventilation and oxygenation  Outcome: Completed     Problem: Cardiovascular - Adult  Goal: Maintains optimal cardiac output and hemodynamic stability  Outcome: Completed  Goal: Absence of cardiac dysrhythmias or at baseline  Outcome: Completed     Problem: Infection - Adult  Goal: Absence of infection at discharge  Outcome: Completed     Problem: Hematologic - Adult  Goal: Maintains hematologic stability  Outcome: Completed     Problem: Gastrointestinal - Adult  Goal: Minimal or absence of nausea and vomiting  Outcome: Completed  Goal: Maintains or returns to baseline bowel function  Outcome: Completed  Goal: Maintains adequate nutritional intake  Outcome: Completed     Problem: Nutrition Deficit:  Goal: Optimize nutritional status  Outcome: Completed  Flowsheets (Taken 3/16/2024 1313 by Zulay Tejada, RD, LD)  Nutrient intake appropriate for improving, restoring, or

## 2024-03-16 NOTE — PROGRESS NOTES
Occupational Therapy  Facility/Department: [facility] CVICU  Daily Treatment Note  NAME: Prateek Denton II  : 1960  MRN: [id number]    Date of Service: 3/16/2024    Discharge Recommendations:  Patient would benefit from continued therapy after discharge  Due to recent hospitalization and medical condition, pt would benefit from additional intermittent skilled therapy at time of discharge.  Please refer to the AM-PAC score for current functional status.   OT Equipment Recommendations  ADL Assistive Devices: Long-handled Shoe Horn;Long-handled Sponge;Emergency Alert System;Reacher;Sock-Aid Hard      Patient Diagnosis(es): The primary encounter diagnosis was Pneumonia of right lower lobe due to infectious organism. Diagnoses of Sepsis, due to unspecified organism, unspecified whether acute organ dysfunction present (HCC) and Gastrointestinal hemorrhage, unspecified gastrointestinal hemorrhage type were also pertinent to this visit.     Assessment    Assessment: Pt progressing towards goals. Pt would benefit from continued skilled OT services to address deficits in areas of functional balance, functional reach, ADL completion, safety awareness, ADL transfers, and UE strength, all to ensure safe return home to [facility] and decrease caregiver burden.     Activity Tolerance: Patient tolerated treatment well  Discharge Recommendations: Patient would benefit from continued therapy after discharge      Plan   Occupational Therapy Plan  Times Per Week: 4-5x/wk 1x/day as netta  Current Treatment Recommendations: Strengthening;Balance training;Functional mobility training;Endurance training;Safety education & training;Equipment evaluation, education, & procurement;Patient/Caregiver education & training;Self-Care / ADL;Home management training     Restrictions  Restrictions/Precautions  Restrictions/Precautions: General Precautions;Fall Risk  Required Braces or Orthoses?: No  Position Activity Restriction  Other position/activity  restrictions: Up w/ assist, O2 NC, LUE IV, continuous pulse ox, Flu+ last admission, C-diff neg    Subjective   Subjective  Subjective: Pt agreeable to therapy.  Orientation  Overall Orientation Status: Within Functional Limits  Cognition  Overall Cognitive Status: Exceptions  Problem Solving: Decreased awareness of errors  Insights: Decreased awareness of deficits        Objective    Vitals  O2 Device: None (Room air)  Comment: Pt on room air upon arrival, then completing functional mobility in higu[place] with RN. O2 NC placed on pt upon exiting due to desat when with RN.  Bed Mobility Training  Bed Mobility Training:  (Sitting EOB upon)  Transfer Training  Transfer Training: Yes  Overall Level of Assistance: Stand-by assistance  Interventions: Safety awareness training  Sit to Stand: Stand-by assistance  Stand to Sit: Stand-by assistance  Toilet Transfer: Stand-by assistance  Gait  Gait Training: Yes  Overall Level of Assistance: Stand-by assistance     ADL  Toileting: Stand by assistance;Increased time to complete (Pt completing toileting with increased time to complete due to loose stools. PCT notified that pt was on toilet and had been instructed to pull the call light cord when ready to transfer off toilet. Pt verbalized good understanding)        Safety Devices  Type of Devices: Call light within reach;Gait belt;Nurse notified;All fall risk precautions in place;Patient at risk for falls     Patient Education  Education Given To: Patient  Education Provided: Role of Therapy;Plan of Care;Precautions;Transfer Training;Fall Prevention Strategies  Education Method: Verbal  Barriers to Learning: None  Education Outcome: Verbalized understanding;Demonstrated understanding;Continued education needed    Goals  Short Term Goals  Time Frame for Short Term Goals: By discharge, pt to demo  Short Term Goal 1: ADL transfers and functional mobility to Mod I with use of AD as needed.  Short Term Goal 2: total body ADLs to Mod I

## 2024-03-16 NOTE — PLAN OF CARE
Problem: Chronic Conditions and Co-morbidities  Goal: Patient's chronic conditions and co-morbidity symptoms are monitored and maintained or improved  Outcome: Progressing  Flowsheets (Taken 3/15/2024 2000)  Care Plan - Patient's Chronic Conditions and Co-Morbidity Symptoms are Monitored and Maintained or Improved:   Monitor and assess patient's chronic conditions and comorbid symptoms for stability, deterioration, or improvement   Update acute care plan with appropriate goals if chronic or comorbid symptoms are exacerbated and prevent overall improvement and discharge   Collaborate with multidisciplinary team to address chronic and comorbid conditions and prevent exacerbation or deterioration     Problem: Discharge Planning  Goal: Discharge to home or other facility with appropriate resources  Outcome: Progressing  Flowsheets (Taken 3/15/2024 2000)  Discharge to home or other facility with appropriate resources:   Identify barriers to discharge with patient and caregiver   Arrange for needed discharge resources and transportation as appropriate     Problem: Pain  Goal: Verbalizes/displays adequate comfort level or baseline comfort level  Outcome: Progressing  Flowsheets (Taken 3/15/2024 2000)  Verbalizes/displays adequate comfort level or baseline comfort level:   Administer analgesics based on type and severity of pain and evaluate response   Encourage patient to monitor pain and request assistance   Assess pain using appropriate pain scale   Implement non-pharmacological measures as appropriate and evaluate response     Problem: Skin/Tissue Integrity  Goal: Absence of new skin breakdown  Description: 1.  Monitor for areas of redness and/or skin breakdown  2.  Assess vascular access sites hourly  3.  Every 4-6 hours minimum:  Change oxygen saturation probe site  4.  Every 4-6 hours:  If on nasal continuous positive airway pressure, respiratory therapy assess nares and determine need for appliance change or

## 2024-03-16 NOTE — PROGRESS NOTES
Physical Therapy  Facility/Department: [facility]      NAME: Prateek Denton II  : 1960 (64 y.o.)  MRN: [id number]  CODE STATUS: Full Code    Date of Service: 3/16/24      Past Medical History:   Diagnosis Date    Acute blood loss anemia 2022    Arthritis     BILATERAL KNEESand right shoulder    Back pain     Cellulitis 2015    Colostomy RUQ 2020    Frequent headaches     10/28/2020 PATIENT STATES EVERY OTHER DAY.    Gastrointestinal hemorrhage with melena 2022    Gout     Hemorrhagic shock (HCC) 2023    Hernia of abdominal wall     History of atrial fibrillation     AFTER SURGERY ON 2020 WENT INTO A FIB. CONVERTED BACK TO NORMAL RHYTHM ON HIS OWN. CARDIOLOGIST DR STEIN    History of blood transfusion     NO REACTION    [place] (hard of hearing)     HTN     KARLO Slaughter CNP    Hyperkalemia     Hyperlipidemia     Incisional hernia     Kidney infection     Large bowel obstruction (HCC) 2020    Morbid obesity due to excess calories (HCC) 10/04/2017    Paroxysmal A-fib (HCC) 2024    SBO (small bowel obstruction) ([facility]) 2023    Sepsis ([facility])     post surgery. leak in bowel    Single kidney     Sleep apnea     USES CPAP MACHINE    T2DM     KARLO Slaughter CNP. diet controlled    Tooth missing     RIGHT UPPER 2ND MOLAR    Under care of team 2021    Dr. Cagle [facility]    Upper GI bleed 2023    Wears glasses     READING    Wellness examination 2021    Lisseth Slaughter CNP [place] [facility] [place]      Past Surgical History:   Procedure Laterality Date    ADENOIDECTOMY      TWICE AS A CHILD    ANKLE SURGERY Right     RUPTURED ACHILES    CARPAL TUNNEL RELEASE Bilateral     COLONOSCOPY  2023    diagnostic    COLONOSCOPY N/A 2023    COLONOSCOPY DIAGNOSTIC performed by Cosme Hunter MD at [facility] OR    COLOSTOMY      temporary    CYSTO/URETERO/PYELOSCOPY, CALCULUS TX Right 10/30/2020    HOLMIUM-STANDBY, CYSTOSCOPY, URETEROSCOPY, STENT EXCHANGE performed by

## 2024-03-16 NOTE — PROGRESS NOTES
Nephrology Progress Note    Lisseth Ko II, APRN - CNP    Follow Up for (CC) : Acute kidney injury, acidosis, electrolyte imbalance    ASSESSMENT     Acute kidney injury, hemodynamically related. .   Underlying CKD stage 3B with baseline GFR of 30-40s ml/min per labs from 2022.  Nephrotic syndrome - diabetic kidney disease  S/p left nephrectomy September 2, 2020  Hyponatremia  AG metabolic acidosis.  Single R kidney since 2020.  Anemia with Hemoccult positive stools.     Principal Problem:    Sepsis (HCC)  Active Problems:    Essential hypertension    Class 2 severe obesity due to excess calories with serious comorbidity and body mass index (BMI) of 35.0 to 35.9 in adult (HCC)    CKD (chronic kidney disease) stage 4, GFR 15-29 ml/min (HCC)    Sleep apnea    Type 2 diabetes mellitus with diabetic nephropathy, with long-term current use of insulin (HCC)    Acute kidney injury superimposed on CKD (HCC)    Paroxysmal A-fib (HCC)    Pneumonia of right lower lobe due to influenza A virus    Ileus (HCC)    Pneumonia of right lower lobe due to infectious organism    Chronic diarrhea    Dieulafoy lesion (hemorrhagic) of stomach and duodenum    Esophagitis determined by endoscopy    Dark stools    Hyponatremia  Resolved Problems:    * No resolved hospital problems. *      PLAN     Will give Bumex oral for volume control with patient having nephrotic syndrome   Bumex will also help for some degree of free water loss   Oral fluid restriction for hyponatremia at 1,200 ml/24 hours  Will start low-dose ARB's considering his nephrotic syndrome hypertension   We will need to address SGLT T2 inhibitors medications as outpatient once patient is stable and at his baseline level regarding his renal functions  Hence an outpatient nephrology follow-up is recommended as well  See orders    Please do not hesitate to call with questions.    SUBJECTIVE      Patient seen and examined the bedside  He denies any  ckd    FINDINGS:    Kidneys:    The right kidney measures 12 cm in length, status post left nephrectomy.    The right kidney is of normal echogenicity.  No hydronephrosis.  No renal  stones.      Bladder:    Bladder appears unremarkable    Impression  Unremarkable appearing right kidney and bladder    RECOMMENDATIONS:  Unavailable        Luca Duffy MD MD  [facility].  Ph: [phone]

## 2024-03-16 NOTE — PROGRESS NOTES
Nutrition Assessment     Type and Reason for Visit: Reassess    Nutrition Recommendations/Plan:   ADULT DIET; Regular; 1200 ml; Lactose-Controlled  Monitor p.o intakes, weights and labs     Malnutrition Assessment:  Malnutrition Status: No malnutrition    Nutrition Assessment:  Patient is tolerating Regular diet but continues to have diarrhea. Repeat occult stool culture was negative (3/12), current hemoglobin level is 8.8 (L) which is low but levels are stable. Patient is currently on a 1200 mL fluid restriction to help bring up below normal sodium levels. Current sodium is at 130 (L). Weight change from 257 lbs (3/15) to 244 lb today (3/16) is noted. Current weight is likely not accurate. Previous weight of 257 lbs will be used for estimated calorie needs. Monitor p.o intakes, weights and labs.    Estimated Daily Nutrient Needs:  Energy (kcal):  9742-9557 kcal (15-18 kcal/kg) Weight Used for Energy Requirements: Other (Comment) (from 3/15/24)     Protein (g):   grams per day using 1.2-1.3 g/kg of ideal body weight Weight Used for Protein Requirements: Ideal        Fluid (ml/day):  1200 mL per day per diet order Method Used for Fluid Requirements: 1 ml/kcal    Nutrition Related Findings:   No edema. Active bowel sounds- diarrhea last nigh (3/16) Wound Type: None    Current Nutrition Therapies:    ADULT DIET; Regular; 1200 ml; Lactose-Controlled    Anthropometric Measures:  Height: 180.3 cm (5' 11\")  Current Body Wt: 110.7 kg (244 lb) (likely inaccurate)   BMI: 34    Nutrition Diagnosis:   Inadequate oral intake related to inadequate protein-energy intake as evidenced by intake 26-50%, intake 51-75%  Altered nutrition-related lab values related to renal dysfunction as evidenced by lab values    Nutrition Interventions:   Food and/or Nutrient Delivery: Continue Current Diet  Nutrition Education/Counseling: Education not indicated  Coordination of Nutrition Care: Continue to monitor while inpatient

## 2024-03-16 NOTE — DISCHARGE SUMMARY
[facility]  Office: [phone]  Aung Luis DO, Mansoor Cline DO, Josse Han DO, Gregory Archibald DO, Andrew Roberts MD, Mayra Medel MD, Ray Johnson MD, Romy Amador MD,  Mic Meraz MD, Rom Hayes MD, Casandra Washington MD,  Ely Burks DO, Dorie Anderson MD, Marcos Travis MD, Trey uLis DO, Delia Clemens MD,  Jorge Major DO, Gisel Herbert MD, Rose Yi MD, Celia Guillen MD, Matthew Hyatt MD,  Sammy Galvez MD, Mary Kay Karimi MD, Edson Hay MD, Mateo Turner MD, Stan Cline MD, Radha Hoskins MD, Dick Lam DO, Peter Isbell DO, Giles Cat MD,  Eran Mckenzie MD, Shirley Waterhouse, CNP,  Missy Chowdhury CNP, Mikael Cai, CNP,  Shantelle Monson, DNP, Betty Boles, CNP, Yany Garcia, CNP, Zulay Ramsay CNP, Cathie Law CNP, Lynda Russell, CNP, An King, PA-C, Zoraida Sherman, PA-C, Gabby Vaughn, CNP, Floridalma Cook, CNP, Jaclyn Neff, CNP, Patsy Peterson, CNS, Yareli Montiel, CNP, Martha Prince CNP, Tracy Schwab, CNP         [facility]   IN-PATIENT SERVICE   [facility]    Discharge Summary     Patient ID: Prateek Denton II  :  1960   MRN: [id number]     ACCOUNT:  [id number]   Patient's PCP: Lisseth Coello APRN - CNP  Admit Date: 3/11/2024   Discharge Date: 3/16/2024     Length of Stay: 4  Code Status:  Full Code  Admitting Physician: Ely MCMAHON DO  Discharge Physician: Ely Burks DO     Active Discharge Diagnoses:     Hospital Problem Lists:  Principal Problem:    Sepsis (HCC)  Active Problems:    Pneumonia of right lower lobe due to infectious organism    Ileus (HCC)    Essential hypertension    Class 2 severe obesity due to excess calories with serious comorbidity and body mass index (BMI) of 35.0 to 35.9 in adult (HCC)    CKD (chronic kidney disease) stage 4, GFR 15-29 ml/min (HCC)    Sleep apnea    Type 2 diabetes mellitus with diabetic nephropathy, with  take this            CONTINUE taking these medications      albuterol sulfate  (90 Base) MCG/ACT inhaler  Commonly known as: Ventolin HFA  Inhale 2 puffs into the lungs 4 times daily as needed for Wheezing or Shortness of Breath     Azelastine HCl 137 MCG/SPRAY Soln     CareTouch CPAP & BIPAP Hose Misc     CPAP Machine [facility]     [employer] Blood Glucose Meter w/Device Kit  1 each by Does not apply route in the morning and at bedtime     dilTIAZem 60 MG tablet  Commonly known as: CARDIZEM  TAKE 1 TABLET BY MOUTH IN THE MORNING AND 1 TABLET IN THE EVENING.     DULoxetine 20 MG extended release capsule  Commonly known as: CYMBALTA  TAKE 1 CAPSULE BY MOUTH EVERY DAY     ferrous sulfate 325 (65 Fe) MG EC tablet  Commonly known as: FE TABS 325     fluticasone 50 MCG/ACT nasal spray  Commonly known as: FLONASE     FreeStyle Onur 3 Sensor Misc  Change sensor every 14 days for continuous glucose monitoring.     hydrOXYzine HCl 50 MG tablet  Commonly known as: ATARAX  TAKE 1 TABLET BY MOUTH EVERY DAY AT NIGHT     [employer] Pen [place] 31G 31G X 8 MM Misc  Generic drug: Insulin Pen Needle  Use to inject insulin up to 4 times daily     Lancets Misc  1 each by Does not apply route 3 times daily     Lantus SoloStar 100 UNIT/ML injection pen  Generic drug: insulin glargine  Inject 20-30 units of insulin subcutaneously once daily as directed     Melatonin 10 MG Tabs     metoprolol 100 MG tablet  Commonly known as: LOPRESSOR     pravastatin 20 MG tablet  Commonly known as: PRAVACHOL  TAKE 1 TABLET BY MOUTH EVERY DAY     therapeutic multivitamin-minerals tablet  Take 1 tablet by mouth daily     triamcinolone 0.1 % cream  Commonly known as: KENALOG  Apply twice daily for 2 weeks     vitamin C 500 MG tablet  Commonly known as: ASCORBIC ACID     vitamin D 50 MCG (2000 UT) Caps capsule     vitamin E 1000 units capsule            STOP taking these medications      deon-donn Tabs               Where to Get Your Medications        Information

## 2024-03-16 NOTE — DISCHARGE INSTR - COC
Continuity of Care Form    Patient Name: Prateek Denton II   :  1960  MRN:  [id number]    Admit date:  3/11/2024  Discharge date:  ***    Code Status Order: Full Code   Advance Directives:     Admitting Physician:  Ely MCMAHON DO  PCP: Lisseth Coello, APRN - CNP    Discharging Nurse: ***  Discharging Hospital Unit/Room#:   Discharging Unit Phone Number: ***    Emergency Contact:   Extended Emergency Contact Information  Primary Emergency Contact: Valencia Denton  Address: [address] [place] [address]  Home Phone: [phone]  Work Phone: [phone]  Mobile Phone: [phone]  Relation: Spouse    Past Surgical History:  Past Surgical History:   Procedure Laterality Date    ADENOIDECTOMY      TWICE AS A CHILD    ANKLE SURGERY Right     RUPTURED ACHILES    CARPAL TUNNEL RELEASE Bilateral     COLONOSCOPY  2023    diagnostic    COLONOSCOPY N/A 2023    COLONOSCOPY DIAGNOSTIC performed by Cosme Hunter MD at [facility] OR    COLOSTOMY      temporary    CYSTO/URETERO/PYELOSCOPY, CALCULUS TX Right 10/30/2020    HOLMIUM-STANDBY, CYSTOSCOPY, URETEROSCOPY, STENT EXCHANGE performed by Donal Cano MD at [facility] OR    CYSTOSCOPY Right 2020    CYSTOSCOPY RIGHT URETERAL STENT INSERTION performed by Jeff Johnson MD at [facility] OR    HC CATH POWER PICC TRIPLE  2020    REMOVED AROUND 10/07/2020    KIDNEY SURGERY Left 2020    LAPAROSCOPIC XI ROBOTIC NEPHRECTOMY performed by Brandyn Garcia MD at [facility] OR    KNEE ARTHROSCOPY Bilateral     LAPAROTOMY N/A 2020    LAPAROTOMY EXPLORATORY performed by Jagjit Doty DO at [facility] OR    SMALL INTESTINE SURGERY N/A 2020    EXPLORATORY LAPAROTOMY, RESECTION OF PROXIMAL JEJUNUM AND DESCENDING COLON WITH MOBILIZATION OF SPLENIC FLEXURE AND PRIMARY ANASTAMOSISOF SMALL BOWEL AND COLON, PLACEMENT OF GASTROSTOMY TUBE performed by Jagjit Doty DO at [facility] OR    SMALL INTESTINE SURGERY N/A 2020    EXPLORATORY  Summary (Last 24 hours) at 3/16/2024 1439  Last data filed at 3/16/2024 0759  Gross per 24 hour   Intake --   Output 1150 ml   Net -1150 ml     I/O last 3 completed shifts:  In: -   Out: 2325 [Urine:2325]    Safety Concerns:     { CRISTOFER Safety Concerns:067107280}    Impairments/Disabilities:      { CRISTOFER Impairments/Disabilities:042527709}    Nutrition Therapy:  Current Nutrition Therapy:   { CRISTOFER Diet List:939423521}    Routes of Feeding: {[facility] DME Other Feedings:729745922}  Liquids: {Slp liquid thickness:99150}  Daily Fluid Restriction: {[facility] DME Yes amt example:648359546}  Last Modified Barium Swallow with Video (Video Swallowing Test): {Done Not Done Date:}    Treatments at the Time of Hospital Discharge:   Respiratory Treatments: ***  Oxygen Therapy:  {Therapy; copd oxygen:00212}  Ventilator:    { CC Vent List:290769933}    Rehab Therapies: {THERAPEUTIC INTERVENTION:0429100119}  Weight Bearing Status/Restrictions: {[facility] Weight Bearin}  Other Medical Equipment (for information only, NOT a DME order):  {EQUIPMENT:017771339}  Other Treatments: ***    Patient's personal belongings (please select all that are sent with patient):  {[facility] DME Belongings:353989649}    RN SIGNATURE:  {Esignature:524145873}    CASE MANAGEMENT/SOCIAL WORK SECTION    Inpatient Status Date: ***    Readmission Risk Assessment Score:  Readmission Risk              Risk of Unplanned Readmission:  32           Discharging to Facility/ Agency   Name:   Address:  Phone:  Fax:    Dialysis Facility (if applicable)   Name:  Address:  Dialysis Schedule:  Phone:  Fax:    / signature: {Esignature:398612697}    PHYSICIAN SECTION    Prognosis: {Prognosis:4222551708}    Condition at Discharge: { Patient Condition:430340091}    Rehab Potential (if transferring to Rehab): {Prognosis:3134134904}    Recommended Labs or Other Treatments After Discharge: ***    Physician Certification: I certify the above

## 2024-03-16 NOTE — PROGRESS NOTES
infection with Leginella pnemophila serogroup 1 nor does  it rule out other microbial-caused respiratory infections of disease caused by other  serogroups of Legionella pneumophila.      STREP PNEUMONIAE ANTIGEN [[id number]] Collected: 03/12/24 1322   Order Status: Completed Specimen: Urine, clean catch Updated: 03/12/24 1947    Source .URINE    Strep pneumo Ag NEGATIVE    Comment: Strep pneumoniae antigen not detected      MRSA DNA Probe, Nasal [[id number]] Collected: 03/12/24 1115   Order Status: Canceled Specimen: Nares    MRSA DNA Probe, Nasal [[id number]] Collected: 03/12/24 1045   Order Status: Canceled Specimen: Nares    Sputum gram stain [[id number]] Collected: 03/12/24 0245   Order Status: Canceled Specimen: Sputum Expectorated    COVID-19 & Influenza Combo [[id number]] (Abnormal) Collected: 03/11/24 2255   Order Status: Completed Specimen: Nasopharyngeal Swab Updated: 03/11/24 2347    Specimen Description .NASOPHARYNGEAL SWAB    Source .NASOPHARYNGEAL SWAB    SARS-CoV-2 RNA, RT PCR Not Detected    Comment:       Testing was performed using EVELIO Brionna SARS-CoV-2 and Influenza A/B nucleic acid assay.  This test is a multiplex Real-Time Reverse Transcriptase Polymerase Chain Reaction  (RT-PCR)-based in vitro diagnostic test intended for the qualitative detection of nucleic  acids from SARS-CoV-2,  influenza A, and influenza B in nasopharyngeal and nasal swab specimens for use under the  FDA's Emergency Use Authorization (EUA) only.       Not Detected results do not preclude SARS-CoV-2 infection and should not be used as the sole   basis for patient management decisions.  Negative results must be combined with clinical observations, patient history, and  epidemiological information.       Fact sheet for Patients: https://www.fda.gov/media/251283/download  Fact sheet for Healthcare Providers: https://www.fda.gov/media/940745/download       Results reported to the appropriate Health Department       INFLUENZA A  DETECTED Abnormal     INFLUENZA B Not Detected       Medications:      bumetanide  1 mg Oral Daily    losartan  25 mg Oral Daily    pantoprazole  40 mg Oral BID AC    sodium bicarbonate  1,300 mg Oral TID    cefepime  2,000 mg IntraVENous Q12H    deon-donn  1 tablet Oral Daily    DULoxetine  20 mg Oral Daily    [Held by provider] ferrous sulfate  325 mg Oral Daily    fluticasone  2 spray Nasal BID    hydrOXYzine HCl  50 mg Oral Nightly    insulin glargine  12 Units SubCUTAneous Nightly    pravastatin  20 mg Oral Daily    sodium chloride flush  5-40 mL IntraVENous 2 times per day    guaiFENesin  600 mg Oral BID    insulin lispro  0-4 Units SubCUTAneous TID WC    insulin lispro  0-4 Units SubCUTAneous Nightly    metoprolol  100 mg Oral BID    dilTIAZem  60 mg Oral 2 times per day       Electronically signed by Anselmo Martínez MD on 3/16/2024 at 11:41 AM      [facility]  Anselmo Martínez MD  [employer] messaging  OFFICE: [phone]    Thank you for allowing us to participate in the care of this patient. Please call with questions.    This note is created with the assistance of a speech recognition program.  While intending to generate a document that actually reflects the content of the visit, the document can still have some errors including those of syntax and sound a like substitutions which may escape proof reading.  In such instances, actual meaning can be extrapolated by contextual diversion.

## 2024-03-16 NOTE — DISCHARGE INSTRUCTIONS
-Continue Protonix 40 mg twice daily for 4 weeks.  Follow-up with gastroenterology to go over the results of your EGD and biopsy  -Continue take antibiotics until 3/25/2024  -Follow-up with nephrology for workup of your chronic kidney disease  -Continue 1200 cc fluid restriction and Bumex  -Repeat BMP in 1 week  -Return to hospital as needed for worsening shortness of breath, chest pain, difficulty breathing

## 2024-03-16 NOTE — PROGRESS NOTES
Home O2 eval completed, RN walked pt down higuera and back aproximately 120ft, pt sats between 88-90% on RA ambulating and stated to feel SOB and tired towards the end. Attending notified, pt in bed and recovered back to >90% w rest

## 2024-03-17 LAB
MICROORGANISM SPEC CULT: NORMAL
MICROORGANISM SPEC CULT: NORMAL
SERVICE CMNT-IMP: NORMAL
SERVICE CMNT-IMP: NORMAL
SPECIMEN DESCRIPTION: NORMAL
SPECIMEN DESCRIPTION: NORMAL

## 2024-03-18 LAB — SURGICAL PATHOLOGY REPORT: NORMAL

## 2024-03-21 ENCOUNTER — OFFICE VISIT (OUTPATIENT)
Dept: FAMILY MEDICINE CLINIC | Age: 64
End: 2024-03-21

## 2024-03-21 ENCOUNTER — NURSE ONLY (OUTPATIENT)
Dept: PRIMARY CARE CLINIC | Age: 64
End: 2024-03-21

## 2024-03-21 ENCOUNTER — HOSPITAL ENCOUNTER (OUTPATIENT)
Age: 64
Setting detail: SPECIMEN
Discharge: HOME | End: 2024-03-21

## 2024-03-21 VITALS
TEMPERATURE: 97.8 F | SYSTOLIC BLOOD PRESSURE: 118 MMHG | BODY MASS INDEX: 33.8 KG/M2 | HEIGHT: 71 IN | WEIGHT: 241.4 LBS | HEART RATE: 113 BPM | DIASTOLIC BLOOD PRESSURE: 74 MMHG | OXYGEN SATURATION: 90 %

## 2024-03-21 DIAGNOSIS — E44.0 MODERATE MALNUTRITION (HCC): ICD-10-CM

## 2024-03-21 DIAGNOSIS — N18.9 ACUTE KIDNEY INJURY SUPERIMPOSED ON CKD (HCC): Primary | ICD-10-CM

## 2024-03-21 DIAGNOSIS — K56.7 ILEUS (HCC): ICD-10-CM

## 2024-03-21 DIAGNOSIS — D64.9 ANEMIA, UNSPECIFIED TYPE: ICD-10-CM

## 2024-03-21 DIAGNOSIS — I10 ESSENTIAL HYPERTENSION: ICD-10-CM

## 2024-03-21 DIAGNOSIS — N18.9 ACUTE KIDNEY INJURY SUPERIMPOSED ON CKD (HCC): ICD-10-CM

## 2024-03-21 DIAGNOSIS — N17.9 ACUTE KIDNEY INJURY SUPERIMPOSED ON CKD (HCC): ICD-10-CM

## 2024-03-21 DIAGNOSIS — J18.9 PNEUMONIA OF RIGHT LOWER LOBE DUE TO INFECTIOUS ORGANISM: ICD-10-CM

## 2024-03-21 DIAGNOSIS — F51.02 ADJUSTMENT INSOMNIA: ICD-10-CM

## 2024-03-21 DIAGNOSIS — J18.9 PNEUMONIA OF RIGHT LOWER LOBE DUE TO INFECTIOUS ORGANISM: Primary | ICD-10-CM

## 2024-03-21 DIAGNOSIS — N17.9 ACUTE KIDNEY INJURY SUPERIMPOSED ON CKD (HCC): Primary | ICD-10-CM

## 2024-03-21 LAB
25(OH)D3 SERPL-MCNC: 27.1 NG/ML (ref 30–100)
ALBUMIN SERPL-MCNC: 3.3 G/DL (ref 3.5–5.2)
ALBUMIN/GLOB SERPL: 1 {RATIO} (ref 1–2.5)
ALP SERPL-CCNC: 113 U/L (ref 40–129)
ALT SERPL-CCNC: 98 U/L (ref 10–50)
ANION GAP SERPL CALCULATED.3IONS-SCNC: 18 MMOL/L (ref 9–16)
AST SERPL-CCNC: 66 U/L (ref 10–50)
BASOPHILS # BLD: 0 K/UL (ref 0–0.2)
BASOPHILS NFR BLD: 0 % (ref 0–2)
BILIRUB SERPL-MCNC: 0.3 MG/DL (ref 0–1.2)
BUN SERPL-MCNC: 59 MG/DL (ref 8–23)
CALCIUM SERPL-MCNC: 9.6 MG/DL (ref 8.6–10.4)
CHLORIDE SERPL-SCNC: 103 MMOL/L (ref 98–107)
CO2 SERPL-SCNC: 19 MMOL/L (ref 20–31)
CREAT SERPL-MCNC: 3.4 MG/DL (ref 0.7–1.2)
EOSINOPHIL # BLD: 0.67 K/UL (ref 0–0.4)
EOSINOPHILS RELATIVE PERCENT: 4 % (ref 1–4)
ERYTHROCYTE [DISTWIDTH] IN BLOOD BY AUTOMATED COUNT: 13.3 % (ref 11.8–14.4)
FOLATE SERPL-MCNC: >20 NG/ML (ref 4.8–24.2)
GFR SERPL CREATININE-BSD FRML MDRD: 19 ML/MIN/1.73M2
GLUCOSE SERPL-MCNC: 194 MG/DL (ref 74–99)
HCT VFR BLD AUTO: 35 % (ref 40.7–50.3)
HGB BLD-MCNC: 11.1 G/DL (ref 13–17)
IMM GRANULOCYTES # BLD AUTO: 0.34 K/UL (ref 0–0.3)
IMM GRANULOCYTES NFR BLD: 2 %
IRON SATN MFR SERPL: 21 % (ref 20–55)
IRON SERPL-MCNC: 53 UG/DL (ref 61–157)
LYMPHOCYTES NFR BLD: 3.02 K/UL (ref 1–4.8)
LYMPHOCYTES RELATIVE PERCENT: 18 % (ref 24–44)
MCH RBC QN AUTO: 28.8 PG (ref 25.2–33.5)
MCHC RBC AUTO-ENTMCNC: 31.7 G/DL (ref 28.4–34.8)
MCV RBC AUTO: 90.9 FL (ref 82.6–102.9)
MONOCYTES NFR BLD: 1.68 K/UL (ref 0.1–0.8)
MONOCYTES NFR BLD: 10 % (ref 1–7)
MORPHOLOGY: NORMAL
NEUTROPHILS NFR BLD: 66 % (ref 36–66)
NEUTS SEG NFR BLD: 11.09 K/UL (ref 1.8–7.7)
NRBC BLD-RTO: 0 PER 100 WBC
PLATELET # BLD AUTO: 663 K/UL (ref 138–453)
PMV BLD AUTO: 10.6 FL (ref 8.1–13.5)
POTASSIUM SERPL-SCNC: 4.4 MMOL/L (ref 3.7–5.3)
PROT SERPL-MCNC: 8.5 G/DL (ref 6.6–8.7)
RBC # BLD AUTO: 3.85 M/UL (ref 4.21–5.77)
SODIUM SERPL-SCNC: 140 MMOL/L (ref 136–145)
TIBC SERPL-MCNC: 255 UG/DL (ref 250–450)
UNSATURATED IRON BINDING CAPACITY: 202 UG/DL (ref 112–347)
VIT B12 SERPL-MCNC: 1482 PG/ML (ref 232–1245)
WBC OTHER # BLD: 16.8 K/UL (ref 3.5–11.3)

## 2024-03-21 RX ORDER — TRAZODONE HYDROCHLORIDE 50 MG/1
50 TABLET ORAL NIGHTLY
Qty: 14 TABLET | Refills: 0 | Status: SHIPPED | OUTPATIENT
Start: 2024-03-21 | End: 2024-04-04

## 2024-03-21 NOTE — PROGRESS NOTES
Post-Discharge Transitional Care  Follow Up      Prateek Denton II   YOB: 1960    Date of Office Visit:  3/21/2024  Date of Hospital Admission: 3/11/24  Date of Hospital Discharge: 3/16/24  Risk of hospital readmission (high >=14%. Medium >=10%) :Readmission Risk Score: 24.4      Care management risk score Rising risk (score 2-5) and Complex Care (Scores >=6): No Risk Score On File     Non face to face  following discharge, date last encounter closed (first attempt may have been earlier): *No documented post hospital discharge outreach found in the last 14 days    Call initiated 2 business days of discharge: *No response recorded in the last 14 days    ASSESSMENT/PLAN:   Acute kidney injury superimposed on CKD (HCC)  Recheck CMP  Has upcoming appt with nephrology      Chemistry            -     Comprehensive Metabolic Panel; Future    Moderate malnutrition (HCC)  Check labs d/t malnutrition    -     Vitamin D 25 Hydroxy; Future  -     Vitamin B12; Future  -     Vitamin B1; Future  -     Folate; Future  Essential hypertension  BP Readings from Last 3 Encounters:   03/21/24 118/74   03/16/24 135/64   03/05/24 (!) 142/83     Controlled today  Continue same meds for now    Anemia, unspecified type  Likely secondary to CKD  Update other labs  to eval for other causes    -     Vitamin B12; Future  -     CBC with Auto Differential; Future  -     Iron and TIBC; Future  Ileus (HCC)  Resolved has been having diarrhea more recently from abx    -     Comprehensive Metabolic Panel; Future  Pneumonia of right lower lobe due to infectious organism  Complete abx as prescribed   Recheck cxr when complete  F/u with pulmonary    -     XR CHEST (2 VW); Future  -     CBC with Auto Differential; Future  Adjustment insomnia  Failed melatonin, hydroxyzine  Call with update in next 2 weeks    -     traZODone (DESYREL) 50 MG tablet; Take 1 tablet by mouth nightly for 14 days, Disp-14 tablet, R-0Normal      Medical Decision

## 2024-03-21 NOTE — PROGRESS NOTES
Visit Information    Have you changed or started any medications since your last visit including any over-the-counter medicines, vitamins, or herbal medicines? no   Have you stopped taking any of your medications? Is so, why? -  no  Are you having any side effects from any of your medications? - no    Have you seen any other physician or provider since your last visit?  no   Have you had any other diagnostic tests since your last visit?  no   Have you been seen in the emergency room and/or had an admission in a hospital since we last saw you?  no   Have you had your routine dental cleaning in the past 6 months?  no     Do you have an active MyChart account? If no, what is the barrier?  Yes    Patient Care Team:  Lisseth Coello APRN - CNP as PCP - General (Family Nurse Practitioner)  Lisseth Coello APRN - CNP as PCP - Empaneled Provider  Darleen Johnson MD as Consulting Physician (Gastroenterology)    Medical History Review  Past Medical, Family, and Social History reviewed and  contribute to the patient presenting condition    Health Maintenance   Topic Date Due    COVID-19 Vaccine (1) Never done    Diabetic retinal exam  Never done    Pneumococcal 65+ years Vaccine (2 of 2 - PCV) 12/12/2017    Respiratory Syncytial Virus (RSV) Pregnant or age 60 yrs+ (1 - 1-dose 60+ series) Never done    Diabetic foot exam  02/01/2022    Flu vaccine (1) Never done    Lipids  12/19/2023    DTaP/Tdap/Td vaccine (1 - Tdap) 04/10/2024 (Originally 1/13/1977)    Shingles vaccine (1 of 2) 05/10/2024 (Originally 1/13/2008)    A1C test (Diabetic or Prediabetic)  02/07/2025    Depression Screen  02/27/2025    Diabetic Alb to Cr ratio (uACR) test  02/28/2025    GFR test (Diabetes, CKD 3-4, OR last GFR 15-59)  03/16/2025    Colorectal Cancer Screen  04/18/2033    Hepatitis C screen  Completed    Hepatitis A vaccine  Aged Out    Hepatitis B vaccine  Aged Out    Hib vaccine  Aged Out    Polio vaccine  Aged Out    Meningococcal (ACWY)

## 2024-03-22 ENCOUNTER — APPOINTMENT (OUTPATIENT)
Dept: CT IMAGING | Age: 64
End: 2024-03-22
Payer: COMMERCIAL

## 2024-03-22 ENCOUNTER — HOSPITAL ENCOUNTER (OUTPATIENT)
Age: 64
Setting detail: OBSERVATION
Discharge: HOME HEALTH | End: 2024-03-23
Attending: EMERGENCY MEDICINE | Admitting: STUDENT IN AN ORGANIZED HEALTH CARE EDUCATION/TRAINING PROGRAM
Payer: COMMERCIAL

## 2024-03-22 ENCOUNTER — APPOINTMENT (OUTPATIENT)
Dept: GENERAL RADIOLOGY | Age: 64
End: 2024-03-22
Payer: COMMERCIAL

## 2024-03-22 DIAGNOSIS — R00.0 TACHYCARDIA: ICD-10-CM

## 2024-03-22 DIAGNOSIS — J18.9 PNEUMONIA DUE TO INFECTIOUS ORGANISM, UNSPECIFIED LATERALITY, UNSPECIFIED PART OF LUNG: Primary | ICD-10-CM

## 2024-03-22 DIAGNOSIS — N18.4 CKD (CHRONIC KIDNEY DISEASE) STAGE 4, GFR 15-29 ML/MIN (HCC): ICD-10-CM

## 2024-03-22 DIAGNOSIS — R79.89 ELEVATED SERUM CREATININE: ICD-10-CM

## 2024-03-22 DIAGNOSIS — R06.00 DYSPNEA, UNSPECIFIED TYPE: ICD-10-CM

## 2024-03-22 DIAGNOSIS — I48.0 PAROXYSMAL A-FIB (HCC): ICD-10-CM

## 2024-03-22 DIAGNOSIS — R79.89 ABNORMAL TSH: ICD-10-CM

## 2024-03-22 PROBLEM — E66.811 CLASS 1 OBESITY WITH SERIOUS COMORBIDITY AND BODY MASS INDEX (BMI) OF 33.0 TO 33.9 IN ADULT: Status: ACTIVE | Noted: 2024-03-22

## 2024-03-22 PROBLEM — E66.812 CLASS 2 SEVERE OBESITY DUE TO EXCESS CALORIES WITH SERIOUS COMORBIDITY AND BODY MASS INDEX (BMI) OF 35.0 TO 35.9 IN ADULT: Status: RESOLVED | Noted: 2017-10-04 | Resolved: 2024-03-22

## 2024-03-22 PROBLEM — E66.9 OBESITY WITH SERIOUS COMORBIDITY: Status: ACTIVE | Noted: 2024-03-22

## 2024-03-22 PROBLEM — R53.83 FATIGUE DUE TO DEPRESSION: Status: ACTIVE | Noted: 2024-03-22

## 2024-03-22 PROBLEM — F32.A FATIGUE DUE TO DEPRESSION: Status: ACTIVE | Noted: 2024-03-22

## 2024-03-22 PROBLEM — E66.01 CLASS 2 SEVERE OBESITY DUE TO EXCESS CALORIES WITH SERIOUS COMORBIDITY AND BODY MASS INDEX (BMI) OF 35.0 TO 35.9 IN ADULT (HCC): Status: RESOLVED | Noted: 2017-10-04 | Resolved: 2024-03-22

## 2024-03-22 LAB
ALBUMIN SERPL-MCNC: 3.3 G/DL (ref 3.5–5.2)
ALP SERPL-CCNC: 106 U/L (ref 40–129)
ALT SERPL-CCNC: 65 U/L (ref 5–41)
ANION GAP SERPL CALCULATED.3IONS-SCNC: 15 MMOL/L (ref 9–17)
AST SERPL-CCNC: 31 U/L
BASOPHILS # BLD: 0.06 K/UL (ref 0–0.2)
BASOPHILS NFR BLD: 1 % (ref 0–2)
BILIRUB DIRECT SERPL-MCNC: 0.1 MG/DL
BILIRUB INDIRECT SERPL-MCNC: 0.2 MG/DL (ref 0–1)
BILIRUB SERPL-MCNC: 0.3 MG/DL (ref 0.3–1.2)
BNP SERPL-MCNC: 544 PG/ML
BUN SERPL-MCNC: 61 MG/DL (ref 8–23)
BUN/CREAT SERPL: 19 (ref 9–20)
CALCIUM SERPL-MCNC: 9.4 MG/DL (ref 8.6–10.4)
CHLORIDE SERPL-SCNC: 98 MMOL/L (ref 98–107)
CO2 SERPL-SCNC: 23 MMOL/L (ref 20–31)
CREAT SERPL-MCNC: 3.2 MG/DL (ref 0.7–1.2)
EOSINOPHIL # BLD: 0.09 K/UL (ref 0–0.44)
EOSINOPHILS RELATIVE PERCENT: 1 % (ref 1–4)
ERYTHROCYTE [DISTWIDTH] IN BLOOD BY AUTOMATED COUNT: 13.2 % (ref 11.8–14.4)
GFR SERPL CREATININE-BSD FRML MDRD: 21 ML/MIN/1.73M2
GLUCOSE SERPL-MCNC: 148 MG/DL (ref 70–99)
HCT VFR BLD AUTO: 33.7 % (ref 40.7–50.3)
HGB BLD-MCNC: 10.8 G/DL (ref 13–17)
IMM GRANULOCYTES # BLD AUTO: 0.49 K/UL (ref 0–0.3)
IMM GRANULOCYTES NFR BLD: 4 %
LACTATE BLDV-SCNC: 1.4 MMOL/L (ref 0.5–1.9)
LIPASE SERPL-CCNC: 373 U/L (ref 13–60)
LYMPHOCYTES NFR BLD: 1.99 K/UL (ref 1.1–3.7)
LYMPHOCYTES RELATIVE PERCENT: 17 % (ref 24–43)
MAGNESIUM SERPL-MCNC: 1.7 MG/DL (ref 1.6–2.6)
MCH RBC QN AUTO: 28.9 PG (ref 25.2–33.5)
MCHC RBC AUTO-ENTMCNC: 32 G/DL (ref 28.4–34.8)
MCV RBC AUTO: 90.1 FL (ref 82.6–102.9)
MONOCYTES NFR BLD: 0.89 K/UL (ref 0.1–1.2)
MONOCYTES NFR BLD: 8 % (ref 3–12)
MYOGLOBIN SERPL-MCNC: 144 NG/ML (ref 28–72)
NEUTROPHILS NFR BLD: 70 % (ref 36–65)
NEUTS SEG NFR BLD: 8.1 K/UL (ref 1.5–8.1)
NRBC BLD-RTO: 0 PER 100 WBC
PLATELET # BLD AUTO: 561 K/UL (ref 138–453)
PMV BLD AUTO: 9.7 FL (ref 8.1–13.5)
POTASSIUM SERPL-SCNC: 3.7 MMOL/L (ref 3.7–5.3)
PROT SERPL-MCNC: 8.2 G/DL (ref 6.4–8.3)
RBC # BLD AUTO: 3.74 M/UL (ref 4.21–5.77)
SODIUM SERPL-SCNC: 136 MMOL/L (ref 135–144)
T4 FREE SERPL-MCNC: 1.9 NG/DL (ref 0.9–1.7)
TROPONIN I SERPL HS-MCNC: 61 NG/L (ref 0–22)
TROPONIN I SERPL HS-MCNC: 65 NG/L (ref 0–22)
TSH SERPL DL<=0.05 MIU/L-ACNC: 0.12 UIU/ML (ref 0.3–5)
WBC OTHER # BLD: 11.6 K/UL (ref 3.5–11.3)

## 2024-03-22 PROCEDURE — 71045 X-RAY EXAM CHEST 1 VIEW: CPT

## 2024-03-22 PROCEDURE — 84443 ASSAY THYROID STIM HORMONE: CPT

## 2024-03-22 PROCEDURE — 96375 TX/PRO/DX INJ NEW DRUG ADDON: CPT

## 2024-03-22 PROCEDURE — G0378 HOSPITAL OBSERVATION PER HR: HCPCS

## 2024-03-22 PROCEDURE — 83735 ASSAY OF MAGNESIUM: CPT

## 2024-03-22 PROCEDURE — 80048 BASIC METABOLIC PNL TOTAL CA: CPT

## 2024-03-22 PROCEDURE — 99222 1ST HOSP IP/OBS MODERATE 55: CPT | Performed by: STUDENT IN AN ORGANIZED HEALTH CARE EDUCATION/TRAINING PROGRAM

## 2024-03-22 PROCEDURE — 2580000003 HC RX 258: Performed by: STUDENT IN AN ORGANIZED HEALTH CARE EDUCATION/TRAINING PROGRAM

## 2024-03-22 PROCEDURE — 96365 THER/PROPH/DIAG IV INF INIT: CPT

## 2024-03-22 PROCEDURE — 96361 HYDRATE IV INFUSION ADD-ON: CPT

## 2024-03-22 PROCEDURE — 36415 COLL VENOUS BLD VENIPUNCTURE: CPT

## 2024-03-22 PROCEDURE — 83880 ASSAY OF NATRIURETIC PEPTIDE: CPT

## 2024-03-22 PROCEDURE — 80076 HEPATIC FUNCTION PANEL: CPT

## 2024-03-22 PROCEDURE — C9113 INJ PANTOPRAZOLE SODIUM, VIA: HCPCS | Performed by: STUDENT IN AN ORGANIZED HEALTH CARE EDUCATION/TRAINING PROGRAM

## 2024-03-22 PROCEDURE — 83874 ASSAY OF MYOGLOBIN: CPT

## 2024-03-22 PROCEDURE — 6370000000 HC RX 637 (ALT 250 FOR IP): Performed by: STUDENT IN AN ORGANIZED HEALTH CARE EDUCATION/TRAINING PROGRAM

## 2024-03-22 PROCEDURE — 6360000002 HC RX W HCPCS: Performed by: STUDENT IN AN ORGANIZED HEALTH CARE EDUCATION/TRAINING PROGRAM

## 2024-03-22 PROCEDURE — 85025 COMPLETE CBC W/AUTO DIFF WBC: CPT

## 2024-03-22 PROCEDURE — 87040 BLOOD CULTURE FOR BACTERIA: CPT

## 2024-03-22 PROCEDURE — 96372 THER/PROPH/DIAG INJ SC/IM: CPT

## 2024-03-22 PROCEDURE — 6360000002 HC RX W HCPCS: Performed by: NURSE PRACTITIONER

## 2024-03-22 PROCEDURE — 71250 CT THORAX DX C-: CPT

## 2024-03-22 PROCEDURE — 99285 EMERGENCY DEPT VISIT HI MDM: CPT

## 2024-03-22 PROCEDURE — 83605 ASSAY OF LACTIC ACID: CPT

## 2024-03-22 PROCEDURE — 84484 ASSAY OF TROPONIN QUANT: CPT

## 2024-03-22 PROCEDURE — A4216 STERILE WATER/SALINE, 10 ML: HCPCS | Performed by: STUDENT IN AN ORGANIZED HEALTH CARE EDUCATION/TRAINING PROGRAM

## 2024-03-22 PROCEDURE — 83690 ASSAY OF LIPASE: CPT

## 2024-03-22 PROCEDURE — 2580000003 HC RX 258: Performed by: NURSE PRACTITIONER

## 2024-03-22 PROCEDURE — 84439 ASSAY OF FREE THYROXINE: CPT

## 2024-03-22 PROCEDURE — 96367 TX/PROPH/DG ADDL SEQ IV INF: CPT

## 2024-03-22 RX ORDER — LOSARTAN POTASSIUM 25 MG/1
25 TABLET ORAL DAILY
Status: DISCONTINUED | OUTPATIENT
Start: 2024-03-23 | End: 2024-03-23 | Stop reason: HOSPADM

## 2024-03-22 RX ORDER — TRAZODONE HYDROCHLORIDE 50 MG/1
50 TABLET ORAL ONCE
Status: COMPLETED | OUTPATIENT
Start: 2024-03-23 | End: 2024-03-23

## 2024-03-22 RX ORDER — ALBUTEROL SULFATE 90 UG/1
2 AEROSOL, METERED RESPIRATORY (INHALATION) 4 TIMES DAILY PRN
Status: DISCONTINUED | OUTPATIENT
Start: 2024-03-22 | End: 2024-03-23 | Stop reason: HOSPADM

## 2024-03-22 RX ORDER — DILTIAZEM HYDROCHLORIDE 60 MG/1
60 TABLET, FILM COATED ORAL 2 TIMES DAILY
Status: DISCONTINUED | OUTPATIENT
Start: 2024-03-22 | End: 2024-03-23 | Stop reason: HOSPADM

## 2024-03-22 RX ORDER — 0.9 % SODIUM CHLORIDE 0.9 %
1000 INTRAVENOUS SOLUTION INTRAVENOUS ONCE
Status: COMPLETED | OUTPATIENT
Start: 2024-03-22 | End: 2024-03-22

## 2024-03-22 RX ORDER — ONDANSETRON 4 MG/1
4 TABLET, ORALLY DISINTEGRATING ORAL EVERY 8 HOURS PRN
Status: DISCONTINUED | OUTPATIENT
Start: 2024-03-22 | End: 2024-03-23 | Stop reason: HOSPADM

## 2024-03-22 RX ORDER — FLUTICASONE PROPIONATE 50 MCG
2 SPRAY, SUSPENSION (ML) NASAL 2 TIMES DAILY
Status: DISCONTINUED | OUTPATIENT
Start: 2024-03-22 | End: 2024-03-23 | Stop reason: HOSPADM

## 2024-03-22 RX ORDER — DULOXETIN HYDROCHLORIDE 30 MG/1
30 CAPSULE, DELAYED RELEASE ORAL DAILY
Status: DISCONTINUED | OUTPATIENT
Start: 2024-03-23 | End: 2024-03-23 | Stop reason: HOSPADM

## 2024-03-22 RX ORDER — HYDROXYZINE HYDROCHLORIDE 25 MG/1
50 TABLET, FILM COATED ORAL NIGHTLY
Status: DISCONTINUED | OUTPATIENT
Start: 2024-03-22 | End: 2024-03-23 | Stop reason: HOSPADM

## 2024-03-22 RX ORDER — METOPROLOL TARTRATE 100 MG/1
100 TABLET ORAL 2 TIMES DAILY
Status: DISCONTINUED | OUTPATIENT
Start: 2024-03-22 | End: 2024-03-23 | Stop reason: HOSPADM

## 2024-03-22 RX ORDER — SODIUM CHLORIDE 0.9 % (FLUSH) 0.9 %
5-40 SYRINGE (ML) INJECTION PRN
Status: DISCONTINUED | OUTPATIENT
Start: 2024-03-22 | End: 2024-03-23 | Stop reason: HOSPADM

## 2024-03-22 RX ORDER — PANTOPRAZOLE SODIUM 40 MG/1
40 TABLET, DELAYED RELEASE ORAL
Status: DISCONTINUED | OUTPATIENT
Start: 2024-03-23 | End: 2024-03-23 | Stop reason: HOSPADM

## 2024-03-22 RX ORDER — ONDANSETRON 2 MG/ML
4 INJECTION INTRAMUSCULAR; INTRAVENOUS EVERY 6 HOURS PRN
Status: DISCONTINUED | OUTPATIENT
Start: 2024-03-22 | End: 2024-03-23 | Stop reason: HOSPADM

## 2024-03-22 RX ORDER — ACETAMINOPHEN 325 MG/1
650 TABLET ORAL EVERY 6 HOURS PRN
Status: DISCONTINUED | OUTPATIENT
Start: 2024-03-22 | End: 2024-03-23 | Stop reason: HOSPADM

## 2024-03-22 RX ORDER — M-VIT,TX,IRON,MINS/CALC/FOLIC 27MG-0.4MG
1 TABLET ORAL DAILY
Status: DISCONTINUED | OUTPATIENT
Start: 2024-03-23 | End: 2024-03-23 | Stop reason: HOSPADM

## 2024-03-22 RX ORDER — LEVOFLOXACIN 5 MG/ML
750 INJECTION, SOLUTION INTRAVENOUS
Status: DISCONTINUED | OUTPATIENT
Start: 2024-03-22 | End: 2024-03-23 | Stop reason: HOSPADM

## 2024-03-22 RX ORDER — SODIUM CHLORIDE 9 MG/ML
INJECTION, SOLUTION INTRAVENOUS PRN
Status: DISCONTINUED | OUTPATIENT
Start: 2024-03-22 | End: 2024-03-23 | Stop reason: HOSPADM

## 2024-03-22 RX ORDER — ACETAMINOPHEN 650 MG/1
650 SUPPOSITORY RECTAL EVERY 6 HOURS PRN
Status: DISCONTINUED | OUTPATIENT
Start: 2024-03-22 | End: 2024-03-23 | Stop reason: HOSPADM

## 2024-03-22 RX ORDER — LANOLIN ALCOHOL/MO/W.PET/CERES
325 CREAM (GRAM) TOPICAL DAILY
Status: DISCONTINUED | OUTPATIENT
Start: 2024-03-23 | End: 2024-03-23 | Stop reason: HOSPADM

## 2024-03-22 RX ORDER — BUMETANIDE 1 MG/1
1 TABLET ORAL DAILY
Status: DISCONTINUED | OUTPATIENT
Start: 2024-03-23 | End: 2024-03-23 | Stop reason: HOSPADM

## 2024-03-22 RX ORDER — GUAIFENESIN 600 MG/1
600 TABLET, EXTENDED RELEASE ORAL 2 TIMES DAILY
Status: DISCONTINUED | OUTPATIENT
Start: 2024-03-22 | End: 2024-03-23 | Stop reason: HOSPADM

## 2024-03-22 RX ORDER — PRAVASTATIN SODIUM 20 MG
20 TABLET ORAL DAILY
Status: DISCONTINUED | OUTPATIENT
Start: 2024-03-22 | End: 2024-03-23 | Stop reason: HOSPADM

## 2024-03-22 RX ORDER — HEPARIN SODIUM 5000 [USP'U]/ML
5000 INJECTION, SOLUTION INTRAVENOUS; SUBCUTANEOUS EVERY 8 HOURS SCHEDULED
Status: DISCONTINUED | OUTPATIENT
Start: 2024-03-22 | End: 2024-03-23 | Stop reason: HOSPADM

## 2024-03-22 RX ORDER — SODIUM BICARBONATE 650 MG/1
1300 TABLET ORAL 3 TIMES DAILY
Status: DISCONTINUED | OUTPATIENT
Start: 2024-03-22 | End: 2024-03-23

## 2024-03-22 RX ORDER — VITAMIN B COMPLEX
2000 TABLET ORAL DAILY
Status: DISCONTINUED | OUTPATIENT
Start: 2024-03-22 | End: 2024-03-23

## 2024-03-22 RX ORDER — SODIUM CHLORIDE 0.9 % (FLUSH) 0.9 %
5-40 SYRINGE (ML) INJECTION EVERY 12 HOURS SCHEDULED
Status: DISCONTINUED | OUTPATIENT
Start: 2024-03-22 | End: 2024-03-23 | Stop reason: HOSPADM

## 2024-03-22 RX ADMIN — METOPROLOL 100 MG: 100 TABLET ORAL at 21:54

## 2024-03-22 RX ADMIN — Medication 2000 UNITS: at 21:41

## 2024-03-22 RX ADMIN — LEVOFLOXACIN 750 MG: 5 INJECTION, SOLUTION INTRAVENOUS at 21:50

## 2024-03-22 RX ADMIN — GUAIFENESIN 600 MG: 600 TABLET ORAL at 21:41

## 2024-03-22 RX ADMIN — DILTIAZEM HYDROCHLORIDE 60 MG: 60 TABLET, FILM COATED ORAL at 21:41

## 2024-03-22 RX ADMIN — AZITHROMYCIN MONOHYDRATE 500 MG: 500 INJECTION, POWDER, LYOPHILIZED, FOR SOLUTION INTRAVENOUS at 18:46

## 2024-03-22 RX ADMIN — PRAVASTATIN SODIUM 20 MG: 20 TABLET ORAL at 21:54

## 2024-03-22 RX ADMIN — WATER 1000 MG: 1 INJECTION INTRAMUSCULAR; INTRAVENOUS; SUBCUTANEOUS at 18:42

## 2024-03-22 RX ADMIN — SODIUM BICARBONATE 1300 MG: 650 TABLET ORAL at 21:54

## 2024-03-22 RX ADMIN — HYDROXYZINE HYDROCHLORIDE 50 MG: 25 TABLET, FILM COATED ORAL at 21:54

## 2024-03-22 RX ADMIN — SODIUM CHLORIDE 1000 ML: 9 INJECTION, SOLUTION INTRAVENOUS at 18:41

## 2024-03-22 RX ADMIN — HEPARIN SODIUM 5000 UNITS: 5000 INJECTION INTRAVENOUS; SUBCUTANEOUS at 21:42

## 2024-03-22 RX ADMIN — FLUTICASONE PROPIONATE 2 SPRAY: 50 SPRAY, METERED NASAL at 21:43

## 2024-03-22 RX ADMIN — SODIUM CHLORIDE, PRESERVATIVE FREE 10 ML: 5 INJECTION INTRAVENOUS at 21:51

## 2024-03-22 RX ADMIN — PANTOPRAZOLE SODIUM 40 MG: 40 INJECTION, POWDER, FOR SOLUTION INTRAVENOUS at 19:47

## 2024-03-22 ASSESSMENT — ENCOUNTER SYMPTOMS
CONSTIPATION: 0
COUGH: 1
NAUSEA: 1
VOMITING: 1
COLOR CHANGE: 0
SHORTNESS OF BREATH: 0
SORE THROAT: 0
BACK PAIN: 0
CHEST TIGHTNESS: 0
DIARRHEA: 0
VOMITING: 0
WHEEZING: 0
CHOKING: 0
ABDOMINAL DISTENTION: 0
ABDOMINAL PAIN: 0
APNEA: 0
SHORTNESS OF BREATH: 1
NAUSEA: 0

## 2024-03-22 ASSESSMENT — PAIN - FUNCTIONAL ASSESSMENT: PAIN_FUNCTIONAL_ASSESSMENT: 0-10

## 2024-03-22 ASSESSMENT — PAIN SCALES - GENERAL: PAINLEVEL_OUTOF10: 4

## 2024-03-22 NOTE — ED PROVIDER NOTES
Team [facility] ED  eMERGENCY dEPARTMENT eNCOUnter      Pt Name: Prateek Denton II  MRN: [id number]  Birthdate 1960  Date of evaluation: 3/22/2024  Provider: TORREY Salcedo CNP    CHIEF COMPLAINT       Chief Complaint   Patient presents with    Shortness of Breath    Abnormal Lab         HISTORY OF PRESENT ILLNESS  (Location/Symptom, Timing/Onset, Context/Setting, Quality, Duration, Modifying Factors, Severity.)   Prateek Denton II is a 64 y.o. male who presents to the emergency department. C/o continued cough, SOB, fatigue over the past 2 weeks. He was sent by his pcp for an elevated creatinine; it was 3.4 on his last draw yesterday. He has one functioning kidney. The patient has hx CKD with baseline creatinine of 2.4. 3/11/24 the patient was admitted for pneumonia. 2/29/24 he was diagnosed with influenza. Denies fever, chills, N/V/D, urinary sx. Denies change in urine output. Denies dizziness. Rates his pain 4/10.     Nursing Notes were reviewed.    ALLERGIES     Ampicillin, Pcn [penicillins], Sulfa antibiotics, and Tape [adhesive tape]    CURRENT MEDICATIONS       Previous Medications    ALBUTEROL SULFATE HFA (VENTOLIN HFA) 108 (90 BASE) MCG/ACT INHALER    Inhale 2 puffs into the lungs 4 times daily as needed for Wheezing or Shortness of Breath    AZELASTINE  MCG/SPRAY SOLN    2 sprays by Nasal route in the morning and at bedtime    BLOOD GLUCOSE MONITORING SUPPL ([employer] BLOOD GLUCOSE METER) W/DEVICE KIT    1 each by Does not apply route in the morning and at bedtime    BUMETANIDE (BUMEX) 1 MG TABLET    Take 1 tablet by mouth daily    CHOLECALCIFEROL (VITAMIN D) 50 MCG (2000 UT) CAPS CAPSULE    Take 2,000 Units by mouth daily    CONTINUOUS BLOOD GLUC SENSOR (FREESTYLE RANJIT 3 SENSOR) MISC    Change sensor every 14 days for continuous glucose monitoring.    CPAP MACHINE MISC    use as directed    DILTIAZEM (CARDIZEM) 60 MG TABLET    TAKE 1 TABLET BY MOUTH IN THE MORNING AND 1 TABLET  Spoke to pt, moved her appt TO 10/4/17 8:00am. Pt confirmed appt date and time. She will have labs drawn prior to her appt that morning.     Please adv annual labs.     SpO2: 95% 95% 96% 95%   Weight:             MEDICATIONS GIVEN IN THE ED:  Medications   sodium chloride 0.9 % bolus 1,000 mL (1,000 mLs IntraVENous New Bag 3/22/24 1841)   azithromycin (ZITHROMAX) 500 mg in 250 mL addavial (500 mg IntraVENous New Bag 3/22/24 1846)   cefTRIAXone (ROCEPHIN) 1,000 mg in sterile water 10 mL IV syringe (1,000 mg IntraVENous Given 3/22/24 1842)         ED ORDERS:  Orders Placed This Encounter   Procedures    Culture, Blood 2     Standing Status:   Standing     Number of Occurrences:   1    Blood Culture 1     Standing Status:   Standing     Number of Occurrences:   1    XR CHEST PORTABLE     Standing Status:   Standing     Number of Occurrences:   1     Order Specific Question:   Reason for exam:     Answer:   SOB    CT CHEST ABDOMEN PELVIS WO CONTRAST Additional Contrast? None     Standing Status:   Standing     Number of Occurrences:   1     Order Specific Question:   Reason for exam:     Answer:   cough, SOB, elevated lipase     Order Specific Question:   Additional Contrast?     Answer:   None     Order Specific Question:   Decision Support Exception - unselect if not a suspected or confirmed emergency medical condition     Answer:   Emergency Medical Condition (MA) [1]    CBC with Auto Differential     Standing Status:   Standing     Number of Occurrences:   1    BMP     Standing Status:   Standing     Number of Occurrences:   1    Hepatic Function Panel     Standing Status:   Standing     Number of Occurrences:   1    Magnesium     Standing Status:   Standing     Number of Occurrences:   1    Lipase     Standing Status:   Standing     Number of Occurrences:   1    Brain Natriuretic Peptide     Standing Status:   Standing     Number of Occurrences:   1    Troponin     Standing Status:   Standing     Number of Occurrences:   1    TROP/MYOGLOBIN     Standing Status:   Standing     Number of Occurrences:   1    Urinalysis, Chem only     Standing Status:   Standing     Number of

## 2024-03-22 NOTE — H&P
[facility]  Office: [phone]  Aung Luis DO, Mansoor Cline DO, Josse Han DO, Gregory Archibald DO, Andrew Roberts MD, Mayra Medel MD, Ray Johnson MD, Romy Amador MD,  Mic Meraz MD, Rom Hayes MD, Casadnra Washington MD,  Ely Burks DO, Dorie Anderson MD, Marcos Travis MD, Trey Luis DO, Delia Clemens MD,  Jorge Major DO, Gisel Herbert MD, Rose Yi MD, Celia Guillen MD, Matthew Hyatt MD,  Sammy Galvez MD, Mary Kay Karimi MD, Edson Hay MD, Mateo Turner MD, Stan Cline MD, Radha Hoskins MD, Dick Lam DO, Peter Isbell DO, Giles Cat MD,  Eran Mckenzie MD, Shirley Waterhouse, CNP,  Missy Chowdhury CNP, Mikael Cai, CNP,  Shantelle Monson, DNP, Betty Boles, CNP, Yany Garcia, CNP, Zulay Ramsay CNP, Cathie Law CNP, Lynda Russell, CNP, An King, PA-C, Zoraida Sherman, PA-C, Gabby Vaughn, CNP, Floridalma Cook, CNP, Jaclyn Neff, CNP, Patsy Peterson, CNS, Yareli Montiel, CNP, Martha Prince, CNP, Tracy Schwab, CNP         [facility]   IN-PATIENT SERVICE   [facility]    HISTORY AND PHYSICAL EXAMINATION            Date:   3/22/2024  Patient name:  Prateek Denton II  Date of admission:  3/22/2024  2:51 PM  MRN:   [id number]  Account:  [id number]  YOB: 1960  PCP:    Lisseth Coello APRN - CNP  Room:   [place] [address]  Code Status:    Prior    Chief Complaint:     Chief Complaint   Patient presents with    Shortness of Breath    Abnormal Lab       History Obtained From:     patient, spouse    History of Present Illness:     Prateek Denton II is a 64 y.o. Non- / non  male who presents with Shortness of Breath and Abnormal Lab   and is admitted to the hospital for the management of Fatigue due to depression.    64-year-old male past medical history of A-fib, hypertension, hyperlipidemia, CKD, recent pneumonia on Levaquin with stop date 3/25/2024, GERD, recent EGD on 3/14/2024

## 2024-03-23 VITALS
RESPIRATION RATE: 14 BRPM | WEIGHT: 234.6 LBS | HEART RATE: 136 BPM | BODY MASS INDEX: 32.84 KG/M2 | DIASTOLIC BLOOD PRESSURE: 90 MMHG | TEMPERATURE: 98.1 F | OXYGEN SATURATION: 94 % | HEIGHT: 71 IN | SYSTOLIC BLOOD PRESSURE: 140 MMHG

## 2024-03-23 LAB
ANION GAP SERPL CALCULATED.3IONS-SCNC: 11 MMOL/L (ref 9–17)
BASOPHILS # BLD: 0.03 K/UL (ref 0–0.2)
BASOPHILS NFR BLD: 0 % (ref 0–2)
BUN SERPL-MCNC: 57 MG/DL (ref 8–23)
BUN/CREAT SERPL: 19 (ref 9–20)
CALCIUM SERPL-MCNC: 8.9 MG/DL (ref 8.6–10.4)
CHLORIDE SERPL-SCNC: 102 MMOL/L (ref 98–107)
CO2 SERPL-SCNC: 24 MMOL/L (ref 20–31)
CREAT SERPL-MCNC: 3 MG/DL (ref 0.7–1.2)
EOSINOPHIL # BLD: 0.13 K/UL (ref 0–0.44)
EOSINOPHILS RELATIVE PERCENT: 1 % (ref 1–4)
ERYTHROCYTE [DISTWIDTH] IN BLOOD BY AUTOMATED COUNT: 13.2 % (ref 11.8–14.4)
GFR SERPL CREATININE-BSD FRML MDRD: 22 ML/MIN/1.73M2
GLUCOSE BLD-MCNC: 119 MG/DL (ref 75–110)
GLUCOSE BLD-MCNC: 206 MG/DL (ref 75–110)
GLUCOSE SERPL-MCNC: 129 MG/DL (ref 70–99)
HCT VFR BLD AUTO: 30.2 % (ref 40.7–50.3)
HGB BLD-MCNC: 9.6 G/DL (ref 13–17)
IMM GRANULOCYTES # BLD AUTO: 0.45 K/UL (ref 0–0.3)
IMM GRANULOCYTES NFR BLD: 5 %
LYMPHOCYTES NFR BLD: 1.98 K/UL (ref 1.1–3.7)
LYMPHOCYTES RELATIVE PERCENT: 22 % (ref 24–43)
MCH RBC QN AUTO: 29 PG (ref 25.2–33.5)
MCHC RBC AUTO-ENTMCNC: 31.8 G/DL (ref 28.4–34.8)
MCV RBC AUTO: 91.2 FL (ref 82.6–102.9)
MONOCYTES NFR BLD: 0.93 K/UL (ref 0.1–1.2)
MONOCYTES NFR BLD: 10 % (ref 3–12)
NEUTROPHILS NFR BLD: 62 % (ref 36–65)
NEUTS SEG NFR BLD: 5.68 K/UL (ref 1.5–8.1)
NRBC BLD-RTO: 0 PER 100 WBC
PLATELET # BLD AUTO: 456 K/UL (ref 138–453)
PMV BLD AUTO: 9.8 FL (ref 8.1–13.5)
POTASSIUM SERPL-SCNC: 4.2 MMOL/L (ref 3.7–5.3)
RBC # BLD AUTO: 3.31 M/UL (ref 4.21–5.77)
SODIUM SERPL-SCNC: 137 MMOL/L (ref 135–144)
WBC OTHER # BLD: 9.2 K/UL (ref 3.5–11.3)

## 2024-03-23 PROCEDURE — 2580000003 HC RX 258: Performed by: STUDENT IN AN ORGANIZED HEALTH CARE EDUCATION/TRAINING PROGRAM

## 2024-03-23 PROCEDURE — 36415 COLL VENOUS BLD VENIPUNCTURE: CPT

## 2024-03-23 PROCEDURE — 85025 COMPLETE CBC W/AUTO DIFF WBC: CPT

## 2024-03-23 PROCEDURE — 6370000000 HC RX 637 (ALT 250 FOR IP): Performed by: NURSE PRACTITIONER

## 2024-03-23 PROCEDURE — G0378 HOSPITAL OBSERVATION PER HR: HCPCS

## 2024-03-23 PROCEDURE — 99232 SBSQ HOSP IP/OBS MODERATE 35: CPT | Performed by: STUDENT IN AN ORGANIZED HEALTH CARE EDUCATION/TRAINING PROGRAM

## 2024-03-23 PROCEDURE — 97530 THERAPEUTIC ACTIVITIES: CPT

## 2024-03-23 PROCEDURE — 96372 THER/PROPH/DIAG INJ SC/IM: CPT

## 2024-03-23 PROCEDURE — 80048 BASIC METABOLIC PNL TOTAL CA: CPT

## 2024-03-23 PROCEDURE — 6370000000 HC RX 637 (ALT 250 FOR IP): Performed by: STUDENT IN AN ORGANIZED HEALTH CARE EDUCATION/TRAINING PROGRAM

## 2024-03-23 PROCEDURE — 97162 PT EVAL MOD COMPLEX 30 MIN: CPT

## 2024-03-23 PROCEDURE — 6360000002 HC RX W HCPCS: Performed by: STUDENT IN AN ORGANIZED HEALTH CARE EDUCATION/TRAINING PROGRAM

## 2024-03-23 PROCEDURE — 82947 ASSAY GLUCOSE BLOOD QUANT: CPT

## 2024-03-23 RX ORDER — SODIUM BICARBONATE 650 MG/1
650 TABLET ORAL 3 TIMES DAILY
Status: DISCONTINUED | OUTPATIENT
Start: 2024-03-23 | End: 2024-03-23 | Stop reason: HOSPADM

## 2024-03-23 RX ORDER — DULOXETIN HYDROCHLORIDE 30 MG/1
30 CAPSULE, DELAYED RELEASE ORAL DAILY
Qty: 30 CAPSULE | Refills: 0 | Status: SHIPPED | OUTPATIENT
Start: 2024-03-24 | End: 2024-04-23

## 2024-03-23 RX ORDER — BUMETANIDE 1 MG/1
1 TABLET ORAL DAILY
Qty: 30 TABLET | Refills: 0 | Status: SHIPPED | OUTPATIENT
Start: 2024-03-23

## 2024-03-23 RX ORDER — VITAMIN B COMPLEX
1000 TABLET ORAL DAILY
Status: DISCONTINUED | OUTPATIENT
Start: 2024-03-23 | End: 2024-03-23 | Stop reason: HOSPADM

## 2024-03-23 RX ORDER — VITAMIN B COMPLEX
1000 TABLET ORAL DAILY
Qty: 30 TABLET | Refills: 0 | Status: SHIPPED | OUTPATIENT
Start: 2024-03-23

## 2024-03-23 RX ORDER — SODIUM BICARBONATE 650 MG/1
650 TABLET ORAL 3 TIMES DAILY
Qty: 60 TABLET | Refills: 0 | Status: SHIPPED | OUTPATIENT
Start: 2024-03-23

## 2024-03-23 RX ADMIN — DULOXETINE HYDROCHLORIDE 30 MG: 30 CAPSULE, DELAYED RELEASE ORAL at 08:52

## 2024-03-23 RX ADMIN — SODIUM BICARBONATE 1300 MG: 650 TABLET ORAL at 13:27

## 2024-03-23 RX ADMIN — SODIUM BICARBONATE 1300 MG: 650 TABLET ORAL at 08:52

## 2024-03-23 RX ADMIN — Medication 1 TABLET: at 09:06

## 2024-03-23 RX ADMIN — LOSARTAN POTASSIUM 25 MG: 25 TABLET, FILM COATED ORAL at 08:53

## 2024-03-23 RX ADMIN — FERROUS SULFATE TAB EC 325 MG (65 MG FE EQUIVALENT) 325 MG: 325 (65 FE) TABLET DELAYED RESPONSE at 08:53

## 2024-03-23 RX ADMIN — GUAIFENESIN 600 MG: 600 TABLET ORAL at 08:52

## 2024-03-23 RX ADMIN — BUMETANIDE 1 MG: 1 TABLET ORAL at 08:52

## 2024-03-23 RX ADMIN — PANTOPRAZOLE SODIUM 40 MG: 40 TABLET, DELAYED RELEASE ORAL at 16:36

## 2024-03-23 RX ADMIN — METOPROLOL 100 MG: 100 TABLET ORAL at 08:52

## 2024-03-23 RX ADMIN — PANTOPRAZOLE SODIUM 40 MG: 40 TABLET, DELAYED RELEASE ORAL at 06:16

## 2024-03-23 RX ADMIN — ACETAMINOPHEN 650 MG: 325 TABLET ORAL at 13:27

## 2024-03-23 RX ADMIN — DILTIAZEM HYDROCHLORIDE 60 MG: 60 TABLET, FILM COATED ORAL at 08:52

## 2024-03-23 RX ADMIN — HEPARIN SODIUM 5000 UNITS: 5000 INJECTION INTRAVENOUS; SUBCUTANEOUS at 06:16

## 2024-03-23 RX ADMIN — HEPARIN SODIUM 5000 UNITS: 5000 INJECTION INTRAVENOUS; SUBCUTANEOUS at 13:27

## 2024-03-23 RX ADMIN — SODIUM CHLORIDE, PRESERVATIVE FREE 10 ML: 5 INJECTION INTRAVENOUS at 08:52

## 2024-03-23 RX ADMIN — TRAZODONE HYDROCHLORIDE 50 MG: 50 TABLET ORAL at 00:12

## 2024-03-23 RX ADMIN — FLUTICASONE PROPIONATE 2 SPRAY: 50 SPRAY, METERED NASAL at 08:53

## 2024-03-23 ASSESSMENT — ENCOUNTER SYMPTOMS
BACK PAIN: 0
ABDOMINAL DISTENTION: 0
SHORTNESS OF BREATH: 0
ABDOMINAL PAIN: 0
CHEST TIGHTNESS: 0
COUGH: 0
NAUSEA: 0
WHEEZING: 0
CHOKING: 0
VOMITING: 0
APNEA: 0
CONSTIPATION: 0

## 2024-03-23 ASSESSMENT — PAIN SCALES - GENERAL: PAINLEVEL_OUTOF10: 3

## 2024-03-23 NOTE — CONSULTS
[facility]    Consult Note    Reason for Consult:  DEBBI on CKD    Requesting Physician:  Casandra Washington MD    HISTORY OF PRESENT ILLNESS:    Consulted for DEBBI and CKD.  Pt seen and examined at the bedside.  He was just discharged about a week ago and readmitted for pneumonia.   Pt was being seen by our service while in the hospital last week. Notes reviewed.  Pt has underlying CKD stage 3B, but has not followed with nephrology outpatient.  Pt is taking Bumex daily.  Elytes are WNL.  Pt has been sick over the past few weeks and reportedly has been vomiting.  His creatinine is elevated at 3.0, eGFR is 22, both of which have improved since yesterday.  BP and volume status are stable.  Per hospitalist, plan is for pt to be discharged today after he is seen by Dr. Barnes.    Review of Systems:     Constitutional: No fever, chills, lethargy, weakness, wt loss; +fatigue  HEENT:  No headache, nasal discharge, sore throat.  Cardiac:  No chest pain, dyspnea, orthopnea, PND.  Chest:              +Cough, phlegm; no wheezing.  Abdomen:  Abdominal pain, nausea, vomiting, diarrhea -resolved  Neuro:  No gross or focal weakness, numbness, abnormal movements, seizure  Skin:   No rashes, itching.  :   No hematuria, pyuria, dysuria, flank pain.  Extremities:  No swelling or joint pains.  Endocrine: No polyuria, polydypsia, thyroid problems.  Hematology:    No bleeding disorders, bruising, anemia.    All other ROS is negative.       Past Medical History:     Past Medical History:   Diagnosis Date    Acute blood loss anemia 01/05/2022    Arthritis     BILATERAL KNEESand right shoulder    Back pain     Cellulitis 07/14/2015    Colostomy RUQ 09/23/2020    Frequent headaches     10/28/2020 PATIENT STATES EVERY OTHER DAY.    Gastrointestinal hemorrhage with melena 01/05/2022    Gout     Hemorrhagic shock (HCC) 06/21/2023    Hernia of abdominal wall     History of atrial fibrillation  ondansetron, magnesium hydroxide, acetaminophen **OR** acetaminophen    Allergies:     Allergies   Allergen Reactions    Ampicillin Swelling     Swelling of throat.     Pcn [Penicillins] Swelling     Throat swelling.  Tolerated cefepime during 8/31/20 admission.    Sulfa Antibiotics      Other reaction(s): Unknown    Tape [Adhesive Tape] Other (See Comments)     CAUSES REDNESS. PAPER TAPE OK.       Social History:     Social History     Socioeconomic History    Marital status:      Spouse name: Not on file    Number of children: Not on file    Years of education: Not on file    Highest education level: Not on file   Occupational History    Not on file   Tobacco Use    Smoking status: Never    Smokeless tobacco: Former     Types: Chew     Quit date: 1980   Vaping Use    Vaping Use: Never used   Substance and Sexual Activity    Alcohol use: Yes     Alcohol/week: 2.0 standard drinks of alcohol     Types: 2 Cans of beer per week     Comment: rarely    Drug use: Yes     Types: Marijuana (Weed)     Comment: edibles    Sexual activity: Not Currently   Other Topics Concern    Not on file   Social History Narrative    Not on file     Social Determinants of Health     Financial Resource Strain: Low Risk  (5/10/2023)    Overall Financial Resource Strain (CARDIA)     Difficulty of Paying Living Expenses: Not hard at all   Food Insecurity: No Food Insecurity (3/22/2024)    Hunger Vital Sign     Worried About Running Out of Food in the Last Year: Never true     Ran Out of Food in the Last Year: Never true   Transportation Needs: No Transportation Needs (3/22/2024)    PRAPARE - Transportation     Lack of Transportation (Medical): No     Lack of Transportation (Non-Medical): No   Physical Activity: Insufficiently Active (5/10/2023)    Exercise Vital Sign     Days of Exercise per Week: 4 days     Minutes of Exercise per Session: 10 min   Stress: Not on file   Social Connections: Not on file   Intimate Partner Violence: Not on

## 2024-03-23 NOTE — CARE COORDINATION
03/23/24 0900   Readmission Assessment   Number of Days since last admission? 1-7 days   Previous Disposition Home with Family   Who is being Interviewed Patient   What was the patient's/caregiver's perception as to why they think they needed to return back to the hospital? Did not realize care needs would be so extensive   Did you visit your Primary Care Physician after you left the hospital, before you returned this time? No   Why weren't you able to visit your PCP? Did not have an appointment   Did you see a specialist, such as Cardiac, Pulmonary, Orthopedic Physician, etc. after you left the hospital? No   Who advised the patient to return to the hospital? Self-referral   Does the patient report anything that got in the way of taking their medications? No   In our efforts to provide the best possible care to you and others like you, can you think of anything that we could have done to help you after you left the hospital the first time, so that you might not have needed to return so soon? Arrange for more help when leaving the hospital       
Discharge planning    Patient has discharge order. [facility] to [facility] to [facility] up on referral. They can accept. Denilson RHODES aware. They will pull his CRISTOFER out of epic   
goals of Elevated serum creatinine [R79.89]  Fatigue due to depression [F32.A, R53.83]  Dyspnea, unspecified type [R06.00]  Pneumonia due to infectious organism, unspecified laterality, unspecified part of lung [J18.9]    IF APPLICABLE: The Patient and/or patient representative Prateek and his family were provided with a choice of provider and agrees with the discharge plan. Freedom of choice list with basic dialogue that supports the patient's individualized plan of care/goals and shares the quality data associated with the providers was provided to: Patient   Patient Representative Name:       The Patient and/or Patient Representative Agree with the Discharge Plan? Yes    GIACOMO SMITH RN  Case Management Department

## 2024-03-23 NOTE — ED PROVIDER NOTES
eMERGENCY dEPARTMENT eNCOUnter   Independent Attestation     Pt Name: Prateek Denton II  MRN: [id number]  Birthdate 1960  Date of evaluation: 3/22/24     Prateek Denton II is a 64 y.o. male with CC: Shortness of Breath and Abnormal Lab      Based on the medical record the care appears appropriate.  I was personally available for consultation in the Emergency Department.    David Lutz MD  Attending Emergency Physician                  David Lutz MD  03/22/24 [id number]

## 2024-03-23 NOTE — DISCHARGE INSTR - COC
Continuity of Care Form    Patient Name: Prateek Denton II   :  1960  MRN:  [id number]    Admit date:  3/22/2024  Discharge date:  2024    Code Status Order: Full Code   Advance Directives:     Admitting Physician:  Casandra Washington MD  PCP: Lisseth Coello, APRN - CNP    Discharging Nurse: Trey Tucker RN    Discharging Hospital Unit/Room#:   Discharging Unit Phone Number: [phone]    Emergency Contact:   Extended Emergency Contact Information  Primary Emergency Contact: Valencia Denton  Address: [address] [place] [address]  Home Phone: [phone]  Work Phone: [phone]  Mobile Phone: [phone]  Relation: Spouse    Past Surgical History:  Past Surgical History:   Procedure Laterality Date    ADENOIDECTOMY      TWICE AS A CHILD    ANKLE SURGERY Right     RUPTURED ACHILES    CARPAL TUNNEL RELEASE Bilateral     COLONOSCOPY  2023    diagnostic    COLONOSCOPY N/A 2023    COLONOSCOPY DIAGNOSTIC performed by Cosme Hunter MD at [facility] OR    COLOSTOMY      temporary    CYSTO/URETERO/PYELOSCOPY, CALCULUS TX Right 10/30/2020    HOLMIUM-STANDBY, CYSTOSCOPY, URETEROSCOPY, STENT EXCHANGE performed by Donal Cano MD at [facility] OR    CYSTOSCOPY Right 2020    CYSTOSCOPY RIGHT URETERAL STENT INSERTION performed by Jeff Johnson MD at [facility] OR    HC CATH POWER PICC TRIPLE  2020    REMOVED AROUND 10/07/2020    KIDNEY SURGERY Left 2020    LAPAROSCOPIC XI ROBOTIC NEPHRECTOMY performed by Brandyn Garcia MD at [facility] OR    KNEE ARTHROSCOPY Bilateral     LAPAROTOMY N/A 2020    LAPAROTOMY EXPLORATORY performed by Jagjit Doty DO at [facility] OR    SMALL INTESTINE SURGERY N/A 2020    EXPLORATORY LAPAROTOMY, RESECTION OF PROXIMAL JEJUNUM AND DESCENDING COLON WITH MOBILIZATION OF SPLENIC FLEXURE AND PRIMARY ANASTAMOSISOF SMALL BOWEL AND COLON, PLACEMENT OF GASTROSTOMY TUBE performed by Jagjit Doty DO at [facility] OR    SMALL INTESTINE SURGERY  Chronic diarrhea K52.9    Dieulafoy lesion (hemorrhagic) of stomach and duodenum K31.82    Esophagitis determined by endoscopy K20.90    Dark stools R19.5    Hyponatremia E87.1    Class 1 obesity with serious comorbidity and body mass index (BMI) of 33.0 to 33.9 in adult E66.9, Z68.33    Fatigue due to depression F32.A, R53.83       Isolation/Infection:   Isolation            No Isolation          Patient Infection Status       Infection Onset Added Last Indicated Last Indicated By Review Planned Expiration Resolved Resolved By    None active    Resolved    Influenza 24 COVID-19 & Influenza Combo   24 Kelsey Graham RN    Originally positive 2024    Influenza 24 Respiratory Panel, Molecular, with COVID-19 (Restricted: peds pts or suitable admitted adults)   03/10/24 Infection     COVID-19 21 COVID-19   21 Infection                        Nurse Assessment:  Last Vital Signs: BP (!) 140/90   Pulse (!) 136   Temp 98.1 °F (36.7 °C) (Oral)   Resp 14   Ht 1.803 m (5' 11\")   Wt 106.4 kg (234 lb 9.6 oz)   SpO2 94% Comment: walking  BMI 32.72 kg/m²     Last documented pain score (0-10 scale): Pain Level: 4  Last Weight:   Wt Readings from Last 1 Encounters:   24 106.4 kg (234 lb 9.6 oz)     Mental Status:  oriented and alert    IV Access:  - None    Nursing Mobility/ADLs:  Walking   Assisted  Transfer  Independent  Bathing  Independent  Dressing  Independent  Toileting  Independent  Feeding  Independent  Med Admin  Independent  Med Delivery   whole    Wound Care Documentation and Therapy:        Elimination:  Continence:   Bowel: Yes  Bladder: Yes  Urinary Catheter: None   Colostomy/Ileostomy/Ileal Conduit: No       Date of Last BM: 03/15/2023    Intake/Output Summary (Last 24 hours) at 3/23/2024 1036  Last data filed at 3/23/2024 1026  Gross per 24 hour   Intake 1050.53 ml   Output 1175 ml   Net -124.47 ml

## 2024-03-23 NOTE — PLAN OF CARE
Problem: Discharge Planning  Goal: Discharge to home or other facility with appropriate resources  3/23/2024 0957 by Trey Tucker, RN  Outcome: Progressing  Flowsheets (Taken 3/23/2024 0957)  Discharge to home or other facility with appropriate resources: Identify barriers to discharge with patient and caregiver     Problem: Pain  Goal: Verbalizes/displays adequate comfort level or baseline comfort level  3/23/2024 0957 by Trey Tucker, RN  Outcome: Progressing  Flowsheets (Taken 3/23/2024 0957)  Verbalizes/displays adequate comfort level or baseline comfort level: Encourage patient to monitor pain and request assistance     Problem: Chronic Conditions and Co-morbidities  Goal: Patient's chronic conditions and co-morbidity symptoms are monitored and maintained or improved  Outcome: Progressing  Flowsheets (Taken 3/23/2024 0957)  Care Plan - Patient's Chronic Conditions and Co-Morbidity Symptoms are Monitored and Maintained or Improved: Monitor and assess patient's chronic conditions and comorbid symptoms for stability, deterioration, or improvement     Problem: Safety - Adult  Goal: Free from fall injury  Outcome: Progressing  Flowsheets (Taken 3/23/2024 0957)  Free From Fall Injury: Instruct family/caregiver on patient safety

## 2024-03-23 NOTE — PLAN OF CARE
Problem: Discharge Planning  Goal: Discharge to home or other facility with appropriate resources  Outcome: Progressing  Flowsheets (Taken 3/22/2024 2117)  Discharge to home or other facility with appropriate resources: Identify barriers to discharge with patient and caregiver     Problem: Pain  Goal: Verbalizes/displays adequate comfort level or baseline comfort level  Outcome: Progressing

## 2024-03-23 NOTE — RT PROTOCOL NOTE
RT Inhaler-Nebulizer Bronchodilator Protocol Note    There is a bronchodilator order in the chart from a provider indicating to follow the RT Bronchodilator Protocol and there is an “Initiate RT Inhaler-Nebulizer Bronchodilator Protocol” order as well (see protocol at bottom of note).    CXR Findings:  XR CHEST PORTABLE    Result Date: 3/22/2024  Persistent dense consolidation in the right lower lobe suggesting pneumonia       The findings from the last RT Protocol Assessment were as follows:   History Pulmonary Disease: None or smoker <15 pack years  Respiratory Pattern: Regular pattern and RR 12-20 bpm  Breath Sounds: Slightly diminished and/or crackles  Cough: Strong, spontaneous, non-productive  Indication for Bronchodilator Therapy: Decreased or absent breath sounds, On home bronchodilators  Bronchodilator Assessment Score: 2    Aerosolized bronchodilator medication orders have been revised according to the RT Inhaler-Nebulizer Bronchodilator Protocol below.    Respiratory Therapist to perform RT Therapy Protocol Assessment initially then follow the protocol.  Repeat RT Therapy Protocol Assessment PRN for score 0-3 or on second treatment, BID, and PRN for scores above 3.    No Indications - adjust the frequency to every 6 hours PRN wheezing or bronchospasm, if no treatments needed after 48 hours then discontinue using Per Protocol order mode.     If indication present, adjust the RT bronchodilator orders based on the Bronchodilator Assessment Score as indicated below.  Use Inhaler orders unless patient has one or more of the following: on home nebulizer, not able to hold breath for 10 seconds, is not alert and oriented, cannot activate and use MDI correctly, or respiratory rate 25 breaths per minute or more, then use the equivalent nebulizer order(s) with same Frequency and PRN reasons based on the score.  If a patient is on this medication at home then do not decrease Frequency below that used at home.    0-3

## 2024-03-23 NOTE — PROGRESS NOTES
Adm to room 2004; instructed on call light; bed controls; phone; medications; safety; hospital routine/ rounding

## 2024-03-23 NOTE — PROGRESS NOTES
[id number] 96.5 02/29/2024 09:48 AM    [id number] 99.3 09/23/2020 03:06 AM    FIO2 28.0 02/29/2024 09:48 AM     Lab Results   Component Value Date/Time    SPECIAL RT AC, 10 ML 03/22/2024 06:40 PM     Lab Results   Component Value Date/Time    CULTURE NO GROWTH <24 HRS 03/22/2024 06:40 PM       Radiology:  CT CHEST ABDOMEN PELVIS WO CONTRAST Additional Contrast? None    Result Date: 3/22/2024  1.  Right middle lobe and right lower lobe pneumonia appears slightly improved since the CT from March 11, 2024. 2.  No evidence of pancreatitis.  No acute findings in the abdomen or pelvis.     XR CHEST PORTABLE    Result Date: 3/22/2024  Persistent dense consolidation in the right lower lobe suggesting pneumonia     XR CHEST (2 VW)    Result Date: 3/22/2024  Slight improvement in aeration of the right lower lung zone.  Overall, persistent moderate residual right lower lobe airspace disease/pneumonia. Continued follow-up recommended to document resolution.       Physical Examination:        Physical Exam  Constitutional:       Appearance: He is obese. He is ill-appearing.   HENT:      Head: Normocephalic.      Right Ear: External ear normal.      Left Ear: External ear normal.      Nose: Nose normal.      Mouth/Throat:      Mouth: Mucous membranes are moist.   Eyes:      Pupils: Pupils are equal, round, and reactive to light.   Cardiovascular:      Rate and Rhythm: Normal rate and regular rhythm.      Heart sounds: Murmur heard.   Pulmonary:      Breath sounds: No wheezing or rales.   Abdominal:      General: There is no distension.      Palpations: Abdomen is soft.      Tenderness: There is no abdominal tenderness.   Musculoskeletal:      Cervical back: Neck supple.      Right lower leg: No edema.      Left lower leg: No edema.   Skin:     General: Skin is warm and dry.      Capillary Refill: Capillary refill takes 2 to 3 seconds.      Coloration: Skin is pale.   Neurological:      Mental Status: He is alert and oriented to

## 2024-03-23 NOTE — DISCHARGE SUMMARY
[facility]  Office: [phone]  Aung Luis DO, Mansoor Cline DO, Josse Han DO, Gregory Archibald DO, Andrew Roberts MD, Mayra Medel MD, Ray Johnson MD, Romy Amador MD,  Mic Meraz MD, Rom Hayes MD, Casandra Washington MD,  Ely Burks DO, Dorie Anderson MD, Marcos Travis MD, Trey Luis DO, Delia Clemens MD,  Jorge Major DO, Gisel Herbert MD, Rose Yi MD, Celia Guillen MD, Matthew Hyatt MD,  Sammy Galvez MD, Mary Kay Karimi MD, Edson Hay MD, Mateo Turner MD, Stan Cline MD, Radha Hoskins MD, Dick Lam DO, Peter Isbell DO, Giles Cat MD,  Eran Mckenzie MD, Shirley Waterhouse, CNP,  Missy Chowdhury CNP, Mikael Cai, CNP,  Shantelle Monson, DNP, Betty Boles, CNP, Yany Garcia, CNP, Zulay Ramsay CNP, Cathie Law CNP, Lynda Russell, CNP, An King, PA-C, Zoraida Sherman, PA-C, Gabby Vaughn, CNP, Floridalma Cook, CNP, Jaclyn Neff, CNP, Patsy Peterson, CNS, Yareli Montiel, CNP, Martha Prince, CNP, Tracy Schwab, CNP         [facility]   IN-PATIENT SERVICE   [facility]    Discharge Summary     Patient ID: Prateek Denton II  :  1960   MRN: [id number]     ACCOUNT:  [id number]   Patient's PCP: Lisseth Coello APRN - CNP  Admit Date: 3/22/2024   Discharge Date: 3/23/2024     Length of Stay: 0  Code Status:  Full Code  Admitting Physician: Casandra Washington MD  Discharge Physician: Casandra Washington MD     Active Discharge Diagnoses:     Hospital Problem Lists:  Principal Problem:    Fatigue due to depression  Active Problems:    Essential hypertension    CKD (chronic kidney disease) stage 4, GFR 15-29 ml/min (HCC)    Type 2 diabetes mellitus with diabetic nephropathy, with long-term current use of insulin (HCC)    Acute kidney injury superimposed on CKD (HCC)    Tachycardia    Paroxysmal A-fib (HCC)    Pneumonia of right lower lobe due to infectious organism    Class 1 obesity with

## 2024-03-23 NOTE — PROGRESS NOTES
Physical Therapy  Facility/Department: [facility]  Physical Therapy Initial Assessment    Name: Prateek Denton II  : 1960  MRN: [id number]  Date of Service: 3/23/2024    Discharge Recommendations:  Patient would benefit from continued therapy after discharge. Due to recent hospitalization and medical condition, pt would benefit from additional intermittent skilled therapy at time of discharge.  Please refer to the AM-PAC score for current functional status.        HPI (per chart)64-year-old male past medical history of A-fib, hypertension, hyperlipidemia, CKD, recent pneumonia on Levaquin with stop date 3/25/2024, GERD, recent EGD on 3/14/2024 With pathology showing unremarkable duodenal mucosa, mildly chronic inactive gastritis, benign mucosal gastric polyp presents at the request of his PCP due to abnormal labs.  Per patient and spouse energy has been slowly improving cough has also improved and on regular visit as outpatient was noted to have elevated leukocytosis that appears to have been resolving upon checking in the ER.  Of note yesterday patient ate breakfast of eggs and roman and became extremely nauseous and was throwing up several times prior to labs being drawn. :       Patient Diagnosis(es): The primary encounter diagnosis was Pneumonia due to infectious organism, unspecified laterality, unspecified part of lung. Diagnoses of Dyspnea, unspecified type, Elevated serum creatinine, CKD (chronic kidney disease) stage 4, GFR 15-29 ml/min (HCC), Tachycardia, Paroxysmal A-fib (HCC), and Abnormal TSH were also pertinent to this visit.  Past Medical History:  has a past medical history of Acute blood loss anemia, Arthritis, Back pain, Cellulitis, Colostomy RUQ, Frequent headaches, Gastrointestinal hemorrhage with melena, Gout, Hemorrhagic shock (HCC), Hernia of abdominal wall, History of atrial fibrillation, History of blood transfusion, [place] (hard of hearing), HTN, Hyperkalemia, Hyperlipidemia,  household distances the month)  Transfer Assistance: Independent  Active : Yes  Occupation: Full time employment  Type of Occupation: [employer]   Additional Comments: This is patient's 3rd admission in a month (Feb 26th): flu, pneumonia  Vision/Hearing  Vision  Vision: Impaired  Vision Exceptions: Wears glasses for reading  Hearing  Hearing: Within functional limits    Cognition   Orientation  Overall Orientation Status: Within Normal Limits  Cognition  Overall Cognitive Status: WNL     Objective   Supine:   Supine to sit:   Ambulating:      Observation/Palpation  Posture: Fair (rounded shoulders, forward head)  Observation: Sensation: decreased in feet,        AROM RLE (degrees)  RLE AROM: WNL  AROM LLE (degrees)  LLE AROM : WNL  AROM RUE (degrees)  RUE AROM : WNL  AROM LUE (degrees)  LUE AROM : WNL  Strength RLE  Strength RLE: WFL  Strength LLE  Strength LLE: WFL  Strength RUE  Strength RUE: WFL  Strength LUE  Strength LUE: WFL           Bed mobility  Rolling to Left: Independent  Rolling to Right: Independent  Supine to Sit: Independent  Sit to Supine: Independent  Scooting: Independent  Transfers  Sit to Stand: Independent  Stand to Sit: Independent  Bed to Chair: Independent  Stand Pivot Transfers: Independent  Ambulation  Surface: Level tile  Device: No Device  Assistance: Supervision  Quality of Gait: Patient steady with amb, but with mild increased NEEL and decreased gait speed. Patient safe with in room ambulation, but recommend supervision to wlak in halls due to incrased HR.  Gait Deviations: Decreased head and trunk rotation  Distance: 90 feet  Comments:  with ambulation     Balance  Sitting - Static: Good  Sitting - Dynamic: Good  Standing - Static: Good;-  Standing - Dynamic: Good;-           OutComes Score    AM-PAC - Mobility    AM-PAC Basic Mobility - Inpatient   How much help is needed turning from your back to your side while in a flat bed without using

## 2024-03-25 DIAGNOSIS — J40 BRONCHITIS: ICD-10-CM

## 2024-03-25 DIAGNOSIS — J98.01 BRONCHOSPASM: ICD-10-CM

## 2024-03-25 DIAGNOSIS — R06.02 SOB (SHORTNESS OF BREATH): ICD-10-CM

## 2024-03-25 RX ORDER — ALBUTEROL SULFATE 90 UG/1
2 AEROSOL, METERED RESPIRATORY (INHALATION) 4 TIMES DAILY PRN
Qty: 6.7 EACH | Refills: 0 | Status: SHIPPED | OUTPATIENT
Start: 2024-03-25

## 2024-03-26 ENCOUNTER — TELEPHONE (OUTPATIENT)
Dept: FAMILY MEDICINE CLINIC | Age: 64
End: 2024-03-26

## 2024-03-26 NOTE — TELEPHONE ENCOUNTER
Care Transitions Initial Follow Up Call    Outreach made within 2 business days of discharge: Yes    Patient: Prateek Denton II Patient : 1960   MRN: [id number]  Reason for Admission: There are no discharge diagnoses documented for the most recent discharge.  Discharge Date: 3/23/24       Spoke with: SIMONE     Discharge department/facility: [facility]      Scheduled appointment with PCP within 7-14 days    Follow Up  Future Appointments   Date Time Provider Department Center   6/3/2024  1:30 PM Lisseth Coello, APRN - CNP [facility]       Jane Chiu MA

## 2024-03-27 LAB — VIT B1 PYROPHOSHATE BLD-SCNC: 149 NMOL/L (ref 70–180)

## 2024-03-28 ENCOUNTER — HOSPITAL ENCOUNTER (OUTPATIENT)
Age: 64
Setting detail: SPECIMEN
Discharge: HOME | End: 2024-03-28
Payer: COMMERCIAL

## 2024-03-28 LAB
ANION GAP SERPL CALCULATED.3IONS-SCNC: 13 MMOL/L (ref 9–17)
BASOPHILS # BLD: 0.05 K/UL (ref 0–0.2)
BASOPHILS NFR BLD: 1 % (ref 0–2)
BUN SERPL-MCNC: 65 MG/DL (ref 8–23)
BUN/CREAT SERPL: 17 (ref 9–20)
CALCIUM SERPL-MCNC: 9.3 MG/DL (ref 8.6–10.4)
CHLORIDE SERPL-SCNC: 103 MMOL/L (ref 98–107)
CO2 SERPL-SCNC: 22 MMOL/L (ref 20–31)
CREAT SERPL-MCNC: 3.8 MG/DL (ref 0.7–1.2)
EOSINOPHIL # BLD: 0.14 K/UL (ref 0–0.44)
EOSINOPHILS RELATIVE PERCENT: 1 % (ref 1–4)
ERYTHROCYTE [DISTWIDTH] IN BLOOD BY AUTOMATED COUNT: 13.8 % (ref 11.8–14.4)
GFR SERPL CREATININE-BSD FRML MDRD: 17 ML/MIN/1.73M2
GLUCOSE SERPL-MCNC: 127 MG/DL (ref 70–99)
HCT VFR BLD AUTO: 33.2 % (ref 40.7–50.3)
HGB BLD-MCNC: 10.7 G/DL (ref 13–17)
IMM GRANULOCYTES # BLD AUTO: 0.11 K/UL (ref 0–0.3)
IMM GRANULOCYTES NFR BLD: 1 %
LYMPHOCYTES NFR BLD: 2 K/UL (ref 1.1–3.7)
LYMPHOCYTES RELATIVE PERCENT: 20 % (ref 24–43)
MCH RBC QN AUTO: 29.5 PG (ref 25.2–33.5)
MCHC RBC AUTO-ENTMCNC: 32.2 G/DL (ref 28.4–34.8)
MCV RBC AUTO: 91.5 FL (ref 82.6–102.9)
MICROORGANISM SPEC CULT: NORMAL
MICROORGANISM SPEC CULT: NORMAL
MONOCYTES NFR BLD: 0.79 K/UL (ref 0.1–1.2)
MONOCYTES NFR BLD: 8 % (ref 3–12)
NEUTROPHILS NFR BLD: 69 % (ref 36–65)
NEUTS SEG NFR BLD: 6.73 K/UL (ref 1.5–8.1)
NRBC BLD-RTO: 0 PER 100 WBC
PLATELET # BLD AUTO: 352 K/UL (ref 138–453)
PMV BLD AUTO: 11 FL (ref 8.1–13.5)
POTASSIUM SERPL-SCNC: 4.3 MMOL/L (ref 3.7–5.3)
RBC # BLD AUTO: 3.63 M/UL (ref 4.21–5.77)
SERVICE CMNT-IMP: NORMAL
SERVICE CMNT-IMP: NORMAL
SODIUM SERPL-SCNC: 138 MMOL/L (ref 135–144)
SPECIMEN DESCRIPTION: NORMAL
SPECIMEN DESCRIPTION: NORMAL
WBC OTHER # BLD: 9.8 K/UL (ref 3.5–11.3)

## 2024-03-28 PROCEDURE — 85025 COMPLETE CBC W/AUTO DIFF WBC: CPT

## 2024-03-28 PROCEDURE — 80048 BASIC METABOLIC PNL TOTAL CA: CPT

## 2024-03-30 PROBLEM — J10.1 INFLUENZA A: Status: RESOLVED | Noted: 2024-02-29 | Resolved: 2024-03-30

## 2024-04-03 ENCOUNTER — OFFICE VISIT (OUTPATIENT)
Dept: FAMILY MEDICINE CLINIC | Age: 64
End: 2024-04-03
Payer: COMMERCIAL

## 2024-04-03 VITALS
TEMPERATURE: 97.9 F | SYSTOLIC BLOOD PRESSURE: 114 MMHG | HEART RATE: 69 BPM | OXYGEN SATURATION: 93 % | WEIGHT: 242 LBS | DIASTOLIC BLOOD PRESSURE: 70 MMHG | BODY MASS INDEX: 33.88 KG/M2 | HEIGHT: 71 IN

## 2024-04-03 DIAGNOSIS — F51.02 ADJUSTMENT INSOMNIA: ICD-10-CM

## 2024-04-03 DIAGNOSIS — J18.9 PNEUMONIA OF RIGHT LOWER LOBE DUE TO INFECTIOUS ORGANISM: Primary | ICD-10-CM

## 2024-04-03 DIAGNOSIS — G47.00 INSOMNIA, UNSPECIFIED TYPE: ICD-10-CM

## 2024-04-03 PROCEDURE — 1123F ACP DISCUSS/DSCN MKR DOCD: CPT | Performed by: NURSE PRACTITIONER

## 2024-04-03 PROCEDURE — 1111F DSCHRG MED/CURRENT MED MERGE: CPT | Performed by: NURSE PRACTITIONER

## 2024-04-03 PROCEDURE — G8427 DOCREV CUR MEDS BY ELIG CLIN: HCPCS | Performed by: NURSE PRACTITIONER

## 2024-04-03 PROCEDURE — 3074F SYST BP LT 130 MM HG: CPT | Performed by: NURSE PRACTITIONER

## 2024-04-03 PROCEDURE — 1036F TOBACCO NON-USER: CPT | Performed by: NURSE PRACTITIONER

## 2024-04-03 PROCEDURE — 3017F COLORECTAL CA SCREEN DOC REV: CPT | Performed by: NURSE PRACTITIONER

## 2024-04-03 PROCEDURE — 99214 OFFICE O/P EST MOD 30 MIN: CPT | Performed by: NURSE PRACTITIONER

## 2024-04-03 PROCEDURE — G8417 CALC BMI ABV UP PARAM F/U: HCPCS | Performed by: NURSE PRACTITIONER

## 2024-04-03 PROCEDURE — 3078F DIAST BP <80 MM HG: CPT | Performed by: NURSE PRACTITIONER

## 2024-04-03 RX ORDER — TRAZODONE HYDROCHLORIDE 50 MG/1
50 TABLET ORAL NIGHTLY
Qty: 90 TABLET | Refills: 1 | Status: SHIPPED | OUTPATIENT
Start: 2024-04-03 | End: 2024-07-02

## 2024-04-03 ASSESSMENT — ENCOUNTER SYMPTOMS
DIARRHEA: 0
CONSTIPATION: 0
BLOOD IN STOOL: 0
WHEEZING: 0
VOMITING: 0
ABDOMINAL PAIN: 0
NAUSEA: 0
CHEST TIGHTNESS: 0
SHORTNESS OF BREATH: 0

## 2024-04-03 NOTE — PROGRESS NOTES
(ACWY) vaccine  Aged Out    Pneumococcal 0-64 years Vaccine  Discontinued    HIV screen  Discontinued    Prostate Specific Antigen (PSA) Screening or Monitoring  Discontinued             
APRN - CNP,CNP on 4/3/2024 at 2:30 PM

## 2024-04-05 ENCOUNTER — HOSPITAL ENCOUNTER (OUTPATIENT)
Age: 64
Setting detail: SPECIMEN
Discharge: HOME | End: 2024-04-05

## 2024-04-05 LAB
ANION GAP SERPL CALCULATED.3IONS-SCNC: 13 MMOL/L (ref 9–16)
BUN SERPL-MCNC: 53 MG/DL (ref 8–23)
CALCIUM SERPL-MCNC: 9.2 MG/DL (ref 8.6–10.4)
CHLORIDE SERPL-SCNC: 101 MMOL/L (ref 98–107)
CO2 SERPL-SCNC: 23 MMOL/L (ref 20–31)
CREAT SERPL-MCNC: 3 MG/DL (ref 0.7–1.2)
GFR SERPL CREATININE-BSD FRML MDRD: 22 ML/MIN/1.73M2
GLUCOSE SERPL-MCNC: 134 MG/DL (ref 74–99)
POTASSIUM SERPL-SCNC: 4.4 MMOL/L (ref 3.7–5.3)
SODIUM SERPL-SCNC: 137 MMOL/L (ref 136–145)

## 2024-04-26 RX ORDER — SODIUM CHLORIDE, SODIUM LACTATE, POTASSIUM CHLORIDE, CALCIUM CHLORIDE 600; 310; 30; 20 MG/100ML; MG/100ML; MG/100ML; MG/100ML
INJECTION, SOLUTION INTRAVENOUS CONTINUOUS
OUTPATIENT
Start: 2024-04-26

## 2024-04-26 NOTE — DISCHARGE INSTRUCTIONS
dose of the medications and how often you take it.  If more convenient bring the pharmacy bottles in a zip lock bag.      Please shower the night before and the morning of surgery with an antibacterial soap.  Please use the wipes given to you the night before your surgery after your shower.  Unless otherwise told by your physician, please do not shave legs or any part of your body below your neck the night before or day of your surgery.  You may shave your face or neck.    Brush your teeth but do not swallow water.      Bring your inhaler if you are currently using one.    Bring your eyeglasses and case with you.  No contacts are to be worn the day of surgery.  You also may bring your hearing aids.      Bring your blood band if one has been given to you.  Please do not close the clasp.    If you are on C-PAP or Bi-PAP at home and plan on staying in the hospital overnight for your surgery please bring the machine with you.      Do not wear any jewelry or body piercings day of surgery.  Also, NO lotion, perfume or deodorant to be used the day of surgery.    Do not bring any valuables, such as jewelry, cash or credit cards.  If you are staying overnight with us, please bring a SMALL bag of personal items.  We cannot accommodate large items, like suitcases.      Please wear loose, comfortable clothing.  If you are potentially going to have a cast or brace bring clothing that will fit over them.                                                                                                                          In case of illness - If you have cold or flu like symptoms (high fever, runny nose, sore throat, cough, etc.) rash, nausea, vomiting, loose stools, and/or recent contact with someone who has a contagious disease (chicken pox, measles, etc.) Please call your doctor before coming to the hospital.      If your child is having surgery please make arrangements for any other children to be cared for at home on the

## 2024-04-29 ENCOUNTER — HOSPITAL ENCOUNTER (OUTPATIENT)
Dept: PREADMISSION TESTING | Age: 64
Discharge: HOME | End: 2024-05-03
Payer: COMMERCIAL

## 2024-04-29 VITALS
BODY MASS INDEX: 37.8 KG/M2 | OXYGEN SATURATION: 98 % | SYSTOLIC BLOOD PRESSURE: 168 MMHG | WEIGHT: 270 LBS | HEART RATE: 76 BPM | DIASTOLIC BLOOD PRESSURE: 86 MMHG | HEIGHT: 71 IN | TEMPERATURE: 98.1 F | RESPIRATION RATE: 18 BRPM

## 2024-04-29 DIAGNOSIS — J98.01 BRONCHOSPASM: ICD-10-CM

## 2024-04-29 DIAGNOSIS — R06.02 SOB (SHORTNESS OF BREATH): ICD-10-CM

## 2024-04-29 DIAGNOSIS — J40 BRONCHITIS: ICD-10-CM

## 2024-04-29 LAB
ANION GAP SERPL CALCULATED.3IONS-SCNC: 12 MMOL/L (ref 9–16)
BUN SERPL-MCNC: 34 MG/DL (ref 8–23)
CHLORIDE SERPL-SCNC: 107 MMOL/L (ref 98–107)
CO2 SERPL-SCNC: 20 MMOL/L (ref 20–31)
CREAT SERPL-MCNC: 2.4 MG/DL (ref 0.7–1.2)
ERYTHROCYTE [DISTWIDTH] IN BLOOD BY AUTOMATED COUNT: 15.6 % (ref 11.8–14.4)
GFR SERPL CREATININE-BSD FRML MDRD: 29 ML/MIN/1.73M2
GLUCOSE SERPL-MCNC: 140 MG/DL (ref 74–99)
HCT VFR BLD AUTO: 31.9 % (ref 40.7–50.3)
HGB BLD-MCNC: 10.1 G/DL (ref 13–17)
MCH RBC QN AUTO: 29 PG (ref 25.2–33.5)
MCHC RBC AUTO-ENTMCNC: 31.7 G/DL (ref 28.4–34.8)
MCV RBC AUTO: 91.7 FL (ref 82.6–102.9)
NRBC BLD-RTO: 0 PER 100 WBC
PLATELET # BLD AUTO: 247 K/UL (ref 138–453)
PMV BLD AUTO: 10.5 FL (ref 8.1–13.5)
POTASSIUM SERPL-SCNC: 3.9 MMOL/L (ref 3.7–5.3)
RBC # BLD AUTO: 3.48 M/UL (ref 4.21–5.77)
SODIUM SERPL-SCNC: 139 MMOL/L (ref 136–145)
WBC OTHER # BLD: 10.8 K/UL (ref 3.5–11.3)

## 2024-04-29 PROCEDURE — 85027 COMPLETE CBC AUTOMATED: CPT

## 2024-04-29 PROCEDURE — 80051 ELECTROLYTE PANEL: CPT

## 2024-04-29 PROCEDURE — 36415 COLL VENOUS BLD VENIPUNCTURE: CPT

## 2024-04-29 PROCEDURE — 82947 ASSAY GLUCOSE BLOOD QUANT: CPT

## 2024-04-29 PROCEDURE — 84520 ASSAY OF UREA NITROGEN: CPT

## 2024-04-29 PROCEDURE — 82565 ASSAY OF CREATININE: CPT

## 2024-04-29 PROCEDURE — 93005 ELECTROCARDIOGRAM TRACING: CPT | Performed by: ANESTHESIOLOGY

## 2024-04-29 RX ORDER — LISINOPRIL 5 MG/1
5 TABLET ORAL DAILY
COMMUNITY

## 2024-04-29 RX ORDER — VITAMIN E 268 MG
400 CAPSULE ORAL DAILY
COMMUNITY

## 2024-04-29 RX ORDER — ALBUTEROL SULFATE 90 UG/1
2 AEROSOL, METERED RESPIRATORY (INHALATION) 4 TIMES DAILY PRN
Qty: 6.7 EACH | Refills: 0 | Status: SHIPPED | OUTPATIENT
Start: 2024-04-29

## 2024-04-29 RX ORDER — FOLIC ACID/VIT B COMPLEX AND C 0.8 MG
1 TABLET ORAL DAILY
COMMUNITY

## 2024-04-29 ASSESSMENT — PAIN SCALES - GENERAL: PAINLEVEL_OUTOF10: 4

## 2024-04-29 ASSESSMENT — PAIN DESCRIPTION - LOCATION: LOCATION: ABDOMEN

## 2024-04-29 NOTE — PROGRESS NOTES
Anesthesia Focused Assessment      STOP-BANG Sleep Apnea Questionnaire    SNORE loudly (heard through closed doors)?   Yes  TIRED, fatigued, sleepy during daytime?    No  OBSERVED stopping breathing during sleep?   Yes  High blood PRESSURE being treated?    Yes    BMI over 35?        Yes  AGE over 50?        Yes  NECK circumference over 16\"?     No  GENDER (male)?       Yes             Total 6  High risk 5-8  Intermediate risk 3-4  Low risk 0-2    Obstructive Sleep Apnea: yes  If YES, machine used: cpap     Type 1 DM:   no  T2DM:  yes    Coronary Artery Disease:  history of atrial fibrillation post op x 1, still follows with Dr. Cagle for HTN  Hypertension:  yes    Active smoker:  no  Drinks alcohol:  rarely  Recreational drugs: edible marijuana    Dentition: benign    Defib / AICD / Pacemaker: no      Renal Failure/dialysis:  no, follows with Dr. Barnes for decreased function, solitary kidney    Patient was evaluated in PAT & anesthesia guidelines were applied.   NPO guidelines, medication instructions and scheduled arrival time were reviewed with patient.  I advised patient to please contact the surgeon's office, ahead of time if possible, if any new signs or symptoms of illness, infection, rash, etc    Hx of anesthesia complications:  no  Family hx of anesthesia complications:  no                                                                                                                     Patient follows with Dr. Barnes, nephrology, office notes in [facility].  Patient follows with Dr. Cagle, cardiology, was seen for clearance, office notes were requested from Dr. Cagle's office, clearance is in paper chart.  Patient follows regularly with PCP, office notes/follow up are in Epic from 4/2024.    Patient says that he is still working full time, overall feels OK, denies cardiac or pulmonary complaints.    FATIMAH ARRIAGA PA-C  4/29/24  2:21 PM

## 2024-04-30 LAB
EKG ATRIAL RATE: 76 BPM
EKG P AXIS: 38 DEGREES
EKG P-R INTERVAL: 174 MS
EKG Q-T INTERVAL: 414 MS
EKG QRS DURATION: 86 MS
EKG QTC CALCULATION (BAZETT): 465 MS
EKG R AXIS: 11 DEGREES
EKG T AXIS: 35 DEGREES
EKG VENTRICULAR RATE: 76 BPM

## 2024-04-30 PROCEDURE — 93010 ELECTROCARDIOGRAM REPORT: CPT | Performed by: INTERNAL MEDICINE

## 2024-04-30 RX ORDER — CLINDAMYCIN PHOSPHATE 900 MG/50ML
900 INJECTION, SOLUTION INTRAVENOUS ONCE
OUTPATIENT
Start: 2024-04-30 | End: 2024-04-30

## 2024-05-07 PROBLEM — K43.2 INCISIONAL HERNIA: Status: ACTIVE | Noted: 2021-09-24

## 2024-05-07 ASSESSMENT — ENCOUNTER SYMPTOMS
EYE PAIN: 0
ABDOMINAL DISTENTION: 0
COLOR CHANGE: 0
BLOOD IN STOOL: 0
PHOTOPHOBIA: 0
CHOKING: 0
EYE DISCHARGE: 0
ABDOMINAL PAIN: 0
SHORTNESS OF BREATH: 0
APNEA: 0

## 2024-05-08 ENCOUNTER — HOSPITAL ENCOUNTER (INPATIENT)
Age: 64
LOS: 2 days | Discharge: HOME | End: 2024-05-10
Attending: SURGERY | Admitting: SURGERY
Payer: COMMERCIAL

## 2024-05-08 ENCOUNTER — ANESTHESIA EVENT (OUTPATIENT)
Dept: OPERATING ROOM | Age: 64
End: 2024-05-08
Payer: COMMERCIAL

## 2024-05-08 ENCOUNTER — ANESTHESIA (OUTPATIENT)
Dept: OPERATING ROOM | Age: 64
End: 2024-05-08
Payer: COMMERCIAL

## 2024-05-08 DIAGNOSIS — G89.18 POST-OP PAIN: Primary | ICD-10-CM

## 2024-05-08 PROBLEM — K43.9 VENTRAL HERNIA WITHOUT OBSTRUCTION OR GANGRENE: Status: ACTIVE | Noted: 2024-05-08

## 2024-05-08 LAB
BUN BLD-MCNC: 45 MG/DL (ref 8–26)
CA-I BLD-SCNC: 1.25 MMOL/L (ref 1.15–1.33)
CHLORIDE BLD-SCNC: 106 MMOL/L (ref 98–107)
CO2 BLD CALC-SCNC: 26 MMOL/L (ref 22–30)
EGFR, POC: 21 ML/MIN/1.73M2
GLUCOSE BLD-MCNC: 142 MG/DL (ref 74–100)
GLUCOSE BLD-MCNC: 164 MG/DL (ref 75–110)
HCT VFR BLD AUTO: 41 % (ref 41–53)
POC ANION GAP: 11 MMOL/L (ref 7–16)
POC CREATININE: 3.1 MG/DL (ref 0.51–1.19)
POC HEMOGLOBIN (CALC): 13.9 G/DL (ref 13.5–17.5)
POC LACTIC ACID: 1.1 MMOL/L (ref 0.56–1.39)
POTASSIUM BLD-SCNC: 4.2 MMOL/L (ref 3.5–4.5)
SODIUM BLD-SCNC: 142 MMOL/L (ref 138–146)

## 2024-05-08 PROCEDURE — 3600000014 HC SURGERY LEVEL 4 ADDTL 15MIN: Performed by: SURGERY

## 2024-05-08 PROCEDURE — 2580000003 HC RX 258: Performed by: STUDENT IN AN ORGANIZED HEALTH CARE EDUCATION/TRAINING PROGRAM

## 2024-05-08 PROCEDURE — 82565 ASSAY OF CREATININE: CPT

## 2024-05-08 PROCEDURE — 83605 ASSAY OF LACTIC ACID: CPT

## 2024-05-08 PROCEDURE — 82947 ASSAY GLUCOSE BLOOD QUANT: CPT

## 2024-05-08 PROCEDURE — 6370000000 HC RX 637 (ALT 250 FOR IP): Performed by: STUDENT IN AN ORGANIZED HEALTH CARE EDUCATION/TRAINING PROGRAM

## 2024-05-08 PROCEDURE — 2580000003 HC RX 258: Performed by: ANESTHESIOLOGY

## 2024-05-08 PROCEDURE — 2500000003 HC RX 250 WO HCPCS

## 2024-05-08 PROCEDURE — 2580000003 HC RX 258: Performed by: SURGERY

## 2024-05-08 PROCEDURE — 6360000002 HC RX W HCPCS: Performed by: SURGERY

## 2024-05-08 PROCEDURE — 0WUF0JZ SUPPLEMENT ABDOMINAL WALL WITH SYNTHETIC SUBSTITUTE, OPEN APPROACH: ICD-10-PCS | Performed by: SURGERY

## 2024-05-08 PROCEDURE — 3700000000 HC ANESTHESIA ATTENDED CARE: Performed by: SURGERY

## 2024-05-08 PROCEDURE — 7100000001 HC PACU RECOVERY - ADDTL 15 MIN: Performed by: SURGERY

## 2024-05-08 PROCEDURE — C1781 MESH (IMPLANTABLE): HCPCS | Performed by: SURGERY

## 2024-05-08 PROCEDURE — 82330 ASSAY OF CALCIUM: CPT

## 2024-05-08 PROCEDURE — 7100000000 HC PACU RECOVERY - FIRST 15 MIN: Performed by: SURGERY

## 2024-05-08 PROCEDURE — 3700000001 HC ADD 15 MINUTES (ANESTHESIA): Performed by: SURGERY

## 2024-05-08 PROCEDURE — 80051 ELECTROLYTE PANEL: CPT

## 2024-05-08 PROCEDURE — 1200000000 HC SEMI PRIVATE

## 2024-05-08 PROCEDURE — 6360000002 HC RX W HCPCS: Performed by: STUDENT IN AN ORGANIZED HEALTH CARE EDUCATION/TRAINING PROGRAM

## 2024-05-08 PROCEDURE — 84520 ASSAY OF UREA NITROGEN: CPT

## 2024-05-08 PROCEDURE — 6360000002 HC RX W HCPCS

## 2024-05-08 PROCEDURE — 3600000004 HC SURGERY LEVEL 4 BASE: Performed by: SURGERY

## 2024-05-08 PROCEDURE — 2709999900 HC NON-CHARGEABLE SUPPLY: Performed by: SURGERY

## 2024-05-08 PROCEDURE — 76942 ECHO GUIDE FOR BIOPSY: CPT | Performed by: STUDENT IN AN ORGANIZED HEALTH CARE EDUCATION/TRAINING PROGRAM

## 2024-05-08 PROCEDURE — 85014 HEMATOCRIT: CPT

## 2024-05-08 DEVICE — MESH SURG W20XL25CM SEPRA TECHNOLOGY RECT PHASIX: Type: IMPLANTABLE DEVICE | Site: ABDOMEN | Status: FUNCTIONAL

## 2024-05-08 RX ORDER — SODIUM CHLORIDE 0.9 % (FLUSH) 0.9 %
5-40 SYRINGE (ML) INJECTION PRN
Status: DISCONTINUED | OUTPATIENT
Start: 2024-05-08 | End: 2024-05-10 | Stop reason: HOSPADM

## 2024-05-08 RX ORDER — NALOXONE HYDROCHLORIDE 0.4 MG/ML
INJECTION, SOLUTION INTRAMUSCULAR; INTRAVENOUS; SUBCUTANEOUS PRN
Status: DISCONTINUED | OUTPATIENT
Start: 2024-05-08 | End: 2024-05-08 | Stop reason: HOSPADM

## 2024-05-08 RX ORDER — ROCURONIUM BROMIDE 10 MG/ML
INJECTION, SOLUTION INTRAVENOUS PRN
Status: DISCONTINUED | OUTPATIENT
Start: 2024-05-08 | End: 2024-05-08 | Stop reason: SDUPTHER

## 2024-05-08 RX ORDER — PHENYLEPHRINE HCL IN 0.9% NACL 1 MG/10 ML
SYRINGE (ML) INTRAVENOUS PRN
Status: DISCONTINUED | OUTPATIENT
Start: 2024-05-08 | End: 2024-05-08 | Stop reason: SDUPTHER

## 2024-05-08 RX ORDER — METOPROLOL TARTRATE 50 MG/1
100 TABLET, FILM COATED ORAL 2 TIMES DAILY
Status: DISCONTINUED | OUTPATIENT
Start: 2024-05-08 | End: 2024-05-10 | Stop reason: HOSPADM

## 2024-05-08 RX ORDER — HYDRALAZINE HYDROCHLORIDE 20 MG/ML
10 INJECTION INTRAMUSCULAR; INTRAVENOUS
Status: DISCONTINUED | OUTPATIENT
Start: 2024-05-08 | End: 2024-05-08 | Stop reason: HOSPADM

## 2024-05-08 RX ORDER — PRAVASTATIN SODIUM 20 MG
20 TABLET ORAL DAILY
Status: DISCONTINUED | OUTPATIENT
Start: 2024-05-08 | End: 2024-05-10 | Stop reason: HOSPADM

## 2024-05-08 RX ORDER — OXYCODONE HYDROCHLORIDE 5 MG/1
5 TABLET ORAL EVERY 6 HOURS PRN
Status: DISCONTINUED | OUTPATIENT
Start: 2024-05-08 | End: 2024-05-10 | Stop reason: HOSPADM

## 2024-05-08 RX ORDER — BUPIVACAINE HYDROCHLORIDE 5 MG/ML
INJECTION, SOLUTION EPIDURAL; INTRACAUDAL
Status: COMPLETED | OUTPATIENT
Start: 2024-05-08 | End: 2024-05-08

## 2024-05-08 RX ORDER — PROPOFOL 10 MG/ML
INJECTION, EMULSION INTRAVENOUS PRN
Status: DISCONTINUED | OUTPATIENT
Start: 2024-05-08 | End: 2024-05-08 | Stop reason: SDUPTHER

## 2024-05-08 RX ORDER — MORPHINE SULFATE 2 MG/ML
2 INJECTION, SOLUTION INTRAMUSCULAR; INTRAVENOUS
Status: DISCONTINUED | OUTPATIENT
Start: 2024-05-08 | End: 2024-05-10

## 2024-05-08 RX ORDER — FENTANYL CITRATE 50 UG/ML
INJECTION, SOLUTION INTRAMUSCULAR; INTRAVENOUS PRN
Status: DISCONTINUED | OUTPATIENT
Start: 2024-05-08 | End: 2024-05-08 | Stop reason: SDUPTHER

## 2024-05-08 RX ORDER — SODIUM CHLORIDE 0.9 % (FLUSH) 0.9 %
5-40 SYRINGE (ML) INJECTION PRN
Status: DISCONTINUED | OUTPATIENT
Start: 2024-05-08 | End: 2024-05-08 | Stop reason: HOSPADM

## 2024-05-08 RX ORDER — ACETAMINOPHEN 500 MG
1000 TABLET ORAL EVERY 8 HOURS SCHEDULED
Status: DISCONTINUED | OUTPATIENT
Start: 2024-05-08 | End: 2024-05-10 | Stop reason: HOSPADM

## 2024-05-08 RX ORDER — PANTOPRAZOLE SODIUM 40 MG/1
40 TABLET, DELAYED RELEASE ORAL
Status: DISCONTINUED | OUTPATIENT
Start: 2024-05-08 | End: 2024-05-10 | Stop reason: HOSPADM

## 2024-05-08 RX ORDER — GLYCOPYRROLATE 0.2 MG/ML
INJECTION INTRAMUSCULAR; INTRAVENOUS PRN
Status: DISCONTINUED | OUTPATIENT
Start: 2024-05-08 | End: 2024-05-08 | Stop reason: SDUPTHER

## 2024-05-08 RX ORDER — SODIUM CHLORIDE 0.9 % (FLUSH) 0.9 %
5-40 SYRINGE (ML) INJECTION EVERY 12 HOURS SCHEDULED
Status: DISCONTINUED | OUTPATIENT
Start: 2024-05-08 | End: 2024-05-08 | Stop reason: HOSPADM

## 2024-05-08 RX ORDER — SODIUM CHLORIDE, SODIUM LACTATE, POTASSIUM CHLORIDE, CALCIUM CHLORIDE 600; 310; 30; 20 MG/100ML; MG/100ML; MG/100ML; MG/100ML
INJECTION, SOLUTION INTRAVENOUS CONTINUOUS
Status: DISCONTINUED | OUTPATIENT
Start: 2024-05-08 | End: 2024-05-08 | Stop reason: HOSPADM

## 2024-05-08 RX ORDER — SODIUM CHLORIDE 0.9 % (FLUSH) 0.9 %
5-40 SYRINGE (ML) INJECTION EVERY 12 HOURS SCHEDULED
Status: DISCONTINUED | OUTPATIENT
Start: 2024-05-08 | End: 2024-05-10 | Stop reason: HOSPADM

## 2024-05-08 RX ORDER — HEPARIN SODIUM 5000 [USP'U]/ML
5000 INJECTION, SOLUTION INTRAVENOUS; SUBCUTANEOUS EVERY 8 HOURS SCHEDULED
Status: DISCONTINUED | OUTPATIENT
Start: 2024-05-09 | End: 2024-05-10 | Stop reason: HOSPADM

## 2024-05-08 RX ORDER — ONDANSETRON 2 MG/ML
4 INJECTION INTRAMUSCULAR; INTRAVENOUS
Status: DISCONTINUED | OUTPATIENT
Start: 2024-05-08 | End: 2024-05-08 | Stop reason: HOSPADM

## 2024-05-08 RX ORDER — ENOXAPARIN SODIUM 100 MG/ML
40 INJECTION SUBCUTANEOUS DAILY
Status: DISCONTINUED | OUTPATIENT
Start: 2024-05-09 | End: 2024-05-08

## 2024-05-08 RX ORDER — SODIUM CHLORIDE, SODIUM LACTATE, POTASSIUM CHLORIDE, CALCIUM CHLORIDE 600; 310; 30; 20 MG/100ML; MG/100ML; MG/100ML; MG/100ML
INJECTION, SOLUTION INTRAVENOUS CONTINUOUS
Status: DISCONTINUED | OUTPATIENT
Start: 2024-05-08 | End: 2024-05-10

## 2024-05-08 RX ORDER — CLINDAMYCIN PHOSPHATE 900 MG/50ML
900 INJECTION, SOLUTION INTRAVENOUS ONCE
Status: COMPLETED | OUTPATIENT
Start: 2024-05-08 | End: 2024-05-08

## 2024-05-08 RX ORDER — SODIUM CHLORIDE 9 MG/ML
INJECTION, SOLUTION INTRAVENOUS PRN
Status: DISCONTINUED | OUTPATIENT
Start: 2024-05-08 | End: 2024-05-08 | Stop reason: HOSPADM

## 2024-05-08 RX ORDER — ONDANSETRON 4 MG/1
4 TABLET, ORALLY DISINTEGRATING ORAL EVERY 8 HOURS PRN
Status: DISCONTINUED | OUTPATIENT
Start: 2024-05-08 | End: 2024-05-10 | Stop reason: HOSPADM

## 2024-05-08 RX ORDER — METOCLOPRAMIDE HYDROCHLORIDE 5 MG/ML
5 INJECTION INTRAMUSCULAR; INTRAVENOUS EVERY 6 HOURS
Status: DISCONTINUED | OUTPATIENT
Start: 2024-05-08 | End: 2024-05-10 | Stop reason: HOSPADM

## 2024-05-08 RX ORDER — SODIUM CHLORIDE 9 MG/ML
INJECTION, SOLUTION INTRAVENOUS PRN
Status: DISCONTINUED | OUTPATIENT
Start: 2024-05-08 | End: 2024-05-10 | Stop reason: HOSPADM

## 2024-05-08 RX ORDER — MAGNESIUM HYDROXIDE 1200 MG/15ML
LIQUID ORAL CONTINUOUS PRN
Status: DISCONTINUED | OUTPATIENT
Start: 2024-05-08 | End: 2024-05-08 | Stop reason: HOSPADM

## 2024-05-08 RX ORDER — ONDANSETRON 2 MG/ML
4 INJECTION INTRAMUSCULAR; INTRAVENOUS EVERY 6 HOURS PRN
Status: DISCONTINUED | OUTPATIENT
Start: 2024-05-08 | End: 2024-05-10 | Stop reason: HOSPADM

## 2024-05-08 RX ORDER — METHOCARBAMOL 750 MG/1
750 TABLET, FILM COATED ORAL EVERY 6 HOURS SCHEDULED
Status: DISCONTINUED | OUTPATIENT
Start: 2024-05-08 | End: 2024-05-10 | Stop reason: HOSPADM

## 2024-05-08 RX ORDER — ONDANSETRON 2 MG/ML
INJECTION INTRAMUSCULAR; INTRAVENOUS PRN
Status: DISCONTINUED | OUTPATIENT
Start: 2024-05-08 | End: 2024-05-08 | Stop reason: SDUPTHER

## 2024-05-08 RX ORDER — TRAZODONE HYDROCHLORIDE 50 MG/1
50 TABLET ORAL NIGHTLY
Status: DISCONTINUED | OUTPATIENT
Start: 2024-05-08 | End: 2024-05-10 | Stop reason: HOSPADM

## 2024-05-08 RX ORDER — LIDOCAINE HYDROCHLORIDE 10 MG/ML
INJECTION, SOLUTION EPIDURAL; INFILTRATION; INTRACAUDAL; PERINEURAL PRN
Status: DISCONTINUED | OUTPATIENT
Start: 2024-05-08 | End: 2024-05-08 | Stop reason: SDUPTHER

## 2024-05-08 RX ORDER — ALBUTEROL SULFATE 90 UG/1
2 AEROSOL, METERED RESPIRATORY (INHALATION) EVERY 4 HOURS PRN
Status: DISCONTINUED | OUTPATIENT
Start: 2024-05-08 | End: 2024-05-10 | Stop reason: HOSPADM

## 2024-05-08 RX ORDER — DEXAMETHASONE SODIUM PHOSPHATE 10 MG/ML
INJECTION INTRAMUSCULAR; INTRAVENOUS PRN
Status: DISCONTINUED | OUTPATIENT
Start: 2024-05-08 | End: 2024-05-08 | Stop reason: SDUPTHER

## 2024-05-08 RX ADMIN — SODIUM CHLORIDE, POTASSIUM CHLORIDE, SODIUM LACTATE AND CALCIUM CHLORIDE: 600; 310; 30; 20 INJECTION, SOLUTION INTRAVENOUS at 14:52

## 2024-05-08 RX ADMIN — Medication 100 MCG: at 12:11

## 2024-05-08 RX ADMIN — METOCLOPRAMIDE 5 MG: 5 INJECTION, SOLUTION INTRAMUSCULAR; INTRAVENOUS at 18:10

## 2024-05-08 RX ADMIN — SODIUM CHLORIDE, POTASSIUM CHLORIDE, SODIUM LACTATE AND CALCIUM CHLORIDE: 600; 310; 30; 20 INJECTION, SOLUTION INTRAVENOUS at 11:12

## 2024-05-08 RX ADMIN — ROCURONIUM BROMIDE 50 MG: 10 INJECTION, SOLUTION INTRAVENOUS at 11:22

## 2024-05-08 RX ADMIN — GLYCOPYRROLATE 0.2 MG: 0.2 INJECTION INTRAMUSCULAR; INTRAVENOUS at 11:59

## 2024-05-08 RX ADMIN — DEXAMETHASONE SODIUM PHOSPHATE 4 MG: 10 INJECTION INTRAMUSCULAR; INTRAVENOUS at 11:27

## 2024-05-08 RX ADMIN — ACETAMINOPHEN 1000 MG: 500 TABLET ORAL at 20:32

## 2024-05-08 RX ADMIN — TRAZODONE HYDROCHLORIDE 50 MG: 50 TABLET ORAL at 20:31

## 2024-05-08 RX ADMIN — PANTOPRAZOLE SODIUM 40 MG: 40 TABLET, DELAYED RELEASE ORAL at 16:22

## 2024-05-08 RX ADMIN — SODIUM CHLORIDE, PRESERVATIVE FREE 10 ML: 5 INJECTION INTRAVENOUS at 22:26

## 2024-05-08 RX ADMIN — LIDOCAINE HYDROCHLORIDE 50 MG: 10 INJECTION, SOLUTION EPIDURAL; INFILTRATION; INTRACAUDAL; PERINEURAL at 11:21

## 2024-05-08 RX ADMIN — HYDROMORPHONE HYDROCHLORIDE 0.5 MG: 1 INJECTION, SOLUTION INTRAMUSCULAR; INTRAVENOUS; SUBCUTANEOUS at 13:41

## 2024-05-08 RX ADMIN — FENTANYL CITRATE 50 MCG: 50 INJECTION, SOLUTION INTRAMUSCULAR; INTRAVENOUS at 11:41

## 2024-05-08 RX ADMIN — OXYCODONE 5 MG: 5 TABLET ORAL at 18:44

## 2024-05-08 RX ADMIN — METOPROLOL TARTRATE 100 MG: 50 TABLET, FILM COATED ORAL at 20:32

## 2024-05-08 RX ADMIN — MORPHINE SULFATE 2 MG: 2 INJECTION, SOLUTION INTRAMUSCULAR; INTRAVENOUS at 22:25

## 2024-05-08 RX ADMIN — SUGAMMADEX 300 MG: 100 INJECTION, SOLUTION INTRAVENOUS at 12:58

## 2024-05-08 RX ADMIN — BUPIVACAINE HYDROCHLORIDE 30 ML: 5 INJECTION, SOLUTION EPIDURAL; INTRACAUDAL; PERINEURAL at 10:55

## 2024-05-08 RX ADMIN — FENTANYL CITRATE 50 MCG: 50 INJECTION, SOLUTION INTRAMUSCULAR; INTRAVENOUS at 11:18

## 2024-05-08 RX ADMIN — FENTANYL CITRATE 50 MCG: 50 INJECTION, SOLUTION INTRAMUSCULAR; INTRAVENOUS at 13:00

## 2024-05-08 RX ADMIN — CLINDAMYCIN IN 5 PERCENT DEXTROSE 900 MG: 18 INJECTION, SOLUTION INTRAVENOUS at 11:27

## 2024-05-08 RX ADMIN — METHOCARBAMOL 750 MG: 750 TABLET ORAL at 16:58

## 2024-05-08 RX ADMIN — FENTANYL CITRATE 50 MCG: 50 INJECTION, SOLUTION INTRAMUSCULAR; INTRAVENOUS at 13:06

## 2024-05-08 RX ADMIN — PRAVASTATIN SODIUM 20 MG: 20 TABLET ORAL at 16:58

## 2024-05-08 RX ADMIN — Medication 100 MCG: at 12:24

## 2024-05-08 RX ADMIN — ACETAMINOPHEN 1000 MG: 500 TABLET ORAL at 16:23

## 2024-05-08 RX ADMIN — ONDANSETRON 4 MG: 2 INJECTION INTRAMUSCULAR; INTRAVENOUS at 12:53

## 2024-05-08 RX ADMIN — ROCURONIUM BROMIDE 20 MG: 10 INJECTION, SOLUTION INTRAVENOUS at 11:44

## 2024-05-08 RX ADMIN — PROPOFOL 200 MG: 10 INJECTION, EMULSION INTRAVENOUS at 11:21

## 2024-05-08 RX ADMIN — HYDROMORPHONE HYDROCHLORIDE 0.5 MG: 1 INJECTION, SOLUTION INTRAMUSCULAR; INTRAVENOUS; SUBCUTANEOUS at 14:17

## 2024-05-08 RX ADMIN — Medication 100 MCG: at 12:19

## 2024-05-08 ASSESSMENT — PAIN DESCRIPTION - DESCRIPTORS
DESCRIPTORS: ACHING
DESCRIPTORS: DISCOMFORT
DESCRIPTORS: ACHING
DESCRIPTORS: ACHING
DESCRIPTORS: DISCOMFORT
DESCRIPTORS: ACHING

## 2024-05-08 ASSESSMENT — PAIN SCALES - GENERAL
PAINLEVEL_OUTOF10: 6
PAINLEVEL_OUTOF10: 8
PAINLEVEL_OUTOF10: 6
PAINLEVEL_OUTOF10: 4
PAINLEVEL_OUTOF10: 6
PAINLEVEL_OUTOF10: 8
PAINLEVEL_OUTOF10: 7

## 2024-05-08 ASSESSMENT — PAIN DESCRIPTION - LOCATION
LOCATION: ABDOMEN

## 2024-05-08 ASSESSMENT — PAIN DESCRIPTION - ORIENTATION
ORIENTATION: MID
ORIENTATION: MID;UPPER
ORIENTATION: MID;UPPER

## 2024-05-08 ASSESSMENT — ENCOUNTER SYMPTOMS: SHORTNESS OF BREATH: 0

## 2024-05-08 ASSESSMENT — PAIN - FUNCTIONAL ASSESSMENT
PAIN_FUNCTIONAL_ASSESSMENT: 0-10
PAIN_FUNCTIONAL_ASSESSMENT: 0-10
PAIN_FUNCTIONAL_ASSESSMENT: ACTIVITIES ARE NOT PREVENTED

## 2024-05-08 NOTE — OP NOTE
Operative Note      Patient: Prateek Denton II  YOB: 1960  MRN: [id number]    Date of Procedure: 5/8/2024    Pre-Op Diagnosis Codes:     * Incisional hernia, without obstruction or gangrene [K43.2]    Post-Op Diagnosis: Same 15 x 18 cm       Procedure(s):  OPEN INCISIONAL HERNIA REPAIR WITH MESH 15 x 18 cm    Surgeon(s):  Jagjit Doty DO    Assistant:   Cuco Peter DO    Anesthesia: General    Estimated Blood Loss (mL): less than 100     Complications: None    Specimens:   * No specimens in log *    Implants:  Implant Name Type Inv. Item Serial No.  Lot No. LRB No. Used Action   MESH SURG [id number] [employer] RECT [employer]ASIX - [id number]  MESH SURG [id number] [employer] RECT PHASIX  BARD DAVOL-WD [id number] N/A 1 Implanted         Drains:   Closed/Suction Drain Superior;Medial;Midline Abdomen Bulb (Active)   Site Description Unable to view 05/08/24 1551   Dressing Status Clean, dry & intact 05/08/24 1819   Drainage Appearance Bloody 05/08/24 1819   Drain Status Compressed;To bulb suction 05/08/24 1819   Output (ml) 40 ml 05/08/24 1819       [REMOVED] Urinary Catheter 05/08/24 2 Way (Removed)       Findings:  Infection Present At Time Of Surgery (PATOS) (choose all levels that have infection present):  No infection present  Other Findings: large defect [id number]     Detailed Description of Procedure:   Indication:  Patient is 64-year-old gentleman with prior ex lap incision who had large ventral hernia with lateral retraction of the fascia.  Patient was offered open ventral hernia repair with mesh.  Risk, benefits, and alternatives were discussed with the patient.  Informed consent was obtained.    Procedure:  Patient was taken to the operative suite and placed supine on the operating table.  Anesthesia team initiated general anesthesia.  Patient was then prepped and draped in the typical sterile fashion.  A timeout was called to ensure proper patient, position, and

## 2024-05-08 NOTE — BRIEF OP NOTE
Brief Postoperative Note      Patient: Prateek Denton II  YOB: 1960  MRN: [id number]    Date of Procedure: 5/8/2024    Pre-Op Diagnosis Codes:     * Incisional hernia, without obstruction or gangrene [K43.2]    Post-Op Diagnosis: Same       Procedure(s):  OPEN INCISIONAL HERNIA REPAIR WITH MESH    Surgeon(s):  Jagjit Doty DO    Assistant:  Cuco Peter DO    Anesthesia: General    Estimated Blood Loss (mL): less than 100     Complications: None    Specimens:   * No specimens in log *    Implants:  Implant Name Type Inv. Item Serial No.  Lot No. LRB No. Used Action   MESH SURG [id number] [employer] RECT PHASIX - [id number]  MESH SURG [id number] [employer] RE[employer] PHASIX  BARD DAVOL-WD [id number] N/A 1 Implanted         Drains:   Closed/Suction Drain Superior;Medial;Midline Abdomen Bulb (Active)       Urinary Catheter 05/08/24 2 Way (Active)       Findings:  Infection Present At Time Of Surgery (PATOS) (choose all levels that have infection present):  No infection present  Other Findings: large defect [id number]    Electronically signed by Cuco Peter DO on 5/8/2024 at 1:14 PM   Please let patient know that her bhcg is at  7; will need just one more draw. Have her schedule her ultrasound and labs for recurrent pregnancy loss about 4 weeks after bhcg drops to less than 4. She should follow up with one of my partners in about 4-6 weeks.

## 2024-05-08 NOTE — ANESTHESIA PRE PROCEDURE
full  Mouth opening: > = 3 FB   Dental:          Pulmonary:   (+)     sleep apnea:           (-) shortness of breath                           Cardiovascular:    (+) hypertension:    (-) dysrhythmias and  angina      Rhythm: regular                      Neuro/Psych:   (+) headaches:            GI/Hepatic/Renal:             Endo/Other:    (+) Diabetes.                 Abdominal:             Vascular:          Other Findings:       Anesthesia Plan      general     ASA 3       Induction: intravenous.    MIPS: Postoperative opioids intended and Prophylactic antiemetics administered.  Anesthetic plan and risks discussed with patient.      Plan discussed with CRNA.                Danny Milan MD   5/8/2024

## 2024-05-08 NOTE — ANESTHESIA POSTPROCEDURE EVALUATION
Department of Anesthesiology  Postprocedure Note    Patient: Prateek Denton II  MRN: [id number]  YOB: 1960  Date of evaluation: 5/8/2024    Procedure Summary       Date: 05/08/24 Room / Location: [address]    Anesthesia Start: 1112 Anesthesia Stop: 1308    Procedure: OPEN INCISIONAL HERNIA REPAIR WITH MESH (Abdomen) Diagnosis:       Incisional hernia, without obstruction or gangrene      (Incisional hernia, without obstruction or gangrene [K43.2])    Surgeons: Jagjit Doty DO Responsible Provider: Danny Milan MD    Anesthesia Type: general ASA Status: 3            Anesthesia Type: No value filed.    Destiny Phase I: Destiny Score: 9    Destiny Phase II:      Anesthesia Post Evaluation    Patient location during evaluation: bedside  Patient participation: complete - patient participated  Level of consciousness: awake  Airway patency: patent  Nausea & Vomiting: no nausea and no vomiting  Cardiovascular status: blood pressure returned to baseline  Respiratory status: acceptable  Hydration status: euvolemic  Comments: /75   Pulse 70   Temp 97.3 °F (36.3 °C) (Temporal)   Resp 16   SpO2 96%     Pain management: adequate    No notable events documented.

## 2024-05-09 LAB
ANION GAP SERPL CALCULATED.3IONS-SCNC: 11 MMOL/L (ref 9–16)
BASOPHILS # BLD: 0.04 K/UL (ref 0–0.2)
BASOPHILS NFR BLD: 0 % (ref 0–2)
BUN SERPL-MCNC: 37 MG/DL (ref 8–23)
CALCIUM SERPL-MCNC: 9.1 MG/DL (ref 8.6–10.4)
CHLORIDE SERPL-SCNC: 102 MMOL/L (ref 98–107)
CO2 SERPL-SCNC: 22 MMOL/L (ref 20–31)
CREAT SERPL-MCNC: 2.9 MG/DL (ref 0.7–1.2)
EOSINOPHIL # BLD: <0.03 K/UL (ref 0–0.44)
EOSINOPHILS RELATIVE PERCENT: 0 % (ref 1–4)
ERYTHROCYTE [DISTWIDTH] IN BLOOD BY AUTOMATED COUNT: 14.6 % (ref 11.8–14.4)
GFR, ESTIMATED: 24 ML/MIN/1.73M2
GLUCOSE SERPL-MCNC: 169 MG/DL (ref 74–99)
HCT VFR BLD AUTO: 34.7 % (ref 40.7–50.3)
HGB BLD-MCNC: 11 G/DL (ref 13–17)
IMM GRANULOCYTES # BLD AUTO: 0.08 K/UL (ref 0–0.3)
IMM GRANULOCYTES NFR BLD: 1 %
LYMPHOCYTES NFR BLD: 1.26 K/UL (ref 1.1–3.7)
LYMPHOCYTES RELATIVE PERCENT: 9 % (ref 24–43)
MCH RBC QN AUTO: 28.6 PG (ref 25.2–33.5)
MCHC RBC AUTO-ENTMCNC: 31.7 G/DL (ref 28.4–34.8)
MCV RBC AUTO: 90.4 FL (ref 82.6–102.9)
MONOCYTES NFR BLD: 1.22 K/UL (ref 0.1–1.2)
MONOCYTES NFR BLD: 9 % (ref 3–12)
NEUTROPHILS NFR BLD: 81 % (ref 36–65)
NEUTS SEG NFR BLD: 11.67 K/UL (ref 1.5–8.1)
NRBC BLD-RTO: 0 PER 100 WBC
PLATELET # BLD AUTO: 291 K/UL (ref 138–453)
PMV BLD AUTO: 10.1 FL (ref 8.1–13.5)
POTASSIUM SERPL-SCNC: 5.1 MMOL/L (ref 3.7–5.3)
RBC # BLD AUTO: 3.84 M/UL (ref 4.21–5.77)
RBC # BLD: ABNORMAL 10*6/UL
SODIUM SERPL-SCNC: 135 MMOL/L (ref 136–145)
WBC OTHER # BLD: 14.3 K/UL (ref 3.5–11.3)

## 2024-05-09 PROCEDURE — 1200000000 HC SEMI PRIVATE

## 2024-05-09 PROCEDURE — 6360000002 HC RX W HCPCS: Performed by: STUDENT IN AN ORGANIZED HEALTH CARE EDUCATION/TRAINING PROGRAM

## 2024-05-09 PROCEDURE — 6370000000 HC RX 637 (ALT 250 FOR IP): Performed by: STUDENT IN AN ORGANIZED HEALTH CARE EDUCATION/TRAINING PROGRAM

## 2024-05-09 PROCEDURE — 80048 BASIC METABOLIC PNL TOTAL CA: CPT

## 2024-05-09 PROCEDURE — 6360000002 HC RX W HCPCS: Performed by: SURGERY

## 2024-05-09 PROCEDURE — 2580000003 HC RX 258: Performed by: STUDENT IN AN ORGANIZED HEALTH CARE EDUCATION/TRAINING PROGRAM

## 2024-05-09 PROCEDURE — 36415 COLL VENOUS BLD VENIPUNCTURE: CPT

## 2024-05-09 PROCEDURE — 85025 COMPLETE CBC W/AUTO DIFF WBC: CPT

## 2024-05-09 RX ORDER — DOCUSATE SODIUM 100 MG/1
100 CAPSULE, LIQUID FILLED ORAL 2 TIMES DAILY
Status: DISCONTINUED | OUTPATIENT
Start: 2024-05-09 | End: 2024-05-10 | Stop reason: HOSPADM

## 2024-05-09 RX ORDER — POLYETHYLENE GLYCOL 3350 17 G/17G
17 POWDER, FOR SOLUTION ORAL DAILY
Status: DISCONTINUED | OUTPATIENT
Start: 2024-05-09 | End: 2024-05-10 | Stop reason: HOSPADM

## 2024-05-09 RX ORDER — SENNOSIDES A AND B 8.6 MG/1
1 TABLET, FILM COATED ORAL NIGHTLY
Status: DISCONTINUED | OUTPATIENT
Start: 2024-05-09 | End: 2024-05-10 | Stop reason: HOSPADM

## 2024-05-09 RX ADMIN — OXYCODONE 5 MG: 5 TABLET ORAL at 06:11

## 2024-05-09 RX ADMIN — TRAZODONE HYDROCHLORIDE 50 MG: 50 TABLET ORAL at 20:49

## 2024-05-09 RX ADMIN — SODIUM CHLORIDE, POTASSIUM CHLORIDE, SODIUM LACTATE AND CALCIUM CHLORIDE: 600; 310; 30; 20 INJECTION, SOLUTION INTRAVENOUS at 00:20

## 2024-05-09 RX ADMIN — METOCLOPRAMIDE 5 MG: 5 INJECTION, SOLUTION INTRAMUSCULAR; INTRAVENOUS at 05:54

## 2024-05-09 RX ADMIN — OXYCODONE 5 MG: 5 TABLET ORAL at 00:29

## 2024-05-09 RX ADMIN — HEPARIN SODIUM 5000 UNITS: 5000 INJECTION INTRAVENOUS; SUBCUTANEOUS at 22:27

## 2024-05-09 RX ADMIN — ACETAMINOPHEN 1000 MG: 500 TABLET ORAL at 20:49

## 2024-05-09 RX ADMIN — SENNOSIDES 8.6 MG: 8.6 TABLET, FILM COATED ORAL at 20:49

## 2024-05-09 RX ADMIN — OXYCODONE 5 MG: 5 TABLET ORAL at 10:49

## 2024-05-09 RX ADMIN — METOPROLOL TARTRATE 100 MG: 50 TABLET, FILM COATED ORAL at 09:17

## 2024-05-09 RX ADMIN — HEPARIN SODIUM 5000 UNITS: 5000 INJECTION INTRAVENOUS; SUBCUTANEOUS at 14:16

## 2024-05-09 RX ADMIN — METHOCARBAMOL 750 MG: 750 TABLET ORAL at 05:53

## 2024-05-09 RX ADMIN — PANTOPRAZOLE SODIUM 40 MG: 40 TABLET, DELAYED RELEASE ORAL at 16:36

## 2024-05-09 RX ADMIN — ACETAMINOPHEN 1000 MG: 500 TABLET ORAL at 14:16

## 2024-05-09 RX ADMIN — METHOCARBAMOL 750 MG: 750 TABLET ORAL at 12:27

## 2024-05-09 RX ADMIN — METHOCARBAMOL 750 MG: 750 TABLET ORAL at 00:21

## 2024-05-09 RX ADMIN — METOCLOPRAMIDE 5 MG: 5 INJECTION, SOLUTION INTRAMUSCULAR; INTRAVENOUS at 12:26

## 2024-05-09 RX ADMIN — METOCLOPRAMIDE 5 MG: 5 INJECTION, SOLUTION INTRAMUSCULAR; INTRAVENOUS at 00:22

## 2024-05-09 RX ADMIN — METOCLOPRAMIDE 5 MG: 5 INJECTION, SOLUTION INTRAMUSCULAR; INTRAVENOUS at 18:06

## 2024-05-09 RX ADMIN — OXYCODONE 5 MG: 5 TABLET ORAL at 19:38

## 2024-05-09 RX ADMIN — METHOCARBAMOL 750 MG: 750 TABLET ORAL at 18:06

## 2024-05-09 RX ADMIN — ACETAMINOPHEN 1000 MG: 500 TABLET ORAL at 05:53

## 2024-05-09 RX ADMIN — MORPHINE SULFATE 2 MG: 2 INJECTION, SOLUTION INTRAMUSCULAR; INTRAVENOUS at 22:27

## 2024-05-09 RX ADMIN — METOPROLOL TARTRATE 100 MG: 50 TABLET, FILM COATED ORAL at 20:49

## 2024-05-09 RX ADMIN — PANTOPRAZOLE SODIUM 40 MG: 40 TABLET, DELAYED RELEASE ORAL at 05:53

## 2024-05-09 RX ADMIN — DOCUSATE SODIUM 100 MG: 100 CAPSULE, LIQUID FILLED ORAL at 09:17

## 2024-05-09 RX ADMIN — POLYETHYLENE GLYCOL 3350 17 G: 17 POWDER, FOR SOLUTION ORAL at 09:17

## 2024-05-09 RX ADMIN — DOCUSATE SODIUM 100 MG: 100 CAPSULE, LIQUID FILLED ORAL at 20:49

## 2024-05-09 RX ADMIN — PRAVASTATIN SODIUM 20 MG: 20 TABLET ORAL at 09:18

## 2024-05-09 RX ADMIN — SODIUM CHLORIDE, POTASSIUM CHLORIDE, SODIUM LACTATE AND CALCIUM CHLORIDE: 600; 310; 30; 20 INJECTION, SOLUTION INTRAVENOUS at 22:28

## 2024-05-09 RX ADMIN — HEPARIN SODIUM 5000 UNITS: 5000 INJECTION INTRAVENOUS; SUBCUTANEOUS at 05:58

## 2024-05-09 ASSESSMENT — PAIN DESCRIPTION - DESCRIPTORS
DESCRIPTORS: ACHING

## 2024-05-09 ASSESSMENT — PAIN SCALES - GENERAL
PAINLEVEL_OUTOF10: 8
PAINLEVEL_OUTOF10: 8
PAINLEVEL_OUTOF10: 6
PAINLEVEL_OUTOF10: 4
PAINLEVEL_OUTOF10: 4
PAINLEVEL_OUTOF10: 2
PAINLEVEL_OUTOF10: 6
PAINLEVEL_OUTOF10: 7
PAINLEVEL_OUTOF10: 8
PAINLEVEL_OUTOF10: 2
PAINLEVEL_OUTOF10: 2
PAINLEVEL_OUTOF10: 6
PAINLEVEL_OUTOF10: 8

## 2024-05-09 ASSESSMENT — PAIN DESCRIPTION - ORIENTATION
ORIENTATION: MID

## 2024-05-09 ASSESSMENT — PAIN - FUNCTIONAL ASSESSMENT
PAIN_FUNCTIONAL_ASSESSMENT: ACTIVITIES ARE NOT PREVENTED

## 2024-05-09 ASSESSMENT — PAIN DESCRIPTION - LOCATION
LOCATION: ABDOMEN

## 2024-05-09 NOTE — CARE COORDINATION
Case Management Assessment  Initial Evaluation    Date/Time of Evaluation: 5/9/2024 3:22 PM  Assessment Completed by: Lea Roman    If patient is discharged prior to next notation, then this note serves as note for discharge by case management.    Patient Name: Prateek Denton II                   YOB: 1960  Diagnosis: Incisional hernia, without obstruction or gangrene [K43.2]  Ventral hernia without obstruction or gangrene [K43.9]                   Date / Time: 5/8/2024  9:11 AM    Patient Admission Status: Inpatient   Readmission Risk (Low < 19, Mod (19-27), High > 27): Readmission Risk Score: 26.1    Current PCP: Lisseth Coello, TORREY - CNP  PCP verified by CM? (P) Yes (TORREY Raymond)    Chart Reviewed: Yes      History Provided by: (P) Patient  Patient Orientation: (P) Alert and Oriented, Person, Place, Situation, Self    Patient Cognition: (P) Alert    Hospitalization in the last 30 days (Readmission):  No    If yes, Readmission Assessment in  Navigator will be completed.    Advance Directives:      Code Status: Full Code   Patient's Primary Decision Maker is: (P) Legal Next of Kin    Primary Decision Maker: Valencia Denton - Spouse - [phone]    Discharge Planning:    Patient lives with: (P) Spouse/Significant Other Type of Home: (P) House  Primary Care Giver: (P) Self  Patient Support Systems include: (P) Spouse/Significant Other, Family Members   Current Financial resources: (P) Other (Comment) (has [employer] [place] insurance)  Current community resources: (P) None  Current services prior to admission: (P) None            Current DME:              Type of Home Care services:  None    ADLS  Prior functional level: (P) Independent in ADLs/IADLs  Current functional level: (P) Assistance with the following:, Cooking, Housework, Bathing, Shopping, Mobility, Dressing, Toileting (needs assist d/t recent surgery)    PT AM-PAC:   /24  OT AM-PAC:   /24    Family can provide assistance at

## 2024-05-09 NOTE — PROGRESS NOTES
General Surgery:  Daily Progress Note                    PATIENT NAME: Prateek Denton II     TODAY'S DATE: 5/9/2024, 7:33 AM  CC:  Doing good    SUBJECTIVE:     Pt seen and examined at bedside. S/p giant ventral hernia repair w/ phasix 5/8. WILD, AVSS, pain controlled w/ meds, drain output light SS, no flatus/BM, voiding without issue.      OBJECTIVE:   VITALS:  BP (!) 160/100   Pulse 87   Temp 98.1 °F (36.7 °C) (Oral)   Resp 17   Ht 1.803 m (5' 11\")   Wt 113.4 kg (250 lb)   SpO2 97%   BMI 34.87 kg/m²      INTAKE/OUTPUT:      Intake/Output Summary (Last 24 hours) at 5/9/2024 0733  Last data filed at 5/9/2024 0724  Gross per 24 hour   Intake 2981.43 ml   Output 995 ml   Net 1986.43 ml       PHYSICAL EXAM:  General Appearance: awake, alert, oriented, in no acute distress  HEENT:  Normocephalic, atraumatic, mucus membranes moist   Heart: Heart regular rate and rhythm  Lungs: No chest wall tenderness., Breathing Pattern: regular, no distress  Abdomen:softly distended, mild ttp around incision site, dressings CDI, LIOR drain in place to bulb suction   Extremities: No cyanosis, pitting edema, rashes noted.    Skin: Skin color, texture, turgor normal. No rashes or lesions.    Data:  CBC with Differential:    Lab Results   Component Value Date/Time    WBC 14.3 05/09/2024 04:41 AM    RBC 3.84 05/09/2024 04:41 AM    HGB 11.0 05/09/2024 04:41 AM    HCT 34.7 05/09/2024 04:41 AM     05/09/2024 04:41 AM    MCV 90.4 05/09/2024 04:41 AM    MCH 28.6 05/09/2024 04:41 AM    MCHC 31.7 05/09/2024 04:41 AM    RDW 14.6 05/09/2024 04:41 AM    LYMPHOPCT 9 05/09/2024 04:41 AM    MONOPCT 9 05/09/2024 04:41 AM    EOSPCT 0 05/09/2024 04:41 AM    BASOPCT 0 05/09/2024 04:41 AM    MONOSABS 1.22 05/09/2024 04:41 AM    LYMPHSABS 1.18 06/26/2023 04:21 AM    EOSABS <0.03 05/09/2024 04:41 AM    BASOSABS 0.04 05/09/2024 04:41 AM    DIFFTYPE NOT REPORTED 01/04/2022 04:12 PM     BMP:    Lab Results   Component Value Date/Time      05/09/2024 04:41 AM    K 5.1 05/09/2024 04:41 AM     05/09/2024 04:41 AM    CO2 22 05/09/2024 04:41 AM    BUN 37 05/09/2024 04:41 AM    LABALBU 100 05/06/2024 05:14 PM    CREATININE 2.9 05/09/2024 04:41 AM    CALCIUM 9.1 05/09/2024 04:41 AM    GFRAA 27.2 05/06/2024 05:14 PM    LABGLOM 24 05/09/2024 04:41 AM    LABGLOM 29 04/29/2024 03:40 PM    GLUCOSE 169 05/09/2024 04:41 AM    GLUCOSE 195 05/06/2024 05:14 PM     Magnesium:    Lab Results   Component Value Date/Time    MG 1.7 05/06/2024 05:14 PM     Phosphorus:    Lab Results   Component Value Date/Time    PHOS 3.3 05/06/2024 05:14 PM       Radiology Review:  No results found.      ASSESSMENT:  Active Hospital Problems    Diagnosis Date Noted    Ventral hernia without obstruction or gangrene [K43.9] 05/08/2024    Incisional hernia [K43.2] 09/24/2021       64 y.o. male s/p ventral hernia repair w/ phasix    Plan:  Diet: Advance to FLD w/ supplements  Analgesia:  MMPT w/ morphine  Bowel regimen:  Colace/Thais/Senna  Drain care every 4 hours  DVT ppx: Hep  Activity:  Continue to encourage ambulation/activity w/ PT/OT  IS and TCDB  Dressings down in AM  Labs/images  F/u daily labs and replace lytes  Dispo: Goal advance diet in AM, monitor drain output, follow up labs, determine d/c in AM vs. weekend    Electronically signed by Seth Costa DO  on 5/9/2024 at 7:33 AM

## 2024-05-10 VITALS
WEIGHT: 250 LBS | HEIGHT: 71 IN | RESPIRATION RATE: 17 BRPM | TEMPERATURE: 98.6 F | HEART RATE: 85 BPM | OXYGEN SATURATION: 95 % | BODY MASS INDEX: 35 KG/M2 | SYSTOLIC BLOOD PRESSURE: 166 MMHG | DIASTOLIC BLOOD PRESSURE: 98 MMHG

## 2024-05-10 LAB
ANION GAP SERPL CALCULATED.3IONS-SCNC: 12 MMOL/L (ref 9–16)
BASOPHILS # BLD: 0.06 K/UL (ref 0–0.2)
BASOPHILS NFR BLD: 1 % (ref 0–2)
BUN SERPL-MCNC: 34 MG/DL (ref 8–23)
CALCIUM SERPL-MCNC: 9.6 MG/DL (ref 8.6–10.4)
CHLORIDE SERPL-SCNC: 101 MMOL/L (ref 98–107)
CO2 SERPL-SCNC: 23 MMOL/L (ref 20–31)
CREAT SERPL-MCNC: 2.8 MG/DL (ref 0.7–1.2)
EOSINOPHIL # BLD: 0.33 K/UL (ref 0–0.44)
EOSINOPHILS RELATIVE PERCENT: 3 % (ref 1–4)
ERYTHROCYTE [DISTWIDTH] IN BLOOD BY AUTOMATED COUNT: 14.5 % (ref 11.8–14.4)
GFR, ESTIMATED: 24 ML/MIN/1.73M2
GLUCOSE SERPL-MCNC: 135 MG/DL (ref 74–99)
HCT VFR BLD AUTO: 41.1 % (ref 40.7–50.3)
HGB BLD-MCNC: 12.7 G/DL (ref 13–17)
IMM GRANULOCYTES # BLD AUTO: 0.05 K/UL (ref 0–0.3)
IMM GRANULOCYTES NFR BLD: 0 %
LYMPHOCYTES NFR BLD: 1.86 K/UL (ref 1.1–3.7)
LYMPHOCYTES RELATIVE PERCENT: 17 % (ref 24–43)
MCH RBC QN AUTO: 28.4 PG (ref 25.2–33.5)
MCHC RBC AUTO-ENTMCNC: 30.9 G/DL (ref 28.4–34.8)
MCV RBC AUTO: 91.9 FL (ref 82.6–102.9)
MONOCYTES NFR BLD: 1.11 K/UL (ref 0.1–1.2)
MONOCYTES NFR BLD: 10 % (ref 3–12)
NEUTROPHILS NFR BLD: 69 % (ref 36–65)
NEUTS SEG NFR BLD: 7.84 K/UL (ref 1.5–8.1)
NRBC BLD-RTO: 0 PER 100 WBC
PLATELET # BLD AUTO: 289 K/UL (ref 138–453)
PMV BLD AUTO: 10 FL (ref 8.1–13.5)
POTASSIUM SERPL-SCNC: 4.5 MMOL/L (ref 3.7–5.3)
RBC # BLD AUTO: 4.47 M/UL (ref 4.21–5.77)
RBC # BLD: ABNORMAL 10*6/UL
SODIUM SERPL-SCNC: 136 MMOL/L (ref 136–145)
WBC OTHER # BLD: 11.3 K/UL (ref 3.5–11.3)

## 2024-05-10 PROCEDURE — 6360000002 HC RX W HCPCS: Performed by: STUDENT IN AN ORGANIZED HEALTH CARE EDUCATION/TRAINING PROGRAM

## 2024-05-10 PROCEDURE — 36415 COLL VENOUS BLD VENIPUNCTURE: CPT

## 2024-05-10 PROCEDURE — 6370000000 HC RX 637 (ALT 250 FOR IP): Performed by: STUDENT IN AN ORGANIZED HEALTH CARE EDUCATION/TRAINING PROGRAM

## 2024-05-10 PROCEDURE — 80048 BASIC METABOLIC PNL TOTAL CA: CPT

## 2024-05-10 PROCEDURE — 85025 COMPLETE CBC W/AUTO DIFF WBC: CPT

## 2024-05-10 RX ORDER — SENNOSIDES A AND B 8.6 MG/1
1 TABLET, FILM COATED ORAL NIGHTLY
Qty: 14 TABLET | Refills: 0 | Status: SHIPPED | OUTPATIENT
Start: 2024-05-10 | End: 2024-05-24

## 2024-05-10 RX ORDER — LISINOPRIL 10 MG/1
5 TABLET ORAL DAILY
Status: DISCONTINUED | OUTPATIENT
Start: 2024-05-10 | End: 2024-05-10 | Stop reason: HOSPADM

## 2024-05-10 RX ORDER — OXYCODONE HYDROCHLORIDE 5 MG/1
5 TABLET ORAL EVERY 6 HOURS PRN
Qty: 28 TABLET | Refills: 0 | Status: SHIPPED | OUTPATIENT
Start: 2024-05-10 | End: 2024-05-17

## 2024-05-10 RX ORDER — METHOCARBAMOL 750 MG/1
750 TABLET, FILM COATED ORAL 4 TIMES DAILY
Qty: 28 TABLET | Refills: 0 | Status: SHIPPED | OUTPATIENT
Start: 2024-05-10 | End: 2024-05-17

## 2024-05-10 RX ADMIN — HEPARIN SODIUM 5000 UNITS: 5000 INJECTION INTRAVENOUS; SUBCUTANEOUS at 05:32

## 2024-05-10 RX ADMIN — PANTOPRAZOLE SODIUM 40 MG: 40 TABLET, DELAYED RELEASE ORAL at 05:28

## 2024-05-10 RX ADMIN — PRAVASTATIN SODIUM 20 MG: 20 TABLET ORAL at 09:13

## 2024-05-10 RX ADMIN — HEPARIN SODIUM 5000 UNITS: 5000 INJECTION INTRAVENOUS; SUBCUTANEOUS at 14:23

## 2024-05-10 RX ADMIN — ACETAMINOPHEN 1000 MG: 500 TABLET ORAL at 13:05

## 2024-05-10 RX ADMIN — POLYETHYLENE GLYCOL 3350 17 G: 17 POWDER, FOR SOLUTION ORAL at 09:09

## 2024-05-10 RX ADMIN — OXYCODONE 5 MG: 5 TABLET ORAL at 09:18

## 2024-05-10 RX ADMIN — METHOCARBAMOL 750 MG: 750 TABLET ORAL at 13:05

## 2024-05-10 RX ADMIN — METOCLOPRAMIDE 5 MG: 5 INJECTION, SOLUTION INTRAMUSCULAR; INTRAVENOUS at 13:05

## 2024-05-10 RX ADMIN — PANTOPRAZOLE SODIUM 40 MG: 40 TABLET, DELAYED RELEASE ORAL at 15:21

## 2024-05-10 RX ADMIN — METOCLOPRAMIDE 5 MG: 5 INJECTION, SOLUTION INTRAMUSCULAR; INTRAVENOUS at 05:30

## 2024-05-10 RX ADMIN — METOPROLOL TARTRATE 100 MG: 50 TABLET, FILM COATED ORAL at 09:09

## 2024-05-10 RX ADMIN — METOCLOPRAMIDE 5 MG: 5 INJECTION, SOLUTION INTRAMUSCULAR; INTRAVENOUS at 00:03

## 2024-05-10 RX ADMIN — METHOCARBAMOL 750 MG: 750 TABLET ORAL at 00:03

## 2024-05-10 RX ADMIN — OXYCODONE 5 MG: 5 TABLET ORAL at 15:26

## 2024-05-10 RX ADMIN — DOCUSATE SODIUM 100 MG: 100 CAPSULE, LIQUID FILLED ORAL at 09:09

## 2024-05-10 RX ADMIN — LISINOPRIL 5 MG: 10 TABLET ORAL at 09:09

## 2024-05-10 RX ADMIN — METHOCARBAMOL 750 MG: 750 TABLET ORAL at 05:28

## 2024-05-10 RX ADMIN — ACETAMINOPHEN 1000 MG: 500 TABLET ORAL at 05:28

## 2024-05-10 ASSESSMENT — PAIN DESCRIPTION - LOCATION
LOCATION: ABDOMEN

## 2024-05-10 ASSESSMENT — PAIN DESCRIPTION - DESCRIPTORS
DESCRIPTORS: ACHING

## 2024-05-10 ASSESSMENT — PAIN SCALES - GENERAL
PAINLEVEL_OUTOF10: 4
PAINLEVEL_OUTOF10: 8
PAINLEVEL_OUTOF10: 8

## 2024-05-10 ASSESSMENT — PAIN DESCRIPTION - ORIENTATION
ORIENTATION: RIGHT;LEFT
ORIENTATION: RIGHT;LEFT

## 2024-05-10 NOTE — DISCHARGE INSTRUCTIONS
Surgery Patient Discharge Instructions    Discharge Date:  5/10/2024    Discharged To:  Home    RESUME ACTIVITY:     WOUND CARE:   Strip LIOR drain twice daily to remove clots from tubing. Empty drain daily or twice daily and keep a log of output   Leave staples in place until office visit.     BATHING:  Ok to shower      DRIVING: No driving for while on pain medication    RETURN TO WORK:   No lifting more than 10 pounds    WALKING:    Yes    LIFTING: Avoid lifting objects heavier than 10 lbs for 8 weeks.    DIET:   Ok to resume regular diet.     SPECIAL INSTRUCTIONS:  After you leave the hospital, call your doctor if any of the following occurs:   Pain or symptoms that worsen   Other new symptoms   Signs of infection, including fever and chills   Nausea and/or vomiting that you can't control with the medications you were given   Pain that you can't control with the medications you've been given   Excessive tenderness or swelling   Changes in bowel or sexual function   Dizziness or lightheadedness   Rash or hives       Watch for signs of infection:    Excessive warmth or bright redness around your incisions    Leakage of bloody or cloudy fluid from you incisions    Fever over 100.5

## 2024-05-10 NOTE — PROGRESS NOTES
Pt discharged home via private vehicle with spouse. IV taken out and discharge instructions given. All questions answered and v/u. Pt and his wife went down to pharmacy to get meds prior to leaving.

## 2024-05-10 NOTE — PROGRESS NOTES
General Surgery:  Daily Progress Note                    PATIENT NAME: Prateek Denton II     TODAY'S DATE: 5/10/2024, 7:58 AM  CC: I feel good    SUBJECTIVE:     Patient was seen evaluated bedside.  Afebrile, vital signs within normal limits.  Patient is passing flatus.  He tolerated the liquid diet.  40 cc of serosanguineous output from the LIOR drain.    OBJECTIVE:   VITALS:  BP (!) 166/98   Pulse 85   Temp 98.6 °F (37 °C) (Oral)   Resp 16   Ht 1.803 m (5' 11\")   Wt 113.4 kg (250 lb)   SpO2 95%   BMI 34.87 kg/m²      INTAKE/OUTPUT:      Intake/Output Summary (Last 24 hours) at 5/10/2024 0758  Last data filed at 5/10/2024 0606  Gross per 24 hour   Intake 2221.49 ml   Output 4190 ml   Net -1968.51 ml         PHYSICAL EXAM:  General Appearance: awake, alert, oriented, in no acute distress  HEENT:  Normocephalic, atraumatic, mucus membranes moist   Heart: Heart regular rate and rhythm  Lungs: Symmetrical rise and fall of chest, normal effort with respirations  Abdomen:softly distended, mild ttp around incision site, dressings CDI, LIOR drain in place to bulb suction midline incision clean dry and intact with surgical staples  Extremities: No cyanosis, pitting edema, rashes noted.    Skin: Skin color, texture, turgor normal. No rashes or lesions.    Data:  CBC with Differential:    Lab Results   Component Value Date/Time    WBC 14.3 05/09/2024 04:41 AM    RBC 3.84 05/09/2024 04:41 AM    HGB 11.0 05/09/2024 04:41 AM    HCT 34.7 05/09/2024 04:41 AM     05/09/2024 04:41 AM    MCV 90.4 05/09/2024 04:41 AM    MCH 28.6 05/09/2024 04:41 AM    MCHC 31.7 05/09/2024 04:41 AM    RDW 14.6 05/09/2024 04:41 AM    LYMPHOPCT 9 05/09/2024 04:41 AM    MONOPCT 9 05/09/2024 04:41 AM    EOSPCT 0 05/09/2024 04:41 AM    BASOPCT 0 05/09/2024 04:41 AM    MONOSABS 1.22 05/09/2024 04:41 AM    LYMPHSABS 1.18 06/26/2023 04:21 AM    EOSABS <0.03 05/09/2024 04:41 AM    BASOSABS 0.04 05/09/2024 04:41 AM    DIFFTYPE NOT REPORTED 01/04/2022

## 2024-05-13 NOTE — DISCHARGE SUMMARY
Surgery Discharge Summary     Patient Identification  Prateek Denton II is a 64 y.o. male.  :  1960  Admit Date:  2024    Discharge date:   5/10/2024  4:26 PM                                   Disposition: home    Discharge Diagnoses:   Patient Active Problem List   Diagnosis    Essential hypertension    CKD (chronic kidney disease) stage 4, GFR 15-29 ml/min (HCC)    Moderate malnutrition (HCC)    Sleep apnea    Incisional hernia    Primary osteoarthritis of right knee    Type 2 diabetes mellitus with diabetic nephropathy, with long-term current use of insulin (HCC)    Acute kidney injury superimposed on CKD (HCC)    Hyperkalemia    Tachycardia    Elevated d-dimer    Paroxysmal A-fib (HCC)    Pneumonia of right lower lobe due to influenza A virus    Sepsis (HCC)    Ileus (HCC)    Pneumonia of right lower lobe due to infectious organism    Chronic diarrhea    Dieulafoy lesion (hemorrhagic) of stomach and duodenum    Esophagitis determined by endoscopy    Dark stools    Hyponatremia    Class 1 obesity with serious comorbidity and body mass index (BMI) of 33.0 to 33.9 in adult    Fatigue due to depression    Ventral hernia without obstruction or gangrene       Condition on discharge: good    Consults: n/a    Surgery: open incisional hernia repair with mesh    Patient Instructions:   Activity: no heavy lifting, pushing, pulling for 8 weeks, no driving for 2 weeks or while on analgesics  Diet: As tolerated  Follow-up with Dr. Doty in 2 weeks.    See pre-printed instructions in chart and given to patient upon discharge.    Discharge Medications:        Medication List        START taking these medications      methocarbamol 750 MG tablet  Commonly known as: ROBAXIN  Take 1 tablet by mouth 4 times daily for 7 days     oxyCODONE 5 MG immediate release tablet  Commonly known as: ROXICODONE  Take 1 tablet by mouth every 6 hours as needed for Pain for up to 7 days. Max Daily Amount: 20 mg     senna 8.6 MG

## 2024-05-30 DIAGNOSIS — E78.1 HYPERTRIGLYCERIDEMIA: ICD-10-CM

## 2024-05-30 RX ORDER — PRAVASTATIN SODIUM 20 MG
TABLET ORAL
Qty: 90 TABLET | Refills: 1 | Status: SHIPPED | OUTPATIENT
Start: 2024-05-30

## 2024-06-03 ENCOUNTER — OFFICE VISIT (OUTPATIENT)
Dept: FAMILY MEDICINE CLINIC | Age: 64
End: 2024-06-03
Payer: COMMERCIAL

## 2024-06-03 ENCOUNTER — NURSE ONLY (OUTPATIENT)
Dept: PRIMARY CARE CLINIC | Age: 64
End: 2024-06-03

## 2024-06-03 VITALS
SYSTOLIC BLOOD PRESSURE: 128 MMHG | OXYGEN SATURATION: 96 % | HEIGHT: 71 IN | TEMPERATURE: 98.1 F | DIASTOLIC BLOOD PRESSURE: 66 MMHG | HEART RATE: 67 BPM | WEIGHT: 248.2 LBS | BODY MASS INDEX: 34.75 KG/M2

## 2024-06-03 DIAGNOSIS — Z00.00 MEDICARE ANNUAL WELLNESS VISIT, SUBSEQUENT: Primary | ICD-10-CM

## 2024-06-03 DIAGNOSIS — E11.21 TYPE 2 DIABETES MELLITUS WITH DIABETIC NEPHROPATHY, WITH LONG-TERM CURRENT USE OF INSULIN (HCC): ICD-10-CM

## 2024-06-03 DIAGNOSIS — Z79.4 TYPE 2 DIABETES MELLITUS WITH DIABETIC NEPHROPATHY, WITH LONG-TERM CURRENT USE OF INSULIN (HCC): ICD-10-CM

## 2024-06-03 DIAGNOSIS — Z12.5 SPECIAL SCREENING FOR MALIGNANT NEOPLASM OF PROSTATE: ICD-10-CM

## 2024-06-03 DIAGNOSIS — Z01.811 PRE-OP CHEST EXAM: Primary | ICD-10-CM

## 2024-06-03 DIAGNOSIS — Z13.220 LIPID SCREENING: ICD-10-CM

## 2024-06-03 PROBLEM — R80.1 PERSISTENT PROTEINURIA, UNSPECIFIED: Status: ACTIVE | Noted: 2024-06-03

## 2024-06-03 PROBLEM — N18.32 STAGE 3B CHRONIC KIDNEY DISEASE (HCC): Status: ACTIVE | Noted: 2024-06-03

## 2024-06-03 PROBLEM — E66.811 CLASS 1 OBESITY WITH SERIOUS COMORBIDITY AND BODY MASS INDEX (BMI) OF 33.0 TO 33.9 IN ADULT: Status: RESOLVED | Noted: 2024-03-22 | Resolved: 2024-06-03

## 2024-06-03 PROBLEM — E55.9 VITAMIN D DEFICIENCY, UNSPECIFIED: Status: ACTIVE | Noted: 2024-06-03

## 2024-06-03 PROBLEM — E87.5 HYPERKALEMIA: Status: RESOLVED | Noted: 2024-02-29 | Resolved: 2024-06-03

## 2024-06-03 PROBLEM — J18.9 PNEUMONIA OF RIGHT LOWER LOBE DUE TO INFECTIOUS ORGANISM: Status: RESOLVED | Noted: 2024-03-12 | Resolved: 2024-06-03

## 2024-06-03 PROBLEM — E66.9 CLASS 1 OBESITY WITH SERIOUS COMORBIDITY AND BODY MASS INDEX (BMI) OF 33.0 TO 33.9 IN ADULT: Status: RESOLVED | Noted: 2024-03-22 | Resolved: 2024-06-03

## 2024-06-03 PROBLEM — F32.A FATIGUE DUE TO DEPRESSION: Status: RESOLVED | Noted: 2024-03-22 | Resolved: 2024-06-03

## 2024-06-03 PROBLEM — E87.1 HYPONATREMIA: Status: RESOLVED | Noted: 2024-03-12 | Resolved: 2024-06-03

## 2024-06-03 PROBLEM — A41.9 SEPSIS (HCC): Status: RESOLVED | Noted: 2024-03-12 | Resolved: 2024-06-03

## 2024-06-03 PROBLEM — R19.5 DARK STOOLS: Status: RESOLVED | Noted: 2024-03-12 | Resolved: 2024-06-03

## 2024-06-03 PROBLEM — R53.83 FATIGUE DUE TO DEPRESSION: Status: RESOLVED | Noted: 2024-03-22 | Resolved: 2024-06-03

## 2024-06-03 PROBLEM — N18.32 STAGE 3B CHRONIC KIDNEY DISEASE (HCC): Status: RESOLVED | Noted: 2024-06-03 | Resolved: 2024-06-03

## 2024-06-03 PROBLEM — R79.89 ELEVATED D-DIMER: Status: RESOLVED | Noted: 2024-02-29 | Resolved: 2024-06-03

## 2024-06-03 LAB
CHOLESTEROL, TOTAL: 150 MG/DL
CHOLESTEROL, TOTAL: 150 MG/DL
CHOLESTEROL/HDL RATIO: 4.7
CHOLESTEROL/HDL RATIO: 4.7
HBA1C MFR BLD: 5.8 %
HDLC SERPL-MCNC: 32 MG/DL
HDLC SERPL-MCNC: 32 MG/DL (ref 35–70)
LDL CHOLESTEROL CALCULATED: 81 MG/DL (ref 0–160)
LDL CHOLESTEROL: 81 MG/DL
NONHDLC SERPL-MCNC: ABNORMAL MG/DL
PROSTATE SPECIFIC ANTIGEN: 0.52 NG/ML
TRIGL SERPL-MCNC: 184 MG/DL
TRIGL SERPL-MCNC: 184 MG/DL
VLDLC SERPL CALC-MCNC: 37 MG/DL
VLDLC SERPL CALC-MCNC: 37 MG/DL

## 2024-06-03 PROCEDURE — 1123F ACP DISCUSS/DSCN MKR DOCD: CPT | Performed by: NURSE PRACTITIONER

## 2024-06-03 PROCEDURE — 3074F SYST BP LT 130 MM HG: CPT | Performed by: NURSE PRACTITIONER

## 2024-06-03 PROCEDURE — 3044F HG A1C LEVEL LT 7.0%: CPT | Performed by: NURSE PRACTITIONER

## 2024-06-03 PROCEDURE — G0439 PPPS, SUBSEQ VISIT: HCPCS | Performed by: NURSE PRACTITIONER

## 2024-06-03 PROCEDURE — 83036 HEMOGLOBIN GLYCOSYLATED A1C: CPT | Performed by: NURSE PRACTITIONER

## 2024-06-03 PROCEDURE — 3017F COLORECTAL CA SCREEN DOC REV: CPT | Performed by: NURSE PRACTITIONER

## 2024-06-03 PROCEDURE — 3078F DIAST BP <80 MM HG: CPT | Performed by: NURSE PRACTITIONER

## 2024-06-03 RX ORDER — BUMETANIDE 1 MG/1
1 TABLET ORAL DAILY
COMMUNITY

## 2024-06-03 SDOH — ECONOMIC STABILITY: FOOD INSECURITY: WITHIN THE PAST 12 MONTHS, YOU WORRIED THAT YOUR FOOD WOULD RUN OUT BEFORE YOU GOT MONEY TO BUY MORE.: NEVER TRUE

## 2024-06-03 SDOH — ECONOMIC STABILITY: FOOD INSECURITY: WITHIN THE PAST 12 MONTHS, THE FOOD YOU BOUGHT JUST DIDN'T LAST AND YOU DIDN'T HAVE MONEY TO GET MORE.: NEVER TRUE

## 2024-06-03 SDOH — ECONOMIC STABILITY: INCOME INSECURITY: HOW HARD IS IT FOR YOU TO PAY FOR THE VERY BASICS LIKE FOOD, HOUSING, MEDICAL CARE, AND HEATING?: NOT HARD AT ALL

## 2024-06-03 ASSESSMENT — PATIENT HEALTH QUESTIONNAIRE - PHQ9
SUM OF ALL RESPONSES TO PHQ QUESTIONS 1-9: 0
SUM OF ALL RESPONSES TO PHQ QUESTIONS 1-9: 0
SUM OF ALL RESPONSES TO PHQ9 QUESTIONS 1 & 2: 0
1. LITTLE INTEREST OR PLEASURE IN DOING THINGS: NOT AT ALL
SUM OF ALL RESPONSES TO PHQ QUESTIONS 1-9: 0
SUM OF ALL RESPONSES TO PHQ QUESTIONS 1-9: 0
2. FEELING DOWN, DEPRESSED OR HOPELESS: NOT AT ALL

## 2024-06-03 NOTE — PROGRESS NOTES
Medicare Annual Wellness Visit    Prateek Denton II is here for Medicare AWV (Having tubes in ears put in.  Taking 2 trazadone to help sleep a couple times a week.) and Diabetes    Assessment & Plan   Medicare annual wellness visit, subsequent  Declines all vaccines due  Cleared for upcoming surgery pending new cxr results today  Patient advised to follow a low carb, low sugar, healthy fat ( avocados, nuts, olive oil etc.) plant based diet and recommend 150 minutes of cardiovascular exercise per week as able and tolerated.  As able  Update lipids/psa  Continues with [facility]x specialists  Continue all same meds    Lipid screening  -     Lipid Panel; Future  Type 2 diabetes mellitus with diabetic nephropathy, with long-term current use of insulin (HCC)  Results for orders placed or performed in visit on 06/03/24   POCT glycosylated hemoglobin (Hb A1C)   Result Value Ref Range    Hemoglobin A1C 5.8 %     Well controlled  Continue diabetic diet/exercise  Monitor feet daily, declines foot exam today  Schd. Diabetic eye exam    -     POCT glycosylated hemoglobin (Hb A1C)  Special screening for malignant neoplasm of prostate  -     PSA Screening; Future    Recommendations for Preventive Services Due: see orders and patient instructions/AVS.  Recommended screening schedule for the next 5-10 years is provided to the patient in written form: see Patient Instructions/AVS.     No follow-ups on file.     Subjective   Pt having ear tube surgery end of June this month per dr hayes. Has blood work to complete for nephrology today. Needs to complete new cxr for pre op clearance.     Patient's complete Health Risk Assessment and screening values have been reviewed and are found in Flowsheets. The following problems were reviewed today and where indicated follow up appointments were made and/or referrals ordered.    Positive Risk Factor Screenings with Interventions:          Drug Use:   Substance and Sexual Activity   Drug Use Yes

## 2024-06-03 NOTE — PATIENT INSTRUCTIONS
Substance Use Disorder: Care Instructions  Overview     You can improve your life and health by stopping your use of alcohol or drugs. When you don't drink or use drugs, you may feel and sleep better. You may get along better with your family, friends, and coworkers. There are medicines and programs that can help with substance use disorder.  How can you care for yourself at home?  Here are some ways to help you stay sober and prevent relapse.  If you have been given medicine to help keep you sober or reduce your cravings, be sure to take it exactly as prescribed.  Talk to your doctor about programs that can help you stop using drugs or drinking alcohol.  Do not keep alcohol or drugs in your home.  Plan ahead. Think about what you'll say if other people ask you to drink or use drugs. Try not to spend time with people who drink or use drugs.  Use the time and money spent on drinking or drugs to do something that's important to you.  Preventing a relapse  Have a plan to deal with relapse. Learn to recognize changes in your thinking that lead you to drink or use drugs. Get help before you start to drink or use drugs again.  Try to stay away from situations, friends, or places that may lead you to drink or use drugs.  If you feel the need to drink alcohol or use drugs again, seek help right away. Call a trusted friend or family member. Some people get support from organizations such as Narcotics Anonymous or [employer] or from treatment facilities.  If you relapse, get help as soon as you can. Some people make a plan with another person that outlines what they want that person to do for them if they relapse. The plan usually includes how to handle the relapse and who to notify in case of relapse.  Don't give up. Remember that a relapse doesn't mean that you have failed. Use the experience to learn the triggers that lead you to drink or use drugs. Then quit again. Recovery is a lifelong process. Many people have  Group Therapy Documentation    PATIENT'S NAME: Philipp Brown  MRN:   [id number]  :   2009  ACCT. NUMBER: [id number]  DATE OF SERVICE: 21  START TIME: 12:00 PM  END TIME:  1:00 PM  FACILITATOR(S): Digna Pacheco  TOPIC: Child/Adol Group Therapy  Number of patients attending the group:  6  Group Length:  1 Hour    Summary of Group / Topics Discussed:    ** RESILIENCY GROUP **    ACTIVITY:   Group members played gamed called 'Picture Phone.'  Where one group member looks at a magazine picture, draws it, then passes that drawing to the next player and they draw it and so on.  Final products are handed to player #1 to see how the picture has changed with different players perspectives and not being able to see the original picture.    OBJECTIVES:     Gain understanding of the difficulty of communication    Learn how to communicate your thoughts effectively    Identify areas where communication can be misguided    Discuss how perspectives and individuals differ    BRENDA Baker      Group Attendance:  Attended group session    Patient's response to the group topic/interactions:  cooperative with task    Patient appeared to be Actively participating.       Client specific details:  See above.

## 2024-06-03 NOTE — PROGRESS NOTES
Visit Information    Have you changed or started any medications since your last visit including any over-the-counter medicines, vitamins, or herbal medicines? no   Have you stopped taking any of your medications? Is so, why? -  no  Are you having any side effects from any of your medications? - no    Have you seen any other physician or provider since your last visit?  no   Have you had any other diagnostic tests since your last visit?  no   Have you been seen in the emergency room and/or had an admission in a hospital since we last saw you?  no   Have you had your routine dental cleaning in the past 6 months?  no     Do you have an active MyChart account? If no, what is the barrier?  Yes    Patient Care Team:  Lisseth Coello APRN - CNP as PCP - General (Family Nurse Practitioner)  Lisseth Coello APRN - CNP as PCP - Empaneled Provider  Darleen Johnson MD as Consulting Physician (Gastroenterology)    Medical History Review  Past Medical, Family, and Social History reviewed and  contribute to the patient presenting condition    Health Maintenance   Topic Date Due    COVID-19 Vaccine (1) Never done    Diabetic retinal exam  Never done    DTaP/Tdap/Td vaccine (1 - Tdap) Never done    Shingles vaccine (1 of 2) Never done    Pneumococcal 65+ years Vaccine (2 of 2 - PCV) 12/12/2017    Respiratory Syncytial Virus (RSV) Pregnant or age 60 yrs+ (1 - 1-dose 60+ series) Never done    Diabetic foot exam  02/01/2022    Lipids  12/19/2023    Flu vaccine (Season Ended) 08/01/2024    A1C test (Diabetic or Prediabetic)  02/07/2025    Depression Screen  02/27/2025    Diabetic Alb to Cr ratio (uACR) test  05/06/2025    GFR test (Diabetes, CKD 3-4, OR last GFR 15-59)  05/10/2025    Colorectal Cancer Screen  04/18/2033    Hepatitis C screen  Completed    Hepatitis A vaccine  Aged Out    Hepatitis B vaccine  Aged Out    Hib vaccine  Aged Out    Polio vaccine  Aged Out    Meningococcal (ACWY) vaccine  Aged Out    Pneumococcal 0-64

## 2024-06-06 DIAGNOSIS — Z12.5 SPECIAL SCREENING FOR MALIGNANT NEOPLASM OF PROSTATE: ICD-10-CM

## 2024-06-06 DIAGNOSIS — Z13.220 LIPID SCREENING: ICD-10-CM

## 2024-06-16 RX ORDER — BLOOD-GLUCOSE SENSOR
EACH MISCELLANEOUS
Qty: 2 EACH | Refills: 5 | Status: SHIPPED | OUTPATIENT
Start: 2024-06-16

## 2024-06-17 ENCOUNTER — PATIENT MESSAGE (OUTPATIENT)
Dept: FAMILY MEDICINE CLINIC | Age: 64
End: 2024-06-17

## 2024-06-18 RX ORDER — DULOXETIN HYDROCHLORIDE 30 MG/1
30 CAPSULE, DELAYED RELEASE ORAL DAILY
Qty: 90 CAPSULE | Refills: 2 | Status: SHIPPED | OUTPATIENT
Start: 2024-06-18 | End: 2024-09-16

## 2024-06-18 NOTE — TELEPHONE ENCOUNTER
From: Prateek Denton II  To: Lisseth Coello  Sent: 6/17/2024 11:19 PM EDT  Subject: Refill    Lisseth bosch need a refill of duloxetine 30 mg 90 days at [facility]

## 2024-09-04 DIAGNOSIS — G47.00 INSOMNIA, UNSPECIFIED TYPE: ICD-10-CM

## 2024-09-04 DIAGNOSIS — E78.1 HYPERTRIGLYCERIDEMIA: ICD-10-CM

## 2024-09-04 RX ORDER — PRAVASTATIN SODIUM 20 MG
TABLET ORAL
Qty: 90 TABLET | Refills: 1 | Status: SHIPPED | OUTPATIENT
Start: 2024-09-04

## 2024-09-04 RX ORDER — HYDROXYZINE HYDROCHLORIDE 50 MG/1
TABLET, FILM COATED ORAL
Qty: 90 TABLET | Refills: 3 | Status: SHIPPED | OUTPATIENT
Start: 2024-09-04

## 2024-09-12 DIAGNOSIS — F51.02 ADJUSTMENT INSOMNIA: ICD-10-CM

## 2024-09-12 RX ORDER — TRAZODONE HYDROCHLORIDE 50 MG/1
50 TABLET, FILM COATED ORAL NIGHTLY
Qty: 90 TABLET | Refills: 1 | Status: SHIPPED | OUTPATIENT
Start: 2024-09-12

## 2024-09-14 DIAGNOSIS — J98.01 BRONCHOSPASM: ICD-10-CM

## 2024-09-14 DIAGNOSIS — R06.02 SOB (SHORTNESS OF BREATH): ICD-10-CM

## 2024-09-14 DIAGNOSIS — J40 BRONCHITIS: ICD-10-CM

## 2024-09-16 RX ORDER — ALBUTEROL SULFATE 90 UG/1
2 INHALANT RESPIRATORY (INHALATION) 4 TIMES DAILY PRN
Qty: 6.7 EACH | Refills: 0 | Status: SHIPPED | OUTPATIENT
Start: 2024-09-16

## 2024-10-10 ENCOUNTER — HOSPITAL ENCOUNTER (INPATIENT)
Age: 64
LOS: 2 days | Discharge: HOME | End: 2024-10-12
Attending: EMERGENCY MEDICINE | Admitting: INTERNAL MEDICINE
Payer: COMMERCIAL

## 2024-10-10 ENCOUNTER — APPOINTMENT (OUTPATIENT)
Dept: GENERAL RADIOLOGY | Age: 64
End: 2024-10-10
Payer: COMMERCIAL

## 2024-10-10 DIAGNOSIS — E87.5 HYPERKALEMIA: ICD-10-CM

## 2024-10-10 DIAGNOSIS — N18.9 CHRONIC KIDNEY DISEASE, UNSPECIFIED CKD STAGE: Primary | ICD-10-CM

## 2024-10-10 PROBLEM — E83.42 HYPOMAGNESEMIA: Status: ACTIVE | Noted: 2024-10-10

## 2024-10-10 PROBLEM — D68.69 SECONDARY HYPERCOAGULABLE STATE (HCC): Status: ACTIVE | Noted: 2024-10-10

## 2024-10-10 LAB
ANION GAP SERPL CALCULATED.3IONS-SCNC: 11 MMOL/L (ref 9–17)
ANION GAP SERPL CALCULATED.3IONS-SCNC: 12 MMOL/L (ref 9–17)
BASOPHILS # BLD: 0.07 K/UL (ref 0–0.2)
BASOPHILS NFR BLD: 1 % (ref 0–2)
BILIRUB UR QL STRIP: NEGATIVE
BUN SERPL-MCNC: 72 MG/DL (ref 8–23)
BUN SERPL-MCNC: 72 MG/DL (ref 8–23)
BUN/CREAT SERPL: 19 (ref 9–20)
BUN/CREAT SERPL: 20 (ref 9–20)
CALCIUM SERPL-MCNC: 8.8 MG/DL (ref 8.6–10.4)
CALCIUM SERPL-MCNC: 9 MG/DL (ref 8.6–10.4)
CHLORIDE SERPL-SCNC: 109 MMOL/L (ref 98–107)
CHLORIDE SERPL-SCNC: 110 MMOL/L (ref 98–107)
CLARITY UR: CLEAR
CO2 SERPL-SCNC: 14 MMOL/L (ref 20–31)
CO2 SERPL-SCNC: 17 MMOL/L (ref 20–31)
COLOR UR: YELLOW
CREAT SERPL-MCNC: 3.6 MG/DL (ref 0.7–1.2)
CREAT SERPL-MCNC: 3.8 MG/DL (ref 0.7–1.2)
EOSINOPHIL # BLD: 0.49 K/UL (ref 0–0.44)
EOSINOPHILS RELATIVE PERCENT: 4 % (ref 1–4)
EPI CELLS #/AREA URNS HPF: ABNORMAL /HPF (ref 0–5)
ERYTHROCYTE [DISTWIDTH] IN BLOOD BY AUTOMATED COUNT: 13.6 % (ref 11.8–14.4)
EST. AVERAGE GLUCOSE BLD GHB EST-MCNC: 131 MG/DL
GFR, ESTIMATED: 17 ML/MIN/1.73M2
GFR, ESTIMATED: 18 ML/MIN/1.73M2
GLUCOSE BLD-MCNC: 103 MG/DL (ref 75–110)
GLUCOSE BLD-MCNC: 112 MG/DL (ref 75–110)
GLUCOSE BLD-MCNC: 120 MG/DL (ref 75–110)
GLUCOSE SERPL-MCNC: 106 MG/DL (ref 70–99)
GLUCOSE SERPL-MCNC: 157 MG/DL (ref 70–99)
GLUCOSE UR STRIP-MCNC: ABNORMAL MG/DL
HBA1C MFR BLD: 6.2 % (ref 4–6)
HCT VFR BLD AUTO: 37.4 % (ref 40.7–50.3)
HGB BLD-MCNC: 12.3 G/DL (ref 13–17)
HGB UR QL STRIP.AUTO: ABNORMAL
IMM GRANULOCYTES # BLD AUTO: 0.19 K/UL (ref 0–0.3)
IMM GRANULOCYTES NFR BLD: 2 %
KETONES UR STRIP-MCNC: NEGATIVE MG/DL
LEUKOCYTE ESTERASE UR QL STRIP: NEGATIVE
LYMPHOCYTES NFR BLD: 2 K/UL (ref 1.1–3.7)
LYMPHOCYTES RELATIVE PERCENT: 16 % (ref 24–43)
MAGNESIUM SERPL-MCNC: 1.4 MG/DL (ref 1.6–2.6)
MCH RBC QN AUTO: 30 PG (ref 25.2–33.5)
MCHC RBC AUTO-ENTMCNC: 32.9 G/DL (ref 28.4–34.8)
MCV RBC AUTO: 91.2 FL (ref 82.6–102.9)
MONOCYTES NFR BLD: 0.82 K/UL (ref 0.1–1.2)
MONOCYTES NFR BLD: 7 % (ref 3–12)
NEUTROPHILS NFR BLD: 70 % (ref 36–65)
NEUTS SEG NFR BLD: 8.61 K/UL (ref 1.5–8.1)
NITRITE UR QL STRIP: NEGATIVE
NRBC BLD-RTO: 0 PER 100 WBC
OSMOLALITY UR: 526 MOSM/KG (ref 80–1300)
PH UR STRIP: 6 [PH] (ref 5–8)
PLATELET # BLD AUTO: 221 K/UL (ref 138–453)
PMV BLD AUTO: 10.3 FL (ref 8.1–13.5)
POTASSIUM SERPL-SCNC: 6.1 MMOL/L (ref 3.7–5.3)
POTASSIUM SERPL-SCNC: 6.5 MMOL/L (ref 3.7–5.3)
PROT UR STRIP-MCNC: ABNORMAL MG/DL
RBC # BLD AUTO: 4.1 M/UL (ref 4.21–5.77)
RBC #/AREA URNS HPF: ABNORMAL /HPF (ref 0–2)
SODIUM SERPL-SCNC: 135 MMOL/L (ref 135–144)
SODIUM SERPL-SCNC: 138 MMOL/L (ref 135–144)
SODIUM UR-SCNC: 108 MMOL/L
SP GR UR STRIP: 1.02 (ref 1–1.03)
TOTAL PROTEIN, URINE: 174 MG/DL
TROPONIN I SERPL HS-MCNC: 55 NG/L (ref 0–22)
TROPONIN I SERPL HS-MCNC: 65 NG/L (ref 0–22)
UROBILINOGEN UR STRIP-ACNC: NORMAL EU/DL (ref 0–1)
WBC #/AREA URNS HPF: ABNORMAL /HPF (ref 0–5)
WBC OTHER # BLD: 12.2 K/UL (ref 3.5–11.3)

## 2024-10-10 PROCEDURE — 2500000003 HC RX 250 WO HCPCS

## 2024-10-10 PROCEDURE — 6370000000 HC RX 637 (ALT 250 FOR IP): Performed by: NURSE PRACTITIONER

## 2024-10-10 PROCEDURE — 2580000003 HC RX 258: Performed by: NURSE PRACTITIONER

## 2024-10-10 PROCEDURE — 81001 URINALYSIS AUTO W/SCOPE: CPT

## 2024-10-10 PROCEDURE — 93005 ELECTROCARDIOGRAM TRACING: CPT

## 2024-10-10 PROCEDURE — 6370000000 HC RX 637 (ALT 250 FOR IP)

## 2024-10-10 PROCEDURE — 82947 ASSAY GLUCOSE BLOOD QUANT: CPT

## 2024-10-10 PROCEDURE — 2000000000 HC ICU R&B

## 2024-10-10 PROCEDURE — 99223 1ST HOSP IP/OBS HIGH 75: CPT

## 2024-10-10 PROCEDURE — 2580000003 HC RX 258

## 2024-10-10 PROCEDURE — 96375 TX/PRO/DX INJ NEW DRUG ADDON: CPT

## 2024-10-10 PROCEDURE — 6360000002 HC RX W HCPCS

## 2024-10-10 PROCEDURE — 99285 EMERGENCY DEPT VISIT HI MDM: CPT

## 2024-10-10 PROCEDURE — 84156 ASSAY OF PROTEIN URINE: CPT

## 2024-10-10 PROCEDURE — 71046 X-RAY EXAM CHEST 2 VIEWS: CPT

## 2024-10-10 PROCEDURE — 6370000000 HC RX 637 (ALT 250 FOR IP): Performed by: INTERNAL MEDICINE

## 2024-10-10 PROCEDURE — 84484 ASSAY OF TROPONIN QUANT: CPT

## 2024-10-10 PROCEDURE — 83036 HEMOGLOBIN GLYCOSYLATED A1C: CPT

## 2024-10-10 PROCEDURE — 85025 COMPLETE CBC W/AUTO DIFF WBC: CPT

## 2024-10-10 PROCEDURE — 83935 ASSAY OF URINE OSMOLALITY: CPT

## 2024-10-10 PROCEDURE — 96374 THER/PROPH/DIAG INJ IV PUSH: CPT

## 2024-10-10 PROCEDURE — 84300 ASSAY OF URINE SODIUM: CPT

## 2024-10-10 PROCEDURE — 6360000002 HC RX W HCPCS: Performed by: EMERGENCY MEDICINE

## 2024-10-10 PROCEDURE — 93005 ELECTROCARDIOGRAM TRACING: CPT | Performed by: EMERGENCY MEDICINE

## 2024-10-10 PROCEDURE — 6370000000 HC RX 637 (ALT 250 FOR IP): Performed by: EMERGENCY MEDICINE

## 2024-10-10 PROCEDURE — 80048 BASIC METABOLIC PNL TOTAL CA: CPT

## 2024-10-10 PROCEDURE — 83735 ASSAY OF MAGNESIUM: CPT

## 2024-10-10 PROCEDURE — 2500000003 HC RX 250 WO HCPCS: Performed by: EMERGENCY MEDICINE

## 2024-10-10 RX ORDER — LISINOPRIL 5 MG/1
5 TABLET ORAL DAILY
Status: DISCONTINUED | OUTPATIENT
Start: 2024-10-10 | End: 2024-10-12

## 2024-10-10 RX ORDER — SODIUM POLYSTYRENE SULFONATE 15 G/60ML
30 SUSPENSION ORAL; RECTAL ONCE
Status: COMPLETED | OUTPATIENT
Start: 2024-10-10 | End: 2024-10-10

## 2024-10-10 RX ORDER — SODIUM CHLORIDE 9 MG/ML
INJECTION, SOLUTION INTRAVENOUS CONTINUOUS
Status: DISCONTINUED | OUTPATIENT
Start: 2024-10-10 | End: 2024-10-10

## 2024-10-10 RX ORDER — INSULIN LISPRO 100 [IU]/ML
0-8 INJECTION, SOLUTION INTRAVENOUS; SUBCUTANEOUS
Status: DISCONTINUED | OUTPATIENT
Start: 2024-10-10 | End: 2024-10-12 | Stop reason: HOSPADM

## 2024-10-10 RX ORDER — ACETAMINOPHEN 650 MG/1
650 SUPPOSITORY RECTAL EVERY 6 HOURS PRN
Status: DISCONTINUED | OUTPATIENT
Start: 2024-10-10 | End: 2024-10-12 | Stop reason: HOSPADM

## 2024-10-10 RX ORDER — TRAZODONE HYDROCHLORIDE 50 MG/1
50 TABLET, FILM COATED ORAL NIGHTLY
Status: DISCONTINUED | OUTPATIENT
Start: 2024-10-10 | End: 2024-10-12 | Stop reason: HOSPADM

## 2024-10-10 RX ORDER — SODIUM BICARBONATE 650 MG/1
650 TABLET ORAL 3 TIMES DAILY
Status: DISCONTINUED | OUTPATIENT
Start: 2024-10-10 | End: 2024-10-10 | Stop reason: DRUGHIGH

## 2024-10-10 RX ORDER — BUMETANIDE 1 MG/1
1 TABLET ORAL DAILY
Status: DISCONTINUED | OUTPATIENT
Start: 2024-10-10 | End: 2024-10-12

## 2024-10-10 RX ORDER — LOPERAMIDE HCL 2 MG
2 CAPSULE ORAL 4 TIMES DAILY PRN
Status: DISCONTINUED | OUTPATIENT
Start: 2024-10-10 | End: 2024-10-12 | Stop reason: HOSPADM

## 2024-10-10 RX ORDER — AZELASTINE 1 MG/ML
2 SPRAY, METERED NASAL 2 TIMES DAILY
Status: DISCONTINUED | OUTPATIENT
Start: 2024-10-10 | End: 2024-10-12 | Stop reason: HOSPADM

## 2024-10-10 RX ORDER — CALCIUM GLUCONATE 20 MG/ML
1000 INJECTION, SOLUTION INTRAVENOUS ONCE
Status: COMPLETED | OUTPATIENT
Start: 2024-10-10 | End: 2024-10-10

## 2024-10-10 RX ORDER — METOPROLOL TARTRATE 50 MG
100 TABLET ORAL 2 TIMES DAILY
Status: DISCONTINUED | OUTPATIENT
Start: 2024-10-10 | End: 2024-10-12 | Stop reason: HOSPADM

## 2024-10-10 RX ORDER — ACETAMINOPHEN 325 MG/1
650 TABLET ORAL EVERY 6 HOURS PRN
Status: DISCONTINUED | OUTPATIENT
Start: 2024-10-10 | End: 2024-10-12 | Stop reason: HOSPADM

## 2024-10-10 RX ORDER — DEXTROSE MONOHYDRATE 100 MG/ML
INJECTION, SOLUTION INTRAVENOUS CONTINUOUS PRN
Status: DISCONTINUED | OUTPATIENT
Start: 2024-10-10 | End: 2024-10-12 | Stop reason: HOSPADM

## 2024-10-10 RX ORDER — HYDRALAZINE HYDROCHLORIDE 20 MG/ML
10 INJECTION INTRAMUSCULAR; INTRAVENOUS EVERY 6 HOURS PRN
Status: DISCONTINUED | OUTPATIENT
Start: 2024-10-10 | End: 2024-10-12 | Stop reason: HOSPADM

## 2024-10-10 RX ORDER — LANOLIN ALCOHOL/MO/W.PET/CERES
9 CREAM (GRAM) TOPICAL NIGHTLY PRN
Status: DISCONTINUED | OUTPATIENT
Start: 2024-10-10 | End: 2024-10-12 | Stop reason: HOSPADM

## 2024-10-10 RX ORDER — FERROUS SULFATE 325(65) MG
325 TABLET, DELAYED RELEASE (ENTERIC COATED) ORAL DAILY
Status: DISCONTINUED | OUTPATIENT
Start: 2024-10-10 | End: 2024-10-12

## 2024-10-10 RX ORDER — VITAMIN B COMPLEX
1000 TABLET ORAL DAILY
Status: DISCONTINUED | OUTPATIENT
Start: 2024-10-10 | End: 2024-10-12 | Stop reason: HOSPADM

## 2024-10-10 RX ORDER — HYDROXYZINE HYDROCHLORIDE 25 MG/1
50 TABLET, FILM COATED ORAL NIGHTLY
Status: DISCONTINUED | OUTPATIENT
Start: 2024-10-10 | End: 2024-10-12 | Stop reason: HOSPADM

## 2024-10-10 RX ORDER — HEPARIN SODIUM 5000 [USP'U]/ML
5000 INJECTION, SOLUTION INTRAVENOUS; SUBCUTANEOUS EVERY 8 HOURS SCHEDULED
Status: DISCONTINUED | OUTPATIENT
Start: 2024-10-11 | End: 2024-10-12 | Stop reason: HOSPADM

## 2024-10-10 RX ORDER — MAGNESIUM SULFATE IN WATER 40 MG/ML
2000 INJECTION, SOLUTION INTRAVENOUS ONCE
Status: COMPLETED | OUTPATIENT
Start: 2024-10-10 | End: 2024-10-10

## 2024-10-10 RX ORDER — ONDANSETRON 2 MG/ML
4 INJECTION INTRAMUSCULAR; INTRAVENOUS EVERY 6 HOURS PRN
Status: DISCONTINUED | OUTPATIENT
Start: 2024-10-10 | End: 2024-10-12 | Stop reason: HOSPADM

## 2024-10-10 RX ORDER — SODIUM POLYSTYRENE SULFONATE 4.1 MEQ/G
45 POWDER, FOR SUSPENSION ORAL; RECTAL ONCE
Status: DISCONTINUED | OUTPATIENT
Start: 2024-10-10 | End: 2024-10-10

## 2024-10-10 RX ORDER — SODIUM CHLORIDE 0.9 % (FLUSH) 0.9 %
5-40 SYRINGE (ML) INJECTION PRN
Status: DISCONTINUED | OUTPATIENT
Start: 2024-10-10 | End: 2024-10-12 | Stop reason: HOSPADM

## 2024-10-10 RX ORDER — ONDANSETRON 4 MG/1
4 TABLET, ORALLY DISINTEGRATING ORAL EVERY 8 HOURS PRN
Status: DISCONTINUED | OUTPATIENT
Start: 2024-10-10 | End: 2024-10-12 | Stop reason: HOSPADM

## 2024-10-10 RX ORDER — ALBUTEROL SULFATE 90 UG/1
2 INHALANT RESPIRATORY (INHALATION) 4 TIMES DAILY PRN
Status: DISCONTINUED | OUTPATIENT
Start: 2024-10-10 | End: 2024-10-12 | Stop reason: HOSPADM

## 2024-10-10 RX ORDER — PRAVASTATIN SODIUM 20 MG
20 TABLET ORAL DAILY
Status: DISCONTINUED | OUTPATIENT
Start: 2024-10-10 | End: 2024-10-12 | Stop reason: HOSPADM

## 2024-10-10 RX ORDER — DEXTROSE MONOHYDRATE 25 G/50ML
25 INJECTION, SOLUTION INTRAVENOUS ONCE
Status: COMPLETED | OUTPATIENT
Start: 2024-10-10 | End: 2024-10-10

## 2024-10-10 RX ORDER — SODIUM BICARBONATE 650 MG/1
1300 TABLET ORAL 3 TIMES DAILY
Status: DISCONTINUED | OUTPATIENT
Start: 2024-10-10 | End: 2024-10-12 | Stop reason: HOSPADM

## 2024-10-10 RX ORDER — DULOXETIN HYDROCHLORIDE 30 MG/1
30 CAPSULE, DELAYED RELEASE ORAL DAILY
Status: DISCONTINUED | OUTPATIENT
Start: 2024-10-10 | End: 2024-10-12 | Stop reason: HOSPADM

## 2024-10-10 RX ORDER — PANTOPRAZOLE SODIUM 40 MG/1
40 TABLET, DELAYED RELEASE ORAL
Status: DISCONTINUED | OUTPATIENT
Start: 2024-10-11 | End: 2024-10-12

## 2024-10-10 RX ORDER — SODIUM CHLORIDE 9 MG/ML
INJECTION, SOLUTION INTRAVENOUS PRN
Status: DISCONTINUED | OUTPATIENT
Start: 2024-10-10 | End: 2024-10-12 | Stop reason: HOSPADM

## 2024-10-10 RX ORDER — SODIUM POLYSTYRENE SULFONATE 15 G/60ML
45 SUSPENSION ORAL; RECTAL ONCE
Status: COMPLETED | OUTPATIENT
Start: 2024-10-10 | End: 2024-10-10

## 2024-10-10 RX ORDER — SODIUM CHLORIDE 0.9 % (FLUSH) 0.9 %
5-40 SYRINGE (ML) INJECTION EVERY 12 HOURS SCHEDULED
Status: DISCONTINUED | OUTPATIENT
Start: 2024-10-10 | End: 2024-10-12 | Stop reason: HOSPADM

## 2024-10-10 RX ORDER — FOLIC ACID/VIT B COMPLEX AND C 0.8 MG
1 TABLET ORAL DAILY
Status: DISCONTINUED | OUTPATIENT
Start: 2024-10-10 | End: 2024-10-12

## 2024-10-10 RX ADMIN — SODIUM CHLORIDE: 9 INJECTION, SOLUTION INTRAVENOUS at 20:49

## 2024-10-10 RX ADMIN — SODIUM POLYSTYRENE SULFONATE 30 G: 15 SUSPENSION ORAL; RECTAL at 21:52

## 2024-10-10 RX ADMIN — DEXTROSE MONOHYDRATE 250 ML: 100 INJECTION, SOLUTION INTRAVENOUS at 21:22

## 2024-10-10 RX ADMIN — SODIUM BICARBONATE: 84 INJECTION, SOLUTION INTRAVENOUS at 23:59

## 2024-10-10 RX ADMIN — SODIUM BICARBONATE 1300 MG: 650 TABLET ORAL at 21:21

## 2024-10-10 RX ADMIN — Medication 1000 UNITS: at 21:20

## 2024-10-10 RX ADMIN — SODIUM POLYSTYRENE SULFONATE 45 G: 15 SUSPENSION ORAL; RECTAL at 15:46

## 2024-10-10 RX ADMIN — MAGNESIUM SULFATE HEPTAHYDRATE 2000 MG: 40 INJECTION, SOLUTION INTRAVENOUS at 21:46

## 2024-10-10 RX ADMIN — DEXTROSE MONOHYDRATE 25 G: 25 INJECTION, SOLUTION INTRAVENOUS at 15:48

## 2024-10-10 RX ADMIN — FERROUS SULFATE TAB EC 325 MG (65 MG FE EQUIVALENT) 325 MG: 325 (65 FE) TABLET DELAYED RESPONSE at 18:57

## 2024-10-10 RX ADMIN — SODIUM ZIRCONIUM CYCLOSILICATE 10 G: 10 POWDER, FOR SUSPENSION ORAL at 21:39

## 2024-10-10 RX ADMIN — SODIUM CHLORIDE, PRESERVATIVE FREE 10 ML: 5 INJECTION INTRAVENOUS at 20:49

## 2024-10-10 RX ADMIN — TRAZODONE HYDROCHLORIDE 50 MG: 50 TABLET ORAL at 21:20

## 2024-10-10 RX ADMIN — INSULIN HUMAN 10 UNITS: 100 INJECTION, SOLUTION PARENTERAL at 21:21

## 2024-10-10 RX ADMIN — METOPROLOL TARTRATE 100 MG: 50 TABLET, FILM COATED ORAL at 21:21

## 2024-10-10 RX ADMIN — DULOXETINE HYDROCHLORIDE 30 MG: 30 CAPSULE, DELAYED RELEASE ORAL at 18:57

## 2024-10-10 RX ADMIN — INSULIN HUMAN 10 UNITS: 100 INJECTION, SOLUTION PARENTERAL at 15:45

## 2024-10-10 RX ADMIN — CALCIUM GLUCONATE 1000 MG: 20 INJECTION, SOLUTION INTRAVENOUS at 15:46

## 2024-10-10 RX ADMIN — HYDROXYZINE HYDROCHLORIDE 50 MG: 25 TABLET ORAL at 21:20

## 2024-10-10 RX ADMIN — PRAVASTATIN SODIUM 20 MG: 20 TABLET ORAL at 21:20

## 2024-10-10 RX ADMIN — Medication 1 TABLET: at 18:57

## 2024-10-10 RX ADMIN — SODIUM BICARBONATE 1300 MG: 650 TABLET ORAL at 18:57

## 2024-10-10 ASSESSMENT — ENCOUNTER SYMPTOMS
SORE THROAT: 0
WHEEZING: 0
SHORTNESS OF BREATH: 0
NAUSEA: 0
CONSTIPATION: 0
EYE REDNESS: 0
ABDOMINAL PAIN: 0

## 2024-10-10 ASSESSMENT — PAIN - FUNCTIONAL ASSESSMENT
PAIN_FUNCTIONAL_ASSESSMENT: NONE - DENIES PAIN
PAIN_FUNCTIONAL_ASSESSMENT: NONE - DENIES PAIN

## 2024-10-10 ASSESSMENT — PAIN SCALES - GENERAL: PAINLEVEL_OUTOF10: 0

## 2024-10-10 NOTE — ED PROVIDER NOTES
[facility] ED  eMERGENCY dEPARTMENT eNCOUnter    Pt Name: Prateek Denton II  MRN: [id number]  Birthdate 1960  Date of evaluation: 10/10/24  CHIEF COMPLAINT       Chief Complaint   Patient presents with    Labs Only     PCP told him he had abnormal kidney lab values     HISTORY OF PRESENT ILLNESS   HPI  Patient is a 64-year-old male with a history of chronic any disease who presents to the emergency room with complaints of elevated creatinine and potassium.  Patient had labs drawn yesterday and was informed today that his creatinine and potassium were elevated.  Patient missed the shortness of breath which he has had chronically since August.  Patient denies any fevers chills chest pain abdominal pain nausea vomiting diarrhea constipation.    REVIEW OF SYSTEMS     Constitutional: No fever  Eye: No visual changes  Ear/Nose/Mouth/Throat: No sore throat  Respiratory: Shortness of breath  Cardiovascular: No chest pain  Gastrointestinal: No nausea, no vomiting, no diarrhea  Genitourinary: No dysuria  Musculoskeletal: No joint pain  Integumentary: No rash  Neurologic: No dizziness  Psychiatric: No anxiety, no depression  All systems otherwise negative.        PAST MEDICAL HISTORY     Past Medical History:   Diagnosis Date    Acute blood loss anemia 01/05/2022    Arthritis     BILATERAL KNEESand right shoulder    Back pain     Cellulitis 07/14/2015    CKD (chronic kidney disease) stage 3, GFR 30-59 ml/min ([facility])     Dr. [facility] last visit 4/2024    Colostomy RUQ 09/23/2020    Frequent headaches     10/28/2020 PATIENT STATES EVERY OTHER DAY.    Gastrointestinal hemorrhage with melena 01/05/2022    Gout     Hemorrhagic shock (HCC) 06/21/2023    Hernia of abdominal wall     Dr. Doty    History of atrial fibrillation     AFTER SURGERY ON 09/23/2020 WENT INTO A FIB. CONVERTED BACK TO NORMAL RHYTHM ON HIS OWN. CARDIOLOGIST DR STEIN    History of blood transfusion     NO REACTION    [employer] (hard of

## 2024-10-10 NOTE — ED NOTES
Pt ambulated to br with steady gait.  Urine sample received labeled and sent to lab.   Dinner tray given to pt.

## 2024-10-10 NOTE — ED NOTES
ED TO INPATIENT SBAR HANDOFF    Patient Name: Prateek Denton II   :  1960  64 y.o.   MRN:  [id number]  Preferred Name  Andrew  ED Room #:  [facility]10/10  Family/Caregiver Present no   Restraints no   Sitter no   Sepsis Risk Score      Situation  Code Status: Prior No additional code details.    Allergies: Ampicillin, Pcn [penicillins], Sulfa antibiotics, and Tape [adhesive tape]  Weight: Patient Vitals for the past 96 hrs (Last 3 readings):   Weight   10/10/24 1401 122.5 kg (270 lb)     Arrived from: home  Chief Complaint:   Chief Complaint   Patient presents with    Labs Only     PCP told him he had abnormal kidney lab values     Hospital Problem/Diagnosis:  Principal Problem:    Acute kidney injury superimposed on CKD (HCC)  Resolved Problems:    * No resolved hospital problems. *    Imaging:   XR CHEST (2 VW)   Final Result   No acute process.           Abnormal labs:   Abnormal Labs Reviewed   BASIC METABOLIC PANEL - Abnormal; Notable for the following components:       Result Value    Potassium 6.5 (*)     Chloride 109 (*)     CO2 14 (*)     Glucose 157 (*)     BUN 72 (*)     Creatinine 3.8 (*)     Est, Glom Filt Rate 17 (*)     All other components within normal limits   CBC WITH AUTO DIFFERENTIAL - Abnormal; Notable for the following components:    WBC 12.2 (*)     RBC 4.10 (*)     Hemoglobin 12.3 (*)     Hematocrit 37.4 (*)     Neutrophils % 70 (*)     Lymphocytes % 16 (*)     Immature Granulocytes % 2 (*)     Neutrophils Absolute 8.61 (*)     Eosinophils Absolute 0.49 (*)     All other components within normal limits   TROPONIN - Abnormal; Notable for the following components:    Troponin, High Sensitivity 65 (*)     All other components within normal limits   TROPONIN - Abnormal; Notable for the following components:    Troponin, High Sensitivity 55 (*)     All other components within normal limits   MAGNESIUM - Abnormal; Notable for the following components:    Magnesium 1.4 (*)     All other components  No)  [N/A] IV Fluids ordered (Yes or No)             [N/A] 2nd IV completed (Yes or No)  [N/A] Hourly Vital Signs (Validated)  [N/A] Outstanding Orders:    Pending orders Need urine collected  Plan for Discharge (if known):   Additional Comments: N/A   If any further questions, please call Sending RN at [phone]    [x] Medication Reconciliation was completed and the patient's home medication list was verified. The Med List Status is \"Complete\". The following sources were used to assist with Medication Reconciliation:    [] Med Rec Pharmacist already completed   [] Patient had a list of medications which was transcribed into the EHR.  [] Patient provided bottles of their medications  [x] Home medications reviewed and confirmed with documented RN  [] Contacted patient's pharmacy to confirm home medications  [] Contacted patient's physician office to confirm home medications  [] Medical Records from another facility and/or Care Everywhere were reviewed  [] MAR from facility requested to be faxed over  [] Unable to complete due to patient condition  [] Unable to validate home medications      [] There are one or more home medications that need clarification before Medication Reconciliation can be completed. The Med List Status has been marked as In Progress.     To assist with Home Medication Reconciliation the following actions have been taken:    [] Pharmacy medication reconciliation service requested. (Note: This can be done by sending a Perfect Serve message to The Med Rec Pharmacist or by phoning [phone].)  [] Family requested to bring medications into the hospital  [] Family requested to call hospital with medication list  [] Message left with physician office  [] Request for medical records made to N/A  [] Other N/A    Electronically signed by: Electronically signed by Louise Gunn RN on 10/10/2024 at 7:50 PM

## 2024-10-10 NOTE — ED NOTES
1600   BP: 135/81 124/78 123/76   Pulse: 81 79 76   Resp: 16 17 20   Temp: 98.6 °F (37 °C)     SpO2: 99% 98% 96%   Weight: 122.5 kg (270 lb)       FiO2 (%):   O2 Flow Rate:      Cardiac Rhythm:    Pain Assessment:  [] Verbal [] Meraz Baker Scale  Pain Scale: Pain Assessment  Pain Assessment: None - Denies Pain  Last documented pain score (0-10 scale)    Last documented pain medication administered:   Mental Status: oriented, alert, coherent, logical, thought processes intact, and able to concentrate and follow conversation  Orientation Level:    NIH Score:    C-SSRS: Risk of Suicide: No Risk  Bedside swallow:    [place] Coma Scale (GCS): Rob Coma Scale  Eye Opening: Spontaneous  Best Verbal Response: Oriented  Best Motor Response: Obeys commands  Rob Coma Scale Score: 15  Active LDA's:   Peripheral IV 10/10/24 Left Forearm (Active)   Site Assessment Clean, dry & intact 10/10/24 1458   Line Status Blood return noted;Brisk blood return;Flushed 10/10/24 1458   Phlebitis Assessment No symptoms 10/10/24 1458   Infiltration Assessment 0 10/10/24 1458   Dressing Status New dressing applied;Clean, dry & intact 10/10/24 1458   Dressing Type Transparent 10/10/24 1458   Dressing Intervention New 10/10/24 1458                 PO Status: Regular  Pertinent or High Risk Medications/Drips: no   If Yes, please provide details:   Pending Blood Product Administration: no       You may also review the ED PT Care Timeline found under the Summary Nursing Index tab.    Recommendation        Pending orders needs [facility] at 1930 to recheck K  Plan for Discharge (if known):   Additional Comments:    If any further questions, please call Sending RN at [phone]    [] Medication Reconciliation was completed and the patient's home medication list was verified. The Med List Status is \"Complete\". The following sources were used to assist with Medication Reconciliation:    [] Med Rec Pharmacist already completed   [] Patient had a list of  medications which was transcribed into the EHR.  [] Patient provided bottles of their medications  [] Home medications reviewed and confirmed with   [] Contacted patient's pharmacy to confirm home medications  [] Contacted patient's physician office to confirm home medications  [] Medical Records from another facility and/or Care Everywhere were reviewed  [] MAR from facility requested to be faxed over  [] Unable to complete due to patient condition  [] Unable to validate home medications      [] There are one or more home medications that need clarification before Medication Reconciliation can be completed. The Med List Status has been marked as In Progress.     To assist with Home Medication Reconciliation the following actions have been taken:    [] Pharmacy medication reconciliation service requested. (Note: This can be done by sending a Perfect Serve message to The [facility] Rec Pharmacist or by phoning [phone].)  [] Family requested to bring medications into the hospital  [] Family requested to call hospital with medication list  [] Message left with physician office  [] Request for medical records made to   [] Other     Electronically signed by: Electronically signed by Joleen X Schwab, RN on 10/10/2024 at 4:32 PM

## 2024-10-11 LAB
ALBUMIN SERPL-MCNC: 3.7 G/DL (ref 3.5–5.2)
ALP SERPL-CCNC: 114 U/L (ref 40–129)
ALT SERPL-CCNC: 25 U/L (ref 5–41)
ANION GAP SERPL CALCULATED.3IONS-SCNC: 10 MMOL/L (ref 9–17)
ANION GAP SERPL CALCULATED.3IONS-SCNC: 13 MMOL/L (ref 9–17)
AST SERPL-CCNC: 19 U/L
BASOPHILS # BLD: 0.05 K/UL (ref 0–0.2)
BASOPHILS NFR BLD: 0 % (ref 0–2)
BILIRUB SERPL-MCNC: 0.2 MG/DL (ref 0.3–1.2)
BUN SERPL-MCNC: 58 MG/DL (ref 8–23)
BUN SERPL-MCNC: 63 MG/DL (ref 8–23)
BUN/CREAT SERPL: 17 (ref 9–20)
BUN/CREAT SERPL: 18 (ref 9–20)
CALCIUM SERPL-MCNC: 8.6 MG/DL (ref 8.6–10.4)
CALCIUM SERPL-MCNC: 8.6 MG/DL (ref 8.6–10.4)
CHLORIDE SERPL-SCNC: 103 MMOL/L (ref 98–107)
CHLORIDE SERPL-SCNC: 107 MMOL/L (ref 98–107)
CO2 SERPL-SCNC: 18 MMOL/L (ref 20–31)
CO2 SERPL-SCNC: 21 MMOL/L (ref 20–31)
CREAT SERPL-MCNC: 3.5 MG/DL (ref 0.7–1.2)
CREAT SERPL-MCNC: 3.5 MG/DL (ref 0.7–1.2)
EOSINOPHIL # BLD: 0.45 K/UL (ref 0–0.44)
EOSINOPHILS RELATIVE PERCENT: 4 % (ref 1–4)
ERYTHROCYTE [DISTWIDTH] IN BLOOD BY AUTOMATED COUNT: 13.5 % (ref 11.8–14.4)
GFR, ESTIMATED: 18 ML/MIN/1.73M2
GFR, ESTIMATED: 18 ML/MIN/1.73M2
GLUCOSE BLD-MCNC: 111 MG/DL (ref 75–110)
GLUCOSE BLD-MCNC: 115 MG/DL (ref 75–110)
GLUCOSE BLD-MCNC: 127 MG/DL (ref 75–110)
GLUCOSE BLD-MCNC: 127 MG/DL (ref 75–110)
GLUCOSE BLD-MCNC: 135 MG/DL (ref 75–110)
GLUCOSE BLD-MCNC: 140 MG/DL (ref 75–110)
GLUCOSE BLD-MCNC: 164 MG/DL (ref 75–110)
GLUCOSE BLD-MCNC: 91 MG/DL (ref 75–110)
GLUCOSE SERPL-MCNC: 127 MG/DL (ref 70–99)
GLUCOSE SERPL-MCNC: 170 MG/DL (ref 70–99)
HCT VFR BLD AUTO: 33.6 % (ref 40.7–50.3)
HGB BLD-MCNC: 11.2 G/DL (ref 13–17)
IMM GRANULOCYTES # BLD AUTO: 0.12 K/UL (ref 0–0.3)
IMM GRANULOCYTES NFR BLD: 1 %
INR PPP: 1.2
LYMPHOCYTES NFR BLD: 2.5 K/UL (ref 1.1–3.7)
LYMPHOCYTES RELATIVE PERCENT: 22 % (ref 24–43)
MAGNESIUM SERPL-MCNC: 1.8 MG/DL (ref 1.6–2.6)
MCH RBC QN AUTO: 29.8 PG (ref 25.2–33.5)
MCHC RBC AUTO-ENTMCNC: 33.3 G/DL (ref 28.4–34.8)
MCV RBC AUTO: 89.4 FL (ref 82.6–102.9)
MONOCYTES NFR BLD: 1.13 K/UL (ref 0.1–1.2)
MONOCYTES NFR BLD: 10 % (ref 3–12)
NEUTROPHILS NFR BLD: 63 % (ref 36–65)
NEUTS SEG NFR BLD: 7 K/UL (ref 1.5–8.1)
NRBC BLD-RTO: 0 PER 100 WBC
PHOSPHATE SERPL-MCNC: 4.4 MG/DL (ref 2.5–4.5)
PLATELET # BLD AUTO: 198 K/UL (ref 138–453)
PMV BLD AUTO: 10.2 FL (ref 8.1–13.5)
POTASSIUM SERPL-SCNC: 4.8 MMOL/L (ref 3.7–5.3)
POTASSIUM SERPL-SCNC: 4.9 MMOL/L (ref 3.7–5.3)
PROT SERPL-MCNC: 6.7 G/DL (ref 6.4–8.3)
PROTHROMBIN TIME: 14.8 SEC (ref 11.5–14.2)
RBC # BLD AUTO: 3.76 M/UL (ref 4.21–5.77)
SODIUM SERPL-SCNC: 134 MMOL/L (ref 135–144)
SODIUM SERPL-SCNC: 138 MMOL/L (ref 135–144)
WBC OTHER # BLD: 11.3 K/UL (ref 3.5–11.3)

## 2024-10-11 PROCEDURE — 84100 ASSAY OF PHOSPHORUS: CPT

## 2024-10-11 PROCEDURE — 94761 N-INVAS EAR/PLS OXIMETRY MLT: CPT

## 2024-10-11 PROCEDURE — 2580000003 HC RX 258: Performed by: NURSE PRACTITIONER

## 2024-10-11 PROCEDURE — 85025 COMPLETE CBC W/AUTO DIFF WBC: CPT

## 2024-10-11 PROCEDURE — 82947 ASSAY GLUCOSE BLOOD QUANT: CPT

## 2024-10-11 PROCEDURE — 2500000003 HC RX 250 WO HCPCS

## 2024-10-11 PROCEDURE — 6360000002 HC RX W HCPCS: Performed by: NURSE PRACTITIONER

## 2024-10-11 PROCEDURE — 6370000000 HC RX 637 (ALT 250 FOR IP): Performed by: INTERNAL MEDICINE

## 2024-10-11 PROCEDURE — 83735 ASSAY OF MAGNESIUM: CPT

## 2024-10-11 PROCEDURE — 80048 BASIC METABOLIC PNL TOTAL CA: CPT

## 2024-10-11 PROCEDURE — 80053 COMPREHEN METABOLIC PANEL: CPT

## 2024-10-11 PROCEDURE — 36415 COLL VENOUS BLD VENIPUNCTURE: CPT

## 2024-10-11 PROCEDURE — 2580000003 HC RX 258

## 2024-10-11 PROCEDURE — 85610 PROTHROMBIN TIME: CPT

## 2024-10-11 PROCEDURE — 6360000002 HC RX W HCPCS

## 2024-10-11 PROCEDURE — 2060000000 HC ICU INTERMEDIATE R&B

## 2024-10-11 PROCEDURE — 2580000003 HC RX 258: Performed by: INTERNAL MEDICINE

## 2024-10-11 PROCEDURE — 99232 SBSQ HOSP IP/OBS MODERATE 35: CPT | Performed by: INTERNAL MEDICINE

## 2024-10-11 PROCEDURE — 6370000000 HC RX 637 (ALT 250 FOR IP): Performed by: NURSE PRACTITIONER

## 2024-10-11 PROCEDURE — 2500000003 HC RX 250 WO HCPCS: Performed by: INTERNAL MEDICINE

## 2024-10-11 RX ORDER — DILTIAZEM HCL 60 MG
60 TABLET ORAL 2 TIMES DAILY
Status: ON HOLD | COMMUNITY
End: 2024-10-12 | Stop reason: HOSPADM

## 2024-10-11 RX ORDER — LANOLIN ALCOHOL/MO/W.PET/CERES
400 CREAM (GRAM) TOPICAL DAILY
COMMUNITY

## 2024-10-11 RX ADMIN — Medication 1 TABLET: at 07:58

## 2024-10-11 RX ADMIN — SODIUM BICARBONATE 1300 MG: 650 TABLET ORAL at 21:06

## 2024-10-11 RX ADMIN — ACETAMINOPHEN 650 MG: 325 TABLET ORAL at 19:10

## 2024-10-11 RX ADMIN — DULOXETINE HYDROCHLORIDE 30 MG: 30 CAPSULE, DELAYED RELEASE ORAL at 07:58

## 2024-10-11 RX ADMIN — PANTOPRAZOLE SODIUM 40 MG: 40 TABLET, DELAYED RELEASE ORAL at 16:24

## 2024-10-11 RX ADMIN — SODIUM CHLORIDE, PRESERVATIVE FREE 10 ML: 5 INJECTION INTRAVENOUS at 07:59

## 2024-10-11 RX ADMIN — SODIUM ZIRCONIUM CYCLOSILICATE 10 G: 10 POWDER, FOR SUSPENSION ORAL at 07:58

## 2024-10-11 RX ADMIN — HEPARIN SODIUM 5000 UNITS: 5000 INJECTION INTRAVENOUS; SUBCUTANEOUS at 13:42

## 2024-10-11 RX ADMIN — METOPROLOL TARTRATE 100 MG: 50 TABLET, FILM COATED ORAL at 21:06

## 2024-10-11 RX ADMIN — SODIUM BICARBONATE 1300 MG: 650 TABLET ORAL at 07:59

## 2024-10-11 RX ADMIN — HYDROXYZINE HYDROCHLORIDE 50 MG: 25 TABLET ORAL at 21:06

## 2024-10-11 RX ADMIN — FERROUS SULFATE TAB EC 325 MG (65 MG FE EQUIVALENT) 325 MG: 325 (65 FE) TABLET DELAYED RESPONSE at 07:59

## 2024-10-11 RX ADMIN — HEPARIN SODIUM 5000 UNITS: 5000 INJECTION INTRAVENOUS; SUBCUTANEOUS at 06:07

## 2024-10-11 RX ADMIN — SODIUM BICARBONATE: 84 INJECTION, SOLUTION INTRAVENOUS at 10:03

## 2024-10-11 RX ADMIN — PANTOPRAZOLE SODIUM 40 MG: 40 TABLET, DELAYED RELEASE ORAL at 06:07

## 2024-10-11 RX ADMIN — Medication 1000 UNITS: at 21:06

## 2024-10-11 RX ADMIN — HEPARIN SODIUM 5000 UNITS: 5000 INJECTION INTRAVENOUS; SUBCUTANEOUS at 21:07

## 2024-10-11 RX ADMIN — SODIUM BICARBONATE 1300 MG: 650 TABLET ORAL at 13:42

## 2024-10-11 RX ADMIN — PRAVASTATIN SODIUM 20 MG: 20 TABLET ORAL at 21:06

## 2024-10-11 RX ADMIN — HYDRALAZINE HYDROCHLORIDE 10 MG: 20 INJECTION, SOLUTION INTRAMUSCULAR; INTRAVENOUS at 18:11

## 2024-10-11 RX ADMIN — SODIUM BICARBONATE: 84 INJECTION, SOLUTION INTRAVENOUS at 21:13

## 2024-10-11 RX ADMIN — AZELASTINE 2 SPRAY: 1 SPRAY, METERED NASAL at 13:43

## 2024-10-11 RX ADMIN — TRAZODONE HYDROCHLORIDE 50 MG: 50 TABLET ORAL at 21:06

## 2024-10-11 RX ADMIN — METOPROLOL TARTRATE 100 MG: 50 TABLET, FILM COATED ORAL at 07:59

## 2024-10-11 ASSESSMENT — PAIN SCALES - GENERAL
PAINLEVEL_OUTOF10: 3
PAINLEVEL_OUTOF10: 0

## 2024-10-11 ASSESSMENT — PAIN DESCRIPTION - LOCATION: LOCATION: HEAD

## 2024-10-11 ASSESSMENT — PAIN DESCRIPTION - DESCRIPTORS: DESCRIPTORS: ACHING

## 2024-10-11 NOTE — PROGRESS NOTES
End Of Shift Note  [place] [facility]  Summary of shift: Patient alert and oriented x4, afebrile. Bicarb gtt and tabs maintained. BMP repeat resulted, bic rate decreased, fluid restriction now 1000 ml/day. Had 1375 ml urine output on my shift. Stepdown status.    Vitals:    Vitals:    10/11/24 1400 10/11/24 1500 10/11/24 1600 10/11/24 1700   BP: (!) 143/72 (!) 143/80 135/73 (!) 160/86   Pulse: 81 82 68 78   Resp:       Temp:   98.2 °F (36.8 °C)    TempSrc:   Axillary    SpO2: 100% 100% 98% 99%   Weight:       Height:            I&O:   Intake/Output Summary (Last 24 hours) at 10/11/2024 1728  Last data filed at 10/11/2024 1626  Gross per 24 hour   Intake 2530.08 ml   Output 2175 ml   Net 355.08 ml       Resp Status: RA    Ventilator Settings:     / / /     Critical Care IV infusions:   sodium chloride      dextrose      dextrose      sodium bicarbonate 150 mEq in dextrose 5 % 1,000 mL infusion 100 mL/hr at 10/11/24 1626        LDA:   Peripheral IV 10/10/24 Left Forearm (Active)   Number of days: 1

## 2024-10-11 NOTE — PROGRESS NOTES
Pt admitted to room 1106. Pt oriented to room. Concerns and questions answered as able. Standard safety precautions in place.

## 2024-10-11 NOTE — PLAN OF CARE
Problem: Chronic Conditions and Co-morbidities  Goal: Patient's chronic conditions and co-morbidity symptoms are monitored and maintained or improved  10/11/2024 1025 by Hina Olivares RN  Outcome: Progressing  Flowsheets (Taken 10/11/2024 0800)  Care Plan - Patient's Chronic Conditions and Co-Morbidity Symptoms are Monitored and Maintained or Improved: Monitor and assess patient's chronic conditions and comorbid symptoms for stability, deterioration, or improvement  10/11/2024 0204 by Elsie Medina RN  Outcome: Progressing  Flowsheets (Taken 10/11/2024 0204)  Care Plan - Patient's Chronic Conditions and Co-Morbidity Symptoms are Monitored and Maintained or Improved:   Monitor and assess patient's chronic conditions and comorbid symptoms for stability, deterioration, or improvement   Collaborate with multidisciplinary team to address chronic and comorbid conditions and prevent exacerbation or deterioration   Update acute care plan with appropriate goals if chronic or comorbid symptoms are exacerbated and prevent overall improvement and discharge     Problem: Discharge Planning  Goal: Discharge to home or other facility with appropriate resources  10/11/2024 1025 by Hina Olivares RN  Outcome: Progressing  Flowsheets (Taken 10/11/2024 0800)  Discharge to home or other facility with appropriate resources: Identify barriers to discharge with patient and caregiver  10/11/2024 0204 by Elsie Mdeina RN  Outcome: Progressing  Flowsheets (Taken 10/11/2024 0204)  Discharge to home or other facility with appropriate resources:   Identify barriers to discharge with patient and caregiver   Arrange for needed discharge resources and transportation as appropriate     Problem: Pain  Goal: Verbalizes/displays adequate comfort level or baseline comfort level  10/11/2024 1025 by Hina Olivares, RN  Outcome: Progressing  10/11/2024 0204 by Elsie Medina RN  Outcome: Progressing  Flowsheets (Taken 10/11/2024  0204)  Verbalizes/displays adequate comfort level or baseline comfort level:   Encourage patient to monitor pain and request assistance   Assess pain using appropriate pain scale     Problem: Safety - Adult  Goal: Free from fall injury  10/11/2024 1025 by Hina Olivares, RN  Outcome: Progressing  10/11/2024 0204 by Elsie Medina, RN  Outcome: Progressing  Flowsheets (Taken 10/11/2024 0204)  Free From Fall Injury: Instruct family/caregiver on patient safety

## 2024-10-11 NOTE — CARE COORDINATION
Case Management Assessment  Initial Evaluation    Date/Time of Evaluation: 10/11/2024 5:50 PM  Assessment Completed by: TRISTAN PALMA RN    If patient is discharged prior to next notation, then this note serves as note for discharge by case management.    Patient Name: Prateek Denton II                   YOB: 1960  Diagnosis: Hyperkalemia [E87.5]  Acute kidney injury superimposed on CKD (HCC) [N17.9, N18.9]  Chronic kidney disease, unspecified CKD stage [N18.9]                   Date / Time: 10/10/2024  2:02 PM    Patient Admission Status: Inpatient   Readmission Risk (Low < 19, Mod (19-27), High > 27): Readmission Risk Score: 16.3    Current PCP: Lisseth Coello APRN - CNP  PCP verified by CM? Yes    Chart Reviewed: Yes      History Provided by: Patient  Patient Orientation: Alert and Oriented, Person, Place, Situation, Self    Patient Cognition: Alert    Hospitalization in the last 30 days (Readmission):  No    If yes, Readmission Assessment in  Navigator will be completed.    Advance Directives:      Code Status: Full Code   Patient's Primary Decision Maker is: Legal Next of Kin    Primary Decision Maker: Valencia Denton - Spouse - [phone]    Discharge Planning:    Patient lives with: Spouse/Significant Other Type of Home: House  Primary Care Giver: Self  Patient Support Systems include: Spouse/Significant Other   Current Financial resources: None  Current community resources: None  Current services prior to admission: C-pap            Current DME:              Type of Home Care services:  None    ADLS  Prior functional level: Independent in ADLs/IADLs  Current functional level: Independent in ADLs/IADLs    PT AM-PAC:   /24  OT AM-PAC:   /24    Family can provide assistance at DC: Yes  Would you like Case Management to discuss the discharge plan with any other family members/significant others, and if so, who? Yes (spouse)  Plans to Return to Present Housing:    Other Identified

## 2024-10-11 NOTE — RT PROTOCOL NOTE
RT Inhaler-Nebulizer Bronchodilator Protocol Note    There is a bronchodilator order in the chart from a provider indicating to follow the RT Bronchodilator Protocol and there is an “Initiate RT Inhaler-Nebulizer Bronchodilator Protocol” order as well (see protocol at bottom of note).    CXR Findings:  No results found.    The findings from the last RT Protocol Assessment were as follows:   History Pulmonary Disease: Smoker 15 pack years or more  Respiratory Pattern: Regular pattern and RR 12-20 bpm  Breath Sounds: Clear breath sounds  Cough: Strong, spontaneous, non-productive  Indication for Bronchodilator Therapy: Decreased or absent breath sounds  Bronchodilator Assessment Score: 1    Aerosolized bronchodilator medication orders have been revised according to the RT Inhaler-Nebulizer Bronchodilator Protocol below.    Respiratory Therapist to perform RT Therapy Protocol Assessment initially then follow the protocol.  Repeat RT Therapy Protocol Assessment PRN for score 0-3 or on second treatment, BID, and PRN for scores above 3.    No Indications - adjust the frequency to every 6 hours PRN wheezing or bronchospasm, if no treatments needed after 48 hours then discontinue using Per Protocol order mode.     If indication present, adjust the RT bronchodilator orders based on the Bronchodilator Assessment Score as indicated below.  Use Inhaler orders unless patient has one or more of the following: on home nebulizer, not able to hold breath for 10 seconds, is not alert and oriented, cannot activate and use MDI correctly, or respiratory rate 25 breaths per minute or more, then use the equivalent nebulizer order(s) with same Frequency and PRN reasons based on the score.  If a patient is on this medication at home then do not decrease Frequency below that used at home.    0-3 - enter or revise RT bronchodilator order(s) to equivalent RT Bronchodilator order with Frequency of every 4 hours PRN for wheezing or increased

## 2024-10-11 NOTE — H&P
[facility]  Office: [phone]  Aung Luis DO, Mansoor Cline DO, Josse Han DO, Gregory Archibald DO, Andrew Roberts MD, Mayra Medel MD, Ray Johnson MD, Romy Amador MD,  Mic Meraz MD, Rom Hayes MD, Casandra Washington MD,  Ely Burks DO, Dorie Anderson MD, Marcos Travis MD, Trey Luis DO, Delia Clemens MD,  Jorge Major DO, Gisel Herbert MD, Rose Yi MD, Celia Guillen MD, Matthew Hyatt MD,  Sammy Galvez MD, Mary Kay Karimi MD, Edson Hay MD, Mateo Turner MD, Stan Cline MD, Radha Hoskins MD, Dick Lam DO, Peter Isbell DO, Giles Cat MD,  Eran Mckenzie MD, Shirley Waterhouse, CNP,  Missy Chowdhury CNP, Mikael Cai, CNP,  Shantelle Monson, DNP, Betty Boles, CNP, Yany Garcia, CNP, Zulay Ramsay CNP, Cathie Law CNP, Lynda Russell, CNP, An King, PA-C, Zoraida Sherman PA-C, Gabby Vaughn, CNP, Floridalma Cook, CNP, Jaclyn Neff, CNP, Patsy Peterson, CNS, Yareli Montiel, CNP, Martha Prince CNP, Tracy Schwab, CNP         [facility]   IN-PATIENT SERVICE   [facility]    HISTORY AND PHYSICAL EXAMINATION            Date:   10/10/2024  Patient name:  Prateek Denton II  Date of admission:  10/10/2024  2:02 PM  MRN:   [id number]  Account:  [id number]  YOB: 1960  PCP:    Lisseth Coello APRN - CNP  Room:   [address] [place] [address]  Code Status:    Full Code    Chief Complaint:     Chief Complaint   Patient presents with    Labs Only     PCP told him he had abnormal kidney lab values       History Obtained From:     patient, spouse    History of Present Illness:     Prateek Denton II is a 64 y.o. Non- / non  male who presents with Labs Only (PCP told him he had abnormal kidney lab values)   and is admitted to the hospital for the management of Acute kidney injury superimposed on CKD (HCC).    Patient presents emergency department with complaints of abnormal outpatient lab work.  He  5.0 - 8.0    Protein, UA 3+ (A) NEGATIVE mg/dL    Urobilinogen, Urine Normal 0.0 - 1.0 EU/dL    Nitrite, Urine NEGATIVE NEGATIVE    Leukocyte Esterase, Urine NEGATIVE NEGATIVE    WBC, UA 2 TO 5 0 - 5 /HPF    RBC, UA 2 TO 5 0 - 2 /HPF    Epithelial Cells, UA 5 TO 10 0 - 5 /HPF   Basic Metabolic Panel    Collection Time: 10/10/24  7:59 PM   Result Value Ref Range    Sodium 138 135 - 144 mmol/L    Potassium 6.1 (HH) 3.7 - 5.3 mmol/L    Chloride 110 (H) 98 - 107 mmol/L    CO2 17 (L) 20 - 31 mmol/L    Anion Gap 11 9 - 17 mmol/L    Glucose 106 (H) 70 - 99 mg/dL    BUN 72 (H) 8 - 23 mg/dL    Creatinine 3.6 (H) 0.7 - 1.2 mg/dL    Est, Glom Filt Rate 18 (L) >60 mL/min/1.73m2    BUN/Creatinine Ratio 20 9 - 20    Calcium 8.8 8.6 - 10.4 mg/dL       Imaging/Diagnostics:  XR CHEST (2 VW)    Result Date: 10/10/2024  No acute process.       Assessment :      Hospital Problems             Last Modified POA    * (Principal) Acute kidney injury superimposed on CKD ([facility]) 10/10/2024 Yes    Essential hypertension 10/10/2024 Yes    Type 2 diabetes mellitus with diabetic nephropathy, with long-term current use of insulin ([facility]) 10/10/2024 Yes    Hyperkalemia 10/10/2024 Yes    Paroxysmal A-fib ([facility]) 10/10/2024 Yes    Hypomagnesemia 10/10/2024 Yes       Plan:     Patient status inpatient in the  Medical ICU    DEBBI superimposed on CKD with unilateral kidney subsequent hyperkalemia hypomagnesemia  Admit to medical ICU with continuous cardiac monitoring  Potassium initially 6.5 with repeat 6.1, repeat hyperkalemia protocol and repeat EKG  Continuous cardiac monitoring, no need to continue to trend troponin unless patient develops chest pain  Nephrology services on consult- appreciate recommendations  Monitor for signs of uremia including anorexia, nausea, vomiting, metallic taste and altered mental status  Resume sodium bicarbonate tablets 3 times daily, utilize Imodium as needed for diarrhea  Start bicarb drip overnight, give additional

## 2024-10-11 NOTE — PROGRESS NOTES
POA    * (Principal) Acute kidney injury superimposed on CKD ([facility]) 10/10/2024 Yes    Essential hypertension 10/10/2024 Yes    Type 2 diabetes mellitus with diabetic nephropathy, with long-term current use of insulin ([facility]) 10/10/2024 Yes    Hyperkalemia 10/10/2024 Yes    Paroxysmal A-fib ([facility]) 10/10/2024 Yes    Hypomagnesemia 10/10/2024 Yes       Plan:        Continue sodium bicarbonate drip per nephrology evaluations  Recommended not to stop sodium bicarbonate pills in future  Continue DVT prophylaxis    Andrew Roberts MD  10/11/2024  3:41 PM

## 2024-10-11 NOTE — CONSULTS
Reason for Consult:  Acute kidney injury.    Requesting Physician:  Andrew Roberts MD    Assessment:  Acute kidney injury, hemodynamically related. Cr is slowly improving.  Underlying CKD stage 4 with baseline GFR of 20s ml/min.  Nephrotic syndrome - diabetic kidney disease.  History of hyponatremia.  AG metabolic acidosis, improving.  Hypertension.  Hypovitaminosis D.  Anemia of CKD.  Single R kidney since 2020.     Plan:  Agree with holding Bumex and Lisinopril.  Continue bicarbonate drip and oral sodium bicarbonate 1300 mg po TID. Advised not to stop taking NaHCO3 pills in the future.   Continue low K diet with oral fluid restriction of 1200 ml/24 hours.  Continue vitamin D at 1000 iu once daily.  Monitor BP.  Avoid hypotension, nephrotoxic drugs, Lovenox and IV contrast exposure.  Follow up chemistries ordered for later today and for AM.    Thank you for the consultation. Please do not hesitate to contact us for any further questions/concerns. We will continue to follow along with you.     HISTORY OF PRESENT ILLNESS:    The patient is a 64 y.o. male who has history of CKD stage 4 who normally follows with us for nephrology care, presents with elevated K and low CO2 on routine labs. He stopped taking his sodium bicarbonate pills 2 weeks ago believing it was causing him diarrhea. On previous labs his serum creatinines have been between 2.4 to 2.8 with eGFR of 20s ml/min. On admission his creatinine was elevated at 3.8 that has improved to 3.5 today. Denies any problems with nausea, vomiting, appetite, diarrhea or difficulty with urination. Denies any recent use of NSAIDs or iv contrast.    Review Of Systems:   Constitutional: No fever, chills, lethargy, weakness or wt loss.  HEENT:  No headache, nasal discharge or sore throat.  Cardiac:  No chest pain, dyspnea, orthopnea or PND.  Chest:              No cough, phlegm or wheezing.  Abdomen:  No abdominal pain, nausea, vomiting or diarrhea.  Neuro:  No gross focal

## 2024-10-11 NOTE — PLAN OF CARE
Problem: Chronic Conditions and Co-morbidities  Goal: Patient's chronic conditions and co-morbidity symptoms are monitored and maintained or improved  Outcome: Progressing  Flowsheets (Taken 10/11/2024 0204)  Care Plan - Patient's Chronic Conditions and Co-Morbidity Symptoms are Monitored and Maintained or Improved:   Monitor and assess patient's chronic conditions and comorbid symptoms for stability, deterioration, or improvement   Collaborate with multidisciplinary team to address chronic and comorbid conditions and prevent exacerbation or deterioration   Update acute care plan with appropriate goals if chronic or comorbid symptoms are exacerbated and prevent overall improvement and discharge     Problem: Discharge Planning  Goal: Discharge to home or other facility with appropriate resources  Outcome: Progressing  Flowsheets (Taken 10/11/2024 0204)  Discharge to home or other facility with appropriate resources:   Identify barriers to discharge with patient and caregiver   Arrange for needed discharge resources and transportation as appropriate     Problem: Pain  Goal: Verbalizes/displays adequate comfort level or baseline comfort level  Outcome: Progressing  Flowsheets (Taken 10/11/2024 0204)  Verbalizes/displays adequate comfort level or baseline comfort level:   Encourage patient to monitor pain and request assistance   Assess pain using appropriate pain scale     Problem: Safety - Adult  Goal: Free from fall injury  Outcome: Progressing  Flowsheets (Taken 10/11/2024 0204)  Free From Fall Injury: Instruct family/caregiver on patient safety

## 2024-10-11 NOTE — ED NOTES
Call received from Saray, requested BMP be redrawn prior to transfer to ICU, completed. Wife at bedside. Patient denies any needs. No complaints. Neuros intact. VSS.

## 2024-10-12 VITALS
HEIGHT: 71 IN | SYSTOLIC BLOOD PRESSURE: 147 MMHG | TEMPERATURE: 98.4 F | BODY MASS INDEX: 38.43 KG/M2 | OXYGEN SATURATION: 100 % | DIASTOLIC BLOOD PRESSURE: 75 MMHG | RESPIRATION RATE: 14 BRPM | HEART RATE: 65 BPM | WEIGHT: 274.47 LBS

## 2024-10-12 LAB
ANION GAP SERPL CALCULATED.3IONS-SCNC: 11 MMOL/L (ref 9–17)
BASOPHILS # BLD: 0.05 K/UL (ref 0–0.2)
BASOPHILS NFR BLD: 1 % (ref 0–2)
BUN SERPL-MCNC: 52 MG/DL (ref 8–23)
BUN/CREAT SERPL: 16 (ref 9–20)
CALCIUM SERPL-MCNC: 8.7 MG/DL (ref 8.6–10.4)
CHLORIDE SERPL-SCNC: 105 MMOL/L (ref 98–107)
CO2 SERPL-SCNC: 23 MMOL/L (ref 20–31)
CREAT SERPL-MCNC: 3.3 MG/DL (ref 0.7–1.2)
EKG ATRIAL RATE: 78 BPM
EKG ATRIAL RATE: 79 BPM
EKG P AXIS: 31 DEGREES
EKG P AXIS: 35 DEGREES
EKG P-R INTERVAL: 174 MS
EKG P-R INTERVAL: 184 MS
EKG Q-T INTERVAL: 372 MS
EKG Q-T INTERVAL: 384 MS
EKG QRS DURATION: 80 MS
EKG QRS DURATION: 84 MS
EKG QTC CALCULATION (BAZETT): 424 MS
EKG QTC CALCULATION (BAZETT): 440 MS
EKG R AXIS: 2 DEGREES
EKG T AXIS: 25 DEGREES
EKG T AXIS: 37 DEGREES
EKG VENTRICULAR RATE: 78 BPM
EKG VENTRICULAR RATE: 79 BPM
EOSINOPHIL # BLD: 0.34 K/UL (ref 0–0.44)
EOSINOPHILS RELATIVE PERCENT: 4 % (ref 1–4)
ERYTHROCYTE [DISTWIDTH] IN BLOOD BY AUTOMATED COUNT: 13.4 % (ref 11.8–14.4)
GFR, ESTIMATED: 20 ML/MIN/1.73M2
GLUCOSE BLD-MCNC: 129 MG/DL (ref 75–110)
GLUCOSE BLD-MCNC: 143 MG/DL (ref 75–110)
GLUCOSE SERPL-MCNC: 121 MG/DL (ref 70–99)
HCT VFR BLD AUTO: 33.9 % (ref 40.7–50.3)
HGB BLD-MCNC: 11.3 G/DL (ref 13–17)
IMM GRANULOCYTES # BLD AUTO: 0.08 K/UL (ref 0–0.3)
IMM GRANULOCYTES NFR BLD: 1 %
LYMPHOCYTES NFR BLD: 2.41 K/UL (ref 1.1–3.7)
LYMPHOCYTES RELATIVE PERCENT: 28 % (ref 24–43)
MAGNESIUM SERPL-MCNC: 1.5 MG/DL (ref 1.6–2.6)
MCH RBC QN AUTO: 29.5 PG (ref 25.2–33.5)
MCHC RBC AUTO-ENTMCNC: 33.3 G/DL (ref 28.4–34.8)
MCV RBC AUTO: 88.5 FL (ref 82.6–102.9)
MONOCYTES NFR BLD: 0.88 K/UL (ref 0.1–1.2)
MONOCYTES NFR BLD: 10 % (ref 3–12)
NEUTROPHILS NFR BLD: 56 % (ref 36–65)
NEUTS SEG NFR BLD: 4.92 K/UL (ref 1.5–8.1)
NRBC BLD-RTO: 0 PER 100 WBC
PHOSPHATE SERPL-MCNC: 3.9 MG/DL (ref 2.5–4.5)
PLATELET # BLD AUTO: 209 K/UL (ref 138–453)
PMV BLD AUTO: 10.2 FL (ref 8.1–13.5)
POTASSIUM SERPL-SCNC: 4.8 MMOL/L (ref 3.7–5.3)
RBC # BLD AUTO: 3.83 M/UL (ref 4.21–5.77)
SODIUM SERPL-SCNC: 139 MMOL/L (ref 135–144)
WBC OTHER # BLD: 8.7 K/UL (ref 3.5–11.3)

## 2024-10-12 PROCEDURE — 85025 COMPLETE CBC W/AUTO DIFF WBC: CPT

## 2024-10-12 PROCEDURE — 6360000002 HC RX W HCPCS: Performed by: NURSE PRACTITIONER

## 2024-10-12 PROCEDURE — 6370000000 HC RX 637 (ALT 250 FOR IP): Performed by: NURSE PRACTITIONER

## 2024-10-12 PROCEDURE — 99232 SBSQ HOSP IP/OBS MODERATE 35: CPT | Performed by: INTERNAL MEDICINE

## 2024-10-12 PROCEDURE — 6360000002 HC RX W HCPCS: Performed by: INTERNAL MEDICINE

## 2024-10-12 PROCEDURE — 6370000000 HC RX 637 (ALT 250 FOR IP): Performed by: INTERNAL MEDICINE

## 2024-10-12 PROCEDURE — 36415 COLL VENOUS BLD VENIPUNCTURE: CPT

## 2024-10-12 PROCEDURE — 94761 N-INVAS EAR/PLS OXIMETRY MLT: CPT

## 2024-10-12 PROCEDURE — 84100 ASSAY OF PHOSPHORUS: CPT

## 2024-10-12 PROCEDURE — 2580000003 HC RX 258: Performed by: NURSE PRACTITIONER

## 2024-10-12 PROCEDURE — 93010 ELECTROCARDIOGRAM REPORT: CPT | Performed by: INTERNAL MEDICINE

## 2024-10-12 PROCEDURE — 83735 ASSAY OF MAGNESIUM: CPT

## 2024-10-12 PROCEDURE — 80048 BASIC METABOLIC PNL TOTAL CA: CPT

## 2024-10-12 PROCEDURE — 82947 ASSAY GLUCOSE BLOOD QUANT: CPT

## 2024-10-12 RX ORDER — PANTOPRAZOLE SODIUM 40 MG/1
40 TABLET, DELAYED RELEASE ORAL
Qty: 60 TABLET | Refills: 0 | Status: SHIPPED | OUTPATIENT
Start: 2024-10-12 | End: 2024-10-12 | Stop reason: HOSPADM

## 2024-10-12 RX ORDER — DILTIAZEM HCL 60 MG
60 TABLET ORAL 2 TIMES DAILY
Qty: 120 TABLET | Refills: 3 | Status: SHIPPED | OUTPATIENT
Start: 2024-10-12

## 2024-10-12 RX ORDER — SODIUM BICARBONATE 650 MG/1
1300 TABLET ORAL 3 TIMES DAILY
Qty: 180 TABLET | Refills: 0 | Status: SHIPPED | OUTPATIENT
Start: 2024-10-12 | End: 2024-11-11

## 2024-10-12 RX ORDER — FERROUS SULFATE 325(65) MG
325 TABLET, DELAYED RELEASE (ENTERIC COATED) ORAL DAILY
Qty: 90 TABLET | Refills: 3 | Status: SHIPPED | OUTPATIENT
Start: 2024-10-12

## 2024-10-12 RX ORDER — DILTIAZEM HCL 60 MG
60 TABLET ORAL 2 TIMES DAILY
Status: DISCONTINUED | OUTPATIENT
Start: 2024-10-12 | End: 2024-10-12 | Stop reason: HOSPADM

## 2024-10-12 RX ORDER — MAGNESIUM SULFATE IN WATER 40 MG/ML
2000 INJECTION, SOLUTION INTRAVENOUS ONCE
Status: COMPLETED | OUTPATIENT
Start: 2024-10-12 | End: 2024-10-12

## 2024-10-12 RX ORDER — PANTOPRAZOLE SODIUM 40 MG/1
40 TABLET, DELAYED RELEASE ORAL
Status: DISCONTINUED | OUTPATIENT
Start: 2024-10-13 | End: 2024-10-12 | Stop reason: HOSPADM

## 2024-10-12 RX ORDER — VITAMIN B COMPLEX
1000 TABLET ORAL DAILY
Qty: 30 TABLET | Refills: 0 | Status: SHIPPED | OUTPATIENT
Start: 2024-10-12

## 2024-10-12 RX ADMIN — MAGNESIUM SULFATE HEPTAHYDRATE 2000 MG: 40 INJECTION, SOLUTION INTRAVENOUS at 10:19

## 2024-10-12 RX ADMIN — DILTIAZEM HYDROCHLORIDE 60 MG: 60 TABLET ORAL at 10:19

## 2024-10-12 RX ADMIN — METOPROLOL TARTRATE 100 MG: 50 TABLET, FILM COATED ORAL at 07:51

## 2024-10-12 RX ADMIN — SODIUM BICARBONATE 1300 MG: 650 TABLET ORAL at 07:51

## 2024-10-12 RX ADMIN — SODIUM ZIRCONIUM CYCLOSILICATE 10 G: 10 POWDER, FOR SUSPENSION ORAL at 07:51

## 2024-10-12 RX ADMIN — SODIUM CHLORIDE, PRESERVATIVE FREE 10 ML: 5 INJECTION INTRAVENOUS at 07:52

## 2024-10-12 RX ADMIN — SODIUM BICARBONATE 1300 MG: 650 TABLET ORAL at 14:53

## 2024-10-12 RX ADMIN — PANTOPRAZOLE SODIUM 40 MG: 40 TABLET, DELAYED RELEASE ORAL at 07:51

## 2024-10-12 RX ADMIN — DULOXETINE HYDROCHLORIDE 30 MG: 30 CAPSULE, DELAYED RELEASE ORAL at 07:51

## 2024-10-12 RX ADMIN — FERROUS SULFATE TAB EC 325 MG (65 MG FE EQUIVALENT) 325 MG: 325 (65 FE) TABLET DELAYED RESPONSE at 07:51

## 2024-10-12 RX ADMIN — HEPARIN SODIUM 5000 UNITS: 5000 INJECTION INTRAVENOUS; SUBCUTANEOUS at 05:49

## 2024-10-12 RX ADMIN — Medication 1 TABLET: at 07:51

## 2024-10-12 NOTE — PROGRESS NOTES
End Of Shift Note  [facility] ICU  Summary of shift: Uneventful shift. Pt ambulatory to use restroom with no assist needed: 790mL u/o. No BM. Tylenol given at start if shift for headache, no further interventions needed. 's: no hydralazine given. AM magnesium 1.5, nephro notified: awaiting orders. K this AM 4.8. CPAP worn most of night, no episodes of desaturation.     Vitals:    Vitals:    10/12/24 0400 10/12/24 0500 10/12/24 0600 10/12/24 0601   BP: (!) 156/83      Pulse: 72 58 62 62   Resp: 15 10 10 11   Temp: 97.8 °F (36.6 °C)      TempSrc: Oral      SpO2: 99% 99% 97% 98%   Weight:       Height:            I&O:   Intake/Output Summary (Last 24 hours) at 10/12/2024 0611  Last data filed at 10/11/2024 2200  Gross per 24 hour   Intake 2691.85 ml   Output 2165 ml   Net 526.85 ml       Resp Status: RA/home CPAP    Ventilator Settings:     / / /     Critical Care IV infusions:   sodium chloride      dextrose      dextrose      sodium bicarbonate 150 mEq in dextrose 5 % 1,000 mL infusion 40 mL/hr at 10/11/24 2113        LDA:   Peripheral IV 10/10/24 Left Forearm (Active)   Number of days: 1

## 2024-10-12 NOTE — PROGRESS NOTES
Reason for Consult:  Acute kidney injury.    Requesting Physician:  No att. providers found    Assessment:  Acute kidney injury, hemodynamically related. Cr is slowly improving.  Underlying CKD stage 4 with baseline GFR of 20s ml/min.  Nephrotic syndrome - diabetic kidney disease.  History of hyponatremia.  AG metabolic acidosis, improving.  Hypertension.  Hypovitaminosis D.  Anemia of CKD.  Single R kidney since 2020.     Plan:  Agree with holding Bumex and Lisinopril, will restart as outpatient   Continue oral sodium bicarbonate 1300 mg po TID. Advised not to stop taking NaHCO3 pills in the future.   Continue low K diet with oral fluid restriction of 1200 ml/24 hours.  Continue vitamin D at 1000 iu once daily.  Monitor BP.  Avoid hypotension, nephrotoxic drugs, Lovenox and IV contrast exposure.  Plan for discharge and follow up with outpatient nephrology     Thank you for the consultation. Please do not hesitate to contact us for any further questions/concerns. We will continue to follow along with you.     HISTORY OF PRESENT ILLNESS:    The patient is a 64 y.o. male who has history of CKD stage 4 who normally follows with us for nephrology care, presents with elevated K and low CO2 on routine labs. He stopped taking his sodium bicarbonate pills 2 weeks ago believing it was causing him diarrhea. On previous labs his serum creatinines have been between 2.4 to 2.8 with eGFR of 20s ml/min. On admission his creatinine was elevated at 3.8 that has improved to 3.5 today. Denies any problems with nausea, vomiting, appetite, diarrhea or difficulty with urination. Denies any recent use of NSAIDs or iv contrast.    Review Of Systems:   Constitutional: No fever, chills, lethargy, weakness or wt loss.  HEENT:  No headache, nasal discharge or sore throat.  Cardiac:  No chest pain, dyspnea, orthopnea or PND.  Chest:              No cough, phlegm or wheezing.  Abdomen:  No abdominal pain, nausea, vomiting or diarrhea.  Neuro:  Insufficiently Active (6/3/2024)    Exercise Vital Sign     Days of Exercise per Week: 2 days     Minutes of Exercise per Session: 20 min   Stress: Not on file   Social Connections: Moderately Integrated (10/12/2024)    Social Connections ([facility] HRSN)     If for any reason you need help with day-to-day activities such as bathing, preparing meals, shopping, managing finances, etc., do you get the help you need?: Not on file   Intimate Partner Violence: Not on file   Housing Stability: Low Risk  (10/10/2024)    Housing Stability Vital Sign     Unable to Pay for Housing in the Last Year: No     Number of Times Moved in the Last Year: 1     Homeless in the Last Year: No       Family History   Problem Relation Age of Onset    Other Mother         AORTIC BYPASS LED TO KIDNEY FAILURE    Kidney Disease Mother     High Blood Pressure Father     Diabetes Father     Stroke Father     Stroke Maternal Grandmother     Stroke Maternal Grandfather          Physical Exam:  Vitals:    10/12/24 0800 10/12/24 1019 10/12/24 1120 10/12/24 1400   BP: (!) 161/88 (!) 161/88  (!) 147/75   Pulse: 79 64  65   Resp: 16   14   Temp: 98.4 °F (36.9 °C)  98.4 °F (36.9 °C)    TempSrc: Oral  Oral    SpO2: 100%      Weight:       Height:         I/O last 3 completed shifts:  In: 2691.9 [P.O.:535; I.V.:2106.9; IV Piggyback:49.9]  Out: 2965 [Urine:2965]    General:  Awake, alert, not in distress. Appears to be stated age.  HEENT: Atraumatic, normocephalic. Anicteric sclera. Pink and moist oral mucosa. No carotid bruit. No JVD.  Chest: Bilateral air entry, clear to auscultation, no wheezing, rhonchi or rales.  Cardiovascular: RRR, S1S2, no murmur, rub or gallop. Has lower extremity edema.    Abdomen: Soft, non tender to palpation. Active bowel sounds x 4 quadrants.  Musculoskeletal: Active ROM x 4 extremities. No cyanosis or clubbing.  Integumentary: Pink, warm and dry. Free from rash or lesions. Skin turgor normal.  CNS: Oriented to person, place and

## 2024-10-12 NOTE — DISCHARGE SUMMARY
[facility]  Office: [phone]  Aung Luis DO, Mansoor Cline DO, Josse Han DO, Gregory Archibald DO, Andrew Roberts MD, Mayra Medel MD, Ray Johnson MD, Romy Amador MD,  Mic Meraz MD, Rom Hayes MD, Casandra Washington MD,  Ely Burks DO, Dorie Anderson MD, Marcos Travis MD, Trey Luis DO, Delia Clemens MD,  Jorge Major DO, Gisel Herbert MD, Rose Yi MD, Celia Guillen MD, Matthew Hyatt MD,  Sammy Galvez MD, Mary Kay Karimi MD, Edson Hay MD, Mateo Turner MD, Stan Cline MD, Radha Hoskins MD, Mikael Cm DO, Eran Mckenzie MD, Shirley Waterhouse, CNP,  Missy Chowdhury, CNP, Mikael Cai, CNP,  Shantelle Monson, KIMI, Betty Boles, CNP, Yany Garcia, CNP, Cathie Law, CNP, Lynda Russell, CNP, An King PA-C, KARSON PortilloC, Gabby Vaughn, CNP, Saray Patton, CNP,  Lacey Wilkinson, CNP, Jaclyn Neff, CNP,  Martha Prince, CNP, Mariela Benedict, CNP         [facility]   IN-PATIENT SERVICE   [facility]    Discharge Summary     Patient ID: Prateek Denton II  :  1960   MRN: [id number]     ACCOUNT:  [id number]   Patient's PCP: Lisseth Coello APRN - CNP  Admit Date: 10/10/2024   Discharge Date: 10/12/2024     Length of Stay: 2  Code Status:  Full Code  Admitting Physician: Andrew Roberts MD  Discharge Physician: Andrew Roberts MD     Active Discharge Diagnoses:     Hospital Problem Lists:  Principal Problem:    Acute kidney injury superimposed on CKD (HCC)  Active Problems:    Essential hypertension    Type 2 diabetes mellitus with diabetic nephropathy, with long-term current use of insulin (HCC)    Hyperkalemia    Paroxysmal A-fib (HCC)    Hypomagnesemia  Resolved Problems:    * No resolved hospital problems. *      Admission Condition:  fair     Discharged Condition: stable    Hospital Stay:   Admitting history:  Prateek Denton FINN is a 64 y.o. Non- / non  male who

## 2024-10-12 NOTE — PLAN OF CARE
Problem: Chronic Conditions and Co-morbidities  Goal: Patient's chronic conditions and co-morbidity symptoms are monitored and maintained or improved  10/11/2024 2146 by Vani White RN  Outcome: Progressing  10/11/2024 1025 by Hina Olivares RN  Outcome: Progressing  Flowsheets (Taken 10/11/2024 0800)  Care Plan - Patient's Chronic Conditions and Co-Morbidity Symptoms are Monitored and Maintained or Improved: Monitor and assess patient's chronic conditions and comorbid symptoms for stability, deterioration, or improvement     Problem: Discharge Planning  Goal: Discharge to home or other facility with appropriate resources  10/11/2024 2146 by Vani White RN  Outcome: Progressing  10/11/2024 1025 by Hina Olivares RN  Outcome: Progressing  Flowsheets (Taken 10/11/2024 0800)  Discharge to home or other facility with appropriate resources: Identify barriers to discharge with patient and caregiver     Problem: Pain  Goal: Verbalizes/displays adequate comfort level or baseline comfort level  10/11/2024 2146 by Vani White RN  Outcome: Progressing  10/11/2024 1025 by Hina Olivares, RN  Outcome: Progressing     Problem: Safety - Adult  Goal: Free from fall injury  10/11/2024 2146 by Vani White RN  Outcome: Progressing  10/11/2024 1025 by Hina Olivares, RN  Outcome: Progressing

## 2024-10-12 NOTE — PROGRESS NOTES
Pt's discharge order in place, AVS updated with medication times and reviewed with pt, verbalized understanding, all questions asked. PIV removed w/o complications. Pt packed up all belongings, before leaving. RN wheeled out to main entrance, d/c home via private auto.

## 2024-10-12 NOTE — PROGRESS NOTES
Transitions of Care Pharmacy Service   Medication Review    The patient's list of current home medications has been reviewed.     Source(s) of information: Patient provided written medication list, [employer].    Based on information provided by the above source(s), I have updated the patient's home med list as described below.     I changed or updated the following medications on the patient's home medication list:  Removed [DISCONTINUED] Melatonin 10 MG TABS, Take 1 tablet by mouth nightly as needed    [DISCONTINUED] insulin glargine (LANTUS SOLOSTAR) 100 UNIT/ML injection pen, Inject 20-30 units of insulin subcutaneously once daily as directed (Patient not taking: Reported on 5/8/2024)  [DISCONTINUED] vitamin E 400 UNIT capsule, Take 1 capsule by mouth daily  [DISCONTINUED] Vitamin D (CHOLECALCIFEROL) 25 MCG (1000 UT) TABS tablet, Take 1 tablet by mouth daily  [DISCONTINUED] sodium bicarbonate 650 MG tablet, Take 1 tablet by mouth 3 times daily  [DISCONTINUED] ferrous sulfate (FE TABS 325) 325 (65 Fe) MG EC tablet, Take 1 tablet by mouth daily  Pantoprazole  Lisinopril  Bumetanide  Renetta-donn   Added dilTIAZem (CARDIZEM) 60 MG tablet, Take 1 tablet by mouth in the morning and at bedtime  omeprazole (PRILOSEC) 20 MG delayed release capsule, Take 1 capsule by mouth daily  magnesium oxide (MAG-OX) 400 (240 Mg) MG tablet, Take 1 tablet by mouth daily   Other Notes hydrOXYzine HCl (ATARAX) 50 MG tablet: patient reports taking 1 tablet twice daily instead of 1 tablet nightly.        PROVIDER ACTION REQUESTED  Medications that need to be addressed by a physician/nurse practitioner:    Medication Action Requested   Renetta-donn, bumetanide, ferrous sulfate, lisinopril Consider discontinuing medication(s) patient was no longer taking prior to admission if appropriate.   Diltiazem, Flonase Consider resuming medication(s) patient was taking prior to admission if appropriate.   Pantoprazole Consider adjusting dose(s) to match

## 2024-10-12 NOTE — PROGRESS NOTES
[facility]  Office: [phone]  Aung Luis DO, Mansoor Cline DO, Josse Han DO, Gregory Archibald DO, Andrew Roberts MD, Mayra Medel MD, Ray Johnson MD, Romy Amador MD,  Mic Meraz MD, Rom Hayes MD, Casandra Washington MD,  Ely Burks DO, Dorie Anderson MD, Marcos Travis MD, Trey Luis DO, Dleia Clemens MD,  Jorge Major DO, Gisel Herbert MD, Rose Yi MD, Celia Guillen MD, Matthew Hyatt MD,  Sammy Galvez MD, Mary Kay Karimi MD, Edson Hay MD, Mateo Turner MD, Stan Cline MD, Radha Hoskins MD, Mikael Cm DO, Eran Mckenzie MD, Shirley Waterhouse, CNP,  Missy Chowdhury CNP, Mikael Cai CNP,  Shantelle Monson, KIMI, Betty Boles, CNP, Yany Garcia, CNP, Cathie Law, CNP, Lynda Russell, CNP, KARSON TalamantesC, KARSON PortilloC, Gabby Vaughn, BRIT, Saray Patton, CNP,  Lacey Wilkinson, CNP, Jaclyn Neff, CNP,  Marhta Prince, BRIT, Mariela Benedict, CNP         [facility]   IN-PATIENT SERVICE   [facility]    Progress Note    10/12/2024    11:58 AM    Name:   Prateek Denton II  MRN:     [id number]     Acct:      [id number]   Room:   [facility]/1106-01   Day:  2  Admit Date:  10/10/2024  2:02 PM    PCP:   Lisseth Coello APRN - CNP  Code Status:  Full Code    Subjective:     C/C:   Chief Complaint   Patient presents with    Labs Only     PCP told him he had abnormal kidney lab values     Interval History Status: .   Potassium at admission was 6.5, improved to 4.8 with sodium bicarb drip   Current CO2 improved to 23  Magnesium 1.5 which is being replaced  He has single kidney and has been on oral sodium bicarbonate pills which he has stopped for the last 2 weeks thinking it was causing him diarrhea  Current creatinine 3.3, baseline 2.4  Denies any symptoms by himself  Brief History:     Prateek Denton II is a 64 y.o. Non- / non  male who presents with Labs Only (PCP told him he had abnormal

## 2024-10-23 ENCOUNTER — TELEPHONE (OUTPATIENT)
Dept: FAMILY MEDICINE CLINIC | Age: 64
End: 2024-10-23

## 2024-10-23 NOTE — TELEPHONE ENCOUNTER
Tried calling patient to see if he has had clearance from any of his specialists for his pending heel surgery.  Lisseth would like him to schedule with her if he has.  No answer on phone/voicemail full

## 2024-10-24 NOTE — PROGRESS NOTES
CLINICAL PHARMACY NOTE: MEDS TO BEDS    Total # of Prescriptions Filled: 2   The following medications were delivered to the patient:  Senna   oxycodone    Additional Documentation:  $ collected at pharmacy window- cash   PAST MEDICAL HISTORY:  Sjogrens syndrome

## 2024-10-25 DIAGNOSIS — J40 BRONCHITIS: ICD-10-CM

## 2024-10-25 DIAGNOSIS — J98.01 BRONCHOSPASM: ICD-10-CM

## 2024-10-25 DIAGNOSIS — R06.02 SOB (SHORTNESS OF BREATH): ICD-10-CM

## 2024-10-25 RX ORDER — ALBUTEROL SULFATE 90 UG/1
2 INHALANT RESPIRATORY (INHALATION) 4 TIMES DAILY PRN
Qty: 6.7 EACH | Refills: 0 | Status: SHIPPED | OUTPATIENT
Start: 2024-10-25

## 2024-10-30 RX ORDER — BLOOD-GLUCOSE SENSOR
EACH MISCELLANEOUS
Qty: 2 EACH | Refills: 5 | Status: SHIPPED | OUTPATIENT
Start: 2024-10-30

## 2024-10-30 NOTE — TELEPHONE ENCOUNTER
Patient called back to the office and stated that he was seen by his cardiologist for his clearance

## 2024-12-06 ENCOUNTER — PATIENT MESSAGE (OUTPATIENT)
Dept: FAMILY MEDICINE CLINIC | Age: 64
End: 2024-12-06

## 2024-12-08 RX ORDER — HYDROCHLOROTHIAZIDE 12.5 MG/1
CAPSULE ORAL
Qty: 6 EACH | Refills: 5 | Status: SHIPPED | OUTPATIENT
Start: 2024-12-08

## 2025-01-21 ENCOUNTER — HOSPITAL ENCOUNTER (INPATIENT)
Age: 65
LOS: 10 days | Discharge: HOME | DRG: 617 | End: 2025-01-31
Attending: EMERGENCY MEDICINE | Admitting: INTERNAL MEDICINE
Payer: MEDICARE

## 2025-01-21 ENCOUNTER — APPOINTMENT (OUTPATIENT)
Dept: GENERAL RADIOLOGY | Age: 65
DRG: 617 | End: 2025-01-21
Payer: MEDICARE

## 2025-01-21 DIAGNOSIS — E08.621 DIABETIC ULCER OF RIGHT HEEL ASSOCIATED WITH DIABETES MELLITUS DUE TO UNDERLYING CONDITION, LIMITED TO BREAKDOWN OF SKIN (HCC): Primary | ICD-10-CM

## 2025-01-21 DIAGNOSIS — L97.411 DIABETIC ULCER OF RIGHT HEEL ASSOCIATED WITH DIABETES MELLITUS DUE TO UNDERLYING CONDITION, LIMITED TO BREAKDOWN OF SKIN (HCC): Primary | ICD-10-CM

## 2025-01-21 DIAGNOSIS — I42.9 CARDIOMYOPATHY, UNSPECIFIED TYPE (HCC): ICD-10-CM

## 2025-01-21 DIAGNOSIS — M86.9 OSTEOMYELITIS OF GREAT TOE OF RIGHT FOOT: ICD-10-CM

## 2025-01-21 DIAGNOSIS — M86.9 TOE OSTEOMYELITIS, RIGHT: ICD-10-CM

## 2025-01-21 DIAGNOSIS — M86.171 ACUTE OSTEOMYELITIS OF RIGHT FOOT: ICD-10-CM

## 2025-01-21 LAB
ANION GAP SERPL CALCULATED.3IONS-SCNC: 12 MMOL/L (ref 9–16)
ANION GAP SERPL CALCULATED.3IONS-SCNC: 13 MMOL/L (ref 9–16)
BASOPHILS # BLD: 0.06 K/UL (ref 0–0.2)
BASOPHILS NFR BLD: 1 % (ref 0–2)
BUN SERPL-MCNC: 66 MG/DL (ref 8–23)
BUN SERPL-MCNC: 67 MG/DL (ref 8–23)
CALCIUM SERPL-MCNC: 9 MG/DL (ref 8.8–10.2)
CALCIUM SERPL-MCNC: 9.1 MG/DL (ref 8.8–10.2)
CHLORIDE SERPL-SCNC: 106 MMOL/L (ref 98–107)
CHLORIDE SERPL-SCNC: 108 MMOL/L (ref 98–107)
CO2 SERPL-SCNC: 15 MMOL/L (ref 20–31)
CO2 SERPL-SCNC: 17 MMOL/L (ref 20–31)
CREAT SERPL-MCNC: 4 MG/DL (ref 0.7–1.2)
CREAT SERPL-MCNC: 4.1 MG/DL (ref 0.7–1.2)
CRP SERPL HS-MCNC: 56.7 MG/L (ref 0–5)
EOSINOPHIL # BLD: 0.45 K/UL (ref 0–0.44)
EOSINOPHILS RELATIVE PERCENT: 4 % (ref 1–4)
ERYTHROCYTE [DISTWIDTH] IN BLOOD BY AUTOMATED COUNT: 12.8 % (ref 11.8–14.4)
ERYTHROCYTE [SEDIMENTATION RATE] IN BLOOD BY PHOTOMETRIC METHOD: 46 MM/HR (ref 0–20)
GFR, ESTIMATED: 15 ML/MIN/1.73M2
GFR, ESTIMATED: 16 ML/MIN/1.73M2
GLUCOSE SERPL-MCNC: 140 MG/DL (ref 82–115)
GLUCOSE SERPL-MCNC: 200 MG/DL (ref 82–115)
HCT VFR BLD AUTO: 35.7 % (ref 40.7–50.3)
HGB BLD-MCNC: 12 G/DL (ref 13–17)
IMM GRANULOCYTES # BLD AUTO: 0.11 K/UL (ref 0–0.3)
IMM GRANULOCYTES NFR BLD: 1 %
LACTATE BLDV-SCNC: 1.1 MMOL/L (ref 0.5–2.2)
LYMPHOCYTES NFR BLD: 1.47 K/UL (ref 1.1–3.7)
LYMPHOCYTES RELATIVE PERCENT: 13 % (ref 24–43)
MAGNESIUM SERPL-MCNC: 1.7 MG/DL (ref 1.6–2.4)
MCH RBC QN AUTO: 29.7 PG (ref 25.2–33.5)
MCHC RBC AUTO-ENTMCNC: 33.6 G/DL (ref 28.4–34.8)
MCV RBC AUTO: 88.4 FL (ref 82.6–102.9)
MONOCYTES NFR BLD: 0.88 K/UL (ref 0.1–1.2)
MONOCYTES NFR BLD: 8 % (ref 3–12)
NEUTROPHILS NFR BLD: 73 % (ref 36–65)
NEUTS SEG NFR BLD: 8.43 K/UL (ref 1.5–8.1)
NRBC BLD-RTO: 0 PER 100 WBC
PLATELET # BLD AUTO: 324 K/UL (ref 138–453)
PMV BLD AUTO: 12.2 FL (ref 8.1–13.5)
POTASSIUM SERPL-SCNC: 4.9 MMOL/L (ref 3.7–5.3)
POTASSIUM SERPL-SCNC: 5.8 MMOL/L (ref 3.7–5.3)
POTASSIUM SERPL-SCNC: 6.8 MMOL/L (ref 3.7–5.3)
RBC # BLD AUTO: 4.04 M/UL (ref 4.21–5.77)
SODIUM SERPL-SCNC: 136 MMOL/L (ref 136–145)
SODIUM SERPL-SCNC: 136 MMOL/L (ref 136–145)
WBC OTHER # BLD: 11.4 K/UL (ref 3.5–11.3)

## 2025-01-21 PROCEDURE — 85025 COMPLETE CBC W/AUTO DIFF WBC: CPT

## 2025-01-21 PROCEDURE — 85652 RBC SED RATE AUTOMATED: CPT

## 2025-01-21 PROCEDURE — 73630 X-RAY EXAM OF FOOT: CPT

## 2025-01-21 PROCEDURE — 93005 ELECTROCARDIOGRAM TRACING: CPT | Performed by: EMERGENCY MEDICINE

## 2025-01-21 PROCEDURE — 94761 N-INVAS EAR/PLS OXIMETRY MLT: CPT

## 2025-01-21 PROCEDURE — 1200000000 HC SEMI PRIVATE

## 2025-01-21 PROCEDURE — 99222 1ST HOSP IP/OBS MODERATE 55: CPT

## 2025-01-21 PROCEDURE — 6370000000 HC RX 637 (ALT 250 FOR IP): Performed by: EMERGENCY MEDICINE

## 2025-01-21 PROCEDURE — 6360000002 HC RX W HCPCS: Performed by: EMERGENCY MEDICINE

## 2025-01-21 PROCEDURE — 86140 C-REACTIVE PROTEIN: CPT

## 2025-01-21 PROCEDURE — 36415 COLL VENOUS BLD VENIPUNCTURE: CPT

## 2025-01-21 PROCEDURE — 87040 BLOOD CULTURE FOR BACTERIA: CPT

## 2025-01-21 PROCEDURE — 83605 ASSAY OF LACTIC ACID: CPT

## 2025-01-21 PROCEDURE — 80048 BASIC METABOLIC PNL TOTAL CA: CPT

## 2025-01-21 PROCEDURE — 2580000003 HC RX 258: Performed by: EMERGENCY MEDICINE

## 2025-01-21 PROCEDURE — 94640 AIRWAY INHALATION TREATMENT: CPT

## 2025-01-21 PROCEDURE — 99285 EMERGENCY DEPT VISIT HI MDM: CPT

## 2025-01-21 PROCEDURE — 83735 ASSAY OF MAGNESIUM: CPT

## 2025-01-21 PROCEDURE — 2500000003 HC RX 250 WO HCPCS: Performed by: EMERGENCY MEDICINE

## 2025-01-21 PROCEDURE — 84132 ASSAY OF SERUM POTASSIUM: CPT

## 2025-01-21 RX ORDER — INSULIN LISPRO 100 [IU]/ML
0-8 INJECTION, SOLUTION INTRAVENOUS; SUBCUTANEOUS
Status: DISCONTINUED | OUTPATIENT
Start: 2025-01-22 | End: 2025-01-31 | Stop reason: HOSPADM

## 2025-01-21 RX ORDER — OXYCODONE HYDROCHLORIDE 5 MG/1
5 TABLET ORAL EVERY 4 HOURS PRN
Status: DISCONTINUED | OUTPATIENT
Start: 2025-01-21 | End: 2025-01-31 | Stop reason: HOSPADM

## 2025-01-21 RX ORDER — BISACODYL 10 MG
10 SUPPOSITORY, RECTAL RECTAL DAILY PRN
Status: DISCONTINUED | OUTPATIENT
Start: 2025-01-21 | End: 2025-01-31 | Stop reason: HOSPADM

## 2025-01-21 RX ORDER — POLYETHYLENE GLYCOL 3350 17 G/17G
17 POWDER, FOR SOLUTION ORAL DAILY PRN
Status: DISCONTINUED | OUTPATIENT
Start: 2025-01-21 | End: 2025-01-31 | Stop reason: HOSPADM

## 2025-01-21 RX ORDER — ONDANSETRON 2 MG/ML
4 INJECTION INTRAMUSCULAR; INTRAVENOUS EVERY 6 HOURS PRN
Status: DISCONTINUED | OUTPATIENT
Start: 2025-01-21 | End: 2025-01-31 | Stop reason: HOSPADM

## 2025-01-21 RX ORDER — VITAMIN B COMPLEX
1000 TABLET ORAL DAILY
Status: DISCONTINUED | OUTPATIENT
Start: 2025-01-22 | End: 2025-01-31 | Stop reason: HOSPADM

## 2025-01-21 RX ORDER — ALBUTEROL SULFATE 0.83 MG/ML
10 SOLUTION RESPIRATORY (INHALATION) ONCE
Status: COMPLETED | OUTPATIENT
Start: 2025-01-21 | End: 2025-01-21

## 2025-01-21 RX ORDER — DULOXETIN HYDROCHLORIDE 30 MG/1
30 CAPSULE, DELAYED RELEASE ORAL DAILY
Status: DISCONTINUED | OUTPATIENT
Start: 2025-01-22 | End: 2025-01-31 | Stop reason: HOSPADM

## 2025-01-21 RX ORDER — OXYCODONE HYDROCHLORIDE 5 MG/1
10 TABLET ORAL EVERY 4 HOURS PRN
Status: DISCONTINUED | OUTPATIENT
Start: 2025-01-21 | End: 2025-01-31 | Stop reason: HOSPADM

## 2025-01-21 RX ORDER — TRAZODONE HYDROCHLORIDE 50 MG/1
50 TABLET, FILM COATED ORAL NIGHTLY
Status: DISCONTINUED | OUTPATIENT
Start: 2025-01-22 | End: 2025-01-31 | Stop reason: HOSPADM

## 2025-01-21 RX ORDER — SODIUM CHLORIDE 0.9 % (FLUSH) 0.9 %
5-40 SYRINGE (ML) INJECTION EVERY 12 HOURS SCHEDULED
Status: DISCONTINUED | OUTPATIENT
Start: 2025-01-22 | End: 2025-01-31 | Stop reason: HOSPADM

## 2025-01-21 RX ORDER — HYDROXYZINE HYDROCHLORIDE 25 MG/1
50 TABLET, FILM COATED ORAL 2 TIMES DAILY
Status: DISCONTINUED | OUTPATIENT
Start: 2025-01-22 | End: 2025-01-31 | Stop reason: HOSPADM

## 2025-01-21 RX ORDER — ACETAMINOPHEN 650 MG/1
650 SUPPOSITORY RECTAL EVERY 6 HOURS PRN
Status: DISCONTINUED | OUTPATIENT
Start: 2025-01-21 | End: 2025-01-31 | Stop reason: HOSPADM

## 2025-01-21 RX ORDER — HEPARIN SODIUM 5000 [USP'U]/ML
5000 INJECTION, SOLUTION INTRAVENOUS; SUBCUTANEOUS EVERY 8 HOURS SCHEDULED
Status: DISCONTINUED | OUTPATIENT
Start: 2025-01-22 | End: 2025-01-31 | Stop reason: HOSPADM

## 2025-01-21 RX ORDER — ONDANSETRON 4 MG/1
4 TABLET, ORALLY DISINTEGRATING ORAL EVERY 8 HOURS PRN
Status: DISCONTINUED | OUTPATIENT
Start: 2025-01-21 | End: 2025-01-31 | Stop reason: HOSPADM

## 2025-01-21 RX ORDER — CALCIUM GLUCONATE 20 MG/ML
1000 INJECTION, SOLUTION INTRAVENOUS ONCE
Status: COMPLETED | OUTPATIENT
Start: 2025-01-21 | End: 2025-01-21

## 2025-01-21 RX ORDER — SODIUM CHLORIDE 0.9 % (FLUSH) 0.9 %
5-40 SYRINGE (ML) INJECTION PRN
Status: DISCONTINUED | OUTPATIENT
Start: 2025-01-21 | End: 2025-01-31 | Stop reason: HOSPADM

## 2025-01-21 RX ORDER — PRAVASTATIN SODIUM 20 MG
20 TABLET ORAL DAILY
Status: DISCONTINUED | OUTPATIENT
Start: 2025-01-22 | End: 2025-01-31 | Stop reason: HOSPADM

## 2025-01-21 RX ORDER — DEXTROSE MONOHYDRATE 100 MG/ML
INJECTION, SOLUTION INTRAVENOUS CONTINUOUS PRN
Status: DISCONTINUED | OUTPATIENT
Start: 2025-01-21 | End: 2025-01-23 | Stop reason: SDUPTHER

## 2025-01-21 RX ORDER — DILTIAZEM HCL 60 MG
60 TABLET ORAL 2 TIMES DAILY
Status: DISCONTINUED | OUTPATIENT
Start: 2025-01-22 | End: 2025-01-31 | Stop reason: HOSPADM

## 2025-01-21 RX ORDER — DEXTROSE MONOHYDRATE 25 G/50ML
25 INJECTION, SOLUTION INTRAVENOUS ONCE
Status: COMPLETED | OUTPATIENT
Start: 2025-01-21 | End: 2025-01-21

## 2025-01-21 RX ORDER — METOPROLOL TARTRATE 100 MG/1
100 TABLET ORAL 2 TIMES DAILY
Status: DISCONTINUED | OUTPATIENT
Start: 2025-01-22 | End: 2025-01-31 | Stop reason: HOSPADM

## 2025-01-21 RX ORDER — ACETAMINOPHEN 325 MG/1
650 TABLET ORAL EVERY 6 HOURS PRN
Status: DISCONTINUED | OUTPATIENT
Start: 2025-01-21 | End: 2025-01-31 | Stop reason: HOSPADM

## 2025-01-21 RX ORDER — SODIUM CHLORIDE 9 MG/ML
INJECTION, SOLUTION INTRAVENOUS PRN
Status: DISCONTINUED | OUTPATIENT
Start: 2025-01-21 | End: 2025-01-31 | Stop reason: HOSPADM

## 2025-01-21 RX ADMIN — CALCIUM GLUCONATE 1000 MG: 20 INJECTION, SOLUTION INTRAVENOUS at 21:25

## 2025-01-21 RX ADMIN — DEXTROSE MONOHYDRATE 25 G: 25 INJECTION, SOLUTION INTRAVENOUS at 21:22

## 2025-01-21 RX ADMIN — CEFEPIME 2000 MG: 2 INJECTION, POWDER, FOR SOLUTION INTRAVENOUS at 20:34

## 2025-01-21 RX ADMIN — VANCOMYCIN HYDROCHLORIDE 2500 MG: 5 INJECTION, POWDER, LYOPHILIZED, FOR SOLUTION INTRAVENOUS at 21:41

## 2025-01-21 RX ADMIN — SODIUM ZIRCONIUM CYCLOSILICATE 5 G: 5 POWDER, FOR SUSPENSION ORAL at 22:02

## 2025-01-21 RX ADMIN — INSULIN HUMAN 5 UNITS: 100 INJECTION, SOLUTION PARENTERAL at 21:19

## 2025-01-21 RX ADMIN — ALBUTEROL SULFATE 10 MG: 2.5 SOLUTION RESPIRATORY (INHALATION) at 21:13

## 2025-01-21 ASSESSMENT — PAIN DESCRIPTION - ORIENTATION: ORIENTATION: RIGHT

## 2025-01-21 ASSESSMENT — PAIN SCALES - GENERAL: PAINLEVEL_OUTOF10: 7

## 2025-01-21 ASSESSMENT — PAIN DESCRIPTION - LOCATION: LOCATION: KNEE;LEG

## 2025-01-21 ASSESSMENT — PAIN - FUNCTIONAL ASSESSMENT: PAIN_FUNCTIONAL_ASSESSMENT: 0-10

## 2025-01-21 ASSESSMENT — PAIN DESCRIPTION - DESCRIPTORS: DESCRIPTORS: CRAMPING;CRUSHING;DISCOMFORT

## 2025-01-22 ENCOUNTER — APPOINTMENT (OUTPATIENT)
Dept: MRI IMAGING | Age: 65
DRG: 617 | End: 2025-01-22
Payer: MEDICARE

## 2025-01-22 ENCOUNTER — APPOINTMENT (OUTPATIENT)
Age: 65
DRG: 617 | End: 2025-01-22
Attending: EMERGENCY MEDICINE
Payer: MEDICARE

## 2025-01-22 LAB
ANION GAP SERPL CALCULATED.3IONS-SCNC: 12 MMOL/L (ref 9–16)
BASOPHILS # BLD: 0.03 K/UL (ref 0–0.2)
BASOPHILS NFR BLD: 0 % (ref 0–2)
BILIRUB UR QL STRIP: NEGATIVE
BUN SERPL-MCNC: 63 MG/DL (ref 8–23)
CALCIUM SERPL-MCNC: 9 MG/DL (ref 8.8–10.2)
CHLORIDE SERPL-SCNC: 109 MMOL/L (ref 98–107)
CHLORIDE UR-SCNC: 98 MMOL/L
CK SERPL-CCNC: 239 U/L (ref 39–308)
CK SERPL-CCNC: 266 U/L (ref 39–308)
CLARITY UR: CLEAR
CO2 SERPL-SCNC: 17 MMOL/L (ref 20–31)
COLOR UR: YELLOW
CREAT SERPL-MCNC: 3.9 MG/DL (ref 0.7–1.2)
CREAT UR-MCNC: 72.7 MG/DL (ref 39–259)
CRP SERPL HS-MCNC: 54.7 MG/L (ref 0–5)
ECHO AO ROOT DIAM: 3.4 CM
ECHO AO ROOT INDEX: 1.39 CM/M2
ECHO AV PEAK GRADIENT: 4 MMHG
ECHO AV PEAK VELOCITY: 1 M/S
ECHO BSA: 2.53 M2
ECHO LA AREA 4C: 19.6 CM2
ECHO LA DIAMETER INDEX: 1.48 CM/M2
ECHO LA DIAMETER: 3.6 CM
ECHO LA MAJOR AXIS: 6.3 CM
ECHO LA TO AORTIC ROOT RATIO: 1.06
ECHO LA VOL MOD A4C: 48 ML (ref 18–58)
ECHO LA VOLUME INDEX MOD A4C: 20 ML/M2 (ref 16–34)
ECHO LV E' LATERAL VELOCITY: 9.03 CM/S
ECHO LV E' SEPTAL VELOCITY: 7.62 CM/S
ECHO LV EDV A4C: 158 ML
ECHO LV EDV INDEX A4C: 65 ML/M2
ECHO LV EJECTION FRACTION A4C: 58 %
ECHO LV EJECTION FRACTION BIPLANE: 58 % (ref 55–100)
ECHO LV ESV A4C: 66 ML
ECHO LV ESV INDEX A4C: 27 ML/M2
ECHO LV FRACTIONAL SHORTENING: 28 % (ref 28–44)
ECHO LV INTERNAL DIMENSION DIASTOLE INDEX: 2.21 CM/M2
ECHO LV INTERNAL DIMENSION DIASTOLIC: 5.4 CM (ref 4.2–5.9)
ECHO LV INTERNAL DIMENSION SYSTOLIC INDEX: 1.6 CM/M2
ECHO LV INTERNAL DIMENSION SYSTOLIC: 3.9 CM
ECHO LV IVSD: 1.3 CM (ref 0.6–1)
ECHO LV MASS 2D: 249.4 G (ref 88–224)
ECHO LV MASS INDEX 2D: 102.2 G/M2 (ref 49–115)
ECHO LV POSTERIOR WALL DIASTOLIC: 1 CM (ref 0.6–1)
ECHO LV RELATIVE WALL THICKNESS RATIO: 0.37
ECHO MV A VELOCITY: 0.84 M/S
ECHO MV E DECELERATION TIME (DT): 169 MS
ECHO MV E VELOCITY: 0.69 M/S
ECHO MV E/A RATIO: 0.82
ECHO MV E/E' LATERAL: 7.64
ECHO MV E/E' RATIO (AVERAGED): 8.35
ECHO MV E/E' SEPTAL: 9.06
EOSINOPHIL # BLD: 0.31 K/UL (ref 0–0.44)
EOSINOPHILS RELATIVE PERCENT: 3 % (ref 1–4)
EPI CELLS #/AREA URNS HPF: NORMAL /HPF (ref 0–5)
ERYTHROCYTE [DISTWIDTH] IN BLOOD BY AUTOMATED COUNT: 12.5 % (ref 11.8–14.4)
ERYTHROCYTE [SEDIMENTATION RATE] IN BLOOD BY PHOTOMETRIC METHOD: 70 MM/HR (ref 0–20)
GFR, ESTIMATED: 16 ML/MIN/1.73M2
GLUCOSE BLD-MCNC: 114 MG/DL (ref 75–110)
GLUCOSE BLD-MCNC: 147 MG/DL (ref 75–110)
GLUCOSE BLD-MCNC: 147 MG/DL (ref 75–110)
GLUCOSE BLD-MCNC: 148 MG/DL (ref 75–110)
GLUCOSE SERPL-MCNC: 112 MG/DL (ref 82–115)
GLUCOSE UR STRIP-MCNC: ABNORMAL MG/DL
HCT VFR BLD AUTO: 32.1 % (ref 40.7–50.3)
HGB BLD-MCNC: 10.7 G/DL (ref 13–17)
HGB UR QL STRIP.AUTO: ABNORMAL
IMM GRANULOCYTES # BLD AUTO: 0.09 K/UL (ref 0–0.3)
IMM GRANULOCYTES NFR BLD: 1 %
KETONES UR STRIP-MCNC: NEGATIVE MG/DL
LEUKOCYTE ESTERASE UR QL STRIP: NEGATIVE
LYMPHOCYTES NFR BLD: 1.97 K/UL (ref 1.1–3.7)
LYMPHOCYTES RELATIVE PERCENT: 19 % (ref 24–43)
MCH RBC QN AUTO: 29.5 PG (ref 25.2–33.5)
MCHC RBC AUTO-ENTMCNC: 33.3 G/DL (ref 28.4–34.8)
MCV RBC AUTO: 88.4 FL (ref 82.6–102.9)
MONOCYTES NFR BLD: 1.02 K/UL (ref 0.1–1.2)
MONOCYTES NFR BLD: 10 % (ref 3–12)
NEUTROPHILS NFR BLD: 67 % (ref 36–65)
NEUTS SEG NFR BLD: 7.21 K/UL (ref 1.5–8.1)
NITRITE UR QL STRIP: NEGATIVE
NRBC BLD-RTO: 0 PER 100 WBC
PH UR STRIP: 6 [PH] (ref 5–8)
PLATELET # BLD AUTO: 263 K/UL (ref 138–453)
PMV BLD AUTO: 10.3 FL (ref 8.1–13.5)
POTASSIUM SERPL-SCNC: 5.8 MMOL/L (ref 3.7–5.3)
POTASSIUM, UR: 42.6 MMOL/L
PROT UR STRIP-MCNC: ABNORMAL MG/DL
RBC # BLD AUTO: 3.63 M/UL (ref 4.21–5.77)
RBC #/AREA URNS HPF: NORMAL /HPF (ref 0–2)
SODIUM SERPL-SCNC: 137 MMOL/L (ref 136–145)
SODIUM UR-SCNC: 98 MMOL/L
SP GR UR STRIP: 1.02 (ref 1–1.03)
UROBILINOGEN UR STRIP-ACNC: NORMAL EU/DL (ref 0–1)
WBC #/AREA URNS HPF: NORMAL /HPF (ref 0–5)
WBC OTHER # BLD: 10.6 K/UL (ref 3.5–11.3)

## 2025-01-22 PROCEDURE — 97535 SELF CARE MNGMENT TRAINING: CPT

## 2025-01-22 PROCEDURE — 97166 OT EVAL MOD COMPLEX 45 MIN: CPT

## 2025-01-22 PROCEDURE — 84300 ASSAY OF URINE SODIUM: CPT

## 2025-01-22 PROCEDURE — 6370000000 HC RX 637 (ALT 250 FOR IP)

## 2025-01-22 PROCEDURE — 86140 C-REACTIVE PROTEIN: CPT

## 2025-01-22 PROCEDURE — 6370000000 HC RX 637 (ALT 250 FOR IP): Performed by: EMERGENCY MEDICINE

## 2025-01-22 PROCEDURE — 87186 SC STD MICRODIL/AGAR DIL: CPT

## 2025-01-22 PROCEDURE — 87185 SC STD ENZYME DETCJ PER NZM: CPT

## 2025-01-22 PROCEDURE — 97530 THERAPEUTIC ACTIVITIES: CPT

## 2025-01-22 PROCEDURE — 6370000000 HC RX 637 (ALT 250 FOR IP): Performed by: INTERNAL MEDICINE

## 2025-01-22 PROCEDURE — 2580000003 HC RX 258: Performed by: NURSE PRACTITIONER

## 2025-01-22 PROCEDURE — 82570 ASSAY OF URINE CREATININE: CPT

## 2025-01-22 PROCEDURE — 2580000003 HC RX 258: Performed by: INTERNAL MEDICINE

## 2025-01-22 PROCEDURE — 87075 CULTR BACTERIA EXCEPT BLOOD: CPT

## 2025-01-22 PROCEDURE — 82436 ASSAY OF URINE CHLORIDE: CPT

## 2025-01-22 PROCEDURE — 6360000002 HC RX W HCPCS: Performed by: NURSE PRACTITIONER

## 2025-01-22 PROCEDURE — 87070 CULTURE OTHR SPECIMN AEROBIC: CPT

## 2025-01-22 PROCEDURE — 80048 BASIC METABOLIC PNL TOTAL CA: CPT

## 2025-01-22 PROCEDURE — 6370000000 HC RX 637 (ALT 250 FOR IP): Performed by: NURSE PRACTITIONER

## 2025-01-22 PROCEDURE — 73721 MRI JNT OF LWR EXTRE W/O DYE: CPT

## 2025-01-22 PROCEDURE — 36415 COLL VENOUS BLD VENIPUNCTURE: CPT

## 2025-01-22 PROCEDURE — 82550 ASSAY OF CK (CPK): CPT

## 2025-01-22 PROCEDURE — 97162 PT EVAL MOD COMPLEX 30 MIN: CPT

## 2025-01-22 PROCEDURE — 2580000003 HC RX 258

## 2025-01-22 PROCEDURE — 6360000002 HC RX W HCPCS

## 2025-01-22 PROCEDURE — 73718 MRI LOWER EXTREMITY W/O DYE: CPT

## 2025-01-22 PROCEDURE — 82947 ASSAY GLUCOSE BLOOD QUANT: CPT

## 2025-01-22 PROCEDURE — 81001 URINALYSIS AUTO W/SCOPE: CPT

## 2025-01-22 PROCEDURE — 87076 CULTURE ANAEROBE IDENT EACH: CPT

## 2025-01-22 PROCEDURE — 6360000002 HC RX W HCPCS: Performed by: EMERGENCY MEDICINE

## 2025-01-22 PROCEDURE — 84133 ASSAY OF URINE POTASSIUM: CPT

## 2025-01-22 PROCEDURE — 1200000000 HC SEMI PRIVATE

## 2025-01-22 PROCEDURE — 85025 COMPLETE CBC W/AUTO DIFF WBC: CPT

## 2025-01-22 PROCEDURE — 87205 SMEAR GRAM STAIN: CPT

## 2025-01-22 PROCEDURE — 2500000003 HC RX 250 WO HCPCS

## 2025-01-22 PROCEDURE — 99232 SBSQ HOSP IP/OBS MODERATE 35: CPT | Performed by: INTERNAL MEDICINE

## 2025-01-22 PROCEDURE — 86403 PARTICLE AGGLUT ANTBDY SCRN: CPT

## 2025-01-22 PROCEDURE — 85652 RBC SED RATE AUTOMATED: CPT

## 2025-01-22 PROCEDURE — C8929 TTE W OR WO FOL WCON,DOPPLER: HCPCS

## 2025-01-22 PROCEDURE — 2500000003 HC RX 250 WO HCPCS: Performed by: INTERNAL MEDICINE

## 2025-01-22 RX ORDER — HYDROCHLOROTHIAZIDE 25 MG/1
25 TABLET ORAL ONCE
Status: COMPLETED | OUTPATIENT
Start: 2025-01-22 | End: 2025-01-22

## 2025-01-22 RX ORDER — SODIUM BICARBONATE 650 MG/1
650 TABLET ORAL 3 TIMES DAILY
Status: DISCONTINUED | OUTPATIENT
Start: 2025-01-22 | End: 2025-01-31 | Stop reason: HOSPADM

## 2025-01-22 RX ORDER — FLUDROCORTISONE ACETATE 0.1 MG/1
0.1 TABLET ORAL ONCE
Status: COMPLETED | OUTPATIENT
Start: 2025-01-22 | End: 2025-01-22

## 2025-01-22 RX ADMIN — SODIUM BICARBONATE 650 MG: 650 TABLET ORAL at 21:20

## 2025-01-22 RX ADMIN — CEFEPIME 1000 MG: 1 INJECTION, POWDER, FOR SOLUTION INTRAMUSCULAR; INTRAVENOUS at 10:57

## 2025-01-22 RX ADMIN — METOPROLOL 100 MG: 100 TABLET ORAL at 21:20

## 2025-01-22 RX ADMIN — SODIUM CHLORIDE, PRESERVATIVE FREE 10 ML: 5 INJECTION INTRAVENOUS at 09:41

## 2025-01-22 RX ADMIN — PERFLUTREN 1.5 ML: 6.52 INJECTION, SUSPENSION INTRAVENOUS at 15:22

## 2025-01-22 RX ADMIN — SODIUM ZIRCONIUM CYCLOSILICATE 10 G: 10 POWDER, FOR SUSPENSION ORAL at 09:39

## 2025-01-22 RX ADMIN — SODIUM BICARBONATE: 84 INJECTION, SOLUTION INTRAVENOUS at 10:59

## 2025-01-22 RX ADMIN — ONDANSETRON 4 MG: 2 INJECTION, SOLUTION INTRAMUSCULAR; INTRAVENOUS at 09:44

## 2025-01-22 RX ADMIN — SODIUM BICARBONATE 650 MG: 650 TABLET ORAL at 14:33

## 2025-01-22 RX ADMIN — FLUDROCORTISONE ACETATE 0.1 MG: 0.1 TABLET ORAL at 09:56

## 2025-01-22 RX ADMIN — DILTIAZEM HYDROCHLORIDE 60 MG: 60 TABLET ORAL at 01:08

## 2025-01-22 RX ADMIN — DILTIAZEM HYDROCHLORIDE 60 MG: 60 TABLET ORAL at 09:40

## 2025-01-22 RX ADMIN — CEFEPIME 1000 MG: 1 INJECTION, POWDER, FOR SOLUTION INTRAMUSCULAR; INTRAVENOUS at 21:31

## 2025-01-22 RX ADMIN — HEPARIN SODIUM 5000 UNITS: 5000 INJECTION INTRAVENOUS; SUBCUTANEOUS at 14:33

## 2025-01-22 RX ADMIN — TRAZODONE HYDROCHLORIDE 50 MG: 50 TABLET ORAL at 21:20

## 2025-01-22 RX ADMIN — HYDROXYZINE HYDROCHLORIDE 50 MG: 25 TABLET ORAL at 09:40

## 2025-01-22 RX ADMIN — DAPTOMYCIN 600 MG: 500 INJECTION, POWDER, LYOPHILIZED, FOR SOLUTION INTRAVENOUS at 22:39

## 2025-01-22 RX ADMIN — METOPROLOL 100 MG: 100 TABLET ORAL at 09:40

## 2025-01-22 RX ADMIN — OXYCODONE HYDROCHLORIDE 10 MG: 5 TABLET ORAL at 22:30

## 2025-01-22 RX ADMIN — OXYCODONE HYDROCHLORIDE 10 MG: 5 TABLET ORAL at 01:08

## 2025-01-22 RX ADMIN — PRAVASTATIN SODIUM 20 MG: 20 TABLET ORAL at 09:40

## 2025-01-22 RX ADMIN — SODIUM CHLORIDE, PRESERVATIVE FREE 10 ML: 5 INJECTION INTRAVENOUS at 21:20

## 2025-01-22 RX ADMIN — TRAZODONE HYDROCHLORIDE 50 MG: 50 TABLET ORAL at 01:08

## 2025-01-22 RX ADMIN — HYDROCHLOROTHIAZIDE 25 MG: 25 TABLET ORAL at 09:39

## 2025-01-22 RX ADMIN — SODIUM BICARBONATE 650 MG: 650 TABLET ORAL at 09:40

## 2025-01-22 RX ADMIN — Medication 1000 UNITS: at 21:20

## 2025-01-22 RX ADMIN — HEPARIN SODIUM 5000 UNITS: 5000 INJECTION INTRAVENOUS; SUBCUTANEOUS at 21:19

## 2025-01-22 RX ADMIN — OXYCODONE HYDROCHLORIDE 10 MG: 5 TABLET ORAL at 18:22

## 2025-01-22 RX ADMIN — HYDROXYZINE HYDROCHLORIDE 50 MG: 25 TABLET ORAL at 21:19

## 2025-01-22 RX ADMIN — DILTIAZEM HYDROCHLORIDE 60 MG: 60 TABLET ORAL at 21:20

## 2025-01-22 ASSESSMENT — PAIN DESCRIPTION - ONSET
ONSET: ON-GOING
ONSET: ON-GOING

## 2025-01-22 ASSESSMENT — PAIN DESCRIPTION - PAIN TYPE
TYPE: ACUTE PAIN
TYPE: ACUTE PAIN

## 2025-01-22 ASSESSMENT — PAIN SCALES - GENERAL
PAINLEVEL_OUTOF10: 2
PAINLEVEL_OUTOF10: 2
PAINLEVEL_OUTOF10: 5
PAINLEVEL_OUTOF10: 7
PAINLEVEL_OUTOF10: 7
PAINLEVEL_OUTOF10: 3
PAINLEVEL_OUTOF10: 7
PAINLEVEL_OUTOF10: 5

## 2025-01-22 ASSESSMENT — PAIN DESCRIPTION - LOCATION
LOCATION: FOOT
LOCATION: FOOT
LOCATION: FOOT;LEG

## 2025-01-22 ASSESSMENT — PAIN DESCRIPTION - DESCRIPTORS
DESCRIPTORS: DISCOMFORT
DESCRIPTORS: ACHING;THROBBING

## 2025-01-22 ASSESSMENT — PAIN DESCRIPTION - ORIENTATION
ORIENTATION: RIGHT

## 2025-01-22 ASSESSMENT — PAIN DESCRIPTION - FREQUENCY
FREQUENCY: CONTINUOUS
FREQUENCY: CONTINUOUS

## 2025-01-22 ASSESSMENT — PAIN - FUNCTIONAL ASSESSMENT: PAIN_FUNCTIONAL_ASSESSMENT: ACTIVITIES ARE NOT PREVENTED

## 2025-01-22 NOTE — PLAN OF CARE
Problem: Chronic Conditions and Co-morbidities  Goal: Patient's chronic conditions and co-morbidity symptoms are monitored and maintained or improved  1/22/2025 1456 by Jillian Hortno RN  Outcome: Progressing  Flowsheets (Taken 1/22/2025 0250 by Ashtyn Casarez, RN)  Care Plan - Patient's Chronic Conditions and Co-Morbidity Symptoms are Monitored and Maintained or Improved:   Monitor and assess patient's chronic conditions and comorbid symptoms for stability, deterioration, or improvement   Collaborate with multidisciplinary team to address chronic and comorbid conditions and prevent exacerbation or deterioration   Update acute care plan with appropriate goals if chronic or comorbid symptoms are exacerbated and prevent overall improvement and discharge  1/22/2025 0250 by Ashtyn Casarez RN  Outcome: Progressing  Flowsheets (Taken 1/22/2025 0250)  Care Plan - Patient's Chronic Conditions and Co-Morbidity Symptoms are Monitored and Maintained or Improved:   Monitor and assess patient's chronic conditions and comorbid symptoms for stability, deterioration, or improvement   Collaborate with multidisciplinary team to address chronic and comorbid conditions and prevent exacerbation or deterioration   Update acute care plan with appropriate goals if chronic or comorbid symptoms are exacerbated and prevent overall improvement and discharge     Problem: Discharge Planning  Goal: Discharge to home or other facility with appropriate resources  1/22/2025 1456 by Jillian Horton RN  Outcome: Progressing  Flowsheets (Taken 1/22/2025 0015 by Ashtyn Casarez, RN)  Discharge to home or other facility with appropriate resources:   Identify barriers to discharge with patient and caregiver   Arrange for interpreters to assist at discharge as needed   Arrange for needed discharge resources and transportation as appropriate   Refer to discharge planning if patient needs post-hospital services based on

## 2025-01-22 NOTE — PROGRESS NOTES
Physical Therapy  Facility/Department: [facility] SURG   Physical Therapy Initial Evaluation    Patient Name: Prateek Denton II        MRN: [id number]    : 1960    Date of Service: 2025  RN Jillian & RN Dimple report patient is medically stable for therapy treatment this date.  Chart reviewed prior to treatment and patient is agreeable for therapy.  All lines intact and patient positioned comfortably at end of treatment.  All patient needs addressed prior to ending therapy session.      Chief Complaint   Patient presents with    Leg Swelling    Knee Pain    Leg Pain    Cough     Past Medical History:  has a past medical history of Acute blood loss anemia, Arthritis, Back pain, Cellulitis, CKD (chronic kidney disease) stage 3, GFR 30-59 ml/min (HCC), Colostomy RUQ, Frequent headaches, Gastrointestinal hemorrhage with melena, Gout, Hemorrhagic shock (HCC), Hernia of abdominal wall, History of atrial fibrillation, History of blood transfusion, [place] (hard of hearing), HTN, Hyperkalemia, Hyperlipidemia, Incisional hernia, Kidney infection, Large bowel obstruction (HCC), Morbid obesity due to excess calories, Paroxysmal A-fib (HCC), SBO (small bowel obstruction) (HCC), Sepsis (HCC), Single kidney, Sleep apnea, T2DM, Tooth missing, Under care of team, Upper GI bleed, Wears glasses, and Wellness examination.  Past Surgical History:  has a past surgical history that includes Ankle surgery (Right, ); Carpal tunnel release (Bilateral); Cystoscopy (Right, 2020); hc cath power picc triple (2020); Kidney surgery (Left, 2020); Small intestine surgery (N/A, 2020); laparotomy (N/A, 2020); Tonsillectomy; Knee arthroscopy (Bilateral); Adenoidectomy; cysto/uretero/pyeloscopy, calculus tx (Right, 10/30/2020); colostomy; Small intestine surgery (N/A, 2020); Tympanostomy tube placement; Upper gastrointestinal endoscopy (N/A, 2022); Colonoscopy (N/A, 2023); Upper gastrointestinal  concerns, No hearing aid    Objective  Orientation  Overall Orientation Status: Within Functional Limits  Cognition  Overall Cognitive Status: WFL  Safety Judgement: Decreased awareness of need for safety, Decreased awareness of need for assistance  Insights: Decreased awareness of deficits    Observation/Palpation  Posture: Fair  Observation: BMI > 39; RLE ace wrapped    AROM RLE (degrees)  RLE AROM: WFL  AROM LLE (degrees)  LLE AROM : WFL  AROM RUE (degrees)  RUE AROM : WFL  AROM LUE (degrees)  LUE AROM : WFL    Strength RLE  Strength RLE: WFL  Comment: Grossly 4 to 4+/5  Strength LLE  Strength LLE: WFL  Comment: Grossly 4+ to 5/5  Strength RUE  Strength RUE: WFL  Strength LUE  Strength LUE: WFL    Mobility   Bed mobility  Supine to Sit: Stand by assistance  Sit to Supine: Stand by assistance  Scooting: Stand by assistance  Bed Mobility Comments: Pt w/o significant difficulty throughout bed mobility this date.  HOB elevated.  Pt denying any dizziness/lightheadedness w/ position changes.    Transfers  Sit to Stand: Stand by assistance  Stand to Sit: Stand by assistance  Comment: Pt performed multiple STS transfers throughout session this date demonstrating fair steadiness throughout.  Pt denying any dizziness/lightheadedness w/ position changes.    Ambulation  Surface: Level tile  Device: No Device  Assistance: Stand by assistance;Contact guard assistance  Quality of Gait: fair steadiness, slow pace, antalgic gait  Gait Deviations: Slow Susu;Decreased step length;Decreased step height  Distance: 50ft x 2; 10ft x 2  Comments: Pt demonstrating fair steadiness throughout ambulation w/o AD.  Pt w/ antalgic gait noted however pt reporting this has been baseline d/t R heel wound.  More Ambulation?: No    Stairs/Curb  Stairs?: No    Balance   Balance  Posture: Fair  Sitting - Static: Good  Sitting - Dynamic: Good, -  Standing - Static: Fair, +  Standing - Dynamic: Fair  Single Leg Stance R Le  Single Leg Stance L

## 2025-01-22 NOTE — CONSULTS
Pharmacy dosing of initial vancomycin ordered by ED provider.    Wt Readings from Last 1 Encounters:   01/21/25 124.7 kg (275 lb)       2500 mg ordered x 1.    Pharmacy is waiting to see if vancomycin therapy is continued or stopped/changed by the admitting physician.

## 2025-01-22 NOTE — H&P
Take 1 tablet by mouth daily   Yes Souleymane Limon MD   traZODone (DESYREL) 50 MG tablet TAKE 1 TABLET BY MOUTH EVERY DAY AT NIGHT 9/12/24  Yes Lisseth Coello APRN - CNP   pravastatin (PRAVACHOL) 20 MG tablet TAKE 1 TABLET BY MOUTH EVERY DAY 9/4/24  Yes Lisseth Coello APRN - CNP   metoprolol (LOPRESSOR) 100 MG tablet Take 1 tablet by mouth 2 times daily 11/3/21  Yes Souleymane Limon MD   Continuous Glucose Sensor (FREESTYLE RANJIT 3 PLUS SENSOR) MISC Wear continuous for glucose monitoring 12/8/24   Lisseth Coello APRN - CNP   albuterol sulfate HFA (PROVENTIL;VENTOLIN;PROAIR) 108 (90 Base) MCG/ACT inhaler INHALE 2 PUFFS INTO THE LUNGS 4 TIMES DAILY AS NEEDED FOR WHEEZING OR SHORTNESS OF BREATH.  Patient not taking: Reported on 1/21/2025 10/25/24   Lisseth Coello APRN - CNP   ferrous sulfate (FE TABS 325) 325 (65 Fe) MG EC tablet Take 1 tablet by mouth daily  Patient not taking: Reported on 1/21/2025 10/12/24   Andrew Roberts MD   hydrOXYzine HCl (ATARAX) 50 MG tablet TAKE 1 TABLET BY MOUTH EVERY DAY AT NIGHT  Patient taking differently: Take 1 tablet by mouth 2 times daily 9/4/24   Lisseth Coello APRN - CNP   DULoxetine (CYMBALTA) 30 MG extended release capsule Take 1 capsule by mouth daily 6/18/24 9/16/24  Lisseth Coello APRN - CNP   fluticasone (FLONASE) 50 MCG/ACT nasal spray 2 sprays by Nasal route in the morning and at bedtime  Patient not taking: Reported on 1/21/2025 1/29/24   Souleymane Limon MD   Azelastine HCl 137 MCG/SPRAY SOLN 2 sprays by Nasal route in the morning and at bedtime  Patient not taking: Reported on 1/21/2025 1/29/24   Souleymane Limon MD   Insulin Pen Needle ([employer] PEN NEEDLES 31G) 31G X 8 MM MISC Use to inject insulin up to 4 times daily  Patient not taking: Reported on 1/21/2025 6/26/23   Lisseth Coello, APRN - CNP   Lancets MISC 1 each by Does not apply route 3 times daily  Patient not taking: Reported on 1/21/2025 1/3/23   Oumou,  TORREY Yanes CNP   Blood Glucose Monitoring Suppl ([employer] BLOOD GLUCOSE METER) w/Device KIT 1 each by Does not apply route in the morning and at bedtime 11/15/22   Lisseth Coello APRN - CNP   CPAP Machine MISC use as directed    Provider, MD Souleymane   Respiratory Therapy Supplies (CARETOUCH CPAP & BIPAP HOSE) MISC Mask and Tubing 20   Provider, Souleymane, MD        Allergies:     Ampicillin, Pcn [penicillins], Sulfa antibiotics, and Tape [adhesive tape]    Social History:     Tobacco:    reports that he has never smoked. He quit smokeless tobacco use about 45 years ago.  His smokeless tobacco use included chew.  Alcohol:      reports current alcohol use of about 2.0 standard drinks of alcohol per week.  Drug Use:  reports current drug use. Frequency: 1.00 time per week. Drug: Marijuana (Weed).    Family History:     Family History   Problem Relation Age of Onset    Other Mother         AORTIC BYPASS LED TO KIDNEY FAILURE    Kidney Disease Mother     High Blood Pressure Father     Diabetes Father     Stroke Father     Stroke Maternal Grandmother     Stroke Maternal Grandfather        Review of Systems:     Positive and Negative as described in HPI.    Review of Systems    Physical Exam:   BP (!) 150/85   Pulse (!) 102   Temp 97 °F (36.1 °C) (Oral)   Resp 16   Ht 1.803 m (5' 11\")   Wt 127.8 kg (281 lb 11.2 oz)   SpO2 100%   BMI 39.29 kg/m²   Temp (24hrs), Av.6 °F (36.4 °C), Min:97 °F (36.1 °C), Max:98.1 °F (36.7 °C)    No results for input(s): \"POCGLU\" in the last 72 hours.    Intake/Output Summary (Last 24 hours) at 2025 0229  Last data filed at 2025 2114  Gross per 24 hour   Intake 100 ml   Output --   Net 100 ml       Physical Exam  Constitutional:       Appearance: Elderly gentleman, lying exam bed, with right leg swelling and malodorous ulcerations in right foot.  HENT:      Head: Normocephalic.   Eyes:      Conjunctiva/sclera: Conjunctivae normal.   Pulmonary:      No

## 2025-01-22 NOTE — CARE COORDINATION
Case Management Assessment  Initial Evaluation    Date/Time of Evaluation: 1/22/2025 12:02 PM  Assessment Completed by: Martha Garcia RN    If patient is discharged prior to next notation, then this note serves as note for discharge by case management.    Patient Name: Prateek Denton II                   YOB: 1960  Diagnosis: Toe osteomyelitis, right [M86.9]  Diabetic ulcer of right heel associated with diabetes mellitus due to underlying condition, limited to breakdown of skin (HCC) [E08.621, L97.411]  Osteomyelitis of great toe of right foot [M86.9]                   Date / Time: 1/21/2025  6:27 PM    Patient Admission Status: Inpatient   Readmission Risk (Low < 19, Mod (19-27), High > 27): Readmission Risk Score: 17.5    Current PCP: Lisseth Coello, APRN - CNP  PCP verified by CM? Yes    Chart Reviewed: Yes      History Provided by: Patient, Spouse  Patient Orientation: Alert and Oriented    Patient Cognition: Alert    Hospitalization in the last 30 days (Readmission):  No    If yes, Readmission Assessment in CM Navigator will be completed.    Advance Directives:      Code Status: Full Code   Patient's Primary Decision Maker is: Named in Scanned ACP Document    Primary Decision Maker: Valencia Denton - Spouse - [phone]    Discharge Planning:    Patient lives with: Spouse/Significant Other Type of Home: House  Primary Care Giver: Self  Patient Support Systems include: Spouse/Significant Other, Children, Family Members, Friends/Neighbors   Current Financial resources: Medicare  Current community resources: None  Current services prior to admission: Durable Medical Equipment            Current DME: Walker, Cane, Cpap, Shower Chair, Glucometer            Type of Home Care services:  OT, PT, Nursing Services    ADLS  Prior functional level: Independent in ADLs/IADLs  Current functional level: Independent in ADLs/IADLs    PT AM-PAC:   /24  OT AM-PAC:   /24    Family can provide assistance at

## 2025-01-22 NOTE — CONSULTS
Consultation Note  Foot and Ankle Surgery     Subjective     Chief Complaint: Right hallux and posterior heel wound    HPI:  Prateek Denton II is a 65 y.o. male diabetic patient seen at [facility] for foot wounds to the right lower extremity.  The patient presented to the ED on 1/21/2025 with a chief complaint of leg swelling, knee pain, leg pain and cough.  He was then admitted to medicine for management of osteomyelitis of the right hallux.  Patient states that approximately 2 weeks ago he noticed an ulcer on his right heel and odor noticed a wound to the right hallux about a month ago.  Patient states he has previously seen Dr. Del Valle for routine foot care.  Patient has a history of diabetic foot ulcers and [facility]  Patient states the wounds progressively became worse and have recently become malodorous.  Patient reports local pain that he rates a 6 out of 10.  He describes the pain as constant and dull.  Patient denies any recent injury or trauma.  Patient denies constitutional symptoms at this time but states a few weeks ago he was experiencing nausea and chills.  Patient's labs in the ED were significant for CRP of 56.7, ESR 46 and WBC of 11.4.  Patient was afebrile upon presentation.  Patient has a past medical history significant for CKD with a unilateral kidney as of 2020, diabetes mellitus type 2, paroxysmal A-fib not on blood thinning medication due to previous GI bleed, large bowel obstruction status post colostomy with reversal, chronic back pain and headaches, HTN, HLD, obstructive LISA.  Patient's most recent hemoglobin A1c was 6.2% on 10/10/2024.  Patient states he has no known issues with peripheral blood flow and has not had any vascular intervention to his knowledge.  Patient states he did have an Achilles tendon repair in 2010 at an outside facility.  Patient is a community ambulator and walks unassisted at baseline. Podiatry was consulted for

## 2025-01-22 NOTE — PROGRESS NOTES
Occupational Therapy  Occupational Therapy Initial Evaluation  Facility/Department: [facility]   Patient Name: Prateek Denton II        MRN: [id number]    : 1960    Date of Service: 2025    CARMELO Walsh/Dimple reports patient is medically stable for therapy treatment this date.    Chart reviewed prior to treatment and patient is agreeable for therapy.  All lines intact and patient positioned comfortably at end of treatment.  All patient needs addressed prior to ending therapy session.      Discharge Recommendations  Discharge Recommendations: Patient would benefit from continued therapy after discharge  Due to recent hospitalization and medical condition, pt would benefit from additional intermittent skilled therapy at time of discharge.  Please refer to the AM-PAC score for current functional status.     OT Equipment Recommendations  Equipment Needed: Yes  Mobility Devices: ADL Assistive Devices  ADL Assistive Devices: Emergency Alert System    Chief Complaint   Patient presents with    Leg Swelling    Knee Pain    Leg Pain    Cough     PER H&P: Prateek Denton II is a 65 y.o. Non- / non  male who presents with Leg Swelling, Knee Pain, Leg Pain, and Cough   and is admitted to the hospital for the management of Toe osteomyelitis, right.     Patient is a 65-year-old independent male with a past medical history of CKD with a unilateral kidney as of  , diabetes type 2, paroxysmal A-fib not on blood thinning medication due to previous GI bleeding, large bowel obstruction status post colostomy with reversal, chronic back pain and headaches, hypertension, hyperlipidemia,obstructive LISA.     Patient arrives to the emergency department stating that for approximately 2 weeks he has noticed a ulcer on his right heel and bottom of right great toe.  He has had diabetic foot ulcers previously on the left foot and was given meta honey at home.  He attempted to use this without any relief.  Wounds  1: ADL transfers and functional mobility to Mod I with use of AD as needed.  Short Term Goal 2: bed mobility to Mod I with use of bedrails as needed.  Short Term Goal 3: increased B UE strength by 1/2 grade to assist with functional tasks/I with B UE HEP with use of handouts as needed.  Short Term Goal 4: toileting to Mod I with use of AD/grab bars as needed.  Short Term Goal 5: UB ADLs Set up and LB ADLs to SBA with use of AD/AE as needed.  Long Term Goals  Long Term Goal 1: Pt/caregiver to be I with fall prevention edu, EC/WS tech, recommendations for discharge/AE, condition specific edu, pressure relief/skin integrity edu with use of handouts as needed.      Plan  Occupational Therapy Plan  Times Per Week: 4-5x/wk 1x/day as netta  Current Treatment Recommendations: Strengthening, Balance training, Functional mobility training, Endurance training, Safety education & training, Self-Care / ADL, Home management training, Equipment evaluation, education, & procurement, Patient/Caregiver education & training      AM-[facility] Daily Activities Inpatient  AM-PAC Daily Activity - Inpatient   How much help is needed for putting on and taking off regular lower body clothing?: A Lot  How much help is needed for bathing (which includes washing, rinsing, drying)?: A Lot  How much help is needed for toileting (which includes using toilet, bedpan, or urinal)?: A Little  How much help is needed for putting on and taking off regular upper body clothing?: A Little  How much help is needed for taking care of personal grooming?: A Little  How much help for eating meals?: None  AM-[facility] Inpatient Daily Activity Raw Score: 17  AM-PAC Inpatient ADL T-Scale Score : 37.26  ADL Inpatient CMS 0-100% Score: 50.11  ADL Inpatient CMS G-Code Modifier : CK      Minutes  OT Individual Minutes  Time In: 1539  Time Out: 1603  Minutes: 24  Tx time: 8 min      Electronically signed by Aiyana NOLAND OT on 1/22/25 at 4:25 PM EST

## 2025-01-22 NOTE — PROGRESS NOTES
Wound Ostomy Continence Nursing      NAME:  Prateek Denton II  MEDICAL RECORD NUMBER:  [id number]  AGE: 65 y.o.   GENDER: male  : 1960  TODAY'S DATE:  2025    [facility] nursing consult noted. Podiatry service on case and managing wound care needs at this time. Will defer eval and treat and sign off case. Please call or reconsult if further needs arise. Thank you.    The [facility] nursing team can be reached during working hours Monday-Friday [phone] via [employer] by searching \"wound ostomy\" under groups and choosing the [facility] option. Sending messages via individual names will not reach a clinician.

## 2025-01-22 NOTE — PLAN OF CARE
Problem: Chronic Conditions and Co-morbidities  Goal: Patient's chronic conditions and co-morbidity symptoms are monitored and maintained or improved  Outcome: Progressing  Flowsheets (Taken 1/22/2025 0250)  Care Plan - Patient's Chronic Conditions and Co-Morbidity Symptoms are Monitored and Maintained or Improved:   Monitor and assess patient's chronic conditions and comorbid symptoms for stability, deterioration, or improvement   Collaborate with multidisciplinary team to address chronic and comorbid conditions and prevent exacerbation or deterioration   Update acute care plan with appropriate goals if chronic or comorbid symptoms are exacerbated and prevent overall improvement and discharge     Problem: Discharge Planning  Goal: Discharge to home or other facility with appropriate resources  Outcome: Progressing  Flowsheets (Taken 1/22/2025 0015)  Discharge to home or other facility with appropriate resources:   Identify barriers to discharge with patient and caregiver   Arrange for interpreters to assist at discharge as needed   Arrange for needed discharge resources and transportation as appropriate   Refer to discharge planning if patient needs post-hospital services based on physician order or complex needs related to functional status, cognitive ability or social support system   Identify discharge learning needs (meds, wound care, etc)     Problem: Pain  Goal: Verbalizes/displays adequate comfort level or baseline comfort level  Outcome: Progressing  Flowsheets (Taken 1/22/2025 0250)  Verbalizes/displays adequate comfort level or baseline comfort level:   Encourage patient to monitor pain and request assistance   Administer analgesics based on type and severity of pain and evaluate response   Consider cultural and social influences on pain and pain management   Assess pain using appropriate pain scale   Implement non-pharmacological measures as appropriate and evaluate response     Problem: Safety -

## 2025-01-22 NOTE — ED NOTES
Patient arrived to ED with c/o right great toe pain accompanied with right leg swelling. Per patient report he has had this wound as well as the wound on his right heel for approximately 3 weeks with no progression in healing. Patient denies trauma or injury to the areas. Patient reports that he is able to ambulate on his foot however he does experience pain and discomfort with ambulation. Patient does not utilize assistive devices.     Patient has history of stage four kidney disease with type 2 diabetes that is uncontrolled. Patient is not compliant with checking his sugars on a regular basis.     Patient denies SOB, his respirations are equal and non-labored with no use of accessory muscles. Patient does not shows signs of respiratory distress. Patient reports having a persistent non-productive cough for a \"couple months.\" Patient denies chest pain. Patient is A&Ox4, his speech is clear and he is able to express his needs.

## 2025-01-22 NOTE — CONSULTS
[facility]    Shaukat Rashid MD Zafar Magsi MD Danial Rashid MD John O. Ranker MD Sembria Ligibel, BRIT       Nephrology Consultation Note    Reason for Consult:    Date of Service: 01/22/25   PCP: Lisseth Coello, TORREY - CNP       Reason for Consult     Acute kidney injury  Hyperkalemia    Assessment and Plan     65 years old male patient with a past medical history significant for hypertension hyperlipidemia diabetes diabetic nephropathy status post left nephrectomy, presented with a chief complaints of right toe osteomyelitis.,  Apparently 2 weeks ago he noticed an ulcer on the right heel and bottom of the right great toe, despite local wound care, wound became worse, x-ray of the right foot shows osteomyelitis, patient was admitted to hospital medicine, podiatry consulted.  Nephrology was consulted for acute kidney injury and hyperkalemia.    Assessment  Acute on chronic kidney disease, baseline creatinine around 3 -3.5, GFR 20-24.  History of left nephrectomy, most likely prerenal component, rule out obstruction  Hyperkalemia  Metabolic acidosis  Volume: Appears euvolemic  Hypertension: Stable  Hemodynamics: No recent echo  Anemia of CKD  CKD MBD  Osteomyelitis of distal first phalanx  Diabetes mellitus type 2, last hemoglobin A1c 6.2 from October 2024  Atrial fibrillation  Hyperlipidemia  Obstructive sleep apnea  Anxiety depression.    Plan  Urinalysis, urine electrolytes  Insulin dextrose Lokelma, will give 1 dose of hydrochlorothiazide and fludrocortisone  Noted patient on isotonic bicarb drip, will continue for today  Check renal ultrasound  Check echocardiogram  Avoid nephrotoxins, hemodynamic instability and hypotension  Avoid contrast studies unless absolutely necessary  Dose all medication for GFR    Thank you for this consultation, we will follow along with you regarding care of this patient while the patient is here in the hospital, please call if you

## 2025-01-22 NOTE — ACP (ADVANCE CARE PLANNING)
Advance Care Planning     Advance Care Planning Activator (Inpatient)  Conversation Note      Date of ACP Conversation: 1/22/2025     Conversation Conducted with: Patient with Decision Making Capacity    ACP Activator: Martha Garcia RN    {When Decision Maker makes decisions on behalf of the incapacitated patient: Decision Maker is asked to consider and make decisions based on patient values, known preferences, or best interests.     Health Care Decision Maker:     Current Designated Health Care Decision Maker:     Primary Decision Maker: Valencia Denton - [facility] - [phone]  Click here to complete Healthcare Decision Makers including section of the Healthcare Decision Maker Relationship (ie \"Primary\")      Care Preferences    Ventilation:  \"If you were in your present state of health and suddenly became very ill and were unable to breathe on your own, what would your preference be about the use of a ventilator (breathing machine) if it were available to you?\"      Would the patient desire the use of ventilator (breathing machine)?: yes    \"If your health worsens and it becomes clear that your chance of recovery is unlikely, what would your preference be about the use of a ventilator (breathing machine) if it were available to you?\"     Would the patient desire the use of ventilator (breathing machine)?: No      Resuscitation  \"CPR works best to restart the heart when there is a sudden event, like a heart attack, in someone who is otherwise healthy. Unfortunately, CPR does not typically restart the heart for people who have serious health conditions or who are very sick.\"    \"In the event your heart stopped as a result of an underlying serious health condition, would you want attempts to be made to restart your heart (answer \"yes\" for attempt to resuscitate) or would you prefer a natural death (answer \"no\" for do not attempt to resuscitate)?\" yes       [] Yes   [x] No   Educated Patient / Decision Maker

## 2025-01-22 NOTE — CONSULTS
[facility]   Pharmacy Pharmacokinetic Monitoring Service - Vancomycin     Prateek Denton II is a 65 y.o. male starting on vancomycin therapy for SSTI,Toe osteomyelitis. Pharmacy consulted by Lacey Wilkinson APRN - CNP  for monitoring and adjustment.    Target Concentration: Dosing based on anticipated concentration <15 mg/L due to renal impairment/insufficiency    Additional Antimicrobials: cefepime    Pertinent Laboratory Values:   Wt Readings from Last 1 Encounters:   01/21/25 127.8 kg (281 lb 11.2 oz)     Temp Readings from Last 1 Encounters:   01/21/25 97 °F (36.1 °C) (Oral)     Estimated Creatinine Clearance: 24 mL/min (A) (based on SCr of 4.1 mg/dL (H)).  Recent Labs     01/21/25  1844 01/21/25  1947 01/21/25  2144   CREATININE  --  4.0* 4.1*   BUN  --  67* 66*   WBC 11.4*  --   --      Procalcitonin: ---    Pertinent Cultures:  Culture Date Source Results   1/21/25 Blood x2 No growth <24hrs, pending   MRSA Nasal Swab: N/A. Non-respiratory infection.    Plan:  Concentration-guided dosing due to renal impairment/insufficiency  Start vancomycin 2500mg x1 loading dose  Draw level in ~36 hours or sooner if improvement in renal function  Renal labs as indicated   Vancomycin concentration ordered for 1/23/25 @ 0600   Pharmacy will continue to monitor patient and adjust therapy as indicated    Thank you for the consult,  DANIEL LO RPH  1/22/2025 2:14 AM

## 2025-01-22 NOTE — CONSULTS
[facility]   Pharmacy Consult - Daptomycin dosing     Prateek Denton II is a 65 y.o. male starting on daptomycin therapy for right great toe distal aspect ulceration with associated osteomyelitis.   Pharmacy consulted by Vishal Escalera CNP for dosing.    Additional Antimicrobials: cefepime    Pertinent Laboratory Values:   Wt Readings from Last 1 Encounters:   01/21/25 127.8 kg (281 lb 11.2 oz)     Temp Readings from Last 1 Encounters:   01/22/25 97.9 °F (36.6 °C)     Estimated Creatinine Clearance: 25 mL/min (A) (based on SCr of 3.9 mg/dL (H)).  Recent Labs     01/21/25  1844 01/21/25  2144 01/22/25  0516 01/22/25  0630   CREATININE  --  4.1*  --  3.9*   BUN  --  66*  --  63*   WBC 11.4*  --  10.6  --     < > = values in this interval not displayed.     Pertinent Cultures:      Plan:  Daptomycin 600mg Q48H (6.23 mg/kg dose using adjusted body weight)  Pravastatin put on hold while patient is on daptomycin  Creatine Kinase ordered weekly while on daptomycin  Pharmacy will continue to monitor patient and adjust dose as needed    BMI=39.29    Dosing Weight  BMI < 30 kg/m2  Dose Based on Actual Body Weight   BMI >= 30 kg/m2 AdjBW (kg) = [(ABW-IBW) x 0.4] + IBW     Recommended Dose: Round dose to nearest 50 mg   Indication CrCl >=30 mL/min CrCl <30 mL/min   Skin and Skin Structure Infections 4 - 6 mg/kg once daily  4 - 6 mg/kg q48h   Septic Arthritis  6 mg/kg once daily  6 mg/kg q48h   Diabetic Foot Infections 6 mg/kg once daily  6 mg/kg q48h   Osteomyelitis 6 - 10 mg/kg once daily  6 - 10 mg/kg q48h   Prosthetic Joint Infections 6 - 10 mg/kg once daily  6 - 10 mg/kg q48h   MRSA/MRSE bacteremia  8 - 10 mg/kg once daily  8 - 10 mg/kg q48h   MRSA endocarditis  8 - 10 mg/kg once daily 8 - 10 mg/kg q48h   VRE bacteremia 10 - 12 mg/kg once daily  10 - 12 mg/kg q48h   VRE endocarditis 10 - 12 mg/kg once daily  10 - 12 mg/kg q48h   CNS Infections 6 - 10 mg/kg once daily  6 - 10 mg/kg q48h        Thank

## 2025-01-22 NOTE — ED PROVIDER NOTES
EMERGENCY DEPARTMENT ENCOUNTER    Pt Name: Prateek Denton II  MRN: [id number]  Birthdate 1960  Date of evaluation: 1/21/25  CHIEF COMPLAINT       Chief Complaint   Patient presents with    Leg Swelling    Knee Pain    Leg Pain    Cough     HISTORY OF PRESENT ILLNESS   The history is provided by the patient and medical records.  The patient is a 65-year-old male with history of insulin-dependent diabetes, paroxysmal atrial fibrillation, CKD, hypertension, and hyperlipidemia who presents to the ED for right foot ulcers and leg swelling.  Symptoms started gradually 2 to 3 weeks ago.  Wife states that his diabetes is poorly controlled.  He states he was under the care of a podiatrist last November for an ulcer involving his left heel.    REVIEW OF SYSTEMS     Review of Systems  All other systems reviewed and are negative.    PASTMEDICAL HISTORY     Past Medical History:   Diagnosis Date    Acute blood loss anemia 01/05/2022    Arthritis     BILATERAL KNEESand right shoulder    Back pain     Cellulitis 07/14/2015    CKD (chronic kidney disease) stage 3, GFR 30-59 ml/min ([facility])     Dr. Barnes [facility] last visit 4/2024    Colostomy RUQ 09/23/2020    Frequent headaches     10/28/2020 PATIENT STATES EVERY OTHER DAY.    Gastrointestinal hemorrhage with melena 01/05/2022    Gout     Hemorrhagic shock ([facility]) 06/21/2023    Hernia of abdominal wall     Dr. Doty    History of atrial fibrillation     AFTER SURGERY ON 09/23/2020 WENT INTO A FIB. CONVERTED BACK TO NORMAL RHYTHM ON HIS OWN. CARDIOLOGIST DR STEIN    History of blood transfusion     NO REACTION    [place] of hearing[place]     HTN     Hyperkalemia     Hyperlipidemia     Incisional hernia     Kidney infection     Large bowel obstruction ([facility]) 08/31/2020    Morbid obesity due to excess calories 10/04/2017    Paroxysmal A-fib ([facility]) 03/12/2024    Dr. Cagle [facility] last visit 4/2024    SBO (small bowel obstruction) ([facility]) 04/17/2023    Sepsis

## 2025-01-22 NOTE — CONSULTS
[facility]  Initial Consult Note  Date: 1/22/2025    Hospital day :1     Impression:   Right great toe distal aspect diabetic foot ulceration with associated osteomyelitis on MRI  Right posterior heel diabetic foot ulceration  Diabetes mellitus type 2 with peripheral neuropathy  Atrial fibrillation  Acute on chronic kidney disease stage IV  Single right kidney since 2020  Hypertension  Hyperlipidemia    Recommendations   Patient can continue on cefepime and daptomycin  We will avoid vancomycin due to the kidney disease  MRI does show osteomyelitis of the right great toe, MRI of the ankle is pending  Patient will likely need surgical intervention during this hospitalization, await plans per the podiatry team  Blood cultures have been sent  Bedside debridement was performed by the podiatry team and RN states cultures were obtained  We will continue to follow clinical progress, culture to, and adjust therapy accordingly    Chief complaint/reason for consultation:   Right foot wounds     History of Present Illness:   Prateek Denton II is a 65 y.o.-year-old male who was initially admitted on 1/21/2025.  Patient has a known history of chronic kidney disease stage IV, left-sided nephrectomy in 2020, diabetes mellitus with peripheral neuropathy, atrial fibrillation, hypertension, hyperlipidemia.  Patient does report seeing podiatry quite some time ago for a left foot plantar aspect wound, which she used Medihoney and that went away.    Patient presented to the emergency department 1/21/2025 with complaints of right lower extremity swelling related to wounds on his foot.  Patient states that he has had a wound on his right heel for several months but about 2 weeks ago he noticed that the right lower extremity was becoming more red and swollen in addition to this particular wound draining.  He started using Medihoney at that time.  Patient also has an ulcer on the distal aspect of his right great toe  Blood Updated: 01/21/25 2226     Specimen Description .BLOOD     Special Requests LAC UNK MLS     Culture NO GROWTH <24 HRS            Electronically signed by TORREY GÓMEZ CNP on 1/22/2025 at 8:51 AM      [facility]  TORREY GÓMEZ CNP  [employer] messaging  OFFICE: [phone]    Thank you for allowing us to participate in the care of this patient. Please call with questions.     This note is created with the assistance of a speech recognition program.  While intending to generate a document that actually reflects the content of the visit, the document can still have some errors including those of syntax and sound a like substitutions which may escape proof reading.  In such instances, actual meaning can be extrapolated by contextual diversion.

## 2025-01-22 NOTE — PROGRESS NOTES
Patient admitted to room 2011 From ER via bed with belongings.   VS as charted  Patient oriented to room and call light.   Assessment as charted.   Admission database done.   Orders and plan of care reviewed.   Call light in reach. Will continue to monitor.

## 2025-01-22 NOTE — PROGRESS NOTES
[facility]  Office: [phone]  Aung Luis DO, Mansoor Cline DO, Josse Han DO, Gregory Archibald DO, Andrew Roberts MD, Mayra Medel MD, Ray Johnson MD, Romy Amador MD,  Mic Meraz MD, Rom Hayes MD, Casandra Washington MD,  Ely Burks DO, Dorie Anderson MD, Marcos Travis MD, Trey Luis DO, Delia Clemens MD,  Jorge Major DO, Gisel Herbert MD, Rose Yi MD, Celia Guillen MD, Matthew Hyatt MD,  Sammy Galvez MD, Mary Kay Karimi MD, Edson Hay MD, Mateo Turner MD, Stan Cline MD, Radha Hoskins MD, Mikael Cm DO, Eran Mckenzie MD, Ely Blackman MD, Mohsin Reza, MD, Shirley Waterhouse, CNP,  Missy Chowdhury CNP, Mikael Cai, CNP,  Shantelle Monson, DNP, Betty Boles, CNP, Yany Garcia, CNP, Cathie Law, CNP, Lynda Russell, CNP, An King, PA-C, Zoraida Sherman PA-C, Gabby Vaughn, CNP, Saray Patton, CNP,  Lacey Wilkinson, CNP, Nidhi Sinha, CNP, Jaclyn Neff, CNP,  Patsy Peterson, CNS, Zulay Ramsay, CNP, Martha Prince, CNP,   Mariela Benedict, CNP         [facility]   IN-PATIENT SERVICE   [facility]    Progress Note    1/22/2025    8:02 AM    Name:   Prateek Denton II  MRN:     [id number]     Acct:      [id number]   Room:   2011/2011-02  IP Day:  1  Admit Date:  1/21/2025  6:27 PM    PCP:   Lisseth Coello, TORREY - CNP  Code Status:  Full Code    Subjective:     C/C:   Chief Complaint   Patient presents with    Leg Swelling    Knee Pain    Leg Pain    Cough     Interval History Status: improved.     Patient is resting in bed and denies any complaints of chest pain, shortness of breath, nausea vomiting, fevers chills or acute complaints    Brief History:     This is a 65-year-old male with longstanding diabetes that presents with a 2-week history of right heel ulcer as well as great toe ulcer.  Wounds have been worsening with increasing pain and subsequently presented emergency room last evening.  He has

## 2025-01-23 ENCOUNTER — APPOINTMENT (OUTPATIENT)
Dept: ULTRASOUND IMAGING | Age: 65
DRG: 617 | End: 2025-01-23
Payer: MEDICARE

## 2025-01-23 PROBLEM — E08.621 DIABETIC ULCER OF RIGHT HEEL ASSOCIATED WITH DIABETES MELLITUS DUE TO UNDERLYING CONDITION, LIMITED TO BREAKDOWN OF SKIN (HCC): Status: ACTIVE | Noted: 2025-01-23

## 2025-01-23 PROBLEM — L97.411 DIABETIC ULCER OF RIGHT HEEL ASSOCIATED WITH DIABETES MELLITUS DUE TO UNDERLYING CONDITION, LIMITED TO BREAKDOWN OF SKIN (HCC): Status: ACTIVE | Noted: 2025-01-23

## 2025-01-23 LAB
ANION GAP SERPL CALCULATED.3IONS-SCNC: 12 MMOL/L (ref 9–16)
BASOPHILS # BLD: 0.04 K/UL (ref 0–0.2)
BASOPHILS NFR BLD: 1 % (ref 0–2)
BUN SERPL-MCNC: 57 MG/DL (ref 8–23)
CALCIUM SERPL-MCNC: 9.3 MG/DL (ref 8.8–10.2)
CHLORIDE SERPL-SCNC: 104 MMOL/L (ref 98–107)
CO2 SERPL-SCNC: 20 MMOL/L (ref 20–31)
CREAT SERPL-MCNC: 3.7 MG/DL (ref 0.7–1.2)
CRP SERPL HS-MCNC: 75 MG/L (ref 0–5)
EOSINOPHIL # BLD: 0.29 K/UL (ref 0–0.44)
EOSINOPHILS RELATIVE PERCENT: 4 % (ref 1–4)
ERYTHROCYTE [DISTWIDTH] IN BLOOD BY AUTOMATED COUNT: 12.6 % (ref 11.8–14.4)
ERYTHROCYTE [SEDIMENTATION RATE] IN BLOOD BY PHOTOMETRIC METHOD: 73 MM/HR (ref 0–20)
GFR, ESTIMATED: 17 ML/MIN/1.73M2
GLUCOSE BLD-MCNC: 131 MG/DL (ref 75–110)
GLUCOSE BLD-MCNC: 133 MG/DL (ref 75–110)
GLUCOSE BLD-MCNC: 145 MG/DL (ref 75–110)
GLUCOSE BLD-MCNC: 174 MG/DL (ref 75–110)
GLUCOSE BLD-MCNC: 187 MG/DL (ref 75–110)
GLUCOSE SERPL-MCNC: 146 MG/DL (ref 82–115)
HCT VFR BLD AUTO: 32.7 % (ref 40.7–50.3)
HGB BLD-MCNC: 10.8 G/DL (ref 13–17)
IMM GRANULOCYTES # BLD AUTO: 0.06 K/UL (ref 0–0.3)
IMM GRANULOCYTES NFR BLD: 1 %
LYMPHOCYTES NFR BLD: 1.07 K/UL (ref 1.1–3.7)
LYMPHOCYTES RELATIVE PERCENT: 14 % (ref 24–43)
MCH RBC QN AUTO: 29.1 PG (ref 25.2–33.5)
MCHC RBC AUTO-ENTMCNC: 33 G/DL (ref 28.4–34.8)
MCV RBC AUTO: 88.1 FL (ref 82.6–102.9)
MONOCYTES NFR BLD: 0.74 K/UL (ref 0.1–1.2)
MONOCYTES NFR BLD: 10 % (ref 3–12)
NEUTROPHILS NFR BLD: 70 % (ref 36–65)
NEUTS SEG NFR BLD: 5.4 K/UL (ref 1.5–8.1)
NRBC BLD-RTO: 0 PER 100 WBC
PLATELET # BLD AUTO: 254 K/UL (ref 138–453)
PMV BLD AUTO: 10.4 FL (ref 8.1–13.5)
POTASSIUM SERPL-SCNC: 5.9 MMOL/L (ref 3.7–5.3)
RBC # BLD AUTO: 3.71 M/UL (ref 4.21–5.77)
SODIUM SERPL-SCNC: 136 MMOL/L (ref 136–145)
WBC OTHER # BLD: 7.6 K/UL (ref 3.5–11.3)

## 2025-01-23 PROCEDURE — 85025 COMPLETE CBC W/AUTO DIFF WBC: CPT

## 2025-01-23 PROCEDURE — 2500000003 HC RX 250 WO HCPCS

## 2025-01-23 PROCEDURE — 80048 BASIC METABOLIC PNL TOTAL CA: CPT

## 2025-01-23 PROCEDURE — 6370000000 HC RX 637 (ALT 250 FOR IP): Performed by: EMERGENCY MEDICINE

## 2025-01-23 PROCEDURE — 6370000000 HC RX 637 (ALT 250 FOR IP): Performed by: NURSE PRACTITIONER

## 2025-01-23 PROCEDURE — 99232 SBSQ HOSP IP/OBS MODERATE 35: CPT | Performed by: HOSPITALIST

## 2025-01-23 PROCEDURE — 86140 C-REACTIVE PROTEIN: CPT

## 2025-01-23 PROCEDURE — 99232 SBSQ HOSP IP/OBS MODERATE 35: CPT | Performed by: NURSE PRACTITIONER

## 2025-01-23 PROCEDURE — 6370000000 HC RX 637 (ALT 250 FOR IP)

## 2025-01-23 PROCEDURE — 82947 ASSAY GLUCOSE BLOOD QUANT: CPT

## 2025-01-23 PROCEDURE — 85652 RBC SED RATE AUTOMATED: CPT

## 2025-01-23 PROCEDURE — 6370000000 HC RX 637 (ALT 250 FOR IP): Performed by: HOSPITALIST

## 2025-01-23 PROCEDURE — 36415 COLL VENOUS BLD VENIPUNCTURE: CPT

## 2025-01-23 PROCEDURE — 2580000003 HC RX 258

## 2025-01-23 PROCEDURE — 1200000000 HC SEMI PRIVATE

## 2025-01-23 PROCEDURE — 2580000003 HC RX 258: Performed by: HOSPITALIST

## 2025-01-23 PROCEDURE — 76770 US EXAM ABDO BACK WALL COMP: CPT

## 2025-01-23 PROCEDURE — 6360000002 HC RX W HCPCS

## 2025-01-23 RX ORDER — DEXTROSE MONOHYDRATE 100 MG/ML
INJECTION, SOLUTION INTRAVENOUS CONTINUOUS PRN
Status: DISCONTINUED | OUTPATIENT
Start: 2025-01-23 | End: 2025-01-31 | Stop reason: HOSPADM

## 2025-01-23 RX ADMIN — DEXTROSE 250 ML: 10 SOLUTION INTRAVENOUS at 15:42

## 2025-01-23 RX ADMIN — SODIUM BICARBONATE 650 MG: 650 TABLET ORAL at 08:12

## 2025-01-23 RX ADMIN — INSULIN HUMAN 10 UNITS: 100 INJECTION, SOLUTION PARENTERAL at 16:18

## 2025-01-23 RX ADMIN — HYDROXYZINE HYDROCHLORIDE 50 MG: 25 TABLET ORAL at 21:13

## 2025-01-23 RX ADMIN — ONDANSETRON 4 MG: 2 INJECTION, SOLUTION INTRAMUSCULAR; INTRAVENOUS at 10:41

## 2025-01-23 RX ADMIN — SODIUM CHLORIDE, PRESERVATIVE FREE 10 ML: 5 INJECTION INTRAVENOUS at 08:12

## 2025-01-23 RX ADMIN — SODIUM BICARBONATE 650 MG: 650 TABLET ORAL at 21:13

## 2025-01-23 RX ADMIN — DILTIAZEM HYDROCHLORIDE 60 MG: 60 TABLET ORAL at 08:15

## 2025-01-23 RX ADMIN — OXYCODONE HYDROCHLORIDE 10 MG: 5 TABLET ORAL at 21:25

## 2025-01-23 RX ADMIN — HYDROXYZINE HYDROCHLORIDE 50 MG: 25 TABLET ORAL at 08:12

## 2025-01-23 RX ADMIN — SODIUM CHLORIDE, PRESERVATIVE FREE 10 ML: 5 INJECTION INTRAVENOUS at 21:14

## 2025-01-23 RX ADMIN — DULOXETINE 30 MG: 30 CAPSULE, DELAYED RELEASE ORAL at 08:16

## 2025-01-23 RX ADMIN — CEFEPIME 1000 MG: 1 INJECTION, POWDER, FOR SOLUTION INTRAMUSCULAR; INTRAVENOUS at 08:20

## 2025-01-23 RX ADMIN — Medication 1000 UNITS: at 21:13

## 2025-01-23 RX ADMIN — METOPROLOL 100 MG: 100 TABLET ORAL at 08:16

## 2025-01-23 RX ADMIN — METOPROLOL 100 MG: 100 TABLET ORAL at 21:13

## 2025-01-23 RX ADMIN — CEFEPIME 1000 MG: 1 INJECTION, POWDER, FOR SOLUTION INTRAMUSCULAR; INTRAVENOUS at 21:22

## 2025-01-23 RX ADMIN — DILTIAZEM HYDROCHLORIDE 60 MG: 60 TABLET ORAL at 21:13

## 2025-01-23 RX ADMIN — HEPARIN SODIUM 5000 UNITS: 5000 INJECTION INTRAVENOUS; SUBCUTANEOUS at 21:13

## 2025-01-23 RX ADMIN — TRAZODONE HYDROCHLORIDE 50 MG: 50 TABLET ORAL at 21:13

## 2025-01-23 RX ADMIN — SODIUM POLYSTYRENE SULFONATE 15 G: 15 SUSPENSION ORAL at 15:43

## 2025-01-23 RX ADMIN — OXYCODONE HYDROCHLORIDE 10 MG: 5 TABLET ORAL at 08:26

## 2025-01-23 ASSESSMENT — PAIN DESCRIPTION - PAIN TYPE
TYPE: ACUTE PAIN
TYPE: ACUTE PAIN

## 2025-01-23 ASSESSMENT — PAIN SCALES - GENERAL
PAINLEVEL_OUTOF10: 2
PAINLEVEL_OUTOF10: 4
PAINLEVEL_OUTOF10: 7
PAINLEVEL_OUTOF10: 8

## 2025-01-23 ASSESSMENT — PAIN DESCRIPTION - ORIENTATION
ORIENTATION: RIGHT
ORIENTATION: RIGHT

## 2025-01-23 ASSESSMENT — PAIN DESCRIPTION - FREQUENCY
FREQUENCY: CONTINUOUS
FREQUENCY: CONTINUOUS

## 2025-01-23 ASSESSMENT — PAIN DESCRIPTION - LOCATION
LOCATION: FOOT
LOCATION: LEG;FOOT

## 2025-01-23 ASSESSMENT — PAIN DESCRIPTION - ONSET
ONSET: ON-GOING
ONSET: ON-GOING

## 2025-01-23 ASSESSMENT — PAIN DESCRIPTION - DESCRIPTORS
DESCRIPTORS: ACHING;THROBBING
DESCRIPTORS: ACHING;THROBBING;DULL

## 2025-01-23 ASSESSMENT — PAIN - FUNCTIONAL ASSESSMENT
PAIN_FUNCTIONAL_ASSESSMENT: PREVENTS OR INTERFERES SOME ACTIVE ACTIVITIES AND ADLS
PAIN_FUNCTIONAL_ASSESSMENT: PREVENTS OR INTERFERES SOME ACTIVE ACTIVITIES AND ADLS

## 2025-01-23 NOTE — PLAN OF CARE
Problem: Chronic Conditions and Co-morbidities  Goal: Patient's chronic conditions and co-morbidity symptoms are monitored and maintained or improved  1/23/2025 1652 by Jillian Horton RN  Flowsheets (Taken 1/22/2025 0250 by Ashtyn Casarez RN)  Care Plan - Patient's Chronic Conditions and Co-Morbidity Symptoms are Monitored and Maintained or Improved:   Monitor and assess patient's chronic conditions and comorbid symptoms for stability, deterioration, or improvement   Collaborate with multidisciplinary team to address chronic and comorbid conditions and prevent exacerbation or deterioration   Update acute care plan with appropriate goals if chronic or comorbid symptoms are exacerbated and prevent overall improvement and discharge  1/23/2025 0322 by Lisseth Sosa RN  Outcome: Progressing     Problem: Discharge Planning  Goal: Discharge to home or other facility with appropriate resources  1/23/2025 1652 by Jillian Horton RN  Flowsheets (Taken 1/22/2025 0015 by Ashtyn Casarez, RN)  Discharge to home or other facility with appropriate resources:   Identify barriers to discharge with patient and caregiver   Arrange for interpreters to assist at discharge as needed   Arrange for needed discharge resources and transportation as appropriate   Refer to discharge planning if patient needs post-hospital services based on physician order or complex needs related to functional status, cognitive ability or social support system   Identify discharge learning needs (meds, wound care, etc)  1/23/2025 0322 by Lisseth Sosa RN  Outcome: Progressing     Problem: Pain  Goal: Verbalizes/displays adequate comfort level or baseline comfort level  1/23/2025 1652 by Jillian Horton RN  Flowsheets (Taken 1/22/2025 0250 by Ashtyn Casarez RN)  Verbalizes/displays adequate comfort level or baseline comfort level:   Encourage patient to monitor pain and request assistance   Administer analgesics based on

## 2025-01-23 NOTE — PROGRESS NOTES
Comprehensive Nutrition Assessment    Type and Reason for Visit:  Initial, Wound    Nutrition Recommendations/Plan:   Recommend resume diet after surgery, suggest CHO5  Patient may require high protein Ensure or Nepro, dependent on labs  Encourage good po intakes  RD to follow/monitor.      Malnutrition Assessment:  Malnutrition Status:  At risk for malnutrition (01/23/25 0851)        Nutrition Assessment:    Patient is a 65 year old gentleman who presents with toe osteomyelitis. History of CKD3, HTN, SBO, T2DM, single kidney. Patient to go to OR today for digital amputation and leg I&D. Patient was sound asleep when this patient attempted to see him, then OOR on second attempt. Patient is NPO for surgery. He had been tolerating a 4CHO, denied weight loss or poor appetite on admission. Labs, meds, PMH reviewed.    Nutrition Related Findings:    No BM noted, +3 RLE edema; K+ 5.9, glucose elevated Wound Type:  (osteomyelitis)       Current Nutrition Intake & Therapies:    Average Meal Intake: NPO  Average Supplements Intake: NPO  Diet NPO    Anthropometric Measures:  Height: 180.3 cm (5' 11\")  Ideal Body Weight (IBW): 172 lbs (78 kg)       Current Body Weight: 125.6 kg (277 lb),   IBW.    Current BMI (kg/m2): 38.7                                  Estimated Daily Nutrient Needs:  Energy Requirements Based On: Kcal/kg  Weight Used for Energy Requirements: Current  Energy (kcal/day): 8836-7681 (15-17)  Weight Used for Protein Requirements: Current  Protein (g/day): 125-150 (1-1.2)  Method Used for Fluid Requirements: 1 ml/kcal  Fluid (ml/day): 1366-9874 (15-17)    Nutrition Diagnosis:   Increased nutrient needs related to inadequate protein-energy intake as evidenced by  (osteomyelitis and upcoming surgery)    Nutrition Interventions:   Food and/or Nutrient Delivery: Start Oral Diet  Nutrition Education/Counseling: No recommendation at this time  Coordination of Nutrition Care: Continue to monitor while inpatient

## 2025-01-23 NOTE — PLAN OF CARE
Pt utilizes pain scale appropriately, able to report pain level and response to pain medication.  Pt independent remains free from injury.      Problem: Chronic Conditions and Co-morbidities  Goal: Patient's chronic conditions and co-morbidity symptoms are monitored and maintained or improved  1/23/2025 0322 by Lisseth Sosa RN  Outcome: Progressing     Problem: Discharge Planning  Goal: Discharge to home or other facility with appropriate resources  1/23/2025 0322 by Lisseth Sosa RN  Outcome: Progressing     Problem: Pain  Goal: Verbalizes/displays adequate comfort level or baseline comfort level  1/23/2025 0322 by Lisseth Sosa RN  Outcome: Progressing     Problem: Safety - Adult  Goal: Free from fall injury  1/23/2025 0322 by Lisseth Sosa, RN  Outcome: Progressing

## 2025-01-23 NOTE — CARE COORDINATION
Transitional Planning:  Call from Vanesa at [facility].  They have accepted.

## 2025-01-23 NOTE — PROGRESS NOTES
[facility]  Office: [phone]  Aung Luis DO, Mansoor Cline DO, Josse Han DO, Gregory Archibald DO, Andrew Roberts MD, Mayra Medel MD, Ray Johnson MD, Romy Amador MD,  Mic Meraz MD, Rom Hayes MD, Casandra Washington MD,  Ely Burks DO, Dorie Anderson MD, Marcos Travis MD, Trey Luis DO, Delia Clemens MD,  Jorge Major DO, Gisel Herbert MD, Rose Yi MD, Celia Guillen MD, Matthew Hyatt MD,  Sammy Galvez MD, Mary Kay Karimi MD, Edson Hay MD, Mateo Turner MD, Stan Cline MD, Rahda Hoskins MD, Mikael Cm DO, Eran Mckenzie MD, Ely Blackman MD, Mohsin Reza, MD, Shirley Waterhouse, CNP,  Missy Chowdhury CNP, Mikael Cai, CNP,  Shantelle Monson, DNP, Betty Boles, CNP, Yany Garcia, CNP, Cathie Law, CNP, Lynda Russell, CNP, An King, PA-C, Zoraida Sherman PA-C, Gabby Vaughn, CNP, Saray Patton, CNP,  Lacey Wilkinson, CNP, Nidhi Sinha, CNP, Jaclyn Neff, CNP,  Patsy Peterson, CNS, Zulay Ramsay, CNP, Martha Prince, CNP,   Mariela Benedict, CNP         [facility]   IN-PATIENT SERVICE   [facility]    Progress Note    1/23/2025    2:12 PM    Name:   Prateek Denton II  MRN:     [id number]     Acct:      [id number]   Room:   2011/2011-02  IP Day:  2  Admit Date:  1/21/2025  6:27 PM    PCP:   Lisseth Coello, TORREY - CNP  Code Status:  Full Code    Subjective:     Patient seen in follow-up for right foot osteomyelitis, patient states \"I am nauseated\"    Patient is suffering from some nausea and vomiting which is presently controlled with Zofran.  Wife presented to the bedside and long discussion occurred between wife, podiatry and myself.  We reviewed the patient's MRI.  The patient has a calcaneal osteomyelitis and will need a partial amputation.  There will be an attempt at repair of his Achilles tendon rupture.  He will also undergo amputation of his toe.  I did have a long discussion with the patient

## 2025-01-23 NOTE — PROGRESS NOTES
Progress Note  Foot and Ankle Surgery    CC/Reason for consult:   Osteomyelitis, 1st digit  Osteomyelitis, direct extension, calcaneus right  Achilles tendon rupture, right leg  Abscess, achilles tendon, right leg      Interval history:  Patient seen and examined  No complaints or concerns  No issue overnight  Pain controlled  Positive for N/v during exam- consent deferred due to patient unable  PT worked with and evalated patient   Vitals reviewed, afebrile  Long discussion with patient and wife about surgery today.    Updated Labs:     WBC:   Lab Results   Component Value Date    WBC 7.6 01/23/2025     ESR:  Lab Results   Component Value Date    SEDRATE 73 (H) 01/23/2025     CRP:  Lab Results   Component Value Date    CRP 75.0 (H) 01/23/2025       HPI:   See consultation note for HPI.   ROS: Denies N/V/F/C/SOB/CP.  Otherwise negative except at stated in the HPI in consultation note.   Vitals:  Vitals:    01/22/25 2351   BP: 130/79   Pulse: 67   Resp: 16   Temp: 97.7 °F (36.5 °C)   SpO2: 95%     I/O last 3 completed shifts:  In: 883.5 [I.V.:450.9; IV Piggyback:432.6]  Out: 700 [Urine:700]  Labs:  Recent Labs     01/22/25  0630 01/23/25  0558   WBC  --  7.6   HGB  --  10.8*   HCT  --  32.7*   PLT  --  254     --    K 5.8*  --    BUN 63*  --    CREATININE 3.9*  --    GLUCOSE 112  --    SEDRATE  --  73*   CRP 54.7* 75.0*      CBC:  Recent Labs     01/21/25  1844 01/21/25  1947 01/22/25  0516 01/22/25  0630 01/23/25  0558   WBC 11.4*  --  10.6  --  7.6   HGB 12.0*  --  10.7*  --  10.8*   HCT 35.7*  --  32.1*  --  32.7*     --  263  --  254   CRP  --  56.7*  --  54.7* 75.0*      BMP:  Recent Labs     01/21/25  1947 01/21/25  2144 01/21/25 2218 01/22/25  0630    136  --  137   K 6.8* 5.8* 4.9 5.8*   * 106  --  109*   CO2 15* 17*  --  17*   BUN 67* 66*  --  63*   CREATININE 4.0* 4.1*  --  3.9*   GLUCOSE 140* 200*  --  112   CALCIUM 9.0 9.1  --  9.0      Coags:  No results for input(s): \"APTT\",  malodor.  Local periwound erythema.  Wound did not probe to bone, sinus track or undermine.  No fluctuance, crepitus, induration appreciated.  There is noted to be exposed suture from previous Achilles tendon repair in 2010.      Imaging:   MRI ANKLE RIGHT WO CONTRAST   Final Result   1. Extensive osteomyelitis of the posterior calcaneus.   2. Full-thickness tear of the Achilles tendon         MRI FOOT RIGHT WO CONTRAST   Final Result   Osteomyelitis of the distal aspect of the distal phalanx of the great toe.         XR FOOT RIGHT (MIN 3 VIEWS)   Final Result   1.  Acute osteomyelitis of the distal 1st phalanx .      2.  Age-indeterminate deformity of the distal 2nd phalanx.         US RETROPERITONEAL COMPLETE    (Results Pending)     Cultures:   See culture results    Assessment    65 y.o. male being seen for:       Osteomyelitis, 1st digit right  Osteomyelitis, direct extension, calcaneus right  Achilles tendon rupture, right leg  Abscess, achilles tendon, right leg  Diabetes mellitus type 2  Cellulitis, right lower extremity  S/p Achilles tendon surgery (2010)    Principal Problem:    Toe osteomyelitis, right  Active Problems:    Essential hypertension    CKD (chronic kidney disease) stage 4, GFR 15-29 ml/min ([facility])    Sleep apnea    Type 2 diabetes mellitus with diabetic nephropathy, with long-term current use of insulin ([facility])    Hyperkalemia    Elevated serum creatinine    Paroxysmal A-fib ([facility])    Secondary hypercoagulable state ([facility])  Resolved Problems:    * No resolved hospital problems. *       Plan   -- Plan for OR on 1-23 with Dr. Del Valle for digital amputation, I&D right leg, pending risk stratification from primary team   -Patient admitted for: osteomyelitis right foot and ankle  -NIVS not ordered. Adequate flow confirmed with bedside doppler  -ID consulted: IV Cefepime, Dapto. Trend WBC, ESR, CRP daily. Follow cultures  -Consults: IM for medical managment  -Diet: carb control  -DVT ppx: Please hold

## 2025-01-23 NOTE — PLAN OF CARE
Foot and Ankle Surgery Plan of Care      Plan for OR tomorrow at 4pm for digital amputation and leg I&D with possible achilles debridement and partial calcanectomy  Will round tomorrow to discuss with patient   NPO and Hold AC at MN tonight.      ___________________  Albert Gray DPM PGY3  Foot and Ankle Surgery  [facility]  1/22/2025 at 7:52 PM

## 2025-01-23 NOTE — CARE COORDINATION
Transitional Planning:  Spoke with patient and wife re: possibility of needing IV ABX at discharge.  They are teachable.  Referral to [facility] per preference & Option.

## 2025-01-23 NOTE — PROGRESS NOTES
[facility]  Progress Note    Prateek Denton II  MRN: [id number]  Date: 1/23/2025  LOS: 2     Reason for F/U :   Right great toe and posterior heel diabetic foot ulcerations    Impression :   Right great toe distal aspect diabetic foot ulceration with associated osteomyelitis on MRI  Right posterior heel diabetic ulceration with calcaneal osteomyelitis on MRI  Diabetes mellitus type 2 with peripheral neuropathy  Atrial fibrillation  Acute on chronic kidney disease stage IV  Single right kidney since 2020  Hypertension  Hyperlipidemia    Recommendations:   Patient continues on IV antimicrobial therapy with cefepime and daptomycin  MRI does show osteomyelitis of the right great toe as well as extensive osteomyelitis of the posterior calcaneus  Plan for the patient to go to the operating room this afternoon with podiatry  Wound culture is pending, Gram stain thus far with gram-positive rods, gram-negative rods and gram-positive cocci  Blood cultures are pending  We will await operative findings, follow clinical progress and adjust therapy accordingly    Infection Control Recommendations:   Universal precautions    Discharge Planning:   Estimated Length of IV antimicrobials: To be determined  Patient will need Midline Catheter Insertion/ PICC line Insertion: No  Patient will need: Home IV , Infusion Center,  SNF,  LTAC: Undetermined  Patient willneed outpatient wound care: No    Medical Decision making / Summary of Stay:   Prateek Denton II is a 65 y.o.-year-old male who was initially admitted on 1/21/2025.  Patient has a known history of chronic kidney disease stage IV, left-sided nephrectomy in 2020, diabetes mellitus with peripheral neuropathy, atrial fibrillation, hypertension, hyperlipidemia.  Patient does report seeing podiatry quite some time ago for a left foot plantar aspect wound, which she used Medihoney and that went away.     Patient presented to the emergency department 1/21/2025 with

## 2025-01-23 NOTE — PROGRESS NOTES
Spiritual Health History and Assessment/Progress Note  [facility]    (P) Spiritual/Emotional Needs,  ,  ,      Name: Prateek Denton II MRN: [id number]    Age: 65 y.o.     Sex: male   Language: English   Alevism: Yarsanism   Toe osteomyelitis, right     Date: 1/23/2025            Total Time Calculated: (P) 8 min              Spiritual Assessment began in [facility]        Referral/Consult From: (P) Rounding   Encounter Overview/Reason: (P) Spiritual/Emotional Needs  Service Provided For: (P) Patient    Patient engaged with  explaining he would shortly be going in for some foot surgery, cleaning out some wounds. Patient welcomed prayer before surgery.  provided supportive presence, irais affirmation, and prayer. Patient expressed appreciation for spiritual care and encouragement.    Irais, Belief, Meaning:   Patient has beliefs or practices that help with coping during difficult times  Family/Friends No family/friends present      Importance and Influence:  Patient has spiritual/personal beliefs that influence decisions regarding their health  Family/Friends No family/friends present    Community:  Patient feels well-supported. Support system includes: Spouse/Partner  Family/Friends No family/friends present    Assessment and Plan of Care:     Patient Interventions include: Facilitated expression of thoughts and feelings, Explored spiritual coping/struggle/distress, and Affirmed coping skills/support systems  Family/Friends Interventions include: No family/friends present    Patient Plan of Care: Spiritual Care available upon further referral  Family/Friends Plan of Care: Spiritual Care available upon further referral    Electronically signed by Chaplain Dontae on 1/23/2025 at 4:28 PM

## 2025-01-23 NOTE — PROGRESS NOTES
Surgery was cancelled due to hyperkalemia. Patient notified and Dr. Del Valle at bedside to discuss the situation with the patient. Patient and wife understanding of situation and all questions were addressed. Patient received diet order and dinner tray was delivered.

## 2025-01-24 LAB
ALBUMIN SERPL-MCNC: 3.3 G/DL (ref 3.5–5.2)
ALBUMIN/GLOB SERPL: 0.9 {RATIO} (ref 1–2.5)
ALBUMIN: 3.4 G/DL (ref 3.5–5.2)
ALP SERPL-CCNC: 86 U/L (ref 40–129)
ALT SERPL-CCNC: 19 U/L (ref 10–50)
ANION GAP SERPL CALCULATED.3IONS-SCNC: 13 MMOL/L (ref 9–16)
ANION GAP SERPL CALCULATED.3IONS-SCNC: 14 MMOL/L (ref 9–16)
AST SERPL-CCNC: 19 U/L (ref 10–50)
BASOPHILS # BLD: 0.04 K/UL (ref 0–0.2)
BASOPHILS NFR BLD: 0 % (ref 0–2)
BILIRUB SERPL-MCNC: 0.2 MG/DL (ref 0–1.2)
BUN SERPL-MCNC: 52 MG/DL (ref 8–23)
BUN SERPL-MCNC: 55 MG/DL (ref 8–23)
CALCIUM SERPL-MCNC: 9 MG/DL (ref 8.8–10.2)
CALCIUM SERPL-MCNC: 9.3 MG/DL (ref 8.8–10.2)
CHLORIDE SERPL-SCNC: 102 MMOL/L (ref 98–107)
CHLORIDE SERPL-SCNC: 105 MMOL/L (ref 98–107)
CO2 SERPL-SCNC: 18 MMOL/L (ref 20–31)
CO2 SERPL-SCNC: 20 MMOL/L (ref 20–31)
CREAT SERPL-MCNC: 3.6 MG/DL (ref 0.7–1.2)
CREAT SERPL-MCNC: 3.7 MG/DL (ref 0.7–1.2)
CRP SERPL HS-MCNC: 63.5 MG/L (ref 0–5)
EKG ATRIAL RATE: 80 BPM
EKG P AXIS: 44 DEGREES
EKG P-R INTERVAL: 170 MS
EKG Q-T INTERVAL: 362 MS
EKG QRS DURATION: 74 MS
EKG QTC CALCULATION (BAZETT): 417 MS
EKG R AXIS: 7 DEGREES
EKG T AXIS: 32 DEGREES
EKG VENTRICULAR RATE: 80 BPM
EOSINOPHIL # BLD: 0.3 K/UL (ref 0–0.44)
EOSINOPHILS RELATIVE PERCENT: 3 % (ref 1–4)
ERYTHROCYTE [DISTWIDTH] IN BLOOD BY AUTOMATED COUNT: 12.3 % (ref 11.8–14.4)
ERYTHROCYTE [SEDIMENTATION RATE] IN BLOOD BY PHOTOMETRIC METHOD: 92 MM/HR (ref 0–20)
GFR, ESTIMATED: 17 ML/MIN/1.73M2
GFR, ESTIMATED: 18 ML/MIN/1.73M2
GLUCOSE BLD-MCNC: 110 MG/DL (ref 75–110)
GLUCOSE BLD-MCNC: 124 MG/DL (ref 75–110)
GLUCOSE BLD-MCNC: 132 MG/DL (ref 75–110)
GLUCOSE BLD-MCNC: 138 MG/DL (ref 75–110)
GLUCOSE SERPL-MCNC: 112 MG/DL (ref 82–115)
GLUCOSE SERPL-MCNC: 138 MG/DL (ref 82–115)
HCT VFR BLD AUTO: 31.8 % (ref 40.7–50.3)
HGB BLD-MCNC: 10.6 G/DL (ref 13–17)
IMM GRANULOCYTES # BLD AUTO: 0.04 K/UL (ref 0–0.3)
IMM GRANULOCYTES NFR BLD: 0 %
LYMPHOCYTES NFR BLD: 1.12 K/UL (ref 1.1–3.7)
LYMPHOCYTES RELATIVE PERCENT: 13 % (ref 24–43)
MCH RBC QN AUTO: 29.1 PG (ref 25.2–33.5)
MCHC RBC AUTO-ENTMCNC: 33.3 G/DL (ref 28.4–34.8)
MCV RBC AUTO: 87.4 FL (ref 82.6–102.9)
MONOCYTES NFR BLD: 0.88 K/UL (ref 0.1–1.2)
MONOCYTES NFR BLD: 10 % (ref 3–12)
NEUTROPHILS NFR BLD: 74 % (ref 36–65)
NEUTS SEG NFR BLD: 6.51 K/UL (ref 1.5–8.1)
NRBC BLD-RTO: 0 PER 100 WBC
PHOSPHATE SERPL-MCNC: 3.9 MG/DL (ref 2.5–4.5)
PLATELET # BLD AUTO: 266 K/UL (ref 138–453)
PMV BLD AUTO: 10 FL (ref 8.1–13.5)
POTASSIUM SERPL-SCNC: 5.2 MMOL/L (ref 3.7–5.3)
POTASSIUM SERPL-SCNC: 5.5 MMOL/L (ref 3.7–5.3)
PROT SERPL-MCNC: 7 G/DL (ref 6.6–8.7)
RBC # BLD AUTO: 3.64 M/UL (ref 4.21–5.77)
SODIUM SERPL-SCNC: 135 MMOL/L (ref 136–145)
SODIUM SERPL-SCNC: 137 MMOL/L (ref 136–145)
WBC OTHER # BLD: 8.9 K/UL (ref 3.5–11.3)

## 2025-01-24 PROCEDURE — 36415 COLL VENOUS BLD VENIPUNCTURE: CPT

## 2025-01-24 PROCEDURE — 2500000003 HC RX 250 WO HCPCS

## 2025-01-24 PROCEDURE — 80069 RENAL FUNCTION PANEL: CPT

## 2025-01-24 PROCEDURE — 1200000000 HC SEMI PRIVATE

## 2025-01-24 PROCEDURE — 6370000000 HC RX 637 (ALT 250 FOR IP)

## 2025-01-24 PROCEDURE — 99232 SBSQ HOSP IP/OBS MODERATE 35: CPT | Performed by: NURSE PRACTITIONER

## 2025-01-24 PROCEDURE — 82947 ASSAY GLUCOSE BLOOD QUANT: CPT

## 2025-01-24 PROCEDURE — 97110 THERAPEUTIC EXERCISES: CPT

## 2025-01-24 PROCEDURE — 2580000003 HC RX 258

## 2025-01-24 PROCEDURE — 6370000000 HC RX 637 (ALT 250 FOR IP): Performed by: NURSE PRACTITIONER

## 2025-01-24 PROCEDURE — 86140 C-REACTIVE PROTEIN: CPT

## 2025-01-24 PROCEDURE — 6370000000 HC RX 637 (ALT 250 FOR IP): Performed by: EMERGENCY MEDICINE

## 2025-01-24 PROCEDURE — 80053 COMPREHEN METABOLIC PANEL: CPT

## 2025-01-24 PROCEDURE — 6360000002 HC RX W HCPCS

## 2025-01-24 PROCEDURE — 2580000003 HC RX 258: Performed by: NURSE PRACTITIONER

## 2025-01-24 PROCEDURE — 6360000002 HC RX W HCPCS: Performed by: NURSE PRACTITIONER

## 2025-01-24 PROCEDURE — 85652 RBC SED RATE AUTOMATED: CPT

## 2025-01-24 PROCEDURE — 85025 COMPLETE CBC W/AUTO DIFF WBC: CPT

## 2025-01-24 PROCEDURE — 99232 SBSQ HOSP IP/OBS MODERATE 35: CPT | Performed by: INTERNAL MEDICINE

## 2025-01-24 RX ADMIN — Medication 1000 UNITS: at 21:12

## 2025-01-24 RX ADMIN — POLYETHYLENE GLYCOL 3350 17 G: 17 POWDER, FOR SOLUTION ORAL at 15:31

## 2025-01-24 RX ADMIN — DILTIAZEM HYDROCHLORIDE 60 MG: 60 TABLET ORAL at 21:12

## 2025-01-24 RX ADMIN — HEPARIN SODIUM 5000 UNITS: 5000 INJECTION INTRAVENOUS; SUBCUTANEOUS at 13:33

## 2025-01-24 RX ADMIN — SODIUM BICARBONATE 650 MG: 650 TABLET ORAL at 13:33

## 2025-01-24 RX ADMIN — CEFEPIME 1000 MG: 1 INJECTION, POWDER, FOR SOLUTION INTRAMUSCULAR; INTRAVENOUS at 21:11

## 2025-01-24 RX ADMIN — DULOXETINE 30 MG: 30 CAPSULE, DELAYED RELEASE ORAL at 09:53

## 2025-01-24 RX ADMIN — SODIUM CHLORIDE, PRESERVATIVE FREE 10 ML: 5 INJECTION INTRAVENOUS at 21:05

## 2025-01-24 RX ADMIN — HEPARIN SODIUM 5000 UNITS: 5000 INJECTION INTRAVENOUS; SUBCUTANEOUS at 06:26

## 2025-01-24 RX ADMIN — DAPTOMYCIN 600 MG: 500 INJECTION, POWDER, LYOPHILIZED, FOR SOLUTION INTRAVENOUS at 22:44

## 2025-01-24 RX ADMIN — HEPARIN SODIUM 5000 UNITS: 5000 INJECTION INTRAVENOUS; SUBCUTANEOUS at 21:12

## 2025-01-24 RX ADMIN — HYDROXYZINE HYDROCHLORIDE 50 MG: 25 TABLET ORAL at 09:53

## 2025-01-24 RX ADMIN — CEFEPIME 1000 MG: 1 INJECTION, POWDER, FOR SOLUTION INTRAMUSCULAR; INTRAVENOUS at 09:54

## 2025-01-24 RX ADMIN — METOPROLOL 100 MG: 100 TABLET ORAL at 09:52

## 2025-01-24 RX ADMIN — OXYCODONE HYDROCHLORIDE 10 MG: 5 TABLET ORAL at 21:12

## 2025-01-24 RX ADMIN — TRAZODONE HYDROCHLORIDE 50 MG: 50 TABLET ORAL at 21:12

## 2025-01-24 RX ADMIN — SODIUM BICARBONATE 650 MG: 650 TABLET ORAL at 21:12

## 2025-01-24 RX ADMIN — OXYCODONE HYDROCHLORIDE 10 MG: 5 TABLET ORAL at 13:32

## 2025-01-24 RX ADMIN — SODIUM ZIRCONIUM CYCLOSILICATE 10 G: 10 POWDER, FOR SUSPENSION ORAL at 22:34

## 2025-01-24 RX ADMIN — HYDROXYZINE HYDROCHLORIDE 50 MG: 25 TABLET ORAL at 21:12

## 2025-01-24 RX ADMIN — METOPROLOL 100 MG: 100 TABLET ORAL at 21:12

## 2025-01-24 RX ADMIN — DILTIAZEM HYDROCHLORIDE 60 MG: 60 TABLET ORAL at 09:53

## 2025-01-24 RX ADMIN — SODIUM BICARBONATE 650 MG: 650 TABLET ORAL at 10:22

## 2025-01-24 RX ADMIN — SODIUM CHLORIDE, PRESERVATIVE FREE 10 ML: 5 INJECTION INTRAVENOUS at 12:15

## 2025-01-24 ASSESSMENT — PAIN DESCRIPTION - LOCATION
LOCATION: ANKLE;FOOT
LOCATION: FOOT

## 2025-01-24 ASSESSMENT — PAIN SCALES - GENERAL
PAINLEVEL_OUTOF10: 8
PAINLEVEL_OUTOF10: 4
PAINLEVEL_OUTOF10: 4
PAINLEVEL_OUTOF10: 8

## 2025-01-24 ASSESSMENT — PAIN - FUNCTIONAL ASSESSMENT: PAIN_FUNCTIONAL_ASSESSMENT: ACTIVITIES ARE NOT PREVENTED

## 2025-01-24 ASSESSMENT — PAIN DESCRIPTION - DESCRIPTORS
DESCRIPTORS: ACHING;SORE
DESCRIPTORS: ACHING;DISCOMFORT

## 2025-01-24 ASSESSMENT — PAIN DESCRIPTION - ORIENTATION
ORIENTATION: RIGHT
ORIENTATION: RIGHT

## 2025-01-24 NOTE — CARE COORDINATION
Transitional Planning:  Spoke with Susu at [facility].  Updated on patient's condition. Informed her patient's surgery was cancelled yesterday due to his potassium level.Informed her we are waiting for patient's potassium to come down before he can go for surgery. We do not anticipate patient going home until post surgery.

## 2025-01-24 NOTE — PLAN OF CARE
Problem: Pain  Goal: Verbalizes/displays adequate comfort level or baseline comfort level  1/24/2025 0334 by Genie Craven, RN  Outcome: Progressing  Flowsheets (Taken 1/24/2025 0334)  Verbalizes/displays adequate comfort level or baseline comfort level:   Encourage patient to monitor pain and request assistance   Administer analgesics based on type and severity of pain and evaluate response   Assess pain using appropriate pain scale   Implement non-pharmacological measures as appropriate and evaluate response     Problem: Safety - Adult  Goal: Free from fall injury  1/24/2025 0334 by Genie Craven, RN  Outcome: Progressing  Flowsheets (Taken 1/24/2025 0334)  Free From Fall Injury: Instruct family/caregiver on patient safety

## 2025-01-24 NOTE — PROGRESS NOTES
Physician Progress Note      PATIENT:               LAW REBOLLEDO  [facility] #:                  [id number]  :                       1960  ADMIT DATE:       2025 6:27 PM  DISCH DATE:  RESPONDING  PROVIDER #:        Cesar Duffy MD          QUERY TEXT:    Patient admitted with right toe osteomyelitis.  Noted documentation of Acute   Kidney Injury in Nephrology consults on . Cr- 4.0, 4.1, 3.9, 3.7.  In   order to support the diagnosis of DEBBI, please include additional clinical   indicators in your documentation.? Or please document if the diagnosis of DEBBI   has been ruled out after further study.  The medical record reflects the following:  Risk Factors:  CKD stage 4, age- 65, status post left nephrectomy  Clinical Indicators: Nephrology consults - Acute kidney injury, Acute on   chronic kidney disease, baseline creatinine around 3 -3.5, GFR 20-24.  History   of left nephrectomy, most likely prerenal component, rule out obstruction  Nephrology PN - Acute on chronic kidney disease, baseline creatinine   around 3 -3.5, GFR 20-24.   Most likely prerenal component and possible early   ATN, renal ultrasound negative for obstruction  -:  Cr-- 4.0, 4.1, 3.9, 3.7  BUN- 67, 66, 63, 57  GFR- 16, 15, 16, 17  Treatment:  IV Fluids, nephrology consult, serial labs, renal ultrasound  Thank you,  PENG Palacio CDS    Defined by Kidney Disease Improving Global Outcomes (KDIGO) clinical practice   guideline for acute kidney injury:  -Increase in SCr by greater than or equal to 0.3 mg/dl within 48 hours; or  -Increase or decrease in SCr to greater than or equal to 1.5 times baseline,   which is known or presumed to have occurred within the prior 7 days; or  -Urine volume < 0.5ml/kg/h for 6 hours.  Options provided:  -- Acute kidney injury evidenced by, Please document evidence as well as a   numerical baseline creatinine, if known.  -- Acute kidney injury ruled out after study  -- Other - I

## 2025-01-24 NOTE — PROGRESS NOTES
[facility]    Shaukat Rashid MD Zafar Magsi MD Danial Rashid MD John O. Ranker MD Sembria Ligibel, BRIT       Nephrology Consultation Note    Reason for Consult:    Date of Service: 01/23/25   PCP: Lisseth Coello, APRN - CNP       Reason for Consult     Acute kidney injury  Hyperkalemia    Assessment and Plan     65 years old male patient with a past medical history significant for hypertension hyperlipidemia diabetes diabetic nephropathy status post left nephrectomy, presented with a chief complaints of right toe osteomyelitis.,  Apparently 2 weeks ago he noticed an ulcer on the right heel and bottom of the right great toe, despite local wound care, wound became worse, x-ray of the right foot shows osteomyelitis, patient was admitted to hospital medicine, podiatry consulted.  Nephrology was consulted for acute kidney injury and hyperkalemia.    Assessment  Acute on chronic kidney disease, baseline creatinine around 3 -3.5, GFR 20-24.   Most likely prerenal component and possible early ATN, renal ultrasound negative for obstruction  History of left nephrectomy  Hyperkalemia  Metabolic acidosis  Volume: Appears euvolemic  Hypertension: Stable  Hemodynamics: echo showing normal ejection fraction  Anemia of CKD  CKD MBD  Osteomyelitis of distal first phalanx  Diabetes mellitus type 2, last hemoglobin A1c 6.2 from October 2024  Atrial fibrillation  Hyperlipidemia  Obstructive sleep apnea  Anxiety depression.    Plan  Discontinue IV fluids, encourage oral intake  Hyperkalemia most likely due to advanced CKD and patient was given Lokelma but he is constipated, also patient was NPO overnight and that is why relative insulin deficiency is also contributing to refractory hyperkalemia, discussed with primary team , will give a trial of Kayexalate and  insulin with dextrose  Continue sodium bicarbonate by mouth 650 mg 3 times a day  Avoid nephrotoxins, hemodynamic instability  clubbing.  Integumentary: Pink, warm and dry. Free from rash or lesions.  CNS: Oriented to person, place and time. Speech clear. Face symmetrical. No tremor.       Medications     Scheduled Meds:   cefepime  1,000 mg IntraVENous Q12H    sodium bicarbonate  650 mg Oral TID    DAPTOmycin (CUBICIN) 600 mg in sodium chloride 0.9 % 50 mL IVPB  6 mg/kg (Adjusted) IntraVENous Q48H    sodium chloride flush  5-40 mL IntraVENous 2 times per day    dilTIAZem  60 mg Oral BID    DULoxetine  30 mg Oral Daily    hydrOXYzine HCl  50 mg Oral BID    metoprolol  100 mg Oral BID    [Held by provider] pravastatin  20 mg Oral Daily    traZODone  50 mg Oral Nightly    Vitamin D  1,000 Units Oral Daily    heparin (porcine)  5,000 Units SubCUTAneous 3 times per day    insulin lispro  0-8 Units SubCUTAneous 4x Daily AC & HS     Continuous Infusions:   dextrose      sodium chloride       PRN Meds:glucose, dextrose bolus **OR** dextrose bolus, glucagon (rDNA), dextrose, sodium chloride flush, sodium chloride, ondansetron **OR** ondansetron, polyethylene glycol, bisacodyl, acetaminophen **OR** acetaminophen, oxyCODONE **OR** oxyCODONE      .  Results Review      Labs Review:     Recent Labs     01/21/25  2144 01/21/25  2218 01/22/25  0630 01/23/25  0920     --  137 136   K 5.8* 4.9 5.8* 5.9*     --  109* 104   CO2 17*  --  17* 20   BUN 66*  --  63* 57*   CREATININE 4.1*  --  3.9* 3.7*   GLUCOSE 200*  --  112 146*   CALCIUM 9.1  --  9.0 9.3         Recent Labs     01/21/25  1844 01/22/25  0516 01/23/25  0558   WBC 11.4* 10.6 7.6   RBC 4.04* 3.63* 3.71*   HGB 12.0* 10.7* 10.8*   HCT 35.7* 32.1* 32.7*   MCV 88.4 88.4 88.1   MCH 29.7 29.5 29.1   MCHC 33.6 33.3 33.0   RDW 12.8 12.5 12.6    263 254   MPV 12.2 10.3 10.4      BNP:  Lab Results   Component Value Date/Time    BNP 90 11/21/2012 05:43 AM    BNP 25 11/20/2012 06:20 AM    BNP 14 11/19/2012 05:48 AM         Phosphorus:  No results for input(s): \"PHOS\" in the last 72

## 2025-01-24 NOTE — PROGRESS NOTES
[facility]  Progress Note    Prateek Denton II  MRN: [id number]  Date: 1/24/2025  LOS: 3     Reason for F/U :   Right great toe and posterior heel diabetic foot ulcerations    Impression :   Right great toe distal aspect diabetic foot ulceration with associated osteomyelitis on MRI  Right posterior heel diabetic ulceration with calcaneal osteomyelitis on MRI  Hyperkalemia  Diabetes mellitus type 2 with peripheral neuropathy  Atrial fibrillation  Acute on chronic kidney disease stage IV  Single right kidney since 2020  Hypertension  Hyperlipidemia    Recommendations:   Patient continues on IV antimicrobial therapy with cefepime and daptomycin  MRI does show osteomyelitis of the right great toe as well as extensive osteomyelitis of the posterior calcaneus  Plan was for the patient to go to the operating room yesterday with podiatry; however, this was canceled due to hyperkalemia  Wound culture is pending thus far with Staphylococcus aureus, Gram stain with gram-positive rods, gram-negative rods  We will await operative findings, follow clinical progress and adjust therapy accordingly    Infection Control Recommendations:   Universal precautions    Discharge Planning:   Estimated Length of IV antimicrobials: To be determined  Patient will need Midline Catheter Insertion/ PICC line Insertion: No  Patient will need: Home IV , Infusion Center,  SNF,  LTAC: Undetermined  Patient willneed outpatient wound care: No    Medical Decision making / Summary of Stay:   Prateek Denton II is a 65 y.o.-year-old male who was initially admitted on 1/21/2025.  Patient has a known history of chronic kidney disease stage IV, left-sided nephrectomy in 2020, diabetes mellitus with peripheral neuropathy, atrial fibrillation, hypertension, hyperlipidemia.  Patient does report seeing podiatry quite some time ago for a left foot plantar aspect wound, which she used Medihoney and that went away.     Patient presented to the

## 2025-01-24 NOTE — PROGRESS NOTES
[facility]  Office: [phone]  Aung Luis DO, Mansoor Cline DO, Josse Han DO, Gregory Archibald DO, Andrew Roberts MD, Mayra Medel MD, Ray Johnson MD, Romy Amador MD,  Mic Meraz MD, Rom Hayes MD, Casandra Washington MD,  Ely Burks DO, Dorie Anderson MD, Marcos Travis MD, Trey Luis DO, Delia Clemens MD,  Jorge Major DO, Gisel Herbert MD, Rose Yi MD, Celia Guillen MD, Matthew Hyatt MD,  Sammy Galvez MD, Mary Kay Karimi MD, Edson Hay MD, Mateo Turner MD, Stan Cline MD, Radha Hoskins MD, Mikael Cm DO, Eran Mckenzie MD, Ely Blackman MD, Mohsin Reza, MD, Shirley Waterhouse, CNP,  Missy Chowdhury CNP, Mikael Cai, CNP,  Shantelle Monson, DNP, Betty Boles, CNP, Yany Garcia, CNP, Cathie Law, CNP, Lynda Russell, CNP, An King, PA-C, Zoraida Sherman PA-C, Gabby Vaughn, CNP, Saray Patton, CNP,  Lacey Wilkinson, CNP, Nidhi Sinha, CNP, Jaclyn Neff, CNP,  Patsy Peterson, CNS, Zulay Ramsay CNP, Martha Prince, CNP,   Mariela Benedict, CNP         [facility]   IN-PATIENT SERVICE   [facility]    Progress Note    1/24/2025    11:48 AM    Name:   Prateek Denton II  MRN:     [id number]     Acct:      [id number]   Room:   2011/2011-02  IP Day:  3  Admit Date:  1/21/2025  6:27 PM    PCP:   Lisseth Coello, TORREY - CNP  Code Status:  Full Code    Subjective:     C/C:   Chief Complaint   Patient presents with    Leg Swelling    Knee Pain    Leg Pain    Cough     Interval History Status: improved.     Feels better  Less heel pain though still has some    Denies cp/sob/n/v    Brief History:     Per my partner:  \"This is a 65-year-old male with longstanding diabetes that presents with a 2-week history of right heel ulcer as well as great toe ulcer. Wounds have been worsening with increasing pain and subsequently presented emergency room last evening. He has been for diabetic foot infection with ulceration.

## 2025-01-24 NOTE — PROGRESS NOTES
\"PROT\"  Lab Results   Component Value Date    SEDRATE 73 (H) 01/23/2025     Recent Labs     01/22/25  0630 01/23/25  0558 01/24/25  0616   CRP 54.7* 75.0* 63.5*       PE:   Gen: Alert and oriented, NAD, cooperative.   Head: Normocephalic, atraumatic.  Cardiovascular: Normal rate.  Respiratory: Chest symmetric, no accessory muscle use.    LLE:  Vascular:  DP and PT pulses are  palpable. DP, PT, peroneal pulses are faint audible on Doppler. CFT <3 seconds to all digits.  Hair growth is absent to the level of the digits.   edema.    Neuro: Sural/saphenous/SPN/DPN/plantar nerve distribution SILT  Musculoskeletal: EHL/FHL/TA/GS complex motor intact. Non tender to palpation. Compartments soft and easily compressible  Dermatologic: No ecchymoses, abrasions, deformity, or lacerations. Skin intact.     RLE:  Vascular: DP and PT pulses are easily palpable. CFT < 3 seconds  brisk to all digits.  Hair growth is absent to the level of the digits.  Non-pitting edema.       Neuro: Saph/sural/SP/DP/plantar sensation diminished to light touch.      Musculoskeletal: Muscle strength is adequate ROM, adequate strength to all lower extremity muscle groups. Deformity is absent . Compartments are soft and compressible. No pain with compression of posterior calf.      Dermatologic:   Full  thickness ulcer #1 noted to the distal tip of the right hallux and measures approximately 1.5 cm x 1.0 cm x 0.3 cm. Base is mixed fibrotic and necrotic. Periwound skin is hyperkeratotic.  Minimal drainage noted with associated malodor. Local periwound erythema.  Does not probe to bone.  Does not sinus track, or undermine.  No fluctuance, crepitus, or induration.      Full-thickness ulcer #2 noted to the posterior aspect of the right heel.  Wound measures approximately 2.0 cm x 1.5 cm x 0.6 cm.  Base is mixed fibrotic and granular.  Epiboly noted to wound edges.  No drainage appreciated on exam, mild malodor.  Local periwound erythema.  Wound did not  probe to bone, sinus track or undermine.  No fluctuance, crepitus, induration appreciated.  There is noted to be exposed suture from previous Achilles tendon repair in 2010.              Imaging:   US RETROPERITONEAL COMPLETE   Final Result   1. No right-sided hydronephrosis.  Prior nephrectomy.   2. Lower pole right renal simple cyst measuring 1.6 cm.         MRI ANKLE RIGHT WO CONTRAST   Final Result   1. Extensive osteomyelitis of the posterior calcaneus.   2. Full-thickness tear of the Achilles tendon         MRI FOOT RIGHT WO CONTRAST   Final Result   Osteomyelitis of the distal aspect of the distal phalanx of the great toe.         XR FOOT RIGHT (MIN 3 VIEWS)   Final Result   1.  Acute osteomyelitis of the distal 1st phalanx .      2.  Age-indeterminate deformity of the distal 2nd phalanx.           Cultures:   See culture results    Assessment    65 y.o. male being seen for:       Osteomyelitis, 1st digit right  Osteomyelitis, direct extension, calcaneus right  Achilles tendon rupture, right leg  Abscess, achilles tendon, right leg  Diabetes mellitus type 2  Cellulitis, right lower extremity  S/p Achilles tendon surgery (2010)    Principal Problem:    Toe osteomyelitis, right  Active Problems:    Essential hypertension    CKD (chronic kidney disease) stage 4, GFR 15-29 ml/min ([facility])    Sleep apnea    Type 2 diabetes mellitus with diabetic nephropathy, with long-term current use of insulin ([facility])    Hyperkalemia    Elevated serum creatinine    Paroxysmal A-fib ([facility])    Secondary hypercoagulable state ([facility])    Diabetic ulcer of right heel associated with diabetes mellitus due to underlying condition, limited to breakdown of skin ([facility])  Resolved Problems:    * No resolved hospital problems. *       Plan   -- Plan for OR delayed due to elevated potassium.  We will await stabilization before proceeding.  -Patient admitted for: osteomyelitis right foot and ankle  -NIVS not ordered. Adequate flow confirmed with

## 2025-01-24 NOTE — PROGRESS NOTES
[facility]    Shaukat Rashid MD Zafar Magsi MD Danial Rashid MD John O. Ranker MD Sembria Ligibel, BRIT       Nephrology Consultation Note    Reason for Consult:    Date of Service: 01/24/25   PCP: Lisseth Coello, APRN - CNP       Reason for Consult     Acute kidney injury  Hyperkalemia    Assessment and Plan     65 years old male patient with a past medical history significant for hypertension hyperlipidemia diabetes diabetic nephropathy status post left nephrectomy, presented with a chief complaints of right toe osteomyelitis.,  Apparently 2 weeks ago he noticed an ulcer on the right heel and bottom of the right great toe, despite local wound care, wound became worse, x-ray of the right foot shows osteomyelitis, patient was admitted to hospital medicine, podiatry consulted.  Nephrology was consulted for acute kidney injury and hyperkalemia.    Assessment  Acute on chronic kidney disease, baseline creatinine around 3 -3.5, GFR 20-24.   Most likely prerenal component and possible early ATN, renal ultrasound negative for obstruction  History of left nephrectomy  Hyperkalemia  Metabolic acidosis  Volume: Appears euvolemic  Hypertension: Stable  Hemodynamics: echo showing normal ejection fraction  Anemia of CKD  CKD MBD  Osteomyelitis of distal first phalanx  Diabetes mellitus type 2, last hemoglobin A1c 6.2 from October 2024  Atrial fibrillation  Hyperlipidemia  Obstructive sleep apnea  Anxiety depression.    Plan  Hyperkalemia most likely due to DEBBI with history of advanced CKD and solitary kidney   Lokelma daily, kayexalate PRN  Continue sodium bicarbonate by mouth 650 mg 3 times a day  Avoid nephrotoxins, hemodynamic instability and hypotension  Avoid contrast studies unless absolutely necessary  Dose all medication for GFR    Thank you for this consultation, we will follow along with you regarding care of this patient while the patient is here in the hospital,

## 2025-01-24 NOTE — PLAN OF CARE
Problem: Chronic Conditions and Co-morbidities  Goal: Patient's chronic conditions and co-morbidity symptoms are monitored and maintained or improved  Outcome: Progressing     Problem: Discharge Planning  Goal: Discharge to home or other facility with appropriate resources  Outcome: Progressing     Problem: Pain  Goal: Verbalizes/displays adequate comfort level or baseline comfort level  1/24/2025 1220 by Tamiko Bobby RN  Outcome: Adequate for Discharge  1/24/2025 0334 by Genie Craven RN  Outcome: Progressing  Flowsheets (Taken 1/24/2025 0334)  Verbalizes/displays adequate comfort level or baseline comfort level:   Encourage patient to monitor pain and request assistance   Administer analgesics based on type and severity of pain and evaluate response   Assess pain using appropriate pain scale   Implement non-pharmacological measures as appropriate and evaluate response     Problem: Safety - Adult  Goal: Free from fall injury  1/24/2025 1220 by Tamiko Bobby RN  Outcome: Adequate for Discharge  1/24/2025 0334 by Genie Craven RN  Outcome: Progressing  Flowsheets (Taken 1/24/2025 0334)  Free From Fall Injury: Instruct family/caregiver on patient safety     Problem: Nutrition Deficit:  Goal: Optimize nutritional status  Outcome: Adequate for Discharge

## 2025-01-24 NOTE — PROGRESS NOTES
Physical Therapy  Facility/Department: [facility]  Rehabilitation Physical Therapy Treatment Note    NAME: Prateek Denton II  : 1960 (65 y.o.)  MRN: [id number]  CODE STATUS: Full Code    Date of Service: 25       Restrictions:  Restrictions/Precautions: General Precautions, Fall Risk, Weight Bearing  Lower Extremity Weight Bearing Restrictions  Right Lower Extremity Weight Bearing: Weight Bearing As Tolerated  Position Activity Restriction  Other Position/Activity Restrictions: BUE IV     SUBJECTIVE  Subjective: pt supine in bed agreeable to PT bedside states he just got back to bed from bathroom and he does not want to get up again until his IV infusion is finished        OBJECTIVE  Cognition  Overall Cognitive Status: WFL  Safety Judgement: Decreased awareness of need for safety;Decreased awareness of need for assistance  Insights: Decreased awareness of deficits  Orientation  Overall Orientation Status: Within Functional Limits         ASSESSMENT/PROGRESS TOWARDS GOALS  Vital Signs  BP Location: Left upper arm  O2 Device: None (Room air)    Assessment  Activity Tolerance: pt agreeable to PT bedside this session, reports he just got back to bed. Pt completed multiple sets of 15 reps for SLR,. Hip add/abd, ankle pumps L LE x20, glut sets and quad sets         Discharge Recommendations: Patient would benefit from continued therapy after discharge    Goals  Patient Goals   Patient Goals : To go home  Short Term Goals  Time Frame for Short Term Goals: 12 visits  Short Term Goal 1: Pt to demonstrate bed mobility independently  Short Term Goal 2: Pt to perform STS transfers independently  Short Term Goal 3: Pt to ambulate at least 100ft w/o AD independently  Short Term Goal 4: Pt to ascend/descend 2 stairs w/ handrails SBA  Short Term Goal 5: Pt to actively participate in at least 30 minutes of physical therapy for ther act, ther ex, balance, gait, and endurance training    PLAN OF CARE/SAFETY  Physical

## 2025-01-25 LAB
ALBUMIN: 3.4 G/DL (ref 3.5–5.2)
ANION GAP SERPL CALCULATED.3IONS-SCNC: 13 MMOL/L (ref 9–16)
BASOPHILS # BLD: 0.04 K/UL (ref 0–0.2)
BASOPHILS # BLD: 0.05 K/UL (ref 0–0.2)
BASOPHILS NFR BLD: 1 % (ref 0–2)
BASOPHILS NFR BLD: 1 % (ref 0–2)
BUN SERPL-MCNC: 52 MG/DL (ref 8–23)
CALCIUM SERPL-MCNC: 9.3 MG/DL (ref 8.8–10.2)
CHLORIDE SERPL-SCNC: 102 MMOL/L (ref 98–107)
CO2 SERPL-SCNC: 21 MMOL/L (ref 20–31)
CREAT SERPL-MCNC: 3.6 MG/DL (ref 0.7–1.2)
CRP SERPL HS-MCNC: 47.6 MG/L (ref 0–5)
EOSINOPHIL # BLD: 0.32 K/UL (ref 0–0.44)
EOSINOPHIL # BLD: 0.35 K/UL (ref 0–0.44)
EOSINOPHILS RELATIVE PERCENT: 4 % (ref 1–4)
EOSINOPHILS RELATIVE PERCENT: 5 % (ref 1–4)
ERYTHROCYTE [DISTWIDTH] IN BLOOD BY AUTOMATED COUNT: 12.3 % (ref 11.8–14.4)
ERYTHROCYTE [DISTWIDTH] IN BLOOD BY AUTOMATED COUNT: 12.4 % (ref 11.8–14.4)
ERYTHROCYTE [SEDIMENTATION RATE] IN BLOOD BY PHOTOMETRIC METHOD: 76 MM/HR (ref 0–20)
GFR, ESTIMATED: 18 ML/MIN/1.73M2
GLUCOSE BLD-MCNC: 132 MG/DL (ref 75–110)
GLUCOSE BLD-MCNC: 135 MG/DL (ref 75–110)
GLUCOSE BLD-MCNC: 198 MG/DL (ref 75–110)
GLUCOSE BLD-MCNC: 92 MG/DL (ref 75–110)
GLUCOSE SERPL-MCNC: 92 MG/DL (ref 82–115)
HCT VFR BLD AUTO: 33 % (ref 40.7–50.3)
HCT VFR BLD AUTO: 33.4 % (ref 40.7–50.3)
HGB BLD-MCNC: 10.9 G/DL (ref 13–17)
HGB BLD-MCNC: 11.2 G/DL (ref 13–17)
IMM GRANULOCYTES # BLD AUTO: 0.07 K/UL (ref 0–0.3)
IMM GRANULOCYTES # BLD AUTO: 0.07 K/UL (ref 0–0.3)
IMM GRANULOCYTES NFR BLD: 1 %
IMM GRANULOCYTES NFR BLD: 1 %
LYMPHOCYTES NFR BLD: 1.15 K/UL (ref 1.1–3.7)
LYMPHOCYTES NFR BLD: 1.27 K/UL (ref 1.1–3.7)
LYMPHOCYTES RELATIVE PERCENT: 16 % (ref 24–43)
LYMPHOCYTES RELATIVE PERCENT: 17 % (ref 24–43)
MAGNESIUM SERPL-MCNC: 1.5 MG/DL (ref 1.6–2.4)
MCH RBC QN AUTO: 29.1 PG (ref 25.2–33.5)
MCH RBC QN AUTO: 29.5 PG (ref 25.2–33.5)
MCHC RBC AUTO-ENTMCNC: 33 G/DL (ref 28.4–34.8)
MCHC RBC AUTO-ENTMCNC: 33.5 G/DL (ref 28.4–34.8)
MCV RBC AUTO: 87.9 FL (ref 82.6–102.9)
MCV RBC AUTO: 88 FL (ref 82.6–102.9)
MICROORGANISM SPEC CULT: ABNORMAL
MICROORGANISM/AGENT SPEC: ABNORMAL
MONOCYTES NFR BLD: 0.73 K/UL (ref 0.1–1.2)
MONOCYTES NFR BLD: 0.74 K/UL (ref 0.1–1.2)
MONOCYTES NFR BLD: 10 % (ref 3–12)
MONOCYTES NFR BLD: 10 % (ref 3–12)
NEUTROPHILS NFR BLD: 66 % (ref 36–65)
NEUTROPHILS NFR BLD: 68 % (ref 36–65)
NEUTS SEG NFR BLD: 4.86 K/UL (ref 1.5–8.1)
NEUTS SEG NFR BLD: 4.93 K/UL (ref 1.5–8.1)
NRBC BLD-RTO: 0 PER 100 WBC
NRBC BLD-RTO: 0 PER 100 WBC
PHOSPHATE SERPL-MCNC: 3.9 MG/DL (ref 2.5–4.5)
PLATELET # BLD AUTO: 265 K/UL (ref 138–453)
PLATELET # BLD AUTO: 268 K/UL (ref 138–453)
PMV BLD AUTO: 10 FL (ref 8.1–13.5)
PMV BLD AUTO: 10.5 FL (ref 8.1–13.5)
POTASSIUM SERPL-SCNC: 4.6 MMOL/L (ref 3.7–5.3)
RBC # BLD AUTO: 3.75 M/UL (ref 4.21–5.77)
RBC # BLD AUTO: 3.8 M/UL (ref 4.21–5.77)
SODIUM SERPL-SCNC: 136 MMOL/L (ref 136–145)
SPECIMEN DESCRIPTION: ABNORMAL
WBC OTHER # BLD: 7.3 K/UL (ref 3.5–11.3)
WBC OTHER # BLD: 7.3 K/UL (ref 3.5–11.3)

## 2025-01-25 PROCEDURE — 99232 SBSQ HOSP IP/OBS MODERATE 35: CPT | Performed by: INTERNAL MEDICINE

## 2025-01-25 PROCEDURE — 6360000002 HC RX W HCPCS: Performed by: INTERNAL MEDICINE

## 2025-01-25 PROCEDURE — 6370000000 HC RX 637 (ALT 250 FOR IP): Performed by: EMERGENCY MEDICINE

## 2025-01-25 PROCEDURE — 97110 THERAPEUTIC EXERCISES: CPT

## 2025-01-25 PROCEDURE — 82947 ASSAY GLUCOSE BLOOD QUANT: CPT

## 2025-01-25 PROCEDURE — 6360000002 HC RX W HCPCS

## 2025-01-25 PROCEDURE — 80069 RENAL FUNCTION PANEL: CPT

## 2025-01-25 PROCEDURE — 85025 COMPLETE CBC W/AUTO DIFF WBC: CPT

## 2025-01-25 PROCEDURE — 85652 RBC SED RATE AUTOMATED: CPT

## 2025-01-25 PROCEDURE — 2500000003 HC RX 250 WO HCPCS

## 2025-01-25 PROCEDURE — 36415 COLL VENOUS BLD VENIPUNCTURE: CPT

## 2025-01-25 PROCEDURE — 6370000000 HC RX 637 (ALT 250 FOR IP)

## 2025-01-25 PROCEDURE — 2580000003 HC RX 258

## 2025-01-25 PROCEDURE — 86140 C-REACTIVE PROTEIN: CPT

## 2025-01-25 PROCEDURE — 6370000000 HC RX 637 (ALT 250 FOR IP): Performed by: NURSE PRACTITIONER

## 2025-01-25 PROCEDURE — 1200000000 HC SEMI PRIVATE

## 2025-01-25 PROCEDURE — 83735 ASSAY OF MAGNESIUM: CPT

## 2025-01-25 PROCEDURE — 97116 GAIT TRAINING THERAPY: CPT

## 2025-01-25 RX ORDER — MAGNESIUM SULFATE IN WATER 40 MG/ML
2000 INJECTION, SOLUTION INTRAVENOUS ONCE
Status: COMPLETED | OUTPATIENT
Start: 2025-01-25 | End: 2025-01-25

## 2025-01-25 RX ADMIN — SODIUM CHLORIDE, PRESERVATIVE FREE 10 ML: 5 INJECTION INTRAVENOUS at 08:52

## 2025-01-25 RX ADMIN — MAGNESIUM SULFATE HEPTAHYDRATE 2000 MG: 40 INJECTION, SOLUTION INTRAVENOUS at 14:00

## 2025-01-25 RX ADMIN — CEFEPIME 1000 MG: 1 INJECTION, POWDER, FOR SOLUTION INTRAMUSCULAR; INTRAVENOUS at 08:56

## 2025-01-25 RX ADMIN — SODIUM CHLORIDE, PRESERVATIVE FREE 10 ML: 5 INJECTION INTRAVENOUS at 21:10

## 2025-01-25 RX ADMIN — HEPARIN SODIUM 5000 UNITS: 5000 INJECTION INTRAVENOUS; SUBCUTANEOUS at 21:00

## 2025-01-25 RX ADMIN — Medication 1000 UNITS: at 20:54

## 2025-01-25 RX ADMIN — HEPARIN SODIUM 5000 UNITS: 5000 INJECTION INTRAVENOUS; SUBCUTANEOUS at 07:02

## 2025-01-25 RX ADMIN — OXYCODONE HYDROCHLORIDE 10 MG: 5 TABLET ORAL at 20:54

## 2025-01-25 RX ADMIN — INSULIN LISPRO 2 UNITS: 100 INJECTION, SOLUTION INTRAVENOUS; SUBCUTANEOUS at 21:00

## 2025-01-25 RX ADMIN — TRAZODONE HYDROCHLORIDE 50 MG: 50 TABLET ORAL at 20:54

## 2025-01-25 RX ADMIN — SODIUM BICARBONATE 650 MG: 650 TABLET ORAL at 08:52

## 2025-01-25 RX ADMIN — METOPROLOL 100 MG: 100 TABLET ORAL at 08:52

## 2025-01-25 RX ADMIN — SODIUM BICARBONATE 650 MG: 650 TABLET ORAL at 20:54

## 2025-01-25 RX ADMIN — HEPARIN SODIUM 5000 UNITS: 5000 INJECTION INTRAVENOUS; SUBCUTANEOUS at 13:42

## 2025-01-25 RX ADMIN — DILTIAZEM HYDROCHLORIDE 60 MG: 60 TABLET ORAL at 20:54

## 2025-01-25 RX ADMIN — OXYCODONE HYDROCHLORIDE 10 MG: 5 TABLET ORAL at 16:33

## 2025-01-25 RX ADMIN — DILTIAZEM HYDROCHLORIDE 60 MG: 60 TABLET ORAL at 08:52

## 2025-01-25 RX ADMIN — DULOXETINE 30 MG: 30 CAPSULE, DELAYED RELEASE ORAL at 08:52

## 2025-01-25 RX ADMIN — HYDROXYZINE HYDROCHLORIDE 50 MG: 25 TABLET ORAL at 20:55

## 2025-01-25 RX ADMIN — METOPROLOL 100 MG: 100 TABLET ORAL at 20:54

## 2025-01-25 RX ADMIN — SODIUM BICARBONATE 650 MG: 650 TABLET ORAL at 13:42

## 2025-01-25 RX ADMIN — HYDROXYZINE HYDROCHLORIDE 50 MG: 25 TABLET ORAL at 08:52

## 2025-01-25 RX ADMIN — CEFEPIME 1000 MG: 1 INJECTION, POWDER, FOR SOLUTION INTRAMUSCULAR; INTRAVENOUS at 21:12

## 2025-01-25 ASSESSMENT — PAIN DESCRIPTION - DESCRIPTORS
DESCRIPTORS: ACHING;DISCOMFORT
DESCRIPTORS: ACHING

## 2025-01-25 ASSESSMENT — PAIN SCALES - GENERAL
PAINLEVEL_OUTOF10: 7
PAINLEVEL_OUTOF10: 8
PAINLEVEL_OUTOF10: 4

## 2025-01-25 ASSESSMENT — PAIN DESCRIPTION - LOCATION
LOCATION: FOOT
LOCATION: FOOT

## 2025-01-25 ASSESSMENT — PAIN DESCRIPTION - ORIENTATION
ORIENTATION: RIGHT
ORIENTATION: RIGHT

## 2025-01-25 ASSESSMENT — PAIN - FUNCTIONAL ASSESSMENT: PAIN_FUNCTIONAL_ASSESSMENT: ACTIVITIES ARE NOT PREVENTED

## 2025-01-25 NOTE — PROGRESS NOTES
[facility]  Progress Note    Prateek Denotn II  MRN: [id number]  Date: 1/25/2025  LOS: 4     Reason for F/U :   Right great toe osteomyelitis and right calcaneal osteomyelitis    Impression :   Right great toe distal aspect diabetic foot ulceration with associated osteomyelitis on MRI  Right posterior heel diabetic ulceration with calcaneal osteomyelitis on MRI  Hyperkalemia  Diabetes mellitus type 2 with peripheral neuropathy  Atrial fibrillation  Acute on chronic kidney disease stage IV  Single right kidney since 2020  Hypertension  Hyperlipidemia    Recommendations:   The patient continues on IV antimicrobial therapy with cefepime and daptomycin   Surgery has been delayed and will potentially be done on Monday  Will continue to follow his clinical progress    Infection Control Recommendations:   Universal precautions    Discharge Planning:   Estimated Length of IV antimicrobials: To be determined  Patient will need Midline Catheter Insertion/ PICC line Insertion: No  Patient will need: Home IV , Infusion Center,  SNF,  LTAC: Undetermined  Patient willneed outpatient wound care: No    Medical Decision making / Summary of Stay:   Prateek Denton II is a 65 y.o.-year-old male who was initially admitted on 1/21/2025.  Patient has a known history of chronic kidney disease stage IV, left-sided nephrectomy in 2020, diabetes mellitus with peripheral neuropathy, atrial fibrillation, hypertension, hyperlipidemia.  Patient does report seeing podiatry quite some time ago for a left foot plantar aspect wound, which she used Medihoney and that went away.     Patient presented to the emergency department 1/21/2025 with complaints of right lower extremity swelling related to wounds on his foot.  Patient states that he has had a wound on his right heel for several months but about 2 weeks ago he noticed that the right lower extremity was becoming more red and swollen in addition to this particular wound

## 2025-01-25 NOTE — PROGRESS NOTES
Progress Note  Foot and Ankle Surgery    CC/Reason for consult:   Osteomyelitis, 1st digit  Osteomyelitis, direct extension, calcaneus right  Achilles tendon rupture, right leg  Abscess, achilles tendon, right leg      Interval history:  Patient seen and examined  No complaints or concerns  No issue overnight  Pain controlled  No dizziness.  Is reported.    Updated Labs:     WBC:   Lab Results   Component Value Date    WBC 7.3 01/25/2025     ESR:  Lab Results   Component Value Date    SEDRATE 76 (H) 01/25/2025     CRP:  Lab Results   Component Value Date    CRP 47.6 (H) 01/25/2025       HPI:   See consultation note for HPI.   ROS: Denies N/V/F/C/SOB/CP.  Otherwise negative except at stated in the HPI in consultation note.   Vitals:  Vitals:    01/25/25 1609   BP: (!) 141/80   Pulse: 65   Resp: 16   Temp: 97.9 °F (36.6 °C)   SpO2: 96%     I/O last 3 completed shifts:  In: 398.3 [P.O.:350; IV Piggyback:48.3]  Out: 1500 [Urine:1500]  Labs:  Recent Labs     01/25/25  0620 01/25/25  0706   WBC 7.3 7.3   HGB 10.9* 11.2*   HCT 33.0* 33.4*    268   NA  --  136   K  --  4.6   BUN  --  52*   CREATININE  --  3.6*   GLUCOSE  --  92   SEDRATE 76*  --    CRP 47.6*  --       CBC:  Recent Labs     01/23/25  0558 01/24/25  0616 01/25/25  0620 01/25/25  0706   WBC 7.6 8.9 7.3 7.3   HGB 10.8* 10.6* 10.9* 11.2*   HCT 32.7* 31.8* 33.0* 33.4*    266 265 268   CRP 75.0* 63.5* 47.6*  --       BMP:  Recent Labs     01/24/25  0616 01/24/25  1922 01/25/25  0706    135* 136   K 5.5* 5.2 4.6    102 102   CO2 18* 20 21   BUN 55* 52* 52*   CREATININE 3.7* 3.6* 3.6*   GLUCOSE 112 138* 92   CALCIUM 9.3 9.0 9.3      Coags:  No results for input(s): \"APTT\", \"INR\" in the last 72 hours.    Invalid input(s): \"PROT\"  Lab Results   Component Value Date    SEDRATE 76 (H) 01/25/2025     Recent Labs     01/23/25  0558 01/24/25  0616 01/25/25  0620   CRP 75.0* 63.5* 47.6*       PE:   Gen: Alert and oriented, NAD, cooperative.

## 2025-01-25 NOTE — PROGRESS NOTES
Physical Therapy  Facility/Department: [facility]   Physical Therapy Daily Treatment Note    Patient Name: Prateek Denton II        MRN: [id number]    : 1960    Date of Service: 2025    Chief Complaint   Patient presents with    Leg Swelling    Knee Pain    Leg Pain    Cough     Past Medical History:  has a past medical history of Acute blood loss anemia, Arthritis, Back pain, Cellulitis, CKD (chronic kidney disease) stage 3, GFR 30-59 ml/min (HCC), Colostomy RUQ, Frequent headaches, Gastrointestinal hemorrhage with melena, Gout, Hemorrhagic shock (HCC), Hernia of abdominal wall, History of atrial fibrillation, History of blood transfusion, [place] (hard of hearing), HTN, Hyperkalemia, Hyperlipidemia, Incisional hernia, Kidney infection, Large bowel obstruction (HCC), Morbid obesity due to excess calories, Paroxysmal A-fib (HCC), SBO (small bowel obstruction) (HCC), Sepsis (HCC), Single kidney, Sleep apnea, T2DM, Tooth missing, Under care of team, Upper GI bleed, Wears glasses, and Wellness examination.  Past Surgical History:  has a past surgical history that includes Ankle surgery (Right, ); Carpal tunnel release (Bilateral); Cystoscopy (Right, 2020); hc cath power picc triple (2020); Kidney surgery (Left, 2020); Small intestine surgery (N/A, 2020); laparotomy (N/A, 2020); Tonsillectomy; Knee arthroscopy (Bilateral); Adenoidectomy; cysto/uretero/pyeloscopy, calculus tx (Right, 10/30/2020); colostomy; Small intestine surgery (N/A, 2020); Tympanostomy tube placement; Upper gastrointestinal endoscopy (N/A, 2022); Colonoscopy (N/A, 2023); Upper gastrointestinal endoscopy (N/A, 2023); Upper gastrointestinal endoscopy (N/A, 2023); Upper gastrointestinal endoscopy (N/A, 2023); Upper gastrointestinal endoscopy (N/A, 2023); Upper gastrointestinal endoscopy (N/A, 2024); Incisional hernia repair (2024); and ventral hernia

## 2025-01-25 NOTE — PROGRESS NOTES
[facility]    Shaukat Rashid MD Zafar Magsi MD Danial Rashid MD John O. Ranker MD Sembria Ligibel, BRIT       Nephrology Consultation Note    Reason for Consult:    Date of Service: 01/25/25   PCP: Lisseth Coello, TORREY - CNP       Reason for Consult     Acute kidney injury  Hyperkalemia    Assessment and Plan     65 years old male patient with a past medical history significant for hypertension hyperlipidemia diabetes diabetic nephropathy status post left nephrectomy, presented with a chief complaints of right toe osteomyelitis.,  Apparently 2 weeks ago he noticed an ulcer on the right heel and bottom of the right great toe, despite local wound care, wound became worse, x-ray of the right foot shows osteomyelitis, patient was admitted to hospital medicine, podiatry consulted.  Nephrology was consulted for acute kidney injury and hyperkalemia.    Assessment  Acute on chronic kidney disease, baseline creatinine around 3 -3.5, GFR 20-24.   Most likely prerenal component and possible early ATN, renal ultrasound negative for obstruction  History of left nephrectomy  Hyperkalemia  Metabolic acidosis  Volume: Appears euvolemic  Hypertension: Stable  Hemodynamics: echo showing normal ejection fraction  Anemia of CKD  CKD MBD  Osteomyelitis of distal first phalanx  Diabetes mellitus type 2, last hemoglobin A1c 6.2 from October 2024  Atrial fibrillation  Hyperlipidemia  Obstructive sleep apnea  Anxiety depression.    Plan  Renal function stable, potassium is normal range  Continue Lokelma.  Continue sodium bicarbonate by mouth 650 mg 3 times a day  Avoid nephrotoxins, hemodynamic instability and hypotension  Avoid contrast studies unless absolutely necessary  Dose all medication for GFR    Thank you for this consultation, we will follow along with you regarding care of this patient while the patient is here in the hospital, please call if you have any questions or

## 2025-01-25 NOTE — PROGRESS NOTES
[facility]  Office: [phone]  Aung Luis DO, Mansoor Cline DO, Josse Han DO, Gregory Archibald DO, Andrew Roberts MD, Mayra Medel MD, Ray Johnson MD, Romy Amador MD,  Mic Meraz MD, Rom Hayes MD, Casandra Washington MD,  Ely Burks DO, Dorie Anderson MD, Marcos Travis MD, Trey Luis DO, Delia Clemens MD,  Jorge Major DO, Gisel Herbert MD, Rose Yi MD, Celia Guillen MD, Matthew Hyatt MD,  Sammy Galvez MD, Mary Kay Karimi MD, Edson Hay MD, Mateo Turner MD, Stan Cline MD, Radha Hoskins MD, Mikael Cm DO, Eran Mckenzie MD, Ely Blackman MD, Mohsin Reza, MD, Shirley Waterhouse, CNP,  Missy Chowdhury CNP, Mikael Cai, CNP,  Shantelle Monson, DNP, Betty Boles, CNP, Yany Garcia, CNP, Cathie Law, CNP, Lynda Russell, CNP, An King, PA-C, Zoraida Sherman PA-C, Gabby Vaughn, CNP, Saray Patton, CNP,  Lacey Wilkinson, CNP, Nidhi Sinha, CNP, Jaclyn Neff, CNP,  Patsy Peterson, CNS, Zulay Ramsay CNP, Martha Prince, CNP,   Marieal Benedict, CNP         [facility]   IN-PATIENT SERVICE   [facility]    Progress Note    1/25/2025    8:39 AM    Name:   Prateek Denton II  MRN:     [id number]     Acct:      [id number]   Room:   2011/2011-02  IP Day:  4  Admit Date:  1/21/2025  6:27 PM    PCP:   Lisseth Coello, TORREY - CNP  Code Status:  Full Code    Subjective:     C/C:   Chief Complaint   Patient presents with    Leg Swelling    Knee Pain    Leg Pain    Cough     Interval History Status: improved.     Feels better  Less heel pain    Denies cp/sob/n/v    Brief History:     Per my partner:  \"This is a 65-year-old male with longstanding diabetes that presents with a 2-week history of right heel ulcer as well as great toe ulcer. Wounds have been worsening with increasing pain and subsequently presented emergency room last evening. He has been for diabetic foot infection with ulceration. Podiatry and infectious  3.80*   HGB 10.8* 10.6* 10.9* 11.2*   HCT 32.7* 31.8* 33.0* 33.4*   MCV 88.1 87.4 88.0 87.9   MCH 29.1 29.1 29.1 29.5   MCHC 33.0 33.3 33.0 33.5   RDW 12.6 12.3 12.4 12.3    266 265 268   MPV 10.4 10.0 10.5 10.0   SEDRATE 73* 92* 76*  --    CRP 75.0* 63.5* 47.6*  --      Chemistry:  Recent Labs     01/22/25  1424 01/23/25  0920 01/24/25  0616 01/24/25 1922 01/25/25  0706   NA  --    < > 137 135* 136   K  --    < > 5.5* 5.2 4.6   CL  --    < > 105 102 102   CO2  --    < > 18* 20 21   GLUCOSE  --    < > 112 138* 92   BUN  --    < > 55* 52* 52*   CREATININE  --    < > 3.7* 3.6* 3.6*   MG  --   --   --   --  1.5*   ANIONGAP  --    < > 14 13 13   LABGLOM  --    < > 17* 18* 18*   CALCIUM  --    < > 9.3 9.0 9.3   PHOS  --   --  3.9  --  3.9   CKTOTAL 239  --   --   --   --     < > = values in this interval not displayed.     Recent Labs     01/23/25 2017 01/24/25  0629 01/24/25  1104 01/24/25  1531 01/24/25 1922 01/24/25 2014 01/25/25  0640   AST  --   --   --   --  19  --   --    ALT  --   --   --   --  19  --   --    ALKPHOS  --   --   --   --  86  --   --    BILITOT  --   --   --   --  0.2  --   --    POCGLU 131* 110 132* 138*  --  124* 92     ABG:  Lab Results   Component Value Date/Time    POCPH 7.180 02/29/2024 09:48 AM    PHART 7.363 09/23/2020 03:06 AM    POCPCO2 24.4 02/29/2024 09:48 AM    IPM8WHC 34.3 09/23/2020 03:06 AM    POCPO2 104.5 02/29/2024 09:48 AM    PO2ART 187.0 09/23/2020 03:06 AM    POCHCO3 9.1 02/29/2024 09:48 AM    QLJ0AIR 19.0 09/23/2020 03:06 AM    NBEA 17.4 02/29/2024 09:48 AM    PBEA NOT REPORTED 09/23/2020 03:06 AM    CRB0ZRS 26 09/03/2020 04:26 AM    KOLY4GNM 96.5 02/29/2024 09:48 AM    B3OFLPTN 99.3 09/23/2020 03:06 AM    FIO2 28.0 02/29/2024 09:48 AM     Lab Results   Component Value Date/Time    SPECIAL RAC 10ML 01/21/2025 09:44 PM     Lab Results   Component Value Date/Time    CULTURE STAPHYLOCOCCUS AUREUS LIGHT GROWTH (A) 01/22/2025 10:09 AM    CULTURE NORMAL SKIN MIGUEL

## 2025-01-25 NOTE — PLAN OF CARE
Pain controlled with oral Katherin, pt on IV antibiotics cefepime and cubicin, calls out appropriately.  Problem: Chronic Conditions and Co-morbidities  Goal: Patient's chronic conditions and co-morbidity symptoms are monitored and maintained or improved  Outcome: Progressing  Flowsheets (Taken 1/24/2025 2119)  Care Plan - Patient's Chronic Conditions and Co-Morbidity Symptoms are Monitored and Maintained or Improved: Monitor and assess patient's chronic conditions and comorbid symptoms for stability, deterioration, or improvement     Problem: Discharge Planning  Goal: Discharge to home or other facility with appropriate resources  Outcome: Progressing  Flowsheets (Taken 1/24/2025 2119)  Discharge to home or other facility with appropriate resources:   Identify barriers to discharge with patient and caregiver   Arrange for needed discharge resources and transportation as appropriate   Identify discharge learning needs (meds, wound care, etc)   Refer to discharge planning if patient needs post-hospital services based on physician order or complex needs related to functional status, cognitive ability or social support system     Problem: Pain  Goal: Verbalizes/displays adequate comfort level or baseline comfort level  Outcome: Progressing     Problem: Safety - Adult  Goal: Free from fall injury  Outcome: Progressing     Problem: Metabolic/Fluid and Electrolytes - Adult  Goal: Electrolytes maintained within normal limits  Outcome: Progressing  Flowsheets (Taken 1/24/2025 2119)  Electrolytes maintained within normal limits:   Monitor labs and assess patient for signs and symptoms of electrolyte imbalances   Administer electrolyte replacement as ordered  Goal: Glucose maintained within prescribed range  Outcome: Progressing  Flowsheets (Taken 1/24/2025 2119)  Glucose maintained within prescribed range:   Monitor blood glucose as ordered   Assess for signs and symptoms of hyperglycemia and hypoglycemia   Administer ordered  medications to maintain glucose within target range     Problem: Nutrition Deficit:  Goal: Optimize nutritional status  Outcome: Progressing     Problem: Neurosensory - Adult  Goal: Achieves stable or improved neurological status  Outcome: Progressing  Flowsheets (Taken 1/24/2025 2119)  Achieves stable or improved neurological status:   Assess for and report changes in neurological status   Maintain blood pressure and fluid volume within ordered parameters to optimize cerebral perfusion and minimize risk of hemorrhage  Goal: Achieves maximal functionality and self care  Outcome: Progressing  Flowsheets (Taken 1/24/2025 2119)  Achieves maximal functionality and self care: Monitor swallowing and airway patency with patient fatigue and changes in neurological status     Problem: Musculoskeletal - Adult  Goal: Return mobility to safest level of function  Outcome: Progressing  Flowsheets (Taken 1/24/2025 2119)  Return Mobility to Safest Level of Function:   Assess patient stability and activity tolerance for standing, transferring and ambulating with or without assistive devices   Assist with transfers and ambulation using safe patient handling equipment as needed   Ensure adequate protection for wounds/incisions during mobilization   Obtain physical therapy/occupational therapy consults as needed   Instruct patient/family in ordered activity level  Goal: Maintain proper alignment of affected body part  Outcome: Progressing  Flowsheets (Taken 1/24/2025 2119)  Maintain proper alignment of affected body part: Support and protect limb and body alignment per provider's orders

## 2025-01-25 NOTE — PLAN OF CARE
Problem: Chronic Conditions and Co-morbidities  Goal: Patient's chronic conditions and co-morbidity symptoms are monitored and maintained or improved  1/25/2025 1648 by Irais Calero RN  Outcome: Progressing  Flowsheets (Taken 1/25/2025 0900)  Care Plan - Patient's Chronic Conditions and Co-Morbidity Symptoms are Monitored and Maintained or Improved: Monitor and assess patient's chronic conditions and comorbid symptoms for stability, deterioration, or improvement     Problem: Discharge Planning  Goal: Discharge to home or other facility with appropriate resources  1/25/2025 1648 by Irais Calero RN  Outcome: Progressing  Flowsheets (Taken 1/25/2025 0900)  Discharge to home or other facility with appropriate resources:   Identify barriers to discharge with patient and caregiver   Arrange for needed discharge resources and transportation as appropriate   Identify discharge learning needs (meds, wound care, etc)   Refer to discharge planning if patient needs post-hospital services based on physician order or complex needs related to functional status, cognitive ability or social support system     Problem: Pain  Goal: Verbalizes/displays adequate comfort level or baseline comfort level  1/25/2025 1648 by Irais Calero RN  Outcome: Progressing     Problem: Safety - Adult  Goal: Free from fall injury  1/25/2025 1648 by Irais Calero RN  Outcome: Progressing     Problem: Metabolic/Fluid and Electrolytes - Adult  Goal: Electrolytes maintained within normal limits  1/25/2025 1648 by Irais Calero RN  Outcome: Progressing  Flowsheets (Taken 1/25/2025 0900)  Electrolytes maintained within normal limits:   Monitor labs and assess patient for signs and symptoms of electrolyte imbalances   Administer electrolyte replacement as ordered     Problem: Metabolic/Fluid and Electrolytes - Adult  Goal: Glucose maintained within prescribed range  1/25/2025 1648 by Irais Calero RN  Outcome: Progressing  Flowsheets (Taken 1/25/2025

## 2025-01-26 LAB
ANION GAP SERPL CALCULATED.3IONS-SCNC: 13 MMOL/L (ref 9–16)
BASOPHILS # BLD: 0.04 K/UL (ref 0–0.2)
BASOPHILS NFR BLD: 1 % (ref 0–2)
BUN SERPL-MCNC: 49 MG/DL (ref 8–23)
CALCIUM SERPL-MCNC: 9.1 MG/DL (ref 8.8–10.2)
CHLORIDE SERPL-SCNC: 103 MMOL/L (ref 98–107)
CO2 SERPL-SCNC: 20 MMOL/L (ref 20–31)
CREAT SERPL-MCNC: 3.6 MG/DL (ref 0.7–1.2)
CRP SERPL HS-MCNC: 38.6 MG/L (ref 0–5)
EOSINOPHIL # BLD: 0.3 K/UL (ref 0–0.44)
EOSINOPHILS RELATIVE PERCENT: 4 % (ref 1–4)
ERYTHROCYTE [DISTWIDTH] IN BLOOD BY AUTOMATED COUNT: 12.2 % (ref 11.8–14.4)
ERYTHROCYTE [SEDIMENTATION RATE] IN BLOOD BY PHOTOMETRIC METHOD: 90 MM/HR (ref 0–20)
GFR, ESTIMATED: 18 ML/MIN/1.73M2
GLUCOSE BLD-MCNC: 116 MG/DL (ref 75–110)
GLUCOSE BLD-MCNC: 132 MG/DL (ref 75–110)
GLUCOSE BLD-MCNC: 160 MG/DL (ref 75–110)
GLUCOSE BLD-MCNC: 176 MG/DL (ref 75–110)
GLUCOSE SERPL-MCNC: 105 MG/DL (ref 82–115)
HCT VFR BLD AUTO: 33.8 % (ref 40.7–50.3)
HGB BLD-MCNC: 11.1 G/DL (ref 13–17)
IMM GRANULOCYTES # BLD AUTO: 0.09 K/UL (ref 0–0.3)
IMM GRANULOCYTES NFR BLD: 1 %
LYMPHOCYTES NFR BLD: 1.23 K/UL (ref 1.1–3.7)
LYMPHOCYTES RELATIVE PERCENT: 17 % (ref 24–43)
MAGNESIUM SERPL-MCNC: 2 MG/DL (ref 1.6–2.4)
MCH RBC QN AUTO: 28.9 PG (ref 25.2–33.5)
MCHC RBC AUTO-ENTMCNC: 32.8 G/DL (ref 28.4–34.8)
MCV RBC AUTO: 88 FL (ref 82.6–102.9)
MICROORGANISM SPEC CULT: NORMAL
MONOCYTES NFR BLD: 0.76 K/UL (ref 0.1–1.2)
MONOCYTES NFR BLD: 10 % (ref 3–12)
NEUTROPHILS NFR BLD: 67 % (ref 36–65)
NEUTS SEG NFR BLD: 4.89 K/UL (ref 1.5–8.1)
NRBC BLD-RTO: 0 PER 100 WBC
PLATELET # BLD AUTO: 271 K/UL (ref 138–453)
PMV BLD AUTO: 9.9 FL (ref 8.1–13.5)
POTASSIUM SERPL-SCNC: 5.1 MMOL/L (ref 3.7–5.3)
RBC # BLD AUTO: 3.84 M/UL (ref 4.21–5.77)
SERVICE CMNT-IMP: NORMAL
SODIUM SERPL-SCNC: 136 MMOL/L (ref 136–145)
SPECIMEN DESCRIPTION: NORMAL
WBC OTHER # BLD: 7.3 K/UL (ref 3.5–11.3)

## 2025-01-26 PROCEDURE — 2580000003 HC RX 258: Performed by: NURSE PRACTITIONER

## 2025-01-26 PROCEDURE — 6370000000 HC RX 637 (ALT 250 FOR IP): Performed by: EMERGENCY MEDICINE

## 2025-01-26 PROCEDURE — 6360000002 HC RX W HCPCS

## 2025-01-26 PROCEDURE — 2500000003 HC RX 250 WO HCPCS

## 2025-01-26 PROCEDURE — 1200000000 HC SEMI PRIVATE

## 2025-01-26 PROCEDURE — 6360000002 HC RX W HCPCS: Performed by: NURSE PRACTITIONER

## 2025-01-26 PROCEDURE — 6370000000 HC RX 637 (ALT 250 FOR IP)

## 2025-01-26 PROCEDURE — 6370000000 HC RX 637 (ALT 250 FOR IP): Performed by: NURSE PRACTITIONER

## 2025-01-26 PROCEDURE — 99231 SBSQ HOSP IP/OBS SF/LOW 25: CPT | Performed by: INTERNAL MEDICINE

## 2025-01-26 PROCEDURE — 2580000003 HC RX 258

## 2025-01-26 RX ADMIN — DILTIAZEM HYDROCHLORIDE 60 MG: 60 TABLET ORAL at 20:58

## 2025-01-26 RX ADMIN — OXYCODONE HYDROCHLORIDE 10 MG: 5 TABLET ORAL at 21:15

## 2025-01-26 RX ADMIN — CEFEPIME 1000 MG: 1 INJECTION, POWDER, FOR SOLUTION INTRAMUSCULAR; INTRAVENOUS at 21:23

## 2025-01-26 RX ADMIN — DILTIAZEM HYDROCHLORIDE 60 MG: 60 TABLET ORAL at 08:30

## 2025-01-26 RX ADMIN — HYDROXYZINE HYDROCHLORIDE 50 MG: 25 TABLET ORAL at 08:30

## 2025-01-26 RX ADMIN — SODIUM ZIRCONIUM CYCLOSILICATE 10 G: 10 POWDER, FOR SUSPENSION ORAL at 10:14

## 2025-01-26 RX ADMIN — SODIUM CHLORIDE, PRESERVATIVE FREE 10 ML: 5 INJECTION INTRAVENOUS at 21:26

## 2025-01-26 RX ADMIN — METOPROLOL 100 MG: 100 TABLET ORAL at 08:30

## 2025-01-26 RX ADMIN — SODIUM BICARBONATE 650 MG: 650 TABLET ORAL at 15:40

## 2025-01-26 RX ADMIN — DULOXETINE 30 MG: 30 CAPSULE, DELAYED RELEASE ORAL at 08:30

## 2025-01-26 RX ADMIN — HEPARIN SODIUM 5000 UNITS: 5000 INJECTION INTRAVENOUS; SUBCUTANEOUS at 22:12

## 2025-01-26 RX ADMIN — METOPROLOL 100 MG: 100 TABLET ORAL at 20:58

## 2025-01-26 RX ADMIN — Medication 1000 UNITS: at 22:03

## 2025-01-26 RX ADMIN — TRAZODONE HYDROCHLORIDE 50 MG: 50 TABLET ORAL at 20:58

## 2025-01-26 RX ADMIN — HYDROXYZINE HYDROCHLORIDE 50 MG: 25 TABLET ORAL at 20:58

## 2025-01-26 RX ADMIN — HEPARIN SODIUM 5000 UNITS: 5000 INJECTION INTRAVENOUS; SUBCUTANEOUS at 06:43

## 2025-01-26 RX ADMIN — SODIUM BICARBONATE 650 MG: 650 TABLET ORAL at 08:30

## 2025-01-26 RX ADMIN — DAPTOMYCIN 600 MG: 500 INJECTION, POWDER, LYOPHILIZED, FOR SOLUTION INTRAVENOUS at 22:02

## 2025-01-26 RX ADMIN — HEPARIN SODIUM 5000 UNITS: 5000 INJECTION INTRAVENOUS; SUBCUTANEOUS at 15:40

## 2025-01-26 RX ADMIN — CEFEPIME 1000 MG: 1 INJECTION, POWDER, FOR SOLUTION INTRAMUSCULAR; INTRAVENOUS at 08:33

## 2025-01-26 RX ADMIN — SODIUM CHLORIDE, PRESERVATIVE FREE 10 ML: 5 INJECTION INTRAVENOUS at 08:30

## 2025-01-26 RX ADMIN — SODIUM BICARBONATE 650 MG: 650 TABLET ORAL at 20:58

## 2025-01-26 ASSESSMENT — PAIN SCALES - GENERAL
PAINLEVEL_OUTOF10: 8
PAINLEVEL_OUTOF10: 6

## 2025-01-26 ASSESSMENT — PAIN DESCRIPTION - LOCATION: LOCATION: ANKLE;FOOT

## 2025-01-26 ASSESSMENT — PAIN DESCRIPTION - ORIENTATION: ORIENTATION: RIGHT

## 2025-01-26 ASSESSMENT — PAIN - FUNCTIONAL ASSESSMENT: PAIN_FUNCTIONAL_ASSESSMENT: ACTIVITIES ARE NOT PREVENTED

## 2025-01-26 ASSESSMENT — PAIN DESCRIPTION - DESCRIPTORS: DESCRIPTORS: DULL;ACHING;THROBBING

## 2025-01-26 NOTE — PROGRESS NOTES
[facility]    Shaukat Rashid MD Zafar Magsi MD Danial Rashid MD John O. Ranker MD Sembria Ligibel, BRIT       Nephrology Consultation Note    Reason for Consult:    Date of Service: 01/26/25   PCP: Lisseth Coello, APRN - CNP       Reason for Consult     Acute kidney injury  Hyperkalemia    Assessment and Plan     65 years old male patient with a past medical history significant for hypertension hyperlipidemia diabetes diabetic nephropathy status post left nephrectomy, presented with a chief complaints of right toe osteomyelitis.,  Apparently 2 weeks ago he noticed an ulcer on the right heel and bottom of the right great toe, despite local wound care, wound became worse, x-ray of the right foot shows osteomyelitis, patient was admitted to hospital medicine, podiatry consulted.  Nephrology was consulted for acute kidney injury and hyperkalemia.    Assessment  Acute on chronic kidney disease, baseline creatinine around 3 -3.5, GFR 20-24.   Most likely prerenal component and possible early ATN, renal ultrasound negative for obstruction  History of left nephrectomy  Hyperkalemia  Metabolic acidosis  Volume: Appears euvolemic  Hypertension: Stable  Hemodynamics: echo showing normal ejection fraction  Anemia of CKD  CKD MBD  Osteomyelitis of distal first phalanx  Diabetes mellitus type 2, last hemoglobin A1c 6.2 from October 2024  Atrial fibrillation  Hyperlipidemia  Obstructive sleep apnea  Anxiety depression.    Plan  Renal function stable, potassium is normal range  Continue Lokelma.  Continue sodium bicarbonate by mouth 650 mg 3 times a day  Avoid nephrotoxins, hemodynamic instability and hypotension  Avoid contrast studies unless absolutely necessary  Dose all medication for GFR    Thank you for this consultation, we will follow along with you regarding care of this patient while the patient is here in the hospital, please call if you have any questions or

## 2025-01-26 NOTE — PROGRESS NOTES
[facility]  Office: [phone]  Aung Luis DO, Mansoor Cline DO, Josse Han DO, Gregory Archibald DO, Andrew Roberts MD, Mayra Medel MD, Ray Johnson MD, Romy Amador MD,  Mic Meraz MD, Rom Hayes MD, Casandra Washington MD,  Ely Burks DO, Dorie Anderson MD, Marcos Travis MD, Trey Luis DO, Delia Clemens MD,  Jorge Major DO, Gisel Herbert MD, Rose Yi MD, Celia Guillen MD, Matthew Hyatt MD,  Sammy Galvez MD, Mary Kay Karimi MD, Edson Hay MD, Mateo Turner MD, Stan Cline MD, Radha Hoskins MD, Mikael Cm DO, Eran Mckenzie MD, Ely Balckman MD, Mohsin Reza, MD, Shirley Waterhouse, CNP,  Missy Chowdhury CNP, Mikael Cai, CNP,  Shantelle Monson, DNP, Betty Boles, CNP, Yany Garcia, CNP, Cathie Law, CNP, Lynda Russell, CNP, An King, PA-C, Zoraida Sherman PA-C, Gabby Vaughn, CNP, Saray Patton, CNP,  Lacey Wilkinson, CNP, Ndihi Sinha, CNP, Jaclyn Neff, CNP,  Patsy Peterson, CNS, Zulay Ramsay CNP, Martha Prince, CNP,   Mariela Benedict, CNP         [facility]   IN-PATIENT SERVICE   [facility]    Progress Note    1/26/2025    9:46 AM    Name:   Prateek Denton II  MRN:     [id number]     Acct:      [id number]   Room:   2011/2011-02  IP Day:  5  Admit Date:  1/21/2025  6:27 PM    PCP:   Lisseth Coello, TORREY - CNP  Code Status:  Full Code    Subjective:     C/C:   Chief Complaint   Patient presents with    Leg Swelling    Knee Pain    Leg Pain    Cough     Interval History Status: improved.     Feels better  Less pain    Denies cp/sob/n/v    Brief History:     Per my partner:  \"This is a 65-year-old male with longstanding diabetes that presents with a 2-week history of right heel ulcer as well as great toe ulcer. Wounds have been worsening with increasing pain and subsequently presented emergency room last evening. He has been for diabetic foot infection with ulceration. Podiatry and infectious

## 2025-01-26 NOTE — PLAN OF CARE
Problem: Chronic Conditions and Co-morbidities  Goal: Patient's chronic conditions and co-morbidity symptoms are monitored and maintained or improved  1/26/2025 1614 by Irais Calero RN  Outcome: Progressing  Flowsheets (Taken 1/26/2025 0830)  Care Plan - Patient's Chronic Conditions and Co-Morbidity Symptoms are Monitored and Maintained or Improved: Monitor and assess patient's chronic conditions and comorbid symptoms for stability, deterioration, or improvement     Problem: Discharge Planning  Goal: Discharge to home or other facility with appropriate resources  1/26/2025 1614 by Irais Calero RN  Outcome: Progressing  Flowsheets (Taken 1/26/2025 0830)  Discharge to home or other facility with appropriate resources:   Identify barriers to discharge with patient and caregiver   Arrange for needed discharge resources and transportation as appropriate   Identify discharge learning needs (meds, wound care, etc)   Refer to discharge planning if patient needs post-hospital services based on physician order or complex needs related to functional status, cognitive ability or social support system     Problem: Pain  Goal: Verbalizes/displays adequate comfort level or baseline comfort level  1/26/2025 1614 by Irais Calero RN  Outcome: Progressing     Problem: Safety - Adult  Goal: Free from fall injury  1/26/2025 1614 by Irais Calero RN  Outcome: Progressing     Problem: Metabolic/Fluid and Electrolytes - Adult  Goal: Electrolytes maintained within normal limits  1/26/2025 1614 by Irais Calero RN  Outcome: Progressing  Flowsheets (Taken 1/26/2025 0830)  Electrolytes maintained within normal limits:   Monitor labs and assess patient for signs and symptoms of electrolyte imbalances   Administer electrolyte replacement as ordered     Problem: Metabolic/Fluid and Electrolytes - Adult  Goal: Glucose maintained within prescribed range  1/26/2025 1614 by Irais Calero RN  Outcome: Progressing  Flowsheets (Taken 1/26/2025

## 2025-01-26 NOTE — PLAN OF CARE
Problem: Chronic Conditions and Co-morbidities  Goal: Patient's chronic conditions and co-morbidity symptoms are monitored and maintained or improved  1/26/2025 0438 by Mukund Denton RN  Outcome: Progressing  Flowsheets (Taken 1/25/2025 2011)  Care Plan - Patient's Chronic Conditions and Co-Morbidity Symptoms are Monitored and Maintained or Improved: Monitor and assess patient's chronic conditions and comorbid symptoms for stability, deterioration, or improvement     Problem: Discharge Planning  Goal: Discharge to home or other facility with appropriate resources  1/26/2025 0438 by Mukund Denton RN  Outcome: Progressing  Flowsheets (Taken 1/25/2025 2011)  Discharge to home or other facility with appropriate resources:   Identify barriers to discharge with patient and caregiver   Arrange for needed discharge resources and transportation as appropriate   Identify discharge learning needs (meds, wound care, etc)   Refer to discharge planning if patient needs post-hospital services based on physician order or complex needs related to functional status, cognitive ability or social support system     Problem: Pain  Goal: Verbalizes/displays adequate comfort level or baseline comfort level  1/26/2025 0438 by Mukund Denton RN  Outcome: Progressing     Problem: Safety - Adult  Goal: Free from fall injury  1/26/2025 0438 by Mukund Denton RN  Outcome: Progressing     Problem: Metabolic/Fluid and Electrolytes - Adult  Goal: Electrolytes maintained within normal limits  1/26/2025 0438 by Mukund Denton RN  Outcome: Progressing  Flowsheets (Taken 1/25/2025 2011)  Electrolytes maintained within normal limits:   Monitor labs and assess patient for signs and symptoms of electrolyte imbalances   Administer electrolyte replacement as ordered     Problem: Metabolic/Fluid and Electrolytes - Adult  Goal: Glucose maintained within prescribed range  1/26/2025 0438 by Mukund Denton RN  Outcome:

## 2025-01-26 NOTE — PROGRESS NOTES
Progress Note  Foot and Ankle Surgery    CC/Reason for consult:   Osteomyelitis, 1st digit  Osteomyelitis, direct extension, calcaneus right  Achilles tendon rupture, right leg  Abscess, achilles tendon, right leg      Interval history:  Patient seen and examined  No complaints or concerns  No dizziness reported.  Potassium normalized.  Patient is aware that he will be going to surgery likely this week.    Updated Labs:     WBC:   Lab Results   Component Value Date    WBC 7.3 01/26/2025     ESR:  Lab Results   Component Value Date    SEDRATE 90 (H) 01/26/2025     CRP:  Lab Results   Component Value Date    CRP 38.6 (H) 01/26/2025       HPI:   See consultation note for HPI.   ROS: Denies N/V/F/C/SOB/CP.  Otherwise negative except at stated in the HPI in consultation note.   Vitals:  Vitals:    01/26/25 0714   BP: (!) 159/79   Pulse: 55   Resp: 17   Temp: 98.1 °F (36.7 °C)   SpO2: 98%     I/O last 3 completed shifts:  In: -   Out: 880 [Urine:880]  Labs:  Recent Labs     01/26/25  0641   WBC 7.3   HGB 11.1*   HCT 33.8*         K 5.1   BUN 49*   CREATININE 3.6*   GLUCOSE 105   SEDRATE 90*   CRP 38.6*      CBC:  Recent Labs     01/24/25  0616 01/25/25  0620 01/25/25  0706 01/26/25  0641   WBC 8.9 7.3 7.3 7.3   HGB 10.6* 10.9* 11.2* 11.1*   HCT 31.8* 33.0* 33.4* 33.8*    265 268 271   CRP 63.5* 47.6*  --  38.6*      BMP:  Recent Labs     01/24/25  1922 01/25/25  0706 01/26/25  0641   * 136 136   K 5.2 4.6 5.1    102 103   CO2 20 21 20   BUN 52* 52* 49*   CREATININE 3.6* 3.6* 3.6*   GLUCOSE 138* 92 105   CALCIUM 9.0 9.3 9.1      Coags:  No results for input(s): \"APTT\", \"INR\" in the last 72 hours.    Invalid input(s): \"PROT\"  Lab Results   Component Value Date    SEDRATE 90 (H) 01/26/2025     Recent Labs     01/24/25  0616 01/25/25  0620 01/26/25  0641   CRP 63.5* 47.6* 38.6*       PE:   Gen: Alert and oriented, NAD, cooperative.   Head: Normocephalic, atraumatic.  Cardiovascular: Normal  extremity. Pt can ambulate to opposite extremity with assistive device. PRAFO ordered right   Encourage PT/ OT. Assitive device per PT/OT recommendations  -Nurse to change dressing, instructions in chart.  -Please PerfectServe us with any questions.        Nathaly Thompson DPM   Foot and Ankle Surgery   1/26/2025 at 1:25 PM

## 2025-01-27 ENCOUNTER — TELEPHONE (OUTPATIENT)
Dept: FAMILY MEDICINE CLINIC | Age: 65
End: 2025-01-27

## 2025-01-27 LAB
ANION GAP SERPL CALCULATED.3IONS-SCNC: 12 MMOL/L (ref 9–16)
BASOPHILS # BLD: 0.08 K/UL (ref 0–0.2)
BASOPHILS NFR BLD: 1 %
BUN SERPL-MCNC: 45 MG/DL (ref 8–23)
CALCIUM SERPL-MCNC: 9.5 MG/DL (ref 8.8–10.2)
CHLORIDE SERPL-SCNC: 105 MMOL/L (ref 98–107)
CO2 SERPL-SCNC: 23 MMOL/L (ref 20–31)
CREAT SERPL-MCNC: 3.5 MG/DL (ref 0.7–1.2)
EOSINOPHIL # BLD: 0.34 K/UL (ref 0–0.4)
EOSINOPHILS RELATIVE PERCENT: 4 % (ref 1–4)
ERYTHROCYTE [DISTWIDTH] IN BLOOD BY AUTOMATED COUNT: 12.3 % (ref 11.8–14.4)
GFR, ESTIMATED: 18 ML/MIN/1.73M2
GLUCOSE BLD-MCNC: 138 MG/DL (ref 75–110)
GLUCOSE BLD-MCNC: 149 MG/DL (ref 75–110)
GLUCOSE BLD-MCNC: 160 MG/DL (ref 75–110)
GLUCOSE BLD-MCNC: 93 MG/DL (ref 75–110)
GLUCOSE SERPL-MCNC: 90 MG/DL (ref 82–115)
HCT VFR BLD AUTO: 35.8 % (ref 40.7–50.3)
HGB BLD-MCNC: 11.7 G/DL (ref 13–17)
IMM GRANULOCYTES # BLD AUTO: 0.17 K/UL (ref 0–0.3)
IMM GRANULOCYTES NFR BLD: 2 %
LYMPHOCYTES NFR BLD: 1.93 K/UL (ref 1–4.8)
LYMPHOCYTES RELATIVE PERCENT: 23 % (ref 24–44)
MCH RBC QN AUTO: 28.8 PG (ref 25.2–33.5)
MCHC RBC AUTO-ENTMCNC: 32.7 G/DL (ref 28.4–34.8)
MCV RBC AUTO: 88.2 FL (ref 82.6–102.9)
MICROORGANISM SPEC CULT: NORMAL
MONOCYTES NFR BLD: 0.84 K/UL (ref 0.2–0.8)
MONOCYTES NFR BLD: 10 % (ref 1–7)
NEUTROPHILS NFR BLD: 60 % (ref 36–66)
NEUTS SEG NFR BLD: 5.04 K/UL (ref 1.8–7.7)
NRBC BLD-RTO: 0 PER 100 WBC
PLATELET # BLD AUTO: 302 K/UL (ref 138–453)
PMV BLD AUTO: 10.2 FL (ref 8.1–13.5)
POTASSIUM SERPL-SCNC: 5.2 MMOL/L (ref 3.7–5.3)
RBC # BLD AUTO: 4.06 M/UL (ref 4.21–5.77)
SERVICE CMNT-IMP: NORMAL
SODIUM SERPL-SCNC: 140 MMOL/L (ref 136–145)
SPECIMEN DESCRIPTION: NORMAL
WBC OTHER # BLD: 8.4 K/UL (ref 3.5–11.3)

## 2025-01-27 PROCEDURE — 1200000000 HC SEMI PRIVATE

## 2025-01-27 PROCEDURE — 99232 SBSQ HOSP IP/OBS MODERATE 35: CPT | Performed by: INTERNAL MEDICINE

## 2025-01-27 PROCEDURE — 82947 ASSAY GLUCOSE BLOOD QUANT: CPT

## 2025-01-27 PROCEDURE — 36415 COLL VENOUS BLD VENIPUNCTURE: CPT

## 2025-01-27 PROCEDURE — 97116 GAIT TRAINING THERAPY: CPT

## 2025-01-27 PROCEDURE — 80048 BASIC METABOLIC PNL TOTAL CA: CPT

## 2025-01-27 PROCEDURE — 6370000000 HC RX 637 (ALT 250 FOR IP): Performed by: NURSE PRACTITIONER

## 2025-01-27 PROCEDURE — 85025 COMPLETE CBC W/AUTO DIFF WBC: CPT

## 2025-01-27 PROCEDURE — 97110 THERAPEUTIC EXERCISES: CPT

## 2025-01-27 PROCEDURE — 6370000000 HC RX 637 (ALT 250 FOR IP): Performed by: EMERGENCY MEDICINE

## 2025-01-27 PROCEDURE — 6370000000 HC RX 637 (ALT 250 FOR IP)

## 2025-01-27 PROCEDURE — 2580000003 HC RX 258

## 2025-01-27 PROCEDURE — 99231 SBSQ HOSP IP/OBS SF/LOW 25: CPT | Performed by: INTERNAL MEDICINE

## 2025-01-27 PROCEDURE — 6360000002 HC RX W HCPCS

## 2025-01-27 PROCEDURE — 2500000003 HC RX 250 WO HCPCS

## 2025-01-27 RX ADMIN — SODIUM ZIRCONIUM CYCLOSILICATE 10 G: 10 POWDER, FOR SUSPENSION ORAL at 20:11

## 2025-01-27 RX ADMIN — OXYCODONE HYDROCHLORIDE 10 MG: 5 TABLET ORAL at 22:25

## 2025-01-27 RX ADMIN — HEPARIN SODIUM 5000 UNITS: 5000 INJECTION INTRAVENOUS; SUBCUTANEOUS at 05:37

## 2025-01-27 RX ADMIN — Medication 1000 UNITS: at 22:25

## 2025-01-27 RX ADMIN — DILTIAZEM HYDROCHLORIDE 60 MG: 60 TABLET ORAL at 08:45

## 2025-01-27 RX ADMIN — SODIUM CHLORIDE, PRESERVATIVE FREE 10 ML: 5 INJECTION INTRAVENOUS at 08:43

## 2025-01-27 RX ADMIN — HEPARIN SODIUM 5000 UNITS: 5000 INJECTION INTRAVENOUS; SUBCUTANEOUS at 22:25

## 2025-01-27 RX ADMIN — TRAZODONE HYDROCHLORIDE 50 MG: 50 TABLET ORAL at 20:16

## 2025-01-27 RX ADMIN — CEFEPIME 1000 MG: 1 INJECTION, POWDER, FOR SOLUTION INTRAMUSCULAR; INTRAVENOUS at 20:30

## 2025-01-27 RX ADMIN — CEFEPIME 1000 MG: 1 INJECTION, POWDER, FOR SOLUTION INTRAMUSCULAR; INTRAVENOUS at 09:05

## 2025-01-27 RX ADMIN — HEPARIN SODIUM 5000 UNITS: 5000 INJECTION INTRAVENOUS; SUBCUTANEOUS at 14:10

## 2025-01-27 RX ADMIN — SODIUM BICARBONATE 650 MG: 650 TABLET ORAL at 20:14

## 2025-01-27 RX ADMIN — SODIUM BICARBONATE 650 MG: 650 TABLET ORAL at 08:43

## 2025-01-27 RX ADMIN — METOPROLOL 100 MG: 100 TABLET ORAL at 08:45

## 2025-01-27 RX ADMIN — METOPROLOL 100 MG: 100 TABLET ORAL at 20:13

## 2025-01-27 RX ADMIN — SODIUM BICARBONATE 650 MG: 650 TABLET ORAL at 14:08

## 2025-01-27 RX ADMIN — HYDROXYZINE HYDROCHLORIDE 50 MG: 25 TABLET ORAL at 20:13

## 2025-01-27 RX ADMIN — HYDROXYZINE HYDROCHLORIDE 50 MG: 25 TABLET ORAL at 08:43

## 2025-01-27 RX ADMIN — DILTIAZEM HYDROCHLORIDE 60 MG: 60 TABLET ORAL at 20:13

## 2025-01-27 RX ADMIN — DULOXETINE 30 MG: 30 CAPSULE, DELAYED RELEASE ORAL at 08:43

## 2025-01-27 RX ADMIN — SODIUM CHLORIDE, PRESERVATIVE FREE 10 ML: 5 INJECTION INTRAVENOUS at 22:27

## 2025-01-27 RX ADMIN — SODIUM ZIRCONIUM CYCLOSILICATE 10 G: 10 POWDER, FOR SUSPENSION ORAL at 08:43

## 2025-01-27 ASSESSMENT — PAIN DESCRIPTION - LOCATION: LOCATION: ANKLE;FOOT

## 2025-01-27 ASSESSMENT — ENCOUNTER SYMPTOMS
RESPIRATORY NEGATIVE: 1
GASTROINTESTINAL NEGATIVE: 1

## 2025-01-27 ASSESSMENT — PAIN DESCRIPTION - ORIENTATION: ORIENTATION: RIGHT

## 2025-01-27 ASSESSMENT — PAIN SCALES - GENERAL
PAINLEVEL_OUTOF10: 7
PAINLEVEL_OUTOF10: 3

## 2025-01-27 ASSESSMENT — PAIN - FUNCTIONAL ASSESSMENT: PAIN_FUNCTIONAL_ASSESSMENT: ACTIVITIES ARE NOT PREVENTED

## 2025-01-27 NOTE — PLAN OF CARE
Problem: Safety - Adult  Goal: Free from fall injury  Outcome: Progressing  Flowsheets (Taken 1/27/2025 1608)  Free From Fall Injury:   Instruct family/caregiver on patient safety   Based on caregiver fall risk screen, instruct family/caregiver to ask for assistance with transferring infant if caregiver noted to have fall risk factors  Note: Patient is a fall risk during this admission. Fall risk assessment was performed. Patient is absent of falls. Bed is in the lowest position. Wheels on the bed are locked. Call light and bed side table are within reach. Clutter is removed. Patient was educated to call out when needing assistance or wanting to get out of bed. Patient offered toileting assistance during rounding. Hourly rounds have been performed.

## 2025-01-27 NOTE — TELEPHONE ENCOUNTER
Stacie from [facility] contacted office requesting verbal approval for home care.       Is this okay?

## 2025-01-27 NOTE — CARE COORDINATION
DC Planning      Spoke with Olena from [facility] pt is covered at 100 for IV ABX pt does have nursing coverage thru [facility] if needs IV only for home.

## 2025-01-27 NOTE — PROGRESS NOTES
Physical Therapy  Facility/Department: [facility] MED [facility]  Rehabilitation Physical Therapy Treatment Note    NAME: Prateek Denton II  : 1960 (65 y.o.)  MRN: [id number]  CODE STATUS: Full Code    Date of Service: 25     Due to recent hospitalization and medical condition, pt would benefit from additional intermittent skilled therapy at time of discharge.  Please refer to the AM-PAC score for current functional status.      Restrictions:  Restrictions/Precautions: General Precautions, Fall Risk, Weight Bearing  Lower Extremity Weight Bearing Restrictions  Right Lower Extremity Weight Bearing: Non Weight Bearing  Position Activity Restriction  Other Position/Activity Restrictions: BUE IV     SUBJECTIVE  Subjective: Patient awake supine in bed upon therapists arrival. Patient agreeable to PT treatment. RN states patient is appropriate for PT treatment.  Patient denies dizziness and lightheadedness       OBJECTIVE  Cognition  Overall Cognitive Status: WFL  Safety Judgement: Decreased awareness of need for safety;Decreased awareness of need for assistance  Insights: Decreased awareness of deficits  Orientation  Overall Orientation Status: Within Functional Limits    Functional Mobility  Bed Mobility  Overall Assistance Level: Stand By Assist  Additional Factors: Verbal cues;Increased time to complete;With handrails;Head of bed flat  Roll Left  Assistance Level: Stand by assist  Roll Right  Assistance Level: Stand by assist  Sit to Supine  Assistance Level: Stand by assist  Supine to Sit  Assistance Level: Stand by assist  Scooting  Assistance Level: Stand by assist  Skilled Clinical Factors: Patient with good bed moblity technique slightly impulsive requires vc's for self pacing  Balance  Sitting Balance: Supervision  Standing Balance: Stand by assistance  Standing Balance  Time: 2-3 minutes  Activity: Standing in front of EOB working on core control and stability with UE support on RW this

## 2025-01-27 NOTE — PLAN OF CARE
Patient admitted with osteomyelitis of right great toe and heel. Patient is NWB to [facility]. Surgery was delayed due to hyperkalemia. Plan from podiatry is for surgery some time this week if patient is medically stable, no date or time is set yet.     Problem: Chronic Conditions and Co-morbidities  Goal: Patient's chronic conditions and co-morbidity symptoms are monitored and maintained or improved  1/27/2025 0058 by Ashley Lloyd RN  Flowsheets (Taken 1/27/2025 0058)  Care Plan - Patient's Chronic Conditions and Co-Morbidity Symptoms are Monitored and Maintained or Improved:   Monitor and assess patient's chronic conditions and comorbid symptoms for stability, deterioration, or improvement   Collaborate with multidisciplinary team to address chronic and comorbid conditions and prevent exacerbation or deterioration     Problem: Discharge Planning  Goal: Discharge to home or other facility with appropriate resources  1/27/2025 0058 by Ashley Lloyd RN  Flowsheets (Taken 1/27/2025 0058)  Discharge to home or other facility with appropriate resources:   Identify barriers to discharge with patient and caregiver   Arrange for needed discharge resources and transportation as appropriate   Identify discharge learning needs (meds, wound care, etc)     Problem: Pain  Goal: Verbalizes/displays adequate comfort level or baseline comfort level  1/27/2025 0059 by Ashley Lloyd RN  Outcome: Progressing   Encourage patient to monitor pain and request assistance   Administer analgesics based on type and severity of pain and evaluate response   Assess pain using appropriate pain scale   Implement non-pharmacological measures as appropriate and evaluate response     Problem: Skin/Tissue Integrity - Adult  Goal: Skin integrity remains intact  Outcome: Progressing  Flowsheets (Taken 1/27/2025 0059)  Skin Integrity Remains Intact: Monitor for areas of redness and/or skin breakdown     Problem: Skin/Tissue Integrity - Adult  Goal:

## 2025-01-27 NOTE — PROGRESS NOTES
Progress Note  Foot and Ankle Surgery    CC/Reason for consult:   Osteomyelitis, 1st digit  Osteomyelitis, direct extension, calcaneus right  Achilles tendon rupture, right leg  Abscess, achilles tendon, right leg      Interval history:  Patient seen and examined  No acute complaints.  No nausea or vomiting, potassium currently normalized.  Made patient aware that he may be allowed to be discharged, and received surgery as an outpatient.    Updated Labs:     WBC:   Lab Results   Component Value Date    WBC 8.4 01/27/2025     ESR:  Lab Results   Component Value Date    SEDRATE 90 (H) 01/26/2025     CRP:  Lab Results   Component Value Date    CRP 38.6 (H) 01/26/2025       HPI:   See consultation note for HPI.   ROS: Denies N/V/F/C/SOB/CP.  Otherwise negative except at stated in the HPI in consultation note.   Vitals:  Vitals:    01/27/25 0818   BP: (!) 152/87   Pulse: 75   Resp: 18   Temp: 98.2 °F (36.8 °C)   SpO2: 100%     I/O last 3 completed shifts:  In: 810.8 [P.O.:400; IV Piggyback:410.8]  Out: 1430 [Urine:1430]  Labs:  Recent Labs     01/26/25  0641 01/27/25  0621   WBC 7.3 8.4   HGB 11.1* 11.7*   HCT 33.8* 35.8*    302    140   K 5.1 5.2   BUN 49* 45*   CREATININE 3.6* 3.5*   GLUCOSE 105 90   SEDRATE 90*  --    CRP 38.6*  --       CBC:  Recent Labs     01/25/25  0620 01/25/25  0706 01/26/25  0641 01/27/25  0621   WBC 7.3 7.3 7.3 8.4   HGB 10.9* 11.2* 11.1* 11.7*   HCT 33.0* 33.4* 33.8* 35.8*    268 271 302   CRP 47.6*  --  38.6*  --       BMP:  Recent Labs     01/25/25  0706 01/26/25  0641 01/27/25  0621    136 140   K 4.6 5.1 5.2    103 105   CO2 21 20 23   BUN 52* 49* 45*   CREATININE 3.6* 3.6* 3.5*   GLUCOSE 92 105 90   CALCIUM 9.3 9.1 9.5      Coags:  No results for input(s): \"APTT\", \"INR\" in the last 72 hours.    Invalid input(s): \"PROT\"  Lab Results   Component Value Date    SEDRATE 90 (H) 01/26/2025     Recent Labs     01/25/25  0620 01/26/25  0641   CRP 47.6* 38.6*

## 2025-01-27 NOTE — PROGRESS NOTES
[facility]  Progress Note    Prateek Denton II  MRN: [id number]  Date: 1/27/2025  LOS: 6     Reason for F/U :   Right great toe osteomyelitis and right calcaneal osteomyelitis    Impression :   Right great toe distal aspect diabetic foot ulceration with associated osteomyelitis on MRI  Right posterior heel diabetic ulceration with calcaneal osteomyelitis on MRI  Hyperkalemia  Diabetes mellitus type 2 with peripheral neuropathy  Atrial fibrillation  Acute on chronic kidney disease stage IV  Single right kidney since 2020  Hypertension  Hyperlipidemia    Recommendations:   The patient continues on IV antimicrobial therapy with cefepime and daptomycin   There are no plans for any surgery today and there has been some discussion about the patient being potentially discharged home and coming in for outpatient surgery  It is not clear to me what the definitive plan is but if the patient will undergo toe amputation this should definitively take care of the osteomyelitis but the concern will be the osteomyelitis of the calcaneus  The patient will likely need IV antibiotic therapy at discharge  We will await the final arrangements and make recommendations thereafter    Infection Control Recommendations:   Universal precautions    Discharge Planning:   Estimated Length of IV antimicrobials: To be determined  Patient will need Midline Catheter Insertion/ PICC line Insertion: No  Patient will need: Home IV , Infusion Center,  SNF,  LTAC: Undetermined  Patient willneed outpatient wound care: No    Medical Decision making / Summary of Stay:   Prateek Denton II is a 65 y.o.-year-old male who was initially admitted on 1/21/2025.  Patient has a known history of chronic kidney disease stage IV, left-sided nephrectomy in 2020, diabetes mellitus with peripheral neuropathy, atrial fibrillation, hypertension, hyperlipidemia.  Patient does report seeing podiatry quite some time ago for a left foot plantar aspect

## 2025-01-27 NOTE — PROGRESS NOTES
[facility]    Shaukat Rashid MD Zafar Magsi MD Danial Rashid MD John O. Ranker MD Sembria Ligibel, BRIT       Nephrology Consultation Note    Reason for Consult:    Date of Service: 01/27/25   PCP: Lisseth Coello, TORREY - CNP       Reason for Consult     Acute kidney injury  Hyperkalemia    Assessment and Plan     65 years old male patient with a past medical history significant for hypertension hyperlipidemia diabetes diabetic nephropathy status post left nephrectomy, presented with a chief complaints of right toe osteomyelitis.,  Apparently 2 weeks ago he noticed an ulcer on the right heel and bottom of the right great toe, despite local wound care, wound became worse, x-ray of the right foot shows osteomyelitis, patient was admitted to hospital medicine, podiatry consulted.  Nephrology was consulted for acute kidney injury and hyperkalemia.    Assessment  Acute on chronic kidney disease, baseline creatinine around 3 -3.5, GFR 20-24.   Most likely prerenal component and possible early ATN, renal ultrasound negative for obstruction  History of left nephrectomy  Hyperkalemia  Metabolic acidosis  Volume: Appears euvolemic  Hypertension: Stable  Hemodynamics: echo showing normal ejection fraction  Anemia of CKD  CKD MBD  Osteomyelitis of distal first phalanx  Diabetes mellitus type 2, last hemoglobin A1c 6.2 from October 2024  Atrial fibrillation  Hyperlipidemia  Obstructive sleep apnea  Anxiety depression.    Plan  Renal function stable, potassium is normal range  Continue Lokelma.  Continue sodium bicarbonate by mouth 650 mg 3 times a day  Avoid nephrotoxins, hemodynamic instability and hypotension  Avoid contrast studies unless absolutely necessary  Dose all medication for GFR    Thank you for this consultation, we will follow along with you regarding care of this patient while the patient is here in the hospital, please call if you have any questions or

## 2025-01-27 NOTE — CARE COORDINATION
DC Planning    Spoke with pt at bedside, surgery scheduled 1/30/25 w podiatry.  Pt cont on Dapto/Cefepime. ID recs pending. Pt is from home w wife, had IV ABX years ago. Pt has a walker and sc. [facility] following. K+ 5.2 today.

## 2025-01-27 NOTE — PROGRESS NOTES
[facility]  Office: [phone]  Aung Luis DO, Mansoor Cline DO, Josse Hna DO, Gregory Archibald DO, Andrew Roberts MD, Mayra Medel MD, Ray Johnson MD, Romy Amador MD,  Mic Meraz MD, Rom Hayes MD, Casandra Washington MD,  Ely Burks DO, Dorie Anderson MD, Marcos Travis MD, Trey Luis DO, Delia Clemens MD,  Jorge Major DO, Gisel Herbert MD, Rose Yi MD, Celia Guillen MD, Matthew Hyatt MD,  Sammy Galvez MD, Mary Kay Karimi MD, Edson Hay MD, Mateo Turner MD, Stan Cline MD, Radha Hoskins MD, Mikael Cm DO, Eran Mckenzie MD, Ely Blackman MD, Mohsin Reza, MD, Shirley Waterhouse, CNP,  Missy Chowdhury CNP, Mikael Cai, CNP,  Shantelle Monson, DNP, Betty Boles, CNP, Yany Garcia, CNP, Cathie Law, CNP, Lynda Russell, CNP, An King, PA-C, Zoraida Sherman PA-C, Gabby Vaughn, CNP, Saray Patton, CNP,  Lacey Wilkinson, CNP, Nidhi Sinha, CNP, Jaclyn Neff, CNP,  Patsy Peterson, CNS, Zulay Ramsay, CNP, Martha Prince, CNP,   Mariela Benedict, CNP         [facility]   IN-PATIENT SERVICE   [facility]    Progress Note    1/27/2025    8:16 AM    Name:   Prateek Denton II  MRN:     [id number]     Acct:      [id number]   Room:   2011/2011-02  IP Day:  6  Admit Date:  1/21/2025  6:27 PM    PCP:   Lisseth Coello, TORREY - CNP  Code Status:  Full Code    Subjective:     C/C:   Chief Complaint   Patient presents with    Leg Swelling    Knee Pain    Leg Pain    Cough     Interval History Status: improved.     Feels better  Less pain    Denies cp/sob/n/v    Brief History:     Per my partner:  \"This is a 65-year-old male with longstanding diabetes that presents with a 2-week history of right heel ulcer as well as great toe ulcer. Wounds have been worsening with increasing pain and subsequently presented emergency room last evening. He has been for diabetic foot infection with ulceration. Podiatry and infectious  BACTEROIDES FRAGILIS MODERATE GROWTH Identification by MALDI-TOF BETA LACTAMASE POSITIVE       Radiology:  US RETROPERITONEAL COMPLETE    Result Date: 1/23/2025  1. No right-sided hydronephrosis.  Prior nephrectomy. 2. Lower pole right renal simple cyst measuring 1.6 cm.     MRI ANKLE RIGHT WO CONTRAST    Result Date: 1/22/2025  1. Extensive osteomyelitis of the posterior calcaneus. 2. Full-thickness tear of the Achilles tendon     MRI FOOT RIGHT WO CONTRAST    Result Date: 1/22/2025  Osteomyelitis of the distal aspect of the distal phalanx of the great toe.     XR FOOT RIGHT (MIN 3 VIEWS)    Result Date: 1/21/2025  1.  Acute osteomyelitis of the distal 1st phalanx . 2.  Age-indeterminate deformity of the distal 2nd phalanx.       Physical Examination:        General appearance:  alert, cooperative and no distress  Mental Status:  oriented to person, place and time and normal affect  Lungs:  clear to auscultation bilaterally, normal effort  Heart:  regular rate and rhythm, no murmur  Abdomen:  soft, nontender, nondistended, normal bowel sounds, no masses, hepatomegaly, splenomegaly  Extremities:  no edema, redness, tenderness in the calves  Skin:  right foot bandaged    Assessment:        Hospital Problems             Last Modified POA    * (Principal) Toe osteomyelitis, right 1/21/2025 Yes    Essential hypertension 1/22/2025 Yes    CKD (chronic kidney disease) stage 4, GFR 15-29 ml/min ([facility]) 1/22/2025 Yes    Sleep apnea 1/22/2025 Yes    Type 2 diabetes mellitus with diabetic nephropathy, with long-term current use of insulin ([facility]) 1/22/2025 Yes    Hyperkalemia 1/22/2025 Yes    Elevated serum creatinine 1/22/2025 Yes    Paroxysmal A-fib ([facility]) 1/22/2025 Yes    Secondary hypercoagulable state ([facility]) 1/22/2025 Yes    Diabetic ulcer of right heel associated with diabetes mellitus due to underlying condition, limited to breakdown of skin ([facility]) 1/23/2025 Yes       Plan:        K improved, ok to schedule OR; awaiting

## 2025-01-28 LAB
ANION GAP SERPL CALCULATED.3IONS-SCNC: 11 MMOL/L (ref 9–16)
BUN SERPL-MCNC: 43 MG/DL (ref 8–23)
CALCIUM SERPL-MCNC: 9.2 MG/DL (ref 8.8–10.2)
CHLORIDE SERPL-SCNC: 105 MMOL/L (ref 98–107)
CO2 SERPL-SCNC: 23 MMOL/L (ref 20–31)
CREAT SERPL-MCNC: 3.5 MG/DL (ref 0.7–1.2)
GFR, ESTIMATED: 19 ML/MIN/1.73M2
GLUCOSE BLD-MCNC: 102 MG/DL (ref 75–110)
GLUCOSE BLD-MCNC: 139 MG/DL (ref 75–110)
GLUCOSE BLD-MCNC: 155 MG/DL (ref 75–110)
GLUCOSE BLD-MCNC: 160 MG/DL (ref 75–110)
GLUCOSE SERPL-MCNC: 103 MG/DL (ref 82–115)
POTASSIUM SERPL-SCNC: 5.1 MMOL/L (ref 3.7–5.3)
SODIUM SERPL-SCNC: 138 MMOL/L (ref 136–145)

## 2025-01-28 PROCEDURE — 76937 US GUIDE VASCULAR ACCESS: CPT

## 2025-01-28 PROCEDURE — 1200000000 HC SEMI PRIVATE

## 2025-01-28 PROCEDURE — 2580000003 HC RX 258: Performed by: INTERNAL MEDICINE

## 2025-01-28 PROCEDURE — 2580000003 HC RX 258: Performed by: NURSE PRACTITIONER

## 2025-01-28 PROCEDURE — 82947 ASSAY GLUCOSE BLOOD QUANT: CPT

## 2025-01-28 PROCEDURE — 6370000000 HC RX 637 (ALT 250 FOR IP): Performed by: EMERGENCY MEDICINE

## 2025-01-28 PROCEDURE — C1751 CATH, INF, PER/CENT/MIDLINE: HCPCS

## 2025-01-28 PROCEDURE — 6370000000 HC RX 637 (ALT 250 FOR IP)

## 2025-01-28 PROCEDURE — 99232 SBSQ HOSP IP/OBS MODERATE 35: CPT | Performed by: INTERNAL MEDICINE

## 2025-01-28 PROCEDURE — 02HV33Z INSERTION OF INFUSION DEVICE INTO SUPERIOR VENA CAVA, PERCUTANEOUS APPROACH: ICD-10-PCS | Performed by: INTERNAL MEDICINE

## 2025-01-28 PROCEDURE — 6360000002 HC RX W HCPCS

## 2025-01-28 PROCEDURE — 6360000002 HC RX W HCPCS: Performed by: NURSE PRACTITIONER

## 2025-01-28 PROCEDURE — 2580000003 HC RX 258

## 2025-01-28 PROCEDURE — 36569 INSJ PICC 5 YR+ W/O IMAGING: CPT

## 2025-01-28 PROCEDURE — 80048 BASIC METABOLIC PNL TOTAL CA: CPT

## 2025-01-28 PROCEDURE — 36415 COLL VENOUS BLD VENIPUNCTURE: CPT

## 2025-01-28 PROCEDURE — 6370000000 HC RX 637 (ALT 250 FOR IP): Performed by: NURSE PRACTITIONER

## 2025-01-28 PROCEDURE — 6360000002 HC RX W HCPCS: Performed by: INTERNAL MEDICINE

## 2025-01-28 PROCEDURE — 2500000003 HC RX 250 WO HCPCS

## 2025-01-28 RX ORDER — LIDOCAINE HYDROCHLORIDE 10 MG/ML
50 INJECTION, SOLUTION EPIDURAL; INFILTRATION; INTRACAUDAL; PERINEURAL ONCE
Status: DISCONTINUED | OUTPATIENT
Start: 2025-01-28 | End: 2025-01-31 | Stop reason: HOSPADM

## 2025-01-28 RX ORDER — SODIUM CHLORIDE 0.9 % (FLUSH) 0.9 %
5-40 SYRINGE (ML) INJECTION EVERY 12 HOURS SCHEDULED
Status: DISCONTINUED | OUTPATIENT
Start: 2025-01-28 | End: 2025-01-31 | Stop reason: HOSPADM

## 2025-01-28 RX ORDER — SODIUM CHLORIDE 9 MG/ML
INJECTION, SOLUTION INTRAVENOUS PRN
Status: DISCONTINUED | OUTPATIENT
Start: 2025-01-28 | End: 2025-01-31 | Stop reason: HOSPADM

## 2025-01-28 RX ORDER — SODIUM CHLORIDE 0.9 % (FLUSH) 0.9 %
5-40 SYRINGE (ML) INJECTION PRN
Status: DISCONTINUED | OUTPATIENT
Start: 2025-01-28 | End: 2025-01-31 | Stop reason: HOSPADM

## 2025-01-28 RX ADMIN — HEPARIN SODIUM 5000 UNITS: 5000 INJECTION INTRAVENOUS; SUBCUTANEOUS at 05:45

## 2025-01-28 RX ADMIN — Medication 1000 UNITS: at 21:25

## 2025-01-28 RX ADMIN — HYDROXYZINE HYDROCHLORIDE 50 MG: 25 TABLET ORAL at 08:46

## 2025-01-28 RX ADMIN — SODIUM BICARBONATE 650 MG: 650 TABLET ORAL at 21:25

## 2025-01-28 RX ADMIN — METOPROLOL 100 MG: 100 TABLET ORAL at 08:45

## 2025-01-28 RX ADMIN — OXYCODONE HYDROCHLORIDE 10 MG: 5 TABLET ORAL at 21:26

## 2025-01-28 RX ADMIN — SODIUM ZIRCONIUM CYCLOSILICATE 10 G: 10 POWDER, FOR SUSPENSION ORAL at 21:25

## 2025-01-28 RX ADMIN — SODIUM BICARBONATE 650 MG: 650 TABLET ORAL at 14:06

## 2025-01-28 RX ADMIN — CEFEPIME 1000 MG: 1 INJECTION, POWDER, FOR SOLUTION INTRAMUSCULAR; INTRAVENOUS at 08:56

## 2025-01-28 RX ADMIN — DAPTOMYCIN 600 MG: 500 INJECTION, POWDER, LYOPHILIZED, FOR SOLUTION INTRAVENOUS at 22:09

## 2025-01-28 RX ADMIN — SODIUM BICARBONATE 650 MG: 650 TABLET ORAL at 08:45

## 2025-01-28 RX ADMIN — OXYCODONE HYDROCHLORIDE 10 MG: 5 TABLET ORAL at 15:46

## 2025-01-28 RX ADMIN — METOPROLOL 100 MG: 100 TABLET ORAL at 21:25

## 2025-01-28 RX ADMIN — DILTIAZEM HYDROCHLORIDE 60 MG: 60 TABLET ORAL at 08:45

## 2025-01-28 RX ADMIN — DULOXETINE 30 MG: 30 CAPSULE, DELAYED RELEASE ORAL at 08:45

## 2025-01-28 RX ADMIN — HEPARIN SODIUM 5000 UNITS: 5000 INJECTION INTRAVENOUS; SUBCUTANEOUS at 21:25

## 2025-01-28 RX ADMIN — DILTIAZEM HYDROCHLORIDE 60 MG: 60 TABLET ORAL at 21:25

## 2025-01-28 RX ADMIN — TRAZODONE HYDROCHLORIDE 50 MG: 50 TABLET ORAL at 21:26

## 2025-01-28 RX ADMIN — SODIUM ZIRCONIUM CYCLOSILICATE 10 G: 10 POWDER, FOR SUSPENSION ORAL at 08:45

## 2025-01-28 RX ADMIN — CEFEPIME 1000 MG: 1 INJECTION, POWDER, FOR SOLUTION INTRAMUSCULAR; INTRAVENOUS at 21:33

## 2025-01-28 RX ADMIN — HEPARIN SODIUM 5000 UNITS: 5000 INJECTION INTRAVENOUS; SUBCUTANEOUS at 14:06

## 2025-01-28 RX ADMIN — SODIUM CHLORIDE, PRESERVATIVE FREE 10 ML: 5 INJECTION INTRAVENOUS at 08:58

## 2025-01-28 RX ADMIN — HYDROXYZINE HYDROCHLORIDE 50 MG: 25 TABLET ORAL at 21:25

## 2025-01-28 ASSESSMENT — PAIN DESCRIPTION - ORIENTATION: ORIENTATION: RIGHT

## 2025-01-28 ASSESSMENT — PAIN SCALES - GENERAL
PAINLEVEL_OUTOF10: 5
PAINLEVEL_OUTOF10: 5
PAINLEVEL_OUTOF10: 8
PAINLEVEL_OUTOF10: 0
PAINLEVEL_OUTOF10: 3
PAINLEVEL_OUTOF10: 7

## 2025-01-28 ASSESSMENT — PAIN DESCRIPTION - DESCRIPTORS: DESCRIPTORS: ACHING;DISCOMFORT;CRAMPING

## 2025-01-28 ASSESSMENT — PAIN DESCRIPTION - LOCATION: LOCATION: FOOT

## 2025-01-28 ASSESSMENT — PAIN SCALES - WONG BAKER: WONGBAKER_NUMERICALRESPONSE: NO HURT

## 2025-01-28 ASSESSMENT — PAIN - FUNCTIONAL ASSESSMENT: PAIN_FUNCTIONAL_ASSESSMENT: PREVENTS OR INTERFERES SOME ACTIVE ACTIVITIES AND ADLS

## 2025-01-28 NOTE — PROGRESS NOTES
Occupational Therapy  [facility]  Occupational Therapy Not Seen Note    Patient not available for Occupational Therapy due to:    [] Testing:    [] Hemodialysis    [] Cancelled by RN:    []Refusal by Patient:    [] Surgery:     [] Intubation:     [] Pain Medication:    [] Sedation:     [] Spine Precautions :    [] Medical Instability:    [x] Other:1/28 attempted to see pt for tx however RN states he is in the process of getting new PICC line

## 2025-01-28 NOTE — PROGRESS NOTES
Comprehensive Nutrition Assessment    Type and Reason for Visit:  Reassess    Nutrition Recommendations/Plan:   Will send Nepro TID, monitor renal labs  Encourage good po intakes  Consider low potassium diet  Follow up after surgery to see how patient is tolerating diet.   RD to follow/monitor.     Malnutrition Assessment:  Malnutrition Status:  At risk for malnutrition (01/23/25 1331)        Nutrition Assessment:    Patient continues for medical management of toe osteomyelitis, surgery was delayed due to hyperkalemia. Patient noted to have CKD4 (originally noted as 3). Patient on CHO3 diet with high protein. This RD asked if patient would like more carbs allotted but he denied at this time. Patient and wife state nephrologist told them they need lots of protein, this RD informed them that usually with CKD4, protein should be in moderation, although it is challenging because with his osteomyelitis, he would benefit from protein intake as well. His K+ is 5.1 at this time, phos wnl. Wife was unsure which protein drink is best for him, Premier vs Nepro vs Ensure. This RD recommends Nepro in light of CKD4. Patient does have a good appetite and reports eating adequate amounts. Labs, meds, PMH reviewed.    Nutrition Related Findings:    LBM 1/27, active bs, trace RLE edema. K+ 5.1 Eating >75% Wound Type: Open Wounds       Current Nutrition Intake & Therapies:    Average Meal Intake: NPO  Average Supplements Intake: NPO  ADULT DIET; Regular; 3 carb choices (45 gm/meal)    Anthropometric Measures:  Height: 180.3 cm (5' 11\")  Ideal Body Weight (IBW): 172 lbs (78 kg)       Current Body Weight: 122.5 kg (270 lb),   IBW.    Current BMI (kg/m2): 37.7             Estimated Daily Nutrient Needs:  Energy Requirements Based On: Kcal/kg  Weight Used for Energy Requirements: Current  Energy (kcal/day): 2754-4722 (15-17)  Weight Used for Protein Requirements: Current  Protein (g/day): 125-150 (1-1.2)  Method Used for Fluid

## 2025-01-28 NOTE — PLAN OF CARE
Problem: Safety - Adult  Goal: Free from fall injury  1/28/2025 1124 by Warner Roland RN  Outcome: Progressing   Patient is a fall risk during this admission. Fall risk assessment was performed. Patient is absent of falls. Bed is in the lowest position. Wheels on the bed are locked. Call light and bed side table are within reach. Clutter is removed. Patient was educated to call out when needing assistance or wanting to get out of bed. Patient offered toileting assistance during rounding. Hourly rounds have been performed.

## 2025-01-28 NOTE — PLAN OF CARE
The plan is for the patient to undergo surgery 1/30/2025  The patient will likely need IV antibiotic therapy at the time of discharge  The tentative plan is that the patient will remain hospitalized till surgery    Electronically signed by Dixon Freitas MD on 1/28/2025 at 11:30 AM

## 2025-01-28 NOTE — PLAN OF CARE
Problem: Skin/Tissue Integrity - Adult  Goal: Skin integrity remains intact  Outcome: Progressing  Flowsheets  Taken 1/28/2025 0336  Skin Integrity Remains Intact: Monitor for areas of redness and/or skin breakdown  Taken 1/27/2025 2013  Skin Integrity Remains Intact: Monitor for areas of redness and/or skin breakdown     Problem: Skin/Tissue Integrity - Adult  Goal: Incisions, wounds, or drain sites healing without S/S of infection  Outcome: Progressing  Flowsheets (Taken 1/28/2025 0336)  Incisions, Wounds, or Drain Sites Healing Without Sign and Symptoms of Infection: TWICE DAILY: Assess and document dressing/incision, wound bed, drain sites and surrounding tissue     Problem: Metabolic/Fluid and Electrolytes - Adult  Goal: Electrolytes maintained within normal limits  Outcome: Progressing  Flowsheets (Taken 1/28/2025 0336)  Electrolytes maintained within normal limits: Monitor labs and assess patient for signs and symptoms of electrolyte imbalances     Problem: Neurosensory - Adult  Goal: Achieves stable or improved neurological status  Outcome: Progressing  Flowsheets (Taken 1/28/2025 0336)  Achieves stable or improved neurological status: Assess for and report changes in neurological status     Problem: Musculoskeletal - Adult  Goal: Return mobility to safest level of function  Outcome: Progressing  Flowsheets (Taken 1/28/2025 0336)  Return Mobility to Safest Level of Function:   Assess patient stability and activity tolerance for standing, transferring and ambulating with or without assistive devices   Assist with transfers and ambulation using safe patient handling equipment as needed   Ensure adequate protection for wounds/incisions during mobilization     Problem: Hematologic - Adult  Goal: Maintains hematologic stability  Outcome: Progressing  Flowsheets (Taken 1/28/2025 0336)  Maintains hematologic stability: Assess for signs and symptoms of bleeding or hemorrhage

## 2025-01-28 NOTE — PROGRESS NOTES
[facility]    Shaukat Rashid MD Zafar Magsi MD Danial Rashid MD John O. Ranker MD Sembria Ligibel, BRIT       Nephrology Consultation Note    Reason for Consult:    Date of Service: 01/28/25   PCP: Lisseth Coello, TORREY - CNP       Reason for Consult     Acute kidney injury  Hyperkalemia    Assessment and Plan     65 years old male patient with a past medical history significant for hypertension hyperlipidemia diabetes diabetic nephropathy status post left nephrectomy, presented with a chief complaints of right toe osteomyelitis.,  Apparently 2 weeks ago he noticed an ulcer on the right heel and bottom of the right great toe, despite local wound care, wound became worse, x-ray of the right foot shows osteomyelitis, patient was admitted to hospital medicine, podiatry consulted.  Nephrology was consulted for acute kidney injury and hyperkalemia.    Assessment  Acute on chronic kidney disease, baseline creatinine around 3 -3.5, GFR 20-24.   Most likely prerenal component and possible early ATN, renal ultrasound negative for obstruction  History of left nephrectomy  Hyperkalemia  Metabolic acidosis  Volume: Appears euvolemic  Hypertension: Stable  Hemodynamics: echo showing normal ejection fraction  Anemia of CKD  CKD MBD  Osteomyelitis of distal first phalanx  Diabetes mellitus type 2, last hemoglobin A1c 6.2 from October 2024  Atrial fibrillation  Hyperlipidemia  Obstructive sleep apnea  Anxiety depression.    Plan  Renal function stable, potassium is normal range  Continue Lokelma twice daily  Continue sodium bicarbonate by mouth 650 mg 3 times a day  Avoid nephrotoxins, hemodynamic instability and hypotension  Avoid contrast studies unless absolutely necessary  Dose all medication for GFR    Thank you for this consultation, we will follow along with you regarding care of this patient while the patient is here in the hospital, please call if you have any questions or  Studies:  Urine Sodium:  No components found for: \"TRISH\"  Urine Potassium:    Lab Results   Component Value Date/Time    KUR 42.6 01/22/2025 02:57 PM     Urine Chloride:    Lab Results   Component Value Date/Time    CLUR 98 01/22/2025 02:57 PM     Urine Osmolarity:    Lab Results   Component Value Date/Time    OSMOU 526 10/10/2024 06:35 PM     Urine Creatinine:  No results found for: \"LABCREA\"  Urine Eosinophils: No components found for: \"UEOS\"  Urine Protein:  No results found for: \"TPU\"      Urine:    Lab Results   Component Value Date/Time    PROTEINU 3+ 01/22/2025 02:57 PM         Serology:  FARIBA:   Lab Results   Component Value Date/Time    FARIBA NEGATIVE 09/28/2020 04:53 AM     C3:  Lab Results   Component Value Date/Time    C3 102 09/28/2020 04:53 AM     C4:  Lab Results   Component Value Date/Time    C4 20 09/28/2020 04:53 AM     MPO ANCA:  No results found for: \"MPO\"  PR3 ANCA:  No results found for: \"PR3\"  hepatitis serologies  ANTIGBM:No results found for: \"GBMABIGG\"  HEPATITIS B SURFACE AG: No results found for: \"HEPBSAG\"  HEPATITIS C AB:   Lab Results   Component Value Date/Time    HEPCAB NONREACTIVE 11/10/2020 09:15 AM       Electrophoresis:  SPEP:No results found for: \"ALBCAL\", \"ALBPCT\", \"LABALPH\", \"A1PCT\", \"A2PCT\", \"LABBETA\", \"BETAPCT\", \"GAMGLOB\", \"GGPCT\", \"PATH\"  UPEP:No results found for: \"LABPE\"         Thank you for the consultation. Please do not hesitate to contact us for any further questions/concerns. We will continue to follow along with you.        Electronically signed by Cesar Duffy MD  on 1/28/2025 at 6:13 PM

## 2025-01-28 NOTE — PROGRESS NOTES
[facility]  Office: [phone]  Aung Luis DO, Mansoor Cline DO, Josse Han DO, Gregory Archibald DO, Andrew Roberts MD, Mayra Medel MD, Ray Johnson MD, Romy Amador MD,  Mic Meraz MD, Rom Hayes MD, Casandra Washington MD,  Ely Burks DO, Dorie Anderson MD, Marcos Travis MD, Trey Luis DO, Delia Clemens MD,  Jorge Major DO, Gisel Herbert MD, Rose Yi MD, Celia Giullen MD, Matthew Hyatt MD,  Sammy Galvez MD, Mary Kay Karimi MD, Edson Hay MD, Mateo Turner MD, Stan Cline MD, Radha Hoskins MD, Mikael Cm DO, Eran Mckenzie MD, Ely Blackman MD, Mohsin Reza, MD, Shirley Waterhouse, CNP,  Missy Chowdhury CNP, Mikael Cai, CNP,  Shantelle Monson, DNP, Betty Boles, CNP, Yany Garcia, CNP, Cathie Law, CNP, Lynda Russell, CNP, An King, PA-C, Zoraida Sherman PA-C, Gabby Vaughn, CNP, Saray Patton, CNP,  Lacey Wilkinson, CNP, Nidhi Sinha, CNP, Jaclyn Neff, CNP,  Patsy Peterson, CNS, Zulay Ramsay, CNP, Martha Prinec, CNP,   Mariela Benedict, CNP         [facility]   IN-PATIENT SERVICE   [facility]    Progress Note    1/28/2025    11:15 AM    Name:   Prateek Denton II  MRN:     [id number]     Acct:      [id number]   Room:   2011/2011-02  IP Day:  7  Admit Date:  1/21/2025  6:27 PM    PCP:   Lisseth Coello, TORREY - CNP  Code Status:  Full Code    Subjective:     C/C:   Chief Complaint   Patient presents with    Leg Swelling    Knee Pain    Leg Pain    Cough     Interval History Status: improved.     Patient is resting in bed and denies any acute complaints of chest pain, shortness of breath, nausea or vomiting, fevers chills or acute complaints    Brief History:     This is a 65-year-old male with longstanding diabetes that presents with a 2-week history of right heel ulcer as well as great toe ulcer. Wounds have been worsening with increasing pain and subsequently presented emergency room last evening.  right-sided hydronephrosis.  Prior nephrectomy. 2. Lower pole right renal simple cyst measuring 1.6 cm.     MRI ANKLE RIGHT WO CONTRAST    Result Date: 1/22/2025  1. Extensive osteomyelitis of the posterior calcaneus. 2. Full-thickness tear of the Achilles tendon     MRI FOOT RIGHT WO CONTRAST    Result Date: 1/22/2025  Osteomyelitis of the distal aspect of the distal phalanx of the great toe.     XR FOOT RIGHT (MIN 3 VIEWS)    Result Date: 1/21/2025  1.  Acute osteomyelitis of the distal 1st phalanx . 2.  Age-indeterminate deformity of the distal 2nd phalanx.       Physical Examination:        General appearance:  alert, cooperative and no distress  Mental Status:  oriented to person, place and time and normal affect  Lungs:  clear to auscultation bilaterally, normal effort  Heart:  regular rate and rhythm, no murmur  Abdomen:  soft, nontender, nondistended, normal bowel sounds, no masses, hepatomegaly, splenomegaly  Extremities:  no edema, redness, tenderness in the calves  Skin:  no gross lesions, rashes, induration    Assessment:        Hospital Problems             Last Modified POA    * (Principal) Toe osteomyelitis, right 1/21/2025 Yes    Essential hypertension 1/22/2025 Yes    CKD (chronic kidney disease) stage 4, GFR 15-29 ml/min ([facility]) 1/22/2025 Yes    Sleep apnea 1/22/2025 Yes    Type 2 diabetes mellitus with diabetic nephropathy, with long-term current use of insulin ([facility]) 1/22/2025 Yes    Hyperkalemia 1/22/2025 Yes    Elevated serum creatinine 1/22/2025 Yes    Paroxysmal A-fib ([facility]) 1/22/2025 Yes    Secondary hypercoagulable state ([facility]) 1/22/2025 Yes    Diabetic ulcer of right heel associated with diabetes mellitus due to underlying condition, limited to breakdown of skin ([facility]) 1/23/2025 Yes       Plan:        Continue local wound care  Antibiotics as ordered per ID  Trend glucose, insulin scale  Monitor labs, correct electrolytes as needed  Home CPAP  Antihypertensives as ordered  PT and OT as

## 2025-01-28 NOTE — PROGRESS NOTES
Progress Note  Foot and Ankle Surgery    CC/Reason for consult:   Osteomyelitis, 1st digit  Osteomyelitis, direct extension, calcaneus right  Achilles tendon rupture, right leg  Abscess, achilles tendon, right leg      Interval history:  - Patient seen and examined at bedside during rounding   - No acute complaints. Vital signs stable  - Patients potassium today was 5.1  - Patient is scheduled for surgery on 1/30/25. May be discharged from our standpoint and follow up outpatient     Updated Labs:     WBC:   Lab Results   Component Value Date    WBC 8.4 01/27/2025     ESR:  Lab Results   Component Value Date    SEDRATE 90 (H) 01/26/2025     CRP:  Lab Results   Component Value Date    CRP 38.6 (H) 01/26/2025       HPI:   Prateek Denton II is a 65 y.o. male diabetic patient seen at [facility] for foot wounds to the right lower extremity.  The patient presented to the ED on 1/21/2025 with a chief complaint of leg swelling, knee pain, leg pain and cough.  He was then admitted to medicine for management of osteomyelitis of the right hallux.  Patient states that approximately 2 weeks ago he noticed an ulcer on his right heel and odor noticed a wound to the right hallux about a month ago.  Patient states he has previously seen Dr. Del Valle for routine foot care.  Patient has a history of diabetic foot ulcers and was previously treating them at home with Medihoney.  Patient states the wounds progressively became worse and have recently become malodorous.  Patient reports local pain that he rates a 6 out of 10.  He describes the pain as constant and dull.  Patient denies any recent injury or trauma.  Patient denies constitutional symptoms at this time but states a few weeks ago he was experiencing nausea and chills.  Patient's labs in the ED were significant for CRP of 56.7, ESR 46 and WBC of 11.4.  Patient was afebrile upon presentation.  Patient has a past medical history significant for CKD with a unilateral kidney as

## 2025-01-28 NOTE — CARE COORDINATION
Transitional Planning    Surgery scheduled 1/30/25 w podiatry. Pt cont on Dapto/Cefepime. ID recs pending. Pt is from home w wife, had IV ABX years ago. Pt has a walker and sc. [facility] following.   PS sent to Dr. Han.  Read [id number].

## 2025-01-28 NOTE — PROCEDURES
PROCEDURE NOTE  Date: 1/28/2025   Name: Prateek Denton II  YOB: 1960    Procedures    Picc placement order:    Benefits include stable, long term intravenous access. Blood draws. Peripheral vein preservation. Safely deliver vesicant medications.    Risks include failure to obtain the desired result(s) of the procedure, discomfort, injury, the need for additional procedures/therapies, permanent loss of body function, bleeding/bruising, arterial puncture, air embolism, nerve damage, hematoma, phlebitis, catheter fracture/rupture, catheter embolism, catheter occlusion, catheter migration, catheter site infection, unintentional/accidental removal of catheter, bloodstream infection, infiltration, cardiac arrhythmia, vein thrombosis, difficult catheter removal.   Alternatives discussed including centrally inserted central catheter, as well as less invasive procedures such as multiple peripheral IVs, extended dwell catheters and midline placements.     Consent obtained by proceduralist, signed by Patient.      Prescribed therapy: Long term ATBX  Peripheral ultrasound assessment done. Plan for  right basilic vein insertion.     Product type: Antimicrobial/Antithrombogenic Arrow non tapered power injectable PICC  History/Labs/Allergies Reviewed  Placed By: Aiyana - CARMELO IV Team  Assistant - Staff-RN  Time out Performed using Two Identifiers  Catheter info: 4.5 Korean -  single lumen  Total length: 43 cm  External catheter length: 3 cm  Extremity circumference at site: 35  cm  Number of attempts: 1  Estimated blood loss: 1 ml  Placement verified by: positive blood return & flushes easily  Special equipment used: [facility] ECG Tip tracker system, ultrasound, MST, and micro-introducer needle.   Catheter securement: Adhesive securement device  Catheter adhesive (SecureportIV) utilized at insertion site  Dressing applied: Tegaderm CHG  Lidocaine administered intradermally conc.1%: Approx 2 mL     RN aware picc placed with

## 2025-01-29 LAB
ANION GAP SERPL CALCULATED.3IONS-SCNC: 11 MMOL/L (ref 9–16)
BUN SERPL-MCNC: 42 MG/DL (ref 8–23)
CALCIUM SERPL-MCNC: 9.2 MG/DL (ref 8.8–10.2)
CHLORIDE SERPL-SCNC: 104 MMOL/L (ref 98–107)
CK SERPL-CCNC: 91 U/L (ref 39–308)
CO2 SERPL-SCNC: 23 MMOL/L (ref 20–31)
CREAT SERPL-MCNC: 3.6 MG/DL (ref 0.7–1.2)
GFR, ESTIMATED: 18 ML/MIN/1.73M2
GLUCOSE BLD-MCNC: 157 MG/DL (ref 75–110)
GLUCOSE BLD-MCNC: 176 MG/DL (ref 75–110)
GLUCOSE BLD-MCNC: 181 MG/DL (ref 75–110)
GLUCOSE BLD-MCNC: 88 MG/DL (ref 75–110)
GLUCOSE SERPL-MCNC: 100 MG/DL (ref 82–115)
POTASSIUM SERPL-SCNC: 4.6 MMOL/L (ref 3.7–5.3)
SODIUM SERPL-SCNC: 138 MMOL/L (ref 136–145)

## 2025-01-29 PROCEDURE — 6360000002 HC RX W HCPCS: Performed by: INTERNAL MEDICINE

## 2025-01-29 PROCEDURE — 97168 OT RE-EVAL EST PLAN CARE: CPT

## 2025-01-29 PROCEDURE — 80048 BASIC METABOLIC PNL TOTAL CA: CPT

## 2025-01-29 PROCEDURE — 97110 THERAPEUTIC EXERCISES: CPT

## 2025-01-29 PROCEDURE — 6360000002 HC RX W HCPCS

## 2025-01-29 PROCEDURE — 36415 COLL VENOUS BLD VENIPUNCTURE: CPT

## 2025-01-29 PROCEDURE — 97164 PT RE-EVAL EST PLAN CARE: CPT

## 2025-01-29 PROCEDURE — 6370000000 HC RX 637 (ALT 250 FOR IP)

## 2025-01-29 PROCEDURE — 6370000000 HC RX 637 (ALT 250 FOR IP): Performed by: EMERGENCY MEDICINE

## 2025-01-29 PROCEDURE — 99232 SBSQ HOSP IP/OBS MODERATE 35: CPT | Performed by: INTERNAL MEDICINE

## 2025-01-29 PROCEDURE — 2500000003 HC RX 250 WO HCPCS: Performed by: INTERNAL MEDICINE

## 2025-01-29 PROCEDURE — 82550 ASSAY OF CK (CPK): CPT

## 2025-01-29 PROCEDURE — 82947 ASSAY GLUCOSE BLOOD QUANT: CPT

## 2025-01-29 PROCEDURE — 1200000000 HC SEMI PRIVATE

## 2025-01-29 PROCEDURE — 2580000003 HC RX 258: Performed by: INTERNAL MEDICINE

## 2025-01-29 PROCEDURE — 6370000000 HC RX 637 (ALT 250 FOR IP): Performed by: NURSE PRACTITIONER

## 2025-01-29 RX ORDER — DEXTROSE MONOHYDRATE AND SODIUM CHLORIDE 5; .9 G/100ML; G/100ML
INJECTION, SOLUTION INTRAVENOUS CONTINUOUS
Status: DISCONTINUED | OUTPATIENT
Start: 2025-01-29 | End: 2025-01-30

## 2025-01-29 RX ADMIN — TRAZODONE HYDROCHLORIDE 50 MG: 50 TABLET ORAL at 21:47

## 2025-01-29 RX ADMIN — HEPARIN SODIUM 5000 UNITS: 5000 INJECTION INTRAVENOUS; SUBCUTANEOUS at 06:00

## 2025-01-29 RX ADMIN — SODIUM BICARBONATE 650 MG: 650 TABLET ORAL at 09:31

## 2025-01-29 RX ADMIN — SODIUM BICARBONATE 650 MG: 650 TABLET ORAL at 21:47

## 2025-01-29 RX ADMIN — DILTIAZEM HYDROCHLORIDE 60 MG: 60 TABLET ORAL at 21:46

## 2025-01-29 RX ADMIN — OXYCODONE HYDROCHLORIDE 10 MG: 5 TABLET ORAL at 21:47

## 2025-01-29 RX ADMIN — SODIUM BICARBONATE 650 MG: 650 TABLET ORAL at 13:04

## 2025-01-29 RX ADMIN — SODIUM ZIRCONIUM CYCLOSILICATE 10 G: 10 POWDER, FOR SUSPENSION ORAL at 21:47

## 2025-01-29 RX ADMIN — CEFEPIME 1000 MG: 1 INJECTION, POWDER, FOR SOLUTION INTRAMUSCULAR; INTRAVENOUS at 09:42

## 2025-01-29 RX ADMIN — SODIUM ZIRCONIUM CYCLOSILICATE 10 G: 10 POWDER, FOR SUSPENSION ORAL at 09:31

## 2025-01-29 RX ADMIN — Medication 1000 UNITS: at 21:46

## 2025-01-29 RX ADMIN — DULOXETINE 30 MG: 30 CAPSULE, DELAYED RELEASE ORAL at 09:31

## 2025-01-29 RX ADMIN — DILTIAZEM HYDROCHLORIDE 60 MG: 60 TABLET ORAL at 09:31

## 2025-01-29 RX ADMIN — HEPARIN SODIUM 5000 UNITS: 5000 INJECTION INTRAVENOUS; SUBCUTANEOUS at 21:47

## 2025-01-29 RX ADMIN — METOPROLOL 100 MG: 100 TABLET ORAL at 09:31

## 2025-01-29 RX ADMIN — SODIUM CHLORIDE, PRESERVATIVE FREE 10 ML: 5 INJECTION INTRAVENOUS at 09:32

## 2025-01-29 RX ADMIN — HYDROXYZINE HYDROCHLORIDE 50 MG: 25 TABLET ORAL at 21:47

## 2025-01-29 RX ADMIN — INSULIN LISPRO 2 UNITS: 100 INJECTION, SOLUTION INTRAVENOUS; SUBCUTANEOUS at 13:03

## 2025-01-29 RX ADMIN — HEPARIN SODIUM 5000 UNITS: 5000 INJECTION INTRAVENOUS; SUBCUTANEOUS at 13:04

## 2025-01-29 RX ADMIN — HYDROXYZINE HYDROCHLORIDE 50 MG: 25 TABLET ORAL at 09:31

## 2025-01-29 RX ADMIN — METOPROLOL 100 MG: 100 TABLET ORAL at 21:46

## 2025-01-29 RX ADMIN — CEFEPIME 1000 MG: 1 INJECTION, POWDER, FOR SOLUTION INTRAMUSCULAR; INTRAVENOUS at 22:00

## 2025-01-29 ASSESSMENT — PAIN DESCRIPTION - DESCRIPTORS
DESCRIPTORS: THROBBING
DESCRIPTORS: ACHING;DISCOMFORT;CRAMPING;BURNING

## 2025-01-29 ASSESSMENT — PAIN DESCRIPTION - ONSET
ONSET: ON-GOING

## 2025-01-29 ASSESSMENT — PAIN SCALES - GENERAL
PAINLEVEL_OUTOF10: 2
PAINLEVEL_OUTOF10: 4
PAINLEVEL_OUTOF10: 3
PAINLEVEL_OUTOF10: 3
PAINLEVEL_OUTOF10: 2
PAINLEVEL_OUTOF10: 3
PAINLEVEL_OUTOF10: 8

## 2025-01-29 ASSESSMENT — PAIN - FUNCTIONAL ASSESSMENT
PAIN_FUNCTIONAL_ASSESSMENT: PREVENTS OR INTERFERES SOME ACTIVE ACTIVITIES AND ADLS

## 2025-01-29 ASSESSMENT — PAIN DESCRIPTION - FREQUENCY
FREQUENCY: CONTINUOUS

## 2025-01-29 ASSESSMENT — PAIN DESCRIPTION - ORIENTATION
ORIENTATION: RIGHT

## 2025-01-29 ASSESSMENT — PAIN DESCRIPTION - PAIN TYPE
TYPE: ACUTE PAIN

## 2025-01-29 ASSESSMENT — ENCOUNTER SYMPTOMS
RESPIRATORY NEGATIVE: 1
GASTROINTESTINAL NEGATIVE: 1

## 2025-01-29 ASSESSMENT — PAIN DESCRIPTION - LOCATION
LOCATION: FOOT

## 2025-01-29 NOTE — PROGRESS NOTES
[facility]  Office: [phone]  Aung Luis DO, Mansoor Cline DO, Josse Han DO, Gregory Archibald DO, Andrew Roberts MD, Mayra Medel MD, Ray Johnson MD, Romy Amador MD,  Mic Meraz MD, Rom Hayes MD, Casandra Washington MD,  Ely Burks DO, Dorie Anderson MD, Marcos Travis MD, Trey Luis DO, Delia Clemens MD,  Jorge Major DO, Gisel Herbert MD, Rose Yi MD, Celia Guillen MD, Matthew Hyatt MD,  Sammy Galvez MD, Mary Kay Karimi MD, Edson Hay MD, Mateo Turner MD, Stan Cline MD, Radha Hoskins MD, Mikael Cm DO, Eran Mckenzie MD, Ely Blackman MD, Mohsin Reza, MD, Shirley Waterhouse, CNP,  Missy Chowdhury CNP, Mikael Cai, CNP,  Shantelle Monson, DNP, Betty Boles, CNP, Yany Garcia, CNP, Cathie Law, CNP, Lynda Russell, CNP, An King, PA-C, Zoraida Sherman PA-C, Gabby Vaughn, CNP, Saray Patton, CNP,  Lacey Wilkinson, CNP, Nidhi Sinha, CNP, Jaclyn Neff, CNP,  Patsy Peterson, CNS, Zulay Ramsay CNP, Martha Prince, CNP,   Mariela Benedict, CNP         [facility]   IN-PATIENT SERVICE   [facility]    Progress Note    1/29/2025    7:53 AM    Name:   Prateek Denton II  MRN:     [id number]     Acct:      [id number]   Room:   2011/2011-02  IP Day:  8  Admit Date:  1/21/2025  6:27 PM    PCP:   Lisseth Coello, TORREY - CNP  Code Status:  Full Code    Subjective:     C/C:   Chief Complaint   Patient presents with    Leg Swelling    Knee Pain    Leg Pain    Cough     Interval History Status: improved.     Patient is seen up in bed eating breakfast.  Denies any complaints of chest pain, shortness of breath, nausea or vomiting, fevers or chills or acute complaints.  Scheduled for surgery tomorrow.  Scheduled for surgery tomorrow.    Brief History:     This is a 65-year-old male with longstanding diabetes that presents with a 2-week history of right heel ulcer as well as great toe ulcer. Wounds have been worsening

## 2025-01-29 NOTE — PROGRESS NOTES
Occupational Therapy  Occupational Therapy  Re-Evaluation  Facility/Department: [facility] MED SURG   Patient Name: Prateek Denton II        MRN: [id number]    : 1960    Date of Service: 2025    RN reports patient is medically stable for therapy treatment this date.    Chart reviewed prior to treatment and patient is agreeable for therapy.  All lines intact and patient positioned comfortably at end of treatment.  All patient needs addressed prior to ending therapy session.      Discharge Recommendations  Discharge Recommendations: Patient would benefit from continued therapy after discharge  Due to recent hospitalization and medical condition, pt would benefit from additional intermittent skilled therapy at time of discharge.  Please refer to the AM-PAC score for current functional status.     OT Equipment Recommendations  Equipment Needed: Yes  Mobility Devices: ADL Assistive Devices  ADL Assistive Devices: Emergency Alert System    Chief Complaint   Patient presents with    Leg Swelling    Knee Pain    Leg Pain    Cough     Past Medical History:  has a past medical history of Acute blood loss anemia, Arthritis, Back pain, Cellulitis, CKD (chronic kidney disease) stage 3, GFR 30-59 ml/min ([facility]), Colostomy RUQ, Frequent headaches, Gastrointestinal hemorrhage with melena, Gout, Hemorrhagic shock ([facility]), Hernia of abdominal wall, History of atrial fibrillation, History of blood transfusion, [place] (hard of hearing), HTN, Hyperkalemia, Hyperlipidemia, Incisional hernia, Kidney infection, Large bowel obstruction ([facility]), Morbid obesity due to excess calories, Paroxysmal A-fib ([facility]), SBO (small bowel obstruction) ([facility]), Sepsis ([facility]), Single kidney, Sleep apnea, T2DM, Tooth missing, Under care of team, Upper GI bleed, Wears glasses, and Wellness examination.  Past Surgical History:  has a past surgical history that includes Ankle surgery (Right, ); Carpal tunnel release (Bilateral); Cystoscopy (Right, 2020); hc  cath power picc triple (09/03/2020); Kidney surgery (Left, 09/02/2020); Small intestine surgery (N/A, 09/02/2020); laparotomy (N/A, 09/23/2020); Tonsillectomy; Knee arthroscopy (Bilateral); Adenoidectomy; cysto/uretero/pyeloscopy, calculus tx (Right, 10/30/2020); colostomy; Small intestine surgery (N/A, 12/18/2020); Tympanostomy tube placement; Upper gastrointestinal endoscopy (N/A, 01/05/2022); Colonoscopy (N/A, 04/18/2023); Upper gastrointestinal endoscopy (N/A, 06/21/2023); Upper gastrointestinal endoscopy (N/A, 06/22/2023); Upper gastrointestinal endoscopy (N/A, 06/23/2023); Upper gastrointestinal endoscopy (N/A, 08/09/2023); Upper gastrointestinal endoscopy (N/A, 03/14/2024); Incisional hernia repair (05/08/2024); and ventral hernia repair (N/A, 5/8/2024).    Assessment  Performance deficits / Impairments: Decreased functional mobility ;Decreased ADL status;Decreased safe awareness;Decreased cognition;Decreased balance;Decreased coordination;Decreased posture;Decreased high-level IADLs;Decreased endurance;Decreased strength  Assessment: Pt tolerated OT reassessment well. Pt able to maintain NWB status well. Pt would benefit from continued skilled OT services to increase I and safety during functional tasks to return home at [facility] as able.  Prognosis: Fair  Activity Tolerance  Activity Tolerance: Patient Tolerated treatment well  Safety Devices  Type of Devices: Call light within reach;Left in bed;Gait belt;Nurse notified  Restraints  Restraints Initially in Place: No      Restrictions/Precautions  Restrictions/Precautions  Restrictions/Precautions: General Precautions;Fall Risk;Weight Bearing  Activity Level: Up as Tolerated  Required Braces or Orthoses?: No  Lower Extremity Weight Bearing Restrictions  Right Lower Extremity Weight Bearing: Non Weight Bearing  Position Activity Restriction  Other Position/Activity Restrictions: R PICC line       Subjective  General  Chart Reviewed: Yes  Patient assessed for

## 2025-01-29 NOTE — PLAN OF CARE
Problem: Chronic Conditions and Co-morbidities  Goal: Patient's chronic conditions and co-morbidity symptoms are monitored and maintained or improved  Outcome: Progressing  Flowsheets (Taken 1/29/2025 0930)  Care Plan - Patient's Chronic Conditions and Co-Morbidity Symptoms are Monitored and Maintained or Improved:   Monitor and assess patient's chronic conditions and comorbid symptoms for stability, deterioration, or improvement   Collaborate with multidisciplinary team to address chronic and comorbid conditions and prevent exacerbation or deterioration   Update acute care plan with appropriate goals if chronic or comorbid symptoms are exacerbated and prevent overall improvement and discharge     Problem: Discharge Planning  Goal: Discharge to home or other facility with appropriate resources  Outcome: Progressing  Flowsheets (Taken 1/29/2025 0930)  Discharge to home or other facility with appropriate resources:   Identify barriers to discharge with patient and caregiver   Arrange for needed discharge resources and transportation as appropriate   Identify discharge learning needs (meds, wound care, etc)   Refer to discharge planning if patient needs post-hospital services based on physician order or complex needs related to functional status, cognitive ability or social support system     Problem: Pain  Goal: Verbalizes/displays adequate comfort level or baseline comfort level  Outcome: Progressing  Flowsheets (Taken 1/29/2025 0945)  Verbalizes/displays adequate comfort level or baseline comfort level:   Encourage patient to monitor pain and request assistance   Administer analgesics based on type and severity of pain and evaluate response   Assess pain using appropriate pain scale   Implement non-pharmacological measures as appropriate and evaluate response   Notify Licensed Independent Practitioner if interventions unsuccessful or patient reports new pain     Problem: Safety - Adult  Goal: Free from fall

## 2025-01-29 NOTE — PROGRESS NOTES
Progress Note  Foot and Ankle Surgery    CC/Reason for consult:   Osteomyelitis, 1st digit  Osteomyelitis, direct extension, calcaneus right  Achilles tendon rupture, right leg  Abscess, achilles tendon, right leg      Interval history:  - Patient seen and examined at bedside during rounding   - No acute complaints. Vital signs stable  - Patient is scheduled for surgery on 1/30/25 including fillet of great toe and incision and drainage of bone of right calcaneus. May be discharged from our standpoint and follow up outpatient     Updated Labs:     WBC:   Lab Results   Component Value Date    WBC 8.4 01/27/2025     ESR:  Lab Results   Component Value Date    SEDRATE 90 (H) 01/26/2025     CRP:  Lab Results   Component Value Date    CRP 38.6 (H) 01/26/2025       HPI:   Prateek Denton II is a 65 y.o. male diabetic patient seen at [facility] for foot wounds to the right lower extremity.  The patient presented to the ED on 1/21/2025 with a chief complaint of leg swelling, knee pain, leg pain and cough.  He was then admitted to medicine for management of osteomyelitis of the right hallux.  Patient states that approximately 2 weeks ago he noticed an ulcer on his right heel and odor noticed a wound to the right hallux about a month ago.  Patient states he has previously seen Dr. Del Valle for routine foot care.  Patient has a history of diabetic foot ulcers and was previously treating them at home with Medihoney.  Patient states the wounds progressively became worse and have recently become malodorous.  Patient reports local pain that he rates a 6 out of 10.  He describes the pain as constant and dull.  Patient denies any recent injury or trauma.  Patient denies constitutional symptoms at this time but states a few weeks ago he was experiencing nausea and chills.  Patient's labs in the ED were significant for CRP of 56.7, ESR 46 and WBC of 11.4.  Patient was afebrile upon presentation.  Patient has a past medical history

## 2025-01-29 NOTE — PLAN OF CARE
Problem: Chronic Conditions and Co-morbidities  Goal: Patient's chronic conditions and co-morbidity symptoms are monitored and maintained or improved  Outcome: Progressing  Flowsheets  Taken 1/28/2025 2145 by Sully Werner RN  Care Plan - Patient's Chronic Conditions and Co-Morbidity Symptoms are Monitored and Maintained or Improved:   Monitor and assess patient's chronic conditions and comorbid symptoms for stability, deterioration, or improvement   Collaborate with multidisciplinary team to address chronic and comorbid conditions and prevent exacerbation or deterioration  Taken 1/28/2025 0832 by Warner Roland RN  Care Plan - Patient's Chronic Conditions and Co-Morbidity Symptoms are Monitored and Maintained or Improved:   Monitor and assess patient's chronic conditions and comorbid symptoms for stability, deterioration, or improvement   Collaborate with multidisciplinary team to address chronic and comorbid conditions and prevent exacerbation or deterioration     Problem: Discharge Planning  Goal: Discharge to home or other facility with appropriate resources  Outcome: Progressing  Flowsheets  Taken 1/28/2025 2145 by Sully Werner RN  Discharge to home or other facility with appropriate resources:   Identify barriers to discharge with patient and caregiver   Arrange for needed discharge resources and transportation as appropriate   Identify discharge learning needs (meds, wound care, etc)  Taken 1/28/2025 0832 by Warner Roland RN  Discharge to home or other facility with appropriate resources:   Identify barriers to discharge with patient and caregiver   Arrange for needed discharge resources and transportation as appropriate   Identify discharge learning needs (meds, wound care, etc)     Problem: Pain  Goal: Verbalizes/displays adequate comfort level or baseline comfort level  Outcome: Progressing  Flowsheets (Taken 1/28/2025 2125)  Verbalizes/displays adequate comfort level or  cerebral perfusion and minimize risk of hemorrhage  Goal: Achieves maximal functionality and self care  Outcome: Progressing  Flowsheets  Taken 1/28/2025 2145 by Sully Werner RN  Achieves maximal functionality and self care: Encourage and assist patient to increase activity and self care with guidance from physical therapy/occupational therapy  Taken 1/28/2025 0832 by Warner Roland RN  Achieves maximal functionality and self care:   Monitor swallowing and airway patency with patient fatigue and changes in neurological status   Encourage and assist patient to increase activity and self care with guidance from physical therapy/occupational therapy     Problem: Musculoskeletal - Adult  Goal: Return mobility to safest level of function  Outcome: Progressing  Flowsheets  Taken 1/28/2025 2145 by Sully Werner RN  Return Mobility to Safest Level of Function:   Assess patient stability and activity tolerance for standing, transferring and ambulating with or without assistive devices   Assist with transfers and ambulation using safe patient handling equipment as needed   Ensure adequate protection for wounds/incisions during mobilization   Obtain physical therapy/occupational therapy consults as needed   Instruct patient/family in ordered activity level  Taken 1/28/2025 0832 by Warner Roland RN  Return Mobility to Safest Level of Function:   Assess patient stability and activity tolerance for standing, transferring and ambulating with or without assistive devices   Assist with transfers and ambulation using safe patient handling equipment as needed  Goal: Maintain proper alignment of affected body part  Outcome: Progressing  Flowsheets  Taken 1/28/2025 2145 by Sully Werner RN  Maintain proper alignment of affected body part: Support and protect limb and body alignment per provider's orders  Taken 1/28/2025 0832 by Warner Roland RN  Maintain proper alignment of affected body

## 2025-01-29 NOTE — PROGRESS NOTES
mg IntraDERmal Once    sodium zirconium cyclosilicate  10 g Oral BID    cefepime  1,000 mg IntraVENous Q12H    sodium bicarbonate  650 mg Oral TID    DAPTOmycin (CUBICIN) 600 mg in sodium chloride 0.9 % 50 mL IVPB  6 mg/kg (Adjusted) IntraVENous Q48H    sodium chloride flush  5-40 mL IntraVENous 2 times per day    dilTIAZem  60 mg Oral BID    DULoxetine  30 mg Oral Daily    hydrOXYzine HCl  50 mg Oral BID    metoprolol  100 mg Oral BID    [Held by provider] pravastatin  20 mg Oral Daily    traZODone  50 mg Oral Nightly    Vitamin D  1,000 Units Oral Daily    heparin (porcine)  5,000 Units SubCUTAneous 3 times per day    insulin lispro  0-8 Units SubCUTAneous 4x Daily AC & HS     Continuous Infusions:   dextrose 5 % and 0.9 % NaCl      sodium chloride      dextrose      sodium chloride       PRN Meds:sodium chloride flush, sodium chloride, glucose, dextrose bolus **OR** dextrose bolus, glucagon (rDNA), dextrose, sodium chloride flush, sodium chloride, ondansetron **OR** ondansetron, polyethylene glycol, bisacodyl, acetaminophen **OR** acetaminophen, oxyCODONE **OR** oxyCODONE      .  Results Review      Labs Review:     Recent Labs     01/27/25  0621 01/28/25  0549 01/29/25  0630    138 138   K 5.2 5.1 4.6    105 104   CO2 23 23 23   BUN 45* 43* 42*   CREATININE 3.5* 3.5* 3.6*   GLUCOSE 90 103 100   CALCIUM 9.5 9.2 9.2         Recent Labs     01/27/25  0621   WBC 8.4   RBC 4.06*   HGB 11.7*   HCT 35.8*   MCV 88.2   MCH 28.8   MCHC 32.7   RDW 12.3      MPV 10.2      BNP:  Lab Results   Component Value Date/Time    BNP 90 11/21/2012 05:43 AM    BNP 25 11/20/2012 06:20 AM    BNP 14 11/19/2012 05:48 AM         Phosphorus:    No results for input(s): \"PHOS\" in the last 72 hours.    Magnesium:   No results for input(s): \"MG\" in the last 72 hours.      Albumin: No results for input(s): \"LABALBU\" in the last 72 hours.  PTH:  No results for input(s): \"PTH\" in the last 72 hours.    Urine Studies:  Urine  Sodium:  No components found for: \"TRISH\"  Urine Potassium:    Lab Results   Component Value Date/Time    KUR 42.6 01/22/2025 02:57 PM     Urine Chloride:    Lab Results   Component Value Date/Time    CLUR 98 01/22/2025 02:57 PM     Urine Osmolarity:    Lab Results   Component Value Date/Time    OSMOU 526 10/10/2024 06:35 PM     Urine Creatinine:  No results found for: \"LABCREA\"  Urine Eosinophils: No components found for: \"UEOS\"  Urine Protein:  No results found for: \"TPU\"      Urine:    Lab Results   Component Value Date/Time    PROTEINU 3+ 01/22/2025 02:57 PM         Serology:  FARIBA:   Lab Results   Component Value Date/Time    FARIBA NEGATIVE 09/28/2020 04:53 AM     C3:  Lab Results   Component Value Date/Time    C3 102 09/28/2020 04:53 AM     C4:  Lab Results   Component Value Date/Time    C4 20 09/28/2020 04:53 AM     MPO ANCA:  No results found for: \"MPO\"  PR3 ANCA:  No results found for: \"PR3\"  hepatitis serologies  ANTIGBM:No results found for: \"GBMABIGG\"  HEPATITIS B SURFACE AG: No results found for: \"HEPBSAG\"  HEPATITIS C AB:   Lab Results   Component Value Date/Time    HEPCAB NONREACTIVE 11/10/2020 09:15 AM       Electrophoresis:  SPEP:No results found for: \"ALBCAL\", \"ALBPCT\", \"LABALPH\", \"A1PCT\", \"A2PCT\", \"LABBETA\", \"BETAPCT\", \"GAMGLOB\", \"GGPCT\", \"PATH\"  UPEP:No results found for: \"LABPE\"         Thank you for the consultation. Please do not hesitate to contact us for any further questions/concerns. We will continue to follow along with you.        Electronically signed by Cesar Duffy MD  on 1/29/2025 at 1:11 PM

## 2025-01-29 NOTE — PROGRESS NOTES
Physical Therapy  Facility/Department: [facility]   Physical Therapy Re-Evaluation    Patient Name: Prateek Denton II        MRN: [id number]    : 1960    Date of Service: 2025  RN Zunilda reports patient is medically stable for therapy treatment this date.    Chart reviewed prior to treatment and patient is agreeable for therapy.  All lines intact and patient positioned comfortably at end of treatment.  All patient needs addressed prior to ending therapy session.      Chief Complaint   Patient presents with    Leg Swelling    Knee Pain    Leg Pain    Cough     Past Medical History:  has a past medical history of Acute blood loss anemia, Arthritis, Back pain, Cellulitis, CKD (chronic kidney disease) stage 3, GFR 30-59 ml/min (HCC), Colostomy RUQ, Frequent headaches, Gastrointestinal hemorrhage with melena, Gout, Hemorrhagic shock (HCC), Hernia of abdominal wall, History of atrial fibrillation, History of blood transfusion, [place] (hard of hearing), HTN, Hyperkalemia, Hyperlipidemia, Incisional hernia, Kidney infection, Large bowel obstruction (HCC), Morbid obesity due to excess calories, Paroxysmal A-fib (HCC), SBO (small bowel obstruction) (HCC), Sepsis (HCC), Single kidney, Sleep apnea, T2DM, Tooth missing, Under care of team, Upper GI bleed, Wears glasses, and Wellness examination.  Past Surgical History:  has a past surgical history that includes Ankle surgery (Right, ); Carpal tunnel release (Bilateral); Cystoscopy (Right, 2020); hc cath power picc triple (2020); Kidney surgery (Left, 2020); Small intestine surgery (N/A, 2020); laparotomy (N/A, 2020); Tonsillectomy; Knee arthroscopy (Bilateral); Adenoidectomy; cysto/uretero/pyeloscopy, calculus tx (Right, 10/30/2020); colostomy; Small intestine surgery (N/A, 2020); Tympanostomy tube placement; Upper gastrointestinal endoscopy (N/A, 2022); Colonoscopy (N/A, 2023); Upper gastrointestinal endoscopy (N/A,

## 2025-01-29 NOTE — PROGRESS NOTES
[facility]  Progress Note    Prateek Denton II  MRN: [id number]  Date: 1/29/2025  LOS: 8     Reason for F/U :   Right great toe osteomyelitis and right calcaneal osteomyelitis    Impression :   Right great toe distal aspect diabetic foot ulceration with associated osteomyelitis on MRI  Right posterior heel diabetic ulceration with calcaneal osteomyelitis on MRI  Hyperkalemia  Diabetes mellitus type 2 with peripheral neuropathy  Atrial fibrillation  Acute on chronic kidney disease stage IV  Single right kidney since 2020  Hypertension  Hyperlipidemia    Recommendations:   The patient continues on IV antimicrobial therapy with cefepime and daptomycin   The plans are for the patient to be taken to the operating room tomorrow for a great toe amputation and incision and drainage of the heel wound.  We will follow the operative findings and make final antibiotic recommendations thereafter    Infection Control Recommendations:   Universal precautions    Discharge Planning:   Estimated Length of IV antimicrobials: To be determined  Patient will need Midline Catheter Insertion/ PICC line Insertion: No  Patient will need: Home IV , Infusion Center,  SNF,  LTAC: Undetermined  Patient willneed outpatient wound care: No    Medical Decision making / Summary of Stay:   Prateek Denton II is a 65 y.o.-year-old male who was initially admitted on 1/21/2025.  Patient has a known history of chronic kidney disease stage IV, left-sided nephrectomy in 2020, diabetes mellitus with peripheral neuropathy, atrial fibrillation, hypertension, hyperlipidemia.  Patient does report seeing podiatry quite some time ago for a left foot plantar aspect wound, which she used Medihoney and that went away.     Patient presented to the emergency department 1/21/2025 with complaints of right lower extremity swelling related to wounds on his foot.  Patient states that he has had a wound on his right heel for several months but about 2  weeks ago he noticed that the right lower extremity was becoming more red and swollen in addition to this particular wound draining.  He started using Medihoney at that time.  Patient also has an ulcer on the distal aspect of his right great toe that he reports was closed prior to coming into the hospital.  He does not do foot checks and he also does not check his blood sugars.  He has been having chills for several weeks, as well as nausea with intermittent emesis for a couple of weeks.  He has not checked his temperature.  He decided to come to the emergency department yesterday due to the worsening swelling, pain and redness.  Upon presentation, the wound is malodorous.  Patient has been seen by the podiatry team, who have debrided the wounds.  Cultures have also been sent, as well as blood cultures.  Patient has been started on IV vancomycin and cefepime.  We have been asked to evaluate the patient for antimicrobial therapy management.       Current evaluation:2025    BP (!) 149/85   Pulse 82   Temp 98.1 °F (36.7 °C)   Resp 16   Ht 1.803 m (5' 11\")   Wt 124.3 kg (274 lb 1.6 oz)   SpO2 96%   BMI 38.23 kg/m²     Temperature Range: Temp: 98.1 °F (36.7 °C) Temp  Av.8 °F (36.6 °C)  Min: 97.3 °F (36.3 °C)  Max: 98.2 °F (36.8 °C)  The patient is seen and evaluated at bedside and is awake and alert in no acute distress.  No subjective fever or chills.  No abdominal pain nausea vomiting or diarrhea    Review of Systems   Constitutional: Negative.    HENT: Negative.     Respiratory: Negative.     Cardiovascular: Negative.    Gastrointestinal: Negative.    Genitourinary: Negative.    Musculoskeletal: Negative.    Skin:  Positive for wound.   Neurological: Negative.    Psychiatric/Behavioral: Negative.         Physical Examination :     Physical Exam  Constitutional:       Appearance: He is well-developed.   HENT:      Head: Normocephalic and atraumatic.   Cardiovascular:      Rate and Rhythm: Normal rate.

## 2025-01-30 ENCOUNTER — ANESTHESIA EVENT (OUTPATIENT)
Dept: OPERATING ROOM | Age: 65
End: 2025-01-30
Payer: MEDICARE

## 2025-01-30 ENCOUNTER — APPOINTMENT (OUTPATIENT)
Dept: GENERAL RADIOLOGY | Age: 65
DRG: 617 | End: 2025-01-30
Payer: MEDICARE

## 2025-01-30 ENCOUNTER — ANESTHESIA (OUTPATIENT)
Dept: OPERATING ROOM | Age: 65
End: 2025-01-30
Payer: MEDICARE

## 2025-01-30 LAB
ANION GAP SERPL CALCULATED.3IONS-SCNC: 11 MMOL/L (ref 9–16)
ATYPICAL LYMPHOCYTE ABSOLUTE COUNT: 0.38 K/UL
ATYPICAL LYMPHOCYTES: 4 %
BASOPHILS # BLD: 0 K/UL (ref 0–0.2)
BASOPHILS NFR BLD: 0 %
BUN SERPL-MCNC: 41 MG/DL (ref 8–23)
CALCIUM SERPL-MCNC: 8.9 MG/DL (ref 8.8–10.2)
CHLORIDE SERPL-SCNC: 104 MMOL/L (ref 98–107)
CO2 SERPL-SCNC: 23 MMOL/L (ref 20–31)
CREAT SERPL-MCNC: 3.5 MG/DL (ref 0.7–1.2)
CRP SERPL HS-MCNC: 35.8 MG/L (ref 0–5)
EOSINOPHIL # BLD: 0.19 K/UL (ref 0–0.4)
EOSINOPHILS RELATIVE PERCENT: 2 % (ref 1–4)
ERYTHROCYTE [DISTWIDTH] IN BLOOD BY AUTOMATED COUNT: 12.6 % (ref 11.8–14.4)
ERYTHROCYTE [SEDIMENTATION RATE] IN BLOOD BY PHOTOMETRIC METHOD: 42 MM/HR (ref 0–20)
GFR, ESTIMATED: 18 ML/MIN/1.73M2
GLUCOSE BLD-MCNC: 108 MG/DL (ref 75–110)
GLUCOSE BLD-MCNC: 119 MG/DL (ref 75–110)
GLUCOSE BLD-MCNC: 120 MG/DL (ref 75–110)
GLUCOSE BLD-MCNC: 128 MG/DL (ref 75–110)
GLUCOSE BLD-MCNC: 179 MG/DL (ref 75–110)
GLUCOSE SERPL-MCNC: 108 MG/DL (ref 82–115)
HCT VFR BLD AUTO: 30.9 % (ref 40.7–50.3)
HGB BLD-MCNC: 10.2 G/DL (ref 13–17)
IMM GRANULOCYTES # BLD AUTO: 0 K/UL (ref 0–0.3)
IMM GRANULOCYTES NFR BLD: 0 %
LYMPHOCYTES NFR BLD: 2.21 K/UL (ref 1–4.8)
LYMPHOCYTES RELATIVE PERCENT: 23 % (ref 24–44)
MCH RBC QN AUTO: 28.8 PG (ref 25.2–33.5)
MCHC RBC AUTO-ENTMCNC: 33 G/DL (ref 28.4–34.8)
MCV RBC AUTO: 87.3 FL (ref 82.6–102.9)
MONOCYTES NFR BLD: 0.48 K/UL (ref 0.2–0.8)
MONOCYTES NFR BLD: 5 % (ref 1–7)
NEUTROPHILS NFR BLD: 66 % (ref 36–66)
NEUTS SEG NFR BLD: 6.34 K/UL (ref 1.8–7.7)
NRBC BLD-RTO: 0 PER 100 WBC
PLATELET # BLD AUTO: 234 K/UL (ref 138–453)
PMV BLD AUTO: 10 FL (ref 8.1–13.5)
POTASSIUM SERPL-SCNC: 3.8 MMOL/L (ref 3.7–5.3)
RBC # BLD AUTO: 3.54 M/UL (ref 4.21–5.77)
SODIUM SERPL-SCNC: 138 MMOL/L (ref 136–145)
WBC OTHER # BLD: 9.6 K/UL (ref 3.5–11.3)

## 2025-01-30 PROCEDURE — 6360000002 HC RX W HCPCS

## 2025-01-30 PROCEDURE — 99232 SBSQ HOSP IP/OBS MODERATE 35: CPT | Performed by: NURSE PRACTITIONER

## 2025-01-30 PROCEDURE — 0Y6M0Z4 DETACHMENT AT RIGHT FOOT, COMPLETE 1ST RAY, OPEN APPROACH: ICD-10-PCS | Performed by: PODIATRIST

## 2025-01-30 PROCEDURE — 1200000000 HC SEMI PRIVATE

## 2025-01-30 PROCEDURE — 87205 SMEAR GRAM STAIN: CPT

## 2025-01-30 PROCEDURE — 6370000000 HC RX 637 (ALT 250 FOR IP)

## 2025-01-30 PROCEDURE — C1713 ANCHOR/SCREW BN/BN,TIS/BN: HCPCS | Performed by: PODIATRIST

## 2025-01-30 PROCEDURE — 2580000003 HC RX 258

## 2025-01-30 PROCEDURE — 2580000003 HC RX 258: Performed by: INTERNAL MEDICINE

## 2025-01-30 PROCEDURE — 85652 RBC SED RATE AUTOMATED: CPT

## 2025-01-30 PROCEDURE — 3600000002 HC SURGERY LEVEL 2 BASE: Performed by: PODIATRIST

## 2025-01-30 PROCEDURE — 82947 ASSAY GLUCOSE BLOOD QUANT: CPT

## 2025-01-30 PROCEDURE — 2709999900 HC NON-CHARGEABLE SUPPLY: Performed by: PODIATRIST

## 2025-01-30 PROCEDURE — 7100000000 HC PACU RECOVERY - FIRST 15 MIN: Performed by: PODIATRIST

## 2025-01-30 PROCEDURE — 88305 TISSUE EXAM BY PATHOLOGIST: CPT

## 2025-01-30 PROCEDURE — 3700000000 HC ANESTHESIA ATTENDED CARE: Performed by: PODIATRIST

## 2025-01-30 PROCEDURE — 7100000001 HC PACU RECOVERY - ADDTL 15 MIN: Performed by: PODIATRIST

## 2025-01-30 PROCEDURE — 73630 X-RAY EXAM OF FOOT: CPT

## 2025-01-30 PROCEDURE — 88311 DECALCIFY TISSUE: CPT

## 2025-01-30 PROCEDURE — 86140 C-REACTIVE PROTEIN: CPT

## 2025-01-30 PROCEDURE — 87076 CULTURE ANAEROBE IDENT EACH: CPT

## 2025-01-30 PROCEDURE — 2580000003 HC RX 258: Performed by: NURSE ANESTHETIST, CERTIFIED REGISTERED

## 2025-01-30 PROCEDURE — 6360000002 HC RX W HCPCS: Performed by: ANESTHESIOLOGY

## 2025-01-30 PROCEDURE — 99232 SBSQ HOSP IP/OBS MODERATE 35: CPT | Performed by: INTERNAL MEDICINE

## 2025-01-30 PROCEDURE — 6370000000 HC RX 637 (ALT 250 FOR IP): Performed by: EMERGENCY MEDICINE

## 2025-01-30 PROCEDURE — 87070 CULTURE OTHR SPECIMN AEROBIC: CPT

## 2025-01-30 PROCEDURE — 87185 SC STD ENZYME DETCJ PER NZM: CPT

## 2025-01-30 PROCEDURE — 6360000002 HC RX W HCPCS: Performed by: NURSE ANESTHETIST, CERTIFIED REGISTERED

## 2025-01-30 PROCEDURE — 3700000001 HC ADD 15 MINUTES (ANESTHESIA): Performed by: PODIATRIST

## 2025-01-30 PROCEDURE — 6360000002 HC RX W HCPCS: Performed by: PODIATRIST

## 2025-01-30 PROCEDURE — 6360000002 HC RX W HCPCS: Performed by: INTERNAL MEDICINE

## 2025-01-30 PROCEDURE — 87077 CULTURE AEROBIC IDENTIFY: CPT

## 2025-01-30 PROCEDURE — 80048 BASIC METABOLIC PNL TOTAL CA: CPT

## 2025-01-30 PROCEDURE — 0J9Q0ZX DRAINAGE OF RIGHT FOOT SUBCUTANEOUS TISSUE AND FASCIA, OPEN APPROACH, DIAGNOSTIC: ICD-10-PCS | Performed by: PODIATRIST

## 2025-01-30 PROCEDURE — 87075 CULTR BACTERIA EXCEPT BLOOD: CPT

## 2025-01-30 PROCEDURE — 0SPF0JZ REMOVAL OF SYNTHETIC SUBSTITUTE FROM RIGHT ANKLE JOINT, OPEN APPROACH: ICD-10-PCS | Performed by: PODIATRIST

## 2025-01-30 PROCEDURE — 85025 COMPLETE CBC W/AUTO DIFF WBC: CPT

## 2025-01-30 PROCEDURE — 36415 COLL VENOUS BLD VENIPUNCTURE: CPT

## 2025-01-30 PROCEDURE — 3600000012 HC SURGERY LEVEL 2 ADDTL 15MIN: Performed by: PODIATRIST

## 2025-01-30 DEVICE — IMPLANTABLE DEVICE
Type: IMPLANTABLE DEVICE | Site: HEEL | Status: FUNCTIONAL
Brand: CERAMENT™BONE VOID FILLER

## 2025-01-30 RX ORDER — PROPOFOL 10 MG/ML
INJECTION, EMULSION INTRAVENOUS
Status: DISCONTINUED | OUTPATIENT
Start: 2025-01-30 | End: 2025-01-30 | Stop reason: SDUPTHER

## 2025-01-30 RX ORDER — ONDANSETRON 2 MG/ML
INJECTION INTRAMUSCULAR; INTRAVENOUS
Status: DISCONTINUED | OUTPATIENT
Start: 2025-01-30 | End: 2025-01-30 | Stop reason: SDUPTHER

## 2025-01-30 RX ORDER — FENTANYL CITRATE 50 UG/ML
INJECTION, SOLUTION INTRAMUSCULAR; INTRAVENOUS
Status: DISCONTINUED | OUTPATIENT
Start: 2025-01-30 | End: 2025-01-30 | Stop reason: SDUPTHER

## 2025-01-30 RX ORDER — ONDANSETRON 2 MG/ML
4 INJECTION INTRAMUSCULAR; INTRAVENOUS
Status: DISCONTINUED | OUTPATIENT
Start: 2025-01-30 | End: 2025-01-30 | Stop reason: HOSPADM

## 2025-01-30 RX ORDER — PROCHLORPERAZINE EDISYLATE 5 MG/ML
5 INJECTION INTRAMUSCULAR; INTRAVENOUS
Status: DISCONTINUED | OUTPATIENT
Start: 2025-01-30 | End: 2025-01-30 | Stop reason: HOSPADM

## 2025-01-30 RX ORDER — SODIUM CHLORIDE 9 MG/ML
INJECTION, SOLUTION INTRAVENOUS
Status: DISCONTINUED | OUTPATIENT
Start: 2025-01-30 | End: 2025-01-30 | Stop reason: SDUPTHER

## 2025-01-30 RX ORDER — DIPHENHYDRAMINE HYDROCHLORIDE 50 MG/ML
12.5 INJECTION INTRAMUSCULAR; INTRAVENOUS
Status: DISCONTINUED | OUTPATIENT
Start: 2025-01-30 | End: 2025-01-30 | Stop reason: HOSPADM

## 2025-01-30 RX ORDER — HYDRALAZINE HYDROCHLORIDE 20 MG/ML
INJECTION INTRAMUSCULAR; INTRAVENOUS
Status: DISCONTINUED | OUTPATIENT
Start: 2025-01-30 | End: 2025-01-30 | Stop reason: SDUPTHER

## 2025-01-30 RX ORDER — LIDOCAINE HYDROCHLORIDE 20 MG/ML
INJECTION, SOLUTION EPIDURAL; INFILTRATION; INTRACAUDAL; PERINEURAL
Status: DISCONTINUED | OUTPATIENT
Start: 2025-01-30 | End: 2025-01-30 | Stop reason: SDUPTHER

## 2025-01-30 RX ORDER — FENTANYL CITRATE 50 UG/ML
25 INJECTION, SOLUTION INTRAMUSCULAR; INTRAVENOUS EVERY 5 MIN PRN
Status: DISCONTINUED | OUTPATIENT
Start: 2025-01-30 | End: 2025-01-30 | Stop reason: HOSPADM

## 2025-01-30 RX ORDER — VANCOMYCIN HYDROCHLORIDE 500 MG/10ML
INJECTION, POWDER, LYOPHILIZED, FOR SOLUTION INTRAVENOUS PRN
Status: DISCONTINUED | OUTPATIENT
Start: 2025-01-30 | End: 2025-01-30 | Stop reason: ALTCHOICE

## 2025-01-30 RX ORDER — ROPIVACAINE HYDROCHLORIDE 5 MG/ML
INJECTION, SOLUTION EPIDURAL; INFILTRATION; PERINEURAL
Status: COMPLETED | OUTPATIENT
Start: 2025-01-30 | End: 2025-01-30

## 2025-01-30 RX ADMIN — ONDANSETRON 4 MG: 2 INJECTION INTRAMUSCULAR; INTRAVENOUS at 16:04

## 2025-01-30 RX ADMIN — DEXTROSE AND SODIUM CHLORIDE: 5; 900 INJECTION, SOLUTION INTRAVENOUS at 00:45

## 2025-01-30 RX ADMIN — SODIUM ZIRCONIUM CYCLOSILICATE 10 G: 10 POWDER, FOR SUSPENSION ORAL at 08:08

## 2025-01-30 RX ADMIN — HYDRALAZINE HYDROCHLORIDE 4 MG: 20 INJECTION INTRAMUSCULAR; INTRAVENOUS at 16:02

## 2025-01-30 RX ADMIN — ROPIVACAINE HYDROCHLORIDE 30 ML: 5 INJECTION, SOLUTION EPIDURAL; INFILTRATION; PERINEURAL at 14:15

## 2025-01-30 RX ADMIN — DAPTOMYCIN 600 MG: 500 INJECTION, POWDER, LYOPHILIZED, FOR SOLUTION INTRAVENOUS at 22:47

## 2025-01-30 RX ADMIN — HYDROXYZINE HYDROCHLORIDE 50 MG: 25 TABLET ORAL at 21:42

## 2025-01-30 RX ADMIN — Medication 1000 UNITS: at 21:42

## 2025-01-30 RX ADMIN — LIDOCAINE HYDROCHLORIDE 50 MG: 20 INJECTION, SOLUTION EPIDURAL; INFILTRATION; INTRACAUDAL; PERINEURAL at 14:47

## 2025-01-30 RX ADMIN — METOPROLOL 100 MG: 100 TABLET ORAL at 21:41

## 2025-01-30 RX ADMIN — FENTANYL CITRATE 25 MCG: 50 INJECTION INTRAMUSCULAR; INTRAVENOUS at 14:48

## 2025-01-30 RX ADMIN — TRAZODONE HYDROCHLORIDE 50 MG: 50 TABLET ORAL at 21:42

## 2025-01-30 RX ADMIN — HYDROXYZINE HYDROCHLORIDE 50 MG: 25 TABLET ORAL at 08:01

## 2025-01-30 RX ADMIN — CEFEPIME 1000 MG: 1 INJECTION, POWDER, FOR SOLUTION INTRAMUSCULAR; INTRAVENOUS at 08:04

## 2025-01-30 RX ADMIN — SODIUM CHLORIDE: 9 INJECTION, SOLUTION INTRAVENOUS at 14:42

## 2025-01-30 RX ADMIN — DILTIAZEM HYDROCHLORIDE 60 MG: 60 TABLET ORAL at 21:41

## 2025-01-30 RX ADMIN — SODIUM BICARBONATE 650 MG: 650 TABLET ORAL at 08:01

## 2025-01-30 RX ADMIN — DULOXETINE 30 MG: 30 CAPSULE, DELAYED RELEASE ORAL at 08:01

## 2025-01-30 RX ADMIN — CEFEPIME 1000 MG: 1 INJECTION, POWDER, FOR SOLUTION INTRAMUSCULAR; INTRAVENOUS at 21:48

## 2025-01-30 RX ADMIN — SODIUM BICARBONATE 650 MG: 650 TABLET ORAL at 21:41

## 2025-01-30 RX ADMIN — DILTIAZEM HYDROCHLORIDE 60 MG: 60 TABLET ORAL at 08:01

## 2025-01-30 RX ADMIN — METOPROLOL 100 MG: 100 TABLET ORAL at 08:01

## 2025-01-30 RX ADMIN — PROPOFOL 50 MCG/KG/MIN: 10 INJECTION, EMULSION INTRAVENOUS at 14:48

## 2025-01-30 RX ADMIN — HEPARIN SODIUM 5000 UNITS: 5000 INJECTION INTRAVENOUS; SUBCUTANEOUS at 21:44

## 2025-01-30 RX ADMIN — OXYCODONE HYDROCHLORIDE 10 MG: 5 TABLET ORAL at 21:42

## 2025-01-30 ASSESSMENT — PAIN - FUNCTIONAL ASSESSMENT: PAIN_FUNCTIONAL_ASSESSMENT: NONE - DENIES PAIN

## 2025-01-30 ASSESSMENT — PAIN SCALES - GENERAL
PAINLEVEL_OUTOF10: 4
PAINLEVEL_OUTOF10: 8

## 2025-01-30 ASSESSMENT — ENCOUNTER SYMPTOMS
SHORTNESS OF BREATH: 0
GASTROINTESTINAL NEGATIVE: 1
RESPIRATORY NEGATIVE: 1

## 2025-01-30 ASSESSMENT — PAIN DESCRIPTION - LOCATION: LOCATION: FOOT

## 2025-01-30 ASSESSMENT — PAIN DESCRIPTION - ORIENTATION: ORIENTATION: MID

## 2025-01-30 ASSESSMENT — PAIN DESCRIPTION - DESCRIPTORS: DESCRIPTORS: BURNING

## 2025-01-30 NOTE — PROGRESS NOTES
Progress Note  Foot and Ankle Surgery    CC/Reason for consult:   Osteomyelitis, 1st digit  Osteomyelitis, direct extension, calcaneus right  Achilles tendon rupture, right leg  Abscess, achilles tendon, right leg      Interval history:  - Patient is scheduled for surgery on 1/30/25 including fillet of great toe and incision and drainage of bone of right calcaneus.     Updated Labs:     WBC:   Lab Results   Component Value Date    WBC 8.4 01/27/2025     ESR:  Lab Results   Component Value Date    SEDRATE 90 (H) 01/26/2025     CRP:  Lab Results   Component Value Date    CRP 38.6 (H) 01/26/2025       HPI:   Prateek Denton II is a 65 y.o. male diabetic patient seen at [facility] for foot wounds to the right lower extremity.  The patient presented to the ED on 1/21/2025 with a chief complaint of leg swelling, knee pain, leg pain and cough.  He was then admitted to medicine for management of osteomyelitis of the right hallux.  Patient states that approximately 2 weeks ago he noticed an ulcer on his right heel and odor noticed a wound to the right hallux about a month ago.  Patient states he has previously seen Dr. Del Valle for routine foot care.  Patient has a history of diabetic foot ulcers and was previously treating them at home with Medihoney.  Patient states the wounds progressively became worse and have recently become malodorous.  Patient reports local pain that he rates a 6 out of 10.  He describes the pain as constant and dull.  Patient denies any recent injury or trauma.  Patient denies constitutional symptoms at this time but states a few weeks ago he was experiencing nausea and chills.  Patient's labs in the ED were significant for CRP of 56.7, ESR 46 and WBC of 11.4.  Patient was afebrile upon presentation.  Patient has a past medical history significant for CKD with a unilateral kidney as of 2020, diabetes mellitus type 2, paroxysmal A-fib not on blood thinning medication due to previous GI bleed,

## 2025-01-30 NOTE — ANESTHESIA PROCEDURE NOTES
Peripheral Block    Patient location during procedure: holding area  Reason for block: primary anesthetic  Start time: 1/30/2025 2:15 PM  End time: 1/30/2025 2:20 PM  Staffing  Performed: anesthesiologist   Anesthesiologist: Saida Jeffrey MD  Performed by: Saida Jeffrey MD  Authorized by: Saida Jeffrey MD    Preanesthetic Checklist  Completed: patient identified, IV checked, site marked, risks and benefits discussed, surgical/procedural consents, equipment checked, pre-op evaluation, timeout performed, anesthesia consent given, oxygen available, monitors applied/VS acknowledged, fire risk safety assessment completed and verbalized and blood product R/B/A discussed and consented  Peripheral Block   Patient position: supine  Prep: ChloraPrep and site prepped and draped  Provider prep: mask and sterile gloves  Patient monitoring: cardiac monitor, continuous pulse ox, frequent blood pressure checks, IV access, oxygen and responsive to questions  Block type: Sciatic  Popliteal  Laterality: right  Injection technique: single-shot  Guidance: ultrasound guided  Local infiltration: lidocaine  Infiltration strength: 1 %  Local infiltration: lidocaine  Dose: 3 mL    Needle   Needle type: insulated echogenic nerve stimulator needle   Needle gauge: 20 G  Needle localization: ultrasound guidance  Needle length: 5 cm  Assessment   Injection assessment: negative aspiration for heme, no paresthesia on injection, local visualized surrounding nerve on ultrasound and no intravascular symptoms  Paresthesia pain: none  Slow fractionated injection: yes  Hemodynamics: stable  Outcomes: patient tolerated procedure well and uncomplicated    Additional Notes  U/S [id number].  Time out performed.         Medications Administered  ropivacaine (NAROPIN) injection 0.5% - Perineural   30 mL - 1/30/2025 2:15:00 PM

## 2025-01-30 NOTE — PROGRESS NOTES
[facility]  Progress Note    Prateek Denton II  MRN: [id number]  Date: 1/30/2025  LOS: 9     Reason for F/U :   Right great toe and posterior heel diabetic foot ulcerations    Impression :   Right great toe distal aspect diabetic foot ulceration with associated osteomyelitis on MRI  Right posterior heel diabetic ulceration with calcaneal osteomyelitis on MRI  Hyperkalemia  Diabetes mellitus type 2 with peripheral neuropathy  Atrial fibrillation  Acute on chronic kidney disease stage IV  Single right kidney since 2020  Hypertension  Hyperlipidemia    Recommendations:   Patient continues on IV antimicrobial therapy with cefepime and daptomycin  The plans are for the patient to be taken to the operating room today for great toe amputation and incision and drainage of the heel wound  We will follow operative findings, follow clinical progress and adjust therapy accordingly    Infection Control Recommendations:   Universal precautions    Discharge Planning:   Estimated Length of IV antimicrobials: To be determined  Patient will need Midline Catheter Insertion/ PICC line Insertion: No  Patient will need: Home IV , Infusion Center,  SNF,  LTAC: Undetermined  Patient willneed outpatient wound care: No    Medical Decision making / Summary of Stay:   Prateek Denton II is a 65 y.o.-year-old male who was initially admitted on 1/21/2025.  Patient has a known history of chronic kidney disease stage IV, left-sided nephrectomy in 2020, diabetes mellitus with peripheral neuropathy, atrial fibrillation, hypertension, hyperlipidemia.  Patient does report seeing podiatry quite some time ago for a left foot plantar aspect wound, which she used Medihoney and that went away.     Patient presented to the emergency department 1/21/2025 with complaints of right lower extremity swelling related to wounds on his foot.  Patient states that he has had a wound on his right heel for several months but about 2 weeks ago he    Cardiovascular:      Rate and Rhythm: Normal rate and regular rhythm.   Pulmonary:      Effort: Pulmonary effort is normal. No respiratory distress.      Breath sounds: Normal breath sounds.   Abdominal:      General: Abdomen is flat. Bowel sounds are normal. There is no distension.      Palpations: Abdomen is soft.   Musculoskeletal:      Comments: Right foot dressing in place, not removed today   Skin:     General: Skin is warm and dry.   Neurological:      General: No focal deficit present.      Mental Status: He is alert and oriented to person, place, and time. Mental status is at baseline.   Psychiatric:         Mood and Affect: Mood normal.         Behavior: Behavior normal.         Thought Content: Thought content normal.         Laboratory data:   I have independently reviewed the followinglabs:  CBC with Differential:   Recent Labs     01/27/25  0621   WBC 8.4   HGB 11.7*   HCT 35.8*      LYMPHOPCT 23*   MONOPCT 10*   EOSPCT 4     BMP:   Recent Labs     01/28/25  0549 01/29/25  0630    138   K 5.1 4.6    104   CO2 23 23   BUN 43* 42*   CREATININE 3.5* 3.6*     Hepatic Function Panel: No results for input(s): \"LABALBU\", \"BILIDIR\", \"IBILI\", \"BILITOT\", \"ALKPHOS\", \"ALT\", \"AST\" in the last 72 hours.    Invalid input(s): \"PROT\"      Lab Results   Component Value Date/Time    PROCAL 1.69 03/12/2024 05:58 AM    PROCAL 0.18 09/22/2020 05:20 PM    PROCAL 0.20 09/01/2020 02:40 PM     Lab Results   Component Value Date/Time    CRP 38.6 01/26/2025 06:41 AM    CRP 47.6 01/25/2025 06:20 AM    CRP 63.5 01/24/2025 06:16 AM     Lab Results   Component Value Date    SEDRATE 90 (H) 01/26/2025         Lab Results   Component Value Date/Time    DDIMER 2.07 02/29/2024 08:45 AM    DDIMER 0.40 01/03/2022 03:00 PM    DDIMER 0.84 12/12/2016 01:53 PM    DDIMER 1.22 08/29/2012 12:40 PM     Lab Results   Component Value Date/Time    FERRITIN 1,539 03/13/2024 02:13 PM    FERRITIN 1,431 09/05/2020 02:34 AM     No

## 2025-01-30 NOTE — ANESTHESIA PRE PROCEDURE
Department of Anesthesiology  Preprocedure Note       Name:  Prateek Denton II   Age:  65 y.o.  :  1960                                          MRN:  [id number]         Date:  2025      Surgeon: Surgeon(s):  Trey Del Valle DPM    Procedure: Procedure(s):  FILLET OF RIGHT FIRST TOE, I & D BONE RIGHT CALCANEOUS  TOE AMPUTATION    Medications prior to admission:   Prior to Admission medications    Medication Sig Start Date End Date Taking? Authorizing Provider   Vitamin D (CHOLECALCIFEROL) 25 MCG (1000 UT) TABS tablet Take 1 tablet by mouth daily 10/12/24  Yes Andrew Roberts MD   dilTIAZem (CARDIZEM) 60 MG tablet Take 1 tablet by mouth 2 times daily 10/12/24  Yes Andrew Roberts MD   omeprazole (PRILOSEC) 20 MG delayed release capsule Take 1 capsule by mouth daily   Yes ProviderSouleymane MD   magnesium oxide (MAG-OX) 400 (240 Mg) MG tablet Take 1 tablet by mouth daily   Yes ProviderSouleymane MD   traZODone (DESYREL) 50 MG tablet TAKE 1 TABLET BY MOUTH EVERY DAY AT NIGHT 24  Yes Lisseth Coello APRN - CNP   pravastatin (PRAVACHOL) 20 MG tablet TAKE 1 TABLET BY MOUTH EVERY DAY 24  Yes Lisseth Coello APRN - CNP   metoprolol (LOPRESSOR) 100 MG tablet Take 1 tablet by mouth 2 times daily 11/3/21  Yes Souleymane Limon MD   Continuous Glucose Sensor (FREESTYLE RANJIT 3 PLUS SENSOR) MISC Wear continuous for glucose monitoring 24   Lisseth Coello APRN - CNP   albuterol sulfate HFA (PROVENTIL;VENTOLIN;PROAIR) 108 (90 Base) MCG/ACT inhaler INHALE 2 PUFFS INTO THE LUNGS 4 TIMES DAILY AS NEEDED FOR WHEEZING OR SHORTNESS OF BREATH.  Patient not taking: Reported on 2025 10/25/24   Lisseth Coello APRN - CNP   ferrous sulfate (FE TABS 325) 325 (65 Fe) MG EC tablet Take 1 tablet by mouth daily  Patient not taking: Reported on 2025 10/12/24   Andrew Roberts MD   hydrOXYzine HCl (ATARAX) 50 MG tablet TAKE 1 TABLET BY MOUTH EVERY DAY AT NIGHT  Patient taking

## 2025-01-30 NOTE — ANESTHESIA POSTPROCEDURE EVALUATION
Department of Anesthesiology  Postprocedure Note    Patient: Prateek Denton II  MRN: [id number]  YOB: 1960  Date of evaluation: 1/30/2025    Procedure Summary       Date: 01/30/25 Room / Location: [address]    Anesthesia Start: 1442 Anesthesia Stop: 1626    Procedures:       FILLET OF RIGHT FIRST TOE, I & D BONE RIGHT CALCANEOUS (Right: Foot)      TOE AMPUTATION (Right: Foot) Diagnosis:       Acute osteomyelitis of right foot      (Acute osteomyelitis of right foot [M86.171])    Surgeons: Trey Del Valle DPM Responsible Provider: Saida Jeffrey MD    Anesthesia Type: general ASA Status: 3            Anesthesia Type: No value filed.    Destiny Phase I: Destiny Score: 10    Destiny Phase II:      Anesthesia Post Evaluation    Airway patency: patent  Cardiovascular status: hemodynamically stable  Respiratory status: acceptable    No notable events documented.

## 2025-01-30 NOTE — PROGRESS NOTES
I MENTIONED CONCERN ABOUT MIXING VANCOMYCIN IN THE [facility]ONE [facility] AS THE PATIENT'S LAST CREATININE WAS 3.5. DR WAY CONSULTED WITH THE REP FROM [employer]. THE REP INDICATED THAT IT WOULD NOT SET PROPERLY WITH A DIFFERENT ANTIBIOTIC. DR WAY MENTIONED SINCE IT WAS NOT BEING INJECTED INTO A VEIN IT WOULD NOT BE AS DANGEROUS FOR THE PATIENT.

## 2025-01-30 NOTE — BRIEF OP NOTE
Brief Postoperative Note      Patient: Prateek Denton II  YOB: 1960  MRN: [id number]    Date of Procedure: 1/30/2025    Pre-Op Diagnosis Codes:      * Acute osteomyelitis of right foot [M86.171]  Diabetic foot infection, right foot  Infected Deep hardware, right leg  Achilles rupture, right leg    Post-Op Diagnosis: Same       Procedures:  Fillet of toe, right great toe  Incision and Drainage of bone, Right calcaneus    Surgeon(s):  Trey Del Valle DPM    Assistant:  Resident: Dk Jack DPM; Nathaly Thompson DPM    Anesthesia: Monitor Anesthesia Care    Estimated Blood Loss (mL): less than 50     Complications: None    Specimens:   ID Type Source Tests Collected by Time Destination   1 : RIGHT CALCANEUS SWAB Swab Foot CULTURE, ANAEROBIC AND AEROBIC Trey Del Valle DPM 1/30/2025 1548    A : RIGHT GREAT TOE Tissue Toe SURGICAL PATHOLOGY Trey Del Valle DPM 1/30/2025 1530        Implants:  Implant Name Type Inv. Item Serial No.  Lot No. LRB No. Used Action   GRAFT BNE 10 CC VOID FILL CERAMENT - [id number]  GRAFT BNE 10 CC VOID FILL CERAMENT  [employer]  Right 1 Implanted         Drains: * No LDAs found *    Findings:  Infection Present At Time Of Surgery (PATOS) (choose all levels that have infection present):  - Deep Infection (muscle/fascia) present as evidenced by phlegmon  Other Findings: Fillet amputation of right great toe.  Removal of deep implanted hardware, debridement of right heel wound.  Underlying bone of the calcaneus was hard, intact.  Implanted 10 cc of antibiotic cement.  Closed using Monocryl, nylon, Prolene.    Patient stable for discharge from our standpoint.  No further surgical intervention is needed during this admission.  Patient will require home nursing, and they will plan for dressing change this coming Monday.  Patient will follow-up with Dr. Del Valle within the next 10 days.  Please keep the heel offloaded at all

## 2025-01-30 NOTE — PROGRESS NOTES
Physical Therapy  DATE: 2025    NAME: Prateek Denton II  MRN: [id number]   : 1960    Patient not seen this date for Physical Therapy due to:      [] Cancel by RN or physician due to:    [x] Plan form OR this PM    [] Critical Lab Value Level     [] Blood transfusion in progress    [] Acute or unstable cardiovascular status   _MAP < 55 or more than >115  _HR < 40 or > 130    [] Acute or unstable pulmonary status   -FiO2 > 60%   _RR < 5 or >40    _O2 sats < 85%    [] Strict Bedrest    [] Off Unit for surgery or procedure    [] Off Unit for testing       [] Pending imaging to R/O fracture    [] Refusal by Patient      [] Other      [] PT being discontinued at this time. Patient independent. No further needs.     [] PT being discontinued at this time as the patient has been transferred to hospice care. No further needs.      JAIME GORE, PT

## 2025-01-30 NOTE — PLAN OF CARE
Surgery today, right great toe amp, and I&D of the right heel.   Discharge planning tomorrow     Problem: Discharge Planning  Goal: Discharge to home or other facility with appropriate resources  Outcome: Progressing     Problem: Pain  Goal: Verbalizes/displays adequate comfort level or baseline comfort level  Outcome: Progressing     Problem: Safety - Adult  Goal: Free from fall injury  Outcome: Progressing     Problem: Skin/Tissue Integrity - Adult  Goal: Incisions, wounds, or drain sites healing without S/S of infection  Outcome: Progressing

## 2025-01-30 NOTE — PROGRESS NOTES
[facility]  Office: [phone]  Aung Luis DO, Mansoor Cline DO, Josse Han DO, Gregory Archibald DO, Andrew Roberts MD, Mayra Medel MD, Ray Johnson MD, Romy Amador MD,  Mic Meraz MD, Rom Hayes MD, Casandra Washington MD,  Ely Burks DO, Dorie Anderson MD, Marcos Travis MD, Trey Luis DO, Delia Clemens MD,  Jorge Major DO, Gisel Herbert MD, Rose Yi MD, Celia Guillen MD, Matthew Hyatt MD,  Sammy Galvez MD, Mary Kay Karimi MD, Edson Hay MD, Mateo Turner MD, Stan Cline MD, Radha Hoskins MD, Mikael Cm DO, Eran Mckenzie MD, Ely Blackman MD, Mohsin Reza, MD, Shirley Waterhouse, CNP,  Missy Chowdhury CNP, Mikael Cai, CNP,  Shantelle Monson, DNP, Betty Boles, CNP, Yany Garcia, CNP, Cathie Law, CNP, Lynda Russell, CNP, An King, PA-C, Zoraida Sherman PA-C, Gabby Vaughn, CNP, Saray Patton, CNP,  Lacey Wilkinson, CNP, Nidhi Sinha, CNP, Jaclyn Neff, CNP,  Patsy Peterson, CNS, Zulay Ramsay, CNP, Martha Prince, CNP,   Mariela Benedict, CNP         [facility]   IN-PATIENT SERVICE   [facility] [place]    Progress Note    1/30/2025    8:03 AM    Name:   Prateek Denton II  MRN:     [id number]     Acct:      [id number]   Room:   2011/2011-02  IP Day:  9  Admit Date:  1/21/2025  6:27 PM    PCP:   Lisseth Coello, TORREY - CNP  Code Status:  Full Code    Subjective:     C/C:   Chief Complaint   Patient presents with    Leg Swelling    Knee Pain    Leg Pain    Cough     Interval History Status: improved.     Patient is resting in bed, has no acute complaints today.  Denies any chest pain, shortness of breath, nausea or vomiting, fevers or chills.  Scheduled for surgery today with podiatry    Brief History:     This is a 65-year-old male with longstanding diabetes that presents with a 2-week history of right heel ulcer as well as great toe ulcer. Wounds have been worsening with increasing pain and subsequently

## 2025-01-31 VITALS
SYSTOLIC BLOOD PRESSURE: 133 MMHG | WEIGHT: 277.2 LBS | HEART RATE: 72 BPM | HEIGHT: 71 IN | TEMPERATURE: 98.1 F | BODY MASS INDEX: 38.81 KG/M2 | RESPIRATION RATE: 16 BRPM | DIASTOLIC BLOOD PRESSURE: 78 MMHG | OXYGEN SATURATION: 95 %

## 2025-01-31 LAB
ANION GAP SERPL CALCULATED.3IONS-SCNC: 11 MMOL/L (ref 9–16)
ATYPICAL LYMPHOCYTE ABSOLUTE COUNT: 0.31 K/UL
ATYPICAL LYMPHOCYTES: 3 %
BASOPHILS # BLD: 0.1 K/UL (ref 0–0.2)
BASOPHILS NFR BLD: 1 %
BUN SERPL-MCNC: 39 MG/DL (ref 8–23)
CALCIUM SERPL-MCNC: 8.8 MG/DL (ref 8.8–10.2)
CHLORIDE SERPL-SCNC: 106 MMOL/L (ref 98–107)
CO2 SERPL-SCNC: 22 MMOL/L (ref 20–31)
CREAT SERPL-MCNC: 3.4 MG/DL (ref 0.7–1.2)
CRP SERPL HS-MCNC: 37 MG/L (ref 0–5)
EOSINOPHIL # BLD: 0.31 K/UL (ref 0–0.4)
EOSINOPHILS RELATIVE PERCENT: 3 % (ref 1–4)
ERYTHROCYTE [DISTWIDTH] IN BLOOD BY AUTOMATED COUNT: 12.8 % (ref 11.8–14.4)
ERYTHROCYTE [SEDIMENTATION RATE] IN BLOOD BY PHOTOMETRIC METHOD: 51 MM/HR (ref 0–20)
GFR, ESTIMATED: 19 ML/MIN/1.73M2
GLUCOSE BLD-MCNC: 121 MG/DL (ref 75–110)
GLUCOSE BLD-MCNC: 173 MG/DL (ref 75–110)
GLUCOSE BLD-MCNC: 199 MG/DL (ref 75–110)
GLUCOSE SERPL-MCNC: 122 MG/DL (ref 82–115)
HCT VFR BLD AUTO: 30.5 % (ref 40.7–50.3)
HGB BLD-MCNC: 10.2 G/DL (ref 13–17)
IMM GRANULOCYTES # BLD AUTO: 0.21 K/UL (ref 0–0.3)
IMM GRANULOCYTES NFR BLD: 2 %
LYMPHOCYTES NFR BLD: 1.56 K/UL (ref 1–4.8)
LYMPHOCYTES RELATIVE PERCENT: 15 % (ref 24–44)
MCH RBC QN AUTO: 29.1 PG (ref 25.2–33.5)
MCHC RBC AUTO-ENTMCNC: 33.4 G/DL (ref 28.4–34.8)
MCV RBC AUTO: 87.1 FL (ref 82.6–102.9)
MONOCYTES NFR BLD: 1.14 K/UL (ref 0.2–0.8)
MONOCYTES NFR BLD: 11 % (ref 1–7)
NEUTROPHILS NFR BLD: 65 % (ref 36–66)
NEUTS SEG NFR BLD: 6.77 K/UL (ref 1.8–7.7)
NRBC BLD-RTO: 0 PER 100 WBC
PLATELET # BLD AUTO: 223 K/UL (ref 138–453)
PMV BLD AUTO: 9.8 FL (ref 8.1–13.5)
POTASSIUM SERPL-SCNC: 4.4 MMOL/L (ref 3.7–5.3)
RBC # BLD AUTO: 3.5 M/UL (ref 4.21–5.77)
SODIUM SERPL-SCNC: 139 MMOL/L (ref 136–145)
WBC OTHER # BLD: 10.4 K/UL (ref 3.5–11.3)

## 2025-01-31 PROCEDURE — 6360000002 HC RX W HCPCS

## 2025-01-31 PROCEDURE — 86140 C-REACTIVE PROTEIN: CPT

## 2025-01-31 PROCEDURE — 97530 THERAPEUTIC ACTIVITIES: CPT

## 2025-01-31 PROCEDURE — 2580000003 HC RX 258: Performed by: INTERNAL MEDICINE

## 2025-01-31 PROCEDURE — 6370000000 HC RX 637 (ALT 250 FOR IP)

## 2025-01-31 PROCEDURE — 97535 SELF CARE MNGMENT TRAINING: CPT

## 2025-01-31 PROCEDURE — 82947 ASSAY GLUCOSE BLOOD QUANT: CPT

## 2025-01-31 PROCEDURE — 85652 RBC SED RATE AUTOMATED: CPT

## 2025-01-31 PROCEDURE — 80048 BASIC METABOLIC PNL TOTAL CA: CPT

## 2025-01-31 PROCEDURE — 2500000003 HC RX 250 WO HCPCS

## 2025-01-31 PROCEDURE — 2580000003 HC RX 258

## 2025-01-31 PROCEDURE — 97116 GAIT TRAINING THERAPY: CPT

## 2025-01-31 PROCEDURE — 99239 HOSP IP/OBS DSCHRG MGMT >30: CPT | Performed by: INTERNAL MEDICINE

## 2025-01-31 PROCEDURE — 6360000002 HC RX W HCPCS: Performed by: INTERNAL MEDICINE

## 2025-01-31 PROCEDURE — 85025 COMPLETE CBC W/AUTO DIFF WBC: CPT

## 2025-01-31 PROCEDURE — 99233 SBSQ HOSP IP/OBS HIGH 50: CPT | Performed by: INTERNAL MEDICINE

## 2025-01-31 PROCEDURE — 36415 COLL VENOUS BLD VENIPUNCTURE: CPT

## 2025-01-31 RX ORDER — OXYCODONE HYDROCHLORIDE 5 MG/1
5 TABLET ORAL EVERY 4 HOURS PRN
Qty: 30 TABLET | Refills: 0 | Status: SHIPPED | OUTPATIENT
Start: 2025-01-31 | End: 2025-02-05

## 2025-01-31 RX ORDER — SODIUM BICARBONATE 650 MG/1
650 TABLET ORAL 3 TIMES DAILY
Qty: 90 TABLET | Refills: 0 | Status: SHIPPED | OUTPATIENT
Start: 2025-01-31 | End: 2025-03-02

## 2025-01-31 RX ADMIN — SODIUM BICARBONATE 650 MG: 650 TABLET ORAL at 08:33

## 2025-01-31 RX ADMIN — CEFEPIME 1000 MG: 1 INJECTION, POWDER, FOR SOLUTION INTRAMUSCULAR; INTRAVENOUS at 18:07

## 2025-01-31 RX ADMIN — DILTIAZEM HYDROCHLORIDE 60 MG: 60 TABLET ORAL at 08:33

## 2025-01-31 RX ADMIN — SODIUM BICARBONATE 650 MG: 650 TABLET ORAL at 14:14

## 2025-01-31 RX ADMIN — METOPROLOL 100 MG: 100 TABLET ORAL at 08:33

## 2025-01-31 RX ADMIN — SODIUM CHLORIDE, PRESERVATIVE FREE 10 ML: 5 INJECTION INTRAVENOUS at 08:40

## 2025-01-31 RX ADMIN — CEFEPIME 1000 MG: 1 INJECTION, POWDER, FOR SOLUTION INTRAMUSCULAR; INTRAVENOUS at 08:36

## 2025-01-31 RX ADMIN — HEPARIN SODIUM 5000 UNITS: 5000 INJECTION INTRAVENOUS; SUBCUTANEOUS at 14:14

## 2025-01-31 RX ADMIN — HEPARIN SODIUM 5000 UNITS: 5000 INJECTION INTRAVENOUS; SUBCUTANEOUS at 05:49

## 2025-01-31 RX ADMIN — DULOXETINE 30 MG: 30 CAPSULE, DELAYED RELEASE ORAL at 08:33

## 2025-01-31 RX ADMIN — OXYCODONE HYDROCHLORIDE 10 MG: 5 TABLET ORAL at 10:35

## 2025-01-31 RX ADMIN — INSULIN LISPRO 2 UNITS: 100 INJECTION, SOLUTION INTRAVENOUS; SUBCUTANEOUS at 12:34

## 2025-01-31 RX ADMIN — SODIUM ZIRCONIUM CYCLOSILICATE 10 G: 10 POWDER, FOR SUSPENSION ORAL at 10:31

## 2025-01-31 RX ADMIN — SODIUM CHLORIDE, PRESERVATIVE FREE 10 ML: 5 INJECTION INTRAVENOUS at 08:37

## 2025-01-31 RX ADMIN — HYDROXYZINE HYDROCHLORIDE 50 MG: 25 TABLET ORAL at 08:33

## 2025-01-31 ASSESSMENT — PAIN - FUNCTIONAL ASSESSMENT: PAIN_FUNCTIONAL_ASSESSMENT: PREVENTS OR INTERFERES SOME ACTIVE ACTIVITIES AND ADLS

## 2025-01-31 ASSESSMENT — ENCOUNTER SYMPTOMS
RESPIRATORY NEGATIVE: 1
GASTROINTESTINAL NEGATIVE: 1

## 2025-01-31 ASSESSMENT — PAIN DESCRIPTION - ORIENTATION: ORIENTATION: RIGHT

## 2025-01-31 ASSESSMENT — PAIN DESCRIPTION - LOCATION: LOCATION: FOOT

## 2025-01-31 ASSESSMENT — PAIN DESCRIPTION - DESCRIPTORS: DESCRIPTORS: THROBBING

## 2025-01-31 ASSESSMENT — PAIN SCALES - GENERAL: PAINLEVEL_OUTOF10: 8

## 2025-01-31 NOTE — OP NOTE
Operative Note      Patient: Prateek Denton II  YOB: 1960  MRN: [id number]    Date of Procedure: 1/30/2025    Pre-Op Diagnosis Codes:      * Acute osteomyelitis of right foot [M86.171]  Diabetic foot infection, right foot  Infected Deep hardware, right leg  Achilles rupture, right leg    Post-Op Diagnosis: Same       Procedures:  Fillet of toe, right great toe  Incision and Drainage of bone, Right calcaneus    Surgeon(s):  Trey Del Valle DPM    Assistant:   Resident: Dk Jack DPM; Nathaly Thompson DPM    Anesthesia: Monitor Anesthesia Care    Estimated Blood Loss (mL): less than 50     Complications: None    Specimens:   ID Type Source Tests Collected by Time Destination   1 : RIGHT CALCANEUS SWAB Swab Foot CULTURE, ANAEROBIC AND AEROBIC Trey Del Valle DPM 1/30/2025 1548    A : RIGHT GREAT TOE Tissue Toe SURGICAL PATHOLOGY Trey Del Valle DPM 1/30/2025 1530        Implants:  Implant Name Type Inv. Item Serial No.  Lot No. LRB No. Used Action   GRAFT BNE 10 CC VOID FILL CERAMENT - [id number]  GRAFT BNE 10 CC VOID FILL CERAMENT  [employer] [id number] Implanted         Drains: * No LDAs found *    Findings:  Infection Present At Time Of Surgery (PATOS) (choose all levels that have infection present):  - Deep Infection (muscle/fascia) present as evidenced by purulent fluid  Other Findings: Fillet amputation of right great toe. Removal of deep implanted hardware, debridement of right heel wound. Underlying bone of the calcaneus was hard, intact. Implanted 10 cc of antibiotic cement. Closed using Monocryl, nylon, Prolene.     Detailed Description of Procedure:     This patient is a 65-year-old male with history of type 2 diabetes mellitus with diabetic foot infection.  The patient has had an infection of his right great toe for some time.  Patient has also subsequently had wound to the posterior aspect of his right heel overlying his Achilles

## 2025-01-31 NOTE — PROGRESS NOTES
[facility]    Shaukat Rashid MD Zafar Magsi MD Danial Rashid MD John O. Ranker MD Sembria Ligibel, BRIT       Nephrology Consultation Note    Reason for Consult:    Date of Service: 01/31/25   PCP: Lisseth Coello, TORREY - CNP       Reason for Consult     Acute kidney injury  Hyperkalemia    Assessment and Plan     65 years old male patient with a past medical history significant for hypertension hyperlipidemia diabetes diabetic nephropathy status post left nephrectomy, presented with a chief complaints of right toe osteomyelitis.,  Apparently 2 weeks ago he noticed an ulcer on the right heel and bottom of the right great toe, despite local wound care, wound became worse, x-ray of the right foot shows osteomyelitis, patient was admitted to hospital medicine, podiatry consulted.  Nephrology was consulted for acute kidney injury and hyperkalemia.    Assessment  Acute on chronic kidney disease, baseline creatinine around 3 -3.5, GFR 20-24.   Most likely prerenal component and possible early ATN, renal ultrasound negative for obstruction  History of left nephrectomy  Hyperkalemia  Metabolic acidosis  Volume: Appears euvolemic  Hypertension: Stable  Hemodynamics: echo showing normal ejection fraction  Anemia of CKD  CKD MBD  Osteomyelitis of distal first phalanx  Diabetes mellitus type 2, last hemoglobin A1c 6.2 from October 2024  Atrial fibrillation  Hyperlipidemia  Obstructive sleep apnea  Anxiety depression.    Plan  Renal function stable, potassium staying in the normal range, continue Lokelma on a once daily  Continue sodium bicarbonate by mouth 650 mg 3 times a day  Avoid nephrotoxins, hemodynamic instability and hypotension  Avoid contrast studies unless absolutely necessary  Okay to discharge from nephrology standpoint, patient to follow-up with me in the clinic in about 2 weeks    Thank you for this consultation, we will follow along with you regarding care of this

## 2025-01-31 NOTE — DISCHARGE INSTR - COC
(dry) 01/31/25 0830   Odor None 01/31/25 0830   Deepali-incision Assessment Other (Comment) 01/31/25 0830   Number of days: 1        Elimination:  Continence:   Bowel: Yes  Bladder: Yes  Urinary Catheter: None   Colostomy/Ileostomy/Ileal Conduit: No       Date of Last BM: 1/30/25    Intake/Output Summary (Last 24 hours) at 1/31/2025 1149  Last data filed at 1/30/2025 1803  Gross per 24 hour   Intake 773.51 ml   Output 350 ml   Net 423.51 ml     I/O last 3 completed shifts:  In: 1065.8 [I.V.:980; IV Piggyback:85.8]  Out: 2150 [Urine:2100; Blood:50]    Safety Concerns:     At Risk for Falls    Impairments/Disabilities:      None    Nutrition Therapy:  Current Nutrition Therapy:   - Oral Diet:  Carb Control 4 carbs/meal (1800kcals/day)    Routes of Feeding: Oral  Liquids: Thin Liquids  Daily Fluid Restriction: no  Last Modified Barium Swallow with Video (Video Swallowing Test): not done    Treatments at the Time of Hospital Discharge:   Respiratory Treatments: N/A  Oxygen Therapy:  is not on home oxygen therapy.  Ventilator:    - No ventilator support  - CPAP   only when sleeping    Rehab Therapies: Physical Therapy and Occupational Therapy  Weight Bearing Status/Restrictions: Non-weight bearing on right leg  Other Medical Equipment (for information only, NOT a DME order):  walker  Other Treatments: skilled RN assessment  Dressing changes  IV antibiotics- Patient and wife teachable.    Patient's personal belongings (please select all that are sent with patient):  None    RN SIGNATURE:  Electronically signed by Irais Calero RN on 1/31/25 at 1:24 PM EST    CASE MANAGEMENT/SOCIAL WORK SECTION    Inpatient Status Date: ***    Readmission Risk Assessment Score:  [facility] RISK OF UNPLANNED READMISSION 2.0             21.5 Total Score        Discharging to Facility/ Agency   Name: [facility]    Services Available   Home Health Services      Address   [address] [facility] [address]             Contact Information     [phone]   [phone]          / signature: Electronically signed by TRISTAN PALMA RN on 1/31/25 at 12:32 PM EST    PHYSICIAN SECTION    Prognosis: Good    Condition at Discharge: Stable    Rehab Potential (if transferring to Rehab): Good    Recommended Labs or Other Treatments After Discharge:   Cefepime for 14 days through 2/14/2025   The daptomycin will be discontinued at this point in time   Check CBC, BMP, CRP on Mondays  Fax results to: [phone]  FLUSH PICC LINE PER PROTOCOL  PICC LINE CAN BE REMOVED ONCE IV THERAPY IS COMPLETE     Change dressing on Monday 2/3/2025, adaptic, 4x4s, abd pad x2, kerlix, and ACE wrap.  Keep eye on both the posterior heel and the great toe.    Physician Certification: I certify the above information and transfer of Prateek eDnton II  is necessary for the continuing treatment of the diagnosis listed and that he requires Home Care for less 30 days.     Update Admission H&P: No change in H&P    PHYSICIAN SIGNATURE:  Electronically signed by Dixon Freitas MD on 1/31/25 at 11:50 AM EST

## 2025-01-31 NOTE — PROGRESS NOTES
[facility]  Office: [phone]  Aung Luis DO, Mansoor Cline DO, Joses Han DO, Gregory Archibald DO, Andrew Roberts MD, Mayra Medel MD, Ray Johnson MD, Romy Amador MD,  Mic Meraz MD, Rom Hayes MD, Casandra Washington MD,  Ely Burks DO, Dorie Anderson MD, Marcos Travis MD, Trey Luis DO, Delia Clemens MD,  Jorge Major DO, Gisel Herbert MD, Rose Yi MD, Celia Guillen MD, Matthew Hyatt MD,  Sammy Galvez MD, Mary Kay Karimi MD, Edson Hay MD, Mateo Turner MD, Stan Cline MD, Radha Hoskins MD, Mikael Cm DO, Eran Mckenzie MD, Ely Blackman MD, Mohsin Reza, MD, Shirley Waterhouse, CNP,  Missy Chowdhury CNP, Mikael Cai, CNP,  Shantelle Monson, DNP, Betty Boles, CNP, Yany Garcia, CNP, Cathie Law, CNP, Lynda Russell, CNP, An King, PA-C, Zoraida Sherman PA-C, Gabby Vaughn, CNP, Saray Patton, CNP,  Lacey Wilkinson, CNP, Nidhi Sinha, CNP, Jaclyn Neff, CNP,  Patsy Peterson, CNS, Zulay Ramsay, CNP, Martha Prince, CNP,   Mariela Benedict, CNP         [facility]   IN-PATIENT SERVICE   [facility]    Progress Note    1/31/2025    9:25 AM    Name:   Prateek Denton II  MRN:     [id number]     Acct:      [id number]   Room:   2011/2011-02  IP Day:  10  Admit Date:  1/21/2025  6:27 PM    PCP:   Lisseth Coello, TORREY - CNP  Code Status:  Full Code    Subjective:     C/C:   Chief Complaint   Patient presents with    Leg Swelling    Knee Pain    Leg Pain    Cough     Interval History Status: improved.     Patient is resting in bed without acute complaints.  Denies any chest pain, shortness of breath, nausea or vomiting, fevers or chills.    Brief History:     This is a 65-year-old male with longstanding diabetes that presents with a 2-week history of right heel ulcer as well as great toe ulcer. Wounds have been worsening with increasing pain and subsequently presented emergency room last evening. He has been  calcaneus. 2. Full-thickness tear of the Achilles tendon     MRI FOOT RIGHT WO CONTRAST    Result Date: 1/22/2025  Osteomyelitis of the distal aspect of the distal phalanx of the great toe.     XR FOOT RIGHT (MIN 3 VIEWS)    Result Date: 1/21/2025  1.  Acute osteomyelitis of the distal 1st phalanx . 2.  Age-indeterminate deformity of the distal 2nd phalanx.       Physical Examination:        General appearance:  alert, cooperative and no distress  Mental Status:  oriented to person, place and time and normal affect  Lungs:  clear to auscultation bilaterally, normal effort  Heart:  regular rate and rhythm, no murmur  Abdomen:  soft, nontender, nondistended, normal bowel sounds, no masses, hepatomegaly, splenomegaly  Extremities:  no edema, redness, tenderness in the calves  Skin:  no gross lesions, rashes, induration    Assessment:        Hospital Problems             Last Modified POA    * (Principal) Toe osteomyelitis, right 1/21/2025 Yes    Essential hypertension 1/22/2025 Yes    CKD (chronic kidney disease) stage 4, GFR 15-29 ml/min ([facility]) 1/22/2025 Yes    Sleep apnea 1/22/2025 Yes    Type 2 diabetes mellitus with diabetic nephropathy, with long-term current use of insulin ([facility]) 1/22/2025 Yes    Hyperkalemia 1/22/2025 Yes    Elevated serum creatinine 1/22/2025 Yes    Paroxysmal A-fib ([facility]) 1/22/2025 Yes    Secondary hypercoagulable state ([facility]) 1/22/2025 Yes    Diabetic ulcer of right heel associated with diabetes mellitus due to underlying condition, limited to breakdown of skin ([facility]) 1/23/2025 Yes       Plan:        Continue local wound care  Antibiotics per ID  GI and DVT prophylaxis  Insulin scale  PT and OT  Cardiac medications as ordered  Discharge planning pending postop wound care needs as well as IV antibiotic needs    Josse Han DO  1/31/2025  9:25 AM

## 2025-01-31 NOTE — CARE COORDINATION
Discharge planning    Plan is home with [facility] and [facility] for antibiotics. Patient will have evening dose of antibiotic and be discharged home with wife. Start of care is tomorrow with [facility] btn 8-9 am tomorrow. CRSITOFER needs finished with dressing changes added. [facility] will deliver antibiotic this evening.

## 2025-01-31 NOTE — PLAN OF CARE
Problem: Chronic Conditions and Co-morbidities  Goal: Patient's chronic conditions and co-morbidity symptoms are monitored and maintained or improved  Outcome: Progressing  Flowsheets (Taken 1/31/2025 0830)  Care Plan - Patient's Chronic Conditions and Co-Morbidity Symptoms are Monitored and Maintained or Improved: Monitor and assess patient's chronic conditions and comorbid symptoms for stability, deterioration, or improvement     Problem: Discharge Planning  Goal: Discharge to home or other facility with appropriate resources  Outcome: Progressing  Flowsheets (Taken 1/31/2025 0830)  Discharge to home or other facility with appropriate resources:   Identify barriers to discharge with patient and caregiver   Arrange for needed discharge resources and transportation as appropriate   Identify discharge learning needs (meds, wound care, etc)   Refer to discharge planning if patient needs post-hospital services based on physician order or complex needs related to functional status, cognitive ability or social support system     Problem: Pain  Goal: Verbalizes/displays adequate comfort level or baseline comfort level  Outcome: Progressing     Problem: Safety - Adult  Goal: Free from fall injury  Outcome: Progressing     Problem: Skin/Tissue Integrity - Adult  Goal: Skin integrity remains intact  Outcome: Progressing  Flowsheets (Taken 1/31/2025 0830)  Skin Integrity Remains Intact:   Monitor for areas of redness and/or skin breakdown   Assess vascular access sites hourly     Problem: Skin/Tissue Integrity - Adult  Goal: Incisions, wounds, or drain sites healing without S/S of infection  Outcome: Progressing  Flowsheets (Taken 1/31/2025 0830)  Incisions, Wounds, or Drain Sites Healing Without Sign and Symptoms of Infection: TWICE DAILY: Assess and document skin integrity     Problem: Metabolic/Fluid and Electrolytes - Adult  Goal: Electrolytes maintained within normal limits  Outcome: Progressing  Flowsheets (Taken

## 2025-01-31 NOTE — PROGRESS NOTES
Physical Therapy  Facility/Department: [facility] MED SURG  Daily Treatment Note  NAME: Prateek Denton II  : 1960  MRN: [id number]    Date of Service: 2025    Date of Procedure: 2025  Pre-Op Diagnosis Codes:      * Acute osteomyelitis of right foot [M86.171]  Diabetic foot infection, right foot  Infected Deep hardware, right leg  Achilles rupture, right leg  Post-Op Diagnosis: Same  Procedures:  Fillet of toe, right great toe  Incision and Drainage of bone, Right calcaneus  Surgeon(s):  Trey Del Valle DPM  Assistant:  Resident: Dk Jack DPM; Nathaly Thompson DPM  Anesthesia: Monitor Anesthesia Care  Estimated Blood Loss (mL): less than 50   Complications: None    RN Irais reports patient is medically stable for therapy treatment this date.    Chart reviewed prior to treatment and patient is agreeable for therapy.  All lines intact and patient positioned comfortably at end of treatment.  All patient needs addressed prior to ending therapy session.     Discharge Recommendations:  Patient would benefit from continued therapy after discharge   PT Equipment Recommendations  Equipment Needed: Yes  Mobility Devices: Walker, Wheelchair  Walker: Rolling  Wheelchair: Standard  Other: w/c with leg rest;  RW    Patient Diagnosis(es): The primary encounter diagnosis was Diabetic ulcer of right heel associated with diabetes mellitus due to underlying condition, limited to breakdown of skin (HCC). Diagnoses of Osteomyelitis of great toe of right foot, Cardiomyopathy, unspecified type (HCC), and Acute osteomyelitis of right foot were also pertinent to this visit.    Assessment  Assessment: 64 y/o male referred to PT s/p sx.  Patient is NWBing on R LE. Patient required min-mod A with ascend/descend one platform step using RW. Patient expressing his handrail is not stable at home. Educated patient on benefits of rehab vs home.  Patient stating his spouse will be home to assist PRN and she is in good

## 2025-01-31 NOTE — PROGRESS NOTES
Progress Note  Foot and Ankle Surgery    CC/Reason for consult:   Osteomyelitis, 1st digit  Osteomyelitis, direct extension, calcaneus right  Achilles tendon rupture, right leg  Abscess, achilles tendon, right leg    S/p incision and drainage of calcaneus and fillet amputation of great toe on the right foot on 1/30/2025      Interval history:  -Patient seen and evaluated at bedside.  -No pain reported by patient.  He did take some Percocet last night, patient states that he is pain has been otherwise controlled.  - No acute pedal complaints noted.  Patient states that he is ready to be discharged.  - Patient understands and make steps of his care.  Long discussion was had at bedside.    Updated Labs:     WBC:   Lab Results   Component Value Date    WBC 10.4 01/31/2025     ESR:  Lab Results   Component Value Date    SEDRATE 51 (H) 01/31/2025     CRP:  Lab Results   Component Value Date    CRP 37.0 (H) 01/31/2025       HPI:   Prateek Denton II is a 65 y.o. male diabetic patient seen at [facility] for foot wounds to the right lower extremity.  The patient presented to the ED on 1/21/2025 with a chief complaint of leg swelling, knee pain, leg pain and cough.  He was then admitted to medicine for management of osteomyelitis of the right hallux.  Patient states that approximately 2 weeks ago he noticed an ulcer on his right heel and odor noticed a wound to the right hallux about a month ago.  Patient states he has previously seen Dr. Del Valle for routine foot care.  Patient has a history of diabetic foot ulcers and was previously treating them at home with Medihoney.  Patient states the wounds progressively became worse and have recently become malodorous.  Patient reports local pain that he rates a 6 out of 10.  He describes the pain as constant and dull.  Patient denies any recent injury or trauma.  Patient denies constitutional symptoms at this time but states a few weeks ago he was experiencing nausea and chills.   Patient's labs in the ED were significant for CRP of 56.7, ESR 46 and WBC of 11.4.  Patient was afebrile upon presentation.  Patient has a past medical history significant for CKD with a unilateral kidney as of 2020, diabetes mellitus type 2, paroxysmal A-fib not on blood thinning medication due to previous GI bleed, large bowel obstruction status post colostomy with reversal, chronic back pain and headaches, HTN, HLD, obstructive LISA.  Patient's most recent hemoglobin A1c was 6.2% on 10/10/2024.  Patient states he has no known issues with peripheral blood flow and has not had any vascular intervention to his knowledge.  Patient states he did have an Achilles tendon repair in 2010 at an outside facility.  Patient is a community ambulator and walks unassisted at baseline. Podiatry was consulted for further evaluation.     ROS: Denies N/V/F/C/SOB/CP.  Otherwise negative except at stated in the HPI in consultation note.   Vitals:  Vitals:    01/31/25 0734   BP: 138/79   Pulse: 71   Resp:    Temp: 97.3 °F (36.3 °C)   SpO2: 95%     I/O last 3 completed shifts:  In: 1065.8 [I.V.:980; IV Piggyback:85.8]  Out: 2150 [Urine:2100; Blood:50]  Labs:  Recent Labs     01/31/25  0536   WBC 10.4   HGB 10.2*   HCT 30.5*         K 4.4   BUN 39*   CREATININE 3.4*   GLUCOSE 122*   SEDRATE 51*   CRP 37.0*      CBC:  Recent Labs     01/30/25  0646 01/31/25  0536   WBC 9.6 10.4   HGB 10.2* 10.2*   HCT 30.9* 30.5*    223   CRP 35.8* 37.0*        BMP:  Recent Labs     01/29/25  0630 01/30/25  0646 01/31/25  0536    138 139   K 4.6 3.8 4.4    104 106   CO2 23 23 22   BUN 42* 41* 39*   CREATININE 3.6* 3.5* 3.4*   GLUCOSE 100 108 122*   CALCIUM 9.2 8.9 8.8      Coags:  No results for input(s): \"APTT\", \"INR\" in the last 72 hours.    Invalid input(s): \"PROT\"  Lab Results   Component Value Date    SEDRATE 51 (H) 01/31/2025     Recent Labs     01/30/25  0646 01/31/25  0536   CRP 35.8* 37.0*         PE:   Gen: Alert

## 2025-01-31 NOTE — PLAN OF CARE
Problem: Chronic Conditions and Co-morbidities  Goal: Patient's chronic conditions and co-morbidity symptoms are monitored and maintained or improved  Outcome: Progressing  Flowsheets (Taken 1/30/2025 2203)  Care Plan - Patient's Chronic Conditions and Co-Morbidity Symptoms are Monitored and Maintained or Improved:   Monitor and assess patient's chronic conditions and comorbid symptoms for stability, deterioration, or improvement   Collaborate with multidisciplinary team to address chronic and comorbid conditions and prevent exacerbation or deterioration     Problem: Discharge Planning  Goal: Discharge to home or other facility with appropriate resources  Outcome: Progressing  Flowsheets (Taken 1/30/2025 2203)  Discharge to home or other facility with appropriate resources:   Identify barriers to discharge with patient and caregiver   Arrange for needed discharge resources and transportation as appropriate   Identify discharge learning needs (meds, wound care, etc)     Problem: Pain  Goal: Verbalizes/displays adequate comfort level or baseline comfort level  Outcome: Progressing     Problem: Safety - Adult  Goal: Free from fall injury  Outcome: Progressing     Problem: Skin/Tissue Integrity - Adult  Goal: Skin integrity remains intact  Outcome: Progressing  Flowsheets (Taken 1/30/2025 2313)  Skin Integrity Remains Intact:   Monitor for areas of redness and/or skin breakdown   Assess vascular access sites hourly  Goal: Incisions, wounds, or drain sites healing without S/S of infection  Outcome: Progressing  Flowsheets (Taken 1/30/2025 2313)  Incisions, Wounds, or Drain Sites Healing Without Sign and Symptoms of Infection: TWICE DAILY: Assess and document dressing/incision, wound bed, drain sites and surrounding tissue     Problem: Metabolic/Fluid and Electrolytes - Adult  Goal: Electrolytes maintained within normal limits  Outcome: Progressing  Flowsheets (Taken 1/30/2025 2203)  Electrolytes maintained within

## 2025-01-31 NOTE — PROGRESS NOTES
Occupational Therapy  Facility/Department: [facility]  Daily Treatment Note  NAME: Prateek Denton II  : 1960  MRN: [id number]    Date of Service: 2025    Discharge Recommendations:  Patient would benefit from continued therapy after discharge  OT Equipment Recommendations  Equipment Needed: Yes  Mobility Devices: Walker  Walker: Rolling  ADL Assistive Devices: Emergency Alert System    Due to recent hospitalization and medical condition, pt would benefit from additional intermittent skilled therapy at time of discharge.  Please refer to the AM-PAC score for current functional status.     RN reports patient is medically stable for therapy treatment this date.    Chart reviewed prior to treatment and patient is agreeable for therapy.  All lines intact and patient positioned comfortably at end of treatment.  All patient needs addressed prior to ending therapy session.      Date of Procedure: 2025  Pre-Op Diagnosis Codes:      * Acute osteomyelitis of right foot [M86.171]  Diabetic foot infection, right foot  Infected Deep hardware, right leg  Achilles rupture, right leg  Post-Op Diagnosis: Same  Procedures:  Fillet of toe, right great toe  Incision and Drainage of bone, Right calcaneus  Surgeon(s):  Trey Del Valle DPM  Assistant:  Resident: Dk Jack DPM; Nathaly Thompson DPM  Anesthesia: Monitor Anesthesia Care  Estimated Blood Loss (mL): less than 50   Complications: None    Patient Diagnosis(es): The primary encounter diagnosis was Diabetic ulcer of right heel associated with diabetes mellitus due to underlying condition, limited to breakdown of skin (HCC). Diagnoses of Osteomyelitis of great toe of right foot, Cardiomyopathy, unspecified type (HCC), and Acute osteomyelitis of right foot were also pertinent to this visit.     Assessment   Assessment: Pt progressing toward goals but had inc difficulty navigating 1 plaform step this date. Pt educated on benefits of using a RW vs a  Footwear: Maximum assistance  Putting On/Taking Off Footwear Skilled Clinical Factors: to doff/flory R PRAFO boot  Functional Mobility Skilled Clinical Factors: Pt demo'd functional mob to platform stair, up.down platform step, and back to bedside with RW. CGA for functional mob and Mod A X2 for platform step. Pt unsteady and had difficulty with maintaining NWB precaution. Mod verbal/tactile cues for RW safety/mgmt, assist with lines, scanning, upright posture, and pacing, all to inc safety/reduce fall risk.  Additional Comments: self-care is limited d/t poor activity tolerance, dec balance, and NWB precaution  Product Used : Chlorhexidine solution        Safety Devices  Type of Devices: Call light within reach;Left in bed;Gait belt;Nurse notified  Restraints  Restraints Initially in Place: No      Patient Education  Education Given To: Patient  Education Provided: Role of Therapy;Plan of Care;Home Exercise Program;ADL Adaptive Strategies;Transfer Training;Energy Conservation;Family Education;Fall Prevention Strategies  Education Provided Comments: OT POC, purpose of OT in acute care, safety in function, benefits of OOB activity, call light/fall prevention. extensive time spent on equipment needed for returning home and overall safety  Education Method: Demonstration;Verbal  Education Outcome: Continued education needed      Goals  Short Term Goals  Time Frame for Short Term Goals: By discharge, pt to demo  Short Term Goal 1: ADL transfers and functional mobility to Mod I with use of AD while maintaining NWB status. (Modified at reassessment by Aiyana Dickens OTR/L)  Short Term Goal 2: UB ADLs Set up and LB ADLs to SBA with use of AD/AE as needed.  Short Term Goal 3: increased B UE strength by 1/2 grade to assist with functional tasks/I with B UE HEP with use of handouts as needed.  Short Term Goal 4: toileting to Mod I with use of AD/grab bars as needed.  Short Term Goal 5: Pt/caregiver to be I with fall

## 2025-01-31 NOTE — DISCHARGE SUMMARY
[facility]  Office: [phone]  Aung Luis DO, Mansoor Cline DO, Josse Han DO, Gregory Archibald DO, Andrew Roberts MD, Mayra Medel MD, Ray Johnson MD, Romy Amador MD,  Mic Meraz MD, Rom Hayes MD, Casandra Washington MD,  Ely Burks DO, Dorie Anderson MD, Marcos Travis MD, Trey Luis DO, Delia Clemens MD,  Jorge Major DO, Gisel Herbert MD, Rose Yi MD, Celia Guillen MD, Matthew Hyatt MD,  Sammy Galvez MD, Mary Kay Karimi MD, Edson Hay MD, Mateo Turner MD, Stan Cline MD, Radha Hoskins MD, Mikael Cm DO, Eran Mckenzie MD, Ely Blackman MD, Mohsin Reza, MD, Shirley Waterhouse, CNP,  Missy Chowdhuyr CNP, Mikael Cai, CNP,  Shantelle Monson, DNP, Betty Boles, CNP, Yany Garcia, CNP, Cathie Law, CNP, Lynda Russell, CNP, An King, PA-C, Zoraida Sherman PA-C, Gabby Vaughn, CNP, Saray Patton, CNP,  Lacey Wilkinson, CNP, Nidhi Sinha, CNP, Jaclyn Neff, CNP,  Patsy Peterson, CNS, Zulay Ramsay, CNP, Martha Prince, CNP,   Mariela Benedict, CNP         [facility]   IN-PATIENT SERVICE   [facility]    Discharge Summary     Patient ID: Prateek Denton II  :  1960   MRN: [id number]     ACCOUNT:  [id number]   Patient's PCP: Lisseth Coello APRN - CNP  Admit Date: 2025   Discharge Date: 2025     Length of Stay: 10  Code Status:  Full Code  Admitting Physician: Josse Han DO  Discharge Physician: Josse J Orlop, DO     Active Discharge Diagnoses:     Hospital Problem Lists:  Principal Problem:    Toe osteomyelitis, right  Active Problems:    Essential hypertension    CKD (chronic kidney disease) stage 4, GFR 15-29 ml/min (HCC)    Sleep apnea    Type 2 diabetes mellitus with diabetic nephropathy, with long-term current use of insulin (HCC)    Hyperkalemia    Elevated serum creatinine    Paroxysmal A-fib (HCC)    Secondary hypercoagulable state (HCC)    Diabetic ulcer of right heel

## 2025-01-31 NOTE — PROGRESS NOTES
[facility]  Progress Note    Prateek Denton II  MRN: [id number]  Date: 1/31/2025  LOS: 10     Reason for F/U :   Right great toe osteomyelitis and right calcaneal osteomyelitis    Impression :   Right great toe distal aspect diabetic foot ulceration with associated osteomyelitis on MRI  Status post right great toe amputation  Right posterior heel diabetic ulceration with calcaneal osteomyelitis on MRI  Status post debridement with bone biopsy  Hyperkalemia  Diabetes mellitus type 2 with peripheral neuropathy  Atrial fibrillation  Acute on chronic kidney disease stage IV  Single right kidney since 2020  Hypertension  Hyperlipidemia    Recommendations:   The patient continues on IV antimicrobial therapy with cefepime and daptomycin   The patient was found to have healthy calcaneal bone but it was sent for biopsy   The wound was debrided and cultures were sent   The prior cultures had no resistant gram-positive organisms so the plan will be to continue cefepime for 14 days through 2/14/2025   The daptomycin will be discontinued at this point in time   Check CBC, BMP, CRP on Mondays  Fax results to: [phone]  FLUSH PICC LINE PER PROTOCOL  PICC LINE CAN BE REMOVED ONCE IV THERAPY IS COMPLETE   We will follow the operative pathology and culture data and make any adjustments if needed    Infection Control Recommendations:   Universal precautions    Discharge Planning:   Estimated Length of IV antimicrobials: To be determined  Patient will need Midline Catheter Insertion/ PICC line Insertion: No  Patient will need: Home IV , Infusion Center,  SNF,  LTAC: Undetermined  Patient willneed outpatient wound care: No    Medical Decision making / Summary of Stay:   Prateek Denton II is a 65 y.o.-year-old male who was initially admitted on 1/21/2025.  Patient has a known history of chronic kidney disease stage IV, left-sided nephrectomy in 2020, diabetes mellitus with peripheral neuropathy, atrial

## 2025-02-01 NOTE — PROGRESS NOTES
Pt discharged to home in stable condition with belongings  Discharge instructions given  \"Meds To Beds\" medication at bedside  Pt denies having any further questions at this time  personal items given to patient at discharge  Patient/family state they have everything they were admitted with.  Dressing change supplies and hibiclens sent home with patient

## 2025-02-02 LAB
MICROORGANISM SPEC CULT: ABNORMAL
MICROORGANISM/AGENT SPEC: ABNORMAL
MICROORGANISM/AGENT SPEC: ABNORMAL
SERVICE CMNT-IMP: ABNORMAL
SPECIMEN DESCRIPTION: ABNORMAL

## 2025-02-03 ENCOUNTER — HOSPITAL ENCOUNTER (OUTPATIENT)
Age: 65
Setting detail: SPECIMEN
Discharge: HOME | End: 2025-02-03
Payer: MEDICARE

## 2025-02-03 LAB
ANION GAP SERPL CALCULATED.3IONS-SCNC: 11 MMOL/L (ref 9–16)
BUN SERPL-MCNC: 41 MG/DL (ref 8–23)
CALCIUM SERPL-MCNC: 9.3 MG/DL (ref 8.8–10.2)
CHLORIDE SERPL-SCNC: 105 MMOL/L (ref 98–107)
CO2 SERPL-SCNC: 23 MMOL/L (ref 20–31)
CREAT SERPL-MCNC: 3.3 MG/DL (ref 0.7–1.2)
CRP SERPL HS-MCNC: 54.9 MG/L (ref 0–5)
ERYTHROCYTE [DISTWIDTH] IN BLOOD BY AUTOMATED COUNT: 13 % (ref 11.8–14.4)
GFR, ESTIMATED: 20 ML/MIN/1.73M2
GLUCOSE SERPL-MCNC: 105 MG/DL (ref 82–115)
HCT VFR BLD AUTO: 30.6 % (ref 40.7–50.3)
HGB BLD-MCNC: 10.1 G/DL (ref 13–17)
MCH RBC QN AUTO: 28.7 PG (ref 25.2–33.5)
MCHC RBC AUTO-ENTMCNC: 33 G/DL (ref 28.4–34.8)
MCV RBC AUTO: 86.9 FL (ref 82.6–102.9)
NRBC BLD-RTO: 0 PER 100 WBC
PLATELET # BLD AUTO: 233 K/UL (ref 138–453)
PMV BLD AUTO: 11 FL (ref 8.1–13.5)
POTASSIUM SERPL-SCNC: 3.7 MMOL/L (ref 3.7–5.3)
RBC # BLD AUTO: 3.52 M/UL (ref 4.21–5.77)
SODIUM SERPL-SCNC: 139 MMOL/L (ref 136–145)
SURGICAL PATHOLOGY REPORT: NORMAL
WBC OTHER # BLD: 9.1 K/UL (ref 3.5–11.3)

## 2025-02-03 PROCEDURE — 86140 C-REACTIVE PROTEIN: CPT

## 2025-02-03 PROCEDURE — 85027 COMPLETE CBC AUTOMATED: CPT

## 2025-02-03 PROCEDURE — 80048 BASIC METABOLIC PNL TOTAL CA: CPT

## 2025-03-27 DIAGNOSIS — J98.01 BRONCHOSPASM: ICD-10-CM

## 2025-03-27 DIAGNOSIS — J40 BRONCHITIS: ICD-10-CM

## 2025-03-27 DIAGNOSIS — R06.02 SOB (SHORTNESS OF BREATH): ICD-10-CM

## 2025-03-27 RX ORDER — ALBUTEROL SULFATE 90 UG/1
2 INHALANT RESPIRATORY (INHALATION) 4 TIMES DAILY PRN
Qty: 6.7 EACH | Refills: 0 | Status: SHIPPED | OUTPATIENT
Start: 2025-03-27

## 2025-03-28 ENCOUNTER — HOSPITAL ENCOUNTER (OUTPATIENT)
Dept: PREADMISSION TESTING | Age: 65
Discharge: HOME | End: 2025-04-01
Payer: COMMERCIAL

## 2025-03-31 ENCOUNTER — APPOINTMENT (OUTPATIENT)
Dept: GENERAL RADIOLOGY | Age: 65
End: 2025-03-31
Attending: PODIATRIST
Payer: COMMERCIAL

## 2025-03-31 ENCOUNTER — ANESTHESIA (OUTPATIENT)
Dept: OPERATING ROOM | Age: 65
End: 2025-03-31
Payer: COMMERCIAL

## 2025-03-31 ENCOUNTER — ANESTHESIA EVENT (OUTPATIENT)
Dept: OPERATING ROOM | Age: 65
End: 2025-03-31
Payer: COMMERCIAL

## 2025-03-31 ENCOUNTER — HOSPITAL ENCOUNTER (OUTPATIENT)
Age: 65
Setting detail: OUTPATIENT SURGERY
Discharge: HOME | End: 2025-03-31
Attending: PODIATRIST | Admitting: PODIATRIST
Payer: COMMERCIAL

## 2025-03-31 VITALS
OXYGEN SATURATION: 97 % | RESPIRATION RATE: 14 BRPM | HEART RATE: 66 BPM | WEIGHT: 268 LBS | BODY MASS INDEX: 37.52 KG/M2 | SYSTOLIC BLOOD PRESSURE: 118 MMHG | DIASTOLIC BLOOD PRESSURE: 87 MMHG | HEIGHT: 71 IN | TEMPERATURE: 98.2 F

## 2025-03-31 DIAGNOSIS — L97.412 ULCER OF RIGHT HEEL, WITH FAT LAYER EXPOSED (HCC): ICD-10-CM

## 2025-03-31 DIAGNOSIS — M25.774 OSTEOPHYTE, RIGHT FOOT: ICD-10-CM

## 2025-03-31 LAB
GLUCOSE BLD-MCNC: 127 MG/DL (ref 75–110)
GLUCOSE BLD-MCNC: 139 MG/DL (ref 75–110)

## 2025-03-31 PROCEDURE — 87075 CULTR BACTERIA EXCEPT BLOOD: CPT

## 2025-03-31 PROCEDURE — 3600000012 HC SURGERY LEVEL 2 ADDTL 15MIN: Performed by: PODIATRIST

## 2025-03-31 PROCEDURE — 2709999900 HC NON-CHARGEABLE SUPPLY: Performed by: PODIATRIST

## 2025-03-31 PROCEDURE — 87205 SMEAR GRAM STAIN: CPT

## 2025-03-31 PROCEDURE — 3700000000 HC ANESTHESIA ATTENDED CARE: Performed by: PODIATRIST

## 2025-03-31 PROCEDURE — 6360000002 HC RX W HCPCS: Performed by: SPECIALIST

## 2025-03-31 PROCEDURE — 88311 DECALCIFY TISSUE: CPT

## 2025-03-31 PROCEDURE — 6360000002 HC RX W HCPCS: Performed by: PODIATRIST

## 2025-03-31 PROCEDURE — 87077 CULTURE AEROBIC IDENTIFY: CPT

## 2025-03-31 PROCEDURE — 73610 X-RAY EXAM OF ANKLE: CPT

## 2025-03-31 PROCEDURE — 87176 TISSUE HOMOGENIZATION CULTR: CPT

## 2025-03-31 PROCEDURE — 3600000002 HC SURGERY LEVEL 2 BASE: Performed by: PODIATRIST

## 2025-03-31 PROCEDURE — 2720000010 HC SURG SUPPLY STERILE: Performed by: PODIATRIST

## 2025-03-31 PROCEDURE — 87185 SC STD ENZYME DETCJ PER NZM: CPT

## 2025-03-31 PROCEDURE — 7100000010 HC PHASE II RECOVERY - FIRST 15 MIN: Performed by: PODIATRIST

## 2025-03-31 PROCEDURE — 2500000003 HC RX 250 WO HCPCS: Performed by: PODIATRIST

## 2025-03-31 PROCEDURE — 88305 TISSUE EXAM BY PATHOLOGIST: CPT

## 2025-03-31 PROCEDURE — 7100000011 HC PHASE II RECOVERY - ADDTL 15 MIN: Performed by: PODIATRIST

## 2025-03-31 PROCEDURE — 87070 CULTURE OTHR SPECIMN AEROBIC: CPT

## 2025-03-31 PROCEDURE — 82947 ASSAY GLUCOSE BLOOD QUANT: CPT

## 2025-03-31 PROCEDURE — 3700000001 HC ADD 15 MINUTES (ANESTHESIA): Performed by: PODIATRIST

## 2025-03-31 PROCEDURE — 87076 CULTURE ANAEROBE IDENT EACH: CPT

## 2025-03-31 PROCEDURE — 87186 SC STD MICRODIL/AGAR DIL: CPT

## 2025-03-31 PROCEDURE — 2580000003 HC RX 258: Performed by: ANESTHESIOLOGY

## 2025-03-31 PROCEDURE — 6360000002 HC RX W HCPCS: Performed by: ANESTHESIOLOGY

## 2025-03-31 RX ORDER — SODIUM BICARBONATE 650 MG/1
TABLET ORAL
COMMUNITY
Start: 2025-01-14

## 2025-03-31 RX ORDER — SODIUM CHLORIDE 9 MG/ML
INJECTION, SOLUTION INTRAVENOUS PRN
Status: DISCONTINUED | OUTPATIENT
Start: 2025-03-31 | End: 2025-03-31 | Stop reason: HOSPADM

## 2025-03-31 RX ORDER — FENTANYL CITRATE 50 UG/ML
INJECTION, SOLUTION INTRAMUSCULAR; INTRAVENOUS
Status: DISCONTINUED | OUTPATIENT
Start: 2025-03-31 | End: 2025-03-31 | Stop reason: SDUPTHER

## 2025-03-31 RX ORDER — SODIUM CHLORIDE 0.9 % (FLUSH) 0.9 %
5-40 SYRINGE (ML) INJECTION PRN
Status: DISCONTINUED | OUTPATIENT
Start: 2025-03-31 | End: 2025-03-31 | Stop reason: HOSPADM

## 2025-03-31 RX ORDER — SODIUM CHLORIDE 9 MG/ML
INJECTION, SOLUTION INTRAVENOUS CONTINUOUS
Status: DISCONTINUED | OUTPATIENT
Start: 2025-03-31 | End: 2025-03-31 | Stop reason: HOSPADM

## 2025-03-31 RX ORDER — HALOPERIDOL 5 MG/ML
1 INJECTION INTRAMUSCULAR
Status: DISCONTINUED | OUTPATIENT
Start: 2025-03-31 | End: 2025-03-31 | Stop reason: HOSPADM

## 2025-03-31 RX ORDER — GLYCOPYRROLATE 0.2 MG/ML
0.2 INJECTION INTRAMUSCULAR; INTRAVENOUS ONCE
Status: COMPLETED | OUTPATIENT
Start: 2025-03-31 | End: 2025-03-31

## 2025-03-31 RX ORDER — SODIUM CHLORIDE, SODIUM LACTATE, POTASSIUM CHLORIDE, CALCIUM CHLORIDE 600; 310; 30; 20 MG/100ML; MG/100ML; MG/100ML; MG/100ML
INJECTION, SOLUTION INTRAVENOUS CONTINUOUS
Status: DISCONTINUED | OUTPATIENT
Start: 2025-03-31 | End: 2025-03-31 | Stop reason: HOSPADM

## 2025-03-31 RX ORDER — MIDAZOLAM HYDROCHLORIDE 1 MG/ML
INJECTION, SOLUTION INTRAMUSCULAR; INTRAVENOUS
Status: DISCONTINUED | OUTPATIENT
Start: 2025-03-31 | End: 2025-03-31 | Stop reason: SDUPTHER

## 2025-03-31 RX ORDER — HYDROMORPHONE HYDROCHLORIDE 1 MG/ML
0.5 INJECTION, SOLUTION INTRAMUSCULAR; INTRAVENOUS; SUBCUTANEOUS EVERY 5 MIN PRN
Status: DISCONTINUED | OUTPATIENT
Start: 2025-03-31 | End: 2025-03-31 | Stop reason: HOSPADM

## 2025-03-31 RX ORDER — FENTANYL CITRATE 50 UG/ML
25 INJECTION, SOLUTION INTRAMUSCULAR; INTRAVENOUS EVERY 5 MIN PRN
Status: DISCONTINUED | OUTPATIENT
Start: 2025-03-31 | End: 2025-03-31 | Stop reason: HOSPADM

## 2025-03-31 RX ORDER — OXYCODONE HYDROCHLORIDE 5 MG/1
5 TABLET ORAL
Status: DISCONTINUED | OUTPATIENT
Start: 2025-03-31 | End: 2025-03-31 | Stop reason: HOSPADM

## 2025-03-31 RX ORDER — PROPOFOL 10 MG/ML
INJECTION, EMULSION INTRAVENOUS
Status: DISCONTINUED | OUTPATIENT
Start: 2025-03-31 | End: 2025-03-31 | Stop reason: SDUPTHER

## 2025-03-31 RX ORDER — SODIUM CHLORIDE 0.9 % (FLUSH) 0.9 %
5-40 SYRINGE (ML) INJECTION EVERY 12 HOURS SCHEDULED
Status: DISCONTINUED | OUTPATIENT
Start: 2025-03-31 | End: 2025-03-31 | Stop reason: HOSPADM

## 2025-03-31 RX ORDER — LIDOCAINE HYDROCHLORIDE 20 MG/ML
INJECTION, SOLUTION EPIDURAL; INFILTRATION; INTRACAUDAL; PERINEURAL
Status: DISCONTINUED | OUTPATIENT
Start: 2025-03-31 | End: 2025-03-31 | Stop reason: SDUPTHER

## 2025-03-31 RX ORDER — NALOXONE HYDROCHLORIDE 0.4 MG/ML
INJECTION, SOLUTION INTRAMUSCULAR; INTRAVENOUS; SUBCUTANEOUS PRN
Status: DISCONTINUED | OUTPATIENT
Start: 2025-03-31 | End: 2025-03-31 | Stop reason: HOSPADM

## 2025-03-31 RX ORDER — LIDOCAINE HYDROCHLORIDE 10 MG/ML
1 INJECTION, SOLUTION EPIDURAL; INFILTRATION; INTRACAUDAL; PERINEURAL
Status: DISCONTINUED | OUTPATIENT
Start: 2025-04-01 | End: 2025-03-31 | Stop reason: HOSPADM

## 2025-03-31 RX ORDER — BUPIVACAINE HYDROCHLORIDE 5 MG/ML
INJECTION, SOLUTION EPIDURAL; INTRACAUDAL; PERINEURAL PRN
Status: DISCONTINUED | OUTPATIENT
Start: 2025-03-31 | End: 2025-03-31 | Stop reason: ALTCHOICE

## 2025-03-31 RX ORDER — CLINDAMYCIN PHOSPHATE 900 MG/50ML
900 INJECTION, SOLUTION INTRAVENOUS ONCE
Status: DISCONTINUED | OUTPATIENT
Start: 2025-03-31 | End: 2025-03-31 | Stop reason: ALTCHOICE

## 2025-03-31 RX ADMIN — PROPOFOL 50 MCG/KG/MIN: 10 INJECTION, EMULSION INTRAVENOUS at 07:45

## 2025-03-31 RX ADMIN — PROPOFOL 50 MG: 10 INJECTION, EMULSION INTRAVENOUS at 07:37

## 2025-03-31 RX ADMIN — MIDAZOLAM 2 MG: 1 INJECTION INTRAMUSCULAR; INTRAVENOUS at 07:30

## 2025-03-31 RX ADMIN — GLYCOPYRROLATE 0.2 MG: 0.2 INJECTION INTRAMUSCULAR; INTRAVENOUS at 09:20

## 2025-03-31 RX ADMIN — FENTANYL CITRATE 25 MCG: 50 INJECTION INTRAMUSCULAR; INTRAVENOUS at 08:23

## 2025-03-31 RX ADMIN — PROPOFOL 50 MG: 10 INJECTION, EMULSION INTRAVENOUS at 07:46

## 2025-03-31 RX ADMIN — LIDOCAINE HYDROCHLORIDE 100 MG: 20 INJECTION, SOLUTION EPIDURAL; INFILTRATION; INTRACAUDAL; PERINEURAL at 07:37

## 2025-03-31 RX ADMIN — WATER 3000 MG: 1 INJECTION INTRAMUSCULAR; INTRAVENOUS; SUBCUTANEOUS at 07:37

## 2025-03-31 RX ADMIN — FENTANYL CITRATE 50 MCG: 50 INJECTION INTRAMUSCULAR; INTRAVENOUS at 07:37

## 2025-03-31 RX ADMIN — SODIUM CHLORIDE, SODIUM LACTATE, POTASSIUM CHLORIDE, AND CALCIUM CHLORIDE: .6; .31; .03; .02 INJECTION, SOLUTION INTRAVENOUS at 06:53

## 2025-03-31 RX ADMIN — FENTANYL CITRATE 25 MCG: 50 INJECTION INTRAMUSCULAR; INTRAVENOUS at 07:55

## 2025-03-31 ASSESSMENT — ENCOUNTER SYMPTOMS
DIARRHEA: 0
COLOR CHANGE: 0
WHEEZING: 0
CONSTIPATION: 0
CHEST TIGHTNESS: 0
SHORTNESS OF BREATH: 0
COUGH: 0
SHORTNESS OF BREATH: 0
NAUSEA: 0
SINUS PRESSURE: 0
SORE THROAT: 0
VOMITING: 0
BLOOD IN STOOL: 0
SINUS PAIN: 0

## 2025-03-31 ASSESSMENT — PAIN - FUNCTIONAL ASSESSMENT
PAIN_FUNCTIONAL_ASSESSMENT: NONE - DENIES PAIN
PAIN_FUNCTIONAL_ASSESSMENT: 0-10

## 2025-03-31 NOTE — ANESTHESIA POSTPROCEDURE EVALUATION
Department of Anesthesiology  Postprocedure Note    Patient: Prateek Denton II  MRN: [id number]  YOB: 1960  Date of evaluation: 3/31/2025    Procedure Summary       Date: 03/31/25 Room / Location: [address]    Anesthesia Start: 0730 Anesthesia Stop: 0835    Procedure: HEEL EXOSTOSIS EXCISION RIGHT (Right) Diagnosis:       Osteophyte, right foot      Ulcer of right heel, with fat layer exposed (HCC)      (Osteophyte, right foot [M25.774])      (Ulcer of right heel, with fat layer exposed (HCC) [L97.412])    Surgeons: Trey Del Valle DPM Responsible Provider: Pawel Gonzales MD    Anesthesia Type: MAC, general, TIVA ASA Status: 4            Anesthesia Type: No value filed.    Destiny Phase I: Destiny Score: 10    Destiny Phase II: Destiny Score: 10    Anesthesia Post Evaluation    Patient location during evaluation: PACU  Patient participation: complete - patient participated  Level of consciousness: awake  Airway patency: patent  Nausea & Vomiting: no nausea  Cardiovascular status: blood pressure returned to baseline  Respiratory status: acceptable  Hydration status: euvolemic  Comments: Multimodal analgesia pain management as indicated by procedure  Multimodal analgesia pain management approach  Pain management: adequate    No notable events documented.

## 2025-03-31 NOTE — OP NOTE
Confirmed correct dose   Reports little more greens than usual     Reports drinking 1 beer a month    Reports drinking a couple sake drinks last night     Denies any other changes     Operative Note      Patient: Prateek Denton II  YOB: 1960  MRN: [id number]    Date of Procedure: 3/31/2025    Pre-Op Diagnosis Codes:      * Osteophyte, right foot [M25.774]     * Ulcer of right heel, with fat layer exposed ([facility]) [L97.412]    Post-Op Diagnosis: Same       Procedures:  Partial Removal of Calcaneus, Right  Excisional Debridement of Wound, Right Ankle  Late Closure of Wound, Right    Surgeon(s):  Trey Del Valle DPM    Assistant:   Resident: Nathaly Thompson DPM    Anesthesia: Monitor Anesthesia Care    Hemostasis: Pneumatic Tourniquet to Right Ankle at 250 mmHg for 37 min.    Estimated Blood Loss (mL): Minimal    Complications: None    Specimens:   ID Type Source Tests Collected by Time Destination   1 : Tissue Right Heel Tissue Foot CULTURE, ANAEROBIC AND AEROBIC Trey Del Valle DPM 3/31/2025 0756    A : Right Heel Bone Tissue Foot SURGICAL PATHOLOGY Trey Del Valle DPM 3/31/2025 0755        Implants:  * No implants in log *      Drains: * No LDAs found *    Findings:  Infection Present At Time Of Surgery (PATOS) (choose all levels that have infection present):  No infection present  Other Findings: See op note    Detailed Description of Procedure:   Patient is a 65-year-old male who is well-known to Dr. Del Valle who is here today for partial removal of calcaneus, with excisional debridement of wound and layered closure of the wound.  Patient was previously admitted in January 2025 due to diabetic foot infection with a posterior heel wound.  While he was admitted to the patient underwent incision and drainage of the calcaneus where cultures were taken and debridement was performed.  Patient was then discharged, and followed up with infectious disease and podiatry for wound care.  At this time we feel it is necessary for the patient to undergo partial removal of the exostoses on the posterior calcaneus alongside excisional debridement of the wound and layered

## 2025-03-31 NOTE — ANESTHESIA PRE PROCEDURE
Department of Anesthesiology  Preprocedure Note       Name:  Prateek Denton II   Age:  65 y.o.  :  1960                                          MRN:  [id number]         Date:  3/31/2025      Surgeon: Surgeon(s):  Trey Del Valle DPM    Procedure: Procedure(s):  HEEL EXOSTOSIS EXCISION RIGHT    Medications prior to admission:   Prior to Admission medications    Medication Sig Start Date End Date Taking? Authorizing Provider   sodium bicarbonate 650 MG tablet TAKE 3 TABLETS BY MOUTH TWICE A DAY 25  Yes Provider, MD Souleymane   Vitamin D (CHOLECALCIFEROL) 25 MCG (1000 UT) TABS tablet Take 1 tablet by mouth daily 10/12/24  Yes Andrew Roberts MD   dilTIAZem (CARDIZEM) 60 MG tablet Take 1 tablet by mouth 2 times daily 10/12/24  Yes Andrew Roberts MD   traZODone (DESYREL) 50 MG tablet TAKE 1 TABLET BY MOUTH EVERY DAY AT NIGHT 24  Yes Lisseth Coello APRN - CNP   pravastatin (PRAVACHOL) 20 MG tablet TAKE 1 TABLET BY MOUTH EVERY DAY 24  Yes Lisseth Coello APRN - CNP   hydrOXYzine HCl (ATARAX) 50 MG tablet TAKE 1 TABLET BY MOUTH EVERY DAY AT NIGHT  Patient taking differently: Take 1 tablet by mouth 2 times daily 24  Yes Lisseth Coello APRN - CNP   DULoxetine (CYMBALTA) 30 MG extended release capsule Take 1 capsule by mouth daily 6/18/24 3/31/25 Yes Lisseth Coello APRN - CNP   metoprolol (LOPRESSOR) 100 MG tablet Take 1 tablet by mouth 2 times daily 11/3/21  Yes ProviderSouleymane MD   albuterol sulfate HFA (PROVENTIL;VENTOLIN;PROAIR) 108 (90 Base) MCG/ACT inhaler INHALE 2 PUFFS INTO THE LUNGS 4 TIMES DAILY AS NEEDED FOR WHEEZING OR SHORTNESS OF BREATH. 3/27/25   Lisseth Coello APRN - CNP   Continuous Glucose Sensor (FREESTYLE RANJIT 3 PLUS SENSOR) MISC Wear continuous for glucose monitoring 24   Lisseth Coello APRN - CNP   ferrous sulfate (FE TABS 325) 325 (65 Fe) MG EC tablet Take 1 tablet by mouth daily  Patient not taking: Reported on 2025 10/12/24

## 2025-03-31 NOTE — DISCHARGE INSTRUCTIONS
Foot and Ankle Surgery Post Operative Instructions:  You have had a surgical procedure on your right foot    Fluids and Diet:  Begin with clear liquids, broth, dry toast, and crackers.  If not nauseated then resume your regular pre-operative diet when you are ready  With your history of diabetes, please lower your intake sugar content food as this will negatively impact surgery.    Medications:  Take your prescriptions as directed  You I received your prescription for pain management from outpatient office.  Please take as directed.  You received nerve block to the foot-this should manage your pain for the first 18hrs post operatively  If your pain is not severe then you may take the non-prescription medication that you normally take for aches and pains ie Tylenol and Ibuprofen (alternating), or if severe pain occurs these will serve as additional medication   You may resume your regularly scheduled medications (unless otherwise directed)  If you have a fracture or a surgery that involves placing hardware (screws, plates, joint replacement), avoid the routine use of NSAIDs for about 6 months if possible (due to a risk of delayed bone healing).  If any side effects or adverse reactions occur, discontinue the medication and contact your doctor.  Review the patient drug information that is provided before you take any medication    Ambulation and Activity:  You are advised to go directly home from the hospital  Use assistance device with either crutches, walker, or knee scooter  We recommend knee scooter if possible, you can also obtain these on [employer] for rather affordable and quickly obtainable.  You may not put weight on the operated foot.  Please keep your posterior splint on, clean, dry, intact at all times.  Avoid stairs.  Do not lift or move heavy objects  Do not drive until cleared by your physician    Bandage and Wound Care Instructions:  Keep bandage clean and dry  Do NOT remove dressing/ splint  DO NOT

## 2025-03-31 NOTE — H&P
06/21/2023    Hernia of abdominal wall     Dr. Doty    History of atrial fibrillation     AFTER SURGERY ON 09/23/2020 WENT INTO A FIB. CONVERTED BACK TO NORMAL RHYTHM ON HIS OWN. CARDIOLOGIST DR STEIN    History of blood transfusion     NO REACTION    [place] (hard of hearing)     HTN     Hyperkalemia     Hyperlipidemia     Incisional hernia     Kidney infection     Large bowel obstruction (HCC) 08/31/2020    Morbid obesity due to excess calories 10/04/2017    Paroxysmal A-fib (HCC) 03/12/2024    Dr. [facility] [address] last visit 4/2024    SBO (small bowel obstruction) (HCC) 04/17/2023    Sepsis (HCC)     post surgery. leak in bowel    Single kidney     Sleep apnea     USES CPAP MACHINE    T2DM 04/29/2024    currently no meds    Tooth missing     RIGHT UPPER 2ND MOLAR    Under care of team 08/2021    Dr. [facility] [address]    Upper GI bleed 06/21/2023    [facility] next appt 4/30/2024    Wears glasses     READING    Wellness examination 07/2021    [address] [place] last appt 4/2024        Past Surgical History:     Past Surgical History:   Procedure Laterality Date    ADENOIDECTOMY      TWICE AS A CHILD    ANKLE SURGERY Right 2010    RUPTURED ACHILES    CARPAL TUNNEL RELEASE Bilateral     COLONOSCOPY N/A 04/18/2023    COLONOSCOPY DIAGNOSTIC performed by Cosme Hunter MD at [facility] OR    COLOSTOMY      temporary    CYSTO/URETERO/PYELOSCOPY, CALCULUS TX Right 10/30/2020    HOLMIUM-STANDBY, CYSTOSCOPY, URETEROSCOPY, STENT EXCHANGE performed by Donal Cano MD at [facility] OR    CYSTOSCOPY Right 09/01/2020    CYSTOSCOPY RIGHT URETERAL STENT INSERTION performed by Jeff Johnson MD at [facility] OR    FOOT DEBRIDEMENT Right 1/30/2025    FILLET OF RIGHT FIRST TOE, I & D BONE RIGHT CALCANEOUS performed by Trey Del Valle DPM at [facility] OR     CATH POWER PICC TRIPLE  09/03/2020    REMOVED AROUND 10/07/2020    INCISIONAL HERNIA REPAIR  05/08/2024    OPEN INCISIONAL HERNIA REPAIR WITH

## 2025-04-02 LAB
MICROORGANISM SPEC CULT: ABNORMAL
MICROORGANISM SPEC CULT: ABNORMAL
MICROORGANISM/AGENT SPEC: ABNORMAL
MICROORGANISM/AGENT SPEC: ABNORMAL
SERVICE CMNT-IMP: ABNORMAL
SPECIMEN DESCRIPTION: ABNORMAL
SURGICAL PATHOLOGY REPORT: NORMAL

## 2025-04-10 ENCOUNTER — HOSPITAL ENCOUNTER (INPATIENT)
Age: 65
LOS: 11 days | Discharge: HOME | DRG: 853 | End: 2025-04-21
Attending: EMERGENCY MEDICINE | Admitting: INTERNAL MEDICINE
Payer: MEDICARE

## 2025-04-10 ENCOUNTER — APPOINTMENT (OUTPATIENT)
Dept: GENERAL RADIOLOGY | Age: 65
DRG: 853 | End: 2025-04-10
Payer: MEDICARE

## 2025-04-10 ENCOUNTER — ANESTHESIA EVENT (OUTPATIENT)
Dept: OPERATING ROOM | Age: 65
End: 2025-04-10
Payer: MEDICARE

## 2025-04-10 ENCOUNTER — ANESTHESIA (OUTPATIENT)
Dept: OPERATING ROOM | Age: 65
End: 2025-04-10
Payer: MEDICARE

## 2025-04-10 ENCOUNTER — APPOINTMENT (OUTPATIENT)
Dept: CT IMAGING | Age: 65
DRG: 853 | End: 2025-04-10
Payer: MEDICARE

## 2025-04-10 DIAGNOSIS — G47.00 INSOMNIA, UNSPECIFIED TYPE: ICD-10-CM

## 2025-04-10 DIAGNOSIS — R65.10 SIRS (SYSTEMIC INFLAMMATORY RESPONSE SYNDROME) (HCC): ICD-10-CM

## 2025-04-10 DIAGNOSIS — E08.621 DIABETIC ULCER OF RIGHT HEEL ASSOCIATED WITH DIABETES MELLITUS DUE TO UNDERLYING CONDITION, LIMITED TO BREAKDOWN OF SKIN (HCC): ICD-10-CM

## 2025-04-10 DIAGNOSIS — A48.0 GAS GANGRENE (HCC): ICD-10-CM

## 2025-04-10 DIAGNOSIS — N17.9 ACUTE KIDNEY INJURY: ICD-10-CM

## 2025-04-10 DIAGNOSIS — I48.92 ATRIAL FLUTTER, UNSPECIFIED TYPE (HCC): ICD-10-CM

## 2025-04-10 DIAGNOSIS — E86.0 DEHYDRATION: ICD-10-CM

## 2025-04-10 DIAGNOSIS — M86.9 OSTEOMYELITIS, UNSPECIFIED SITE, UNSPECIFIED TYPE (HCC): ICD-10-CM

## 2025-04-10 DIAGNOSIS — R11.2 NAUSEA AND VOMITING, UNSPECIFIED VOMITING TYPE: Primary | ICD-10-CM

## 2025-04-10 DIAGNOSIS — L97.411 DIABETIC ULCER OF RIGHT HEEL ASSOCIATED WITH DIABETES MELLITUS DUE TO UNDERLYING CONDITION, LIMITED TO BREAKDOWN OF SKIN (HCC): ICD-10-CM

## 2025-04-10 LAB
ALBUMIN SERPL-MCNC: 3.6 G/DL (ref 3.5–5.2)
ALBUMIN/GLOB SERPL: 0.8 {RATIO} (ref 1–2.5)
ALP SERPL-CCNC: 91 U/L (ref 40–129)
ALT SERPL-CCNC: 27 U/L (ref 10–50)
ANION GAP SERPL CALCULATED.3IONS-SCNC: 16 MMOL/L (ref 9–16)
ANION GAP SERPL CALCULATED.3IONS-SCNC: 20 MMOL/L (ref 9–16)
ANTI-XA UNFRAC HEPARIN: 0.27 IU/L (ref 0.3–0.7)
ANTI-XA UNFRAC HEPARIN: <0.1 IU/L (ref 0.3–0.7)
AST SERPL-CCNC: 25 U/L (ref 10–50)
BASOPHILS # BLD: 0.05 K/UL (ref 0–0.2)
BASOPHILS NFR BLD: 0 % (ref 0–2)
BILIRUB SERPL-MCNC: 0.3 MG/DL (ref 0–1.2)
BILIRUB UR QL STRIP: NEGATIVE
BUN SERPL-MCNC: 100 MG/DL (ref 8–23)
BUN SERPL-MCNC: 108 MG/DL (ref 8–23)
CALCIUM SERPL-MCNC: 8.5 MG/DL (ref 8.8–10.2)
CALCIUM SERPL-MCNC: 9.6 MG/DL (ref 8.8–10.2)
CASTS #/AREA URNS LPF: NORMAL /LPF
CASTS #/AREA URNS LPF: NORMAL /LPF
CHLORIDE SERPL-SCNC: 103 MMOL/L (ref 98–107)
CHLORIDE SERPL-SCNC: 98 MMOL/L (ref 98–107)
CLARITY UR: ABNORMAL
CO2 SERPL-SCNC: 12 MMOL/L (ref 20–31)
CO2 SERPL-SCNC: 12 MMOL/L (ref 20–31)
COLOR UR: YELLOW
CREAT SERPL-MCNC: 4.8 MG/DL (ref 0.7–1.2)
CREAT SERPL-MCNC: 5.1 MG/DL (ref 0.7–1.2)
CRP SERPL HS-MCNC: 9.7 MG/L (ref 0–5)
EOSINOPHIL # BLD: 0.15 K/UL (ref 0–0.44)
EOSINOPHILS RELATIVE PERCENT: 1 % (ref 1–4)
EPI CELLS #/AREA URNS HPF: NORMAL /HPF (ref 0–5)
ERYTHROCYTE [DISTWIDTH] IN BLOOD BY AUTOMATED COUNT: 12.9 % (ref 11.8–14.4)
ERYTHROCYTE [DISTWIDTH] IN BLOOD BY AUTOMATED COUNT: 12.9 % (ref 11.8–14.4)
ERYTHROCYTE [SEDIMENTATION RATE] IN BLOOD BY PHOTOMETRIC METHOD: 35 MM/HR (ref 0–20)
GFR, ESTIMATED: 12 ML/MIN/1.73M2
GFR, ESTIMATED: 13 ML/MIN/1.73M2
GLUCOSE BLD-MCNC: 154 MG/DL (ref 75–110)
GLUCOSE SERPL-MCNC: 150 MG/DL (ref 82–115)
GLUCOSE SERPL-MCNC: 157 MG/DL (ref 82–115)
GLUCOSE UR STRIP-MCNC: ABNORMAL MG/DL
HCT VFR BLD AUTO: 37.5 % (ref 40.7–50.3)
HCT VFR BLD AUTO: 41.8 % (ref 40.7–50.3)
HGB BLD-MCNC: 12.3 G/DL (ref 13–17)
HGB BLD-MCNC: 14.1 G/DL (ref 13–17)
HGB UR QL STRIP.AUTO: ABNORMAL
IMM GRANULOCYTES # BLD AUTO: 0.13 K/UL (ref 0–0.3)
IMM GRANULOCYTES NFR BLD: 1 %
INR PPP: 1.2
KETONES UR STRIP-MCNC: NEGATIVE MG/DL
LACTATE BLDV-SCNC: 1.3 MMOL/L (ref 0.5–1.9)
LACTATE BLDV-SCNC: 1.5 MMOL/L (ref 0.5–1.9)
LEUKOCYTE ESTERASE UR QL STRIP: NEGATIVE
LIPASE SERPL-CCNC: 228 U/L (ref 13–60)
LYMPHOCYTES NFR BLD: 1.81 K/UL (ref 1.1–3.7)
LYMPHOCYTES RELATIVE PERCENT: 13 % (ref 24–43)
MCH RBC QN AUTO: 28.8 PG (ref 25.2–33.5)
MCH RBC QN AUTO: 28.8 PG (ref 25.2–33.5)
MCHC RBC AUTO-ENTMCNC: 32.8 G/DL (ref 28.4–34.8)
MCHC RBC AUTO-ENTMCNC: 33.7 G/DL (ref 28.4–34.8)
MCV RBC AUTO: 85.5 FL (ref 82.6–102.9)
MCV RBC AUTO: 87.8 FL (ref 82.6–102.9)
MONOCYTES NFR BLD: 1.11 K/UL (ref 0.1–1.2)
MONOCYTES NFR BLD: 8 % (ref 3–12)
NEUTROPHILS NFR BLD: 77 % (ref 36–65)
NEUTS SEG NFR BLD: 10.54 K/UL (ref 1.5–8.1)
NITRITE UR QL STRIP: NEGATIVE
NRBC BLD-RTO: 0 PER 100 WBC
NRBC BLD-RTO: 0 PER 100 WBC
PARTIAL THROMBOPLASTIN TIME: 31.7 SEC (ref 23.9–33.8)
PH UR STRIP: 5.5 [PH] (ref 5–8)
PLATELET # BLD AUTO: 277 K/UL (ref 138–453)
PLATELET # BLD AUTO: 339 K/UL (ref 138–453)
PMV BLD AUTO: 10.7 FL (ref 8.1–13.5)
PMV BLD AUTO: 10.8 FL (ref 8.1–13.5)
POTASSIUM SERPL-SCNC: 4.8 MMOL/L (ref 3.7–5.3)
POTASSIUM SERPL-SCNC: 5.7 MMOL/L (ref 3.7–5.3)
PROT SERPL-MCNC: 7.8 G/DL (ref 6.6–8.7)
PROT UR STRIP-MCNC: ABNORMAL MG/DL
PROTHROMBIN TIME: 15.7 SEC (ref 11.5–14.2)
RBC # BLD AUTO: 4.27 M/UL (ref 4.21–5.77)
RBC # BLD AUTO: 4.89 M/UL (ref 4.21–5.77)
RBC #/AREA URNS HPF: NORMAL /HPF (ref 0–2)
SODIUM SERPL-SCNC: 130 MMOL/L (ref 136–145)
SODIUM SERPL-SCNC: 131 MMOL/L (ref 136–145)
SP GR UR STRIP: 1.02 (ref 1–1.03)
TROPONIN I SERPL HS-MCNC: 78 NG/L (ref 0–22)
TROPONIN I SERPL HS-MCNC: 86 NG/L (ref 0–22)
UROBILINOGEN UR STRIP-ACNC: NORMAL EU/DL (ref 0–1)
WBC #/AREA URNS HPF: NORMAL /HPF (ref 0–5)
WBC OTHER # BLD: 13.8 K/UL (ref 3.5–11.3)
WBC OTHER # BLD: 17.2 K/UL (ref 3.5–11.3)

## 2025-04-10 PROCEDURE — 84484 ASSAY OF TROPONIN QUANT: CPT

## 2025-04-10 PROCEDURE — 87205 SMEAR GRAM STAIN: CPT

## 2025-04-10 PROCEDURE — 6360000002 HC RX W HCPCS: Performed by: NURSE PRACTITIONER

## 2025-04-10 PROCEDURE — 96365 THER/PROPH/DIAG IV INF INIT: CPT

## 2025-04-10 PROCEDURE — 6360000002 HC RX W HCPCS

## 2025-04-10 PROCEDURE — 6360000002 HC RX W HCPCS: Performed by: EMERGENCY MEDICINE

## 2025-04-10 PROCEDURE — 2500000003 HC RX 250 WO HCPCS: Performed by: INTERNAL MEDICINE

## 2025-04-10 PROCEDURE — 2500000003 HC RX 250 WO HCPCS: Performed by: EMERGENCY MEDICINE

## 2025-04-10 PROCEDURE — 99285 EMERGENCY DEPT VISIT HI MDM: CPT

## 2025-04-10 PROCEDURE — 74022 RADEX COMPL AQT ABD SERIES: CPT

## 2025-04-10 PROCEDURE — 86140 C-REACTIVE PROTEIN: CPT

## 2025-04-10 PROCEDURE — 87070 CULTURE OTHR SPECIMN AEROBIC: CPT

## 2025-04-10 PROCEDURE — 6370000000 HC RX 637 (ALT 250 FOR IP)

## 2025-04-10 PROCEDURE — 2700000000 HC OXYGEN THERAPY PER DAY

## 2025-04-10 PROCEDURE — 2580000003 HC RX 258: Performed by: EMERGENCY MEDICINE

## 2025-04-10 PROCEDURE — 2060000000 HC ICU INTERMEDIATE R&B

## 2025-04-10 PROCEDURE — 85027 COMPLETE CBC AUTOMATED: CPT

## 2025-04-10 PROCEDURE — 99222 1ST HOSP IP/OBS MODERATE 55: CPT

## 2025-04-10 PROCEDURE — 87186 SC STD MICRODIL/AGAR DIL: CPT

## 2025-04-10 PROCEDURE — 2580000003 HC RX 258: Performed by: NURSE PRACTITIONER

## 2025-04-10 PROCEDURE — 83690 ASSAY OF LIPASE: CPT

## 2025-04-10 PROCEDURE — 85520 HEPARIN ASSAY: CPT

## 2025-04-10 PROCEDURE — 85025 COMPLETE CBC W/AUTO DIFF WBC: CPT

## 2025-04-10 PROCEDURE — 94761 N-INVAS EAR/PLS OXIMETRY MLT: CPT

## 2025-04-10 PROCEDURE — 93005 ELECTROCARDIOGRAM TRACING: CPT | Performed by: EMERGENCY MEDICINE

## 2025-04-10 PROCEDURE — 6370000000 HC RX 637 (ALT 250 FOR IP): Performed by: EMERGENCY MEDICINE

## 2025-04-10 PROCEDURE — 81001 URINALYSIS AUTO W/SCOPE: CPT

## 2025-04-10 PROCEDURE — 80048 BASIC METABOLIC PNL TOTAL CA: CPT

## 2025-04-10 PROCEDURE — 82947 ASSAY GLUCOSE BLOOD QUANT: CPT

## 2025-04-10 PROCEDURE — 87077 CULTURE AEROBIC IDENTIFY: CPT

## 2025-04-10 PROCEDURE — 85610 PROTHROMBIN TIME: CPT

## 2025-04-10 PROCEDURE — 87075 CULTR BACTERIA EXCEPT BLOOD: CPT

## 2025-04-10 PROCEDURE — 2580000003 HC RX 258

## 2025-04-10 PROCEDURE — 36415 COLL VENOUS BLD VENIPUNCTURE: CPT

## 2025-04-10 PROCEDURE — 83605 ASSAY OF LACTIC ACID: CPT

## 2025-04-10 PROCEDURE — 96375 TX/PRO/DX INJ NEW DRUG ADDON: CPT

## 2025-04-10 PROCEDURE — 73630 X-RAY EXAM OF FOOT: CPT

## 2025-04-10 PROCEDURE — 87040 BLOOD CULTURE FOR BACTERIA: CPT

## 2025-04-10 PROCEDURE — 85652 RBC SED RATE AUTOMATED: CPT

## 2025-04-10 PROCEDURE — 73700 CT LOWER EXTREMITY W/O DYE: CPT

## 2025-04-10 PROCEDURE — 85730 THROMBOPLASTIN TIME PARTIAL: CPT

## 2025-04-10 PROCEDURE — 80053 COMPREHEN METABOLIC PANEL: CPT

## 2025-04-10 RX ORDER — ALBUTEROL SULFATE 90 UG/1
2 INHALANT RESPIRATORY (INHALATION) 4 TIMES DAILY PRN
Status: DISCONTINUED | OUTPATIENT
Start: 2025-04-10 | End: 2025-04-10

## 2025-04-10 RX ORDER — SODIUM CHLORIDE 0.9 % (FLUSH) 0.9 %
5-40 SYRINGE (ML) INJECTION EVERY 12 HOURS SCHEDULED
Status: DISCONTINUED | OUTPATIENT
Start: 2025-04-10 | End: 2025-04-21 | Stop reason: HOSPADM

## 2025-04-10 RX ORDER — 0.9 % SODIUM CHLORIDE 0.9 %
1000 INTRAVENOUS SOLUTION INTRAVENOUS ONCE
Status: COMPLETED | OUTPATIENT
Start: 2025-04-10 | End: 2025-04-10

## 2025-04-10 RX ORDER — LIDOCAINE HYDROCHLORIDE 10 MG/ML
20 INJECTION, SOLUTION EPIDURAL; INFILTRATION; INTRACAUDAL; PERINEURAL ONCE
Status: CANCELLED | OUTPATIENT
Start: 2025-04-10 | End: 2025-04-10

## 2025-04-10 RX ORDER — DEXTROSE MONOHYDRATE 100 MG/ML
INJECTION, SOLUTION INTRAVENOUS CONTINUOUS PRN
Status: DISCONTINUED | OUTPATIENT
Start: 2025-04-10 | End: 2025-04-10 | Stop reason: SDUPTHER

## 2025-04-10 RX ORDER — HEPARIN SODIUM 1000 [USP'U]/ML
4000 INJECTION, SOLUTION INTRAVENOUS; SUBCUTANEOUS ONCE
Status: COMPLETED | OUTPATIENT
Start: 2025-04-10 | End: 2025-04-10

## 2025-04-10 RX ORDER — HEPARIN SODIUM 10000 [USP'U]/100ML
5-30 INJECTION, SOLUTION INTRAVENOUS CONTINUOUS
Status: DISCONTINUED | OUTPATIENT
Start: 2025-04-10 | End: 2025-04-12

## 2025-04-10 RX ORDER — PRAVASTATIN SODIUM 20 MG
20 TABLET ORAL DAILY
Status: DISCONTINUED | OUTPATIENT
Start: 2025-04-10 | End: 2025-04-21 | Stop reason: HOSPADM

## 2025-04-10 RX ORDER — HEPARIN SODIUM 5000 [USP'U]/ML
5000 INJECTION, SOLUTION INTRAVENOUS; SUBCUTANEOUS EVERY 8 HOURS SCHEDULED
Status: DISCONTINUED | OUTPATIENT
Start: 2025-04-10 | End: 2025-04-10 | Stop reason: ALTCHOICE

## 2025-04-10 RX ORDER — ONDANSETRON 4 MG/1
4 TABLET, ORALLY DISINTEGRATING ORAL EVERY 8 HOURS PRN
Status: DISCONTINUED | OUTPATIENT
Start: 2025-04-10 | End: 2025-04-21 | Stop reason: HOSPADM

## 2025-04-10 RX ORDER — SODIUM CHLORIDE 9 MG/ML
INJECTION, SOLUTION INTRAVENOUS PRN
Status: DISCONTINUED | OUTPATIENT
Start: 2025-04-10 | End: 2025-04-21 | Stop reason: HOSPADM

## 2025-04-10 RX ORDER — HEPARIN SODIUM 1000 [USP'U]/ML
2000 INJECTION, SOLUTION INTRAVENOUS; SUBCUTANEOUS PRN
Status: DISCONTINUED | OUTPATIENT
Start: 2025-04-10 | End: 2025-04-12

## 2025-04-10 RX ORDER — ONDANSETRON 2 MG/ML
4 INJECTION INTRAMUSCULAR; INTRAVENOUS ONCE
Status: COMPLETED | OUTPATIENT
Start: 2025-04-10 | End: 2025-04-10

## 2025-04-10 RX ORDER — VITAMIN B COMPLEX
1000 TABLET ORAL DAILY
Status: DISCONTINUED | OUTPATIENT
Start: 2025-04-10 | End: 2025-04-21 | Stop reason: HOSPADM

## 2025-04-10 RX ORDER — POLYETHYLENE GLYCOL 3350 17 G/17G
17 POWDER, FOR SOLUTION ORAL DAILY PRN
Status: DISCONTINUED | OUTPATIENT
Start: 2025-04-10 | End: 2025-04-21 | Stop reason: HOSPADM

## 2025-04-10 RX ORDER — SODIUM CHLORIDE 0.9 % (FLUSH) 0.9 %
5-40 SYRINGE (ML) INJECTION PRN
Status: DISCONTINUED | OUTPATIENT
Start: 2025-04-10 | End: 2025-04-21 | Stop reason: HOSPADM

## 2025-04-10 RX ORDER — DILTIAZEM HYDROCHLORIDE 5 MG/ML
0.25 INJECTION INTRAVENOUS ONCE
Status: COMPLETED | OUTPATIENT
Start: 2025-04-10 | End: 2025-04-10

## 2025-04-10 RX ORDER — CLINDAMYCIN PHOSPHATE 900 MG/50ML
900 INJECTION, SOLUTION INTRAVENOUS EVERY 8 HOURS
Status: DISCONTINUED | OUTPATIENT
Start: 2025-04-10 | End: 2025-04-10

## 2025-04-10 RX ORDER — INSULIN LISPRO 100 [IU]/ML
0-8 INJECTION, SOLUTION INTRAVENOUS; SUBCUTANEOUS
Status: DISCONTINUED | OUTPATIENT
Start: 2025-04-10 | End: 2025-04-21 | Stop reason: HOSPADM

## 2025-04-10 RX ORDER — METOPROLOL TARTRATE 100 MG/1
100 TABLET ORAL 2 TIMES DAILY
Status: DISCONTINUED | OUTPATIENT
Start: 2025-04-10 | End: 2025-04-21 | Stop reason: HOSPADM

## 2025-04-10 RX ORDER — ALBUTEROL SULFATE 90 UG/1
2 INHALANT RESPIRATORY (INHALATION) EVERY 6 HOURS PRN
Status: DISCONTINUED | OUTPATIENT
Start: 2025-04-10 | End: 2025-04-21 | Stop reason: HOSPADM

## 2025-04-10 RX ORDER — DICYCLOMINE HYDROCHLORIDE 10 MG/1
20 CAPSULE ORAL ONCE
Status: COMPLETED | OUTPATIENT
Start: 2025-04-10 | End: 2025-04-10

## 2025-04-10 RX ORDER — DULOXETIN HYDROCHLORIDE 30 MG/1
30 CAPSULE, DELAYED RELEASE ORAL DAILY
Status: DISCONTINUED | OUTPATIENT
Start: 2025-04-10 | End: 2025-04-21 | Stop reason: HOSPADM

## 2025-04-10 RX ORDER — HYDROXYZINE HYDROCHLORIDE 25 MG/1
50 TABLET, FILM COATED ORAL 2 TIMES DAILY
Status: DISCONTINUED | OUTPATIENT
Start: 2025-04-10 | End: 2025-04-21 | Stop reason: HOSPADM

## 2025-04-10 RX ORDER — CALCIUM GLUCONATE 20 MG/ML
1000 INJECTION, SOLUTION INTRAVENOUS ONCE
Status: COMPLETED | OUTPATIENT
Start: 2025-04-10 | End: 2025-04-10

## 2025-04-10 RX ORDER — ONDANSETRON 2 MG/ML
INJECTION INTRAMUSCULAR; INTRAVENOUS
Status: COMPLETED
Start: 2025-04-10 | End: 2025-04-10

## 2025-04-10 RX ORDER — ZOLPIDEM TARTRATE 5 MG/1
5 TABLET ORAL NIGHTLY PRN
Status: DISCONTINUED | OUTPATIENT
Start: 2025-04-10 | End: 2025-04-21 | Stop reason: HOSPADM

## 2025-04-10 RX ORDER — ACETAMINOPHEN 650 MG/1
650 SUPPOSITORY RECTAL EVERY 6 HOURS PRN
Status: DISCONTINUED | OUTPATIENT
Start: 2025-04-10 | End: 2025-04-21 | Stop reason: HOSPADM

## 2025-04-10 RX ORDER — SODIUM CHLORIDE, SODIUM LACTATE, POTASSIUM CHLORIDE, AND CALCIUM CHLORIDE .6; .31; .03; .02 G/100ML; G/100ML; G/100ML; G/100ML
30 INJECTION, SOLUTION INTRAVENOUS ONCE
Status: COMPLETED | OUTPATIENT
Start: 2025-04-10 | End: 2025-04-10

## 2025-04-10 RX ORDER — SODIUM CHLORIDE 9 MG/ML
INJECTION, SOLUTION INTRAVENOUS CONTINUOUS
Status: ACTIVE | OUTPATIENT
Start: 2025-04-10 | End: 2025-04-11

## 2025-04-10 RX ORDER — HEPARIN SODIUM 1000 [USP'U]/ML
4000 INJECTION, SOLUTION INTRAVENOUS; SUBCUTANEOUS PRN
Status: DISCONTINUED | OUTPATIENT
Start: 2025-04-10 | End: 2025-04-12

## 2025-04-10 RX ORDER — TRAZODONE HYDROCHLORIDE 50 MG/1
50 TABLET ORAL NIGHTLY
Status: DISCONTINUED | OUTPATIENT
Start: 2025-04-10 | End: 2025-04-21 | Stop reason: HOSPADM

## 2025-04-10 RX ORDER — ACETAMINOPHEN 325 MG/1
650 TABLET ORAL EVERY 6 HOURS PRN
Status: DISCONTINUED | OUTPATIENT
Start: 2025-04-10 | End: 2025-04-21 | Stop reason: HOSPADM

## 2025-04-10 RX ORDER — SODIUM BICARBONATE 650 MG/1
650 TABLET ORAL 3 TIMES DAILY
Status: DISCONTINUED | OUTPATIENT
Start: 2025-04-10 | End: 2025-04-21 | Stop reason: HOSPADM

## 2025-04-10 RX ORDER — ONDANSETRON 2 MG/ML
4 INJECTION INTRAMUSCULAR; INTRAVENOUS EVERY 6 HOURS PRN
Status: DISCONTINUED | OUTPATIENT
Start: 2025-04-10 | End: 2025-04-21 | Stop reason: HOSPADM

## 2025-04-10 RX ORDER — DEXTROSE MONOHYDRATE 100 MG/ML
INJECTION, SOLUTION INTRAVENOUS CONTINUOUS PRN
Status: DISCONTINUED | OUTPATIENT
Start: 2025-04-10 | End: 2025-04-21 | Stop reason: HOSPADM

## 2025-04-10 RX ADMIN — SODIUM CHLORIDE, SODIUM LACTATE, POTASSIUM CHLORIDE, AND CALCIUM CHLORIDE 3621 ML: .6; .31; .03; .02 INJECTION, SOLUTION INTRAVENOUS at 12:47

## 2025-04-10 RX ADMIN — SODIUM BICARBONATE 650 MG: 650 TABLET ORAL at 20:43

## 2025-04-10 RX ADMIN — CEFEPIME 2000 MG: 2 INJECTION, POWDER, FOR SOLUTION INTRAVENOUS at 16:26

## 2025-04-10 RX ADMIN — HEPARIN SODIUM 2000 UNITS: 1000 INJECTION INTRAVENOUS; SUBCUTANEOUS at 19:40

## 2025-04-10 RX ADMIN — DILTIAZEM HYDROCHLORIDE 5 MG/HR: 5 INJECTION INTRAVENOUS at 14:55

## 2025-04-10 RX ADMIN — ONDANSETRON 4 MG: 2 INJECTION, SOLUTION INTRAMUSCULAR; INTRAVENOUS at 12:44

## 2025-04-10 RX ADMIN — ONDANSETRON 4 MG: 2 INJECTION, SOLUTION INTRAMUSCULAR; INTRAVENOUS at 16:37

## 2025-04-10 RX ADMIN — SODIUM CHLORIDE 1000 ML: 0.9 INJECTION, SOLUTION INTRAVENOUS at 15:06

## 2025-04-10 RX ADMIN — SODIUM CHLORIDE: 0.9 INJECTION, SOLUTION INTRAVENOUS at 19:44

## 2025-04-10 RX ADMIN — INSULIN HUMAN 10 UNITS: 100 INJECTION, SOLUTION PARENTERAL at 15:37

## 2025-04-10 RX ADMIN — DULOXETINE 30 MG: 30 CAPSULE, DELAYED RELEASE ORAL at 20:43

## 2025-04-10 RX ADMIN — METOPROLOL 100 MG: 100 TABLET ORAL at 20:44

## 2025-04-10 RX ADMIN — PRAVASTATIN SODIUM 20 MG: 40 TABLET ORAL at 22:00

## 2025-04-10 RX ADMIN — ONDANSETRON 4 MG: 2 INJECTION INTRAMUSCULAR; INTRAVENOUS at 16:37

## 2025-04-10 RX ADMIN — HYDROXYZINE HYDROCHLORIDE 50 MG: 25 TABLET, FILM COATED ORAL at 20:44

## 2025-04-10 RX ADMIN — DICYCLOMINE HYDROCHLORIDE 20 MG: 10 CAPSULE ORAL at 20:43

## 2025-04-10 RX ADMIN — Medication 1000 UNITS: at 20:44

## 2025-04-10 RX ADMIN — CLINDAMYCIN IN 5 PERCENT DEXTROSE 900 MG: 18 INJECTION, SOLUTION INTRAVENOUS at 13:36

## 2025-04-10 RX ADMIN — HEPARIN SODIUM 4000 UNITS: 1000 INJECTION INTRAVENOUS; SUBCUTANEOUS at 15:30

## 2025-04-10 RX ADMIN — DEXTROSE MONOHYDRATE 250 ML: 100 INJECTION, SOLUTION INTRAVENOUS at 15:40

## 2025-04-10 RX ADMIN — SODIUM BICARBONATE: 84 INJECTION, SOLUTION INTRAVENOUS at 17:46

## 2025-04-10 RX ADMIN — CALCIUM GLUCONATE 1000 MG: 20 INJECTION, SOLUTION INTRAVENOUS at 16:27

## 2025-04-10 RX ADMIN — AMIODARONE HYDROCHLORIDE 150 MG: 1.5 INJECTION, SOLUTION INTRAVENOUS at 19:29

## 2025-04-10 RX ADMIN — HEPARIN SODIUM 8 UNITS/KG/HR: 10000 INJECTION, SOLUTION INTRAVENOUS at 15:36

## 2025-04-10 RX ADMIN — DILTIAZEM HYDROCHLORIDE 30 MG: 5 INJECTION, SOLUTION INTRAVENOUS at 14:53

## 2025-04-10 RX ADMIN — ONDANSETRON 4 MG: 2 INJECTION, SOLUTION INTRAMUSCULAR; INTRAVENOUS at 20:51

## 2025-04-10 RX ADMIN — AMIODARONE HYDROCHLORIDE 1 MG/MIN: 1.8 INJECTION, SOLUTION INTRAVENOUS at 19:42

## 2025-04-10 RX ADMIN — SODIUM ZIRCONIUM CYCLOSILICATE 10 G: 10 POWDER, FOR SUSPENSION ORAL at 15:52

## 2025-04-10 RX ADMIN — Medication 2500 MG: at 17:49

## 2025-04-10 ASSESSMENT — PAIN DESCRIPTION - PAIN TYPE: TYPE: ACUTE PAIN

## 2025-04-10 ASSESSMENT — PAIN DESCRIPTION - LOCATION: LOCATION: ABDOMEN

## 2025-04-10 ASSESSMENT — PAIN DESCRIPTION - DESCRIPTORS: DESCRIPTORS: ACHING;CRAMPING;CRUSHING;DISCOMFORT

## 2025-04-10 ASSESSMENT — PAIN SCALES - GENERAL
PAINLEVEL_OUTOF10: 10
PAINLEVEL_OUTOF10: 5

## 2025-04-10 ASSESSMENT — PAIN DESCRIPTION - FREQUENCY: FREQUENCY: CONTINUOUS

## 2025-04-10 ASSESSMENT — PAIN - FUNCTIONAL ASSESSMENT: PAIN_FUNCTIONAL_ASSESSMENT: 0-10

## 2025-04-10 NOTE — ED NOTES
ED to inpatient nurses report     Admitting Diagnosis: Nausea and Vomiting; Dehydration; Atrial Flutter; Osteomyelitis; SIRS; Acute kidney injury  Chief Complaint   Patient presents with    Nausea    Vomiting    Abdominal Pain     Since April 2 or 3    Atrial Flutter     New for him      Chest Pain     Left side    Dizziness    Shortness of Breath      Present to ED from home.  LOC: alert and orientated to name, place, date  Patient confused: no  Vital signs   Vitals:    04/10/25 1445 04/10/25 1453 04/10/25 1500 04/10/25 1530   BP:  (!) 148/107  (!) 164/94   Pulse: (!) 147 (!) 146 73 81   Resp: 24 23 17 12   Temp:       TempSrc:       SpO2: 93%      Weight:       Height:          Oxygen Baseline none    Current needs required 2L NC   SEPSIS:   [No] Lactate X 2 ordered (Yes or No)  [Yes] Antibiotics given (Yes or No)  [Yes] IV Fluids ordered (Yes or No)             [Yes] 2nd IV completed (Yes or No)  [Yes] Hourly Vital Signs (Validated)  [No] Outstanding Orders:     LDAs:   Peripheral IV 04/10/25 Right;Anterior Cephalic (Active)       Peripheral IV 04/10/25 Distal;Left Antecubital (Active)   Site Assessment Clean, dry & intact 04/10/25 1536   Line Status Brisk blood return;Flushed;Normal saline locked;Specimen collected 04/10/25 1536     Mobility: Requires assistance * 1  Fall Risk: [place] 1 Fall Risk  Presents to emergency department  because of falls (Syncope, seizure, or loss of consciousness): No (04/10/25 1129)  Age > 70: No (04/10/25 1129)  Altered Mental Status, Intoxication with alcohol or substance confusion (Disorientation, impaired judgment, poor safety awaremess, or inability to follow instructions): No (04/10/25 1129)  Impaired Mobility: Ambulates or transfers with assistive devices or assistance; Unable to ambulate or transer.: Yes (04/10/25 1129)  Nursing Judgement: No (04/10/25 1129)  Outstanding ED orders: none    Consults: IP CONSULT TO PODIATRY  PHARMACY TO DOSE VANCOMYCIN  IP CONSULT TO INFECTIOUS

## 2025-04-10 NOTE — ED NOTES
AT 1500 patients heart rate flipped to A-flutter after I gave him Cardizem 30 mg IV. Rate in the 70's and 80's

## 2025-04-10 NOTE — ED PROVIDER NOTES
HR less than 100 bpm     Contact Provider if::   SBP less than 90 mmHg     Contact Provider if::   HR less than 60 bpm     HOLD for:   SBP less than 90 mmHg     HOLD for:   HR less than 60 bpm    dilTIAZem injection 30 mg    heparin (porcine) injection 4,000 Units    heparin (porcine) injection 4,000 Units    heparin (porcine) injection 2,000 Units    heparin 25,000 units in dextrose 5% 250 mL (premix) infusion    DISCONTD: calcium gluconate 1,000 mg in sodium chloride 0.9 % 100 mL IVPB    DISCONTD: calcium gluconate 1,000 mg in sodium chloride 0.9 % 100 mL IVPB    calcium gluconate 1,000 mg in sodium chloride 50 mL    FOLLOWED BY Linked Order Group     ceFEPIme (MAXIPIME) 2,000 mg in sodium chloride 0.9 % 100 mL IVPB (addEASE)      Antimicrobial Indications:   Bone and Joint Infection     cefepime (MAXIPIME) 1,000 mg in sodium chloride 0.9 % 50 mL IVPB (addEASE)      Antimicrobial Indications:   Bone and Joint Infection    sodium chloride 0.9 % bolus 1,000 mL    AND Linked Order Group     insulin regular (HumuLIN R;NovoLIN R) injection 10 Units     dextrose bolus 10% 250 mL    glucose chewable tablet 16 g    OR Linked Order Group     dextrose bolus 10% 125 mL     dextrose bolus 10% 250 mL    glucagon injection 1 mg    dextrose 10 % infusion    sodium zirconium cyclosilicate (LOKELMA) oral suspension 10 g    sodium bicarbonate 75 mEq in sodium chloride 0.45 % 1,000 mL infusion     CONSULTS:  IP CONSULT TO PODIATRY  PHARMACY TO DOSE VANCOMYCIN  IP CONSULT TO INFECTIOUS DISEASES  IP CONSULT TO CARDIOLOGY  IP CONSULT TO INTERNAL MEDICINE  IP CONSULT TO NEPHROLOGY  PHARMACY TO DOSE VANCOMYCIN  PHARMACY TO DOSE VANCOMYCIN    FINAL IMPRESSION      1. Nausea and vomiting, unspecified vomiting type    2. Dehydration    3. Atrial flutter, unspecified type (HCC)    4. Osteomyelitis, unspecified site, unspecified type (HCC)    5. SIRS (systemic inflammatory response syndrome) (HCC)    6. Acute kidney injury

## 2025-04-10 NOTE — ED NOTES
Patients right great toe was removed and his right heel was closed up.  Ever since his surgery he has not been able to eat or drink.  He has been vomiting.  Patients right foot is warm and red and draining brown fluid from the right heel.  Patients heart rate when he came in was ST at 150.  Now at 1500 patient is in a-flutter rate is in the 70's and 80's.

## 2025-04-10 NOTE — H&P
4/10/2025 Yes    Elevated serum creatinine 4/10/2025 Yes    Sepsis (HCC) 4/10/2025 Yes    Secondary hypercoagulable state 4/10/2025 Yes    Diabetic ulcer of right heel associated with diabetes mellitus due to underlying condition, limited to breakdown of skin 4/10/2025 Yes       Plan:     Patient status inpatient in the  Progressive Unit/Step down    New onset atrial flutter with rapid ventricular response secondary to sepsis   Cardizem & heparin gtt.  Cardiology on board, appreciate their recommendations.  Monitor telemetry.    Sepsis secondary to right lower extremity diabetic wound with osteomyelitis to calcaneal tuberosity   Fluid bolus received in ED. IV antibiotics with vancomycin, cefepime per podiatry.  Follow cultures; ID on board as well   Podiatry on board, appreciate their recommendations  DEBBI on CKD stage 4 with hyperkalemia   Hyperkalemia treated with calcium gluconate, Lokelma, insulin/dextrose, sodium bicarbonate 75 mEq in 0.45% sodium chloride gtt   Nephrology on board, appreciate their recommendations  Baseline Cr ~3.3, GFR~18; avoid nephrotoxic agents   Hypertension    Resume home Lopressor   Hyperlipidemia   Resume home statin   Depression/anxiety  Resume home Cymbalta     Consultations:   IP CONSULT TO PODIATRY  PHARMACY TO DOSE VANCOMYCIN  IP CONSULT TO INFECTIOUS DISEASES  IP CONSULT TO CARDIOLOGY  IP CONSULT TO INTERNAL MEDICINE  IP CONSULT TO NEPHROLOGY  PHARMACY TO DOSE VANCOMYCIN  PHARMACY TO DOSE VANCOMYCIN    Patient is admitted as inpatient status because of co-morbidities listed above, severity of signs and symptoms as outlined, requirement for current medical therapies and most importantly because of direct risk to patient if care not provided in a hospital setting.  Expected length of stay > 48 hours.    JARAD Rosado  4/10/2025  3:21 PM    Copy sent to Lisseth Lomeli, APRN - CNP

## 2025-04-11 ENCOUNTER — APPOINTMENT (OUTPATIENT)
Dept: INTERVENTIONAL RADIOLOGY/VASCULAR | Age: 65
DRG: 853 | End: 2025-04-11
Payer: MEDICARE

## 2025-04-11 LAB
ANION GAP SERPL CALCULATED.3IONS-SCNC: 15 MMOL/L (ref 9–16)
ANTI-XA UNFRAC HEPARIN: 0.22 IU/L (ref 0.3–0.7)
ANTI-XA UNFRAC HEPARIN: 0.32 IU/L (ref 0.3–0.7)
ANTI-XA UNFRAC HEPARIN: 0.32 IU/L (ref 0.3–0.7)
ANTI-XA UNFRAC HEPARIN: 0.5 IU/L (ref 0.3–0.7)
BASOPHILS # BLD: 0.06 K/UL (ref 0–0.2)
BASOPHILS NFR BLD: 1 % (ref 0–2)
BUN SERPL-MCNC: 99 MG/DL (ref 8–23)
CALCIUM SERPL-MCNC: 8.2 MG/DL (ref 8.8–10.2)
CHLORIDE SERPL-SCNC: 105 MMOL/L (ref 98–107)
CO2 SERPL-SCNC: 12 MMOL/L (ref 20–31)
CREAT SERPL-MCNC: 4.6 MG/DL (ref 0.7–1.2)
CRP SERPL HS-MCNC: 19.8 MG/L (ref 0–5)
EKG ATRIAL RATE: 154 BPM
EKG P-R INTERVAL: 128 MS
EKG Q-T INTERVAL: 308 MS
EKG QRS DURATION: 142 MS
EKG QTC CALCULATION (BAZETT): 493 MS
EKG R AXIS: -7 DEGREES
EKG T AXIS: -105 DEGREES
EKG VENTRICULAR RATE: 154 BPM
EOSINOPHIL # BLD: 0.18 K/UL (ref 0–0.44)
EOSINOPHILS RELATIVE PERCENT: 2 % (ref 1–4)
ERYTHROCYTE [DISTWIDTH] IN BLOOD BY AUTOMATED COUNT: 13 % (ref 11.8–14.4)
ERYTHROCYTE [DISTWIDTH] IN BLOOD BY AUTOMATED COUNT: 13.2 % (ref 11.8–14.4)
ERYTHROCYTE [SEDIMENTATION RATE] IN BLOOD BY PHOTOMETRIC METHOD: 39 MM/HR (ref 0–20)
GFR, ESTIMATED: 13 ML/MIN/1.73M2
GLUCOSE BLD-MCNC: 103 MG/DL (ref 75–110)
GLUCOSE BLD-MCNC: 129 MG/DL (ref 75–110)
GLUCOSE BLD-MCNC: 137 MG/DL (ref 75–110)
GLUCOSE BLD-MCNC: 159 MG/DL (ref 75–110)
GLUCOSE SERPL-MCNC: 135 MG/DL (ref 82–115)
HBV SURFACE AB SERPL IA-ACNC: <3.5 MIU/ML
HBV SURFACE AG SERPL QL IA: NONREACTIVE
HCT VFR BLD AUTO: 33.9 % (ref 40.7–50.3)
HCT VFR BLD AUTO: 35.4 % (ref 40.7–50.3)
HGB BLD-MCNC: 11.1 G/DL (ref 13–17)
HGB BLD-MCNC: 11.6 G/DL (ref 13–17)
IMM GRANULOCYTES # BLD AUTO: 0.1 K/UL (ref 0–0.3)
IMM GRANULOCYTES NFR BLD: 1 %
LYMPHOCYTES NFR BLD: 1.58 K/UL (ref 1.1–3.7)
LYMPHOCYTES RELATIVE PERCENT: 14 % (ref 24–43)
MCH RBC QN AUTO: 28.9 PG (ref 25.2–33.5)
MCH RBC QN AUTO: 29 PG (ref 25.2–33.5)
MCHC RBC AUTO-ENTMCNC: 32.7 G/DL (ref 28.4–34.8)
MCHC RBC AUTO-ENTMCNC: 32.8 G/DL (ref 28.4–34.8)
MCV RBC AUTO: 88.3 FL (ref 82.6–102.9)
MCV RBC AUTO: 88.5 FL (ref 82.6–102.9)
MONOCYTES NFR BLD: 1.02 K/UL (ref 0.1–1.2)
MONOCYTES NFR BLD: 9 % (ref 3–12)
NEUTROPHILS NFR BLD: 73 % (ref 36–65)
NEUTS SEG NFR BLD: 8.7 K/UL (ref 1.5–8.1)
NRBC BLD-RTO: 0 PER 100 WBC
NRBC BLD-RTO: 0 PER 100 WBC
PLATELET # BLD AUTO: 199 K/UL (ref 138–453)
PLATELET # BLD AUTO: 236 K/UL (ref 138–453)
PMV BLD AUTO: 10.9 FL (ref 8.1–13.5)
PMV BLD AUTO: 11 FL (ref 8.1–13.5)
POTASSIUM SERPL-SCNC: 5.2 MMOL/L (ref 3.7–5.3)
RBC # BLD AUTO: 3.84 M/UL (ref 4.21–5.77)
RBC # BLD AUTO: 4 M/UL (ref 4.21–5.77)
SODIUM SERPL-SCNC: 132 MMOL/L (ref 136–145)
VANCOMYCIN SERPL-MCNC: 23.2 UG/ML (ref 5–40)
WBC OTHER # BLD: 11.6 K/UL (ref 3.5–11.3)
WBC OTHER # BLD: 9.6 K/UL (ref 3.5–11.3)

## 2025-04-11 PROCEDURE — 2580000003 HC RX 258: Performed by: INTERNAL MEDICINE

## 2025-04-11 PROCEDURE — 2580000003 HC RX 258

## 2025-04-11 PROCEDURE — 2500000003 HC RX 250 WO HCPCS: Performed by: INTERNAL MEDICINE

## 2025-04-11 PROCEDURE — 97530 THERAPEUTIC ACTIVITIES: CPT

## 2025-04-11 PROCEDURE — 2500000003 HC RX 250 WO HCPCS

## 2025-04-11 PROCEDURE — 80202 ASSAY OF VANCOMYCIN: CPT

## 2025-04-11 PROCEDURE — 6360000002 HC RX W HCPCS: Performed by: EMERGENCY MEDICINE

## 2025-04-11 PROCEDURE — 99232 SBSQ HOSP IP/OBS MODERATE 35: CPT | Performed by: INTERNAL MEDICINE

## 2025-04-11 PROCEDURE — 97163 PT EVAL HIGH COMPLEX 45 MIN: CPT

## 2025-04-11 PROCEDURE — 6360000002 HC RX W HCPCS: Performed by: INTERNAL MEDICINE

## 2025-04-11 PROCEDURE — 99223 1ST HOSP IP/OBS HIGH 75: CPT | Performed by: INTERNAL MEDICINE

## 2025-04-11 PROCEDURE — 87340 HEPATITIS B SURFACE AG IA: CPT

## 2025-04-11 PROCEDURE — 85027 COMPLETE CBC AUTOMATED: CPT

## 2025-04-11 PROCEDURE — 6370000000 HC RX 637 (ALT 250 FOR IP)

## 2025-04-11 PROCEDURE — 5A1D70Z PERFORMANCE OF URINARY FILTRATION, INTERMITTENT, LESS THAN 6 HOURS PER DAY: ICD-10-PCS | Performed by: INTERNAL MEDICINE

## 2025-04-11 PROCEDURE — 86140 C-REACTIVE PROTEIN: CPT

## 2025-04-11 PROCEDURE — 80048 BASIC METABOLIC PNL TOTAL CA: CPT

## 2025-04-11 PROCEDURE — 6360000002 HC RX W HCPCS: Performed by: RADIOLOGY

## 2025-04-11 PROCEDURE — 86317 IMMUNOASSAY INFECTIOUS AGENT: CPT

## 2025-04-11 PROCEDURE — 85520 HEPARIN ASSAY: CPT

## 2025-04-11 PROCEDURE — 6360000002 HC RX W HCPCS

## 2025-04-11 PROCEDURE — 2709999900 IR NONTUNNELED VASCULAR CATHETER > 5 YEARS

## 2025-04-11 PROCEDURE — 77001 FLUOROGUIDE FOR VEIN DEVICE: CPT

## 2025-04-11 PROCEDURE — 99211 OFF/OP EST MAY X REQ PHY/QHP: CPT

## 2025-04-11 PROCEDURE — 85025 COMPLETE CBC W/AUTO DIFF WBC: CPT

## 2025-04-11 PROCEDURE — 97535 SELF CARE MNGMENT TRAINING: CPT

## 2025-04-11 PROCEDURE — 36415 COLL VENOUS BLD VENIPUNCTURE: CPT

## 2025-04-11 PROCEDURE — 2000000000 HC ICU R&B

## 2025-04-11 PROCEDURE — 97116 GAIT TRAINING THERAPY: CPT

## 2025-04-11 PROCEDURE — 6370000000 HC RX 637 (ALT 250 FOR IP): Performed by: INTERNAL MEDICINE

## 2025-04-11 PROCEDURE — 85652 RBC SED RATE AUTOMATED: CPT

## 2025-04-11 PROCEDURE — 97167 OT EVAL HIGH COMPLEX 60 MIN: CPT

## 2025-04-11 PROCEDURE — 36556 INSERT NON-TUNNEL CV CATH: CPT

## 2025-04-11 PROCEDURE — 76937 US GUIDE VASCULAR ACCESS: CPT

## 2025-04-11 PROCEDURE — 82947 ASSAY GLUCOSE BLOOD QUANT: CPT

## 2025-04-11 PROCEDURE — 90935 HEMODIALYSIS ONE EVALUATION: CPT

## 2025-04-11 RX ORDER — HEPARIN SODIUM 1000 [USP'U]/ML
INJECTION, SOLUTION INTRAVENOUS; SUBCUTANEOUS PRN
Status: COMPLETED | OUTPATIENT
Start: 2025-04-11 | End: 2025-04-11

## 2025-04-11 RX ORDER — AMIODARONE HYDROCHLORIDE 200 MG/1
200 TABLET ORAL 2 TIMES DAILY
Status: DISCONTINUED | OUTPATIENT
Start: 2025-04-12 | End: 2025-04-16

## 2025-04-11 RX ORDER — PROCHLORPERAZINE EDISYLATE 5 MG/ML
10 INJECTION INTRAMUSCULAR; INTRAVENOUS EVERY 6 HOURS PRN
Status: DISCONTINUED | OUTPATIENT
Start: 2025-04-11 | End: 2025-04-21 | Stop reason: HOSPADM

## 2025-04-11 RX ADMIN — AMIODARONE HYDROCHLORIDE 1 MG/MIN: 1.8 INJECTION, SOLUTION INTRAVENOUS at 07:03

## 2025-04-11 RX ADMIN — VANCOMYCIN HYDROCHLORIDE 1000 MG: 1 INJECTION, POWDER, LYOPHILIZED, FOR SOLUTION INTRAVENOUS at 21:12

## 2025-04-11 RX ADMIN — HEPARIN SODIUM 12 UNITS/KG/HR: 10000 INJECTION, SOLUTION INTRAVENOUS at 21:11

## 2025-04-11 RX ADMIN — ZOLPIDEM TARTRATE 5 MG: 5 TABLET, FILM COATED ORAL at 00:46

## 2025-04-11 RX ADMIN — AMIODARONE HYDROCHLORIDE 1 MG/MIN: 1.8 INJECTION, SOLUTION INTRAVENOUS at 13:37

## 2025-04-11 RX ADMIN — SODIUM BICARBONATE 650 MG: 650 TABLET ORAL at 20:24

## 2025-04-11 RX ADMIN — TRAZODONE HYDROCHLORIDE 50 MG: 50 TABLET ORAL at 00:46

## 2025-04-11 RX ADMIN — PRAVASTATIN SODIUM 20 MG: 40 TABLET ORAL at 08:33

## 2025-04-11 RX ADMIN — HEPARIN SODIUM 12 UNITS/KG/HR: 10000 INJECTION, SOLUTION INTRAVENOUS at 07:39

## 2025-04-11 RX ADMIN — ONDANSETRON 4 MG: 2 INJECTION, SOLUTION INTRAMUSCULAR; INTRAVENOUS at 02:02

## 2025-04-11 RX ADMIN — METOPROLOL 100 MG: 100 TABLET ORAL at 20:24

## 2025-04-11 RX ADMIN — HEPARIN SODIUM 2000 UNITS: 1000 INJECTION INTRAVENOUS; SUBCUTANEOUS at 07:39

## 2025-04-11 RX ADMIN — HEPARIN SODIUM 10 UNITS/KG/HR: 10000 INJECTION, SOLUTION INTRAVENOUS at 02:11

## 2025-04-11 RX ADMIN — HYDROXYZINE HYDROCHLORIDE 50 MG: 25 TABLET, FILM COATED ORAL at 08:33

## 2025-04-11 RX ADMIN — Medication 1000 UNITS: at 20:27

## 2025-04-11 RX ADMIN — DULOXETINE 30 MG: 30 CAPSULE, DELAYED RELEASE ORAL at 08:33

## 2025-04-11 RX ADMIN — SODIUM BICARBONATE 650 MG: 650 TABLET ORAL at 08:33

## 2025-04-11 RX ADMIN — ZOLPIDEM TARTRATE 5 MG: 5 TABLET, FILM COATED ORAL at 21:39

## 2025-04-11 RX ADMIN — HEPARIN SODIUM 1400 UNITS: 1000 INJECTION INTRAVENOUS; SUBCUTANEOUS at 15:07

## 2025-04-11 RX ADMIN — TRAZODONE HYDROCHLORIDE 50 MG: 50 TABLET ORAL at 21:39

## 2025-04-11 RX ADMIN — METOPROLOL 100 MG: 100 TABLET ORAL at 08:32

## 2025-04-11 RX ADMIN — ONDANSETRON 4 MG: 2 INJECTION, SOLUTION INTRAMUSCULAR; INTRAVENOUS at 08:42

## 2025-04-11 RX ADMIN — SODIUM BICARBONATE: 84 INJECTION, SOLUTION INTRAVENOUS at 01:54

## 2025-04-11 RX ADMIN — AMIODARONE HYDROCHLORIDE 1 MG/MIN: 1.8 INJECTION, SOLUTION INTRAVENOUS at 01:49

## 2025-04-11 RX ADMIN — HEPARIN SODIUM 1100 UNITS: 1000 INJECTION INTRAVENOUS; SUBCUTANEOUS at 15:11

## 2025-04-11 RX ADMIN — HEPARIN SODIUM 1200 UNITS: 1000 INJECTION INTRAVENOUS; SUBCUTANEOUS at 19:59

## 2025-04-11 RX ADMIN — CEFEPIME 1000 MG: 1 INJECTION, POWDER, FOR SOLUTION INTRAMUSCULAR; INTRAVENOUS at 02:05

## 2025-04-11 RX ADMIN — HYDROXYZINE HYDROCHLORIDE 50 MG: 25 TABLET, FILM COATED ORAL at 20:24

## 2025-04-11 RX ADMIN — SODIUM BICARBONATE: 84 INJECTION, SOLUTION INTRAVENOUS at 21:37

## 2025-04-11 RX ADMIN — SODIUM BICARBONATE: 84 INJECTION, SOLUTION INTRAVENOUS at 10:54

## 2025-04-11 RX ADMIN — HEPARIN SODIUM 1400 UNITS: 1000 INJECTION INTRAVENOUS; SUBCUTANEOUS at 19:59

## 2025-04-11 RX ADMIN — CEFEPIME 1000 MG: 1 INJECTION, POWDER, FOR SOLUTION INTRAMUSCULAR; INTRAVENOUS at 16:15

## 2025-04-11 ASSESSMENT — PAIN SCALES - GENERAL
PAINLEVEL_OUTOF10: 0

## 2025-04-11 NOTE — FLOWSHEET NOTE
04/11/25 1452   Treatment Team Notification   Reason for Communication Evaluate;Review case   Name of Team Member Notified    Treatment Team Role Consulting Provider   Method of Communication Face to face   Response In department   Notification Time 1452       MD rounded. Chart, labs & vitals reviewed. D/c amio once current bag is complete & start Po tomorrow. Keep close watch on H/H

## 2025-04-11 NOTE — FLOWSHEET NOTE
End Of Shift Note  [facility]  Summary of shift: Patient foot was re-wrapped today by podiatry. He was agreeable for HD and is in the middle of a treatment & tolerating it currently. HD RN sates no issues so far with tolerance or blood pressure.     Vitals:    Vitals:    04/11/25 1700 04/11/25 1730 04/11/25 1745 04/11/25 1800   BP: (!) 163/88 (!) 167/93 (!) 148/87 (!) 169/88   Pulse: 74 72 68 69   Resp: 13 17 12 12   Temp: 98.2 °F (36.8 °C)      TempSrc:       SpO2: 99% 98% 100% 99%   Weight: 117.9 kg (259 lb 14.8 oz)      Height:            I&O:   Intake/Output Summary (Last 24 hours) at 4/11/2025 1807  Last data filed at 4/11/2025 1803  Gross per 24 hour   Intake 5627.29 ml   Output 1850 ml   Net 3777.29 ml       Resp Status: 1L    Ventilator Settings:     / / /     Critical Care IV infusions:   Amiodarone 0.5 mg/min (04/11/25 1803)    sodium chloride      heparin (PORCINE) Infusion 12 Units/kg/hr (04/11/25 1803)    sodium chloride      sodium chloride Stopped (04/11/25 0149)    dextrose      sodium bicarbonate 75 mEq in sodium chloride 0.45 % 1,000 mL infusion 125 mL/hr at 04/11/25 1600        LDA:   Peripheral IV 04/10/25 Right;Anterior Cephalic (Active)   Number of days: 1       Peripheral IV 04/10/25 Distal;Left Antecubital (Active)   Number of days: 1       Urinary Catheter 04/10/25 [place] (Active)   Number of days: 1       Hemodialysis Central Access - Temporary Right  (Active)   Number of days: 0       Wound 01/21/25 Toe (Comment  which one) Right scab (Active)   Number of days: 79       Wound 01/21/25 Heel Right quarter size open dry wound (Active)   Number of days: 79       Incision 01/30/25 Toe (Comment  which one) Right (Active)   Number of days: 71       Incision 03/31/25 Heel Right;Posterior (Active)   Number of days: 11

## 2025-04-11 NOTE — FLOWSHEET NOTE
04/11/25 0910   Treatment Team Notification   Reason for Communication Evaluate;Review case   Name of Team Member Notified    Treatment Team Role Consulting Provider   Method of Communication Call   Response See orders   Notification Time 0911     MD called unit, he reviewed labs, vitals. Will put a IR consult for temp HD cath.

## 2025-04-11 NOTE — DIALYSIS
HEMODIALYSIS POST TREATMENT NOTE    Treatment time ordered: 120      Actual treatment time: 120    UltraFiltration Goal: 0  UltraFiltration Removed: 0      Pre Treatment weight: 117.9kg  Post Treatment weight: 117.9  Estimated Dry Weight: na    Access used:     Central Venous Catheter:          Tunneled or Non-tunneled: tunneled           Site: right chest          Access Flow: good 200     Sign and symptoms of infection: na       If YES: na    Medications or blood products given: na    Chronic outpatient schedule: [facility]    Chronic outpatient unit: [facility]    Summary of response to treatment: PAtient treatment completed. patient went well. a total of 600ml inculding rinse back . cvc packed with heparin.     Explain if orders NOT met, was physician notified:veda      ACES flowsheet faxed to patient unit/ placed in patient chart: yes    Post assessment completed: yes    Report given to: alex       * Intra-treatment documented Safety Checks include the followin) Access and face visible at all times.     2) All connections and blood lines are secure with no kinks.     3) NVL alarm engaged.     4) Hemosafe device applied (if applicable).     5) No collapse of Arterial or Venous blood chambers.     6) All blood lines / pump segments in the air detectors.    yoo switched, now draining, pt feels better. yoo draining, pt feels better. sxs that should prompt return explained to pt, verbalizes understanding.

## 2025-04-11 NOTE — FLOWSHEET NOTE
04/11/25 0743   Treatment Team Notification   Reason for Communication Evaluate;Review case   Name of Team Member Notified    Treatment Team Role Attending Provider   Method of Communication Face to face   Response At bedside   Notification Time 0743

## 2025-04-11 NOTE — FLOWSHEET NOTE
04/11/25 1316   Treatment Team Notification   Reason for Communication Evaluate;Review case   Name of Team Member Notified    Treatment Team Role Consulting Provider   Method of Communication Face to face   Response At bedside   Notification Time 1317     MD rounded. Chart reviewed.

## 2025-04-11 NOTE — FLOWSHEET NOTE
04/11/25 0732   Treatment Team Notification   Reason for Communication Critical results   Type of Critical Result Laboratory   Critical Lab Information BUN 99   Person Result Received From Lab   Name of Team Member Notified    Treatment Team Role Attending Provider   Method of Communication Face to face   Response In department

## 2025-04-11 NOTE — CONSULTS
Consult Note  Foot and Ankle Surgery    CC/Reason for consult: Achilles tendon infection with posterior leg ulceration and soft tissue infection    Status post partial excision of calcaneus, excisional debridement wound, right leg, delayed closure (DOS 3-)     Due to the complexity of the patient's chief complaint, the need for staged procedures, and their complicated past medical history, an extended hospital stay is anticipated for optimal management and recovery     HPI:    The patient is a 65 y.o. male seen at [facility] for soft tissue infection to his posterior leg and Achilles tendon.  Patient has had recent surgery with partial removal calcaneus, excision debridement of wound and delayed closure this past March 2025.  Has had a longstanding ulceration and wound to the right heel and leg.  Furthermore he has had previous osteomyelitis of his right first digit for which she has an amputation this past January 2025.  He has been seen outpatient for a longstanding history of diabetic foot ulcers.  Primary history includes history of an Achilles tendon rupture in 2010 for which she had a surgical repair however at this dehisced, and  he's subsequently had chronic ulceration for several years in that area, at several points in time the last several years it has healed, but has reopened. He has had a recent admission this past January for which he underwent reconstruction and excision of the wound with multiple irrigation debridements.  Most recently the wounds were closed.  Approximately 1 week ago patient had onset of chronic stomach pain, nausea, lack of appetite, emesis and dehydration.  He has been nonweightbearing in a splint.  At baseline he is community ambulator without assistive device, on the contralateral extremity he has not had any proximal amputations or ulcerations.  His past medical history includes CKD, diabetes mellitus type 2, A-fib (not on blood thinner medication), history of GI 
  [facility]  Initial Consult Note  Date: 4/11/2025    Hospital day :1     Impression:   Right heel surgical wound dehiscence with associated soft tissue infection  Calcaneal osteomyelitis  Atrial fibrillation with rapid ventricular response  Acute kidney injury on chronic kidney disease stage IV likely secondary to prerenal disease/dehydration  Diabetes mellitus type 2 with associated neuropathy  Essential hypertension    Recommendations   Patient continues on cefepime and did receive vancomycin but I will switch the patient to daptomycin given the acute kidney injury and chronic kidney disease stage IV  The patient does have hyperactive bowel sounds and some diarrhea  If the diarrhea continues he will need stool testing sent  The plan is for surgical debridement by the podiatry team when the patient is medically stable    Chief complaint/reason for consultation:     Question Answer   Reason for Consult? ED consult, abx recommendation and managment, septic gas infection- post op achilles surgery     History of Present Illness:   Prateek Denton II is a 65 y.o.-year-old male who was initially admitted on 4/10/2025.     Prateek has diabetes mellitus type 2 with associated peripheral neuropathy, chronic kidney disease stage IV, single kidney since 2020 [left-sided nephrectomy], hypertension, hyperlipidemia, atrial fibrillation, anemia    The patient was hospitalized earlier this year with a right great toe diabetic foot ulcer with associated osteomyelitis status post right great toe amputation.  The patient also with a right posterior heel ulceration with calcaneal osteomyelitis for which he underwent debridement with bone biopsy and the patient was discharged on a 14-day course of cefepime for 14 days through February 14, 2025.    The patient has been following with the podiatry service and he continued to have an ulcer of the right heel with a fat layer exposed for which on 3/31/2025 the patient 
[facility]   Pharmacy Pharmacokinetic Monitoring Service - Vancomycin     Prateek Denton II is a 65 y.o. male starting on vancomycin therapy for bone and joint infection.   Pharmacy consulted by Vishal Escalera CNP  for monitoring and adjustment.    Target Concentration: Goal trough of 10-15 mg/L (due to renal impairment/insufficiency)    Additional Antimicrobials: cefepime    Pertinent Laboratory Values:   Wt Readings from Last 1 Encounters:   04/10/25 120.7 kg (266 lb)     Temp Readings from Last 1 Encounters:   04/10/25 97.7 °F (36.5 °C)     Estimated Creatinine Clearance: 19 mL/min (A) (based on SCr of 5.1 mg/dL ()).  Recent Labs     04/10/25  1215   CREATININE 5.1*   *   WBC 13.8*       Pertinent Cultures:      MRSA Nasal Swab: N/A. Non-respiratory infection.    Plan:  Concentration-guided dosing due to renal impairment/insufficiency  Give vancomycin 2500mg today  Vancomycin level ordered for 4/11/25 @ 1800  Pharmacy will continue to monitor patient and adjust therapy as indicated    Thank you for the consult,  Genie Otero RPH  4/10/2025 3:29 PM     
Pharmacy dosing of initial vancomycin ordered by Cody Gray DPM. Patient in ED.    Vancomycin 2500 mg ordered x 1    Pharmacy is waiting to see if vancomycin therapy is continued or stopped/changed by the admitting physician.    Genie Otero HERO  4/10/2025  1:26 PM       
use about 45 years ago.  His smokeless tobacco use included chew. He reports current alcohol use of about 2.0 standard drinks of alcohol per week. He reports current drug use. Frequency: 1.00 time per week. Drug: Marijuana (Weed).    Family History:    family history includes Diabetes in his father; High Blood Pressure in his father; Kidney Disease in his mother; Other in his mother; Stroke in his father, maternal grandfather, and maternal grandmother.    Review of Systems:     Constitutional: No fever/chills. HENT: No headache, neck pain or neck stiffnes. No sore throat or dysphagia. Eyes: No blurred vision. Respiratory: As above. Cardiovascular: As above. Gastrointestinal: Positive for abdominal pain, nausea and vomiting. Genitourinary: Negative  Endocrine: Negative. Musculoskeletal: Negative. Skin: Negative. Allergic/Immunologic: Negative. Neurologic: Positive for dizziness and lightheadedness hematological: Negative. Psychiatric: Negative. All other systems are are noted to be otherwise negative.      Physical Exam:   /86   Pulse (!) 154   Temp 97.9 °F (36.6 °C) (Oral)   Resp 18   Ht 1.803 m (5' 11\")   Wt 120.7 kg (266 lb)   SpO2 (!) 89%   BMI 37.10 kg/m²     Intake/Output Summary (Last 24 hours) at 4/10/2025 1851  Last data filed at 4/10/2025 1510  Gross per 24 hour   Intake 500 ml   Output --   Net 500 ml       GENERAL:  Alert, appropriate, oriented, in NAD.  HEENT:  Head is atraumatic and normocephalic. No Pallor.  No icterus.  NECK: Supple without any thyromegaly.  LUNGS: Generally decreased breath sounds.  CARDIAC: S1, S2, irregular rhythm ABD: Soft, positive tenderness on exam.  No clear guarding or rebound.  EXT: Right lower extremity dressing MS: No obvious deformities. SKIN: No obvious skin rashes.  NEURO: No focal neurologic deficits      Labs/ Ancillary data:     CBC:   Recent Labs     04/10/25  1215   WBC 13.8*   HGB 14.1        BMP:    Recent Labs     04/10/25  1215 
04/10/2025 02:37 PM         Serology:  FARIBA:   Lab Results   Component Value Date/Time    FARIBA NEGATIVE 09/28/2020 04:53 AM     C3:  Lab Results   Component Value Date/Time    C3 102 09/28/2020 04:53 AM     C4:  Lab Results   Component Value Date/Time    C4 20 09/28/2020 04:53 AM     MPO ANCA:  No results found for: \"MPO\"  PR3 ANCA:  No results found for: \"PR3\"  hepatitis serologies  ANTIGBM:No results found for: \"GBMABIGG\"  HEPATITIS B SURFACE AG: No results found for: \"HEPBSAG\"  HEPATITIS C AB:   Lab Results   Component Value Date/Time    HEPCAB NONREACTIVE 11/10/2020 09:15 AM       Electrophoresis:  SPEP:No results found for: \"ALBCAL\", \"ALBPCT\", \"LABALPH\", \"A1PCT\", \"A2PCT\", \"LABBETA\", \"BETAPCT\", \"GAMGLOB\", \"GGPCT\", \"PATH\"  UPEP:No results found for: \"LABPE\"         Thank you for the consultation. Please do not hesitate to contact us for any further questions/concerns. We will continue to follow along with you.        Electronically signed by Cesar Duffy MD  on 4/11/2025 at 9:11 AM

## 2025-04-12 LAB
ALBUMIN SERPL-MCNC: 2.8 G/DL (ref 3.5–5.2)
ALBUMIN/GLOB SERPL: 1 {RATIO} (ref 1–2.5)
ALP SERPL-CCNC: 69 U/L (ref 40–129)
ALT SERPL-CCNC: 20 U/L (ref 10–50)
ANION GAP SERPL CALCULATED.3IONS-SCNC: 11 MMOL/L (ref 9–16)
ANTI-XA UNFRAC HEPARIN: 0.35 IU/L (ref 0.3–0.7)
AST SERPL-CCNC: 21 U/L (ref 10–50)
BASOPHILS # BLD: 0.03 K/UL (ref 0–0.2)
BASOPHILS NFR BLD: 0 % (ref 0–2)
BILIRUB DIRECT SERPL-MCNC: 0.1 MG/DL (ref 0–0.2)
BILIRUB INDIRECT SERPL-MCNC: 0.1 MG/DL
BILIRUB SERPL-MCNC: 0.2 MG/DL (ref 0–1.2)
BUN SERPL-MCNC: 67 MG/DL (ref 8–23)
CALCIUM SERPL-MCNC: 7.9 MG/DL (ref 8.8–10.2)
CHLORIDE SERPL-SCNC: 107 MMOL/L (ref 98–107)
CK SERPL-CCNC: 120 U/L (ref 39–308)
CO2 SERPL-SCNC: 19 MMOL/L (ref 20–31)
CREAT SERPL-MCNC: 4 MG/DL (ref 0.7–1.2)
CRP SERPL HS-MCNC: 25.9 MG/L (ref 0–5)
EOSINOPHIL # BLD: 0.22 K/UL (ref 0–0.44)
EOSINOPHILS RELATIVE PERCENT: 3 % (ref 1–4)
ERYTHROCYTE [DISTWIDTH] IN BLOOD BY AUTOMATED COUNT: 13.2 % (ref 11.8–14.4)
ERYTHROCYTE [SEDIMENTATION RATE] IN BLOOD BY PHOTOMETRIC METHOD: 33 MM/HR (ref 0–20)
GFR, ESTIMATED: 16 ML/MIN/1.73M2
GLUCOSE BLD-MCNC: 101 MG/DL (ref 75–110)
GLUCOSE BLD-MCNC: 106 MG/DL (ref 75–110)
GLUCOSE BLD-MCNC: 151 MG/DL (ref 75–110)
GLUCOSE SERPL-MCNC: 106 MG/DL (ref 82–115)
HCT VFR BLD AUTO: 28 % (ref 40.7–50.3)
HGB BLD-MCNC: 9.4 G/DL (ref 13–17)
IMM GRANULOCYTES # BLD AUTO: 0.06 K/UL (ref 0–0.3)
IMM GRANULOCYTES NFR BLD: 1 %
LYMPHOCYTES NFR BLD: 1.02 K/UL (ref 1.1–3.7)
LYMPHOCYTES RELATIVE PERCENT: 13 % (ref 24–43)
MCH RBC QN AUTO: 28.7 PG (ref 25.2–33.5)
MCHC RBC AUTO-ENTMCNC: 33.6 G/DL (ref 28.4–34.8)
MCV RBC AUTO: 85.6 FL (ref 82.6–102.9)
MONOCYTES NFR BLD: 0.71 K/UL (ref 0.1–1.2)
MONOCYTES NFR BLD: 9 % (ref 3–12)
NEUTROPHILS NFR BLD: 73 % (ref 36–65)
NEUTS SEG NFR BLD: 5.58 K/UL (ref 1.5–8.1)
NRBC BLD-RTO: 0 PER 100 WBC
PLATELET # BLD AUTO: 157 K/UL (ref 138–453)
PMV BLD AUTO: 11.5 FL (ref 8.1–13.5)
POTASSIUM SERPL-SCNC: 4 MMOL/L (ref 3.7–5.3)
PROT SERPL-MCNC: 5.6 G/DL (ref 6.6–8.7)
RBC # BLD AUTO: 3.27 M/UL (ref 4.21–5.77)
SODIUM SERPL-SCNC: 137 MMOL/L (ref 136–145)
VANCOMYCIN SERPL-MCNC: 23.5 UG/ML (ref 5–40)
WBC OTHER # BLD: 7.6 K/UL (ref 3.5–11.3)

## 2025-04-12 PROCEDURE — 6370000000 HC RX 637 (ALT 250 FOR IP): Performed by: INTERNAL MEDICINE

## 2025-04-12 PROCEDURE — 82550 ASSAY OF CK (CPK): CPT

## 2025-04-12 PROCEDURE — 6370000000 HC RX 637 (ALT 250 FOR IP): Performed by: NURSE PRACTITIONER

## 2025-04-12 PROCEDURE — 80048 BASIC METABOLIC PNL TOTAL CA: CPT

## 2025-04-12 PROCEDURE — 80202 ASSAY OF VANCOMYCIN: CPT

## 2025-04-12 PROCEDURE — 36415 COLL VENOUS BLD VENIPUNCTURE: CPT

## 2025-04-12 PROCEDURE — 2500000003 HC RX 250 WO HCPCS: Performed by: INTERNAL MEDICINE

## 2025-04-12 PROCEDURE — 2500000003 HC RX 250 WO HCPCS: Performed by: HOSPITALIST

## 2025-04-12 PROCEDURE — 2580000003 HC RX 258: Performed by: HOSPITALIST

## 2025-04-12 PROCEDURE — 85025 COMPLETE CBC W/AUTO DIFF WBC: CPT

## 2025-04-12 PROCEDURE — 51702 INSERT TEMP BLADDER CATH: CPT

## 2025-04-12 PROCEDURE — 99232 SBSQ HOSP IP/OBS MODERATE 35: CPT | Performed by: INTERNAL MEDICINE

## 2025-04-12 PROCEDURE — 90935 HEMODIALYSIS ONE EVALUATION: CPT

## 2025-04-12 PROCEDURE — 2580000003 HC RX 258: Performed by: INTERNAL MEDICINE

## 2025-04-12 PROCEDURE — 80076 HEPATIC FUNCTION PANEL: CPT

## 2025-04-12 PROCEDURE — 94761 N-INVAS EAR/PLS OXIMETRY MLT: CPT

## 2025-04-12 PROCEDURE — 86140 C-REACTIVE PROTEIN: CPT

## 2025-04-12 PROCEDURE — 2580000003 HC RX 258: Performed by: NURSE PRACTITIONER

## 2025-04-12 PROCEDURE — 6360000002 HC RX W HCPCS: Performed by: EMERGENCY MEDICINE

## 2025-04-12 PROCEDURE — 6360000002 HC RX W HCPCS: Performed by: NURSE PRACTITIONER

## 2025-04-12 PROCEDURE — 82947 ASSAY GLUCOSE BLOOD QUANT: CPT

## 2025-04-12 PROCEDURE — 2060000000 HC ICU INTERMEDIATE R&B

## 2025-04-12 PROCEDURE — 85652 RBC SED RATE AUTOMATED: CPT

## 2025-04-12 PROCEDURE — 85520 HEPARIN ASSAY: CPT

## 2025-04-12 PROCEDURE — 6360000002 HC RX W HCPCS: Performed by: INTERNAL MEDICINE

## 2025-04-12 PROCEDURE — G0545 PR INHERENT VISIT TO INPT: HCPCS | Performed by: INTERNAL MEDICINE

## 2025-04-12 RX ORDER — METRONIDAZOLE 500 MG/1
500 TABLET ORAL EVERY 8 HOURS SCHEDULED
Status: DISCONTINUED | OUTPATIENT
Start: 2025-04-12 | End: 2025-04-21 | Stop reason: HOSPADM

## 2025-04-12 RX ADMIN — SODIUM BICARBONATE 650 MG: 650 TABLET ORAL at 18:11

## 2025-04-12 RX ADMIN — SODIUM BICARBONATE: 84 INJECTION, SOLUTION INTRAVENOUS at 05:43

## 2025-04-12 RX ADMIN — AMIODARONE HYDROCHLORIDE 200 MG: 200 TABLET ORAL at 20:13

## 2025-04-12 RX ADMIN — HEPARIN SODIUM 1400 UNITS: 1000 INJECTION INTRAVENOUS; SUBCUTANEOUS at 15:01

## 2025-04-12 RX ADMIN — AMIODARONE HYDROCHLORIDE 200 MG: 200 TABLET ORAL at 09:10

## 2025-04-12 RX ADMIN — METOPROLOL 100 MG: 100 TABLET ORAL at 09:10

## 2025-04-12 RX ADMIN — DULOXETINE 30 MG: 30 CAPSULE, DELAYED RELEASE ORAL at 09:10

## 2025-04-12 RX ADMIN — METOPROLOL 100 MG: 100 TABLET ORAL at 20:13

## 2025-04-12 RX ADMIN — METRONIDAZOLE 500 MG: 500 TABLET ORAL at 20:13

## 2025-04-12 RX ADMIN — HYDROXYZINE HYDROCHLORIDE 50 MG: 25 TABLET, FILM COATED ORAL at 20:13

## 2025-04-12 RX ADMIN — HYDROXYZINE HYDROCHLORIDE 50 MG: 25 TABLET, FILM COATED ORAL at 09:10

## 2025-04-12 RX ADMIN — PRAVASTATIN SODIUM 20 MG: 40 TABLET ORAL at 09:10

## 2025-04-12 RX ADMIN — Medication 1000 UNITS: at 20:13

## 2025-04-12 RX ADMIN — CEFEPIME 1000 MG: 1 INJECTION, POWDER, FOR SOLUTION INTRAMUSCULAR; INTRAVENOUS at 03:54

## 2025-04-12 RX ADMIN — SODIUM BICARBONATE: 84 INJECTION, SOLUTION INTRAVENOUS at 18:21

## 2025-04-12 RX ADMIN — SODIUM BICARBONATE 650 MG: 650 TABLET ORAL at 09:10

## 2025-04-12 RX ADMIN — SODIUM CHLORIDE, PRESERVATIVE FREE 10 ML: 5 INJECTION INTRAVENOUS at 20:13

## 2025-04-12 RX ADMIN — SODIUM BICARBONATE 650 MG: 650 TABLET ORAL at 20:13

## 2025-04-12 RX ADMIN — SODIUM CHLORIDE, PRESERVATIVE FREE 10 ML: 5 INJECTION INTRAVENOUS at 09:10

## 2025-04-12 RX ADMIN — METRONIDAZOLE 500 MG: 500 TABLET ORAL at 17:44

## 2025-04-12 RX ADMIN — ZOLPIDEM TARTRATE 5 MG: 5 TABLET, FILM COATED ORAL at 23:14

## 2025-04-12 RX ADMIN — DAPTOMYCIN 750 MG: 500 INJECTION, POWDER, LYOPHILIZED, FOR SOLUTION INTRAVENOUS at 17:49

## 2025-04-12 RX ADMIN — HEPARIN SODIUM 1200 UNITS: 1000 INJECTION INTRAVENOUS; SUBCUTANEOUS at 15:00

## 2025-04-12 RX ADMIN — TRAZODONE HYDROCHLORIDE 50 MG: 50 TABLET ORAL at 20:13

## 2025-04-12 ASSESSMENT — PAIN SCALES - GENERAL
PAINLEVEL_OUTOF10: 0

## 2025-04-13 PROBLEM — R11.2 NAUSEA AND VOMITING: Status: ACTIVE | Noted: 2025-04-13

## 2025-04-13 LAB
ANION GAP SERPL CALCULATED.3IONS-SCNC: 9 MMOL/L (ref 9–16)
BASOPHILS # BLD: <0.03 K/UL (ref 0–0.2)
BASOPHILS NFR BLD: 0 % (ref 0–2)
BUN SERPL-MCNC: 36 MG/DL (ref 8–23)
CALCIUM SERPL-MCNC: 7.9 MG/DL (ref 8.8–10.2)
CHLORIDE SERPL-SCNC: 103 MMOL/L (ref 98–107)
CO2 SERPL-SCNC: 25 MMOL/L (ref 20–31)
CREAT SERPL-MCNC: 3.3 MG/DL (ref 0.7–1.2)
CRP SERPL HS-MCNC: 37.4 MG/L (ref 0–5)
EOSINOPHIL # BLD: 0.24 K/UL (ref 0–0.44)
EOSINOPHILS RELATIVE PERCENT: 3 % (ref 1–4)
ERYTHROCYTE [DISTWIDTH] IN BLOOD BY AUTOMATED COUNT: 13.2 % (ref 11.8–14.4)
ERYTHROCYTE [SEDIMENTATION RATE] IN BLOOD BY PHOTOMETRIC METHOD: 24 MM/HR (ref 0–20)
GFR, ESTIMATED: 20 ML/MIN/1.73M2
GLUCOSE BLD-MCNC: 111 MG/DL (ref 75–110)
GLUCOSE BLD-MCNC: 119 MG/DL (ref 75–110)
GLUCOSE BLD-MCNC: 126 MG/DL (ref 75–110)
GLUCOSE BLD-MCNC: 150 MG/DL (ref 75–110)
GLUCOSE SERPL-MCNC: 95 MG/DL (ref 82–115)
HCT VFR BLD AUTO: 28 % (ref 40.7–50.3)
HGB BLD-MCNC: 9.3 G/DL (ref 13–17)
IMM GRANULOCYTES # BLD AUTO: 0.03 K/UL (ref 0–0.3)
IMM GRANULOCYTES NFR BLD: 0 %
LYMPHOCYTES NFR BLD: 1.05 K/UL (ref 1.1–3.7)
LYMPHOCYTES RELATIVE PERCENT: 14 % (ref 24–43)
MCH RBC QN AUTO: 28.9 PG (ref 25.2–33.5)
MCHC RBC AUTO-ENTMCNC: 33.2 G/DL (ref 28–38)
MCV RBC AUTO: 87 FL (ref 82.6–102.9)
MICROORGANISM SPEC CULT: ABNORMAL
MICROORGANISM/AGENT SPEC: ABNORMAL
MICROORGANISM/AGENT SPEC: ABNORMAL
MONOCYTES NFR BLD: 0.97 K/UL (ref 0.1–1.2)
MONOCYTES NFR BLD: 13 % (ref 3–12)
NEUTROPHILS NFR BLD: 70 % (ref 36–65)
NEUTS SEG NFR BLD: 5.48 K/UL (ref 1.5–8.1)
PLATELET # BLD AUTO: 148 K/UL (ref 138–453)
PMV BLD AUTO: 11.3 FL (ref 8.1–13.5)
POTASSIUM SERPL-SCNC: 4.2 MMOL/L (ref 3.7–5.3)
RBC # BLD AUTO: 3.22 M/UL (ref 4.21–5.77)
SODIUM SERPL-SCNC: 137 MMOL/L (ref 136–145)
SPECIMEN DESCRIPTION: ABNORMAL
WBC OTHER # BLD: 7.8 K/UL (ref 3.5–11.3)

## 2025-04-13 PROCEDURE — 6370000000 HC RX 637 (ALT 250 FOR IP): Performed by: INTERNAL MEDICINE

## 2025-04-13 PROCEDURE — 6360000002 HC RX W HCPCS: Performed by: INTERNAL MEDICINE

## 2025-04-13 PROCEDURE — 97110 THERAPEUTIC EXERCISES: CPT

## 2025-04-13 PROCEDURE — 2580000003 HC RX 258: Performed by: INTERNAL MEDICINE

## 2025-04-13 PROCEDURE — G0545 PR INHERENT VISIT TO INPT: HCPCS | Performed by: INTERNAL MEDICINE

## 2025-04-13 PROCEDURE — 2500000003 HC RX 250 WO HCPCS: Performed by: HOSPITALIST

## 2025-04-13 PROCEDURE — 97116 GAIT TRAINING THERAPY: CPT

## 2025-04-13 PROCEDURE — 94761 N-INVAS EAR/PLS OXIMETRY MLT: CPT

## 2025-04-13 PROCEDURE — 2060000000 HC ICU INTERMEDIATE R&B

## 2025-04-13 PROCEDURE — 86140 C-REACTIVE PROTEIN: CPT

## 2025-04-13 PROCEDURE — 99232 SBSQ HOSP IP/OBS MODERATE 35: CPT | Performed by: INTERNAL MEDICINE

## 2025-04-13 PROCEDURE — 2580000003 HC RX 258: Performed by: HOSPITALIST

## 2025-04-13 PROCEDURE — 6370000000 HC RX 637 (ALT 250 FOR IP): Performed by: NURSE PRACTITIONER

## 2025-04-13 PROCEDURE — 80048 BASIC METABOLIC PNL TOTAL CA: CPT

## 2025-04-13 PROCEDURE — 87449 NOS EACH ORGANISM AG IA: CPT

## 2025-04-13 PROCEDURE — 82947 ASSAY GLUCOSE BLOOD QUANT: CPT

## 2025-04-13 PROCEDURE — 85652 RBC SED RATE AUTOMATED: CPT

## 2025-04-13 PROCEDURE — 97530 THERAPEUTIC ACTIVITIES: CPT

## 2025-04-13 PROCEDURE — 2500000003 HC RX 250 WO HCPCS: Performed by: INTERNAL MEDICINE

## 2025-04-13 PROCEDURE — 36415 COLL VENOUS BLD VENIPUNCTURE: CPT

## 2025-04-13 PROCEDURE — 87324 CLOSTRIDIUM AG IA: CPT

## 2025-04-13 PROCEDURE — 85025 COMPLETE CBC W/AUTO DIFF WBC: CPT

## 2025-04-13 RX ORDER — LACTOBACILLUS RHAMNOSUS GG 10B CELL
1 CAPSULE ORAL
Status: DISCONTINUED | OUTPATIENT
Start: 2025-04-14 | End: 2025-04-21 | Stop reason: HOSPADM

## 2025-04-13 RX ADMIN — SODIUM BICARBONATE 650 MG: 650 TABLET ORAL at 08:27

## 2025-04-13 RX ADMIN — SODIUM BICARBONATE 650 MG: 650 TABLET ORAL at 21:20

## 2025-04-13 RX ADMIN — METRONIDAZOLE 500 MG: 500 TABLET ORAL at 14:28

## 2025-04-13 RX ADMIN — METRONIDAZOLE 500 MG: 500 TABLET ORAL at 05:57

## 2025-04-13 RX ADMIN — TRAZODONE HYDROCHLORIDE 50 MG: 50 TABLET ORAL at 21:21

## 2025-04-13 RX ADMIN — ZOLPIDEM TARTRATE 5 MG: 5 TABLET, FILM COATED ORAL at 21:25

## 2025-04-13 RX ADMIN — HYDROXYZINE HYDROCHLORIDE 50 MG: 25 TABLET, FILM COATED ORAL at 21:21

## 2025-04-13 RX ADMIN — METOPROLOL 100 MG: 100 TABLET ORAL at 08:27

## 2025-04-13 RX ADMIN — DULOXETINE 30 MG: 30 CAPSULE, DELAYED RELEASE ORAL at 08:27

## 2025-04-13 RX ADMIN — SODIUM CHLORIDE, PRESERVATIVE FREE 10 ML: 5 INJECTION INTRAVENOUS at 08:27

## 2025-04-13 RX ADMIN — ONDANSETRON 4 MG: 2 INJECTION, SOLUTION INTRAMUSCULAR; INTRAVENOUS at 20:50

## 2025-04-13 RX ADMIN — SODIUM BICARBONATE 650 MG: 650 TABLET ORAL at 14:28

## 2025-04-13 RX ADMIN — Medication 1000 UNITS: at 21:21

## 2025-04-13 RX ADMIN — METRONIDAZOLE 500 MG: 500 TABLET ORAL at 21:20

## 2025-04-13 RX ADMIN — AMIODARONE HYDROCHLORIDE 200 MG: 200 TABLET ORAL at 08:27

## 2025-04-13 RX ADMIN — CEFEPIME 1000 MG: 1 INJECTION, POWDER, FOR SOLUTION INTRAMUSCULAR; INTRAVENOUS at 02:45

## 2025-04-13 RX ADMIN — SODIUM BICARBONATE: 84 INJECTION, SOLUTION INTRAVENOUS at 08:26

## 2025-04-13 RX ADMIN — METOPROLOL 100 MG: 100 TABLET ORAL at 21:20

## 2025-04-13 RX ADMIN — AMIODARONE HYDROCHLORIDE 200 MG: 200 TABLET ORAL at 21:20

## 2025-04-13 RX ADMIN — SODIUM CHLORIDE, PRESERVATIVE FREE 10 ML: 5 INJECTION INTRAVENOUS at 21:23

## 2025-04-13 RX ADMIN — HYDROXYZINE HYDROCHLORIDE 50 MG: 25 TABLET, FILM COATED ORAL at 08:27

## 2025-04-14 LAB
ANION GAP SERPL CALCULATED.3IONS-SCNC: 14 MMOL/L (ref 9–16)
BASOPHILS # BLD: <0.03 K/UL (ref 0–0.2)
BASOPHILS NFR BLD: 0 % (ref 0–2)
BUN SERPL-MCNC: 34 MG/DL (ref 8–23)
C DIFF GDH + TOXINS A+B STL QL IA.RAPID: NEGATIVE
CALCIUM SERPL-MCNC: 8.1 MG/DL (ref 8.8–10.2)
CHLORIDE SERPL-SCNC: 104 MMOL/L (ref 98–107)
CO2 SERPL-SCNC: 22 MMOL/L (ref 20–31)
CREAT SERPL-MCNC: 3.9 MG/DL (ref 0.7–1.2)
CRP SERPL HS-MCNC: 35.7 MG/L (ref 0–5)
EOSINOPHIL # BLD: 0.33 K/UL (ref 0–0.44)
EOSINOPHILS RELATIVE PERCENT: 4 % (ref 1–4)
ERYTHROCYTE [DISTWIDTH] IN BLOOD BY AUTOMATED COUNT: 13 % (ref 11.8–14.4)
ERYTHROCYTE [SEDIMENTATION RATE] IN BLOOD BY PHOTOMETRIC METHOD: 34 MM/HR (ref 0–20)
GFR, ESTIMATED: 16 ML/MIN/1.73M2
GLUCOSE BLD-MCNC: 121 MG/DL (ref 75–110)
GLUCOSE BLD-MCNC: 131 MG/DL (ref 75–110)
GLUCOSE BLD-MCNC: 215 MG/DL (ref 75–110)
GLUCOSE BLD-MCNC: 243 MG/DL (ref 75–110)
GLUCOSE SERPL-MCNC: 97 MG/DL (ref 82–115)
HCT VFR BLD AUTO: 29 % (ref 40.7–50.3)
HGB BLD-MCNC: 9.4 G/DL (ref 13–17)
IMM GRANULOCYTES # BLD AUTO: 0.05 K/UL (ref 0–0.3)
IMM GRANULOCYTES NFR BLD: 1 %
LYMPHOCYTES NFR BLD: 1.12 K/UL (ref 1.1–3.7)
LYMPHOCYTES RELATIVE PERCENT: 13 % (ref 24–43)
MCH RBC QN AUTO: 28.7 PG (ref 25.2–33.5)
MCHC RBC AUTO-ENTMCNC: 32.4 G/DL (ref 28.4–34.8)
MCV RBC AUTO: 88.4 FL (ref 82.6–102.9)
MONOCYTES NFR BLD: 1.03 K/UL (ref 0.1–1.2)
MONOCYTES NFR BLD: 12 % (ref 3–12)
NEUTROPHILS NFR BLD: 70 % (ref 36–65)
NEUTS SEG NFR BLD: 6.18 K/UL (ref 1.5–8.1)
NRBC BLD-RTO: 0 PER 100 WBC
PLATELET # BLD AUTO: 137 K/UL (ref 138–453)
PMV BLD AUTO: 12.4 FL (ref 8.1–13.5)
POTASSIUM SERPL-SCNC: 4 MMOL/L (ref 3.7–5.3)
RBC # BLD AUTO: 3.28 M/UL (ref 4.21–5.77)
SODIUM SERPL-SCNC: 140 MMOL/L (ref 136–145)
SPECIMEN DESCRIPTION: NORMAL
WBC OTHER # BLD: 8.7 K/UL (ref 3.5–11.3)

## 2025-04-14 PROCEDURE — 85025 COMPLETE CBC W/AUTO DIFF WBC: CPT

## 2025-04-14 PROCEDURE — 0Q9L0ZZ DRAINAGE OF RIGHT TARSAL, OPEN APPROACH: ICD-10-PCS

## 2025-04-14 PROCEDURE — 85652 RBC SED RATE AUTOMATED: CPT

## 2025-04-14 PROCEDURE — 87185 SC STD ENZYME DETCJ PER NZM: CPT

## 2025-04-14 PROCEDURE — 87070 CULTURE OTHR SPECIMN AEROBIC: CPT

## 2025-04-14 PROCEDURE — 2500000003 HC RX 250 WO HCPCS

## 2025-04-14 PROCEDURE — 6360000002 HC RX W HCPCS: Performed by: PODIATRIST

## 2025-04-14 PROCEDURE — 2580000003 HC RX 258: Performed by: INTERNAL MEDICINE

## 2025-04-14 PROCEDURE — 2580000003 HC RX 258

## 2025-04-14 PROCEDURE — 3600000002 HC SURGERY LEVEL 2 BASE: Performed by: PODIATRIST

## 2025-04-14 PROCEDURE — 3700000000 HC ANESTHESIA ATTENDED CARE: Performed by: PODIATRIST

## 2025-04-14 PROCEDURE — 6370000000 HC RX 637 (ALT 250 FOR IP)

## 2025-04-14 PROCEDURE — 1200000000 HC SEMI PRIVATE

## 2025-04-14 PROCEDURE — 6360000002 HC RX W HCPCS

## 2025-04-14 PROCEDURE — 87075 CULTR BACTERIA EXCEPT BLOOD: CPT

## 2025-04-14 PROCEDURE — 51798 US URINE CAPACITY MEASURE: CPT

## 2025-04-14 PROCEDURE — 87205 SMEAR GRAM STAIN: CPT

## 2025-04-14 PROCEDURE — 6360000002 HC RX W HCPCS: Performed by: NURSE ANESTHETIST, CERTIFIED REGISTERED

## 2025-04-14 PROCEDURE — 86140 C-REACTIVE PROTEIN: CPT

## 2025-04-14 PROCEDURE — 6360000002 HC RX W HCPCS: Performed by: ANESTHESIOLOGY

## 2025-04-14 PROCEDURE — 99232 SBSQ HOSP IP/OBS MODERATE 35: CPT | Performed by: INTERNAL MEDICINE

## 2025-04-14 PROCEDURE — 80048 BASIC METABOLIC PNL TOTAL CA: CPT

## 2025-04-14 PROCEDURE — 36415 COLL VENOUS BLD VENIPUNCTURE: CPT

## 2025-04-14 PROCEDURE — 6360000002 HC RX W HCPCS: Performed by: INTERNAL MEDICINE

## 2025-04-14 PROCEDURE — 2709999900 HC NON-CHARGEABLE SUPPLY: Performed by: PODIATRIST

## 2025-04-14 PROCEDURE — 82947 ASSAY GLUCOSE BLOOD QUANT: CPT

## 2025-04-14 PROCEDURE — 3700000001 HC ADD 15 MINUTES (ANESTHESIA): Performed by: PODIATRIST

## 2025-04-14 PROCEDURE — 3600000012 HC SURGERY LEVEL 2 ADDTL 15MIN: Performed by: PODIATRIST

## 2025-04-14 PROCEDURE — 7100000001 HC PACU RECOVERY - ADDTL 15 MIN: Performed by: PODIATRIST

## 2025-04-14 PROCEDURE — 7100000000 HC PACU RECOVERY - FIRST 15 MIN: Performed by: PODIATRIST

## 2025-04-14 PROCEDURE — 87076 CULTURE ANAEROBE IDENT EACH: CPT

## 2025-04-14 RX ORDER — SODIUM CHLORIDE 0.9 % (FLUSH) 0.9 %
5-40 SYRINGE (ML) INJECTION EVERY 12 HOURS SCHEDULED
Status: DISCONTINUED | OUTPATIENT
Start: 2025-04-14 | End: 2025-04-14 | Stop reason: HOSPADM

## 2025-04-14 RX ORDER — NALOXONE HYDROCHLORIDE 0.4 MG/ML
INJECTION, SOLUTION INTRAMUSCULAR; INTRAVENOUS; SUBCUTANEOUS PRN
Status: DISCONTINUED | OUTPATIENT
Start: 2025-04-14 | End: 2025-04-14 | Stop reason: HOSPADM

## 2025-04-14 RX ORDER — ONDANSETRON 2 MG/ML
INJECTION INTRAMUSCULAR; INTRAVENOUS
Status: DISCONTINUED | OUTPATIENT
Start: 2025-04-14 | End: 2025-04-14 | Stop reason: SDUPTHER

## 2025-04-14 RX ORDER — HYDROMORPHONE HYDROCHLORIDE 1 MG/ML
0.5 INJECTION, SOLUTION INTRAMUSCULAR; INTRAVENOUS; SUBCUTANEOUS EVERY 5 MIN PRN
Status: DISCONTINUED | OUTPATIENT
Start: 2025-04-14 | End: 2025-04-14 | Stop reason: HOSPADM

## 2025-04-14 RX ORDER — SODIUM CHLORIDE 0.9 % (FLUSH) 0.9 %
5-40 SYRINGE (ML) INJECTION PRN
Status: DISCONTINUED | OUTPATIENT
Start: 2025-04-14 | End: 2025-04-14 | Stop reason: HOSPADM

## 2025-04-14 RX ORDER — FENTANYL CITRATE 50 UG/ML
INJECTION, SOLUTION INTRAMUSCULAR; INTRAVENOUS
Status: DISCONTINUED | OUTPATIENT
Start: 2025-04-14 | End: 2025-04-14 | Stop reason: SDUPTHER

## 2025-04-14 RX ORDER — SODIUM CHLORIDE 9 MG/ML
INJECTION, SOLUTION INTRAVENOUS PRN
Status: DISCONTINUED | OUTPATIENT
Start: 2025-04-14 | End: 2025-04-14 | Stop reason: HOSPADM

## 2025-04-14 RX ORDER — FENTANYL CITRATE 50 UG/ML
25 INJECTION, SOLUTION INTRAMUSCULAR; INTRAVENOUS EVERY 5 MIN PRN
Status: DISCONTINUED | OUTPATIENT
Start: 2025-04-14 | End: 2025-04-14 | Stop reason: HOSPADM

## 2025-04-14 RX ORDER — OXYCODONE HYDROCHLORIDE 5 MG/1
5 TABLET ORAL
Status: DISCONTINUED | OUTPATIENT
Start: 2025-04-14 | End: 2025-04-14 | Stop reason: HOSPADM

## 2025-04-14 RX ORDER — PROPOFOL 10 MG/ML
INJECTION, EMULSION INTRAVENOUS
Status: DISCONTINUED | OUTPATIENT
Start: 2025-04-14 | End: 2025-04-14 | Stop reason: SDUPTHER

## 2025-04-14 RX ORDER — DEXAMETHASONE SODIUM PHOSPHATE 10 MG/ML
INJECTION, SOLUTION INTRAMUSCULAR; INTRAVENOUS
Status: DISCONTINUED | OUTPATIENT
Start: 2025-04-14 | End: 2025-04-14 | Stop reason: SDUPTHER

## 2025-04-14 RX ORDER — BUPIVACAINE HYDROCHLORIDE 5 MG/ML
INJECTION, SOLUTION EPIDURAL; INTRACAUDAL PRN
Status: DISCONTINUED | OUTPATIENT
Start: 2025-04-14 | End: 2025-04-14 | Stop reason: ALTCHOICE

## 2025-04-14 RX ORDER — PROCHLORPERAZINE EDISYLATE 5 MG/ML
10 INJECTION INTRAMUSCULAR; INTRAVENOUS
Status: DISCONTINUED | OUTPATIENT
Start: 2025-04-14 | End: 2025-04-14 | Stop reason: HOSPADM

## 2025-04-14 RX ORDER — LIDOCAINE HYDROCHLORIDE 20 MG/ML
INJECTION, SOLUTION EPIDURAL; INFILTRATION; INTRACAUDAL; PERINEURAL
Status: DISCONTINUED | OUTPATIENT
Start: 2025-04-14 | End: 2025-04-14 | Stop reason: SDUPTHER

## 2025-04-14 RX ORDER — DIPHENHYDRAMINE HYDROCHLORIDE 50 MG/ML
12.5 INJECTION, SOLUTION INTRAMUSCULAR; INTRAVENOUS
Status: DISCONTINUED | OUTPATIENT
Start: 2025-04-14 | End: 2025-04-14 | Stop reason: HOSPADM

## 2025-04-14 RX ORDER — SUCCINYLCHOLINE/SOD CL,ISO/PF 100 MG/5ML
SYRINGE (ML) INTRAVENOUS
Status: DISCONTINUED | OUTPATIENT
Start: 2025-04-14 | End: 2025-04-14 | Stop reason: SDUPTHER

## 2025-04-14 RX ORDER — METOCLOPRAMIDE HYDROCHLORIDE 5 MG/ML
10 INJECTION INTRAMUSCULAR; INTRAVENOUS
Status: DISCONTINUED | OUTPATIENT
Start: 2025-04-14 | End: 2025-04-14 | Stop reason: HOSPADM

## 2025-04-14 RX ORDER — DIPHENHYDRAMINE HYDROCHLORIDE 50 MG/ML
INJECTION, SOLUTION INTRAMUSCULAR; INTRAVENOUS
Status: DISCONTINUED | OUTPATIENT
Start: 2025-04-14 | End: 2025-04-14 | Stop reason: SDUPTHER

## 2025-04-14 RX ADMIN — METOPROLOL 100 MG: 100 TABLET ORAL at 19:44

## 2025-04-14 RX ADMIN — LIDOCAINE HYDROCHLORIDE 80 MG: 20 INJECTION, SOLUTION EPIDURAL; INFILTRATION; INTRACAUDAL; PERINEURAL at 07:39

## 2025-04-14 RX ADMIN — METRONIDAZOLE 500 MG: 500 TABLET ORAL at 14:20

## 2025-04-14 RX ADMIN — ONDANSETRON 4 MG: 2 INJECTION, SOLUTION INTRAMUSCULAR; INTRAVENOUS at 07:47

## 2025-04-14 RX ADMIN — SODIUM BICARBONATE 650 MG: 650 TABLET ORAL at 19:43

## 2025-04-14 RX ADMIN — FENTANYL CITRATE 25 MCG: 50 INJECTION INTRAMUSCULAR; INTRAVENOUS at 09:03

## 2025-04-14 RX ADMIN — TRAZODONE HYDROCHLORIDE 50 MG: 50 TABLET ORAL at 19:43

## 2025-04-14 RX ADMIN — SODIUM CHLORIDE: 9 INJECTION, SOLUTION INTRAVENOUS at 07:36

## 2025-04-14 RX ADMIN — SODIUM CHLORIDE, PRESERVATIVE FREE 10 ML: 5 INJECTION INTRAVENOUS at 22:33

## 2025-04-14 RX ADMIN — METRONIDAZOLE 500 MG: 500 TABLET ORAL at 22:33

## 2025-04-14 RX ADMIN — ZOLPIDEM TARTRATE 5 MG: 5 TABLET, FILM COATED ORAL at 22:36

## 2025-04-14 RX ADMIN — FENTANYL CITRATE 50 MCG: 50 INJECTION INTRAMUSCULAR; INTRAVENOUS at 08:05

## 2025-04-14 RX ADMIN — FENTANYL CITRATE 50 MCG: 50 INJECTION INTRAMUSCULAR; INTRAVENOUS at 07:35

## 2025-04-14 RX ADMIN — PROPOFOL 150 MG: 10 INJECTION, EMULSION INTRAVENOUS at 07:39

## 2025-04-14 RX ADMIN — CEFEPIME 1000 MG: 1 INJECTION, POWDER, FOR SOLUTION INTRAMUSCULAR; INTRAVENOUS at 17:28

## 2025-04-14 RX ADMIN — SODIUM BICARBONATE 650 MG: 650 TABLET ORAL at 14:20

## 2025-04-14 RX ADMIN — DAPTOMYCIN 750 MG: 500 INJECTION, POWDER, LYOPHILIZED, FOR SOLUTION INTRAVENOUS at 17:20

## 2025-04-14 RX ADMIN — CEFEPIME 1000 MG: 1 INJECTION, POWDER, FOR SOLUTION INTRAMUSCULAR; INTRAVENOUS at 03:43

## 2025-04-14 RX ADMIN — INSULIN LISPRO 2 UNITS: 100 INJECTION, SOLUTION INTRAVENOUS; SUBCUTANEOUS at 17:35

## 2025-04-14 RX ADMIN — DIPHENHYDRAMINE HYDROCHLORIDE 25 MG: 50 INJECTION INTRAMUSCULAR; INTRAVENOUS at 07:46

## 2025-04-14 RX ADMIN — HYDROXYZINE HYDROCHLORIDE 50 MG: 25 TABLET, FILM COATED ORAL at 19:43

## 2025-04-14 RX ADMIN — AMIODARONE HYDROCHLORIDE 200 MG: 200 TABLET ORAL at 19:44

## 2025-04-14 RX ADMIN — Medication 100 MG: at 07:40

## 2025-04-14 RX ADMIN — Medication 1000 UNITS: at 22:33

## 2025-04-14 RX ADMIN — INSULIN LISPRO 2 UNITS: 100 INJECTION, SOLUTION INTRAVENOUS; SUBCUTANEOUS at 19:55

## 2025-04-14 RX ADMIN — DEXAMETHASONE SODIUM PHOSPHATE 10 MG: 10 INJECTION, SOLUTION INTRAMUSCULAR; INTRAVENOUS at 07:46

## 2025-04-14 ASSESSMENT — PAIN SCALES - GENERAL
PAINLEVEL_OUTOF10: 8
PAINLEVEL_OUTOF10: 0

## 2025-04-14 ASSESSMENT — PAIN DESCRIPTION - ORIENTATION: ORIENTATION: RIGHT;LEFT

## 2025-04-14 ASSESSMENT — PAIN - FUNCTIONAL ASSESSMENT: PAIN_FUNCTIONAL_ASSESSMENT: FACE, LEGS, ACTIVITY, CRY, AND CONSOLABILITY (FLACC)

## 2025-04-14 ASSESSMENT — PAIN DESCRIPTION - DESCRIPTORS: DESCRIPTORS: ACHING;DISCOMFORT

## 2025-04-14 ASSESSMENT — PAIN DESCRIPTION - LOCATION: LOCATION: SHOULDER

## 2025-04-14 ASSESSMENT — PAIN DESCRIPTION - PAIN TYPE: TYPE: SURGICAL PAIN

## 2025-04-14 NOTE — ANESTHESIA PRE PROCEDURE
Department of Anesthesiology  Preprocedure Note       Name:  Prateek Denton II   Age:  65 y.o.  :  1960                                          MRN:  [id number]         Date:  2025      Surgeon: Surgeon(s):  Trey Del Valle DPM    Procedure: Procedure(s):  LEG DEBRIDEMENT INCISION AND DRAINAGE PRONE, PULSE LAVAGE    Medications prior to admission:   Prior to Admission medications    Medication Sig Start Date End Date Taking? Authorizing Provider   sodium bicarbonate 650 MG tablet Take 1 tablet by mouth 2 times daily 25   ProviderSouleymane MD   albuterol sulfate HFA (PROVENTIL;VENTOLIN;PROAIR) 108 (90 Base) MCG/ACT inhaler INHALE 2 PUFFS INTO THE LUNGS 4 TIMES DAILY AS NEEDED FOR WHEEZING OR SHORTNESS OF BREATH. 3/27/25   Lisseth Coello APRN - CNP   Continuous Glucose Sensor (FREESTYLE RANJIT 3 PLUS SENSOR) MISC Wear continuous for glucose monitoring 24   Lisseth Coello APRN - CNP   Vitamin D (CHOLECALCIFEROL) 25 MCG (1000 UT) TABS tablet Take 1 tablet by mouth daily 10/12/24   Andrew Roberts MD   dilTIAZem (CARDIZEM) 60 MG tablet Take 1 tablet by mouth 2 times daily 10/12/24   Andrew Roberts MD   omeprazole (PRILOSEC) 20 MG delayed release capsule Take 1 capsule by mouth daily  Patient not taking: Reported on 3/31/2025    Souleymane Limon MD   traZODone (DESYREL) 50 MG tablet TAKE 1 TABLET BY MOUTH EVERY DAY AT NIGHT 24   Lisseth Coello APRN - CNP   pravastatin (PRAVACHOL) 20 MG tablet TAKE 1 TABLET BY MOUTH EVERY DAY 24   Lisseth Coello APRN - CNP   hydrOXYzine HCl (ATARAX) 50 MG tablet TAKE 1 TABLET BY MOUTH EVERY DAY AT NIGHT  Patient taking differently: Take 1 tablet by mouth at bedtime 24   Lisseth Coello APRN - CNP   DULoxetine (CYMBALTA) 30 MG extended release capsule Take 1 capsule by mouth daily 6/18/24 3/31/25  Lisseth Coello APRN - CNP   fluticasone (FLONASE) 50 MCG/ACT nasal spray 2 sprays by Nasal route daily as needed

## 2025-04-14 NOTE — ANESTHESIA POSTPROCEDURE EVALUATION
Department of Anesthesiology  Postprocedure Note    Patient: Prateek Denton II  MRN: [id number]  YOB: 1960  Date of evaluation: 4/14/2025    Procedure Summary       Date: 04/14/25 Room / Location: [address]    Anesthesia Start: 0735 Anesthesia Stop: 0837    Procedure: LEG DEBRIDEMENT INCISION AND DRAINAGE PRONE, PULSE LAVAGE (Right) Diagnosis:       Gas gangrene (HCC)      (Gas gangrene (HCC) [A48.0])    Surgeons: Trey Del Valle DPM Responsible Provider: David Corey DO    Anesthesia Type: General ASA Status: 4            Anesthesia Type: General    Destiny Phase I: Destiny Score: 10    Destiny Phase II:      Anesthesia Post Evaluation    Patient location during evaluation: PACU  Patient participation: complete - patient participated  Level of consciousness: awake and alert  Airway patency: patent  Nausea & Vomiting: no nausea and no vomiting  Cardiovascular status: hemodynamically stable  Respiratory status: acceptable  Hydration status: stable  Pain management: adequate    No notable events documented.

## 2025-04-14 NOTE — BRIEF OP NOTE
PODIATRY BRIEF OP NOTE    PATIENT NAME: Prateek Denton II  YOB: 1960  -  65 y.o. male  MRN: [id number]  DATE: 4/14/2025  BILLING #: [id number]    Surgeon(s):  Trey Del Valle DPM     ASSISTANTS:   Zulay Arango DPM, PGY-1  Cody Gray DPM, PGY-3    PRE-OP DIAGNOSIS:   Full-thickness ulceration with exposed bone, right leg  Surgical dehiscence with infection, right leg  Osteomyelitis, right calcaneus  Diabetes mellitus, type II    POST-OP DIAGNOSIS: Same as above.    PROCEDURE:   Incision and drainage, right leg    ANESTHESIA: General    HEMOSTASIS: Direct pressure and electrocautery    ESTIMATED BLOOD LOSS: Less than 50 cc.    MATERIALS: None    INJECTABLES: 10 cc of 0.5% Marcaine Plain intraoperatively    SPECIMEN:   ID Type Source Tests Collected by Time Destination   1 : SOFT TISSUE RIGHT HEEL Tissue Foot CULTURE, ANAEROBIC AND AEROBIC Trey Del Valle DPM 4/14/2025 0816        COMPLICATIONS: None    FINDINGS:  Infection Present At Time Of Surgery (PATOS) (choose all levels that have infection present):  - Organ Space infection (below fascia) present as evidenced by osteomyelitis  Other Findings: All necrotic and nonviable tissue from right leg wound was debrided with the use of a sterile #10 blade and rongeur.  All remaining tissue appeared viable and free from infection.  There is noted to be exposed bone from the right calcaneus.  Wound was flushed with copious amounts of sterile saline via pulse lavage.  Dressings: Betadine soaked iodoform packing gauze, Adaptic, 4x4 gauze, Kerlix, cast padding, ABD, ACE to the knee      POST-OPERATIVE PLAN:  Patient is to remain nonweightbearing to the right lower extremity.  Is important that the patient keeps his right heel elevated off the bed at all times.  Patient will continue receiving antibiotics on the floor per infectious disease recommendation.  Postoperative dressing will stay in place for approximately 24 hours.  At this time

## 2025-04-15 LAB
ANION GAP SERPL CALCULATED.3IONS-SCNC: 12 MMOL/L (ref 9–16)
BASOPHILS # BLD: <0.03 K/UL (ref 0–0.2)
BASOPHILS NFR BLD: 0 % (ref 0–2)
BUN SERPL-MCNC: 46 MG/DL (ref 8–23)
CALCIUM SERPL-MCNC: 8.4 MG/DL (ref 8.8–10.2)
CHLORIDE SERPL-SCNC: 102 MMOL/L (ref 98–107)
CO2 SERPL-SCNC: 23 MMOL/L (ref 20–31)
CREAT SERPL-MCNC: 4.2 MG/DL (ref 0.7–1.2)
EOSINOPHIL # BLD: <0.03 K/UL (ref 0–0.44)
EOSINOPHILS RELATIVE PERCENT: 0 % (ref 1–4)
ERYTHROCYTE [DISTWIDTH] IN BLOOD BY AUTOMATED COUNT: 12.9 % (ref 11.8–14.4)
GFR, ESTIMATED: 15 ML/MIN/1.73M2
GLUCOSE BLD-MCNC: 128 MG/DL (ref 75–110)
GLUCOSE BLD-MCNC: 135 MG/DL (ref 75–110)
GLUCOSE BLD-MCNC: 142 MG/DL (ref 75–110)
GLUCOSE BLD-MCNC: 187 MG/DL (ref 75–110)
GLUCOSE SERPL-MCNC: 135 MG/DL (ref 82–115)
HCT VFR BLD AUTO: 28.7 % (ref 40.7–50.3)
HGB BLD-MCNC: 9.6 G/DL (ref 13–17)
IMM GRANULOCYTES # BLD AUTO: 0.07 K/UL (ref 0–0.3)
IMM GRANULOCYTES NFR BLD: 1 %
LYMPHOCYTES NFR BLD: 0.98 K/UL (ref 1.1–3.7)
LYMPHOCYTES RELATIVE PERCENT: 7 % (ref 24–43)
MCH RBC QN AUTO: 29 PG (ref 25.2–33.5)
MCHC RBC AUTO-ENTMCNC: 33.4 G/DL (ref 28.4–34.8)
MCV RBC AUTO: 86.7 FL (ref 82.6–102.9)
MICROORGANISM SPEC CULT: NORMAL
MICROORGANISM SPEC CULT: NORMAL
MONOCYTES NFR BLD: 0.74 K/UL (ref 0.1–1.2)
MONOCYTES NFR BLD: 5 % (ref 3–12)
NEUTROPHILS NFR BLD: 87 % (ref 36–65)
NEUTS SEG NFR BLD: 12.3 K/UL (ref 1.5–8.1)
NRBC BLD-RTO: 0 PER 100 WBC
PLATELET # BLD AUTO: 148 K/UL (ref 138–453)
PMV BLD AUTO: 12.4 FL (ref 8.1–13.5)
POTASSIUM SERPL-SCNC: 4.5 MMOL/L (ref 3.7–5.3)
RBC # BLD AUTO: 3.31 M/UL (ref 4.21–5.77)
SERVICE CMNT-IMP: NORMAL
SERVICE CMNT-IMP: NORMAL
SODIUM SERPL-SCNC: 136 MMOL/L (ref 136–145)
SPECIMEN DESCRIPTION: NORMAL
SPECIMEN DESCRIPTION: NORMAL
WBC OTHER # BLD: 14.1 K/UL (ref 3.5–11.3)

## 2025-04-15 PROCEDURE — 80048 BASIC METABOLIC PNL TOTAL CA: CPT

## 2025-04-15 PROCEDURE — 2580000003 HC RX 258: Performed by: INTERNAL MEDICINE

## 2025-04-15 PROCEDURE — 94761 N-INVAS EAR/PLS OXIMETRY MLT: CPT

## 2025-04-15 PROCEDURE — 6370000000 HC RX 637 (ALT 250 FOR IP)

## 2025-04-15 PROCEDURE — 36415 COLL VENOUS BLD VENIPUNCTURE: CPT

## 2025-04-15 PROCEDURE — 97116 GAIT TRAINING THERAPY: CPT

## 2025-04-15 PROCEDURE — 2500000003 HC RX 250 WO HCPCS

## 2025-04-15 PROCEDURE — 97110 THERAPEUTIC EXERCISES: CPT

## 2025-04-15 PROCEDURE — 2500000003 HC RX 250 WO HCPCS: Performed by: INTERNAL MEDICINE

## 2025-04-15 PROCEDURE — 99232 SBSQ HOSP IP/OBS MODERATE 35: CPT | Performed by: INTERNAL MEDICINE

## 2025-04-15 PROCEDURE — 1200000000 HC SEMI PRIVATE

## 2025-04-15 PROCEDURE — 6360000002 HC RX W HCPCS: Performed by: INTERNAL MEDICINE

## 2025-04-15 PROCEDURE — 82947 ASSAY GLUCOSE BLOOD QUANT: CPT

## 2025-04-15 PROCEDURE — 85025 COMPLETE CBC W/AUTO DIFF WBC: CPT

## 2025-04-15 PROCEDURE — 97530 THERAPEUTIC ACTIVITIES: CPT

## 2025-04-15 RX ADMIN — AMIODARONE HYDROCHLORIDE 200 MG: 200 TABLET ORAL at 21:30

## 2025-04-15 RX ADMIN — Medication 1000 UNITS: at 22:02

## 2025-04-15 RX ADMIN — METRONIDAZOLE 500 MG: 500 TABLET ORAL at 06:18

## 2025-04-15 RX ADMIN — AMIODARONE HYDROCHLORIDE 200 MG: 200 TABLET ORAL at 09:25

## 2025-04-15 RX ADMIN — ZOLPIDEM TARTRATE 5 MG: 5 TABLET, FILM COATED ORAL at 21:30

## 2025-04-15 RX ADMIN — HYDROXYZINE HYDROCHLORIDE 50 MG: 25 TABLET, FILM COATED ORAL at 09:25

## 2025-04-15 RX ADMIN — TRAZODONE HYDROCHLORIDE 50 MG: 50 TABLET ORAL at 21:31

## 2025-04-15 RX ADMIN — CEFEPIME 1000 MG: 1 INJECTION, POWDER, FOR SOLUTION INTRAMUSCULAR; INTRAVENOUS at 05:15

## 2025-04-15 RX ADMIN — METRONIDAZOLE 500 MG: 500 TABLET ORAL at 14:26

## 2025-04-15 RX ADMIN — SODIUM BICARBONATE 650 MG: 650 TABLET ORAL at 21:30

## 2025-04-15 RX ADMIN — METRONIDAZOLE 500 MG: 500 TABLET ORAL at 21:30

## 2025-04-15 RX ADMIN — WATER 2000 MG: 1 INJECTION INTRAMUSCULAR; INTRAVENOUS; SUBCUTANEOUS at 16:50

## 2025-04-15 RX ADMIN — SODIUM CHLORIDE, PRESERVATIVE FREE 10 ML: 5 INJECTION INTRAVENOUS at 09:26

## 2025-04-15 RX ADMIN — SODIUM CHLORIDE, PRESERVATIVE FREE 10 ML: 5 INJECTION INTRAVENOUS at 21:33

## 2025-04-15 RX ADMIN — SODIUM BICARBONATE 650 MG: 650 TABLET ORAL at 09:25

## 2025-04-15 RX ADMIN — METOPROLOL 100 MG: 100 TABLET ORAL at 09:25

## 2025-04-15 RX ADMIN — SODIUM BICARBONATE 650 MG: 650 TABLET ORAL at 14:26

## 2025-04-15 RX ADMIN — METOPROLOL 100 MG: 100 TABLET ORAL at 21:30

## 2025-04-15 RX ADMIN — DULOXETINE 30 MG: 30 CAPSULE, DELAYED RELEASE ORAL at 09:25

## 2025-04-15 RX ADMIN — HYDROXYZINE HYDROCHLORIDE 50 MG: 25 TABLET, FILM COATED ORAL at 21:31

## 2025-04-15 RX ADMIN — SODIUM CHLORIDE, PRESERVATIVE FREE 10 ML: 5 INJECTION INTRAVENOUS at 22:04

## 2025-04-15 RX ADMIN — INSULIN LISPRO 2 UNITS: 100 INJECTION, SOLUTION INTRAVENOUS; SUBCUTANEOUS at 12:12

## 2025-04-15 RX ADMIN — Medication 1 CAPSULE: at 09:25

## 2025-04-15 ASSESSMENT — PAIN SCALES - GENERAL: PAINLEVEL_OUTOF10: 0

## 2025-04-15 NOTE — OP NOTE
PODIATRY OPERATIVE NOTE    PATIENT NAME: Prateek Denton II  YOB: 1960  -  65 y.o. male  MRN: [id number]  DATE: 4/14/2025  BILLING #: [id number]    Surgeon(s):  Trey Del Valle DPM     ASSISTANTS:   Zulay Arango DPM, PGY-1  Cody Gray DPM, PGY-3     PRE-OP DIAGNOSIS:   Full-thickness ulceration with exposed bone, right leg  Surgical dehiscence with infection, right leg  Osteomyelitis, right calcaneus  Diabetes mellitus, type II    POST-OP DIAGNOSIS: Same as above.    PROCEDURE:   Incision and drainage, right leg     ANESTHESIA: General     HEMOSTASIS: Direct pressure and electrocautery     ESTIMATED BLOOD LOSS: Less than 50 cc.     MATERIALS: None     INJECTABLES: 10 cc of 0.5% Marcaine Plain intraoperatively    SPECIMEN:   ID Type Source Tests Collected by Time Destination   1 : SOFT TISSUE RIGHT HEEL Tissue Foot CULTURE, ANAEROBIC AND AEROBIC Trey Del Valle DPM 4/14/2025 0816        COMPLICATIONS: None    Findings:  Infection Present At Time Of Surgery (PATOS) (choose all levels that have infection present):  - Organ Space infection (below fascia) present as evidenced by osteomyelitis  Other Findings: All necrotic and nonviable tissue from right leg wound was debrided with the use of a sterile #10 blade and rongeur.  All remaining tissue appeared viable and free from infection.  There is noted to be exposed bone from the right calcaneus.  Wound was flushed with copious amounts of sterile saline via pulse lavage.  Dressings: Betadine soaked iodoform packing gauze, Adaptic, 4x4 gauze, Kerlix, cast padding, ABD, ACE to the knee      INDICATIONS FOR PROCEDURE:      Patient is a 65 year-old  male well known to Dr. Del Valle with a chief complaint of chronic nonhealing wound to the posterior right leg overlying the achilles tendon. All risks and rewards of the aforementioned procedure were discussed at length prior to the patient signing the consent form which was witnessed by a

## 2025-04-16 PROBLEM — A48.0 GAS GANGRENE (HCC): Status: ACTIVE | Noted: 2025-04-16

## 2025-04-16 LAB
ANION GAP SERPL CALCULATED.3IONS-SCNC: 11 MMOL/L (ref 9–16)
BASOPHILS # BLD: 0 K/UL (ref 0–0.2)
BASOPHILS NFR BLD: 0 % (ref 0–2)
BUN SERPL-MCNC: 51 MG/DL (ref 8–23)
CALCIUM SERPL-MCNC: 8.2 MG/DL (ref 8.8–10.2)
CHLORIDE SERPL-SCNC: 105 MMOL/L (ref 98–107)
CO2 SERPL-SCNC: 22 MMOL/L (ref 20–31)
CREAT SERPL-MCNC: 4.4 MG/DL (ref 0.7–1.2)
CRP SERPL HS-MCNC: 16.4 MG/L (ref 0–5)
EOSINOPHIL # BLD: 0.2 K/UL (ref 0–0.44)
EOSINOPHILS RELATIVE PERCENT: 2 % (ref 1–4)
ERYTHROCYTE [DISTWIDTH] IN BLOOD BY AUTOMATED COUNT: 13.2 % (ref 11.8–14.4)
ERYTHROCYTE [SEDIMENTATION RATE] IN BLOOD BY PHOTOMETRIC METHOD: 34 MM/HR (ref 0–20)
GFR, ESTIMATED: 14 ML/MIN/1.73M2
GLUCOSE BLD-MCNC: 140 MG/DL (ref 75–110)
GLUCOSE BLD-MCNC: 144 MG/DL (ref 75–110)
GLUCOSE BLD-MCNC: 153 MG/DL (ref 75–110)
GLUCOSE BLD-MCNC: 93 MG/DL (ref 75–110)
GLUCOSE SERPL-MCNC: 102 MG/DL (ref 82–115)
HCT VFR BLD AUTO: 26 % (ref 40.7–50.3)
HGB BLD-MCNC: 8.6 G/DL (ref 13–17)
IMM GRANULOCYTES # BLD AUTO: 0.1 K/UL (ref 0–0.3)
IMM GRANULOCYTES NFR BLD: 1 %
LYMPHOCYTES NFR BLD: 2.24 K/UL (ref 1.1–3.7)
LYMPHOCYTES RELATIVE PERCENT: 22 % (ref 24–43)
MCH RBC QN AUTO: 28.9 PG (ref 25.2–33.5)
MCHC RBC AUTO-ENTMCNC: 33.1 G/DL (ref 28.4–34.8)
MCV RBC AUTO: 87.2 FL (ref 82.6–102.9)
MONOCYTES NFR BLD: 0.82 K/UL (ref 0.1–1.2)
MONOCYTES NFR BLD: 8 % (ref 3–12)
NEUTROPHILS NFR BLD: 67 % (ref 36–65)
NEUTS SEG NFR BLD: 6.84 K/UL (ref 1.5–8.1)
NRBC BLD-RTO: 0 PER 100 WBC
PLATELET # BLD AUTO: 135 K/UL (ref 138–453)
PMV BLD AUTO: 12.5 FL (ref 8.1–13.5)
POTASSIUM SERPL-SCNC: 4.4 MMOL/L (ref 3.7–5.3)
RBC # BLD AUTO: 2.98 M/UL (ref 4.21–5.77)
SODIUM SERPL-SCNC: 137 MMOL/L (ref 136–145)
WBC OTHER # BLD: 10.2 K/UL (ref 3.5–11.3)

## 2025-04-16 PROCEDURE — 6370000000 HC RX 637 (ALT 250 FOR IP): Performed by: INTERNAL MEDICINE

## 2025-04-16 PROCEDURE — 99232 SBSQ HOSP IP/OBS MODERATE 35: CPT | Performed by: NURSE PRACTITIONER

## 2025-04-16 PROCEDURE — 2500000003 HC RX 250 WO HCPCS

## 2025-04-16 PROCEDURE — 2580000003 HC RX 258

## 2025-04-16 PROCEDURE — 6370000000 HC RX 637 (ALT 250 FOR IP)

## 2025-04-16 PROCEDURE — 99232 SBSQ HOSP IP/OBS MODERATE 35: CPT | Performed by: INTERNAL MEDICINE

## 2025-04-16 PROCEDURE — 80048 BASIC METABOLIC PNL TOTAL CA: CPT

## 2025-04-16 PROCEDURE — 86140 C-REACTIVE PROTEIN: CPT

## 2025-04-16 PROCEDURE — 82947 ASSAY GLUCOSE BLOOD QUANT: CPT

## 2025-04-16 PROCEDURE — 2500000003 HC RX 250 WO HCPCS: Performed by: INTERNAL MEDICINE

## 2025-04-16 PROCEDURE — 1200000000 HC SEMI PRIVATE

## 2025-04-16 PROCEDURE — 85025 COMPLETE CBC W/AUTO DIFF WBC: CPT

## 2025-04-16 PROCEDURE — 6360000002 HC RX W HCPCS: Performed by: INTERNAL MEDICINE

## 2025-04-16 PROCEDURE — 99214 OFFICE O/P EST MOD 30 MIN: CPT

## 2025-04-16 PROCEDURE — 6360000002 HC RX W HCPCS

## 2025-04-16 PROCEDURE — 36415 COLL VENOUS BLD VENIPUNCTURE: CPT

## 2025-04-16 PROCEDURE — 97530 THERAPEUTIC ACTIVITIES: CPT

## 2025-04-16 PROCEDURE — 97535 SELF CARE MNGMENT TRAINING: CPT

## 2025-04-16 PROCEDURE — 97110 THERAPEUTIC EXERCISES: CPT

## 2025-04-16 PROCEDURE — 85652 RBC SED RATE AUTOMATED: CPT

## 2025-04-16 RX ORDER — AMLODIPINE BESYLATE 5 MG/1
5 TABLET ORAL DAILY
Status: DISCONTINUED | OUTPATIENT
Start: 2025-04-16 | End: 2025-04-17

## 2025-04-16 RX ORDER — AMIODARONE HYDROCHLORIDE 200 MG/1
200 TABLET ORAL DAILY
Status: DISCONTINUED | OUTPATIENT
Start: 2025-04-17 | End: 2025-04-21 | Stop reason: HOSPADM

## 2025-04-16 RX ORDER — OXYCODONE AND ACETAMINOPHEN 5; 325 MG/1; MG/1
1 TABLET ORAL EVERY 4 HOURS PRN
Refills: 0 | Status: DISCONTINUED | OUTPATIENT
Start: 2025-04-16 | End: 2025-04-21 | Stop reason: HOSPADM

## 2025-04-16 RX ORDER — OXYCODONE AND ACETAMINOPHEN 5; 325 MG/1; MG/1
2 TABLET ORAL EVERY 4 HOURS PRN
Refills: 0 | Status: DISCONTINUED | OUTPATIENT
Start: 2025-04-16 | End: 2025-04-21 | Stop reason: HOSPADM

## 2025-04-16 RX ORDER — LABETALOL HYDROCHLORIDE 5 MG/ML
20 INJECTION, SOLUTION INTRAVENOUS EVERY 6 HOURS PRN
Status: DISCONTINUED | OUTPATIENT
Start: 2025-04-16 | End: 2025-04-21 | Stop reason: HOSPADM

## 2025-04-16 RX ORDER — LACTOBACILLUS RHAMNOSUS GG 10B CELL
1 CAPSULE ORAL ONCE
Status: COMPLETED | OUTPATIENT
Start: 2025-04-16 | End: 2025-04-16

## 2025-04-16 RX ADMIN — ZOLPIDEM TARTRATE 5 MG: 5 TABLET, FILM COATED ORAL at 21:42

## 2025-04-16 RX ADMIN — SODIUM BICARBONATE 650 MG: 650 TABLET ORAL at 16:44

## 2025-04-16 RX ADMIN — SODIUM CHLORIDE, PRESERVATIVE FREE 10 ML: 5 INJECTION INTRAVENOUS at 07:49

## 2025-04-16 RX ADMIN — METRONIDAZOLE 500 MG: 500 TABLET ORAL at 05:39

## 2025-04-16 RX ADMIN — HYDROXYZINE HYDROCHLORIDE 50 MG: 25 TABLET, FILM COATED ORAL at 21:43

## 2025-04-16 RX ADMIN — AMLODIPINE BESYLATE 5 MG: 5 TABLET ORAL at 11:39

## 2025-04-16 RX ADMIN — HYDROXYZINE HYDROCHLORIDE 50 MG: 25 TABLET, FILM COATED ORAL at 10:32

## 2025-04-16 RX ADMIN — Medication 1000 UNITS: at 21:44

## 2025-04-16 RX ADMIN — DULOXETINE 30 MG: 30 CAPSULE, DELAYED RELEASE ORAL at 10:32

## 2025-04-16 RX ADMIN — DAPTOMYCIN 750 MG: 500 INJECTION, POWDER, LYOPHILIZED, FOR SOLUTION INTRAVENOUS at 16:50

## 2025-04-16 RX ADMIN — SODIUM BICARBONATE 650 MG: 650 TABLET ORAL at 10:32

## 2025-04-16 RX ADMIN — METRONIDAZOLE 500 MG: 500 TABLET ORAL at 21:43

## 2025-04-16 RX ADMIN — Medication 1 CAPSULE: at 07:47

## 2025-04-16 RX ADMIN — SODIUM BICARBONATE 650 MG: 650 TABLET ORAL at 21:43

## 2025-04-16 RX ADMIN — LABETALOL HYDROCHLORIDE 20 MG: 5 INJECTION, SOLUTION INTRAVENOUS at 07:47

## 2025-04-16 RX ADMIN — METOPROLOL 100 MG: 100 TABLET ORAL at 10:32

## 2025-04-16 RX ADMIN — LABETALOL HYDROCHLORIDE 20 MG: 5 INJECTION, SOLUTION INTRAVENOUS at 23:40

## 2025-04-16 RX ADMIN — METRONIDAZOLE 500 MG: 500 TABLET ORAL at 16:44

## 2025-04-16 RX ADMIN — SODIUM CHLORIDE, PRESERVATIVE FREE 10 ML: 5 INJECTION INTRAVENOUS at 07:48

## 2025-04-16 RX ADMIN — AMIODARONE HYDROCHLORIDE 200 MG: 200 TABLET ORAL at 10:32

## 2025-04-16 RX ADMIN — LABETALOL HYDROCHLORIDE 20 MG: 5 INJECTION, SOLUTION INTRAVENOUS at 00:39

## 2025-04-16 RX ADMIN — TRAZODONE HYDROCHLORIDE 50 MG: 50 TABLET ORAL at 21:43

## 2025-04-16 RX ADMIN — Medication 1 CAPSULE: at 16:43

## 2025-04-16 RX ADMIN — SODIUM CHLORIDE, PRESERVATIVE FREE 10 ML: 5 INJECTION INTRAVENOUS at 21:44

## 2025-04-16 RX ADMIN — OXYCODONE AND ACETAMINOPHEN 2 TABLET: 325; 5 TABLET ORAL at 13:07

## 2025-04-16 RX ADMIN — WATER 2000 MG: 1 INJECTION INTRAMUSCULAR; INTRAVENOUS; SUBCUTANEOUS at 16:43

## 2025-04-16 RX ADMIN — METOPROLOL 100 MG: 100 TABLET ORAL at 21:43

## 2025-04-16 ASSESSMENT — PAIN SCALES - WONG BAKER: WONGBAKER_NUMERICALRESPONSE: NO HURT

## 2025-04-16 ASSESSMENT — PAIN DESCRIPTION - DESCRIPTORS: DESCRIPTORS: ACHING

## 2025-04-16 ASSESSMENT — PAIN SCALES - GENERAL
PAINLEVEL_OUTOF10: 7
PAINLEVEL_OUTOF10: 3

## 2025-04-16 ASSESSMENT — PAIN DESCRIPTION - LOCATION: LOCATION: FOOT

## 2025-04-16 ASSESSMENT — PAIN DESCRIPTION - ORIENTATION: ORIENTATION: RIGHT

## 2025-04-16 NOTE — FLOWSHEET NOTE
04/16/25 0022   Treatment Team Notification   Reason for Communication Abnormal vitals   Name of Team Member Notified Dr. Cagle   Treatment Team Role Consulting Provider   Method of Communication Call   Response See orders   Notification Time 0022     Writer spoke with Dr. Cagle over the phone regarding patient's elevated BP. Dr. Cagle ordered labetalol 20mg IV q6 PRN for BP >160. No further orders. Care ongoing.

## 2025-04-16 NOTE — DISCHARGE INSTR - COC
Continuity of Care Form    Patient Name: Prateek Denton II   :  1960  MRN:  [id number]    Admit date:  4/10/2025  Discharge date:  25    Code Status Order: Full Code   Advance Directives:     Admitting Physician:  Josse Han DO  PCP: Lisseth Coello, APRNIKITA - CNP    Discharging Nurse:   Discharging Hospital Unit/Room#: 1021/1021-01  Discharging Unit Phone Number: [phone]    Emergency Contact:   Extended Emergency Contact Information  Primary Emergency Contact: Valencia Denton  Address: [address] [place] [id number]  Home Phone: [phone]  Work Phone: [phone]  Mobile Phone: [phone]  Relation: Spouse  Secondary Emergency Contact: ValdemarDiego  Home Phone: [phone]  Mobile Phone: [phone]  Relation: Brother/Sister    Past Surgical History:  Past Surgical History:   Procedure Laterality Date    ADENOIDECTOMY      TWICE AS A CHILD    ANKLE SURGERY Right     RUPTURED ACHILES    CARPAL TUNNEL RELEASE Bilateral     COLONOSCOPY N/A 2023    COLONOSCOPY DIAGNOSTIC performed by Cosme Hunter MD at [facility] OR    COLOSTOMY      temporary    CYSTO/URETERO/PYELOSCOPY, CALCULUS TX Right 10/30/2020    HOLMIUM-STANDBY, CYSTOSCOPY, URETEROSCOPY, STENT EXCHANGE performed by Donal Cano MD at [facility] OR    CYSTOSCOPY Right 2020    CYSTOSCOPY RIGHT URETERAL STENT INSERTION performed by Jeff Johnson MD at [facility] OR    FOOT DEBRIDEMENT Right 2025    FILLET OF RIGHT FIRST TOE, I & D BONE RIGHT CALCANEOUS performed by Trey Del Valle DPM at [facility] OR    HC CATH POWER PICC TRIPLE  2020    REMOVED AROUND 10/07/2020    HEEL SPUR SURGERY Right 3/31/2025    HEEL EXOSTOSIS EXCISION RIGHT performed by Trey Del Valle DPM at [facility] OR    INCISIONAL HERNIA REPAIR  2024    OPEN INCISIONAL HERNIA REPAIR WITH MESH    IR NONTUNNELED VASCULAR CATHETER > 5 YEARS  2025    IR NONTUNNELED VASCULAR CATHETER > 5 YEARS 2025 Brandt Arredondo MD [facility] SPECIAL

## 2025-04-17 LAB
BASOPHILS # BLD: 0.09 K/UL (ref 0–0.2)
BASOPHILS NFR BLD: 1 % (ref 0–2)
CRP SERPL HS-MCNC: 9.9 MG/L (ref 0–5)
EOSINOPHIL # BLD: 0.36 K/UL (ref 0–0.44)
EOSINOPHILS RELATIVE PERCENT: 4 % (ref 1–4)
ERYTHROCYTE [DISTWIDTH] IN BLOOD BY AUTOMATED COUNT: 13.1 % (ref 11.8–14.4)
ERYTHROCYTE [SEDIMENTATION RATE] IN BLOOD BY PHOTOMETRIC METHOD: 50 MM/HR (ref 0–20)
GLUCOSE BLD-MCNC: 123 MG/DL (ref 75–110)
GLUCOSE BLD-MCNC: 136 MG/DL (ref 75–110)
GLUCOSE BLD-MCNC: 146 MG/DL (ref 75–110)
GLUCOSE BLD-MCNC: 88 MG/DL (ref 75–110)
HCT VFR BLD AUTO: 29.6 % (ref 40.7–50.3)
HGB BLD-MCNC: 9.4 G/DL (ref 13–17)
IMM GRANULOCYTES # BLD AUTO: 0.09 K/UL (ref 0–0.3)
IMM GRANULOCYTES NFR BLD: 1 %
LYMPHOCYTES NFR BLD: 2.46 K/UL (ref 1.1–3.7)
LYMPHOCYTES RELATIVE PERCENT: 27 % (ref 24–43)
MCH RBC QN AUTO: 28.5 PG (ref 25.2–33.5)
MCHC RBC AUTO-ENTMCNC: 31.8 G/DL (ref 28.4–34.8)
MCV RBC AUTO: 89.7 FL (ref 82.6–102.9)
MONOCYTES NFR BLD: 0.82 K/UL (ref 0.1–1.2)
MONOCYTES NFR BLD: 9 % (ref 3–12)
NEUTROPHILS NFR BLD: 58 % (ref 36–65)
NEUTS SEG NFR BLD: 5.28 K/UL (ref 1.5–8.1)
NRBC BLD-RTO: 0 PER 100 WBC
PLATELET # BLD AUTO: 157 K/UL (ref 138–453)
PMV BLD AUTO: 11.6 FL (ref 8.1–13.5)
RBC # BLD AUTO: 3.3 M/UL (ref 4.21–5.77)
WBC OTHER # BLD: 9.1 K/UL (ref 3.5–11.3)

## 2025-04-17 PROCEDURE — 6370000000 HC RX 637 (ALT 250 FOR IP)

## 2025-04-17 PROCEDURE — 85652 RBC SED RATE AUTOMATED: CPT

## 2025-04-17 PROCEDURE — 99232 SBSQ HOSP IP/OBS MODERATE 35: CPT | Performed by: INTERNAL MEDICINE

## 2025-04-17 PROCEDURE — 6370000000 HC RX 637 (ALT 250 FOR IP): Performed by: EMERGENCY MEDICINE

## 2025-04-17 PROCEDURE — 36415 COLL VENOUS BLD VENIPUNCTURE: CPT

## 2025-04-17 PROCEDURE — 6360000002 HC RX W HCPCS: Performed by: INTERNAL MEDICINE

## 2025-04-17 PROCEDURE — 2500000003 HC RX 250 WO HCPCS

## 2025-04-17 PROCEDURE — 6370000000 HC RX 637 (ALT 250 FOR IP): Performed by: INTERNAL MEDICINE

## 2025-04-17 PROCEDURE — 97530 THERAPEUTIC ACTIVITIES: CPT

## 2025-04-17 PROCEDURE — 86140 C-REACTIVE PROTEIN: CPT

## 2025-04-17 PROCEDURE — 5A09357 ASSISTANCE WITH RESPIRATORY VENTILATION, LESS THAN 24 CONSECUTIVE HOURS, CONTINUOUS POSITIVE AIRWAY PRESSURE: ICD-10-PCS | Performed by: INTERNAL MEDICINE

## 2025-04-17 PROCEDURE — 97110 THERAPEUTIC EXERCISES: CPT

## 2025-04-17 PROCEDURE — 1200000000 HC SEMI PRIVATE

## 2025-04-17 PROCEDURE — 85025 COMPLETE CBC W/AUTO DIFF WBC: CPT

## 2025-04-17 PROCEDURE — 2500000003 HC RX 250 WO HCPCS: Performed by: INTERNAL MEDICINE

## 2025-04-17 PROCEDURE — 82947 ASSAY GLUCOSE BLOOD QUANT: CPT

## 2025-04-17 RX ORDER — SODIUM CHLOR/HYPOCHLOROUS ACID 0.033 %
SOLUTION, IRRIGATION IRRIGATION PRN
Status: DISCONTINUED | OUTPATIENT
Start: 2025-04-17 | End: 2025-04-21 | Stop reason: HOSPADM

## 2025-04-17 RX ORDER — AMLODIPINE BESYLATE 10 MG/1
10 TABLET ORAL DAILY
Status: DISCONTINUED | OUTPATIENT
Start: 2025-04-17 | End: 2025-04-21 | Stop reason: HOSPADM

## 2025-04-17 RX ADMIN — METOPROLOL 100 MG: 100 TABLET ORAL at 20:03

## 2025-04-17 RX ADMIN — TRAZODONE HYDROCHLORIDE 50 MG: 50 TABLET ORAL at 20:03

## 2025-04-17 RX ADMIN — OXYCODONE AND ACETAMINOPHEN 1 TABLET: 325; 5 TABLET ORAL at 20:03

## 2025-04-17 RX ADMIN — OXYCODONE AND ACETAMINOPHEN 1 TABLET: 325; 5 TABLET ORAL at 08:46

## 2025-04-17 RX ADMIN — HYDROXYZINE HYDROCHLORIDE 50 MG: 25 TABLET, FILM COATED ORAL at 08:46

## 2025-04-17 RX ADMIN — METRONIDAZOLE 500 MG: 500 TABLET ORAL at 15:27

## 2025-04-17 RX ADMIN — METRONIDAZOLE 500 MG: 500 TABLET ORAL at 05:57

## 2025-04-17 RX ADMIN — AMLODIPINE BESYLATE 10 MG: 10 TABLET ORAL at 15:27

## 2025-04-17 RX ADMIN — SODIUM CHLORIDE, PRESERVATIVE FREE 10 ML: 5 INJECTION INTRAVENOUS at 20:04

## 2025-04-17 RX ADMIN — SODIUM BICARBONATE 650 MG: 650 TABLET ORAL at 08:46

## 2025-04-17 RX ADMIN — Medication 1 CAPSULE: at 08:46

## 2025-04-17 RX ADMIN — HYDROXYZINE HYDROCHLORIDE 50 MG: 25 TABLET, FILM COATED ORAL at 20:03

## 2025-04-17 RX ADMIN — DULOXETINE 30 MG: 30 CAPSULE, DELAYED RELEASE ORAL at 08:46

## 2025-04-17 RX ADMIN — AMIODARONE HYDROCHLORIDE 200 MG: 200 TABLET ORAL at 08:46

## 2025-04-17 RX ADMIN — ZOLPIDEM TARTRATE 5 MG: 5 TABLET, FILM COATED ORAL at 21:48

## 2025-04-17 RX ADMIN — SODIUM BICARBONATE 650 MG: 650 TABLET ORAL at 20:03

## 2025-04-17 RX ADMIN — METRONIDAZOLE 500 MG: 500 TABLET ORAL at 20:03

## 2025-04-17 RX ADMIN — AMLODIPINE BESYLATE 5 MG: 5 TABLET ORAL at 08:46

## 2025-04-17 RX ADMIN — Medication 1000 UNITS: at 20:03

## 2025-04-17 RX ADMIN — SODIUM BICARBONATE 650 MG: 650 TABLET ORAL at 15:27

## 2025-04-17 RX ADMIN — SODIUM CHLORIDE, PRESERVATIVE FREE 10 ML: 5 INJECTION INTRAVENOUS at 08:47

## 2025-04-17 RX ADMIN — WATER 2000 MG: 1 INJECTION INTRAMUSCULAR; INTRAVENOUS; SUBCUTANEOUS at 18:08

## 2025-04-17 RX ADMIN — METOPROLOL 100 MG: 100 TABLET ORAL at 08:46

## 2025-04-17 ASSESSMENT — PAIN SCALES - GENERAL
PAINLEVEL_OUTOF10: 5
PAINLEVEL_OUTOF10: 4
PAINLEVEL_OUTOF10: 2
PAINLEVEL_OUTOF10: 0

## 2025-04-17 ASSESSMENT — PAIN DESCRIPTION - LOCATION
LOCATION: FOOT
LOCATION: ANKLE

## 2025-04-17 ASSESSMENT — PAIN DESCRIPTION - DESCRIPTORS
DESCRIPTORS: ACHING
DESCRIPTORS: ACHING;DISCOMFORT;JABBING

## 2025-04-17 ASSESSMENT — PAIN DESCRIPTION - FREQUENCY: FREQUENCY: CONTINUOUS

## 2025-04-17 ASSESSMENT — PAIN DESCRIPTION - ONSET: ONSET: ON-GOING

## 2025-04-17 ASSESSMENT — PAIN DESCRIPTION - ORIENTATION
ORIENTATION: RIGHT
ORIENTATION: RIGHT

## 2025-04-17 ASSESSMENT — PAIN DESCRIPTION - PAIN TYPE: TYPE: SURGICAL PAIN

## 2025-04-18 ENCOUNTER — APPOINTMENT (OUTPATIENT)
Dept: INTERVENTIONAL RADIOLOGY/VASCULAR | Age: 65
DRG: 853 | End: 2025-04-18
Payer: MEDICARE

## 2025-04-18 LAB
ANION GAP SERPL CALCULATED.3IONS-SCNC: 14 MMOL/L (ref 9–16)
BASOPHILS # BLD: 0.05 K/UL (ref 0–0.2)
BASOPHILS NFR BLD: 1 % (ref 0–2)
BUN SERPL-MCNC: 47 MG/DL (ref 8–23)
CALCIUM SERPL-MCNC: 8.2 MG/DL (ref 8.8–10.2)
CHLORIDE SERPL-SCNC: 104 MMOL/L (ref 98–107)
CK SERPL-CCNC: 144 U/L (ref 39–308)
CO2 SERPL-SCNC: 19 MMOL/L (ref 20–31)
CREAT SERPL-MCNC: 4.6 MG/DL (ref 0.7–1.2)
CRP SERPL HS-MCNC: 6.5 MG/L (ref 0–5)
EOSINOPHIL # BLD: 0.51 K/UL (ref 0–0.44)
EOSINOPHILS RELATIVE PERCENT: 6 % (ref 1–4)
ERYTHROCYTE [DISTWIDTH] IN BLOOD BY AUTOMATED COUNT: 13.2 % (ref 11.8–14.4)
ERYTHROCYTE [SEDIMENTATION RATE] IN BLOOD BY PHOTOMETRIC METHOD: 30 MM/HR (ref 0–20)
GFR, ESTIMATED: 13 ML/MIN/1.73M2
GLUCOSE BLD-MCNC: 132 MG/DL (ref 75–110)
GLUCOSE BLD-MCNC: 136 MG/DL (ref 75–110)
GLUCOSE BLD-MCNC: 145 MG/DL (ref 75–110)
GLUCOSE BLD-MCNC: 91 MG/DL (ref 75–110)
GLUCOSE SERPL-MCNC: 83 MG/DL (ref 82–115)
HCT VFR BLD AUTO: 29 % (ref 40.7–50.3)
HGB BLD-MCNC: 9.4 G/DL (ref 13–17)
IMM GRANULOCYTES # BLD AUTO: 0.08 K/UL (ref 0–0.3)
IMM GRANULOCYTES NFR BLD: 1 %
LYMPHOCYTES NFR BLD: 2.68 K/UL (ref 1.1–3.7)
LYMPHOCYTES RELATIVE PERCENT: 29 % (ref 24–43)
MCH RBC QN AUTO: 28.7 PG (ref 25.2–33.5)
MCHC RBC AUTO-ENTMCNC: 32.4 G/DL (ref 28.4–34.8)
MCV RBC AUTO: 88.7 FL (ref 82.6–102.9)
MONOCYTES NFR BLD: 0.9 K/UL (ref 0.1–1.2)
MONOCYTES NFR BLD: 10 % (ref 3–12)
NEUTROPHILS NFR BLD: 53 % (ref 36–65)
NEUTS SEG NFR BLD: 4.97 K/UL (ref 1.5–8.1)
NRBC BLD-RTO: 0 PER 100 WBC
PLATELET # BLD AUTO: 175 K/UL (ref 138–453)
PMV BLD AUTO: 11.6 FL (ref 8.1–13.5)
POTASSIUM SERPL-SCNC: 5.2 MMOL/L (ref 3.7–5.3)
RBC # BLD AUTO: 3.27 M/UL (ref 4.21–5.77)
SODIUM SERPL-SCNC: 137 MMOL/L (ref 136–145)
WBC OTHER # BLD: 9.2 K/UL (ref 3.5–11.3)

## 2025-04-18 PROCEDURE — 80048 BASIC METABOLIC PNL TOTAL CA: CPT

## 2025-04-18 PROCEDURE — 6370000000 HC RX 637 (ALT 250 FOR IP): Performed by: EMERGENCY MEDICINE

## 2025-04-18 PROCEDURE — 97110 THERAPEUTIC EXERCISES: CPT

## 2025-04-18 PROCEDURE — 82947 ASSAY GLUCOSE BLOOD QUANT: CPT

## 2025-04-18 PROCEDURE — 97605 NEG PRS WND THER DME<=50SQCM: CPT

## 2025-04-18 PROCEDURE — C1751 CATH, INF, PER/CENT/MIDLINE: HCPCS

## 2025-04-18 PROCEDURE — 6370000000 HC RX 637 (ALT 250 FOR IP): Performed by: INTERNAL MEDICINE

## 2025-04-18 PROCEDURE — 99232 SBSQ HOSP IP/OBS MODERATE 35: CPT | Performed by: INTERNAL MEDICINE

## 2025-04-18 PROCEDURE — 6370000000 HC RX 637 (ALT 250 FOR IP)

## 2025-04-18 PROCEDURE — 97530 THERAPEUTIC ACTIVITIES: CPT

## 2025-04-18 PROCEDURE — 36415 COLL VENOUS BLD VENIPUNCTURE: CPT

## 2025-04-18 PROCEDURE — 1200000000 HC SEMI PRIVATE

## 2025-04-18 PROCEDURE — 85652 RBC SED RATE AUTOMATED: CPT

## 2025-04-18 PROCEDURE — 6360000002 HC RX W HCPCS

## 2025-04-18 PROCEDURE — 36558 INSERT TUNNELED CV CATH: CPT

## 2025-04-18 PROCEDURE — 85025 COMPLETE CBC W/AUTO DIFF WBC: CPT

## 2025-04-18 PROCEDURE — 2500000003 HC RX 250 WO HCPCS: Performed by: INTERNAL MEDICINE

## 2025-04-18 PROCEDURE — 86140 C-REACTIVE PROTEIN: CPT

## 2025-04-18 PROCEDURE — 2580000003 HC RX 258

## 2025-04-18 PROCEDURE — 77001 FLUOROGUIDE FOR VEIN DEVICE: CPT

## 2025-04-18 PROCEDURE — 2500000003 HC RX 250 WO HCPCS

## 2025-04-18 PROCEDURE — 82550 ASSAY OF CK (CPK): CPT

## 2025-04-18 PROCEDURE — 97116 GAIT TRAINING THERAPY: CPT

## 2025-04-18 PROCEDURE — 02H633Z INSERTION OF INFUSION DEVICE INTO RIGHT ATRIUM, PERCUTANEOUS APPROACH: ICD-10-PCS | Performed by: INTERNAL MEDICINE

## 2025-04-18 PROCEDURE — 6360000002 HC RX W HCPCS: Performed by: INTERNAL MEDICINE

## 2025-04-18 RX ADMIN — HYDROXYZINE HYDROCHLORIDE 50 MG: 25 TABLET, FILM COATED ORAL at 08:11

## 2025-04-18 RX ADMIN — SODIUM BICARBONATE 650 MG: 650 TABLET ORAL at 14:12

## 2025-04-18 RX ADMIN — SODIUM BICARBONATE 650 MG: 650 TABLET ORAL at 08:11

## 2025-04-18 RX ADMIN — ZOLPIDEM TARTRATE 5 MG: 5 TABLET, FILM COATED ORAL at 23:00

## 2025-04-18 RX ADMIN — METOPROLOL 100 MG: 100 TABLET ORAL at 08:10

## 2025-04-18 RX ADMIN — Medication 1 CAPSULE: at 08:10

## 2025-04-18 RX ADMIN — AMIODARONE HYDROCHLORIDE 200 MG: 200 TABLET ORAL at 08:11

## 2025-04-18 RX ADMIN — METRONIDAZOLE 500 MG: 500 TABLET ORAL at 06:02

## 2025-04-18 RX ADMIN — SODIUM CHLORIDE, PRESERVATIVE FREE 10 ML: 5 INJECTION INTRAVENOUS at 11:43

## 2025-04-18 RX ADMIN — METRONIDAZOLE 500 MG: 500 TABLET ORAL at 21:07

## 2025-04-18 RX ADMIN — METRONIDAZOLE 500 MG: 500 TABLET ORAL at 14:12

## 2025-04-18 RX ADMIN — DAPTOMYCIN 750 MG: 500 INJECTION, POWDER, LYOPHILIZED, FOR SOLUTION INTRAVENOUS at 17:14

## 2025-04-18 RX ADMIN — SODIUM CHLORIDE, PRESERVATIVE FREE 10 ML: 5 INJECTION INTRAVENOUS at 21:07

## 2025-04-18 RX ADMIN — TRAZODONE HYDROCHLORIDE 50 MG: 50 TABLET ORAL at 21:07

## 2025-04-18 RX ADMIN — DULOXETINE 30 MG: 30 CAPSULE, DELAYED RELEASE ORAL at 08:10

## 2025-04-18 RX ADMIN — HYDROXYZINE HYDROCHLORIDE 50 MG: 25 TABLET, FILM COATED ORAL at 21:06

## 2025-04-18 RX ADMIN — SODIUM BICARBONATE 650 MG: 650 TABLET ORAL at 21:07

## 2025-04-18 RX ADMIN — OXYCODONE AND ACETAMINOPHEN 1 TABLET: 325; 5 TABLET ORAL at 11:43

## 2025-04-18 RX ADMIN — Medication 1000 UNITS: at 21:06

## 2025-04-18 RX ADMIN — AMLODIPINE BESYLATE 10 MG: 10 TABLET ORAL at 08:11

## 2025-04-18 RX ADMIN — WATER 2000 MG: 1 INJECTION INTRAMUSCULAR; INTRAVENOUS; SUBCUTANEOUS at 17:14

## 2025-04-18 RX ADMIN — OXYCODONE AND ACETAMINOPHEN 1 TABLET: 325; 5 TABLET ORAL at 17:19

## 2025-04-18 RX ADMIN — METOPROLOL 100 MG: 100 TABLET ORAL at 21:06

## 2025-04-18 ASSESSMENT — PAIN DESCRIPTION - ORIENTATION
ORIENTATION: RIGHT
ORIENTATION: RIGHT

## 2025-04-18 ASSESSMENT — PAIN DESCRIPTION - DESCRIPTORS: DESCRIPTORS: ACHING;DISCOMFORT;JABBING;PENETRATING

## 2025-04-18 ASSESSMENT — PAIN SCALES - GENERAL
PAINLEVEL_OUTOF10: 3
PAINLEVEL_OUTOF10: 3
PAINLEVEL_OUTOF10: 6
PAINLEVEL_OUTOF10: 6

## 2025-04-18 ASSESSMENT — PAIN DESCRIPTION - LOCATION
LOCATION: ANKLE
LOCATION: ANKLE

## 2025-04-18 NOTE — DISCHARGE INSTRUCTIONS
Podiatry:  - Please make a follow-up visit with Dr. Del Valle outpatient 1 week after discharge  - Please keep your lower extremity wounds dry clean and covered at all times until follow-up visit  - Wound vac should remain on and changed on Tu/Th/S schedule  - Wound vac settings: Black foam, 125 mmHg, continuous flow  - Weightbearing status: Non weight bearing to affected limb  - Please take all prescribed medications as instructed  - Please restart all home medications as prescribed  - Please return to the hospital if any of the following local signs of infection arise: Increased/streaking redness, pain, swelling, drainage or malodor  - Please return to the hospital for any the following systemic signs of infection arise: Nausea, vomiting, fever, chills, diarrhea, shortness of breath or chest pain

## 2025-04-19 LAB
ALBUMIN: 3 G/DL (ref 3.5–5.2)
ANION GAP SERPL CALCULATED.3IONS-SCNC: 12 MMOL/L (ref 9–16)
BASOPHILS # BLD: 0.05 K/UL (ref 0–0.2)
BASOPHILS NFR BLD: 1 % (ref 0–2)
BUN SERPL-MCNC: 43 MG/DL (ref 8–23)
CALCIUM SERPL-MCNC: 8.6 MG/DL (ref 8.8–10.2)
CHLORIDE SERPL-SCNC: 101 MMOL/L (ref 98–107)
CK SERPL-CCNC: 126 U/L (ref 39–308)
CO2 SERPL-SCNC: 23 MMOL/L (ref 20–31)
CREAT SERPL-MCNC: 4.4 MG/DL (ref 0.7–1.2)
CRP SERPL HS-MCNC: 5.4 MG/L (ref 0–5)
EOSINOPHIL # BLD: 0.5 K/UL (ref 0–0.44)
EOSINOPHILS RELATIVE PERCENT: 5 % (ref 1–4)
ERYTHROCYTE [DISTWIDTH] IN BLOOD BY AUTOMATED COUNT: 13.2 % (ref 11.8–14.4)
ERYTHROCYTE [SEDIMENTATION RATE] IN BLOOD BY PHOTOMETRIC METHOD: 63 MM/HR (ref 0–20)
GFR, ESTIMATED: 14 ML/MIN/1.73M2
GLUCOSE BLD-MCNC: 113 MG/DL (ref 75–110)
GLUCOSE BLD-MCNC: 139 MG/DL (ref 75–110)
GLUCOSE BLD-MCNC: 159 MG/DL (ref 75–110)
GLUCOSE BLD-MCNC: 88 MG/DL (ref 75–110)
GLUCOSE SERPL-MCNC: 116 MG/DL (ref 82–115)
HCT VFR BLD AUTO: 33.2 % (ref 40.7–50.3)
HGB BLD-MCNC: 10.8 G/DL (ref 13–17)
IMM GRANULOCYTES # BLD AUTO: 0.09 K/UL (ref 0–0.3)
IMM GRANULOCYTES NFR BLD: 1 %
LYMPHOCYTES NFR BLD: 2.3 K/UL (ref 1.1–3.7)
LYMPHOCYTES RELATIVE PERCENT: 24 % (ref 24–43)
MCH RBC QN AUTO: 28.7 PG (ref 25.2–33.5)
MCHC RBC AUTO-ENTMCNC: 32.5 G/DL (ref 28.4–34.8)
MCV RBC AUTO: 88.3 FL (ref 82.6–102.9)
MICROORGANISM SPEC CULT: ABNORMAL
MICROORGANISM SPEC CULT: ABNORMAL
MICROORGANISM/AGENT SPEC: ABNORMAL
MICROORGANISM/AGENT SPEC: ABNORMAL
MONOCYTES NFR BLD: 0.87 K/UL (ref 0.1–1.2)
MONOCYTES NFR BLD: 9 % (ref 3–12)
NEUTROPHILS NFR BLD: 60 % (ref 36–65)
NEUTS SEG NFR BLD: 5.82 K/UL (ref 1.5–8.1)
NRBC BLD-RTO: 0 PER 100 WBC
PHOSPHATE SERPL-MCNC: 3.7 MG/DL (ref 2.5–4.5)
PLATELET # BLD AUTO: 214 K/UL (ref 138–453)
PMV BLD AUTO: 11.6 FL (ref 8.1–13.5)
POTASSIUM SERPL-SCNC: 4.7 MMOL/L (ref 3.7–5.3)
RBC # BLD AUTO: 3.76 M/UL (ref 4.21–5.77)
SERVICE CMNT-IMP: ABNORMAL
SODIUM SERPL-SCNC: 136 MMOL/L (ref 136–145)
SPECIMEN DESCRIPTION: ABNORMAL
WBC OTHER # BLD: 9.6 K/UL (ref 3.5–11.3)

## 2025-04-19 PROCEDURE — 6370000000 HC RX 637 (ALT 250 FOR IP)

## 2025-04-19 PROCEDURE — 86140 C-REACTIVE PROTEIN: CPT

## 2025-04-19 PROCEDURE — 6370000000 HC RX 637 (ALT 250 FOR IP): Performed by: EMERGENCY MEDICINE

## 2025-04-19 PROCEDURE — 2500000003 HC RX 250 WO HCPCS

## 2025-04-19 PROCEDURE — 36415 COLL VENOUS BLD VENIPUNCTURE: CPT

## 2025-04-19 PROCEDURE — 97530 THERAPEUTIC ACTIVITIES: CPT

## 2025-04-19 PROCEDURE — 82550 ASSAY OF CK (CPK): CPT

## 2025-04-19 PROCEDURE — 6370000000 HC RX 637 (ALT 250 FOR IP): Performed by: INTERNAL MEDICINE

## 2025-04-19 PROCEDURE — 6360000002 HC RX W HCPCS: Performed by: INTERNAL MEDICINE

## 2025-04-19 PROCEDURE — 99232 SBSQ HOSP IP/OBS MODERATE 35: CPT | Performed by: INTERNAL MEDICINE

## 2025-04-19 PROCEDURE — 85025 COMPLETE CBC W/AUTO DIFF WBC: CPT

## 2025-04-19 PROCEDURE — 82947 ASSAY GLUCOSE BLOOD QUANT: CPT

## 2025-04-19 PROCEDURE — 2500000003 HC RX 250 WO HCPCS: Performed by: INTERNAL MEDICINE

## 2025-04-19 PROCEDURE — 85652 RBC SED RATE AUTOMATED: CPT

## 2025-04-19 PROCEDURE — 1200000000 HC SEMI PRIVATE

## 2025-04-19 PROCEDURE — 80069 RENAL FUNCTION PANEL: CPT

## 2025-04-19 RX ORDER — LACTOBACILLUS RHAMNOSUS GG 10B CELL
1 CAPSULE ORAL
Qty: 30 CAPSULE | Refills: 1 | Status: SHIPPED | OUTPATIENT
Start: 2025-04-20

## 2025-04-19 RX ORDER — SODIUM CHLOR/HYPOCHLOROUS ACID 0.033 %
1 SOLUTION, IRRIGATION IRRIGATION PRN
Qty: 250 ML | Refills: 1 | Status: SHIPPED | OUTPATIENT
Start: 2025-04-19

## 2025-04-19 RX ORDER — AMLODIPINE BESYLATE 10 MG/1
10 TABLET ORAL DAILY
Qty: 30 TABLET | Refills: 3 | Status: SHIPPED | OUTPATIENT
Start: 2025-04-20

## 2025-04-19 RX ORDER — OXYCODONE AND ACETAMINOPHEN 5; 325 MG/1; MG/1
1 TABLET ORAL EVERY 4 HOURS PRN
Qty: 15 TABLET | Refills: 0 | Status: SHIPPED | OUTPATIENT
Start: 2025-04-19 | End: 2025-04-24

## 2025-04-19 RX ADMIN — HYDROXYZINE HYDROCHLORIDE 50 MG: 25 TABLET, FILM COATED ORAL at 19:51

## 2025-04-19 RX ADMIN — AMIODARONE HYDROCHLORIDE 200 MG: 200 TABLET ORAL at 08:31

## 2025-04-19 RX ADMIN — METRONIDAZOLE 500 MG: 500 TABLET ORAL at 06:32

## 2025-04-19 RX ADMIN — METOPROLOL 100 MG: 100 TABLET ORAL at 19:51

## 2025-04-19 RX ADMIN — ONDANSETRON 4 MG: 4 TABLET, ORALLY DISINTEGRATING ORAL at 21:42

## 2025-04-19 RX ADMIN — METRONIDAZOLE 500 MG: 500 TABLET ORAL at 14:17

## 2025-04-19 RX ADMIN — Medication 1000 UNITS: at 21:42

## 2025-04-19 RX ADMIN — WATER 2000 MG: 1 INJECTION INTRAMUSCULAR; INTRAVENOUS; SUBCUTANEOUS at 17:17

## 2025-04-19 RX ADMIN — SODIUM BICARBONATE 650 MG: 650 TABLET ORAL at 19:51

## 2025-04-19 RX ADMIN — OXYCODONE AND ACETAMINOPHEN 1 TABLET: 325; 5 TABLET ORAL at 17:17

## 2025-04-19 RX ADMIN — AMLODIPINE BESYLATE 10 MG: 10 TABLET ORAL at 08:32

## 2025-04-19 RX ADMIN — TRAZODONE HYDROCHLORIDE 50 MG: 50 TABLET ORAL at 19:53

## 2025-04-19 RX ADMIN — HYDROXYZINE HYDROCHLORIDE 50 MG: 25 TABLET, FILM COATED ORAL at 08:32

## 2025-04-19 RX ADMIN — DULOXETINE 30 MG: 30 CAPSULE, DELAYED RELEASE ORAL at 08:32

## 2025-04-19 RX ADMIN — SODIUM CHLORIDE, PRESERVATIVE FREE 10 ML: 5 INJECTION INTRAVENOUS at 08:31

## 2025-04-19 RX ADMIN — SODIUM BICARBONATE 650 MG: 650 TABLET ORAL at 08:31

## 2025-04-19 RX ADMIN — ZOLPIDEM TARTRATE 5 MG: 5 TABLET, FILM COATED ORAL at 21:42

## 2025-04-19 RX ADMIN — Medication 1 CAPSULE: at 08:31

## 2025-04-19 RX ADMIN — METOPROLOL 100 MG: 100 TABLET ORAL at 08:31

## 2025-04-19 RX ADMIN — SODIUM BICARBONATE 650 MG: 650 TABLET ORAL at 14:17

## 2025-04-19 RX ADMIN — SODIUM CHLORIDE, PRESERVATIVE FREE 10 ML: 5 INJECTION INTRAVENOUS at 19:52

## 2025-04-19 RX ADMIN — METRONIDAZOLE 500 MG: 500 TABLET ORAL at 21:42

## 2025-04-19 ASSESSMENT — PAIN DESCRIPTION - LOCATION: LOCATION: FOOT

## 2025-04-19 ASSESSMENT — PAIN DESCRIPTION - ORIENTATION: ORIENTATION: RIGHT

## 2025-04-19 ASSESSMENT — PAIN SCALES - GENERAL
PAINLEVEL_OUTOF10: 5
PAINLEVEL_OUTOF10: 2

## 2025-04-19 ASSESSMENT — PAIN DESCRIPTION - DESCRIPTORS: DESCRIPTORS: SORE;ACHING

## 2025-04-19 NOTE — DISCHARGE SUMMARY
[facility]  Office: [phone]  Aung Luis DO, Mansoor Cline DO, Josse Han DO, Gregory Archibald DO, Andrew Roberts MD, Mayra Medel MD, Ray Johnson MD, Romy Amador MD,  Mic Meraz MD, Rom Hayes MD, Casandra Washington MD,  Ely Burks DO, Dorie Anderson MD, Marcos Travis MD, Trey Luis DO, Delia Clemens MD,  Jorge Major DO, Rose Yi MD, Celia Guillen MD, Matthew Hyatt MD,  Sammy Galvez MD, Mary Kay Karimi MD, Edson Hay MD, Mateo uTrner MD, Stan Cline MD, Radha Hoskins MD, Mikael Cm DO, Virgie Josue MD, Eran Mckenzie MD, Ely Blackman MD, Mohsin Reza, MD, George Castaneda MD, Shirley Waterhouse, CNP,  Missy Chowdhury, CNP, Mikael Cai, CNP,  Shantelle Monson, DNP, Betty Boles, CNP, Yany Garcia, CNP, Cathie Law, CNP, Lynda Russell, CNP, An King, PA-C, Gabby Vaughn, CNP, Saray Patton, CNP,  Lacey Wilkinson, CNP, Nidhi Sinha, CNP, Hank Gilbert, PA-C, Jaclyn Neff, CNP,  Patsy Peterson, CNS, Zulay Ramsay, CNP, Martha Prince, CNP,   Mariela Benedict, CNP         [facility]   IN-PATIENT SERVICE   [facility]    Discharge Summary     Patient ID: Prateek Denton II  :  1960   MRN: [id number]     ACCOUNT:  [id number]   Patient's PCP: Lisseth Coello APRN - CNP  Admit Date: 4/10/2025   Discharge Date: 2025     Length of Stay: 11  Code Status:  Full Code  Admitting Physician: Josse Han DO  Discharge Physician: Josse Han DO     Active Discharge Diagnoses:     Hospital Problem Lists:  Principal Problem:    Atrial flutter with rapid ventricular response (HCC)  Active Problems:    Essential hypertension    Type 2 diabetes mellitus with diabetic nephropathy, with long-term current use of insulin (HCC)    Acute kidney injury superimposed on CKD    Hyperkalemia    Elevated serum creatinine    Sepsis (HCC)    Secondary hypercoagulable state    Diabetic ulcer of right heel

## 2025-04-20 DIAGNOSIS — J98.01 BRONCHOSPASM: ICD-10-CM

## 2025-04-20 DIAGNOSIS — J40 BRONCHITIS: ICD-10-CM

## 2025-04-20 DIAGNOSIS — R06.02 SOB (SHORTNESS OF BREATH): ICD-10-CM

## 2025-04-20 LAB
BASOPHILS # BLD: 0.05 K/UL (ref 0–0.2)
BASOPHILS NFR BLD: 1 % (ref 0–2)
CRP SERPL HS-MCNC: 5 MG/L (ref 0–5)
EOSINOPHIL # BLD: 0.49 K/UL (ref 0–0.44)
EOSINOPHILS RELATIVE PERCENT: 5 % (ref 1–4)
ERYTHROCYTE [DISTWIDTH] IN BLOOD BY AUTOMATED COUNT: 13.4 % (ref 11.8–14.4)
ERYTHROCYTE [SEDIMENTATION RATE] IN BLOOD BY PHOTOMETRIC METHOD: 41 MM/HR (ref 0–20)
GLUCOSE BLD-MCNC: 123 MG/DL (ref 75–110)
GLUCOSE BLD-MCNC: 129 MG/DL (ref 75–110)
GLUCOSE BLD-MCNC: 148 MG/DL (ref 75–110)
GLUCOSE BLD-MCNC: 75 MG/DL (ref 75–110)
HCT VFR BLD AUTO: 30.3 % (ref 40.7–50.3)
HGB BLD-MCNC: 9.7 G/DL (ref 13–17)
IMM GRANULOCYTES # BLD AUTO: 0.07 K/UL (ref 0–0.3)
IMM GRANULOCYTES NFR BLD: 1 %
LYMPHOCYTES NFR BLD: 2.4 K/UL (ref 1.1–3.7)
LYMPHOCYTES RELATIVE PERCENT: 26 % (ref 24–43)
MCH RBC QN AUTO: 28.8 PG (ref 25.2–33.5)
MCHC RBC AUTO-ENTMCNC: 32 G/DL (ref 28.4–34.8)
MCV RBC AUTO: 89.9 FL (ref 82.6–102.9)
MONOCYTES NFR BLD: 0.85 K/UL (ref 0.1–1.2)
MONOCYTES NFR BLD: 9 % (ref 3–12)
NEUTROPHILS NFR BLD: 58 % (ref 36–65)
NEUTS SEG NFR BLD: 5.23 K/UL (ref 1.5–8.1)
NRBC BLD-RTO: 0 PER 100 WBC
PLATELET # BLD AUTO: 187 K/UL (ref 138–453)
PMV BLD AUTO: 11.1 FL (ref 8.1–13.5)
RBC # BLD AUTO: 3.37 M/UL (ref 4.21–5.77)
WBC OTHER # BLD: 9.1 K/UL (ref 3.5–11.3)

## 2025-04-20 PROCEDURE — 86140 C-REACTIVE PROTEIN: CPT

## 2025-04-20 PROCEDURE — 82947 ASSAY GLUCOSE BLOOD QUANT: CPT

## 2025-04-20 PROCEDURE — 6370000000 HC RX 637 (ALT 250 FOR IP)

## 2025-04-20 PROCEDURE — 2500000003 HC RX 250 WO HCPCS: Performed by: INTERNAL MEDICINE

## 2025-04-20 PROCEDURE — 85652 RBC SED RATE AUTOMATED: CPT

## 2025-04-20 PROCEDURE — 6360000002 HC RX W HCPCS: Performed by: INTERNAL MEDICINE

## 2025-04-20 PROCEDURE — 36415 COLL VENOUS BLD VENIPUNCTURE: CPT

## 2025-04-20 PROCEDURE — 6370000000 HC RX 637 (ALT 250 FOR IP): Performed by: INTERNAL MEDICINE

## 2025-04-20 PROCEDURE — 6360000002 HC RX W HCPCS

## 2025-04-20 PROCEDURE — 85025 COMPLETE CBC W/AUTO DIFF WBC: CPT

## 2025-04-20 PROCEDURE — 6370000000 HC RX 637 (ALT 250 FOR IP): Performed by: EMERGENCY MEDICINE

## 2025-04-20 PROCEDURE — 2500000003 HC RX 250 WO HCPCS

## 2025-04-20 PROCEDURE — 99232 SBSQ HOSP IP/OBS MODERATE 35: CPT | Performed by: INTERNAL MEDICINE

## 2025-04-20 PROCEDURE — 2580000003 HC RX 258

## 2025-04-20 PROCEDURE — 1200000000 HC SEMI PRIVATE

## 2025-04-20 RX ORDER — ALBUTEROL SULFATE 90 UG/1
2 INHALANT RESPIRATORY (INHALATION) 4 TIMES DAILY PRN
Qty: 6.7 EACH | Refills: 0 | Status: SHIPPED | OUTPATIENT
Start: 2025-04-20

## 2025-04-20 RX ORDER — LOPERAMIDE HYDROCHLORIDE 2 MG/1
2 CAPSULE ORAL 4 TIMES DAILY PRN
Status: DISCONTINUED | OUTPATIENT
Start: 2025-04-20 | End: 2025-04-21 | Stop reason: HOSPADM

## 2025-04-20 RX ADMIN — DULOXETINE 30 MG: 30 CAPSULE, DELAYED RELEASE ORAL at 08:30

## 2025-04-20 RX ADMIN — LOPERAMIDE HYDROCHLORIDE 2 MG: 2 CAPSULE ORAL at 22:46

## 2025-04-20 RX ADMIN — AMIODARONE HYDROCHLORIDE 200 MG: 200 TABLET ORAL at 08:30

## 2025-04-20 RX ADMIN — SODIUM CHLORIDE, PRESERVATIVE FREE 10 ML: 5 INJECTION INTRAVENOUS at 20:47

## 2025-04-20 RX ADMIN — DAPTOMYCIN 750 MG: 500 INJECTION, POWDER, LYOPHILIZED, FOR SOLUTION INTRAVENOUS at 16:30

## 2025-04-20 RX ADMIN — HYDROXYZINE HYDROCHLORIDE 50 MG: 25 TABLET, FILM COATED ORAL at 20:46

## 2025-04-20 RX ADMIN — METRONIDAZOLE 500 MG: 500 TABLET ORAL at 14:36

## 2025-04-20 RX ADMIN — METRONIDAZOLE 500 MG: 500 TABLET ORAL at 20:46

## 2025-04-20 RX ADMIN — SODIUM BICARBONATE 650 MG: 650 TABLET ORAL at 20:46

## 2025-04-20 RX ADMIN — LOPERAMIDE HYDROCHLORIDE 2 MG: 2 CAPSULE ORAL at 16:26

## 2025-04-20 RX ADMIN — METOPROLOL 100 MG: 100 TABLET ORAL at 08:30

## 2025-04-20 RX ADMIN — AMLODIPINE BESYLATE 10 MG: 10 TABLET ORAL at 08:30

## 2025-04-20 RX ADMIN — ZOLPIDEM TARTRATE 5 MG: 5 TABLET, FILM COATED ORAL at 21:00

## 2025-04-20 RX ADMIN — HYDROXYZINE HYDROCHLORIDE 50 MG: 25 TABLET, FILM COATED ORAL at 08:30

## 2025-04-20 RX ADMIN — Medication 1 CAPSULE: at 08:30

## 2025-04-20 RX ADMIN — SODIUM BICARBONATE 650 MG: 650 TABLET ORAL at 14:36

## 2025-04-20 RX ADMIN — LOPERAMIDE HYDROCHLORIDE 2 MG: 2 CAPSULE ORAL at 14:36

## 2025-04-20 RX ADMIN — SODIUM CHLORIDE, PRESERVATIVE FREE 10 ML: 5 INJECTION INTRAVENOUS at 08:30

## 2025-04-20 RX ADMIN — METRONIDAZOLE 500 MG: 500 TABLET ORAL at 06:10

## 2025-04-20 RX ADMIN — METOPROLOL 100 MG: 100 TABLET ORAL at 20:46

## 2025-04-20 RX ADMIN — WATER 2000 MG: 1 INJECTION INTRAMUSCULAR; INTRAVENOUS; SUBCUTANEOUS at 16:26

## 2025-04-20 RX ADMIN — SODIUM BICARBONATE 650 MG: 650 TABLET ORAL at 08:30

## 2025-04-20 RX ADMIN — Medication 1000 UNITS: at 20:46

## 2025-04-20 RX ADMIN — TRAZODONE HYDROCHLORIDE 50 MG: 50 TABLET ORAL at 20:46

## 2025-04-21 VITALS
TEMPERATURE: 98.1 F | RESPIRATION RATE: 16 BRPM | HEART RATE: 66 BPM | BODY MASS INDEX: 36.4 KG/M2 | DIASTOLIC BLOOD PRESSURE: 77 MMHG | HEIGHT: 71 IN | SYSTOLIC BLOOD PRESSURE: 132 MMHG | OXYGEN SATURATION: 98 % | WEIGHT: 260 LBS

## 2025-04-21 LAB
ALBUMIN: 3.3 G/DL (ref 3.5–5.2)
ANION GAP SERPL CALCULATED.3IONS-SCNC: 13 MMOL/L (ref 9–16)
BASOPHILS # BLD: 0.04 K/UL (ref 0–0.2)
BASOPHILS NFR BLD: 0 % (ref 0–2)
BUN SERPL-MCNC: 48 MG/DL (ref 8–23)
CALCIUM SERPL-MCNC: 9 MG/DL (ref 8.8–10.2)
CHLORIDE SERPL-SCNC: 104 MMOL/L (ref 98–107)
CO2 SERPL-SCNC: 22 MMOL/L (ref 20–31)
CREAT SERPL-MCNC: 4.8 MG/DL (ref 0.7–1.2)
CRP SERPL HS-MCNC: 5.1 MG/L (ref 0–5)
EOSINOPHIL # BLD: 0.45 K/UL (ref 0–0.44)
EOSINOPHILS RELATIVE PERCENT: 5 % (ref 1–4)
ERYTHROCYTE [DISTWIDTH] IN BLOOD BY AUTOMATED COUNT: 13.6 % (ref 11.8–14.4)
ERYTHROCYTE [SEDIMENTATION RATE] IN BLOOD BY PHOTOMETRIC METHOD: 26 MM/HR (ref 0–20)
GFR, ESTIMATED: 13 ML/MIN/1.73M2
GLUCOSE BLD-MCNC: 110 MG/DL (ref 75–110)
GLUCOSE BLD-MCNC: 117 MG/DL (ref 75–110)
GLUCOSE BLD-MCNC: 78 MG/DL (ref 75–110)
GLUCOSE SERPL-MCNC: 107 MG/DL (ref 82–115)
HCT VFR BLD AUTO: 29.8 % (ref 40.7–50.3)
HGB BLD-MCNC: 9.7 G/DL (ref 13–17)
IMM GRANULOCYTES # BLD AUTO: 0.1 K/UL (ref 0–0.3)
IMM GRANULOCYTES NFR BLD: 1 %
LYMPHOCYTES NFR BLD: 2.42 K/UL (ref 1.1–3.7)
LYMPHOCYTES RELATIVE PERCENT: 26 % (ref 24–43)
MCH RBC QN AUTO: 29 PG (ref 25.2–33.5)
MCHC RBC AUTO-ENTMCNC: 32.6 G/DL (ref 28.4–34.8)
MCV RBC AUTO: 89.2 FL (ref 82.6–102.9)
MONOCYTES NFR BLD: 0.87 K/UL (ref 0.1–1.2)
MONOCYTES NFR BLD: 10 % (ref 3–12)
NEUTROPHILS NFR BLD: 58 % (ref 36–65)
NEUTS SEG NFR BLD: 5.3 K/UL (ref 1.5–8.1)
NRBC BLD-RTO: 0 PER 100 WBC
PHOSPHATE SERPL-MCNC: 4.8 MG/DL (ref 2.5–4.5)
PLATELET # BLD AUTO: 188 K/UL (ref 138–453)
PMV BLD AUTO: 11.1 FL (ref 8.1–13.5)
POTASSIUM SERPL-SCNC: 5.3 MMOL/L (ref 3.7–5.3)
RBC # BLD AUTO: 3.34 M/UL (ref 4.21–5.77)
SODIUM SERPL-SCNC: 139 MMOL/L (ref 136–145)
WBC OTHER # BLD: 9.2 K/UL (ref 3.5–11.3)

## 2025-04-21 PROCEDURE — 97116 GAIT TRAINING THERAPY: CPT

## 2025-04-21 PROCEDURE — 99231 SBSQ HOSP IP/OBS SF/LOW 25: CPT | Performed by: INTERNAL MEDICINE

## 2025-04-21 PROCEDURE — 6370000000 HC RX 637 (ALT 250 FOR IP)

## 2025-04-21 PROCEDURE — 6370000000 HC RX 637 (ALT 250 FOR IP): Performed by: INTERNAL MEDICINE

## 2025-04-21 PROCEDURE — 2500000003 HC RX 250 WO HCPCS: Performed by: INTERNAL MEDICINE

## 2025-04-21 PROCEDURE — 86140 C-REACTIVE PROTEIN: CPT

## 2025-04-21 PROCEDURE — 85652 RBC SED RATE AUTOMATED: CPT

## 2025-04-21 PROCEDURE — 36415 COLL VENOUS BLD VENIPUNCTURE: CPT

## 2025-04-21 PROCEDURE — 97110 THERAPEUTIC EXERCISES: CPT

## 2025-04-21 PROCEDURE — 6370000000 HC RX 637 (ALT 250 FOR IP): Performed by: EMERGENCY MEDICINE

## 2025-04-21 PROCEDURE — 85025 COMPLETE CBC W/AUTO DIFF WBC: CPT

## 2025-04-21 PROCEDURE — 99232 SBSQ HOSP IP/OBS MODERATE 35: CPT | Performed by: INTERNAL MEDICINE

## 2025-04-21 PROCEDURE — 80069 RENAL FUNCTION PANEL: CPT

## 2025-04-21 PROCEDURE — 6360000002 HC RX W HCPCS: Performed by: INTERNAL MEDICINE

## 2025-04-21 PROCEDURE — 97535 SELF CARE MNGMENT TRAINING: CPT

## 2025-04-21 PROCEDURE — 2500000003 HC RX 250 WO HCPCS

## 2025-04-21 PROCEDURE — 97530 THERAPEUTIC ACTIVITIES: CPT

## 2025-04-21 PROCEDURE — 82947 ASSAY GLUCOSE BLOOD QUANT: CPT

## 2025-04-21 RX ORDER — ZOLPIDEM TARTRATE 5 MG/1
5 TABLET ORAL NIGHTLY PRN
Qty: 14 TABLET | Refills: 0 | Status: SHIPPED | OUTPATIENT
Start: 2025-04-21 | End: 2025-05-05

## 2025-04-21 RX ADMIN — SODIUM BICARBONATE 650 MG: 650 TABLET ORAL at 13:16

## 2025-04-21 RX ADMIN — Medication 1 CAPSULE: at 09:31

## 2025-04-21 RX ADMIN — METOPROLOL 100 MG: 100 TABLET ORAL at 09:31

## 2025-04-21 RX ADMIN — METRONIDAZOLE 500 MG: 500 TABLET ORAL at 05:46

## 2025-04-21 RX ADMIN — METRONIDAZOLE 500 MG: 500 TABLET ORAL at 13:15

## 2025-04-21 RX ADMIN — AMLODIPINE BESYLATE 10 MG: 10 TABLET ORAL at 09:31

## 2025-04-21 RX ADMIN — HYDROXYZINE HYDROCHLORIDE 50 MG: 25 TABLET, FILM COATED ORAL at 09:31

## 2025-04-21 RX ADMIN — DULOXETINE 30 MG: 30 CAPSULE, DELAYED RELEASE ORAL at 09:31

## 2025-04-21 RX ADMIN — SODIUM CHLORIDE, PRESERVATIVE FREE 10 ML: 5 INJECTION INTRAVENOUS at 09:31

## 2025-04-21 RX ADMIN — OXYCODONE AND ACETAMINOPHEN 2 TABLET: 325; 5 TABLET ORAL at 13:15

## 2025-04-21 RX ADMIN — SODIUM BICARBONATE 650 MG: 650 TABLET ORAL at 09:31

## 2025-04-21 RX ADMIN — AMIODARONE HYDROCHLORIDE 200 MG: 200 TABLET ORAL at 09:31

## 2025-04-21 RX ADMIN — WATER 2000 MG: 1 INJECTION INTRAMUSCULAR; INTRAVENOUS; SUBCUTANEOUS at 17:04

## 2025-04-21 ASSESSMENT — ENCOUNTER SYMPTOMS
RESPIRATORY NEGATIVE: 1
GASTROINTESTINAL NEGATIVE: 1

## 2025-04-21 ASSESSMENT — PAIN SCALES - GENERAL: PAINLEVEL_OUTOF10: 8

## 2025-04-21 NOTE — CARE COORDINATION
DC Planning    Spoke w pt, he received phone call from [facility] and they will not be able to deliver wound vac until Tuesday. Primary nurse did speak w podiatry and pt can dc w wet to dry dressings BID. Discussed w pt. C cannot come twice day for dressing changes. Pt cannot reach wound himself dt weight barrier.     Asked if his wife or family can help and relays his wife is very squeamish and will pass out w any medical stuff.     Pt currently has wound vac in place-may have to wait till Monday for dc until wound vac is secured.       
Discharge planning    Call back from Lori and patient has approved as there was an email that patient does have Part B she was not include on that email.    Spoke with wife and patient/ patient was told that wound vac is being delivered tomorrow. Had Joss RHODES reach out to podiatry and change VAC dressing changes to T-TH-S.     Spoke with shameka and will arrange a nurse for tomorrow at 1600 to instruct on IV atb and to start wound vac.   
Discharge planning    PT to eval. Went to OR for surgery this morning. Debridement on right posterior ankle. NWB right leg. Temporary HD. Has had [facility] in the past. Lives with spouse and independent PTA. Has had [facility] in the past. [facility] following for possible IV atx.  
Discharge planning    Patient lives with wife. Independent PTA> has CPAP. He is current with [facility]    Patient s/p debridement of right ankle. NWB RLE> wound vac placed and [facility] following. Plan is delivery of wound vac on Tuesday and can discharge on Wet to Dry dressings BID> but they do not have anyone who can do the dressing.     ID notes dapto and ceftriaxone thru 5/18/25    WILL NEED:  LINE  RX FOR ATB.   START OF CARE ARRANGED WITH [facility] AND [facility].       1120  Call to Lori at [facility] and patient MEDICARE PART B is INACTIVE> they will need to do a joby case application.     Spoke with patient. Discussed options. He is over income for their joby application.     Gave him medicare number and he is going to call to see if can a late enrollment and discussed would have monthly deduction and may accrue late payment which would still be more cost effective as renting a wound vac is 125 per day.     Discussed LTAC and if qualifies for the rev codes. He is requesting that referral be sent to [facility]. Updated SS and she will also send referral to [facility] as back up plan.     Call to soy and she will work up.     1158  Spoke to Olena at [facility] and medications have been delivered in error. Patient will not be charged if goes to LTAC> if goes home he will have meds already in the home.     1328  Patient accepted at [facility] and [facility]. He wants to discuss with his wife.     
Discharge planning    Pt. Scheduled to go to OR for wound vac placement. Will go back to OR on Thursday or Friday for further debridement.    Writer notified Lori from [facility] and faxed demographic, H&P and OP note. Put what was faxed in patient's chart.    There is a [facility] form in the chart as well, that podiatry will need to fill out once surgeries are completed.     See nephrology's note from 4/15. Pt. Will need to follow up with Nephrology o/p so they can closely monitor the pt.for labs and monthly visits.     Writer spoke with pt. In regards to discharge planning. Pt. Wants to go home with wife and is current with [facility]. They accepted back. Will inform Shannan ([facility]) today of wound vac placement.   
Transitional Planning:  RX for IV ABX faxed to [facility].  Referral placed.  PS to podiatry to determine if patient is discharging home with wound vac.  Informed podiatry that paperwork for wound vac is in chart and needs to be filled out.    12:00 Spoke with Olena at [facility].  She did not get ABX order & needs ad recent CK level.  PS attending for level.  RX refaxed.    12:37  [facility] wound vac order faxed to [facility].  Call to Lori at [facility].  Lori out of office today, vm to go to Cathy who is covering.  Call to Cathy at [phone].  Reached .  Asked for cb.    Spoke with O[facility]a at [facility].  She will have the meds ready for tomorrow.  If patient leaves today, she will need to know.    Spoke with Angela at [facility].  Once insurance is verified they will let us know if we can get delivery today.    14:34  Left message with Shannan at [facility]. Informed her we are awaiting a wound vac for discharge.    15:39  Spoke with Angela at [facility].  Copy of insurance card faxed to them as they were unable to verify him in system.    16:20  Spoke with Angela at [facility].  Insurance is in corporate and being reviewed.   Contact for weekend is Lori Blair [phone].  Informed Ny at [facility].  Med is ready for pack and hold.  Main number can be called & they will get in touch with on call person if patient d/c.  Called Shannan at [facility] & informed.  
Transitional Planning:  Spoke with Shannan klein [facility].  Patient is current and they can accept back at discharge.  
vomiting type [R11.2]    IF APPLICABLE: The Patient and/or patient representative Prateek and his family were provided with a choice of provider and agrees with the discharge plan. Freedom of choice list with basic dialogue that supports the patient's individualized plan of care/goals and shares the quality data associated with the providers was provided to: Patient   Patient Representative Name:       The Patient and/or Patient Representative Agree with the Discharge Plan? Yes    RTISTAN PALMA RN  Case Management Department

## 2025-04-21 NOTE — PROGRESS NOTES
[facility]    Shaukat Rashid MD Zafar Magsi MD Danial Rashid MD John O. Ranker MD Sembria Ligibel, BRIT       Nephrology Consultation Note    Reason for Consult:    Date of Service: 04/12/25   PCP: Lisseth Coello, TORREY - CNP       Reason for Consult     DEBBI / CKD     Assessment and Plan   65-year-old male with past medical history significant for CKD stage 3b / IV, hypertension, type 2 diabetes mellitus, morbid obesity, paroxysmal atrial fibrillation, history of GI bleeding, and left nephrectomy, presented to the ED with nausea, vomiting, dizziness, and lightheadedness & decreased oral intake and new-onset atrial flutter with rapid ventricular response. He had recent podiatric surgery (right heel spur removal on 3/31/25) and was on oxacillin, discontinued 4/6. Patient noted purulent drainage and foul odor from the right heel wound. In ED, he was found to be tachycardic (), hypotensive, with acute kidney injury (Cr 5.1, , GFR 12), hyponatremia (Na 130), hyperkalemia (K 5.7), and metabolic acidosis (CO? 12, AG 20). Urinalysis showed 3+ proteinuria, 2+ hemoglobin, and casts suggestive of tubular injury. Imaging indicated right calcaneal osteomyelitis. Nephrology was consulted for evaluation of DEBBI on CKD    Assessment   DEBBI on CKD 3b/IV, Possibly prolonged pre-renal leading to ATN   Single kidney (left nephrectomy 2020)  Hyperkalemia  Metabolic acidosis   Hypertension  Hemodynamics : Recent Echo showing Left Ventricle: Normal left ventricular systolic function with a visually estimated EF of 55 - 60%.   Right heel surgical wound dehiscence with associated soft tissue infection  Calcaneal osteomyelitis  Atrial flutter with rapid ventricular response.  T2DM (not on current hypoglycemics)  Osteomyelitis (suspected, right heel)  Morbid obesity  History of GI bleeding (2022, 2023)  LISA (on CPAP)  History of Gout    Plan  HD today for acidosis, no UF, 4K/2.25 
    [facility]    Shaukat Rashid MD Zafar Magsi MD Danial Rashid MD John O. Ranker MD Sembria Ligibel, CNP       Nephrology Progress Note     Reason for Consult:    Date of Service: 04/16/25   PCP: Lisseth Coello, TORREY - CNP       Reason for Consult     DEBBI / CKD     Assessment and Plan   65-year-old male with past medical history significant for CKD stage 3b / IV, hypertension, type 2 diabetes mellitus, morbid obesity, paroxysmal atrial fibrillation, history of GI bleeding, and left nephrectomy, presented to the ED with nausea, vomiting, dizziness, and lightheadedness & decreased oral intake and new-onset atrial flutter with rapid ventricular response. He had recent podiatric surgery (right heel spur removal on 3/31/25) and was on oxacillin, discontinued 4/6. Patient noted purulent drainage and foul odor from the right heel wound. In ED, he was found to be tachycardic (), hypotensive, with acute kidney injury (Cr 5.1, , GFR 12), hyponatremia (Na 130), hyperkalemia (K 5.7), and metabolic acidosis (CO? 12, AG 20). Urinalysis showed 3+ proteinuria, 2+ hemoglobin, and casts suggestive of tubular injury. Imaging indicated right calcaneal osteomyelitis. Nephrology was consulted for evaluation of DEBBI on CKD    Assessment   DEBBI on CKD 3b/IV, Possibly prolonged pre-renal leading to ATN   Single kidney (left nephrectomy 2020)  Hyperkalemia  Metabolic acidosis   Hypertension  Hemodynamics : Recent Echo showing Left Ventricle: Normal left ventricular systolic function with a visually estimated EF of 55 - 60%.   Right heel surgical wound dehiscence with associated soft tissue infection  Calcaneal osteomyelitis  Atrial flutter with rapid ventricular response.  T2DM (not on current hypoglycemics)  Osteomyelitis (suspected, right heel)  Morbid obesity  History of GI bleeding (2022, 2023)  LISA (on CPAP)  History of Gout    Plan  Patient renal function holding up, no plan 
    [facility]    Shaukat Rashid MD Zafar Magsi MD Danial Rashid MD John O. Ranker MD Sembria Ligibel, CNP       Nephrology Progress Note     Reason for Consult:    Date of Service: 04/17/25   PCP: Lisseth Coello, TORREY - CNP       Reason for Consult     DEBBI / CKD     Assessment and Plan   65-year-old male with past medical history significant for CKD stage 3b / IV, hypertension, type 2 diabetes mellitus, morbid obesity, paroxysmal atrial fibrillation, history of GI bleeding, and left nephrectomy, presented to the ED with nausea, vomiting, dizziness, and lightheadedness & decreased oral intake and new-onset atrial flutter with rapid ventricular response. He had recent podiatric surgery (right heel spur removal on 3/31/25) and was on oxacillin, discontinued 4/6. Patient noted purulent drainage and foul odor from the right heel wound. In ED, he was found to be tachycardic (), hypotensive, with acute kidney injury (Cr 5.1, , GFR 12), hyponatremia (Na 130), hyperkalemia (K 5.7), and metabolic acidosis (CO? 12, AG 20). Urinalysis showed 3+ proteinuria, 2+ hemoglobin, and casts suggestive of tubular injury. Imaging indicated right calcaneal osteomyelitis. Nephrology was consulted for evaluation of DEBBI on CKD    Assessment   DEBBI on CKD 3b/IV, Possibly prolonged pre-renal leading to ATN   Single kidney (left nephrectomy 2020)  Hyperkalemia  Metabolic acidosis   Hypertension  Hemodynamics : Recent Echo showing Left Ventricle: Normal left ventricular systolic function with a visually estimated EF of 55 - 60%.   Right heel surgical wound dehiscence with associated soft tissue infection  Calcaneal osteomyelitis  Atrial flutter with rapid ventricular response.  T2DM (not on current hypoglycemics)  Osteomyelitis (suspected, right heel)  Morbid obesity  History of GI bleeding (2022, 2023)  LISA (on CPAP)  History of Gout    Plan  Remove dialysis catheter, no need for 
    [facility]    Shaukat Rashid MD Zafar Magsi MD Danial Rashid MD John O. Ranker MD Sembria Ligibel, CNP       Nephrology Progress Note     Reason for Consult:    Date of Service: 04/18/25   PCP: Lisseth Coello, TORREY - CNP       Reason for Consult     DEBBI / CKD     Assessment and Plan   65-year-old male with past medical history significant for CKD stage 3b / IV, hypertension, type 2 diabetes mellitus, morbid obesity, paroxysmal atrial fibrillation, history of GI bleeding, and left nephrectomy, presented to the ED with nausea, vomiting, dizziness, and lightheadedness & decreased oral intake and new-onset atrial flutter with rapid ventricular response. He had recent podiatric surgery (right heel spur removal on 3/31/25) and was on oxacillin, discontinued 4/6. Patient noted purulent drainage and foul odor from the right heel wound. In ED, he was found to be tachycardic (), hypotensive, with acute kidney injury (Cr 5.1, , GFR 12), hyponatremia (Na 130), hyperkalemia (K 5.7), and metabolic acidosis (CO? 12, AG 20). Urinalysis showed 3+ proteinuria, 2+ hemoglobin, and casts suggestive of tubular injury. Imaging indicated right calcaneal osteomyelitis. Nephrology was consulted for evaluation of DEBBI on CKD    Assessment   DEBBI on CKD 3b/IV, Possibly prolonged pre-renal leading to ATN   Single kidney (left nephrectomy 2020)  Hyperkalemia  Metabolic acidosis   Hypertension  Hemodynamics : Recent Echo showing Left Ventricle: Normal left ventricular systolic function with a visually estimated EF of 55 - 60%.   Right heel surgical wound dehiscence with associated soft tissue infection  Calcaneal osteomyelitis  Atrial flutter with rapid ventricular response.  T2DM (not on current hypoglycemics)  Osteomyelitis (suspected, right heel)  Morbid obesity  History of GI bleeding (2022, 2023)  LISA (on CPAP)  History of Gout    Plan  No further need for hemodialysis at this 
    [facility]    Shaukat Rashid MD Zafar Magsi MD Danial Rashid MD John O. Ranker MD Sembria Ligibel, CNP       Nephrology Progress Note     Reason for Consult:    Date of Service: 04/19/25   PCP: Lisseth Coello, TORREY - CNP       Reason for Consult     DEBBI / CKD     Assessment and Plan   65-year-old male with past medical history significant for CKD stage 3b / IV, hypertension, type 2 diabetes mellitus, morbid obesity, paroxysmal atrial fibrillation, history of GI bleeding, and left nephrectomy, presented to the ED with nausea, vomiting, dizziness, and lightheadedness & decreased oral intake and new-onset atrial flutter with rapid ventricular response. He had recent podiatric surgery (right heel spur removal on 3/31/25) and was on oxacillin, discontinued 4/6. Patient noted purulent drainage and foul odor from the right heel wound. In ED, he was found to be tachycardic (), hypotensive, with acute kidney injury (Cr 5.1, , GFR 12), hyponatremia (Na 130), hyperkalemia (K 5.7), and metabolic acidosis (CO? 12, AG 20). Urinalysis showed 3+ proteinuria, 2+ hemoglobin, and casts suggestive of tubular injury. Imaging indicated right calcaneal osteomyelitis. Nephrology was consulted for evaluation of DEBBI on CKD    Assessment   DEBBI on CKD 3b/IV, Possibly prolonged pre-renal leading to ATN   Single kidney (left nephrectomy 2020)  Hyperkalemia  Metabolic acidosis   Hypertension  Hemodynamics : Recent Echo showing Left Ventricle: Normal left ventricular systolic function with a visually estimated EF of 55 - 60%.   Right heel surgical wound dehiscence with associated soft tissue infection  Calcaneal osteomyelitis  Atrial flutter with rapid ventricular response.  T2DM (not on current hypoglycemics)  Osteomyelitis (suspected, right heel)  Morbid obesity  History of GI bleeding (2022, 2023)  LISA (on CPAP)  History of Gout    Plan  No further need for hemodialysis at this 
    [facility]    Shaukat Rashid MD Zafar Magsi MD Danial Rashid MD John O. Ranker MD Sembria Ligibel, CNP       Nephrology Progress Note     Reason for Consult:    Date of Service: 04/21/25   PCP: Lisseth Coello, TORREY - CNP       Reason for Consult     DEBBI / CKD     Assessment and Plan   65-year-old male with past medical history significant for CKD stage 3b / IV, hypertension, type 2 diabetes mellitus, morbid obesity, paroxysmal atrial fibrillation, history of GI bleeding, and left nephrectomy, presented to the ED with nausea, vomiting, dizziness, and lightheadedness & decreased oral intake and new-onset atrial flutter with rapid ventricular response. He had recent podiatric surgery (right heel spur removal on 3/31/25) and was on oxacillin, discontinued 4/6. Patient noted purulent drainage and foul odor from the right heel wound. In ED, he was found to be tachycardic (), hypotensive, with acute kidney injury (Cr 5.1, , GFR 12), hyponatremia (Na 130), hyperkalemia (K 5.7), and metabolic acidosis (CO? 12, AG 20). Urinalysis showed 3+ proteinuria, 2+ hemoglobin, and casts suggestive of tubular injury. Imaging indicated right calcaneal osteomyelitis. Nephrology was consulted for evaluation of DEBBI on CKD    Assessment   DEBBI on CKD stage IV/stage V possibly prolonged pre-renal leading to ATN, progressive renal failure  History of single kidney (left nephrectomy 2020)  Hyperkalemia on admission  Metabolic acidosis on admission  Hypertension  Hemodynamics : Recent Echo showing Left Ventricle: Normal left ventricular systolic function with a visually estimated EF of 55 - 60%.   Right heel surgical wound dehiscence with associated soft tissue infection  Calcaneal osteomyelitis  Atrial flutter with rapid ventricular response.  T2DM (not on current hypoglycemics)  Osteomyelitis (suspected, right heel)  Morbid obesity  History of GI bleeding (2022, 2023)  LISA (on 
  [facility]  Progress Note    Prateek Denton II  MRN: [id number]  Date: 4/18/2025  LOS: 8     Reason for F/U :   Right heel/calcaneal ulcer with osteomyelitis    Impression :   Right heel surgical wound dehiscence with associated soft tissue infection and calcaneal osteomyelitis  Status post I&D 4/14/2025  Wound cultures did have E. coli and MRSA isolated  Atrial fibrillation with rapid ventricular response  Acute kidney injury on chronic kidney disease stage IV likely secondary to prerenal disease/dehydration  Started on hemodialysis  Diabetes mellitus type 2 with associated neuropathy  Essential hypertension    Recommendations:   The patient will continue intravenous antimicrobial therapy with ceftriaxone and daptomycin through 5/18/2025  Continue local wound care with the wound VAC  The patient will need a tunneled PICC line in place given the chronic kidney disease  The patient is okay for discharge once arrangements have been made and the patient can follow-up with me in the office in 1 month    Infection Control Recommendations:   Contact precaution    Discharge Planning:   Estimated Length of IV antimicrobials: 5/18/2025  Patient will need a tunneled PICC line Insertion  Patient will need: Home IV  Patient will need outpatient wound care: Yes    Medical Decision making / Summary of Stay:   Prateek Denton II is a 65 y.o.-year-old male who was initially admitted on 4/10/2025.      Prateek has diabetes mellitus type 2 with associated peripheral neuropathy, chronic kidney disease stage IV, single kidney since 2020 [left-sided nephrectomy], hypertension, hyperlipidemia, atrial fibrillation, anemia     The patient was hospitalized earlier this year with a right great toe diabetic foot ulcer with associated osteomyelitis status post right great toe amputation.  The patient also with a right posterior heel ulceration with calcaneal osteomyelitis for which he underwent debridement with bone biopsy 
  [facility]  Progress Note    Prateek Denton II  MRN: [id number]  Date: 4/21/2025  LOS: 11     Reason for F/U :   Right heel/calcaneal ulcer with osteomyelitis    Impression :   Right heel surgical wound dehiscence with associated soft tissue infection and calcaneal osteomyelitis  Status post I&D 4/14/2025  Wound cultures did have E. coli and MRSA isolated  Atrial fibrillation with rapid ventricular response  Acute kidney injury on chronic kidney disease stage IV likely secondary to prerenal disease/dehydration  Started on hemodialysis  Diabetes mellitus type 2 with associated neuropathy  Essential hypertension    Recommendations:   The patient will continue intravenous antimicrobial therapy with ceftriaxone and daptomycin through 5/18/2025  Continue local wound care with the wound VAC and the patient reports that his medications have been delivered to the house and will get the wound VAC delivered  The patient is okay for discharge once arrangements have been made and the patient can follow-up with me in the office in 1 month    Infection Control Recommendations:   Contact precaution    Discharge Planning:   Estimated Length of IV antimicrobials: 5/18/2025  Patient will need a tunneled PICC line Insertion  Patient will need: Home IV  Patient will need outpatient wound care: Yes    Medical Decision making / Summary of Stay:   Prateek Denton II is a 65 y.o.-year-old male who was initially admitted on 4/10/2025.      Prateek has diabetes mellitus type 2 with associated peripheral neuropathy, chronic kidney disease stage IV, single kidney since 2020 [left-sided nephrectomy], hypertension, hyperlipidemia, atrial fibrillation, anemia     The patient was hospitalized earlier this year with a right great toe diabetic foot ulcer with associated osteomyelitis status post right great toe amputation.  The patient also with a right posterior heel ulceration with calcaneal osteomyelitis for which he underwent 
  [facility]  Progress Note    Prateek Denton II  MRN: [id number]  Date: 4/17/2025  LOS: 7     Reason for F/U :   Right heel/calcaneal ulcer with osteomyelitis    Impression :   Right heel surgical wound dehiscence with associated soft tissue infection and calcaneal osteomyelitis  Status post I&D 4/14/2025  Wound cultures did have E. coli and MRSA isolated  Atrial fibrillation with rapid ventricular response  Acute kidney injury on chronic kidney disease stage IV likely secondary to prerenal disease/dehydration  Started on hemodialysis  Diabetes mellitus type 2 with associated neuropathy  Essential hypertension    Recommendations:   The patient will continue intravenous antimicrobial therapy with ceftriaxone and daptomycin  There are no plans for further surgical intervention during this hospital stay  The patient will likely be discharged on intravenous antimicrobial therapy with the above antibiotics    Infection Control Recommendations:   Contact precaution    Discharge Planning:   Estimated Length of IV antimicrobials: To be determined  Patient will need Midline Catheter Insertion/ PICC line Insertion: No  Patient will need: Home IV , Infusion Center,  SNF,  LTAC: Undetermined  Patient willneed outpatient wound care: No    Medical Decision making / Summary of Stay:   Prateek Denton II is a 65 y.o.-year-old male who was initially admitted on 4/10/2025.      Prateek has diabetes mellitus type 2 with associated peripheral neuropathy, chronic kidney disease stage IV, single kidney since 2020 [left-sided nephrectomy], hypertension, hyperlipidemia, atrial fibrillation, anemia     The patient was hospitalized earlier this year with a right great toe diabetic foot ulcer with associated osteomyelitis status post right great toe amputation.  The patient also with a right posterior heel ulceration with calcaneal osteomyelitis for which he underwent debridement with bone biopsy and the patient was 
  [facility]  Progress Note    Prateek Denton II  MRN: [id number]  Date: 4/16/2025  LOS: 6     Reason for F/U :   Right heel/calcaneal ulcer with osteomyelitis    Impression :   Right heel surgical wound dehiscence with associated soft tissue infection and calcaneal osteomyelitis  Status post surgical debridement down to the calcaneal bone 4/14/2025  Atrial fibrillation with rapid ventricular response  Acute kidney injury on chronic kidney disease stage IV likely secondary to prerenal disease/dehydration  Started on hemodialysis  Diabetes mellitus type 2 with associated neuropathy  Essential hypertension    Recommendations:   Patient continues on IV antimicrobial therapy with daptomycin and ceftriaxone  Wound cultures thus far isolated E. coli and MRSA  Tentative plan for the patient to return to the OR for repeat debridement versus delayed clinical primary closure with wound VAC application Thursday or Friday  We will continue to follow clinical progress and adjust therapy accordingly    Infection Control Recommendations:   Contact precautions    Discharge Planning:   Estimated Length of IV antimicrobials: To be determined  Patient will need Midline Catheter Insertion/ PICC line Insertion: No  Patient will need: Home IV , Infusion Center,  SNF,  LTAC: Undetermined  Patient willneed outpatient wound care: No    Medical Decision making / Summary of Stay:   Prateek Denton II is a 65 y.o.-year-old male who was initially admitted on 4/10/2025.      Prateek has diabetes mellitus type 2 with associated peripheral neuropathy, chronic kidney disease stage IV, single kidney since 2020 [left-sided nephrectomy], hypertension, hyperlipidemia, atrial fibrillation, anemia     The patient was hospitalized earlier this year with a right great toe diabetic foot ulcer with associated osteomyelitis status post right great toe amputation.  The patient also with a right posterior heel ulceration with calcaneal 
  [facility]  Progress Note    Prateek Denton II  MRN: [id number]  Date: 4/13/2025  LOS: 3     Reason for Consultation/Summary of Stay:   Right heel wound infection     Assessment :   Right heel surgical wound dehiscence with associated soft tissue infection  Calcaneal osteomyelitis  Acute kidney injury superimposed on chronic kidney disease stage IV, prerenal, requiring acute hemodialysis   Diabetes mellitus, type II with neuropathy  A-fib with RVR  Essential hypertension  Severe penicillin allergy, sulfa allergy    Plan:     Continue IV Cefepime 1G Daily, day 4  Continue IV Daptomycin provided his DEBBI, patient will need to hold pravastatin while on Daptomycin  Continue oral Flagyl 500 mg twice daily-cultures from 3/31 with Bacteroides fragilis that resulted on 4/2/25  We will add probiotic     Liver function normal   Podiatry team following, plan is for surgical debridement when patient medically stable  Follow blood cultures from 4/10, negative to date  4/10 Right foot wound culture with E. coli and Staph areus, sensitivities pending  CRP increased to 37.4, sed rate 24  Continues with diarrhea of liquid stool, 4 times overnight with abdominal pain   UA negative for UTI  Nephrology following and patient underwent hemodialysis treatment the past 2 days   Plan discussed with Dr. Martínez    Infection Control Recommendations:     [place] Precautions  Contact Isolation       Antimicrobial Stewardship Recommendations:   Simplification of therapy  Targeted therapy    Discharge Planning:   Estimated Length of IV antimicrobials: To be determined  Patient will need Midline Catheter Insertion/ PICC line Insertion: No  Patient will need: Home IV , Infusion Center,  SNF,  LTAC: Undetermined  Patient willneed outpatient wound care: No      History of Present Illness:   Prateek Denton II is a 65 y.o.-year-old male who was initially admitted on 4/10/2025.      Prateek has diabetes mellitus type 2 with 
  Progress Note  Foot and Ankle Surgery    CC/Reason for consult:   S/p Right leg I&D (DOS: 04/14/25)    Interval history:  Patient seen and examined  No current pain.  Wound VAC is intact and functioning.    Updated Labs:     WBC:   Lab Results   Component Value Date    WBC 9.1 04/17/2025     ESR:  Lab Results   Component Value Date    SEDRATE 50 (H) 04/17/2025     CRP:  Lab Results   Component Value Date    CRP 9.9 (H) 04/17/2025     BUN/Cr: Downtrending    HPI:   See consultation note for HPI.   ROS: Denies N/V/F/C/SOB/CP.  Otherwise negative except at stated in the HPI in consultation note.   Vitals:  Vitals:    04/17/25 0916   BP:    Pulse:    Resp: 16   Temp:    SpO2:      I/O last 3 completed shifts:  In: 760 [P.O.:760]  Out: 1975 [Urine:1975]  Labs:  Recent Labs     04/16/25 0347 04/17/25 0451   WBC 10.2 9.1   HGB 8.6* 9.4*   HCT 26.0* 29.6*   * 157     --    K 4.4  --    BUN 51*  --    CREATININE 4.4*  --    GLUCOSE 102  --    SEDRATE 34* 50*   CRP 16.4* 9.9*      CBC:  Recent Labs     04/15/25  0332 04/16/25  0347 04/17/25  0451   WBC 14.1* 10.2 9.1   HGB 9.6* 8.6* 9.4*   HCT 28.7* 26.0* 29.6*    135* 157   CRP  --  16.4* 9.9*      BMP:  Recent Labs     04/15/25  0332 04/16/25  0347    137   K 4.5 4.4    105   CO2 23 22   BUN 46* 51*   CREATININE 4.2* 4.4*   GLUCOSE 135* 102   CALCIUM 8.4* 8.2*      Coags:  No results for input(s): \"APTT\", \"INR\" in the last 72 hours.    Invalid input(s): \"PROT\"    Lab Results   Component Value Date    SEDRATE 50 (H) 04/17/2025     Recent Labs     04/16/25  0347 04/17/25  0451   CRP 16.4* 9.9*       PE:   Gen: Alert and oriented, cooperative.   Head: Normocephalic, atraumatic.  Cardiovascular: Tachycardic.  Respiratory: Chest symmetric, no accessory muscle use.     Dressings remained intact due to current wound VAC last PE: 4/15/2025.  LLE:  Neurovascular intact.  Musculoskeletal: EHL/FHL/TA/GS complex motor intact. Non tender to palpation. 
  Progress Note  Foot and Ankle Surgery    CC/Reason for consult:   S/p Right leg I&D (DOS: 04/14/25)    Interval history:  Patient seen and examined  Wound VAC is on and functioning properly with no leaks detected  Plan for wound VAC change tomorrow per wound ostomy  Vitals reviewed, afebrile    Updated Labs:     WBC:   Lab Results   Component Value Date    WBC 9.1 04/20/2025     ESR:  Lab Results   Component Value Date    SEDRATE 41 (H) 04/20/2025     CRP:  Lab Results   Component Value Date    CRP 5.0 04/20/2025     BUN/Cr: Downtrending    HPI:   See consultation note for HPI.   ROS: Denies N/V/F/C/SOB/CP.  Otherwise negative except at stated in the HPI in consultation note.   Vitals:  Vitals:    04/20/25 0754   BP: (!) 149/85   Pulse: 64   Resp: 18   Temp: 97.5 °F (36.4 °C)   SpO2: 100%     I/O last 3 completed shifts:  In: 240 [P.O.:240]  Out: 1200 [Urine:1200]  Labs:  Recent Labs     04/19/25  1214 04/20/25  0519   WBC  --  9.1   HGB  --  9.7*   HCT  --  30.3*   PLT  --  187     --    K 4.7  --    BUN 43*  --    CREATININE 4.4*  --    GLUCOSE 116*  --    SEDRATE  --  41*   CRP  --  5.0      CBC:  Recent Labs     04/18/25  0458 04/19/25  0618 04/20/25  0519   WBC 9.2 9.6 9.1   HGB 9.4* 10.8* 9.7*   HCT 29.0* 33.2* 30.3*    214 187   CRP 6.5* 5.4* 5.0      BMP:  Recent Labs     04/18/25  0458 04/19/25  1214    136   K 5.2 4.7    101   CO2 19* 23   BUN 47* 43*   CREATININE 4.6* 4.4*   GLUCOSE 83 116*   CALCIUM 8.2* 8.6*      Coags:  No results for input(s): \"APTT\", \"INR\" in the last 72 hours.    Invalid input(s): \"PROT\"    Lab Results   Component Value Date    SEDRATE 41 (H) 04/20/2025     Recent Labs     04/18/25  0458 04/19/25  0618 04/20/25  0519   CRP 6.5* 5.4* 5.0       PE:   Gen: Alert and oriented, cooperative.   Head: Normocephalic, atraumatic.  Cardiovascular: Tachycardic.  Respiratory: Chest symmetric, no accessory muscle use.     Dressings remained intact in preparation for 
  Progress Note  Foot and Ankle Surgery    CC/Reason for consult:   S/p Right leg I&D (DOS: 04/14/25)    Interval history:  Patient seen and examined  Wound VAC is on and functioning properly with no leaks detected  Vitals reviewed, afebrile    Updated Labs:     WBC:   Lab Results   Component Value Date    WBC 9.6 04/19/2025     ESR:  Lab Results   Component Value Date    SEDRATE 63 (H) 04/19/2025     CRP:  Lab Results   Component Value Date    CRP 5.4 (H) 04/19/2025     BUN/Cr: Downtrending    HPI:   See consultation note for HPI.   ROS: Denies N/V/F/C/SOB/CP.  Otherwise negative except at stated in the HPI in consultation note.   Vitals:  Vitals:    04/19/25 1121   BP: 125/87   Pulse: 64   Resp: 18   Temp: 98.1 °F (36.7 °C)   SpO2: 98%     I/O last 3 completed shifts:  In: 240 [P.O.:240]  Out: 1125 [Urine:1125]  Labs:  Recent Labs     04/19/25  0618 04/19/25  1214   WBC 9.6  --    HGB 10.8*  --    HCT 33.2*  --      --    NA  --  136   K  --  4.7   BUN  --  43*   CREATININE  --  4.4*   GLUCOSE  --  116*   SEDRATE 63*  --    CRP 5.4*  --       CBC:  Recent Labs     04/17/25  0451 04/18/25  0458 04/19/25  0618   WBC 9.1 9.2 9.6   HGB 9.4* 9.4* 10.8*   HCT 29.6* 29.0* 33.2*    175 214   CRP 9.9* 6.5* 5.4*      BMP:  Recent Labs     04/18/25  0458 04/19/25  1214    136   K 5.2 4.7    101   CO2 19* 23   BUN 47* 43*   CREATININE 4.6* 4.4*   GLUCOSE 83 116*   CALCIUM 8.2* 8.6*      Coags:  No results for input(s): \"APTT\", \"INR\" in the last 72 hours.    Invalid input(s): \"PROT\"    Lab Results   Component Value Date    SEDRATE 63 (H) 04/19/2025     Recent Labs     04/17/25  0451 04/18/25  0458 04/19/25  0618   CRP 9.9* 6.5* 5.4*       PE:   Gen: Alert and oriented, cooperative.   Head: Normocephalic, atraumatic.  Cardiovascular: Tachycardic.  Respiratory: Chest symmetric, no accessory muscle use.     Dressings remained intact in preparation for wound VAC application this afternoon.  Last PE: 
  Progress Note  Foot and Ankle Surgery    CC/Reason for consult: Achilles tendon infection with posterior leg ulceration and soft tissue infection     S/p Right leg I&D (DOS: 4/14/25)    Interval history:  - Patient seen and examined at bedside  - Patient says he is feeling well today  - No acute events overnight  - No consitutional symptoms  - Plan for discharge today  - Pain well controlled    Afebrile, vital signs stable       Updated Labs:     WBC:   Lab Results   Component Value Date    WBC 9.2 04/21/2025     ESR:  Lab Results   Component Value Date    SEDRATE 26 (H) 04/21/2025     CRP:  Lab Results   Component Value Date    CRP 5.1 (H) 04/21/2025     BUN/Cr: Downtrending    HPI:   See consultation note for HPI.   ROS: Denies N/V/F/C/SOB/CP.  Otherwise negative except at stated in the HPI in consultation note.   Vitals:  Vitals:    04/21/25 1643   BP: 132/77   Pulse: 66   Resp: 16   Temp: 98.1 °F (36.7 °C)   SpO2: 98%     I/O last 3 completed shifts:  In: 120 [P.O.:120]  Out: 725 [Urine:725]  Labs:  Recent Labs     04/21/25  0533 04/21/25  0946   WBC 9.2  --    HGB 9.7*  --    HCT 29.8*  --      --    NA  --  139   K  --  5.3   BUN  --  48*   CREATININE  --  4.8*   GLUCOSE  --  107   SEDRATE 26*  --    CRP 5.1*  --       CBC:  Recent Labs     04/19/25  0618 04/20/25  0519 04/21/25  0533   WBC 9.6 9.1 9.2   HGB 10.8* 9.7* 9.7*   HCT 33.2* 30.3* 29.8*    187 188   CRP 5.4* 5.0 5.1*      BMP:  Recent Labs     04/19/25  1214 04/21/25  0946    139   K 4.7 5.3    104   CO2 23 22   BUN 43* 48*   CREATININE 4.4* 4.8*   GLUCOSE 116* 107   CALCIUM 8.6* 9.0      Coags:  No results for input(s): \"APTT\", \"INR\" in the last 72 hours.    Invalid input(s): \"PROT\"    Lab Results   Component Value Date    SEDRATE 26 (H) 04/21/2025     Recent Labs     04/19/25  0618 04/20/25  0519 04/21/25  0533   CRP 5.4* 5.0 5.1*       PE:   Gen: Alert and oriented, cooperative.   Head: Normocephalic, 
  Progress Note  Foot and Ankle Surgery    CC/Reason for consult: Posterior leg ulceration and soft tissue infection, right leg    Due to the complexity of the patient's chief complaint, the need for staged procedures, and their complicated past medical history, an extended hospital stay is anticipated for optimal management and recovery    Interval history:  Patient seen and examined  Consent signed and in chart.  NPO at midnight  Vitals reviewed, afebrile    Updated Labs:     WBC:   Lab Results   Component Value Date    WBC 8.7 04/14/2025     ESR:  Lab Results   Component Value Date    SEDRATE 34 (H) 04/14/2025     CRP:  Lab Results   Component Value Date    CRP 35.7 (H) 04/14/2025     BUN/Cr: 34/3.9    HPI:   See consultation note for HPI.   ROS: Denies N/V/F/C/SOB/CP.  Otherwise negative except at stated in the HPI in consultation note.   Vitals:  Vitals:    04/14/25 0600   BP:    Pulse: 70   Resp: 11   Temp:    SpO2:      I/O last 3 completed shifts:  In: 373.1 [P.O.:240; I.V.:56; IV Piggyback:77.1]  Out: 3340 [Urine:3330; Blood:10]  Labs:  Recent Labs     04/14/25  0302   WBC 8.7   HGB 9.4*   HCT 29.0*   *      K 4.0   BUN 34*   CREATININE 3.9*   GLUCOSE 97   SEDRATE 34*   CRP 35.7*      CBC:  Recent Labs     04/12/25  0608 04/13/25  0610 04/14/25  0302   WBC 7.6 7.8 8.7   HGB 9.4* 9.3* 9.4*   HCT 28.0* 28.0* 29.0*    148 137*   CRP 25.9* 37.4* 35.7*      BMP:  Recent Labs     04/12/25  0608 04/13/25  0610 04/14/25  0302    137 140   K 4.0 4.2 4.0    103 104   CO2 19* 25 22   BUN 67* 36* 34*   CREATININE 4.0* 3.3* 3.9*   GLUCOSE 106 95 97   CALCIUM 7.9* 7.9* 8.1*      Coags:  No results for input(s): \"APTT\", \"INR\" in the last 72 hours.    Invalid input(s): \"PROT\"    Lab Results   Component Value Date    SEDRATE 34 (H) 04/14/2025     Recent Labs     04/12/25  0608 04/13/25  0610 04/14/25  0302   CRP 25.9* 37.4* 35.7*       PE:   Gen: Alert and oriented, cooperative.   Head: 
  Progress Note  Foot and Ankle Surgery    CC/Reason for consult: Posterior leg ulceration and soft tissue infection, right leg    Due to the complexity of the patient's chief complaint, the need for staged procedures, and their complicated past medical history, an extended hospital stay is anticipated for optimal management and recovery    Interval history:  Patient seen and examined  No acute complaints  Discussion about surgical plan on Monday had.  Will review consent tomorrow  BUN and CR coming down closer to baseline    Vitals reviewed, afebrile    Updated Labs:     WBC:   Lab Results   Component Value Date    WBC 7.6 04/12/2025     ESR:  Lab Results   Component Value Date    SEDRATE 33 (H) 04/12/2025     CRP:  Lab Results   Component Value Date    CRP 25.9 (H) 04/12/2025       HPI:   See consultation note for HPI.   ROS: Denies N/V/F/C/SOB/CP.  Otherwise negative except at stated in the HPI in consultation note.   Vitals:  Vitals:    04/12/25 1502   BP: (!) 147/85   Pulse: 73   Resp: 16   Temp: 98.1 °F (36.7 °C)   SpO2:      I/O last 3 completed shifts:  In: 8441.3 [P.O.:420; I.V.:3821.3; IV Piggyback:3600]  Out: 3250 [Urine:2650]  Labs:  Recent Labs     04/10/25  1215 04/10/25  1615 04/12/25  0608   WBC 13.8*   < > 7.6   HGB 14.1   < > 9.4*   HCT 41.8   < > 28.0*      < > 157   INR 1.2  --   --    *   < > 137   K 5.7*   < > 4.0   *   < > 67*   CREATININE 5.1*   < > 4.0*   GLUCOSE 150*   < > 106   SEDRATE 35*   < > 33*   CRP 9.7*   < > 25.9*    < > = values in this interval not displayed.      CBC:  Recent Labs     04/10/25  1215 04/10/25  1919 04/11/25  0550 04/11/25 2004 04/12/25  0608   WBC 13.8*   < > 11.6* 9.6 7.6   HGB 14.1   < > 11.1* 11.6* 9.4*   HCT 41.8   < > 33.9* 35.4* 28.0*      < > 236 199 157   CRP 9.7*  --  19.8*  --  25.9*    < > = values in this interval not displayed.      BMP:  Recent Labs     04/10/25  1615 04/11/25  0550 04/12/25  0608   * 132* 137   K 
 Dialysis Safety Checks:    Patient ID Verified (Y/N) yes     -Hepatitis Test                   Date      Result  Hepatitis B Surface Antigen   2025 neg     Hepatitis B Surface Antibody 2025 neg     Hepatitis B Core Antibody        -Treatment Initiation  Blood Vol Processed Goal (Liters)  Pump Speed x Treatment Hours x 60 Minutes    Target Fluid Removal 0     * Intra-treatment documented Safety Checks include the followin) Access and face visible at all times.     2) All connections and blood lines are secure with no kinks.     3) NVL alarm engaged.     4) Hemosafe device applied (if applicable).     5) No collapse of Arterial or Venous blood chambers.     6) All blood lines / pump segments in the air detectors.  --------------------------------------------------------------------------------    Denies complaints.   
4 Eyes Skin Assessment     NAME:  Prateek Denton II  YOB: 1960  MEDICAL RECORD NUMBER:  [id number]    The patient is being assessed for  Admission    I agree that at least one RN has performed a thorough Head to Toe Skin Assessment on the patient. ALL assessment sites listed below have been assessed.      Areas assessed by both nurses:    Head, Face, Ears, Shoulders, Back, Chest, Arms, Elbows, Hands, Sacrum. Buttock, Coccyx, Ischium, and Legs. Feet and Heels        Does the Patient have a Wound? Yes wound(s) were present on assessment. LDA wound assessment was Initiated and completed by RN       Jeremy Prevention initiated by RN: Yes  Wound Care Orders initiated by RN: Yes    Pressure Injury (Stage 3,4, Unstageable, DTI, NWPT, and Complex wounds) if present, place Wound referral order by RN under : No    New Ostomies, if present place, Ostomy referral order under : No     Patient had a R great toe amputation on 3/31. Podiatry saw patient in ER. Podiatry removed sutures, packed foot, and applied a clean and dry dressing.    Nurse 1 eSignature: Electronically signed by Vanessa Jaeger RN on 4/10/25 at 6:58 PM EDT    **SHARE this note so that the co-signing nurse can place an eSignature**    Nurse 2 eSignature: Electronically signed by Deb Mcfarlane RN on 4/11/25 at 9:48 AM EDT eyes  
ADMISSION NOTE         Patient admitted to room: 2035     Time of admit: 1806     Admit from: ER      Reason for admission: Aflutter RVR         Family at bedside: Yes. Wife, Valencia     Patient is currently: resting in bed comfortably, vitals obtained, telemetry placed on pt. No distress noted. Patient has been oriented to room, educated on how to use call light, and to call for assistance prior to getting up. Bed in lowest and locked position. 2 siderails up for safety. Call light within reach.        
[facility]  Office: [phone]  Aung Luis, DO, Mansoor Cline, DO, Josse Han DO, Gregory Archibald, DO, Andrew Roberts MD, Mayra Medel MD, Ray Johnson MD, Romy Amador MD,  Mic Meraz MD, Rom Hayes MD, Casandra Washington MD,  Ely Burks DO, Dorie Anderson MD, Marcos Travis MD, Trey Luis DO, Delia Clemens MD,  Jorge Major DO, Rose Yi MD, Celia Guillen MD, Matthew Hyatt MD,  Sammy Galvez MD, Mary Kay Karimi MD, Edson Hay MD, Mateo Turner MD, Stan Cline MD, Radha Hoskins MD, Mikael Cm DO, Virgie Josue MD, Eran Mckenzie MD, Ely Blackman MD, Mohsin Reza, MD, George Castaneda MD, Shirley Waterhouse, CNP,  Missy Chowdhury, CNP, Mikael Cai, CNP,  Shantelle Monson, KIMI, Betty Boles, CNP, Yany Garcia, CNP, Cathie Law, CNP, Lynda Russell, CNP, An King, PA-C, Gabby Vaughn, CNP, Saray Patton, CNP,  Lacey Wilkinson, CNP, Nidhi Sinha, CNP, Hank Gilbert, PA-C, Jaclyn Neff, CNP,  Patsy Peterson, CNS, Zulay Ramsay, CNP, Martha Prince, CNP,   Mariela Benedict, CNP         [facility]   IN-PATIENT SERVICE   [facility]    Progress Note    4/12/2025    7:14 AM    Name:   Prateek Denton II  MRN:     [id number]     Acct:      [id number]   Room:   2035/2035-01  IP Day:  2  Admit Date:  4/10/2025 11:23 AM    PCP:   Lisseth Coello APRN - CNP  Code Status:  Full Code    Subjective:     C/C:   Chief Complaint   Patient presents with    Nausea    Vomiting    Abdominal Pain     Since April 2 or 3    Atrial Flutter     New for him      Chest Pain     Left side    Dizziness    Shortness of Breath     Interval History Status: Improved    Patient well feels better today, no further chest pain.  Nausea vomiting working improved, tolerating diet.  Denies chest pain, shortness of breath, fevers chills, dizziness or other acute complaints.    Brief History:     Per H&P  \"This 65-year-old male is admitted for management 
[facility]  Office: [phone]  Aung Luis DO, Mansoor Cline, DO, Josse Han DO, Gregory Archibald, DO, Andrew Roberts MD, Mayra Medel MD, Ray Johnson MD, Romy Amador MD,  Mic Meraz MD, Rom Hayes MD, Casandra Washington MD,  Ely Burks DO, Dorie Anderson MD, Marcos Travis MD, Trey Luis DO, Delia Clemens MD,  Jorge Major DO, Rose Yi MD, Celia Guillen MD, Matthew Hyatt MD,  Sammy Galvez MD, Mary Kay Karimi MD, Edson Hay MD, Mateo Turner MD, Stan Cline MD, Radha Hoskins MD, Mikael Cm DO, Virgie Josue MD, Eran Mckenzie MD, Ely Blackman MD, Mohsin Reza, MD, George Castaneda MD, Shirley Waterhouse, CNP,  Missy Chowdhury, CNP, Mikael Cai, CNP,  Shantelle Monson, KIMI, Betty Boles, CNP, Yany Garcia, CNP, Cathie Law, CNP, Lynda Russell, CNP, An King, PA-C, Gabby Vaughn, CNP, Saray Patton, CNP,  Lacey Wilkinson, CNP, Nidhi Sinha, CNP, Hank Gilbert, PA-C, Jaclyn Neff, CNP,  Patsy Peterson, CNS, Zulay Ramsay, CNP, Martha Prince, CNP,   Mariela Benedict, CNP         [facility]   IN-PATIENT SERVICE   [facility]    Progress Note    4/17/2025    10:25 AM    Name:   Prateek Denton II  MRN:     [id number]     Acct:      [id number]   Room:   1021/1021-01  IP Day:  7  Admit Date:  4/10/2025 11:23 AM    PCP:   Lisseth Coello APRN - CNP  Code Status:  Full Code    Subjective:     C/C:   Chief Complaint   Patient presents with    Nausea    Vomiting    Abdominal Pain     Since April 2 or 3    Atrial Flutter     New for him      Chest Pain     Left side    Dizziness    Shortness of Breath     Interval History Status: Improved    Patient is resting in comfortably in bed, denies any complaints of chest pain, shortness of breath, nausea or vomiting, fevers or chills.    Brief History:     Per H&P  \"This 65-year-old male is admitted for management of multiple medical issues including atrial flutter with 
All patient belongings collected.  IV and telemetry removed.   Discharge paperwork given and explained to patient regarding new scripts and follow up appointment information.  Patient sent with extra wound care supplies, educated on tunneled PICC line care, and antibiotic infusion information.  Patient verbalized understanding. Patient wheeled out to private auto via staff.  See discharge event for further details.       
Cardiovascular progress Note          Patient name: Prateek Denton II    YOB: 1960  Date of admission:  4/10/2025       Patient seen, examined. Previous clinical entries reviewed.  All available laboratory, imaging and ancillary data reviewed.    Subjective:      Did well overnight.  Remains in sinus rhythm with a controlled heart rate.    No new shortness of breath at rest.  Angina or palpitations.    Systems review:  Constitutional: No fever/chills.   HENT: No headache, neck pain or neck stiffness. No sore throat or dysphagia.   Gastrointestinal: No abdominal pain, nausea or vomiting.   Cardiac: As Above  Respiratory: As above  Neurologic: No new focal weakness or numbness  Psychiatric: Normal mood and mentation       Examination:   Vitals: /71   Pulse 66   Temp 98.4 °F (36.9 °C) (Temporal)   Resp 14   Ht 1.803 m (5' 11\")   Wt 117.5 kg (259 lb)   SpO2 96%   BMI 36.12 kg/m²     Intake/Output Summary (Last 24 hours) at 4/13/2025 1459  Last data filed at 4/13/2025 1200  Gross per 24 hour   Intake 77.14 ml   Output 2450 ml   Net -2372.86 ml       General appearance: Comfortable in no apparent distress.  HEENT: No pallor. No icterus  Neck: Supple.  Lungs:Generally decreased breath sounds  Heart: S1,S2  Abdomen: Soft  Extremities: No peripheral edema  Skin: No obvious rashes.  Musculoskeletal: No obvious deformities.  Neurologic: No focal deficits.     Labs/ Ancillary data:     CBC:   Recent Labs     04/11/25  2004 04/12/25  0608 04/13/25  0610   WBC 9.6 7.6 7.8   HGB 11.6* 9.4* 9.3*    157 148     BMP:    Recent Labs     04/11/25  0550 04/12/25  0608 04/13/25  0610   * 137 137   K 5.2 4.0 4.2    107 103   CO2 12* 19* 25   BUN 99* 67* 36*   CREATININE 4.6* 4.0* 3.3*   GLUCOSE 135* 106 95     Hepatic:   Recent Labs     04/12/25  1702   AST 21   ALT 20   BILITOT 0.2   ALKPHOS 69     INR:   No results for input(s): \"INR\" in the last 72 hours.        Medications:   Scheduled 
Cardiovascular progress Note          Patient name: Prateek Denton II    YOB: 1960  Date of admission:  4/10/2025       Patient seen, examined. Previous clinical entries reviewed.  All available laboratory, imaging and ancillary data reviewed.    Subjective:      He is getting dialysis  Remains in sinus rhythm with a controlled heart rate.    No new shortness of breath at rest.  No angina.  No palpitations.    Systems review:  Constitutional: No fever/chills.   HENT: No headache, neck pain or neck stiffness. No sore throat or dysphagia.   Gastrointestinal: No abdominal pain, nausea or vomiting.   Cardiac: As Above  Respiratory: As above  Neurologic: No new focal weakness or numbness  Psychiatric: Normal mood and mentation       Examination:   Vitals: BP (!) 142/87   Pulse 83   Temp 98.1 °F (36.7 °C) (Temporal)   Resp 12   Ht 1.803 m (5' 11\")   Wt 119.3 kg (263 lb 1.6 oz)   SpO2 94%   BMI 36.70 kg/m²     Intake/Output Summary (Last 24 hours) at 4/12/2025 1302  Last data filed at 4/12/2025 0942  Gross per 24 hour   Intake 3497.38 ml   Output 2490 ml   Net 1007.38 ml       General appearance: Comfortable in no apparent distress.  HEENT: No pallor. No icterus  Neck: Supple.  Lungs:Generally decreased breath sounds  Heart: S1,S2  Abdomen: Soft  Extremities: No peripheral edema  Skin: No obvious rashes.  Musculoskeletal: No obvious deformities.  Neurologic: No focal deficits.     Labs/ Ancillary data:     CBC:   Recent Labs     04/11/25  0550 04/11/25 2004 04/12/25  0608   WBC 11.6* 9.6 7.6   HGB 11.1* 11.6* 9.4*    199 157     BMP:    Recent Labs     04/10/25  1615 04/11/25  0550 04/12/25  0608   * 132* 137   K 4.8 5.2 4.0    105 107   CO2 12* 12* 19*   * 99* 67*   CREATININE 4.8* 4.6* 4.0*   GLUCOSE 157* 135* 106     Hepatic:   Recent Labs     04/10/25  1215   AST 25   ALT 27   BILITOT 0.3   ALKPHOS 91     INR:   Recent Labs     04/10/25  1215   INR 1.2 
Cardiovascular progress Note          Patient name: Prateek Denton II    YOB: 1960  Date of admission:  4/10/2025       Patient seen, examined. Previous clinical entries reviewed.  All available laboratory, imaging and ancillary data reviewed.    Subjective:      Patient feeling slightly better.  He is back in sinus rhythm.  Minimal abdominal pain.  No new shortness of breath at rest.  No angina.  No palpitations.    Systems review:  Constitutional: No fever/chills.   HENT: No headache, neck pain or neck stiffness. No sore throat or dysphagia.   Gastrointestinal: No abdominal pain, nausea or vomiting.   Cardiac: As Above  Respiratory: As above  Neurologic: No new focal weakness or numbness  Psychiatric: Normal mood and mentation       Examination:   Vitals: /88   Pulse 67   Temp 97.9 °F (36.6 °C) (Axillary)   Resp 15   Ht 1.803 m (5' 11\")   Wt 117.9 kg (260 lb)   SpO2 100%   BMI 36.26 kg/m²     Intake/Output Summary (Last 24 hours) at 4/11/2025 1426  Last data filed at 4/11/2025 1026  Gross per 24 hour   Intake 5559.9 ml   Output 1250 ml   Net 4309.9 ml       General appearance: Comfortable in no apparent distress.  HEENT: No pallor. No icterus  Neck: Supple.  Lungs:Generally decreased breath sounds  Heart: S1,S2  Abdomen: Soft  Extremities: No peripheral edema  Skin: No obvious rashes.  Musculoskeletal: No obvious deformities.  Neurologic: No focal deficits.     Labs/ Ancillary data:     CBC:   Recent Labs     04/10/25  1215 04/10/25  1919 04/11/25  0550   WBC 13.8* 17.2* 11.6*   HGB 14.1 12.3* 11.1*    277 236     BMP:    Recent Labs     04/10/25  1215 04/10/25  1615 04/11/25  0550   * 131* 132*   K 5.7* 4.8 5.2   CL 98 103 105   CO2 12* 12* 12*   * 100* 99*   CREATININE 5.1* 4.8* 4.6*   GLUCOSE 150* 157* 135*     Hepatic:   Recent Labs     04/10/25  1215   AST 25   ALT 27   BILITOT 0.3   ALKPHOS 91     INR:   Recent Labs     04/10/25  1215   INR 1.2 
Cardiovascular progress Note          Patient name: Prateek Denton II    YOB: 1960  Date of admission:  4/10/2025       Patient seen, examined. Previous clinical entries reviewed.  All available laboratory, imaging and ancillary data reviewed.    Subjective:     Resting comfortably without any new complaints.  Remains in sinus rhythm without any significant arrhythmias    Systems review:  Constitutional: No fever/chills.   HENT: No headache, neck pain or neck stiffness. No sore throat or dysphagia.   Gastrointestinal: No abdominal pain, nausea or vomiting.   Cardiac: As Above  Respiratory: As above  Neurologic: No new focal weakness or numbness  Psychiatric: Normal mood and mentation       Examination:   Vitals: BP (!) 149/77   Pulse 76   Temp 98.8 °F (37.1 °C) (Temporal)   Resp 16   Ht 1.803 m (5' 11\")   Wt 118.3 kg (260 lb 14.4 oz)   SpO2 95%   BMI 36.39 kg/m²     Intake/Output Summary (Last 24 hours) at 4/15/2025 1400  Last data filed at 4/15/2025 1200  Gross per 24 hour   Intake 457.37 ml   Output 1500 ml   Net -1042.63 ml       General appearance: Comfortable in no apparent distress.  HEENT: No pallor. No icterus  Neck: Supple.  Lungs:Generally decreased breath sounds  Heart: S1,S2  Abdomen: Soft  Extremities: No peripheral edema  Skin: No obvious rashes.  Musculoskeletal: No obvious deformities.  Neurologic: No focal deficits.     Labs/ Ancillary data:     CBC:   Recent Labs     04/13/25  0610 04/14/25  0302 04/15/25  0332   WBC 7.8 8.7 14.1*   HGB 9.3* 9.4* 9.6*    137* 148     BMP:    Recent Labs     04/13/25  0610 04/14/25  0302 04/15/25  0332    140 136   K 4.2 4.0 4.5    104 102   CO2 25 22 23   BUN 36* 34* 46*   CREATININE 3.3* 3.9* 4.2*   GLUCOSE 95 97 135*     Hepatic:   Recent Labs     04/12/25  1702   AST 21   ALT 20   BILITOT 0.2   ALKPHOS 69     INR:   No results for input(s): \"INR\" in the last 72 hours.        Medications:   Scheduled Meds:   [id number] 
Cardiovascular progress Note          Patient name: Prateek Denton II    YOB: 1960  Date of admission:  4/10/2025       Patient seen, examined. Previous clinical entries reviewed.  All available laboratory, imaging and ancillary data reviewed.    Subjective:     Resting comfortably without any new complaints.  Remains in sinus rhythm without any significant arrhythmias  Blood pressures running a little high.  He was started on amlodipine 5 mg daily.  Additional IV labetalol given.    Systems review:  Constitutional: No fever/chills.   HENT: No headache, neck pain or neck stiffness. No sore throat or dysphagia.   Gastrointestinal: No abdominal pain, nausea or vomiting.   Cardiac: As Above  Respiratory: As above  Neurologic: No new focal weakness or numbness  Psychiatric: Normal mood and mentation       Examination:   Vitals: BP (!) 154/83   Pulse 59   Temp 98.1 °F (36.7 °C)   Resp 18   Ht 1.803 m (5' 11\")   Wt 117.9 kg (260 lb)   SpO2 97%   BMI 36.26 kg/m²     Intake/Output Summary (Last 24 hours) at 4/16/2025 1652  Last data filed at 4/16/2025 1041  Gross per 24 hour   Intake 760 ml   Output 1500 ml   Net -740 ml       General appearance: Comfortable in no apparent distress.  HEENT: No pallor. No icterus  Neck: Supple.  Lungs:Generally decreased breath sounds  Heart: S1,S2  Abdomen: Soft  Extremities: No peripheral edema  Skin: No obvious rashes.  Musculoskeletal: No obvious deformities.  Neurologic: No focal deficits.     Labs/ Ancillary data:     CBC:   Recent Labs     04/14/25  0302 04/15/25  0332 04/16/25  0347   WBC 8.7 14.1* 10.2   HGB 9.4* 9.6* 8.6*   * 148 135*     BMP:    Recent Labs     04/14/25  0302 04/15/25  0332 04/16/25  0347    136 137   K 4.0 4.5 4.4    102 105   CO2 22 23 22   BUN 34* 46* 51*   CREATININE 3.9* 4.2* 4.4*   GLUCOSE 97 135* 102           Medications:   Scheduled Meds:   amLODIPine  5 mg Oral Daily    cefTRIAXone (ROCEPHIN) IV  2,000 mg 
Cardiovascular progress Note          Patient name: Prateek Denton II    YOB: 1960  Date of admission:  4/10/2025       Patient seen, examined. Previous clinical entries reviewed.  All available laboratory, imaging and ancillary data reviewed.    Subjective:   No new events.  Resting comfortably without any new complaints.    Systems review:  Constitutional: No fever/chills.   HENT: No headache, neck pain or neck stiffness. No sore throat or dysphagia.   Gastrointestinal: No abdominal pain, nausea or vomiting.   Cardiac: As Above  Respiratory: As above  Neurologic: No new focal weakness or numbness  Psychiatric: Normal mood and mentation       Examination:   Vitals: BP (!) 153/76   Pulse 78   Temp 98.3 °F (36.8 °C) (Temporal)   Resp 11   Ht 1.803 m (5' 11\")   Wt 116.6 kg (257 lb)   SpO2 95%   BMI 35.84 kg/m²     Intake/Output Summary (Last 24 hours) at 4/14/2025 1453  Last data filed at 4/14/2025 0845  Gross per 24 hour   Intake 100 ml   Output 1125 ml   Net -1025 ml       General appearance: Comfortable in no apparent distress.  HEENT: No pallor. No icterus  Neck: Supple.  Lungs:Generally decreased breath sounds  Heart: S1,S2  Abdomen: Soft  Extremities: No peripheral edema  Skin: No obvious rashes.  Musculoskeletal: No obvious deformities.  Neurologic: No focal deficits.     Labs/ Ancillary data:     CBC:   Recent Labs     04/12/25  0608 04/13/25  0610 04/14/25  0302   WBC 7.6 7.8 8.7   HGB 9.4* 9.3* 9.4*    148 137*     BMP:    Recent Labs     04/12/25  0608 04/13/25  0610 04/14/25  0302    137 140   K 4.0 4.2 4.0    103 104   CO2 19* 25 22   BUN 67* 36* 34*   CREATININE 4.0* 3.3* 3.9*   GLUCOSE 106 95 97     Hepatic:   Recent Labs     04/12/25  1702   AST 21   ALT 20   BILITOT 0.2   ALKPHOS 69     INR:   No results for input(s): \"INR\" in the last 72 hours.        Medications:   Scheduled Meds:   cefepime  1,000 mg IntraVENous Q12H    lactobacillus  1 capsule Oral Daily 
Comprehensive Nutrition Assessment    Type and Reason for Visit:  Reassess    Nutrition Recommendations/Plan:   ADULT DIET; Regular; 4 carb choices (60 gm/meal)  Glucerna 3x/day  Monitor p.o intakes and labs     Malnutrition Assessment:  Malnutrition Status:  No malnutrition (04/17/25 1319)        Nutrition Assessment:    Patient reports a good appeitte and is eating well at meals and has been consuming Glucerna supplements. Patient states had home he takes Premire protein shakes. Patient is status post debridement of right ankle with wound vac applied (4/14). Patient is close to discharge with wound vac. Continue current diet and ONS. Monitor p.o intakes and labs.    Nutrition Related Findings:    Edema: +1 BLE. Active bowel sounds. Diarrhea. Wound Vac Wound Type: Wound Vac, Open Wounds       Current Nutrition Intake & Therapies:    Average Meal Intake: %  Average Supplements Intake: %  ADULT DIET; Regular; 4 carb choices (60 gm/meal)  ADULT ORAL NUTRITION SUPPLEMENT; Breakfast, Lunch, Dinner; Diabetic Oral Supplement    Anthropometric Measures:  Height: 180.3 cm (5' 10.98\")  Ideal Body Weight (IBW): 172 lbs (78 kg)    Admission Body Weight: 120.7 kg (266 lb 1.5 oz)  Current Body Weight: 117.9 kg (259 lb 14.8 oz), 151.1 % IBW. Weight Source: Bed scale  Current BMI (kg/m2): 36.3           Weight Adjustment For: No Adjustment                 BMI Categories: Obese Class 2 (BMI 35.0 -39.9)    Estimated Daily Nutrient Needs:  Energy Requirements Based On: Kcal/kg  Weight Used for Energy Requirements: Current  Energy (kcal/day): 2000-2240kcals (17-19kcals/kg)  Weight Used for Protein Requirements: Current  Protein (g/day): 130-140g (1.1-1.2g/kg)  Method Used for Fluid Requirements: 1 ml/kcal  Fluid (ml/day): 2000-2240ml    Nutrition Diagnosis:   Increased nutrient needs related to acute injury/trauma as evidenced by wounds    Nutrition Interventions:   Food and/or Nutrient Delivery: Continue Current Diet, 
Comprehensive Nutrition Assessment    Type and Reason for Visit:  VIOLETA    Nutrition Recommendations/Plan:   Continue current diet.   Monitor po intake, wound, and diarrhea.      Malnutrition Assessment:  Malnutrition Status:  No malnutrition (04/17/25 1319)        Nutrition Assessment:    Pt seen in ER for ongoing n/v for several days, weakness, lightheadedness, severe hunger pains, and left side episodic chest pains. PMH significant for DM2, CKD, large bowel obstruction, GI bleed, HTN, and HLD. On 3/31 pt had surgery on his right heel and had his right great toe amputated. Per internal medicine H&P note pt oral intake had been poor for 7-8 days prior to this admission. Upon admission pt was found to be dehydrated. Additionally, surgical wound dehisence and calcaneal osteomyelitis was noted. On 4/14 pt underwent wound debridement and wound vac was applied. During his post-op assessment with IM on 4/16 pt reported having diarrhea. Per IM note pt stated he had diarrhea prior to admisson but has since gotten worse. Pt is receiving culturelle for symptoms. Pt states his appetite has imporved. Pt ordered fruit tray for lunch, states it is one of his preferred meal choices. He denies any nutrition concerns at this time. He reports he is still having diarrhea but symptoms have improved some. Continue current diet. Monitor po intake, wound, and diarrhea.    Nutrition Related Findings:    Meds: humalog, culturelle. Active bowel sound, diarrhea. BLE +1 edema Wound Type: Open Wounds, Surgical Incision       Current Nutrition Intake & Therapies:    Average Meal Intake: 26-50%     ADULT DIET; Regular; 4 carb choices (60 gm/meal)  ADULT ORAL NUTRITION SUPPLEMENT; Breakfast, Lunch, Dinner; Diabetic Oral Supplement    Anthropometric Measures:  Height: 180.3 cm (5' 10.98\")  Ideal Body Weight (IBW): 172 lbs (78 kg)    Admission Body Weight: 120.7 kg (266 lb 1.5 oz)  Current Body Weight: 117.9 kg (259 lb 14.8 oz), 151.1 % IBW. Weight 
DATE: 2025    NAME: Prateek Denton II  MRN: [id number]   : 1960    Patient not seen this date for Physical Therapy due to:      [] Cancel by RN or physician due to:    [x] Hemodialysis    [] Critical Lab Value Level     [] Blood transfusion in progress    [] Acute or unstable cardiovascular status   _MAP < 55 or more than >115  _HR < 40 or > 130    [] Acute or unstable pulmonary status   -FiO2 > 60%   _RR < 5 or >40    _O2 sats < 85%    [] Strict Bedrest    [] Off Unit for surgery or procedure    [] Off Unit for testing       [] Pending imaging to R/O fracture    [] Refusal by Patient      [] Other      [] PT being discontinued at this time. Patient independent. No further needs.     [] PT being discontinued at this time as the patient has been transferred to hospice care. No further needs.      Nani Worley, PTA     
Dialysis Post Treatment Report:     -Access Assessment  WNL     -Ultrafiltration   UF Goal 900   Prime (-) 250   NS Flush (-) 650   Other (-/+)    Total UF Removed 0     -Medications / Blood Administration  Medications Given (Y/N) yes   Blood Products (Y/N) no     Narrative:  Tolerated well. Attempted to clot approx 2 hours into tx. NS flush with large clot arterial chamber. Dialyzer moderately streaked. Evidence of clotting resuming just before treatment ended. Blood safely returned. Denies complaints.   
End Of Shift Note  [place] [facility]  Summary of shift: Patient participated with therapy today. Heart rate remains NSR. Bicarb gtt to stop once bag is finished per nephrology. Plan for I&D tomorrow with podiatry.    Vitals:    Vitals:    04/13/25 0900 04/13/25 0915 04/13/25 1200 04/13/25 1600   BP:   122/71 (!) 142/83   Pulse: 67 69 66 75   Resp: 11 11 14 14   Temp:   98.4 °F (36.9 °C) 97.2 °F (36.2 °C)   TempSrc:   Temporal Temporal   SpO2: 98% 97% 96% 96%   Weight:       Height:            I&O:   Intake/Output Summary (Last 24 hours) at 4/13/2025 1823  Last data filed at 4/13/2025 1700  Gross per 24 hour   Intake --   Output 2050 ml   Net -2050 ml       Resp Status: Patient remains on RA with oxygen saturation greater than 92%.    Ventilator Settings:     / / /     Critical Care IV infusions:   Amiodarone Stopped (04/12/25 0001)    sodium chloride      sodium chloride      dextrose      sodium bicarbonate 75 mEq in sodium chloride 0.45 % 1,000 mL infusion 75 mL/hr at 04/13/25 0826        LDA:   Peripheral IV 04/10/25 Right;Anterior Cephalic (Active)   Number of days: 3       Peripheral IV 04/10/25 Distal;Left Antecubital (Active)   Number of days: 3       Peripheral IV 04/11/25 Right;Anterior Forearm (Active)   Number of days: 1       Peripheral IV 04/11/25 Proximal;Right;Anterior Cephalic (Active)   Number of days: 1       Urinary Catheter 04/10/25 Bryant (Active)   Number of days: 3       Hemodialysis Central Access - Temporary Right  (Active)   Number of days: 2       Wound 01/21/25 Toe (Comment  which one) Right scab (Active)   Number of days: 81       Wound 01/21/25 Heel Right quarter size open dry wound (Active)   Number of days: 81       Incision 01/30/25 Toe (Comment  which one) Right (Active)   Number of days: 73       Incision 03/31/25 Heel Right;Posterior (Active)   Number of days: 13        
End Of Shift Note  [facility]  Summary of shift: NO issues overnight. CHG bath given prior to surgery this am I&D of right heel wound.    Vitals:    Vitals:    04/14/25 0200 04/14/25 0300 04/14/25 0400 04/14/25 0500   BP:       Pulse: 78 74 69 69   Resp: 19 12 18 (!) 9   Temp:   98.3 °F (36.8 °C)    TempSrc:       SpO2: 96%      Weight:   116.6 kg (257 lb)    Height:            I&O:   Intake/Output Summary (Last 24 hours) at 4/14/2025 0552  Last data filed at 4/14/2025 0345  Gross per 24 hour   Intake --   Output 2250 ml   Net -2250 ml       Resp Status:  Room air    Ventilator Settings:     / / /     Critical Care IV infusions:   Amiodarone Stopped (04/12/25 0001)    sodium chloride      sodium chloride      dextrose          LDA:   Peripheral IV 04/10/25 Right;Anterior Cephalic (Active)   Number of days: 3       Peripheral IV 04/10/25 Distal;Left Antecubital (Active)   Number of days: 3       Peripheral IV 04/11/25 Right;Anterior Forearm (Active)   Number of days: 2       Peripheral IV 04/11/25 Proximal;Right;Anterior Cephalic (Active)   Number of days: 2       Urinary Catheter 04/10/25 Bryant (Active)   Number of days: 3       Hemodialysis Central Access - Temporary Right  (Active)   Number of days: 2       Wound 01/21/25 Toe (Comment  which one) Right scab (Active)   Number of days: 82       Wound 01/21/25 Heel Right quarter size open dry wound (Active)   Number of days: 82       Incision 01/30/25 Toe (Comment  which one) Right (Active)   Number of days: 74       Incision 03/31/25 Heel Right;Posterior (Active)   Number of days: 13         
End Of Shift Note  [facility]  Summary of shift: Patient had an uneventful night. Blood pressures increased to the 170's/ 80's. Writer contacted Dr. Cagle for interventions (see note). Q6 labetalol 20mg IV added PRN for sys BP >160. Patient tolerated and improved with the labetalol. No further concerns at this time. Plan for today is wound vac and decision for nephrology management.   Vitals:    Vitals:    04/16/25 0200 04/16/25 0400 04/16/25 0600 04/16/25 0603   BP: 124/70 (!) 157/86 (!) 167/93 (!) 161/87   Pulse: 59 61 61 65   Resp:  16     Temp:  97.7 °F (36.5 °C)     TempSrc:  Temporal     SpO2: 97% 100% 93% 92%   Weight:       Height:            I&O:   Intake/Output Summary (Last 24 hours) at 4/16/2025 0616  Last data filed at 4/16/2025 0408  Gross per 24 hour   Intake 457.37 ml   Output 1600 ml   Net -1142.63 ml       Resp Status: RA during the day CPAP at night    Ventilator Settings:     / / /     Critical Care IV infusions:   Amiodarone Stopped (04/12/25 0001)    sodium chloride      sodium chloride      dextrose          LDA:   Peripheral IV 04/10/25 Right;Anterior Cephalic (Active)   Number of days: 5       Peripheral IV 04/11/25 Proximal;Right;Anterior Cephalic (Active)   Number of days: 4       Hemodialysis Central Access - Temporary Right  (Active)   Number of days: 4       Wound 01/21/25 Toe (Comment  which one) Right scab (Active)   Number of days: 84       Wound 01/21/25 Heel Right quarter size open dry wound (Active)   Number of days: 84       Incision 01/30/25 Toe (Comment  which one) Right (Active)   Number of days: 76       Incision 03/31/25 Heel Right;Posterior (Active)   Number of days: 15       Incision 04/14/25 Ankle Posterior;Right (Active)   Number of days: 2         
End Of Shift Note  [facility]  Summary of shift: Pt tolerate HD. Bicarb adjusted to 75ml/hr. L foot is wrapped with ace wrap. No drainage and mild odor noted. Good output.     Vitals:    Vitals:    04/12/25 1330 04/12/25 1400 04/12/25 1430 04/12/25 1502   BP: 132/77 (!) 141/85 139/87 (!) 147/85   Pulse: 70 72 73 73   Resp:    16   Temp:    98.1 °F (36.7 °C)   TempSrc:       SpO2:       Weight:    119.3 kg (263 lb 0.1 oz)   Height:            I&O:   Intake/Output Summary (Last 24 hours) at 4/12/2025 1842  Last data filed at 4/12/2025 1822  Gross per 24 hour   Intake 2767.13 ml   Output 2690 ml   Net 77.13 ml       Resp Status: Room air    Ventilator Settings:     / / /     Critical Care IV infusions:   Amiodarone Stopped (04/12/25 0001)    sodium chloride      sodium chloride      dextrose      sodium bicarbonate 75 mEq in sodium chloride 0.45 % 1,000 mL infusion 75 mL/hr at 04/12/25 1821        LDA:   Peripheral IV 04/10/25 Right;Anterior Cephalic (Active)   Number of days: 2       Peripheral IV 04/10/25 Distal;Left Antecubital (Active)   Number of days: 2       Peripheral IV 04/11/25 Right;Anterior Forearm (Active)   Number of days: 0       Peripheral IV 04/11/25 Proximal;Right;Anterior Cephalic (Active)   Number of days: 0       Urinary Catheter 04/10/25 Bryant (Active)   Number of days: 2       Hemodialysis Central Access - Temporary Right  (Active)   Number of days: 1       Wound 01/21/25 Toe (Comment  which one) Right scab (Active)   Number of days: 80       Wound 01/21/25 Heel Right quarter size open dry wound (Active)   Number of days: 80       Incision 01/30/25 Toe (Comment  which one) Right (Active)   Number of days: 72       Incision 03/31/25 Heel Right;Posterior (Active)   Number of days: 12         
End Of Shift Note  [facility]  Summary of shift: Slept last part of the night. Started shift with Aflutter in the 150's. Amio bolus and drip started. Remains in Aflutter in the 60's. Recent R great toe amputation on 3/31/25. ACE wrap of RLE, incision site with drainage. C/o pain at surgical site but denies need for pain med. Rigors at start of shift subsided throughout the night after heat in room increased, warm blanket, & door shut. Zofran for intractable nausea. Bicarb, heparin, & amio drips infusing. Bryant with good output. Has CKD3 with hx: L nephrectomy. Has been afebrile. Lois Pérez RN      Vitals:    Vitals:    04/11/25 0200 04/11/25 0300 04/11/25 0400 04/11/25 0500   BP: 107/78 110/77 119/83 120/82   Pulse: 71 70 74 71   Resp: 15 15 15 13   Temp:   97.3 °F (36.3 °C)    TempSrc:   Temporal    SpO2: 98% 99% 98%    Weight:   117.9 kg (260 lb)    Height:            I&O:   Intake/Output Summary (Last 24 hours) at 4/11/2025 0550  Last data filed at 4/11/2025 0500  Gross per 24 hour   Intake 5559.9 ml   Output 850 ml   Net 4709.9 ml       Resp Status: 2 L (does not have at home)    Ventilator Settings:     / / /     Critical Care IV infusions:   sodium chloride      heparin (PORCINE) Infusion 10 Units/kg/hr (04/11/25 0500)    sodium chloride      sodium chloride Stopped (04/11/25 0149)    dextrose      sodium bicarbonate 75 mEq in sodium chloride 0.45 % 1,000 mL infusion 125 mL/hr at 04/11/25 0154    Amiodarone 1 mg/min (04/11/25 0500)        LDA:   Peripheral IV 04/10/25 Right;Anterior Cephalic (Active)   Number of days: 0       Peripheral IV 04/10/25 Distal;Left Antecubital (Active)   Number of days: 0       Urinary Catheter 04/10/25 Bryant (Active)   Number of days: 0       Wound 01/21/25 Toe (Comment  which one) Right scab (Active)   Number of days: 79       Wound 01/21/25 Heel Right quarter size open dry wound (Active)   Number of days: 79       Incision 01/30/25 Toe (Comment  which one) Right 
End Of Shift Note  [facility]  Summary of shift: patient did well this shift. Wound care completed per podiatry. They asked for wound VAC to be in room for tomorrow. Placed in room and ready. Wound care nurse messaged via perfect serve to update her that they have asked for her help with application. Patient to decide on continuing with HD or following up outpatient with close monitoring.     Vitals:    Vitals:    04/15/25 1100 04/15/25 1200 04/15/25 1539 04/15/25 1600   BP:  (!) 149/77 (!) 167/86    Pulse:  76 68 66   Resp:  16 18 13   Temp:  98.8 °F (37.1 °C) 98 °F (36.7 °C)    TempSrc:  Temporal Oral    SpO2: 97% 95% 94% 94%   Weight:       Height:            I&O:   Intake/Output Summary (Last 24 hours) at 4/15/2025 1936  Last data filed at 4/15/2025 1200  Gross per 24 hour   Intake 457.37 ml   Output 800 ml   Net -342.63 ml       Resp Status: room air    Ventilator Settings:     / / /     Critical Care IV infusions:   Amiodarone Stopped (04/12/25 0001)    sodium chloride      sodium chloride      dextrose          LDA:   Peripheral IV 04/10/25 Right;Anterior Cephalic (Active)   Number of days: 5       Peripheral IV 04/11/25 Proximal;Right;Anterior Cephalic (Active)   Number of days: 3       Hemodialysis Central Access - Temporary Right  (Active)   Number of days: 4       Wound 01/21/25 Toe (Comment  which one) Right scab (Active)   Number of days: 83       Wound 01/21/25 Heel Right quarter size open dry wound (Active)   Number of days: 83       Incision 01/30/25 Toe (Comment  which one) Right (Active)   Number of days: 75       Incision 03/31/25 Heel Right;Posterior (Active)   Number of days: 15       Incision 04/14/25 Ankle Posterior;Right (Active)   Number of days: 1          
[facility]   Pharmacy Pharmacokinetic Monitoring Service - Vancomycin    Consulting Provider: Josse Han MD (ID following)  Indication: bone and joint infection  Target Concentration: Pre-Dialysis Concentration 15-20 mg/L  Day of Therapy: 2  Additional Antimicrobials: cefepime    Pertinent Laboratory Values:   Wt Readings from Last 1 Encounters:   04/11/25 117.9 kg (259 lb 14.8 oz)     Temp Readings from Last 1 Encounters:   04/11/25 98.2 °F (36.8 °C)     Recent Labs     04/10/25  1615 04/10/25  1919 04/11/25  0550 04/11/25 2004   CREATININE 4.8*  --  4.6*  --    *  --  99*  --    WBC  --    < > 11.6* 9.6    < > = values in this interval not displayed.     Pertinent Cultures:    MRSA Nasal Swab: N/A. Non-respiratory infection.    Recent vancomycin administrations                     vancomycin (VANCOCIN) 2500 mg in sodium chloride 0.9 % 500 mL IVPB (mg) 2,500 mg New Bag 04/10/25 1749                  Plan:  Switch patient to concentration-guided dosing due to renal impairment and intermittent hemodialysis. Patient started HD today  Patient received 2500 mg loading dose 4/10 @ 1749  Give 1000 mg (~10 mg/kg) now  Vancomycin concentration ordered for 4/12 @ 0600, prior to HD session   Pharmacy will continue to monitor patient and adjust therapy as indicated    Thank you for the consult,HODAN Kellogg HERO  4/11/2025 8:13 PM   
[facility]  Office: [phone]  Aung Luis, DO, Mansoor Cline, DO, Josse Han DO, Gregory Archibald, DO, Andrew Roberts MD, Mayra Medel MD, Ray Johnson MD, Romy Amador MD,  Mic Meraz MD, Rom Hayes MD, Casandra Washington MD,  Ely Burks DO, Dorie Anderson MD, Marcos Travis MD, Trey Luis DO, Delia Clemens MD,  Jorge Major DO, Rose Yi MD, Celia Guillen MD, Matthew Hyatt MD,  Sammy Galvez MD, Mary Kay Karimi MD, Edson Hay MD, Mateo Turner MD, Stan Cline MD, Radha Hoskins MD, Mikael Cm DO, Virgie Josue MD, Eran Mckenzie MD, Ely Blackman MD, Mohsin Reza, MD, George Castaneda MD, Shirley Waterhouse, CNP,  Missy Chowdhury, CNP, Mikael Cai, CNP,  Shantelle Monson, KIMI, Betty Boles, CNP, Yany Garcia, CNP, Cathie Law, CNP, Lynda Russell, CNP, An King, PA-C, Gabby Vaughn, CNP, Saray Patton, CNP,  Lacey Wilkinson, CNP, Nidhi Sinha, CNP, Hank Gilbert, PA-C, Jaclyn Neff, CNP,  Patsy Peterson, CNS, Zulay Ramsay, CNP, Martha Prince, CNP,   Mariela Benedict, CNP         [facility]   IN-PATIENT SERVICE   [facility]    Progress Note    4/13/2025    7:37 AM    Name:   Prateek Denton II  MRN:     [id number]     Acct:      [id number]   Room:   2035/2035-01  IP Day:  3  Admit Date:  4/10/2025 11:23 AM    PCP:   Lisseth Coello APRN - CNP  Code Status:  Full Code    Subjective:     C/C:   Chief Complaint   Patient presents with    Nausea    Vomiting    Abdominal Pain     Since April 2 or 3    Atrial Flutter     New for him      Chest Pain     Left side    Dizziness    Shortness of Breath     Interval History Status: Improved    Patient is resting in bed, denies any complaints of chest pain, shortness of breath, nausea vomiting, fevers or chills or acute complaints.    Brief History:     Per H&P  \"This 65-year-old male is admitted for management of multiple medical issues including atrial flutter with 
[facility]  Progress Note      NAME:  Prateek Denton II  MEDICAL RECORD NUMBER:  [id number]  AGE: 65 y.o.   GENDER: male  : 1960  TODAY'S DATE:  2025    [facility] nurse consult for VeraFlo NPWT placement for right heel wound. System placed successfully. Dressing secure and functioning properly.     Installation solution - Normal Saline  Fill volume - 8ml  Dwell time - 10 minutes  Cycle 2.5 hours    Measurements:  Negative Pressure Wound Therapy Heel Right (Active)   Dressing Status Intact w/seal 25   Canister changed? No 25   Mode Other (comment) 25   Target Pressure (mmHg) 125 25   Instillation Settings Instillation solution;Instillation volume (ml);Soak time (min);Therapy/Vacuum Time (min);Interval (min) 25   Irrigation Solution Other (comment) 25   Instillation Volume  8 mL 25   Soak Time  10 minutes 25   Therapy/Vacuum Time (hours) 2.5 25   Dressing Change Due 25 155   Number of days:        Wound 25 Heel Right (Active)   Wound Image   25   Wound Etiology Surgical 25   Dressing Status New dressing applied 25   Wound Cleansed Cleansed with saline 25   Dressing/Treatment Negative pressure wound therapy 25   Dressing Change Due 25   Wound Length (cm) 6.2 cm 25   Wound Width (cm) 2.8 cm 25   Wound Depth (cm) 1 cm 25   Wound Surface Area (cm^2) 17.36 cm^2 25   Wound Volume (cm^3) 17.36 cm^3 25   Wound Assessment Pink/red 25   Drainage Amount Small (< 25%) 25   Drainage Description Serosanguinous 25   Odor None 25   Deepali-wound Assessment Intact;Warm 04/16/25 1550   Margins Defined edges 25 1550   Number of days: 0      
[facility]  Progress Note      NAME:  Prateek Denton II  MEDICAL RECORD NUMBER:  [id number]  AGE: 65 y.o.   GENDER: male  : 1960  TODAY'S DATE:  2025      [facility] nursing following for NPWT mangement per podiatry request. Patient is ready for discharge, awaiting delivery of wound vac - anticipated for Tuesday. Recommend daily dressing with W-D Vashe wound solution that can be placed to heel wound for ease of discharge and have VAC placed at home with home health when received, discussed with podiatry service and they are agreeable with this plan   If patient does not discharge today wound-ostomy can place new vac dressing tomorrow prior to discharge home.       The [facility] nursing team can be reached via [employer] by searching \"wound ostomy\" under groups and choosing the [facility] option.     Electronically signed by JOHANA ROCHE RN on 2025 at 12:00 PM    
[facility] Continence Nursing  Progress Note      NAME:  Prateek Denton II  MEDICAL RECORD NUMBER:  [id number]  AGE: 65 y.o.   GENDER: male  : 1960  TODAY'S DATE:  2025      [facility] nurse visit this day for NPWT dressing change. Currently NPWT with VeraFlo, saline. Plan for transition to home this weekend, reviewed plan with podiatry service and plan to transition to standard NPWT without instillation at this time.   Exposed bone covered with oil gauze, black foam with bridging to dorsal foot. ACE wrap per patient request to cover.   Will follow while admitted. Next dressing change due 2025.    Measurements:  Negative Pressure Wound Therapy Heel Right (Active)   Dressing Status Intact w/seal 25 1337   Canister changed? No 25 1337   Unit Type KCI Ulta 25 1337   Mode Continuous 25 1337   Target Pressure (mmHg) 125 25 1337   $$ Dressing Changed & Charged $ Standard NPWT less than or equal to 50 sq cm PER TX 25 1337   Number of pieces placed 3 25 1337   Dressing Type Black foam;Non-adherent contact layer 25 1337   Instillation Settings Instillation solution;Instillation volume (ml);Soak time (min);Therapy/Vacuum Time (min);Interval (min) 25 08   Irrigation Solution Other (comment) 25 08   Instillation Volume  8 mL 25 08   Soak Time  10 minutes 25 08   Therapy/Vacuum Time (hours) 2.5 25 0800   Dressing Change Due 25 1337   Number of days:        Wound 25 Toe (Comment  which one) Right scab (Active)   Dressing Status Other (Comment) 25 08   Wound Cleansed Not Cleansed 25 08   Dressing/Treatment Ace wrap 25 08   Wound Assessment Other (Comment) 25 08   Drainage Amount None (dry) 25 0800   Odor None 25 08   Deepali-wound Assessment Other (Comment) 25 0800   Margins Other (Comment) 25 0800   Number of days: 86       Wound 25 Heel Right 
Occupational Therapy  [facility]  Occupational Therapy Not Seen    DATE: 2025    NAME: Prateek Denton II  MRN: [id number]   : 1960    Patient not seen this date for Occupational Therapy due to:      [] Cancel by RN or physician due to:    [] Hemodialysis    [] Critical Lab Value Level     [] Blood transfusion in progress    [] Acute or unstable cardiovascular status   _MAP < 55 or more than >115  _HR < 40 or > 130    [] Acute or unstable pulmonary status   -FiO2 > 60%   _RR < 5 or >40    _O2 sats < 85%    [] Strict Bedrest    [x] Off Unit for surgery or procedure (LEG DEBRIDEMENT INCISION AND DRAINAGE PRONE, PULSE LAVAGE)    [] Off Unit for testing       [] Pending imaging to R/O fracture    [] Refusal by Patient      [] Other      [] OT being discontinued at this time. Patient independent. No further needs.     [] OT being discontinued at this time as the patient has been transferred to hospice care. No further needs.      MARYSOL HASSAN RANI   
Occupational Therapy  Facility/Department: [facility] PROGRESSIVE CARE  Daily Treatment Note  NAME: Prateek Denton II  : 1960  MRN: [id number]    Date of Service: 2025    Discharge Recommendations:  Patient would benefit from continued therapy after discharge  OT Equipment Recommendations  Equipment Needed: Yes  Mobility Devices: ADL Assistive Devices  ADL Assistive Devices: Reacher;Sock-Aid Soft;Long-handled Shoe Horn;Long-handled Sponge;Emergency Alert System      Patient Diagnosis(es): The primary encounter diagnosis was Nausea and vomiting, unspecified vomiting type. 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     Assessment   Assessment: Pt progressing toward goals able to complete mobility w/RW for short transfers in room and able to maintain NWB.  Continuation skilled OT services are indicated to increase I and safety during functional tasks to return home at prior level of function as able.  Activity Tolerance: Patient tolerated treatment well  Discharge Recommendations: Patient would benefit from continued therapy after discharge  Equipment Needed: Yes  Mobility Devices: ADL Assistive Devices     Plan  Occupational Therapy Plan  Times Per Week: 4-5x/week 1x/day as netta  Current Treatment Recommendations: Strengthening;Balance training;Functional mobility training;Endurance training;Safety education & training;Pain management;Neuromuscular re-education;Patient/Caregiver education & training;Self-Care / ADL;Cognitive/Perceptual training;Equipment evaluation, education, & procurement;Positioning;Coordination training    Restrictions   Restrictions/Precautions: General 
Occupational Therapy  Facility/Department: [facility]  Rehabilitation Occupational Therapy Daily Treatment Note    Date: 25  Patient Name: Prateek Denton II       Room:   MRN: [id number]  Account: [id number]   : 1960  (65 y.o.) Gender: male      CARMELO Estrella reports patient is medically stable for therapy treatment this date. Chart reviewed prior to treatment and patient is agreeable for therapy.  All lines intact and patient positioned comfortably at end of treatment.  All patient needs addressed prior to ending therapy session.      Due to recent hospitalization and medical condition, pt would benefit from additional intermittent skilled therapy at time of discharge.  Please refer to the AM-PAC score for current functional status.                Past Medical History:  has a past medical history of Acute blood loss anemia, Arthritis, Back pain, Cellulitis, CKD (chronic kidney disease) stage 3, GFR 30-59 ml/min (HCC), Colostomy RUQ, Frequent headaches, Gastrointestinal hemorrhage with melena, Gout, Hemorrhagic shock (HCC), Hernia of abdominal wall, History of atrial fibrillation, History of blood transfusion, [place] (hard of hearing), HTN, Hyperkalemia, Hyperlipidemia, Incisional hernia, Kidney infection, Large bowel obstruction (HCC), Morbid obesity due to excess calories, Paroxysmal A-fib (HCC), SBO (small bowel obstruction) (HCC), Sepsis (HCC), Single kidney, Sleep apnea, T2DM, Tooth missing, Under care of team, Upper GI bleed, Wears glasses, and Wellness examination.  Past Surgical History:   has a past surgical history that includes Ankle surgery (Right, ); Carpal tunnel release (Bilateral); Cystoscopy (Right, 2020);  cath power picc triple (2020); Kidney surgery (Left, 2020); Small intestine surgery (N/A, 2020); laparotomy (N/A, 2020); Tonsillectomy; Knee arthroscopy (Bilateral); Adenoidectomy; cysto/uretero/pyeloscopy, calculus tx (Right, 10/30/2020); 
Occupational Therapy  Facility/Department: [facility] PROGRESSIVE CARE  Rehabilitation Occupational Therapy Daily Treatment Note    Date: 25  Patient Name: Prateek Denton II       Room: 1021/1021-01  MRN: [id number]  Account: [id number]   : 1960  (65 y.o.) Gender: male      CARMELO Coreas reports patient is medically stable for therapy treatment this date. Chart reviewed prior to treatment and patient is agreeable for therapy.  All lines intact and patient positioned comfortably at end of treatment.  All patient needs addressed prior to ending therapy session.      Due to recent hospitalization and medical condition, pt would benefit from additional intermittent skilled therapy at time of discharge.  Please refer to the AM-PAC score for current functional status.                Past Medical History:  has a past medical history of Acute blood loss anemia, Arthritis, Back pain, Cellulitis, CKD (chronic kidney disease) stage 3, GFR 30-59 ml/min (HCC), Colostomy RUQ, Frequent headaches, Gastrointestinal hemorrhage with melena, Gout, Hemorrhagic shock (HCC), Hernia of abdominal wall, History of atrial fibrillation, History of blood transfusion, [place] (hard of hearing), HTN, Hyperkalemia, Hyperlipidemia, Incisional hernia, Kidney infection, Large bowel obstruction (HCC), Morbid obesity due to excess calories, Paroxysmal A-fib (HCC), SBO (small bowel obstruction) (HCC), Sepsis (HCC), Single kidney, Sleep apnea, T2DM, Tooth missing, Under care of team, Upper GI bleed, Wears glasses, and Wellness examination.  Past Surgical History:   has a past surgical history that includes Ankle surgery (Right, ); Carpal tunnel release (Bilateral); Cystoscopy (Right, 2020);  cath power picc triple (2020); Kidney surgery (Left, 2020); Small intestine surgery (N/A, 2020); laparotomy (N/A, 2020); Tonsillectomy; Knee arthroscopy (Bilateral); Adenoidectomy; cysto/uretero/pyeloscopy, calculus tx (Right, 
[facility]  Office: [phone]  Aung Luis, DO, Mansoor Cline, DO, Josse Han DO, Gregory Archibald, DO, Andrew Roberts MD, Mayra Medel MD, Ray Johnson MD, Romy Amador MD,  Mic Meraz MD, Rom Hayes MD, Casandra Washington MD,  Ely Burks DO, Dorie Anderson MD, Marcos Travis MD, Trey Luis DO, Delia Clemens MD,  Jorge Major DO, Rose Yi MD, Celia Guillen MD, Matthew Hyatt MD,  Sammy Galvez MD, Mary Kay Karimi MD, Edson Hay MD, Mateo Turner MD, Stan Cline MD, Radha Hoskins MD, Mikael Cm DO, Virgie Josue MD, Eran Mckenzie MD, Ely Blackman MD, Mohsin Reza, MD, George Castaneda MD, Shirley Waterhouse, CNP,  Missy Chowdhury, CNP, Mikael Cai, CNP,  Shantelle Monson, KIMI, Betty Boles, CNP, Yany Garcia, CNP, Cathie Law, CNP, Lynda Russell, CNP, An King, PA-C, Gabby Vaughn, CNP, Saray Patton, CNP,  Lacey Wilkinson, CNP, Nidhi Sinha, CNP, Hank Gilbert, PA-C, Jaclyn Neff, CNP,  Patsy Peterson, CNS, Zulay Ramsay, CNP, Martha Prince, CNP,   Mariela Benedict, CNP         [facility]   IN-PATIENT SERVICE   [facility]    Progress Note    4/15/2025    7:49 AM    Name:   Prateek Denton II  MRN:     [id number]     Acct:      [id number]   Room:   2035/2035-01  IP Day:  5  Admit Date:  4/10/2025 11:23 AM    PCP:   Lisseth Coello APRN - CNP  Code Status:  Full Code    Subjective:     C/C:   Chief Complaint   Patient presents with    Nausea    Vomiting    Abdominal Pain     Since April 2 or 3    Atrial Flutter     New for him      Chest Pain     Left side    Dizziness    Shortness of Breath     Interval History Status: Improved    Patient is resting comfortably, denies any complaints of chest pain, shortness of breath, nausea or vomiting, fevers chills or other acute complaints.    Brief History:     Per H&P  \"This 65-year-old male is admitted for management of multiple medical issues including atrial 
[facility]  Office: [phone]  Aung Luis DO, Mansoor Cline, DO, Josse Han DO, Gregory Archibald, DO, nAdrew Roberts MD, Mayra Medel MD, Ray Johnson MD, Romy Amador MD,  Mic Meraz MD, Rom Hayes MD, Casandra Washington MD,  Ely Burks DO, Dorie Anderson MD, Marcos Travis MD, Trey Luis DO, Delia Clemens MD,  Jorge Major DO, Rose Yi MD, Celia Guillen MD, Matthew Hyatt MD,  Sammy Galvez MD, Mary Kay Karimi MD, Edson Hay MD, Mateo Turner MD, Stan Cline MD, Radha Hoskins MD, Mikael Cm DO, Virgie Josue MD, Eran Mckenzie MD, Ely Blackman MD, Mohsin Reza, MD, George Castaneda MD, Shirley Waterhouse, CNP,  Missy Chowdhury, CNP, Mikael Cai, CNP,  Shantelle Monson, DNP, Betty Boles, CNP, Yany Garcia, CNP, Cathie Law, CNP, Lynda Russell, CNP, An King, PA-C, Gabby Vaughn, CNP, Saray Patton, CNP,  Lacey Wilkinson, CNP, Nidhi Sinha, CNP, Hank Gilbert, PA-C, Jaclyn Neff, CNP,  Patsy Peterson, CNS, Zulay Ramsay, CNP, Martha Prince, CNP,   Mariela Benedict, CNP         [facility]   IN-PATIENT SERVICE   [facility]    Progress Note    4/20/2025    9:32 AM    Name:   Prateek Denton II  MRN:     [id number]     Acct:      [id number]   Room:   1021/1021-01  IP Day:  10  Admit Date:  4/10/2025 11:23 AM    PCP:   Lisseth Coello APRN - CNP  Code Status:  Full Code    Subjective:     C/C:   Chief Complaint   Patient presents with    Nausea    Vomiting    Abdominal Pain     Since April 2 or 3    Atrial Flutter     New for him      Chest Pain     Left side    Dizziness    Shortness of Breath     Interval History Status: Improved    Patient is resting comfortably in bed, has no acute complaints.  Discharge delayed yesterday due to delay in wound VAC delivery.  Denies chest pain, shortness of breath, nausea or vomiting, fevers chills or acute plaints    Brief History:     Per H&P  \"This 65-year-old male is 
Patient transported to OR for Debridement.  
Pharmacy Medication Review    The patient's list of current home medications has been reviewed.     PHYSICIANS AND NURSE PRACTITIONERS: please note there is no Transitions of Care/Med Rec Pharmacist available to address any discrepancies on inpatient orders. It is the responsibility of the attending provider to review the updates made to the home med list and adjust inpatient orders as appropriate.        Source(s) of information:patient, family, Care Everywhere, Surescripts refill report        Based on information provided by the above source(s), I have updated the patient's home med list as described below.     Removed   Azelastine HCl 137 MCG/SPRAY SOLN  magnesium oxide (MAG-OX) 400 (240 Mg) MG tablet  ferrous sulfate (FE TABS 325) 325 (65 Fe) MG EC tablet     Added None      Adjusted   sodium bicarbonate 650 MG tablet  fluticasone (FLONASE) 50 MCG/ACT nasal spray  hydrOXYzine HCl (ATARAX) 50 MG tablet     Other notes:   Patient is over due for refills on most of his medication. Patient stated that he is very low on his medications and will need refills soon.      Are any of the medications noted above considered a 'high alert' medication? no      Is the patient on warfarin at home? No          Please feel free to call me with any questions about this encounter. Thank you.      Ruben Pulliam, pharmacy technician  [facility]  Phone:  [phone]      Electronically signed by Ruben Pulliam on 4/10/2025 at 4:27 PM   Note: co-signature by the pharmacist only acknowledges that I have performed a medication review and does not attest to an evaluation of this medication review.        Prior to Admission medications    Medication Sig   sodium bicarbonate 650 MG tablet Take 1 tablet by mouth 2 times daily   albuterol sulfate HFA (PROVENTIL;VENTOLIN;PROAIR) 108 (90 Base) MCG/ACT inhaler INHALE 2 PUFFS INTO THE LUNGS 4 TIMES DAILY AS NEEDED FOR WHEEZING OR SHORTNESS OF BREATH.   Vitamin D 
Physical Therapy  DATE: 2025    NAME: Prateek Denton II  MRN: [id number]   : 1960    Patient not seen this date for Physical Therapy due to:      [] Cancel by RN or physician due to:    [] Hemodialysis    [] Critical Lab Value Level     [] Blood transfusion in progress    [] Acute or unstable cardiovascular status   _MAP < 55 or more than >115  _HR < 40 or > 130    [] Acute or unstable pulmonary status   -FiO2 > 60%   _RR < 5 or >40    _O2 sats < 85%    [] Strict Bedrest    [x] Off Unit for surgery or procedure    [] Off Unit for testing       [] Pending imaging to R/O fracture    [] Refusal by Patient      [] Other      [] PT being discontinued at this time. Patient independent. No further needs.     [] PT being discontinued at this time as the patient has been transferred to hospice care. No further needs.      Ingrid Thomas, PTA     
Physical Therapy  Facility/Department: [facility] CARE  Rehabilitation Physical Therapy Treatment Note    NAME: Prateek Denton II  : 1960 (65 y.o.)  MRN: [id number]  CODE STATUS: Full Code    Date of Service: 25     Due to recent hospitalization and medical condition, pt would benefit from additional intermittent skilled therapy at time of discharge.  Please refer to the AM-PAC score for current functional status.      Restrictions:  Restrictions/Precautions: General Precautions, Bed Alarm, Fall Risk, Weight Bearing  Lower Extremity Weight Bearing Restrictions  Right Lower Extremity Weight Bearing: Non Weight Bearing  Position Activity Restriction  Other Position/Activity Restrictions: NWBing R LE;  B UE IV;     SUBJECTIVE  Subjective: Patient up supine in bed upon therapists arrival.  Patient agreeable to PT treatment. RN states that patient is apropriate for PT treatment.       OBJECTIVE  Cognition  Overall Cognitive Status: Exceptions  Arousal/Alertness: Appropriate responses to stimuli  Following Commands: Follows multistep commands with increased time;Follows multistep commands with repitition  Attention Span: Appears intact  Memory: Decreased short term memory  Safety Judgement: Decreased awareness of need for safety  Problem Solving: Assistance required to correct errors made;Decreased awareness of errors;Assistance required to identify errors made  Insights: Decreased awareness of deficits  Initiation: Requires cues for some  Sequencing: Requires cues for some  Orientation  Overall Orientation Status: Within Functional Limits  Orientation Level: Oriented X4    Functional Mobility  Bed Mobility  Overall Assistance Level: Modified Independent;Independent  Additional Factors: Set-up;Verbal cues;Increased time to complete;Head of bed raised  Roll Left  Assistance Level: Independent;Modified independent  Roll Right  Assistance Level: Independent;Modified independent  Skilled Clinical Factors: 
Physical Therapy  Facility/Department: [facility]  Rehabilitation Physical Therapy Treatment Note    NAME: Prateek Denton II  : 1960 (65 y.o.)  MRN: [id number]  CODE STATUS: Full Code    Date of Service: 4/15/25     Due to recent hospitalization and medical condition, pt would benefit from additional intermittent skilled therapy at time of discharge.  Please refer to the AM-PAC score for current functional status.      Restrictions:  Restrictions/Precautions: General Precautions, Bed Alarm, Fall Risk, Weight Bearing  Lower Extremity Weight Bearing Restrictions  Right Lower Extremity Weight Bearing: Non Weight Bearing  Position Activity Restriction  Other Position/Activity Restrictions: NWBing R LE;  Bryant Catheter; B UE IV;     SUBJECTIVE  Subjective: Patient up supine in bed upon therapists arrival.  Patient agreeable to PT treatment. RN states that patient is apropriate for PT treatment.  Patient with SpO2 at 97% upon therapists arrival.       OBJECTIVE  Cognition  Overall Cognitive Status: Exceptions  Arousal/Alertness: Appropriate responses to stimuli  Following Commands: Follows multistep commands with increased time;Follows multistep commands with repitition  Attention Span: Appears intact  Memory: Decreased short term memory  Safety Judgement: Decreased awareness of need for safety  Problem Solving: Assistance required to correct errors made;Decreased awareness of errors;Assistance required to identify errors made  Insights: Decreased awareness of deficits  Initiation: Requires cues for some  Sequencing: Requires cues for some  Orientation  Overall Orientation Status: Within Functional Limits  Orientation Level: Oriented X4    Functional Mobility  Bed Mobility  Overall Assistance Level: Stand By Assist  Additional Factors: Set-up;Verbal cues;Increased time to complete;Head of bed raised  Roll Left  Assistance Level: Supervision;Stand by assist  Roll Right  Assistance Level: Supervision;Stand by 
Physical Therapy  Facility/Department: [facility] CARE  Rehabilitation Physical Therapy Treatment Note    NAME: Prateek Denton II  : 1960 (65 y.o.)  MRN: [id number]  CODE STATUS: Full Code    Date of Service: 25     Due to recent hospitalization and medical condition, pt would benefit from additional intermittent skilled therapy at time of discharge.  Please refer to the AM-PAC score for current functional status.      Restrictions:  Restrictions/Precautions: General Precautions, Bed Alarm, Fall Risk, Weight Bearing  Lower Extremity Weight Bearing Restrictions  Right Lower Extremity Weight Bearing: Non Weight Bearing  Position Activity Restriction  Other Position/Activity Restrictions: NWBing R LE;  B UE IV;     SUBJECTIVE  Subjective: Patient up supine in bed upon therapists arrival.  Patient agreeable to PT treatment. RN states that patient is apropriate for PT treatment.       OBJECTIVE  Cognition  Overall Cognitive Status: Exceptions  Arousal/Alertness: Appropriate responses to stimuli  Following Commands: Follows multistep commands with increased time;Follows multistep commands with repitition  Attention Span: Appears intact  Memory: Decreased short term memory  Safety Judgement: Decreased awareness of need for safety  Problem Solving: Assistance required to correct errors made;Decreased awareness of errors;Assistance required to identify errors made  Insights: Decreased awareness of deficits  Initiation: Requires cues for some  Sequencing: Requires cues for some  Orientation  Overall Orientation Status: Within Functional Limits  Orientation Level: Oriented X4    Functional Mobility  Bed Mobility  Overall Assistance Level: Modified Independent;Independent  Additional Factors: Set-up;Verbal cues;Increased time to complete;Head of bed raised  Roll Left  Assistance Level: Independent;Modified independent  Roll Right  Assistance Level: Independent;Modified independent  Skilled Clinical Factors: 
Physical Therapy  Facility/Department: [facility]  Rehabilitation Physical Therapy Treatment Note    NAME: Prateek Denton II  : 1960 (65 y.o.)  MRN: [id number]  CODE STATUS: Full Code    Date of Service: 25     Due to recent hospitalization and medical condition, pt would benefit from additional intermittent skilled therapy at time of discharge.  Please refer to the AM-PAC score for current functional status.      Restrictions:  Restrictions/Precautions: General Precautions, Bed Alarm, Fall Risk, Weight Bearing  Lower Extremity Weight Bearing Restrictions  Right Lower Extremity Weight Bearing: Non Weight Bearing  Position Activity Restriction  Other Position/Activity Restrictions: NWBing R LE;  Bryant Catheter; B UE IV;     SUBJECTIVE  Subjective: Patient up supine in bed upon therapists arrival.  Patient agreeable to PT treatment. CARMELO Ross states that patient is apropriate for PT treatment.  Patient with SpO2 at 98% upon therapists arrival.       OBJECTIVE  Cognition  Overall Cognitive Status: Exceptions  Arousal/Alertness: Appropriate responses to stimuli  Following Commands: Follows multistep commands with increased time;Follows multistep commands with repitition  Attention Span: Appears intact  Memory: Decreased short term memory  Safety Judgement: Decreased awareness of need for safety  Problem Solving: Assistance required to correct errors made;Decreased awareness of errors;Assistance required to identify errors made  Insights: Decreased awareness of deficits  Initiation: Requires cues for some  Sequencing: Requires cues for some  Orientation  Overall Orientation Status: Within Functional Limits  Orientation Level: Oriented X4    Functional Mobility  Bed Mobility  Overall Assistance Level: Stand By Assist  Additional Factors: Set-up;Verbal cues;Increased time to complete;Head of bed raised  Roll Left  Assistance Level: Supervision;Stand by assist  Roll Right  Assistance Level: Supervision;Stand by 
Physical Therapy  Facility/Department: [facility] CARE  Rehabilitation Physical Therapy Treatment Note    NAME: Prateek Denton II  : 1960 (65 y.o.)  MRN: [id number]  CODE STATUS: Full Code    Date of Service: 25     Due to recent hospitalization and medical condition, pt would benefit from additional intermittent skilled therapy at time of discharge.  Please refer to the AM-PAC score for current functional status.      Restrictions:  Restrictions/Precautions: General Precautions, Bed Alarm, Fall Risk, Weight Bearing, Contact Precautions  Lower Extremity Weight Bearing Restrictions  Right Lower Extremity Weight Bearing: Non Weight Bearing  Position Activity Restriction  Other Position/Activity Restrictions: NWBing R LE;  B UE IV;  Contact (MRSA); Wound vac     SUBJECTIVE  Subjective: Patient up in recliner upon therapists arrival.  Patient agreeable to PT treatment. RN states patient is appropriate for PT treatment.       OBJECTIVE  Cognition  Overall Cognitive Status: Exceptions  Arousal/Alertness: Appropriate responses to stimuli  Following Commands: Follows multistep commands with increased time;Follows multistep commands with repitition  Attention Span: Appears intact  Memory: Decreased short term memory  Safety Judgement: Decreased awareness of need for safety  Problem Solving: Assistance required to correct errors made;Decreased awareness of errors;Assistance required to identify errors made  Insights: Decreased awareness of deficits  Initiation: Does not require cues  Sequencing: Requires cues for some  Orientation  Overall Orientation Status: Within Functional Limits  Orientation Level: Oriented X4    Functional Mobility  Bed Mobility  Overall Assistance Level:  (unable to assess as patient up in recliner upon therapists arrival and retires to recliner upon completion of PT treatment.)  Balance  Sitting Balance: Modified independent   Standing Balance: Stand by assistance  Standing 
Pt remained calm and cooperative today and on RA. Pt up to commode with stby and pivot. Non-wt bearing on rt foot. Wound vac remains in place. Pt had a BM today. Loose, watery stool. PRN 20 mg IV labetalol given at 2340 for a SBP of 173. Improved to 144 post administration. Will continue with care plan.  
[facility]  Office: [phone]  Aung Luis DO, Mansoor Cline, DO, Josse Han DO, Gregory Archibald, DO, Andrew Roberts MD, Mayra Medel MD, Ray Johnson MD, Romy Amador MD,  Mic Meraz MD, Rom Hayes MD, Casandra Washington MD,  Ely Burks DO, Dorie Anderson MD, Marcos Travis MD, Trey Luis DO, Delia Clemens MD,  Jorge Major DO, Rose Yi MD, Celia Guillen MD, Matthew Hyatt MD,  Sammy Galvez MD, Mary Kay Karimi MD, Edson Hay MD, Mateo Turner MD, Stan Cline MD, Radha Hoskins MD, Mikael Cm DO, Virgie Josue MD, Eran Mckenzie MD, Ely Blackman MD, Mohsin Reza, MD, George Castaneda MD, Shirley Waterhouse, CNP,  Missy Chowdhury, CNP, Mikael Cai, CNP,  Shantelle Monson, KIMI, Betty Boles, CNP, Yany Garcia, CNP, Cathie Law, CNP, Lynda Russell, CNP, An King, PA-C, Gabby Vaughn, CNP, Saray Patton, CNP,  Lacey Wilkinson, CNP, Nidhi Sinha, CNP, Hank Gilbert, PA-C, Jaclyn Neff, CNP,  Patsy Peterson, CNS, Zulay Ramsay, CNP, Martha Prince, CNP,   Mariela Benedict, CNP         [facility]   IN-PATIENT SERVICE   [facility]    Progress Note    4/19/2025    10:44 AM    Name:   Prateek Denton II  MRN:     [id number]     Acct:      [id number]   Room:   1021/1021-01  IP Day:  9  Admit Date:  4/10/2025 11:23 AM    PCP:   Lisseth Coello APRN - CNP  Code Status:  Full Code    Subjective:     C/C:   Chief Complaint   Patient presents with    Nausea    Vomiting    Abdominal Pain     Since April 2 or 3    Atrial Flutter     New for him      Chest Pain     Left side    Dizziness    Shortness of Breath     Interval History Status: Improved    Patient is resting in bed and denies any complaints of chest pain, shortness of breath, nausea vomiting, fevers or chills.    Brief History:     Per H&P  \"This 65-year-old male is admitted for management of multiple medical issues including atrial flutter with rapid ventricular 
Spiritual Health History and Assessment/Progress Note  [facility]    (P) Initial Encounter,  ,  ,      Name: Prateek Denton II MRN: [id number]    Age: 65 y.o.     Sex: male   Language: English   Latter-day: Mandaeism   Atrial flutter with rapid ventricular response (HCC)     Date: 4/14/2025            Total Time Calculated: (P) 13 min              Spiritual Assessment began in [facility]        Referral/Consult From: (P) Rounding   Encounter Overview/Reason: (P) Initial Encounter  Service Provided For: (P) Patient    Patient engaged readily and pleasantly with  sharing life review of present surgery, marriage, grandchildren, work career at [employer] as  but now retired. Patient expressed having no present spiritual care needs or concerns but was appreciative of  visit and support shaking 's hand.    Irais, Belief, Meaning:   Patient identifies as spiritual  Family/Friends No family/friends present      Importance and Influence:  Patient has no beliefs influential to healthcare decision-making identified during this visit  Family/Friends No family/friends present    Community:  Patient feels well-supported. Support system includes: Spouse/Partner  Family/Friends No family/friends present    Assessment and Plan of Care:     Patient Interventions include: Facilitated expression of thoughts and feelings and Facilitated life review and/ or legacy  Family/Friends Interventions include: No family/friends present    Patient Plan of Care: Spiritual Care available upon further referral  Family/Friends Plan of Care: Spiritual Care available upon further referral    Electronically signed by Chaplain Dontae on 4/14/2025 at 12:45 PM   
[facility]  Office: [phone]  Aung Luis, DO, Mansoor Cline, DO, Josse Han DO, Gregory Archibald, DO, Andrew Roberts MD, Mayra Medel MD, Ray Johnson MD, Romy Amador MD,  Mic Meraz MD, Rom Hayes MD, Casandra Washington MD,  Ely Burks DO, Dorie Anderson MD, Marcos Travis MD, Trey Luis DO, Delia Clemens MD,  Jorge Major DO, Rose Yi MD, Celia Guillen MD, Matthew Hyatt MD,  Sammy Galvez MD, Mary Kay Karimi MD, Edson Hay MD, Mateo Turner MD, Stan Cline MD, Radha Hoskins MD, Mikael Cm DO, Virgie Josue MD, Erna Mckenzie MD, Ely Blackman MD, Mohsin Reza, MD, George Castaneda MD, Shirley Waterhouse, CNP,  Missy Chowdhury, CNP, Mikael Cai, CNP,  Shantelle Monson, DNP, Betty Boles, CNP, Yany Garcia, CNP, Cathie Law, CNP, Lynda Russell, CNP, An King, PA-C, Gabby Vaughn, CNP, Saray Patton, CNP,  Lacey Wilkinson, CNP, Nidhi Sinha, CNP, Hank Gilbert, PA-C, Jaclyn Neff, CNP,  Patsy Peterson, CNS, Zulay Ramsay, CNP, Martha Prince, CNP,   Mariela Benedict, CNP         [facility]   IN-PATIENT SERVICE   [facility]    Progress Note    4/14/2025    2:42 PM    Name:   Prateek Denton II  MRN:     [id number]     Acct:      [id number]   Room:   2035/2035-01  IP Day:  4  Admit Date:  4/10/2025 11:23 AM    PCP:   Lisseth Coello APRN - CNP  Code Status:  Full Code    Subjective:     C/C:   Chief Complaint   Patient presents with    Nausea    Vomiting    Abdominal Pain     Since April 2 or 3    Atrial Flutter     New for him      Chest Pain     Left side    Dizziness    Shortness of Breath     Interval History Status: Improved    Patient seen postop.  Denies any complaints of chest pain, shortness of breath, nausea or vomiting, fevers or chills or acute complaints.    Brief History:     Per H&P  \"This 65-year-old male is admitted for management of multiple medical issues including atrial flutter with rapid 
[facility]  Office: [phone]  Aung Luis DO, Mansoor Cline, DO, Josse Han DO, Gregory Archibald, DO, Andrew Roberts MD, Mayra Medel MD, Ray Johnson MD, Romy Amador MD,  Mic Meraz MD, Rom Hayes MD, Casandra Washington MD,  Ely Burks DO, Dorie Anderson MD, Marcos Travis MD, Trey Luis DO, Delia Clemens MD,  Jorge Major DO, Rose Yi MD, Celia Guillen MD, Matthew Hyatt MD,  Sammy Galvez MD, Mary Kay Karimi MD, Edson Hay MD, Mateo Turner MD, Stan Cline MD, Radha Hoskins MD, Mikael Cm DO, Virgie Josue MD, Eran Mckenzie MD, Ely Blackman MD, Mohsin Reza, MD, George Castaneda MD, Shirley Waterhouse, CNP,  Missy Chowdhury, CNP, Mikael Cai, CNP,  Shantelle Monson, DNP, Betty Boles, CNP, Yany Garcia, CNP, Cathie Law, CNP, Lynda Russell, CNP, An King, PA-C, Gabby Vaughn, CNP, Saray Patton, CNP,  Lacey Wilkinson, CNP, Nidhi Sinha, CNP, Hank Gilbert, PA-C, Jaclyn Neff, CNP,  Patsy Peterson, CNS, Zulay Ramsay, CNP, Martha Prince, CNP,   Mariela Benedict, CNP         [facility]   IN-PATIENT SERVICE   [facility]    Progress Note    4/11/2025    7:12 AM    Name:   Parteek Denton II  MRN:     [id number]     Acct:      [id number]   Room:   2035/2035-01  IP Day:  1  Admit Date:  4/10/2025 11:23 AM    PCP:   Lisseth Coello APRN - CNP  Code Status:  Full Code    Subjective:     C/C:   Chief Complaint   Patient presents with    Nausea    Vomiting    Abdominal Pain     Since April 2 or 3    Atrial Flutter     New for him      Chest Pain     Left side    Dizziness    Shortness of Breath     Interval History Status: not changed.     Patient was resting in bed, continues to have shortness of breath, nausea is unchanged, no further vomiting.  Denies any other acute complaints.  Denies any fevers or chills    Brief History:     Per H&P  \"This 65-year-old male is admitted for management of multiple medical 
Wound Ostomy Continence Nursing      NAME:  Prateek Denton II  MEDICAL RECORD NUMBER:  [id number]  AGE: 65 y.o.   GENDER: male  : 1960  TODAY'S DATE:  2025    [facility] nursing consult noted. Podiatry service on case and managing wound care needs at this time. Will defer eval and treat and sign off case. Please call or reconsult if further needs arise. Thank you.     Electronically signed by JOHANA ROCHE RN on 2025 at 10:09 AM   
Wound vac removed in anticipation of discharge. Wet to dry dressing performed with vashe solution. Patient tolerated well.   
2025.     The patient has been following with the podiatry service and he continued to have an ulcer of the right heel with a fat layer exposed for which on 3/31/2025 the patient underwent partial removal of the calcaneus with excisional debridement of the right ankle wound and later closure of the wound on the right.     The patient reports that on about  he started having abdominal pain that he described as \"hunger pains\" nausea, vomiting and could not really keep anything down.     The patient came into the emergency department and was found to have an elevated white blood cell count of 13.8, CRP of 10, BUN of 118 creatinine of 5.1     The patient had his wound evaluated and was found to have a bulky necrotic wound base with some periwound sloughing and some malodorous drainage and soft tissue crepitus.  The wound probed to the Achilles and there is a sinus tract proximally and distally noted.  There is some surrounding cellulitis and the patient was admitted for a wound infection and acute kidney injury.     I was asked to evaluate and help with antibiotic choice.                 The patient was taken to the operating room 2025 and underwent incision and drainage of the right leg wound with exposed bone of the right calcaneus      Current evaluation:2025    BP (!) 146/86   Pulse 66   Temp 97.9 °F (36.6 °C) (Oral)   Resp 19   Ht 1.803 m (5' 10.98\")   Wt 117.9 kg (260 lb)   SpO2 98%   BMI 36.28 kg/m²     Temperature Range: Temp: 97.9 °F (36.6 °C) Temp  Av.9 °F (36.6 °C)  Min: 97.5 °F (36.4 °C)  Max: 98.1 °F (36.7 °C)  The patient is seen and evaluated at bedside and is awake and alert in no acute distress.  No subjective fever or chills.  No abdominal pain nausea vomiting or diarrhea.  No chest pains or palpitations.    The patient was taken to interventional radiology underwent tunneled PICC line placement    Review of Systems   Constitutional: Negative.    HENT: 
for wound VAC application this afternoon.  Last PE: 4/15/2025.  LLE:  Neurovascular intact.  Musculoskeletal: EHL/FHL/TA/GS complex motor intact. Non tender to palpation. Compartments soft and easily compressible  Dermatologic: No ecchymoses, abrasions, deformity, or lacerations. Skin intact.      RLE:  Vascular:  DP and PT pulses are  palpable. DP, PT, peroneal pulses are faint audible on Doppler. CFT <3 seconds to all digits.  Hair growth is absent to the level of the digits.  Positive edema.    Neuro: Sural/saphenous/SPN/DPN/plantar nerve distribution SILT  Musculoskeletal: EHL/FHL/TA/GS complex motor intact, guarded. Non tender to palpation posterior leg and posterior rear foot. Compartments soft and easily compressible  Dermatologic:   Surgical incision and full-thickness surgical wound noted to the posterior leg and heel and measures approximately 10 x 5 cm.  Base is granular.  No soft tissue emphysema or crepitus, no purulent drainage.    Clinical Images:        Imaging:   IR NONTUNNELED VASCULAR CATHETER > 5 YEARS   Final Result   Successful ultrasound and fluoroscopy guided right internal jugular vein   non-tunneled hemodialysis catheter insertion.         CT TIBIA FIBULA RIGHT WO CONTRAST   Final Result   1. Ulceration soft tissue dressing overlying the posterior ankle and Achilles   tendon. Subjacent severe thickening of the Achilles tendon. Severe underlying   Achilles tendinosis.  Irregularity of the posterior calcaneal tuberosity with   adjacent soft tissue gas underlying the soft tissue dressing and ulceration   concerning for osteomyelitis and osseous destruction.   2. Mild atrophy and fatty degeneration of the soleus and lateral head   gastrocnemius muscles.   3. No organized fluid collection.   4. 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   5. Mild degenerative changes of the midfoot and TMT joints.   6. Severe narrowing of the lateral compartment of the right knee. Small   suprapatellar 
  adjacent soft tissue gas underlying the soft tissue dressing and ulceration   concerning for osteomyelitis and osseous destruction.   2. Mild atrophy and fatty degeneration of the soleus and lateral head   gastrocnemius muscles.   3. No organized fluid collection.   4. 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   5. Mild degenerative changes of the midfoot and TMT joints.   6. Severe narrowing of the lateral compartment of the right knee. Small   suprapatellar joint effusion. Mild-to-moderate tricompartmental   osteoarthrosis.         XR ACUTE ABD SERIES CHEST 1 VW   Final Result   No acute airspace disease identified.      No evidence for bowel obstruction or free air.  Moderate stool burden in the   distal colon and rectum.         XR FOOT RIGHT (MIN 3 VIEWS)   Final Result   Soft tissue ulcer along the posterior aspect of the ankle with soft tissue   gas and an apparent osseous erosion in the calcaneus.  This could represent   osteomyelitis.         IR NONTUNNELED VASCULAR CATHETER > 5 YEARS    (Results Pending)     Cultures:   E. coli and Staph aureus    Assessment    65 y.o. 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 *       Plan   - Plan for OR once medically stabilized.  Currently on the schedule for 4/14/2025 with 
  osteoarthrosis.         XR ACUTE ABD SERIES CHEST 1 VW   Final Result   No acute airspace disease identified.      No evidence for bowel obstruction or free air.  Moderate stool burden in the   distal colon and rectum.         XR FOOT RIGHT (MIN 3 VIEWS)   Final Result   Soft tissue ulcer along the posterior aspect of the ankle with soft tissue   gas and an apparent osseous erosion in the calcaneus.  This could represent   osteomyelitis.           Cultures:   Culture 4/10/2025: E. Coli, MRSA    Assessment    65 y.o. male being seen for:     - Surgical dehiscence with infection, right leg  -Possible necrotizing soft tissue infection  -Achilles tendon rupture, right  -Calcaneal osteomyelitis, right foot    Principal Problem:    Atrial flutter with rapid ventricular response ([facility])  Active Problems:    Essential hypertension    Type 2 diabetes mellitus with diabetic nephropathy, with long-term current use of insulin ([facility])    Acute kidney injury superimposed on CKD    Hyperkalemia    Elevated serum creatinine    Sepsis (HCC)    Secondary hypercoagulable state    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 *       Plan   - Plan for OR for repeat debridement versus delayed clinical primary closure with wound VAC application this Friday or Thursday.  Date and time TBD  -Plan for installation VAC application with wound care and [employer] rep tomorrow with the resident on-call.  Please have nursing obtain [employer] wound VAC and  appropriate dressings.  - Patient admitted for: Sepsis  - NIVS will be ordered on the floor for limb salvage intervention. Ordered STAT  - ID consulted: IV cefepime, flagyl. Trend WBC, ESR, CRP daily. Follow cultures  - Consults: IM for medical management, nephrology, ID   - Diet: carb control  - DVT ppx: lovenox  - Pain control:  panel ordered  
gas underlying the soft tissue dressing and ulceration   concerning for osteomyelitis and osseous destruction.   2. Mild atrophy and fatty degeneration of the soleus and lateral head   gastrocnemius muscles.   3. No organized fluid collection.   4. 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   5. Mild degenerative changes of the midfoot and TMT joints.   6. Severe narrowing of the lateral compartment of the right knee. Small   suprapatellar joint effusion. Mild-to-moderate tricompartmental   osteoarthrosis.         XR ACUTE ABD SERIES CHEST 1 VW   Final Result   No acute airspace disease identified.      No evidence for bowel obstruction or free air.  Moderate stool burden in the   distal colon and rectum.         XR FOOT RIGHT (MIN 3 VIEWS)   Final Result   Soft tissue ulcer along the posterior aspect of the ankle with soft tissue   gas and an apparent osseous erosion in the calcaneus.  This could represent   osteomyelitis.           Cultures:   Culture 4/10/2025: E. Coli, MRSA    Assessment    65 y.o. 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 *       Plan   - Plan for OR on 4/14/2025 with Dr. Del Valle.  BUN and Cr improved.  Consent signed and in chart.  NPO at midnight  - Patient admitted for: Sepsis  - NIVS 
wound.   Neurological: Negative.    Psychiatric/Behavioral: Negative.         Physical Examination :     Physical Exam  Constitutional:       Appearance: He is well-developed.   HENT:      Head: Normocephalic and atraumatic.   Cardiovascular:      Rate and Rhythm: Normal rate.      Heart sounds: Normal heart sounds.      No friction rub. No gallop.   Pulmonary:      Effort: Pulmonary effort is normal.      Breath sounds: Normal breath sounds. No wheezing.   Abdominal:      General: Bowel sounds are normal.      Palpations: Abdomen is soft. There is no mass.      Tenderness: There is no abdominal tenderness.   Musculoskeletal:         General: Normal range of motion.      Cervical back: Normal range of motion and neck supple.   Lymphadenopathy:      Cervical: No cervical adenopathy.   Skin:     General: Skin is warm and dry.      Comments: The patient has a dressing in the right lower extremity which was not removed   Neurological:      Mental Status: He is alert and oriented to person, place, and time.         Laboratory data:   I have independently reviewed the followinglabs:  CBC with Differential:   Recent Labs     04/14/25  0302 04/15/25  0332   WBC 8.7 14.1*   HGB 9.4* 9.6*   HCT 29.0* 28.7*   * 148   LYMPHOPCT 13* 7*   MONOPCT 12 5   EOSPCT 4 0*     BMP:   Recent Labs     04/14/25  0302 04/15/25  0332    136   K 4.0 4.5    102   CO2 22 23   BUN 34* 46*   CREATININE 3.9* 4.2*     Hepatic Function Panel:   Recent Labs     04/12/25  1702   BILIDIR 0.1   IBILI 0.1   BILITOT 0.2   ALKPHOS 69   ALT 20   AST 21         Lab Results   Component Value Date/Time    PROCAL 1.69 03/12/2024 05:58 AM    PROCAL 0.18 09/22/2020 05:20 PM    PROCAL 0.20 09/01/2020 02:40 PM     Lab Results   Component Value Date/Time    CRP 35.7 04/14/2025 03:02 AM    CRP 37.4 04/13/2025 06:10 AM    CRP 25.9 04/12/2025 06:08 AM     Lab Results   Component Value Date    SEDRATE 34 (H) 04/14/2025         Lab Results   Component 
[facility] OR    KNEE ARTHROSCOPY Bilateral     LAPAROTOMY N/A 09/23/2020    LAPAROTOMY EXPLORATORY performed by Jagjit Doty DO at [facility] OR    SMALL INTESTINE SURGERY N/A 09/02/2020    EXPLORATORY LAPAROTOMY, RESECTION OF PROXIMAL JEJUNUM AND DESCENDING COLON WITH MOBILIZATION OF SPLENIC FLEXURE AND PRIMARY ANASTAMOSISOF SMALL BOWEL AND COLON, PLACEMENT OF GASTROSTOMY TUBE performed by Jagjit Doty DO at [facility] OR    SMALL INTESTINE SURGERY N/A 12/18/2020    EXPLORATORY LAPAROTOMY,COLOSTOMY REVERSAL, TAP BLOCK DONE IN OR BY DR PACHECO performed by Jagjit Doty DO at [facility] OR    TOE AMPUTATION Right 1/30/2025    TOE AMPUTATION performed by Trey Del Valle DPM at [facility] OR    TONSILLECTOMY      REMOVED 3 TIMES  AS A CHILD    TYMPANOSTOMY TUBE PLACEMENT      UPPER GASTROINTESTINAL ENDOSCOPY N/A 01/05/2022    EGD BIOPSY performed by Olamide Valdez MD at [facility] Endoscopy    UPPER GASTROINTESTINAL ENDOSCOPY N/A 06/21/2023    EGD ESOPHAGOGASTRODUODENOSCOPY performed by Juan Francis MD at [facility] OR    UPPER GASTROINTESTINAL ENDOSCOPY N/A 06/22/2023    EGD CONTROL HEMORRHAGE WITH CLIP APPLICATION performed by Trey Dumont MD at [facility] OR    UPPER GASTROINTESTINAL ENDOSCOPY N/A 06/23/2023    EGD ESOPHAGOGASTRODUODENOSCOPY performed by Trey Dumont MD at [facility] OR    UPPER GASTROINTESTINAL ENDOSCOPY N/A 08/09/2023    EGD BIOPSY performed by Trey Dumont MD at [facility] OR    UPPER GASTROINTESTINAL ENDOSCOPY N/A 03/14/2024    ESOPHAGOGASTRODUODENOSCOPY BIOPSY performed by Juan Francis MD at [facility] OR    VENTRAL HERNIA REPAIR N/A 5/8/2024    OPEN INCISIONAL HERNIA REPAIR WITH MESH performed by Jagjit Doty DO at [facility] OR        Family History:   Family History   Problem Relation Age of Onset    Other Mother         AORTIC BYPASS LED TO KIDNEY FAILURE    Kidney Disease Mother     High Blood Pressure Father     Diabetes Father     Stroke Father     Stroke Maternal Grandmother     Stroke Maternal Grandfather  
about 2.0 standard drinks of alcohol per week. He reports current drug use. Frequency: 1.00 time per week. Drug: Marijuana (Weed).     Family History:   Family History   Problem Relation Age of Onset    Other Mother         AORTIC BYPASS LED TO KIDNEY FAILURE    Kidney Disease Mother     High Blood Pressure Father     Diabetes Father     Stroke Father     Stroke Maternal Grandmother     Stroke Maternal Grandfather        Vitals:  BP (!) 149/89   Pulse 68   Temp 97.3 °F (36.3 °C) (Oral)   Resp 16   Ht 1.803 m (5' 11\")   Wt 117.9 kg (260 lb)   SpO2 99%   BMI 36.26 kg/m²   Temp (24hrs), Av.9 °F (36.6 °C), Min:97.3 °F (36.3 °C), Max:98.3 °F (36.8 °C)    Recent Labs     04/15/25  1539 04/15/25  2018 04/16/25  0747 25  1202   POCGLU 135* 142* 93 140*       I/O (24Hr):    Intake/Output Summary (Last 24 hours) at 2025 1347  Last data filed at 2025 1041  Gross per 24 hour   Intake 760 ml   Output 1500 ml   Net -740 ml       Labs:  Hematology:  Recent Labs     04/14/25  0302 04/15/25  0332 04/16/25  0347   WBC 8.7 14.1* 10.2   RBC 3.28* 3.31* 2.98*   HGB 9.4* 9.6* 8.6*   HCT 29.0* 28.7* 26.0*   MCV 88.4 86.7 87.2   MCH 28.7 29.0 28.9   MCHC 32.4 33.4 33.1   RDW 13.0 12.9 13.2   * 148 135*   MPV 12.4 12.4 12.5   SEDRATE 34*  --  34*   CRP 35.7*  --  16.4*     Chemistry:  Recent Labs     04/14/25  0302 04/15/25  0332 04/16/25  0347    136 137   K 4.0 4.5 4.4    102 105   CO2 22 23 22   GLUCOSE 97 135* 102   BUN 34* 46* 51*   CREATININE 3.9* 4.2* 4.4*   ANIONGAP 14 12 11   LABGLOM 16* 15* 14*   CALCIUM 8.1* 8.4* 8.2*     Recent Labs     04/15/25  0744 04/15/25  1132 04/15/25  1539 04/15/25  2018 04/16/25  0747 25  1202   POCGLU 128* 187* 135* 142* 93 140*     ABG:  Lab Results   Component Value Date/Time    POCPH 7.180 2024 09:48 AM    PHART 7.363 2020 03:06 AM    POCPCO2 24.4 2024 09:48 AM    HKB2VZX 34.3 2020 03:06 AM    POCPO2 104.5 2024 09:48 
09/23/2020 03:06 AM    POCHCO3 9.1 02/29/2024 09:48 AM    WDA3AMO 19.0 09/23/2020 03:06 AM    NBEA 17.4 02/29/2024 09:48 AM    PBEA NOT REPORTED 09/23/2020 03:06 AM    PRZ6FZH 26 09/03/2020 04:26 AM    YIHP4KDP 96.5 02/29/2024 09:48 AM    T7VSCEBI 99.3 09/23/2020 03:06 AM    FIO2 28.0 02/29/2024 09:48 AM     Lab Results   Component Value Date/Time    SPECIAL Site: Tissue 04/14/2025 08:16 AM     Lab Results   Component Value Date/Time    CULTURE CULTURE IN PROGRESS 04/14/2025 08:16 AM       Radiology:  CT TIBIA FIBULA RIGHT WO CONTRAST  Result Date: 4/10/2025  1. Ulceration soft tissue dressing overlying the posterior ankle and Achilles tendon. Subjacent severe thickening of the Achilles tendon. Severe underlying Achilles tendinosis.  Irregularity of the posterior calcaneal tuberosity with adjacent soft tissue gas underlying the soft tissue dressing and ulceration concerning for osteomyelitis and osseous destruction. 2. Mild atrophy and fatty degeneration of the soleus and lateral head gastrocnemius muscles. 3. No organized fluid collection. 4. 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 5. Mild degenerative changes of the midfoot and TMT joints. 6. Severe narrowing of the lateral compartment of the right knee. Small suprapatellar joint effusion. Mild-to-moderate tricompartmental osteoarthrosis.     XR FOOT RIGHT (MIN 3 VIEWS)  Result Date: 4/10/2025  Soft tissue ulcer along the posterior aspect of the ankle with soft tissue gas and an apparent osseous erosion in the calcaneus.  This could represent osteomyelitis.     XR ACUTE ABD SERIES CHEST 1 VW  Result Date: 4/10/2025  No acute airspace disease identified. No evidence for bowel obstruction or free air.  Moderate stool burden in the distal colon and rectum.       Physical Examination:        General appearance:  alert, cooperative and no distress  Mental Status:  oriented to person, place and time and normal affect  Lungs:  clear to auscultation 
appearance:  alert, cooperative and no distress  Mental Status:  oriented to person, place and time and normal affect  Lungs:  clear to auscultation bilaterally, normal effort  Heart:  regular rate and rhythm, no murmur  Abdomen:  soft, nontender, nondistended, normal bowel sounds, no masses, hepatomegaly, splenomegaly  Extremities:  no edema, redness, tenderness in the calves, wound VAC in place  Skin:  no gross lesions, rashes, induration    Assessment:        Hospital Problems           Last Modified POA    * (Principal) Atrial flutter with rapid ventricular response ([facility]) 4/16/2025 Yes    Essential hypertension 4/10/2025 Yes    Type 2 diabetes mellitus with diabetic nephropathy, with long-term current use of insulin ([facility]) 4/10/2025 Yes    Acute kidney injury superimposed on CKD 4/16/2025 Yes    Hyperkalemia 4/10/2025 Yes    Elevated serum creatinine 4/10/2025 Yes    Sepsis ([facility]) 4/10/2025 Yes    Secondary hypercoagulable state 4/10/2025 Yes    Diabetic ulcer of right heel associated with diabetes mellitus due to underlying condition, limited to breakdown of skin 4/10/2025 Yes    Nausea and vomiting 4/13/2025 Yes    Gas gangrene ([facility]) 4/16/2025 Yes       Plan:        Cardiac telemetry  Antibiotics per ID, ceftriaxone and daptomycin through May 18  Amiodarone as ordered  Insulin scale to optimize glycemic control  Wound VAC per podiatry, to be delivered tomorrow for home use  PT and OT  Trend labs, correct electrolytes as needed  Discharge home today    Josse Han DO  4/21/2025  9:38 AM   
follow through of NWBing R LE        Circulation/Endurance Exercises: ankle pumps  Pressure Relief Exercises: Pt is at risk for skin breakdown.  Pt educated on pressure relief measures. Patient positioned appropriately after treatment with all high risk areas elevated or propped.    Pt reports being comfortable at end of treatment.        Functional Mobility Circuit Training: STS x 2 using RW  Disease-specific Exercises: educated patient on importance of maintaining RLE NWB per podiatry note        Edu provided on complications resulting from immobility and decreased participation such as increased risk for blood clots, pneumonia, constipation, muscle atrophy, and decreased independence. Pt verbalized good understanding.         AM-PAC - Mobility  AM-PAC Basic Mobility - Inpatient   How much help is needed turning from your back to your side while in a flat bed without using bedrails?: A Little  How much help is needed moving from lying on your back to sitting on the side of a flat bed without using bedrails?: A Little  How much help is needed moving to and from a bed to a chair?: A Lot  How much help is needed standing up from a chair using your arms?: A Little  How much help is needed walking in hospital room?: A Lot  How much help is needed climbing 3-5 steps with a railing?: Total  AM-PAC Inpatient Mobility Raw Score : 14  AM-PAC Inpatient T-Scale Score : 38.1  Mobility Inpatient CMS 0-100% Score: 61.29  Mobility Inpatient CMS G-Code Modifier : CL        Goals  Short Term Goals  Time Frame for Short Term Goals: 12 visits  Short Term Goal 1: Patient will demonstrate bed mobility with MI  Short Term Goal 2: Patient will verbalize and demonstrate follow through of pressure relief techniques in order to promote healing and maintain good skin integrity  Short Term Goal 3: Patient will verbalize and demonstrate follow through of energy conservation techniques including compliance with incentive spirometer in order to 
education needed                                                                AM-PAC - ADL   12    Co-treatment with PT warranted secondary to decreased safety and independence requiring 2 skilled therapy professionals to address individual discipline's goals. OT addressing preparation for ADL transfer, sitting/standing balance for increased ADL performance, sitting/activity tolerance, functional reaching, environmental safety/scanning, fall prevention, functional mobility for ADL transfers, ability to sequence and follow directions, bed mobility tech, and functional UE strength.        Goals  Short Term Goals  Time Frame for Short Term Goals: by discharge, pt to demo  Short Term Goal 1: bed mob tasks with use of rail as needed to MOD I-I.  Short Term Goal 2: I with BUE HEP with use of handouts to maintain functional use.  Short Term Goal 3: UB ADL to set up and LB ADL to CGA with use of AD/AE as needed.  Short Term Goal 4: ADL transfers and functional mob with AD to CGA and 100 % adherence to RLE NWB.  Short Term Goal 5: toileting tasks with use of grab bar/AD and BSC as needed to CGA.  Long Term Goals  Long Term Goal 1: Pt to stand with SBA and AD netta > 4 mins as able and with 100% adherence to RLE WB status to reduce falls in function.  Long Term Goal 2: Pt/caregivers to be I with pressure relief, edema mgt tech, EC/WS and fall prevention tech, DME/AE and AD recommendations with use of handouts.  Patient Goals   Patient goals : Pt states he wants to go home!      Therapy Time   Individual Concurrent Group Co-treatment   Time In 1220         Time Out 1304         Minutes 44=54 mins total          Timed Code Treatment Minutes: 39 Minutes       Stacie Reynoso, OT     
time, in no acute distress  HEENT: normocephalic and atraumatic, pupils equal, round, and reactive to light, Supple and non-tender without mass  Cardiovascular: normal rate, regular rhythm, normal S1 and S2, no murmurs, rubs, clicks, or gallops, distal pulses intact, no carotid bruits, no JVD  Pulmonary/Chest: clear to auscultation bilaterally- no wheezes, rales or rhonchi, normal air movement, no respiratory distress  Abdomen: soft, non-tender, non-distended, normal bowel sounds, no masses   Extremities: Right foot in surgical dressing  Neurological: alert, oriented, normal speech, no focal findings     Medications     Scheduled Meds:   cefepime  1,000 mg IntraVENous Q12H    lactobacillus  1 capsule Oral Daily with breakfast    metroNIDAZOLE  500 mg Oral 3 times per day    DAPTOmycin IV orderable  750 mg IntraVENous Q48H    amiodarone  200 mg Oral BID    sodium chloride flush  5-40 mL IntraVENous 2 times per day    DULoxetine  30 mg Oral Daily    hydrOXYzine HCl  50 mg Oral BID    metoprolol  100 mg Oral BID    [Held by provider] pravastatin  20 mg Oral Daily    sodium bicarbonate  650 mg Oral TID    traZODone  50 mg Oral Nightly    Vitamin D  1,000 Units Oral Daily    sodium chloride flush  5-40 mL IntraVENous 2 times per day    insulin lispro  0-8 Units SubCUTAneous 4x Daily AC & HS     Continuous Infusions:   Amiodarone Stopped (04/12/25 0001)    sodium chloride      sodium chloride      dextrose       PRN Meds:prochlorperazine, heparin (porcine) **AND** heparin (porcine), sodium chloride flush, sodium chloride, sodium chloride flush, sodium chloride, ondansetron **OR** ondansetron, polyethylene glycol, acetaminophen **OR** acetaminophen, glucose, dextrose bolus **OR** dextrose bolus, glucagon (rDNA), dextrose, zolpidem, albuterol sulfate HFA      .  Results Review      Labs Review:     Recent Labs     04/13/25  0610 04/14/25  0302 04/15/25  0332    140 136   K 4.2 4.0 4.5    104 102   CO2 25 22 23 
osseous abnormality identified.  Advanced degenerative change throughout the lumbar spine.     No acute airspace disease identified. No evidence for bowel obstruction or free air.  Moderate stool burden in the distal colon and rectum.           Medications:      amiodarone  200 mg Oral BID    sodium chloride flush  5-40 mL IntraVENous 2 times per day    DULoxetine  30 mg Oral Daily    hydrOXYzine HCl  50 mg Oral BID    metoprolol  100 mg Oral BID    pravastatin  20 mg Oral Daily    sodium bicarbonate  650 mg Oral TID    traZODone  50 mg Oral Nightly    Vitamin D  1,000 Units Oral Daily    sodium chloride flush  5-40 mL IntraVENous 2 times per day    insulin lispro  0-8 Units SubCUTAneous 4x Daily AC & HS    cefepime  1,000 mg IntraVENous Q12H    vancomycin (VANCOCIN) intermittent dosing (placeholder)   Other RX Placeholder         Past History:     Past Medical History:   Diagnosis Date    Acute blood loss anemia 01/05/2022    Arthritis     BILATERAL KNEESand right shoulder    Back pain     Cellulitis 07/14/2015    CKD (chronic kidney disease) stage 3, GFR 30-59 ml/min ([facility])     Dr. Barnes [facility] last visit 4/2024    Colostomy RUQ 09/23/2020    Frequent headaches     10/28/2020 PATIENT STATES EVERY OTHER DAY.    Gastrointestinal hemorrhage with melena 01/05/2022    Gout     Hemorrhagic shock ([facility]) 06/21/2023    Hernia of abdominal wall     Dr. Doty    History of atrial fibrillation     AFTER SURGERY ON 09/23/2020 WENT INTO A FIB. CONVERTED BACK TO NORMAL RHYTHM ON HIS OWN. CARDIOLOGIST DR STEIN    History of blood transfusion     NO REACTION    [place] (hard of hearing)     HTN     Hyperkalemia     Hyperlipidemia     Incisional hernia     Kidney infection     Large bowel obstruction ([facility]) 08/31/2020    Morbid obesity due to excess calories 10/04/2017    Paroxysmal A-fib ([facility]) 03/12/2024    Dr. C[facility] last visit 4/2024    SBO (small bowel obstruction) ([facility]) 04/17/2023    Sepsis 
results found for: \"LABCREA\"  Urine Eosinophils: No components found for: \"UEOS\"  Urine Protein:  No results found for: \"TPU\"      Urine:    Lab Results   Component Value Date/Time    PROTEINU 3+ 04/10/2025 02:37 PM         Serology:  FARIBA:   Lab Results   Component Value Date/Time    FARIBA NEGATIVE 09/28/2020 04:53 AM     C3:  Lab Results   Component Value Date/Time    C3 102 09/28/2020 04:53 AM     C4:  Lab Results   Component Value Date/Time    C4 20 09/28/2020 04:53 AM     MPO ANCA:  No results found for: \"MPO\"  PR3 ANCA:  No results found for: \"PR3\"  hepatitis serologies  ANTIGBM:No results found for: \"GBMABIGG\"  HEPATITIS B SURFACE AG:   Lab Results   Component Value Date/Time    HEPBSAG NONREACTIVE 04/11/2025 10:20 AM     HEPATITIS C AB:   Lab Results   Component Value Date/Time    HEPCAB NONREACTIVE 11/10/2020 09:15 AM       Electrophoresis:  SPEP:No results found for: \"ALBCAL\", \"ALBPCT\", \"LABALPH\", \"A1PCT\", \"A2PCT\", \"LABBETA\", \"BETAPCT\", \"GAMGLOB\", \"GGPCT\", \"PATH\"  UPEP:No results found for: \"LABPE\"         Thank you for the consultation. Please do not hesitate to contact us for any further questions/concerns. We will continue to follow along with you.        Electronically signed by Rajeev Crocker MD  on 4/13/2025 at 2:34 PM

## 2025-04-21 NOTE — PLAN OF CARE
Plan of Care Note  Foot and Ankle Surgery     Plan of Care      Patient off the floor for IR tunneled PICC line during rounds.  Patient stable for discharge from podiatry standpoint.  Patient to undergo wound VAC dressing change per wound ostomy today.  Patient to receive home health care with wound VAC dressing changes on M/W/F schedule. Patient will follow up with Dr. Del Valle outpatient within 7-10 days of discharge. Please [facility] resident on call with any questions.     Zulay Arango DPM  Foot and Ankle Surgery   04/18/25 at 1:46 PM     
  Problem: Discharge Planning  Goal: Discharge to home or other facility with appropriate resources  4/11/2025 1115 by Bhargavi Brito  Outcome: Progressing  4/11/2025 0233 by Lois Pérez RN  Outcome: Progressing     Problem: Pain  Goal: Verbalizes/displays adequate comfort level or baseline comfort level  4/11/2025 1115 by Bhargavi Brito  Outcome: Progressing  4/11/2025 0233 by Lois Pérez RN  Outcome: Progressing     Problem: Safety - Adult  Goal: Free from fall injury  4/11/2025 1115 by Bhargavi Brito  Outcome: Progressing  4/11/2025 0233 by Lois Pérez RN  Outcome: Progressing     Problem: Skin/Tissue Integrity  Goal: Skin integrity remains intact  Description: 1.  Monitor for areas of redness and/or skin breakdown  2.  Assess vascular access sites hourly  3.  Every 4-6 hours minimum:  Change oxygen saturation probe site  4.  Every 4-6 hours:  If on nasal continuous positive airway pressure, respiratory therapy assess nares and determine need for appliance change or resting period  4/11/2025 1115 by Bhargavi Brito  Outcome: Progressing  4/11/2025 0233 by Lois Pérez RN  Outcome: Progressing     Problem: Cardiovascular - Adult  Goal: Maintains optimal cardiac output and hemodynamic stability  4/11/2025 1115 by Bhargavi Brito  Outcome: Progressing  Flowsheets (Taken 4/11/2025 0800 by Cathie Davila, RN)  Maintains optimal cardiac output and hemodynamic stability:   Monitor blood pressure and heart rate   Monitor urine output and notify Licensed Independent Practitioner for values outside of normal range   Assess for signs of decreased cardiac output  4/11/2025 0233 by Lois Pérez RN  Outcome: Progressing  Goal: Absence of cardiac dysrhythmias or at baseline  4/11/2025 1115 by Bhargavi Brito  Outcome: Progressing  Flowsheets (Taken 4/11/2025 0800 by Cathie Davila, RN)  Absence of cardiac dysrhythmias or at baseline:   Monitor cardiac rate and rhythm   Assess for signs of 
  Problem: Discharge Planning  Goal: Discharge to home or other facility with appropriate resources  4/12/2025 1016 by Larry Means RN  Outcome: Progressing  4/12/2025 1016 by Larry Means RN  Outcome: Progressing     Problem: Pain  Goal: Verbalizes/displays adequate comfort level or baseline comfort level  4/12/2025 1016 by Larry Means RN  Outcome: Progressing  4/12/2025 1016 by Larry Means RN  Outcome: Progressing     Problem: Safety - Adult  Goal: Free from fall injury  4/12/2025 1016 by Larry Means RN  Outcome: Progressing  4/12/2025 1016 by Larry Means RN  Outcome: Progressing     Problem: Skin/Tissue Integrity  Goal: Skin integrity remains intact  Description: 1.  Monitor for areas of redness and/or skin breakdown  2.  Assess vascular access sites hourly  3.  Every 4-6 hours minimum:  Change oxygen saturation probe site  4.  Every 4-6 hours:  If on nasal continuous positive airway pressure, respiratory therapy assess nares and determine need for appliance change or resting period  4/12/2025 1016 by Larry Means RN  Outcome: Progressing  4/12/2025 1016 by Larry Means RN  Outcome: Progressing     Problem: Cardiovascular - Adult  Goal: Maintains optimal cardiac output and hemodynamic stability  4/12/2025 1016 by Larry Means RN  Outcome: Progressing  4/12/2025 1016 by Larry Means RN  Outcome: Progressing  Goal: Absence of cardiac dysrhythmias or at baseline  4/12/2025 1016 by Larry Means RN  Outcome: Progressing  4/12/2025 1016 by Larry Means RN  Outcome: Progressing     Problem: Musculoskeletal - Adult  Goal: Return mobility to safest level of function  4/12/2025 1016 by Larry Means RN  Outcome: Progressing  4/12/2025 1016 by Larry Means RN  Outcome: Progressing  Goal: Maintain proper alignment of affected body part  4/12/2025 1016 by Larry Means RN  Outcome: Progressing  4/12/2025 1016 by Larry Means RN  Outcome: Progressing  Goal: Return ADL status to 
  Problem: Discharge Planning  Goal: Discharge to home or other facility with appropriate resources  4/12/2025 1019 by Larry Means RN  Outcome: Progressing     Problem: Pain  Goal: Verbalizes/displays adequate comfort level or baseline comfort level  4/12/2025 1020 by Larry Means RN  Outcome: Progressing     Problem: Safety - Adult  Goal: Free from fall injury  4/12/2025 1020 by Larry Means RN  Outcome: Progressing     Problem: Skin/Tissue Integrity  Goal: Skin integrity remains intact  Description: 1.  Monitor for areas of redness and/or skin breakdown  2.  Assess vascular access sites hourly  3.  Every 4-6 hours minimum:  Change oxygen saturation probe site  4.  Every 4-6 hours:  If on nasal continuous positive airway pressure, respiratory therapy assess nares and determine need for appliance change or resting period  4/12/2025 1020 by Larry Means RN  Outcome: Progressing     Problem: Cardiovascular - Adult  Goal: Maintains optimal cardiac output and hemodynamic stability  4/12/2025 1020 by Larry Means RN  Outcome: Progressing     Problem: Cardiovascular - Adult  Goal: Absence of cardiac dysrhythmias or at baseline  4/12/2025 1020 by Larry Means RN  Outcome: Progressing     Problem: Musculoskeletal - Adult  Goal: Return mobility to safest level of function  4/12/2025 1020 by Larry Means RN  Outcome: Progressing     Problem: Musculoskeletal - Adult  Goal: Maintain proper alignment of affected body part  4/12/2025 1020 by Larry Means RN  Outcome: Progressing     Problem: Musculoskeletal - Adult  Goal: Return ADL status to a safe level of function  4/12/2025 1020 by Larry Means RN  Outcome: Progressing     Problem: Gastrointestinal - Adult  Goal: Minimal or absence of nausea and vomiting  4/12/2025 1020 by Larry Means RN  Outcome: Progressing     Problem: Gastrointestinal - Adult  Goal: Maintains adequate nutritional intake  4/12/2025 1020 by Larry Means RN  Outcome: 
  Problem: Discharge Planning  Goal: Discharge to home or other facility with appropriate resources  4/12/2025 1954 by Laurie Vasquez, RN  Outcome: Progressing  Flowsheets (Taken 4/11/2025 1914 by Lois Pérez, RN)  Discharge to home or other facility with appropriate resources:   Identify barriers to discharge with patient and caregiver   Arrange for needed discharge resources and transportation as appropriate   Identify discharge learning needs (meds, wound care, etc)   Arrange for interpreters to assist at discharge as needed   Refer to discharge planning if patient needs post-hospital services based on physician order or complex needs related to functional status, cognitive ability or social support system       Problem: Pain  Goal: Verbalizes/displays adequate comfort level or baseline comfort level  4/12/2025 1954 by Laurie Vaqsuez RN  Outcome: Progressing  Flowsheets (Taken 4/12/2025 1954)  Verbalizes/displays adequate comfort level or baseline comfort level:   Encourage patient to monitor pain and request assistance   Assess pain using appropriate pain scale   Administer analgesics based on type and severity of pain and evaluate response   Implement non-pharmacological measures as appropriate and evaluate response     Problem: Safety - Adult  Goal: Free from fall injury  4/12/2025 1954 by Laurie Vasquez, RN  Outcome: Progressing  Flowsheets (Taken 4/12/2025 1954)  Free From Fall Injury: Based on caregiver fall risk screen, instruct family/caregiver to ask for assistance with transferring infant if caregiver noted to have fall risk factors     Problem: Skin/Tissue Integrity  Goal: Skin integrity remains intact  Description: 1.  Monitor for areas of redness and/or skin breakdown  2.  Assess vascular access sites hourly  3.  Every 4-6 hours minimum:  Change oxygen saturation probe site  4.  Every 4-6 hours:  If on nasal continuous positive airway pressure, respiratory therapy assess nares and determine 
  Problem: Discharge Planning  Goal: Discharge to home or other facility with appropriate resources  4/14/2025 1234 by Vanessa Jaeger RN  Outcome: Progressing  4/14/2025 1234 by Vanessa Jaeger RN  Outcome: Progressing     Problem: Pain  Goal: Verbalizes/displays adequate comfort level or baseline comfort level  4/14/2025 1234 by Vanessa Jaeger RN  Outcome: Progressing  4/14/2025 1234 by Vanessa Jaeger RN  Outcome: Progressing     Problem: Safety - Adult  Goal: Free from fall injury  4/14/2025 1234 by Vanessa Jaeger RN  Outcome: Progressing  4/14/2025 1234 by Vanessa Jaeger RN  Outcome: Progressing     Problem: Skin/Tissue Integrity  Goal: Skin integrity remains intact  Description: 1.  Monitor for areas of redness and/or skin breakdown  2.  Assess vascular access sites hourly  3.  Every 4-6 hours minimum:  Change oxygen saturation probe site  4.  Every 4-6 hours:  If on nasal continuous positive airway pressure, respiratory therapy assess nares and determine need for appliance change or resting period  4/14/2025 1234 by Vanessa Jaeger RN  Outcome: Progressing  4/14/2025 1234 by Vanessa Jaeger RN  Outcome: Progressing     Problem: Cardiovascular - Adult  Goal: Maintains optimal cardiac output and hemodynamic stability  4/14/2025 1234 by Vanessa Jaeger RN  Outcome: Progressing  4/14/2025 1234 by Vanessa Jaeger RN  Outcome: Progressing  Goal: Absence of cardiac dysrhythmias or at baseline  4/14/2025 1234 by Vanessa Jaeger RN  Outcome: Progressing  4/14/2025 1234 by Vanessa Jaeger RN  Outcome: Progressing     Problem: Musculoskeletal - Adult  Goal: Return mobility to safest level of function  4/14/2025 1234 by Vanessa Jaeger RN  Outcome: Progressing  4/14/2025 1234 by Vanessa Jaeger RN  Outcome: Progressing  Goal: Maintain proper alignment of affected body part  4/14/2025 1234 by Vanessa Jaeger RN  Outcome: Progressing  4/14/2025 1234 by 
  Problem: Discharge Planning  Goal: Discharge to home or other facility with appropriate resources  4/14/2025 2327 by Stacie Awad RN  Outcome: Progressing  4/14/2025 2326 by Stacie Awad RN  Outcome: Progressing  4/14/2025 1234 by Vanessa Jaeger RN  Outcome: Progressing  4/14/2025 1234 by Vanessa Jaeger RN  Outcome: Progressing     Problem: Pain  Goal: Verbalizes/displays adequate comfort level or baseline comfort level  4/14/2025 2327 by Stacie Awad RN  Outcome: Progressing  4/14/2025 2326 by Stacie Awad RN  Outcome: Progressing  Flowsheets (Taken 4/14/2025 2000)  Verbalizes/displays adequate comfort level or baseline comfort level: Encourage patient to monitor pain and request assistance  4/14/2025 1234 by Vanessa Jaeger RN  Outcome: Progressing  4/14/2025 1234 by Vanessa Jaeger RN  Outcome: Progressing     Problem: Safety - Adult  Goal: Free from fall injury  4/14/2025 2327 by Stacie Awad RN  Outcome: Progressing  4/14/2025 2326 by Stacie Awad RN  Outcome: Progressing  4/14/2025 1234 by Vanessa Jaeger RN  Outcome: Progressing  4/14/2025 1234 by Vanessa Jaeger RN  Outcome: Progressing     Problem: Skin/Tissue Integrity  Goal: Skin integrity remains intact  Description: 1.  Monitor for areas of redness and/or skin breakdown  2.  Assess vascular access sites hourly  3.  Every 4-6 hours minimum:  Change oxygen saturation probe site  4.  Every 4-6 hours:  If on nasal continuous positive airway pressure, respiratory therapy assess nares and determine need for appliance change or resting period  4/14/2025 2327 by Stacie Awad RN  Outcome: Progressing  4/14/2025 2326 by Stacie Awad RN  Outcome: Progressing  Flowsheets (Taken 4/14/2025 2326)  Skin Integrity Remains Intact: Monitor for areas of redness and/or skin breakdown  4/14/2025 1234 by Vanessa Jaeger RN  Outcome: Progressing  4/14/2025 1234 by Vanessa Jaeger, RN  Outcome: 
  Problem: Discharge Planning  Goal: Discharge to home or other facility with appropriate resources  4/15/2025 1132 by Larry Means RN  Outcome: Progressing  4/14/2025 2327 by Stacie Awad RN  Outcome: Progressing  4/14/2025 2326 by Stacie Awad RN  Outcome: Progressing     Problem: Pain  Goal: Verbalizes/displays adequate comfort level or baseline comfort level  4/15/2025 1132 by Larry Means RN  Outcome: Progressing  Flowsheets (Taken 4/15/2025 0000 by Stacie Awad RN)  Verbalizes/displays adequate comfort level or baseline comfort level: Encourage patient to monitor pain and request assistance  4/14/2025 2327 by Stacie Awad RN  Outcome: Progressing  4/14/2025 2326 by Stacie Awad RN  Outcome: Progressing  Flowsheets (Taken 4/14/2025 2000)  Verbalizes/displays adequate comfort level or baseline comfort level: Encourage patient to monitor pain and request assistance     Problem: Safety - Adult  Goal: Free from fall injury  4/15/2025 1132 by Larry Means RN  Outcome: Progressing  4/14/2025 2327 by Stacie Awad RN  Outcome: Progressing  4/14/2025 2326 by Stacie Awad RN  Outcome: Progressing     Problem: Skin/Tissue Integrity  Goal: Skin integrity remains intact  Description: 1.  Monitor for areas of redness and/or skin breakdown  2.  Assess vascular access sites hourly  3.  Every 4-6 hours minimum:  Change oxygen saturation probe site  4.  Every 4-6 hours:  If on nasal continuous positive airway pressure, respiratory therapy assess nares and determine need for appliance change or resting period  4/15/2025 1132 by Larry Means RN  Outcome: Progressing  4/14/2025 2327 by Stacie Awad RN  Outcome: Progressing  4/14/2025 2326 by Stacie Awad RN  Outcome: Progressing  Flowsheets (Taken 4/14/2025 2326)  Skin Integrity Remains Intact: Monitor for areas of redness and/or skin breakdown     Problem: Cardiovascular - Adult  Goal: Maintains optimal cardiac output and 
  Problem: Discharge Planning  Goal: Discharge to home or other facility with appropriate resources  4/15/2025 2235 by Rylie Kumar  Outcome: Progressing  Flowsheets (Taken 4/15/2025 2147)  Discharge to home or other facility with appropriate resources:   Identify barriers to discharge with patient and caregiver   Arrange for needed discharge resources and transportation as appropriate   Identify discharge learning needs (meds, wound care, etc)   Refer to discharge planning if patient needs post-hospital services based on physician order or complex needs related to functional status, cognitive ability or social support system  4/15/2025 1132 by Larry Means, RN  Outcome: Progressing     Problem: Pain  Goal: Verbalizes/displays adequate comfort level or baseline comfort level  4/15/2025 2235 by Rylie Kumar  Outcome: Progressing  4/15/2025 1132 by Larry Means, RN  Outcome: Progressing  Flowsheets (Taken 4/15/2025 0000 by Stacie Awad, RN)  Verbalizes/displays adequate comfort level or baseline comfort level: Encourage patient to monitor pain and request assistance     Problem: Safety - Adult  Goal: Free from fall injury  4/15/2025 2235 by Rylie Kumar  Outcome: Progressing  Flowsheets (Taken 4/15/2025 2216)  Free From Fall Injury: Instruct family/caregiver on patient safety  4/15/2025 1132 by Larry Means, RN  Outcome: Progressing     Problem: Skin/Tissue Integrity  Goal: Skin integrity remains intact  Description: 1.  Monitor for areas of redness and/or skin breakdown  2.  Assess vascular access sites hourly  3.  Every 4-6 hours minimum:  Change oxygen saturation probe site  4.  Every 4-6 hours:  If on nasal continuous positive airway pressure, respiratory therapy assess nares and determine need for appliance change or resting period  4/15/2025 2235 by Rylie Kumar  Outcome: Progressing  Flowsheets  Taken 4/15/2025 2216  Skin Integrity Remains Intact: Monitor for areas of redness 
  Problem: Discharge Planning  Goal: Discharge to home or other facility with appropriate resources  4/17/2025 0054 by Nikki Lo RN  Outcome: Progressing  Flowsheets (Taken 4/16/2025 2000)  Discharge to home or other facility with appropriate resources:   Identify barriers to discharge with patient and caregiver   Arrange for needed discharge resources and transportation as appropriate   Identify discharge learning needs (meds, wound care, etc)     Problem: Pain  Goal: Verbalizes/displays adequate comfort level or baseline comfort level  4/17/2025 0054 by Nikki Lo RN  Outcome: Progressing     Problem: Safety - Adult  Goal: Free from fall injury  4/17/2025 0054 by Nikki Lo RN  Outcome: Progressing     Problem: Skin/Tissue Integrity  Goal: Skin integrity remains intact  Description: 1.  Monitor for areas of redness and/or skin breakdown  2.  Assess vascular access sites hourly  3.  Every 4-6 hours minimum:  Change oxygen saturation probe site  4.  Every 4-6 hours:  If on nasal continuous positive airway pressure, respiratory therapy assess nares and determine need for appliance change or resting period  4/17/2025 0054 by Nikki Lo RN  Outcome: Progressing  Flowsheets (Taken 4/16/2025 2000)  Skin Integrity Remains Intact:   Monitor for areas of redness and/or skin breakdown   Assess vascular access sites hourly     Problem: Cardiovascular - Adult  Goal: Maintains optimal cardiac output and hemodynamic stability  4/17/2025 0054 by Nikki Lo RN  Outcome: Progressing  Flowsheets (Taken 4/16/2025 2000)  Maintains optimal cardiac output and hemodynamic stability: Monitor blood pressure and heart rate     Problem: Cardiovascular - Adult  Goal: Absence of cardiac dysrhythmias or at baseline  4/17/2025 0054 by Nikki Lo RN  Outcome: Progressing     Problem: Musculoskeletal - Adult  Goal: Return mobility to safest level of function  4/17/2025 0054 by Nikki Lo RN  Outcome: Progressing  Flowsheets 
  Problem: Discharge Planning  Goal: Discharge to home or other facility with appropriate resources  Outcome: Progressing     Problem: Pain  Goal: Verbalizes/displays adequate comfort level or baseline comfort level  Outcome: Progressing     Problem: Safety - Adult  Goal: Free from fall injury  Outcome: Progressing     Problem: Skin/Tissue Integrity  Goal: Skin integrity remains intact  Description: 1.  Monitor for areas of redness and/or skin breakdown  2.  Assess vascular access sites hourly  3.  Every 4-6 hours minimum:  Change oxygen saturation probe site  4.  Every 4-6 hours:  If on nasal continuous positive airway pressure, respiratory therapy assess nares and determine need for appliance change or resting period  Outcome: Progressing     
  Problem: Discharge Planning  Goal: Discharge to home or other facility with appropriate resources  Outcome: Progressing     Problem: Pain  Goal: Verbalizes/displays adequate comfort level or baseline comfort level  Outcome: Progressing     Problem: Safety - Adult  Goal: Free from fall injury  Outcome: Progressing     Problem: Skin/Tissue Integrity  Goal: Skin integrity remains intact  Description: 1.  Monitor for areas of redness and/or skin breakdown  2.  Assess vascular access sites hourly  3.  Every 4-6 hours minimum:  Change oxygen saturation probe site  4.  Every 4-6 hours:  If on nasal continuous positive airway pressure, respiratory therapy assess nares and determine need for appliance change or resting period  Outcome: Progressing     Problem: Cardiovascular - Adult  Goal: Maintains optimal cardiac output and hemodynamic stability  Outcome: Progressing  Goal: Absence of cardiac dysrhythmias or at baseline  Outcome: Progressing     Problem: Musculoskeletal - Adult  Goal: Return mobility to safest level of function  Outcome: Progressing  Goal: Maintain proper alignment of affected body part  Outcome: Progressing  Goal: Return ADL status to a safe level of function  Outcome: Progressing     Problem: Gastrointestinal - Adult  Goal: Minimal or absence of nausea and vomiting  Outcome: Progressing  Goal: Maintains adequate nutritional intake  Outcome: Progressing     Problem: Genitourinary - Adult  Goal: Urinary catheter remains patent  Outcome: Progressing     Problem: Infection - Adult  Goal: Absence of infection at discharge  Outcome: Progressing  Goal: Absence of infection during hospitalization  Outcome: Progressing     Problem: Metabolic/Fluid and Electrolytes - Adult  Goal: Electrolytes maintained within normal limits  Outcome: Progressing  Goal: Hemodynamic stability and optimal renal function maintained  Outcome: Progressing  Goal: Glucose maintained within prescribed range  Outcome: Progressing     
No acute changes overnight, vitals stable and PRN percocet given once for pain in right foot. Patient satisfied.   Receiving rocephin and flagyl, will potentially need ABX at discharge. . Wound vac in place. ID and podiatry following  NSR- sinus adrian on monitor, PO amiodarone. Cardiology following.   Free from falls, all needs met at this time.    Problem: Discharge Planning  Goal: Discharge to home or other facility with appropriate resources  4/18/2025 0322 by Heidi Alonso RN  Outcome: Progressing     Problem: Pain  Goal: Verbalizes/displays adequate comfort level or baseline comfort level  4/18/2025 0322 by Heidi Alonso, RN  Outcome: Progressing  Patient having pain on their right foot and rates it a 4. Pain interventions includemedication. Patients goal for pain relief is 1. The need for pain and symptom management will be considered in the discharge planning process to ensure patients comfort.       Problem: Safety - Adult  Goal: Free from fall injury  4/18/2025 0322 by Heidi Alonso, RN  Outcome: Progressing     Problem: Infection - Adult  Goal: Absence of infection at discharge  4/18/2025 0322 by Heidi Alonso, RN  Outcome: Progressing     Problem: Metabolic/Fluid and Electrolytes - Adult  Goal: Hemodynamic stability and optimal renal function maintained  4/18/2025 0322 by Heidi Alonso, RN  Outcome: Progressing     
No acute events overnight  Patient A & O x4, stand and pivot to BSC, non weight bearing to right foot  Wound vac in place on right heel  Right CVC in place for IV Antibiotics to continue at home  ACHS blood sugar, no coverage this shift  Plan will be home with [facility] once medically stable for DC  Care ongoing          Problem: Discharge Planning  Goal: Discharge to home or other facility with appropriate resources  4/19/2025 0232 by Pepe Elias RN  Outcome: Progressing     Problem: Pain  Goal: Verbalizes/displays adequate comfort level or baseline comfort level  4/19/2025 0232 by Pepe Elias RN  Outcome: Progressing     Problem: Safety - Adult  Goal: Free from fall injury  4/19/2025 0232 by Pepe Elias RN  Outcome: Progressing     Problem: Skin/Tissue Integrity  Goal: Skin integrity remains intact  Description: 1.  Monitor for areas of redness and/or skin breakdown  4/19/2025 0232 by Pepe Elias RN  Outcome: Progressing  Flowsheets (Taken 4/18/2025 2142)  Skin Integrity Remains Intact: Monitor for areas of redness and/or skin breakdown     Problem: Cardiovascular - Adult  Goal: Maintains optimal cardiac output and hemodynamic stability  4/19/2025 0232 by Pepe Elias RN  Outcome: Progressing     Problem: Cardiovascular - Adult  Goal: Absence of cardiac dysrhythmias or at baseline  4/19/2025 0232 by Pepe Elias RN  Outcome: Progressing     Problem: Musculoskeletal - Adult  Goal: Return mobility to safest level of function  4/19/2025 0232 by Pepe Elias RN  Outcome: Progressing     Problem: Musculoskeletal - Adult  Goal: Maintain proper alignment of affected body part  Outcome: Progressing     Problem: Musculoskeletal - Adult  Goal: Return ADL status to a safe level of function  Outcome: Progressing     Problem: Gastrointestinal - Adult  Goal: Minimal or absence of nausea and vomiting  Outcome: Progressing     Problem: Gastrointestinal - Adult  Goal: Maintains adequate nutritional 
No acute events overnight  Vitals stable  Wound vac to right heel remains in place and functioning   No c.o pain this shift  Plan will be home with home care once wound vac can be delivered   Care ongoing      Problem: Musculoskeletal - Adult  Goal: Return mobility to safest level of function  4/20/2025 0205 by Pepe Elias, RN  Outcome: Progressing     Problem: Infection - Adult  Goal: Absence of infection at discharge  4/20/2025 0205 by Pepe Elias RN  Outcome: Progressing     Problem: Infection - Adult  Goal: Absence of infection during hospitalization  Outcome: Progressing     Problem: Infection - Adult  Goal: Absence of fever/infection during anticipated neutropenic period  Outcome: Progressing     Problem: Metabolic/Fluid and Electrolytes - Adult  Goal: Glucose maintained within prescribed range  4/20/2025 0205 by Pepe Elias RN  Outcome: Progressing     Problem: Hematologic - Adult  Goal: Maintains hematologic stability  4/20/2025 0205 by Pepe Elias, RN  Outcome: Progressing     
Patient A/O x4. On room air.  Wound vac in place to right foot. Changes to be MWF.   Pt ok to discharge once wound vac is delivered.  IV dapto and rocephin through May 18 per ID.  NWB to right foot.  PRN imodium given 1x.  No complaints of pain.  Safety maintained and call light within reach.    Problem: Musculoskeletal - Adult  Goal: Return mobility to safest level of function  4/21/2025 0418 by Rajan Singh RN  Outcome: Progressing     Problem: Infection - Adult  Goal: Absence of infection at discharge  4/21/2025 0418 by Rajan Singh RN  Outcome: Progressing     Problem: Metabolic/Fluid and Electrolytes - Adult  Goal: Glucose maintained within prescribed range  4/21/2025 0418 by Rajan Singh RN  Outcome: Progressing     Problem: Hematologic - Adult  Goal: Maintains hematologic stability  4/21/2025 0418 by Rajan Singh RN  Outcome: Progressing     
Pt remains safe and free from falls thus far in shift  IV antibiotics   NSR on cardiac monitor  percocet given for pain   Urinal output 500  Pt on RA  Discharge planning in progress, will go  once medically stable  Call light in reach  Bed locked and lowest position  Bed alarm on for safety  Care ongoing    Problem: Discharge Planning  Goal: Discharge to home or other facility with appropriate resources  Outcome: Progressing     Problem: Pain  Goal: Verbalizes/displays adequate comfort level or baseline comfort level  Outcome: Progressing   Patient having pain on their generalized and rates it a 5. Pain interventions includefrequent position changes, correct body alignment/body mechanics, relaxation techniques, medication, pillow support/positioning, diversional activities, regular rest periods, medicate per physician recommendation/order, medicate at the onset of pain before it interferes with regular functions , medicate before exercise/activity, and educate on the risk of over sedation vs. inadequate pain relief. Patients goal for pain relief is 3. The need for pain and symptom management will be considered in the discharge planning process to ensure patients comfort.    Problem: Safety - Adult  Goal: Free from fall injury  Outcome: Progressing     Problem: Skin/Tissue Integrity  Goal: Skin integrity remains intact  Description: 1.  Monitor for areas of redness and/or skin breakdown  2.  Assess vascular access sites hourly  3.  Every 4-6 hours minimum:  Change oxygen saturation probe site  4.  Every 4-6 hours:  If on nasal continuous positive airway pressure, respiratory therapy assess nares and determine need for appliance change or resting period  Outcome: Progressing     Problem: Cardiovascular - Adult  Goal: Maintains optimal cardiac output and hemodynamic stability  Outcome: Progressing     Problem: Cardiovascular - Adult  Goal: Absence of cardiac dysrhythmias or at baseline  Outcome: Progressing   
Pt remains safe and free from falls thus far in shift  IV antibiotics on discharge  Pt to have wound vac on d/c  NSR on cardiac monitor  percocet given for pain   Pt voiding  Pt on RA  Discharge planning in progress, will go home with home care once medically stable  Call light in reach  Bed locked and lowest position  Bed alarm on for safety  Care ongoing    Problem: Discharge Planning  Goal: Discharge to home or other facility with appropriate resources  Outcome: Progressing     Problem: Pain  Goal: Verbalizes/displays adequate comfort level or baseline comfort level  Outcome: Progressing     Problem: Safety - Adult  Goal: Free from fall injury  Outcome: Progressing     Problem: Skin/Tissue Integrity  Goal: Skin integrity remains intact  Description: 1.  Monitor for areas of redness and/or skin breakdown  2.  Assess vascular access sites hourly  3.  Every 4-6 hours minimum:  Change oxygen saturation probe site  4.  Every 4-6 hours:  If on nasal continuous positive airway pressure, respiratory therapy assess nares and determine need for appliance change or resting period  Outcome: Progressing     Problem: Cardiovascular - Adult  Goal: Maintains optimal cardiac output and hemodynamic stability  Outcome: Progressing     Problem: Cardiovascular - Adult  Goal: Absence of cardiac dysrhythmias or at baseline  Outcome: Progressing     Problem: Musculoskeletal - Adult  Goal: Return mobility to safest level of function  Outcome: Progressing     
Pt remains safe and free from falls thus far in shift  Transfer from CV  IV antibiotics   NSR on cardiac monitor  Has temp cath waiting on plan if continuing dialysis or not.    Percocet given for pain   Pt had wound vac placed on wound. Pt to have debridement ether Thursday or Friday   Pt on RA  Discharge planning in progress, will go once medically stable  Call light in reach  Bed locked and lowest position  Bed alarm on for safety  Care ongoing    Patient having pain on their rt heel and rates it a 7. Pain interventions includefrequent position changes, correct body alignment/body mechanics, relaxation techniques, medication, pillow support/positioning, diversional activities, regular rest periods, medicate per physician recommendation/order, medicate at the onset of pain before it interferes with regular functions , medicate before exercise/activity, and educate on the risk of over sedation vs. inadequate pain relief. Patients goal for pain relief is 3. The need for pain and symptom management will be considered in the discharge planning process to ensure patients comfort.    Problem: Discharge Planning  Goal: Discharge to home or other facility with appropriate resources  4/16/2025 1711 by Dick Pelaez, RN  Outcome: Progressing  4/16/2025 1143 by Delores Corona, RN  Outcome: Progressing  Flowsheets (Taken 4/16/2025 4237)  Discharge to home or other facility with appropriate resources:   Identify barriers to discharge with patient and caregiver   Arrange for needed discharge resources and transportation as appropriate   Identify discharge learning needs (meds, wound care, etc)   Refer to discharge planning if patient needs post-hospital services based on physician order or complex needs related to functional status, cognitive ability or social support system     Problem: Pain  Goal: Verbalizes/displays adequate comfort level or baseline comfort level  4/16/2025 1711 by Dick Pelaez, 
Wound vac on right foot. Will have to wait till Monday or Tuesday to be discharged as wound vac cannot be delivered till then   NWB moving good   Free from pain   Vitals  Pain -Patient having pain on their rtfoot and rates it a 5. Pain interventions includecorrect body alignment/body mechanics and medication. Patients goal for pain relief is 3. The need for pain and symptom management will be considered in the discharge planning process to ensure patients comfort.   Problem: Musculoskeletal - Adult  Goal: Return mobility to safest level of function  Outcome: Progressing     Problem: Infection - Adult  Goal: Absence of infection at discharge  Outcome: Progressing     Problem: Metabolic/Fluid and Electrolytes - Adult  Goal: Glucose maintained within prescribed range  Outcome: Progressing     Problem: Hematologic - Adult  Goal: Maintains hematologic stability  Outcome: Progressing     
Wound vac on right foot. Will have to wait till Monday or Tuesday to be discharged as wound vac cannot be delivered till then   NWB moving good   Imodium for frequent loose stools   Free from pain   Vitals    Problem: Musculoskeletal - Adult  Goal: Return mobility to safest level of function  4/20/2025 1508 by Jenny Thakur, RN  Outcome: Progressing     Problem: Infection - Adult  Goal: Absence of infection at discharge  4/20/2025 1508 by Jenny Thakur RN  Outcome: Progressing     Problem: Metabolic/Fluid and Electrolytes - Adult  Goal: Glucose maintained within prescribed range  4/20/2025 1508 by Jenny Thakur RN  Outcome: Progressing     Problem: Hematologic - Adult  Goal: Maintains hematologic stability  4/20/2025 1508 by Jenny Thakur RN  Outcome: Progressing     
hyperglycemia and hypoglycemia   Administer ordered medications to maintain glucose within target range     Problem: Respiratory - Adult  Goal: Achieves optimal ventilation and oxygenation  4/16/2025 1143 by Delores Corona RN  Outcome: Progressing  Flowsheets (Taken 4/16/2025 0793)  Achieves optimal ventilation and oxygenation:   Assess for changes in respiratory status   Assess for changes in mentation and behavior   Position to facilitate oxygenation and minimize respiratory effort   Oxygen supplementation based on oxygen saturation or arterial blood gases   Encourage broncho-pulmonary hygiene including cough, deep breathe, incentive spirometry   Assess and instruct to report shortness of breath or any respiratory difficulty   Assess the need for suctioning and aspirate as needed   Respiratory therapy support as indicated  4/15/2025 3973 by Rylie Kumar  Outcome: Progressing

## 2025-04-22 ENCOUNTER — TELEPHONE (OUTPATIENT)
Dept: FAMILY MEDICINE CLINIC | Age: 65
End: 2025-04-22

## 2025-04-22 NOTE — TELEPHONE ENCOUNTER
Hi Essie Zhang from [facility] is calling from [phone], she  have a question. Want to see if Pt can have his dressing days changed.    Looking to change to Monday,Wednesday and Friday instead of Tuesday, Thursday, and Sat.     Thank You.

## 2025-04-22 NOTE — TELEPHONE ENCOUNTER
Care Transitions Initial Follow Up Call    Outreach made within 2 business days of discharge: Yes    Patient: Prateek Denton II Patient : 1960   MRN: [id number]  Reason for Admission: nausea and vomiting  Discharge Date: 25       Spoke with: No one , left voicemail.     Discharge department/facility: [facility]     TCM Interactive Patient Contact:  Was patient able to fill all prescriptions: Yes  Was patient instructed to bring all medications to the follow-up visit: Patient did not answer  Is patient taking all medications as directed in the discharge summary? unknown  Does patient understand their discharge instructions: Yes  Does patient have questions or concerns that need addressed prior to 7-14 day follow up office visit: unknown    Additional needs identified to be addressed with provider  Left voicemail to call office back to schedule hospital follow up.             Scheduled appointment with PCP within 7-14 days    Follow Up  No future appointments.    Viviana Nixon MA

## 2025-04-30 ENCOUNTER — RESULTS FOLLOW-UP (OUTPATIENT)
Dept: FAMILY MEDICINE CLINIC | Age: 65
End: 2025-04-30

## 2025-05-01 ENCOUNTER — TELEPHONE (OUTPATIENT)
Dept: INFECTIOUS DISEASES | Age: 65
End: 2025-05-01

## 2025-05-01 DIAGNOSIS — M86.171 ACUTE OSTEOMYELITIS OF CALCANEUM, RIGHT (HCC): Primary | ICD-10-CM

## 2025-05-01 NOTE — TELEPHONE ENCOUNTER
Dimple with [facility] called and stated that she spoke with you yesterday since she never heard from office but there is no note in patients chart that anyone had spoke with them on High CK levels.  She stated that you wanted the patient to stop all current antibiotics and start on Teflaro.  She stated that it should be 200mg every 8 hours and we will need a new script and for you to document in this telephone encounter so I can send with script on the reasoning for patient to have every 8 hrs in order for insurance to cover it.  The script needs to be printed and signed so I can fax to [facility]

## 2025-05-01 NOTE — PROGRESS NOTES
I received a call from [facility] care pharmacist    The patient is receiving daptomycin and Rocephin intravenously for a right heel surgical wound dehiscence with calcaneal osteomyelitis and cultures grew out MRSA as well as E. Coli    Follow-up lab testing showed elevated CPK which is an adverse effect of daptomycin        The patient has chronic kidney disease stage IV and vancomycin has high risk to cause worsening kidney disease which would render the patient hemodialysis dependent    The antibiotic therapy will be switched to ceftaroline 200 mg every 8 hours and this will continue through the previously assigned date of May 18, 2025    Electronically signed by Dixon Freitas MD on 5/1/2025 at 12:38 PM

## 2025-05-01 NOTE — TELEPHONE ENCOUNTER
Faxed order to [facility] and spoke with Dimple and informed order was sent bust I have no current dictation as to why patient will need every 8 hours.  She is going to reach out to their pharmacist to see if she can give him a call and get med fixed

## 2025-05-05 NOTE — TELEPHONE ENCOUNTER
Ioana Mcdaniel routed conversation to Kai Arias Md ~ [facility] Clinical Support Pool2 hours ago (8:01 AM)     Ioana Mcdaniel routed conversation to Kai Arias Md ~ [facility] Clinical Support Pool2 hours ago (7:58 AM)     Carlton Ledesma Sr.  [facility] [address] (supporting Kai Arias MD)2 hours ago (7:12 AM)     DM  Hello,      Over the weekend, I heard a popping sound in my jaw and now I have unilateral swelling. There’s mild pain, 4/10. I was prescribed abx by the urgent care but didn’t take it. It seemed to be better and then got worse without trauma. I am wondering should I set an appointment with you or Dr Arias regarding this?       Jennifer Ledesma Sr.3 hours ago (7:04 AM)     EG  Good morning Carlton,      I would like to be schedule you an appointment with Dr. Arias but first I would a little more information. Please let us know the reason (Injury or Illness) that you would like to see Dr. Arias for and we can get started to schedule you an appointment.      Thank you,      ENT .        Carlton Ledesma Sr.  [facility] [address] (supporting Kai Arias MD)9 hours ago (12:29 AM)     DM  Appointment Request From: Carlton Ledesma Sr.     With Provider: Kai Arias MD [[facility] [address]]     Preferred Date Range: Any     Preferred Times: Any Time     Reason for visit: Specialist Follow-Up     Health Maintenance Topic:      Comments:        Please request docuemntation from cardio MD dr mahan for clearance for chart. Also can sign form for risk assessment  but he needs to make sure he has clearance also for nephology

## 2025-05-07 ENCOUNTER — TELEPHONE (OUTPATIENT)
Dept: INFECTIOUS DISEASES | Age: 65
End: 2025-05-07

## 2025-05-07 DIAGNOSIS — A09 DIARRHEA OF INFECTIOUS ORIGIN: Primary | ICD-10-CM

## 2025-05-07 NOTE — TELEPHONE ENCOUNTER
Per Dr. Freitas- He will order lab for C-diff and send in Zofran for nausea.  Orders placed    Please sign if accepted.  Thank you

## 2025-05-07 NOTE — TELEPHONE ENCOUNTER
Patient currently on Rocephin infusions until 5/18.  He reports the antibiotic is causing bad nausea and diarrhea.  His nurse who does infusions recommend he get a prescription for anti-nausea. Please advise.  Thank you

## 2025-05-08 ENCOUNTER — TELEPHONE (OUTPATIENT)
Dept: FAMILY MEDICINE CLINIC | Age: 65
End: 2025-05-08

## 2025-05-08 ENCOUNTER — OFFICE VISIT (OUTPATIENT)
Dept: FAMILY MEDICINE CLINIC | Age: 65
End: 2025-05-08
Payer: COMMERCIAL

## 2025-05-08 VITALS
DIASTOLIC BLOOD PRESSURE: 72 MMHG | SYSTOLIC BLOOD PRESSURE: 134 MMHG | WEIGHT: 258 LBS | HEART RATE: 77 BPM | TEMPERATURE: 98.6 F | BODY MASS INDEX: 36.12 KG/M2 | OXYGEN SATURATION: 95 % | HEIGHT: 71 IN

## 2025-05-08 DIAGNOSIS — E11.21 TYPE 2 DIABETES MELLITUS WITH DIABETIC NEPHROPATHY, WITH LONG-TERM CURRENT USE OF INSULIN (HCC): ICD-10-CM

## 2025-05-08 DIAGNOSIS — N18.5 STAGE 5 CHRONIC KIDNEY DISEASE NOT ON CHRONIC DIALYSIS (HCC): ICD-10-CM

## 2025-05-08 DIAGNOSIS — G47.00 INSOMNIA, UNSPECIFIED TYPE: Primary | ICD-10-CM

## 2025-05-08 DIAGNOSIS — Z93.3 COLOSTOMY IN PLACE (HCC): ICD-10-CM

## 2025-05-08 DIAGNOSIS — Z79.4 TYPE 2 DIABETES MELLITUS WITH DIABETIC NEPHROPATHY, WITH LONG-TERM CURRENT USE OF INSULIN (HCC): ICD-10-CM

## 2025-05-08 DIAGNOSIS — F51.02 ADJUSTMENT INSOMNIA: ICD-10-CM

## 2025-05-08 PROCEDURE — 1123F ACP DISCUSS/DSCN MKR DOCD: CPT | Performed by: NURSE PRACTITIONER

## 2025-05-08 PROCEDURE — 99214 OFFICE O/P EST MOD 30 MIN: CPT | Performed by: NURSE PRACTITIONER

## 2025-05-08 PROCEDURE — 3075F SYST BP GE 130 - 139MM HG: CPT | Performed by: NURSE PRACTITIONER

## 2025-05-08 PROCEDURE — 1159F MED LIST DOCD IN RCRD: CPT | Performed by: NURSE PRACTITIONER

## 2025-05-08 PROCEDURE — 1160F RVW MEDS BY RX/DR IN RCRD: CPT | Performed by: NURSE PRACTITIONER

## 2025-05-08 PROCEDURE — 3078F DIAST BP <80 MM HG: CPT | Performed by: NURSE PRACTITIONER

## 2025-05-08 RX ORDER — METOPROLOL TARTRATE 100 MG/1
100 TABLET ORAL 2 TIMES DAILY
Qty: 60 TABLET | Refills: 5 | Status: SHIPPED | OUTPATIENT
Start: 2025-05-08

## 2025-05-08 RX ORDER — TRAZODONE HYDROCHLORIDE 50 MG/1
50 TABLET ORAL NIGHTLY
Qty: 90 TABLET | Refills: 1 | Status: SHIPPED | OUTPATIENT
Start: 2025-05-08

## 2025-05-08 RX ORDER — HYDROXYZINE HYDROCHLORIDE 50 MG/1
50 TABLET, FILM COATED ORAL NIGHTLY
Qty: 90 TABLET | Refills: 3 | Status: SHIPPED | OUTPATIENT
Start: 2025-05-08

## 2025-05-08 RX ORDER — AMLODIPINE BESYLATE 10 MG/1
10 TABLET ORAL DAILY
Qty: 90 TABLET | Refills: 3 | Status: SHIPPED | OUTPATIENT
Start: 2025-05-08

## 2025-05-08 RX ORDER — DULOXETIN HYDROCHLORIDE 30 MG/1
30 CAPSULE, DELAYED RELEASE ORAL DAILY
Qty: 90 CAPSULE | Refills: 2 | Status: SHIPPED | OUTPATIENT
Start: 2025-05-08 | End: 2025-08-06

## 2025-05-08 ASSESSMENT — PATIENT HEALTH QUESTIONNAIRE - PHQ9
1. LITTLE INTEREST OR PLEASURE IN DOING THINGS: NOT AT ALL
SUM OF ALL RESPONSES TO PHQ QUESTIONS 1-9: 0
2. FEELING DOWN, DEPRESSED OR HOPELESS: NOT AT ALL
SUM OF ALL RESPONSES TO PHQ QUESTIONS 1-9: 0

## 2025-05-08 NOTE — PROGRESS NOTES
[facility]  [place] [address]  [phone]      Prateek Denton II is a 65 y.o. male who presents today for his  medicalconditions/complaints as noted below.  Prateek Denton II is c/o of Discuss Labs and Medication Problem (Pravastatin, refill hydroxyzine.  IV abx was changed and told to be off pravastatin initially, wasn't told to restart with new abx)  .    HPI:   HPI  64 yo male here today for hospital f/u. He was admitted from 4-10-25 through 4-21-25 for bone infection to right heel and also atrial flutter. States he is home now with wife, received home care 3 days per week. He continues with wound vac and cam walker boot with continued improvement with wound site and more mobility. He is in stg  CKD and had two dialysis tx's in hospital but this has not been continued after d/c. He has not seen his nephrologist since hospital d/c and will call for appt. He is urinating clear yellow urine often. He continues on IV abx x 11 more days. He is eating/drinking normally. He would like new orders for hgba1c and needs meds refills. He is doing well with a diabetic diet.   Past Medical History:   Diagnosis Date    Acute blood loss anemia 01/05/2022    Arthritis     BILATERAL KNEESand right shoulder    Back pain     Cellulitis 07/14/2015    CKD (chronic kidney disease) stage 3, GFR 30-59 ml/min ([facility])     Dr. [facility] last visit 4/2024    Colostomy RUQ 09/23/2020    Frequent headaches     10/28/2020 PATIENT STATES EVERY OTHER DAY.    Gastrointestinal hemorrhage with melena 01/05/2022    Gout     Hemorrhagic shock (HCC) 06/21/2023    Hernia of abdominal wall     Dr. Doty    History of atrial fibrillation     AFTER SURGERY ON 09/23/2020 WENT INTO A FIB. CONVERTED BACK TO NORMAL RHYTHM ON HIS OWN. CARDIOLOGIST DR STEIN    History of blood transfusion     NO REACTION    [place] (hard of hearing)     HTN     Hyperkalemia     Hyperlipidemia     Incisional hernia

## 2025-05-08 NOTE — TELEPHONE ENCOUNTER
PT would like a RX to get a handicap placard.    Would like to pick it up at the office    Please call [place] when ready - [phone]    Please advise

## 2025-05-09 ENCOUNTER — HOSPITAL ENCOUNTER (OUTPATIENT)
Age: 65
Setting detail: SPECIMEN
Discharge: HOME | End: 2025-05-09

## 2025-05-09 DIAGNOSIS — A09 DIARRHEA OF INFECTIOUS ORIGIN: ICD-10-CM

## 2025-05-09 RX ORDER — ONDANSETRON 4 MG/1
4 TABLET, FILM COATED ORAL DAILY PRN
Qty: 30 TABLET | Refills: 0 | Status: SHIPPED | OUTPATIENT
Start: 2025-05-09

## 2025-05-09 ASSESSMENT — ENCOUNTER SYMPTOMS
SHORTNESS OF BREATH: 0
NAUSEA: 0
ABDOMINAL PAIN: 0
COUGH: 0
CHEST TIGHTNESS: 0
VOMITING: 0

## 2025-05-10 LAB
C DIFF GDH + TOXINS A+B STL QL IA.RAPID: NEGATIVE
SPECIMEN DESCRIPTION: NORMAL

## 2025-05-12 LAB
ESTIMATED AVERAGE GLUCOSE: 114
HBA1C MFR BLD: 5.6 %

## 2025-05-13 ENCOUNTER — RESULTS FOLLOW-UP (OUTPATIENT)
Dept: FAMILY MEDICINE CLINIC | Age: 65
End: 2025-05-13

## 2025-05-13 DIAGNOSIS — Z79.4 TYPE 2 DIABETES MELLITUS WITH DIABETIC NEPHROPATHY, WITH LONG-TERM CURRENT USE OF INSULIN (HCC): ICD-10-CM

## 2025-05-13 DIAGNOSIS — E11.21 TYPE 2 DIABETES MELLITUS WITH DIABETIC NEPHROPATHY, WITH LONG-TERM CURRENT USE OF INSULIN (HCC): ICD-10-CM

## 2025-05-21 ENCOUNTER — OFFICE VISIT (OUTPATIENT)
Dept: INFECTIOUS DISEASES | Age: 65
End: 2025-05-21
Payer: COMMERCIAL

## 2025-05-21 VITALS
TEMPERATURE: 98.1 F | BODY MASS INDEX: 37.94 KG/M2 | DIASTOLIC BLOOD PRESSURE: 76 MMHG | SYSTOLIC BLOOD PRESSURE: 132 MMHG | HEIGHT: 70 IN | WEIGHT: 265 LBS | HEART RATE: 69 BPM | OXYGEN SATURATION: 100 %

## 2025-05-21 DIAGNOSIS — A49.02 MRSA INFECTION (METHICILLIN-RESISTANT STAPHYLOCOCCUS AUREUS): ICD-10-CM

## 2025-05-21 DIAGNOSIS — L97.411 DIABETIC ULCER OF RIGHT HEEL ASSOCIATED WITH DIABETES MELLITUS DUE TO UNDERLYING CONDITION, LIMITED TO BREAKDOWN OF SKIN (HCC): Primary | ICD-10-CM

## 2025-05-21 DIAGNOSIS — M86.171 ACUTE OSTEOMYELITIS OF CALCANEUM, RIGHT (HCC): ICD-10-CM

## 2025-05-21 DIAGNOSIS — A49.8 E COLI INFECTION: ICD-10-CM

## 2025-05-21 DIAGNOSIS — T14.8XXA SURGICAL WOUND PRESENT: ICD-10-CM

## 2025-05-21 DIAGNOSIS — E08.621 DIABETIC ULCER OF RIGHT HEEL ASSOCIATED WITH DIABETES MELLITUS DUE TO UNDERLYING CONDITION, LIMITED TO BREAKDOWN OF SKIN (HCC): Primary | ICD-10-CM

## 2025-05-21 PROCEDURE — 99214 OFFICE O/P EST MOD 30 MIN: CPT | Performed by: INTERNAL MEDICINE

## 2025-05-21 PROCEDURE — 3075F SYST BP GE 130 - 139MM HG: CPT | Performed by: INTERNAL MEDICINE

## 2025-05-21 PROCEDURE — 3078F DIAST BP <80 MM HG: CPT | Performed by: INTERNAL MEDICINE

## 2025-05-21 PROCEDURE — 1159F MED LIST DOCD IN RCRD: CPT | Performed by: INTERNAL MEDICINE

## 2025-05-21 PROCEDURE — 1123F ACP DISCUSS/DSCN MKR DOCD: CPT | Performed by: INTERNAL MEDICINE

## 2025-05-21 ASSESSMENT — ENCOUNTER SYMPTOMS
RESPIRATORY NEGATIVE: 1
GASTROINTESTINAL NEGATIVE: 1

## 2025-05-21 NOTE — PROGRESS NOTES
[facility]  Office Progress Note  Today's Date and Time: 5/21/2025, 11:10 AM    Impression:     1. 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 Acute osteomyelitis of calcaneum, right (HCC)    3. Surgical wound present    4. MRSA infection (methicillin-resistant Staphylococcus aureus)    5. E coli infection         Recommendations     Assessment & Plan  1. Right heel surgical wound infection with calcaneal osteomyelitis due to E. coli and MRSA.  He completed his antibiotic treatment with Teflaro on 05/18/2025. The wound appears healthy and is showing good progress with granulation tissue. A skin graft is scheduled for 06/06/2025. The wound VAC will be continued post-skin graft to ensure proper healing. If any signs of infection reappear, he should contact the clinic immediately.    2. Chronic kidney disease stage 4.  He has been informed that his kidney function is at 13%, necessitating dialysis. He will undergo vein mapping on 06/02/2025 to determine the best vein for dialysis access. He is scheduled to start dialysis on 06/02/2025 or 06/03/2025.    3. Rhabdomyolysis.  He experienced rhabdomyolysis due to daptomycin, which was resolved after discontinuing the medication. His CPK levels have returned to normal. Future antibiotic treatments will need to consider this history to avoid recurrence.    PROCEDURE  The patient had a right heel surgery in the past.       I have ordered the following medications/ labs:  No orders of the defined types were placed in this encounter.     No orders of the defined types were placed in this encounter.      Chief complaint/reason for consultation:     Chief Complaint   Patient presents with    Wound Infection     Hospital follow up         History of Present Illness:   Prateek Denton II is a 65 y.o.-year-old male who comes in for f/u of calcaneal OM    Prateek has diabetes

## 2025-06-04 ENCOUNTER — HOSPITAL ENCOUNTER (OUTPATIENT)
Age: 65
Setting detail: OUTPATIENT SURGERY
Discharge: HOME | End: 2025-06-04
Attending: PODIATRIST | Admitting: PODIATRIST
Payer: COMMERCIAL

## 2025-06-04 VITALS
TEMPERATURE: 97.3 F | BODY MASS INDEX: 35.84 KG/M2 | DIASTOLIC BLOOD PRESSURE: 84 MMHG | OXYGEN SATURATION: 100 % | RESPIRATION RATE: 17 BRPM | HEART RATE: 63 BPM | HEIGHT: 71 IN | SYSTOLIC BLOOD PRESSURE: 132 MMHG | WEIGHT: 256 LBS

## 2025-06-04 PROCEDURE — 3600000012 HC SURGERY LEVEL 2 ADDTL 15MIN: Performed by: PODIATRIST

## 2025-06-04 PROCEDURE — 2709999900 HC NON-CHARGEABLE SUPPLY: Performed by: PODIATRIST

## 2025-06-04 PROCEDURE — 3600000002 HC SURGERY LEVEL 2 BASE: Performed by: PODIATRIST

## 2025-06-04 PROCEDURE — 6360000002 HC RX W HCPCS: Performed by: PODIATRIST

## 2025-06-04 PROCEDURE — 7100000011 HC PHASE II RECOVERY - ADDTL 15 MIN: Performed by: PODIATRIST

## 2025-06-04 PROCEDURE — 7100000010 HC PHASE II RECOVERY - FIRST 15 MIN: Performed by: PODIATRIST

## 2025-06-04 DEVICE — IMPLANTABLE DEVICE: Type: IMPLANTABLE DEVICE | Site: ANKLE | Status: FUNCTIONAL

## 2025-06-04 RX ORDER — BUPIVACAINE HYDROCHLORIDE 5 MG/ML
INJECTION, SOLUTION EPIDURAL; INTRACAUDAL; PERINEURAL PRN
Status: DISCONTINUED | OUTPATIENT
Start: 2025-06-04 | End: 2025-06-04 | Stop reason: ALTCHOICE

## 2025-06-04 RX ORDER — LIDOCAINE HYDROCHLORIDE 10 MG/ML
INJECTION, SOLUTION EPIDURAL; INFILTRATION; INTRACAUDAL; PERINEURAL PRN
Status: DISCONTINUED | OUTPATIENT
Start: 2025-06-04 | End: 2025-06-04 | Stop reason: ALTCHOICE

## 2025-06-04 ASSESSMENT — PAIN - FUNCTIONAL ASSESSMENT
PAIN_FUNCTIONAL_ASSESSMENT: 0-10
PAIN_FUNCTIONAL_ASSESSMENT: FACE, LEGS, ACTIVITY, CRY, AND CONSOLABILITY (FLACC)

## 2025-06-04 ASSESSMENT — ENCOUNTER SYMPTOMS
RESPIRATORY NEGATIVE: 1
GASTROINTESTINAL NEGATIVE: 1

## 2025-06-04 NOTE — H&P
05/19/2025 12:01 PM    BUN 48 04/21/2025 09:46 AM    CREATININE 5.26 05/19/2025 12:01 PM    CREATININE 4.8 04/21/2025 09:46 AM    MG 2.0 01/26/2025 06:41 AM    MG 1.5 01/25/2025 07:06 AM       Hepatic Function Panel:   Lab Results   Component Value Date/Time    LABALBU 100 10/09/2024 05:04 PM    LABALBU 100 07/01/2024 03:49 PM    BILIDIR 0.1 04/12/2025 05:02 PM    BILIDIR 0.1 03/22/2024 03:10 PM    IBILI 0.1 04/12/2025 05:02 PM    IBILI 0.2 03/22/2024 03:10 PM    BILITOT 0.2 05/19/2025 12:01 PM    BILITOT 0.2 04/12/2025 05:02 PM    ALKPHOS 94 05/19/2025 12:01 PM    ALKPHOS 69 04/12/2025 05:02 PM    ALT 44 05/19/2025 12:01 PM    ALT 20 04/12/2025 05:02 PM    AST 30 05/19/2025 12:01 PM    AST 21 04/12/2025 05:02 PM       Lab Results   Component Value Date/Time    CRP 5.1 04/21/2025 05:33 AM    CRP 5.0 04/20/2025 05:19 AM     Lab Results   Component Value Date/Time    SEDRATE 26 04/21/2025 05:33 AM    SEDRATE 41 04/20/2025 05:19 AM         Imaging Studies:   No new imaging    Cultures:   Culture and Sensitivities:  No results for input(s): \"SPECDESC\", \"SPECIAL\", \"CULTURE\", \"STATUS\", \"ORG\", \"CDIFFTOXPCR\", \"CAMPYLOBPCR\", \"SALMONELLAPC\", \"SHIGAPCR\", \"SHIGELLAPCR\" in the last 72 hours.      Thank you for allowing us to participate in the care of this patient. Please call with questions.    Electronically signed by TORREY ELI - CNP on 6/4/2025 at 12:03 PM      Dixon Freitas MD  Perfect Serve messaging: [phone]    The patient (or guardian, if applicable) and other individuals in attendance with the patient were advised that Artificial Intelligence will be utilized during this visit to record, process the conversation to generate a clinical note, and support improvement of the AI technology. The patient (or guardian, if applicable) and other individuals in attendance at the appointment consented to the use of AI, including the recording.        This note is created with the assistance of a speech

## 2025-06-04 NOTE — DISCHARGE INSTRUCTIONS
Keep wound vac in place as ordered.  Call for follow-up and for dressing change.  Wathc for signs of infection; redness, drainage, fever. Be sure to make a follow-up appointment.  Elevate foot when possible.

## 2025-06-04 NOTE — OP NOTE
Operative Note      Patient: Prateek Denton II  YOB: 1960  MRN: [id number]    Date of Procedure: 6/4/2025    Pre-Op Diagnosis Codes:  Full-thickness chronic ulceration, exposed subcutaneous tissue, right leg    Post-Op Diagnosis: Same       Procedures:  Surgical preparation of wound bed, 14 cm², right leg  Application of skin graft substitute, right leg  Application of wound VAC, right leg    Surgeon(s):  Trey Del Valle DPM    Assistant:   Surgical Assistant: Pepe Samaniego    Anesthesia: Local    Estimated Blood Loss (mL): Minimal    Complications: None    Specimens:   * No specimens in log *    Implants:  Implant Name Type Inv. Item Serial No.  Lot No. LRB No. Used Action   THERASKIN 1.6X2.6IN CRYOPRESERCED SKIN     [id number] Right 1 Implanted         Drains:   Negative Pressure Wound Therapy Heel Right (Active)       Findings:  Infection Present At Time Of Surgery (PATOS) (choose all levels that have infection present):  No infection present  Other Findings: Healthy appearing granular wound bed.  Theraskin skin graft substitute was applied    Detailed Description of Procedure:   INDICATIONS FOR PROCEDURE:  Patient is a 65 year-old male well known to Dr. Del Valle with a chief complaint of  chronic nonhealing wound to the posterior right leg.  Patient has been undergoing negative pressure wound therapy and the wound bed has had significant granulation tissue formation.  The wound bed is fibro-granular and is now at a point where graft application is necessary to augment further healing of the wound.  The patient has elected to undergo surgical treatment.  In pre-op all risks and rewards of the aforementioned procedure were discussed at length prior to the patient signing the consent form which was witnessed by a nurse and placed in his chart. The right leg was marked, labs and images reviewed, NPO status confirmed. No guarantees were given or implied.      PROCEDURE IN DETAIL:

## 2025-06-16 ENCOUNTER — HOSPITAL ENCOUNTER (INPATIENT)
Age: 65
LOS: 3 days | Discharge: HOME | DRG: 640 | End: 2025-06-19
Attending: EMERGENCY MEDICINE | Admitting: STUDENT IN AN ORGANIZED HEALTH CARE EDUCATION/TRAINING PROGRAM
Payer: MEDICARE

## 2025-06-16 DIAGNOSIS — E87.5 HYPERKALEMIA: Primary | ICD-10-CM

## 2025-06-16 DIAGNOSIS — N18.6 END-STAGE RENAL DISEASE NEEDING DIALYSIS (HCC): ICD-10-CM

## 2025-06-16 DIAGNOSIS — Z99.2 END-STAGE RENAL DISEASE NEEDING DIALYSIS (HCC): ICD-10-CM

## 2025-06-16 LAB
ANION GAP SERPL CALCULATED.3IONS-SCNC: 13 MMOL/L (ref 9–16)
BASOPHILS # BLD: 0.05 K/UL (ref 0–0.2)
BASOPHILS NFR BLD: 1 % (ref 0–2)
BILIRUB UR QL STRIP: NEGATIVE
BUN SERPL-MCNC: 81 MG/DL (ref 8–23)
CALCIUM SERPL-MCNC: 8.9 MG/DL (ref 8.8–10.2)
CHLORIDE SERPL-SCNC: 106 MMOL/L (ref 98–107)
CHP ED QC CHECK: YES
CLARITY UR: CLEAR
CO2 SERPL-SCNC: 19 MMOL/L (ref 20–31)
COLOR UR: YELLOW
CREAT SERPL-MCNC: 5.1 MG/DL (ref 0.7–1.2)
EOSINOPHIL # BLD: 0.45 K/UL (ref 0–0.44)
EOSINOPHILS RELATIVE PERCENT: 5 % (ref 1–4)
EPI CELLS #/AREA URNS HPF: ABNORMAL /HPF (ref 0–5)
ERYTHROCYTE [DISTWIDTH] IN BLOOD BY AUTOMATED COUNT: 15 % (ref 11.8–14.4)
GFR, ESTIMATED: 12 ML/MIN/1.73M2
GLUCOSE BLD-MCNC: 100 MG/DL
GLUCOSE BLD-MCNC: 100 MG/DL (ref 75–110)
GLUCOSE BLD-MCNC: 114 MG/DL
GLUCOSE BLD-MCNC: 114 MG/DL (ref 75–110)
GLUCOSE BLD-MCNC: 65 MG/DL
GLUCOSE BLD-MCNC: 65 MG/DL (ref 75–110)
GLUCOSE SERPL-MCNC: 122 MG/DL (ref 82–115)
GLUCOSE UR STRIP-MCNC: ABNORMAL MG/DL
HCT VFR BLD AUTO: 31.9 % (ref 40.7–50.3)
HGB BLD-MCNC: 10.8 G/DL (ref 13–17)
HGB UR QL STRIP.AUTO: ABNORMAL
IMM GRANULOCYTES # BLD AUTO: 0.07 K/UL (ref 0–0.3)
IMM GRANULOCYTES NFR BLD: 1 %
KETONES UR STRIP-MCNC: NEGATIVE MG/DL
LEUKOCYTE ESTERASE UR QL STRIP: NEGATIVE
LYMPHOCYTES NFR BLD: 1.85 K/UL (ref 1.1–3.7)
LYMPHOCYTES RELATIVE PERCENT: 21 % (ref 24–43)
MAGNESIUM SERPL-MCNC: 1.6 MG/DL (ref 1.6–2.4)
MCH RBC QN AUTO: 29.3 PG (ref 25.2–33.5)
MCHC RBC AUTO-ENTMCNC: 33.9 G/DL (ref 28.4–34.8)
MCV RBC AUTO: 86.4 FL (ref 82.6–102.9)
MONOCYTES NFR BLD: 0.91 K/UL (ref 0.1–1.2)
MONOCYTES NFR BLD: 10 % (ref 3–12)
NEUTROPHILS NFR BLD: 62 % (ref 36–65)
NEUTS SEG NFR BLD: 5.7 K/UL (ref 1.5–8.1)
NITRITE UR QL STRIP: NEGATIVE
NRBC BLD-RTO: 0 PER 100 WBC
PH UR STRIP: 6.5 [PH] (ref 5–8)
PHOSPHATE SERPL-MCNC: 4.2 MG/DL (ref 2.5–4.5)
PLATELET # BLD AUTO: 217 K/UL (ref 138–453)
PMV BLD AUTO: 10 FL (ref 8.1–13.5)
POTASSIUM SERPL-SCNC: 5.7 MMOL/L (ref 3.7–5.3)
PROT UR STRIP-MCNC: ABNORMAL MG/DL
RBC # BLD AUTO: 3.69 M/UL (ref 4.21–5.77)
RBC # BLD: ABNORMAL 10*6/UL
RBC #/AREA URNS HPF: ABNORMAL /HPF (ref 0–2)
SODIUM SERPL-SCNC: 137 MMOL/L (ref 136–145)
SP GR UR STRIP: 1.02 (ref 1–1.03)
UROBILINOGEN UR STRIP-ACNC: NORMAL EU/DL (ref 0–1)
WBC #/AREA URNS HPF: ABNORMAL /HPF (ref 0–5)
WBC OTHER # BLD: 9 K/UL (ref 3.5–11.3)

## 2025-06-16 PROCEDURE — 84100 ASSAY OF PHOSPHORUS: CPT

## 2025-06-16 PROCEDURE — 80048 BASIC METABOLIC PNL TOTAL CA: CPT

## 2025-06-16 PROCEDURE — 81001 URINALYSIS AUTO W/SCOPE: CPT

## 2025-06-16 PROCEDURE — 83735 ASSAY OF MAGNESIUM: CPT

## 2025-06-16 PROCEDURE — 99285 EMERGENCY DEPT VISIT HI MDM: CPT

## 2025-06-16 PROCEDURE — 6370000000 HC RX 637 (ALT 250 FOR IP): Performed by: STUDENT IN AN ORGANIZED HEALTH CARE EDUCATION/TRAINING PROGRAM

## 2025-06-16 PROCEDURE — 1200000000 HC SEMI PRIVATE

## 2025-06-16 PROCEDURE — 82947 ASSAY GLUCOSE BLOOD QUANT: CPT

## 2025-06-16 PROCEDURE — 85025 COMPLETE CBC W/AUTO DIFF WBC: CPT

## 2025-06-16 PROCEDURE — 99222 1ST HOSP IP/OBS MODERATE 55: CPT | Performed by: STUDENT IN AN ORGANIZED HEALTH CARE EDUCATION/TRAINING PROGRAM

## 2025-06-16 PROCEDURE — 2500000003 HC RX 250 WO HCPCS: Performed by: NURSE PRACTITIONER

## 2025-06-16 PROCEDURE — 93005 ELECTROCARDIOGRAM TRACING: CPT | Performed by: NURSE PRACTITIONER

## 2025-06-16 PROCEDURE — 6370000000 HC RX 637 (ALT 250 FOR IP): Performed by: NURSE PRACTITIONER

## 2025-06-16 RX ORDER — SODIUM CHLORIDE 0.9 % (FLUSH) 0.9 %
5-40 SYRINGE (ML) INJECTION EVERY 12 HOURS SCHEDULED
Status: DISCONTINUED | OUTPATIENT
Start: 2025-06-16 | End: 2025-06-19 | Stop reason: HOSPADM

## 2025-06-16 RX ORDER — ONDANSETRON 4 MG/1
4 TABLET, ORALLY DISINTEGRATING ORAL EVERY 8 HOURS PRN
Status: DISCONTINUED | OUTPATIENT
Start: 2025-06-16 | End: 2025-06-19 | Stop reason: HOSPADM

## 2025-06-16 RX ORDER — SODIUM CHLOR/HYPOCHLOROUS ACID 0.033 %
1 SOLUTION, IRRIGATION IRRIGATION PRN
Status: DISCONTINUED | OUTPATIENT
Start: 2025-06-16 | End: 2025-06-19 | Stop reason: HOSPADM

## 2025-06-16 RX ORDER — SODIUM BICARBONATE 650 MG/1
650 TABLET ORAL 2 TIMES DAILY
Status: DISCONTINUED | OUTPATIENT
Start: 2025-06-16 | End: 2025-06-17

## 2025-06-16 RX ORDER — ALBUTEROL SULFATE 90 UG/1
2 INHALANT RESPIRATORY (INHALATION) 4 TIMES DAILY PRN
Status: DISCONTINUED | OUTPATIENT
Start: 2025-06-16 | End: 2025-06-19 | Stop reason: HOSPADM

## 2025-06-16 RX ORDER — SODIUM CHLORIDE 9 MG/ML
INJECTION, SOLUTION INTRAVENOUS PRN
Status: DISCONTINUED | OUTPATIENT
Start: 2025-06-16 | End: 2025-06-19 | Stop reason: HOSPADM

## 2025-06-16 RX ORDER — FLUTICASONE PROPIONATE 50 MCG
2 SPRAY, SUSPENSION (ML) NASAL DAILY PRN
Status: DISCONTINUED | OUTPATIENT
Start: 2025-06-16 | End: 2025-06-19 | Stop reason: HOSPADM

## 2025-06-16 RX ORDER — DULOXETIN HYDROCHLORIDE 30 MG/1
30 CAPSULE, DELAYED RELEASE ORAL DAILY
Status: DISCONTINUED | OUTPATIENT
Start: 2025-06-17 | End: 2025-06-19 | Stop reason: HOSPADM

## 2025-06-16 RX ORDER — ONDANSETRON 2 MG/ML
4 INJECTION INTRAMUSCULAR; INTRAVENOUS EVERY 6 HOURS PRN
Status: DISCONTINUED | OUTPATIENT
Start: 2025-06-16 | End: 2025-06-19 | Stop reason: HOSPADM

## 2025-06-16 RX ORDER — POLYETHYLENE GLYCOL 3350 17 G/17G
17 POWDER, FOR SOLUTION ORAL DAILY PRN
Status: DISCONTINUED | OUTPATIENT
Start: 2025-06-16 | End: 2025-06-19 | Stop reason: HOSPADM

## 2025-06-16 RX ORDER — DEXTROSE MONOHYDRATE 25 G/50ML
25 INJECTION, SOLUTION INTRAVENOUS ONCE
Status: COMPLETED | OUTPATIENT
Start: 2025-06-16 | End: 2025-06-16

## 2025-06-16 RX ORDER — VITAMIN B COMPLEX
1000 TABLET ORAL DAILY
Status: DISCONTINUED | OUTPATIENT
Start: 2025-06-16 | End: 2025-06-19 | Stop reason: HOSPADM

## 2025-06-16 RX ORDER — AMLODIPINE BESYLATE 10 MG/1
10 TABLET ORAL DAILY
Status: DISCONTINUED | OUTPATIENT
Start: 2025-06-17 | End: 2025-06-19 | Stop reason: HOSPADM

## 2025-06-16 RX ORDER — METOPROLOL TARTRATE 100 MG/1
100 TABLET ORAL 2 TIMES DAILY
Status: DISCONTINUED | OUTPATIENT
Start: 2025-06-16 | End: 2025-06-19 | Stop reason: HOSPADM

## 2025-06-16 RX ORDER — LACTOBACILLUS RHAMNOSUS GG 10B CELL
1 CAPSULE ORAL
Status: DISCONTINUED | OUTPATIENT
Start: 2025-06-17 | End: 2025-06-19 | Stop reason: HOSPADM

## 2025-06-16 RX ORDER — ONDANSETRON 4 MG/1
4 TABLET, FILM COATED ORAL DAILY PRN
Status: DISCONTINUED | OUTPATIENT
Start: 2025-06-16 | End: 2025-06-16 | Stop reason: SDUPTHER

## 2025-06-16 RX ORDER — SODIUM CHLORIDE 0.9 % (FLUSH) 0.9 %
5-40 SYRINGE (ML) INJECTION PRN
Status: DISCONTINUED | OUTPATIENT
Start: 2025-06-16 | End: 2025-06-19 | Stop reason: HOSPADM

## 2025-06-16 RX ORDER — ACETAMINOPHEN 650 MG/1
650 SUPPOSITORY RECTAL EVERY 6 HOURS PRN
Status: DISCONTINUED | OUTPATIENT
Start: 2025-06-16 | End: 2025-06-19 | Stop reason: HOSPADM

## 2025-06-16 RX ORDER — ENOXAPARIN SODIUM 100 MG/ML
30 INJECTION SUBCUTANEOUS DAILY
Status: DISCONTINUED | OUTPATIENT
Start: 2025-06-17 | End: 2025-06-17

## 2025-06-16 RX ORDER — HYDROXYZINE HYDROCHLORIDE 25 MG/1
50 TABLET, FILM COATED ORAL NIGHTLY
Status: DISCONTINUED | OUTPATIENT
Start: 2025-06-16 | End: 2025-06-19 | Stop reason: HOSPADM

## 2025-06-16 RX ORDER — TRAZODONE HYDROCHLORIDE 50 MG/1
50 TABLET ORAL NIGHTLY
Status: DISCONTINUED | OUTPATIENT
Start: 2025-06-16 | End: 2025-06-19 | Stop reason: HOSPADM

## 2025-06-16 RX ORDER — ACETAMINOPHEN 325 MG/1
650 TABLET ORAL EVERY 6 HOURS PRN
Status: DISCONTINUED | OUTPATIENT
Start: 2025-06-16 | End: 2025-06-19 | Stop reason: HOSPADM

## 2025-06-16 RX ADMIN — DEXTROSE MONOHYDRATE 25 G: 25 INJECTION, SOLUTION INTRAVENOUS at 20:58

## 2025-06-16 RX ADMIN — INSULIN HUMAN 10 UNITS: 100 INJECTION, SOLUTION PARENTERAL at 20:54

## 2025-06-16 RX ADMIN — SODIUM ZIRCONIUM CYCLOSILICATE 5 G: 5 POWDER, FOR SUSPENSION ORAL at 21:41

## 2025-06-16 RX ADMIN — SODIUM ZIRCONIUM CYCLOSILICATE 10 G: 10 POWDER, FOR SUSPENSION ORAL at 20:58

## 2025-06-16 ASSESSMENT — ENCOUNTER SYMPTOMS
ABDOMINAL PAIN: 0
DIARRHEA: 0
NAUSEA: 0
BACK PAIN: 0
CONSTIPATION: 0
SHORTNESS OF BREATH: 0
VOMITING: 0

## 2025-06-16 ASSESSMENT — PAIN - FUNCTIONAL ASSESSMENT: PAIN_FUNCTIONAL_ASSESSMENT: NONE - DENIES PAIN

## 2025-06-16 NOTE — ED NOTES
Patient arrived to ED with c/o abnormal lab, he was sent in by Dr. Joya. Patient was at dialysis today and was to have a dialysis catheter placed. He did not have catheter placed due to elevated potassium as well as additional unknown abnormal labs. Patient denies chest pain, SOB. His respirations are equal and non-labored with no use of accessory muscles.     Patient currently has an ankle boot on his right ankle that he has been wearing since January. In January he had an infection in his right heel and great toe which was amputated, at that time his heeling process had no complications. In April he had bone spurs removed and was placed on a wound vac for this. His wound vac was removed today.     Denies use of assistive devices and is independent at home.

## 2025-06-16 NOTE — ED PROVIDER NOTES
Team [facility] EMERGENCY DEPARTMENT  eMERGENCY dEPARTMENT eNCOUnter      Pt Name: Prateek Denton II  MRN: [id number]  Birthdate 1960  Date of evaluation: 6/16/2025  Provider: TORREY Salcedo CNP    CHIEF COMPLAINT     No chief complaint on file.        HISTORY OF PRESENT ILLNESS  (Location/Symptom, Timing/Onset, Context/Setting, Quality, Duration, Modifying Factors, Severity.)   Prateek Denton II is a 65 y.o. male who presents to the emergency department. Pt reports his potassium was elevated with his lab draw earlier today. Reports is was >6.  States he was scheduled to have a dialysis catheter placed today, but it was not done due to the elevated potassium level. He was advised to come to the ED by his nephrologist Dr. Duffy. Pt took 10 g Lokelma around 1430 today. Denies fever, chills, HA, dizziness, vision changes. Denies CP, SOB, palpitations. Denies N/V/D, constipation. Denies pain.      Nursing Notes were reviewed.    ALLERGIES     Ampicillin, Pcn [penicillins], Sulfa antibiotics, and Tape [adhesive tape]    CURRENT MEDICATIONS       Previous Medications    ALBUTEROL SULFATE HFA (PROVENTIL;VENTOLIN;PROAIR) 108 (90 BASE) MCG/ACT INHALER    INHALE 2 PUFFS INTO THE LUNGS 4 TIMES DAILY AS NEEDED FOR WHEEZING OR SHORTNESS OF BREATH.    AMLODIPINE (NORVASC) 10 MG TABLET    Take 1 tablet by mouth daily    BLOOD GLUCOSE MONITORING SUPPL ([employer] BLOOD GLUCOSE METER) W/DEVICE KIT    1 each by Does not apply route in the morning and at bedtime    CONTINUOUS GLUCOSE SENSOR (FREESTYLE RANJIT 3 PLUS SENSOR) MISC    Wear continuous for glucose monitoring    CPAP MACHINE MISC    use as directed    DULOXETINE (CYMBALTA) 30 MG EXTENDED RELEASE CAPSULE    Take 1 capsule by mouth daily    FLUTICASONE (FLONASE) 50 MCG/ACT NASAL SPRAY    2 sprays by Nasal route daily as needed    HYDROXYZINE HCL (ATARAX) 50 MG TABLET    Take 1 tablet by mouth nightly    LACTOBACILLUS (CULTURELLE) CAPSULE    Take 1 capsule

## 2025-06-17 ENCOUNTER — APPOINTMENT (OUTPATIENT)
Dept: GENERAL RADIOLOGY | Age: 65
DRG: 640 | End: 2025-06-17
Payer: MEDICARE

## 2025-06-17 ENCOUNTER — APPOINTMENT (OUTPATIENT)
Dept: INTERVENTIONAL RADIOLOGY/VASCULAR | Age: 65
DRG: 640 | End: 2025-06-17
Payer: MEDICARE

## 2025-06-17 LAB
ANION GAP SERPL CALCULATED.3IONS-SCNC: 12 MMOL/L (ref 9–16)
ANION GAP SERPL CALCULATED.3IONS-SCNC: 13 MMOL/L (ref 9–16)
BASOPHILS # BLD: 0.05 K/UL (ref 0–0.2)
BASOPHILS NFR BLD: 1 % (ref 0–2)
BUN SERPL-MCNC: 75 MG/DL (ref 8–23)
BUN SERPL-MCNC: 80 MG/DL (ref 8–23)
CALCIUM SERPL-MCNC: 8.4 MG/DL (ref 8.8–10.2)
CALCIUM SERPL-MCNC: 8.5 MG/DL (ref 8.8–10.2)
CHLORIDE SERPL-SCNC: 108 MMOL/L (ref 98–107)
CHLORIDE SERPL-SCNC: 108 MMOL/L (ref 98–107)
CO2 SERPL-SCNC: 16 MMOL/L (ref 20–31)
CO2 SERPL-SCNC: 16 MMOL/L (ref 20–31)
CREAT SERPL-MCNC: 5.1 MG/DL (ref 0.7–1.2)
CREAT SERPL-MCNC: 5.2 MG/DL (ref 0.7–1.2)
EKG ATRIAL RATE: 73 BPM
EKG P AXIS: 46 DEGREES
EKG P-R INTERVAL: 178 MS
EKG Q-T INTERVAL: 392 MS
EKG QRS DURATION: 82 MS
EKG QTC CALCULATION (BAZETT): 431 MS
EKG R AXIS: 23 DEGREES
EKG T AXIS: 39 DEGREES
EKG VENTRICULAR RATE: 73 BPM
EOSINOPHIL # BLD: 0.41 K/UL (ref 0–0.44)
EOSINOPHILS RELATIVE PERCENT: 4 % (ref 1–4)
ERYTHROCYTE [DISTWIDTH] IN BLOOD BY AUTOMATED COUNT: 14.7 % (ref 11.8–14.4)
GFR, ESTIMATED: 11 ML/MIN/1.73M2
GFR, ESTIMATED: 12 ML/MIN/1.73M2
GLUCOSE BLD-MCNC: 129 MG/DL (ref 75–110)
GLUCOSE BLD-MCNC: 70 MG/DL (ref 75–110)
GLUCOSE SERPL-MCNC: 110 MG/DL (ref 82–115)
GLUCOSE SERPL-MCNC: 87 MG/DL (ref 82–115)
HBV CORE AB SER QL: NONREACTIVE
HBV SURFACE AB SERPL IA-ACNC: <3.5 MIU/ML
HBV SURFACE AG SERPL QL IA: NONREACTIVE
HCT VFR BLD AUTO: 30.2 % (ref 40.7–50.3)
HGB BLD-MCNC: 10.2 G/DL (ref 13–17)
IMM GRANULOCYTES # BLD AUTO: 0.06 K/UL (ref 0–0.3)
IMM GRANULOCYTES NFR BLD: 1 %
INR PPP: 1.1
LACTATE BLDV-SCNC: 1 MMOL/L (ref 0.5–2.2)
LYMPHOCYTES NFR BLD: 2.11 K/UL (ref 1.1–3.7)
LYMPHOCYTES RELATIVE PERCENT: 20 % (ref 24–43)
MCH RBC QN AUTO: 29.2 PG (ref 25.2–33.5)
MCHC RBC AUTO-ENTMCNC: 33.8 G/DL (ref 28.4–34.8)
MCV RBC AUTO: 86.5 FL (ref 82.6–102.9)
MONOCYTES NFR BLD: 0.89 K/UL (ref 0.1–1.2)
MONOCYTES NFR BLD: 9 % (ref 3–12)
NEUTROPHILS NFR BLD: 65 % (ref 36–65)
NEUTS SEG NFR BLD: 6.92 K/UL (ref 1.5–8.1)
NRBC BLD-RTO: 0 PER 100 WBC
PHOSPHATE SERPL-MCNC: 4.3 MG/DL (ref 2.5–4.5)
PLATELET # BLD AUTO: 196 K/UL (ref 138–453)
PMV BLD AUTO: 10.5 FL (ref 8.1–13.5)
POTASSIUM SERPL-SCNC: 5.8 MMOL/L (ref 3.7–5.3)
POTASSIUM SERPL-SCNC: 6 MMOL/L (ref 3.7–5.3)
PROTHROMBIN TIME: 14.2 SEC (ref 11.5–14.2)
RBC # BLD AUTO: 3.49 M/UL (ref 4.21–5.77)
RBC # BLD: ABNORMAL 10*6/UL
SODIUM SERPL-SCNC: 136 MMOL/L (ref 136–145)
SODIUM SERPL-SCNC: 136 MMOL/L (ref 136–145)
WBC OTHER # BLD: 10.4 K/UL (ref 3.5–11.3)

## 2025-06-17 PROCEDURE — 71045 X-RAY EXAM CHEST 1 VIEW: CPT

## 2025-06-17 PROCEDURE — C1894 INTRO/SHEATH, NON-LASER: HCPCS

## 2025-06-17 PROCEDURE — 76937 US GUIDE VASCULAR ACCESS: CPT

## 2025-06-17 PROCEDURE — 1200000000 HC SEMI PRIVATE

## 2025-06-17 PROCEDURE — G0499 HEPB SCREEN HIGH RISK INDIV: HCPCS

## 2025-06-17 PROCEDURE — 5A1D70Z PERFORMANCE OF URINARY FILTRATION, INTERMITTENT, LESS THAN 6 HOURS PER DAY: ICD-10-PCS | Performed by: INTERNAL MEDICINE

## 2025-06-17 PROCEDURE — 80048 BASIC METABOLIC PNL TOTAL CA: CPT

## 2025-06-17 PROCEDURE — 85610 PROTHROMBIN TIME: CPT

## 2025-06-17 PROCEDURE — 2500000003 HC RX 250 WO HCPCS: Performed by: STUDENT IN AN ORGANIZED HEALTH CARE EDUCATION/TRAINING PROGRAM

## 2025-06-17 PROCEDURE — 6370000000 HC RX 637 (ALT 250 FOR IP): Performed by: INTERNAL MEDICINE

## 2025-06-17 PROCEDURE — 83605 ASSAY OF LACTIC ACID: CPT

## 2025-06-17 PROCEDURE — 6360000002 HC RX W HCPCS: Performed by: SPECIALIST

## 2025-06-17 PROCEDURE — 90935 HEMODIALYSIS ONE EVALUATION: CPT

## 2025-06-17 PROCEDURE — 6360000002 HC RX W HCPCS: Performed by: RADIOLOGY

## 2025-06-17 PROCEDURE — 85025 COMPLETE CBC W/AUTO DIFF WBC: CPT

## 2025-06-17 PROCEDURE — 0JH63XZ INSERTION OF TUNNELED VASCULAR ACCESS DEVICE INTO CHEST SUBCUTANEOUS TISSUE AND FASCIA, PERCUTANEOUS APPROACH: ICD-10-PCS | Performed by: INTERNAL MEDICINE

## 2025-06-17 PROCEDURE — 93010 ELECTROCARDIOGRAM REPORT: CPT | Performed by: INTERNAL MEDICINE

## 2025-06-17 PROCEDURE — 36415 COLL VENOUS BLD VENIPUNCTURE: CPT

## 2025-06-17 PROCEDURE — 02HV33Z INSERTION OF INFUSION DEVICE INTO SUPERIOR VENA CAVA, PERCUTANEOUS APPROACH: ICD-10-PCS | Performed by: INTERNAL MEDICINE

## 2025-06-17 PROCEDURE — 99232 SBSQ HOSP IP/OBS MODERATE 35: CPT | Performed by: INTERNAL MEDICINE

## 2025-06-17 PROCEDURE — 82947 ASSAY GLUCOSE BLOOD QUANT: CPT

## 2025-06-17 PROCEDURE — 77001 FLUOROGUIDE FOR VEIN DEVICE: CPT

## 2025-06-17 PROCEDURE — 36558 INSERT TUNNELED CV CATH: CPT

## 2025-06-17 PROCEDURE — 2580000003 HC RX 258: Performed by: INTERNAL MEDICINE

## 2025-06-17 PROCEDURE — 6370000000 HC RX 637 (ALT 250 FOR IP): Performed by: STUDENT IN AN ORGANIZED HEALTH CARE EDUCATION/TRAINING PROGRAM

## 2025-06-17 PROCEDURE — B5181ZZ FLUOROSCOPY OF SUPERIOR VENA CAVA USING LOW OSMOLAR CONTRAST: ICD-10-PCS | Performed by: INTERNAL MEDICINE

## 2025-06-17 PROCEDURE — 84100 ASSAY OF PHOSPHORUS: CPT

## 2025-06-17 PROCEDURE — 2500000003 HC RX 250 WO HCPCS: Performed by: RADIOLOGY

## 2025-06-17 RX ORDER — HEPARIN SODIUM 1000 [USP'U]/ML
1900 INJECTION, SOLUTION INTRAVENOUS; SUBCUTANEOUS PRN
Status: DISCONTINUED | OUTPATIENT
Start: 2025-06-17 | End: 2025-06-18

## 2025-06-17 RX ORDER — HEPARIN SODIUM 5000 [USP'U]/ML
5000 INJECTION, SOLUTION INTRAVENOUS; SUBCUTANEOUS EVERY 8 HOURS SCHEDULED
Status: DISCONTINUED | OUTPATIENT
Start: 2025-06-17 | End: 2025-06-19 | Stop reason: HOSPADM

## 2025-06-17 RX ORDER — FENTANYL CITRATE 0.05 MG/ML
INJECTION, SOLUTION INTRAMUSCULAR; INTRAVENOUS PRN
Status: COMPLETED | OUTPATIENT
Start: 2025-06-17 | End: 2025-06-17

## 2025-06-17 RX ORDER — DEXTROSE MONOHYDRATE 100 MG/ML
INJECTION, SOLUTION INTRAVENOUS CONTINUOUS PRN
Status: DISCONTINUED | OUTPATIENT
Start: 2025-06-17 | End: 2025-06-19 | Stop reason: HOSPADM

## 2025-06-17 RX ORDER — HEPARIN SODIUM 1000 [USP'U]/ML
INJECTION, SOLUTION INTRAVENOUS; SUBCUTANEOUS PRN
Status: COMPLETED | OUTPATIENT
Start: 2025-06-17 | End: 2025-06-17

## 2025-06-17 RX ORDER — SODIUM BICARBONATE 650 MG/1
1300 TABLET ORAL 3 TIMES DAILY
Status: DISCONTINUED | OUTPATIENT
Start: 2025-06-17 | End: 2025-06-18

## 2025-06-17 RX ADMIN — Medication 1 CAPSULE: at 08:23

## 2025-06-17 RX ADMIN — SODIUM CHLORIDE, PRESERVATIVE FREE 10 ML: 5 INJECTION INTRAVENOUS at 08:23

## 2025-06-17 RX ADMIN — Medication 1000 UNITS: at 21:33

## 2025-06-17 RX ADMIN — WATER 3000 MG: 1 INJECTION INTRAMUSCULAR; INTRAVENOUS; SUBCUTANEOUS at 14:54

## 2025-06-17 RX ADMIN — HEPARIN SODIUM 1900 UNITS: 1000 INJECTION, SOLUTION INTRAVENOUS; SUBCUTANEOUS at 18:03

## 2025-06-17 RX ADMIN — HEPARIN SODIUM 1900 UNITS: 1000 INJECTION INTRAVENOUS; SUBCUTANEOUS at 15:14

## 2025-06-17 RX ADMIN — HUMAN INSULIN 10 UNITS: 100 INJECTION, SOLUTION SUBCUTANEOUS at 08:19

## 2025-06-17 RX ADMIN — DEXTROSE MONOHYDRATE 250 ML: 100 INJECTION, SOLUTION INTRAVENOUS at 08:18

## 2025-06-17 RX ADMIN — SODIUM ZIRCONIUM CYCLOSILICATE 5 G: 5 POWDER, FOR SUSPENSION ORAL at 08:23

## 2025-06-17 RX ADMIN — SODIUM BICARBONATE 650 MG: 650 TABLET ORAL at 08:22

## 2025-06-17 RX ADMIN — TRAZODONE HYDROCHLORIDE 50 MG: 50 TABLET ORAL at 00:03

## 2025-06-17 RX ADMIN — METOPROLOL 100 MG: 100 TABLET ORAL at 19:51

## 2025-06-17 RX ADMIN — SODIUM CHLORIDE, PRESERVATIVE FREE 10 ML: 5 INJECTION INTRAVENOUS at 19:53

## 2025-06-17 RX ADMIN — HYDROXYZINE HYDROCHLORIDE 50 MG: 25 TABLET, FILM COATED ORAL at 19:50

## 2025-06-17 RX ADMIN — SODIUM BICARBONATE 1300 MG: 650 TABLET ORAL at 19:50

## 2025-06-17 RX ADMIN — AMLODIPINE BESYLATE 10 MG: 10 TABLET ORAL at 08:22

## 2025-06-17 RX ADMIN — TRAZODONE HYDROCHLORIDE 50 MG: 50 TABLET ORAL at 21:33

## 2025-06-17 RX ADMIN — HEPARIN SODIUM 1900 UNITS: 1000 INJECTION, SOLUTION INTRAVENOUS; SUBCUTANEOUS at 17:59

## 2025-06-17 RX ADMIN — HEPARIN SODIUM 1900 UNITS: 1000 INJECTION INTRAVENOUS; SUBCUTANEOUS at 15:13

## 2025-06-17 RX ADMIN — HYDROXYZINE HYDROCHLORIDE 50 MG: 25 TABLET, FILM COATED ORAL at 00:03

## 2025-06-17 RX ADMIN — METOPROLOL 100 MG: 100 TABLET ORAL at 08:22

## 2025-06-17 RX ADMIN — FENTANYL CITRATE 50 MCG: 0.05 INJECTION, SOLUTION INTRAMUSCULAR; INTRAVENOUS at 14:58

## 2025-06-17 RX ADMIN — SODIUM POLYSTYRENE SULFONATE 45 G: 15 SUSPENSION ORAL at 13:14

## 2025-06-17 RX ADMIN — SODIUM BICARBONATE 1300 MG: 650 TABLET ORAL at 13:03

## 2025-06-17 RX ADMIN — DULOXETINE 30 MG: 30 CAPSULE, DELAYED RELEASE ORAL at 08:22

## 2025-06-17 ASSESSMENT — PAIN SCALES - GENERAL
PAINLEVEL_OUTOF10: 0

## 2025-06-17 NOTE — CARE COORDINATION
Case Management Assessment  Initial Evaluation    Date/Time of Evaluation: 6/17/2025 9:09 AM  Assessment Completed by: Kiara Mac    If patient is discharged prior to next notation, then this note serves as note for discharge by case management.    Patient Name: Prateek Denton II                   YOB: 1960  Diagnosis: Hyperkalemia [E87.5]  End-stage renal disease needing dialysis (HCC) [N18.6, Z99.2]                   Date / Time: 6/16/2025  7:21 PM    Patient Admission Status: Inpatient   Readmission Risk (Low < 19, Mod (19-27), High > 27): Readmission Risk Score: 22.5    Current PCP: Lisseth Coello, TORREY - CNP  PCP verified by CM? (P) Yes    Chart Reviewed: Yes      History Provided by: (P) Patient  Patient Orientation: (P) Alert and Oriented    Patient Cognition: (P) Alert    Hospitalization in the last 30 days (Readmission):  No    If yes, Readmission Assessment in CM Navigator will be completed.    Advance Directives:      Code Status: Full Code   Patient's Primary Decision Maker is: (P) Legal Next of Kin    Primary Decision Maker: Valencia Denton - Spouse - [phone]    Discharge Planning:    Patient lives with: (P) Spouse/Significant Other Type of Home: (P) House  Primary Care Giver: (P) Self  Patient Support Systems include: (P) Spouse/Significant Other   Current Financial resources: (P) Medicare  Current community resources:    Current services prior to admission: (P) C-pap, Durable Medical Equipment            Current DME: (P) Wheelchair, Other (Comment) (knee scooter)            Type of Home Care services:  (P) Nursing Services    ADLS  Prior functional level: (P) Independent in ADLs/IADLs  Current functional level: (P) Independent in ADLs/IADLs    PT AM-PAC:   /24  OT AM-PAC:   /24    Family can provide assistance at DC: (P) Yes  Would you like Case Management to discuss the discharge plan with any other family members/significant others, and if so, who? (P) No  Plans to Return to

## 2025-06-17 NOTE — ED NOTES
ED to inpatient nurses report     Admitting Diagnosis: Hyperkalemia. End-stage renal disease  No chief complaint on file.     Present to ED from Home  LOC: alert and orientated to name, place, date  Patient confused: NO  Vital signs   Vitals:    06/16/25 2049 06/16/25 2100 06/16/25 2115 06/16/25 2130   BP: (!) 148/91 (!) 160/81 (!) 146/75 (!) 158/84   Pulse: 70 69 75 76   Resp: 15 12 17 16   Temp:       SpO2: 100% 100% 100% 100%   Weight:       Height:          Oxygen Baseline Room air    Current needs required NONE   SEPSIS: NO  [] Lactate X 2 ordered (Yes or No)  [] Antibiotics given (Yes or No)  [] IV Fluids ordered (Yes or No)             [] 2nd IV completed (Yes or No)  [] Hourly Vital Signs (Validated)  [] Outstanding Orders:     LDAs:   Peripheral IV 06/16/25 Left Antecubital (Active)   Site Assessment Clean, dry & intact 06/16/25 2053   Line Status Blood return noted;Specimen collected;Brisk blood return;Flushed 06/16/25 2053   Phlebitis Assessment No symptoms 06/16/25 2053   Infiltration Assessment 0 06/16/25 2053   Alcohol Cap Used Yes 06/16/25 2053   Dressing Status New dressing applied;Clean, dry & intact 06/16/25 2053   Dressing Type Transparent 06/16/25 2053   Dressing Intervention New 06/16/25 2053     Mobility: Independent  Fall Risk: [place] 1 Fall Risk  Presents to emergency department  because of falls (Syncope, seizure, or loss of consciousness): No (06/16/25 1805)  Age > 70: No (06/16/25 1805)  Altered Mental Status, Intoxication with alcohol or substance confusion (Disorientation, impaired judgment, poor safety awaremess, or inability to follow instructions): No (06/16/25 1805)  Impaired Mobility: Ambulates or transfers with assistive devices or assistance; Unable to ambulate or transer.: No (06/16/25 1805)  Nursing Judgement: No (06/16/25 1805)  Outstanding ED orders: NONE    Consults: IP CONSULT TO NEPHROLOGY  IP CONSULT TO INTERNAL MEDICINE  IP CONSULT TO INTERVENTIONAL RADIOLOGY  [x]   Hospitalist  Completed  [x] yes [] no Who:   []  Medicine  Completed  [] yes [] No Who:   []  Cardiology  Completed  [] yes [] No Who:   []  GI   Completed  [] yes [] No Who:   []  Neurology  Completed  [] yes [] No Who:   [x]  Nephrology Completed  [x] yes [] No Who:    []  Vascular  Completed  [] yes [] No Who:   []  Ortho  Completed  [] yes [] No Who:     []  Surgery  Completed  [] yes [] No Who:    []  Urology  Completed  [] yes [] No Who:    []  CT Surgery Completed  [] yes [] No Who:   []  Podiatry  Completed  [] yes [] No Who:    [x]  Other   IR Completed  [x] yes [] No Who:    Situation and Background: Patient was at dialysis today and was to have a dialysis catheter placed. Patient was directed to come to ED due to elevated potassium with additional abnormal labs. Patient did not have catheter placed.     Interventions and Important Events: Patient had his wound vac removed form his right foot today.   10 units Humulin  Dextrose 50% IV  Lokelma 10G  Lokelma 5G     Medication Reconciliation: In Progress Med Rec: Other To do.         *Effective communication is essential throughout this process. It is important for the nurse to document the progress of the med rec to keep team members informed. If any changes are made to the home medication list, the nurse is responsible for notifying the physician of the updated list and documenting the communication.             Updated 02/2025 MW      Electronically signed by Nahomy Calero RN on 6/16/2025 at 9:57 PM

## 2025-06-17 NOTE — PROGRESS NOTES
[x] There are one or more home medications that need clarification before Medication Reconciliation can be completed. The Med List Status has been marked as In Progress. To assist with Home Medication Reconciliation the following actions have been taken:    [x] Pharmacy medication reconciliation service requested. (Note: This can be done by sending a Perfect Serve message to [facility] Rec Pharmacist or by phoning [phone].)    [] Family requested to bring medications into the hospital    [] Family requested to call hospital with medication list    [] Message left with physician office    [] Request for medical records made to     [] Other

## 2025-06-17 NOTE — DISCHARGE INSTR - COC
Continuity of Care Form    Patient Name: Prateek Denton II   :  1960  MRN:  [id number]    Admit date:  2025  Discharge date:  2025    Code Status Order: Full Code   Advance Directives:     Admitting Physician:  Ruben Santoro MD  PCP: Lisseth Coello, APRN - CNP    Discharging Nurse: Minna Dumontarging [facility] Unit/Room#:   Discharging Unit Phone Number: [phone]    Emergency Contact:   Extended Emergency Contact Information  Primary Emergency Contact: Valencia Denton  Address: [address] [place] [id number]  Home Phone: [phone]  Work Phone: [phone]  Mobile Phone: [phone]  Relation: Spouse  Secondary Emergency Contact: ValdemarDiego  Home Phone: [phone]  Mobile Phone: [phone]  Relation: Brother/Sister    Past Surgical History:  Past Surgical History:   Procedure Laterality Date    ADENOIDECTOMY      TWICE AS A CHILD    ANKLE SURGERY Right     RUPTURED ACHILES    CARPAL TUNNEL RELEASE Bilateral     COLONOSCOPY N/A 2023    COLONOSCOPY DIAGNOSTIC performed by Cosme Hutner MD at [facility] OR    COLOSTOMY      temporary    CYSTO/URETERO/PYELOSCOPY, CALCULUS TX Right 10/30/2020    HOLMIUM-STANDBY, CYSTOSCOPY, URETEROSCOPY, STENT EXCHANGE performed by Donal Cano MD at [facility] OR    CYSTOSCOPY Right 2020    CYSTOSCOPY RIGHT URETERAL STENT INSERTION performed by Jeff Johnson MD at [facility] OR    FOOT DEBRIDEMENT Right 2025    FILLET OF RIGHT FIRST TOE, I & D BONE RIGHT CALCANEOUS performed by Trey Del Valle DPM at [facility] OR    FOOT DEBRIDEMENT Right 2025    SKIN GRAFT APPLICATION RIGHT ANKLE performed by Trey Del Valle DPM at [facility] OR    HC CATH POWER PICC TRIPLE  2020    REMOVED AROUND 10/07/2020    HEEL SPUR SURGERY Right 3/31/2025    HEEL EXOSTOSIS EXCISION RIGHT performed by Trey Del Valle DPM at [facility] OR    INCISIONAL HERNIA REPAIR  2024    OPEN INCISIONAL HERNIA REPAIR WITH MESH    IR NONTUNNELED  VASCULAR CATHETER > 5 YEARS  4/11/2025    IR NONTUNNELED VASCULAR CATHETER > 5 YEARS 4/11/2025 Brandt Arredondo MD [facility] SPECIAL PROCEDURES    IR TUNNELED CVC PLACE WO SQ PORT/PUMP > 5 YEARS  4/18/2025    IR TUNNELED CVC PLACE WO SQ PORT/PUMP > 5 YEARS 4/18/2025 Trey Chavez MD [facility] SPECIAL PROCEDURES    KIDNEY SURGERY Left 09/02/2020    LAPAROSCOPIC XI ROBOTIC NEPHRECTOMY performed by Brandyn Garcia MD at [facility] OR    KNEE ARTHROSCOPY Bilateral     LAPAROTOMY N/A 09/23/2020    LAPAROTOMY EXPLORATORY performed by Jagjit Doty DO at [facility] OR    LEG SURGERY Right 4/14/2025    LEG DEBRIDEMENT INCISION AND DRAINAGE PRONE, PULSE LAVAGE performed by Trey Del Valle DPM at [facility] OR    SMALL INTESTINE SURGERY N/A 09/02/2020    EXPLORATORY LAPAROTOMY, RESECTION OF PROXIMAL JEJUNUM AND DESCENDING COLON WITH MOBILIZATION OF SPLENIC FLEXURE AND PRIMARY ANASTAMOSISOF SMALL BOWEL AND COLON, PLACEMENT OF GASTROSTOMY TUBE performed by Jagjit Doty DO at [facility] OR    SMALL INTESTINE SURGERY N/A 12/18/2020    EXPLORATORY LAPAROTOMY,COLOSTOMY REVERSAL, TAP BLOCK DONE IN OR BY DR PACHECO performed by Jagjit Doty DO at [facility] OR    TOE AMPUTATION Right 1/30/2025    TOE AMPUTATION performed by Trey Del Valle DPM at [facility] OR    TONSILLECTOMY      REMOVED 3 TIMES  AS A CHILD    TYMPANOSTOMY TUBE PLACEMENT      UPPER GASTROINTESTINAL ENDOSCOPY N/A 01/05/2022    EGD BIOPSY performed by Olamide Valdez MD at [facility] Endoscopy    UPPER GASTROINTESTINAL ENDOSCOPY N/A 06/21/2023    EGD ESOPHAGOGASTRODUODENOSCOPY performed by Juan Francis MD at [facility] OR    UPPER GASTROINTESTINAL ENDOSCOPY N/A 06/22/2023    EGD CONTROL HEMORRHAGE WITH CLIP APPLICATION performed by Trey Dumont MD at [facility] OR    UPPER GASTROINTESTINAL ENDOSCOPY N/A 06/23/2023    EGD ESOPHAGOGASTRODUODENOSCOPY performed by Trey Dumont MD at [facility] OR    UPPER GASTROINTESTINAL ENDOSCOPY N/A 08/09/2023    EGD BIOPSY performed by Trey Dumont,      Patient Infection Status        Infection Onset Added Last Indicated Last Indicated By Review Planned Expiration    MRSA 04/10/25 04/13/25 04/10/25 Culture, Anaerobic and Aerobic                 Resolved       Infection Onset Added Last Indicated Last Indicated By Resolved Resolved By    Influenza 24 COVID-19 & Influenza Combo 24 Kelsey Graham RN    Originally positive 2024    Influenza 24 Respiratory Panel, Molecular, with COVID-19 (Restricted: peds pts or suitable admitted adults) 03/10/24 Infection     COVID-19 21 COVID-19 21 Infection                          Nurse Assessment:  Last Vital Signs: BP (!) 162/95   Pulse 78   Temp 98.2 °F (36.8 °C) (Oral)   Resp 16   Ht 1.803 m (5' 11\")   Wt 121.1 kg (267 lb)   SpO2 98%   BMI 37.24 kg/m²     Last documented pain score (0-10 scale):    Last Weight:   Wt Readings from Last 1 Encounters:   25 121.1 kg (267 lb)     Mental Status:  oriented, alert, coherent, logical, thought processes intact, and able to concentrate and follow conversation    IV Access:  - Dialysis Catheter  - site  right and subclavian, insertion date: 2025    Nursing Mobility/ADLs:  Walking   Independent  Transfer  Independent  Bathing  Independent  Dressing  Independent  Toileting  Independent  Feeding  Independent  Med Admin  Independent  Med Delivery   whole    Wound Care Documentation and Therapy:  Puncture 25 Internal jugular (Active)   Number of days: 0       Wound 25 Toe (Comment  which one) Right scab (Active)   Number of days: 146       Wound 25 Heel Right quarter size open dry wound (Active)   Number of days: 146       Wound 25 Heel Right (Active)   Number of days: 61       Wound 25 Heel Right (Active)   Dressing Status Clean;Dry;Intact 25 1200   Dressing/Treatment Ace wrap;Dry dressing 25 1200   Number of days: 1

## 2025-06-17 NOTE — PROGRESS NOTES
Occupational Therapy  [facility]  Occupational Therapy Not Seen Note    Patient not available for Occupational Therapy due to:    [] Testing:    [] Hemodialysis    [x] Cancelled by RN:janice manley per CARMELO Walsh as pt has critical potassium levels; continue to follow     []Refusal by Patient:    [] Surgery:     [] Intubation:     [] Pain Medication:    [] Sedation:     [] Spine Precautions :    [] Medical Instability:    [] Other:

## 2025-06-17 NOTE — PROGRESS NOTES
Treatment time: 120min    Net UF: 0    Pre weight: 116.6  Post weight: 116.6  EDW: n.a    Access used: Rt. CVC   Access function: good    Medications or blood products given: heparin samara. CVC ports    Regular outpatient schedule: n.a    Summary of response to treatment: Pt. Tolerated treatment well.     Copy of dialysis treatment record placed in chart, to be scanned into EMR.

## 2025-06-17 NOTE — PLAN OF CARE
Problem: Discharge Planning  Goal: Discharge to home or other facility with appropriate resources  Outcome: Progressing  Flowsheets  Taken 6/17/2025 0312  Discharge to home or other facility with appropriate resources:   Identify barriers to discharge with patient and caregiver   Identify discharge learning needs (meds, wound care, etc)   Refer to discharge planning if patient needs post-hospital services based on physician order or complex needs related to functional status, cognitive ability or social support system   Arrange for needed discharge resources and transportation as appropriate  Taken 6/16/2025 2334  Discharge to home or other facility with appropriate resources:   Identify barriers to discharge with patient and caregiver   Identify discharge learning needs (meds, wound care, etc)   Refer to discharge planning if patient needs post-hospital services based on physician order or complex needs related to functional status, cognitive ability or social support system   Arrange for needed discharge resources and transportation as appropriate     Problem: Safety - Adult  Goal: Free from fall injury  Outcome: Progressing  Flowsheets (Taken 6/17/2025 0312)  Free From Fall Injury: Instruct family/caregiver on patient safety

## 2025-06-17 NOTE — PROGRESS NOTES
Patient admitted to room 2022 from the emergency department per cart. Vitals and general assessment completed as charted. Patient oriented to room, call light, and bed controls. Call light placed within reach, side rails up x 2, and bed in lowest position.

## 2025-06-17 NOTE — CONSULTS
[facility]    Shaukat Rashid MD Zafar Magsi MD Danial Rashid MD John O. Ranker MD Sembria Ligibel, BRIT       Reason for Consult:  End stage renal disease.    Requesting Physician:  Gregory Archibald DO    Assessment:  End stage renal disease.  Mild hyponatremia.  Hyperkalemia.  NAG metabolic acidosis.  Hyperphosphatemia.  Hypertension.  Anemia of ESRD.    Plan:  Scheduled for tunneled HD catheter placement later today.   We will plan for hemodialysis today after catheter placement.  We will follow phosphorus level and start binders as needed.  We will follow H&H and start erythropoeitin stimulating agent as needed.  Will increase NaHCO3 to 1300 mg TID.  On Metoprolol and Amlodipine. Monitor BP.  Avoid Lovenox.  Place on renal diet with 1000 ml oral fluid restriction when allowed to eat.  Adjust medications for low eGFR.  We will follow up chemistries daily in AM while inpatient.    Thank you for the consultation. Please do not hesitate to contact us for any further questions/concerns. We will continue to follow along with you.     HISTORY OF PRESENT ILLNESS:    The patient is a 65 y.o. male who has history of ESRD was scheduled for AVF placement when his potassium was noted to be elevated in the 6s range. His procedure was cancelled and he was sent to the ER and subsequently admitted for hyperkalemia. Upon admission he was noted to have a potassium level of 5.7 that has risen to 6.0 today. He is having diarrhea and has worsening NAG metabolic acidosis.   He is scheduled for tunneled HD catheter today.    Review Of Systems:   Constitutional: No fever, chills, lethargy, weakness or wt loss.  HEENT:  No headache, nasal discharge or sore throat.  Cardiac:  No chest pain, dyspnea, orthopnea or PND.  Chest:   No cough, phlegm or wheezing.  Abdomen:  No abdominal pain, nausea, vomiting or diarrhea.  Neuro:  No gross focal weakness, numbness, abnormal movements or seizure like  Transportation     Lack of Transportation (Medical): No     Lack of Transportation (Non-Medical): No   Physical Activity: Insufficiently Active (6/3/2024)    Exercise Vital Sign     Days of Exercise per Week: 2 days     Minutes of Exercise per Session: 20 min   Stress: Not on file   Social Connections: Not on file   Intimate Partner Violence: Not on file   Housing Stability: Low Risk  (6/16/2025)    Housing Stability Vital Sign     Unable to Pay for Housing in the Last Year: No     Number of Times Moved in the Last Year: 0     Homeless in the Last Year: No       Family History   Problem Relation Age of Onset    Other Mother         AORTIC BYPASS LED TO KIDNEY FAILURE    Kidney Disease Mother     High Blood Pressure Father     Diabetes Father     Stroke Father     Stroke Maternal Grandmother     Stroke Maternal Grandfather          Physical Exam:  Vitals:    06/16/25 2158 06/16/25 2212 06/16/25 2314 06/17/25 0743   BP: (!) 151/77 (!) 144/79 (!) 147/83 (!) 158/89   Pulse: 75 71 80 79   Resp: 13 16 18 18   Temp:   97.3 °F (36.3 °C) 97.5 °F (36.4 °C)   TempSrc:   Oral Oral   SpO2: 99% 99% 99% 100%   Weight:       Height:         I/O last 3 completed shifts:  In: 300 [P.O.:300]  Out: -     General:  Awake, alert, not in distress. Appears to be stated age.  HEENT: Atraumatic, normocephalic. Anicteric sclera. Pink and moist oral mucosa. No carotid bruit. No JVD.  Chest: Bilateral air entry, clear to auscultation, no wheezing, rhonchi or rales.  Cardiovascular: RRR, S1S2, no murmur, rub or gallop. No lower extremity edema.    Abdomen: Soft, non tender to palpation. Active bowel sounds x 4 quadrants.  Musculoskeletal: Active ROM x 4 extremities. No cyanosis or clubbing.  Integumentary: Pink, warm and dry. Free from rash or lesions. Skin turgor normal.  CNS: Oriented to person, place and time. Speech clear. Face symmetrical. No tremor.     Data:  CBC:   Lab Results   Component Value Date    WBC 10.4 06/17/2025    HGB 10.2 (L)  components found for: \"IONCA\"  Magnesium:   Lab Results   Component Value Date/Time    MG 1.6 06/16/2025 07:44 PM     Albumin:   Lab Results   Component Value Date/Time    LABALBU 100 10/09/2024 05:04 PM     Last 3 CK, CKMB, Troponin: @LABRCNT(CKTOTAL:3,CKMB:3,TROPONINI:3)       URINE:)  Lab Results   Component Value Date/Time    NAUR 98 01/22/2025 02:57 PM       Radiology:   Reviewed.    Electronically signed by Jerome Barnes MD  on 6/17/2025 at 11:21 AM  [facility].  Ph: [phone]

## 2025-06-17 NOTE — RT PROTOCOL NOTE
RT Inhaler-Nebulizer Bronchodilator Protocol Note    There is a bronchodilator order in the chart from a provider indicating to follow the RT Bronchodilator Protocol and there is an “Initiate RT Inhaler-Nebulizer Bronchodilator Protocol” order as well (see protocol at bottom of note).    CXR Findings:  No results found.    The findings from the last RT Protocol Assessment were as follows:   History Pulmonary Disease: None or smoker <15 pack years  Respiratory Pattern: Dyspnea on exertion or RR 21-25 bpm  Breath Sounds: Clear breath sounds  Cough: Strong, spontaneous, non-productive  Indication for Bronchodilator Therapy: On home bronchodilators  Bronchodilator Assessment Score: 2    Aerosolized bronchodilator medication orders have been revised according to the RT Inhaler-Nebulizer Bronchodilator Protocol below.    Respiratory Therapist to perform RT Therapy Protocol Assessment initially then follow the protocol.  Repeat RT Therapy Protocol Assessment PRN for score 0-3 or on second treatment, BID, and PRN for scores above 3.    No Indications - adjust the frequency to every 6 hours PRN wheezing or bronchospasm, if no treatments needed after 48 hours then discontinue using Per Protocol order mode.     If indication present, adjust the RT bronchodilator orders based on the Bronchodilator Assessment Score as indicated below.  Use Inhaler orders unless patient has one or more of the following: on home nebulizer, not able to hold breath for 10 seconds, is not alert and oriented, cannot activate and use MDI correctly, or respiratory rate 25 breaths per minute or more, then use the equivalent nebulizer order(s) with same Frequency and PRN reasons based on the score.  If a patient is on this medication at home then do not decrease Frequency below that used at home.    0-3 - enter or revise RT bronchodilator order(s) to equivalent RT Bronchodilator order with Frequency of every 4 hours PRN for wheezing or increased work of

## 2025-06-17 NOTE — PROGRESS NOTES
Spiritual Health History and Assessment/Progress Note  [facility]    (P) Spiritual/Emotional Needs,  ,  ,      Name: Prateek Denton II MRN: [id number]    Age: 65 y.o.     Sex: male   Language: English   Church: [employer]   Hyperkalemia     Date: 6/17/2025            Total Time Calculated: (P) 27 min              Spiritual Assessment began in [facility]        Referral/Consult From: (P) Nurse   Encounter Overview/Reason: (P) Spiritual/Emotional Needs  Service Provided For: (P) Patient    Patient engaged readily and pleasantly with  talking about his recent decision to go on dialysis, his outlook on interacting with others, and his firm belief that he will go to Pending sale to [facility] when he dies \"so I'm not afraid, I'm ready.\"  provided a supportive presence, active listening, thoughts for the patient to consider regarding his views about how to interact with others, connection and prayer. Patient thanked  for support and prayer.    Irais, Belief, Meaning:   Patient identifies as spiritual and has beliefs or practices that help with coping during difficult times  Family/Friends No family/friends present      Importance and Influence:  Patient has spiritual/personal beliefs that influence decisions regarding their health  Family/Friends No family/friends present    Community:  Patient feels well-supported. Support system includes: Spouse/Partner  Family/Friends No family/friends present    Assessment and Plan of Care:     Patient Interventions include: Facilitated expression of thoughts and feelings, Explored spiritual coping/struggle/distress, and Facilitated life review and/ or legacy  Family/Friends Interventions include: No family/friends present    Patient Plan of Care: Spiritual Care available upon further referral  Family/Friends Plan of Care: Spiritual Care available upon further referral    Electronically signed by Chaplain Dontae on 6/17/2025 at 10:54 AM

## 2025-06-17 NOTE — H&P
[facility]  Office: [phone]  Aung Luis, DO, Mansoor Cline, DO, Josse Han DO, Gregory Archibald, DO, Andrew Roberts MD, Mayra Medel MD, Ray Johnson MD, Romy Amador MD,  Mic Meraz MD, Rom Hayes MD, Casandra Washington MD,  Ely Burks DO, Dorie Anderson MD, Marcos Travis MD, Trey Luis DO, Delia Clemens MD,  Jorge Major DO, Rose Yi MD, Celia Guillen MD, Matthew Hyatt MD,  Sammy Galvez MD, Mary Kay Karimi MD, Edson Hay MD, Mateo Turner MD, Stan Cline MD, Radha Hoskins MD, Mikael Cm, DO, Virgie Josue MD, Ananya Claire DO, Eran Mckenzie MD, Ely Blackman MD, Mohsin Reza, MD, George Castaneda MD, Shirley Waterhouse, CNP,  Missy Chowdhury, CNP, Mikael Cai, CNP,  Shantelle Monson, KIMI, Betty Boles, CNP, Yany Garcia, CNP, Cathie Law, CNP, Lynda Russell, CNP, An King, PA-C, Gabby Vaughn, CNP, Saray Patton, CNP,  Lacey Wilkinson, CNP, Nidhi Sinha, CNP, Hank Gilbert, PA-C, Jaclyn Neff, CNP,  Patsy Peterson, CNS, Zulay Ramsay, CNP, Martha Prince, CNP,   Mariela Benedict, CNP         [facility]   IN-PATIENT SERVICE   [facility]    HISTORY AND PHYSICAL EXAMINATION            Date:   6/16/2025  Patient name:  Prateek Denton II  Date of admission:  6/16/2025  7:21 PM  MRN:   [id number]  Account:  [id number]  YOB: 1960  PCP:    Lisseth Coello, TORREY - BRIT  Room:   [place] [address]  Code Status:    Full Code    Chief Complaint:     No chief complaint on file.  Hyperkalemia    History Obtained From:     patient, electronic medical record    History of Present Illness:     Prateek Denton II is a 65 y.o. Non- / non  male who presents with No chief complaint on file.   and is admitted to the hospital for the management of Hyperkalemia.    65-year-old male past medical history of hypertension, anxiety, right heel infection, a flutter, and CKD presents emergency department for dialysis  History:   Procedure Laterality Date    ADENOIDECTOMY      TWICE AS A CHILD    ANKLE SURGERY Right 2010    RUPTURED ACHILES    CARPAL TUNNEL RELEASE Bilateral     COLONOSCOPY N/A 04/18/2023    COLONOSCOPY DIAGNOSTIC performed by Cosme Hunter MD at [facility] OR    COLOSTOMY      temporary    CYSTO/URETERO/PYELOSCOPY, CALCULUS TX Right 10/30/2020    HOLMIUM-STANDBY, CYSTOSCOPY, URETEROSCOPY, STENT EXCHANGE performed by Donal Cano MD at [facility] OR    CYSTOSCOPY Right 09/01/2020    CYSTOSCOPY RIGHT URETERAL STENT INSERTION performed by Jeff Johnson MD at [facility] OR    FOOT DEBRIDEMENT Right 1/30/2025    FILLET OF RIGHT FIRST TOE, I & D BONE RIGHT CALCANEOUS performed by Trey Del Valle DPM at [facility] OR    FOOT DEBRIDEMENT Right 6/4/2025    SKIN GRAFT APPLICATION RIGHT ANKLE performed by Trey Del Valle DPM at [facility] OR    HC CATH POWER PICC TRIPLE  09/03/2020    REMOVED AROUND 10/07/2020    HEEL SPUR SURGERY Right 3/31/2025    HEEL EXOSTOSIS EXCISION RIGHT performed by Trey Del Valle DPM at [facility] OR    INCISIONAL HERNIA REPAIR  05/08/2024    OPEN INCISIONAL HERNIA REPAIR WITH MESH    IR NONTUNNELED VASCULAR CATHETER > 5 YEARS  4/11/2025    IR NONTUNNELED VASCULAR CATHETER > 5 YEARS 4/11/2025 Brandt Arredondo MD [facility] SPECIAL PROCEDURES    IR TUNNELED CVC PLACE WO SQ PORT/PUMP > 5 YEARS  4/18/2025    IR TUNNELED CVC PLACE WO SQ PORT/PUMP > 5 YEARS 4/18/2025 Trey Chavez MD [facility] SPECIAL PROCEDURES    KIDNEY SURGERY Left 09/02/2020    LAPAROSCOPIC XI ROBOTIC NEPHRECTOMY performed by Brandyn Garcia MD at [facility] OR    KNEE ARTHROSCOPY Bilateral     LAPAROTOMY N/A 09/23/2020    LAPAROTOMY EXPLORATORY performed by Jagjit Doty DO at [facility] OR    LEG SURGERY Right 4/14/2025    LEG DEBRIDEMENT INCISION AND DRAINAGE PRONE, PULSE LAVAGE performed by Trey Del Valle DPM at [facility] OR    SMALL INTESTINE SURGERY N/A 09/02/2020    EXPLORATORY LAPAROTOMY, RESECTION OF PROXIMAL JEJUNUM AND DESCENDING COLON

## 2025-06-17 NOTE — BRIEF OP NOTE
Brief Postoperative Note    Prateek Denton II  YOB: 1960  [id number]    Pre-operative Diagnosis: Hyperkalemia    Post-operative Diagnosis: Same    Procedure: tunneled dialysis catheter placement    Anesthesia: Local + IV fentanyl    Surgeons/Assistants: Trey Chavez MD     Estimated Blood Loss: minimal    Complications: none immediate    Specimens: were not obtained    Findings: Successful image-guided 14.5 Fr x 23 cm tip to cuff tunneled dialysis catheter placement via the right IJ.  Catheter is ready for use.      Electronically signed by Trey Chavez MD on 6/17/2025 at 3:27 PM

## 2025-06-17 NOTE — CARE COORDINATION
Social work:  Writer informed of need for outpatient dialysis arrangements.   Nearest location to pt's home [facility]   Referral submitted via portal to [facility].   Medical/financial clearance pending.     Update 1245- Chair available at [facility] MWF 1232.  Medical and financial clearance pending.  Need chest xray to r/o TB and Hepatitis B panel- Lisseth/CARMELO informed and will get order.

## 2025-06-17 NOTE — PLAN OF CARE
Problem: Chronic Conditions and Co-morbidities  Goal: Patient's chronic conditions and co-morbidity symptoms are monitored and maintained or improved  Outcome: Progressing     Problem: Discharge Planning  Goal: Discharge to home or other facility with appropriate resources  6/17/2025 1618 by Jillian Horton RN  Outcome: Progressing     Problem: Safety - Adult  Goal: Free from fall injury  6/17/2025 1618 by Jillian Horton RN  Outcome: Progressing

## 2025-06-17 NOTE — PROGRESS NOTES
Physical Therapy  DATE: 2025    NAME: Prateek Denton II  MRN: [id number]   : 1960    Patient not seen this date for Physical Therapy due to:      [] Cancel by RN or physician due to:    [] Hemodialysis    [] Critical Lab Value Level     [] Blood transfusion in progress    [] Acute or unstable cardiovascular status   _MAP < 55 or more than >115  _HR < 40 or > 130    [] Acute or unstable pulmonary status   -FiO2 > 60%   _RR < 5 or >40    _O2 sats < 85%    [] Strict Bedrest    [] Off Unit for surgery or procedure    [] Off Unit for testing       [] Pending imaging to R/O fracture    [] Refusal by Patient      [x] Other RN: hold eval per RN Jillian as pt has critical potassium levels; PT continue to follow      [] PT being discontinued at this time. Patient independent. No further needs.     [] PT being discontinued at this time as the patient has been transferred to hospice care. No further needs.      Ave Gasca, PT

## 2025-06-17 NOTE — PROGRESS NOTES
[facility]  Office: [phone]  Aung Luis, DO, Mansoor Cline, DO, Josse Han DO, Gregory Archibald, DO, Andrew Roberts MD, Mayra Medel MD, Ray Johnson MD, Romy Amador MD,  Mic Meraz MD, Rom Hayes MD, Casandra Washington MD,  Ely Burks DO, Dorie Anderson MD, Marcos Travis MD, Trey Luis DO, Delia Clemens MD,  Jorge Major DO, Rose Yi MD, Celia Guillen MD, Matthew Hyatt MD,  Sammy Galvez MD, Mary Kay Karimi MD, Edson Hay MD, Mateo Turner MD, Stan Cline MD, Radha Hoskins MD, Mikael Cm, DO, Virgie Josue MD, Ananya Claire DO, Eran Mckenzie MD, Ely Blackman MD, Mohsin Reza, MD, George Castaneda MD, Shirley Waterhouse, CNP,  Missy Chowdhury, CNP, Mikael Cai, CNP,  Shantelle Monson, KIMI, Betty Boles, CNP, Yany Garcia, CNP, Cathie Law, CNP, Lynda Russell, CNP, An King, PA-C, Gabby Vaughn, CNP, Saray Pattno, CNP,  Lacey Wilkinson, CNP, Nidhi Sinha, CNP, Hank Gilbert, PA-C, Jaclyn Neff, CNP,  Patsy Peterson, CNS, Zulay Ramsay, CNP, Martha Prince, CNP,   Mariela Benedict, CNP         [facility]   IN-PATIENT SERVICE   [facility]    Progress Note    6/17/2025    3:01 PM    Name:   Prateek Denton II  MRN:     [id number]     Acct:      [id number]   Room:   2022/2022-01  IP Day:  1  Admit Date:  6/16/2025  7:21 PM    PCP:   Lisseth Coello, APRN - CNP  Code Status:  Full Code    Subjective:     C/C: high k  Interval History Status: not changed.     K still high this am, he is asymptomatic from it    Denies cp/sob/n/v/abd pain    Brief History:     Per Nocturnist:  \"65-year-old male past medical history of hypertension, anxiety, right heel infection, a flutter, and CKD presents emergency department for dialysis and hemodialysis tunneled catheter by IR. He was supposed to have a dialysis cath placed today however, due to hyperkalemia patient was recommended come into the emergency department for placement  disease) stage 3, GFR 30-59 ml/min (HCC), Colostomy RUQ, Frequent headaches, Gastrointestinal hemorrhage with melena, Gout, Hemorrhagic shock (HCC), Hernia of abdominal wall, History of atrial fibrillation, History of blood transfusion, [place] (hard of hearing), HTN, Hyperkalemia, Incisional hernia, Kidney infection, Large bowel obstruction (HCC), Morbid obesity due to excess calories (HCC), Paroxysmal A-fib (HCC), SBO (small bowel obstruction) (HCC), Sepsis (HCC), Single kidney, Sleep apnea, T2DM, Tooth missing, Under care of team, Upper GI bleed, Wears glasses, and Wellness examination.    Social History:   reports that he has never smoked. He quit smokeless tobacco use about 45 years ago.  His smokeless tobacco use included chew. He reports current alcohol use of about 2.0 standard drinks of alcohol per week. He reports current drug use. Frequency: 1.00 time per week. Drug: Marijuana (Weed).     Family History:   Family History   Problem Relation Age of Onset    Other Mother         AORTIC BYPASS LED TO KIDNEY FAILURE    Kidney Disease Mother     High Blood Pressure Father     Diabetes Father     Stroke Father     Stroke Maternal Grandmother     Stroke Maternal Grandfather        Vitals:  BP (!) 162/95   Pulse 79   Temp 98.2 °F (36.8 °C) (Oral)   Resp 16   Ht 1.803 m (5' 11\")   Wt 121.1 kg (267 lb)   SpO2 100%   BMI 37.24 kg/m²   Temp (24hrs), Av.8 °F (36.6 °C), Min:97.3 °F (36.3 °C), Max:98.2 °F (36.8 °C)    Recent Labs     25  2135 259 25  1012   POCGLU 114* 100 65* 70*       I/O (24Hr):    Intake/Output Summary (Last 24 hours) at 2025 1501  Last data filed at 2025 0821  Gross per 24 hour   Intake 321.62 ml   Output --   Net 321.62 ml       Labs:  Hematology:  Recent Labs     25  1944 25  0451   WBC 9.0 10.4   RBC 3.69* 3.49*   HGB 10.8* 10.2*   HCT 31.9* 30.2*   MCV 86.4 86.5   MCH 29.3 29.2   MCHC 33.9 33.8   RDW 15.0* 14.7*    196

## 2025-06-18 LAB
ANION GAP SERPL CALCULATED.3IONS-SCNC: 12 MMOL/L (ref 9–16)
BASOPHILS # BLD: 0.06 K/UL (ref 0–0.2)
BASOPHILS NFR BLD: 1 % (ref 0–2)
BUN SERPL-MCNC: 55 MG/DL (ref 8–23)
CALCIUM SERPL-MCNC: 8.4 MG/DL (ref 8.8–10.2)
CHLORIDE SERPL-SCNC: 107 MMOL/L (ref 98–107)
CO2 SERPL-SCNC: 20 MMOL/L (ref 20–31)
CREAT SERPL-MCNC: 4.6 MG/DL (ref 0.7–1.2)
EOSINOPHIL # BLD: 0.36 K/UL (ref 0–0.44)
EOSINOPHILS RELATIVE PERCENT: 4 % (ref 1–4)
ERYTHROCYTE [DISTWIDTH] IN BLOOD BY AUTOMATED COUNT: 14.7 % (ref 11.8–14.4)
GFR, ESTIMATED: 13 ML/MIN/1.73M2
GLUCOSE BLD-MCNC: 123 MG/DL (ref 75–110)
GLUCOSE BLD-MCNC: 135 MG/DL (ref 75–110)
GLUCOSE BLD-MCNC: 89 MG/DL (ref 75–110)
GLUCOSE BLD-MCNC: 96 MG/DL (ref 75–110)
GLUCOSE SERPL-MCNC: 92 MG/DL (ref 82–115)
HCT VFR BLD AUTO: 30.7 % (ref 40.7–50.3)
HGB BLD-MCNC: 10.3 G/DL (ref 13–17)
IMM GRANULOCYTES # BLD AUTO: 0.07 K/UL (ref 0–0.3)
IMM GRANULOCYTES NFR BLD: 1 %
LYMPHOCYTES NFR BLD: 1.99 K/UL (ref 1.1–3.7)
LYMPHOCYTES RELATIVE PERCENT: 21 % (ref 24–43)
MCH RBC QN AUTO: 29 PG (ref 25.2–33.5)
MCHC RBC AUTO-ENTMCNC: 33.6 G/DL (ref 28.4–34.8)
MCV RBC AUTO: 86.5 FL (ref 82.6–102.9)
MONOCYTES NFR BLD: 1.03 K/UL (ref 0.1–1.2)
MONOCYTES NFR BLD: 11 % (ref 3–12)
NEUTROPHILS NFR BLD: 62 % (ref 36–65)
NEUTS SEG NFR BLD: 5.98 K/UL (ref 1.5–8.1)
NRBC BLD-RTO: 0 PER 100 WBC
PLATELET # BLD AUTO: 182 K/UL (ref 138–453)
PMV BLD AUTO: 10.4 FL (ref 8.1–13.5)
POTASSIUM SERPL-SCNC: 5.5 MMOL/L (ref 3.7–5.3)
RBC # BLD AUTO: 3.55 M/UL (ref 4.21–5.77)
RBC # BLD: ABNORMAL 10*6/UL
SODIUM SERPL-SCNC: 139 MMOL/L (ref 136–145)
WBC OTHER # BLD: 9.5 K/UL (ref 3.5–11.3)

## 2025-06-18 PROCEDURE — 80048 BASIC METABOLIC PNL TOTAL CA: CPT

## 2025-06-18 PROCEDURE — 1200000000 HC SEMI PRIVATE

## 2025-06-18 PROCEDURE — 97166 OT EVAL MOD COMPLEX 45 MIN: CPT

## 2025-06-18 PROCEDURE — 97162 PT EVAL MOD COMPLEX 30 MIN: CPT

## 2025-06-18 PROCEDURE — 2500000003 HC RX 250 WO HCPCS: Performed by: STUDENT IN AN ORGANIZED HEALTH CARE EDUCATION/TRAINING PROGRAM

## 2025-06-18 PROCEDURE — 6370000000 HC RX 637 (ALT 250 FOR IP): Performed by: STUDENT IN AN ORGANIZED HEALTH CARE EDUCATION/TRAINING PROGRAM

## 2025-06-18 PROCEDURE — 97530 THERAPEUTIC ACTIVITIES: CPT

## 2025-06-18 PROCEDURE — 6360000002 HC RX W HCPCS: Performed by: EMERGENCY MEDICINE

## 2025-06-18 PROCEDURE — 5A09357 ASSISTANCE WITH RESPIRATORY VENTILATION, LESS THAN 24 CONSECUTIVE HOURS, CONTINUOUS POSITIVE AIRWAY PRESSURE: ICD-10-PCS | Performed by: INTERNAL MEDICINE

## 2025-06-18 PROCEDURE — 36415 COLL VENOUS BLD VENIPUNCTURE: CPT

## 2025-06-18 PROCEDURE — 99232 SBSQ HOSP IP/OBS MODERATE 35: CPT | Performed by: INTERNAL MEDICINE

## 2025-06-18 PROCEDURE — 6370000000 HC RX 637 (ALT 250 FOR IP): Performed by: INTERNAL MEDICINE

## 2025-06-18 PROCEDURE — 85025 COMPLETE CBC W/AUTO DIFF WBC: CPT

## 2025-06-18 PROCEDURE — 82947 ASSAY GLUCOSE BLOOD QUANT: CPT

## 2025-06-18 PROCEDURE — 6370000000 HC RX 637 (ALT 250 FOR IP): Performed by: NURSE PRACTITIONER

## 2025-06-18 PROCEDURE — 6360000002 HC RX W HCPCS: Performed by: INTERNAL MEDICINE

## 2025-06-18 PROCEDURE — 97116 GAIT TRAINING THERAPY: CPT

## 2025-06-18 RX ORDER — SODIUM BICARBONATE 650 MG/1
1300 TABLET ORAL 2 TIMES DAILY
Status: DISCONTINUED | OUTPATIENT
Start: 2025-06-18 | End: 2025-06-19 | Stop reason: HOSPADM

## 2025-06-18 RX ADMIN — DULOXETINE 30 MG: 30 CAPSULE, DELAYED RELEASE ORAL at 09:46

## 2025-06-18 RX ADMIN — HEPARIN SODIUM 5000 UNITS: 5000 INJECTION INTRAVENOUS; SUBCUTANEOUS at 21:01

## 2025-06-18 RX ADMIN — AMLODIPINE BESYLATE 10 MG: 10 TABLET ORAL at 09:46

## 2025-06-18 RX ADMIN — SODIUM BICARBONATE 1300 MG: 650 TABLET ORAL at 21:01

## 2025-06-18 RX ADMIN — SODIUM CHLORIDE, PRESERVATIVE FREE 10 ML: 5 INJECTION INTRAVENOUS at 09:46

## 2025-06-18 RX ADMIN — METOPROLOL 100 MG: 100 TABLET ORAL at 21:01

## 2025-06-18 RX ADMIN — SODIUM BICARBONATE 1300 MG: 650 TABLET ORAL at 09:46

## 2025-06-18 RX ADMIN — HYDROXYZINE HYDROCHLORIDE 50 MG: 25 TABLET, FILM COATED ORAL at 21:01

## 2025-06-18 RX ADMIN — HEPARIN SODIUM 1900 UNITS: 1000 INJECTION INTRAVENOUS; SUBCUTANEOUS at 15:44

## 2025-06-18 RX ADMIN — SODIUM CHLORIDE, PRESERVATIVE FREE 10 ML: 5 INJECTION INTRAVENOUS at 21:05

## 2025-06-18 RX ADMIN — Medication 1 CAPSULE: at 09:46

## 2025-06-18 RX ADMIN — Medication 3 MG: at 21:01

## 2025-06-18 RX ADMIN — HEPARIN SODIUM 5000 UNITS: 5000 INJECTION INTRAVENOUS; SUBCUTANEOUS at 05:43

## 2025-06-18 RX ADMIN — Medication 1000 UNITS: at 21:01

## 2025-06-18 RX ADMIN — HEPARIN SODIUM 1900 UNITS: 1000 INJECTION INTRAVENOUS; SUBCUTANEOUS at 15:43

## 2025-06-18 RX ADMIN — METOPROLOL 100 MG: 100 TABLET ORAL at 09:46

## 2025-06-18 RX ADMIN — TRAZODONE HYDROCHLORIDE 50 MG: 50 TABLET ORAL at 21:01

## 2025-06-18 NOTE — CARE COORDINATION
Social Work-Received call from [facility]. Patient's nephrologist does not round at that location. His nephrologist will round at [facility] location. Discussed with patient. He prefers [facility]. [facility]. He will update [facility] Intake. He states that the update should be complete by tomorrow. Will check the portal tomorrow for treatment days and time at [facility]. Aurea

## 2025-06-18 NOTE — CARE COORDINATION
Social Work-Received phone call from [facility]. They anticipate a chair time at [facility] M/W/F at 10:50 AM pending medical clearance. Aurea

## 2025-06-18 NOTE — PROGRESS NOTES
Physical Therapy  Facility/Department: [facility]   Physical Therapy Initial Evaluation    Patient Name: Prateek Denton II        MRN: [id number]    : 1960    Date of Service: 2025    Hyperkalemia    Past Medical History:  has a past medical history of Acute blood loss anemia, Arthritis, Back pain, Cellulitis, CKD (chronic kidney disease) stage 3, GFR 30-59 ml/min (HCC), Colostomy RUQ, Frequent headaches, Gastrointestinal hemorrhage with melena, Gout, Hemorrhagic shock (HCC), Hernia of abdominal wall, History of atrial fibrillation, History of blood transfusion, [place] (hard of hearing), HTN, Hyperkalemia, Incisional hernia, Kidney infection, Large bowel obstruction (HCC), Morbid obesity due to excess calories (HCC), Paroxysmal A-fib (HCC), SBO (small bowel obstruction) (HCC), Sepsis (HCC), Single kidney, Sleep apnea, T2DM, Tooth missing, Under care of team, Upper GI bleed, Wears glasses, and Wellness examination.  Past Surgical History:  has a past surgical history that includes Ankle surgery (Right, ); Carpal tunnel release (Bilateral); Cystoscopy (Right, 2020); hc cath power picc triple (2020); Kidney surgery (Left, 2020); Small intestine surgery (N/A, 2020); laparotomy (N/A, 2020); Tonsillectomy; Knee arthroscopy (Bilateral); Adenoidectomy; cysto/uretero/pyeloscopy, calculus tx (Right, 10/30/2020); colostomy; Small intestine surgery (N/A, 2020); Tympanostomy tube placement; Upper gastrointestinal endoscopy (N/A, 2022); Colonoscopy (N/A, 2023); Upper gastrointestinal endoscopy (N/A, 2023); Upper gastrointestinal endoscopy (N/A, 2023); Upper gastrointestinal endoscopy (N/A, 2023); Upper gastrointestinal endoscopy (N/A, 2023); Upper gastrointestinal endoscopy (N/A, 2024); Incisional hernia repair (2024); ventral hernia repair (N/A, 2024); Foot Debridement (Right, 2025); Toe amputation (Right, 2025);  Heel spur surgery (Right, 3/31/2025); IR NONTUNNELED VASCULAR CATHETER > 5 YEARS (4/11/2025); Leg Surgery (Right, 4/14/2025); IR TUNNELED CVC PLACE WO SQ PORT/PUMP > 5 YEARS (4/18/2025); Foot Debridement (Right, 6/4/2025); and IR TUNNELED CVC PLACE WO SQ PORT/PUMP > 5 YEARS (6/17/2025).    Discharge Recommendations          Assessment  Body Structures, Functions, Activity Limitations Requiring Skilled Therapeutic Intervention: Decreased functional mobility , Decreased balance, Decreased safe awareness, Decreased endurance    Assessment: Skilled therapy needed to maximize independence with functional mobility as well as improve endurance, balance and overall safety. Patient currently SBA with gait without device and transfers, patient with recent R great toe amputation and heel wound and balance altered due to that. Will progress as able.    Therapy Prognosis: Good  Decision Making: Medium Complexity  Requires PT Follow-Up: Yes  Activity Tolerance  Activity Tolerance: Patient tolerated treatment well  Safety Devices  Type of Devices: Call light within reach, Left in bed, Gait belt, Nurse notified    AM-[facility]-P[facility] Basic Mobility - Inpatient   How much help is needed turning from your back to your side while in a flat bed without using bedrails?: None  How much help is needed moving from lying on your back to sitting on the side of a flat bed without using bedrails?: None  How much help is needed moving to and from a bed to a chair?: A Little  How much help is needed standing up from a chair using your arms?: None  How much help is needed walking in hospital room?: A Little  How much help is needed climbing 3-5 steps with a railing?: A Little  AM-[facility] Inpatient Mobility Raw Score : 21  AM-PAC Inpatient T-Scale Score : 50.25  Mobility Inpatient CMS 0-100% Score: 28.97  Mobility Inpatient CMS G-Code Modifier : CJ    Restrictions/Precautions  Restrictions/Precautions  Restrictions/Precautions: Contact Precautions, General

## 2025-06-18 NOTE — PROGRESS NOTES
[facility]    Shaukat Rashid MD Zafar Magsi MD Danial Rashid MD John O. Ranker MD Sembria Ligibel, CNP           Reason for Follow up: ESRD.    Assessment:  End stage renal disease.  Mild hyponatremia, improved.  Hyperkalemia, improving.  NAG metabolic acidosis, improving.  Hyperphosphatemia.  Hypertension.  Anemia of ESRD.     Plan:  S/p tunneled HD catheter placement 6/17/25.   We will plan for hemodialysis # 2 today.  We will follow phosphorus level and start binders as needed.  We will follow H&H and start erythropoeitin stimulating agent as needed.  Will decrease NaHCO3 to 1300 mg BID.  On Metoprolol and Amlodipine with holding parameters. Monitor BP.  Avoid Lovenox.  Maintain on renal diet with 1000 ml oral fluid restriction.  Adjust medications for low eGFR.  We will follow up chemistries daily in AM while inpatient.    Please do not hesitate to call with questions. We will follow with you.    SUBJECTIVE:    No new complaints.    Review Of Systems:   Constitutional: No fever, chills, lethargy, weakness or wt loss.  Cardiac:  No chest pain, dyspnea, orthopnea or PND.  Pulmonary:  No cough, phlegm or wheezing.  Abdomen:  No abdominal pain, nausea, vomiting or diarrhea.  :   No hematuria, dysuria or flank pain.  Extremities:  No swelling or joint pains.    Scheduled Meds:   sodium bicarbonate  1,300 mg Oral BID    heparin (porcine)  5,000 Units SubCUTAneous 3 times per day    amLODIPine  10 mg Oral Daily    DULoxetine  30 mg Oral Daily    hydrOXYzine HCl  50 mg Oral Nightly    lactobacillus  1 capsule Oral Daily with breakfast    metoprolol  100 mg Oral BID    traZODone  50 mg Oral Nightly    Vitamin D  1,000 Units Oral Daily    sodium chloride flush  5-40 mL IntraVENous 2 times per day   Continuous Infusions:   dextrose      sodium chloride       PRN Meds:glucose, dextrose bolus **OR** dextrose bolus, glucagon (rDNA), dextrose, heparin (porcine), heparin (porcine),  albuterol sulfate HFA, fluticasone, vashe wound therapy, sodium chloride flush, sodium chloride, ondansetron **OR** ondansetron, polyethylene glycol, acetaminophen **OR** acetaminophen    Allergies   Allergen Reactions    Ampicillin Swelling     Swelling of throat.   Pt tolerated cefepime during 4/10/2025 admission    Pcn [Penicillins] Swelling     Throat swelling.  Tolerated cefepime during 8/31/20 admission.    Sulfa Antibiotics      Other reaction(s): Unknown    Tape [Adhesive Tape] Other (See Comments)     CAUSES REDNESS. PAPER TAPE OK.       Physical Exam:  Blood pressure (!) 150/85, pulse 73, temperature 98.1 °F (36.7 °C), temperature source Oral, resp. rate 17, height 1.803 m (5' 11\"), weight 114.3 kg (251 lb 14.4 oz), SpO2 100%.  In: 521.6 [I.V.:21.6]  Out: 500   In: 521.6   Out: 500     General:  Awake, alert, not in distress. Appears to be stated age.  HEENT: Atraumatic, normocephalic. Anicteric sclera. Pink and moist oral mucosa. No carotid bruit. No JVD.  Chest: Bilateral air entry, clear to auscultation, no wheezing, rhonchi or rales.  Cardiovascular: RRR, S1S2, no murmur, rub or gallop. Has lower extremity edema.    Abdomen: Soft, non tender to palpation. Active bowel sounds x 4 quadrants.  Musculoskeletal: Active ROM x 4 extremities. No cyanosis or clubbing.  Integumentary: Pink, warm and dry. Free from rash or lesions. Skin turgor normal.  CNS: Oriented to person, place and time. Speech clear. Face symmetrical. No tremor.     Data:  CBC:   Lab Results   Component Value Date    WBC 9.5 06/18/2025    HGB 10.3 (L) 06/18/2025    HCT 30.7 (L) 06/18/2025    MCV 86.5 06/18/2025     06/18/2025     BMP:    Lab Results   Component Value Date     06/18/2025    K 5.5 (H) 06/18/2025     06/18/2025    CO2 20 06/18/2025    BUN 55 (H) 06/18/2025    CREATININE 4.6 (H) 06/18/2025    GLUCOSE 92 06/18/2025     CMP:   Lab Results   Component Value Date     06/18/2025    K 5.5 (H) 06/18/2025    CL

## 2025-06-18 NOTE — DIALYSIS
HEMODIALYSIS POST TREATMENT NOTE    Treatment time ordered: 2.5 hours    Actual treatment time: 2.5 hours    UltraFiltration Goal: 0  UltraFiltration Removed: 500 ml      Pre Treatment weight: 116.5  Post Treatment weight: 116.5  Estimated Dry Weight: TBD    Access used:     Central Venous Catheter:          Tunneled or Non-tunneled: tunneled           Site: right upper chest          Access Flow: good 250 bfr as prescribed       Internal Access:       AV Fistula or AV Graft: na         Site: na       Access Flow: na       Sign and symptoms of infection: none       If YES: na    Medications or blood products given: none    Chronic outpatient schedule: [facility]    Chronic outpatient unit: [facility]    Summary of response to treatment: client tolerated tx with no problems noted ,  left alert and oreinted x 3 stable condition with primary nurse    Explain if orders NOT met, was physician notified:veda BOYCE flowsheet faxed to patient unit/ placed in patient chart: yes    Post assessment completed: yes    Report given to: Minna Boone Rn      * Intra-treatment documented Safety Checks include the followin) Access and face visible at all times.     2) All connections and blood lines are secure with no kinks.     3) NVL alarm engaged.     4) Hemosafe device applied (if applicable).     5) No collapse of Arterial or Venous blood chambers.     6) All blood lines / pump segments in the air detectors. 2.5 hours

## 2025-06-18 NOTE — PROGRESS NOTES
Occupational Therapy Initial Evaluation  Facility/Department: [facility]   Patient Name: Prateek Denton II        MRN: [id number]    : 1960    Date of Service: 2025    CARMELO Buitrago reports patient is medically stable for therapy treatment this date.    Chart reviewed prior to treatment and patient is agreeable for therapy.  All lines intact and patient positioned comfortably at end of treatment.  All patient needs addressed prior to ending therapy session.       Discharge Recommendations  Discharge Recommendations: Patient would benefit from continued therapy after discharge  OT Equipment Recommendations  Equipment Needed:  (CTA)    No chief complaint on file.      Per H&P: Prateek Denton II is a 65 y.o. Non- / non  male who presents with No chief complaint on file. and is admitted to the hospital for the management of Hyperkalemia.     65-year-old male past medical history of hypertension, anxiety, right heel infection, a flutter, and CKD presents emergency department for dialysis and hemodialysis tunneled catheter by IR.  He was supposed to have a dialysis cath placed today however, due to hyperkalemia patient was recommended come into the emergency department for placement of HD cath.  Patient will also require hemodialysis moving forward.  Patient denies nausea, vomiting, diarrhea, shortness breath, cough, headache, numbness, or tingling      Past Medical History:  has a past medical history of Acute blood loss anemia, Arthritis, Back pain, Cellulitis, CKD (chronic kidney disease) stage 3, GFR 30-59 ml/min ([facility]), Colostomy RUQ, Frequent headaches, Gastrointestinal hemorrhage with melena, Gout, Hemorrhagic shock ([facility]), Hernia of abdominal wall, History of atrial fibrillation, History of blood transfusion, [place] (hard of hearing), HTN, Hyperkalemia, Incisional hernia, Kidney infection, Large bowel obstruction (HCC), Morbid obesity due to excess calories ([facility]), Paroxysmal A-fib ([facility]), SBO  mobility  Supine to Sit: Stand by assistance  Sit to Supine: Stand by assistance  Scooting: Stand by assistance  Bed Mobility Comments: HOB mildly elevated; increased time/effort to sit up to EOB. Pt denies feeling lightheaded or dizzy with position changes         Transfers  Sit to stand: Stand by assistance  Stand to sit: Stand by assistance  Transfer Comments: Transfer training completed this session w/o a device, and min verbal/tactile cues provided for safety and on general fall prevention strategies         Functional Mobility: Stand by assistance  Functional Mobility Skilled Clinical Factors: Pt demonstrated functional mobility in room with SBA and no device. Pt takes shorter steps, is mildly unsteady due to recent R great toe amputation and has only been allowed to bear full weight through R foot since this past Monday. Has mainly been using a knee scooter for mobility when RLE was NWB'ing. Educated pt on fall prevention strategies and to call out for staff assist for mobility as needed.         Patient Education  Patient Education  Education Given To: Patient  Education Provided: Role of Therapy;Plan of Care;Transfer Training;ADL Adaptive Strategies;Fall Prevention Strategies;Mobility Training;Equipment;Energy Conservation;Precautions  Education Method: Verbal  Barriers to Learning: None  Education Outcome: Verbalized understanding;Continued education needed    Goals  Patient Goals   Patient goals : To go home  Short Term Goals  Time Frame for Short Term Goals: By discharge, pt to demo:  Short Term Goal 1: bed mobility tasks to IND with Good safety and without use of bedrails.  Short Term Goal 2: ADL transfers and functional mobility tasks to MOD I/IND with Good safety and use of AD as needed.  Short Term Goal 3: UB/LB ADL routine to MOD I/IND with Good safety/pacing and use of AD/AE/compensatory strategies as needed.  Short Term Goal 4: active participation in > 20 minutes of therapeutic

## 2025-06-18 NOTE — PLAN OF CARE
Problem: Discharge Planning  Goal: Discharge to home or other facility with appropriate resources  6/18/2025 0509 by Ashley Lloyd RN  Outcome: Progressing  Flowsheets (Taken 6/18/2025 0509)  Discharge to home or other facility with appropriate resources:   Identify barriers to discharge with patient and caregiver   Arrange for needed discharge resources and transportation as appropriate     Problem: Chronic Conditions and Co-morbidities  Goal: Patient's chronic conditions and co-morbidity symptoms are monitored and maintained or improved  6/18/2025 0509 by Ashley Lloyd RN  Outcome: Progressing  Flowsheets (Taken 6/18/2025 0509)  Care Plan - Patient's Chronic Conditions and Co-Morbidity Symptoms are Monitored and Maintained or Improved:   Monitor and assess patient's chronic conditions and comorbid symptoms for stability, deterioration, or improvement   Collaborate with multidisciplinary team to address chronic and comorbid conditions and prevent exacerbation or deterioration     Problem: Metabolic/Fluid and Electrolytes - Adult  Goal: Electrolytes maintained within normal limits  Outcome: Progressing  Flowsheets (Taken 6/18/2025 0509)  Electrolytes maintained within normal limits:   Monitor labs and assess patient for signs and symptoms of electrolyte imbalances   Administer electrolyte replacement as ordered   Monitor response to electrolyte replacements, including repeat lab results as appropriate     Problem: Metabolic/Fluid and Electrolytes - Adult  Goal: Hemodynamic stability and optimal renal function maintained  Outcome: Progressing  Flowsheets (Taken 6/18/2025 0509)  Hemodynamic stability and optimal renal function maintained: Monitor labs and assess for signs and symptoms of volume excess or deficit     Problem: Metabolic/Fluid and Electrolytes - Adult  Goal: Glucose maintained within prescribed range  Outcome: Progressing  Flowsheets (Taken 6/18/2025 0509)  Glucose maintained within prescribed  range: Monitor blood glucose as ordered

## 2025-06-18 NOTE — CARE COORDINATION
Social Work-Uploaded Hep Panel and chest Xray to [facility]. Left message for [facility] Patel regarding start date. He is tentatively scheduled to begin treatment 6/24/25.

## 2025-06-18 NOTE — PLAN OF CARE
Problem: Chronic Conditions and Co-morbidities  Goal: Patient's chronic conditions and co-morbidity symptoms are monitored and maintained or improved  6/18/2025 1706 by Minna Boone RN  Outcome: Progressing    Care Plan - Patient's Chronic Conditions and Co-Morbidity Symptoms are Monitored and Maintained or Improved:   Monitor and assess patient's chronic conditions and comorbid symptoms for stability, deterioration, or improvement   Collaborate with multidisciplinary team to address chronic and comorbid conditions and prevent exacerbation or deterioration     Problem: Discharge Planning  Goal: Discharge to home or other facility with appropriate resources  6/18/2025 1706 by Minna Boone RN  Outcome: Progressing    Discharge to home or other facility with appropriate resources:   Identify barriers to discharge with patient and caregiver   Arrange for needed discharge resources and transportation as appropriate     Problem: Safety - Adult  Goal: Free from fall injury  6/18/2025 1706 by Minna Boone RN  Outcome: Progressing       Problem: Neurosensory - Adult  Goal: Achieves stable or improved neurological status  6/18/2025 1706 by Minna Boone RN  Outcome: Progressing    Achieves stable or improved neurological status: Assess for and report changes in neurological status  Goal: Achieves maximal functionality and self care  6/18/2025 1706 by Minna Boone RN  Outcome: Progressing       Problem: Respiratory - Adult  Goal: Achieves optimal ventilation and oxygenation  6/18/2025 1706 by Minna Boone RN  Outcome: Progressing    Achieves optimal ventilation and oxygenation:   Assess for changes in respiratory status   Assess for changes in mentation and behavior     Problem: Cardiovascular - Adult  Goal: Maintains optimal cardiac output and hemodynamic stability  6/18/2025 1706 by Minna Boone RN  Outcome: Progressing    Maintains optimal cardiac output and hemodynamic stability:   Monitor  blood pressure and heart rate   Assess for signs of decreased cardiac output  Goal: Absence of cardiac dysrhythmias or at baseline  6/18/2025 1706 by Minna Boone RN  Outcome: Progressing       Problem: Skin/Tissue Integrity - Adult  Goal: Skin integrity remains intact  6/18/2025 1706 by Minna Boone RN  Outcome: Progressing  Flowsheets (Taken 6/18/2025 0755)  Skin Integrity Remains Intact: Monitor for areas of redness and/or skin breakdown    Goal: Incisions, wounds, or drain sites healing without S/S of infection  6/18/2025 1706 by Minna Boone RN  Outcome: Progressing  Flowsheets (Taken 6/18/2025 0755)  Incisions, Wounds, or Drain Sites Healing Without Sign and Symptoms of Infection: TWICE DAILY: Assess and document dressing/incision, wound bed, drain sites and surrounding tissue       Problem: Musculoskeletal - Adult  Goal: Return mobility to safest level of function  6/18/2025 1706 by Minna Boone RN  Outcome: Progressing    Return Mobility to Safest Level of Function:   Assess patient stability and activity tolerance for standing, transferring and ambulating with or without assistive devices   Assist with transfers and ambulation using safe patient handling equipment as needed   Ensure adequate protection for wounds/incisions during mobilization     Problem: Gastrointestinal - Adult  Goal: Maintains adequate nutritional intake  6/18/2025 1706 by Minna Boone RN  Outcome: Progressing       Problem: Genitourinary - Adult  Goal: Absence of urinary retention  6/18/2025 1706 by Minna Boone RN  Outcome: Progressing       Problem: Infection - Adult  Goal: Absence of infection at discharge  6/18/2025 1706 by Minna Boone RN  Outcome: Progressing    Absence of infection at discharge: Assess and monitor for signs and symptoms of infection  Goal: Absence of infection during hospitalization  6/18/2025 1706 by Minna Boone RN  Outcome: Progressing       Problem: Metabolic/Fluid and

## 2025-06-18 NOTE — ANESTHESIA PROCEDURE NOTES
Follow-up with Dr. Gautam Bobby as scheduled in September or sooner if any cardiac symptoms arise.  Please bring all prescription medications in their original bottles to your next appointment. Thank You    Peripheral Block    Patient location during procedure: pre-op  Reason for block: procedure for pain, post-op pain management and at surgeon's request  Start time: 5/8/2024 10:51 AM  End time: 5/8/2024 10:56 AM  Staffing  Performed: anesthesiologist   Anesthesiologist: Danny Milan MD  Performed by: Danny Milan MD  Authorized by: Danny Milan MD    Preanesthetic Checklist  Completed: patient identified, IV checked, site marked, risks and benefits discussed, surgical/procedural consents, equipment checked, pre-op evaluation, timeout performed, anesthesia consent given, oxygen available, monitors applied/VS acknowledged, fire risk safety assessment completed and verbalized and blood product R/B/A discussed and consented  Peripheral Block   Patient position: supine  Prep: ChloraPrep  Provider prep: mask and sterile gloves  Patient monitoring: cardiac monitor, continuous pulse ox, frequent blood pressure checks, IV access, oxygen and responsive to questions  Block type: Erector spinae  Laterality: bilateral  Injection technique: single-shot  Guidance: ultrasound guided    Needle   Needle type: insulated echogenic nerve stimulator needle   Needle gauge: 22 G  Needle localization: ultrasound guidance  Needle length: 11 cm  Assessment   Injection assessment: negative aspiration for heme, no paresthesia on injection and local visualized surrounding nerve on ultrasound  Outcomes: patient tolerated procedure well and uncomplicated    Medications Administered  BUPivacaine (MARCAINE) PF injection 0.5% - Perineural   30 mL - 5/8/2024 10:55:00 AM

## 2025-06-18 NOTE — PROGRESS NOTES
Pt's home CPAP unit brought in and set up at bedside.  Humidifier already filled. Pt will place on self when ready for bed.

## 2025-06-18 NOTE — PROGRESS NOTES
[facility]  Office: [phone]  Aung Luis, DO, Mansoor Cline, DO, Josse Han DO, Gregory Archibald, DO, Andrew Roberts MD, Mayra Medel MD, Ray Johnson MD, Romy Amador MD,  Mic Meraz MD, Rom Hayes MD, Casandra Washington MD,  Ely Burks DO, Dorie Anderson MD, Marcos Travis MD, Trey Luis DO, Delia Clemens MD,  Jorge Major DO, Rose Yi MD, Celia Guillen MD, Matthew Hyatt MD,  Sammy Galvez MD, Mary Kay Karimi MD, Edson Hay MD, Mateo Turner MD, Stan Cline MD, Radha Hoskins MD, Mikael Cm, DO, Virgie Josue MD, Ananya Claire DO, Eran Mckenzie MD, Ely Blackman MD, Mohsin Reza, MD, George Castaneda MD, Shirley Waterhouse, CNP,  Missy Chowdhury, CNP, Mikael Cai, CNP,  Shantelle Monson, KIMI, Betty Boles, CNP, Yany Garcia, CNP, Cathie Law, CNP, Lynda Russell, CNP, An King, PA-C, Gabby Vaughn, CNP, Saray Patton, CNP,  Lacey Wilkinson, CNP, Nidhi Sinha, CNP, Hank Gilbert, PA-C, Jaclyn Neff, CNP,  Patsy Peterson, CNS, Zulay Ramsay, CNP, Martha Prince, CNP,   Mariela Benedict, CNP         [facility]   IN-PATIENT SERVICE   [facility]    Progress Note    6/18/2025    8:31 AM    Name:   Prateek Denton II  MRN:     [id number]     Acct:      [id number]   Room:   2022/2022-01  IP Day:  2  Admit Date:  6/16/2025  7:21 PM    PCP:   Lisseth Coello, APRN - CNP  Code Status:  Full Code    Subjective:     C/C: high k  Interval History Status: not changed.     K still high this am, he is asymptomatic from it    Had dialysis yesterday    Denies cp/sob/n/v/abd pain    Brief History:     Per Nocturnist:  \"65-year-old male past medical history of hypertension, anxiety, right heel infection, a flutter, and CKD presents emergency department for dialysis and hemodialysis tunneled catheter by IR. He was supposed to have a dialysis cath placed today however, due to hyperkalemia patient was recommended come into the  emergency department for placement of HD cath. Patient will also require hemodialysis moving forward. Patient denies nausea, vomiting, diarrhea, shortness breath, cough, headache, numbness, or tingling \"    Review of Systems:     Constitutional:  negative for chills, fevers, sweats  Respiratory:  negative for cough, dyspnea on exertion, shortness of breath, wheezing  Cardiovascular:  negative for chest pain, chest pressure/discomfort, palpitations  Gastrointestinal:  negative for abdominal pain, constipation, diarrhea, nausea, vomiting  Neurological:  negative for dizziness, headache    Medications:     Allergies:    Allergies   Allergen Reactions    Ampicillin Swelling     Swelling of throat.   Pt tolerated cefepime during 4/10/2025 admission    Pcn [Penicillins] Swelling     Throat swelling.  Tolerated cefepime during 8/31/20 admission.    Sulfa Antibiotics      Other reaction(s): Unknown    Tape [Adhesive Tape] Other (See Comments)     CAUSES REDNESS. PAPER TAPE OK.       Current Meds:   Scheduled Meds:    sodium bicarbonate  1,300 mg Oral TID    heparin (porcine)  5,000 Units SubCUTAneous 3 times per day    amLODIPine  10 mg Oral Daily    DULoxetine  30 mg Oral Daily    hydrOXYzine HCl  50 mg Oral Nightly    lactobacillus  1 capsule Oral Daily with breakfast    metoprolol  100 mg Oral BID    traZODone  50 mg Oral Nightly    Vitamin D  1,000 Units Oral Daily    sodium chloride flush  5-40 mL IntraVENous 2 times per day     Continuous Infusions:    dextrose      sodium chloride       PRN Meds: glucose, dextrose bolus **OR** dextrose bolus, glucagon (rDNA), dextrose, heparin (porcine), heparin (porcine), albuterol sulfate HFA, fluticasone, vashe wound therapy, sodium chloride flush, sodium chloride, ondansetron **OR** ondansetron, polyethylene glycol, acetaminophen **OR** acetaminophen    Data:     Past Medical History:   has a past medical history of Acute blood loss anemia, Arthritis, Back pain, Cellulitis, CKD  29.3 29.2 29.0   MCHC 33.9 33.8 33.6   RDW 15.0* 14.7* 14.7*    196 182   MPV 10.0 10.5 10.4   INR  --  1.1  --      Chemistry:  Recent Labs     06/16/25 1944 06/16/25 2055 06/17/25  0451 06/17/25  1610 06/18/25  0540     --  136 136 139   K 5.7*  --  6.0* 5.8* 5.5*     --  108* 108* 107   CO2 19*  --  16* 16* 20   GLUCOSE 122*   < > 87 110 92   BUN 81*  --  80* 75* 55*   CREATININE 5.1*  --  5.2* 5.1* 4.6*   MG 1.6  --   --   --   --    ANIONGAP 13  --  12 13 12   LABGLOM 12*  --  11* 12* 13*   CALCIUM 8.9  --  8.4* 8.5* 8.4*   PHOS 4.2  --  4.3  --   --     < > = values in this interval not displayed.     Recent Labs     06/16/25 2055 06/16/25 2135 06/16/25 2209 06/17/25  1012 06/17/25  1940 06/18/25  0631   POCGLU 114* 100 65* 70* 129* 89     ABG:  Lab Results   Component Value Date/Time    POCPH 7.180 02/29/2024 09:48 AM    PHART 7.363 09/23/2020 03:06 AM    POCPCO2 24.4 02/29/2024 09:48 AM    WEM7TLY 34.3 09/23/2020 03:06 AM    POCPO2 104.5 02/29/2024 09:48 AM    PO2ART 187.0 09/23/2020 03:06 AM    POCHCO3 9.1 02/29/2024 09:48 AM    UFE7LFX 19.0 09/23/2020 03:06 AM    NBEA 17.4 02/29/2024 09:48 AM    PBEA NOT REPORTED 09/23/2020 03:06 AM    GLE4JUN 26 09/03/2020 04:26 AM    FXFL4WOT 96.5 02/29/2024 09:48 AM    X5BSPZWN 99.3 09/23/2020 03:06 AM    FIO2 28.0 02/29/2024 09:48 AM     Lab Results   Component Value Date/Time    SPECIAL Site: Tissue 04/14/2025 08:16 AM         Radiology:  No results found.    Physical Examination:        General appearance:  alert, cooperative and no distress  Mental Status:  oriented to person, place and time and normal affect  Lungs:  clear to auscultation bilaterally, normal effort  Heart:  regular rate and rhythm, no murmur  Abdomen:  soft, nontender, nondistended, normal bowel sounds, no masses, hepatomegaly, splenomegaly; obese  Extremities:  no redness, tenderness in the calves; ble edema   Skin:  no gross lesions, rashes, induration    Assessment:

## 2025-06-19 VITALS
HEIGHT: 71 IN | WEIGHT: 246.25 LBS | DIASTOLIC BLOOD PRESSURE: 92 MMHG | TEMPERATURE: 98.3 F | SYSTOLIC BLOOD PRESSURE: 143 MMHG | RESPIRATION RATE: 18 BRPM | BODY MASS INDEX: 34.48 KG/M2 | OXYGEN SATURATION: 99 % | HEART RATE: 91 BPM

## 2025-06-19 LAB
ANION GAP SERPL CALCULATED.3IONS-SCNC: 14 MMOL/L (ref 9–16)
BASOPHILS # BLD: 0.06 K/UL (ref 0–0.2)
BASOPHILS NFR BLD: 1 % (ref 0–2)
BUN SERPL-MCNC: 40 MG/DL (ref 8–23)
CALCIUM SERPL-MCNC: 8.7 MG/DL (ref 8.8–10.2)
CHLORIDE SERPL-SCNC: 104 MMOL/L (ref 98–107)
CO2 SERPL-SCNC: 22 MMOL/L (ref 20–31)
CREAT SERPL-MCNC: 4.1 MG/DL (ref 0.7–1.2)
EOSINOPHIL # BLD: 0.59 K/UL (ref 0–0.44)
EOSINOPHILS RELATIVE PERCENT: 7 % (ref 1–4)
ERYTHROCYTE [DISTWIDTH] IN BLOOD BY AUTOMATED COUNT: 14.5 % (ref 11.8–14.4)
GFR, ESTIMATED: 15 ML/MIN/1.73M2
GLUCOSE BLD-MCNC: 114 MG/DL (ref 75–110)
GLUCOSE BLD-MCNC: 126 MG/DL (ref 75–110)
GLUCOSE SERPL-MCNC: 98 MG/DL (ref 82–115)
HCT VFR BLD AUTO: 32.6 % (ref 40.7–50.3)
HGB BLD-MCNC: 11 G/DL (ref 13–17)
IMM GRANULOCYTES # BLD AUTO: 0.04 K/UL (ref 0–0.3)
IMM GRANULOCYTES NFR BLD: 1 %
LYMPHOCYTES NFR BLD: 1.93 K/UL (ref 1.1–3.7)
LYMPHOCYTES RELATIVE PERCENT: 23 % (ref 24–43)
MCH RBC QN AUTO: 29.3 PG (ref 25.2–33.5)
MCHC RBC AUTO-ENTMCNC: 33.7 G/DL (ref 28.4–34.8)
MCV RBC AUTO: 86.9 FL (ref 82.6–102.9)
MONOCYTES NFR BLD: 1.18 K/UL (ref 0.1–1.2)
MONOCYTES NFR BLD: 14 % (ref 3–12)
NEUTROPHILS NFR BLD: 54 % (ref 36–65)
NEUTS SEG NFR BLD: 4.79 K/UL (ref 1.5–8.1)
NRBC BLD-RTO: 0 PER 100 WBC
PLATELET # BLD AUTO: 159 K/UL (ref 138–453)
PMV BLD AUTO: 10.4 FL (ref 8.1–13.5)
POTASSIUM SERPL-SCNC: 4.6 MMOL/L (ref 3.7–5.3)
RBC # BLD AUTO: 3.75 M/UL (ref 4.21–5.77)
RBC # BLD: ABNORMAL 10*6/UL
SODIUM SERPL-SCNC: 140 MMOL/L (ref 136–145)
WBC OTHER # BLD: 8.6 K/UL (ref 3.5–11.3)

## 2025-06-19 PROCEDURE — 2500000003 HC RX 250 WO HCPCS: Performed by: STUDENT IN AN ORGANIZED HEALTH CARE EDUCATION/TRAINING PROGRAM

## 2025-06-19 PROCEDURE — 6370000000 HC RX 637 (ALT 250 FOR IP): Performed by: INTERNAL MEDICINE

## 2025-06-19 PROCEDURE — 36415 COLL VENOUS BLD VENIPUNCTURE: CPT

## 2025-06-19 PROCEDURE — 6370000000 HC RX 637 (ALT 250 FOR IP): Performed by: STUDENT IN AN ORGANIZED HEALTH CARE EDUCATION/TRAINING PROGRAM

## 2025-06-19 PROCEDURE — 6360000002 HC RX W HCPCS: Performed by: INTERNAL MEDICINE

## 2025-06-19 PROCEDURE — 99239 HOSP IP/OBS DSCHRG MGMT >30: CPT | Performed by: INTERNAL MEDICINE

## 2025-06-19 PROCEDURE — 80048 BASIC METABOLIC PNL TOTAL CA: CPT

## 2025-06-19 PROCEDURE — 82947 ASSAY GLUCOSE BLOOD QUANT: CPT

## 2025-06-19 PROCEDURE — 85025 COMPLETE CBC W/AUTO DIFF WBC: CPT

## 2025-06-19 PROCEDURE — 90935 HEMODIALYSIS ONE EVALUATION: CPT

## 2025-06-19 PROCEDURE — 6360000002 HC RX W HCPCS: Performed by: EMERGENCY MEDICINE

## 2025-06-19 RX ORDER — SODIUM BICARBONATE 650 MG/1
1300 TABLET ORAL 2 TIMES DAILY
Qty: 120 TABLET | Refills: 1 | Status: SHIPPED | OUTPATIENT
Start: 2025-06-19 | End: 2025-07-19

## 2025-06-19 RX ADMIN — AMLODIPINE BESYLATE 10 MG: 10 TABLET ORAL at 14:31

## 2025-06-19 RX ADMIN — HEPARIN SODIUM 1900 UNITS: 1000 INJECTION INTRAVENOUS; SUBCUTANEOUS at 12:55

## 2025-06-19 RX ADMIN — HEPARIN SODIUM 5000 UNITS: 5000 INJECTION INTRAVENOUS; SUBCUTANEOUS at 06:11

## 2025-06-19 RX ADMIN — METOPROLOL 100 MG: 100 TABLET ORAL at 14:31

## 2025-06-19 RX ADMIN — DULOXETINE 30 MG: 30 CAPSULE, DELAYED RELEASE ORAL at 14:31

## 2025-06-19 RX ADMIN — SODIUM CHLORIDE, PRESERVATIVE FREE 10 ML: 5 INJECTION INTRAVENOUS at 09:00

## 2025-06-19 RX ADMIN — SODIUM BICARBONATE 1300 MG: 650 TABLET ORAL at 14:30

## 2025-06-19 RX ADMIN — Medication 1 CAPSULE: at 14:31

## 2025-06-19 NOTE — PROGRESS NOTES
Pt discharged to home in stable condition with belongings  Discharge instructions given  \"Meds To Beds\" medication at bedside  Pt denies having any further questions at this time  Personal items given to patient at discharge  Patient state they have everything they were admitted with.

## 2025-06-19 NOTE — PLAN OF CARE
Problem: Chronic Conditions and Co-morbidities  Goal: Patient's chronic conditions and co-morbidity symptoms are monitored and maintained or improved  6/18/2025 2120 by Tamiko Santiago RN  Outcome: Progressing  Flowsheets (Taken 6/18/2025 2120)  Care Plan - Patient's Chronic Conditions and Co-Morbidity Symptoms are Monitored and Maintained or Improved:   Monitor and assess patient's chronic conditions and comorbid symptoms for stability, deterioration, or improvement   Collaborate with multidisciplinary team to address chronic and comorbid conditions and prevent exacerbation or deterioration   Update acute care plan with appropriate goals if chronic or comorbid symptoms are exacerbated and prevent overall improvement and discharge     Problem: Safety - Adult  Goal: Free from fall injury  6/18/2025 2120 by Tamiko Santiago RN  Outcome: Progressing  Flowsheets (Taken 6/18/2025 2120)  Free From Fall Injury:   Instruct family/caregiver on patient safety   Based on caregiver fall risk screen, instruct family/caregiver to ask for assistance with transferring infant if caregiver noted to have fall risk factors     Problem: Neurosensory - Adult  Goal: Achieves stable or improved neurological status  6/18/2025 2120 by Tamiko Santiago RN  Outcome: Progressing  Flowsheets (Taken 6/18/2025 2120)  Achieves stable or improved neurological status:   Assess for and report changes in neurological status   Initiate measures to prevent increased intracranial pressure   Maintain blood pressure and fluid volume within ordered parameters to optimize cerebral perfusion and minimize risk of hemorrhage   Monitor temperature, glucose, and sodium. Initiate appropriate interventions as ordered     Problem: Neurosensory - Adult  Goal: Achieves maximal functionality and self care  6/18/2025 2120 by Tamiko Santiago RN  Outcome: Progressing  Flowsheets (Taken 6/18/2025 2120)  Achieves maximal functionality and self care:   Monitor

## 2025-06-19 NOTE — PROGRESS NOTES
Signed         HEMODIALYSIS POST TREATMENT NOTE     Treatment time ordered: 3 hours    Actual treatment time: 3 hours     UltraFiltration Goal: 0  UltraFiltration Removed: 0        Pre Treatment weight: 111.7  Post Treatment weight: 111.7  Estimated Dry Weight: TBD     Access used:     Central Venous Catheter:           Tunneled or Non-tunneled: tunneled           Site: right upper chest           Access Flow: good 350 bfr as prescribed        Internal Access:       AV Fistula or AV Graft: na          Site: na        Access Flow: na        Sign and symptoms of infection: none       If YES: na     Medications or blood products given: none     Chronic outpatient schedule: [facility]     Chronic outpatient unit: [facility]     Summary of response to treatment: Pt tolerated tx with complications.  Pt a&o x4, denies any complaints.  Dressing changed, CD&I.    Explain if orders NOT met, was physician notified:veda BOYCE flowsheet faxed to patient unit/ placed in patient chart: yes     Post assessment completed: yes     Report given to: Minna Espinoza        * Intra-treatment documented Safety Checks include the followin) Access and face visible at all times.     2) All connections and blood lines are secure with no kinks.     3) NVL alarm engaged.     4) Hemosafe device applied (if applicable).     5) No collapse of Arterial or Venous blood chambers.     6) All blood lines / pump segments in the air detectors. 2.5 hours

## 2025-06-19 NOTE — PLAN OF CARE
Problem: Chronic Conditions and Co-morbidities  Goal: Patient's chronic conditions and co-morbidity symptoms are monitored and maintained or improved  Outcome: Completed    Problem: Discharge Planning  Goal: Discharge to home or other facility with appropriate resources  Outcome: Completed       Problem: Safety - Adult  Goal: Free from fall injury  Outcome: Completed     Problem: Neurosensory - Adult  Goal: Achieves stable or improved neurological status  Outcome: Completed  Goal: Achieves maximal functionality and self care  Outcome: Completed     Problem: Cardiovascular - Adult  Goal: Maintains optimal cardiac output and hemodynamic stability  Outcome: Completed    Goal: Absence of cardiac dysrhythmias or at baseline  Outcome: Completed     Problem: Skin/Tissue Integrity - Adult  Goal: Skin integrity remains intact  Outcome: Completed    Goal: Incisions, wounds, or drain sites healing without S/S of infection  Outcome: Completed       Problem: Musculoskeletal - Adult  Goal: Return mobility to safest level of function  Outcome: Completed       Problem: Gastrointestinal - Adult  Goal: Maintains adequate nutritional intake  Outcome: Completed     Problem: Genitourinary - Adult  Goal: Absence of urinary retention  Outcome: Completed     Problem: Infection - Adult  Goal: Absence of infection at discharge  Outcome: Completed  Flowsheets (Taken 6/19/2025 2104)  Absence of infection at discharge: Assess and monitor for signs and symptoms of infection  Goal: Absence of infection during hospitalization  Outcome: Completed     Problem: Metabolic/Fluid and Electrolytes - Adult  Goal: Electrolytes maintained within normal limits  Outcome: Completed    Goal: Hemodynamic stability and optimal renal function maintained  Outcome: Completed       Problem: ABCDS Injury Assessment  Goal: Absence of physical injury  Outcome: Completed

## 2025-06-19 NOTE — PROGRESS NOTES
Pt asking for additional sleep aid. RN messaged [place] NP with patient request. New order reiceved for melatonin.

## 2025-06-19 NOTE — CARE COORDINATION
Social work: Message from [facility], medical clearance pending.     Update 1526- Call from [facility], patient approved for outpatient diaylsis MWF [id number].  Patient provided with schedule letter and agreeable to plan.

## 2025-06-19 NOTE — CARE COORDINATION
DC Planning    Spoke w pt at bedside, getting HD at bedside. Pt hopes to go home today, [facility] can take for HD MWF at 1050-wife to transport. Discussed w primary nurse, per nephrology pt ok to dc today after HD and start OP HD on Monday.     Pt is current w [place]-had wound vac but is now off, informed [place] of OR. [facility] is signed.

## 2025-06-19 NOTE — DISCHARGE SUMMARY
[facility]  Office: [phone]  Aung Luis, DO, Mansoor Cline, DO, Josse Han DO, Gregory Archibald, DO, Andrew Roberts MD, Mayra Medel MD, Ray Johnson MD, Romy Amador MD,  Mic Meraz MD, Rom Hayes MD, Casandra Washington MD,  Ely Burks DO, Dorie Anderson MD, Marcos Travis MD, Trey Luis DO, Delia Clemens MD,  Jorge Major DO, Rose Yi MD, Celia Guillen MD, Matthew Hyatt MD,  Sammy Galvez MD, Mary Kay Karimi MD, Edson Hay MD, Mateo Turner MD, Stan Cline MD, Radha Hoskins MD, Mikael Cm, DO, Virgie Josue MD, Ananya Claire DO, Eran Mckenzie MD, Ely Blackman MD, Mohsin Reza, MD, George Castaneda MD, Shirley Waterhouse, CNP,  Missy Chowdhury CNP, Mikael Cai, CNP,  Shantelle Monson, KIMI, Betty Boles CNP, Yany Garcia, CNP, Cathie Law, CNP, Lynda Russell, CNP, An King, PA-C, Gabby Vaughn, CNP, Saray Patton, CNP,  Lacey Wilkinson, CNP, Nidhi Sinha, CNP, Hank Gilbert, PA-C, Jaclyn Neff, CNP,  Patsy Peterson, CNS, Zulay Ramsay, CNP, Martha Prince, CNP,   Mariela Benedict, CNP         [facility]   IN-PATIENT SERVICE   [facility]    Discharge Summary     Patient ID: Prateek Denton II  :  1960   MRN: [id number]     ACCOUNT:  [id number]   Patient's PCP: Lisseth Coello APRN - CNP  Admit Date: 2025   Discharge Date: 2025     Length of Stay: 3  Code Status:  Full Code  Admitting Physician: Ruben Santoro MD  Discharge Physician: Gregory Archibald DO     Active Discharge Diagnoses:     Hospital Problem Lists:  Principal Problem:    Hyperkalemia  Active Problems:    Essential hypertension    Sleep apnea    Type 2 diabetes mellitus with diabetic nephropathy, with long-term current use of insulin (HCC)    Paroxysmal A-fib (HCC)    Stage 5 chronic kidney disease not on chronic dialysis (HCC)  Resolved Problems:    * No resolved hospital problems. *      Admission Condition:   Date: 6/17/2025  Successful ultrasound and fluoroscopic guided right internal jugular of approach tunneled dialysis catheter placement.  This catheter is ready for use.       Consultations:    Consults:     Final Specialist Recommendations/Findings:   IP CONSULT TO NEPHROLOGY  IP CONSULT TO INTERNAL MEDICINE  IP CONSULT TO SPIRITUAL SERVICES      The patient was seen and examined on day of discharge and this discharge summary is in conjunction with any daily progress note from day of discharge.    Discharge plan:     Disposition: Home    Physician Follow Up:     Lisseth Coello, APRN - CNP  [id number]  [facility] [id number] [phone]    Follow up in 1 week(s)         Requiring Further Evaluation/Follow Up POST HOSPITALIZATION/Incidental Findings: ongoing dialysis    Diet: diabetic diet and renal diet    Activity: As tolerated    Instructions to Patient: take medications as prescribed      Discharge Medications:      Medication List        CHANGE how you take these medications      sodium bicarbonate 650 MG tablet  Take 2 tablets by mouth 2 times daily  What changed: how much to take            CONTINUE taking these medications      albuterol sulfate  (90 Base) MCG/ACT inhaler  Commonly known as: PROVENTIL;VENTOLIN;PROAIR  INHALE 2 PUFFS INTO THE LUNGS 4 TIMES DAILY AS NEEDED FOR WHEEZING OR SHORTNESS OF BREATH.     amLODIPine 10 MG tablet  Commonly known as: NORVASC  Take 1 tablet by mouth daily     CareTouch CPAP & BIPAP Hose Misc     CPAP Machine [facility]     [employer] Blood Glucose Meter w/Device Kit  1 each by Does not apply route in the morning and at bedtime     DULoxetine 30 MG extended release capsule  Commonly known as: CYMBALTA  Take 1 capsule by mouth daily     fluticasone 50 MCG/ACT nasal spray  Commonly known as: FLONASE     FreeStyle Onur 3 Plus Sensor Misc  Wear continuous for glucose monitoring     hydrOXYzine HCl 50 MG tablet  Commonly known as: ATARAX  Take 1 tablet by mouth nightly

## 2025-06-19 NOTE — PROGRESS NOTES
[facility]  Office: [phone]  Aung Luis, DO, Mansoor Cline, DO, Josse Han DO, Gregory Archibald, DO, Andrew Roberts MD, Mayra Medel MD, Ray Johnson MD, Romy Amador MD,  Mic Meraz MD, Rom Hayes MD, Casandra Washington MD,  Ely Burks DO, Dorie Anderson MD, Marcos Travis MD, Trey Luis DO, Delia Clemens MD,  Jorge Major DO, Rose Yi MD, Celia Guillen MD, Matthew Hyatt MD,  Sammy Galvez MD, Mary Kay Karimi MD, Edson Hay MD, Mateo Turner MD, Stan Cline MD, Radha Hoskins MD, Mikael Cm, DO, Virgie Josue MD, Ananya Claire DO, Eran Mckenzie MD, Ely Blackman MD, Mohsin Reza, MD, George Castaneda MD, Shirley Waterhouse, CNP,  Missy Chowdhury, CNP, Mikael Cai, CNP,  Shantelle Monson, KIMI, Betty Boles, CNP, Yany Garcia, CNP, Cathie Law, CNP, Lynda Russell, CNP, An King, PA-C, Gabby Vaughn, CNP, Saray Patton, CNP,  Lacey Wilkinson, CNP, Nidhi Sinha, CNP, Hank Gilbert, PA-C, Jaclyn Neff, CNP,  Patsy Peterson, CNS, Zulay Ramsay, CNP, Martha Prince, CNP,   Mariela Benedict, CNP         [facility]   IN-PATIENT SERVICE   [facility]    Progress Note    6/19/2025    9:04 AM    Name:   Prateek Denton II  MRN:     [id number]     Acct:      [id number]   Room:   2022/2022-01  IP Day:  3  Admit Date:  6/16/2025  7:21 PM    PCP:   Lisseth Coello, APRN - CNP  Code Status:  Full Code    Subjective:     C/C: high k  Interval History Status: not changed.     K no longer high    Had dialysis yesterday    Denies cp/sob/n/v/abd pain    Brief History:     Per Nocturnist:  \"65-year-old male past medical history of hypertension, anxiety, right heel infection, a flutter, and CKD presents emergency department for dialysis and hemodialysis tunneled catheter by IR. He was supposed to have a dialysis cath placed today however, due to hyperkalemia patient was recommended come into the emergency department for placement of HD

## 2025-06-20 ENCOUNTER — TELEPHONE (OUTPATIENT)
Dept: FAMILY MEDICINE CLINIC | Age: 65
End: 2025-06-20

## 2025-06-20 NOTE — TELEPHONE ENCOUNTER
Care Transitions Initial Follow Up Call    Outreach made within 2 business days of discharge: Yes    Patient: Prateek Denton II Patient : 1960   MRN: [id number]  Reason for Admission: Hyperkalemia  Discharge Date: 25       Spoke with: No answer- left a VM to call the office back     Discharge department/facility: [facility] Interactive Patient Contact:  Was patient able to fill all prescriptions:   Was patient instructed to bring all medications to the follow-up visit:   Is patient taking all medications as directed in the discharge summary?   Does patient understand their discharge instructions:   Does patient have questions or concerns that need addressed prior to 7-14 day follow up office visit:     Additional needs identified to be addressed with provider               Scheduled appointment with PCP within 7-14 days    Follow Up  No future appointments.    Mirian Olivo MA

## 2025-06-24 NOTE — H&P
General Surgery  H&P        PATIENT NAME: Prateek Denton II   YOB: 1960    ADMISSION DATE: 5/8/2024     Admitting physician: Dr Doty     TODAY'S DATE: 5/8/2024    Reason for admission: Giant incisional hernia    Chief complaint: Same    HISTORY OF PRESENT ILLNESS:  The patient is an obese 64 y.o. male with history of colectomy and colostomy with reversal of colostomy with 16 x 12 cm incisional hernia.  Patient is now admitted for incisional herniorrhaphy with mesh.  Patient with history of chronic kidney disease and is a former smoker.    Past Medical History:        Diagnosis Date    Acute blood loss anemia 01/05/2022    Arthritis     BILATERAL KNEESand right shoulder    Back pain     Cellulitis 07/14/2015    CKD (chronic kidney disease) stage 3, GFR 30-59 ml/min ([facility])     Dr. [facility] last visit 4/2024    Colostomy RUQ 09/23/2020    Frequent headaches     10/28/2020 PATIENT STATES EVERY OTHER DAY.    Gastrointestinal hemorrhage with melena 01/05/2022    Gout     Hemorrhagic shock ([facility]) 06/21/2023    Hernia of abdominal wall     Dr. Doty    History of atrial fibrillation     AFTER SURGERY ON 09/23/2020 WENT INTO A FIB. CONVERTED BACK TO NORMAL RHYTHM ON HIS OWN. CARDIOLOGIST DR STEIN    History of blood transfusion     NO REACTION    [place] (hard of hearing)     HTN     Hyperkalemia     Hyperlipidemia     Incisional hernia     Kidney infection     Large bowel obstruction (HCC) 08/31/2020    Morbid obesity due to excess calories ([facility]) 10/04/2017    Paroxysmal A-fib ([facility]) 03/12/2024    Dr. [facility] last visit 4/2024    SBO (small bowel obstruction) ([facility]) 04/17/2023    Sepsis ([facility])     post surgery. leak in bowel    Single kidney     Sleep apnea     USES CPAP MACHINE    T2DM 04/29/2024    currently no meds    Tooth missing     RIGHT UPPER 2ND MOLAR    Under care of team 08/2021    Dr. [facility] [facility]    Upper GI bleed 06/21/2023    [facility] next  Patient: Jodi Johnson    Procedure Information       Date/Time: 06/25/25 0900    Scheduled providers: Miller Mcrae DO; Karthik Goodman MD    Procedure: ERCP    Location: [facility]            Relevant Problems   Anesthesia (within normal limits)      Cardiac   (+) Hyperlipidemia   (+) Primary hypertension   (+) Rib pain on right side      Pulmonary  Marijuana smoker  1 ppd smoker tobacco   (+) COPD, moderate (Multi)      Neuro   (+) Anxiety   (+) Depression   (+) Major depressive disorder, recurrent severe without psychotic features (Multi)   (+) Moderate episode of recurrent major depressive disorder   (+) Peripheral neuropathy      GI  Acute on chronic epigastric pain  Cachexia   (+) Gastroesophageal reflux disease   (+) Gastroesophageal reflux disease without esophagitis      /Renal   (+) Chronic renal insufficiency (Cr 1.3)      Liver  Hypoalbuminemia 3.3  EtOH abuse  Pancreatic insufficiency  Dilated CBD   (+) Alcohol-induced chronic pancreatitis (Multi)   (+) Chronic pancreatitis (Multi)   (+) Liver abscess ([facility]-HCC)   (+) Liver nodule      Hematology   (+) Anemia      Musculoskeletal   (+) Primary osteoarthritis involving multiple joints      ID   (+) 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 female who presents for the above mentioned procedure due to Alcohol-induced chronic pancreatitis (Multi) [K86.0];Pancreatitis due to obstruction of pancreatic duct, without necrosis or infection [K85.90, K86.89]    Medical History[1]  Surgical History[2]  Social History[3]  RX Allergies[4]  Current Medications[5]  Prior to Admission medications    Medication Sig Start Date End Date Taking? Authorizing Provider   calcium carbonate-vitamin D3 600 mg-20 mcg (800 unit) tablet Take 1 tablet by mouth once daily.    Historical Provider, MD   calcium citrate-vitamin D3  (Citracal+D) 315 mg-5 mcg (200 unit) tablet   10/19/20   Historical Provider, MD   colestipol (Colestid) 1 gram tablet Take 1 tablet (1 g) by mouth 2 times a day. 4/3/24   Miller Mcrea DO   cyclobenzaprine (Flexeril) 10 mg tablet Take 1 tablet (10 mg) by mouth 2 times a day as needed for muscle spasms for up to 10 days. 10/4/23 10/14/23  Iris Garcia MD   diclofenac sodium 1 % kit 2 grams Transdermal every 4 hours for 30 day(s) 11/22/22   Historical Provider, MD   docusate sodium (Colace) 100 mg capsule Take 1 capsule (100 mg) by mouth once daily. 6/18/25 7/18/25  Albert Corea MD   hydrOXYzine HCL (Atarax) 25 mg tablet Take 1 tablet (25 mg) by mouth every 8 hours if needed for itching. 5/23/25   Cesar Rader MD   lipase-protease-amylase (Creon) 24,000-76,000 -120,000 unit capsule TAKE 1 CAPSULE BY MOUTH THREE TIMES DAILY WITH MEALS 12/14/24   Carlotta Noel MD   magnesium oxide (Mag-Ox) 400 mg (241.3 mg magnesium) tablet TAKE ONE TABLET BY MOUTH TWICE DAILY 10/18/24   Carlotta Noel MD   melatonin 3 mg capsule Take 1 capsule by mouth once daily at bedtime.    Historical Provider, MD   mirtazapine (Remeron) 15 mg tablet Take 1 tablet (15 mg) by mouth once daily at bedtime.    Historical Provider, MD   multivit-minerals/folic acid (ADULT ONE DAILY MULTIVITAMIN ORAL) 1 tablet Orally Once a day    Historical Provider, MD   ondansetron ODT (Zofran-ODT) 4 mg disintegrating tablet Take 1 tablet (4 mg) by mouth every 8 hours if needed for nausea. 3/25/24   Carlotta Noel MD   oxyCODONE-acetaminophen (Percocet) 5-325 mg tablet Take 1 tablet by mouth every 6 hours if needed for severe pain (7 - 10) for up to 7 days. 6/18/25 6/25/25  Albert Corea MD   pantoprazole (ProtoNix) 40 mg EC tablet TAKE ONE TABLET BY MOUTH ONCE DAILY 5/2/24   Carlotta Noel MD   polyethylene glycol (Glycolax, Miralax) 17 gram/dose powder Mix 17 g of powder and drink once daily. 6/18/25   Albert Corea MD   QUEtiapine  (SEROquel) 25 mg tablet Take 0.5 tablets (12.5 mg) by mouth once daily at bedtime.    Historical Provider, MD   sucralfate (Carafate) 1 gram tablet Take 1 tablet (1 g) by mouth 2 times a day. Lunch and bedtime    Historical Provider, MD   traZODone (Desyrel) 100 mg tablet Take 1 tablet (100 mg) by mouth once daily at bedtime.    Historical Provider, MD   lisinopril 20 mg tablet Take 1 tablet (20 mg) by mouth once daily.  6/18/25  Historical Provider, MD   naproxen (Naprosyn) 500 mg tablet Take 1 tablet (500 mg) by mouth 2 times a day as needed for mild pain (1 - 3). 12/12/23 6/18/25  Carlotta Noel MD   pancrelipase, Lip-Prot-Amyl, (Creon) 12,000-38,000 -60,000 unit capsule    Patient not taking: Reported on 6/16/2025 9/12/20 6/18/25  Historical Provider, MD   potassium chloride CR 20 mEq ER tablet TAKE ONE TABLET BY MOUTH ONCE A DAY 10/18/24 6/18/25  Carlotta Noel MD   traMADol (Ultram) 50 mg tablet Take 1 tablet (50 mg) by mouth every 12 hours if needed for moderate pain (4 - 6). 5/23/25 6/18/25  Cesar Rader MD     No medication comments found.  Visit Vitals  OB Status Postmenopausal   Smoking Status Every Day                             6/18/2025     9:23 AM 6/18/2025     8:36 AM 6/18/2025     4:47 AM 6/18/2025    12:55 AM 6/18/2025    12:01 AM 6/17/2025     9:29 PM 6/17/2025     4:26 PM   BP REVIEW   BP (ultimate) 147/61 174/70 151/69 163/67 143/73 141/66 143/68     Recent Labs     06/18/25  0701 06/17/25  0526   WBC 6.7 8.2   HGB 9.8* 9.8*   HCT 30.6* 28.8*    169   MCV 98 94     Recent Labs     06/18/25  0701 06/17/25  0526 06/16/25  0709 05/21/25  0609 05/20/25  0759   LACTATE  --   --   --   --  1.2   EGFR 42* 38* 39*   < > 38*   ANIONGAP 12 14 10   < > 13   BUN 27* 25* 19   < > 15   CREATININE 1.38* 1.50* 1.48*   < > 1.51*   * 135* 135*   < > 132*   K 4.4 4.7 4.7   < > 5.1    101 103   < > 101   CO2 26 25 27   < > 23   GLUCOSE 110* 100* 91   < > 150*   CALCIUM 8.8 9.0 8.4*   < >  9.5   PHOS  --  3.7 3.4   < >  --     < > = values in this interval not displayed.     Recent Labs     06/13/25  1629 07/31/23  1550 09/20/21  1037   TSH  --  1.03 0.94   TROPHS 5  --   --      Recent Labs     06/18/25  0701 06/17/25  0526 06/15/25  0529 06/13/25  1629 05/21/25  0609 05/20/25  0759 04/29/23  0709 04/28/23  0556 06/03/22  0521 06/01/22  1544   BILITOT 0.3 0.3   < > 0.3   < > 0.4   < > 1.3*   < > 3.9*   PROT 5.6* 6.2*   < > 7.5   < > 7.3   < > 5.7*   < > 6.7   ALBUMIN 3.3* 3.5   < > 4.1   < > 4.2   < > 4.1   < > 3.5   ALKPHOS 139* 166*   < > 114   < > 95   < > 49   < > 322*   ALT 18 22   < > 9   < > 7   < > 46*   < > 34   AST 23 28   < > 16   < > 12   < > 93*   < > 32   LIPASE  --   --   --  15  --  31  --   --    < > 7*   AMYLASE  --   --   --   --   --   --   --  61  --  16*    < > = values in this interval not displayed.     Recent Labs     04/26/23  0922 04/12/23  1158   PROTIME 11.5 13.4   INR 1.0 1.2*     Lab Results   Component Value Date    FERRITIN 1,197 (H) 03/19/2019    TIBC 127 (L) 03/19/2019    IRONSAT 66.1 (H) 03/19/2019     Lab Results   Component Value Date    STAPHMRSASCR NO Staphylococcus aureus ISOLATED. 04/12/2023     Recent Labs     11/07/22  1537   AMPHETAMINE PRESUMPTIVE NEGATIVE     Recent Labs     04/28/23  0056 04/27/23  2248   PHART 7.29* 7.26*   DYR9LGG 53* 53*   PO2ART 127* 77*   NER5SAS 25.5 23.8   BEART -1.4 -3.6*         Lab Results   Component Value Date    ABO A 04/26/2023     EKG   Encounter Date: 06/13/25   ECG 12 lead   Result Value    Ventricular Rate 57    Atrial Rate 57    TX Interval 124    QRS Duration 86    QT Interval 372    QTC Calculation(Bazett) 362    P Axis 81    R Axis 81    T Axis 82    QRS Count 9    Q Onset 225    P Onset 163    P Offset 209    T Offset 411    QTC Fredericia 365    Narrative    Sinus bradycardia  Nonspecific ST abnormality  Abnormal ECG  When compared with ECG of 20-MAY-2025 08:08,  Vent. rate has decreased BY  31 BPM  QT has  "shortened  See ED provider note for full interpretation and clinical correlation  Confirmed by Ani Zavaleta ([id number]) on 6/23/2025 12:23:00 PM     Ejection Fractions:No results found for: \"EF\"  Echocardiogram  ECHOCARDIOGRAM     Narrative  Ordered by an unspecified provider.    Stress TestingNo results found for this or any previous visit from the past 97697 days.    Cardiac CatheterizationNo results found for this or any previous visit from the past 39017 days.    Cardiac Scoring No results found for this or any previous visit from the past 57224 days.    AAA screenNo results found for this or any previous visit from the past 23698 days.    Carotid DopplerNo results found for this or any previous visit from the past 23581 days.    X Ray === 10/03/23 ===    XR CERVICAL SPINE 2-3 VIEWS    - Impression -  1. No acute fracture.  2. Grade 1 anterolisthesis of C2 on C3.  3. Degenerative disc disease and spondylosis.    MACRO:  None.    Signed by: Matt Thompson 10/4/2023 8:28 AM  Dictation workstation:   [id number]  Pulmonary Function Tests  No results found for: \"FEV1\", \"FVC\", \"WPF7LNQ\", \"TLC\", \"DLCO\"   OTHER: No results found for this or any previous visit from the past 1825 days.    Results from last 7 days   Lab Units 06/17/25  1623   POCT GLUCOSE mg/dL 98     No results found for: \"PREGTESTUR\", \"PREGSERUM\", \"HCG\", \"HCGQUANT\"  The 10-year ASCVD risk score (Darrell GÓMEZ, et al., 2019) is: 19.1%    Values used to calculate the score:      Age: 65 years      Sex: Female      Is Non- : No      Diabetic: No      Tobacco smoker: Yes      Systolic Blood Pressure: 147 mmHg      Is BP treated: Yes      HDL Cholesterol: 49 MG/DL      Total Cholesterol: 159 MG/DL    Code Status: Full Code                   Clinical information reviewed:                   NPO Detail:  No data recorded     Physical Exam    Airway  Mallampati: I     Cardiovascular    Dental   Comments: edentulous     Pulmonary  " 05/07/24 1053    Physical Exam  Vitals and nursing note reviewed.   Constitutional:       Appearance: Normal appearance. He is obese.   HENT:      Head: Normocephalic and atraumatic.      Right Ear: Tympanic membrane, ear canal and external ear normal.      Left Ear: Tympanic membrane, ear canal and external ear normal.      Nose: Nose normal.      Mouth/Throat:      Mouth: Mucous membranes are moist.      Pharynx: Oropharynx is clear. No oropharyngeal exudate.   Eyes:      General: No scleral icterus.     Extraocular Movements: Extraocular movements intact.      Conjunctiva/sclera: Conjunctivae normal.      Pupils: Pupils are equal, round, and reactive to light.   Cardiovascular:      Rate and Rhythm: Normal rate and regular rhythm.      Pulses: Normal pulses.      Heart sounds: Normal heart sounds.   Pulmonary:      Effort: Pulmonary effort is normal.      Breath sounds: Normal breath sounds.   Abdominal:      General: Abdomen is flat.      Palpations: Abdomen is soft.      Tenderness: There is abdominal tenderness (Around hernia). There is no rebound.      Hernia: A hernia (16 x 12 cm central abdominal incisional hernia) is present.   Genitourinary:     Penis: Normal.       Testes: Normal.      Rectum: Normal.   Musculoskeletal:         General: No deformity or signs of injury. Normal range of motion.      Cervical back: Normal range of motion and neck supple. No rigidity.   Skin:     General: Skin is warm and dry.      Capillary Refill: Capillary refill takes less than 2 seconds.      Coloration: Skin is not jaundiced.   Neurological:      General: No focal deficit present.      Mental Status: He is alert and oriented to person, place, and time.      Cranial Nerves: No cranial nerve deficit.   Psychiatric:         Mood and Affect: Mood normal.         Behavior: Behavior normal.         Judgment: Judgment normal.           CBC:   Lab Results   Component Value Date/Time    WBC 10.8 04/29/2024 03:40 PM    RBC 3.48    Abdominal            Anesthesia Plan    History of general anesthesia?: yes  History of complications of general anesthesia?: no    ASA 4     general     intravenous induction   Anesthetic plan and risks discussed with patient.           [1]   Past Medical History:  Diagnosis Date    Age-related nuclear cataract of both eyes     Irregular astigmatism, bilateral     Unspecified disorder of refraction    [2]   Past Surgical History:  Procedure Laterality Date    BREAST LUMPECTOMY  05/24/2013    Breast Surgery Lumpectomy    COLONOSCOPY  2017    normal. Due in 2027    CT GUIDED PERCUTANEOUS BIOPSY MEDIASTINUM  06/24/2022    CT GUIDED PERCUTANEOUS BIOPSY MEDIASTINUM 6/24/2022 [facility]    US GUIDED ABSCESS FLUID COLLECTION DRAINAGE  06/06/2022    US GUIDED ABSCESS FLUID COLLECTION DRAINAGE 6/6/2022 [facility] CLINICAL LEGAC[facility]    US GUIDED ABSCESS FLUID COLLECTION DRAINAGE  06/06/2022    US GUIDED ABSCESS FLUID COLLECTION DRAINAGE 6/6/2022 [facility] CLINICAL LEGAC[facility]   [3]   Social History  Tobacco Use    Smoking status: Every Day     Current packs/day: 2.00     Average packs/day: 2.0 packs/day for 48.5 years (97.0 ttl pk-yrs)     Types: Cigarettes     Start date: 1977     Passive exposure: Never    Smokeless tobacco: Never   Vaping Use    Vaping status: Never Used   Substance Use Topics    Alcohol use: Not Currently    Drug use: Yes     Types: Marijuana   [4]   Allergies  Allergen Reactions    Penicillins Unknown     Tolerated cefepime   [5]   Current Outpatient Medications:     calcium carbonate-vitamin D3 600 mg-20 mcg (800 unit) tablet, Take 1 tablet by mouth once daily., Disp: , Rfl:     calcium citrate-vitamin D3 (Citracal+D) 315 mg-5 mcg (200 unit) tablet,  , Disp: , Rfl:     colestipol (Colestid) 1 gram tablet, Take 1 tablet (1 g) by mouth 2 times a day., Disp: 180 tablet, Rfl: 3    cyclobenzaprine (Flexeril) 10 mg tablet, Take 1 tablet (10 mg) by mouth 2 times a day as needed for muscle spasms for up to 10 days.,  Disp: 10 tablet, Rfl: 0    diclofenac sodium 1 % kit, 2 grams Transdermal every 4 hours for 30 day(s), Disp: , Rfl:     docusate sodium (Colace) 100 mg capsule, Take 1 capsule (100 mg) by mouth once daily., Disp: 30 capsule, Rfl: 0    hydrOXYzine HCL (Atarax) 25 mg tablet, Take 1 tablet (25 mg) by mouth every 8 hours if needed for itching., Disp: , Rfl:     lipase-protease-amylase (Creon) 24,000-76,000 -120,000 unit capsule, TAKE 1 CAPSULE BY MOUTH THREE TIMES DAILY WITH MEALS, Disp: 270 capsule, Rfl: 0    magnesium oxide (Mag-Ox) 400 mg (241.3 mg magnesium) tablet, TAKE ONE TABLET BY MOUTH TWICE DAILY, Disp: 180 tablet, Rfl: 0    melatonin 3 mg capsule, Take 1 capsule by mouth once daily at bedtime., Disp: , Rfl:     mirtazapine (Remeron) 15 mg tablet, Take 1 tablet (15 mg) by mouth once daily at bedtime., Disp: , Rfl:     multivit-minerals/folic acid (ADULT ONE DAILY MULTIVITAMIN ORAL), 1 tablet Orally Once a day, Disp: , Rfl:     ondansetron ODT (Zofran-ODT) 4 mg disintegrating tablet, Take 1 tablet (4 mg) by mouth every 8 hours if needed for nausea., Disp: 90 tablet, Rfl: 0    oxyCODONE-acetaminophen (Percocet) 5-325 mg tablet, Take 1 tablet by mouth every 6 hours if needed for severe pain (7 - 10) for up to 7 days., Disp: 15 tablet, Rfl: 0    pantoprazole (ProtoNix) 40 mg EC tablet, TAKE ONE TABLET BY MOUTH ONCE DAILY, Disp: 30 tablet, Rfl: 0    polyethylene glycol (Glycolax, Miralax) 17 gram/dose powder, Mix 17 g of powder and drink once daily., Disp: 238 g, Rfl: 0    QUEtiapine (SEROquel) 25 mg tablet, Take 0.5 tablets (12.5 mg) by mouth once daily at bedtime., Disp: , Rfl:     sucralfate (Carafate) 1 gram tablet, Take 1 tablet (1 g) by mouth 2 times a day. Lunch and bedtime, Disp: , Rfl:     traZODone (Desyrel) 100 mg tablet, Take 1 tablet (100 mg) by mouth once daily at bedtime., Disp: , Rfl:

## 2025-07-08 NOTE — PROGRESS NOTES
Physician Progress Note      PATIENT:               LAW REBOLLEDO  CSN #:                  [id number]  :                       1960  ADMIT DATE:       2025 7:21 PM  DISCH DATE:        2025 3:30 PM  RESPONDING  PROVIDER #:        Jerome BARNES MD          QUERY TEXT:    hyponatremia is documented in the medical record Progress Notes by Jerome Barnes MD at 2025. Please provide additional clinical indicators   supportive of your documentation. Or please document if the diagnosis of   hyponatremia has been ruled out after study.    The clinical indicators include:  65 years old male ESRD, HTN,    - \"Mild hyponatremia, improved.\"-Progress Notes by Jerome Barnes MD at   2025 sodium 137    2025 sodium 136    2025 sodium 139    2025 sodium 140    -0.9 % sodium chloride infusion, serial lab    Thank you    Raymond Crow [facility]  ,  CDS  Options provided:  -- hyponatremia present as evidenced by, Please document evidence.  -- hyponatremia was ruled out after study  -- Other - I will add my own diagnosis  -- Disagree - Not applicable / Not valid  -- Disagree - Clinically unable to determine / Unknown  -- Refer to Clinical Documentation Reviewer    PROVIDER RESPONSE TEXT:    hyponatremia was ruled out after study.    Query created by: Raymond Crow on 2025 9:32 AM      Electronically signed by:  Jerome BARNES MD 2025 9:12 AM

## 2025-08-03 RX ORDER — ACYCLOVIR 800 MG/1
TABLET ORAL
Qty: 2 EACH | Refills: 5 | Status: SHIPPED | OUTPATIENT
Start: 2025-08-03

## 2025-08-12 RX ORDER — ACYCLOVIR 800 MG/1
TABLET ORAL
Qty: 2 EACH | Refills: 5 | Status: SHIPPED | OUTPATIENT
Start: 2025-08-12

## (undated) DEVICE — PACK SURG ABD SVMMC

## (undated) DEVICE — SUTURE PERMAHAND SZ 3-0 L18IN NONABSORBABLE BLK SILK BRAID A184H

## (undated) DEVICE — GOWN POLY REINF SONT XLG: Brand: MEDLINE INDUSTRIES, INC.

## (undated) DEVICE — GAUZE,SPONGE,FLUFF,6"X6.75",STRL,5/TRAY: Brand: MEDLINE

## (undated) DEVICE — BASIN EMSIS 700ML GRAPHITE PLAS KID SHP GRAD

## (undated) DEVICE — GAUZE,PACKING STRIP,IODOFORM,1/2"X5YD,ST: Brand: CURAD

## (undated) DEVICE — BLADE CLIPPER GEN PURP NS

## (undated) DEVICE — STAZ LOWER EXTREMITY: Brand: MEDLINE INDUSTRIES, INC.

## (undated) DEVICE — GAUZE,SPONGE,4"X4",16PLY,XRAY,STRL,LF: Brand: MEDLINE

## (undated) DEVICE — SUTURE VCRL SZ 2-0 L27IN ABSRB UD L26MM SH 1/2 CIR J417H

## (undated) DEVICE — BANDAGE COMPR W4INXL5YD WHT BGE POLY COT M E WRP WV HK AND

## (undated) DEVICE — RELOAD STPL H1.5X3.6XL60MM REG TISS BLU B FRM AUTO RET PIN

## (undated) DEVICE — TOWEL,OR,DSP,ST,BLUE,DLX,XR,4/PK,20PK/CS: Brand: MEDLINE

## (undated) DEVICE — PACK PROCEDURE SURG ROBOTIC KID SVMMC

## (undated) DEVICE — SUTURE MONOCRYL + ABSORBABLE MONOFILAMENT 3-0 SH 27 IN UD  MCP316H

## (undated) DEVICE — RETRIEVER SPEC L230CM DIA2.5MM POLYPR FOR TISS RETRV ROTH

## (undated) DEVICE — CONTAINER,SPECIMEN,OR STERILE,4OZ: Brand: MEDLINE

## (undated) DEVICE — GOWN,AURORA,NONREINFORCED,LARGE: Brand: MEDLINE

## (undated) DEVICE — NITINOL STONE RETRIEVAL BASKET: Brand: ZERO TIP

## (undated) DEVICE — DRAPE IRRIG FLD WRM W44XL66IN W/ AORN STD PRTBL INTRATEMP

## (undated) DEVICE — PAD,ABDOMINAL,5"X9",ST,LF,25/BX: Brand: MEDLINE INDUSTRIES, INC.

## (undated) DEVICE — ELECTRODE PT RET AD L9FT HI MOIST COND ADH HYDRGEL CORDED

## (undated) DEVICE — TIP COVER ACCESSORY

## (undated) DEVICE — GLOVE SURG 7.5 11.7IN BEAD CUF LIGHT BRN SENSICARE LTX FREE

## (undated) DEVICE — PROTECTOR ULN NRV PUR FOAM HK LOOP STRP ANATOMICALLY

## (undated) DEVICE — BNDG,ELSTC,MATRIX,STRL,4"X5YD,LF,HOOK&LP: Brand: MEDLINE

## (undated) DEVICE — SOLUTION SODIUM CHL 0.9% 500 ML IRRI BTL

## (undated) DEVICE — MEDICINE CUP, GRADUATED, STER: Brand: MEDLINE

## (undated) DEVICE — STAPLER INT L75MM CUT LN L73MM STPL LN L77MM BLU B FRM 8

## (undated) DEVICE — SUTURE PROL SZ 4-0 L30IN NONABSORBABLE BLU SH L26MM 1/2 CIR 8831H

## (undated) DEVICE — GLOVE SURG SZ 65 THK91MIL LTX FREE SYN POLYISOPRENE

## (undated) DEVICE — NEEDLE, QUINCKE, 18GX3.5": Brand: MEDLINE

## (undated) DEVICE — DRAPE EQUIP XR CASSETTE LG 24.5X23.5 IN LF

## (undated) DEVICE — TOTAL TRAY, 16FR 10ML SIL FOLEY, URN: Brand: MEDLINE

## (undated) DEVICE — SUTURE PROL SZ 1 L30IN NONABSORBABLE BLU CTX L48MM 1/2 CIR 8455H

## (undated) DEVICE — GUIDEWIRE URO L150CM DIA0.035IN STIFF NIT HYDRPHLC STR TIP

## (undated) DEVICE — COVER OR TBL W40XL90IN ABSRB STD AND GRIPPY BK SAHARA

## (undated) DEVICE — STRIP SKIN CLSR W0.25XL4IN WHT SPUNBOUND FBR NYL HI ADH

## (undated) DEVICE — SOLUTION SCRB 4OZ 4% CHG H2O AIDED FOR PREOPERATIVE SKIN

## (undated) DEVICE — GLOVE ORANGE PI 7 1/2   MSG9075

## (undated) DEVICE — SUTURE VCRL SZ 1 L18IN ABSRB VLT CT-1 L36MM 1/2 CIR J741D

## (undated) DEVICE — STAPLER SKIN L440MM 32MM LNG 12 FIRING B FRM PWR + GRIPPING

## (undated) DEVICE — CO2 CANNULA,SUPERSOFT, ADLT,7'O2,7'CO2: Brand: MEDLINE

## (undated) DEVICE — GOWN,SIRUS,NONRNF,SETINSLV,XL,20/CS: Brand: MEDLINE

## (undated) DEVICE — GAUZE,SPONGE,4"X4",16PLY,STRL,LF,10/TRAY: Brand: MEDLINE

## (undated) DEVICE — SUTURE PROL SZ 0 L30IN NONABSORBABLE BLU L36MM CT-1 1/2 CIR 8424H

## (undated) DEVICE — GLOVE ORTHO 7 1/2   MSG9475

## (undated) DEVICE — YANKAUER,FLEXIBLE HANDLE,REGLR CAPACITY: Brand: MEDLINE INDUSTRIES, INC.

## (undated) DEVICE — INSTRUMENT REPROC SEAL/DIVIDE OPEN LIGASURE IMPACT CRV LG JAW NANO-COAT 18CM

## (undated) DEVICE — STAPLER INT L60MM REG TISS BLU B FRM 8 FIRING 2 ROW AUTO

## (undated) DEVICE — JELLY,LUBE,STERILE,FLIP TOP,TUBE,2-OZ: Brand: MEDLINE

## (undated) DEVICE — SUTURE NONABSORBABLE MONOFILAMENT 3-0 PS-1 18 IN BLK ETHILON 1663H

## (undated) DEVICE — CANNULA SEAL

## (undated) DEVICE — SUTURE VCRL SZ 3-0 L18IN ABSRB UD L26MM SH 1/2 CIR J864D

## (undated) DEVICE — ADAPTER TBNG LUER STUB 15 GA INTMED

## (undated) DEVICE — SEALER ENDOSCP NANO COAT OPN DIV CRV L JAW LIGASURE IMPACT

## (undated) DEVICE — SOLUTION PREP PAINT POV IOD FOR SKIN MUCOUS MEM

## (undated) DEVICE — SWAB CULT CLR BLU PLAS RAYON LIQ AMIES AERB ANAERB FRIC CAP

## (undated) DEVICE — FORCEP BX MESH TOOTH MIC 2.8 MMX240 CM NDL STRL RADIAL JAW 4

## (undated) DEVICE — SUTURE MONOCRYL SZ 3-0 L27IN ABSRB UD PS-2 3/8 CIR REV CUT NDL MCP427H

## (undated) DEVICE — SUTURE N ABSRB L 18 IN SZ 4-0 NDL L 19 MM NYL MONOFILAMENT

## (undated) DEVICE — BNDG,ELSTC,MATRIX,STRL,6"X5YD,LF,HOOK&LP: Brand: MEDLINE

## (undated) DEVICE — Z DUP USE 2389341 INSERT CUSH STD PRONEVIEW

## (undated) DEVICE — 3M™ IOBAN™ 2 ANTIMICROBIAL INCISE DRAPE 6650EZ: Brand: IOBAN™ 2

## (undated) DEVICE — BANDAGE COBAN 4 IN COMPR W4INXL5YD FOAM COHESIVE QUIK STK SELF ADH SFT

## (undated) DEVICE — SYRINGE IRRIG 60ML SFT PLIABLE BLB EZ TO GRP 1 HND USE W/

## (undated) DEVICE — GLOVE SURG SZ 8 CRM LTX FREE POLYISOPRENE POLYMER BEAD ANTI

## (undated) DEVICE — DRAIN WND SIL FLAT RADIOPAQUE 10MM FULL FLUTED

## (undated) DEVICE — TROCAR: Brand: KII FIOS FIRST ENTRY

## (undated) DEVICE — DRAPE,REIN 53X77,STERILE: Brand: MEDLINE

## (undated) DEVICE — SUTURE COAT VCRL SZ 4-0 L18IN ABSRB VLT SH-1 L22MM 1/2 CIR J771D

## (undated) DEVICE — SOLUTION IRRIG 500ML 0.9% SOD CHLO USP POUR PLAS BTL

## (undated) DEVICE — SIX SHOOTER SAEED MULTI-BAND LIGATOR: Brand: SAEED

## (undated) DEVICE — RESERVOIR,SUCTION,100CC,SILICONE: Brand: MEDLINE

## (undated) DEVICE — SUTURE PROL SZ 1 L30IN NONABSORBABLE BLU L36MM CT-1 1/2 CIR 8425H

## (undated) DEVICE — GLOVE ORANGE PI 8   MSG9080

## (undated) DEVICE — CATHETER FOL 2 W 16 FR URETH INDWL REG DOVER

## (undated) DEVICE — SOLUTION IV 1000ML 0.9% SOD CHL PH 5 INJ USP VIAFLX PLAS

## (undated) DEVICE — DUAL LUMEN URETERAL CATHETER

## (undated) DEVICE — STRAP ARMBRD W1.5XL32IN FOAM STR YET SFT W/ HK AND LOOP

## (undated) DEVICE — GLOVE ORTHO 8   MSG9480

## (undated) DEVICE — SOLUTION IV 500ML 0.9% SOD BOTTLE CHL LTWT DURABLE SHATTERPROOF

## (undated) DEVICE — FORCEPS BX L240CM WRK CHN 2.8MM STD CAP W/ NDL MIC MESH

## (undated) DEVICE — BITEBLOCK ENDOSCP 60FR MAXI WHT POLYETH STURDY W/ VELC WVN

## (undated) DEVICE — SUTURE PDS II SZ 0 L60IN ABSRB VLT L65MM TP-1 1/2 CIR Z991G

## (undated) DEVICE — DISPOSABLE DISTAL ATTACHMENT: Brand: DISPOSABLE DISTAL ATTACHMENT

## (undated) DEVICE — SINGLE ACTION PUMPING SYSTEM

## (undated) DEVICE — SOLUTION IRRIG 3000ML 0.9% SOD CHL USP UROMATIC PLAS CONT

## (undated) DEVICE — DRAINBAG,ANTI-REFLUX TOWER,L/F,2000ML,LL: Brand: MEDLINE

## (undated) DEVICE — GOWN,AURORA,NONRNF,XL,30/CS: Brand: MEDLINE

## (undated) DEVICE — INTENDED FOR TISSUE SEPARATION, AND OTHER PROCEDURES THAT REQUIRE A SHARP SURGICAL BLADE TO PUNCTURE OR CUT.: Brand: BARD-PARKER ® CARBON RIB-BACK BLADES

## (undated) DEVICE — SOL IRR SOD CHL 0.9% TITAN XL CNTNR 3000ML

## (undated) DEVICE — 4-PORT MANIFOLD: Brand: NEPTUNE 2

## (undated) DEVICE — GARMENT,MEDLINE,DVT,INT,CALF,MED, GEN2: Brand: MEDLINE

## (undated) DEVICE — TRI-LUMEN FILTERED TUBE SET WITH ACTIVATED CHARCOAL FILTER: Brand: AIRSEAL

## (undated) DEVICE — ADAPTER URO SCP UROLOK LL

## (undated) DEVICE — DRAIN SURG PENROSE 0.5X18 IN CLOSED WND DRAINAGE PREM SIL

## (undated) DEVICE — SUTURE MONOCRYL SZ 2-0 L36IN ABSRB UD L36MM CT-1 1/2 CIR Y945H

## (undated) DEVICE — GLOVE SURG SZ 75 CRM LTX FREE POLYISOPRENE POLYMER BEAD ANTI

## (undated) DEVICE — PACK PROCEDURE SURG CYSTO SVMMC LF

## (undated) DEVICE — TOWEL,OR,DSP,ST,NATURAL,DLX,4/PK,20PK/CS: Brand: MEDLINE

## (undated) DEVICE — SUTURE PERMAHAND SZ 2-0 L12X18IN NONABSORBABLE BLK SILK A185H

## (undated) DEVICE — SUTURE PERMAHAND SZ 2-0 L18IN NONABSORBABLE BLK L26MM PS 1588H

## (undated) DEVICE — 3M™ STERI-STRIP™ COMPOUND BENZOIN TINCTURE 40 BAGS/CARTON 4 CARTONS/CASE C1544: Brand: 3M™ STERI-STRIP™

## (undated) DEVICE — RELOAD STPL L75MM OPN H3.8MM CLS 1.5MM WIRE DIA0.2MM REG

## (undated) DEVICE — ARM DRAPE

## (undated) DEVICE — SYRINGE MED 50ML LUERLOCK TIP

## (undated) DEVICE — HANDPIECE SET WITH COAXIAL HIGH FLOW TIP AND SUCTION TUBE: Brand: INTERPULSE

## (undated) DEVICE — SUTURE PERMAHAND SZ 4-0 L18IN NONABSORBABLE BLK L26MM SH C014D

## (undated) DEVICE — SYRINGE 20ML LL S/C 50

## (undated) DEVICE — CONMED DISPOSABLE BRONCHIAL CYTOLOGY BRUSH, STRAIGHT HANDLE, Ø2 MM: Brand: CONMED

## (undated) DEVICE — SUTURE PERMAHAND SZ 3-0 L30IN NONABSORBABLE BLK SH L26MM K832H

## (undated) DEVICE — PENROSE DRAIN 18 X .5" SILICONE: Brand: MEDLINE

## (undated) DEVICE — SKIN PREP TRAY W/CHG: Brand: MEDLINE INDUSTRIES, INC.

## (undated) DEVICE — BLADELESS OBTURATOR: Brand: WECK VISTA

## (undated) DEVICE — PADDING UNDERCAST W4INXL12FT RAYON POLY SYN NONADHESIVE

## (undated) DEVICE — METER URIN 350ML 2500ML INF CTRL BACTSTAT COLL SYS SFTY FLO

## (undated) DEVICE — SUTURE ETHILON SZ 3-0 L18IN NONABSORBABLE BLK PS-2 L19MM 3/8 1669H

## (undated) DEVICE — STRIP,CLOSURE,WOUND,MEDI-STRIP,1/2X4: Brand: MEDLINE

## (undated) DEVICE — GLOVE ORANGE PI 7   MSG9070

## (undated) DEVICE — MITT SURG PREP L ADH DISPOSABLE

## (undated) DEVICE — DRESSING PETRO W3XL8IN OIL EMUL N ADH GZ KNIT IMPREG CELOS

## (undated) DEVICE — GARMENT COMPR STD FOR 17IN CALF UNIF THER FLOTRN

## (undated) DEVICE — APPLICATOR MEDICATED 26 CC SOLUTION HI LT ORNG CHLORAPREP

## (undated) DEVICE — SUTURE MONOCRYL SZ 4-0 L27IN ABSRB UD L19MM PS-2 1/2 CIR PRIM Y426H

## (undated) DEVICE — STRAP,POSITIONING,KNEE/BODY,FOAM,4X60": Brand: MEDLINE

## (undated) DEVICE — PADDING CAST W6INXL4YD COT LO LINTING WYTEX

## (undated) DEVICE — SUTURE VICRYL SZ 0 L27IN ABSRB UD L36MM CT-1 1/2 CIR J260H

## (undated) DEVICE — CATHETER F BLLN 5CC 20FR INF CTRL 2 W SIL ALLY AND HYDRGEL

## (undated) DEVICE — SUTURE MONOCRYL SZ 2-0 L27IN ABSRB VLT SH L26MM 1/2 CIR TAPR Y317H

## (undated) DEVICE — SUTURE MONOCRYL SZ 3-0 L27IN ABSRB UD L26MM SH 1/2 CIR Y416H

## (undated) DEVICE — 1LYRTR 16FR10ML100%SIL UMS SNP: Brand: MEDLINE INDUSTRIES, INC.

## (undated) DEVICE — SOLUTION ANTIFOG VIS SYS CLEARIFY LAPSCP

## (undated) DEVICE — AIRSEAL 12 MM ACCESS PORT AND PALM GRIP OBTURATOR WITH BLADELESS OPTICAL TIP, 120 MM LENGTH: Brand: AIRSEAL

## (undated) DEVICE — ZIMMER® STERILE DISPOSABLE TOURNIQUET CUFF WITH PLC, DUAL PORT, SINGLE BLADDER, 30 IN. (76 CM)

## (undated) DEVICE — ELECTROSURGERY PENCIL ADAPTOR: Brand: PENADAPT

## (undated) DEVICE — STAZ ENDO KIT: Brand: MEDLINE INDUSTRIES, INC.

## (undated) DEVICE — SMALL TEAR CROSS CUT RASP (11.0 X 5.0MM)

## (undated) DEVICE — GOWN,SIRUS,POLYRNF,BRTHSLV,LG,30/CS: Brand: MEDLINE

## (undated) DEVICE — SPLINT QUICK STEP SCOTCHCAST 5 X 30

## (undated) DEVICE — Device

## (undated) DEVICE — DRAPE C ARM W/ POLY STRP W42XL72IN FOR MOB XR

## (undated) DEVICE — POUCH INSTR W6.75XL11.5IN FRST 2 PKT ADH FOR ORTH AND

## (undated) DEVICE — SUTURE ETHILON SZ 3-0 L18IN NONABSORBABLE BLK L24MM PS-1 3/8 1663G

## (undated) DEVICE — ACETONE 4 LITER CONTAINER

## (undated) DEVICE — RADIFOCUS GLIDEWIRE: Brand: GLIDEWIRE

## (undated) DEVICE — SPONGE LAP W18XL18IN WHT COT 4 PLY FLD STRUNG RADPQ DISP ST 2 PER PACK

## (undated) DEVICE — SUTURE PROL SZ 1 L60IN NONABSORBABLE BLU L65MM TP-1 3/8 CIR 8824G

## (undated) DEVICE — TUBE IRRIG HNDPC HI FLO TP INTRPULS W/SUCTION TUBE

## (undated) DEVICE — PADDING,UNDERCAST,COTTON, 4"X4YD STERILE: Brand: MEDLINE

## (undated) DEVICE — GLOVE SURG SZ 6 THK91MIL LTX FREE SYN POLYISOPRENE ANTI

## (undated) DEVICE — GAUZE,PACKING STRIP,PLAIN,1/4"X5YD,STRL: Brand: CURAD